# Patient Record
Sex: MALE | Race: BLACK OR AFRICAN AMERICAN | NOT HISPANIC OR LATINO | Employment: OTHER | URBAN - METROPOLITAN AREA
[De-identification: names, ages, dates, MRNs, and addresses within clinical notes are randomized per-mention and may not be internally consistent; named-entity substitution may affect disease eponyms.]

---

## 2023-03-16 LAB
APPEARANCE, URINE: ABNORMAL
BILIRUBIN, URINE: NEGATIVE
BLOOD, URINE: NEGATIVE
BUDDING YEAST, URINE: PRESENT /HPF
COLOR, URINE: ABNORMAL
GLUCOSE, URINE: ABNORMAL MG/DL
HYALINE CASTS, URINE: ABNORMAL /LPF
KETONES, URINE: NEGATIVE MG/DL
LEUKOCYTE ESTERASE, URINE: NEGATIVE
MUCUS, URINE: ABNORMAL /LPF
NITRITE, URINE: NEGATIVE
PH, URINE: 5 (ref 5–8)
PROTEIN, URINE: ABNORMAL MG/DL
RBC, URINE: 3 /HPF (ref 0–5)
SPECIFIC GRAVITY, URINE: 1.03 (ref 1–1.03)
SQUAMOUS EPITHELIAL CELLS, URINE: 2 /HPF
UROBILINOGEN, URINE: <2 MG/DL (ref 0–1.9)
WBC, URINE: 6 /HPF (ref 0–5)

## 2023-03-17 LAB
CREATININE (MG/DL) IN URINE: 241 MG/DL (ref 20–370)
PROTEIN (MG/DL) IN URINE: 557 MG/DL (ref 5–25)
PROTEIN/CREATININE (MG/MG) IN URINE: 2.31 MG/MG CREAT (ref 0–0.17)

## 2023-03-19 LAB — URINE CULTURE: NORMAL

## 2023-03-27 LAB
GRAM STAIN: ABNORMAL
TISSUE/WOUND CULTURE/SMEAR: ABNORMAL

## 2023-03-28 LAB
AFB CULTURE: NORMAL
AFB STAIN: NORMAL

## 2023-05-09 ENCOUNTER — OFFICE VISIT (OUTPATIENT)
Dept: PRIMARY CARE | Facility: CLINIC | Age: 68
End: 2023-05-09
Payer: COMMERCIAL

## 2023-05-09 VITALS
HEART RATE: 73 BPM | DIASTOLIC BLOOD PRESSURE: 67 MMHG | WEIGHT: 160 LBS | HEIGHT: 68 IN | TEMPERATURE: 97.5 F | OXYGEN SATURATION: 98 % | SYSTOLIC BLOOD PRESSURE: 148 MMHG | BODY MASS INDEX: 24.25 KG/M2

## 2023-05-09 DIAGNOSIS — R10.31 RIGHT LOWER QUADRANT ABDOMINAL PAIN: ICD-10-CM

## 2023-05-09 DIAGNOSIS — H53.8 BLURRY VISION, BILATERAL: ICD-10-CM

## 2023-05-09 DIAGNOSIS — Z09 FOLLOW UP: Primary | ICD-10-CM

## 2023-05-09 PROCEDURE — 99214 OFFICE O/P EST MOD 30 MIN: CPT | Performed by: STUDENT IN AN ORGANIZED HEALTH CARE EDUCATION/TRAINING PROGRAM

## 2023-05-09 PROCEDURE — 1159F MED LIST DOCD IN RCRD: CPT | Performed by: STUDENT IN AN ORGANIZED HEALTH CARE EDUCATION/TRAINING PROGRAM

## 2023-05-09 PROCEDURE — 3008F BODY MASS INDEX DOCD: CPT | Performed by: STUDENT IN AN ORGANIZED HEALTH CARE EDUCATION/TRAINING PROGRAM

## 2023-05-09 PROCEDURE — 1036F TOBACCO NON-USER: CPT | Performed by: STUDENT IN AN ORGANIZED HEALTH CARE EDUCATION/TRAINING PROGRAM

## 2023-05-09 RX ORDER — CLINDAMYCIN HYDROCHLORIDE 300 MG/1
CAPSULE ORAL
COMMUNITY
Start: 2023-03-29 | End: 2023-10-03 | Stop reason: ENTERED-IN-ERROR

## 2023-05-09 RX ORDER — SULFAMETHOXAZOLE AND TRIMETHOPRIM 800; 160 MG/1; MG/1
TABLET ORAL
COMMUNITY
Start: 2023-02-17 | End: 2023-10-03 | Stop reason: ENTERED-IN-ERROR

## 2023-05-09 RX ORDER — INSULIN LISPRO 100 [IU]/ML
INJECTION, SOLUTION INTRAVENOUS; SUBCUTANEOUS
COMMUNITY
End: 2023-10-25 | Stop reason: SDUPTHER

## 2023-05-09 RX ORDER — INSULIN GLARGINE 100 [IU]/ML
INJECTION, SOLUTION SUBCUTANEOUS
COMMUNITY
End: 2023-07-05 | Stop reason: SDUPTHER

## 2023-05-09 RX ORDER — FLUCONAZOLE 200 MG/1
TABLET ORAL
COMMUNITY
Start: 2023-03-20 | End: 2023-10-03 | Stop reason: ENTERED-IN-ERROR

## 2023-05-09 RX ORDER — DOCUSATE SODIUM 100 MG/1
CAPSULE, LIQUID FILLED ORAL
COMMUNITY
Start: 2023-02-17 | End: 2023-10-03 | Stop reason: ENTERED-IN-ERROR

## 2023-05-09 RX ORDER — OXYCODONE HYDROCHLORIDE 5 MG/1
TABLET ORAL
COMMUNITY
Start: 2023-03-29 | End: 2023-10-03 | Stop reason: ENTERED-IN-ERROR

## 2023-05-09 ASSESSMENT — PAIN SCALES - GENERAL: PAINLEVEL: 5

## 2023-05-09 NOTE — PROGRESS NOTES
"Subjective   Patient ID: Manolo Bashir is a 67 y.o. male who presents for Follow-up and Abdominal Pain.  #ED Follow up  #Abdominal Pain  - Evaluated in the ED with unremarkable work up  - Feels the same as when evaluated in the ED   - Most concerning symptoms to him are eyes and head  - Eye feel like they are blurry, planning to make an appointment  - Told to see GI but has not yet    #Abdominal pain  - Localized to the mid-right of the stomach  - States its near where his ostomy bag was  - Endorses diarrhea for the last two week, no blood noticed  - States his most recent colonoscopy was unremarkable  - Denies any nausea, vomiting, hematochezia, or urinary symptoms      Objective     /67 (BP Location: Right arm, Patient Position: Sitting)   Pulse 73   Temp 36.4 °C (97.5 °F)   Ht 1.727 m (5' 8\")   Wt 72.6 kg (160 lb)   SpO2 98%   BMI 24.33 kg/m²     Physical Exam  Vitals reviewed.   Eyes:      General: Lids are normal.         Right eye: No discharge.         Left eye: No discharge.      Extraocular Movements: Extraocular movements intact.   Cardiovascular:      Rate and Rhythm: Normal rate and regular rhythm.      Heart sounds: Normal heart sounds.   Pulmonary:      Effort: Pulmonary effort is normal. No respiratory distress.      Breath sounds: Normal breath sounds.   Abdominal:      General: Abdomen is flat. A surgical scar is present. Bowel sounds are normal. There is no distension.      Palpations: Abdomen is soft.      Tenderness: There is no abdominal tenderness. There is no guarding.   Musculoskeletal:         General: Normal range of motion.   Skin:     General: Skin is warm and dry.      Findings: Rash present. Rash is papular.      Comments: Multiple nodules on the face   Neurological:      General: No focal deficit present.      Mental Status: He is alert and oriented to person, place, and time.       Assessment/Plan   Manolo Bashir is a 67 y.o. male who presents for concerns " below    Diagnoses and all orders for this visit:  Follow up  Right lower quadrant abdominal pain  -     Referral to Gastroenterology; Future  Blurry vision, bilateral  -     Referral to Ophthalmology; Future    #Abdominal Pain  - ED workup unremarkable  - Imaging reviewed, no acute concern for malignancy  - Referral to GI for further work up    #Blurry vision  - Encouraged follow up with Ophthalmology    #Skin concern  - Patient with Dermatology appointment already  - Encouraged follow up and reach out for earlier appointment     Patient seen and discussed with attending: Dr.Chao Jorge Borrero MD  Family Medicine  PGY3

## 2023-05-10 NOTE — PROGRESS NOTES
I saw and evaluated the patient. I personally obtained the key and critical portions of the history and physical exam or was physically present for key and critical portions performed by the resident/fellow. I reviewed the resident/fellow's documentation and discussed the patient with the resident/fellow. I agree with the resident/fellow's medical decision making as documented in the note.    Calvin Zabala MD

## 2023-06-12 ENCOUNTER — APPOINTMENT (OUTPATIENT)
Dept: LAB | Facility: LAB | Age: 68
End: 2023-06-12
Payer: COMMERCIAL

## 2023-06-12 LAB
ALBUMIN (G/DL) IN SER/PLAS: 3.4 G/DL (ref 3.4–5)
ANION GAP IN SER/PLAS: 8 MMOL/L (ref 10–20)
BASOPHILS (10*3/UL) IN BLOOD BY AUTOMATED COUNT: 0.02 X10E9/L (ref 0–0.1)
BASOPHILS/100 LEUKOCYTES IN BLOOD BY AUTOMATED COUNT: 0.6 % (ref 0–2)
CALCIDIOL (25 OH VITAMIN D3) (NG/ML) IN SER/PLAS: 38 NG/ML
CALCIUM (MG/DL) IN SER/PLAS: 10.3 MG/DL (ref 8.6–10.6)
CARBON DIOXIDE, TOTAL (MMOL/L) IN SER/PLAS: 30 MMOL/L (ref 21–32)
CHLORIDE (MMOL/L) IN SER/PLAS: 110 MMOL/L (ref 98–107)
CREATININE (MG/DL) IN SER/PLAS: 2.64 MG/DL (ref 0.5–1.3)
CREATININE (MG/DL) IN URINE: 96.8 MG/DL (ref 20–370)
EOSINOPHILS (10*3/UL) IN BLOOD BY AUTOMATED COUNT: 0.16 X10E9/L (ref 0–0.7)
EOSINOPHILS/100 LEUKOCYTES IN BLOOD BY AUTOMATED COUNT: 4.4 % (ref 0–6)
ERYTHROCYTE DISTRIBUTION WIDTH (RATIO) BY AUTOMATED COUNT: 14 % (ref 11.5–14.5)
ERYTHROCYTE MEAN CORPUSCULAR HEMOGLOBIN CONCENTRATION (G/DL) BY AUTOMATED: 32.3 G/DL (ref 32–36)
ERYTHROCYTE MEAN CORPUSCULAR VOLUME (FL) BY AUTOMATED COUNT: 86 FL (ref 80–100)
ERYTHROCYTES (10*6/UL) IN BLOOD BY AUTOMATED COUNT: 3.68 X10E12/L (ref 4.5–5.9)
GFR MALE: 26 ML/MIN/1.73M2
GLUCOSE (MG/DL) IN SER/PLAS: 201 MG/DL (ref 74–99)
HEMATOCRIT (%) IN BLOOD BY AUTOMATED COUNT: 31.6 % (ref 41–52)
HEMOGLOBIN (G/DL) IN BLOOD: 10.2 G/DL (ref 13.5–17.5)
IMMATURE GRANULOCYTES/100 LEUKOCYTES IN BLOOD BY AUTOMATED COUNT: 0.6 % (ref 0–0.9)
INR IN PPP BY COAGULATION ASSAY: 1 (ref 0.9–1.1)
LEUKOCYTES (10*3/UL) IN BLOOD BY AUTOMATED COUNT: 3.6 X10E9/L (ref 4.4–11.3)
LYMPHOCYTES (10*3/UL) IN BLOOD BY AUTOMATED COUNT: 1.06 X10E9/L (ref 1.2–4.8)
LYMPHOCYTES/100 LEUKOCYTES IN BLOOD BY AUTOMATED COUNT: 29.2 % (ref 13–44)
MAGNESIUM (MG/DL) IN SER/PLAS: 1.76 MG/DL (ref 1.6–2.4)
MONOCYTES (10*3/UL) IN BLOOD BY AUTOMATED COUNT: 0.37 X10E9/L (ref 0.1–1)
MONOCYTES/100 LEUKOCYTES IN BLOOD BY AUTOMATED COUNT: 10.2 % (ref 2–10)
NEUTROPHILS (10*3/UL) IN BLOOD BY AUTOMATED COUNT: 2 X10E9/L (ref 1.2–7.7)
NEUTROPHILS/100 LEUKOCYTES IN BLOOD BY AUTOMATED COUNT: 55 % (ref 40–80)
NRBC (PER 100 WBCS) BY AUTOMATED COUNT: 0 /100 WBC (ref 0–0)
PARATHYRIN INTACT (PG/ML) IN SER/PLAS: 225.2 PG/ML (ref 18.5–88)
PHOSPHATE (MG/DL) IN SER/PLAS: 2.7 MG/DL (ref 2.5–4.9)
PLATELETS (10*3/UL) IN BLOOD AUTOMATED COUNT: 151 X10E9/L (ref 150–450)
POTASSIUM (MMOL/L) IN SER/PLAS: 4.3 MMOL/L (ref 3.5–5.3)
PROTEIN (MG/DL) IN URINE: 195 MG/DL (ref 5–25)
PROTEIN/CREATININE (MG/MG) IN URINE: 2.01 MG/MG CREAT (ref 0–0.17)
PROTHROMBIN TIME (PT) IN PPP BY COAGULATION ASSAY: 11.6 SEC (ref 9.8–13.4)
SODIUM (MMOL/L) IN SER/PLAS: 144 MMOL/L (ref 136–145)
TACROLIMUS (NG/ML) IN BLOOD: 10.1 NG/ML (ref 2–15)
UREA NITROGEN (MG/DL) IN SER/PLAS: 41 MG/DL (ref 6–23)

## 2023-06-13 LAB
CYTOMEGALOVIRUS DNA, PCR COMMENT: NORMAL
CYTOMEGALOVIRUS DNA, PCR IU/ML: NOT DETECTED IU/ML
CYTOMEGALOVIRUS DNA, PCR LOG IU/ML: NORMAL LOG IU/ML

## 2023-06-14 ENCOUNTER — APPOINTMENT (OUTPATIENT)
Dept: LAB | Facility: LAB | Age: 68
End: 2023-06-14
Payer: COMMERCIAL

## 2023-06-14 LAB
APPEARANCE, URINE: CLEAR
BACTERIA, URINE: ABNORMAL /HPF
BILIRUBIN, URINE: NEGATIVE
BLOOD, URINE: NEGATIVE
COLOR, URINE: ABNORMAL
GLUCOSE, URINE: ABNORMAL MG/DL
KETONES, URINE: NEGATIVE MG/DL
LEUKOCYTE ESTERASE, URINE: NEGATIVE
NITRITE, URINE: NEGATIVE
PH, URINE: 7 (ref 5–8)
PROTEIN, URINE: ABNORMAL MG/DL
RBC, URINE: <1 /HPF (ref 0–5)
SPECIFIC GRAVITY, URINE: 1.01 (ref 1–1.03)
SQUAMOUS EPITHELIAL CELLS, URINE: 2 /HPF
TACROLIMUS (NG/ML) IN BLOOD: 2.9 NG/ML (ref 2–15)
UROBILINOGEN, URINE: <2 MG/DL (ref 0–1.9)
WBC, URINE: <1 /HPF (ref 0–5)

## 2023-06-15 LAB — URINE CULTURE: ABNORMAL

## 2023-06-16 ENCOUNTER — APPOINTMENT (OUTPATIENT)
Dept: LAB | Facility: LAB | Age: 68
End: 2023-06-16
Payer: COMMERCIAL

## 2023-06-16 LAB — TACROLIMUS (NG/ML) IN BLOOD: 6.5 NG/ML (ref 2–15)

## 2023-07-05 DIAGNOSIS — E11.65 TYPE 2 DIABETES MELLITUS WITH HYPERGLYCEMIA, WITH LONG-TERM CURRENT USE OF INSULIN (MULTI): Primary | ICD-10-CM

## 2023-07-05 DIAGNOSIS — Z79.4 TYPE 2 DIABETES MELLITUS WITH HYPERGLYCEMIA, WITH LONG-TERM CURRENT USE OF INSULIN (MULTI): Primary | ICD-10-CM

## 2023-07-05 RX ORDER — INSULIN GLARGINE 100 [IU]/ML
INJECTION, SOLUTION SUBCUTANEOUS
Qty: 3 ML | Refills: 3 | Status: SHIPPED | OUTPATIENT
Start: 2023-07-05 | End: 2023-07-11 | Stop reason: SDUPTHER

## 2023-07-11 DIAGNOSIS — E11.65 TYPE 2 DIABETES MELLITUS WITH HYPERGLYCEMIA, WITH LONG-TERM CURRENT USE OF INSULIN (MULTI): ICD-10-CM

## 2023-07-11 DIAGNOSIS — Z79.4 TYPE 2 DIABETES MELLITUS WITH HYPERGLYCEMIA, WITH LONG-TERM CURRENT USE OF INSULIN (MULTI): ICD-10-CM

## 2023-07-11 RX ORDER — INSULIN GLARGINE 100 [IU]/ML
INJECTION, SOLUTION SUBCUTANEOUS
Qty: 3 ML | Refills: 10 | Status: SHIPPED | OUTPATIENT
Start: 2023-07-11 | End: 2023-10-13 | Stop reason: HOSPADM

## 2023-07-13 DIAGNOSIS — Z79.4 TYPE 2 DIABETES MELLITUS WITH HYPERGLYCEMIA, WITH LONG-TERM CURRENT USE OF INSULIN (MULTI): ICD-10-CM

## 2023-07-13 DIAGNOSIS — E11.65 TYPE 2 DIABETES MELLITUS WITH HYPERGLYCEMIA, WITH LONG-TERM CURRENT USE OF INSULIN (MULTI): ICD-10-CM

## 2023-07-13 RX ORDER — INSULIN GLARGINE 100 [IU]/ML
INJECTION, SOLUTION SUBCUTANEOUS
Qty: 15 ML | Refills: 10 | OUTPATIENT
Start: 2023-07-13

## 2023-07-19 LAB
ALANINE AMINOTRANSFERASE (SGPT) (U/L) IN SER/PLAS: 12 U/L (ref 10–52)
ALBUMIN (G/DL) IN SER/PLAS: 3.3 G/DL (ref 3.4–5)
ALKALINE PHOSPHATASE (U/L) IN SER/PLAS: 80 U/L (ref 33–136)
ANION GAP IN SER/PLAS: 9 MMOL/L (ref 10–20)
APPEARANCE, URINE: NORMAL
ASCORBIC ACID: NORMAL MG/DL
ASPARTATE AMINOTRANSFERASE (SGOT) (U/L) IN SER/PLAS: 15 U/L (ref 9–39)
BILIRUBIN TOTAL (MG/DL) IN SER/PLAS: 0.3 MG/DL (ref 0–1.2)
BILIRUBIN, URINE: NORMAL
BLOOD, URINE: NORMAL
C PEPTIDE (NG/ML) IN SER/PLAS: 2.4 NG/ML (ref 0.7–3.9)
CALCIUM (MG/DL) IN SER/PLAS: 9.7 MG/DL (ref 8.6–10.6)
CARBON DIOXIDE, TOTAL (MMOL/L) IN SER/PLAS: 28 MMOL/L (ref 21–32)
CHLORIDE (MMOL/L) IN SER/PLAS: 109 MMOL/L (ref 98–107)
COLOR, URINE: NORMAL
CREATININE (MG/DL) IN SER/PLAS: 2.8 MG/DL (ref 0.5–1.3)
ESTIMATED AVERAGE GLUCOSE FOR HBA1C: 194 MG/DL
GFR MALE: 24 ML/MIN/1.73M2
GLUCOSE (MG/DL) IN SER/PLAS: 257 MG/DL (ref 74–99)
GLUCOSE, URINE: NORMAL
HEMOGLOBIN A1C/HEMOGLOBIN TOTAL IN BLOOD: 8.4 %
KETONES, URINE: NORMAL
LEUKOCYTE ESTERASE, URINE: NORMAL
NITRITE, URINE: NORMAL
PH, URINE: NORMAL
POTASSIUM (MMOL/L) IN SER/PLAS: 4.6 MMOL/L (ref 3.5–5.3)
PROTEIN TOTAL: 6 G/DL (ref 6.4–8.2)
PROTEIN, URINE: NORMAL
SODIUM (MMOL/L) IN SER/PLAS: 141 MMOL/L (ref 136–145)
SPECIFIC GRAVITY, URINE: NORMAL
THYROTROPIN (MIU/L) IN SER/PLAS BY DETECTION LIMIT <= 0.05 MIU/L: 1.94 MIU/L (ref 0.44–3.98)
UREA NITROGEN (MG/DL) IN SER/PLAS: 41 MG/DL (ref 6–23)
URINE CULTURE: NORMAL
UROBILINOGEN, URINE: NORMAL

## 2023-08-17 PROBLEM — E21.2 TERTIARY HYPERPARATHYROIDISM (MULTI): Status: ACTIVE | Noted: 2023-08-17

## 2023-08-17 PROBLEM — I73.9 PERIPHERAL VASCULAR DISEASE (CMS-HCC): Status: ACTIVE | Noted: 2023-08-17

## 2023-08-17 PROBLEM — M25.571 CHRONIC PAIN OF BOTH ANKLES: Status: ACTIVE | Noted: 2023-08-17

## 2023-08-17 PROBLEM — Z94.0 KIDNEY REPLACED BY TRANSPLANT (HHS-HCC): Status: ACTIVE | Noted: 2023-08-17

## 2023-08-17 PROBLEM — R26.2 DIFFICULTY WALKING: Status: ACTIVE | Noted: 2023-08-17

## 2023-08-17 PROBLEM — H57.13 PAIN OF BOTH EYES: Status: ACTIVE | Noted: 2023-08-17

## 2023-08-17 PROBLEM — T83.89XA: Status: ACTIVE | Noted: 2023-08-17

## 2023-08-17 PROBLEM — E01.2 COLLOID GOITER: Status: ACTIVE | Noted: 2023-08-17

## 2023-08-17 PROBLEM — H10.13 ALLERGIC CONJUNCTIVITIS OF BOTH EYES: Status: ACTIVE | Noted: 2023-08-17

## 2023-08-17 PROBLEM — S99.911A RIGHT ANKLE INJURY: Status: ACTIVE | Noted: 2023-08-17

## 2023-08-17 PROBLEM — R19.7 DIARRHEA: Status: ACTIVE | Noted: 2023-08-17

## 2023-08-17 PROBLEM — H25.812 COMBINED FORM OF AGE-RELATED CATARACT, LEFT EYE: Status: ACTIVE | Noted: 2023-08-17

## 2023-08-17 PROBLEM — M19.171 POST-TRAUMATIC OSTEOARTHRITIS OF BOTH ANKLES: Status: ACTIVE | Noted: 2023-08-17

## 2023-08-17 PROBLEM — M25.512 SHOULDER PAIN, LEFT: Status: ACTIVE | Noted: 2023-08-17

## 2023-08-17 PROBLEM — E04.9 GOITER: Status: ACTIVE | Noted: 2023-08-17

## 2023-08-17 PROBLEM — M19.90 ARTHRITIS: Status: ACTIVE | Noted: 2023-08-17

## 2023-08-17 PROBLEM — E13.9 SECONDARY DIABETES MELLITUS (MULTI): Status: ACTIVE | Noted: 2023-08-17

## 2023-08-17 PROBLEM — M19.071 PRIMARY OSTEOARTHRITIS OF BOTH ANKLES: Status: ACTIVE | Noted: 2023-08-17

## 2023-08-17 PROBLEM — N18.30 STAGE 3 CHRONIC KIDNEY DISEASE (MULTI): Status: ACTIVE | Noted: 2023-08-17

## 2023-08-17 PROBLEM — R36.9 PENILE DISCHARGE: Status: ACTIVE | Noted: 2023-08-17

## 2023-08-17 PROBLEM — K59.00 CONSTIPATION: Status: ACTIVE | Noted: 2023-08-17

## 2023-08-17 PROBLEM — N39.0 UTI (URINARY TRACT INFECTION): Status: ACTIVE | Noted: 2023-08-17

## 2023-08-17 PROBLEM — M62.469 GASTROCNEMIUS EQUINUS: Status: ACTIVE | Noted: 2023-08-17

## 2023-08-17 PROBLEM — H52.7 REFRACTION ERROR: Status: ACTIVE | Noted: 2023-08-17

## 2023-08-17 PROBLEM — R53.83 FATIGUE: Status: ACTIVE | Noted: 2023-08-17

## 2023-08-17 PROBLEM — B85.2 LICE: Status: ACTIVE | Noted: 2023-08-17

## 2023-08-17 PROBLEM — Z79.899 HIGH RISK MEDICATION USE: Status: ACTIVE | Noted: 2023-08-17

## 2023-08-17 PROBLEM — M79.89 LEG SWELLING: Status: ACTIVE | Noted: 2023-08-17

## 2023-08-17 PROBLEM — R45.89 DEPRESSED MOOD: Status: ACTIVE | Noted: 2023-08-17

## 2023-08-17 PROBLEM — G47.00 INSOMNIA: Status: ACTIVE | Noted: 2023-08-17

## 2023-08-17 PROBLEM — A64 STD (SEXUALLY TRANSMITTED DISEASE): Status: ACTIVE | Noted: 2023-08-17

## 2023-08-17 PROBLEM — R51.9 HEADACHE: Status: ACTIVE | Noted: 2023-08-17

## 2023-08-17 PROBLEM — K63.5 POLYP, COLONIC: Status: ACTIVE | Noted: 2023-08-17

## 2023-08-17 PROBLEM — E11.9 DIABETES MELLITUS TYPE 2 WITHOUT RETINOPATHY (MULTI): Status: ACTIVE | Noted: 2023-08-17

## 2023-08-17 PROBLEM — H25.811 COMBINED FORM OF AGE-RELATED CATARACT, RIGHT EYE: Status: ACTIVE | Noted: 2023-08-17

## 2023-08-17 PROBLEM — E55.9 VITAMIN D DEFICIENCY: Status: ACTIVE | Noted: 2023-08-17

## 2023-08-17 PROBLEM — J30.9 ALLERGIC RHINITIS: Status: ACTIVE | Noted: 2023-08-17

## 2023-08-17 PROBLEM — L29.9 ITCHING: Status: ACTIVE | Noted: 2023-08-17

## 2023-08-17 PROBLEM — Q27.9: Status: ACTIVE | Noted: 2023-08-17

## 2023-08-17 PROBLEM — Z86.19 HISTORY OF HEPATITIS C: Status: ACTIVE | Noted: 2023-08-17

## 2023-08-17 PROBLEM — M19.072 PRIMARY OSTEOARTHRITIS OF BOTH ANKLES: Status: ACTIVE | Noted: 2023-08-17

## 2023-08-17 PROBLEM — I71.9 AORTIC ANEURYSM (CMS-HCC): Status: ACTIVE | Noted: 2023-08-17

## 2023-08-17 PROBLEM — B18.2 CHRONIC HEPATITIS C (MULTI): Status: ACTIVE | Noted: 2023-08-17

## 2023-08-17 PROBLEM — H01.003 BLEPHARITIS OF BOTH EYES: Status: ACTIVE | Noted: 2023-08-17

## 2023-08-17 PROBLEM — N52.9 MALE ERECTILE DISORDER OF ORGANIC ORIGIN: Status: ACTIVE | Noted: 2023-08-17

## 2023-08-17 PROBLEM — D12.6 TUBULAR ADENOMA OF COLON: Status: ACTIVE | Noted: 2023-08-17

## 2023-08-17 PROBLEM — I10 HYPERTENSION: Status: ACTIVE | Noted: 2023-08-17

## 2023-08-17 PROBLEM — G89.29 CHRONIC PAIN OF BOTH ANKLES: Status: ACTIVE | Noted: 2023-08-17

## 2023-08-17 PROBLEM — D64.9 ANEMIA: Status: ACTIVE | Noted: 2023-08-17

## 2023-08-17 PROBLEM — M79.641 HAND PAIN, RIGHT: Status: ACTIVE | Noted: 2023-08-17

## 2023-08-17 PROBLEM — K21.9 GERD (GASTROESOPHAGEAL REFLUX DISEASE): Status: ACTIVE | Noted: 2023-08-17

## 2023-08-17 PROBLEM — R94.31 ABNORMAL EKG: Status: ACTIVE | Noted: 2023-08-17

## 2023-08-17 PROBLEM — H53.8 BLURRY VISION, BILATERAL: Status: ACTIVE | Noted: 2023-08-17

## 2023-08-17 PROBLEM — R11.2 NAUSEA AND/OR VOMITING: Status: ACTIVE | Noted: 2023-08-17

## 2023-08-17 PROBLEM — H01.006 BLEPHARITIS OF BOTH EYES: Status: ACTIVE | Noted: 2023-08-17

## 2023-08-17 PROBLEM — C61 ADENOCARCINOMA OF PROSTATE (MULTI): Status: ACTIVE | Noted: 2023-08-17

## 2023-08-17 PROBLEM — E66.3 OVERWEIGHT: Status: ACTIVE | Noted: 2023-08-17

## 2023-08-17 PROBLEM — M19.072 OSTEOARTHRITIS OF ANKLE, LEFT: Status: ACTIVE | Noted: 2023-08-17

## 2023-08-17 PROBLEM — B35.3 TINEA PEDIS OF BOTH FEET: Status: ACTIVE | Noted: 2023-08-17

## 2023-08-17 PROBLEM — D84.9 IMMUNOSUPPRESSION (MULTI): Status: ACTIVE | Noted: 2023-08-17

## 2023-08-17 PROBLEM — S69.90XA THUMB INJURY: Status: ACTIVE | Noted: 2023-08-17

## 2023-08-17 PROBLEM — E21.3 HYPERPARATHYROIDISM (MULTI): Status: ACTIVE | Noted: 2023-08-17

## 2023-08-17 PROBLEM — M25.572 CHRONIC PAIN OF BOTH ANKLES: Status: ACTIVE | Noted: 2023-08-17

## 2023-08-17 PROBLEM — M19.071 OSTEOARTHRITIS OF ANKLE, RIGHT: Status: ACTIVE | Noted: 2023-08-17

## 2023-08-17 PROBLEM — B35.1 ONYCHOMYCOSIS: Status: ACTIVE | Noted: 2023-08-17

## 2023-08-17 PROBLEM — M19.172 POST-TRAUMATIC OSTEOARTHRITIS OF BOTH ANKLES: Status: ACTIVE | Noted: 2023-08-17

## 2023-08-17 PROBLEM — R32 URINARY INCONTINENCE: Status: ACTIVE | Noted: 2023-08-17

## 2023-08-17 PROBLEM — I20.9 ANGINA PECTORIS (CMS-HCC): Status: ACTIVE | Noted: 2023-08-17

## 2023-08-17 PROBLEM — E83.52 HYPERCALCEMIA: Status: ACTIVE | Noted: 2023-08-17

## 2023-08-17 PROBLEM — E78.5 DYSLIPIDEMIA: Status: ACTIVE | Noted: 2023-08-17

## 2023-08-17 RX ORDER — CEFDINIR 300 MG/1
CAPSULE ORAL
COMMUNITY
Start: 2022-09-12 | End: 2023-10-03 | Stop reason: ENTERED-IN-ERROR

## 2023-08-17 RX ORDER — FLASH GLUCOSE SCANNING READER
EACH MISCELLANEOUS
COMMUNITY
End: 2023-10-03 | Stop reason: ENTERED-IN-ERROR

## 2023-08-17 RX ORDER — PREDNISONE 5 MG/1
5 TABLET ORAL EVERY MORNING
Status: ON HOLD | COMMUNITY
Start: 2023-05-26 | End: 2023-10-20 | Stop reason: SDUPTHER

## 2023-08-17 RX ORDER — PANTOPRAZOLE SODIUM 40 MG/1
40 TABLET, DELAYED RELEASE ORAL
COMMUNITY
End: 2023-10-19 | Stop reason: ALTCHOICE

## 2023-08-17 RX ORDER — SYRINGE-NEEDLE,INSULIN,0.5 ML 31 GX5/16"
SYRINGE, EMPTY DISPOSABLE MISCELLANEOUS
COMMUNITY
Start: 2023-06-27 | End: 2023-10-03 | Stop reason: ENTERED-IN-ERROR

## 2023-08-17 RX ORDER — BLOOD-GLUCOSE,RECEIVER,CONT
EACH MISCELLANEOUS
COMMUNITY
Start: 2023-07-19 | End: 2023-10-03 | Stop reason: ENTERED-IN-ERROR

## 2023-08-17 RX ORDER — HYDRALAZINE HYDROCHLORIDE 25 MG/1
25 TABLET, FILM COATED ORAL 3 TIMES DAILY
COMMUNITY
Start: 2023-06-27 | End: 2023-11-22 | Stop reason: HOSPADM

## 2023-08-17 RX ORDER — DULAGLUTIDE 0.75 MG/.5ML
0.75 INJECTION, SOLUTION SUBCUTANEOUS
COMMUNITY
Start: 2023-07-19 | End: 2023-10-03 | Stop reason: ENTERED-IN-ERROR

## 2023-08-17 RX ORDER — PEN NEEDLE, DIABETIC 32GX 5/32"
NEEDLE, DISPOSABLE MISCELLANEOUS
COMMUNITY
Start: 2023-06-27 | End: 2023-10-03 | Stop reason: ENTERED-IN-ERROR

## 2023-08-17 RX ORDER — LOSARTAN POTASSIUM 25 MG/1
25 TABLET ORAL DAILY
COMMUNITY
Start: 2023-07-05 | End: 2024-01-06 | Stop reason: HOSPADM

## 2023-08-17 RX ORDER — AZATHIOPRINE 50 MG/1
1 TABLET ORAL DAILY
Status: ON HOLD | COMMUNITY
Start: 2023-06-27 | End: 2023-10-20 | Stop reason: SDUPTHER

## 2023-08-17 RX ORDER — AMPICILLIN TRIHYDRATE 500 MG
50 CAPSULE ORAL DAILY
Status: ON HOLD | COMMUNITY
Start: 2023-06-21 | End: 2023-11-22 | Stop reason: SDUPTHER

## 2023-08-17 RX ORDER — FERROUS SULFATE TAB 325 MG (65 MG ELEMENTAL FE) 325 (65 FE) MG
65 TAB ORAL 2 TIMES DAILY
Status: ON HOLD | COMMUNITY
Start: 2023-06-27 | End: 2023-11-22 | Stop reason: SDUPTHER

## 2023-08-17 RX ORDER — LANCETS
EACH MISCELLANEOUS
COMMUNITY
Start: 2023-06-27 | End: 2023-10-03 | Stop reason: ENTERED-IN-ERROR

## 2023-08-17 RX ORDER — CALCIUM CITRATE/VITAMIN D3 200MG-6.25
TABLET ORAL
COMMUNITY
Start: 2023-06-27 | End: 2023-10-03 | Stop reason: ENTERED-IN-ERROR

## 2023-08-17 RX ORDER — CARVEDILOL 25 MG/1
25 TABLET ORAL 2 TIMES DAILY
COMMUNITY
Start: 2023-06-27 | End: 2023-11-22 | Stop reason: HOSPADM

## 2023-08-17 RX ORDER — ISOPROPYL ALCOHOL 70 ML/100ML
SWAB TOPICAL
COMMUNITY
Start: 2023-06-27 | End: 2023-10-03 | Stop reason: ENTERED-IN-ERROR

## 2023-08-17 RX ORDER — AMLODIPINE BESYLATE 10 MG/1
10 TABLET ORAL DAILY
COMMUNITY
Start: 2023-06-27 | End: 2024-01-21 | Stop reason: HOSPADM

## 2023-08-17 RX ORDER — TACROLIMUS 1 MG/1
CAPSULE ORAL
Status: ON HOLD | COMMUNITY
Start: 2023-06-27 | End: 2023-10-20 | Stop reason: SDUPTHER

## 2023-08-17 RX ORDER — BLOOD-GLUCOSE SENSOR
EACH MISCELLANEOUS
COMMUNITY
Start: 2023-07-19 | End: 2023-10-03 | Stop reason: ENTERED-IN-ERROR

## 2023-08-17 RX ORDER — PRAVASTATIN SODIUM 40 MG/1
40 TABLET ORAL NIGHTLY
COMMUNITY
Start: 2023-06-27 | End: 2023-11-13 | Stop reason: SDUPTHER

## 2023-08-17 RX ORDER — FLASH GLUCOSE SENSOR
KIT MISCELLANEOUS
COMMUNITY
End: 2023-10-03 | Stop reason: ENTERED-IN-ERROR

## 2023-08-17 RX ORDER — IBUPROFEN 600 MG/1
600 TABLET ORAL 2 TIMES DAILY PRN
COMMUNITY
End: 2023-10-03 | Stop reason: ENTERED-IN-ERROR

## 2023-08-21 PROBLEM — M89.8X8 MASS OF SPINE: Status: ACTIVE | Noted: 2023-08-21

## 2023-08-21 PROBLEM — R91.1 LUNG NODULE: Status: ACTIVE | Noted: 2023-08-21

## 2023-08-21 PROBLEM — S62.669A: Status: ACTIVE | Noted: 2023-08-21

## 2023-08-21 PROBLEM — T81.49XA SURGICAL SITE INFECTION: Status: ACTIVE | Noted: 2023-08-21

## 2023-08-21 PROBLEM — R80.9 PROTEINURIA: Status: ACTIVE | Noted: 2023-08-21

## 2023-08-21 RX ORDER — CINACALCET 30 MG/1
30 TABLET, FILM COATED ORAL DAILY
COMMUNITY
End: 2023-10-03 | Stop reason: ENTERED-IN-ERROR

## 2023-08-28 ENCOUNTER — HOSPITAL ENCOUNTER (OUTPATIENT)
Dept: DATA CONVERSION | Facility: HOSPITAL | Age: 68
End: 2023-08-28
Attending: INTERNAL MEDICINE | Admitting: INTERNAL MEDICINE
Payer: COMMERCIAL

## 2023-08-28 DIAGNOSIS — Z98.0 INTESTINAL BYPASS AND ANASTOMOSIS STATUS: ICD-10-CM

## 2023-08-28 DIAGNOSIS — Z86.010 PERSONAL HISTORY OF COLONIC POLYPS: ICD-10-CM

## 2023-08-28 DIAGNOSIS — Z12.11 ENCOUNTER FOR SCREENING FOR MALIGNANT NEOPLASM OF COLON: ICD-10-CM

## 2023-08-28 DIAGNOSIS — K64.4 RESIDUAL HEMORRHOIDAL SKIN TAGS: ICD-10-CM

## 2023-08-28 DIAGNOSIS — K57.30 DIVERTICULOSIS OF LARGE INTESTINE WITHOUT PERFORATION OR ABSCESS WITHOUT BLEEDING: ICD-10-CM

## 2023-08-28 LAB — POCT GLUCOSE: 79 MG/DL (ref 74–99)

## 2023-08-30 ENCOUNTER — LAB (OUTPATIENT)
Dept: LAB | Facility: LAB | Age: 68
End: 2023-08-30
Payer: COMMERCIAL

## 2023-08-30 LAB
ALBUMIN (G/DL) IN SER/PLAS: 3.5 G/DL (ref 3.4–5)
ANION GAP IN SER/PLAS: 11 MMOL/L (ref 10–20)
CALCIUM (MG/DL) IN SER/PLAS: 10.1 MG/DL (ref 8.6–10.6)
CARBON DIOXIDE, TOTAL (MMOL/L) IN SER/PLAS: 27 MMOL/L (ref 21–32)
CHLORIDE (MMOL/L) IN SER/PLAS: 106 MMOL/L (ref 98–107)
CREATININE (MG/DL) IN SER/PLAS: 3.41 MG/DL (ref 0.5–1.3)
ERYTHROCYTE DISTRIBUTION WIDTH (RATIO) BY AUTOMATED COUNT: 13.2 % (ref 11.5–14.5)
ERYTHROCYTE MEAN CORPUSCULAR HEMOGLOBIN CONCENTRATION (G/DL) BY AUTOMATED: 32.1 G/DL (ref 32–36)
ERYTHROCYTE MEAN CORPUSCULAR VOLUME (FL) BY AUTOMATED COUNT: 86 FL (ref 80–100)
ERYTHROCYTES (10*6/UL) IN BLOOD BY AUTOMATED COUNT: 3.26 X10E12/L (ref 4.5–5.9)
GFR MALE: 19 ML/MIN/1.73M2
GLUCOSE (MG/DL) IN SER/PLAS: 175 MG/DL (ref 74–99)
HEMATOCRIT (%) IN BLOOD BY AUTOMATED COUNT: 28 % (ref 41–52)
HEMOGLOBIN (G/DL) IN BLOOD: 9 G/DL (ref 13.5–17.5)
LEUKOCYTES (10*3/UL) IN BLOOD BY AUTOMATED COUNT: 4 X10E9/L (ref 4.4–11.3)
NRBC (PER 100 WBCS) BY AUTOMATED COUNT: 0 /100 WBC (ref 0–0)
PHOSPHATE (MG/DL) IN SER/PLAS: 3.3 MG/DL (ref 2.5–4.9)
PLATELETS (10*3/UL) IN BLOOD AUTOMATED COUNT: 201 X10E9/L (ref 150–450)
POTASSIUM (MMOL/L) IN SER/PLAS: 4.4 MMOL/L (ref 3.5–5.3)
SODIUM (MMOL/L) IN SER/PLAS: 140 MMOL/L (ref 136–145)
TACROLIMUS (NG/ML) IN BLOOD: 6.9 NG/ML (ref 2–15)
UREA NITROGEN (MG/DL) IN SER/PLAS: 49 MG/DL (ref 6–23)

## 2023-09-28 ENCOUNTER — PATIENT OUTREACH (OUTPATIENT)
Dept: CARE COORDINATION | Facility: CLINIC | Age: 68
End: 2023-09-28
Payer: COMMERCIAL

## 2023-10-02 ENCOUNTER — HOSPITAL ENCOUNTER (INPATIENT)
Facility: HOSPITAL | Age: 68
LOS: 8 days | Discharge: HOME | End: 2023-10-13
Attending: GENERAL PRACTICE | Admitting: FAMILY MEDICINE
Payer: COMMERCIAL

## 2023-10-02 DIAGNOSIS — R10.30 LOWER ABDOMINAL PAIN, UNSPECIFIED: ICD-10-CM

## 2023-10-02 DIAGNOSIS — R10.84 GENERALIZED ABDOMINAL PAIN: Primary | ICD-10-CM

## 2023-10-02 DIAGNOSIS — E11.9 DIABETES MELLITUS TYPE 2 WITHOUT RETINOPATHY (MULTI): ICD-10-CM

## 2023-10-02 LAB
ALBUMIN SERPL BCP-MCNC: 3.3 G/DL (ref 3.4–5)
ALP SERPL-CCNC: 48 U/L (ref 33–136)
ALT SERPL W P-5'-P-CCNC: 9 U/L (ref 10–52)
ANION GAP SERPL CALC-SCNC: 10 MMOL/L (ref 10–20)
AST SERPL W P-5'-P-CCNC: 17 U/L (ref 9–39)
BASOPHILS # BLD AUTO: 0.02 X10*3/UL (ref 0–0.1)
BASOPHILS NFR BLD AUTO: 0.7 %
BILIRUB SERPL-MCNC: 0.7 MG/DL (ref 0–1.2)
BUN SERPL-MCNC: 21 MG/DL (ref 6–23)
CALCIUM SERPL-MCNC: 9.6 MG/DL (ref 8.6–10.3)
CHLORIDE SERPL-SCNC: 102 MMOL/L (ref 98–107)
CO2 SERPL-SCNC: 25 MMOL/L (ref 21–32)
CREAT SERPL-MCNC: 2.49 MG/DL (ref 0.5–1.3)
EOSINOPHIL # BLD AUTO: 0.2 X10*3/UL (ref 0–0.7)
EOSINOPHIL NFR BLD AUTO: 7.1 %
ERYTHROCYTE [DISTWIDTH] IN BLOOD BY AUTOMATED COUNT: 16.5 % (ref 11.5–14.5)
GFR SERPL CREATININE-BSD FRML MDRD: 28 ML/MIN/1.73M*2
GLUCOSE SERPL-MCNC: 127 MG/DL (ref 74–99)
HCT VFR BLD AUTO: 25 % (ref 41–52)
HGB BLD-MCNC: 8.4 G/DL (ref 13.5–17.5)
IMM GRANULOCYTES # BLD AUTO: 0.01 X10*3/UL (ref 0–0.7)
IMM GRANULOCYTES NFR BLD AUTO: 0.4 % (ref 0–0.9)
INR PPP: 1.1 (ref 0.9–1.1)
LIPASE SERPL-CCNC: 5 U/L (ref 9–82)
LYMPHOCYTES # BLD AUTO: 0.86 X10*3/UL (ref 1.2–4.8)
LYMPHOCYTES NFR BLD AUTO: 30.5 %
MCH RBC QN AUTO: 27.4 PG (ref 26–34)
MCHC RBC AUTO-ENTMCNC: 33.6 G/DL (ref 32–36)
MCV RBC AUTO: 81 FL (ref 80–100)
MONOCYTES # BLD AUTO: 0.35 X10*3/UL (ref 0.1–1)
MONOCYTES NFR BLD AUTO: 12.4 %
NEUTROPHILS # BLD AUTO: 1.38 X10*3/UL (ref 1.2–7.7)
NEUTROPHILS NFR BLD AUTO: 48.9 %
NRBC BLD-RTO: 0 /100 WBCS (ref 0–0)
PLATELET # BLD AUTO: 104 X10*3/UL (ref 150–450)
PMV BLD AUTO: 9.4 FL (ref 7.5–11.5)
POTASSIUM SERPL-SCNC: 4.6 MMOL/L (ref 3.5–5.3)
PROT SERPL-MCNC: 5.5 G/DL (ref 6.4–8.2)
PROTHROMBIN TIME: 12.8 SECONDS (ref 9.8–12.8)
RBC # BLD AUTO: 3.07 X10*6/UL (ref 4.5–5.9)
SODIUM SERPL-SCNC: 132 MMOL/L (ref 136–145)
WBC # BLD AUTO: 2.8 X10*3/UL (ref 4.4–11.3)

## 2023-10-02 PROCEDURE — 96374 THER/PROPH/DIAG INJ IV PUSH: CPT | Mod: 59

## 2023-10-02 PROCEDURE — 82374 ASSAY BLOOD CARBON DIOXIDE: CPT | Performed by: EMERGENCY MEDICINE

## 2023-10-02 PROCEDURE — 96375 TX/PRO/DX INJ NEW DRUG ADDON: CPT

## 2023-10-02 PROCEDURE — 83690 ASSAY OF LIPASE: CPT | Performed by: EMERGENCY MEDICINE

## 2023-10-02 PROCEDURE — 96376 TX/PRO/DX INJ SAME DRUG ADON: CPT

## 2023-10-02 PROCEDURE — 2500000004 HC RX 250 GENERAL PHARMACY W/ HCPCS (ALT 636 FOR OP/ED): Performed by: GENERAL PRACTICE

## 2023-10-02 PROCEDURE — 96372 THER/PROPH/DIAG INJ SC/IM: CPT

## 2023-10-02 PROCEDURE — 84075 ASSAY ALKALINE PHOSPHATASE: CPT | Performed by: EMERGENCY MEDICINE

## 2023-10-02 PROCEDURE — 85025 COMPLETE CBC W/AUTO DIFF WBC: CPT | Performed by: EMERGENCY MEDICINE

## 2023-10-02 PROCEDURE — 74176 CT ABD & PELVIS W/O CONTRAST: CPT | Performed by: RADIOLOGY

## 2023-10-02 PROCEDURE — 85610 PROTHROMBIN TIME: CPT | Performed by: GENERAL PRACTICE

## 2023-10-02 PROCEDURE — 80053 COMPREHEN METABOLIC PANEL: CPT | Performed by: EMERGENCY MEDICINE

## 2023-10-02 PROCEDURE — 36415 COLL VENOUS BLD VENIPUNCTURE: CPT | Performed by: EMERGENCY MEDICINE

## 2023-10-02 PROCEDURE — 99285 EMERGENCY DEPT VISIT HI MDM: CPT | Performed by: GENERAL PRACTICE

## 2023-10-02 RX ORDER — MORPHINE SULFATE 4 MG/ML
4 INJECTION, SOLUTION INTRAMUSCULAR; INTRAVENOUS ONCE
Status: COMPLETED | OUTPATIENT
Start: 2023-10-02 | End: 2023-10-02

## 2023-10-02 RX ORDER — ONDANSETRON HYDROCHLORIDE 2 MG/ML
4 INJECTION, SOLUTION INTRAVENOUS ONCE
Status: COMPLETED | OUTPATIENT
Start: 2023-10-02 | End: 2023-10-02

## 2023-10-02 RX ADMIN — MORPHINE SULFATE 4 MG: 4 INJECTION, SOLUTION INTRAMUSCULAR; INTRAVENOUS at 13:56

## 2023-10-02 RX ADMIN — ONDANSETRON 4 MG: 2 INJECTION INTRAMUSCULAR; INTRAVENOUS at 13:56

## 2023-10-02 RX ADMIN — MORPHINE SULFATE 4 MG: 4 INJECTION, SOLUTION INTRAMUSCULAR; INTRAVENOUS at 18:35

## 2023-10-02 ASSESSMENT — PAIN - FUNCTIONAL ASSESSMENT: PAIN_FUNCTIONAL_ASSESSMENT: 0-10

## 2023-10-02 ASSESSMENT — COLUMBIA-SUICIDE SEVERITY RATING SCALE - C-SSRS
1. IN THE PAST MONTH, HAVE YOU WISHED YOU WERE DEAD OR WISHED YOU COULD GO TO SLEEP AND NOT WAKE UP?: NO
6. HAVE YOU EVER DONE ANYTHING, STARTED TO DO ANYTHING, OR PREPARED TO DO ANYTHING TO END YOUR LIFE?: NO
2. HAVE YOU ACTUALLY HAD ANY THOUGHTS OF KILLING YOURSELF?: NO

## 2023-10-02 ASSESSMENT — PAIN DESCRIPTION - FREQUENCY: FREQUENCY: CONSTANT/CONTINUOUS

## 2023-10-02 ASSESSMENT — PAIN SCALES - GENERAL
PAINLEVEL_OUTOF10: 10 - WORST POSSIBLE PAIN
PAINLEVEL_OUTOF10: 10 - WORST POSSIBLE PAIN
PAINLEVEL_OUTOF10: 7
PAINLEVEL_OUTOF10: 0 - NO PAIN
PAINLEVEL_OUTOF10: 10 - WORST POSSIBLE PAIN

## 2023-10-02 NOTE — ED PROVIDER NOTES
HPI   Chief Complaint   Patient presents with    Abdominal Pain    Back Pain    Vomiting     Pt states that he had gallbladder surgery one week ago, and since then he has been experiencing abdominal pain, back pain and vomiting. Pt is a&ox4, warm and dry. Pt denies any other complaints.       HPI: 68-year-old male with a history of a renal transplant and remote history of prostate cancer status post prostatectomy presents for abdominal pain.  He is 1 week status post lap eduarda with Dr. Pickering from general surgery.  He was doing well in his recovery until 3 days ago when he developed right upper quadrant pain, nausea and vomiting.  He and his wife deny fever.  The pain has no provocative or palliative factors and is nonradiating.      Limitations to history: None  Independent Historians: Patient, wife  External Records Reviewed: [facility], outpatient notes, inpatient notes  ------------------------------------------------------------------------------------------------------------------------------------------  ROS: a ten point review of systems was performed and was negative except as per HPI.  ------------------------------------------------------------------------------------------------------------------------------------------  PMH / PSH: as per HPI, otherwise reviewed in EMR  MEDS: as per HPI, otherwise reviewed in EMR  ALLERGIES: as per HPI, otherwise reviewed in EMR  SocH:  as per HPI, otherwise reviewed in EMR  FH:  as per HPI, otherwise reviewed in EMR  ------------------------------------------------------------------------------------------------------------------------------------------  Physical Exam:  VS: As documented in the triage note and EMR flowsheet from this visit was reviewed  General: Uncomfortable appearing. No acute distress.   Eyes:  Extraocular movements grossly intact. No scleral icterus. No discharge  HEENT:  Normocephalic.  Atraumatic  Neck: Moves neck freely. No gross masses  CV: Regular  rhythm. No murmurs, rubs or gallops   Resp: Clear to auscultation bilaterally. No respiratory distress.    GI: Soft, tenderness to palpation over the right upper quadrant.  No rebound tenderness or guarding.  Lap eduarda incisions appear well-healing  MSK: Symmetric muscle bulk. No deformities. No lower extremity edema.  DSkin: Warm, dry, intact.   Neuro: No focal deficits.  A&O x3.   Psych: Appropriate for situation  ------------------------------------------------------------------------------------------------------------------------------------------  Hospital Course / Medical Decision Making:  Independent Interpretations: CT abd / pelvis  EKG as interpreted by me: NA    MDM: This is a 68-year-old male with a history of a renal transplant remote history of prostate cancer presenting for abdominal pain after undergoing a lap eduarda with general surgery last week.  He was given antiemetics and medication for pain in the ED.  No leukocytosis.  No major electrolyte abnormalities.  CT of the abdomen and pelvis shows postoperative changes with no bowel obstruction, free air or intra-abdominal fluid collection.  I do not feel there is any need for acute surgical intervention at this time.  The patient was admitted to the medicine service for symptom control.    Discussion of Management with Other Providers:   I discussed the patient/results with: Emergency medicine team    Final diagnosis and disposition as below.    Results for orders placed or performed during the hospital encounter of 10/02/23  -CBC and Auto Differential:        Result                      Value             Ref Range           WBC                         2.8 (L)           4.4 - 11.3 x*       nRBC                        0.0               0.0 - 0.0 /1*       RBC                         3.07 (L)          4.50 - 5.90 *       Hemoglobin                  8.4 (L)           13.5 - 17.5 *       Hematocrit                  25.0 (L)          41.0 - 52.0 %        MCV                         81                80 - 100 fL         MCH                         27.4              26.0 - 34.0 *       MCHC                        33.6              32.0 - 36.0 *       RDW                         16.5 (H)          11.5 - 14.5 %       Platelets                   104 (L)           150 - 450 x1*       MPV                         9.4               7.5 - 11.5 fL       Neutrophils %               48.9              40.0 - 80.0 %       Immature Granulocytes *     0.4               0.0 - 0.9 %         Lymphocytes %               30.5              13.0 - 44.0 %       Monocytes %                 12.4              2.0 - 10.0 %        Eosinophils %               7.1               0.0 - 6.0 %         Basophils %                 0.7               0.0 - 2.0 %         Neutrophils Absolute        1.38              1.20 - 7.70 *       Immature Granulocytes *     0.01              0.00 - 0.70 *       Lymphocytes Absolute        0.86 (L)          1.20 - 4.80 *       Monocytes Absolute          0.35              0.10 - 1.00 *       Eosinophils Absolute        0.20              0.00 - 0.70 *       Basophils Absolute          0.02              0.00 - 0.10 *  -Comprehensive Metabolic Panel:        Result                      Value             Ref Range           Glucose                     127 (H)           74 - 99 mg/dL       Sodium                      132 (L)           136 - 145 mm*       Potassium                   4.6               3.5 - 5.3 mm*       Chloride                    102               98 - 107 mmo*       Bicarbonate                 25                21 - 32 mmol*       Anion Gap                   10                10 - 20 mmol*       Urea Nitrogen               21                6 - 23 mg/dL        Creatinine                  2.49 (H)          0.50 - 1.30 *       eGFR                        28 (L)            >60 mL/min/1*       Calcium                     9.6               8.6 - 10.3 m*        Albumin                     3.3 (L)           3.4 - 5.0 g/*       Alkaline Phosphatase        48                33 - 136 U/L        Total Protein               5.5 (L)           6.4 - 8.2 g/*       AST                         17                9 - 39 U/L          Bilirubin, Total            0.7               0.0 - 1.2 mg*       ALT                         9 (L)             10 - 52 U/L    -Lipase:        Result                      Value             Ref Range           Lipase                      5 (L)             9 - 82 U/L     -Protime-INR:        Result                      Value             Ref Range           Protime                     12.8              9.8 - 12.8 s*       INR                         1.1               0.9 - 1.1      -POCT GLUCOSE:        Result                      Value             Ref Range           POCT Glucose                128 (H)           74 - 99 mg/dL    CT abdomen pelvis wo IV contrast   Final Result    1. Postoperative changes, as above.    2. No evidence of bowel obstruction, free intraperitoneal air or    abnormal intra-abdominal fluid collection.    3. Bilateral pleural effusions and basilar airspace consolidations,    as above.          MACRO:    None          Signed by: Constantino Jin 10/2/2023 11:46 AM    Dictation workstation:   [id number]                               No data recorded                Patient History   Past Medical History:   Diagnosis Date    Chronic kidney disease, stage 3 unspecified (CMS/HCC) 09/26/2018    Stage 3 chronic kidney disease    COVID-19 06/18/2020    COVID-19 virus infection    Other long term (current) drug therapy 07/20/2021    High risk medication use    Personal history of other diseases of the circulatory system     Personal history of cardiac murmur    Personal history of other infectious and parasitic diseases 08/17/2015    History of hepatitis    Unspecified kidney failure 08/17/2016    Renal failure     Past Surgical History:    Procedure Laterality Date    ILEOSTOMY  04/25/2017    Ileostomy    ILEOSTOMY CLOSURE  08/17/2015    Ileostomy Closure    OTHER SURGICAL HISTORY  04/21/2017    Right Hemicolectomy    OTHER SURGICAL HISTORY  08/17/2015    Renal Transplant    OTHER SURGICAL HISTORY  08/17/2015    Arteriovenous Surgery Creation Of A-V Fistula    OTHER SURGICAL HISTORY  08/17/2015    Sigmoidoscopy (Fiberoptic, Therapeutic )    PROSTATECTOMY  10/11/2013    Prostatectomy Radical    US GUIDED PERCUTANEOUS PERITONEAL OR RETROPERITONEAL FLUID COLLECTION DRAINAGE  10/20/2022    US GUIDED PERCUTANEOUS PERITONEAL OR RETROPERITONEAL FLUID COLLECTION DRAINAGE 10/20/2022 [facility] CLINICAL LEGACY     Family History   Problem Relation Name Age of Onset    Bone cancer Mother      Other (corona's sarcome of the bone marrow) Mother      Prostate cancer Father      Diabetes Other Family Hist     Hypertension Other Family Hist      Social History     Tobacco Use    Smoking status: Never    Smokeless tobacco: Never   Substance Use Topics    Alcohol use: Yes    Drug use: Yes     Types: Oxycodone       Physical Exam   ED Triage Vitals   Temp Heart Rate Resp BP   10/02/23 0847 10/02/23 0851 10/02/23 0851 10/02/23 0851   37 °C (98.6 °F) 61 18 150/74      SpO2 Temp src Heart Rate Source Patient Position   10/02/23 0851 -- -- 10/02/23 0851   100 %   Sitting      BP Location FiO2 (%)     10/02/23 0851 --     Right arm        Physical Exam    ED Course & MDM        Medical Decision Making      Procedure  Procedures     Hilario Mack,   10/03/23 1048

## 2023-10-03 ENCOUNTER — APPOINTMENT (OUTPATIENT)
Dept: HEMATOLOGY/ONCOLOGY | Facility: HOSPITAL | Age: 68
End: 2023-10-03
Payer: COMMERCIAL

## 2023-10-03 ENCOUNTER — APPOINTMENT (OUTPATIENT)
Dept: UROLOGY | Facility: CLINIC | Age: 68
End: 2023-10-03
Payer: COMMERCIAL

## 2023-10-03 LAB
GLUCOSE BLD MANUAL STRIP-MCNC: 128 MG/DL (ref 74–99)
GLUCOSE BLD MANUAL STRIP-MCNC: 161 MG/DL (ref 74–99)
GLUCOSE BLD MANUAL STRIP-MCNC: 190 MG/DL (ref 74–99)

## 2023-10-03 PROCEDURE — 2500000002 HC RX 250 W HCPCS SELF ADMINISTERED DRUGS (ALT 637 FOR MEDICARE OP, ALT 636 FOR OP/ED): Performed by: FAMILY MEDICINE

## 2023-10-03 PROCEDURE — 82947 ASSAY GLUCOSE BLOOD QUANT: CPT

## 2023-10-03 PROCEDURE — 2500000001 HC RX 250 WO HCPCS SELF ADMINISTERED DRUGS (ALT 637 FOR MEDICARE OP): Performed by: FAMILY MEDICINE

## 2023-10-03 PROCEDURE — 2500000004 HC RX 250 GENERAL PHARMACY W/ HCPCS (ALT 636 FOR OP/ED): Performed by: FAMILY MEDICINE

## 2023-10-03 PROCEDURE — 99254 IP/OBS CNSLTJ NEW/EST MOD 60: CPT | Performed by: SURGERY

## 2023-10-03 PROCEDURE — 2500000004 HC RX 250 GENERAL PHARMACY W/ HCPCS (ALT 636 FOR OP/ED): Performed by: GENERAL PRACTICE

## 2023-10-03 RX ORDER — AZATHIOPRINE 50 MG/1
50 TABLET ORAL DAILY
Status: DISCONTINUED | OUTPATIENT
Start: 2023-10-03 | End: 2023-10-13 | Stop reason: HOSPADM

## 2023-10-03 RX ORDER — KETOCONAZOLE 20 MG/G
CREAM TOPICAL 2 TIMES DAILY
Status: DISCONTINUED | OUTPATIENT
Start: 2023-10-03 | End: 2023-10-04 | Stop reason: ALTCHOICE

## 2023-10-03 RX ORDER — HYDRALAZINE HYDROCHLORIDE 25 MG/1
25 TABLET, FILM COATED ORAL 3 TIMES DAILY
Status: DISCONTINUED | OUTPATIENT
Start: 2023-10-03 | End: 2023-10-13 | Stop reason: HOSPADM

## 2023-10-03 RX ORDER — BENZONATATE 100 MG/1
100 CAPSULE ORAL 3 TIMES DAILY
Status: DISCONTINUED | OUTPATIENT
Start: 2023-10-03 | End: 2023-10-13 | Stop reason: HOSPADM

## 2023-10-03 RX ORDER — ONDANSETRON 4 MG/1
4 TABLET, ORALLY DISINTEGRATING ORAL EVERY 8 HOURS PRN
Status: DISCONTINUED | OUTPATIENT
Start: 2023-10-03 | End: 2023-10-13 | Stop reason: HOSPADM

## 2023-10-03 RX ORDER — INSULIN GLARGINE 100 [IU]/ML
15 INJECTION, SOLUTION SUBCUTANEOUS NIGHTLY
Status: DISCONTINUED | OUTPATIENT
Start: 2023-10-03 | End: 2023-10-04

## 2023-10-03 RX ORDER — CINACALCET 30 MG/1
30 TABLET, FILM COATED ORAL DAILY
Status: DISCONTINUED | OUTPATIENT
Start: 2023-10-03 | End: 2023-10-04 | Stop reason: ALTCHOICE

## 2023-10-03 RX ORDER — INSULIN LISPRO 100 [IU]/ML
4 INJECTION, SOLUTION INTRAVENOUS; SUBCUTANEOUS
Status: DISCONTINUED | OUTPATIENT
Start: 2023-10-03 | End: 2023-10-04

## 2023-10-03 RX ORDER — PREDNISONE 5 MG/1
5 TABLET ORAL EVERY MORNING
Status: DISCONTINUED | OUTPATIENT
Start: 2023-10-03 | End: 2023-10-13 | Stop reason: HOSPADM

## 2023-10-03 RX ORDER — FLUOCINONIDE 0.5 MG/G
CREAM TOPICAL 2 TIMES DAILY
Status: DISCONTINUED | OUTPATIENT
Start: 2023-10-03 | End: 2023-10-04 | Stop reason: ALTCHOICE

## 2023-10-03 RX ORDER — TACROLIMUS 1 MG/G
OINTMENT TOPICAL 2 TIMES DAILY
Status: DISCONTINUED | OUTPATIENT
Start: 2023-10-03 | End: 2023-10-04 | Stop reason: ALTCHOICE

## 2023-10-03 RX ORDER — DOCUSATE SODIUM 100 MG/1
100 CAPSULE, LIQUID FILLED ORAL 2 TIMES DAILY PRN
Status: DISCONTINUED | OUTPATIENT
Start: 2023-10-03 | End: 2023-10-13 | Stop reason: HOSPADM

## 2023-10-03 RX ORDER — MORPHINE SULFATE 4 MG/ML
4 INJECTION, SOLUTION INTRAMUSCULAR; INTRAVENOUS ONCE
Status: COMPLETED | OUTPATIENT
Start: 2023-10-03 | End: 2023-10-03

## 2023-10-03 RX ORDER — PRAVASTATIN SODIUM 40 MG/1
40 TABLET ORAL NIGHTLY
Status: DISCONTINUED | OUTPATIENT
Start: 2023-10-03 | End: 2023-10-13 | Stop reason: HOSPADM

## 2023-10-03 RX ORDER — HEPARIN SODIUM 5000 [USP'U]/ML
5000 INJECTION, SOLUTION INTRAVENOUS; SUBCUTANEOUS EVERY 8 HOURS
Status: DISCONTINUED | OUTPATIENT
Start: 2023-10-03 | End: 2023-10-13

## 2023-10-03 RX ORDER — CARVEDILOL 25 MG/1
25 TABLET ORAL 2 TIMES DAILY
Status: DISCONTINUED | OUTPATIENT
Start: 2023-10-03 | End: 2023-10-13 | Stop reason: HOSPADM

## 2023-10-03 RX ORDER — ACETAMINOPHEN 325 MG/1
650 TABLET ORAL 4 TIMES DAILY PRN
Status: DISCONTINUED | OUTPATIENT
Start: 2023-10-03 | End: 2023-10-13 | Stop reason: HOSPADM

## 2023-10-03 RX ORDER — TACROLIMUS 1 MG/1
2 CAPSULE ORAL
Status: DISCONTINUED | OUTPATIENT
Start: 2023-10-03 | End: 2023-10-04 | Stop reason: ALTCHOICE

## 2023-10-03 RX ORDER — LOSARTAN POTASSIUM 25 MG/1
25 TABLET ORAL DAILY
Status: DISCONTINUED | OUTPATIENT
Start: 2023-10-03 | End: 2023-10-12

## 2023-10-03 RX ORDER — AMLODIPINE BESYLATE 10 MG/1
10 TABLET ORAL DAILY
Status: DISCONTINUED | OUTPATIENT
Start: 2023-10-03 | End: 2023-10-13 | Stop reason: HOSPADM

## 2023-10-03 RX ORDER — DEXTROSE MONOHYDRATE 100 MG/ML
0.3 INJECTION, SOLUTION INTRAVENOUS ONCE AS NEEDED
Status: COMPLETED | OUTPATIENT
Start: 2023-10-03 | End: 2023-10-04

## 2023-10-03 RX ORDER — CHOLECALCIFEROL (VITAMIN D3) 25 MCG
50 TABLET ORAL DAILY
Status: DISCONTINUED | OUTPATIENT
Start: 2023-10-03 | End: 2023-10-13 | Stop reason: HOSPADM

## 2023-10-03 RX ORDER — DEXTROSE 50 % IN WATER (D50W) INTRAVENOUS SYRINGE
25
Status: DISCONTINUED | OUTPATIENT
Start: 2023-10-03 | End: 2023-10-13 | Stop reason: HOSPADM

## 2023-10-03 RX ORDER — PANTOPRAZOLE SODIUM 40 MG/1
40 TABLET, DELAYED RELEASE ORAL
Status: DISCONTINUED | OUTPATIENT
Start: 2023-10-03 | End: 2023-10-13 | Stop reason: HOSPADM

## 2023-10-03 RX ORDER — FERROUS SULFATE 325(65) MG
65 TABLET ORAL 2 TIMES DAILY
Status: DISCONTINUED | OUTPATIENT
Start: 2023-10-03 | End: 2023-10-10

## 2023-10-03 RX ADMIN — MORPHINE SULFATE 4 MG: 4 INJECTION, SOLUTION INTRAMUSCULAR; INTRAVENOUS at 03:39

## 2023-10-03 RX ADMIN — FERROUS SULFATE TAB 325 MG (65 MG ELEMENTAL FE) 65 MG OF IRON: 325 (65 FE) TAB at 20:52

## 2023-10-03 RX ADMIN — PRAVASTATIN SODIUM 40 MG: 40 TABLET ORAL at 20:52

## 2023-10-03 RX ADMIN — BENZONATATE 100 MG: 100 CAPSULE ORAL at 21:00

## 2023-10-03 RX ADMIN — INSULIN GLARGINE 15 UNITS: 100 INJECTION, SOLUTION SUBCUTANEOUS at 23:04

## 2023-10-03 RX ADMIN — HYDRALAZINE HYDROCHLORIDE 25 MG: 25 TABLET ORAL at 20:52

## 2023-10-03 RX ADMIN — HYDROMORPHONE HYDROCHLORIDE 0.2 MG: 0.2 INJECTION, SOLUTION INTRAMUSCULAR; INTRAVENOUS; SUBCUTANEOUS at 18:36

## 2023-10-03 RX ADMIN — PANTOPRAZOLE SODIUM 40 MG: 40 TABLET, DELAYED RELEASE ORAL at 10:54

## 2023-10-03 RX ADMIN — CARVEDILOL 25 MG: 25 TABLET, FILM COATED ORAL at 10:54

## 2023-10-03 RX ADMIN — LOSARTAN POTASSIUM 25 MG: 25 TABLET, FILM COATED ORAL at 11:09

## 2023-10-03 RX ADMIN — HEPARIN SODIUM 5000 UNITS: 5000 INJECTION INTRAVENOUS; SUBCUTANEOUS at 06:03

## 2023-10-03 RX ADMIN — CARVEDILOL 25 MG: 25 TABLET, FILM COATED ORAL at 20:52

## 2023-10-03 RX ADMIN — AMLODIPINE BESYLATE 10 MG: 10 TABLET ORAL at 10:55

## 2023-10-03 RX ADMIN — FERROUS SULFATE TAB 325 MG (65 MG ELEMENTAL FE) 65 MG OF IRON: 325 (65 FE) TAB at 10:54

## 2023-10-03 RX ADMIN — HYDRALAZINE HYDROCHLORIDE 25 MG: 25 TABLET ORAL at 11:09

## 2023-10-03 RX ADMIN — PREDNISONE 5 MG: 5 TABLET ORAL at 10:54

## 2023-10-03 RX ADMIN — HYDROMORPHONE HYDROCHLORIDE 0.2 MG: 0.2 INJECTION, SOLUTION INTRAMUSCULAR; INTRAVENOUS; SUBCUTANEOUS at 14:02

## 2023-10-03 SDOH — ECONOMIC STABILITY: INCOME INSECURITY: IN THE PAST 12 MONTHS, HAS THE ELECTRIC, GAS, OIL, OR WATER COMPANY THREATENED TO SHUT OFF SERVICE IN YOUR HOME?: NO

## 2023-10-03 SDOH — ECONOMIC STABILITY: FOOD INSECURITY: WITHIN THE PAST 12 MONTHS, YOU WORRIED THAT YOUR FOOD WOULD RUN OUT BEFORE YOU GOT MONEY TO BUY MORE.: NEVER TRUE

## 2023-10-03 SDOH — SOCIAL STABILITY: SOCIAL INSECURITY: HAVE YOU HAD THOUGHTS OF HARMING ANYONE ELSE?: NO

## 2023-10-03 SDOH — HEALTH STABILITY: MENTAL HEALTH: HOW OFTEN DO YOU HAVE A DRINK CONTAINING ALCOHOL?: NEVER

## 2023-10-03 SDOH — HEALTH STABILITY: MENTAL HEALTH: HOW OFTEN DO YOU HAVE 6 OR MORE DRINKS ON ONE OCCASION?: NEVER

## 2023-10-03 SDOH — ECONOMIC STABILITY: INCOME INSECURITY: IN THE LAST 12 MONTHS, WAS THERE A TIME WHEN YOU WERE NOT ABLE TO PAY THE MORTGAGE OR RENT ON TIME?: NO

## 2023-10-03 SDOH — SOCIAL STABILITY: SOCIAL NETWORK
IN A TYPICAL WEEK, HOW MANY TIMES DO YOU TALK ON THE PHONE WITH FAMILY, FRIENDS, OR NEIGHBORS?: MORE THAN THREE TIMES A WEEK

## 2023-10-03 SDOH — ECONOMIC STABILITY: HOUSING INSECURITY
IN THE LAST 12 MONTHS, WAS THERE A TIME WHEN YOU DID NOT HAVE A STEADY PLACE TO SLEEP OR SLEPT IN A SHELTER (INCLUDING NOW)?: NO

## 2023-10-03 SDOH — ECONOMIC STABILITY: FOOD INSECURITY: WITHIN THE PAST 12 MONTHS, THE FOOD YOU BOUGHT JUST DIDN'T LAST AND YOU DIDN'T HAVE MONEY TO GET MORE.: NEVER TRUE

## 2023-10-03 SDOH — HEALTH STABILITY: PHYSICAL HEALTH: ON AVERAGE, HOW MANY DAYS PER WEEK DO YOU ENGAGE IN MODERATE TO STRENUOUS EXERCISE (LIKE A BRISK WALK)?: 2 DAYS

## 2023-10-03 SDOH — SOCIAL STABILITY: SOCIAL NETWORK
DO YOU BELONG TO ANY CLUBS OR ORGANIZATIONS SUCH AS CHURCH GROUPS UNIONS, FRATERNAL OR ATHLETIC GROUPS, OR SCHOOL GROUPS?: PATIENT DECLINED

## 2023-10-03 SDOH — HEALTH STABILITY: MENTAL HEALTH: HOW MANY STANDARD DRINKS CONTAINING ALCOHOL DO YOU HAVE ON A TYPICAL DAY?: PATIENT DOES NOT DRINK

## 2023-10-03 SDOH — SOCIAL STABILITY: SOCIAL NETWORK: HOW OFTEN DO YOU GET TOGETHER WITH FRIENDS OR RELATIVES?: THREE TIMES A WEEK

## 2023-10-03 SDOH — SOCIAL STABILITY: SOCIAL INSECURITY: ARE YOU OR HAVE YOU BEEN THREATENED OR ABUSED PHYSICALLY, EMOTIONALLY, OR SEXUALLY BY ANYONE?: NO

## 2023-10-03 SDOH — SOCIAL STABILITY: SOCIAL INSECURITY: DO YOU FEEL UNSAFE GOING BACK TO THE PLACE WHERE YOU ARE LIVING?: NO

## 2023-10-03 SDOH — SOCIAL STABILITY: SOCIAL INSECURITY
WITHIN THE LAST YEAR, HAVE TO BEEN RAPED OR FORCED TO HAVE ANY KIND OF SEXUAL ACTIVITY BY YOUR PARTNER OR EX-PARTNER?: NO

## 2023-10-03 SDOH — SOCIAL STABILITY: SOCIAL INSECURITY: WITHIN THE LAST YEAR, HAVE YOU BEEN AFRAID OF YOUR PARTNER OR EX-PARTNER?: NO

## 2023-10-03 SDOH — HEALTH STABILITY: MENTAL HEALTH
STRESS IS WHEN SOMEONE FEELS TENSE, NERVOUS, ANXIOUS, OR CAN'T SLEEP AT NIGHT BECAUSE THEIR MIND IS TROUBLED. HOW STRESSED ARE YOU?: ONLY A LITTLE

## 2023-10-03 SDOH — SOCIAL STABILITY: SOCIAL INSECURITY
WITHIN THE LAST YEAR, HAVE YOU BEEN KICKED, HIT, SLAPPED, OR OTHERWISE PHYSICALLY HURT BY YOUR PARTNER OR EX-PARTNER?: NO

## 2023-10-03 SDOH — ECONOMIC STABILITY: TRANSPORTATION INSECURITY
IN THE PAST 12 MONTHS, HAS THE LACK OF TRANSPORTATION KEPT YOU FROM MEDICAL APPOINTMENTS OR FROM GETTING MEDICATIONS?: NO

## 2023-10-03 SDOH — SOCIAL STABILITY: SOCIAL INSECURITY: WITHIN THE LAST YEAR, HAVE YOU BEEN HUMILIATED OR EMOTIONALLY ABUSED IN OTHER WAYS BY YOUR PARTNER OR EX-PARTNER?: NO

## 2023-10-03 SDOH — SOCIAL STABILITY: SOCIAL NETWORK: ARE YOU MARRIED, WIDOWED, DIVORCED, SEPARATED, NEVER MARRIED, OR LIVING WITH A PARTNER?: NEVER MARRIED

## 2023-10-03 SDOH — SOCIAL STABILITY: SOCIAL INSECURITY: ARE THERE ANY APPARENT SIGNS OF INJURIES/BEHAVIORS THAT COULD BE RELATED TO ABUSE/NEGLECT?: NO

## 2023-10-03 SDOH — SOCIAL STABILITY: SOCIAL NETWORK: HOW OFTEN DO YOU ATTEND CHURCH OR RELIGIOUS SERVICES?: PATIENT DECLINED

## 2023-10-03 SDOH — ECONOMIC STABILITY: INCOME INSECURITY: HOW HARD IS IT FOR YOU TO PAY FOR THE VERY BASICS LIKE FOOD, HOUSING, MEDICAL CARE, AND HEATING?: NOT VERY HARD

## 2023-10-03 SDOH — HEALTH STABILITY: PHYSICAL HEALTH: ON AVERAGE, HOW MANY MINUTES DO YOU ENGAGE IN EXERCISE AT THIS LEVEL?: PATIENT DECLINED

## 2023-10-03 SDOH — ECONOMIC STABILITY: TRANSPORTATION INSECURITY
IN THE PAST 12 MONTHS, HAS LACK OF TRANSPORTATION KEPT YOU FROM MEETINGS, WORK, OR FROM GETTING THINGS NEEDED FOR DAILY LIVING?: NO

## 2023-10-03 SDOH — ECONOMIC STABILITY: INCOME INSECURITY: HOW HARD IS IT FOR YOU TO PAY FOR THE VERY BASICS LIKE FOOD, HOUSING, MEDICAL CARE, AND HEATING?: NOT HARD AT ALL

## 2023-10-03 SDOH — SOCIAL STABILITY: SOCIAL NETWORK: ARE YOU MARRIED, WIDOWED, DIVORCED, SEPARATED, NEVER MARRIED, OR LIVING WITH A PARTNER?: LIVING WITH PARTNER

## 2023-10-03 SDOH — SOCIAL STABILITY: SOCIAL NETWORK
DO YOU BELONG TO ANY CLUBS OR ORGANIZATIONS SUCH AS CHURCH GROUPS UNIONS, FRATERNAL OR ATHLETIC GROUPS, OR SCHOOL GROUPS?: YES

## 2023-10-03 SDOH — ECONOMIC STABILITY: HOUSING INSECURITY: IN THE LAST 12 MONTHS, HOW MANY PLACES HAVE YOU LIVED?: 1

## 2023-10-03 SDOH — HEALTH STABILITY: MENTAL HEALTH
STRESS IS WHEN SOMEONE FEELS TENSE, NERVOUS, ANXIOUS, OR CAN'T SLEEP AT NIGHT BECAUSE THEIR MIND IS TROUBLED. HOW STRESSED ARE YOU?: NOT AT ALL

## 2023-10-03 SDOH — SOCIAL STABILITY: SOCIAL INSECURITY: DOES ANYONE TRY TO KEEP YOU FROM HAVING/CONTACTING OTHER FRIENDS OR DOING THINGS OUTSIDE YOUR HOME?: NO

## 2023-10-03 SDOH — SOCIAL STABILITY: SOCIAL INSECURITY: DO YOU FEEL ANYONE HAS EXPLOITED OR TAKEN ADVANTAGE OF YOU FINANCIALLY OR OF YOUR PERSONAL PROPERTY?: NO

## 2023-10-03 SDOH — SOCIAL STABILITY: SOCIAL NETWORK: HOW OFTEN DO YOU ATTENT MEETINGS OF THE CLUB OR ORGANIZATION YOU BELONG TO?: PATIENT DECLINED

## 2023-10-03 SDOH — SOCIAL STABILITY: SOCIAL NETWORK: HOW OFTEN DO YOU ATTEND CHURCH OR RELIGIOUS SERVICES?: NEVER

## 2023-10-03 SDOH — SOCIAL STABILITY: SOCIAL NETWORK: HOW OFTEN DO YOU GET TOGETHER WITH FRIENDS OR RELATIVES?: TWICE A WEEK

## 2023-10-03 SDOH — SOCIAL STABILITY: SOCIAL NETWORK: HOW OFTEN DO YOU ATTENT MEETINGS OF THE CLUB OR ORGANIZATION YOU BELONG TO?: NEVER

## 2023-10-03 SDOH — HEALTH STABILITY: PHYSICAL HEALTH: ON AVERAGE, HOW MANY MINUTES DO YOU ENGAGE IN EXERCISE AT THIS LEVEL?: 30 MIN

## 2023-10-03 SDOH — SOCIAL STABILITY: SOCIAL INSECURITY: HAS ANYONE EVER THREATENED TO HURT YOUR FAMILY OR YOUR PETS?: NO

## 2023-10-03 ASSESSMENT — ACTIVITIES OF DAILY LIVING (ADL)
GROOMING: INDEPENDENT
JUDGMENT_ADEQUATE_SAFELY_COMPLETE_DAILY_ACTIVITIES: YES
TOILETING: INDEPENDENT
WALKS IN HOME: INDEPENDENT
HEARING - LEFT EAR: FUNCTIONAL
PATIENT'S MEMORY ADEQUATE TO SAFELY COMPLETE DAILY ACTIVITIES?: YES
FEEDING YOURSELF: INDEPENDENT
DRESSING YOURSELF: INDEPENDENT
BATHING: INDEPENDENT
HEARING - LEFT EAR: FUNCTIONAL
PATIENT'S MEMORY ADEQUATE TO SAFELY COMPLETE DAILY ACTIVITIES?: YES
JUDGMENT_ADEQUATE_SAFELY_COMPLETE_DAILY_ACTIVITIES: YES
HEARING - RIGHT EAR: FUNCTIONAL
HEARING - RIGHT EAR: FUNCTIONAL
TOILETING: INDEPENDENT
ADEQUATE_TO_COMPLETE_ADL: YES
DRESSING YOURSELF: INDEPENDENT
FEEDING YOURSELF: INDEPENDENT
GROOMING: INDEPENDENT
BATHING: INDEPENDENT
WALKS IN HOME: INDEPENDENT
ADEQUATE_TO_COMPLETE_ADL: YES

## 2023-10-03 ASSESSMENT — COLUMBIA-SUICIDE SEVERITY RATING SCALE - C-SSRS
6. HAVE YOU EVER DONE ANYTHING, STARTED TO DO ANYTHING, OR PREPARED TO DO ANYTHING TO END YOUR LIFE?: NO
1. IN THE PAST MONTH, HAVE YOU WISHED YOU WERE DEAD OR WISHED YOU COULD GO TO SLEEP AND NOT WAKE UP?: NO
2. HAVE YOU ACTUALLY HAD ANY THOUGHTS OF KILLING YOURSELF?: NO

## 2023-10-03 ASSESSMENT — COGNITIVE AND FUNCTIONAL STATUS - GENERAL
PATIENT BASELINE BEDBOUND: NO
MOBILITY SCORE: 24
DAILY ACTIVITIY SCORE: 24

## 2023-10-03 ASSESSMENT — PAIN SCALES - GENERAL
PAINLEVEL_OUTOF10: 0 - NO PAIN
PAINLEVEL_OUTOF10: 0 - NO PAIN
PAINLEVEL_OUTOF10: 8
PAINLEVEL_OUTOF10: 0 - NO PAIN
PAINLEVEL_OUTOF10: 0 - NO PAIN
PAINLEVEL_OUTOF10: 8

## 2023-10-03 ASSESSMENT — LIFESTYLE VARIABLES
SKIP TO QUESTIONS 9-10: 1
HOW OFTEN DO YOU HAVE 6 OR MORE DRINKS ON ONE OCCASION: NEVER
AUDIT-C TOTAL SCORE: 0
SKIP TO QUESTIONS 9-10: 1
AUDIT-C TOTAL SCORE: 0
AUDIT-C TOTAL SCORE: 0
HOW OFTEN DO YOU HAVE A DRINK CONTAINING ALCOHOL: NEVER
HOW MANY STANDARD DRINKS CONTAINING ALCOHOL DO YOU HAVE ON A TYPICAL DAY: PATIENT DOES NOT DRINK
AUDIT-C TOTAL SCORE: 0
PRESCIPTION_ABUSE_PAST_12_MONTHS: NO
SUBSTANCE_ABUSE_PAST_12_MONTHS: NO
SKIP TO QUESTIONS 9-10: 1

## 2023-10-03 ASSESSMENT — ENCOUNTER SYMPTOMS
ABDOMINAL PAIN: 1
VOMITING: 1
BACK PAIN: 1

## 2023-10-03 ASSESSMENT — PATIENT HEALTH QUESTIONNAIRE - PHQ9
SUM OF ALL RESPONSES TO PHQ9 QUESTIONS 1 & 2: 0
1. LITTLE INTEREST OR PLEASURE IN DOING THINGS: NOT AT ALL
2. FEELING DOWN, DEPRESSED OR HOPELESS: NOT AT ALL

## 2023-10-03 ASSESSMENT — PAIN - FUNCTIONAL ASSESSMENT: PAIN_FUNCTIONAL_ASSESSMENT: 0-10

## 2023-10-03 NOTE — CONSULTS
Consults Entered in error  Reason For Consult           Past Medical History      Surgical History             Allergies    Review of Systems     Physical Exam     Last Recorded Vitals       Assessment/Plan

## 2023-10-03 NOTE — PROGRESS NOTES
10/03/23 1501   [facility] Disability Status   Are you deaf or do you have serious difficulty hearing? N   Are you blind or do you have serious difficulty seeing, even when wearing glasses? Y  (cataracts)   Because of a physical, mental, or emotional condition, do you have serious difficulty concentrating, remembering, or making decisions? (5 years old or older) N   Do you have serious difficulty walking or climbing stairs? N   Do you have serious difficulty dressing or bathing? N   Because of a physical, mental, or emotional condition, do you have serious difficulty doing errands alone such as visiting the doctor? Y

## 2023-10-03 NOTE — H&P
History Of Present Illness  Manolo Bashir is a 68 y.o. male presenting with lower abdo pain for pst 2 days, pt does have h/o renal transplant he is on immunosuppressive meds, he was recently admitted to hospital for abdominal pain, noted to have chronic cholecystitis and did have lap choli, his pain had improved, however comes abck as above  No fever, no chills  No constipation, no diarrhea  No change in urine  His  is by bedside     Past Medical History  He has a past medical history of Chronic kidney disease, stage 3 unspecified (CMS/HCC) (09/26/2018), COVID-19 (06/18/2020), Other long term (current) drug therapy (07/20/2021), Personal history of other diseases of the circulatory system, Personal history of other infectious and parasitic diseases (08/17/2015), and Unspecified kidney failure (08/17/2016).    Surgical History  He has a past surgical history that includes Prostatectomy (10/11/2013); Ileostomy (04/25/2017); Other surgical history (04/21/2017); Ileostomy closure (08/17/2015); Other surgical history (08/17/2015); Other surgical history (08/17/2015); Other surgical history (08/17/2015); and US guided percutaneous peritoneal or retroperitoneal fluid collection drainage (10/20/2022).     Social History  He reports that he has never smoked. He has never used smokeless tobacco. He reports current alcohol use. He reports current drug use. Drug: Oxycodone.    Family History  Family History   Problem Relation Name Age of Onset    Bone cancer Mother      Other (coorna's sarcome of the bone marrow) Mother      Prostate cancer Father      Diabetes Other Family Hist     Hypertension Other Family Hist         Allergies  Patient has no known allergies.    Review of Systems  Does have nausea vomiting   No chest apin, no shrotness of breath   No cough  No fever  No chills  Further ROS obtained unremarkable     Physical Exam   Well developed well nurished aam, no distress  Heent normal mucosa  Neck supple no  jvd  Cvs regular   Resp good air entry no rales  Abdo soft bs present, does have lap scars, no drainge,  Lower abdo mild tenderness  Ext no edema  Cns alert appropriate, nonfocal  Last Recorded Vitals  /83 (BP Location: Right arm, Patient Position: Lying)   Pulse 66   Temp 36.8 °C (98.2 °F) (Temporal)   Resp 18   Wt 72.6 kg (160 lb)   SpO2 97%     Relevant Results Scheduled medications  amLODIPine, 10 mg, oral, Daily  azaTHIOprine, 50 mg, oral, Daily  benzonatate, 100 mg, oral, TID  carvedilol, 25 mg, oral, BID  cholecalciferol, 50 mcg, oral, Daily  cinacalcet, 30 mg, oral, Daily  ferrous sulfate, 65 mg of iron, oral, BID  fluocinonide, , Topical, BID  heparin, 5,000 Units, subcutaneous, q8h  hydrALAZINE, 25 mg, oral, TID  insulin glargine, 15 Units, subcutaneous, Nightly  insulin lispro, 4 Units, subcutaneous, TID with meals  ketoconazole, , Topical, BID  losartan, 25 mg, oral, Daily  pantoprazole, 40 mg, oral, Daily before breakfast  pravastatin, 40 mg, oral, Nightly  predniSONE, 5 mg, oral, q AM  tacrolimus, 2 mg, oral, q12h ZOË  tacrolimus, , Topical, BID      Continuous medications     PRN medications  PRN medications: acetaminophen, dextrose, dextrose, docusate sodium, glucagon, HYDROmorphone, ondansetron ODT  Results for orders placed or performed during the hospital encounter of 10/02/23 (from the past 24 hour(s))   POCT GLUCOSE   Result Value Ref Range    POCT Glucose 128 (H) 74 - 99 mg/dL   POCT GLUCOSE   Result Value Ref Range    POCT Glucose 190 (H) 74 - 99 mg/dL         CT abdomen pelvis wo IV contrast    Result Date: 10/2/2023  Interpreted By:  Constantino Jin, STUDY: CT ABDOMEN PELVIS WO IV CONTRAST; 10/2/2023 11:28 am   INDICATION: Signs/Symptoms:s/p lap eduarda 1 week ago, now having RUQ pain, N/V, h/o renal transplant.   COMPARISON: CT abdomen and pelvis dated 09/17/2023   ACCESSION NUMBER(S): [id number]   ORDERING CLINICIAN: CATALINO PENNINGTON   TECHNIQUE: Contiguous axial images were obtained  at 3mm slice thickness through the abdomen and pelvis without intravenous contrast administration. Coronal and sagittal reconstructions at 3 mm slice thickness were performed. Intravenous contrast was not given per the referring physician's request.   FINDINGS: The study is severely limited by the lack of intravenous contrast.   LOWER CHEST: Evaluation of the visualized lung bases demonstrate small bilateral pleural effusions, larger on the right than on the left, similar to the prior study. Dependent airspace consolidations are seen bilaterally, and may represent atelectasis and/or pneumonia. The heart is at the upper limits of normal for size, similar to the prior study.   ABDOMEN:   LIVER: The liver is within normal limits for appearance, without evidence of focal masses.   BILE DUCTS: No definite intra or extrahepatic biliary dilatation is identified.   GALLBLADDER: The gallbladder is surgically absent.   PANCREAS: The pancreas is within normal limits for appearance, without evidence of focal masses.   SPLEEN: The spleen is within normal limits for size. No focal splenic mass is seen.   ADRENAL GLANDS: No definite adrenal nodules or masses are seen bilaterally.   KIDNEYS AND URETERS: The native kidneys are severely atrophic bilaterally. A transplanted kidney is seen in the left pelvic fossa. There is no hydronephrosis, hydroureter or renal/ ureteral calculus identified. No definite focal renal mass is seen, though evaluation is severely limited by the lack of intravenous contrast.   PELVIS:   BLADDER: The bladder is decompressed.   REPRODUCTIVE ORGANS: The prostate is within normal limits for appearance.   BOWEL: The patient is status post right hemicolectomy with ileal colonic anastomosis. The residual colon and small bowel are within normal limits for course, caliber and appearance, without evidence of wall thickening or obstruction. No CT evidence of acute diverticulitis is seen.   VESSELS: Scattered  atherosclerotic calcifications are seen throughout the infrarenal abdominal aorta and iliac arteries. The abdominal aorta is within normal limits for course, caliber and appearance, without evidence of aneurysm.   PERITONEUM/RETROPERITONEUM/LYMPH NODES: There is no free intraperitoneal air or free fluid identified. No gross mesenteric or retroperitoneal lymphadenopathy is identified.   BONE AND SOFT TISSUE: There is no evidence of acute fracture identified. No evidence of abdominal wall mass or hernia is identified.       1. Postoperative changes, as above. 2. No evidence of bowel obstruction, free intraperitoneal air or abnormal intra-abdominal fluid collection. 3. Bilateral pleural effusions and basilar airspace consolidations, as above.   MACRO: None   Signed by: Constantino Jin 10/2/2023 11:46 AM Dictation workstation:   [id number]    XR chest 1 view    Result Date: 9/23/2023  Interpreted By:  CONSTANTINO JIN MD MRN: [id number] Patient Name: KELVIN ROB  STUDY: CHEST 1 VIEW  9/23/2023 2:05 pm  INDICATION: sob  COMPARISON: 09/17/2023  ACCESSION NUMBER(S): [id number]  ORDERING CLINICIAN: BOBBI PALMA  TECHNIQUE: A single AP portable radiograph of the chest was obtained.  FINDINGS: Mild diffuse interstitial infiltrates are seen bilaterally, and may represent edema and/or pneumonia. No pneumothorax is identified. The cardiac silhouette is within normal limits for size.      Diffuse interstitial infiltrates bilaterally, as above. Clinical correlation and continued follow-up until clearing is recommended.  MACRO: None.    NM liver spleen    Result Date: 9/22/2023  Interpreted By:  TRAVIS BOSE MD MRN: [id number] Patient Name: KELVIN ROB  STUDY: BILIARY WITH EF W OR W/O CCK;  9/22/2023 4:25 pm  INDICATION: nausea/vomiting, sludge in gallbladder on CT .  COMPARISON: CT of abdomen and pelvis on 09/17/2023  ACCESSION NUMBER(S): [id number]  ORDERING CLINICIAN: NIRANJAN CRUZ  TECHNIQUE: DIVISION OF NUCLEAR MEDICINE HEPATOBILIARY  SCAN (HIDA), QUANTITATIVE  The patient received an intravenous dose of 6.0 mCi of Tc-99m mebrofenin (Choletec).  Sequential images of the upper abdomen were then acquired over the next 60 minutes.  An intravenous infusion of the cholecystokinin (CCK) analogue, Sincalide, was then administered followed by an additional period of imaging.  Computer quantification of gallbladder emptying was also performed  FINDINGS: There is prompt accumulation of activity within the liver and normal subsequent excretion via the biliary ductal system into the small bowel.  The gallbladder first visualizes at about  22 minutes after radiopharmaceutical injection and progressively fills.  After Sincalide administration, there is no contraction of the gallbladder. The gallbladder ejection fraction is calculated to be  0 % (normal above 38%).      1. This study demonstrates patency of cystic duct and common bile duct, no evidence of acute cholecystitis. 2. No contraction of the gallbladder after CCK administration, suggestive of biliary dyskinesia.  I personally reviewed the images/study. This study was interpreted at [place], [place].    XR abdomen 1 view    Result Date: 9/22/2023  Interpreted By:  AARTI MONTEZ MD MRN: [id number] Patient Name: KELVIN ROB  STUDY: ABDOMEN AP VIEW;  9/22/2023 4:22 pm  INDICATION: n/v .  COMPARISON: CT scan from 09/17/2023. KUB from 08/22/2023.  ACCESSION NUMBER(S): [id number]  ORDERING CLINICIAN: BENNY MUNSON  TECHNIQUE:   2 supine views of the abdomen and pelvis were obtained.  FINDINGS: There was   a colonic anastomosis in the region of the left side of the transverse colon, and a presume small-bowel anastomosis in the mid right abdomen. There is mild-to-moderate stool and gas in the left transverse colon down through the rectum. There is mild gas in nondilated loops of small bowel in the central abdomen.. Surgical clips in the left pelvis consistent with known  left pelvic renal allograft. There was no gross organomegaly. Arterial calcifications in the iliac arteries. No destructive bone lesion.  Sclerotic arthritic changes in both SI joints. The extreme lung bases were clear. Cardiomegaly.      Previous intestinal surgery.  Retained colonic stool and gas as described. Mild small-bowel gas without suspicious small bowel dilatation.  MACRO: None    US chest    Result Date: 9/19/2023  Interpreted By:  ASIF BAUTISTA CNP MRN: [id number] Patient Name: KELVIN ROB  STUDY: US CHEST; Right;  9/19/2023 2:20 pm  INDICATION: thoracentesis .  COMPARISON: None.  ACCESSION NUMBER(S): [id number]  ORDERING CLINICIAN: RAIMUNDO LEOS  TECHNIQUE: Limited ultrasound of the chest for thoracentesis  FINDINGS: Focused ultrasound examination of the right chest demonstrates scant pleural effusion.      Scant Right pleural effusion, no thoracentesis indicated.  MACRO: None    MR lumbar spine w and wo IV contrast    Result Date: 9/19/2023  Interpreted By:  REY TAYLOR MD and DRE GRANT MD MRN: [id number] Patient Name: KELVIN ROB  STUDY: MR L-SPINE WO/W CONTRAST;  9/19/2023 9:59 am  INDICATION: intractable back pain - hx of prostate cancer, DM2 rule out infection, mets .  COMPARISON: CT L-spine 09/17/2023, CT abdomen pelvis 09/17/2023  ACCESSION NUMBER(S): [id number]  ORDERING CLINICIAN: RADHA BALDERRAMA  TECHNIQUE: Sagittal T1, T2, STIR, axial T1 and T2 weighted images of the lumbar spine were acquired. Following administration of 15 mL Dotarem, additional T1 weighted images were obtained.  FINDINGS: For labeling purposes, the last intervertebral body disc space is labeled L5-S1.  Vertebrae: The height, alignment, and signal of the lumbar vertebral bodies are preserved.  Intervertebral Discs: Multilevel intervertebral body disc desiccation. Otherwise, vertebral body heights are overall well-maintained.  Conus medullaris: The lower thoracic cord appears unremarkable. The conus medullaris  terminates appropriately at L1.  T12-L1: There is no significant central canal or neural foraminal stenosis.  L1-2: There is no significant central canal or neural foraminal stenosis. Mild ligamentum flavum hypertrophy and facet arthropathy with posterior indentation of the thecal sac.  L2-3: There is no significant central canal or neural foraminal stenosis. Moderate ligamentum flavum hypertrophy and facet arthropathy with posterior indentation of the thecal sac.  L3-4: There is a focus of abnormal signal in the right-sided L4 pedicle best appreciated on parasagittal T2 weighted image 7/23. There is no appreciable degenerative change in the adjacent facet joint. No associated contrast enhancement. Consider incidental osseous hemangioma, stress fracture as well as osteoid osteoma no measurable central canal stenosis. Moderate ligamentum flavum hypertrophy and facet arthropathy with posterior indentation thecal sac. There is resultant mild bilateral neural foraminal stenosis.  L4-5: There is no significant central canal or neural foraminal stenosis. Moderate ligamentum flavum hypertrophy and facet arthropathy.  L5-S1: There is no significant central canal or neural foraminal stenosis. There is a focus of sclerosis in the S2 vertebral body that can not be further characterized and likely represents a bone island. No associated contrast enhancement.  Bilateral native kidneys are atrophic. There is partial visualization of a left iliac fossa renal transplant.      1. Nonspecific focus of abnormal signal in the right-sided pedicle of L4 can not be further characterized but is consistent with the presence of osseous hemangioma, stress fracture or osteoid osteoma. No evidence of discitis or osteomyelitis. 2. Bone island in the S2 vertebral body 3. No evidence of metastatic disease to the lumbar spine. 4. Mild-to-moderate multilevel degenerative change of the lumbar spine with ligamentum flavum hypertrophy, facet  arthropathy, and mild neural foraminal stenosis at L3-L4.  I personally reviewed the images/study, and I agree with the findings as stated above. This study was interpreted at [facility], [place], [place].    US right upper quadrant    Result Date: 9/19/2023  Interpreted By:  PALLAVI MCGRAW MD STUDY: Right Upper Quadrant Ultrasound; 9/19/2023 6:35 AM  INDICATION: Biliary colic.  COMPARISON: CT AP 9/17/23, 9/13/23 and XR abdomen 8/22/23.  ACCESSION NUMBER(S): [id number]  ORDERING CLINICIAN: RADHA BALDERRAMA PA-C  TECHNIQUE: Ultrasound of the Right Upper Quadrant.  FINDINGS: LIVER: The liver appears sonographically normal.  Normal echogenicity.  The liver appears to be homogeneous with no focal lesion   GALLBLADDER: The gallbladder : the gallbladder is mildly contracted.  There is no focal tenderness to gallbladder palpation.  No evidence of stone disease.  The gallbladder wall appears to be mildly prominent but I think this is in large part related to the fact that this is a contracted gallbladder  BILE DUCTS: The common bile duct measures 0.6 cm.  There is no intrahepatic biliary dilatation.   PANCREAS: The pancreas : The pancreas appears to be sonographically normal--- suboptimal visualization of the pancreatic tail--- there may be one or 2 punctate calcifications in the pancreas.  Correlate with any previous history of pancreatitis  RIGHT KIDNEY: The right kidney is surgically absent.  One can see a right pleural effusion and there is a trace amount of free fluid in the abdomen        Suboptimal visualization of the pancreas but what we see appears normal------ there may be one or 2 punctate calcifications of the pancreas.  Correlate with any previous history of pancreatitis  Partially contracted gallbladder but no gross evidence of stone disease or pericholecystic fluid -----no tenderness to gallbladder palpation  Surgically absent kidney  No biliary ductal dilatation   Signed by  Zac Dillon MD    CT lumbar spine wo IV contrast    Result Date: 9/17/2023  Interpreted By:  BRITTANY VASQUEZ MD MRN: [id number] Patient Name: KELVIN ROB  STUDY: CT L-SPINE WO CONTRAST  9/17/2023 5:10 pm  INDICATION: diffuse and worsening abd pain, history of renal transplant  COMPARISON: None.  ACCESSION NUMBER(S): [id number]  ORDERING CLINICIAN: ROXANNE LAZO  TECHNIQUE: Axial CT images of the lumbar spine are obtained. Axial, coronal and sagittal reconstructions are provided for review.  FINDINGS: There are 5 lumbar type non rib-bearing vertebral bodies, with the lowest well-formed intervertebral disc space labeled L5-S1.  Lumbar vertebral alignment is maintained, without significant spondylolisthesis.  Lumbar vertebral body heights are intact, without evidence of compression fractures, although multilevel insufficiency endplate changes with associated Schmorl's nodes are present, most conspicuous along the superior endplates of L1, L2 and L3 and inferior endplate of L3.  Posterior elements of the lumbar spine do not demonstrate any acute abnormalities. No displaced spinous or transverse process fracture or abnormal intra spinous distance widening is present. Low corticated ossific fragment in the expected location of the transverse processes are of T12 are favored to be non fused ossification centers.  Facet joints are preserved without evidence of perching or subluxation.  Mild intervertebral disc height loss is present at L2-L3 and L1-L2.  Although the exam is not optimized to assess the spinal canal, there is likely at least mild-to-moderate spinal canal narrowing possible at the level of L4-L5 due to bulging disc and ligamentum flavum thickening.  Mild neural foraminal stenosis is present at the levels of L3-L4, L4-L5 and L5-S1 due to bulging disc and endplate spurring, worst at L3-L4 on the left.  Paraspinal musculature does not demonstrate any acute abnormalities.      1.  No  evidence of acute trauma or high-grade stenosis in the lumbar spine. 2. Multilevel degenerative changes of the lumbar spine, with likely mild spinal canal narrowing present at the level of L4-L5 and mild-to-moderate neural foraminal stenosis present at the levels of L3-L4 due to combination of bulging disc, endplate spurring and hypertrophic facet changes.  MACRO: None    CT abdomen pelvis wo IV contrast    Result Date: 9/17/2023  Interpreted By:  BRITTANY VASQUEZ MD MRN: [id number] Patient Name: KELVIN ROB  STUDY: CT ABDOMEN AND PELVIS WO CONTRAST;  9/17/2023 5:10 pm  INDICATION: diffuse and worsening abd pain, history of renal transplant .  COMPARISON: CT abdomen and pelvis dated 09/13/2023  ACCESSION NUMBER(S): [id number]  ORDERING CLINICIAN: ROXANNE LAZO  TECHNIQUE: CT of the abdomen and pelvis was performed. Contiguous axial images were obtained at 3 mm slice thickness through the abdomen and pelvis. Coronal and sagittal reconstructions at 3 mm slice thickness were performed.  No intravenous or oral contrast agents were administered.  FINDINGS: Please note that the evaluation of vessels, lymph nodes and organs is limited without intravenous contrast.  LOWER CHEST: In the interim since prior study on 09/13/2023, new large right-sided and moderate to large left-sided pleural effusions are present with associated ground-glass opacities and atelectatic changes in the lower lobes.  Heart is mildly enlarged without pericardial effusion.  Small hiatal hernia is present.  ABDOMEN:  LIVER: Within limits of noncontrast exam liver does not demonstrate any acute abnormalities.  BILE DUCTS: No intrahepatic or extrahepatic biliary dilatation is present.  GALLBLADDER: Gallbladder does not demonstrate any wall thickening or pericholecystic stranding.  PANCREAS: No new pancreatic ductal dilatation or pancreatic stranding is present.  SPLEEN: Spleen is unchanged in appearance to prior study, without evidence  of new abnormalities.  ADRENAL GLANDS: Bilateral adrenal glands are unremarkable in appearance.  KIDNEYS AND URETERS: Native kidneys are atrophic without evidence of hydronephrosis or nephrolithiasis. A transplanted kidney is present in the left lower quadrant of the pelvis in the abdomen, and is not demonstrate any radiopaque stones or hydronephrosis.  PELVIS:  BLADDER: Bladder is nondistended and demonstrates mildly thickened wall.  REPRODUCTIVE ORGANS: No pelvic masses are present. Prostate appears to be surgically absent.  BOWEL: Stomach is unremarkable in appearance. Postsurgical changes of partial small-bowel and large bowel resection are evident, without abnormal bowel dilatation or focal inflammatory changes present in the abdomen and pelvis. Several diverticula are present in the remaining sigmoid colon without evidence of acute diverticulitis. Appendix is not definitely identified, although no acute inflammatory changes are present in the right lower quadrant in the its expected location.  VESSELS: There is no aneurysmal dilatation of the abdominal aorta. The IVC appears normal. Scattered atherosclerotic plaques are present in the abdominal aorta.  PERITONEUM/RETROPERITONEUM/LYMPH NODES: Mild nonspecific mesenteric haziness is present in the central abdomen in the epigastric region, slightly more conspicuous compared to prior exam on 09/13/2023, without evidence of fat stranding, free fluid, or thick-walled collections. No free air is identified. No abdominopelvic lymphadenopathy is present.  ABDOMINAL WALL: Soft tissues of the abdominal wall are unremarkable in appearance.  BONES: No suspicious osseous lesions are identified.      1.  Large right-sided and moderate to large left-sided pleural effusions are present in the lungs bilaterally with mild associated ground-glass in the atelectatic changes in the lower lobes, new since prior exam on 09/13/2023. 2. Transplanted kidney is present in the left lower  quadrant of the abdomen/pelvis, without evidence of hydronephrosis or nephrolithiasis. 3. Slight nonspecific mesenteric haziness in the epigastric region of the abdomen, new/increased since prior CT on 09/13/2023. Correlate with cardiac function. 4. Postsurgical changes of small and large bowel resection, without evidence of abnormal bowel dilatation or focal inflammatory wall thickening changes in the abdomen or pelvis. 5. Several diverticula are present in the sigmoid colon without evidence of acute diverticulitis. 6. Small hiatal hernia.   MACRO: None    XR chest 2 view    Result Date: 9/17/2023  Interpreted By:  BOOGIE ALBARADO MD MRN: [id number] Patient Name: KELVIN ROB  STUDY: Chest dated  9/17/2023.  INDICATION: cp sob  COMPARISON: Chest dated 09/13/2020.  ACCESSION NUMBER(S): [id number]  ORDERING CLINICIAN: ROXANNE LAZO  TECHNIQUE: PA and lateral radiograph of the chest.  FINDINGS: There are ill-defined bibasilar opacities most evident in the central right lower lung zone.  No pneumothorax or effusion is evident. The cardiomediastinal silhouette is  enlarged but similar to the prior exam.A stent is seen over the left axilla.      Ill-defined bibasilar opacities which could represent atelectasis and/or pneumonitis.    Electrocardiogram 12 Lead    Result Date: 9/14/2023  Please see ED Provider Note for formal interpretation Confirmed by Aamir Trent ([id number]) on 9/14/2023 4:03:48 AM    CT abdomen pelvis wo IV contrast    Result Date: 9/13/2023  Interpreted By:  FLEX TOLEDO MD and CHETAN BRUNNER MD MRN: [id number] Patient Name: KELVIN ROB  STUDY: CT ABDOMEN AND PELVIS WO CONTRAST;  9/13/2023 5:42 pm  INDICATION: L flank pain, h/o of kidney transplant, Lie Flat: Yes .  COMPARISON: CT chest abdomen pelvis 08/20/2023  ACCESSION NUMBER(S): [id number]  ORDERING CLINICIAN: RAMY DÍAZ  TECHNIQUE: CT of the abdomen and pelvis was performed. Contiguous axial images were obtained at 3 mm slice  thickness through the abdomen and pelvis. Coronal and sagittal reconstructions at 3 mm slice thickness were performed.  No intravenous contrast was administered; positive oral contrast was given.  FINDINGS: Please note that the evaluation of vessels, lymph nodes and organs is limited without intravenous contrast.  LOWER CHEST: Resolution of small bilateral pleural effusions. Similar asymmetric enlargement of the left atrium and left ventricle. No significant pericardial effusion. Small sliding hiatal hernia is again noted.  ABDOMEN:  LIVER: The liver is normal in size without evidence of focal liver lesions.  BILE DUCTS: The intrahepatic and extrahepatic ducts are not dilated.  GALLBLADDER: The gallbladder is nondistended and without evidence of radiopaque stones. There is layering hyperdense material within the gallbladder which may relate to biliary sludge.  PANCREAS: The pancreas appears unremarkable without evidence of ductal dilatation or masses.  SPLEEN: The spleen is normal in size without focal lesions.  ADRENAL GLANDS: Bilateral adrenal glands appear normal.  KIDNEYS AND URETERS: Redemonstration of severely atrophic kidneys with intraparenchymal calcifications. No hydroureteronephrosis or nephroureterolithiasis.  Similar appearance of the left iliac fossa transplant kidney with mild perinephric fat stranding. No hydronephrosis or nephroureterolithiasis of the transplant kidney.  PELVIS:  BLADDER: There is mild circumferential mural thickening of the urinary bladder with mild perivesical fat stranding.  REPRODUCTIVE ORGANS: No pelvic masses.  BOWEL: Small hiatal hernia. Postsurgical changes from right hemicolectomy with intact ileocolonic anastomosis in the left upper quadrant. An additional intact small bowel anastomosis is noted in the right upper quadrant. The small and large bowel are normal in caliber without wall thickening. Scattered colonic diverticula without evidence of acute diverticulitis.   VESSELS: Mild atherosclerotic changes of the abdominal aorta and its branches without aneurysmal dilatation. Severe calcifications of the bilateral internal iliac arteries are again noted.  PERITONEUM/RETROPERITONEUM/LYMPH NODES: No ascites or free air, no fluid collection.  No abdominopelvic lymphadenopathy is present.  ABDOMINAL WALL: Similar subcutaneous fat stranding along the left lower rectus abdominus muscle. Mild diffuse body wall edema.  BONES: No suspicious osseous lesions are identified. Degenerative discogenic disease is noted in the lower thoracic and lumbar spine, most prominently at T10-11 with moderate disc height loss, degenerative endplate changes, and diffuse disc bulge which results moderate left and mild right neural foraminal narrowing.      1. Circumferential urinary bladder wall thickening with perivesical fat stranding which may be seen in the setting of cystitis. 2. Status post left iliac fossa renal transplant with similar mild perinephric fat stranding, nonspecific. 3. Similar mild anasarca. 4. Resolution of small bilateral pleural effusions. 5. Additional findings as above.  I personally reviewed the images/study and I agree with the findings as stated. This study was interpreted at [place], [place].  MACRO: None    CT head wo IV contrast    Result Date: 9/13/2023  Interpreted By:  LORRI MOBLEY MD MRN: [id number] Patient Name: KELVIN ROB  STUDY: CT HEAD WO CONTRAST;  9/13/2023 5:42 pm  INDICATION: headache, Lie Flat: Yes .  COMPARISON: None.  ACCESSION NUMBER(S): [id number]  ORDERING CLINICIAN: RAMY DÍAZ  TECHNIQUE: Noncontrast axial CT scan of head was performed. Angled reformats in brain and bone windows were generated. The images were reviewed in bone, brain, blood and soft tissue windows.  FINDINGS: CSF Spaces: The ventricles, sulci and basal cisterns are within normal limits. There is no extraaxial fluid collection.  Parenchyma:  Mild degree of nonspecific white matter hypodensity is compatible with microangiopathy. The grey-white differentiation is intact. There is no mass effect or midline shift.  There is no intracranial hemorrhage.  Calvarium: The calvarium is unremarkable. Intracranial vascular calcifications.  Paranasal sinuses and mastoids: Visualized paranasal sinuses and mastoids are clear.      No acute intracranial hemorrhage or mass effect.  Mild degree of nonspecific white matter hypodensities compatible with microangiopathy.  MACRO: None    XR chest 2 view    Result Date: 9/13/2023  Interpreted By:  BALJEET HORTON MD STUDY: Chest Radiographs;  9/13/2023 10:32 AM.  INDICATION: Unspecified chest pain.  COMPARISON: Chest radiographs 5/6/2023.  ACCESSION NUMBER(S): [id number]  ORDERING CLINICIAN: JEFFREY VILLAGRAN MD  TECHNIQUE:  Frontal and lateral chest.  FINDINGS:  CARDIOMEDIASTINAL SILHOUETTE: Cardiomediastinal silhouette is normal in size and configuration.  LUNGS: Possible 8 mm pulmonary nodule left upper lobe, not definitively seen on prior exams. No consolidative airspace disease. No pleural effusion. No pneumothorax.  ABDOMEN: No remarkable upper abdominal findings.  BONES: No acute osseous changes.      Possible 8 mm pulmonary nodule left upper lobe, not definitively seen on prior exams. Follow-up chest CT advised.   Signed by Baljeet Horton MD        Assessment/Plan   Active Problems:  There are no active Hospital Problems.  Abdominal pain, unclear etiology , CT of abdo no acute process  Check UA  S/p lap choli surgery following, no acute process  Clears po  Pain control   S/p renal transplant will cont with meds see orders  Dvt ppx     Noted input from surgery   Lul Aldana MD

## 2023-10-03 NOTE — PROGRESS NOTES
10/03/23 1502   Physical Activity   On average, how many days per week do you engage in moderate to strenuous exercise (like a brisk walk)? 2 days  (outpatient therapy)   On average, how many minutes do you engage in exercise at this level? 30 min   Financial Resource Strain   How hard is it for you to pay for the very basics like food, housing, medical care, and heating? Not very   Housing Stability   In the last 12 months, was there a time when you were not able to pay the mortgage or rent on time? N   In the last 12 months, how many places have you lived? 1   In the last 12 months, was there a time when you did not have a steady place to sleep or slept in a shelter (including now)? N   Transportation Needs   In the past 12 months, has lack of transportation kept you from medical appointments or from getting medications? no   In the past 12 months, has lack of transportation kept you from meetings, work, or from getting things needed for daily living? No   Food Insecurity   Within the past 12 months, you worried that your food would run out before you got the money to buy more. Never true   Within the past 12 months, the food you bought just didn't last and you didn't have money to get more. Never true   Stress   Do you feel stress - tense, restless, nervous, or anxious, or unable to sleep at night because your mind is troubled all the time - these days? Only a littl   Social Connections   In a typical week, how many times do you talk on the phone with family, friends, or neighbors? More than 3   How often do you get together with friends or relatives? Three times   How often do you attend Worship or Oriental orthodox services? Never   Do you belong to any clubs or organizations such as Worship groups, unions, fraternal or athletic groups, or school groups? Yes   How often do you attend meetings of the clubs or organizations you belong to? Never   Are you , , , , never , or living  with a partner? Living with  (30 years)   Intimate Partner Violence   Within the last year, have you been afraid of your partner or ex-partner? No   Within the last year, have you been humiliated or emotionally abused in other ways by your partner or ex-partner? No   Within the last year, have you been kicked, hit, slapped, or otherwise physically hurt by your partner or ex-partner? No   Alcohol Use   Q1: How often do you have a drink containing alcohol? Never   Q2: How many drinks containing alcohol do you have on a typical day when you are drinking? None   Q3: How often do you have six or more drinks on one occasion? Never   Utilities   In the past 12 months has the electric, gas, oil, or water company threatened to shut off services in your home? No

## 2023-10-03 NOTE — CONSULTS
"Assessment/Plan     Consults  Subjective     Abdominal Pain  Associated symptoms include vomiting.   Back Pain  Associated symptoms include abdominal pain.   Vomiting   Associated symptoms include abdominal pain.     67 yo man with multiple medical conditions who iss roughly 10 days post op lap eduarda.  [place] [facility] [place] of epigastric abdominal pain associated with nausea and vomiting.  His symptoms are often made worse after meal.   Work-up included imaging that shows a contracted gallbladder without gallstones.  Underwent HIDA scan that showed filling of the gallbladder but 0% gallbladder ejection fraction suggesting biliary dyskinesia.    He was taken to surgery on 9/24/2023 for laparoscopic cholecystectomy.  He did well following surgery and was discharged home.  Path showed chronic cholecysitis    Comes back now with \"pain all over \"especially in his abdomen.  He has had some nausea and vomiting.  Work-up is included a CT scan that shows postsurgical changes      PMH/PSH:    ESRD s/p renal transplant , CKD3 (baseline Cr now 2-2.9), angina (60-65% LVEF 2/2021), HTN, T2DM , prostate cancer s/p radical prostatectomy and HCV d/t transfusion (s/p Harvoni, viral load undetectable in 2019), recent admission for pneumonia and [facility]IAH discharged on 8/24/23,    Review of Systems      Objective     Vital signs for last 24 hours:  Temp:  [36.5 °C (97.7 °F)-36.8 °C (98.2 °F)] 36.8 °C (98.2 °F)  Heart Rate:  [5-70] 66  Resp:  [12-18] 18  BP: (151-179)/(58-92) 179/83    Intake/Output this shift:  No intake/output data recorded.    Physical Exam  Chronic ill appearing. Comfortable  Abdomen distended, soft, minimal tenderness.  Incisions clean dry and intact    Labs  CBC:   Lab Results   Component Value Date    WBC 2.8 (L) 10/02/2023    RBC 3.07 (L) 10/02/2023       CT abdomen pelvis wo IV contrast    Result Date: 10/2/2023  Interpreted By:  Constantino Jin, STUDY: CT ABDOMEN PELVIS WO IV CONTRAST; 10/2/2023 " 11:28 am   INDICATION: Signs/Symptoms:s/p lap eduarda 1 week ago, now having RUQ pain, N/V, h/o renal transplant.   COMPARISON: CT abdomen and pelvis dated 09/17/2023   ACCESSION NUMBER(S): [id number]   ORDERING CLINICIAN: CATALINO PENNINGTON   TECHNIQUE: Contiguous axial images were obtained at 3mm slice thickness through the abdomen and pelvis without intravenous contrast administration. Coronal and sagittal reconstructions at 3 mm slice thickness were performed. Intravenous contrast was not given per the referring physician's request.   FINDINGS: The study is severely limited by the lack of intravenous contrast.   LOWER CHEST: Evaluation of the visualized lung bases demonstrate small bilateral pleural effusions, larger on the right than on the left, similar to the prior study. Dependent airspace consolidations are seen bilaterally, and may represent atelectasis and/or pneumonia. The heart is at the upper limits of normal for size, similar to the prior study.   ABDOMEN:   LIVER: The liver is within normal limits for appearance, without evidence of focal masses.   BILE DUCTS: No definite intra or extrahepatic biliary dilatation is identified.   GALLBLADDER: The gallbladder is surgically absent.   PANCREAS: The pancreas is within normal limits for appearance, without evidence of focal masses.   SPLEEN: The spleen is within normal limits for size. No focal splenic mass is seen.   ADRENAL GLANDS: No definite adrenal nodules or masses are seen bilaterally.   KIDNEYS AND URETERS: The native kidneys are severely atrophic bilaterally. A transplanted kidney is seen in the left pelvic fossa. There is no hydronephrosis, hydroureter or renal/ ureteral calculus identified. No definite focal renal mass is seen, though evaluation is severely limited by the lack of intravenous contrast.   PELVIS:   BLADDER: The bladder is decompressed.   REPRODUCTIVE ORGANS: The prostate is within normal limits for appearance.   BOWEL: The patient is  status post right hemicolectomy with ileal colonic anastomosis. The residual colon and small bowel are within normal limits for course, caliber and appearance, without evidence of wall thickening or obstruction. No CT evidence of acute diverticulitis is seen.   VESSELS: Scattered atherosclerotic calcifications are seen throughout the infrarenal abdominal aorta and iliac arteries. The abdominal aorta is within normal limits for course, caliber and appearance, without evidence of aneurysm.   PERITONEUM/RETROPERITONEUM/LYMPH NODES: There is no free intraperitoneal air or free fluid identified. No gross mesenteric or retroperitoneal lymphadenopathy is identified.   BONE AND SOFT TISSUE: There is no evidence of acute fracture identified. No evidence of abdominal wall mass or hernia is identified.       1. Postoperative changes, as above. 2. No evidence of bowel obstruction, free intraperitoneal air or abnormal intra-abdominal fluid collection. 3. Bilateral pleural effusions and basilar airspace consolidations, as above.   MACRO: None   Signed by: Constantino Jin 10/2/2023 11:46 AM Dictation workstation:   [id number]       Results for orders placed or performed during the hospital encounter of 10/02/23 (from the past 24 hour(s))   POCT GLUCOSE   Result Value Ref Range    POCT Glucose 128 (H) 74 - 99 mg/dL   POCT GLUCOSE   Result Value Ref Range    POCT Glucose 190 (H) 74 - 99 mg/dL         68-year-old male with multiple medical comorbid conditions.  Status post lap eduarda 10 days ago.  Has abdominal pain.  Etiology unclear.  CT scan shows no obstruction only postoperative changes.    Agree with observation.  Can have clear liquids and advance as tolerated    No surgical indications.  General surgery to follow

## 2023-10-03 NOTE — PROGRESS NOTES
Transitional Care Coordination Progress Note:  Plan per Medical/Surgical team: treatment of abd pain with morphine, protonix, clear liquids  Status: ED  Payor source: [place]  Discharge disposition: home with sign other  Potential Barriers: 1 week POD becky lerner  ADOD: 10/5/2023  BILL Allan RN, BSN Transitional Care Coordinator ED# [phone]

## 2023-10-03 NOTE — PROGRESS NOTES
10/03/23 1501   Current Planned Discharge Disposition   Current Planned Discharge Disposition Home

## 2023-10-03 NOTE — PROGRESS NOTES
10/03/23 1452   Discharge Planning   Living Arrangements Spouse/significant other   Support Systems Spouse/significant other;Children   Assistance Needed denies   Type of Residence Private residence   Number of Stairs to Enter Residence 4   Number of Stairs Within Residence 12   Do you have animals or pets at home? No   Home or Post Acute Services None   Patient expects to be discharged to: home with sign other   Does the patient need discharge transport arranged? No

## 2023-10-04 ENCOUNTER — APPOINTMENT (OUTPATIENT)
Dept: PHYSICAL THERAPY | Facility: HOSPITAL | Age: 68
End: 2023-10-04
Payer: COMMERCIAL

## 2023-10-04 LAB
ANION GAP SERPL CALC-SCNC: 8 MMOL/L (ref 10–20)
APPEARANCE UR: CLEAR
BILIRUB UR STRIP.AUTO-MCNC: NEGATIVE MG/DL
BUN SERPL-MCNC: 20 MG/DL (ref 6–23)
CALCIUM SERPL-MCNC: 9.9 MG/DL (ref 8.6–10.3)
CHLORIDE SERPL-SCNC: 105 MMOL/L (ref 98–107)
CO2 SERPL-SCNC: 26 MMOL/L (ref 21–32)
COLOR UR: YELLOW
CREAT SERPL-MCNC: 2.43 MG/DL (ref 0.5–1.3)
ERYTHROCYTE [DISTWIDTH] IN BLOOD BY AUTOMATED COUNT: 16.3 % (ref 11.5–14.5)
GFR SERPL CREATININE-BSD FRML MDRD: 28 ML/MIN/1.73M*2
GLUCOSE BLD MANUAL STRIP-MCNC: 102 MG/DL (ref 74–99)
GLUCOSE BLD MANUAL STRIP-MCNC: 119 MG/DL (ref 74–99)
GLUCOSE BLD MANUAL STRIP-MCNC: 161 MG/DL (ref 74–99)
GLUCOSE BLD MANUAL STRIP-MCNC: 211 MG/DL (ref 74–99)
GLUCOSE BLD MANUAL STRIP-MCNC: 51 MG/DL (ref 74–99)
GLUCOSE BLD MANUAL STRIP-MCNC: 61 MG/DL (ref 74–99)
GLUCOSE BLD MANUAL STRIP-MCNC: 95 MG/DL (ref 74–99)
GLUCOSE SERPL-MCNC: 48 MG/DL (ref 74–99)
GLUCOSE UR STRIP.AUTO-MCNC: NEGATIVE MG/DL
HCT VFR BLD AUTO: 25 % (ref 41–52)
HGB BLD-MCNC: 8.4 G/DL (ref 13.5–17.5)
KETONES UR STRIP.AUTO-MCNC: NEGATIVE MG/DL
LEUKOCYTE ESTERASE UR QL STRIP.AUTO: NEGATIVE
MCH RBC QN AUTO: 27.5 PG (ref 26–34)
MCHC RBC AUTO-ENTMCNC: 33.6 G/DL (ref 32–36)
MCV RBC AUTO: 82 FL (ref 80–100)
NITRITE UR QL STRIP.AUTO: NEGATIVE
NRBC BLD-RTO: 0 /100 WBCS (ref 0–0)
PH UR STRIP.AUTO: 7 [PH]
PLATELET # BLD AUTO: 96 X10*3/UL (ref 150–450)
PMV BLD AUTO: 8.4 FL (ref 7.5–11.5)
POTASSIUM SERPL-SCNC: 3.8 MMOL/L (ref 3.5–5.3)
PROT UR STRIP.AUTO-MCNC: ABNORMAL MG/DL
RBC # BLD AUTO: 3.06 X10*6/UL (ref 4.5–5.9)
RBC # UR STRIP.AUTO: NEGATIVE /UL
RBC #/AREA URNS AUTO: NORMAL /HPF
SODIUM SERPL-SCNC: 135 MMOL/L (ref 136–145)
SP GR UR STRIP.AUTO: 1.01
UROBILINOGEN UR STRIP.AUTO-MCNC: <2 MG/DL
WBC # BLD AUTO: 3.4 X10*3/UL (ref 4.4–11.3)
WBC #/AREA URNS AUTO: NORMAL /HPF

## 2023-10-04 PROCEDURE — 36415 COLL VENOUS BLD VENIPUNCTURE: CPT | Performed by: NURSE PRACTITIONER

## 2023-10-04 PROCEDURE — 2500000004 HC RX 250 GENERAL PHARMACY W/ HCPCS (ALT 636 FOR OP/ED): Performed by: FAMILY MEDICINE

## 2023-10-04 PROCEDURE — 2500000004 HC RX 250 GENERAL PHARMACY W/ HCPCS (ALT 636 FOR OP/ED): Performed by: NURSE PRACTITIONER

## 2023-10-04 PROCEDURE — 85027 COMPLETE CBC AUTOMATED: CPT | Performed by: NURSE PRACTITIONER

## 2023-10-04 PROCEDURE — 2500000001 HC RX 250 WO HCPCS SELF ADMINISTERED DRUGS (ALT 637 FOR MEDICARE OP): Performed by: FAMILY MEDICINE

## 2023-10-04 PROCEDURE — 2500000002 HC RX 250 W HCPCS SELF ADMINISTERED DRUGS (ALT 637 FOR MEDICARE OP, ALT 636 FOR OP/ED): Performed by: FAMILY MEDICINE

## 2023-10-04 PROCEDURE — 80048 BASIC METABOLIC PNL TOTAL CA: CPT | Performed by: NURSE PRACTITIONER

## 2023-10-04 PROCEDURE — 82947 ASSAY GLUCOSE BLOOD QUANT: CPT

## 2023-10-04 PROCEDURE — 81001 URINALYSIS AUTO W/SCOPE: CPT | Performed by: NURSE PRACTITIONER

## 2023-10-04 RX ORDER — HEPARIN SODIUM 5000 [USP'U]/ML
INJECTION, SOLUTION INTRAVENOUS; SUBCUTANEOUS
Status: DISPENSED
Start: 2023-10-04 | End: 2023-10-05

## 2023-10-04 RX ORDER — INSULIN LISPRO 100 [IU]/ML
INJECTION, SOLUTION INTRAVENOUS; SUBCUTANEOUS
Status: DISPENSED
Start: 2023-10-04 | End: 2023-10-04

## 2023-10-04 RX ORDER — TACROLIMUS 1 MG/1
3 CAPSULE ORAL
Status: DISCONTINUED | OUTPATIENT
Start: 2023-10-04 | End: 2023-10-13 | Stop reason: HOSPADM

## 2023-10-04 RX ORDER — INSULIN LISPRO 100 [IU]/ML
0-5 INJECTION, SOLUTION INTRAVENOUS; SUBCUTANEOUS
Status: DISCONTINUED | OUTPATIENT
Start: 2023-10-05 | End: 2023-10-13 | Stop reason: HOSPADM

## 2023-10-04 RX ORDER — INSULIN GLARGINE 100 [IU]/ML
10 INJECTION, SOLUTION SUBCUTANEOUS NIGHTLY
Status: DISCONTINUED | OUTPATIENT
Start: 2023-10-05 | End: 2023-10-05

## 2023-10-04 RX ADMIN — Medication 50 MCG: at 09:54

## 2023-10-04 RX ADMIN — LOSARTAN POTASSIUM 25 MG: 25 TABLET, FILM COATED ORAL at 09:55

## 2023-10-04 RX ADMIN — FERROUS SULFATE TAB 325 MG (65 MG ELEMENTAL FE) 65 MG OF IRON: 325 (65 FE) TAB at 20:26

## 2023-10-04 RX ADMIN — BENZONATATE 100 MG: 100 CAPSULE ORAL at 15:12

## 2023-10-04 RX ADMIN — HYDRALAZINE HYDROCHLORIDE 25 MG: 25 TABLET ORAL at 09:55

## 2023-10-04 RX ADMIN — DEXTROSE MONOHYDRATE 0.3 G/KG/HR: 100 INJECTION, SOLUTION INTRAVENOUS at 12:59

## 2023-10-04 RX ADMIN — BENZONATATE 100 MG: 100 CAPSULE ORAL at 09:55

## 2023-10-04 RX ADMIN — PANTOPRAZOLE SODIUM 40 MG: 40 TABLET, DELAYED RELEASE ORAL at 06:14

## 2023-10-04 RX ADMIN — FERROUS SULFATE TAB 325 MG (65 MG ELEMENTAL FE) 65 MG OF IRON: 325 (65 FE) TAB at 09:55

## 2023-10-04 RX ADMIN — HYDRALAZINE HYDROCHLORIDE 25 MG: 25 TABLET ORAL at 15:12

## 2023-10-04 RX ADMIN — PREDNISONE 5 MG: 5 TABLET ORAL at 09:55

## 2023-10-04 RX ADMIN — HYDROMORPHONE HYDROCHLORIDE 0.2 MG: 0.2 INJECTION, SOLUTION INTRAMUSCULAR; INTRAVENOUS; SUBCUTANEOUS at 06:14

## 2023-10-04 RX ADMIN — AMLODIPINE BESYLATE 10 MG: 10 TABLET ORAL at 09:56

## 2023-10-04 RX ADMIN — TACROLIMUS 3 MG: 1 CAPSULE ORAL at 17:52

## 2023-10-04 RX ADMIN — CARVEDILOL 25 MG: 25 TABLET, FILM COATED ORAL at 09:55

## 2023-10-04 RX ADMIN — BENZONATATE 100 MG: 100 CAPSULE ORAL at 20:25

## 2023-10-04 RX ADMIN — INSULIN LISPRO 4 UNITS: 100 INJECTION, SOLUTION INTRAVENOUS; SUBCUTANEOUS at 10:03

## 2023-10-04 RX ADMIN — AZATHIOPRINE 50 MG: 50 TABLET ORAL at 09:55

## 2023-10-04 RX ADMIN — HYDRALAZINE HYDROCHLORIDE 25 MG: 25 TABLET ORAL at 20:24

## 2023-10-04 RX ADMIN — PRAVASTATIN SODIUM 40 MG: 40 TABLET ORAL at 20:26

## 2023-10-04 ASSESSMENT — PAIN - FUNCTIONAL ASSESSMENT: PAIN_FUNCTIONAL_ASSESSMENT: VAS (VISUAL ANALOG SCALE)

## 2023-10-04 ASSESSMENT — COGNITIVE AND FUNCTIONAL STATUS - GENERAL
MOBILITY SCORE: 24
MOBILITY SCORE: 24
DAILY ACTIVITIY SCORE: 24
DAILY ACTIVITIY SCORE: 24

## 2023-10-04 ASSESSMENT — PAIN SCALES - PAIN ASSESSMENT IN ADVANCED DEMENTIA (PAINAD)
CONSOLABILITY: DISTRACTED OR REASSURED BY VOICE/TOUCH
TOTALSCORE: 3
BREATHING: NORMAL
BODYLANGUAGE: TENSE, DISTRESSED PACING, FIDGETING
FACIALEXPRESSION: SAD, FRIGHTENED, FROWN

## 2023-10-04 ASSESSMENT — PAIN SCALES - GENERAL
PAINLEVEL_OUTOF10: 10 - WORST POSSIBLE PAIN
PAINLEVEL_OUTOF10: 0 - NO PAIN

## 2023-10-04 ASSESSMENT — PAIN SCALES - WONG BAKER
WONGBAKER_NUMERICALRESPONSE: NO HURT
WONGBAKER_NUMERICALRESPONSE: HURTS WORST

## 2023-10-04 NOTE — CARE PLAN
The patient's goals for the shift include       Patient will verbalize absence of abdominal pain by end of shift.    Over the shift, patient did have one episode of abdominal/back pain. Patient verbalized pain 10/10. PRN dilaudid administered with positive effect.

## 2023-10-04 NOTE — PROGRESS NOTES
"INPATIENT PROGRESS NOTES    PRIMARY SERVICE: Lul Aldana MD       10/4/2023  10:57 AM    INTERVAL HPI: Pt feels so so  Still un well  Abdo pains   Not better or worse w po  Muscle aches     PERTINENT ROS:  REVIEW OF SYSTEMS  GENERAL: negative for fever, SEE HPI  RESPIRATORY: Negative for cough, wheezing or shortness of breath.  CARDIOVASCULAR: Negative for chest pain, leg swelling or palpitations.  GI:   See hpi  NEURO: no change per nursing  All other reviewed and negative other than HPI.      MEDICATIONS:    No current facility-administered medications on file prior to encounter.     Current Outpatient Medications on File Prior to Encounter   Medication Sig Dispense Refill    acetaminophen (Tylenol) 325 mg tablet TAKE 2 TABLETS BY MOUTH EVERY 6 HOURS AS NEEDED FOR MILD TO MODERATE PAIN 112 tablet 0    amLODIPine (Norvasc) 10 mg tablet       azaTHIOprine (Imuran) 50 mg tablet Take 1 tablet (50 mg) by mouth once daily.      Basaglar KwikPen U-100 Insulin 100 unit/mL (3 mL) pen INJECT 15 UNITS SUBCUTANEOUSLY EVERY DAY IN THE MORNING AS DIRECTED 3 mL 10    carvedilol (Coreg) 25 mg tablet Take 1 tablet (25 mg) by mouth 2 times a day.      docusate sodium (Colace) 100 mg capsule TAKE 1 CAPSULE BY MOUTH TWO TIMES A DAY TO PREVENT CONSTIPATION 14 capsule 0    FeroSuL 325 mg (65 mg iron) tablet Take 1 tablet (65 mg of iron) by mouth 2 times a day.      fluocinonide (Lidex) 0.05 % ointment 1 APPLICATION DAILY TOPICALLY USE ON SCALP NIGHTLY (Patient not taking: Reported on 10/3/2023) 90 g 1    hydrALAZINE (Apresoline) 25 mg tablet Take 1 tablet (25 mg) by mouth 3 times a day. 1 Tablet by mouth 3 times daily \"Only if blood pressure is above 150/90\"      insulin lispro (HumaLOG) 100 unit/mL injection INJECT UP TO 5 UNITS SUBCUTANEOUSLY THREE TIMES A DAY WITH MEALS PER SLIDING SCALE AS DIRECTED      ketoconazole (NIZOral) 2 % shampoo 1 APPLICATION DAILY TOPICALLY FOR 1 MONTH. THEN LATHER ON SCALP EVERY FEW DAYS AS NEEDED-LET " SIT FOR 4 MINUTES BEFORE WASHING OFF. (Patient not taking: Reported on 10/3/2023) 120 mL 3    losartan (Cozaar) 25 mg tablet Take 1 tablet (25 mg) by mouth once daily.      pantoprazole (ProtoNix) 40 mg EC tablet Take 1 tablet (40 mg) by mouth once daily in the morning. Take before meals. Do not crush, chew, or split.      permethrin (Elimite) 5 % cream APPLY 1 APPLICATION TOPICALLY DAILY USE ON YOUR SCALP ONCE, LEAVE ON OVERNIGHT, REPEAT ONE WEEK LATER, WASH OFF IN THE MORNING (Patient not taking: Reported on 10/3/2023) 60 g 0    pravastatin (Pravachol) 40 mg tablet Take 1 tablet (40 mg) by mouth once daily at bedtime.      predniSONE (Deltasone) 5 mg tablet Take 1 tablet (5 mg) by mouth once daily in the morning.      tacrolimus (Prograf) 1 mg capsule Take 3 capsule AM 3 Capsule PM      tacrolimus (Protopic) 0.1 % ointment APPLY TO AFFECTED AREA(S) TOPICALLY ONCE DAILY TO AFFECTED ITCHY/ IRRITATED AREAS (Patient not taking: Reported on 10/3/2023) 60 g 1    Vitamin D3 25 mcg (1,000 unit) capsule Take 2 capsules (50 mcg) by mouth once daily.      [DISCONTINUED] acetaminophen (Tylenol) 500 mg tablet TAKE 2 TABLETS BY MOUTH EVERY 8 HOURS AS NEEDED FOR PAIN 30 tablet 0    [DISCONTINUED] benzonatate (Tessalon) 100 mg capsule TAKE 1 CAPSULE BY MOUTH THREE TIMES A DAY. 30 capsule 0    [DISCONTINUED] blood-glucose sensor device CHANGE SENSOR EVERY 10 DAYS AS DIRECTED 3 each 6    [DISCONTINUED] cefdinir (Omnicef) 300 mg capsule       [DISCONTINUED] cinacalcet (Sensipar) 30 mg tablet Take 1 tablet (30 mg) by mouth once daily. Take with food or shortly afer a meal. Swallow tablet whole; do not break or divide.      [DISCONTINUED] clindamycin (Cleocin) 300 mg capsule       [DISCONTINUED] Dexcom G4 platinum  misc USE DEVICE DAILY FOR GLUCOSE TRACKING 1 each 0    [DISCONTINUED] Dexcom G7  misc Use device daily for glucose tracking      [DISCONTINUED] Dexcom G7 Sensor device Every 10 days as directed       "[DISCONTINUED] docusate sodium (Colace) 100 mg capsule       [DISCONTINUED] dulaglutide (Trulicity) 0.75 mg/0.5 mL pen injector INJECT ONE PEN (0.75 MG) UNDER THE SKIN ONCE WEEKLY 2 mL 8    [DISCONTINUED] Easy Touch Alcohol Prep Pads pads, medicated USE AS DIRECTED FOUR TIMES A DAY      [DISCONTINUED] Easy Touch Insulin Syringe 0.5 mL 31 gauge x 5/16\" syringe USE ONCE DAILY WITH INSULIN AS DIRECTED      [DISCONTINUED] fluconazole (Diflucan) 200 mg tablet       [DISCONTINUED] FreeStyle Shiv reader (FreeStyle Shiv 14 Day [place]) misc Inject under the skin. Use as instructed      [DISCONTINUED] FreeStyle Shiv sensor system (FreeStyle Shiv 14 Day Sensor) kit Inject under the skin. Use as instructed. Test every day      [DISCONTINUED] ibuprofen 600 mg tablet Take 1 tablet (600 mg) by mouth 2 times a day as needed for mild pain (1 - 3).      [DISCONTINUED] levoFLOXacin (Levaquin) 750 mg tablet TAKE 1 TABLET BY MOUTH EVERY OTHER DAY 2 tablet 0    [DISCONTINUED] ondansetron ODT (Zofran-ODT) 4 mg disintegrating tablet DISSOLVE 1 TABLET IN MOUTH EVERY 8 HOURS AS NEEDED FOR NAUSEA AND VOMITING 20 tablet 0    [DISCONTINUED] oxyCODONE (Roxicodone) 5 mg immediate release tablet       [DISCONTINUED] oxyCODONE-acetaminophen (Percocet) 5-325 mg tablet TAKE 1 TABLET BY MOUTH EVERY 6 HOURS AS NEEDED FOR MODERATE TO SEVERE PAIN 6 tablet 0    [DISCONTINUED] polyethylene glycol-electrolytes (Nulytely) 420 gram solution PER  ENDOSCOPY INSTRUCTIONS. THIS WILL BE A SPLIT PREP. PT TO TAKE 2L NIGHT BEFORE PROCEDURE AND 2L 5 HOURS BEFORE PROCEDURE 4000 mL 0    [DISCONTINUED] sulfamethoxazole-trimethoprim (Bactrim DS) 800-160 mg tablet       [DISCONTINUED] tacrolimus (Prograf) 1 mg capsule TAKE 2 CAPSULES BY MOUTH TWO TIMES A  capsule 0    [DISCONTINUED] TechLITE Pen Needle 32 gauge x 5/32\" needle       [DISCONTINUED] True Metrix Glucose Test Strip strip USE TO TEST BLOOD SUGAR 3 TIMES DAILY      [DISCONTINUED] Trulicity 0.75 " mg/0.5 mL pen injector Inject 0.75 mg under the skin 1 (one) time per week. As directed      [DISCONTINUED] Unilet Super Thin Lancets 30 gauge misc USE TO TEST BLOOD SUGAR 3 TIMES DAILY           PHYSICAL EXAM:   Vitals:    10/03/23 1900 10/04/23 0005 10/04/23 0500 10/04/23 0830   BP: 164/71 175/79 169/66 178/78   BP Location: Right arm Right arm Right arm Right arm   Patient Position: Lying Lying Lying Lying   Pulse: 70 66 60 65   Resp: 18 18 18 18   Temp: 36.7 °C (98.1 °F) 36.7 °C (98.1 °F) 36.8 °C (98.2 °F) 37.1 °C (98.8 °F)   TempSrc: Oral Oral Temporal Temporal   SpO2: 97% 97% 98% 95%   Weight:       Height:            PHYSICAL EXAMINATION:    General appearance: well-hydrated, well nourished  Skin: skin color, texture, turgor normal,   HEENT: Anicteric sclera.  Oropharynx mucosa moist  Neck: Supple, no adenopathy;   Back: no pain to palpation over spine or costovertebral angles,   Lungs: clear to auscultation, no wheezing or rhonchi  Heart: RRR without murmur, gallop, or rubs.   Abdomen: Abdomen soft, non-tender. Bowel sounds normal. No masses, organomegaly  Extremities: Extremities normal. No  edema, or skin discoloration.   Musculoskeletal: Spine range of motion normal. Muscular strength intact  Neuro: Oriented X  3    DATA: CBC, Coags, BMP, Mg, Phos   Results for orders placed or performed during the hospital encounter of 10/02/23 (from the past 96 hour(s))   CBC and Auto Differential   Result Value Ref Range    WBC 2.8 (L) 4.4 - 11.3 x10*3/uL    nRBC 0.0 0.0 - 0.0 /100 WBCs    RBC 3.07 (L) 4.50 - 5.90 x10*6/uL    Hemoglobin 8.4 (L) 13.5 - 17.5 g/dL    Hematocrit 25.0 (L) 41.0 - 52.0 %    MCV 81 80 - 100 fL    MCH 27.4 26.0 - 34.0 pg    MCHC 33.6 32.0 - 36.0 g/dL    RDW 16.5 (H) 11.5 - 14.5 %    Platelets 104 (L) 150 - 450 x10*3/uL    MPV 9.4 7.5 - 11.5 fL    Neutrophils % 48.9 40.0 - 80.0 %    Immature Granulocytes %, Automated 0.4 0.0 - 0.9 %    Lymphocytes % 30.5 13.0 - 44.0 %    Monocytes % 12.4 2.0 -  10.0 %    Eosinophils % 7.1 0.0 - 6.0 %    Basophils % 0.7 0.0 - 2.0 %    Neutrophils Absolute 1.38 1.20 - 7.70 x10*3/uL    Immature Granulocytes Absolute, Automated 0.01 0.00 - 0.70 x10*3/uL    Lymphocytes Absolute 0.86 (L) 1.20 - 4.80 x10*3/uL    Monocytes Absolute 0.35 0.10 - 1.00 x10*3/uL    Eosinophils Absolute 0.20 0.00 - 0.70 x10*3/uL    Basophils Absolute 0.02 0.00 - 0.10 x10*3/uL   Comprehensive Metabolic Panel   Result Value Ref Range    Glucose 127 (H) 74 - 99 mg/dL    Sodium 132 (L) 136 - 145 mmol/L    Potassium 4.6 3.5 - 5.3 mmol/L    Chloride 102 98 - 107 mmol/L    Bicarbonate 25 21 - 32 mmol/L    Anion Gap 10 10 - 20 mmol/L    Urea Nitrogen 21 6 - 23 mg/dL    Creatinine 2.49 (H) 0.50 - 1.30 mg/dL    eGFR 28 (L) >60 mL/min/1.73m*2    Calcium 9.6 8.6 - 10.3 mg/dL    Albumin 3.3 (L) 3.4 - 5.0 g/dL    Alkaline Phosphatase 48 33 - 136 U/L    Total Protein 5.5 (L) 6.4 - 8.2 g/dL    AST 17 9 - 39 U/L    Bilirubin, Total 0.7 0.0 - 1.2 mg/dL    ALT 9 (L) 10 - 52 U/L   Lipase   Result Value Ref Range    Lipase 5 (L) 9 - 82 U/L   Protime-INR   Result Value Ref Range    Protime 12.8 9.8 - 12.8 seconds    INR 1.1 0.9 - 1.1   POCT GLUCOSE   Result Value Ref Range    POCT Glucose 128 (H) 74 - 99 mg/dL   POCT GLUCOSE   Result Value Ref Range    POCT Glucose 190 (H) 74 - 99 mg/dL   POCT GLUCOSE   Result Value Ref Range    POCT Glucose 161 (H) 74 - 99 mg/dL   POCT GLUCOSE   Result Value Ref Range    POCT Glucose 102 (H) 74 - 99 mg/dL           ASSESSMENT AND PLAN:         Abdominal pain, unclear etiology , CT of abdo no acute process  -input from surgery noted  -adv diet to FLD    Check UA, pending   S/p lap choli surgery following, no acute process    Clears po  Pain control     S/p renal transplant will cont with meds see orders  Dvt ppx        Noted input from surgery       Plan of care discussed with: Provider, RN, Patient + wife .      Patient case and plan of care discussed with Dr. KRISTA Aldana.    Kurtis Tee,  APRN - CNP  -In collaboration with Dr. KRISTA Aldana    [facility]  Office: [phone]  Fax: [phone]   I have reviewed the above note obtained and documented by the NP/PA and I personally participated in the key components. I have discussed the case and management of the patient's care. Changes made to the note, and all key components of history and physical/progress note done by me.  Dw  by bedside  Advance diet   dw nursing  Lul Aldana MD

## 2023-10-05 ENCOUNTER — APPOINTMENT (OUTPATIENT)
Dept: VASCULAR MEDICINE | Facility: HOSPITAL | Age: 68
End: 2023-10-05
Payer: COMMERCIAL

## 2023-10-05 PROBLEM — R10.84 GENERALIZED ABDOMINAL PAIN: Status: ACTIVE | Noted: 2023-10-05

## 2023-10-05 LAB
ALBUMIN SERPL BCP-MCNC: 3.3 G/DL (ref 3.4–5)
ALP SERPL-CCNC: 64 U/L (ref 33–136)
ALT SERPL W P-5'-P-CCNC: 12 U/L (ref 10–52)
ANION GAP SERPL CALC-SCNC: 8 MMOL/L (ref 10–20)
AST SERPL W P-5'-P-CCNC: 21 U/L (ref 9–39)
BILIRUB SERPL-MCNC: 0.6 MG/DL (ref 0–1.2)
BUN SERPL-MCNC: 18 MG/DL (ref 6–23)
CALCIUM SERPL-MCNC: 10.1 MG/DL (ref 8.6–10.3)
CHLORIDE SERPL-SCNC: 103 MMOL/L (ref 98–107)
CO2 SERPL-SCNC: 27 MMOL/L (ref 21–32)
CREAT SERPL-MCNC: 2.55 MG/DL (ref 0.5–1.3)
ERYTHROCYTE [DISTWIDTH] IN BLOOD BY AUTOMATED COUNT: 16.3 % (ref 11.5–14.5)
GFR SERPL CREATININE-BSD FRML MDRD: 27 ML/MIN/1.73M*2
GLUCOSE BLD MANUAL STRIP-MCNC: 159 MG/DL (ref 74–99)
GLUCOSE BLD MANUAL STRIP-MCNC: 175 MG/DL (ref 74–99)
GLUCOSE BLD MANUAL STRIP-MCNC: 194 MG/DL (ref 74–99)
GLUCOSE BLD MANUAL STRIP-MCNC: 202 MG/DL (ref 74–99)
GLUCOSE SERPL-MCNC: 149 MG/DL (ref 74–99)
HCT VFR BLD AUTO: 23.2 % (ref 41–52)
HGB BLD-MCNC: 7.9 G/DL (ref 13.5–17.5)
MCH RBC QN AUTO: 27.7 PG (ref 26–34)
MCHC RBC AUTO-ENTMCNC: 34.1 G/DL (ref 32–36)
MCV RBC AUTO: 81 FL (ref 80–100)
NRBC BLD-RTO: 0 /100 WBCS (ref 0–0)
PLATELET # BLD AUTO: 85 X10*3/UL (ref 150–450)
PMV BLD AUTO: 9.7 FL (ref 7.5–11.5)
POTASSIUM SERPL-SCNC: 4.1 MMOL/L (ref 3.5–5.3)
PROT SERPL-MCNC: 5.9 G/DL (ref 6.4–8.2)
RBC # BLD AUTO: 2.85 X10*6/UL (ref 4.5–5.9)
SODIUM SERPL-SCNC: 134 MMOL/L (ref 136–145)
WBC # BLD AUTO: 2.4 X10*3/UL (ref 4.4–11.3)

## 2023-10-05 PROCEDURE — 36415 COLL VENOUS BLD VENIPUNCTURE: CPT | Performed by: NURSE PRACTITIONER

## 2023-10-05 PROCEDURE — 2500000004 HC RX 250 GENERAL PHARMACY W/ HCPCS (ALT 636 FOR OP/ED): Performed by: NURSE PRACTITIONER

## 2023-10-05 PROCEDURE — 99255 IP/OBS CONSLTJ NEW/EST HI 80: CPT | Performed by: INTERNAL MEDICINE

## 2023-10-05 PROCEDURE — 2500000001 HC RX 250 WO HCPCS SELF ADMINISTERED DRUGS (ALT 637 FOR MEDICARE OP): Performed by: FAMILY MEDICINE

## 2023-10-05 PROCEDURE — 2500000005 HC RX 250 GENERAL PHARMACY W/O HCPCS: Performed by: FAMILY MEDICINE

## 2023-10-05 PROCEDURE — 2500000004 HC RX 250 GENERAL PHARMACY W/ HCPCS (ALT 636 FOR OP/ED): Performed by: FAMILY MEDICINE

## 2023-10-05 PROCEDURE — 1100000001 HC PRIVATE ROOM DAILY

## 2023-10-05 PROCEDURE — 82947 ASSAY GLUCOSE BLOOD QUANT: CPT

## 2023-10-05 PROCEDURE — 93975 VASCULAR STUDY: CPT

## 2023-10-05 PROCEDURE — 2500000002 HC RX 250 W HCPCS SELF ADMINISTERED DRUGS (ALT 637 FOR MEDICARE OP, ALT 636 FOR OP/ED): Performed by: NURSE PRACTITIONER

## 2023-10-05 PROCEDURE — 2500000001 HC RX 250 WO HCPCS SELF ADMINISTERED DRUGS (ALT 637 FOR MEDICARE OP): Performed by: NURSE PRACTITIONER

## 2023-10-05 PROCEDURE — 85027 COMPLETE CBC AUTOMATED: CPT | Performed by: NURSE PRACTITIONER

## 2023-10-05 PROCEDURE — 80053 COMPREHEN METABOLIC PANEL: CPT | Performed by: NURSE PRACTITIONER

## 2023-10-05 PROCEDURE — 93975 VASCULAR STUDY: CPT | Performed by: SURGERY

## 2023-10-05 PROCEDURE — S0119 ONDANSETRON 4 MG: HCPCS | Performed by: FAMILY MEDICINE

## 2023-10-05 RX ORDER — INSULIN GLARGINE 100 [IU]/ML
10 INJECTION, SOLUTION SUBCUTANEOUS DAILY
Status: DISCONTINUED | OUTPATIENT
Start: 2023-10-05 | End: 2023-10-13 | Stop reason: HOSPADM

## 2023-10-05 RX ORDER — GABAPENTIN 100 MG/1
100 CAPSULE ORAL NIGHTLY
Status: DISCONTINUED | OUTPATIENT
Start: 2023-10-05 | End: 2023-10-13 | Stop reason: HOSPADM

## 2023-10-05 RX ADMIN — HYDROMORPHONE HYDROCHLORIDE 0.2 MG: 0.2 INJECTION, SOLUTION INTRAMUSCULAR; INTRAVENOUS; SUBCUTANEOUS at 01:29

## 2023-10-05 RX ADMIN — PANTOPRAZOLE SODIUM 40 MG: 40 TABLET, DELAYED RELEASE ORAL at 06:38

## 2023-10-05 RX ADMIN — HYDROMORPHONE HYDROCHLORIDE 0.2 MG: 0.2 INJECTION, SOLUTION INTRAMUSCULAR; INTRAVENOUS; SUBCUTANEOUS at 12:36

## 2023-10-05 RX ADMIN — INSULIN LISPRO 1 UNITS: 100 INJECTION, SOLUTION INTRAVENOUS; SUBCUTANEOUS at 09:14

## 2023-10-05 RX ADMIN — HYDRALAZINE HYDROCHLORIDE 25 MG: 25 TABLET ORAL at 21:19

## 2023-10-05 RX ADMIN — GABAPENTIN 100 MG: 100 CAPSULE ORAL at 21:18

## 2023-10-05 RX ADMIN — INSULIN LISPRO 1 UNITS: 100 INJECTION, SOLUTION INTRAVENOUS; SUBCUTANEOUS at 17:22

## 2023-10-05 RX ADMIN — INSULIN GLARGINE 10 UNITS: 100 INJECTION, SOLUTION SUBCUTANEOUS at 11:41

## 2023-10-05 RX ADMIN — FERROUS SULFATE TAB 325 MG (65 MG ELEMENTAL FE) 65 MG OF IRON: 325 (65 FE) TAB at 09:06

## 2023-10-05 RX ADMIN — HYDRALAZINE HYDROCHLORIDE 25 MG: 25 TABLET ORAL at 15:07

## 2023-10-05 RX ADMIN — PRAVASTATIN SODIUM 40 MG: 40 TABLET ORAL at 21:19

## 2023-10-05 RX ADMIN — TACROLIMUS 3 MG: 1 CAPSULE ORAL at 17:51

## 2023-10-05 RX ADMIN — BENZONATATE 100 MG: 100 CAPSULE ORAL at 21:18

## 2023-10-05 RX ADMIN — BENZONATATE 100 MG: 100 CAPSULE ORAL at 15:07

## 2023-10-05 RX ADMIN — AMLODIPINE BESYLATE 10 MG: 10 TABLET ORAL at 09:07

## 2023-10-05 RX ADMIN — TACROLIMUS 3 MG: 1 CAPSULE ORAL at 06:38

## 2023-10-05 RX ADMIN — CARVEDILOL 25 MG: 25 TABLET, FILM COATED ORAL at 09:08

## 2023-10-05 RX ADMIN — INSULIN LISPRO 1 UNITS: 100 INJECTION, SOLUTION INTRAVENOUS; SUBCUTANEOUS at 13:37

## 2023-10-05 RX ADMIN — PREDNISONE 5 MG: 5 TABLET ORAL at 09:07

## 2023-10-05 RX ADMIN — ONDANSETRON 4 MG: 4 TABLET, ORALLY DISINTEGRATING ORAL at 15:13

## 2023-10-05 RX ADMIN — HYDROMORPHONE HYDROCHLORIDE 0.2 MG: 0.2 INJECTION, SOLUTION INTRAMUSCULAR; INTRAVENOUS; SUBCUTANEOUS at 19:13

## 2023-10-05 RX ADMIN — BENZONATATE 100 MG: 100 CAPSULE ORAL at 09:08

## 2023-10-05 RX ADMIN — LOSARTAN POTASSIUM 25 MG: 25 TABLET, FILM COATED ORAL at 09:07

## 2023-10-05 RX ADMIN — HYDROMORPHONE HYDROCHLORIDE 0.2 MG: 0.2 INJECTION, SOLUTION INTRAMUSCULAR; INTRAVENOUS; SUBCUTANEOUS at 15:13

## 2023-10-05 RX ADMIN — AZATHIOPRINE 50 MG: 50 TABLET ORAL at 09:07

## 2023-10-05 RX ADMIN — HYDRALAZINE HYDROCHLORIDE 25 MG: 25 TABLET ORAL at 09:06

## 2023-10-05 RX ADMIN — HYDROMORPHONE HYDROCHLORIDE 0.2 MG: 0.2 INJECTION, SOLUTION INTRAMUSCULAR; INTRAVENOUS; SUBCUTANEOUS at 09:34

## 2023-10-05 RX ADMIN — FERROUS SULFATE TAB 325 MG (65 MG ELEMENTAL FE) 65 MG OF IRON: 325 (65 FE) TAB at 21:20

## 2023-10-05 RX ADMIN — Medication 50 MCG: at 09:07

## 2023-10-05 ASSESSMENT — COGNITIVE AND FUNCTIONAL STATUS - GENERAL
MOBILITY SCORE: 24
DAILY ACTIVITIY SCORE: 24
MOBILITY SCORE: 24
MOBILITY SCORE: 24

## 2023-10-05 ASSESSMENT — ENCOUNTER SYMPTOMS
BACK PAIN: 1
VOMITING: 1
NEUROLOGICAL NEGATIVE: 1
NAUSEA: 1
ABDOMINAL PAIN: 1
CONSTITUTIONAL NEGATIVE: 1
PSYCHIATRIC NEGATIVE: 1
RESPIRATORY NEGATIVE: 1
EYES NEGATIVE: 1
CARDIOVASCULAR NEGATIVE: 1

## 2023-10-05 ASSESSMENT — PAIN SCALES - GENERAL
PAINLEVEL_OUTOF10: 8
PAINLEVEL_OUTOF10: 8
PAINLEVEL_OUTOF10: 7
PAINLEVEL_OUTOF10: 10 - WORST POSSIBLE PAIN
PAINLEVEL_OUTOF10: 10 - WORST POSSIBLE PAIN

## 2023-10-05 ASSESSMENT — PAIN - FUNCTIONAL ASSESSMENT
PAIN_FUNCTIONAL_ASSESSMENT: 0-10
PAIN_FUNCTIONAL_ASSESSMENT: 0-10

## 2023-10-05 NOTE — CONSULTS
Reason For Consult  Abdominal pain    History Of Present Illness  Manolo Bashir is a 68 y.o. male presenting with c/o abdominal pain. Pt had lap cholecystectomy on 9/24/23, he did well after surgery and was discharged home. He presented back with c/o pain which is now located in his back and lowe abdomen, constant, associated with nausea and vomiting. Stated he can't lay down, sit or stand due to pain. CT scan that shows postsurgical changes. He had MRI of the spin in 909/23 showing no evidence and metatstic disease, no evidence if diskitis, degenerative changes noted, see results below.  Pt with h/o ESRD, s/p kidney transplant, CKD, angina, HTN, DM, prostate cancer, s/p radical prostatectomy, history of HCV and was treated in 2019,  pneumonia, acute kidney injury.  Last colonoscopy 8/28/23 showing The examined portion of the ileum was normal; patent side-to-side ileo-colonic anastomosis, Diverticulosis in the sigmoid colon, in the descending colon and in the transverse colon, no evidence of diverticular bleeding; non-bleeding external hemorrhoids. Colonoscopy  4/24/17 revealed an ileo-colonic anastomosis in the transverse colon, diverticulosis in the sigmoid colon, descending colon, and transverse colon, and external hemorrhoids. He has history of adenomatous polyps on colonoscopy > 5 years ago.      Past Medical History  He has a past medical history of Chronic kidney disease, stage 3 unspecified (CMS/HCC) (09/26/2018), COVID-19 (06/18/2020), Other long term (current) drug therapy (07/20/2021), Personal history of other diseases of the circulatory system, Personal history of other infectious and parasitic diseases (08/17/2015), and Unspecified kidney failure (08/17/2016).    Surgical History  He has a past surgical history that includes Prostatectomy (10/11/2013); Ileostomy (04/25/2017); Other surgical history (04/21/2017); Ileostomy closure (08/17/2015); Other surgical history (08/17/2015); Other surgical  history (08/17/2015); Other surgical history (08/17/2015); and US guided percutaneous peritoneal or retroperitoneal fluid collection drainage (10/20/2022).  s/p AUS x2, removal and replacement of most recent AUS on 2/17/23, s/p AUS removal with Dr. Jc on 3/28/23.     Social History  He reports that he has never smoked. He has never used smokeless tobacco. He reports current alcohol use. He reports current drug use. Drug: Oxycodone.    Family History  Family History   Problem Relation Name Age of Onset    Bone cancer Mother      Other (corona's sarcome of the bone marrow) Mother      Prostate cancer Father      Diabetes Other Family Hist     Hypertension Other Family Hist         Allergies  Patient has no known allergies.    Review of Systems  Review of Systems   Constitutional: Negative.    HENT: Negative.     Eyes: Negative.    Respiratory: Negative.     Cardiovascular: Negative.    Gastrointestinal:  Positive for abdominal pain, nausea and vomiting.   Genitourinary: Negative.    Musculoskeletal:  Positive for back pain.   Skin: Negative.    Neurological: Negative.    Psychiatric/Behavioral: Negative.            Physical Exam  Physical Exam  Constitutional:       General: He is in acute distress.      Comments: Secondary to pain   HENT:      Head: Normocephalic and atraumatic.      Nose: Nose normal.      Mouth/Throat:      Mouth: Mucous membranes are moist.   Eyes:      Conjunctiva/sclera: Conjunctivae normal.   Cardiovascular:      Rate and Rhythm: Normal rate and regular rhythm.      Heart sounds: Normal heart sounds.   Pulmonary:      Effort: Pulmonary effort is normal.      Breath sounds: Normal breath sounds.   Abdominal:      General: Abdomen is flat. A surgical scar is present.      Palpations: Abdomen is soft.      Tenderness: There is abdominal tenderness in the right upper quadrant and right lower quadrant.      Hernia: A hernia is present. Hernia is present in the umbilical area.  "  Musculoskeletal:      Cervical back: Normal and neck supple.      Thoracic back: No tenderness.      Lumbar back: Tenderness present.        Back:    Skin:     General: Skin is warm and dry.   Neurological:      General: No focal deficit present.      Mental Status: He is alert and oriented to person, place, and time.   Psychiatric:         Attention and Perception: Attention normal.         Mood and Affect: Mood is anxious.         Speech: Speech normal.            Last Recorded Vitals  Blood pressure 179/63, pulse 77, temperature 36.9 °C (98.4 °F), temperature source Oral, resp. rate 17, height 1.727 m (5' 8\"), weight 72.6 kg (160 lb), SpO2 100 %.    Relevant Results  No current facility-administered medications on file prior to encounter.     Current Outpatient Medications on File Prior to Encounter   Medication Sig Dispense Refill    acetaminophen (Tylenol) 325 mg tablet TAKE 2 TABLETS BY MOUTH EVERY 6 HOURS AS NEEDED FOR MILD TO MODERATE PAIN 112 tablet 0    amLODIPine (Norvasc) 10 mg tablet       azaTHIOprine (Imuran) 50 mg tablet Take 1 tablet (50 mg) by mouth once daily.      Basaglar KwikPen U-100 Insulin 100 unit/mL (3 mL) pen INJECT 15 UNITS SUBCUTANEOUSLY EVERY DAY IN THE MORNING AS DIRECTED 3 mL 10    carvedilol (Coreg) 25 mg tablet Take 1 tablet (25 mg) by mouth 2 times a day.      docusate sodium (Colace) 100 mg capsule TAKE 1 CAPSULE BY MOUTH TWO TIMES A DAY TO PREVENT CONSTIPATION 14 capsule 0    FeroSuL 325 mg (65 mg iron) tablet Take 1 tablet (65 mg of iron) by mouth 2 times a day.      fluocinonide (Lidex) 0.05 % ointment 1 APPLICATION DAILY TOPICALLY USE ON SCALP NIGHTLY (Patient not taking: Reported on 10/3/2023) 90 g 1    hydrALAZINE (Apresoline) 25 mg tablet Take 1 tablet (25 mg) by mouth 3 times a day. 1 Tablet by mouth 3 times daily \"Only if blood pressure is above 150/90\"      insulin lispro (HumaLOG) 100 unit/mL injection INJECT UP TO 5 UNITS SUBCUTANEOUSLY THREE TIMES A DAY WITH MEALS " PER SLIDING SCALE AS DIRECTED      ketoconazole (NIZOral) 2 % shampoo 1 APPLICATION DAILY TOPICALLY FOR 1 MONTH. THEN LATHER ON SCALP EVERY FEW DAYS AS NEEDED-LET SIT FOR 4 MINUTES BEFORE WASHING OFF. (Patient not taking: Reported on 10/3/2023) 120 mL 3    losartan (Cozaar) 25 mg tablet Take 1 tablet (25 mg) by mouth once daily.      pantoprazole (ProtoNix) 40 mg EC tablet Take 1 tablet (40 mg) by mouth once daily in the morning. Take before meals. Do not crush, chew, or split.      permethrin (Elimite) 5 % cream APPLY 1 APPLICATION TOPICALLY DAILY USE ON YOUR SCALP ONCE, LEAVE ON OVERNIGHT, REPEAT ONE WEEK LATER, WASH OFF IN THE MORNING (Patient not taking: Reported on 10/3/2023) 60 g 0    pravastatin (Pravachol) 40 mg tablet Take 1 tablet (40 mg) by mouth once daily at bedtime.      predniSONE (Deltasone) 5 mg tablet Take 1 tablet (5 mg) by mouth once daily in the morning.      tacrolimus (Prograf) 1 mg capsule Take 3 capsule AM 3 Capsule PM      tacrolimus (Protopic) 0.1 % ointment APPLY TO AFFECTED AREA(S) TOPICALLY ONCE DAILY TO AFFECTED ITCHY/ IRRITATED AREAS (Patient not taking: Reported on 10/3/2023) 60 g 1    Vitamin D3 25 mcg (1,000 unit) capsule Take 2 capsules (50 mcg) by mouth once daily.      [DISCONTINUED] acetaminophen (Tylenol) 500 mg tablet TAKE 2 TABLETS BY MOUTH EVERY 8 HOURS AS NEEDED FOR PAIN 30 tablet 0    [DISCONTINUED] benzonatate (Tessalon) 100 mg capsule TAKE 1 CAPSULE BY MOUTH THREE TIMES A DAY. 30 capsule 0    [DISCONTINUED] blood-glucose sensor device CHANGE SENSOR EVERY 10 DAYS AS DIRECTED 3 each 6    [DISCONTINUED] cefdinir (Omnicef) 300 mg capsule       [DISCONTINUED] cinacalcet (Sensipar) 30 mg tablet Take 1 tablet (30 mg) by mouth once daily. Take with food or shortly afer a meal. Swallow tablet whole; do not break or divide.      [DISCONTINUED] clindamycin (Cleocin) 300 mg capsule       [DISCONTINUED] Dexcom G4 platinum  misc USE DEVICE DAILY FOR GLUCOSE TRACKING 1 each 0  "   [DISCONTINUED] Dexcom G7  misc Use device daily for glucose tracking      [DISCONTINUED] Dexcom G7 Sensor device Every 10 days as directed      [DISCONTINUED] docusate sodium (Colace) 100 mg capsule       [DISCONTINUED] dulaglutide (Trulicity) 0.75 mg/0.5 mL pen injector INJECT ONE PEN (0.75 MG) UNDER THE SKIN ONCE WEEKLY 2 mL 8    [DISCONTINUED] Easy Touch Alcohol Prep Pads pads, medicated USE AS DIRECTED FOUR TIMES A DAY      [DISCONTINUED] Easy Touch Insulin Syringe 0.5 mL 31 gauge x 5/16\" syringe USE ONCE DAILY WITH INSULIN AS DIRECTED      [DISCONTINUED] fluconazole (Diflucan) 200 mg tablet       [DISCONTINUED] FreeStyle Shiv reader (FreeStyle Shiv 14 Day [place]) misc Inject under the skin. Use as instructed      [DISCONTINUED] FreeStyle Shiv sensor system (FreeStyle Shiv 14 Day Sensor) kit Inject under the skin. Use as instructed. Test every day      [DISCONTINUED] ibuprofen 600 mg tablet Take 1 tablet (600 mg) by mouth 2 times a day as needed for mild pain (1 - 3).      [DISCONTINUED] levoFLOXacin (Levaquin) 750 mg tablet TAKE 1 TABLET BY MOUTH EVERY OTHER DAY 2 tablet 0    [DISCONTINUED] ondansetron ODT (Zofran-ODT) 4 mg disintegrating tablet DISSOLVE 1 TABLET IN MOUTH EVERY 8 HOURS AS NEEDED FOR NAUSEA AND VOMITING 20 tablet 0    [DISCONTINUED] oxyCODONE (Roxicodone) 5 mg immediate release tablet       [DISCONTINUED] oxyCODONE-acetaminophen (Percocet) 5-325 mg tablet TAKE 1 TABLET BY MOUTH EVERY 6 HOURS AS NEEDED FOR MODERATE TO SEVERE PAIN 6 tablet 0    [DISCONTINUED] polyethylene glycol-electrolytes (Nulytely) 420 gram solution PER  ENDOSCOPY INSTRUCTIONS. THIS WILL BE A SPLIT PREP. PT TO TAKE 2L NIGHT BEFORE PROCEDURE AND 2L 5 HOURS BEFORE PROCEDURE 4000 mL 0    [DISCONTINUED] sulfamethoxazole-trimethoprim (Bactrim DS) 800-160 mg tablet       [DISCONTINUED] tacrolimus (Prograf) 1 mg capsule TAKE 2 CAPSULES BY MOUTH TWO TIMES A  capsule 0    [DISCONTINUED] TechLITE Pen Needle 32 " "gauge x 5/32\" needle       [DISCONTINUED] True Metrix Glucose Test Strip strip USE TO TEST BLOOD SUGAR 3 TIMES DAILY      [DISCONTINUED] Trulicity 0.75 mg/0.5 mL pen injector Inject 0.75 mg under the skin 1 (one) time per week. As directed      [DISCONTINUED] Unilet Super Thin Lancets 30 gauge misc USE TO TEST BLOOD SUGAR 3 TIMES DAILY     Scheduled medications  amLODIPine, 10 mg, oral, Daily  azaTHIOprine, 50 mg, oral, Daily  benzonatate, 100 mg, oral, TID  carvedilol, 25 mg, oral, BID  cholecalciferol, 50 mcg, oral, Daily  ferrous sulfate, 65 mg of iron, oral, BID  gabapentin, 100 mg, oral, Nightly  heparin, 5,000 Units, subcutaneous, q8h  hydrALAZINE, 25 mg, oral, TID  insulin glargine, 10 Units, subcutaneous, Daily  insulin lispro, 0-5 Units, subcutaneous, TID with meals  losartan, 25 mg, oral, Daily  pantoprazole, 40 mg, oral, Daily before breakfast  pravastatin, 40 mg, oral, Nightly  predniSONE, 5 mg, oral, q AM  tacrolimus, 3 mg, oral, q12h ZOË      Continuous medications     PRN medications  PRN medications: acetaminophen, dextrose, docusate sodium, glucagon, HYDROmorphone, ondansetron ODT  Results for orders placed or performed during the hospital encounter of 10/02/23 (from the past 96 hour(s))   CBC and Auto Differential   Result Value Ref Range    WBC 2.8 (L) 4.4 - 11.3 x10*3/uL    nRBC 0.0 0.0 - 0.0 /100 WBCs    RBC 3.07 (L) 4.50 - 5.90 x10*6/uL    Hemoglobin 8.4 (L) 13.5 - 17.5 g/dL    Hematocrit 25.0 (L) 41.0 - 52.0 %    MCV 81 80 - 100 fL    MCH 27.4 26.0 - 34.0 pg    MCHC 33.6 32.0 - 36.0 g/dL    RDW 16.5 (H) 11.5 - 14.5 %    Platelets 104 (L) 150 - 450 x10*3/uL    MPV 9.4 7.5 - 11.5 fL    Neutrophils % 48.9 40.0 - 80.0 %    Immature Granulocytes %, Automated 0.4 0.0 - 0.9 %    Lymphocytes % 30.5 13.0 - 44.0 %    Monocytes % 12.4 2.0 - 10.0 %    Eosinophils % 7.1 0.0 - 6.0 %    Basophils % 0.7 0.0 - 2.0 %    Neutrophils Absolute 1.38 1.20 - 7.70 x10*3/uL    Immature Granulocytes Absolute, Automated " 0.01 0.00 - 0.70 x10*3/uL    Lymphocytes Absolute 0.86 (L) 1.20 - 4.80 x10*3/uL    Monocytes Absolute 0.35 0.10 - 1.00 x10*3/uL    Eosinophils Absolute 0.20 0.00 - 0.70 x10*3/uL    Basophils Absolute 0.02 0.00 - 0.10 x10*3/uL   Comprehensive Metabolic Panel   Result Value Ref Range    Glucose 127 (H) 74 - 99 mg/dL    Sodium 132 (L) 136 - 145 mmol/L    Potassium 4.6 3.5 - 5.3 mmol/L    Chloride 102 98 - 107 mmol/L    Bicarbonate 25 21 - 32 mmol/L    Anion Gap 10 10 - 20 mmol/L    Urea Nitrogen 21 6 - 23 mg/dL    Creatinine 2.49 (H) 0.50 - 1.30 mg/dL    eGFR 28 (L) >60 mL/min/1.73m*2    Calcium 9.6 8.6 - 10.3 mg/dL    Albumin 3.3 (L) 3.4 - 5.0 g/dL    Alkaline Phosphatase 48 33 - 136 U/L    Total Protein 5.5 (L) 6.4 - 8.2 g/dL    AST 17 9 - 39 U/L    Bilirubin, Total 0.7 0.0 - 1.2 mg/dL    ALT 9 (L) 10 - 52 U/L   Lipase   Result Value Ref Range    Lipase 5 (L) 9 - 82 U/L   Protime-INR   Result Value Ref Range    Protime 12.8 9.8 - 12.8 seconds    INR 1.1 0.9 - 1.1   POCT GLUCOSE   Result Value Ref Range    POCT Glucose 128 (H) 74 - 99 mg/dL   POCT GLUCOSE   Result Value Ref Range    POCT Glucose 190 (H) 74 - 99 mg/dL   POCT GLUCOSE   Result Value Ref Range    POCT Glucose 161 (H) 74 - 99 mg/dL   POCT GLUCOSE   Result Value Ref Range    POCT Glucose 102 (H) 74 - 99 mg/dL   CBC   Result Value Ref Range    WBC 3.4 (L) 4.4 - 11.3 x10*3/uL    nRBC 0.0 0.0 - 0.0 /100 WBCs    RBC 3.06 (L) 4.50 - 5.90 x10*6/uL    Hemoglobin 8.4 (L) 13.5 - 17.5 g/dL    Hematocrit 25.0 (L) 41.0 - 52.0 %    MCV 82 80 - 100 fL    MCH 27.5 26.0 - 34.0 pg    MCHC 33.6 32.0 - 36.0 g/dL    RDW 16.3 (H) 11.5 - 14.5 %    Platelets 96 (L) 150 - 450 x10*3/uL    MPV 8.4 7.5 - 11.5 fL   Basic Metabolic Panel   Result Value Ref Range    Glucose 48 (LL) 74 - 99 mg/dL    Sodium 135 (L) 136 - 145 mmol/L    Potassium 3.8 3.5 - 5.3 mmol/L    Chloride 105 98 - 107 mmol/L    Bicarbonate 26 21 - 32 mmol/L    Anion Gap 8 (L) 10 - 20 mmol/L    Urea Nitrogen 20 6 - 23  mg/dL    Creatinine 2.43 (H) 0.50 - 1.30 mg/dL    eGFR 28 (L) >60 mL/min/1.73m*2    Calcium 9.9 8.6 - 10.3 mg/dL   POCT GLUCOSE   Result Value Ref Range    POCT Glucose 61 (L) 74 - 99 mg/dL   POCT GLUCOSE   Result Value Ref Range    POCT Glucose 51 (L) 74 - 99 mg/dL   POCT GLUCOSE   Result Value Ref Range    POCT Glucose 95 74 - 99 mg/dL   POCT GLUCOSE   Result Value Ref Range    POCT Glucose 119 (H) 74 - 99 mg/dL   POCT GLUCOSE   Result Value Ref Range    POCT Glucose 161 (H) 74 - 99 mg/dL   Urinalysis with Reflex Microscopic   Result Value Ref Range    Color, Urine Yellow Straw, Yellow    Appearance, Urine Clear Clear    Specific Gravity, Urine 1.009 1.005 - 1.035    pH, Urine 7.0 5.0, 5.5, 6.0, 6.5, 7.0, 7.5, 8.0    Protein, Urine >=500 (3+) (N) NEGATIVE mg/dL    Glucose, Urine NEGATIVE NEGATIVE mg/dL    Blood, Urine NEGATIVE NEGATIVE    Ketones, Urine NEGATIVE NEGATIVE mg/dL    Bilirubin, Urine NEGATIVE NEGATIVE    Urobilinogen, Urine <2.0 <2.0 mg/dL    Nitrite, Urine NEGATIVE NEGATIVE    Leukocyte Esterase, Urine NEGATIVE NEGATIVE   Urinalysis Microscopic Only   Result Value Ref Range    WBC, Urine 1-5 1-5, NONE /HPF    RBC, Urine 1-2 NONE, 1-2, 3-5 /HPF   POCT GLUCOSE   Result Value Ref Range    POCT Glucose 211 (H) 74 - 99 mg/dL   Comprehensive metabolic panel   Result Value Ref Range    Glucose 149 (H) 74 - 99 mg/dL    Sodium 134 (L) 136 - 145 mmol/L    Potassium 4.1 3.5 - 5.3 mmol/L    Chloride 103 98 - 107 mmol/L    Bicarbonate 27 21 - 32 mmol/L    Anion Gap 8 (L) 10 - 20 mmol/L    Urea Nitrogen 18 6 - 23 mg/dL    Creatinine 2.55 (H) 0.50 - 1.30 mg/dL    eGFR 27 (L) >60 mL/min/1.73m*2    Calcium 10.1 8.6 - 10.3 mg/dL    Albumin 3.3 (L) 3.4 - 5.0 g/dL    Alkaline Phosphatase 64 33 - 136 U/L    Total Protein 5.9 (L) 6.4 - 8.2 g/dL    AST 21 9 - 39 U/L    Bilirubin, Total 0.6 0.0 - 1.2 mg/dL    ALT 12 10 - 52 U/L   POCT GLUCOSE   Result Value Ref Range    POCT Glucose 175 (H) 74 - 99 mg/dL     CT abdomen  pelvis wo IV contrast    Result Date: 10/2/2023    1. Postoperative changes, as above. 2. No evidence of bowel obstruction, free intraperitoneal air or abnormal intra-abdominal fluid collection. 3. Bilateral pleural effusions and basilar airspace consolidations, as above.   MACRO: None   Signed by: Constantino Jin 10/2/2023 11:46 AM Dictation workstation:   [id number]    XR chest 1 view    Result Date: 9/23/2023   FINDINGS: Mild diffuse interstitial infiltrates are seen bilaterally, and may represent edema and/or pneumonia. No pneumothorax is identified. The cardiac silhouette is within normal limits for size.      Diffuse interstitial infiltrates bilaterally, as above. Clinical correlation and continued follow-up until clearing is recommended.  MACRO: None.    NM liver spleen    Result Date: 9/22/2023  1. This study demonstrates patency of cystic duct and common bile duct, no evidence of acute cholecystitis. 2. No contraction of the gallbladder after CCK administration, suggestive of biliary dyskinesia.  I personally reviewed the images/study. This study was interpreted at [place], [place].    MR lumbar spine w and wo IV contrast Date: 9/19/2023  1. Nonspecific focus of abnormal signal in the right-sided pedicle of L4 can not be further characterized but is consistent with the presence of osseous hemangioma, stress fracture or osteoid osteoma. No evidence of discitis or osteomyelitis. 2. Bone island in the S2 vertebral body 3. No evidence of metastatic disease to the lumbar spine. 4. Mild-to-moderate multilevel degenerative change of the lumbar spine with ligamentum flavum hypertrophy, facet arthropathy, and mild neural foraminal stenosis at L3-L4.  I personally reviewed the images/study, and I agree with the findings as stated above. This study was interpreted at [place], [place].    CT lumbar spine wo IV contrast Date:  9/17/2023  1.  No evidence of acute trauma or high-grade stenosis in the lumbar spine. 2. Multilevel degenerative changes of the lumbar spine, with likely mild spinal canal narrowing present at the level of L4-L5 and mild-to-moderate neural foraminal stenosis present at the levels of L3-L4 due to combination of bulging disc, endplate spurring and hypertrophic facet changes.  MACRO: None           Assessment/Plan     Abdominal pain, unclear etiology, possibly musculoskeletal pain, other differentials include acute mesenteric ischemic, pain secondary to adhesions, postoperative pain    Pancytopenia, unclear etiology    - will check US of the mesenteric vasculature  - supportive care   - diet as tolerated  - consider hematology consultation for new pancytopenia  - if negative US, consider repeating CT scan       I spent 60 minutes in the professional and overall care of this patient.      Miesha Romero, APRN-CNP

## 2023-10-05 NOTE — CARE PLAN
The patient's goals for the shift include      The clinical goals for the shift include get better control over pain    Problem: Pain - Adult  Goal: Verbalizes/displays adequate comfort level or baseline comfort level  10/5/2023 1920 by Marva Tafoya RN  Outcome: Progressing  10/5/2023 1813 by Marva Tafoya RN  Flowsheets (Taken 10/5/2023 1813)  Verbalizes/displays adequate comfort level or baseline comfort level:   Encourage patient to monitor pain and request assistance   Assess pain using appropriate pain scale   Administer analgesics based on type and severity of pain and evaluate response  10/5/2023 1812 by Marva Tafoya RN  Outcome: Progressing     Problem: Safety - Adult  Goal: Free from fall injury  10/5/2023 1920 by Marva Tafoya RN  Outcome: Progressing  10/5/2023 1812 by Marva Tafoya RN  Outcome: Progressing     Problem: Discharge Planning  Goal: Discharge to home or other facility with appropriate resources  10/5/2023 1920 by Marva Tafoya RN  Outcome: Progressing  10/5/2023 1812 by Marva Tafoya RN  Outcome: Progressing     Problem: Chronic Conditions and Co-morbidities  Goal: Patient's chronic conditions and co-morbidity symptoms are monitored and maintained or improved  10/5/2023 1920 by Marva Tafoya RN  Outcome: Progressing  10/5/2023 1812 by Marva Tafoya RN  Outcome: Progressing     Problem: Diabetes  Goal: Achieve decreasing blood glucose levels by end of shift  10/5/2023 1920 by Marva Tafoya RN  Outcome: Progressing  10/5/2023 1812 by Marva Tafoya RN  Outcome: Progressing  Goal: Increase stability of blood glucose readings by end of shift  10/5/2023 1920 by Marva Tafoya RN  Outcome: Progressing  10/5/2023 1812 by Marva Tafoya RN  Outcome: Progressing  Goal: Decrease in ketones present in urine by end of shift  10/5/2023 1920 by Marva Tafoya RN  Outcome: Progressing  10/5/2023 1812 by Marva Tafoya RN  Outcome: Progressing  Goal: Maintain electrolyte levels within acceptable  range throughout shift  10/5/2023 1920 by Marva Tafoya RN  Outcome: Progressing  10/5/2023 1812 by Marva Tafoya RN  Outcome: Progressing  Goal: Maintain glucose levels >70mg/dl to <250mg/dl throughout shift  10/5/2023 1920 by Marva Tafoya RN  Outcome: Progressing  10/5/2023 1812 by Marva Tafoya RN  Outcome: Progressing  Goal: No changes in neurological exam by end of shift  10/5/2023 1920 by Marva Tafoya RN  Outcome: Progressing  10/5/2023 1812 by Marva Tafoya RN  Outcome: Progressing  Goal: Learn about and adhere to nutrition recommendations by end of shift  10/5/2023 1920 by Marva Tafoya RN  Outcome: Progressing  10/5/2023 1812 by Marva Tafoya RN  Outcome: Progressing  Goal: Vital signs within normal range for age by end of shift  10/5/2023 1920 by Marva Tafoya RN  Outcome: Progressing  10/5/2023 1812 by Marva Tafoya RN  Outcome: Progressing  Goal: Increase self care and/or family involovement by end of shift  10/5/2023 1920 by Marva Tafoya RN  Outcome: Progressing  10/5/2023 1812 by Marva Tafoya RN  Outcome: Progressing  Goal: Receive DSME education by end of shift  10/5/2023 1920 by Marva Tafoya RN  Outcome: Progressing  10/5/2023 1812 by Marva Tafoya RN  Outcome: Progressing

## 2023-10-05 NOTE — PROGRESS NOTES
Patient Not Medically Ready for Discharge today.  POD # 11 s/p Laparoscopic Cholecystectomy. PMH: Renal Transplant on immunosuppressive medications. General Surgery following. Diet advanced to clear liquids as tolerated. No skilled need identified at this time.   Umm GHOSH RN [facility]

## 2023-10-05 NOTE — CARE PLAN
The patient's goals for the shift include  remain free from pain through shift     The clinical goals for the shift include Patient will verbalize absence of abdominal pain by end of shift.    Over the shift, the patient did not make progress toward the following goals. Barriers to progression include NA. Recommendations to address these barriers include NA.

## 2023-10-05 NOTE — PROGRESS NOTES
"  INPATIENT PROGRESS NOTES    PRIMARY SERVICE: Lul Aldana MD       10/5/2023  7:55 AM    INTERVAL HPI: Pt feels so so  Abdo pains persist  Not better or worse w po intake  Muscle aches     Glucose noted  D.w pt    PERTINENT ROS:  REVIEW OF SYSTEMS  GENERAL: negative for fever, SEE HPI  RESPIRATORY: Negative for cough, wheezing or shortness of breath.  CARDIOVASCULAR: Negative for chest pain, leg swelling or palpitations.  GI:   See hpi  NEURO: no change per nursing  All other reviewed and negative other than HPI.      MEDICATIONS:    No current facility-administered medications on file prior to encounter.     Current Outpatient Medications on File Prior to Encounter   Medication Sig Dispense Refill    acetaminophen (Tylenol) 325 mg tablet TAKE 2 TABLETS BY MOUTH EVERY 6 HOURS AS NEEDED FOR MILD TO MODERATE PAIN 112 tablet 0    amLODIPine (Norvasc) 10 mg tablet       azaTHIOprine (Imuran) 50 mg tablet Take 1 tablet (50 mg) by mouth once daily.      Basaglar KwikPen U-100 Insulin 100 unit/mL (3 mL) pen INJECT 15 UNITS SUBCUTANEOUSLY EVERY DAY IN THE MORNING AS DIRECTED 3 mL 10    carvedilol (Coreg) 25 mg tablet Take 1 tablet (25 mg) by mouth 2 times a day.      docusate sodium (Colace) 100 mg capsule TAKE 1 CAPSULE BY MOUTH TWO TIMES A DAY TO PREVENT CONSTIPATION 14 capsule 0    FeroSuL 325 mg (65 mg iron) tablet Take 1 tablet (65 mg of iron) by mouth 2 times a day.      fluocinonide (Lidex) 0.05 % ointment 1 APPLICATION DAILY TOPICALLY USE ON SCALP NIGHTLY (Patient not taking: Reported on 10/3/2023) 90 g 1    hydrALAZINE (Apresoline) 25 mg tablet Take 1 tablet (25 mg) by mouth 3 times a day. 1 Tablet by mouth 3 times daily \"Only if blood pressure is above 150/90\"      insulin lispro (HumaLOG) 100 unit/mL injection INJECT UP TO 5 UNITS SUBCUTANEOUSLY THREE TIMES A DAY WITH MEALS PER SLIDING SCALE AS DIRECTED      ketoconazole (NIZOral) 2 % shampoo 1 APPLICATION DAILY TOPICALLY FOR 1 MONTH. THEN LATHER ON SCALP " EVERY FEW DAYS AS NEEDED-LET SIT FOR 4 MINUTES BEFORE WASHING OFF. (Patient not taking: Reported on 10/3/2023) 120 mL 3    losartan (Cozaar) 25 mg tablet Take 1 tablet (25 mg) by mouth once daily.      pantoprazole (ProtoNix) 40 mg EC tablet Take 1 tablet (40 mg) by mouth once daily in the morning. Take before meals. Do not crush, chew, or split.      permethrin (Elimite) 5 % cream APPLY 1 APPLICATION TOPICALLY DAILY USE ON YOUR SCALP ONCE, LEAVE ON OVERNIGHT, REPEAT ONE WEEK LATER, WASH OFF IN THE MORNING (Patient not taking: Reported on 10/3/2023) 60 g 0    pravastatin (Pravachol) 40 mg tablet Take 1 tablet (40 mg) by mouth once daily at bedtime.      predniSONE (Deltasone) 5 mg tablet Take 1 tablet (5 mg) by mouth once daily in the morning.      tacrolimus (Prograf) 1 mg capsule Take 3 capsule AM 3 Capsule PM      tacrolimus (Protopic) 0.1 % ointment APPLY TO AFFECTED AREA(S) TOPICALLY ONCE DAILY TO AFFECTED ITCHY/ IRRITATED AREAS (Patient not taking: Reported on 10/3/2023) 60 g 1    Vitamin D3 25 mcg (1,000 unit) capsule Take 2 capsules (50 mcg) by mouth once daily.      [DISCONTINUED] acetaminophen (Tylenol) 500 mg tablet TAKE 2 TABLETS BY MOUTH EVERY 8 HOURS AS NEEDED FOR PAIN 30 tablet 0    [DISCONTINUED] benzonatate (Tessalon) 100 mg capsule TAKE 1 CAPSULE BY MOUTH THREE TIMES A DAY. 30 capsule 0    [DISCONTINUED] blood-glucose sensor device CHANGE SENSOR EVERY 10 DAYS AS DIRECTED 3 each 6    [DISCONTINUED] cefdinir (Omnicef) 300 mg capsule       [DISCONTINUED] cinacalcet (Sensipar) 30 mg tablet Take 1 tablet (30 mg) by mouth once daily. Take with food or shortly afer a meal. Swallow tablet whole; do not break or divide.      [DISCONTINUED] clindamycin (Cleocin) 300 mg capsule       [DISCONTINUED] Dexcom G4 platinum  misc USE DEVICE DAILY FOR GLUCOSE TRACKING 1 each 0    [DISCONTINUED] Dexcom G7  misc Use device daily for glucose tracking      [DISCONTINUED] Dexcom G7 Sensor device Every 10  "days as directed      [DISCONTINUED] docusate sodium (Colace) 100 mg capsule       [DISCONTINUED] dulaglutide (Trulicity) 0.75 mg/0.5 mL pen injector INJECT ONE PEN (0.75 MG) UNDER THE SKIN ONCE WEEKLY 2 mL 8    [DISCONTINUED] Easy Touch Alcohol Prep Pads pads, medicated USE AS DIRECTED FOUR TIMES A DAY      [DISCONTINUED] Easy Touch Insulin Syringe 0.5 mL 31 gauge x 5/16\" syringe USE ONCE DAILY WITH INSULIN AS DIRECTED      [DISCONTINUED] fluconazole (Diflucan) 200 mg tablet       [DISCONTINUED] FreeStyle Shiv reader (FreeStyle Shiv 14 Day Hillister) misc Inject under the skin. Use as instructed      [DISCONTINUED] FreeStyle Shiv sensor system (FreeStyle Shiv 14 Day Sensor) kit Inject under the skin. Use as instructed. Test every day      [DISCONTINUED] ibuprofen 600 mg tablet Take 1 tablet (600 mg) by mouth 2 times a day as needed for mild pain (1 - 3).      [DISCONTINUED] levoFLOXacin (Levaquin) 750 mg tablet TAKE 1 TABLET BY MOUTH EVERY OTHER DAY 2 tablet 0    [DISCONTINUED] ondansetron ODT (Zofran-ODT) 4 mg disintegrating tablet DISSOLVE 1 TABLET IN MOUTH EVERY 8 HOURS AS NEEDED FOR NAUSEA AND VOMITING 20 tablet 0    [DISCONTINUED] oxyCODONE (Roxicodone) 5 mg immediate release tablet       [DISCONTINUED] oxyCODONE-acetaminophen (Percocet) 5-325 mg tablet TAKE 1 TABLET BY MOUTH EVERY 6 HOURS AS NEEDED FOR MODERATE TO SEVERE PAIN 6 tablet 0    [DISCONTINUED] polyethylene glycol-electrolytes (Nulytely) 420 gram solution PER  ENDOSCOPY INSTRUCTIONS. THIS WILL BE A SPLIT PREP. PT TO TAKE 2L NIGHT BEFORE PROCEDURE AND 2L 5 HOURS BEFORE PROCEDURE 4000 mL 0    [DISCONTINUED] sulfamethoxazole-trimethoprim (Bactrim DS) 800-160 mg tablet       [DISCONTINUED] tacrolimus (Prograf) 1 mg capsule TAKE 2 CAPSULES BY MOUTH TWO TIMES A  capsule 0    [DISCONTINUED] TechLITE Pen Needle 32 gauge x 5/32\" needle       [DISCONTINUED] True Metrix Glucose Test Strip strip USE TO TEST BLOOD SUGAR 3 TIMES DAILY      " [DISCONTINUED] Trulicity 0.75 mg/0.5 mL pen injector Inject 0.75 mg under the skin 1 (one) time per week. As directed      [DISCONTINUED] Unilet Super Thin Lancets 30 gauge misc USE TO TEST BLOOD SUGAR 3 TIMES DAILY           PHYSICAL EXAM:   Vitals:    10/04/23 1900 10/05/23 0024 10/05/23 0120 10/05/23 0414   BP: 168/79 160/74 155/65 147/69   BP Location: Right arm Right arm  Right arm   Patient Position: Lying Lying  Lying   Pulse: 69 66 74 75   Resp: 18 18 16 18   Temp: 37.1 °C (98.8 °F) 37 °C (98.6 °F) 36.6 °C (97.8 °F) 37.7 °C (99.9 °F)   TempSrc: Oral Oral Oral Temporal   SpO2: 98% 97% 97% 97%   Weight:       Height:            PHYSICAL EXAMINATION:    General appearance: well-hydrated, well nourished  Skin: skin color, texture, turgor normal,   HEENT: Anicteric sclera.  Oropharynx mucosa moist  Neck: Supple, no adenopathy;   Back: no pain to palpation over spine or costovertebral angles,   Lungs: clear to auscultation, no wheezing or rhonchi  Heart: RRR without murmur, gallop, or rubs.   Abdomen: Abdomen soft, non-tender. Bowel sounds normal. No masses, organomegaly  Extremities: Extremities normal. No  edema, or skin discoloration.   Musculoskeletal: Spine range of motion normal. Muscular strength intact  Neuro: Oriented X  3    DATA: CBC, Coags, BMP, Mg, Phos   Results for orders placed or performed during the hospital encounter of 10/02/23 (from the past 96 hour(s))   CBC and Auto Differential   Result Value Ref Range    WBC 2.8 (L) 4.4 - 11.3 x10*3/uL    nRBC 0.0 0.0 - 0.0 /100 WBCs    RBC 3.07 (L) 4.50 - 5.90 x10*6/uL    Hemoglobin 8.4 (L) 13.5 - 17.5 g/dL    Hematocrit 25.0 (L) 41.0 - 52.0 %    MCV 81 80 - 100 fL    MCH 27.4 26.0 - 34.0 pg    MCHC 33.6 32.0 - 36.0 g/dL    RDW 16.5 (H) 11.5 - 14.5 %    Platelets 104 (L) 150 - 450 x10*3/uL    MPV 9.4 7.5 - 11.5 fL    Neutrophils % 48.9 40.0 - 80.0 %    Immature Granulocytes %, Automated 0.4 0.0 - 0.9 %    Lymphocytes % 30.5 13.0 - 44.0 %    Monocytes %  12.4 2.0 - 10.0 %    Eosinophils % 7.1 0.0 - 6.0 %    Basophils % 0.7 0.0 - 2.0 %    Neutrophils Absolute 1.38 1.20 - 7.70 x10*3/uL    Immature Granulocytes Absolute, Automated 0.01 0.00 - 0.70 x10*3/uL    Lymphocytes Absolute 0.86 (L) 1.20 - 4.80 x10*3/uL    Monocytes Absolute 0.35 0.10 - 1.00 x10*3/uL    Eosinophils Absolute 0.20 0.00 - 0.70 x10*3/uL    Basophils Absolute 0.02 0.00 - 0.10 x10*3/uL   Comprehensive Metabolic Panel   Result Value Ref Range    Glucose 127 (H) 74 - 99 mg/dL    Sodium 132 (L) 136 - 145 mmol/L    Potassium 4.6 3.5 - 5.3 mmol/L    Chloride 102 98 - 107 mmol/L    Bicarbonate 25 21 - 32 mmol/L    Anion Gap 10 10 - 20 mmol/L    Urea Nitrogen 21 6 - 23 mg/dL    Creatinine 2.49 (H) 0.50 - 1.30 mg/dL    eGFR 28 (L) >60 mL/min/1.73m*2    Calcium 9.6 8.6 - 10.3 mg/dL    Albumin 3.3 (L) 3.4 - 5.0 g/dL    Alkaline Phosphatase 48 33 - 136 U/L    Total Protein 5.5 (L) 6.4 - 8.2 g/dL    AST 17 9 - 39 U/L    Bilirubin, Total 0.7 0.0 - 1.2 mg/dL    ALT 9 (L) 10 - 52 U/L   Lipase   Result Value Ref Range    Lipase 5 (L) 9 - 82 U/L   Protime-INR   Result Value Ref Range    Protime 12.8 9.8 - 12.8 seconds    INR 1.1 0.9 - 1.1   POCT GLUCOSE   Result Value Ref Range    POCT Glucose 128 (H) 74 - 99 mg/dL   POCT GLUCOSE   Result Value Ref Range    POCT Glucose 190 (H) 74 - 99 mg/dL   POCT GLUCOSE   Result Value Ref Range    POCT Glucose 161 (H) 74 - 99 mg/dL   POCT GLUCOSE   Result Value Ref Range    POCT Glucose 102 (H) 74 - 99 mg/dL   CBC   Result Value Ref Range    WBC 3.4 (L) 4.4 - 11.3 x10*3/uL    nRBC 0.0 0.0 - 0.0 /100 WBCs    RBC 3.06 (L) 4.50 - 5.90 x10*6/uL    Hemoglobin 8.4 (L) 13.5 - 17.5 g/dL    Hematocrit 25.0 (L) 41.0 - 52.0 %    MCV 82 80 - 100 fL    MCH 27.5 26.0 - 34.0 pg    MCHC 33.6 32.0 - 36.0 g/dL    RDW 16.3 (H) 11.5 - 14.5 %    Platelets 96 (L) 150 - 450 x10*3/uL    MPV 8.4 7.5 - 11.5 fL   Basic Metabolic Panel   Result Value Ref Range    Glucose 48 (LL) 74 - 99 mg/dL    Sodium 135 (L)  136 - 145 mmol/L    Potassium 3.8 3.5 - 5.3 mmol/L    Chloride 105 98 - 107 mmol/L    Bicarbonate 26 21 - 32 mmol/L    Anion Gap 8 (L) 10 - 20 mmol/L    Urea Nitrogen 20 6 - 23 mg/dL    Creatinine 2.43 (H) 0.50 - 1.30 mg/dL    eGFR 28 (L) >60 mL/min/1.73m*2    Calcium 9.9 8.6 - 10.3 mg/dL   POCT GLUCOSE   Result Value Ref Range    POCT Glucose 61 (L) 74 - 99 mg/dL   POCT GLUCOSE   Result Value Ref Range    POCT Glucose 51 (L) 74 - 99 mg/dL   POCT GLUCOSE   Result Value Ref Range    POCT Glucose 95 74 - 99 mg/dL   POCT GLUCOSE   Result Value Ref Range    POCT Glucose 119 (H) 74 - 99 mg/dL   POCT GLUCOSE   Result Value Ref Range    POCT Glucose 161 (H) 74 - 99 mg/dL   Urinalysis with Reflex Microscopic   Result Value Ref Range    Color, Urine Yellow Straw, Yellow    Appearance, Urine Clear Clear    Specific Gravity, Urine 1.009 1.005 - 1.035    pH, Urine 7.0 5.0, 5.5, 6.0, 6.5, 7.0, 7.5, 8.0    Protein, Urine >=500 (3+) (N) NEGATIVE mg/dL    Glucose, Urine NEGATIVE NEGATIVE mg/dL    Blood, Urine NEGATIVE NEGATIVE    Ketones, Urine NEGATIVE NEGATIVE mg/dL    Bilirubin, Urine NEGATIVE NEGATIVE    Urobilinogen, Urine <2.0 <2.0 mg/dL    Nitrite, Urine NEGATIVE NEGATIVE    Leukocyte Esterase, Urine NEGATIVE NEGATIVE   Urinalysis Microscopic Only   Result Value Ref Range    WBC, Urine 1-5 1-5, NONE /HPF    RBC, Urine 1-2 NONE, 1-2, 3-5 /HPF   POCT GLUCOSE   Result Value Ref Range    POCT Glucose 211 (H) 74 - 99 mg/dL         Lab Results   Component Value Date    POCGLU 211 (H) 10/04/2023    POCGLU 161 (H) 10/04/2023    POCGLU 119 (H) 10/04/2023    POCGLU 95 10/04/2023    POCGLU 51 (L) 10/04/2023    GLUCOSE 48 (LL) 10/04/2023    GLUCOSE 127 (H) 10/02/2023    GLUCOSE 186 (H) 09/25/2023    GLUCOSE 193 (H) 09/24/2023    GLUCOSE 189 (H) 09/23/2023       ASSESSMENT AND PLAN:         Abdominal pain, unclear etiology , CT of abdo no acute process  -input from surgery noted  -adv diet to FLD    Check UA, negative   S/p lap choli  surgery following, no acute process    Abdo pains persist  Consult GI    S/p renal transplant will cont with meds see orders  Dvt ppx        Noted input from surgery       Plan of care discussed with: Provider, RN, Patient .      Patient case and plan of care discussed with Dr. KRISTA Aldana.    Kurtis Tee, ANÍBAL - CNP  -In collaboration with Dr. KRISTA Aldana    [facility]  Office: [phone]  Fax: [phone]   Pt seen, dw NP   Still with lower abdo pain   Tolerating diet some what, per pt did have vomiting however then pt says he is not getting enough food  GI consulted and noted input   US of abdo done results pendng   Will follow   Also resumed cuong Aldana MD

## 2023-10-05 NOTE — CARE PLAN
The patient's goals for the shift include      The clinical goals for the shift include PT TO REMAIN FREE FROM HARM/ DISCOMFORT

## 2023-10-05 NOTE — CARE PLAN
Problem: Pain - Adult  Goal: Verbalizes/displays adequate comfort level or baseline comfort level  10/5/2023 1813 by Marva Tafoya RN  Flowsheets (Taken 10/5/2023 1813)  Verbalizes/displays adequate comfort level or baseline comfort level:   Encourage patient to monitor pain and request assistance   Assess pain using appropriate pain scale   Administer analgesics based on type and severity of pain and evaluate response  10/5/2023 1812 by Marva Tafoya RN  Outcome: Progressing     Problem: Safety - Adult  Goal: Free from fall injury  Outcome: Progressing     Problem: Discharge Planning  Goal: Discharge to home or other facility with appropriate resources  Outcome: Progressing     Problem: Chronic Conditions and Co-morbidities  Goal: Patient's chronic conditions and co-morbidity symptoms are monitored and maintained or improved  Outcome: Progressing     Problem: Diabetes  Goal: Achieve decreasing blood glucose levels by end of shift  Outcome: Progressing  Goal: Increase stability of blood glucose readings by end of shift  Outcome: Progressing  Goal: Decrease in ketones present in urine by end of shift  Outcome: Progressing  Goal: Maintain electrolyte levels within acceptable range throughout shift  Outcome: Progressing  Goal: Maintain glucose levels >70mg/dl to <250mg/dl throughout shift  Outcome: Progressing  Goal: No changes in neurological exam by end of shift  Outcome: Progressing  Goal: Learn about and adhere to nutrition recommendations by end of shift  Outcome: Progressing  Goal: Vital signs within normal range for age by end of shift  Outcome: Progressing  Goal: Increase self care and/or family involovement by end of shift  Outcome: Progressing  Goal: Receive DSME education by end of shift  Outcome: Progressing   The patient's goals for the shift include      The clinical goals for the shift include PT TO REMAIN FREE FROM HARM/ DISCOMFORT

## 2023-10-06 ENCOUNTER — APPOINTMENT (OUTPATIENT)
Dept: RADIOLOGY | Facility: HOSPITAL | Age: 68
End: 2023-10-06
Payer: COMMERCIAL

## 2023-10-06 LAB
GLUCOSE BLD MANUAL STRIP-MCNC: 142 MG/DL (ref 74–99)
GLUCOSE BLD MANUAL STRIP-MCNC: 161 MG/DL (ref 74–99)
GLUCOSE BLD MANUAL STRIP-MCNC: 169 MG/DL (ref 74–99)
GLUCOSE BLD MANUAL STRIP-MCNC: 203 MG/DL (ref 74–99)

## 2023-10-06 PROCEDURE — 2500000004 HC RX 250 GENERAL PHARMACY W/ HCPCS (ALT 636 FOR OP/ED): Performed by: FAMILY MEDICINE

## 2023-10-06 PROCEDURE — 74176 CT ABD & PELVIS W/O CONTRAST: CPT

## 2023-10-06 PROCEDURE — 2500000001 HC RX 250 WO HCPCS SELF ADMINISTERED DRUGS (ALT 637 FOR MEDICARE OP): Performed by: NURSE PRACTITIONER

## 2023-10-06 PROCEDURE — 72072 X-RAY EXAM THORAC SPINE 3VWS: CPT | Mod: FY

## 2023-10-06 PROCEDURE — 99231 SBSQ HOSP IP/OBS SF/LOW 25: CPT | Performed by: NURSE PRACTITIONER

## 2023-10-06 PROCEDURE — 2500000004 HC RX 250 GENERAL PHARMACY W/ HCPCS (ALT 636 FOR OP/ED): Performed by: NURSE PRACTITIONER

## 2023-10-06 PROCEDURE — 2500000005 HC RX 250 GENERAL PHARMACY W/O HCPCS: Performed by: NURSE PRACTITIONER

## 2023-10-06 PROCEDURE — 2500000001 HC RX 250 WO HCPCS SELF ADMINISTERED DRUGS (ALT 637 FOR MEDICARE OP): Performed by: FAMILY MEDICINE

## 2023-10-06 PROCEDURE — 74176 CT ABD & PELVIS W/O CONTRAST: CPT | Performed by: RADIOLOGY

## 2023-10-06 PROCEDURE — 2500000002 HC RX 250 W HCPCS SELF ADMINISTERED DRUGS (ALT 637 FOR MEDICARE OP, ALT 636 FOR OP/ED): Performed by: NURSE PRACTITIONER

## 2023-10-06 PROCEDURE — 96372 THER/PROPH/DIAG INJ SC/IM: CPT | Performed by: NURSE PRACTITIONER

## 2023-10-06 PROCEDURE — 1100000001 HC PRIVATE ROOM DAILY

## 2023-10-06 PROCEDURE — 72110 X-RAY EXAM L-2 SPINE 4/>VWS: CPT | Performed by: RADIOLOGY

## 2023-10-06 PROCEDURE — 72110 X-RAY EXAM L-2 SPINE 4/>VWS: CPT

## 2023-10-06 PROCEDURE — 82947 ASSAY GLUCOSE BLOOD QUANT: CPT

## 2023-10-06 PROCEDURE — 72072 X-RAY EXAM THORAC SPINE 3VWS: CPT | Performed by: RADIOLOGY

## 2023-10-06 RX ORDER — LIDOCAINE 560 MG/1
1 PATCH PERCUTANEOUS; TOPICAL; TRANSDERMAL DAILY
Status: DISCONTINUED | OUTPATIENT
Start: 2023-10-06 | End: 2023-10-13 | Stop reason: HOSPADM

## 2023-10-06 RX ADMIN — AMLODIPINE BESYLATE 10 MG: 10 TABLET ORAL at 09:09

## 2023-10-06 RX ADMIN — HYDRALAZINE HYDROCHLORIDE 25 MG: 25 TABLET ORAL at 15:42

## 2023-10-06 RX ADMIN — HYDROMORPHONE HYDROCHLORIDE 0.2 MG: 0.2 INJECTION, SOLUTION INTRAMUSCULAR; INTRAVENOUS; SUBCUTANEOUS at 09:11

## 2023-10-06 RX ADMIN — INSULIN GLARGINE 10 UNITS: 100 INJECTION, SOLUTION SUBCUTANEOUS at 09:12

## 2023-10-06 RX ADMIN — AZATHIOPRINE 50 MG: 50 TABLET ORAL at 09:09

## 2023-10-06 RX ADMIN — BENZONATATE 100 MG: 100 CAPSULE ORAL at 20:47

## 2023-10-06 RX ADMIN — PREDNISONE 5 MG: 5 TABLET ORAL at 09:09

## 2023-10-06 RX ADMIN — HYDROMORPHONE HYDROCHLORIDE 0.2 MG: 0.2 INJECTION, SOLUTION INTRAMUSCULAR; INTRAVENOUS; SUBCUTANEOUS at 18:02

## 2023-10-06 RX ADMIN — FERROUS SULFATE TAB 325 MG (65 MG ELEMENTAL FE) 65 MG OF IRON: 325 (65 FE) TAB at 09:09

## 2023-10-06 RX ADMIN — INSULIN LISPRO 1 UNITS: 100 INJECTION, SOLUTION INTRAVENOUS; SUBCUTANEOUS at 09:11

## 2023-10-06 RX ADMIN — CARVEDILOL 25 MG: 25 TABLET, FILM COATED ORAL at 09:09

## 2023-10-06 RX ADMIN — BENZONATATE 100 MG: 100 CAPSULE ORAL at 09:10

## 2023-10-06 RX ADMIN — LIDOCAINE 1 PATCH: 4 PATCH TOPICAL at 09:11

## 2023-10-06 RX ADMIN — HYDROMORPHONE HYDROCHLORIDE 0.2 MG: 0.2 INJECTION, SOLUTION INTRAMUSCULAR; INTRAVENOUS; SUBCUTANEOUS at 03:42

## 2023-10-06 RX ADMIN — HYDRALAZINE HYDROCHLORIDE 25 MG: 25 TABLET ORAL at 09:10

## 2023-10-06 RX ADMIN — CARVEDILOL 25 MG: 25 TABLET, FILM COATED ORAL at 20:48

## 2023-10-06 RX ADMIN — LOSARTAN POTASSIUM 25 MG: 25 TABLET, FILM COATED ORAL at 09:10

## 2023-10-06 RX ADMIN — GABAPENTIN 100 MG: 100 CAPSULE ORAL at 20:47

## 2023-10-06 RX ADMIN — PANTOPRAZOLE SODIUM 40 MG: 40 TABLET, DELAYED RELEASE ORAL at 06:29

## 2023-10-06 RX ADMIN — TACROLIMUS 3 MG: 1 CAPSULE ORAL at 18:02

## 2023-10-06 RX ADMIN — PRAVASTATIN SODIUM 40 MG: 40 TABLET ORAL at 20:47

## 2023-10-06 RX ADMIN — BENZONATATE 100 MG: 100 CAPSULE ORAL at 15:42

## 2023-10-06 RX ADMIN — HYDROMORPHONE HYDROCHLORIDE 0.2 MG: 0.2 INJECTION, SOLUTION INTRAMUSCULAR; INTRAVENOUS; SUBCUTANEOUS at 13:52

## 2023-10-06 RX ADMIN — HYDRALAZINE HYDROCHLORIDE 25 MG: 25 TABLET ORAL at 20:48

## 2023-10-06 RX ADMIN — FERROUS SULFATE TAB 325 MG (65 MG ELEMENTAL FE) 65 MG OF IRON: 325 (65 FE) TAB at 20:47

## 2023-10-06 RX ADMIN — INSULIN LISPRO 2 UNITS: 100 INJECTION, SOLUTION INTRAVENOUS; SUBCUTANEOUS at 18:03

## 2023-10-06 RX ADMIN — Medication 50 MCG: at 09:10

## 2023-10-06 RX ADMIN — TACROLIMUS 3 MG: 1 CAPSULE ORAL at 06:28

## 2023-10-06 ASSESSMENT — COGNITIVE AND FUNCTIONAL STATUS - GENERAL
DAILY ACTIVITIY SCORE: 24
MOBILITY SCORE: 24

## 2023-10-06 ASSESSMENT — PAIN SCALES - GENERAL
PAINLEVEL_OUTOF10: 10 - WORST POSSIBLE PAIN
PAINLEVEL_OUTOF10: 8
PAINLEVEL_OUTOF10: 8
PAINLEVEL_OUTOF10: 9

## 2023-10-06 ASSESSMENT — PAIN - FUNCTIONAL ASSESSMENT
PAIN_FUNCTIONAL_ASSESSMENT: 0-10
PAIN_FUNCTIONAL_ASSESSMENT: 0-10

## 2023-10-06 NOTE — PROGRESS NOTES
"  INPATIENT PROGRESS NOTES    PRIMARY SERVICE: Lul Aldana MD       10/6/2023  7 51a    INTERVAL HPI: Pt feels so so  Abdo pains sli better  Back more bothersome    Not better or worse w po intake      PERTINENT ROS:  REVIEW OF SYSTEMS  GENERAL: negative for fever, SEE HPI  RESPIRATORY: Negative for cough, wheezing or shortness of breath.  CARDIOVASCULAR: Negative for chest pain, leg swelling or palpitations.  GI:   See hpi  NEURO: no change per nursing  All other reviewed and negative other than HPI.      MEDICATIONS:    No current facility-administered medications on file prior to encounter.     Current Outpatient Medications on File Prior to Encounter   Medication Sig Dispense Refill    acetaminophen (Tylenol) 325 mg tablet TAKE 2 TABLETS BY MOUTH EVERY 6 HOURS AS NEEDED FOR MILD TO MODERATE PAIN 112 tablet 0    amLODIPine (Norvasc) 10 mg tablet       azaTHIOprine (Imuran) 50 mg tablet Take 1 tablet (50 mg) by mouth once daily.      Basaglar KwikPen U-100 Insulin 100 unit/mL (3 mL) pen INJECT 15 UNITS SUBCUTANEOUSLY EVERY DAY IN THE MORNING AS DIRECTED 3 mL 10    carvedilol (Coreg) 25 mg tablet Take 1 tablet (25 mg) by mouth 2 times a day.      docusate sodium (Colace) 100 mg capsule TAKE 1 CAPSULE BY MOUTH TWO TIMES A DAY TO PREVENT CONSTIPATION 14 capsule 0    FeroSuL 325 mg (65 mg iron) tablet Take 1 tablet (65 mg of iron) by mouth 2 times a day.      fluocinonide (Lidex) 0.05 % ointment 1 APPLICATION DAILY TOPICALLY USE ON SCALP NIGHTLY (Patient not taking: Reported on 10/3/2023) 90 g 1    hydrALAZINE (Apresoline) 25 mg tablet Take 1 tablet (25 mg) by mouth 3 times a day. 1 Tablet by mouth 3 times daily \"Only if blood pressure is above 150/90\"      insulin lispro (HumaLOG) 100 unit/mL injection INJECT UP TO 5 UNITS SUBCUTANEOUSLY THREE TIMES A DAY WITH MEALS PER SLIDING SCALE AS DIRECTED      ketoconazole (NIZOral) 2 % shampoo 1 APPLICATION DAILY TOPICALLY FOR 1 MONTH. THEN LATHER ON SCALP EVERY FEW DAYS " AS NEEDED-LET SIT FOR 4 MINUTES BEFORE WASHING OFF. (Patient not taking: Reported on 10/3/2023) 120 mL 3    losartan (Cozaar) 25 mg tablet Take 1 tablet (25 mg) by mouth once daily.      pantoprazole (ProtoNix) 40 mg EC tablet Take 1 tablet (40 mg) by mouth once daily in the morning. Take before meals. Do not crush, chew, or split.      permethrin (Elimite) 5 % cream APPLY 1 APPLICATION TOPICALLY DAILY USE ON YOUR SCALP ONCE, LEAVE ON OVERNIGHT, REPEAT ONE WEEK LATER, WASH OFF IN THE MORNING (Patient not taking: Reported on 10/3/2023) 60 g 0    pravastatin (Pravachol) 40 mg tablet Take 1 tablet (40 mg) by mouth once daily at bedtime.      predniSONE (Deltasone) 5 mg tablet Take 1 tablet (5 mg) by mouth once daily in the morning.      tacrolimus (Prograf) 1 mg capsule Take 3 capsule AM 3 Capsule PM      tacrolimus (Protopic) 0.1 % ointment APPLY TO AFFECTED AREA(S) TOPICALLY ONCE DAILY TO AFFECTED ITCHY/ IRRITATED AREAS (Patient not taking: Reported on 10/3/2023) 60 g 1    Vitamin D3 25 mcg (1,000 unit) capsule Take 2 capsules (50 mcg) by mouth once daily.      [DISCONTINUED] acetaminophen (Tylenol) 500 mg tablet TAKE 2 TABLETS BY MOUTH EVERY 8 HOURS AS NEEDED FOR PAIN 30 tablet 0    [DISCONTINUED] benzonatate (Tessalon) 100 mg capsule TAKE 1 CAPSULE BY MOUTH THREE TIMES A DAY. 30 capsule 0    [DISCONTINUED] blood-glucose sensor device CHANGE SENSOR EVERY 10 DAYS AS DIRECTED 3 each 6    [DISCONTINUED] cefdinir (Omnicef) 300 mg capsule       [DISCONTINUED] cinacalcet (Sensipar) 30 mg tablet Take 1 tablet (30 mg) by mouth once daily. Take with food or shortly afer a meal. Swallow tablet whole; do not break or divide.      [DISCONTINUED] clindamycin (Cleocin) 300 mg capsule       [DISCONTINUED] Dexcom G4 platinum  misc USE DEVICE DAILY FOR GLUCOSE TRACKING 1 each 0    [DISCONTINUED] Dexcom G7  misc Use device daily for glucose tracking      [DISCONTINUED] Dexcom G7 Sensor device Every 10 days as directed  "     [DISCONTINUED] docusate sodium (Colace) 100 mg capsule       [DISCONTINUED] dulaglutide (Trulicity) 0.75 mg/0.5 mL pen injector INJECT ONE PEN (0.75 MG) UNDER THE SKIN ONCE WEEKLY 2 mL 8    [DISCONTINUED] Easy Touch Alcohol Prep Pads pads, medicated USE AS DIRECTED FOUR TIMES A DAY      [DISCONTINUED] Easy Touch Insulin Syringe 0.5 mL 31 gauge x 5/16\" syringe USE ONCE DAILY WITH INSULIN AS DIRECTED      [DISCONTINUED] fluconazole (Diflucan) 200 mg tablet       [DISCONTINUED] FreeStyle Shiv reader (FreeStyle Shiv 14 Day [place]) misc Inject under the skin. Use as instructed      [DISCONTINUED] FreeStyle Shiv sensor system (FreeStyle Shiv 14 Day Sensor) kit Inject under the skin. Use as instructed. Test every day      [DISCONTINUED] ibuprofen 600 mg tablet Take 1 tablet (600 mg) by mouth 2 times a day as needed for mild pain (1 - 3).      [DISCONTINUED] levoFLOXacin (Levaquin) 750 mg tablet TAKE 1 TABLET BY MOUTH EVERY OTHER DAY 2 tablet 0    [DISCONTINUED] ondansetron ODT (Zofran-ODT) 4 mg disintegrating tablet DISSOLVE 1 TABLET IN MOUTH EVERY 8 HOURS AS NEEDED FOR NAUSEA AND VOMITING 20 tablet 0    [DISCONTINUED] oxyCODONE (Roxicodone) 5 mg immediate release tablet       [DISCONTINUED] oxyCODONE-acetaminophen (Percocet) 5-325 mg tablet TAKE 1 TABLET BY MOUTH EVERY 6 HOURS AS NEEDED FOR MODERATE TO SEVERE PAIN 6 tablet 0    [DISCONTINUED] polyethylene glycol-electrolytes (Nulytely) 420 gram solution PER  ENDOSCOPY INSTRUCTIONS. THIS WILL BE A SPLIT PREP. PT TO TAKE 2L NIGHT BEFORE PROCEDURE AND 2L 5 HOURS BEFORE PROCEDURE 4000 mL 0    [DISCONTINUED] sulfamethoxazole-trimethoprim (Bactrim DS) 800-160 mg tablet       [DISCONTINUED] tacrolimus (Prograf) 1 mg capsule TAKE 2 CAPSULES BY MOUTH TWO TIMES A  capsule 0    [DISCONTINUED] TechLITE Pen Needle 32 gauge x 5/32\" needle       [DISCONTINUED] True Metrix Glucose Test Strip strip USE TO TEST BLOOD SUGAR 3 TIMES DAILY      [DISCONTINUED] Trulicity 0.75 " mg/0.5 mL pen injector Inject 0.75 mg under the skin 1 (one) time per week. As directed      [DISCONTINUED] Unilet Super Thin Lancets 30 gauge misc USE TO TEST BLOOD SUGAR 3 TIMES DAILY           PHYSICAL EXAM:   Vitals:    10/05/23 1633 10/05/23 2032 10/06/23 0051 10/06/23 0423   BP: 132/71 141/69 155/62 161/62   BP Location: Right arm Right arm Right arm Right arm   Patient Position: Lying Lying Lying Lying   Pulse: 67 64 75 70   Resp: 16 18 18 16   Temp: 36.9 °C (98.5 °F) 37 °C (98.6 °F) 37.2 °C (99 °F) 37 °C (98.6 °F)   TempSrc: Temporal Oral Oral Oral   SpO2: 98% 95% 93% 93%   Weight:       Height:            PHYSICAL EXAMINATION:    General appearance: well-hydrated, well nourished  Skin: skin color, texture, turgor normal,   HEENT: Anicteric sclera.  Oropharynx mucosa moist  Neck: Supple, no adenopathy;   Back: no pain to palpation over spine or costovertebral angles,   Lungs: clear to auscultation, no wheezing or rhonchi  Heart: RRR without murmur, gallop, or rubs.   Abdomen: Abdomen soft, non-tender. Bowel sounds normal. No masses, organomegaly  Extremities: Extremities normal. No  edema, or skin discoloration.   Musculoskeletal: Spine range of motion normal. Muscular strength intact  Neuro: Oriented X  3    DATA: CBC, Coags, BMP, Mg, Phos   Results for orders placed or performed during the hospital encounter of 10/02/23 (from the past 96 hour(s))   CBC and Auto Differential   Result Value Ref Range    WBC 2.8 (L) 4.4 - 11.3 x10*3/uL    nRBC 0.0 0.0 - 0.0 /100 WBCs    RBC 3.07 (L) 4.50 - 5.90 x10*6/uL    Hemoglobin 8.4 (L) 13.5 - 17.5 g/dL    Hematocrit 25.0 (L) 41.0 - 52.0 %    MCV 81 80 - 100 fL    MCH 27.4 26.0 - 34.0 pg    MCHC 33.6 32.0 - 36.0 g/dL    RDW 16.5 (H) 11.5 - 14.5 %    Platelets 104 (L) 150 - 450 x10*3/uL    MPV 9.4 7.5 - 11.5 fL    Neutrophils % 48.9 40.0 - 80.0 %    Immature Granulocytes %, Automated 0.4 0.0 - 0.9 %    Lymphocytes % 30.5 13.0 - 44.0 %    Monocytes % 12.4 2.0 - 10.0 %     Eosinophils % 7.1 0.0 - 6.0 %    Basophils % 0.7 0.0 - 2.0 %    Neutrophils Absolute 1.38 1.20 - 7.70 x10*3/uL    Immature Granulocytes Absolute, Automated 0.01 0.00 - 0.70 x10*3/uL    Lymphocytes Absolute 0.86 (L) 1.20 - 4.80 x10*3/uL    Monocytes Absolute 0.35 0.10 - 1.00 x10*3/uL    Eosinophils Absolute 0.20 0.00 - 0.70 x10*3/uL    Basophils Absolute 0.02 0.00 - 0.10 x10*3/uL   Comprehensive Metabolic Panel   Result Value Ref Range    Glucose 127 (H) 74 - 99 mg/dL    Sodium 132 (L) 136 - 145 mmol/L    Potassium 4.6 3.5 - 5.3 mmol/L    Chloride 102 98 - 107 mmol/L    Bicarbonate 25 21 - 32 mmol/L    Anion Gap 10 10 - 20 mmol/L    Urea Nitrogen 21 6 - 23 mg/dL    Creatinine 2.49 (H) 0.50 - 1.30 mg/dL    eGFR 28 (L) >60 mL/min/1.73m*2    Calcium 9.6 8.6 - 10.3 mg/dL    Albumin 3.3 (L) 3.4 - 5.0 g/dL    Alkaline Phosphatase 48 33 - 136 U/L    Total Protein 5.5 (L) 6.4 - 8.2 g/dL    AST 17 9 - 39 U/L    Bilirubin, Total 0.7 0.0 - 1.2 mg/dL    ALT 9 (L) 10 - 52 U/L   Lipase   Result Value Ref Range    Lipase 5 (L) 9 - 82 U/L   Protime-INR   Result Value Ref Range    Protime 12.8 9.8 - 12.8 seconds    INR 1.1 0.9 - 1.1   POCT GLUCOSE   Result Value Ref Range    POCT Glucose 128 (H) 74 - 99 mg/dL   POCT GLUCOSE   Result Value Ref Range    POCT Glucose 190 (H) 74 - 99 mg/dL   POCT GLUCOSE   Result Value Ref Range    POCT Glucose 161 (H) 74 - 99 mg/dL   POCT GLUCOSE   Result Value Ref Range    POCT Glucose 102 (H) 74 - 99 mg/dL   CBC   Result Value Ref Range    WBC 3.4 (L) 4.4 - 11.3 x10*3/uL    nRBC 0.0 0.0 - 0.0 /100 WBCs    RBC 3.06 (L) 4.50 - 5.90 x10*6/uL    Hemoglobin 8.4 (L) 13.5 - 17.5 g/dL    Hematocrit 25.0 (L) 41.0 - 52.0 %    MCV 82 80 - 100 fL    MCH 27.5 26.0 - 34.0 pg    MCHC 33.6 32.0 - 36.0 g/dL    RDW 16.3 (H) 11.5 - 14.5 %    Platelets 96 (L) 150 - 450 x10*3/uL    MPV 8.4 7.5 - 11.5 fL   Basic Metabolic Panel   Result Value Ref Range    Glucose 48 (LL) 74 - 99 mg/dL    Sodium 135 (L) 136 - 145 mmol/L     Potassium 3.8 3.5 - 5.3 mmol/L    Chloride 105 98 - 107 mmol/L    Bicarbonate 26 21 - 32 mmol/L    Anion Gap 8 (L) 10 - 20 mmol/L    Urea Nitrogen 20 6 - 23 mg/dL    Creatinine 2.43 (H) 0.50 - 1.30 mg/dL    eGFR 28 (L) >60 mL/min/1.73m*2    Calcium 9.9 8.6 - 10.3 mg/dL   POCT GLUCOSE   Result Value Ref Range    POCT Glucose 61 (L) 74 - 99 mg/dL   POCT GLUCOSE   Result Value Ref Range    POCT Glucose 51 (L) 74 - 99 mg/dL   POCT GLUCOSE   Result Value Ref Range    POCT Glucose 95 74 - 99 mg/dL   POCT GLUCOSE   Result Value Ref Range    POCT Glucose 119 (H) 74 - 99 mg/dL   POCT GLUCOSE   Result Value Ref Range    POCT Glucose 161 (H) 74 - 99 mg/dL   Urinalysis with Reflex Microscopic   Result Value Ref Range    Color, Urine Yellow Straw, Yellow    Appearance, Urine Clear Clear    Specific Gravity, Urine 1.009 1.005 - 1.035    pH, Urine 7.0 5.0, 5.5, 6.0, 6.5, 7.0, 7.5, 8.0    Protein, Urine >=500 (3+) (N) NEGATIVE mg/dL    Glucose, Urine NEGATIVE NEGATIVE mg/dL    Blood, Urine NEGATIVE NEGATIVE    Ketones, Urine NEGATIVE NEGATIVE mg/dL    Bilirubin, Urine NEGATIVE NEGATIVE    Urobilinogen, Urine <2.0 <2.0 mg/dL    Nitrite, Urine NEGATIVE NEGATIVE    Leukocyte Esterase, Urine NEGATIVE NEGATIVE   Urinalysis Microscopic Only   Result Value Ref Range    WBC, Urine 1-5 1-5, NONE /HPF    RBC, Urine 1-2 NONE, 1-2, 3-5 /HPF   POCT GLUCOSE   Result Value Ref Range    POCT Glucose 211 (H) 74 - 99 mg/dL   Comprehensive metabolic panel   Result Value Ref Range    Glucose 149 (H) 74 - 99 mg/dL    Sodium 134 (L) 136 - 145 mmol/L    Potassium 4.1 3.5 - 5.3 mmol/L    Chloride 103 98 - 107 mmol/L    Bicarbonate 27 21 - 32 mmol/L    Anion Gap 8 (L) 10 - 20 mmol/L    Urea Nitrogen 18 6 - 23 mg/dL    Creatinine 2.55 (H) 0.50 - 1.30 mg/dL    eGFR 27 (L) >60 mL/min/1.73m*2    Calcium 10.1 8.6 - 10.3 mg/dL    Albumin 3.3 (L) 3.4 - 5.0 g/dL    Alkaline Phosphatase 64 33 - 136 U/L    Total Protein 5.9 (L) 6.4 - 8.2 g/dL    AST 21 9 - 39 U/L     Bilirubin, Total 0.6 0.0 - 1.2 mg/dL    ALT 12 10 - 52 U/L   POCT GLUCOSE   Result Value Ref Range    POCT Glucose 175 (H) 74 - 99 mg/dL   CBC   Result Value Ref Range    WBC 2.4 (L) 4.4 - 11.3 x10*3/uL    nRBC 0.0 0.0 - 0.0 /100 WBCs    RBC 2.85 (L) 4.50 - 5.90 x10*6/uL    Hemoglobin 7.9 (L) 13.5 - 17.5 g/dL    Hematocrit 23.2 (L) 41.0 - 52.0 %    MCV 81 80 - 100 fL    MCH 27.7 26.0 - 34.0 pg    MCHC 34.1 32.0 - 36.0 g/dL    RDW 16.3 (H) 11.5 - 14.5 %    Platelets 85 (L) 150 - 450 x10*3/uL    MPV 9.7 7.5 - 11.5 fL   POCT GLUCOSE   Result Value Ref Range    POCT Glucose 194 (H) 74 - 99 mg/dL   POCT GLUCOSE   Result Value Ref Range    POCT Glucose 159 (H) 74 - 99 mg/dL   POCT GLUCOSE   Result Value Ref Range    POCT Glucose 202 (H) 74 - 99 mg/dL         Lab Results   Component Value Date    POCGLU 202 (H) 10/05/2023    POCGLU 159 (H) 10/05/2023    POCGLU 194 (H) 10/05/2023    POCGLU 175 (H) 10/05/2023    POCGLU 211 (H) 10/04/2023    GLUCOSE 149 (H) 10/05/2023    GLUCOSE 48 (LL) 10/04/2023    GLUCOSE 127 (H) 10/02/2023    GLUCOSE 186 (H) 09/25/2023    GLUCOSE 193 (H) 09/24/2023       ASSESSMENT AND PLAN:         Abdominal pain, unclear etiology , CT of abdo no acute process  -input from surgery noted  -adv diet to soft     Check UA, negative   S/p lap choli surgery following, no acute process    Abdo pains persist  Consult GI, input noted   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      Patient case and plan of care discussed with Dr. KRISTA Aldana.    ANÍBAL Chapman - CNP  -In collaboration with Dr. KRISTA Aldana    [facility]  Office: [phone]  Fax: [phone]   Pt seen this  [email or web address]   His pain is now mostly in the lower back , he still has lower bado pain however improving ,tolerating food, will advance to soft diet,   CT abdo and xr of thoracic and lumbar spine noted  Reviewed prior mri of thoracic spine and no osteo at t 10 and clinical suspicion for osteo on this admission is low  Will cont with gabapentin ,   Cont with current   Possible dc to ome tomorrow if improving,   Lul Aldana MD

## 2023-10-06 NOTE — PROGRESS NOTES
GI Daily Progress Note    Assessment/Plan:    Principal Problem:    Generalized abdominal pain  Abdominal pain, unclear etiology, possibly musculoskeletal pain, pain secondary to adhesions, postoperative pain. US if mesenteric vessels was unremarkable.     Pancytopenia, unclear etiology     - supportive care   - diet as tolerated  - consider hematology consultation for new pancytopenia  - if back pain persists, recommend to repeat CT or MRI  D/w Dr Argueta, gi will sign off, please call if any questions or further assistance needed     LOS: 1 day     Manolo Bashir is a 68 y.o. male who was admitted with Generalized abdominal pain. He reports his symptoms are improving with treatment.     Subjective:    Patient expresses more back pain  Patient denies nausea, vomiting, diarrhea   He is resting without any distress.     Objective:    Vital signs in last 24 hours:  Temp:  [36.8 °C (98.2 °F)-37.2 °C (99 °F)] 36.8 °C (98.2 °F)  Heart Rate:  [58-75] 58  Resp:  [16-18] 17  BP: (132-172)/(62-79) 158/79    Intake/Output last 3 shifts:  I/O last 3 completed shifts:  In: 360 (5 mL/kg) [P.O.:360]  Out: 200 (2.8 mL/kg) [Urine:200 (0.1 mL/kg/hr)]  Weight: 72.6 kg   Intake/Output this shift:  No intake/output data recorded.    Physical Exam  Vitals reviewed.   Constitutional:       Appearance: Normal appearance.      Comments: Pt is resting, no distress,    HENT:      Head: Atraumatic.   Eyes:      Conjunctiva/sclera: Conjunctivae normal.   Pulmonary:      Effort: Pulmonary effort is normal.   Musculoskeletal:      Cervical back: Neck supple.   Neurological:      General: No focal deficit present.   Psychiatric:         Mood and Affect: Mood normal.         Behavior: Behavior normal.          Results for orders placed or performed during the hospital encounter of 10/02/23 (from the past 24 hour(s))   POCT GLUCOSE   Result Value Ref Range    POCT Glucose 159 (H) 74 - 99 mg/dL   POCT GLUCOSE   Result Value Ref Range    POCT  Glucose 202 (H) 74 - 99 mg/dL   POCT GLUCOSE   Result Value Ref Range    POCT Glucose 161 (H) 74 - 99 mg/dL   POCT GLUCOSE   Result Value Ref Range    POCT Glucose 142 (H) 74 - 99 mg/dL    Scheduled medications  amLODIPine, 10 mg, oral, Daily  azaTHIOprine, 50 mg, oral, Daily  benzonatate, 100 mg, oral, TID  carvedilol, 25 mg, oral, BID  cholecalciferol, 50 mcg, oral, Daily  ferrous sulfate, 65 mg of iron, oral, BID  gabapentin, 100 mg, oral, Nightly  heparin, 5,000 Units, subcutaneous, q8h  hydrALAZINE, 25 mg, oral, TID  insulin glargine, 10 Units, subcutaneous, Daily  insulin lispro, 0-5 Units, subcutaneous, TID with meals  lidocaine, 1 patch, transdermal, Daily  losartan, 25 mg, oral, Daily  pantoprazole, 40 mg, oral, Daily before breakfast  pravastatin, 40 mg, oral, Nightly  predniSONE, 5 mg, oral, q AM  tacrolimus, 3 mg, oral, q12h [facility]      Continuous medications     PRN medications  PRN medications: acetaminophen, dextrose, docusate sodium, glucagon, HYDROmorphone, ondansetron ODT

## 2023-10-07 LAB
ANION GAP SERPL CALC-SCNC: 11 MMOL/L (ref 10–20)
BUN SERPL-MCNC: 19 MG/DL (ref 6–23)
CALCIUM SERPL-MCNC: 10 MG/DL (ref 8.6–10.3)
CHLORIDE SERPL-SCNC: 103 MMOL/L (ref 98–107)
CO2 SERPL-SCNC: 24 MMOL/L (ref 21–32)
CREAT SERPL-MCNC: 2.61 MG/DL (ref 0.5–1.3)
ERYTHROCYTE [DISTWIDTH] IN BLOOD BY AUTOMATED COUNT: 15.9 % (ref 11.5–14.5)
GFR SERPL CREATININE-BSD FRML MDRD: 26 ML/MIN/1.73M*2
GLUCOSE BLD MANUAL STRIP-MCNC: 135 MG/DL (ref 74–99)
GLUCOSE BLD MANUAL STRIP-MCNC: 142 MG/DL (ref 74–99)
GLUCOSE BLD MANUAL STRIP-MCNC: 144 MG/DL (ref 74–99)
GLUCOSE BLD MANUAL STRIP-MCNC: 206 MG/DL (ref 74–99)
GLUCOSE SERPL-MCNC: 149 MG/DL (ref 74–99)
HCT VFR BLD AUTO: 25.6 % (ref 41–52)
HGB BLD-MCNC: 8.7 G/DL (ref 13.5–17.5)
MCH RBC QN AUTO: 27.4 PG (ref 26–34)
MCHC RBC AUTO-ENTMCNC: 34 G/DL (ref 32–36)
MCV RBC AUTO: 81 FL (ref 80–100)
NRBC BLD-RTO: 0 /100 WBCS (ref 0–0)
PLATELET # BLD AUTO: 98 X10*3/UL (ref 150–450)
PMV BLD AUTO: 8.6 FL (ref 7.5–11.5)
POTASSIUM SERPL-SCNC: 4.2 MMOL/L (ref 3.5–5.3)
RBC # BLD AUTO: 3.17 X10*6/UL (ref 4.5–5.9)
SODIUM SERPL-SCNC: 134 MMOL/L (ref 136–145)
WBC # BLD AUTO: 2.5 X10*3/UL (ref 4.4–11.3)

## 2023-10-07 PROCEDURE — 82374 ASSAY BLOOD CARBON DIOXIDE: CPT | Performed by: NURSE PRACTITIONER

## 2023-10-07 PROCEDURE — S0119 ONDANSETRON 4 MG: HCPCS | Performed by: FAMILY MEDICINE

## 2023-10-07 PROCEDURE — 2500000001 HC RX 250 WO HCPCS SELF ADMINISTERED DRUGS (ALT 637 FOR MEDICARE OP): Performed by: NURSE PRACTITIONER

## 2023-10-07 PROCEDURE — 82947 ASSAY GLUCOSE BLOOD QUANT: CPT

## 2023-10-07 PROCEDURE — 2500000004 HC RX 250 GENERAL PHARMACY W/ HCPCS (ALT 636 FOR OP/ED): Performed by: NURSE PRACTITIONER

## 2023-10-07 PROCEDURE — 2500000005 HC RX 250 GENERAL PHARMACY W/O HCPCS: Performed by: FAMILY MEDICINE

## 2023-10-07 PROCEDURE — 2500000001 HC RX 250 WO HCPCS SELF ADMINISTERED DRUGS (ALT 637 FOR MEDICARE OP): Performed by: FAMILY MEDICINE

## 2023-10-07 PROCEDURE — 96372 THER/PROPH/DIAG INJ SC/IM: CPT | Performed by: NURSE PRACTITIONER

## 2023-10-07 PROCEDURE — 99232 SBSQ HOSP IP/OBS MODERATE 35: CPT | Performed by: INTERNAL MEDICINE

## 2023-10-07 PROCEDURE — 2500000004 HC RX 250 GENERAL PHARMACY W/ HCPCS (ALT 636 FOR OP/ED): Performed by: INTERNAL MEDICINE

## 2023-10-07 PROCEDURE — 2500000005 HC RX 250 GENERAL PHARMACY W/O HCPCS: Performed by: NURSE PRACTITIONER

## 2023-10-07 PROCEDURE — 36415 COLL VENOUS BLD VENIPUNCTURE: CPT | Performed by: NURSE PRACTITIONER

## 2023-10-07 PROCEDURE — 2500000004 HC RX 250 GENERAL PHARMACY W/ HCPCS (ALT 636 FOR OP/ED): Performed by: FAMILY MEDICINE

## 2023-10-07 PROCEDURE — 2500000002 HC RX 250 W HCPCS SELF ADMINISTERED DRUGS (ALT 637 FOR MEDICARE OP, ALT 636 FOR OP/ED): Performed by: NURSE PRACTITIONER

## 2023-10-07 PROCEDURE — 85027 COMPLETE CBC AUTOMATED: CPT | Performed by: NURSE PRACTITIONER

## 2023-10-07 PROCEDURE — 1100000001 HC PRIVATE ROOM DAILY

## 2023-10-07 RX ORDER — SODIUM CHLORIDE 9 MG/ML
75 INJECTION, SOLUTION INTRAVENOUS CONTINUOUS
Status: DISCONTINUED | OUTPATIENT
Start: 2023-10-07 | End: 2023-10-11

## 2023-10-07 RX ADMIN — LIDOCAINE 1 PATCH: 4 PATCH TOPICAL at 09:07

## 2023-10-07 RX ADMIN — HYDRALAZINE HYDROCHLORIDE 25 MG: 25 TABLET ORAL at 16:02

## 2023-10-07 RX ADMIN — BENZONATATE 100 MG: 100 CAPSULE ORAL at 21:06

## 2023-10-07 RX ADMIN — AMLODIPINE BESYLATE 10 MG: 10 TABLET ORAL at 09:07

## 2023-10-07 RX ADMIN — TACROLIMUS 3 MG: 1 CAPSULE ORAL at 17:42

## 2023-10-07 RX ADMIN — GABAPENTIN 100 MG: 100 CAPSULE ORAL at 21:06

## 2023-10-07 RX ADMIN — ONDANSETRON 4 MG: 4 TABLET, ORALLY DISINTEGRATING ORAL at 09:21

## 2023-10-07 RX ADMIN — PANTOPRAZOLE SODIUM 40 MG: 40 TABLET, DELAYED RELEASE ORAL at 06:09

## 2023-10-07 RX ADMIN — HYDROMORPHONE HYDROCHLORIDE 0.4 MG: 1 INJECTION, SOLUTION INTRAMUSCULAR; INTRAVENOUS; SUBCUTANEOUS at 10:55

## 2023-10-07 RX ADMIN — HYDRALAZINE HYDROCHLORIDE 25 MG: 25 TABLET ORAL at 09:06

## 2023-10-07 RX ADMIN — AZATHIOPRINE 50 MG: 50 TABLET ORAL at 09:06

## 2023-10-07 RX ADMIN — CARVEDILOL 25 MG: 25 TABLET, FILM COATED ORAL at 21:06

## 2023-10-07 RX ADMIN — BENZONATATE 100 MG: 100 CAPSULE ORAL at 16:02

## 2023-10-07 RX ADMIN — CARVEDILOL 25 MG: 25 TABLET, FILM COATED ORAL at 09:07

## 2023-10-07 RX ADMIN — BENZONATATE 100 MG: 100 CAPSULE ORAL at 09:06

## 2023-10-07 RX ADMIN — HYDROMORPHONE HYDROCHLORIDE 0.2 MG: 0.2 INJECTION, SOLUTION INTRAMUSCULAR; INTRAVENOUS; SUBCUTANEOUS at 06:09

## 2023-10-07 RX ADMIN — HYDRALAZINE HYDROCHLORIDE 25 MG: 25 TABLET ORAL at 21:06

## 2023-10-07 RX ADMIN — SODIUM CHLORIDE 75 ML/HR: 9 INJECTION, SOLUTION INTRAVENOUS at 17:41

## 2023-10-07 RX ADMIN — INSULIN GLARGINE 10 UNITS: 100 INJECTION, SOLUTION SUBCUTANEOUS at 09:07

## 2023-10-07 RX ADMIN — LOSARTAN POTASSIUM 25 MG: 25 TABLET, FILM COATED ORAL at 09:07

## 2023-10-07 RX ADMIN — HYDROMORPHONE HYDROCHLORIDE 0.4 MG: 1 INJECTION, SOLUTION INTRAMUSCULAR; INTRAVENOUS; SUBCUTANEOUS at 21:12

## 2023-10-07 RX ADMIN — FERROUS SULFATE TAB 325 MG (65 MG ELEMENTAL FE) 65 MG OF IRON: 325 (65 FE) TAB at 09:05

## 2023-10-07 RX ADMIN — HYDROMORPHONE HYDROCHLORIDE 0.2 MG: 0.2 INJECTION, SOLUTION INTRAMUSCULAR; INTRAVENOUS; SUBCUTANEOUS at 01:19

## 2023-10-07 RX ADMIN — Medication 50 MCG: at 09:06

## 2023-10-07 RX ADMIN — FERROUS SULFATE TAB 325 MG (65 MG ELEMENTAL FE) 65 MG OF IRON: 325 (65 FE) TAB at 21:06

## 2023-10-07 RX ADMIN — HYDROMORPHONE HYDROCHLORIDE 0.4 MG: 1 INJECTION, SOLUTION INTRAMUSCULAR; INTRAVENOUS; SUBCUTANEOUS at 16:02

## 2023-10-07 RX ADMIN — TACROLIMUS 3 MG: 1 CAPSULE ORAL at 06:09

## 2023-10-07 RX ADMIN — PRAVASTATIN SODIUM 40 MG: 40 TABLET ORAL at 21:06

## 2023-10-07 RX ADMIN — PREDNISONE 5 MG: 5 TABLET ORAL at 09:21

## 2023-10-07 ASSESSMENT — PAIN - FUNCTIONAL ASSESSMENT
PAIN_FUNCTIONAL_ASSESSMENT: 0-10

## 2023-10-07 ASSESSMENT — COGNITIVE AND FUNCTIONAL STATUS - GENERAL
DAILY ACTIVITIY SCORE: 24
DAILY ACTIVITIY SCORE: 24
MOBILITY SCORE: 24
DAILY ACTIVITIY SCORE: 24
MOBILITY SCORE: 24
MOBILITY SCORE: 24

## 2023-10-07 ASSESSMENT — PAIN SCALES - PAIN ASSESSMENT IN ADVANCED DEMENTIA (PAINAD): BREATHING: NORMAL

## 2023-10-07 ASSESSMENT — PAIN DESCRIPTION - DESCRIPTORS
DESCRIPTORS: DISCOMFORT;CRUSHING
DESCRIPTORS: DISCOMFORT;ACHING
DESCRIPTORS: DISCOMFORT;CRUSHING

## 2023-10-07 ASSESSMENT — PAIN SCALES - GENERAL
PAINLEVEL_OUTOF10: 8
PAINLEVEL_OUTOF10: 4
PAINLEVEL_OUTOF10: 9
PAINLEVEL_OUTOF10: 8
PAINLEVEL_OUTOF10: 8
PAINLEVEL_OUTOF10: 10 - WORST POSSIBLE PAIN
PAINLEVEL_OUTOF10: 9

## 2023-10-07 ASSESSMENT — PAIN SCALES - WONG BAKER: WONGBAKER_NUMERICALRESPONSE: HURTS EVEN MORE

## 2023-10-07 NOTE — SIGNIFICANT EVENT
"Responded to overhead \"rapid response\" alert, which was initiated by bedside RN for new onset hypotension, hypoxia and emesis. Patient found in respiratory distress requiring 10L oxygen via mask, difficulty with obtaining an accurate oxygen saturation due to poor vasculature. Patient arrived to the hospital in fluid overload state, unknown when last peritoneal dialysis session was. On examination appears volume overloaded, with appreciable edema in bilateral lower extremities and abdomen. EKG was obtained, difficult to interpret, but appeared consistent with atrial fibrillation, of which he does have a long-standing history with medication non-compliance. Blood sugar was also obtained, ~70, hypoglycemia order set was ordered and blood sugar was corrected appropriately. Persistent hypotension with systolic pressures in the 80's and diastolic's in the 50's. Given history of PD, acute hypoxic respiratory failure, volume overload and now hemodynamic instability will facilitate a transfer to the stepdown unit. Nephrology is on-board, and will proceed with dialysis. Case was discussed with on-call intensivist and nephrologist.     Primary team is unable to be reached at this time.     MAGDALENO REYES on 10/7/23 at 11:14 AM.   "

## 2023-10-07 NOTE — PROGRESS NOTES
Manolo Bashir is a 68 y.o. male     The patient continues to have significant abdominal discomfort  I was called by the nurse this morning requesting additional pain medications    Reviewed imaging studies  CT abdomen and pelvis was unremarkable  Vascular studies did not show any mesenteric ischemia  Surgery and GI have seen the patient and no further recommendations have been offered  However the patient continues to be in discomfort    Review of Systems     Constitutional: In distress  Cardiovascular: the heart rate was not slow, the heart rate was not fast, no chest pain, no palpitations, no intermittent leg claudication and no lower extremity edema.   Respiratory: no cough, wheezing or shortness of breath at rest or exertion  Gastrointestinal: Abdominal pain  Musculoskeletal: no arthralgias, no myalgias, no back pain, no joint swelling, no joint stiffness, no limb pain and no limb swelling.   Integumentary: no skin rashes, no skin lesions, no itching, no skin wound and no dry skin.   Neurological: no headache, no confusion, no numbness, no dizziness, no tingling and no fainting.   All other systems have been reviewed and are negative for complaint.       Vitals:    10/07/23 1256   BP: 142/68   Pulse: 64   Resp: 17   Temp: 36.9 °C (98.5 °F)   SpO2: 97%        Scheduled medications  amLODIPine, 10 mg, oral, Daily  azaTHIOprine, 50 mg, oral, Daily  benzonatate, 100 mg, oral, TID  carvedilol, 25 mg, oral, BID  cholecalciferol, 50 mcg, oral, Daily  ferrous sulfate, 65 mg of iron, oral, BID  gabapentin, 100 mg, oral, Nightly  heparin, 5,000 Units, subcutaneous, q8h  hydrALAZINE, 25 mg, oral, TID  insulin glargine, 10 Units, subcutaneous, Daily  insulin lispro, 0-5 Units, subcutaneous, TID with meals  lidocaine, 1 patch, transdermal, Daily  losartan, 25 mg, oral, Daily  pantoprazole, 40 mg, oral, Daily before breakfast  pravastatin, 40 mg, oral, Nightly  predniSONE, 5 mg, oral, q AM  tacrolimus, 3 mg, oral, q12h  ZOË      Continuous medications     PRN medications  PRN medications: acetaminophen, dextrose, docusate sodium, glucagon, HYDROmorphone, ondansetron ODT    Lab Review   Results from last 7 days   Lab Units 10/07/23  0535 10/05/23  1112 10/04/23  1130   WBC AUTO x10*3/uL 2.5* 2.4* 3.4*   HEMOGLOBIN g/dL 8.7* 7.9* 8.4*   HEMATOCRIT % 25.6* 23.2* 25.0*   PLATELETS AUTO x10*3/uL 98* 85* 96*     Results from last 7 days   Lab Units 10/07/23  0535 10/05/23  0813 10/04/23  1132 10/02/23  1329   SODIUM mmol/L 134* 134* 135* 132*   POTASSIUM mmol/L 4.2 4.1 3.8 4.6   CHLORIDE mmol/L 103 103 105 102   CO2 mmol/L 24 27 26 25   BUN mg/dL 19 18 20 21   CREATININE mg/dL 2.61* 2.55* 2.43* 2.49*   CALCIUM mg/dL 10.0 10.1 9.9 9.6   PROTEIN TOTAL g/dL  --  5.9*  --  5.5*   BILIRUBIN TOTAL mg/dL  --  0.6  --  0.7   ALK PHOS U/L  --  64  --  48   ALT U/L  --  12  --  9*   AST U/L  --  21  --  17   GLUCOSE mg/dL 149* 149* 48* 127*            XR lumbar spine complete 4+ views    Result Date: 10/6/2023  Interpreted By:  Renaldo Cunningham, STUDY: XR LUMBAR SPINE COMPLETE 4+ VIEWS;  10/6/2023 11:27 am   INDICATION: Signs/Symptoms:lower back pain.   COMPARISON: None.   ACCESSION NUMBER(S): [id number]   ORDERING CLINICIAN: BENNY MUNSON   FINDINGS: Multiple views of the  lumbar spine are obtained. Alignment is intact. The vertebral body heights and disc heights are preserved. Endplate sclerosis and osteophytes are seen throughout the spine. No acute fracture-dislocation.       Minimal discogenic degenerative changes as described.     Signed by: Renaldo Cunningham 10/6/2023 11:49 AM Dictation workstation:   [id number]    XR thoracic spine 3 views    Result Date: 10/6/2023  Interpreted By:  Renaldo Cunningham, STUDY: XR THORACIC SPINE 3 VIEWS;  10/6/2023 11:27 am   INDICATION: Signs/Symptoms:lower back.   COMPARISON: None.   ACCESSION NUMBER(S): [id number]   ORDERING CLINICIAN: BENNY MUNSON   FINDINGS: Multiple views of the  thoracic spine are obtained.  Apparent osteopenia. Mild to moderate discogenic degenerative changes. Minimal anterior loss of height of 2 adjacent midthoracic spine vertebral bodies unchanged.   Endplate irregularity and slight widening of the disc space at T6-C7 and T10/T11. Underlying discitis/osteomyelitis not excluded. Correlate clinically and follow-up MRI as clinically warranted.       Apparent osteopenia and discogenic degenerative changes.     Endplate irregularity and slight widening of the disc space at T6-C7 and T10/T11. Underlying discitis/osteomyelitis not excluded. Correlate clinically and follow-up MRI as clinically warranted.       Signed by: Renaldo Cunningham 10/6/2023 11:45 AM Dictation workstation:   [id number]    CT abdomen pelvis wo IV contrast    Result Date: 10/6/2023  Interpreted By:  Renaldo Cunningham, STUDY: CT ABDOMEN PELVIS WO IV CONTRAST;  10/6/2023 9:27 am   INDICATION: Signs/Symptoms:abdominal pain.   COMPARISON: 10/02/2023   ACCESSION NUMBER(S): [id number]   ORDERING CLINICIAN: BENNY MUNSON   TECHNIQUE: CT of the abdomen and pelvis was performed. Contiguous axial images were obtained at 3 mm slice thickness through the abdomen and pelvis. Coronal and sagittal reconstructions at 3 mm slice thickness were performed.  No intravenous contrast was administered; positive oral contrast was given.   FINDINGS: Please note that the evaluation of vessels, lymph nodes and organs is limited without intravenous contrast.   LOWER CHEST: The visualized lung base is remarkable for bibasilar pleural effusions and cardiomegaly. These are similar to the prior exam.   ABDOMEN:   LIVER: The liver is grossly unremarkable. No definite focal liver lesions.   BILE DUCTS: No bile duct dilatation.   GALLBLADDER: The gallbladder is not clearly identified and per history surgically absent. Minimal fluid and linear area of high density in the gallbladder fossa and similar to the prior exam..   PANCREAS: Unremarkable pancreas within the limits of this  unenhanced study.   SPLEEN: Grossly unremarkable.   ADRENAL GLANDS: No adrenal masses.   KIDNEYS AND URETERS: A trophy of the native kidneys in.. No evidence of hydronephrosis. Transplant kidney within the left aspect of the pelvis. No hydronephrosis of the transplant kidney. PELVIS:   BLADDER: The bladder is incompletely distended and evaluated.   REPRODUCTIVE ORGANS: No definite masses.   BOWEL: No evidence of bowel obstruction. No focal inflammatory changes. Postsurgical changes and sutures involving a loop of colon in the upper anterior abdomen. Also sutures in the loop of small bowel in the right mid abdomen anteriorly.   VESSELS: Normal caliber aorta. Small vessel calcification throughout.   PERITONEUM/RETROPERITONEUM/LYMPH NODES: Free pelvic fluid. No significant retroperitoneal adenopathy.   ABDOMINAL WALL: Grossly unremarkable.   BONES: Discogenic degenerative changes.       1. Nonspecific free pelvic fluid. 2. Interval cholecystectomy. 3. Persistent small bibasilar pleural effusions. 4. Atrophic native kidneys with a transplant kidney within the left aspect of the pelvis.   MACRO: None   Signed by: Renaldo Cunningham 10/6/2023 10:00 AM Dictation workstation:   [id number]    Vascular US mesenteric artery duplex complete    Result Date: 10/6/2023             [place] [address]   Tel [phone] and Fax [phone]  Vascular Lab Report Mesenteric Ultrasound  Patient Name:      KELVIN Flannery Physician:  [id number] Calvin Mohan DO Study Date:        10/5/2023           Ordering Provider:  [id number] SIDDIQUI MRN/PID:           [id number]            Technologist:       Kaur Hightower JULIO CESAR Accession#:        [id number]        Technologist 2: Date of Birth/Age: 1955 / 68      Encounter#:         [id number]                    years Gender:            M Admission Status:  Inpatient           Location  Performed: [facility]  Diagnosis/ICD: Lower abdominal/groin pain-R10.30 Indication:  CONCLUSIONS: Mesenteric: The celiac, hepatic, splenic and SMA appear widely patent with no evidence of stenosis. The JAMIE appears widely patent. The patient was NPO for this study. There is shadowing artifact noted at the origin of the celiac artery. The celiac artery with inspiration demonstrates a velocity of 98 cm/s.  Imaging & Doppler Findings:  Aorta PSV         118 cm/s Celiac Origin  cm/s Celiac Prox PSV   104 cm/s Celiac Mid PSV    117 cm/s Celiac Dist PSV   144 cm/s SMA Origin PSV    119 cm/s SMA Prox PSV      136 cm/s SMA Mid PSV       123 cm/s SMA Dist PSV      136 cm/s JAMIE PSV           107 cm/s JAMIE Prox PSV      116 cm/s Hepatic PSV       104 cm/s Splenic PSV       141 cm/s   [id number] Calvin Mohan DO Electronically signed by [id number] Calvin Mohan DO on 10/6/2023 at 8:37:34 AM  ** Final **     CT abdomen pelvis wo IV contrast    Result Date: 10/2/2023  Interpreted By:  Constantino Jin, STUDY: CT ABDOMEN PELVIS WO IV CONTRAST; 10/2/2023 11:28 am   INDICATION: Signs/Symptoms:s/p lap eduarda 1 week ago, now having RUQ pain, N/V, h/o renal transplant.   COMPARISON: CT abdomen and pelvis dated 09/17/2023   ACCESSION NUMBER(S): [id number]   ORDERING CLINICIAN: CATALINO PENNINGTON   TECHNIQUE: Contiguous axial images were obtained at 3mm slice thickness through the abdomen and pelvis without intravenous contrast administration. Coronal and sagittal reconstructions at 3 mm slice thickness were performed. Intravenous contrast was not given per the referring physician's request.   FINDINGS: The study is severely limited by the lack of intravenous contrast.   LOWER CHEST: Evaluation of the visualized lung bases demonstrate small bilateral pleural effusions, larger on the right than on the left, similar to the prior study. Dependent airspace consolidations are seen bilaterally, and may represent atelectasis and/or pneumonia. The heart  is at the upper limits of normal for size, similar to the prior study.   ABDOMEN:   LIVER: The liver is within normal limits for appearance, without evidence of focal masses.   BILE DUCTS: No definite intra or extrahepatic biliary dilatation is identified.   GALLBLADDER: The gallbladder is surgically absent.   PANCREAS: The pancreas is within normal limits for appearance, without evidence of focal masses.   SPLEEN: The spleen is within normal limits for size. No focal splenic mass is seen.   ADRENAL GLANDS: No definite adrenal nodules or masses are seen bilaterally.   KIDNEYS AND URETERS: The native kidneys are severely atrophic bilaterally. A transplanted kidney is seen in the left pelvic fossa. There is no hydronephrosis, hydroureter or renal/ ureteral calculus identified. No definite focal renal mass is seen, though evaluation is severely limited by the lack of intravenous contrast.   PELVIS:   BLADDER: The bladder is decompressed.   REPRODUCTIVE ORGANS: The prostate is within normal limits for appearance.   BOWEL: The patient is status post right hemicolectomy with ileal colonic anastomosis. The residual colon and small bowel are within normal limits for course, caliber and appearance, without evidence of wall thickening or obstruction. No CT evidence of acute diverticulitis is seen.   VESSELS: Scattered atherosclerotic calcifications are seen throughout the infrarenal abdominal aorta and iliac arteries. The abdominal aorta is within normal limits for course, caliber and appearance, without evidence of aneurysm.   PERITONEUM/RETROPERITONEUM/LYMPH NODES: There is no free intraperitoneal air or free fluid identified. No gross mesenteric or retroperitoneal lymphadenopathy is identified.   BONE AND SOFT TISSUE: There is no evidence of acute fracture identified. No evidence of abdominal wall mass or hernia is identified.       1. Postoperative changes, as above. 2. No evidence of bowel obstruction, free  intraperitoneal air or abnormal intra-abdominal fluid collection. 3. Bilateral pleural effusions and basilar airspace consolidations, as above.   MACRO: None   Signed by: Constantino Jin 10/2/2023 11:46 AM Dictation workstation:   [id number]    XR chest 1 view    Result Date: 9/23/2023  Interpreted By:  CONSTANTINO JIN MD MRN: [id number] Patient Name: KELVIN ROB  STUDY: CHEST 1 VIEW  9/23/2023 2:05 pm  INDICATION: sob  COMPARISON: 09/17/2023  ACCESSION NUMBER(S): [id number]  ORDERING CLINICIAN: BOBBI PALMA  TECHNIQUE: A single AP portable radiograph of the chest was obtained.  FINDINGS: Mild diffuse interstitial infiltrates are seen bilaterally, and may represent edema and/or pneumonia. No pneumothorax is identified. The cardiac silhouette is within normal limits for size.      Diffuse interstitial infiltrates bilaterally, as above. Clinical correlation and continued follow-up until clearing is recommended.  MACRO: None.    NM liver spleen    Result Date: 9/22/2023  Interpreted By:  TRAVIS BOSE MD MRN: [id number] Patient Name: KELVIN ROB  STUDY: BILIARY WITH EF W OR W/O CCK;  9/22/2023 4:25 pm  INDICATION: nausea/vomiting, sludge in gallbladder on CT .  COMPARISON: CT of abdomen and pelvis on 09/17/2023  ACCESSION NUMBER(S): [id number]  ORDERING CLINICIAN: NIRANJAN CRUZ  TECHNIQUE: DIVISION OF NUCLEAR MEDICINE HEPATOBILIARY SCAN (HIDA), QUANTITATIVE  The patient received an intravenous dose of 6.0 mCi of Tc-99m mebrofenin (Choletec).  Sequential images of the upper abdomen were then acquired over the next 60 minutes.  An intravenous infusion of the cholecystokinin (CCK) analogue, Sincalide, was then administered followed by an additional period of imaging.  Computer quantification of gallbladder emptying was also performed  FINDINGS: There is prompt accumulation of activity within the liver and normal subsequent excretion via the biliary ductal system into the small bowel.  The gallbladder first visualizes at about   22 minutes after radiopharmaceutical injection and progressively fills.  After Sincalide administration, there is no contraction of the gallbladder. The gallbladder ejection fraction is calculated to be  0 % (normal above 38%).      1. This study demonstrates patency of cystic duct and common bile duct, no evidence of acute cholecystitis. 2. No contraction of the gallbladder after CCK administration, suggestive of biliary dyskinesia.  I personally reviewed the images/study. This study was interpreted at [facility], [place], [place].    XR abdomen 1 view    Result Date: 9/22/2023  Interpreted By:  AARTI MONTEZ MD MRN: [id number] Patient Name: KELVIN ROB  STUDY: ABDOMEN AP VIEW;  9/22/2023 4:22 pm  INDICATION: n/v .  COMPARISON: CT scan from 09/17/2023. KUB from 08/22/2023.  ACCESSION NUMBER(S): [id number]  ORDERING CLINICIAN: BENNY MUNSON  TECHNIQUE:   2 supine views of the abdomen and pelvis were obtained.  FINDINGS: There was   a colonic anastomosis in the region of the left side of the transverse colon, and a presume small-bowel anastomosis in the mid right abdomen. There is mild-to-moderate stool and gas in the left transverse colon down through the rectum. There is mild gas in nondilated loops of small bowel in the central abdomen.. Surgical clips in the left pelvis consistent with known left pelvic renal allograft. There was no gross organomegaly. Arterial calcifications in the iliac arteries. No destructive bone lesion.  Sclerotic arthritic changes in both SI joints. The extreme lung bases were clear. Cardiomegaly.      Previous intestinal surgery.  Retained colonic stool and gas as described. Mild small-bowel gas without suspicious small bowel dilatation.  MACRO: None    US chest    Result Date: 9/19/2023  Interpreted By:  ASIF BAUTISTA CNP MRN: [id number] Patient Name: KELVIN ROB  STUDY: US CHEST; Right;  9/19/2023 2:20 pm  INDICATION: thoracentesis .  COMPARISON:  None.  ACCESSION NUMBER(S): [id number]  ORDERING CLINICIAN: RAIMUNDO LEOS  TECHNIQUE: Limited ultrasound of the chest for thoracentesis  FINDINGS: Focused ultrasound examination of the right chest demonstrates scant pleural effusion.      Scant Right pleural effusion, no thoracentesis indicated.  MACRO: None    MR lumbar spine w and wo IV contrast    Result Date: 9/19/2023  Interpreted By:  REY TAYLOR MD and DRE GRANT MD MRN: [id number] Patient Name: KELVIN ROB  STUDY: MR L-SPINE WO/W CONTRAST;  9/19/2023 9:59 am  INDICATION: intractable back pain - hx of prostate cancer, DM2 rule out infection, mets .  COMPARISON: CT L-spine 09/17/2023, CT abdomen pelvis 09/17/2023  ACCESSION NUMBER(S): [id number]  ORDERING CLINICIAN: RADHA BALDERRAMA  TECHNIQUE: Sagittal T1, T2, STIR, axial T1 and T2 weighted images of the lumbar spine were acquired. Following administration of 15 mL Dotarem, additional T1 weighted images were obtained.  FINDINGS: For labeling purposes, the last intervertebral body disc space is labeled L5-S1.  Vertebrae: The height, alignment, and signal of the lumbar vertebral bodies are preserved.  Intervertebral Discs: Multilevel intervertebral body disc desiccation. Otherwise, vertebral body heights are overall well-maintained.  Conus medullaris: The lower thoracic cord appears unremarkable. The conus medullaris terminates appropriately at L1.  T12-L1: There is no significant central canal or neural foraminal stenosis.  L1-2: There is no significant central canal or neural foraminal stenosis. Mild ligamentum flavum hypertrophy and facet arthropathy with posterior indentation of the thecal sac.  L2-3: There is no significant central canal or neural foraminal stenosis. Moderate ligamentum flavum hypertrophy and facet arthropathy with posterior indentation of the thecal sac.  L3-4: There is a focus of abnormal signal in the right-sided L4 pedicle best appreciated on parasagittal T2 weighted image 7/23.  There is no appreciable degenerative change in the adjacent facet joint. No associated contrast enhancement. Consider incidental osseous hemangioma, stress fracture as well as osteoid osteoma no measurable central canal stenosis. Moderate ligamentum flavum hypertrophy and facet arthropathy with posterior indentation thecal sac. There is resultant mild bilateral neural foraminal stenosis.  L4-5: There is no significant central canal or neural foraminal stenosis. Moderate ligamentum flavum hypertrophy and facet arthropathy.  L5-S1: There is no significant central canal or neural foraminal stenosis. There is a focus of sclerosis in the S2 vertebral body that can not be further characterized and likely represents a bone island. No associated contrast enhancement.  Bilateral native kidneys are atrophic. There is partial visualization of a left iliac fossa renal transplant.      1. Nonspecific focus of abnormal signal in the right-sided pedicle of L4 can not be further characterized but is consistent with the presence of osseous hemangioma, stress fracture or osteoid osteoma. No evidence of discitis or osteomyelitis. 2. Bone island in the S2 vertebral body 3. No evidence of metastatic disease to the lumbar spine. 4. Mild-to-moderate multilevel degenerative change of the lumbar spine with ligamentum flavum hypertrophy, facet arthropathy, and mild neural foraminal stenosis at L3-L4.  I personally reviewed the images/study, and I agree with the findings as stated above. This study was interpreted at [facility], [place], [place].    US right upper quadrant    Result Date: 9/19/2023  Interpreted By:  PALLAVI MCGRAW MD STUDY: Right Upper Quadrant Ultrasound; 9/19/2023 6:35 AM  INDICATION: Biliary colic.  COMPARISON: CT AP 9/17/23, 9/13/23 and XR abdomen 8/22/23.  ACCESSION NUMBER(S): [id number]  ORDERING CLINICIAN: RADHA BALDERRAMA PA-C  TECHNIQUE: Ultrasound of the Right Upper Quadrant.   FINDINGS: LIVER: The liver appears sonographically normal.  Normal echogenicity.  The liver appears to be homogeneous with no focal lesion   GALLBLADDER: The gallbladder : the gallbladder is mildly contracted.  There is no focal tenderness to gallbladder palpation.  No evidence of stone disease.  The gallbladder wall appears to be mildly prominent but I think this is in large part related to the fact that this is a contracted gallbladder  BILE DUCTS: The common bile duct measures 0.6 cm.  There is no intrahepatic biliary dilatation.   PANCREAS: The pancreas : The pancreas appears to be sonographically normal--- suboptimal visualization of the pancreatic tail--- there may be one or 2 punctate calcifications in the pancreas.  Correlate with any previous history of pancreatitis  RIGHT KIDNEY: The right kidney is surgically absent.  One can see a right pleural effusion and there is a trace amount of free fluid in the abdomen        Suboptimal visualization of the pancreas but what we see appears normal------ there may be one or 2 punctate calcifications of the pancreas.  Correlate with any previous history of pancreatitis  Partially contracted gallbladder but no gross evidence of stone disease or pericholecystic fluid -----no tenderness to gallbladder palpation  Surgically absent kidney  No biliary ductal dilatation   Signed by Zac Dillon MD    CT lumbar spine wo IV contrast    Result Date: 9/17/2023  Interpreted By:  BRITTANY VASQUEZ MD MRN: [id number] Patient Name: KELVIN ROB  STUDY: CT L-SPINE WO CONTRAST  9/17/2023 5:10 pm  INDICATION: diffuse and worsening abd pain, history of renal transplant  COMPARISON: None.  ACCESSION NUMBER(S): [id number]  ORDERING CLINICIAN: ROXANNE LAZO  TECHNIQUE: Axial CT images of the lumbar spine are obtained. Axial, coronal and sagittal reconstructions are provided for review.  FINDINGS: There are 5 lumbar type non rib-bearing vertebral bodies, with the lowest  well-formed intervertebral disc space labeled L5-S1.  Lumbar vertebral alignment is maintained, without significant spondylolisthesis.  Lumbar vertebral body heights are intact, without evidence of compression fractures, although multilevel insufficiency endplate changes with associated Schmorl's nodes are present, most conspicuous along the superior endplates of L1, L2 and L3 and inferior endplate of L3.  Posterior elements of the lumbar spine do not demonstrate any acute abnormalities. No displaced spinous or transverse process fracture or abnormal intra spinous distance widening is present. Low corticated ossific fragment in the expected location of the transverse processes are of T12 are favored to be non fused ossification centers.  Facet joints are preserved without evidence of perching or subluxation.  Mild intervertebral disc height loss is present at L2-L3 and L1-L2.  Although the exam is not optimized to assess the spinal canal, there is likely at least mild-to-moderate spinal canal narrowing possible at the level of L4-L5 due to bulging disc and ligamentum flavum thickening.  Mild neural foraminal stenosis is present at the levels of L3-L4, L4-L5 and L5-S1 due to bulging disc and endplate spurring, worst at L3-L4 on the left.  Paraspinal musculature does not demonstrate any acute abnormalities.      1.  No evidence of acute trauma or high-grade stenosis in the lumbar spine. 2. Multilevel degenerative changes of the lumbar spine, with likely mild spinal canal narrowing present at the level of L4-L5 and mild-to-moderate neural foraminal stenosis present at the levels of L3-L4 due to combination of bulging disc, endplate spurring and hypertrophic facet changes.  MACRO: None    CT abdomen pelvis wo IV contrast    Result Date: 9/17/2023  Interpreted By:  BRITTANY VASQUEZ MD MRN: [id number] Patient Name: KELVIN ROB  STUDY: CT ABDOMEN AND PELVIS WO CONTRAST;  9/17/2023 5:10 pm  INDICATION: diffuse and  worsening abd pain, history of renal transplant .  COMPARISON: CT abdomen and pelvis dated 09/13/2023  ACCESSION NUMBER(S): [id number]  ORDERING CLINICIAN: ROXANNE LAZO  TECHNIQUE: CT of the abdomen and pelvis was performed. Contiguous axial images were obtained at 3 mm slice thickness through the abdomen and pelvis. Coronal and sagittal reconstructions at 3 mm slice thickness were performed.  No intravenous or oral contrast agents were administered.  FINDINGS: Please note that the evaluation of vessels, lymph nodes and organs is limited without intravenous contrast.  LOWER CHEST: In the interim since prior study on 09/13/2023, new large right-sided and moderate to large left-sided pleural effusions are present with associated ground-glass opacities and atelectatic changes in the lower lobes.  Heart is mildly enlarged without pericardial effusion.  Small hiatal hernia is present.  ABDOMEN:  LIVER: Within limits of noncontrast exam liver does not demonstrate any acute abnormalities.  BILE DUCTS: No intrahepatic or extrahepatic biliary dilatation is present.  GALLBLADDER: Gallbladder does not demonstrate any wall thickening or pericholecystic stranding.  PANCREAS: No new pancreatic ductal dilatation or pancreatic stranding is present.  SPLEEN: Spleen is unchanged in appearance to prior study, without evidence of new abnormalities.  ADRENAL GLANDS: Bilateral adrenal glands are unremarkable in appearance.  KIDNEYS AND URETERS: Native kidneys are atrophic without evidence of hydronephrosis or nephrolithiasis. A transplanted kidney is present in the left lower quadrant of the pelvis in the abdomen, and is not demonstrate any radiopaque stones or hydronephrosis.  PELVIS:  BLADDER: Bladder is nondistended and demonstrates mildly thickened wall.  REPRODUCTIVE ORGANS: No pelvic masses are present. Prostate appears to be surgically absent.  BOWEL: Stomach is unremarkable in appearance. Postsurgical changes of  partial small-bowel and large bowel resection are evident, without abnormal bowel dilatation or focal inflammatory changes present in the abdomen and pelvis. Several diverticula are present in the remaining sigmoid colon without evidence of acute diverticulitis. Appendix is not definitely identified, although no acute inflammatory changes are present in the right lower quadrant in the its expected location.  VESSELS: There is no aneurysmal dilatation of the abdominal aorta. The IVC appears normal. Scattered atherosclerotic plaques are present in the abdominal aorta.  PERITONEUM/RETROPERITONEUM/LYMPH NODES: Mild nonspecific mesenteric haziness is present in the central abdomen in the epigastric region, slightly more conspicuous compared to prior exam on 09/13/2023, without evidence of fat stranding, free fluid, or thick-walled collections. No free air is identified. No abdominopelvic lymphadenopathy is present.  ABDOMINAL WALL: Soft tissues of the abdominal wall are unremarkable in appearance.  BONES: No suspicious osseous lesions are identified.      1.  Large right-sided and moderate to large left-sided pleural effusions are present in the lungs bilaterally with mild associated ground-glass in the atelectatic changes in the lower lobes, new since prior exam on 09/13/2023. 2. Transplanted kidney is present in the left lower quadrant of the abdomen/pelvis, without evidence of hydronephrosis or nephrolithiasis. 3. Slight nonspecific mesenteric haziness in the epigastric region of the abdomen, new/increased since prior CT on 09/13/2023. Correlate with cardiac function. 4. Postsurgical changes of small and large bowel resection, without evidence of abnormal bowel dilatation or focal inflammatory wall thickening changes in the abdomen or pelvis. 5. Several diverticula are present in the sigmoid colon without evidence of acute diverticulitis. 6. Small hiatal hernia.   MACRO: None    XR chest 2 view    Result Date:  9/17/2023  Interpreted By:  BOOGIE ALBARADO MD MRN: [id number] Patient Name: KELVIN ROB  STUDY: Chest dated  9/17/2023.  INDICATION: cp sob  COMPARISON: Chest dated 09/13/2020.  ACCESSION NUMBER(S): [id number]  ORDERING CLINICIAN: ROXANNE LAZO  TECHNIQUE: PA and lateral radiograph of the chest.  FINDINGS: There are ill-defined bibasilar opacities most evident in the central right lower lung zone.  No pneumothorax or effusion is evident. The cardiomediastinal silhouette is  enlarged but similar to the prior exam.A stent is seen over the left axilla.      Ill-defined bibasilar opacities which could represent atelectasis and/or pneumonitis.    Electrocardiogram 12 Lead    Result Date: 9/14/2023  Please see ED Provider Note for formal interpretation Confirmed by Aamir Trent ([id number]) on 9/14/2023 4:03:48 AM    CT abdomen pelvis wo IV contrast    Result Date: 9/13/2023  Interpreted By:  FLEX TOLEDO MD and CHETAN BRUNNER MD MRN: [id number] Patient Name: KELVIN ROB  STUDY: CT ABDOMEN AND PELVIS WO CONTRAST;  9/13/2023 5:42 pm  INDICATION: L flank pain, h/o of kidney transplant, Lie Flat: Yes .  COMPARISON: CT chest abdomen pelvis 08/20/2023  ACCESSION NUMBER(S): [id number]  ORDERING CLINICIAN: RAMY DÍAZ  TECHNIQUE: CT of the abdomen and pelvis was performed. Contiguous axial images were obtained at 3 mm slice thickness through the abdomen and pelvis. Coronal and sagittal reconstructions at 3 mm slice thickness were performed.  No intravenous contrast was administered; positive oral contrast was given.  FINDINGS: Please note that the evaluation of vessels, lymph nodes and organs is limited without intravenous contrast.  LOWER CHEST: Resolution of small bilateral pleural effusions. Similar asymmetric enlargement of the left atrium and left ventricle. No significant pericardial effusion. Small sliding hiatal hernia is again noted.  ABDOMEN:  LIVER: The liver is normal in size without evidence of  focal liver lesions.  BILE DUCTS: The intrahepatic and extrahepatic ducts are not dilated.  GALLBLADDER: The gallbladder is nondistended and without evidence of radiopaque stones. There is layering hyperdense material within the gallbladder which may relate to biliary sludge.  PANCREAS: The pancreas appears unremarkable without evidence of ductal dilatation or masses.  SPLEEN: The spleen is normal in size without focal lesions.  ADRENAL GLANDS: Bilateral adrenal glands appear normal.  KIDNEYS AND URETERS: Redemonstration of severely atrophic kidneys with intraparenchymal calcifications. No hydroureteronephrosis or nephroureterolithiasis.  Similar appearance of the left iliac fossa transplant kidney with mild perinephric fat stranding. No hydronephrosis or nephroureterolithiasis of the transplant kidney.  PELVIS:  BLADDER: There is mild circumferential mural thickening of the urinary bladder with mild perivesical fat stranding.  REPRODUCTIVE ORGANS: No pelvic masses.  BOWEL: Small hiatal hernia. Postsurgical changes from right hemicolectomy with intact ileocolonic anastomosis in the left upper quadrant. An additional intact small bowel anastomosis is noted in the right upper quadrant. The small and large bowel are normal in caliber without wall thickening. Scattered colonic diverticula without evidence of acute diverticulitis.  VESSELS: Mild atherosclerotic changes of the abdominal aorta and its branches without aneurysmal dilatation. Severe calcifications of the bilateral internal iliac arteries are again noted.  PERITONEUM/RETROPERITONEUM/LYMPH NODES: No ascites or free air, no fluid collection.  No abdominopelvic lymphadenopathy is present.  ABDOMINAL WALL: Similar subcutaneous fat stranding along the left lower rectus abdominus muscle. Mild diffuse body wall edema.  BONES: No suspicious osseous lesions are identified. Degenerative discogenic disease is noted in the lower thoracic and lumbar spine, most  prominently at T10-11 with moderate disc height loss, degenerative endplate changes, and diffuse disc bulge which results moderate left and mild right neural foraminal narrowing.      1. Circumferential urinary bladder wall thickening with perivesical fat stranding which may be seen in the setting of cystitis. 2. Status post left iliac fossa renal transplant with similar mild perinephric fat stranding, nonspecific. 3. Similar mild anasarca. 4. Resolution of small bilateral pleural effusions. 5. Additional findings as above.  I personally reviewed the images/study and I agree with the findings as stated. This study was interpreted at [place], [place].  MACRO: None    CT head wo IV contrast    Result Date: 9/13/2023  Interpreted By:  LORRI MOBLEY MD MRN: [id number] Patient Name: KELVIN ROB  STUDY: CT HEAD WO CONTRAST;  9/13/2023 5:42 pm  INDICATION: headache, Lie Flat: Yes .  COMPARISON: None.  ACCESSION NUMBER(S): [id number]  ORDERING CLINICIAN: RAMY DÍAZ  TECHNIQUE: Noncontrast axial CT scan of head was performed. Angled reformats in brain and bone windows were generated. The images were reviewed in bone, brain, blood and soft tissue windows.  FINDINGS: CSF Spaces: The ventricles, sulci and basal cisterns are within normal limits. There is no extraaxial fluid collection.  Parenchyma: Mild degree of nonspecific white matter hypodensity is compatible with microangiopathy. The grey-white differentiation is intact. There is no mass effect or midline shift.  There is no intracranial hemorrhage.  Calvarium: The calvarium is unremarkable. Intracranial vascular calcifications.  Paranasal sinuses and mastoids: Visualized paranasal sinuses and mastoids are clear.      No acute intracranial hemorrhage or mass effect.  Mild degree of nonspecific white matter hypodensities compatible with microangiopathy.  MACRO: None    XR chest 2 view    Result Date: 9/13/2023  Interpreted By:   BALJEET HORTON MD STUDY: Chest Radiographs;  9/13/2023 10:32 AM.  INDICATION: Unspecified chest pain.  COMPARISON: Chest radiographs 5/6/2023.  ACCESSION NUMBER(S): [id number]  ORDERING CLINICIAN: JEFFREY VILLAGRAN MD  TECHNIQUE:  Frontal and lateral chest.  FINDINGS:  CARDIOMEDIASTINAL SILHOUETTE: Cardiomediastinal silhouette is normal in size and configuration.  LUNGS: Possible 8 mm pulmonary nodule left upper lobe, not definitively seen on prior exams. No consolidative airspace disease. No pleural effusion. No pneumothorax.  ABDOMEN: No remarkable upper abdominal findings.  BONES: No acute osseous changes.      Possible 8 mm pulmonary nodule left upper lobe, not definitively seen on prior exams. Follow-up chest CT advised.   Signed by Baljeet Horton MD        Physical Exam     Constitutional   General appearance: Alert     Pulmonary   Respiratory assessment: No respiratory distress, normal respiratory rhythm and effort.    Auscultation of Lungs: Clear bilateral breath sounds.   Cardiovascular   Auscultation of heart: Apical pulse normal, heart rate and rhythm normal, normal S1 and S2, no murmurs and no pericardial rub.    Exam for edema: No peripheral edema.   Abdomen   Abdominal Exam: tenderness, suprapubic discomfort  Musculoskeletal   Examination of gait: Normal.    Inspection of digits and nails: No clubbing or cyanosis of the fingernails.    Inspection/palpation of joints, bones and muscles: No joint swelling. Normal movement of all extremities.   Skin   Skin inspection: Normal skin color and pigmentation, normal skin turgor and no visible rash.   Neurologic   Cranial nerves: Nerves 2-12 were intact, no focal neuro defects.     Assessment/Plan      #Abdominal pain  Needed to increase Dilaudid  I did discuss with Dr. Pickering who said he will take a look at him later    #Suprapubic discomfort  Check bladder ultrasound    #Acute on chronic kidney injury  History of renal transplant  IV  fluids    #Hypertension  Stable    #Pancytopenia  Monitor numbers  No active signs of bleeding  Might need hematological work-up

## 2023-10-07 NOTE — CARE PLAN
The patient's goals for the shift include  pain control    The clinical goals for the shift include pain control  Problem: Pain - Adult  Goal: Verbalizes/displays adequate comfort level or baseline comfort level  10/7/2023 0255 by Asya Ness, RN  Outcome: Progressing  10/7/2023 0254 by Asya Ness, RN  Outcome: Progressing     Problem: Safety - Adult  Goal: Free from fall injury  10/7/2023 0255 by Asya Ness, RN  Outcome: Progressing  10/7/2023 0254 by Asya Ness, RN  Outcome: Progressing     Problem: Discharge Planning  Goal: Discharge to home or other facility with appropriate resources  10/7/2023 0255 by Asya Ness, RN  Outcome: Progressing  10/7/2023 0254 by Asya Ness RN  Outcome: Progressing     Problem: Chronic Conditions and Co-morbidities  Goal: Patient's chronic conditions and co-morbidity symptoms are monitored and maintained or improved  10/7/2023 0255 by Asya Ness, RN  Outcome: Progressing  10/7/2023 0254 by Asya Ness RN  Outcome: Progressing     Problem: Diabetes  Goal: Achieve decreasing blood glucose levels by end of shift  10/7/2023 0255 by Asya Ness, RN  Outcome: Progressing  10/7/2023 0254 by Asya Ness, RN  Outcome: Progressing  Goal: Increase stability of blood glucose readings by end of shift  10/7/2023 0255 by Asya Ness, RN  Outcome: Progressing  10/7/2023 0254 by Asya Ness, RN  Outcome: Progressing  Goal: Decrease in ketones present in urine by end of shift  10/7/2023 0255 by Asya Ness, RN  Outcome: Progressing  10/7/2023 0254 by Asya Ness, RN  Outcome: Progressing  Goal: Maintain electrolyte levels within acceptable range throughout shift  10/7/2023 0255 by Asya Ness, RN  Outcome: Progressing  10/7/2023 0254 by Asya Ness, RN  Outcome: Progressing  Goal: Maintain glucose levels >70mg/dl to <250mg/dl throughout shift  10/7/2023 0255 by Asya Ness, RN  Outcome: Progressing  10/7/2023 0254 by Asya Ness, RN  Outcome: Progressing  Goal: No changes in  neurological exam by end of shift  10/7/2023 0255 by Asya Ness RN  Outcome: Progressing  10/7/2023 0254 by Asya Ness, RN  Outcome: Progressing  Goal: Learn about and adhere to nutrition recommendations by end of shift  10/7/2023 0255 by Asya Ness, RN  Outcome: Progressing  10/7/2023 0254 by Asya Ness RN  Outcome: Progressing  Goal: Vital signs within normal range for age by end of shift  10/7/2023 0255 by Asya Ness, RN  Outcome: Progressing  10/7/2023 0254 by Asya Ness, RN  Outcome: Progressing  Goal: Increase self care and/or family involovement by end of shift  10/7/2023 0255 by Asya Ness, RN  Outcome: Progressing  10/7/2023 0254 by Asya Ness RN  Outcome: Progressing  Goal: Receive DSME education by end of shift  10/7/2023 0255 by Asya Ness, RN  Outcome: Progressing  10/7/2023 0254 by Asya Ness RN  Outcome: Progressing

## 2023-10-08 LAB
ANION GAP SERPL CALC-SCNC: 8 MMOL/L (ref 10–20)
BUN SERPL-MCNC: 17 MG/DL (ref 6–23)
CALCIUM SERPL-MCNC: 9.8 MG/DL (ref 8.6–10.3)
CHLORIDE SERPL-SCNC: 105 MMOL/L (ref 98–107)
CO2 SERPL-SCNC: 25 MMOL/L (ref 21–32)
CREAT SERPL-MCNC: 2.56 MG/DL (ref 0.5–1.3)
ERYTHROCYTE [DISTWIDTH] IN BLOOD BY AUTOMATED COUNT: 15.4 % (ref 11.5–14.5)
GFR SERPL CREATININE-BSD FRML MDRD: 27 ML/MIN/1.73M*2
GLUCOSE BLD MANUAL STRIP-MCNC: 142 MG/DL (ref 74–99)
GLUCOSE BLD MANUAL STRIP-MCNC: 152 MG/DL (ref 74–99)
GLUCOSE BLD MANUAL STRIP-MCNC: 152 MG/DL (ref 74–99)
GLUCOSE BLD MANUAL STRIP-MCNC: 203 MG/DL (ref 74–99)
GLUCOSE SERPL-MCNC: 168 MG/DL (ref 74–99)
HCT VFR BLD AUTO: 23.6 % (ref 41–52)
HGB BLD-MCNC: 8 G/DL (ref 13.5–17.5)
MCH RBC QN AUTO: 27.6 PG (ref 26–34)
MCHC RBC AUTO-ENTMCNC: 33.9 G/DL (ref 32–36)
MCV RBC AUTO: 81 FL (ref 80–100)
NRBC BLD-RTO: 0 /100 WBCS (ref 0–0)
PLATELET # BLD AUTO: 90 X10*3/UL (ref 150–450)
PMV BLD AUTO: 9.1 FL (ref 7.5–11.5)
POTASSIUM SERPL-SCNC: 4.4 MMOL/L (ref 3.5–5.3)
RBC # BLD AUTO: 2.9 X10*6/UL (ref 4.5–5.9)
SODIUM SERPL-SCNC: 134 MMOL/L (ref 136–145)
WBC # BLD AUTO: 2.3 X10*3/UL (ref 4.4–11.3)

## 2023-10-08 PROCEDURE — 2500000002 HC RX 250 W HCPCS SELF ADMINISTERED DRUGS (ALT 637 FOR MEDICARE OP, ALT 636 FOR OP/ED): Performed by: NURSE PRACTITIONER

## 2023-10-08 PROCEDURE — 2500000005 HC RX 250 GENERAL PHARMACY W/O HCPCS: Performed by: NURSE PRACTITIONER

## 2023-10-08 PROCEDURE — 2500000001 HC RX 250 WO HCPCS SELF ADMINISTERED DRUGS (ALT 637 FOR MEDICARE OP): Performed by: NURSE PRACTITIONER

## 2023-10-08 PROCEDURE — 96372 THER/PROPH/DIAG INJ SC/IM: CPT | Performed by: NURSE PRACTITIONER

## 2023-10-08 PROCEDURE — 2500000004 HC RX 250 GENERAL PHARMACY W/ HCPCS (ALT 636 FOR OP/ED): Performed by: FAMILY MEDICINE

## 2023-10-08 PROCEDURE — 36415 COLL VENOUS BLD VENIPUNCTURE: CPT | Performed by: INTERNAL MEDICINE

## 2023-10-08 PROCEDURE — 99232 SBSQ HOSP IP/OBS MODERATE 35: CPT | Performed by: INTERNAL MEDICINE

## 2023-10-08 PROCEDURE — 2500000001 HC RX 250 WO HCPCS SELF ADMINISTERED DRUGS (ALT 637 FOR MEDICARE OP): Performed by: FAMILY MEDICINE

## 2023-10-08 PROCEDURE — 2500000004 HC RX 250 GENERAL PHARMACY W/ HCPCS (ALT 636 FOR OP/ED): Performed by: NURSE PRACTITIONER

## 2023-10-08 PROCEDURE — 85027 COMPLETE CBC AUTOMATED: CPT | Performed by: INTERNAL MEDICINE

## 2023-10-08 PROCEDURE — 80048 BASIC METABOLIC PNL TOTAL CA: CPT | Performed by: INTERNAL MEDICINE

## 2023-10-08 PROCEDURE — 2500000004 HC RX 250 GENERAL PHARMACY W/ HCPCS (ALT 636 FOR OP/ED): Performed by: INTERNAL MEDICINE

## 2023-10-08 PROCEDURE — 1100000001 HC PRIVATE ROOM DAILY

## 2023-10-08 PROCEDURE — 82947 ASSAY GLUCOSE BLOOD QUANT: CPT

## 2023-10-08 RX ADMIN — INSULIN LISPRO 2 UNITS: 100 INJECTION, SOLUTION INTRAVENOUS; SUBCUTANEOUS at 14:23

## 2023-10-08 RX ADMIN — PREDNISONE 5 MG: 5 TABLET ORAL at 09:00

## 2023-10-08 RX ADMIN — HYDRALAZINE HYDROCHLORIDE 25 MG: 25 TABLET ORAL at 20:30

## 2023-10-08 RX ADMIN — HYDROMORPHONE HYDROCHLORIDE 0.4 MG: 1 INJECTION, SOLUTION INTRAMUSCULAR; INTRAVENOUS; SUBCUTANEOUS at 14:23

## 2023-10-08 RX ADMIN — PRAVASTATIN SODIUM 40 MG: 40 TABLET ORAL at 21:00

## 2023-10-08 RX ADMIN — INSULIN LISPRO 1 UNITS: 100 INJECTION, SOLUTION INTRAVENOUS; SUBCUTANEOUS at 17:58

## 2023-10-08 RX ADMIN — BENZONATATE 100 MG: 100 CAPSULE ORAL at 16:18

## 2023-10-08 RX ADMIN — TACROLIMUS 3 MG: 1 CAPSULE ORAL at 17:59

## 2023-10-08 RX ADMIN — PANTOPRAZOLE SODIUM 40 MG: 40 TABLET, DELAYED RELEASE ORAL at 06:27

## 2023-10-08 RX ADMIN — Medication 50 MCG: at 09:00

## 2023-10-08 RX ADMIN — AMLODIPINE BESYLATE 10 MG: 10 TABLET ORAL at 09:00

## 2023-10-08 RX ADMIN — TACROLIMUS 3 MG: 1 CAPSULE ORAL at 06:27

## 2023-10-08 RX ADMIN — FERROUS SULFATE TAB 325 MG (65 MG ELEMENTAL FE) 65 MG OF IRON: 325 (65 FE) TAB at 09:00

## 2023-10-08 RX ADMIN — FERROUS SULFATE TAB 325 MG (65 MG ELEMENTAL FE) 65 MG OF IRON: 325 (65 FE) TAB at 20:29

## 2023-10-08 RX ADMIN — AZATHIOPRINE 50 MG: 50 TABLET ORAL at 09:00

## 2023-10-08 RX ADMIN — LIDOCAINE 1 PATCH: 4 PATCH TOPICAL at 09:00

## 2023-10-08 RX ADMIN — HYDROMORPHONE HYDROCHLORIDE 0.4 MG: 1 INJECTION, SOLUTION INTRAMUSCULAR; INTRAVENOUS; SUBCUTANEOUS at 04:06

## 2023-10-08 RX ADMIN — CARVEDILOL 25 MG: 25 TABLET, FILM COATED ORAL at 20:31

## 2023-10-08 RX ADMIN — HYDROMORPHONE HYDROCHLORIDE 0.4 MG: 1 INJECTION, SOLUTION INTRAMUSCULAR; INTRAVENOUS; SUBCUTANEOUS at 09:29

## 2023-10-08 RX ADMIN — GABAPENTIN 100 MG: 100 CAPSULE ORAL at 20:30

## 2023-10-08 RX ADMIN — SODIUM CHLORIDE 75 ML/HR: 9 INJECTION, SOLUTION INTRAVENOUS at 04:09

## 2023-10-08 RX ADMIN — INSULIN GLARGINE 10 UNITS: 100 INJECTION, SOLUTION SUBCUTANEOUS at 14:24

## 2023-10-08 RX ADMIN — HYDRALAZINE HYDROCHLORIDE 25 MG: 25 TABLET ORAL at 09:00

## 2023-10-08 RX ADMIN — CARVEDILOL 25 MG: 25 TABLET, FILM COATED ORAL at 09:00

## 2023-10-08 RX ADMIN — SODIUM CHLORIDE 75 ML/HR: 9 INJECTION, SOLUTION INTRAVENOUS at 16:21

## 2023-10-08 RX ADMIN — BENZONATATE 100 MG: 100 CAPSULE ORAL at 09:00

## 2023-10-08 RX ADMIN — HYDRALAZINE HYDROCHLORIDE 25 MG: 25 TABLET ORAL at 16:18

## 2023-10-08 ASSESSMENT — COGNITIVE AND FUNCTIONAL STATUS - GENERAL
MOBILITY SCORE: 24
DAILY ACTIVITIY SCORE: 24

## 2023-10-08 ASSESSMENT — PAIN SCALES - PAIN ASSESSMENT IN ADVANCED DEMENTIA (PAINAD): BREATHING: NORMAL

## 2023-10-08 ASSESSMENT — PAIN - FUNCTIONAL ASSESSMENT: PAIN_FUNCTIONAL_ASSESSMENT: 0-10

## 2023-10-08 ASSESSMENT — PAIN SCALES - GENERAL
PAINLEVEL_OUTOF10: 8
PAINLEVEL_OUTOF10: 7
PAINLEVEL_OUTOF10: 4

## 2023-10-08 ASSESSMENT — PAIN SCALES - WONG BAKER: WONGBAKER_NUMERICALRESPONSE: HURTS EVEN MORE

## 2023-10-08 NOTE — PROGRESS NOTES
Manolo Bashir is a 68 y.o. male     Continues to have abdominal discomfort  Unclear etiology    Reviewed imaging studies  CT abdomen and pelvis was unremarkable  Vascular studies did not show any mesenteric ischemia  Surgery and GI have seen the patient and no further recommendations have been offered  However the patient continues to be in discomfort    Review of Systems     Constitutional: In distress  Cardiovascular: the heart rate was not slow, the heart rate was not fast, no chest pain, no palpitations, no intermittent leg claudication and no lower extremity edema.   Respiratory: no cough, wheezing or shortness of breath at rest or exertion  Gastrointestinal: Abdominal pain  Musculoskeletal: no arthralgias, no myalgias, no back pain, no joint swelling, no joint stiffness, no limb pain and no limb swelling.   Integumentary: no skin rashes, no skin lesions, no itching, no skin wound and no dry skin.   Neurological: no headache, no confusion, no numbness, no dizziness, no tingling and no fainting.   All other systems have been reviewed and are negative for complaint.       Vitals:    10/08/23 1128   BP: 164/73   Pulse: 71   Resp: 18   Temp: 36.7 °C (98.1 °F)   SpO2: 100%        Scheduled medications  amLODIPine, 10 mg, oral, Daily  azaTHIOprine, 50 mg, oral, Daily  benzonatate, 100 mg, oral, TID  carvedilol, 25 mg, oral, BID  cholecalciferol, 50 mcg, oral, Daily  ferrous sulfate, 65 mg of iron, oral, BID  gabapentin, 100 mg, oral, Nightly  heparin, 5,000 Units, subcutaneous, q8h  hydrALAZINE, 25 mg, oral, TID  insulin glargine, 10 Units, subcutaneous, Daily  insulin lispro, 0-5 Units, subcutaneous, TID with meals  lidocaine, 1 patch, transdermal, Daily  [Held by provider] losartan, 25 mg, oral, Daily  pantoprazole, 40 mg, oral, Daily before breakfast  pravastatin, 40 mg, oral, Nightly  predniSONE, 5 mg, oral, q AM  tacrolimus, 3 mg, oral, q12h ZOË      Continuous medications  sodium chloride 0.9%, 75 mL/hr, Last  Rate: 75 mL/hr (10/08/23 0409)    PRN medications  PRN medications: acetaminophen, dextrose, docusate sodium, glucagon, HYDROmorphone, ondansetron ODT    Lab Review   Results from last 7 days   Lab Units 10/08/23  0626 10/07/23  0535 10/05/23  1112   WBC AUTO x10*3/uL 2.3* 2.5* 2.4*   HEMOGLOBIN g/dL 8.0* 8.7* 7.9*   HEMATOCRIT % 23.6* 25.6* 23.2*   PLATELETS AUTO x10*3/uL 90* 98* 85*       Results from last 7 days   Lab Units 10/08/23  0626 10/07/23  0535 10/05/23  0813 10/04/23  1132 10/02/23  1329   SODIUM mmol/L 134* 134* 134*   < > 132*   POTASSIUM mmol/L 4.4 4.2 4.1   < > 4.6   CHLORIDE mmol/L 105 103 103   < > 102   CO2 mmol/L 25 24 27   < > 25   BUN mg/dL 17 19 18   < > 21   CREATININE mg/dL 2.56* 2.61* 2.55*   < > 2.49*   CALCIUM mg/dL 9.8 10.0 10.1   < > 9.6   PROTEIN TOTAL g/dL  --   --  5.9*  --  5.5*   BILIRUBIN TOTAL mg/dL  --   --  0.6  --  0.7   ALK PHOS U/L  --   --  64  --  48   ALT U/L  --   --  12  --  9*   AST U/L  --   --  21  --  17   GLUCOSE mg/dL 168* 149* 149*   < > 127*    < > = values in this interval not displayed.              XR lumbar spine complete 4+ views    Result Date: 10/6/2023  Interpreted By:  Renaldo Cunningham, STUDY: XR LUMBAR SPINE COMPLETE 4+ VIEWS;  10/6/2023 11:27 am   INDICATION: Signs/Symptoms:lower back pain.   COMPARISON: None.   ACCESSION NUMBER(S): [id number]   ORDERING CLINICIAN: BENNY MUNSON   FINDINGS: Multiple views of the  lumbar spine are obtained. Alignment is intact. The vertebral body heights and disc heights are preserved. Endplate sclerosis and osteophytes are seen throughout the spine. No acute fracture-dislocation.       Minimal discogenic degenerative changes as described.     Signed by: Renaldo Cunningham 10/6/2023 11:49 AM Dictation workstation:   [id number]    XR thoracic spine 3 views    Result Date: 10/6/2023  Interpreted By:  Renaldo Cunningham, STUDY: XR THORACIC SPINE 3 VIEWS;  10/6/2023 11:27 am   INDICATION: Signs/Symptoms:lower back.   COMPARISON:  None.   ACCESSION NUMBER(S): [id number]   ORDERING CLINICIAN: BENNY MUNSON   FINDINGS: Multiple views of the  thoracic spine are obtained. Apparent osteopenia. Mild to moderate discogenic degenerative changes. Minimal anterior loss of height of 2 adjacent midthoracic spine vertebral bodies unchanged.   Endplate irregularity and slight widening of the disc space at T6-C7 and T10/T11. Underlying discitis/osteomyelitis not excluded. Correlate clinically and follow-up MRI as clinically warranted.       Apparent osteopenia and discogenic degenerative changes.     Endplate irregularity and slight widening of the disc space at T6-C7 and T10/T11. Underlying discitis/osteomyelitis not excluded. Correlate clinically and follow-up MRI as clinically warranted.       Signed by: Renaldo Cunningham 10/6/2023 11:45 AM Dictation workstation:   [id number]    CT abdomen pelvis wo IV contrast    Result Date: 10/6/2023  Interpreted By:  Renaldo Cunningham, STUDY: CT ABDOMEN PELVIS WO IV CONTRAST;  10/6/2023 9:27 am   INDICATION: Signs/Symptoms:abdominal pain.   COMPARISON: 10/02/2023   ACCESSION NUMBER(S): [id number]   ORDERING CLINICIAN: BENNY MUNSON   TECHNIQUE: CT of the abdomen and pelvis was performed. Contiguous axial images were obtained at 3 mm slice thickness through the abdomen and pelvis. Coronal and sagittal reconstructions at 3 mm slice thickness were performed.  No intravenous contrast was administered; positive oral contrast was given.   FINDINGS: Please note that the evaluation of vessels, lymph nodes and organs is limited without intravenous contrast.   LOWER CHEST: The visualized lung base is remarkable for bibasilar pleural effusions and cardiomegaly. These are similar to the prior exam.   ABDOMEN:   LIVER: The liver is grossly unremarkable. No definite focal liver lesions.   BILE DUCTS: No bile duct dilatation.   GALLBLADDER: The gallbladder is not clearly identified and per history surgically absent. Minimal fluid and linear  area of high density in the gallbladder fossa and similar to the prior exam..   PANCREAS: Unremarkable pancreas within the limits of this unenhanced study.   SPLEEN: Grossly unremarkable.   ADRENAL GLANDS: No adrenal masses.   KIDNEYS AND URETERS: A trophy of the native kidneys in.. No evidence of hydronephrosis. Transplant kidney within the left aspect of the pelvis. No hydronephrosis of the transplant kidney. PELVIS:   BLADDER: The bladder is incompletely distended and evaluated.   REPRODUCTIVE ORGANS: No definite masses.   BOWEL: No evidence of bowel obstruction. No focal inflammatory changes. Postsurgical changes and sutures involving a loop of colon in the upper anterior abdomen. Also sutures in the loop of small bowel in the right mid abdomen anteriorly.   VESSELS: Normal caliber aorta. Small vessel calcification throughout.   PERITONEUM/RETROPERITONEUM/LYMPH NODES: Free pelvic fluid. No significant retroperitoneal adenopathy.   ABDOMINAL WALL: Grossly unremarkable.   BONES: Discogenic degenerative changes.       1. Nonspecific free pelvic fluid. 2. Interval cholecystectomy. 3. Persistent small bibasilar pleural effusions. 4. Atrophic native kidneys with a transplant kidney within the left aspect of the pelvis.   MACRO: None   Signed by: Renaldo Cunningham 10/6/2023 10:00 AM Dictation workstation:   [id number]    Vascular US mesenteric artery duplex complete    Result Date: 10/6/2023             [place] [id number]   Tel [phone] and Fax [phone]  Vascular Lab Report Mesenteric Ultrasound  Patient Name:      KELVIN ROB      Alma Delia Physician:  [id number] Calvin Mohan DO Study Date:        10/5/2023           Ordering Provider:  [id number] SIDDIQUI MRN/PID:           [id number]            Technologist:       Kaur Hightower RVT Accession#:        [id number]        Technologist 2: Date of Birth/Age:  1955 / 68      Encounter#:         [id number]                    years Gender:            M Admission Status:  Inpatient           Location Performed: [facility]  Diagnosis/ICD: Lower abdominal/groin pain-R10.30 Indication:  CONCLUSIONS: Mesenteric: The celiac, hepatic, splenic and SMA appear widely patent with no evidence of stenosis. The JAMIE appears widely patent. The patient was NPO for this study. There is shadowing artifact noted at the origin of the celiac artery. The celiac artery with inspiration demonstrates a velocity of 98 cm/s.  Imaging & Doppler Findings:  Aorta PSV         118 cm/s Celiac Origin  cm/s Celiac Prox PSV   104 cm/s Celiac Mid PSV    117 cm/s Celiac Dist PSV   144 cm/s SMA Origin PSV    119 cm/s SMA Prox PSV      136 cm/s SMA Mid PSV       123 cm/s SMA Dist PSV      136 cm/s JAMIE PSV           107 cm/s JAMIE Prox PSV      116 cm/s Hepatic PSV       104 cm/s Splenic PSV       141 cm/s   [id number] Calvin Mohan DO Electronically signed by [id number] Calvin Mohan DO on 10/6/2023 at 8:37:34 AM  ** Final **     CT abdomen pelvis wo IV contrast    Result Date: 10/2/2023  Interpreted By:  Constantino Jin, STUDY: CT ABDOMEN PELVIS WO IV CONTRAST; 10/2/2023 11:28 am   INDICATION: Signs/Symptoms:s/p lap eduarda 1 week ago, now having RUQ pain, N/V, h/o renal transplant.   COMPARISON: CT abdomen and pelvis dated 09/17/2023   ACCESSION NUMBER(S): [id number]   ORDERING CLINICIAN: CATALINO PENNINGTON   TECHNIQUE: Contiguous axial images were obtained at 3mm slice thickness through the abdomen and pelvis without intravenous contrast administration. Coronal and sagittal reconstructions at 3 mm slice thickness were performed. Intravenous contrast was not given per the referring physician's request.   FINDINGS: The study is severely limited by the lack of intravenous contrast.   LOWER CHEST: Evaluation of the visualized lung bases demonstrate small bilateral pleural effusions, larger on the right than on the  left, similar to the prior study. Dependent airspace consolidations are seen bilaterally, and may represent atelectasis and/or pneumonia. The heart is at the upper limits of normal for size, similar to the prior study.   ABDOMEN:   LIVER: The liver is within normal limits for appearance, without evidence of focal masses.   BILE DUCTS: No definite intra or extrahepatic biliary dilatation is identified.   GALLBLADDER: The gallbladder is surgically absent.   PANCREAS: The pancreas is within normal limits for appearance, without evidence of focal masses.   SPLEEN: The spleen is within normal limits for size. No focal splenic mass is seen.   ADRENAL GLANDS: No definite adrenal nodules or masses are seen bilaterally.   KIDNEYS AND URETERS: The native kidneys are severely atrophic bilaterally. A transplanted kidney is seen in the left pelvic fossa. There is no hydronephrosis, hydroureter or renal/ ureteral calculus identified. No definite focal renal mass is seen, though evaluation is severely limited by the lack of intravenous contrast.   PELVIS:   BLADDER: The bladder is decompressed.   REPRODUCTIVE ORGANS: The prostate is within normal limits for appearance.   BOWEL: The patient is status post right hemicolectomy with ileal colonic anastomosis. The residual colon and small bowel are within normal limits for course, caliber and appearance, without evidence of wall thickening or obstruction. No CT evidence of acute diverticulitis is seen.   VESSELS: Scattered atherosclerotic calcifications are seen throughout the infrarenal abdominal aorta and iliac arteries. The abdominal aorta is within normal limits for course, caliber and appearance, without evidence of aneurysm.   PERITONEUM/RETROPERITONEUM/LYMPH NODES: There is no free intraperitoneal air or free fluid identified. No gross mesenteric or retroperitoneal lymphadenopathy is identified.   BONE AND SOFT TISSUE: There is no evidence of acute fracture identified. No  evidence of abdominal wall mass or hernia is identified.       1. Postoperative changes, as above. 2. No evidence of bowel obstruction, free intraperitoneal air or abnormal intra-abdominal fluid collection. 3. Bilateral pleural effusions and basilar airspace consolidations, as above.   MACRO: None   Signed by: Constantino Jin 10/2/2023 11:46 AM Dictation workstation:   [id number]    XR chest 1 view    Result Date: 9/23/2023  Interpreted By:  CONSTANTINO JIN MD MRN: [id number] Patient Name: KELVIN ROB  STUDY: CHEST 1 VIEW  9/23/2023 2:05 pm  INDICATION: sob  COMPARISON: 09/17/2023  ACCESSION NUMBER(S): [id number]  ORDERING CLINICIAN: BOBBI PALMA  TECHNIQUE: A single AP portable radiograph of the chest was obtained.  FINDINGS: Mild diffuse interstitial infiltrates are seen bilaterally, and may represent edema and/or pneumonia. No pneumothorax is identified. The cardiac silhouette is within normal limits for size.      Diffuse interstitial infiltrates bilaterally, as above. Clinical correlation and continued follow-up until clearing is recommended.  MACRO: None.    NM liver spleen    Result Date: 9/22/2023  Interpreted By:  TRAVIS BOSE MD MRN: [id number] Patient Name: KELVIN ROB  STUDY: BILIARY WITH EF W OR W/O CCK;  9/22/2023 4:25 pm  INDICATION: nausea/vomiting, sludge in gallbladder on CT .  COMPARISON: CT of abdomen and pelvis on 09/17/2023  ACCESSION NUMBER(S): [id number]  ORDERING CLINICIAN: NIRANJAN CRUZ  TECHNIQUE: DIVISION OF NUCLEAR MEDICINE HEPATOBILIARY SCAN (HIDA), QUANTITATIVE  The patient received an intravenous dose of 6.0 mCi of Tc-99m mebrofenin (Choletec).  Sequential images of the upper abdomen were then acquired over the next 60 minutes.  An intravenous infusion of the cholecystokinin (CCK) analogue, Sincalide, was then administered followed by an additional period of imaging.  Computer quantification of gallbladder emptying was also performed  FINDINGS: There is prompt accumulation of activity  within the liver and normal subsequent excretion via the biliary ductal system into the small bowel.  The gallbladder first visualizes at about  22 minutes after radiopharmaceutical injection and progressively fills.  After Sincalide administration, there is no contraction of the gallbladder. The gallbladder ejection fraction is calculated to be  0 % (normal above 38%).      1. This study demonstrates patency of cystic duct and common bile duct, no evidence of acute cholecystitis. 2. No contraction of the gallbladder after CCK administration, suggestive of biliary dyskinesia.  I personally reviewed the images/study. This study was interpreted at [place], [place].    XR abdomen 1 view    Result Date: 9/22/2023  Interpreted By:  AARTI MONTEZ MD MRN: [id number] Patient Name: KELVIN ROB  STUDY: ABDOMEN AP VIEW;  9/22/2023 4:22 pm  INDICATION: n/v .  COMPARISON: CT scan from 09/17/2023. KUB from 08/22/2023.  ACCESSION NUMBER(S): [id number]  ORDERING CLINICIAN: BENNY MUNSON  TECHNIQUE:   2 supine views of the abdomen and pelvis were obtained.  FINDINGS: There was   a colonic anastomosis in the region of the left side of the transverse colon, and a presume small-bowel anastomosis in the mid right abdomen. There is mild-to-moderate stool and gas in the left transverse colon down through the rectum. There is mild gas in nondilated loops of small bowel in the central abdomen.. Surgical clips in the left pelvis consistent with known left pelvic renal allograft. There was no gross organomegaly. Arterial calcifications in the iliac arteries. No destructive bone lesion.  Sclerotic arthritic changes in both SI joints. The extreme lung bases were clear. Cardiomegaly.      Previous intestinal surgery.  Retained colonic stool and gas as described. Mild small-bowel gas without suspicious small bowel dilatation.  MACRO: None    US chest    Result Date: 9/19/2023  Interpreted By:  MICHELE  GIANCARLO GOLD MRN: [id number] Patient Name: KELVIN ROB  STUDY: US CHEST; Right;  9/19/2023 2:20 pm  INDICATION: thoracentesis .  COMPARISON: None.  ACCESSION NUMBER(S): [id number]  ORDERING CLINICIAN: RAIMUNDO LEOS  TECHNIQUE: Limited ultrasound of the chest for thoracentesis  FINDINGS: Focused ultrasound examination of the right chest demonstrates scant pleural effusion.      Scant Right pleural effusion, no thoracentesis indicated.  MACRO: None    MR lumbar spine w and wo IV contrast    Result Date: 9/19/2023  Interpreted By:  REY TAYLOR MD and DRE GRANT MD MRN: [id number] Patient Name: KELVIN ROB  STUDY: MR L-SPINE WO/W CONTRAST;  9/19/2023 9:59 am  INDICATION: intractable back pain - hx of prostate cancer, DM2 rule out infection, mets .  COMPARISON: CT L-spine 09/17/2023, CT abdomen pelvis 09/17/2023  ACCESSION NUMBER(S): [id number]  ORDERING CLINICIAN: RADHA BALDERRAMA  TECHNIQUE: Sagittal T1, T2, STIR, axial T1 and T2 weighted images of the lumbar spine were acquired. Following administration of 15 mL Dotarem, additional T1 weighted images were obtained.  FINDINGS: For labeling purposes, the last intervertebral body disc space is labeled L5-S1.  Vertebrae: The height, alignment, and signal of the lumbar vertebral bodies are preserved.  Intervertebral Discs: Multilevel intervertebral body disc desiccation. Otherwise, vertebral body heights are overall well-maintained.  Conus medullaris: The lower thoracic cord appears unremarkable. The conus medullaris terminates appropriately at L1.  T12-L1: There is no significant central canal or neural foraminal stenosis.  L1-2: There is no significant central canal or neural foraminal stenosis. Mild ligamentum flavum hypertrophy and facet arthropathy with posterior indentation of the thecal sac.  L2-3: There is no significant central canal or neural foraminal stenosis. Moderate ligamentum flavum hypertrophy and facet arthropathy with posterior indentation of the  thecal sac.  L3-4: There is a focus of abnormal signal in the right-sided L4 pedicle best appreciated on parasagittal T2 weighted image 7/23. There is no appreciable degenerative change in the adjacent facet joint. No associated contrast enhancement. Consider incidental osseous hemangioma, stress fracture as well as osteoid osteoma no measurable central canal stenosis. Moderate ligamentum flavum hypertrophy and facet arthropathy with posterior indentation thecal sac. There is resultant mild bilateral neural foraminal stenosis.  L4-5: There is no significant central canal or neural foraminal stenosis. Moderate ligamentum flavum hypertrophy and facet arthropathy.  L5-S1: There is no significant central canal or neural foraminal stenosis. There is a focus of sclerosis in the S2 vertebral body that can not be further characterized and likely represents a bone island. No associated contrast enhancement.  Bilateral native kidneys are atrophic. There is partial visualization of a left iliac fossa renal transplant.      1. Nonspecific focus of abnormal signal in the right-sided pedicle of L4 can not be further characterized but is consistent with the presence of osseous hemangioma, stress fracture or osteoid osteoma. No evidence of discitis or osteomyelitis. 2. Bone island in the S2 vertebral body 3. No evidence of metastatic disease to the lumbar spine. 4. Mild-to-moderate multilevel degenerative change of the lumbar spine with ligamentum flavum hypertrophy, facet arthropathy, and mild neural foraminal stenosis at L3-L4.  I personally reviewed the images/study, and I agree with the findings as stated above. This study was interpreted at [place], [place].    US right upper quadrant    Result Date: 9/19/2023  Interpreted By:  PALLAVI MCGRAW MD STUDY: Right Upper Quadrant Ultrasound; 9/19/2023 6:35 AM  INDICATION: Biliary colic.  COMPARISON: CT AP 9/17/23, 9/13/23 and XR  abdomen 8/22/23.  ACCESSION NUMBER(S): [id number]  ORDERING CLINICIAN: RADHA BALDERRAMA PA-C  TECHNIQUE: Ultrasound of the Right Upper Quadrant.  FINDINGS: LIVER: The liver appears sonographically normal.  Normal echogenicity.  The liver appears to be homogeneous with no focal lesion   GALLBLADDER: The gallbladder : the gallbladder is mildly contracted.  There is no focal tenderness to gallbladder palpation.  No evidence of stone disease.  The gallbladder wall appears to be mildly prominent but I think this is in large part related to the fact that this is a contracted gallbladder  BILE DUCTS: The common bile duct measures 0.6 cm.  There is no intrahepatic biliary dilatation.   PANCREAS: The pancreas : The pancreas appears to be sonographically normal--- suboptimal visualization of the pancreatic tail--- there may be one or 2 punctate calcifications in the pancreas.  Correlate with any previous history of pancreatitis  RIGHT KIDNEY: The right kidney is surgically absent.  One can see a right pleural effusion and there is a trace amount of free fluid in the abdomen        Suboptimal visualization of the pancreas but what we see appears normal------ there may be one or 2 punctate calcifications of the pancreas.  Correlate with any previous history of pancreatitis  Partially contracted gallbladder but no gross evidence of stone disease or pericholecystic fluid -----no tenderness to gallbladder palpation  Surgically absent kidney  No biliary ductal dilatation   Signed by Zac Dillon MD    CT lumbar spine wo IV contrast    Result Date: 9/17/2023  Interpreted By:  BRITTANY VASQUEZ MD MRN: [id number] Patient Name: KELVIN ROB  STUDY: CT L-SPINE WO CONTRAST  9/17/2023 5:10 pm  INDICATION: diffuse and worsening abd pain, history of renal transplant  COMPARISON: None.  ACCESSION NUMBER(S): [id number]  ORDERING CLINICIAN: ROXANNE LAZO  TECHNIQUE: Axial CT images of the lumbar spine are obtained. Axial,  coronal and sagittal reconstructions are provided for review.  FINDINGS: There are 5 lumbar type non rib-bearing vertebral bodies, with the lowest well-formed intervertebral disc space labeled L5-S1.  Lumbar vertebral alignment is maintained, without significant spondylolisthesis.  Lumbar vertebral body heights are intact, without evidence of compression fractures, although multilevel insufficiency endplate changes with associated Schmorl's nodes are present, most conspicuous along the superior endplates of L1, L2 and L3 and inferior endplate of L3.  Posterior elements of the lumbar spine do not demonstrate any acute abnormalities. No displaced spinous or transverse process fracture or abnormal intra spinous distance widening is present. Low corticated ossific fragment in the expected location of the transverse processes are of T12 are favored to be non fused ossification centers.  Facet joints are preserved without evidence of perching or subluxation.  Mild intervertebral disc height loss is present at L2-L3 and L1-L2.  Although the exam is not optimized to assess the spinal canal, there is likely at least mild-to-moderate spinal canal narrowing possible at the level of L4-L5 due to bulging disc and ligamentum flavum thickening.  Mild neural foraminal stenosis is present at the levels of L3-L4, L4-L5 and L5-S1 due to bulging disc and endplate spurring, worst at L3-L4 on the left.  Paraspinal musculature does not demonstrate any acute abnormalities.      1.  No evidence of acute trauma or high-grade stenosis in the lumbar spine. 2. Multilevel degenerative changes of the lumbar spine, with likely mild spinal canal narrowing present at the level of L4-L5 and mild-to-moderate neural foraminal stenosis present at the levels of L3-L4 due to combination of bulging disc, endplate spurring and hypertrophic facet changes.  MACRO: None    CT abdomen pelvis wo IV contrast    Result Date: 9/17/2023  Interpreted By:   BRITTANY VASQUEZ MD MRN: [id number] Patient Name: KELVIN ROB  STUDY: CT ABDOMEN AND PELVIS WO CONTRAST;  9/17/2023 5:10 pm  INDICATION: diffuse and worsening abd pain, history of renal transplant .  COMPARISON: CT abdomen and pelvis dated 09/13/2023  ACCESSION NUMBER(S): [id number]  ORDERING CLINICIAN: ROXANNE LAZO  TECHNIQUE: CT of the abdomen and pelvis was performed. Contiguous axial images were obtained at 3 mm slice thickness through the abdomen and pelvis. Coronal and sagittal reconstructions at 3 mm slice thickness were performed.  No intravenous or oral contrast agents were administered.  FINDINGS: Please note that the evaluation of vessels, lymph nodes and organs is limited without intravenous contrast.  LOWER CHEST: In the interim since prior study on 09/13/2023, new large right-sided and moderate to large left-sided pleural effusions are present with associated ground-glass opacities and atelectatic changes in the lower lobes.  Heart is mildly enlarged without pericardial effusion.  Small hiatal hernia is present.  ABDOMEN:  LIVER: Within limits of noncontrast exam liver does not demonstrate any acute abnormalities.  BILE DUCTS: No intrahepatic or extrahepatic biliary dilatation is present.  GALLBLADDER: Gallbladder does not demonstrate any wall thickening or pericholecystic stranding.  PANCREAS: No new pancreatic ductal dilatation or pancreatic stranding is present.  SPLEEN: Spleen is unchanged in appearance to prior study, without evidence of new abnormalities.  ADRENAL GLANDS: Bilateral adrenal glands are unremarkable in appearance.  KIDNEYS AND URETERS: Native kidneys are atrophic without evidence of hydronephrosis or nephrolithiasis. A transplanted kidney is present in the left lower quadrant of the pelvis in the abdomen, and is not demonstrate any radiopaque stones or hydronephrosis.  PELVIS:  BLADDER: Bladder is nondistended and demonstrates mildly thickened wall.  REPRODUCTIVE  ORGANS: No pelvic masses are present. Prostate appears to be surgically absent.  BOWEL: Stomach is unremarkable in appearance. Postsurgical changes of partial small-bowel and large bowel resection are evident, without abnormal bowel dilatation or focal inflammatory changes present in the abdomen and pelvis. Several diverticula are present in the remaining sigmoid colon without evidence of acute diverticulitis. Appendix is not definitely identified, although no acute inflammatory changes are present in the right lower quadrant in the its expected location.  VESSELS: There is no aneurysmal dilatation of the abdominal aorta. The IVC appears normal. Scattered atherosclerotic plaques are present in the abdominal aorta.  PERITONEUM/RETROPERITONEUM/LYMPH NODES: Mild nonspecific mesenteric haziness is present in the central abdomen in the epigastric region, slightly more conspicuous compared to prior exam on 09/13/2023, without evidence of fat stranding, free fluid, or thick-walled collections. No free air is identified. No abdominopelvic lymphadenopathy is present.  ABDOMINAL WALL: Soft tissues of the abdominal wall are unremarkable in appearance.  BONES: No suspicious osseous lesions are identified.      1.  Large right-sided and moderate to large left-sided pleural effusions are present in the lungs bilaterally with mild associated ground-glass in the atelectatic changes in the lower lobes, new since prior exam on 09/13/2023. 2. Transplanted kidney is present in the left lower quadrant of the abdomen/pelvis, without evidence of hydronephrosis or nephrolithiasis. 3. Slight nonspecific mesenteric haziness in the epigastric region of the abdomen, new/increased since prior CT on 09/13/2023. Correlate with cardiac function. 4. Postsurgical changes of small and large bowel resection, without evidence of abnormal bowel dilatation or focal inflammatory wall thickening changes in the abdomen or pelvis. 5. Several diverticula are  present in the sigmoid colon without evidence of acute diverticulitis. 6. Small hiatal hernia.   MACRO: None    XR chest 2 view    Result Date: 9/17/2023  Interpreted By:  BOOGIE ALBARADO MD MRN: [id number] Patient Name: KELVIN ROB  STUDY: Chest dated  9/17/2023.  INDICATION: cp sob  COMPARISON: Chest dated 09/13/2020.  ACCESSION NUMBER(S): [id number]  ORDERING CLINICIAN: ROXANNE LAZO  TECHNIQUE: PA and lateral radiograph of the chest.  FINDINGS: There are ill-defined bibasilar opacities most evident in the central right lower lung zone.  No pneumothorax or effusion is evident. The cardiomediastinal silhouette is  enlarged but similar to the prior exam.A stent is seen over the left axilla.      Ill-defined bibasilar opacities which could represent atelectasis and/or pneumonitis.    Electrocardiogram 12 Lead    Result Date: 9/14/2023  Please see ED Provider Note for formal interpretation Confirmed by Aamir Trent ([id number]) on 9/14/2023 4:03:48 AM    CT abdomen pelvis wo IV contrast    Result Date: 9/13/2023  Interpreted By:  FLEX TOLEDO MD and CHETAN BRUNNER MD MRN: [id number] Patient Name: KELVIN ROB  STUDY: CT ABDOMEN AND PELVIS WO CONTRAST;  9/13/2023 5:42 pm  INDICATION: L flank pain, h/o of kidney transplant, Lie Flat: Yes .  COMPARISON: CT chest abdomen pelvis 08/20/2023  ACCESSION NUMBER(S): [id number]  ORDERING CLINICIAN: RAMY DÍAZ  TECHNIQUE: CT of the abdomen and pelvis was performed. Contiguous axial images were obtained at 3 mm slice thickness through the abdomen and pelvis. Coronal and sagittal reconstructions at 3 mm slice thickness were performed.  No intravenous contrast was administered; positive oral contrast was given.  FINDINGS: Please note that the evaluation of vessels, lymph nodes and organs is limited without intravenous contrast.  LOWER CHEST: Resolution of small bilateral pleural effusions. Similar asymmetric enlargement of the left atrium and left ventricle. No  significant pericardial effusion. Small sliding hiatal hernia is again noted.  ABDOMEN:  LIVER: The liver is normal in size without evidence of focal liver lesions.  BILE DUCTS: The intrahepatic and extrahepatic ducts are not dilated.  GALLBLADDER: The gallbladder is nondistended and without evidence of radiopaque stones. There is layering hyperdense material within the gallbladder which may relate to biliary sludge.  PANCREAS: The pancreas appears unremarkable without evidence of ductal dilatation or masses.  SPLEEN: The spleen is normal in size without focal lesions.  ADRENAL GLANDS: Bilateral adrenal glands appear normal.  KIDNEYS AND URETERS: Redemonstration of severely atrophic kidneys with intraparenchymal calcifications. No hydroureteronephrosis or nephroureterolithiasis.  Similar appearance of the left iliac fossa transplant kidney with mild perinephric fat stranding. No hydronephrosis or nephroureterolithiasis of the transplant kidney.  PELVIS:  BLADDER: There is mild circumferential mural thickening of the urinary bladder with mild perivesical fat stranding.  REPRODUCTIVE ORGANS: No pelvic masses.  BOWEL: Small hiatal hernia. Postsurgical changes from right hemicolectomy with intact ileocolonic anastomosis in the left upper quadrant. An additional intact small bowel anastomosis is noted in the right upper quadrant. The small and large bowel are normal in caliber without wall thickening. Scattered colonic diverticula without evidence of acute diverticulitis.  VESSELS: Mild atherosclerotic changes of the abdominal aorta and its branches without aneurysmal dilatation. Severe calcifications of the bilateral internal iliac arteries are again noted.  PERITONEUM/RETROPERITONEUM/LYMPH NODES: No ascites or free air, no fluid collection.  No abdominopelvic lymphadenopathy is present.  ABDOMINAL WALL: Similar subcutaneous fat stranding along the left lower rectus abdominus muscle. Mild diffuse body wall edema.   BONES: No suspicious osseous lesions are identified. Degenerative discogenic disease is noted in the lower thoracic and lumbar spine, most prominently at T10-11 with moderate disc height loss, degenerative endplate changes, and diffuse disc bulge which results moderate left and mild right neural foraminal narrowing.      1. Circumferential urinary bladder wall thickening with perivesical fat stranding which may be seen in the setting of cystitis. 2. Status post left iliac fossa renal transplant with similar mild perinephric fat stranding, nonspecific. 3. Similar mild anasarca. 4. Resolution of small bilateral pleural effusions. 5. Additional findings as above.  I personally reviewed the images/study and I agree with the findings as stated. This study was interpreted at [place], [place].  MACRO: None    CT head wo IV contrast    Result Date: 9/13/2023  Interpreted By:  LORRI MOBLYE MD MRN: [id number] Patient Name: KELVIN ROB  STUDY: CT HEAD WO CONTRAST;  9/13/2023 5:42 pm  INDICATION: headache, Lie Flat: Yes .  COMPARISON: None.  ACCESSION NUMBER(S): [id number]  ORDERING CLINICIAN: RAMY DÍAZ  TECHNIQUE: Noncontrast axial CT scan of head was performed. Angled reformats in brain and bone windows were generated. The images were reviewed in bone, brain, blood and soft tissue windows.  FINDINGS: CSF Spaces: The ventricles, sulci and basal cisterns are within normal limits. There is no extraaxial fluid collection.  Parenchyma: Mild degree of nonspecific white matter hypodensity is compatible with microangiopathy. The grey-white differentiation is intact. There is no mass effect or midline shift.  There is no intracranial hemorrhage.  Calvarium: The calvarium is unremarkable. Intracranial vascular calcifications.  Paranasal sinuses and mastoids: Visualized paranasal sinuses and mastoids are clear.      No acute intracranial hemorrhage or mass effect.  Mild degree of  nonspecific white matter hypodensities compatible with microangiopathy.  MACRO: None    XR chest 2 view    Result Date: 9/13/2023  Interpreted By:  BALJEET HORTON MD STUDY: Chest Radiographs;  9/13/2023 10:32 AM.  INDICATION: Unspecified chest pain.  COMPARISON: Chest radiographs 5/6/2023.  ACCESSION NUMBER(S): [id number]  ORDERING CLINICIAN: JEFFREY VILLAGRAN MD  TECHNIQUE:  Frontal and lateral chest.  FINDINGS:  CARDIOMEDIASTINAL SILHOUETTE: Cardiomediastinal silhouette is normal in size and configuration.  LUNGS: Possible 8 mm pulmonary nodule left upper lobe, not definitively seen on prior exams. No consolidative airspace disease. No pleural effusion. No pneumothorax.  ABDOMEN: No remarkable upper abdominal findings.  BONES: No acute osseous changes.      Possible 8 mm pulmonary nodule left upper lobe, not definitively seen on prior exams. Follow-up chest CT advised.   Signed by Baljeet Horton MD        Physical Exam     Constitutional   General appearance: Alert     Pulmonary   Respiratory assessment: No respiratory distress, normal respiratory rhythm and effort.    Auscultation of Lungs: Clear bilateral breath sounds.   Cardiovascular   Auscultation of heart: Apical pulse normal, heart rate and rhythm normal, normal S1 and S2, no murmurs and no pericardial rub.    Exam for edema: No peripheral edema.   Abdomen   Abdominal Exam: tenderness, suprapubic discomfort  Musculoskeletal   Examination of gait: Normal.    Inspection of digits and nails: No clubbing or cyanosis of the fingernails.    Inspection/palpation of joints, bones and muscles: No joint swelling. Normal movement of all extremities.   Skin   Skin inspection: Normal skin color and pigmentation, normal skin turgor and no visible rash.   Neurologic   Cranial nerves: Nerves 2-12 were intact, no focal neuro defects.     Assessment/Plan      #Abdominal pain  Needs regular IV pain medications  I did discuss with Dr. Pickering who said he will take a look  at him later    #Suprapubic discomfort  Bladder ultrasound showed 60 cc postvoid residual    #Acute on chronic kidney injury  History of renal transplant  IV fluids    #Hypertension  Stable    #Pancytopenia  Monitor numbers  No active signs of bleeding  Might need hematological work-up

## 2023-10-08 NOTE — CARE PLAN
The patient's goals for the shift include  pt will remain safe throughout the shift.    The clinical goals for the shift include pt will report improved pain with higher dose of pain medication and heat by end of shift    Over the shift, the patient did not make progress toward the following goals. Barriers to progression include . Recommendations to address these barriers include .

## 2023-10-09 LAB
ANION GAP SERPL CALC-SCNC: 8 MMOL/L (ref 10–20)
BUN SERPL-MCNC: 19 MG/DL (ref 6–23)
CALCIUM SERPL-MCNC: 9.6 MG/DL (ref 8.6–10.3)
CHLORIDE SERPL-SCNC: 106 MMOL/L (ref 98–107)
CO2 SERPL-SCNC: 24 MMOL/L (ref 21–32)
CREAT SERPL-MCNC: 2.51 MG/DL (ref 0.5–1.3)
ERYTHROCYTE [DISTWIDTH] IN BLOOD BY AUTOMATED COUNT: 15.6 % (ref 11.5–14.5)
GFR SERPL CREATININE-BSD FRML MDRD: 27 ML/MIN/1.73M*2
GLUCOSE BLD MANUAL STRIP-MCNC: 136 MG/DL (ref 74–99)
GLUCOSE BLD MANUAL STRIP-MCNC: 152 MG/DL (ref 74–99)
GLUCOSE BLD MANUAL STRIP-MCNC: 166 MG/DL (ref 74–99)
GLUCOSE BLD MANUAL STRIP-MCNC: 223 MG/DL (ref 74–99)
GLUCOSE SERPL-MCNC: 142 MG/DL (ref 74–99)
HCT VFR BLD AUTO: 23.1 % (ref 41–52)
HGB BLD-MCNC: 7.9 G/DL (ref 13.5–17.5)
MCH RBC QN AUTO: 27.6 PG (ref 26–34)
MCHC RBC AUTO-ENTMCNC: 34.2 G/DL (ref 32–36)
MCV RBC AUTO: 81 FL (ref 80–100)
NRBC BLD-RTO: 0 /100 WBCS (ref 0–0)
PLATELET # BLD AUTO: 98 X10*3/UL (ref 150–450)
PMV BLD AUTO: 9.3 FL (ref 7.5–11.5)
POTASSIUM SERPL-SCNC: 4.2 MMOL/L (ref 3.5–5.3)
RBC # BLD AUTO: 2.86 X10*6/UL (ref 4.5–5.9)
SODIUM SERPL-SCNC: 134 MMOL/L (ref 136–145)
WBC # BLD AUTO: 2.7 X10*3/UL (ref 4.4–11.3)

## 2023-10-09 PROCEDURE — 80048 BASIC METABOLIC PNL TOTAL CA: CPT | Performed by: INTERNAL MEDICINE

## 2023-10-09 PROCEDURE — 2500000002 HC RX 250 W HCPCS SELF ADMINISTERED DRUGS (ALT 637 FOR MEDICARE OP, ALT 636 FOR OP/ED): Performed by: NURSE PRACTITIONER

## 2023-10-09 PROCEDURE — 36415 COLL VENOUS BLD VENIPUNCTURE: CPT | Performed by: INTERNAL MEDICINE

## 2023-10-09 PROCEDURE — 2500000004 HC RX 250 GENERAL PHARMACY W/ HCPCS (ALT 636 FOR OP/ED): Performed by: INTERNAL MEDICINE

## 2023-10-09 PROCEDURE — 2500000001 HC RX 250 WO HCPCS SELF ADMINISTERED DRUGS (ALT 637 FOR MEDICARE OP): Performed by: FAMILY MEDICINE

## 2023-10-09 PROCEDURE — 1100000001 HC PRIVATE ROOM DAILY

## 2023-10-09 PROCEDURE — 96372 THER/PROPH/DIAG INJ SC/IM: CPT | Performed by: NURSE PRACTITIONER

## 2023-10-09 PROCEDURE — 2500000005 HC RX 250 GENERAL PHARMACY W/O HCPCS: Performed by: NURSE PRACTITIONER

## 2023-10-09 PROCEDURE — 2500000004 HC RX 250 GENERAL PHARMACY W/ HCPCS (ALT 636 FOR OP/ED): Performed by: FAMILY MEDICINE

## 2023-10-09 PROCEDURE — 2500000004 HC RX 250 GENERAL PHARMACY W/ HCPCS (ALT 636 FOR OP/ED): Performed by: NURSE PRACTITIONER

## 2023-10-09 PROCEDURE — 2500000001 HC RX 250 WO HCPCS SELF ADMINISTERED DRUGS (ALT 637 FOR MEDICARE OP): Performed by: NURSE PRACTITIONER

## 2023-10-09 PROCEDURE — 82947 ASSAY GLUCOSE BLOOD QUANT: CPT

## 2023-10-09 PROCEDURE — 85027 COMPLETE CBC AUTOMATED: CPT | Performed by: INTERNAL MEDICINE

## 2023-10-09 RX ADMIN — HYDRALAZINE HYDROCHLORIDE 25 MG: 25 TABLET ORAL at 21:16

## 2023-10-09 RX ADMIN — CARVEDILOL 25 MG: 25 TABLET, FILM COATED ORAL at 09:11

## 2023-10-09 RX ADMIN — INSULIN LISPRO 1 UNITS: 100 INJECTION, SOLUTION INTRAVENOUS; SUBCUTANEOUS at 09:13

## 2023-10-09 RX ADMIN — FERROUS SULFATE TAB 325 MG (65 MG ELEMENTAL FE) 65 MG OF IRON: 325 (65 FE) TAB at 21:16

## 2023-10-09 RX ADMIN — SODIUM CHLORIDE 75 ML/HR: 9 INJECTION, SOLUTION INTRAVENOUS at 05:40

## 2023-10-09 RX ADMIN — TACROLIMUS 3 MG: 1 CAPSULE ORAL at 05:40

## 2023-10-09 RX ADMIN — AMLODIPINE BESYLATE 10 MG: 10 TABLET ORAL at 09:12

## 2023-10-09 RX ADMIN — BENZONATATE 100 MG: 100 CAPSULE ORAL at 21:16

## 2023-10-09 RX ADMIN — PRAVASTATIN SODIUM 40 MG: 40 TABLET ORAL at 21:16

## 2023-10-09 RX ADMIN — AZATHIOPRINE 50 MG: 50 TABLET ORAL at 09:13

## 2023-10-09 RX ADMIN — TACROLIMUS 3 MG: 1 CAPSULE ORAL at 17:14

## 2023-10-09 RX ADMIN — LIDOCAINE 1 PATCH: 4 PATCH TOPICAL at 09:13

## 2023-10-09 RX ADMIN — HYDROMORPHONE HYDROCHLORIDE 0.4 MG: 1 INJECTION, SOLUTION INTRAMUSCULAR; INTRAVENOUS; SUBCUTANEOUS at 09:16

## 2023-10-09 RX ADMIN — Medication 50 MCG: at 09:12

## 2023-10-09 RX ADMIN — HYDROMORPHONE HYDROCHLORIDE 0.4 MG: 1 INJECTION, SOLUTION INTRAMUSCULAR; INTRAVENOUS; SUBCUTANEOUS at 17:15

## 2023-10-09 RX ADMIN — FERROUS SULFATE TAB 325 MG (65 MG ELEMENTAL FE) 65 MG OF IRON: 325 (65 FE) TAB at 09:12

## 2023-10-09 RX ADMIN — DOCUSATE SODIUM 100 MG: 100 CAPSULE, LIQUID FILLED ORAL at 09:16

## 2023-10-09 RX ADMIN — CARVEDILOL 25 MG: 25 TABLET, FILM COATED ORAL at 21:16

## 2023-10-09 RX ADMIN — HYDROMORPHONE HYDROCHLORIDE 0.4 MG: 1 INJECTION, SOLUTION INTRAMUSCULAR; INTRAVENOUS; SUBCUTANEOUS at 00:43

## 2023-10-09 RX ADMIN — HYDRALAZINE HYDROCHLORIDE 25 MG: 25 TABLET ORAL at 16:06

## 2023-10-09 RX ADMIN — HYDRALAZINE HYDROCHLORIDE 25 MG: 25 TABLET ORAL at 09:12

## 2023-10-09 RX ADMIN — GABAPENTIN 100 MG: 100 CAPSULE ORAL at 21:16

## 2023-10-09 RX ADMIN — PANTOPRAZOLE SODIUM 40 MG: 40 TABLET, DELAYED RELEASE ORAL at 09:12

## 2023-10-09 RX ADMIN — INSULIN GLARGINE 10 UNITS: 100 INJECTION, SOLUTION SUBCUTANEOUS at 09:27

## 2023-10-09 RX ADMIN — PREDNISONE 5 MG: 5 TABLET ORAL at 09:12

## 2023-10-09 RX ADMIN — HYDROMORPHONE HYDROCHLORIDE 0.4 MG: 1 INJECTION, SOLUTION INTRAMUSCULAR; INTRAVENOUS; SUBCUTANEOUS at 22:33

## 2023-10-09 RX ADMIN — INSULIN LISPRO 1 UNITS: 100 INJECTION, SOLUTION INTRAVENOUS; SUBCUTANEOUS at 17:16

## 2023-10-09 ASSESSMENT — COGNITIVE AND FUNCTIONAL STATUS - GENERAL
DAILY ACTIVITIY SCORE: 24
MOBILITY SCORE: 24

## 2023-10-09 ASSESSMENT — PAIN - FUNCTIONAL ASSESSMENT
PAIN_FUNCTIONAL_ASSESSMENT: 0-10

## 2023-10-09 ASSESSMENT — PAIN SCALES - GENERAL
PAINLEVEL_OUTOF10: 10 - WORST POSSIBLE PAIN
PAINLEVEL_OUTOF10: 5 - MODERATE PAIN
PAINLEVEL_OUTOF10: 7
PAINLEVEL_OUTOF10: 8
PAINLEVEL_OUTOF10: 10 - WORST POSSIBLE PAIN
PAINLEVEL_OUTOF10: 7
PAINLEVEL_OUTOF10: 7

## 2023-10-09 ASSESSMENT — PAIN SCALES - WONG BAKER: WONGBAKER_NUMERICALRESPONSE: HURTS EVEN MORE

## 2023-10-09 ASSESSMENT — PAIN DESCRIPTION - DESCRIPTORS
DESCRIPTORS: ACHING

## 2023-10-09 ASSESSMENT — PAIN SCALES - PAIN ASSESSMENT IN ADVANCED DEMENTIA (PAINAD): BREATHING: NORMAL

## 2023-10-09 NOTE — PROGRESS NOTES
Patient not medically ready for discharge. Post Lap Lucy 09/24/2023. Per MD notes, anticipate Hematology Consult for pancytopenia. ( Morning Labs: WBC 2.7. H/H 7.9/23.1. PLT 98.  Patient with ongoing abdominal pain/ discomfort. GI and Surgery Services consulted with no additional recommendations. Medical management and monitoring ongoing.  Nursing [facility] 24. No skilled needs identified at this time. [facility] following for skilled needs.   Umm ESPINOSAN RN [facility] CCM

## 2023-10-09 NOTE — PROGRESS NOTES
Manolo Bashir is a 68 y.o. male     Continues to have abdominal discomfort  Unclear etiology  Does have constipation  No difficult in passing urine    Reviewed imaging studies  CT abdomen and pelvis was unremarkable  Vascular studies did not show any mesenteric ischemia  Surgery and GI have seen the patient and no further recommendations have been offered  However the patient continues to be in discomfort    Review of Systems     Constitutional: In distress  Cardiovascular: the heart rate was not slow, the heart rate was not fast, no chest pain, no palpitations, no intermittent leg claudication and no lower extremity edema.   Respiratory: no cough, wheezing or shortness of breath at rest or exertion  Gastrointestinal: Abdominal pain  Musculoskeletal: no arthralgias, no myalgias, back pain is better with lidoderm patch,  no joint swelling, no joint stiffness, no limb pain and no limb swelling.   Integumentary: no skin rashes, no skin lesions, no itching, no skin wound and no dry skin.   Neurological: no headache, no confusion, no numbness, no dizziness, no tingling and no fainting.   All other systems have been reviewed and are negative for complaint.       Vitals:    10/09/23 1222   BP: 159/72   Pulse: 65   Resp: 18   Temp: 37.4 °C (99.3 °F)   SpO2: 96%        Scheduled medications  amLODIPine, 10 mg, oral, Daily  azaTHIOprine, 50 mg, oral, Daily  benzonatate, 100 mg, oral, TID  carvedilol, 25 mg, oral, BID  cholecalciferol, 50 mcg, oral, Daily  ferrous sulfate, 65 mg of iron, oral, BID  gabapentin, 100 mg, oral, Nightly  heparin, 5,000 Units, subcutaneous, q8h  hydrALAZINE, 25 mg, oral, TID  insulin glargine, 10 Units, subcutaneous, Daily  insulin lispro, 0-5 Units, subcutaneous, TID with meals  lidocaine, 1 patch, transdermal, Daily  [Held by provider] losartan, 25 mg, oral, Daily  pantoprazole, 40 mg, oral, Daily before breakfast  pravastatin, 40 mg, oral, Nightly  predniSONE, 5 mg, oral, q AM  tacrolimus, 3 mg,  oral, q12h OZË      Continuous medications  sodium chloride 0.9%, 75 mL/hr, Last Rate: 75 mL/hr (10/09/23 0540)    PRN medications  PRN medications: acetaminophen, dextrose, docusate sodium, glucagon, HYDROmorphone, ondansetron ODT    Lab Review   Results from last 7 days   Lab Units 10/09/23  0613 10/08/23  0626 10/07/23  0535   WBC AUTO x10*3/uL 2.7* 2.3* 2.5*   HEMOGLOBIN g/dL 7.9* 8.0* 8.7*   HEMATOCRIT % 23.1* 23.6* 25.6*   PLATELETS AUTO x10*3/uL 98* 90* 98*       Results from last 7 days   Lab Units 10/09/23  0613 10/08/23  0626 10/07/23  0535 10/05/23  0813   SODIUM mmol/L 134* 134* 134* 134*   POTASSIUM mmol/L 4.2 4.4 4.2 4.1   CHLORIDE mmol/L 106 105 103 103   CO2 mmol/L 24 25 24 27   BUN mg/dL 19 17 19 18   CREATININE mg/dL 2.51* 2.56* 2.61* 2.55*   CALCIUM mg/dL 9.6 9.8 10.0 10.1   PROTEIN TOTAL g/dL  --   --   --  5.9*   BILIRUBIN TOTAL mg/dL  --   --   --  0.6   ALK PHOS U/L  --   --   --  64   ALT U/L  --   --   --  12   AST U/L  --   --   --  21   GLUCOSE mg/dL 142* 168* 149* 149*            XR lumbar spine complete 4+ views    Result Date: 10/6/2023  Interpreted By:  Renaldo Cunningham, STUDY: XR LUMBAR SPINE COMPLETE 4+ VIEWS;  10/6/2023 11:27 am   INDICATION: Signs/Symptoms:lower back pain.   COMPARISON: None.   ACCESSION NUMBER(S): [id number]   ORDERING CLINICIAN: BENNY MUNSON   FINDINGS: Multiple views of the  lumbar spine are obtained. Alignment is intact. The vertebral body heights and disc heights are preserved. Endplate sclerosis and osteophytes are seen throughout the spine. No acute fracture-dislocation.       Minimal discogenic degenerative changes as described.     Signed by: Renaldo Cunningham 10/6/2023 11:49 AM Dictation workstation:   [id number]    XR thoracic spine 3 views    Result Date: 10/6/2023  Interpreted By:  Renaldo Cunningham, STUDY: XR THORACIC SPINE 3 VIEWS;  10/6/2023 11:27 am   INDICATION: Signs/Symptoms:lower back.   COMPARISON: None.   ACCESSION NUMBER(S): [id number]   ORDERING  CLINICIAN: BENNY MUNSON   FINDINGS: Multiple views of the  thoracic spine are obtained. Apparent osteopenia. Mild to moderate discogenic degenerative changes. Minimal anterior loss of height of 2 adjacent midthoracic spine vertebral bodies unchanged.   Endplate irregularity and slight widening of the disc space at T6-C7 and T10/T11. Underlying discitis/osteomyelitis not excluded. Correlate clinically and follow-up MRI as clinically warranted.       Apparent osteopenia and discogenic degenerative changes.     Endplate irregularity and slight widening of the disc space at T6-C7 and T10/T11. Underlying discitis/osteomyelitis not excluded. Correlate clinically and follow-up MRI as clinically warranted.       Signed by: Renaldo Cunningham 10/6/2023 11:45 AM Dictation workstation:   [id number]    CT abdomen pelvis wo IV contrast    Result Date: 10/6/2023  Interpreted By:  Renaldo Cunningham, STUDY: CT ABDOMEN PELVIS WO IV CONTRAST;  10/6/2023 9:27 am   INDICATION: Signs/Symptoms:abdominal pain.   COMPARISON: 10/02/2023   ACCESSION NUMBER(S): [id number]   ORDERING CLINICIAN: BENNY MUNSON   TECHNIQUE: CT of the abdomen and pelvis was performed. Contiguous axial images were obtained at 3 mm slice thickness through the abdomen and pelvis. Coronal and sagittal reconstructions at 3 mm slice thickness were performed.  No intravenous contrast was administered; positive oral contrast was given.   FINDINGS: Please note that the evaluation of vessels, lymph nodes and organs is limited without intravenous contrast.   LOWER CHEST: The visualized lung base is remarkable for bibasilar pleural effusions and cardiomegaly. These are similar to the prior exam.   ABDOMEN:   LIVER: The liver is grossly unremarkable. No definite focal liver lesions.   BILE DUCTS: No bile duct dilatation.   GALLBLADDER: The gallbladder is not clearly identified and per history surgically absent. Minimal fluid and linear area of high density in the gallbladder fossa and  similar to the prior exam..   PANCREAS: Unremarkable pancreas within the limits of this unenhanced study.   SPLEEN: Grossly unremarkable.   ADRENAL GLANDS: No adrenal masses.   KIDNEYS AND URETERS: A trophy of the native kidneys in.. No evidence of hydronephrosis. Transplant kidney within the left aspect of the pelvis. No hydronephrosis of the transplant kidney. PELVIS:   BLADDER: The bladder is incompletely distended and evaluated.   REPRODUCTIVE ORGANS: No definite masses.   BOWEL: No evidence of bowel obstruction. No focal inflammatory changes. Postsurgical changes and sutures involving a loop of colon in the upper anterior abdomen. Also sutures in the loop of small bowel in the right mid abdomen anteriorly.   VESSELS: Normal caliber aorta. Small vessel calcification throughout.   PERITONEUM/RETROPERITONEUM/LYMPH NODES: Free pelvic fluid. No significant retroperitoneal adenopathy.   ABDOMINAL WALL: Grossly unremarkable.   BONES: Discogenic degenerative changes.       1. Nonspecific free pelvic fluid. 2. Interval cholecystectomy. 3. Persistent small bibasilar pleural effusions. 4. Atrophic native kidneys with a transplant kidney within the left aspect of the pelvis.   MACRO: None   Signed by: Renaldo Cunningham 10/6/2023 10:00 AM Dictation workstation:   [id number]    Vascular US mesenteric artery duplex complete    Result Date: 10/6/2023             [place] [address]   Tel [phone] and Fax [phone]  Vascular Lab Report Mesenteric Ultrasound  Patient Name:      KELVIN ROB      Alma Delia Physician:  [id number] Calvin Mohan DO Study Date:        10/5/2023           Ordering Provider:  [id number] SANG SIDDIQUI MRN/PID:           [id number]            Technologist:       Kaur Hightower RVT Accession#:        [id number]        Technologist 2: Date of Birth/Age: 1955 / 68      Encounter#:         [id number]                     years Gender:            M Admission Status:  Inpatient           Location Performed: [facility]  Diagnosis/ICD: Lower abdominal/groin pain-R10.30 Indication:  CONCLUSIONS: Mesenteric: The celiac, hepatic, splenic and SMA appear widely patent with no evidence of stenosis. The JAMIE appears widely patent. The patient was NPO for this study. There is shadowing artifact noted at the origin of the celiac artery. The celiac artery with inspiration demonstrates a velocity of 98 cm/s.  Imaging & Doppler Findings:  Aorta PSV         118 cm/s Celiac Origin  cm/s Celiac Prox PSV   104 cm/s Celiac Mid PSV    117 cm/s Celiac Dist PSV   144 cm/s SMA Origin PSV    119 cm/s SMA Prox PSV      136 cm/s SMA Mid PSV       123 cm/s SMA Dist PSV      136 cm/s JAMIE PSV           107 cm/s JAMIE Prox PSV      116 cm/s Hepatic PSV       104 cm/s Splenic PSV       141 cm/s   [id number] Calvin Mohan DO Electronically signed by [id number] Calvin Mohan DO on 10/6/2023 at 8:37:34 AM  ** Final **     CT abdomen pelvis wo IV contrast    Result Date: 10/2/2023  Interpreted By:  Constantino Jin, STUDY: CT ABDOMEN PELVIS WO IV CONTRAST; 10/2/2023 11:28 am   INDICATION: Signs/Symptoms:s/p lap eduarda 1 week ago, now having RUQ pain, N/V, h/o renal transplant.   COMPARISON: CT abdomen and pelvis dated 09/17/2023   ACCESSION NUMBER(S): [id number]   ORDERING CLINICIAN: CATALINO PENNINGTON   TECHNIQUE: Contiguous axial images were obtained at 3mm slice thickness through the abdomen and pelvis without intravenous contrast administration. Coronal and sagittal reconstructions at 3 mm slice thickness were performed. Intravenous contrast was not given per the referring physician's request.   FINDINGS: The study is severely limited by the lack of intravenous contrast.   LOWER CHEST: Evaluation of the visualized lung bases demonstrate small bilateral pleural effusions, larger on the right than on the left, similar to the prior study. Dependent airspace  consolidations are seen bilaterally, and may represent atelectasis and/or pneumonia. The heart is at the upper limits of normal for size, similar to the prior study.   ABDOMEN:   LIVER: The liver is within normal limits for appearance, without evidence of focal masses.   BILE DUCTS: No definite intra or extrahepatic biliary dilatation is identified.   GALLBLADDER: The gallbladder is surgically absent.   PANCREAS: The pancreas is within normal limits for appearance, without evidence of focal masses.   SPLEEN: The spleen is within normal limits for size. No focal splenic mass is seen.   ADRENAL GLANDS: No definite adrenal nodules or masses are seen bilaterally.   KIDNEYS AND URETERS: The native kidneys are severely atrophic bilaterally. A transplanted kidney is seen in the left pelvic fossa. There is no hydronephrosis, hydroureter or renal/ ureteral calculus identified. No definite focal renal mass is seen, though evaluation is severely limited by the lack of intravenous contrast.   PELVIS:   BLADDER: The bladder is decompressed.   REPRODUCTIVE ORGANS: The prostate is within normal limits for appearance.   BOWEL: The patient is status post right hemicolectomy with ileal colonic anastomosis. The residual colon and small bowel are within normal limits for course, caliber and appearance, without evidence of wall thickening or obstruction. No CT evidence of acute diverticulitis is seen.   VESSELS: Scattered atherosclerotic calcifications are seen throughout the infrarenal abdominal aorta and iliac arteries. The abdominal aorta is within normal limits for course, caliber and appearance, without evidence of aneurysm.   PERITONEUM/RETROPERITONEUM/LYMPH NODES: There is no free intraperitoneal air or free fluid identified. No gross mesenteric or retroperitoneal lymphadenopathy is identified.   BONE AND SOFT TISSUE: There is no evidence of acute fracture identified. No evidence of abdominal wall mass or hernia is identified.        1. Postoperative changes, as above. 2. No evidence of bowel obstruction, free intraperitoneal air or abnormal intra-abdominal fluid collection. 3. Bilateral pleural effusions and basilar airspace consolidations, as above.   MACRO: None   Signed by: Constantino Jin 10/2/2023 11:46 AM Dictation workstation:   [id number]    XR chest 1 view    Result Date: 9/23/2023  Interpreted By:  CONSTANTINO JIN MD MRN: [id number] Patient Name: KELVIN ROB  STUDY: CHEST 1 VIEW  9/23/2023 2:05 pm  INDICATION: sob  COMPARISON: 09/17/2023  ACCESSION NUMBER(S): [id number]  ORDERING CLINICIAN: BOBBI PALMA  TECHNIQUE: A single AP portable radiograph of the chest was obtained.  FINDINGS: Mild diffuse interstitial infiltrates are seen bilaterally, and may represent edema and/or pneumonia. No pneumothorax is identified. The cardiac silhouette is within normal limits for size.      Diffuse interstitial infiltrates bilaterally, as above. Clinical correlation and continued follow-up until clearing is recommended.  MACRO: None.    NM liver spleen    Result Date: 9/22/2023  Interpreted By:  TRAVIS BOSE MD MRN: [id number] Patient Name: KELVIN ROB  STUDY: BILIARY WITH EF W OR W/O CCK;  9/22/2023 4:25 pm  INDICATION: nausea/vomiting, sludge in gallbladder on CT .  COMPARISON: CT of abdomen and pelvis on 09/17/2023  ACCESSION NUMBER(S): [id number]  ORDERING CLINICIAN: NIRANJAN CRUZ  TECHNIQUE: DIVISION OF NUCLEAR MEDICINE HEPATOBILIARY SCAN (HIDA), QUANTITATIVE  The patient received an intravenous dose of 6.0 mCi of Tc-99m mebrofenin (Choletec).  Sequential images of the upper abdomen were then acquired over the next 60 minutes.  An intravenous infusion of the cholecystokinin (CCK) analogue, Sincalide, was then administered followed by an additional period of imaging.  Computer quantification of gallbladder emptying was also performed  FINDINGS: There is prompt accumulation of activity within the liver and normal subsequent excretion via the biliary  ductal system into the small bowel.  The gallbladder first visualizes at about  22 minutes after radiopharmaceutical injection and progressively fills.  After Sincalide administration, there is no contraction of the gallbladder. The gallbladder ejection fraction is calculated to be  0 % (normal above 38%).      1. This study demonstrates patency of cystic duct and common bile duct, no evidence of acute cholecystitis. 2. No contraction of the gallbladder after CCK administration, suggestive of biliary dyskinesia.  I personally reviewed the images/study. This study was interpreted at [place], [place].    XR abdomen 1 view    Result Date: 9/22/2023  Interpreted By:  AARTI MONTZE MD MRN: [id number] Patient Name: KELVIN ROB  STUDY: ABDOMEN AP VIEW;  9/22/2023 4:22 pm  INDICATION: n/v .  COMPARISON: CT scan from 09/17/2023. KUB from 08/22/2023.  ACCESSION NUMBER(S): [id number]  ORDERING CLINICIAN: BENNY MUNSON  TECHNIQUE:   2 supine views of the abdomen and pelvis were obtained.  FINDINGS: There was   a colonic anastomosis in the region of the left side of the transverse colon, and a presume small-bowel anastomosis in the mid right abdomen. There is mild-to-moderate stool and gas in the left transverse colon down through the rectum. There is mild gas in nondilated loops of small bowel in the central abdomen.. Surgical clips in the left pelvis consistent with known left pelvic renal allograft. There was no gross organomegaly. Arterial calcifications in the iliac arteries. No destructive bone lesion.  Sclerotic arthritic changes in both SI joints. The extreme lung bases were clear. Cardiomegaly.      Previous intestinal surgery.  Retained colonic stool and gas as described. Mild small-bowel gas without suspicious small bowel dilatation.  MACRO: None    US chest    Result Date: 9/19/2023  Interpreted By:  ASIF BAUTISTA CNP MRN: [id number] Patient Name: KELVIN ROB  STUDY: US  CHEST; Right;  9/19/2023 2:20 pm  INDICATION: thoracentesis .  COMPARISON: None.  ACCESSION NUMBER(S): [id number]  ORDERING CLINICIAN: RAIMUNDO LEOS  TECHNIQUE: Limited ultrasound of the chest for thoracentesis  FINDINGS: Focused ultrasound examination of the right chest demonstrates scant pleural effusion.      Scant Right pleural effusion, no thoracentesis indicated.  MACRO: None    MR lumbar spine w and wo IV contrast    Result Date: 9/19/2023  Interpreted By:  REY TAYLOR MD and DRE GRANT MD MRN: [id number] Patient Name: KELVIN ROB  STUDY: MR L-SPINE WO/W CONTRAST;  9/19/2023 9:59 am  INDICATION: intractable back pain - hx of prostate cancer, DM2 rule out infection, mets .  COMPARISON: CT L-spine 09/17/2023, CT abdomen pelvis 09/17/2023  ACCESSION NUMBER(S): [id number]  ORDERING CLINICIAN: RADHA BALDERRAMA  TECHNIQUE: Sagittal T1, T2, STIR, axial T1 and T2 weighted images of the lumbar spine were acquired. Following administration of 15 mL Dotarem, additional T1 weighted images were obtained.  FINDINGS: For labeling purposes, the last intervertebral body disc space is labeled L5-S1.  Vertebrae: The height, alignment, and signal of the lumbar vertebral bodies are preserved.  Intervertebral Discs: Multilevel intervertebral body disc desiccation. Otherwise, vertebral body heights are overall well-maintained.  Conus medullaris: The lower thoracic cord appears unremarkable. The conus medullaris terminates appropriately at L1.  T12-L1: There is no significant central canal or neural foraminal stenosis.  L1-2: There is no significant central canal or neural foraminal stenosis. Mild ligamentum flavum hypertrophy and facet arthropathy with posterior indentation of the thecal sac.  L2-3: There is no significant central canal or neural foraminal stenosis. Moderate ligamentum flavum hypertrophy and facet arthropathy with posterior indentation of the thecal sac.  L3-4: There is a focus of abnormal signal in the  right-sided L4 pedicle best appreciated on parasagittal T2 weighted image 7/23. There is no appreciable degenerative change in the adjacent facet joint. No associated contrast enhancement. Consider incidental osseous hemangioma, stress fracture as well as osteoid osteoma no measurable central canal stenosis. Moderate ligamentum flavum hypertrophy and facet arthropathy with posterior indentation thecal sac. There is resultant mild bilateral neural foraminal stenosis.  L4-5: There is no significant central canal or neural foraminal stenosis. Moderate ligamentum flavum hypertrophy and facet arthropathy.  L5-S1: There is no significant central canal or neural foraminal stenosis. There is a focus of sclerosis in the S2 vertebral body that can not be further characterized and likely represents a bone island. No associated contrast enhancement.  Bilateral native kidneys are atrophic. There is partial visualization of a left iliac fossa renal transplant.      1. Nonspecific focus of abnormal signal in the right-sided pedicle of L4 can not be further characterized but is consistent with the presence of osseous hemangioma, stress fracture or osteoid osteoma. No evidence of discitis or osteomyelitis. 2. Bone island in the S2 vertebral body 3. No evidence of metastatic disease to the lumbar spine. 4. Mild-to-moderate multilevel degenerative change of the lumbar spine with ligamentum flavum hypertrophy, facet arthropathy, and mild neural foraminal stenosis at L3-L4.  I personally reviewed the images/study, and I agree with the findings as stated above. This study was interpreted at [place], [place].    US right upper quadrant    Result Date: 9/19/2023  Interpreted By:  PALLAVI MCGRAW MD STUDY: Right Upper Quadrant Ultrasound; 9/19/2023 6:35 AM  INDICATION: Biliary colic.  COMPARISON: CT AP 9/17/23, 9/13/23 and XR abdomen 8/22/23.  ACCESSION NUMBER(S): [id number]  ORDERING CLINICIAN:  RADHA BALDERRAMA PA-C  TECHNIQUE: Ultrasound of the Right Upper Quadrant.  FINDINGS: LIVER: The liver appears sonographically normal.  Normal echogenicity.  The liver appears to be homogeneous with no focal lesion   GALLBLADDER: The gallbladder : the gallbladder is mildly contracted.  There is no focal tenderness to gallbladder palpation.  No evidence of stone disease.  The gallbladder wall appears to be mildly prominent but I think this is in large part related to the fact that this is a contracted gallbladder  BILE DUCTS: The common bile duct measures 0.6 cm.  There is no intrahepatic biliary dilatation.   PANCREAS: The pancreas : The pancreas appears to be sonographically normal--- suboptimal visualization of the pancreatic tail--- there may be one or 2 punctate calcifications in the pancreas.  Correlate with any previous history of pancreatitis  RIGHT KIDNEY: The right kidney is surgically absent.  One can see a right pleural effusion and there is a trace amount of free fluid in the abdomen        Suboptimal visualization of the pancreas but what we see appears normal------ there may be one or 2 punctate calcifications of the pancreas.  Correlate with any previous history of pancreatitis  Partially contracted gallbladder but no gross evidence of stone disease or pericholecystic fluid -----no tenderness to gallbladder palpation  Surgically absent kidney  No biliary ductal dilatation   Signed by Zac Dillon MD    CT lumbar spine wo IV contrast    Result Date: 9/17/2023  Interpreted By:  BRITTANY VASQUEZ MD MRN: [id number] Patient Name: KELVIN ROB  STUDY: CT L-SPINE WO CONTRAST  9/17/2023 5:10 pm  INDICATION: diffuse and worsening abd pain, history of renal transplant  COMPARISON: None.  ACCESSION NUMBER(S): [id number]  ORDERING CLINICIAN: ROXANNE LAZO  TECHNIQUE: Axial CT images of the lumbar spine are obtained. Axial, coronal and sagittal reconstructions are provided for review.  FINDINGS:  There are 5 lumbar type non rib-bearing vertebral bodies, with the lowest well-formed intervertebral disc space labeled L5-S1.  Lumbar vertebral alignment is maintained, without significant spondylolisthesis.  Lumbar vertebral body heights are intact, without evidence of compression fractures, although multilevel insufficiency endplate changes with associated Schmorl's nodes are present, most conspicuous along the superior endplates of L1, L2 and L3 and inferior endplate of L3.  Posterior elements of the lumbar spine do not demonstrate any acute abnormalities. No displaced spinous or transverse process fracture or abnormal intra spinous distance widening is present. Low corticated ossific fragment in the expected location of the transverse processes are of T12 are favored to be non fused ossification centers.  Facet joints are preserved without evidence of perching or subluxation.  Mild intervertebral disc height loss is present at L2-L3 and L1-L2.  Although the exam is not optimized to assess the spinal canal, there is likely at least mild-to-moderate spinal canal narrowing possible at the level of L4-L5 due to bulging disc and ligamentum flavum thickening.  Mild neural foraminal stenosis is present at the levels of L3-L4, L4-L5 and L5-S1 due to bulging disc and endplate spurring, worst at L3-L4 on the left.  Paraspinal musculature does not demonstrate any acute abnormalities.      1.  No evidence of acute trauma or high-grade stenosis in the lumbar spine. 2. Multilevel degenerative changes of the lumbar spine, with likely mild spinal canal narrowing present at the level of L4-L5 and mild-to-moderate neural foraminal stenosis present at the levels of L3-L4 due to combination of bulging disc, endplate spurring and hypertrophic facet changes.  MACRO: None    CT abdomen pelvis wo IV contrast    Result Date: 9/17/2023  Interpreted By:  BRITTANY VASQUEZ MD MRN: [id number] Patient Name: KELVNI ROB  STUDY: CT  ABDOMEN AND PELVIS WO CONTRAST;  9/17/2023 5:10 pm  INDICATION: diffuse and worsening abd pain, history of renal transplant .  COMPARISON: CT abdomen and pelvis dated 09/13/2023  ACCESSION NUMBER(S): [id number]  ORDERING CLINICIAN: ROXANNE LAZO  TECHNIQUE: CT of the abdomen and pelvis was performed. Contiguous axial images were obtained at 3 mm slice thickness through the abdomen and pelvis. Coronal and sagittal reconstructions at 3 mm slice thickness were performed.  No intravenous or oral contrast agents were administered.  FINDINGS: Please note that the evaluation of vessels, lymph nodes and organs is limited without intravenous contrast.  LOWER CHEST: In the interim since prior study on 09/13/2023, new large right-sided and moderate to large left-sided pleural effusions are present with associated ground-glass opacities and atelectatic changes in the lower lobes.  Heart is mildly enlarged without pericardial effusion.  Small hiatal hernia is present.  ABDOMEN:  LIVER: Within limits of noncontrast exam liver does not demonstrate any acute abnormalities.  BILE DUCTS: No intrahepatic or extrahepatic biliary dilatation is present.  GALLBLADDER: Gallbladder does not demonstrate any wall thickening or pericholecystic stranding.  PANCREAS: No new pancreatic ductal dilatation or pancreatic stranding is present.  SPLEEN: Spleen is unchanged in appearance to prior study, without evidence of new abnormalities.  ADRENAL GLANDS: Bilateral adrenal glands are unremarkable in appearance.  KIDNEYS AND URETERS: Native kidneys are atrophic without evidence of hydronephrosis or nephrolithiasis. A transplanted kidney is present in the left lower quadrant of the pelvis in the abdomen, and is not demonstrate any radiopaque stones or hydronephrosis.  PELVIS:  BLADDER: Bladder is nondistended and demonstrates mildly thickened wall.  REPRODUCTIVE ORGANS: No pelvic masses are present. Prostate appears to be surgically absent.   BOWEL: Stomach is unremarkable in appearance. Postsurgical changes of partial small-bowel and large bowel resection are evident, without abnormal bowel dilatation or focal inflammatory changes present in the abdomen and pelvis. Several diverticula are present in the remaining sigmoid colon without evidence of acute diverticulitis. Appendix is not definitely identified, although no acute inflammatory changes are present in the right lower quadrant in the its expected location.  VESSELS: There is no aneurysmal dilatation of the abdominal aorta. The IVC appears normal. Scattered atherosclerotic plaques are present in the abdominal aorta.  PERITONEUM/RETROPERITONEUM/LYMPH NODES: Mild nonspecific mesenteric haziness is present in the central abdomen in the epigastric region, slightly more conspicuous compared to prior exam on 09/13/2023, without evidence of fat stranding, free fluid, or thick-walled collections. No free air is identified. No abdominopelvic lymphadenopathy is present.  ABDOMINAL WALL: Soft tissues of the abdominal wall are unremarkable in appearance.  BONES: No suspicious osseous lesions are identified.      1.  Large right-sided and moderate to large left-sided pleural effusions are present in the lungs bilaterally with mild associated ground-glass in the atelectatic changes in the lower lobes, new since prior exam on 09/13/2023. 2. Transplanted kidney is present in the left lower quadrant of the abdomen/pelvis, without evidence of hydronephrosis or nephrolithiasis. 3. Slight nonspecific mesenteric haziness in the epigastric region of the abdomen, new/increased since prior CT on 09/13/2023. Correlate with cardiac function. 4. Postsurgical changes of small and large bowel resection, without evidence of abnormal bowel dilatation or focal inflammatory wall thickening changes in the abdomen or pelvis. 5. Several diverticula are present in the sigmoid colon without evidence of acute diverticulitis. 6. Small  hiatal hernia.   MACRO: None    XR chest 2 view    Result Date: 9/17/2023  Interpreted By:  BOOGIE ALBARADO MD MRN: [id number] Patient Name: KELVIN ROB  STUDY: Chest dated  9/17/2023.  INDICATION: cp sob  COMPARISON: Chest dated 09/13/2020.  ACCESSION NUMBER(S): [id number]  ORDERING CLINICIAN: ROXANNE LAZO  TECHNIQUE: PA and lateral radiograph of the chest.  FINDINGS: There are ill-defined bibasilar opacities most evident in the central right lower lung zone.  No pneumothorax or effusion is evident. The cardiomediastinal silhouette is  enlarged but similar to the prior exam.A stent is seen over the left axilla.      Ill-defined bibasilar opacities which could represent atelectasis and/or pneumonitis.    Electrocardiogram 12 Lead    Result Date: 9/14/2023  Please see ED Provider Note for formal interpretation Confirmed by Aamir Trent ([id number]) on 9/14/2023 4:03:48 AM    CT abdomen pelvis wo IV contrast    Result Date: 9/13/2023  Interpreted By:  FLEX TOLEDO MD and CHETAN BRUNNER MD MRN: [id number] Patient Name: KELVIN ROB  STUDY: CT ABDOMEN AND PELVIS WO CONTRAST;  9/13/2023 5:42 pm  INDICATION: L flank pain, h/o of kidney transplant, Lie Flat: Yes .  COMPARISON: CT chest abdomen pelvis 08/20/2023  ACCESSION NUMBER(S): [id number]  ORDERING CLINICIAN: RAMY DÍAZ  TECHNIQUE: CT of the abdomen and pelvis was performed. Contiguous axial images were obtained at 3 mm slice thickness through the abdomen and pelvis. Coronal and sagittal reconstructions at 3 mm slice thickness were performed.  No intravenous contrast was administered; positive oral contrast was given.  FINDINGS: Please note that the evaluation of vessels, lymph nodes and organs is limited without intravenous contrast.  LOWER CHEST: Resolution of small bilateral pleural effusions. Similar asymmetric enlargement of the left atrium and left ventricle. No significant pericardial effusion. Small sliding hiatal hernia is again noted.   ABDOMEN:  LIVER: The liver is normal in size without evidence of focal liver lesions.  BILE DUCTS: The intrahepatic and extrahepatic ducts are not dilated.  GALLBLADDER: The gallbladder is nondistended and without evidence of radiopaque stones. There is layering hyperdense material within the gallbladder which may relate to biliary sludge.  PANCREAS: The pancreas appears unremarkable without evidence of ductal dilatation or masses.  SPLEEN: The spleen is normal in size without focal lesions.  ADRENAL GLANDS: Bilateral adrenal glands appear normal.  KIDNEYS AND URETERS: Redemonstration of severely atrophic kidneys with intraparenchymal calcifications. No hydroureteronephrosis or nephroureterolithiasis.  Similar appearance of the left iliac fossa transplant kidney with mild perinephric fat stranding. No hydronephrosis or nephroureterolithiasis of the transplant kidney.  PELVIS:  BLADDER: There is mild circumferential mural thickening of the urinary bladder with mild perivesical fat stranding.  REPRODUCTIVE ORGANS: No pelvic masses.  BOWEL: Small hiatal hernia. Postsurgical changes from right hemicolectomy with intact ileocolonic anastomosis in the left upper quadrant. An additional intact small bowel anastomosis is noted in the right upper quadrant. The small and large bowel are normal in caliber without wall thickening. Scattered colonic diverticula without evidence of acute diverticulitis.  VESSELS: Mild atherosclerotic changes of the abdominal aorta and its branches without aneurysmal dilatation. Severe calcifications of the bilateral internal iliac arteries are again noted.  PERITONEUM/RETROPERITONEUM/LYMPH NODES: No ascites or free air, no fluid collection.  No abdominopelvic lymphadenopathy is present.  ABDOMINAL WALL: Similar subcutaneous fat stranding along the left lower rectus abdominus muscle. Mild diffuse body wall edema.  BONES: No suspicious osseous lesions are identified. Degenerative discogenic disease  is noted in the lower thoracic and lumbar spine, most prominently at T10-11 with moderate disc height loss, degenerative endplate changes, and diffuse disc bulge which results moderate left and mild right neural foraminal narrowing.      1. Circumferential urinary bladder wall thickening with perivesical fat stranding which may be seen in the setting of cystitis. 2. Status post left iliac fossa renal transplant with similar mild perinephric fat stranding, nonspecific. 3. Similar mild anasarca. 4. Resolution of small bilateral pleural effusions. 5. Additional findings as above.  I personally reviewed the images/study and I agree with the findings as stated. This study was interpreted at [place], [place].  MACRO: None    CT head wo IV contrast    Result Date: 9/13/2023  Interpreted By:  LORRI MOBLEY MD MRN: [id number] Patient Name: KELVIN ROB  STUDY: CT HEAD WO CONTRAST;  9/13/2023 5:42 pm  INDICATION: headache, Lie Flat: Yes .  COMPARISON: None.  ACCESSION NUMBER(S): [id number]  ORDERING CLINICIAN: RAMY DÍAZ  TECHNIQUE: Noncontrast axial CT scan of head was performed. Angled reformats in brain and bone windows were generated. The images were reviewed in bone, brain, blood and soft tissue windows.  FINDINGS: CSF Spaces: The ventricles, sulci and basal cisterns are within normal limits. There is no extraaxial fluid collection.  Parenchyma: Mild degree of nonspecific white matter hypodensity is compatible with microangiopathy. The grey-white differentiation is intact. There is no mass effect or midline shift.  There is no intracranial hemorrhage.  Calvarium: The calvarium is unremarkable. Intracranial vascular calcifications.  Paranasal sinuses and mastoids: Visualized paranasal sinuses and mastoids are clear.      No acute intracranial hemorrhage or mass effect.  Mild degree of nonspecific white matter hypodensities compatible with microangiopathy.  MACRO: None    XR  chest 2 view    Result Date: 9/13/2023  Interpreted By:  BALJEET HORTON MD STUDY: Chest Radiographs;  9/13/2023 10:32 AM.  INDICATION: Unspecified chest pain.  COMPARISON: Chest radiographs 5/6/2023.  ACCESSION NUMBER(S): [id number]  ORDERING CLINICIAN: JEFFREY VILLAGRAN MD  TECHNIQUE:  Frontal and lateral chest.  FINDINGS:  CARDIOMEDIASTINAL SILHOUETTE: Cardiomediastinal silhouette is normal in size and configuration.  LUNGS: Possible 8 mm pulmonary nodule left upper lobe, not definitively seen on prior exams. No consolidative airspace disease. No pleural effusion. No pneumothorax.  ABDOMEN: No remarkable upper abdominal findings.  BONES: No acute osseous changes.      Possible 8 mm pulmonary nodule left upper lobe, not definitively seen on prior exams. Follow-up chest CT advised.   Signed by Baljeet Horton MD        Physical Exam     Constitutional   General appearance: Alert     Pulmonary   Respiratory assessment: No respiratory distress, normal respiratory rhythm and effort.    Auscultation of Lungs: Clear bilateral breath sounds.   Cardiovascular   Auscultation of heart: Apical pulse normal, heart rate and rhythm normal, normal S1 and S2, no murmurs and no pericardial rub.    Exam for edema: No peripheral edema.   Abdomen   Abdominal Exam: tenderness, suprapubic discomfort  Back nontender  Musculoskeletal   Examination of gait: Normal.    Inspection of digits and nails: No clubbing or cyanosis of the fingernails.    Inspection/palpation of joints, bones and muscles: No joint swelling. Normal movement of all extremities.   Skin   Skin inspection: Normal skin color and pigmentation, normal skin turgor and no visible rash.   Neurologic   Cranial nerves: Nerves 2-12 were intact, no focal neuro defects.     Assessment/Plan      #Abdominal pain  Needs regular IV pain medications  I did discuss with Dr. Pickering who said he will take a look at him later    #Suprapubic discomfort  Bladder ultrasound showed 60 cc  postvoid residual  UA is negative for infection    #Acute on chronic kidney injury  History of renal transplant  Cont with monitoring, renal function at baseline    #Hypertension  Stable    #Pancytopenia  Monitor numbers  No active signs of bleeding  Might need hematological work-up  However can be done as outpt    Lul Aldana MD

## 2023-10-09 NOTE — CARE PLAN
The patient's goals for the shift include      The clinical goals for the shift include pt antonio report improved pain after medication, heat, and acitivity by end of shift

## 2023-10-10 LAB
ANION GAP SERPL CALC-SCNC: 7 MMOL/L (ref 10–20)
BUN SERPL-MCNC: 22 MG/DL (ref 6–23)
CALCIUM SERPL-MCNC: 10.1 MG/DL (ref 8.6–10.3)
CHLORIDE SERPL-SCNC: 108 MMOL/L (ref 98–107)
CO2 SERPL-SCNC: 24 MMOL/L (ref 21–32)
CREAT SERPL-MCNC: 2.53 MG/DL (ref 0.5–1.3)
ERYTHROCYTE [DISTWIDTH] IN BLOOD BY AUTOMATED COUNT: 15.8 % (ref 11.5–14.5)
GFR SERPL CREATININE-BSD FRML MDRD: 27 ML/MIN/1.73M*2
GLUCOSE BLD MANUAL STRIP-MCNC: 122 MG/DL (ref 74–99)
GLUCOSE BLD MANUAL STRIP-MCNC: 145 MG/DL (ref 74–99)
GLUCOSE BLD MANUAL STRIP-MCNC: 154 MG/DL (ref 74–99)
GLUCOSE BLD MANUAL STRIP-MCNC: 173 MG/DL (ref 74–99)
GLUCOSE SERPL-MCNC: 171 MG/DL (ref 74–99)
HCT VFR BLD AUTO: 23 % (ref 41–52)
HGB BLD-MCNC: 7.8 G/DL (ref 13.5–17.5)
MCH RBC QN AUTO: 27.8 PG (ref 26–34)
MCHC RBC AUTO-ENTMCNC: 33.9 G/DL (ref 32–36)
MCV RBC AUTO: 82 FL (ref 80–100)
NRBC BLD-RTO: 0 /100 WBCS (ref 0–0)
PLATELET # BLD AUTO: 96 X10*3/UL (ref 150–450)
PMV BLD AUTO: 9.2 FL (ref 7.5–11.5)
POTASSIUM SERPL-SCNC: 4.2 MMOL/L (ref 3.5–5.3)
RBC # BLD AUTO: 2.81 X10*6/UL (ref 4.5–5.9)
SODIUM SERPL-SCNC: 135 MMOL/L (ref 136–145)
WBC # BLD AUTO: 2.5 X10*3/UL (ref 4.4–11.3)

## 2023-10-10 PROCEDURE — 72148 MRI LUMBAR SPINE W/O DYE: CPT | Performed by: RADIOLOGY

## 2023-10-10 PROCEDURE — 96372 THER/PROPH/DIAG INJ SC/IM: CPT | Performed by: NURSE PRACTITIONER

## 2023-10-10 PROCEDURE — 2500000002 HC RX 250 W HCPCS SELF ADMINISTERED DRUGS (ALT 637 FOR MEDICARE OP, ALT 636 FOR OP/ED): Performed by: NURSE PRACTITIONER

## 2023-10-10 PROCEDURE — 2500000004 HC RX 250 GENERAL PHARMACY W/ HCPCS (ALT 636 FOR OP/ED): Performed by: NURSE PRACTITIONER

## 2023-10-10 PROCEDURE — 80048 BASIC METABOLIC PNL TOTAL CA: CPT | Performed by: INTERNAL MEDICINE

## 2023-10-10 PROCEDURE — 2500000004 HC RX 250 GENERAL PHARMACY W/ HCPCS (ALT 636 FOR OP/ED): Performed by: FAMILY MEDICINE

## 2023-10-10 PROCEDURE — 36415 COLL VENOUS BLD VENIPUNCTURE: CPT | Performed by: INTERNAL MEDICINE

## 2023-10-10 PROCEDURE — 72146 MRI CHEST SPINE W/O DYE: CPT | Performed by: RADIOLOGY

## 2023-10-10 PROCEDURE — 1100000001 HC PRIVATE ROOM DAILY

## 2023-10-10 PROCEDURE — 2500000005 HC RX 250 GENERAL PHARMACY W/O HCPCS: Performed by: NURSE PRACTITIONER

## 2023-10-10 PROCEDURE — 99222 1ST HOSP IP/OBS MODERATE 55: CPT | Performed by: ANESTHESIOLOGY

## 2023-10-10 PROCEDURE — 2500000004 HC RX 250 GENERAL PHARMACY W/ HCPCS (ALT 636 FOR OP/ED): Performed by: INTERNAL MEDICINE

## 2023-10-10 PROCEDURE — 2500000001 HC RX 250 WO HCPCS SELF ADMINISTERED DRUGS (ALT 637 FOR MEDICARE OP): Performed by: NURSE PRACTITIONER

## 2023-10-10 PROCEDURE — 85027 COMPLETE CBC AUTOMATED: CPT | Performed by: INTERNAL MEDICINE

## 2023-10-10 PROCEDURE — 82947 ASSAY GLUCOSE BLOOD QUANT: CPT

## 2023-10-10 PROCEDURE — 2500000001 HC RX 250 WO HCPCS SELF ADMINISTERED DRUGS (ALT 637 FOR MEDICARE OP): Performed by: FAMILY MEDICINE

## 2023-10-10 RX ADMIN — BENZONATATE 100 MG: 100 CAPSULE ORAL at 15:31

## 2023-10-10 RX ADMIN — Medication 50 MCG: at 10:18

## 2023-10-10 RX ADMIN — CARVEDILOL 25 MG: 25 TABLET, FILM COATED ORAL at 20:51

## 2023-10-10 RX ADMIN — TACROLIMUS 3 MG: 1 CAPSULE ORAL at 06:12

## 2023-10-10 RX ADMIN — PRAVASTATIN SODIUM 40 MG: 40 TABLET ORAL at 20:51

## 2023-10-10 RX ADMIN — CARVEDILOL 25 MG: 25 TABLET, FILM COATED ORAL at 10:21

## 2023-10-10 RX ADMIN — INSULIN LISPRO 2 UNITS: 100 INJECTION, SOLUTION INTRAVENOUS; SUBCUTANEOUS at 10:21

## 2023-10-10 RX ADMIN — BENZONATATE 100 MG: 100 CAPSULE ORAL at 10:19

## 2023-10-10 RX ADMIN — PREDNISONE 5 MG: 5 TABLET ORAL at 10:20

## 2023-10-10 RX ADMIN — HYDROMORPHONE HYDROCHLORIDE 0.4 MG: 1 INJECTION, SOLUTION INTRAMUSCULAR; INTRAVENOUS; SUBCUTANEOUS at 14:50

## 2023-10-10 RX ADMIN — FERROUS SULFATE TAB 325 MG (65 MG ELEMENTAL FE) 65 MG OF IRON: 325 (65 FE) TAB at 20:51

## 2023-10-10 RX ADMIN — FERROUS SULFATE TAB 325 MG (65 MG ELEMENTAL FE) 65 MG OF IRON: 325 (65 FE) TAB at 10:19

## 2023-10-10 RX ADMIN — SODIUM CHLORIDE 75 ML/HR: 9 INJECTION, SOLUTION INTRAVENOUS at 06:12

## 2023-10-10 RX ADMIN — PANTOPRAZOLE SODIUM 40 MG: 40 TABLET, DELAYED RELEASE ORAL at 06:12

## 2023-10-10 RX ADMIN — HYDROMORPHONE HYDROCHLORIDE 0.4 MG: 1 INJECTION, SOLUTION INTRAMUSCULAR; INTRAVENOUS; SUBCUTANEOUS at 10:17

## 2023-10-10 RX ADMIN — AZATHIOPRINE 50 MG: 50 TABLET ORAL at 10:19

## 2023-10-10 RX ADMIN — HYDRALAZINE HYDROCHLORIDE 25 MG: 25 TABLET ORAL at 10:19

## 2023-10-10 RX ADMIN — HYDRALAZINE HYDROCHLORIDE 25 MG: 25 TABLET ORAL at 15:31

## 2023-10-10 RX ADMIN — HYDROMORPHONE HYDROCHLORIDE 0.4 MG: 1 INJECTION, SOLUTION INTRAMUSCULAR; INTRAVENOUS; SUBCUTANEOUS at 19:03

## 2023-10-10 RX ADMIN — INSULIN GLARGINE 10 UNITS: 100 INJECTION, SOLUTION SUBCUTANEOUS at 10:22

## 2023-10-10 RX ADMIN — AMLODIPINE BESYLATE 10 MG: 10 TABLET ORAL at 10:20

## 2023-10-10 RX ADMIN — LIDOCAINE 1 PATCH: 4 PATCH TOPICAL at 10:18

## 2023-10-10 RX ADMIN — GABAPENTIN 100 MG: 100 CAPSULE ORAL at 20:51

## 2023-10-10 RX ADMIN — TACROLIMUS 3 MG: 1 CAPSULE ORAL at 17:40

## 2023-10-10 RX ADMIN — BENZONATATE 100 MG: 100 CAPSULE ORAL at 20:51

## 2023-10-10 RX ADMIN — SODIUM CHLORIDE 75 ML/HR: 9 INJECTION, SOLUTION INTRAVENOUS at 19:03

## 2023-10-10 RX ADMIN — HYDROMORPHONE HYDROCHLORIDE 0.4 MG: 1 INJECTION, SOLUTION INTRAMUSCULAR; INTRAVENOUS; SUBCUTANEOUS at 06:12

## 2023-10-10 RX ADMIN — HYDRALAZINE HYDROCHLORIDE 25 MG: 25 TABLET ORAL at 20:51

## 2023-10-10 ASSESSMENT — COGNITIVE AND FUNCTIONAL STATUS - GENERAL
MOBILITY SCORE: 24
DAILY ACTIVITIY SCORE: 24
MOBILITY SCORE: 24
DAILY ACTIVITIY SCORE: 24

## 2023-10-10 ASSESSMENT — PAIN SCALES - GENERAL: PAINLEVEL_OUTOF10: 0 - NO PAIN

## 2023-10-10 NOTE — PROGRESS NOTES
"INPATIENT PROGRESS NOTES    PRIMARY SERVICE: Lul Aldana MD         10/10/2023  11 36am    INTERVAL HPI: Pt feels pain unchanged  Wants more pain medication at night time \"so I can sleep\"  Per pt back pain controlled with lidocaine patch    PERTINENT ROS:  REVIEW OF SYSTEMS  GENERAL: negative for fever, SEE HPI  RESPIRATORY: Negative for cough, wheezing or shortness of breath.  CARDIOVASCULAR: Negative for chest pain, leg swelling or palpitations.  GI: does have abdo pain   NEURO: no change per nursing  All other reviewed and negative other than HPI.      MEDICATIONS:    No current facility-administered medications on file prior to encounter.     Current Outpatient Medications on File Prior to Encounter   Medication Sig Dispense Refill    acetaminophen (Tylenol) 325 mg tablet TAKE 2 TABLETS BY MOUTH EVERY 6 HOURS AS NEEDED FOR MILD TO MODERATE PAIN 112 tablet 0    amLODIPine (Norvasc) 10 mg tablet       azaTHIOprine (Imuran) 50 mg tablet Take 1 tablet (50 mg) by mouth once daily.      Basaglar KwikPen U-100 Insulin 100 unit/mL (3 mL) pen INJECT 15 UNITS SUBCUTANEOUSLY EVERY DAY IN THE MORNING AS DIRECTED 3 mL 10    carvedilol (Coreg) 25 mg tablet Take 1 tablet (25 mg) by mouth 2 times a day.      docusate sodium (Colace) 100 mg capsule TAKE 1 CAPSULE BY MOUTH TWO TIMES A DAY TO PREVENT CONSTIPATION 14 capsule 0    FeroSuL 325 mg (65 mg iron) tablet Take 1 tablet (65 mg of iron) by mouth 2 times a day.      fluocinonide (Lidex) 0.05 % ointment 1 APPLICATION DAILY TOPICALLY USE ON SCALP NIGHTLY (Patient not taking: Reported on 10/3/2023) 90 g 1    hydrALAZINE (Apresoline) 25 mg tablet Take 1 tablet (25 mg) by mouth 3 times a day. 1 Tablet by mouth 3 times daily \"Only if blood pressure is above 150/90\"      insulin lispro (HumaLOG) 100 unit/mL injection INJECT UP TO 5 UNITS SUBCUTANEOUSLY THREE TIMES A DAY WITH MEALS PER SLIDING SCALE AS DIRECTED      ketoconazole (NIZOral) 2 % shampoo 1 APPLICATION DAILY TOPICALLY " "PT Name: Naty St  MR #: [id number]    Physician Query Form - Nutrition Clarification     CDS/: Eleni Solomon               Contact information:     This form is a permanent document in the medical record.     Query Date: October 10, 2017    By submitting this query, we are merely seeking further clarification of documentation.. Please utilize your independent clinical judgment when addressing the question(s) below.    The Medical record contains the following:   Indicators  Supporting Clinical Findings Location in Medical Record   x % of Estimated Energy Intake over a time frame from p.o., TF, or TPN Energy Calories Required: not meeting needs  Protein Required: not meeting needs  Fluid Required: not meeting needs   RD CN 10/9    Weight Status over a time frame      Subcutaneous Fat and/or Muscle Loss      Fluid Accumulation or Edema      Reduced  Strength     x Wt / BMI / Usual Body Weight Height Method: Stated  Height: 5' 5.5" (166.4 cm)    Weight Method: Bed Scale  Weight: 63.5 kg (139 lb 15.9 oz)     Ideal Body Weight (IBW), Female: 127.5 lb   % Ideal Body Weight, Female (lb): 109.8 lb    BMI (Calculated): 23  BMI Grade: 18.5-24.9 - normal    RD CN 10/9    Delayed Wound Healing / Failure to Thrive     x Acute or Chronic Illness R cerebellar infarct  Speech difficulty  Dysrthria  Mild Cognitive Linguistic Impairment    Relevent Medical History: Seizures, Psychiatric Problems, Pancreatic CA, HTN      Daniel PN 10/10        RD CN 10/9   x Medication States she takes Marinol x2/daily to increase appetite.    RD CN 10/9   x Treatment Recommendation/Intervention:   1.  Continue Rx diet    2.  Add Boost prn     3.  Encourage/assist with PO intake    4.  MD to Rx appetite stimulant     5.  RD to monitor and follow     Current Diet Order: Regular   Nutrition Order Comments: Pt has not consumed any meal since admit secondary to patient not fully alert and very groggy.     Assessment and Plan   P: " FOR 1 MONTH. THEN LATHER ON SCALP EVERY FEW DAYS AS NEEDED-LET SIT FOR 4 MINUTES BEFORE WASHING OFF. (Patient not taking: Reported on 10/3/2023) 120 mL 3    losartan (Cozaar) 25 mg tablet Take 1 tablet (25 mg) by mouth once daily.      pantoprazole (ProtoNix) 40 mg EC tablet Take 1 tablet (40 mg) by mouth once daily in the morning. Take before meals. Do not crush, chew, or split.      permethrin (Elimite) 5 % cream APPLY 1 APPLICATION TOPICALLY DAILY USE ON YOUR SCALP ONCE, LEAVE ON OVERNIGHT, REPEAT ONE WEEK LATER, WASH OFF IN THE MORNING (Patient not taking: Reported on 10/3/2023) 60 g 0    pravastatin (Pravachol) 40 mg tablet Take 1 tablet (40 mg) by mouth once daily at bedtime.      predniSONE (Deltasone) 5 mg tablet Take 1 tablet (5 mg) by mouth once daily in the morning.      tacrolimus (Prograf) 1 mg capsule Take 3 capsule AM 3 Capsule PM      tacrolimus (Protopic) 0.1 % ointment APPLY TO AFFECTED AREA(S) TOPICALLY ONCE DAILY TO AFFECTED ITCHY/ IRRITATED AREAS (Patient not taking: Reported on 10/3/2023) 60 g 1    Vitamin D3 25 mcg (1,000 unit) capsule Take 2 capsules (50 mcg) by mouth once daily.           PHYSICAL EXAM:   Vitals:    10/09/23 2113 10/10/23 0100 10/10/23 0500 10/10/23 0807   BP: 158/77 159/66 154/70 178/78   BP Location: Right arm Right arm Right arm Right arm   Patient Position: Lying Lying Lying Lying   Pulse: 67 67 64 75   Resp: 18 18 18 18   Temp: 37.1 °C (98.7 °F) 37 °C (98.6 °F) 37.4 °C (99.3 °F) 36.6 °C (97.9 °F)   TempSrc: Oral Oral Oral Oral   SpO2: 95% 98% 95% 92%   Weight:       Height:            PHYSICAL EXAMINATION:    General appearance: 3  Skin: skin color, texture, turgor normal,   HEENT: Anicteric sclera.  Oropharynx mucosa moist  Neck: Supple, no adenopathy;   Back: no pain to palpation over spine or costovertebral angles,   Lungs: clear to auscultation, no wheezing or rhonchi  Heart: RRR without murmur, gallop, or rubs.   Abdomen: Abdomen soft, non-tender. Bowel sounds  Inadequate nutritional intake  E: Related to diagnosis  S: as evidenced by patient interview; PO intake  Status:  NEW    Nutrition Risk      Level of Risk: other (see comments) (follow up x2/week)     Nutrition Follow-Up     RD Follow-up?: Yes     RD CN 10/9   x Other · Pt has been seen by outpatient RD regarding CA meal planning.    · States she takes Marinol x2/daily to increase appetite.    · Had been eating well prior to admit    Overall Physical Appearance: weak, listlessness   Oral/Mouth Cavity: dental applicance present (specify), tooth/teeth missing (Dentures at home )  Skin: intact   RD CN 10/9     AND / [facility] Clinical Characteristics (October 2011)  A minimum of two characteristics is recommended for diagnosing either moderate or severe malnutrition   Mild Malnutrition Moderate Malnutrition Severe Malnutrition   Energy Intake from p.o., TF or TPN. < 75% intake of estimated energy needs for less than 7 days < 75% intake of estimated energy needs for greater than 7 days < 50% intake of estimated energy needs for > 5 days   Weight Loss 1-2% in 1 month  5% in 3 months  7.5% in 6 months  10% in 1 year 1-2 % in 1 week  5% in 1 month  7.5% in 3 months  10% in 6 months  20% in 1 year > 2% in 1 week  > 5% in 1 month  > 7.5% in 3 months  > 10% in 6 months  > 20% in 1 year   Physical Findings     None *Mild subcutaneous fat and/or muscle loss  *Mild fluid accumulation  *Stage II decubitus  *Surgical wound or non-healing wound *Mod/severe subcutaneous fat and/or muscle loss  *Mod/severe fluid accumulation  *Stage III or IV decubitus  *Non-healing surgical wound     Provider, please specify diagnosis or diagnoses associated with above clinical findings.    [ ] Mild Protein-Calorie Malnutrition  [ ] Moderate Protein-Calorie Malnutrition  [ ] Other Nutritional Diagnosis (please specify): ____________________________________  [ X] Other: She has hx of cancer with metastasis   [ ] Clinically Undetermined    Please document  in your progress notes daily for the duration of treatment until resolved and include in your discharge summary.       normal. No masses, organomegaly  Extremities: Extremities normal. No  edema, or skin discoloration.   Musculoskeletal: Spine range of motion normal. Muscular strength intact  Neuro: Oriented X  3    DATA: CBC, Coags, BMP, Mg, Phos   Results for orders placed or performed during the hospital encounter of 10/02/23 (from the past 96 hour(s))   POCT GLUCOSE   Result Value Ref Range    POCT Glucose 142 (H) 74 - 99 mg/dL   POCT GLUCOSE   Result Value Ref Range    POCT Glucose 203 (H) 74 - 99 mg/dL   POCT GLUCOSE   Result Value Ref Range    POCT Glucose 169 (H) 74 - 99 mg/dL   CBC   Result Value Ref Range    WBC 2.5 (L) 4.4 - 11.3 x10*3/uL    nRBC 0.0 0.0 - 0.0 /100 WBCs    RBC 3.17 (L) 4.50 - 5.90 x10*6/uL    Hemoglobin 8.7 (L) 13.5 - 17.5 g/dL    Hematocrit 25.6 (L) 41.0 - 52.0 %    MCV 81 80 - 100 fL    MCH 27.4 26.0 - 34.0 pg    MCHC 34.0 32.0 - 36.0 g/dL    RDW 15.9 (H) 11.5 - 14.5 %    Platelets 98 (L) 150 - 450 x10*3/uL    MPV 8.6 7.5 - 11.5 fL   Basic Metabolic Panel   Result Value Ref Range    Glucose 149 (H) 74 - 99 mg/dL    Sodium 134 (L) 136 - 145 mmol/L    Potassium 4.2 3.5 - 5.3 mmol/L    Chloride 103 98 - 107 mmol/L    Bicarbonate 24 21 - 32 mmol/L    Anion Gap 11 10 - 20 mmol/L    Urea Nitrogen 19 6 - 23 mg/dL    Creatinine 2.61 (H) 0.50 - 1.30 mg/dL    eGFR 26 (L) >60 mL/min/1.73m*2    Calcium 10.0 8.6 - 10.3 mg/dL   POCT GLUCOSE   Result Value Ref Range    POCT Glucose 144 (H) 74 - 99 mg/dL   POCT GLUCOSE   Result Value Ref Range    POCT Glucose 142 (H) 74 - 99 mg/dL   POCT GLUCOSE   Result Value Ref Range    POCT Glucose 135 (H) 74 - 99 mg/dL   POCT GLUCOSE   Result Value Ref Range    POCT Glucose 206 (H) 74 - 99 mg/dL   CBC   Result Value Ref Range    WBC 2.3 (L) 4.4 - 11.3 x10*3/uL    nRBC 0.0 0.0 - 0.0 /100 WBCs    RBC 2.90 (L) 4.50 - 5.90 x10*6/uL    Hemoglobin 8.0 (L) 13.5 - 17.5 g/dL    Hematocrit 23.6 (L) 41.0 - 52.0 %    MCV 81 80 - 100 fL    MCH 27.6 26.0 - 34.0 pg    MCHC 33.9 32.0 - 36.0 g/dL     RDW 15.4 (H) 11.5 - 14.5 %    Platelets 90 (L) 150 - 450 x10*3/uL    MPV 9.1 7.5 - 11.5 fL   Basic Metabolic Panel   Result Value Ref Range    Glucose 168 (H) 74 - 99 mg/dL    Sodium 134 (L) 136 - 145 mmol/L    Potassium 4.4 3.5 - 5.3 mmol/L    Chloride 105 98 - 107 mmol/L    Bicarbonate 25 21 - 32 mmol/L    Anion Gap 8 (L) 10 - 20 mmol/L    Urea Nitrogen 17 6 - 23 mg/dL    Creatinine 2.56 (H) 0.50 - 1.30 mg/dL    eGFR 27 (L) >60 mL/min/1.73m*2    Calcium 9.8 8.6 - 10.3 mg/dL   POCT GLUCOSE   Result Value Ref Range    POCT Glucose 142 (H) 74 - 99 mg/dL   POCT GLUCOSE   Result Value Ref Range    POCT Glucose 203 (H) 74 - 99 mg/dL   POCT GLUCOSE   Result Value Ref Range    POCT Glucose 152 (H) 74 - 99 mg/dL   POCT GLUCOSE   Result Value Ref Range    POCT Glucose 152 (H) 74 - 99 mg/dL   CBC   Result Value Ref Range    WBC 2.7 (L) 4.4 - 11.3 x10*3/uL    nRBC 0.0 0.0 - 0.0 /100 WBCs    RBC 2.86 (L) 4.50 - 5.90 x10*6/uL    Hemoglobin 7.9 (L) 13.5 - 17.5 g/dL    Hematocrit 23.1 (L) 41.0 - 52.0 %    MCV 81 80 - 100 fL    MCH 27.6 26.0 - 34.0 pg    MCHC 34.2 32.0 - 36.0 g/dL    RDW 15.6 (H) 11.5 - 14.5 %    Platelets 98 (L) 150 - 450 x10*3/uL    MPV 9.3 7.5 - 11.5 fL   Basic Metabolic Panel   Result Value Ref Range    Glucose 142 (H) 74 - 99 mg/dL    Sodium 134 (L) 136 - 145 mmol/L    Potassium 4.2 3.5 - 5.3 mmol/L    Chloride 106 98 - 107 mmol/L    Bicarbonate 24 21 - 32 mmol/L    Anion Gap 8 (L) 10 - 20 mmol/L    Urea Nitrogen 19 6 - 23 mg/dL    Creatinine 2.51 (H) 0.50 - 1.30 mg/dL    eGFR 27 (L) >60 mL/min/1.73m*2    Calcium 9.6 8.6 - 10.3 mg/dL   POCT GLUCOSE   Result Value Ref Range    POCT Glucose 152 (H) 74 - 99 mg/dL   POCT GLUCOSE   Result Value Ref Range    POCT Glucose 136 (H) 74 - 99 mg/dL   POCT GLUCOSE   Result Value Ref Range    POCT Glucose 166 (H) 74 - 99 mg/dL   POCT GLUCOSE   Result Value Ref Range    POCT Glucose 223 (H) 74 - 99 mg/dL   CBC   Result Value Ref Range    WBC 2.5 (L) 4.4 - 11.3 x10*3/uL     nRBC 0.0 0.0 - 0.0 /100 WBCs    RBC 2.81 (L) 4.50 - 5.90 x10*6/uL    Hemoglobin 7.8 (L) 13.5 - 17.5 g/dL    Hematocrit 23.0 (L) 41.0 - 52.0 %    MCV 82 80 - 100 fL    MCH 27.8 26.0 - 34.0 pg    MCHC 33.9 32.0 - 36.0 g/dL    RDW 15.8 (H) 11.5 - 14.5 %    Platelets 96 (L) 150 - 450 x10*3/uL    MPV 9.2 7.5 - 11.5 fL   Basic Metabolic Panel   Result Value Ref Range    Glucose 171 (H) 74 - 99 mg/dL    Sodium 135 (L) 136 - 145 mmol/L    Potassium 4.2 3.5 - 5.3 mmol/L    Chloride 108 (H) 98 - 107 mmol/L    Bicarbonate 24 21 - 32 mmol/L    Anion Gap 7 (L) 10 - 20 mmol/L    Urea Nitrogen 22 6 - 23 mg/dL    Creatinine 2.53 (H) 0.50 - 1.30 mg/dL    eGFR 27 (L) >60 mL/min/1.73m*2    Calcium 10.1 8.6 - 10.3 mg/dL   POCT GLUCOSE   Result Value Ref Range    POCT Glucose 154 (H) 74 - 99 mg/dL           ASSESSMENT AND PLAN:     Principal Problem:    Generalized abdominal pain    #Abdominal pain persists  Needs regular IV pain medications  Input from GI and pain management noted  Cont regimen as per pain mngt  +/- epidural later this week?     #Suprapubic discomfort  Bladder ultrasound showed 60 cc postvoid residual  UA is negative for infection     #Acute on chronic kidney injury  History of renal transplant  Cont with monitoring, renal function at baseline     #Hypertension  Stable     #Pancytopenia  Monitor numbers  No active signs of bleeding  Might need hematological work-up  However can be done as outpt      Plan of care discussed with: Provider, RN, Patient.      Patient case and plan of care discussed with Dr. KRISTA Aldana.    ANÍBAL Chapman - CNP  -In collaboration with Dr. KRISTA Aldana    [facility]  Office: [phone]  Fax: [phone]  Pt seen this eveing   Noted input from pain management   However pt keeps on having pain in abdomen  Says touching the abdominal wall increases the pain   No fever  No chills  Abdo soft bs active,   Back nontender  Labs noted  A/p as above   MRI  of thoracic and lumbar spine pending   Cont with current dose of dilaudid  Lul Aldana MD

## 2023-10-10 NOTE — CARE PLAN
The patient's goals for the shift include  pt will remain safe throughout the shift.    The clinical goals for the shift include pt antonio report improved pain after medication, heat, and acitivity by end of shift    Over the shift, the patient did not make progress toward the following goals. Barriers to progression include . Recommendations to address these barriers include .

## 2023-10-10 NOTE — CONSULTS
Reason for Consult:  Abdominal Pain    History Of Present Illness  Manolo Bashir is a 68 y.o. male  past medical history disease status post kidney transplant -  now CKD 3,  hep C status post Harvoni, prostate cancer status post radical prostatectomy, hypertension, diabetes presents to [facility] for 2 weeks complaint of epigastric abdominal pain associated with nausea and vomiting.   Patient had lap eduarda on 9/24/2023 without any complications and pathology consistent with chronic cholecystitis.   Since discharge patient endorsing abdominal pain  that is worse  eating and laying flat.  He also has nausea and vomiting first thing in the morning  with consumption of food.   CT abdomen pelvis largely unremarkable showing postoperative changes, with no evidence of bowel obstruction or free air.   General surgery was consulted  with unclear etiology of  abdominal pain and recommended observation.  KUB was also largely unremarkable.   Patient also has MRI of spine due to history of prostate cancer results show mild to moderate multilevel degenerative changes lumbar spine with ligamentum flavum hypertrophy facet arthropathy and mild neuroforaminal stenosis at L3-4.   Patient states he has mild back pain however his pain is primarily all in his abdomen.  While inpatient patient has been getting hydromorphone 0.4 mg IV every 4 hours as needed which she states helps his pain.  He also gets lidocaine patches which has helps as well as Tylenol 650 mg   As needed, as well as 100 mg of gabapentin at night. The pain causes significant stress in the patient's life, specifically interferes with general activity, mood, walking ability, ability to perform tasks at home and/or work. Denies any bowel or bladder incontinence, saddle anesthesia, worsening pain, weakness or falls.     Past Medical History  He has a past medical history of Chronic kidney disease, stage 3 unspecified (CMS/[facility]) (09/26/2018), COVID-19 (06/18/2020),  Other long term (current) drug therapy (07/20/2021), Personal history of other diseases of the circulatory system, Personal history of other infectious and parasitic diseases (08/17/2015), and Unspecified kidney failure (08/17/2016).    Surgical History  He has a past surgical history that includes Prostatectomy (10/11/2013); Ileostomy (04/25/2017); Other surgical history (04/21/2017); Ileostomy closure (08/17/2015); Other surgical history (08/17/2015); Other surgical history (08/17/2015); Other surgical history (08/17/2015); and US guided percutaneous peritoneal or retroperitoneal fluid collection drainage (10/20/2022).     Social History  He reports that he has never smoked. He has never used smokeless tobacco. He reports current alcohol use. He reports current drug use. Drug: Oxycodone.    Family History  Family History   Problem Relation Name Age of Onset    Bone cancer Mother      Other (corona's sarcome of the bone marrow) Mother      Prostate cancer Father      Diabetes Other Family Hist     Hypertension Other Family Hist         Allergies  Patient has no known allergies.    Review of Symptoms:   Constitutional: Negative for chills, diaphoresis or fever  HENT: Negative for neck swelling  Eyes:.  Negative for eye pain  Respiratory:.  Negative for cough, shortness of breath or wheezing    Cardiovascular:.  Negative for chest pain or palpitations  Gastrointestinal:. abdominal pain, nausea and vomiting  Genitourinary:.  Negative for urgency  Musculoskeletal:  Positive for back pain. Positive for joint pain. Denies falls within the past 3 months.  Skin: Negative for wounds or itching   Neurological: Negative for dizziness, seizures, loss of consciousness and weakness  Endo/Heme/Allergies: Does not bruise/bleed easily  Psychiatric/Behavioral: Negative for depression. The patient does not appear anxious.       PHYSICAL EXAM  Vitals signs reviewed  Constitutional:       General: Not in acute distress     Appearance:  "Normal appearance. Not ill-appearing.  HENT:     Head: Normocephalic and atraumatic  Eyes:     Conjunctiva/sclera: Conjunctivae normal  Cardiovascular:     Rate and Rhythm: Normal rate and regular rhythm  Pulmonary:     Effort: No respiratory distress  Abdominal:     Palpations: Abdominal pain.   Soft,  tender to palpation no rebound  Musculoskeletal: PICHARDO  Skin:     General: Skin is warm and dry  Neurological:     General: No focal deficit present  Psychiatric:         Mood and Affect: Mood normal         Behavior: Behavior normal       Last Recorded Vitals  /78 (BP Location: Right arm, Patient Position: Lying)   Pulse 75   Temp 36.6 °C (97.9 °F) (Oral)   Resp 18   Wt 72.6 kg (160 lb)   SpO2 92%     Relevant Results  Current Outpatient Medications   Medication Instructions    acetaminophen (Tylenol) 325 mg tablet TAKE 2 TABLETS BY MOUTH EVERY 6 HOURS AS NEEDED FOR MILD TO MODERATE PAIN    amLODIPine (Norvasc) 10 mg tablet     azaTHIOprine (Imuran) 50 mg tablet 1 tablet, oral, Daily    Basaglar KwikPen U-100 Insulin 100 unit/mL (3 mL) pen INJECT 15 UNITS SUBCUTANEOUSLY EVERY DAY IN THE MORNING AS DIRECTED    carvedilol (COREG) 25 mg, oral, 2 times daily    docusate sodium (Colace) 100 mg capsule TAKE 1 CAPSULE BY MOUTH TWO TIMES A DAY TO PREVENT CONSTIPATION    FeroSuL 325 mg (65 mg iron) tablet 65 mg of iron, oral, 2 times daily    fluocinonide (Lidex) 0.05 % ointment 1 APPLICATION DAILY TOPICALLY USE ON SCALP NIGHTLY    hydrALAZINE (APRESOLINE) 25 mg, oral, 3 times daily, 1 Tablet by mouth 3 times daily \"Only if blood pressure is above 150/90\"    insulin lispro (HumaLOG) 100 unit/mL injection INJECT UP TO 5 UNITS SUBCUTANEOUSLY THREE TIMES A DAY WITH MEALS PER SLIDING SCALE AS DIRECTED    ketoconazole (NIZOral) 2 % shampoo 1 APPLICATION DAILY TOPICALLY FOR 1 MONTH. THEN LATHER ON SCALP EVERY FEW DAYS AS NEEDED-LET SIT FOR 4 MINUTES BEFORE WASHING OFF.    losartan (COZAAR) 25 mg, oral, Daily    " pantoprazole (PROTONIX) 40 mg, oral, Daily before breakfast, Do not crush, chew, or split.    permethrin (Elimite) 5 % cream APPLY 1 APPLICATION TOPICALLY DAILY USE ON YOUR SCALP ONCE, LEAVE ON OVERNIGHT, REPEAT ONE WEEK LATER, WASH OFF IN THE MORNING    pravastatin (PRAVACHOL) 40 mg, oral, Nightly    predniSONE (DELTASONE) 5 mg, oral, Every morning    tacrolimus (Prograf) 1 mg capsule Take 3 capsule AM 3 Capsule PM<BR>    tacrolimus (Protopic) 0.1 % ointment APPLY TO AFFECTED AREA(S) TOPICALLY ONCE DAILY TO AFFECTED ITCHY/ IRRITATED AREAS    Vitamin D3 50 mcg, oral, Daily       No results found for this or any previous visit from the past 1000 days.     No image results found.     1. Generalized abdominal pain  Vascular US mesenteric artery duplex complete    Vascular US mesenteric artery duplex complete      2. Lower abdominal pain, unspecified  Vascular US mesenteric artery duplex complete           ASSESSMENT/PLAN  Manolo aBshir is a 68 y.o. male past medical history disease status post kidney transplant -  now CKD 3,  hep C status post Harvoni, prostate cancer status post radical prostatectomy, hypertension, diabetes presents to [facility] for 2 week complaint of epigastric abdominal pain associated with nausea and vomiting. Patient had lap eduarda on 9/24/2023 without any complications and pathology consistent with chronic cholecystitis.  CT abdomen pelvis largely unremarkable showing postoperative changes, with no evidence of bowel obstruction or free air.   General surgery was consulted  with unclear etiology of  abdominal pain and recommended observation.  KUB was also largely unremarkable.  patient is has sensory.  At this time unclear etiology of abdominal pain and recommend GI,      Pain Inpatient: Hydromorphone 0.4 mg IV every 4 hours as needed, lidocaine patches, Tylenol 650 mg, gabapentin 100mg nightly.      Plan  -  Unclear etiology of abdominal pain, agree with GI consult   -  Can consider  scheduling Tylenol.  Otherwise would not recommend other medication changes.  Do not recommend escalation of opioid therapy at this time.   - If the patient continues to have generalized abdominal pain, can consider a diagnostic differential epidural.  If patient is to remain inpatient until Friday can hold PM heparin Thursday, for plans to do differential epidural Friday.  If patient is discharged we can follow-up outpatient    Francesco Welsh MD

## 2023-10-11 ENCOUNTER — APPOINTMENT (OUTPATIENT)
Dept: RADIOLOGY | Facility: HOSPITAL | Age: 68
End: 2023-10-11
Payer: COMMERCIAL

## 2023-10-11 LAB
GLUCOSE BLD MANUAL STRIP-MCNC: 160 MG/DL (ref 74–99)
GLUCOSE BLD MANUAL STRIP-MCNC: 184 MG/DL (ref 74–99)
GLUCOSE BLD MANUAL STRIP-MCNC: 69 MG/DL (ref 74–99)

## 2023-10-11 PROCEDURE — 2500000004 HC RX 250 GENERAL PHARMACY W/ HCPCS (ALT 636 FOR OP/ED): Performed by: NURSE PRACTITIONER

## 2023-10-11 PROCEDURE — 2500000004 HC RX 250 GENERAL PHARMACY W/ HCPCS (ALT 636 FOR OP/ED): Performed by: FAMILY MEDICINE

## 2023-10-11 PROCEDURE — 72148 MRI LUMBAR SPINE W/O DYE: CPT

## 2023-10-11 PROCEDURE — 2500000002 HC RX 250 W HCPCS SELF ADMINISTERED DRUGS (ALT 637 FOR MEDICARE OP, ALT 636 FOR OP/ED): Performed by: NURSE PRACTITIONER

## 2023-10-11 PROCEDURE — 2500000004 HC RX 250 GENERAL PHARMACY W/ HCPCS (ALT 636 FOR OP/ED): Performed by: INTERNAL MEDICINE

## 2023-10-11 PROCEDURE — 72146 MRI CHEST SPINE W/O DYE: CPT

## 2023-10-11 PROCEDURE — 2500000001 HC RX 250 WO HCPCS SELF ADMINISTERED DRUGS (ALT 637 FOR MEDICARE OP): Performed by: FAMILY MEDICINE

## 2023-10-11 PROCEDURE — 96372 THER/PROPH/DIAG INJ SC/IM: CPT | Performed by: NURSE PRACTITIONER

## 2023-10-11 PROCEDURE — 82947 ASSAY GLUCOSE BLOOD QUANT: CPT

## 2023-10-11 PROCEDURE — 2500000005 HC RX 250 GENERAL PHARMACY W/O HCPCS: Performed by: NURSE PRACTITIONER

## 2023-10-11 PROCEDURE — 1100000001 HC PRIVATE ROOM DAILY

## 2023-10-11 PROCEDURE — 2500000005 HC RX 250 GENERAL PHARMACY W/O HCPCS: Performed by: FAMILY MEDICINE

## 2023-10-11 PROCEDURE — S0119 ONDANSETRON 4 MG: HCPCS | Performed by: FAMILY MEDICINE

## 2023-10-11 PROCEDURE — 2500000001 HC RX 250 WO HCPCS SELF ADMINISTERED DRUGS (ALT 637 FOR MEDICARE OP): Performed by: NURSE PRACTITIONER

## 2023-10-11 RX ORDER — HYDRALAZINE HYDROCHLORIDE 20 MG/ML
10 INJECTION INTRAMUSCULAR; INTRAVENOUS EVERY 4 HOURS PRN
Status: DISCONTINUED | OUTPATIENT
Start: 2023-10-11 | End: 2023-10-13 | Stop reason: HOSPADM

## 2023-10-11 RX ORDER — TRAMADOL HYDROCHLORIDE 50 MG/1
50 TABLET ORAL EVERY 12 HOURS PRN
Status: DISCONTINUED | OUTPATIENT
Start: 2023-10-11 | End: 2023-10-13 | Stop reason: HOSPADM

## 2023-10-11 RX ADMIN — HYDROMORPHONE HYDROCHLORIDE 0.4 MG: 1 INJECTION, SOLUTION INTRAMUSCULAR; INTRAVENOUS; SUBCUTANEOUS at 06:31

## 2023-10-11 RX ADMIN — AMLODIPINE BESYLATE 10 MG: 10 TABLET ORAL at 08:34

## 2023-10-11 RX ADMIN — GABAPENTIN 100 MG: 100 CAPSULE ORAL at 20:46

## 2023-10-11 RX ADMIN — TRAMADOL HYDROCHLORIDE 50 MG: 50 TABLET, COATED ORAL at 11:46

## 2023-10-11 RX ADMIN — HYDROMORPHONE HYDROCHLORIDE 0.4 MG: 1 INJECTION, SOLUTION INTRAMUSCULAR; INTRAVENOUS; SUBCUTANEOUS at 21:13

## 2023-10-11 RX ADMIN — HYDROMORPHONE HYDROCHLORIDE 0.4 MG: 1 INJECTION, SOLUTION INTRAMUSCULAR; INTRAVENOUS; SUBCUTANEOUS at 00:31

## 2023-10-11 RX ADMIN — INSULIN GLARGINE 10 UNITS: 100 INJECTION, SOLUTION SUBCUTANEOUS at 08:35

## 2023-10-11 RX ADMIN — TACROLIMUS 3 MG: 1 CAPSULE ORAL at 17:38

## 2023-10-11 RX ADMIN — HYDRALAZINE HYDROCHLORIDE 10 MG: 20 INJECTION INTRAMUSCULAR; INTRAVENOUS at 02:03

## 2023-10-11 RX ADMIN — Medication 50 MCG: at 08:34

## 2023-10-11 RX ADMIN — LIDOCAINE 1 PATCH: 4 PATCH TOPICAL at 08:34

## 2023-10-11 RX ADMIN — CARVEDILOL 25 MG: 25 TABLET, FILM COATED ORAL at 20:46

## 2023-10-11 RX ADMIN — HYDROMORPHONE HYDROCHLORIDE 0.4 MG: 1 INJECTION, SOLUTION INTRAMUSCULAR; INTRAVENOUS; SUBCUTANEOUS at 17:38

## 2023-10-11 RX ADMIN — PREDNISONE 5 MG: 5 TABLET ORAL at 08:34

## 2023-10-11 RX ADMIN — BENZONATATE 100 MG: 100 CAPSULE ORAL at 20:46

## 2023-10-11 RX ADMIN — ONDANSETRON 4 MG: 4 TABLET, ORALLY DISINTEGRATING ORAL at 05:01

## 2023-10-11 RX ADMIN — INSULIN LISPRO 1 UNITS: 100 INJECTION, SOLUTION INTRAVENOUS; SUBCUTANEOUS at 17:38

## 2023-10-11 RX ADMIN — BENZONATATE 100 MG: 100 CAPSULE ORAL at 08:34

## 2023-10-11 RX ADMIN — INSULIN LISPRO 1 UNITS: 100 INJECTION, SOLUTION INTRAVENOUS; SUBCUTANEOUS at 08:35

## 2023-10-11 RX ADMIN — HYDRALAZINE HYDROCHLORIDE 25 MG: 25 TABLET ORAL at 08:34

## 2023-10-11 RX ADMIN — BENZONATATE 100 MG: 100 CAPSULE ORAL at 14:51

## 2023-10-11 RX ADMIN — PRAVASTATIN SODIUM 40 MG: 40 TABLET ORAL at 20:46

## 2023-10-11 RX ADMIN — CARVEDILOL 25 MG: 25 TABLET, FILM COATED ORAL at 08:35

## 2023-10-11 RX ADMIN — TACROLIMUS 3 MG: 1 CAPSULE ORAL at 06:30

## 2023-10-11 RX ADMIN — HYDRALAZINE HYDROCHLORIDE 25 MG: 25 TABLET ORAL at 14:51

## 2023-10-11 RX ADMIN — HYDRALAZINE HYDROCHLORIDE 25 MG: 25 TABLET ORAL at 20:46

## 2023-10-11 RX ADMIN — AZATHIOPRINE 50 MG: 50 TABLET ORAL at 08:35

## 2023-10-11 RX ADMIN — PANTOPRAZOLE SODIUM 40 MG: 40 TABLET, DELAYED RELEASE ORAL at 06:31

## 2023-10-11 ASSESSMENT — PAIN SCALES - GENERAL
PAINLEVEL_OUTOF10: 10 - WORST POSSIBLE PAIN
PAINLEVEL_OUTOF10: 3
PAINLEVEL_OUTOF10: 6
PAINLEVEL_OUTOF10: 10 - WORST POSSIBLE PAIN

## 2023-10-11 ASSESSMENT — COGNITIVE AND FUNCTIONAL STATUS - GENERAL
DAILY ACTIVITIY SCORE: 24
MOBILITY SCORE: 24

## 2023-10-11 ASSESSMENT — PAIN - FUNCTIONAL ASSESSMENT
PAIN_FUNCTIONAL_ASSESSMENT: 0-10
PAIN_FUNCTIONAL_ASSESSMENT: 0-10

## 2023-10-11 NOTE — PROGRESS NOTES
"INPATIENT PROGRESS NOTES    PRIMARY SERVICE: Lul Aldana MD         10/11/2023  8 21a    INTERVAL HPI: Pt feels pain unchanged    Does not offer much history  Poor eye contact    Flat affect    PERTINENT ROS:  REVIEW OF SYSTEMS  GENERAL: negative for fever, SEE HPI  RESPIRATORY: Negative for cough, wheezing or shortness of breath.  CARDIOVASCULAR: Negative for chest pain, leg swelling or palpitations.  GI: Negative for abdominal discomfort, blood in stools or black stools or change in bowel habits  NEURO: no change per nursing  All other reviewed and negative other than HPI.      MEDICATIONS:    No current facility-administered medications on file prior to encounter.     Current Outpatient Medications on File Prior to Encounter   Medication Sig Dispense Refill    acetaminophen (Tylenol) 325 mg tablet TAKE 2 TABLETS BY MOUTH EVERY 6 HOURS AS NEEDED FOR MILD TO MODERATE PAIN 112 tablet 0    amLODIPine (Norvasc) 10 mg tablet       azaTHIOprine (Imuran) 50 mg tablet Take 1 tablet (50 mg) by mouth once daily.      Basaglar KwikPen U-100 Insulin 100 unit/mL (3 mL) pen INJECT 15 UNITS SUBCUTANEOUSLY EVERY DAY IN THE MORNING AS DIRECTED 3 mL 10    carvedilol (Coreg) 25 mg tablet Take 1 tablet (25 mg) by mouth 2 times a day.      docusate sodium (Colace) 100 mg capsule TAKE 1 CAPSULE BY MOUTH TWO TIMES A DAY TO PREVENT CONSTIPATION 14 capsule 0    FeroSuL 325 mg (65 mg iron) tablet Take 1 tablet (65 mg of iron) by mouth 2 times a day.      fluocinonide (Lidex) 0.05 % ointment 1 APPLICATION DAILY TOPICALLY USE ON SCALP NIGHTLY (Patient not taking: Reported on 10/3/2023) 90 g 1    hydrALAZINE (Apresoline) 25 mg tablet Take 1 tablet (25 mg) by mouth 3 times a day. 1 Tablet by mouth 3 times daily \"Only if blood pressure is above 150/90\"      insulin lispro (HumaLOG) 100 unit/mL injection INJECT UP TO 5 UNITS SUBCUTANEOUSLY THREE TIMES A DAY WITH MEALS PER SLIDING SCALE AS DIRECTED      ketoconazole (NIZOral) 2 % shampoo 1 " APPLICATION DAILY TOPICALLY FOR 1 MONTH. THEN LATHER ON SCALP EVERY FEW DAYS AS NEEDED-LET SIT FOR 4 MINUTES BEFORE WASHING OFF. (Patient not taking: Reported on 10/3/2023) 120 mL 3    losartan (Cozaar) 25 mg tablet Take 1 tablet (25 mg) by mouth once daily.      pantoprazole (ProtoNix) 40 mg EC tablet Take 1 tablet (40 mg) by mouth once daily in the morning. Take before meals. Do not crush, chew, or split.      permethrin (Elimite) 5 % cream APPLY 1 APPLICATION TOPICALLY DAILY USE ON YOUR SCALP ONCE, LEAVE ON OVERNIGHT, REPEAT ONE WEEK LATER, WASH OFF IN THE MORNING (Patient not taking: Reported on 10/3/2023) 60 g 0    pravastatin (Pravachol) 40 mg tablet Take 1 tablet (40 mg) by mouth once daily at bedtime.      predniSONE (Deltasone) 5 mg tablet Take 1 tablet (5 mg) by mouth once daily in the morning.      tacrolimus (Prograf) 1 mg capsule Take 3 capsule AM 3 Capsule PM      tacrolimus (Protopic) 0.1 % ointment APPLY TO AFFECTED AREA(S) TOPICALLY ONCE DAILY TO AFFECTED ITCHY/ IRRITATED AREAS (Patient not taking: Reported on 10/3/2023) 60 g 1    Vitamin D3 25 mcg (1,000 unit) capsule Take 2 capsules (50 mcg) by mouth once daily.           PHYSICAL EXAM:   Vitals:    10/10/23 1931 10/11/23 0037 10/11/23 0300 10/11/23 0757   BP: (!) 185/78 (!) 187/86 164/76 (!) 182/78   BP Location: Right arm Right arm Right arm    Patient Position: Lying Lying Lying Lying   Pulse: 72 69 70 73   Resp: 16 18 18 18   Temp: 37.1 °C (98.8 °F) 37 °C (98.6 °F) 37 °C (98.6 °F) 36.8 °C (98.2 °F)   TempSrc: Oral Oral Temporal Skin   SpO2: 95% 95%  95%   Weight:       Height:            PHYSICAL EXAMINATION:    General appearance: 3  Skin: skin color, texture, turgor normal,   HEENT: Anicteric sclera.  Oropharynx mucosa moist  Neck: Supple, no adenopathy;   Back: no pain to palpation over spine or costovertebral angles,   Lungs: clear to auscultation, no wheezing or rhonchi  Heart: RRR without murmur, gallop, or rubs.   Abdomen: Abdomen soft,  non-tender. Bowel sounds normal. No masses, organomegaly  Extremities: Extremities normal. No  edema, or skin discoloration.   Musculoskeletal: Spine range of motion normal. Muscular strength intact  Neuro: Oriented X  3    DATA: CBC, Coags, BMP, Mg, Phos   Results for orders placed or performed during the hospital encounter of 10/02/23 (from the past 96 hour(s))   POCT GLUCOSE   Result Value Ref Range    POCT Glucose 142 (H) 74 - 99 mg/dL   POCT GLUCOSE   Result Value Ref Range    POCT Glucose 135 (H) 74 - 99 mg/dL   POCT GLUCOSE   Result Value Ref Range    POCT Glucose 206 (H) 74 - 99 mg/dL   CBC   Result Value Ref Range    WBC 2.3 (L) 4.4 - 11.3 x10*3/uL    nRBC 0.0 0.0 - 0.0 /100 WBCs    RBC 2.90 (L) 4.50 - 5.90 x10*6/uL    Hemoglobin 8.0 (L) 13.5 - 17.5 g/dL    Hematocrit 23.6 (L) 41.0 - 52.0 %    MCV 81 80 - 100 fL    MCH 27.6 26.0 - 34.0 pg    MCHC 33.9 32.0 - 36.0 g/dL    RDW 15.4 (H) 11.5 - 14.5 %    Platelets 90 (L) 150 - 450 x10*3/uL    MPV 9.1 7.5 - 11.5 fL   Basic Metabolic Panel   Result Value Ref Range    Glucose 168 (H) 74 - 99 mg/dL    Sodium 134 (L) 136 - 145 mmol/L    Potassium 4.4 3.5 - 5.3 mmol/L    Chloride 105 98 - 107 mmol/L    Bicarbonate 25 21 - 32 mmol/L    Anion Gap 8 (L) 10 - 20 mmol/L    Urea Nitrogen 17 6 - 23 mg/dL    Creatinine 2.56 (H) 0.50 - 1.30 mg/dL    eGFR 27 (L) >60 mL/min/1.73m*2    Calcium 9.8 8.6 - 10.3 mg/dL   POCT GLUCOSE   Result Value Ref Range    POCT Glucose 142 (H) 74 - 99 mg/dL   POCT GLUCOSE   Result Value Ref Range    POCT Glucose 203 (H) 74 - 99 mg/dL   POCT GLUCOSE   Result Value Ref Range    POCT Glucose 152 (H) 74 - 99 mg/dL   POCT GLUCOSE   Result Value Ref Range    POCT Glucose 152 (H) 74 - 99 mg/dL   CBC   Result Value Ref Range    WBC 2.7 (L) 4.4 - 11.3 x10*3/uL    nRBC 0.0 0.0 - 0.0 /100 WBCs    RBC 2.86 (L) 4.50 - 5.90 x10*6/uL    Hemoglobin 7.9 (L) 13.5 - 17.5 g/dL    Hematocrit 23.1 (L) 41.0 - 52.0 %    MCV 81 80 - 100 fL    MCH 27.6 26.0 - 34.0 pg     MCHC 34.2 32.0 - 36.0 g/dL    RDW 15.6 (H) 11.5 - 14.5 %    Platelets 98 (L) 150 - 450 x10*3/uL    MPV 9.3 7.5 - 11.5 fL   Basic Metabolic Panel   Result Value Ref Range    Glucose 142 (H) 74 - 99 mg/dL    Sodium 134 (L) 136 - 145 mmol/L    Potassium 4.2 3.5 - 5.3 mmol/L    Chloride 106 98 - 107 mmol/L    Bicarbonate 24 21 - 32 mmol/L    Anion Gap 8 (L) 10 - 20 mmol/L    Urea Nitrogen 19 6 - 23 mg/dL    Creatinine 2.51 (H) 0.50 - 1.30 mg/dL    eGFR 27 (L) >60 mL/min/1.73m*2    Calcium 9.6 8.6 - 10.3 mg/dL   POCT GLUCOSE   Result Value Ref Range    POCT Glucose 152 (H) 74 - 99 mg/dL   POCT GLUCOSE   Result Value Ref Range    POCT Glucose 136 (H) 74 - 99 mg/dL   POCT GLUCOSE   Result Value Ref Range    POCT Glucose 166 (H) 74 - 99 mg/dL   POCT GLUCOSE   Result Value Ref Range    POCT Glucose 223 (H) 74 - 99 mg/dL   CBC   Result Value Ref Range    WBC 2.5 (L) 4.4 - 11.3 x10*3/uL    nRBC 0.0 0.0 - 0.0 /100 WBCs    RBC 2.81 (L) 4.50 - 5.90 x10*6/uL    Hemoglobin 7.8 (L) 13.5 - 17.5 g/dL    Hematocrit 23.0 (L) 41.0 - 52.0 %    MCV 82 80 - 100 fL    MCH 27.8 26.0 - 34.0 pg    MCHC 33.9 32.0 - 36.0 g/dL    RDW 15.8 (H) 11.5 - 14.5 %    Platelets 96 (L) 150 - 450 x10*3/uL    MPV 9.2 7.5 - 11.5 fL   Basic Metabolic Panel   Result Value Ref Range    Glucose 171 (H) 74 - 99 mg/dL    Sodium 135 (L) 136 - 145 mmol/L    Potassium 4.2 3.5 - 5.3 mmol/L    Chloride 108 (H) 98 - 107 mmol/L    Bicarbonate 24 21 - 32 mmol/L    Anion Gap 7 (L) 10 - 20 mmol/L    Urea Nitrogen 22 6 - 23 mg/dL    Creatinine 2.53 (H) 0.50 - 1.30 mg/dL    eGFR 27 (L) >60 mL/min/1.73m*2    Calcium 10.1 8.6 - 10.3 mg/dL   POCT GLUCOSE   Result Value Ref Range    POCT Glucose 154 (H) 74 - 99 mg/dL   POCT GLUCOSE   Result Value Ref Range    POCT Glucose 145 (H) 74 - 99 mg/dL   POCT GLUCOSE   Result Value Ref Range    POCT Glucose 122 (H) 74 - 99 mg/dL   POCT GLUCOSE   Result Value Ref Range    POCT Glucose 173 (H) 74 - 99 mg/dL   POCT GLUCOSE   Result Value Ref  Range    POCT Glucose 160 (H) 74 - 99 mg/dL           ASSESSMENT AND PLAN:     Principal Problem:    Generalized abdominal pain    #Abdominal pain persists  Needs regular IV pain medications  Input from GI and pain management noted  No clear etiology of pain  Add tramadol  Check MRI spine, see orders. This was ordered yesterday and still waiting to be done   Cont regimen as per pain mngt  +/- epidural later this week?     #Suprapubic discomfort  Bladder ultrasound showed 60 cc postvoid residual  UA is negative for infection     #Acute on chronic kidney injury  History of renal transplant  Cont with monitoring, renal function at baseline     #Hypertension  Stable     #Pancytopenia  Monitor numbers  No active signs of bleeding  Might need hematological work-up  However can be done as outpt      Plan of care discussed with: Provider, RN, Patient.      Patient case and plan of care discussed with Dr. KRISTA Aldana.    ANÍBAL Chapman - CNP  -In collaboration with Dr. KRISTA Aldana    [facility]  Office: [phone]  Fax: [phone]  Pt seen , wife by bedside  Per pt he does not have abdo pain  Does have back pain  Will get MRI back  Will consider dc to home if back mri is ok  Lul Aldana MD

## 2023-10-11 NOTE — PROGRESS NOTES
Patient not medically ready for discharge.  Patient abdominal pain documented as 10/10 this morning.  Pain Management consulted. Per MD note plan for diagnostic Epidural procedure on Friday 10/13/2023. Skilled need to be determined.   Umm GHOSH RN [facility] CCM

## 2023-10-11 NOTE — CARE PLAN
Problem: Pain - Adult  Goal: Verbalizes/displays adequate comfort level or baseline comfort level  Outcome: Progressing

## 2023-10-12 LAB
GLUCOSE BLD MANUAL STRIP-MCNC: 106 MG/DL (ref 74–99)
GLUCOSE BLD MANUAL STRIP-MCNC: 147 MG/DL (ref 74–99)
GLUCOSE BLD MANUAL STRIP-MCNC: 207 MG/DL (ref 74–99)

## 2023-10-12 PROCEDURE — 2500000001 HC RX 250 WO HCPCS SELF ADMINISTERED DRUGS (ALT 637 FOR MEDICARE OP): Performed by: NURSE PRACTITIONER

## 2023-10-12 PROCEDURE — 96372 THER/PROPH/DIAG INJ SC/IM: CPT | Performed by: NURSE PRACTITIONER

## 2023-10-12 PROCEDURE — 2500000001 HC RX 250 WO HCPCS SELF ADMINISTERED DRUGS (ALT 637 FOR MEDICARE OP): Performed by: FAMILY MEDICINE

## 2023-10-12 PROCEDURE — 2500000004 HC RX 250 GENERAL PHARMACY W/ HCPCS (ALT 636 FOR OP/ED): Performed by: FAMILY MEDICINE

## 2023-10-12 PROCEDURE — 2500000005 HC RX 250 GENERAL PHARMACY W/O HCPCS: Performed by: FAMILY MEDICINE

## 2023-10-12 PROCEDURE — 2500000004 HC RX 250 GENERAL PHARMACY W/ HCPCS (ALT 636 FOR OP/ED): Performed by: INTERNAL MEDICINE

## 2023-10-12 PROCEDURE — S0119 ONDANSETRON 4 MG: HCPCS | Performed by: FAMILY MEDICINE

## 2023-10-12 PROCEDURE — 2500000002 HC RX 250 W HCPCS SELF ADMINISTERED DRUGS (ALT 637 FOR MEDICARE OP, ALT 636 FOR OP/ED): Performed by: NURSE PRACTITIONER

## 2023-10-12 PROCEDURE — 2500000005 HC RX 250 GENERAL PHARMACY W/O HCPCS: Performed by: NURSE PRACTITIONER

## 2023-10-12 PROCEDURE — 1100000001 HC PRIVATE ROOM DAILY

## 2023-10-12 PROCEDURE — 82947 ASSAY GLUCOSE BLOOD QUANT: CPT

## 2023-10-12 PROCEDURE — 2500000004 HC RX 250 GENERAL PHARMACY W/ HCPCS (ALT 636 FOR OP/ED): Performed by: NURSE PRACTITIONER

## 2023-10-12 RX ORDER — LOSARTAN POTASSIUM 25 MG/1
25 TABLET ORAL DAILY
Status: DISCONTINUED | OUTPATIENT
Start: 2023-10-13 | End: 2023-10-13 | Stop reason: HOSPADM

## 2023-10-12 RX ADMIN — PRAVASTATIN SODIUM 40 MG: 40 TABLET ORAL at 21:32

## 2023-10-12 RX ADMIN — PREDNISONE 5 MG: 5 TABLET ORAL at 08:36

## 2023-10-12 RX ADMIN — HYDRALAZINE HYDROCHLORIDE 25 MG: 25 TABLET ORAL at 21:31

## 2023-10-12 RX ADMIN — AZATHIOPRINE 50 MG: 50 TABLET ORAL at 08:36

## 2023-10-12 RX ADMIN — BENZONATATE 100 MG: 100 CAPSULE ORAL at 08:36

## 2023-10-12 RX ADMIN — HYDROMORPHONE HYDROCHLORIDE 0.4 MG: 1 INJECTION, SOLUTION INTRAMUSCULAR; INTRAVENOUS; SUBCUTANEOUS at 18:30

## 2023-10-12 RX ADMIN — AMLODIPINE BESYLATE 10 MG: 10 TABLET ORAL at 08:36

## 2023-10-12 RX ADMIN — GABAPENTIN 100 MG: 100 CAPSULE ORAL at 21:32

## 2023-10-12 RX ADMIN — TACROLIMUS 3 MG: 1 CAPSULE ORAL at 18:21

## 2023-10-12 RX ADMIN — HYDRALAZINE HYDROCHLORIDE 25 MG: 25 TABLET ORAL at 08:36

## 2023-10-12 RX ADMIN — CARVEDILOL 25 MG: 25 TABLET, FILM COATED ORAL at 21:32

## 2023-10-12 RX ADMIN — HYDRALAZINE HYDROCHLORIDE 25 MG: 25 TABLET ORAL at 15:24

## 2023-10-12 RX ADMIN — TACROLIMUS 3 MG: 1 CAPSULE ORAL at 05:28

## 2023-10-12 RX ADMIN — Medication 50 MCG: at 08:36

## 2023-10-12 RX ADMIN — LIDOCAINE 1 PATCH: 4 PATCH TOPICAL at 08:36

## 2023-10-12 RX ADMIN — PANTOPRAZOLE SODIUM 40 MG: 40 TABLET, DELAYED RELEASE ORAL at 08:36

## 2023-10-12 RX ADMIN — CARVEDILOL 25 MG: 25 TABLET, FILM COATED ORAL at 08:36

## 2023-10-12 RX ADMIN — TRAMADOL HYDROCHLORIDE 50 MG: 50 TABLET, COATED ORAL at 05:28

## 2023-10-12 RX ADMIN — BENZONATATE 100 MG: 100 CAPSULE ORAL at 15:24

## 2023-10-12 RX ADMIN — INSULIN GLARGINE 10 UNITS: 100 INJECTION, SOLUTION SUBCUTANEOUS at 08:39

## 2023-10-12 RX ADMIN — INSULIN LISPRO 2 UNITS: 100 INJECTION, SOLUTION INTRAVENOUS; SUBCUTANEOUS at 08:36

## 2023-10-12 RX ADMIN — ONDANSETRON 4 MG: 4 TABLET, ORALLY DISINTEGRATING ORAL at 09:11

## 2023-10-12 RX ADMIN — BENZONATATE 100 MG: 100 CAPSULE ORAL at 21:32

## 2023-10-12 ASSESSMENT — COGNITIVE AND FUNCTIONAL STATUS - GENERAL
MOBILITY SCORE: 24
DAILY ACTIVITIY SCORE: 24

## 2023-10-12 ASSESSMENT — PAIN SCALES - GENERAL
PAINLEVEL_OUTOF10: 0 - NO PAIN
PAINLEVEL_OUTOF10: 0 - NO PAIN
PAINLEVEL_OUTOF10: 5 - MODERATE PAIN
PAINLEVEL_OUTOF10: 7

## 2023-10-12 ASSESSMENT — PAIN - FUNCTIONAL ASSESSMENT
PAIN_FUNCTIONAL_ASSESSMENT: 0-10

## 2023-10-12 ASSESSMENT — PAIN DESCRIPTION - DESCRIPTORS: DESCRIPTORS: DISCOMFORT

## 2023-10-12 NOTE — CARE PLAN
The patient's goals for the shift include Be comfortable    The clinical goals for the shift include Pt will have decreased abd pain and nausea

## 2023-10-12 NOTE — CARE PLAN
The patient's goals for the shift include decrease abdominal pain    The clinical goals for the shift include comfort and safety

## 2023-10-12 NOTE — CARE PLAN
The patient's goals for the shift include Be comfortable    The clinical goals for the shift include Pt will have decreased abd pain and nausea    Over the shift, the patient did not make progress toward the following goals.

## 2023-10-13 VITALS
HEIGHT: 68 IN | SYSTOLIC BLOOD PRESSURE: 162 MMHG | TEMPERATURE: 98.1 F | RESPIRATION RATE: 18 BRPM | HEART RATE: 63 BPM | DIASTOLIC BLOOD PRESSURE: 83 MMHG | WEIGHT: 160 LBS | OXYGEN SATURATION: 98 % | BODY MASS INDEX: 24.25 KG/M2

## 2023-10-13 LAB
GLUCOSE BLD MANUAL STRIP-MCNC: 148 MG/DL (ref 74–99)
GLUCOSE BLD MANUAL STRIP-MCNC: 175 MG/DL (ref 74–99)
GLUCOSE BLD MANUAL STRIP-MCNC: 177 MG/DL (ref 74–99)

## 2023-10-13 PROCEDURE — 2500000002 HC RX 250 W HCPCS SELF ADMINISTERED DRUGS (ALT 637 FOR MEDICARE OP, ALT 636 FOR OP/ED): Performed by: NURSE PRACTITIONER

## 2023-10-13 PROCEDURE — 96372 THER/PROPH/DIAG INJ SC/IM: CPT | Performed by: NURSE PRACTITIONER

## 2023-10-13 PROCEDURE — 82947 ASSAY GLUCOSE BLOOD QUANT: CPT

## 2023-10-13 PROCEDURE — 2500000004 HC RX 250 GENERAL PHARMACY W/ HCPCS (ALT 636 FOR OP/ED): Performed by: FAMILY MEDICINE

## 2023-10-13 PROCEDURE — 2500000005 HC RX 250 GENERAL PHARMACY W/O HCPCS: Performed by: NURSE PRACTITIONER

## 2023-10-13 PROCEDURE — 2500000004 HC RX 250 GENERAL PHARMACY W/ HCPCS (ALT 636 FOR OP/ED): Performed by: NURSE PRACTITIONER

## 2023-10-13 PROCEDURE — 2500000004 HC RX 250 GENERAL PHARMACY W/ HCPCS (ALT 636 FOR OP/ED): Performed by: INTERNAL MEDICINE

## 2023-10-13 PROCEDURE — 2500000001 HC RX 250 WO HCPCS SELF ADMINISTERED DRUGS (ALT 637 FOR MEDICARE OP): Performed by: NURSE PRACTITIONER

## 2023-10-13 PROCEDURE — 2500000001 HC RX 250 WO HCPCS SELF ADMINISTERED DRUGS (ALT 637 FOR MEDICARE OP): Performed by: FAMILY MEDICINE

## 2023-10-13 RX ORDER — INSULIN GLARGINE 100 [IU]/ML
10 INJECTION, SOLUTION SUBCUTANEOUS DAILY
Start: 2023-10-13 | End: 2023-10-25 | Stop reason: SDUPTHER

## 2023-10-13 RX ADMIN — LOSARTAN POTASSIUM 25 MG: 25 TABLET, FILM COATED ORAL at 08:53

## 2023-10-13 RX ADMIN — PANTOPRAZOLE SODIUM 40 MG: 40 TABLET, DELAYED RELEASE ORAL at 07:00

## 2023-10-13 RX ADMIN — HYDROMORPHONE HYDROCHLORIDE 0.4 MG: 1 INJECTION, SOLUTION INTRAMUSCULAR; INTRAVENOUS; SUBCUTANEOUS at 15:49

## 2023-10-13 RX ADMIN — AMLODIPINE BESYLATE 10 MG: 10 TABLET ORAL at 08:53

## 2023-10-13 RX ADMIN — HYDROMORPHONE HYDROCHLORIDE 0.4 MG: 1 INJECTION, SOLUTION INTRAMUSCULAR; INTRAVENOUS; SUBCUTANEOUS at 05:43

## 2023-10-13 RX ADMIN — TRAMADOL HYDROCHLORIDE 50 MG: 50 TABLET, COATED ORAL at 10:52

## 2023-10-13 RX ADMIN — PREDNISONE 5 MG: 5 TABLET ORAL at 08:53

## 2023-10-13 RX ADMIN — Medication 50 MCG: at 08:53

## 2023-10-13 RX ADMIN — BENZONATATE 100 MG: 100 CAPSULE ORAL at 08:53

## 2023-10-13 RX ADMIN — LIDOCAINE 1 PATCH: 4 PATCH TOPICAL at 08:53

## 2023-10-13 RX ADMIN — HYDRALAZINE HYDROCHLORIDE 25 MG: 25 TABLET ORAL at 15:24

## 2023-10-13 RX ADMIN — INSULIN GLARGINE 10 UNITS: 100 INJECTION, SOLUTION SUBCUTANEOUS at 08:54

## 2023-10-13 RX ADMIN — TACROLIMUS 3 MG: 1 CAPSULE ORAL at 05:43

## 2023-10-13 RX ADMIN — INSULIN LISPRO 1 UNITS: 100 INJECTION, SOLUTION INTRAVENOUS; SUBCUTANEOUS at 11:52

## 2023-10-13 RX ADMIN — HYDRALAZINE HYDROCHLORIDE 25 MG: 25 TABLET ORAL at 08:53

## 2023-10-13 RX ADMIN — CARVEDILOL 25 MG: 25 TABLET, FILM COATED ORAL at 08:53

## 2023-10-13 RX ADMIN — BENZONATATE 100 MG: 100 CAPSULE ORAL at 15:24

## 2023-10-13 RX ADMIN — AZATHIOPRINE 50 MG: 50 TABLET ORAL at 08:53

## 2023-10-13 ASSESSMENT — PAIN SCALES - GENERAL
PAINLEVEL_OUTOF10: 9
PAINLEVEL_OUTOF10: 7
PAINLEVEL_OUTOF10: 6
PAINLEVEL_OUTOF10: 0 - NO PAIN

## 2023-10-13 ASSESSMENT — COGNITIVE AND FUNCTIONAL STATUS - GENERAL
DAILY ACTIVITIY SCORE: 24
MOBILITY SCORE: 24

## 2023-10-13 ASSESSMENT — PAIN SCALES - WONG BAKER
WONGBAKER_NUMERICALRESPONSE: NO HURT
WONGBAKER_NUMERICALRESPONSE: HURTS EVEN MORE

## 2023-10-13 NOTE — NURSING NOTE
Discharge instructions provided using teach back method. Pt's health related  risk factors discussed with pt. pt educated to look for any worsening sign and symptoms. Pt educated to seek medical attention if experience any medical emergency. Pt aware to follow up with outpatient clinics as scheduled. Home going meds reviewed with pt. Pt verbalized understanding of disposition and discharge instructions. All questions answered to patient's satisfaction and within nursing scope of practice. Vitals stable, IV removed.   Chapo Story Discharge R.N.

## 2023-10-13 NOTE — PROGRESS NOTES
"  INPATIENT PROGRESS NOTES    PRIMARY SERVICE: Lul Aldana MD     10/13/2023  9 42a    INTERVAL HPI: Pt is feeling pain is better today    He is sitting up in bed eating breakfast   Nad  No complaints       MEDICATIONS:    No current facility-administered medications on file prior to encounter.     Current Outpatient Medications on File Prior to Encounter   Medication Sig Dispense Refill    acetaminophen (Tylenol) 325 mg tablet TAKE 2 TABLETS BY MOUTH EVERY 6 HOURS AS NEEDED FOR MILD TO MODERATE PAIN 112 tablet 0    amLODIPine (Norvasc) 10 mg tablet       azaTHIOprine (Imuran) 50 mg tablet Take 1 tablet (50 mg) by mouth once daily.      Basaglar KwikPen U-100 Insulin 100 unit/mL (3 mL) pen INJECT 15 UNITS SUBCUTANEOUSLY EVERY DAY IN THE MORNING AS DIRECTED 3 mL 10    carvedilol (Coreg) 25 mg tablet Take 1 tablet (25 mg) by mouth 2 times a day.      docusate sodium (Colace) 100 mg capsule TAKE 1 CAPSULE BY MOUTH TWO TIMES A DAY TO PREVENT CONSTIPATION 14 capsule 0    FeroSuL 325 mg (65 mg iron) tablet Take 1 tablet (65 mg of iron) by mouth 2 times a day.      fluocinonide (Lidex) 0.05 % ointment 1 APPLICATION DAILY TOPICALLY USE ON SCALP NIGHTLY (Patient not taking: Reported on 10/3/2023) 90 g 1    hydrALAZINE (Apresoline) 25 mg tablet Take 1 tablet (25 mg) by mouth 3 times a day. 1 Tablet by mouth 3 times daily \"Only if blood pressure is above 150/90\"      insulin lispro (HumaLOG) 100 unit/mL injection INJECT UP TO 5 UNITS SUBCUTANEOUSLY THREE TIMES A DAY WITH MEALS PER SLIDING SCALE AS DIRECTED      ketoconazole (NIZOral) 2 % shampoo 1 APPLICATION DAILY TOPICALLY FOR 1 MONTH. THEN LATHER ON SCALP EVERY FEW DAYS AS NEEDED-LET SIT FOR 4 MINUTES BEFORE WASHING OFF. (Patient not taking: Reported on 10/3/2023) 120 mL 3    losartan (Cozaar) 25 mg tablet Take 1 tablet (25 mg) by mouth once daily.      pantoprazole (ProtoNix) 40 mg EC tablet Take 1 tablet (40 mg) by mouth once daily in the morning. Take before meals. Do " not crush, chew, or split.      permethrin (Elimite) 5 % cream APPLY 1 APPLICATION TOPICALLY DAILY USE ON YOUR SCALP ONCE, LEAVE ON OVERNIGHT, REPEAT ONE WEEK LATER, WASH OFF IN THE MORNING (Patient not taking: Reported on 10/3/2023) 60 g 0    pravastatin (Pravachol) 40 mg tablet Take 1 tablet (40 mg) by mouth once daily at bedtime.      predniSONE (Deltasone) 5 mg tablet Take 1 tablet (5 mg) by mouth once daily in the morning.      tacrolimus (Prograf) 1 mg capsule Take 3 capsule AM 3 Capsule PM      tacrolimus (Protopic) 0.1 % ointment APPLY TO AFFECTED AREA(S) TOPICALLY ONCE DAILY TO AFFECTED ITCHY/ IRRITATED AREAS (Patient not taking: Reported on 10/3/2023) 60 g 1    Vitamin D3 25 mcg (1,000 unit) capsule Take 2 capsules (50 mcg) by mouth once daily.           PHYSICAL EXAM:   Vitals:    10/12/23 1959 10/12/23 2337 10/13/23 0214 10/13/23 0804   BP: 168/81 169/78 172/74 170/73   BP Location: Left arm Right arm Right arm    Patient Position: Lying Lying Lying Lying   Pulse: 74 66 67 69   Resp: 17 17 18 18   Temp: 36.9 °C (98.4 °F) 36.9 °C (98.5 °F) 37.1 °C (98.8 °F) 36.3 °C (97.4 °F)   TempSrc: Tympanic Tympanic Oral Skin   SpO2: 93%  95% 97%   Weight:       Height:            PHYSICAL EXAMINATION:    General appearance: alert oriented x 3  Skin: skin color, texture, turgor normal,   HEENT: Anicteric sclera.  Oropharynx mucosa moist  Neck: Supple, no adenopathy;   Back: no pain to palpation over spine or costovertebral angles,   Lungs: clear to auscultation, no wheezing or rhonchi  Heart: RRR without murmur, gallop, or rubs.   Abdomen: Abdomen soft, non-tender. Bowel sounds normal. No masses, organomegaly  Extremities: Extremities normal. No  edema, or skin discoloration.   Musculoskeletal: Spine range of motion normal. Muscular strength intact  Neuro: Oriented X  3    DATA: CBC, Coags, BMP, Mg, Phos   Results for orders placed or performed during the hospital encounter of 10/02/23 (from the past 96 hour(s))    POCT GLUCOSE   Result Value Ref Range    POCT Glucose 136 (H) 74 - 99 mg/dL   POCT GLUCOSE   Result Value Ref Range    POCT Glucose 166 (H) 74 - 99 mg/dL   POCT GLUCOSE   Result Value Ref Range    POCT Glucose 223 (H) 74 - 99 mg/dL   CBC   Result Value Ref Range    WBC 2.5 (L) 4.4 - 11.3 x10*3/uL    nRBC 0.0 0.0 - 0.0 /100 WBCs    RBC 2.81 (L) 4.50 - 5.90 x10*6/uL    Hemoglobin 7.8 (L) 13.5 - 17.5 g/dL    Hematocrit 23.0 (L) 41.0 - 52.0 %    MCV 82 80 - 100 fL    MCH 27.8 26.0 - 34.0 pg    MCHC 33.9 32.0 - 36.0 g/dL    RDW 15.8 (H) 11.5 - 14.5 %    Platelets 96 (L) 150 - 450 x10*3/uL    MPV 9.2 7.5 - 11.5 fL   Basic Metabolic Panel   Result Value Ref Range    Glucose 171 (H) 74 - 99 mg/dL    Sodium 135 (L) 136 - 145 mmol/L    Potassium 4.2 3.5 - 5.3 mmol/L    Chloride 108 (H) 98 - 107 mmol/L    Bicarbonate 24 21 - 32 mmol/L    Anion Gap 7 (L) 10 - 20 mmol/L    Urea Nitrogen 22 6 - 23 mg/dL    Creatinine 2.53 (H) 0.50 - 1.30 mg/dL    eGFR 27 (L) >60 mL/min/1.73m*2    Calcium 10.1 8.6 - 10.3 mg/dL   POCT GLUCOSE   Result Value Ref Range    POCT Glucose 154 (H) 74 - 99 mg/dL   POCT GLUCOSE   Result Value Ref Range    POCT Glucose 145 (H) 74 - 99 mg/dL   POCT GLUCOSE   Result Value Ref Range    POCT Glucose 122 (H) 74 - 99 mg/dL   POCT GLUCOSE   Result Value Ref Range    POCT Glucose 173 (H) 74 - 99 mg/dL   POCT GLUCOSE   Result Value Ref Range    POCT Glucose 160 (H) 74 - 99 mg/dL   POCT GLUCOSE   Result Value Ref Range    POCT Glucose 69 (L) 74 - 99 mg/dL   POCT GLUCOSE   Result Value Ref Range    POCT Glucose 184 (H) 74 - 99 mg/dL   POCT GLUCOSE   Result Value Ref Range    POCT Glucose 207 (H) 74 - 99 mg/dL   POCT GLUCOSE   Result Value Ref Range    POCT Glucose 106 (H) 74 - 99 mg/dL   POCT GLUCOSE   Result Value Ref Range    POCT Glucose 147 (H) 74 - 99 mg/dL   POCT GLUCOSE   Result Value Ref Range    POCT Glucose 148 (H) 74 - 99 mg/dL     MRI reviewed for thoracic and lumbar spine      ASSESSMENT AND PLAN:      Principal Problem:    Generalized abdominal pain    #Abdominal pain resolved  Back pain wax and lorena   Input from specialities noted  No clear etiology of pain  MRI noted d/w pt   C/w same tx    #Suprapubic discomfort  Bladder ultrasound showed 60 cc postvoid residual  UA is negative for infection     #Acute on chronic kidney injury stable   History of renal transplant  Cont with monitoring, renal function at baseline     #Hypertension  Resume losartan     #Pancytopenia  Monitor numbers  No active signs of bleeding  Might need hematological work-up  However can be done as outpt      -Continue current treatment as ordered. Will make adjustments as necessary.     Anticipate discharge today  Pt wants to see/speak with attending prior     Patient case and plan of care discussed with Dr. KRISTA Aldana.    ANÍBAL Chapman - CNP  -In collaboration with Dr. KRISTA Aldana    [facility]  Office: [phone]  Fax: [phone]   I have reviewed the above note obtained and documented by the NP/PA and I personally participated in the key components. I have discussed the case and management of the patient's care. Changes made to the note, and all key components of history and physical/progress note done by me.  dw nursing  Lul Aldana MD

## 2023-10-13 NOTE — DISCHARGE SUMMARY
Discharge Diagnosis  Generalized abdominal pain    Issues Requiring Follow-Up  See chart     Discharge Meds     Your medication list        START taking these medications        Instructions Last Dose Given Next Dose Due   insulin glargine 100 unit/mL injection  Commonly known as: Lantus  Replaces: Basaglar KwikPen U-100 Insulin 100 unit/mL (3 mL) pen      Inject 10 Units under the skin once daily. Take as directed per insulin instructions.              CHANGE how you take these medications        Instructions Last Dose Given Next Dose Due   tacrolimus 1 mg capsule  Commonly known as: Prograf  What changed: Another medication with the same name was removed. Continue taking this medication, and follow the directions you see here.                  CONTINUE taking these medications        Instructions Last Dose Given Next Dose Due   acetaminophen 325 mg tablet  Commonly known as: Tylenol      TAKE 2 TABLETS BY MOUTH EVERY 6 HOURS AS NEEDED FOR MILD TO MODERATE PAIN       amLODIPine 10 mg tablet  Commonly known as: Norvasc           azaTHIOprine 50 mg tablet  Commonly known as: Imuran           carvedilol 25 mg tablet  Commonly known as: Coreg           docusate sodium 100 mg capsule  Commonly known as: Colace      TAKE 1 CAPSULE BY MOUTH TWO TIMES A DAY TO PREVENT CONSTIPATION       FeroSuL 325 (65 Fe) MG tablet  Generic drug: ferrous sulfate           hydrALAZINE 25 mg tablet  Commonly known as: Apresoline           insulin lispro 100 unit/mL injection  Commonly known as: HumaLOG           losartan 25 mg tablet  Commonly known as: Cozaar           pantoprazole 40 mg EC tablet  Commonly known as: ProtoNix           pravastatin 40 mg tablet  Commonly known as: Pravachol           predniSONE 5 mg tablet  Commonly known as: Deltasone           Vitamin D3 25 MCG (1000 UT) capsule  Generic drug: cholecalciferol                  STOP taking these medications      Basaglar KwikPen U-100 Insulin 100 unit/mL (3 mL) pen  Generic  drug: insulin glargine  Replaced by: insulin glargine 100 unit/mL injection        fluocinonide 0.05 % ointment  Commonly known as: Lidex        ketoconazole 2 % shampoo  Commonly known as: NIZOral        permethrin 5 % cream  Commonly known as: Elimite                  Where to Get Your Medications        Information about where to get these medications is not yet available    Ask your nurse or doctor about these medications  insulin glargine 100 unit/mL injection         Test Results Pending At Discharge  Pending Labs       Order Current Status    Urine culture Collected (10/04/23 9045)            Hospital Course   Principal Problem:    Generalized abdominal pain     #Abdominal pain resolved  Back pain wax and lorena   Input from specialities noted  No clear etiology of pain  MRI noted d/w pt   C/w same tx     #Suprapubic discomfort  Bladder ultrasound showed 60 cc postvoid residual  UA is negative for infection     #Acute on chronic kidney injury stable   History of renal transplant  Cont with monitoring, renal function at baseline     #Hypertension  Resume losartan     #Pancytopenia  Monitor numbers  No active signs of bleeding  Might need hematological work-up  However can be done as outpt      pt stable for discharge  Follow up as out patient     Pertinent Physical Exam At Time of Discharge  Physical Exam    Outpatient Follow-Up  Future Appointments   Date Time Provider Department Center   10/13/2023  1:30 PM Octavio Pickering MD [id number] [facility]   10/16/2023 11:00 AM TXP KIDNEY PROVIDER [id number] [facility]   10/17/2023  9:00 AM Elizabeth Alejandro MD [id number] [facility]   10/18/2023 10:30 AM Vinicius Araiza MD [id number] [facility]   10/25/2023  8:40 AM Estella Quinonez MD [id number] [facility]   11/15/2023  2:15 PM Maddison Faustin APRN-CNP [id number] [facility]   11/27/2023  3:15 PM Kurtis Jc MD [id number] [facility]         ANÍBAL Land-CNP  agree wt discharge summary , reviewd and dw NP  Lul  MD Katya

## 2023-10-13 NOTE — CARE PLAN
Problem: Pain - Adult  Goal: Verbalizes/displays adequate comfort level or baseline comfort level  Outcome: Progressing     Problem: Diabetes  Goal: Increase stability of blood glucose readings by end of shift  Outcome: Progressing   The patient's goals for the shift include Be comfortable    The clinical goals for the shift include Pt will have decreased abd pain and nausea    Over the shift, the patient did not make progress toward the following goals. Barriers to progression include . Recommendations to address these barriers include .

## 2023-10-13 NOTE — PROGRESS NOTES
Pending plan of care for abdominal pain management. No lab results today prior H/H 7.8/23.0, PLT 98  No skilled needs identified at this time.   Umm Tinajero BSN RN [facility] CCM

## 2023-10-13 NOTE — PROGRESS NOTES
"INPATIENT PROGRESS NOTES    PRIMARY SERVICE: Lul Aldana MD     10/12/2023  10:58 PM    INTERVAL HPI: Pt is feeling pain is improving  No back pain   No chest pain  No shortness of breath    MEDICATIONS:    No current facility-administered medications on file prior to encounter.     Current Outpatient Medications on File Prior to Encounter   Medication Sig Dispense Refill    acetaminophen (Tylenol) 325 mg tablet TAKE 2 TABLETS BY MOUTH EVERY 6 HOURS AS NEEDED FOR MILD TO MODERATE PAIN 112 tablet 0    amLODIPine (Norvasc) 10 mg tablet       azaTHIOprine (Imuran) 50 mg tablet Take 1 tablet (50 mg) by mouth once daily.      Basaglar KwikPen U-100 Insulin 100 unit/mL (3 mL) pen INJECT 15 UNITS SUBCUTANEOUSLY EVERY DAY IN THE MORNING AS DIRECTED 3 mL 10    carvedilol (Coreg) 25 mg tablet Take 1 tablet (25 mg) by mouth 2 times a day.      docusate sodium (Colace) 100 mg capsule TAKE 1 CAPSULE BY MOUTH TWO TIMES A DAY TO PREVENT CONSTIPATION 14 capsule 0    FeroSuL 325 mg (65 mg iron) tablet Take 1 tablet (65 mg of iron) by mouth 2 times a day.      fluocinonide (Lidex) 0.05 % ointment 1 APPLICATION DAILY TOPICALLY USE ON SCALP NIGHTLY (Patient not taking: Reported on 10/3/2023) 90 g 1    hydrALAZINE (Apresoline) 25 mg tablet Take 1 tablet (25 mg) by mouth 3 times a day. 1 Tablet by mouth 3 times daily \"Only if blood pressure is above 150/90\"      insulin lispro (HumaLOG) 100 unit/mL injection INJECT UP TO 5 UNITS SUBCUTANEOUSLY THREE TIMES A DAY WITH MEALS PER SLIDING SCALE AS DIRECTED      ketoconazole (NIZOral) 2 % shampoo 1 APPLICATION DAILY TOPICALLY FOR 1 MONTH. THEN LATHER ON SCALP EVERY FEW DAYS AS NEEDED-LET SIT FOR 4 MINUTES BEFORE WASHING OFF. (Patient not taking: Reported on 10/3/2023) 120 mL 3    losartan (Cozaar) 25 mg tablet Take 1 tablet (25 mg) by mouth once daily.      pantoprazole (ProtoNix) 40 mg EC tablet Take 1 tablet (40 mg) by mouth once daily in the morning. Take before meals. Do not crush, " chew, or split.      permethrin (Elimite) 5 % cream APPLY 1 APPLICATION TOPICALLY DAILY USE ON YOUR SCALP ONCE, LEAVE ON OVERNIGHT, REPEAT ONE WEEK LATER, WASH OFF IN THE MORNING (Patient not taking: Reported on 10/3/2023) 60 g 0    pravastatin (Pravachol) 40 mg tablet Take 1 tablet (40 mg) by mouth once daily at bedtime.      predniSONE (Deltasone) 5 mg tablet Take 1 tablet (5 mg) by mouth once daily in the morning.      tacrolimus (Prograf) 1 mg capsule Take 3 capsule AM 3 Capsule PM      tacrolimus (Protopic) 0.1 % ointment APPLY TO AFFECTED AREA(S) TOPICALLY ONCE DAILY TO AFFECTED ITCHY/ IRRITATED AREAS (Patient not taking: Reported on 10/3/2023) 60 g 1    Vitamin D3 25 mcg (1,000 unit) capsule Take 2 capsules (50 mcg) by mouth once daily.           PHYSICAL EXAM:   Vitals:    10/12/23 0824 10/12/23 1521 10/12/23 1547 10/12/23 1959   BP: 166/78 177/84 159/83 168/81   BP Location:    Left arm   Patient Position: Lying Lying  Lying   Pulse: 73 67 80 74   Resp: 18 18  17   Temp: 37.1 °C (98.8 °F) 36.9 °C (98.4 °F)  36.9 °C (98.4 °F)   TempSrc: Skin   Tympanic   SpO2: 97% 98%  93%   Weight:       Height:            PHYSICAL EXAMINATION:    General appearance: alert oriented x 3  Skin: skin color, texture, turgor normal,   HEENT: Anicteric sclera.  Oropharynx mucosa moist  Neck: Supple, no adenopathy;   Back: no pain to palpation over spine or costovertebral angles,   Lungs: clear to auscultation, no wheezing or rhonchi  Heart: RRR without murmur, gallop, or rubs.   Abdomen: Abdomen soft, non-tender. Bowel sounds normal. No masses, organomegaly  Extremities: Extremities normal. No  edema, or skin discoloration.   Musculoskeletal: Spine range of motion normal. Muscular strength intact  Neuro: Oriented X  3    DATA: CBC, Coags, BMP, Mg, Phos   Results for orders placed or performed during the hospital encounter of 10/02/23 (from the past 96 hour(s))   CBC   Result Value Ref Range    WBC 2.7 (L) 4.4 - 11.3 x10*3/uL     nRBC 0.0 0.0 - 0.0 /100 WBCs    RBC 2.86 (L) 4.50 - 5.90 x10*6/uL    Hemoglobin 7.9 (L) 13.5 - 17.5 g/dL    Hematocrit 23.1 (L) 41.0 - 52.0 %    MCV 81 80 - 100 fL    MCH 27.6 26.0 - 34.0 pg    MCHC 34.2 32.0 - 36.0 g/dL    RDW 15.6 (H) 11.5 - 14.5 %    Platelets 98 (L) 150 - 450 x10*3/uL    MPV 9.3 7.5 - 11.5 fL   Basic Metabolic Panel   Result Value Ref Range    Glucose 142 (H) 74 - 99 mg/dL    Sodium 134 (L) 136 - 145 mmol/L    Potassium 4.2 3.5 - 5.3 mmol/L    Chloride 106 98 - 107 mmol/L    Bicarbonate 24 21 - 32 mmol/L    Anion Gap 8 (L) 10 - 20 mmol/L    Urea Nitrogen 19 6 - 23 mg/dL    Creatinine 2.51 (H) 0.50 - 1.30 mg/dL    eGFR 27 (L) >60 mL/min/1.73m*2    Calcium 9.6 8.6 - 10.3 mg/dL   POCT GLUCOSE   Result Value Ref Range    POCT Glucose 152 (H) 74 - 99 mg/dL   POCT GLUCOSE   Result Value Ref Range    POCT Glucose 136 (H) 74 - 99 mg/dL   POCT GLUCOSE   Result Value Ref Range    POCT Glucose 166 (H) 74 - 99 mg/dL   POCT GLUCOSE   Result Value Ref Range    POCT Glucose 223 (H) 74 - 99 mg/dL   CBC   Result Value Ref Range    WBC 2.5 (L) 4.4 - 11.3 x10*3/uL    nRBC 0.0 0.0 - 0.0 /100 WBCs    RBC 2.81 (L) 4.50 - 5.90 x10*6/uL    Hemoglobin 7.8 (L) 13.5 - 17.5 g/dL    Hematocrit 23.0 (L) 41.0 - 52.0 %    MCV 82 80 - 100 fL    MCH 27.8 26.0 - 34.0 pg    MCHC 33.9 32.0 - 36.0 g/dL    RDW 15.8 (H) 11.5 - 14.5 %    Platelets 96 (L) 150 - 450 x10*3/uL    MPV 9.2 7.5 - 11.5 fL   Basic Metabolic Panel   Result Value Ref Range    Glucose 171 (H) 74 - 99 mg/dL    Sodium 135 (L) 136 - 145 mmol/L    Potassium 4.2 3.5 - 5.3 mmol/L    Chloride 108 (H) 98 - 107 mmol/L    Bicarbonate 24 21 - 32 mmol/L    Anion Gap 7 (L) 10 - 20 mmol/L    Urea Nitrogen 22 6 - 23 mg/dL    Creatinine 2.53 (H) 0.50 - 1.30 mg/dL    eGFR 27 (L) >60 mL/min/1.73m*2    Calcium 10.1 8.6 - 10.3 mg/dL   POCT GLUCOSE   Result Value Ref Range    POCT Glucose 154 (H) 74 - 99 mg/dL   POCT GLUCOSE   Result Value Ref Range    POCT Glucose 145 (H) 74 - 99  mg/dL   POCT GLUCOSE   Result Value Ref Range    POCT Glucose 122 (H) 74 - 99 mg/dL   POCT GLUCOSE   Result Value Ref Range    POCT Glucose 173 (H) 74 - 99 mg/dL   POCT GLUCOSE   Result Value Ref Range    POCT Glucose 160 (H) 74 - 99 mg/dL   POCT GLUCOSE   Result Value Ref Range    POCT Glucose 69 (L) 74 - 99 mg/dL   POCT GLUCOSE   Result Value Ref Range    POCT Glucose 184 (H) 74 - 99 mg/dL   POCT GLUCOSE   Result Value Ref Range    POCT Glucose 207 (H) 74 - 99 mg/dL   POCT GLUCOSE   Result Value Ref Range    POCT Glucose 106 (H) 74 - 99 mg/dL   POCT GLUCOSE   Result Value Ref Range    POCT Glucose 147 (H) 74 - 99 mg/dL     MRI reviewed for thoracic and lumbar spine      ASSESSMENT AND PLAN:     Principal Problem:    Generalized abdominal pain    #Abdominal pain persists  Needs regular IV pain medications  Input from GI and pain management noted  No clear etiology of pain  Add tramadol  Check MRI spine, see orders. This was ordered yesterday and still waiting to be done   MRI spine noted and will defer epidural to pain management     #Suprapubic discomfort  Bladder ultrasound showed 60 cc postvoid residual  UA is negative for infection     #Acute on chronic kidney injury  History of renal transplant  Cont with monitoring, renal function at baseline     #Hypertension  Resume losartan     #Pancytopenia  Monitor numbers  No active signs of bleeding  Might need hematological work-up  However can be done as outpt    Will plan for dc to home tomorrow    Lul Aldana MD

## 2023-10-16 ENCOUNTER — APPOINTMENT (OUTPATIENT)
Dept: TRANSPLANT | Facility: HOSPITAL | Age: 68
End: 2023-10-16
Payer: COMMERCIAL

## 2023-10-16 ENCOUNTER — OFFICE VISIT (OUTPATIENT)
Dept: TRANSPLANT | Facility: HOSPITAL | Age: 68
End: 2023-10-16
Payer: COMMERCIAL

## 2023-10-16 ENCOUNTER — PHARMACY VISIT (OUTPATIENT)
Dept: PHARMACY | Facility: CLINIC | Age: 68
End: 2023-10-16
Payer: MEDICAID

## 2023-10-16 VITALS
OXYGEN SATURATION: 100 % | WEIGHT: 158.1 LBS | SYSTOLIC BLOOD PRESSURE: 140 MMHG | TEMPERATURE: 98.4 F | BODY MASS INDEX: 24.04 KG/M2 | HEART RATE: 62 BPM | DIASTOLIC BLOOD PRESSURE: 73 MMHG

## 2023-10-16 DIAGNOSIS — Z94.0 KIDNEY REPLACED BY TRANSPLANT (HHS-HCC): Primary | ICD-10-CM

## 2023-10-16 PROCEDURE — RXMED WILLOW AMBULATORY MEDICATION CHARGE

## 2023-10-16 PROCEDURE — 99213 OFFICE O/P EST LOW 20 MIN: CPT

## 2023-10-16 PROCEDURE — 1036F TOBACCO NON-USER: CPT

## 2023-10-16 PROCEDURE — 3048F LDL-C <100 MG/DL: CPT

## 2023-10-16 PROCEDURE — 3066F NEPHROPATHY DOC TX: CPT

## 2023-10-16 PROCEDURE — 3044F HG A1C LEVEL LT 7.0%: CPT

## 2023-10-16 PROCEDURE — 4010F ACE/ARB THERAPY RXD/TAKEN: CPT

## 2023-10-16 PROCEDURE — 3061F NEG MICROALBUMINURIA REV: CPT

## 2023-10-16 PROCEDURE — 3078F DIAST BP <80 MM HG: CPT

## 2023-10-16 PROCEDURE — 1111F DSCHRG MED/CURRENT MED MERGE: CPT

## 2023-10-16 PROCEDURE — 3008F BODY MASS INDEX DOCD: CPT

## 2023-10-16 PROCEDURE — 3077F SYST BP >= 140 MM HG: CPT

## 2023-10-16 PROCEDURE — 1160F RVW MEDS BY RX/DR IN RCRD: CPT

## 2023-10-16 PROCEDURE — 1159F MED LIST DOCD IN RCRD: CPT

## 2023-10-16 PROCEDURE — 1126F AMNT PAIN NOTED NONE PRSNT: CPT

## 2023-10-16 RX ORDER — GABAPENTIN 300 MG/1
300 CAPSULE ORAL 2 TIMES DAILY
Qty: 60 CAPSULE | Refills: 1 | Status: SHIPPED | OUTPATIENT
Start: 2023-10-16 | End: 2023-12-21 | Stop reason: HOSPADM

## 2023-10-16 RX ORDER — TRAMADOL HYDROCHLORIDE 50 MG/1
50 TABLET ORAL EVERY 6 HOURS PRN
Qty: 10 TABLET | Refills: 0 | Status: SHIPPED | OUTPATIENT
Start: 2023-10-16 | End: 2023-10-26

## 2023-10-16 ASSESSMENT — PAIN SCALES - GENERAL: PAINLEVEL: 0-NO PAIN

## 2023-10-16 NOTE — PROGRESS NOTES
Subjective   Manolo Bashir is a 68 y.o. male who underwent kidney transplant on 7/13/2013 (Kidney), 3/26/1994 (Kidney). Here today for follow-up. Complaining of ongoing abdominal pain. Was recently in the hospital where an extensive work up was done and he underwent cholecystectomy without resolution of his abdominal pain. He is following up with pain management.    Objective     Physical Exam  Constitutional:       Appearance: Normal appearance.   HENT:      Head: Normocephalic.      Mouth/Throat:      Mouth: Mucous membranes are moist.   Cardiovascular:      Rate and Rhythm: Normal rate and regular rhythm.   Pulmonary:      Effort: Pulmonary effort is normal.      Breath sounds: Normal breath sounds.   Abdominal:      General: Abdomen is flat.      Palpations: Abdomen is soft.   Musculoskeletal:         General: Normal range of motion.      Cervical back: Normal range of motion.   Skin:     General: Skin is warm and dry.   Neurological:      General: No focal deficit present.      Mental Status: He is alert.   Psychiatric:         Mood and Affect: Mood normal.       Lab Results   Component Value Date    CREATININE 2.53 (H) 10/10/2023    K 4.2 10/10/2023    GLUCOSE 171 (H) 10/10/2023    HCT 23.0 (L) 10/10/2023    WBC 2.5 (L) 10/10/2023    PLT 96 (L) 10/10/2023    CALCIUM 10.1 10/10/2023     Assessment/Plan     Stable renal allograft function. No changes to immunosuppression. Gave 30 pills of tramadol for pain and encouraged him to follow up with pain management.

## 2023-10-17 ENCOUNTER — OFFICE VISIT (OUTPATIENT)
Dept: HEMATOLOGY/ONCOLOGY | Facility: HOSPITAL | Age: 68
End: 2023-10-17
Payer: COMMERCIAL

## 2023-10-17 ENCOUNTER — APPOINTMENT (OUTPATIENT)
Dept: HEMATOLOGY/ONCOLOGY | Facility: HOSPITAL | Age: 68
End: 2023-10-17
Payer: COMMERCIAL

## 2023-10-17 VITALS
OXYGEN SATURATION: 100 % | WEIGHT: 153.88 LBS | HEIGHT: 68 IN | TEMPERATURE: 97 F | RESPIRATION RATE: 17 BRPM | DIASTOLIC BLOOD PRESSURE: 75 MMHG | SYSTOLIC BLOOD PRESSURE: 157 MMHG | BODY MASS INDEX: 23.32 KG/M2 | HEART RATE: 65 BPM

## 2023-10-17 DIAGNOSIS — D61.818 PANCYTOPENIA (MULTI): Primary | ICD-10-CM

## 2023-10-17 DIAGNOSIS — C61 ADENOCARCINOMA OF PROSTATE (MULTI): ICD-10-CM

## 2023-10-17 PROBLEM — M89.8X8 MASS OF SPINE: Status: RESOLVED | Noted: 2023-08-21 | Resolved: 2023-10-17

## 2023-10-17 PROBLEM — H25.812 COMBINED FORM OF AGE-RELATED CATARACT, LEFT EYE: Status: RESOLVED | Noted: 2023-08-17 | Resolved: 2023-10-17

## 2023-10-17 PROBLEM — Q27.9: Status: RESOLVED | Noted: 2023-08-17 | Resolved: 2023-10-17

## 2023-10-17 PROBLEM — H01.003 BLEPHARITIS OF BOTH EYES: Status: RESOLVED | Noted: 2023-08-17 | Resolved: 2023-10-17

## 2023-10-17 PROBLEM — E13.9 SECONDARY DIABETES MELLITUS (MULTI): Status: RESOLVED | Noted: 2023-08-17 | Resolved: 2023-10-17

## 2023-10-17 PROBLEM — M79.641 HAND PAIN, RIGHT: Status: RESOLVED | Noted: 2023-08-17 | Resolved: 2023-10-17

## 2023-10-17 PROBLEM — E66.3 OVERWEIGHT: Status: RESOLVED | Noted: 2023-08-17 | Resolved: 2023-10-17

## 2023-10-17 PROBLEM — Z86.19 HISTORY OF HEPATITIS C: Status: RESOLVED | Noted: 2023-08-17 | Resolved: 2023-10-17

## 2023-10-17 PROBLEM — E83.52 HYPERCALCEMIA: Status: RESOLVED | Noted: 2023-08-17 | Resolved: 2023-10-17

## 2023-10-17 PROBLEM — R51.9 HEADACHE: Status: RESOLVED | Noted: 2023-08-17 | Resolved: 2023-10-17

## 2023-10-17 PROBLEM — R94.31 ABNORMAL EKG: Status: RESOLVED | Noted: 2023-08-17 | Resolved: 2023-10-17

## 2023-10-17 PROBLEM — N18.30 STAGE 3 CHRONIC KIDNEY DISEASE (MULTI): Status: RESOLVED | Noted: 2023-08-17 | Resolved: 2023-10-17

## 2023-10-17 PROBLEM — M19.90 ARTHRITIS: Status: RESOLVED | Noted: 2023-08-17 | Resolved: 2023-10-17

## 2023-10-17 PROBLEM — B85.2 LICE: Status: RESOLVED | Noted: 2023-08-17 | Resolved: 2023-10-17

## 2023-10-17 PROBLEM — R19.7 DIARRHEA: Status: RESOLVED | Noted: 2023-08-17 | Resolved: 2023-10-17

## 2023-10-17 PROBLEM — H10.13 ALLERGIC CONJUNCTIVITIS OF BOTH EYES: Status: RESOLVED | Noted: 2023-08-17 | Resolved: 2023-10-17

## 2023-10-17 PROBLEM — M19.071 OSTEOARTHRITIS OF ANKLE, RIGHT: Status: RESOLVED | Noted: 2023-08-17 | Resolved: 2023-10-17

## 2023-10-17 PROBLEM — S62.669A: Status: RESOLVED | Noted: 2023-08-21 | Resolved: 2023-10-17

## 2023-10-17 PROBLEM — B35.3 TINEA PEDIS OF BOTH FEET: Status: RESOLVED | Noted: 2023-08-17 | Resolved: 2023-10-17

## 2023-10-17 PROBLEM — M19.072 OSTEOARTHRITIS OF ANKLE, LEFT: Status: RESOLVED | Noted: 2023-08-17 | Resolved: 2023-10-17

## 2023-10-17 PROBLEM — R45.89 DEPRESSED MOOD: Status: RESOLVED | Noted: 2023-08-17 | Resolved: 2023-10-17

## 2023-10-17 PROBLEM — R10.84 GENERALIZED ABDOMINAL PAIN: Status: RESOLVED | Noted: 2023-10-05 | Resolved: 2023-10-17

## 2023-10-17 PROBLEM — M25.512 SHOULDER PAIN, LEFT: Status: RESOLVED | Noted: 2023-08-17 | Resolved: 2023-10-17

## 2023-10-17 PROBLEM — R36.9 PENILE DISCHARGE: Status: RESOLVED | Noted: 2023-08-17 | Resolved: 2023-10-17

## 2023-10-17 PROBLEM — H01.006 BLEPHARITIS OF BOTH EYES: Status: RESOLVED | Noted: 2023-08-17 | Resolved: 2023-10-17

## 2023-10-17 PROBLEM — G89.29 CHRONIC PAIN OF BOTH ANKLES: Status: RESOLVED | Noted: 2023-08-17 | Resolved: 2023-10-17

## 2023-10-17 PROBLEM — R32 URINARY INCONTINENCE: Status: RESOLVED | Noted: 2023-08-17 | Resolved: 2023-10-17

## 2023-10-17 PROBLEM — Z79.899 HIGH RISK MEDICATION USE: Status: RESOLVED | Noted: 2023-08-17 | Resolved: 2023-10-17

## 2023-10-17 PROBLEM — R91.1 LUNG NODULE: Status: RESOLVED | Noted: 2023-08-21 | Resolved: 2023-10-17

## 2023-10-17 PROBLEM — G47.00 INSOMNIA: Status: RESOLVED | Noted: 2023-08-17 | Resolved: 2023-10-17

## 2023-10-17 PROBLEM — M25.572 CHRONIC PAIN OF BOTH ANKLES: Status: RESOLVED | Noted: 2023-08-17 | Resolved: 2023-10-17

## 2023-10-17 PROBLEM — H53.8 BLURRY VISION, BILATERAL: Status: RESOLVED | Noted: 2023-08-17 | Resolved: 2023-10-17

## 2023-10-17 PROBLEM — M19.071 PRIMARY OSTEOARTHRITIS OF BOTH ANKLES: Status: RESOLVED | Noted: 2023-08-17 | Resolved: 2023-10-17

## 2023-10-17 PROBLEM — R80.9 PROTEINURIA: Status: RESOLVED | Noted: 2023-08-21 | Resolved: 2023-10-17

## 2023-10-17 PROBLEM — M79.89 LEG SWELLING: Status: RESOLVED | Noted: 2023-08-17 | Resolved: 2023-10-17

## 2023-10-17 PROBLEM — K63.5 POLYP, COLONIC: Status: RESOLVED | Noted: 2023-08-17 | Resolved: 2023-10-17

## 2023-10-17 PROBLEM — T81.49XA SURGICAL SITE INFECTION: Status: RESOLVED | Noted: 2023-08-21 | Resolved: 2023-10-17

## 2023-10-17 PROBLEM — K59.00 CONSTIPATION: Status: RESOLVED | Noted: 2023-08-17 | Resolved: 2023-10-17

## 2023-10-17 PROBLEM — S99.911A RIGHT ANKLE INJURY: Status: RESOLVED | Noted: 2023-08-17 | Resolved: 2023-10-17

## 2023-10-17 PROBLEM — E21.3 HYPERPARATHYROIDISM (MULTI): Status: RESOLVED | Noted: 2023-08-17 | Resolved: 2023-10-17

## 2023-10-17 PROBLEM — N39.0 UTI (URINARY TRACT INFECTION): Status: RESOLVED | Noted: 2023-08-17 | Resolved: 2023-10-17

## 2023-10-17 PROBLEM — M19.072 PRIMARY OSTEOARTHRITIS OF BOTH ANKLES: Status: RESOLVED | Noted: 2023-08-17 | Resolved: 2023-10-17

## 2023-10-17 PROBLEM — M19.171 POST-TRAUMATIC OSTEOARTHRITIS OF BOTH ANKLES: Status: RESOLVED | Noted: 2023-08-17 | Resolved: 2023-10-17

## 2023-10-17 PROBLEM — E01.2 COLLOID GOITER: Status: RESOLVED | Noted: 2023-08-17 | Resolved: 2023-10-17

## 2023-10-17 PROBLEM — L29.9 ITCHING: Status: RESOLVED | Noted: 2023-08-17 | Resolved: 2023-10-17

## 2023-10-17 PROBLEM — J30.9 ALLERGIC RHINITIS: Status: RESOLVED | Noted: 2023-08-17 | Resolved: 2023-10-17

## 2023-10-17 PROBLEM — A64 STD (SEXUALLY TRANSMITTED DISEASE): Status: RESOLVED | Noted: 2023-08-17 | Resolved: 2023-10-17

## 2023-10-17 PROBLEM — B35.1 ONYCHOMYCOSIS: Status: RESOLVED | Noted: 2023-08-17 | Resolved: 2023-10-17

## 2023-10-17 PROBLEM — H25.811 COMBINED FORM OF AGE-RELATED CATARACT, RIGHT EYE: Status: RESOLVED | Noted: 2023-08-17 | Resolved: 2023-10-17

## 2023-10-17 PROBLEM — S69.90XA THUMB INJURY: Status: RESOLVED | Noted: 2023-08-17 | Resolved: 2023-10-17

## 2023-10-17 PROBLEM — M19.172 POST-TRAUMATIC OSTEOARTHRITIS OF BOTH ANKLES: Status: RESOLVED | Noted: 2023-08-17 | Resolved: 2023-10-17

## 2023-10-17 PROBLEM — R53.83 FATIGUE: Status: RESOLVED | Noted: 2023-08-17 | Resolved: 2023-10-17

## 2023-10-17 PROBLEM — R26.2 DIFFICULTY WALKING: Status: RESOLVED | Noted: 2023-08-17 | Resolved: 2023-10-17

## 2023-10-17 PROBLEM — I20.9 ANGINA PECTORIS (CMS-HCC): Status: RESOLVED | Noted: 2023-08-17 | Resolved: 2023-10-17

## 2023-10-17 PROBLEM — R11.2 NAUSEA AND/OR VOMITING: Status: RESOLVED | Noted: 2023-08-17 | Resolved: 2023-10-17

## 2023-10-17 PROBLEM — M62.469 GASTROCNEMIUS EQUINUS: Status: RESOLVED | Noted: 2023-08-17 | Resolved: 2023-10-17

## 2023-10-17 PROBLEM — M25.571 CHRONIC PAIN OF BOTH ANKLES: Status: RESOLVED | Noted: 2023-08-17 | Resolved: 2023-10-17

## 2023-10-17 PROBLEM — H52.7 REFRACTION ERROR: Status: RESOLVED | Noted: 2023-08-17 | Resolved: 2023-10-17

## 2023-10-17 PROBLEM — D12.6 TUBULAR ADENOMA OF COLON: Status: RESOLVED | Noted: 2023-08-17 | Resolved: 2023-10-17

## 2023-10-17 PROBLEM — H57.13 PAIN OF BOTH EYES: Status: RESOLVED | Noted: 2023-08-17 | Resolved: 2023-10-17

## 2023-10-17 PROBLEM — D64.9 ANEMIA: Status: RESOLVED | Noted: 2023-08-17 | Resolved: 2023-10-17

## 2023-10-17 PROCEDURE — 1125F AMNT PAIN NOTED PAIN PRSNT: CPT | Performed by: INTERNAL MEDICINE

## 2023-10-17 PROCEDURE — 3078F DIAST BP <80 MM HG: CPT | Performed by: INTERNAL MEDICINE

## 2023-10-17 PROCEDURE — 4010F ACE/ARB THERAPY RXD/TAKEN: CPT | Performed by: INTERNAL MEDICINE

## 2023-10-17 PROCEDURE — 3066F NEPHROPATHY DOC TX: CPT | Performed by: INTERNAL MEDICINE

## 2023-10-17 PROCEDURE — 1036F TOBACCO NON-USER: CPT | Performed by: INTERNAL MEDICINE

## 2023-10-17 PROCEDURE — 1111F DSCHRG MED/CURRENT MED MERGE: CPT | Performed by: INTERNAL MEDICINE

## 2023-10-17 PROCEDURE — 3077F SYST BP >= 140 MM HG: CPT | Performed by: INTERNAL MEDICINE

## 2023-10-17 PROCEDURE — 3008F BODY MASS INDEX DOCD: CPT | Performed by: INTERNAL MEDICINE

## 2023-10-17 PROCEDURE — 99205 OFFICE O/P NEW HI 60 MIN: CPT | Performed by: INTERNAL MEDICINE

## 2023-10-17 PROCEDURE — 1159F MED LIST DOCD IN RCRD: CPT | Performed by: INTERNAL MEDICINE

## 2023-10-17 PROCEDURE — 3052F HG A1C>EQUAL 8.0%<EQUAL 9.0%: CPT | Performed by: INTERNAL MEDICINE

## 2023-10-17 PROCEDURE — 99215 OFFICE O/P EST HI 40 MIN: CPT | Performed by: INTERNAL MEDICINE

## 2023-10-17 ASSESSMENT — PAIN SCALES - GENERAL: PAINLEVEL: 6

## 2023-10-17 NOTE — PROGRESS NOTES
"Patient ID: Manolo Bashir is a 68 y.o. male.  Referring Physician: No referring provider defined for this encounter.  Primary Care Provider: No primary care provider on file.      HISTORY  Mr. Bashir is a 68 y gentleman with complex medical history who is referred after recent hospitalization for abdominal pain for further evaluation of evolving pancytopenia.  He seems to struggle some with dates, but except for anemia for several years treated with iron supplements, does not recall being told about abnormal blood counts until last few months.  He has not required transfusion support and denies any recent infections.  Upon reviewing his blood counts, he had worsening anemia (10-11 down to 7.4 g/dL) and platelets (200s down to 69K) over last 2-3 months.  He has had chronic lymphopenia but has not been neutropenic.  He complains predominantly of GI symptoms including abdominal pain, nausea, and vomiting for which he has been hospitalized 2x recently without clear etiology.  His symptoms are about the same as when he as discharged a few days ago.  Denies current fevers, chills, pruritus, night sweats, LAD, bleeding, or bruising.      Objective   BSA: 1.83 meters squared  /75   Pulse 65   Temp 36.1 °C (97 °F)   Resp 17   Ht (S) 1.727 m (5' 7.99\") Comment: height verification  Wt 69.8 kg (153 lb 14.1 oz)   SpO2 100%   BMI 23.40 kg/m²     Family History   Problem Relation Name Age of Onset    Bone cancer Mother      Other (corona's sarcome of the bone marrow) Mother      Prostate cancer Father      Diabetes Other Family Hist     Hypertension Other Family Hist      Oncology History    No history exists.       Manolo Bashir  reports that he has never smoked. He has never used smokeless tobacco.  He  reports current alcohol use.  He  reports current drug use. Drug: Oxycodone.    Physical Exam  Vitals reviewed.   Constitutional:       Appearance: Normal appearance.   Eyes:      Conjunctiva/sclera: Conjunctivae " normal.      Pupils: Pupils are equal, round, and reactive to light.   Skin:     General: Skin is warm and dry.      Findings: No rash.   Psychiatric:         Mood and Affect: Mood normal.         Assessment/Plan      Pancytopenia (CMS/HCC)  Referred for evaluation of pancytopenia by inpatient teams.  He has had some mild intermittent cytopenias, but have worsened particularly over past 2-3 months.  Prior work up has included Epo 30, normal folate, borderline B12.  Has possible history of iron deficiency, but has been on supplement and unlikely to entirely explain abnormal counts.  Have recommended bone marrow evaluation and will check MMAhomocysteine and other potential viral etiologies given ongoing immunosuppression as well.  Will RTC approximately 2 weeks post BM evaluation.    Chronic hepatitis C (CMS/[facility])  Recheck hep C viral load.     Time Spent  Prep time on day of patient encounter: 20 minutes  Time spent directly with patient, family or caregiver: 25 minutes  Additional Time Spent on Patient Care Activities: 9 minutes  Documentation Time: 10 minutes  Other Time Spent: 0 minutes  Total: 64 minutes          Elizabeth Alejandro MD

## 2023-10-17 NOTE — ASSESSMENT & PLAN NOTE
Referred for evaluation of pancytopenia by inpatient teams.  He has had some mild intermittent cytopenias, but have worsened particularly over past 2-3 months.  Prior work up has included Epo 30, normal folate, borderline B12.  Has possible history of iron deficiency, but has been on supplement and unlikely to entirely explain abnormal counts.  Have recommended bone marrow evaluation and will check MMAhomocysteine and other potential viral etiologies given ongoing immunosuppression as well.  Will RTC approximately 2 weeks post BM evaluation.

## 2023-10-18 ENCOUNTER — LAB (OUTPATIENT)
Dept: LAB | Facility: LAB | Age: 68
End: 2023-10-18
Payer: COMMERCIAL

## 2023-10-18 ENCOUNTER — OFFICE VISIT (OUTPATIENT)
Dept: DERMATOLOGY | Facility: CLINIC | Age: 68
End: 2023-10-18
Payer: COMMERCIAL

## 2023-10-18 DIAGNOSIS — L30.9 DERMATITIS: Primary | ICD-10-CM

## 2023-10-18 DIAGNOSIS — D61.818 PANCYTOPENIA (MULTI): ICD-10-CM

## 2023-10-18 DIAGNOSIS — C61 ADENOCARCINOMA OF PROSTATE (MULTI): ICD-10-CM

## 2023-10-18 LAB
ALBUMIN SERPL BCP-MCNC: 3.1 G/DL (ref 3.4–5)
ALP SERPL-CCNC: 65 U/L (ref 33–136)
ALT SERPL W P-5'-P-CCNC: 7 U/L (ref 10–52)
ANION GAP SERPL CALC-SCNC: <7 MMOL/L (ref 10–20)
AST SERPL W P-5'-P-CCNC: 14 U/L (ref 9–39)
BASOPHILS # BLD AUTO: 0.01 X10*3/UL (ref 0–0.1)
BASOPHILS NFR BLD AUTO: 0.5 %
BILIRUB SERPL-MCNC: 0.5 MG/DL (ref 0–1.2)
BUN SERPL-MCNC: 28 MG/DL (ref 6–23)
CALCIUM SERPL-MCNC: 9.7 MG/DL (ref 8.6–10.6)
CHLORIDE SERPL-SCNC: 106 MMOL/L (ref 98–107)
CO2 SERPL-SCNC: 31 MMOL/L (ref 21–32)
CREAT SERPL-MCNC: 2.57 MG/DL (ref 0.5–1.3)
EOSINOPHIL # BLD AUTO: 0.16 X10*3/UL (ref 0–0.7)
EOSINOPHIL NFR BLD AUTO: 7.5 %
ERYTHROCYTE [DISTWIDTH] IN BLOOD BY AUTOMATED COUNT: 16.1 % (ref 11.5–14.5)
GFR SERPL CREATININE-BSD FRML MDRD: 27 ML/MIN/1.73M*2
GLUCOSE SERPL-MCNC: 153 MG/DL (ref 74–99)
HCT VFR BLD AUTO: 24.3 % (ref 41–52)
HGB BLD-MCNC: 8.1 G/DL (ref 13.5–17.5)
HGB RETIC QN: 32 PG (ref 28–38)
HIV 1+2 AB+HIV1 P24 AG SERPL QL IA: NONREACTIVE
IMM GRANULOCYTES # BLD AUTO: 0 X10*3/UL (ref 0–0.7)
IMM GRANULOCYTES NFR BLD AUTO: 0 % (ref 0–0.9)
IMMATURE RETIC FRACTION: 3.5 %
IRON SATN MFR SERPL: 24 % (ref 25–45)
IRON SERPL-MCNC: 41 UG/DL (ref 35–150)
LYMPHOCYTES # BLD AUTO: 0.75 X10*3/UL (ref 1.2–4.8)
LYMPHOCYTES NFR BLD AUTO: 35 %
MCH RBC QN AUTO: 28.1 PG (ref 26–34)
MCHC RBC AUTO-ENTMCNC: 33.3 G/DL (ref 32–36)
MCV RBC AUTO: 84 FL (ref 80–100)
MONOCYTES # BLD AUTO: 0.24 X10*3/UL (ref 0.1–1)
MONOCYTES NFR BLD AUTO: 11.2 %
NEUTROPHILS # BLD AUTO: 0.98 X10*3/UL (ref 1.2–7.7)
NEUTROPHILS NFR BLD AUTO: 45.8 %
NRBC BLD-RTO: 0 /100 WBCS (ref 0–0)
PLATELET # BLD AUTO: 115 X10*3/UL (ref 150–450)
PMV BLD AUTO: 10.6 FL (ref 7.5–11.5)
POTASSIUM SERPL-SCNC: 4.3 MMOL/L (ref 3.5–5.3)
PROT SERPL-MCNC: 5.2 G/DL (ref 6.4–8.2)
PSA SERPL-MCNC: <0.1 NG/ML
RBC # BLD AUTO: 2.88 X10*6/UL (ref 4.5–5.9)
RETICS #: 0.03 X10*6/UL (ref 0.02–0.11)
RETICS/RBC NFR AUTO: 1.2 % (ref 0.5–2)
SODIUM SERPL-SCNC: 139 MMOL/L (ref 136–145)
TIBC SERPL-MCNC: 170 UG/DL (ref 240–445)
UIBC SERPL-MCNC: 129 UG/DL (ref 110–370)
WBC # BLD AUTO: 2.1 X10*3/UL (ref 4.4–11.3)

## 2023-10-18 PROCEDURE — 3061F NEG MICROALBUMINURIA REV: CPT | Performed by: STUDENT IN AN ORGANIZED HEALTH CARE EDUCATION/TRAINING PROGRAM

## 2023-10-18 PROCEDURE — 1159F MED LIST DOCD IN RCRD: CPT | Performed by: STUDENT IN AN ORGANIZED HEALTH CARE EDUCATION/TRAINING PROGRAM

## 2023-10-18 PROCEDURE — 84153 ASSAY OF PSA TOTAL: CPT

## 2023-10-18 PROCEDURE — 83010 ASSAY OF HAPTOGLOBIN QUANT: CPT

## 2023-10-18 PROCEDURE — 80053 COMPREHEN METABOLIC PANEL: CPT

## 2023-10-18 PROCEDURE — 1126F AMNT PAIN NOTED NONE PRSNT: CPT | Performed by: STUDENT IN AN ORGANIZED HEALTH CARE EDUCATION/TRAINING PROGRAM

## 2023-10-18 PROCEDURE — 3008F BODY MASS INDEX DOCD: CPT | Performed by: STUDENT IN AN ORGANIZED HEALTH CARE EDUCATION/TRAINING PROGRAM

## 2023-10-18 PROCEDURE — 86334 IMMUNOFIX E-PHORESIS SERUM: CPT

## 2023-10-18 PROCEDURE — 86320 SERUM IMMUNOELECTROPHORESIS: CPT

## 2023-10-18 PROCEDURE — 83540 ASSAY OF IRON: CPT

## 2023-10-18 PROCEDURE — 87522 HEPATITIS C REVRS TRNSCRPJ: CPT

## 2023-10-18 PROCEDURE — 83615 LACTATE (LD) (LDH) ENZYME: CPT

## 2023-10-18 PROCEDURE — 84165 PROTEIN E-PHORESIS SERUM: CPT

## 2023-10-18 PROCEDURE — 1111F DSCHRG MED/CURRENT MED MERGE: CPT | Performed by: STUDENT IN AN ORGANIZED HEALTH CARE EDUCATION/TRAINING PROGRAM

## 2023-10-18 PROCEDURE — 3044F HG A1C LEVEL LT 7.0%: CPT | Performed by: STUDENT IN AN ORGANIZED HEALTH CARE EDUCATION/TRAINING PROGRAM

## 2023-10-18 PROCEDURE — 3048F LDL-C <100 MG/DL: CPT | Performed by: STUDENT IN AN ORGANIZED HEALTH CARE EDUCATION/TRAINING PROGRAM

## 2023-10-18 PROCEDURE — 1125F AMNT PAIN NOTED PAIN PRSNT: CPT | Performed by: STUDENT IN AN ORGANIZED HEALTH CARE EDUCATION/TRAINING PROGRAM

## 2023-10-18 PROCEDURE — 87389 HIV-1 AG W/HIV-1&-2 AB AG IA: CPT

## 2023-10-18 PROCEDURE — 99213 OFFICE O/P EST LOW 20 MIN: CPT | Performed by: STUDENT IN AN ORGANIZED HEALTH CARE EDUCATION/TRAINING PROGRAM

## 2023-10-18 PROCEDURE — 4010F ACE/ARB THERAPY RXD/TAKEN: CPT | Performed by: STUDENT IN AN ORGANIZED HEALTH CARE EDUCATION/TRAINING PROGRAM

## 2023-10-18 PROCEDURE — 89240 UNLISTED MISC PATH TEST: CPT | Performed by: STUDENT IN AN ORGANIZED HEALTH CARE EDUCATION/TRAINING PROGRAM

## 2023-10-18 PROCEDURE — 83521 IG LIGHT CHAINS FREE EACH: CPT

## 2023-10-18 PROCEDURE — 36415 COLL VENOUS BLD VENIPUNCTURE: CPT

## 2023-10-18 PROCEDURE — 87799 DETECT AGENT NOS DNA QUANT: CPT

## 2023-10-18 PROCEDURE — 1160F RVW MEDS BY RX/DR IN RCRD: CPT | Performed by: STUDENT IN AN ORGANIZED HEALTH CARE EDUCATION/TRAINING PROGRAM

## 2023-10-18 PROCEDURE — 3052F HG A1C>EQUAL 8.0%<EQUAL 9.0%: CPT | Performed by: STUDENT IN AN ORGANIZED HEALTH CARE EDUCATION/TRAINING PROGRAM

## 2023-10-18 PROCEDURE — 83921 ORGANIC ACID SINGLE QUANT: CPT

## 2023-10-18 PROCEDURE — 1036F TOBACCO NON-USER: CPT | Performed by: STUDENT IN AN ORGANIZED HEALTH CARE EDUCATION/TRAINING PROGRAM

## 2023-10-18 PROCEDURE — 83550 IRON BINDING TEST: CPT

## 2023-10-18 PROCEDURE — 85045 AUTOMATED RETICULOCYTE COUNT: CPT

## 2023-10-18 PROCEDURE — 3066F NEPHROPATHY DOC TX: CPT | Performed by: STUDENT IN AN ORGANIZED HEALTH CARE EDUCATION/TRAINING PROGRAM

## 2023-10-18 PROCEDURE — 85025 COMPLETE CBC W/AUTO DIFF WBC: CPT

## 2023-10-18 RX ORDER — TACROLIMUS 1 MG/G
OINTMENT TOPICAL 2 TIMES DAILY
Qty: 30 G | Refills: 11 | Status: SHIPPED | OUTPATIENT
Start: 2023-10-18 | End: 2024-01-21 | Stop reason: HOSPADM

## 2023-10-18 ASSESSMENT — DERMATOLOGY PATIENT ASSESSMENT
HAVE YOU HAD OR DO YOU HAVE VASCULAR DISEASE: NO
DO YOU HAVE ANY NEW OR CHANGING LESIONS: NO
HAVE YOU HAD OR DO YOU HAVE A STAPH INFECTION: NO
DO YOU USE A TANNING BED: NO
ARE YOU AN ORGAN TRANSPLANT RECIPIENT: NO

## 2023-10-18 ASSESSMENT — DERMATOLOGY QUALITY OF LIFE (QOL) ASSESSMENT
WHAT SINGLE SKIN CONDITION LISTED BELOW IS THE PATIENT ANSWERING THE QUALITY-OF-LIFE ASSESSMENT QUESTIONS ABOUT: NONE OF THE ABOVE
RATE HOW EMOTIONALLY BOTHERED YOU ARE BY YOUR SKIN PROBLEM (FOR EXAMPLE, WORRY, EMBARRASSMENT, FRUSTRATION): 1
RATE HOW BOTHERED YOU ARE BY SYMPTOMS OF YOUR SKIN PROBLEM (EG, ITCHING, STINGING BURNING, HURTING OR SKIN IRRITATION): 1
RATE HOW BOTHERED YOU ARE BY EFFECTS OF YOUR SKIN PROBLEMS ON YOUR ACTIVITIES (EG, GOING OUT, ACCOMPLISHING WHAT YOU WANT, WORK ACTIVITIES OR YOUR RELATIONSHIPS WITH OTHERS): 1

## 2023-10-18 ASSESSMENT — ITCH NUMERIC RATING SCALE: HOW SEVERE IS YOUR ITCHING?: 0

## 2023-10-18 ASSESSMENT — PATIENT GLOBAL ASSESSMENT (PGA): PATIENT GLOBAL ASSESSMENT: PATIENT GLOBAL ASSESSMENT:  2 - MILD

## 2023-10-18 NOTE — PROGRESS NOTES
"Subjective     Manolo Bashir is a 68 y.o. male who presents for the following: Rash (Scalp and penis).     Patient reports tacrolimus ointment was not covered by insurance so he has not been using anything for the rash on his penis.    He used permethrin cream on his scalp and clobetasol solution and ketoconazole shampoo with resolution of itch.    Review of Systems:  No other skin or systemic complaints other than what is documented elsewhere in the note.    The following portions of the chart were reviewed this encounter and updated as appropriate:          Skin Cancer History  No skin cancer on file.      Specialty Problems    None       Objective   Well appearing patient in no apparent distress; mood and affect are within normal limits.    A focused skin examination was performed. All findings within normal limits unless otherwise noted below.    Assessment/Plan   1. Dermatitis  Dorsal Penile Shaft, Mid Frontal Scalp  No evidence of skin disease on the scalp. The penis was not examined at this visit as he had a visit one month ago but is returning since the tacrolimus ointment was not covered by insurance.    - Resolution of scalp itch, likely secondary to seborrheic dermatitis with clobetasol solution and ketoconazole shampoo. Initially reported as \"bugs on the scalp\" and thought to be delusions of parasitosis but patient reports resolution with treatments  - Advised to continue these medications prn  - For dermatitis of the penis discussed at last visit, will second tacrolimus 0.1% ointment to [facility] pharmacy for patient to use GoodRx coupon  - RTC prn or in 1 year for refills    Related Medications  tacrolimus (Protopic) 0.1 % ointment  Apply topically 2 times a day.          "

## 2023-10-19 ENCOUNTER — APPOINTMENT (OUTPATIENT)
Dept: CARDIOLOGY | Facility: HOSPITAL | Age: 68
End: 2023-10-19
Payer: COMMERCIAL

## 2023-10-19 ENCOUNTER — APPOINTMENT (OUTPATIENT)
Dept: RADIOLOGY | Facility: HOSPITAL | Age: 68
End: 2023-10-19
Payer: COMMERCIAL

## 2023-10-19 ENCOUNTER — HOSPITAL ENCOUNTER (INPATIENT)
Facility: HOSPITAL | Age: 68
LOS: 1 days | Discharge: HOME | End: 2023-10-20
Attending: EMERGENCY MEDICINE | Admitting: STUDENT IN AN ORGANIZED HEALTH CARE EDUCATION/TRAINING PROGRAM
Payer: COMMERCIAL

## 2023-10-19 DIAGNOSIS — D61.818 PANCYTOPENIA (MULTI): Primary | ICD-10-CM

## 2023-10-19 DIAGNOSIS — E21.2 TERTIARY HYPERPARATHYROIDISM (MULTI): ICD-10-CM

## 2023-10-19 DIAGNOSIS — Z94.0 KIDNEY REPLACED BY TRANSPLANT (HHS-HCC): ICD-10-CM

## 2023-10-19 DIAGNOSIS — R10.84 GENERALIZED ABDOMINAL PAIN: ICD-10-CM

## 2023-10-19 LAB
ABO GROUP (TYPE) IN BLOOD: NORMAL
ALBUMIN SERPL BCP-MCNC: 3.1 G/DL (ref 3.4–5)
ALP SERPL-CCNC: 61 U/L (ref 33–136)
ALT SERPL W P-5'-P-CCNC: 6 U/L (ref 10–52)
ANION GAP SERPL CALC-SCNC: 9 MMOL/L (ref 10–20)
ANTIBODY SCREEN: NORMAL
APPEARANCE UR: CLEAR
AST SERPL W P-5'-P-CCNC: 19 U/L (ref 9–39)
ATRIAL RATE: 67 BPM
BASOPHILS # BLD AUTO: ABNORMAL 10*3/UL
BASOPHILS NFR BLD AUTO: ABNORMAL %
BILIRUB SERPL-MCNC: 0.5 MG/DL (ref 0–1.2)
BILIRUB UR STRIP.AUTO-MCNC: NEGATIVE MG/DL
BLOOD EXPIRATION DATE: NORMAL
BNP SERPL-MCNC: 174 PG/ML (ref 0–99)
BUN SERPL-MCNC: 28 MG/DL (ref 6–23)
CALCIUM SERPL-MCNC: 10 MG/DL (ref 8.6–10.6)
CARDIAC TROPONIN I PNL SERPL HS: 48 NG/L (ref 0–53)
CARDIAC TROPONIN I PNL SERPL HS: 53 NG/L (ref 0–53)
CHLORIDE SERPL-SCNC: 109 MMOL/L (ref 98–107)
CMV DNA SERPL NAA+PROBE-LOG IU: NORMAL {LOG_IU}/ML
CO2 SERPL-SCNC: 23 MMOL/L (ref 21–32)
COLOR UR: ABNORMAL
CREAT SERPL-MCNC: 2.74 MG/DL (ref 0.5–1.3)
DISPENSE STATUS: NORMAL
EBV DNA SPEC NAA+PROBE-LOG#: NORMAL {LOG_COPIES}/ML
EOSINOPHIL # BLD AUTO: ABNORMAL 10*3/UL
EOSINOPHIL NFR BLD AUTO: ABNORMAL %
ERYTHROCYTE [DISTWIDTH] IN BLOOD BY AUTOMATED COUNT: 15.8 % (ref 11.5–14.5)
FLUAV RNA RESP QL NAA+PROBE: NOT DETECTED
FLUBV RNA RESP QL NAA+PROBE: NOT DETECTED
GFR SERPL CREATININE-BSD FRML MDRD: 25 ML/MIN/1.73M*2
GLUCOSE SERPL-MCNC: 132 MG/DL (ref 74–99)
GLUCOSE UR STRIP.AUTO-MCNC: NEGATIVE MG/DL
HAPTOGLOB SERPL NEPH-MCNC: NORMAL G/DL
HCT VFR BLD AUTO: 18.4 % (ref 41–52)
HCV RNA SERPL NAA+PROBE-ACNC: NOT DETECTED K[IU]/ML
HCV RNA SERPL NAA+PROBE-LOG IU: NORMAL {LOG_IU}/ML
HGB BLD-MCNC: 6.6 G/DL (ref 13.5–17.5)
HOLD SPECIMEN: NORMAL
IMM GRANULOCYTES # BLD AUTO: 0 X10*3/UL (ref 0–0.7)
IMM GRANULOCYTES NFR BLD AUTO: 0 % (ref 0–0.9)
KETONES UR STRIP.AUTO-MCNC: NEGATIVE MG/DL
LABORATORY COMMENT REPORT: NOT DETECTED
LABORATORY COMMENT REPORT: NOT DETECTED
LDH SERPL L TO P-CCNC: 144 U/L (ref 84–246)
LDH SERPL L TO P-CCNC: 156 U/L (ref 84–246)
LEUKOCYTE ESTERASE UR QL STRIP.AUTO: NEGATIVE
LIPASE SERPL-CCNC: 7 U/L (ref 9–82)
LYMPHOCYTES # BLD AUTO: ABNORMAL 10*3/UL
LYMPHOCYTES NFR BLD AUTO: ABNORMAL %
MCH RBC QN AUTO: 27.7 PG (ref 26–34)
MCHC RBC AUTO-ENTMCNC: 35.9 G/DL (ref 32–36)
MCV RBC AUTO: 77 FL (ref 80–100)
MONOCYTES # BLD AUTO: ABNORMAL 10*3/UL
MONOCYTES NFR BLD AUTO: ABNORMAL %
NEUTROPHILS # BLD AUTO: ABNORMAL 10*3/UL
NEUTROPHILS NFR BLD AUTO: ABNORMAL %
NITRITE UR QL STRIP.AUTO: NEGATIVE
NRBC BLD-RTO: 0 /100 WBCS (ref 0–0)
P AXIS: -17 DEGREES
P OFFSET: 180 MS
P ONSET: 115 MS
PH UR STRIP.AUTO: 7 [PH]
PLATELET # BLD AUTO: 15 X10*3/UL (ref 150–450)
PMV BLD AUTO: ABNORMAL FL
POTASSIUM SERPL-SCNC: 4.4 MMOL/L (ref 3.5–5.3)
PR INTERVAL: 200 MS
PRODUCT BLOOD TYPE: 5100
PRODUCT CODE: NORMAL
PROT SERPL-MCNC: 5.8 G/DL (ref 6.4–8.2)
PROT UR STRIP.AUTO-MCNC: ABNORMAL MG/DL
Q ONSET: 215 MS
QRS COUNT: 11 BEATS
QRS DURATION: 144 MS
QT INTERVAL: 420 MS
QTC CALCULATION(BAZETT): 443 MS
QTC FREDERICIA: 436 MS
R AXIS: 70 DEGREES
RBC # BLD AUTO: 2.38 X10*6/UL (ref 4.5–5.9)
RBC # UR STRIP.AUTO: ABNORMAL /UL
RBC #/AREA URNS AUTO: NORMAL /HPF
RH FACTOR (ANTIGEN D): NORMAL
RSV RNA RESP QL NAA+PROBE: NOT DETECTED
SARS-COV-2 RNA RESP QL NAA+PROBE: NOT DETECTED
SODIUM SERPL-SCNC: 137 MMOL/L (ref 136–145)
SP GR UR STRIP.AUTO: 1.01
SQUAMOUS #/AREA URNS AUTO: NORMAL /HPF
T AXIS: 21 DEGREES
T OFFSET: 425 MS
TACROLIMUS BLD-MCNC: 10 NG/ML
UNIT ABO: NORMAL
UNIT NUMBER: NORMAL
UNIT RH: NORMAL
UNIT VOLUME: 288
UROBILINOGEN UR STRIP.AUTO-MCNC: <2 MG/DL
VENTRICULAR RATE: 67 BPM
WBC # BLD AUTO: 1.3 X10*3/UL (ref 4.4–11.3)
WBC #/AREA URNS AUTO: NORMAL /HPF
XM INTEP: NORMAL

## 2023-10-19 PROCEDURE — 36415 COLL VENOUS BLD VENIPUNCTURE: CPT | Mod: CMCLAB | Performed by: EMERGENCY MEDICINE

## 2023-10-19 PROCEDURE — 2500000004 HC RX 250 GENERAL PHARMACY W/ HCPCS (ALT 636 FOR OP/ED): Mod: SE

## 2023-10-19 PROCEDURE — 86900 BLOOD TYPING SEROLOGIC ABO: CPT | Performed by: STUDENT IN AN ORGANIZED HEALTH CARE EDUCATION/TRAINING PROGRAM

## 2023-10-19 PROCEDURE — 85027 COMPLETE CBC AUTOMATED: CPT | Mod: CMCLAB | Performed by: STUDENT IN AN ORGANIZED HEALTH CARE EDUCATION/TRAINING PROGRAM

## 2023-10-19 PROCEDURE — 93005 ELECTROCARDIOGRAM TRACING: CPT

## 2023-10-19 PROCEDURE — 99285 EMERGENCY DEPT VISIT HI MDM: CPT | Performed by: EMERGENCY MEDICINE

## 2023-10-19 PROCEDURE — P9016 RBC LEUKOCYTES REDUCED: HCPCS

## 2023-10-19 PROCEDURE — 96372 THER/PROPH/DIAG INJ SC/IM: CPT

## 2023-10-19 PROCEDURE — 96361 HYDRATE IV INFUSION ADD-ON: CPT

## 2023-10-19 PROCEDURE — 83690 ASSAY OF LIPASE: CPT

## 2023-10-19 PROCEDURE — 2500000004 HC RX 250 GENERAL PHARMACY W/ HCPCS (ALT 636 FOR OP/ED)

## 2023-10-19 PROCEDURE — 36430 TRANSFUSION BLD/BLD COMPNT: CPT

## 2023-10-19 PROCEDURE — 87040 BLOOD CULTURE FOR BACTERIA: CPT | Mod: CMCLAB

## 2023-10-19 PROCEDURE — 71045 X-RAY EXAM CHEST 1 VIEW: CPT | Performed by: STUDENT IN AN ORGANIZED HEALTH CARE EDUCATION/TRAINING PROGRAM

## 2023-10-19 PROCEDURE — 96374 THER/PROPH/DIAG INJ IV PUSH: CPT

## 2023-10-19 PROCEDURE — 87637 SARSCOV2&INF A&B&RSV AMP PRB: CPT | Mod: CMCLAB

## 2023-10-19 PROCEDURE — 71275 CT ANGIOGRAPHY CHEST: CPT | Performed by: RADIOLOGY

## 2023-10-19 PROCEDURE — 2550000001 HC RX 255 CONTRASTS: Mod: SE | Performed by: STUDENT IN AN ORGANIZED HEALTH CARE EDUCATION/TRAINING PROGRAM

## 2023-10-19 PROCEDURE — 80053 COMPREHEN METABOLIC PANEL: CPT | Performed by: EMERGENCY MEDICINE

## 2023-10-19 PROCEDURE — 99285 EMERGENCY DEPT VISIT HI MDM: CPT | Mod: 25

## 2023-10-19 PROCEDURE — 2500000001 HC RX 250 WO HCPCS SELF ADMINISTERED DRUGS (ALT 637 FOR MEDICARE OP)

## 2023-10-19 PROCEDURE — 83880 ASSAY OF NATRIURETIC PEPTIDE: CPT | Mod: CMCLAB | Performed by: EMERGENCY MEDICINE

## 2023-10-19 PROCEDURE — 99223 1ST HOSP IP/OBS HIGH 75: CPT | Performed by: STUDENT IN AN ORGANIZED HEALTH CARE EDUCATION/TRAINING PROGRAM

## 2023-10-19 PROCEDURE — 81001 URINALYSIS AUTO W/SCOPE: CPT

## 2023-10-19 PROCEDURE — 71275 CT ANGIOGRAPHY CHEST: CPT

## 2023-10-19 PROCEDURE — 80197 ASSAY OF TACROLIMUS: CPT

## 2023-10-19 PROCEDURE — 2500000002 HC RX 250 W HCPCS SELF ADMINISTERED DRUGS (ALT 637 FOR MEDICARE OP, ALT 636 FOR OP/ED)

## 2023-10-19 PROCEDURE — 2500000004 HC RX 250 GENERAL PHARMACY W/ HCPCS (ALT 636 FOR OP/ED): Performed by: STUDENT IN AN ORGANIZED HEALTH CARE EDUCATION/TRAINING PROGRAM

## 2023-10-19 PROCEDURE — 86920 COMPATIBILITY TEST SPIN: CPT

## 2023-10-19 PROCEDURE — 84484 ASSAY OF TROPONIN QUANT: CPT | Mod: CMCLAB

## 2023-10-19 PROCEDURE — 83615 LACTATE (LD) (LDH) ENZYME: CPT | Mod: CMCLAB | Performed by: STUDENT IN AN ORGANIZED HEALTH CARE EDUCATION/TRAINING PROGRAM

## 2023-10-19 PROCEDURE — 36415 COLL VENOUS BLD VENIPUNCTURE: CPT | Mod: CMCLAB

## 2023-10-19 PROCEDURE — 84484 ASSAY OF TROPONIN QUANT: CPT | Mod: CMCLAB | Performed by: EMERGENCY MEDICINE

## 2023-10-19 PROCEDURE — 71045 X-RAY EXAM CHEST 1 VIEW: CPT

## 2023-10-19 PROCEDURE — 1100000001 HC PRIVATE ROOM DAILY

## 2023-10-19 PROCEDURE — 74174 CTA ABD&PLVS W/CONTRAST: CPT | Performed by: RADIOLOGY

## 2023-10-19 PROCEDURE — 85060 BLOOD SMEAR INTERPRETATION: CPT | Performed by: EMERGENCY MEDICINE

## 2023-10-19 RX ORDER — HYDROMORPHONE HYDROCHLORIDE 2 MG/1
1 TABLET ORAL EVERY 4 HOURS PRN
Status: DISCONTINUED | OUTPATIENT
Start: 2023-10-19 | End: 2023-10-20 | Stop reason: HOSPADM

## 2023-10-19 RX ORDER — DROPERIDOL 2.5 MG/ML
INJECTION, SOLUTION INTRAMUSCULAR; INTRAVENOUS
Status: COMPLETED
Start: 2023-10-19 | End: 2023-10-19

## 2023-10-19 RX ORDER — POLYETHYLENE GLYCOL 3350 17 G/17G
17 POWDER, FOR SOLUTION ORAL DAILY
Status: DISCONTINUED | OUTPATIENT
Start: 2023-10-19 | End: 2023-10-20 | Stop reason: HOSPADM

## 2023-10-19 RX ORDER — INSULIN GLARGINE 100 [IU]/ML
5 INJECTION, SOLUTION SUBCUTANEOUS NIGHTLY
Status: DISCONTINUED | OUTPATIENT
Start: 2023-10-19 | End: 2023-10-20 | Stop reason: HOSPADM

## 2023-10-19 RX ORDER — TACROLIMUS 1 MG/1
3 CAPSULE ORAL
Status: DISCONTINUED | OUTPATIENT
Start: 2023-10-19 | End: 2023-10-20 | Stop reason: HOSPADM

## 2023-10-19 RX ORDER — ACETAMINOPHEN 325 MG/1
975 TABLET ORAL ONCE
Status: DISCONTINUED | OUTPATIENT
Start: 2023-10-19 | End: 2023-10-19

## 2023-10-19 RX ORDER — ONDANSETRON 4 MG/1
4 TABLET, ORALLY DISINTEGRATING ORAL EVERY 8 HOURS PRN
Status: ON HOLD | COMMUNITY
End: 2024-01-05 | Stop reason: ALTCHOICE

## 2023-10-19 RX ORDER — METOCLOPRAMIDE HYDROCHLORIDE 5 MG/ML
10 INJECTION INTRAMUSCULAR; INTRAVENOUS ONCE
Status: DISCONTINUED | OUTPATIENT
Start: 2023-10-19 | End: 2023-10-19

## 2023-10-19 RX ORDER — LIDOCAINE 560 MG/1
1 PATCH PERCUTANEOUS; TOPICAL; TRANSDERMAL DAILY
Status: DISCONTINUED | OUTPATIENT
Start: 2023-10-19 | End: 2023-10-20 | Stop reason: HOSPADM

## 2023-10-19 RX ORDER — HYDROMORPHONE HYDROCHLORIDE 2 MG/1
2 TABLET ORAL EVERY 4 HOURS PRN
Status: DISCONTINUED | OUTPATIENT
Start: 2023-10-19 | End: 2023-10-19

## 2023-10-19 RX ORDER — PRAVASTATIN SODIUM 40 MG/1
40 TABLET ORAL NIGHTLY
Status: DISCONTINUED | OUTPATIENT
Start: 2023-10-19 | End: 2023-10-20 | Stop reason: HOSPADM

## 2023-10-19 RX ORDER — HYDROMORPHONE HYDROCHLORIDE 1 MG/ML
0.5 INJECTION, SOLUTION INTRAMUSCULAR; INTRAVENOUS; SUBCUTANEOUS ONCE
Status: COMPLETED | OUTPATIENT
Start: 2023-10-19 | End: 2023-10-19

## 2023-10-19 RX ORDER — SENNOSIDES 8.6 MG/1
1 TABLET ORAL NIGHTLY
Status: DISCONTINUED | OUTPATIENT
Start: 2023-10-19 | End: 2023-10-20 | Stop reason: HOSPADM

## 2023-10-19 RX ORDER — DEXTROSE MONOHYDRATE 100 MG/ML
0.3 INJECTION, SOLUTION INTRAVENOUS ONCE AS NEEDED
Status: DISCONTINUED | OUTPATIENT
Start: 2023-10-19 | End: 2023-10-20 | Stop reason: HOSPADM

## 2023-10-19 RX ORDER — PANTOPRAZOLE SODIUM 40 MG/1
40 TABLET, DELAYED RELEASE ORAL
Status: DISCONTINUED | OUTPATIENT
Start: 2023-10-20 | End: 2023-10-20 | Stop reason: HOSPADM

## 2023-10-19 RX ORDER — ACETAMINOPHEN 325 MG/1
975 TABLET ORAL 3 TIMES DAILY
Status: DISCONTINUED | OUTPATIENT
Start: 2023-10-19 | End: 2023-10-20 | Stop reason: HOSPADM

## 2023-10-19 RX ORDER — DROPERIDOL 2.5 MG/ML
1.25 INJECTION, SOLUTION INTRAMUSCULAR; INTRAVENOUS ONCE
Status: COMPLETED | OUTPATIENT
Start: 2023-10-19 | End: 2023-10-19

## 2023-10-19 RX ORDER — INSULIN LISPRO 100 [IU]/ML
0-5 INJECTION, SOLUTION INTRAVENOUS; SUBCUTANEOUS
Status: DISCONTINUED | OUTPATIENT
Start: 2023-10-19 | End: 2023-10-20 | Stop reason: HOSPADM

## 2023-10-19 RX ORDER — CARVEDILOL 25 MG/1
25 TABLET ORAL
Status: DISCONTINUED | OUTPATIENT
Start: 2023-10-19 | End: 2023-10-20 | Stop reason: HOSPADM

## 2023-10-19 RX ORDER — AMLODIPINE BESYLATE 10 MG/1
10 TABLET ORAL DAILY
Status: DISCONTINUED | OUTPATIENT
Start: 2023-10-19 | End: 2023-10-20 | Stop reason: HOSPADM

## 2023-10-19 RX ORDER — DEXTROSE 50 % IN WATER (D50W) INTRAVENOUS SYRINGE
25
Status: DISCONTINUED | OUTPATIENT
Start: 2023-10-19 | End: 2023-10-20 | Stop reason: HOSPADM

## 2023-10-19 RX ORDER — PREDNISONE 5 MG/1
5 TABLET ORAL DAILY
Status: DISCONTINUED | OUTPATIENT
Start: 2023-10-19 | End: 2023-10-20 | Stop reason: HOSPADM

## 2023-10-19 RX ADMIN — DROPERIDOL 1.25 MG: 2.5 INJECTION, SOLUTION INTRAMUSCULAR; INTRAVENOUS at 11:25

## 2023-10-19 RX ADMIN — TACROLIMUS 3 MG: 1 CAPSULE ORAL at 11:04

## 2023-10-19 RX ADMIN — SODIUM CHLORIDE, POTASSIUM CHLORIDE, SODIUM LACTATE AND CALCIUM CHLORIDE 1000 ML: 600; 310; 30; 20 INJECTION, SOLUTION INTRAVENOUS at 04:04

## 2023-10-19 RX ADMIN — TACROLIMUS 3 MG: 1 CAPSULE ORAL at 22:55

## 2023-10-19 RX ADMIN — PRAVASTATIN SODIUM 40 MG: 40 TABLET ORAL at 22:56

## 2023-10-19 RX ADMIN — PREDNISONE 5 MG: 5 TABLET ORAL at 11:05

## 2023-10-19 RX ADMIN — DROPERIDOL 1.25 MG: 2.5 INJECTION, SOLUTION INTRAMUSCULAR; INTRAVENOUS at 04:04

## 2023-10-19 RX ADMIN — ACETAMINOPHEN 975 MG: 325 TABLET ORAL at 22:55

## 2023-10-19 RX ADMIN — IOHEXOL 90 ML: 350 INJECTION, SOLUTION INTRAVENOUS at 09:10

## 2023-10-19 RX ADMIN — HYDROMORPHONE HYDROCHLORIDE 0.5 MG: 1 INJECTION, SOLUTION INTRAMUSCULAR; INTRAVENOUS; SUBCUTANEOUS at 13:14

## 2023-10-19 RX ADMIN — INSULIN GLARGINE 5 UNITS: 100 INJECTION, SOLUTION SUBCUTANEOUS at 22:56

## 2023-10-19 SDOH — SOCIAL STABILITY: SOCIAL INSECURITY: DO YOU FEEL UNSAFE GOING BACK TO THE PLACE WHERE YOU ARE LIVING?: NO

## 2023-10-19 SDOH — SOCIAL STABILITY: SOCIAL INSECURITY: HAS ANYONE EVER THREATENED TO HURT YOUR FAMILY OR YOUR PETS?: NO

## 2023-10-19 SDOH — SOCIAL STABILITY: SOCIAL INSECURITY: WERE YOU ABLE TO COMPLETE ALL THE BEHAVIORAL HEALTH SCREENINGS?: YES

## 2023-10-19 SDOH — SOCIAL STABILITY: SOCIAL INSECURITY: DO YOU FEEL ANYONE HAS EXPLOITED OR TAKEN ADVANTAGE OF YOU FINANCIALLY OR OF YOUR PERSONAL PROPERTY?: NO

## 2023-10-19 SDOH — SOCIAL STABILITY: SOCIAL INSECURITY: HAVE YOU HAD THOUGHTS OF HARMING ANYONE ELSE?: NO

## 2023-10-19 SDOH — SOCIAL STABILITY: SOCIAL INSECURITY: ABUSE: ADULT

## 2023-10-19 SDOH — SOCIAL STABILITY: SOCIAL INSECURITY: ARE YOU OR HAVE YOU BEEN THREATENED OR ABUSED PHYSICALLY, EMOTIONALLY, OR SEXUALLY BY ANYONE?: NO

## 2023-10-19 SDOH — SOCIAL STABILITY: SOCIAL INSECURITY: DOES ANYONE TRY TO KEEP YOU FROM HAVING/CONTACTING OTHER FRIENDS OR DOING THINGS OUTSIDE YOUR HOME?: NO

## 2023-10-19 SDOH — SOCIAL STABILITY: SOCIAL INSECURITY: ARE THERE ANY APPARENT SIGNS OF INJURIES/BEHAVIORS THAT COULD BE RELATED TO ABUSE/NEGLECT?: NO

## 2023-10-19 ASSESSMENT — LIFESTYLE VARIABLES
SKIP TO QUESTIONS 9-10: 1
AUDIT-C TOTAL SCORE: 0
HAVE PEOPLE ANNOYED YOU BY CRITICIZING YOUR DRINKING: NO
HOW MANY STANDARD DRINKS CONTAINING ALCOHOL DO YOU HAVE ON A TYPICAL DAY: PATIENT DOES NOT DRINK
AUDIT-C TOTAL SCORE: 0
EVER FELT BAD OR GUILTY ABOUT YOUR DRINKING: NO
REASON UNABLE TO ASSESS: NO
EVER HAD A DRINK FIRST THING IN THE MORNING TO STEADY YOUR NERVES TO GET RID OF A HANGOVER: NO
HOW OFTEN DO YOU HAVE A DRINK CONTAINING ALCOHOL: NEVER
HOW OFTEN DO YOU HAVE 6 OR MORE DRINKS ON ONE OCCASION: NEVER
HAVE YOU EVER FELT YOU SHOULD CUT DOWN ON YOUR DRINKING: NO

## 2023-10-19 ASSESSMENT — PAIN SCALES - GENERAL
PAINLEVEL_OUTOF10: 10 - WORST POSSIBLE PAIN
PAINLEVEL_OUTOF10: 10 - WORST POSSIBLE PAIN
PAINLEVEL_OUTOF10: 7
PAINLEVEL_OUTOF10: 0 - NO PAIN
PAINLEVEL_OUTOF10: 7

## 2023-10-19 ASSESSMENT — COGNITIVE AND FUNCTIONAL STATUS - GENERAL
PATIENT BASELINE BEDBOUND: YES
DAILY ACTIVITIY SCORE: 24
MOBILITY SCORE: 24

## 2023-10-19 ASSESSMENT — ACTIVITIES OF DAILY LIVING (ADL)
WALKS IN HOME: INDEPENDENT
FEEDING YOURSELF: INDEPENDENT
JUDGMENT_ADEQUATE_SAFELY_COMPLETE_DAILY_ACTIVITIES: YES
DRESSING YOURSELF: INDEPENDENT
GROOMING: INDEPENDENT
LACK_OF_TRANSPORTATION: NO
HEARING - RIGHT EAR: FUNCTIONAL
ADEQUATE_TO_COMPLETE_ADL: YES
HEARING - LEFT EAR: FUNCTIONAL
BATHING: INDEPENDENT
PATIENT'S MEMORY ADEQUATE TO SAFELY COMPLETE DAILY ACTIVITIES?: YES
TOILETING: INDEPENDENT

## 2023-10-19 ASSESSMENT — PAIN - FUNCTIONAL ASSESSMENT
PAIN_FUNCTIONAL_ASSESSMENT: 0-10

## 2023-10-19 ASSESSMENT — COLUMBIA-SUICIDE SEVERITY RATING SCALE - C-SSRS
2. HAVE YOU ACTUALLY HAD ANY THOUGHTS OF KILLING YOURSELF?: NO
6. HAVE YOU EVER DONE ANYTHING, STARTED TO DO ANYTHING, OR PREPARED TO DO ANYTHING TO END YOUR LIFE?: NO
1. IN THE PAST MONTH, HAVE YOU WISHED YOU WERE DEAD OR WISHED YOU COULD GO TO SLEEP AND NOT WAKE UP?: NO

## 2023-10-19 ASSESSMENT — PATIENT HEALTH QUESTIONNAIRE - PHQ9
1. LITTLE INTEREST OR PLEASURE IN DOING THINGS: NOT AT ALL
2. FEELING DOWN, DEPRESSED OR HOPELESS: NOT AT ALL
SUM OF ALL RESPONSES TO PHQ9 QUESTIONS 1 & 2: 0

## 2023-10-19 ASSESSMENT — PAIN DESCRIPTION - DESCRIPTORS: DESCRIPTORS: ACHING

## 2023-10-19 NOTE — PROGRESS NOTES
.Patient has been identified as having an emergent need for administration of iodinated contrast for CT scan prior to result of laboratory studies OR despite known elevated GFR due to possibility of life and/or limb threatening pathology.    I acknowledge the risks and benefits of emergently proceeding with contrast administration including that, at present, it is the position of the [employer] that contrast induced nephropathy (ALFA) is a rare but possible consequence. At this time the benefits of proceeding with contrast administration outweigh the risks.    Attempts will be made to mitigate possible ALFA risk with IV fluid hydration if able.    Brielle Orozco, DO  Emergency Medicine PGY-2

## 2023-10-19 NOTE — PROGRESS NOTES
Patient was handed off to me from the previous team. For full history, physical, and prior ED course, please see previous provider note prior to patient handoff. This is an addendum to the record.    Briefly, this is a 68-year-old male who presents to the emergency department for recurrent abdominal pain who presents to the emergency department with concern for worsening abdominal pain.  Found to have significant worsening pancytopenia of unknown etiology, hemoglobin 6.6 and platelets 15.  Was being evaluated for pancytopenia during his most recent hospitalization, actually saw hematology a few days ago as outpatient had lab work at that time.  Lab work today does demonstrate a new significant drop in hemoglobin and platelets from yesterday.  Pending CTA and blood transfusion for admission at time of signout.    Hospital Course/MDM:  Patient continues to have pain on reevaluation, ordered IV Dilaudid.  CT without acute surgical abnormality, concerning for possible enterocolitis.  Patient consented for and transfused 1 unit PRBC without complication.  Unclear etiology for pancytopenia may be related to bone marrow suppression, possibly from viral illness.  Bilirubin is normal, not consistent with hemolysis or consumptive process.  Discussed with admissions coordinator, admitted to medicine for further evaluation and management of abdominal pain and pancytopenia of unclear etiology.    Disposition:  Admit to medicine    --  Cari Pickens MD  Emergency Medicine, PGY-3

## 2023-10-19 NOTE — ED PROVIDER NOTES
HPI   Chief Complaint   Patient presents with    Generalized Body Aches       Patient is a 68-year-old male with history of renal transplant and 2 to 3 months of chronic abdominal pain presenting for evaluation of worsening abdominal pain.  Denies any true fevers though does state that he is having some chills.  He is not tachycardic has not had any syncopal events.  He denies any emesis any bleeding from rectum with dark tarry stools or hematochezia or hematemesis.  He is making urine no dysuria.  No pain or frequency with urination.  Abdominal pain is diffuse.  He states it is slightly worse in his left lower quadrant.  He just had his gallbladder taken out a few weeks ago with hopes that this would be curative of his abdominal pain however it has continued.  He describes it as deep in his bones.  He does endorse weight loss as well. No trauma                          S[place]ring Coma Scale Score: 15                  Patient History   Past Medical History:   Diagnosis Date    Chronic kidney disease, stage 3 unspecified (CMS/HCC) 09/26/2018    Stage 3 chronic kidney disease    COVID-19 06/18/2020    COVID-19 virus infection    Diabetes (CMS/[facility])     HTN (hypertension)     Other long term (current) drug therapy 07/20/2021    High risk medication use    Personal history of other diseases of the circulatory system     Personal history of cardiac murmur    Personal history of other infectious and parasitic diseases 08/17/2015    History of hepatitis    Polyp, colonic 08/17/2023    Primary osteoarthritis of both ankles 08/17/2023    Tubular adenoma of colon 08/17/2023    Unspecified kidney failure 08/17/2016    Renal failure     Past Surgical History:   Procedure Laterality Date    ILEOSTOMY  04/25/2017    Ileostomy    ILEOSTOMY CLOSURE  08/17/2015    Ileostomy Closure    OTHER SURGICAL HISTORY  04/21/2017    Right Hemicolectomy    OTHER SURGICAL HISTORY  08/17/2015    Arteriovenous Surgery Creation Of A-V Fistula    OTHER  SURGICAL HISTORY  08/17/2015    Sigmoidoscopy (Fiberoptic, Therapeutic )    PROSTATECTOMY  10/11/2013    Prostatectomy Radical    TRANSPLANT, KIDNEY, OPEN  1992    TRANSPLANT, KIDNEY, OPEN  2013    US GUIDED PERCUTANEOUS PERITONEAL OR RETROPERITONEAL FLUID COLLECTION DRAINAGE  10/20/2022    US GUIDED PERCUTANEOUS PERITONEAL OR RETROPERITONEAL FLUID COLLECTION DRAINAGE 10/20/2022 [facility] CLINICAL LEGACY     Family History   Problem Relation Name Age of Onset    Bone cancer Mother      Other (corona's sarcome of the bone marrow) Mother      Prostate cancer Father      Diabetes Other Family Hist     Hypertension Other Family Hist      Social History     Tobacco Use    Smoking status: Never    Smokeless tobacco: Never   Vaping Use    Vaping Use: Never used   Substance Use Topics    Alcohol use: Yes    Drug use: Yes     Types: Oxycodone       Physical Exam   ED Triage Vitals [10/19/23 0249]   Temp Heart Rate Resp BP   37.4 °C (99.3 °F) 67 16 170/81      SpO2 Temp Source Heart Rate Source Patient Position   98 % Temporal Monitor --      BP Location FiO2 (%)     Right arm --       Physical Exam  Vitals and nursing note reviewed.   Constitutional:       General: He is not in acute distress.     Appearance: He is well-developed.   HENT:      Head: Normocephalic and atraumatic.   Eyes:      Conjunctiva/sclera: Conjunctivae normal.   Cardiovascular:      Rate and Rhythm: Normal rate and regular rhythm.      Heart sounds: No murmur heard.  Pulmonary:      Effort: Pulmonary effort is normal. No respiratory distress.      Breath sounds: Normal breath sounds.   Abdominal:      Palpations: Abdomen is soft.      Tenderness: There is no abdominal tenderness.   Musculoskeletal:         General: No swelling.      Cervical back: Neck supple.   Skin:     General: Skin is warm and dry.      Capillary Refill: Capillary refill takes less than 2 seconds.   Neurological:      Mental Status: He is alert.   Psychiatric:         Mood and Affect:  Mood normal.         ED Course & MDM   Diagnoses as of 10/25/23 1254   Pancytopenia (CMS/HCC)   Generalized abdominal pain     Labs Reviewed   CBC WITH AUTO DIFFERENTIAL - Abnormal       Result Value    WBC 1.3 (*)     nRBC 0.0      RBC 2.38 (*)     Hemoglobin 6.6 (*)     Hematocrit 18.4 (*)     MCV 77 (*)     MCH 27.7      MCHC 35.9      RDW 15.8 (*)     Platelets 15 (*)     MPV        Neutrophils %        Immature Granulocytes %, Automated 0.0      Lymphocytes %        Monocytes %        Eosinophils %        Basophils %        Neutrophils Absolute        Immature Granulocytes Absolute, Automated 0.00      Lymphocytes Absolute        Monocytes Absolute        Eosinophils Absolute        Basophils Absolute       COMPREHENSIVE METABOLIC PANEL - Abnormal    Glucose 132 (*)     Sodium 137      Potassium 4.4      Chloride 109 (*)     Bicarbonate 23      Anion Gap 9 (*)     Urea Nitrogen 28 (*)     Creatinine 2.74 (*)     eGFR 25 (*)     Calcium 10.0      Albumin 3.1 (*)     Alkaline Phosphatase 61      Total Protein 5.8 (*)     AST 19      Bilirubin, Total 0.5      ALT 6 (*)    B-TYPE NATRIURETIC PEPTIDE - Abnormal     (*)     Narrative:        <100 pg/mL - Heart failure unlikely  100-299 pg/mL - Intermediate probability of acute heart                  failure exacerbation. Correlate with clinical                  context and patient history.    >=300 pg/mL - Heart Failure likely. Correlate with clinical                  context and patient history.     Biotin interference may cause falsely decreased results. Patients taking a Biotin dose of up to 5 mg/day should refrain from taking Biotin for 24 hours before sample  collection. Providers may contact their local laboratory for further information.   LIPASE - Abnormal    Lipase 7 (*)     Narrative:     Venipuncture immediately after or during the administration of Metamizole may lead to falsely low results. Testing should be performed immediately prior to  Metamizole dosing.   TROPONIN I, HIGH SENSITIVITY - Normal    Troponin I, High Sensitivity 53      Narrative:     Less than 99th percentile of normal range cutoff-  Female and children under 18 years old <35 ng/L; Male <54 ng/L: Negative  Repeat testing should be performed if clinically indicated.     Female and children under 18 years old  ng/L; Male  ng/L:  Consistent with possible cardiac damage and possible increased clinical   risk. Serial measurements may help to assess extent of myocardial damage.     >120 ng/L: Consistent with cardiac damage, increased clinical risk and  myocardial infarction. Serial measurements may help assess extent of   myocardial damage.      NOTE: Children less than 1 year old may have higher baseline troponin   levels and results should be interpreted in conjunction with the overall   clinical context.    NOTE: Troponin I testing is performed using a different   testing methodology at [facility] than at [facility]. Direct result comparisons should only   be made within the same method.     SARS-COV-2 PCR, SYMPTOMATIC - Normal    Coronavirus 2019, PCR Not Detected      Narrative:     This assay has received FDA Emergency Use Authorization (EUA) and is only authorized for the duration of time that circumstances exist to justify the authorization of the emergency use of in vitro diagnostic tests for the detection of SARS-CoV-2 virus and/or diagnosis of COVID-19 infection under section 564(b)(1) of the Act, 21 U.S.C. 360bbb-3(b)(1). This assay is an in vitro diagnostic nucleic acid amplification test for the qualitative detection of SARS-CoV-2 from nasopharyngeal specimens and has been validated for use at [facility]. Negative results do not preclude COVID-19 infections and should not be used as the sole basis for diagnosis, treatment, or other management decisions.     INFLUENZA A AND B PCR - Normal    Flu A Result Not  Detected      Flu B Result Not Detected      Narrative:     This assay is an in vitro diagnostic multiplex nucleic acid amplification test for the detection and discrimination of Influenza A & B from nasopharyngeal specimens, and has been validated for use at [facility]. Negative results do not preclude Influenza A/B infections, and should not be used as the sole basis for diagnosis, treatment, or other management decisions. If Influenza A/B and RSV PCR results are negative, testing for Parainfluenza virus, Adenovirus and Metapneumovirus is routinely performed for [facility] [place] and intensive care inpatients, and is available on other patients by placing an add-on request.   RSV PCR - Normal    RSV PCR Not Detected      Narrative:     This assay is an FDA-cleared, in vitro diagnostic nucleic acid amplification test for the detection of RSV from nasopharyngeal specimens, and has been validated for use at [facility]. Negative results do not preclude RSV infections, and should not be used as the sole basis for diagnosis, treatment, or other management decisions. If Influenza A/B and RSV PCR results are negative, testing for Parainfluenza virus, Adenovirus and Metapneumovirus is routinely performed for pediatric oncology and intensive care inpatients at [facility], and is available on other patients by placing an add-on request.       BLOOD CULTURE   BLOOD CULTURE   URINALYSIS WITH REFLEX MICROSCOPIC AND CULTURE    Narrative:     The following orders were created for panel order Urinalysis with Reflex Microscopic and Culture.  Procedure                               Abnormality         Status                     ---------                               -----------         ------                     Urinalysis with Reflex M...[[id number]]                                                 Extra Urine Gray Tube[[id number]]                                                          Please view results for these tests on the individual orders.   URINALYSIS WITH REFLEX MICROSCOPIC AND CULTURE   EXTRA URINE GRAY TUBE   TACROLIMUS   BLOOD GAS VENOUS FULL PANEL   PREPARE RBC       XR chest 1 view   Final Result   1.  Interval resolution previous pulmonary edema and near complete   resolution of now trace pleural effusions.   2. Cardiomegaly.        I personally reviewed the images/study and I agree with the findings   as stated. This study was interpreted at [facility], [place], [place].        MACRO:   NONE.        Signed by: Cipriano Wright 10/19/2023 3:35 AM   Dictation workstation:   [id number]      CT angio chest abdomen pelvis    (Results Pending)       Medical Decision Making  Differential includes malignancy/lymphodysplastic syndrome w pancytopenia worsening with acute anemia here in addition to leukopenia and thrombocytopenia.  He does endorse weight loss ongoing pain.  His iCal is elevated however his serum calcium is 10.  He also could have ongoing bone pain related to his chronic kidney disease with chronic changes leading to his ongoing pain.  Given his tender abdomen abdomen I do have concern for bowel ischemia.  CT angio ordered for this patient, he has only received Noncon CTs in his recent work-up of this new abdominal pain and contrast needed for rule out.  Patient was given liter of fluids.  Remains hemodynamically stable.  His antirejection medicines were ordered.  COVID flu RSV was also ordered the lower concern for infectious etiology.  He has been seen outpatient by heme-onc who had requested a bone biopsy in the next upcoming weeks.        Procedure  Procedures    I saw and evaluated the patient. I personally obtained the key and critical portions of the history and physical exam or was physically present for key and critical portions performed by the resident/fellow. I reviewed the resident/fellow's documentation and discussed the  patient with the resident/fellow. I agree with the resident/fellow's medical decision making as documented in the note.    Patient with renal transplant and abdominal pain  Given panctopenia and malignancy patient is at risk for bowel ischemia so will get CT with contrast to evaluate and check lactate  Fluids, analgesia    Signed out awaiting results of imaging for dispo decision     MD Di Guillen MD  10/25/23 9230       Di Pepper MD  11/08/23 8212

## 2023-10-19 NOTE — PROGRESS NOTES
"Pharmacy Medication History Review    Manolo Bashir is a 68 y.o. male admitted for Pancytopenia (CMS/[facility]). Pharmacy reviewed the patient's [id number] medications and allergies for accuracy.    Prior to Admission Medications   Prescriptions Last Dose Informant Patient Reported? Taking?   FeroSuL 325 mg (65 mg iron) tablet 10/18/2023  Yes No   Sig: Take 1 tablet (65 mg of iron) by mouth 2 times a day.   Vitamin D3 25 mcg (1,000 unit) capsule 10/18/2023  Yes No   Sig: Take 2 capsules (50 mcg) by mouth once daily.   acetaminophen (Tylenol) 325 mg tablet 10/18/2023  No No   Sig: TAKE 2 TABLETS BY MOUTH EVERY 6 HOURS AS NEEDED FOR MILD TO MODERATE PAIN   amLODIPine (Norvasc) 10 mg tablet 10/18/2023  Yes No   Sig: Take 1 tablet (10 mg) by mouth once daily.   azaTHIOprine (Imuran) 50 mg tablet 10/18/2023  Yes No   Sig: Take 1 tablet (50 mg) by mouth once daily.   carvedilol (Coreg) 25 mg tablet 10/18/2023  Yes No   Sig: Take 1 tablet (25 mg) by mouth 2 times a day.   docusate sodium (Colace) 100 mg capsule 10/18/2023  No No   Sig: TAKE 1 CAPSULE BY MOUTH TWO TIMES A DAY TO PREVENT CONSTIPATION   gabapentin (Neurontin) 300 mg capsule 10/18/2023  No No   Sig: Take 1 capsule (300 mg) by mouth 2 times a day.   hydrALAZINE (Apresoline) 25 mg tablet 10/18/2023  Yes No   Sig: Take 1 tablet (25 mg) by mouth 3 times a day. \"Only if blood pressure is above 150/90\"   insulin glargine (Lantus) 100 unit/mL injection 10/18/2023  No No   Sig: Inject 10 Units under the skin once daily. Take as directed per insulin instructions.   Patient taking differently: Inject 15 Units under the skin once daily in the morning. Take as directed per insulin instructions.   insulin lispro (HumaLOG) 100 unit/mL injection 10/18/2023  Yes No   Sig: INJECT UP TO 5 UNITS SUBCUTANEOUSLY THREE TIMES A DAY WITH MEALS PER SLIDING SCALE AS DIRECTED   losartan (Cozaar) 25 mg tablet 10/18/2023  Yes No   Sig: Take 1 tablet (25 mg) by mouth once daily. "   ondansetron ODT (Zofran-ODT) 4 mg disintegrating tablet Past Week  Yes No   Sig: Take 1 tablet (4 mg) by mouth every 8 hours if needed for nausea or vomiting.   pravastatin (Pravachol) 40 mg tablet 10/18/2023  Yes No   Sig: Take 1 tablet (40 mg) by mouth once daily at bedtime.   predniSONE (Deltasone) 5 mg tablet 10/18/2023  Yes No   Sig: Take 1 tablet (5 mg) by mouth once daily in the morning.   tacrolimus (Prograf) 1 mg capsule 10/18/2023  Yes No   Sig: Take 3 capsule AM 3 Capsule PM   tacrolimus (Protopic) 0.1 % ointment Not started yet  No No   Sig: Apply topically 2 times a day.   traMADol (Ultram) 50 mg tablet 10/18/2023  No No   Sig: Take 1 tablet (50 mg) by mouth every 6 hours if needed for severe pain (7 - 10) for up to 10 days.      Facility-Administered Medications: None            The list below reflectives the updated allergy list. Please review each documented allergy for additional clarification and justification.  Allergies  Reviewed by Jerrell Parker [facility] on 10/19/2023   No Known Allergies       Sources: Pt interview at bedside (meds unknown by pt), Phone interview with  (Cecelia [phone]), [place], dispense report, Discharge summary 9/20/23, H&P 10/3/23.     Additional Comments: Tacro dose changes often, some notes say tacro 2/2 but he was taking tacro 3/3 PTA.     Jerrell Parker, PharmSAMANTHA  Transitions of Care Pharmacist  Reach out via [employer] for questions, or if no response call [employer] or [employer]

## 2023-10-19 NOTE — H&P
"Chief Complaint: \"generalized abdominal pain\"  History was gathered from the patient and his  at bedside.   History Of Present Illness  Manolo Bashir is a 68 y.o. male with a relevant PMH of ESRD s/p 2 renal transplants (1992, 2013, on prednisone, tacro, azathioprine), CKD3, angina (last EF 60-65% 2/201), DM2 on insulin (last A1c 8.4% 7/2023), HTN, prostate cancer s/p radical prostectomy, HCV (RNA not detected 10/18/23) presenting with generalized abdominal pain. Notes abdominal pain that began around the beginning of this year but has worsened over the past 2-3 months. He describes the pain as around the middle of his abdomen, extending to his back. The pain is constant and unrelieved/exacerbated by eating or positional changes. He states that the pain does not radiate. The pain is sometimes accompanied by nausea and vomiting. His  at bedside notes that he has lost weight, attributes it to not being able to keep food down due to n/v.   Patient had cholecystectomy on Sept. 24 (postop path showed chronic cholecystitis), but reports no improvement in pain.   Of note, the patient has been hospitalized 2x recently for similar symptoms, without clear etiology determined (last discharged 10/13). (Last hospital course significant for no clear etiology of pain determined, bladder US showing 60cc PVR, UA negative for infxn.)  Endorses occasional chills, chest pain. Denies dysuria, changes to his bowel movements, fevers, bleeding/bruising.   Past heme/onc workup per OV note 10/17:   \"He seems to struggle some with dates, but except for anemia for several years treated with iron supplements, does not recall being told about abnormal blood counts until last few months.  He has not required transfusion support and denies any recent infections.  Upon reviewing his blood counts, he had worsening anemia (10-11 down to 7.4 g/dL) and platelets (200s down to 69K) over last 2-3 months\"  Previous SPEP from 2022: " "  \"Monoclonal IgA lambda in the beta region at 0.1 g/dL and   a vague monoclonal IgG lambda in the gamma region at 0.1 g/dL.   Suggest further evaluation for the diagnosis of plasma cell dyscrasia. \"      Home Medications:   Prior to Admission Medications   Prescriptions Last Dose Informant Patient Reported? Taking?   FeroSuL 325 mg (65 mg iron) tablet 10/18/2023   Yes No   Sig: Take 1 tablet (65 mg of iron) by mouth 2 times a day.   Vitamin D3 25 mcg (1,000 unit) capsule 10/18/2023   Yes No   Sig: Take 2 capsules (50 mcg) by mouth once daily.   acetaminophen (Tylenol) 325 mg tablet 10/18/2023   No No   Sig: TAKE 2 TABLETS BY MOUTH EVERY 6 HOURS AS NEEDED FOR MILD TO MODERATE PAIN   amLODIPine (Norvasc) 10 mg tablet 10/18/2023   Yes No   Sig: Take 1 tablet (10 mg) by mouth once daily.   azaTHIOprine (Imuran) 50 mg tablet 10/18/2023   Yes No   Sig: Take 1 tablet (50 mg) by mouth once daily.   carvedilol (Coreg) 25 mg tablet 10/18/2023   Yes No   Sig: Take 1 tablet (25 mg) by mouth 2 times a day.   docusate sodium (Colace) 100 mg capsule 10/18/2023   No No   Sig: TAKE 1 CAPSULE BY MOUTH TWO TIMES A DAY TO PREVENT CONSTIPATION   gabapentin (Neurontin) 300 mg capsule 10/18/2023   No No   Sig: Take 1 capsule (300 mg) by mouth 2 times a day.   hydrALAZINE (Apresoline) 25 mg tablet 10/18/2023   Yes No   Sig: Take 1 tablet (25 mg) by mouth 3 times a day. \"Only if blood pressure is above 150/90\"   insulin glargine (Lantus) 100 unit/mL injection 10/18/2023   No No   Sig: Inject 10 Units under the skin once daily. Take as directed per insulin instructions.   Patient taking differently: Inject 15 Units under the skin once daily in the morning. Take as directed per insulin instructions.   insulin lispro (HumaLOG) 100 unit/mL injection 10/18/2023   Yes No   Sig: INJECT UP TO 5 UNITS SUBCUTANEOUSLY THREE TIMES A DAY WITH MEALS PER SLIDING SCALE AS DIRECTED   losartan (Cozaar) 25 mg tablet 10/18/2023   Yes No   Sig: Take 1 tablet " (25 mg) by mouth once daily.   ondansetron ODT (Zofran-ODT) 4 mg disintegrating tablet Past Week   Yes No   Sig: Take 1 tablet (4 mg) by mouth every 8 hours if needed for nausea or vomiting.   pravastatin (Pravachol) 40 mg tablet 10/18/2023   Yes No   Sig: Take 1 tablet (40 mg) by mouth once daily at bedtime.   predniSONE (Deltasone) 5 mg tablet 10/18/2023   Yes No   Sig: Take 1 tablet (5 mg) by mouth once daily in the morning.   tacrolimus (Prograf) 1 mg capsule 10/18/2023   Yes No   Sig: Take 3 capsule AM 3 Capsule PM   tacrolimus (Protopic) 0.1 % ointment Not started yet   No No   Sig: Apply topically 2 times a day.   traMADol (Ultram) 50 mg tablet 10/18/2023   No No   Sig: Take 1 tablet (50 mg) by mouth every 6 hours if needed for severe pain (7 - 10) for up to 10 days.      Facility-Administered Medications: None       Past Medical History  Past Medical History:   Diagnosis Date    Chronic kidney disease, stage 3 unspecified (CMS/HCC) 09/26/2018    Stage 3 chronic kidney disease    COVID-19 06/18/2020    COVID-19 virus infection    Diabetes (CMS/HCC)     HTN (hypertension)     Other long term (current) drug therapy 07/20/2021    High risk medication use    Personal history of other diseases of the circulatory system     Personal history of cardiac murmur    Personal history of other infectious and parasitic diseases 08/17/2015    History of hepatitis    Polyp, colonic 08/17/2023    Primary osteoarthritis of both ankles 08/17/2023    Tubular adenoma of colon 08/17/2023    Unspecified kidney failure 08/17/2016    Renal failure   Chronic, treated hepatitis C (CMS/HCC)     Surgical History  Past Surgical History:   Procedure Laterality Date    ILEOSTOMY  04/25/2017    Ileostomy    ILEOSTOMY CLOSURE  08/17/2015    Ileostomy Closure    OTHER SURGICAL HISTORY  04/21/2017    Right Hemicolectomy    OTHER SURGICAL HISTORY  08/17/2015    Arteriovenous Surgery Creation Of A-V Fistula    OTHER SURGICAL HISTORY  08/17/2015     Sigmoidoscopy (Fiberoptic, Therapeutic )    PROSTATECTOMY  10/11/2013    Prostatectomy Radical    TRANSPLANT, KIDNEY, OPEN  1992    TRANSPLANT, KIDNEY, OPEN  2013    US GUIDED PERCUTANEOUS PERITONEAL OR RETROPERITONEAL FLUID COLLECTION DRAINAGE  10/20/2022    US GUIDED PERCUTANEOUS PERITONEAL OR RETROPERITONEAL FLUID COLLECTION DRAINAGE 10/20/2022 [facility]ICAL [facility]GAC[facility]        Social History  He reports that he has never smoked. He has never used smokeless tobacco. He reports current alcohol use. He reports current drug use. Drug: Oxycodone.  - lives with his daughter  - occupation: on disability  - never smoked  - no alcohol use  - no other illicit drug use    Family History  Family History   Problem Relation Name Age of Onset    Bone cancer Mother      Other (corona's sarcome of the bone marrow) Mother      Prostate cancer Father      Diabetes Other Family Hist     Hypertension Other Family Hist        Allergies  Patient has no known allergies.    ED Course:   Presented to the ED 2:40am 10/19  - CXR  - CMP, lipase, BNP, blood culture, tacro level, troponin, CBC  - venous blood gas  - 1000 ml LR  - Respiratory viral panel  - ECG  - CT angio CAP  - Urinalysis  - droperidol 1.25  - type and screen, RBC transfusion  - given 0.5 mg hydromorphone    Review of Systems  All pertinent positives and negatives noted on HPI.      Physical Exam  Constitutional:       General: He is not in acute distress.     Appearance: Normal appearance.   Cardiovascular:      Rate and Rhythm: Normal rate and regular rhythm.      Heart sounds: Normal heart sounds.   Pulmonary:      Effort: Pulmonary effort is normal.      Breath sounds: Normal breath sounds.   Abdominal:      Tenderness: There is abdominal tenderness.      Comments: Diffuse tenderness to palpation.   Notes tenderness when palpating RUQ.   Slight hepatomegaly appreciated.   No splenomegaly appreciated.   No abdominal bruit appreciated.   No distension or guarding noted, no  "rebound.   No CVA tenderness noted.    Musculoskeletal:      Comments: Diffuse spinal tenderness noted.   No paraspinal tenderness noted.    Skin:     General: Skin is warm and dry.      Findings: No bruising.        Last Recorded Vitals  Blood pressure (!) 189/98, pulse 76, temperature 36.6 °C (97.9 °F), temperature source Temporal, resp. rate 16, height 1.727 m (5' 8\"), weight 68 kg (150 lb), SpO2 100 %.    Relevant Results      Results for orders placed or performed during the hospital encounter of 10/19/23 (from the past 24 hour(s))   CBC with Differential   Result Value Ref Range    WBC 1.3 (L) 4.4 - 11.3 x10*3/uL    nRBC 0.0 0.0 - 0.0 /100 WBCs    RBC 2.38 (L) 4.50 - 5.90 x10*6/uL    Hemoglobin 6.6 (L) 13.5 - 17.5 g/dL    Hematocrit 18.4 (L) 41.0 - 52.0 %    MCV 77 (L) 80 - 100 fL    MCH 27.7 26.0 - 34.0 pg    MCHC 35.9 32.0 - 36.0 g/dL    RDW 15.8 (H) 11.5 - 14.5 %    Platelets 15 (LL) 150 - 450 x10*3/uL    MPV      Neutrophils %      Immature Granulocytes %, Automated 0.0 0.0 - 0.9 %    Lymphocytes %      Monocytes %      Eosinophils %      Basophils %      Neutrophils Absolute      Immature Granulocytes Absolute, Automated 0.00 0.00 - 0.70 x10*3/uL    Lymphocytes Absolute      Monocytes Absolute      Eosinophils Absolute      Basophils Absolute     Comprehensive Metabolic Panel   Result Value Ref Range    Glucose 132 (H) 74 - 99 mg/dL    Sodium 137 136 - 145 mmol/L    Potassium 4.4 3.5 - 5.3 mmol/L    Chloride 109 (H) 98 - 107 mmol/L    Bicarbonate 23 21 - 32 mmol/L    Anion Gap 9 (L) 10 - 20 mmol/L    Urea Nitrogen 28 (H) 6 - 23 mg/dL    Creatinine 2.74 (H) 0.50 - 1.30 mg/dL    eGFR 25 (L) >60 mL/min/1.73m*2    Calcium 10.0 8.6 - 10.6 mg/dL    Albumin 3.1 (L) 3.4 - 5.0 g/dL    Alkaline Phosphatase 61 33 - 136 U/L    Total Protein 5.8 (L) 6.4 - 8.2 g/dL    AST 19 9 - 39 U/L    Bilirubin, Total 0.5 0.0 - 1.2 mg/dL    ALT 6 (L) 10 - 52 U/L   Brain Natriuretic Peptide   Result Value Ref Range     (H) 0 " - 99 pg/mL   Troponin I, High Sensitivity   Result Value Ref Range    Troponin I, High Sensitivity 53 0 - 53 ng/L   Blood Culture    Specimen: Peripheral Venipuncture; Blood culture   Result Value Ref Range    Blood Culture Loaded on Instrument - Culture in progress    Blood Culture    Specimen: Peripheral Venipuncture; Blood culture   Result Value Ref Range    Blood Culture Loaded on Instrument - Culture in progress    Tacrolimus level   Result Value Ref Range    Tacrolimus  10.0 <=15.0 ng/mL   Lipase   Result Value Ref Range    Lipase 7 (L) 9 - 82 U/L   Sars-CoV-2 PCR, Symptomatic   Result Value Ref Range    Coronavirus 2019, PCR Not Detected Not Detected   Influenza A, and B PCR   Result Value Ref Range    Flu A Result Not Detected Not Detected    Flu B Result Not Detected Not Detected   RSV PCR   Result Value Ref Range    RSV PCR Not Detected Not Detected   ECG 12 lead   Result Value Ref Range    Ventricular Rate 67 BPM    Atrial Rate 67 BPM    MT Interval 200 ms    QRS Duration 144 ms    QT Interval 420 ms    QTC Calculation(Bazett) 443 ms    P Axis -17 degrees    R Axis 70 degrees    T Axis 21 degrees    QRS Count 11 beats    Q Onset 215 ms    P Onset 115 ms    P Offset 180 ms    T Offset 425 ms    QTC Fredericia 436 ms   Urinalysis with Reflex Microscopic and Culture   Result Value Ref Range    Color, Urine Straw Straw, Yellow    Appearance, Urine Clear Clear    Specific Gravity, Urine 1.009 1.005 - 1.035    pH, Urine 7.0 5.0, 5.5, 6.0, 6.5, 7.0, 7.5, 8.0    Protein, Urine >=500 (3+) (N) NEGATIVE mg/dL    Glucose, Urine NEGATIVE NEGATIVE mg/dL    Blood, Urine SMALL (1+) (A) NEGATIVE    Ketones, Urine NEGATIVE NEGATIVE mg/dL    Bilirubin, Urine NEGATIVE NEGATIVE    Urobilinogen, Urine <2.0 <2.0 mg/dL    Nitrite, Urine NEGATIVE NEGATIVE    Leukocyte Esterase, Urine NEGATIVE NEGATIVE   Extra Urine Gray Tube   Result Value Ref Range    Extra Tube Hold for add-ons.    Troponin I, High Sensitivity   Result Value  Ref Range    Troponin I, High Sensitivity 48 0 - 53 ng/L   Type And Screen   Result Value Ref Range    ABO TYPE O     Rh TYPE POS     ANTIBODY SCREEN NEG    Urinalysis Microscopic   Result Value Ref Range    WBC, Urine NONE 1-5, NONE /HPF    RBC, Urine 1-2 NONE, 1-2, 3-5 /HPF    Squamous Epithelial Cells, Urine 1-9 (SPARSE) Reference range not established. /HPF   Prepare RBC: 1 Units, Irradiated, Leukocytes Reduced (CMV reduced risk)   Result Value Ref Range    PRODUCT CODE E2999O54     Unit Number [id number]     Unit ABO O     Unit RH POS     XM INTEP COMP     Dispense Status IS     Blood Expiration Date November 14, 2023 23:59 EST     PRODUCT BLOOD TYPE 5100     UNIT VOLUME 288      ECG: normal sinus rhythm, T wave abnormality noted in V2-V5.       CXR:  Interval resolution previous pulmonary edema and near complete  resolution of now trace pleural effusions.  2. Cardiomegaly.      CT CAP:   No acute arterial abnormality. Arterial vasculature of the bowel  appears patent..  2. Mesenteric edema with diffuse small bowel wall haziness,  thickening, and edema which can be seen in the setting of enteritis.  No evidence of perforation.  3. Compared to prior CT dated 10/06/2023, there has been interval  development of a small simple fluid collection within the  subdiaphragmatic region overlying the liver dome measuring 3.6 x 1.3  cm. No significant adjacent inflammatory changes or rim enhancement.  4. Mild bladder wall thickening which may be indicative of cystitis.  5. Status post renal transplant with severe atrophy of the bilateral  native kidneys. The transplanted kidney is in appearance.  6. Stable appearance of small bilateral pleural effusions.  7. Mild ground-glass opacification of the bilateral lung bases,  right-greater-than-left, likely related to atelectasis however  infectious process can not be ruled out.  8. Cardiomegaly mildly increased compared to prior CT dated  10/06/2023.     Previous SPEP from  "2022:   \"Monoclonal IgA lambda in the beta region at 0.1 g/dL and   a vague monoclonal IgG lambda in the gamma region at 0.1 g/dL.   Suggest further evaluation for the diagnosis of plasma cell dyscrasia. \"       Assessment/Plan   Principal Problem:    Pancytopenia (CMS/HCC)    This is a 68 y.o. male with a relevant PMH of ESRD s/p 2 renal transplants (1992, 2013, on prednisone, tacro, azathioprine), CKD3, angina (last EF 60-65% 2/201), DM2 on insulin (last A1c 8.4% 7/2023), HTN, prostate cancer s/p radical prostectomy, HCV (RNA not detected 10/18/23) due to transfusion presenting with worsening diffuse generalized abdominal pain, found on workup to have pancytopenia and on CT imaging to have mesenteric edema.     #pancytopenia  :Workup of acquired pancytopenia includes malignancies (acute leukemia, chronic leukemia, myelodysplastic syndrome, MM), immune suppression due to cytotoxic drugs, autoimmune conditions, marrow suppression due to infectious etiologies, or consumption due to microangiopathy.   Cytotoxic drugs could be a possibility; azathioprine has been linked to pancytopenia, and tacrolimus could result in thrombotic microangiopathy. Malignancies are a possibility and would need smear/BMB for further evaluation. Infectious etiologies would include parvovirus  - peripheral smear ordered to workup blood dyscrasias  - bone marrow biopsy, consult placed to hematology  - transfusion to Hb 7  - Plt transfusion only needed to 10 (patient is not actively bleeding)  - if becomes febrile, start on vanc/zosyn for febrile neutropenia  - ordered hemolysis/DIC labs, coag studies  - ordered parvovirus  - ordered reticulocytes  - SPEP/free kappa lambda to workup possible multiple myeloma  - quantitative Ig    #abdominal pain  :Chronic, unclear etiology. Mesenteric edema on CT is new compared to last CT done 10/6. Potentially sequela of 9/2023 cholecystectomy.  - pain control: scheduled tylenol 975 mg, PO dilaudid (1mg " moderate pain, 2 mg severe), lidocaine patch back    #transplant  - transplant neph consulted, rec'd hold azathioprine. C/w tacrolimus, prednisone  - last tacro level done in ED 10, within therapeutic range    F: prn  E: prn  N: cw home pantoprazole, NPO starting midnight 10/19    DVT prophylaxis: hold    Code status: full code  NOK: Amalia (): [phone]         MARRY RODRIGUEZ, MS-3

## 2023-10-19 NOTE — ADDENDUM NOTE
Addended by: SHY LI on: 10/19/2023 03:44 PM     Modules accepted: Orders     C/o vomiting x3 days. Denies fevers at home. Pt mentions her stomach does hurt. Took pepto and \"fever medication\" at home pta.

## 2023-10-19 NOTE — ED TRIAGE NOTES
Pt states that he has been having body pain since this morning. Pt is also complaining of sob. Pt states that he was told by his pcp on Tuesday that his blood is low.

## 2023-10-19 NOTE — CONSULTS
Reason For Consult  pancytopenia    History Of Present Illness  Manolo Bashir is a 68 y.o. male with a pmh of ESRD, Hep C( most recent PCR showed no detection), and a kidney transplant 13 years ago who has been on tacrolimus,  who presented to ED for worsening abdominal pain and body pain. Patient was rseen by outpatient hematology on 10/18 and labs were 2.1 WBC, 8.1 hgb, and 115 platelets. However labs in the ED on 10/19 revealed a 1.3 WBC, 6.6 hgb, and 15 platelet count. We were consulted for workup and recommendations for the acutely worsening  pancytopenia.      Past Medical History  He has a past medical history of Chronic kidney disease, stage 3 unspecified (CMS/HCC) (09/26/2018), COVID-19 (06/18/2020), Diabetes (CMS/[facility]), HTN (hypertension), Other long term (current) drug therapy (07/20/2021), Personal history of other diseases of the circulatory system, Personal history of other infectious and parasitic diseases (08/17/2015), Polyp, colonic (08/17/2023), Primary osteoarthritis of both ankles (08/17/2023), Tubular adenoma of colon (08/17/2023), and Unspecified kidney failure (08/17/2016).    Surgical History  He has a past surgical history that includes Prostatectomy (10/11/2013); Ileostomy (04/25/2017); Other surgical history (04/21/2017); Ileostomy closure (08/17/2015); Other surgical history (08/17/2015); Other surgical history (08/17/2015); US guided percutaneous peritoneal or retroperitoneal fluid collection drainage (10/20/2022); transplant, kidney, open (1992); and transplant, kidney, open (2013).     Social History  He reports that he has never smoked. He has never used smokeless tobacco. He reports current alcohol use. He reports current drug use. Drug: Oxycodone.    Family History  Family History   Problem Relation Name Age of Onset    Bone cancer Mother      Other (corona's sarcome of the bone marrow) Mother      Prostate cancer Father      Diabetes Other Family Hist     Hypertension Other Family  "Hist         Allergies  Patient has no known allergies.    Review of Systems  No fever  12 lb weight loss in past 3 months  No sob  No chest pain     Physical Exam  Gen: no acute distress, sleeping  HEENT: no cervical LAD, atraumatic, normocephalic  Cardiac: normal rate and rhythm, normal s1 and s2, no mgr  Resp: no increased work of breathing  Gi: non ttp in all 4q  MSK: ttp of the R lateral lower back  Skin: multiple previous AV fistula sites, previous scars on wrists  Neuro: aox3  Psych: appropriate mood and affect  Last Recorded Vitals  Blood pressure (!) 191/99, pulse 76, temperature 36.7 °C (98.1 °F), temperature source Temporal, resp. rate 16, height 1.727 m (5' 8\"), weight 68 kg (150 lb), SpO2 94 %.    Relevant Results  Lab Results   Component Value Date    WBC 1.3 (L) 10/19/2023    HGB 6.6 (L) 10/19/2023    HCT 18.4 (L) 10/19/2023    MCV 77 (L) 10/19/2023    PLT 15 (LL) 10/19/2023               Smear on 10/19  WBC: normal morphology  RBC: few fragment cells, polychromasia, no franklin cells   Platelet: no plt clumping noted,     Assessment/Plan     Manolo Bashir is a 68 y.o. male with a pmh of ESRD, Hep C( most recent PCR showed no detection), and a kidney transplant 13 years ago who has been on tacrolimus,  who presented to ED for worsening abdominal pain and body pain. Patient was rseen by outpatient hematology on 10/18 and labs were 2.1 WBC, 8.1 hgb, and 115 platelets. However labs in the ED on 10/19 revealed a 1.3 WBC, 6.6 hgb, and 15 platelet count. We were consulted for workup and recommendations for the acutely worsening  pancytopenia. Patient was given a unit of blood in the ED.     B12 was normal in September, folate was normal in September  AST and ALT were normal on this admission, retics were normal, tacrolimus titer was normal, and LDH was normal.  Awaiting results of hapto, pt and aptt, parvo, cmv, ebv.     Smear on 10/19 revealed normal WBC morphology, and few fragmented RBC cells polychromasia, " no franklin cells, and no plt clumping.     Overall patient is feeling well, and clinically looks ok. The most likely cause of the acute pancytopenia to be infectious. However we want to confirm this is not a lab error. Additionally, further workup is needed in order to better characterize the pancytopenia.     Recommendations:   - no urgent bone marrow biopsy indicated  - repeat cbc as drop in plt count seemed too abrupt  - daily LDH, hapto,   - daily cbc with diff  - coags, fibrinogen  - transfuse with plts if <10K, or if bleeding and <50K    Patient discussed with Attending Dr. Gretel Minor  Heme/Onc  Medical Student

## 2023-10-20 ENCOUNTER — PHARMACY VISIT (OUTPATIENT)
Dept: PHARMACY | Facility: CLINIC | Age: 68
End: 2023-10-20
Payer: MEDICAID

## 2023-10-20 VITALS
OXYGEN SATURATION: 95 % | WEIGHT: 150 LBS | HEIGHT: 68 IN | DIASTOLIC BLOOD PRESSURE: 65 MMHG | SYSTOLIC BLOOD PRESSURE: 149 MMHG | BODY MASS INDEX: 22.73 KG/M2 | HEART RATE: 75 BPM | TEMPERATURE: 98.4 F | RESPIRATION RATE: 18 BRPM

## 2023-10-20 LAB
ALBUMIN SERPL BCP-MCNC: 3 G/DL (ref 3.4–5)
ANION GAP SERPL CALC-SCNC: 11 MMOL/L (ref 10–20)
APTT PPP: 34 SECONDS (ref 27–38)
BASOPHILS # BLD AUTO: 0.01 X10*3/UL (ref 0–0.1)
BASOPHILS NFR BLD AUTO: 0.4 %
BUN SERPL-MCNC: 32 MG/DL (ref 6–23)
CALCIUM SERPL-MCNC: 9.8 MG/DL (ref 8.6–10.6)
CHLORIDE SERPL-SCNC: 107 MMOL/L (ref 98–107)
CO2 SERPL-SCNC: 25 MMOL/L (ref 21–32)
CREAT SERPL-MCNC: 2.63 MG/DL (ref 0.5–1.3)
D DIMER PPP FEU-MCNC: 632 NG/ML FEU
EOSINOPHIL # BLD AUTO: 0.11 X10*3/UL (ref 0–0.7)
EOSINOPHIL NFR BLD AUTO: 4.3 %
ERYTHROCYTE [DISTWIDTH] IN BLOOD BY AUTOMATED COUNT: 15.4 % (ref 11.5–14.5)
ERYTHROCYTE [DISTWIDTH] IN BLOOD BY AUTOMATED COUNT: 15.6 % (ref 11.5–14.5)
FIBRINOGEN PPP-MCNC: 250 MG/DL (ref 200–400)
GFR SERPL CREATININE-BSD FRML MDRD: 26 ML/MIN/1.73M*2
GLUCOSE BLD MANUAL STRIP-MCNC: 128 MG/DL (ref 74–99)
GLUCOSE BLD MANUAL STRIP-MCNC: 207 MG/DL (ref 74–99)
GLUCOSE BLD MANUAL STRIP-MCNC: 240 MG/DL (ref 74–99)
GLUCOSE SERPL-MCNC: 230 MG/DL (ref 74–99)
HAPTOGLOB SERPL NEPH-MCNC: NORMAL G/DL
HCT VFR BLD AUTO: 24.4 % (ref 41–52)
HCT VFR BLD AUTO: 26.3 % (ref 41–52)
HGB BLD-MCNC: 8.2 G/DL (ref 13.5–17.5)
HGB BLD-MCNC: 8.7 G/DL (ref 13.5–17.5)
HGB RETIC QN: 32 PG (ref 28–38)
IGA SERPL-MCNC: 523 MG/DL (ref 70–400)
IGE SERPL-ACNC: 48 IU/ML (ref 0–214)
IGG SERPL-MCNC: 1030 MG/DL (ref 700–1600)
IGM SERPL-MCNC: 20 MG/DL (ref 40–230)
IMM GRANULOCYTES # BLD AUTO: 0.01 X10*3/UL (ref 0–0.7)
IMM GRANULOCYTES NFR BLD AUTO: 0.4 % (ref 0–0.9)
IMMATURE RETIC FRACTION: 3.5 %
INR PPP: 1.2 (ref 0.9–1.1)
LDH SERPL L TO P-CCNC: 197 U/L (ref 84–246)
LYMPHOCYTES # BLD AUTO: 0.7 X10*3/UL (ref 1.2–4.8)
LYMPHOCYTES NFR BLD AUTO: 27.3 %
MAGNESIUM SERPL-MCNC: 1.53 MG/DL (ref 1.6–2.4)
MCH RBC QN AUTO: 27.4 PG (ref 26–34)
MCH RBC QN AUTO: 28.4 PG (ref 26–34)
MCHC RBC AUTO-ENTMCNC: 33.1 G/DL (ref 32–36)
MCHC RBC AUTO-ENTMCNC: 33.6 G/DL (ref 32–36)
MCV RBC AUTO: 83 FL (ref 80–100)
MCV RBC AUTO: 84 FL (ref 80–100)
MONOCYTES # BLD AUTO: 0.34 X10*3/UL (ref 0.1–1)
MONOCYTES NFR BLD AUTO: 13.3 %
NEUTROPHILS # BLD AUTO: 1.39 X10*3/UL (ref 1.2–7.7)
NEUTROPHILS NFR BLD AUTO: 54.3 %
NRBC BLD-RTO: 0 /100 WBCS (ref 0–0)
NRBC BLD-RTO: 0 /100 WBCS (ref 0–0)
PHOSPHATE SERPL-MCNC: 3.2 MG/DL (ref 2.5–4.9)
PLATELET # BLD AUTO: 101 X10*3/UL (ref 150–450)
PLATELET # BLD AUTO: 92 X10*3/UL (ref 150–450)
PMV BLD AUTO: 11.2 FL (ref 7.5–11.5)
PMV BLD AUTO: 9.8 FL (ref 7.5–11.5)
POTASSIUM SERPL-SCNC: 4.5 MMOL/L (ref 3.5–5.3)
PROT SERPL-MCNC: 5.5 G/DL (ref 6.4–8.2)
PROTHROMBIN TIME: 13.9 SECONDS (ref 9.8–12.8)
RBC # BLD AUTO: 2.89 X10*6/UL (ref 4.5–5.9)
RBC # BLD AUTO: 3.17 X10*6/UL (ref 4.5–5.9)
RETICS #: 0.02 X10*6/UL (ref 0.02–0.11)
RETICS/RBC NFR AUTO: 0.8 % (ref 0.5–2)
SODIUM SERPL-SCNC: 138 MMOL/L (ref 136–145)
TACROLIMUS BLD-MCNC: 9.3 NG/ML
WBC # BLD AUTO: 2.6 X10*3/UL (ref 4.4–11.3)
WBC # BLD AUTO: 2.7 X10*3/UL (ref 4.4–11.3)

## 2023-10-20 PROCEDURE — 85610 PROTHROMBIN TIME: CPT

## 2023-10-20 PROCEDURE — 82947 ASSAY GLUCOSE BLOOD QUANT: CPT

## 2023-10-20 PROCEDURE — 82784 ASSAY IGA/IGD/IGG/IGM EACH: CPT | Mod: CMCLAB

## 2023-10-20 PROCEDURE — 99239 HOSP IP/OBS DSCHRG MGMT >30: CPT | Performed by: STUDENT IN AN ORGANIZED HEALTH CARE EDUCATION/TRAINING PROGRAM

## 2023-10-20 PROCEDURE — 2500000002 HC RX 250 W HCPCS SELF ADMINISTERED DRUGS (ALT 637 FOR MEDICARE OP, ALT 636 FOR OP/ED)

## 2023-10-20 PROCEDURE — 2500000001 HC RX 250 WO HCPCS SELF ADMINISTERED DRUGS (ALT 637 FOR MEDICARE OP)

## 2023-10-20 PROCEDURE — 36415 COLL VENOUS BLD VENIPUNCTURE: CPT | Mod: CMCLAB

## 2023-10-20 PROCEDURE — 2500000005 HC RX 250 GENERAL PHARMACY W/O HCPCS

## 2023-10-20 PROCEDURE — RXMED WILLOW AMBULATORY MEDICATION CHARGE

## 2023-10-20 PROCEDURE — 96372 THER/PROPH/DIAG INJ SC/IM: CPT

## 2023-10-20 PROCEDURE — 2500000004 HC RX 250 GENERAL PHARMACY W/ HCPCS (ALT 636 FOR OP/ED)

## 2023-10-20 PROCEDURE — 89240 UNLISTED MISC PATH TEST: CPT | Performed by: STUDENT IN AN ORGANIZED HEALTH CARE EDUCATION/TRAINING PROGRAM

## 2023-10-20 PROCEDURE — 82785 ASSAY OF IGE: CPT

## 2023-10-20 PROCEDURE — 86747 PARVOVIRUS ANTIBODY: CPT | Mod: CMCLAB

## 2023-10-20 PROCEDURE — 85379 FIBRIN DEGRADATION QUANT: CPT

## 2023-10-20 PROCEDURE — 85384 FIBRINOGEN ACTIVITY: CPT

## 2023-10-20 PROCEDURE — 85045 AUTOMATED RETICULOCYTE COUNT: CPT | Mod: CMCLAB

## 2023-10-20 PROCEDURE — 84520 ASSAY OF UREA NITROGEN: CPT | Mod: CMCLAB

## 2023-10-20 PROCEDURE — 83010 ASSAY OF HAPTOGLOBIN QUANT: CPT

## 2023-10-20 PROCEDURE — 85025 COMPLETE CBC W/AUTO DIFF WBC: CPT | Mod: CMCLAB

## 2023-10-20 PROCEDURE — 99221 1ST HOSP IP/OBS SF/LOW 40: CPT

## 2023-10-20 PROCEDURE — 83735 ASSAY OF MAGNESIUM: CPT | Mod: CMCLAB

## 2023-10-20 PROCEDURE — 3490 HC RX 250 GENERAL PHARMACY W/ HCPCS (ALT 636 FOR OP/ED)

## 2023-10-20 PROCEDURE — 80197 ASSAY OF TACROLIMUS: CPT

## 2023-10-20 RX ORDER — CINACALCET 30 MG/1
30 TABLET, FILM COATED ORAL DAILY
Qty: 30 TABLET | Refills: 0 | Status: SHIPPED | OUTPATIENT
Start: 2023-10-20 | End: 2023-11-12 | Stop reason: SDUPTHER

## 2023-10-20 RX ORDER — HYDROMORPHONE HYDROCHLORIDE 1 MG/ML
0.4 INJECTION, SOLUTION INTRAMUSCULAR; INTRAVENOUS; SUBCUTANEOUS EVERY 6 HOURS PRN
Status: DISCONTINUED | OUTPATIENT
Start: 2023-10-20 | End: 2023-10-20 | Stop reason: HOSPADM

## 2023-10-20 RX ORDER — TACROLIMUS 0.5 MG/1
2.5 CAPSULE, GELATIN COATED ORAL 2 TIMES DAILY
Qty: 300 CAPSULE | Refills: 1 | Status: SHIPPED | OUTPATIENT
Start: 2023-10-20 | End: 2023-11-22 | Stop reason: HOSPADM

## 2023-10-20 RX ORDER — MAGNESIUM SULFATE HEPTAHYDRATE 40 MG/ML
2 INJECTION, SOLUTION INTRAVENOUS ONCE
Status: COMPLETED | OUTPATIENT
Start: 2023-10-20 | End: 2023-10-20

## 2023-10-20 RX ORDER — LOSARTAN POTASSIUM 25 MG/1
25 TABLET ORAL DAILY
Status: DISCONTINUED | OUTPATIENT
Start: 2023-10-20 | End: 2023-10-20 | Stop reason: HOSPADM

## 2023-10-20 RX ORDER — LORATADINE 10 MG/1
10 TABLET ORAL DAILY
Qty: 30 TABLET | Refills: 0 | Status: SHIPPED | OUTPATIENT
Start: 2023-10-21 | End: 2023-12-21 | Stop reason: HOSPADM

## 2023-10-20 RX ORDER — HYDROMORPHONE HYDROCHLORIDE 2 MG/1
1 TABLET ORAL EVERY 6 HOURS PRN
Qty: 10 TABLET | Refills: 0 | Status: SHIPPED | OUTPATIENT
Start: 2023-10-20 | End: 2023-10-25

## 2023-10-20 RX ORDER — AZATHIOPRINE 50 MG/1
25 TABLET ORAL DAILY
Qty: 15 TABLET | Refills: 0 | Status: SHIPPED | OUTPATIENT
Start: 2023-10-20 | End: 2023-11-13 | Stop reason: SDUPTHER

## 2023-10-20 RX ORDER — ACETAMINOPHEN 325 MG/1
975 TABLET ORAL EVERY 8 HOURS
Qty: 270 TABLET | Refills: 0 | Status: SHIPPED | OUTPATIENT
Start: 2023-10-20 | End: 2023-11-22 | Stop reason: HOSPADM

## 2023-10-20 RX ORDER — LORATADINE 10 MG/1
10 TABLET ORAL DAILY
Status: DISCONTINUED | OUTPATIENT
Start: 2023-10-20 | End: 2023-10-20 | Stop reason: HOSPADM

## 2023-10-20 RX ORDER — PREDNISONE 5 MG/1
10 TABLET ORAL EVERY MORNING
Qty: 60 TABLET | Refills: 0 | Status: SHIPPED | OUTPATIENT
Start: 2023-10-20 | End: 2023-11-13 | Stop reason: SDUPTHER

## 2023-10-20 RX ADMIN — ACETAMINOPHEN 975 MG: 325 TABLET ORAL at 15:30

## 2023-10-20 RX ADMIN — LORATADINE 10 MG: 10 TABLET ORAL at 10:36

## 2023-10-20 RX ADMIN — PANTOPRAZOLE SODIUM 40 MG: 40 TABLET, DELAYED RELEASE ORAL at 07:45

## 2023-10-20 RX ADMIN — HYDROMORPHONE HYDROCHLORIDE 1 MG: 2 TABLET ORAL at 07:45

## 2023-10-20 RX ADMIN — CARVEDILOL 25 MG: 25 TABLET, FILM COATED ORAL at 17:06

## 2023-10-20 RX ADMIN — INSULIN LISPRO 2 UNITS: 100 INJECTION, SOLUTION INTRAVENOUS; SUBCUTANEOUS at 17:06

## 2023-10-20 RX ADMIN — MAGNESIUM SULFATE HEPTAHYDRATE 2 G: 40 INJECTION, SOLUTION INTRAVENOUS at 12:28

## 2023-10-20 RX ADMIN — AMLODIPINE BESYLATE 10 MG: 10 TABLET ORAL at 07:45

## 2023-10-20 RX ADMIN — ACETAMINOPHEN 975 MG: 325 TABLET ORAL at 09:03

## 2023-10-20 RX ADMIN — HYDROMORPHONE HYDROCHLORIDE 1 MG: 2 TABLET ORAL at 12:27

## 2023-10-20 RX ADMIN — PREDNISONE 5 MG: 5 TABLET ORAL at 09:03

## 2023-10-20 RX ADMIN — LIDOCAINE 1 PATCH: 4 PATCH TOPICAL at 09:03

## 2023-10-20 RX ADMIN — TACROLIMUS 3 MG: 1 CAPSULE ORAL at 09:03

## 2023-10-20 RX ADMIN — INSULIN LISPRO 2 UNITS: 100 INJECTION, SOLUTION INTRAVENOUS; SUBCUTANEOUS at 09:16

## 2023-10-20 RX ADMIN — LOSARTAN POTASSIUM 25 MG: 25 TABLET, FILM COATED ORAL at 10:32

## 2023-10-20 RX ADMIN — CARVEDILOL 25 MG: 25 TABLET, FILM COATED ORAL at 07:45

## 2023-10-20 ASSESSMENT — PAIN SCALES - GENERAL
PAINLEVEL_OUTOF10: 5 - MODERATE PAIN
PAINLEVEL_OUTOF10: 8
PAINLEVEL_OUTOF10: 10 - WORST POSSIBLE PAIN
PAINLEVEL_OUTOF10: 10 - WORST POSSIBLE PAIN
PAINLEVEL_OUTOF10: 8
PAINLEVEL_OUTOF10: 10 - WORST POSSIBLE PAIN

## 2023-10-20 ASSESSMENT — ACTIVITIES OF DAILY LIVING (ADL)
LACK_OF_TRANSPORTATION: NO
LACK_OF_TRANSPORTATION: NO

## 2023-10-20 ASSESSMENT — PAIN DESCRIPTION - DESCRIPTORS
DESCRIPTORS: DISCOMFORT

## 2023-10-20 ASSESSMENT — PAIN - FUNCTIONAL ASSESSMENT
PAIN_FUNCTIONAL_ASSESSMENT: 0-10

## 2023-10-20 NOTE — HOSPITAL COURSE
This is a 68 y.o. male with a relevant PMH of ESRD s/p 2 renal transplants (1992, 2013, on prednisone, tacro, azathioprine), CKD3, angina (last EF 60-65% 2/201), DM2 on insulin (last A1c 8.4% 7/2023), HTN, prostate cancer s/p radical prostectomy, HCV (RNA not detected 10/18/23) due to transfusion presenting with worsening diffuse generalized abdominal pain, found on workup to have pancytopenia with Plt of 15 and on CT imaging to have mesenteric edema. Repeat CBC shows WBC of 2.6, Hb of 8.2, Plt of 92, counts that are much more consistent with patient's recent baseline over the past few months. Therefore the extreme thrombocytopenia seen on original CBC could have been a lab error. Smear from 10/19 shows normal WBC morphology, few fragment cells on RBC, no plt clumping. Coag studies indicate only slightly increased INR and PT, and d-dimer non VTE slightly elevated; not suggestive of coagulation abnormalities.  SPEP/free kappa lambda will be ordered to workup possible plasma cell dyscrasia. Heme/onc recs from 10/19 were no urgent BMB indicated, daily CBC with diff, daily LDH, hapto, coags, fibrinogen. Outpt BMB will be scheduled for next week.   Regarding patient's abdominal pain, trial of antihistamine initiated to see if this alleviates pain; if so, suggestive of G-CSF induced bone pain. He was discharged 10/20 with PO dilaudid for pain control and antihistamine. Per nephro: his tacrolimus was decreased to 2.5 mg BID(from 3 mg BID), prednisone increased to 10 mg daily (from 5 mg daily) and azathioprine was decreased to 25 mg daily (from 50 mg daily). He will be referred for outpatient bone marrow biopsy.

## 2023-10-20 NOTE — PROGRESS NOTES
Transitional Care Coordination Progress Note:  Patient discussed during interdisciplinary rounds.  Team members present: ABIDA ZABALA  Plan per Medical/Surgical team: Pt admitted with c/o worsening abdominal pain, found to have worsening pancytopenia of unknown etiology, team completing work up, may need a bone marrow biopsy outpatient.  Payer: [facility]  Status: Inpatient  Discharge disposition: Home  Potential Barriers: none  ADOD: 10/21  Care coordinator will continue to follow for discharge planning needs.     Joyce López RN  Transitional Care Coordinator/TCC  [id number]

## 2023-10-20 NOTE — DISCHARGE SUMMARY
Date of admission: 10/19/2023  Date of discharge: 10/20/2023    Discharge Diagnosis  Pancytopenia (CMS/HCC)    Issues Requiring Follow-Up  [ ] Follow up for scheduling of bone marrow biopsy - already referred   [ ] Follow up with heme/onc for pancytopenia work up results   [ ] Follow up with nephrology     Test Results Pending At Discharge  Pending Labs       Order Current Status    IgG, IgA, IgM In process    Immunoglobulin free LT chains blood In process    Parvovirus B19 antibody, IgG and IgM In process    Pathologist Review-CBC Differential In process    Serum Protein Electrophoresis + Immunofixation In process    Serum Protein Electrophoresis + Immunofixation In process    haptoglobin; [facility]; HAPTO - Miscellaneous Test In process    Blood Culture Preliminary result    Blood Culture Preliminary result            Hospital Course  This is a 68 y.o. male with a relevant PMH of ESRD s/p 2 renal transplants (1992, 2013, on prednisone, tacro, azathioprine), CKD3, angina (last EF 60-65% 2/201), DM2 on insulin (last A1c 8.4% 7/2023), HTN, prostate cancer s/p radical prostectomy, HCV (RNA not detected 10/18/23) due to transfusion presenting with worsening diffuse generalized abdominal pain, found on workup to have pancytopenia with Plt of 15 and on CT imaging to have mesenteric edema. Repeat CBC shows WBC of 2.6, Hb of 8.2, Plt of 92, counts that are much more consistent with patient's recent baseline over the past few months. Therefore the extreme thrombocytopenia seen on original CBC could have been a lab error. Smear from 10/19 shows normal WBC morphology, few fragment cells on RBC, no plt clumping. Coag studies indicate only slightly increased INR and PT, and d-dimer non VTE slightly elevated; not suggestive of coagulation abnormalities.  SPEP/free kappa lambda will be ordered to workup possible plasma cell dyscrasia. Heme/onc recs from 10/19 were no urgent BMB indicated, daily CBC with diff, daily LDH,  hapto, coags, fibrinogen. Outpt BMB will be scheduled for next week.   Regarding patient's abdominal pain, trial of antihistamine initiated to see if this alleviates pain; if so, suggestive of G-CSF induced bone pain. He was discharged 10/20 with PO dilaudid for pain control and antihistamine. Per nephro: his tacrolimus was decreased to 2.5 mg BID(from 3 mg BID), prednisone increased to 10 mg daily (from 5 mg daily) and azathioprine was decreased to 25 mg daily (from 50 mg daily). He will be referred for outpatient bone marrow biopsy.     Pertinent Physical Exam At Time of Discharge  Vitals:    10/20/23 1706   BP: 149/65   Pulse: 75   Resp:    Temp:    SpO2:      Physical Exam  HENT:      Head: Normocephalic and atraumatic.   Eyes:      Extraocular Movements: Extraocular movements intact.      Pupils: Pupils are equal, round, and reactive to light.   Cardiovascular:      Rate and Rhythm: Normal rate and regular rhythm.      Heart sounds: Murmur heard.      Comments: Systolic flow murmur, nonradiating, heard best at the aortic region.   Pulmonary:      Effort: Pulmonary effort is normal. No respiratory distress.      Breath sounds: Normal breath sounds.   Abdominal:      Comments: Diffuse tenderness to palpation.   Notes tenderness when palpating RUQ.   Slight hepatomegaly appreciated.   No splenomegaly appreciated.   No abdominal bruit appreciated.   No distension or guarding noted, no rebound.   No CVA tenderness noted.     Musculoskeletal:      Comments: Diffuse spinal tenderness noted.   No paraspinal tenderness noted.      Skin:     General: Skin is warm and dry.      Findings: No rash.   Neurological:      Mental Status: He is oriented to person, place, and time.   Psychiatric:         Mood and Affect: Mood normal.         Behavior: Behavior normal.       Results for orders placed or performed during the hospital encounter of 10/19/23 (from the past 24 hour(s))   CBC   Result Value Ref Range    WBC 2.7 (L) 4.4  - 11.3 x10*3/uL    nRBC 0.0 0.0 - 0.0 /100 WBCs    RBC 3.17 (L) 4.50 - 5.90 x10*6/uL    Hemoglobin 8.7 (L) 13.5 - 17.5 g/dL    Hematocrit 26.3 (L) 41.0 - 52.0 %    MCV 83 80 - 100 fL    MCH 27.4 26.0 - 34.0 pg    MCHC 33.1 32.0 - 36.0 g/dL    RDW 15.4 (H) 11.5 - 14.5 %    Platelets 101 (L) 150 - 450 x10*3/uL    MPV 11.2 7.5 - 11.5 fL   Reticulocytes   Result Value Ref Range    Retic % 0.8 0.5 - 2.0 %    Retic Absolute 0.024 0.017 - 0.110 x10*6/uL    Reticulocyte Hemoglobin 32 28 - 38 pg    Immature Retic fraction 3.5 <=16.0 %   Fibrinogen   Result Value Ref Range    Fibrinogen 250 200 - 400 mg/dL   D-dimer, Non VTE   Result Value Ref Range    D-Dimer Non VTE, Quant (ng/mL FEU) 632 (H) <=500 ng/mL FEU   Coagulation Screen   Result Value Ref Range    Protime 13.9 (H) 9.8 - 12.8 seconds    INR 1.2 (H) 0.9 - 1.1    aPTT 34 27 - 38 seconds   Haptoglobin   Result Value Ref Range    Haptoglobin     CBC and Auto Differential   Result Value Ref Range    WBC 2.6 (L) 4.4 - 11.3 x10*3/uL    nRBC 0.0 0.0 - 0.0 /100 WBCs    RBC 2.89 (L) 4.50 - 5.90 x10*6/uL    Hemoglobin 8.2 (L) 13.5 - 17.5 g/dL    Hematocrit 24.4 (L) 41.0 - 52.0 %    MCV 84 80 - 100 fL    MCH 28.4 26.0 - 34.0 pg    MCHC 33.6 32.0 - 36.0 g/dL    RDW 15.6 (H) 11.5 - 14.5 %    Platelets 92 (L) 150 - 450 x10*3/uL    MPV 9.8 7.5 - 11.5 fL    Neutrophils % 54.3 40.0 - 80.0 %    Immature Granulocytes %, Automated 0.4 0.0 - 0.9 %    Lymphocytes % 27.3 13.0 - 44.0 %    Monocytes % 13.3 2.0 - 10.0 %    Eosinophils % 4.3 0.0 - 6.0 %    Basophils % 0.4 0.0 - 2.0 %    Neutrophils Absolute 1.39 1.20 - 7.70 x10*3/uL    Immature Granulocytes Absolute, Automated 0.01 0.00 - 0.70 x10*3/uL    Lymphocytes Absolute 0.70 (L) 1.20 - 4.80 x10*3/uL    Monocytes Absolute 0.34 0.10 - 1.00 x10*3/uL    Eosinophils Absolute 0.11 0.00 - 0.70 x10*3/uL    Basophils Absolute 0.01 0.00 - 0.10 x10*3/uL   Renal Function Panel   Result Value Ref Range    Glucose 230 (H) 74 - 99 mg/dL    Sodium 138  136 - 145 mmol/L    Potassium 4.5 3.5 - 5.3 mmol/L    Chloride 107 98 - 107 mmol/L    Bicarbonate 25 21 - 32 mmol/L    Anion Gap 11 10 - 20 mmol/L    Urea Nitrogen 32 (H) 6 - 23 mg/dL    Creatinine 2.63 (H) 0.50 - 1.30 mg/dL    eGFR 26 (L) >60 mL/min/1.73m*2    Calcium 9.8 8.6 - 10.6 mg/dL    Phosphorus 3.2 2.5 - 4.9 mg/dL    Albumin 3.0 (L) 3.4 - 5.0 g/dL   Magnesium   Result Value Ref Range    Magnesium 1.53 (L) 1.60 - 2.40 mg/dL   Tacrolimus   Result Value Ref Range    Tacrolimus  9.3 <=15.0 ng/mL   IgE   Result Value Ref Range    IgE 48 0 - 214 IU/mL   POCT GLUCOSE   Result Value Ref Range    POCT Glucose 207 (H) 74 - 99 mg/dL   POCT GLUCOSE   Result Value Ref Range    POCT Glucose 128 (H) 74 - 99 mg/dL   POCT GLUCOSE   Result Value Ref Range    POCT Glucose 240 (H) 74 - 99 mg/dL        Home Medications     Medication List      START taking these medications     cinacalcet 30 mg tablet; Commonly known as: Sensipar; Take 1 tablet (30   mg) by mouth once daily. Take with food or shortly afer a meal. Swallow   tablet whole; do not break or divide.   HYDROmorphone 2 mg tablet; Commonly known as: Dilaudid; Take 0.5 tablets   (1 mg) by mouth every 6 hours if needed for severe pain (7 - 10) for up to   5 days.   loratadine 10 mg tablet; Commonly known as: Claritin; Take 1 tablet (10   mg) by mouth once daily. Do not start before October 21, 2023.; Start   taking on: October 21, 2023     CHANGE how you take these medications     acetaminophen 325 mg tablet; Commonly known as: Tylenol; Take 3 tablets   (975 mg) by mouth every 8 hours.; What changed: how much to take, how to   take this, when to take this   azaTHIOprine 50 mg tablet; Commonly known as: Imuran; Take 0.5 tablets   (25 mg) by mouth once daily.; What changed: how much to take   insulin glargine 100 unit/mL injection; Commonly known as: Lantus;   Inject 10 Units under the skin once daily. Take as directed per insulin   instructions.; What changed: how much to  take, when to take this   predniSONE 5 mg tablet; Commonly known as: Deltasone; Take 2 tablets (10   mg) by mouth once daily in the morning.; What changed: how much to take   * tacrolimus 0.1 % ointment; Commonly known as: Protopic; Apply   topically 2 times a day.; What changed: Another medication with the same   name was changed. Make sure you understand how and when to take each.   * Prograf 0.5 mg capsule; Take 2.5 mg by mouth 2 times a day.; What   changed: medication strength, how much to take, how to take this, when to   take this, additional instructions  * This list has 2 medication(s) that are the same as other medications   prescribed for you. Read the directions carefully, and ask your doctor or   other care provider to review them with you.     CONTINUE taking these medications     amLODIPine 10 mg tablet; Commonly known as: Norvasc   carvedilol 25 mg tablet; Commonly known as: Coreg   docusate sodium 100 mg capsule; Commonly known as: Colace; TAKE 1   CAPSULE BY MOUTH TWO TIMES A DAY TO PREVENT CONSTIPATION   FeroSuL 325 (65 Fe) MG tablet; Generic drug: ferrous sulfate   gabapentin 300 mg capsule; Commonly known as: Neurontin; Take 1 capsule   (300 mg) by mouth 2 times a day.   hydrALAZINE 25 mg tablet; Commonly known as: Apresoline   insulin lispro 100 unit/mL injection; Commonly known as: HumaLOG   losartan 25 mg tablet; Commonly known as: Cozaar   ondansetron ODT 4 mg disintegrating tablet; Commonly known as:   Zofran-ODT   pravastatin 40 mg tablet; Commonly known as: Pravachol   traMADol 50 mg tablet; Commonly known as: Ultram; Take 1 tablet (50 mg)   by mouth every 6 hours if needed for severe pain (7 - 10) for up to 10   days.   Vitamin D3 25 MCG (1000 UT) capsule; Generic drug: cholecalciferol       Outpatient Follow-Up  Future Appointments   Date Time Provider Department [place]   10/25/2023  8:40 AM Estella Quinonez MD [id number] [facility]   11/7/2023 10:20 AM Elizabeth Alejandro MD  [id number] [facility]   11/15/2023  2:15 PM Maddison Faustin, APRN-CNP [id number] [facility]   11/27/2023  3:15 PM Kurtis Jc MD [id number] [facility]   4/16/2024 10:20 AM TXP KIDNEY PROVIDER [id number] [facility]   10/16/2024 10:30 AM Vinicius Araiza MD [id number] [facility]       Verito Guy MD

## 2023-10-20 NOTE — PROGRESS NOTES
"Manolo Bashir is a 68 y.o. male on day 1 of admission presenting with Pancytopenia (CMS/HCC).    Subjective   Patient reports sleeping poorly at night d/t abdominal/back pain, unchanged in location or severity from yesterday. Had a bowel movement yesterday. Denies nausea/vomiting.       Objective     Physical Exam  Constitutional:       Appearance: Normal appearance.   Cardiovascular:      Heart sounds: Murmur heard.      Comments: Systolic flow murmur, nonradiating, heard best at the aortic region.   Pulmonary:      Effort: Pulmonary effort is normal. No respiratory distress.      Breath sounds: Normal breath sounds.   Abdominal:      Comments: Diffuse tenderness to palpation.   Notes tenderness when palpating RUQ.   Slight hepatomegaly appreciated.   No splenomegaly appreciated.   No abdominal bruit appreciated.   No distension or guarding noted, no rebound.   No CVA tenderness noted.     Musculoskeletal:      Comments: Diffuse spinal tenderness noted.   No paraspinal tenderness noted.      Skin:     General: Skin is warm and dry.      Findings: No rash.   Neurological:      Mental Status: He is alert.         Last Recorded Vitals  Blood pressure 144/69, pulse 66, temperature 36.4 °C (97.5 °F), temperature source Temporal, resp. rate 18, height 1.727 m (5' 8\"), weight 68 kg (150 lb), SpO2 99 %.  Intake/Output last 3 Shifts:  I/O last 3 completed shifts:  In: 1288 (18.9 mL/kg) [Blood:288; IV Piggyback:1000]  Out: - (0 mL/kg)   Weight: 68 kg     Relevant Results    Results for orders placed or performed during the hospital encounter of 10/19/23 (from the past 24 hour(s))   Prepare RBC: 1 Units, Irradiated, Leukocytes Reduced (CMV reduced risk)   Result Value Ref Range    PRODUCT CODE A8700Y60     Unit Number [id number]     Unit ABO O     Unit RH POS     XM INTEP COMP     Dispense Status TR     Blood Expiration Date November 14, 2023 23:59 EST     PRODUCT BLOOD TYPE 5100     UNIT VOLUME 288    CBC   Result " Value Ref Range    WBC 2.7 (L) 4.4 - 11.3 x10*3/uL    nRBC 0.0 0.0 - 0.0 /100 WBCs    RBC 3.17 (L) 4.50 - 5.90 x10*6/uL    Hemoglobin 8.7 (L) 13.5 - 17.5 g/dL    Hematocrit 26.3 (L) 41.0 - 52.0 %    MCV 83 80 - 100 fL    MCH 27.4 26.0 - 34.0 pg    MCHC 33.1 32.0 - 36.0 g/dL    RDW 15.4 (H) 11.5 - 14.5 %    Platelets 101 (L) 150 - 450 x10*3/uL    MPV 11.2 7.5 - 11.5 fL   Reticulocytes   Result Value Ref Range    Retic % 0.8 0.5 - 2.0 %    Retic Absolute 0.024 0.017 - 0.110 x10*6/uL    Reticulocyte Hemoglobin 32 28 - 38 pg    Immature Retic fraction 3.5 <=16.0 %   Fibrinogen   Result Value Ref Range    Fibrinogen 250 200 - 400 mg/dL   D-dimer, Non VTE   Result Value Ref Range    D-Dimer Non VTE, Quant (ng/mL FEU) 632 (H) <=500 ng/mL FEU   Coagulation Screen   Result Value Ref Range    Protime 13.9 (H) 9.8 - 12.8 seconds    INR 1.2 (H) 0.9 - 1.1    aPTT 34 27 - 38 seconds   Haptoglobin   Result Value Ref Range    Haptoglobin     CBC and Auto Differential   Result Value Ref Range    WBC 2.6 (L) 4.4 - 11.3 x10*3/uL    nRBC 0.0 0.0 - 0.0 /100 WBCs    RBC 2.89 (L) 4.50 - 5.90 x10*6/uL    Hemoglobin 8.2 (L) 13.5 - 17.5 g/dL    Hematocrit 24.4 (L) 41.0 - 52.0 %    MCV 84 80 - 100 fL    MCH 28.4 26.0 - 34.0 pg    MCHC 33.6 32.0 - 36.0 g/dL    RDW 15.6 (H) 11.5 - 14.5 %    Platelets 92 (L) 150 - 450 x10*3/uL    MPV 9.8 7.5 - 11.5 fL    Neutrophils % 54.3 40.0 - 80.0 %    Immature Granulocytes %, Automated 0.4 0.0 - 0.9 %    Lymphocytes % 27.3 13.0 - 44.0 %    Monocytes % 13.3 2.0 - 10.0 %    Eosinophils % 4.3 0.0 - 6.0 %    Basophils % 0.4 0.0 - 2.0 %    Neutrophils Absolute 1.39 1.20 - 7.70 x10*3/uL    Immature Granulocytes Absolute, Automated 0.01 0.00 - 0.70 x10*3/uL    Lymphocytes Absolute 0.70 (L) 1.20 - 4.80 x10*3/uL    Monocytes Absolute 0.34 0.10 - 1.00 x10*3/uL    Eosinophils Absolute 0.11 0.00 - 0.70 x10*3/uL    Basophils Absolute 0.01 0.00 - 0.10 x10*3/uL   Renal Function Panel   Result Value Ref Range    Glucose  230 (H) 74 - 99 mg/dL    Sodium 138 136 - 145 mmol/L    Potassium 4.5 3.5 - 5.3 mmol/L    Chloride 107 98 - 107 mmol/L    Bicarbonate 25 21 - 32 mmol/L    Anion Gap 11 10 - 20 mmol/L    Urea Nitrogen 32 (H) 6 - 23 mg/dL    Creatinine 2.63 (H) 0.50 - 1.30 mg/dL    eGFR 26 (L) >60 mL/min/1.73m*2    Calcium 9.8 8.6 - 10.6 mg/dL    Phosphorus 3.2 2.5 - 4.9 mg/dL    Albumin 3.0 (L) 3.4 - 5.0 g/dL   Magnesium   Result Value Ref Range    Magnesium 1.53 (L) 1.60 - 2.40 mg/dL   Tacrolimus   Result Value Ref Range    Tacrolimus  9.3 <=15.0 ng/mL   IgE   Result Value Ref Range    IgE 48 0 - 214 IU/mL   POCT GLUCOSE   Result Value Ref Range    POCT Glucose 207 (H) 74 - 99 mg/dL   POCT GLUCOSE   Result Value Ref Range    POCT Glucose 128 (H) 74 - 99 mg/dL      Smear: (per heme/onc consult note 10/19)                Smear on 10/19  WBC: normal morphology  RBC: few fragment cells, polychromasia, no franklin cells   Platelet: no plt clumping noted,            Assessment/Plan   Principal Problem:    Pancytopenia (CMS/HCC)    This is a 68 y.o. male with a relevant PMH of ESRD s/p 2 renal transplants (1992, 2013, on prednisone, tacro, azathioprine), CKD3, angina (last EF 60-65% 2/201), DM2 on insulin (last A1c 8.4% 7/2023), HTN, prostate cancer s/p radical prostectomy, HCV (RNA not detected 10/18/23) due to transfusion presenting with worsening diffuse generalized abdominal pain, found on workup to have pancytopenia and on CT imaging to have mesenteric edema. Repeat CBC shows WBC, plt, and Hb counts that are much more consistent with patient's recent baseline over the past few months. Therefore the extreme thrombocytopenia seen on original CBC could have been a lab error. Smear shows normal WBC morphology, few fragment cells on RBC, no plt clumping. Coag studies indicate only slightly increased INR and PT, and d-dimer non VTE slightly elevated; not suggestive of coagulation abnormalities.     #pancytopenia  :Workup of acquired  pancytopenia includes malignancies (acute leukemia, chronic leukemia, myelodysplastic syndrome, MM), immune suppression due to cytotoxic drugs, autoimmune conditions, marrow suppression due to infectious etiologies, or consumption due to microangiopathy.   Repeat CBC indicates WBC, plt, and Hb counts that are much more consistent with patient's recent baseline over the past few months. Therefore this is likely consistent with patient's chronic pancytopenia over the past few months.  Retic index = 0.2, indicative of hypoproliferation potentially due to a monoclonal process causing BM crowding.   - Heme/onc recs from 10/19: no urgent BMB indicated, daily CBC with diff, daily LDH, hapto, coags, fibrinogen  - schedule outpatient BMB next week  - transfusion to Hb 7  - Plt transfusion only needed to 10 (patient is not actively bleeding)  - if becomes febrile, start on zosyn for febrile neutropenia  - ordered hemolysis/DIC labs, coag studies  - ordered parvovirus  - SPEP/free kappa lambda ordered to workup possible multiple myeloma     #abdominal pain  :Chronic, unclear etiology. Mesenteric edema on CT is new compared to last CT done 10/6. Pain is potentially representative of bone pain given that he has spinal pain on exam. This etiology combined with pancytopenia could be suggestive of plasma cell dyscrasia.  - antihistamines can alleviate G-CSF induced bone pain, loratadine 10mg ordered as a trial to determine if this alleviates patient's pain  - pain control: scheduled tylenol 975 mg, PO dilaudid (1mg moderate pain, 2 mg severe), lidocaine patch back     #transplant  - transplant neph consulted, rec'd hold azathioprine. C/w tacrolimus, prednisone  - last tacro level done in ED 10, within therapeutic range    #HTN, chronic  - resume home BP meds coreg, amlodipine, losartan              MARRY RODRIGUEZ, MS-3

## 2023-10-20 NOTE — DISCHARGE INSTRUCTIONS
Mr. Bashir,    It was a pleasure caring for you. You were admitted for worsening abdominal pain and were found to have low blood counts. We did some additional blood work to understand why you are having these low blood counts and also had our hematology/oncology team evaluate you. You were also seen by the kidney specialists while you were here and some adjustments were made to your kidney transplant medications.     Please start taking the medications below:   - Cinacalcet 30 mg tablet daily   - Dilaudid tablets for pain   - Claritin 10 mg tablets daily - to help with bone pain     Please change how you take the following medications:   - Azathioprine (Imuran) - take 25 mg (half a tablet) once daily   - Tacrolimus (progaf) - take 2.5 mg twice daily   - Prednisone (deltasone) - take 10 mg (2 tablets) once daily    We will be referring you to our specialists to get a tissue sample obtained from your bone marrow (bone marrow biopsy). We also encourage you to make a call to get it scheduled.     It was a pleasure taking care of you. We wish you the best.   Your  Care Team

## 2023-10-20 NOTE — CONSULTS
Reason For Consult  History of renal transplant     History Of Present Illness  aMnolo Bashir is a 68 y.o. male with PMH ESRD s/p renal transplant (1992, 2013), HTN, DM2, prostate Ca s/p radical prostatectomy, HCV (treated) who presented for further evaluation of pancytopenia and generalized body pains. Transplant nephrology consulted for immunosuppression management.     Mr Bashir had ESRD due to HTN. He had first renal transplant in 1994, failed in 2006, had 2nd DDKT in 2013. His baseline Cr at present is around 2.5-3.0. He has not had prior renal transplant biopsy. Last seen in transplant clinic on 09/07/2023.    Now presents with generalized abdominal pains and pancytopenia for further evaluation.         Past Medical History  He has a past medical history of Chronic kidney disease, stage 3 unspecified (CMS/HCC) (09/26/2018), COVID-19 (06/18/2020), Diabetes (CMS/HCC), HTN (hypertension), Other long term (current) drug therapy (07/20/2021), Personal history of other diseases of the circulatory system, Personal history of other infectious and parasitic diseases (08/17/2015), Polyp, colonic (08/17/2023), Primary osteoarthritis of both ankles (08/17/2023), Tubular adenoma of colon (08/17/2023), and Unspecified kidney failure (08/17/2016).    Surgical History  He has a past surgical history that includes Prostatectomy (10/11/2013); Ileostomy (04/25/2017); Other surgical history (04/21/2017); Ileostomy closure (08/17/2015); Other surgical history (08/17/2015); Other surgical history (08/17/2015); US guided percutaneous peritoneal or retroperitoneal fluid collection drainage (10/20/2022); transplant, kidney, open (1992); and transplant, kidney, open (2013).     Social History  He reports that he has never smoked. He has never used smokeless tobacco. He reports current alcohol use. He reports current drug use. Drug: Oxycodone.    Family History  Family History   Problem Relation Name Age of Onset    Bone cancer Mother       Other (corona's sarcome of the bone marrow) Mother      Prostate cancer Father      Diabetes Other Family Hist     Hypertension Other Family Hist         Allergies  Patient has no known allergies.    Review of Systems   ROS is otherwise negative unless stated in HPI      Physical Exam  General appearance: No distress  Eyes: Non-icteric  HEENT: atrumatic head, PEERLA, moist mucosa  Skin: no apparent rash  Heart: NSR, S1, S2 normal, no murmur or gallop  Lungs: Symmetrical expansion,CTA bilat no wheezing/crackles  Abdomen: soft, nt/nd  Extremities: no edema bilat  Neuro: No FND,asterixis         I&O 24HR    Intake/Output Summary (Last 24 hours) at 10/20/2023 1546  Last data filed at 10/20/2023 1100  Gross per 24 hour   Intake 440 ml   Output --   Net 440 ml       Vitals 24HR  Heart Rate:  [66-94]   Temp:  [36.4 °C (97.5 °F)-36.9 °C (98.4 °F)]   Resp:  [18-19]   BP: (144-193)/(69-95)   SpO2:  [94 %-99 %]     Relevant Results  Na 138          WBC 2.6   K4.5               HGB 8.2  Cl 107            PLT 92   HCO3 25   BUN 32  Cr 2.63      Assessment/Plan   Manolo Bashir is a 68 y.o. male with PMH ESRD s/p renal transplant (1992, 2013), HTN, DM2, prostate Ca s/p radical prostatectomy, HCV (treated) who presented for further evaluation of pancytopenia and generalized body pains. Transplant nephrology consulted for immunosuppression management.     Mr Bashir had ESRD due to HTN. He had first renal transplant in 1994, failed in 2006, had 2nd DDKT in 2013. His baseline Cr at present is around 2.5-3.0. He has not had prior renal transplant biopsy. Last seen in transplant clinic on 09/07/2023.    Now presents with generalized abdominal pains and pancytopenia for further evaluation.    #Pancytopenia for evaluation  -Workup as per primary team     #Allograft function  -Cr at baseline  -Avoid nephrotoxic medications  -Avoid hypotension  -I and O charting   -Will follow     #Immunosuppression  -Hold Azathioprine for now, resume at  lower dose on discharge  (home dose 50mg once daily)    UPDATE 4:51pm  Informed by primary team that patient is to be discharged home today  -Decrease Tacrolimus 2.5mg po bid   -Increase Prednisone 10mg po once daily   -Decrease Azathioprine 25mg po once daily     #Volume/HtN  -Euvolemic, but hypertensive   -Resume home medications:  -Carvedilol 25mg po bid   -Hydralazine 25mg po tid   -Losartan 25mg po once daily  -Agree with starting Amlodipine     #Electrolytes  -K 4.5, Na 138     #Acid base  -HCO3 25     #CKD-Anemia  -HGB 8.2  -Check ferritin  -Workup in progress    #CKD MBD  -Cinacalcet 30mg po once daily   -Vit D-3 25mcg (1000 units) 2 capsules daily     SW Dr Dina Kidd MD  Nephrology fellow PGY4   Transplant pager [phone]

## 2023-10-20 NOTE — PROGRESS NOTES
Met with pt for assessment.  Confirmed address and contact info to be correct.   Pt lives at home with his daughter in a single family home.  He is diabetic but does not need any supplies at this time.  He stated he has a kidney doctor at us and want to get a PCP here too.  He gets his meds from [facility].  He is independent with his ADLS/IDLD his daughter provides transport to his appointments.  He currently has no issues for SW to address.  TCC will follow pt with discharge planning.  Stefania Arciniega MSW, PRIETO.

## 2023-10-20 NOTE — CARE PLAN
Alert and oriented x4. Patient with discharge orders. Reviewed medication changes with patient. Meds to bed delivered to patient. PIV removed. Patient aware to take prograf dose tonight at home. All belongings with patient. Patient discharged    Problem: Pain  Goal: Takes deep breaths with improved pain control throughout the shift  Outcome: Not Progressing  Goal: Turns in bed with improved pain control throughout the shift  Outcome: Not Progressing  Goal: Walks with improved pain control throughout the shift  Outcome: Not Progressing  Goal: Performs ADL's with improved pain control throughout shift  Outcome: Not Progressing  Goal: Participates in PT with improved pain control throughout the shift  Outcome: Not Progressing  Goal: Free from opioid side effects throughout the shift  Outcome: Not Progressing  Goal: Free from acute confusion related to pain meds throughout the shift  Outcome: Not Progressing

## 2023-10-20 NOTE — CARE PLAN
Problem: Pain  Goal: Takes deep breaths with improved pain control throughout the shift  Outcome: Progressing  Goal: Turns in bed with improved pain control throughout the shift  Outcome: Progressing  Goal: Walks with improved pain control throughout the shift  Outcome: Progressing  Goal: Performs ADL's with improved pain control throughout shift  Outcome: Progressing  Goal: Participates in PT with improved pain control throughout the shift  Outcome: Progressing  Goal: Free from opioid side effects throughout the shift  Outcome: Progressing  Goal: Free from acute confusion related to pain meds throughout the shift  Outcome: Progressing   The patient's goals for the shift include      The clinical goals for the shift include  decreasing back pain

## 2023-10-21 LAB
B19V IGG SER IA-ACNC: 0.41 IV
B19V IGM SER IA-ACNC: 0.1 IV
METHYLMALONATE SERPL-SCNC: 0.52 UMOL/L (ref 0–0.4)

## 2023-10-22 LAB
KAPPA LC SERPL-MCNC: 8.23 MG/DL (ref 0.33–1.94)
KAPPA LC/LAMBDA SER: 1.29 {RATIO} (ref 0.26–1.65)
LAMBDA LC SERPL-MCNC: 6.4 MG/DL (ref 0.57–2.63)

## 2023-10-23 ENCOUNTER — TELEPHONE (OUTPATIENT)
Dept: ADMISSION | Facility: HOSPITAL | Age: 68
End: 2023-10-23
Payer: COMMERCIAL

## 2023-10-23 LAB
BACTERIA BLD CULT: NORMAL
BACTERIA BLD CULT: NORMAL
PATH REVIEW-CBC DIFFERENTIAL: NORMAL
SCAN RESULT: ABNORMAL
SCAN RESULT: NORMAL

## 2023-10-24 LAB
ALBUMIN: 3.2 G/DL (ref 3.4–5)
ALPHA 1 GLOBULIN: 0.2 G/DL (ref 0.2–0.6)
ALPHA 2 GLOBULIN: 0.5 G/DL (ref 0.4–1.1)
BETA GLOBULIN: 0.9 G/DL (ref 0.5–1.2)
GAMMA GLOBULIN: 0.9 G/DL (ref 0.5–1.4)
IMMUNOFIXATION COMMENT: ABNORMAL
M-PROTEIN 1: 0.2 G/DL
M-PROTEIN 2: 0.1 G/DL
M-PROTEIN 3: 0.1 G/DL
PATH REVIEW - SERUM IMMUNOFIXATION: ABNORMAL
PATH REVIEW-SERUM PROTEIN ELECTROPHORESIS: ABNORMAL
PROTEIN ELECTROPHORESIS COMMENT: ABNORMAL

## 2023-10-25 ENCOUNTER — OFFICE VISIT (OUTPATIENT)
Dept: ENDOCRINOLOGY | Facility: CLINIC | Age: 68
End: 2023-10-25
Payer: COMMERCIAL

## 2023-10-25 VITALS
WEIGHT: 159 LBS | SYSTOLIC BLOOD PRESSURE: 151 MMHG | HEIGHT: 68 IN | HEART RATE: 72 BPM | DIASTOLIC BLOOD PRESSURE: 74 MMHG | BODY MASS INDEX: 24.1 KG/M2

## 2023-10-25 DIAGNOSIS — E10.9 TYPE 1 DIABETES MELLITUS WITHOUT COMPLICATION (MULTI): Primary | ICD-10-CM

## 2023-10-25 DIAGNOSIS — E10.9 TYPE 1 DIABETES MELLITUS WITHOUT COMPLICATION (MULTI): ICD-10-CM

## 2023-10-25 DIAGNOSIS — E11.9 DIABETES MELLITUS TYPE 2 WITHOUT RETINOPATHY (MULTI): Primary | ICD-10-CM

## 2023-10-25 LAB — POC FINGERSTICK BLOOD GLUCOSE: 348 MG/DL (ref 70–100)

## 2023-10-25 PROCEDURE — 3066F NEPHROPATHY DOC TX: CPT | Performed by: STUDENT IN AN ORGANIZED HEALTH CARE EDUCATION/TRAINING PROGRAM

## 2023-10-25 PROCEDURE — 1111F DSCHRG MED/CURRENT MED MERGE: CPT | Performed by: STUDENT IN AN ORGANIZED HEALTH CARE EDUCATION/TRAINING PROGRAM

## 2023-10-25 PROCEDURE — 4010F ACE/ARB THERAPY RXD/TAKEN: CPT | Performed by: STUDENT IN AN ORGANIZED HEALTH CARE EDUCATION/TRAINING PROGRAM

## 2023-10-25 PROCEDURE — 3044F HG A1C LEVEL LT 7.0%: CPT | Performed by: STUDENT IN AN ORGANIZED HEALTH CARE EDUCATION/TRAINING PROGRAM

## 2023-10-25 PROCEDURE — 3052F HG A1C>EQUAL 8.0%<EQUAL 9.0%: CPT | Performed by: STUDENT IN AN ORGANIZED HEALTH CARE EDUCATION/TRAINING PROGRAM

## 2023-10-25 PROCEDURE — 3077F SYST BP >= 140 MM HG: CPT | Performed by: STUDENT IN AN ORGANIZED HEALTH CARE EDUCATION/TRAINING PROGRAM

## 2023-10-25 PROCEDURE — 99213 OFFICE O/P EST LOW 20 MIN: CPT | Performed by: STUDENT IN AN ORGANIZED HEALTH CARE EDUCATION/TRAINING PROGRAM

## 2023-10-25 PROCEDURE — 3008F BODY MASS INDEX DOCD: CPT | Performed by: STUDENT IN AN ORGANIZED HEALTH CARE EDUCATION/TRAINING PROGRAM

## 2023-10-25 PROCEDURE — 1125F AMNT PAIN NOTED PAIN PRSNT: CPT | Performed by: STUDENT IN AN ORGANIZED HEALTH CARE EDUCATION/TRAINING PROGRAM

## 2023-10-25 PROCEDURE — 1036F TOBACCO NON-USER: CPT | Performed by: STUDENT IN AN ORGANIZED HEALTH CARE EDUCATION/TRAINING PROGRAM

## 2023-10-25 PROCEDURE — 82962 GLUCOSE BLOOD TEST: CPT | Performed by: STUDENT IN AN ORGANIZED HEALTH CARE EDUCATION/TRAINING PROGRAM

## 2023-10-25 PROCEDURE — 1159F MED LIST DOCD IN RCRD: CPT | Performed by: STUDENT IN AN ORGANIZED HEALTH CARE EDUCATION/TRAINING PROGRAM

## 2023-10-25 PROCEDURE — 3048F LDL-C <100 MG/DL: CPT | Performed by: STUDENT IN AN ORGANIZED HEALTH CARE EDUCATION/TRAINING PROGRAM

## 2023-10-25 PROCEDURE — 3078F DIAST BP <80 MM HG: CPT | Performed by: STUDENT IN AN ORGANIZED HEALTH CARE EDUCATION/TRAINING PROGRAM

## 2023-10-25 RX ORDER — INSULIN GLARGINE 100 [IU]/ML
24 INJECTION, SOLUTION SUBCUTANEOUS DAILY
Qty: 12 ML | Refills: 2 | Status: SHIPPED | OUTPATIENT
Start: 2023-10-25 | End: 2023-12-21 | Stop reason: HOSPADM

## 2023-10-25 RX ORDER — INSULIN LISPRO 100 [IU]/ML
INJECTION, SOLUTION INTRAVENOUS; SUBCUTANEOUS
Qty: 6 ML | Refills: 3 | Status: SHIPPED | OUTPATIENT
Start: 2023-10-25 | End: 2023-12-21 | Stop reason: HOSPADM

## 2023-10-25 RX ORDER — BLOOD SUGAR DIAGNOSTIC
100 STRIP MISCELLANEOUS
Qty: 3 EACH | Refills: 3 | Status: SHIPPED | OUTPATIENT
Start: 2023-10-25 | End: 2023-12-21 | Stop reason: HOSPADM

## 2023-10-25 NOTE — PATIENT INSTRUCTIONS
Call our office when you're ready for trulicity     Estella Quinonez MD  Divison of Endocrinology   [facility]   Phone: [phone]    option 4, then option 1  Fax: [phone]

## 2023-10-25 NOTE — PROGRESS NOTES
68 M PMH: HLD, prostate CA, cholecystectomy, goiter with thyroid nodules, s/p kidney transplant     Interval: had cholecystectomy so was having nasuea poor PO intake     Diabetes History     DM diagnosed 13 years  ago, post transplant kidney 2/2 HTN   On chronic prednisone 5mg   Complications Micro and Macro-with residual CKD   A1c:   Lab Results   Component Value Date    HGBA1C 8.4 (A) 07/19/2023       Regimen   Lantus 24  Insulin sliding scale with meals   Trulicity-was not started   Previously: glipizide    On prednisone 5mg daily    SMBG   Did not bring meter today  Hypoglycemia none known    Diet: starting to go back to usual    Comorbidities and Screening  Eye Exam:   Foot exam     Lipid  Lab Results   Component Value Date    LDLF 61 03/17/2022    TRIG 110 03/17/2022         Statin- pravastatin 40   Cr and albuminuria-   Lab Results   Component Value Date    CREATININE 2.63 (H) 10/20/2023    EGFR 26 (L) 10/20/2023      ACE/ARB- losartan 25mg tab    Thyroid nodule:   Thyroid ultrasound in 2022 -homogenous with no suspicious nodules, there are multiple cervical lymph nodes characterized as reactive and benign appearing  He has ultrasounds as far back as 2019     Past Medical History:   Diagnosis Date    Chronic kidney disease, stage 3 unspecified (CMS/HCC) 09/26/2018    Stage 3 chronic kidney disease    COVID-19 06/18/2020    COVID-19 virus infection    Diabetes (CMS/HCC)     HTN (hypertension)     Other long term (current) drug therapy 07/20/2021    High risk medication use    Personal history of other diseases of the circulatory system     Personal history of cardiac murmur    Personal history of other infectious and parasitic diseases 08/17/2015    History of hepatitis    Polyp, colonic 08/17/2023    Primary osteoarthritis of both ankles 08/17/2023    Tubular adenoma of colon 08/17/2023    Unspecified kidney failure 08/17/2016    Renal failure     Family History   Problem Relation Name Age of Onset    Bone  cancer Mother      Other (corona's sarcome of the bone marrow) Mother      Prostate cancer Father      Diabetes Other Family Hist     Hypertension Other Family Hist       Social History     Socioeconomic History    Marital status: Single     Spouse name: Not on file    Number of children: Not on file    Years of education: Not on file    Highest education level: Not on file   Occupational History    Not on file   Tobacco Use    Smoking status: Never    Smokeless tobacco: Never   Vaping Use    Vaping Use: Never used   Substance and Sexual Activity    Alcohol use: Yes    Drug use: Yes     Types: Oxycodone    Sexual activity: Not on file   Other Topics Concern    Not on file   Social History Narrative    Not on file     Social Determinants of Health     Financial Resource Strain: Low Risk  (10/20/2023)    Overall Financial Resource Strain (CARDIA)     Difficulty of Paying Living Expenses: Not hard at all   Food Insecurity: No Food Insecurity (10/3/2023)    Hunger Vital Sign     Worried About Running Out of Food in the Last Year: Never true     Ran Out of Food in the Last Year: Never true   Transportation Needs: No Transportation Needs (10/20/2023)    PRAPARE - Transportation     Lack of Transportation (Medical): No     Lack of Transportation (Non-Medical): No   Physical Activity: Unknown (10/3/2023)    Exercise Vital Sign     Days of Exercise per Week: 2 days     Minutes of Exercise per Session: Patient refused   Recent Concern: Physical Activity - Insufficiently Active (10/3/2023)    Exercise Vital Sign     Days of Exercise per Week: 2 days     Minutes of Exercise per Session: 30 min   Stress: No Stress Concern Present (10/3/2023)    [employer] [place] [employer] - Occupational Stress Questionnaire     Feeling of Stress : Not at all   Social Connections: Unknown (10/3/2023)    Social Connection and Isolation Panel [NHANES]     Frequency of Communication with Friends and Family: More than three times a week      Frequency of Social Gatherings with Friends and Family: Twice a week     Attends Episcopal Services: Patient refused     Active Member of Clubs or Organizations: Patient refused     Attends Club or Organization Meetings: Patient refused     Marital Status: Never    Intimate Partner Violence: Not At Risk (10/3/2023)    Humiliation, Afraid, Rape, and Kick questionnaire     Fear of Current or Ex-Partner: No     Emotionally Abused: No     Physically Abused: No     Sexually Abused: No   Housing Stability: Low Risk  (10/20/2023)    Housing Stability Vital Sign     Unable to Pay for Housing in the Last Year: No     Number of Places Lived in the Last Year: 1     Unstable Housing in the Last Year: No        ROS:  Appetite improving  Weight loss   Back pain  Negative except those noted in current and interim history    Physical Exam  Constitutional:       Appearance: Normal appearance.   Cardiovascular:      Rate and Rhythm: Normal rate and regular rhythm.   Abdominal:      Palpations: Abdomen is soft.      Comments: Surgical scar.  Soft non tender, no lipodystrophy   Musculoskeletal:         General: No swelling or tenderness.   Skin:     General: Skin is warm and dry.   Neurological:      General: No focal deficit present.      Mental Status: He is alert and oriented to person, place, and time.      Comments: forgetful          labs and imaging reviewed, pertinent findings listed on HPI and Impression      Problem List Items Addressed This Visit       Diabetes mellitus type 2 without retinopathy (CMS/HCC)    Relevant Medications    OneTouch Ultra Test strip    insulin glargine (Lantus) 100 unit/mL injection    insulin lispro (HumaLOG) 100 unit/mL injection    Other Relevant Orders    Albumin , Urine Random    Lipid Panel    Renal Function Panel    Hemoglobin A1C    Lipid panel     Other Visit Diagnoses       Type 1 diabetes mellitus without complication (CMS/HCC)    -  Primary    Relevant Orders    POCT glucose  manually resulted (Completed)        1) DM2  2) goiter with thyroid nodules     Hold off on SGLT2 for theoretical risk of infection as he is being worked up for pancytopenia   Due for diabetes screening labs     Discuss risks and side effects of medication, no contraindication to GLP1 RA including: gastroparesis, gallbladder disease, pancreatitis, unstable retinopathy or hx of Medullary thyroid CA  We discussed trulicity, states he wants to hold off since he is about to get a bone biopsy and once he feels back to his baseline he will decide regarding med:    If starting, then reduce glargine to 17 units daily, lispro sliding scale 1:40 >200  Does not want CGM  Diabetes screening labs    Advised regarding eye exam     Foot exam next visit     Ff up 3-4 months    Time spent  Coordination of care and Plan communicated to PCP electronically via EMR  Total time spent 29 min in this encounter including chart review, coordination of care, history physical exam, counseling and placing lab orders

## 2023-10-26 ENCOUNTER — PROCEDURE VISIT (OUTPATIENT)
Dept: OTHER | Facility: HOSPITAL | Age: 68
End: 2023-10-26
Payer: COMMERCIAL

## 2023-10-26 ENCOUNTER — LAB (OUTPATIENT)
Dept: LAB | Facility: HOSPITAL | Age: 68
End: 2023-10-26
Payer: COMMERCIAL

## 2023-10-26 VITALS
TEMPERATURE: 97.5 F | BODY MASS INDEX: 24.14 KG/M2 | DIASTOLIC BLOOD PRESSURE: 79 MMHG | SYSTOLIC BLOOD PRESSURE: 169 MMHG | OXYGEN SATURATION: 100 % | HEART RATE: 66 BPM | WEIGHT: 158.73 LBS | RESPIRATION RATE: 18 BRPM

## 2023-10-26 DIAGNOSIS — D61.818 PANCYTOPENIA (MULTI): Primary | ICD-10-CM

## 2023-10-26 DIAGNOSIS — E11.9 TYPE 2 DIABETES MELLITUS WITHOUT COMPLICATION, WITH LONG-TERM CURRENT USE OF INSULIN (MULTI): ICD-10-CM

## 2023-10-26 DIAGNOSIS — Z79.4 TYPE 2 DIABETES MELLITUS WITHOUT COMPLICATION, WITH LONG-TERM CURRENT USE OF INSULIN (MULTI): ICD-10-CM

## 2023-10-26 DIAGNOSIS — D61.818 PANCYTOPENIA (MULTI): ICD-10-CM

## 2023-10-26 DIAGNOSIS — E11.9 DIABETES MELLITUS TYPE 2 WITHOUT RETINOPATHY (MULTI): ICD-10-CM

## 2023-10-26 LAB
ALBUMIN SERPL BCP-MCNC: 3.2 G/DL (ref 3.4–5)
ANION GAP SERPL CALC-SCNC: 12 MMOL/L (ref 10–20)
BASOPHILS # BLD AUTO: 0.01 X10*3/UL (ref 0–0.1)
BASOPHILS NFR BLD AUTO: 0.3 %
BUN SERPL-MCNC: 37 MG/DL (ref 6–23)
CALCIUM SERPL-MCNC: 10.2 MG/DL (ref 8.6–10.6)
CHLORIDE SERPL-SCNC: 108 MMOL/L (ref 98–107)
CHOLEST SERPL-MCNC: 99 MG/DL (ref 0–199)
CHOLESTEROL/HDL RATIO: 2
CO2 SERPL-SCNC: 26 MMOL/L (ref 21–32)
CREAT SERPL-MCNC: 2.88 MG/DL (ref 0.5–1.3)
EOSINOPHIL # BLD AUTO: 0.03 X10*3/UL (ref 0–0.7)
EOSINOPHIL NFR BLD AUTO: 0.8 %
ERYTHROCYTE [DISTWIDTH] IN BLOOD BY AUTOMATED COUNT: 15.3 % (ref 11.5–14.5)
EST. AVERAGE GLUCOSE BLD GHB EST-MCNC: 126 MG/DL
GFR SERPL CREATININE-BSD FRML MDRD: 23 ML/MIN/1.73M*2
GLUCOSE SERPL-MCNC: 273 MG/DL (ref 74–99)
HBA1C MFR BLD: 6 %
HCT VFR BLD AUTO: 24.6 % (ref 41–52)
HDLC SERPL-MCNC: 48.6 MG/DL
HGB BLD-MCNC: 8.3 G/DL (ref 13.5–17.5)
HOLD SPECIMEN: NORMAL
IMM GRANULOCYTES # BLD AUTO: 0.02 X10*3/UL (ref 0–0.7)
IMM GRANULOCYTES NFR BLD AUTO: 0.5 % (ref 0–0.9)
LDLC SERPL CALC-MCNC: 41 MG/DL
LYMPHOCYTES # BLD AUTO: 0.54 X10*3/UL (ref 1.2–4.8)
LYMPHOCYTES NFR BLD AUTO: 13.8 %
MCH RBC QN AUTO: 28.2 PG (ref 26–34)
MCHC RBC AUTO-ENTMCNC: 33.7 G/DL (ref 32–36)
MCV RBC AUTO: 84 FL (ref 80–100)
MONOCYTES # BLD AUTO: 0.21 X10*3/UL (ref 0.1–1)
MONOCYTES NFR BLD AUTO: 5.4 %
NEUTROPHILS # BLD AUTO: 3.11 X10*3/UL (ref 1.2–7.7)
NEUTROPHILS NFR BLD AUTO: 79.2 %
NON HDL CHOLESTEROL: 50 MG/DL (ref 0–149)
NRBC BLD-RTO: 0 /100 WBCS (ref 0–0)
PHOSPHATE SERPL-MCNC: 2.8 MG/DL (ref 2.5–4.9)
PLATELET # BLD AUTO: 126 X10*3/UL (ref 150–450)
PMV BLD AUTO: 10.7 FL (ref 7.5–11.5)
POTASSIUM SERPL-SCNC: 4.8 MMOL/L (ref 3.5–5.3)
RBC # BLD AUTO: 2.94 X10*6/UL (ref 4.5–5.9)
SODIUM SERPL-SCNC: 141 MMOL/L (ref 136–145)
TRIGL SERPL-MCNC: 46 MG/DL (ref 0–149)
VLDL: 9 MG/DL (ref 0–40)
WBC # BLD AUTO: 3.9 X10*3/UL (ref 4.4–11.3)

## 2023-10-26 PROCEDURE — 88189 FLOWCYTOMETRY/READ 16 & >: CPT | Performed by: PATHOLOGY

## 2023-10-26 PROCEDURE — 85025 COMPLETE CBC W/AUTO DIFF WBC: CPT

## 2023-10-26 PROCEDURE — 88185 FLOWCYTOMETRY/TC ADD-ON: CPT | Mod: TC

## 2023-10-26 PROCEDURE — 83036 HEMOGLOBIN GLYCOSYLATED A1C: CPT

## 2023-10-26 PROCEDURE — 80069 RENAL FUNCTION PANEL: CPT

## 2023-10-26 PROCEDURE — 88291 CYTO/MOLECULAR REPORT: CPT | Performed by: PATHOLOGY

## 2023-10-26 PROCEDURE — 88342 IMHCHEM/IMCYTCHM 1ST ANTB: CPT | Performed by: PATHOLOGY

## 2023-10-26 PROCEDURE — 36415 COLL VENOUS BLD VENIPUNCTURE: CPT

## 2023-10-26 PROCEDURE — 88187 FLOWCYTOMETRY/READ 2-8: CPT | Performed by: PATHOLOGY

## 2023-10-26 PROCEDURE — 88271 CYTOGENETICS DNA PROBE: CPT | Mod: 59

## 2023-10-26 PROCEDURE — 88341 IMHCHEM/IMCYTCHM EA ADD ANTB: CPT | Mod: TC

## 2023-10-26 PROCEDURE — 88341 IMHCHEM/IMCYTCHM EA ADD ANTB: CPT | Performed by: PATHOLOGY

## 2023-10-26 PROCEDURE — 80061 LIPID PANEL: CPT

## 2023-10-26 PROCEDURE — 85097 BONE MARROW INTERPRETATION: CPT | Mod: TC

## 2023-10-26 PROCEDURE — 38222 DX BONE MARROW BX & ASPIR: CPT | Performed by: STUDENT IN AN ORGANIZED HEALTH CARE EDUCATION/TRAINING PROGRAM

## 2023-10-26 PROCEDURE — 88365 INSITU HYBRIDIZATION (FISH): CPT | Performed by: PATHOLOGY

## 2023-10-26 PROCEDURE — 88280 CHROMOSOME KARYOTYPE STUDY: CPT

## 2023-10-26 PROCEDURE — 88305 TISSUE EXAM BY PATHOLOGIST: CPT | Performed by: PATHOLOGY

## 2023-10-26 PROCEDURE — 88311 DECALCIFY TISSUE: CPT | Performed by: PATHOLOGY

## 2023-10-26 PROCEDURE — 88341 IMHCHEM/IMCYTCHM EA ADD ANTB: CPT | Mod: TC,SUR

## 2023-10-26 PROCEDURE — 88188 FLOWCYTOMETRY/READ 9-15: CPT | Performed by: PATHOLOGY

## 2023-10-26 RX ORDER — LANCETS
EACH MISCELLANEOUS 3 TIMES DAILY
Qty: 100 EACH | Refills: 3 | Status: SHIPPED | OUTPATIENT
Start: 2023-10-26 | End: 2023-11-16 | Stop reason: WASHOUT

## 2023-10-26 RX ORDER — LANCETS
1 EACH MISCELLANEOUS 3 TIMES DAILY
COMMUNITY
Start: 2023-10-25 | End: 2023-10-26 | Stop reason: SDUPTHER

## 2023-10-26 NOTE — PROGRESS NOTES
Bone Marrow Biopsy and Aspiration Procedure Note     Informed consent was obtained and potential risks including bleeding, infection and pain were reviewed with the patient.     The left posterior iliac crest was prepped with chlorhexidine.     10 ml of lidocaine 1% and 5 ml of lidocaine 2% local anesthesia infiltrated into the subcutaneous tissue.    Left bone marrow biopsy and left bone marrow aspirate was obtained.     The procedure was tolerated well and there were no complications. Home instructions were provided.     Specimens sent for: routine histopathologic stains and sectioning, flow cytometry, cytogenetics, and molecular analysis    Procedure completed by: Roldan Welsh PA-C

## 2023-10-26 NOTE — PROGRESS NOTES
Patient arrived to ASCT unit via self-ambulation for bone marrow biopsy. He is alert and oriented x3, denies pain or any discomfort. Vital signs obtained. Blood drawn in [facility] lab prior to arrival. Roldan VALLEJO performed procedure. Dressing is clean, dry and intact. Education provided and PI sheet given. No adverse reactions, no complaints and no assistance needed.

## 2023-10-31 LAB
CELL COUNT (BLOOD): 18.23 X10*3/UL
CELL POPULATIONS: NORMAL
DIAGNOSIS: NORMAL
FLOW DIFFERENTIAL: NORMAL
FLOW TEST ORDERED: NORMAL
LAB TEST METHOD: NORMAL
NUMBER OF CELLS COLLECTED: NORMAL
PATH REPORT.COMMENTS IMP SPEC: NORMAL
PATH REPORT.FINAL DX SPEC: NORMAL
PATH REPORT.GROSS SPEC: NORMAL
PATH REPORT.MICROSCOPIC SPEC OTHER STN: NORMAL
PATH REPORT.RELEVANT HX SPEC: NORMAL
PATH REPORT.RELEVANT HX SPEC: NORMAL
PATH REPORT.TOTAL CANCER: NORMAL
PATH REPORT.TOTAL CANCER: NORMAL
SIGNATURE COMMENT: NORMAL
SPECIMEN VIABILITY: NORMAL

## 2023-11-07 ENCOUNTER — OFFICE VISIT (OUTPATIENT)
Dept: HEMATOLOGY/ONCOLOGY | Facility: HOSPITAL | Age: 68
End: 2023-11-07
Payer: COMMERCIAL

## 2023-11-07 VITALS
RESPIRATION RATE: 18 BRPM | DIASTOLIC BLOOD PRESSURE: 75 MMHG | BODY MASS INDEX: 25.31 KG/M2 | HEART RATE: 66 BPM | SYSTOLIC BLOOD PRESSURE: 159 MMHG | OXYGEN SATURATION: 99 % | TEMPERATURE: 97.3 F | WEIGHT: 166.45 LBS

## 2023-11-07 DIAGNOSIS — D61.818 PANCYTOPENIA (MULTI): ICD-10-CM

## 2023-11-07 DIAGNOSIS — D47.2 MGUS (MONOCLONAL GAMMOPATHY OF UNKNOWN SIGNIFICANCE): Primary | ICD-10-CM

## 2023-11-07 LAB
CHROM ANALY OVERALL INTERP-IMP: NORMAL
ELECTRONICALLY COSIGNED BY CYTOGENETICS: NORMAL
ELECTRONICALLY SIGNED BY CYTOGENETICS: NORMAL
STRUCT VAR ISCN NAME: NORMAL

## 2023-11-07 PROCEDURE — 4010F ACE/ARB THERAPY RXD/TAKEN: CPT | Performed by: INTERNAL MEDICINE

## 2023-11-07 PROCEDURE — 1159F MED LIST DOCD IN RCRD: CPT | Performed by: INTERNAL MEDICINE

## 2023-11-07 PROCEDURE — 3008F BODY MASS INDEX DOCD: CPT | Performed by: INTERNAL MEDICINE

## 2023-11-07 PROCEDURE — 3078F DIAST BP <80 MM HG: CPT | Performed by: INTERNAL MEDICINE

## 2023-11-07 PROCEDURE — 1111F DSCHRG MED/CURRENT MED MERGE: CPT | Performed by: INTERNAL MEDICINE

## 2023-11-07 PROCEDURE — 1125F AMNT PAIN NOTED PAIN PRSNT: CPT | Performed by: INTERNAL MEDICINE

## 2023-11-07 PROCEDURE — 99215 OFFICE O/P EST HI 40 MIN: CPT | Performed by: INTERNAL MEDICINE

## 2023-11-07 PROCEDURE — 3066F NEPHROPATHY DOC TX: CPT | Performed by: INTERNAL MEDICINE

## 2023-11-07 PROCEDURE — 3044F HG A1C LEVEL LT 7.0%: CPT | Performed by: INTERNAL MEDICINE

## 2023-11-07 PROCEDURE — 3077F SYST BP >= 140 MM HG: CPT | Performed by: INTERNAL MEDICINE

## 2023-11-07 PROCEDURE — 3048F LDL-C <100 MG/DL: CPT | Performed by: INTERNAL MEDICINE

## 2023-11-07 PROCEDURE — 1036F TOBACCO NON-USER: CPT | Performed by: INTERNAL MEDICINE

## 2023-11-07 ASSESSMENT — PAIN SCALES - GENERAL: PAINLEVEL: 10-WORST PAIN EVER

## 2023-11-07 NOTE — PROGRESS NOTES
Patient ID: Manolo Bashir is a 68 y.o. male.    Assessment/Plan        Pancytopenia (CMS/HCC)  Reviewed results of BM which demonstrate potential plasma cell disorder in setting of known MGUS.  While his counts have improved somewhat, would recommend completion of myeloma evaluation.  Whether he meets criteria for multiple myeloma may be somewhat difficult to ascertain given concurrent kidney issues, but will plan for PET scan, 24 hr urine, and beta-2 microglobulin.  Plan for follow up with our MGUS/myeloma team to review results and make any potential plans.    ______________________________________________________________________________________________________________________________________________    Here today for planned follow up.  Abdominal pain has been better but still no clear etiology.  No new health issues.  Denies current fevers, chills, nausea, vomiting, diarrhea, dyspnea, rash, and pain.      Objective    BSA: 1.9 meters squared  /75   Pulse 66   Temp 36.3 °C (97.3 °F)   Resp 18   Wt 75.5 kg (166 lb 7.2 oz)   SpO2 99%   BMI 25.31 kg/m²      Physical Exam  Vitals reviewed.   Constitutional:       Appearance: Normal appearance.   Eyes:      Conjunctiva/sclera: Conjunctivae normal.      Pupils: Pupils are equal, round, and reactive to light.   Skin:     General: Skin is warm and dry.      Findings: No rash.   Psychiatric:         Mood and Affect: Mood normal.         Time Spent  Prep time on day of patient encounter: 8 minutes  Time spent directly with patient, family or caregiver: 23 minutes  Additional Time Spent on Patient Care Activities: 5 minutes  Documentation Time: 5 minutes  Other Time Spent: 0 minutes  Total: 41 minutes        Elizabeth Alejandro MD

## 2023-11-07 NOTE — ASSESSMENT & PLAN NOTE
Reviewed results of BM which demonstrate potential plasma cell disorder in setting of known MGUS.  While his counts have improved somewhat, would recommend completion of myeloma evaluation.  Whether he meets criteria for multiple myeloma may be somewhat difficult to ascertain given concurrent kidney issues, but will plan for PET scan, 24 hr urine, and beta-2 microglobulin.  Plan for follow up with our MGUS/myeloma team to review results and make any potential plans.

## 2023-11-08 ENCOUNTER — PHARMACY VISIT (OUTPATIENT)
Dept: PHARMACY | Facility: CLINIC | Age: 68
End: 2023-11-08
Payer: MEDICAID

## 2023-11-10 RX ORDER — PEN NEEDLE, DIABETIC 32GX 5/32"
NEEDLE, DISPOSABLE MISCELLANEOUS
COMMUNITY
Start: 2023-10-25 | End: 2024-01-16 | Stop reason: ENTERED-IN-ERROR

## 2023-11-10 RX ORDER — TACROLIMUS 1 MG/1
CAPSULE ORAL
COMMUNITY
Start: 2023-10-25 | End: 2023-11-22 | Stop reason: HOSPADM

## 2023-11-10 RX ORDER — ISOPROPYL ALCOHOL 70 ML/100ML
SWAB TOPICAL
COMMUNITY
Start: 2023-10-25 | End: 2023-11-16 | Stop reason: WASHOUT

## 2023-11-12 DIAGNOSIS — E21.2 TERTIARY HYPERPARATHYROIDISM (MULTI): ICD-10-CM

## 2023-11-13 ENCOUNTER — TELEPHONE (OUTPATIENT)
Dept: TRANSPLANT | Facility: HOSPITAL | Age: 68
End: 2023-11-13

## 2023-11-13 ENCOUNTER — LAB (OUTPATIENT)
Dept: LAB | Facility: LAB | Age: 68
End: 2023-11-13
Payer: COMMERCIAL

## 2023-11-13 DIAGNOSIS — D61.818 PANCYTOPENIA (MULTI): ICD-10-CM

## 2023-11-13 DIAGNOSIS — Z94.0 KIDNEY REPLACED BY TRANSPLANT (HHS-HCC): ICD-10-CM

## 2023-11-13 DIAGNOSIS — D47.2 MGUS (MONOCLONAL GAMMOPATHY OF UNKNOWN SIGNIFICANCE): ICD-10-CM

## 2023-11-13 LAB
ALBUMIN SERPL BCP-MCNC: 3.1 G/DL (ref 3.4–5)
ALP SERPL-CCNC: 76 U/L (ref 33–136)
ALT SERPL W P-5'-P-CCNC: 31 U/L (ref 10–52)
ANION GAP SERPL CALC-SCNC: 9 MMOL/L (ref 10–20)
AST SERPL W P-5'-P-CCNC: 15 U/L (ref 9–39)
B2 MICROGLOB SERPL-MCNC: 8.3 MG/L (ref 0.7–2.2)
BASOPHILS # BLD AUTO: 0.01 X10*3/UL (ref 0–0.1)
BASOPHILS NFR BLD AUTO: 0.2 %
BILIRUB SERPL-MCNC: 0.4 MG/DL (ref 0–1.2)
BUN SERPL-MCNC: 40 MG/DL (ref 6–23)
CALCIUM SERPL-MCNC: 8.7 MG/DL (ref 8.6–10.6)
CHLORIDE SERPL-SCNC: 110 MMOL/L (ref 98–107)
CO2 SERPL-SCNC: 24 MMOL/L (ref 21–32)
CREAT SERPL-MCNC: 2.87 MG/DL (ref 0.5–1.3)
EOSINOPHIL # BLD AUTO: 0.05 X10*3/UL (ref 0–0.7)
EOSINOPHIL NFR BLD AUTO: 1.2 %
ERYTHROCYTE [DISTWIDTH] IN BLOOD BY AUTOMATED COUNT: 17 % (ref 11.5–14.5)
GFR SERPL CREATININE-BSD FRML MDRD: 23 ML/MIN/1.73M*2
GLUCOSE SERPL-MCNC: 180 MG/DL (ref 74–99)
HCT VFR BLD AUTO: 30.4 % (ref 41–52)
HGB BLD-MCNC: 9.9 G/DL (ref 13.5–17.5)
IMM GRANULOCYTES # BLD AUTO: 0.03 X10*3/UL (ref 0–0.7)
IMM GRANULOCYTES NFR BLD AUTO: 0.7 % (ref 0–0.9)
LYMPHOCYTES # BLD AUTO: 0.95 X10*3/UL (ref 1.2–4.8)
LYMPHOCYTES NFR BLD AUTO: 22.6 %
MCH RBC QN AUTO: 28.4 PG (ref 26–34)
MCHC RBC AUTO-ENTMCNC: 32.6 G/DL (ref 32–36)
MCV RBC AUTO: 87 FL (ref 80–100)
MONOCYTES # BLD AUTO: 0.32 X10*3/UL (ref 0.1–1)
MONOCYTES NFR BLD AUTO: 7.6 %
NEUTROPHILS # BLD AUTO: 2.84 X10*3/UL (ref 1.2–7.7)
NEUTROPHILS NFR BLD AUTO: 67.7 %
NRBC BLD-RTO: 0 /100 WBCS (ref 0–0)
PLATELET # BLD AUTO: 137 X10*3/UL (ref 150–450)
POTASSIUM SERPL-SCNC: 4.7 MMOL/L (ref 3.5–5.3)
PROT SERPL-MCNC: 5.5 G/DL (ref 6.4–8.2)
RBC # BLD AUTO: 3.48 X10*6/UL (ref 4.5–5.9)
SODIUM SERPL-SCNC: 138 MMOL/L (ref 136–145)
WBC # BLD AUTO: 4.2 X10*3/UL (ref 4.4–11.3)

## 2023-11-13 PROCEDURE — 80053 COMPREHEN METABOLIC PANEL: CPT

## 2023-11-13 PROCEDURE — 36415 COLL VENOUS BLD VENIPUNCTURE: CPT

## 2023-11-13 PROCEDURE — 85025 COMPLETE CBC W/AUTO DIFF WBC: CPT

## 2023-11-13 PROCEDURE — 82232 ASSAY OF BETA-2 PROTEIN: CPT

## 2023-11-13 RX ORDER — AZATHIOPRINE 50 MG/1
25 TABLET ORAL DAILY
Qty: 15 TABLET | Refills: 11 | Status: SHIPPED | OUTPATIENT
Start: 2023-11-13 | End: 2023-12-21 | Stop reason: HOSPADM

## 2023-11-13 RX ORDER — PREDNISONE 5 MG/1
10 TABLET ORAL EVERY MORNING
Qty: 60 TABLET | Refills: 3 | Status: SHIPPED | OUTPATIENT
Start: 2023-11-13 | End: 2024-01-21 | Stop reason: HOSPADM

## 2023-11-13 RX ORDER — PRAVASTATIN SODIUM 40 MG/1
40 TABLET ORAL NIGHTLY
Qty: 10 TABLET | Refills: 0 | Status: ON HOLD | OUTPATIENT
Start: 2023-11-13 | End: 2023-11-22 | Stop reason: SDUPTHER

## 2023-11-13 NOTE — TELEPHONE ENCOUNTER
Called and spoke with patient, refills for prednisone, aza and short script for pravastatin sent to Dr. Arroyo for signature. Patient has a first visit with PCP on 11/15/2023 advised to ask for his refill of amlodipine and pravastatin.

## 2023-11-15 ENCOUNTER — HOSPITAL ENCOUNTER (INPATIENT)
Facility: HOSPITAL | Age: 68
LOS: 6 days | Discharge: HOME | End: 2023-11-22
Attending: EMERGENCY MEDICINE | Admitting: STUDENT IN AN ORGANIZED HEALTH CARE EDUCATION/TRAINING PROGRAM
Payer: COMMERCIAL

## 2023-11-15 ENCOUNTER — APPOINTMENT (OUTPATIENT)
Dept: PRIMARY CARE | Facility: CLINIC | Age: 68
End: 2023-11-15
Payer: COMMERCIAL

## 2023-11-15 ENCOUNTER — APPOINTMENT (OUTPATIENT)
Dept: RADIOLOGY | Facility: HOSPITAL | Age: 68
End: 2023-11-15
Payer: COMMERCIAL

## 2023-11-15 DIAGNOSIS — Z13.6 ENCOUNTER FOR SCREENING FOR CARDIOVASCULAR DISORDERS: ICD-10-CM

## 2023-11-15 DIAGNOSIS — Z94.0 KIDNEY REPLACED BY TRANSPLANT (HHS-HCC): ICD-10-CM

## 2023-11-15 DIAGNOSIS — I10 HYPERTENSION, UNSPECIFIED TYPE: ICD-10-CM

## 2023-11-15 DIAGNOSIS — N12 PYELONEPHRITIS: Primary | ICD-10-CM

## 2023-11-15 DIAGNOSIS — M54.50 CHRONIC LOW BACK PAIN, UNSPECIFIED BACK PAIN LATERALITY, UNSPECIFIED WHETHER SCIATICA PRESENT: ICD-10-CM

## 2023-11-15 DIAGNOSIS — D47.2 MGUS (MONOCLONAL GAMMOPATHY OF UNKNOWN SIGNIFICANCE): ICD-10-CM

## 2023-11-15 DIAGNOSIS — G89.29 CHRONIC LOW BACK PAIN, UNSPECIFIED BACK PAIN LATERALITY, UNSPECIFIED WHETHER SCIATICA PRESENT: ICD-10-CM

## 2023-11-15 LAB
ALBUMIN SERPL BCP-MCNC: 2.9 G/DL (ref 3.4–5)
ALP SERPL-CCNC: 74 U/L (ref 33–136)
ALT SERPL W P-5'-P-CCNC: 24 U/L (ref 10–52)
ANION GAP SERPL CALC-SCNC: 12 MMOL/L (ref 10–20)
APPEARANCE UR: ABNORMAL
AST SERPL W P-5'-P-CCNC: 14 U/L (ref 9–39)
BASOPHILS # BLD AUTO: 0.01 X10*3/UL (ref 0–0.1)
BASOPHILS NFR BLD AUTO: 0.3 %
BILIRUB SERPL-MCNC: 0.4 MG/DL (ref 0–1.2)
BILIRUB UR STRIP.AUTO-MCNC: NEGATIVE MG/DL
BNP SERPL-MCNC: 319 PG/ML (ref 0–99)
BUN SERPL-MCNC: 43 MG/DL (ref 6–23)
CALCIUM SERPL-MCNC: 8.8 MG/DL (ref 8.6–10.6)
CARDIAC TROPONIN I PNL SERPL HS: 38 NG/L (ref 0–53)
CHLORIDE SERPL-SCNC: 114 MMOL/L (ref 98–107)
CHLORIDE UR-SCNC: 89 MMOL/L
CHLORIDE/CREATININE (MMOL/G) IN URINE: 77 MMOL/G CREAT (ref 23–275)
CO2 SERPL-SCNC: 24 MMOL/L (ref 21–32)
COLOR UR: YELLOW
CREAT SERPL-MCNC: 3.42 MG/DL (ref 0.5–1.3)
CREAT UR-MCNC: 115.5 MG/DL (ref 20–370)
EOSINOPHIL # BLD AUTO: 0.04 X10*3/UL (ref 0–0.7)
EOSINOPHIL NFR BLD AUTO: 1 %
ERYTHROCYTE [DISTWIDTH] IN BLOOD BY AUTOMATED COUNT: 16.7 % (ref 11.5–14.5)
GFR SERPL CREATININE-BSD FRML MDRD: 19 ML/MIN/1.73M*2
GLUCOSE SERPL-MCNC: 220 MG/DL (ref 74–99)
GLUCOSE UR STRIP.AUTO-MCNC: ABNORMAL MG/DL
HCT VFR BLD AUTO: 28.9 % (ref 41–52)
HGB BLD-MCNC: 9.4 G/DL (ref 13.5–17.5)
HOLD SPECIMEN: NORMAL
HYALINE CASTS #/AREA URNS AUTO: ABNORMAL /LPF
IMM GRANULOCYTES # BLD AUTO: 0.02 X10*3/UL (ref 0–0.7)
IMM GRANULOCYTES NFR BLD AUTO: 0.5 % (ref 0–0.9)
KETONES UR STRIP.AUTO-MCNC: NEGATIVE MG/DL
LEUKOCYTE ESTERASE UR QL STRIP.AUTO: NEGATIVE
LYMPHOCYTES # BLD AUTO: 0.48 X10*3/UL (ref 1.2–4.8)
LYMPHOCYTES NFR BLD AUTO: 12.4 %
MAGNESIUM SERPL-MCNC: 1.91 MG/DL (ref 1.6–2.4)
MCH RBC QN AUTO: 29.2 PG (ref 26–34)
MCHC RBC AUTO-ENTMCNC: 32.5 G/DL (ref 32–36)
MCV RBC AUTO: 90 FL (ref 80–100)
MONOCYTES # BLD AUTO: 0.15 X10*3/UL (ref 0.1–1)
MONOCYTES NFR BLD AUTO: 3.9 %
MUCOUS THREADS #/AREA URNS AUTO: ABNORMAL /LPF
NEUTROPHILS # BLD AUTO: 3.16 X10*3/UL (ref 1.2–7.7)
NEUTROPHILS NFR BLD AUTO: 81.9 %
NITRITE UR QL STRIP.AUTO: NEGATIVE
NRBC BLD-RTO: 0 /100 WBCS (ref 0–0)
PH UR STRIP.AUTO: 5 [PH]
PHOSPHATE SERPL-MCNC: 2.8 MG/DL (ref 2.5–4.9)
PLATELET # BLD AUTO: 121 X10*3/UL (ref 150–450)
POTASSIUM SERPL-SCNC: 5.5 MMOL/L (ref 3.5–5.3)
POTASSIUM UR-SCNC: 52 MMOL/L
POTASSIUM/CREAT UR-RTO: 45 MMOL/G CREAT
PROT SERPL-MCNC: 5.5 G/DL (ref 6.4–8.2)
PROT UR STRIP.AUTO-MCNC: ABNORMAL MG/DL
RBC # BLD AUTO: 3.22 X10*6/UL (ref 4.5–5.9)
RBC # UR STRIP.AUTO: ABNORMAL /UL
RBC #/AREA URNS AUTO: ABNORMAL /HPF
SODIUM SERPL-SCNC: 144 MMOL/L (ref 136–145)
SODIUM UR-SCNC: 65 MMOL/L
SODIUM/CREAT UR-RTO: 56 MMOL/G CREAT
SP GR UR STRIP.AUTO: 1.02
SQUAMOUS #/AREA URNS AUTO: ABNORMAL /HPF
UROBILINOGEN UR STRIP.AUTO-MCNC: <2 MG/DL
WBC # BLD AUTO: 3.9 X10*3/UL (ref 4.4–11.3)
WBC #/AREA URNS AUTO: ABNORMAL /HPF

## 2023-11-15 PROCEDURE — 74176 CT ABD & PELVIS W/O CONTRAST: CPT | Performed by: RADIOLOGY

## 2023-11-15 PROCEDURE — 99285 EMERGENCY DEPT VISIT HI MDM: CPT | Performed by: EMERGENCY MEDICINE

## 2023-11-15 PROCEDURE — 84075 ASSAY ALKALINE PHOSPHATASE: CPT | Performed by: EMERGENCY MEDICINE

## 2023-11-15 PROCEDURE — 84100 ASSAY OF PHOSPHORUS: CPT | Performed by: EMERGENCY MEDICINE

## 2023-11-15 PROCEDURE — 85025 COMPLETE CBC W/AUTO DIFF WBC: CPT | Performed by: EMERGENCY MEDICINE

## 2023-11-15 PROCEDURE — 76775 US EXAM ABDO BACK WALL LIM: CPT | Performed by: EMERGENCY MEDICINE

## 2023-11-15 PROCEDURE — 2500000004 HC RX 250 GENERAL PHARMACY W/ HCPCS (ALT 636 FOR OP/ED): Mod: SE | Performed by: STUDENT IN AN ORGANIZED HEALTH CARE EDUCATION/TRAINING PROGRAM

## 2023-11-15 PROCEDURE — 84484 ASSAY OF TROPONIN QUANT: CPT | Performed by: EMERGENCY MEDICINE

## 2023-11-15 PROCEDURE — 81001 URINALYSIS AUTO W/SCOPE: CPT

## 2023-11-15 PROCEDURE — 74176 CT ABD & PELVIS W/O CONTRAST: CPT

## 2023-11-15 PROCEDURE — 2500000001 HC RX 250 WO HCPCS SELF ADMINISTERED DRUGS (ALT 637 FOR MEDICARE OP): Mod: SE | Performed by: STUDENT IN AN ORGANIZED HEALTH CARE EDUCATION/TRAINING PROGRAM

## 2023-11-15 PROCEDURE — 83735 ASSAY OF MAGNESIUM: CPT | Performed by: EMERGENCY MEDICINE

## 2023-11-15 PROCEDURE — 99285 EMERGENCY DEPT VISIT HI MDM: CPT | Mod: 25 | Performed by: EMERGENCY MEDICINE

## 2023-11-15 PROCEDURE — 84133 ASSAY OF URINE POTASSIUM: CPT | Performed by: STUDENT IN AN ORGANIZED HEALTH CARE EDUCATION/TRAINING PROGRAM

## 2023-11-15 PROCEDURE — 36415 COLL VENOUS BLD VENIPUNCTURE: CPT | Performed by: EMERGENCY MEDICINE

## 2023-11-15 PROCEDURE — 83880 ASSAY OF NATRIURETIC PEPTIDE: CPT | Performed by: EMERGENCY MEDICINE

## 2023-11-15 PROCEDURE — 80053 COMPREHEN METABOLIC PANEL: CPT | Performed by: EMERGENCY MEDICINE

## 2023-11-15 PROCEDURE — 93010 ELECTROCARDIOGRAM REPORT: CPT | Performed by: NURSE PRACTITIONER

## 2023-11-15 RX ORDER — ACETAMINOPHEN 325 MG/1
650 TABLET ORAL ONCE
Status: COMPLETED | OUTPATIENT
Start: 2023-11-15 | End: 2023-11-15

## 2023-11-15 RX ORDER — HYDRALAZINE HYDROCHLORIDE 25 MG/1
25 TABLET, FILM COATED ORAL ONCE
Status: COMPLETED | OUTPATIENT
Start: 2023-11-15 | End: 2023-11-15

## 2023-11-15 RX ADMIN — TACROLIMUS 2.5 MG: 0.5 CAPSULE ORAL at 23:52

## 2023-11-15 RX ADMIN — HYDRALAZINE HYDROCHLORIDE 25 MG: 25 TABLET, FILM COATED ORAL at 23:49

## 2023-11-15 RX ADMIN — ACETAMINOPHEN 650 MG: 325 TABLET ORAL at 23:49

## 2023-11-15 ASSESSMENT — PAIN DESCRIPTION - PROGRESSION: CLINICAL_PROGRESSION: NOT CHANGED

## 2023-11-15 ASSESSMENT — LIFESTYLE VARIABLES
HAVE PEOPLE ANNOYED YOU BY CRITICIZING YOUR DRINKING: NO
EVER HAD A DRINK FIRST THING IN THE MORNING TO STEADY YOUR NERVES TO GET RID OF A HANGOVER: NO
REASON UNABLE TO ASSESS: NO
EVER FELT BAD OR GUILTY ABOUT YOUR DRINKING: NO
HAVE YOU EVER FELT YOU SHOULD CUT DOWN ON YOUR DRINKING: NO

## 2023-11-15 ASSESSMENT — PAIN SCALES - GENERAL: PAINLEVEL_OUTOF10: 5 - MODERATE PAIN

## 2023-11-15 ASSESSMENT — PAIN - FUNCTIONAL ASSESSMENT: PAIN_FUNCTIONAL_ASSESSMENT: 0-10

## 2023-11-15 NOTE — ED TRIAGE NOTES
Pt had a kidney transplant in 2013 and pt believes they are shutting down has not urinated since Monday and having generalized body swelling. Denies any fevers, N&V.   
No...

## 2023-11-15 NOTE — Clinical Note
L Tx renal bx complete. 8 samples obtained. Dressing C/D/I. Total 2mg versed, 100mcg fentanyl given. VSS t/o procedure.

## 2023-11-15 NOTE — TELEPHONE ENCOUNTER
Called patient, back advised since his bone biopsy he has been having back pain and his urine output has decreased drastically starting Monday night and developed swelling.  Advised patient he needs to go to ER to be evaluated.  Patient verbalized understanding

## 2023-11-16 ENCOUNTER — CLINICAL SUPPORT (OUTPATIENT)
Dept: EMERGENCY MEDICINE | Facility: HOSPITAL | Age: 68
End: 2023-11-16
Payer: COMMERCIAL

## 2023-11-16 PROBLEM — N12 PYELONEPHRITIS: Status: ACTIVE | Noted: 2023-11-16

## 2023-11-16 LAB
ALBUMIN SERPL BCP-MCNC: 3 G/DL (ref 3.4–5)
ANION GAP SERPL CALC-SCNC: 12 MMOL/L (ref 10–20)
ATRIAL RATE: 300 BPM
BUN SERPL-MCNC: 49 MG/DL (ref 6–23)
CALCIUM SERPL-MCNC: 8.9 MG/DL (ref 8.6–10.6)
CHLORIDE SERPL-SCNC: 114 MMOL/L (ref 98–107)
CO2 SERPL-SCNC: 21 MMOL/L (ref 21–32)
CREAT SERPL-MCNC: 3.16 MG/DL (ref 0.5–1.3)
GFR SERPL CREATININE-BSD FRML MDRD: 21 ML/MIN/1.73M*2
GLUCOSE BLD MANUAL STRIP-MCNC: 128 MG/DL (ref 74–99)
GLUCOSE BLD MANUAL STRIP-MCNC: 200 MG/DL (ref 74–99)
GLUCOSE BLD MANUAL STRIP-MCNC: 255 MG/DL (ref 74–99)
GLUCOSE SERPL-MCNC: 139 MG/DL (ref 74–99)
P AXIS: 67 DEGREES
P OFFSET: 200 MS
P ONSET: 135 MS
PHOSPHATE SERPL-MCNC: 2.7 MG/DL (ref 2.5–4.9)
POTASSIUM SERPL-SCNC: 5.5 MMOL/L (ref 3.5–5.3)
PROT UR-ACNC: 951 MG/DL (ref 5–25)
Q ONSET: 216 MS
QRS COUNT: 12 BEATS
QRS DURATION: 138 MS
QT INTERVAL: 422 MS
QTC CALCULATION(BAZETT): 464 MS
QTC FREDERICIA: 450 MS
R AXIS: 69 DEGREES
SODIUM SERPL-SCNC: 141 MMOL/L (ref 136–145)
T AXIS: 39 DEGREES
T OFFSET: 427 MS
VENTRICULAR RATE: 73 BPM

## 2023-11-16 PROCEDURE — 82947 ASSAY GLUCOSE BLOOD QUANT: CPT

## 2023-11-16 PROCEDURE — 84166 PROTEIN E-PHORESIS/URINE/CSF: CPT

## 2023-11-16 PROCEDURE — 93005 ELECTROCARDIOGRAM TRACING: CPT

## 2023-11-16 PROCEDURE — 86334 IMMUNOFIX E-PHORESIS SERUM: CPT

## 2023-11-16 PROCEDURE — 87799 DETECT AGENT NOS DNA QUANT: CPT

## 2023-11-16 PROCEDURE — 2500000005 HC RX 250 GENERAL PHARMACY W/O HCPCS

## 2023-11-16 PROCEDURE — 2500000004 HC RX 250 GENERAL PHARMACY W/ HCPCS (ALT 636 FOR OP/ED)

## 2023-11-16 PROCEDURE — 2500000001 HC RX 250 WO HCPCS SELF ADMINISTERED DRUGS (ALT 637 FOR MEDICARE OP)

## 2023-11-16 PROCEDURE — 84156 ASSAY OF PROTEIN URINE: CPT

## 2023-11-16 PROCEDURE — 2500000004 HC RX 250 GENERAL PHARMACY W/ HCPCS (ALT 636 FOR OP/ED): Mod: SE

## 2023-11-16 PROCEDURE — 2500000002 HC RX 250 W HCPCS SELF ADMINISTERED DRUGS (ALT 637 FOR MEDICARE OP, ALT 636 FOR OP/ED)

## 2023-11-16 PROCEDURE — 1100000001 HC PRIVATE ROOM DAILY

## 2023-11-16 PROCEDURE — 99223 1ST HOSP IP/OBS HIGH 75: CPT

## 2023-11-16 PROCEDURE — 99223 1ST HOSP IP/OBS HIGH 75: CPT | Performed by: HOSPITALIST

## 2023-11-16 PROCEDURE — 86325 OTHER IMMUNOELECTROPHORESIS: CPT

## 2023-11-16 PROCEDURE — 84100 ASSAY OF PHOSPHORUS: CPT

## 2023-11-16 PROCEDURE — 36415 COLL VENOUS BLD VENIPUNCTURE: CPT

## 2023-11-16 RX ORDER — LOSARTAN POTASSIUM 25 MG/1
25 TABLET ORAL DAILY
Status: DISCONTINUED | OUTPATIENT
Start: 2023-11-16 | End: 2023-11-21

## 2023-11-16 RX ORDER — GABAPENTIN 300 MG/1
300 CAPSULE ORAL 2 TIMES DAILY
Status: DISCONTINUED | OUTPATIENT
Start: 2023-11-16 | End: 2023-11-16

## 2023-11-16 RX ORDER — DEXTROSE MONOHYDRATE 100 MG/ML
0.3 INJECTION, SOLUTION INTRAVENOUS ONCE AS NEEDED
Status: DISCONTINUED | OUTPATIENT
Start: 2023-11-16 | End: 2023-11-22 | Stop reason: HOSPADM

## 2023-11-16 RX ORDER — LORATADINE 10 MG/1
10 TABLET ORAL DAILY
Status: DISCONTINUED | OUTPATIENT
Start: 2023-11-16 | End: 2023-11-22 | Stop reason: HOSPADM

## 2023-11-16 RX ORDER — AMLODIPINE BESYLATE 10 MG/1
10 TABLET ORAL DAILY
Status: DISCONTINUED | OUTPATIENT
Start: 2023-11-16 | End: 2023-11-22 | Stop reason: HOSPADM

## 2023-11-16 RX ORDER — DOCUSATE SODIUM 100 MG/1
100 CAPSULE, LIQUID FILLED ORAL 2 TIMES DAILY PRN
Status: DISCONTINUED | OUTPATIENT
Start: 2023-11-16 | End: 2023-11-22 | Stop reason: HOSPADM

## 2023-11-16 RX ORDER — DEXTROSE 50 % IN WATER (D50W) INTRAVENOUS SYRINGE
25
Status: DISCONTINUED | OUTPATIENT
Start: 2023-11-16 | End: 2023-11-22 | Stop reason: HOSPADM

## 2023-11-16 RX ORDER — CARVEDILOL 12.5 MG/1
25 TABLET ORAL 2 TIMES DAILY
Status: DISCONTINUED | OUTPATIENT
Start: 2023-11-16 | End: 2023-11-16

## 2023-11-16 RX ORDER — FUROSEMIDE 10 MG/ML
40 INJECTION INTRAMUSCULAR; INTRAVENOUS ONCE
Status: DISCONTINUED | OUTPATIENT
Start: 2023-11-16 | End: 2023-11-16

## 2023-11-16 RX ORDER — CHOLECALCIFEROL (VITAMIN D3) 25 MCG
2000 TABLET ORAL DAILY
Status: DISCONTINUED | OUTPATIENT
Start: 2023-11-16 | End: 2023-11-22 | Stop reason: HOSPADM

## 2023-11-16 RX ORDER — HYDRALAZINE HYDROCHLORIDE 20 MG/ML
20 INJECTION INTRAMUSCULAR; INTRAVENOUS ONCE
Status: COMPLETED | OUTPATIENT
Start: 2023-11-16 | End: 2023-11-16

## 2023-11-16 RX ORDER — HYDROMORPHONE HYDROCHLORIDE 1 MG/ML
0.5 INJECTION, SOLUTION INTRAMUSCULAR; INTRAVENOUS; SUBCUTANEOUS ONCE
Status: COMPLETED | OUTPATIENT
Start: 2023-11-16 | End: 2023-11-16

## 2023-11-16 RX ORDER — CINACALCET 30 MG/1
30 TABLET, FILM COATED ORAL DAILY
Status: DISCONTINUED | OUTPATIENT
Start: 2023-11-16 | End: 2023-11-22 | Stop reason: HOSPADM

## 2023-11-16 RX ORDER — HYDRALAZINE HYDROCHLORIDE 25 MG/1
25 TABLET, FILM COATED ORAL 3 TIMES DAILY
Status: DISCONTINUED | OUTPATIENT
Start: 2023-11-16 | End: 2023-11-20

## 2023-11-16 RX ORDER — HYDRALAZINE HYDROCHLORIDE 25 MG/1
25 TABLET, FILM COATED ORAL ONCE
Status: COMPLETED | OUTPATIENT
Start: 2023-11-16 | End: 2023-11-16

## 2023-11-16 RX ORDER — POLYETHYLENE GLYCOL 3350 17 G/17G
17 POWDER, FOR SOLUTION ORAL DAILY PRN
Status: DISCONTINUED | OUTPATIENT
Start: 2023-11-16 | End: 2023-11-22 | Stop reason: HOSPADM

## 2023-11-16 RX ORDER — FERROUS SULFATE 325(65) MG
65 TABLET ORAL 2 TIMES DAILY
Status: DISCONTINUED | OUTPATIENT
Start: 2023-11-16 | End: 2023-11-22 | Stop reason: HOSPADM

## 2023-11-16 RX ORDER — AZATHIOPRINE 50 MG/1
25 TABLET ORAL DAILY
Status: DISCONTINUED | OUTPATIENT
Start: 2023-11-16 | End: 2023-11-21

## 2023-11-16 RX ORDER — PREDNISONE 5 MG/1
10 TABLET ORAL EVERY MORNING
Status: DISCONTINUED | OUTPATIENT
Start: 2023-11-16 | End: 2023-11-22 | Stop reason: HOSPADM

## 2023-11-16 RX ORDER — LABETALOL HYDROCHLORIDE 5 MG/ML
INJECTION, SOLUTION INTRAVENOUS
Status: DISPENSED
Start: 2023-11-16 | End: 2023-11-16

## 2023-11-16 RX ORDER — CEFTRIAXONE 1 G/50ML
1 INJECTION, SOLUTION INTRAVENOUS EVERY 24 HOURS
Status: DISCONTINUED | OUTPATIENT
Start: 2023-11-17 | End: 2023-11-16

## 2023-11-16 RX ORDER — INSULIN GLARGINE 100 [IU]/ML
12 INJECTION, SOLUTION SUBCUTANEOUS DAILY
Status: DISCONTINUED | OUTPATIENT
Start: 2023-11-16 | End: 2023-11-18

## 2023-11-16 RX ORDER — ACETAMINOPHEN 325 MG/1
975 TABLET ORAL EVERY 8 HOURS
Status: DISCONTINUED | OUTPATIENT
Start: 2023-11-16 | End: 2023-11-22 | Stop reason: HOSPADM

## 2023-11-16 RX ORDER — PRAVASTATIN SODIUM 20 MG/1
40 TABLET ORAL NIGHTLY
Status: DISCONTINUED | OUTPATIENT
Start: 2023-11-16 | End: 2023-11-22 | Stop reason: HOSPADM

## 2023-11-16 RX ORDER — LIDOCAINE 560 MG/1
1 PATCH PERCUTANEOUS; TOPICAL; TRANSDERMAL DAILY
Status: DISCONTINUED | OUTPATIENT
Start: 2023-11-16 | End: 2023-11-22 | Stop reason: HOSPADM

## 2023-11-16 RX ORDER — INSULIN LISPRO 100 [IU]/ML
0-10 INJECTION, SOLUTION INTRAVENOUS; SUBCUTANEOUS
Status: DISCONTINUED | OUTPATIENT
Start: 2023-11-16 | End: 2023-11-22 | Stop reason: HOSPADM

## 2023-11-16 RX ORDER — CEFTRIAXONE 1 G/50ML
1 INJECTION, SOLUTION INTRAVENOUS ONCE
Status: COMPLETED | OUTPATIENT
Start: 2023-11-16 | End: 2023-11-16

## 2023-11-16 RX ADMIN — TACROLIMUS 2.5 MG: 0.5 CAPSULE ORAL at 12:02

## 2023-11-16 RX ADMIN — HYDRALAZINE HYDROCHLORIDE 25 MG: 25 TABLET, FILM COATED ORAL at 14:57

## 2023-11-16 RX ADMIN — TACROLIMUS 2.5 MG: 0.5 CAPSULE ORAL at 18:33

## 2023-11-16 RX ADMIN — HYDRALAZINE HYDROCHLORIDE 25 MG: 25 TABLET, FILM COATED ORAL at 21:32

## 2023-11-16 RX ADMIN — CEFTRIAXONE SODIUM 1 G: 1 INJECTION, SOLUTION INTRAVENOUS at 05:00

## 2023-11-16 RX ADMIN — ACETAMINOPHEN 975 MG: 325 TABLET ORAL at 23:40

## 2023-11-16 RX ADMIN — CINACALCET 30 MG: 30 TABLET ORAL at 09:45

## 2023-11-16 RX ADMIN — CARVEDILOL 25 MG: 12.5 TABLET, FILM COATED ORAL at 09:44

## 2023-11-16 RX ADMIN — HYDROMORPHONE HYDROCHLORIDE 0.5 MG: 1 INJECTION, SOLUTION INTRAMUSCULAR; INTRAVENOUS; SUBCUTANEOUS at 05:00

## 2023-11-16 RX ADMIN — INSULIN LISPRO 6 UNITS: 100 INJECTION, SOLUTION INTRAVENOUS; SUBCUTANEOUS at 09:18

## 2023-11-16 RX ADMIN — INSULIN LISPRO 2 UNITS: 100 INJECTION, SOLUTION INTRAVENOUS; SUBCUTANEOUS at 12:02

## 2023-11-16 RX ADMIN — FERROUS SULFATE TAB 325 MG (65 MG ELEMENTAL FE) 65 MG OF IRON: 325 (65 FE) TAB at 21:32

## 2023-11-16 RX ADMIN — HYDRALAZINE HYDROCHLORIDE 25 MG: 25 TABLET, FILM COATED ORAL at 18:08

## 2023-11-16 RX ADMIN — SODIUM ZIRCONIUM CYCLOSILICATE 10 G: 10 POWDER, FOR SUSPENSION ORAL at 17:20

## 2023-11-16 RX ADMIN — HYDRALAZINE HYDROCHLORIDE 20 MG: 20 INJECTION INTRAMUSCULAR; INTRAVENOUS at 03:11

## 2023-11-16 RX ADMIN — LORATADINE 10 MG: 10 TABLET ORAL at 09:42

## 2023-11-16 RX ADMIN — HYDRALAZINE HYDROCHLORIDE 25 MG: 25 TABLET, FILM COATED ORAL at 09:43

## 2023-11-16 RX ADMIN — CARVEDILOL 37.5 MG: 25 TABLET, FILM COATED ORAL at 21:32

## 2023-11-16 RX ADMIN — SODIUM ZIRCONIUM CYCLOSILICATE 10 G: 10 POWDER, FOR SUSPENSION ORAL at 21:31

## 2023-11-16 RX ADMIN — INSULIN GLARGINE 12 UNITS: 100 INJECTION, SOLUTION SUBCUTANEOUS at 09:18

## 2023-11-16 RX ADMIN — LIDOCAINE 1 PATCH: 4 PATCH TOPICAL at 09:51

## 2023-11-16 RX ADMIN — PREDNISONE 10 MG: 5 TABLET ORAL at 09:44

## 2023-11-16 RX ADMIN — PRAVASTATIN SODIUM 40 MG: 20 TABLET ORAL at 21:32

## 2023-11-16 RX ADMIN — AMLODIPINE BESYLATE 10 MG: 10 TABLET ORAL at 09:45

## 2023-11-16 RX ADMIN — Medication 2000 UNITS: at 09:42

## 2023-11-16 RX ADMIN — FERROUS SULFATE TAB 325 MG (65 MG ELEMENTAL FE) 65 MG OF IRON: 325 (65 FE) TAB at 09:42

## 2023-11-16 ASSESSMENT — ENCOUNTER SYMPTOMS
HEMATURIA: 0
NEUROLOGICAL NEGATIVE: 1
VOMITING: 0
ABDOMINAL DISTENTION: 1
ABDOMINAL PAIN: 1
ANAL BLEEDING: 0
DIFFICULTY URINATING: 1
ACTIVITY CHANGE: 0
APPETITE CHANGE: 0
FREQUENCY: 0
CARDIOVASCULAR NEGATIVE: 1
UNEXPECTED WEIGHT CHANGE: 0
FATIGUE: 0
JOINT SWELLING: 0
DYSURIA: 1
FLANK PAIN: 0
BACK PAIN: 1
NECK STIFFNESS: 0
RESPIRATORY NEGATIVE: 1
CHILLS: 0
BLOOD IN STOOL: 0
CONSTIPATION: 0
NECK PAIN: 0
NAUSEA: 0
MYALGIAS: 0
FEVER: 0
RECTAL PAIN: 0
DIARRHEA: 0

## 2023-11-16 ASSESSMENT — COGNITIVE AND FUNCTIONAL STATUS - GENERAL
MOBILITY SCORE: 24
DAILY ACTIVITIY SCORE: 24

## 2023-11-16 ASSESSMENT — PAIN SCALES - GENERAL
PAINLEVEL_OUTOF10: 2
PAINLEVEL_OUTOF10: 0 - NO PAIN
PAINLEVEL_OUTOF10: 7
PAINLEVEL_OUTOF10: 10 - WORST POSSIBLE PAIN
PAINLEVEL_OUTOF10: 6
PAINLEVEL_OUTOF10: 10 - WORST POSSIBLE PAIN
PAINLEVEL_OUTOF10: 0 - NO PAIN

## 2023-11-16 ASSESSMENT — PAIN - FUNCTIONAL ASSESSMENT
PAIN_FUNCTIONAL_ASSESSMENT: 0-10

## 2023-11-16 ASSESSMENT — PAIN DESCRIPTION - LOCATION
LOCATION: BACK
LOCATION: BACK

## 2023-11-16 NOTE — ED NOTES
"Pharmacy Medication History Review    Manolo Bashir is a 68 y.o. male admitted for Pyelonephritis. Pharmacy reviewed the patient's kqdnl-[id number] medications and allergies for accuracy.    The list below reflects the updated PTA list. Comments regarding how patient may be taking medications differently can be found in the Admit Orders Activity  Prior to Admission Medications   Prescriptions Last Dose Informant Patient Reported? Taking?   Easy Touch Alcohol Prep Pads pads, medicated Unknown  Yes No   Sig: USE AS DIRECTED FOUR TIMES A DAY   FeroSuL 325 mg (65 mg iron) tablet Unknown  Yes Yes   Sig: Take 1 tablet (65 mg of iron) by mouth 2 times a day.   OneTouch Ultra Test strip Unknown  No No   Si strips 4 times a day before meals.   TechLITE Pen Needle 32 gauge x \" needle Unknown  Yes No   Vitamin D3 25 mcg (1,000 unit) capsule Unknown  Yes Yes   Sig: Take 2 capsules (50 mcg) by mouth once daily.   acetaminophen (Tylenol) 325 mg tablet Unknown  Yes Yes   Sig: Take 3 tablets (975 mg) by mouth every 8 hours.   amLODIPine (Norvasc) 10 mg tablet Unknown  Yes Yes   Sig: Take 1 tablet (10 mg) by mouth once daily.   azaTHIOprine (Imuran) 50 mg tablet Unknown  Yes Yes   Sig: Take 0.5 tablets (25 mg) by mouth once daily.   carvedilol (Coreg) 25 mg tablet Unknown  Yes Yes   Sig: Take 1 tablet (25 mg) by mouth 2 times a day.   cinacalcet (Sensipar) 30 mg tablet Unknown  Yes Yes   Sig: Take 1 tablet (30 mg) by mouth once daily. Take with food or shortly afer a meal. Swallow tablet whole; do not break or divide.   docusate sodium (Colace) 100 mg capsule Unknown  Yes Yes   Sig: TAKE 1 CAPSULE BY MOUTH TWO TIMES A DAY TO PREVENT CONSTIPATION   gabapentin (Neurontin) 300 mg capsule Unknown  Yes Yes   Sig: Take 1 capsule (300 mg) by mouth 2 times a day.   hydrALAZINE (Apresoline) 25 mg tablet Unknown  Yes Yes   Sig: Take 1 tablet (25 mg) by mouth 3 times a day. \"Only if blood pressure is above 150/90\"   insulin " glargine (Lantus) 100 unit/mL injection 11/16/2023  Yes Yes   Sig: Inject 24 Units under the skin once daily. Take as directed per insulin instructions.   insulin lispro (HumaLOG) 100 unit/mL injection 11/16/2023  Yes Yes   Sig: Per sliding scale up to 30 units daily   lancets (Unilet Super Thin Lancets) 30 gauge misc Unknown  No No   Sig: USE TO TEST BLOOD SUGAR THREE TIMES A DAY   loratadine (Claritin) 10 mg tablet Unknown  Yes Yes   Sig: Take 1 tablet (10 mg) by mouth once daily. Do not start before October 21, 2023.   losartan (Cozaar) 25 mg tablet Unknown  Yes Yes   Sig: Take 1 tablet (25 mg) by mouth once daily.   ondansetron ODT (Zofran-ODT) 4 mg disintegrating tablet Unknown  Yes Yes   Sig: Take 1 tablet (4 mg) by mouth every 8 hours if needed for nausea or vomiting.   pravastatin (Pravachol) 40 mg tablet Unknown  Yes Yes   Sig: Take 1 tablet (40 mg) by mouth once daily at bedtime for 10 days.   predniSONE (Deltasone) 5 mg tablet Unknown  Yes Yes   Sig: Take 2 tablets (10 mg) by mouth once daily in the morning.   tacrolimus (Prograf) 0.5 mg capsule Unknown  Yes Yes   Sig: Take 2.5 mg by mouth 2 times a day.   tacrolimus (Prograf) 1 mg capsule Unknown  Yes Yes   Sig: TAKE 2 CAPSULES BY MOUTH EVERY 12 HOURS *SEND IN BOTTLE, DOSE CHANGES FREQUENTLY*   tacrolimus (Protopic) 0.1 % ointment Unknown  Yes Yes   Sig: Apply topically 2 times a day.      Facility-Administered Medications: None        The list below reflects the updated allergy list. Please review each documented allergy for additional clarification and justification.  Allergies  Reviewed by Kathie Barroso RPh on 11/16/2023   No Known Allergies         Patient accepts M2B at discharge. Pharmacy has been updated to [facility].    Sources used to complete the med history include   - Patient (good historian) provided medication list  - Dispense history  - Surescripts    Below are additional concerns with the patient's PTA list.    -  None    Kathie Barroso, PharmD  PGY-1 Pharmacy Resident   [facility]  Please reach out via [employer] Secure Chat for questions, or if no response call [phone] or [employer] “MedRec”

## 2023-11-16 NOTE — PROGRESS NOTES
Emergency Medicine Transition of Care Note.    I received Manolo Bashir in signout from previous provider. Please see the previous ED provider note for all HPI, PE and MDM up to the time of sign-out. This is in addition to the primary record.     Medical Decision Making    Final diagnoses:   None     In brief Manolo Bashir is an 68 y.o. male presenting for   Chief Complaint   Patient presents with    Difficulty Urinating     At the time of sign out, vital signs were as follows:  Vitals:    11/16/23 0412   BP: (!) 193/88   Pulse: 86   Resp: 18   Temp:    SpO2: 99%     Patient was signed out to me with Transplant nephrology recommendations and reassessment pending.  On signout, blood pressure 193/88.  Given another dose of hydralazine here in the emergency department. Labs are notable for an ISIAH on CKD. Nephrology recommended close electrolyte monitoring, fluid resuscitation, and possible Lasix if urine output continues to be poor.  Given possible pyelonephritis seen on CT scan and patient's localized pain to the right flank region, recommended IV antibiotics. Started ceftriaxone in the ED. Will admit to medicine service with transplant nephrology following.      Dispo: Admit    Santos Chavira MD  Emergency Medicine PGY2

## 2023-11-16 NOTE — CONSULTS
Reason For Consult  ISIAH on CKD with hx of KT     History Of Present Illness  Manolo Bashir is a 68 y.o. male with PMHX of ESRD s/p 2 renal transplants (1992 followed by 2013), CKD 3, DM2, PRCA s/p rdical prostatectomy and HCV presented to the ED on 11/15 with chief complaint of difficulty urinating for the last few days along with increased swelling in the lower extremities. 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 This AM patient states that his swelling is improved and he has started to urinate more. Denies any fevers, chills, nausea, vomiting, diarrhea or SOB. Labs are significant for an ISIAH on CKD for which Transplant nephrology has been consulted.      Past Medical History  He has a past medical history of Chronic kidney disease, stage 3 unspecified (CMS/HCC) (09/26/2018), COVID-19 (06/18/2020), Diabetes (CMS/[facility]), HTN (hypertension), Other long term (current) drug therapy (07/20/2021), Personal history of other diseases of the circulatory system, Personal history of other infectious and parasitic diseases (08/17/2015), Polyp, colonic (08/17/2023), Primary osteoarthritis of both ankles (08/17/2023), Tubular adenoma of colon (08/17/2023), and Unspecified kidney failure (08/17/2016).    Surgical History  He has a past surgical history that includes Prostatectomy (10/11/2013); Ileostomy (04/25/2017); Other surgical history (04/21/2017); Ileostomy closure (08/17/2015); Other surgical history (08/17/2015); Other surgical history (08/17/2015); US guided percutaneous peritoneal or retroperitoneal fluid collection drainage (10/20/2022); transplant, kidney, open (1992); and transplant, kidney, open (2013).     Social History  He reports that he has never smoked. He has never used smokeless tobacco. He reports current alcohol use. He reports current drug use. Drug: Oxycodone.    Family History  Family History   Problem Relation  "Name Age of Onset    Bone cancer Mother      Other (corona's sarcome of the bone marrow) Mother      Prostate cancer Father      Diabetes Other Family Hist     Hypertension Other Family Hist         Allergies  Patient has no known allergies.      Physical Exam  AAO x3  CTA b/l on RA  Mild periheral edema  Soft abdomen   Incision site for transplant c/d/I  LUE AVF          I&O 24HR  No intake or output data in the 24 hours ending 11/16/23 1144    Vitals 24HR  Heart Rate:  [66-86]   Temp:  [36.8 °C (98.2 °F)]   Resp:  [15-18]   BP: (169-199)/(77-96)   Height:  [172.7 cm (5' 8\")]   Weight:  [75.3 kg (166 lb)]   SpO2:  [97 %-100 %]       Relevant Results  Admission on 11/15/2023   Component Date Value Ref Range Status    Glucose 11/15/2023 220 (H)  74 - 99 mg/dL Final    Sodium 11/15/2023 144  136 - 145 mmol/L Final    Potassium 11/15/2023 5.5 (H)  3.5 - 5.3 mmol/L Final    Chloride 11/15/2023 114 (H)  98 - 107 mmol/L Final    Bicarbonate 11/15/2023 24  21 - 32 mmol/L Final    Anion Gap 11/15/2023 12  10 - 20 mmol/L Final    Urea Nitrogen 11/15/2023 43 (H)  6 - 23 mg/dL Final    Creatinine 11/15/2023 3.42 (H)  0.50 - 1.30 mg/dL Final    eGFR 11/15/2023 19 (L)  >60 mL/min/1.73m*2 Final    Calculations of estimated GFR are performed using the 2021 CKD-EPI Study Refit equation without the race variable for the IDMS-Traceable creatinine methods.  https://jasn.asnjournals.org/content/early/2021/09/22/ASN.3578130022    Calcium 11/15/2023 8.8  8.6 - 10.6 mg/dL Final    Albumin 11/15/2023 2.9 (L)  3.4 - 5.0 g/dL Final    Alkaline Phosphatase 11/15/2023 74  33 - 136 U/L Final    Total Protein 11/15/2023 5.5 (L)  6.4 - 8.2 g/dL Final    AST 11/15/2023 14  9 - 39 U/L Final    Bilirubin, Total 11/15/2023 0.4  0.0 - 1.2 mg/dL Final    ALT 11/15/2023 24  10 - 52 U/L Final    Patients treated with Sulfasalazine may generate falsely decreased results for ALT.    WBC 11/15/2023 3.9 (L)  4.4 - 11.3 x10*3/uL Final    nRBC 11/15/2023 0.0  " 0.0 - 0.0 /100 WBCs Final    RBC 11/15/2023 3.22 (L)  4.50 - 5.90 x10*6/uL Final    Hemoglobin 11/15/2023 9.4 (L)  13.5 - 17.5 g/dL Final    Hematocrit 11/15/2023 28.9 (L)  41.0 - 52.0 % Final    MCV 11/15/2023 90  80 - 100 fL Final    MCH 11/15/2023 29.2  26.0 - 34.0 pg Final    MCHC 11/15/2023 32.5  32.0 - 36.0 g/dL Final    RDW 11/15/2023 16.7 (H)  11.5 - 14.5 % Final    Platelets 11/15/2023 121 (L)  150 - 450 x10*3/uL Final    Neutrophils % 11/15/2023 81.9  40.0 - 80.0 % Final    Immature Granulocytes %, Automated 11/15/2023 0.5  0.0 - 0.9 % Final    Immature Granulocyte Count (IG) includes promyelocytes, myelocytes and metamyelocytes but does not include bands. Percent differential counts (%) should be interpreted in the context of the absolute cell counts (cells/UL).    Lymphocytes % 11/15/2023 12.4  13.0 - 44.0 % Final    Monocytes % 11/15/2023 3.9  2.0 - 10.0 % Final    Eosinophils % 11/15/2023 1.0  0.0 - 6.0 % Final    Basophils % 11/15/2023 0.3  0.0 - 2.0 % Final    Neutrophils Absolute 11/15/2023 3.16  1.20 - 7.70 x10*3/uL Final    Percent differential counts (%) should be interpreted in the context of the absolute cell counts (cells/uL).    Immature Granulocytes Absolute, Au* 11/15/2023 0.02  0.00 - 0.70 x10*3/uL Final    Lymphocytes Absolute 11/15/2023 0.48 (L)  1.20 - 4.80 x10*3/uL Final    Monocytes Absolute 11/15/2023 0.15  0.10 - 1.00 x10*3/uL Final    Eosinophils Absolute 11/15/2023 0.04  0.00 - 0.70 x10*3/uL Final    Basophils Absolute 11/15/2023 0.01  0.00 - 0.10 x10*3/uL Final    Magnesium 11/15/2023 1.91  1.60 - 2.40 mg/dL Final    Phosphorus 11/15/2023 2.8  2.5 - 4.9 mg/dL Final    The performance characteristics of phosphorus testing in heparinized plasma have been validated by the individual  laboratory site where testing is performed. Testing on heparinized plasma is not approved by the FDA; however, such approval is not necessary.    Color, Urine 11/15/2023 Yellow  Straw, Yellow Final     Appearance, Urine 11/15/2023 Hazy (N)  Clear Final    Specific Gravity, Urine 11/15/2023 1.024  1.005 - 1.035 Final    pH, Urine 11/15/2023 5.0  5.0, 5.5, 6.0, 6.5, 7.0, 7.5, 8.0 Final    Protein, Urine 11/15/2023 >=500 (3+) (N)  NEGATIVE mg/dL Final    Glucose, Urine 11/15/2023 150 (2+) (A)  NEGATIVE mg/dL Final    Blood, Urine 11/15/2023 SMALL (1+) (A)  NEGATIVE Final    Ketones, Urine 11/15/2023 NEGATIVE  NEGATIVE mg/dL Final    Bilirubin, Urine 11/15/2023 NEGATIVE  NEGATIVE Final    Urobilinogen, Urine 11/15/2023 <2.0  <2.0 mg/dL Final    Nitrite, Urine 11/15/2023 NEGATIVE  NEGATIVE Final    Leukocyte Esterase, Urine 11/15/2023 NEGATIVE  NEGATIVE Final    Extra Tube 11/15/2023 Hold for add-ons.   Final    Auto resulted.    WBC, Urine 11/15/2023 1-5  1-5, NONE /HPF Final    RBC, Urine 11/15/2023 1-2  NONE, 1-2, 3-5 /HPF Final    Squamous Epithelial Cells, Urine 11/15/2023 1-9 (SPARSE)  Reference range not established. /HPF Final    Mucus, Urine 11/15/2023 1+  Reference range not established. /LPF Final    Hyaline Casts, Urine 11/15/2023 1+ (A)  NONE /LPF Final    BNP 11/15/2023 319 (H)  0 - 99 pg/mL Final    Troponin I, High Sensitivity 11/15/2023 38  0 - 53 ng/L Final    Sodium, Urine Random 11/15/2023 65  mmol/L Final    Sodium/Creatinine Ratio 11/15/2023 56  Not established. mmol/g Creat Final    Potassium, Urine Random 11/15/2023 52  mmol/L Final    Potassium/Creatinine Ratio 11/15/2023 45  Not established mmol/g Creat Final    Chloride, Urine Random 11/15/2023 89  mmol/L Final    Chloride/Creatinine Ratio 11/15/2023 77  23 - 275 mmol/g creat Final    Creatinine, Urine Random 11/15/2023 115.5  20.0 - 370.0 mg/dL Final    POCT Glucose 11/16/2023 255 (H)  74 - 99 mg/dL Final   Lab on 11/13/2023   Component Date Value Ref Range Status    Beta-2 Microglobulin 11/13/2023 8.3 (H)  0.7 - 2.2 mg/L Final    Glucose 11/13/2023 180 (H)  74 - 99 mg/dL Final    Sodium 11/13/2023 138  136 - 145 mmol/L Final    Potassium  11/13/2023 4.7  3.5 - 5.3 mmol/L Final    Chloride 11/13/2023 110 (H)  98 - 107 mmol/L Final    Bicarbonate 11/13/2023 24  21 - 32 mmol/L Final    Anion Gap 11/13/2023 9 (L)  10 - 20 mmol/L Final    Urea Nitrogen 11/13/2023 40 (H)  6 - 23 mg/dL Final    Creatinine 11/13/2023 2.87 (H)  0.50 - 1.30 mg/dL Final    eGFR 11/13/2023 23 (L)  >60 mL/min/1.73m*2 Final    Calculations of estimated GFR are performed using the 2021 CKD-EPI Study Refit equation without the race variable for the IDMS-Traceable creatinine methods.  https://jasn.asnjournals.org/content/early/2021/09/22/ASN.5132565600    Calcium 11/13/2023 8.7  8.6 - 10.6 mg/dL Final    Albumin 11/13/2023 3.1 (L)  3.4 - 5.0 g/dL Final    Alkaline Phosphatase 11/13/2023 76  33 - 136 U/L Final    Total Protein 11/13/2023 5.5 (L)  6.4 - 8.2 g/dL Final    AST 11/13/2023 15  9 - 39 U/L Final    Bilirubin, Total 11/13/2023 0.4  0.0 - 1.2 mg/dL Final    ALT 11/13/2023 31  10 - 52 U/L Final    Patients treated with Sulfasalazine may generate falsely decreased results for ALT.    WBC 11/13/2023 4.2 (L)  4.4 - 11.3 x10*3/uL Final    nRBC 11/13/2023 0.0  0.0 - 0.0 /100 WBCs Final    RBC 11/13/2023 3.48 (L)  4.50 - 5.90 x10*6/uL Final    Hemoglobin 11/13/2023 9.9 (L)  13.5 - 17.5 g/dL Final    Hematocrit 11/13/2023 30.4 (L)  41.0 - 52.0 % Final    MCV 11/13/2023 87  80 - 100 fL Final    MCH 11/13/2023 28.4  26.0 - 34.0 pg Final    MCHC 11/13/2023 32.6  32.0 - 36.0 g/dL Final    RDW 11/13/2023 17.0 (H)  11.5 - 14.5 % Final    Platelets 11/13/2023 137 (L)  150 - 450 x10*3/uL Final    Neutrophils % 11/13/2023 67.7  40.0 - 80.0 % Final    Immature Granulocytes %, Automated 11/13/2023 0.7  0.0 - 0.9 % Final    Immature Granulocyte Count (IG) includes promyelocytes, myelocytes and metamyelocytes but does not include bands. Percent differential counts (%) should be interpreted in the context of the absolute cell counts (cells/UL).    Lymphocytes % 11/13/2023 22.6  13.0 - 44.0 % Final     Monocytes % 11/13/2023 7.6  2.0 - 10.0 % Final    Eosinophils % 11/13/2023 1.2  0.0 - 6.0 % Final    Basophils % 11/13/2023 0.2  0.0 - 2.0 % Final    Neutrophils Absolute 11/13/2023 2.84  1.20 - 7.70 x10*3/uL Final    Percent differential counts (%) should be interpreted in the context of the absolute cell counts (cells/uL).    Immature Granulocytes Absolute, Au* 11/13/2023 0.03  0.00 - 0.70 x10*3/uL Final    Lymphocytes Absolute 11/13/2023 0.95 (L)  1.20 - 4.80 x10*3/uL Final    Monocytes Absolute 11/13/2023 0.32  0.10 - 1.00 x10*3/uL Final    Eosinophils Absolute 11/13/2023 0.05  0.00 - 0.70 x10*3/uL Final    Basophils Absolute 11/13/2023 0.01  0.00 - 0.10 x10*3/uL Final   Procedure Visit on 10/26/2023   Component Date Value Ref Range Status    Case Report 10/26/2023    Final                    Value:Surgical Pathology                                Case: [id number]                                  Authorizing Provider:  Elizabeth Alejandro MD     Collected:           10/26/2023 1400              Ordering Location:     [facility]   Received:            10/26/2023 1445              Pathologist:           Ana South MD                                                                Specimens:   A) - BONE MARROW CLOT, LEFT ILIAC CREST                                                             B) - BONE MARROW CORE BIOPSY, LEFT ILIAC CREST                                                      C) - BONE MARROW ASPIRATE, LEFT ILIAC CREST                                                Clinical History 10/26/2023    Final                    Value:This result contains rich text formatting which cannot be displayed here.    FINAL DIAGNOSIS 10/26/2023    Final                    Value:This result contains rich text formatting which cannot be displayed here.    Comment 10/26/2023    Final                    Value:This result contains rich text formatting which cannot be displayed here.    Bone Marrow  Differential 10/26/2023    Final                    Value:This result contains rich text formatting which cannot be displayed here.    Microscopic Description 10/26/2023    Final                    Value:This result contains rich text formatting which cannot be displayed here.    Gross Description 10/26/2023    Final                    Value:This result contains rich text formatting which cannot be displayed here.    Case Report 10/26/2023    Final                    Value:Flow Cytometry                                    Case: [id number]                                   Authorizing Provider:  Elizabeth Alejandro MD     Collected:           10/26/2023 1400              Ordering Location:     [facility]   Received:            10/26/2023 1445              Pathologist:           Ana South MD                                                                Specimen:    Bone Marrow Aspirate                                                                       Diagnosis 10/26/2023    Final                    Value:This result contains rich text formatting which cannot be displayed here.    Flow Test Ordered 10/26/2023 Acute Panel  not established Final    Specimen Viability 10/26/2023 Acceptable  not established Final    Cell Count 10/26/2023 18.23  not established x10*3/uL Final    Number of Cells Collected 10/26/2023    Final    This result contains rich text formatting which cannot be displayed here.    Methodology 10/26/2023    Final                    Value:This result contains rich text formatting which cannot be displayed here.    ISCN 10/26/2023    Final                    Value:FISH NEGATIVE for gain of 1q, hyperdiploidy of 3,7 and 11, rearrangement of IGH, and deletion of TP53    nuc mike(CDKN2C,CKS1B)x2[100],(CEP3,CEP7,CEP11)x2[100],(IGHx2)[50],(TP53,D17Z1)x2[100]      Cytogenetics Interpretation 10/26/2023    Final                    Value:This result contains rich text formatting which cannot be  displayed here.    Electronically signed and reported* 10/26/2023    Final                    Value:This result contains rich text formatting which cannot be displayed here.    Electronically co-signed by 10/26/2023    Final                    Value:This result contains rich text formatting which cannot be displayed here.   Lab on 10/26/2023   Component Date Value Ref Range Status    Cholesterol 10/26/2023 99  0 - 199 mg/dL Final          Age      Desirable   Borderline High   High     0-19 Y     0 - 169       170 - 199     >/= 200    20-24 Y     0 - 189       190 - 224     >/= 225         >24 Y     0 - 199       200 - 239     >/= 240   **All ranges are based on fasting samples. Specific   therapeutic targets will vary based on patient-specific   cardiac risk.    Pediatric guidelines reference:Pediatrics 2011, 128(S5).Adult guidelines reference: NCEP ATPIII Guidelines,DOMINIC 2001, 258:2486-97    Venipuncture immediately after or during the administration of Metamizole may lead to falsely low results. Testing should be performed immediately prior to Metamizole dosing.    HDL-Cholesterol 10/26/2023 48.6  mg/dL Final      Age       Very Low   Low     Normal    High    0-19 Y    < 35      < 40     40-45     ----  20-24 Y    ----     < 40      >45      ----        >24 Y      ----     < 40     40-60      >60      Cholesterol/HDL Ratio 10/26/2023 2.0   Final      Ref Values  Desirable  < 3.4  High Risk  > 5.0    LDL Calculated 10/26/2023 41  <=99 mg/dL Final                                Near   Borderline      AGE      Desirable  Optimal    High     High     Very High     0-19 Y     0 - 109     ---    110-129   >/= 130     ----    20-24 Y     0 - 119     ---    120-159   >/= 160     ----      >24 Y     0 -  99   100-129  130-159   160-189     >/=190      VLDL 10/26/2023 9  0 - 40 mg/dL Final    Triglycerides 10/26/2023 46  0 - 149 mg/dL Final       Age         Desirable   Borderline High   High     Very High   0 D-90 D     19 - 174         ----         ----        ----  91 D- 9 Y     0 -  74        75 -  99     >/= 100      ----    10-19 Y     0 -  89        90 - 129     >/= 130      ----    20-24 Y     0 - 114       115 - 149     >/= 150      ----         >24 Y     0 - 149       150 - 199    200- 499    >/= 500    Venipuncture immediately after or during the administration of Metamizole may lead to falsely low results. Testing should be performed immediately prior to Metamizole dosing.    Non HDL Cholesterol 10/26/2023 50  0 - 149 mg/dL Final          Age       Desirable   Borderline High   High     Very High     0-19 Y     0 - 119       120 - 144     >/= 145    >/= 160    20-24 Y     0 - 149       150 - 189     >/= 190      ----         >24 Y    30 mg/dL above LDL Cholesterol goal      Glucose 10/26/2023 273 (H)  74 - 99 mg/dL Final    Sodium 10/26/2023 141  136 - 145 mmol/L Final    Potassium 10/26/2023 4.8  3.5 - 5.3 mmol/L Final    Chloride 10/26/2023 108 (H)  98 - 107 mmol/L Final    Bicarbonate 10/26/2023 26  21 - 32 mmol/L Final    Anion Gap 10/26/2023 12  10 - 20 mmol/L Final    Urea Nitrogen 10/26/2023 37 (H)  6 - 23 mg/dL Final    Creatinine 10/26/2023 2.88 (H)  0.50 - 1.30 mg/dL Final    eGFR 10/26/2023 23 (L)  >60 mL/min/1.73m*2 Final    Calculations of estimated GFR are performed using the 2021 CKD-EPI Study Refit equation without the race variable for the IDMS-Traceable creatinine methods.  https://jasn.asnjournals.org/content/early/2021/09/22/ASN.9229802078    Calcium 10/26/2023 10.2  8.6 - 10.6 mg/dL Final    Phosphorus 10/26/2023 2.8  2.5 - 4.9 mg/dL Final    The performance characteristics of phosphorus testing in heparinized plasma have been validated by the individual  laboratory site where testing is performed. Testing on heparinized plasma is not approved by the FDA; however, such approval is not necessary.    Albumin 10/26/2023 3.2 (L)  3.4 - 5.0 g/dL Final    Hemoglobin A1C 10/26/2023 6.0 (H)  see below %  Final    Estimated Average Glucose 10/26/2023 126  Not Established mg/dL Final    Extra Tube 10/26/2023 Hold for add-ons.   Final    Auto resulted.    WBC 10/26/2023 3.9 (L)  4.4 - 11.3 x10*3/uL Final    nRBC 10/26/2023 0.0  0.0 - 0.0 /100 WBCs Final    RBC 10/26/2023 2.94 (L)  4.50 - 5.90 x10*6/uL Final    Hemoglobin 10/26/2023 8.3 (L)  13.5 - 17.5 g/dL Final    Hematocrit 10/26/2023 24.6 (L)  41.0 - 52.0 % Final    MCV 10/26/2023 84  80 - 100 fL Final    MCH 10/26/2023 28.2  26.0 - 34.0 pg Final    MCHC 10/26/2023 33.7  32.0 - 36.0 g/dL Final    RDW 10/26/2023 15.3 (H)  11.5 - 14.5 % Final    Platelets 10/26/2023 126 (L)  150 - 450 x10*3/uL Final    MPV 10/26/2023 10.7  7.5 - 11.5 fL Final    Neutrophils % 10/26/2023 79.2  40.0 - 80.0 % Final    Immature Granulocytes %, Automated 10/26/2023 0.5  0.0 - 0.9 % Final    Immature Granulocyte Count (IG) includes promyelocytes, myelocytes and metamyelocytes but does not include bands. Percent differential counts (%) should be interpreted in the context of the absolute cell counts (cells/UL).    Lymphocytes % 10/26/2023 13.8  13.0 - 44.0 % Final    Monocytes % 10/26/2023 5.4  2.0 - 10.0 % Final    Eosinophils % 10/26/2023 0.8  0.0 - 6.0 % Final    Basophils % 10/26/2023 0.3  0.0 - 2.0 % Final    Neutrophils Absolute 10/26/2023 3.11  1.20 - 7.70 x10*3/uL Final    Percent differential counts (%) should be interpreted in the context of the absolute cell counts (cells/uL).    Immature Granulocytes Absolute, Au* 10/26/2023 0.02  0.00 - 0.70 x10*3/uL Final    Lymphocytes Absolute 10/26/2023 0.54 (L)  1.20 - 4.80 x10*3/uL Final    Monocytes Absolute 10/26/2023 0.21  0.10 - 1.00 x10*3/uL Final    Eosinophils Absolute 10/26/2023 0.03  0.00 - 0.70 x10*3/uL Final    Basophils Absolute 10/26/2023 0.01  0.00 - 0.10 x10*3/uL Final   Office Visit on 10/25/2023   Component Date Value Ref Range Status    POC Fingerstick Blood Glucose 10/25/2023 348 (A)  70 - 100 mg/dl Final   Admission on  10/19/2023, Discharged on 10/20/2023   Component Date Value Ref Range Status    Ventricular Rate 10/19/2023 67  BPM Final    Atrial Rate 10/19/2023 67  BPM Final    MN Interval 10/19/2023 200  ms Final    QRS Duration 10/19/2023 144  ms Final    QT Interval 10/19/2023 420  ms Final    QTC Calculation(Bazett) 10/19/2023 443  ms Final    P Axis 10/19/2023 -17  degrees Final    R Axis 10/19/2023 70  degrees Final    T Axis 10/19/2023 21  degrees Final    QRS Count 10/19/2023 11  beats Final    Q Onset 10/19/2023 215  ms Final    P Onset 10/19/2023 115  ms Final    P Offset 10/19/2023 180  ms Final    T Offset 10/19/2023 425  ms Final    QTC Fredericia 10/19/2023 436  ms Final    WBC 10/19/2023 1.3 (L)  4.4 - 11.3 x10*3/uL Final    nRBC 10/19/2023 0.0  0.0 - 0.0 /100 WBCs Final    RBC 10/19/2023 2.38 (L)  4.50 - 5.90 x10*6/uL Final    Hemoglobin 10/19/2023 6.6 (L)  13.5 - 17.5 g/dL Final    Hematocrit 10/19/2023 18.4 (L)  41.0 - 52.0 % Final    MCV 10/19/2023 77 (L)  80 - 100 fL Final    MCH 10/19/2023 27.7  26.0 - 34.0 pg Final    MCHC 10/19/2023 35.9  32.0 - 36.0 g/dL Final    RDW 10/19/2023 15.8 (H)  11.5 - 14.5 % Final    Platelets 10/19/2023 15 (LL)  150 - 450 x10*3/uL Final    MPV 10/19/2023    Final    Not Measured    Immature Granulocytes %, Automated 10/19/2023 0.0  0.0 - 0.9 % Final    Immature Granulocyte Count (IG) includes promyelocytes, myelocytes and metamyelocytes but does not include bands. Percent differential counts (%) should be interpreted in the context of the absolute cell counts (cells/UL).    Neutrophils Absolute 10/19/2023    Final    Percent differential counts (%) should be interpreted in the context of the absolute cell counts (cells/uL).    Immature Granulocytes Absolute, Au* 10/19/2023 0.00  0.00 - 0.70 x10*3/uL Final    Glucose 10/19/2023 132 (H)  74 - 99 mg/dL Final    Sodium 10/19/2023 137  136 - 145 mmol/L Final    Potassium 10/19/2023 4.4  3.5 - 5.3 mmol/L Final    MILD HEMOLYSIS  DETECTED. The result may be falsely elevated due to hemolysis or other interferents. Clinical correlation is recommended. Repeat testing may be considered.    Chloride 10/19/2023 109 (H)  98 - 107 mmol/L Final    Bicarbonate 10/19/2023 23  21 - 32 mmol/L Final    Anion Gap 10/19/2023 9 (L)  10 - 20 mmol/L Final    Urea Nitrogen 10/19/2023 28 (H)  6 - 23 mg/dL Final    Creatinine 10/19/2023 2.74 (H)  0.50 - 1.30 mg/dL Final    eGFR 10/19/2023 25 (L)  >60 mL/min/1.73m*2 Final    Calculations of estimated GFR are performed using the 2021 CKD-EPI Study Refit equation without the race variable for the IDMS-Traceable creatinine methods.  https://jasn.asnjournals.org/content/early/2021/09/22/ASN.9746598057    Calcium 10/19/2023 10.0  8.6 - 10.6 mg/dL Final    Albumin 10/19/2023 3.1 (L)  3.4 - 5.0 g/dL Final    Alkaline Phosphatase 10/19/2023 61  33 - 136 U/L Final    Total Protein 10/19/2023 5.8 (L)  6.4 - 8.2 g/dL Final    AST 10/19/2023 19  9 - 39 U/L Final    MILD HEMOLYSIS DETECTED. The result may be falsely elevated due to hemolysis or other interferents. Clinical correlation is recommended. Repeat testing may be considered.    Bilirubin, Total 10/19/2023 0.5  0.0 - 1.2 mg/dL Final    ALT 10/19/2023 6 (L)  10 - 52 U/L Final    Patients treated with Sulfasalazine may generate falsely decreased results for ALT.    BNP 10/19/2023 174 (H)  0 - 99 pg/mL Final    Troponin I, High Sensitivity 10/19/2023 53  0 - 53 ng/L Final    Coronavirus 2019, PCR 10/19/2023 Not Detected  Not Detected Final    Flu A Result 10/19/2023 Not Detected  Not Detected Final    Flu B Result 10/19/2023 Not Detected  Not Detected Final    RSV PCR 10/19/2023 Not Detected  Not Detected Final    Blood Culture 10/19/2023 No growth at 4 days -  FINAL REPORT   Final    Blood Culture 10/19/2023 No growth at 4 days -  FINAL REPORT   Final    Color, Urine 10/19/2023 Straw  Straw, Yellow Final    Appearance, Urine 10/19/2023 Clear  Clear Final    Specific  Gravity, Urine 10/19/2023 1.009  1.005 - 1.035 Final    pH, Urine 10/19/2023 7.0  5.0, 5.5, 6.0, 6.5, 7.0, 7.5, 8.0 Final    Protein, Urine 10/19/2023 >=500 (3+) (N)  NEGATIVE mg/dL Final    Glucose, Urine 10/19/2023 NEGATIVE  NEGATIVE mg/dL Final    Blood, Urine 10/19/2023 SMALL (1+) (A)  NEGATIVE Final    Ketones, Urine 10/19/2023 NEGATIVE  NEGATIVE mg/dL Final    Bilirubin, Urine 10/19/2023 NEGATIVE  NEGATIVE Final    Urobilinogen, Urine 10/19/2023 <2.0  <2.0 mg/dL Final    Nitrite, Urine 10/19/2023 NEGATIVE  NEGATIVE Final    Leukocyte Esterase, Urine 10/19/2023 NEGATIVE  NEGATIVE Final    Extra Tube 10/19/2023 Hold for add-ons.   Final    Auto resulted.    Tacrolimus  10/19/2023 10.0  <=15.0 ng/mL Final    Lipase 10/19/2023 7 (L)  9 - 82 U/L Final    PRODUCT CODE 10/19/2023 [id number]   Final    Unit Number 10/19/2023 [id number]   Final    Unit ABO 10/19/2023 O   Final    Unit RH 10/19/2023 POS   Final    XM INTEP 10/19/2023 COMP   Final    Dispense Status 10/19/2023 TR   Final    Blood Expiration Date 10/19/2023 November 14, 2023 23:59 EST   Final    PRODUCT BLOOD TYPE 10/19/2023 5100   Final    UNIT VOLUME 10/19/2023 288   Final-Edited    Troponin I, High Sensitivity 10/19/2023 48  0 - 53 ng/L Final    ABO TYPE 10/19/2023 O   Final    Rh TYPE 10/19/2023 POS   Final    ANTIBODY SCREEN 10/19/2023 NEG   Final    WBC, Urine 10/19/2023 NONE  1-5, NONE /HPF Final    RBC, Urine 10/19/2023 1-2  NONE, 1-2, 3-5 /HPF Final    Squamous Epithelial Cells, Urine 10/19/2023 1-9 (SPARSE)  Reference range not established. /HPF Final    Parvovirus B19 IgG 10/20/2023 0.41  <=0.90 IV Final    INTERPRETIVE INFORMATION: Parvovirus B19 Antibody, IgG    0.90 IV or less .......... Negative - No significant                               level of detectable Parvovirus                               B19 IgG antibody.    0.91 - 1.09 IV ........... Equivocal - Repeat testing in                               7-21 days may be helpful.     1.10 IV or greater ....... Positive - IgG antibody to                               Parvovirus B19 detected which                               may indicate a current or                               past infection.    The best evidence for current infection is a significant change on   two appropriately timed specimens, where both tests are done in   the same laboratory at the same time.    Parvovirus B19 IgM 10/20/2023 0.10  <=0.90 IV Final    Comment: INTERPRETIVE INFORMATION: Parvovirus B19 Antibody, IgM    0.90 IV or less .......... Negative - No significant                               level of detectable Parvovirus                               B19 IgM antibody.    0.91 - 1.09 IV ........... Equivocal - Repeat testing in                               7-21 days may be helpful.    1.10 IV or greater ........ Positive - IgM antibody to                               Parvovirus B19 detected which                               may indicate a current or                               recent infection. However, low                               levels of IgM antibodies may                               occasionally persist for more                               than 12 months post-infection.    The best evidence for current infection is a significant change on   two appropriately timed specimens, where both tests are done in   the same laboratory at the same time.    Appearance of an IgM antibody response normally occurs 7 to 14   days                            after the onset of disease. Testing immediately post-exposure   is of no value without a later convalescent specimen. A residual   IgM response may be distinguished from early IgM response to   infection by testing sera from patients three to four weeks later   for changing levels of specific IgM antibodies.  Performed By: [facility]  [address] [place] [id number]  : Brian Hoyt MD, PhD  CLIA Number: [id number]     "LDH 10/18/2023 156  84 - 246 U/L Final    Haptoglobin 10/18/2023    Final    See MISC testing.    Retic % 10/20/2023 0.8  0.5 - 2.0 % Final    Retic Absolute 10/20/2023 0.024  0.017 - 0.110 x10*6/uL Final    Reticulocyte Hemoglobin 10/20/2023 32  28 - 38 pg Final    Immature Retic fraction 10/20/2023 3.5  <=16.0 % Final    Reticulocytes are measured based on a fluorescent technique. The IRF, or immature reticulocyte fraction, is the percent of reticulocytes that show medium (MFR) or high (HFR) fluorescence.  This value can be used to assess the relative maturity of the reticulocyte population in response to anemia. The \"shift reticulocytes\" are not measured by this technique, eliminating the need for their correction in the reticulocyte index.    Fibrinogen 10/20/2023 250  200 - 400 mg/dL Final    D-Dimer Non VTE, Quant (ng/mL FEU) 10/20/2023 632 (H)  <=500 ng/mL FEU Final    Protime 10/20/2023 13.9 (H)  9.8 - 12.8 seconds Final    INR 10/20/2023 1.2 (H)  0.9 - 1.1 Final    aPTT 10/20/2023 34  27 - 38 seconds Final    Haptoglobin 10/20/2023    Final    See misc testing    LDH 10/19/2023 144  84 - 246 U/L Final    WBC 10/19/2023 2.7 (L)  4.4 - 11.3 x10*3/uL Final    nRBC 10/19/2023 0.0  0.0 - 0.0 /100 WBCs Final    RBC 10/19/2023 3.17 (L)  4.50 - 5.90 x10*6/uL Final    Hemoglobin 10/19/2023 8.7 (L)  13.5 - 17.5 g/dL Final    Hematocrit 10/19/2023 26.3 (L)  41.0 - 52.0 % Final    MCV 10/19/2023 83  80 - 100 fL Final    MCH 10/19/2023 27.4  26.0 - 34.0 pg Final    MCHC 10/19/2023 33.1  32.0 - 36.0 g/dL Final    RDW 10/19/2023 15.4 (H)  11.5 - 14.5 % Final    Platelets 10/19/2023 101 (L)  150 - 450 x10*3/uL Final    MPV 10/19/2023 11.2  7.5 - 11.5 fL Final    WBC 10/20/2023 2.6 (L)  4.4 - 11.3 x10*3/uL Final    nRBC 10/20/2023 0.0  0.0 - 0.0 /100 WBCs Final    RBC 10/20/2023 2.89 (L)  4.50 - 5.90 x10*6/uL Final    Hemoglobin 10/20/2023 8.2 (L)  13.5 - 17.5 g/dL Final    Hematocrit 10/20/2023 24.4 (L)  41.0 - 52.0 % " Final    MCV 10/20/2023 84  80 - 100 fL Final    MCH 10/20/2023 28.4  26.0 - 34.0 pg Final    MCHC 10/20/2023 33.6  32.0 - 36.0 g/dL Final    RDW 10/20/2023 15.6 (H)  11.5 - 14.5 % Final    Platelets 10/20/2023 92 (L)  150 - 450 x10*3/uL Final    MPV 10/20/2023 9.8  7.5 - 11.5 fL Final    Neutrophils % 10/20/2023 54.3  40.0 - 80.0 % Final    Immature Granulocytes %, Automated 10/20/2023 0.4  0.0 - 0.9 % Final    Immature Granulocyte Count (IG) includes promyelocytes, myelocytes and metamyelocytes but does not include bands. Percent differential counts (%) should be interpreted in the context of the absolute cell counts (cells/UL).    Lymphocytes % 10/20/2023 27.3  13.0 - 44.0 % Final    Monocytes % 10/20/2023 13.3  2.0 - 10.0 % Final    Eosinophils % 10/20/2023 4.3  0.0 - 6.0 % Final    Basophils % 10/20/2023 0.4  0.0 - 2.0 % Final    Neutrophils Absolute 10/20/2023 1.39  1.20 - 7.70 x10*3/uL Final    Percent differential counts (%) should be interpreted in the context of the absolute cell counts (cells/uL).    Immature Granulocytes Absolute, Au* 10/20/2023 0.01  0.00 - 0.70 x10*3/uL Final    Lymphocytes Absolute 10/20/2023 0.70 (L)  1.20 - 4.80 x10*3/uL Final    Monocytes Absolute 10/20/2023 0.34  0.10 - 1.00 x10*3/uL Final    Eosinophils Absolute 10/20/2023 0.11  0.00 - 0.70 x10*3/uL Final    Basophils Absolute 10/20/2023 0.01  0.00 - 0.10 x10*3/uL Final    Glucose 10/20/2023 230 (H)  74 - 99 mg/dL Final    Sodium 10/20/2023 138  136 - 145 mmol/L Final    Potassium 10/20/2023 4.5  3.5 - 5.3 mmol/L Final    Chloride 10/20/2023 107  98 - 107 mmol/L Final    Bicarbonate 10/20/2023 25  21 - 32 mmol/L Final    Anion Gap 10/20/2023 11  10 - 20 mmol/L Final    Urea Nitrogen 10/20/2023 32 (H)  6 - 23 mg/dL Final    Creatinine 10/20/2023 2.63 (H)  0.50 - 1.30 mg/dL Final    eGFR 10/20/2023 26 (L)  >60 mL/min/1.73m*2 Final    Calculations of estimated GFR are performed using the 2021 CKD-EPI Study Refit equation without the  race variable for the IDMS-Traceable creatinine methods.  https://jasn.asnjournals.org/content/early/2021/09/22/ASN.4739687253    Calcium 10/20/2023 9.8  8.6 - 10.6 mg/dL Final    Phosphorus 10/20/2023 3.2  2.5 - 4.9 mg/dL Final    The performance characteristics of phosphorus testing in heparinized plasma have been validated by the individual  laboratory site where testing is performed. Testing on heparinized plasma is not approved by the FDA; however, such approval is not necessary.    Albumin 10/20/2023 3.0 (L)  3.4 - 5.0 g/dL Final    Magnesium 10/20/2023 1.53 (L)  1.60 - 2.40 mg/dL Final    Pathologist Review-CBC Differential 10/19/2023    Final                    Value:Marked thrombocytopenia. No platelet clumps seen. Microcytic anemia with anisopoikilocytosis, ovalocytes, and occasional schistocytes. Leukopenia. Recommend correlation with clinical history, CBC trends, and bone marrow biopsy (planned per discharge summary).        Electronically signed out by Brittanie Brown MD PhD on 10/23/23 at 12:26 PM.  By the signature on this report, the individual or group listed as making the Final Interpretation/Diagnosis certifies that they have reviewed this case.    Tacrolimus  10/20/2023 9.3  <=15.0 ng/mL Final    Ig Kappa Free Light Chain 10/18/2023 8.23 (H)  0.33 - 1.94 mg/dL Final    Ig Lambda Free Light Chain 10/18/2023 6.40 (H)  0.57 - 2.63 mg/dL Final    Kappa/Lambda Ratio 10/18/2023 1.29  0.26 - 1.65 Final    LDH 10/18/2023 197  84 - 246 U/L Final    POCT Glucose 10/20/2023 207 (H)  74 - 99 mg/dL Final    IgG 10/20/2023 1,030  700 - 1,600 mg/dL Final    IgA 10/20/2023 523 (H)  70 - 400 mg/dL Final    IgM 10/20/2023 20 (L)  40 - 230 mg/dL Final    IgE 10/20/2023 48  0 - 214 IU/mL Final    Total Protein 10/18/2023 5.5 (L)  6.4 - 8.2 g/dL Final    Albumin 10/18/2023 3.2 (L)  3.4 - 5.0 g/dL Final    Alpha 1 Globulin 10/18/2023 0.2  0.2 - 0.6 g/dL Final    Alpha 2 Globulin 10/18/2023 0.5  0.4 - 1.1 g/dL Final     Beta Globulin 10/18/2023 0.9  0.5 - 1.2 g/dL Final    Gamma 10/18/2023 0.9  0.5 - 1.4 g/dL Final    M-PROTEIN 1 10/18/2023 0.2 (H)    g/dL Final    M-PROTEIN 2 10/18/2023 0.1 (H)    g/dL Final    M-PROTEIN 3 10/18/2023 0.1 (H)    g/dL Final    Protein Electrophoresis Comment 10/18/2023 Aberrant bands detected. See immunofixation.    Hypoalbuminemia.      Final    Immunofixation Comment 10/18/2023    Final                    Value:Known monoclonal IgA lambda in the beta region at 0.2 g/dL, monoclonal IgG lambda in the gamma region at 0.1 g/dL, and monoclonal IgG kappa in the gamma region at 0.1 g/dL. The former two bands were last detected on 10/18/22 at 0.1 g/dL and 0.1 g/dL, respectively.       Path Review - Serum Protein Electr* 10/18/2023 Reviewed and approved by LOVE SHEPARD on 10/24/23 at 5:16 PM.       Final    Path Review - Serum Immunofixation 10/18/2023 Reviewed and approved by LOVE SHEPARD on 10/24/23 at 5:16 PM.       Final    Scan Result 10/20/2023 See Scanned Result (L)   Final    POCT Glucose 10/20/2023 128 (H)  74 - 99 mg/dL Final    POCT Glucose 10/20/2023 240 (H)  74 - 99 mg/dL Final   Lab on 10/18/2023   Component Date Value Ref Range Status    WBC 10/18/2023 2.1 (L)  4.4 - 11.3 x10*3/uL Final    nRBC 10/18/2023 0.0  0.0 - 0.0 /100 WBCs Final    RBC 10/18/2023 2.88 (L)  4.50 - 5.90 x10*6/uL Final    Hemoglobin 10/18/2023 8.1 (L)  13.5 - 17.5 g/dL Final    Hematocrit 10/18/2023 24.3 (L)  41.0 - 52.0 % Final    MCV 10/18/2023 84  80 - 100 fL Final    MCH 10/18/2023 28.1  26.0 - 34.0 pg Final    MCHC 10/18/2023 33.3  32.0 - 36.0 g/dL Final    RDW 10/18/2023 16.1 (H)  11.5 - 14.5 % Final    Platelets 10/18/2023 115 (L)  150 - 450 x10*3/uL Final    MPV 10/18/2023 10.6  7.5 - 11.5 fL Final    Neutrophils % 10/18/2023 45.8  40.0 - 80.0 % Final    Immature Granulocytes %, Automated 10/18/2023 0.0  0.0 - 0.9 % Final    Immature Granulocyte Count (IG) includes promyelocytes, myelocytes and  metamyelocytes but does not include bands. Percent differential counts (%) should be interpreted in the context of the absolute cell counts (cells/UL).    Lymphocytes % 10/18/2023 35.0  13.0 - 44.0 % Final    Monocytes % 10/18/2023 11.2  2.0 - 10.0 % Final    Eosinophils % 10/18/2023 7.5  0.0 - 6.0 % Final    Basophils % 10/18/2023 0.5  0.0 - 2.0 % Final    Neutrophils Absolute 10/18/2023 0.98 (L)  1.20 - 7.70 x10*3/uL Final    Percent differential counts (%) should be interpreted in the context of the absolute cell counts (cells/uL).    Immature Granulocytes Absolute, Au* 10/18/2023 0.00  0.00 - 0.70 x10*3/uL Final    Lymphocytes Absolute 10/18/2023 0.75 (L)  1.20 - 4.80 x10*3/uL Final    Monocytes Absolute 10/18/2023 0.24  0.10 - 1.00 x10*3/uL Final    Eosinophils Absolute 10/18/2023 0.16  0.00 - 0.70 x10*3/uL Final    Basophils Absolute 10/18/2023 0.01  0.00 - 0.10 x10*3/uL Final    Glucose 10/18/2023 153 (H)  74 - 99 mg/dL Final    Sodium 10/18/2023 139  136 - 145 mmol/L Final    Potassium 10/18/2023 4.3  3.5 - 5.3 mmol/L Final    Chloride 10/18/2023 106  98 - 107 mmol/L Final    Bicarbonate 10/18/2023 31  21 - 32 mmol/L Final    Anion Gap 10/18/2023 <7 (L)  10 - 20 mmol/L Final    Urea Nitrogen 10/18/2023 28 (H)  6 - 23 mg/dL Final    Creatinine 10/18/2023 2.57 (H)  0.50 - 1.30 mg/dL Final    eGFR 10/18/2023 27 (L)  >60 mL/min/1.73m*2 Final    Calculations of estimated GFR are performed using the 2021 CKD-EPI Study Refit equation without the race variable for the IDMS-Traceable creatinine methods.  https://jasn.asnjournals.org/content/early/2021/09/22/ASN.8797732626    Calcium 10/18/2023 9.7  8.6 - 10.6 mg/dL Final    Albumin 10/18/2023 3.1 (L)  3.4 - 5.0 g/dL Final    Alkaline Phosphatase 10/18/2023 65  33 - 136 U/L Final    Total Protein 10/18/2023 5.2 (L)  6.4 - 8.2 g/dL Final    AST 10/18/2023 14  9 - 39 U/L Final    Bilirubin, Total 10/18/2023 0.5  0.0 - 1.2 mg/dL Final    ALT 10/18/2023 7 (L)  10 - 52 U/L  Final    Patients treated with Sulfasalazine may generate falsely decreased results for ALT.    Iron 10/18/2023 41  35 - 150 ug/dL Final    UIBC 10/18/2023 129  110 - 370 ug/dL Final    TIBC 10/18/2023 170 (L)  240 - 445 ug/dL Final    % Saturation 10/18/2023 24 (L)  25 - 45 % Final    Hepatitis C RNA PCR Log 10/18/2023    Final    Not calculated    HCV RNA Result 10/18/2023 Not Detected  Not detected Final    Methylmalonic Acid, S 10/18/2023 0.52 (H)  0.00 - 0.40 umol/L Final      Slight elevation 0.41-0.99 umol/L         Consistent with mild vitamin B12 deficiency, renal         insufficiency, or intravascular volume contraction.    Moderate elevation 1.00-9.99 umol/L          Consistent with mild vitamin B12 deficiency.    Massive elevation - Greater than or equal to 10 umol/L          Consistent with significant vitamin B12 deficiency          or with inborn errors of metabolism.  INTERPRETIVE INFORMATION: MMA Serum/Plasma,                             Vitamin B12 Status    This test was developed and its performance characteristics   determined by [facility]. It has not been cleared or   approved by the US Food and Drug Administration. This test was   performed in a CLIA certified laboratory and is intended for   clinical purposes.  Performed By: [facility]  [address] [place] [id number]  : Brian Hoyt MD, PhD  CLIA Number: [id number]    Retic % 10/18/2023 1.2  0.5 - 2.0 % Final    Retic Absolute 10/18/2023 0.033  0.017 - 0.110 x10*6/uL Final    Reticulocyte Hemoglobin 10/18/2023 32  28 - 38 pg Final    Immature Retic fraction 10/18/2023 3.5  <=16.0 % Final    Reticulocytes are measured based on a fluorescent technique. The IRF, or immature reticulocyte fraction, is the percent of reticulocytes that show medium (MFR) or high (HFR) fluorescence.  This value can be used to assess the relative maturity of the reticulocyte population in response to anemia. The  "\"shift reticulocytes\" are not measured by this technique, eliminating the need for their correction in the reticulocyte index.    Cytomegalovirus DNA, PCR Log IU/ML 10/18/2023    Final    Not calculated    CMV DNA Result 10/18/2023 Not Detected  Not Detected Final    EBV DNA Result 10/18/2023 Not Detected  Not Detected Final    EBV PCR Plasma Log 10/18/2023    Final    Not calculated    HIV 1/2 Antigen/Antibody Screen wi* 10/18/2023 Nonreactive  Nonreactive Final    Prostate Specific AG 10/18/2023 <0.10  <=4.00 ng/mL Final    Scan Result 10/18/2023 See Scanned Result   Final   No results displayed because visit has over 200 results.      Orders Only on 08/30/2023   Component Date Value Ref Range Status    Tacrolimus Lvl 08/30/2023 6.9  2.0 - 15.0 ng/mL Final    Comment:  NOTE: Result was obtained using a    chemiluminescent microparticle immunoassay   (CMIA) on the  i system.    Optimal therapeutic ranges for immuno-  suppressant drugs depend upon an individual  patient's current clinical state, type of  organ transplant, time post-transplant,  co-administration of other immunosuppressants,  and other clinical factors. The results of  this test should be correlated with additional  clinical and laboratory data before changes  in treatment regimens are made.      WBC 08/30/2023 4.0 (L)  4.4 - 11.3 x10E9/L Final    nRBC 08/30/2023 0.0  0.0 - 0.0 /100 WBC Final    RBC 08/30/2023 3.26 (L)  4.50 - 5.90 x10E12/L Final    Hemoglobin 08/30/2023 9.0 (L)  13.5 - 17.5 g/dL Final    Hematocrit 08/30/2023 28.0 (L)  41.0 - 52.0 % Final    MCV 08/30/2023 86  80 - 100 fL Final    MCHC 08/30/2023 32.1  32.0 - 36.0 g/dL Final    Platelets 08/30/2023 201  150 - 450 x10E9/L Final    RDW 08/30/2023 13.2  11.5 - 14.5 % Final    Glucose 08/30/2023 175 (H)  74 - 99 mg/dL Final    Sodium 08/30/2023 140  136 - 145 mmol/L Final    Potassium 08/30/2023 4.4  3.5 - 5.3 mmol/L Final    Chloride 08/30/2023 106  98 - 107 mmol/L Final    " Bicarbonate 08/30/2023 27  21 - 32 mmol/L Final    Anion Gap 08/30/2023 11  10 - 20 mmol/L Final    Urea Nitrogen 08/30/2023 49 (H)  6 - 23 mg/dL Final    Creatinine 08/30/2023 3.41 (H)  0.50 - 1.30 mg/dL Final    GFR MALE 08/30/2023 19 (A)  >90 mL/min/1.73m2 Final    Comment:  CALCULATIONS OF ESTIMATED GFR ARE PERFORMED   USING THE 2021 CKD-EPI STUDY REFIT EQUATION   WITHOUT THE RACE VARIABLE FOR THE IDMS-TRACEABLE   CREATININE METHODS.    https://jasn.asnjournals.org/content/early/2021/09/22/ASN.8869848310      Calcium 08/30/2023 10.1  8.6 - 10.6 mg/dL Final    Phosphorus 08/30/2023 3.3  2.5 - 4.9 mg/dL Final    Comment:  The performance characteristics of phosphorus testing in   heparinized plasma have been validated by the individual     laboratory site where testing is performed. Testing    on heparinized plasma is not approved by the FDA;    however, such approval is not necessary.      Albumin 08/30/2023 3.5  3.4 - 5.0 g/dL Final         Assessment/Plan   Manolo Bashir is a 68 y.o. male with PMHX of ESRD s/p 2 renal transplants (1992 followed by 2013), CKD 3, DM2, PRCA s/p rdical prostatectomy and HCV presented to the ED on 11/15 with chief complaint of difficulty urinating for the last few days along with increased swelling in the lower extremities. Labs are significant for an ISIAH on CKD for which Transplant nephrology has been consulted.    - Baseline Cr 2.6-2.8, Cr elevated to 3.42 yesterday. Pending repeat AM labs today  - UA - 3+ protein, 2+ glucose, negative ketones, negative nitrites, negative WBC's and negative RBC's   - FeNa 1.3% - did not receive lasix.   - CT A/P without any evidence of hydro. Did receive rocephin due to suspicion for possible pyelonephritis   - no episode of hypotension, however, has been running hypertensive  - non oliguric    - no IV contrast or nephrotoxic medications    1) Allograft function: ISIAH on CKD 3b-4  - will follow up on repeat labs, monitor strict I's and O's and  avoid nephrotoxic medications   - renally dose for eGFR < 30  - can use lasix if needed for more volume management     2) Immunosuppression:  - daily tacrolimus levels   - continue tacrolimus 2.5 BID, prednisone 10 mg daily, azathioprine   - will make adjustments based on levels     3) Volume/HtN  - Euvolemic, but hypertensive   - Carvedilol 25mg po bid --> can consider increasing for improved BP   - Hydralazine 25mg po tid   - hold losartan 25 daily in setting of ISIAH   - continue amlodipine 10 mg faily      4) Electrolytes  - K 5.5, Na 144  - recommend repeat RFP  - if K remains elevated, recommend lokelma 10 daily     5) Acid base  -HCO3 24     6) CKD-Anemia  -HGB 9.4  -workup in progress given recent biopsy     7) CKD MBD  - calcium 8.8, albumin 2.9, phos 2.8    Crystal Newman DO.      Manolo Bashir is a 68 y.o. male with PMHX of ESRD s/p 2 renal transplants (1992 followed by 2013), CKD 3, DM2, PRCA s/p rdical prostatectomy and HCV presented to the ED on 11/15 with chief complaint of difficulty urinating for the last few days along with increased swelling in the lower extremities .    -Presented with decreased urine output.  Urine analysis showing +3protein and no signs of infection.  But CAT scan showing mild stranding does not look like a urinary tract infection but follow-up with the cultures before discontinuing antibiotics.  Urine lites corresponding to intrinsic renal disease.  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 The former two bands were last detected on 10/18/22 at 0.1 g/dL and 0.1 g/dL.  Seems kidney function stabilizing.  -Please consider oncology evaluation while the patient got admitted likely need PET/CT to evaluate for any myeloma focus VS metastasis  -Please check UPC as well as a serum free light chains with kappa lambda ratio.  -He is  consider repeating echocardiogram.  To evaluate current cardiac status.  -Consider holding azathioprine and continue with prednisone and tacrolimus.  Please consider checking trough tacrolimus levels.  -Please check EBV PCR plasma and BK levels.  We will also order DSA.  -Agree with Lokelma for hyperkalemia.  Also consider Lasix at 40 mg IV daily and follow-up electrolytes very closely.  -Blood pressure needs better control currently on amlodipine, Coreg, hydralazine.  Can uptitrate hydralazine and Coreg if needed.

## 2023-11-16 NOTE — ED PROCEDURE NOTE
Procedure    Performed by: Ceasar Francisco MD  Authorized by: Raven Calix DO        Genitourinary Indications: dysuria          Procedure: Renal Ultrasound    Findings:  Right Kidney: sp nephrectomy  Left Kidney: The LEFT kidney was visualized and was NEGATIVE for hydronephrosis. and abd transplant  Bladder: The bladder was visualized and was DECOMPRESSED.    Impression:  Renal: normal exam    Comments: Low volume in bladder, transplanted kidney w/o hydro, good color flow               Ceasar Francisco MD  Resident  11/15/23 [id number]

## 2023-11-16 NOTE — H&P
"History Of Present Illness  Manolo Bashir is a 68 y.o. male with a PMHx of ESRD s/p renal transplant (1992, 2013 (left kidney)), HTN, TIIDM, and prior HCV presenting with several days of anuria, dysuria, 9/10 sharp back pain radiating to abdomen, and LE swelling.     Pt states back pain has been ongoing for several months but acute worsened after a bone marrow biopsy on 10/26 after which he has had continued radiating back pain. Additionally, pt noted decreased urine production that started on 11/12 and pain spreading towards his abdomen and suprapubic region. With this, pt noted overall abdominal swelling and lower extremity swelling. He states the only medication that helped this pain was tramadol. Pt did note he also had recent cholecystectomy \"a few months ago\". Pt otherwise denied fevers, chills, chest pain, SOB, diarrhea, constipation, hematuria, or nausea/vomiting.    ED Course:  -/85, 97 RA, 74 HR, T 36.8, 16 rr  -20 hydral then 25 hydralazine given for BP control  -POCUS showed no hydronephrosis and decompressed bladder  -CTA/P showed mild fat stranding adjacent to the transplant kidney in the setting of anasarca. Correlate clinical evidence of pyelonephritis.  -Pt started on IV ceftriaxone     Pt noted improved urine output in ED prior to lasix administration.    Past Medical History  Past Medical History:   Diagnosis Date    Chronic kidney disease, stage 3 unspecified (CMS/HCC) 09/26/2018    Stage 3 chronic kidney disease    COVID-19 06/18/2020    COVID-19 virus infection    Diabetes (CMS/HCC)     HTN (hypertension)     Other long term (current) drug therapy 07/20/2021    High risk medication use    Personal history of other diseases of the circulatory system     Personal history of cardiac murmur    Personal history of other infectious and parasitic diseases 08/17/2015    History of hepatitis    Polyp, colonic 08/17/2023    Primary osteoarthritis of both ankles 08/17/2023    Tubular adenoma of " colon 08/17/2023    Unspecified kidney failure 08/17/2016    Renal failure       Surgical History  Past Surgical History:   Procedure Laterality Date    ILEOSTOMY  04/25/2017    Ileostomy    ILEOSTOMY CLOSURE  08/17/2015    Ileostomy Closure    OTHER SURGICAL HISTORY  04/21/2017    Right Hemicolectomy    OTHER SURGICAL HISTORY  08/17/2015    Arteriovenous Surgery Creation Of A-V Fistula    OTHER SURGICAL HISTORY  08/17/2015    Sigmoidoscopy (Fiberoptic, Therapeutic )    PROSTATECTOMY  10/11/2013    Prostatectomy Radical    TRANSPLANT, KIDNEY, OPEN  1992    TRANSPLANT, KIDNEY, OPEN  2013    US GUIDED PERCUTANEOUS PERITONEAL OR RETROPERITONEAL FLUID COLLECTION DRAINAGE  10/20/2022    US GUIDED PERCUTANEOUS PERITONEAL OR RETROPERITONEAL FLUID COLLECTION DRAINAGE 10/20/2022 [facility]        Social History  He reports that he has never smoked. He has never used smokeless tobacco. He reports current alcohol use. He reports current drug use. Drug: Oxycodone.    Family History  Family History   Problem Relation Name Age of Onset    Bone cancer Mother      Other (corona's sarcome of the bone marrow) Mother      Prostate cancer Father      Diabetes Other Family Hist     Hypertension Other Family Hist         Allergies  Patient has no known allergies.    Review of Systems   Constitutional:  Negative for activity change, appetite change, chills, fatigue, fever and unexpected weight change.   HENT: Negative.     Respiratory: Negative.     Cardiovascular: Negative.    Gastrointestinal:  Positive for abdominal distention and abdominal pain. Negative for anal bleeding, blood in stool, constipation, diarrhea, nausea, rectal pain and vomiting.   Genitourinary:  Positive for decreased urine volume, difficulty urinating, dysuria and urgency. Negative for enuresis, flank pain, frequency and hematuria.   Musculoskeletal:  Positive for back pain. Negative for joint swelling, myalgias, neck pain and neck stiffness.   Skin:   Positive for rash.   Neurological: Negative.         Physical Exam  Constitutional:       General: He is not in acute distress.     Appearance: Normal appearance. He is normal weight.   HENT:      Head: Normocephalic and atraumatic.      Nose: Nose normal.      Mouth/Throat:      Mouth: Mucous membranes are moist.      Pharynx: Oropharynx is clear.   Eyes:      Extraocular Movements: Extraocular movements intact.      Conjunctiva/sclera: Conjunctivae normal.      Pupils: Pupils are equal, round, and reactive to light.   Cardiovascular:      Rate and Rhythm: Normal rate and regular rhythm.      Pulses: Normal pulses.      Heart sounds: Normal heart sounds. No murmur heard.     No friction rub. No gallop.   Pulmonary:      Effort: Pulmonary effort is normal. No respiratory distress.      Breath sounds: Normal breath sounds. No wheezing, rhonchi or rales.   Chest:      Chest wall: No tenderness.   Abdominal:      General: Abdomen is flat. Bowel sounds are normal. There is distension.      Palpations: Abdomen is soft. There is no mass.      Tenderness: There is abdominal tenderness. There is no right CVA tenderness, left CVA tenderness, guarding or rebound.      Hernia: No hernia is present.      Comments: Suprapubic tenderness, tenderness over site of transplant kidney   Musculoskeletal:         General: Tenderness present. No swelling or deformity. Normal range of motion.      Right lower leg: Edema present.      Left lower leg: Edema present.      Comments: Mid-low back pain   Skin:     General: Skin is warm and dry.      Capillary Refill: Capillary refill takes less than 2 seconds.      Findings: Rash present.      Comments: Macular rash over site of bone biopsy in thoracic region of back.   Neurological:      General: No focal deficit present.      Mental Status: He is alert and oriented to person, place, and time. Mental status is at baseline.   Psychiatric:         Mood and Affect: Mood normal.         Behavior:  "Behavior normal.         Thought Content: Thought content normal.         Judgment: Judgment normal.          Last Recorded Vitals  Blood pressure (!) 184/93, pulse 65, temperature 36.8 °C (98.2 °F), temperature source Temporal, resp. rate 16, height 1.727 m (5' 8\"), weight 75.3 kg (166 lb), SpO2 100 %.    Relevant Results    Scheduled medications  acetaminophen, 975 mg, oral, q8h  amLODIPine, 10 mg, oral, Daily  [Held by provider] azaTHIOprine, 25 mg, oral, Daily  carvedilol, 25 mg, oral, BID  [START ON 11/17/2023] cefTRIAXone, 1 g, intravenous, q24h  cholecalciferol, 2,000 Units, oral, Daily  cinacalcet, 30 mg, oral, Daily  ferrous sulfate (325 mg ferrous sulfate), 65 mg of iron, oral, BID  hydrALAZINE, 25 mg, oral, TID  insulin glargine, 12 Units, subcutaneous, Daily  insulin lispro, 0-10 Units, subcutaneous, TID with meals  labetaloL, , ,   lidocaine, 1 patch, transdermal, Daily  loratadine, 10 mg, oral, Daily  [Held by provider] losartan, 25 mg, oral, Daily  pravastatin, 40 mg, oral, Nightly  predniSONE, 10 mg, oral, q AM  tacrolimus, 2.5 mg, oral, q12h ZOË      Continuous medications     PRN medications  PRN medications: dextrose 10 % in water (D10W), dextrose, docusate sodium, glucagon, labetaloL, polyethylene glycol    Results for orders placed or performed during the hospital encounter of 11/15/23 (from the past 24 hour(s))   Comprehensive metabolic panel   Result Value Ref Range    Glucose 220 (H) 74 - 99 mg/dL    Sodium 144 136 - 145 mmol/L    Potassium 5.5 (H) 3.5 - 5.3 mmol/L    Chloride 114 (H) 98 - 107 mmol/L    Bicarbonate 24 21 - 32 mmol/L    Anion Gap 12 10 - 20 mmol/L    Urea Nitrogen 43 (H) 6 - 23 mg/dL    Creatinine 3.42 (H) 0.50 - 1.30 mg/dL    eGFR 19 (L) >60 mL/min/1.73m*2    Calcium 8.8 8.6 - 10.6 mg/dL    Albumin 2.9 (L) 3.4 - 5.0 g/dL    Alkaline Phosphatase 74 33 - 136 U/L    Total Protein 5.5 (L) 6.4 - 8.2 g/dL    AST 14 9 - 39 U/L    Bilirubin, Total 0.4 0.0 - 1.2 mg/dL    ALT 24 10 - " 52 U/L   CBC and Auto Differential   Result Value Ref Range    WBC 3.9 (L) 4.4 - 11.3 x10*3/uL    nRBC 0.0 0.0 - 0.0 /100 WBCs    RBC 3.22 (L) 4.50 - 5.90 x10*6/uL    Hemoglobin 9.4 (L) 13.5 - 17.5 g/dL    Hematocrit 28.9 (L) 41.0 - 52.0 %    MCV 90 80 - 100 fL    MCH 29.2 26.0 - 34.0 pg    MCHC 32.5 32.0 - 36.0 g/dL    RDW 16.7 (H) 11.5 - 14.5 %    Platelets 121 (L) 150 - 450 x10*3/uL    Neutrophils % 81.9 40.0 - 80.0 %    Immature Granulocytes %, Automated 0.5 0.0 - 0.9 %    Lymphocytes % 12.4 13.0 - 44.0 %    Monocytes % 3.9 2.0 - 10.0 %    Eosinophils % 1.0 0.0 - 6.0 %    Basophils % 0.3 0.0 - 2.0 %    Neutrophils Absolute 3.16 1.20 - 7.70 x10*3/uL    Immature Granulocytes Absolute, Automated 0.02 0.00 - 0.70 x10*3/uL    Lymphocytes Absolute 0.48 (L) 1.20 - 4.80 x10*3/uL    Monocytes Absolute 0.15 0.10 - 1.00 x10*3/uL    Eosinophils Absolute 0.04 0.00 - 0.70 x10*3/uL    Basophils Absolute 0.01 0.00 - 0.10 x10*3/uL   Magnesium   Result Value Ref Range    Magnesium 1.91 1.60 - 2.40 mg/dL   Phosphorus   Result Value Ref Range    Phosphorus 2.8 2.5 - 4.9 mg/dL   B-type natriuretic peptide   Result Value Ref Range     (H) 0 - 99 pg/mL   Troponin I, High Sensitivity   Result Value Ref Range    Troponin I, High Sensitivity 38 0 - 53 ng/L   Urinalysis with Reflex Microscopic and Culture   Result Value Ref Range    Color, Urine Yellow Straw, Yellow    Appearance, Urine Hazy (N) Clear    Specific Gravity, Urine 1.024 1.005 - 1.035    pH, Urine 5.0 5.0, 5.5, 6.0, 6.5, 7.0, 7.5, 8.0    Protein, Urine >=500 (3+) (N) NEGATIVE mg/dL    Glucose, Urine 150 (2+) (A) NEGATIVE mg/dL    Blood, Urine SMALL (1+) (A) NEGATIVE    Ketones, Urine NEGATIVE NEGATIVE mg/dL    Bilirubin, Urine NEGATIVE NEGATIVE    Urobilinogen, Urine <2.0 <2.0 mg/dL    Nitrite, Urine NEGATIVE NEGATIVE    Leukocyte Esterase, Urine NEGATIVE NEGATIVE   Extra Urine Gray Tube   Result Value Ref Range    Extra Tube Hold for add-ons.    Urinalysis Microscopic    Result Value Ref Range    WBC, Urine 1-5 1-5, NONE /HPF    RBC, Urine 1-2 NONE, 1-2, 3-5 /HPF    Squamous Epithelial Cells, Urine 1-9 (SPARSE) Reference range not established. /HPF    Mucus, Urine 1+ Reference range not established. /LPF    Hyaline Casts, Urine 1+ (A) NONE /LPF   Urine electrolytes   Result Value Ref Range    Sodium, Urine Random 65 mmol/L    Sodium/Creatinine Ratio 56 Not established. mmol/g Creat    Potassium, Urine Random 52 mmol/L    Potassium/Creatinine Ratio 45 Not established mmol/g Creat    Chloride, Urine Random 89 mmol/L    Chloride/Creatinine Ratio 77 23 - 275 mmol/g creat    Creatinine, Urine Random 115.5 20.0 - 370.0 mg/dL   POCT GLUCOSE   Result Value Ref Range    POCT Glucose 255 (H) 74 - 99 mg/dL   POCT GLUCOSE   Result Value Ref Range    POCT Glucose 200 (H) 74 - 99 mg/dL     CT abdomen pelvis wo IV contrast   Final Result   1. Left pelvic transplant kidney. Mild fat stranding adjacent to the   transplant kidney in the setting of anasarca. Correlate for clinical   evidence of pyelonephritis.   2. Diffuse anasarca slightly worsened compared to prior examination   with trace perihepatic and perisplenic ascites. Redemonstration of   mesenteric edema. Findings suggestive of volume overload. Correlate   with patient's volume status.   3. Mild bladder wall thickening in the setting of underdistention,   correlate for evidence of cystitis.   4. Cardiomegaly and findings suggestive of mild edema.   5. Please see additional findings and discussion as above.        I personally reviewed the images/study and I agree with the findings   as stated.        MACRO:   None.        Signed by: Spencer Trejo 11/15/2023 9:26 PM   Dictation workstation:   [id number]      Point of Care Ultrasound   Final Result           Assessment/Plan   Principal Problem:    Pyelonephritis      Manolo Bashir is a 68 y.o. male with a PMHx of ESRD s/p renal transplant (1992, 2013 (left kidney)), HTN, TIIDM, and prior  HCV presenting with several days of anuria, dysuria, 9/10 sharp back pain radiating to abdomen, and LE swelling. Differential diagnosis includes pyelonephritis vs. Plasma cell disease leading to renal insult. Etiology of volume overload unclear. Lack of significant WBC increase, negative UA, and lack of urinary symptoms make pyelonephritis less likely. FeNa 1.3% suggesting intrinsic renal pathology. Pt previously with anuria and now making urine on admission.     #ISIAH on CKD3 (Scr 3.42, baseline 2.8)  #S/p renal transplant (1992, 2013)  ::POCUS ruled out hydronephrosis  ::CTA/P showing perinephric fat stranding and anasarca  ::FeNa 1.3%  -Pt having urine output in ED prior to Lasix  -Strict I/Os  -Nephrology consulted, appreciate recs  -Consider Lasix challenge if urine output slows  -Continue home prednisone 10 mg  -Continue home tacrolimus 2.5 mg  -Continue home cinacalcet 30 mg  -Continue home cholecalciferol  -Hold home losartan  -Hold home azathioprine     #Possible pyelonephritis  -IV ceftriaxone started  -Fu urine culture  -UA negative   -Trend WBC    #HTN  #HLD  -Continue home hydralazine 25 mg TID  -Continue home amlodipine 10 mg daily  -Hold home losartan     #Back pain  ::MRI 09/19/2023 showing nonspecific focus of abnormal signal in R pedicle of L4 c/w osseous hemangioma, stress fracture, or osteoid osteoma. No discitis or osteomyelitis. No evidence of metastasis.  -Tylenol 975 mg q8h   -Lidocaine patch    #TIIDM  -Insulin glargine 12 U daily  -Moderate SSI    F: PRN  E: PRN  N; Low potassium  Ppx: SQH    Full Code  NOK: Amalia Enriquez, significant other - [phone]    Guillermo Oneal MD  PGY-1  [place] [id number]

## 2023-11-16 NOTE — HOSPITAL COURSE
Manolo Bashir is a 68 y.o. male with a PMHx of ESRD s/p renal transplant (1992, 2013 (left kidney)), HTN, TIIDM, and prior HCV presenting with several days of anuria, dysuria, 9/10 sharp back pain radiating to abdomen, and LE swelling.     Pt otherwise denied fevers, chills, chest pain, SOB, diarrhea, constipation, hematuria, or nausea/vomiting.    ED Course:  -/85, 97 RA, 74 HR, T 36.8, 16 rr  -20 hydral then 25 hydralazine given for BP control  -POCUS showed no hydronephrosis and decompressed bladder  -CTA/P showed mild fat stranding adjacent to the transplant kidney in the setting of anasarca. Correlate clinical evidence of pyelonephritis.  -Pt started on IV ceftriaxone     Pt noted improved urine output in ED prior to lasix administration.    On admission, nephrology transplant team consulted for recommendations. Pt started on IV Lasix 80 for diuresis with output of 600 mL. Biopsy planned for pt on 11/20 with possibility of MGUS related ISIAH. Pt sent for skeletal survey as well for back pain, which ruled out lytic lesions.     Per nephrology, was able to restart azathioprine and home losartan. 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 Creatinine remained stable. Pt received continued IV diuresis per nephrology. Pt resumed on losartan. Creatinine slightly elevated the day of discharge but renal was ok discharging the patient with follow up . No changes made in immunosuppression. Carvedilol increased to 37.5mg BID, hydralazine increased to 50mg TID. Blood pressure better controlled. Pravastain renally dosed from 40mg daily to 10mg daily. Transplant nephrology recommending 20mg PO furosemide PRN upon discharge.  Pt to have labs drawn 11/27 and follow up with Renal Transplant in the next 2-3 weeks post-discharge. Pt discharged home in stable condition.

## 2023-11-16 NOTE — ED PROVIDER NOTES
HPI   Chief Complaint   Patient presents with   • Difficulty Urinating       Manolo Bashir is a 68 year old male with PMHx of ESRD s/p renal transplant (1992, 2013 (left kidney)), HTN, T2DM, hx of HCV who presents with difficulty urinating for the past 2 days and abdominal/lower extremity edema. Patient reports that since Sunday, he has not urinated and has noticed bilateral leg swelling and abdominal swelling. He also has had lower back pain, which has been ongoing since bone marrow biopsy conducted on 10/26/23 for evaluation of potential plasma cell disorder. Patient denies dysuria prior to anuria, denies abdominal pain, nausea, vomiting, flank tenderness, fever, chills, changes in bowel movements, chest pain, or shortness of breath.                          Gibbs Coma Scale Score: 15                  Patient History   Past Medical History:   Diagnosis Date   • Chronic kidney disease, stage 3 unspecified (CMS/HCC) 09/26/2018    Stage 3 chronic kidney disease   • COVID-19 06/18/2020    COVID-19 virus infection   • Diabetes (CMS/HCC)    • HTN (hypertension)    • Other long term (current) drug therapy 07/20/2021    High risk medication use   • Personal history of other diseases of the circulatory system     Personal history of cardiac murmur   • Personal history of other infectious and parasitic diseases 08/17/2015    History of hepatitis   • Polyp, colonic 08/17/2023   • Primary osteoarthritis of both ankles 08/17/2023   • Tubular adenoma of colon 08/17/2023   • Unspecified kidney failure 08/17/2016    Renal failure     Past Surgical History:   Procedure Laterality Date   • ILEOSTOMY  04/25/2017    Ileostomy   • ILEOSTOMY CLOSURE  08/17/2015    Ileostomy Closure   • OTHER SURGICAL HISTORY  04/21/2017    Right Hemicolectomy   • OTHER SURGICAL HISTORY  08/17/2015    Arteriovenous Surgery Creation Of A-V Fistula   • OTHER SURGICAL HISTORY  08/17/2015    Sigmoidoscopy (Fiberoptic, Therapeutic )   • PROSTATECTOMY   10/11/2013    Prostatectomy Radical   • TRANSPLANT, KIDNEY, OPEN  1992   • TRANSPLANT, KIDNEY, OPEN  2013   • US GUIDED PERCUTANEOUS PERITONEAL OR RETROPERITONEAL FLUID COLLECTION DRAINAGE  10/20/2022    US GUIDED PERCUTANEOUS PERITONEAL OR RETROPERITONEAL FLUID COLLECTION DRAINAGE 10/20/2022 [facility]     Family History   Problem Relation Name Age of Onset   • Bone cancer Mother     • Other (corona's sarcome of the bone marrow) Mother     • Prostate cancer Father     • Diabetes Other Family Hist    • Hypertension Other Family Hist      Social History     Tobacco Use   • Smoking status: Never   • Smokeless tobacco: Never   Vaping Use   • Vaping Use: Never used   Substance Use Topics   • Alcohol use: Yes   • Drug use: Yes     Types: Oxycodone       Physical Exam   ED Triage Vitals [11/15/23 1352]   Temp Heart Rate Resp BP   36.8 °C (98.2 °F) 74 16 174/85      SpO2 Temp Source Heart Rate Source Patient Position   97 % Temporal -- --      BP Location FiO2 (%)     -- --       Physical Exam  Constitutional:       Appearance: Normal appearance.   HENT:      Head: Normocephalic and atraumatic.   Eyes:      Extraocular Movements: Extraocular movements intact.      Conjunctiva/sclera: Conjunctivae normal.   Cardiovascular:      Rate and Rhythm: Normal rate and regular rhythm.   Pulmonary:      Effort: Pulmonary effort is normal.      Breath sounds: Normal breath sounds.   Abdominal:      Comments: Abdomen mildly distended but soft/compressible. Normoactive bowel sounds and nontender to palpation in all quadrants   Musculoskeletal:      Right lower leg: Edema present.      Left lower leg: Edema present.      Comments: 1+ edema bilaterally in lower extremities up to knees   Skin:     General: Skin is warm and dry.   Neurological:      Mental Status: He is alert.         ED Course & MDM   Diagnoses as of 01/09/24 1239   Pyelonephritis       Medical Decision Making  Given anuria and lower extremity swelling, kidney  injury 2/2 to obstruction, intrinsic causes, or pre-renal are possible. Renal or ureteral stone is also possible. Additionally, intra-abdominal pathology may be contributing to symptoms. Back pain may be caused by potential pathology fracture given concern for potential plasma cell dyscrasia and recent bone marrow biopsy. Labs include CBC, BMP, BNP, trop, UA, which was significant for Cr 3.42 (prior 2.8), K 5.5, proteinuria, 1+ casts, , trop wnl. Bedside POCUS demonstrated minimal bladder volume and low concern for hydronephrosis, making post-renal cause unlikely. EKG did not show peaked T waves. Urine electrolytes ordered for further characterization of ISIAH. CT A/P ordered for further characterization of symptoms/evaluation for intra-abdominal pathology, which showed anasarca, consistent with fluid overload status. Given ISIAH and volume status management needs, will contact transplant nephrology for possible admission.        Procedure  Procedures     Rolanda Coburn MD  Resident  11/15/23 2017       Rolanda Coburn MD  Resident  11/15/23 [id number]       Rolanda Coburn MD  Resident  11/16/23 [id number]

## 2023-11-17 ENCOUNTER — APPOINTMENT (OUTPATIENT)
Dept: CARDIOLOGY | Facility: HOSPITAL | Age: 68
End: 2023-11-17
Payer: COMMERCIAL

## 2023-11-17 DIAGNOSIS — E10.9 TYPE 1 DIABETES MELLITUS WITHOUT COMPLICATION (MULTI): Primary | ICD-10-CM

## 2023-11-17 LAB
ALBUMIN SERPL BCP-MCNC: 2.7 G/DL (ref 3.4–5)
ALBUMIN SERPL BCP-MCNC: 2.8 G/DL (ref 3.4–5)
ANION GAP SERPL CALC-SCNC: 10 MMOL/L (ref 10–20)
ANION GAP SERPL CALC-SCNC: 13 MMOL/L (ref 10–20)
BASOPHILS # BLD AUTO: 0.01 X10*3/UL (ref 0–0.1)
BASOPHILS # BLD AUTO: 0.01 X10*3/UL (ref 0–0.1)
BASOPHILS NFR BLD AUTO: 0.3 %
BASOPHILS NFR BLD AUTO: 0.3 %
BKV DNA SERPL NAA+PROBE-LOG#: NORMAL {LOG_COPIES}/ML
BUN SERPL-MCNC: 49 MG/DL (ref 6–23)
BUN SERPL-MCNC: 52 MG/DL (ref 6–23)
CALCIUM SERPL-MCNC: 8.2 MG/DL (ref 8.6–10.6)
CALCIUM SERPL-MCNC: 8.7 MG/DL (ref 8.6–10.6)
CHLORIDE SERPL-SCNC: 109 MMOL/L (ref 98–107)
CHLORIDE SERPL-SCNC: 113 MMOL/L (ref 98–107)
CO2 SERPL-SCNC: 21 MMOL/L (ref 21–32)
CO2 SERPL-SCNC: 22 MMOL/L (ref 21–32)
CREAT SERPL-MCNC: 3.3 MG/DL (ref 0.5–1.3)
CREAT SERPL-MCNC: 3.34 MG/DL (ref 0.5–1.3)
EBV DNA SPEC NAA+PROBE-LOG#: NORMAL {LOG_COPIES}/ML
EOSINOPHIL # BLD AUTO: 0.01 X10*3/UL (ref 0–0.7)
EOSINOPHIL # BLD AUTO: 0.09 X10*3/UL (ref 0–0.7)
EOSINOPHIL NFR BLD AUTO: 0.3 %
EOSINOPHIL NFR BLD AUTO: 2.3 %
ERYTHROCYTE [DISTWIDTH] IN BLOOD BY AUTOMATED COUNT: 16.6 % (ref 11.5–14.5)
ERYTHROCYTE [DISTWIDTH] IN BLOOD BY AUTOMATED COUNT: 16.6 % (ref 11.5–14.5)
GFR SERPL CREATININE-BSD FRML MDRD: 19 ML/MIN/1.73M*2
GFR SERPL CREATININE-BSD FRML MDRD: 20 ML/MIN/1.73M*2
GLUCOSE BLD MANUAL STRIP-MCNC: 182 MG/DL (ref 74–99)
GLUCOSE BLD MANUAL STRIP-MCNC: 197 MG/DL (ref 74–99)
GLUCOSE BLD MANUAL STRIP-MCNC: 254 MG/DL (ref 74–99)
GLUCOSE BLD MANUAL STRIP-MCNC: 317 MG/DL (ref 74–99)
GLUCOSE SERPL-MCNC: 210 MG/DL (ref 74–99)
GLUCOSE SERPL-MCNC: 308 MG/DL (ref 74–99)
HCT VFR BLD AUTO: 30.7 % (ref 41–52)
HCT VFR BLD AUTO: 31.3 % (ref 41–52)
HGB BLD-MCNC: 9.6 G/DL (ref 13.5–17.5)
HGB BLD-MCNC: 9.9 G/DL (ref 13.5–17.5)
IMM GRANULOCYTES # BLD AUTO: 0.02 X10*3/UL (ref 0–0.7)
IMM GRANULOCYTES # BLD AUTO: 0.02 X10*3/UL (ref 0–0.7)
IMM GRANULOCYTES NFR BLD AUTO: 0.5 % (ref 0–0.9)
IMM GRANULOCYTES NFR BLD AUTO: 0.5 % (ref 0–0.9)
KAPPA LC SERPL-MCNC: 10.75 MG/DL (ref 0.33–1.94)
KAPPA LC/LAMBDA SER: 1.57 {RATIO} (ref 0.26–1.65)
LABORATORY COMMENT REPORT: NOT DETECTED
LABORATORY COMMENT REPORT: NOT DETECTED
LAMBDA LC SERPL-MCNC: 6.84 MG/DL (ref 0.57–2.63)
LYMPHOCYTES # BLD AUTO: 0.43 X10*3/UL (ref 1.2–4.8)
LYMPHOCYTES # BLD AUTO: 0.93 X10*3/UL (ref 1.2–4.8)
LYMPHOCYTES NFR BLD AUTO: 11.2 %
LYMPHOCYTES NFR BLD AUTO: 23.5 %
MAGNESIUM SERPL-MCNC: 1.86 MG/DL (ref 1.6–2.4)
MCH RBC QN AUTO: 28.3 PG (ref 26–34)
MCH RBC QN AUTO: 28.9 PG (ref 26–34)
MCHC RBC AUTO-ENTMCNC: 30.7 G/DL (ref 32–36)
MCHC RBC AUTO-ENTMCNC: 32.2 G/DL (ref 32–36)
MCV RBC AUTO: 90 FL (ref 80–100)
MCV RBC AUTO: 92 FL (ref 80–100)
MONOCYTES # BLD AUTO: 0.17 X10*3/UL (ref 0.1–1)
MONOCYTES # BLD AUTO: 0.33 X10*3/UL (ref 0.1–1)
MONOCYTES NFR BLD AUTO: 4.4 %
MONOCYTES NFR BLD AUTO: 8.4 %
NEUTROPHILS # BLD AUTO: 2.57 X10*3/UL (ref 1.2–7.7)
NEUTROPHILS # BLD AUTO: 3.2 X10*3/UL (ref 1.2–7.7)
NEUTROPHILS NFR BLD AUTO: 65 %
NEUTROPHILS NFR BLD AUTO: 83.3 %
NRBC BLD-RTO: 0 /100 WBCS (ref 0–0)
NRBC BLD-RTO: 0 /100 WBCS (ref 0–0)
PHOSPHATE SERPL-MCNC: 2.9 MG/DL (ref 2.5–4.9)
PHOSPHATE SERPL-MCNC: 3.2 MG/DL (ref 2.5–4.9)
PLATELET # BLD AUTO: 102 X10*3/UL (ref 150–450)
PLATELET # BLD AUTO: 86 X10*3/UL (ref 150–450)
POTASSIUM SERPL-SCNC: 4.3 MMOL/L (ref 3.5–5.3)
POTASSIUM SERPL-SCNC: 4.8 MMOL/L (ref 3.5–5.3)
RBC # BLD AUTO: 3.39 X10*6/UL (ref 4.5–5.9)
RBC # BLD AUTO: 3.43 X10*6/UL (ref 4.5–5.9)
SODIUM SERPL-SCNC: 138 MMOL/L (ref 136–145)
SODIUM SERPL-SCNC: 141 MMOL/L (ref 136–145)
TACROLIMUS BLD-MCNC: 19.2 NG/ML
WBC # BLD AUTO: 3.8 X10*3/UL (ref 4.4–11.3)
WBC # BLD AUTO: 4 X10*3/UL (ref 4.4–11.3)

## 2023-11-17 PROCEDURE — 99223 1ST HOSP IP/OBS HIGH 75: CPT | Performed by: INTERNAL MEDICINE

## 2023-11-17 PROCEDURE — 82947 ASSAY GLUCOSE BLOOD QUANT: CPT

## 2023-11-17 PROCEDURE — 2500000001 HC RX 250 WO HCPCS SELF ADMINISTERED DRUGS (ALT 637 FOR MEDICARE OP)

## 2023-11-17 PROCEDURE — 83521 IG LIGHT CHAINS FREE EACH: CPT

## 2023-11-17 PROCEDURE — 2500000004 HC RX 250 GENERAL PHARMACY W/ HCPCS (ALT 636 FOR OP/ED)

## 2023-11-17 PROCEDURE — 80197 ASSAY OF TACROLIMUS: CPT

## 2023-11-17 PROCEDURE — 2500000002 HC RX 250 W HCPCS SELF ADMINISTERED DRUGS (ALT 637 FOR MEDICARE OP, ALT 636 FOR OP/ED)

## 2023-11-17 PROCEDURE — 83735 ASSAY OF MAGNESIUM: CPT

## 2023-11-17 PROCEDURE — 36415 COLL VENOUS BLD VENIPUNCTURE: CPT

## 2023-11-17 PROCEDURE — 80069 RENAL FUNCTION PANEL: CPT

## 2023-11-17 PROCEDURE — 1100000001 HC PRIVATE ROOM DAILY

## 2023-11-17 PROCEDURE — 2500000005 HC RX 250 GENERAL PHARMACY W/O HCPCS

## 2023-11-17 PROCEDURE — 85025 COMPLETE CBC W/AUTO DIFF WBC: CPT

## 2023-11-17 PROCEDURE — 97161 PT EVAL LOW COMPLEX 20 MIN: CPT | Mod: GP

## 2023-11-17 PROCEDURE — 99233 SBSQ HOSP IP/OBS HIGH 50: CPT | Performed by: HOSPITALIST

## 2023-11-17 PROCEDURE — 84100 ASSAY OF PHOSPHORUS: CPT

## 2023-11-17 PROCEDURE — 99233 SBSQ HOSP IP/OBS HIGH 50: CPT

## 2023-11-17 RX ORDER — ACETAMINOPHEN 500 MG
5 TABLET ORAL NIGHTLY
Status: DISCONTINUED | OUTPATIENT
Start: 2023-11-17 | End: 2023-11-22 | Stop reason: HOSPADM

## 2023-11-17 RX ORDER — METHOCARBAMOL 500 MG/1
500 TABLET, FILM COATED ORAL EVERY 8 HOURS SCHEDULED
Status: DISCONTINUED | OUTPATIENT
Start: 2023-11-17 | End: 2023-11-22 | Stop reason: HOSPADM

## 2023-11-17 RX ORDER — DICLOFENAC SODIUM 10 MG/G
4 GEL TOPICAL 4 TIMES DAILY PRN
Status: DISCONTINUED | OUTPATIENT
Start: 2023-11-17 | End: 2023-11-22 | Stop reason: HOSPADM

## 2023-11-17 RX ORDER — FUROSEMIDE 10 MG/ML
40 INJECTION INTRAMUSCULAR; INTRAVENOUS ONCE
Status: COMPLETED | OUTPATIENT
Start: 2023-11-17 | End: 2023-11-17

## 2023-11-17 RX ORDER — TRAMADOL HYDROCHLORIDE 50 MG/1
25 TABLET ORAL ONCE
Status: COMPLETED | OUTPATIENT
Start: 2023-11-17 | End: 2023-11-17

## 2023-11-17 RX ORDER — CEFTRIAXONE 1 G/50ML
1 INJECTION, SOLUTION INTRAVENOUS EVERY 24 HOURS
Status: DISCONTINUED | OUTPATIENT
Start: 2023-11-17 | End: 2023-11-18

## 2023-11-17 RX ADMIN — Medication 5 MG: at 21:22

## 2023-11-17 RX ADMIN — SODIUM ZIRCONIUM CYCLOSILICATE 10 G: 10 POWDER, FOR SUSPENSION ORAL at 09:31

## 2023-11-17 RX ADMIN — ACETAMINOPHEN 975 MG: 325 TABLET ORAL at 06:16

## 2023-11-17 RX ADMIN — LORATADINE 10 MG: 10 TABLET ORAL at 09:28

## 2023-11-17 RX ADMIN — FERROUS SULFATE TAB 325 MG (65 MG ELEMENTAL FE) 65 MG OF IRON: 325 (65 FE) TAB at 09:29

## 2023-11-17 RX ADMIN — HYDRALAZINE HYDROCHLORIDE 25 MG: 25 TABLET, FILM COATED ORAL at 14:51

## 2023-11-17 RX ADMIN — TRAMADOL HYDROCHLORIDE 25 MG: 50 TABLET, COATED ORAL at 10:39

## 2023-11-17 RX ADMIN — CARVEDILOL 37.5 MG: 25 TABLET, FILM COATED ORAL at 09:29

## 2023-11-17 RX ADMIN — INSULIN LISPRO 2 UNITS: 100 INJECTION, SOLUTION INTRAVENOUS; SUBCUTANEOUS at 13:06

## 2023-11-17 RX ADMIN — INSULIN GLARGINE 12 UNITS: 100 INJECTION, SOLUTION SUBCUTANEOUS at 09:45

## 2023-11-17 RX ADMIN — CARVEDILOL 37.5 MG: 25 TABLET, FILM COATED ORAL at 21:22

## 2023-11-17 RX ADMIN — PREDNISONE 10 MG: 5 TABLET ORAL at 09:28

## 2023-11-17 RX ADMIN — ACETAMINOPHEN 975 MG: 325 TABLET ORAL at 23:50

## 2023-11-17 RX ADMIN — PRAVASTATIN SODIUM 40 MG: 20 TABLET ORAL at 21:22

## 2023-11-17 RX ADMIN — ACETAMINOPHEN 975 MG: 325 TABLET ORAL at 14:51

## 2023-11-17 RX ADMIN — HYDRALAZINE HYDROCHLORIDE 25 MG: 25 TABLET, FILM COATED ORAL at 21:22

## 2023-11-17 RX ADMIN — SODIUM ZIRCONIUM CYCLOSILICATE 10 G: 10 POWDER, FOR SUSPENSION ORAL at 14:51

## 2023-11-17 RX ADMIN — LIDOCAINE 1 PATCH: 4 PATCH TOPICAL at 09:29

## 2023-11-17 RX ADMIN — METHOCARBAMOL TABLETS 500 MG: 500 TABLET, COATED ORAL at 21:22

## 2023-11-17 RX ADMIN — METHOCARBAMOL TABLETS 500 MG: 500 TABLET, COATED ORAL at 14:51

## 2023-11-17 RX ADMIN — Medication 2000 UNITS: at 09:29

## 2023-11-17 RX ADMIN — INSULIN LISPRO 2 UNITS: 100 INJECTION, SOLUTION INTRAVENOUS; SUBCUTANEOUS at 09:45

## 2023-11-17 RX ADMIN — CINACALCET 30 MG: 30 TABLET ORAL at 09:29

## 2023-11-17 RX ADMIN — FUROSEMIDE 40 MG: 10 INJECTION, SOLUTION INTRAMUSCULAR; INTRAVENOUS at 10:38

## 2023-11-17 RX ADMIN — TACROLIMUS 2.5 MG: 0.5 CAPSULE ORAL at 18:09

## 2023-11-17 RX ADMIN — HYDRALAZINE HYDROCHLORIDE 25 MG: 25 TABLET, FILM COATED ORAL at 09:29

## 2023-11-17 RX ADMIN — FERROUS SULFATE TAB 325 MG (65 MG ELEMENTAL FE) 65 MG OF IRON: 325 (65 FE) TAB at 21:22

## 2023-11-17 RX ADMIN — CEFTRIAXONE SODIUM 1 G: 1 INJECTION, SOLUTION INTRAVENOUS at 12:44

## 2023-11-17 RX ADMIN — TACROLIMUS 2.5 MG: 0.5 CAPSULE ORAL at 06:15

## 2023-11-17 RX ADMIN — INSULIN LISPRO 6 UNITS: 100 INJECTION, SOLUTION INTRAVENOUS; SUBCUTANEOUS at 18:10

## 2023-11-17 RX ADMIN — AMLODIPINE BESYLATE 10 MG: 10 TABLET ORAL at 09:29

## 2023-11-17 SDOH — SOCIAL STABILITY: SOCIAL INSECURITY: ARE THERE ANY APPARENT SIGNS OF INJURIES/BEHAVIORS THAT COULD BE RELATED TO ABUSE/NEGLECT?: NO

## 2023-11-17 SDOH — SOCIAL STABILITY: SOCIAL INSECURITY: WERE YOU ABLE TO COMPLETE ALL THE BEHAVIORAL HEALTH SCREENINGS?: YES

## 2023-11-17 SDOH — SOCIAL STABILITY: SOCIAL INSECURITY: ARE YOU OR HAVE YOU BEEN THREATENED OR ABUSED PHYSICALLY, EMOTIONALLY, OR SEXUALLY BY ANYONE?: NO

## 2023-11-17 SDOH — SOCIAL STABILITY: SOCIAL INSECURITY: DOES ANYONE TRY TO KEEP YOU FROM HAVING/CONTACTING OTHER FRIENDS OR DOING THINGS OUTSIDE YOUR HOME?: NO

## 2023-11-17 SDOH — SOCIAL STABILITY: SOCIAL INSECURITY: HAS ANYONE EVER THREATENED TO HURT YOUR FAMILY OR YOUR PETS?: NO

## 2023-11-17 SDOH — SOCIAL STABILITY: SOCIAL INSECURITY: DO YOU FEEL UNSAFE GOING BACK TO THE PLACE WHERE YOU ARE LIVING?: NO

## 2023-11-17 SDOH — SOCIAL STABILITY: SOCIAL INSECURITY: HAVE YOU HAD THOUGHTS OF HARMING ANYONE ELSE?: NO

## 2023-11-17 SDOH — SOCIAL STABILITY: SOCIAL INSECURITY: DO YOU FEEL ANYONE HAS EXPLOITED OR TAKEN ADVANTAGE OF YOU FINANCIALLY OR OF YOUR PERSONAL PROPERTY?: NO

## 2023-11-17 SDOH — SOCIAL STABILITY: SOCIAL INSECURITY: ABUSE: ADULT

## 2023-11-17 ASSESSMENT — PAIN DESCRIPTION - LOCATION: LOCATION: BACK

## 2023-11-17 ASSESSMENT — COGNITIVE AND FUNCTIONAL STATUS - GENERAL
MOBILITY SCORE: 24
PATIENT BASELINE BEDBOUND: NO
MOBILITY SCORE: 24
DAILY ACTIVITIY SCORE: 24

## 2023-11-17 ASSESSMENT — LIFESTYLE VARIABLES
SKIP TO QUESTIONS 9-10: 1
HOW OFTEN DO YOU HAVE 6 OR MORE DRINKS ON ONE OCCASION: NEVER
AUDIT-C TOTAL SCORE: 0
HOW MANY STANDARD DRINKS CONTAINING ALCOHOL DO YOU HAVE ON A TYPICAL DAY: PATIENT DOES NOT DRINK
AUDIT-C TOTAL SCORE: 0
HOW OFTEN DO YOU HAVE A DRINK CONTAINING ALCOHOL: NEVER

## 2023-11-17 ASSESSMENT — ACTIVITIES OF DAILY LIVING (ADL)
HEARING - LEFT EAR: FUNCTIONAL
ADEQUATE_TO_COMPLETE_ADL: YES
FEEDING YOURSELF: INDEPENDENT
BATHING: INDEPENDENT
HEARING - RIGHT EAR: FUNCTIONAL
WALKS IN HOME: INDEPENDENT
TOILETING: INDEPENDENT
DRESSING YOURSELF: INDEPENDENT
JUDGMENT_ADEQUATE_SAFELY_COMPLETE_DAILY_ACTIVITIES: YES
GROOMING: INDEPENDENT
PATIENT'S MEMORY ADEQUATE TO SAFELY COMPLETE DAILY ACTIVITIES?: YES

## 2023-11-17 ASSESSMENT — PAIN SCALES - GENERAL
PAINLEVEL_OUTOF10: 6
PAINLEVEL_OUTOF10: 10 - WORST POSSIBLE PAIN

## 2023-11-17 ASSESSMENT — PAIN - FUNCTIONAL ASSESSMENT
PAIN_FUNCTIONAL_ASSESSMENT: 0-10
PAIN_FUNCTIONAL_ASSESSMENT: 0-10

## 2023-11-17 ASSESSMENT — PATIENT HEALTH QUESTIONNAIRE - PHQ9
1. LITTLE INTEREST OR PLEASURE IN DOING THINGS: NOT AT ALL
SUM OF ALL RESPONSES TO PHQ9 QUESTIONS 1 & 2: 0
2. FEELING DOWN, DEPRESSED OR HOPELESS: NOT AT ALL

## 2023-11-17 NOTE — PROGRESS NOTES
Transplant Nephrology progress note     Date of admission: 11/15/2023     Manolo Bashir is a 68 y.o.  with [facility]   Past Medical History:   Diagnosis Date    Chronic kidney disease, stage 3 unspecified (CMS/HCC) 09/26/2018    Stage 3 chronic kidney disease    COVID-19 06/18/2020    COVID-19 virus infection    Diabetes (CMS/[facility])     HTN (hypertension)     Other long term (current) drug therapy 07/20/2021    High risk medication use    Personal history of other diseases of the circulatory system     Personal history of cardiac murmur    Personal history of other infectious and parasitic diseases 08/17/2015    History of hepatitis    Polyp, colonic 08/17/2023    Primary osteoarthritis of both ankles 08/17/2023    Tubular adenoma of colon 08/17/2023    Unspecified kidney failure 08/17/2016    Renal failure        SUBJECTIVE:    Complain of pain in the back area.  Urine output in last 24 hours documented is only 200 cc need close monitoring of I's and O's.      PROBLEM LIST:  Principal Problem:    Pyelonephritis         ALLERGIES:  No Known Allergies         CURRENT MEDICATIONS:  Scheduled medications  acetaminophen, 975 mg, oral, q8h  amLODIPine, 10 mg, oral, Daily  [Held by provider] azaTHIOprine, 25 mg, oral, Daily  carvedilol, 37.5 mg, oral, BID  cefTRIAXone, 1 g, intravenous, q24h  cholecalciferol, 2,000 Units, oral, Daily  cinacalcet, 30 mg, oral, Daily  ferrous sulfate (325 mg ferrous sulfate), 65 mg of iron, oral, BID  hydrALAZINE, 25 mg, oral, TID  insulin glargine, 12 Units, subcutaneous, Daily  insulin lispro, 0-10 Units, subcutaneous, TID with meals  lidocaine, 1 patch, transdermal, Daily  loratadine, 10 mg, oral, Daily  [Held by provider] losartan, 25 mg, oral, Daily  pravastatin, 40 mg, oral, Nightly  predniSONE, 10 mg, oral, q AM  sodium zirconium cyclosilicate, 10 g, oral, TID  tacrolimus, 2.5 mg, oral, q12h ZOË      Continuous medications     PRN medications  PRN medications: dextrose 10 % in water  "(D10W), dextrose, docusate sodium, glucagon, polyethylene glycol       OBJECTIVE:    VITALS: Visit Vitals  BP (!) 178/96   Pulse 73   Temp 37.4 °C (99.3 °F)   Resp 18   Ht 1.727 m (5' 8\")   Wt 75.3 kg (166 lb)   SpO2 95%   BMI 25.24 kg/m²   Smoking Status Never   BSA 1.9 m²        General: No distress   Mucosa moist   AI, AC, AF     HEENT: PEERLA  CVS: S1 S2 no murmurs  RESP:  Lungs clear to auscultation   ABDO: Soft, non-tender   Neuro: A + O x 3  Skin: No rash   Extremities: No edema       LABS:  Results from last 72 hours   Lab Units 11/17/23  0907   WBC AUTO x10*3/uL 4.0*   HEMOGLOBIN g/dL 9.9*   MCV fL 90   PLATELETS AUTO x10*3/uL 102*   BUN mg/dL 49*   CREATININE mg/dL 3.34*   CALCIUM mg/dL 8.7            Intake/Output Summary (Last 24 hours) at 11/17/2023 1219  Last data filed at 11/16/2023 1729  Gross per 24 hour   Intake --   Output 200 ml   Net -200 ml          ASSESSMENT AND PLAN:  Manolo Bashir is a 68 y.o. male with PMHX of ESRD s/p 2 renal transplants (1992 followed by 2013), CKD 3, DM2, PRCA s/p rdical prostatectomy and HCV presented to the ED on 11/15 with chief complaint of difficulty urinating for the last few days along with increased swelling in the lower extremities .  Transplant nephrology consulted for further management of kidney transplant.    Allograft function: Acute kidney injury on chronic kidney disease:  -His a baseline creatinine is in the range of 2.8-2.5 recently.  Urine analysis is showing +3 protein and no signs of infection. CAT scan showing mild stranding does not look like a urinary tract infection but follow-up with the cultures before discontinuing antibiotics.  Urine lites corresponding to intrinsic renal disease.  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 The former two bands were last detected on 10/18/22 at 0.1 g/dL " and 0.1 g/dL  -Mild uptrend in the creatinine noticed.  Will consider continuing Lasix 40 mg IV daily.  -We will consider hemeoncology input regarding further management of MGUS versus multiple myeloma.  -EBV, BK and CMV are pending at this time.  We will follow-up with the DSA.  -Consider changing Lokelma to 5 g daily for now and if potassium is still running low then stop it.     Immunosuppression: Tacrolimus levels 19.2 seems to be posT medication use.  Please continue with 2.5 twice daily and recheck levels tomorrow morning before the morning dose.  Continue with prednisone 10 mg daily.  Hold azathioprine in the setting of multiple myeloma/MGUS    Anemia/leukopenia: Currently on iron supplementation continue for now.    Bone mineral disease: Calcium levels currently optimal if still downtrending consider stopping Cinacalcet.    Hemodynamics: Blood pressures currently are elevated likely in the setting of pain.  Need further adjustment of pain medications.  Continue with amlodipine, carvedilol and hydralazine and uptitrate hydralazine as needed.      Thank you for consulting .  Dina Benoit MD

## 2023-11-17 NOTE — CONSULTS
Name: Manolo Bashir  MRN: [id number]  Admit Date: 11/15/2023  Encounter Date: 11/17/2023  PCP: No Assigned PCP Generic Provider, MD    Reason for consult: Renal failure 2/2 MGUS  Attending provider: Claudine Ojeda MD      Hematology Consult Note      History of Present Illness   Manolo Bashir is a 68 y.o. male with PMHX of ESRD s/p 2 renal transplants (1992 followed by 2013), CKD 3, DM2, PRCA s/p radical prostatectomy, HCV (treated), and MGUS who presented to the ED on 11/15 with chief complaint of difficulty urinating for the last few days along with increased swelling in the lower extremities. . 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 Differential diagnosis includes pyelonephritis vs. Plasma cell disease leading to renal insult. Hematology was consulted given the patient's history of MGUS, and concern that his renal failure is d/t possible myeloma.     The concern of pyelonephritis is presently low, with lack of significant WBC increase (stably at ~4), negative UA, and lack of urinary symptoms. CAT scan showing mild stranding and does not look like a urinary tract infection.  Urine lites corresponding to intrinsic renal disease. Cr is mildly increased (~3) from baseline 2.8-2.5 recently. Is documented to be oliguric with urine output of 200 ml in 24 hours. Patient presently reporting oliguria.     Hematologic history:     Seen by Dr. Alejandro on 10/17/2023 for evaluation of pancytopenia. He had worsening anemia (10-11 down to 7.4 g/dL) and platelets (200s down to 69K) over last 2-3 months.  He has had chronic lymphopenia but has not been neutropenic. Prior work up has included Epo 30, normal folate, borderline B12.  Has possible history of iron deficiency, but has been on supplement and unlikely to entirely explain abnormal counts. Thus recommended to have Have recommended bone marrow evaluation     He then presented to  ED on 10/19 with worsening diffuse generalized abdominal pain, found on workup to have pancytopenia with Plt of 15. Repeat CBC showed Plt of 92, counts that are much more consistent with patient's recent baseline over the past few months. Therefore the extreme thrombocytopenia seen on original CBC could have been a lab error. Smear from 10/19 shows normal WBC morphology, few fragment cells on RBC, no plt clumping. Coag studies indicate only slightly increased INR and PT, and d-dimer non VTE slightly elevated; not suggestive of coagulation abnormalities.  SPEP/free kappa lambda were ordered to workup possible plasma cell dyscrasia showing:    Monoclonal IgA lambda in the beta region at 0.2 g/dL, monoclonal IgG lambda in the gamma region at 0.1 g/dL, and monoclonal IgG kappa in the gamma region at 0.1 g/dL.    Outpt BMB obtained on 10/26 with patient being seen by Dr. Quiñonez on 11/07. BM demonstrated potential plasma cell disorder in setting of known MGUS (HYPERCELLULAR BONE MARROW (50%) WITH MATURING TRILINEAGE HEMATOPOIESIS AND INVOLVED (5%) BY PLASMA CELL NEOPLASM). FISH NEGATIVE for gain of 1q, hyperdiploidy of 3,7 and 11, rearrangement of IGH, and deletion of TP53. Noted that his counts have improved somewhat (Plt to 126), would recommend completion of myeloma evaluation.  Whether he meets criteria for multiple myeloma may be somewhat difficult to ascertain given concurrent kidney issues, but planed for PET scan, 24 hr urine, and beta-2 microglobulin in an outpatient setting.       Heme History   As above     Oncology History    No history exists.       Past Medical History     Past Medical History:   Diagnosis Date    Chronic kidney disease, stage 3 unspecified (CMS/HCC) 09/26/2018    Stage 3 chronic kidney disease    COVID-19 06/18/2020    COVID-19 virus infection    Diabetes (CMS/HCC)     HTN (hypertension)     Other long term (current) drug therapy 07/20/2021    High risk medication use    Personal history  of other diseases of the circulatory system     Personal history of cardiac murmur    Personal history of other infectious and parasitic diseases 08/17/2015    History of hepatitis    Polyp, colonic 08/17/2023    Primary osteoarthritis of both ankles 08/17/2023    Tubular adenoma of colon 08/17/2023    Unspecified kidney failure 08/17/2016    Renal failure         Past Surgical History     Past Surgical History:   Procedure Laterality Date    ILEOSTOMY  04/25/2017    Ileostomy    ILEOSTOMY CLOSURE  08/17/2015    Ileostomy Closure    OTHER SURGICAL HISTORY  04/21/2017    Right Hemicolectomy    OTHER SURGICAL HISTORY  08/17/2015    Arteriovenous Surgery Creation Of A-V Fistula    OTHER SURGICAL HISTORY  08/17/2015    Sigmoidoscopy (Fiberoptic, Therapeutic )    PROSTATECTOMY  10/11/2013    Prostatectomy Radical    TRANSPLANT, KIDNEY, OPEN  1992    TRANSPLANT, KIDNEY, OPEN  2013    US GUIDED PERCUTANEOUS PERITONEAL OR RETROPERITONEAL FLUID COLLECTION DRAINAGE  10/20/2022    US GUIDED PERCUTANEOUS PERITONEAL OR RETROPERITONEAL FLUID COLLECTION DRAINAGE 10/20/2022 [facility]         Family History      Family History   Problem Relation Name Age of Onset    Bone cancer Mother      Other (corona's sarcome of the bone marrow) Mother      Prostate cancer Father      Diabetes Other Family Hist     Hypertension Other Family Hist          Social History     Social History     Socioeconomic History    Marital status: Single     Spouse name: Not on file    Number of children: Not on file    Years of education: Not on file    Highest education level: Not on file   Occupational History    Not on file   Tobacco Use    Smoking status: Never    Smokeless tobacco: Never   Vaping Use    Vaping Use: Never used   Substance and Sexual Activity    Alcohol use: Yes    Drug use: Yes     Types: Oxycodone    Sexual activity: Not on file   Other Topics Concern    Not on file   Social History Narrative    Not on file     Social  Determinants of Health     Financial Resource Strain: Low Risk  (10/20/2023)    Overall Financial Resource Strain (CARDIA)     Difficulty of Paying Living Expenses: Not hard at all   Food Insecurity: No Food Insecurity (10/3/2023)    Hunger Vital Sign     Worried About Running Out of Food in the Last Year: Never true     Ran Out of Food in the Last Year: Never true   Transportation Needs: No Transportation Needs (10/20/2023)    PRAPARE - Transportation     Lack of Transportation (Medical): No     Lack of Transportation (Non-Medical): No   Physical Activity: Unknown (10/3/2023)    Exercise Vital Sign     Days of Exercise per Week: 2 days     Minutes of Exercise per Session: Patient refused   Recent Concern: Physical Activity - Insufficiently Active (10/3/2023)    Exercise Vital Sign     Days of Exercise per Week: 2 days     Minutes of Exercise per Session: 30 min   Stress: No Stress Concern Present (10/3/2023)    [employer] - Occupational Stress Questionnaire     Feeling of Stress : Not at all   Social Connections: Unknown (10/3/2023)    Social Connection and Isolation Panel [NHANES]     Frequency of Communication with Friends and Family: More than three times a week     Frequency of Social Gatherings with Friends and Family: Twice a week     Attends Worship Services: Patient refused     Active Member of Clubs or Organizations: Patient refused     Attends Club or Organization Meetings: Patient refused     Marital Status: Never    Intimate Partner Violence: Not At Risk (10/3/2023)    Humiliation, Afraid, Rape, and Kick questionnaire     Fear of Current or Ex-Partner: No     Emotionally Abused: No     Physically Abused: No     Sexually Abused: No   Housing Stability: Low Risk  (10/20/2023)    Housing Stability Vital Sign     Unable to Pay for Housing in the Last Year: No     Number of Places Lived in the Last Year: 1     Unstable Housing in the Last Year: No         Allergies   No  "Known Allergies    Medications   acetaminophen, 975 mg, q8h  amLODIPine, 10 mg, Daily  [Held by provider] azaTHIOprine, 25 mg, Daily  carvedilol, 37.5 mg, BID  cefTRIAXone, 1 g, q24h  cholecalciferol, 2,000 Units, Daily  cinacalcet, 30 mg, Daily  ferrous sulfate (325 mg ferrous sulfate), 65 mg of iron, BID  hydrALAZINE, 25 mg, TID  insulin glargine, 12 Units, Daily  insulin lispro, 0-10 Units, TID with meals  lidocaine, 1 patch, Daily  loratadine, 10 mg, Daily  [Held by provider] losartan, 25 mg, Daily  melatonin, 5 mg, Nightly  methocarbamol, 500 mg, q8h ZOË  pravastatin, 40 mg, Nightly  predniSONE, 10 mg, q AM  sodium zirconium cyclosilicate, 10 g, TID  tacrolimus, 2.5 mg, q12h ZOË         dextrose 10 % in water (D10W), 0.3 g/kg/hr, Once PRN  dextrose, 25 g, q15 min PRN  docusate sodium, 100 mg, BID PRN  glucagon, 1 mg, q15 min PRN  polyethylene glycol, 17 g, Daily PRN        Review of Systems   12-point ROS negative, except as specified in the HPI.    Physical Exam   /81   Pulse 71   Temp 36.5 °C (97.7 °F)   Resp 18   Ht 1.727 m (5' 8\")   Wt 75.3 kg (166 lb)   SpO2 99%   BMI 25.24 kg/m²   Weight:   Vitals:    11/15/23 1352   Weight: 75.3 kg (166 lb)       BSA: 1.9 meters squared    General: awake, alert, no acute distress  HEENT: normocephalic, atraumatic  Neck: no palpable lymphadenopathy  CV: normal rate, regular rhythm. +murmur   Resp: CTAB, no labored breathing  Abdominal: soft, non-tender, non-distended, active bowel sounds  Extremities: full ROM  Neuro: no focal neuro deficits  Skin: no rashes, erythema, or ecchymoses  Psych: normal affect and mood, appropriate judgment    Labs   Reviewed  Diagnostic Results     No lab exists for component: \"CBC\", \"CMP\", \"MAG\"   Results for orders placed or performed during the hospital encounter of 11/15/23 (from the past 96 hour(s))   Comprehensive metabolic panel   Result Value Ref Range    Glucose 220 (H) 74 - 99 mg/dL    Sodium 144 136 - 145 mmol/L    " Potassium 5.5 (H) 3.5 - 5.3 mmol/L    Chloride 114 (H) 98 - 107 mmol/L    Bicarbonate 24 21 - 32 mmol/L    Anion Gap 12 10 - 20 mmol/L    Urea Nitrogen 43 (H) 6 - 23 mg/dL    Creatinine 3.42 (H) 0.50 - 1.30 mg/dL    eGFR 19 (L) >60 mL/min/1.73m*2    Calcium 8.8 8.6 - 10.6 mg/dL    Albumin 2.9 (L) 3.4 - 5.0 g/dL    Alkaline Phosphatase 74 33 - 136 U/L    Total Protein 5.5 (L) 6.4 - 8.2 g/dL    AST 14 9 - 39 U/L    Bilirubin, Total 0.4 0.0 - 1.2 mg/dL    ALT 24 10 - 52 U/L   CBC and Auto Differential   Result Value Ref Range    WBC 3.9 (L) 4.4 - 11.3 x10*3/uL    nRBC 0.0 0.0 - 0.0 /100 WBCs    RBC 3.22 (L) 4.50 - 5.90 x10*6/uL    Hemoglobin 9.4 (L) 13.5 - 17.5 g/dL    Hematocrit 28.9 (L) 41.0 - 52.0 %    MCV 90 80 - 100 fL    MCH 29.2 26.0 - 34.0 pg    MCHC 32.5 32.0 - 36.0 g/dL    RDW 16.7 (H) 11.5 - 14.5 %    Platelets 121 (L) 150 - 450 x10*3/uL    Neutrophils % 81.9 40.0 - 80.0 %    Immature Granulocytes %, Automated 0.5 0.0 - 0.9 %    Lymphocytes % 12.4 13.0 - 44.0 %    Monocytes % 3.9 2.0 - 10.0 %    Eosinophils % 1.0 0.0 - 6.0 %    Basophils % 0.3 0.0 - 2.0 %    Neutrophils Absolute 3.16 1.20 - 7.70 x10*3/uL    Immature Granulocytes Absolute, Automated 0.02 0.00 - 0.70 x10*3/uL    Lymphocytes Absolute 0.48 (L) 1.20 - 4.80 x10*3/uL    Monocytes Absolute 0.15 0.10 - 1.00 x10*3/uL    Eosinophils Absolute 0.04 0.00 - 0.70 x10*3/uL    Basophils Absolute 0.01 0.00 - 0.10 x10*3/uL   Magnesium   Result Value Ref Range    Magnesium 1.91 1.60 - 2.40 mg/dL   Phosphorus   Result Value Ref Range    Phosphorus 2.8 2.5 - 4.9 mg/dL   B-type natriuretic peptide   Result Value Ref Range     (H) 0 - 99 pg/mL   Troponin I, High Sensitivity   Result Value Ref Range    Troponin I, High Sensitivity 38 0 - 53 ng/L   Urinalysis with Reflex Microscopic and Culture   Result Value Ref Range    Color, Urine Yellow Straw, Yellow    Appearance, Urine Hazy (N) Clear    Specific Gravity, Urine 1.024 1.005 - 1.035    pH, Urine 5.0 5.0,  5.5, 6.0, 6.5, 7.0, 7.5, 8.0    Protein, Urine >=500 (3+) (N) NEGATIVE mg/dL    Glucose, Urine 150 (2+) (A) NEGATIVE mg/dL    Blood, Urine SMALL (1+) (A) NEGATIVE    Ketones, Urine NEGATIVE NEGATIVE mg/dL    Bilirubin, Urine NEGATIVE NEGATIVE    Urobilinogen, Urine <2.0 <2.0 mg/dL    Nitrite, Urine NEGATIVE NEGATIVE    Leukocyte Esterase, Urine NEGATIVE NEGATIVE   Extra Urine Gray Tube   Result Value Ref Range    Extra Tube Hold for add-ons.    Urinalysis Microscopic   Result Value Ref Range    WBC, Urine 1-5 1-5, NONE /HPF    RBC, Urine 1-2 NONE, 1-2, 3-5 /HPF    Squamous Epithelial Cells, Urine 1-9 (SPARSE) Reference range not established. /HPF    Mucus, Urine 1+ Reference range not established. /LPF    Hyaline Casts, Urine 1+ (A) NONE /LPF   Urine electrolytes   Result Value Ref Range    Sodium, Urine Random 65 mmol/L    Sodium/Creatinine Ratio 56 Not established. mmol/g Creat    Potassium, Urine Random 52 mmol/L    Potassium/Creatinine Ratio 45 Not established mmol/g Creat    Chloride, Urine Random 89 mmol/L    Chloride/Creatinine Ratio 77 23 - 275 mmol/g creat    Creatinine, Urine Random 115.5 20.0 - 370.0 mg/dL   POCT GLUCOSE   Result Value Ref Range    POCT Glucose 255 (H) 74 - 99 mg/dL   POCT GLUCOSE   Result Value Ref Range    POCT Glucose 200 (H) 74 - 99 mg/dL   Renal Function Panel   Result Value Ref Range    Glucose 139 (H) 74 - 99 mg/dL    Sodium 141 136 - 145 mmol/L    Potassium 5.5 (H) 3.5 - 5.3 mmol/L    Chloride 114 (H) 98 - 107 mmol/L    Bicarbonate 21 21 - 32 mmol/L    Anion Gap 12 10 - 20 mmol/L    Urea Nitrogen 49 (H) 6 - 23 mg/dL    Creatinine 3.16 (H) 0.50 - 1.30 mg/dL    eGFR 21 (L) >60 mL/min/1.73m*2    Calcium 8.9 8.6 - 10.6 mg/dL    Phosphorus 2.7 2.5 - 4.9 mg/dL    Albumin 3.0 (L) 3.4 - 5.0 g/dL   ECG 12 lead   Result Value Ref Range    Ventricular Rate 73 BPM    Atrial Rate 300 BPM    QRS Duration 138 ms    QT Interval 422 ms    QTC Calculation(Bazett) 464 ms    P Axis 67 degrees    R  Axis 69 degrees    T Axis 39 degrees    QRS Count 12 beats    Q Onset 216 ms    P Onset 135 ms    P Offset 200 ms    T Offset 427 ms    QTC Fredericia 450 ms   POCT GLUCOSE   Result Value Ref Range    POCT Glucose 128 (H) 74 - 99 mg/dL   Protein, Urine Random   Result Value Ref Range    Total Protein, Urine Random 951 (H) 5 - 25 mg/dL   Mildred-Bruce PCR, Quant,Plasma   Result Value Ref Range    EBV DNA Result Not Detected Not Detected    EBV PCR Plasma Log     BK Virus PCR, Quantitative   Result Value Ref Range    BK Virus PCR Log      BKV DNA Result Not Detected Not Detected   POCT GLUCOSE   Result Value Ref Range    POCT Glucose 197 (H) 74 - 99 mg/dL   CBC and Auto Differential   Result Value Ref Range    WBC 4.0 (L) 4.4 - 11.3 x10*3/uL    nRBC 0.0 0.0 - 0.0 /100 WBCs    RBC 3.43 (L) 4.50 - 5.90 x10*6/uL    Hemoglobin 9.9 (L) 13.5 - 17.5 g/dL    Hematocrit 30.7 (L) 41.0 - 52.0 %    MCV 90 80 - 100 fL    MCH 28.9 26.0 - 34.0 pg    MCHC 32.2 32.0 - 36.0 g/dL    RDW 16.6 (H) 11.5 - 14.5 %    Platelets 102 (L) 150 - 450 x10*3/uL    Neutrophils % 65.0 40.0 - 80.0 %    Immature Granulocytes %, Automated 0.5 0.0 - 0.9 %    Lymphocytes % 23.5 13.0 - 44.0 %    Monocytes % 8.4 2.0 - 10.0 %    Eosinophils % 2.3 0.0 - 6.0 %    Basophils % 0.3 0.0 - 2.0 %    Neutrophils Absolute 2.57 1.20 - 7.70 x10*3/uL    Immature Granulocytes Absolute, Automated 0.02 0.00 - 0.70 x10*3/uL    Lymphocytes Absolute 0.93 (L) 1.20 - 4.80 x10*3/uL    Monocytes Absolute 0.33 0.10 - 1.00 x10*3/uL    Eosinophils Absolute 0.09 0.00 - 0.70 x10*3/uL    Basophils Absolute 0.01 0.00 - 0.10 x10*3/uL   Renal Function Panel   Result Value Ref Range    Glucose 210 (H) 74 - 99 mg/dL    Sodium 141 136 - 145 mmol/L    Potassium 4.3 3.5 - 5.3 mmol/L    Chloride 113 (H) 98 - 107 mmol/L    Bicarbonate 22 21 - 32 mmol/L    Anion Gap 10 10 - 20 mmol/L    Urea Nitrogen 49 (H) 6 - 23 mg/dL    Creatinine 3.34 (H) 0.50 - 1.30 mg/dL    eGFR 19 (L) >60 mL/min/1.73m*2     Calcium 8.7 8.6 - 10.6 mg/dL    Phosphorus 3.2 2.5 - 4.9 mg/dL    Albumin 2.7 (L) 3.4 - 5.0 g/dL   Magnesium   Result Value Ref Range    Magnesium 1.86 1.60 - 2.40 mg/dL   Tacrolimus level   Result Value Ref Range    Tacrolimus  19.2 (H) <=15.0 ng/mL   Immunoglobulin free LT chains blood   Result Value Ref Range    Ig Kappa Free Light Chain 10.75 (H) 0.33 - 1.94 mg/dL    Ig Lambda Free Light Chain 6.84 (H) 0.57 - 2.63 mg/dL    Kappa/Lambda Ratio 1.57 0.26 - 1.65   POCT GLUCOSE   Result Value Ref Range    POCT Glucose 182 (H) 74 - 99 mg/dL   POCT GLUCOSE   Result Value Ref Range    POCT Glucose 254 (H) 74 - 99 mg/dL            Imaging   Reviewed    Assessment/Plan     Manolo Bashir is a 68 y.o. male with PMHX of ESRD s/p 2 renal transplants (1992 followed by 2013), CKD 3, DM2, PRCA s/p radical prostatectomy, HCV (treated), and MGUS who presented to the ED on 11/15 with chief complaint of difficulty urinating for the last few days along with increased swelling in the lower extremities. . 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 MGUS diagnosed on 10/20 with Monoclonal IgA lambda in the beta region at 0.2 g/dL, monoclonal IgG lambda in the gamma region at 0.1 g/dL, and monoclonal IgG kappa in the gamma region at 0.1 g/dL. Differential diagnosis includes pyelonephritis vs. Plasma cell disease leading to renal insult. Hematology was consulted given the patient's history of MGUS, and concern that his renal failure is d/t possible myeloma.     The concern of pyelonephritis is presently low, with lack of significant WBC increase (stably at ~4), negative UA, and lack of urinary symptoms. CAT scan showing mild stranding and does not look like a urinary tract infection.  Urine lites corresponding to intrinsic renal disease. Cr is mildly increased (~3) from baseline 2.8-2.5 recently. Is documented to be oliguric with urine output of 200 ml  in 24 hours. Patient presently reporting oliguria.     Concern for MGUS evolving into Multiple Myeloma contributing to renal failure is presently low. Very recent bone biopsy (10/26) is with 5% plasma cell neoplasm, strongly favoring MGUS versus MM. The patient has non-IgM MGUS, which carries a risk of malignant transformation of ~1% per annum. Thus, the short interval between the BM biopsy to present would not favor a malignant transformation of MM.    That being said, in the outpatient setting, further possible workup of MM was planned. It would not be unreasonable to obtain a bone scan /skeletal survey while inpatient. A PET scan, however, is reserved only for the outpatient setting in this case.     As a separate concern, MGUS could cause light chain deposition in the kidney contributing to renal disease. However, current Kappa of 10.75, Rsoelia of 6.84 and K/L of 1.57 indicates low light chain burden presently. Thus, other intrinsic causes of renal disease should be thoroughly investigated. EBV/BK negative. CMV pending.     Recommendations:  - Recommend skeletal survey (CT or MRI) to evaluate for lytic lesions; rest of MM workup can be deferred to the outpatient setting   - Consider kidney biopsy to evaluate for alternative etiologies (ie. Autoimmune, infectious) of intrinsic renal disease      Thank you for this consult, and we will continue to follow.   Patient was seen, examined, and discussed with Dr. Ott.     Vamsi Melton, MS4    11/17/2023    Page:  Hematology Consult Pager: [phone]

## 2023-11-17 NOTE — PROGRESS NOTES
Manolo Bashir is a 68 y.o. male on day 1 of admission presenting with Pyelonephritis.    Subjective   No acute events overnight. Pt continues to endorse low back and R sided back pain in mid thoracic region. Otherwise denying chest pain, SOB, headache, nausea/vomiting.    Pt continues to state he has very minimal urine output. No diarrhea or constipation.       Objective     Physical Exam  Vitals reviewed.   Constitutional:       General: He is not in acute distress.     Appearance: Normal appearance. He is normal weight.   HENT:      Head: Normocephalic and atraumatic.      Right Ear: Tympanic membrane normal.      Left Ear: Tympanic membrane normal.      Nose: Nose normal.      Mouth/Throat:      Mouth: Mucous membranes are moist.      Pharynx: Oropharynx is clear.   Eyes:      Extraocular Movements: Extraocular movements intact.      Conjunctiva/sclera: Conjunctivae normal.      Pupils: Pupils are equal, round, and reactive to light.   Cardiovascular:      Rate and Rhythm: Normal rate and regular rhythm.      Pulses: Normal pulses.      Heart sounds: Normal heart sounds. No murmur heard.     No friction rub. No gallop.   Pulmonary:      Effort: Pulmonary effort is normal. No respiratory distress.      Breath sounds: Normal breath sounds. No wheezing, rhonchi or rales.   Chest:      Chest wall: No tenderness.   Abdominal:      General: Abdomen is flat. Bowel sounds are normal. There is distension.      Palpations: Abdomen is soft. There is no mass.      Tenderness: There is no abdominal tenderness. There is no right CVA tenderness, left CVA tenderness, guarding or rebound.      Hernia: No hernia is present.      Comments: Distended, nontender, soft   Musculoskeletal:         General: Normal range of motion.      Right lower leg: Edema present.      Left lower leg: Edema present.      Comments: R sided mid thoracic tenderness   Skin:     General: Skin is warm and dry.      Capillary Refill: Capillary refill takes  "less than 2 seconds.   Neurological:      General: No focal deficit present.      Mental Status: He is alert and oriented to person, place, and time. Mental status is at baseline.   Psychiatric:         Mood and Affect: Mood normal.         Behavior: Behavior normal.         Thought Content: Thought content normal.         Judgment: Judgment normal.         Last Recorded Vitals  Blood pressure (!) 178/96, pulse 73, temperature 37.4 °C (99.3 °F), resp. rate 18, height 1.727 m (5' 8\"), weight 75.3 kg (166 lb), SpO2 95 %.  Intake/Output last 3 Shifts:  I/O last 3 completed shifts:  In: - (0 mL/kg)   Out: 200 (2.7 mL/kg) [Urine:200 (0.1 mL/kg/hr)]  Weight: 75.3 kg     Relevant Results              Assessment/Plan   Principal Problem:    Pyelonephritis    Manolo Bashir is a 68 y.o. male with a PMHx of ESRD s/p renal transplant (1992, 2013 (left kidney)), HTN, TIIDM, and prior HCV presenting with several days of anuria, dysuria, 9/10 sharp back pain radiating to abdomen, and LE swelling. Differential diagnosis includes pyelonephritis vs. Plasma cell disease leading to renal insult. Etiology of volume overload unclear. Lack of significant WBC increase, negative UA, and lack of urinary symptoms make pyelonephritis less likely. FeNa 1.3% suggesting intrinsic renal pathology. Pt previously with anuria and now making urine on admission. Presentation likely due to renal insult from MGUS vs multiple myeloma    Updates 11/17:  -Lasix 40 mg for removing volume  -Will continue ceftriaxone per nephrology  -Strict I/Os  -Monitor BP, can increase hydralazine dose if not response on higher coreg  -Transplant nephrology consulted, appreciate recs  -Pending EBV, BK, and CMV per renal  -Tramadol for back pain, consider robaxin if not effective.     #ISIAH on CKD3 (Scr 3.42, baseline 2.8)  #S/p renal transplant (1992, 2013)  ::POCUS ruled out hydronephrosis  ::CTA/P showing perinephric fat stranding and anasarca  ::FeNa 1.3%  -Pt having " urine output in ED prior to Lasix  -Strict I/Os  -Nephrology consulted, appreciate recs  -Consult heme-onc for MGUS vs multiple myeloma workup  -Consider Lasix challenge if urine output slows  -Continue home prednisone 10 mg  -Continue home tacrolimus 2.5 mg  -Continue home cinacalcet 30 mg  -Continue home cholecalciferol  -Hold home losartan  -Hold home azathioprine    #Possible pyelonephritis  -IV ceftriaxone continued   -Fu urine culture  -UA negative   -Trend WBC     #HTN  #HLD  -Continue home hydralazine 25 mg TID  -Continue home amlodipine 10 mg daily  -Hold home losartan      #Back pain  ::MRI 09/19/2023 showing nonspecific focus of abnormal signal in R pedicle of L4 c/w osseous hemangioma, stress fracture, or osteoid osteoma. No discitis or osteomyelitis. No evidence of metastasis.  -Tylenol 975 mg q8h   -Lidocaine patch  -Tramadol started  -Consider robaxin if ineffective     #TIIDM  -Insulin glargine 12 U daily  -Moderate SSI     F: PRN  E: PRN  N; Low potassium  Ppx: SQH     Full Code  NOK: Amalia Enriquez, significant other - [phone]     Guillermo Oneal MD  PGY-1  [place] [id number]           Guillermo Oneal MD  PGY-1  [place] [id number]

## 2023-11-17 NOTE — PROGRESS NOTES
"Physical Therapy    Physical Therapy Evaluation    Patient Name: Manolo Bashir  MRN: [id number]  Today's Date: 11/17/2023   Time Calculation  Start Time: 1015  Stop Time: 1030  Time Calculation (min): 15 min    Assessment/Plan   PT Assessment  PT Assessment Results:  (No current impairments.)  Rehab Prognosis:  (No current rehab needs.)  Barriers to Discharge: none  End of Session Communication: Bedside nurse  End of Session Patient Position: Bed, 2 rail up, Alarm off, not on at start of session  IP OR SWING BED PT PLAN  Inpatient or Swing Bed: Inpatient  PT Plan  Treatment/Interventions:  (No planned treatment/interventions.)  PT Plan: PT Eval only  PT Eval Only Reason: No acute PT needs identified  PT Frequency: PT eval only  PT Discharge Recommendations: No further acute PT  PT - OK to Discharge: Yes      Subjective   General Visit Information:  General  Reason for Referral: urinary issues and BLE swelling  Past Medical History Relevant to Rehab: 68 year old male with PMHx of ESRD s/p renal transplant (1992, 2013 (left kidney)), HTN, T2DM, hx of HCV who presents with difficulty urinating for the past 2 days and abdominal/lower extremity edema. Also w back pain.  Patient Position Received: Bed, 2 rail up  General Comment: Pt sitting at EOB upon entry. Reports not feeling well due to back discomfort though otherwise very pleasant and cooperative. Pt commented, \"I actually was wanting to try to get up and move around.\"  Home Living:  Home Living  Type of Home: House  Lives With:  (Family can assist if needed.)  Home Adaptive Equipment: None  Home Layout: Two level  Home Access: No concerns  Prior Level of Function:  Prior Function Per Pt/Caregiver Report  Level of [place]: Independent with ADLs and functional transfers, Independent with homemaking with ambulation  Receives Help From: Family  Prior Function Comments: Independent without difficulty. Pt notes still exercising as well as working on " cars.  Precautions:  Precautions  Medical Precautions: Fall precautions  Vital Signs:       Objective   Pain:  Pain Assessment  Pain Assessment: 0-10  Pain Score: 6  Pain Location: Back  Cognition:  Cognition  Overall Cognitive Status: Within Functional Limits  Orientation Level: Oriented X4  Attention: Within Functional Limits  Insight: Within function limits    General Assessments:  Activity Tolerance  Endurance: Endurance does not limit participation in activity    Sensation  Light Touch: No apparent deficits    Strength  Strength Comments: WFL    Perception  Inattention/Neglect: Appears intact    Coordination  Movements are Fluid and Coordinated: Yes    Postural Control  Postural Control: Within Functional Limits    Static Sitting Balance  Static Sitting-Level of Assistance: Independent    Static Standing Balance  Static Standing-Level of Assistance: Independent  Functional Assessments:       Bed Mobility  Bed Mobility: Yes  Bed Mobility 1  Bed Mobility 1: Supine to sitting, Sitting to supine  Level of Assistance 1: Independent    Transfers  Transfer: Yes  Transfer 1  Transfer From 1: Sit to, Stand to  Transfer to 1: Stand, Sit  Transfer Level of Assistance 1: Independent    Ambulation/Gait Training  Ambulation/Gait Training Performed: Yes  Ambulation/Gait Training 1  Surface 1: Level tile  Device 1: No device  Assistance 1: Distant supervision  Quality of Gait 1:  (Steady gait, no acute LOB. Pt notes gait feeling at/near baseline.)  Comments/Distance (ft) 1: 400ft    Stairs  Stairs: No    Outcome Measures:  [facility] Basic Mobility  Turning from your back to your side while in a flat bed without using bedrails: None  Moving from lying on your back to sitting on the side of a flat bed without using bedrails: None  Moving to and from bed to chair (including a wheelchair): None  Standing up from a chair using your arms (e.g. wheelchair or bedside chair): None  To walk in hospital room: None  Climbing 3-5 steps with  railing: None  Basic Mobility - Total Score: 24    Encounter Problems       Encounter Problems (Active)       Safety       STG - Patient locks brakes on wheelchair       Start:  11/16/23    Expected End:  11/19/23                   Education Documentation  Mobility Training, taught by Adal Schaefer, PT at 11/17/2023 10:55 AM.  Learner: Patient  Readiness: Acceptance  Method: Explanation  Response: Verbalizes Understanding  Comment: No acute PT needs.    Education Comments  No comments found.

## 2023-11-17 NOTE — CARE PLAN
The patient's goals for the shift include      The clinical goals for the shift include  Pt to remain pain free

## 2023-11-18 ENCOUNTER — APPOINTMENT (OUTPATIENT)
Dept: CARDIOLOGY | Facility: HOSPITAL | Age: 68
End: 2023-11-18
Payer: COMMERCIAL

## 2023-11-18 LAB
ALBUMIN SERPL BCP-MCNC: 2.6 G/DL (ref 3.4–5)
ANION GAP SERPL CALC-SCNC: 12 MMOL/L (ref 10–20)
AORTIC VALVE MEAN GRADIENT: 11
AORTIC VALVE PEAK VELOCITY: 2.21
AV PEAK GRADIENT: 19.5
AVA (PEAK VEL): 2.67
AVA (VTI): 2.65
BASOPHILS # BLD AUTO: 0.01 X10*3/UL (ref 0–0.1)
BASOPHILS NFR BLD AUTO: 0.3 %
BUN SERPL-MCNC: 50 MG/DL (ref 6–23)
CALCIUM SERPL-MCNC: 8.4 MG/DL (ref 8.6–10.6)
CHLORIDE SERPL-SCNC: 111 MMOL/L (ref 98–107)
CO2 SERPL-SCNC: 22 MMOL/L (ref 21–32)
COLLECT DURATION TIME SPEC: 24 HRS
CREAT SERPL-MCNC: 3.42 MG/DL (ref 0.5–1.3)
EJECTION FRACTION APICAL 4 CHAMBER: 65
EJECTION FRACTION: 64
EOSINOPHIL # BLD AUTO: 0.06 X10*3/UL (ref 0–0.7)
EOSINOPHIL NFR BLD AUTO: 1.8 %
ERYTHROCYTE [DISTWIDTH] IN BLOOD BY AUTOMATED COUNT: 16.3 % (ref 11.5–14.5)
GFR SERPL CREATININE-BSD FRML MDRD: 19 ML/MIN/1.73M*2
GLUCOSE BLD MANUAL STRIP-MCNC: 184 MG/DL (ref 74–99)
GLUCOSE BLD MANUAL STRIP-MCNC: 203 MG/DL (ref 74–99)
GLUCOSE BLD MANUAL STRIP-MCNC: 238 MG/DL (ref 74–99)
GLUCOSE SERPL-MCNC: 175 MG/DL (ref 74–99)
HCT VFR BLD AUTO: 29.3 % (ref 41–52)
HGB BLD-MCNC: 9.6 G/DL (ref 13.5–17.5)
IMM GRANULOCYTES # BLD AUTO: 0.02 X10*3/UL (ref 0–0.7)
IMM GRANULOCYTES NFR BLD AUTO: 0.6 % (ref 0–0.9)
LEFT VENTRICLE INTERNAL DIMENSION DIASTOLE: 5.53 (ref 3.5–6)
LEFT VENTRICULAR OUTFLOW TRACT DIAMETER: 2.26
LYMPHOCYTES # BLD AUTO: 0.83 X10*3/UL (ref 1.2–4.8)
LYMPHOCYTES NFR BLD AUTO: 24.4 %
MAGNESIUM SERPL-MCNC: 1.86 MG/DL (ref 1.6–2.4)
MCH RBC QN AUTO: 28.8 PG (ref 26–34)
MCHC RBC AUTO-ENTMCNC: 32.8 G/DL (ref 32–36)
MCV RBC AUTO: 88 FL (ref 80–100)
MONOCYTES # BLD AUTO: 0.31 X10*3/UL (ref 0.1–1)
MONOCYTES NFR BLD AUTO: 9.1 %
NEUTROPHILS # BLD AUTO: 2.17 X10*3/UL (ref 1.2–7.7)
NEUTROPHILS NFR BLD AUTO: 63.8 %
NRBC BLD-RTO: 0 /100 WBCS (ref 0–0)
PHOSPHATE SERPL-MCNC: 3 MG/DL (ref 2.5–4.9)
PLATELET # BLD AUTO: 93 X10*3/UL (ref 150–450)
POTASSIUM SERPL-SCNC: 4.1 MMOL/L (ref 3.5–5.3)
PROT 24H UR-MCNC: 654 MG/DL (ref 5–25)
RBC # BLD AUTO: 3.33 X10*6/UL (ref 4.5–5.9)
RIGHT VENTRICLE FREE WALL PEAK S': 15.9
SODIUM SERPL-SCNC: 141 MMOL/L (ref 136–145)
SPECIMEN VOL 24H UR: 900 ML
TACROLIMUS BLD-MCNC: 8.5 NG/ML
TOTAL PROTEIN (MG/24HR) IN 24 HOUR URINE UPE: 5886 MG/24H
TRICUSPID ANNULAR PLANE SYSTOLIC EXCURSION: 2.6
WBC # BLD AUTO: 3.4 X10*3/UL (ref 4.4–11.3)

## 2023-11-18 PROCEDURE — 2500000005 HC RX 250 GENERAL PHARMACY W/O HCPCS

## 2023-11-18 PROCEDURE — 80069 RENAL FUNCTION PANEL: CPT

## 2023-11-18 PROCEDURE — 86325 OTHER IMMUNOELECTROPHORESIS: CPT

## 2023-11-18 PROCEDURE — 85025 COMPLETE CBC W/AUTO DIFF WBC: CPT

## 2023-11-18 PROCEDURE — 36415 COLL VENOUS BLD VENIPUNCTURE: CPT | Performed by: HOSPITALIST

## 2023-11-18 PROCEDURE — 84156 ASSAY OF PROTEIN URINE: CPT

## 2023-11-18 PROCEDURE — 2500000001 HC RX 250 WO HCPCS SELF ADMINISTERED DRUGS (ALT 637 FOR MEDICARE OP)

## 2023-11-18 PROCEDURE — 84166 PROTEIN E-PHORESIS/URINE/CSF: CPT

## 2023-11-18 PROCEDURE — 2500000002 HC RX 250 W HCPCS SELF ADMINISTERED DRUGS (ALT 637 FOR MEDICARE OP, ALT 636 FOR OP/ED)

## 2023-11-18 PROCEDURE — 93325 DOPPLER ECHO COLOR FLOW MAPG: CPT | Performed by: STUDENT IN AN ORGANIZED HEALTH CARE EDUCATION/TRAINING PROGRAM

## 2023-11-18 PROCEDURE — 99233 SBSQ HOSP IP/OBS HIGH 50: CPT | Performed by: HOSPITALIST

## 2023-11-18 PROCEDURE — 93325 DOPPLER ECHO COLOR FLOW MAPG: CPT

## 2023-11-18 PROCEDURE — 36415 COLL VENOUS BLD VENIPUNCTURE: CPT

## 2023-11-18 PROCEDURE — 83735 ASSAY OF MAGNESIUM: CPT

## 2023-11-18 PROCEDURE — 86832 HLA CLASS I HIGH DEFIN QUAL: CPT | Performed by: HOSPITALIST

## 2023-11-18 PROCEDURE — 93321 DOPPLER ECHO F-UP/LMTD STD: CPT | Performed by: STUDENT IN AN ORGANIZED HEALTH CARE EDUCATION/TRAINING PROGRAM

## 2023-11-18 PROCEDURE — 93308 TTE F-UP OR LMTD: CPT | Performed by: STUDENT IN AN ORGANIZED HEALTH CARE EDUCATION/TRAINING PROGRAM

## 2023-11-18 PROCEDURE — 82947 ASSAY GLUCOSE BLOOD QUANT: CPT

## 2023-11-18 PROCEDURE — 2500000004 HC RX 250 GENERAL PHARMACY W/ HCPCS (ALT 636 FOR OP/ED)

## 2023-11-18 PROCEDURE — 80197 ASSAY OF TACROLIMUS: CPT

## 2023-11-18 PROCEDURE — 86335 IMMUNFIX E-PHORSIS/URINE/CSF: CPT

## 2023-11-18 PROCEDURE — 1100000001 HC PRIVATE ROOM DAILY

## 2023-11-18 PROCEDURE — 99233 SBSQ HOSP IP/OBS HIGH 50: CPT | Performed by: STUDENT IN AN ORGANIZED HEALTH CARE EDUCATION/TRAINING PROGRAM

## 2023-11-18 RX ORDER — FUROSEMIDE 10 MG/ML
80 INJECTION INTRAMUSCULAR; INTRAVENOUS ONCE
Status: COMPLETED | OUTPATIENT
Start: 2023-11-18 | End: 2023-11-18

## 2023-11-18 RX ORDER — FUROSEMIDE 40 MG/1
80 TABLET ORAL ONCE
Status: DISCONTINUED | OUTPATIENT
Start: 2023-11-18 | End: 2023-11-18

## 2023-11-18 RX ORDER — TRAMADOL HYDROCHLORIDE 50 MG/1
50 TABLET ORAL EVERY 12 HOURS PRN
Status: DISCONTINUED | OUTPATIENT
Start: 2023-11-18 | End: 2023-11-22 | Stop reason: HOSPADM

## 2023-11-18 RX ORDER — INSULIN GLARGINE 100 [IU]/ML
20 INJECTION, SOLUTION SUBCUTANEOUS DAILY
Status: DISCONTINUED | OUTPATIENT
Start: 2023-11-18 | End: 2023-11-22 | Stop reason: HOSPADM

## 2023-11-18 RX ORDER — ACETAMINOPHEN 500 MG
5 TABLET ORAL NIGHTLY PRN
Status: DISCONTINUED | OUTPATIENT
Start: 2023-11-18 | End: 2023-11-22 | Stop reason: HOSPADM

## 2023-11-18 RX ADMIN — INSULIN GLARGINE 20 UNITS: 100 INJECTION, SOLUTION SUBCUTANEOUS at 09:50

## 2023-11-18 RX ADMIN — HYDRALAZINE HYDROCHLORIDE 25 MG: 25 TABLET, FILM COATED ORAL at 14:42

## 2023-11-18 RX ADMIN — ACETAMINOPHEN 975 MG: 325 TABLET ORAL at 23:40

## 2023-11-18 RX ADMIN — CINACALCET 30 MG: 30 TABLET ORAL at 08:35

## 2023-11-18 RX ADMIN — FERROUS SULFATE TAB 325 MG (65 MG ELEMENTAL FE) 65 MG OF IRON: 325 (65 FE) TAB at 20:23

## 2023-11-18 RX ADMIN — METHOCARBAMOL TABLETS 500 MG: 500 TABLET, COATED ORAL at 06:04

## 2023-11-18 RX ADMIN — Medication 5 MG: at 20:23

## 2023-11-18 RX ADMIN — Medication 2000 UNITS: at 08:35

## 2023-11-18 RX ADMIN — TACROLIMUS 2.5 MG: 0.5 CAPSULE ORAL at 06:04

## 2023-11-18 RX ADMIN — INSULIN LISPRO 4 UNITS: 100 INJECTION, SOLUTION INTRAVENOUS; SUBCUTANEOUS at 17:49

## 2023-11-18 RX ADMIN — PRAVASTATIN SODIUM 40 MG: 20 TABLET ORAL at 20:23

## 2023-11-18 RX ADMIN — PREDNISONE 10 MG: 5 TABLET ORAL at 08:35

## 2023-11-18 RX ADMIN — CARVEDILOL 37.5 MG: 25 TABLET, FILM COATED ORAL at 20:22

## 2023-11-18 RX ADMIN — TACROLIMUS 2.5 MG: 0.5 CAPSULE ORAL at 18:32

## 2023-11-18 RX ADMIN — CEFTRIAXONE SODIUM 1 G: 1 INJECTION, SOLUTION INTRAVENOUS at 09:07

## 2023-11-18 RX ADMIN — CARVEDILOL 37.5 MG: 25 TABLET, FILM COATED ORAL at 08:36

## 2023-11-18 RX ADMIN — ACETAMINOPHEN 975 MG: 325 TABLET ORAL at 08:35

## 2023-11-18 RX ADMIN — AMLODIPINE BESYLATE 10 MG: 10 TABLET ORAL at 08:35

## 2023-11-18 RX ADMIN — METHOCARBAMOL TABLETS 500 MG: 500 TABLET, COATED ORAL at 14:42

## 2023-11-18 RX ADMIN — HYDRALAZINE HYDROCHLORIDE 25 MG: 25 TABLET, FILM COATED ORAL at 20:23

## 2023-11-18 RX ADMIN — INSULIN LISPRO 4 UNITS: 100 INJECTION, SOLUTION INTRAVENOUS; SUBCUTANEOUS at 14:41

## 2023-11-18 RX ADMIN — FERROUS SULFATE TAB 325 MG (65 MG ELEMENTAL FE) 65 MG OF IRON: 325 (65 FE) TAB at 08:35

## 2023-11-18 RX ADMIN — LORATADINE 10 MG: 10 TABLET ORAL at 08:35

## 2023-11-18 RX ADMIN — FUROSEMIDE 80 MG: 10 INJECTION, SOLUTION INTRAMUSCULAR; INTRAVENOUS at 16:22

## 2023-11-18 RX ADMIN — HYDRALAZINE HYDROCHLORIDE 25 MG: 25 TABLET, FILM COATED ORAL at 08:36

## 2023-11-18 RX ADMIN — LIDOCAINE 1 PATCH: 4 PATCH TOPICAL at 08:36

## 2023-11-18 RX ADMIN — INSULIN LISPRO 2 UNITS: 100 INJECTION, SOLUTION INTRAVENOUS; SUBCUTANEOUS at 09:48

## 2023-11-18 RX ADMIN — METHOCARBAMOL TABLETS 500 MG: 500 TABLET, COATED ORAL at 22:00

## 2023-11-18 RX ADMIN — ACETAMINOPHEN 975 MG: 325 TABLET ORAL at 14:42

## 2023-11-18 ASSESSMENT — COGNITIVE AND FUNCTIONAL STATUS - GENERAL
MOBILITY SCORE: 24
DAILY ACTIVITIY SCORE: 24

## 2023-11-18 ASSESSMENT — ACTIVITIES OF DAILY LIVING (ADL)
LACK_OF_TRANSPORTATION: NO
LACK_OF_TRANSPORTATION: NO

## 2023-11-18 ASSESSMENT — PAIN SCALES - GENERAL: PAINLEVEL_OUTOF10: 10 - WORST POSSIBLE PAIN

## 2023-11-18 NOTE — PROGRESS NOTES
Transplant Nephrology progress note     Date of admission: 11/15/2023     Manolo Bashir is a 68 y.o.  with [facility]   Past Medical History:   Diagnosis Date    Chronic kidney disease, stage 3 unspecified (CMS/HCC) 09/26/2018    Stage 3 chronic kidney disease    COVID-19 06/18/2020    COVID-19 virus infection    Diabetes (CMS/[facility])     HTN (hypertension)     Other long term (current) drug therapy 07/20/2021    High risk medication use    Personal history of other diseases of the circulatory system     Personal history of cardiac murmur    Personal history of other infectious and parasitic diseases 08/17/2015    History of hepatitis    Polyp, colonic 08/17/2023    Primary osteoarthritis of both ankles 08/17/2023    Tubular adenoma of colon 08/17/2023    Unspecified kidney failure 08/17/2016    Renal failure        SUBJECTIVE:    Patient still complaining of some back pain but is is a sleepy this morning does not want to answer all the questions.  Urine output documented is around 600 cc.    PROBLEM LIST:  Principal Problem:    Pyelonephritis         ALLERGIES:  No Known Allergies         CURRENT MEDICATIONS:  Scheduled medications  acetaminophen, 975 mg, oral, q8h  amLODIPine, 10 mg, oral, Daily  [Held by provider] azaTHIOprine, 25 mg, oral, Daily  carvedilol, 37.5 mg, oral, BID  cholecalciferol, 2,000 Units, oral, Daily  cinacalcet, 30 mg, oral, Daily  ferrous sulfate (325 mg ferrous sulfate), 65 mg of iron, oral, BID  hydrALAZINE, 25 mg, oral, TID  insulin glargine, 20 Units, subcutaneous, Daily  insulin lispro, 0-10 Units, subcutaneous, TID with meals  lidocaine, 1 patch, transdermal, Daily  loratadine, 10 mg, oral, Daily  [Held by provider] losartan, 25 mg, oral, Daily  melatonin, 5 mg, oral, Nightly  methocarbamol, 500 mg, oral, q8h ZOË  pravastatin, 40 mg, oral, Nightly  predniSONE, 10 mg, oral, q AM  tacrolimus, 2.5 mg, oral, q12h ZOË      Continuous medications     PRN medications  PRN medications: dextrose  "10 % in water (D10W), dextrose, diclofenac sodium, docusate sodium, glucagon, melatonin, polyethylene glycol, traMADol       OBJECTIVE:    VITALS: Visit Vitals  /81 (BP Location: Right arm, Patient Position: Lying)   Pulse 70   Temp 37.3 °C (99.1 °F) (Tympanic)   Resp 16   Ht 1.727 m (5' 8\")   Wt 75.3 kg (166 lb)   SpO2 96%   BMI 25.24 kg/m²   Smoking Status Never   BSA 1.9 m²          General: No distress   Mucosa moist   AI, AC, AF     HEENT: PEERLA  CVS: S1 S2 no murmurs  RESP:  Lungs clear to auscultation   ABDO: Soft, non-tender   Neuro: A + O x 3  Skin: No rash   Extremities: No edema       LABS:  Results from last 72 hours   Lab Units 11/18/23  0613   WBC AUTO x10*3/uL 3.4*   HEMOGLOBIN g/dL 9.6*   MCV fL 88   PLATELETS AUTO x10*3/uL 93*   BUN mg/dL 50*   CREATININE mg/dL 3.42*   CALCIUM mg/dL 8.4*   TACROLIMUS ng/mL 8.5              Intake/Output Summary (Last 24 hours) at 11/18/2023 1315  Last data filed at 11/18/2023 0842  Gross per 24 hour   Intake --   Output 900 ml   Net -900 ml            ASSESSMENT AND PLAN:  Manolo Bashir is a 68 y.o. male with PMHX of ESRD s/p 2 renal transplants (1992 followed by 2013), CKD 3, DM2, PRCA s/p rdical prostatectomy and HCV presented to the ED on 11/15 with chief complaint of difficulty urinating for the last few days along with increased swelling in the lower extremities .  Transplant nephrology consulted for further management of kidney transplant.    Allograft function: Acute kidney injury on chronic kidney disease:  -His a baseline creatinine is in the range of 2.8-2.5 recently.  Urine analysis is showing +3 protein and no signs of infection. CAT scan showing mild stranding does not look like a urinary tract infection but follow-up with the cultures before discontinuing antibiotics.  Urine lites corresponding to intrinsic renal disease.  Patient seems to have a Known monoclonal IgA lambda in the beta region at 0.2 g/dL, monoclonal IgG lambda in the gamma " region at 0.1 g/dL, and monoclonal IgG kappa in the gamma region at 0.1 g/dL. The former two bands were last detected on 10/18/22 at 0.1 g/dL and 0.1 g/dL  -Mild uptrend in the creatinine noticed.  UOP documented is 600 c with IV lasix-can consider increasing the Lasix dose.  -If kidney function does not improve over the weekend tentatively plan for biopsy on Monday -appreciate heme onc input.  -EBV, BK negative  and CMV are pending at this time.  We will follow-up with the DSA.  -   Immunosuppression: Tacrolimus levels 8.5   Please continue with 2.5 twice daily .  Continue with prednisone 10 mg daily.  Hold azathioprine in the setting of multiple myeloma/MGUS    Anemia/leukopenia: Currently on iron supplementation continue for now.    Bone mineral disease: Calcium levels currently optimal if still downtrending consider stopping Cinacalcet.    Hemodynamics:  Continue with amlodipine, carvedilol and hydralazine and uptitrate hydralazine as needed.      Thank you for consulting .  Dina Benoit MD

## 2023-11-18 NOTE — PROGRESS NOTES
"Manolo Bashir is a 68 y.o. male on day 2 of admission presenting with Pyelonephritis.    Subjective   Pt seen and examined this morning. Pt resting in bed. Reports not sleeping well d/t his usual pain in his back. Has not urinated much in the last 24h. No fevers, chills, HA, N/V/D, abdominal pain, or urinary sx.          Objective   Last Recorded Vitals  Blood pressure 166/84, pulse 88, temperature 37.6 °C (99.7 °F), resp. rate 18, height 1.727 m (5' 8\"), weight 75.3 kg (166 lb), SpO2 99 %.    Physical Exam  Vitals reviewed.   Constitutional:       General: He is not in acute distress.     Appearance: Normal appearance. He is normal weight.   HENT:      Head: Normocephalic and atraumatic.      Right Ear: Tympanic membrane normal.      Left Ear: Tympanic membrane normal.      Nose: Nose normal.      Mouth/Throat:      Mouth: Mucous membranes are moist.      Pharynx: Oropharynx is clear.   Eyes:      Extraocular Movements: Extraocular movements intact.      Conjunctiva/sclera: Conjunctivae normal.      Pupils: Pupils are equal, round, and reactive to light.   Cardiovascular:      Rate and Rhythm: Normal rate and regular rhythm.      Pulses: Normal pulses.      Heart sounds: Normal heart sounds. No murmur heard.     No friction rub. No gallop.   Pulmonary:      Effort: Pulmonary effort is normal. No respiratory distress.      Breath sounds: Normal breath sounds. No wheezing, rhonchi or rales.   Chest:      Chest wall: No tenderness.   Abdominal:      General: Abdomen is flat. Bowel sounds are normal. There is distension.      Palpations: Abdomen is soft. There is no mass.      Tenderness: There is no abdominal tenderness. There is no right CVA tenderness, left CVA tenderness, guarding or rebound.      Hernia: No hernia is present.      Comments: Distended, nontender, soft   Musculoskeletal:         General: Normal range of motion.      Right lower leg: Edema present.      Left lower leg: Edema present.      Comments: " R sided mid thoracic tenderness   Skin:     General: Skin is warm and dry.      Capillary Refill: Capillary refill takes less than 2 seconds.   Neurological:      General: No focal deficit present.      Mental Status: He is alert and oriented to person, place, and time. Mental status is at baseline.   Psychiatric:         Mood and Affect: Mood normal.         Behavior: Behavior normal.         Thought Content: Thought content normal.         Judgment: Judgment normal.         Intake/Output last 3 Shifts:  I/O last 3 completed shifts:  In: - (0 mL/kg)   Out: 600 (8 mL/kg) [Urine:600 (0.2 mL/kg/hr)]  Weight: 75.3 kg     Relevant Results  Results from last 7 days   Lab Units 11/18/23  0613 11/17/23  1830 11/17/23  0907   WBC AUTO x10*3/uL 3.4* 3.8* 4.0*   HEMOGLOBIN g/dL 9.6* 9.6* 9.9*   HEMATOCRIT % 29.3* 31.3* 30.7*   PLATELETS AUTO x10*3/uL 93* 86* 102*        Results from last 7 days   Lab Units 11/18/23  0613 11/17/23  1830 11/17/23  0907 11/16/23  1411 11/15/23  1413 11/13/23  1021   SODIUM mmol/L 141 138 141   < > 144 138   POTASSIUM mmol/L 4.1 4.8 4.3   < > 5.5* 4.7   CHLORIDE mmol/L 111* 109* 113*   < > 114* 110*   CO2 mmol/L 22 21 22   < > 24 24   BUN mg/dL 50* 52* 49*   < > 43* 40*   CREATININE mg/dL 3.42* 3.30* 3.34*   < > 3.42* 2.87*   CALCIUM mg/dL 8.4* 8.2* 8.7   < > 8.8 8.7   PROTEIN TOTAL g/dL  --   --   --   --  5.5* 5.5*   BILIRUBIN TOTAL mg/dL  --   --   --   --  0.4 0.4   ALK PHOS U/L  --   --   --   --  74 76   ALT U/L  --   --   --   --  24 31   AST U/L  --   --   --   --  14 15   GLUCOSE mg/dL 175* 308* 210*   < > 220* 180*    < > = values in this interval not displayed.            Assessment/Plan   Principal Problem:    Pyelonephritis    Manolo Bashir is a 68 y.o. male with a PMHx of ESRD s/p renal transplant (1992, 2013 (left kidney)), HTN, TIIDM, and prior HCV presenting with several days of anuria, dysuria, 9/10 sharp back pain radiating to abdomen, and LE swelling. Differential diagnosis  includes pyelonephritis vs. Plasma cell disease leading to renal insult. Etiology of volume overload unclear. Lack of significant WBC increase, negative UA, and lack of urinary symptoms make pyelonephritis less likely. FeNa 1.3% suggesting intrinsic renal pathology. Pt previously with anuria and now making urine on admission. Presentation likely due to renal insult from MGUS vs multiple myeloma    Updates 11/18:  - will add home tramadol 50mg q12h prn for pain   - melatonin prn for sleep   - discontinue ceftriaxone as now with low suspicion for pyelonephritis   - will obtain bladder scan      #ISIAH on CKD3 (Scr 3.42, baseline 2.8)  #S/p renal transplant (1992, 2013)  ::POCUS ruled out hydronephrosis  ::CTA/P showing perinephric fat stranding and anasarca  ::FeNa 1.3%  -Strict I/Os  -Nephrology consulted, appreciate recs  -Consult heme-onc for MGUS vs multiple myeloma workup      - recommending skeletal survery (CT scan vs MRI)      - recommending possible renal bx       - rest of work up to be done outpatient   -Continue home prednisone 10 mg  -Continue home tacrolimus 2.5 mg  -Continue home cinacalcet 30 mg  -Continue home cholecalciferol  -Hold home losartan  -Hold home azathioprine    #Possible pyelonephritis - now low suspicion   -s/p ceftriaxone 11/16 - 11/18  -UA negative - no reflex to cx  -Trend WBC     #HTN  #HLD  - increase home carvedilol from 25mg BID to 37.5mg BID  - Continue home hydralazine 25 mg TID  - Continue home amlodipine 10 mg daily  - Hold home losartan      #Back pain  ::MRI 09/19/2023 showing nonspecific focus of abnormal signal in R pedicle of L4 c/w osseous hemangioma, stress fracture, or osteoid osteoma. No discitis or osteomyelitis. No evidence of metastasis.  - Tylenol 975 mg q8h   - Lidocaine patch  - Tramadol 50mg q12 prn  - methocarbamol 500mg TID       #TIIDM  - increase Insulin glargine from 12 U daily to 20 U daily (home dose is 24U daily)  - Moderate SSI     F: PRN  E: PRN  N; Low  potassium  Ppx: SQH     Full Code  NOK: Amalia Enriquez, significant other - [phone]     Edward Herman MD   Internal Medicine, PGY-2

## 2023-11-19 ENCOUNTER — APPOINTMENT (OUTPATIENT)
Dept: RADIOLOGY | Facility: HOSPITAL | Age: 68
End: 2023-11-19
Payer: COMMERCIAL

## 2023-11-19 LAB
ALBUMIN SERPL BCP-MCNC: 2.8 G/DL (ref 3.4–5)
ANION GAP SERPL CALC-SCNC: 11 MMOL/L (ref 10–20)
BASOPHILS # BLD AUTO: 0.02 X10*3/UL (ref 0–0.1)
BASOPHILS NFR BLD AUTO: 0.5 %
BUN SERPL-MCNC: 49 MG/DL (ref 6–23)
CALCIUM SERPL-MCNC: 8.7 MG/DL (ref 8.6–10.6)
CHLORIDE SERPL-SCNC: 112 MMOL/L (ref 98–107)
CO2 SERPL-SCNC: 23 MMOL/L (ref 21–32)
CREAT SERPL-MCNC: 3.3 MG/DL (ref 0.5–1.3)
EOSINOPHIL # BLD AUTO: 0.08 X10*3/UL (ref 0–0.7)
EOSINOPHIL NFR BLD AUTO: 2.1 %
ERYTHROCYTE [DISTWIDTH] IN BLOOD BY AUTOMATED COUNT: 15.7 % (ref 11.5–14.5)
GFR SERPL CREATININE-BSD FRML MDRD: 20 ML/MIN/1.73M*2
GLUCOSE BLD MANUAL STRIP-MCNC: 153 MG/DL (ref 74–99)
GLUCOSE BLD MANUAL STRIP-MCNC: 168 MG/DL (ref 74–99)
GLUCOSE BLD MANUAL STRIP-MCNC: 281 MG/DL (ref 74–99)
GLUCOSE SERPL-MCNC: 160 MG/DL (ref 74–99)
HCT VFR BLD AUTO: 29.7 % (ref 41–52)
HGB BLD-MCNC: 9.9 G/DL (ref 13.5–17.5)
IMM GRANULOCYTES # BLD AUTO: 0.03 X10*3/UL (ref 0–0.7)
IMM GRANULOCYTES NFR BLD AUTO: 0.8 % (ref 0–0.9)
LYMPHOCYTES # BLD AUTO: 1 X10*3/UL (ref 1.2–4.8)
LYMPHOCYTES NFR BLD AUTO: 26.7 %
MAGNESIUM SERPL-MCNC: 1.76 MG/DL (ref 1.6–2.4)
MCH RBC QN AUTO: 28.9 PG (ref 26–34)
MCHC RBC AUTO-ENTMCNC: 33.3 G/DL (ref 32–36)
MCV RBC AUTO: 87 FL (ref 80–100)
MONOCYTES # BLD AUTO: 0.42 X10*3/UL (ref 0.1–1)
MONOCYTES NFR BLD AUTO: 11.2 %
NEUTROPHILS # BLD AUTO: 2.2 X10*3/UL (ref 1.2–7.7)
NEUTROPHILS NFR BLD AUTO: 58.7 %
NRBC BLD-RTO: 0 /100 WBCS (ref 0–0)
PHOSPHATE SERPL-MCNC: 3 MG/DL (ref 2.5–4.9)
PLATELET # BLD AUTO: 93 X10*3/UL (ref 150–450)
POTASSIUM SERPL-SCNC: 3.8 MMOL/L (ref 3.5–5.3)
RBC # BLD AUTO: 3.43 X10*6/UL (ref 4.5–5.9)
SODIUM SERPL-SCNC: 142 MMOL/L (ref 136–145)
TACROLIMUS BLD-MCNC: 20.2 NG/ML
WBC # BLD AUTO: 3.8 X10*3/UL (ref 4.4–11.3)

## 2023-11-19 PROCEDURE — 83735 ASSAY OF MAGNESIUM: CPT

## 2023-11-19 PROCEDURE — 77075 RADEX OSSEOUS SURVEY COMPL: CPT

## 2023-11-19 PROCEDURE — 82947 ASSAY GLUCOSE BLOOD QUANT: CPT

## 2023-11-19 PROCEDURE — 2500000002 HC RX 250 W HCPCS SELF ADMINISTERED DRUGS (ALT 637 FOR MEDICARE OP, ALT 636 FOR OP/ED)

## 2023-11-19 PROCEDURE — 85025 COMPLETE CBC W/AUTO DIFF WBC: CPT

## 2023-11-19 PROCEDURE — 2500000004 HC RX 250 GENERAL PHARMACY W/ HCPCS (ALT 636 FOR OP/ED)

## 2023-11-19 PROCEDURE — 80197 ASSAY OF TACROLIMUS: CPT

## 2023-11-19 PROCEDURE — 1100000001 HC PRIVATE ROOM DAILY

## 2023-11-19 PROCEDURE — 2500000001 HC RX 250 WO HCPCS SELF ADMINISTERED DRUGS (ALT 637 FOR MEDICARE OP)

## 2023-11-19 PROCEDURE — 99233 SBSQ HOSP IP/OBS HIGH 50: CPT

## 2023-11-19 PROCEDURE — 99233 SBSQ HOSP IP/OBS HIGH 50: CPT | Performed by: HOSPITALIST

## 2023-11-19 PROCEDURE — 77075 RADEX OSSEOUS SURVEY COMPL: CPT | Performed by: RADIOLOGY

## 2023-11-19 PROCEDURE — 80069 RENAL FUNCTION PANEL: CPT

## 2023-11-19 PROCEDURE — 2500000005 HC RX 250 GENERAL PHARMACY W/O HCPCS

## 2023-11-19 PROCEDURE — 36415 COLL VENOUS BLD VENIPUNCTURE: CPT

## 2023-11-19 RX ORDER — HEPARIN SODIUM 5000 [USP'U]/ML
5000 INJECTION, SOLUTION INTRAVENOUS; SUBCUTANEOUS EVERY 8 HOURS SCHEDULED
Status: DISCONTINUED | OUTPATIENT
Start: 2023-11-19 | End: 2023-11-22 | Stop reason: HOSPADM

## 2023-11-19 RX ORDER — OXYCODONE HYDROCHLORIDE 5 MG/1
5 TABLET ORAL EVERY 6 HOURS PRN
Status: DISCONTINUED | OUTPATIENT
Start: 2023-11-19 | End: 2023-11-22 | Stop reason: HOSPADM

## 2023-11-19 RX ORDER — FUROSEMIDE 10 MG/ML
80 INJECTION INTRAMUSCULAR; INTRAVENOUS ONCE
Status: COMPLETED | OUTPATIENT
Start: 2023-11-19 | End: 2023-11-19

## 2023-11-19 RX ADMIN — PRAVASTATIN SODIUM 40 MG: 20 TABLET ORAL at 23:28

## 2023-11-19 RX ADMIN — CINACALCET 30 MG: 30 TABLET ORAL at 08:54

## 2023-11-19 RX ADMIN — INSULIN LISPRO 2 UNITS: 100 INJECTION, SOLUTION INTRAVENOUS; SUBCUTANEOUS at 08:59

## 2023-11-19 RX ADMIN — METHOCARBAMOL TABLETS 500 MG: 500 TABLET, COATED ORAL at 23:29

## 2023-11-19 RX ADMIN — OXYCODONE HYDROCHLORIDE 5 MG: 5 TABLET ORAL at 23:29

## 2023-11-19 RX ADMIN — TACROLIMUS 2.5 MG: 0.5 CAPSULE ORAL at 18:23

## 2023-11-19 RX ADMIN — FERROUS SULFATE TAB 325 MG (65 MG ELEMENTAL FE) 65 MG OF IRON: 325 (65 FE) TAB at 23:27

## 2023-11-19 RX ADMIN — INSULIN LISPRO 6 UNITS: 100 INJECTION, SOLUTION INTRAVENOUS; SUBCUTANEOUS at 17:50

## 2023-11-19 RX ADMIN — Medication 5 MG: at 23:27

## 2023-11-19 RX ADMIN — LIDOCAINE 1 PATCH: 4 PATCH TOPICAL at 08:57

## 2023-11-19 RX ADMIN — HYDRALAZINE HYDROCHLORIDE 25 MG: 25 TABLET, FILM COATED ORAL at 23:28

## 2023-11-19 RX ADMIN — METHOCARBAMOL TABLETS 500 MG: 500 TABLET, COATED ORAL at 06:16

## 2023-11-19 RX ADMIN — METHOCARBAMOL TABLETS 500 MG: 500 TABLET, COATED ORAL at 13:37

## 2023-11-19 RX ADMIN — LORATADINE 10 MG: 10 TABLET ORAL at 08:57

## 2023-11-19 RX ADMIN — OXYCODONE HYDROCHLORIDE 5 MG: 5 TABLET ORAL at 17:47

## 2023-11-19 RX ADMIN — ACETAMINOPHEN 975 MG: 325 TABLET ORAL at 23:28

## 2023-11-19 RX ADMIN — TACROLIMUS 2.5 MG: 0.5 CAPSULE ORAL at 06:16

## 2023-11-19 RX ADMIN — FERROUS SULFATE TAB 325 MG (65 MG ELEMENTAL FE) 65 MG OF IRON: 325 (65 FE) TAB at 08:55

## 2023-11-19 RX ADMIN — HYDRALAZINE HYDROCHLORIDE 25 MG: 25 TABLET, FILM COATED ORAL at 15:16

## 2023-11-19 RX ADMIN — OXYCODONE HYDROCHLORIDE 5 MG: 5 TABLET ORAL at 10:15

## 2023-11-19 RX ADMIN — Medication 2000 UNITS: at 08:55

## 2023-11-19 RX ADMIN — FUROSEMIDE 80 MG: 10 INJECTION, SOLUTION INTRAMUSCULAR; INTRAVENOUS at 10:09

## 2023-11-19 RX ADMIN — HYDRALAZINE HYDROCHLORIDE 25 MG: 25 TABLET, FILM COATED ORAL at 08:55

## 2023-11-19 RX ADMIN — CARVEDILOL 37.5 MG: 25 TABLET, FILM COATED ORAL at 08:56

## 2023-11-19 RX ADMIN — PREDNISONE 10 MG: 5 TABLET ORAL at 08:57

## 2023-11-19 RX ADMIN — INSULIN GLARGINE 20 UNITS: 100 INJECTION, SOLUTION SUBCUTANEOUS at 08:58

## 2023-11-19 RX ADMIN — AMLODIPINE BESYLATE 10 MG: 10 TABLET ORAL at 08:56

## 2023-11-19 RX ADMIN — INSULIN LISPRO 2 UNITS: 100 INJECTION, SOLUTION INTRAVENOUS; SUBCUTANEOUS at 13:35

## 2023-11-19 RX ADMIN — CARVEDILOL 37.5 MG: 25 TABLET, FILM COATED ORAL at 23:28

## 2023-11-19 ASSESSMENT — COGNITIVE AND FUNCTIONAL STATUS - GENERAL
DAILY ACTIVITIY SCORE: 24
DAILY ACTIVITIY SCORE: 24
MOBILITY SCORE: 24
MOBILITY SCORE: 24

## 2023-11-19 ASSESSMENT — PAIN SCALES - GENERAL
PAINLEVEL_OUTOF10: 10 - WORST POSSIBLE PAIN
PAINLEVEL_OUTOF10: 0 - NO PAIN
PAINLEVEL_OUTOF10: 10 - WORST POSSIBLE PAIN

## 2023-11-19 ASSESSMENT — PAIN - FUNCTIONAL ASSESSMENT: PAIN_FUNCTIONAL_ASSESSMENT: 0-10

## 2023-11-19 ASSESSMENT — PAIN DESCRIPTION - ORIENTATION: ORIENTATION: LOWER

## 2023-11-19 ASSESSMENT — PAIN DESCRIPTION - LOCATION: LOCATION: BACK

## 2023-11-19 NOTE — PROGRESS NOTES
Transplant Nephrology progress note     Date of admission: 11/15/2023     Manolo Bashir is a 68 y.o.  with [facility]   Past Medical History:   Diagnosis Date    Chronic kidney disease, stage 3 unspecified (CMS/HCC) 09/26/2018    Stage 3 chronic kidney disease    COVID-19 06/18/2020    COVID-19 virus infection    Diabetes (CMS/[facility])     HTN (hypertension)     Other long term (current) drug therapy 07/20/2021    High risk medication use    Personal history of other diseases of the circulatory system     Personal history of cardiac murmur    Personal history of other infectious and parasitic diseases 08/17/2015    History of hepatitis    Polyp, colonic 08/17/2023    Primary osteoarthritis of both ankles 08/17/2023    Tubular adenoma of colon 08/17/2023    Unspecified kidney failure 08/17/2016    Renal failure        SUBJECTIVE:  Patient was a little upset this morning after yesterday's is his sons death anniversary.  Urine output documented is only 600 cc in spite of IV Lasix.  Discussed with him in detail about potential biopsy tomorrow.    PROBLEM LIST:  Principal Problem:    Pyelonephritis         ALLERGIES:  No Known Allergies         CURRENT MEDICATIONS:  Scheduled medications  acetaminophen, 975 mg, oral, q8h  amLODIPine, 10 mg, oral, Daily  [Held by provider] azaTHIOprine, 25 mg, oral, Daily  carvedilol, 37.5 mg, oral, BID  cholecalciferol, 2,000 Units, oral, Daily  cinacalcet, 30 mg, oral, Daily  ferrous sulfate (325 mg ferrous sulfate), 65 mg of iron, oral, BID  heparin (porcine), 5,000 Units, subcutaneous, q8h ZOË  hydrALAZINE, 25 mg, oral, TID  insulin glargine, 20 Units, subcutaneous, Daily  insulin lispro, 0-10 Units, subcutaneous, TID with meals  lidocaine, 1 patch, transdermal, Daily  loratadine, 10 mg, oral, Daily  [Held by provider] losartan, 25 mg, oral, Daily  melatonin, 5 mg, oral, Nightly  methocarbamol, 500 mg, oral, q8h ZOË  pravastatin, 40 mg, oral, Nightly  predniSONE, 10 mg, oral, q  "AM  tacrolimus, 2.5 mg, oral, q12h ZOË      Continuous medications     PRN medications  PRN medications: dextrose 10 % in water (D10W), dextrose, diclofenac sodium, docusate sodium, glucagon, melatonin, oxyCODONE, polyethylene glycol, traMADol       OBJECTIVE:    VITALS: Visit Vitals  /83 (BP Location: Right arm)   Pulse 69   Temp 36.7 °C (98.1 °F) (Temporal)   Resp 20   Ht 1.727 m (5' 8\")   Wt 75.3 kg (166 lb)   SpO2 100%   BMI 25.24 kg/m²   Smoking Status Never   BSA 1.9 m²          General: No distress   Mucosa moist   AI, AC, AF     HEENT: PEERLA  CVS: S1 S2 no murmurs  RESP:  Lungs clear to auscultation   ABDO: Soft, non-tender   Neuro: A + O x 3  Skin: No rash   Extremities: No edema       LABS:  Results from last 72 hours   Lab Units 11/19/23  0849   WBC AUTO x10*3/uL 3.8*   HEMOGLOBIN g/dL 9.9*   MCV fL 87   PLATELETS AUTO x10*3/uL 93*   BUN mg/dL 49*   CREATININE mg/dL 3.30*   CALCIUM mg/dL 8.7   TACROLIMUS ng/mL 20.2*              Intake/Output Summary (Last 24 hours) at 11/19/2023 1441  Last data filed at 11/19/2023 1200  Gross per 24 hour   Intake --   Output 975 ml   Net -975 ml            ASSESSMENT AND PLAN:  Manolo Bashir is a 68 y.o. male with PMHX of ESRD s/p 2 renal transplants (1992 followed by 2013), CKD 3, DM2, PRCA s/p rdical prostatectomy and HCV presented to the ED on 11/15 with chief complaint of difficulty urinating for the last few days along with increased swelling in the lower extremities .  Transplant nephrology consulted for further management of kidney transplant.    Allograft function: Acute kidney injury on chronic kidney disease:stage 4  -His a baseline creatinine is in the range of 2.8-2.5 recently.  Urine analysis is showing +3 protein and no signs of infection. CAT scan showing mild stranding does not look like a urinary tract infection discontinued antibiotics as the cultures are negative..  Urine lites corresponding to intrinsic renal disease.  Patient seems to have a " Known monoclonal IgA lambda in the beta region at 0.2 g/dL, monoclonal IgG lambda in the gamma region at 0.1 g/dL, and monoclonal IgG kappa in the gamma region at 0.1 g/dL. The former two bands were last detected on 10/18/22 at 0.1 g/dL and 0.1 g/dL  -Mild uptrend in the creatinine noticed.  UOP documented is 600 c with IV lasix 80 mg IV.  -Planning biopsy on Monday.  -EBV, BK negative  and CMV are pending at this time.  We will follow-up with the [facility]SA.     Immunosuppression: Tacrolimus levels is 19.2 since it was checked after giving the dose of tacrolimus.   Please continue with 2.5 twice daily .  Continue with prednisone 10 mg daily.  Hold azathioprine in the setting of multiple myeloma/MGUS    Anemia/leukopenia: Currently on iron supplementation continue for now.    Bone mineral disease: Calcium levels currently optimal if still downtrending consider stopping Cinacalcet.    Hemodynamics:  Continue with amlodipine, carvedilol and hydralazine and uptitrate hydralazine as needed.      Thank you for consulting .  Dina Benoit MD

## 2023-11-19 NOTE — DISCHARGE INSTRUCTIONS
Dear Mr. Bashir,    You were admitted to the hospital because you had no urine creation for several days and a significant back pain that started after your biopsy. We checked your labs and saw that your kidney function had worsened. We contacted the transplant nephrologists for your case and they recommended further testing to see what was happening. Additionally, we talked to the blood cancer doctors to see if this was related to your MGUS condition worsening. While you were here, we sent you for a bone scan to see if there were any spread of your MGUS to the bones. We also gave you a water pill to see if your urine output improved. Ultimately, we had to biopsy your kidney to get a clear picture of what was occurring. The kidney doctors felt comfortable discharging you home on your current medications.     We have prescribed you a water pill (furosemide) to take on an as needed basis. Please take one pill daily as needed for leg swelling.     Please have your labs drawn at any  lab on 11/27. You will have follow up with the transplant kidney doctors in the next 2-3 weeks. If you do not hear from their office, please give them a call to schedule.     Take care,   Jo Team   [facility]

## 2023-11-19 NOTE — CARE PLAN
The patient's goals for the shift include      The clinical goals for the shift include Pain management

## 2023-11-19 NOTE — PROGRESS NOTES
"Manolo Bashir is a 68 y.o. male on day 3 of admission presenting with Pyelonephritis.    Subjective   Pt seen and examined this morning. Pt sitting up in bed. Pt continues to endorse pain in back. Pt reported feeling down due to hospitalization and noted his son had passed away recently. Pt made around 600 ml urine in the last 24 hrs. No fevers, chills, HA, N/V/D, abdominal pain, or urinary sx.          Objective   Last Recorded Vitals  Blood pressure 166/84, pulse 88, temperature 37.6 °C (99.7 °F), resp. rate 18, height 1.727 m (5' 8\"), weight 75.3 kg (166 lb), SpO2 99 %.    Physical Exam  Vitals reviewed.   Constitutional:       General: He is not in acute distress.     Appearance: Normal appearance. He is normal weight.   HENT:      Head: Normocephalic and atraumatic.      Right Ear: Tympanic membrane normal.      Left Ear: Tympanic membrane normal.      Nose: Nose normal.      Mouth/Throat:      Mouth: Mucous membranes are moist.      Pharynx: Oropharynx is clear.   Eyes:      Extraocular Movements: Extraocular movements intact.      Conjunctiva/sclera: Conjunctivae normal.      Pupils: Pupils are equal, round, and reactive to light.   Cardiovascular:      Rate and Rhythm: Normal rate and regular rhythm.      Pulses: Normal pulses.      Heart sounds: Normal heart sounds. No murmur heard.     No friction rub. No gallop.   Pulmonary:      Effort: Pulmonary effort is normal. No respiratory distress.      Breath sounds: Normal breath sounds. No wheezing, rhonchi or rales.   Chest:      Chest wall: No tenderness.   Abdominal:      General: Abdomen is flat. Bowel sounds are normal. There is distension.      Palpations: Abdomen is soft. There is no mass.      Tenderness: There is no abdominal tenderness. There is no right CVA tenderness, left CVA tenderness, guarding or rebound.      Hernia: No hernia is present.      Comments: Distended, nontender, soft   Musculoskeletal:         General: Normal range of motion.      " Right lower leg: Edema present.      Left lower leg: Edema present.      Comments: R sided mid thoracic tenderness under R posterior scapula   Skin:     General: Skin is warm and dry.      Capillary Refill: Capillary refill takes less than 2 seconds.   Neurological:      General: No focal deficit present.      Mental Status: He is alert and oriented to person, place, and time. Mental status is at baseline.   Psychiatric:         Mood and Affect: Mood normal.         Behavior: Behavior normal.         Thought Content: Thought content normal.         Judgment: Judgment normal.         Intake/Output last 3 Shifts:  I/O last 3 completed shifts:  In: - (0 mL/kg)   Out: 875 (11.6 mL/kg) [Urine:875 (0.3 mL/kg/hr)]  Weight: 75.3 kg     Relevant Results  Results from last 7 days   Lab Units 11/18/23  0613 11/17/23  1830 11/17/23  0907   WBC AUTO x10*3/uL 3.4* 3.8* 4.0*   HEMOGLOBIN g/dL 9.6* 9.6* 9.9*   HEMATOCRIT % 29.3* 31.3* 30.7*   PLATELETS AUTO x10*3/uL 93* 86* 102*        Results from last 7 days   Lab Units 11/18/23  0613 11/17/23  1830 11/17/23  0907 11/16/23  1411 11/15/23  1413 11/13/23  1021   SODIUM mmol/L 141 138 141   < > 144 138   POTASSIUM mmol/L 4.1 4.8 4.3   < > 5.5* 4.7   CHLORIDE mmol/L 111* 109* 113*   < > 114* 110*   CO2 mmol/L 22 21 22   < > 24 24   BUN mg/dL 50* 52* 49*   < > 43* 40*   CREATININE mg/dL 3.42* 3.30* 3.34*   < > 3.42* 2.87*   CALCIUM mg/dL 8.4* 8.2* 8.7   < > 8.8 8.7   PROTEIN TOTAL g/dL  --   --   --   --  5.5* 5.5*   BILIRUBIN TOTAL mg/dL  --   --   --   --  0.4 0.4   ALK PHOS U/L  --   --   --   --  74 76   ALT U/L  --   --   --   --  24 31   AST U/L  --   --   --   --  14 15   GLUCOSE mg/dL 175* 308* 210*   < > 220* 180*    < > = values in this interval not displayed.           Assessment/Plan   Principal Problem:    Pyelonephritis    Manolo Bashir is a 68 y.o. male with a PMHx of ESRD s/p renal transplant (1992, 2013 (left kidney)), HTN, TIIDM, and prior HCV presenting with  several days of anuria, dysuria, 9/10 sharp back pain radiating to abdomen, and LE swelling. Differential diagnosis includes pyelonephritis vs. Plasma cell disease leading to renal insult. Etiology of volume overload unclear. Lack of significant WBC increase, negative UA, and lack of urinary symptoms make pyelonephritis less likely. FeNa 1.3% suggesting intrinsic renal pathology. Pt previously with anuria and now making urine on admission. Presentation likely due to renal insult from MGUS vs multiple myeloma    Updates 11/18:  - will add oxy 5 mg q6h PRN for severe pain  - Skeletal survey to rule out lytic lesions  - 80 IV Lasix (600 mL UOP yesterday)   - Possible biopsy on 11/20     #ISIAH on CKD3 (Scr 3.42, baseline 2.8)  #S/p renal transplant (1992, 2013)  ::POCUS ruled out hydronephrosis  ::CTA/P showing perinephric fat stranding and anasarca  ::FeNa 1.3%  -Strict I/Os  -Nephrology consulted, appreciate recs  -Consider renal bx on 11/20, follow up plan with nephrology  -Continue home prednisone 10 mg  -Continue home tacrolimus 2.5 mg  -Continue home cinacalcet 30 mg  -Continue home cholecalciferol  -Hold home losartan  -Hold home azathioprine    #Possible pyelonephritis - now low suspicion   -s/p ceftriaxone 11/16 - 11/18  -UA negative - no reflex to cx  -Trend WBC     #HTN  #HLD  - increase home carvedilol from 25mg BID to 37.5mg BID  - Continue home hydralazine 25 mg TID  - Continue home amlodipine 10 mg daily  - Adequate pain control for HTN  - Hold home losartan      #Back pain  ::MRI 09/19/2023 showing nonspecific focus of abnormal signal in R pedicle of L4 c/w osseous hemangioma, stress fracture, or osteoid osteoma. No discitis or osteomyelitis. No evidence of metastasis.  - Oxy 5 mg q6h PRN started for severe pain  - Skeletal survey to r/o lytic lesions  - Tylenol 975 mg q8h   - Lidocaine patch  - Tramadol 50mg q12 prn  - methocarbamol 500mg TID       #TIIDM  - increase Insulin glargine from 12 U daily to  20 U daily (home dose is 24U daily)  - Moderate SSI     F: PRN  E: PRN  N; Low potassium  Ppx: SQH     Full Code  NOK: Amalia Enriquez, significant other - [phone]     Guillermo Oneal MD  PGY-1  [place] [id number]

## 2023-11-20 ENCOUNTER — PREP FOR PROCEDURE (OUTPATIENT)
Dept: RADIOLOGY | Facility: HOSPITAL | Age: 68
End: 2023-11-20

## 2023-11-20 ENCOUNTER — APPOINTMENT (OUTPATIENT)
Dept: RADIOLOGY | Facility: HOSPITAL | Age: 68
End: 2023-11-20
Payer: COMMERCIAL

## 2023-11-20 LAB
ALBUMIN MFR UR ELPH: 77.1 %
ALBUMIN SERPL BCP-MCNC: 2.5 G/DL (ref 3.4–5)
ALBUMIN SERPL BCP-MCNC: 2.8 G/DL (ref 3.4–5)
ALPHA1 GLOB MFR UR ELPH: 2.6 %
ALPHA2 GLOB MFR UR ELPH: 2.3 %
ANION GAP SERPL CALC-SCNC: 11 MMOL/L (ref 10–20)
ANION GAP SERPL CALC-SCNC: 12 MMOL/L (ref 10–20)
B-GLOBULIN MFR UR ELPH: 8.8 %
BASOPHILS # BLD AUTO: 0 X10*3/UL (ref 0–0.1)
BASOPHILS NFR BLD AUTO: 0 %
BUN SERPL-MCNC: 47 MG/DL (ref 6–23)
BUN SERPL-MCNC: 49 MG/DL (ref 6–23)
CALCIUM SERPL-MCNC: 8.1 MG/DL (ref 8.6–10.6)
CALCIUM SERPL-MCNC: 8.4 MG/DL (ref 8.6–10.6)
CHLORIDE SERPL-SCNC: 110 MMOL/L (ref 98–107)
CHLORIDE SERPL-SCNC: 111 MMOL/L (ref 98–107)
CO2 SERPL-SCNC: 23 MMOL/L (ref 21–32)
CO2 SERPL-SCNC: 23 MMOL/L (ref 21–32)
CREAT SERPL-MCNC: 3.16 MG/DL (ref 0.5–1.3)
CREAT SERPL-MCNC: 3.21 MG/DL (ref 0.5–1.3)
EOSINOPHIL # BLD AUTO: 0.05 X10*3/UL (ref 0–0.7)
EOSINOPHIL NFR BLD AUTO: 1.5 %
ERYTHROCYTE [DISTWIDTH] IN BLOOD BY AUTOMATED COUNT: 15.7 % (ref 11.5–14.5)
GAMMA GLOB MFR UR ELPH: 9.2 %
GFR SERPL CREATININE-BSD FRML MDRD: 20 ML/MIN/1.73M*2
GFR SERPL CREATININE-BSD FRML MDRD: 21 ML/MIN/1.73M*2
GLUCOSE BLD MANUAL STRIP-MCNC: 143 MG/DL (ref 74–99)
GLUCOSE BLD MANUAL STRIP-MCNC: 145 MG/DL (ref 74–99)
GLUCOSE BLD MANUAL STRIP-MCNC: 147 MG/DL (ref 74–99)
GLUCOSE BLD MANUAL STRIP-MCNC: 187 MG/DL (ref 74–99)
GLUCOSE BLD MANUAL STRIP-MCNC: 298 MG/DL (ref 74–99)
GLUCOSE SERPL-MCNC: 157 MG/DL (ref 74–99)
GLUCOSE SERPL-MCNC: 178 MG/DL (ref 74–99)
HCT VFR BLD AUTO: 28.2 % (ref 41–52)
HGB BLD-MCNC: 9.3 G/DL (ref 13.5–17.5)
IMM GRANULOCYTES # BLD AUTO: 0.02 X10*3/UL (ref 0–0.7)
IMM GRANULOCYTES NFR BLD AUTO: 0.6 % (ref 0–0.9)
IMMUNOFIXATION COMMENT: NORMAL
LYMPHOCYTES # BLD AUTO: 0.84 X10*3/UL (ref 1.2–4.8)
LYMPHOCYTES NFR BLD AUTO: 24.8 %
M-PROTEIN 1 URINE %: 2 %
M-PROTEIN 2 URINE %: 1 %
MAGNESIUM SERPL-MCNC: 1.67 MG/DL (ref 1.6–2.4)
MCH RBC QN AUTO: 29.2 PG (ref 26–34)
MCHC RBC AUTO-ENTMCNC: 33 G/DL (ref 32–36)
MCV RBC AUTO: 88 FL (ref 80–100)
MONOCYTES # BLD AUTO: 0.38 X10*3/UL (ref 0.1–1)
MONOCYTES NFR BLD AUTO: 11.2 %
NEUTROPHILS # BLD AUTO: 2.1 X10*3/UL (ref 1.2–7.7)
NEUTROPHILS NFR BLD AUTO: 61.9 %
NRBC BLD-RTO: 0 /100 WBCS (ref 0–0)
PATH REVIEW - URINE IMMUNOFIXATION: NORMAL
PATH REVIEW-URINE PROTEIN ELECTROPHORESIS: ABNORMAL
PHOSPHATE SERPL-MCNC: 3.1 MG/DL (ref 2.5–4.9)
PHOSPHATE SERPL-MCNC: 3.2 MG/DL (ref 2.5–4.9)
PLATELET # BLD AUTO: 69 X10*3/UL (ref 150–450)
POTASSIUM SERPL-SCNC: 4 MMOL/L (ref 3.5–5.3)
POTASSIUM SERPL-SCNC: 4.4 MMOL/L (ref 3.5–5.3)
RBC # BLD AUTO: 3.19 X10*6/UL (ref 4.5–5.9)
SODIUM SERPL-SCNC: 141 MMOL/L (ref 136–145)
SODIUM SERPL-SCNC: 141 MMOL/L (ref 136–145)
TACROLIMUS BLD-MCNC: 6.5 NG/ML
URINE ELECTROPHORESIS COMMENT: ABNORMAL
WBC # BLD AUTO: 3.4 X10*3/UL (ref 4.4–11.3)

## 2023-11-20 PROCEDURE — 99233 SBSQ HOSP IP/OBS HIGH 50: CPT | Performed by: INTERNAL MEDICINE

## 2023-11-20 PROCEDURE — 80069 RENAL FUNCTION PANEL: CPT

## 2023-11-20 PROCEDURE — 82947 ASSAY GLUCOSE BLOOD QUANT: CPT

## 2023-11-20 PROCEDURE — 50200 RENAL BIOPSY PERQ: CPT | Mod: LEFT SIDE | Performed by: RADIOLOGY

## 2023-11-20 PROCEDURE — 88348 ELECTRON MICROSCOPY DX: CPT | Performed by: PATHOLOGY

## 2023-11-20 PROCEDURE — 86255 FLUORESCENT ANTIBODY SCREEN: CPT | Performed by: INTERNAL MEDICINE

## 2023-11-20 PROCEDURE — 36415 COLL VENOUS BLD VENIPUNCTURE: CPT

## 2023-11-20 PROCEDURE — 2500000004 HC RX 250 GENERAL PHARMACY W/ HCPCS (ALT 636 FOR OP/ED)

## 2023-11-20 PROCEDURE — 85025 COMPLETE CBC W/AUTO DIFF WBC: CPT

## 2023-11-20 PROCEDURE — 99152 MOD SED SAME PHYS/QHP 5/>YRS: CPT | Mod: LEFT SIDE | Performed by: RADIOLOGY

## 2023-11-20 PROCEDURE — 86036 ANCA SCREEN EACH ANTIBODY: CPT | Performed by: INTERNAL MEDICINE

## 2023-11-20 PROCEDURE — 0TB13ZX EXCISION OF LEFT KIDNEY, PERCUTANEOUS APPROACH, DIAGNOSTIC: ICD-10-PCS | Performed by: RADIOLOGY

## 2023-11-20 PROCEDURE — 76942 ECHO GUIDE FOR BIOPSY: CPT | Mod: LT

## 2023-11-20 PROCEDURE — 88313 SPECIAL STAINS GROUP 2: CPT | Performed by: PATHOLOGY

## 2023-11-20 PROCEDURE — 88346 IMFLUOR 1ST 1ANTB STAIN PX: CPT | Performed by: PATHOLOGY

## 2023-11-20 PROCEDURE — 2500000002 HC RX 250 W HCPCS SELF ADMINISTERED DRUGS (ALT 637 FOR MEDICARE OP, ALT 636 FOR OP/ED)

## 2023-11-20 PROCEDURE — 83735 ASSAY OF MAGNESIUM: CPT

## 2023-11-20 PROCEDURE — 2500000004 HC RX 250 GENERAL PHARMACY W/ HCPCS (ALT 636 FOR OP/ED): Performed by: RADIOLOGY

## 2023-11-20 PROCEDURE — 2500000005 HC RX 250 GENERAL PHARMACY W/O HCPCS

## 2023-11-20 PROCEDURE — 50200 RENAL BIOPSY PERQ: CPT | Mod: LT,GC | Performed by: RADIOLOGY

## 2023-11-20 PROCEDURE — 88350 IMFLUOR EA ADDL 1ANTB STN PX: CPT | Mod: TC,SUR | Performed by: STUDENT IN AN ORGANIZED HEALTH CARE EDUCATION/TRAINING PROGRAM

## 2023-11-20 PROCEDURE — 76942 ECHO GUIDE FOR BIOPSY: CPT | Mod: LEFT SIDE | Performed by: RADIOLOGY

## 2023-11-20 PROCEDURE — 2500000001 HC RX 250 WO HCPCS SELF ADMINISTERED DRUGS (ALT 637 FOR MEDICARE OP)

## 2023-11-20 PROCEDURE — 88350 IMFLUOR EA ADDL 1ANTB STN PX: CPT | Performed by: PATHOLOGY

## 2023-11-20 PROCEDURE — 80197 ASSAY OF TACROLIMUS: CPT

## 2023-11-20 PROCEDURE — 88305 TISSUE EXAM BY PATHOLOGIST: CPT | Performed by: PATHOLOGY

## 2023-11-20 PROCEDURE — 50200 RENAL BIOPSY PERQ: CPT | Mod: LT

## 2023-11-20 PROCEDURE — 1100000001 HC PRIVATE ROOM DAILY

## 2023-11-20 RX ORDER — MIDAZOLAM HYDROCHLORIDE 1 MG/ML
INJECTION INTRAMUSCULAR; INTRAVENOUS AS NEEDED
Status: COMPLETED | OUTPATIENT
Start: 2023-11-20 | End: 2023-11-20

## 2023-11-20 RX ORDER — FENTANYL CITRATE 50 UG/ML
INJECTION, SOLUTION INTRAMUSCULAR; INTRAVENOUS AS NEEDED
Status: COMPLETED | OUTPATIENT
Start: 2023-11-20 | End: 2023-11-20

## 2023-11-20 RX ORDER — FUROSEMIDE 10 MG/ML
80 INJECTION INTRAMUSCULAR; INTRAVENOUS ONCE
Status: COMPLETED | OUTPATIENT
Start: 2023-11-20 | End: 2023-11-20

## 2023-11-20 RX ORDER — HYDRALAZINE HYDROCHLORIDE 25 MG/1
50 TABLET, FILM COATED ORAL 3 TIMES DAILY
Status: DISCONTINUED | OUTPATIENT
Start: 2023-11-20 | End: 2023-11-22 | Stop reason: HOSPADM

## 2023-11-20 RX ADMIN — INSULIN GLARGINE 10 UNITS: 100 INJECTION, SOLUTION SUBCUTANEOUS at 09:09

## 2023-11-20 RX ADMIN — HYDRALAZINE HYDROCHLORIDE 25 MG: 25 TABLET, FILM COATED ORAL at 09:00

## 2023-11-20 RX ADMIN — LIDOCAINE 1 PATCH: 4 PATCH TOPICAL at 09:00

## 2023-11-20 RX ADMIN — METHOCARBAMOL TABLETS 500 MG: 500 TABLET, COATED ORAL at 21:14

## 2023-11-20 RX ADMIN — TACROLIMUS 2.5 MG: 0.5 CAPSULE ORAL at 18:09

## 2023-11-20 RX ADMIN — FUROSEMIDE 80 MG: 10 INJECTION, SOLUTION INTRAMUSCULAR; INTRAVENOUS at 12:13

## 2023-11-20 RX ADMIN — FERROUS SULFATE TAB 325 MG (65 MG ELEMENTAL FE) 65 MG OF IRON: 325 (65 FE) TAB at 21:14

## 2023-11-20 RX ADMIN — TACROLIMUS 2.5 MG: 0.5 CAPSULE ORAL at 06:28

## 2023-11-20 RX ADMIN — FENTANYL CITRATE 50 MCG: 50 INJECTION, SOLUTION INTRAMUSCULAR; INTRAVENOUS at 10:36

## 2023-11-20 RX ADMIN — INSULIN LISPRO 2 UNITS: 100 INJECTION, SOLUTION INTRAVENOUS; SUBCUTANEOUS at 18:09

## 2023-11-20 RX ADMIN — Medication 2000 UNITS: at 08:59

## 2023-11-20 RX ADMIN — PREDNISONE 10 MG: 5 TABLET ORAL at 09:00

## 2023-11-20 RX ADMIN — METHOCARBAMOL TABLETS 500 MG: 500 TABLET, COATED ORAL at 06:28

## 2023-11-20 RX ADMIN — CARVEDILOL 37.5 MG: 25 TABLET, FILM COATED ORAL at 08:59

## 2023-11-20 RX ADMIN — LORATADINE 10 MG: 10 TABLET ORAL at 09:00

## 2023-11-20 RX ADMIN — CARVEDILOL 37.5 MG: 25 TABLET, FILM COATED ORAL at 21:14

## 2023-11-20 RX ADMIN — FENTANYL CITRATE 50 MCG: 50 INJECTION, SOLUTION INTRAMUSCULAR; INTRAVENOUS at 10:31

## 2023-11-20 RX ADMIN — AMLODIPINE BESYLATE 10 MG: 10 TABLET ORAL at 09:00

## 2023-11-20 RX ADMIN — MIDAZOLAM HYDROCHLORIDE 1 MG: 1 INJECTION, SOLUTION INTRAMUSCULAR; INTRAVENOUS at 10:36

## 2023-11-20 RX ADMIN — CINACALCET 30 MG: 30 TABLET ORAL at 09:00

## 2023-11-20 RX ADMIN — FERROUS SULFATE TAB 325 MG (65 MG ELEMENTAL FE) 65 MG OF IRON: 325 (65 FE) TAB at 09:00

## 2023-11-20 RX ADMIN — HYDRALAZINE HYDROCHLORIDE 25 MG: 25 TABLET, FILM COATED ORAL at 14:47

## 2023-11-20 RX ADMIN — MIDAZOLAM HYDROCHLORIDE 1 MG: 1 INJECTION, SOLUTION INTRAMUSCULAR; INTRAVENOUS at 10:31

## 2023-11-20 RX ADMIN — PRAVASTATIN SODIUM 40 MG: 20 TABLET ORAL at 21:14

## 2023-11-20 RX ADMIN — HYDRALAZINE HYDROCHLORIDE 50 MG: 25 TABLET ORAL at 21:14

## 2023-11-20 RX ADMIN — OXYCODONE HYDROCHLORIDE 5 MG: 5 TABLET ORAL at 06:28

## 2023-11-20 RX ADMIN — Medication 5 MG: at 21:14

## 2023-11-20 RX ADMIN — ACETAMINOPHEN 975 MG: 325 TABLET ORAL at 08:59

## 2023-11-20 ASSESSMENT — PAIN SCALES - GENERAL
PAINLEVEL_OUTOF10: 4
PAINLEVEL_OUTOF10: 0 - NO PAIN

## 2023-11-20 ASSESSMENT — PAIN - FUNCTIONAL ASSESSMENT
PAIN_FUNCTIONAL_ASSESSMENT: 0-10
PAIN_FUNCTIONAL_ASSESSMENT: 0-10

## 2023-11-20 NOTE — CARE PLAN
The patient's goals for the shift include      The clinical goals for the shift include Pt will verbalize pain to reduce with medication this shift

## 2023-11-20 NOTE — PROGRESS NOTES
Subjective   Manolo Bashir is a 68 y.o. male on hospital day 4 without complaints.  Patient is aware of plan for kidney biopsy today.        Objective     Exam     Vitals:    11/20/23 1030 11/20/23 1035 11/20/23 1100 11/20/23 1223   BP: (!) 198/88 (!) 188/90 152/71 152/85   Pulse: 88 60 58 61   Resp: 15 12 20 18   Temp:    36.8 °C (98.2 °F)   TempSrc:       SpO2: 100% 100% 97% 96%   Weight:       Height:          Intake/Output last 3 shifts:  I/O last 3 completed shifts:  In: 600 (8 mL/kg) [P.O.:600]  Out: 700 (9.3 mL/kg) [Urine:700 (0.3 mL/kg/hr)]  Weight: 75.3 kg     Physical Exam  Vitals reviewed.   Constitutional:       Comments: Sitting at the side of the bed.   HENT:      Head: Normocephalic and atraumatic.   Cardiovascular:      Rate and Rhythm: Normal rate and regular rhythm.      Pulses: Normal pulses.      Heart sounds: No murmur heard.     No friction rub. No gallop.   Pulmonary:      Effort: Pulmonary effort is normal.      Breath sounds: Normal breath sounds. No wheezing, rhonchi or rales.   Abdominal:      General: Bowel sounds are normal. There is no distension.      Palpations: Abdomen is soft.      Tenderness: There is no abdominal tenderness. There is no guarding or rebound.   Musculoskeletal:      Right lower leg: Edema present.      Left lower leg: Edema present.      Comments: 1-2+ bilateral pitting edema lower extremities   Skin:     General: Skin is warm and dry.   Neurological:      Mental Status: He is alert.      Comments: Grossly intact   Psychiatric:         Mood and Affect: Mood normal.         Behavior: Behavior normal.            Medications   acetaminophen, 975 mg, oral, q8h  amLODIPine, 10 mg, oral, Daily  [Held by provider] azaTHIOprine, 25 mg, oral, Daily  carvedilol, 37.5 mg, oral, BID  cholecalciferol, 2,000 Units, oral, Daily  cinacalcet, 30 mg, oral, Daily  ferrous sulfate (325 mg ferrous sulfate), 65 mg of iron, oral, BID  [Held by provider] heparin (porcine), 5,000 Units,  "subcutaneous, q8h [facility]  hydrALAZINE, 25 mg, oral, TID  insulin glargine, 20 Units, subcutaneous, Daily  insulin lispro, 0-10 Units, subcutaneous, TID with meals  lidocaine, 1 patch, transdermal, Daily  loratadine, 10 mg, oral, Daily  [Held by provider] losartan, 25 mg, oral, Daily  melatonin, 5 mg, oral, Nightly  methocarbamol, 500 mg, oral, q8h ZOË  pravastatin, 40 mg, oral, Nightly  predniSONE, 10 mg, oral, q AM  tacrolimus, 2.5 mg, oral, q12h [facility]       PRN medications: dextrose 10 % in water (D10W), dextrose, diclofenac sodium, docusate sodium, glucagon, melatonin, oxyCODONE, polyethylene glycol, traMADol       Labs     All new labs reviewed:  some of the basic labs as follows -     Results from last 7 days   Lab Units 11/20/23  0642 11/19/23  0849 11/18/23  0613   WBC AUTO x10*3/uL 3.4* 3.8* 3.4*   HEMOGLOBIN g/dL 9.3* 9.9* 9.6*   HEMATOCRIT % 28.2* 29.7* 29.3*   PLATELETS AUTO x10*3/uL 69* 93* 93*   NEUTROS PCT AUTO % 61.9 58.7 63.8   LYMPHS PCT AUTO % 24.8 26.7 24.4   MONOS PCT AUTO % 11.2 11.2 9.1   EOS PCT AUTO % 1.5 2.1 1.8          Results from last 72 hours   Lab Units 11/20/23  0642 11/19/23  0849 11/18/23  0613   SODIUM mmol/L 141 142 141   POTASSIUM mmol/L 4.0 3.8 4.1   CHLORIDE mmol/L 111* 112* 111*   CO2 mmol/L 23 23 22   BUN mg/dL 49* 49* 50*   CREATININE mg/dL 3.16* 3.30* 3.42*     Results from last 72 hours   Lab Units 11/20/23  0642 11/19/23  0849 11/18/23  0613   ALBUMIN g/dL 2.5* 2.8* 2.6*     Results from last 72 hours   Lab Units 11/20/23  0642 11/19/23  0849 11/18/23  0613 11/17/23  1830   GLUCOSE mg/dL 178* 160* 175* 308*         No results found for: \"TR1\"  Lab Results   Component Value Date    URINECULTURE CANCELED 07/19/2023    BLOODCULT No growth at 4 days -  FINAL REPORT 10/19/2023    BLOODCULT No growth at 4 days -  FINAL REPORT 10/19/2023    BLOODCULT  09/19/2023     No Growth at 1 days~No Growth at 2 days~No Growth at 3 days~NO GROWTH at 4 days - FINAL REPORT    BLOODCULT  09/19/2023 "     No Growth at 1 days~No Growth at 2 days~No Growth at 3 days~NO GROWTH at 4 days - FINAL REPORT            Imaging   XR bone survey complete  Narrative: Interpreted By:  Melina Hernandez,   STUDY:  XR BONE SURVEY COMPLETE;  17 images.      INDICATION:  Signs/Symptoms:Multiple myeloma lytic lesions rule out.      COMPARISON:  None.      ACCESSION NUMBER(S):  [id number]      ORDERING CLINICIAN:  HAROON SOUZA      FINDINGS:  Skull: No suspicious osteolytic or blastic lesions.  Chest/Ribs: No suspicious osteolytic or blastic lesions.  Cervical Spine: No suspicious osteolytic or blastic lesions.  Thoracic Spine: No suspicious osteolytic or blastic lesions.  Lumbosacral Spine: No suspicious osteolytic or blastic lesions.  Pelvis: No suspicious osteolytic or blastic lesions.  Bilateral upper extremities: No suspicious osteolytic or blastic  lesions. Bilateral lower extremities: No suspicious osteolytic or  blastic lesions. Incidental findings:  Large amount of lobulated soft  tissue prominence in the radial aspect of the left distal upper arm  containing interrupted calcifications. Bilateral upper arm vascular  calcifications. Right subclavian vascular stent. Bilateral femoral  artery vascular calcifications.      Impression: 1. No evidence of suspicious osteolytic lesion in the axial or  appendicular skeleton.  2. Large amount of lobulated soft tissue prominence in the radial  aspect of the left distal upper arm containing interrupted  calcifications. This may represent fistula for hemodialysis and  clinical correlation as well as physical examination suggested.  3. Prominent vascular calcifications of the extremities      Signed by: Melina Hernandez 11/20/2023 9:50 AM  Dictation workstation:   [id number]     No results found for this or any previous visit from the past 1095 days.     Encounter Date: 11/15/23   ECG 12 lead   Result Value    Ventricular Rate 73    Atrial Rate 300    QRS Duration 138    QT Interval  422    QTC Calculation(Bazett) 464    P Axis 67    R Axis 69    T Axis 39    QRS Count 12    Q Onset 216    P Onset 135    P Offset 200    T Offset 427    QTC Fredericia 450    Narrative    Please see ED Provider Note for formal interpretation  Confirmed by Adi Wagner ([id number]) on 11/16/2023 3:45:51 PM          Assessment and Plan     MGUS/possible multiple myeloma: Bone survey fairly unremarkable  -Follow-up heme-onc recommendations    Acute on chronic renal failure: Baseline creatinine is roughly 2.5 -3.  Creatinine is downtrending now to 3.16  -Monitor renal function panel  -Monitor strict ins and outs and daily weights.  Need accurate measurement of urine output  -Avoid nephrotoxic agents.  For now avoid ACE inhibitors/ARB's, iodinated contrast, NSAIDs.  Holding losartan  -Avoid hypotension.  We will adjust vasoactive meds to try and keep maps greater than 65  -Renally dose meds when needed  -Continue tacrolimus.  Monitor daily levels in the morning.  We will adjust dosing per guidance from transplant neurology.    -Currently holding azathioprine in the setting of MGUS/possible multiple myeloma  -Continue prednisone  -Continue Cinacalcet and cholecalciferol  -Follow-up nephrology transplant recommendations.  Input greatly appreciated  -Checking renal biopsy today.  Will monitor post biopsy hemoglobin    Cardiovascular: Blood pressure has been running elevated  -Continue amlodipine and carvedilol  -Continue hydralazine.  May need to titrate up to 25 mg 3 times daily  -Holding losartan  -Can discuss with nephrology starting Lasix for worsening bilateral lower extremity edema  -Continue statin    Diabetes mellitus: Goal blood glucose 140-180 while inpatient.  Hemoglobin A1c is 6.0.  Over the last 24 hours patient required 10 units of sliding scale insulin  -Continue Lantus.  We will titrate per sliding scale insulin needs.  Patient had half dose today while n.p.o.  -Continue sliding scale insulin    Anemia and  thrombocytopenia: Appears somewhat chronic although this is lower than usual  -Monitor CBC  -Check peripheral smear and DIC labs if platelets continue to drop  -Continue iron    DVT prophylaxis    Physical therapy/Occupational Therapy when appropriate    Daniel Peña MD     Of note the above was done with Dragon dictation system.  Note was proofread to minimize errors.

## 2023-11-20 NOTE — CARE PLAN
The patient's goals for the shift include      The clinical goals for the shift include Pt will verbalize pain to reduce with medication this shift    Over the shift, the patient's pain remained controled.

## 2023-11-20 NOTE — POST-PROCEDURE NOTE
Interventional Radiology Brief Postprocedure Note    Attending: Lou Rodgers MD    Assistant: Kei Lemons MD    Diagnosis: Renal transplant    Description of procedure: Successful ultrasound-guided biopsy of left iliac fossa renal transplant. Please see PACS report for full details.     Anesthesia:  MAC    Complications: None    Estimated Blood Loss: minimal    Medications  As of 11/20/23 1102      hydrALAZINE (Apresoline) tablet 25 mg (mg) Total dose:  50 mg Dosing weight:  75.3      Date/Time Rate/Dose/Volume Action       11/15/23  2349 25 mg Given     11/16/23  1808 25 mg Given               hydrALAZINE (Apresoline) tablet 25 mg (mg) Total dose:  325 mg      Date/Time Rate/Dose/Volume Action       11/16/23  0943 25 mg Given      1457 25 mg Given      2132 25 mg Given     11/17/23  0929 25 mg Given      1451 25 mg Given      2122 25 mg Given     11/18/23  0836 25 mg Given      1442 25 mg Given      2023 25 mg Given     11/19/23  0855 25 mg Given      1516 25 mg Given      2328 25 mg Given     11/20/23  0900 25 mg Given               acetaminophen (Tylenol) tablet 650 mg (mg) Total dose:  650 mg Dosing weight:  75.3      Date/Time Rate/Dose/Volume Action       11/15/23  2349 650 mg Given               acetaminophen (Tylenol) tablet 975 mg (mg) Total dose:  8,775 mg*   *Administration not included in total     Date/Time Rate/Dose/Volume Action       11/16/23  0740 *975 mg Missed      1540 *975 mg Missed      2340 975 mg Given     11/17/23  0616 975 mg Given      1451 975 mg Given      2350 975 mg Given     11/18/23  0835 975 mg Given      1442 975 mg Given      2340 975 mg Given     11/19/23  0740 *975 mg Missed      1540 *975 mg Missed      2328 975 mg Given     11/20/23  0859 975 mg Given               tacrolimus (Prograf) capsule 2.5 mg (mg) Total volume:  Not documented* Dosing weight:  75.3   *Total volume has not been documented. View each administration to see the amount administered.     Date/Time  Rate/Dose/Volume Action       11/15/23  2352 2.5 mg Given     11/16/23  1202 2.5 mg Given      1833 2.5 mg Given     11/17/23  0615 2.5 mg Given      1809 2.5 mg Given     11/18/23  0604 2.5 mg Given      1832 2.5 mg Given     11/19/23  0616 2.5 mg Given      1823 2.5 mg Given     11/20/23  0628 2.5 mg Given               carvedilol (Coreg) tablet 37.5 mg (mg) Total volume:  Not documented* Dosing weight:  75.3   *Total volume has not been documented. View each administration to see the amount administered.     Date/Time Rate/Dose/Volume Action       11/16/23  2132 37.5 mg Given     11/17/23 0929 37.5 mg Given      2122 37.5 mg Given     11/18/23  0836 37.5 mg Given      2022 37.5 mg Given     11/19/23  0856 37.5 mg Given      2328 37.5 mg Given     11/20/23  0859 37.5 mg Given               hydrALAZINE (Apresoline) injection 20 mg (mg) Total dose:  20 mg Dosing weight:  75.3      Date/Time Rate/Dose/Volume Action       11/16/23  0311 20 mg Given               carvedilol (Coreg) tablet 25 mg (mg) Total dose:  25 mg Dosing weight:  75.3      Date/Time Rate/Dose/Volume Action       11/16/23  0944 25 mg Given               amLODIPine (Norvasc) tablet 10 mg (mg) Total dose:  50 mg Dosing weight:  75.3      Date/Time Rate/Dose/Volume Action       11/16/23  0945 10 mg Given     11/17/23  0929 10 mg Given     11/18/23  0835 10 mg Given     11/19/23  0856 10 mg Given     11/20/23  0900 10 mg Given               labetaloL (Normodyne,Trandate) injection  - Omnicell Override Pull Total dose:  Cannot be calculated*   *Administration dose not documented     Date/Time Rate/Dose/Volume Action       11/16/23 0310 *Not included in total Missed               HYDROmorphone (Dilaudid) injection 0.5 mg (mg) Total dose:  0.5 mg Dosing weight:  75.3      Date/Time Rate/Dose/Volume Action       11/16/23  0500 0.5 mg Given               cefTRIAXone (Rocephin) IVPB 1 g (mL/hr) Total volume:  Not documented* Dosing weight:  75.3   *Total  volume has not been documented. View each administration to see the amount administered.     Date/Time Rate/Dose/Volume Action       11/16/23  0500 1 g - 100 mL/hr (over 30 min) New Bag      0530  (over 30 min) Stopped     11/17/23  1244 1 g - 100 mL/hr (over 30 min) New Bag      1314  (over 30 min) Stopped     11/18/23  0907 1 g - 100 mL/hr (over 30 min) New Bag      0937  (over 30 min) Stopped               cinacalcet (Sensipar) tablet 30 mg (mg) Total dose:  150 mg Dosing weight:  75.3      Date/Time Rate/Dose/Volume Action       11/16/23  0945 30 mg Given     11/17/23  0929 30 mg Given     11/18/23  0835 30 mg Given     11/19/23  0854 30 mg Given     11/20/23 0900 30 mg Given               docusate sodium (Colace) capsule 100 mg (mg) Total dose:  0 mg*   *Administration not included in total     Date/Time Rate/Dose/Volume Action       11/18/23  0754 *100 mg Missed     11/20/23  0750 *100 mg Missed               ferrous sulfate (325 mg ferrous sulfate) tablet 65 mg of iron (mg of iron) Total dose:  585 mg of iron      Date/Time Rate/Dose/Volume Action       11/16/23  0942 65 mg of iron Given      2132 65 mg of iron Given     11/17/23 0929 65 mg of iron Given      2122 65 mg of iron Given     11/18/23  0835 65 mg of iron Given      2023 65 mg of iron Given     11/19/23  0855 65 mg of iron Given      2327 65 mg of iron Given     11/20/23 0900 65 mg of iron Given               gabapentin (Neurontin) capsule 300 mg (mg) Total dose:  0 mg* Dosing weight:  75.3   *Administration not included in total     Date/Time Rate/Dose/Volume Action       11/16/23 0900 *300 mg Missed               insulin glargine (Lantus) injection 12 Units (Units) Total dose:  24 Units Dosing weight:  75.3      Date/Time Rate/Dose/Volume Action       11/16/23 0918 12 Units Given     11/17/23  0945 12 Units Given               insulin glargine (Lantus) injection 20 Units (Units) Total dose:  50 Units Dosing weight:  75.3      Date/Time  Rate/Dose/Volume Action       11/18/23  0950 20 Units Given     11/19/23  0858 20 Units Given     11/20/23  0909 10 Units Given               losartan (Cozaar) tablet 25 mg (mg) Total dose:  Cannot be calculated*   *Administration dose not documented     Date/Time Rate/Dose/Volume Action       11/16/23  0843 *Not included in total Held by provider      0900 *Not included in total Automatically Held     11/17/23 0900 *25 mg Missed     11/18/23 0900 *Not included in total Automatically Held     11/19/23 0900 *Not included in total Automatically Held     11/20/23 0900 *25 mg Missed               pravastatin (Pravachol) tablet 40 mg (mg) Total dose:  160 mg      Date/Time Rate/Dose/Volume Action       11/16/23  2132 40 mg Given     11/17/23  2122 40 mg Given     11/18/23 2023 40 mg Given     11/19/23 2328 40 mg Given               predniSONE (Deltasone) tablet 10 mg (mg) Total dose:  50 mg      Date/Time Rate/Dose/Volume Action       11/16/23  0944 10 mg Given     11/17/23  0928 10 mg Given     11/18/23  0835 10 mg Given     11/19/23  0857 10 mg Given     11/20/23  0900 10 mg Given               cholecalciferol (Vitamin D-3) tablet 2,000 Units (Units) Total dose:  10,000 Units      Date/Time Rate/Dose/Volume Action       11/16/23  0942 2,000 Units Given     11/17/23  0929 2,000 Units Given     11/18/23  0835 2,000 Units Given     11/19/23  0855 2,000 Units Given     11/20/23  0859 2,000 Units Given               azaTHIOprine (Imuran) tablet 25 mg (mg) Total dose:  Cannot be calculated*   *Administration dose not documented     Date/Time Rate/Dose/Volume Action       11/16/23  0736 *Not included in total Held by provider      0900 *Not included in total Automatically Held      1540 *Not included in total Unheld by provider      1803 *Not included in total Held by provider     11/17/23 0900 *25 mg Missed     11/18/23 0900 *Not included in total Automatically Held     11/19/23 0900 *Not included in total  Automatically Held     11/20/23  0900 *25 mg Missed               loratadine (Claritin) tablet 10 mg (mg) Total dose:  50 mg Dosing weight:  75.3      Date/Time Rate/Dose/Volume Action       11/16/23  0942 10 mg Given     11/17/23  0928 10 mg Given     11/18/23  0835 10 mg Given     11/19/23  0857 10 mg Given     11/20/23  0900 10 mg Given               polyethylene glycol (Glycolax, Miralax) packet 17 g (g) Total dose:  0 g* Dosing weight:  75.3   *Administration not included in total     Date/Time Rate/Dose/Volume Action       11/18/23  0754 *17 g Missed     11/20/23  0753 *17 g Missed               insulin lispro (HumaLOG) injection 0-10 Units (Units) Total dose:  38 Units Dosing weight:  75.3      Date/Time Rate/Dose/Volume Action       11/16/23  0918 6 Units Given      1202 2 Units Given      1700 *Not included in total Missed     11/17/23  0945 2 Units Given      1306 2 Units Given      1810 6 Units Given     11/18/23  0948 2 Units Given      1441 4 Units Given      1749 4 Units Given     11/19/23  0859 2 Units Given      1335 2 Units Given      1750 6 Units Given     11/20/23  0800 *Not included in total Missed               furosemide (Lasix) injection 40 mg (mg) Total dose:  40 mg* Dosing weight:  75.3   *Administration not included in total     Date/Time Rate/Dose/Volume Action       11/16/23  0750 *40 mg Missed     11/17/23  1038 40 mg Given               furosemide (Lasix) injection 80 mg (mg) Total dose:  80 mg Dosing weight:  75.3      Date/Time Rate/Dose/Volume Action       11/18/23  1622 80 mg Given               furosemide (Lasix) injection 80 mg (mg) Total dose:  80 mg Dosing weight:  75.3      Date/Time Rate/Dose/Volume Action       11/19/23  1009 80 mg Given               lidocaine 4 % patch 1 patch (patch) Total dose:  5 patch Dosing weight:  75.3      Date/Time Rate/Dose/Volume Action       11/16/23  0951 1 patch (over 720 min) Medication Applied      2151  (over 720 min) Medication Removed      11/17/23  0929 1 patch (over 720 min) Medication Applied      2129  (over 720 min) Medication Removed     11/18/23  0836 1 patch (over 720 min) Medication Applied      2036  (over 720 min) Medication Removed     11/19/23  0857 1 patch (over 720 min) Medication Applied      2057  (over 720 min) Medication Removed     11/20/23  0900 1 patch (over 720 min) Medication Applied               sodium zirconium cyclosilicate (Lokelma) packet 10 g (g) Total dose:  40 g Dosing weight:  75.3      Date/Time Rate/Dose/Volume Action       11/16/23  1720 10 g Given      2131 10 g Given     11/17/23  0931 10 g Given      1451 10 g Given               traMADol (Ultram) tablet 25 mg (mg) Total dose:  25 mg Dosing weight:  75.3      Date/Time Rate/Dose/Volume Action       11/17/23  1039 25 mg Given               melatonin tablet 5 mg (mg) Total dose:  15 mg* Dosing weight:  75.3   *Administration not included in total     Date/Time Rate/Dose/Volume Action       11/17/23  2122 5 mg Given     11/18/23  2023 5 mg Given     11/19/23  2100 *5 mg Missed      2327 5 mg Given               methocarbamol (Robaxin) tablet 500 mg (mg) Total dose:  4,500 mg Dosing weight:  75.3      Date/Time Rate/Dose/Volume Action       11/17/23  1451 500 mg Given      2122 500 mg Given     11/18/23  0604 500 mg Given      1442 500 mg Given      2200 500 mg Given     11/19/23  0616 500 mg Given      1337 500 mg Given      2329 500 mg Given     11/20/23  0628 500 mg Given               furosemide (Lasix) tablet 80 mg (mg) Total dose:  0 mg* Dosing weight:  75.3   *Administration not included in total     Date/Time Rate/Dose/Volume Action       11/18/23  1500 *80 mg Missed               oxyCODONE (Roxicodone) immediate release tablet 5 mg (mg) Total dose:  20 mg Dosing weight:  75.3      Date/Time Rate/Dose/Volume Action       11/19/23  1015 5 mg Given      1747 5 mg Given      2329 5 mg Given     11/20/23  0628 5 mg Given               heparin (porcine) injection  5,000 Units (Units) Total dose:  Cannot be calculated* Dosing weight:  75.3   *Administration dose not documented     Date/Time Rate/Dose/Volume Action       11/19/23  0930 *5,000 Units Missed      1400 *5,000 Units Missed      2200 *5,000 Units Missed     11/20/23  0600 *5,000 Units Missed      0700 *Not included in total Held by provider               fentaNYL PF (Sublimaze) injection (mcg) Total dose:  100 mcg      Date/Time Rate/Dose/Volume Action       11/20/23  1031 50 mcg Given      1036 50 mcg Given               midazolam (Versed) injection (mg) Total dose:  2 mg      Date/Time Rate/Dose/Volume Action       11/20/23  1031 1 mg Given      1036 1 mg Given                   No specimens collected      See detailed result report with images in PACS.    The patient tolerated the procedure well without incident or complication and is in stable condition.

## 2023-11-20 NOTE — PROGRESS NOTES
"        INPATIENT TRANSPLANT NEPHROLOGY PROGRESS NOTE          REASON FOR CONSULT:  Immunosuppressive medication management and nephrology related issues.    SUBJECTION:     He had a kidney transplant biopsy this morning. No hematoma or hematuria at this time.     No acute event overnight.    PHYCISCAL EXAMINATION:    Visit Vitals  /76   Pulse 61   Temp 36.8 °C (98.2 °F)   Resp 18   Ht 1.727 m (5' 8\")   Wt 75.3 kg (166 lb)   SpO2 96%   BMI 25.24 kg/m²   Smoking Status Never   BSA 1.9 m²        11/18 1900 - 11/20 0659  In: 600 [P.O.:600]  Out: 700 [Urine:700]     Weight change:     General Appearance - NAD, Good speech, oriented and alert  HEENT - Supple. Not pale. No jaundice. No cervical lymphadenopathy. Pharynx and tonsils are not injected.  CVS - RRR. Normal S1/S2. No murmur, click , rub or gallop  Lungs- clear to auscultation bilaterally  Abdomen - soft , not tender, no guarding, no rigidity. No hepatosplenomegaly. Normal bowel sounds. No masses and ascites. S/P Kidney transplant .  Transplanted kidney is not tender.   Musculoskeletal /Extremities - no edema. Full ROM. No joint tenderness.   Neuro/Psych - appropriate mood and affect. Motor power V/V all extremities. CN I -XII were grossly intact.  Skin - No visible rash    MEDICATION LIST:  acetaminophen, 975 mg, q8h  amLODIPine, 10 mg, Daily  [Held by provider] azaTHIOprine, 25 mg, Daily  carvedilol, 37.5 mg, BID  cholecalciferol, 2,000 Units, Daily  cinacalcet, 30 mg, Daily  ferrous sulfate (325 mg ferrous sulfate), 65 mg of iron, BID  [Held by provider] heparin (porcine), 5,000 Units, q8h ZOË  hydrALAZINE, 25 mg, TID  insulin glargine, 20 Units, Daily  insulin lispro, 0-10 Units, TID with meals  lidocaine, 1 patch, Daily  loratadine, 10 mg, Daily  [Held by provider] losartan, 25 mg, Daily  melatonin, 5 mg, Nightly  methocarbamol, 500 mg, q8h ZOË  pravastatin, 40 mg, Nightly  predniSONE, 10 mg, q AM  tacrolimus, 2.5 mg, q12h ZOË         dextrose 10 % in " water (D10W), 0.3 g/kg/hr, Once PRN  dextrose, 25 g, q15 min PRN  diclofenac sodium, 4 g, 4x daily PRN  docusate sodium, 100 mg, BID PRN  glucagon, 1 mg, q15 min PRN  melatonin, 5 mg, Nightly PRN  oxyCODONE, 5 mg, q6h PRN  polyethylene glycol, 17 g, Daily PRN  traMADol, 50 mg, q12h PRN        ALLERGY:  No Known Allergies    LABS:  Results for orders placed or performed during the hospital encounter of 11/15/23 (from the past 24 hour(s))   POCT GLUCOSE   Result Value Ref Range    POCT Glucose 281 (H) 74 - 99 mg/dL   CBC and Auto Differential   Result Value Ref Range    WBC 3.4 (L) 4.4 - 11.3 x10*3/uL    nRBC 0.0 0.0 - 0.0 /100 WBCs    RBC 3.19 (L) 4.50 - 5.90 x10*6/uL    Hemoglobin 9.3 (L) 13.5 - 17.5 g/dL    Hematocrit 28.2 (L) 41.0 - 52.0 %    MCV 88 80 - 100 fL    MCH 29.2 26.0 - 34.0 pg    MCHC 33.0 32.0 - 36.0 g/dL    RDW 15.7 (H) 11.5 - 14.5 %    Platelets 69 (L) 150 - 450 x10*3/uL    Neutrophils % 61.9 40.0 - 80.0 %    Immature Granulocytes %, Automated 0.6 0.0 - 0.9 %    Lymphocytes % 24.8 13.0 - 44.0 %    Monocytes % 11.2 2.0 - 10.0 %    Eosinophils % 1.5 0.0 - 6.0 %    Basophils % 0.0 0.0 - 2.0 %    Neutrophils Absolute 2.10 1.20 - 7.70 x10*3/uL    Immature Granulocytes Absolute, Automated 0.02 0.00 - 0.70 x10*3/uL    Lymphocytes Absolute 0.84 (L) 1.20 - 4.80 x10*3/uL    Monocytes Absolute 0.38 0.10 - 1.00 x10*3/uL    Eosinophils Absolute 0.05 0.00 - 0.70 x10*3/uL    Basophils Absolute 0.00 0.00 - 0.10 x10*3/uL   Renal Function Panel   Result Value Ref Range    Glucose 178 (H) 74 - 99 mg/dL    Sodium 141 136 - 145 mmol/L    Potassium 4.0 3.5 - 5.3 mmol/L    Chloride 111 (H) 98 - 107 mmol/L    Bicarbonate 23 21 - 32 mmol/L    Anion Gap 11 10 - 20 mmol/L    Urea Nitrogen 49 (H) 6 - 23 mg/dL    Creatinine 3.16 (H) 0.50 - 1.30 mg/dL    eGFR 21 (L) >60 mL/min/1.73m*2    Calcium 8.1 (L) 8.6 - 10.6 mg/dL    Phosphorus 3.1 2.5 - 4.9 mg/dL    Albumin 2.5 (L) 3.4 - 5.0 g/dL   Magnesium   Result Value Ref Range     Magnesium 1.67 1.60 - 2.40 mg/dL   Tacrolimus level   Result Value Ref Range    Tacrolimus  6.5 <=15.0 ng/mL   POCT GLUCOSE   Result Value Ref Range    POCT Glucose 145 (H) 74 - 99 mg/dL   POCT GLUCOSE   Result Value Ref Range    POCT Glucose 143 (H) 74 - 99 mg/dL   POCT GLUCOSE   Result Value Ref Range    POCT Glucose 147 (H) 74 - 99 mg/dL        ASSESSMENT AND PLAN:    Mr. Bashir is a 68 y.o. male who underwent a kidney transplant surgery  on 7/13/2013 (Kidney), 3/26/1994 (Kidney). He also has hx of  DM2, PRCA s/p rdical prostatectomy and HCV. Mr. Bashir presented to the ED on 11/15/2023 with chief complaint of difficulty urinating for the last few days along with increased swelling in the lower extremities . Noted slightly elevated Creatinine upon admission.     Transplant nephrology is consulted to assist with immunosuppressive medication management and nephrology related issues.      Principal Problem:    Pyelonephritis      1. ESRD S/P Kidney transplant.   - Renal allograft function:   Lab Results   Component Value Date    CREATININE 3.16 (H) 11/20/2023     Serum creatinine: 3.16 mg/dL (H) 11/20/23 0642  Estimated creatinine clearance: 21.9 mL/min (A)    Intake/Output Summary (Last 24 hours) at 11/20/2023 1459  Last data filed at 11/20/2023 0800  Gross per 24 hour   Intake --   Output 500 ml   Net -500 ml     - Follow up path from biopsy this am.  - No indication for dialysis at this moment.  - Baseline creatinine had been 2.5-3 mike  - Continue to monitor UOP and Serum creatinine closely.   - Avoid nephrotoxic agents, NSAIDs and IV contrast   - Strict I/O.   - Renally dose all medications by the most recent CrCl from Cockcroft-Gault formula.  - UPC ratio 2.1 (A1C 6%)    2. Immunosuppression   - continue current immunosuppression   - Monitor tacrolimus trough level   -Last tacrolimus level was 6.5; at goal.  -He is on only tacrolimus and prednisone. Per record, AZA was stopped due to concerns about MGUS/Myeloma.      3. 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     4. Electrolyte   Lab Results   Component Value Date    GLUCOSE 178 (H) 11/20/2023    CALCIUM 8.1 (L) 11/20/2023     11/20/2023    K 4.0 11/20/2023    CO2 23 11/20/2023     (H) 11/20/2023    BUN 49 (H) 11/20/2023    CREATININE 3.16 (H) 11/20/2023     - Reviewed renal profile.     6. Hypertension   Blood Pressures         11/20/2023  1030 11/20/2023  1035 11/20/2023  1100 11/20/2023  1223 11/20/2023  1443    BP: 198/88 188/90 152/71 152/85 157/76          -Goal BP < 140/90 mmHg   -continue current management   - Consider increasing hydralazine to 50 mg tid. May go up to 100 mg tid if needed.     7.  GI prophylaxis   - On PPI     8. DVT Prophylaxis  -Defer to primary team    * Case was discussed with primary team.  For questions, please contact transplant nephrology page x [phone]    Marion Bridges    Transplant Nephrologist

## 2023-11-20 NOTE — PROGRESS NOTES
"Manolo Bashir is a 68 y.o. male on day 4 of admission presenting with Pyelonephritis.    Subjective   Pt seen and examined this morning. Pt sitting up in bed. Pts pain in back is subjectively much improved. Pt made around 600 ml urine in the last 24 hrs. No fevers, chills, HA, N/V/D, abdominal pain, or urinary sx.       Objective   Last Recorded Vitals  Blood pressure 166/84, pulse 88, temperature 37.6 °C (99.7 °F), resp. rate 18, height 1.727 m (5' 8\"), weight 75.3 kg (166 lb), SpO2 99 %.    Physical Exam  Vitals reviewed.   Constitutional:       General: He is not in acute distress.     Appearance: Normal appearance. He is normal weight.   HENT:      Head: Normocephalic and atraumatic.      Right Ear: Tympanic membrane normal.      Left Ear: Tympanic membrane normal.      Nose: Nose normal.      Mouth/Throat:      Mouth: Mucous membranes are moist.      Pharynx: Oropharynx is clear.   Eyes:      Extraocular Movements: Extraocular movements intact.      Conjunctiva/sclera: Conjunctivae normal.      Pupils: Pupils are equal, round, and reactive to light.   Cardiovascular:      Rate and Rhythm: Normal rate and regular rhythm.      Pulses: Normal pulses.      Heart sounds: Normal heart sounds. No murmur heard.     No friction rub. No gallop.   Pulmonary:      Effort: Pulmonary effort is normal. No respiratory distress.      Breath sounds: Normal breath sounds. No wheezing, rhonchi or rales.   Chest:      Chest wall: No tenderness.   Abdominal:      General: Abdomen is flat. Bowel sounds are normal. There is distension.      Palpations: Abdomen is soft. There is no mass.      Tenderness: There is no abdominal tenderness. There is no right CVA tenderness, left CVA tenderness, guarding or rebound.      Hernia: No hernia is present.      Comments: Distended, nontender, soft   Musculoskeletal:         General: Normal range of motion.      Right lower leg: Edema present.      Left lower leg: Edema present.      Comments: R " sided mid thoracic tenderness under R posterior scapula   Skin:     General: Skin is warm and dry.      Capillary Refill: Capillary refill takes less than 2 seconds.   Neurological:      General: No focal deficit present.      Mental Status: He is alert and oriented to person, place, and time. Mental status is at baseline.   Psychiatric:         Mood and Affect: Mood normal.         Behavior: Behavior normal.         Thought Content: Thought content normal.         Judgment: Judgment normal.       Intake/Output last 3 Shifts:  I/O last 3 completed shifts:  In: 600 (8 mL/kg) [P.O.:600]  Out: 700 (9.3 mL/kg) [Urine:700 (0.3 mL/kg/hr)]  Weight: 75.3 kg     Relevant Results  Results from last 7 days   Lab Units 11/20/23  0642 11/19/23  0849 11/18/23  0613   WBC AUTO x10*3/uL 3.4* 3.8* 3.4*   HEMOGLOBIN g/dL 9.3* 9.9* 9.6*   HEMATOCRIT % 28.2* 29.7* 29.3*   PLATELETS AUTO x10*3/uL 69* 93* 93*          Results from last 7 days   Lab Units 11/20/23  0642 11/19/23  0849 11/18/23  0613 11/16/23  1411 11/15/23  1413   SODIUM mmol/L 141 142 141   < > 144   POTASSIUM mmol/L 4.0 3.8 4.1   < > 5.5*   CHLORIDE mmol/L 111* 112* 111*   < > 114*   CO2 mmol/L 23 23 22   < > 24   BUN mg/dL 49* 49* 50*   < > 43*   CREATININE mg/dL 3.16* 3.30* 3.42*   < > 3.42*   CALCIUM mg/dL 8.1* 8.7 8.4*   < > 8.8   PROTEIN TOTAL g/dL  --   --   --   --  5.5*   BILIRUBIN TOTAL mg/dL  --   --   --   --  0.4   ALK PHOS U/L  --   --   --   --  74   ALT U/L  --   --   --   --  24   AST U/L  --   --   --   --  14   GLUCOSE mg/dL 178* 160* 175*   < > 220*    < > = values in this interval not displayed.             Assessment/Plan   Principal Problem:    Pyelonephritis    Manolo JULIANNA Bashir is a 68 y.o. male with a PMHx of ESRD s/p renal transplant (1992, 2013 (left kidney)), HTN, TIIDM, and prior HCV presenting with several days of anuria, dysuria, 9/10 sharp back pain radiating to abdomen, and LE swelling. Differential diagnosis includes pyelonephritis vs.  Plasma cell disease leading to renal insult. Etiology of volume overload unclear. Lack of significant WBC increase, negative UA, and lack of urinary symptoms make pyelonephritis less likely. FeNa 1.3% suggesting intrinsic renal pathology. Pt previously with anuria and now making urine on admission. Presentation likely due to renal insult from MGUS vs multiple myeloma    Updates 11/20:  - Biopsy completed  - Skeletal survey showing no osteolytic lesions  - Diurese with 80 Lasix today  - Fu heme onc recs  - Fu nephrology recs     #ISIAH on CKD3 (Scr 3.42, baseline 2.8)  #S/p renal transplant (1992, 2013)  ::POCUS ruled out hydronephrosis  ::CTA/P showing perinephric fat stranding and anasarca  ::FeNa 1.3%  -Strict I/Os  -Nephrology consulted, appreciate recs  -Consider renal bx on 11/20, follow up plan with nephrology  -Continue home prednisone 10 mg  -Continue home tacrolimus 2.5 mg  -Continue home cinacalcet 30 mg  -Continue home cholecalciferol  -Hold home losartan  -Hold home azathioprine    #Possible pyelonephritis - now low suspicion   -s/p ceftriaxone 11/16 - 11/18  -UA negative - no reflex to cx  -Trend WBC     #HTN  #HLD  - increase home carvedilol from 25mg BID to 37.5mg BID  - Continue home hydralazine 25 mg TID  - Continue home amlodipine 10 mg daily  - Adequate pain control for HTN  - Hold home losartan      #Back pain  ::MRI 09/19/2023 showing nonspecific focus of abnormal signal in R pedicle of L4 c/w osseous hemangioma, stress fracture, or osteoid osteoma. No discitis or osteomyelitis. No evidence of metastasis.  - Oxy 5 mg q6h PRN started for severe pain  - Skeletal survey to r/o lytic lesions  - Tylenol 975 mg q8h   - Lidocaine patch  - Tramadol 50mg q12 prn  - methocarbamol 500mg TID       #TIIDM  - increase Insulin glargine from 12 U daily to 20 U daily (home dose is 24U daily)  - Moderate SSI     F: PRN  E: PRN  N; Low potassium  Ppx: SQH     Full Code  NOK: Amalia Enriquez, significant other -  [phone]     Guillermo Oneal MD  PGY-1  [facility] [id number]

## 2023-11-21 ENCOUNTER — APPOINTMENT (OUTPATIENT)
Dept: GERIATRIC MEDICINE | Facility: CLINIC | Age: 68
End: 2023-11-21
Payer: COMMERCIAL

## 2023-11-21 LAB
ALBUMIN MFR UR ELPH: 77.6 %
ALBUMIN SERPL BCP-MCNC: 2.5 G/DL (ref 3.4–5)
ALBUMIN SERPL BCP-MCNC: 3 G/DL (ref 3.4–5)
ALPHA1 GLOB MFR UR ELPH: 1.3 %
ALPHA2 GLOB MFR UR ELPH: 4.3 %
ANION GAP SERPL CALC-SCNC: 11 MMOL/L (ref 10–20)
ANION GAP SERPL CALC-SCNC: 12 MMOL/L (ref 10–20)
B-GLOBULIN MFR UR ELPH: 8.8 %
BASOPHILS # BLD AUTO: 0 X10*3/UL (ref 0–0.1)
BASOPHILS NFR BLD AUTO: 0 %
BUN SERPL-MCNC: 47 MG/DL (ref 6–23)
BUN SERPL-MCNC: 48 MG/DL (ref 6–23)
C3 SERPL-MCNC: 61 MG/DL (ref 87–200)
C4 SERPL-MCNC: 23 MG/DL (ref 10–50)
CALCIUM SERPL-MCNC: 8 MG/DL (ref 8.6–10.6)
CALCIUM SERPL-MCNC: 8.4 MG/DL (ref 8.6–10.6)
CHLORIDE SERPL-SCNC: 107 MMOL/L (ref 98–107)
CHLORIDE SERPL-SCNC: 109 MMOL/L (ref 98–107)
CO2 SERPL-SCNC: 23 MMOL/L (ref 21–32)
CO2 SERPL-SCNC: 26 MMOL/L (ref 21–32)
CREAT SERPL-MCNC: 3.11 MG/DL (ref 0.5–1.3)
CREAT SERPL-MCNC: 3.14 MG/DL (ref 0.5–1.3)
EOSINOPHIL # BLD AUTO: 0.04 X10*3/UL (ref 0–0.7)
EOSINOPHIL NFR BLD AUTO: 1.2 %
ERYTHROCYTE [DISTWIDTH] IN BLOOD BY AUTOMATED COUNT: 15.5 % (ref 11.5–14.5)
GAMMA GLOB MFR UR ELPH: 8 %
GFR SERPL CREATININE-BSD FRML MDRD: 21 ML/MIN/1.73M*2
GFR SERPL CREATININE-BSD FRML MDRD: 21 ML/MIN/1.73M*2
GLUCOSE BLD MANUAL STRIP-MCNC: 159 MG/DL (ref 74–99)
GLUCOSE BLD MANUAL STRIP-MCNC: 180 MG/DL (ref 74–99)
GLUCOSE BLD MANUAL STRIP-MCNC: 187 MG/DL (ref 74–99)
GLUCOSE SERPL-MCNC: 170 MG/DL (ref 74–99)
GLUCOSE SERPL-MCNC: 238 MG/DL (ref 74–99)
HCT VFR BLD AUTO: 27.2 % (ref 41–52)
HGB BLD-MCNC: 9.1 G/DL (ref 13.5–17.5)
HLA RESULTS: NORMAL
HLA-A+B+C AB NFR SER: NORMAL %
HLA-DP+DQ+DR AB NFR SER: NORMAL %
IMM GRANULOCYTES # BLD AUTO: 0.02 X10*3/UL (ref 0–0.7)
IMM GRANULOCYTES NFR BLD AUTO: 0.6 % (ref 0–0.9)
IMMUNOFIXATION COMMENT: ABNORMAL
LDH SERPL L TO P-CCNC: 209 U/L (ref 84–246)
LYMPHOCYTES # BLD AUTO: 0.77 X10*3/UL (ref 1.2–4.8)
LYMPHOCYTES NFR BLD AUTO: 23.5 %
M-PROTEIN 1 URINE %: 1.3 %
M-PROTEIN 1 URINE PER 24HR: ABNORMAL
M-PROTEIN 2 URINE %: 0.8 %
M-PROTEIN 2 URINE PER 24HR: ABNORMAL
MAGNESIUM SERPL-MCNC: 1.69 MG/DL (ref 1.6–2.4)
MCH RBC QN AUTO: 29 PG (ref 26–34)
MCHC RBC AUTO-ENTMCNC: 33.5 G/DL (ref 32–36)
MCV RBC AUTO: 87 FL (ref 80–100)
MONOCYTES # BLD AUTO: 0.33 X10*3/UL (ref 0.1–1)
MONOCYTES NFR BLD AUTO: 10.1 %
NEUTROPHILS # BLD AUTO: 2.12 X10*3/UL (ref 1.2–7.7)
NEUTROPHILS NFR BLD AUTO: 64.6 %
NRBC BLD-RTO: 0 /100 WBCS (ref 0–0)
PATH REVIEW - URINE IMMUNOFIXATION: ABNORMAL
PATH REVIEW-URINE PROTEIN ELECTROPHORESIS: ABNORMAL
PHOSPHATE SERPL-MCNC: 2.7 MG/DL (ref 2.5–4.9)
PHOSPHATE SERPL-MCNC: 3.1 MG/DL (ref 2.5–4.9)
PLATELET # BLD AUTO: 65 X10*3/UL (ref 150–450)
POTASSIUM SERPL-SCNC: 4 MMOL/L (ref 3.5–5.3)
POTASSIUM SERPL-SCNC: 4.2 MMOL/L (ref 3.5–5.3)
RBC # BLD AUTO: 3.14 X10*6/UL (ref 4.5–5.9)
SODIUM SERPL-SCNC: 140 MMOL/L (ref 136–145)
SODIUM SERPL-SCNC: 140 MMOL/L (ref 136–145)
TACROLIMUS BLD-MCNC: 8.7 NG/ML
URINE ELECTROPHORESIS COMMENT: ABNORMAL
WBC # BLD AUTO: 3.3 X10*3/UL (ref 4.4–11.3)

## 2023-11-21 PROCEDURE — 85025 COMPLETE CBC W/AUTO DIFF WBC: CPT

## 2023-11-21 PROCEDURE — 87522 HEPATITIS C REVRS TRNSCRPJ: CPT | Performed by: INTERNAL MEDICINE

## 2023-11-21 PROCEDURE — 2500000004 HC RX 250 GENERAL PHARMACY W/ HCPCS (ALT 636 FOR OP/ED)

## 2023-11-21 PROCEDURE — 83010 ASSAY OF HAPTOGLOBIN QUANT: CPT | Performed by: STUDENT IN AN ORGANIZED HEALTH CARE EDUCATION/TRAINING PROGRAM

## 2023-11-21 PROCEDURE — 86162 COMPLEMENT TOTAL (CH50): CPT | Performed by: INTERNAL MEDICINE

## 2023-11-21 PROCEDURE — 1100000001 HC PRIVATE ROOM DAILY

## 2023-11-21 PROCEDURE — 80069 RENAL FUNCTION PANEL: CPT

## 2023-11-21 PROCEDURE — 86160 COMPLEMENT ANTIGEN: CPT | Performed by: INTERNAL MEDICINE

## 2023-11-21 PROCEDURE — 2500000002 HC RX 250 W HCPCS SELF ADMINISTERED DRUGS (ALT 637 FOR MEDICARE OP, ALT 636 FOR OP/ED)

## 2023-11-21 PROCEDURE — 83615 LACTATE (LD) (LDH) ENZYME: CPT | Performed by: STUDENT IN AN ORGANIZED HEALTH CARE EDUCATION/TRAINING PROGRAM

## 2023-11-21 PROCEDURE — 99233 SBSQ HOSP IP/OBS HIGH 50: CPT | Performed by: INTERNAL MEDICINE

## 2023-11-21 PROCEDURE — 36415 COLL VENOUS BLD VENIPUNCTURE: CPT | Performed by: INTERNAL MEDICINE

## 2023-11-21 PROCEDURE — 2500000001 HC RX 250 WO HCPCS SELF ADMINISTERED DRUGS (ALT 637 FOR MEDICARE OP)

## 2023-11-21 PROCEDURE — 36415 COLL VENOUS BLD VENIPUNCTURE: CPT

## 2023-11-21 PROCEDURE — 83735 ASSAY OF MAGNESIUM: CPT

## 2023-11-21 PROCEDURE — 87517 HEPATITIS B DNA QUANT: CPT | Performed by: INTERNAL MEDICINE

## 2023-11-21 PROCEDURE — 80197 ASSAY OF TACROLIMUS: CPT

## 2023-11-21 PROCEDURE — 82947 ASSAY GLUCOSE BLOOD QUANT: CPT

## 2023-11-21 PROCEDURE — 2500000005 HC RX 250 GENERAL PHARMACY W/O HCPCS

## 2023-11-21 PROCEDURE — 82610 CYSTATIN C: CPT | Performed by: INTERNAL MEDICINE

## 2023-11-21 PROCEDURE — 96372 THER/PROPH/DIAG INJ SC/IM: CPT

## 2023-11-21 RX ORDER — LOSARTAN POTASSIUM 25 MG/1
25 TABLET ORAL DAILY
Status: DISCONTINUED | OUTPATIENT
Start: 2023-11-21 | End: 2023-11-22 | Stop reason: HOSPADM

## 2023-11-21 RX ORDER — AZATHIOPRINE 50 MG/1
25 TABLET ORAL DAILY
Status: DISCONTINUED | OUTPATIENT
Start: 2023-11-21 | End: 2023-11-22 | Stop reason: HOSPADM

## 2023-11-21 RX ORDER — FUROSEMIDE 10 MG/ML
40 INJECTION INTRAMUSCULAR; INTRAVENOUS ONCE
Status: COMPLETED | OUTPATIENT
Start: 2023-11-21 | End: 2023-11-21

## 2023-11-21 RX ADMIN — METHOCARBAMOL TABLETS 500 MG: 500 TABLET, COATED ORAL at 14:14

## 2023-11-21 RX ADMIN — LOSARTAN POTASSIUM 25 MG: 25 TABLET, FILM COATED ORAL at 13:03

## 2023-11-21 RX ADMIN — CARVEDILOL 37.5 MG: 25 TABLET, FILM COATED ORAL at 22:42

## 2023-11-21 RX ADMIN — INSULIN LISPRO 2 UNITS: 100 INJECTION, SOLUTION INTRAVENOUS; SUBCUTANEOUS at 13:03

## 2023-11-21 RX ADMIN — METHOCARBAMOL TABLETS 500 MG: 500 TABLET, COATED ORAL at 22:42

## 2023-11-21 RX ADMIN — CARVEDILOL 37.5 MG: 25 TABLET, FILM COATED ORAL at 09:51

## 2023-11-21 RX ADMIN — OXYCODONE HYDROCHLORIDE 5 MG: 5 TABLET ORAL at 16:28

## 2023-11-21 RX ADMIN — METHOCARBAMOL TABLETS 500 MG: 500 TABLET, COATED ORAL at 06:29

## 2023-11-21 RX ADMIN — ACETAMINOPHEN 975 MG: 325 TABLET ORAL at 16:32

## 2023-11-21 RX ADMIN — INSULIN GLARGINE 20 UNITS: 100 INJECTION, SOLUTION SUBCUTANEOUS at 09:53

## 2023-11-21 RX ADMIN — HYDRALAZINE HYDROCHLORIDE 50 MG: 25 TABLET ORAL at 22:41

## 2023-11-21 RX ADMIN — TACROLIMUS 2.5 MG: 0.5 CAPSULE ORAL at 18:14

## 2023-11-21 RX ADMIN — INSULIN LISPRO 2 UNITS: 100 INJECTION, SOLUTION INTRAVENOUS; SUBCUTANEOUS at 09:53

## 2023-11-21 RX ADMIN — HEPARIN SODIUM 5000 UNITS: 5000 INJECTION INTRAVENOUS; SUBCUTANEOUS at 22:00

## 2023-11-21 RX ADMIN — TRAMADOL HYDROCHLORIDE 50 MG: 50 TABLET, COATED ORAL at 18:14

## 2023-11-21 RX ADMIN — FERROUS SULFATE TAB 325 MG (65 MG ELEMENTAL FE) 65 MG OF IRON: 325 (65 FE) TAB at 22:42

## 2023-11-21 RX ADMIN — CINACALCET 30 MG: 30 TABLET ORAL at 09:53

## 2023-11-21 RX ADMIN — Medication 5 MG: at 20:30

## 2023-11-21 RX ADMIN — TACROLIMUS 2.5 MG: 0.5 CAPSULE ORAL at 06:29

## 2023-11-21 RX ADMIN — OXYCODONE HYDROCHLORIDE 5 MG: 5 TABLET ORAL at 22:42

## 2023-11-21 RX ADMIN — FUROSEMIDE 40 MG: 10 INJECTION, SOLUTION INTRAMUSCULAR; INTRAVENOUS at 13:02

## 2023-11-21 RX ADMIN — AMLODIPINE BESYLATE 10 MG: 10 TABLET ORAL at 09:53

## 2023-11-21 RX ADMIN — PRAVASTATIN SODIUM 40 MG: 20 TABLET ORAL at 22:43

## 2023-11-21 RX ADMIN — HYDRALAZINE HYDROCHLORIDE 50 MG: 25 TABLET ORAL at 14:14

## 2023-11-21 RX ADMIN — FERROUS SULFATE TAB 325 MG (65 MG ELEMENTAL FE) 65 MG OF IRON: 325 (65 FE) TAB at 09:53

## 2023-11-21 RX ADMIN — OXYCODONE HYDROCHLORIDE 5 MG: 5 TABLET ORAL at 09:51

## 2023-11-21 RX ADMIN — LIDOCAINE 1 PATCH: 4 PATCH TOPICAL at 09:59

## 2023-11-21 RX ADMIN — INSULIN LISPRO 2 UNITS: 100 INJECTION, SOLUTION INTRAVENOUS; SUBCUTANEOUS at 17:04

## 2023-11-21 RX ADMIN — ACETAMINOPHEN 975 MG: 325 TABLET ORAL at 09:51

## 2023-11-21 RX ADMIN — HYDRALAZINE HYDROCHLORIDE 50 MG: 25 TABLET ORAL at 09:00

## 2023-11-21 RX ADMIN — Medication 2000 UNITS: at 09:53

## 2023-11-21 RX ADMIN — FUROSEMIDE 40 MG: 10 INJECTION, SOLUTION INTRAVENOUS at 22:41

## 2023-11-21 RX ADMIN — LORATADINE 10 MG: 10 TABLET ORAL at 09:53

## 2023-11-21 RX ADMIN — AZATHIOPRINE 25 MG: 50 TABLET ORAL at 15:33

## 2023-11-21 RX ADMIN — PREDNISONE 10 MG: 5 TABLET ORAL at 09:53

## 2023-11-21 ASSESSMENT — PAIN - FUNCTIONAL ASSESSMENT
PAIN_FUNCTIONAL_ASSESSMENT: 0-10

## 2023-11-21 ASSESSMENT — PAIN DESCRIPTION - LOCATION
LOCATION: BACK

## 2023-11-21 ASSESSMENT — PAIN SCALES - GENERAL
PAINLEVEL_OUTOF10: 9
PAINLEVEL_OUTOF10: 10 - WORST POSSIBLE PAIN
PAINLEVEL_OUTOF10: 7

## 2023-11-21 NOTE — CARE PLAN
The patient's goals for the shift include      The clinical goals for the shift include Patient will verbalize decreased pain during shift    Over the shift, the patient did not make progress toward the following goals.

## 2023-11-21 NOTE — PROGRESS NOTES
Subjective   Manolo Bashir is a 68 y.o. male on hospital day 5 without complaints.  Patient is doing well overall.  Does complain of some backache which he believes may be from laying in bed too much.  Site of biopsy is not causing any discomfort.    Objective     Exam     Vitals:    11/20/23 2102 11/20/23 2303 11/21/23 0431 11/21/23 1300   BP: 162/88 156/68 168/86 161/69   Patient Position:    Lying   Pulse: 75 69 62 66   Resp: 18      Temp:   36.3 °C (97.3 °F) 36.8 °C (98.2 °F)   TempSrc:       SpO2: 99%  96% 98%   Weight:       Height:          Intake/Output last 3 shifts:  I/O last 3 completed shifts:  In: - (0 mL/kg)   Out: 1500 (19.9 mL/kg) [Urine:1500 (0.6 mL/kg/hr)]  Weight: 75.3 kg     Physical Exam  Vitals reviewed.   Constitutional:       Comments: Sitting at the side of the bed.   HENT:      Head: Normocephalic and atraumatic.   Cardiovascular:      Rate and Rhythm: Normal rate and regular rhythm.      Pulses: Normal pulses.      Heart sounds: No murmur heard.     No friction rub. No gallop.   Pulmonary:      Effort: Pulmonary effort is normal.      Breath sounds: Normal breath sounds. No wheezing, rhonchi or rales.   Abdominal:      General: Bowel sounds are normal. There is no distension.      Palpations: Abdomen is soft.      Tenderness: There is no abdominal tenderness. There is no guarding or rebound.   Musculoskeletal:      Right lower leg: Edema present.      Left lower leg: Edema present.      Comments: 1-2+ bilateral pitting edema lower extremities slightly decreased.  Mild paraspinal tenderness in the lumbar region without any obvious findings otherwise   Skin:     General: Skin is warm and dry.   Neurological:      Mental Status: He is alert.      Comments: Grossly intact   Psychiatric:         Mood and Affect: Mood normal.         Behavior: Behavior normal.            Medications   acetaminophen, 975 mg, oral, q8h  amLODIPine, 10 mg, oral, Daily  azaTHIOprine, 25 mg, oral,  "Daily  carvedilol, 37.5 mg, oral, BID  cholecalciferol, 2,000 Units, oral, Daily  cinacalcet, 30 mg, oral, Daily  ferrous sulfate (325 mg ferrous sulfate), 65 mg of iron, oral, BID  furosemide, 40 mg, intravenous, Once  heparin (porcine), 5,000 Units, subcutaneous, q8h ZOË  hydrALAZINE, 50 mg, oral, TID  insulin glargine, 20 Units, subcutaneous, Daily  insulin lispro, 0-10 Units, subcutaneous, TID with meals  lidocaine, 1 patch, transdermal, Daily  loratadine, 10 mg, oral, Daily  losartan, 25 mg, oral, Daily  melatonin, 5 mg, oral, Nightly  methocarbamol, 500 mg, oral, q8h ZOË  pravastatin, 40 mg, oral, Nightly  predniSONE, 10 mg, oral, q AM  tacrolimus, 2.5 mg, oral, q12h ZOË       PRN medications: dextrose 10 % in water (D10W), dextrose, diclofenac sodium, docusate sodium, glucagon, melatonin, oxyCODONE, polyethylene glycol, traMADol       Labs     All new labs reviewed:  some of the basic labs as follows -     Results from last 7 days   Lab Units 11/21/23 0628 11/20/23  0642 11/19/23  0849   WBC AUTO x10*3/uL 3.3* 3.4* 3.8*   HEMOGLOBIN g/dL 9.1* 9.3* 9.9*   HEMATOCRIT % 27.2* 28.2* 29.7*   PLATELETS AUTO x10*3/uL 65* 69* 93*   NEUTROS PCT AUTO % 64.6 61.9 58.7   LYMPHS PCT AUTO % 23.5 24.8 26.7   MONOS PCT AUTO % 10.1 11.2 11.2   EOS PCT AUTO % 1.2 1.5 2.1            Results from last 72 hours   Lab Units 11/21/23 0628 11/20/23  1446 11/20/23  0642   SODIUM mmol/L 140 141 141   POTASSIUM mmol/L 4.0 4.4 4.0   CHLORIDE mmol/L 109* 110* 111*   CO2 mmol/L 23 23 23   BUN mg/dL 47* 47* 49*   CREATININE mg/dL 3.14* 3.21* 3.16*       Results from last 72 hours   Lab Units 11/21/23 0628 11/20/23  1446 11/20/23  0642   ALBUMIN g/dL 2.5* 2.8* 2.5*       Results from last 72 hours   Lab Units 11/21/23  0628 11/20/23  1446 11/20/23  0642 11/19/23  0849   GLUCOSE mg/dL 238* 157* 178* 160*           No results found for: \"TR1\"  Lab Results   Component Value Date    URINECULTURE CANCELED 07/19/2023    BLOODCULT No growth at " 4 days -  FINAL REPORT 10/19/2023    BLOODCULT No growth at 4 days -  FINAL REPORT 10/19/2023    BLOODCULT  09/19/2023     No Growth at 1 days~No Growth at 2 days~No Growth at 3 days~NO GROWTH at 4 days - FINAL REPORT    BLOODCULT  09/19/2023     No Growth at 1 days~No Growth at 2 days~No Growth at 3 days~NO GROWTH at 4 days - FINAL REPORT            Imaging   US guided percutaneous biopsy renal left  Narrative: Interpreted By:  Lizandro Rodgers,  and Cristo Larose   STUDY:  US GUIDED PERCUTANEOUS BIOPSY RENAL LEFT;  11/20/2023 10:40 am      INDICATION:  Signs/Symptoms:transplant kidney malfunction.      COMPARISON:  CT abdomen pelvis 11/15/2023 ultrasound transplant kidney 08/20/2023      ACCESSION NUMBER(S):  [id number]      ORDERING CLINICIAN:  LIZANDRO RODGERS      TECHNIQUE:  INTERVENTIONALIST(S):  Lizandro Rodgers MD      CONSENT:  The patient was informed of the nature of the proposed procedure. The  purposes, alternatives, risks, and benefits were explained and  discussed. All questions were answered and consent was obtained.      SEDATION:  Moderate conscious IV sedation services (supervision of  administration, induction, and maintenance) were provided by the  physician performing the procedure with intravenous fentanyl 100mcg  and versed 2mg for 10 minutes. The physician was assisted by an  independent trained observer, an interventional radiology nurse, in  the continuous monitoring of patient level of consciousness and  physiologic status.      MEDICATION/CONTRAST:  No additional.      TIME OUT:  A time out was performed immediately prior to procedure start with  the interventional team, correctly identifying the patient name, date  of birth, MRN, procedure, anatomy (including marking of site and  side), patient position, procedure consent form, relevant laboratory  and imaging test results, antibiotic administration, safety  precautions, and procedure-specific equipment needs.      COMPLICATIONS:  No immediate  adverse events identified.      FINDINGS:  The patient was placed in the supine position. Limited sonographic  images of the left lower quadrant were obtained for purposes of  needle guidance, which demonstrated a renal transplant in the left  iliac fossa. The area of concern was prepped and draped under sterile  technique.      1% lidocaine was injected subcutaneously and then into the deeper  tissues surrounding the targeted area for biopsy. An 18 gauge core  biopsy needle was passed via a 17 gauge coaxial introducer needle to  obtain a total of 2 core samples from the lower pole of the  transplant kidney.      Postprocedure images demonstrate no evidence for hemorrhage. The  patient tolerated the procedure well and there were no immediate  complications. Specimen(s) sent to pathology.      Impression: Status post successful ultrasound guided core needle biopsy of left  iliac fossa renal transplant. Specimen(s) Sent to pathology.      I was present for and/or performed the critical portions of the  procedure and immediately available throughout the entire procedure.      I personally reviewed the images/study and I agree with the findings  as stated by Thuan Lemons MD. This study was interpreted at  [place], [place].      Performed and dictated at [facility].      MACRO:  None.      Signed by: Lou Rodgers 11/21/2023 9:40 AM  Dictation workstation:   [id number]     No results found for this or any previous visit from the past 1095 days.     Encounter Date: 11/15/23   ECG 12 lead   Result Value    Ventricular Rate 73    Atrial Rate 300    QRS Duration 138    QT Interval 422    QTC Calculation(Bazett) 464    P Axis 67    R Axis 69    T Axis 39    QRS Count 12    Q Onset 216    P Onset 135    P Offset 200    T Offset 427    QTC Fredericia 450    Narrative    Please see ED Provider Note for formal interpretation  Confirmed by  Adi Wagner ([id number]) on 11/16/2023 3:45:51 PM          Assessment and Plan     MGUS: Bone survey fairly unremarkable.  Preliminary read on renal biopsy shows is not consistent with being related to a multiple myeloma  -Follow-up heme-onc as outpatient    Acute on chronic renal failure: Baseline creatinine is roughly 2.5 -3.  Creatinine is downtrending now to 3.14  -Monitor renal function panel  -Monitor strict ins and outs and daily weights.  Need accurate measurement of urine output  -Avoid nephrotoxic agents.  For now avoid iodinated contrast, NSAIDs.    -Per discussion with transplant nephrology we will resume losartan 25 mg daily  -Avoid hypotension.  We will adjust vasoactive meds to try and keep maps greater than 65  -Renally dose meds when needed  -Continue tacrolimus.  Monitor daily levels in the morning.  We will adjust dosing per guidance from transplant neurology.    -Per my discussion with transplant nephrology we will resume home azathioprine   -Continue prednisone  -Continue Cinacalcet and cholecalciferol  -Follow-up nephrology transplant recommendations.  Input greatly appreciated.  Case discussed this morning  -Follow-up final read on renal biopsy.  -Checking hepatitis C and antiphospholipid antibody.  Pending results may need to consider steroid    Cardiovascular: Blood pressure has been running elevated still  -Continue amlodipine and carvedilol  -Resuming losartan as above  -Continue hydralazine.  Should blood pressure becomes soft will decrease dose of hydralazine versus holding it.  -Per discussion with nephrology will utilize Lasix 40 mg IV twice daily for now.  Will need strict ins and outs monitor daily weight  -Continue statin    Diabetes mellitus: Goal blood glucose 140-180 while inpatient.  Hemoglobin A1c is 6.0.  Over the last 24 hours patient required 2 units of sliding scale insulin  -Continue Lantus.  We will titrate per sliding scale insulin needs.  Increase Lantus back to 20 units  this morning is no longer n.p.o.  -Continue sliding scale insulin    Pancytopenia: Appears somewhat chronic although this is lower than usual but overall stable today.  Suspect related to immunosuppression medications  -Monitor CBC  -Continue iron    DVT prophylaxis    Physical therapy/Occupational Therapy when appropriate    Daniel Peña MD     Of note the above was done with Dragon dictation system.  Note was proofread to minimize errors.

## 2023-11-21 NOTE — CARE PLAN
Problem: Skin  Goal: Prevent/manage excess moisture  Outcome: Progressing     Problem: Pain  Goal: My pain/discomfort is manageable  Outcome: Progressing   The patient's goals for the shift include      The clinical goals for the shift include Patient will rate his pain less than a 4/10 during my shift    Over the shift, the patient did c/o back pain. Medicated with PRN Oxycodone. Started on Robaxin as well.

## 2023-11-21 NOTE — PROGRESS NOTES
"Manolo Bashir is a 68 y.o. male on day 5 of admission presenting with Pyelonephritis.    Subjective   Pt seen and examined this morning. Pt sitting up in bed.  Pt made around 1.5 L urine in the last 24 hrs. No fevers, chills, HA, N/V/D, abdominal pain, or urinary sx.       Objective   Last Recorded Vitals  Blood pressure 166/84, pulse 88, temperature 37.6 °C (99.7 °F), resp. rate 18, height 1.727 m (5' 8\"), weight 75.3 kg (166 lb), SpO2 99 %.    Physical Exam  Vitals reviewed.   Constitutional:       General: He is not in acute distress.     Appearance: Normal appearance. He is normal weight.   HENT:      Head: Normocephalic and atraumatic.      Right Ear: Tympanic membrane normal.      Left Ear: Tympanic membrane normal.      Nose: Nose normal.      Mouth/Throat:      Mouth: Mucous membranes are moist.      Pharynx: Oropharynx is clear.   Eyes:      Extraocular Movements: Extraocular movements intact.      Conjunctiva/sclera: Conjunctivae normal.      Pupils: Pupils are equal, round, and reactive to light.   Cardiovascular:      Rate and Rhythm: Normal rate and regular rhythm.      Pulses: Normal pulses.      Heart sounds: Normal heart sounds. No murmur heard.     No friction rub. No gallop.   Pulmonary:      Effort: Pulmonary effort is normal. No respiratory distress.      Breath sounds: Normal breath sounds. No wheezing, rhonchi or rales.   Chest:      Chest wall: No tenderness.   Abdominal:      General: Abdomen is flat. Bowel sounds are normal. There is distension.      Palpations: Abdomen is soft. There is no mass.      Tenderness: There is no abdominal tenderness. There is no right CVA tenderness, left CVA tenderness, guarding or rebound.      Hernia: No hernia is present.      Comments: Distended, nontender, soft   Musculoskeletal:         General: Normal range of motion.      Right lower leg: Edema present.      Left lower leg: Edema present.      Comments: R sided mid thoracic tenderness under R posterior " scapula   Skin:     General: Skin is warm and dry.      Capillary Refill: Capillary refill takes less than 2 seconds.   Neurological:      General: No focal deficit present.      Mental Status: He is alert and oriented to person, place, and time. Mental status is at baseline.   Psychiatric:         Mood and Affect: Mood normal.         Behavior: Behavior normal.         Thought Content: Thought content normal.         Judgment: Judgment normal.         Intake/Output last 3 Shifts:  I/O last 3 completed shifts:  In: 600 (8 mL/kg) [P.O.:600]  Out: 1750 (23.2 mL/kg) [Urine:1750 (0.6 mL/kg/hr)]  Weight: 75.3 kg     Relevant Results  Results from last 7 days   Lab Units 11/20/23  0642 11/19/23  0849 11/18/23  0613   WBC AUTO x10*3/uL 3.4* 3.8* 3.4*   HEMOGLOBIN g/dL 9.3* 9.9* 9.6*   HEMATOCRIT % 28.2* 29.7* 29.3*   PLATELETS AUTO x10*3/uL 69* 93* 93*          Results from last 7 days   Lab Units 11/20/23  1446 11/20/23  0642 11/19/23  0849 11/16/23  1411 11/15/23  1413   SODIUM mmol/L 141 141 142   < > 144   POTASSIUM mmol/L 4.4 4.0 3.8   < > 5.5*   CHLORIDE mmol/L 110* 111* 112*   < > 114*   CO2 mmol/L 23 23 23   < > 24   BUN mg/dL 47* 49* 49*   < > 43*   CREATININE mg/dL 3.21* 3.16* 3.30*   < > 3.42*   CALCIUM mg/dL 8.4* 8.1* 8.7   < > 8.8   PROTEIN TOTAL g/dL  --   --   --   --  5.5*   BILIRUBIN TOTAL mg/dL  --   --   --   --  0.4   ALK PHOS U/L  --   --   --   --  74   ALT U/L  --   --   --   --  24   AST U/L  --   --   --   --  14   GLUCOSE mg/dL 157* 178* 160*   < > 220*    < > = values in this interval not displayed.             Assessment/Plan   Principal Problem:    Pyelonephritis    Manolo Bashir is a 68 y.o. male with a PMHx of ESRD s/p renal transplant (1992, 2013 (left kidney)), HTN, TIIDM, and prior HCV presenting with several days of anuria, dysuria, 9/10 sharp back pain radiating to abdomen, and LE swelling. Differential diagnosis includes pyelonephritis vs. Plasma cell disease leading to renal insult.  Etiology of volume overload unclear. Lack of significant WBC increase, negative UA, and lack of urinary symptoms make pyelonephritis less likely. FeNa 1.3% suggesting intrinsic renal pathology. Pt previously with anuria and now making urine on admission. Presentation likely due to renal insult from MGUS vs multiple myeloma    Updates 11/21:  - Prelim biopsy results showing focal proliferative glomerulonephritis, no multiple myeloma.  - Diurese with 40 BID Lasix today  - Start losartan 25 mg daily  - Resume azathioprine per nephrology     #ISIAH on CKD3 (Scr 3.42, baseline 2.8)  #S/p renal transplant (1992, 2013)  ::POCUS ruled out hydronephrosis  ::CTA/P showing perinephric fat stranding and anasarca  ::FeNa 1.3%  -Strict I/Os  -Lasix 40 mg BID  -Nephrology consulted, appreciate recs  -Consider renal bx on 11/20, follow up plan with nephrology  -Continue home prednisone 10 mg  -Resume azathioprine per nephrology  -Continue home tacrolimus 2.5 mg  -Continue home cinacalcet 30 mg  -Continue home cholecalciferol  -Restart home losartan  -Hold home azathioprine    #Possible pyelonephritis - now low suspicion   -s/p ceftriaxone 11/16 - 11/18  -UA negative - no reflex to cx  -Trend WBC     #HTN  #HLD  - increase home carvedilol from 25mg BID to 37.5mg BID  - Continue home hydralazine 25 mg TID  - Continue home amlodipine 10 mg daily  - Adequate pain control for HTN  - Hold home losartan      #Back pain  ::MRI 09/19/2023 showing nonspecific focus of abnormal signal in R pedicle of L4 c/w osseous hemangioma, stress fracture, or osteoid osteoma. No discitis or osteomyelitis. No evidence of metastasis.  - Oxy 5 mg q6h PRN started for severe pain  - Skeletal survey to r/o lytic lesions  - Tylenol 975 mg q8h   - Lidocaine patch  - Tramadol 50mg q12 prn  - methocarbamol 500mg TID       #TIIDM  - increase Insulin glargine from 12 U daily to 20 U daily (home dose is 24U daily)  - Moderate SSI     F: PRN  E: PRN  N; Low  potassium  Ppx: SQH     Full Code  NOK: Amalia Enriquez, significant other - [phone]     Guillermo Oneal MD  PGY-1  [place] [id number]

## 2023-11-21 NOTE — NURSING NOTE
Geriatric Nursing Rounds summary  Mr Bashir is a 68 year old admitted ploynephritis  Eating well  Cam neg

## 2023-11-21 NOTE — SIGNIFICANT EVENT
Manolo Bashir (1955)  Small cortical sample (predominantly medulla)  9 glomeruli, 3 globally sclerosed  -- light microscopy appears to show a focal proliferative glomerulonephritis with one cellular crescent (more levels pending)  -- IF not specific with only 1 glomerulus (EM pending)     Approximately 5% interstitial fibrosis     Negative for acute T cell mediated rejection; no evidence of acute antibody-mediated r ejection; no viral cytopathic changes.  [place] red negative for amyloid.  No evidence of light chain cast nephropathy.

## 2023-11-21 NOTE — CARE PLAN
The patient's goals for the shift include      Problem: Skin  Goal: Prevent/manage excess moisture  Outcome: Progressing     Problem: Pain  Goal: My pain/discomfort is manageable  Outcome: Progressing

## 2023-11-22 ENCOUNTER — PHARMACY VISIT (OUTPATIENT)
Dept: PHARMACY | Facility: CLINIC | Age: 68
End: 2023-11-22
Payer: MEDICAID

## 2023-11-22 VITALS
SYSTOLIC BLOOD PRESSURE: 163 MMHG | HEIGHT: 68 IN | WEIGHT: 166 LBS | BODY MASS INDEX: 25.16 KG/M2 | HEART RATE: 67 BPM | TEMPERATURE: 98.6 F | RESPIRATION RATE: 18 BRPM | DIASTOLIC BLOOD PRESSURE: 77 MMHG | OXYGEN SATURATION: 98 %

## 2023-11-22 DIAGNOSIS — Z94.0 KIDNEY REPLACED BY TRANSPLANT (HHS-HCC): ICD-10-CM

## 2023-11-22 LAB
ALBUMIN SERPL BCP-MCNC: 2.6 G/DL (ref 3.4–5)
ANION GAP SERPL CALC-SCNC: 10 MMOL/L (ref 10–20)
BASOPHILS # BLD AUTO: 0.01 X10*3/UL (ref 0–0.1)
BASOPHILS NFR BLD AUTO: 0.2 %
BUN SERPL-MCNC: 48 MG/DL (ref 6–23)
CALCIUM SERPL-MCNC: 8.2 MG/DL (ref 8.6–10.6)
CELLS ANALYZED: 20 CELLS
CHLORIDE SERPL-SCNC: 109 MMOL/L (ref 98–107)
CHROM ANALY OVERALL INTERP-IMP: NORMAL
CHROMOS CYTO BASIC ASSOC OBS PNL BLD/T: 2 CELLS
CHROMOSOME ANALYSIS MASTER PANEL: 0 CELLS
CHROMOSOME ANALYSIS MASTER PANEL: 0 CELLS
CHROMOSOME ANALYSIS MASTER PANEL: 46 CHROMOSOMES
CHROMOSOME ANALYSIS MASTER PANEL: NORMAL
CO2 SERPL-SCNC: 26 MMOL/L (ref 21–32)
CREAT SERPL-MCNC: 3.51 MG/DL (ref 0.5–1.3)
CYSTATIN C SERPL-MCNC: 3.3 MG/L (ref 0.5–1.2)
ELECTRONICALLY SIGNED BY CYTOGENETICS: NORMAL
EOSINOPHIL # BLD AUTO: 0.02 X10*3/UL (ref 0–0.7)
EOSINOPHIL NFR BLD AUTO: 0.4 %
ERYTHROCYTE [DISTWIDTH] IN BLOOD BY AUTOMATED COUNT: 15.6 % (ref 11.5–14.5)
GFR SERPL CREATININE-BSD FRML MDRD: 18 ML/MIN/1.73M*2
GFR/BSA.PRED SERPLBLD CYS-BASED-ARV: 15 ML/MIN/BSA
GLUCOSE BLD MANUAL STRIP-MCNC: 145 MG/DL (ref 74–99)
GLUCOSE BLD MANUAL STRIP-MCNC: 185 MG/DL (ref 74–99)
GLUCOSE SERPL-MCNC: 148 MG/DL (ref 74–99)
HAPTOGLOB SERPL-MCNC: <10 MG/DL (ref 37–246)
HCT VFR BLD AUTO: 27.3 % (ref 41–52)
HCV RNA SERPL NAA+PROBE-ACNC: NOT DETECTED K[IU]/ML
HCV RNA SERPL NAA+PROBE-LOG IU: NORMAL {LOG_IU}/ML
HGB BLD-MCNC: 9.1 G/DL (ref 13.5–17.5)
HGB RETIC QN: 34 PG (ref 28–38)
IMM GRANULOCYTES # BLD AUTO: 0.03 X10*3/UL (ref 0–0.7)
IMM GRANULOCYTES NFR BLD AUTO: 0.6 % (ref 0–0.9)
IMMATURE RETIC FRACTION: 6.3 %
ISCN BAND LEVEL QL: 400 BANDS
KARYOTYP MAR: 2 CELLS
LYMPHOCYTES # BLD AUTO: 0.94 X10*3/UL (ref 1.2–4.8)
LYMPHOCYTES NFR BLD AUTO: 17.4 %
MAGNESIUM SERPL-MCNC: 1.7 MG/DL (ref 1.6–2.4)
MCH RBC QN AUTO: 28.8 PG (ref 26–34)
MCHC RBC AUTO-ENTMCNC: 33.3 G/DL (ref 32–36)
MCV RBC AUTO: 86 FL (ref 80–100)
MONOCYTES # BLD AUTO: 0.51 X10*3/UL (ref 0.1–1)
MONOCYTES NFR BLD AUTO: 9.5 %
NEUTROPHILS # BLD AUTO: 3.88 X10*3/UL (ref 1.2–7.7)
NEUTROPHILS NFR BLD AUTO: 71.9 %
NRBC BLD-RTO: 0 /100 WBCS (ref 0–0)
PHOSPHATE SERPL-MCNC: 2.8 MG/DL (ref 2.5–4.9)
PLATELET # BLD AUTO: 80 X10*3/UL (ref 150–450)
POTASSIUM SERPL-SCNC: 4 MMOL/L (ref 3.5–5.3)
RBC # BLD AUTO: 3.16 X10*6/UL (ref 4.5–5.9)
RETICS #: 0.04 X10*6/UL (ref 0.02–0.11)
RETICS/RBC NFR AUTO: 1.1 % (ref 0.5–2)
SODIUM SERPL-SCNC: 141 MMOL/L (ref 136–145)
TACROLIMUS BLD-MCNC: 6.9 NG/ML
TOTAL CELLS COUNTED MAR: 20 CELLS
WBC # BLD AUTO: 5.4 X10*3/UL (ref 4.4–11.3)

## 2023-11-22 PROCEDURE — 2500000002 HC RX 250 W HCPCS SELF ADMINISTERED DRUGS (ALT 637 FOR MEDICARE OP, ALT 636 FOR OP/ED)

## 2023-11-22 PROCEDURE — 99233 SBSQ HOSP IP/OBS HIGH 50: CPT | Performed by: INTERNAL MEDICINE

## 2023-11-22 PROCEDURE — 82947 ASSAY GLUCOSE BLOOD QUANT: CPT

## 2023-11-22 PROCEDURE — 96372 THER/PROPH/DIAG INJ SC/IM: CPT

## 2023-11-22 PROCEDURE — 2500000004 HC RX 250 GENERAL PHARMACY W/ HCPCS (ALT 636 FOR OP/ED)

## 2023-11-22 PROCEDURE — 36415 COLL VENOUS BLD VENIPUNCTURE: CPT

## 2023-11-22 PROCEDURE — 85045 AUTOMATED RETICULOCYTE COUNT: CPT | Performed by: STUDENT IN AN ORGANIZED HEALTH CARE EDUCATION/TRAINING PROGRAM

## 2023-11-22 PROCEDURE — 2500000005 HC RX 250 GENERAL PHARMACY W/O HCPCS

## 2023-11-22 PROCEDURE — 85025 COMPLETE CBC W/AUTO DIFF WBC: CPT

## 2023-11-22 PROCEDURE — 80197 ASSAY OF TACROLIMUS: CPT

## 2023-11-22 PROCEDURE — 2500000001 HC RX 250 WO HCPCS SELF ADMINISTERED DRUGS (ALT 637 FOR MEDICARE OP)

## 2023-11-22 PROCEDURE — 83735 ASSAY OF MAGNESIUM: CPT

## 2023-11-22 PROCEDURE — RXMED WILLOW AMBULATORY MEDICATION CHARGE

## 2023-11-22 PROCEDURE — 80069 RENAL FUNCTION PANEL: CPT

## 2023-11-22 PROCEDURE — 99239 HOSP IP/OBS DSCHRG MGMT >30: CPT | Performed by: INTERNAL MEDICINE

## 2023-11-22 RX ORDER — CHOLECALCIFEROL (VITAMIN D3) 50 MCG
2000 TABLET ORAL DAILY
Qty: 90 TABLET | Refills: 0 | Status: SHIPPED | OUTPATIENT
Start: 2023-11-23 | End: 2023-11-22 | Stop reason: HOSPADM

## 2023-11-22 RX ORDER — FUROSEMIDE 20 MG/1
20 TABLET ORAL DAILY PRN
Status: DISCONTINUED | OUTPATIENT
Start: 2023-11-22 | End: 2023-11-22 | Stop reason: HOSPADM

## 2023-11-22 RX ORDER — HYDRALAZINE HYDROCHLORIDE 50 MG/1
50 TABLET, FILM COATED ORAL 3 TIMES DAILY
Qty: 90 TABLET | Refills: 1 | Status: SHIPPED | OUTPATIENT
Start: 2023-11-22 | End: 2023-12-21 | Stop reason: HOSPADM

## 2023-11-22 RX ORDER — TACROLIMUS 0.5 MG/1
2.5 CAPSULE, GELATIN COATED ORAL
Qty: 1 CAPSULE | Refills: 0
Start: 2023-11-22 | End: 2024-02-05 | Stop reason: SDUPTHER

## 2023-11-22 RX ORDER — CARVEDILOL 12.5 MG/1
37.5 TABLET ORAL 2 TIMES DAILY
Qty: 180 TABLET | Refills: 3 | Status: SHIPPED | OUTPATIENT
Start: 2023-11-22 | End: 2023-12-21 | Stop reason: HOSPADM

## 2023-11-22 RX ORDER — FUROSEMIDE 20 MG/1
20 TABLET ORAL DAILY PRN
Qty: 30 TABLET | Refills: 0 | Status: SHIPPED | OUTPATIENT
Start: 2023-11-22 | End: 2023-12-21 | Stop reason: HOSPADM

## 2023-11-22 RX ORDER — FERROUS SULFATE TAB 325 MG (65 MG ELEMENTAL FE) 325 (65 FE) MG
65 TAB ORAL 2 TIMES DAILY
Qty: 180 TABLET | Refills: 0 | Status: SHIPPED | OUTPATIENT
Start: 2023-11-22 | End: 2024-01-21 | Stop reason: HOSPADM

## 2023-11-22 RX ORDER — LANCETS
EACH MISCELLANEOUS 3 TIMES DAILY
Qty: 300 EACH | Refills: 0 | Status: SHIPPED | OUTPATIENT
Start: 2023-11-22

## 2023-11-22 RX ORDER — LIDOCAINE 560 MG/1
1 PATCH PERCUTANEOUS; TOPICAL; TRANSDERMAL DAILY
Qty: 10 PATCH | Refills: 0 | Status: SHIPPED | OUTPATIENT
Start: 2023-11-23 | End: 2023-12-03

## 2023-11-22 RX ORDER — PRAVASTATIN SODIUM 10 MG/1
10 TABLET ORAL NIGHTLY
Qty: 90 TABLET | Refills: 0 | Status: SHIPPED | OUTPATIENT
Start: 2023-11-22 | End: 2024-03-16 | Stop reason: HOSPADM

## 2023-11-22 RX ORDER — VIT C/E/ZN/COPPR/LUTEIN/ZEAXAN 250MG-90MG
50 CAPSULE ORAL DAILY
Qty: 60 CAPSULE | Refills: 0 | Status: SHIPPED | OUTPATIENT
Start: 2023-11-22 | End: 2023-12-21 | Stop reason: HOSPADM

## 2023-11-22 RX ORDER — DICLOFENAC SODIUM 10 MG/G
4 GEL TOPICAL 2 TIMES DAILY PRN
Qty: 100 G | Refills: 0 | Status: SHIPPED | OUTPATIENT
Start: 2023-11-22 | End: 2023-12-21 | Stop reason: HOSPADM

## 2023-11-22 RX ADMIN — LOSARTAN POTASSIUM 25 MG: 25 TABLET, FILM COATED ORAL at 08:38

## 2023-11-22 RX ADMIN — Medication 2000 UNITS: at 08:38

## 2023-11-22 RX ADMIN — FERROUS SULFATE TAB 325 MG (65 MG ELEMENTAL FE) 65 MG OF IRON: 325 (65 FE) TAB at 08:38

## 2023-11-22 RX ADMIN — INSULIN GLARGINE 20 UNITS: 100 INJECTION, SOLUTION SUBCUTANEOUS at 08:39

## 2023-11-22 RX ADMIN — LIDOCAINE 1 PATCH: 4 PATCH TOPICAL at 08:40

## 2023-11-22 RX ADMIN — METHOCARBAMOL TABLETS 500 MG: 500 TABLET, COATED ORAL at 07:11

## 2023-11-22 RX ADMIN — ACETAMINOPHEN 975 MG: 325 TABLET ORAL at 00:30

## 2023-11-22 RX ADMIN — TACROLIMUS 2.5 MG: 0.5 CAPSULE ORAL at 07:11

## 2023-11-22 RX ADMIN — LORATADINE 10 MG: 10 TABLET ORAL at 08:39

## 2023-11-22 RX ADMIN — OXYCODONE HYDROCHLORIDE 5 MG: 5 TABLET ORAL at 07:11

## 2023-11-22 RX ADMIN — PREDNISONE 10 MG: 5 TABLET ORAL at 08:39

## 2023-11-22 RX ADMIN — CARVEDILOL 37.5 MG: 25 TABLET, FILM COATED ORAL at 08:39

## 2023-11-22 RX ADMIN — HYDRALAZINE HYDROCHLORIDE 50 MG: 25 TABLET ORAL at 08:39

## 2023-11-22 RX ADMIN — OXYCODONE HYDROCHLORIDE 5 MG: 5 TABLET ORAL at 12:47

## 2023-11-22 RX ADMIN — HYDRALAZINE HYDROCHLORIDE 50 MG: 25 TABLET ORAL at 14:08

## 2023-11-22 RX ADMIN — METHOCARBAMOL TABLETS 500 MG: 500 TABLET, COATED ORAL at 14:08

## 2023-11-22 RX ADMIN — AMLODIPINE BESYLATE 10 MG: 10 TABLET ORAL at 08:39

## 2023-11-22 RX ADMIN — HEPARIN SODIUM 5000 UNITS: 5000 INJECTION INTRAVENOUS; SUBCUTANEOUS at 07:12

## 2023-11-22 RX ADMIN — AZATHIOPRINE 25 MG: 50 TABLET ORAL at 08:39

## 2023-11-22 RX ADMIN — CINACALCET 30 MG: 30 TABLET ORAL at 08:38

## 2023-11-22 RX ADMIN — INSULIN LISPRO 2 UNITS: 100 INJECTION, SOLUTION INTRAVENOUS; SUBCUTANEOUS at 12:48

## 2023-11-22 ASSESSMENT — PAIN DESCRIPTION - LOCATION
LOCATION: BACK
LOCATION: BACK

## 2023-11-22 ASSESSMENT — PAIN SCALES - GENERAL
PAINLEVEL_OUTOF10: 8
PAINLEVEL_OUTOF10: 8
PAINLEVEL_OUTOF10: 4

## 2023-11-22 ASSESSMENT — PAIN - FUNCTIONAL ASSESSMENT
PAIN_FUNCTIONAL_ASSESSMENT: 0-10
PAIN_FUNCTIONAL_ASSESSMENT: 0-10

## 2023-11-22 NOTE — CARE PLAN
Problem: Skin  Goal: Prevent/manage excess moisture  Outcome: Progressing     Problem: Pain  Goal: My pain/discomfort is manageable  Outcome: Progressing   The patient's goals for the shift include      The clinical goals for the shift include Patient will rate his pain less than a 4/10 during my shift    Patient still having back pain. Medicated with a muscle relaxant and Oxycodone with relief.

## 2023-11-22 NOTE — PROGRESS NOTES
"        INPATIENT TRANSPLANT NEPHROLOGY PROGRESS NOTE          REASON FOR CONSULT:  Immunosuppressive medication management and nephrology related issues.    SUBJECTION:     Awaiting EM result and final biopsy report.  C3 -Low  C4 normal  CH50- PENDING  HCV KATRIN negative ( hx of HCV)  ANCA -Pending, but can follow up as outpatient.    Creatinine bumped up today - expected post ARB initiation. Though within acceptable range to continue current regiment. K was normal.     No acute event overnight.    PHYCISCAL EXAMINATION:    Visit Vitals  /85   Pulse 69   Temp 37.1 °C (98.8 °F)   Resp 18   Ht 1.727 m (5' 8\")   Wt 75.3 kg (166 lb)   SpO2 96%   BMI 25.24 kg/m²   Smoking Status Never   BSA 1.9 m²        11/20 1900 - 11/22 0659  In: 720 [P.O.:720]  Out: 1200 [Urine:1200]     Weight change:     General Appearance - NAD, Good speech, oriented and alert  HEENT - Supple. Not pale. No jaundice. No cervical lymphadenopathy. Pharynx and tonsils are not injected.  CVS - RRR. Normal S1/S2. No murmur, click , rub or gallop  Lungs- clear to auscultation bilaterally  Abdomen - soft , not tender, no guarding, no rigidity. No hepatosplenomegaly. Normal bowel sounds. No masses and ascites. S/P Kidney transplant .  Transplanted kidney is not tender.   Musculoskeletal /Extremities - no edema. Full ROM. No joint tenderness.   Neuro/Psych - appropriate mood and affect. Motor power V/V all extremities. CN I -XII were grossly intact.  Skin - No visible rash    MEDICATION LIST:  acetaminophen, 975 mg, q8h  amLODIPine, 10 mg, Daily  azaTHIOprine, 25 mg, Daily  carvedilol, 37.5 mg, BID  cholecalciferol, 2,000 Units, Daily  cinacalcet, 30 mg, Daily  ferrous sulfate (325 mg ferrous sulfate), 65 mg of iron, BID  heparin (porcine), 5,000 Units, q8h ZOË  hydrALAZINE, 50 mg, TID  insulin glargine, 20 Units, Daily  insulin lispro, 0-10 Units, TID with meals  lidocaine, 1 patch, Daily  loratadine, 10 mg, Daily  losartan, 25 mg, Daily  melatonin, 5 " mg, Nightly  methocarbamol, 500 mg, q8h ZOË  pravastatin, 40 mg, Nightly  predniSONE, 10 mg, q AM  tacrolimus, 2.5 mg, q12h ZOË         dextrose 10 % in water (D10W), 0.3 g/kg/hr, Once PRN  dextrose, 25 g, q15 min PRN  diclofenac sodium, 4 g, 4x daily PRN  docusate sodium, 100 mg, BID PRN  glucagon, 1 mg, q15 min PRN  melatonin, 5 mg, Nightly PRN  oxyCODONE, 5 mg, q6h PRN  polyethylene glycol, 17 g, Daily PRN  traMADol, 50 mg, q12h PRN        ALLERGY:  No Known Allergies    LABS:  Results for orders placed or performed during the hospital encounter of 11/15/23 (from the past 24 hour(s))   Renal Function Panel   Result Value Ref Range    Glucose 170 (H) 74 - 99 mg/dL    Sodium 140 136 - 145 mmol/L    Potassium 4.2 3.5 - 5.3 mmol/L    Chloride 107 98 - 107 mmol/L    Bicarbonate 26 21 - 32 mmol/L    Anion Gap 11 10 - 20 mmol/L    Urea Nitrogen 48 (H) 6 - 23 mg/dL    Creatinine 3.11 (H) 0.50 - 1.30 mg/dL    eGFR 21 (L) >60 mL/min/1.73m*2    Calcium 8.4 (L) 8.6 - 10.6 mg/dL    Phosphorus 2.7 2.5 - 4.9 mg/dL    Albumin 3.0 (L) 3.4 - 5.0 g/dL   Lactate dehydrogenase   Result Value Ref Range     84 - 246 U/L   POCT GLUCOSE   Result Value Ref Range    POCT Glucose 187 (H) 74 - 99 mg/dL   Tacrolimus level   Result Value Ref Range    Tacrolimus  6.9 <=15.0 ng/mL   Magnesium   Result Value Ref Range    Magnesium 1.70 1.60 - 2.40 mg/dL   Renal Function Panel   Result Value Ref Range    Glucose 148 (H) 74 - 99 mg/dL    Sodium 141 136 - 145 mmol/L    Potassium 4.0 3.5 - 5.3 mmol/L    Chloride 109 (H) 98 - 107 mmol/L    Bicarbonate 26 21 - 32 mmol/L    Anion Gap 10 10 - 20 mmol/L    Urea Nitrogen 48 (H) 6 - 23 mg/dL    Creatinine 3.51 (H) 0.50 - 1.30 mg/dL    eGFR 18 (L) >60 mL/min/1.73m*2    Calcium 8.2 (L) 8.6 - 10.6 mg/dL    Phosphorus 2.8 2.5 - 4.9 mg/dL    Albumin 2.6 (L) 3.4 - 5.0 g/dL   CBC and Auto Differential   Result Value Ref Range    WBC 5.4 4.4 - 11.3 x10*3/uL    nRBC 0.0 0.0 - 0.0 /100 WBCs    RBC 3.16 (L)  4.50 - 5.90 x10*6/uL    Hemoglobin 9.1 (L) 13.5 - 17.5 g/dL    Hematocrit 27.3 (L) 41.0 - 52.0 %    MCV 86 80 - 100 fL    MCH 28.8 26.0 - 34.0 pg    MCHC 33.3 32.0 - 36.0 g/dL    RDW 15.6 (H) 11.5 - 14.5 %    Platelets 80 (L) 150 - 450 x10*3/uL    Neutrophils % 71.9 40.0 - 80.0 %    Immature Granulocytes %, Automated 0.6 0.0 - 0.9 %    Lymphocytes % 17.4 13.0 - 44.0 %    Monocytes % 9.5 2.0 - 10.0 %    Eosinophils % 0.4 0.0 - 6.0 %    Basophils % 0.2 0.0 - 2.0 %    Neutrophils Absolute 3.88 1.20 - 7.70 x10*3/uL    Immature Granulocytes Absolute, Automated 0.03 0.00 - 0.70 x10*3/uL    Lymphocytes Absolute 0.94 (L) 1.20 - 4.80 x10*3/uL    Monocytes Absolute 0.51 0.10 - 1.00 x10*3/uL    Eosinophils Absolute 0.02 0.00 - 0.70 x10*3/uL    Basophils Absolute 0.01 0.00 - 0.10 x10*3/uL   Reticulocytes   Result Value Ref Range    Retic % 1.1 0.5 - 2.0 %    Retic Absolute 0.035 0.017 - 0.110 x10*6/uL    Reticulocyte Hemoglobin 34 28 - 38 pg    Immature Retic fraction 6.3 <=16.0 %   POCT GLUCOSE   Result Value Ref Range    POCT Glucose 145 (H) 74 - 99 mg/dL   POCT GLUCOSE   Result Value Ref Range    POCT Glucose 185 (H) 74 - 99 mg/dL        ASSESSMENT AND PLAN:    Mr. Bashir is a 68 y.o. male who underwent a kidney transplant surgery  on 7/13/2013 (Kidney), 3/26/1994 (Kidney). He also has hx of  DM2, PRCA s/p rdical prostatectomy and HCV. Mr. Bashir presented to the ED on 11/15/2023 with chief complaint of difficulty urinating for the last few days along with increased swelling in the lower extremities . Noted slightly elevated Creatinine upon admission.      Transplant nephrology is consulted to assist with immunosuppressive medication management and nephrology related issues.     Principal Problem:    Pyelonephritis      1. ESRD S/P Kidney transplant.   - Renal allograft function:   Lab Results   Component Value Date    CREATININE 3.51 (H) 11/22/2023     Serum creatinine: 3.51 mg/dL (H) 11/22/23 0708  Estimated creatinine  clearance: 19.8 mL/min (A)    Intake/Output Summary (Last 24 hours) at 11/22/2023 1226  Last data filed at 11/22/2023 1000  Gross per 24 hour   Intake 720 ml   Output 650 ml   Net 70 ml       Prelim Bx  Small cortical sample (predominantly medulla)  9 glomeruli, 3 globally sclerosed  -- light microscopy appears to show a focal proliferative glomerulonephritis with one cellular crescent (more levels pending)  -- IF not specific with only 1 glomerulus (EM pending)     Approximately 5% interstitial fibrosis     Negative for acute T cell mediated rejection; no evidence of acute antibody-mediated r ejection; no viral cytopathic changes.  Congo red negative for amyloid.  No evidence of light chain cast nephropathy.   - Continue to monitor UOP and Serum creatinine closely.   - Avoid nephrotoxic agents, NSAIDs and IV contrast   - Strict I/O.   - Renally dose all medications by the most recent CrCl from Cockcroft-Gault formula.    -Awaiting GN labs    2. Immunosuppression   - continue current immunosuppression   - Monitor tacrolimus trough level = 6.9; discharge on current dose  -Resumed Azathioprine home dose since there is no evidence of myeloma kidney and noted proliferative GN on biopsy.       3. Anemia and WBC   Lab Results   Component Value Date    WBC 5.4 11/22/2023    HGB 9.1 (L) 11/22/2023    HCT 27.3 (L) 11/22/2023    MCV 86 11/22/2023    PLT 80 (L) 11/22/2023     -Continue to monitor Hgb   -No indications for PRBC transfusion     4. Electrolyte   Lab Results   Component Value Date    GLUCOSE 148 (H) 11/22/2023    CALCIUM 8.2 (L) 11/22/2023     11/22/2023    K 4.0 11/22/2023    CO2 26 11/22/2023     (H) 11/22/2023    BUN 48 (H) 11/22/2023    CREATININE 3.51 (H) 11/22/2023     - Reviewed renal profile.     6. Hypertension   Blood Pressures         11/21/2023  0431 11/21/2023  1300 11/21/2023  1627 11/21/2023  2241 11/22/2023  0410    BP: 168/86 161/69 167/78 182/78 164/85          -Goal BP < 140/90 mmHg    -continue current management     7.  GI prophylaxis   - On PPI     8. DVT Prophylaxis  -Defer to primary team    Dispo : Home today. Lab on Monday. Follow up in transplant clinic 2-3 weeks.    * Case was discussed with primary team.  For questions, please contact transplant nephrology page x [phone]    Marion Bridges    Transplant Nephrologist

## 2023-11-22 NOTE — CARE PLAN
Problem: Skin  Goal: Prevent/manage excess moisture  Outcome: Progressing  Flowsheets (Taken 11/22/2023 0115)  Prevent/manage excess moisture:   Cleanse incontinence/protect with barrier cream   Moisturize dry skin     Problem: Pain  Goal: My pain/discomfort is manageable  Outcome: Progressing

## 2023-11-22 NOTE — PROGRESS NOTES
Subjective   Manolo Bashir is a 68 y.o. male on hospital day 6 without complaints.  Patient is doing well overall other than mild backache still  Objective     Exam     Vitals:    11/21/23 1627 11/21/23 2241 11/22/23 0410 11/22/23 1300   BP: 167/78 (!) 182/78 164/85 163/77   BP Location: Right arm      Patient Position: Sitting      Pulse: 104 102 69 67   Resp:    18   Temp:   37.1 °C (98.8 °F) 37 °C (98.6 °F)   TempSrc:       SpO2:   96% 98%   Weight:       Height:          Intake/Output last 3 shifts:  I/O last 3 completed shifts:  In: 720 (9.6 mL/kg) [P.O.:720]  Out: 1200 (15.9 mL/kg) [Urine:1200 (0.4 mL/kg/hr)]  Weight: 75.3 kg     Physical Exam  Vitals reviewed.   Constitutional:       General: He is not in acute distress.     Comments: Sitting at the side of the bed eating.  Patient appears in no apparent distress   HENT:      Head: Normocephalic and atraumatic.   Cardiovascular:      Rate and Rhythm: Normal rate and regular rhythm.      Pulses: Normal pulses.      Heart sounds: No murmur heard.     No friction rub. No gallop.   Pulmonary:      Effort: Pulmonary effort is normal.      Breath sounds: Normal breath sounds. No wheezing, rhonchi or rales.   Abdominal:      General: Bowel sounds are normal. There is no distension.      Palpations: Abdomen is soft.      Tenderness: There is no abdominal tenderness. There is no guarding or rebound.   Musculoskeletal:      Right lower leg: Edema present.      Left lower leg: Edema present.      Comments: 1+ bilateral pitting edema lower extremities     Skin:     General: Skin is warm and dry.   Neurological:      Mental Status: He is alert.      Comments: Grossly intact   Psychiatric:         Mood and Affect: Mood normal.         Behavior: Behavior normal.            Medications   acetaminophen, 975 mg, oral, q8h  amLODIPine, 10 mg, oral, Daily  azaTHIOprine, 25 mg, oral, Daily  carvedilol, 37.5 mg, oral, BID  cholecalciferol, 2,000 Units, oral, Daily  cinacalcet,  "30 mg, oral, Daily  ferrous sulfate (325 mg ferrous sulfate), 65 mg of iron, oral, BID  heparin (porcine), 5,000 Units, subcutaneous, q8h ZOË  hydrALAZINE, 50 mg, oral, TID  insulin glargine, 20 Units, subcutaneous, Daily  insulin lispro, 0-10 Units, subcutaneous, TID with meals  lidocaine, 1 patch, transdermal, Daily  loratadine, 10 mg, oral, Daily  losartan, 25 mg, oral, Daily  melatonin, 5 mg, oral, Nightly  methocarbamol, 500 mg, oral, q8h ZOË  pravastatin, 40 mg, oral, Nightly  predniSONE, 10 mg, oral, q AM  tacrolimus, 2.5 mg, oral, q12h ZOË       PRN medications: dextrose 10 % in water (D10W), dextrose, diclofenac sodium, docusate sodium, furosemide, glucagon, melatonin, oxyCODONE, polyethylene glycol, traMADol       Labs     All new labs reviewed:  some of the basic labs as follows -     Results from last 7 days   Lab Units 11/22/23  0708 11/21/23  0628 11/20/23  0642   WBC AUTO x10*3/uL 5.4 3.3* 3.4*   HEMOGLOBIN g/dL 9.1* 9.1* 9.3*   HEMATOCRIT % 27.3* 27.2* 28.2*   PLATELETS AUTO x10*3/uL 80* 65* 69*   NEUTROS PCT AUTO % 71.9 64.6 61.9   LYMPHS PCT AUTO % 17.4 23.5 24.8   MONOS PCT AUTO % 9.5 10.1 11.2   EOS PCT AUTO % 0.4 1.2 1.5            Results from last 72 hours   Lab Units 11/22/23  0708 11/21/23  1527 11/21/23  0628   SODIUM mmol/L 141 140 140   POTASSIUM mmol/L 4.0 4.2 4.0   CHLORIDE mmol/L 109* 107 109*   CO2 mmol/L 26 26 23   BUN mg/dL 48* 48* 47*   CREATININE mg/dL 3.51* 3.11* 3.14*       Results from last 72 hours   Lab Units 11/22/23  0708 11/21/23  1527 11/21/23  0628   ALBUMIN g/dL 2.6* 3.0* 2.5*       Results from last 72 hours   Lab Units 11/22/23  0708 11/21/23  1527 11/21/23  0628 11/20/23  1446 11/20/23  0642   GLUCOSE mg/dL 148* 170* 238* 157* 178*           No results found for: \"TR1\"  Lab Results   Component Value Date    URINECULTURE CANCELED 07/19/2023    BLOODCULT No growth at 4 days -  FINAL REPORT 10/19/2023    BLOODCULT No growth at 4 days -  FINAL REPORT 10/19/2023    " BLOODCULT  09/19/2023     No Growth at 1 days~No Growth at 2 days~No Growth at 3 days~NO GROWTH at 4 days - FINAL REPORT    BLOODCULT  09/19/2023     No Growth at 1 days~No Growth at 2 days~No Growth at 3 days~NO GROWTH at 4 days - FINAL REPORT            Imaging   US guided percutaneous biopsy renal left  Narrative: Interpreted By:  Lizandro Rodgers,  and Cristo Larose   STUDY:  US GUIDED PERCUTANEOUS BIOPSY RENAL LEFT;  11/20/2023 10:40 am      INDICATION:  Signs/Symptoms:transplant kidney malfunction.      COMPARISON:  CT abdomen pelvis 11/15/2023 ultrasound transplant kidney 08/20/2023      ACCESSION NUMBER(S):  [id number]      ORDERING CLINICIAN:  LIZANDRO RODGERS      TECHNIQUE:  INTERVENTIONALIST(S):  Lizandro Rodgers MD      CONSENT:  The patient was informed of the nature of the proposed procedure. The  purposes, alternatives, risks, and benefits were explained and  discussed. All questions were answered and consent was obtained.      SEDATION:  Moderate conscious IV sedation services (supervision of  administration, induction, and maintenance) were provided by the  physician performing the procedure with intravenous fentanyl 100mcg  and versed 2mg for 10 minutes. The physician was assisted by an  independent trained observer, an interventional radiology nurse, in  the continuous monitoring of patient level of consciousness and  physiologic status.      MEDICATION/CONTRAST:  No additional.      TIME OUT:  A time out was performed immediately prior to procedure start with  the interventional team, correctly identifying the patient name, date  of birth, MRN, procedure, anatomy (including marking of site and  side), patient position, procedure consent form, relevant laboratory  and imaging test results, antibiotic administration, safety  precautions, and procedure-specific equipment needs.      COMPLICATIONS:  No immediate adverse events identified.      FINDINGS:  The patient was placed in the supine position. Limited  sonographic  images of the left lower quadrant were obtained for purposes of  needle guidance, which demonstrated a renal transplant in the left  iliac fossa. The area of concern was prepped and draped under sterile  technique.      1% lidocaine was injected subcutaneously and then into the deeper  tissues surrounding the targeted area for biopsy. An 18 gauge core  biopsy needle was passed via a 17 gauge coaxial introducer needle to  obtain a total of 2 core samples from the lower pole of the  transplant kidney.      Postprocedure images demonstrate no evidence for hemorrhage. The  patient tolerated the procedure well and there were no immediate  complications. Specimen(s) sent to pathology.      Impression: Status post successful ultrasound guided core needle biopsy of left  iliac fossa renal transplant. Specimen(s) Sent to pathology.      I was present for and/or performed the critical portions of the  procedure and immediately available throughout the entire procedure.      I personally reviewed the images/study and I agree with the findings  as stated by Thuan Lemons MD. This study was interpreted at  [place], [place].      Performed and dictated at [facility].      MACRO:  None.      Signed by: Lou Rodgers 11/21/2023 9:40 AM  Dictation workstation:   [id number]     No results found for this or any previous visit from the past 1095 days.     Encounter Date: 11/15/23   ECG 12 lead   Result Value    Ventricular Rate 73    Atrial Rate 300    QRS Duration 138    QT Interval 422    QTC Calculation(Bazett) 464    P Axis 67    R Axis 69    T Axis 39    QRS Count 12    Q Onset 216    P Onset 135    P Offset 200    T Offset 427    QTC Fredericia 450    Narrative    Please see ED Provider Note for formal interpretation  Confirmed by Adi Wagner ([id number]) on 11/16/2023 3:45:51 PM          Assessment and Plan     MGUS: Bone survey  fairly unremarkable.  Preliminary read on renal biopsy shows is not consistent with being related to a multiple myeloma  -Follow-up heme-onc as outpatient    Acute on chronic renal failure: Baseline creatinine is roughly 2.5 -3.  Creatinine back up to 3.5  -Monitor renal function panel.  Plan repeat in next week/Monday with close outpatient transplant nephrology follow-up in the next 2 to 3 weeks  -Avoid nephrotoxic agents.  For now avoid iodinated contrast, NSAIDs.    -Continue tacrolimus.  Monitor daily levels in the morning.  We will adjust dosing per guidance from transplant neurology.    -Continue home azathioprine and prednisone  -Continue Cinacalcet and cholecalciferol  -Follow-up final read on renal biopsy.  -Checking hepatitis C and antiphospholipid antibody.  Pending results may need to consider higher dose steroid    Cardiovascular: Blood pressure has been running elevated still  -Continue amlodipine, losartan and carvedilol  -Continue hydralazine.  Can consider further up titration  -Confirm home-going diuretic plan with nephrology  -Continue statin    Diabetes mellitus: Goal blood glucose 140-180 while inpatient.  Hemoglobin A1c is 6.0.  Over the last 24 hours patient required 6 units of sliding scale insulin  -Continue Lantus.  We will titrate per sliding scale insulin needs.  -Continue sliding scale insulin    Pancytopenia: Appears somewhat chronic and platelets mildly up from yesterday's value.  Suspect related to immunosuppression medications  -Continue iron    Per nephrology anticipating discharge.    Daniel Peña MD     Of note the above was done with Dragon dictation system.  Note was proofread to minimize errors.

## 2023-11-22 NOTE — TREATMENT PLAN
Hematology fup note:    Manolo Bashir is a 68 y.o. male with PMHX of ESRD s/p 2 renal transplants (1992 followed by 2013), CKD 3, DM2, PRCA s/p radical prostatectomy, HCV (treated), and MGUS who presented to the ED on 11/15 with chief complaint of difficulty urinating for the last few days along with increased swelling in the lower extremities. . 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 MGUS diagnosed on 10/20 with Monoclonal IgA lambda in the beta region at 0.2 g/dL, monoclonal IgG lambda in the gamma region at 0.1 g/dL, and monoclonal IgG kappa in the gamma region at 0.1 g/dL. Differential diagnosis includes pyelonephritis vs. Plasma cell disease leading to renal insult. Hematology was consulted given the patient's history of MGUS, and concern that his renal failure is d/t possible myeloma.      Concern for MGUS evolving into Multiple Myeloma contributing to renal failure is low. Very recent bone biopsy (10/26) is with 5% plasma cell neoplasm, strongly favoring MGUS versus MM. The patient has non-IgM MGUS, which carries a risk of malignant transformation of ~1% per annum. Thus, the short interval between the BM biopsy to present would not favor a malignant transformation of MM.     That being said, in the outpatient setting, further possible workup of MM was planned. It would not be unreasonable to obtain a bone scan /skeletal survey while inpatient. A PET scan, however, is reserved only for the outpatient setting in this case. Skeletal survey while inpatient without lytic lesions.     Pt underwent kidney biopsy and preliminary, showing focal proliferative glomerulonephritis. There etiology of ISIAH most likely not MM/MGUS. Pt should continue to fup with Dr Alejandro as outpatient.     Additionally, pt noted to be thrombocytopenia, LDH WNL, hapto UD. Smear with significant schistocytes. In the setting of kidney transplant and  immunosuppression, we alerted Transplant team about c/f TMA possibly 2/2 tacrolimus. Other etiologies for thrombocytopenia remain in the differential including recent cephalosporin use for pyelonephritis.      Recommendations:  - Fup final renal bx results  - Daily CBC w diff. If worsening thrombocytopenia, repeat hemolysis labs  - Fup as outpatient with Dr Alejandro    Hematology will follow peripherally. Discussed with Dr Majano.

## 2023-11-22 NOTE — PROGRESS NOTES
"Manolo Bashir is a 68 y.o. male on day 6 of admission presenting with Pyelonephritis    Subjective   No acute event overnight, Pt seen and examined this morning. Pt sitting up in bed.  Pt made around 750mls urine in the last 24 hrs. No fevers, chills, HA, N/V/D, abdominal pain, or urinary sx.     Current Vitals  /85   Pulse 69   Temp 37.1 °C (98.8 °F)   Resp 18   Ht 1.727 m (5' 8\")   Wt 75.3 kg (166 lb)   SpO2 96%   BMI 25.24 kg/m²      I/O last 3 completed shifts:  In: 720 (9.6 mL/kg) [P.O.:720]  Out: 1200 (15.9 mL/kg) [Urine:1200 (0.4 mL/kg/hr)]  Weight: 75.3 kg     Physical Exam  Vitals reviewed.   Constitutional:       General: He is not in acute distress.     Appearance: Normal appearance. He is normal weight.   HENT:      Head: Normocephalic and atraumatic.      Right Ear: Tympanic membrane normal.      Left Ear: Tympanic membrane normal.      Nose: Nose normal.      Mouth/Throat:      Mouth: Mucous membranes are moist.      Pharynx: Oropharynx is clear.   Eyes:      Extraocular Movements: Extraocular movements intact.      Conjunctiva/sclera: Conjunctivae normal.      Pupils: Pupils are equal, round, and reactive to light.   Cardiovascular:      Rate and Rhythm: Normal rate and regular rhythm.      Pulses: Normal pulses.      Heart sounds: Normal heart sounds. No murmur heard.     No friction rub. No gallop.   Pulmonary:      Effort: Pulmonary effort is normal. No respiratory distress.      Breath sounds: Normal breath sounds. No wheezing, rhonchi or rales.   Chest:      Chest wall: No tenderness.   Abdominal:      General: Abdomen is flat. Bowel sounds are normal. There is distension.      Palpations: Abdomen is soft. There is no mass.      Tenderness: There is no abdominal tenderness. There is no right CVA tenderness, left CVA tenderness, guarding or rebound.      Hernia: No hernia is present.      Comments: Distended, nontender, soft   Musculoskeletal:         General: Normal range of motion. "      Right lower leg: Edema present.      Left lower leg: Edema present.      Comments: R sided mid thoracic tenderness under R posterior scapula   Skin:     General: Skin is warm and dry.      Capillary Refill: Capillary refill takes less than 2 seconds.   Neurological:      General: No focal deficit present.      Mental Status: He is alert and oriented to person, place, and time. Mental status is at baseline.   Psychiatric:         Mood and Affect: Mood normal.         Behavior: Behavior normal.         Thought Content: Thought content normal.         Judgment: Judgment normal.      Relevant Results    Labs:  Most recent  CBC: WBC 5.4 , HGB 9.1, PLT 80  BMP: , K 4.0, Cl 109, HCO3 26, BUN 48, CR 3.51, Glu 148  LFTS: AST 14 , ALT 24, ALKPHOS 74 , TBILI 0.4 , DBILI 0.1  TROP: 38  BNP: 319  COAGS: PT 13.9 , PTT 34  , INR 1.2  UA:   Results from last 7 days   Lab Units 11/18/23  1450 11/15/23  1816   COLOR U   --  Yellow   PH U   --  5.0   SPEC GRAV UR   --  1.024   PROTEIN UR mg/dL 654*  --    PROTEIN U mg/dL  --  >=500 (3+)*   BLOOD UR   --  SMALL (1+)*   NITRITE U   --  NEGATIVE   WBC UR /HPF  --  1-5     ABG:    CALCIUM 8.2 MAG No results found for requested labs within last 365 days. ALB 2.6 LACTATE 0.8 PHOS No results found for requested labs within last 365 days. COVIDNo results found for requested labs within last 365 days.    Micro/culture data:  No results found for the last 90 days.      Imaging:  US guided percutaneous biopsy renal left  Narrative: Interpreted By:  Lou Rodgers and Calo Sean-Matthew   STUDY:  US GUIDED PERCUTANEOUS BIOPSY RENAL LEFT;  11/20/2023 10:40 am      INDICATION:  Signs/Symptoms:transplant kidney malfunction.      COMPARISON:  CT abdomen pelvis 11/15/2023 ultrasound transplant kidney 08/20/2023    CONSENT:  The patient was informed of the nature of the proposed procedure. The  purposes, alternatives, risks, and benefits were explained and  discussed. All questions were  answered and consent was obtained.      SEDATION:  Moderate conscious IV sedation services (supervision of  administration, induction, and maintenance) were provided by the  physician performing the procedure with intravenous fentanyl 100mcg  and versed 2mg for 10 minutes. The physician was assisted by an  independent trained observer, an interventional radiology nurse, in  the continuous monitoring of patient level of consciousness and  physiologic status.      MEDICATION/CONTRAST:  No additional.      TIME OUT:  A time out was performed immediately prior to procedure start with  the interventional team, correctly identifying the patient name, date  of birth, MRN, procedure, anatomy (including marking of site and  side), patient position, procedure consent form, relevant laboratory  and imaging test results, antibiotic administration, safety  precautions, and procedure-specific equipment needs.      COMPLICATIONS:  No immediate adverse events identified.      FINDINGS:  The patient was placed in the supine position. Limited sonographic  images of the left lower quadrant were obtained for purposes of  needle guidance, which demonstrated a renal transplant in the left  iliac fossa. The area of concern was prepped and draped under sterile  technique.      1% lidocaine was injected subcutaneously and then into the deeper  tissues surrounding the targeted area for biopsy. An 18 gauge core  biopsy needle was passed via a 17 gauge coaxial introducer needle to  obtain a total of 2 core samples from the lower pole of the  transplant kidney.      Postprocedure images demonstrate no evidence for hemorrhage. The  patient tolerated the procedure well and there were no immediate  complications. Specimen(s) sent to pathology.      Impression: Status post successful ultrasound guided core needle biopsy of left  iliac fossa renal transplant. Specimen(s) Sent to pathology.    MEDS:  Scheduled medications  acetaminophen, 975 mg,  oral, q8h  amLODIPine, 10 mg, oral, Daily  azaTHIOprine, 25 mg, oral, Daily  carvedilol, 37.5 mg, oral, BID  cholecalciferol, 2,000 Units, oral, Daily  cinacalcet, 30 mg, oral, Daily  ferrous sulfate (325 mg ferrous sulfate), 65 mg of iron, oral, BID  heparin (porcine), 5,000 Units, subcutaneous, q8h ZOË  hydrALAZINE, 50 mg, oral, TID  insulin glargine, 20 Units, subcutaneous, Daily  insulin lispro, 0-10 Units, subcutaneous, TID with meals  lidocaine, 1 patch, transdermal, Daily  loratadine, 10 mg, oral, Daily  losartan, 25 mg, oral, Daily  melatonin, 5 mg, oral, Nightly  methocarbamol, 500 mg, oral, q8h ZOË  pravastatin, 40 mg, oral, Nightly  predniSONE, 10 mg, oral, q AM  tacrolimus, 2.5 mg, oral, q12h ZOË      Continuous medications     PRN medications  PRN medications: dextrose 10 % in water (D10W), dextrose, diclofenac sodium, docusate sodium, glucagon, melatonin, oxyCODONE, polyethylene glycol, traMADol     Assessment/Plan:   Principal Problem:    Pyelonephritis     Manolo Bashir is a 68 y.o. male with a PMHx of ESRD s/p renal transplant (1992, 2013 (left kidney)), HTN, TIIDM, and prior HCV presenting with several days of anuria, dysuria, 9/10 sharp back pain radiating to abdomen, and LE swelling. Differential diagnosis includes pyelonephritis vs. Plasma cell disease leading to renal insult. Etiology of volume overload unclear. Lack of significant WBC increase, negative UA, and lack of urinary symptoms make pyelonephritis less likely. FeNa 1.3% suggesting intrinsic renal pathology. Pt previously with anuria and now making urine on admission. Presentation likely due to renal insult from MGUS vs multiple myeloma     Updates 11/22:  - Follow up final renal biopsy results  - Transplant nephrology ok with discharge and would get lab on Monday. Follow up in 2-3 weeks.    Updates 11/21:  - Prelim biopsy results showing focal proliferative glomerulonephritis, no multiple myeloma.  - Diurese with 40 BID Lasix today  -  Start losartan 25 mg daily  - Resume azathioprine per nephrology     #ISIAH on CKD3 (Scr 3.42, baseline 2.8)  #S/p renal transplant (1992, 2013)  ::POCUS ruled out hydronephrosis  ::CTA/P showing perinephric fat stranding and anasarca  ::FeNa 1.3%  -Strict I/Os  -Lasix 40 mg BID  -Nephrology consulted, appreciate recs  -Consider renal bx on 11/20, follow up plan with nephrology  -Continue home prednisone 10 mg  -Resume azathioprine per nephrology  -Continue home tacrolimus 2.5 mg  -Continue home cinacalcet 30 mg  -Continue home cholecalciferol  -Restart home losartan  -Hold home azathioprine     #Possible pyelonephritis - now low suspicion   -s/p ceftriaxone 11/16 - 11/18  -UA negative - no reflex to cx  -Trend WBC     #HTN  #HLD  - increase home carvedilol from 25mg BID to 37.5mg BID  - Continue home hydralazine 25 mg TID  - Continue home amlodipine 10 mg daily  - Adequate pain control for HTN  - Hold home losartan      #Back pain  ::MRI 09/19/2023 showing nonspecific focus of abnormal signal in R pedicle of L4 c/w osseous hemangioma, stress fracture, or osteoid osteoma. No discitis or osteomyelitis. No evidence of metastasis.  - Oxy 5 mg q6h PRN started for severe pain  - Skeletal survey to r/o lytic lesions  - Tylenol 975 mg q8h   - Lidocaine patch  - Tramadol 50mg q12 prn  - methocarbamol 500mg TID       #TIIDM  - increase Insulin glargine from 12 U daily to 20 U daily (home dose is 24U daily)  - Moderate SSI    Fluids: Replete PRN  Electrolytes: Keep mg >2, phos >3  and K >4  Nutrition:  Adult diet Renal; Potassium Restricted 2 gm (50mEq); 2 - 3 grams Sodium   Antimicrobials:   DVT PPX:DVT: Unfractionated Heparin  GI ppx:   Bowel care:Miralax  Lines:PIV  Oxygen:Room Air      Disposition:   Home    Code Status: Full Code (confirmed on admission)   NOK:  Primary Emergency Contact: Amalia Enriquez, Home Phone: [phone]       Lawrence Silva MD

## 2023-11-22 NOTE — PROGRESS NOTES
"        INPATIENT TRANSPLANT NEPHROLOGY PROGRESS NOTE          REASON FOR CONSULT:  Immunosuppressive medication management and nephrology related issues.    SUBJECTION:     Discussed with pathologist and medicine team.    Sent additional lab due to bx findings.     No acute event overnight.    PHYCISCAL EXAMINATION:    Visit Vitals  /85   Pulse 69   Temp 37.1 °C (98.8 °F)   Resp 18   Ht 1.727 m (5' 8\")   Wt 75.3 kg (166 lb)   SpO2 96%   BMI 25.24 kg/m²   Smoking Status Never   BSA 1.9 m²        11/20 1900 - 11/22 0659  In: 720 [P.O.:720]  Out: 1200 [Urine:1200]     Weight change:     General Appearance - NAD, Good speech, oriented and alert  HEENT - Supple. Not pale. No jaundice. No cervical lymphadenopathy. Pharynx and tonsils are not injected.  CVS - RRR. Normal S1/S2. No murmur, click , rub or gallop  Lungs- clear to auscultation bilaterally  Abdomen - soft , not tender, no guarding, no rigidity. No hepatosplenomegaly. Normal bowel sounds. No masses and ascites. S/P Kidney transplant .  Transplanted kidney is not tender.   Musculoskeletal /Extremities - no edema. Full ROM. No joint tenderness.   Neuro/Psych - appropriate mood and affect. Motor power V/V all extremities. CN I -XII were grossly intact.  Skin - No visible rash    MEDICATION LIST:  acetaminophen, 975 mg, q8h  amLODIPine, 10 mg, Daily  azaTHIOprine, 25 mg, Daily  carvedilol, 37.5 mg, BID  cholecalciferol, 2,000 Units, Daily  cinacalcet, 30 mg, Daily  ferrous sulfate (325 mg ferrous sulfate), 65 mg of iron, BID  heparin (porcine), 5,000 Units, q8h ZOË  hydrALAZINE, 50 mg, TID  insulin glargine, 20 Units, Daily  insulin lispro, 0-10 Units, TID with meals  lidocaine, 1 patch, Daily  loratadine, 10 mg, Daily  losartan, 25 mg, Daily  melatonin, 5 mg, Nightly  methocarbamol, 500 mg, q8h ZOË  pravastatin, 40 mg, Nightly  predniSONE, 10 mg, q AM  tacrolimus, 2.5 mg, q12h ZOË         dextrose 10 % in water (D10W), 0.3 g/kg/hr, Once PRN  dextrose, 25 g, " q15 min PRN  diclofenac sodium, 4 g, 4x daily PRN  docusate sodium, 100 mg, BID PRN  glucagon, 1 mg, q15 min PRN  melatonin, 5 mg, Nightly PRN  oxyCODONE, 5 mg, q6h PRN  polyethylene glycol, 17 g, Daily PRN  traMADol, 50 mg, q12h PRN        ALLERGY:  No Known Allergies    LABS:  Results for orders placed or performed during the hospital encounter of 11/15/23 (from the past 24 hour(s))   POCT GLUCOSE   Result Value Ref Range    POCT Glucose 159 (H) 74 - 99 mg/dL   Renal Function Panel   Result Value Ref Range    Glucose 170 (H) 74 - 99 mg/dL    Sodium 140 136 - 145 mmol/L    Potassium 4.2 3.5 - 5.3 mmol/L    Chloride 107 98 - 107 mmol/L    Bicarbonate 26 21 - 32 mmol/L    Anion Gap 11 10 - 20 mmol/L    Urea Nitrogen 48 (H) 6 - 23 mg/dL    Creatinine 3.11 (H) 0.50 - 1.30 mg/dL    eGFR 21 (L) >60 mL/min/1.73m*2    Calcium 8.4 (L) 8.6 - 10.6 mg/dL    Phosphorus 2.7 2.5 - 4.9 mg/dL    Albumin 3.0 (L) 3.4 - 5.0 g/dL   Lactate dehydrogenase   Result Value Ref Range     84 - 246 U/L   POCT GLUCOSE   Result Value Ref Range    POCT Glucose 187 (H) 74 - 99 mg/dL   Magnesium   Result Value Ref Range    Magnesium 1.70 1.60 - 2.40 mg/dL   Renal Function Panel   Result Value Ref Range    Glucose 148 (H) 74 - 99 mg/dL    Sodium 141 136 - 145 mmol/L    Potassium 4.0 3.5 - 5.3 mmol/L    Chloride 109 (H) 98 - 107 mmol/L    Bicarbonate 26 21 - 32 mmol/L    Anion Gap 10 10 - 20 mmol/L    Urea Nitrogen 48 (H) 6 - 23 mg/dL    Creatinine 3.51 (H) 0.50 - 1.30 mg/dL    eGFR 18 (L) >60 mL/min/1.73m*2    Calcium 8.2 (L) 8.6 - 10.6 mg/dL    Phosphorus 2.8 2.5 - 4.9 mg/dL    Albumin 2.6 (L) 3.4 - 5.0 g/dL   CBC and Auto Differential   Result Value Ref Range    WBC 5.4 4.4 - 11.3 x10*3/uL    nRBC 0.0 0.0 - 0.0 /100 WBCs    RBC 3.16 (L) 4.50 - 5.90 x10*6/uL    Hemoglobin 9.1 (L) 13.5 - 17.5 g/dL    Hematocrit 27.3 (L) 41.0 - 52.0 %    MCV 86 80 - 100 fL    MCH 28.8 26.0 - 34.0 pg    MCHC 33.3 32.0 - 36.0 g/dL    RDW 15.6 (H) 11.5 - 14.5 %     Platelets 80 (L) 150 - 450 x10*3/uL    Neutrophils % 71.9 40.0 - 80.0 %    Immature Granulocytes %, Automated 0.6 0.0 - 0.9 %    Lymphocytes % 17.4 13.0 - 44.0 %    Monocytes % 9.5 2.0 - 10.0 %    Eosinophils % 0.4 0.0 - 6.0 %    Basophils % 0.2 0.0 - 2.0 %    Neutrophils Absolute 3.88 1.20 - 7.70 x10*3/uL    Immature Granulocytes Absolute, Automated 0.03 0.00 - 0.70 x10*3/uL    Lymphocytes Absolute 0.94 (L) 1.20 - 4.80 x10*3/uL    Monocytes Absolute 0.51 0.10 - 1.00 x10*3/uL    Eosinophils Absolute 0.02 0.00 - 0.70 x10*3/uL    Basophils Absolute 0.01 0.00 - 0.10 x10*3/uL   POCT GLUCOSE   Result Value Ref Range    POCT Glucose 145 (H) 74 - 99 mg/dL        ASSESSMENT AND PLAN:    Mr. Bashir is a 68 y.o. male who underwent a kidney transplant surgery  on 7/13/2013 (Kidney), 3/26/1994 (Kidney). He also has hx of  DM2, PRCA s/p rdical prostatectomy and HCV. Mr. Bashir presented to the ED on 11/15/2023 with chief complaint of difficulty urinating for the last few days along with increased swelling in the lower extremities . Noted slightly elevated Creatinine upon admission.      Transplant nephrology is consulted to assist with immunosuppressive medication management and nephrology related issues.      Principal Problem:    Pyelonephritis      1. ESRD S/P Kidney transplant.   - Renal allograft function:   Lab Results   Component Value Date    CREATININE 3.51 (H) 11/22/2023     Serum creatinine: 3.51 mg/dL (H) 11/22/23 0708  Estimated creatinine clearance: 19.8 mL/min (A)    Intake/Output Summary (Last 24 hours) at 11/22/2023 0854  Last data filed at 11/22/2023 0739  Gross per 24 hour   Intake 720 ml   Output 750 ml   Net -30 ml     Prelim Bx  Small cortical sample (predominantly medulla)  9 glomeruli, 3 globally sclerosed  -- light microscopy appears to show a focal proliferative glomerulonephritis with one cellular crescent (more levels pending)  -- IF not specific with only 1 glomerulus (EM pending)     Approximately  5% interstitial fibrosis     Negative for acute T cell mediated rejection; no evidence of acute antibody-mediated r ejection; no viral cytopathic changes.  Congo red negative for amyloid.  No evidence of light chain cast nephropathy.     - Continue to monitor UOP and Serum creatinine closely.   - Avoid nephrotoxic agents, NSAIDs and IV contrast   - Strict I/O.   - Renally dose all medications by the most recent CrCl from Cockcroft-Gault formula.    2. Immunosuppression   Tac = 8.7 ; continue same dose  Resume azathioprine home dose.    - continue current immunosuppression   - Monitor tacrolimus trough level       3. Anemia and WBC   Lab Results   Component Value Date    WBC 5.4 11/22/2023    HGB 9.1 (L) 11/22/2023    HCT 27.3 (L) 11/22/2023    MCV 86 11/22/2023    PLT 80 (L) 11/22/2023     -Continue to monitor Hgb   -No indications for PRBC transfusion     4. Electrolyte   Lab Results   Component Value Date    GLUCOSE 148 (H) 11/22/2023    CALCIUM 8.2 (L) 11/22/2023     11/22/2023    K 4.0 11/22/2023    CO2 26 11/22/2023     (H) 11/22/2023    BUN 48 (H) 11/22/2023    CREATININE 3.51 (H) 11/22/2023     - Reviewed renal profile.     6. Hypertension   Blood Pressures         11/21/2023  0431 11/21/2023  1300 11/21/2023  1627 11/21/2023  2241 11/22/2023  0410    BP: 168/86 161/69 167/78 182/78 164/85          -Goal BP < 140/90 mmHg   -continue current management   -Added losartan 25 mg bid for proteinuria and HTN    7. GI prophylaxis   - On PPI     9. DVT Prophylaxis  -Defer to primary team    * Case was discussed with primary team.  For questions, please contact transplant nephrology page x [phone]    Aim to discharge next day    Marion Bridges    Transplant Nephrologist

## 2023-11-23 LAB
CH50 SERPL-ACNC: 48.4 U/ML (ref 38.7–89.9)
HBV DNA SERPL NAA+PROBE-ACNC: NOT DETECTED [IU]/ML
HBV DNA SERPL NAA+PROBE-LOG IU: NORMAL {LOG_IU}/ML
PLA2R IGG SERPL QL IF: NORMAL

## 2023-11-24 ENCOUNTER — PHARMACY VISIT (OUTPATIENT)
Dept: PHARMACY | Facility: CLINIC | Age: 68
End: 2023-11-24
Payer: MEDICAID

## 2023-11-24 PROCEDURE — RXMED WILLOW AMBULATORY MEDICATION CHARGE

## 2023-11-25 LAB
ANCA AB PATTERN SER IF-IMP: NORMAL
ANCA IGG TITR SER IF: NORMAL {TITER}
MYELOPEROXIDASE AB SER-ACNC: 0 AU/ML (ref 0–19)
PROTEINASE3 AB SER-ACNC: 0 AU/ML (ref 0–19)

## 2023-11-25 NOTE — DISCHARGE SUMMARY
Discharge Diagnosis  ISIAH    Issues Requiring Follow-Up  - follow up final renal biopsy   - follow up with Dr. Alejandro (hematology)     Discharge Meds     Your medication list        START taking these medications        Instructions Last Dose Given Next Dose Due   diclofenac sodium 1 % gel gel  Commonly known as: Voltaren      Apply 1 Application topically 2 times a day as needed (back pain).       furosemide 20 mg tablet  Commonly known as: Lasix      Take 1 tablet (20 mg) by mouth once daily as needed (worsening leg swelling) for up to 30 doses.       lidocaine 4 % patch  Start taking on: November 23, 2023      Place 1 patch over 12 hours on the skin once daily for 10 days. Remove & discard patch within 12 hours or as directed by MD. Do not start before November 23, 2023.              CHANGE how you take these medications        Instructions Last Dose Given Next Dose Due   carvedilol 12.5 mg tablet  Commonly known as: Coreg  What changed:   medication strength  how much to take      Take 3 tablets (37.5 mg) by mouth 2 times a day.       FeroSuL tablet  Generic drug: ferrous sulfate (325 mg ferrous sulfate)  What changed: how much to take      Take 1 tablet by mouth 2 times a day.       hydrALAZINE 50 mg tablet  Commonly known as: Apresoline  What changed:   medication strength  how much to take  additional instructions      Take 1 tablet (50 mg) by mouth 3 times a day.       pravastatin 10 mg tablet  Commonly known as: Pravachol  What changed:   medication strength  how much to take      Take 1 tablet (10 mg) by mouth once daily at bedtime.       tacrolimus 0.1 % ointment  Commonly known as: Protopic  What changed:   Another medication with the same name was changed. Make sure you understand how and when to take each.  Another medication with the same name was removed. Continue taking this medication, and follow the directions you see here.      Apply topically 2 times a day.       Prograf 0.5 mg capsule  What  "changed:   when to take this  Another medication with the same name was removed. Continue taking this medication, and follow the directions you see here.      Take 2.5 mg by mouth every 12 hours.       cholecalciferol 25 MCG (1000 UT) capsule  Commonly known as: Vitamin D-3  What changed: medication strength      Take 2 capsules (50 mcg) by mouth once daily.              CONTINUE taking these medications        Instructions Last Dose Given Next Dose Due   amLODIPine 10 mg tablet  Commonly known as: Norvasc           azaTHIOprine 50 mg tablet  Commonly known as: Imuran      Take 0.5 tablets (25 mg) by mouth once daily.       cinacalcet 30 mg tablet  Commonly known as: Sensipar      Take 1 tablet (30 mg) by mouth once daily. Take with food or shortly afer a meal. Swallow tablet whole; do not break or divide.       docusate sodium 100 mg capsule  Commonly known as: Colace      TAKE 1 CAPSULE BY MOUTH TWO TIMES A DAY TO PREVENT CONSTIPATION       gabapentin 300 mg capsule  Commonly known as: Neurontin      Take 1 capsule (300 mg) by mouth 2 times a day.       insulin glargine 100 unit/mL injection  Commonly known as: Lantus      Inject 24 Units under the skin once daily. Take as directed per insulin instructions.       insulin lispro 100 unit/mL injection  Commonly known as: HumaLOG      Per sliding scale up to 30 units daily       loratadine 10 mg tablet  Commonly known as: Claritin      Take 1 tablet (10 mg) by mouth once daily. Do not start before October 21, 2023.       losartan 25 mg tablet  Commonly known as: Cozaar           ondansetron ODT 4 mg disintegrating tablet  Commonly known as: Zofran-ODT           OneTouch Ultra Test strip  Generic drug: blood sugar diagnostic      100 strips 4 times a day before meals.       predniSONE 5 mg tablet  Commonly known as: Deltasone      Take 2 tablets (10 mg) by mouth once daily in the morning.       TechLITE Pen Needle 32 gauge x 5/32\" needle  Generic drug: pen needle, " diabetic           Unilet Super Thin Lancets 30 gauge misc  Generic drug: lancets      USE TO TEST BLOOD SUGAR THREE TIMES A DAY              STOP taking these medications      acetaminophen 325 mg tablet  Commonly known as: Tylenol        Easy Touch Alcohol Prep Pads pads, medicated  Generic drug: alcohol swabs                  Where to Get Your Medications        [facility] [address], [place], [facility]  [address] [place] [address]      Phone: [phone]   Unilet Super Thin Lancets 30 gauge misc       These medications were sent to [facility]  [id number], [place] [facility] [address]      Hours: 8AM to 6PM Mon-Fri, 8AM to 4PM Sat, 9AM to 1PM Sun Phone: [phone]   carvedilol 12.5 mg tablet  cholecalciferol 25 MCG (1000 UT) capsule  diclofenac sodium 1 % gel gel  FeroSuL tablet  furosemide 20 mg tablet  hydrALAZINE 50 mg tablet  lidocaine 4 % patch  pravastatin 10 mg tablet       Information about where to get these medications is not yet available    Ask your nurse or doctor about these medications  Prograf 0.5 mg capsule         Test Results Pending At Discharge  Pending Labs       Order Current Status    ANCA-Associated Vasculitis Profile (ANCA,MPO,PR3) In process    Surgical Pathology Exam In process    Extra Tubes Preliminary result    Sterile Cup Preliminary result            Hospital Course  Manolo Bashir is a 68 y.o. male with a PMHx of ESRD s/p renal transplant (1992, 2013 (left kidney)), HTN, TIIDM, and prior HCV presenting with several days of anuria, dysuria, 9/10 sharp back pain radiating to abdomen, and LE swelling.     Pt otherwise denied fevers, chills, chest pain, SOB, diarrhea, constipation, hematuria, or nausea/vomiting.    ED Course:  -/85, 97 RA, 74 HR, T 36.8, 16 rr  -20 hydral then 25 hydralazine given for BP control  -POCUS showed no hydronephrosis and decompressed bladder  -CTA/P showed mild  fat stranding adjacent to the transplant kidney in the setting of anasarca. Correlate clinical evidence of pyelonephritis.  -Pt started on IV ceftriaxone     Pt noted improved urine output in ED prior to lasix administration.    On admission, nephrology transplant team consulted for recommendations. Pt started on IV Lasix 80 for diuresis with output of 600 mL. Biopsy planned for pt on 11/20 with possibility of MGUS related ISIAH. Pt sent for skeletal survey as well for back pain, which ruled out lytic lesions.     Per nephrology, was able to restart azathioprine and home losartan. 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 Creatinine remained stable. Pt received continued IV diuresis per nephrology. Pt resumed on losartan. Creatinine slightly elevated the day of discharge but renal was ok discharging the patient with follow up . No changes made in immunosuppression. Carvedilol increased to 37.5mg BID, hydralazine increased to 50mg TID. Blood pressure better controlled. Pravastain renally dosed from 40mg daily to 10mg daily. Transplant nephrology recommending 20mg PO furosemide PRN upon discharge.  Pt to have labs drawn 11/27 and follow up with Renal Transplant in the next 2-3 weeks post-discharge. Pt discharged home in stable condition.         Outpatient Follow-Up  Future Appointments   Date Time Provider Department Center   11/27/2023  3:15 PM Kurtis Jc MD [id number] [facility]   11/28/2023 10:30 AM Elías Penn APRN-CNP [id number] [facility]   2/28/2024 10:00 AM Estella Quinonez MD [id number] [facility]   4/16/2024 10:20 AM TXP KIDNEY PROVIDER [id number] [facility]   10/16/2024 10:30 AM Vinicius Araiza MD [id number]         Edward Herman MD

## 2023-11-28 ENCOUNTER — OFFICE VISIT (OUTPATIENT)
Dept: HEMATOLOGY/ONCOLOGY | Facility: HOSPITAL | Age: 68
End: 2023-11-28
Payer: COMMERCIAL

## 2023-11-28 ENCOUNTER — LAB (OUTPATIENT)
Dept: LAB | Facility: HOSPITAL | Age: 68
End: 2023-11-28
Payer: COMMERCIAL

## 2023-11-28 VITALS
SYSTOLIC BLOOD PRESSURE: 162 MMHG | WEIGHT: 187.61 LBS | DIASTOLIC BLOOD PRESSURE: 85 MMHG | TEMPERATURE: 97.5 F | RESPIRATION RATE: 17 BRPM | OXYGEN SATURATION: 100 % | BODY MASS INDEX: 28.53 KG/M2 | HEART RATE: 77 BPM

## 2023-11-28 DIAGNOSIS — D47.2 MGUS (MONOCLONAL GAMMOPATHY OF UNKNOWN SIGNIFICANCE): ICD-10-CM

## 2023-11-28 DIAGNOSIS — Z94.0 KIDNEY REPLACED BY TRANSPLANT (HHS-HCC): ICD-10-CM

## 2023-11-28 DIAGNOSIS — E87.70 HYPERVOLEMIA, UNSPECIFIED HYPERVOLEMIA TYPE: ICD-10-CM

## 2023-11-28 DIAGNOSIS — D47.2 MGUS (MONOCLONAL GAMMOPATHY OF UNKNOWN SIGNIFICANCE): Primary | ICD-10-CM

## 2023-11-28 DIAGNOSIS — I15.9 SECONDARY HYPERTENSION: ICD-10-CM

## 2023-11-28 LAB
ALBUMIN SERPL BCP-MCNC: 2.8 G/DL (ref 3.4–5)
ALP SERPL-CCNC: 68 U/L (ref 33–136)
ALT SERPL W P-5'-P-CCNC: 36 U/L (ref 10–52)
ANION GAP SERPL CALC-SCNC: 11 MMOL/L (ref 10–20)
AST SERPL W P-5'-P-CCNC: 39 U/L (ref 9–39)
BASOPHILS # BLD AUTO: 0.02 X10*3/UL (ref 0–0.1)
BASOPHILS NFR BLD AUTO: 0.3 %
BILIRUB SERPL-MCNC: 0.4 MG/DL (ref 0–1.2)
BUN SERPL-MCNC: 54 MG/DL (ref 6–23)
CALCIUM SERPL-MCNC: 8.8 MG/DL (ref 8.6–10.6)
CHLORIDE SERPL-SCNC: 115 MMOL/L (ref 98–107)
CO2 SERPL-SCNC: 25 MMOL/L (ref 21–32)
CREAT SERPL-MCNC: 3.11 MG/DL (ref 0.5–1.3)
EOSINOPHIL # BLD AUTO: 0.05 X10*3/UL (ref 0–0.7)
EOSINOPHIL NFR BLD AUTO: 0.9 %
ERYTHROCYTE [DISTWIDTH] IN BLOOD BY AUTOMATED COUNT: 15.2 % (ref 11.5–14.5)
GFR SERPL CREATININE-BSD FRML MDRD: 21 ML/MIN/1.73M*2
GLUCOSE SERPL-MCNC: 72 MG/DL (ref 74–99)
HCT VFR BLD AUTO: 31.5 % (ref 41–52)
HGB BLD-MCNC: 10.5 G/DL (ref 13.5–17.5)
IGA SERPL-MCNC: 640 MG/DL (ref 70–400)
IGG SERPL-MCNC: 957 MG/DL (ref 700–1600)
IGM SERPL-MCNC: 25 MG/DL (ref 40–230)
IMM GRANULOCYTES # BLD AUTO: 0.06 X10*3/UL (ref 0–0.7)
IMM GRANULOCYTES NFR BLD AUTO: 1 % (ref 0–0.9)
LDH SERPL L TO P-CCNC: 238 U/L (ref 84–246)
LYMPHOCYTES # BLD AUTO: 0.84 X10*3/UL (ref 1.2–4.8)
LYMPHOCYTES NFR BLD AUTO: 14.6 %
MCH RBC QN AUTO: 28.8 PG (ref 26–34)
MCHC RBC AUTO-ENTMCNC: 33.3 G/DL (ref 32–36)
MCV RBC AUTO: 87 FL (ref 80–100)
MONOCYTES # BLD AUTO: 0.61 X10*3/UL (ref 0.1–1)
MONOCYTES NFR BLD AUTO: 10.6 %
NEUTROPHILS # BLD AUTO: 4.19 X10*3/UL (ref 1.2–7.7)
NEUTROPHILS NFR BLD AUTO: 72.6 %
NRBC BLD-RTO: 0 /100 WBCS (ref 0–0)
PLATELET # BLD AUTO: 98 X10*3/UL (ref 150–450)
POTASSIUM SERPL-SCNC: 4.2 MMOL/L (ref 3.5–5.3)
PROT SERPL-MCNC: 5.1 G/DL (ref 6.4–8.2)
PROT SERPL-MCNC: 5.3 G/DL (ref 6.4–8.2)
RBC # BLD AUTO: 3.64 X10*6/UL (ref 4.5–5.9)
SODIUM SERPL-SCNC: 147 MMOL/L (ref 136–145)
WBC # BLD AUTO: 5.8 X10*3/UL (ref 4.4–11.3)

## 2023-11-28 PROCEDURE — 3008F BODY MASS INDEX DOCD: CPT

## 2023-11-28 PROCEDURE — 83010 ASSAY OF HAPTOGLOBIN QUANT: CPT

## 2023-11-28 PROCEDURE — 1036F TOBACCO NON-USER: CPT

## 2023-11-28 PROCEDURE — 84155 ASSAY OF PROTEIN SERUM: CPT | Mod: 59

## 2023-11-28 PROCEDURE — 82784 ASSAY IGA/IGD/IGG/IGM EACH: CPT

## 2023-11-28 PROCEDURE — 1111F DSCHRG MED/CURRENT MED MERGE: CPT

## 2023-11-28 PROCEDURE — 4010F ACE/ARB THERAPY RXD/TAKEN: CPT

## 2023-11-28 PROCEDURE — 1159F MED LIST DOCD IN RCRD: CPT

## 2023-11-28 PROCEDURE — 1126F AMNT PAIN NOTED NONE PRSNT: CPT

## 2023-11-28 PROCEDURE — 99215 OFFICE O/P EST HI 40 MIN: CPT

## 2023-11-28 PROCEDURE — 86334 IMMUNOFIX E-PHORESIS SERUM: CPT

## 2023-11-28 PROCEDURE — 3048F LDL-C <100 MG/DL: CPT

## 2023-11-28 PROCEDURE — 3077F SYST BP >= 140 MM HG: CPT

## 2023-11-28 PROCEDURE — 83615 LACTATE (LD) (LDH) ENZYME: CPT

## 2023-11-28 PROCEDURE — 1160F RVW MEDS BY RX/DR IN RCRD: CPT

## 2023-11-28 PROCEDURE — 84165 PROTEIN E-PHORESIS SERUM: CPT | Performed by: STUDENT IN AN ORGANIZED HEALTH CARE EDUCATION/TRAINING PROGRAM

## 2023-11-28 PROCEDURE — 86320 SERUM IMMUNOELECTROPHORESIS: CPT | Performed by: STUDENT IN AN ORGANIZED HEALTH CARE EDUCATION/TRAINING PROGRAM

## 2023-11-28 PROCEDURE — 36415 COLL VENOUS BLD VENIPUNCTURE: CPT

## 2023-11-28 PROCEDURE — 3044F HG A1C LEVEL LT 7.0%: CPT

## 2023-11-28 PROCEDURE — 99417 PROLNG OP E/M EACH 15 MIN: CPT

## 2023-11-28 PROCEDURE — 83521 IG LIGHT CHAINS FREE EACH: CPT

## 2023-11-28 PROCEDURE — 3066F NEPHROPATHY DOC TX: CPT

## 2023-11-28 PROCEDURE — 85025 COMPLETE CBC W/AUTO DIFF WBC: CPT

## 2023-11-28 PROCEDURE — 84155 ASSAY OF PROTEIN SERUM: CPT

## 2023-11-28 PROCEDURE — 99215 OFFICE O/P EST HI 40 MIN: CPT | Mod: 25

## 2023-11-28 PROCEDURE — 84075 ASSAY ALKALINE PHOSPHATASE: CPT

## 2023-11-28 PROCEDURE — 3079F DIAST BP 80-89 MM HG: CPT

## 2023-11-28 PROCEDURE — 3061F NEG MICROALBUMINURIA REV: CPT

## 2023-11-28 PROCEDURE — 82374 ASSAY BLOOD CARBON DIOXIDE: CPT

## 2023-11-28 ASSESSMENT — PAIN SCALES - GENERAL: PAINLEVEL: 0-NO PAIN

## 2023-11-28 ASSESSMENT — ENCOUNTER SYMPTOMS
PSYCHIATRIC NEGATIVE: 1
EYES NEGATIVE: 1
NEUROLOGICAL NEGATIVE: 1
CONSTITUTIONAL NEGATIVE: 1
HEMATOLOGIC/LYMPHATIC NEGATIVE: 1
RESPIRATORY NEGATIVE: 1
GASTROINTESTINAL NEGATIVE: 1

## 2023-11-28 NOTE — PROGRESS NOTES
Patient ID: Manolo Bashir is a 68 y.o. male.  Referring Physician: [place]  [id number] [place] [address]  Primary Care Provider: No Assigned PCP Generic Provider, MD    Date of Service:  11/28/2023    MGUS    Mr. Bashir is a 68 year old gentleman with complex medical history who is referred after recent hospitalization for abdominal pain for further evaluation of evolving pancytopenia.  History of anemia for several years treated with iron supplements, does not recall being told about abnormal blood counts until last few months.  He has not required transfusion support and denies any recent infections.  Upon reviewing his blood counts, he had worsening anemia (10-11 down to 7.4 g/dL) and platelets (200s down to 69K) over last 2-3 months.  He has had chronic lymphopenia but has not been neutropenic.  He complains predominantly of GI symptoms including abdominal pain, nausea, and vomiting for which he has been hospitalized 2x recently without clear etiology.     Workup:    SPEP (10/18/23): 0.2g/dL IgA Lambda M protein, 0.1g/dL IgG Kappa M protein  SFLC (10/19/23): 8.23mg/dL Kappa FLC, 6.40mg/dL Lambda FLC, FLC ratio 1.29  Immunoglobulins (10/20/23): IgA 523  Spot UPEP (11/16/23): Marked proteinuria, 2% IgA Lambda M protein, 1% IgG Lambda M protein    BMBx (10/26/23):    -- HYPERCELLULAR BONE MARROW (50%) WITH MATURING TRILINEAGE HEMATOPOIESIS AND INVOLVED (5%) BY PLASMA CELL NEOPLASM    FISH NEGATIVE for gain of 1q, hyperdiploidy of 3,7 and 11, rearrangement of IGH, and deletion of TP53     Osseous survey (11/19/23):  IMPRESSION:  1. No evidence of suspicious osteolytic lesion in the axial or  appendicular skeleton.  2. Large amount of lobulated soft tissue prominence in the radial  aspect of the left distal upper arm containing interrupted  calcifications. This may represent fistula for hemodialysis and  clinical correlation as well as physical examination suggested.  3. Prominent vascular  calcifications of the extremities    Medical History:  ESRD s/p renal transplant (1992, 2013 (left kidney))  HTN  DM 2  HCV      ASSESSMENT and PLAN:    MGUS:  - 3 M spikes found on workup for worsening pancytopenias: 0.2g/dL IgA Lambda, 0.1g/dL IgG Lambda, 0.1g/dL IgG Kappa  - Both LC elevated w/ normal FLC ratio  - IgA 523  - BMBx showed 5% plasma cells, consistent w/ MGUS  - Osseous survey negative  - Repeat labs obtained today    Pancytopenia:  - Longstanding history of anemia (of chronic disease?), taking oral Iron  - Recent decrease in both Hgb and Plts  - Improving: Hgb up to 10.5 (around baseline), Plts up to 98    Renal:  - S/p L kidney transplant  - sCr increasing lately, s/p recent kidney biopsy  -  Prelim biopsy results showed proliferative GN with no signs of multiple myeloma   - Notes decreased urine output    Cardiac:  - Pt notes overall feeling of bloating  - HTN (managed by nephrology)  - Recent elevation in BNP  - Echo showed EF 60-65%  - Referred to cardiology for proper workup   - Coreg 37.5mg BID, Hydralazine 50mg TID  - Pravastatin 10mg daily  - Lasix 20mg PRN       SUBJECTIVE:  History of Present Illness:  Mr. Bashir presents to clinic 11/28/23 to establish care with me for his MGUS.    Overall he is doing okay.    Had another admission for difficulty urinating with swelling. He notes the swelling has been occurring since around the time of his bone marrow biopsy. Overall feels swollen.           Review of Systems   Constitutional: Negative.    HENT: Negative.     Eyes: Negative.    Respiratory: Negative.     Cardiovascular:  Positive for leg swelling.   Gastrointestinal: Negative.    Genitourinary:  Positive for decreased urine volume.   Skin: Negative.    Allergic/Immunologic: Positive for immunocompromised state.   Neurological: Negative.    Hematological: Negative.    Psychiatric/Behavioral: Negative.         OBJECTIVE:  KPS: Karnofsky Score: 80 - Normal activity with effort; some signs or  symptoms of disease   VS:  /85   Pulse 77   Temp 36.4 °C (97.5 °F)   Resp 17   Wt 85.1 kg (187 lb 9.8 oz)   SpO2 100%   BMI 28.53 kg/m²   BSA: 2.02 meters squared    Physical Exam  Constitutional:       Appearance: Normal appearance. He is normal weight.   HENT:      Head: Normocephalic and atraumatic.      Nose: Nose normal.      Mouth/Throat:      Mouth: Mucous membranes are moist.      Pharynx: Oropharynx is clear.   Eyes:      Extraocular Movements: Extraocular movements intact.      Conjunctiva/sclera: Conjunctivae normal.      Pupils: Pupils are equal, round, and reactive to light.   Cardiovascular:      Rate and Rhythm: Normal rate and regular rhythm.      Pulses: Normal pulses.      Heart sounds: Normal heart sounds.   Pulmonary:      Effort: Pulmonary effort is normal.      Breath sounds: Normal breath sounds.   Abdominal:      General: Abdomen is flat. Bowel sounds are normal.      Palpations: Abdomen is soft.   Musculoskeletal:         General: Swelling present. Normal range of motion.      Cervical back: Normal range of motion and neck supple.   Skin:     General: Skin is warm and dry.   Neurological:      General: No focal deficit present.      Mental Status: He is alert and oriented to person, place, and time. Mental status is at baseline.   Psychiatric:         Mood and Affect: Mood normal.         Behavior: Behavior normal.         Thought Content: Thought content normal.         Judgment: Judgment normal.       Laboratory:  The pertinent laboratory results were reviewed and discussed with the patient.    Lab Results   Component Value Date    WBC 5.8 11/28/2023    HCT 31.5 (L) 11/28/2023    HGB 10.5 (L) 11/28/2023    PLT 98 (L) 11/28/2023    K 4.2 11/28/2023    CALCIUM 8.8 11/28/2023     (H) 11/28/2023    MG 1.70 11/22/2023    ALT 36 11/28/2023    AST 39 11/28/2023    BUN 54 (H) 11/28/2023    CREATININE 3.11 (H) 11/28/2023    PHOS 2.8 11/22/2023    KAPPA 10.75 (H) 11/17/2023     LAMBDA 6.84 (H) 11/17/2023    KAPLS 1.57 11/17/2023    SPEP  10/18/2023     Aberrant bands detected. See immunofixation.    Hypoalbuminemia.       IEPIN  11/18/2023     Known monoclonal IgA lambda at 76.5 mg/24 hours and monoclonal IgG lambda at 47.1 mg/24 hours. Last detected on 11/16/23 at 2.0% and 1.0% of total urine protein, respectively.    IGG 1,030 10/20/2023    IGM 20 (L) 10/20/2023     (H) 10/20/2023      Note: for a comprehensive list of the patient's lab results, access the Results Review activity.    RTC:  3 months virtual w/ labs prior     ANÍBAL Scott-CNP

## 2023-11-29 LAB
HAPTOGLOB SERPL-MCNC: <10 MG/DL (ref 37–246)
KAPPA LC SERPL-MCNC: 11.44 MG/DL (ref 0.33–1.94)
KAPPA LC/LAMBDA SER: 1.3 {RATIO} (ref 0.26–1.65)
LAB AP ASR DISCLAIMER: NORMAL
LABORATORY COMMENT REPORT: NORMAL
LAMBDA LC SERPL-MCNC: 8.8 MG/DL (ref 0.57–2.63)
PATH REPORT.COMMENTS IMP SPEC: NORMAL
PATH REPORT.FINAL DX SPEC: NORMAL
PATH REPORT.GROSS SPEC: NORMAL
PATH REPORT.MICROSCOPIC SPEC OTHER STN: NORMAL
PATH REPORT.RELEVANT HX SPEC: NORMAL
PATH REPORT.TOTAL CANCER: NORMAL

## 2023-11-29 RX ORDER — PEN NEEDLE, DIABETIC 30 GX3/16"
1 NEEDLE, DISPOSABLE MISCELLANEOUS 3 TIMES DAILY
Qty: 100 EACH | Refills: 11 | Status: ON HOLD | OUTPATIENT
Start: 2023-11-29 | End: 2024-03-12 | Stop reason: SDUPTHER

## 2023-11-29 RX ORDER — LANCETS
1 EACH MISCELLANEOUS 3 TIMES DAILY
Qty: 100 EACH | Refills: 3 | Status: SHIPPED | OUTPATIENT
Start: 2023-11-29

## 2023-11-30 DIAGNOSIS — Z94.0 KIDNEY REPLACED BY TRANSPLANT (HHS-HCC): Primary | ICD-10-CM

## 2023-12-05 ENCOUNTER — DOCUMENTATION (OUTPATIENT)
Dept: PHYSICAL THERAPY | Facility: HOSPITAL | Age: 68
End: 2023-12-05
Payer: COMMERCIAL

## 2023-12-05 LAB
ALBUMIN: 2.7 G/DL (ref 3.4–5)
ALPHA 1 GLOBULIN: 0.3 G/DL (ref 0.2–0.6)
ALPHA 2 GLOBULIN: 0.5 G/DL (ref 0.4–1.1)
BETA GLOBULIN: 1 G/DL (ref 0.5–1.2)
GAMMA GLOBULIN: 0.6 G/DL (ref 0.5–1.4)
IMMUNOFIXATION COMMENT: ABNORMAL
M-PROTEIN 1: 0.2 G/DL
M-PROTEIN 2: 0.1 G/DL
M-PROTEIN 3: 0.1 G/DL
PATH REVIEW - SERUM IMMUNOFIXATION: ABNORMAL
PATH REVIEW-SERUM PROTEIN ELECTROPHORESIS: ABNORMAL
PROTEIN ELECTROPHORESIS COMMENT: ABNORMAL

## 2023-12-05 NOTE — PROGRESS NOTES
Physical Therapy    Discharge Summary    Name: Manolo Bashir  MRN: [id number]  : 1955  Date: 23    Discharge Summary: PT    Discharge Information: Date of evaluation 23 and Number of attended visits 1      Rehab Discharge Reason: Failed to schedule and/or keep follow-up appointment(s)

## 2023-12-07 PROCEDURE — RXMED WILLOW AMBULATORY MEDICATION CHARGE

## 2023-12-11 ENCOUNTER — PHARMACY VISIT (OUTPATIENT)
Dept: PHARMACY | Facility: CLINIC | Age: 68
End: 2023-12-11
Payer: MEDICAID

## 2023-12-12 ENCOUNTER — APPOINTMENT (OUTPATIENT)
Dept: PRIMARY CARE | Facility: CLINIC | Age: 68
End: 2023-12-12
Payer: COMMERCIAL

## 2023-12-12 PROCEDURE — RXMED WILLOW AMBULATORY MEDICATION CHARGE

## 2023-12-12 RX ORDER — CINACALCET 30 MG/1
30 TABLET, FILM COATED ORAL DAILY
Qty: 30 TABLET | Refills: 0 | Status: SHIPPED | OUTPATIENT
Start: 2023-12-12 | End: 2023-12-21 | Stop reason: HOSPADM

## 2023-12-13 LAB — HOLD SPECIMEN: NORMAL

## 2023-12-15 NOTE — PROGRESS NOTES
I was present during all key portions of visit including history, exam, discussion/plan and/or procedures and directly supervised our resident during all portions of the visit, follow up care, medications and more    MD Vinicius De La Vega MD

## 2023-12-16 ENCOUNTER — ANCILLARY PROCEDURE (OUTPATIENT)
Dept: EMERGENCY MEDICINE | Facility: HOSPITAL | Age: 68
End: 2023-12-16
Payer: COMMERCIAL

## 2023-12-16 ENCOUNTER — HOSPITAL ENCOUNTER (INPATIENT)
Facility: HOSPITAL | Age: 68
LOS: 5 days | Discharge: HOME | End: 2023-12-21
Attending: EMERGENCY MEDICINE | Admitting: STUDENT IN AN ORGANIZED HEALTH CARE EDUCATION/TRAINING PROGRAM
Payer: COMMERCIAL

## 2023-12-16 ENCOUNTER — APPOINTMENT (OUTPATIENT)
Dept: RADIOLOGY | Facility: HOSPITAL | Age: 68
End: 2023-12-16
Payer: COMMERCIAL

## 2023-12-16 DIAGNOSIS — K59.00 CONSTIPATION, UNSPECIFIED CONSTIPATION TYPE: ICD-10-CM

## 2023-12-16 DIAGNOSIS — E87.70 FLUID OVERLOAD, UNSPECIFIED: Primary | ICD-10-CM

## 2023-12-16 DIAGNOSIS — D84.9 IMMUNOSUPPRESSION (MULTI): ICD-10-CM

## 2023-12-16 DIAGNOSIS — Z94.0 KIDNEY REPLACED BY TRANSPLANT (HHS-HCC): ICD-10-CM

## 2023-12-16 DIAGNOSIS — E55.9 VITAMIN D DEFICIENCY: ICD-10-CM

## 2023-12-16 DIAGNOSIS — E21.2 TERTIARY HYPERPARATHYROIDISM (MULTI): ICD-10-CM

## 2023-12-16 DIAGNOSIS — I15.9 SECONDARY HYPERTENSION: ICD-10-CM

## 2023-12-16 LAB
ALBUMIN SERPL BCP-MCNC: 2.6 G/DL (ref 3.4–5)
ALP SERPL-CCNC: 55 U/L (ref 33–136)
ALT SERPL W P-5'-P-CCNC: 13 U/L (ref 10–52)
ANION GAP BLDV CALCULATED.4IONS-SCNC: 6 MMOL/L (ref 10–25)
ANION GAP SERPL CALC-SCNC: 10 MMOL/L (ref 10–20)
APPEARANCE UR: CLEAR
AST SERPL W P-5'-P-CCNC: 17 U/L (ref 9–39)
BASE EXCESS BLDV CALC-SCNC: -0.7 MMOL/L (ref -2–3)
BASOPHILS # BLD AUTO: 0.02 X10*3/UL (ref 0–0.1)
BASOPHILS NFR BLD AUTO: 0.5 %
BILIRUB SERPL-MCNC: 0.3 MG/DL (ref 0–1.2)
BILIRUB UR STRIP.AUTO-MCNC: NEGATIVE MG/DL
BNP SERPL-MCNC: 298 PG/ML (ref 0–99)
BODY TEMPERATURE: 37 DEGREES CELSIUS
BUN SERPL-MCNC: 39 MG/DL (ref 6–23)
CA-I BLDV-SCNC: 1.4 MMOL/L (ref 1.1–1.33)
CALCIUM SERPL-MCNC: 9.1 MG/DL (ref 8.6–10.6)
CARDIAC TROPONIN I PNL SERPL HS: 46 NG/L (ref 0–53)
CHLORIDE BLDV-SCNC: 114 MMOL/L (ref 98–107)
CHLORIDE SERPL-SCNC: 114 MMOL/L (ref 98–107)
CHLORIDE UR-SCNC: 151 MMOL/L
CHLORIDE/CREATININE (MMOL/G) IN URINE: 526 MMOL/G CREAT (ref 23–275)
CO2 SERPL-SCNC: 24 MMOL/L (ref 21–32)
COLOR UR: ABNORMAL
CREAT SERPL-MCNC: 3.11 MG/DL (ref 0.5–1.3)
CREAT UR-MCNC: 28.7 MG/DL (ref 20–370)
EOSINOPHIL # BLD AUTO: 0.1 X10*3/UL (ref 0–0.7)
EOSINOPHIL NFR BLD AUTO: 2.5 %
ERYTHROCYTE [DISTWIDTH] IN BLOOD BY AUTOMATED COUNT: 14.6 % (ref 11.5–14.5)
GFR SERPL CREATININE-BSD FRML MDRD: 21 ML/MIN/1.73M*2
GLUCOSE BLD MANUAL STRIP-MCNC: 172 MG/DL (ref 74–99)
GLUCOSE BLD MANUAL STRIP-MCNC: 93 MG/DL (ref 74–99)
GLUCOSE BLDV-MCNC: 116 MG/DL (ref 74–99)
GLUCOSE SERPL-MCNC: 109 MG/DL (ref 74–99)
GLUCOSE UR STRIP.AUTO-MCNC: ABNORMAL MG/DL
HCO3 BLDV-SCNC: 24.9 MMOL/L (ref 22–26)
HCT VFR BLD AUTO: 31.1 % (ref 41–52)
HCT VFR BLD EST: 32 % (ref 41–52)
HGB BLD-MCNC: 10.3 G/DL (ref 13.5–17.5)
HGB BLDV-MCNC: 10.7 G/DL (ref 13.5–17.5)
HOLD SPECIMEN: NORMAL
IMM GRANULOCYTES # BLD AUTO: 0.03 X10*3/UL (ref 0–0.7)
IMM GRANULOCYTES NFR BLD AUTO: 0.7 % (ref 0–0.9)
KETONES UR STRIP.AUTO-MCNC: NEGATIVE MG/DL
LACTATE BLDV-SCNC: 1.1 MMOL/L (ref 0.4–2)
LEUKOCYTE ESTERASE UR QL STRIP.AUTO: NEGATIVE
LYMPHOCYTES # BLD AUTO: 0.76 X10*3/UL (ref 1.2–4.8)
LYMPHOCYTES NFR BLD AUTO: 19 %
MAGNESIUM SERPL-MCNC: 1.97 MG/DL (ref 1.6–2.4)
MCH RBC QN AUTO: 28.5 PG (ref 26–34)
MCHC RBC AUTO-ENTMCNC: 33.1 G/DL (ref 32–36)
MCV RBC AUTO: 86 FL (ref 80–100)
MONOCYTES # BLD AUTO: 0.35 X10*3/UL (ref 0.1–1)
MONOCYTES NFR BLD AUTO: 8.7 %
MUCOUS THREADS #/AREA URNS AUTO: NORMAL /LPF
NEUTROPHILS # BLD AUTO: 2.75 X10*3/UL (ref 1.2–7.7)
NEUTROPHILS NFR BLD AUTO: 68.6 %
NITRITE UR QL STRIP.AUTO: NEGATIVE
NRBC BLD-RTO: 0 /100 WBCS (ref 0–0)
OXYHGB MFR BLDV: 88 % (ref 45–75)
PCO2 BLDV: 44 MM HG (ref 41–51)
PH BLDV: 7.36 PH (ref 7.33–7.43)
PH UR STRIP.AUTO: 5 [PH]
PHOSPHATE SERPL-MCNC: 3.1 MG/DL (ref 2.5–4.9)
PLATELET # BLD AUTO: 109 X10*3/UL (ref 150–450)
PO2 BLDV: 60 MM HG (ref 35–45)
POTASSIUM BLDV-SCNC: 4.7 MMOL/L (ref 3.5–5.3)
POTASSIUM SERPL-SCNC: 4.5 MMOL/L (ref 3.5–5.3)
POTASSIUM UR-SCNC: 16 MMOL/L
POTASSIUM/CREAT UR-RTO: 56 MMOL/G CREAT
PROT SERPL-MCNC: 4.9 G/DL (ref 6.4–8.2)
PROT UR STRIP.AUTO-MCNC: ABNORMAL MG/DL
RBC # BLD AUTO: 3.62 X10*6/UL (ref 4.5–5.9)
RBC # UR STRIP.AUTO: ABNORMAL /UL
RBC #/AREA URNS AUTO: NORMAL /HPF
SAO2 % BLDV: 91 % (ref 45–75)
SODIUM BLDV-SCNC: 140 MMOL/L (ref 136–145)
SODIUM SERPL-SCNC: 143 MMOL/L (ref 136–145)
SODIUM UR-SCNC: 131 MMOL/L
SODIUM/CREAT UR-RTO: 456 MMOL/G CREAT
SP GR UR STRIP.AUTO: 1.01
SQUAMOUS #/AREA URNS AUTO: NORMAL /HPF
UROBILINOGEN UR STRIP.AUTO-MCNC: <2 MG/DL
WBC # BLD AUTO: 4 X10*3/UL (ref 4.4–11.3)
WBC #/AREA URNS AUTO: NORMAL /HPF

## 2023-12-16 PROCEDURE — 36415 COLL VENOUS BLD VENIPUNCTURE: CPT | Performed by: STUDENT IN AN ORGANIZED HEALTH CARE EDUCATION/TRAINING PROGRAM

## 2023-12-16 PROCEDURE — 2500000001 HC RX 250 WO HCPCS SELF ADMINISTERED DRUGS (ALT 637 FOR MEDICARE OP)

## 2023-12-16 PROCEDURE — 81003 URINALYSIS AUTO W/O SCOPE: CPT | Performed by: STUDENT IN AN ORGANIZED HEALTH CARE EDUCATION/TRAINING PROGRAM

## 2023-12-16 PROCEDURE — 99285 EMERGENCY DEPT VISIT HI MDM: CPT | Mod: 25,27 | Performed by: EMERGENCY MEDICINE

## 2023-12-16 PROCEDURE — 2500000004 HC RX 250 GENERAL PHARMACY W/ HCPCS (ALT 636 FOR OP/ED): Mod: SE | Performed by: STUDENT IN AN ORGANIZED HEALTH CARE EDUCATION/TRAINING PROGRAM

## 2023-12-16 PROCEDURE — 85025 COMPLETE CBC W/AUTO DIFF WBC: CPT | Performed by: STUDENT IN AN ORGANIZED HEALTH CARE EDUCATION/TRAINING PROGRAM

## 2023-12-16 PROCEDURE — 84100 ASSAY OF PHOSPHORUS: CPT | Performed by: STUDENT IN AN ORGANIZED HEALTH CARE EDUCATION/TRAINING PROGRAM

## 2023-12-16 PROCEDURE — 82947 ASSAY GLUCOSE BLOOD QUANT: CPT | Performed by: STUDENT IN AN ORGANIZED HEALTH CARE EDUCATION/TRAINING PROGRAM

## 2023-12-16 PROCEDURE — 2500000004 HC RX 250 GENERAL PHARMACY W/ HCPCS (ALT 636 FOR OP/ED)

## 2023-12-16 PROCEDURE — 2500000005 HC RX 250 GENERAL PHARMACY W/O HCPCS

## 2023-12-16 PROCEDURE — 82947 ASSAY GLUCOSE BLOOD QUANT: CPT

## 2023-12-16 PROCEDURE — 71046 X-RAY EXAM CHEST 2 VIEWS: CPT

## 2023-12-16 PROCEDURE — 1100000001 HC PRIVATE ROOM DAILY

## 2023-12-16 PROCEDURE — 71046 X-RAY EXAM CHEST 2 VIEWS: CPT | Mod: FOREIGN READ | Performed by: RADIOLOGY

## 2023-12-16 PROCEDURE — 83880 ASSAY OF NATRIURETIC PEPTIDE: CPT | Performed by: STUDENT IN AN ORGANIZED HEALTH CARE EDUCATION/TRAINING PROGRAM

## 2023-12-16 PROCEDURE — 83605 ASSAY OF LACTIC ACID: CPT

## 2023-12-16 PROCEDURE — 93005 ELECTROCARDIOGRAM TRACING: CPT

## 2023-12-16 PROCEDURE — 96374 THER/PROPH/DIAG INJ IV PUSH: CPT

## 2023-12-16 PROCEDURE — 84484 ASSAY OF TROPONIN QUANT: CPT | Performed by: STUDENT IN AN ORGANIZED HEALTH CARE EDUCATION/TRAINING PROGRAM

## 2023-12-16 PROCEDURE — 83735 ASSAY OF MAGNESIUM: CPT | Performed by: STUDENT IN AN ORGANIZED HEALTH CARE EDUCATION/TRAINING PROGRAM

## 2023-12-16 PROCEDURE — 99285 EMERGENCY DEPT VISIT HI MDM: CPT | Performed by: EMERGENCY MEDICINE

## 2023-12-16 PROCEDURE — 82570 ASSAY OF URINE CREATININE: CPT

## 2023-12-16 RX ORDER — DICLOFENAC SODIUM 10 MG/G
4 GEL TOPICAL 2 TIMES DAILY PRN
Status: DISCONTINUED | OUTPATIENT
Start: 2023-12-16 | End: 2023-12-21 | Stop reason: HOSPADM

## 2023-12-16 RX ORDER — FUROSEMIDE 10 MG/ML
40 INJECTION INTRAMUSCULAR; INTRAVENOUS ONCE
Status: COMPLETED | OUTPATIENT
Start: 2023-12-16 | End: 2023-12-16

## 2023-12-16 RX ORDER — ACETAMINOPHEN 325 MG/1
975 TABLET ORAL EVERY 6 HOURS PRN
Status: DISCONTINUED | OUTPATIENT
Start: 2023-12-16 | End: 2023-12-21 | Stop reason: HOSPADM

## 2023-12-16 RX ORDER — CHOLECALCIFEROL (VITAMIN D3) 25 MCG
1000 TABLET ORAL DAILY
Status: DISCONTINUED | OUTPATIENT
Start: 2023-12-16 | End: 2023-12-21 | Stop reason: HOSPADM

## 2023-12-16 RX ORDER — LORATADINE 10 MG/1
10 TABLET ORAL DAILY
Status: DISCONTINUED | OUTPATIENT
Start: 2023-12-17 | End: 2023-12-21 | Stop reason: HOSPADM

## 2023-12-16 RX ORDER — AMLODIPINE BESYLATE 10 MG/1
10 TABLET ORAL DAILY
Status: DISCONTINUED | OUTPATIENT
Start: 2023-12-16 | End: 2023-12-21 | Stop reason: HOSPADM

## 2023-12-16 RX ORDER — TRAMADOL HYDROCHLORIDE 50 MG/1
50 TABLET ORAL EVERY 8 HOURS PRN
Status: DISCONTINUED | OUTPATIENT
Start: 2023-12-16 | End: 2023-12-21 | Stop reason: HOSPADM

## 2023-12-16 RX ORDER — DOCUSATE SODIUM 100 MG/1
100 CAPSULE, LIQUID FILLED ORAL DAILY
Status: DISCONTINUED | OUTPATIENT
Start: 2023-12-16 | End: 2023-12-21 | Stop reason: HOSPADM

## 2023-12-16 RX ORDER — TACROLIMUS 1 MG/G
OINTMENT TOPICAL 2 TIMES DAILY
Status: DISCONTINUED | OUTPATIENT
Start: 2023-12-16 | End: 2023-12-21 | Stop reason: HOSPADM

## 2023-12-16 RX ORDER — CINACALCET 30 MG/1
30 TABLET, FILM COATED ORAL DAILY
Status: DISCONTINUED | OUTPATIENT
Start: 2023-12-17 | End: 2023-12-21 | Stop reason: HOSPADM

## 2023-12-16 RX ORDER — INSULIN LISPRO 100 [IU]/ML
0-10 INJECTION, SOLUTION INTRAVENOUS; SUBCUTANEOUS
Status: DISCONTINUED | OUTPATIENT
Start: 2023-12-16 | End: 2023-12-21 | Stop reason: HOSPADM

## 2023-12-16 RX ORDER — FUROSEMIDE 40 MG/1
20 TABLET ORAL DAILY PRN
Status: CANCELLED | OUTPATIENT
Start: 2023-12-16

## 2023-12-16 RX ORDER — LIDOCAINE 560 MG/1
1 PATCH PERCUTANEOUS; TOPICAL; TRANSDERMAL DAILY
Status: DISCONTINUED | OUTPATIENT
Start: 2023-12-16 | End: 2023-12-21 | Stop reason: HOSPADM

## 2023-12-16 RX ORDER — DEXTROSE 50 % IN WATER (D50W) INTRAVENOUS SYRINGE
25
Status: DISCONTINUED | OUTPATIENT
Start: 2023-12-16 | End: 2023-12-21 | Stop reason: HOSPADM

## 2023-12-16 RX ORDER — PRAVASTATIN SODIUM 20 MG/1
10 TABLET ORAL NIGHTLY
Status: DISCONTINUED | OUTPATIENT
Start: 2023-12-16 | End: 2023-12-21 | Stop reason: HOSPADM

## 2023-12-16 RX ORDER — PREDNISONE 5 MG/1
10 TABLET ORAL EVERY MORNING
Status: DISCONTINUED | OUTPATIENT
Start: 2023-12-17 | End: 2023-12-21 | Stop reason: HOSPADM

## 2023-12-16 RX ORDER — TACROLIMUS 0.5 MG/1
2.5 CAPSULE ORAL
Status: DISCONTINUED | OUTPATIENT
Start: 2023-12-16 | End: 2023-12-21 | Stop reason: HOSPADM

## 2023-12-16 RX ORDER — AZATHIOPRINE 50 MG/1
25 TABLET ORAL DAILY
Status: DISCONTINUED | OUTPATIENT
Start: 2023-12-17 | End: 2023-12-17

## 2023-12-16 RX ORDER — POLYETHYLENE GLYCOL 3350 17 G/17G
17 POWDER, FOR SOLUTION ORAL DAILY
Status: DISCONTINUED | OUTPATIENT
Start: 2023-12-16 | End: 2023-12-21 | Stop reason: HOSPADM

## 2023-12-16 RX ORDER — HEPARIN SODIUM 5000 [USP'U]/ML
5000 INJECTION, SOLUTION INTRAVENOUS; SUBCUTANEOUS EVERY 8 HOURS
Status: DISCONTINUED | OUTPATIENT
Start: 2023-12-16 | End: 2023-12-21 | Stop reason: HOSPADM

## 2023-12-16 RX ORDER — ONDANSETRON 4 MG/1
4 TABLET, ORALLY DISINTEGRATING ORAL EVERY 8 HOURS PRN
Status: DISCONTINUED | OUTPATIENT
Start: 2023-12-16 | End: 2023-12-21 | Stop reason: HOSPADM

## 2023-12-16 RX ORDER — FERROUS SULFATE 325(65) MG
65 TABLET ORAL 2 TIMES DAILY
Status: DISCONTINUED | OUTPATIENT
Start: 2023-12-16 | End: 2023-12-21 | Stop reason: HOSPADM

## 2023-12-16 RX ORDER — INSULIN GLARGINE 100 [IU]/ML
12 INJECTION, SOLUTION SUBCUTANEOUS NIGHTLY
Status: DISCONTINUED | OUTPATIENT
Start: 2023-12-17 | End: 2023-12-21 | Stop reason: HOSPADM

## 2023-12-16 RX ORDER — HYDRALAZINE HYDROCHLORIDE 25 MG/1
50 TABLET, FILM COATED ORAL 3 TIMES DAILY
Status: DISCONTINUED | OUTPATIENT
Start: 2023-12-16 | End: 2023-12-18

## 2023-12-16 RX ADMIN — TACROLIMUS: 1 OINTMENT TOPICAL at 20:50

## 2023-12-16 RX ADMIN — FUROSEMIDE 40 MG: 10 INJECTION, SOLUTION INTRAVENOUS at 20:20

## 2023-12-16 RX ADMIN — AMLODIPINE BESYLATE 10 MG: 10 TABLET ORAL at 20:20

## 2023-12-16 RX ADMIN — PRAVASTATIN SODIUM 10 MG: 20 TABLET ORAL at 20:50

## 2023-12-16 RX ADMIN — TRAMADOL HYDROCHLORIDE 50 MG: 50 TABLET, COATED ORAL at 20:51

## 2023-12-16 RX ADMIN — FUROSEMIDE 40 MG: 10 INJECTION, SOLUTION INTRAVENOUS at 12:53

## 2023-12-16 RX ADMIN — FERROUS SULFATE TAB 325 MG (65 MG ELEMENTAL FE) 1 TABLET: 325 (65 FE) TAB at 20:20

## 2023-12-16 RX ADMIN — TACROLIMUS 2.5 MG: 0.5 CAPSULE ORAL at 20:20

## 2023-12-16 RX ADMIN — CARVEDILOL 37.5 MG: 25 TABLET, FILM COATED ORAL at 20:19

## 2023-12-16 RX ADMIN — HYDRALAZINE HYDROCHLORIDE 50 MG: 25 TABLET, FILM COATED ORAL at 20:20

## 2023-12-16 RX ADMIN — Medication 1000 UNITS: at 20:20

## 2023-12-16 RX ADMIN — LIDOCAINE 1 PATCH: 4 PATCH TOPICAL at 20:49

## 2023-12-16 SDOH — SOCIAL STABILITY: SOCIAL INSECURITY: DOES ANYONE TRY TO KEEP YOU FROM HAVING/CONTACTING OTHER FRIENDS OR DOING THINGS OUTSIDE YOUR HOME?: NO

## 2023-12-16 SDOH — SOCIAL STABILITY: SOCIAL INSECURITY: WERE YOU ABLE TO COMPLETE ALL THE BEHAVIORAL HEALTH SCREENINGS?: YES

## 2023-12-16 SDOH — SOCIAL STABILITY: SOCIAL INSECURITY: ARE YOU OR HAVE YOU BEEN THREATENED OR ABUSED PHYSICALLY, EMOTIONALLY, OR SEXUALLY BY ANYONE?: NO

## 2023-12-16 SDOH — SOCIAL STABILITY: SOCIAL INSECURITY: HAS ANYONE EVER THREATENED TO HURT YOUR FAMILY OR YOUR PETS?: NO

## 2023-12-16 SDOH — SOCIAL STABILITY: SOCIAL INSECURITY: DO YOU FEEL ANYONE HAS EXPLOITED OR TAKEN ADVANTAGE OF YOU FINANCIALLY OR OF YOUR PERSONAL PROPERTY?: NO

## 2023-12-16 SDOH — SOCIAL STABILITY: SOCIAL INSECURITY: ARE THERE ANY APPARENT SIGNS OF INJURIES/BEHAVIORS THAT COULD BE RELATED TO ABUSE/NEGLECT?: NO

## 2023-12-16 SDOH — SOCIAL STABILITY: SOCIAL INSECURITY: DO YOU FEEL UNSAFE GOING BACK TO THE PLACE WHERE YOU ARE LIVING?: NO

## 2023-12-16 SDOH — SOCIAL STABILITY: SOCIAL INSECURITY: HAVE YOU HAD THOUGHTS OF HARMING ANYONE ELSE?: NO

## 2023-12-16 SDOH — SOCIAL STABILITY: SOCIAL INSECURITY: ABUSE: ADULT

## 2023-12-16 ASSESSMENT — ACTIVITIES OF DAILY LIVING (ADL)
TOILETING: INDEPENDENT
GROOMING: INDEPENDENT
HEARING - LEFT EAR: FUNCTIONAL
BATHING: INDEPENDENT
HEARING - RIGHT EAR: FUNCTIONAL
JUDGMENT_ADEQUATE_SAFELY_COMPLETE_DAILY_ACTIVITIES: YES
LACK_OF_TRANSPORTATION: NO
PATIENT'S MEMORY ADEQUATE TO SAFELY COMPLETE DAILY ACTIVITIES?: YES
ADEQUATE_TO_COMPLETE_ADL: YES
DRESSING YOURSELF: INDEPENDENT
FEEDING YOURSELF: INDEPENDENT
WALKS IN HOME: INDEPENDENT

## 2023-12-16 ASSESSMENT — COGNITIVE AND FUNCTIONAL STATUS - GENERAL
DAILY ACTIVITIY SCORE: 24
WALKING IN HOSPITAL ROOM: A LITTLE
PATIENT BASELINE BEDBOUND: NO
CLIMB 3 TO 5 STEPS WITH RAILING: A LITTLE
MOBILITY SCORE: 22

## 2023-12-16 ASSESSMENT — PAIN - FUNCTIONAL ASSESSMENT
PAIN_FUNCTIONAL_ASSESSMENT: 0-10
PAIN_FUNCTIONAL_ASSESSMENT: PAINAD (PAIN ASSESSMENT IN ADVANCED DEMENTIA SCALE)
PAIN_FUNCTIONAL_ASSESSMENT: 0-10

## 2023-12-16 ASSESSMENT — PAIN SCALES - GENERAL
PAINLEVEL_OUTOF10: 4
PAINLEVEL_OUTOF10: 10 - WORST POSSIBLE PAIN

## 2023-12-16 ASSESSMENT — COLUMBIA-SUICIDE SEVERITY RATING SCALE - C-SSRS
6. HAVE YOU EVER DONE ANYTHING, STARTED TO DO ANYTHING, OR PREPARED TO DO ANYTHING TO END YOUR LIFE?: NO
2. HAVE YOU ACTUALLY HAD ANY THOUGHTS OF KILLING YOURSELF?: NO
1. IN THE PAST MONTH, HAVE YOU WISHED YOU WERE DEAD OR WISHED YOU COULD GO TO SLEEP AND NOT WAKE UP?: NO

## 2023-12-16 ASSESSMENT — LIFESTYLE VARIABLES
HOW MANY STANDARD DRINKS CONTAINING ALCOHOL DO YOU HAVE ON A TYPICAL DAY: 1 OR 2
AUDIT-C TOTAL SCORE: 1
HOW OFTEN DO YOU HAVE A DRINK CONTAINING ALCOHOL: MONTHLY OR LESS
AUDIT-C TOTAL SCORE: 1
SKIP TO QUESTIONS 9-10: 1
HOW OFTEN DO YOU HAVE 6 OR MORE DRINKS ON ONE OCCASION: NEVER

## 2023-12-16 ASSESSMENT — PATIENT HEALTH QUESTIONNAIRE - PHQ9
SUM OF ALL RESPONSES TO PHQ9 QUESTIONS 1 & 2: 2
1. LITTLE INTEREST OR PLEASURE IN DOING THINGS: SEVERAL DAYS
2. FEELING DOWN, DEPRESSED OR HOPELESS: SEVERAL DAYS

## 2023-12-16 NOTE — ED PROVIDER NOTES
CC: Shortness of Breath (Hx of Kidney transplant)     HPI:  Manolo Bashir is a 68 y.o. male  with a PMH of ESRD s/p kidney transplant in 2013, pancytopenia, IDDM, HTN, prostate cancer, HCV, presenting to the ED due to fluid overload. Pt states that he as a 20 lbs wt gain since his last admission and his urine output has been poor. He states that he was sent home with PRN lasix however it hasn't been helpful. He states he feels uncomfortable with the swelling he has been having. He denies any SOB, CP, fevers, chills, nausea, vomiting, or other symptoms.    Limitations to History: none  Additional History provided by: N/A    External Records Reviewed:  Recent available ED and inpatient notes reviewed in EMR.  Reviewed inpatient notes from admission in late November    PMHx/PSHx:  Per HPI.   - has a past medical history of Chronic kidney disease, stage 3 unspecified (CMS/HCC) (09/26/2018), COVID-19 (06/18/2020), Diabetes (CMS/[facility]), HTN (hypertension), Other long term (current) drug therapy (07/20/2021), Personal history of other diseases of the circulatory system, Personal history of other infectious and parasitic diseases (08/17/2015), Polyp, colonic (08/17/2023), Primary osteoarthritis of both ankles (08/17/2023), Tubular adenoma of colon (08/17/2023), and Unspecified kidney failure (08/17/2016).  - has a past surgical history that includes Prostatectomy (10/11/2013); Ileostomy (04/25/2017); Other surgical history (04/21/2017); Ileostomy closure (08/17/2015); Other surgical history (08/17/2015); Other surgical history (08/17/2015); US guided percutaneous peritoneal or retroperitoneal fluid collection drainage (10/20/2022); transplant, kidney, open (1992); transplant, kidney, open (2013); and US guided percutaneous biopsy renal left (Left, 11/20/2023).    Medications:  Reviewed in EMR. See EMR for complete list of medications and doses.    Allergies:  Patient has no known allergies.    Social History:  - Tobacco:   reports that he has never smoked. He has never used smokeless tobacco.   - Alcohol:  reports current alcohol use.   - Illicit Drugs:  reports current drug use. Drug: Oxycodone.     ROS:  Per HPI.     ???????????????????????????????????????????????????????????????  Triage Vitals:  T 37.1 °C (98.8 °F)  HR 69  /85  RR 18  O2 98 % None (Room air)    Physical Exam  Vitals and nursing note reviewed.   Constitutional:       General: He is not in acute distress.     Appearance: He is well-developed.   HENT:      Head: Normocephalic and atraumatic.   Eyes:      Conjunctiva/sclera: Conjunctivae normal.   Cardiovascular:      Rate and Rhythm: Normal rate and regular rhythm.      Heart sounds: No murmur heard.  Pulmonary:      Effort: Pulmonary effort is normal. No respiratory distress.      Breath sounds: Normal breath sounds.   Abdominal:      Palpations: Abdomen is soft.      Tenderness: There is no abdominal tenderness.   Musculoskeletal:         General: No swelling.      Cervical back: Neck supple.      Right knee: Swelling present.      Left knee: Swelling present.      Right lower le+ Edema present.      Left lower le+ Edema present.   Skin:     General: Skin is warm and dry.      Capillary Refill: Capillary refill takes less than 2 seconds.   Neurological:      Mental Status: He is alert.   Psychiatric:         Mood and Affect: Mood normal.       ???????????????????????????????????????????????????????????????  ED Course:  Diagnoses as of 23 2312   Fluid overload, unspecified       EKG & Images:  Independently reviewed, See ED Course      MDM:  - The pt is a 69 yo M with a PMH of ESRD s/p kidney transplant in , pancytopenia, IDDM, HTN, prostate cancer, HCV, presenting to the ED due to fluid overload. He was recently admitted for the same reasons however at that time he was increased on his lasix to 20 mg PRN which he has been using. On exam, he is significantly fluid overloaded. He is however  oxygenating on room air and not having any SOB. Work up was done for ISIAH, and HF. Low suspicion for DVT as the swelling is bilateral and diffuse from the ankle to the upper thigh. Labs show increasing Cr to 3.11, minimally elevated BNP. This is more consistent with his kidney failure as opposed to heart failure. Patient was given 40 of IV lasix with some urinary output however minimal. As such recommending admission which he agrees to. Medicine was contacted and accepted.     Final diagnoses:   [E87.70] Fluid overload, unspecified         Social Determinants Limiting Care:  None identified    Disposition:  Admit to floor    Amanda Wasserman MD   Emergency Medicine Resident, PGY3  [facility]     Disclaimer: This note was dictated by speech recognition. Minor errors in transcription may be present    Procedures ? FONU2 last updated 12/19/2023 11:12 PM        Amanda Wasserman MD  Resident  12/19/23 [id number]

## 2023-12-16 NOTE — ED TRIAGE NOTES
SOB, C/O swelling to body.   3+ pitting edema to BLE and RUE. States he has weight gain of 30 lbs over last month.

## 2023-12-17 LAB
ALBUMIN SERPL BCP-MCNC: 2.5 G/DL (ref 3.4–5)
ALP SERPL-CCNC: 53 U/L (ref 33–136)
ALT SERPL W P-5'-P-CCNC: 12 U/L (ref 10–52)
ANION GAP SERPL CALC-SCNC: 10 MMOL/L (ref 10–20)
AST SERPL W P-5'-P-CCNC: 13 U/L (ref 9–39)
BASOPHILS # BLD AUTO: 0.01 X10*3/UL (ref 0–0.1)
BASOPHILS NFR BLD AUTO: 0.2 %
BILIRUB SERPL-MCNC: 0.3 MG/DL (ref 0–1.2)
BUN SERPL-MCNC: 38 MG/DL (ref 6–23)
CALCIUM SERPL-MCNC: 8.9 MG/DL (ref 8.6–10.6)
CHLORIDE SERPL-SCNC: 113 MMOL/L (ref 98–107)
CO2 SERPL-SCNC: 24 MMOL/L (ref 21–32)
CREAT SERPL-MCNC: 3.43 MG/DL (ref 0.5–1.3)
EOSINOPHIL # BLD AUTO: 0.13 X10*3/UL (ref 0–0.7)
EOSINOPHIL NFR BLD AUTO: 3.2 %
ERYTHROCYTE [DISTWIDTH] IN BLOOD BY AUTOMATED COUNT: 14.5 % (ref 11.5–14.5)
GFR SERPL CREATININE-BSD FRML MDRD: 19 ML/MIN/1.73M*2
GLUCOSE BLD MANUAL STRIP-MCNC: 111 MG/DL (ref 74–99)
GLUCOSE BLD MANUAL STRIP-MCNC: 214 MG/DL (ref 74–99)
GLUCOSE BLD MANUAL STRIP-MCNC: 247 MG/DL (ref 74–99)
GLUCOSE BLD MANUAL STRIP-MCNC: 51 MG/DL (ref 74–99)
GLUCOSE BLD MANUAL STRIP-MCNC: 75 MG/DL (ref 74–99)
GLUCOSE SERPL-MCNC: 55 MG/DL (ref 74–99)
HCT VFR BLD AUTO: 31.8 % (ref 41–52)
HGB BLD-MCNC: 10.4 G/DL (ref 13.5–17.5)
IMM GRANULOCYTES # BLD AUTO: 0.03 X10*3/UL (ref 0–0.7)
IMM GRANULOCYTES NFR BLD AUTO: 0.7 % (ref 0–0.9)
LYMPHOCYTES # BLD AUTO: 1.04 X10*3/UL (ref 1.2–4.8)
LYMPHOCYTES NFR BLD AUTO: 25.2 %
MAGNESIUM SERPL-MCNC: 1.89 MG/DL (ref 1.6–2.4)
MCH RBC QN AUTO: 28.2 PG (ref 26–34)
MCHC RBC AUTO-ENTMCNC: 32.7 G/DL (ref 32–36)
MCV RBC AUTO: 86 FL (ref 80–100)
MONOCYTES # BLD AUTO: 0.43 X10*3/UL (ref 0.1–1)
MONOCYTES NFR BLD AUTO: 10.4 %
NEUTROPHILS # BLD AUTO: 2.48 X10*3/UL (ref 1.2–7.7)
NEUTROPHILS NFR BLD AUTO: 60.3 %
NRBC BLD-RTO: 0 /100 WBCS (ref 0–0)
PLATELET # BLD AUTO: 116 X10*3/UL (ref 150–450)
POTASSIUM SERPL-SCNC: 4.1 MMOL/L (ref 3.5–5.3)
PROT SERPL-MCNC: 4.6 G/DL (ref 6.4–8.2)
RBC # BLD AUTO: 3.69 X10*6/UL (ref 4.5–5.9)
SODIUM SERPL-SCNC: 143 MMOL/L (ref 136–145)
TACROLIMUS BLD-MCNC: 7.4 NG/ML
WBC # BLD AUTO: 4.1 X10*3/UL (ref 4.4–11.3)

## 2023-12-17 PROCEDURE — 99222 1ST HOSP IP/OBS MODERATE 55: CPT | Performed by: INTERNAL MEDICINE

## 2023-12-17 PROCEDURE — 2500000001 HC RX 250 WO HCPCS SELF ADMINISTERED DRUGS (ALT 637 FOR MEDICARE OP)

## 2023-12-17 PROCEDURE — 80053 COMPREHEN METABOLIC PANEL: CPT

## 2023-12-17 PROCEDURE — 96372 THER/PROPH/DIAG INJ SC/IM: CPT

## 2023-12-17 PROCEDURE — 85025 COMPLETE CBC W/AUTO DIFF WBC: CPT

## 2023-12-17 PROCEDURE — 1100000001 HC PRIVATE ROOM DAILY

## 2023-12-17 PROCEDURE — 83735 ASSAY OF MAGNESIUM: CPT

## 2023-12-17 PROCEDURE — 82947 ASSAY GLUCOSE BLOOD QUANT: CPT

## 2023-12-17 PROCEDURE — 80197 ASSAY OF TACROLIMUS: CPT

## 2023-12-17 PROCEDURE — 2500000004 HC RX 250 GENERAL PHARMACY W/ HCPCS (ALT 636 FOR OP/ED)

## 2023-12-17 PROCEDURE — 2500000005 HC RX 250 GENERAL PHARMACY W/O HCPCS

## 2023-12-17 PROCEDURE — 99223 1ST HOSP IP/OBS HIGH 75: CPT

## 2023-12-17 PROCEDURE — 36415 COLL VENOUS BLD VENIPUNCTURE: CPT

## 2023-12-17 PROCEDURE — 2500000002 HC RX 250 W HCPCS SELF ADMINISTERED DRUGS (ALT 637 FOR MEDICARE OP, ALT 636 FOR OP/ED)

## 2023-12-17 RX ORDER — OXYCODONE HYDROCHLORIDE 5 MG/1
5 TABLET ORAL EVERY 6 HOURS PRN
Status: DISCONTINUED | OUTPATIENT
Start: 2023-12-17 | End: 2023-12-21 | Stop reason: HOSPADM

## 2023-12-17 RX ORDER — LOSARTAN POTASSIUM 25 MG/1
25 TABLET ORAL DAILY
Status: DISCONTINUED | OUTPATIENT
Start: 2023-12-17 | End: 2023-12-21 | Stop reason: HOSPADM

## 2023-12-17 RX ORDER — FUROSEMIDE 10 MG/ML
40 INJECTION INTRAMUSCULAR; INTRAVENOUS 2 TIMES DAILY
Status: DISCONTINUED | OUTPATIENT
Start: 2023-12-17 | End: 2023-12-21

## 2023-12-17 RX ORDER — AZATHIOPRINE 50 MG/1
50 TABLET ORAL DAILY
Status: DISCONTINUED | OUTPATIENT
Start: 2023-12-18 | End: 2023-12-21 | Stop reason: HOSPADM

## 2023-12-17 RX ORDER — ISOSORBIDE MONONITRATE 30 MG/1
30 TABLET, EXTENDED RELEASE ORAL DAILY
Status: DISCONTINUED | OUTPATIENT
Start: 2023-12-17 | End: 2023-12-21

## 2023-12-17 RX ADMIN — FERROUS SULFATE TAB 325 MG (65 MG ELEMENTAL FE) 1 TABLET: 325 (65 FE) TAB at 08:28

## 2023-12-17 RX ADMIN — INSULIN GLARGINE 12 UNITS: 100 INJECTION, SOLUTION SUBCUTANEOUS at 20:08

## 2023-12-17 RX ADMIN — ISOSORBIDE MONONITRATE 30 MG: 30 TABLET, EXTENDED RELEASE ORAL at 10:52

## 2023-12-17 RX ADMIN — FUROSEMIDE 40 MG: 10 INJECTION, SOLUTION INTRAVENOUS at 20:04

## 2023-12-17 RX ADMIN — HEPARIN SODIUM 5000 UNITS: 5000 INJECTION INTRAVENOUS; SUBCUTANEOUS at 08:29

## 2023-12-17 RX ADMIN — INSULIN LISPRO 4 UNITS: 100 INJECTION, SOLUTION INTRAVENOUS; SUBCUTANEOUS at 18:03

## 2023-12-17 RX ADMIN — AZATHIOPRINE 25 MG: 50 TABLET ORAL at 10:52

## 2023-12-17 RX ADMIN — TACROLIMUS: 1 OINTMENT TOPICAL at 09:00

## 2023-12-17 RX ADMIN — LIDOCAINE 1 PATCH: 4 PATCH TOPICAL at 08:29

## 2023-12-17 RX ADMIN — TACROLIMUS 2.5 MG: 0.5 CAPSULE ORAL at 20:04

## 2023-12-17 RX ADMIN — HEPARIN SODIUM 5000 UNITS: 5000 INJECTION INTRAVENOUS; SUBCUTANEOUS at 18:03

## 2023-12-17 RX ADMIN — AMLODIPINE BESYLATE 10 MG: 10 TABLET ORAL at 08:28

## 2023-12-17 RX ADMIN — CARVEDILOL 37.5 MG: 25 TABLET, FILM COATED ORAL at 20:04

## 2023-12-17 RX ADMIN — TACROLIMUS 2.5 MG: 0.5 CAPSULE ORAL at 08:27

## 2023-12-17 RX ADMIN — LORATADINE 10 MG: 10 TABLET ORAL at 08:28

## 2023-12-17 RX ADMIN — CINACALCET 30 MG: 30 TABLET, FILM COATED ORAL at 08:28

## 2023-12-17 RX ADMIN — HYDRALAZINE HYDROCHLORIDE 50 MG: 25 TABLET, FILM COATED ORAL at 14:48

## 2023-12-17 RX ADMIN — TACROLIMUS: 1 OINTMENT TOPICAL at 20:07

## 2023-12-17 RX ADMIN — CARVEDILOL 37.5 MG: 25 TABLET, FILM COATED ORAL at 08:28

## 2023-12-17 RX ADMIN — LOSARTAN POTASSIUM 25 MG: 25 TABLET, FILM COATED ORAL at 10:52

## 2023-12-17 RX ADMIN — Medication 1000 UNITS: at 08:28

## 2023-12-17 RX ADMIN — PREDNISONE 10 MG: 5 TABLET ORAL at 08:28

## 2023-12-17 RX ADMIN — ACETAMINOPHEN 975 MG: 325 TABLET ORAL at 08:38

## 2023-12-17 RX ADMIN — HYDRALAZINE HYDROCHLORIDE 50 MG: 25 TABLET, FILM COATED ORAL at 20:04

## 2023-12-17 RX ADMIN — OXYCODONE HYDROCHLORIDE 5 MG: 5 TABLET ORAL at 14:48

## 2023-12-17 RX ADMIN — HYDRALAZINE HYDROCHLORIDE 50 MG: 25 TABLET, FILM COATED ORAL at 08:28

## 2023-12-17 RX ADMIN — PRAVASTATIN SODIUM 10 MG: 20 TABLET ORAL at 20:04

## 2023-12-17 RX ADMIN — TRAMADOL HYDROCHLORIDE 50 MG: 50 TABLET, COATED ORAL at 10:52

## 2023-12-17 RX ADMIN — FERROUS SULFATE TAB 325 MG (65 MG ELEMENTAL FE) 1 TABLET: 325 (65 FE) TAB at 20:04

## 2023-12-17 ASSESSMENT — PAIN DESCRIPTION - ORIENTATION
ORIENTATION: RIGHT
ORIENTATION: RIGHT

## 2023-12-17 ASSESSMENT — COGNITIVE AND FUNCTIONAL STATUS - GENERAL
CLIMB 3 TO 5 STEPS WITH RAILING: A LITTLE
MOBILITY SCORE: 23
WALKING IN HOSPITAL ROOM: A LITTLE
DAILY ACTIVITIY SCORE: 24
CLIMB 3 TO 5 STEPS WITH RAILING: A LITTLE
MOBILITY SCORE: 22
DAILY ACTIVITIY SCORE: 24

## 2023-12-17 ASSESSMENT — PAIN - FUNCTIONAL ASSESSMENT
PAIN_FUNCTIONAL_ASSESSMENT: 0-10

## 2023-12-17 ASSESSMENT — PAIN SCALES - GENERAL
PAINLEVEL_OUTOF10: 9
PAINLEVEL_OUTOF10: 3
PAINLEVEL_OUTOF10: 3
PAINLEVEL_OUTOF10: 8
PAINLEVEL_OUTOF10: 0 - NO PAIN

## 2023-12-17 ASSESSMENT — PAIN DESCRIPTION - DESCRIPTORS: DESCRIPTORS: ACHING;DISCOMFORT

## 2023-12-17 NOTE — CONSULTS
Reason For Consult  H/o kidney transplant, immunosuppression management    History Of Present Illness  Manolo Bashir is a 68 y.o. male with h/o of ESRD (on HD [id number]), s/p 2x renal transplants (1992, 2013) now with impaired allograft function CKD 3 (baseline Cr 2.5-3), on immunosuppression (pred, tac, azathioprine), h/op IgG and IgA lambda MGUS (recent hematologic eval including Bmbx with no e/o myeloma), DM2, HTN, prostate cancer s/p radical prostatectomy, baseline urinary incontinence, HCV s/p rx (PCR neg 10/2023) who is readmitted with worsening lower extremity swelling, difficulty breathing, 20 pound weight gain.    Of note patient was recently admitted 11/16/23- 11/22/23 with similar complaints, diuresed aggressively and sent home on p.o. Lasix which she was only taking occasionally.  During last admission patient underwent kidney biopsy for ISIAH, proteinuria, concern for MGRS.  Serologies notable for low C3, paraproteinemia as noted above, other renal serologies were unrevealing.  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 IF showed 1+ staining for IgM and C3.  Consistent with immune complex glomerulonephritis. also noted was severe arteriolar hyalinosis and arteriosclerosis.  Of note sample was limited.  Patient was discharged home on Imuran, tacrolimus, prednisone.  Was also started on losartan 25 mg twice daily given proteinuria (24hr urine with 5.8g total protein 11/2023).  He was instructed to follow-up in renal transplant clinic in 2 to 3 weeks but patient had to be readmitted.     Patient seen at bedside. C/o worsening generalized swelling including scrotal, ~20 Ibs weight gain and decreased urine output. C/o dyspnea with moderate exertion.  Has baseline urinary incontinence.  Denies any other LUTS at this time.    Received 40 mg IV Lasix last night with some response.    Creatinine at the time of discharge after last admission was 3.5  mg/dL.  Admission creatinine 3.1 --> 3.4 this am.  Serum electrolytes, acid-base parameters, other metabolic indices acceptable.  UA with no sediment, 2+ dipstick protein.      Current BP is slightly elevated.  Currently on losartan 25 mg daily, Imdur 30 mg daily, carvedilol 37.5 mg twice daily, amlodipine 10 mg daily, hydralazine 50 mg 3 times daily.  On tacrolimus 2.5 mg every 12 hours, azathioprine 25 mg daily, prednisone 10 mg daily.    ROS negative other than stated above.       Past Medical History  He has a past medical history of Chronic kidney disease, stage 3 unspecified (CMS/HCC) (09/26/2018), COVID-19 (06/18/2020), Diabetes (CMS/[facility]), HTN (hypertension), Other long term (current) drug therapy (07/20/2021), Personal history of other diseases of the circulatory system, Personal history of other infectious and parasitic diseases (08/17/2015), Polyp, colonic (08/17/2023), Primary osteoarthritis of both ankles (08/17/2023), Tubular adenoma of colon (08/17/2023), and Unspecified kidney failure (08/17/2016).    Surgical History  He has a past surgical history that includes Prostatectomy (10/11/2013); Ileostomy (04/25/2017); Other surgical history (04/21/2017); Ileostomy closure (08/17/2015); Other surgical history (08/17/2015); Other surgical history (08/17/2015); US guided percutaneous peritoneal or retroperitoneal fluid collection drainage (10/20/2022); transplant, kidney, open (1992); transplant, kidney, open (2013); and US guided percutaneous biopsy renal left (Left, 11/20/2023).     Social History  He reports that he has never smoked. He has never used smokeless tobacco. He reports current alcohol use. He reports current drug use. Drug: Oxycodone.    Family History  Family History   Problem Relation Name Age of Onset    Bone cancer Mother      Other (corona's sarcome of the bone marrow) Mother      Prostate cancer Father      Diabetes Other Family Hist     Hypertension Other Family Hist        "  Allergies  Patient has no known allergies.    Scheduled medications  amLODIPine, 10 mg, oral, Daily  azaTHIOprine, 25 mg, oral, Daily  carvedilol, 37.5 mg, oral, BID  cholecalciferol, 1,000 Units, oral, Daily  cinacalcet, 30 mg, oral, Daily  docusate sodium, 100 mg, oral, Daily  ferrous sulfate (325 mg ferrous sulfate), 65 mg of iron, oral, BID  heparin (porcine), 5,000 Units, subcutaneous, q8h  hydrALAZINE, 50 mg, oral, TID  insulin glargine, 12 Units, subcutaneous, Nightly  insulin lispro, 0-10 Units, subcutaneous, TID with meals  isosorbide mononitrate ER, 30 mg, oral, Daily  lidocaine, 1 patch, transdermal, Daily  loratadine, 10 mg, oral, Daily  losartan, 25 mg, oral, Daily  polyethylene glycol, 17 g, oral, Daily  pravastatin, 10 mg, oral, Nightly  predniSONE, 10 mg, oral, q AM  tacrolimus, 2.5 mg, oral, q12h ZOË  tacrolimus, , Topical, BID      Continuous medications     PRN medications  PRN medications: acetaminophen, dextrose, diclofenac sodium, glucagon, ondansetron ODT, oxyCODONE, traMADol      Physical Exam     Last Recorded Vitals  Blood pressure 150/82, pulse 56, temperature 36.6 °C (97.9 °F), temperature source Temporal, resp. rate 14, height 1.727 m (5' 8\"), weight 91.6 kg (202 lb), SpO2 98 %.    A&ox3, no distress  Pleasant  MMM, no lesions  Lungs with bibasilar crackles, no distress   Rrr, no r/g  Abd soft, nt, nd  No allograft tenderness  2+ generalized edema    Results for orders placed or performed during the hospital encounter of 12/16/23 (from the past 24 hour(s))   POCT GLUCOSE   Result Value Ref Range    POCT Glucose 93 74 - 99 mg/dL   Urine electrolytes   Result Value Ref Range    Sodium, Urine Random 131 mmol/L    Sodium/Creatinine Ratio 456 Not established. mmol/g Creat    Potassium, Urine Random 16 mmol/L    Potassium/Creatinine Ratio 56 Not established mmol/g Creat    Chloride, Urine Random 151 mmol/L    Chloride/Creatinine Ratio 526 (H) 23 - 275 mmol/g creat    Creatinine, Urine Random " 28.7 20.0 - 370.0 mg/dL   POCT GLUCOSE   Result Value Ref Range    POCT Glucose 172 (H) 74 - 99 mg/dL   CBC and Auto Differential   Result Value Ref Range    WBC 4.1 (L) 4.4 - 11.3 x10*3/uL    nRBC 0.0 0.0 - 0.0 /100 WBCs    RBC 3.69 (L) 4.50 - 5.90 x10*6/uL    Hemoglobin 10.4 (L) 13.5 - 17.5 g/dL    Hematocrit 31.8 (L) 41.0 - 52.0 %    MCV 86 80 - 100 fL    MCH 28.2 26.0 - 34.0 pg    MCHC 32.7 32.0 - 36.0 g/dL    RDW 14.5 11.5 - 14.5 %    Platelets 116 (L) 150 - 450 x10*3/uL    Neutrophils % 60.3 40.0 - 80.0 %    Immature Granulocytes %, Automated 0.7 0.0 - 0.9 %    Lymphocytes % 25.2 13.0 - 44.0 %    Monocytes % 10.4 2.0 - 10.0 %    Eosinophils % 3.2 0.0 - 6.0 %    Basophils % 0.2 0.0 - 2.0 %    Neutrophils Absolute 2.48 1.20 - 7.70 x10*3/uL    Immature Granulocytes Absolute, Automated 0.03 0.00 - 0.70 x10*3/uL    Lymphocytes Absolute 1.04 (L) 1.20 - 4.80 x10*3/uL    Monocytes Absolute 0.43 0.10 - 1.00 x10*3/uL    Eosinophils Absolute 0.13 0.00 - 0.70 x10*3/uL    Basophils Absolute 0.01 0.00 - 0.10 x10*3/uL   Comprehensive metabolic panel   Result Value Ref Range    Glucose 55 (LL) 74 - 99 mg/dL    Sodium 143 136 - 145 mmol/L    Potassium 4.1 3.5 - 5.3 mmol/L    Chloride 113 (H) 98 - 107 mmol/L    Bicarbonate 24 21 - 32 mmol/L    Anion Gap 10 10 - 20 mmol/L    Urea Nitrogen 38 (H) 6 - 23 mg/dL    Creatinine 3.43 (H) 0.50 - 1.30 mg/dL    eGFR 19 (L) >60 mL/min/1.73m*2    Calcium 8.9 8.6 - 10.6 mg/dL    Albumin 2.5 (L) 3.4 - 5.0 g/dL    Alkaline Phosphatase 53 33 - 136 U/L    Total Protein 4.6 (L) 6.4 - 8.2 g/dL    AST 13 9 - 39 U/L    Bilirubin, Total 0.3 0.0 - 1.2 mg/dL    ALT 12 10 - 52 U/L   Magnesium   Result Value Ref Range    Magnesium 1.89 1.60 - 2.40 mg/dL   Tacrolimus level   Result Value Ref Range    Tacrolimus  7.4 <=15.0 ng/mL   POCT GLUCOSE   Result Value Ref Range    POCT Glucose 51 (L) 74 - 99 mg/dL   POCT GLUCOSE   Result Value Ref Range    POCT Glucose 75 74 - 99 mg/dL   POCT GLUCOSE   Result Value  Ref Range    POCT Glucose 111 (H) 74 - 99 mg/dL       Allograft biopsy: 11/20/2023  FINAL DIAGNOSIS   KIDNEY ALLOGRAFT, LEFT ILIAC FOSSA, PERCUTANEOUS CORE BIOPSY:  -- LIMITED SAMPLE, PREDOMINANTLY MEDULLA  -- FOCAL CRESCENTIC GLOMERULONEPHRITIS  -- CHANGES SUGGESTIVE OF IMMUNE COMPLEX GLOMERULONEPHRITIS  -- SEVERE ARTERIOLAR HYALINOSIS AND ARTERIOSCLEROSIS   -- THICKENED GLOMERULAR BASEMENT MEMBRANES BY ELECTRON MICROSCOPY (SEE COMMENT)     TTE: 11/2023   1. Left ventricular systolic function is normal with a 60-65% estimated ejection fraction.   2. Left ventricular cavity size is moderately dilated.   3. The left ventricular posterior wall thickness is moderately increased.   4. There is severe concentric left ventricular hypertrophy.   5. Mild to moderate aortic valve regurgitation.   6. The left atrium is moderately dilated.   7. Compared with study from 6/21/2017, there is now moderate LV dysfunction and severe LV hypertrophy. The LV eyection fraction is unchanged. The aortic valve regurgitation has increased from trivial to mild to moderate.      A&P:    S/p DDKT: 1992 and 2013.   -Impaired allograft function.  Recent baseline serum creatinine 2.5-3, current labs with creatinine around 3.5 mg/dL.  -Renal allograft biopsy 11/2023 with no evidence of rejection, findings consistent with immune complex GN (vs PGNMID less likely).    - Patient already on antimetabolite, azathioprine.  Will try to optimize dose as tolerated.   -Given nephrotic range proteinuria, anasarca, worsening GFR will consider treatment with rituximab for (?de dmitriy) proliferative GN.  No evidence of active malignancy or infection.  -On losartan 25 mg/day for HTN, proteinuria.  Dose titration may be limited by worsening GFR.  -Please see recent urine total protein creatinine ratio.  -Electrolytes, other metabolic parameters acceptable.  -Hypervolemic on exam.  Would continue Lasix 40 mg IV twice daily.  Titrate dose based on response.  - HTN:  BP above goal.  Expect favorable response with diuresis.  Can consider increasing hydralazine to 100 mg 3 times daily if indicated.  - Anemia: Hb ~10, acceptable.  On p.o. iron.  - CKD-MBD: Calcium, Phos acceptable.  Consult for hyperparathyroidism.    Immunosuppression:  -Continue Tac 2.5 mgq12.  Check FK trough levels daily.  Last FK trough 7.4, goal 5-8.  -Increase Imuran to 50 mg/day, prednisone 10 mg/day.     Rest of the management per primary team.  Will follow      I spent 60 minutes in the professional and overall care of this patient.      Raad Rolle MD

## 2023-12-17 NOTE — CARE PLAN
The patient's goals for the shift include  sleep    The clinical goals for the shift include pt states he would like to sleep tonight + decrease fluid    Over the shift, the patient did make progress toward the following goals.       Problem: Fall/Injury  Goal: Not fall by end of shift  Outcome: Progressing  Goal: Be free from injury by end of the shift  Outcome: Progressing  Goal: Verbalize understanding of personal risk factors for fall in the hospital  Outcome: Progressing  Goal: Verbalize understanding of risk factor reduction measures to prevent injury from fall in the home  Outcome: Progressing  Goal: Use assistive devices by end of the shift  Outcome: Progressing  Goal: Pace activities to prevent fatigue by end of the shift  Outcome: Progressing

## 2023-12-17 NOTE — H&P
History Of Present Illness  Manolo Bashir is a 68 y.o. male presenting with Anasarca and decreased urine output in the setting of ESRD s/p Kidney transplant x2 now.     Mr. Bashir is an 67 y/o M w/ a PMH of ESRD (formerly on HD 2018-0644), CKD 3 (s/p 2x renal transplants (1992, 2013, currently on prednisone, tacrolimus, azathioprine, last baseline Cr 2.5-2.8), pancytopenia, angina (last EF 60-65% 2/201), IDDM2 (last A1c 6.0 % 10/26), HTN, prostate cancer s/p radical prostatectomy, HCV (treated and RNA not detected last 10/18/23) that presents w/  increased generalized swelling, especially in his b/l LE, scrotum, ~20Ibs weight gain and poor urine output. He states that this has had gradual buildup of fluids since his last admission and more recently has had collection to his scrotum causing significant discomfort. He notes abdominal swelling as well which is non-tender. He mentions that when he lies down the swelling goes down and he is able to make urine, on standing he has urine dribbling episodes and feels that he becomes more swollen. He mentions that he has been taking the PO Lasix given since his last discharge but has been inconsistent. He denies any recent confusion, f/c, n/v, SoB, chest pain, abdominal pain or burning w/ urination.    Of note he was recently admitted for a similar presentation (11/16 -11/22). He was aggressively diuresed then under the guidance of Nephrology and was sent home w/ PRN 20mg PO Lasix. His blood pressure on this admission was tenuous and he had his BP meds adjusted w/ plans for f/up w/ Nephrology and Renal transplant in the subsequent 2-3 weeks. 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     Important in his recent medical hx, the pt's pancytopenia had earlier been worked up  ~10/20 for MM Vs MGUS given additional findings of LB pain. 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 Further conclusive workup was to be completed in the outpt setting w/ Dr. Alejandro. The consulted hematology team were less concerned that the etiology for the intrinsic kidney failure was driven by the pt's supported dx of non-IgM MGUS.     Nephrology history:  Prelim report on kidney Bx  Small cortical sample (predominantly medulla)  9 glomeruli, 3 globally sclerosed  -- light microscopy appears to show a focal proliferative glomerulonephritis with one cellular crescent (more levels pending)  -- IF not specific with only 1 glomerulus (EM pending)  Approximately 5% interstitial fibrosis     Negative for acute T cell mediated rejection; no evidence of acute antibody-mediated r ejection; no viral cytopathic changes.  Congo red negative for amyloid.  No evidence of light chain cast nephropathy.     Hematology history:  SPEP (10/18/23): 0.2g/dL IgA Lambda M protein, 0.1g/dL IgG Kappa M protein  SFLC (10/19/23): 8.23mg/dL Kappa FLC, 6.40mg/dL Lambda FLC, FLC ratio 1.29  Immunoglobulins (10/20/23): IgA 523  Spot UPEP (11/16/23): Marked proteinuria, 2% IgA Lambda M protein, 1% IgG Lambda M protein    Review of Systems:  As stated in HPI    Past Medical History  Past Medical History:   Diagnosis Date    Chronic kidney disease, stage 3 unspecified (CMS/HCC) 09/26/2018    Stage 3 chronic kidney disease    COVID-19 06/18/2020    COVID-19 virus infection    Diabetes (CMS/HCC)     HTN (hypertension)     Other long term (current) drug therapy 07/20/2021    High risk medication use    Personal history of other diseases of the circulatory system     Personal history of cardiac murmur    Personal history of other infectious and parasitic diseases 08/17/2015    History of hepatitis    Polyp, colonic 08/17/2023    Primary osteoarthritis of both ankles 08/17/2023    Tubular adenoma of  colon 2023    Unspecified kidney failure 2016    Renal failure     Surgical History  Past Surgical History:   Procedure Laterality Date    ILEOSTOMY  2017    Ileostomy    ILEOSTOMY CLOSURE  2015    Ileostomy Closure    OTHER SURGICAL HISTORY  2017    Right Hemicolectomy    OTHER SURGICAL HISTORY  2015    Arteriovenous Surgery Creation Of A-V Fistula    OTHER SURGICAL HISTORY  2015    Sigmoidoscopy (Fiberoptic, Therapeutic )    PROSTATECTOMY  10/11/2013    Prostatectomy Radical    TRANSPLANT, KIDNEY, OPEN  1992    TRANSPLANT, KIDNEY, OPEN      US GUIDED PERCUTANEOUS BIOPSY RENAL LEFT Left 2023    US GUIDED PERCUTANEOUS BIOPSY RENAL LEFT 2023 Lou Rodgers MD [facility]    US GUIDED PERCUTANEOUS PERITONEAL OR RETROPERITONEAL FLUID COLLECTION DRAINAGE  10/20/2022    US GUIDED PERCUTANEOUS PERITONEAL OR RETROPERITONEAL FLUID COLLECTION DRAINAGE 10/20/2022 [facility] CLINICAL LEGACY        Social History  - Functional Status: Able to ambulate w/o difficulties or any aide  - Tobacco:  Denies use   - Alcohol: Socially  - Drugs: Denies use    Family History  Family History   Problem Relation Name Age of Onset    Bone cancer Mother      Other (corona's sarcome of the bone marrow) Mother      Prostate cancer Father      Diabetes Other Family Hist     Hypertension Other Family Hist      Allergies  Patient has no known allergies.    ED Data:  - Vital Signs: T  37.1  P 69  RR 18  BP  171/85 SPO2 98%  on RA  - Labs:  CBC:  WBC 4.0  Hgb  10.3  Plt 109  CHEM: Na  143 K  4.5 Cl 114  HCO3 24  Cr 3.11  BUN  39  Mag  1.97  Ca  9.1 Glu 109  LFTS: AST 17  ALT 13  ALP  55 TBili 0.3  Alb 2.6  Troponins: 46  VB.36/44 Lactate 1.1  BNP: 298  UA:  Unremarkable      - EKG:   NSR, some PACs, Qtc 412     - ED Interventions:   40mg IV lasix    Objective:  General: Awake, alert, conversant, appears stated age  HEENT: Pupils equal and round, no scleral icterus or conjunctivitis  Skin: Anasarca,  raised spots over face, L UE arm bumps of old fistula  Chest: Ctab, normal respiratory effort, not on supplemental oxygen  Cardiac: Regular rate and rhythm, normal s1, s2, no M/R/G, no JVD  Abdomen: Enlarged, moderately tight, NT, no involuntary guarding  : No flank pain or indwelling urinary catheter  EXT: General edema to b/l U and L extremities w/ R>L  MSK: No focal joint swelling noted  Neuro: AOx4, moving all limbs spontaneously, follows commands  Psych: Coherent thought process, appropriate mood and affect     Last Recorded Vitals  Blood pressure (!) 190/106, pulse 73, temperature 37.1 °C (98.8 °F), temperature source Tympanic, resp. rate 18, SpO2 98 %.    Relevant Results  Results for orders placed or performed during the hospital encounter of 12/16/23 (from the past 24 hour(s))   CBC and Auto Differential   Result Value Ref Range    WBC 4.0 (L) 4.4 - 11.3 x10*3/uL    nRBC 0.0 0.0 - 0.0 /100 WBCs    RBC 3.62 (L) 4.50 - 5.90 x10*6/uL    Hemoglobin 10.3 (L) 13.5 - 17.5 g/dL    Hematocrit 31.1 (L) 41.0 - 52.0 %    MCV 86 80 - 100 fL    MCH 28.5 26.0 - 34.0 pg    MCHC 33.1 32.0 - 36.0 g/dL    RDW 14.6 (H) 11.5 - 14.5 %    Platelets 109 (L) 150 - 450 x10*3/uL    Neutrophils % 68.6 40.0 - 80.0 %    Immature Granulocytes %, Automated 0.7 0.0 - 0.9 %    Lymphocytes % 19.0 13.0 - 44.0 %    Monocytes % 8.7 2.0 - 10.0 %    Eosinophils % 2.5 0.0 - 6.0 %    Basophils % 0.5 0.0 - 2.0 %    Neutrophils Absolute 2.75 1.20 - 7.70 x10*3/uL    Immature Granulocytes Absolute, Automated 0.03 0.00 - 0.70 x10*3/uL    Lymphocytes Absolute 0.76 (L) 1.20 - 4.80 x10*3/uL    Monocytes Absolute 0.35 0.10 - 1.00 x10*3/uL    Eosinophils Absolute 0.10 0.00 - 0.70 x10*3/uL    Basophils Absolute 0.02 0.00 - 0.10 x10*3/uL   Magnesium   Result Value Ref Range    Magnesium 1.97 1.60 - 2.40 mg/dL   Phosphorus   Result Value Ref Range    Phosphorus 3.1 2.5 - 4.9 mg/dL   Comprehensive metabolic panel   Result Value Ref Range    Glucose 109 (H) 74  - 99 mg/dL    Sodium 143 136 - 145 mmol/L    Potassium 4.5 3.5 - 5.3 mmol/L    Chloride 114 (H) 98 - 107 mmol/L    Bicarbonate 24 21 - 32 mmol/L    Anion Gap 10 10 - 20 mmol/L    Urea Nitrogen 39 (H) 6 - 23 mg/dL    Creatinine 3.11 (H) 0.50 - 1.30 mg/dL    eGFR 21 (L) >60 mL/min/1.73m*2    Calcium 9.1 8.6 - 10.6 mg/dL    Albumin 2.6 (L) 3.4 - 5.0 g/dL    Alkaline Phosphatase 55 33 - 136 U/L    Total Protein 4.9 (L) 6.4 - 8.2 g/dL    AST 17 9 - 39 U/L    Bilirubin, Total 0.3 0.0 - 1.2 mg/dL    ALT 13 10 - 52 U/L   B-type natriuretic peptide   Result Value Ref Range     (H) 0 - 99 pg/mL   Troponin I, High Sensitivity   Result Value Ref Range    Troponin I, High Sensitivity 46 0 - 53 ng/L   Urinalysis with Reflex Microscopic and Culture   Result Value Ref Range    Color, Urine Straw Straw, Yellow    Appearance, Urine Clear Clear    Specific Gravity, Urine 1.008 1.005 - 1.035    pH, Urine 5.0 5.0, 5.5, 6.0, 6.5, 7.0, 7.5, 8.0    Protein, Urine 100 (2+) (N) NEGATIVE mg/dL    Glucose, Urine 50 (1+) (A) NEGATIVE mg/dL    Blood, Urine SMALL (1+) (A) NEGATIVE    Ketones, Urine NEGATIVE NEGATIVE mg/dL    Bilirubin, Urine NEGATIVE NEGATIVE    Urobilinogen, Urine <2.0 <2.0 mg/dL    Nitrite, Urine NEGATIVE NEGATIVE    Leukocyte Esterase, Urine NEGATIVE NEGATIVE   Extra Urine Gray Tube   Result Value Ref Range    Extra Tube Hold for add-ons.    Urinalysis Microscopic   Result Value Ref Range    WBC, Urine 1-5 1-5, NONE /HPF    RBC, Urine 3-5 NONE, 1-2, 3-5 /HPF    Squamous Epithelial Cells, Urine 1-9 (SPARSE) Reference range not established. /HPF    Mucus, Urine 1+ Reference range not established. /LPF   POCT GLUCOSE   Result Value Ref Range    POCT Glucose 93 74 - 99 mg/dL      Imaging:  XR chest 2 views  FINDINGS:  CARDIOMEDIASTINAL SILHOUETTE: Heart is top normal size.    LUNGS: Lungs are clear.    ABDOMEN: No remarkable upper abdominal findings.    BONES: No acute osseous changes. Persistent left axillary vascular  stent    Meds:  Scheduled medications  amLODIPine, 10 mg, oral, Daily  [START ON 12/17/2023] azaTHIOprine, 25 mg, oral, Daily  carvedilol, 37.5 mg, oral, BID  cholecalciferol, 1,000 Units, oral, Daily  [START ON 12/17/2023] cinacalcet, 30 mg, oral, Daily  docusate sodium, 100 mg, oral, Daily  ferrous sulfate (325 mg ferrous sulfate), 65 mg of iron, oral, BID  furosemide, 40 mg, intravenous, Once  heparin (porcine), 5,000 Units, subcutaneous, q8h  hydrALAZINE, 50 mg, oral, TID  [START ON 12/17/2023] insulin glargine, 12 Units, subcutaneous, Nightly  insulin lispro, 0-10 Units, subcutaneous, TID with meals  [START ON 12/17/2023] loratadine, 10 mg, oral, Daily  polyethylene glycol, 17 g, oral, Daily  pravastatin, 10 mg, oral, Nightly  [START ON 12/17/2023] predniSONE, 10 mg, oral, q AM  tacrolimus, 2.5 mg, oral, q12h ZOË  tacrolimus, , Topical, BID    Continuous medications     PRN medications  PRN medications: dextrose, diclofenac sodium, glucagon, ondansetron ODT      Assessment/Plan   Principal Problem:    Fluid overload, unspecified    Assessment:  Pt is an 69 y/o M w/ a PMH of ESRD s/p formerly on HD then 2x renal transplants (1992, 2013, currently on prednisone, tacrolimus, azathioprine, last baseline Cr 2.5-2.8), pancytopenia, angina (last EF 60-65% 2/201), IDDM2 (last A1c 6.0 % 10/26), HTN, prostate cancer s/p radical prostatectomy, HCV (treated and RNA not detected last 10/18/23) now w/ multiple presentations for anasarca and poor urine output iso of transplant kidney. Unclear etiology. Prior success in diuresing w/ IV Lasix. Plan for Urine electrolytes to assess FENa and IV Lasix diureses to monitor for interval improvement. Will consult Nephrology and Renal transplant team for further medication adjustments whilst inpt to assess success s/p d/charge given multiple recurrences.     Plan:  #Anasarca, scrotal swelling   :: Likely driven by increased hydrostatic pressure secondary to renal failure and low  albumin causing low oncotic pressure.  :: Not likely heart failure given LV of 60 to 65% on last echo in November, however BNP elevated to 298  :: No suggestive findings of cirrhosis  :: Patient was sent home on furosemide 20mg PO; unclear of medication adherence given use parameters   :: Patient was given IV Lasix 40 mg with about 300 cc urine output. Given spot IV Lasix 80mg for diuresis in past admission w/ success  :: Patient is stable and not dyspneic on room air  - Strict I/Os   - Will consult nephrology; recs appreciated  - As needed IV Lasix and will CTM volume status and RFPs  - Scrotal care   - Will consider US of abdomen to assess any fluid collection    #ISIAH on CKD3   #Transplant kidney care  :: S/p kidney transplant x2  :: Last FENa 1.3%, plan for repeat assessment  :: Baseline creatinine 2.5-3  :: Patient was discharged with a creatinine of 3.5  :: On admission creatinine 3.1  :: Prelim biopsy done on last admission showing focal proliferative glomerulonephritis  - Will consult Transplant Nephrology; recs appreciated  - Continue tacrolimus, azathioprine, and prednisone given that these medications were continued upon discharge and ISIAH has not worsened  - Continue home cinacalcet 30 mg  - Continue home cholecalciferol  - Obtain tacrolimus levels in the a.m. before next dose of tacrolimus  - F/up Urine Lytes for FENa  - Avoid nephrotoxic agents, NSAIDs  - F/u final renal biopsy results from [facility]  - Avoid nephrotoxic drugs  - Renally dose all meds     #HTN  #HLD  - Continue home Hydralazine 25 mg PO TID  - Continue home Amlodipine 10 mg PO daily  - Continue home Carvedilol 37.5mg PO BID   - Hold home Losartan 25mg PO daily 2/2 ISIAH  - CTM BPs     #Hx of Back pain  ::MRI 09/19/2023 showing nonspecific focus of abnormal signal in R pedicle of L4 c/w osseous hemangioma, stress fracture, or osteoid osteoma. No discitis or osteomyelitis. No evidence of metastasis.  - Tylenol 975 mg q8h PRN mild - moderate pain,  Tramadol 50mg q12 PRN for severe pain  - Lidocaine patch    #MGUS  #Pancytopenia  :: Bone marrow biopsy done 10/26/2023 showing hypercellular bone marrow for 50% with maturing trilineage hematopoiesis and involved 5% by plasma cell neoplasm, FISH negative for gain of function mutation, hyper triploidy, rearrangement of IgH, deletion of TP53.  :: Skeletal survey on 11/19/2020 negative for osteolytic lesions  :: Likely chronic in nature  :: 12/16 WBC 4 Hgb 10.3 Plt 109  - CTM daily CBCs  - To continue mgmt w/ outpatient hematology     #IDDM  - Insulin glargine 12u qAM  - Moderate SSI      F: PRN  E: PRN  N; Low Na diet  A: PIV    GI Ppx: None   DVT PPx: SQH     Code Status: Full Code  NOK/MDM Amalia Enriquez (significant other) [phone]      Chavez Goins MD

## 2023-12-17 NOTE — PROGRESS NOTES
"Manolo Bashir is a 68 y.o. male on day 1 of admission presenting with Fluid overload, unspecified.    Subjective   No acute events were reported overnight. Pt was seen at bedside. Said that he slept well and that his scrotal swelling had began going down. He had good appetite and had been able to have a BM. Moderate urination after the PM dose of lasix was stopped. He denied any other complaints of f/c, n/v, chest pain, increase in abdominal pain, dysuria.    Objective:  VS:  BP (!) 167/94   Pulse 60   Temp 36.6 °C (97.9 °F) (Temporal)   Resp 20   Ht 1.727 m (5' 8\")   Wt 91.6 kg (202 lb)   SpO2 96%   BMI 30.71 kg/m²     Physical Exam:  General: Awake, alert, conversant, appears stated age  HEENT: Pupils equal and round, no scleral icterus or conjunctivitis  Skin: Anasarca, raised spots over face, L UE arm bumps of old fistula  Chest: Ctab, normal respiratory effort, not on supplemental oxygen  Cardiac: Regular rate and rhythm, normal s1, s2, no M/R/G, no JVD  Abdomen: Enlarged, moderately tight, NT, no involuntary guarding  : No flank pain or indwelling urinary catheter  EXT: General edema to b/l U and L extremities w/ R>L  MSK: No focal joint swelling noted  Neuro: AOx4, moving all limbs spontaneously, follows commands  Psych: Coherent thought process, appropriate mood and affect    Labs:  Results from last 7 days   Lab Units 12/17/23  0721 12/16/23  1138   WBC AUTO x10*3/uL 4.1* 4.0*   HEMOGLOBIN g/dL 10.4* 10.3*   HEMATOCRIT % 31.8* 31.1*   PLATELETS AUTO x10*3/uL 116* 109*   NEUTROS PCT AUTO % 60.3 68.6   LYMPHS PCT AUTO % 25.2 19.0   MONOS PCT AUTO % 10.4 8.7   EOS PCT AUTO % 3.2 2.5     Results from last 7 days   Lab Units 12/17/23  0721 12/16/23  1138   SODIUM mmol/L 143 143   POTASSIUM mmol/L 4.1 4.5   CHLORIDE mmol/L 113* 114*   CO2 mmol/L 24 24   BUN mg/dL 38* 39*   CREATININE mg/dL 3.43* 3.11*   CALCIUM mg/dL 8.9 9.1   PROTEIN TOTAL g/dL 4.6* 4.9*   BILIRUBIN TOTAL mg/dL 0.3 0.3   ALK PHOS U/L " 53 55   ALT U/L 12 13   AST U/L 13 17   GLUCOSE mg/dL 55* 109*     Results from last 7 days   Lab Units 12/17/23  0721 12/16/23  1138   MAGNESIUM mg/dL 1.89 1.97     Imaging:  None     Meds:  Scheduled medications  amLODIPine, 10 mg, oral, Daily  [START ON 12/18/2023] azaTHIOprine, 50 mg, oral, Daily  carvedilol, 37.5 mg, oral, BID  cholecalciferol, 1,000 Units, oral, Daily  cinacalcet, 30 mg, oral, Daily  docusate sodium, 100 mg, oral, Daily  ferrous sulfate (325 mg ferrous sulfate), 65 mg of iron, oral, BID  furosemide, 40 mg, intravenous, BID  heparin (porcine), 5,000 Units, subcutaneous, q8h  hydrALAZINE, 50 mg, oral, TID  insulin glargine, 12 Units, subcutaneous, Nightly  insulin lispro, 0-10 Units, subcutaneous, TID with meals  isosorbide mononitrate ER, 30 mg, oral, Daily  lidocaine, 1 patch, transdermal, Daily  loratadine, 10 mg, oral, Daily  losartan, 25 mg, oral, Daily  polyethylene glycol, 17 g, oral, Daily  pravastatin, 10 mg, oral, Nightly  predniSONE, 10 mg, oral, q AM  tacrolimus, 2.5 mg, oral, q12h ZOË  tacrolimus, , Topical, BID    Continuous medications     PRN medications  PRN medications: acetaminophen, dextrose, diclofenac sodium, glucagon, ondansetron ODT, oxyCODONE, traMADol    Assessment:  Pt is an 67 y/o M w/ a PMH of ESRD s/p formerly on HD then 2x renal transplants (1992, 2013, currently on prednisone, tacrolimus, azathioprine, last baseline Cr 2.5-2.8), pancytopenia, angina (last EF 60-65% 2/201), IDDM2 (last A1c 6.0 % 10/26), HTN, prostate cancer s/p radical prostatectomy, HCV (treated and RNA not detected last 10/18/23) now w/ multiple presentations for anasarca and poor urine output iso of transplant kidney. Unclear etiology. Prior success in diuresing w/ IV Lasix. Plan for Urine electrolytes to assess FENa and IV Lasix diureses to monitor for interval improvement. Will consult Nephrology and Renal transplant team for further medication adjustments whilst inpt to assess success s/p  d/charge given multiple recurrences.      Updates:  12/17:  - Slight worsening of ISIAH, Cr 3.11 to 3.43  - Transplant Nephrology consulted w/ recs as follows:  -Would continue Lasix 40 mg IV twice daily.  Titrate dose based on response.   -Can consider increasing hydralazine to 100 mg 3 times daily if indicated - given high BPs  -Consult Endocrine for hyperparathyroidism  -Check FK trough levels daily.  Last FK trough 7.4, goal 5-8  -Increase Imuran to 50 mg/day   -Prednisone 10 mg/day    Plan:  #Anasarca, scrotal swelling   :: Likely driven by increased hydrostatic pressure secondary to renal failure and low albumin causing low oncotic pressure.  :: Not likely heart failure given LV of 60 to 65% on last echo in November, however BNP elevated to 298  :: No suggestive findings of cirrhosis  :: Patient was sent home on furosemide 20mg PO; unclear of medication adherence given use parameters   :: Patient was given IV Lasix 40 mg with about 300 cc urine output. Given spot IV Lasix 80mg for diuresis in past admission w/ success  :: Patient is stable and not dyspneic on room air  - Strict I/Os   - Scrotal care   - CTM volume status and RFPs     #ISIAH on CKD3   #Transplant kidney care  :: S/p kidney transplant x2  :: Last FENa 1.3%, plan for repeat assessment  :: Baseline creatinine 2.5-3  :: Patient was discharged with a creatinine of 3.5  :: On admission creatinine 3.1  :: Prelim biopsy done on last admission showing focal proliferative glomerulonephritis  - Will consult Transplant Nephrology; recs appreciated  -Continue Lasix 40 mg IV twice daily   -Can consider increasing hydralazine to 100 mg 3 times daily if indicated - given high BPs  -Consult Endocrine for hyperparathyroidism  -Continue tacrolimus 2.5mg Check FK trough levels daily.  Last FK trough 7.4, goal 5-8  -Increased azathioprine to 50 mg/day   -Prednisone 10 mg/day  - Continue home cinacalcet 30 mg  - Continue home cholecalciferol  - F/up Urine Lytes for  FENa  - Avoid nephrotoxic agents, NSAIDs  - F/u final renal biopsy results from [facility]  - Avoid nephrotoxic drugs  - Renally dose all meds       #HTN  #HLD  - Continue home Hydralazine 25 mg PO TID  - Continue home Amlodipine 10 mg PO daily  - Continue home Carvedilol 37.5mg PO BID   - Continue home Losartan 25mg PO daily   - Start Isosorbide Mononitrate 30mg PO daily  - CTM BPs     #Hx of Back pain  ::MRI 09/19/2023 showing nonspecific focus of abnormal signal in R pedicle of L4 c/w osseous hemangioma, stress fracture, or osteoid osteoma. No discitis or osteomyelitis. No evidence of metastasis.  - Tylenol 975 mg q8h PRN mild - moderate pain, Tramadol 50mg q12 PRN for severe pain  - Lidocaine patch     #MGUS  #Pancytopenia  :: Bone marrow biopsy done 10/26/2023 showing hypercellular bone marrow for 50% with maturing trilineage hematopoiesis and involved 5% by plasma cell neoplasm, FISH negative for gain of function mutation, hyper triploidy, rearrangement of IgH, deletion of TP53.  :: Skeletal survey on 11/19/2020 negative for osteolytic lesions  :: Likely chronic in nature  :: 12/16 WBC 4 Hgb 10.3 Plt 109  - CTM daily CBCs  - To continue mgmt w/ outpatient hematology     #IDDM  - Insulin glargine 12u qAM  - Moderate SSI    F: PRN  E: PRN  N; Low Na diet  A: PIV     GI Ppx: None   DVT PPx: SQH     Code Status: Full Code  NOK/MDM Amalia Enriquez (significant other): [phone]      Chavez Goins MD

## 2023-12-17 NOTE — SIGNIFICANT EVENT
Please see the excellent intern/medical student note for the comprehensive H&P.    HPI  Manolo Bashir is a 68 y.o. male with PMHX of ESRD s/p 2 renal transplants (1992 followed by 2013), CKD 3, DM2, PRCA s/p radical prostatectomy, HCV (treated), and MGUS, presenting with anasarca and worsening scrotal swelling.      Patient reports that since his discharge he has been having worsening swelling all over his body in his bilateral lower extremity, his right arm, his abdomen, most significantly his scrotum which has been consistently worsening especially when he stands up which is even worsened by his history of prostate surgery which makes him incontinent when he stands up.  Patient reports compliance with his medications that he has his Lasix 20 mg p.o. daily for the past 2 weeks.  He still makes urine.  He feels like he has gained about 20 pounds.  Patient's scrotal swelling did not seem to have been a problem in his last admission and is a new finding.  Patient denies worsening shortness of breath, orthopnea.  He is physically limited by his scrotal swelling and incontinence upon standing up    Patient was admitted last month for anuria, dysuria, back pain, lower extremity swelling.  He was being followed by nephrology transplant team and a kidney biopsy was obtained on 11/20 and sent to [place] with prelim results showing proliferative glomerulonephritis with no signs of multiple myeloma.  Patient was supposed to follow-up outpatient with nephrology that he was not able to follow-up with.    During his admission patient was diuresed with IV Lasix 80 with improved urine output and lower extremity.  Patient was sent home and restarted on his home medications which include tacrolimus and azathioprine.      Patient's weight 2 weeks ago was 85 kg.    Patient's baseline creatinine is 2.5-2.8 before last admission, however he had bump in his creatinine during last admission to 3.5.  Today he presents with a  creatinine of 3.1. Total protein of 4.9 albumin of 2.6 which is around his baseline,  which is similar to his baseline.  Urinalysis positive for +2 protein +1 glucose and +1 blood, none of which are new findings.  Patient did not get urine lites in the ED.  He was given 40 mg IV of Lasix.  Saturating well on room air.      Of note, during his last admission hematology was consulted given patient's history of MGUS and concerned that his renal failure was due to possible myeloma, skeletal survey while inpatient was without lytic lesions, kidney biopsy preliminary read showed focal proliferative glomerulonephritis and this the etiology of ISIAH was most likely not multiple myeloma/MGUS.  Patient was recommended to follow-up outpatient with hematology.    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  Congo red negative for amyloid.  No evidence of light chain cast nephropathy.       Hematology history:    SPEP (10/18/23): 0.2g/dL IgA Lambda M protein, 0.1g/dL IgG Kappa M protein  SFLC (10/19/23): 8.23mg/dL Kappa FLC, 6.40mg/dL Lambda FLC, FLC ratio 1.29  Immunoglobulins (10/20/23): IgA 523  Spot UPEP (11/16/23): Marked proteinuria, 2% IgA Lambda M protein, 1% IgG Lambda M protein     BMBx (10/26/23):     -- HYPERCELLULAR BONE MARROW (50%) WITH MATURING TRILINEAGE HEMATOPOIESIS AND INVOLVED (5%) BY PLASMA CELL NEOPLASM     FISH NEGATIVE for gain of 1q, hyperdiploidy of 3,7 and 11, rearrangement of IGH, and deletion of TP53      Osseous survey (11/19/23):  IMPRESSION:  1. No evidence of suspicious  osteolytic lesion in the axial or  appendicular skeleton.  2. Large amount of lobulated soft tissue prominence in the radial  aspect of the left distal upper arm containing interrupted  calcifications. This may represent fistula for hemodialysis and  clinical correlation as well as physical examination suggested.  3. Prominent vascular calcifications of the extremities        - Imaging:  CXR  IMPRESSION:  No acute cardiopulmonary abnormality.    TTE 11/16/2023:  CONCLUSIONS:   1. Left ventricular systolic function is normal with a 60-65% estimated ejection fraction.   2. Left ventricular cavity size is moderately dilated.   3. The left ventricular posterior wall thickness is moderately increased.   4. There is severe concentric left ventricular hypertrophy.   5. Mild to moderate aortic valve regurgitation.   6. The left atrium is moderately dilated.   7. Compared with study from 6/21/2017, there is now moderate LV dysfunction and severe LV hypertrophy. The LV eyection fraction is unchanged. The aortic valve regurgitation has increased from trivial to mild to moderate.         Home Medications @ 11/22/2023:  acetaminophen, 975 mg, oral, q8h  amLODIPine, 10 mg, oral, Daily  azaTHIOprine, 25 mg, oral, Daily  carvedilol, 37.5 mg, oral, BID  cholecalciferol, 2,000 Units, oral, Daily  cinacalcet, 30 mg, oral, Daily  ferrous sulfate (325 mg ferrous sulfate), 65 mg of iron, oral, BID  heparin (porcine), 5,000 Units, subcutaneous, q8h ZOË  hydrALAZINE, 50 mg, oral, TID  insulin glargine, 20 Units, subcutaneous, Daily  insulin lispro, 0-10 Units, subcutaneous, TID with meals  lidocaine, 1 patch, transdermal, Daily  loratadine, 10 mg, oral, Daily  losartan, 25 mg, oral, Daily  melatonin, 5 mg, oral, Nightly  methocarbamol, 500 mg, oral, q8h ZOË  pravastatin, 40 mg, oral, Nightly  predniSONE, 10 mg, oral, q AM  tacrolimus, 2.5 mg, oral, q12h ZOË    Constitutional: in NAD  HEENT: sclerae anicteric, EOM grossly intact  CV: RRR,  no murmurs noted  Pulm: CTAB, no increased WOB  GI: abd soft, mildly distended, no fluid shift  : significant scrotal swelling  Skin: warm and dry  Ext: bilateral LE with +3 pitting edema to the knees  Neuro: alert and conversant  Psych: affect appropriate    Assessment & Plan  Manolo Bashir is a 68 y.o. male with PMHX of ESRD s/p 2 renal transplants (1992 followed by 2013), CKD 3, DM2, PRCA s/p radical prostatectomy, HCV (treated), and MGUS, presenting with anasarca and worsening scrotal swelling.      #Anasarca, Scrotal swelling  ::Likely driven by increased hydrostatic pressure secondary to renal failure and low albumin causing low oncotic pressure.  ::However edema related to decreased capillary and oncotic pressure is generally seen with plasma albumin concentration less than 2 however it could still be contributing.  Albumin loss due to nephrotic syndrome is possible pending final kidney biopsy:: No heart failure given LV of 60 to 65% on last echo in November  :: No suggestive findings of cirrhosis  - Patient was sent home on furosemide 20 mg p.o.  - Patient was given IV Lasix 40 mg with about 300 cc urine output  - Give IV Lasix 80 mg for diuresis  - Patient is stable and not dyspneic on room air  - Follow nephrology/transplant nephrology recommendations for fluid removal and consideration for dialysis    #Chronic renal failure  ::s/p kidney transplant x2  Baseline creatinine 2.5-3,  Patient was discharged with a creatinine of 3.5  On admission creatinine 3.1  Prelim biopsy done on last admission showing focal proliferative glomerulonephritis    Plan:  - Continue tacrolimus, azathioprine, and prednisone given that these medications were continued upon discharge and ISIAH has not worsened  - Obtain tacrolimus levels in the a.m. before next dose of tacrolimus  - Consult transplant nephrology  - Avoid nephrotoxic agents, NSAIDs  - F/u final renal biopsy results from [facility]  - Monitor I/Os  - Avoid nephrotoxic  drugs  - Renally dose all meds   - Continue home cinacalcet 30 mg  - Continue home cholecalciferol    #Hypertension  - Continue amlodipine, hydralazine and carvedilol  - Hold losartan    #MGUS  #Pancytopenia  :: Bone marrow biopsy done 10/26/2023 showing hypercellular bone marrow for 50% with maturing trilineage hematopoiesis and involved 5% by plasma cell neoplasm, FISH negative for gain of function mutation, hyper triploidy, rearrangement of IgH, deletion of TP53.  :: Skeletal survey on 11/19/2020 negative for osteolytic lesions  - Likely chronic in nature  - Continue to follow-up with outpatient hematology    #Back pain  ::MRI 09/19/2023 showing nonspecific focus of abnormal signal in R pedicle of L4 c/w osseous hemangioma, stress fracture, or osteoid osteoma. No discitis or osteomyelitis. No evidence of metastasis.  - Oxy 5 mg q6h PRN started for severe pain  - Skeletal survey to r/o lytic lesions  - Tylenol 975 mg q8h   - Lidocaine patch  - Tramadol 50mg q12 prn  - methocarbamol 500mg TID       #TIIDM  - Lantus 12 units (down from home 24)  - Moderate SSI      N: Low sodium diet  A: PIV    DVT ppx: SubQ heparin   GI ppx: Not indicated    Code Status: Full Code (confirmed on Admission)  Surrogate Medical Decision-maker: Vinicius Rodriguez (GUY) [phone]      Ismael Nieves, PGY-2

## 2023-12-18 LAB
ALBUMIN SERPL BCP-MCNC: 2.5 G/DL (ref 3.4–5)
ALP SERPL-CCNC: 57 U/L (ref 33–136)
ALT SERPL W P-5'-P-CCNC: 14 U/L (ref 10–52)
ANION GAP SERPL CALC-SCNC: 8 MMOL/L (ref 10–20)
AST SERPL W P-5'-P-CCNC: 13 U/L (ref 9–39)
BASOPHILS # BLD AUTO: 0.02 X10*3/UL (ref 0–0.1)
BASOPHILS NFR BLD AUTO: 0.5 %
BILIRUB SERPL-MCNC: 0.3 MG/DL (ref 0–1.2)
BUN SERPL-MCNC: 41 MG/DL (ref 6–23)
CALCIUM SERPL-MCNC: 8.7 MG/DL (ref 8.6–10.6)
CHLORIDE SERPL-SCNC: 111 MMOL/L (ref 98–107)
CO2 SERPL-SCNC: 26 MMOL/L (ref 21–32)
CREAT SERPL-MCNC: 3.53 MG/DL (ref 0.5–1.3)
EOSINOPHIL # BLD AUTO: 0.1 X10*3/UL (ref 0–0.7)
EOSINOPHIL NFR BLD AUTO: 2.6 %
ERYTHROCYTE [DISTWIDTH] IN BLOOD BY AUTOMATED COUNT: 14.2 % (ref 11.5–14.5)
GFR SERPL CREATININE-BSD FRML MDRD: 18 ML/MIN/1.73M*2
GLUCOSE BLD MANUAL STRIP-MCNC: 140 MG/DL (ref 74–99)
GLUCOSE BLD MANUAL STRIP-MCNC: 154 MG/DL (ref 74–99)
GLUCOSE BLD MANUAL STRIP-MCNC: 223 MG/DL (ref 74–99)
GLUCOSE BLD MANUAL STRIP-MCNC: 331 MG/DL (ref 74–99)
GLUCOSE SERPL-MCNC: 169 MG/DL (ref 74–99)
HAV IGM SER QL: NONREACTIVE
HBV CORE IGM SER QL: NONREACTIVE
HBV SURFACE AG SERPL QL IA: NONREACTIVE
HCT VFR BLD AUTO: 29.2 % (ref 41–52)
HCV AB SER QL: REACTIVE
HGB BLD-MCNC: 9.8 G/DL (ref 13.5–17.5)
IMM GRANULOCYTES # BLD AUTO: 0.03 X10*3/UL (ref 0–0.7)
IMM GRANULOCYTES NFR BLD AUTO: 0.8 % (ref 0–0.9)
LYMPHOCYTES # BLD AUTO: 0.85 X10*3/UL (ref 1.2–4.8)
LYMPHOCYTES NFR BLD AUTO: 21.9 %
MAGNESIUM SERPL-MCNC: 1.89 MG/DL (ref 1.6–2.4)
MCH RBC QN AUTO: 29.1 PG (ref 26–34)
MCHC RBC AUTO-ENTMCNC: 33.6 G/DL (ref 32–36)
MCV RBC AUTO: 87 FL (ref 80–100)
MONOCYTES # BLD AUTO: 0.42 X10*3/UL (ref 0.1–1)
MONOCYTES NFR BLD AUTO: 10.8 %
NEUTROPHILS # BLD AUTO: 2.47 X10*3/UL (ref 1.2–7.7)
NEUTROPHILS NFR BLD AUTO: 63.4 %
NRBC BLD-RTO: 0 /100 WBCS (ref 0–0)
PLATELET # BLD AUTO: 91 X10*3/UL (ref 150–450)
POTASSIUM SERPL-SCNC: 4.3 MMOL/L (ref 3.5–5.3)
PROT SERPL-MCNC: 4.6 G/DL (ref 6.4–8.2)
RBC # BLD AUTO: 3.37 X10*6/UL (ref 4.5–5.9)
SODIUM SERPL-SCNC: 141 MMOL/L (ref 136–145)
TACROLIMUS BLD-MCNC: 6.5 NG/ML
WBC # BLD AUTO: 3.9 X10*3/UL (ref 4.4–11.3)

## 2023-12-18 PROCEDURE — 80053 COMPREHEN METABOLIC PANEL: CPT

## 2023-12-18 PROCEDURE — 87522 HEPATITIS C REVRS TRNSCRPJ: CPT

## 2023-12-18 PROCEDURE — 80197 ASSAY OF TACROLIMUS: CPT

## 2023-12-18 PROCEDURE — 2500000001 HC RX 250 WO HCPCS SELF ADMINISTERED DRUGS (ALT 637 FOR MEDICARE OP)

## 2023-12-18 PROCEDURE — 82947 ASSAY GLUCOSE BLOOD QUANT: CPT

## 2023-12-18 PROCEDURE — 83735 ASSAY OF MAGNESIUM: CPT

## 2023-12-18 PROCEDURE — 36415 COLL VENOUS BLD VENIPUNCTURE: CPT

## 2023-12-18 PROCEDURE — 96372 THER/PROPH/DIAG INJ SC/IM: CPT

## 2023-12-18 PROCEDURE — 2500000002 HC RX 250 W HCPCS SELF ADMINISTERED DRUGS (ALT 637 FOR MEDICARE OP, ALT 636 FOR OP/ED)

## 2023-12-18 PROCEDURE — 99233 SBSQ HOSP IP/OBS HIGH 50: CPT | Performed by: HOSPITALIST

## 2023-12-18 PROCEDURE — 2500000004 HC RX 250 GENERAL PHARMACY W/ HCPCS (ALT 636 FOR OP/ED)

## 2023-12-18 PROCEDURE — 80074 ACUTE HEPATITIS PANEL: CPT

## 2023-12-18 PROCEDURE — 2500000005 HC RX 250 GENERAL PHARMACY W/O HCPCS

## 2023-12-18 PROCEDURE — 99233 SBSQ HOSP IP/OBS HIGH 50: CPT

## 2023-12-18 PROCEDURE — 85025 COMPLETE CBC W/AUTO DIFF WBC: CPT

## 2023-12-18 PROCEDURE — 1100000001 HC PRIVATE ROOM DAILY

## 2023-12-18 RX ORDER — HYDRALAZINE HYDROCHLORIDE 25 MG/1
100 TABLET, FILM COATED ORAL 3 TIMES DAILY
Status: DISCONTINUED | OUTPATIENT
Start: 2023-12-18 | End: 2023-12-21 | Stop reason: HOSPADM

## 2023-12-18 RX ORDER — CALCITRIOL 0.25 UG/1
0.25 CAPSULE ORAL DAILY
Status: DISCONTINUED | OUTPATIENT
Start: 2023-12-18 | End: 2023-12-21 | Stop reason: HOSPADM

## 2023-12-18 RX ORDER — HYDROMORPHONE HYDROCHLORIDE 1 MG/ML
0.2 INJECTION, SOLUTION INTRAMUSCULAR; INTRAVENOUS; SUBCUTANEOUS EVERY 6 HOURS PRN
Status: DISCONTINUED | OUTPATIENT
Start: 2023-12-18 | End: 2023-12-21 | Stop reason: HOSPADM

## 2023-12-18 RX ORDER — OXYCODONE HYDROCHLORIDE 5 MG/1
5 TABLET ORAL EVERY 6 HOURS PRN
Status: DISCONTINUED | OUTPATIENT
Start: 2023-12-18 | End: 2023-12-18

## 2023-12-18 RX ADMIN — TRAMADOL HYDROCHLORIDE 50 MG: 50 TABLET, COATED ORAL at 03:11

## 2023-12-18 RX ADMIN — INSULIN LISPRO 4 UNITS: 100 INJECTION, SOLUTION INTRAVENOUS; SUBCUTANEOUS at 18:16

## 2023-12-18 RX ADMIN — LIDOCAINE 1 PATCH: 4 PATCH TOPICAL at 08:57

## 2023-12-18 RX ADMIN — OXYCODONE HYDROCHLORIDE 5 MG: 5 TABLET ORAL at 13:10

## 2023-12-18 RX ADMIN — CINACALCET 30 MG: 30 TABLET, FILM COATED ORAL at 08:58

## 2023-12-18 RX ADMIN — HYDRALAZINE HYDROCHLORIDE 100 MG: 25 TABLET, FILM COATED ORAL at 20:25

## 2023-12-18 RX ADMIN — PREDNISONE 10 MG: 5 TABLET ORAL at 08:57

## 2023-12-18 RX ADMIN — HEPARIN SODIUM 5000 UNITS: 5000 INJECTION INTRAVENOUS; SUBCUTANEOUS at 01:25

## 2023-12-18 RX ADMIN — HYDRALAZINE HYDROCHLORIDE 100 MG: 25 TABLET, FILM COATED ORAL at 14:17

## 2023-12-18 RX ADMIN — OXYCODONE HYDROCHLORIDE 5 MG: 5 TABLET ORAL at 04:51

## 2023-12-18 RX ADMIN — TACROLIMUS 2.5 MG: 0.5 CAPSULE ORAL at 08:57

## 2023-12-18 RX ADMIN — ACETAMINOPHEN 975 MG: 325 TABLET ORAL at 09:01

## 2023-12-18 RX ADMIN — CALCITRIOL CAPSULES 0.25 MCG 0.25 MCG: 0.25 CAPSULE ORAL at 13:10

## 2023-12-18 RX ADMIN — Medication 1000 UNITS: at 08:57

## 2023-12-18 RX ADMIN — HEPARIN SODIUM 5000 UNITS: 5000 INJECTION INTRAVENOUS; SUBCUTANEOUS at 08:58

## 2023-12-18 RX ADMIN — AZATHIOPRINE 50 MG: 50 TABLET ORAL at 08:58

## 2023-12-18 RX ADMIN — ISOSORBIDE MONONITRATE 30 MG: 30 TABLET, EXTENDED RELEASE ORAL at 08:57

## 2023-12-18 RX ADMIN — FERROUS SULFATE TAB 325 MG (65 MG ELEMENTAL FE) 1 TABLET: 325 (65 FE) TAB at 08:58

## 2023-12-18 RX ADMIN — DOCUSATE SODIUM 100 MG: 100 CAPSULE, LIQUID FILLED ORAL at 08:57

## 2023-12-18 RX ADMIN — HEPARIN SODIUM 5000 UNITS: 5000 INJECTION INTRAVENOUS; SUBCUTANEOUS at 18:16

## 2023-12-18 RX ADMIN — TACROLIMUS: 1 OINTMENT TOPICAL at 09:00

## 2023-12-18 RX ADMIN — TACROLIMUS: 1 OINTMENT TOPICAL at 20:36

## 2023-12-18 RX ADMIN — INSULIN LISPRO 2 UNITS: 100 INJECTION, SOLUTION INTRAVENOUS; SUBCUTANEOUS at 08:58

## 2023-12-18 RX ADMIN — LORATADINE 10 MG: 10 TABLET ORAL at 08:57

## 2023-12-18 RX ADMIN — CARVEDILOL 37.5 MG: 25 TABLET, FILM COATED ORAL at 20:25

## 2023-12-18 RX ADMIN — FERROUS SULFATE TAB 325 MG (65 MG ELEMENTAL FE) 1 TABLET: 325 (65 FE) TAB at 20:25

## 2023-12-18 RX ADMIN — PRAVASTATIN SODIUM 10 MG: 20 TABLET ORAL at 20:26

## 2023-12-18 RX ADMIN — LOSARTAN POTASSIUM 25 MG: 25 TABLET, FILM COATED ORAL at 09:44

## 2023-12-18 RX ADMIN — CARVEDILOL 37.5 MG: 25 TABLET, FILM COATED ORAL at 08:57

## 2023-12-18 RX ADMIN — FUROSEMIDE 40 MG: 10 INJECTION, SOLUTION INTRAVENOUS at 20:25

## 2023-12-18 RX ADMIN — HYDRALAZINE HYDROCHLORIDE 50 MG: 25 TABLET, FILM COATED ORAL at 08:58

## 2023-12-18 RX ADMIN — FUROSEMIDE 40 MG: 10 INJECTION, SOLUTION INTRAVENOUS at 09:44

## 2023-12-18 RX ADMIN — TACROLIMUS 2.5 MG: 0.5 CAPSULE ORAL at 18:16

## 2023-12-18 RX ADMIN — TRAMADOL HYDROCHLORIDE 50 MG: 50 TABLET, COATED ORAL at 20:31

## 2023-12-18 RX ADMIN — AMLODIPINE BESYLATE 10 MG: 10 TABLET ORAL at 08:58

## 2023-12-18 RX ADMIN — INSULIN GLARGINE 12 UNITS: 100 INJECTION, SOLUTION SUBCUTANEOUS at 20:31

## 2023-12-18 ASSESSMENT — COGNITIVE AND FUNCTIONAL STATUS - GENERAL
DAILY ACTIVITIY SCORE: 24
MOBILITY SCORE: 24
CLIMB 3 TO 5 STEPS WITH RAILING: A LITTLE
DAILY ACTIVITIY SCORE: 24
MOBILITY SCORE: 23

## 2023-12-18 ASSESSMENT — PAIN SCALES - GENERAL
PAINLEVEL_OUTOF10: 10 - WORST POSSIBLE PAIN
PAINLEVEL_OUTOF10: 10 - WORST POSSIBLE PAIN
PAINLEVEL_OUTOF10: 3
PAINLEVEL_OUTOF10: 9
PAINLEVEL_OUTOF10: 0 - NO PAIN
PAINLEVEL_OUTOF10: 9

## 2023-12-18 ASSESSMENT — PAIN - FUNCTIONAL ASSESSMENT
PAIN_FUNCTIONAL_ASSESSMENT: 0-10

## 2023-12-18 ASSESSMENT — ACTIVITIES OF DAILY LIVING (ADL): LACK_OF_TRANSPORTATION: NO

## 2023-12-18 ASSESSMENT — PAIN DESCRIPTION - LOCATION
LOCATION: HIP
LOCATION: HIP

## 2023-12-18 ASSESSMENT — PAIN DESCRIPTION - ORIENTATION
ORIENTATION: RIGHT
ORIENTATION: RIGHT

## 2023-12-18 ASSESSMENT — PAIN DESCRIPTION - DESCRIPTORS: DESCRIPTORS: ACHING

## 2023-12-18 NOTE — PROGRESS NOTES
12/18/23 1426   Discharge Planning   Living Arrangements Children  (dtr)   Support Systems Children   Assistance Needed none-indpendent with ADLs   Type of Residence Private residence   Who is requesting discharge planning? Provider   Home or Post Acute Services None   Patient expects to be discharged to: Home   Does the patient need discharge transport arranged? No  (dtr or family)   Financial Resource Strain   How hard is it for you to pay for the very basics like food, housing, medical care, and heating? Not hard   Housing Stability   In the last 12 months, was there a time when you were not able to pay the mortgage or rent on time? N   In the last 12 months, was there a time when you did not have a steady place to sleep or slept in a shelter (including now)? N   Transportation Needs   In the past 12 months, has lack of transportation kept you from medical appointments or from getting medications? no   In the past 12 months, has lack of transportation kept you from meetings, work, or from getting things needed for daily living? No       PCP: none   PHARMACY: [facility]   MEDICATIONS AFFORDABLE: yes  RECENT FALLS: N/A  FEELS SAFE AT HOME: yes   EQUIPMENT USED IN HOME: N/A  HOME O2/CPAP/NEBS: N/A  DIABETIC/SUPPLIES NEEDED: Yes/no supplies needed   Pt states he has a RN from his insurance that comes 2x/mth to visit him at home.     Address, phone and emergency contact information verified. All questions and concerns answered. Will continue to follow for discharge needs.

## 2023-12-18 NOTE — CARE PLAN
The clinical goals for the shift include pt will remain safe and free of fall    Over the shift, the patient did make progress toward the following goals.       Problem: Fall/Injury  Goal: Not fall by end of shift  Outcome: Progressing  Goal: Be free from injury by end of the shift  Outcome: Progressing  Goal: Verbalize understanding of personal risk factors for fall in the hospital  Outcome: Progressing  Goal: Verbalize understanding of risk factor reduction measures to prevent injury from fall in the home  Outcome: Progressing  Goal: Use assistive devices by end of the shift  Outcome: Progressing  Goal: Pace activities to prevent fatigue by end of the shift  Outcome: Progressing

## 2023-12-18 NOTE — PROGRESS NOTES
"Manolo Bashir is a 68 y.o. male on day 2 of admission presenting with Fluid overload, unspecified.    Subjective   No acute events were reported overnight. Pt was seen at bedside. Said that he slept well and that his scrotal swelling continues to go down. He had good appetite and had been able to have a BM. Endorses interval improvement to his overall swelling. Endorses some back pain similar to his chronic back pain and some right sided leg pain that is shooting and he describes this as similar to his prior pain. Told him that will include meds to cover breakthrough pain. He denied any other complaints of f/c, n/v, chest pain, increase in abdominal pain, dysuria.    Objective:  VS:  /76 (BP Location: Right arm, Patient Position: Lying)   Pulse 66   Temp 37.3 °C (99.1 °F) (Tympanic)   Resp 18   Ht 1.727 m (5' 8\")   Wt 91.6 kg (202 lb)   SpO2 100%   BMI 30.71 kg/m²     Physical Exam:  General: Awake, alert, conversant, appears stated age  HEENT: Pupils equal and round, no scleral icterus or conjunctivitis  Skin: Anasarca, raised spots over face, L UE arm bumps of old fistula  Chest: Ctab, normal respiratory effort, not on supplemental oxygen  Cardiac: Regular rate and rhythm, normal s1, s2, no M/R/G, no JVD  Abdomen: Enlarged, moderately tight, NT, no involuntary guarding  : No flank pain or indwelling urinary catheter  EXT: General edema to b/l U and L extremities w/ R>L  MSK: No focal joint swelling noted  Neuro: AOx4, moving all limbs spontaneously, follows commands  Psych: Coherent thought process, appropriate mood and affect    Labs:  Results from last 7 days   Lab Units 12/18/23  0845 12/17/23  0721 12/16/23  1138   WBC AUTO x10*3/uL 3.9* 4.1* 4.0*   HEMOGLOBIN g/dL 9.8* 10.4* 10.3*   HEMATOCRIT % 29.2* 31.8* 31.1*   PLATELETS AUTO x10*3/uL 91* 116* 109*   NEUTROS PCT AUTO % 63.4 60.3 68.6   LYMPHS PCT AUTO % 21.9 25.2 19.0   MONOS PCT AUTO % 10.8 10.4 8.7   EOS PCT AUTO % 2.6 3.2 2.5 "     Results from last 7 days   Lab Units 12/18/23  0845 12/17/23  0721 12/16/23  1138   SODIUM mmol/L 141 143 143   POTASSIUM mmol/L 4.3 4.1 4.5   CHLORIDE mmol/L 111* 113* 114*   CO2 mmol/L 26 24 24   BUN mg/dL 41* 38* 39*   CREATININE mg/dL 3.53* 3.43* 3.11*   CALCIUM mg/dL 8.7 8.9 9.1   PROTEIN TOTAL g/dL 4.6* 4.6* 4.9*   BILIRUBIN TOTAL mg/dL 0.3 0.3 0.3   ALK PHOS U/L 57 53 55   ALT U/L 14 12 13   AST U/L 13 13 17   GLUCOSE mg/dL 169* 55* 109*     Results from last 7 days   Lab Units 12/18/23  0845 12/17/23  0721 12/16/23  1138   MAGNESIUM mg/dL 1.89 1.89 1.97     Imaging:  None     Meds:  Scheduled medications  amLODIPine, 10 mg, oral, Daily  azaTHIOprine, 50 mg, oral, Daily  calcitriol, 0.25 mcg, oral, Daily  carvedilol, 37.5 mg, oral, BID  cholecalciferol, 1,000 Units, oral, Daily  cinacalcet, 30 mg, oral, Daily  docusate sodium, 100 mg, oral, Daily  ferrous sulfate (325 mg ferrous sulfate), 65 mg of iron, oral, BID  furosemide, 40 mg, intravenous, BID  heparin (porcine), 5,000 Units, subcutaneous, q8h  hydrALAZINE, 100 mg, oral, TID  insulin glargine, 12 Units, subcutaneous, Nightly  insulin lispro, 0-10 Units, subcutaneous, TID with meals  isosorbide mononitrate ER, 30 mg, oral, Daily  lidocaine, 1 patch, transdermal, Daily  loratadine, 10 mg, oral, Daily  losartan, 25 mg, oral, Daily  polyethylene glycol, 17 g, oral, Daily  pravastatin, 10 mg, oral, Nightly  predniSONE, 10 mg, oral, q AM  tacrolimus, 2.5 mg, oral, q12h ZOË  tacrolimus, , Topical, BID    Continuous medications     PRN medications  PRN medications: acetaminophen, dextrose, diclofenac sodium, glucagon, HYDROmorphone, ondansetron ODT, oxyCODONE, traMADol    Assessment:  Pt is an 69 y/o M w/ a PMH of ESRD s/p formerly on HD then 2x renal transplants (1992, 2013, currently on prednisone, tacrolimus, azathioprine, last baseline Cr 2.5-2.8), pancytopenia, angina (last EF 60-65% 2/201), IDDM2 (last A1c 6.0 % 10/26), HTN, prostate cancer s/p  radical prostatectomy, HCV (treated and RNA not detected last 10/18/23) now w/ multiple presentations for anasarca and poor urine output iso of transplant kidney. Unclear etiology. Prior success in diuresing w/ IV Lasix. Plan for Urine electrolytes to assess FENa and IV Lasix diureses to monitor for interval improvement. Will consult Nephrology and Renal transplant team for further medication adjustments whilst inpt to assess success s/p d/charge given multiple recurrences.      Updates:  12/18:  - Still w/ diureses for 40mg IV Lasix BID. Some Cr worsening, 3.43 ->3.53  - BP elevated and per Transplant Nephrology recs increased Hydralazine to 100mg TID  - Drop across 3 CBC cell lines WBC 3.9 Hgb 9.8 Plt 91. Likely dilutional.   - Added Calcitriol given 2dary parathyroidism in setting of CKD and worsening kidney function    12/17:  - Slight worsening of ISIAH, Cr 3.11 to 3.43  - Transplant Nephrology consulted w/ recs as follows:  -Would continue Lasix 40 mg IV twice daily.  Titrate dose based on response.   -Can consider increasing hydralazine to 100 mg 3 times daily if indicated - given high BPs  -Consult Endocrine for hyperparathyroidism  -Check FK trough levels daily.  Last FK trough 7.4, goal 5-8  -Increase Imuran to 50 mg/day   -Prednisone 10 mg/day    Plan:  #Anasarca, scrotal swelling   :: Likely driven by increased hydrostatic pressure secondary to renal failure and low albumin causing low oncotic pressure.  :: Not likely heart failure given LV of 60 to 65% on last echo in November, however BNP elevated to 298  :: No suggestive findings of cirrhosis  :: Patient was sent home on furosemide 20mg PO; unclear of medication adherence given use parameters   :: Patient was given IV Lasix 40 mg with about 300 cc urine output. Given spot IV Lasix 80mg for diuresis in past admission w/ success  :: Patient is stable and not dyspneic on room air  - Strict I/Os   - Scrotal care   - CTM volume status and RFPs     #ISIAH on  CKD3   #Transplant kidney care  :: S/p kidney transplant x2  :: Last FENa 1.3%, plan for repeat assessment  :: Baseline creatinine 2.5-3  :: Patient was discharged with a creatinine of 3.5  :: On admission creatinine 3.1  :: Prelim biopsy done on last admission showing focal proliferative glomerulonephritis  - Will consult Transplant Nephrology; recs appreciated  -Continue Lasix 40 mg IV twice daily   -Can consider increasing hydralazine to 100 mg 3 times daily if indicated - given high BPs  -Endocrine consult for hyperparathyroidism on d/charge  -Continue tacrolimus 2.5mg Check FK trough levels daily.  Last FK trough 7.4, goal 5-8  -Increased azathioprine to 50 mg/day   -Prednisone 10 mg/day  - Continue home cinacalcet 30 mg  - Added calcitriol  - F/up Urine Lytes for FENa  - Avoid nephrotoxic agents, NSAIDs  - F/u final renal biopsy results from [facility]  - Avoid nephrotoxic drugs  - Renally dose all meds       #HTN  #HLD  - Increased home Hydralazine 100mg PO TID  - Continue home Amlodipine 10mg PO daily  - Continue home Carvedilol 37.5mg PO BID   - Continue home Losartan 25mg PO daily   - Started Isosorbide Mononitrate 30mg PO daily  - CTM Bps and titrate meds accordingly      #Hx of Back pain  ::MRI 09/19/2023 showing nonspecific focus of abnormal signal in R pedicle of L4 c/w osseous hemangioma, stress fracture, or osteoid osteoma. No discitis or osteomyelitis. No evidence of metastasis.  - Tylenol 975 mg q8h PRN mild - moderate pain, Tramadol 50mg q12 PRN for severe pain. Oxycodone 5mg PO q6h for BT pain  - Lidocaine patch     #MGUS  #Pancytopenia  :: Bone marrow biopsy done 10/26/2023 showing hypercellular bone marrow for 50% with maturing trilineage hematopoiesis and involved 5% by plasma cell neoplasm, FISH negative for gain of function mutation, hyper triploidy, rearrangement of IgH, deletion of TP53.  :: Skeletal survey on 11/19/2020 negative for osteolytic lesions  :: Likely chronic in nature  :: 12/16 WBC 4  Hgb 10.3 Plt 109  - CTM daily CBCs  - To continue mgmt w/ outpatient hematology     #IDDM  - Insulin glargine 12u qAM  - Moderate SSI    #Hyperparathyroidism   :: Likely 2dary given High PTH and normal Corrected Ca and Phosph  - Start Calcitriol 0.25mcg daily  - Endocrine consult for hyperparathyroidism on d/charge    F: PRN  E: PRN  N; Low Na diet  A: PIV     GI Ppx: None   DVT PPx: SQH     Code Status: Full Code  NOK/MDM Amalia Enriquez (significant other): [phone]      Chavez Goins MD

## 2023-12-18 NOTE — PROGRESS NOTES
Manolo Bashir is a 68 y.o. male on day 2 of admission presenting with Fluid overload, unspecified.      Subjective   Doing ok this morning       Objective     Vitals 24HR  Heart Rate:  [66-70]   Temp:  [36.9 °C (98.4 °F)-37.1 °C (98.8 °F)]   Resp:  [18]   BP: (147-173)/(73-86)   SpO2:  [99 %-100 %]     General appearance: no distress  Eyes: non-icteric  HEENT: atrumatic head, PEERLA, moist mucosa  Skin: no apparent rash  Heart: NSR, S1, S2 normal, no murmur or gallop  Lungs: Basilar crackles  Abdomen: soft, nt/nd  Extremities: 2+ edema  Neuro: GCS 15/15    Intake/Output last 3 Shifts:    Intake/Output Summary (Last 24 hours) at 12/18/2023 1226  Last data filed at 12/18/2023 0453  Gross per 24 hour   Intake --   Output 500 ml   Net -500 ml     Scheduled medications  amLODIPine, 10 mg, oral, Daily  azaTHIOprine, 50 mg, oral, Daily  calcitriol, 0.25 mcg, oral, Daily  carvedilol, 37.5 mg, oral, BID  cholecalciferol, 1,000 Units, oral, Daily  cinacalcet, 30 mg, oral, Daily  docusate sodium, 100 mg, oral, Daily  ferrous sulfate (325 mg ferrous sulfate), 65 mg of iron, oral, BID  furosemide, 40 mg, intravenous, BID  heparin (porcine), 5,000 Units, subcutaneous, q8h  hydrALAZINE, 100 mg, oral, TID  insulin glargine, 12 Units, subcutaneous, Nightly  insulin lispro, 0-10 Units, subcutaneous, TID with meals  isosorbide mononitrate ER, 30 mg, oral, Daily  lidocaine, 1 patch, transdermal, Daily  loratadine, 10 mg, oral, Daily  losartan, 25 mg, oral, Daily  polyethylene glycol, 17 g, oral, Daily  pravastatin, 10 mg, oral, Nightly  predniSONE, 10 mg, oral, q AM  tacrolimus, 2.5 mg, oral, q12h ZOË  tacrolimus, , Topical, BID      Continuous medications     PRN medications  PRN medications: acetaminophen, dextrose, diclofenac sodium, glucagon, HYDROmorphone, ondansetron ODT, oxyCODONE, traMADol       Relevant Results    Results for orders placed or performed during the hospital encounter of 12/16/23 (from the past 24 hour(s))    POCT GLUCOSE   Result Value Ref Range    POCT Glucose 214 (H) 74 - 99 mg/dL   POCT GLUCOSE   Result Value Ref Range    POCT Glucose 247 (H) 74 - 99 mg/dL   POCT GLUCOSE   Result Value Ref Range    POCT Glucose 154 (H) 74 - 99 mg/dL   CBC and Auto Differential   Result Value Ref Range    WBC 3.9 (L) 4.4 - 11.3 x10*3/uL    nRBC 0.0 0.0 - 0.0 /100 WBCs    RBC 3.37 (L) 4.50 - 5.90 x10*6/uL    Hemoglobin 9.8 (L) 13.5 - 17.5 g/dL    Hematocrit 29.2 (L) 41.0 - 52.0 %    MCV 87 80 - 100 fL    MCH 29.1 26.0 - 34.0 pg    MCHC 33.6 32.0 - 36.0 g/dL    RDW 14.2 11.5 - 14.5 %    Platelets 91 (L) 150 - 450 x10*3/uL    Neutrophils % 63.4 40.0 - 80.0 %    Immature Granulocytes %, Automated 0.8 0.0 - 0.9 %    Lymphocytes % 21.9 13.0 - 44.0 %    Monocytes % 10.8 2.0 - 10.0 %    Eosinophils % 2.6 0.0 - 6.0 %    Basophils % 0.5 0.0 - 2.0 %    Neutrophils Absolute 2.47 1.20 - 7.70 x10*3/uL    Immature Granulocytes Absolute, Automated 0.03 0.00 - 0.70 x10*3/uL    Lymphocytes Absolute 0.85 (L) 1.20 - 4.80 x10*3/uL    Monocytes Absolute 0.42 0.10 - 1.00 x10*3/uL    Eosinophils Absolute 0.10 0.00 - 0.70 x10*3/uL    Basophils Absolute 0.02 0.00 - 0.10 x10*3/uL   Comprehensive metabolic panel   Result Value Ref Range    Glucose 169 (H) 74 - 99 mg/dL    Sodium 141 136 - 145 mmol/L    Potassium 4.3 3.5 - 5.3 mmol/L    Chloride 111 (H) 98 - 107 mmol/L    Bicarbonate 26 21 - 32 mmol/L    Anion Gap 8 (L) 10 - 20 mmol/L    Urea Nitrogen 41 (H) 6 - 23 mg/dL    Creatinine 3.53 (H) 0.50 - 1.30 mg/dL    eGFR 18 (L) >60 mL/min/1.73m*2    Calcium 8.7 8.6 - 10.6 mg/dL    Albumin 2.5 (L) 3.4 - 5.0 g/dL    Alkaline Phosphatase 57 33 - 136 U/L    Total Protein 4.6 (L) 6.4 - 8.2 g/dL    AST 13 9 - 39 U/L    Bilirubin, Total 0.3 0.0 - 1.2 mg/dL    ALT 14 10 - 52 U/L   Magnesium   Result Value Ref Range    Magnesium 1.89 1.60 - 2.40 mg/dL   Tacrolimus level   Result Value Ref Range    Tacrolimus  6.5 <=15.0 ng/mL        Assessment/Plan     Mr Bashir is a 67yo  M with PMH ESRD (on HD [id number]), s/p 2x renal transplants (1992, 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     #Allograft function  -Cr 3.53 today, around recent baseline, though has been worsening over past months   -Urine output: 500cc/24h   (charted)    -Renal biopsy (Nov 2023):  no evidence of active ACR or ABMR, did show focal proliferative GN with 1 cellular crescent, . IF showed 1+ staining for IgM and C3.  Consistent with immune complex glomerulonephritis. also noted was severe arteriolar hyalinosis and arteriosclerosis.     -UA: 2+ Protein     Suspect that GN is due to MGUS    Recommendations:  -Oncology clearance for Rituximab. Once cleared from Oncology point of view, can proceed with Rituximab 1g IV, followed by 2nd dose 2 weeks thereafter  -Check Hepatitis B, Hepatitis C  -Lasix 40mg IV bid   -Check spot urine total protein: creatinine ratio     #Immunosuppression  -Azathioprine 50mg po once daily  -Prednisone 10mg po once daily   -Tacrolimus 2.5mg po q12h   -Check Tacrolimus trough daily. Goal Tac trough 5-8     #HTN  -BP elevated  -Amlodipine 10mg po once daily  -Carvedilol 37.5mg po bid   -Hydralazine 100mg po tid   -Losartan 25mg po once daily   -Will monitor today before making further changes     #Acid base  -Stable, HCO3 26    #Electrolytes  -K 4.3     #CKD-MBD  -Calcitriol 0.25mcg po daily  -Vit D3 1000 units po daily  -Cinacalcet 30mg po once daily    #CKD- Anemia  -Continue oral iron  -HGB 9.8     SW attending Dr Dina Kidd MD  Nephrology fellow PGY4   Transplant nephrology pager [phone]

## 2023-12-19 LAB
ALBUMIN SERPL BCP-MCNC: 2.6 G/DL (ref 3.4–5)
ALP SERPL-CCNC: 67 U/L (ref 33–136)
ALT SERPL W P-5'-P-CCNC: 13 U/L (ref 10–52)
ANION GAP SERPL CALC-SCNC: 9 MMOL/L (ref 10–20)
AST SERPL W P-5'-P-CCNC: 11 U/L (ref 9–39)
BASOPHILS # BLD AUTO: 0.02 X10*3/UL (ref 0–0.1)
BASOPHILS NFR BLD AUTO: 0.5 %
BILIRUB SERPL-MCNC: 0.2 MG/DL (ref 0–1.2)
BUN SERPL-MCNC: 44 MG/DL (ref 6–23)
CALCIUM SERPL-MCNC: 8.4 MG/DL (ref 8.6–10.6)
CHLORIDE SERPL-SCNC: 108 MMOL/L (ref 98–107)
CO2 SERPL-SCNC: 27 MMOL/L (ref 21–32)
CREAT SERPL-MCNC: 3.49 MG/DL (ref 0.5–1.3)
EOSINOPHIL # BLD AUTO: 0.08 X10*3/UL (ref 0–0.7)
EOSINOPHIL NFR BLD AUTO: 2 %
ERYTHROCYTE [DISTWIDTH] IN BLOOD BY AUTOMATED COUNT: 14.2 % (ref 11.5–14.5)
GFR SERPL CREATININE-BSD FRML MDRD: 18 ML/MIN/1.73M*2
GLUCOSE BLD MANUAL STRIP-MCNC: 110 MG/DL (ref 74–99)
GLUCOSE BLD MANUAL STRIP-MCNC: 205 MG/DL (ref 74–99)
GLUCOSE BLD MANUAL STRIP-MCNC: 286 MG/DL (ref 74–99)
GLUCOSE BLD MANUAL STRIP-MCNC: 302 MG/DL (ref 74–99)
GLUCOSE SERPL-MCNC: 241 MG/DL (ref 74–99)
HCT VFR BLD AUTO: 31.2 % (ref 41–52)
HCV RNA SERPL NAA+PROBE-ACNC: NOT DETECTED K[IU]/ML
HCV RNA SERPL NAA+PROBE-LOG IU: NORMAL {LOG_IU}/ML
HGB BLD-MCNC: 10.2 G/DL (ref 13.5–17.5)
IMM GRANULOCYTES # BLD AUTO: 0.05 X10*3/UL (ref 0–0.7)
IMM GRANULOCYTES NFR BLD AUTO: 1.2 % (ref 0–0.9)
LYMPHOCYTES # BLD AUTO: 0.79 X10*3/UL (ref 1.2–4.8)
LYMPHOCYTES NFR BLD AUTO: 19.7 %
MAGNESIUM SERPL-MCNC: 1.77 MG/DL (ref 1.6–2.4)
MCH RBC QN AUTO: 28.6 PG (ref 26–34)
MCHC RBC AUTO-ENTMCNC: 32.7 G/DL (ref 32–36)
MCV RBC AUTO: 87 FL (ref 80–100)
MONOCYTES # BLD AUTO: 0.39 X10*3/UL (ref 0.1–1)
MONOCYTES NFR BLD AUTO: 9.7 %
NEUTROPHILS # BLD AUTO: 2.68 X10*3/UL (ref 1.2–7.7)
NEUTROPHILS NFR BLD AUTO: 66.9 %
NRBC BLD-RTO: 0 /100 WBCS (ref 0–0)
PLATELET # BLD AUTO: 108 X10*3/UL (ref 150–450)
POTASSIUM SERPL-SCNC: 4.5 MMOL/L (ref 3.5–5.3)
PROT SERPL-MCNC: 4.9 G/DL (ref 6.4–8.2)
RBC # BLD AUTO: 3.57 X10*6/UL (ref 4.5–5.9)
SODIUM SERPL-SCNC: 139 MMOL/L (ref 136–145)
TACROLIMUS BLD-MCNC: 6.4 NG/ML
WBC # BLD AUTO: 4 X10*3/UL (ref 4.4–11.3)

## 2023-12-19 PROCEDURE — 99232 SBSQ HOSP IP/OBS MODERATE 35: CPT

## 2023-12-19 PROCEDURE — 2500000001 HC RX 250 WO HCPCS SELF ADMINISTERED DRUGS (ALT 637 FOR MEDICARE OP)

## 2023-12-19 PROCEDURE — 80197 ASSAY OF TACROLIMUS: CPT

## 2023-12-19 PROCEDURE — 36415 COLL VENOUS BLD VENIPUNCTURE: CPT

## 2023-12-19 PROCEDURE — 2500000002 HC RX 250 W HCPCS SELF ADMINISTERED DRUGS (ALT 637 FOR MEDICARE OP, ALT 636 FOR OP/ED)

## 2023-12-19 PROCEDURE — 83735 ASSAY OF MAGNESIUM: CPT

## 2023-12-19 PROCEDURE — 85025 COMPLETE CBC W/AUTO DIFF WBC: CPT

## 2023-12-19 PROCEDURE — 1100000001 HC PRIVATE ROOM DAILY

## 2023-12-19 PROCEDURE — 96372 THER/PROPH/DIAG INJ SC/IM: CPT

## 2023-12-19 PROCEDURE — 82947 ASSAY GLUCOSE BLOOD QUANT: CPT

## 2023-12-19 PROCEDURE — 2500000004 HC RX 250 GENERAL PHARMACY W/ HCPCS (ALT 636 FOR OP/ED)

## 2023-12-19 PROCEDURE — 2500000005 HC RX 250 GENERAL PHARMACY W/O HCPCS

## 2023-12-19 PROCEDURE — 80053 COMPREHEN METABOLIC PANEL: CPT

## 2023-12-19 PROCEDURE — 99233 SBSQ HOSP IP/OBS HIGH 50: CPT | Performed by: HOSPITALIST

## 2023-12-19 RX ORDER — INSULIN LISPRO 100 [IU]/ML
5 INJECTION, SOLUTION INTRAVENOUS; SUBCUTANEOUS ONCE
Status: COMPLETED | OUTPATIENT
Start: 2023-12-19 | End: 2023-12-19

## 2023-12-19 RX ORDER — CAPSAICIN 0.75 MG/G
CREAM TOPICAL 2 TIMES DAILY
Status: DISCONTINUED | OUTPATIENT
Start: 2023-12-19 | End: 2023-12-21 | Stop reason: HOSPADM

## 2023-12-19 RX ORDER — ACETAMINOPHEN 500 MG
5 TABLET ORAL NIGHTLY PRN
Status: DISCONTINUED | OUTPATIENT
Start: 2023-12-19 | End: 2023-12-21 | Stop reason: HOSPADM

## 2023-12-19 RX ADMIN — FERROUS SULFATE TAB 325 MG (65 MG ELEMENTAL FE) 1 TABLET: 325 (65 FE) TAB at 20:29

## 2023-12-19 RX ADMIN — HEPARIN SODIUM 5000 UNITS: 5000 INJECTION INTRAVENOUS; SUBCUTANEOUS at 08:09

## 2023-12-19 RX ADMIN — TACROLIMUS 2.5 MG: 0.5 CAPSULE ORAL at 08:07

## 2023-12-19 RX ADMIN — INSULIN GLARGINE 12 UNITS: 100 INJECTION, SOLUTION SUBCUTANEOUS at 20:33

## 2023-12-19 RX ADMIN — PREDNISONE 10 MG: 5 TABLET ORAL at 08:07

## 2023-12-19 RX ADMIN — INSULIN LISPRO 6 UNITS: 100 INJECTION, SOLUTION INTRAVENOUS; SUBCUTANEOUS at 17:51

## 2023-12-19 RX ADMIN — ACETAMINOPHEN 975 MG: 325 TABLET ORAL at 15:20

## 2023-12-19 RX ADMIN — AMLODIPINE BESYLATE 10 MG: 10 TABLET ORAL at 08:07

## 2023-12-19 RX ADMIN — CAPSAICIN: 0.75 CREAM TOPICAL at 22:26

## 2023-12-19 RX ADMIN — LOSARTAN POTASSIUM 25 MG: 25 TABLET, FILM COATED ORAL at 08:08

## 2023-12-19 RX ADMIN — CALCITRIOL CAPSULES 0.25 MCG 0.25 MCG: 0.25 CAPSULE ORAL at 08:08

## 2023-12-19 RX ADMIN — FERROUS SULFATE TAB 325 MG (65 MG ELEMENTAL FE) 1 TABLET: 325 (65 FE) TAB at 08:08

## 2023-12-19 RX ADMIN — PRAVASTATIN SODIUM 10 MG: 20 TABLET ORAL at 20:29

## 2023-12-19 RX ADMIN — HEPARIN SODIUM 5000 UNITS: 5000 INJECTION INTRAVENOUS; SUBCUTANEOUS at 01:45

## 2023-12-19 RX ADMIN — LIDOCAINE 1 PATCH: 4 PATCH TOPICAL at 08:07

## 2023-12-19 RX ADMIN — DOCUSATE SODIUM 100 MG: 100 CAPSULE, LIQUID FILLED ORAL at 08:08

## 2023-12-19 RX ADMIN — OXYCODONE HYDROCHLORIDE 5 MG: 5 TABLET ORAL at 08:07

## 2023-12-19 RX ADMIN — CARVEDILOL 37.5 MG: 25 TABLET, FILM COATED ORAL at 08:08

## 2023-12-19 RX ADMIN — HYDRALAZINE HYDROCHLORIDE 100 MG: 25 TABLET, FILM COATED ORAL at 15:20

## 2023-12-19 RX ADMIN — INSULIN LISPRO 5 UNITS: 100 INJECTION, SOLUTION INTRAVENOUS; SUBCUTANEOUS at 22:24

## 2023-12-19 RX ADMIN — LORATADINE 10 MG: 10 TABLET ORAL at 08:08

## 2023-12-19 RX ADMIN — TACROLIMUS 2.5 MG: 0.5 CAPSULE ORAL at 20:29

## 2023-12-19 RX ADMIN — ISOSORBIDE MONONITRATE 30 MG: 30 TABLET, EXTENDED RELEASE ORAL at 08:08

## 2023-12-19 RX ADMIN — HYDRALAZINE HYDROCHLORIDE 100 MG: 25 TABLET, FILM COATED ORAL at 08:07

## 2023-12-19 RX ADMIN — AZATHIOPRINE 50 MG: 50 TABLET ORAL at 08:08

## 2023-12-19 RX ADMIN — CINACALCET 30 MG: 30 TABLET, FILM COATED ORAL at 08:08

## 2023-12-19 RX ADMIN — TACROLIMUS: 1 OINTMENT TOPICAL at 09:00

## 2023-12-19 RX ADMIN — HYDRALAZINE HYDROCHLORIDE 100 MG: 25 TABLET, FILM COATED ORAL at 20:29

## 2023-12-19 RX ADMIN — TRAMADOL HYDROCHLORIDE 50 MG: 50 TABLET, COATED ORAL at 05:42

## 2023-12-19 RX ADMIN — TACROLIMUS: 1 OINTMENT TOPICAL at 20:40

## 2023-12-19 RX ADMIN — CARVEDILOL 37.5 MG: 25 TABLET, FILM COATED ORAL at 20:29

## 2023-12-19 RX ADMIN — FUROSEMIDE 40 MG: 10 INJECTION, SOLUTION INTRAVENOUS at 08:06

## 2023-12-19 RX ADMIN — FUROSEMIDE 40 MG: 10 INJECTION, SOLUTION INTRAVENOUS at 20:29

## 2023-12-19 RX ADMIN — INSULIN LISPRO 4 UNITS: 100 INJECTION, SOLUTION INTRAVENOUS; SUBCUTANEOUS at 08:20

## 2023-12-19 RX ADMIN — Medication 1000 UNITS: at 08:08

## 2023-12-19 ASSESSMENT — COGNITIVE AND FUNCTIONAL STATUS - GENERAL
MOBILITY SCORE: 24
MOBILITY SCORE: 24
DAILY ACTIVITIY SCORE: 24
DAILY ACTIVITIY SCORE: 24

## 2023-12-19 ASSESSMENT — PAIN DESCRIPTION - DESCRIPTORS: DESCRIPTORS: ACHING;DISCOMFORT

## 2023-12-19 ASSESSMENT — PAIN SCALES - GENERAL
PAINLEVEL_OUTOF10: 7
PAINLEVEL_OUTOF10: 4
PAINLEVEL_OUTOF10: 6
PAINLEVEL_OUTOF10: 7

## 2023-12-19 ASSESSMENT — PAIN - FUNCTIONAL ASSESSMENT
PAIN_FUNCTIONAL_ASSESSMENT: 0-10

## 2023-12-19 NOTE — NURSING NOTE
Mr Bashir is a 68 year old here with kidney infection  Pmh bilateral kidney transplants  Full hitesh on tramadol and oxycodone  Cam neg up out of bed  Sugars fairly well controlled

## 2023-12-19 NOTE — PROGRESS NOTES
Manolo Bashir is a 68 y.o. male on day 3 of admission presenting with Fluid overload, unspecified.      Subjective   No acute overnight events       Objective     Vitals 24HR  Heart Rate:  [66-78]   Temp:  [37.2 °C (99 °F)-37.3 °C (99.1 °F)]   Resp:  [18-22]   BP: (160-169)/(76-83)   SpO2:  [98 %-100 %]     General appearance: no distress  Eyes: non-icteric  HEENT: atrumatic head, PEERLA, moist mucosa  Skin: no apparent rash  Heart: NSR, S1, S2 normal, no murmur or gallop  Lungs: Basilar crackles  Abdomen: soft, nt/nd  Extremities: 2+ edema  Neuro: GCS 15/15    Intake/Output last 3 Shifts:    Intake/Output Summary (Last 24 hours) at 12/19/2023 1200  Last data filed at 12/19/2023 0740  Gross per 24 hour   Intake --   Output 500 ml   Net -500 ml       Scheduled medications  amLODIPine, 10 mg, oral, Daily  azaTHIOprine, 50 mg, oral, Daily  calcitriol, 0.25 mcg, oral, Daily  carvedilol, 37.5 mg, oral, BID  cholecalciferol, 1,000 Units, oral, Daily  cinacalcet, 30 mg, oral, Daily  docusate sodium, 100 mg, oral, Daily  ferrous sulfate (325 mg ferrous sulfate), 65 mg of iron, oral, BID  furosemide, 40 mg, intravenous, BID  heparin (porcine), 5,000 Units, subcutaneous, q8h  hydrALAZINE, 100 mg, oral, TID  insulin glargine, 12 Units, subcutaneous, Nightly  insulin lispro, 0-10 Units, subcutaneous, TID with meals  isosorbide mononitrate ER, 30 mg, oral, Daily  lidocaine, 1 patch, transdermal, Daily  loratadine, 10 mg, oral, Daily  losartan, 25 mg, oral, Daily  polyethylene glycol, 17 g, oral, Daily  pravastatin, 10 mg, oral, Nightly  predniSONE, 10 mg, oral, q AM  tacrolimus, 2.5 mg, oral, q12h ZOË  tacrolimus, , Topical, BID      Continuous medications     PRN medications  PRN medications: acetaminophen, dextrose, diclofenac sodium, glucagon, HYDROmorphone, ondansetron ODT, oxyCODONE, traMADol       Relevant Results    Results for orders placed or performed during the hospital encounter of 12/16/23 (from the past 24  hour(s))   POCT GLUCOSE   Result Value Ref Range    POCT Glucose 140 (H) 74 - 99 mg/dL   POCT GLUCOSE   Result Value Ref Range    POCT Glucose 223 (H) 74 - 99 mg/dL   Hepatitis panel, acute   Result Value Ref Range    Hepatitis B Surface AG Nonreactive Nonreactive    Hepatitis A  AB- IgM Nonreactive Nonreactive    Hepatitis B Core AB; IgM Nonreactive Nonreactive    Hepatitis C AB Reactive (A) Nonreactive   POCT GLUCOSE   Result Value Ref Range    POCT Glucose 331 (H) 74 - 99 mg/dL   CBC and Auto Differential   Result Value Ref Range    WBC 4.0 (L) 4.4 - 11.3 x10*3/uL    nRBC 0.0 0.0 - 0.0 /100 WBCs    RBC 3.57 (L) 4.50 - 5.90 x10*6/uL    Hemoglobin 10.2 (L) 13.5 - 17.5 g/dL    Hematocrit 31.2 (L) 41.0 - 52.0 %    MCV 87 80 - 100 fL    MCH 28.6 26.0 - 34.0 pg    MCHC 32.7 32.0 - 36.0 g/dL    RDW 14.2 11.5 - 14.5 %    Platelets 108 (L) 150 - 450 x10*3/uL    Neutrophils % 66.9 40.0 - 80.0 %    Immature Granulocytes %, Automated 1.2 (H) 0.0 - 0.9 %    Lymphocytes % 19.7 13.0 - 44.0 %    Monocytes % 9.7 2.0 - 10.0 %    Eosinophils % 2.0 0.0 - 6.0 %    Basophils % 0.5 0.0 - 2.0 %    Neutrophils Absolute 2.68 1.20 - 7.70 x10*3/uL    Immature Granulocytes Absolute, Automated 0.05 0.00 - 0.70 x10*3/uL    Lymphocytes Absolute 0.79 (L) 1.20 - 4.80 x10*3/uL    Monocytes Absolute 0.39 0.10 - 1.00 x10*3/uL    Eosinophils Absolute 0.08 0.00 - 0.70 x10*3/uL    Basophils Absolute 0.02 0.00 - 0.10 x10*3/uL   Comprehensive metabolic panel   Result Value Ref Range    Glucose 241 (H) 74 - 99 mg/dL    Sodium 139 136 - 145 mmol/L    Potassium 4.5 3.5 - 5.3 mmol/L    Chloride 108 (H) 98 - 107 mmol/L    Bicarbonate 27 21 - 32 mmol/L    Anion Gap 9 (L) 10 - 20 mmol/L    Urea Nitrogen 44 (H) 6 - 23 mg/dL    Creatinine 3.49 (H) 0.50 - 1.30 mg/dL    eGFR 18 (L) >60 mL/min/1.73m*2    Calcium 8.4 (L) 8.6 - 10.6 mg/dL    Albumin 2.6 (L) 3.4 - 5.0 g/dL    Alkaline Phosphatase 67 33 - 136 U/L    Total Protein 4.9 (L) 6.4 - 8.2 g/dL    AST 11 9 - 39  U/L    Bilirubin, Total 0.2 0.0 - 1.2 mg/dL    ALT 13 10 - 52 U/L   Magnesium   Result Value Ref Range    Magnesium 1.77 1.60 - 2.40 mg/dL   POCT GLUCOSE   Result Value Ref Range    POCT Glucose 205 (H) 74 - 99 mg/dL   POCT GLUCOSE   Result Value Ref Range    POCT Glucose 110 (H) 74 - 99 mg/dL        Assessment/Plan     Mr Bashir is a 67yo M with PMH ESRD (on HD [id number]), s/p 2x renal transplants (1992, 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     #Allograft function  -Renal function stable with Cr 3.5 today   -Urine output- not being accurately charted. None documented yesterday, 500cc thus far today     -Renal biopsy (Nov 2023):  no evidence of active ACR or ABMR, did show focal proliferative GN with 1 cellular crescent, . IF showed 1+ staining for IgM and C3.  Consistent with immune complex glomerulonephritis. also noted was severe arteriolar hyalinosis and arteriosclerosis.     -UA: 2+ Protein     Suspect that GN is due to MGUS  Hep C antibody positive, patient says he completed treatment in the past   Planning for treatment with Rituximab     Recommendations:  -Await Hep C quantitative RNA- if Hep C has been treated, will still be a candidate for Rituximab. Can proceed if HCV PCR negative .  -Oncology clearance for Rituximab    -Lasix 40mg IV bid   -Check spot urine total protein: creatinine ratio     #Immunosuppression  -Azathioprine 50mg po once daily  -Prednisone 10mg po once daily   -Tacrolimus 2.5mg po q12h   -Check Tacrolimus trough daily. Goal Tac trough 5-8     #HTN  -BP elevated  -Amlodipine 10mg po once daily  -Carvedilol 37.5mg po bid   -Hydralazine 100mg po tid    -Losartan 25mg po once daily     #Acid base  -Stable, HCO3 27    #Electrolytes  -K 4.5    #CKD-MBD  -Calcitriol 0.25mcg po daily  -Vit D3 1000 units po daily  -Cinacalcet 30mg po once daily    #CKD- Anemia  -Continue oral iron  -HGB 9.8     SW attending Dr Dina Kidd MD  Nephrology fellow PGY4   Transplant nephrology pager [phone]

## 2023-12-19 NOTE — CARE PLAN
The patient's goals for the shift include      The clinical goals for the shift include Pt pain will be managed this shift

## 2023-12-19 NOTE — PROGRESS NOTES
"Manolo Bashir is a 68 y.o. male on day 3 of admission presenting with Fluid overload, unspecified.    Subjective   No acute events were reported overnight. Pt was seen at bedside. Said that his sleep was limited by his chronic back pain. He was told that we would try and include some additional measures to help like Capsicin cream and melatonin to help with sleep. His scrotal swelling continues to go down. He had good appetite and had been able to have a BM. He denied any other complaints of f/c, n/v, chest pain, increase in abdominal pain, dysuria.    Objective:  VS:  /83   Pulse 78   Temp 37.2 °C (99 °F)   Resp 22   Ht 1.727 m (5' 8\")   Wt 91.6 kg (202 lb)   SpO2 98%   BMI 30.71 kg/m²     Physical Exam:  General: Awake, alert, conversant, appears stated age  HEENT: Pupils equal and round, no scleral icterus or conjunctivitis  Skin: Anasarca, raised spots over face, L UE arm bumps of old fistula  Chest: Ctab, normal respiratory effort, not on supplemental oxygen  Cardiac: Regular rate and rhythm, normal s1, s2, no M/R/G, no JVD  Abdomen: Enlarged, moderately tight, NT, no involuntary guarding  : No flank pain or indwelling urinary catheter  EXT: General edema to b/l U and L extremities w/ R>L  MSK: No focal joint swelling noted  Neuro: AOx4, moving all limbs spontaneously, follows commands  Psych: Coherent thought process, appropriate mood and affect    Labs:  Results from last 7 days   Lab Units 12/19/23  0541 12/18/23  0845 12/17/23  0721   WBC AUTO x10*3/uL 4.0* 3.9* 4.1*   HEMOGLOBIN g/dL 10.2* 9.8* 10.4*   HEMATOCRIT % 31.2* 29.2* 31.8*   PLATELETS AUTO x10*3/uL 108* 91* 116*   NEUTROS PCT AUTO % 66.9 63.4 60.3   LYMPHS PCT AUTO % 19.7 21.9 25.2   MONOS PCT AUTO % 9.7 10.8 10.4   EOS PCT AUTO % 2.0 2.6 3.2     Results from last 7 days   Lab Units 12/19/23  0541 12/18/23  0845 12/17/23  0721   SODIUM mmol/L 139 141 143   POTASSIUM mmol/L 4.5 4.3 4.1   CHLORIDE mmol/L 108* 111* 113*   CO2 mmol/L " 27 26 24   BUN mg/dL 44* 41* 38*   CREATININE mg/dL 3.49* 3.53* 3.43*   CALCIUM mg/dL 8.4* 8.7 8.9   PROTEIN TOTAL g/dL 4.9* 4.6* 4.6*   BILIRUBIN TOTAL mg/dL 0.2 0.3 0.3   ALK PHOS U/L 67 57 53   ALT U/L 13 14 12   AST U/L 11 13 13   GLUCOSE mg/dL 241* 169* 55*     Results from last 7 days   Lab Units 12/19/23  0541 12/18/23  0845 12/17/23  0721   MAGNESIUM mg/dL 1.77 1.89 1.89       Imaging:  None     Meds:  Scheduled medications  amLODIPine, 10 mg, oral, Daily  azaTHIOprine, 50 mg, oral, Daily  calcitriol, 0.25 mcg, oral, Daily  carvedilol, 37.5 mg, oral, BID  cholecalciferol, 1,000 Units, oral, Daily  cinacalcet, 30 mg, oral, Daily  docusate sodium, 100 mg, oral, Daily  ferrous sulfate (325 mg ferrous sulfate), 65 mg of iron, oral, BID  furosemide, 40 mg, intravenous, BID  heparin (porcine), 5,000 Units, subcutaneous, q8h  hydrALAZINE, 100 mg, oral, TID  insulin glargine, 12 Units, subcutaneous, Nightly  insulin lispro, 0-10 Units, subcutaneous, TID with meals  isosorbide mononitrate ER, 30 mg, oral, Daily  lidocaine, 1 patch, transdermal, Daily  loratadine, 10 mg, oral, Daily  losartan, 25 mg, oral, Daily  polyethylene glycol, 17 g, oral, Daily  pravastatin, 10 mg, oral, Nightly  predniSONE, 10 mg, oral, q AM  tacrolimus, 2.5 mg, oral, q12h ZOË  tacrolimus, , Topical, BID    Continuous medications     PRN medications  PRN medications: acetaminophen, dextrose, diclofenac sodium, glucagon, HYDROmorphone, ondansetron ODT, oxyCODONE, traMADol    Assessment:  Pt is an 69 y/o M w/ a PMH of ESRD s/p formerly on HD then 2x renal transplants (1992, 2013, currently on prednisone, tacrolimus, azathioprine, last baseline Cr 2.5-2.8), pancytopenia, angina (last EF 60-65% 2/201), IDDM2 (last A1c 6.0 % 10/26), HTN, prostate cancer s/p radical prostatectomy, HCV (treated and RNA not detected last 10/18/23) now w/ multiple presentations for anasarca and poor urine output iso of transplant kidney. Unclear etiology. Prior success  in diuresing w/ IV Lasix. Plan for Urine electrolytes to assess FENa and IV Lasix diureses to monitor for interval improvement. Will consult Nephrology and Renal transplant team for further medication adjustments whilst inpt to assess success s/p d/charge given multiple recurrences.      Updates:  12/19:  - Continue with current Lasix regimen  - Renal transplant to add Rituximab 1g IV pending clearance from Oncology. Oncology team are aware of pt.   - Plan to check spot urine/creatinine ratio  - Added melatonin for sleep and Capsaicin for pain    12/18:  - Still w/ diureses for 40mg IV Lasix BID. Some Cr worsening, 3.43 ->3.53  - BP elevated and per Transplant Nephrology recs increased Hydralazine to 100mg TID  - Drop across 3 CBC cell lines WBC 3.9 Hgb 9.8 Plt 91. Likely dilutional.   - Added Calcitriol given 2dary parathyroidism in setting of CKD and worsening kidney function    12/17:  - Slight worsening of ISIAH, Cr 3.11 to 3.43  - Transplant Nephrology consulted w/ recs as follows:  -Would continue Lasix 40 mg IV twice daily.  Titrate dose based on response.   -Can consider increasing hydralazine to 100 mg 3 times daily if indicated - given high BPs  -Consult Endocrine for hyperparathyroidism  -Check FK trough levels daily.  Last FK trough 7.4, goal 5-8  -Increase Imuran to 50 mg/day   -Prednisone 10 mg/day    Plan:  #Anasarca, scrotal swelling   :: Likely driven by increased hydrostatic pressure secondary to renal failure and low albumin causing low oncotic pressure.  :: Not likely heart failure given LV of 60 to 65% on last echo in November, however BNP elevated to 298  :: No suggestive findings of cirrhosis  :: Patient was sent home on furosemide 20mg PO; unclear of medication adherence given use parameters   :: Patient was given IV Lasix 40 mg with about 300 cc urine output. Given spot IV Lasix 80mg for diuresis in past admission w/ success  :: Patient is stable and not dyspneic on room air  - Strict I/Os   -  Scrotal care   - CTM volume status and RFPs     #ISIAH on CKD3   #Transplant kidney care  :: S/p kidney transplant x2  :: Last FENa 1.3%, plan for repeat assessment  :: Baseline creatinine 2.5-3  :: Patient was discharged with a creatinine of 3.5  :: On admission creatinine 3.1  :: Prelim biopsy done on last admission showing focal proliferative glomerulonephritis  - Will consult Transplant Nephrology; recs appreciated  -Continue Lasix 40 mg IV twice daily   -Can consider increasing hydralazine to 100 mg 3 times daily if indicated - given high BPs  -Endocrine consult for hyperparathyroidism on d/charge  -Continue tacrolimus 2.5mg Check FK trough levels daily.  Last FK trough 7.4, goal 5-8  -Increased azathioprine to 50 mg/day   -Prednisone 10 mg/day  - Continue home cinacalcet 30 mg  - Added calcitriol  - F/up Urine Lytes for FENa  - Avoid nephrotoxic agents, NSAIDs  - F/u final renal biopsy results from [facility]  - Avoid nephrotoxic drugs  - Renally dose all meds       #HTN  #HLD  - Increased home Hydralazine 100mg PO TID  - Continue home Amlodipine 10mg PO daily  - Continue home Carvedilol 37.5mg PO BID   - Continue home Losartan 25mg PO daily   - Started Isosorbide Mononitrate 30mg PO daily  - CTM Bps and titrate meds accordingly      #Hx of Back pain  ::MRI 09/19/2023 showing nonspecific focus of abnormal signal in R pedicle of L4 c/w osseous hemangioma, stress fracture, or osteoid osteoma. No discitis or osteomyelitis. No evidence of metastasis.  - Tylenol 975 mg q8h PRN mild - moderate pain, Tramadol 50mg q12 PRN for severe pain. Oxycodone 5mg PO q6h for BT pain  - Lidocaine patch     #MGUS  #Pancytopenia  :: Bone marrow biopsy done 10/26/2023 showing hypercellular bone marrow for 50% with maturing trilineage hematopoiesis and involved 5% by plasma cell neoplasm, FISH negative for gain of function mutation, hyper triploidy, rearrangement of IgH, deletion of TP53.  :: Skeletal survey on 11/19/2020 negative for  osteolytic lesions  :: Likely chronic in nature  :: 12/16 WBC 4 Hgb 10.3 Plt 109  - CTM daily CBCs  - To continue mgmt w/ outpatient hematology     #IDDM  - Insulin glargine 12u qAM  - Moderate SSI    #Hyperparathyroidism   :: Likely 2dary given High PTH and normal Corrected Ca and Phosph  - Start Calcitriol 0.25mcg daily  - Endocrine consult for hyperparathyroidism on d/charge    F: PRN  E: PRN  N; Low Na diet  A: PIV     GI Ppx: None   DVT PPx: SQH     Code Status: Full Code  NOK/MDM Amalia Enriquez (significant other): [phone]      Chavez Goins MD

## 2023-12-20 DIAGNOSIS — Z94.0 KIDNEY REPLACED BY TRANSPLANT (HHS-HCC): ICD-10-CM

## 2023-12-20 LAB
ALBUMIN SERPL BCP-MCNC: 2.4 G/DL (ref 3.4–5)
ALP SERPL-CCNC: 58 U/L (ref 33–136)
ALT SERPL W P-5'-P-CCNC: 10 U/L (ref 10–52)
ANION GAP SERPL CALC-SCNC: 7 MMOL/L (ref 10–20)
AST SERPL W P-5'-P-CCNC: 12 U/L (ref 9–39)
BASOPHILS # BLD AUTO: 0.02 X10*3/UL (ref 0–0.1)
BASOPHILS NFR BLD AUTO: 0.5 %
BILIRUB SERPL-MCNC: 0.2 MG/DL (ref 0–1.2)
BUN SERPL-MCNC: 44 MG/DL (ref 6–23)
CALCIUM SERPL-MCNC: 8.3 MG/DL (ref 8.6–10.6)
CHLORIDE SERPL-SCNC: 109 MMOL/L (ref 98–107)
CHLORIDE UR-SCNC: 121 MMOL/L
CHLORIDE/CREATININE (MMOL/G) IN URINE: 159 MMOL/G CREAT (ref 23–275)
CO2 SERPL-SCNC: 27 MMOL/L (ref 21–32)
CREAT SERPL-MCNC: 3.59 MG/DL (ref 0.5–1.3)
CREAT UR-MCNC: 76 MG/DL (ref 20–370)
CREAT UR-MCNC: 76 MG/DL (ref 20–370)
EOSINOPHIL # BLD AUTO: 0.1 X10*3/UL (ref 0–0.7)
EOSINOPHIL NFR BLD AUTO: 2.7 %
ERYTHROCYTE [DISTWIDTH] IN BLOOD BY AUTOMATED COUNT: 14.2 % (ref 11.5–14.5)
GFR SERPL CREATININE-BSD FRML MDRD: 18 ML/MIN/1.73M*2
GLUCOSE BLD MANUAL STRIP-MCNC: 144 MG/DL (ref 74–99)
GLUCOSE BLD MANUAL STRIP-MCNC: 191 MG/DL (ref 74–99)
GLUCOSE BLD MANUAL STRIP-MCNC: 234 MG/DL (ref 74–99)
GLUCOSE BLD MANUAL STRIP-MCNC: 252 MG/DL (ref 74–99)
GLUCOSE SERPL-MCNC: 162 MG/DL (ref 74–99)
HCT VFR BLD AUTO: 30.2 % (ref 41–52)
HGB BLD-MCNC: 10.1 G/DL (ref 13.5–17.5)
IMM GRANULOCYTES # BLD AUTO: 0.04 X10*3/UL (ref 0–0.7)
IMM GRANULOCYTES NFR BLD AUTO: 1.1 % (ref 0–0.9)
LYMPHOCYTES # BLD AUTO: 0.89 X10*3/UL (ref 1.2–4.8)
LYMPHOCYTES NFR BLD AUTO: 23.7 %
MAGNESIUM SERPL-MCNC: 1.77 MG/DL (ref 1.6–2.4)
MCH RBC QN AUTO: 29 PG (ref 26–34)
MCHC RBC AUTO-ENTMCNC: 33.4 G/DL (ref 32–36)
MCV RBC AUTO: 87 FL (ref 80–100)
MONOCYTES # BLD AUTO: 0.4 X10*3/UL (ref 0.1–1)
MONOCYTES NFR BLD AUTO: 10.7 %
NEUTROPHILS # BLD AUTO: 2.3 X10*3/UL (ref 1.2–7.7)
NEUTROPHILS NFR BLD AUTO: 61.3 %
NRBC BLD-RTO: 0 /100 WBCS (ref 0–0)
PLATELET # BLD AUTO: 113 X10*3/UL (ref 150–450)
POTASSIUM SERPL-SCNC: 4.3 MMOL/L (ref 3.5–5.3)
POTASSIUM UR-SCNC: 34 MMOL/L
POTASSIUM/CREAT UR-RTO: 45 MMOL/G CREAT
PROT SERPL-MCNC: 4.8 G/DL (ref 6.4–8.2)
PROT UR-ACNC: 397 MG/DL (ref 5–25)
PROT/CREAT UR: 5.22 MG/MG CREAT (ref 0–0.17)
RBC # BLD AUTO: 3.48 X10*6/UL (ref 4.5–5.9)
SODIUM SERPL-SCNC: 139 MMOL/L (ref 136–145)
SODIUM UR-SCNC: 102 MMOL/L
SODIUM/CREAT UR-RTO: 134 MMOL/G CREAT
TACROLIMUS BLD-MCNC: 6.7 NG/ML
WBC # BLD AUTO: 3.8 X10*3/UL (ref 4.4–11.3)

## 2023-12-20 PROCEDURE — 80053 COMPREHEN METABOLIC PANEL: CPT

## 2023-12-20 PROCEDURE — 99233 SBSQ HOSP IP/OBS HIGH 50: CPT | Performed by: HOSPITALIST

## 2023-12-20 PROCEDURE — 2500000004 HC RX 250 GENERAL PHARMACY W/ HCPCS (ALT 636 FOR OP/ED)

## 2023-12-20 PROCEDURE — 2500000001 HC RX 250 WO HCPCS SELF ADMINISTERED DRUGS (ALT 637 FOR MEDICARE OP)

## 2023-12-20 PROCEDURE — 80197 ASSAY OF TACROLIMUS: CPT

## 2023-12-20 PROCEDURE — 99231 SBSQ HOSP IP/OBS SF/LOW 25: CPT

## 2023-12-20 PROCEDURE — 84133 ASSAY OF URINE POTASSIUM: CPT

## 2023-12-20 PROCEDURE — 83735 ASSAY OF MAGNESIUM: CPT

## 2023-12-20 PROCEDURE — 36415 COLL VENOUS BLD VENIPUNCTURE: CPT

## 2023-12-20 PROCEDURE — 82947 ASSAY GLUCOSE BLOOD QUANT: CPT

## 2023-12-20 PROCEDURE — 84156 ASSAY OF PROTEIN URINE: CPT

## 2023-12-20 PROCEDURE — 2500000005 HC RX 250 GENERAL PHARMACY W/O HCPCS

## 2023-12-20 PROCEDURE — 1100000001 HC PRIVATE ROOM DAILY

## 2023-12-20 PROCEDURE — 85025 COMPLETE CBC W/AUTO DIFF WBC: CPT

## 2023-12-20 PROCEDURE — 96372 THER/PROPH/DIAG INJ SC/IM: CPT

## 2023-12-20 PROCEDURE — 2500000002 HC RX 250 W HCPCS SELF ADMINISTERED DRUGS (ALT 637 FOR MEDICARE OP, ALT 636 FOR OP/ED)

## 2023-12-20 RX ORDER — DIPHENHYDRAMINE HCL 25 MG
25 CAPSULE ORAL ONCE
Status: COMPLETED | OUTPATIENT
Start: 2023-12-20 | End: 2023-12-20

## 2023-12-20 RX ORDER — ALBUTEROL SULFATE 0.83 MG/ML
3 SOLUTION RESPIRATORY (INHALATION) AS NEEDED
OUTPATIENT
Start: 2024-01-04

## 2023-12-20 RX ORDER — EPINEPHRINE 0.3 MG/.3ML
0.3 INJECTION SUBCUTANEOUS EVERY 5 MIN PRN
OUTPATIENT
Start: 2024-01-04

## 2023-12-20 RX ORDER — DIPHENHYDRAMINE HYDROCHLORIDE 50 MG/ML
50 INJECTION INTRAMUSCULAR; INTRAVENOUS AS NEEDED
OUTPATIENT
Start: 2024-01-04

## 2023-12-20 RX ORDER — SULFAMETHOXAZOLE AND TRIMETHOPRIM 200; 40 MG/5ML; MG/5ML
80 SUSPENSION ORAL EVERY 12 HOURS SCHEDULED
Status: DISCONTINUED | OUTPATIENT
Start: 2023-12-20 | End: 2023-12-21

## 2023-12-20 RX ORDER — ACETAMINOPHEN 325 MG/1
650 TABLET ORAL ONCE
Status: COMPLETED | OUTPATIENT
Start: 2023-12-20 | End: 2023-12-20

## 2023-12-20 RX ORDER — FAMOTIDINE 10 MG/ML
20 INJECTION INTRAVENOUS ONCE AS NEEDED
OUTPATIENT
Start: 2024-01-04

## 2023-12-20 RX ADMIN — CINACALCET 30 MG: 30 TABLET, FILM COATED ORAL at 09:11

## 2023-12-20 RX ADMIN — METHYLPREDNISOLONE SODIUM SUCCINATE 100 MG: 125 INJECTION, POWDER, FOR SOLUTION INTRAMUSCULAR; INTRAVENOUS at 21:03

## 2023-12-20 RX ADMIN — AMLODIPINE BESYLATE 10 MG: 10 TABLET ORAL at 09:10

## 2023-12-20 RX ADMIN — LORATADINE 10 MG: 10 TABLET ORAL at 09:11

## 2023-12-20 RX ADMIN — CALCITRIOL CAPSULES 0.25 MCG 0.25 MCG: 0.25 CAPSULE ORAL at 09:10

## 2023-12-20 RX ADMIN — LOSARTAN POTASSIUM 25 MG: 25 TABLET, FILM COATED ORAL at 09:11

## 2023-12-20 RX ADMIN — ISOSORBIDE MONONITRATE 30 MG: 30 TABLET, EXTENDED RELEASE ORAL at 09:11

## 2023-12-20 RX ADMIN — CARVEDILOL 37.5 MG: 25 TABLET, FILM COATED ORAL at 09:10

## 2023-12-20 RX ADMIN — DOCUSATE SODIUM 100 MG: 100 CAPSULE, LIQUID FILLED ORAL at 09:10

## 2023-12-20 RX ADMIN — PREDNISONE 10 MG: 5 TABLET ORAL at 09:11

## 2023-12-20 RX ADMIN — FERROUS SULFATE TAB 325 MG (65 MG ELEMENTAL FE) 1 TABLET: 325 (65 FE) TAB at 09:10

## 2023-12-20 RX ADMIN — FUROSEMIDE 40 MG: 10 INJECTION, SOLUTION INTRAVENOUS at 21:33

## 2023-12-20 RX ADMIN — SULFAMETHOXAZOLE AND TRIMETHOPRIM 80 MG OF TRIMETHOPRIM: 200; 40 SUSPENSION ORAL at 21:33

## 2023-12-20 RX ADMIN — INSULIN LISPRO 6 UNITS: 100 INJECTION, SOLUTION INTRAVENOUS; SUBCUTANEOUS at 19:03

## 2023-12-20 RX ADMIN — AZATHIOPRINE 50 MG: 50 TABLET ORAL at 09:10

## 2023-12-20 RX ADMIN — INSULIN GLARGINE 12 UNITS: 100 INJECTION, SOLUTION SUBCUTANEOUS at 21:38

## 2023-12-20 RX ADMIN — TRAMADOL HYDROCHLORIDE 50 MG: 50 TABLET, COATED ORAL at 09:11

## 2023-12-20 RX ADMIN — SODIUM CHLORIDE 1000 MG: 9 INJECTION, SOLUTION INTRAVENOUS at 21:48

## 2023-12-20 RX ADMIN — TACROLIMUS: 1 OINTMENT TOPICAL at 21:50

## 2023-12-20 RX ADMIN — FUROSEMIDE 40 MG: 10 INJECTION, SOLUTION INTRAVENOUS at 09:11

## 2023-12-20 RX ADMIN — OXYCODONE HYDROCHLORIDE 5 MG: 5 TABLET ORAL at 13:12

## 2023-12-20 RX ADMIN — DIPHENHYDRAMINE HYDROCHLORIDE 25 MG: 25 CAPSULE ORAL at 21:03

## 2023-12-20 RX ADMIN — LIDOCAINE 1 PATCH: 4 PATCH TOPICAL at 09:13

## 2023-12-20 RX ADMIN — TACROLIMUS: 1 OINTMENT TOPICAL at 09:14

## 2023-12-20 RX ADMIN — TACROLIMUS 2.5 MG: 0.5 CAPSULE ORAL at 19:07

## 2023-12-20 RX ADMIN — HYDRALAZINE HYDROCHLORIDE 100 MG: 25 TABLET, FILM COATED ORAL at 17:31

## 2023-12-20 RX ADMIN — OXYCODONE HYDROCHLORIDE 5 MG: 5 TABLET ORAL at 02:56

## 2023-12-20 RX ADMIN — HYDRALAZINE HYDROCHLORIDE 100 MG: 25 TABLET, FILM COATED ORAL at 23:58

## 2023-12-20 RX ADMIN — Medication 1000 UNITS: at 09:11

## 2023-12-20 RX ADMIN — ACETAMINOPHEN 650 MG: 325 TABLET ORAL at 21:03

## 2023-12-20 RX ADMIN — HEPARIN SODIUM 5000 UNITS: 5000 INJECTION INTRAVENOUS; SUBCUTANEOUS at 17:31

## 2023-12-20 RX ADMIN — CAPSAICIN: 0.75 CREAM TOPICAL at 21:50

## 2023-12-20 RX ADMIN — TACROLIMUS 2.5 MG: 0.5 CAPSULE ORAL at 09:10

## 2023-12-20 RX ADMIN — ONDANSETRON 4 MG: 4 TABLET, ORALLY DISINTEGRATING ORAL at 22:44

## 2023-12-20 RX ADMIN — HYDRALAZINE HYDROCHLORIDE 100 MG: 25 TABLET, FILM COATED ORAL at 09:10

## 2023-12-20 RX ADMIN — CARVEDILOL 37.5 MG: 25 TABLET, FILM COATED ORAL at 21:33

## 2023-12-20 RX ADMIN — CAPSAICIN: 0.75 CREAM TOPICAL at 09:13

## 2023-12-20 RX ADMIN — HEPARIN SODIUM 5000 UNITS: 5000 INJECTION INTRAVENOUS; SUBCUTANEOUS at 09:11

## 2023-12-20 RX ADMIN — FERROUS SULFATE TAB 325 MG (65 MG ELEMENTAL FE) 1 TABLET: 325 (65 FE) TAB at 21:33

## 2023-12-20 RX ADMIN — PRAVASTATIN SODIUM 10 MG: 20 TABLET ORAL at 21:33

## 2023-12-20 ASSESSMENT — COGNITIVE AND FUNCTIONAL STATUS - GENERAL
DAILY ACTIVITIY SCORE: 24
MOBILITY SCORE: 24

## 2023-12-20 ASSESSMENT — PAIN - FUNCTIONAL ASSESSMENT
PAIN_FUNCTIONAL_ASSESSMENT: UNABLE TO SELF-REPORT
PAIN_FUNCTIONAL_ASSESSMENT: 0-10
PAIN_FUNCTIONAL_ASSESSMENT: 0-10

## 2023-12-20 ASSESSMENT — PAIN DESCRIPTION - LOCATION
LOCATION: BACK
LOCATION: BACK

## 2023-12-20 ASSESSMENT — PAIN SCALES - GENERAL
PAINLEVEL_OUTOF10: 8
PAINLEVEL_OUTOF10: 8
PAINLEVEL_OUTOF10: 7
PAINLEVEL_OUTOF10: 8
PAINLEVEL_OUTOF10: 5 - MODERATE PAIN

## 2023-12-20 ASSESSMENT — PAIN DESCRIPTION - ORIENTATION: ORIENTATION: LOWER

## 2023-12-20 NOTE — PROGRESS NOTES
"Manolo Bashir is a 68 y.o. male on day 4 of admission presenting with Fluid overload, unspecified.    Subjective   No acute events were reported overnight. Pt was seen at bedside. Said that his sleep was less restful from his chronic back pain. Mentions that the hospital mattress may be contributing to this as his positioning does not help. He has not used much of his breakthrough pain meds and was told to do this. Otherwise he felt improved and that his generalized swelling and scrotal swelling felt reduced.   He had good appetite and had been able to have a BM. He denied any other complaints of f/c, n/v, chest pain, increase in abdominal pain, dysuria.    Objective:  VS:  /81   Pulse 70   Temp 36.1 °C (97 °F) (Temporal)   Resp 18   Ht 1.727 m (5' 8\")   Wt 91.6 kg (202 lb)   SpO2 100%   BMI 30.71 kg/m²     Physical Exam:  General: Awake, alert, conversant, appears stated age  HEENT: Pupils equal and round, no scleral icterus or conjunctivitis  Skin: Anasarca, raised spots over face, L UE arm bumps of old fistula  Chest: Ctab, normal respiratory effort, not on supplemental oxygen  Cardiac: Regular rate and rhythm, normal s1, s2, no M/R/G, no JVD  Abdomen: Enlarged, moderately tight, NT, no involuntary guarding  : No flank pain or indwelling urinary catheter  EXT: General edema to b/l U and L extremities w/ R>L, now less than previously seen   MSK: No focal joint swelling noted  Neuro: AOx4, moving all limbs spontaneously, follows commands  Psych: Coherent thought process, appropriate mood and affect    Labs:  Results from last 7 days   Lab Units 12/20/23  0703 12/19/23  0541 12/18/23  0845   WBC AUTO x10*3/uL 3.8* 4.0* 3.9*   HEMOGLOBIN g/dL 10.1* 10.2* 9.8*   HEMATOCRIT % 30.2* 31.2* 29.2*   PLATELETS AUTO x10*3/uL 113* 108* 91*   NEUTROS PCT AUTO % 61.3 66.9 63.4   LYMPHS PCT AUTO % 23.7 19.7 21.9   MONOS PCT AUTO % 10.7 9.7 10.8   EOS PCT AUTO % 2.7 2.0 2.6     Results from last 7 days   Lab Units " 12/20/23  0703 12/19/23  0541 12/18/23  0845   SODIUM mmol/L 139 139 141   POTASSIUM mmol/L 4.3 4.5 4.3   CHLORIDE mmol/L 109* 108* 111*   CO2 mmol/L 27 27 26   BUN mg/dL 44* 44* 41*   CREATININE mg/dL 3.59* 3.49* 3.53*   CALCIUM mg/dL 8.3* 8.4* 8.7   PROTEIN TOTAL g/dL 4.8* 4.9* 4.6*   BILIRUBIN TOTAL mg/dL 0.2 0.2 0.3   ALK PHOS U/L 58 67 57   ALT U/L 10 13 14   AST U/L 12 11 13   GLUCOSE mg/dL 162* 241* 169*     Results from last 7 days   Lab Units 12/20/23  0703 12/19/23  0541 12/18/23  0845   MAGNESIUM mg/dL 1.77 1.77 1.89      Imaging:  None     Meds:  Scheduled medications  acetaminophen, 650 mg, oral, Once  amLODIPine, 10 mg, oral, Daily  azaTHIOprine, 50 mg, oral, Daily  calcitriol, 0.25 mcg, oral, Daily  capsicum, , Topical, BID  carvedilol, 37.5 mg, oral, BID  cholecalciferol, 1,000 Units, oral, Daily  cinacalcet, 30 mg, oral, Daily  diphenhydrAMINE, 25 mg, oral, Once  docusate sodium, 100 mg, oral, Daily  ferrous sulfate (325 mg ferrous sulfate), 65 mg of iron, oral, BID  furosemide, 40 mg, intravenous, BID  heparin (porcine), 5,000 Units, subcutaneous, q8h  hydrALAZINE, 100 mg, oral, TID  insulin glargine, 12 Units, subcutaneous, Nightly  insulin lispro, 0-10 Units, subcutaneous, TID with meals  isosorbide mononitrate ER, 30 mg, oral, Daily  lidocaine, 1 patch, transdermal, Daily  loratadine, 10 mg, oral, Daily  losartan, 25 mg, oral, Daily  methylPREDNISolone sodium succinate (PF), 100 mg, intravenous, Once  polyethylene glycol, 17 g, oral, Daily  pravastatin, 10 mg, oral, Nightly  predniSONE, 10 mg, oral, q AM  riTUXimab or biosimilar, 1,000 mg, intravenous, Once  sulfamethoxazole-trimethoprim, 80 mg of trimethoprim, oral, q12h ZOË  tacrolimus, 2.5 mg, oral, q12h ZOË  tacrolimus, , Topical, BID    Continuous medications     PRN medications  PRN medications: acetaminophen, dextrose, diclofenac sodium, glucagon, HYDROmorphone, melatonin, ondansetron ODT, oxyCODONE, traMADol      Assessment:  Pt is an 68  y/o M w/ a PMH of ESRD s/p formerly on HD then 2x renal transplants (1992, 2013, currently on prednisone, tacrolimus, azathioprine, last baseline Cr 2.5-2.8), pancytopenia, angina (last EF 60-65% 2/201), IDDM2 (last A1c 6.0 % 10/26), HTN, prostate cancer s/p radical prostatectomy, HCV (treated and RNA not detected last 10/18/23) now w/ multiple presentations for anasarca and poor urine output iso of transplant kidney. Unclear etiology. Prior success in diuresing w/ IV Lasix. Plan for Urine electrolytes to assess FENa and IV Lasix diureses to monitor for interval improvement. Will consult Nephrology and Renal transplant team for further medication adjustments whilst inpt to assess success s/p d/charge given multiple recurrences.      Updates:  12/20:  - Rituximab 1g IV and next dose in 2 weeks started per Transplant nephrology recs  - Ppx SS Bactrim started for 6 months     12/19:  - Continue with current Lasix regimen  - Renal transplant to add Rituximab 1g IV pending clearance from Oncology. Oncology team are aware of pt.   - Plan to check spot urine/creatinine ratio  - Added melatonin for sleep and Capsaicin for pain    12/18:  - Still w/ diureses for 40mg IV Lasix BID. Some Cr worsening, 3.43 ->3.53  - BP elevated and per Transplant Nephrology recs increased Hydralazine to 100mg TID  - Drop across 3 CBC cell lines WBC 3.9 Hgb 9.8 Plt 91. Likely dilutional.   - Added Calcitriol given 2dary parathyroidism in setting of CKD and worsening kidney function    12/17:  - Slight worsening of ISIAH, Cr 3.11 to 3.43  - Transplant Nephrology consulted w/ recs as follows:  -Would continue Lasix 40 mg IV twice daily.  Titrate dose based on response.   -Can consider increasing hydralazine to 100 mg 3 times daily if indicated - given high BPs  -Consult Endocrine for hyperparathyroidism  -Check FK trough levels daily.  Last FK trough 7.4, goal 5-8  -Increase Imuran to 50 mg/day   -Prednisone 10 mg/day    Plan:  #Anasarca, scrotal  swelling (improved)  :: Likely driven by increased hydrostatic pressure secondary to renal failure and low albumin causing low oncotic pressure.  :: Not likely heart failure given LV of 60 to 65% on last echo in November, however BNP elevated to 298  :: No suggestive findings of cirrhosis  :: Patient was sent home on furosemide 20mg PO; unclear of medication adherence given use parameters   :: Patient was given IV Lasix 40 mg with about 300 cc urine output. Given spot IV Lasix 80mg for diuresis in past admission w/ success  :: Patient is stable and not dyspneic on room air  - Strict I/Os   - Lasix 40mg IV BID  - Scrotal care   - CTM volume status and RFPs     #ISIAH on CKD3   #Transplant kidney care  :: S/p kidney transplant x2  :: Last FENa 1.3%, plan for repeat assessment  :: Baseline creatinine 2.5-3  :: Patient was discharged with a creatinine of 3.5  :: On admission creatinine 3.1  :: Prelim biopsy done on last admission showing focal proliferative glomerulonephritis  - Transplant Nephrology consulted; recs appreciated  - Started Rituximab 1g IV and next dose in 2 weeks started per Transplant nephrology recs  - Started Ppx SS Bactrim BID for 6 months   - Hydralazine now 100 mg 3 times daily given high BPs  - Continue tacrolimus 2.5mg Check FK trough levels daily.  Last FK trough 7.4, goal 5-8  - Increased azathioprine to 50 mg/day   - Prednisone 10 mg/day  - Continue home cinacalcet 30 mg  - Added calcitriol  - Avoid nephrotoxic agents, NSAIDs  - Avoid nephrotoxic drugs  - Renally dose all meds   - Endocrine consult for hyperparathyroidism on d/charge      #HTN  #HLD  - Increased home Hydralazine 100mg PO TID  - Continue home Amlodipine 10mg PO daily  - Continue home Carvedilol 37.5mg PO BID   - Continue home Losartan 25mg PO daily   - Started Isosorbide Mononitrate 30mg PO daily  - CTM Bps and titrate meds accordingly      #Hx of Back pain  ::MRI 09/19/2023 showing nonspecific focus of abnormal signal in R  pedicle of L4 c/w osseous hemangioma, stress fracture, or osteoid osteoma. No discitis or osteomyelitis. No evidence of metastasis.  - Tylenol 975 mg q8h PRN mild - moderate pain, Tramadol 50mg q12 PRN for severe pain. Oxycodone 5mg PO q6h for BT pain  - Lidocaine patch     #MGUS  #Pancytopenia  :: Bone marrow biopsy done 10/26/2023 showing hypercellular bone marrow for 50% with maturing trilineage hematopoiesis and involved 5% by plasma cell neoplasm, FISH negative for gain of function mutation, hyper triploidy, rearrangement of IgH, deletion of TP53.  :: Skeletal survey on 11/19/2020 negative for osteolytic lesions  :: Likely chronic in nature  :: 12/16 WBC 4 Hgb 10.3 Plt 109  - CTM daily CBCs  - To continue mgmt w/ outpatient hematology     #IDDM  - Insulin glargine 12u qAM  - Moderate SSI    #Hyperparathyroidism   :: Likely 2dary given High PTH and normal Corrected Ca and Phosph  - Start Calcitriol 0.25mcg daily  - Endocrine consult for hyperparathyroidism on d/charge    F: PRN  E: PRN  N; Low Na diet  A: PIV     GI Ppx: None   DVT PPx: SQH     Code Status: Full Code  NOK/MDM Amalia Enriquez (significant other): [phone]      Chavez Goins MD

## 2023-12-20 NOTE — CARE PLAN
The clinical goals for the shift include pt will express adequate pain control by end of shift    Over the shift, the patient did make progress toward the following goals.       Problem: Fall/Injury  Goal: Not fall by end of shift  Outcome: Progressing  Goal: Be free from injury by end of the shift  Outcome: Progressing  Goal: Verbalize understanding of personal risk factors for fall in the hospital  Outcome: Progressing  Goal: Verbalize understanding of risk factor reduction measures to prevent injury from fall in the home  Outcome: Progressing  Goal: Use assistive devices by end of the shift  Outcome: Progressing  Goal: Pace activities to prevent fatigue by end of the shift  Outcome: Progressing

## 2023-12-20 NOTE — PROGRESS NOTES
Manolo Bashir is a 68 y.o. male on day 4 of admission presenting with Fluid overload, unspecified.      Subjective   No acute overnight events       Objective     Vitals 24HR  Heart Rate:  [69-76]   Temp:  [36.1 °C (97 °F)-37.5 °C (99.5 °F)]   Resp:  [17-18]   BP: (160-171)/(64-87)   SpO2:  [100 %]     General appearance: no distress  Eyes: non-icteric  HEENT: atrumatic head, PEERLA, moist mucosa  Skin: no apparent rash  Heart: NSR, S1, S2 normal, no murmur or gallop  Lungs: Basilar crackles  Abdomen: soft, nt/nd  Extremities: 2+ edema  Neuro: GCS 15/15    Intake/Output last 3 Shifts:    Intake/Output Summary (Last 24 hours) at 12/20/2023 1322  Last data filed at 12/20/2023 1018  Gross per 24 hour   Intake 360 ml   Output 500 ml   Net -140 ml       Scheduled medications  amLODIPine, 10 mg, oral, Daily  azaTHIOprine, 50 mg, oral, Daily  calcitriol, 0.25 mcg, oral, Daily  capsicum, , Topical, BID  carvedilol, 37.5 mg, oral, BID  cholecalciferol, 1,000 Units, oral, Daily  cinacalcet, 30 mg, oral, Daily  docusate sodium, 100 mg, oral, Daily  ferrous sulfate (325 mg ferrous sulfate), 65 mg of iron, oral, BID  furosemide, 40 mg, intravenous, BID  heparin (porcine), 5,000 Units, subcutaneous, q8h  hydrALAZINE, 100 mg, oral, TID  insulin glargine, 12 Units, subcutaneous, Nightly  insulin lispro, 0-10 Units, subcutaneous, TID with meals  isosorbide mononitrate ER, 30 mg, oral, Daily  lidocaine, 1 patch, transdermal, Daily  loratadine, 10 mg, oral, Daily  losartan, 25 mg, oral, Daily  polyethylene glycol, 17 g, oral, Daily  pravastatin, 10 mg, oral, Nightly  predniSONE, 10 mg, oral, q AM  tacrolimus, 2.5 mg, oral, q12h ZOË  tacrolimus, , Topical, BID      Continuous medications     PRN medications  PRN medications: acetaminophen, dextrose, diclofenac sodium, glucagon, HYDROmorphone, melatonin, ondansetron ODT, oxyCODONE, traMADol       Relevant Results    Results for orders placed or performed during the hospital encounter  of 12/16/23 (from the past 24 hour(s))   POCT GLUCOSE   Result Value Ref Range    POCT Glucose 286 (H) 74 - 99 mg/dL   POCT GLUCOSE   Result Value Ref Range    POCT Glucose 302 (H) 74 - 99 mg/dL   Urine electrolytes   Result Value Ref Range    Sodium, Urine Random 102 mmol/L    Sodium/Creatinine Ratio 134 Not established. mmol/g Creat    Potassium, Urine Random 34 mmol/L    Potassium/Creatinine Ratio 45 Not established mmol/g Creat    Chloride, Urine Random 121 mmol/L    Chloride/Creatinine Ratio 159 23 - 275 mmol/g creat    Creatinine, Urine Random 76.0 20.0 - 370.0 mg/dL   Protein, Urine Random   Result Value Ref Range    Total Protein, Urine Random 397 (H) 5 - 25 mg/dL    Creatinine, Urine Random 76.0 20.0 - 370.0 mg/dL    T. Protein/Creatinine Ratio 5.22 (H) 0.00 - 0.17 mg/mg Creat   CBC and Auto Differential   Result Value Ref Range    WBC 3.8 (L) 4.4 - 11.3 x10*3/uL    nRBC 0.0 0.0 - 0.0 /100 WBCs    RBC 3.48 (L) 4.50 - 5.90 x10*6/uL    Hemoglobin 10.1 (L) 13.5 - 17.5 g/dL    Hematocrit 30.2 (L) 41.0 - 52.0 %    MCV 87 80 - 100 fL    MCH 29.0 26.0 - 34.0 pg    MCHC 33.4 32.0 - 36.0 g/dL    RDW 14.2 11.5 - 14.5 %    Platelets 113 (L) 150 - 450 x10*3/uL    Neutrophils % 61.3 40.0 - 80.0 %    Immature Granulocytes %, Automated 1.1 (H) 0.0 - 0.9 %    Lymphocytes % 23.7 13.0 - 44.0 %    Monocytes % 10.7 2.0 - 10.0 %    Eosinophils % 2.7 0.0 - 6.0 %    Basophils % 0.5 0.0 - 2.0 %    Neutrophils Absolute 2.30 1.20 - 7.70 x10*3/uL    Immature Granulocytes Absolute, Automated 0.04 0.00 - 0.70 x10*3/uL    Lymphocytes Absolute 0.89 (L) 1.20 - 4.80 x10*3/uL    Monocytes Absolute 0.40 0.10 - 1.00 x10*3/uL    Eosinophils Absolute 0.10 0.00 - 0.70 x10*3/uL    Basophils Absolute 0.02 0.00 - 0.10 x10*3/uL   Comprehensive metabolic panel   Result Value Ref Range    Glucose 162 (H) 74 - 99 mg/dL    Sodium 139 136 - 145 mmol/L    Potassium 4.3 3.5 - 5.3 mmol/L    Chloride 109 (H) 98 - 107 mmol/L    Bicarbonate 27 21 - 32 mmol/L     Anion Gap 7 (L) 10 - 20 mmol/L    Urea Nitrogen 44 (H) 6 - 23 mg/dL    Creatinine 3.59 (H) 0.50 - 1.30 mg/dL    eGFR 18 (L) >60 mL/min/1.73m*2    Calcium 8.3 (L) 8.6 - 10.6 mg/dL    Albumin 2.4 (L) 3.4 - 5.0 g/dL    Alkaline Phosphatase 58 33 - 136 U/L    Total Protein 4.8 (L) 6.4 - 8.2 g/dL    AST 12 9 - 39 U/L    Bilirubin, Total 0.2 0.0 - 1.2 mg/dL    ALT 10 10 - 52 U/L   Magnesium   Result Value Ref Range    Magnesium 1.77 1.60 - 2.40 mg/dL   Tacrolimus level   Result Value Ref Range    Tacrolimus  6.7 <=15.0 ng/mL   POCT GLUCOSE   Result Value Ref Range    POCT Glucose 144 (H) 74 - 99 mg/dL   POCT GLUCOSE   Result Value Ref Range    POCT Glucose 191 (H) 74 - 99 mg/dL        Assessment/Plan     Mr Bashir is a 69yo M with PMH ESRD (on HD 3132-4509), s/p 2x renal transplants (1992, 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     #Allograft function  -Renal function stable,  Cr 3.6 today from 3.5 yesterday   -Urine output- 500cc charted 12/19      -Renal biopsy (Nov 2023):  no evidence of active ACR or ABMR, did show focal proliferative GN with 1 cellular crescent, . IF showed 1+ staining for IgM and C3.  Consistent with immune complex glomerulonephritis. also noted was severe arteriolar hyalinosis and arteriosclerosis.     -UA: 2+ Protein  -Urine TPCR (12/20): 5.22      Suspect that GN is due to MGUS  Hep C antibody positive, patient says he completed treatment in the past   Planning for treatment with Rituximab     Recommendations:  -Ok to proceed with Rituximab 1g IV today. Premedicate ~30 minutes prior to administration with acetaminophen, antihistamine, and methylprednisolone 100  mg IV (or equivalent)   -Bactrim single strength 1 tab daily for 6 months   -Lasix 40mg IV bid   -Ok to be discharged after infusion from renal POV, will inform team about scheduling of 2nd dose Rituximab in 15 days  -Follow up transplant nephrology outpatient     #Immunosuppression  -Azathioprine 50mg po once daily  -Prednisone 10mg po once daily   -Tacrolimus 2.5mg po q12h   -Check Tacrolimus trough daily. Goal Tac trough 5-8     #HTN  -Amlodipine 10mg po once daily  -Carvedilol 37.5mg po bid   -Hydralazine 100mg po tid   -Losartan 25mg po once daily     #Acid base  -Stable, HCO3 27    #Electrolytes  -K 4.3    #CKD-MBD  -Calcitriol 0.25mcg po daily  -Vit D3 1000 units po daily  -Cinacalcet 30mg po once daily    #CKD- Anemia  -Continue oral iron  -HGB 10.1     SW attending Dr Dina Kidd MD  Nephrology fellow PGY4   Transplant nephrology pager [phone]

## 2023-12-21 ENCOUNTER — APPOINTMENT (OUTPATIENT)
Dept: PRIMARY CARE | Facility: CLINIC | Age: 68
End: 2023-12-21
Payer: COMMERCIAL

## 2023-12-21 ENCOUNTER — TELEPHONE (OUTPATIENT)
Dept: TRANSPLANT | Facility: HOSPITAL | Age: 68
End: 2023-12-21

## 2023-12-21 ENCOUNTER — PHARMACY VISIT (OUTPATIENT)
Dept: PHARMACY | Facility: CLINIC | Age: 68
End: 2023-12-21
Payer: MEDICAID

## 2023-12-21 ENCOUNTER — APPOINTMENT (OUTPATIENT)
Dept: CARDIOLOGY | Facility: HOSPITAL | Age: 68
End: 2023-12-21
Payer: COMMERCIAL

## 2023-12-21 VITALS
TEMPERATURE: 97.2 F | RESPIRATION RATE: 18 BRPM | HEART RATE: 74 BPM | OXYGEN SATURATION: 99 % | BODY MASS INDEX: 30.58 KG/M2 | HEIGHT: 68 IN | WEIGHT: 201.8 LBS | DIASTOLIC BLOOD PRESSURE: 76 MMHG | SYSTOLIC BLOOD PRESSURE: 157 MMHG

## 2023-12-21 DIAGNOSIS — Z94.0 KIDNEY REPLACED BY TRANSPLANT (HHS-HCC): ICD-10-CM

## 2023-12-21 LAB
ALBUMIN SERPL BCP-MCNC: 2.6 G/DL (ref 3.4–5)
ALP SERPL-CCNC: 61 U/L (ref 33–136)
ALT SERPL W P-5'-P-CCNC: 12 U/L (ref 10–52)
ANION GAP SERPL CALC-SCNC: 12 MMOL/L (ref 10–20)
AST SERPL W P-5'-P-CCNC: 12 U/L (ref 9–39)
BASOPHILS # BLD AUTO: 0 X10*3/UL (ref 0–0.1)
BASOPHILS NFR BLD AUTO: 0 %
BILIRUB SERPL-MCNC: 0.2 MG/DL (ref 0–1.2)
BUN SERPL-MCNC: 49 MG/DL (ref 6–23)
CALCIUM SERPL-MCNC: 8.8 MG/DL (ref 8.6–10.6)
CHLORIDE SERPL-SCNC: 106 MMOL/L (ref 98–107)
CO2 SERPL-SCNC: 24 MMOL/L (ref 21–32)
CREAT SERPL-MCNC: 3.8 MG/DL (ref 0.5–1.3)
EOSINOPHIL # BLD AUTO: 0 X10*3/UL (ref 0–0.7)
EOSINOPHIL NFR BLD AUTO: 0 %
ERYTHROCYTE [DISTWIDTH] IN BLOOD BY AUTOMATED COUNT: 14.2 % (ref 11.5–14.5)
GFR SERPL CREATININE-BSD FRML MDRD: 17 ML/MIN/1.73M*2
GLUCOSE BLD MANUAL STRIP-MCNC: 245 MG/DL (ref 74–99)
GLUCOSE BLD MANUAL STRIP-MCNC: 369 MG/DL (ref 74–99)
GLUCOSE SERPL-MCNC: 304 MG/DL (ref 74–99)
HCT VFR BLD AUTO: 32.4 % (ref 41–52)
HGB BLD-MCNC: 11 G/DL (ref 13.5–17.5)
IMM GRANULOCYTES # BLD AUTO: 0.03 X10*3/UL (ref 0–0.7)
IMM GRANULOCYTES NFR BLD AUTO: 0.7 % (ref 0–0.9)
LYMPHOCYTES # BLD AUTO: 0.27 X10*3/UL (ref 1.2–4.8)
LYMPHOCYTES NFR BLD AUTO: 6.3 %
MAGNESIUM SERPL-MCNC: 1.82 MG/DL (ref 1.6–2.4)
MCH RBC QN AUTO: 29.4 PG (ref 26–34)
MCHC RBC AUTO-ENTMCNC: 34 G/DL (ref 32–36)
MCV RBC AUTO: 87 FL (ref 80–100)
MONOCYTES # BLD AUTO: 0.03 X10*3/UL (ref 0.1–1)
MONOCYTES NFR BLD AUTO: 0.7 %
NEUTROPHILS # BLD AUTO: 3.99 X10*3/UL (ref 1.2–7.7)
NEUTROPHILS NFR BLD AUTO: 92.3 %
NRBC BLD-RTO: 0 /100 WBCS (ref 0–0)
PLATELET # BLD AUTO: 116 X10*3/UL (ref 150–450)
POTASSIUM SERPL-SCNC: 5.1 MMOL/L (ref 3.5–5.3)
PROT SERPL-MCNC: 5.3 G/DL (ref 6.4–8.2)
RBC # BLD AUTO: 3.74 X10*6/UL (ref 4.5–5.9)
SODIUM SERPL-SCNC: 137 MMOL/L (ref 136–145)
TACROLIMUS BLD-MCNC: 6.9 NG/ML
WBC # BLD AUTO: 4.3 X10*3/UL (ref 4.4–11.3)

## 2023-12-21 PROCEDURE — 93005 ELECTROCARDIOGRAM TRACING: CPT

## 2023-12-21 PROCEDURE — 80053 COMPREHEN METABOLIC PANEL: CPT

## 2023-12-21 PROCEDURE — 80197 ASSAY OF TACROLIMUS: CPT

## 2023-12-21 PROCEDURE — RXMED WILLOW AMBULATORY MEDICATION CHARGE

## 2023-12-21 PROCEDURE — 2500000001 HC RX 250 WO HCPCS SELF ADMINISTERED DRUGS (ALT 637 FOR MEDICARE OP)

## 2023-12-21 PROCEDURE — 2500000002 HC RX 250 W HCPCS SELF ADMINISTERED DRUGS (ALT 637 FOR MEDICARE OP, ALT 636 FOR OP/ED)

## 2023-12-21 PROCEDURE — 85025 COMPLETE CBC W/AUTO DIFF WBC: CPT

## 2023-12-21 PROCEDURE — 96372 THER/PROPH/DIAG INJ SC/IM: CPT

## 2023-12-21 PROCEDURE — 82947 ASSAY GLUCOSE BLOOD QUANT: CPT

## 2023-12-21 PROCEDURE — 36415 COLL VENOUS BLD VENIPUNCTURE: CPT

## 2023-12-21 PROCEDURE — 2500000004 HC RX 250 GENERAL PHARMACY W/ HCPCS (ALT 636 FOR OP/ED)

## 2023-12-21 PROCEDURE — 99239 HOSP IP/OBS DSCHRG MGMT >30: CPT

## 2023-12-21 PROCEDURE — 2500000005 HC RX 250 GENERAL PHARMACY W/O HCPCS

## 2023-12-21 PROCEDURE — 83735 ASSAY OF MAGNESIUM: CPT

## 2023-12-21 RX ORDER — FUROSEMIDE 40 MG/1
40 TABLET ORAL 2 TIMES DAILY
Status: DISCONTINUED | OUTPATIENT
Start: 2023-12-21 | End: 2023-12-21 | Stop reason: HOSPADM

## 2023-12-21 RX ORDER — CARVEDILOL 12.5 MG/1
37.5 TABLET ORAL 2 TIMES DAILY
Qty: 180 TABLET | Refills: 0 | Status: SHIPPED | OUTPATIENT
Start: 2023-12-21 | End: 2024-01-08 | Stop reason: SDUPTHER

## 2023-12-21 RX ORDER — AZATHIOPRINE 50 MG/1
50 TABLET ORAL DAILY
Qty: 30 TABLET | Refills: 2 | Status: SHIPPED | OUTPATIENT
Start: 2023-12-22 | End: 2024-02-20 | Stop reason: SDUPTHER

## 2023-12-21 RX ORDER — ACETAMINOPHEN 325 MG/1
975 TABLET ORAL EVERY 6 HOURS PRN
Qty: 30 TABLET | Refills: 2 | Status: CANCELLED | OUTPATIENT
Start: 2023-12-21 | End: 2024-01-20

## 2023-12-21 RX ORDER — POLYETHYLENE GLYCOL 3350 17 G/17G
17 POWDER, FOR SOLUTION ORAL DAILY
Qty: 30 PACKET | Refills: 0 | Status: SHIPPED | OUTPATIENT
Start: 2023-12-22 | End: 2024-01-16 | Stop reason: ENTERED-IN-ERROR

## 2023-12-21 RX ORDER — CINACALCET 30 MG/1
30 TABLET, FILM COATED ORAL DAILY
Qty: 30 TABLET | Refills: 2 | Status: CANCELLED | OUTPATIENT
Start: 2023-12-22 | End: 2024-03-21

## 2023-12-21 RX ORDER — CINACALCET 30 MG/1
30 TABLET, FILM COATED ORAL DAILY
Qty: 30 TABLET | Refills: 2 | Status: SHIPPED | OUTPATIENT
Start: 2023-12-22 | End: 2024-01-21 | Stop reason: HOSPADM

## 2023-12-21 RX ORDER — CHOLECALCIFEROL (VITAMIN D3) 25 MCG
1000 TABLET ORAL DAILY
Qty: 30 TABLET | Refills: 2 | Status: CANCELLED | OUTPATIENT
Start: 2023-12-22 | End: 2024-03-21

## 2023-12-21 RX ORDER — ISOSORBIDE MONONITRATE 60 MG/1
60 TABLET, EXTENDED RELEASE ORAL DAILY
Status: DISCONTINUED | OUTPATIENT
Start: 2023-12-22 | End: 2023-12-21 | Stop reason: HOSPADM

## 2023-12-21 RX ORDER — SULFAMETHOXAZOLE AND TRIMETHOPRIM 400; 80 MG/1; MG/1
1 TABLET ORAL 2 TIMES DAILY
Qty: 60 TABLET | Refills: 5 | Status: SHIPPED | OUTPATIENT
Start: 2023-12-21 | End: 2024-05-11 | Stop reason: ALTCHOICE

## 2023-12-21 RX ORDER — GABAPENTIN 300 MG/1
300 CAPSULE ORAL 2 TIMES DAILY
Qty: 60 CAPSULE | Refills: 1 | Status: CANCELLED | OUTPATIENT
Start: 2023-12-21 | End: 2024-02-19

## 2023-12-21 RX ORDER — HYDRALAZINE HYDROCHLORIDE 100 MG/1
100 TABLET, FILM COATED ORAL 3 TIMES DAILY
Qty: 90 TABLET | Refills: 2 | Status: ON HOLD | OUTPATIENT
Start: 2023-12-21 | End: 2024-03-15 | Stop reason: SDUPTHER

## 2023-12-21 RX ORDER — AZATHIOPRINE 50 MG/1
50 TABLET ORAL DAILY
Qty: 30 TABLET | Refills: 2 | Status: CANCELLED | OUTPATIENT
Start: 2023-12-22 | End: 2024-03-21

## 2023-12-21 RX ORDER — CALCITRIOL 0.25 UG/1
0.25 CAPSULE ORAL DAILY
Qty: 30 CAPSULE | Refills: 2 | Status: ON HOLD | OUTPATIENT
Start: 2023-12-22 | End: 2024-03-30 | Stop reason: SDUPTHER

## 2023-12-21 RX ORDER — CLOTRIMAZOLE 10 MG/1
10 LOZENGE ORAL; TOPICAL 3 TIMES DAILY
Status: DISCONTINUED | OUTPATIENT
Start: 2023-12-21 | End: 2023-12-21 | Stop reason: HOSPADM

## 2023-12-21 RX ORDER — ISOSORBIDE MONONITRATE 60 MG/1
60 TABLET, EXTENDED RELEASE ORAL DAILY
Qty: 30 TABLET | Refills: 2 | Status: SHIPPED | OUTPATIENT
Start: 2023-12-22 | End: 2024-01-21 | Stop reason: HOSPADM

## 2023-12-21 RX ORDER — CARVEDILOL 12.5 MG/1
37.5 TABLET ORAL 2 TIMES DAILY
Qty: 180 TABLET | Refills: 2 | Status: CANCELLED | OUTPATIENT
Start: 2023-12-21 | End: 2024-03-20

## 2023-12-21 RX ORDER — ISOSORBIDE MONONITRATE 60 MG/1
60 TABLET, EXTENDED RELEASE ORAL DAILY
Qty: 30 TABLET | Refills: 2 | Status: CANCELLED | OUTPATIENT
Start: 2023-12-22 | End: 2024-03-21

## 2023-12-21 RX ORDER — CLOTRIMAZOLE 10 MG/1
10 LOZENGE ORAL; TOPICAL 3 TIMES DAILY
Qty: 90 TABLET | Refills: 0 | Status: SHIPPED | OUTPATIENT
Start: 2023-12-21 | End: 2024-01-21 | Stop reason: HOSPADM

## 2023-12-21 RX ORDER — DOCUSATE SODIUM 100 MG/1
100 CAPSULE, LIQUID FILLED ORAL DAILY
Qty: 30 CAPSULE | Refills: 2 | Status: SHIPPED | OUTPATIENT
Start: 2023-12-22 | End: 2024-01-16 | Stop reason: ENTERED-IN-ERROR

## 2023-12-21 RX ORDER — SULFAMETHOXAZOLE AND TRIMETHOPRIM 400; 80 MG/1; MG/1
80 TABLET ORAL 2 TIMES DAILY
Status: DISCONTINUED | OUTPATIENT
Start: 2023-12-21 | End: 2023-12-21 | Stop reason: HOSPADM

## 2023-12-21 RX ORDER — LIDOCAINE 560 MG/1
1 PATCH PERCUTANEOUS; TOPICAL; TRANSDERMAL DAILY
Qty: 30 PATCH | Refills: 2 | Status: CANCELLED | OUTPATIENT
Start: 2023-12-22 | End: 2024-01-21

## 2023-12-21 RX ORDER — FUROSEMIDE 40 MG/1
40 TABLET ORAL 2 TIMES DAILY
Qty: 60 TABLET | Refills: 2 | Status: SHIPPED | OUTPATIENT
Start: 2023-12-21 | End: 2024-01-21 | Stop reason: HOSPADM

## 2023-12-21 RX ORDER — DOCUSATE SODIUM 100 MG/1
100 CAPSULE, LIQUID FILLED ORAL DAILY
Qty: 30 CAPSULE | Refills: 2 | Status: CANCELLED | OUTPATIENT
Start: 2023-12-22 | End: 2024-03-21

## 2023-12-21 RX ORDER — HYDRALAZINE HYDROCHLORIDE 100 MG/1
100 TABLET, FILM COATED ORAL 3 TIMES DAILY
Qty: 90 TABLET | Refills: 2 | Status: CANCELLED | OUTPATIENT
Start: 2023-12-21 | End: 2024-03-20

## 2023-12-21 RX ORDER — CALCITRIOL 0.25 UG/1
0.25 CAPSULE ORAL DAILY
Qty: 30 CAPSULE | Refills: 2 | Status: CANCELLED | OUTPATIENT
Start: 2023-12-22 | End: 2024-03-21

## 2023-12-21 RX ORDER — CHOLECALCIFEROL (VITAMIN D3) 25 MCG
1000 TABLET ORAL DAILY
Qty: 30 TABLET | Refills: 2 | Status: SHIPPED | OUTPATIENT
Start: 2023-12-22 | End: 2024-04-04

## 2023-12-21 RX ORDER — SULFAMETHOXAZOLE AND TRIMETHOPRIM 400; 80 MG/1; MG/1
1 TABLET ORAL 2 TIMES DAILY
Qty: 60 TABLET | Refills: 5 | Status: CANCELLED | OUTPATIENT
Start: 2023-12-21 | End: 2024-06-18

## 2023-12-21 RX ADMIN — CINACALCET 30 MG: 30 TABLET, FILM COATED ORAL at 08:55

## 2023-12-21 RX ADMIN — FERROUS SULFATE TAB 325 MG (65 MG ELEMENTAL FE) 1 TABLET: 325 (65 FE) TAB at 08:55

## 2023-12-21 RX ADMIN — LOSARTAN POTASSIUM 25 MG: 25 TABLET, FILM COATED ORAL at 08:56

## 2023-12-21 RX ADMIN — PREDNISONE 10 MG: 5 TABLET ORAL at 08:55

## 2023-12-21 RX ADMIN — LIDOCAINE 1 PATCH: 4 PATCH TOPICAL at 08:56

## 2023-12-21 RX ADMIN — AZATHIOPRINE 50 MG: 50 TABLET ORAL at 08:56

## 2023-12-21 RX ADMIN — HYDRALAZINE HYDROCHLORIDE 100 MG: 25 TABLET, FILM COATED ORAL at 14:48

## 2023-12-21 RX ADMIN — ISOSORBIDE MONONITRATE 30 MG: 30 TABLET, EXTENDED RELEASE ORAL at 08:56

## 2023-12-21 RX ADMIN — LORATADINE 10 MG: 10 TABLET ORAL at 08:55

## 2023-12-21 RX ADMIN — CARVEDILOL 37.5 MG: 25 TABLET, FILM COATED ORAL at 08:56

## 2023-12-21 RX ADMIN — CLOTRIMAZOLE 10 MG: 10 LOZENGE ORAL; TOPICAL at 15:00

## 2023-12-21 RX ADMIN — FUROSEMIDE 40 MG: 10 INJECTION, SOLUTION INTRAVENOUS at 08:55

## 2023-12-21 RX ADMIN — CAPSAICIN: 0.75 CREAM TOPICAL at 08:57

## 2023-12-21 RX ADMIN — AMLODIPINE BESYLATE 10 MG: 10 TABLET ORAL at 08:55

## 2023-12-21 RX ADMIN — TACROLIMUS: 1 OINTMENT TOPICAL at 08:57

## 2023-12-21 RX ADMIN — INSULIN LISPRO 10 UNITS: 100 INJECTION, SOLUTION INTRAVENOUS; SUBCUTANEOUS at 13:09

## 2023-12-21 RX ADMIN — SULFAMETHOXAZOLE AND TRIMETHOPRIM 80 MG OF TRIMETHOPRIM: 200; 40 SUSPENSION ORAL at 08:55

## 2023-12-21 RX ADMIN — TACROLIMUS 2.5 MG: 0.5 CAPSULE ORAL at 06:20

## 2023-12-21 RX ADMIN — INSULIN LISPRO 4 UNITS: 100 INJECTION, SOLUTION INTRAVENOUS; SUBCUTANEOUS at 08:58

## 2023-12-21 RX ADMIN — HEPARIN SODIUM 5000 UNITS: 5000 INJECTION INTRAVENOUS; SUBCUTANEOUS at 08:55

## 2023-12-21 RX ADMIN — Medication 1000 UNITS: at 08:56

## 2023-12-21 RX ADMIN — ACETAMINOPHEN 975 MG: 325 TABLET ORAL at 08:55

## 2023-12-21 RX ADMIN — DOCUSATE SODIUM 100 MG: 100 CAPSULE, LIQUID FILLED ORAL at 08:56

## 2023-12-21 RX ADMIN — CALCITRIOL CAPSULES 0.25 MCG 0.25 MCG: 0.25 CAPSULE ORAL at 08:56

## 2023-12-21 RX ADMIN — HYDRALAZINE HYDROCHLORIDE 100 MG: 25 TABLET, FILM COATED ORAL at 08:55

## 2023-12-21 ASSESSMENT — COGNITIVE AND FUNCTIONAL STATUS - GENERAL
MOBILITY SCORE: 24
DAILY ACTIVITIY SCORE: 24

## 2023-12-21 ASSESSMENT — PAIN SCALES - GENERAL
PAINLEVEL_OUTOF10: 3
PAINLEVEL_OUTOF10: 9

## 2023-12-21 ASSESSMENT — PAIN DESCRIPTION - LOCATION: LOCATION: BACK

## 2023-12-21 ASSESSMENT — PAIN DESCRIPTION - ORIENTATION: ORIENTATION: RIGHT

## 2023-12-21 ASSESSMENT — PAIN - FUNCTIONAL ASSESSMENT
PAIN_FUNCTIONAL_ASSESSMENT: 0-10
PAIN_FUNCTIONAL_ASSESSMENT: 0-10

## 2023-12-21 ASSESSMENT — PAIN DESCRIPTION - DESCRIPTORS: DESCRIPTORS: ACHING;DISCOMFORT

## 2023-12-21 NOTE — CARE PLAN
The patient's goals for the shift include      The clinical goals for the shift include Patients pain will remain controlled during shift    Patient's pain remained controlled during shift.  Patient is awaiting rutuximab this evening.

## 2023-12-21 NOTE — DISCHARGE INSTRUCTIONS
Dear Mr. Bashir,    You presented to [facility] on 12/16/2023 generalized increased fluid retention related to the function of your transplanted kidney. Whilst on admission we engaged our Transplant Nephrology team to help with the needed diuresis and to guide additional therapy for the kidney. You were started on Rituximab that would be continued to be given in the outpatient setting. You were also started on Bactrim that would needed to be taken twice daily for 6 months. We also started Clotrimazole that needs to be taken for 1 month as instructed. We were able to remove a lot of the water weight that you accumulated and you confirmed clinical improvement. We will be sending you with oral Lasix and would require that you take one pill in the morning and the other in the evening. You will need to continuously weigh yourself and should take an increased dose of 1 tablet with a gain of 2-3 Ibs over 2 days.     During your stay you remained hemodynamically stable and did not require any other major interventions. We worked with our pharmacists to ensure you were comfortable and your medications were optimized given your comorbidities.     Please follow up with the Transplant nephrology team in 3 weeks for clinic follow up. You will needs another infusion of the newly prescribed Rituximab and to assess for interval changes in 2 weeks and you will be contacted about this. Also plan to follow up with Endocrinology for your hyperparathyroidism as well as your PCP given your recent admission.     Thanks for choosing .    Medicine Team

## 2023-12-21 NOTE — PROGRESS NOTES
Transitional Care Coordination Progress Note:  PLAN: Waiting on nephro clearance    PAYOR: Pending sale to [facility]    DISPO: Home no needs    SUPPORT/CONTACT: Amalia, [phone]    Daxa Savage RN, TCC

## 2023-12-21 NOTE — NURSING NOTE
Geriatric Nursing Rounds Summary  Mr Bashir is a 68 year old full code  Admitted Glomerulonephritis h/o kidney transplants  Home today with family, cam neg  Tramadol for hip pain

## 2023-12-22 NOTE — DISCHARGE SUMMARY
Discharge Diagnosis  Fluid overload, unspecified    Issues Requiring Follow-Up  Transplant Nephrology  Endocrinology    Test Results Pending At Discharge  Pending Labs       No current pending labs.            Hospital Course  Mr. Bashir is an 69 y/o M w/ a PMH of ESRD (formerly on HD [id number]), CKD 3 (s/p 2x renal transplants (1992, 2013, currently on prednisone, tacrolimus, azathioprine, last baseline Cr 2.5-2.8), pancytopenia, angina (last EF 60-65% 2/201), IDDM2 (last A1c 6.0 % 10/26), HTN, prostate cancer s/p radical prostatectomy, HCV (treated and RNA not detected last 10/18/23) that presents w/  increased generalized swelling, especially in his b/l LE, scrotum, ~20Ibs weight gain and poor urine output. He stated that this has had gradual buildup of fluids since his last admission and more recently has had collection to his scrotum causing significant discomfort. He notes abdominal swelling as well which is non-tender. He was admitted for diureses and management of transplanted kidney.     On admission Transplant nephrology were consulted and provided a diureses plan whilst inpatient. They followed the report from the transplant kidney biopsy and decided the need to start immuno suppression. This was reviewed by the oncology and were in agreement. He was started on Rituximab and received the first dose whilst here and was also started on Bacterial and Fungal prophylaxis medications. He diuresed adequately during his stay and had a significant decrease in his general body fluid retention. He remained hemodynamically stable whilst here and the decision was for him to use an increased dose of oral lasix at home with a follow up with the Transplant Nephrology team in 2 weeks. Calcitriol was added to his meds pending follow up with Endocrinology for his hyperparathyroidism.    Pertinent Physical Exam At Time of Discharge  General: Awake, alert, conversant, appears stated age  HEENT: Pupils equal and round, no  scleral icterus or conjunctivitis  Skin: Anasarca, raised spots over face, L UE arm bumps of old fistula  Chest: Ctab, normal respiratory effort, not on supplemental oxygen  Cardiac: Regular rate and rhythm, normal s1, s2, no M/R/G, no JVD  Abdomen: Enlarged, moderately tight, NT, no involuntary guarding  : No flank pain or indwelling urinary catheter  EXT: General edema to b/l U and L extremities w/ R>L, now less than previously seen   MSK: No focal joint swelling noted  Neuro: AOx4, moving all limbs spontaneously, follows commands  Psych: Coherent thought process, appropriate mood and affect    Home Medications     Medication List      START taking these medications     calcitriol 0.25 mcg capsule; Commonly known as: Rocaltrol; Take 1   capsule (0.25 mcg) by mouth once daily. Do not start before December 22, 2023.   cholecalciferol 25 MCG (1000 UT) tablet; Commonly known as: Vitamin D-3;   Take 1 tablet (1,000 Units) by mouth once daily. Do not start before   December 22, 2023.; Replaces: cholecalciferol 25 MCG (1000 UT) capsule   clotrimazole 10 mg dariel; Commonly known as: Mycelex; Take 1 tablet (10   mg) by mouth 3 times a day.   isosorbide mononitrate ER 60 mg 24 hr tablet; Commonly known as: Imdur;   Take 1 tablet (60 mg) by mouth once daily. Do not crush or chew. Do not   start before December 22, 2023.   polyethylene glycol 17 gram packet; Commonly known as: Glycolax,   Miralax; Take 17 g by mouth once daily. Do not start before December 22, 2023.   sulfamethoxazole-trimethoprim 400-80 mg tablet; Commonly known as:   Bactrim; Take 1 tablet by mouth 2 times a day.     CHANGE how you take these medications     azaTHIOprine 50 mg tablet; Commonly known as: Imuran; Take 1 tablet (50   mg) by mouth once daily. Do not start before December 22, 2023.; What   changed: how much to take   docusate sodium 100 mg capsule; Commonly known as: Colace; Take 1   capsule (100 mg) by mouth once daily. Do not start  "before December 22, 2023.; What changed: how much to take, how to take this, when to take this   furosemide 40 mg tablet; Commonly known as: Lasix; Take 1 tablet (40 mg)   by mouth 2 times a day.; What changed: medication strength, how much to   take, when to take this, reasons to take this   hydrALAZINE 100 mg tablet; Commonly known as: Apresoline; Take 1 tablet   (100 mg) by mouth 3 times a day.; What changed: medication strength, how   much to take     CONTINUE taking these medications     amLODIPine 10 mg tablet; Commonly known as: Norvasc   carvedilol 12.5 mg tablet; Commonly known as: Coreg; Take 3 tablets   (37.5 mg) by mouth 2 times a day.   cinacalcet 30 mg tablet; Commonly known as: Sensipar; Take 1 tablet (30   mg) by mouth once daily. Take with food or shortly afer a meal. Swallow   tablet whole; do not break or divide. Do not start before December 22, 2023.   FeroSuL tablet; Generic drug: ferrous sulfate (325 mg ferrous sulfate);   Take 1 tablet by mouth 2 times a day.   losartan 25 mg tablet; Commonly known as: Cozaar   ondansetron ODT 4 mg disintegrating tablet; Commonly known as:   Zofran-ODT   * TechLITE Pen Needle 32 gauge x 5/32\" needle; Generic drug: pen needle,   diabetic   * pen needle, diabetic 32 gauge x 5/32\" needle; Commonly known as:   TechLITE Pen Needle; 1 each 3 times a day.   pravastatin 10 mg tablet; Commonly known as: Pravachol; Take 1 tablet   (10 mg) by mouth once daily at bedtime.   predniSONE 5 mg tablet; Commonly known as: Deltasone; Take 2 tablets (10   mg) by mouth once daily in the morning.   * tacrolimus 0.1 % ointment; Commonly known as: Protopic; Apply   topically 2 times a day.   * Prograf 0.5 mg capsule; Take 2.5 mg by mouth every 12 hours.   * Unilet Super Thin Lancets 30 gauge misc; Generic drug: lancets; USE TO   TEST BLOOD SUGAR THREE TIMES A DAY   * Unilet Super Thin Lancets 30 gauge misc; Generic drug: lancets; 1 each   3 times a day.  * This list has 6 " medication(s) that are the same as other medications   prescribed for you. Read the directions carefully, and ask your doctor or   other care provider to review them with you.     STOP taking these medications     cholecalciferol 25 MCG (1000 UT) capsule; Commonly known as: Vitamin   D-3; Replaced by: cholecalciferol 25 MCG (1000 UT) tablet   diclofenac sodium 1 % gel gel; Commonly known as: Voltaren   gabapentin 300 mg capsule; Commonly known as: Neurontin   insulin glargine 100 unit/mL injection; Commonly known as: Lantus   insulin lispro 100 unit/mL injection; Commonly known as: HumaLOG   loratadine 10 mg tablet; Commonly known as: Claritin   OneTouch Ultra Test strip; Generic drug: blood sugar diagnostic       Outpatient Follow-Up  Future Appointments   Date Time Provider Department [place]   1/9/2024  2:00 PM Sumeet Bacon MD [id number] [facility]   1/18/2024  2:45 PM Kurtis Jc MD [id number] [facility]   2/28/2024 10:00 AM Estella Quinonez MD [id number] [facility]   3/27/2024 10:30 AM Elías Penn APRN-CNP [id number] [facility]   4/16/2024 10:20 AM TXP KIDNEY PROVIDER [id number] [facility]   5/6/2024  9:15 AM Daxa Cote DPM [id number] [facility]   10/16/2024 10:30 AM Vinicius Araiza MD [id number] [facility]       Chavez Goins MD

## 2023-12-22 NOTE — HOSPITAL COURSE
Mr. Bashir is an 67 y/o M w/ a PMH of ESRD (formerly on HD [id number]), CKD 3 (s/p 2x renal transplants (1992, 2013, currently on prednisone, tacrolimus, azathioprine, last baseline Cr 2.5-2.8), pancytopenia, angina (last EF 60-65% 2/201), IDDM2 (last A1c 6.0 % 10/26), HTN, prostate cancer s/p radical prostatectomy, HCV (treated and RNA not detected last 10/18/23) that presents w/  increased generalized swelling, especially in his b/l LE, scrotum, ~20Ibs weight gain and poor urine output. He stated that this has had gradual buildup of fluids since his last admission and more recently has had collection to his scrotum causing significant discomfort. He notes abdominal swelling as well which is non-tender. He was admitted for diureses and management of transplanted kidney.     On admission Transplant nephrology were consulted and provided a diureses plan whilst inpatient. They followed the report from the transplant kidney biopsy and decided the need to start immuno suppression. This was reviewed by the oncology and were in agreement. He was started on Rituximab and received the first dose whilst here and was also started on Bacterial and Fungal prophylaxis medications. He diuresed adequately during his stay and had a significant decrease in his general body fluid retention. He remained hemodynamically stable whilst here and the decision was for him to use an increased dose of oral lasix at home with a follow up with the Transplant Nephrology team in 2 weeks. Calcitriol was added to his meds pending follow up with Endocrinology for his hyperparathyroidism.

## 2023-12-23 PROBLEM — N05.1: Status: ACTIVE | Noted: 2023-12-23

## 2023-12-26 DIAGNOSIS — Z94.0 KIDNEY REPLACED BY TRANSPLANT (HHS-HCC): ICD-10-CM

## 2023-12-26 PROBLEM — Z79.899: Status: ACTIVE | Noted: 2023-12-26

## 2024-01-03 LAB
ATRIAL RATE: 64 BPM
P AXIS: 69 DEGREES
P OFFSET: 163 MS
P ONSET: 131 MS
PR INTERVAL: 170 MS
Q ONSET: 216 MS
QRS COUNT: 11 BEATS
QRS DURATION: 134 MS
QT INTERVAL: 400 MS
QTC CALCULATION(BAZETT): 412 MS
QTC FREDERICIA: 408 MS
R AXIS: -29 DEGREES
T AXIS: 43 DEGREES
T OFFSET: 416 MS
VENTRICULAR RATE: 64 BPM

## 2024-01-04 NOTE — TELEPHONE ENCOUNTER
Called patient advised we received notification that patient needed a pre-auth for the infusion.  Reached out to pre-cert to find out status of his authorization for his infusion.

## 2024-01-05 ENCOUNTER — HOSPITAL ENCOUNTER (OUTPATIENT)
Facility: HOSPITAL | Age: 69
Setting detail: OBSERVATION
LOS: 1 days | Discharge: HOME | End: 2024-01-06
Attending: INTERNAL MEDICINE | Admitting: INTERNAL MEDICINE
Payer: COMMERCIAL

## 2024-01-05 DIAGNOSIS — Z94.0 KIDNEY TRANSPLANTED (HHS-HCC): Primary | ICD-10-CM

## 2024-01-05 DIAGNOSIS — Z79.899 IMMUNOSUPPRESSIVE MANAGEMENT ENCOUNTER FOLLOWING KIDNEY TRANSPLANT (HHS-HCC): ICD-10-CM

## 2024-01-05 DIAGNOSIS — Z94.0 KIDNEY REPLACED BY TRANSPLANT (HHS-HCC): ICD-10-CM

## 2024-01-05 DIAGNOSIS — N05.1 FOCAL AND SEGMENTAL PROLIFERATIVE GLOMERULONEPHRITIS: ICD-10-CM

## 2024-01-05 DIAGNOSIS — Z94.0 IMMUNOSUPPRESSIVE MANAGEMENT ENCOUNTER FOLLOWING KIDNEY TRANSPLANT (HHS-HCC): ICD-10-CM

## 2024-01-05 LAB
ABO GROUP (TYPE) IN BLOOD: NORMAL
ALBUMIN SERPL BCP-MCNC: 2.8 G/DL (ref 3.4–5)
ALP SERPL-CCNC: 77 U/L (ref 33–136)
ALT SERPL W P-5'-P-CCNC: 14 U/L (ref 10–52)
ANION GAP SERPL CALC-SCNC: 10 MMOL/L (ref 10–20)
ANTIBODY SCREEN: NORMAL
APTT PPP: 27 SECONDS (ref 27–38)
AST SERPL W P-5'-P-CCNC: 13 U/L (ref 9–39)
BASOPHILS # BLD AUTO: 0.01 X10*3/UL (ref 0–0.1)
BASOPHILS NFR BLD AUTO: 0.2 %
BILIRUB SERPL-MCNC: 0.2 MG/DL (ref 0–1.2)
BUN SERPL-MCNC: 48 MG/DL (ref 6–23)
CALCIUM SERPL-MCNC: 8.9 MG/DL (ref 8.6–10.6)
CHLORIDE SERPL-SCNC: 109 MMOL/L (ref 98–107)
CO2 SERPL-SCNC: 27 MMOL/L (ref 21–32)
CREAT SERPL-MCNC: 4.09 MG/DL (ref 0.5–1.3)
EOSINOPHIL # BLD AUTO: 0.04 X10*3/UL (ref 0–0.7)
EOSINOPHIL NFR BLD AUTO: 1 %
ERYTHROCYTE [DISTWIDTH] IN BLOOD BY AUTOMATED COUNT: 14 % (ref 11.5–14.5)
GFR SERPL CREATININE-BSD FRML MDRD: 15 ML/MIN/1.73M*2
GLUCOSE BLD MANUAL STRIP-MCNC: 154 MG/DL (ref 74–99)
GLUCOSE SERPL-MCNC: 220 MG/DL (ref 74–99)
HCT VFR BLD AUTO: 29.9 % (ref 41–52)
HGB BLD-MCNC: 10 G/DL (ref 13.5–17.5)
HOLD SPECIMEN: NORMAL
IMM GRANULOCYTES # BLD AUTO: 0.03 X10*3/UL (ref 0–0.7)
IMM GRANULOCYTES NFR BLD AUTO: 0.7 % (ref 0–0.9)
INR PPP: 1 (ref 0.9–1.1)
LYMPHOCYTES # BLD AUTO: 0.5 X10*3/UL (ref 1.2–4.8)
LYMPHOCYTES NFR BLD AUTO: 12.4 %
MAGNESIUM SERPL-MCNC: 1.94 MG/DL (ref 1.6–2.4)
MCH RBC QN AUTO: 29.6 PG (ref 26–34)
MCHC RBC AUTO-ENTMCNC: 33.4 G/DL (ref 32–36)
MCV RBC AUTO: 89 FL (ref 80–100)
MONOCYTES # BLD AUTO: 0.21 X10*3/UL (ref 0.1–1)
MONOCYTES NFR BLD AUTO: 5.2 %
NEUTROPHILS # BLD AUTO: 3.23 X10*3/UL (ref 1.2–7.7)
NEUTROPHILS NFR BLD AUTO: 80.5 %
NRBC BLD-RTO: 0 /100 WBCS (ref 0–0)
PHOSPHATE SERPL-MCNC: 2.6 MG/DL (ref 2.5–4.9)
PLATELET # BLD AUTO: 111 X10*3/UL (ref 150–450)
POTASSIUM SERPL-SCNC: 5.1 MMOL/L (ref 3.5–5.3)
PROT SERPL-MCNC: 4.8 G/DL (ref 6.4–8.2)
PROTHROMBIN TIME: 10.8 SECONDS (ref 9.8–12.8)
RBC # BLD AUTO: 3.38 X10*6/UL (ref 4.5–5.9)
RH FACTOR (ANTIGEN D): NORMAL
SODIUM SERPL-SCNC: 141 MMOL/L (ref 136–145)
WBC # BLD AUTO: 4 X10*3/UL (ref 4.4–11.3)

## 2024-01-05 PROCEDURE — 80053 COMPREHEN METABOLIC PANEL: CPT

## 2024-01-05 PROCEDURE — 96375 TX/PRO/DX INJ NEW DRUG ADDON: CPT

## 2024-01-05 PROCEDURE — G0378 HOSPITAL OBSERVATION PER HR: HCPCS

## 2024-01-05 PROCEDURE — 86901 BLOOD TYPING SEROLOGIC RH(D): CPT

## 2024-01-05 PROCEDURE — 2500000004 HC RX 250 GENERAL PHARMACY W/ HCPCS (ALT 636 FOR OP/ED): Performed by: STUDENT IN AN ORGANIZED HEALTH CARE EDUCATION/TRAINING PROGRAM

## 2024-01-05 PROCEDURE — 84100 ASSAY OF PHOSPHORUS: CPT

## 2024-01-05 PROCEDURE — 85730 THROMBOPLASTIN TIME PARTIAL: CPT

## 2024-01-05 PROCEDURE — 36415 COLL VENOUS BLD VENIPUNCTURE: CPT

## 2024-01-05 PROCEDURE — 1100000001 HC PRIVATE ROOM DAILY

## 2024-01-05 PROCEDURE — 2500000001 HC RX 250 WO HCPCS SELF ADMINISTERED DRUGS (ALT 637 FOR MEDICARE OP)

## 2024-01-05 PROCEDURE — 2500000005 HC RX 250 GENERAL PHARMACY W/O HCPCS

## 2024-01-05 PROCEDURE — 96374 THER/PROPH/DIAG INJ IV PUSH: CPT

## 2024-01-05 PROCEDURE — 2500000001 HC RX 250 WO HCPCS SELF ADMINISTERED DRUGS (ALT 637 FOR MEDICARE OP): Performed by: STUDENT IN AN ORGANIZED HEALTH CARE EDUCATION/TRAINING PROGRAM

## 2024-01-05 PROCEDURE — 99222 1ST HOSP IP/OBS MODERATE 55: CPT

## 2024-01-05 PROCEDURE — 85025 COMPLETE CBC W/AUTO DIFF WBC: CPT

## 2024-01-05 PROCEDURE — 83735 ASSAY OF MAGNESIUM: CPT

## 2024-01-05 PROCEDURE — 82947 ASSAY GLUCOSE BLOOD QUANT: CPT

## 2024-01-05 RX ORDER — DOCUSATE SODIUM 100 MG/1
100 CAPSULE, LIQUID FILLED ORAL DAILY
Status: DISCONTINUED | OUTPATIENT
Start: 2024-01-05 | End: 2024-01-06 | Stop reason: HOSPADM

## 2024-01-05 RX ORDER — HYDRALAZINE HYDROCHLORIDE 50 MG/1
100 TABLET, FILM COATED ORAL 3 TIMES DAILY
Status: DISCONTINUED | OUTPATIENT
Start: 2024-01-05 | End: 2024-01-06 | Stop reason: HOSPADM

## 2024-01-05 RX ORDER — FERROUS SULFATE 325(65) MG
65 TABLET ORAL 2 TIMES DAILY
Status: DISCONTINUED | OUTPATIENT
Start: 2024-01-05 | End: 2024-01-06 | Stop reason: HOSPADM

## 2024-01-05 RX ORDER — POLYETHYLENE GLYCOL 3350 17 G/17G
17 POWDER, FOR SOLUTION ORAL DAILY
Status: DISCONTINUED | OUTPATIENT
Start: 2024-01-05 | End: 2024-01-05

## 2024-01-05 RX ORDER — AMLODIPINE BESYLATE 10 MG/1
10 TABLET ORAL DAILY
Status: DISCONTINUED | OUTPATIENT
Start: 2024-01-05 | End: 2024-01-06 | Stop reason: HOSPADM

## 2024-01-05 RX ORDER — ACETAMINOPHEN 325 MG/1
650 TABLET ORAL EVERY 8 HOURS PRN
Status: DISCONTINUED | OUTPATIENT
Start: 2024-01-05 | End: 2024-01-06 | Stop reason: HOSPADM

## 2024-01-05 RX ORDER — SULFAMETHOXAZOLE AND TRIMETHOPRIM 400; 80 MG/1; MG/1
1 TABLET ORAL 2 TIMES DAILY
Status: DISCONTINUED | OUTPATIENT
Start: 2024-01-05 | End: 2024-01-06 | Stop reason: HOSPADM

## 2024-01-05 RX ORDER — ACETAMINOPHEN 500 MG
5 TABLET ORAL NIGHTLY
Status: DISCONTINUED | OUTPATIENT
Start: 2024-01-05 | End: 2024-01-06 | Stop reason: HOSPADM

## 2024-01-05 RX ORDER — ACETAMINOPHEN 325 MG/1
650 TABLET ORAL ONCE
Status: DISCONTINUED | OUTPATIENT
Start: 2024-01-05 | End: 2024-01-06 | Stop reason: HOSPADM

## 2024-01-05 RX ORDER — INSULIN GLARGINE 100 [IU]/ML
20 INJECTION, SOLUTION SUBCUTANEOUS EVERY MORNING
COMMUNITY
End: 2024-03-11 | Stop reason: SDUPTHER

## 2024-01-05 RX ORDER — CARVEDILOL 12.5 MG/1
37.5 TABLET ORAL 2 TIMES DAILY
Status: DISCONTINUED | OUTPATIENT
Start: 2024-01-05 | End: 2024-01-06 | Stop reason: HOSPADM

## 2024-01-05 RX ORDER — CHOLECALCIFEROL (VITAMIN D3) 25 MCG
1000 TABLET ORAL DAILY
Status: DISCONTINUED | OUTPATIENT
Start: 2024-01-05 | End: 2024-01-06 | Stop reason: HOSPADM

## 2024-01-05 RX ORDER — TACROLIMUS 0.5 MG/1
2.5 CAPSULE ORAL
Status: DISCONTINUED | OUTPATIENT
Start: 2024-01-05 | End: 2024-01-06 | Stop reason: HOSPADM

## 2024-01-05 RX ORDER — DIPHENHYDRAMINE HYDROCHLORIDE 50 MG/ML
25 INJECTION INTRAMUSCULAR; INTRAVENOUS ONCE
Status: COMPLETED | OUTPATIENT
Start: 2024-01-05 | End: 2024-01-05

## 2024-01-05 RX ORDER — LIDOCAINE 560 MG/1
1 PATCH PERCUTANEOUS; TOPICAL; TRANSDERMAL DAILY
Status: DISCONTINUED | OUTPATIENT
Start: 2024-01-05 | End: 2024-01-06 | Stop reason: HOSPADM

## 2024-01-05 RX ORDER — POLYETHYLENE GLYCOL 3350 17 G/17G
17 POWDER, FOR SOLUTION ORAL DAILY
Status: DISCONTINUED | OUTPATIENT
Start: 2024-01-05 | End: 2024-01-06 | Stop reason: HOSPADM

## 2024-01-05 RX ORDER — CLOTRIMAZOLE 10 MG/1
10 LOZENGE ORAL; TOPICAL 3 TIMES DAILY
Status: DISCONTINUED | OUTPATIENT
Start: 2024-01-05 | End: 2024-01-06 | Stop reason: HOSPADM

## 2024-01-05 RX ORDER — PRAVASTATIN SODIUM 20 MG/1
10 TABLET ORAL NIGHTLY
Status: DISCONTINUED | OUTPATIENT
Start: 2024-01-05 | End: 2024-01-06 | Stop reason: HOSPADM

## 2024-01-05 RX ORDER — CALCITRIOL 0.25 UG/1
0.25 CAPSULE ORAL DAILY
Status: DISCONTINUED | OUTPATIENT
Start: 2024-01-05 | End: 2024-01-06 | Stop reason: HOSPADM

## 2024-01-05 RX ORDER — PREDNISONE 20 MG/1
10 TABLET ORAL EVERY MORNING
Status: DISCONTINUED | OUTPATIENT
Start: 2024-01-06 | End: 2024-01-06 | Stop reason: HOSPADM

## 2024-01-05 RX ORDER — ISOSORBIDE MONONITRATE 30 MG/1
60 TABLET, EXTENDED RELEASE ORAL DAILY
Status: DISCONTINUED | OUTPATIENT
Start: 2024-01-05 | End: 2024-01-06 | Stop reason: HOSPADM

## 2024-01-05 RX ORDER — INSULIN LISPRO 100 [IU]/ML
INJECTION, SOLUTION INTRAVENOUS; SUBCUTANEOUS
COMMUNITY

## 2024-01-05 RX ORDER — TACROLIMUS 1 MG/G
OINTMENT TOPICAL 2 TIMES DAILY
Status: DISCONTINUED | OUTPATIENT
Start: 2024-01-05 | End: 2024-01-06 | Stop reason: HOSPADM

## 2024-01-05 RX ORDER — FUROSEMIDE 40 MG/1
40 TABLET ORAL EVERY 12 HOURS SCHEDULED
Status: DISCONTINUED | OUTPATIENT
Start: 2024-01-05 | End: 2024-01-06 | Stop reason: HOSPADM

## 2024-01-05 RX ORDER — OXYCODONE HYDROCHLORIDE 5 MG/1
5 TABLET ORAL ONCE
Status: COMPLETED | OUTPATIENT
Start: 2024-01-05 | End: 2024-01-05

## 2024-01-05 RX ORDER — LOSARTAN POTASSIUM 25 MG/1
25 TABLET ORAL DAILY
Status: DISCONTINUED | OUTPATIENT
Start: 2024-01-05 | End: 2024-01-06

## 2024-01-05 RX ORDER — HEPARIN SODIUM 5000 [USP'U]/ML
5000 INJECTION, SOLUTION INTRAVENOUS; SUBCUTANEOUS EVERY 8 HOURS SCHEDULED
Status: DISCONTINUED | OUTPATIENT
Start: 2024-01-05 | End: 2024-01-06 | Stop reason: HOSPADM

## 2024-01-05 RX ORDER — CINACALCET 30 MG/1
30 TABLET, FILM COATED ORAL DAILY
Status: DISCONTINUED | OUTPATIENT
Start: 2024-01-05 | End: 2024-01-06 | Stop reason: HOSPADM

## 2024-01-05 RX ORDER — AZATHIOPRINE 50 MG/1
50 TABLET ORAL DAILY
Status: DISCONTINUED | OUTPATIENT
Start: 2024-01-05 | End: 2024-01-06 | Stop reason: HOSPADM

## 2024-01-05 RX ADMIN — CARVEDILOL 37.5 MG: 12.5 TABLET, FILM COATED ORAL at 22:11

## 2024-01-05 RX ADMIN — PRAVASTATIN SODIUM 10 MG: 20 TABLET ORAL at 22:10

## 2024-01-05 RX ADMIN — SODIUM CHLORIDE 1000 MG: 9 INJECTION, SOLUTION INTRAVENOUS at 22:11

## 2024-01-05 RX ADMIN — METHYLPREDNISOLONE SODIUM SUCCINATE 100 MG: 125 INJECTION, POWDER, FOR SOLUTION INTRAMUSCULAR; INTRAVENOUS at 19:40

## 2024-01-05 RX ADMIN — CLOTRIMAZOLE 10 MG: 10 LOZENGE ORAL; TOPICAL at 22:11

## 2024-01-05 RX ADMIN — DIPHENHYDRAMINE HYDROCHLORIDE 25 MG: 50 INJECTION INTRAMUSCULAR; INTRAVENOUS at 19:39

## 2024-01-05 RX ADMIN — FERROUS SULFATE TAB 325 MG (65 MG ELEMENTAL FE) 1 TABLET: 325 (65 FE) TAB at 22:11

## 2024-01-05 RX ADMIN — FUROSEMIDE 40 MG: 40 TABLET ORAL at 22:11

## 2024-01-05 RX ADMIN — SULFAMETHOXAZOLE AND TRIMETHOPRIM 1 TABLET: 400; 80 TABLET ORAL at 22:11

## 2024-01-05 RX ADMIN — HEPARIN SODIUM 5000 UNITS: 5000 INJECTION INTRAVENOUS; SUBCUTANEOUS at 22:11

## 2024-01-05 RX ADMIN — Medication 5 MG: at 22:11

## 2024-01-05 RX ADMIN — TACROLIMUS: 1 OINTMENT TOPICAL at 22:11

## 2024-01-05 RX ADMIN — ACETAMINOPHEN 650 MG: 325 TABLET ORAL at 18:07

## 2024-01-05 RX ADMIN — HYDRALAZINE HYDROCHLORIDE 100 MG: 50 TABLET ORAL at 22:11

## 2024-01-05 RX ADMIN — TACROLIMUS 2.5 MG: 0.5 CAPSULE ORAL at 18:07

## 2024-01-05 RX ADMIN — OXYCODONE HYDROCHLORIDE 5 MG: 5 TABLET ORAL at 22:11

## 2024-01-05 RX ADMIN — LIDOCAINE 1 PATCH: 4 PATCH TOPICAL at 19:40

## 2024-01-05 SDOH — SOCIAL STABILITY: SOCIAL INSECURITY: ARE THERE ANY APPARENT SIGNS OF INJURIES/BEHAVIORS THAT COULD BE RELATED TO ABUSE/NEGLECT?: NO

## 2024-01-05 SDOH — SOCIAL STABILITY: SOCIAL INSECURITY: HAVE YOU HAD THOUGHTS OF HARMING ANYONE ELSE?: NO

## 2024-01-05 SDOH — SOCIAL STABILITY: SOCIAL INSECURITY: DO YOU FEEL ANYONE HAS EXPLOITED OR TAKEN ADVANTAGE OF YOU FINANCIALLY OR OF YOUR PERSONAL PROPERTY?: NO

## 2024-01-05 SDOH — SOCIAL STABILITY: SOCIAL INSECURITY: HAS ANYONE EVER THREATENED TO HURT YOUR FAMILY OR YOUR PETS?: NO

## 2024-01-05 SDOH — SOCIAL STABILITY: SOCIAL INSECURITY: DO YOU FEEL UNSAFE GOING BACK TO THE PLACE WHERE YOU ARE LIVING?: NO

## 2024-01-05 SDOH — SOCIAL STABILITY: SOCIAL INSECURITY: DOES ANYONE TRY TO KEEP YOU FROM HAVING/CONTACTING OTHER FRIENDS OR DOING THINGS OUTSIDE YOUR HOME?: NO

## 2024-01-05 SDOH — SOCIAL STABILITY: SOCIAL INSECURITY: ARE YOU OR HAVE YOU BEEN THREATENED OR ABUSED PHYSICALLY, EMOTIONALLY, OR SEXUALLY BY ANYONE?: NO

## 2024-01-05 SDOH — SOCIAL STABILITY: SOCIAL INSECURITY: ABUSE: ADULT

## 2024-01-05 SDOH — SOCIAL STABILITY: SOCIAL INSECURITY: WERE YOU ABLE TO COMPLETE ALL THE BEHAVIORAL HEALTH SCREENINGS?: YES

## 2024-01-05 ASSESSMENT — COGNITIVE AND FUNCTIONAL STATUS - GENERAL
DRESSING REGULAR LOWER BODY CLOTHING: A LITTLE
PATIENT BASELINE BEDBOUND: NO
MOBILITY SCORE: 22
DAILY ACTIVITIY SCORE: 21
HELP NEEDED FOR BATHING: A LITTLE
WALKING IN HOSPITAL ROOM: A LITTLE
CLIMB 3 TO 5 STEPS WITH RAILING: A LITTLE
DRESSING REGULAR UPPER BODY CLOTHING: A LITTLE

## 2024-01-05 ASSESSMENT — ACTIVITIES OF DAILY LIVING (ADL)
LACK_OF_TRANSPORTATION: NO
GROOMING: INDEPENDENT
BATHING: INDEPENDENT
JUDGMENT_ADEQUATE_SAFELY_COMPLETE_DAILY_ACTIVITIES: YES
HEARING - LEFT EAR: FUNCTIONAL
PATIENT'S MEMORY ADEQUATE TO SAFELY COMPLETE DAILY ACTIVITIES?: YES
DRESSING YOURSELF: INDEPENDENT
ADEQUATE_TO_COMPLETE_ADL: YES
HEARING - RIGHT EAR: FUNCTIONAL
FEEDING YOURSELF: INDEPENDENT
TOILETING: INDEPENDENT
WALKS IN HOME: INDEPENDENT

## 2024-01-05 ASSESSMENT — ENCOUNTER SYMPTOMS
ACTIVITY CHANGE: 0
CONSTIPATION: 0
DYSURIA: 1
SHORTNESS OF BREATH: 0
DIARRHEA: 0
BACK PAIN: 1
APPETITE CHANGE: 0
APNEA: 0
ABDOMINAL PAIN: 0
DIFFICULTY URINATING: 1
CHEST TIGHTNESS: 0

## 2024-01-05 ASSESSMENT — PAIN - FUNCTIONAL ASSESSMENT: PAIN_FUNCTIONAL_ASSESSMENT: 0-10

## 2024-01-05 ASSESSMENT — PAIN DESCRIPTION - LOCATION: LOCATION: BACK

## 2024-01-05 ASSESSMENT — PATIENT HEALTH QUESTIONNAIRE - PHQ9
SUM OF ALL RESPONSES TO PHQ9 QUESTIONS 1 & 2: 2
2. FEELING DOWN, DEPRESSED OR HOPELESS: SEVERAL DAYS
1. LITTLE INTEREST OR PLEASURE IN DOING THINGS: SEVERAL DAYS

## 2024-01-05 ASSESSMENT — LIFESTYLE VARIABLES
PRESCIPTION_ABUSE_PAST_12_MONTHS: NO
HOW MANY STANDARD DRINKS CONTAINING ALCOHOL DO YOU HAVE ON A TYPICAL DAY: 1 OR 2
SUBSTANCE_ABUSE_PAST_12_MONTHS: NO
HOW OFTEN DO YOU HAVE 6 OR MORE DRINKS ON ONE OCCASION: NEVER
HOW OFTEN DO YOU HAVE A DRINK CONTAINING ALCOHOL: MONTHLY OR LESS
SKIP TO QUESTIONS 9-10: 1
AUDIT-C TOTAL SCORE: 1
AUDIT-C TOTAL SCORE: 1

## 2024-01-05 ASSESSMENT — PAIN SCALES - GENERAL: PAINLEVEL_OUTOF10: 10 - WORST POSSIBLE PAIN

## 2024-01-05 ASSESSMENT — PAIN DESCRIPTION - ORIENTATION: ORIENTATION: LOWER

## 2024-01-05 ASSESSMENT — COLUMBIA-SUICIDE SEVERITY RATING SCALE - C-SSRS
1. IN THE PAST MONTH, HAVE YOU WISHED YOU WERE DEAD OR WISHED YOU COULD GO TO SLEEP AND NOT WAKE UP?: NO
2. HAVE YOU ACTUALLY HAD ANY THOUGHTS OF KILLING YOURSELF?: NO
6. HAVE YOU EVER DONE ANYTHING, STARTED TO DO ANYTHING, OR PREPARED TO DO ANYTHING TO END YOUR LIFE?: NO

## 2024-01-05 NOTE — H&P
History Of Present Illness  Manolo Bashir is a 68 y.o. male with a PMH of ESRD s/p formerly on HD then 2x renal transplants (1992, 2013, currently on prednisone, tacrolimus, azathioprine, last baseline Cr 2.5-2.8), pancytopenia, angina (last EF 60-65% 2/201), IDDM2 (last A1c 6.0 % 10/26), HTN, prostate cancer s/p radical prostatectomy, HCV (treated and RNA not detected last 10/18/23) who has had multiple presentations for anasarca and poor urine output iso of transplant kidney. He is being admitted today for expedited rituximab infusion.    Patient does not have any new complaints for us today. He is not quite sure why he has been admitted and we explained to him the need for him to receive his rituxumab infusion. Patient does endorse his chronic back pain but denies other complaints including nausea, vomiting, diarrhea, constipation, and dysuria. He feels well and is eager to receive his infusion then leave.    Spoke with the transplant nephrology team and they recommended continuing his home medications and plan to give the Rituximab infusion 1/6/2024 AM.    Renal Hx:  S/p kidney transplant x2  Baseline creatinine 2.5-3  On azathioprine, tacrolimus, and prednisone as well as rituximab infusions.  Renal biopsy (Nov 2023):  no evidence of active ACR or ABMR, did show focal proliferative GN with 1 cellular crescent, . IF showed 1+ staining for IgM and C3. Consistent with immune complex glomerulonephritis. also noted was severe arteriolar hyalinosis and arteriosclerosis.         Past Medical History  Past Medical History:   Diagnosis Date    Chronic kidney disease, stage 3 unspecified (CMS/HCC) 09/26/2018    Stage 3 chronic kidney disease    COVID-19 06/18/2020    COVID-19 virus infection    Diabetes (CMS/[facility])     Focal and segmental proliferative glomerulonephritis 12/23/2023    HTN (hypertension)     Other long term (current) drug therapy 07/20/2021    High risk medication use    Personal history of other diseases  of the circulatory system     Personal history of cardiac murmur    Personal history of other infectious and parasitic diseases 08/17/2015    History of hepatitis    Polyp, colonic 08/17/2023    Primary osteoarthritis of both ankles 08/17/2023    Tubular adenoma of colon 08/17/2023    Unspecified kidney failure 08/17/2016    Renal failure       Surgical History  Past Surgical History:   Procedure Laterality Date    ILEOSTOMY  04/25/2017    Ileostomy    ILEOSTOMY CLOSURE  08/17/2015    Ileostomy Closure    OTHER SURGICAL HISTORY  04/21/2017    Right Hemicolectomy    OTHER SURGICAL HISTORY  08/17/2015    Arteriovenous Surgery Creation Of A-V Fistula    OTHER SURGICAL HISTORY  08/17/2015    Sigmoidoscopy (Fiberoptic, Therapeutic )    PROSTATECTOMY  10/11/2013    Prostatectomy Radical    TRANSPLANT, KIDNEY, OPEN  1992    TRANSPLANT, KIDNEY, OPEN  2013    US GUIDED PERCUTANEOUS BIOPSY RENAL LEFT Left 11/20/2023    US GUIDED PERCUTANEOUS BIOPSY RENAL LEFT 11/20/2023 Lou Rodgers MD [facility]    US GUIDED PERCUTANEOUS PERITONEAL OR RETROPERITONEAL FLUID COLLECTION DRAINAGE  10/20/2022    US GUIDED PERCUTANEOUS PERITONEAL OR RETROPERITONEAL FLUID COLLECTION DRAINAGE 10/20/2022 [facility] CL[facility]ICAL LEGAC[facility]        Social History  He reports that he has never smoked. He has never used smokeless tobacco. He reports current alcohol use. He reports current drug use. Drug: Oxycodone.    Family History  Family History   Problem Relation Name Age of Onset    Bone cancer Mother      Other (corona's sarcome of the bone marrow) Mother      Prostate cancer Father      Diabetes Other Family Hist     Hypertension Other Family Hist         Allergies  Patient has no known allergies.    Review of Systems   Constitutional:  Negative for activity change and appetite change.   Respiratory:  Negative for apnea, chest tightness and shortness of breath.    Cardiovascular:  Negative for chest pain.   Gastrointestinal:  Negative for abdominal pain, constipation  and diarrhea.   Genitourinary:  Positive for difficulty urinating and dysuria.   Musculoskeletal:  Positive for back pain.     Physical Exam  Constitutional:       Appearance: Normal appearance.   HENT:      Head: Normocephalic and atraumatic.   Cardiovascular:      Rate and Rhythm: Normal rate and regular rhythm.      Pulses: Normal pulses.      Heart sounds: Normal heart sounds.   Pulmonary:      Effort: No respiratory distress.   Abdominal:      General: Bowel sounds are normal.      Palpations: Abdomen is soft.   Skin:     General: Skin is warm and dry.   Neurological:      Mental Status: He is alert and oriented to person, place, and time.       Last Recorded Vitals  There were no vitals taken for this visit.    Assessment/Plan   Principal Problem:    Immunosuppressive management encounter following kidney transplant  Active Problems:    Kidney transplanted  Assessment  Pt is an 69 y/o M w/ a PMH of ESRD s/p formerly on HD s/p renal transplants x2 (1992, 2013, currently on prednisone, tacrolimus, azathioprine, last baseline Cr 2.5-2.8), pancytopenia, angina (last EF 60-65% 2/201), IDDM2 (last A1c 6.0 % 10/26), HTN, prostate cancer s/p radical prostatectomy, HCV (treated and RNA not detected last 10/18/23) with multiple presentations for anasarca and poor urine output iso of transplant kidney. Currently admitted for expedited rituximab infusion. In discussion with transplant nephrology regarding transplant medications.    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 Will premedicate with methylprednisone, acetaminophen, and benadryl  -Continuing home tacrolimus 2.5 mg q12, azathioprine, 50 mg daily, and prednisone 10mg daily  -Continue home cinacalcet 30 mg, calcitriol 0.25mcg  -Continue home  cholecalciferol  -Continue home lasix 40mg BID    #HTN  #HLD  #Angina  - Continue home Hydralazine 25 mg PO TID  - Continue home Amlodipine 10 mg PO daily  - Continue home Carvedilol 37.5mg PO BID   - Continue home Imdur 60 mg  - Continue home pravastatin 10 mg  - Hold home Losartan 25mg PO daily 2/2 ISIAH  - CTM BPs    #Hx of Back pain  ::MRI 09/19/2023 showing nonspecific focus of abnormal signal in R pedicle of L4 c/w osseous hemangioma, stress fracture, or osteoid osteoma. No discitis or osteomyelitis. No evidence of metastasis.  -Tylenol 650 mg q8h PRN    #MGUS  #Pancytopenia  :: Bone marrow biopsy done 10/26/2023 showing hypercellular bone marrow for 50% with maturing trilineage hematopoiesis and involved 5% by plasma cell neoplasm, FISH negative for gain of function mutation, hyper triploidy, rearrangement of IgH, deletion of TP53.  :: Skeletal survey on 11/19/2020 negative for osteolytic lesions  :: Likely chronic in nature  :: 12/16 WBC 4 Hgb 10.3 Plt 109  - CTM daily CBCs  - To continue mgmt w/ outpatient hematology     F: PRN  E: PRN  N; Low Na diet  A: PIV  GI Ppx: None   DVT PPx: SQH  Code Status: Full Code  NOK/MDM Amalia Enriquez (significant other) [phone]  Purnima Bashir (daughter) [phone]       Celestine Cramer MD

## 2024-01-05 NOTE — ED NOTES
"Pharmacy Medication History Review    Manolo Bashir is a 68 y.o. male admitted for Immunosuppressive management encounter following kidney transplant. Pharmacy reviewed the patient's psynj-us-puidmolpk medications and allergies for accuracy.    The list below reflects the updated PTA list. Comments regarding how patient may be taking medications differently can be found in the Admit Orders Activity  Prior to Admission Medications   Prescriptions Last Dose Informant Patient Reported? Taking?   FeroSuL tablet  Self Yes Yes   Sig: Take 1 tablet by mouth 2 times a day.   TechLITE Pen Needle 32 gauge x \" needle  Self Yes Yes   Unilet Super Thin Lancets 30 gauge misc  Self Yes Yes   Si each 3 times a day.   amLODIPine (Norvasc) 10 mg tablet  Self Yes Yes   Sig: Take 1 tablet (10 mg) by mouth once daily.   azaTHIOprine (Imuran) 50 mg tablet  Self Yes Yes   Sig: Take 1 tablet (50 mg) by mouth once daily. Do not start before 2023.   calcitriol (Rocaltrol) 0.25 mcg capsule  Self Yes Yes   Sig: Take 1 capsule (0.25 mcg) by mouth once daily. Do not start before 2023.   carvedilol (Coreg) 12.5 mg tablet  Self Yes Yes   Sig: Take 3 tablets (37.5 mg) by mouth 2 times a day.   cholecalciferol (Vitamin D-3) 25 MCG (1000 UT) tablet  Self Yes Yes   Sig: Take 1 tablet (1,000 Units) by mouth once daily. Do not start before 2023.   cinacalcet (Sensipar) 30 mg tablet  Self Yes Yes   Sig: Take 1 tablet (30 mg) by mouth once daily. Take with food or shortly afer a meal. Swallow tablet whole; do not break or divide. Do not start before 2023.   clotrimazole (Mycelex) 10 mg dariel  Self Yes Yes   Sig: Take 1 tablet (10 mg) by mouth 3 times a day.   docusate sodium (Colace) 100 mg capsule  Self Yes No   Sig: Take 1 capsule (100 mg) by mouth once daily. Do not start before 2023.   furosemide (Lasix) 40 mg tablet  Self Yes Yes   Sig: Take 1 tablet (40 mg) by mouth 2 times a " "day.   hydrALAZINE (Apresoline) 100 mg tablet  Self Yes Yes   Sig: Take 1 tablet (100 mg) by mouth 3 times a day.   insulin glargine (Lantus U-100 Insulin) 100 unit/mL injection  Self Yes Yes   Sig: Inject 20 Units under the skin once every 24 hours. Take as directed per insulin instructions.   insulin lispro (HumaLOG) 100 unit/mL injection  Self Yes Yes   Sig: Inject under the skin 3 times a day with meals. 100-199 2 units 200-299 4 units 300-399 6 units   isosorbide mononitrate ER (Imdur) 60 mg 24 hr tablet  Self Yes Yes   Sig: Take 1 tablet (60 mg) by mouth once daily. Do not crush or chew. Do not start before 2023.   lancets (Unilet Super Thin Lancets) 30 gauge misc  Self Yes Yes   Sig: USE TO TEST BLOOD SUGAR THREE TIMES A DAY   losartan (Cozaar) 25 mg tablet  Self Yes Yes   Sig: Take 1 tablet (25 mg) by mouth once daily.   pen needle, diabetic (TechLITE Pen Needle) 32 gauge x 5/32\" needle  Self Yes Yes   Si each 3 times a day.   polyethylene glycol (Glycolax, Miralax) 17 gram packet  Self Yes No   Sig: Take 17 g by mouth once daily. Do not start before 2023.   pravastatin (Pravachol) 10 mg tablet  Self Yes Yes   Sig: Take 1 tablet (10 mg) by mouth once daily at bedtime.   predniSONE (Deltasone) 5 mg tablet  Self Yes Yes   Sig: Take 2 tablets (10 mg) by mouth once daily in the morning.   sulfamethoxazole-trimethoprim (Bactrim) 400-80 mg tablet  Self Yes Yes   Sig: Take 1 tablet by mouth 2 times a day.   tacrolimus (Protopic) 0.1 % ointment  Self Yes Yes   Sig: Apply topically 2 times a day.   tacrolimus 0.5 mg capsule  Self Yes Yes   Sig: Take 2.5 mg by mouth every 12 hours.      Facility-Administered Medications: None        The list below reflects the updated allergy list. Please review each documented allergy for additional clarification and justification.  Allergies  Reviewed by Celestine Cramer MD on 2024   No Known Allergies         Patient accepts M2B at discharge. Pharmacy has " been updated to [facility].    Sources used to complete the med history include medication dispense history, care everywhere, OARRS, and patient interview. Patient had spouse on the phone to help clearify strength and schedule of medications    Below are additional concerns with the patient's PTA list.  - Patient has not used Miralax or Colace since last admission. Reports he does not need to use them.  - Amlodipine last filled on 9/26 for a 30 day supply. Patient reported he is still using.    Nithin GrayD   Meds PGY1 Pharmacy Resident   [facility]  Please reach out via [employer] Secure Chat for questions, or if no response call [employer] or [employer] “Med[facility]”

## 2024-01-06 ENCOUNTER — PHARMACY VISIT (OUTPATIENT)
Dept: PHARMACY | Facility: CLINIC | Age: 69
End: 2024-01-06
Payer: COMMERCIAL

## 2024-01-06 VITALS
SYSTOLIC BLOOD PRESSURE: 160 MMHG | BODY MASS INDEX: 27.55 KG/M2 | TEMPERATURE: 98.8 F | OXYGEN SATURATION: 99 % | HEIGHT: 68 IN | HEART RATE: 69 BPM | RESPIRATION RATE: 19 BRPM | DIASTOLIC BLOOD PRESSURE: 77 MMHG | WEIGHT: 181.8 LBS

## 2024-01-06 LAB
ALBUMIN SERPL BCP-MCNC: 2.5 G/DL (ref 3.4–5)
ANION GAP SERPL CALC-SCNC: 11 MMOL/L (ref 10–20)
BASOPHILS # BLD AUTO: 0 X10*3/UL (ref 0–0.1)
BASOPHILS NFR BLD AUTO: 0 %
BUN SERPL-MCNC: 50 MG/DL (ref 6–23)
CALCIUM SERPL-MCNC: 8.9 MG/DL (ref 8.6–10.6)
CHLORIDE SERPL-SCNC: 110 MMOL/L (ref 98–107)
CO2 SERPL-SCNC: 24 MMOL/L (ref 21–32)
CREAT SERPL-MCNC: 3.93 MG/DL (ref 0.5–1.3)
EOSINOPHIL # BLD AUTO: 0 X10*3/UL (ref 0–0.7)
EOSINOPHIL NFR BLD AUTO: 0 %
ERYTHROCYTE [DISTWIDTH] IN BLOOD BY AUTOMATED COUNT: 13.3 % (ref 11.5–14.5)
GFR SERPL CREATININE-BSD FRML MDRD: 16 ML/MIN/1.73M*2
GLUCOSE BLD MANUAL STRIP-MCNC: 219 MG/DL (ref 74–99)
GLUCOSE BLD MANUAL STRIP-MCNC: 220 MG/DL (ref 74–99)
GLUCOSE SERPL-MCNC: 239 MG/DL (ref 74–99)
HCT VFR BLD AUTO: 28.9 % (ref 41–52)
HGB BLD-MCNC: 9.2 G/DL (ref 13.5–17.5)
IMM GRANULOCYTES # BLD AUTO: 0.03 X10*3/UL (ref 0–0.7)
IMM GRANULOCYTES NFR BLD AUTO: 0.8 % (ref 0–0.9)
LYMPHOCYTES # BLD AUTO: 0.49 X10*3/UL (ref 1.2–4.8)
LYMPHOCYTES NFR BLD AUTO: 12.4 %
MAGNESIUM SERPL-MCNC: 1.89 MG/DL (ref 1.6–2.4)
MCH RBC QN AUTO: 27.9 PG (ref 26–34)
MCHC RBC AUTO-ENTMCNC: 31.8 G/DL (ref 32–36)
MCV RBC AUTO: 88 FL (ref 80–100)
MONOCYTES # BLD AUTO: 0.09 X10*3/UL (ref 0.1–1)
MONOCYTES NFR BLD AUTO: 2.3 %
NEUTROPHILS # BLD AUTO: 3.33 X10*3/UL (ref 1.2–7.7)
NEUTROPHILS NFR BLD AUTO: 84.5 %
NRBC BLD-RTO: 0 /100 WBCS (ref 0–0)
PHOSPHATE SERPL-MCNC: 2.9 MG/DL (ref 2.5–4.9)
PLATELET # BLD AUTO: 113 X10*3/UL (ref 150–450)
POTASSIUM SERPL-SCNC: 5.4 MMOL/L (ref 3.5–5.3)
RBC # BLD AUTO: 3.3 X10*6/UL (ref 4.5–5.9)
SODIUM SERPL-SCNC: 140 MMOL/L (ref 136–145)
WBC # BLD AUTO: 3.9 X10*3/UL (ref 4.4–11.3)

## 2024-01-06 PROCEDURE — 99233 SBSQ HOSP IP/OBS HIGH 50: CPT | Performed by: HOSPITALIST

## 2024-01-06 PROCEDURE — 2500000001 HC RX 250 WO HCPCS SELF ADMINISTERED DRUGS (ALT 637 FOR MEDICARE OP): Performed by: STUDENT IN AN ORGANIZED HEALTH CARE EDUCATION/TRAINING PROGRAM

## 2024-01-06 PROCEDURE — 80069 RENAL FUNCTION PANEL: CPT

## 2024-01-06 PROCEDURE — 83735 ASSAY OF MAGNESIUM: CPT

## 2024-01-06 PROCEDURE — 2500000004 HC RX 250 GENERAL PHARMACY W/ HCPCS (ALT 636 FOR OP/ED): Performed by: STUDENT IN AN ORGANIZED HEALTH CARE EDUCATION/TRAINING PROGRAM

## 2024-01-06 PROCEDURE — 2500000002 HC RX 250 W HCPCS SELF ADMINISTERED DRUGS (ALT 637 FOR MEDICARE OP, ALT 636 FOR OP/ED): Performed by: STUDENT IN AN ORGANIZED HEALTH CARE EDUCATION/TRAINING PROGRAM

## 2024-01-06 PROCEDURE — 99239 HOSP IP/OBS DSCHRG MGMT >30: CPT | Performed by: INTERNAL MEDICINE

## 2024-01-06 PROCEDURE — G0378 HOSPITAL OBSERVATION PER HR: HCPCS

## 2024-01-06 PROCEDURE — 85025 COMPLETE CBC W/AUTO DIFF WBC: CPT

## 2024-01-06 PROCEDURE — 82947 ASSAY GLUCOSE BLOOD QUANT: CPT

## 2024-01-06 PROCEDURE — 36415 COLL VENOUS BLD VENIPUNCTURE: CPT

## 2024-01-06 PROCEDURE — RXMED WILLOW AMBULATORY MEDICATION CHARGE

## 2024-01-06 RX ORDER — INSULIN GLARGINE 100 [IU]/ML
20 INJECTION, SOLUTION SUBCUTANEOUS DAILY
Status: DISCONTINUED | OUTPATIENT
Start: 2024-01-06 | End: 2024-01-06 | Stop reason: HOSPADM

## 2024-01-06 RX ORDER — DEXTROSE MONOHYDRATE 100 MG/ML
0.3 INJECTION, SOLUTION INTRAVENOUS ONCE AS NEEDED
Status: DISCONTINUED | OUTPATIENT
Start: 2024-01-06 | End: 2024-01-06 | Stop reason: HOSPADM

## 2024-01-06 RX ORDER — DEXTROSE 50 % IN WATER (D50W) INTRAVENOUS SYRINGE
25
Status: DISCONTINUED | OUTPATIENT
Start: 2024-01-06 | End: 2024-01-06 | Stop reason: HOSPADM

## 2024-01-06 RX ORDER — SODIUM POLYSTYRENE SULFONATE 4.1 MEQ/G
15 POWDER, FOR SUSPENSION ORAL; RECTAL DAILY
Qty: 105 G | Refills: 0 | Status: SHIPPED | OUTPATIENT
Start: 2024-01-06 | End: 2024-01-06 | Stop reason: HOSPADM

## 2024-01-06 RX ADMIN — FUROSEMIDE 40 MG: 40 TABLET ORAL at 08:16

## 2024-01-06 RX ADMIN — PREDNISONE 10 MG: 20 TABLET ORAL at 08:16

## 2024-01-06 RX ADMIN — AZATHIOPRINE 50 MG: 50 TABLET ORAL at 08:15

## 2024-01-06 RX ADMIN — SULFAMETHOXAZOLE AND TRIMETHOPRIM 1 TABLET: 400; 80 TABLET ORAL at 08:15

## 2024-01-06 RX ADMIN — FERROUS SULFATE TAB 325 MG (65 MG ELEMENTAL FE) 1 TABLET: 325 (65 FE) TAB at 08:16

## 2024-01-06 RX ADMIN — HYDRALAZINE HYDROCHLORIDE 100 MG: 50 TABLET ORAL at 08:16

## 2024-01-06 RX ADMIN — CARVEDILOL 37.5 MG: 12.5 TABLET, FILM COATED ORAL at 08:16

## 2024-01-06 RX ADMIN — INSULIN GLARGINE 20 UNITS: 100 INJECTION, SOLUTION SUBCUTANEOUS at 08:22

## 2024-01-06 RX ADMIN — HEPARIN SODIUM 5000 UNITS: 5000 INJECTION INTRAVENOUS; SUBCUTANEOUS at 06:42

## 2024-01-06 RX ADMIN — CINACALCET 30 MG: 30 TABLET ORAL at 08:16

## 2024-01-06 RX ADMIN — ACETAMINOPHEN 650 MG: 325 TABLET ORAL at 08:22

## 2024-01-06 RX ADMIN — TACROLIMUS: 1 OINTMENT TOPICAL at 08:29

## 2024-01-06 RX ADMIN — Medication 1000 UNITS: at 08:16

## 2024-01-06 RX ADMIN — LOSARTAN POTASSIUM 25 MG: 25 TABLET, FILM COATED ORAL at 08:16

## 2024-01-06 RX ADMIN — ISOSORBIDE MONONITRATE 60 MG: 30 TABLET, EXTENDED RELEASE ORAL at 08:16

## 2024-01-06 RX ADMIN — CLOTRIMAZOLE 10 MG: 10 LOZENGE ORAL; TOPICAL at 08:16

## 2024-01-06 RX ADMIN — TACROLIMUS 2.5 MG: 0.5 CAPSULE ORAL at 06:42

## 2024-01-06 RX ADMIN — AMLODIPINE BESYLATE 10 MG: 10 TABLET ORAL at 08:16

## 2024-01-06 RX ADMIN — CALCITRIOL CAPSULES 0.25 MCG 0.25 MCG: 0.25 CAPSULE ORAL at 08:15

## 2024-01-06 ASSESSMENT — COGNITIVE AND FUNCTIONAL STATUS - GENERAL
MOBILITY SCORE: 22
HELP NEEDED FOR BATHING: A LITTLE
CLIMB 3 TO 5 STEPS WITH RAILING: A LITTLE
DRESSING REGULAR LOWER BODY CLOTHING: A LITTLE
DAILY ACTIVITIY SCORE: 21
DRESSING REGULAR UPPER BODY CLOTHING: A LITTLE
WALKING IN HOSPITAL ROOM: A LITTLE

## 2024-01-06 ASSESSMENT — PAIN SCALES - GENERAL: PAINLEVEL_OUTOF10: 0 - NO PAIN

## 2024-01-06 NOTE — DISCHARGE SUMMARY
Discharge Diagnosis  Immunosuppressive management encounter following kidney transplant    Issues Requiring Follow-Up  [ ] Losartan held this admission in the setting of ISIAH and high potassium (5.4). Outpatient provider to decide on when to resume these medications and can titrate other blood pressure meds accordingly. Patient has [facility] lab draw scheduled for 1/8/2024    Test Results Pending At Discharge  Pending Labs       No current pending labs.            Hospital Course  Manolo Bashir is a 68 y.o. male with a PMH of ESRD s/p formerly on HD then 2x renal transplants (1992, 2013, currently on prednisone, tacrolimus, azathioprine, last baseline Cr 2.5-2.8), pancytopenia, angina (last EF 60-65% 2/201), IDDM2 (last A1c 6.0 % 10/26), HTN, prostate cancer s/p radical prostatectomy, HCV (treated and RNA not detected last 10/18/23) who has had multiple presentations for anasarca and poor urine output iso of transplant kidney. He was admitted 1/5/24 for expedited rituximab infusion.     He received Rituximab infusion around 2200 on 1/5/2024 along with meythlprednisone, acetaminophen, and benadryl prior. He tolerated the infusion well and did not have any complaints for the team on 1/6/2024. He is stable for discharge home with close outpatient follow up.     Of note, patient's potassium slightly elevated 1/5/2024 at 5.4. Spoke with the transplant nephrology team, and they recommended lokelma 5g once daily for a week, continuing to hold losartan, continuing low potassium diet, and follow up with [facility] and outpatient nephrology appointment on 1/8/2024.    Pertinent Physical Exam At Time of Discharge  Physical Exam  Constitutional:       Appearance: Normal appearance.   HENT:      Head: Normocephalic and atraumatic.   Cardiovascular:      Rate and Rhythm: Normal rate and regular rhythm.      Pulses: Normal pulses.      Heart sounds: Normal heart sounds.   Pulmonary:      Effort: No respiratory distress.   Abdominal:       General: Bowel sounds are normal.      Palpations: Abdomen is soft.   Skin:     General: Skin is warm and dry.   Neurological:      Mental Status: He is alert and oriented to person, place, and time.     Home Medications     Medication List      START taking these medications     sodium zirconium cyclosilicate 5 gram packet; Commonly known as:   Lokelma; Take 5 g by mouth once every 24 hours.     CONTINUE taking these medications     amLODIPine 10 mg tablet; Commonly known as: Norvasc   azaTHIOprine 50 mg tablet; Commonly known as: Imuran; Take 1 tablet (50   mg) by mouth once daily. Do not start before December 22, 2023.   calcitriol 0.25 mcg capsule; Commonly known as: Rocaltrol; Take 1   capsule (0.25 mcg) by mouth once daily. Do not start before December 22, 2023.   carvedilol 12.5 mg tablet; Commonly known as: Coreg; Take 3 tablets   (37.5 mg) by mouth 2 times a day.   cholecalciferol 25 MCG (1000 UT) tablet; Commonly known as: Vitamin D-3;   Take 1 tablet (1,000 Units) by mouth once daily. Do not start before   December 22, 2023.   cinacalcet 30 mg tablet; Commonly known as: Sensipar; Take 1 tablet (30   mg) by mouth once daily. Take with food or shortly afer a meal. Swallow   tablet whole; do not break or divide. Do not start before December 22, 2023.   clotrimazole 10 mg dariel; Commonly known as: Mycelex; Take 1 tablet (10   mg) by mouth 3 times a day.   docusate sodium 100 mg capsule; Commonly known as: Colace; Take 1   capsule (100 mg) by mouth once daily. Do not start before December 22, 2023.   FeroSuL tablet; Generic drug: ferrous sulfate (325 mg ferrous sulfate);   Take 1 tablet by mouth 2 times a day.   furosemide 40 mg tablet; Commonly known as: Lasix; Take 1 tablet (40 mg)   by mouth 2 times a day.   hydrALAZINE 100 mg tablet; Commonly known as: Apresoline; Take 1 tablet   (100 mg) by mouth 3 times a day.   insulin lispro 100 unit/mL injection; Commonly known as: HumaLOG   isosorbide  "mononitrate ER 60 mg 24 hr tablet; Commonly known as: Imdur;   Take 1 tablet (60 mg) by mouth once daily. Do not crush or chew. Do not   start before December 22, 2023.   Lantus U-100 Insulin 100 unit/mL injection; Generic drug: insulin   glargine   * TechLITE Pen Needle 32 gauge x 5/32\" needle; Generic drug: pen needle,   diabetic   * pen needle, diabetic 32 gauge x 5/32\" needle; Commonly known as:   TechLITE Pen Needle; 1 each 3 times a day.   polyethylene glycol 17 gram packet; Commonly known as: Glycolax,   Miralax; Take 17 g by mouth once daily. Do not start before December 22, 2023.   pravastatin 10 mg tablet; Commonly known as: Pravachol; Take 1 tablet   (10 mg) by mouth once daily at bedtime.   predniSONE 5 mg tablet; Commonly known as: Deltasone; Take 2 tablets (10   mg) by mouth once daily in the morning.   sulfamethoxazole-trimethoprim 400-80 mg tablet; Commonly known as:   Bactrim; Take 1 tablet by mouth 2 times a day.   * tacrolimus 0.1 % ointment; Commonly known as: Protopic; Apply   topically 2 times a day.   * Prograf 0.5 mg capsule; Take 2.5 mg by mouth every 12 hours.   * Unilet Super Thin Lancets 30 gauge misc; Generic drug: lancets; USE TO   TEST BLOOD SUGAR THREE TIMES A DAY   * Unilet Super Thin Lancets 30 gauge misc; Generic drug: lancets; 1 each   3 times a day.  * This list has 6 medication(s) that are the same as other medications   prescribed for you. Read the directions carefully, and ask your doctor or   other care provider to review them with you.     STOP taking these medications     losartan 25 mg tablet; Commonly known as: Cozaar       Outpatient Follow-Up  Future Appointments   Date Time Provider Department [place]   1/8/2024  9:00 AM [facility]ID[id number]   1/9/2024  2:00 PM Sumeet Bacon MD [id number] [facility]   1/15/2024 11:45 AM Daxa Cote DPM [id number] [facility]   1/18/2024  2:45 PM Kurtis Jc MD [id number] [facility]   1/24/2024  4:00 PM Elma" JULIANNA Hutton, APRN-CNP, DNP [id number] [facility]   2/28/2024 10:00 AM Estella Quinonez MD [id number] [facility]   3/27/2024 10:30 AM Elías Penn, APRN-CNP [id number] [facility]   4/16/2024 10:20 AM TXP KIDNEY PROVIDER [id number] [facility]   10/16/2024 10:30 AM Vinicius Araiza MD [id number] Academic       Celestine Cramer MD

## 2024-01-06 NOTE — DISCHARGE INSTRUCTIONS
Dear Mr. Bashir,    You were admitted to the hospital on 1/5/2024 for an expedited rituximab infusion. This medication is to help prevent additional damage to your transplanted kidney. While here, we spoke with Dr. Arroyo, one of our nephrologists, and she helped guide our treatment plan. We continued you on your home medications while you were here and you received the rituximab infusion in the late evening on 1/5/2024. Of note, your potassium level was a little bit high (5.4) on the morning of 1/6/2024. We recommend stopping your losartan for now, and it can be restarted by your outpatient doctors. We also recommend you continue on a low potassium diet. We would also like for you to have blood draw after you leave the hospital to keep an eye on your kidneys. This test is called a renal function panel and you can get this done at the [facility]'s lab on Monday. Please note the following changes to your medication regimen and outpatient doctor's appointments.    STOP Losartan (Cozaar) 25 mg once daily  START Lokelma 5 g once a day for one week    Appointment with Transplant Nephrology on 1/8/2024  Renal Function Panel lab test on Monday 1/8/2024  You have several other Doctor's appointments coming up and they are listed above in your after visit summary.    It was a pleasure taking care of you!  The [facility] Team

## 2024-01-06 NOTE — PROGRESS NOTES
1/6/24 1110 Transitional Care Coordinator Notes:    Spoke with patient regarding discharge needs. Patient is up independent and has family support. Denies any home care needs. Denies any financial or social work needs. Patient needs to establish care with a PCP, will notify team. Family will provide transportation at discharge.                    Assessment/Plan   Principal Problem:    Immunosuppressive management encounter following kidney transplant  Active Problems:    Kidney transplanted    Discharge Plans: discharge to home           Kristen Omalley RN

## 2024-01-06 NOTE — CARE PLAN
The patient's goals for the shift include comfort and safety    The clinical goals for the shift include pt will not complain of pain throughout the shift, bp will be controlled    Over the shift, the patient met all goals

## 2024-01-06 NOTE — PROGRESS NOTES
Transplant Nephrology progress note     Date of admission: 1/5/2024     Manolo Bashir is a 68 y.o.  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  Planning to get discharged today    PROBLEM LIST:  Principal Problem:    Immunosuppressive management encounter following kidney transplant  Active Problems:    Kidney transplanted         ALLERGIES:  No Known Allergies         CURRENT MEDICATIONS:  Scheduled medications  acetaminophen, 650 mg, oral, Once  amLODIPine, 10 mg, oral, Daily  azaTHIOprine, 50 mg, oral, Daily  calcitriol, 0.25 mcg, oral, Daily  carvedilol, 37.5 mg, oral, BID  cholecalciferol, 1,000 Units, oral, Daily  cinacalcet, 30 mg, oral, Daily  clotrimazole, 10 mg, oral, TID  docusate sodium, 100 mg, oral, Daily  ferrous sulfate (325 mg ferrous sulfate), 65 mg of iron, oral, BID  furosemide, 40 mg, oral, q12h ZOË  heparin (porcine), 5,000 Units, subcutaneous, q8h [facility]  hydrALAZINE, 100 mg, oral, TID  insulin glargine, 20 Units, subcutaneous, Daily  isosorbide mononitrate ER, 60 mg, oral, Daily  lidocaine, 1 patch, transdermal, Daily  melatonin, 5 mg, oral, Nightly  polyethylene glycol, 17 g, oral, Daily  pravastatin, 10 mg,  "oral, Nightly  predniSONE, 10 mg, oral, q AM  sodium zirconium cyclosilicate, 5 g, oral, q24h  sulfamethoxazole-trimethoprim, 1 tablet, oral, BID  tacrolimus, 2.5 mg, oral, q12h ZOË  tacrolimus, , Topical, BID      Continuous medications     PRN medications  PRN medications: acetaminophen, dextrose 10 % in water (D10W), dextrose, glucagon       OBJECTIVE:    VITALS: Visit Vitals  /77   Pulse 69   Temp 37.1 °C (98.8 °F)   Resp 19   Ht 1.727 m (5' 8\")   Wt 82.5 kg (181 lb 12.8 oz)   SpO2 99%   BMI 27.64 kg/m²   Smoking Status Never   BSA 1.99 m²        General: No distress   Mucosa moist   AI, AC, AF     HEENT: PEERLA  CVS: S1 S2 no murmurs  RESP:  Lungs clear to auscultation   ABDO: Soft, non-tender   Neuro: A + O x 3  Skin: No rash   Extremities: No edema       LABS:  Results from last 72 hours   Lab Units 01/06/24  0718   WBC AUTO x10*3/uL 3.9*   HEMOGLOBIN g/dL 9.2*   MCV fL 88   PLATELETS AUTO x10*3/uL 113*   BUN mg/dL 50*   CREATININE mg/dL 3.93*   CALCIUM mg/dL 8.9            Intake/Output Summary (Last 24 hours) at 1/6/2024 1418  Last data filed at 1/6/2024 0640  Gross per 24 hour   Intake --   Output 325 ml   Net -325 ml          ASSESSMENT AND PLAN:    Manolo Bashir is a 68 y.o.M with PMH ESRD (on HD [id number]), s/p 2x renal transplants (1992, 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  Currently admitted for is a second infusion of rituximab.    Allograft function:  -Noticed to have a gradual uptrend in the " creatinine.  Patient have mild hyperkalemia for which she will recommended to stop losartan and add Lokelma.  -Completed his rituximab infusion without any reactions.  -No signs of volume overload.  -Will continue to monitor outpatient.      Immunosuppression: Continue with the current dose of tacrolimus, aim levels are 5-8, prednisone 5 mg, azathioprine.    Anemia/leukopenia: So far infectious workup was negative and bone marrow biopsy is inconclusive for myeloma.    Bone mineral disease continue with the calcitriol and can discontinue Cinacalcet.    Hemodynamics: Continue with amlodipine, carvedilol, Lasix twice daily, hydralazine, Imdur.  Recommended to hold losartan in the setting of hyperkalemia.    Thank you for consulting .  Dina Benoit MD

## 2024-01-06 NOTE — NURSING NOTE
Discharge Note:    Pt IV removed intact. Reviewed discharge instructions with patient, patient verbalized understanding with no further questions. Meds to beds brought patient medications and reviewed all prescription information. Pt has ride coming to pick him up. All belongs returned to pt, A&O x4 at time of discharge.

## 2024-01-06 NOTE — CARE PLAN
The patient's goals for the shift include comfort and safety    The clinical goals for the shift include get iv infusion      Problem: Fall/Injury  Goal: Not fall by end of shift  Outcome: Progressing  Goal: Be free from injury by end of the shift  Outcome: Progressing  Goal: Verbalize understanding of personal risk factors for fall in the hospital  Outcome: Progressing  Goal: Verbalize understanding of risk factor reduction measures to prevent injury from fall in the home  Outcome: Progressing  Goal: Use assistive devices by end of the shift  Outcome: Progressing  Goal: Pace activities to prevent fatigue by end of the shift  Outcome: Progressing

## 2024-01-08 ENCOUNTER — HOSPITAL ENCOUNTER (EMERGENCY)
Facility: HOSPITAL | Age: 69
Discharge: HOME | End: 2024-01-08
Attending: STUDENT IN AN ORGANIZED HEALTH CARE EDUCATION/TRAINING PROGRAM
Payer: COMMERCIAL

## 2024-01-08 ENCOUNTER — CLINICAL SUPPORT (OUTPATIENT)
Dept: EMERGENCY MEDICINE | Facility: HOSPITAL | Age: 69
End: 2024-01-08
Payer: COMMERCIAL

## 2024-01-08 ENCOUNTER — OFFICE VISIT (OUTPATIENT)
Dept: TRANSPLANT | Facility: HOSPITAL | Age: 69
End: 2024-01-08
Payer: COMMERCIAL

## 2024-01-08 VITALS
TEMPERATURE: 97.1 F | SYSTOLIC BLOOD PRESSURE: 192 MMHG | OXYGEN SATURATION: 100 % | WEIGHT: 189.3 LBS | DIASTOLIC BLOOD PRESSURE: 101 MMHG | HEART RATE: 76 BPM | BODY MASS INDEX: 28.78 KG/M2

## 2024-01-08 VITALS
DIASTOLIC BLOOD PRESSURE: 85 MMHG | SYSTOLIC BLOOD PRESSURE: 157 MMHG | HEART RATE: 67 BPM | OXYGEN SATURATION: 100 % | RESPIRATION RATE: 16 BRPM | TEMPERATURE: 96.8 F

## 2024-01-08 DIAGNOSIS — N05.8 IMMUNE-COMPLEX GLOMERULONEPHRITIS: ICD-10-CM

## 2024-01-08 DIAGNOSIS — I10 HYPERTENSION, UNSPECIFIED TYPE: Primary | ICD-10-CM

## 2024-01-08 DIAGNOSIS — Z48.298 AFTERCARE FOLLOWING ORGAN TRANSPLANT: ICD-10-CM

## 2024-01-08 DIAGNOSIS — I15.9 SECONDARY HYPERTENSION: ICD-10-CM

## 2024-01-08 DIAGNOSIS — Z94.0 RENAL TRANSPLANT RECIPIENT (HHS-HCC): ICD-10-CM

## 2024-01-08 DIAGNOSIS — E87.5 HYPERKALEMIA: ICD-10-CM

## 2024-01-08 DIAGNOSIS — Z94.0 KIDNEY REPLACED BY TRANSPLANT (HHS-HCC): Primary | ICD-10-CM

## 2024-01-08 LAB
ALBUMIN SERPL BCP-MCNC: 2.9 G/DL (ref 3.4–5)
ALP SERPL-CCNC: 50 U/L (ref 33–136)
ALT SERPL W P-5'-P-CCNC: 14 U/L (ref 10–52)
ANION GAP BLDV CALCULATED.4IONS-SCNC: 9 MMOL/L (ref 10–25)
ANION GAP SERPL CALC-SCNC: 9 MMOL/L (ref 10–20)
APPEARANCE UR: CLEAR
AST SERPL W P-5'-P-CCNC: 18 U/L (ref 9–39)
BASE EXCESS BLDV CALC-SCNC: 0.4 MMOL/L (ref -2–3)
BASOPHILS # BLD AUTO: 0.02 X10*3/UL (ref 0–0.1)
BASOPHILS NFR BLD AUTO: 0.5 %
BILIRUB SERPL-MCNC: 0.3 MG/DL (ref 0–1.2)
BILIRUB UR STRIP.AUTO-MCNC: NEGATIVE MG/DL
BODY TEMPERATURE: 37 DEGREES CELSIUS
BUN SERPL-MCNC: 53 MG/DL (ref 6–23)
CA-I BLDV-SCNC: 1.38 MMOL/L (ref 1.1–1.33)
CALCIUM SERPL-MCNC: 9.1 MG/DL (ref 8.6–10.6)
CARDIAC TROPONIN I PNL SERPL HS: 44 NG/L (ref 0–53)
CHLORIDE BLDV-SCNC: 106 MMOL/L (ref 98–107)
CHLORIDE SERPL-SCNC: 108 MMOL/L (ref 98–107)
CO2 SERPL-SCNC: 27 MMOL/L (ref 21–32)
COLOR UR: YELLOW
CREAT SERPL-MCNC: 4.23 MG/DL (ref 0.5–1.3)
EGFRCR SERPLBLD CKD-EPI 2021: 15 ML/MIN/1.73M*2
EOSINOPHIL # BLD AUTO: 0.06 X10*3/UL (ref 0–0.7)
EOSINOPHIL NFR BLD AUTO: 1.5 %
ERYTHROCYTE [DISTWIDTH] IN BLOOD BY AUTOMATED COUNT: 13.8 % (ref 11.5–14.5)
GLUCOSE BLD MANUAL STRIP-MCNC: 84 MG/DL (ref 74–99)
GLUCOSE BLDV-MCNC: 78 MG/DL (ref 74–99)
GLUCOSE SERPL-MCNC: 72 MG/DL (ref 74–99)
GLUCOSE UR STRIP.AUTO-MCNC: NEGATIVE MG/DL
HCO3 BLDV-SCNC: 27.3 MMOL/L (ref 22–26)
HCT VFR BLD AUTO: 34.9 % (ref 41–52)
HCT VFR BLD EST: 36 % (ref 41–52)
HGB BLD-MCNC: 12 G/DL (ref 13.5–17.5)
HGB BLDV-MCNC: 11.9 G/DL (ref 13.5–17.5)
IMM GRANULOCYTES # BLD AUTO: 0.08 X10*3/UL (ref 0–0.7)
IMM GRANULOCYTES NFR BLD AUTO: 1.9 % (ref 0–0.9)
INHALED O2 CONCENTRATION: 21 %
KETONES UR STRIP.AUTO-MCNC: NEGATIVE MG/DL
LACTATE BLDV-SCNC: 0.5 MMOL/L (ref 0.4–2)
LACTATE SERPL-SCNC: 0.5 MMOL/L (ref 0.4–2)
LEUKOCYTE ESTERASE UR QL STRIP.AUTO: NEGATIVE
LYMPHOCYTES # BLD AUTO: 0.94 X10*3/UL (ref 1.2–4.8)
LYMPHOCYTES NFR BLD AUTO: 22.8 %
MAGNESIUM SERPL-MCNC: 1.82 MG/DL (ref 1.6–2.4)
MCH RBC QN AUTO: 29.4 PG (ref 26–34)
MCHC RBC AUTO-ENTMCNC: 34.4 G/DL (ref 32–36)
MCV RBC AUTO: 86 FL (ref 80–100)
MONOCYTES # BLD AUTO: 0.36 X10*3/UL (ref 0.1–1)
MONOCYTES NFR BLD AUTO: 8.7 %
MUCOUS THREADS #/AREA URNS AUTO: NORMAL /LPF
NEUTROPHILS # BLD AUTO: 2.66 X10*3/UL (ref 1.2–7.7)
NEUTROPHILS NFR BLD AUTO: 64.6 %
NITRITE UR QL STRIP.AUTO: NEGATIVE
NRBC BLD-RTO: 0 /100 WBCS (ref 0–0)
OXYHGB MFR BLDV: 60.1 % (ref 45–75)
PCO2 BLDV: 53 MM HG (ref 41–51)
PH BLDV: 7.32 PH (ref 7.33–7.43)
PH UR STRIP.AUTO: 6 [PH]
PHOSPHATE SERPL-MCNC: 3.3 MG/DL (ref 2.5–4.9)
PLATELET # BLD AUTO: 103 X10*3/UL (ref 150–450)
PO2 BLDV: 40 MM HG (ref 35–45)
POTASSIUM BLDV-SCNC: 4.9 MMOL/L (ref 3.5–5.3)
POTASSIUM SERPL-SCNC: 4.8 MMOL/L (ref 3.5–5.3)
PROT SERPL-MCNC: 5.5 G/DL (ref 6.4–8.2)
PROT UR STRIP.AUTO-MCNC: ABNORMAL MG/DL
RBC # BLD AUTO: 4.08 X10*6/UL (ref 4.5–5.9)
RBC # UR STRIP.AUTO: NEGATIVE /UL
RBC #/AREA URNS AUTO: NORMAL /HPF
SAO2 % BLDV: 62 % (ref 45–75)
SODIUM BLDV-SCNC: 137 MMOL/L (ref 136–145)
SODIUM SERPL-SCNC: 139 MMOL/L (ref 136–145)
SP GR UR STRIP.AUTO: 1.02
SQUAMOUS #/AREA URNS AUTO: NORMAL /HPF
UROBILINOGEN UR STRIP.AUTO-MCNC: <2 MG/DL
WBC # BLD AUTO: 4.1 X10*3/UL (ref 4.4–11.3)
WBC #/AREA URNS AUTO: NORMAL /HPF

## 2024-01-08 PROCEDURE — 87800 DETECT AGNT MULT DNA DIREC: CPT | Performed by: STUDENT IN AN ORGANIZED HEALTH CARE EDUCATION/TRAINING PROGRAM

## 2024-01-08 PROCEDURE — 85025 COMPLETE CBC W/AUTO DIFF WBC: CPT | Performed by: STUDENT IN AN ORGANIZED HEALTH CARE EDUCATION/TRAINING PROGRAM

## 2024-01-08 PROCEDURE — 96361 HYDRATE IV INFUSION ADD-ON: CPT

## 2024-01-08 PROCEDURE — 93005 ELECTROCARDIOGRAM TRACING: CPT

## 2024-01-08 PROCEDURE — 81001 URINALYSIS AUTO W/SCOPE: CPT | Performed by: STUDENT IN AN ORGANIZED HEALTH CARE EDUCATION/TRAINING PROGRAM

## 2024-01-08 PROCEDURE — 99284 EMERGENCY DEPT VISIT MOD MDM: CPT | Performed by: STUDENT IN AN ORGANIZED HEALTH CARE EDUCATION/TRAINING PROGRAM

## 2024-01-08 PROCEDURE — 87661 TRICHOMONAS VAGINALIS AMPLIF: CPT | Mod: 59 | Performed by: STUDENT IN AN ORGANIZED HEALTH CARE EDUCATION/TRAINING PROGRAM

## 2024-01-08 PROCEDURE — 3008F BODY MASS INDEX DOCD: CPT | Performed by: INTERNAL MEDICINE

## 2024-01-08 PROCEDURE — 99285 EMERGENCY DEPT VISIT HI MDM: CPT | Performed by: STUDENT IN AN ORGANIZED HEALTH CARE EDUCATION/TRAINING PROGRAM

## 2024-01-08 PROCEDURE — 84132 ASSAY OF SERUM POTASSIUM: CPT | Performed by: STUDENT IN AN ORGANIZED HEALTH CARE EDUCATION/TRAINING PROGRAM

## 2024-01-08 PROCEDURE — 84484 ASSAY OF TROPONIN QUANT: CPT | Performed by: STUDENT IN AN ORGANIZED HEALTH CARE EDUCATION/TRAINING PROGRAM

## 2024-01-08 PROCEDURE — 36415 COLL VENOUS BLD VENIPUNCTURE: CPT | Performed by: STUDENT IN AN ORGANIZED HEALTH CARE EDUCATION/TRAINING PROGRAM

## 2024-01-08 PROCEDURE — 3077F SYST BP >= 140 MM HG: CPT | Performed by: INTERNAL MEDICINE

## 2024-01-08 PROCEDURE — 1159F MED LIST DOCD IN RCRD: CPT | Performed by: INTERNAL MEDICINE

## 2024-01-08 PROCEDURE — 1036F TOBACCO NON-USER: CPT | Performed by: INTERNAL MEDICINE

## 2024-01-08 PROCEDURE — 84100 ASSAY OF PHOSPHORUS: CPT | Performed by: STUDENT IN AN ORGANIZED HEALTH CARE EDUCATION/TRAINING PROGRAM

## 2024-01-08 PROCEDURE — 1126F AMNT PAIN NOTED NONE PRSNT: CPT | Performed by: INTERNAL MEDICINE

## 2024-01-08 PROCEDURE — 83605 ASSAY OF LACTIC ACID: CPT | Performed by: STUDENT IN AN ORGANIZED HEALTH CARE EDUCATION/TRAINING PROGRAM

## 2024-01-08 PROCEDURE — 83735 ASSAY OF MAGNESIUM: CPT | Performed by: STUDENT IN AN ORGANIZED HEALTH CARE EDUCATION/TRAINING PROGRAM

## 2024-01-08 PROCEDURE — 3080F DIAST BP >= 90 MM HG: CPT | Performed by: INTERNAL MEDICINE

## 2024-01-08 PROCEDURE — 99215 OFFICE O/P EST HI 40 MIN: CPT | Mod: 25

## 2024-01-08 PROCEDURE — 2500000004 HC RX 250 GENERAL PHARMACY W/ HCPCS (ALT 636 FOR OP/ED): Mod: SE | Performed by: STUDENT IN AN ORGANIZED HEALTH CARE EDUCATION/TRAINING PROGRAM

## 2024-01-08 PROCEDURE — 96374 THER/PROPH/DIAG INJ IV PUSH: CPT

## 2024-01-08 PROCEDURE — 2500000001 HC RX 250 WO HCPCS SELF ADMINISTERED DRUGS (ALT 637 FOR MEDICARE OP): Mod: SE | Performed by: STUDENT IN AN ORGANIZED HEALTH CARE EDUCATION/TRAINING PROGRAM

## 2024-01-08 PROCEDURE — 99215 OFFICE O/P EST HI 40 MIN: CPT

## 2024-01-08 PROCEDURE — 80053 COMPREHEN METABOLIC PANEL: CPT | Performed by: STUDENT IN AN ORGANIZED HEALTH CARE EDUCATION/TRAINING PROGRAM

## 2024-01-08 PROCEDURE — 82947 ASSAY GLUCOSE BLOOD QUANT: CPT | Mod: 59

## 2024-01-08 PROCEDURE — 1111F DSCHRG MED/CURRENT MED MERGE: CPT | Performed by: INTERNAL MEDICINE

## 2024-01-08 PROCEDURE — 3066F NEPHROPATHY DOC TX: CPT | Performed by: INTERNAL MEDICINE

## 2024-01-08 RX ORDER — HYDROMORPHONE HYDROCHLORIDE 1 MG/ML
0.5 INJECTION, SOLUTION INTRAMUSCULAR; INTRAVENOUS; SUBCUTANEOUS ONCE
Status: COMPLETED | OUTPATIENT
Start: 2024-01-08 | End: 2024-01-08

## 2024-01-08 RX ORDER — TACROLIMUS 0.5 MG/1
2.5 CAPSULE ORAL ONCE
Status: COMPLETED | OUTPATIENT
Start: 2024-01-08 | End: 2024-01-08

## 2024-01-08 RX ORDER — ISOSORBIDE MONONITRATE 30 MG/1
60 TABLET, EXTENDED RELEASE ORAL ONCE
Status: COMPLETED | OUTPATIENT
Start: 2024-01-08 | End: 2024-01-08

## 2024-01-08 RX ORDER — HYDRALAZINE HYDROCHLORIDE 25 MG/1
100 TABLET, FILM COATED ORAL ONCE
Status: COMPLETED | OUTPATIENT
Start: 2024-01-08 | End: 2024-01-08

## 2024-01-08 RX ORDER — CARVEDILOL 12.5 MG/1
37.5 TABLET ORAL 2 TIMES DAILY
Qty: 180 TABLET | Refills: 0 | Status: SHIPPED | OUTPATIENT
Start: 2024-01-08 | End: 2024-01-21 | Stop reason: HOSPADM

## 2024-01-08 RX ORDER — AMLODIPINE BESYLATE 5 MG/1
10 TABLET ORAL DAILY
Status: DISCONTINUED | OUTPATIENT
Start: 2024-01-08 | End: 2024-01-08 | Stop reason: HOSPADM

## 2024-01-08 RX ORDER — PREDNISONE 10 MG/1
10 TABLET ORAL ONCE
Status: COMPLETED | OUTPATIENT
Start: 2024-01-08 | End: 2024-01-08

## 2024-01-08 RX ORDER — CARVEDILOL 12.5 MG/1
12.5 TABLET ORAL 2 TIMES DAILY
Status: DISCONTINUED | OUTPATIENT
Start: 2024-01-08 | End: 2024-01-08 | Stop reason: HOSPADM

## 2024-01-08 RX ADMIN — AMLODIPINE BESYLATE 10 MG: 5 TABLET ORAL at 10:38

## 2024-01-08 RX ADMIN — HYDRALAZINE HYDROCHLORIDE 100 MG: 25 TABLET, FILM COATED ORAL at 10:38

## 2024-01-08 RX ADMIN — TACROLIMUS 2.5 MG: 0.5 CAPSULE ORAL at 10:57

## 2024-01-08 RX ADMIN — ISOSORBIDE MONONITRATE 60 MG: 30 TABLET, EXTENDED RELEASE ORAL at 10:38

## 2024-01-08 RX ADMIN — PREDNISONE 10 MG: 10 TABLET ORAL at 10:38

## 2024-01-08 RX ADMIN — SODIUM CHLORIDE, POTASSIUM CHLORIDE, SODIUM LACTATE AND CALCIUM CHLORIDE 500 ML: 600; 310; 30; 20 INJECTION, SOLUTION INTRAVENOUS at 11:14

## 2024-01-08 RX ADMIN — HYDROMORPHONE HYDROCHLORIDE 0.5 MG: 1 INJECTION, SOLUTION INTRAMUSCULAR; INTRAVENOUS; SUBCUTANEOUS at 10:57

## 2024-01-08 RX ADMIN — CARVEDILOL 12.5 MG: 12.5 TABLET, FILM COATED ORAL at 10:38

## 2024-01-08 ASSESSMENT — PAIN SCALES - GENERAL: PAINLEVEL: 0-NO PAIN

## 2024-01-08 NOTE — ED PROVIDER NOTES
HPI:  Patient is a 68-year-old male with history of ESRD (status post renal transplant in 1992 and 2013), CKD stage III, MGUS, type 2 diabetes, hypertension, prostate cancer status postresection, chronic urinary incontinence who presents with concerns for hypertension.  Patient did not take his antihypertensives today secondary to fasting Glucose and other labs this morning.  He was directed to the ED by his nephrologist given concerns for hypertensive emergency.  Patient reports acute on chronic diffuse back pain, worse on the left flank which has been ongoing for 3 months.  He additionally reports dysuria and penile discharge.  No urinary urgency/frequency.  Last sexual intercourse was approximately 6 months ago with his ex girlfriend and they did not wear barrier contraception.  He denies fever or chills.  No nausea, vomiting, diarrhea or abdominal pain.  Patient states he has been compliant with all of his home medications.    ROS: A 10-system ROS was performed and was negative except as documented in the HPI.    PMH/PSH: Reviewed in EMR. As above in HPI.  SH: Denies EtOH, tobacco or illicit drug use.  Allergies: No Known Allergies   Medications: See prescription writer for full medication list.     General: no acute distress, appropriate conversation  HEENT:  No rhinorrhea. MMM.  Cardiac: regular rate rhythm, no murmurs  Pulm:  normal respiratory effort on room air, equal chest expansion, clear bilaterally, no wheeze or crackles  GI: soft, nontender, nondistended, +BS  Back: bilateral flank TTP, no suprapubic tenderness to palpation  Extremities:  moves all extremities freely, no edema noted  Skin: warm, well-perfused, no lesions noted on exposed skin.  Neuro:  AOx3, moves all 4 extremities freely and independently     Assessment/Plan/MDM  Patient is a 68-year-old male with history of ESRD (status post renal transplant in 1992 and 2013), CKD stage III, MGUS, type 2 diabetes, hypertension, prostate cancer status  postresection, chronic urinary incontinence who presents with concerns for hypertension.  Patient with mild respiratory acidosis.  He does have pancytopenia, no transfusion indicated.  UA without evidence of UTI.  Magnesium and phosphorus within normal limits.  Initial troponin negative.  Gonorrhea/chlamydia/trichomonas urine testing pending.  Patient does have an ISIAH and given 500 cc bolus.  Discussed with transplant nephrology who is comfortable with discharge home and close outpatient follow-up given blood pressure control in the ED.  Patient discharged in a stable condition, advised to continue taking his medications as prescribed.    EKG shows normal sinus rhythm, rate 77, left axis deviation, widened QRS, no ST elevation, poor R wave progression unchanged from December 2023    ED Course/Progress:    Diagnoses as of 01/09/24 0731   Hypertension, unspecified type   Renal transplant recipient        Clinical Impression: as above  Dispo:   Home: I discussed the differential, results and discharge plan with the patient.  I emphasized the importance of follow-up with transplant nephrology this week.  I explained reasons for the patient to return to the Emergency Department.  Questions were addressed.  They understand return precautions and discharge instructions. The patient expressed understanding and agreement with assessment/plan.     Pt seen and discussed with attending physician, Dr. Sean Torres MD  PGY3, Emergency Medicine    Disclaimer: This note was dictated by speech recognition. An attempt at proof reading was made to minimize errors. Errors in transcription may be present.  Please call if questions.      Madelyn Torres MD  Resident  01/09/24 0381

## 2024-01-08 NOTE — PROGRESS NOTES
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      Pt has had 2 admission recently for ISIAH on CKD. hypervolemia.  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    Pt was treated with ritux 1gm x2 doses. Tolerated infusions well.     Serum Cr remains gradually worsening, 3-3.5 in 11/2023. Recent serum Cr up to 3.8-4 for which ARB was held.    Also with mild hyperkalemia. ARB held, started on Lokelma 5 g/d.     BP today elevated 200-100s. Pt with c/o lower back pain, chronic pain that is worse today. Has not taken his BP meds yet.     Home Bps usually 150-160s/80-90s per pt.     Wt and LE swelling trending down. On Lasix 40 mg bid.     Also c/o pain in LLQ over allograft which is new. No LUTS reported.    ROS:  Review of  14 systems was performed system by system. See HPI. Otherwise, the symptoms were negative.    PAST MEDICAL HISTORY:  Past Medical History:   Diagnosis Date    Chronic kidney disease, stage 3 unspecified (CMS/HCC) 09/26/2018    Stage 3 chronic kidney disease    COVID-19 06/18/2020    COVID-19 virus infection    Diabetes (CMS/HCC)     Focal and segmental proliferative glomerulonephritis 12/23/2023    HTN (hypertension)     Other long  term (current) drug therapy 07/20/2021    High risk medication use    Personal history of other diseases of the circulatory system     Personal history of cardiac murmur    Personal history of other infectious and parasitic diseases 08/17/2015    History of hepatitis    Polyp, colonic 08/17/2023    Primary osteoarthritis of both ankles 08/17/2023    Tubular adenoma of colon 08/17/2023    Unspecified kidney failure 08/17/2016    Renal failure        PAST SURGICAL HISTORY:  Past Surgical History:   Procedure Laterality Date    ILEOSTOMY  04/25/2017    Ileostomy    ILEOSTOMY CLOSURE  08/17/2015    Ileostomy Closure    OTHER SURGICAL HISTORY  04/21/2017    Right Hemicolectomy    OTHER SURGICAL HISTORY  08/17/2015    Arteriovenous Surgery Creation Of A-V Fistula    OTHER SURGICAL HISTORY  08/17/2015    Sigmoidoscopy (Fiberoptic, Therapeutic )    PROSTATECTOMY  10/11/2013    Prostatectomy Radical    TRANSPLANT, KIDNEY, OPEN  1992    TRANSPLANT, KIDNEY, OPEN  2013    US GUIDED PERCUTANEOUS BIOPSY RENAL LEFT Left 11/20/2023    US GUIDED PERCUTANEOUS BIOPSY RENAL LEFT 11/20/2023 Lou Rodgers MD [facility]    US GUIDED PERCUTANEOUS PERITONEAL OR RETROPERITONEAL FLUID COLLECTION DRAINAGE  10/20/2022    US GUIDED PERCUTANEOUS PERITONEAL OR RETROPERITONEAL FLUID COLLECTION DRAINAGE 10/20/2022 [facility] CLINICAL LEGACY        SOCIAL HISTORY:  Social History     Socioeconomic History    Marital status: Single     Spouse name: Not on file    Number of children: Not on file    Years of education: Not on file    Highest education level: Not on file   Occupational History    Not on file   Tobacco Use    Smoking status: Never    Smokeless tobacco: Never   Vaping Use    Vaping Use: Never used   Substance and Sexual Activity    Alcohol use: Yes    Drug use: Yes     Types: Oxycodone    Sexual activity: Not on file   Other Topics Concern    Not on file   Social History Narrative    Not on file     Social Determinants of Health     Financial  Resource Strain: Low Risk  (1/5/2024)    Overall Financial Resource Strain (CARDIA)     Difficulty of Paying Living Expenses: Not hard at all   Food Insecurity: No Food Insecurity (10/3/2023)    Hunger Vital Sign     Worried About Running Out of Food in the Last Year: Never true     Ran Out of Food in the Last Year: Never true   Transportation Needs: No Transportation Needs (1/5/2024)    PRAPARE - Transportation     Lack of Transportation (Medical): No     Lack of Transportation (Non-Medical): No   Physical Activity: Unknown (10/3/2023)    Exercise Vital Sign     Days of Exercise per Week: 2 days     Minutes of Exercise per Session: Patient declined   Recent Concern: Physical Activity - Insufficiently Active (10/3/2023)    Exercise Vital Sign     Days of Exercise per Week: 2 days     Minutes of Exercise per Session: 30 min   Stress: No Stress Concern Present (10/3/2023)    Citizen of [place] of Occupational Health - Occupational Stress Questionnaire     Feeling of Stress : Not at all   Social Connections: Unknown (10/3/2023)    Social Connection and Isolation Panel [NHANES]     Frequency of Communication with Friends and Family: More than three times a week     Frequency of Social Gatherings with Friends and Family: Twice a week     Attends [employer] Services: Patient declined     Active Member of Clubs or Organizations: Patient declined     Attends Club or Organization Meetings: Patient declined     Marital Status: Never    Intimate Partner Violence: Not At Risk (10/3/2023)    Humiliation, Afraid, Rape, and Kick questionnaire     Fear of Current or Ex-Partner: No     Emotionally Abused: No     Physically Abused: No     Sexually Abused: No   Housing Stability: Low Risk  (1/5/2024)    Housing Stability Vital Sign     Unable to Pay for Housing in the Last Year: No     Number of Places Lived in the Last Year: 1     Unstable Housing in the Last Year: No       FAMILY HISTORY:  Family History   Problem Relation Name  "Age of Onset    Bone cancer Mother      Other (corona's sarcome of the bone marrow) Mother      Prostate cancer Father      Diabetes Other Family Hist     Hypertension Other Family Hist        MEDICATION LIST:  Current Outpatient Medications   Medication Instructions    amLODIPine (Norvasc) 10 mg tablet Take 1 tablet (10 mg) by mouth once daily.    azaTHIOprine (IMURAN) 50 mg, oral, Daily    calcitriol (ROCALTROL) 0.25 mcg, oral, Daily    carvedilol (COREG) 37.5 mg, oral, 2 times daily    cholecalciferol (VITAMIN D-3) 1,000 Units, oral, Daily    cinacalcet (SENSIPAR) 30 mg, oral, Daily, Take with food or shortly afer a meal. Swallow tablet whole; do not break or divide.    clotrimazole (MYCELEX) 10 mg, oral, 3 times daily    docusate sodium (COLACE) 100 mg, oral, Daily    FeroSuL tablet 1 tablet, oral, 2 times daily    furosemide (LASIX) 40 mg, oral, 2 times daily    hydrALAZINE (APRESOLINE) 100 mg, oral, 3 times daily    insulin glargine (LANTUS U-100 INSULIN) 20 Units, subcutaneous, Every 24 hours, Take as directed per insulin instructions.    insulin lispro (HumaLOG) 100 unit/mL injection subcutaneous, 3 times daily with meals, 100-199 2 units 200-299 4 units 300-399 6 units     isosorbide mononitrate ER (IMDUR) 60 mg, oral, Daily, Do not crush or chew.    lancets (Unilet Super Thin Lancets) 30 gauge misc 3 times daily    Lokelma 5 g, oral, Every 24 hours    pen needle, diabetic (TechLITE Pen Needle) 32 gauge x 5/32\" needle 1 each, miscellaneous, 3 times daily    polyethylene glycol (GLYCOLAX, MIRALAX) 17 g, oral, Daily    pravastatin (PRAVACHOL) 10 mg, oral, Nightly    predniSONE (DELTASONE) 10 mg, oral, Every morning    Prograf 2.5 mg, oral, Every 12 hours scheduled (0630,1830)    sulfamethoxazole-trimethoprim (Bactrim) 400-80 mg tablet 1 tablet, oral, 2 times daily    tacrolimus (Protopic) 0.1 % ointment Topical, 2 times daily    TechLITE Pen Needle 32 gauge x 5/32\" needle     Unilet Super Thin Lancets 30 gauge " misc 1 each, miscellaneous, 3 times daily       ALLERGY  No Known Allergies    PHYSICAL EXAM:    Visit Vitals  BP (!) 192/101   Pulse 76   Temp 36.2 °C (97.1 °F) (Temporal)   Wt 85.9 kg (189 lb 4.8 oz)   SpO2 100%   BMI 28.78 kg/m²   Smoking Status Never   BSA 2.03 m²      General Appearance - NAD, Good speech, oriented and alert  HEENT - Supple. Not pale. No jaundice. No cervical lymphadenopathy. Pharynx and tonsils are not injected.  CVS - RRR. Normal S1/S2. No click , rub or gallop  Lungs- clear to auscultation bilaterally  Abdomen - soft , not tender, no guarding, no rigidity. No hepatosplenomegaly. Normal bowel sounds. No masses and ascites. Mild discomfort LLQ to deep palpation. Musculoskeletal /Extremities - trace b/l LE edema. Full ROM. No joint tenderness.   Neuro/Psych - appropriate mood and affect. Motor power V/V all extremities. CN I -XII were grossly intact.  Skin - No visible rash      LABS:    Lab Results   Component Value Date    CREATININE 3.93 (H) 01/06/2024    BUN 50 (H) 01/06/2024     01/06/2024    K 5.4 (H) 01/06/2024     (H) 01/06/2024    CO2 24 01/06/2024     Lab Results   Component Value Date    .2 (H) 06/12/2023    CALCIUM 8.9 01/06/2024    PHOS 2.9 01/06/2024     Lab Results   Component Value Date    WBC 3.9 (L) 01/06/2024    HGB 9.2 (L) 01/06/2024    HCT 28.9 (L) 01/06/2024    MCV 88 01/06/2024     (L) 01/06/2024     Lab Results   Component Value Date    IRON 41 10/18/2023    TIBC 170 (L) 10/18/2023    FERRITIN 574 (H) 09/20/2023     Lab Results   Component Value Date    TACROLIMUS 6.9 12/21/2023    CMVPCRIU NOT DETECTED 06/12/2023    BKVIRPCRQN Not Detected 01/06/2022    EBVDNAPCR Not Detected 11/16/2023     Lab Results   Component Value Date    CMVDNAPCR Not Detected 10/18/2023    BKVDNAPCR Not Detected 11/16/2023    EBVDNAPCR Not Detected 11/16/2023         ASSESSMENT AND PLAN:    Mr. Bashir is a 68 y.o. male  who is here for follow up s/p kidney  transplant.    TRANSPLANT DATE: 7/13/2013 (Kidney), 3/26/1994 (Kidney)      1. ESRD S/P kidney transplant   - Creatinine last check was 3.9, trending up recently.  - baseline ~3-3.5. impaired allograft function  - s/p ritux 1gm x2 for immune complex GN noted on recent allograft bx.   -Random urine protein/creatinine ratio is 5.22, recently taken off losartan 25 mg/d sec to ISIAH and hyperK. Given nephrotic range proteinuria, will consider restarting at lower dose once Cr stabilizes and hyperK controlled.  -avoid volume depletion  - Avoid nephrotoxic medications, NSAIDs, and IV contrast.    2. Immunosuppression  -Tacrolimus level last check was 6.9, 12/21/23, acceptable. Goal 5-8.   -Continue current immunosuppression regimen: tac, 2.5 mg bid pred 10mg/d, Imuran 50mg/d.     3. HTN urgency:   - -200/ today. Pt did not take AM meds. C/o worsening low back pain.   - advised pt to proceed to ER for control of BP in a supervised setting, eval and management of low back pain. Pt to also get labs drawn this am.   - hypervolemia controlled. Advised to cut Lasix down to 40 mg/d.  Will incr Imdur to 90 mg/d.  - pt understands and agreeable to go to the ER for management of acute issues. ER providers made aware.     4.. Electrolytes:  - hyperkalemia: ARB on hold. Lokelma 5 g/d started.   - other metabolic parameters acceptable.     5. Bone Mineral Disease/Osteoporosis  - Ca, phos acceptbale. PTH elevated 225 6/2023. Recheck with next labs.   - Consider DEXA every 2-3 years , defer to PCP    6.Anemia/Leukopenia:  - Hb 9.2, slightly down. On PO iron. Will start DARIANA if Hb <9 on rpt labs.   - borderline low WBC, 3.9k. monitor.     7.Health maintenance and vaccination  - Flu shot during flu season annually  - Cancer screening is up to date per the patient    Lab : Routine transplant lab ( CBC, RFP, and anti-rejection trough level ) every 2 weeks  Additional labs:  VIT D, PTH Q3 months  Viral screening PCR, Allosure and UPC  per protocol.    Additional Plan :  - ER today for control of BP in a supervised setting.   - labs q2 weeks.   - will consider resumption of ARB once Cr stabilizes, hyperK controlled    RTC 1 month(s)

## 2024-01-08 NOTE — ED TRIAGE NOTES
Pt from outpt appt, hx renal transplant 2014, says he went to appt today, was told BP 200s/100s, says 'kidney messing up, had an infusion last week to jump start it'

## 2024-01-09 ENCOUNTER — OFFICE VISIT (OUTPATIENT)
Dept: CARDIOLOGY | Facility: HOSPITAL | Age: 69
End: 2024-01-09
Payer: COMMERCIAL

## 2024-01-09 VITALS
HEART RATE: 66 BPM | OXYGEN SATURATION: 99 % | DIASTOLIC BLOOD PRESSURE: 74 MMHG | BODY MASS INDEX: 27.89 KG/M2 | RESPIRATION RATE: 18 BRPM | WEIGHT: 184 LBS | HEIGHT: 68 IN | SYSTOLIC BLOOD PRESSURE: 136 MMHG

## 2024-01-09 DIAGNOSIS — D47.2 MGUS (MONOCLONAL GAMMOPATHY OF UNKNOWN SIGNIFICANCE): ICD-10-CM

## 2024-01-09 DIAGNOSIS — I15.9 SECONDARY HYPERTENSION: ICD-10-CM

## 2024-01-09 DIAGNOSIS — E87.70 HYPERVOLEMIA, UNSPECIFIED HYPERVOLEMIA TYPE: ICD-10-CM

## 2024-01-09 DIAGNOSIS — R07.9 CHEST PAIN, UNSPECIFIED TYPE: Primary | ICD-10-CM

## 2024-01-09 DIAGNOSIS — Z94.0 KIDNEY REPLACED BY TRANSPLANT (HHS-HCC): ICD-10-CM

## 2024-01-09 LAB
C TRACH RRNA SPEC QL NAA+PROBE: NEGATIVE
N GONORRHOEA DNA SPEC QL PROBE+SIG AMP: NEGATIVE
Q ONSET: 219 MS
QRS COUNT: 13 BEATS
QRS DURATION: 138 MS
QT INTERVAL: 392 MS
QTC CALCULATION(BAZETT): 437 MS
QTC FREDERICIA: 422 MS
R AXIS: -51 DEGREES
T AXIS: 49 DEGREES
T OFFSET: 415 MS
T VAGINALIS RRNA SPEC QL NAA+PROBE: NEGATIVE
VENTRICULAR RATE: 75 BPM

## 2024-01-09 PROCEDURE — 1111F DSCHRG MED/CURRENT MED MERGE: CPT | Performed by: INTERNAL MEDICINE

## 2024-01-09 PROCEDURE — 1125F AMNT PAIN NOTED PAIN PRSNT: CPT | Performed by: INTERNAL MEDICINE

## 2024-01-09 PROCEDURE — 1036F TOBACCO NON-USER: CPT | Performed by: INTERNAL MEDICINE

## 2024-01-09 PROCEDURE — 3075F SYST BP GE 130 - 139MM HG: CPT | Performed by: INTERNAL MEDICINE

## 2024-01-09 PROCEDURE — 3008F BODY MASS INDEX DOCD: CPT | Performed by: INTERNAL MEDICINE

## 2024-01-09 PROCEDURE — 1159F MED LIST DOCD IN RCRD: CPT | Performed by: INTERNAL MEDICINE

## 2024-01-09 PROCEDURE — 99214 OFFICE O/P EST MOD 30 MIN: CPT | Performed by: INTERNAL MEDICINE

## 2024-01-09 PROCEDURE — 99204 OFFICE O/P NEW MOD 45 MIN: CPT | Performed by: INTERNAL MEDICINE

## 2024-01-09 PROCEDURE — 3066F NEPHROPATHY DOC TX: CPT | Performed by: INTERNAL MEDICINE

## 2024-01-09 PROCEDURE — 3078F DIAST BP <80 MM HG: CPT | Performed by: INTERNAL MEDICINE

## 2024-01-09 RX ORDER — NAPROXEN SODIUM 220 MG/1
81 TABLET, FILM COATED ORAL DAILY
Qty: 30 TABLET | Refills: 11 | Status: SHIPPED | OUTPATIENT
Start: 2024-01-09 | End: 2025-01-08

## 2024-01-09 RX ORDER — CALCIUM CITRATE/VITAMIN D3 200MG-6.25
TABLET ORAL
COMMUNITY
Start: 2023-12-26 | End: 2024-01-16 | Stop reason: ENTERED-IN-ERROR

## 2024-01-09 RX ORDER — ISOPROPYL ALCOHOL 70 ML/100ML
SWAB TOPICAL
COMMUNITY
Start: 2023-12-26 | End: 2024-01-16 | Stop reason: ENTERED-IN-ERROR

## 2024-01-09 ASSESSMENT — PATIENT HEALTH QUESTIONNAIRE - PHQ9
1. LITTLE INTEREST OR PLEASURE IN DOING THINGS: NOT AT ALL
2. FEELING DOWN, DEPRESSED OR HOPELESS: NOT AT ALL
SUM OF ALL RESPONSES TO PHQ9 QUESTIONS 1 AND 2: 0

## 2024-01-09 ASSESSMENT — ENCOUNTER SYMPTOMS
OCCASIONAL FEELINGS OF UNSTEADINESS: 0
LOSS OF SENSATION IN FEET: 0
DEPRESSION: 0

## 2024-01-09 ASSESSMENT — PAIN SCALES - GENERAL: PAINLEVEL: 6

## 2024-01-09 NOTE — PROGRESS NOTES
Subjective   Manolo Bashir is a 68 y.o. male who presents for cardiology review of chest pain and hypertension on a background of ESRD (status post renal transplant in 1992 and 2013), CKD stage III, MGUS, type 2 diabetes, hypertension, prostate cancer status post resection, chronic urinary incontinence.      Investigations:  TTE (11/2023) - LVEF 60-65%, dilated LV, severe eccentric LVH, mild-moderate LVH, LA moderately dilated.  EKG (1/2024) - SR, non-specific intraventricular block.     HPI  Presented to ER for assessment of HTN yesterday in context of missed medications ahead of planned lab testing.   - trop negative  - given 500 ml bolus for ISIAH     Had hypertension this morning having missed yesterday evening's hydralazine dose.  Single episode of chest discomfort, now resolved. Trop in ER was normal.  BP was well controlled prior to stopping losartan during hospital admission owing to hyperkalemia.  BP in clinic controlled today on:  - Amlodipine 10mg daily   - Carvedilol 37.5 mg bid  - Hydralazine 100mg tid   - ISMN 60 mg daily.     No ankle swelling following compression stockings.  No orthopnea or PND.  No palpitations or blackouts.   Not on aspirin.    ROS  A 10-system review was performed and was unremarkable apart from what is presented in the HPI.     Objective   Physical Exam  Alert and orientated.   Appropriate responses, normal affect.  No respiratory distress at rest.   Skin warm and dry.   Normal radial pulse character and volume. Clinically SR.   Anicteric sclera, no conjunctival pallor.   No JVD or carotid bruits.  Heart sounds dual, no added heart sounds or audible murmurs.   Chest clear on auscultation.   Calves soft, non-tender.  No pedal edema.  EKG - SR, non-specific intraventricular block.     Lab Review:   Lab Results   Component Value Date     01/08/2024    K 4.8 01/08/2024     (H) 01/08/2024    CO2 27 01/08/2024    BUN 53 (H) 01/08/2024    CREATININE 4.23 (H) 01/08/2024     GLUCOSE 72 (L) 01/08/2024    CALCIUM 9.1 01/08/2024     Lab Results   Component Value Date    CKTOTAL 34 10/13/2022    TROPONINI 0.02 02/27/2021     Lab Results   Component Value Date    WBC 4.1 (L) 01/08/2024    HGB 12.0 (L) 01/08/2024    HCT 34.9 (L) 01/08/2024    MCV 86 01/08/2024     (L) 01/08/2024     Lab Results   Component Value Date    CHOL 99 10/26/2023    TRIG 46 10/26/2023    HDL 48.6 10/26/2023       Assessment/Plan   In summary, Manolo Bashir is a 68 y.o. male who presents for cardiology review of chest pain and hypertension on a background of ESRD (status post renal transplant in 1992 and 2013), CKD stage III, MGUS, type 2 diabetes, hypertension, prostate cancer status post resection, chronic urinary incontinence. He recently presented to ER with hypertensive urgency in the context of recent medication changes (losartan cessation for hyperkalemia) in addition to missed doses of medications. He also reported transient episodes of chest pain whilst hypertensive, which have now resolved. On examination today, his BP was satisfactory and he was euvolemic on examination. His EKG showed SR with non-specific intraventricular block. A recent echocardiogram showed preserved LVEF with severe hypertrophy. I have ordered a stress cardiac MRI to evaluate his chest pain and to further assess his severe LVH (exclude infiltration). Pending the CMR he should commence aspirin 81mg daily and continue his other medications. I will follow-up with him after his investigations.

## 2024-01-09 NOTE — PATIENT INSTRUCTIONS
Thank you for attending the cardiology clinic at [facility] today, it was nice to meet you.     Your blood pressure in clinic today was satisfactory.     Your EKG showed a normal heart rhythm.     Add aspirin 81mg daily to your regular medications.     I have ordered a stress cardiac MRI. Please call [phone] to schedule this test.     If you experience a further episode of chest pain you should present to the ER for assessment.    I will follow-up with you in clinic after your MRI scan.

## 2024-01-15 ENCOUNTER — OFFICE VISIT (OUTPATIENT)
Dept: PODIATRY | Facility: CLINIC | Age: 69
End: 2024-01-15
Payer: COMMERCIAL

## 2024-01-15 DIAGNOSIS — E11.9 DIABETES MELLITUS TYPE 2 WITHOUT RETINOPATHY (MULTI): Primary | ICD-10-CM

## 2024-01-15 DIAGNOSIS — I73.9 PERIPHERAL VASCULAR DISEASE (CMS-HCC): ICD-10-CM

## 2024-01-15 PROCEDURE — 3066F NEPHROPATHY DOC TX: CPT | Performed by: PODIATRIST

## 2024-01-15 PROCEDURE — 1036F TOBACCO NON-USER: CPT | Performed by: PODIATRIST

## 2024-01-15 PROCEDURE — 3008F BODY MASS INDEX DOCD: CPT | Performed by: PODIATRIST

## 2024-01-15 PROCEDURE — 1125F AMNT PAIN NOTED PAIN PRSNT: CPT | Performed by: PODIATRIST

## 2024-01-15 PROCEDURE — 1159F MED LIST DOCD IN RCRD: CPT | Performed by: PODIATRIST

## 2024-01-15 PROCEDURE — 99212 OFFICE O/P EST SF 10 MIN: CPT | Performed by: PODIATRIST

## 2024-01-15 PROCEDURE — 1111F DSCHRG MED/CURRENT MED MERGE: CPT | Performed by: PODIATRIST

## 2024-01-15 NOTE — PROGRESS NOTES
History of Present Illness:   Patient states they are here for Dm exam  Denies NTB to feet  Most recent A1C is  Unable to safely trim nails on own      Past Medical History  Past Medical History:   Diagnosis Date    Chronic kidney disease, stage 3 unspecified (CMS/HCC) 09/26/2018    Stage 3 chronic kidney disease    COVID-19 06/18/2020    COVID-19 virus infection    Diabetes (CMS/HCC)     Focal and segmental proliferative glomerulonephritis 12/23/2023    HTN (hypertension)     Other long term (current) drug therapy 07/20/2021    High risk medication use    Personal history of other diseases of the circulatory system     Personal history of cardiac murmur    Personal history of other infectious and parasitic diseases 08/17/2015    History of hepatitis    Polyp, colonic 08/17/2023    Primary osteoarthritis of both ankles 08/17/2023    Tubular adenoma of colon 08/17/2023    Unspecified kidney failure 08/17/2016    Renal failure       Medications and Allergies have been reviewed.    Review Of Systems:  GENERAL: No weight loss, malaise or fevers.  HEENT: Negative for frequent or significant headaches,   RESPIRATORY: Negative for cough, wheezing or shortness of breath.  CARDIOVASCULAR: Negative for chest pain, leg swelling or palpitations.    Physical Exam:  Patient is a pleasant, cooperative, well developed 68 y.o.  adult male. The patient is alert and oriented to time, place and person.   Patient has normal affect and mood.    Examination of Both Lower Extremities:   Vascular  CFT is 3 seconds to hallux bilaterally. No edema or varicosities noted. Hair growth is noted to the digits bilaterally.      Neurology  Gross sensation intact to bilateral foot.     Dermatology  Nails 1-5 bilaterally are within normal limits of length. No hyperkeratotic tissue noted. No subcutaneous nodules or rashes noted. No open lesions noted. Diffuse scales noted to plantar foot b/l.      Musculoskeletal  Muscle strength is 5/5 for  plantarflexors, dorsiflexors, everters, and inverters bilaterally. Ankle joint ROM is decreased in dorsiflexion with the knee extended and flexed, with pain and crepitus bilaterally. Pain on palpation to sinus tarsi and along dorsal ankle. + intrinsic muscle strength to digits 2-4 b/l.      Lymphatics  No streaking erythema noted to bilateral lower extremities.    1. Diabetes mellitus type 2 without retinopathy (CMS/[facility])  Disability Placard      2. Peripheral vascular disease (CMS/[facility])  Disability Placard            Patient educated on proper diabetic foot care.  Nails 1-5 b/l were debrided in thickness and length with nail cutting forceps.  A1C revd.   All hpk tissue was debrided to smooth skin  Gave rx for placard   Patient to follow up in 6 mos or sooner if any problems arise.   Patient was in agreement to this plan. All questions answered.      Daxa Cote DPM  [phone]  Option [address]  Fax: [phone]

## 2024-01-16 ENCOUNTER — APPOINTMENT (OUTPATIENT)
Dept: RADIOLOGY | Facility: HOSPITAL | Age: 69
End: 2024-01-16
Payer: COMMERCIAL

## 2024-01-16 ENCOUNTER — CLINICAL SUPPORT (OUTPATIENT)
Dept: EMERGENCY MEDICINE | Facility: HOSPITAL | Age: 69
End: 2024-01-16
Payer: COMMERCIAL

## 2024-01-16 ENCOUNTER — HOSPITAL ENCOUNTER (INPATIENT)
Facility: HOSPITAL | Age: 69
LOS: 5 days | Discharge: HOME | End: 2024-01-21
Attending: EMERGENCY MEDICINE | Admitting: STUDENT IN AN ORGANIZED HEALTH CARE EDUCATION/TRAINING PROGRAM
Payer: COMMERCIAL

## 2024-01-16 DIAGNOSIS — R07.89 OTHER CHEST PAIN: ICD-10-CM

## 2024-01-16 DIAGNOSIS — Z94.0 KIDNEY REPLACED BY TRANSPLANT (HHS-HCC): ICD-10-CM

## 2024-01-16 DIAGNOSIS — E55.9 VITAMIN D DEFICIENCY: ICD-10-CM

## 2024-01-16 DIAGNOSIS — I10 HYPERTENSION, UNSPECIFIED TYPE: ICD-10-CM

## 2024-01-16 DIAGNOSIS — G89.29 CHRONIC BILATERAL LOW BACK PAIN WITHOUT SCIATICA: ICD-10-CM

## 2024-01-16 DIAGNOSIS — N28.9 RENAL FUNCTION IMPAIRMENT: Primary | ICD-10-CM

## 2024-01-16 DIAGNOSIS — Z94.0 KIDNEY TRANSPLANTED (HHS-HCC): ICD-10-CM

## 2024-01-16 DIAGNOSIS — K21.9 GASTROESOPHAGEAL REFLUX DISEASE WITHOUT ESOPHAGITIS: ICD-10-CM

## 2024-01-16 DIAGNOSIS — M54.50 CHRONIC BILATERAL LOW BACK PAIN WITHOUT SCIATICA: ICD-10-CM

## 2024-01-16 LAB
ALBUMIN SERPL BCP-MCNC: 2.5 G/DL (ref 3.4–5)
ALP SERPL-CCNC: 46 U/L (ref 33–136)
ALT SERPL W P-5'-P-CCNC: 15 U/L (ref 10–52)
ANION GAP SERPL CALC-SCNC: 10 MMOL/L (ref 10–20)
APPEARANCE UR: CLEAR
AST SERPL W P-5'-P-CCNC: 12 U/L (ref 9–39)
ATRIAL RATE: 66 BPM
BASOPHILS # BLD AUTO: 0.01 X10*3/UL (ref 0–0.1)
BASOPHILS NFR BLD AUTO: 0.3 %
BILIRUB SERPL-MCNC: 0.2 MG/DL (ref 0–1.2)
BILIRUB UR STRIP.AUTO-MCNC: NEGATIVE MG/DL
BNP SERPL-MCNC: 551 PG/ML (ref 0–99)
BUN SERPL-MCNC: 56 MG/DL (ref 6–23)
CALCIUM SERPL-MCNC: 8.9 MG/DL (ref 8.6–10.6)
CARDIAC TROPONIN I PNL SERPL HS: 38 NG/L (ref 0–53)
CARDIAC TROPONIN I PNL SERPL HS: 39 NG/L (ref 0–53)
CARDIAC TROPONIN I PNL SERPL HS: 39 NG/L (ref 0–53)
CHLORIDE SERPL-SCNC: 109 MMOL/L (ref 98–107)
CK SERPL-CCNC: 73 U/L (ref 0–325)
CO2 SERPL-SCNC: 25 MMOL/L (ref 21–32)
COLOR UR: YELLOW
CREAT SERPL-MCNC: 4.11 MG/DL (ref 0.5–1.3)
EGFRCR SERPLBLD CKD-EPI 2021: 15 ML/MIN/1.73M*2
EOSINOPHIL # BLD AUTO: 0.04 X10*3/UL (ref 0–0.7)
EOSINOPHIL NFR BLD AUTO: 1.3 %
ERYTHROCYTE [DISTWIDTH] IN BLOOD BY AUTOMATED COUNT: 14 % (ref 11.5–14.5)
FLUAV RNA RESP QL NAA+PROBE: NOT DETECTED
FLUBV RNA RESP QL NAA+PROBE: NOT DETECTED
GLUCOSE BLD MANUAL STRIP-MCNC: 253 MG/DL (ref 74–99)
GLUCOSE SERPL-MCNC: 202 MG/DL (ref 74–99)
GLUCOSE UR STRIP.AUTO-MCNC: ABNORMAL MG/DL
HCT VFR BLD AUTO: 27.8 % (ref 41–52)
HGB BLD-MCNC: 9.7 G/DL (ref 13.5–17.5)
IMM GRANULOCYTES # BLD AUTO: 0.03 X10*3/UL (ref 0–0.7)
IMM GRANULOCYTES NFR BLD AUTO: 1 % (ref 0–0.9)
KETONES UR STRIP.AUTO-MCNC: NEGATIVE MG/DL
LEUKOCYTE ESTERASE UR QL STRIP.AUTO: NEGATIVE
LIPASE SERPL-CCNC: 12 U/L (ref 9–82)
LYMPHOCYTES # BLD AUTO: 0.83 X10*3/UL (ref 1.2–4.8)
LYMPHOCYTES NFR BLD AUTO: 27 %
MCH RBC QN AUTO: 29.3 PG (ref 26–34)
MCHC RBC AUTO-ENTMCNC: 34.9 G/DL (ref 32–36)
MCV RBC AUTO: 84 FL (ref 80–100)
MONOCYTES # BLD AUTO: 0.34 X10*3/UL (ref 0.1–1)
MONOCYTES NFR BLD AUTO: 11.1 %
MUCOUS THREADS #/AREA URNS AUTO: NORMAL /LPF
NEUTROPHILS # BLD AUTO: 1.82 X10*3/UL (ref 1.2–7.7)
NEUTROPHILS NFR BLD AUTO: 59.3 %
NITRITE UR QL STRIP.AUTO: NEGATIVE
NRBC BLD-RTO: 0 /100 WBCS (ref 0–0)
P AXIS: 78 DEGREES
P OFFSET: 167 MS
P ONSET: 130 MS
PH UR STRIP.AUTO: 6 [PH]
PLATELET # BLD AUTO: 111 X10*3/UL (ref 150–450)
POTASSIUM SERPL-SCNC: 4.4 MMOL/L (ref 3.5–5.3)
PR INTERVAL: 176 MS
PROT SERPL-MCNC: 4.6 G/DL (ref 6.4–8.2)
PROT UR STRIP.AUTO-MCNC: ABNORMAL MG/DL
Q ONSET: 218 MS
QRS COUNT: 11 BEATS
QRS DURATION: 130 MS
QT INTERVAL: 414 MS
QTC CALCULATION(BAZETT): 434 MS
QTC FREDERICIA: 427 MS
R AXIS: -30 DEGREES
RBC # BLD AUTO: 3.31 X10*6/UL (ref 4.5–5.9)
RBC # UR STRIP.AUTO: NEGATIVE /UL
RBC #/AREA URNS AUTO: NORMAL /HPF
RSV RNA RESP QL NAA+PROBE: NOT DETECTED
SODIUM SERPL-SCNC: 140 MMOL/L (ref 136–145)
SP GR UR STRIP.AUTO: 1.02
T AXIS: 46 DEGREES
T OFFSET: 425 MS
UROBILINOGEN UR STRIP.AUTO-MCNC: <2 MG/DL
VENTRICULAR RATE: 66 BPM
WBC # BLD AUTO: 3.1 X10*3/UL (ref 4.4–11.3)
WBC #/AREA URNS AUTO: NORMAL /HPF

## 2024-01-16 PROCEDURE — 80053 COMPREHEN METABOLIC PANEL: CPT | Performed by: STUDENT IN AN ORGANIZED HEALTH CARE EDUCATION/TRAINING PROGRAM

## 2024-01-16 PROCEDURE — 99285 EMERGENCY DEPT VISIT HI MDM: CPT | Performed by: EMERGENCY MEDICINE

## 2024-01-16 PROCEDURE — 71046 X-RAY EXAM CHEST 2 VIEWS: CPT | Performed by: RADIOLOGY

## 2024-01-16 PROCEDURE — 84484 ASSAY OF TROPONIN QUANT: CPT | Performed by: STUDENT IN AN ORGANIZED HEALTH CARE EDUCATION/TRAINING PROGRAM

## 2024-01-16 PROCEDURE — 93005 ELECTROCARDIOGRAM TRACING: CPT

## 2024-01-16 PROCEDURE — 82947 ASSAY GLUCOSE BLOOD QUANT: CPT

## 2024-01-16 PROCEDURE — 71046 X-RAY EXAM CHEST 2 VIEWS: CPT

## 2024-01-16 PROCEDURE — 36415 COLL VENOUS BLD VENIPUNCTURE: CPT | Performed by: STUDENT IN AN ORGANIZED HEALTH CARE EDUCATION/TRAINING PROGRAM

## 2024-01-16 PROCEDURE — 81001 URINALYSIS AUTO W/SCOPE: CPT | Performed by: STUDENT IN AN ORGANIZED HEALTH CARE EDUCATION/TRAINING PROGRAM

## 2024-01-16 PROCEDURE — 84484 ASSAY OF TROPONIN QUANT: CPT

## 2024-01-16 PROCEDURE — 87634 RSV DNA/RNA AMP PROBE: CPT | Performed by: STUDENT IN AN ORGANIZED HEALTH CARE EDUCATION/TRAINING PROGRAM

## 2024-01-16 PROCEDURE — 76776 US EXAM K TRANSPL W/DOPPLER: CPT | Performed by: STUDENT IN AN ORGANIZED HEALTH CARE EDUCATION/TRAINING PROGRAM

## 2024-01-16 PROCEDURE — 83880 ASSAY OF NATRIURETIC PEPTIDE: CPT | Performed by: STUDENT IN AN ORGANIZED HEALTH CARE EDUCATION/TRAINING PROGRAM

## 2024-01-16 PROCEDURE — 2500000004 HC RX 250 GENERAL PHARMACY W/ HCPCS (ALT 636 FOR OP/ED): Performed by: STUDENT IN AN ORGANIZED HEALTH CARE EDUCATION/TRAINING PROGRAM

## 2024-01-16 PROCEDURE — 2500000002 HC RX 250 W HCPCS SELF ADMINISTERED DRUGS (ALT 637 FOR MEDICARE OP, ALT 636 FOR OP/ED): Performed by: STUDENT IN AN ORGANIZED HEALTH CARE EDUCATION/TRAINING PROGRAM

## 2024-01-16 PROCEDURE — 87799 DETECT AGENT NOS DNA QUANT: CPT | Performed by: STUDENT IN AN ORGANIZED HEALTH CARE EDUCATION/TRAINING PROGRAM

## 2024-01-16 PROCEDURE — 2500000004 HC RX 250 GENERAL PHARMACY W/ HCPCS (ALT 636 FOR OP/ED): Mod: SE | Performed by: STUDENT IN AN ORGANIZED HEALTH CARE EDUCATION/TRAINING PROGRAM

## 2024-01-16 PROCEDURE — 1210000001 HC SEMI-PRIVATE ROOM DAILY

## 2024-01-16 PROCEDURE — 96374 THER/PROPH/DIAG INJ IV PUSH: CPT

## 2024-01-16 PROCEDURE — 82550 ASSAY OF CK (CPK): CPT | Performed by: STUDENT IN AN ORGANIZED HEALTH CARE EDUCATION/TRAINING PROGRAM

## 2024-01-16 PROCEDURE — 85025 COMPLETE CBC W/AUTO DIFF WBC: CPT | Performed by: STUDENT IN AN ORGANIZED HEALTH CARE EDUCATION/TRAINING PROGRAM

## 2024-01-16 PROCEDURE — 96361 HYDRATE IV INFUSION ADD-ON: CPT

## 2024-01-16 PROCEDURE — 2500000001 HC RX 250 WO HCPCS SELF ADMINISTERED DRUGS (ALT 637 FOR MEDICARE OP): Performed by: STUDENT IN AN ORGANIZED HEALTH CARE EDUCATION/TRAINING PROGRAM

## 2024-01-16 PROCEDURE — 76776 US EXAM K TRANSPL W/DOPPLER: CPT

## 2024-01-16 PROCEDURE — 83690 ASSAY OF LIPASE: CPT | Performed by: STUDENT IN AN ORGANIZED HEALTH CARE EDUCATION/TRAINING PROGRAM

## 2024-01-16 PROCEDURE — 99222 1ST HOSP IP/OBS MODERATE 55: CPT | Performed by: STUDENT IN AN ORGANIZED HEALTH CARE EDUCATION/TRAINING PROGRAM

## 2024-01-16 PROCEDURE — 87636 SARSCOV2 & INF A&B AMP PRB: CPT | Performed by: STUDENT IN AN ORGANIZED HEALTH CARE EDUCATION/TRAINING PROGRAM

## 2024-01-16 RX ORDER — ISOSORBIDE MONONITRATE 30 MG/1
60 TABLET, EXTENDED RELEASE ORAL DAILY
Status: DISCONTINUED | OUTPATIENT
Start: 2024-01-17 | End: 2024-01-17

## 2024-01-16 RX ORDER — INSULIN LISPRO 100 [IU]/ML
0-5 INJECTION, SOLUTION INTRAVENOUS; SUBCUTANEOUS
Status: DISCONTINUED | OUTPATIENT
Start: 2024-01-17 | End: 2024-01-21

## 2024-01-16 RX ORDER — FERROUS SULFATE 325(65) MG
65 TABLET ORAL 2 TIMES DAILY
Status: DISCONTINUED | OUTPATIENT
Start: 2024-01-16 | End: 2024-01-21 | Stop reason: HOSPADM

## 2024-01-16 RX ORDER — CHOLECALCIFEROL (VITAMIN D3) 25 MCG
1000 TABLET ORAL DAILY
Status: DISCONTINUED | OUTPATIENT
Start: 2024-01-17 | End: 2024-01-21 | Stop reason: HOSPADM

## 2024-01-16 RX ORDER — FUROSEMIDE 10 MG/ML
60 INJECTION INTRAMUSCULAR; INTRAVENOUS ONCE
Status: COMPLETED | OUTPATIENT
Start: 2024-01-16 | End: 2024-01-16

## 2024-01-16 RX ORDER — TACROLIMUS 0.5 MG/1
2.5 CAPSULE ORAL
Status: DISCONTINUED | OUTPATIENT
Start: 2024-01-16 | End: 2024-01-21 | Stop reason: HOSPADM

## 2024-01-16 RX ORDER — NAPROXEN SODIUM 220 MG/1
81 TABLET, FILM COATED ORAL DAILY
Status: DISCONTINUED | OUTPATIENT
Start: 2024-01-17 | End: 2024-01-17

## 2024-01-16 RX ORDER — AZATHIOPRINE 50 MG/1
50 TABLET ORAL DAILY
Status: DISCONTINUED | OUTPATIENT
Start: 2024-01-17 | End: 2024-01-21 | Stop reason: HOSPADM

## 2024-01-16 RX ORDER — DEXTROSE MONOHYDRATE 100 MG/ML
0.3 INJECTION, SOLUTION INTRAVENOUS ONCE AS NEEDED
Status: DISCONTINUED | OUTPATIENT
Start: 2024-01-16 | End: 2024-01-21 | Stop reason: HOSPADM

## 2024-01-16 RX ORDER — ACETAMINOPHEN 325 MG/1
3 TABLET ORAL EVERY 6 HOURS PRN
COMMUNITY
End: 2024-02-20 | Stop reason: SDUPTHER

## 2024-01-16 RX ORDER — CALCITRIOL 0.25 UG/1
0.25 CAPSULE ORAL DAILY
Status: DISCONTINUED | OUTPATIENT
Start: 2024-01-17 | End: 2024-01-21 | Stop reason: HOSPADM

## 2024-01-16 RX ORDER — HEPARIN SODIUM 5000 [USP'U]/ML
5000 INJECTION, SOLUTION INTRAVENOUS; SUBCUTANEOUS EVERY 8 HOURS SCHEDULED
Status: DISCONTINUED | OUTPATIENT
Start: 2024-01-16 | End: 2024-01-17

## 2024-01-16 RX ORDER — INSULIN GLARGINE 100 [IU]/ML
12 INJECTION, SOLUTION SUBCUTANEOUS NIGHTLY
Status: DISCONTINUED | OUTPATIENT
Start: 2024-01-16 | End: 2024-01-18

## 2024-01-16 RX ORDER — POLYETHYLENE GLYCOL 3350 17 G/17G
17 POWDER, FOR SOLUTION ORAL DAILY
Status: DISCONTINUED | OUTPATIENT
Start: 2024-01-16 | End: 2024-01-21 | Stop reason: HOSPADM

## 2024-01-16 RX ORDER — PREDNISONE 20 MG/1
10 TABLET ORAL EVERY MORNING
Status: DISCONTINUED | OUTPATIENT
Start: 2024-01-17 | End: 2024-01-20

## 2024-01-16 RX ORDER — HYDRALAZINE HYDROCHLORIDE 50 MG/1
100 TABLET, FILM COATED ORAL 3 TIMES DAILY
Status: DISCONTINUED | OUTPATIENT
Start: 2024-01-16 | End: 2024-01-21 | Stop reason: HOSPADM

## 2024-01-16 RX ORDER — ACETAMINOPHEN 325 MG/1
650 TABLET ORAL EVERY 6 HOURS PRN
Status: DISCONTINUED | OUTPATIENT
Start: 2024-01-16 | End: 2024-01-21 | Stop reason: HOSPADM

## 2024-01-16 RX ORDER — PRAVASTATIN SODIUM 20 MG/1
10 TABLET ORAL NIGHTLY
Status: DISCONTINUED | OUTPATIENT
Start: 2024-01-16 | End: 2024-01-21 | Stop reason: HOSPADM

## 2024-01-16 RX ORDER — AMLODIPINE BESYLATE 5 MG/1
10 TABLET ORAL DAILY
Status: DISCONTINUED | OUTPATIENT
Start: 2024-01-17 | End: 2024-01-17

## 2024-01-16 RX ORDER — DEXTROSE 50 % IN WATER (D50W) INTRAVENOUS SYRINGE
25
Status: DISCONTINUED | OUTPATIENT
Start: 2024-01-16 | End: 2024-01-21 | Stop reason: HOSPADM

## 2024-01-16 RX ORDER — OXYCODONE HYDROCHLORIDE 5 MG/1
5 TABLET ORAL EVERY 6 HOURS PRN
Status: DISCONTINUED | OUTPATIENT
Start: 2024-01-16 | End: 2024-01-17

## 2024-01-16 RX ORDER — CINACALCET 30 MG/1
30 TABLET, FILM COATED ORAL DAILY
Status: DISCONTINUED | OUTPATIENT
Start: 2024-01-17 | End: 2024-01-20

## 2024-01-16 RX ORDER — BISMUTH SUBSALICYLATE 262 MG
1 TABLET,CHEWABLE ORAL DAILY
Status: ON HOLD | COMMUNITY
End: 2024-05-12 | Stop reason: ENTERED-IN-ERROR

## 2024-01-16 RX ORDER — CYCLOBENZAPRINE HCL 10 MG
5 TABLET ORAL ONCE
Status: COMPLETED | OUTPATIENT
Start: 2024-01-16 | End: 2024-01-16

## 2024-01-16 RX ORDER — HYDROMORPHONE HYDROCHLORIDE 1 MG/ML
0.5 INJECTION, SOLUTION INTRAMUSCULAR; INTRAVENOUS; SUBCUTANEOUS ONCE
Status: COMPLETED | OUTPATIENT
Start: 2024-01-16 | End: 2024-01-16

## 2024-01-16 RX ORDER — ONDANSETRON 4 MG/1
4 TABLET, FILM COATED ORAL EVERY 8 HOURS PRN
Status: ON HOLD | COMMUNITY
End: 2024-05-12 | Stop reason: ENTERED-IN-ERROR

## 2024-01-16 RX ADMIN — TACROLIMUS 2.5 MG: 0.5 CAPSULE ORAL at 21:59

## 2024-01-16 RX ADMIN — INSULIN GLARGINE 12 UNITS: 100 INJECTION, SOLUTION SUBCUTANEOUS at 22:09

## 2024-01-16 RX ADMIN — CYCLOBENZAPRINE 5 MG: 10 TABLET, FILM COATED ORAL at 21:59

## 2024-01-16 RX ADMIN — FUROSEMIDE 60 MG: 10 INJECTION, SOLUTION INTRAMUSCULAR; INTRAVENOUS at 21:59

## 2024-01-16 RX ADMIN — HYDROMORPHONE HYDROCHLORIDE 0.5 MG: 1 INJECTION, SOLUTION INTRAMUSCULAR; INTRAVENOUS; SUBCUTANEOUS at 13:22

## 2024-01-16 RX ADMIN — SODIUM CHLORIDE, POTASSIUM CHLORIDE, SODIUM LACTATE AND CALCIUM CHLORIDE 1000 ML: 600; 310; 30; 20 INJECTION, SOLUTION INTRAVENOUS at 12:57

## 2024-01-16 RX ADMIN — CARVEDILOL 37.5 MG: 12.5 TABLET, FILM COATED ORAL at 21:59

## 2024-01-16 RX ADMIN — OXYCODONE HYDROCHLORIDE 5 MG: 5 TABLET ORAL at 19:38

## 2024-01-16 RX ADMIN — FERROUS SULFATE TAB 325 MG (65 MG ELEMENTAL FE) 1 TABLET: 325 (65 FE) TAB at 22:09

## 2024-01-16 RX ADMIN — HYDRALAZINE HYDROCHLORIDE 100 MG: 50 TABLET ORAL at 21:59

## 2024-01-16 ASSESSMENT — LIFESTYLE VARIABLES
HAVE PEOPLE ANNOYED YOU BY CRITICIZING YOUR DRINKING: NO
HAVE YOU EVER FELT YOU SHOULD CUT DOWN ON YOUR DRINKING: NO
EVER FELT BAD OR GUILTY ABOUT YOUR DRINKING: NO
REASON UNABLE TO ASSESS: NO
EVER HAD A DRINK FIRST THING IN THE MORNING TO STEADY YOUR NERVES TO GET RID OF A HANGOVER: NO

## 2024-01-16 ASSESSMENT — PAIN - FUNCTIONAL ASSESSMENT
PAIN_FUNCTIONAL_ASSESSMENT: 0-10
PAIN_FUNCTIONAL_ASSESSMENT: 0-10

## 2024-01-16 ASSESSMENT — ENCOUNTER SYMPTOMS
APPETITE CHANGE: 1
DIFFICULTY URINATING: 1
ABDOMINAL PAIN: 1
BACK PAIN: 1
MYALGIAS: 1

## 2024-01-16 ASSESSMENT — PAIN SCALES - GENERAL: PAINLEVEL_OUTOF10: 8

## 2024-01-16 ASSESSMENT — PAIN DESCRIPTION - LOCATION: LOCATION: ABDOMEN

## 2024-01-16 NOTE — HOSPITAL COURSE
"Manolo Bashir is a 68 y.o. male with a PMHx of ESRD s/p 2x renal transplants (1992, 2013 in [place], currently on prednisone, tacrolimus, azathioprine, last baseline Cr 2.5-2.8), IgG and IgA Lambda MGUS (s/p bone marrow biopsy 10/26/23), angina (last EF 60-65% 2/201), IDDM2 (last A1c 6.0), HTN, prostate cancer s/p radical prostatectomy, HCV (s/p Harvoni with undetectable viral load in 2019) presenting with generalized pain, oliguria, and ISIAH in the setting of hypertensive urgency.     In ED, initial workup significant for , glucose 202, creatinine 4.11, BUN 56, 2x neg trop, 3+ urine protein. ED gave patient given 1L LR and IV Dilaudid. Nephrology consulted and recommended IV Lasix 60mg for 2 days followed by IV Lasix 40mg BID with improvement in urine output. His home amlodipine was changed to nifedipine 60mg BID with marked improvement of his blood pressure. IV Lasix was discontinued once urine outputs improved and patient was continued adequate diuresis on PO torsemide 40mg BID.    Throughout hospital stay, patient continued to report ongoing back pain thought to be musculoskeletal in nature that has improved somewhat with tylenol PRN, Flexeril 5mg TID, lidocaine patches, and topical diclofenac gel. Back pain never completely subsided. Patient also had waxing and waning \"chest pain\" that he described as burning in the epigastric region. Multiple work ups with troponin and EKG looking for cardiac etiology were negative. Pain presumed to be GI in origin and patient was started on PO Protonix 40mg daily. He also received 1x simethicone and Pepcid 20mg. He will follow up with PCP regarding back pain and with vascular surgery. All other appointments are listed above.  "

## 2024-01-16 NOTE — H&P
History Of Present Illness  Manolo Bashir is a 68 y.o. male with a PMHx of ESRD s/p 2x renal transplants (1992, 2013, currently on prednisone, tacrolimus, azathioprine, last baseline Cr 2.5-2.8), MGUS, pancytopenia, angina (last EF 60-65% 2/201), IDDM2 (last A1c 6.0), HTN, prostate cancer s/p radical prostatectomy, HCV (treated and RNA not detected last 10/18/23) presenting with oliguria and ISIAH in the setting of hypertensive urgency.    Patient reports progressive oliguria that started 4 days ago after stopping his losartan per the ED. He has also had poor PO intake with little fluids and developed generalized, non-specific pain across his back, chest, and flank today. He says the pain is achy and hurts all the time and may be worse when pressing on it. He cannot get comfortable. He reports compliance with all of his medications as prescribed. He denies any fever, chills, nausea, vomiting, SOB, trauma.    In ED, initial workup significant for , glucose 202, creatinine 4.11, BUN 56, 2x neg trop, 3+ urine protein.     Past Medical History  Past Medical History:   Diagnosis Date    Chronic kidney disease, stage 3 unspecified (CMS/HCC) 09/26/2018    Stage 3 chronic kidney disease    COVID-19 06/18/2020    COVID-19 virus infection    Diabetes (CMS/HCC)     Focal and segmental proliferative glomerulonephritis 12/23/2023    HTN (hypertension)     Other long term (current) drug therapy 07/20/2021    High risk medication use    Personal history of other diseases of the circulatory system     Personal history of cardiac murmur    Personal history of other infectious and parasitic diseases 08/17/2015    History of hepatitis    Polyp, colonic 08/17/2023    Primary osteoarthritis of both ankles 08/17/2023    Tubular adenoma of colon 08/17/2023    Unspecified kidney failure 08/17/2016    Renal failure       Surgical History  Past Surgical History:   Procedure Laterality Date    ILEOSTOMY  04/25/2017    Ileostomy     ILEOSTOMY CLOSURE  08/17/2015    Ileostomy Closure    OTHER SURGICAL HISTORY  04/21/2017    Right Hemicolectomy    OTHER SURGICAL HISTORY  08/17/2015    Arteriovenous Surgery Creation Of A-V Fistula    OTHER SURGICAL HISTORY  08/17/2015    Sigmoidoscopy (Fiberoptic, Therapeutic )    PROSTATECTOMY  10/11/2013    Prostatectomy Radical    TRANSPLANT, KIDNEY, OPEN  1992    TRANSPLANT, KIDNEY, OPEN  2013    US GUIDED PERCUTANEOUS BIOPSY RENAL LEFT Left 11/20/2023    US GUIDED PERCUTANEOUS BIOPSY RENAL LEFT 11/20/2023 Lou Rodgers MD [facility]    US GUIDED PERCUTANEOUS PERITONEAL OR RETROPERITONEAL FLUID COLLECTION DRAINAGE  10/20/2022    US GUIDED PERCUTANEOUS PERITONEAL OR RETROPERITONEAL FLUID COLLECTION DRAINAGE 10/20/2022 [facility]GAC[facility]        Social History  He reports that he has never smoked. He has never used smokeless tobacco. He reports current alcohol use. He reports current drug use. Drug: Oxycodone.    Family History  Family History   Problem Relation Name Age of Onset    Bone cancer Mother      Other (corona's sarcome of the bone marrow) Mother      Prostate cancer Father      Diabetes Other Family Hist     Hypertension Other Family Hist         Allergies  Patient has no known allergies.    Review of Systems   Constitutional:  Positive for appetite change.   Cardiovascular:  Positive for chest pain and leg swelling.   Gastrointestinal:  Positive for abdominal pain.   Genitourinary:  Positive for decreased urine volume and difficulty urinating.   Musculoskeletal:  Positive for back pain and myalgias.   All other systems reviewed and are negative.      Const: Well-nourished, Well-developed  Eyes: PERRL, no conjunctival injection, and symmetrical lids  HENMT: Atraumatic external nose and ears, Moist MM  Neck: Symmetric, trachea midline, No thyromegaly  CVS: +S1/S2, No murmurs, rubs, or gallops, Peripheral pulses 2+ and equal in all extremities, 2+ pitting edema bilateral LE  RESP: Unlabored respiratory  "effort, Clear to auscultation bilaterally  GI: Distended, nontender, No hepatosplenomegaly  MSK: Extremities w/o deformity or ttp, No cyanosis or clubbing  Skin: Warm, Dry, No rashes or lesions  Neuro: CNs II-XII grossly intact, Sensation grossly intact  Psych: Alert, & Oriented x3, Appropriate mood and affect      Last Recorded Vitals  Blood pressure (!) 191/92, pulse 70, temperature 36.5 °C (97.7 °F), temperature source Skin, resp. rate 16, height 1.727 m (5' 8\"), weight 81.6 kg (180 lb), SpO2 96 %.    Relevant Results  Results for orders placed or performed during the hospital encounter of 01/16/24 (from the past 24 hour(s))   CBC and Auto Differential   Result Value Ref Range    WBC 3.1 (L) 4.4 - 11.3 x10*3/uL    nRBC 0.0 0.0 - 0.0 /100 WBCs    RBC 3.31 (L) 4.50 - 5.90 x10*6/uL    Hemoglobin 9.7 (L) 13.5 - 17.5 g/dL    Hematocrit 27.8 (L) 41.0 - 52.0 %    MCV 84 80 - 100 fL    MCH 29.3 26.0 - 34.0 pg    MCHC 34.9 32.0 - 36.0 g/dL    RDW 14.0 11.5 - 14.5 %    Platelets 111 (L) 150 - 450 x10*3/uL    Neutrophils % 59.3 40.0 - 80.0 %    Immature Granulocytes %, Automated 1.0 (H) 0.0 - 0.9 %    Lymphocytes % 27.0 13.0 - 44.0 %    Monocytes % 11.1 2.0 - 10.0 %    Eosinophils % 1.3 0.0 - 6.0 %    Basophils % 0.3 0.0 - 2.0 %    Neutrophils Absolute 1.82 1.20 - 7.70 x10*3/uL    Immature Granulocytes Absolute, Automated 0.03 0.00 - 0.70 x10*3/uL    Lymphocytes Absolute 0.83 (L) 1.20 - 4.80 x10*3/uL    Monocytes Absolute 0.34 0.10 - 1.00 x10*3/uL    Eosinophils Absolute 0.04 0.00 - 0.70 x10*3/uL    Basophils Absolute 0.01 0.00 - 0.10 x10*3/uL   Comprehensive metabolic panel   Result Value Ref Range    Glucose 202 (H) 74 - 99 mg/dL    Sodium 140 136 - 145 mmol/L    Potassium 4.4 3.5 - 5.3 mmol/L    Chloride 109 (H) 98 - 107 mmol/L    Bicarbonate 25 21 - 32 mmol/L    Anion Gap 10 10 - 20 mmol/L    Urea Nitrogen 56 (H) 6 - 23 mg/dL    Creatinine 4.11 (H) 0.50 - 1.30 mg/dL    eGFR 15 (L) >60 mL/min/1.73m*2    Calcium 8.9 8.6 - " 10.6 mg/dL    Albumin 2.5 (L) 3.4 - 5.0 g/dL    Alkaline Phosphatase 46 33 - 136 U/L    Total Protein 4.6 (L) 6.4 - 8.2 g/dL    AST 12 9 - 39 U/L    Bilirubin, Total 0.2 0.0 - 1.2 mg/dL    ALT 15 10 - 52 U/L   B-type natriuretic peptide   Result Value Ref Range     (H) 0 - 99 pg/mL   Troponin I, High Sensitivity, Initial   Result Value Ref Range    Troponin I, High Sensitivity 39 0 - 53 ng/L   Troponin, High Sensitivity, 1 Hour   Result Value Ref Range    Troponin I, High Sensitivity 39 0 - 53 ng/L      EKG: normal sinus rhythm, occasional PVCs, right axis deviation    Assessment/Plan   Manolo Bashir is a 68 y.o. male with a PMHx of ESRD s/p 2x renal transplants (1992, 2013, currently on prednisone, tacrolimus, azathioprine, last baseline Cr 2.5-2.8), MGUS, pancytopenia, angina (last EF 60-65% 2/201), IDDM2 (last A1c 6.0), HTN, prostate cancer s/p radical prostatectomy, HCV (treated and RNA not detected last 10/18/23) presenting with oliguria and ISIAH in the setting of hypertensive urgency. Patient is clearly volume overloaded on exam and labs. Kidney function is below baseline. Etiology likely cardiorenal: renal graft failure vs heart failure (HFpEF) with secondary renal dysfunction. Patient will be admitted with IV diuresis to reduce volume overload with renal US to help assess for graft functioning. Will speak with nephrology regarding recommendations given transplant history.    # ISIAH  # Hx of ESRD s/p renal transplant 2x  # CKD 3  # Oliguria  - Creatinine 4.11 on admission, up from 2.5-3.0 baseline  - Progressive worsening oliguria  - Follows with renal transplant at   PLAN:  - Continue home Vit D, cinacalcet, iron  - Flu, COVID, RSV  - qAM RFP, Mg, CBC  - Consulted nephrology, recommendations below:  - 1x Lasix 60mg now  - Renal US  - CMV, BK, EBV PCRs    # DM2  - Glucose 200 on admission  - Insulin dependent, on 24 units of lantus + SSI  PLAN:  - Start 12 units lantus qHS  - SSI + Accucheks  -  Hypoglycemia protocol    # Chronic hypertension  # Hypertensive urgency  # Angina  - Presented with BP>180s, baseline is ~150s  - Home meds: ASA 81mg, coreg 37.5mg BID, amlodipine 10mg daily, hydralazine 100mg TID, imdur 60 daily  PLAN:  - Resume home antihypertensive regimen    # MGUS  # Immunosuppression  # Pancytopenia  - Low cell counts on admission  - Home meds: tacrolimus, prednisone, and azathioprine  PLAN:  - Resume immunosuppressive regimen  - AM Tacro level    DVT ppx: sub-q heparin  Pain: Flexeril, tylenol PRN, oxy PRN    Patient to be discussed with Dr. Ojeda.    Patrice Anton MS4  1/16/24

## 2024-01-17 ENCOUNTER — CLINICAL SUPPORT (OUTPATIENT)
Dept: EMERGENCY MEDICINE | Facility: HOSPITAL | Age: 69
End: 2024-01-17
Payer: COMMERCIAL

## 2024-01-17 LAB
ALBUMIN SERPL BCP-MCNC: 2.4 G/DL (ref 3.4–5)
ALBUMIN SERPL BCP-MCNC: 2.6 G/DL (ref 3.4–5)
ANION GAP SERPL CALC-SCNC: 13 MMOL/L (ref 10–20)
ANION GAP SERPL CALC-SCNC: 9 MMOL/L (ref 10–20)
ATRIAL RATE: 69 BPM
BASOPHILS # BLD AUTO: 0.01 X10*3/UL (ref 0–0.1)
BASOPHILS NFR BLD AUTO: 0.3 %
BKV DNA SERPL NAA+PROBE-LOG#: NORMAL {LOG_COPIES}/ML
BUN SERPL-MCNC: 52 MG/DL (ref 6–23)
BUN SERPL-MCNC: 56 MG/DL (ref 6–23)
CALCIUM SERPL-MCNC: 8.5 MG/DL (ref 8.6–10.6)
CALCIUM SERPL-MCNC: 8.5 MG/DL (ref 8.6–10.6)
CHLORIDE SERPL-SCNC: 107 MMOL/L (ref 98–107)
CHLORIDE SERPL-SCNC: 107 MMOL/L (ref 98–107)
CMV DNA SERPL NAA+PROBE-LOG IU: NORMAL {LOG_IU}/ML
CO2 SERPL-SCNC: 21 MMOL/L (ref 21–32)
CO2 SERPL-SCNC: 27 MMOL/L (ref 21–32)
CREAT SERPL-MCNC: 3.93 MG/DL (ref 0.5–1.3)
CREAT SERPL-MCNC: 4.04 MG/DL (ref 0.5–1.3)
EBV DNA SPEC NAA+PROBE-LOG#: NORMAL {LOG_COPIES}/ML
EGFRCR SERPLBLD CKD-EPI 2021: 15 ML/MIN/1.73M*2
EGFRCR SERPLBLD CKD-EPI 2021: 16 ML/MIN/1.73M*2
EOSINOPHIL # BLD AUTO: 0.01 X10*3/UL (ref 0–0.7)
EOSINOPHIL NFR BLD AUTO: 0.3 %
ERYTHROCYTE [DISTWIDTH] IN BLOOD BY AUTOMATED COUNT: 13.6 % (ref 11.5–14.5)
GLUCOSE BLD MANUAL STRIP-MCNC: 147 MG/DL (ref 74–99)
GLUCOSE BLD MANUAL STRIP-MCNC: 152 MG/DL (ref 74–99)
GLUCOSE BLD MANUAL STRIP-MCNC: 198 MG/DL (ref 74–99)
GLUCOSE BLD MANUAL STRIP-MCNC: 224 MG/DL (ref 74–99)
GLUCOSE SERPL-MCNC: 148 MG/DL (ref 74–99)
GLUCOSE SERPL-MCNC: 264 MG/DL (ref 74–99)
HCT VFR BLD AUTO: 26.6 % (ref 41–52)
HGB BLD-MCNC: 9.6 G/DL (ref 13.5–17.5)
HOLD SPECIMEN: NORMAL
HOLD SPECIMEN: NORMAL
IMM GRANULOCYTES # BLD AUTO: 0.02 X10*3/UL (ref 0–0.7)
IMM GRANULOCYTES NFR BLD AUTO: 0.7 % (ref 0–0.9)
LABORATORY COMMENT REPORT: NOT DETECTED
LYMPHOCYTES # BLD AUTO: 0.63 X10*3/UL (ref 1.2–4.8)
LYMPHOCYTES NFR BLD AUTO: 21.3 %
MAGNESIUM SERPL-MCNC: 1.73 MG/DL (ref 1.6–2.4)
MAGNESIUM SERPL-MCNC: 1.73 MG/DL (ref 1.6–2.4)
MCH RBC QN AUTO: 30 PG (ref 26–34)
MCHC RBC AUTO-ENTMCNC: 36.1 G/DL (ref 32–36)
MCV RBC AUTO: 83 FL (ref 80–100)
MONOCYTES # BLD AUTO: 0.15 X10*3/UL (ref 0.1–1)
MONOCYTES NFR BLD AUTO: 5.1 %
NEUTROPHILS # BLD AUTO: 2.14 X10*3/UL (ref 1.2–7.7)
NEUTROPHILS NFR BLD AUTO: 72.3 %
NRBC BLD-RTO: 0 /100 WBCS (ref 0–0)
P AXIS: 79 DEGREES
P OFFSET: 186 MS
P ONSET: 128 MS
PHOSPHATE SERPL-MCNC: 3.2 MG/DL (ref 2.5–4.9)
PHOSPHATE SERPL-MCNC: 3.5 MG/DL (ref 2.5–4.9)
PLATELET # BLD AUTO: 98 X10*3/UL (ref 150–450)
POTASSIUM SERPL-SCNC: 4.8 MMOL/L (ref 3.5–5.3)
POTASSIUM SERPL-SCNC: 5.2 MMOL/L (ref 3.5–5.3)
PR INTERVAL: 174 MS
Q ONSET: 215 MS
QRS COUNT: 12 BEATS
QRS DURATION: 96 MS
QT INTERVAL: 410 MS
QTC CALCULATION(BAZETT): 439 MS
QTC FREDERICIA: 429 MS
R AXIS: 129 DEGREES
RBC # BLD AUTO: 3.2 X10*6/UL (ref 4.5–5.9)
SARS-COV-2 RNA RESP QL NAA+PROBE: NOT DETECTED
SODIUM SERPL-SCNC: 136 MMOL/L (ref 136–145)
SODIUM SERPL-SCNC: 138 MMOL/L (ref 136–145)
T AXIS: -4 DEGREES
T OFFSET: 420 MS
TACROLIMUS BLD-MCNC: 13.4 NG/ML
UFH PPP CHRO-ACNC: 0.5 IU/ML
VENTRICULAR RATE: 69 BPM
WBC # BLD AUTO: 3 X10*3/UL (ref 4.4–11.3)

## 2024-01-17 PROCEDURE — 2500000001 HC RX 250 WO HCPCS SELF ADMINISTERED DRUGS (ALT 637 FOR MEDICARE OP)

## 2024-01-17 PROCEDURE — 80197 ASSAY OF TACROLIMUS: CPT | Performed by: STUDENT IN AN ORGANIZED HEALTH CARE EDUCATION/TRAINING PROGRAM

## 2024-01-17 PROCEDURE — 85520 HEPARIN ASSAY: CPT

## 2024-01-17 PROCEDURE — 93005 ELECTROCARDIOGRAM TRACING: CPT

## 2024-01-17 PROCEDURE — 99221 1ST HOSP IP/OBS SF/LOW 40: CPT | Performed by: INTERNAL MEDICINE

## 2024-01-17 PROCEDURE — 99232 SBSQ HOSP IP/OBS MODERATE 35: CPT | Performed by: STUDENT IN AN ORGANIZED HEALTH CARE EDUCATION/TRAINING PROGRAM

## 2024-01-17 PROCEDURE — 2500000004 HC RX 250 GENERAL PHARMACY W/ HCPCS (ALT 636 FOR OP/ED): Performed by: STUDENT IN AN ORGANIZED HEALTH CARE EDUCATION/TRAINING PROGRAM

## 2024-01-17 PROCEDURE — 2500000004 HC RX 250 GENERAL PHARMACY W/ HCPCS (ALT 636 FOR OP/ED)

## 2024-01-17 PROCEDURE — 2500000002 HC RX 250 W HCPCS SELF ADMINISTERED DRUGS (ALT 637 FOR MEDICARE OP, ALT 636 FOR OP/ED): Performed by: STUDENT IN AN ORGANIZED HEALTH CARE EDUCATION/TRAINING PROGRAM

## 2024-01-17 PROCEDURE — 82947 ASSAY GLUCOSE BLOOD QUANT: CPT

## 2024-01-17 PROCEDURE — 83735 ASSAY OF MAGNESIUM: CPT | Performed by: STUDENT IN AN ORGANIZED HEALTH CARE EDUCATION/TRAINING PROGRAM

## 2024-01-17 PROCEDURE — 36415 COLL VENOUS BLD VENIPUNCTURE: CPT

## 2024-01-17 PROCEDURE — 85025 COMPLETE CBC W/AUTO DIFF WBC: CPT | Performed by: STUDENT IN AN ORGANIZED HEALTH CARE EDUCATION/TRAINING PROGRAM

## 2024-01-17 PROCEDURE — 1210000001 HC SEMI-PRIVATE ROOM DAILY

## 2024-01-17 PROCEDURE — 2500000001 HC RX 250 WO HCPCS SELF ADMINISTERED DRUGS (ALT 637 FOR MEDICARE OP): Performed by: STUDENT IN AN ORGANIZED HEALTH CARE EDUCATION/TRAINING PROGRAM

## 2024-01-17 PROCEDURE — 80069 RENAL FUNCTION PANEL: CPT | Performed by: STUDENT IN AN ORGANIZED HEALTH CARE EDUCATION/TRAINING PROGRAM

## 2024-01-17 PROCEDURE — 87799 DETECT AGENT NOS DNA QUANT: CPT | Performed by: STUDENT IN AN ORGANIZED HEALTH CARE EDUCATION/TRAINING PROGRAM

## 2024-01-17 PROCEDURE — 83735 ASSAY OF MAGNESIUM: CPT

## 2024-01-17 PROCEDURE — 80069 RENAL FUNCTION PANEL: CPT

## 2024-01-17 RX ORDER — HEPARIN SODIUM 10000 [USP'U]/100ML
0-4000 INJECTION, SOLUTION INTRAVENOUS CONTINUOUS
Status: DISCONTINUED | OUTPATIENT
Start: 2024-01-17 | End: 2024-01-17

## 2024-01-17 RX ORDER — NAPROXEN SODIUM 220 MG/1
81 TABLET, FILM COATED ORAL DAILY
Status: DISCONTINUED | OUTPATIENT
Start: 2024-01-18 | End: 2024-01-21 | Stop reason: HOSPADM

## 2024-01-17 RX ORDER — HEPARIN SODIUM 5000 [USP'U]/ML
4000 INJECTION, SOLUTION INTRAVENOUS; SUBCUTANEOUS ONCE
Status: COMPLETED | OUTPATIENT
Start: 2024-01-17 | End: 2024-01-17

## 2024-01-17 RX ORDER — HEPARIN SODIUM 5000 [USP'U]/ML
2000-4000 INJECTION, SOLUTION INTRAVENOUS; SUBCUTANEOUS EVERY 4 HOURS PRN
Status: DISCONTINUED | OUTPATIENT
Start: 2024-01-17 | End: 2024-01-17

## 2024-01-17 RX ORDER — CLOPIDOGREL BISULFATE 300 MG/1
300 TABLET, FILM COATED ORAL ONCE
Status: COMPLETED | OUTPATIENT
Start: 2024-01-17 | End: 2024-01-17

## 2024-01-17 RX ORDER — NIFEDIPINE 60 MG/1
60 TABLET, FILM COATED, EXTENDED RELEASE ORAL
Status: DISCONTINUED | OUTPATIENT
Start: 2024-01-17 | End: 2024-01-20

## 2024-01-17 RX ORDER — NAPROXEN SODIUM 220 MG/1
324 TABLET, FILM COATED ORAL ONCE
Status: COMPLETED | OUTPATIENT
Start: 2024-01-17 | End: 2024-01-17

## 2024-01-17 RX ORDER — CYCLOBENZAPRINE HCL 10 MG
5 TABLET ORAL 3 TIMES DAILY
Status: DISCONTINUED | OUTPATIENT
Start: 2024-01-17 | End: 2024-01-21

## 2024-01-17 RX ORDER — FUROSEMIDE 10 MG/ML
60 INJECTION INTRAMUSCULAR; INTRAVENOUS ONCE
Status: COMPLETED | OUTPATIENT
Start: 2024-01-17 | End: 2024-01-17

## 2024-01-17 RX ORDER — CLOPIDOGREL BISULFATE 75 MG/1
75 TABLET ORAL DAILY
Status: DISCONTINUED | OUTPATIENT
Start: 2024-01-18 | End: 2024-01-17

## 2024-01-17 RX ORDER — ISOSORBIDE MONONITRATE 30 MG/1
90 TABLET, EXTENDED RELEASE ORAL DAILY
Status: DISCONTINUED | OUTPATIENT
Start: 2024-01-18 | End: 2024-01-18

## 2024-01-17 RX ORDER — CLOPIDOGREL BISULFATE 75 MG/1
150 TABLET ORAL ONCE
Status: DISCONTINUED | OUTPATIENT
Start: 2024-01-17 | End: 2024-01-17

## 2024-01-17 RX ADMIN — CINACALCET 30 MG: 30 TABLET ORAL at 08:38

## 2024-01-17 RX ADMIN — INSULIN LISPRO 1 UNITS: 100 INJECTION, SOLUTION INTRAVENOUS; SUBCUTANEOUS at 08:56

## 2024-01-17 RX ADMIN — NIFEDIPINE 60 MG: 60 TABLET, FILM COATED, EXTENDED RELEASE ORAL at 15:54

## 2024-01-17 RX ADMIN — PREDNISONE 10 MG: 20 TABLET ORAL at 08:38

## 2024-01-17 RX ADMIN — CALCITRIOL CAPSULES 0.25 MCG 0.25 MCG: 0.25 CAPSULE ORAL at 10:22

## 2024-01-17 RX ADMIN — POLYETHYLENE GLYCOL 3350 17 G: 17 POWDER, FOR SOLUTION ORAL at 08:38

## 2024-01-17 RX ADMIN — TACROLIMUS 2.5 MG: 0.5 CAPSULE ORAL at 18:29

## 2024-01-17 RX ADMIN — HEPARIN SODIUM 4000 UNITS: 5000 INJECTION, SOLUTION INTRAVENOUS; SUBCUTANEOUS at 00:52

## 2024-01-17 RX ADMIN — AMLODIPINE BESYLATE 10 MG: 5 TABLET ORAL at 08:42

## 2024-01-17 RX ADMIN — CARVEDILOL 37.5 MG: 12.5 TABLET, FILM COATED ORAL at 08:38

## 2024-01-17 RX ADMIN — FERROUS SULFATE TAB 325 MG (65 MG ELEMENTAL FE) 1 TABLET: 325 (65 FE) TAB at 21:25

## 2024-01-17 RX ADMIN — HEPARIN SODIUM 1000 UNITS/HR: 10000 INJECTION, SOLUTION INTRAVENOUS at 00:53

## 2024-01-17 RX ADMIN — CLOPIDOGREL BISULFATE 300 MG: 300 TABLET, FILM COATED ORAL at 00:49

## 2024-01-17 RX ADMIN — INSULIN LISPRO 1 UNITS: 100 INJECTION, SOLUTION INTRAVENOUS; SUBCUTANEOUS at 12:42

## 2024-01-17 RX ADMIN — CYCLOBENZAPRINE 5 MG: 10 TABLET, FILM COATED ORAL at 14:29

## 2024-01-17 RX ADMIN — ISOSORBIDE MONONITRATE 60 MG: 30 TABLET, EXTENDED RELEASE ORAL at 08:38

## 2024-01-17 RX ADMIN — Medication 1000 UNITS: at 08:38

## 2024-01-17 RX ADMIN — CYCLOBENZAPRINE 5 MG: 10 TABLET, FILM COATED ORAL at 10:36

## 2024-01-17 RX ADMIN — ACETAMINOPHEN 650 MG: 325 TABLET ORAL at 12:52

## 2024-01-17 RX ADMIN — FERROUS SULFATE TAB 325 MG (65 MG ELEMENTAL FE) 1 TABLET: 325 (65 FE) TAB at 08:38

## 2024-01-17 RX ADMIN — CYCLOBENZAPRINE 5 MG: 10 TABLET, FILM COATED ORAL at 21:25

## 2024-01-17 RX ADMIN — HYDRALAZINE HYDROCHLORIDE 100 MG: 50 TABLET ORAL at 14:29

## 2024-01-17 RX ADMIN — HYDRALAZINE HYDROCHLORIDE 100 MG: 50 TABLET ORAL at 08:38

## 2024-01-17 RX ADMIN — OXYCODONE HYDROCHLORIDE 5 MG: 5 TABLET ORAL at 01:16

## 2024-01-17 RX ADMIN — TACROLIMUS 2.5 MG: 0.5 CAPSULE ORAL at 06:43

## 2024-01-17 RX ADMIN — CARVEDILOL 37.5 MG: 12.5 TABLET, FILM COATED ORAL at 21:25

## 2024-01-17 RX ADMIN — FUROSEMIDE 60 MG: 10 INJECTION, SOLUTION INTRAVENOUS at 15:54

## 2024-01-17 RX ADMIN — INSULIN GLARGINE 12 UNITS: 100 INJECTION, SOLUTION SUBCUTANEOUS at 21:32

## 2024-01-17 RX ADMIN — HYDRALAZINE HYDROCHLORIDE 100 MG: 50 TABLET ORAL at 21:25

## 2024-01-17 RX ADMIN — AZATHIOPRINE 50 MG: 50 TABLET ORAL at 10:23

## 2024-01-17 RX ADMIN — ASPIRIN 81 MG 324 MG: 81 TABLET ORAL at 00:48

## 2024-01-17 ASSESSMENT — PAIN DESCRIPTION - LOCATION: LOCATION: ABDOMEN

## 2024-01-17 ASSESSMENT — PAIN SCALES - GENERAL
PAINLEVEL_OUTOF10: 5 - MODERATE PAIN
PAINLEVEL_OUTOF10: 8
PAINLEVEL_OUTOF10: 2

## 2024-01-17 ASSESSMENT — PAIN - FUNCTIONAL ASSESSMENT: PAIN_FUNCTIONAL_ASSESSMENT: 0-10

## 2024-01-17 ASSESSMENT — PAIN DESCRIPTION - PROGRESSION: CLINICAL_PROGRESSION: GRADUALLY IMPROVING

## 2024-01-17 NOTE — ED PROVIDER NOTES
CC: Urinary Retention (Kidney Transplant )     HPI:  Patient is a 68-year-old male with PMH of ESRD status post renal transplant x 2 (1992, 2013) on immunosuppressive therapy, MGUS, IDDM 2, HTN, and prostate cancer status post radical prostatectomy, presenting to the ED with decreased urine output and fluid retention.  Patient states he started having decreased urine output approximately 4 days ago after stopping his losartan per transplant team at last visit.  Patient has also had decreased appetite and decreased fluid intake.  States he is having worsening acute on chronic pain in his back chest and flank described as achy.  States he has been compliant with his medications but did not take his tacrolimus this morning due to the way he was feeling.  Denies any fever, chills, nausea, vomiting, shortness of breath, cough or abdominal pain.  Patient feels like his kidney is failing.      Limitations to History: None    Additional History Obtained from: none    Records Reviewed: Recent available ED and inpatient notes reviewed in EMR.    PMHx/PSHx:  Per HPI.   - has a past medical history of Chronic kidney disease, stage 3 unspecified (CMS/[facility]) (09/26/2018), COVID-19 (06/18/2020), Diabetes (CMS/[facility]), Focal and segmental proliferative glomerulonephritis (12/23/2023), HTN (hypertension), Other long term (current) drug therapy (07/20/2021), Personal history of other diseases of the circulatory system, Personal history of other infectious and parasitic diseases (08/17/2015), Polyp, colonic (08/17/2023), Primary osteoarthritis of both ankles (08/17/2023), Tubular adenoma of colon (08/17/2023), and Unspecified kidney failure (08/17/2016).  - has a past surgical history that includes Prostatectomy (10/11/2013); Ileostomy (04/25/2017); Other surgical history (04/21/2017); Ileostomy closure (08/17/2015); Other surgical history (08/17/2015); Other surgical history (08/17/2015); US guided percutaneous peritoneal or  retroperitoneal fluid collection drainage (10/20/2022); transplant, kidney, open (1992); transplant, kidney, open (2013); and US guided percutaneous biopsy renal left (Left, 11/20/2023).    Medications: Reviewed in EMR. See EMR for complete list of medications and doses.    Allergies:  Patient has no known allergies.    Social History:  - Tobacco:  reports that he has never smoked. He has never used smokeless tobacco.   - Alcohol:  reports current alcohol use.   - Illicit Drugs:  reports current drug use. Drug: Oxycodone.   ???????????????????????????????????????????????????????????????  Triage Vitals:  T 36.5 °C (97.7 °F)  HR 68  /72  RR 16  O2 97 % None (Room air)    PHYSICAL EXAM:   VS: As documented in the triage note and EMR flowsheet from this visit were reviewed.  Gen: elderly male resting in bed, NAD.   Eyes: PERRL, EOMI. Clear scerla.  HENT: NC/AT, Mucosal membranes dry..   Neck: Supple, no cervical LAD  Resp: Non-labored breathing on RA, CTAB, no wheezes or crackles  CV: RRR, nl S1, S2, no murmurs  Abd: Soft, non-distended, non-tender, no rebound or guarding  Back: no midline spinal tenderness  Ext: b/l pitting LE edema, pulses full and equal  Skin: WWP. No systemic rashes or lesions.  MSK: normal muscle bulk, no obvious joint swelling in extremities  Neuro:  AAOx3, speech fluent, MAEx4, no focal deficit  Psych: Maintains eye contact, Appropriate mood and affect  ???????????????????????????????????????????????????????????????    ED Labs/Imaging:   Labs Reviewed   CBC WITH AUTO DIFFERENTIAL - Abnormal       Result Value    WBC 3.1 (*)     nRBC 0.0      RBC 3.31 (*)     Hemoglobin 9.7 (*)     Hematocrit 27.8 (*)     MCV 84      MCH 29.3      MCHC 34.9      RDW 14.0      Platelets 111 (*)     Neutrophils % 59.3      Immature Granulocytes %, Automated 1.0 (*)     Lymphocytes % 27.0      Monocytes % 11.1      Eosinophils % 1.3      Basophils % 0.3      Neutrophils Absolute 1.82      Immature  Granulocytes Absolute, Automated 0.03      Lymphocytes Absolute 0.83 (*)     Monocytes Absolute 0.34      Eosinophils Absolute 0.04      Basophils Absolute 0.01     COMPREHENSIVE METABOLIC PANEL - Abnormal    Glucose 202 (*)     Sodium 140      Potassium 4.4      Chloride 109 (*)     Bicarbonate 25      Anion Gap 10      Urea Nitrogen 56 (*)     Creatinine 4.11 (*)     eGFR 15 (*)     Calcium 8.9      Albumin 2.5 (*)     Alkaline Phosphatase 46      Total Protein 4.6 (*)     AST 12      Bilirubin, Total 0.2      ALT 15     URINALYSIS WITH REFLEX CULTURE AND MICROSCOPIC - Abnormal    Color, Urine Yellow      Appearance, Urine Clear      Specific Gravity, Urine 1.017      pH, Urine 6.0      Protein, Urine >=500 (3+) (*)     Glucose, Urine 50 (1+) (*)     Blood, Urine NEGATIVE      Ketones, Urine NEGATIVE      Bilirubin, Urine NEGATIVE      Urobilinogen, Urine <2.0      Nitrite, Urine NEGATIVE      Leukocyte Esterase, Urine NEGATIVE     B-TYPE NATRIURETIC PEPTIDE - Abnormal     (*)     Narrative:        <100 pg/mL - Heart failure unlikely  100-299 pg/mL - Intermediate probability of acute heart                  failure exacerbation. Correlate with clinical                  context and patient history.    >=300 pg/mL - Heart Failure likely. Correlate with clinical                  context and patient history.     Biotin interference may cause falsely decreased results. Patients taking a Biotin dose of up to 5 mg/day should refrain from taking Biotin for 24 hours before sample  collection. Providers may contact their local laboratory for further information.   SERIAL TROPONIN-INITIAL - Normal    Troponin I, High Sensitivity 39      Narrative:     Less than 99th percentile of normal range cutoff-  Female and children under 18 years old <35 ng/L; Male <54 ng/L: Negative  Repeat testing should be performed if clinically indicated.     Female and children under 18 years old  ng/L; Male  ng/L:  Consistent  with possible cardiac damage and possible increased clinical   risk. Serial measurements may help to assess extent of myocardial damage.     >120 ng/L: Consistent with cardiac damage, increased clinical risk and  myocardial infarction. Serial measurements may help assess extent of   myocardial damage.      NOTE: Children less than 1 year old may have higher baseline troponin   levels and results should be interpreted in conjunction with the overall   clinical context.    NOTE: Troponin I testing is performed using a different   testing methodology at [facility] than at other   [facility]. Direct result comparisons should only   be made within the same method.     SERIAL TROPONIN, 1 HOUR - Normal    Troponin I, High Sensitivity 39      Narrative:     Less than 99th percentile of normal range cutoff-  Female and children under 18 years old <35 ng/L; Male <54 ng/L: Negative  Repeat testing should be performed if clinically indicated.     Female and children under 18 years old  ng/L; Male  ng/L:  Consistent with possible cardiac damage and possible increased clinical   risk. Serial measurements may help to assess extent of myocardial damage.     >120 ng/L: Consistent with cardiac damage, increased clinical risk and  myocardial infarction. Serial measurements may help assess extent of   myocardial damage.      NOTE: Children less than 1 year old may have higher baseline troponin   levels and results should be interpreted in conjunction with the overall   clinical context.    NOTE: Troponin I testing is performed using a different   testing methodology at [facility] than at other   [facility]. Direct result comparisons should only   be made within the same method.     CREATINE KINASE - Normal    Creatine Kinase 73     TROPONIN SERIES- (INITIAL, 1 HR)    Narrative:     The following orders were created for panel order Troponin Series, (0, 1 HR).  Procedure                                Abnormality         Status                     ---------                               -----------         ------                     Troponin I, High Sensiti...[[id number]]  Normal              Final result               Troponin, High Sensitivi...[[id number]]  Normal              Final result                 Please view results for these tests on the individual orders.   URINALYSIS WITH REFLEX CULTURE AND MICROSCOPIC    Narrative:     The following orders were created for panel order Urinalysis with Reflex Culture and Microscopic.  Procedure                               Abnormality         Status                     ---------                               -----------         ------                     Urinalysis with Reflex C...[[id number]]  Abnormal            Final result               Extra Urine Gray Tube[[id number]]                            In process                   Please view results for these tests on the individual orders.   EXTRA URINE PARKS TUBE   CMV DNA, QUANTITATIVE, PCR, PLASMA   EBV PCR, QUANTITATIVE, PLASMA   BK VIRUS, PCR, QUANTITATIVE   TACROLIMUS   INFLUENZA A AND B PCR   SARS-COV-2 PCR, SCREEN ASYMPTOMATIC   RSV PCR   CBC WITH AUTO DIFFERENTIAL   RENAL FUNCTION PANEL   MAGNESIUM   URINALYSIS MICROSCOPIC WITH REFLEX CULTURE    WBC, Urine 1-5      RBC, Urine NONE      Mucus, Urine 1+       XR chest 2 views   Final Result   1.  No evidence of acute cardiopulmonary process.        I personally reviewed the images/study and I agree with the findings   as stated by Dr. Aleksey Navarrete. This study was interpreted at   [facility], [place], [place].        MACRO:   None        Signed by: Yvon De Oliveira 1/16/2024 3:33 PM   Dictation workstation:   [id number]       renal complete    (Results Pending)         ED Course & MDM   Diagnoses as of 01/16/24 2007   Renal function impairment   Other chest pain       Medical Decision Making:  This is a 68-year-old male  with history of IDDM, HTN, and ESRD status post renal transplant most recently in 2013, currently being worked out outpatient for impaired allograft function and CKD 3, presenting to the ED with oliguria and fluid overload.  Patient arrives hemodynamically stable and not in acute distress.  Does appear fluid overloaded on exam.  Initial report was for urinary retention therefore bladder scan obtained and showed no signs of urinary retention.  Concern for graft dysfunction as cause for leukorrhea.  Labs notable for worsening ISIAH and elevated BNP.  Concern for cardiorenal syndrome.  EKG negative for any acute ischemic change and troponin negative x 2, do not suspect ACS.  Patient given his home antihypertensives that he had at bedside.  Patient ultimately required admission for further workup and management, nephrology and cardiology consults.  Agreeable to plan admitted in stable condition.      Social Determinants Limiting Care:  None identified    Disposition:  As a result of their workup, the patient will require admission to the hospital.  The patient was informed of their diagnosis.  Patient was given the opportunity to ask questions and answered them.  Patient agreed to be admitted to the hospital.    Patient seen and discussed with attending physician.    Wilma Vidal MD PGY3  Emergency Medicine      Procedures ? SmartLinks last updated 1/16/2024 8:07 PM          Wilma Vidal MD  Resident  01/16/24 2012

## 2024-01-17 NOTE — SIGNIFICANT EVENT
Senior Staffing Note    69 y/o M PMH prostate cancer (s/p radical prostatectomy in 2013), Hepatitis C (s/p Harvoni with undetectable viral load in 2019), HTN, T2DM, ESRD (s/p 2x renal transplant 1992, 2013 in [facility]) now with impaired allograft function (baseline Cr ~3) on chronic immunosuppression, IgG and IgA Lambda MGUS (s/p bone marrow biopsy 10/26/23) presenting for full body pain and decreasing urine output.    See M4 note for detailed HPI.    Briefly, history is taken from patient's partner, Amalia at bedside as patient was in too much pain to talk. She reports that Mr. Bashir has had several hospitalizations since at least fall of 2023 for multiple issues, but most of them are related to his kidneys. She said that he has had some chronic back pain for the last few months that kind of waxes and wanes but it became so bad this morning that he couldn't take his pills and decided to present to the ED.    He has noticed decreased urine output. No nausea, vomiting, or diarrhea. He does have severe muscle aches. No recent travel or sick contacts. No falls recently. He has had not had poor PO intake. He was able to have a sandwich in the ED today. He has severe pain which is making him moan which is located in his inguinal canals and radiates up his back.    In the ED, vitals with: T 36.5, /72, HR 68, SpO2 97% on room air.    Labs with:  CBC: WBC 3.1 (L- baseline ~3-4), Hb 9.7 (L- baseline ~9-10), MCV 80 (L- baseline ~80s), Plt 111 (L- baseline ~100-110s)  RFP: Na 140 (N), K 4.4 (N), Cl 109 (H), HCO3 25 (N), BUN 56 (H), Cr 4.11 (H- baseline lately ~3-3.5 (H)), Glucose 202 (H)  LFTs: AST 12 (N), ALT 15 (N), Alk phos 46 (N), Tbili 0.2 (N), Total protein 4.6 (L)   (H- last BNP 12/16/23 was 298 (H))  Troponin 39 (N) -> 39 (N)  UA with 3+ protein, negative nitrite, negative leukocyte esterase    CXR: No evidence of acute cardiopulmonary process    EKG: Normal sinus rhythm, QTc 443    ED interventions: Dilaudid  0.5 mg IV x1, 1L LR    Other relevant data:    Seen by cardiology on 1/9/24:  - He was seen for follow up after recent ED visit where he presented for hypertension and chest pain in the setting of his Losartan being held on discharge 1/6/24. Cardiologist ordered a stress cardiac MRI to evaluate his chest pain and assess his severe LVH to rule out infiltration. At this visit his cardiologist started Aspirin 81 mg daily.  - Last echo: 11/28/23: EF 60-65%, no regional wall motion abnormalities. LV cavity size is moderately dilated. LV septal wall thickness is mildly increased. Moderately increased LV posterior wall thickness. Severe concentric LV hypertrophy. LV diastolic filling not assessed. LA moderately dilated. RA mildly dilated. Mild to moderate AR. Mild MR. As compared to TTE 6/21/2017- there is now moderate LV dysfunction and severe LVH.    Seen by transplant nephrology on 1/8/24:  - Pt has had 2 admission recently for ISIAH on CKD. hypervolemia.  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    - Pt was treated with ritux 1gm x2 doses. Tolerated infusions well.   - Started on Lokelma for hyperkalemia.  - Home BPs 150s-160s    Seen by heme/onc on 11/28/23:  - Pt was referred after recent hospitalization for abdominal pain for further evaluation of evolving pancytopenia.  History of anemia for several years treated with iron supplements, does not recall being told about abnormal blood counts until last few months.  He has not required transfusion support and denies any recent infections.  Upon reviewing his blood counts, he had worsening anemia (10-11 down to 7.4 g/dL) and platelets (200s down to 69K) over last 2-3 months.  He has had chronic lymphopenia but has not been neutropenic.  He complains predominantly of GI symptoms  including abdominal pain, nausea, and vomiting for which he has been hospitalized 2x recently without clear etiology.      Workup:   SPEP (10/18/23): 0.2g/dL IgA Lambda M protein, 0.1g/dL IgG Kappa M protein  SFLC (10/19/23): 8.23mg/dL Kappa FLC, 6.40mg/dL Lambda FLC, FLC ratio 1.29  Immunoglobulins (10/20/23): IgA 523  Spot UPEP (11/16/23): Marked proteinuria, 2% IgA Lambda M protein, 1% IgG Lambda M protein     BMBx (10/26/23): Consistent with MGUS  -- HYPERCELLULAR BONE MARROW (50%) WITH MATURING TRILINEAGE HEMATOPOIESIS AND INVOLVED (5%) BY PLASMA CELL NEOPLASM  FISH NEGATIVE for gain of 1q, hyperdiploidy of 3,7 and 11, rearrangement of IGH, and deletion of TP53      Osseous survey (11/19/23):  IMPRESSION:  1. No evidence of suspicious osteolytic lesion in the axial or appendicular skeleton.  2. Large amount of lobulated soft tissue prominence in the radialaspect of the left distal upper arm containing interrupted calcifications. This may represent fistula for hemodialysis and clinical correlation as well as physical examination suggested.  3. Prominent vascular calcifications of the extremities     MGUS:  - 3 M spikes found on workup for worsening pancytopenias: 0.2g/dL IgA Lambda, 0.1g/dL IgG Lambda, 0.1g/dL IgG Kappa  - Both LC elevated w/ normal FLC ratio  - IgA 523  - BMBx showed 5% plasma cells, consistent w/ MGUS    PMH:  - T2DM  - HTN  - ESRD (s/p 2x renal transplant 1992, 2013 in [facility])  - Glomerulonephritis (11/2023) s/p Rituximab x 2  - IgG and IgA Lambda MGUS (s/p bone marrow biopsy 10/2023)  - Hepatitis C (s/p Harvoni with undetectable viral load in 2019)  - Prostate cancer s/p radical prostatectomy (2013)    Allergies: no known drug allergies    Meds:  - Aspirin 81 mg daily- recently prescribed 1/9/24 for chest pain by cardiologist while waiting for stress cardiac MRI  - Tacrolimus 2.5 mg BID- goal trough 5-8  - Prednisone 10 mg daily  - Bactrim single strength BID  - Imuran 50 mg daily  - Losartan  25 mg daily- on hold per outpt nephrology due to ISIAH and hyperkalemia  - Carvedilol 37.5 mg BID  - Amlodipine 10 mg daily  - Isosorbide mononitrate 60 mg daily  - Hydralazine 100 mg TID  - Ferrous sulfate 325 mg BID  - Lantus 24 units qAM  - Pravastatin 10 mg qHS  - Calcitriol 0.25 mcg daily  - Vitamin D 1000 units daily  - Cinacalcet 30 mg daily  - Lasix 40 mg BID  - Lokelma 5g daily    PSH:  - Radical prostatectomy 2013  - Ileostomy with ileostomy reversal 2017    FHx: did not discuss    Social Hx:  Alcohol- will discuss in AM  Cigs- will discuss in AM  Other drugs- will discuss in AM  Living situation- will discuss in AM    Physical Exam:  General appearance/Constitutional: mild distress secondary to pain, moaning, curled up in left lateral decubitus position  Eyes: sclera anicteric  Head & Neck: moist mucous membranes  Respiratory/Chest Wall: transmitted upper airway sounds bilaterally  Cardiovascular: regular rate and rhythm, warm extremities  Gastrointestinal: soft, nontender, distended  Neurologic: awake, alert, oriented to person, place, circumstance  Extremities: 2+ lower extremity edema  Lines/Drains: pIV    Assessment and Plan by Problem:   69 y/o M PMH prostate cancer (s/p radical prostatectomy in 2013), Hepatitis C (s/p Harvoni with undetectable viral load in 2019), HTN, T2DM, ESRD (s/p 2x renal transplant 1992, 2013 in [facility]) now with impaired allograft function (baseline Cr ~3) on chronic immunosuppression, IgG and IgA Lambda MGUS (s/p bone marrow biopsy 10/26/23) presenting for full body pain and decreasing urine output.      #Ogliguric ISIAH on CKD in setting of renal transplant  #ESRD s/p 2x renal transplant (1992, 2013 in [facility])  #Glomerulonephritis in 11/2023  - Pt with multiple recent admissions for renal injury and hypERvolemia. Etiology is unclear at this point. Pt has had 2 admission recently for ISIAH on CKD. hypervolemia.  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 He was treated with Rituximab in late 2023.  - On 1/16- Cr 4.11 (H- baseline lately ~3-3.5 (H))  - DDx for his current presentation with renal failure includes graft dysfunction secondary to recurrent renal injury, tacro induced. Cardiorenal is possible as his last TTE 11/2023 had severe concentric LVH which most likely has impaired diastolic function although that was not seen on the TTE and his BNP is elevated in the 500s (H). It is unclear whether the kidney is losing function and that results in oliguria and volume overload or if there is a component of heart failure which is causing cardio renal injury.    Plan:  - Touched base with transplant nephrology with their prelim recs below:  - Lasix 60 mg IV x 1  - CMV, EBV, BK virus PCR  - Renal ultrasound  - Tacro level in AM  - Continue home Tacrolimus 2.5 mg BID, Prednisone 10 mg daily, and Imuran 50 mg daily per transplant nephrology recs  - Continue home Calcitriol 0.25 mcg daily, Vitamin D 1000 units daily, Cinacalcet 30 mg daily  - He is on Bactrim single strength tab BID at home presumably for PCP prophylaxis- will hold for now given renal dysfunction and discuss with transplant nephrology about when to resume  - Hold home Lasix 40 mg PO BID while we are using IV diuretics and hold home Lokelma 5 g daily for now while we see how his potassium does  - Continue Aspirin 81 mg daily (started 1/9/24 for chest pain)  - Regarding his cardiac evaluation, could consider getting stress cardiac MRI while inpt to evaluate heart for infiltrative processes as per cardiology's outpt plan if the cardia MRI isn't already scheduled    #Back and body pain  - His pain is not localizing to any specific area. Described as aching muscle pain diffusely, mostly in back and around inguinal areas.  - DDx is broad. It seems that he  does not have this same pain in his legs and mostly it is in his torso and abdomen.  - Most concerning would be an aortic dissection; however, per the heme onc notes, this pain has been going on since about November at least and patient's partner agrees that he has had similar pain for several months that has been waxing and waning but not ever going away but he is certainly at risk due to longstanding uncontrolled hypertension.  - Rhabdo comes to mind with muscular tenderness. CK ordered and was normal.  - It could be related to his progressive renal dysfunction.  - Other considerations given this time of year and his risk for infection include things like COVID or flu although this would not make sense for something that has been going on for several months.  - Other possibility includes adhesions from his prior surgeries? There is note that he had an ileostomy in the past (not sure what the indication was) which was eventually reversed. It is possible he has scar tissue in the abdomen which is causing referred pain to the back.  - Pathologic fracture in the back is possible given his history of prostate cancer (although this was resected via radical prostatectomy) or given history of renal issues which increases risk of osteopenia and mineral bone disease. His partner said he had no falls or injuries over the last several months which makes this less likely.  - Some of the above ddx would be visible on CT with IV contrast but risk of IV contrast making his transplanted kidney fail more outweighs the benefit at this time.    Plan:  - Will test for flu/covid/rsv  - In AM will get better history about bowel movements, etc  - For pain overnight: will try a dose of Cyclobenzaprine 5 mg now to see if a muscle relaxer will help his musculoskeletal pain.  - For continued pain, will do Tylenol 650 mg q6 PRN for mild/moderate pain or headache, Oxycodone 5 mg q4 PRN for severe pain. Given we do not know what we are  treating, I am hesitant to give too much pain medication but NSAIDS are obviously not an option and he appears to be in distress from pain so feel it is appropriate to be aggressive with pain tonight and reassess in the AM when we have more information.    #HTN  - Per pt, his blood pressure at home usually is about 150s-160s (this is corroborated in the recent cardiology note)  - BP in 190s on arrival here likely secondary to pain and not taking his home BP meds this morning due to not feeling well    Plan:  - Continue home Amlodipine 10 mg daily, Carvedilol 37.5 mg BID, Hydralazine 100 mg TID, Isosorbide mononitrate 60 mg daily  - Continue to hold Losartan for now but consider resuming when safe due to nephrotic range proteinuria    #T2DM  - Last A1c 6% on 10/26/23  - Home regimen: Lantus 24 units qAM    Plan:  - Will decrease home Lantus to 12 units qHS given that he missed his dose this AM and he seems like he won't be able to take much PO  - Mild SSI and hypoglycemia protocol while inpt  - Continue home Pravastatin 10 mg qHS    #Pancytopenia  #MGUS  - Pt seen by hematology in 11/2024, performed bone marrow biopsy on 10/26/23.  - 3 M spikes found on workup for worsening pancytopenias: 0.2g/dL IgA Lambda, 0.1g/dL IgG Lambda, 0.1g/dL IgG Kappa, Both LC elevated w/ normal FLC ratio, IgA 523, BMBx showed 5% plasma cells, consistent w/ MGUS    Plan:  - Continue to monitor  - Continue home Ferrous sulfate 325 mg BID    Fluids: 2L fluid restriction  Nutrition: low potassium diet  Access: pIV R wrist  DVT ppx: subQ Heparin  GI ppx: none indicated  Dispo: TBD, most likely home when ready  Surrogate Decision Maker if Needed: FULL CODE  CODE STATUS: Partner Amalia Enriquez [phone]    Pt to be staffed with attending in AM    Tammi Oden MD  PGY3 Internal Medicine

## 2024-01-17 NOTE — CONSULTS
Reason For Consult  H/o kidney transplant, immunosuppression management, hypervolemia    History Of Present Illness  Manolo Bashir is a 68 y.o.M with PMH ESRD (on HD [id number]), s/p 2x renal transplants (1992, 2013) now with impaired allograft function, CKD 4, on immunosuppression (pred, tac, azathioprine), h/o IgG and IgA lambda MGUS (recent hematologic eval including BMbx with no e/o myeloma), DM2, HTN, prostate cancer s/p radical prostatectomy, baseline urinary incontinence, HCV s/p rx (PCR neg 10/2023) .       Pt has had 2 admission recently for ISIAH on CKD. hypervolemia.  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  He was treated with ritux 1gm x2 doses. Tolerated infusions well.      Serum Cr remains gradually worsening over the past 2-3 months, 3-3.5 in 11/2023. Recent labs with Cr up to 3.8-4, also with hyperkalemia for which ARB was held. started on Lokelma 5 g/d.     Currently admitted with worsening edema, persistent LLQ pain and low back pain. Noted to be hypertensive.     Cr 4.1 at admission --> 3.9 this am. Metabolic indices acceptable. Last Up/Uc ~5 g/g.    S/p IV Lasix with good UOP. Renal US w/o obstruction.     ROS neg other than stated above.    Past Medical History  He has a past medical history of Chronic kidney disease, stage 3 unspecified (CMS/HCC) (09/26/2018), COVID-19 (06/18/2020), Diabetes (CMS/[facility]), Focal and segmental proliferative glomerulonephritis (12/23/2023), HTN (hypertension), Other long term (current) drug therapy (07/20/2021), Personal history of other diseases of the circulatory system, Personal history of other infectious and parasitic diseases (08/17/2015), Polyp, colonic (08/17/2023), Primary osteoarthritis of both ankles (08/17/2023), Tubular adenoma of colon (08/17/2023), and Unspecified kidney failure (08/17/2016).    Surgical History  He has a past  surgical history that includes Prostatectomy (10/11/2013); Ileostomy (04/25/2017); Other surgical history (04/21/2017); Ileostomy closure (08/17/2015); Other surgical history (08/17/2015); Other surgical history (08/17/2015); US guided percutaneous peritoneal or retroperitoneal fluid collection drainage (10/20/2022); transplant, kidney, open (1992); transplant, kidney, open (2013); and US guided percutaneous biopsy renal left (Left, 11/20/2023).     Social History  He reports that he has never smoked. He has never used smokeless tobacco. He reports current alcohol use. He reports current drug use. Drug: Oxycodone.    Family History  Family History   Problem Relation Name Age of Onset    Bone cancer Mother      Other (corona's sarcome of the bone marrow) Mother      Prostate cancer Father      Diabetes Other Family Hist     Hypertension Other Family Hist         Allergies  Patient has no known allergies.      Current Facility-Administered Medications:     acetaminophen (Tylenol) tablet 650 mg, 650 mg, oral, q6h PRN, Tammi Oden MD, 650 mg at 01/17/24 1252    amLODIPine (Norvasc) tablet 10 mg, 10 mg, oral, Daily, Tammi Oden MD, 10 mg at 01/17/24 0842    [START ON 1/18/2024] aspirin chewable tablet 81 mg, 81 mg, oral, Daily, Rosita Loera MD    azaTHIOprine (Imuran) tablet 50 mg, 50 mg, oral, Daily, Tammi Oden MD, 50 mg at 01/17/24 1023    calcitriol (Rocaltrol) capsule 0.25 mcg, 0.25 mcg, oral, Daily, Tammi Oden MD, 0.25 mcg at 01/17/24 1022    carvedilol (Coreg) tablet 37.5 mg, 37.5 mg, oral, BID, Tammi Oden MD, 37.5 mg at 01/17/24 0838    cholecalciferol (Vitamin D-3) tablet 1,000 Units, 1,000 Units, oral, Daily, Tammi Oden MD, 1,000 Units at 01/17/24 0838    cinacalcet (Sensipar) tablet 30 mg, 30 mg, oral, Daily, Tammi Oden MD, 30 mg at 01/17/24 0838    cyclobenzaprine (Flexeril) tablet 5 mg, 5 mg, oral, TID, Keaton House MD, 5 mg at 01/17/24 1429    dextrose 10 % in water  (D10W) infusion, 0.3 g/kg/hr, intravenous, Once PRN, Tammi Oden MD    dextrose 50 % injection 25 g, 25 g, intravenous, q15 min PRN, Tammi Oden MD    ferrous sulfate (325 mg ferrous sulfate) tablet 1 tablet, 65 mg of iron, oral, BID, Tammi Oden MD, 1 tablet at 01/17/24 0838    glucagon (Glucagen) injection 1 mg, 1 mg, intramuscular, q15 min PRN, Tammi Oden MD    hydrALAZINE (Apresoline) tablet 100 mg, 100 mg, oral, TID, Tammi Oden MD, 100 mg at 01/17/24 1429    insulin glargine (Lantus) injection 12 Units, 12 Units, subcutaneous, Nightly, Tammi Oden MD, 12 Units at 01/16/24 2209    insulin lispro (HumaLOG) injection 0-5 Units, 0-5 Units, subcutaneous, TID with meals, Tammi Oden MD, 1 Units at 01/17/24 1242    isosorbide mononitrate ER (Imdur) 24 hr tablet 60 mg, 60 mg, oral, Daily, Tammi Oden MD, 60 mg at 01/17/24 0838    polyethylene glycol (Glycolax, Miralax) packet 17 g, 17 g, oral, Daily, Tammi Oden MD, 17 g at 01/17/24 0838    pravastatin (Pravachol) tablet 10 mg, 10 mg, oral, Nightly, Tammi Oden MD    predniSONE (Deltasone) tablet 10 mg, 10 mg, oral, q AM, Tammi Oden MD, 10 mg at 01/17/24 0838    tacrolimus (Prograf) capsule 2.5 mg, 2.5 mg, oral, q12h ZOË, Tammi Oden MD, 2.5 mg at 01/17/24 0643    Current Outpatient Medications:     acetaminophen (Tylenol) 325 mg tablet, Take 3 tablets (975 mg) by mouth every 6 hours if needed (pain)., Disp: , Rfl:     amLODIPine (Norvasc) 10 mg tablet, Take 1 tablet (10 mg) by mouth once daily., Disp: , Rfl:     aspirin 81 mg chewable tablet, Chew 1 tablet (81 mg) once daily., Disp: 30 tablet, Rfl: 11    azaTHIOprine (Imuran) 50 mg tablet, Take 1 tablet (50 mg) by mouth once daily. Do not start before December 22, 2023., Disp: 30 tablet, Rfl: 2    calcitriol (Rocaltrol) 0.25 mcg capsule, Take 1 capsule (0.25 mcg) by mouth once daily. Do not start before December 22, 2023., Disp: 30 capsule, Rfl: 2    carvedilol  "(Coreg) 12.5 mg tablet, Take 3 tablets (37.5 mg) by mouth 2 times a day., Disp: 180 tablet, Rfl: 0    cholecalciferol (Vitamin D-3) 25 MCG (1000 UT) tablet, Take 1 tablet (1,000 Units) by mouth once daily. Do not start before December 22, 2023., Disp: 30 tablet, Rfl: 2    cinacalcet (Sensipar) 30 mg tablet, Take 1 tablet (30 mg) by mouth once daily. Take with food or shortly afer a meal. Swallow tablet whole; do not break or divide. Do not start before December 22, 2023., Disp: 30 tablet, Rfl: 2    clotrimazole (Mycelex) 10 mg dariel, Take 1 tablet (10 mg) by mouth 3 times a day., Disp: 90 tablet, Rfl: 0    FeroSuL tablet, Take 1 tablet by mouth 2 times a day., Disp: 180 tablet, Rfl: 0    furosemide (Lasix) 40 mg tablet, Take 1 tablet (40 mg) by mouth 2 times a day., Disp: 60 tablet, Rfl: 2    hydrALAZINE (Apresoline) 100 mg tablet, Take 1 tablet (100 mg) by mouth 3 times a day., Disp: 90 tablet, Rfl: 2    insulin glargine (Lantus U-100 Insulin) 100 unit/mL injection, Inject 24 Units under the skin once every 24 hours. Take as directed per insulin instructions., Disp: , Rfl:     insulin lispro (HumaLOG) 100 unit/mL injection, Inject under the skin 3 times a day with meals. 100-199 2 units 200-299 4 units 300-399 6 units, Disp: , Rfl:     isosorbide mononitrate ER (Imdur) 60 mg 24 hr tablet, Take 1 tablet (60 mg) by mouth once daily. Do not crush or chew. Do not start before December 22, 2023., Disp: 30 tablet, Rfl: 2    lancets (Unilet Super Thin Lancets) 30 gauge misc, USE TO TEST BLOOD SUGAR THREE TIMES A DAY, Disp: 300 each, Rfl: 0    multivitamin tablet, Take 1 tablet by mouth once daily., Disp: , Rfl:     ondansetron (Zofran) 4 mg tablet, Take 1 tablet (4 mg) by mouth every 8 hours if needed for nausea or vomiting., Disp: , Rfl:     pen needle, diabetic (TechLITE Pen Needle) 32 gauge x 5/32\" needle, 1 each 3 times a day., Disp: 100 each, Rfl: 11    pravastatin (Pravachol) 10 mg tablet, Take 1 tablet (10 mg) by " "mouth once daily at bedtime., Disp: 90 tablet, Rfl: 0    predniSONE (Deltasone) 5 mg tablet, Take 2 tablets (10 mg) by mouth once daily in the morning., Disp: 60 tablet, Rfl: 3    sodium zirconium cyclosilicate (Lokelma) 5 gram packet, Take 5 g by mouth once every 24 hours., Disp: 7 packet, Rfl: 0    sulfamethoxazole-trimethoprim (Bactrim) 400-80 mg tablet, Take 1 tablet by mouth 2 times a day., Disp: 60 tablet, Rfl: 5    tacrolimus (Protopic) 0.1 % ointment, Apply topically 2 times a day. (Patient not taking: Reported on 1/16/2024), Disp: 30 g, Rfl: 11    tacrolimus 0.5 mg capsule, Take 2.5 mg by mouth every 12 hours., Disp: 1 capsule, Rfl: 0    Unilet Super Thin Lancets 30 gauge misc, 1 each 3 times a day., Disp: 100 each, Rfl: 3       Physical Exam    Last Recorded Vitals  Blood pressure (!) 158/91, pulse 69, temperature 37 °C (98.6 °F), temperature source Oral, resp. rate 10, height 1.727 m (5' 8\"), weight 81.6 kg (180 lb), SpO2 95 %.    A&ox3, mild distress sec to back pain  MMM, no lesions  Lungs with diminished sounds at bases, bibasilar crackles+  Rrr, no r/g  Abd soft, nt, nd  Mild LLQ tenderness  Generalized 1+ edema b/l     Results for orders placed or performed during the hospital encounter of 01/16/24 (from the past 24 hour(s))   Urinalysis with Reflex Culture and Microscopic   Result Value Ref Range    Color, Urine Yellow Straw, Yellow    Appearance, Urine Clear Clear    Specific Gravity, Urine 1.017 1.005 - 1.035    pH, Urine 6.0 5.0, 5.5, 6.0, 6.5, 7.0, 7.5, 8.0    Protein, Urine >=500 (3+) (N) NEGATIVE mg/dL    Glucose, Urine 50 (1+) (A) NEGATIVE mg/dL    Blood, Urine NEGATIVE NEGATIVE    Ketones, Urine NEGATIVE NEGATIVE mg/dL    Bilirubin, Urine NEGATIVE NEGATIVE    Urobilinogen, Urine <2.0 <2.0 mg/dL    Nitrite, Urine NEGATIVE NEGATIVE    Leukocyte Esterase, Urine NEGATIVE NEGATIVE   Extra Urine Gray Tube   Result Value Ref Range    Extra Tube Hold for add-ons.    Urinalysis Microscopic   Result " Value Ref Range    WBC, Urine 1-5 1-5, NONE /HPF    RBC, Urine NONE NONE, 1-2, 3-5 /HPF    Mucus, Urine 1+ Reference range not established. /LPF   ECG 12 Lead   Result Value Ref Range    Ventricular Rate 66 BPM    Atrial Rate 66 BPM    MD Interval 176 ms    QRS Duration 130 ms    QT Interval 414 ms    QTC Calculation(Bazett) 434 ms    P Axis 78 degrees    R Axis -30 degrees    T Axis 46 degrees    QRS Count 11 beats    Q Onset 218 ms    P Onset 130 ms    P Offset 167 ms    T Offset 425 ms    QTC Fredericia 427 ms   Troponin I, High Sensitivity   Result Value Ref Range    Troponin I, High Sensitivity 38 0 - 53 ng/L   Influenza A, and B PCR   Result Value Ref Range    Flu A Result Not Detected Not Detected    Flu B Result Not Detected Not Detected   Sars-CoV-2 PCR, Screen Asymptomatic   Result Value Ref Range    Coronavirus 2019, PCR Not Detected Not Detected   RSV PCR   Result Value Ref Range    RSV PCR Not Detected Not Detected   POCT GLUCOSE   Result Value Ref Range    POCT Glucose 253 (H) 74 - 99 mg/dL   ECG 12 Lead   Result Value Ref Range    Ventricular Rate 69 BPM    Atrial Rate 69 BPM    MD Interval 174 ms    QRS Duration 96 ms    QT Interval 410 ms    QTC Calculation(Bazett) 439 ms    P Axis 79 degrees    R Axis 129 degrees    T Axis -4 degrees    QRS Count 12 beats    Q Onset 215 ms    P Onset 128 ms    P Offset 186 ms    T Offset 420 ms    QTC Fredericia 429 ms   BK Virus PCR, Quantitative   Result Value Ref Range    BK Virus PCR Log      BKV DNA Result Not Detected Not Detected   Tacrolimus level   Result Value Ref Range    Tacrolimus  13.4 <=15.0 ng/mL   CBC and Auto Differential   Result Value Ref Range    WBC 3.0 (L) 4.4 - 11.3 x10*3/uL    nRBC 0.0 0.0 - 0.0 /100 WBCs    RBC 3.20 (L) 4.50 - 5.90 x10*6/uL    Hemoglobin 9.6 (L) 13.5 - 17.5 g/dL    Hematocrit 26.6 (L) 41.0 - 52.0 %    MCV 83 80 - 100 fL    MCH 30.0 26.0 - 34.0 pg    MCHC 36.1 (H) 32.0 - 36.0 g/dL    RDW 13.6 11.5 - 14.5 %    Platelets 98  (L) 150 - 450 x10*3/uL    Neutrophils % 72.3 40.0 - 80.0 %    Immature Granulocytes %, Automated 0.7 0.0 - 0.9 %    Lymphocytes % 21.3 13.0 - 44.0 %    Monocytes % 5.1 2.0 - 10.0 %    Eosinophils % 0.3 0.0 - 6.0 %    Basophils % 0.3 0.0 - 2.0 %    Neutrophils Absolute 2.14 1.20 - 7.70 x10*3/uL    Immature Granulocytes Absolute, Automated 0.02 0.00 - 0.70 x10*3/uL    Lymphocytes Absolute 0.63 (L) 1.20 - 4.80 x10*3/uL    Monocytes Absolute 0.15 0.10 - 1.00 x10*3/uL    Eosinophils Absolute 0.01 0.00 - 0.70 x10*3/uL    Basophils Absolute 0.01 0.00 - 0.10 x10*3/uL   Renal function panel   Result Value Ref Range    Glucose 264 (H) 74 - 99 mg/dL    Sodium 136 136 - 145 mmol/L    Potassium 5.2 3.5 - 5.3 mmol/L    Chloride 107 98 - 107 mmol/L    Bicarbonate 21 21 - 32 mmol/L    Anion Gap 13 10 - 20 mmol/L    Urea Nitrogen 56 (H) 6 - 23 mg/dL    Creatinine 3.93 (H) 0.50 - 1.30 mg/dL    eGFR 16 (L) >60 mL/min/1.73m*2    Calcium 8.5 (L) 8.6 - 10.6 mg/dL    Phosphorus 3.5 2.5 - 4.9 mg/dL    Albumin 2.4 (L) 3.4 - 5.0 g/dL   Magnesium   Result Value Ref Range    Magnesium 1.73 1.60 - 2.40 mg/dL   Heparin Assay, UFH   Result Value Ref Range    Heparin Unfractionated 0.5 See Comment Below for Therapeutic Ranges IU/mL   PST Top   Result Value Ref Range    Extra Tube Hold for add-ons.    POCT GLUCOSE   Result Value Ref Range    POCT Glucose 198 (H) 74 - 99 mg/dL   Electrocardiogram, 12-lead PRN ACS symptoms   Result Value Ref Range    Ventricular Rate 67 BPM    Atrial Rate 67 BPM    NM Interval 172 ms    QRS Duration 130 ms    QT Interval 422 ms    QTC Calculation(Bazett) 445 ms    P Axis 60 degrees    R Axis 99 degrees    T Axis 20 degrees    QRS Count 11 beats    Q Onset 219 ms    P Onset 133 ms    P Offset 190 ms    T Offset 430 ms    QTC Fredericia 438 ms   POCT GLUCOSE   Result Value Ref Range    POCT Glucose 152 (H) 74 - 99 mg/dL         Allograft biopsy: 11/20/2023  FINAL DIAGNOSIS   KIDNEY ALLOGRAFT, LEFT ILIAC FOSSA,  PERCUTANEOUS CORE BIOPSY:  -- LIMITED SAMPLE, PREDOMINANTLY MEDULLA  -- FOCAL CRESCENTIC GLOMERULONEPHRITIS  -- CHANGES SUGGESTIVE OF IMMUNE COMPLEX GLOMERULONEPHRITIS  -- SEVERE ARTERIOLAR HYALINOSIS AND ARTERIOSCLEROSIS   -- THICKENED GLOMERULAR BASEMENT MEMBRANES BY ELECTRON MICROSCOPY (SEE COMMENT)       Assessment/Plan     Mr. Bashir is a 68 y.o. male  who is here for follow up s/p kidney transplant.     TRANSPLANT DATE: 7/13/2013 (Kidney), 3/26/1994 (Kidney)        1. ESRD S/P kidney transplant   - impaired allograft function. Recent serum Cr ~4. Likely progressing to ESRD.  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   -Last urine protein/creatinine ratio is 5.22, recently taken off losartan 25 mg/d sec to ISIAH and hyperK. Cont to hold for now.   - hypervolemic on exam. Responding to IV Lasix 60mg, rpt another 60 mg dose today. would cont IV Lasix 40mg bid.   -   Given nephrotic range proteinuria, will consider restarting at lower dose once Cr stabilizes and hyperK controlled.  -avoid volume depletion  - Avoid nephrotoxic medications, NSAIDs, and IV contrast.     2. Immunosuppression  -Tacrolimus level last check was 13.4, does not appear to be a true trough. Pls rpt with am labs (30-60 mins prior to am dose). Goal Fk 5-8.   -Continue current immunosuppression regimen: tac, 2.5 mg bid pred 10mg/d, Imuran 50mg/d.      3. HTN urgency:   - BPs elevated. Will monitor response to diuresis. Can incr Imdur. Agree with switch to nifedipine.     4.. Electrolytes:  - hyperkalemia: ARB on hold. On Lokelma 5 g/d, can titrate dose as indic ated.   - other metabolic parameters acceptable.      5. Bone Mineral Disease/Osteoporosis  - Ca, phos acceptbale. PTH elevated 225 6/2023. Pls check with next labs.   - Consider DEXA every 2-3 years , defer to PCP     6.Anemia/Leukopenia:  - Hb 9.2, slightly down. On PO iron. Will start DARIANA if Hb <9 on rpt labs.   - borderline low WBC, 3k. Pls check CMV, BK,  EBV PCR. If WBC trends down further, will adjust anti-metabolite dose.        I spent 45 minutes in the professional and overall care of this patient      Raad Rolle MD

## 2024-01-17 NOTE — PROGRESS NOTES
Pharmacy Medication History Review    Manolo Bashir is a 68 y.o. male admitted for Renal function impairment. Pharmacy reviewed the patient's [id number] medications and allergies for accuracy.    The list below reflects the updated PTA list. Comments regarding how patient may be taking medications differently can be found in the Admit Orders Activity  Prior to Admission Medications   Prescriptions Last Dose Informant Patient Reported? Taking?   FeroSuL tablet 1/15/2024 Friend Yes Yes   Sig: Take 1 tablet by mouth 2 times a day.      acetaminophen (Tylenol) 325 mg tablet  Friend Yes Yes   Sig: Take 3 tablets (975 mg) by mouth every 6 hours   if needed (pain).   amLODIPine (Norvasc) 10 mg tablet 1/15/2024 Friend Yes Yes   Sig: Take 1 tablet (10 mg) by mouth once daily.   aspirin 81 mg chewable tablet Unknown Friend Yes Yes   Sig: Chew 1 tablet (81 mg) once daily.   azaTHIOprine (Imuran) 50 mg tablet 1/15/2024 Friend Yes Yes   Sig: Take 1 tablet (50 mg) by mouth once daily.    calcitriol (Rocaltrol) 0.25 mcg capsule 1/15/2024 Friend Yes Yes   Sig: Take 1 capsule (0.25 mcg) by mouth once daily.    carvedilol (Coreg) 12.5 mg tablet 1/15/2024 Friend Yes Yes   Sig: Take 3 tablets (37.5 mg) by mouth 2 times a day.   cholecalciferol (Vitamin D-3) 25 MCG (1000 UT) tablet 1/15/2024 Friend Yes Yes   Sig: Take 1 tablet (1,000 Units) by mouth once daily.    cinacalcet (Sensipar) 30 mg tablet 1/15/2024 Friend Yes Yes   Sig: Take 1 tablet (30 mg) by mouth once daily.   Take with food or shortly after a meal. Swallow tablet whole;   do not break or divide.    clotrimazole (Mycelex) 10 mg dariel Unknown Friend Yes Yes   Sig: Take 1 tablet (10 mg) by mouth 3 times a day.   furosemide (Lasix) 40 mg tablet 1/15/2024 Friend Yes Yes   Sig: Take 1 tablet (40 mg) by mouth 2 times a day.   hydrALAZINE (Apresoline) 100 mg tablet 1/15/2024 Friend Yes Yes   Sig: Take 1 tablet (100 mg) by mouth 3 times a day.   insulin glargine (Lantus  U-100 Insulin) 100 unit/mL injection 1/15/2024 Friend Yes Yes   Sig: Inject 24 Units under the skin once every 24 hours.   Take as directed per insulin instructions.   insulin lispro (HumaLOG) 100 unit/mL injection 1/15/2024 Friend Yes Yes   Sig: Inject under the skin 3 times a day with meals.   100-199 2 units 200-299 4 units 300-399 6 units   isosorbide mononitrate ER (Imdur) 60 mg 24 hr tablet 1/15/2024 Friend Yes Yes   Sig: Take 1 tablet (60 mg) by mouth once daily. Do not crush or chew. .      multivitamin tablet  Friend Yes Yes   Sig: Take 1 tablet by mouth once daily.   ondansetron (Zofran) 4 mg tablet  Friend Yes Yes   Sig: Take 1 tablet (4 mg) by mouth every 8 hours   if needed for nausea or vomiting.      pravastatin (Pravachol) 10 mg tablet 1/15/2024 Friend Yes Yes   Sig: Take 1 tablet (10 mg) by mouth once daily at bedtime.   predniSONE (Deltasone) 5 mg tablet 1/15/2024 Friend Yes Yes   Sig: Take 2 tablets (10 mg) by mouth once daily in the morning.   sodium zirconium cyclosilicate (Lokelma) 5 gram packet 1/15/2024 Friend Yes Yes   Sig: Take 5 g by mouth once every 24 hours.   sulfamethoxazole-trimethoprim (Bactrim) 400-80 mg tablet 1/15/2024 Friend Yes Yes   Sig: Take 1 tablet by mouth 2 times a day.      tacrolimus 0.5 mg capsule 1/16/2024 Friend Yes Yes   Sig: Take 2.5 mg by mouth every 12 hours.      Facility-Administered Medications: None        The list below reflects the updated allergy list. Please review each documented allergy for additional clarification and justification.  Allergies  Reviewed by Matthew Witt CPhT on 1/16/2024   No Known Allergies         Patient accepts M2B at discharge. Pharmacy has been updated to [facility].    Sources used to complete the med history include:  Patient interviewed with friend at bedside who had patient current list of medications and confirmed what he is taking and also goes to appointments with patient   [facility] fill history  reviewed  [facility] Ambulatory medication list reviewed  Care Everywhere, [place] summarization of Note medication list reviewed  1/5/24  Hospital Discharge medication list reviewed     Below are additional concerns with the patient's PTA list.  None    ---------------------------------  Mattehw Witt CPhT  Transitions of Care Technician  Medication reconciliation complete  Please reach out via [facility] Secure Chat for questions,   or if no response call [employer] or [employer].  [facility]

## 2024-01-17 NOTE — PROGRESS NOTES
Manolo Bashir is a 68 y.o. male on day 1 of admission presenting with Renal function impairment.    Subjective   Hospital course:  Manolo Bashir is a 68 y.o. male with a PMHx of ESRD s/p 2x renal transplants (1992, 2013 in [facility], currently on prednisone, tacrolimus, azathioprine, last baseline Cr 2.5-2.8), IgG and IgA Lambda MGUS (s/p bone marrow biopsy 10/26/23), angina (last EF 60-65% 2/201), IDDM2 (last A1c 6.0), HTN, prostate cancer s/p radical prostatectomy, HCV (s/p Harvoni with undetectable viral load in 2019) presenting with generalized pain, oliguria, and ISIAH in the setting of hypertensive urgency.    In ED, initial workup significant for , glucose 202, creatinine 4.11, BUN 56, 2x neg trop, 3+ urine protein. ED gave patient given 1L LR and IV Dilaudid. Nephrology consulted and patient subsequently given 1x IV Lasix 60mg.    Interval History:  Night team was called about 3/10 chest pain. Repeat troponin still negative (now x3) and EKG without clear evidence of ischemia. Regardless, out of concern for possible ACS, patient was ASA and Plavix loaded.    Patient reports he is feeling better today overall. He says he urinated 3x overnight. He is still having some back pain, concentrated in the middle of his lower back, though he says it has improved somewhat with oxycodone PRN. He denies any dyspnea, dysuria, or chest pain.    Objective     Const: Well-nourished, Well-developed  Eyes: PERRL, no conjunctival injection, and symmetrical lids  HENMT: Atraumatic external nose and ears, Moist MM  Neck: Symmetric, trachea midline, No thyromegaly  CVS: +S1/S2, No murmurs, rubs, or gallops, Peripheral pulses 2+ and equal in all extremities, 2+ pitting edema bilateral LE  RESP: Unlabored respiratory effort, Clear to auscultation bilaterally  GI: Distended, nontender, No hepatosplenomegaly  MSK: Extremities w/o deformity or ttp, No cyanosis or clubbing  Skin: Warm, Dry, No rashes or lesions  Neuro: CNs II-XII  "grossly intact, Sensation grossly intact  Psych: Alert, & Oriented x3, Appropriate mood and affect     Last Recorded Vitals  Blood pressure (!) 158/91, pulse 69, temperature 37 °C (98.6 °F), temperature source Oral, resp. rate 10, height 1.727 m (5' 8\"), weight 81.6 kg (180 lb), SpO2 95 %.  Intake/Output last 3 Shifts:  No intake/output data recorded.    Relevant Results  Scheduled medications  amLODIPine, 10 mg, oral, Daily  [START ON 1/18/2024] aspirin, 81 mg, oral, Daily  azaTHIOprine, 50 mg, oral, Daily  calcitriol, 0.25 mcg, oral, Daily  carvedilol, 37.5 mg, oral, BID  cholecalciferol, 1,000 Units, oral, Daily  cinacalcet, 30 mg, oral, Daily  cyclobenzaprine, 5 mg, oral, TID  ferrous sulfate (325 mg ferrous sulfate), 65 mg of iron, oral, BID  hydrALAZINE, 100 mg, oral, TID  insulin glargine, 12 Units, subcutaneous, Nightly  insulin lispro, 0-5 Units, subcutaneous, TID with meals  isosorbide mononitrate ER, 60 mg, oral, Daily  polyethylene glycol, 17 g, oral, Daily  pravastatin, 10 mg, oral, Nightly  predniSONE, 10 mg, oral, q AM  tacrolimus, 2.5 mg, oral, q12h ZOË      Results for orders placed or performed during the hospital encounter of 01/16/24 (from the past 24 hour(s))   Troponin, High Sensitivity, 1 Hour   Result Value Ref Range    Troponin I, High Sensitivity 39 0 - 53 ng/L   Urinalysis with Reflex Culture and Microscopic   Result Value Ref Range    Color, Urine Yellow Straw, Yellow    Appearance, Urine Clear Clear    Specific Gravity, Urine 1.017 1.005 - 1.035    pH, Urine 6.0 5.0, 5.5, 6.0, 6.5, 7.0, 7.5, 8.0    Protein, Urine >=500 (3+) (N) NEGATIVE mg/dL    Glucose, Urine 50 (1+) (A) NEGATIVE mg/dL    Blood, Urine NEGATIVE NEGATIVE    Ketones, Urine NEGATIVE NEGATIVE mg/dL    Bilirubin, Urine NEGATIVE NEGATIVE    Urobilinogen, Urine <2.0 <2.0 mg/dL    Nitrite, Urine NEGATIVE NEGATIVE    Leukocyte Esterase, Urine NEGATIVE NEGATIVE   Extra Urine Gray Tube   Result Value Ref Range    Extra Tube Hold " for add-ons.    Urinalysis Microscopic   Result Value Ref Range    WBC, Urine 1-5 1-5, NONE /HPF    RBC, Urine NONE NONE, 1-2, 3-5 /HPF    Mucus, Urine 1+ Reference range not established. /LPF   ECG 12 Lead   Result Value Ref Range    Ventricular Rate 66 BPM    Atrial Rate 66 BPM    CA Interval 176 ms    QRS Duration 130 ms    QT Interval 414 ms    QTC Calculation(Bazett) 434 ms    P Axis 78 degrees    R Axis -30 degrees    T Axis 46 degrees    QRS Count 11 beats    Q Onset 218 ms    P Onset 130 ms    P Offset 167 ms    T Offset 425 ms    QTC Fredericia 427 ms   Troponin I, High Sensitivity   Result Value Ref Range    Troponin I, High Sensitivity 38 0 - 53 ng/L   Influenza A, and B PCR   Result Value Ref Range    Flu A Result Not Detected Not Detected    Flu B Result Not Detected Not Detected   Sars-CoV-2 PCR, Screen Asymptomatic   Result Value Ref Range    Coronavirus 2019, PCR Not Detected Not Detected   RSV PCR   Result Value Ref Range    RSV PCR Not Detected Not Detected   POCT GLUCOSE   Result Value Ref Range    POCT Glucose 253 (H) 74 - 99 mg/dL   ECG 12 Lead   Result Value Ref Range    Ventricular Rate 69 BPM    Atrial Rate 69 BPM    CA Interval 174 ms    QRS Duration 96 ms    QT Interval 410 ms    QTC Calculation(Bazett) 439 ms    P Axis 79 degrees    R Axis 129 degrees    T Axis -4 degrees    QRS Count 12 beats    Q Onset 215 ms    P Onset 128 ms    P Offset 186 ms    T Offset 420 ms    QTC Fredericia 429 ms   Tacrolimus level   Result Value Ref Range    Tacrolimus  13.4 <=15.0 ng/mL   CBC and Auto Differential   Result Value Ref Range    WBC 3.0 (L) 4.4 - 11.3 x10*3/uL    nRBC 0.0 0.0 - 0.0 /100 WBCs    RBC 3.20 (L) 4.50 - 5.90 x10*6/uL    Hemoglobin 9.6 (L) 13.5 - 17.5 g/dL    Hematocrit 26.6 (L) 41.0 - 52.0 %    MCV 83 80 - 100 fL    MCH 30.0 26.0 - 34.0 pg    MCHC 36.1 (H) 32.0 - 36.0 g/dL    RDW 13.6 11.5 - 14.5 %    Platelets 98 (L) 150 - 450 x10*3/uL    Neutrophils % 72.3 40.0 - 80.0 %    Immature  Granulocytes %, Automated 0.7 0.0 - 0.9 %    Lymphocytes % 21.3 13.0 - 44.0 %    Monocytes % 5.1 2.0 - 10.0 %    Eosinophils % 0.3 0.0 - 6.0 %    Basophils % 0.3 0.0 - 2.0 %    Neutrophils Absolute 2.14 1.20 - 7.70 x10*3/uL    Immature Granulocytes Absolute, Automated 0.02 0.00 - 0.70 x10*3/uL    Lymphocytes Absolute 0.63 (L) 1.20 - 4.80 x10*3/uL    Monocytes Absolute 0.15 0.10 - 1.00 x10*3/uL    Eosinophils Absolute 0.01 0.00 - 0.70 x10*3/uL    Basophils Absolute 0.01 0.00 - 0.10 x10*3/uL   Renal function panel   Result Value Ref Range    Glucose 264 (H) 74 - 99 mg/dL    Sodium 136 136 - 145 mmol/L    Potassium 5.2 3.5 - 5.3 mmol/L    Chloride 107 98 - 107 mmol/L    Bicarbonate 21 21 - 32 mmol/L    Anion Gap 13 10 - 20 mmol/L    Urea Nitrogen 56 (H) 6 - 23 mg/dL    Creatinine 3.93 (H) 0.50 - 1.30 mg/dL    eGFR 16 (L) >60 mL/min/1.73m*2    Calcium 8.5 (L) 8.6 - 10.6 mg/dL    Phosphorus 3.5 2.5 - 4.9 mg/dL    Albumin 2.4 (L) 3.4 - 5.0 g/dL   Magnesium   Result Value Ref Range    Magnesium 1.73 1.60 - 2.40 mg/dL   Heparin Assay, UFH   Result Value Ref Range    Heparin Unfractionated 0.5 See Comment Below for Therapeutic Ranges IU/mL   PST Top   Result Value Ref Range    Extra Tube Hold for add-ons.    POCT GLUCOSE   Result Value Ref Range    POCT Glucose 198 (H) 74 - 99 mg/dL   Electrocardiogram, 12-lead PRN ACS symptoms   Result Value Ref Range    Ventricular Rate 67 BPM    Atrial Rate 67 BPM    FL Interval 172 ms    QRS Duration 130 ms    QT Interval 422 ms    QTC Calculation(Bazett) 445 ms    P Axis 60 degrees    R Axis 99 degrees    T Axis 20 degrees    QRS Count 11 beats    Q Onset 219 ms    P Onset 133 ms    P Offset 190 ms    T Offset 430 ms    QTC Fredericia 438 ms   POCT GLUCOSE   Result Value Ref Range    POCT Glucose 152 (H) 74 - 99 mg/dL        Assessment/Plan   Manolo Bashir is a 68 y.o. male with a PMHx of ESRD s/p 2x renal transplants (1992, 2013, currently on prednisone, tacrolimus, azathioprine, last  baseline Cr 2.5-2.8), MGUS, pancytopenia, angina (last EF 60-65% 2/201), IDDM2 (last A1c 6.0), HTN, prostate cancer s/p radical prostatectomy, HCV (treated and RNA not detected last 10/18/23) presenting with oliguria and ISIAH in the setting of hypertensive urgency. Patient is clearly volume overloaded on exam and labs. Kidney function is below baseline. Etiology likely cardiorenal: renal graft failure vs heart failure (HFpEF) with secondary renal dysfunction. Patient will be admitted with IV diuresis to reduce volume overload with renal US to help assess for graft functioning. Will speak with nephrology regarding recommendations for diuresis and anti-hypertensive given transplant history. Given resolution of chest pain, 3x negative trops, and no consistent ischemic EKG changes, very low likelihood of ACS at this time. Will not continue Plavix s/p loading dose.     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, severe arteriolar hyalinosis and arteriosclerosis > treated with Rituximab in late 2023  PLAN:  - Continue home Vit D, cinacalcet, iron  - qAM RFP, Mg, CBC  - Strict I/Os  - Daily weights  - Consulted nephrology, recommendations below:  - 1x Lasix 60mg today  - Renal US  - CMV, BK, EBV PCRs    # DM2  - Glucose 200 on admission  - Last A1c 6.0 on 10/26/23  - Insulin dependent, on 24 units of lantus + SSI at home  PLAN:  - Continue 12 units lantus qHS  - SSI + Accucheks  - Hypoglycemia protocol  - Continue pravastatin 10mg qHS     # Chronic hypertension  # Hypertensive urgency  # Angina  - Presented with BP>180s, baseline is ~140s  - Home meds: ASA 81mg,  coreg 37.5mg BID, amlodipine 10mg daily, hydralazine 100mg TID, imdur 60 daily  PLAN:  - Resume home antihypertensive regimen  - Stop home amlodipine 10mg daily  - Start nifedipine 60mg daily  - Increase dose imdur 90mg daily     # MGUS  # Immunosuppression  # Pancytopenia  - Low cell counts on admission: WBC 3.1, Hb 9.7, Plt 111  - Home meds: tacrolimus 2.5mg BID, prednisone 10mg daily, and azathioprine 50mg daily  PLAN:  - Resume immunosuppressive regimen  - AM Tacro level    # Back pain  - Unclear etiology, likely musculoskeletal given long-standing, relatively non-specific, and TTP  PLAN:  - Tylenol 650mg q6hr PRN  - Flexeril 5mg TID    Fluids: 2L fluid restriction  Nutrition: Potassium restricted diet  DVT ppx: subQ Heparin  Dispo: Likely home - pending improvement in symptoms  Surrogate Decision Maker if Needed: Amalia Enriquez [phone]   CODE STATUS: FULL CODE    Patient seen and discussed with Dr. Ojeda.     Patrice Anton MS4  1/17/24

## 2024-01-18 ENCOUNTER — APPOINTMENT (OUTPATIENT)
Dept: UROLOGY | Facility: CLINIC | Age: 69
End: 2024-01-18
Payer: COMMERCIAL

## 2024-01-18 ENCOUNTER — APPOINTMENT (OUTPATIENT)
Dept: ORTHOPEDIC SURGERY | Facility: CLINIC | Age: 69
End: 2024-01-18
Payer: COMMERCIAL

## 2024-01-18 LAB
ALBUMIN SERPL BCP-MCNC: 2.4 G/DL (ref 3.4–5)
ANION GAP SERPL CALC-SCNC: 10 MMOL/L (ref 10–20)
ATRIAL RATE: 56 BPM
BUN SERPL-MCNC: 58 MG/DL (ref 6–23)
CALCIUM SERPL-MCNC: 8.6 MG/DL (ref 8.6–10.6)
CHLORIDE SERPL-SCNC: 108 MMOL/L (ref 98–107)
CO2 SERPL-SCNC: 29 MMOL/L (ref 21–32)
CREAT SERPL-MCNC: 4.32 MG/DL (ref 0.5–1.3)
EGFRCR SERPLBLD CKD-EPI 2021: 14 ML/MIN/1.73M*2
GLUCOSE BLD MANUAL STRIP-MCNC: 114 MG/DL (ref 74–99)
GLUCOSE BLD MANUAL STRIP-MCNC: 121 MG/DL (ref 74–99)
GLUCOSE BLD MANUAL STRIP-MCNC: 190 MG/DL (ref 74–99)
GLUCOSE BLD MANUAL STRIP-MCNC: 341 MG/DL (ref 74–99)
GLUCOSE SERPL-MCNC: 100 MG/DL (ref 74–99)
MAGNESIUM SERPL-MCNC: 1.81 MG/DL (ref 1.6–2.4)
P AXIS: 88 DEGREES
P OFFSET: 178 MS
P ONSET: 126 MS
PHOSPHATE SERPL-MCNC: 3.1 MG/DL (ref 2.5–4.9)
POTASSIUM SERPL-SCNC: 4.5 MMOL/L (ref 3.5–5.3)
PR INTERVAL: 184 MS
PTH-INTACT SERPL-MCNC: 159.7 PG/ML (ref 18.5–88)
Q ONSET: 218 MS
QRS COUNT: 9 BEATS
QRS DURATION: 134 MS
QT INTERVAL: 430 MS
QTC CALCULATION(BAZETT): 414 MS
QTC FREDERICIA: 420 MS
R AXIS: -26 DEGREES
SODIUM SERPL-SCNC: 142 MMOL/L (ref 136–145)
T AXIS: 22 DEGREES
T OFFSET: 433 MS
VENTRICULAR RATE: 56 BPM

## 2024-01-18 PROCEDURE — 2500000001 HC RX 250 WO HCPCS SELF ADMINISTERED DRUGS (ALT 637 FOR MEDICARE OP)

## 2024-01-18 PROCEDURE — 80069 RENAL FUNCTION PANEL: CPT

## 2024-01-18 PROCEDURE — 82947 ASSAY GLUCOSE BLOOD QUANT: CPT

## 2024-01-18 PROCEDURE — 2500000001 HC RX 250 WO HCPCS SELF ADMINISTERED DRUGS (ALT 637 FOR MEDICARE OP): Performed by: STUDENT IN AN ORGANIZED HEALTH CARE EDUCATION/TRAINING PROGRAM

## 2024-01-18 PROCEDURE — 83970 ASSAY OF PARATHORMONE: CPT

## 2024-01-18 PROCEDURE — 99231 SBSQ HOSP IP/OBS SF/LOW 25: CPT | Performed by: INTERNAL MEDICINE

## 2024-01-18 PROCEDURE — 36415 COLL VENOUS BLD VENIPUNCTURE: CPT

## 2024-01-18 PROCEDURE — 83735 ASSAY OF MAGNESIUM: CPT

## 2024-01-18 PROCEDURE — 99232 SBSQ HOSP IP/OBS MODERATE 35: CPT | Performed by: STUDENT IN AN ORGANIZED HEALTH CARE EDUCATION/TRAINING PROGRAM

## 2024-01-18 PROCEDURE — 97161 PT EVAL LOW COMPLEX 20 MIN: CPT | Mod: GP

## 2024-01-18 PROCEDURE — 1100000001 HC PRIVATE ROOM DAILY

## 2024-01-18 PROCEDURE — 2500000004 HC RX 250 GENERAL PHARMACY W/ HCPCS (ALT 636 FOR OP/ED)

## 2024-01-18 PROCEDURE — 2500000004 HC RX 250 GENERAL PHARMACY W/ HCPCS (ALT 636 FOR OP/ED): Performed by: STUDENT IN AN ORGANIZED HEALTH CARE EDUCATION/TRAINING PROGRAM

## 2024-01-18 PROCEDURE — 2500000002 HC RX 250 W HCPCS SELF ADMINISTERED DRUGS (ALT 637 FOR MEDICARE OP, ALT 636 FOR OP/ED): Performed by: STUDENT IN AN ORGANIZED HEALTH CARE EDUCATION/TRAINING PROGRAM

## 2024-01-18 RX ORDER — INSULIN GLARGINE 100 [IU]/ML
15 INJECTION, SOLUTION SUBCUTANEOUS NIGHTLY
Status: DISCONTINUED | OUTPATIENT
Start: 2024-01-18 | End: 2024-01-18

## 2024-01-18 RX ORDER — INSULIN GLARGINE 100 [IU]/ML
20 INJECTION, SOLUTION SUBCUTANEOUS NIGHTLY
Status: DISCONTINUED | OUTPATIENT
Start: 2024-01-18 | End: 2024-01-20

## 2024-01-18 RX ORDER — PANTOPRAZOLE SODIUM 40 MG/1
40 TABLET, DELAYED RELEASE ORAL
Status: DISCONTINUED | OUTPATIENT
Start: 2024-01-19 | End: 2024-01-21 | Stop reason: HOSPADM

## 2024-01-18 RX ORDER — MAGNESIUM SULFATE HEPTAHYDRATE 40 MG/ML
2 INJECTION, SOLUTION INTRAVENOUS ONCE
Status: COMPLETED | OUTPATIENT
Start: 2024-01-18 | End: 2024-01-18

## 2024-01-18 RX ORDER — OXYCODONE HYDROCHLORIDE 5 MG/1
5 TABLET ORAL ONCE
Status: COMPLETED | OUTPATIENT
Start: 2024-01-18 | End: 2024-01-18

## 2024-01-18 RX ORDER — FUROSEMIDE 10 MG/ML
40 INJECTION INTRAMUSCULAR; INTRAVENOUS
Status: DISCONTINUED | OUTPATIENT
Start: 2024-01-18 | End: 2024-01-19

## 2024-01-18 RX ORDER — ISOSORBIDE MONONITRATE 60 MG/1
60 TABLET, EXTENDED RELEASE ORAL DAILY
Status: DISCONTINUED | OUTPATIENT
Start: 2024-01-18 | End: 2024-01-21 | Stop reason: HOSPADM

## 2024-01-18 RX ADMIN — CARVEDILOL 37.5 MG: 12.5 TABLET, FILM COATED ORAL at 20:47

## 2024-01-18 RX ADMIN — HYDRALAZINE HYDROCHLORIDE 100 MG: 50 TABLET ORAL at 15:28

## 2024-01-18 RX ADMIN — ISOSORBIDE MONONITRATE 60 MG: 60 TABLET, EXTENDED RELEASE ORAL at 08:50

## 2024-01-18 RX ADMIN — FERROUS SULFATE TAB 325 MG (65 MG ELEMENTAL FE) 1 TABLET: 325 (65 FE) TAB at 20:48

## 2024-01-18 RX ADMIN — FERROUS SULFATE TAB 325 MG (65 MG ELEMENTAL FE) 1 TABLET: 325 (65 FE) TAB at 08:50

## 2024-01-18 RX ADMIN — CYCLOBENZAPRINE 5 MG: 10 TABLET, FILM COATED ORAL at 15:28

## 2024-01-18 RX ADMIN — CYCLOBENZAPRINE 5 MG: 10 TABLET, FILM COATED ORAL at 08:51

## 2024-01-18 RX ADMIN — PRAVASTATIN SODIUM 10 MG: 20 TABLET ORAL at 02:26

## 2024-01-18 RX ADMIN — OXYCODONE HYDROCHLORIDE 5 MG: 5 TABLET ORAL at 04:42

## 2024-01-18 RX ADMIN — CYCLOBENZAPRINE 5 MG: 10 TABLET, FILM COATED ORAL at 20:47

## 2024-01-18 RX ADMIN — PRAVASTATIN SODIUM 10 MG: 20 TABLET ORAL at 20:47

## 2024-01-18 RX ADMIN — FUROSEMIDE 40 MG: 10 INJECTION, SOLUTION INTRAMUSCULAR; INTRAVENOUS at 15:28

## 2024-01-18 RX ADMIN — NIFEDIPINE 60 MG: 60 TABLET, FILM COATED, EXTENDED RELEASE ORAL at 06:00

## 2024-01-18 RX ADMIN — CARVEDILOL 37.5 MG: 12.5 TABLET, FILM COATED ORAL at 08:50

## 2024-01-18 RX ADMIN — TACROLIMUS 2.5 MG: 0.5 CAPSULE ORAL at 05:56

## 2024-01-18 RX ADMIN — ASPIRIN 81 MG CHEWABLE TABLET 81 MG: 81 TABLET CHEWABLE at 08:50

## 2024-01-18 RX ADMIN — INSULIN GLARGINE 20 UNITS: 100 INJECTION, SOLUTION SUBCUTANEOUS at 20:48

## 2024-01-18 RX ADMIN — HYDRALAZINE HYDROCHLORIDE 100 MG: 50 TABLET ORAL at 20:48

## 2024-01-18 RX ADMIN — CINACALCET 30 MG: 30 TABLET ORAL at 08:49

## 2024-01-18 RX ADMIN — TACROLIMUS 2.5 MG: 0.5 CAPSULE ORAL at 18:08

## 2024-01-18 RX ADMIN — HYDRALAZINE HYDROCHLORIDE 100 MG: 50 TABLET ORAL at 08:50

## 2024-01-18 RX ADMIN — AZATHIOPRINE 50 MG: 50 TABLET ORAL at 11:07

## 2024-01-18 RX ADMIN — PREDNISONE 10 MG: 20 TABLET ORAL at 08:50

## 2024-01-18 RX ADMIN — POLYETHYLENE GLYCOL 3350 17 G: 17 POWDER, FOR SOLUTION ORAL at 08:49

## 2024-01-18 RX ADMIN — Medication 1000 UNITS: at 08:50

## 2024-01-18 RX ADMIN — MAGNESIUM SULFATE 2 G: 2 INJECTION INTRAVENOUS at 13:11

## 2024-01-18 RX ADMIN — INSULIN LISPRO 4 UNITS: 100 INJECTION, SOLUTION INTRAVENOUS; SUBCUTANEOUS at 18:10

## 2024-01-18 SDOH — SOCIAL STABILITY: SOCIAL INSECURITY: DO YOU FEEL ANYONE HAS EXPLOITED OR TAKEN ADVANTAGE OF YOU FINANCIALLY OR OF YOUR PERSONAL PROPERTY?: NO

## 2024-01-18 SDOH — SOCIAL STABILITY: SOCIAL INSECURITY: ARE THERE ANY APPARENT SIGNS OF INJURIES/BEHAVIORS THAT COULD BE RELATED TO ABUSE/NEGLECT?: NO

## 2024-01-18 SDOH — SOCIAL STABILITY: SOCIAL INSECURITY: HAS ANYONE EVER THREATENED TO HURT YOUR FAMILY OR YOUR PETS?: NO

## 2024-01-18 SDOH — SOCIAL STABILITY: SOCIAL INSECURITY: ABUSE: ADULT

## 2024-01-18 SDOH — SOCIAL STABILITY: SOCIAL INSECURITY: DO YOU FEEL UNSAFE GOING BACK TO THE PLACE WHERE YOU ARE LIVING?: NO

## 2024-01-18 SDOH — SOCIAL STABILITY: SOCIAL INSECURITY: HAVE YOU HAD THOUGHTS OF HARMING ANYONE ELSE?: NO

## 2024-01-18 SDOH — SOCIAL STABILITY: SOCIAL INSECURITY: WERE YOU ABLE TO COMPLETE ALL THE BEHAVIORAL HEALTH SCREENINGS?: YES

## 2024-01-18 SDOH — SOCIAL STABILITY: SOCIAL INSECURITY: ARE YOU OR HAVE YOU BEEN THREATENED OR ABUSED PHYSICALLY, EMOTIONALLY, OR SEXUALLY BY ANYONE?: NO

## 2024-01-18 SDOH — SOCIAL STABILITY: SOCIAL INSECURITY: DOES ANYONE TRY TO KEEP YOU FROM HAVING/CONTACTING OTHER FRIENDS OR DOING THINGS OUTSIDE YOUR HOME?: NO

## 2024-01-18 ASSESSMENT — COGNITIVE AND FUNCTIONAL STATUS - GENERAL
MOVING FROM LYING ON BACK TO SITTING ON SIDE OF FLAT BED WITH BEDRAILS: A LITTLE
MOBILITY SCORE: 18
MOVING FROM LYING ON BACK TO SITTING ON SIDE OF FLAT BED WITH BEDRAILS: A LITTLE
WALKING IN HOSPITAL ROOM: A LITTLE
STANDING UP FROM CHAIR USING ARMS: A LITTLE
TURNING FROM BACK TO SIDE WHILE IN FLAT BAD: A LITTLE
DAILY ACTIVITIY SCORE: 21
MOVING TO AND FROM BED TO CHAIR: A LITTLE
PATIENT BASELINE BEDBOUND: NO
TURNING FROM BACK TO SIDE WHILE IN FLAT BAD: A LITTLE
HELP NEEDED FOR BATHING: A LITTLE
WALKING IN HOSPITAL ROOM: A LITTLE
DRESSING REGULAR UPPER BODY CLOTHING: A LITTLE
MOVING TO AND FROM BED TO CHAIR: A LITTLE
STANDING UP FROM CHAIR USING ARMS: A LITTLE
DRESSING REGULAR LOWER BODY CLOTHING: A LITTLE
CLIMB 3 TO 5 STEPS WITH RAILING: A LITTLE
CLIMB 3 TO 5 STEPS WITH RAILING: A LITTLE
MOBILITY SCORE: 18

## 2024-01-18 ASSESSMENT — LIFESTYLE VARIABLES
PRESCIPTION_ABUSE_PAST_12_MONTHS: NO
AUDIT-C TOTAL SCORE: -1
SUBSTANCE_ABUSE_PAST_12_MONTHS: NO
HOW OFTEN DO YOU HAVE A DRINK CONTAINING ALCOHOL: NEVER
SKIP TO QUESTIONS 9-10: 0
HOW MANY STANDARD DRINKS CONTAINING ALCOHOL DO YOU HAVE ON A TYPICAL DAY: PATIENT DECLINED
HOW OFTEN DO YOU HAVE 6 OR MORE DRINKS ON ONE OCCASION: PATIENT DECLINED
AUDIT-C TOTAL SCORE: -1

## 2024-01-18 ASSESSMENT — PAIN - FUNCTIONAL ASSESSMENT
PAIN_FUNCTIONAL_ASSESSMENT: 0-10
PAIN_FUNCTIONAL_ASSESSMENT: 0-10

## 2024-01-18 ASSESSMENT — PAIN SCALES - GENERAL
PAINLEVEL_OUTOF10: 0 - NO PAIN
PAINLEVEL_OUTOF10: 5 - MODERATE PAIN
PAINLEVEL_OUTOF10: 5 - MODERATE PAIN
PAINLEVEL_OUTOF10: 6

## 2024-01-18 ASSESSMENT — ACTIVITIES OF DAILY LIVING (ADL)
ADEQUATE_TO_COMPLETE_ADL: YES
TOILETING: INDEPENDENT
HEARING - LEFT EAR: FUNCTIONAL
JUDGMENT_ADEQUATE_SAFELY_COMPLETE_DAILY_ACTIVITIES: YES
DRESSING YOURSELF: INDEPENDENT
HEARING - RIGHT EAR: FUNCTIONAL
FEEDING YOURSELF: INDEPENDENT
PATIENT'S MEMORY ADEQUATE TO SAFELY COMPLETE DAILY ACTIVITIES?: YES
LACK_OF_TRANSPORTATION: PATIENT DECLINED
GROOMING: INDEPENDENT
WALKS IN HOME: INDEPENDENT
BATHING: INDEPENDENT

## 2024-01-18 ASSESSMENT — PAIN DESCRIPTION - LOCATION: LOCATION: BACK

## 2024-01-18 ASSESSMENT — PATIENT HEALTH QUESTIONNAIRE - PHQ9
2. FEELING DOWN, DEPRESSED OR HOPELESS: NOT AT ALL
SUM OF ALL RESPONSES TO PHQ9 QUESTIONS 1 & 2: 0
1. LITTLE INTEREST OR PLEASURE IN DOING THINGS: NOT AT ALL

## 2024-01-18 NOTE — CARE PLAN
The patient's goals for the shift include  Increase ambulation     The clinical goals for the shift include Increase ambulation     Over the shift, the patient did not make progress toward the following goals. Barriers to progression include unwillingness to ambulate. Recommendations to address these barriers include encouragement.

## 2024-01-18 NOTE — PROGRESS NOTES
Physical Therapy    Physical Therapy Evaluation    Patient Name: Manolo Bashir  MRN: [id number]  Today's Date: 1/18/2024   Time Calculation  Start Time: 1223  Stop Time: 1230  Time Calculation (min): 7 min    Assessment/Plan   PT Assessment  PT Assessment Results: Decreased strength, Impaired balance, Decreased mobility, Decreased coordination  Rehab Prognosis: Excellent  Evaluation/Treatment Tolerance: Patient tolerated treatment well  Medical Staff Made Aware: Yes  Strengths: Support of extended family/friends  Barriers to Participation: Comorbidities  End of Session Communication: Bedside nurse  Assessment Comment: Pt is a 68 YOM presenting with renal function impairment. Pt lethargic and sleepy but willing to work with PT. Pt able to ambulate 200ft x2 CGA and ascend/descend 4 steps CGA. Pt unsteady, and attributed it to lack of sleep. Pt to benefit from ongoing PT services for problems addressed. PT recommends low intensity PT upon D/C at this time.  End of Session Patient Position: Bed, 3 rail up, Alarm on  IP OR SWING BED PT PLAN  Inpatient or Swing Bed: Inpatient  PT Plan  Treatment/Interventions: Bed mobility, Transfer training, Gait training, Stair training, Balance training, Strengthening, Endurance training, Therapeutic activity, Therapeutic exercise, Home exercise program  PT Plan: Skilled PT  PT Frequency: 3 times per week  PT Discharge Recommendations: Low intensity level of continued care  PT Recommended Transfer Status: Assist x1, Contact guard  PT - OK to Discharge: Yes (Pt evaled and D/C recc made)      Subjective   General Visit Information:  General  Reason for Referral: Renal function impairment  Past Medical History Relevant to Rehab: ESRD s/p renal transplant (1992, 2013 (left kidney)), HTN, T2DM, CKD 4,  Family/Caregiver Present: No  Prior to Session Communication: Bedside nurse  Patient Position Received: Bed, 2 rail up, Alarm off, not on at start of session  Preferred Learning Style:  verbal  General Comment: Pt agreeable to PT this AM. Pt lethargic and sleepy . Pt unsteady with no overt LOB.  Home Living:  Home Living  Type of Home: House  Lives With: Adult children (daughter)  Home Adaptive Equipment: None  Home Layout: Multi-level  Home Access: Stairs to enter with rails  Entrance Stairs-Number of Steps: 4  Prior Level of Function:  Prior Function Per Pt/Caregiver Report  Level of [place]: Independent with ADLs and functional transfers, Independent with homemaking with ambulation  Precautions:  Precautions  Medical Precautions: Fall precautions  Vital Signs:       Objective   Pain:  Pain Assessment  Pain Assessment: 0-10  Pain Score: 6  Cognition:  Cognition  Overall Cognitive Status: Within Functional Limits  Orientation Level: Oriented X4  Processing Speed: Delayed (thergic and sleepy though)    General Assessments:    Activity Tolerance  Endurance: Endurance does not limit participation in activity    Sensation  Light Touch: No apparent deficits    Strength  Strength Comments: BLE grossly >= 3+/5 as seen by functional mobility  Strength  Strength Comments: BLE grossly >= 3+/5 as seen by functional mobility      Postural Control  Postural Control: Within Functional Limits    Static Sitting Balance  Static Sitting-Level of Assistance: Close supervision    Static Standing Balance  Static Standing-Level of Assistance: Contact guard  Functional Assessments:       Bed Mobility  Bed Mobility: Yes  Bed Mobility 1  Bed Mobility 1: Supine to sitting, Sitting to supine  Level of Assistance 1: Close supervision    Transfers  Transfer: Yes  Transfer 1  Transfer From 1: Sit to, Stand to  Transfer to 1: Sit  Technique 1: Sit to stand, Stand to sit  Transfer Level of Assistance 1: Close supervision  Trials/Comments 1: x1    Ambulation/Gait Training  Ambulation/Gait Training Performed: Yes  Ambulation/Gait Training 1  Surface 1: Level tile  Device 1: No device  Assistance 1: Contact guard, Moderate  verbal cues, Moderate tactile cues  Quality of Gait 1: Narrow base of support, Inconsistent stride length, Decreased step length, Listing  Comments/Distance (ft) 1: 200ft x2    Stairs  Stairs: Yes  Stairs  Rails 1: Bilateral  Device 1: Railing  Assistance 1: Contact guard, Minimal verbal cues  Comment/Number of Steps 1: 4 up/down (Pt descended the stairs sideways, stated it was due to his ankle.)       Extremity/Trunk Assessments:    Outcome Measures:  [facility] Basic Mobility  Turning from your back to your side while in a flat bed without using bedrails: A little  Moving from lying on your back to sitting on the side of a flat bed without using bedrails: A little  Moving to and from bed to chair (including a wheelchair): A little  Standing up from a chair using your arms (e.g. wheelchair or bedside chair): A little  To walk in hospital room: A little  Climbing 3-5 steps with railing: A little  Basic Mobility - Total Score: 18    Encounter Problems       Encounter Problems (Active)       Balance       Patient will maintain standing and sitting balance to allow for completion of daily activities       Start:  01/18/24    Expected End:  02/01/24               Mobility       Pt will ambulate 400ft indep to improve community distance ambulation safety.        Start:  01/18/24    Expected End:  02/01/24            Pt will ascend/descend 12 stairs Mod Indep with rails to improve functional safety to go home        Start:  01/18/24    Expected End:  02/01/24               Transfers       Pt will perform 5 STS indep to improve functional mobility       Start:  01/18/24    Expected End:  02/01/24                   Education Documentation  Body Mechanics, taught by MUSTAPHA Clark at 1/18/2024 12:45 PM.  Learner: Patient  Readiness: Acceptance  Method: Explanation  Response: Needs Reinforcement    Mobility Training, taught by MUSTAPHA Clark at 1/18/2024 12:45 PM.  Learner: Patient  Readiness: Acceptance  Method:  Explanation  Response: Needs Reinforcement    Education Comments  No comments found.

## 2024-01-18 NOTE — PROGRESS NOTES
Subjective   Hospital course:  Manolo Bashir is a 68 y.o. male with a PMHx of ESRD s/p 2x renal transplants (1992, 2013 in [facility], currently on prednisone, tacrolimus, azathioprine, last baseline Cr 2.5-2.8), IgG and IgA Lambda MGUS (s/p bone marrow biopsy 10/26/23), angina (last EF 60-65% 2/201), IDDM2 (last A1c 6.0), HTN, prostate cancer s/p radical prostatectomy, HCV (s/p Harvoni with undetectable viral load in 2019) presenting with generalized pain, oliguria, and ISIAH in the setting of hypertensive urgency.     In ED, initial workup significant for , glucose 202, creatinine 4.11, BUN 56, 2x neg trop, 3+ urine protein. ED gave patient given 1L LR and IV Dilaudid. Nephrology consulted and patient subsequently given 1x IV Lasix 60mg.    Interval History:  No acute events overnight. Patient reports feeling much better today. He believes that his swelling is starting to improve and is happy that is blood pressure is better under control. He still complains of some back pain but it is being reasonably managed with current pain regimen. He reports that he is urinating more frequently.     Objective     Vitals:    01/18/24 0700   BP: 166/77   Pulse: 63   Resp: 18   Temp: 37.1 °C (98.8 °F)   SpO2: 100%       I/O last 3 completed shifts:  In: 100 (1.2 mL/kg) [P.O.:100]  Out: 1275 (15.6 mL/kg) [Urine:1275 (0.4 mL/kg/hr)]  Weight: 81.6 kg   No intake/output data recorded.       Physical Exam  Const: Well-nourished, Well-developed  Eyes: PERRL, no conjunctival injection, and symmetrical lids  HENMT: Atraumatic external nose and ears, Moist MM  Neck: Symmetric, trachea midline, No thyromegaly  CVS: +S1/S2, No murmurs, rubs, or gallops, Peripheral pulses 2+ and equal in all extremities, 2+ pitting edema bilateral LE (improving)  RESP: Unlabored respiratory effort, Clear to auscultation bilaterally  GI: Distended, nontender, No hepatosplenomegaly  MSK: Extremities w/o deformity or ttp, No cyanosis or clubbing  Skin: Warm,  Dry, No rashes or lesions  Neuro: CNs II-XII grossly intact, Sensation grossly intact  Psych: Alert, & Oriented x3, Appropriate mood and affect        Scheduled medications  aspirin, 81 mg, oral, Daily  azaTHIOprine, 50 mg, oral, Daily  calcitriol, 0.25 mcg, oral, Daily  carvedilol, 37.5 mg, oral, BID  cholecalciferol, 1,000 Units, oral, Daily  cinacalcet, 30 mg, oral, Daily  cyclobenzaprine, 5 mg, oral, TID  ferrous sulfate (325 mg ferrous sulfate), 65 mg of iron, oral, BID  furosemide, 40 mg, intravenous, BID  hydrALAZINE, 100 mg, oral, TID  insulin glargine, 12 Units, subcutaneous, Nightly  insulin lispro, 0-5 Units, subcutaneous, TID with meals  isosorbide mononitrate ER, 60 mg, oral, Daily  magnesium sulfate, 2 g, intravenous, Once  NIFEdipine ER, 60 mg, oral, Daily before breakfast  [START ON 1/19/2024] pantoprazole, 40 mg, oral, Daily before breakfast  polyethylene glycol, 17 g, oral, Daily  pravastatin, 10 mg, oral, Nightly  predniSONE, 10 mg, oral, q AM  tacrolimus, 2.5 mg, oral, q12h ZOË      Continuous medications     PRN medications  PRN medications: acetaminophen, dextrose 10 % in water (D10W), dextrose, glucagon    Results for orders placed or performed during the hospital encounter of 01/16/24 (from the past 24 hour(s))   ECG 12 lead   Result Value Ref Range    Ventricular Rate 56 BPM    Atrial Rate 56 BPM    NJ Interval 184 ms    QRS Duration 134 ms    QT Interval 430 ms    QTC Calculation(Bazett) 414 ms    P Axis 88 degrees    R Axis -26 degrees    T Axis 22 degrees    QRS Count 9 beats    Q Onset 218 ms    P Onset 126 ms    P Offset 178 ms    T Offset 433 ms    QTC Fredericia 420 ms   Renal Function Panel   Result Value Ref Range    Glucose 148 (H) 74 - 99 mg/dL    Sodium 138 136 - 145 mmol/L    Potassium 4.8 3.5 - 5.3 mmol/L    Chloride 107 98 - 107 mmol/L    Bicarbonate 27 21 - 32 mmol/L    Anion Gap 9 (L) 10 - 20 mmol/L    Urea Nitrogen 52 (H) 6 - 23 mg/dL    Creatinine 4.04 (H) 0.50 - 1.30  mg/dL    eGFR 15 (L) >60 mL/min/1.73m*2    Calcium 8.5 (L) 8.6 - 10.6 mg/dL    Phosphorus 3.2 2.5 - 4.9 mg/dL    Albumin 2.6 (L) 3.4 - 5.0 g/dL   Magnesium   Result Value Ref Range    Magnesium 1.73 1.60 - 2.40 mg/dL   POCT GLUCOSE   Result Value Ref Range    POCT Glucose 147 (H) 74 - 99 mg/dL   POCT GLUCOSE   Result Value Ref Range    POCT Glucose 224 (H) 74 - 99 mg/dL   POCT GLUCOSE   Result Value Ref Range    POCT Glucose 121 (H) 74 - 99 mg/dL   POCT GLUCOSE   Result Value Ref Range    POCT Glucose 114 (H) 74 - 99 mg/dL   PTH, Intact   Result Value Ref Range    Parathyroid Hormone, Intact 159.7 (H) 18.5 - 88.0 pg/mL   POCT GLUCOSE   Result Value Ref Range    POCT Glucose 190 (H) 74 - 99 mg/dL         Manolo Bashir is a 68 y.o. male with a PMHx of ESRD s/p 2x renal transplants (1992, 2013, currently on prednisone, tacrolimus, azathioprine, last baseline Cr 2.5-2.8), MGUS, pancytopenia, angina (last EF 60-65% 2/201), IDDM2 (last A1c 6.0), HTN, prostate cancer s/p radical prostatectomy, HCV (treated and RNA not detected last 10/18/23) presenting with oliguria and ISIAH in the setting of hypertensive urgency. Patient is clearly volume overloaded on exam and labs. Kidney function is below baseline. Etiology likely cardiorenal: renal graft failure vs heart failure (HFpEF) with secondary renal dysfunction. Patient will be admitted with IV diuresis to reduce volume overload with renal US to help assess for graft functioning. Will speak with nephrology regarding recommendations for diuresis and anti-hypertensive given transplant history. Given resolution of chest pain, 3x negative trops, and no consistent ischemic EKG changes, very low likelihood of ACS at this time. Will not continue Plavix s/p loading dose.    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 Transplant kidney resistive indices remain near the upper limits of normal, similar to prior. No evidence of new hydronephrosis.  2. Small amount of nonspecific fluid is present along the transplanted kidney in the left iliac fossa, correlate with fluid volume status.  3. Nonobstructive renal calculi  - PTH elevated 225 6/2023 > 159.7 on 1/18  PLAN:  - Continue home Vit D, cinacalcet, iron  - qAM RFP, Mg, CBC  - Strict I/Os  - Daily weights  - Consulted nephrology, recommendations below:  - Lasix 40mg BID  - CMV, BK, EBV PCRs     # DM2  - Glucose 200 on admission  - Last A1c 6.0 on 10/26/23  - Insulin dependent, on 24 units of lantus + SSI at home  PLAN:  - Continue 12 units lantus qHS  - SSI + Accucheks  - Hypoglycemia protocol  - Continue pravastatin 10mg qHS     # Chronic hypertension  # Hypertensive urgency  # Angina  - Presented with BP>180s, baseline is ~140s  - Home meds: ASA 81mg, coreg 37.5mg BID, amlodipine 10mg daily, hydralazine 100mg TID, imdur 60 daily  PLAN:  - Goal BP <150 systolic  - Resume home antihypertensive regimen  - Stopped home amlodipine 10mg daily  - Continue nifedipine 60mg daily     # MGUS  # Immunosuppression  # Pancytopenia  - Low cell counts on admission: WBC 3.1, Hb 9.7, Plt 111  - Home meds: tacrolimus 2.5mg BID, prednisone 10mg daily, and azathioprine 50mg daily  - Tacro on 1/17 was 13.5  PLAN:  - Resume immunosuppressive regimen  - Recheck AM Tacro level     # Back pain  - Unclear etiology, likely musculoskeletal given long-standing, relatively non-specific, and TTP  PLAN:  - Tylenol 650mg q6hr PRN  - Flexeril 5mg TID     Fluids: 2L fluid restriction  Nutrition:  Potassium restricted diet  DVT ppx: subQ Heparin  Dispo: Likely home - pending improvement in symptoms  Surrogate Decision Maker if Needed: Amalia Enriquez [phone]   CODE STATUS: FULL CODE     Patient seen and discussed with Dr. Ojeda.     Patrice Anton MS4  1/18/24

## 2024-01-18 NOTE — PROGRESS NOTES
Transitional Care Coordinator   Met with patient and introduced myself as Care Coordinator and member of the discharge planning team.  Patient was admitted for impairment of renal function. He plans to return home at time of discharge with assistance from his daughter as needed. He was independent in ADL's prior to admission. Uses [facility] pharmacy. No home care needs were identified at this time. Will continue to follow for home going needs.  Ramonita Munoz RN

## 2024-01-18 NOTE — CARE PLAN
The patient's goals for the shift include      The clinical goals for the shift include sleep    Problem: Pain  Goal: My pain/discomfort is manageable  Outcome: Not Progressing     Problem: Safety  Goal: Patient will be injury free during hospitalization  Outcome: Not Progressing  Goal: I will remain free of falls  Outcome: Not Progressing     Problem: Daily Care  Goal: Daily care needs are met  Outcome: Not Progressing     Problem: Psychosocial Needs  Goal: Demonstrates ability to cope with hospitalization/illness  Outcome: Not Progressing  Goal: Collaborate with me, my family, and caregiver to identify my specific goals  Outcome: Not Progressing  Flowsheets (Taken 1/18/2024 0315)  Cultural Requests During Hospitalization: na  Spiritual Requests During Hospitalization: na     Problem: Discharge Barriers  Goal: My discharge needs are met  Outcome: Not Progressing     Problem: Pain  Goal: Takes deep breaths with improved pain control throughout the shift  Outcome: Not Progressing  Goal: Turns in bed with improved pain control throughout the shift  Outcome: Not Progressing  Goal: Walks with improved pain control throughout the shift  Outcome: Not Progressing  Goal: Performs ADL's with improved pain control throughout shift  Outcome: Not Progressing  Goal: Participates in PT with improved pain control throughout the shift  Outcome: Not Progressing  Goal: Free from opioid side effects throughout the shift  Outcome: Not Progressing  Goal: Free from acute confusion related to pain meds throughout the shift  Outcome: Not Progressing

## 2024-01-18 NOTE — CARE PLAN
The patient's goals for the shift include      The clinical goals for the shift include sleep    Problem: Psychosocial Needs  Goal: Collaborate with me, my family, and caregiver to identify my specific goals  Recent Flowsheet Documentation  Taken 1/18/2024 0315 by Judith Mcqueen RN  Cultural Requests During Hospitalization: [email or web address]  Spiritual Requests During Hospitalization: [email or web address]

## 2024-01-18 NOTE — PROGRESS NOTES
"Manolo Bashir is a 68 y.o. male on day 2 of admission presenting with Renal function impairment.    Subjective   Seen at bedside. Finally came out of ER at around 3 am today. C/o feeling tired.  LE edema improving slowly with IV diuresis.   Cr stable.    Objective     Last Recorded Vitals  Blood pressure 166/77, pulse 63, temperature 37.1 °C (98.8 °F), temperature source Temporal, resp. rate 18, height 1.727 m (5' 8\"), weight 81.6 kg (180 lb), SpO2 100 %.  Intake/Output last 3 Shifts:  I/O last 3 completed shifts:  In: 100 (1.2 mL/kg) [P.O.:100]  Out: 1275 (15.6 mL/kg) [Urine:1275 (0.4 mL/kg/hr)]  Weight: 81.6 kg     A&ox3, mild distress sec to back pain  MMM, no lesions  Lungs with diminished sounds at bases, bibasilar crackles+  Rrr, no r/g  Abd soft, nt, nd  Mild LLQ tenderness  Generalized 1+ edema     Results for orders placed or performed during the hospital encounter of 01/16/24 (from the past 24 hour(s))   ECG 12 lead   Result Value Ref Range    Ventricular Rate 56 BPM    Atrial Rate 56 BPM    CO Interval 184 ms    QRS Duration 134 ms    QT Interval 430 ms    QTC Calculation(Bazett) 414 ms    P Axis 88 degrees    R Axis -26 degrees    T Axis 22 degrees    QRS Count 9 beats    Q Onset 218 ms    P Onset 126 ms    P Offset 178 ms    T Offset 433 ms    QTC Fredericia 420 ms   Renal Function Panel   Result Value Ref Range    Glucose 148 (H) 74 - 99 mg/dL    Sodium 138 136 - 145 mmol/L    Potassium 4.8 3.5 - 5.3 mmol/L    Chloride 107 98 - 107 mmol/L    Bicarbonate 27 21 - 32 mmol/L    Anion Gap 9 (L) 10 - 20 mmol/L    Urea Nitrogen 52 (H) 6 - 23 mg/dL    Creatinine 4.04 (H) 0.50 - 1.30 mg/dL    eGFR 15 (L) >60 mL/min/1.73m*2    Calcium 8.5 (L) 8.6 - 10.6 mg/dL    Phosphorus 3.2 2.5 - 4.9 mg/dL    Albumin 2.6 (L) 3.4 - 5.0 g/dL   Magnesium   Result Value Ref Range    Magnesium 1.73 1.60 - 2.40 mg/dL   POCT GLUCOSE   Result Value Ref Range    POCT Glucose 147 (H) 74 - 99 mg/dL   POCT GLUCOSE   Result Value Ref " Range    POCT Glucose 224 (H) 74 - 99 mg/dL   POCT GLUCOSE   Result Value Ref Range    POCT Glucose 121 (H) 74 - 99 mg/dL   POCT GLUCOSE   Result Value Ref Range    POCT Glucose 114 (H) 74 - 99 mg/dL   PTH, Intact   Result Value Ref Range    Parathyroid Hormone, Intact 159.7 (H) 18.5 - 88.0 pg/mL   POCT GLUCOSE   Result Value Ref Range    POCT Glucose 190 (H) 74 - 99 mg/dL       Current Facility-Administered Medications:     acetaminophen (Tylenol) tablet 650 mg, 650 mg, oral, q6h PRN, Tammi Oden MD, 650 mg at 01/17/24 1252    aspirin chewable tablet 81 mg, 81 mg, oral, Daily, Rosita Loera MD, 81 mg at 01/18/24 0850    azaTHIOprine (Imuran) tablet 50 mg, 50 mg, oral, Daily, Tammi Oden MD, 50 mg at 01/18/24 1107    calcitriol (Rocaltrol) capsule 0.25 mcg, 0.25 mcg, oral, Daily, Tammi Oden MD, 0.25 mcg at 01/17/24 1022    carvedilol (Coreg) tablet 37.5 mg, 37.5 mg, oral, BID, Tammi Oden MD, 37.5 mg at 01/18/24 0850    cholecalciferol (Vitamin D-3) tablet 1,000 Units, 1,000 Units, oral, Daily, Tammi Oden MD, 1,000 Units at 01/18/24 0850    cinacalcet (Sensipar) tablet 30 mg, 30 mg, oral, Daily, Tammi Oden MD, 30 mg at 01/18/24 0849    cyclobenzaprine (Flexeril) tablet 5 mg, 5 mg, oral, TID, Keaton House MD, 5 mg at 01/18/24 0851    dextrose 10 % in water (D10W) infusion, 0.3 g/kg/hr, intravenous, Once PRN, Tammi Oden MD    dextrose 50 % injection 25 g, 25 g, intravenous, q15 min PRN, Tammi Oden MD    ferrous sulfate (325 mg ferrous sulfate) tablet 1 tablet, 65 mg of iron, oral, BID, Tammi Oden MD, 1 tablet at 01/18/24 0850    furosemide (Lasix) injection 40 mg, 40 mg, intravenous, BID, Sunitha Collado MD    glucagon (Glucagen) injection 1 mg, 1 mg, intramuscular, q15 min PRN, Tammi Oden MD    hydrALAZINE (Apresoline) tablet 100 mg, 100 mg, oral, TID, Tammi Oden MD, 100 mg at 01/18/24 0850    insulin glargine (Lantus) injection 12 Units, 12 Units,  subcutaneous, Nightly, Tammi Oden MD, 12 Units at 01/17/24 2132    insulin lispro (HumaLOG) injection 0-5 Units, 0-5 Units, subcutaneous, TID with meals, Tammi Oden MD, 1 Units at 01/17/24 1242    isosorbide mononitrate ER (Imdur) 24 hr tablet 60 mg, 60 mg, oral, Daily, Rosita Loera MD, 60 mg at 01/18/24 0850    magnesium sulfate IV 2 g, 2 g, intravenous, Once, Sunitha Collado MD, Last Rate: 25 mL/hr at 01/18/24 1311, 2 g at 01/18/24 1311    NIFEdipine ER (Adalat CC) 24 hr tablet 60 mg, 60 mg, oral, Daily before breakfast, Sunitha Collado MD, 60 mg at 01/18/24 0600    [START ON 1/19/2024] pantoprazole (ProtoNix) EC tablet 40 mg, 40 mg, oral, Daily before breakfast, Sunitha Collado MD    polyethylene glycol (Glycolax, Miralax) packet 17 g, 17 g, oral, Daily, Tammi Oden MD, 17 g at 01/18/24 0849    pravastatin (Pravachol) tablet 10 mg, 10 mg, oral, Nightly, Tammi Oden MD, 10 mg at 01/18/24 0226    predniSONE (Deltasone) tablet 10 mg, 10 mg, oral, q AM, Tammi Oden MD, 10 mg at 01/18/24 0850    tacrolimus (Prograf) capsule 2.5 mg, 2.5 mg, oral, q12h ZOË, Tammi Oden MD, 2.5 mg at 01/18/24 0556    Allograft biopsy: 11/20/2023  FINAL DIAGNOSIS   KIDNEY ALLOGRAFT, LEFT ILIAC FOSSA, PERCUTANEOUS CORE BIOPSY:  -- LIMITED SAMPLE, PREDOMINANTLY MEDULLA  -- FOCAL CRESCENTIC GLOMERULONEPHRITIS  -- CHANGES SUGGESTIVE OF IMMUNE COMPLEX GLOMERULONEPHRITIS  -- SEVERE ARTERIOLAR HYALINOSIS AND ARTERIOSCLEROSIS   -- THICKENED GLOMERULAR BASEMENT MEMBRANES BY ELECTRON MICROSCOPY (SEE COMMENT)         Assessment/Plan      Mr. Bashir is a 68 y.o. male  who is here for follow up s/p kidney transplant.     TRANSPLANT DATE: 7/13/2013 (Kidney), 3/26/1994 (Kidney)        1. ESRD S/P kidney transplant   - impaired allograft function. Recent serum Cr ~4. Likely progressing to ESRD.  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   -Last urine protein/creatinine ratio is 5.22,  recently taken off losartan 25 mg/d sec to ISIAH and hyperK. Cont to hold for now.   - hypervolemic on exam. Cont IV Lasix 40mg bid. avoid volume depletion  - Avoid nephrotoxic medications, NSAIDs, and IV contrast.     2. Immunosuppression  -Tacrolimus level last check was 13.4, does not appear to be a true trough. Pls check AM Fk trough with next labs. Goal Fk 5-8.   -Continue current immunosuppression regimen: tac, 2.5 mg bid pred 10mg/d, Imuran 50mg/d.      3. HTN urgency:   - BPs improving. Will monitor response to diuresis. Can incr nifedipine to 60 mg bid if needed. Cont other meds at current doses.     4.. Electrolytes:  - hyperkalemia: ARB on hold. was on Lokelma 5 g/d as outpt. Currently on hold. Serum K acceptable.   - other metabolic parameters acceptable.      5. Bone Mineral Disease/Osteoporosis  - Ca, phos acceptbale. PTH elevated 225 6/2023. Pls check with next labs.      6.Anemia/Leukopenia:  - Hb 9.2, slightly down. On PO iron. Will start DARIANA if Hb <9.   - borderline low WBC, 3k. CMV, BK, EBV PCR neg 1/17/24.     Rest of the management per primary team    Raad Rolle MD

## 2024-01-19 LAB
ALBUMIN SERPL BCP-MCNC: 2.5 G/DL (ref 3.4–5)
ANION GAP SERPL CALC-SCNC: 10 MMOL/L (ref 10–20)
ATRIAL RATE: 67 BPM
BUN SERPL-MCNC: 58 MG/DL (ref 6–23)
CALCIUM SERPL-MCNC: 8.5 MG/DL (ref 8.6–10.6)
CHLORIDE SERPL-SCNC: 106 MMOL/L (ref 98–107)
CO2 SERPL-SCNC: 27 MMOL/L (ref 21–32)
CREAT SERPL-MCNC: 4.17 MG/DL (ref 0.5–1.3)
EGFRCR SERPLBLD CKD-EPI 2021: 15 ML/MIN/1.73M*2
GLUCOSE BLD MANUAL STRIP-MCNC: 147 MG/DL (ref 74–99)
GLUCOSE BLD MANUAL STRIP-MCNC: 185 MG/DL (ref 74–99)
GLUCOSE BLD MANUAL STRIP-MCNC: 328 MG/DL (ref 74–99)
GLUCOSE BLD MANUAL STRIP-MCNC: 329 MG/DL (ref 74–99)
GLUCOSE SERPL-MCNC: 222 MG/DL (ref 74–99)
MAGNESIUM SERPL-MCNC: 2.05 MG/DL (ref 1.6–2.4)
P AXIS: 60 DEGREES
P OFFSET: 190 MS
P ONSET: 133 MS
PHOSPHATE SERPL-MCNC: 2.9 MG/DL (ref 2.5–4.9)
POTASSIUM SERPL-SCNC: 4.6 MMOL/L (ref 3.5–5.3)
PR INTERVAL: 172 MS
Q ONSET: 219 MS
QRS COUNT: 11 BEATS
QRS DURATION: 130 MS
QT INTERVAL: 422 MS
QTC CALCULATION(BAZETT): 445 MS
QTC FREDERICIA: 438 MS
R AXIS: 99 DEGREES
SODIUM SERPL-SCNC: 138 MMOL/L (ref 136–145)
T AXIS: 20 DEGREES
T OFFSET: 430 MS
TACROLIMUS BLD-MCNC: 5.8 NG/ML
VENTRICULAR RATE: 67 BPM

## 2024-01-19 PROCEDURE — 2500000004 HC RX 250 GENERAL PHARMACY W/ HCPCS (ALT 636 FOR OP/ED): Performed by: STUDENT IN AN ORGANIZED HEALTH CARE EDUCATION/TRAINING PROGRAM

## 2024-01-19 PROCEDURE — 2500000004 HC RX 250 GENERAL PHARMACY W/ HCPCS (ALT 636 FOR OP/ED)

## 2024-01-19 PROCEDURE — 2500000001 HC RX 250 WO HCPCS SELF ADMINISTERED DRUGS (ALT 637 FOR MEDICARE OP)

## 2024-01-19 PROCEDURE — 99232 SBSQ HOSP IP/OBS MODERATE 35: CPT | Performed by: STUDENT IN AN ORGANIZED HEALTH CARE EDUCATION/TRAINING PROGRAM

## 2024-01-19 PROCEDURE — 83735 ASSAY OF MAGNESIUM: CPT

## 2024-01-19 PROCEDURE — 80069 RENAL FUNCTION PANEL: CPT

## 2024-01-19 PROCEDURE — 97165 OT EVAL LOW COMPLEX 30 MIN: CPT | Mod: GO

## 2024-01-19 PROCEDURE — 2500000001 HC RX 250 WO HCPCS SELF ADMINISTERED DRUGS (ALT 637 FOR MEDICARE OP): Performed by: STUDENT IN AN ORGANIZED HEALTH CARE EDUCATION/TRAINING PROGRAM

## 2024-01-19 PROCEDURE — 82947 ASSAY GLUCOSE BLOOD QUANT: CPT

## 2024-01-19 PROCEDURE — 97116 GAIT TRAINING THERAPY: CPT | Mod: GP,CQ

## 2024-01-19 PROCEDURE — 2500000002 HC RX 250 W HCPCS SELF ADMINISTERED DRUGS (ALT 637 FOR MEDICARE OP, ALT 636 FOR OP/ED): Performed by: STUDENT IN AN ORGANIZED HEALTH CARE EDUCATION/TRAINING PROGRAM

## 2024-01-19 PROCEDURE — 36415 COLL VENOUS BLD VENIPUNCTURE: CPT

## 2024-01-19 PROCEDURE — 99231 SBSQ HOSP IP/OBS SF/LOW 25: CPT | Performed by: INTERNAL MEDICINE

## 2024-01-19 PROCEDURE — 2500000005 HC RX 250 GENERAL PHARMACY W/O HCPCS: Performed by: STUDENT IN AN ORGANIZED HEALTH CARE EDUCATION/TRAINING PROGRAM

## 2024-01-19 PROCEDURE — 1100000001 HC PRIVATE ROOM DAILY

## 2024-01-19 PROCEDURE — 80197 ASSAY OF TACROLIMUS: CPT

## 2024-01-19 RX ORDER — LIDOCAINE 560 MG/1
1 PATCH PERCUTANEOUS; TOPICAL; TRANSDERMAL DAILY
Status: DISCONTINUED | OUTPATIENT
Start: 2024-01-19 | End: 2024-01-19 | Stop reason: SDUPTHER

## 2024-01-19 RX ORDER — DICLOFENAC SODIUM 10 MG/G
4 GEL TOPICAL 4 TIMES DAILY PRN
Status: DISCONTINUED | OUTPATIENT
Start: 2024-01-19 | End: 2024-01-21 | Stop reason: HOSPADM

## 2024-01-19 RX ORDER — LIDOCAINE 560 MG/1
1 PATCH PERCUTANEOUS; TOPICAL; TRANSDERMAL DAILY
Status: DISCONTINUED | OUTPATIENT
Start: 2024-01-19 | End: 2024-01-21 | Stop reason: HOSPADM

## 2024-01-19 RX ORDER — TORSEMIDE 20 MG/1
40 TABLET ORAL
Status: DISCONTINUED | OUTPATIENT
Start: 2024-01-19 | End: 2024-01-21 | Stop reason: HOSPADM

## 2024-01-19 RX ADMIN — ASPIRIN 81 MG CHEWABLE TABLET 81 MG: 81 TABLET CHEWABLE at 08:49

## 2024-01-19 RX ADMIN — ACETAMINOPHEN 650 MG: 325 TABLET ORAL at 03:22

## 2024-01-19 RX ADMIN — PANTOPRAZOLE SODIUM 40 MG: 40 TABLET, DELAYED RELEASE ORAL at 06:17

## 2024-01-19 RX ADMIN — HYDRALAZINE HYDROCHLORIDE 100 MG: 50 TABLET ORAL at 15:54

## 2024-01-19 RX ADMIN — CYCLOBENZAPRINE 5 MG: 10 TABLET, FILM COATED ORAL at 15:54

## 2024-01-19 RX ADMIN — TORSEMIDE 40 MG: 20 TABLET ORAL at 17:31

## 2024-01-19 RX ADMIN — TACROLIMUS 2.5 MG: 0.5 CAPSULE ORAL at 17:31

## 2024-01-19 RX ADMIN — CARVEDILOL 37.5 MG: 12.5 TABLET, FILM COATED ORAL at 08:49

## 2024-01-19 RX ADMIN — PRAVASTATIN SODIUM 10 MG: 20 TABLET ORAL at 21:28

## 2024-01-19 RX ADMIN — INSULIN GLARGINE 20 UNITS: 100 INJECTION, SOLUTION SUBCUTANEOUS at 21:26

## 2024-01-19 RX ADMIN — FERROUS SULFATE TAB 325 MG (65 MG ELEMENTAL FE) 1 TABLET: 325 (65 FE) TAB at 21:28

## 2024-01-19 RX ADMIN — ISOSORBIDE MONONITRATE 60 MG: 60 TABLET, EXTENDED RELEASE ORAL at 08:49

## 2024-01-19 RX ADMIN — INSULIN LISPRO 4 UNITS: 100 INJECTION, SOLUTION INTRAVENOUS; SUBCUTANEOUS at 18:46

## 2024-01-19 RX ADMIN — LIDOCAINE 1 PATCH: 4 PATCH TOPICAL at 10:03

## 2024-01-19 RX ADMIN — CYCLOBENZAPRINE 5 MG: 10 TABLET, FILM COATED ORAL at 21:28

## 2024-01-19 RX ADMIN — POLYETHYLENE GLYCOL 3350 17 G: 17 POWDER, FOR SOLUTION ORAL at 08:47

## 2024-01-19 RX ADMIN — AZATHIOPRINE 50 MG: 50 TABLET ORAL at 08:51

## 2024-01-19 RX ADMIN — HYDRALAZINE HYDROCHLORIDE 100 MG: 50 TABLET ORAL at 08:49

## 2024-01-19 RX ADMIN — NIFEDIPINE 60 MG: 60 TABLET, FILM COATED, EXTENDED RELEASE ORAL at 06:17

## 2024-01-19 RX ADMIN — TACROLIMUS 2.5 MG: 0.5 CAPSULE ORAL at 06:18

## 2024-01-19 RX ADMIN — FUROSEMIDE 40 MG: 10 INJECTION, SOLUTION INTRAMUSCULAR; INTRAVENOUS at 08:51

## 2024-01-19 RX ADMIN — HYDRALAZINE HYDROCHLORIDE 100 MG: 50 TABLET ORAL at 21:28

## 2024-01-19 RX ADMIN — FERROUS SULFATE TAB 325 MG (65 MG ELEMENTAL FE) 1 TABLET: 325 (65 FE) TAB at 08:50

## 2024-01-19 RX ADMIN — CARVEDILOL 37.5 MG: 12.5 TABLET, FILM COATED ORAL at 21:28

## 2024-01-19 RX ADMIN — INSULIN LISPRO 1 UNITS: 100 INJECTION, SOLUTION INTRAVENOUS; SUBCUTANEOUS at 08:24

## 2024-01-19 RX ADMIN — CYCLOBENZAPRINE 5 MG: 10 TABLET, FILM COATED ORAL at 08:47

## 2024-01-19 RX ADMIN — PREDNISONE 10 MG: 20 TABLET ORAL at 08:50

## 2024-01-19 RX ADMIN — Medication 1000 UNITS: at 08:50

## 2024-01-19 RX ADMIN — CINACALCET 30 MG: 30 TABLET ORAL at 08:51

## 2024-01-19 RX ADMIN — CALCITRIOL CAPSULES 0.25 MCG 0.25 MCG: 0.25 CAPSULE ORAL at 08:47

## 2024-01-19 ASSESSMENT — PAIN - FUNCTIONAL ASSESSMENT
PAIN_FUNCTIONAL_ASSESSMENT: 0-10
PAIN_FUNCTIONAL_ASSESSMENT: 0-10

## 2024-01-19 ASSESSMENT — COGNITIVE AND FUNCTIONAL STATUS - GENERAL
TURNING FROM BACK TO SIDE WHILE IN FLAT BAD: A LITTLE
MOBILITY SCORE: 18
WALKING IN HOSPITAL ROOM: A LITTLE
MOVING FROM LYING ON BACK TO SITTING ON SIDE OF FLAT BED WITH BEDRAILS: A LITTLE
DAILY ACTIVITIY SCORE: 24
CLIMB 3 TO 5 STEPS WITH RAILING: A LITTLE
MOVING TO AND FROM BED TO CHAIR: A LITTLE
DRESSING REGULAR LOWER BODY CLOTHING: A LITTLE
DRESSING REGULAR UPPER BODY CLOTHING: A LITTLE
STANDING UP FROM CHAIR USING ARMS: A LITTLE
HELP NEEDED FOR BATHING: A LITTLE
DAILY ACTIVITIY SCORE: 21

## 2024-01-19 ASSESSMENT — PAIN SCALES - GENERAL
PAINLEVEL_OUTOF10: 10 - WORST POSSIBLE PAIN
PAINLEVEL_OUTOF10: 8
PAINLEVEL_OUTOF10: 3
PAINLEVEL_OUTOF10: 10 - WORST POSSIBLE PAIN
PAINLEVEL_OUTOF10: 10 - WORST POSSIBLE PAIN

## 2024-01-19 ASSESSMENT — ACTIVITIES OF DAILY LIVING (ADL): ADL_ASSISTANCE: INDEPENDENT

## 2024-01-19 NOTE — PROGRESS NOTES
Subjective   Hospital course:  Manolo Bashir is a 68 y.o. male with a PMHx of ESRD s/p 2x renal transplants (1992, 2013 in [facility], currently on prednisone, tacrolimus, azathioprine, last baseline Cr 2.5-2.8), IgG and IgA Lambda MGUS (s/p bone marrow biopsy 10/26/23), angina (last EF 60-65% 2/201), IDDM2 (last A1c 6.0), HTN, prostate cancer s/p radical prostatectomy, HCV (s/p Harvoni with undetectable viral load in 2019) presenting with generalized pain, oliguria, and ISIAH in the setting of hypertensive urgency.     In ED, initial workup significant for , glucose 202, creatinine 4.11, BUN 56, 2x neg trop, 3+ urine protein. ED gave patient given 1L LR and IV Dilaudid. Nephrology consulted and patient subsequently given 1x IV Lasix 60mg.     Interval History:  No acute events overnight. Patient reports feeling poorly this AM. He did not sleep well because he continues to suffer from back pain. He says it is the same pain that has been bothering him for months. Today he says it is 10/10 despite tylenol and Flexeril. Otherwise, patient is urinating regularly. He denies chest pain, dyspnea, or dizziness.     Objective     Vitals:    01/19/24 0826   BP: (!) 188/97   Pulse: 74   Resp: 18   Temp: 36 °C (96.8 °F)   SpO2: 93%       I/O last 3 completed shifts:  In: 270 (3.3 mL/kg) [P.O.:220; I.V.:50 (0.6 mL/kg)]  Out: 1925 (23.6 mL/kg) [Urine:1925 (0.7 mL/kg/hr)]  Weight: 81.6 kg   No intake/output data recorded.       Physical Exam  Const: Well-nourished, Well-developed  Eyes: PERRL, no conjunctival injection, and symmetrical lids  HENMT: Atraumatic external nose and ears, Moist MM  Neck: Symmetric, trachea midline, No thyromegaly  CVS: +S1/S2, No murmurs, rubs, or gallops, Peripheral pulses 2+ and equal in all extremities, 2+ pitting edema bilateral LE (improving)  RESP: Unlabored respiratory effort, Clear to auscultation bilaterally  GI: Distended, nontender, No hepatosplenomegaly  MSK: Extremities w/o deformity or ttp,  No cyanosis or clubbing  Skin: Warm, Dry, No rashes or lesions  Neuro: CNs II-XII grossly intact, Sensation grossly intact  Psych: Alert, & Oriented x3, Appropriate mood and affect        Scheduled medications  aspirin, 81 mg, oral, Daily  azaTHIOprine, 50 mg, oral, Daily  calcitriol, 0.25 mcg, oral, Daily  carvedilol, 37.5 mg, oral, BID  cholecalciferol, 1,000 Units, oral, Daily  cinacalcet, 30 mg, oral, Daily  cyclobenzaprine, 5 mg, oral, TID  ferrous sulfate (325 mg ferrous sulfate), 65 mg of iron, oral, BID  furosemide, 40 mg, intravenous, BID  hydrALAZINE, 100 mg, oral, TID  insulin glargine, 20 Units, subcutaneous, Nightly  insulin lispro, 0-5 Units, subcutaneous, TID with meals  isosorbide mononitrate ER, 60 mg, oral, Daily  lidocaine, 1 patch, transdermal, Daily  NIFEdipine ER, 60 mg, oral, Daily before breakfast  pantoprazole, 40 mg, oral, Daily before breakfast  polyethylene glycol, 17 g, oral, Daily  pravastatin, 10 mg, oral, Nightly  predniSONE, 10 mg, oral, q AM  tacrolimus, 2.5 mg, oral, q12h ZOË          Results for orders placed or performed during the hospital encounter of 01/16/24 (from the past 24 hour(s))   PTH, Intact   Result Value Ref Range    Parathyroid Hormone, Intact 159.7 (H) 18.5 - 88.0 pg/mL   POCT GLUCOSE   Result Value Ref Range    POCT Glucose 190 (H) 74 - 99 mg/dL   POCT GLUCOSE   Result Value Ref Range    POCT Glucose 341 (H) 74 - 99 mg/dL   Renal Function Panel   Result Value Ref Range    Glucose 222 (H) 74 - 99 mg/dL    Sodium 138 136 - 145 mmol/L    Potassium 4.6 3.5 - 5.3 mmol/L    Chloride 106 98 - 107 mmol/L    Bicarbonate 27 21 - 32 mmol/L    Anion Gap 10 10 - 20 mmol/L    Urea Nitrogen 58 (H) 6 - 23 mg/dL    Creatinine 4.17 (H) 0.50 - 1.30 mg/dL    eGFR 15 (L) >60 mL/min/1.73m*2    Calcium 8.5 (L) 8.6 - 10.6 mg/dL    Phosphorus 2.9 2.5 - 4.9 mg/dL    Albumin 2.5 (L) 3.4 - 5.0 g/dL   Magnesium   Result Value Ref Range    Magnesium 2.05 1.60 - 2.40 mg/dL   Tacrolimus level    Result Value Ref Range    Tacrolimus  5.8 <=15.0 ng/mL   POCT GLUCOSE   Result Value Ref Range    POCT Glucose 185 (H) 74 - 99 mg/dL         Assessment/Plan      Manolo Bashir is a 68 y.o. male with a PMHx of ESRD s/p 2x renal transplants (1992, 2013, currently on prednisone, tacrolimus, azathioprine, last baseline Cr 2.5-2.8), MGUS, pancytopenia, angina (last EF 60-65% 2/201), IDDM2 (last A1c 6.0), HTN, prostate cancer s/p radical prostatectomy, HCV (treated and RNA not detected last 10/18/23) presenting with oliguria and ISIAH in the setting of hypertensive urgency. Patient is clearly volume overloaded on exam and labs. Kidney function is below baseline. Etiology likely cardiorenal: renal graft failure vs heart failure (HFpEF) with secondary renal dysfunction. Patient will be admitted with IV diuresis to reduce volume overload with renal US to help assess for graft functioning. Will speak with nephrology regarding recommendations for diuresis and anti-hypertensive given transplant history. Given resolution of chest pain, 3x negative trops, and no consistent ischemic EKG changes, very low likelihood of ACS at this time. Will not continue Plavix s/p loading dose.     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 Transplant kidney resistive indices remain near the upper limits of normal, similar to prior. No evidence of new hydronephrosis.  2. Small amount of nonspecific fluid is present along the transplanted kidney in the left iliac  fossa, correlate with fluid volume status.  3. Nonobstructive renal calculi  - PTH elevated 225 6/2023 > 159.7 on 1/18  PLAN:  - Consulted nephrology, appreciate recs  - Stop IV Lasix 40mg BID  - Start torsemide 40mg PO BID  - Continue home Vit D, cinacalcet, iron  - qAM RFP, Mg, CBC  - Strict I/Os  - Daily weights  - CMV, BK, EBV PCRs     # DM2  - Glucose 200 on admission  - Last A1c 6.0 on 10/26/23  - Insulin dependent, on 24 units of lantus + SSI at home  PLAN:  - Continue 20 units lantus qHS  - SSI + Accucheks  - Hypoglycemia protocol  - Continue pravastatin 10mg qHS     # Chronic hypertension  # Hypertensive urgency  # Angina  - Presented with BP>180s, baseline is ~140s  - Home meds: ASA 81mg, coreg 37.5mg BID, amlodipine 10mg daily, hydralazine 100mg TID, imdur 60 daily  PLAN:  - Goal BP <140 systolic  - Resume home antihypertensive regimen  - Stopped home amlodipine 10mg daily  - Continue nifedipine 60mg daily     # MGUS  # Immunosuppression  # Pancytopenia  - Low cell counts on admission: WBC 3.1, Hb 9.7, Plt 111  - Home meds: tacrolimus 2.5mg BID, prednisone 10mg daily, and azathioprine 50mg daily  - Tacro on 1/17 was 13.5  PLAN:  - Resume immunosuppressive regimen  - Waiting on recheck AM Tacro level     # Back pain  - Unclear etiology, likely musculoskeletal given long-standing, relatively non-specific, and TTP  PLAN:  - Tylenol 650mg q6hr PRN  - Flexeril 5mg TID  - Lidocaine patches PRN  - Diclofenac gel PRN     Fluids: 2L fluid restriction  Nutrition: Potassium restricted diet  DVT ppx: subQ Heparin  Dispo: Likely home - pending improvement in symptoms  Surrogate Decision Maker if Needed: Amalia Enriquez [phone]   CODE STATUS: FULL CODE     Patient seen and discussed with Dr. Ojeda.     Patrice Anton MS4  1/19/24

## 2024-01-19 NOTE — PROGRESS NOTES
Physical Therapy    Physical Therapy Treatment    Patient Name: Manolo Bashir  MRN: [id number]  Today's Date: 1/19/2024  Time Calculation  Start Time: 1420  Stop Time: 1435  Time Calculation (min): 15 min       Assessment/Plan   PT Assessment  End of Session Communication: Bedside nurse  Assessment Comment: Continue to remain appropriate for Low intensity PT upon D/C from hospital.  End of Session Patient Position: Up in chair  PT Plan  Inpatient/Swing Bed or Outpatient: Inpatient  PT Plan  Treatment/Interventions: Bed mobility, Transfer training, Gait training, Stair training, Balance training, Strengthening, Endurance training, Therapeutic activity, Therapeutic exercise, Home exercise program  PT Plan: Skilled PT  PT Frequency: 3 times per week  PT Discharge Recommendations: Low intensity level of continued care  PT Recommended Transfer Status: Assist x1, Contact guard  PT - OK to Discharge: Yes (Pt evaled and D/C recc made)      General Visit Information:   PT  Visit  PT Received On: 01/19/24  General  Prior to Session Communication: Bedside nurse  Patient Position Received: Bed, 3 rail up, Alarm off, not on at start of session  General Comment: Pt supine in bed agreeable to work with PT.    Subjective   Precautions:  Precautions  Medical Precautions: Fall precautions  Vital Signs:       Objective   Pain:  Pain Assessment  Pain Assessment: 0-10  Pain Score: 10 - Worst possible pain  Pain Location: Back    Activity Tolerance:  Activity Tolerance  Endurance: Endurance does not limit participation in activity  Treatments:  Bed Mobility  Bed Mobility: Yes  Bed Mobility 1  Bed Mobility 1: Supine to sitting  Level of Assistance 1: Distant supervision  Bed Mobility Comments 1: Educated pt on positioning in bed laying supine and sidelying to help allievate back pain.    Ambulation/Gait Training 1  Surface 1: Level tile  Device 1: No device  Assistance 1: Distant supervision  Quality of Gait 1: Narrow base of  support  Comments/Distance (ft) 1: x400ft    Transfers  Transfer: Yes  Transfer 1  Transfer From 1: Bed to  Transfer to 1: Stand  Technique 1: Sit to stand  Transfer Level of Assistance 1: Distant supervision  Transfers 2  Transfer From 2: Stand to  Transfer to 2: Chair with arms  Technique 2: Stand to sit  Transfer Level of Assistance 2: Distant supervision    Stairs  Stairs: Yes  Stairs  Rails 1: Bilateral  Curb Step 1: No  Device 1: Railing  Assistance 1: Close supervision  Comment/Number of Steps 1: 2 x 4 steps, up/down, descending sideways d/t B ankles    Outcome Measures:  [facility] Basic Mobility  Turning from your back to your side while in a flat bed without using bedrails: A little  Moving from lying on your back to sitting on the side of a flat bed without using bedrails: A little  Moving to and from bed to chair (including a wheelchair): A little  Standing up from a chair using your arms (e.g. wheelchair or bedside chair): A little  To walk in hospital room: A little  Climbing 3-5 steps with railing: A little  Basic Mobility - Total Score: 18    Education Documentation  Body Mechanics, taught by Alexandra Rosario PTA at 1/19/2024  3:14 PM.  Learner: Patient  Readiness: Acceptance  Method: Explanation  Response: Verbalizes Understanding  Comment: Educated pt on positioning in bed to help with lower back pain.    Mobility Training, taught by Alexandra Rosario PTA at 1/19/2024  3:14 PM.  Learner: Patient  Readiness: Acceptance  Method: Explanation  Response: Verbalizes Understanding  Comment: Educated pt on positioning in bed to help with lower back pain.    Education Comments  No comments found.        OP EDUCATION:       Encounter Problems       Encounter Problems (Active)       Balance       Patient will maintain standing and sitting balance to allow for completion of daily activities (Progressing)       Start:  01/18/24    Expected End:  02/01/24               Mobility       Pt will ambulate 400ft indep  to improve community distance ambulation safety.  (Progressing)       Start:  01/18/24    Expected End:  02/01/24            Pt will ascend/descend 12 stairs Mod Indep with rails to improve functional safety to go home  (Progressing)       Start:  01/18/24    Expected End:  02/01/24               Transfers       Pt will perform 5 STS indep to improve functional mobility (Progressing)       Start:  01/18/24    Expected End:  02/01/24                 Alexandra Rosario [facility]  Rehab Office [phone]

## 2024-01-19 NOTE — PROGRESS NOTES
Occupational Therapy    Evaluation    Patient Name: Manolo Bashir  MRN: [id number]  Today's Date: 1/19/2024  Time Calculation  Start Time: 1037  Stop Time: 1046  Time Calculation (min): 9 min        Assessment:  OT Assessment: Patient is a 69yo male presenting with independence in bed mobility and transfers. Does not present with deficits in functional activity tolerance. DOes not present with needs for skilled OT intervention at this time  Prognosis: Good  Barriers to Discharge: None  Evaluation/Treatment Tolerance: Patient tolerated treatment well  Medical Staff Made Aware: Yes  End of Session Communication: Bedside nurse  End of Session Patient Position: Up in chair  Prognosis: Good  Barriers to Discharge: None  Evaluation/Treatment Tolerance: Patient tolerated treatment well  Medical Staff Made Aware: Yes  Strengths: Attitude of self, Capable of completing ADLs semi/independent, Premorbid level of function, Leisure activity  Plan:  No Skilled OT: At baseline function  OT Discharge Recommendations: No further acute OT  OT Recommended Transfer Status: Stand by assist  OT - OK to Discharge: Yes       Subjective   Current Problem:  1. Renal function impairment        2. Other chest pain          General:  General  Reason for Referral: Renal function impairment  Past Medical History Relevant to Rehab: ESRD s/p renal transplant (1992, 2013 (left kidney)), HTN, T2DM, CKD 4,  Prior to Session Communication: Bedside nurse  Patient Position Received: Bed, 2 rail up, Alarm off, not on at start of session  Preferred Learning Style: verbal, auditory  General Comment: Pt supine in bed this date, reports has been up to bathroom and already cleaned up this AM. Willing to work with therapy  Precautions:     Vital Signs:     Pain:  Pain Assessment  Pain Assessment: 0-10  Pain Score: 10 - Worst possible pain  Pain Location: Abdomen    Objective   Cognition:  Overall Cognitive Status: Within Functional Limits  Orientation Level:  Oriented X4           Home Living:  Type of Home: House  Lives With: Adult children (daughter)  Home Adaptive Equipment: None  Home Layout: Multi-level, Bed/bath upstairs  Home Access: Stairs to enter with rails  Entrance Stairs-Number of Steps: 4  Bathroom Shower/Tub: Tub/shower unit  Bathroom Toilet: Standard  Bathroom Equipment: None  Prior Function:  Level of [place]: Independent with ADLs and functional transfers, Independent with homemaking with ambulation  Receives Help From: Family (as needed)  ADL Assistance: Independent  Homemaking Assistance: Independent  Ambulatory Assistance: Independent  Leisure: reports enjoys playing basketball  Prior Function Comments: Independent without difficulty. Pt notes still exercising as well as working on cars. Drives, denies falls  IADL History:     ADL:  ADL Comments: no formal ADLs on evaluation, reports completed all earlier this AM on own  Activity Tolerance:  Endurance: Endurance does not limit participation in activity  Bed Mobility/Transfers: Bed Mobility  Bed Mobility: Yes  Bed Mobility 1  Bed Mobility 1: Supine to sitting  Level of Assistance 1: Distant supervision    Transfers  Transfer: Yes  Transfer 1  Transfer From 1: Sit to, Stand to  Transfer to 1: Stand, Sit  Technique 1: Sit to stand, Stand to sit  Transfer Device 1:  (none)  Transfer Level of Assistance 1: Distant supervision  Trials/Comments 1: x1      Ambulation/Gait Training:  Ambulation/Gait Training  Ambulation/Gait Training Performed: Yes  Ambulation/Gait Training 1  Comments/Distance (ft) 1: functional mobility x~150 feet with no AD, no SOB or LOB   Modalities:     Vision:Vision - Basic Assessment  Current Vision: No visual deficits  Sensation:  Light Touch: No apparent deficits  Strength:     Perception:     Coordination:  Movements are Fluid and Coordinated: Yes   Hand Function:     Extremities: RUE   RUE : Within Functional Limits and LUE   LUE: Within Functional Limits        Outcome  Measures:[facility] Daily Activity  Putting on and taking off regular lower body clothing: None  Bathing (including washing, rinsing, drying): None  Putting on and taking off regular upper body clothing: None  Toileting, which includes using toilet, bedpan or urinal: None  Taking care of personal grooming such as brushing teeth: None  Eating Meals: None  Daily Activity - Total Score: 24         and OT Adult Other Outcome Measures  4AT: 0, negative    Education Documentation  Body Mechanics, taught by Kathrine Loaiza OT at 1/19/2024 12:09 PM.  Learner: Patient  Readiness: Acceptance  Method: Explanation  Response: Verbalizes Understanding    ADL Training, taught by Kathrine Loaiza OT at 1/19/2024 12:09 PM.  Learner: Patient  Readiness: Acceptance  Method: Explanation  Response: Verbalizes Understanding    Education Comments  No comments found.      01/19/24 at 12:10 PM - Kathrine Loaiza OT

## 2024-01-19 NOTE — CARE PLAN
The patient's goals for the shift include  pain free     The clinical goals for the shift include free from pain    Over the shift, the patient did not make progress toward the following goals. Barriers to progression include lower back pain. Recommendations to address these barriers include pain medications .

## 2024-01-19 NOTE — PROGRESS NOTES
"Manolo Bashir is a 68 y.o. male on day 3 of admission presenting with Renal function impairment.    Subjective   Seen at bedside this am. No acute events overnight,   Leg swelling much improved.   Continues to have low back pain.     Objective     Last Recorded Vitals  Blood pressure (!) 188/97, pulse 74, temperature 36 °C (96.8 °F), temperature source Tympanic, resp. rate 18, height 1.727 m (5' 8\"), weight 81.6 kg (179 lb 14.4 oz), SpO2 93 %.  Intake/Output last 3 Shifts:  I/O last 3 completed shifts:  In: 270 (3.3 mL/kg) [P.O.:220; I.V.:50 (0.6 mL/kg)]  Out: 1925 (23.6 mL/kg) [Urine:1925 (0.7 mL/kg/hr)]  Weight: 81.6 kg     A&ox3, mild distress sec to back pain  Lungs with equal air entry, improved aeration  Rrr, no r/g  Abd soft, nt, nd  Mild LLQ tenderness  Trace LE edema     Results for orders placed or performed during the hospital encounter of 01/16/24 (from the past 24 hour(s))   POCT GLUCOSE   Result Value Ref Range    POCT Glucose 190 (H) 74 - 99 mg/dL   POCT GLUCOSE   Result Value Ref Range    POCT Glucose 341 (H) 74 - 99 mg/dL   Renal Function Panel   Result Value Ref Range    Glucose 222 (H) 74 - 99 mg/dL    Sodium 138 136 - 145 mmol/L    Potassium 4.6 3.5 - 5.3 mmol/L    Chloride 106 98 - 107 mmol/L    Bicarbonate 27 21 - 32 mmol/L    Anion Gap 10 10 - 20 mmol/L    Urea Nitrogen 58 (H) 6 - 23 mg/dL    Creatinine 4.17 (H) 0.50 - 1.30 mg/dL    eGFR 15 (L) >60 mL/min/1.73m*2    Calcium 8.5 (L) 8.6 - 10.6 mg/dL    Phosphorus 2.9 2.5 - 4.9 mg/dL    Albumin 2.5 (L) 3.4 - 5.0 g/dL   Magnesium   Result Value Ref Range    Magnesium 2.05 1.60 - 2.40 mg/dL   Tacrolimus level   Result Value Ref Range    Tacrolimus  5.8 <=15.0 ng/mL   POCT GLUCOSE   Result Value Ref Range    POCT Glucose 185 (H) 74 - 99 mg/dL   POCT GLUCOSE   Result Value Ref Range    POCT Glucose 147 (H) 74 - 99 mg/dL       Current Facility-Administered Medications:     acetaminophen (Tylenol) tablet 650 mg, 650 mg, oral, q6h PRN, Tammi Oden, " MD, 650 mg at 01/19/24 0322    aspirin chewable tablet 81 mg, 81 mg, oral, Daily, Rosita Loera MD, 81 mg at 01/19/24 0849    azaTHIOprine (Imuran) tablet 50 mg, 50 mg, oral, Daily, Tammi Oden MD, 50 mg at 01/19/24 0851    calcitriol (Rocaltrol) capsule 0.25 mcg, 0.25 mcg, oral, Daily, Tammi Oden MD, 0.25 mcg at 01/19/24 0847    carvedilol (Coreg) tablet 37.5 mg, 37.5 mg, oral, BID, Tammi Oden MD, 37.5 mg at 01/19/24 0849    cholecalciferol (Vitamin D-3) tablet 1,000 Units, 1,000 Units, oral, Daily, Tammi Oden MD, 1,000 Units at 01/19/24 0850    cinacalcet (Sensipar) tablet 30 mg, 30 mg, oral, Daily, Tammi Oden MD, 30 mg at 01/19/24 0851    cyclobenzaprine (Flexeril) tablet 5 mg, 5 mg, oral, TID, Keaton House MD, 5 mg at 01/19/24 0847    dextrose 10 % in water (D10W) infusion, 0.3 g/kg/hr, intravenous, Once PRN, Tammi Oden MD    dextrose 50 % injection 25 g, 25 g, intravenous, q15 min PRN, Tammi Oden MD    diclofenac sodium (Voltaren) 1 % gel 1 Application, 4 g, Topical, 4x daily PRN, Tammi Oden MD    ferrous sulfate (325 mg ferrous sulfate) tablet 1 tablet, 65 mg of iron, oral, BID, Tammi Oden MD, 1 tablet at 01/19/24 0850    glucagon (Glucagen) injection 1 mg, 1 mg, intramuscular, q15 min PRN, Tammi Oden MD    hydrALAZINE (Apresoline) tablet 100 mg, 100 mg, oral, TID, Tammi Oden MD, 100 mg at 01/19/24 0849    insulin glargine (Lantus) injection 20 Units, 20 Units, subcutaneous, Nightly, Tammi Oden MD, 20 Units at 01/18/24 2048    insulin lispro (HumaLOG) injection 0-5 Units, 0-5 Units, subcutaneous, TID with meals, Tammi Oden MD, 1 Units at 01/19/24 0824    isosorbide mononitrate ER (Imdur) 24 hr tablet 60 mg, 60 mg, oral, Daily, Rosita Loera MD, 60 mg at 01/19/24 0849    lidocaine 4 % patch 1 patch, 1 patch, transdermal, Daily, Tammi Oden MD, 1 patch at 01/19/24 1003    NIFEdipine ER (Adalat CC) 24 hr tablet 60 mg, 60 mg, oral,  Daily before breakfast, Sunitha Collado MD, 60 mg at 01/19/24 0617    pantoprazole (ProtoNix) EC tablet 40 mg, 40 mg, oral, Daily before breakfast, Sunitha Collado MD, 40 mg at 01/19/24 0617    polyethylene glycol (Glycolax, Miralax) packet 17 g, 17 g, oral, Daily, Tammi Oden MD, 17 g at 01/19/24 0847    pravastatin (Pravachol) tablet 10 mg, 10 mg, oral, Nightly, Tammi Oden MD, 10 mg at 01/18/24 2047    predniSONE (Deltasone) tablet 10 mg, 10 mg, oral, q AM, Tammi Oden MD, 10 mg at 01/19/24 0850    tacrolimus (Prograf) capsule 2.5 mg, 2.5 mg, oral, q12h ZOË, Tammi Oedn MD, 2.5 mg at 01/19/24 0618    torsemide (Demadex) tablet 40 mg, 40 mg, oral, BID with meals, Tammi Oden MD    Allograft biopsy: 11/20/2023  FINAL DIAGNOSIS   KIDNEY ALLOGRAFT, LEFT ILIAC FOSSA, PERCUTANEOUS CORE BIOPSY:  -- LIMITED SAMPLE, PREDOMINANTLY MEDULLA  -- FOCAL CRESCENTIC GLOMERULONEPHRITIS  -- CHANGES SUGGESTIVE OF IMMUNE COMPLEX GLOMERULONEPHRITIS  -- SEVERE ARTERIOLAR HYALINOSIS AND ARTERIOSCLEROSIS   -- THICKENED GLOMERULAR BASEMENT MEMBRANES BY ELECTRON MICROSCOPY (SEE COMMENT)         Assessment/Plan      Mr. Bashir is a 68 y.o. male  who is here for follow up s/p kidney transplant.     TRANSPLANT DATE: 7/13/2013 (Kidney), 3/26/1994 (Kidney)        1. ESRD S/P kidney transplant   - impaired allograft function. Recent serum Cr ~4. Likely progressing to ESRD.  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   -Last urine protein/creatinine ratio is 5.22, recently taken off losartan 25 mg/d sec to ISIAH and hyperK. Cont to hold for now.   - improved hypervolemia on exam. Consider switch to torsemide 40mg bid.   - Avoid nephrotoxic medications, NSAIDs, and IV contrast.     2. Immunosuppression  -Tacrolimus level last check was 5.8, acceptable. Goal Fk 5-8.   -Continue current immunosuppression regimen: tac, 2.5 mg bid pred 10mg/d, Imuran 50mg/d.      3. HTN urgency:   - BPs remain elevated.  Will monitor response to diuresis. Pls change nifedipine to 60 mg bid. Cont other meds at current doses.     4.. Electrolytes:  - hyperkalemia: ARB on hold. was on Lokelma 5 g/d as outpt. Currently on hold. Serum K acceptable.   - other metabolic parameters acceptable.      5. Bone Mineral Disease/Osteoporosis  - Ca, phos acceptbale.  1/2023. Pls change Sensipar to 30mg every other day,     6.Anemia/Leukopenia:  - Hb 9.2, slightly down. On PO iron. Will start DARIANA if Hb <9.   - borderline low WBC, 3k. CMV, BK, EBV PCR neg 1/17/24.     Rest of the management per primary team.  Potential discharge home tomorrow. Needs follow up in renal clinic in 2 weeks.    Raad Rolle MD

## 2024-01-19 NOTE — PROGRESS NOTES
Transitional Care Coordination Progress Note:  Plan per medical team: following up with nephrology for their recommendations  Payer:[facility] Plan  Status: Inpatient  Discharge disposition: Home  Potential barriers: none  ADOD: Monday  Ramonita Munoz RN

## 2024-01-20 LAB
ALBUMIN SERPL BCP-MCNC: 2.5 G/DL (ref 3.4–5)
ANION GAP SERPL CALC-SCNC: 11 MMOL/L (ref 10–20)
BASOPHILS # BLD AUTO: 0.01 X10*3/UL (ref 0–0.1)
BASOPHILS NFR BLD AUTO: 0.4 %
BUN SERPL-MCNC: 59 MG/DL (ref 6–23)
CALCIUM SERPL-MCNC: 8.6 MG/DL (ref 8.6–10.6)
CARDIAC TROPONIN I PNL SERPL HS: 34 NG/L (ref 0–53)
CHLORIDE SERPL-SCNC: 106 MMOL/L (ref 98–107)
CO2 SERPL-SCNC: 26 MMOL/L (ref 21–32)
CREAT SERPL-MCNC: 4.08 MG/DL (ref 0.5–1.3)
EGFRCR SERPLBLD CKD-EPI 2021: 15 ML/MIN/1.73M*2
EOSINOPHIL # BLD AUTO: 0.04 X10*3/UL (ref 0–0.7)
EOSINOPHIL NFR BLD AUTO: 1.4 %
ERYTHROCYTE [DISTWIDTH] IN BLOOD BY AUTOMATED COUNT: 13.3 % (ref 11.5–14.5)
GLUCOSE BLD MANUAL STRIP-MCNC: 188 MG/DL (ref 74–99)
GLUCOSE BLD MANUAL STRIP-MCNC: 207 MG/DL (ref 74–99)
GLUCOSE BLD MANUAL STRIP-MCNC: 251 MG/DL (ref 74–99)
GLUCOSE BLD MANUAL STRIP-MCNC: 332 MG/DL (ref 74–99)
GLUCOSE BLD MANUAL STRIP-MCNC: 335 MG/DL (ref 74–99)
GLUCOSE SERPL-MCNC: 306 MG/DL (ref 74–99)
HCT VFR BLD AUTO: 28.3 % (ref 41–52)
HGB BLD-MCNC: 9.1 G/DL (ref 13.5–17.5)
IMM GRANULOCYTES # BLD AUTO: 0.02 X10*3/UL (ref 0–0.7)
IMM GRANULOCYTES NFR BLD AUTO: 0.7 % (ref 0–0.9)
LYMPHOCYTES # BLD AUTO: 0.67 X10*3/UL (ref 1.2–4.8)
LYMPHOCYTES NFR BLD AUTO: 23.9 %
MAGNESIUM SERPL-MCNC: 1.96 MG/DL (ref 1.6–2.4)
MCH RBC QN AUTO: 28.1 PG (ref 26–34)
MCHC RBC AUTO-ENTMCNC: 32.2 G/DL (ref 32–36)
MCV RBC AUTO: 87 FL (ref 80–100)
MONOCYTES # BLD AUTO: 0.23 X10*3/UL (ref 0.1–1)
MONOCYTES NFR BLD AUTO: 8.2 %
NEUTROPHILS # BLD AUTO: 1.83 X10*3/UL (ref 1.2–7.7)
NEUTROPHILS NFR BLD AUTO: 65.4 %
NRBC BLD-RTO: 0 /100 WBCS (ref 0–0)
PHOSPHATE SERPL-MCNC: 3.2 MG/DL (ref 2.5–4.9)
PLATELET # BLD AUTO: 108 X10*3/UL (ref 150–450)
POTASSIUM SERPL-SCNC: 4.7 MMOL/L (ref 3.5–5.3)
RBC # BLD AUTO: 3.24 X10*6/UL (ref 4.5–5.9)
SODIUM SERPL-SCNC: 138 MMOL/L (ref 136–145)
WBC # BLD AUTO: 2.8 X10*3/UL (ref 4.4–11.3)

## 2024-01-20 PROCEDURE — 80069 RENAL FUNCTION PANEL: CPT

## 2024-01-20 PROCEDURE — 2500000001 HC RX 250 WO HCPCS SELF ADMINISTERED DRUGS (ALT 637 FOR MEDICARE OP): Performed by: STUDENT IN AN ORGANIZED HEALTH CARE EDUCATION/TRAINING PROGRAM

## 2024-01-20 PROCEDURE — 2500000001 HC RX 250 WO HCPCS SELF ADMINISTERED DRUGS (ALT 637 FOR MEDICARE OP)

## 2024-01-20 PROCEDURE — 93010 ELECTROCARDIOGRAM REPORT: CPT | Performed by: INTERNAL MEDICINE

## 2024-01-20 PROCEDURE — 1100000001 HC PRIVATE ROOM DAILY

## 2024-01-20 PROCEDURE — 83036 HEMOGLOBIN GLYCOSYLATED A1C: CPT | Performed by: STUDENT IN AN ORGANIZED HEALTH CARE EDUCATION/TRAINING PROGRAM

## 2024-01-20 PROCEDURE — 2500000004 HC RX 250 GENERAL PHARMACY W/ HCPCS (ALT 636 FOR OP/ED): Performed by: STUDENT IN AN ORGANIZED HEALTH CARE EDUCATION/TRAINING PROGRAM

## 2024-01-20 PROCEDURE — 99231 SBSQ HOSP IP/OBS SF/LOW 25: CPT | Performed by: INTERNAL MEDICINE

## 2024-01-20 PROCEDURE — 85025 COMPLETE CBC W/AUTO DIFF WBC: CPT

## 2024-01-20 PROCEDURE — 82947 ASSAY GLUCOSE BLOOD QUANT: CPT

## 2024-01-20 PROCEDURE — 99232 SBSQ HOSP IP/OBS MODERATE 35: CPT | Performed by: STUDENT IN AN ORGANIZED HEALTH CARE EDUCATION/TRAINING PROGRAM

## 2024-01-20 PROCEDURE — 2500000005 HC RX 250 GENERAL PHARMACY W/O HCPCS: Performed by: STUDENT IN AN ORGANIZED HEALTH CARE EDUCATION/TRAINING PROGRAM

## 2024-01-20 PROCEDURE — 84484 ASSAY OF TROPONIN QUANT: CPT

## 2024-01-20 PROCEDURE — 36415 COLL VENOUS BLD VENIPUNCTURE: CPT

## 2024-01-20 PROCEDURE — 2500000004 HC RX 250 GENERAL PHARMACY W/ HCPCS (ALT 636 FOR OP/ED)

## 2024-01-20 PROCEDURE — 83735 ASSAY OF MAGNESIUM: CPT

## 2024-01-20 RX ORDER — SIMETHICONE 80 MG
40 TABLET,CHEWABLE ORAL ONCE
Status: COMPLETED | OUTPATIENT
Start: 2024-01-20 | End: 2024-01-20

## 2024-01-20 RX ORDER — CINACALCET 30 MG/1
30 TABLET, FILM COATED ORAL EVERY OTHER DAY
Status: DISCONTINUED | OUTPATIENT
Start: 2024-01-21 | End: 2024-01-21 | Stop reason: HOSPADM

## 2024-01-20 RX ORDER — GABAPENTIN 300 MG/1
300 CAPSULE ORAL NIGHTLY
Status: DISCONTINUED | OUTPATIENT
Start: 2024-01-20 | End: 2024-01-21 | Stop reason: HOSPADM

## 2024-01-20 RX ORDER — CINACALCET 30 MG/1
30 TABLET, FILM COATED ORAL EVERY OTHER DAY
Status: DISCONTINUED | OUTPATIENT
Start: 2024-01-20 | End: 2024-01-20

## 2024-01-20 RX ORDER — PREDNISONE 5 MG/1
5 TABLET ORAL EVERY MORNING
Status: DISCONTINUED | OUTPATIENT
Start: 2024-01-21 | End: 2024-01-21 | Stop reason: HOSPADM

## 2024-01-20 RX ORDER — NIFEDIPINE 60 MG/1
60 TABLET, FILM COATED, EXTENDED RELEASE ORAL 2 TIMES DAILY
Status: DISCONTINUED | OUTPATIENT
Start: 2024-01-20 | End: 2024-01-21 | Stop reason: HOSPADM

## 2024-01-20 RX ORDER — INSULIN GLARGINE 100 [IU]/ML
24 INJECTION, SOLUTION SUBCUTANEOUS NIGHTLY
Status: DISCONTINUED | OUTPATIENT
Start: 2024-01-20 | End: 2024-01-21 | Stop reason: HOSPADM

## 2024-01-20 RX ORDER — FAMOTIDINE 20 MG/1
20 TABLET, FILM COATED ORAL ONCE
Status: COMPLETED | OUTPATIENT
Start: 2024-01-20 | End: 2024-01-20

## 2024-01-20 RX ADMIN — PANTOPRAZOLE SODIUM 40 MG: 40 TABLET, DELAYED RELEASE ORAL at 06:41

## 2024-01-20 RX ADMIN — CYCLOBENZAPRINE 5 MG: 10 TABLET, FILM COATED ORAL at 21:03

## 2024-01-20 RX ADMIN — CYCLOBENZAPRINE 5 MG: 10 TABLET, FILM COATED ORAL at 09:03

## 2024-01-20 RX ADMIN — INSULIN LISPRO 3 UNITS: 100 INJECTION, SOLUTION INTRAVENOUS; SUBCUTANEOUS at 12:42

## 2024-01-20 RX ADMIN — AZATHIOPRINE 50 MG: 50 TABLET ORAL at 09:04

## 2024-01-20 RX ADMIN — FAMOTIDINE 20 MG: 20 TABLET, FILM COATED ORAL at 09:04

## 2024-01-20 RX ADMIN — CALCITRIOL CAPSULES 0.25 MCG 0.25 MCG: 0.25 CAPSULE ORAL at 09:04

## 2024-01-20 RX ADMIN — GABAPENTIN 300 MG: 300 CAPSULE ORAL at 21:03

## 2024-01-20 RX ADMIN — CARVEDILOL 37.5 MG: 12.5 TABLET, FILM COATED ORAL at 09:03

## 2024-01-20 RX ADMIN — PREDNISONE 10 MG: 20 TABLET ORAL at 09:03

## 2024-01-20 RX ADMIN — SIMETHICONE 40 MG: 80 TABLET, CHEWABLE ORAL at 04:52

## 2024-01-20 RX ADMIN — TORSEMIDE 40 MG: 20 TABLET ORAL at 16:35

## 2024-01-20 RX ADMIN — ISOSORBIDE MONONITRATE 60 MG: 60 TABLET, EXTENDED RELEASE ORAL at 09:03

## 2024-01-20 RX ADMIN — Medication 1000 UNITS: at 09:03

## 2024-01-20 RX ADMIN — HYDRALAZINE HYDROCHLORIDE 100 MG: 50 TABLET ORAL at 16:34

## 2024-01-20 RX ADMIN — FERROUS SULFATE TAB 325 MG (65 MG ELEMENTAL FE) 1 TABLET: 325 (65 FE) TAB at 21:03

## 2024-01-20 RX ADMIN — HYDRALAZINE HYDROCHLORIDE 100 MG: 50 TABLET ORAL at 21:03

## 2024-01-20 RX ADMIN — FERROUS SULFATE TAB 325 MG (65 MG ELEMENTAL FE) 1 TABLET: 325 (65 FE) TAB at 09:00

## 2024-01-20 RX ADMIN — INSULIN LISPRO 1 UNITS: 100 INJECTION, SOLUTION INTRAVENOUS; SUBCUTANEOUS at 08:59

## 2024-01-20 RX ADMIN — TACROLIMUS 2.5 MG: 0.5 CAPSULE ORAL at 17:41

## 2024-01-20 RX ADMIN — INSULIN LISPRO 4 UNITS: 100 INJECTION, SOLUTION INTRAVENOUS; SUBCUTANEOUS at 17:38

## 2024-01-20 RX ADMIN — TACROLIMUS 2.5 MG: 0.5 CAPSULE ORAL at 06:41

## 2024-01-20 RX ADMIN — TORSEMIDE 40 MG: 20 TABLET ORAL at 09:03

## 2024-01-20 RX ADMIN — LIDOCAINE 1 PATCH: 4 PATCH TOPICAL at 09:05

## 2024-01-20 RX ADMIN — NIFEDIPINE 60 MG: 60 TABLET, FILM COATED, EXTENDED RELEASE ORAL at 06:41

## 2024-01-20 RX ADMIN — INSULIN GLARGINE 24 UNITS: 100 INJECTION, SOLUTION SUBCUTANEOUS at 21:03

## 2024-01-20 RX ADMIN — DICLOFENAC SODIUM TOPICAL GEL, 1% 1 APPLICATION: 10 GEL TOPICAL at 23:38

## 2024-01-20 RX ADMIN — CARVEDILOL 37.5 MG: 12.5 TABLET, FILM COATED ORAL at 21:03

## 2024-01-20 RX ADMIN — CYCLOBENZAPRINE 5 MG: 10 TABLET, FILM COATED ORAL at 16:35

## 2024-01-20 RX ADMIN — PRAVASTATIN SODIUM 10 MG: 20 TABLET ORAL at 21:03

## 2024-01-20 RX ADMIN — NIFEDIPINE 60 MG: 60 TABLET, FILM COATED, EXTENDED RELEASE ORAL at 21:04

## 2024-01-20 RX ADMIN — HYDRALAZINE HYDROCHLORIDE 100 MG: 50 TABLET ORAL at 09:03

## 2024-01-20 RX ADMIN — ASPIRIN 81 MG CHEWABLE TABLET 81 MG: 81 TABLET CHEWABLE at 09:04

## 2024-01-20 ASSESSMENT — PAIN SCALES - GENERAL
PAINLEVEL_OUTOF10: 5 - MODERATE PAIN
PAINLEVEL_OUTOF10: 3

## 2024-01-20 NOTE — PROGRESS NOTES
"Manolo Bashir is a 68 y.o. male on day 4 of admission presenting with Renal function impairment.    Subjective   Seen at bedside this am. No acute events overnight.   Leg swelling down. Pt more concerned about ongoing back pain for the past 6 months.     Objective     Last Recorded Vitals  Blood pressure 167/84, pulse 81, temperature 36.5 °C (97.7 °F), resp. rate 16, height 1.727 m (5' 8\"), weight 80.9 kg (178 lb 6.4 oz), SpO2 94 %.  Intake/Output last 3 Shifts:  I/O last 3 completed shifts:  In: - (0 mL/kg)   Out: 650 (8 mL/kg) [Urine:650 (0.2 mL/kg/hr)]  Weight: 80.9 kg     A&ox3, mild distress sec to back pain  Lungs with equal air entry, no added sounds  Rrr, no r/g  Abd soft, nt, nd  No allograft tenderness  Trace LE edema     Results for orders placed or performed during the hospital encounter of 01/16/24 (from the past 24 hour(s))   POCT GLUCOSE   Result Value Ref Range    POCT Glucose 147 (H) 74 - 99 mg/dL   POCT GLUCOSE   Result Value Ref Range    POCT Glucose 328 (H) 74 - 99 mg/dL   POCT GLUCOSE   Result Value Ref Range    POCT Glucose 329 (H) 74 - 99 mg/dL   Magnesium   Result Value Ref Range    Magnesium 1.96 1.60 - 2.40 mg/dL   Troponin I, High Sensitivity   Result Value Ref Range    Troponin I, High Sensitivity 34 0 - 53 ng/L   CBC and Auto Differential   Result Value Ref Range    WBC 2.8 (L) 4.4 - 11.3 x10*3/uL    nRBC 0.0 0.0 - 0.0 /100 WBCs    RBC 3.24 (L) 4.50 - 5.90 x10*6/uL    Hemoglobin 9.1 (L) 13.5 - 17.5 g/dL    Hematocrit 28.3 (L) 41.0 - 52.0 %    MCV 87 80 - 100 fL    MCH 28.1 26.0 - 34.0 pg    MCHC 32.2 32.0 - 36.0 g/dL    RDW 13.3 11.5 - 14.5 %    Platelets 108 (L) 150 - 450 x10*3/uL    Neutrophils % 65.4 40.0 - 80.0 %    Immature Granulocytes %, Automated 0.7 0.0 - 0.9 %    Lymphocytes % 23.9 13.0 - 44.0 %    Monocytes % 8.2 2.0 - 10.0 %    Eosinophils % 1.4 0.0 - 6.0 %    Basophils % 0.4 0.0 - 2.0 %    Neutrophils Absolute 1.83 1.20 - 7.70 x10*3/uL    Immature Granulocytes Absolute, " Automated 0.02 0.00 - 0.70 x10*3/uL    Lymphocytes Absolute 0.67 (L) 1.20 - 4.80 x10*3/uL    Monocytes Absolute 0.23 0.10 - 1.00 x10*3/uL    Eosinophils Absolute 0.04 0.00 - 0.70 x10*3/uL    Basophils Absolute 0.01 0.00 - 0.10 x10*3/uL   Renal Function Panel   Result Value Ref Range    Glucose 306 (H) 74 - 99 mg/dL    Sodium 138 136 - 145 mmol/L    Potassium 4.7 3.5 - 5.3 mmol/L    Chloride 106 98 - 107 mmol/L    Bicarbonate 26 21 - 32 mmol/L    Anion Gap 11 10 - 20 mmol/L    Urea Nitrogen 59 (H) 6 - 23 mg/dL    Creatinine 4.08 (H) 0.50 - 1.30 mg/dL    eGFR 15 (L) >60 mL/min/1.73m*2    Calcium 8.6 8.6 - 10.6 mg/dL    Phosphorus 3.2 2.5 - 4.9 mg/dL    Albumin 2.5 (L) 3.4 - 5.0 g/dL   POCT GLUCOSE   Result Value Ref Range    POCT Glucose 188 (H) 74 - 99 mg/dL       Current Facility-Administered Medications:     acetaminophen (Tylenol) tablet 650 mg, 650 mg, oral, q6h PRN, Tammi Oden MD, 650 mg at 01/19/24 0322    aspirin chewable tablet 81 mg, 81 mg, oral, Daily, Rosita Loera MD, 81 mg at 01/20/24 0904    azaTHIOprine (Imuran) tablet 50 mg, 50 mg, oral, Daily, Tammi Oden MD, 50 mg at 01/20/24 0904    calcitriol (Rocaltrol) capsule 0.25 mcg, 0.25 mcg, oral, Daily, Tammi Oden MD, 0.25 mcg at 01/20/24 0904    carvedilol (Coreg) tablet 37.5 mg, 37.5 mg, oral, BID, Tammi Oden MD, 37.5 mg at 01/20/24 0903    cholecalciferol (Vitamin D-3) tablet 1,000 Units, 1,000 Units, oral, Daily, Tammi Oden MD, 1,000 Units at 01/20/24 0903    [START ON 1/21/2024] cinacalcet (Sensipar) tablet 30 mg, 30 mg, oral, Every other day, Tammi Oden MD    cyclobenzaprine (Flexeril) tablet 5 mg, 5 mg, oral, TID, Keaton House MD, 5 mg at 01/20/24 0903    dextrose 10 % in water (D10W) infusion, 0.3 g/kg/hr, intravenous, Once PRN, Tammi Oden MD    dextrose 50 % injection 25 g, 25 g, intravenous, q15 min PRN, Tammi Oden MD    diclofenac sodium (Voltaren) 1 % gel 1 Application, 4 g, Topical, 4x daily PRN,  Tammi Oden MD    ferrous sulfate (325 mg ferrous sulfate) tablet 1 tablet, 65 mg of iron, oral, BID, Tammi Oden MD, 1 tablet at 01/20/24 0900    glucagon (Glucagen) injection 1 mg, 1 mg, intramuscular, q15 min PRN, Tammi Oden MD    hydrALAZINE (Apresoline) tablet 100 mg, 100 mg, oral, TID, Tammi Oden MD, 100 mg at 01/20/24 0903    insulin glargine (Lantus) injection 24 Units, 24 Units, subcutaneous, Nightly, Tammi Oden MD    insulin lispro (HumaLOG) injection 0-5 Units, 0-5 Units, subcutaneous, TID with meals, Tammi Oden MD, 1 Units at 01/20/24 0859    isosorbide mononitrate ER (Imdur) 24 hr tablet 60 mg, 60 mg, oral, Daily, Rosita Loera MD, 60 mg at 01/20/24 0903    lidocaine 4 % patch 1 patch, 1 patch, transdermal, Daily, Tammi Oden MD, 1 patch at 01/20/24 0905    NIFEdipine ER (Adalat CC) 24 hr tablet 60 mg, 60 mg, oral, BID, Tammi Oden MD    pantoprazole (ProtoNix) EC tablet 40 mg, 40 mg, oral, Daily before breakfast, Sunitha Collado MD, 40 mg at 01/20/24 0641    polyethylene glycol (Glycolax, Miralax) packet 17 g, 17 g, oral, Daily, Tammi Oden MD, 17 g at 01/19/24 0847    pravastatin (Pravachol) tablet 10 mg, 10 mg, oral, Nightly, Tammi Oden MD, 10 mg at 01/19/24 2128    predniSONE (Deltasone) tablet 10 mg, 10 mg, oral, q AM, Tammi Oden MD, 10 mg at 01/20/24 0903    tacrolimus (Prograf) capsule 2.5 mg, 2.5 mg, oral, q12h ZOË, Tammi Oden MD, 2.5 mg at 01/20/24 0641    torsemide (Demadex) tablet 40 mg, 40 mg, oral, BID with meals, Tammi Oden MD, 40 mg at 01/20/24 0903    Allograft biopsy: 11/20/2023  FINAL DIAGNOSIS   KIDNEY ALLOGRAFT, LEFT ILIAC FOSSA, PERCUTANEOUS CORE BIOPSY:  -- LIMITED SAMPLE, PREDOMINANTLY MEDULLA  -- FOCAL CRESCENTIC GLOMERULONEPHRITIS  -- CHANGES SUGGESTIVE OF IMMUNE COMPLEX GLOMERULONEPHRITIS  -- SEVERE ARTERIOLAR HYALINOSIS AND ARTERIOSCLEROSIS   -- THICKENED GLOMERULAR BASEMENT MEMBRANES BY ELECTRON MICROSCOPY (SEE  COMMENT)         Assessment/Plan      Mr. Bashir is a 68 y.o. male  who is here for follow up s/p kidney transplant.     TRANSPLANT DATE: 7/13/2013 (Kidney), 3/26/1994 (Kidney)        1. ESRD S/P kidney transplant   - impaired allograft function. Recent serum Cr ~4. Likely progressing to ESRD.  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   -Last urine protein/creatinine ratio is 5.22, recently taken off losartan 25 mg/d sec to ISIAH and hyperK. Cont to hold for now.   - improved hypervolemia on exam. Continue torsemide 40mg bid upon discharge home.  - Avoid nephrotoxic medications, NSAIDs, and IV contrast.  - pt will need referral to outpt vascular surgery for dialysis access placement. Has nonfunctioning AVF in [facility], weak pulse in RFA AVF     2. Immunosuppression  -Tacrolimus level last check was 5.8, acceptable. Goal Fk 5-8.   -Continue tac 2.5 mg bid, ont Imuran 50mg/d. change pred to 5 mg/d given elevated BP, hypervolemia issues     3. HTN urgency:   - BPs improved since admission but remain above goal (goal ~130/80). nifedipine incr to 60 mg bid. Consider incr Coreg to 50mg bid if HR allows. Cont other meds at current doses.  - will consdier resu,img low dose ARB if GFR remains stable as oputpt.      4.. Electrolytes:  - h/o hyperkalemia: ARB on hold. was on Lokelma 5 g/d as outpt. Currently on hold. Serum K acceptable.   - other metabolic parameters acceptable.      5. Bone Mineral Disease/Osteoporosis  - Ca, phos acceptbale.  1/2023. Consider changing Sensipar to 30mg every other day,     6.Anemia/Leukopenia:  - Hb 9.1. cont PO iron. Will start DARIANA if Hb <9.   - borderline low WBC, ~3k. CMV, BK, EBV PCR neg 1/17/24. Cont to monitor.    Rest of the management per primary team.  Can be discharged home from renal standpoint. follow up in renal txp clinic in 1-2 weeks.    Raad Rolle MD

## 2024-01-20 NOTE — PROGRESS NOTES
"Subjective   Hospital course:  Manolo Bashir is a 68 y.o. male with a PMHx of ESRD s/p 2x renal transplants (1992, 2013 in [facility], currently on prednisone, tacrolimus, azathioprine, last baseline Cr 2.5-2.8), IgG and IgA Lambda MGUS (s/p bone marrow biopsy 10/26/23), angina (last EF 60-65% 2/201), IDDM2 (last A1c 6.0), HTN, prostate cancer s/p radical prostatectomy, HCV (s/p Harvoni with undetectable viral load in 2019) presenting with generalized pain, oliguria, and ISIAH in the setting of hypertensive urgency.     In ED, initial workup significant for , glucose 202, creatinine 4.11, BUN 56, 2x neg trop, 3+ urine protein. ED gave patient given 1L LR and IV Dilaudid. Nephrology consulted and recommended IV Lasix 60mg for 2 days followed by IV Lasix 40mg BID with improvement in urine output. His home amlodipine was changed to nifedipine 60mg BID with marked improvement of his blood pressure. IV Lasix was discontinued once urine outputs improved and patient was continued adequate diuresis on PO torsemide 40mg BID.    Throughout hospital stay, patient continued to report ongoing back pain thought to be musculoskeletal in nature that has improved somewhat with tylenol PRN, Flexeril 5mg TID, lidocaine patches, and topical diclofenac gel. Back pain never completely subsided. Patient also had waxing and waning \"chest pain\" that he described as burning in the epigastric region. Multiple work ups with troponin and EKG looking for cardiac etiology were negative. Pain presumed to be GI in origin and patient was started on PO Protonix 40mg daily. He also received 1x simethicone and Pepcid 20mg.     Interval History:  Overnight, at 0445, night team was called about chest pain. Trop and EKG at the time were negative for ACS. Thought to be 2/2 GERD/heartburn and patient was given simethicone and Pepcid with some improvement.    This AM, patient continues to report ongoing \"chest pain\" that he describes as a burning sensation " localized around his epigastric region. He says it does not feel like pressure on his chest and is similar to the kind of the pain he has had in the past. He is also still complaining of back pain similar to the past couple of days despite addition of lidocaine patches and diclofenac gel.     Objective     Vitals:    01/20/24 0856   BP: 167/84   Pulse: 81   Resp: 16   Temp: 36.5 °C (97.7 °F)   SpO2: 94%       I/O last 3 completed shifts:  In: - (0 mL/kg)   Out: 650 (8 mL/kg) [Urine:650 (0.2 mL/kg/hr)]  Weight: 80.9 kg   No intake/output data recorded.       Physical Exam  Const: Well-nourished, Well-developed  Eyes: PERRL, no conjunctival injection, and symmetrical lids  HENMT: Atraumatic external nose and ears, Moist MM  Neck: Symmetric, trachea midline, No thyromegaly  CVS: +S1/S2, No murmurs, rubs, or gallops, Peripheral pulses 2+ and equal in all extremities, 2+ pitting edema bilateral LE (improving)  RESP: Unlabored respiratory effort, Clear to auscultation bilaterally  GI: Distended, nontender, No hepatosplenomegaly  MSK: Extremities w/o deformity or ttp, No cyanosis or clubbing  Skin: Warm, Dry, No rashes or lesions  Neuro: CNs II-XII grossly intact, Sensation grossly intact  Psych: Alert, & Oriented x3, Appropriate mood and affect        Scheduled medications  aspirin, 81 mg, oral, Daily  azaTHIOprine, 50 mg, oral, Daily  calcitriol, 0.25 mcg, oral, Daily  carvedilol, 37.5 mg, oral, BID  cholecalciferol, 1,000 Units, oral, Daily  [START ON 1/21/2024] cinacalcet, 30 mg, oral, Every other day  cyclobenzaprine, 5 mg, oral, TID  ferrous sulfate (325 mg ferrous sulfate), 65 mg of iron, oral, BID  hydrALAZINE, 100 mg, oral, TID  insulin glargine, 24 Units, subcutaneous, Nightly  insulin lispro, 0-5 Units, subcutaneous, TID with meals  isosorbide mononitrate ER, 60 mg, oral, Daily  lidocaine, 1 patch, transdermal, Daily  NIFEdipine ER, 60 mg, oral, BID  pantoprazole, 40 mg, oral, Daily before  breakfast  polyethylene glycol, 17 g, oral, Daily  pravastatin, 10 mg, oral, Nightly  predniSONE, 10 mg, oral, q AM  tacrolimus, 2.5 mg, oral, q12h ZOË  torsemide, 40 mg, oral, BID with meals          Results for orders placed or performed during the hospital encounter of 01/16/24 (from the past 24 hour(s))   POCT GLUCOSE   Result Value Ref Range    POCT Glucose 147 (H) 74 - 99 mg/dL   POCT GLUCOSE   Result Value Ref Range    POCT Glucose 328 (H) 74 - 99 mg/dL   POCT GLUCOSE   Result Value Ref Range    POCT Glucose 329 (H) 74 - 99 mg/dL   Magnesium   Result Value Ref Range    Magnesium 1.96 1.60 - 2.40 mg/dL   Troponin I, High Sensitivity   Result Value Ref Range    Troponin I, High Sensitivity 34 0 - 53 ng/L   CBC and Auto Differential   Result Value Ref Range    WBC 2.8 (L) 4.4 - 11.3 x10*3/uL    nRBC 0.0 0.0 - 0.0 /100 WBCs    RBC 3.24 (L) 4.50 - 5.90 x10*6/uL    Hemoglobin 9.1 (L) 13.5 - 17.5 g/dL    Hematocrit 28.3 (L) 41.0 - 52.0 %    MCV 87 80 - 100 fL    MCH 28.1 26.0 - 34.0 pg    MCHC 32.2 32.0 - 36.0 g/dL    RDW 13.3 11.5 - 14.5 %    Platelets 108 (L) 150 - 450 x10*3/uL    Neutrophils % 65.4 40.0 - 80.0 %    Immature Granulocytes %, Automated 0.7 0.0 - 0.9 %    Lymphocytes % 23.9 13.0 - 44.0 %    Monocytes % 8.2 2.0 - 10.0 %    Eosinophils % 1.4 0.0 - 6.0 %    Basophils % 0.4 0.0 - 2.0 %    Neutrophils Absolute 1.83 1.20 - 7.70 x10*3/uL    Immature Granulocytes Absolute, Automated 0.02 0.00 - 0.70 x10*3/uL    Lymphocytes Absolute 0.67 (L) 1.20 - 4.80 x10*3/uL    Monocytes Absolute 0.23 0.10 - 1.00 x10*3/uL    Eosinophils Absolute 0.04 0.00 - 0.70 x10*3/uL    Basophils Absolute 0.01 0.00 - 0.10 x10*3/uL   Renal Function Panel   Result Value Ref Range    Glucose 306 (H) 74 - 99 mg/dL    Sodium 138 136 - 145 mmol/L    Potassium 4.7 3.5 - 5.3 mmol/L    Chloride 106 98 - 107 mmol/L    Bicarbonate 26 21 - 32 mmol/L    Anion Gap 11 10 - 20 mmol/L    Urea Nitrogen 59 (H) 6 - 23 mg/dL    Creatinine 4.08 (H) 0.50 -  1.30 mg/dL    eGFR 15 (L) >60 mL/min/1.73m*2    Calcium 8.6 8.6 - 10.6 mg/dL    Phosphorus 3.2 2.5 - 4.9 mg/dL    Albumin 2.5 (L) 3.4 - 5.0 g/dL   POCT GLUCOSE   Result Value Ref Range    POCT Glucose 188 (H) 74 - 99 mg/dL         Assessment/Plan   Manolo Bashir is a 68 y.o. male with a PMHx of ESRD s/p 2x renal transplants (1992, 2013, currently on prednisone, tacrolimus, azathioprine, last baseline Cr 2.5-2.8), MGUS, pancytopenia, angina (last EF 60-65% 2/201), IDDM2 (last A1c 6.0), HTN, prostate cancer s/p radical prostatectomy, HCV (treated and RNA not detected last 10/18/23) presenting with oliguria and ISIAH in the setting of hypertensive urgency. Patient is clearly volume overloaded on exam and labs. Kidney function is below baseline. Etiology likely cardiorenal: renal graft failure vs heart failure (HFpEF) with secondary renal dysfunction. Patient will be admitted with IV diuresis to reduce volume overload with renal US to help assess for graft functioning. Will speak with nephrology regarding recommendations for diuresis and anti-hypertensive given transplant history. Given resolution of chest pain, 3x negative trops, and no consistent ischemic EKG changes, very low likelihood of ACS at this time. Will not continue Plavix s/p loading dose.     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 Transplant kidney resistive indices remain near the upper limits of normal, similar to prior. No evidence of new hydronephrosis.  2. Small amount of nonspecific  fluid is present along the transplanted kidney in the left iliac fossa, correlate with fluid volume status.  3. Nonobstructive renal calculi  - PTH elevated 225 6/2023 > 159.7 on 1/18  - CMV, BK, EBV PCRs negative  PLAN:  - Consulted nephrology, appreciate recs  - Stopped IV Lasix 40mg BID  - Continue torsemide 40mg PO BID  - Continue home Vit D, iron  - Change cinacalcet dosing to 30mg every other day  - Referral to outpt vascular surgery for dialysis access placement   - qAM RFP, Mg, CBC  - Strict I/Os  - Daily weights     # DM2  - Glucose 200 on admission  - Last A1c 6.0 on 10/26/23  - Insulin dependent, on 24 units of lantus + SSI at home  PLAN:  - Continue home 24 units lantus qHS  - SSI + Accucheks  - Hypoglycemia protocol  - Continue pravastatin 10mg qHS     # Chronic hypertension  # Hypertensive urgency  # Angina  - Presented with BP>180s, baseline is ~140s  - Home meds: ASA 81mg, coreg 37.5mg BID, amlodipine 10mg daily, hydralazine 100mg TID, imdur 60 daily  PLAN:  - Goal BP <140 systolic  - Resume home antihypertensive regimen  - Stopped home amlodipine 10mg daily  - Increase nifedipine to 60mg BID     # MGUS  # Immunosuppression  # Pancytopenia  - Low cell counts on admission: WBC 3.1, Hb 9.7, Plt 111  - Home meds: tacrolimus 2.5mg BID, prednisone 10mg daily, and azathioprine 50mg daily  - Tacro on 1/17 was 13.5  - Repeat tacro on 1/19 was 5.8, within goal 5-8  PLAN:  - Decrease prednisone to 5mg daily (per nephro given high BP + volume overload)  - Continue remainder of home immunosuppressive regimen     # Back pain  - Unclear etiology, likely musculoskeletal given long-standing, relatively non-specific, and TTP  PLAN:  - Tylenol 650mg q6hr PRN  - Flexeril 5mg TID  - Start gabapentin 300mg qHS  - Lidocaine patches PRN  - Diclofenac gel PRN    # Abdominal pain  - Waxing and waning burning epigastric pain  - Negative cardiac workup > likely GERD/heartburn  PLAN:  - Protonix 40mg daily     Fluids: 2L fluid  restriction  Nutrition: Potassium restricted diet  DVT ppx: subQ Heparin  Dispo: Likely home tomorrow - pending improvement output and BP  Surrogate Decision Maker if Needed: Amalia Enriquez [phone]   CODE STATUS: FULL CODE     Patient seen and discussed with Dr. Ojeda.     Patrice Anton MS4  1/20/24

## 2024-01-20 NOTE — CARE PLAN
The patient's goals for the shift include  Free from pain    The clinical goals for the shift include Pain free    Over the shift, the patient did not make progress toward the following goals. Barriers to progression include back pain. Recommendations to address these barriers include medications.

## 2024-01-20 NOTE — DISCHARGE INSTRUCTIONS
Dear Mr. Bashir,    We hope this message finds you well. Here's a summary of your recent hospital stay and important instructions for your ongoing care:     You came in due to generalized pain, decreased urine output, and acute kidney injury related to hypertensive urgency. IV fluids and pain management were provided, and your home blood pressure medication was adjusted. Nephrology recommended and managed IV Lasix to improve urine output. Blood pressure medication was changed to nifedipine, leading to significant improvement. Your back pain, presumed musculoskeletal, was managed with medications, and further follow-up is recommended with your PCP. Chest pain investigations ruled out cardiac causes. Protonix was started for presumed GI origin, and additional medications were given.    Follow-Up:  Primary Care: Follow up regarding your ongoing back pain and overall well-being.  Vascular Surgery: Further assessment and management related to your condition.  Other Appointments: As listed above, including nephrology for continued kidney health.    Remember, your health is our priority. If you experience any concerning symptoms or have questions, don't hesitate to reach out to your healthcare team.    Wishing you a smooth recovery and improved well-being,  Your Healthcare Team

## 2024-01-21 VITALS
SYSTOLIC BLOOD PRESSURE: 151 MMHG | DIASTOLIC BLOOD PRESSURE: 83 MMHG | BODY MASS INDEX: 26.34 KG/M2 | RESPIRATION RATE: 18 BRPM | WEIGHT: 173.8 LBS | TEMPERATURE: 98.2 F | OXYGEN SATURATION: 99 % | HEIGHT: 68 IN | HEART RATE: 68 BPM

## 2024-01-21 PROBLEM — M54.9 BACK PAIN: Status: ACTIVE | Noted: 2024-01-21

## 2024-01-21 LAB
ALBUMIN SERPL BCP-MCNC: 2.5 G/DL (ref 3.4–5)
ANION GAP SERPL CALC-SCNC: 9 MMOL/L (ref 10–20)
BUN SERPL-MCNC: 55 MG/DL (ref 6–23)
CALCIUM SERPL-MCNC: 9.3 MG/DL (ref 8.6–10.6)
CHLORIDE SERPL-SCNC: 105 MMOL/L (ref 98–107)
CO2 SERPL-SCNC: 29 MMOL/L (ref 21–32)
CREAT SERPL-MCNC: 4.28 MG/DL (ref 0.5–1.3)
EGFRCR SERPLBLD CKD-EPI 2021: 14 ML/MIN/1.73M*2
ERYTHROCYTE [DISTWIDTH] IN BLOOD BY AUTOMATED COUNT: 13.4 % (ref 11.5–14.5)
EST. AVERAGE GLUCOSE BLD GHB EST-MCNC: 174 MG/DL
GLUCOSE BLD MANUAL STRIP-MCNC: 237 MG/DL (ref 74–99)
GLUCOSE BLD MANUAL STRIP-MCNC: 251 MG/DL (ref 74–99)
GLUCOSE SERPL-MCNC: 296 MG/DL (ref 74–99)
HBA1C MFR BLD: 7.7 %
HCT VFR BLD AUTO: 29.4 % (ref 41–52)
HGB BLD-MCNC: 9.3 G/DL (ref 13.5–17.5)
MAGNESIUM SERPL-MCNC: 1.91 MG/DL (ref 1.6–2.4)
MCH RBC QN AUTO: 27.9 PG (ref 26–34)
MCHC RBC AUTO-ENTMCNC: 31.6 G/DL (ref 32–36)
MCV RBC AUTO: 88 FL (ref 80–100)
NRBC BLD-RTO: 0 /100 WBCS (ref 0–0)
PHOSPHATE SERPL-MCNC: 3.5 MG/DL (ref 2.5–4.9)
PLATELET # BLD AUTO: 99 X10*3/UL (ref 150–450)
POTASSIUM SERPL-SCNC: 4.2 MMOL/L (ref 3.5–5.3)
RBC # BLD AUTO: 3.33 X10*6/UL (ref 4.5–5.9)
SODIUM SERPL-SCNC: 139 MMOL/L (ref 136–145)
WBC # BLD AUTO: 2.5 X10*3/UL (ref 4.4–11.3)

## 2024-01-21 PROCEDURE — 80069 RENAL FUNCTION PANEL: CPT | Performed by: STUDENT IN AN ORGANIZED HEALTH CARE EDUCATION/TRAINING PROGRAM

## 2024-01-21 PROCEDURE — 82947 ASSAY GLUCOSE BLOOD QUANT: CPT

## 2024-01-21 PROCEDURE — 2500000001 HC RX 250 WO HCPCS SELF ADMINISTERED DRUGS (ALT 637 FOR MEDICARE OP): Performed by: STUDENT IN AN ORGANIZED HEALTH CARE EDUCATION/TRAINING PROGRAM

## 2024-01-21 PROCEDURE — 2500000001 HC RX 250 WO HCPCS SELF ADMINISTERED DRUGS (ALT 637 FOR MEDICARE OP)

## 2024-01-21 PROCEDURE — 36415 COLL VENOUS BLD VENIPUNCTURE: CPT | Performed by: STUDENT IN AN ORGANIZED HEALTH CARE EDUCATION/TRAINING PROGRAM

## 2024-01-21 PROCEDURE — 85027 COMPLETE CBC AUTOMATED: CPT | Performed by: STUDENT IN AN ORGANIZED HEALTH CARE EDUCATION/TRAINING PROGRAM

## 2024-01-21 PROCEDURE — 2500000004 HC RX 250 GENERAL PHARMACY W/ HCPCS (ALT 636 FOR OP/ED)

## 2024-01-21 PROCEDURE — 2500000005 HC RX 250 GENERAL PHARMACY W/O HCPCS: Performed by: STUDENT IN AN ORGANIZED HEALTH CARE EDUCATION/TRAINING PROGRAM

## 2024-01-21 PROCEDURE — 2500000004 HC RX 250 GENERAL PHARMACY W/ HCPCS (ALT 636 FOR OP/ED): Performed by: STUDENT IN AN ORGANIZED HEALTH CARE EDUCATION/TRAINING PROGRAM

## 2024-01-21 PROCEDURE — 99239 HOSP IP/OBS DSCHRG MGMT >30: CPT | Performed by: STUDENT IN AN ORGANIZED HEALTH CARE EDUCATION/TRAINING PROGRAM

## 2024-01-21 PROCEDURE — 83735 ASSAY OF MAGNESIUM: CPT

## 2024-01-21 PROCEDURE — 36415 COLL VENOUS BLD VENIPUNCTURE: CPT

## 2024-01-21 PROCEDURE — 2500000002 HC RX 250 W HCPCS SELF ADMINISTERED DRUGS (ALT 637 FOR MEDICARE OP, ALT 636 FOR OP/ED): Performed by: STUDENT IN AN ORGANIZED HEALTH CARE EDUCATION/TRAINING PROGRAM

## 2024-01-21 RX ORDER — INSULIN LISPRO 100 [IU]/ML
0-10 INJECTION, SOLUTION INTRAVENOUS; SUBCUTANEOUS
Status: DISCONTINUED | OUTPATIENT
Start: 2024-01-21 | End: 2024-01-21 | Stop reason: HOSPADM

## 2024-01-21 RX ORDER — PREDNISONE 5 MG/1
5 TABLET ORAL EVERY MORNING
Qty: 21 TABLET | Refills: 1 | Status: SHIPPED | OUTPATIENT
Start: 2024-01-22 | End: 2024-05-22 | Stop reason: SDUPTHER

## 2024-01-21 RX ORDER — CARVEDILOL 12.5 MG/1
37.5 TABLET ORAL 2 TIMES DAILY
Qty: 180 TABLET | Refills: 1 | Status: SHIPPED | OUTPATIENT
Start: 2024-01-21 | End: 2024-03-08

## 2024-01-21 RX ORDER — NIFEDIPINE 60 MG/1
60 TABLET, FILM COATED, EXTENDED RELEASE ORAL 2 TIMES DAILY
Qty: 60 TABLET | Refills: 1 | Status: SHIPPED | OUTPATIENT
Start: 2024-01-21 | End: 2024-03-15

## 2024-01-21 RX ORDER — TORSEMIDE 20 MG/1
40 TABLET ORAL
Qty: 120 TABLET | Refills: 1 | Status: SHIPPED | OUTPATIENT
Start: 2024-01-21 | End: 2024-03-16 | Stop reason: HOSPADM

## 2024-01-21 RX ORDER — PANTOPRAZOLE SODIUM 40 MG/1
40 TABLET, DELAYED RELEASE ORAL
Qty: 30 TABLET | Refills: 1 | Status: SHIPPED | OUTPATIENT
Start: 2024-01-22 | End: 2024-04-09 | Stop reason: SDUPTHER

## 2024-01-21 RX ORDER — CYCLOBENZAPRINE HCL 10 MG
10 TABLET ORAL 3 TIMES DAILY
Status: DISCONTINUED | OUTPATIENT
Start: 2024-01-21 | End: 2024-01-21 | Stop reason: HOSPADM

## 2024-01-21 RX ORDER — LIDOCAINE 560 MG/1
1 PATCH PERCUTANEOUS; TOPICAL; TRANSDERMAL DAILY
Qty: 5 PATCH | Refills: 0 | Status: SHIPPED | OUTPATIENT
Start: 2024-01-22 | End: 2024-02-28

## 2024-01-21 RX ORDER — MAGNESIUM SULFATE HEPTAHYDRATE 40 MG/ML
2 INJECTION, SOLUTION INTRAVENOUS ONCE
Status: COMPLETED | OUTPATIENT
Start: 2024-01-21 | End: 2024-01-21

## 2024-01-21 RX ORDER — CYCLOBENZAPRINE HCL 10 MG
10 TABLET ORAL 3 TIMES DAILY
Qty: 90 TABLET | Refills: 0 | Status: SHIPPED | OUTPATIENT
Start: 2024-01-21 | End: 2024-02-07 | Stop reason: WASHOUT

## 2024-01-21 RX ORDER — DICLOFENAC SODIUM 10 MG/G
4 GEL TOPICAL 4 TIMES DAILY PRN
Qty: 100 G | Refills: 0 | Status: SHIPPED | OUTPATIENT
Start: 2024-01-21 | End: 2024-03-16 | Stop reason: HOSPADM

## 2024-01-21 RX ORDER — MAGNESIUM SULFATE HEPTAHYDRATE 40 MG/ML
2 INJECTION, SOLUTION INTRAVENOUS ONCE
Status: CANCELLED | OUTPATIENT
Start: 2024-01-21 | End: 2024-01-21

## 2024-01-21 RX ORDER — GABAPENTIN 300 MG/1
300 CAPSULE ORAL NIGHTLY
Qty: 30 CAPSULE | Refills: 0 | Status: SHIPPED | OUTPATIENT
Start: 2024-01-21 | End: 2024-01-25 | Stop reason: ALTCHOICE

## 2024-01-21 RX ORDER — CINACALCET 30 MG/1
30 TABLET, FILM COATED ORAL EVERY OTHER DAY
Qty: 15 TABLET | Refills: 0 | Status: SHIPPED | OUTPATIENT
Start: 2024-01-23 | End: 2024-01-25 | Stop reason: ALTCHOICE

## 2024-01-21 RX ORDER — ISOSORBIDE MONONITRATE 60 MG/1
60 TABLET, EXTENDED RELEASE ORAL DAILY
Qty: 30 TABLET | Refills: 1 | Status: SHIPPED | OUTPATIENT
Start: 2024-01-22 | End: 2024-04-16 | Stop reason: SDUPTHER

## 2024-01-21 RX ORDER — INSULIN GLARGINE 100 [IU]/ML
30 INJECTION, SOLUTION SUBCUTANEOUS NIGHTLY
Status: CANCELLED | OUTPATIENT
Start: 2024-01-21

## 2024-01-21 RX ADMIN — AZATHIOPRINE 50 MG: 50 TABLET ORAL at 08:41

## 2024-01-21 RX ADMIN — PREDNISONE 5 MG: 5 TABLET ORAL at 08:41

## 2024-01-21 RX ADMIN — TACROLIMUS 2.5 MG: 0.5 CAPSULE ORAL at 18:30

## 2024-01-21 RX ADMIN — CALCITRIOL CAPSULES 0.25 MCG 0.25 MCG: 0.25 CAPSULE ORAL at 08:42

## 2024-01-21 RX ADMIN — CINACALCET 30 MG: 30 TABLET ORAL at 08:42

## 2024-01-21 RX ADMIN — INSULIN LISPRO 2 UNITS: 100 INJECTION, SOLUTION INTRAVENOUS; SUBCUTANEOUS at 08:43

## 2024-01-21 RX ADMIN — TORSEMIDE 40 MG: 20 TABLET ORAL at 08:41

## 2024-01-21 RX ADMIN — LIDOCAINE 1 PATCH: 4 PATCH TOPICAL at 08:42

## 2024-01-21 RX ADMIN — MAGNESIUM SULFATE HEPTAHYDRATE 2 G: 40 INJECTION, SOLUTION INTRAVENOUS at 16:43

## 2024-01-21 RX ADMIN — ISOSORBIDE MONONITRATE 60 MG: 60 TABLET, EXTENDED RELEASE ORAL at 08:41

## 2024-01-21 RX ADMIN — CYCLOBENZAPRINE 5 MG: 10 TABLET, FILM COATED ORAL at 08:40

## 2024-01-21 RX ADMIN — NIFEDIPINE 60 MG: 60 TABLET, FILM COATED, EXTENDED RELEASE ORAL at 08:41

## 2024-01-21 RX ADMIN — INSULIN LISPRO 3 UNITS: 100 INJECTION, SOLUTION INTRAVENOUS; SUBCUTANEOUS at 12:34

## 2024-01-21 RX ADMIN — PANTOPRAZOLE SODIUM 40 MG: 40 TABLET, DELAYED RELEASE ORAL at 05:24

## 2024-01-21 RX ADMIN — TORSEMIDE 40 MG: 20 TABLET ORAL at 16:37

## 2024-01-21 RX ADMIN — FERROUS SULFATE TAB 325 MG (65 MG ELEMENTAL FE) 1 TABLET: 325 (65 FE) TAB at 08:40

## 2024-01-21 RX ADMIN — INSULIN LISPRO 6 UNITS: 100 INJECTION, SOLUTION INTRAVENOUS; SUBCUTANEOUS at 16:39

## 2024-01-21 RX ADMIN — CARVEDILOL 37.5 MG: 12.5 TABLET, FILM COATED ORAL at 08:40

## 2024-01-21 RX ADMIN — Medication 1000 UNITS: at 08:40

## 2024-01-21 RX ADMIN — HYDRALAZINE HYDROCHLORIDE 100 MG: 50 TABLET ORAL at 08:40

## 2024-01-21 RX ADMIN — ASPIRIN 81 MG CHEWABLE TABLET 81 MG: 81 TABLET CHEWABLE at 08:40

## 2024-01-21 RX ADMIN — CYCLOBENZAPRINE 10 MG: 10 TABLET, FILM COATED ORAL at 16:37

## 2024-01-21 RX ADMIN — TACROLIMUS 2.5 MG: 0.5 CAPSULE ORAL at 05:24

## 2024-01-21 RX ADMIN — HYDRALAZINE HYDROCHLORIDE 100 MG: 50 TABLET ORAL at 16:37

## 2024-01-21 ASSESSMENT — PAIN - FUNCTIONAL ASSESSMENT: PAIN_FUNCTIONAL_ASSESSMENT: 0-10

## 2024-01-21 ASSESSMENT — PAIN DESCRIPTION - DESCRIPTORS: DESCRIPTORS: ACHING;NAGGING

## 2024-01-21 ASSESSMENT — PAIN SCALES - GENERAL: PAINLEVEL_OUTOF10: 9

## 2024-01-21 NOTE — CARE PLAN
The patient's goals for the shift include      The clinical goals for the shift include patient will have pain controlled this shift      Problem: Pain  Goal: My pain/discomfort is manageable  Outcome: Progressing     Problem: Daily Care  Goal: Daily care needs are met  Outcome: Progressing

## 2024-01-21 NOTE — CARE PLAN
The patient's goals for the shift include      The clinical goals for the shift include pain control      Problem: Pain  Goal: My pain/discomfort is manageable  Outcome: Progressing     Problem: Safety  Goal: Patient will be injury free during hospitalization  Outcome: Progressing  Goal: I will remain free of falls  Outcome: Progressing     Problem: Daily Care  Goal: Daily care needs are met  Outcome: Progressing     Problem: Psychosocial Needs  Goal: Demonstrates ability to cope with hospitalization/illness  Outcome: Progressing  Goal: Collaborate with me, my family, and caregiver to identify my specific goals  Outcome: Progressing     Problem: Discharge Barriers  Goal: My discharge needs are met  Outcome: Progressing     Problem: Pain  Goal: Takes deep breaths with improved pain control throughout the shift  Outcome: Progressing  Goal: Turns in bed with improved pain control throughout the shift  Outcome: Progressing  Goal: Walks with improved pain control throughout the shift  Outcome: Progressing  Goal: Performs ADL's with improved pain control throughout shift  Outcome: Progressing  Goal: Participates in PT with improved pain control throughout the shift  Outcome: Progressing  Goal: Free from opioid side effects throughout the shift  Outcome: Progressing  Goal: Free from acute confusion related to pain meds throughout the shift  Outcome: Progressing

## 2024-01-21 NOTE — PROGRESS NOTES
Discharge instructions given to patient.  All questions and concerns answered to his satisfaction.  Belongings paced and sent with patient.  Patient in stable condition upon discharge.

## 2024-01-21 NOTE — PROGRESS NOTES
"Subjective   Hospital course:  Manolo Bashir is a 68 y.o. male with a PMHx of ESRD s/p 2x renal transplants (1992, 2013 in [facility], currently on prednisone, tacrolimus, azathioprine, last baseline Cr 2.5-2.8), IgG and IgA Lambda MGUS (s/p bone marrow biopsy 10/26/23), angina (last EF 60-65% 2/201), IDDM2 (last A1c 6.0), HTN, prostate cancer s/p radical prostatectomy, HCV (s/p Harvoni with undetectable viral load in 2019) presenting with generalized pain, oliguria, and ISIAH in the setting of hypertensive urgency.     In ED, initial workup significant for , glucose 202, creatinine 4.11, BUN 56, 2x neg trop, 3+ urine protein. ED gave patient given 1L LR and IV Dilaudid. Nephrology consulted and recommended IV Lasix 60mg for 2 days followed by IV Lasix 40mg BID with improvement in urine output. His home amlodipine was changed to nifedipine 60mg BID with marked improvement of his blood pressure. IV Lasix was discontinued once urine outputs improved and patient was continued adequate diuresis on PO torsemide 40mg BID.     Throughout hospital stay, patient continued to report ongoing back pain thought to be musculoskeletal in nature that has improved somewhat with tylenol PRN, Flexeril 5mg TID, lidocaine patches, and topical diclofenac gel. Back pain never completely subsided. Patient also had waxing and waning \"chest pain\" that he described as burning in the epigastric region. Multiple work ups with troponin and EKG looking for cardiac etiology were negative. Pain presumed to be GI in origin and patient was started on PO Protonix 40mg daily. He also received 1x simethicone and Pepcid 20mg.     Interval History:  No acute events overnight. Patient reports feeling a bit better today. His back pain has improved slightly and he is no longer experiencing the burning chest pain that he had yesterday. He is otherwise feeling about the same and denies any new symptoms or concerns.     Objective     Vitals:    01/21/24 0811 "   BP: 136/64   Pulse: 63   Resp: 16   Temp: 36.6 °C (97.9 °F)   SpO2: 92%       I/O last 3 completed shifts:  In: 360 (4.6 mL/kg) [P.O.:360]  Out: 2250 (28.5 mL/kg) [Urine:2250 (0.8 mL/kg/hr)]  Weight: 78.8 kg   I/O this shift:  In: -   Out: 300 [Urine:300]       Physical Exam  Const: Well-nourished, Well-developed  Eyes: PERRL, no conjunctival injection, and symmetrical lids  HENMT: Atraumatic external nose and ears, Moist MM  Neck: Symmetric, trachea midline, No thyromegaly  CVS: +S1/S2, No murmurs, rubs, or gallops, Peripheral pulses 2+ and equal in all extremities, 2+ pitting edema bilateral LE (improving)   RESP: Unlabored respiratory effort, Clear to auscultation bilaterally  GI: Nontender/Nondistended, No hepatosplenomegaly  MSK: Extremities w/o deformity or ttp, No cyanosis or clubbing  Skin: Warm, Dry, No rashes or lesions  Neuro: CNs II-XII grossly intact, Sensation grossly intact  Psych: Alert, & Oriented x3, Appropriate mood and affect       Scheduled medications  aspirin, 81 mg, oral, Daily  azaTHIOprine, 50 mg, oral, Daily  calcitriol, 0.25 mcg, oral, Daily  carvedilol, 37.5 mg, oral, BID  cholecalciferol, 1,000 Units, oral, Daily  cinacalcet, 30 mg, oral, Every other day  cyclobenzaprine, 5 mg, oral, TID  ferrous sulfate (325 mg ferrous sulfate), 65 mg of iron, oral, BID  gabapentin, 300 mg, oral, Nightly  hydrALAZINE, 100 mg, oral, TID  insulin glargine, 24 Units, subcutaneous, Nightly  insulin lispro, 0-5 Units, subcutaneous, TID with meals  isosorbide mononitrate ER, 60 mg, oral, Daily  lidocaine, 1 patch, transdermal, Daily  NIFEdipine ER, 60 mg, oral, BID  pantoprazole, 40 mg, oral, Daily before breakfast  polyethylene glycol, 17 g, oral, Daily  pravastatin, 10 mg, oral, Nightly  predniSONE, 5 mg, oral, q AM  tacrolimus, 2.5 mg, oral, q12h ZOË  torsemide, 40 mg, oral, BID with meals          Results for orders placed or performed during the hospital encounter of 01/16/24 (from the past 24  hour(s))   POCT GLUCOSE   Result Value Ref Range    POCT Glucose 207 (H) 74 - 99 mg/dL   POCT GLUCOSE   Result Value Ref Range    POCT Glucose 332 (H) 74 - 99 mg/dL   POCT GLUCOSE   Result Value Ref Range    POCT Glucose 335 (H) 74 - 99 mg/dL   Magnesium   Result Value Ref Range    Magnesium 1.91 1.60 - 2.40 mg/dL   POCT GLUCOSE   Result Value Ref Range    POCT Glucose 237 (H) 74 - 99 mg/dL   POCT GLUCOSE   Result Value Ref Range    POCT Glucose 251 (H) 74 - 99 mg/dL         Assessment/Plan   Manolo Bashir is a 68 y.o. male with a PMHx of ESRD s/p 2x renal transplants (1992, 2013, currently on prednisone, tacrolimus, azathioprine, last baseline Cr 2.5-2.8), MGUS, pancytopenia, angina (last EF 60-65% 2/201), IDDM2 (last A1c 6.0), HTN, prostate cancer s/p radical prostatectomy, HCV (treated and RNA not detected last 10/18/23) presenting with oliguria and ISIAH in the setting of hypertensive urgency. Patient is clearly volume overloaded on exam and labs. Kidney function is below baseline. Etiology likely cardiorenal: renal graft failure vs heart failure (HFpEF) with secondary renal dysfunction. Patient will be admitted with IV diuresis to reduce volume overload with renal US to help assess for graft functioning. Will speak with nephrology regarding recommendations for diuresis and anti-hypertensive given transplant history. Given resolution of chest pain, 3x negative trops, and no consistent ischemic EKG changes, very low likelihood of ACS at this time. Will not continue Plavix s/p loading dose.     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 Transplant kidney resistive indices remain near the upper limits of normal, similar to prior. No evidence of new hydronephrosis.  2. Small amount of nonspecific fluid is present along the transplanted kidney in the left iliac fossa, correlate with fluid volume status.  3. Nonobstructive renal calculi  - PTH elevated 225 6/2023 > 159.7 on 1/18  - CMV, BK, EBV PCRs negative  PLAN:  - Consulted nephrology, appreciate recs  - Stopped IV Lasix 40mg BID  - Continue torsemide 40mg PO BID  - Continue home Vit D, iron  - Changed cinacalcet dosing to 30mg every other day  - Referral to outpt vascular surgery for dialysis access placement   - qAM RFP, Mg, CBC  - Strict I/Os  - Daily weights     # DM2  - Glucose 200 on admission  - Last A1c 6.0 on 10/26/23  - Insulin dependent, on 24 units of lantus + SSI at home  PLAN:  - Continue home 24 units lantus qHS  - Moderate intensity SSI + Accucheks  - Hypoglycemia protocol  - Continue pravastatin 10mg qHS     # Chronic hypertension  # Hypertensive urgency  # Angina  - Presented with BP>180s, baseline is ~140s  - Home meds: ASA 81mg, coreg 37.5mg BID, amlodipine 10mg daily, hydralazine 100mg TID, imdur 60 daily  PLAN:  - Goal BP <140 systolic  - Resume home antihypertensive regimen  - Stopped home amlodipine 10mg daily  - Continue nifedipine to 60mg BID  - Will consider additional 30mg imdur given BP still above goal     # MGUS  # Immunosuppression  # Pancytopenia  - Low cell counts on admission: WBC 3.1, Hb 9.7, Plt 111  - Home meds: tacrolimus 2.5mg BID, prednisone 10mg daily, and azathioprine 50mg daily  - Tacro on 1/17 was 13.5  - Repeat tacro on 1/19 was 5.8, within goal 5-8  PLAN:  - Decreased prednisone to 5mg daily (per nephro given high BP + volume overload)  - Continue remainder of home immunosuppressive regimen     # Back pain  - Unclear etiology, likely musculoskeletal given  long-standing, relatively non-specific, and TTP  PLAN:  - Tylenol 650mg q6hr PRN  - Increase Flexeril 10mg TID  - Continue gabapentin 300mg qHS  - Lidocaine patches PRN  - Diclofenac gel PRN     # Abdominal pain  - Waxing and waning burning epigastric pain  - Negative cardiac workup > likely GERD/heartburn  PLAN:  - Protonix 40mg daily     Fluids: 2L fluid restriction  Nutrition: Potassium restricted diet  DVT ppx: subQ Heparin  Dispo: Likely home tomorrow - pending improvement output and BP  Surrogate Decision Maker if Needed: Amalia Enriquez [phone]   CODE STATUS: FULL CODE     Patient seen and discussed with Dr. Ojeda.    Patrice Anton MS4  1/21/24

## 2024-01-22 ENCOUNTER — PHARMACY VISIT (OUTPATIENT)
Dept: PHARMACY | Facility: CLINIC | Age: 69
End: 2024-01-22
Payer: MEDICAID

## 2024-01-22 ENCOUNTER — DOCUMENTATION (OUTPATIENT)
Dept: INTERNAL MEDICINE | Facility: HOSPITAL | Age: 69
End: 2024-01-22
Payer: COMMERCIAL

## 2024-01-22 LAB
ATRIAL RATE: 74 BPM
P AXIS: 77 DEGREES
P OFFSET: 193 MS
P ONSET: 126 MS
PR INTERVAL: 180 MS
Q ONSET: 216 MS
QRS COUNT: 12 BEATS
QRS DURATION: 138 MS
QT INTERVAL: 398 MS
QTC CALCULATION(BAZETT): 441 MS
QTC FREDERICIA: 427 MS
R AXIS: -19 DEGREES
T AXIS: 68 DEGREES
T OFFSET: 415 MS
VENTRICULAR RATE: 74 BPM

## 2024-01-22 PROCEDURE — RXMED WILLOW AMBULATORY MEDICATION CHARGE

## 2024-01-22 NOTE — DISCHARGE SUMMARY
Discharge Diagnosis  Renal function impairment    Issues Requiring Follow-Up  - Vascular surgery: patient will need to be evaluated for vascular access for future dialysis    - Nephrology: Patient's renal function, urine output, and blood pressure will need to be closely monitored and managed to ensure adequate graft perfusion and functioning. Immunosuppression regimen was slightly adjusted so may need to be reevaluated at future visit.     - PCP: In collaboration with nephrology, patient's blood pressure will need to be closely managed to maintain adequate renal perfusion without causing injury from hypertension. He also has symptoms suggestive of GERD as well as non-remitting back pain that has proven difficult to alleviate despite multimodal pain regimen. Consider referring to chronic pain specialist.    Test Results Pending At Discharge  Pending Labs       No current pending labs.            Hospital Course  Manolo Bashir is a 68 y.o. male with a PMHx of ESRD s/p 2x renal transplants (1992, 2013 in [facility], currently on prednisone, tacrolimus, azathioprine, last baseline Cr 2.5-2.8), IgG and IgA Lambda MGUS (s/p bone marrow biopsy 10/26/23), angina (last EF 60-65% 2/201), IDDM2 (last A1c 6.0), HTN, prostate cancer s/p radical prostatectomy, HCV (s/p Harvoni with undetectable viral load in 2019) presenting with generalized pain, oliguria, and ISIAH in the setting of hypertensive urgency.     In ED, initial workup significant for , glucose 202, creatinine 4.11, BUN 56, 2x neg trop, 3+ urine protein. ED gave patient given 1L LR and IV Dilaudid. Nephrology consulted and recommended IV Lasix 60mg for 2 days followed by IV Lasix 40mg BID with improvement in urine output. His home amlodipine was changed to nifedipine 60mg BID with marked improvement of his blood pressure. IV Lasix was discontinued once urine outputs improved and patient was continued adequate diuresis on PO torsemide 40mg BID.    Throughout hospital  "stay, patient continued to report ongoing back pain thought to be musculoskeletal in nature that has improved somewhat with tylenol PRN, Flexeril 5mg TID, lidocaine patches, and topical diclofenac gel. Back pain never completely subsided. Patient also had waxing and waning \"chest pain\" that he described as burning in the epigastric region. Multiple work ups with troponin and EKG looking for cardiac etiology were negative. Pain presumed to be GI in origin and patient was started on PO Protonix 40mg daily. He also received 1x simethicone and Pepcid 20mg. He will follow up with PCP regarding back pain and with vascular surgery. All other appointments are listed above.    Pertinent Physical Exam At Time of Discharge  Const: Well-nourished, Well-developed  Eyes: PERRL, no conjunctival injection, and symmetrical lids  HENMT: Atraumatic external nose and ears, Moist MM  Neck: Symmetric, trachea midline, No thyromegaly  CVS: +S1/S2, No murmurs, rubs, or gallops, Peripheral pulses 2+ and equal in all extremities, 2+ pitting edema bilateral LE (improved from admission)  RESP: Unlabored respiratory effort, Clear to auscultation bilaterally  GI: Nontender/Nondistended, No hepatosplenomegaly  MSK: Extremities w/o deformity or ttp, No cyanosis or clubbing  Skin: Warm, Dry, No rashes or lesions  Neuro: CNs II-XII grossly intact, Sensation grossly intact  Psych: Alert, & Oriented x3, Appropriate mood and affect    Home Medications     Medication List      START taking these medications     cyclobenzaprine 10 mg tablet; Commonly known as: Flexeril; Take 1 tablet   (10 mg) by mouth 3 times a day.   diclofenac sodium 1 % gel gel; Commonly known as: Voltaren; Apply 1   Application (4 grams)  topically 4 times a day as needed (back pain).   gabapentin 300 mg capsule; Commonly known as: Neurontin; Take 1 capsule   (300 mg) by mouth once daily at bedtime.   lidocaine 4 % patch; Place 1 patch over 12 hours on the skin once daily.   Remove " & discard patch within 12 hours or as directed by MD. Do not start   before January 22, 2024.   NIFEdipine ER 60 mg 24 hr tablet; Commonly known as: Adalat CC; Take 1   tablet (60 mg) by mouth 2 times a day. Do not crush, chew, or split.   pantoprazole 40 mg EC tablet; Commonly known as: ProtoNix; Take 1 tablet   (40 mg) by mouth once daily in the morning. Take before meals. Do not   crush, chew, or split. Do not start before January 22, 2024.   torsemide 20 mg tablet; Commonly known as: Demadex; Take 2 tablets (40   mg) by mouth 2 times a day with meals.     CHANGE how you take these medications     cinacalcet 30 mg tablet; Commonly known as: Sensipar; Take 1 tablet (30   mg) by mouth every other day. Take with food or shortly afer a meal.   Swallow tablet whole; do not break or divide. Do not start before January 23, 2024.; Start taking on: January 23, 2024; What changed: when to take   this, additional instructions   isosorbide mononitrate ER 60 mg 24 hr tablet; Commonly known as: Imdur;   Take 1 tablet (60 mg) by mouth once daily. Do not crush or chew. Do not   start before January 22, 2024.; What changed: additional instructions   predniSONE 5 mg tablet; Commonly known as: Deltasone; Take 1 tablet (5   mg) by mouth once daily in the morning. Do not start before January 22, 2024.; What changed: how much to take   Prograf 0.5 mg capsule; Take 2.5 mg by mouth every 12 hours.; What   changed: Another medication with the same name was removed. Continue   taking this medication, and follow the directions you see here.     CONTINUE taking these medications     acetaminophen 325 mg tablet; Commonly known as: Tylenol   aspirin 81 mg chewable tablet; Chew 1 tablet (81 mg) once daily.   azaTHIOprine 50 mg tablet; Commonly known as: Imuran; Take 1 tablet (50   mg) by mouth once daily. Do not start before December 22, 2023.   calcitriol 0.25 mcg capsule; Commonly known as: Rocaltrol; Take 1   capsule (0.25 mcg) by mouth  "once daily. Do not start before December 22, 2023.   carvedilol 12.5 mg tablet; Commonly known as: Coreg; Take 3 tablets   (37.5 mg) by mouth 2 times a day.   cholecalciferol 25 MCG (1000 UT) tablet; Commonly known as: Vitamin D-3;   Take 1 tablet (1,000 Units) by mouth once daily. Do not start before   December 22, 2023.   hydrALAZINE 100 mg tablet; Commonly known as: Apresoline; Take 1 tablet   (100 mg) by mouth 3 times a day.   insulin lispro 100 unit/mL injection; Commonly known as: HumaLOG   Lantus U-100 Insulin 100 unit/mL injection; Generic drug: insulin   glargine   Lokelma 5 gram packet; Generic drug: sodium zirconium cyclosilicate;   Take 5 g by mouth once every 24 hours.   multivitamin tablet   ondansetron 4 mg tablet; Commonly known as: Zofran   pen needle, diabetic 32 gauge x 5/32\" needle; Commonly known as:   TechLITE Pen Needle; 1 each 3 times a day.   pravastatin 10 mg tablet; Commonly known as: Pravachol; Take 1 tablet   (10 mg) by mouth once daily at bedtime.   sulfamethoxazole-trimethoprim 400-80 mg tablet; Commonly known as:   Bactrim; Take 1 tablet by mouth 2 times a day.   * Unilet Super Thin Lancets 30 gauge misc; Generic drug: lancets; USE TO   TEST BLOOD SUGAR THREE TIMES A DAY   * Unilet Super Thin Lancets 30 gauge misc; Generic drug: lancets; 1 each   3 times a day.  * This list has 2 medication(s) that are the same as other medications   prescribed for you. Read the directions carefully, and ask your doctor or   other care provider to review them with you.     STOP taking these medications     amLODIPine 10 mg tablet; Commonly known as: Norvasc   clotrimazole 10 mg dariel; Commonly known as: Mycelex   FeroSuL tablet; Generic drug: ferrous sulfate (325 mg ferrous sulfate)   furosemide 40 mg tablet; Commonly known as: Lasix       Outpatient Follow-Up  Future Appointments   Date Time Provider Department Center   1/24/2024  4:00 PM Elma Hutton, APRN-CNP, DNP [id number] [facility] "   2/5/2024  9:20 AM TXP KIDNEY PROVIDER [id number] [facility]   2/7/2024  2:40 PM Cara Yung MD [id number] [facility]   2/13/2024 10:20 AM Sumeet Bacon MD [id number] [facility]   2/28/2024 10:00 AM Estella Quinonez MD [id number] [facility]   3/5/2024  3:15 PM Kurtis Jc MD [id number] [facility]   3/27/2024 10:30 AM Elías Penn, APRN-CNP [id number] [facility]   4/16/2024 10:20 AM TXP KIDNEY PROVIDER [id number] [facility]   5/13/2024  9:45 AM Daxa Cote DPM [id number] [facility]   10/16/2024 10:30 AM Vinicius Araiza MD [id number] [facility]       Patrice Anton MS4

## 2024-01-23 PROCEDURE — RXMED WILLOW AMBULATORY MEDICATION CHARGE

## 2024-01-24 ENCOUNTER — OFFICE VISIT (OUTPATIENT)
Dept: PRIMARY CARE | Facility: CLINIC | Age: 69
End: 2024-01-24
Payer: COMMERCIAL

## 2024-01-24 VITALS
HEART RATE: 76 BPM | TEMPERATURE: 97 F | DIASTOLIC BLOOD PRESSURE: 56 MMHG | WEIGHT: 181.4 LBS | HEIGHT: 68 IN | SYSTOLIC BLOOD PRESSURE: 95 MMHG | BODY MASS INDEX: 27.49 KG/M2 | OXYGEN SATURATION: 100 % | RESPIRATION RATE: 16 BRPM

## 2024-01-24 DIAGNOSIS — E87.70 HYPERVOLEMIA, UNSPECIFIED HYPERVOLEMIA TYPE: ICD-10-CM

## 2024-01-24 DIAGNOSIS — E11.9 DIABETES MELLITUS TYPE 2 WITHOUT RETINOPATHY (MULTI): ICD-10-CM

## 2024-01-24 DIAGNOSIS — I15.1 HYPERTENSION SECONDARY TO OTHER RENAL DISORDERS: Primary | ICD-10-CM

## 2024-01-24 DIAGNOSIS — Z94.0 KIDNEY REPLACED BY TRANSPLANT (HHS-HCC): ICD-10-CM

## 2024-01-24 DIAGNOSIS — E21.2 TERTIARY HYPERPARATHYROIDISM (MULTI): ICD-10-CM

## 2024-01-24 DIAGNOSIS — N28.9 RENAL FUNCTION IMPAIRMENT: ICD-10-CM

## 2024-01-24 PROCEDURE — 1111F DSCHRG MED/CURRENT MED MERGE: CPT | Performed by: NURSE PRACTITIONER

## 2024-01-24 PROCEDURE — 3051F HG A1C>EQUAL 7.0%<8.0%: CPT | Performed by: NURSE PRACTITIONER

## 2024-01-24 PROCEDURE — 3078F DIAST BP <80 MM HG: CPT | Performed by: NURSE PRACTITIONER

## 2024-01-24 PROCEDURE — 3008F BODY MASS INDEX DOCD: CPT | Performed by: NURSE PRACTITIONER

## 2024-01-24 PROCEDURE — 3074F SYST BP LT 130 MM HG: CPT | Performed by: NURSE PRACTITIONER

## 2024-01-24 PROCEDURE — 99213 OFFICE O/P EST LOW 20 MIN: CPT | Performed by: NURSE PRACTITIONER

## 2024-01-24 PROCEDURE — 1159F MED LIST DOCD IN RCRD: CPT | Performed by: NURSE PRACTITIONER

## 2024-01-24 PROCEDURE — 1036F TOBACCO NON-USER: CPT | Performed by: NURSE PRACTITIONER

## 2024-01-24 PROCEDURE — 1160F RVW MEDS BY RX/DR IN RCRD: CPT | Performed by: NURSE PRACTITIONER

## 2024-01-24 PROCEDURE — 1125F AMNT PAIN NOTED PAIN PRSNT: CPT | Performed by: NURSE PRACTITIONER

## 2024-01-24 PROCEDURE — 3066F NEPHROPATHY DOC TX: CPT | Performed by: NURSE PRACTITIONER

## 2024-01-24 ASSESSMENT — ENCOUNTER SYMPTOMS
DEPRESSION: 0
OCCASIONAL FEELINGS OF UNSTEADINESS: 0
LOSS OF SENSATION IN FEET: 0

## 2024-01-24 ASSESSMENT — PAIN SCALES - GENERAL: PAINLEVEL: 6

## 2024-01-24 NOTE — PROGRESS NOTES
"Subjective   Patient ID: Manolo Bashir is a 68 y.o. male who presents for Establish Care.    HPI   Mr Mckay presents today to establish primary care. He has ESRD. Kidney disease initially diagnosed in , with history of 2 renal transplants now once again in renal failure. He presents today with concern about feeling weak and dizzy with BP 95/55. After sitting in the exam room he is feeling better and repeat BP was 112/68. He is scheduled to see Renal transplant on 24. He cancelled tomorrow's dialysis appointment.  FH: Single, 6 children one  of COVID. Career as a . Lives with his daughter. That daughter and daughter's mother have POA for health care. Her remains a Full code Mother  of bone cancer. Father  of a heart attack  in long-term.  SH: Graduated HS. Occas Alcohol, No drugs, No Tena. Hobbies include Race cars, Basketball, Baseball, he played baseball in the past. Supportive friends. Grew up in [place], family is close and hold a yearly reunion there.     Review of Systems   Constitutional:  Positive for fatigue.   HENT: Negative.     Eyes: Negative.    Respiratory: Negative.     Cardiovascular: Negative.    Gastrointestinal: Negative.    Endocrine: Negative.    Genitourinary:  Positive for decreased urine volume and enuresis.   Musculoskeletal: Negative.    Skin: Negative.    Allergic/Immunologic: Negative.    Neurological:  Positive for dizziness, weakness and light-headedness.   Hematological: Negative.    Psychiatric/Behavioral: Negative.       Facial and all body swelling with enuresis.       Objective   BP 95/56 (BP Location: Right arm, Patient Position: Sitting, BP Cuff Size: Adult)   Pulse 76   Temp 36.1 °C (97 °F) (Temporal)   Resp 16   Ht 1.727 m (5' 8\")   Wt 82.3 kg (181 lb 6.4 oz)   SpO2 100%   BMI 27.58 kg/m²     Physical Exam  General: Well groomed. Mood flat affect.   HEENT: MMM TMs intact  Chest: CTA  Heart: RRR  Ext: no edema  MS: full ROM and full " strength upper and lower extremities.  Skin: warm, moist, intact     Assessment/Plan   Diagnoses and all orders for this visit:  Hypertension secondary to other renal disorders  Diabetes mellitus type 2 without retinopathy (CMS/HCC)  Renal function impairment  Kidney replaced by transplant  Tertiary hyperparathyroidism (CMS/HCC)  Hypervolemia, unspecified hypervolemia type

## 2024-01-25 ENCOUNTER — APPOINTMENT (OUTPATIENT)
Dept: DIALYSIS | Facility: HOSPITAL | Age: 69
End: 2024-01-25
Payer: COMMERCIAL

## 2024-01-25 ASSESSMENT — ENCOUNTER SYMPTOMS
LIGHT-HEADEDNESS: 1
DIZZINESS: 1
RESPIRATORY NEGATIVE: 1
WEAKNESS: 1
EYES NEGATIVE: 1
ALLERGIC/IMMUNOLOGIC NEGATIVE: 1
CARDIOVASCULAR NEGATIVE: 1
HEMATOLOGIC/LYMPHATIC NEGATIVE: 1
FATIGUE: 1
PSYCHIATRIC NEGATIVE: 1
GASTROINTESTINAL NEGATIVE: 1
ENDOCRINE NEGATIVE: 1
MUSCULOSKELETAL NEGATIVE: 1

## 2024-01-26 ENCOUNTER — PHARMACY VISIT (OUTPATIENT)
Dept: PHARMACY | Facility: CLINIC | Age: 69
End: 2024-01-26
Payer: MEDICAID

## 2024-02-01 ENCOUNTER — LAB (OUTPATIENT)
Dept: LAB | Facility: LAB | Age: 69
End: 2024-02-01
Payer: COMMERCIAL

## 2024-02-01 DIAGNOSIS — Z94.0 IMMUNOSUPPRESSIVE MANAGEMENT ENCOUNTER FOLLOWING KIDNEY TRANSPLANT (HHS-HCC): ICD-10-CM

## 2024-02-01 DIAGNOSIS — N05.1 FOCAL AND SEGMENTAL PROLIFERATIVE GLOMERULONEPHRITIS: ICD-10-CM

## 2024-02-01 DIAGNOSIS — Z94.0 KIDNEY REPLACED BY TRANSPLANT (HHS-HCC): ICD-10-CM

## 2024-02-01 DIAGNOSIS — Z79.899 IMMUNOSUPPRESSIVE MANAGEMENT ENCOUNTER FOLLOWING KIDNEY TRANSPLANT (HHS-HCC): ICD-10-CM

## 2024-02-01 LAB
ALBUMIN SERPL BCP-MCNC: 3.1 G/DL (ref 3.4–5)
ALP SERPL-CCNC: 61 U/L (ref 33–136)
ALT SERPL W P-5'-P-CCNC: 13 U/L (ref 10–52)
ANION GAP SERPL CALC-SCNC: 12 MMOL/L (ref 10–20)
ANION GAP SERPL CALC-SCNC: 12 MMOL/L (ref 10–20)
AST SERPL W P-5'-P-CCNC: 11 U/L (ref 9–39)
BASOPHILS # BLD AUTO: 0.01 X10*3/UL (ref 0–0.1)
BASOPHILS NFR BLD AUTO: 0.4 %
BILIRUB SERPL-MCNC: 0.3 MG/DL (ref 0–1.2)
BUN SERPL-MCNC: 77 MG/DL (ref 6–23)
BUN SERPL-MCNC: 77 MG/DL (ref 6–23)
CALCIUM SERPL-MCNC: 9.7 MG/DL (ref 8.6–10.6)
CALCIUM SERPL-MCNC: 9.7 MG/DL (ref 8.6–10.6)
CHLORIDE SERPL-SCNC: 107 MMOL/L (ref 98–107)
CHLORIDE SERPL-SCNC: 107 MMOL/L (ref 98–107)
CO2 SERPL-SCNC: 27 MMOL/L (ref 21–32)
CO2 SERPL-SCNC: 27 MMOL/L (ref 21–32)
CREAT SERPL-MCNC: 6.19 MG/DL (ref 0.5–1.3)
CREAT SERPL-MCNC: 6.19 MG/DL (ref 0.5–1.3)
CRP SERPL-MCNC: 0.57 MG/DL
EGFRCR SERPLBLD CKD-EPI 2021: 9 ML/MIN/1.73M*2
EGFRCR SERPLBLD CKD-EPI 2021: 9 ML/MIN/1.73M*2
EOSINOPHIL # BLD AUTO: 0.07 X10*3/UL (ref 0–0.7)
EOSINOPHIL NFR BLD AUTO: 2.6 %
ERYTHROCYTE [DISTWIDTH] IN BLOOD BY AUTOMATED COUNT: 13.5 % (ref 11.5–14.5)
GLUCOSE SERPL-MCNC: 290 MG/DL (ref 74–99)
GLUCOSE SERPL-MCNC: 290 MG/DL (ref 74–99)
HAV IGM SER QL: NONREACTIVE
HBV CORE IGM SER QL: NONREACTIVE
HBV SURFACE AG SERPL QL IA: NONREACTIVE
HCT VFR BLD AUTO: 28.4 % (ref 41–52)
HCV AB SER QL: REACTIVE
HGB BLD-MCNC: 9.4 G/DL (ref 13.5–17.5)
IGA SERPL-MCNC: 630 MG/DL (ref 70–400)
IGG SERPL-MCNC: 940 MG/DL (ref 700–1600)
IGM SERPL-MCNC: 20 MG/DL (ref 40–230)
IMM GRANULOCYTES # BLD AUTO: 0.02 X10*3/UL (ref 0–0.7)
IMM GRANULOCYTES NFR BLD AUTO: 0.8 % (ref 0–0.9)
LYMPHOCYTES # BLD AUTO: 0.74 X10*3/UL (ref 1.2–4.8)
LYMPHOCYTES NFR BLD AUTO: 27.8 %
MCH RBC QN AUTO: 29.4 PG (ref 26–34)
MCHC RBC AUTO-ENTMCNC: 33.1 G/DL (ref 32–36)
MCV RBC AUTO: 89 FL (ref 80–100)
MONOCYTES # BLD AUTO: 0.35 X10*3/UL (ref 0.1–1)
MONOCYTES NFR BLD AUTO: 13.2 %
NEUTROPHILS # BLD AUTO: 1.47 X10*3/UL (ref 1.2–7.7)
NEUTROPHILS NFR BLD AUTO: 55.2 %
NRBC BLD-RTO: 0 /100 WBCS (ref 0–0)
PLATELET # BLD AUTO: 99 X10*3/UL (ref 150–450)
POTASSIUM SERPL-SCNC: 5 MMOL/L (ref 3.5–5.3)
POTASSIUM SERPL-SCNC: 5 MMOL/L (ref 3.5–5.3)
PROT SERPL-MCNC: 5.5 G/DL (ref 6.4–8.2)
RBC # BLD AUTO: 3.2 X10*6/UL (ref 4.5–5.9)
SODIUM SERPL-SCNC: 141 MMOL/L (ref 136–145)
SODIUM SERPL-SCNC: 141 MMOL/L (ref 136–145)
WBC # BLD AUTO: 2.7 X10*3/UL (ref 4.4–11.3)

## 2024-02-01 PROCEDURE — 36415 COLL VENOUS BLD VENIPUNCTURE: CPT

## 2024-02-01 PROCEDURE — 87522 HEPATITIS C REVRS TRNSCRPJ: CPT

## 2024-02-01 PROCEDURE — RXMED WILLOW AMBULATORY MEDICATION CHARGE

## 2024-02-01 PROCEDURE — 86140 C-REACTIVE PROTEIN: CPT

## 2024-02-01 PROCEDURE — 86481 TB AG RESPONSE T-CELL SUSP: CPT

## 2024-02-01 PROCEDURE — 85025 COMPLETE CBC W/AUTO DIFF WBC: CPT

## 2024-02-01 PROCEDURE — 80074 ACUTE HEPATITIS PANEL: CPT

## 2024-02-01 PROCEDURE — 80197 ASSAY OF TACROLIMUS: CPT

## 2024-02-01 PROCEDURE — 82784 ASSAY IGA/IGD/IGG/IGM EACH: CPT

## 2024-02-01 PROCEDURE — 80048 BASIC METABOLIC PNL TOTAL CA: CPT

## 2024-02-01 PROCEDURE — 80053 COMPREHEN METABOLIC PANEL: CPT

## 2024-02-02 ENCOUNTER — PHARMACY VISIT (OUTPATIENT)
Dept: PHARMACY | Facility: CLINIC | Age: 69
End: 2024-02-02
Payer: MEDICAID

## 2024-02-02 ENCOUNTER — TELEPHONE (OUTPATIENT)
Dept: TRANSPLANT | Facility: HOSPITAL | Age: 69
End: 2024-02-02
Payer: COMMERCIAL

## 2024-02-02 DIAGNOSIS — Z94.0 KIDNEY REPLACED BY TRANSPLANT (HHS-HCC): ICD-10-CM

## 2024-02-02 DIAGNOSIS — E87.5 HYPERKALEMIA: ICD-10-CM

## 2024-02-02 LAB
HCV RNA SERPL NAA+PROBE-ACNC: NOT DETECTED K[IU]/ML
HCV RNA SERPL NAA+PROBE-LOG IU: NORMAL {LOG_IU}/ML
TACROLIMUS BLD-MCNC: 12.9 NG/ML

## 2024-02-02 PROCEDURE — RXMED WILLOW AMBULATORY MEDICATION CHARGE

## 2024-02-02 NOTE — TELEPHONE ENCOUNTER
Lab Review with Dr. Arroyo   -  Cr. up to 6.19 from 4.28  BUN 77, prev 55  GFR 9  Per patient has a cold, not hydrating well, denies swelling, sob, urinating but not hydrating well. No N/V/D/itching  +HCV AB / HCV RNA PCR (-)  -  Reviewed with Dr. Arroyo, if having swelling or SOB have patient go to ER, patient currently denies, gave ER precautions  -  Repeat labs early next week   Lokelma 5 gms , and low K diet    -  Called patient went over POC, patient verb understanding

## 2024-02-03 ENCOUNTER — TELEPHONE (OUTPATIENT)
Dept: TRANSPLANT | Facility: HOSPITAL | Age: 69
End: 2024-02-03
Payer: COMMERCIAL

## 2024-02-03 LAB
NIL(NEG) CONTROL SPOT COUNT: NORMAL
PANEL A SPOT COUNT: 3
PANEL B SPOT COUNT: 2
POS CONTROL SPOT COUNT: NORMAL
T-SPOT. TB INTERPRETATION: NEGATIVE

## 2024-02-03 NOTE — TELEPHONE ENCOUNTER
ON CALL: Dr Arroyo reached out reporting pt's Lokelma requires a PA. $0 free trial card info sent to [facility] so patient can fill ASAP. Dr Arroyo updated.

## 2024-02-03 NOTE — TELEPHONE ENCOUNTER
Update from [place] - pt has used $0 free trial card in the past. Ab also requires a PA. Dr Arroyo updated - will have primary coordinator follow up with PA status on Monday.

## 2024-02-05 ENCOUNTER — TELEPHONE (OUTPATIENT)
Dept: SURGERY | Facility: HOSPITAL | Age: 69
End: 2024-02-05

## 2024-02-05 ENCOUNTER — OFFICE VISIT (OUTPATIENT)
Dept: TRANSPLANT | Facility: HOSPITAL | Age: 69
End: 2024-02-05
Payer: COMMERCIAL

## 2024-02-05 ENCOUNTER — LAB (OUTPATIENT)
Dept: LAB | Facility: LAB | Age: 69
End: 2024-02-05
Payer: COMMERCIAL

## 2024-02-05 VITALS
DIASTOLIC BLOOD PRESSURE: 67 MMHG | WEIGHT: 182 LBS | TEMPERATURE: 98.4 F | BODY MASS INDEX: 27.67 KG/M2 | SYSTOLIC BLOOD PRESSURE: 133 MMHG | HEART RATE: 78 BPM | OXYGEN SATURATION: 100 %

## 2024-02-05 DIAGNOSIS — D61.818 PANCYTOPENIA (MULTI): ICD-10-CM

## 2024-02-05 DIAGNOSIS — Z94.0 KIDNEY REPLACED BY TRANSPLANT (HHS-HCC): ICD-10-CM

## 2024-02-05 DIAGNOSIS — E87.5 HYPERKALEMIA: Primary | ICD-10-CM

## 2024-02-05 DIAGNOSIS — N05.8 IMMUNE-COMPLEX GLOMERULONEPHRITIS: ICD-10-CM

## 2024-02-05 DIAGNOSIS — D47.2 MGUS (MONOCLONAL GAMMOPATHY OF UNKNOWN SIGNIFICANCE): ICD-10-CM

## 2024-02-05 DIAGNOSIS — Z51.81 THERAPEUTIC DRUG MONITORING: ICD-10-CM

## 2024-02-05 LAB
ALBUMIN SERPL BCP-MCNC: 3 G/DL (ref 3.4–5)
ANION GAP SERPL CALC-SCNC: 15 MMOL/L (ref 10–20)
BASOPHILS # BLD AUTO: 0.02 X10*3/UL (ref 0–0.1)
BASOPHILS NFR BLD AUTO: 0.8 %
BUN SERPL-MCNC: 72 MG/DL (ref 6–23)
CALCIUM SERPL-MCNC: 9.8 MG/DL (ref 8.6–10.6)
CHLORIDE SERPL-SCNC: 109 MMOL/L (ref 98–107)
CO2 SERPL-SCNC: 25 MMOL/L (ref 21–32)
CREAT SERPL-MCNC: 5.74 MG/DL (ref 0.5–1.3)
EGFRCR SERPLBLD CKD-EPI 2021: 10 ML/MIN/1.73M*2
EOSINOPHIL # BLD AUTO: 0.09 X10*3/UL (ref 0–0.7)
EOSINOPHIL NFR BLD AUTO: 3.7 %
ERYTHROCYTE [DISTWIDTH] IN BLOOD BY AUTOMATED COUNT: 13.9 % (ref 11.5–14.5)
GLUCOSE SERPL-MCNC: 101 MG/DL (ref 74–99)
HCT VFR BLD AUTO: 28.1 % (ref 41–52)
HGB BLD-MCNC: 9 G/DL (ref 13.5–17.5)
IMM GRANULOCYTES # BLD AUTO: 0.02 X10*3/UL (ref 0–0.7)
IMM GRANULOCYTES NFR BLD AUTO: 0.8 % (ref 0–0.9)
LYMPHOCYTES # BLD AUTO: 0.64 X10*3/UL (ref 1.2–4.8)
LYMPHOCYTES NFR BLD AUTO: 26.3 %
MCH RBC QN AUTO: 28.7 PG (ref 26–34)
MCHC RBC AUTO-ENTMCNC: 32 G/DL (ref 32–36)
MCV RBC AUTO: 90 FL (ref 80–100)
MONOCYTES # BLD AUTO: 0.4 X10*3/UL (ref 0.1–1)
MONOCYTES NFR BLD AUTO: 16.5 %
NEUTROPHILS # BLD AUTO: 1.26 X10*3/UL (ref 1.2–7.7)
NEUTROPHILS NFR BLD AUTO: 51.9 %
NRBC BLD-RTO: 0 /100 WBCS (ref 0–0)
PHOSPHATE SERPL-MCNC: 4 MG/DL (ref 2.5–4.9)
PLATELET # BLD AUTO: 113 X10*3/UL (ref 150–450)
POTASSIUM SERPL-SCNC: 4.9 MMOL/L (ref 3.5–5.3)
RBC # BLD AUTO: 3.14 X10*6/UL (ref 4.5–5.9)
SODIUM SERPL-SCNC: 144 MMOL/L (ref 136–145)
TACROLIMUS BLD-MCNC: 10.5 NG/ML
WBC # BLD AUTO: 2.4 X10*3/UL (ref 4.4–11.3)

## 2024-02-05 PROCEDURE — 1036F TOBACCO NON-USER: CPT | Performed by: HOSPITALIST

## 2024-02-05 PROCEDURE — 1126F AMNT PAIN NOTED NONE PRSNT: CPT | Performed by: HOSPITALIST

## 2024-02-05 PROCEDURE — 87799 DETECT AGENT NOS DNA QUANT: CPT

## 2024-02-05 PROCEDURE — 3078F DIAST BP <80 MM HG: CPT | Performed by: HOSPITALIST

## 2024-02-05 PROCEDURE — 3051F HG A1C>EQUAL 7.0%<8.0%: CPT | Performed by: HOSPITALIST

## 2024-02-05 PROCEDURE — 1160F RVW MEDS BY RX/DR IN RCRD: CPT | Performed by: HOSPITALIST

## 2024-02-05 PROCEDURE — 36415 COLL VENOUS BLD VENIPUNCTURE: CPT

## 2024-02-05 PROCEDURE — 85025 COMPLETE CBC W/AUTO DIFF WBC: CPT

## 2024-02-05 PROCEDURE — 99214 OFFICE O/P EST MOD 30 MIN: CPT

## 2024-02-05 PROCEDURE — 1159F MED LIST DOCD IN RCRD: CPT | Performed by: HOSPITALIST

## 2024-02-05 PROCEDURE — 1111F DSCHRG MED/CURRENT MED MERGE: CPT | Performed by: HOSPITALIST

## 2024-02-05 PROCEDURE — 80069 RENAL FUNCTION PANEL: CPT

## 2024-02-05 PROCEDURE — 3075F SYST BP GE 130 - 139MM HG: CPT | Performed by: HOSPITALIST

## 2024-02-05 PROCEDURE — 80197 ASSAY OF TACROLIMUS: CPT

## 2024-02-05 PROCEDURE — 3008F BODY MASS INDEX DOCD: CPT | Performed by: HOSPITALIST

## 2024-02-05 RX ORDER — GABAPENTIN 300 MG/1
CAPSULE ORAL
COMMUNITY
Start: 2023-10-16 | End: 2024-02-20

## 2024-02-05 RX ORDER — CINACALCET 30 MG/1
30 TABLET, FILM COATED ORAL DAILY
COMMUNITY
Start: 2019-07-22 | End: 2024-02-05 | Stop reason: SDUPTHER

## 2024-02-05 RX ORDER — DOCUSATE SODIUM 100 MG/1
1 CAPSULE, LIQUID FILLED ORAL DAILY PRN
Status: ON HOLD | COMMUNITY
Start: 2014-06-06 | End: 2024-05-12 | Stop reason: ENTERED-IN-ERROR

## 2024-02-05 ASSESSMENT — ENCOUNTER SYMPTOMS
SHORTNESS OF BREATH: 0
NERVOUS/ANXIOUS: 0
COUGH: 0
FREQUENCY: 0
HEMATURIA: 0
ABDOMINAL DISTENTION: 0
DIARRHEA: 0
CHEST TIGHTNESS: 0
VOMITING: 0
PALPITATIONS: 0
HEADACHES: 0
CONFUSION: 0
BACK PAIN: 0
NAUSEA: 0

## 2024-02-05 ASSESSMENT — PAIN SCALES - GENERAL: PAINLEVEL: 0-NO PAIN

## 2024-02-05 NOTE — PATIENT INSTRUCTIONS
Please get labs done today.   Right upper extremity graft ultrasound.  Continue weekly labs.  Vascular Surgery referral.  Decrease tacrolimus to 1.5 mg twice daily.   Follow up in clinic in 2 weeks.

## 2024-02-05 NOTE — PROGRESS NOTES
Manolo Bashir is a 68 y.o.M with PMH ESRD (on HD [id number]), s/p 2x renal transplants (1992, 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    -Patient completed 2 doses of rituximab total of 2 g x 1 5/20/2024.  -Recent hospital admission as of 1/16/2024 with hypertensive urgency with fluid overload.    Interim history: Denied any change in the urine output having leg swellings.  There is an uptrend in the creatinine noticed along with the elevated tacrolimus levels.  Discussed with him about need for starting dialysis sooner than later.  Also discussed the plan with  Amalia.    Review of Systems   Respiratory:  Negative for cough, chest tightness and shortness of breath.    Cardiovascular:  Negative for chest pain, palpitations and leg swelling.   Gastrointestinal:  Negative for abdominal distention, diarrhea, nausea and vomiting.   Genitourinary:  Negative for frequency, hematuria and urgency.   Musculoskeletal:  Negative for back pain.   Neurological:  Negative for headaches.   Psychiatric/Behavioral:  Negative for confusion. The patient is not nervous/anxious.         Objective:  Visit Vitals  /67   Pulse 78   Temp 36.9 °C (98.4 °F) (Temporal)   Wt 82.6 kg (182 lb)   SpO2 100%   BMI 27.67 kg/m²   Smoking Status Never   BSA 1.99 m²      Physical Exam  HENT:      Head: Normocephalic and atraumatic.      Nose: Nose normal.      Mouth/Throat:      Mouth: Mucous membranes are moist.   Eyes:      Extraocular Movements:  Extraocular movements intact.      Pupils: Pupils are equal, round, and reactive to light.   Cardiovascular:      Rate and Rhythm: Normal rate.      Pulses: Normal pulses.      Heart sounds: Normal heart sounds.   Pulmonary:      Effort: Pulmonary effort is normal.   Abdominal:      Palpations: Abdomen is soft.      Tenderness: There is no abdominal tenderness. There is no guarding.   Musculoskeletal:         General: Swelling present. Normal range of motion.      Cervical back: Normal range of motion.   Skin:     General: Skin is warm.   Neurological:      General: No focal deficit present.      Mental Status: Mental status is at baseline.   Psychiatric:         Mood and Affect: Mood normal.            Current Outpatient Medications:     acetaminophen (Tylenol) 325 mg tablet, Take 3 tablets (975 mg) by mouth every 6 hours if needed (pain)., Disp: , Rfl:     aspirin 81 mg chewable tablet, Chew 1 tablet (81 mg) once daily., Disp: 30 tablet, Rfl: 11    azaTHIOprine (Imuran) 50 mg tablet, Take 1 tablet (50 mg) by mouth once daily. Do not start before December 22, 2023., Disp: 30 tablet, Rfl: 2    calcitriol (Rocaltrol) 0.25 mcg capsule, Take 1 capsule (0.25 mcg) by mouth once daily. Do not start before December 22, 2023., Disp: 30 capsule, Rfl: 2    carvedilol (Coreg) 12.5 mg tablet, Take 3 tablets (37.5 mg) by mouth 2 times a day., Disp: 180 tablet, Rfl: 1    cholecalciferol (Vitamin D-3) 25 MCG (1000 UT) tablet, Take 1 tablet (1,000 Units) by mouth once daily. Do not start before December 22, 2023., Disp: 30 tablet, Rfl: 2    diclofenac sodium (Voltaren) 1 % gel gel, Apply 1 Application (4 grams)  topically 4 times a day as needed (back pain)., Disp: 100 g, Rfl: 0    cyclobenzaprine (Flexeril) 10 mg tablet, Take 1 tablet (10 mg) by mouth 3 times a day. (Patient not taking: Reported on 2/5/2024), Disp: 90 tablet, Rfl: 0    hydrALAZINE (Apresoline) 100 mg tablet, Take 1 tablet (100 mg) by mouth 3 times a day., Disp: 90  "tablet, Rfl: 2    insulin glargine (Lantus U-100 Insulin) 100 unit/mL injection, Inject 24 Units under the skin once every 24 hours. Take as directed per insulin instructions., Disp: , Rfl:     insulin lispro (HumaLOG) 100 unit/mL injection, Inject under the skin 3 times a day with meals. 100-199 2 units 200-299 4 units 300-399 6 units, Disp: , Rfl:     isosorbide mononitrate ER (Imdur) 60 mg 24 hr tablet, Take 1 tablet (60 mg) by mouth once daily. Do not crush or chew. Do not start before January 22, 2024., Disp: 30 tablet, Rfl: 1    lancets (Unilet Super Thin Lancets) 30 gauge misc, USE TO TEST BLOOD SUGAR THREE TIMES A DAY, Disp: 300 each, Rfl: 0    lidocaine 4 % patch, Place 1 patch over 12 hours on the skin once daily. Remove & discard patch within 12 hours or as directed by MD. Do not start before January 22, 2024., Disp: 5 patch, Rfl: 0    multivitamin tablet, Take 1 tablet by mouth once daily., Disp: , Rfl:     NIFEdipine ER (Adalat CC) 60 mg 24 hr tablet, Take 1 tablet (60 mg) by mouth 2 times a day. Do not crush, chew, or split., Disp: 60 tablet, Rfl: 1    ondansetron (Zofran) 4 mg tablet, Take 1 tablet (4 mg) by mouth every 8 hours if needed for nausea or vomiting., Disp: , Rfl:     pantoprazole (ProtoNix) 40 mg EC tablet, Take 1 tablet (40 mg) by mouth once daily in the morning. Take before meals. Do not crush, chew, or split. Do not start before January 22, 2024., Disp: 30 tablet, Rfl: 1    pen needle, diabetic (TechLITE Pen Needle) 32 gauge x 5/32\" needle, 1 each 3 times a day., Disp: 100 each, Rfl: 11    pravastatin (Pravachol) 10 mg tablet, Take 1 tablet (10 mg) by mouth once daily at bedtime., Disp: 90 tablet, Rfl: 0    predniSONE (Deltasone) 5 mg tablet, Take 1 tablet (5 mg) by mouth once daily in the morning. Do not start before January 22, 2024., Disp: 21 tablet, Rfl: 1    sodium zirconium cyclosilicate (Lokelma) 5 gram packet, Take 5 g by mouth once daily., Disp: 30 packet, Rfl: 11    " sulfamethoxazole-trimethoprim (Bactrim) 400-80 mg tablet, Take 1 tablet by mouth 2 times a day., Disp: 60 tablet, Rfl: 5    tacrolimus 0.5 mg capsule, Take 2.5 mg by mouth every 12 hours., Disp: 1 capsule, Rfl: 0    torsemide (Demadex) 20 mg tablet, Take 2 tablets (40 mg) by mouth 2 times a day with meals., Disp: 120 tablet, Rfl: 1    Unilet Super Thin Lancets 30 gauge misc, 1 each 3 times a day., Disp: 100 each, Rfl: 3     [unfilled]     No images are attached to the encounter.     Assessment and Plan : Manolo Bashir is a 68 y.o.M with PMH ESRD (on HD [id number]), s/p 2x renal transplants (1992, 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    -Patient completed 2 doses of rituximab total of 2 g x 1 5/20/2024.  -Recent hospital admission as of 1/16/2024 with hypertensive urgency with fluid overload.    Interim history: Denied any change in the urine output having leg swellings.  There is an uptrend in the creatinine noticed along with the elevated tacrolimus levels.  Discussed with him about need for starting dialysis sooner than later.  Also discussed the plan with his wife Amalia.    Allograft function: Impaired creatinine with mild hyperkalemia, continue with Lokelma.  Will repeat labs today patient does not have any uremic signs or symptoms at this time.  Last UPC is of 5.22 as of December 2023.  -Discussed with him in case if he becomes volume overloaded or short of breath or confused come to the ER immediately  to initiate dialysis.  -Will follow-up with repeat labs and in case labs are worsened we will also do admission to initiate dialysis.  -Discussed with him about getting evaluated by the vascular surgeon about his fistula in the right upper extremity.    Immunosuppression: Recent tacrolimus levels are 12.9 aim levels are 4-6 decrease tacrolimus to 1.5 mg twice daily, continue with azathioprine 50 mg daily, prednisone 5 mg daily.    Hemodynamics: Blood pressures are optimally controlled continue with the torsemide 40 twice daily, carvedilol 12.5 twice daily, hydralazine 100 3 times daily, Imdur 60 mg daily, nifedipine 60 mg 2 times a day.    Infectious prophylaxis: On leukopenia noticed recent BK, EBV, CMV check as of 1/17/2024 but negative.  Will continue with prophylaxis with the Bactrim for 3 months post rituximab infusion.    Anemia/leukopenia: Leukopenia noticed will check CMV and EBV.  Mild anemia likely due to anemia due to chronic disease might benefit from DARIANA.    Bone mineral disease: Continue with calcitriol and vitamin D, recent vitamin D levels are 38 and PTH is 159.    General health care: Recommended vascular surgery follow-up, dermatology follow-up.    Labs weekly once and follow-up in 2 weeks      Dina Benoit MD

## 2024-02-06 LAB
BKV DNA SERPL NAA+PROBE-LOG#: NORMAL {LOG_COPIES}/ML
CMV DNA SERPL NAA+PROBE-LOG IU: ABNORMAL {LOG_IU}/ML
LABORATORY COMMENT REPORT: ABNORMAL
LABORATORY COMMENT REPORT: NOT DETECTED

## 2024-02-06 PROCEDURE — RXMED WILLOW AMBULATORY MEDICATION CHARGE

## 2024-02-07 ENCOUNTER — OFFICE VISIT (OUTPATIENT)
Dept: ORTHOPEDIC SURGERY | Facility: CLINIC | Age: 69
End: 2024-02-07
Payer: COMMERCIAL

## 2024-02-07 DIAGNOSIS — Z94.0 KIDNEY TRANSPLANTED (HHS-HCC): ICD-10-CM

## 2024-02-07 DIAGNOSIS — M47.816 LUMBAR SPONDYLOSIS: Primary | ICD-10-CM

## 2024-02-07 PROCEDURE — 1036F TOBACCO NON-USER: CPT | Performed by: PHYSICAL MEDICINE & REHABILITATION

## 2024-02-07 PROCEDURE — 1111F DSCHRG MED/CURRENT MED MERGE: CPT | Performed by: PHYSICAL MEDICINE & REHABILITATION

## 2024-02-07 PROCEDURE — 1159F MED LIST DOCD IN RCRD: CPT | Performed by: PHYSICAL MEDICINE & REHABILITATION

## 2024-02-07 PROCEDURE — 1126F AMNT PAIN NOTED NONE PRSNT: CPT | Performed by: PHYSICAL MEDICINE & REHABILITATION

## 2024-02-07 PROCEDURE — 1160F RVW MEDS BY RX/DR IN RCRD: CPT | Performed by: PHYSICAL MEDICINE & REHABILITATION

## 2024-02-07 PROCEDURE — 3008F BODY MASS INDEX DOCD: CPT | Performed by: PHYSICAL MEDICINE & REHABILITATION

## 2024-02-07 PROCEDURE — 99203 OFFICE O/P NEW LOW 30 MIN: CPT | Performed by: PHYSICAL MEDICINE & REHABILITATION

## 2024-02-07 PROCEDURE — 3051F HG A1C>EQUAL 7.0%<8.0%: CPT | Performed by: PHYSICAL MEDICINE & REHABILITATION

## 2024-02-07 PROCEDURE — 99213 OFFICE O/P EST LOW 20 MIN: CPT | Performed by: PHYSICAL MEDICINE & REHABILITATION

## 2024-02-07 PROCEDURE — RXMED WILLOW AMBULATORY MEDICATION CHARGE

## 2024-02-07 RX ORDER — TACROLIMUS 0.5 MG/1
1.5 CAPSULE, GELATIN COATED ORAL
Qty: 90 CAPSULE | Refills: 11 | Status: SHIPPED | OUTPATIENT
Start: 2024-02-07 | End: 2024-03-16 | Stop reason: HOSPADM

## 2024-02-07 RX ORDER — CINACALCET 30 MG/1
30 TABLET, FILM COATED ORAL DAILY
Qty: 30 TABLET | Refills: 11 | Status: SHIPPED | OUTPATIENT
Start: 2024-02-07 | End: 2028-10-07

## 2024-02-07 SDOH — SOCIAL STABILITY: SOCIAL NETWORK: SOCIAL ACTIVITY:: 8

## 2024-02-07 NOTE — PROGRESS NOTES
New Consult/New Patient Note    2/7/2024     Assessment: Pleasant 68-year-old male with chronic lower back and flank pain.  History of renal transplant.  States he follows with his nephrology/transplant team regularly.  Denies any significant radicular pain or symptoms.  -Leg pain-concern for possible renal origin-he will follow-up with his nephrology team as soon as possible.  Of note he did have episode of emesis today during his visit.  If he is unable to keep fluids or other foods down or has any change in mental status he and his caregiver understand to seek urgent medical care at the emergency department.  -Mild lumbar spondylosis with facet arthropathy    PLAN:  1)  Imaging/Diagnostic Studies: Reviewed most recent lumbar x-rays and MRI-MRI of poor quality.  No severe central canal or foraminal narrowing appreciated.  Mild facet arthropathy from L4-S1 bilaterally.  2)  Therapy/Rehabilitation: New consult provided for physical therapy  3)  Pharmacological Management: Agree with Tylenol and Lidoderm patches as needed.  Limited due to history of renal transplant  4)  Spine/Surgical Interventions: None at this time  5)  Alternative Treatments: May consider alternative treatment options in the future including manipulation (chiropractor versus osteopathic) and/or acupuncture if patient does not obtain optimal relief with initial treatment plan.  6)  Consultations: Physical therapy  7)  Follow -up: 4-6 weeks or PRN if symptoms worsen/do not improve.   8)  Future treatment considerations: Pending relief with above.  Can consider lumbar medial branch blocks    Patient advised of the difference between hurt and harm and advised to continue with all normal activities and exercises. Patient verbalized understanding of the above plan and was happy with the care provided.      The above clinical summary has been dictated with voice recognition software. It has not been proofread for grammatical errors, typographical  mistakes, or other semantic inconsistencies.    Thank you for visiting our office today. It was our pleasure to take part in your healthcare.     Do not hesitate to call with any questions regarding your plan of care after leaving at [phone]    To clinicians, thank you very much for this kind referral. It is a privilege to partner with you in the care of your patients. My office would be delighted to assist you with any further consultations or with questions regarding the plan of care outlined. Do not hesitate to call the office or contact me directly.     Sincerely,    ESTIVEN Yung MD  , Physical Medicine and Rehabilitation, Orthopedic Spine  [facility] [employer] [facility]  [facility] [place]         Manolo Bashir   is a 68 y.o. male who presents with about 6 months of lower back pain.  Hx of kidney transplant   Location:  Mid lower back, right worse than left.  At or below the pantline   Radiation:  No sig. Radicular pain or symptoms.   Quality: pressure   current 10/10,  at its worst 10/10  Exacerbated by laying flat on his back  Relieved by oxycontin   Onset, traumatic event: denies any recent   Has tried:  Had his gallbladder removed, Lidocaine patch, Tylenol, Flexeril.     Valsalva sign is neg   Grocery cart sign is pos    Does intermittently wake them at night    Patient denies bowel/bladder incontinence, denies fever, denies unintentional weight loss, denies clumsiness of hands, feet, or dropping things.  Denies any constitutional or myelopathic symptomatology.      PREVIOUS TREATMENTS  IN THE LAST SIX MONTHS     Active conservative therapy  in the last six months (see below)              1. Physical therapy:  no                                                                                   2. Home exercise program after PT:                                                      3. A physician supervised home exercise program (HEP):                  4. Chiropractic Care: no                                                                   Passive conservative therapy  in the last six months (see below)              1. NSAIDS:   avoiding                                                                                                        2. Prescription pain medication:                                                              3. Acupuncture:                                                                                             4. Tens unit:      Assistive Devices: none    Work status: retired      ROS: Other than listed in HPI, PMHX below, and intake paperwork including a 30 point patient-recorded review of symptoms which was personally reviewed and inclusive of no history of unintentional weight loss, change in appetite, significant malaise, fevers, chills, or change in bowel/bladder, shortness of breath, or chest pain.    I have confirmed and edited as necessary Past Medical, Past Surgical, Family, Social History and ROS as obtained by others. These were also obtained on new patient forms.      PHYSICAL EXAM:   GENERAL APPEARANCE:  Well nourished, well developed, and no apparent distress.  NEURO PSYCH: Patient oriented to person, place, Mood pleasant. Benign affect.  MUSCULOSKELETAL and NEUROLOGICAL       VISUAL INSPECTION           LUMBAR: WNL  SPINE ROM:   LUMBAR ROM: Full      PALPATION:           SPINOUS PROCESS: Nontender midline lumbar           PARASPINALS: No significant tenderness bilateral lower lumbar  FACET LOADING: Minimally positive bilateral lower lumbar  MUSCLE BULK: Normal and symmetrical in the upper & lower extremities.  MUSCLE TONE: Normal  MOTOR: Seems functionally full in the bilateral lower extremities  GAIT: Normal.  Able to go up and heels and toes with no sig. weakness.  No sig. balance deficit appreciated    DATA REVIEW:   The below imaging studies were personally reviewed and discussed with the  patient.    Medical Decision Making:  The above note constitutes a Moderate to High level of medical decision making based on past data and imaging review, new and chronic symptoms with exacerbation, change in weakness or sensation, new imaging and diagnostic studies ordered, discussion of potential interventional or surgical treatment options, acute or chronic pain that may pose a threat to bodily function.    Past Medical History:   Diagnosis Date    Chronic kidney disease, stage 3 unspecified (CMS/HCC) 09/26/2018    Stage 3 chronic kidney disease    COVID-19 06/18/2020    COVID-19 virus infection    Diabetes (CMS/HCC)     Focal and segmental proliferative glomerulonephritis 12/23/2023    HTN (hypertension)     Other long term (current) drug therapy 07/20/2021    High risk medication use    Personal history of other diseases of the circulatory system     Personal history of cardiac murmur    Personal history of other infectious and parasitic diseases 08/17/2015    History of hepatitis    Polyp, colonic 08/17/2023    Primary osteoarthritis of both ankles 08/17/2023    Tubular adenoma of colon 08/17/2023    Unspecified kidney failure 08/17/2016    Renal failure       Medication Documentation Review Audit       Reviewed by Raquel Cordova RN (Registered Nurse) on 02/07/24 at 1432      Medication Order Taking? Sig Documenting Provider Last Dose Status   acetaminophen (Tylenol) 325 mg tablet [id number]  Take 3 tablets (975 mg) by mouth every 6 hours if needed (pain). Historical Provider, MD  Active   aspirin 81 mg chewable tablet [id number]  Chew 1 tablet (81 mg) once daily. Sumeet Bacon MD  Active   azaTHIOprine (Imuran) 50 mg tablet [id number]  Take 1 tablet (50 mg) by mouth once daily. Do not start before December 22, 2023. Chavez Goins MD  Active   calcitriol (Rocaltrol) 0.25 mcg capsule [id number]  Take 1 capsule (0.25 mcg) by mouth once daily. Do not start before December 22, 2023. Chavez Goins MD  Active   carvedilol  (Coreg) 12.5 mg tablet [id number]  Take 3 tablets (37.5 mg) by mouth 2 times a day. Sunitha Collado MD  Active   cholecalciferol (Vitamin D-3) 25 MCG (1000 UT) tablet [id number]  Take 1 tablet (1,000 Units) by mouth once daily. Do not start before December 22, 2023. Chavez Goins MD  Active   cinacalcet (Sensipar) 30 mg tablet [id number]  Take 1 tablet (30 mg) by mouth once daily. Kaycee Arroyo MD  Active    Patient not taking:   Discontinued 02/07/24 1431   diclofenac sodium (Voltaren) 1 % gel gel [id number]  Apply 1 Application (4 grams)  topically 4 times a day as needed (back pain). Sunitha Collado MD  Active   docusate sodium (Colace) 100 mg capsule [id number]  Take by mouth once daily as needed. Historical Provider, MD  Active   gabapentin (Neurontin) 300 mg capsule [id number]  Take by mouth. Historical Provider, MD  Active   hydrALAZINE (Apresoline) 100 mg tablet [id number]  Take 1 tablet (100 mg) by mouth 3 times a day. Chavez Goins MD  Active   insulin glargine (Lantus U-100 Insulin) 100 unit/mL injection [id number]  Inject 24 Units under the skin once every 24 hours. Take as directed per insulin instructions. Historical Provider, MD  Active   insulin lispro (HumaLOG) 100 unit/mL injection [id number]  Inject under the skin 3 times a day with meals. 100-199 2 units 200-299 4 units 300-399 6 units Historical Provider, MD  Active   isosorbide mononitrate ER (Imdur) 60 mg 24 hr tablet [id number]  Take 1 tablet (60 mg) by mouth once daily. Do not crush or chew. Do not start before January 22, 2024. Sunitha Collado MD  Active   lancets (Unilet Super Thin Lancets) 30 gauge misc [id number]  USE TO TEST BLOOD SUGAR THREE TIMES A DAY Estella Quinonez MD  Active   lidocaine 4 % patch [id number]  Place 1 patch over 12 hours on the skin once daily. Remove & discard patch within 12 hours or as directed by MD. Do not start before January 22, 2024. Sunitha Collado MD  Active   multivitamin tablet [id number]  Take 1  "tablet by mouth once daily. Historical Provider, MD  Active   NIFEdipine ER (Adalat CC) 60 mg 24 hr tablet [id number]  Take 1 tablet (60 mg) by mouth 2 times a day. Do not crush, chew, or split. Sunitha Collado MD  Active   ondansetron (Zofran) 4 mg tablet [id number]  Take 1 tablet (4 mg) by mouth every 8 hours if needed for nausea or vomiting. Historical Provider, MD  Active   pantoprazole (ProtoNix) 40 mg EC tablet [id number]  Take 1 tablet (40 mg) by mouth once daily in the morning. Take before meals. Do not crush, chew, or split. Do not start before January 22, 2024. Sunitha Collado MD  Active   pen needle, diabetic (TechLITE Pen Needle) 32 gauge x 5/32\" needle [id number]  1 each 3 times a day. Estella Quinonez MD  Active   pravastatin (Pravachol) 10 mg tablet [id number]  Take 1 tablet (10 mg) by mouth once daily at bedtime. Edward Herman MD  Active   predniSONE (Deltasone) 5 mg tablet [id number]  Take 1 tablet (5 mg) by mouth once daily in the morning. Do not start before January 22, 2024.   Patient taking differently: Take 1 tablet (5 mg) by mouth once daily.    Sunitha Collado MD  Active   sodium zirconium cyclosilicate (Lokelma) 5 gram packet [id number]  Take 5 g by mouth once daily. Kaycee Arroyo MD  Active   sulfamethoxazole-trimethoprim (Bactrim) 400-80 mg tablet [id number]  Take 1 tablet by mouth 2 times a day. Chavez Goins MD  Active   tacrolimus (Prograf) 0.5 mg capsule [id number]  Take 1.5 mg by mouth every 12 hours. Kaycee Arroyo MD  Active   torsemide (Demadex) 20 mg tablet [id number]  Take 2 tablets (40 mg) by mouth 2 times a day with meals. Sunitha Collado MD  Active   Unilet Super Thin Lancets 30 gauge misc [id number]  1 each 3 times a day. Estella Quinonez MD  Active                    No Known Allergies    Social History     Socioeconomic History    Marital status: Single     Spouse name: Not on file    Number of children: Not on file    Years of education: Not on file    " Highest education level: Not on file   Occupational History    Not on file   Tobacco Use    Smoking status: Never    Smokeless tobacco: Never   Vaping Use    Vaping Use: Never used   Substance and Sexual Activity    Alcohol use: Yes    Drug use: Yes     Types: Oxycodone    Sexual activity: Not on file   Other Topics Concern    Not on file   Social History Narrative    Not on file     Social Determinants of Health     Financial Resource Strain: Patient Declined (1/18/2024)    Overall Financial Resource Strain (CARDIA)     Difficulty of Paying Living Expenses: Patient declined   Food Insecurity: No Food Insecurity (10/3/2023)    Hunger Vital Sign     Worried About Running Out of Food in the Last Year: Never true     Ran Out of Food in the Last Year: Never true   Transportation Needs: Patient Declined (1/18/2024)    PRAPARE - Transportation     Lack of Transportation (Medical): Patient declined     Lack of Transportation (Non-Medical): Patient declined   Physical Activity: Unknown (10/3/2023)    Exercise Vital Sign     Days of Exercise per Week: 2 days     Minutes of Exercise per Session: Patient declined   Recent Concern: Physical Activity - Insufficiently Active (10/3/2023)    Exercise Vital Sign     Days of Exercise per Week: 2 days     Minutes of Exercise per Session: 30 min   Stress: No Stress Concern Present (10/3/2023)    [employer] [place] [employer] - Occupational Stress Questionnaire     Feeling of Stress : Not at all   Social Connections: Unknown (10/3/2023)    Social Connection and Isolation Panel [NHANES]     Frequency of Communication with Friends and Family: More than three times a week     Frequency of Social Gatherings with Friends and Family: Twice a week     Attends Jainism Services: Patient declined     Active Member of Clubs or Organizations: Patient declined     Attends Club or Organization Meetings: Patient declined     Marital Status: Never    Intimate Partner Violence: Not  At Risk (10/3/2023)    Humiliation, Afraid, Rape, and Kick questionnaire     Fear of Current or Ex-Partner: No     Emotionally Abused: No     Physically Abused: No     Sexually Abused: No   Housing Stability: Patient Declined (1/18/2024)    Housing Stability Vital Sign     Unable to Pay for Housing in the Last Year: Patient declined     Number of Places Lived in the Last Year: 1     Unstable Housing in the Last Year: Patient declined       Past Surgical History:   Procedure Laterality Date    ILEOSTOMY  04/25/2017    Ileostomy    ILEOSTOMY CLOSURE  08/17/2015    Ileostomy Closure    OTHER SURGICAL HISTORY  04/21/2017    Right Hemicolectomy    OTHER SURGICAL HISTORY  08/17/2015    Arteriovenous Surgery Creation Of A-V Fistula    OTHER SURGICAL HISTORY  08/17/2015    Sigmoidoscopy (Fiberoptic, Therapeutic )    PROSTATECTOMY  10/11/2013    Prostatectomy Radical    TRANSPLANT, KIDNEY, OPEN  1992    TRANSPLANT, KIDNEY, OPEN  2013    US GUIDED PERCUTANEOUS BIOPSY RENAL LEFT Left 11/20/2023    US GUIDED PERCUTANEOUS BIOPSY RENAL LEFT 11/20/2023 Lou Rodgers MD [facility]    US GUIDED PERCUTANEOUS PERITONEAL OR RETROPERITONEAL FLUID COLLECTION DRAINAGE  10/20/2022    US GUIDED PERCUTANEOUS PERITONEAL OR RETROPERITONEAL FLUID COLLECTION DRAINAGE 10/20/2022 [facility] CLINICAL LEGACY

## 2024-02-09 ENCOUNTER — PHARMACY VISIT (OUTPATIENT)
Dept: PHARMACY | Facility: CLINIC | Age: 69
End: 2024-02-09
Payer: MEDICAID

## 2024-02-12 PROBLEM — E86.0 DEHYDRATION: Status: ACTIVE | Noted: 2023-08-24

## 2024-02-12 PROBLEM — A49.02 METHICILLIN RESISTANT STAPHYLOCOCCUS AUREUS INFECTION: Status: ACTIVE | Noted: 2023-02-18

## 2024-02-12 PROBLEM — K64.4 RESIDUAL HEMORRHOIDAL SKIN TAGS: Status: ACTIVE | Noted: 2023-08-28

## 2024-02-12 PROBLEM — L03.90 CELLULITIS: Status: ACTIVE | Noted: 2024-02-12

## 2024-02-12 PROBLEM — R61 GENERALIZED HYPERHIDROSIS: Status: ACTIVE | Noted: 2023-05-07

## 2024-02-12 PROBLEM — Z86.010 HISTORY OF COLONIC POLYPS: Status: ACTIVE | Noted: 2023-08-28

## 2024-02-12 PROBLEM — H10.10 ALLERGIC CONJUNCTIVITIS: Status: ACTIVE | Noted: 2024-02-12

## 2024-02-12 PROBLEM — D84.821 IMMUNODEFICIENCY DUE TO DRUGS (CODE) (MULTI): Status: ACTIVE | Noted: 2023-08-24

## 2024-02-12 PROBLEM — R21 RASH: Status: ACTIVE | Noted: 2023-04-03

## 2024-02-12 PROBLEM — T78.40XA ALLERGIC REACTION: Status: ACTIVE | Noted: 2023-09-14

## 2024-02-12 PROBLEM — J18.9 PNEUMONIA: Status: ACTIVE | Noted: 2023-08-20

## 2024-02-12 PROBLEM — K81.9 CHOLECYSTITIS: Status: ACTIVE | Noted: 2023-09-20

## 2024-02-12 PROBLEM — M54.50 CHRONIC LOW BACK PAIN: Status: ACTIVE | Noted: 2023-05-07

## 2024-02-12 PROBLEM — J90 PLEURAL EFFUSION: Status: ACTIVE | Noted: 2023-09-25

## 2024-02-12 PROBLEM — G89.29 CHRONIC LOW BACK PAIN: Status: ACTIVE | Noted: 2023-05-07

## 2024-02-12 PROBLEM — R09.02 HYPOXIA: Status: ACTIVE | Noted: 2024-02-12

## 2024-02-12 PROBLEM — Z86.16 PERSONAL HISTORY OF COVID-19: Status: ACTIVE | Noted: 2023-08-24

## 2024-02-12 PROBLEM — Z20.822 CONTACT WITH AND (SUSPECTED) EXPOSURE TO COVID-19: Status: ACTIVE | Noted: 2023-08-24

## 2024-02-12 PROBLEM — N17.9 ACUTE RENAL FAILURE SUPERIMPOSED ON CHRONIC KIDNEY DISEASE (CMS-HCC): Status: ACTIVE | Noted: 2023-08-24

## 2024-02-12 PROBLEM — Z79.621 LONG TERM (CURRENT) USE OF CALCINEURIN INHIBITOR: Status: ACTIVE | Noted: 2023-08-24

## 2024-02-12 PROBLEM — N18.9 ACUTE RENAL FAILURE SUPERIMPOSED ON CHRONIC KIDNEY DISEASE (CMS-HCC): Status: ACTIVE | Noted: 2023-08-24

## 2024-02-12 PROBLEM — U07.1 DISEASE DUE TO SEVERE ACUTE RESPIRATORY SYNDROME CORONAVIRUS 2 (SARS-COV-2): Status: ACTIVE | Noted: 2023-08-09

## 2024-02-12 PROBLEM — Z86.0100 HISTORY OF COLONIC POLYPS: Status: ACTIVE | Noted: 2023-08-28

## 2024-02-12 PROBLEM — R05.9 COUGH: Status: ACTIVE | Noted: 2024-02-12

## 2024-02-12 RX ORDER — ISOPROPYL ALCOHOL 70 ML/100ML
SWAB TOPICAL
COMMUNITY
Start: 2024-01-24

## 2024-02-12 NOTE — TELEPHONE ENCOUNTER
Patient called requesting a call back from Ashley stating it s regarding her going into the hospital Monday and needing to know that status of some things before she goes. Patient did not wish to leave more information    Referral received, called patient to scheduled vascular appt

## 2024-02-13 ENCOUNTER — LAB (OUTPATIENT)
Dept: LAB | Facility: LAB | Age: 69
End: 2024-02-13
Payer: COMMERCIAL

## 2024-02-13 ENCOUNTER — OFFICE VISIT (OUTPATIENT)
Dept: CARDIOLOGY | Facility: HOSPITAL | Age: 69
End: 2024-02-13
Payer: COMMERCIAL

## 2024-02-13 VITALS
DIASTOLIC BLOOD PRESSURE: 85 MMHG | RESPIRATION RATE: 16 BRPM | OXYGEN SATURATION: 100 % | WEIGHT: 170 LBS | SYSTOLIC BLOOD PRESSURE: 182 MMHG | HEIGHT: 68 IN | HEART RATE: 80 BPM | BODY MASS INDEX: 25.76 KG/M2

## 2024-02-13 DIAGNOSIS — I10 PRIMARY HYPERTENSION: ICD-10-CM

## 2024-02-13 DIAGNOSIS — Z94.0 KIDNEY REPLACED BY TRANSPLANT (HHS-HCC): ICD-10-CM

## 2024-02-13 DIAGNOSIS — R07.9 CHEST PAIN, UNSPECIFIED TYPE: Primary | ICD-10-CM

## 2024-02-13 LAB
ALBUMIN SERPL BCP-MCNC: 3 G/DL (ref 3.4–5)
ANION GAP SERPL CALC-SCNC: 11 MMOL/L (ref 10–20)
BASOPHILS # BLD AUTO: 0.01 X10*3/UL (ref 0–0.1)
BASOPHILS NFR BLD AUTO: 0.4 %
BUN SERPL-MCNC: 48 MG/DL (ref 6–23)
CALCIUM SERPL-MCNC: 9.7 MG/DL (ref 8.6–10.6)
CHLORIDE SERPL-SCNC: 109 MMOL/L (ref 98–107)
CO2 SERPL-SCNC: 27 MMOL/L (ref 21–32)
CREAT SERPL-MCNC: 4.43 MG/DL (ref 0.5–1.3)
EGFRCR SERPLBLD CKD-EPI 2021: 14 ML/MIN/1.73M*2
EOSINOPHIL # BLD AUTO: 0.11 X10*3/UL (ref 0–0.7)
EOSINOPHIL NFR BLD AUTO: 4.5 %
ERYTHROCYTE [DISTWIDTH] IN BLOOD BY AUTOMATED COUNT: 14.2 % (ref 11.5–14.5)
GLUCOSE SERPL-MCNC: 126 MG/DL (ref 74–99)
HCT VFR BLD AUTO: 29.4 % (ref 41–52)
HGB BLD-MCNC: 9.7 G/DL (ref 13.5–17.5)
IMM GRANULOCYTES # BLD AUTO: 0.02 X10*3/UL (ref 0–0.7)
IMM GRANULOCYTES NFR BLD AUTO: 0.8 % (ref 0–0.9)
LYMPHOCYTES # BLD AUTO: 0.81 X10*3/UL (ref 1.2–4.8)
LYMPHOCYTES NFR BLD AUTO: 33.2 %
MCH RBC QN AUTO: 28.8 PG (ref 26–34)
MCHC RBC AUTO-ENTMCNC: 33 G/DL (ref 32–36)
MCV RBC AUTO: 87 FL (ref 80–100)
MONOCYTES # BLD AUTO: 0.36 X10*3/UL (ref 0.1–1)
MONOCYTES NFR BLD AUTO: 14.8 %
NEUTROPHILS # BLD AUTO: 1.13 X10*3/UL (ref 1.2–7.7)
NEUTROPHILS NFR BLD AUTO: 46.3 %
NRBC BLD-RTO: 0 /100 WBCS (ref 0–0)
PHOSPHATE SERPL-MCNC: 2.8 MG/DL (ref 2.5–4.9)
PLATELET # BLD AUTO: 122 X10*3/UL (ref 150–450)
POTASSIUM SERPL-SCNC: 5 MMOL/L (ref 3.5–5.3)
RBC # BLD AUTO: 3.37 X10*6/UL (ref 4.5–5.9)
SODIUM SERPL-SCNC: 142 MMOL/L (ref 136–145)
TACROLIMUS BLD-MCNC: 4.6 NG/ML
WBC # BLD AUTO: 2.4 X10*3/UL (ref 4.4–11.3)

## 2024-02-13 PROCEDURE — 1111F DSCHRG MED/CURRENT MED MERGE: CPT | Performed by: INTERNAL MEDICINE

## 2024-02-13 PROCEDURE — 1125F AMNT PAIN NOTED PAIN PRSNT: CPT | Performed by: INTERNAL MEDICINE

## 2024-02-13 PROCEDURE — 99214 OFFICE O/P EST MOD 30 MIN: CPT | Performed by: INTERNAL MEDICINE

## 2024-02-13 PROCEDURE — 80069 RENAL FUNCTION PANEL: CPT

## 2024-02-13 PROCEDURE — 85025 COMPLETE CBC W/AUTO DIFF WBC: CPT

## 2024-02-13 PROCEDURE — 3077F SYST BP >= 140 MM HG: CPT | Performed by: INTERNAL MEDICINE

## 2024-02-13 PROCEDURE — 87799 DETECT AGENT NOS DNA QUANT: CPT

## 2024-02-13 PROCEDURE — 36415 COLL VENOUS BLD VENIPUNCTURE: CPT

## 2024-02-13 PROCEDURE — 3008F BODY MASS INDEX DOCD: CPT | Performed by: INTERNAL MEDICINE

## 2024-02-13 PROCEDURE — 80197 ASSAY OF TACROLIMUS: CPT

## 2024-02-13 PROCEDURE — 1159F MED LIST DOCD IN RCRD: CPT | Performed by: INTERNAL MEDICINE

## 2024-02-13 PROCEDURE — 1036F TOBACCO NON-USER: CPT | Performed by: INTERNAL MEDICINE

## 2024-02-13 PROCEDURE — 3079F DIAST BP 80-89 MM HG: CPT | Performed by: INTERNAL MEDICINE

## 2024-02-13 PROCEDURE — RXMED WILLOW AMBULATORY MEDICATION CHARGE

## 2024-02-13 PROCEDURE — 3051F HG A1C>EQUAL 7.0%<8.0%: CPT | Performed by: INTERNAL MEDICINE

## 2024-02-13 PROCEDURE — 1160F RVW MEDS BY RX/DR IN RCRD: CPT | Performed by: INTERNAL MEDICINE

## 2024-02-13 ASSESSMENT — ENCOUNTER SYMPTOMS
DEPRESSION: 0
LOSS OF SENSATION IN FEET: 0
OCCASIONAL FEELINGS OF UNSTEADINESS: 0

## 2024-02-13 ASSESSMENT — PATIENT HEALTH QUESTIONNAIRE - PHQ9
SUM OF ALL RESPONSES TO PHQ9 QUESTIONS 1 AND 2: 0
1. LITTLE INTEREST OR PLEASURE IN DOING THINGS: NOT AT ALL
2. FEELING DOWN, DEPRESSED OR HOPELESS: NOT AT ALL

## 2024-02-13 ASSESSMENT — PAIN SCALES - GENERAL: PAINLEVEL: 0-NO PAIN

## 2024-02-13 NOTE — PROGRESS NOTES
Subjective   Manolo Bashir is a 68 y.o. male who presents for cardiology review of chest pain and hypertension on a background of ESRD (status post renal transplant in 1992 and 2013), CKD stage III, MGUS, type 2 diabetes, hypertension, prostate cancer status post resection, chronic urinary incontinence.       Investigations:  TTE (11/2023) - LVEF 60-65%, dilated LV, severe eccentric LVH, LA moderately dilated.  EKG (1/2024) - SR, non-specific intraventricular block.     Chief Complaint:  Follow-up    HPI  Checks BP at home - 130-140/70-80 mmHg. Had not taken BP this morning as due to have lab work.   No chest pain.   Does ADLs - shopping etc, ok with that.   Cr 5.74 - weekly draws as bhavani-dialysis. Being worked up for potential HD.  No leg swelling.   Has lost weight, reports occasional nausea/vomiting.     ROS  A 10-system review was performed and was unremarkable apart from what is presented in the HPI.     Objective   Physical Exam  Alert and orientated.   Appropriate responses, normal affect.  No respiratory distress at rest.   Skin warm and dry.   Normal radial pulse character and volume. Clinically SR.   Anicteric sclera, no conjunctival pallor.   No JVD or carotid bruits.  Heart sounds dual, no added heart sounds or audible murmurs.   Chest clear on auscultation.   Calves soft, non-tender.  No pedal edema.    Lab Review:   Lab Results   Component Value Date     02/05/2024    K 4.9 02/05/2024     (H) 02/05/2024    CO2 25 02/05/2024    BUN 72 (H) 02/05/2024    CREATININE 5.74 (H) 02/05/2024    GLUCOSE 101 (H) 02/05/2024    CALCIUM 9.8 02/05/2024     Lab Results   Component Value Date    WBC 2.4 (L) 02/05/2024    HGB 9.0 (L) 02/05/2024    HCT 28.1 (L) 02/05/2024    MCV 90 02/05/2024     (L) 02/05/2024     Lab Results   Component Value Date    CHOL 99 10/26/2023    TRIG 46 10/26/2023    HDL 48.6 10/26/2023       Assessment/Plan   In summary, Manolo Bashir is a 68 y.o. male who presents for  cardiology review of chest pain and hypertension on a background of ESRD (status post renal transplant in 1992 and 2013), CKD stage III, MGUS, type 2 diabetes, hypertension, prostate cancer status post resection, chronic urinary incontinence.  He reports improved blood pressure control on his current medications however he had not taken this morning and states this is because he was due to have lab work.  He was euvolemic on examination today with systolic hypertension.  He previously had a regadenoson stress cardiac MRI ordered for investigation of chest pain and this test is pending.  I have advised him to continue his current medications and I will follow-up with him after his stress MRI result is available.

## 2024-02-13 NOTE — PATIENT INSTRUCTIONS
Your cardiovascular examination today was satisfactory and your blood pressures have been well-controlled on current therapy.    Continue your current medications.    I will discuss the results of your stress cardiac MRI with you after your test.  If there are no significant abnormalities will follow-up with you again in 12 months to assess your progress.

## 2024-02-14 ENCOUNTER — PHARMACY VISIT (OUTPATIENT)
Dept: PHARMACY | Facility: CLINIC | Age: 69
End: 2024-02-14
Payer: MEDICAID

## 2024-02-15 LAB
BKV DNA SERPL NAA+PROBE-LOG#: NORMAL {LOG_COPIES}/ML
LABORATORY COMMENT REPORT: NOT DETECTED

## 2024-02-16 ENCOUNTER — TELEPHONE (OUTPATIENT)
Dept: OPERATING ROOM | Facility: CLINIC | Age: 69
End: 2024-02-16
Payer: COMMERCIAL

## 2024-02-16 DIAGNOSIS — M54.10 RADICULITIS: ICD-10-CM

## 2024-02-20 ENCOUNTER — OFFICE VISIT (OUTPATIENT)
Dept: TRANSPLANT | Facility: HOSPITAL | Age: 69
End: 2024-02-20
Payer: COMMERCIAL

## 2024-02-20 ENCOUNTER — LAB (OUTPATIENT)
Dept: LAB | Facility: LAB | Age: 69
End: 2024-02-20
Payer: COMMERCIAL

## 2024-02-20 VITALS
HEART RATE: 69 BPM | DIASTOLIC BLOOD PRESSURE: 53 MMHG | TEMPERATURE: 97.3 F | SYSTOLIC BLOOD PRESSURE: 107 MMHG | BODY MASS INDEX: 25.56 KG/M2 | WEIGHT: 168.1 LBS | OXYGEN SATURATION: 99 %

## 2024-02-20 DIAGNOSIS — N18.5 CKD (CHRONIC KIDNEY DISEASE) STAGE 5, GFR LESS THAN 15 ML/MIN (MULTI): ICD-10-CM

## 2024-02-20 DIAGNOSIS — N25.81 HYPERPARATHYROIDISM DUE TO RENAL INSUFFICIENCY (MULTI): ICD-10-CM

## 2024-02-20 DIAGNOSIS — I15.1 HYPERTENSION SECONDARY TO OTHER RENAL DISORDERS: ICD-10-CM

## 2024-02-20 DIAGNOSIS — Z48.298 AFTERCARE FOLLOWING ORGAN TRANSPLANT: ICD-10-CM

## 2024-02-20 DIAGNOSIS — D72.819 LEUKOPENIA, UNSPECIFIED TYPE: ICD-10-CM

## 2024-02-20 DIAGNOSIS — Z94.0 KIDNEY REPLACED BY TRANSPLANT (HHS-HCC): ICD-10-CM

## 2024-02-20 DIAGNOSIS — D84.9 IMMUNOSUPPRESSION (MULTI): ICD-10-CM

## 2024-02-20 DIAGNOSIS — Z94.0 KIDNEY REPLACED BY TRANSPLANT (HHS-HCC): Primary | ICD-10-CM

## 2024-02-20 DIAGNOSIS — E87.70 HYPERVOLEMIA, UNSPECIFIED HYPERVOLEMIA TYPE: ICD-10-CM

## 2024-02-20 LAB
ALBUMIN SERPL BCP-MCNC: 3.3 G/DL (ref 3.4–5)
ANION GAP SERPL CALC-SCNC: 12 MMOL/L (ref 10–20)
BASOPHILS # BLD AUTO: 0.02 X10*3/UL (ref 0–0.1)
BASOPHILS NFR BLD AUTO: 0.8 %
BUN SERPL-MCNC: 53 MG/DL (ref 6–23)
CALCIUM SERPL-MCNC: 9.4 MG/DL (ref 8.6–10.6)
CHLORIDE SERPL-SCNC: 106 MMOL/L (ref 98–107)
CO2 SERPL-SCNC: 28 MMOL/L (ref 21–32)
CREAT SERPL-MCNC: 6.06 MG/DL (ref 0.5–1.3)
EGFRCR SERPLBLD CKD-EPI 2021: 9 ML/MIN/1.73M*2
EOSINOPHIL # BLD AUTO: 0.07 X10*3/UL (ref 0–0.7)
EOSINOPHIL NFR BLD AUTO: 2.8 %
ERYTHROCYTE [DISTWIDTH] IN BLOOD BY AUTOMATED COUNT: 14.6 % (ref 11.5–14.5)
GLUCOSE SERPL-MCNC: 160 MG/DL (ref 74–99)
HCT VFR BLD AUTO: 27.6 % (ref 41–52)
HGB BLD-MCNC: 9 G/DL (ref 13.5–17.5)
IMM GRANULOCYTES # BLD AUTO: 0.07 X10*3/UL (ref 0–0.7)
IMM GRANULOCYTES NFR BLD AUTO: 2.8 % (ref 0–0.9)
LYMPHOCYTES # BLD AUTO: 0.83 X10*3/UL (ref 1.2–4.8)
LYMPHOCYTES NFR BLD AUTO: 32.9 %
MCH RBC QN AUTO: 29.3 PG (ref 26–34)
MCHC RBC AUTO-ENTMCNC: 32.6 G/DL (ref 32–36)
MCV RBC AUTO: 90 FL (ref 80–100)
MONOCYTES # BLD AUTO: 0.36 X10*3/UL (ref 0.1–1)
MONOCYTES NFR BLD AUTO: 14.3 %
NEUTROPHILS # BLD AUTO: 1.17 X10*3/UL (ref 1.2–7.7)
NEUTROPHILS NFR BLD AUTO: 46.4 %
NRBC BLD-RTO: 0 /100 WBCS (ref 0–0)
PHOSPHATE SERPL-MCNC: 3.9 MG/DL (ref 2.5–4.9)
PLATELET # BLD AUTO: 120 X10*3/UL (ref 150–450)
POTASSIUM SERPL-SCNC: 4.9 MMOL/L (ref 3.5–5.3)
RBC # BLD AUTO: 3.07 X10*6/UL (ref 4.5–5.9)
SODIUM SERPL-SCNC: 141 MMOL/L (ref 136–145)
TACROLIMUS BLD-MCNC: 5.3 NG/ML
WBC # BLD AUTO: 2.5 X10*3/UL (ref 4.4–11.3)

## 2024-02-20 PROCEDURE — 80197 ASSAY OF TACROLIMUS: CPT

## 2024-02-20 PROCEDURE — 85025 COMPLETE CBC W/AUTO DIFF WBC: CPT

## 2024-02-20 PROCEDURE — 99215 OFFICE O/P EST HI 40 MIN: CPT | Performed by: INTERNAL MEDICINE

## 2024-02-20 PROCEDURE — 3078F DIAST BP <80 MM HG: CPT | Performed by: INTERNAL MEDICINE

## 2024-02-20 PROCEDURE — 1126F AMNT PAIN NOTED NONE PRSNT: CPT | Performed by: INTERNAL MEDICINE

## 2024-02-20 PROCEDURE — RXMED WILLOW AMBULATORY MEDICATION CHARGE

## 2024-02-20 PROCEDURE — 1111F DSCHRG MED/CURRENT MED MERGE: CPT | Performed by: INTERNAL MEDICINE

## 2024-02-20 PROCEDURE — 1160F RVW MEDS BY RX/DR IN RCRD: CPT | Performed by: INTERNAL MEDICINE

## 2024-02-20 PROCEDURE — 1159F MED LIST DOCD IN RCRD: CPT | Performed by: INTERNAL MEDICINE

## 2024-02-20 PROCEDURE — 87799 DETECT AGENT NOS DNA QUANT: CPT

## 2024-02-20 PROCEDURE — 3008F BODY MASS INDEX DOCD: CPT | Performed by: INTERNAL MEDICINE

## 2024-02-20 PROCEDURE — 3074F SYST BP LT 130 MM HG: CPT | Performed by: INTERNAL MEDICINE

## 2024-02-20 PROCEDURE — 80069 RENAL FUNCTION PANEL: CPT

## 2024-02-20 PROCEDURE — 36415 COLL VENOUS BLD VENIPUNCTURE: CPT

## 2024-02-20 PROCEDURE — 1036F TOBACCO NON-USER: CPT | Performed by: INTERNAL MEDICINE

## 2024-02-20 PROCEDURE — 3051F HG A1C>EQUAL 7.0%<8.0%: CPT | Performed by: INTERNAL MEDICINE

## 2024-02-20 RX ORDER — ACETAMINOPHEN 325 MG/1
975 TABLET ORAL EVERY 6 HOURS PRN
Qty: 30 TABLET | Refills: 11 | Status: SHIPPED | OUTPATIENT
Start: 2024-02-20 | End: 2025-02-19

## 2024-02-20 RX ORDER — AMLODIPINE BESYLATE 10 MG/1
TABLET ORAL
Qty: 30 TABLET | Refills: 10 | Status: SHIPPED | OUTPATIENT
Start: 2024-02-20 | End: 2024-03-07 | Stop reason: ALTCHOICE

## 2024-02-20 RX ORDER — AZATHIOPRINE 50 MG/1
25 TABLET ORAL DAILY
Qty: 30 TABLET | Refills: 3 | Status: SHIPPED | OUTPATIENT
Start: 2024-02-20 | End: 2024-04-06 | Stop reason: HOSPADM

## 2024-02-20 RX ORDER — PRAVASTATIN SODIUM 40 MG/1
40 TABLET ORAL NIGHTLY
Qty: 30 TABLET | Refills: 10 | Status: SHIPPED | OUTPATIENT
Start: 2024-02-20 | End: 2024-03-16 | Stop reason: HOSPADM

## 2024-02-20 ASSESSMENT — PAIN SCALES - GENERAL: PAINLEVEL: 0-NO PAIN

## 2024-02-20 NOTE — PROGRESS NOTES
TRANSPLANT NEPHROLOGY :   OUTPATIENT CLINIC NOTE      SERVICE DATE : 02/20/2024     REASON FOR VISIT/CHIEF COMPLAINT:  S/P  TRANSPLANT SURGERY  IMMUNOSUPPRESSIVE MEDICATION MANAGEMENT  BLOOD PRESSURE MANAGEMENT    HPI:    Manolo Bashir is a 68 y.o.M with PMH ESRD (on HD [id number]), s/p 2x renal transplants (1992, 2013) now with impaired allograft function CKD 3 (baseline Cr 2.5-3), on immunosuppression (pred, tac, azathioprine), h/op IgG and IgA lambda MGUS (recent hematologic eval including BM bx with no e/o myeloma), DM2, HTN, prostate cancer s/p radical prostatectomy, baseline urinary incontinence, HCV s/p rx (PCR neg 10/2023) .  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    -Patient completed 2 doses of rituximab total of 2 g.    Recent hospital admission as of 1/16/2024 with hypertensive urgency with fluid overload.    Last seen in clinic 2 weeks ago. Noted to have uptrending Cr at the time.     Since last visit, pt reports much improved LE swelling. Had decreased torsemide to 40 mg once daily only with good control of edema, stable wt.     Seen by cardiology recently. Stress test pending.     Had an episode of lightheadedness yest, blood sugar low 60's at the time. Home Bps usually 130/80s. Office BP relatively low today, 107/53.     No new complaints. Denied chest pain, SOB, COLLIER, Palpitation. Normal urination and bowel movement. Normal gait and no weakness of arms/legs. No cough, runny nose, sore throat, cold symptoms, or rash. No hearing loss. Normal vision.No problems with his sleep, mood and function. No recent infection, hospitalization, surgery or ER visits.      ROS:  Review of  14 systems was performed system by system. See HPI. Otherwise, the symptoms were negative.    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/23/2023    HTN (hypertension)     Other long term (current) drug therapy 07/20/2021    High risk medication use    Personal history of other diseases of the circulatory system     Personal history of cardiac murmur    Personal history of other infectious and parasitic diseases 08/17/2015    History of hepatitis    Polyp, colonic 08/17/2023    Primary osteoarthritis of both ankles 08/17/2023    Tubular adenoma of colon 08/17/2023    Unspecified kidney failure 08/17/2016    Renal failure        PAST SURGICAL HISTORY:  Past Surgical History:   Procedure Laterality Date    ILEOSTOMY  04/25/2017    Ileostomy    ILEOSTOMY CLOSURE  08/17/2015    Ileostomy Closure    OTHER SURGICAL HISTORY  04/21/2017    Right Hemicolectomy    OTHER SURGICAL HISTORY  08/17/2015    Arteriovenous Surgery Creation Of A-V Fistula    OTHER SURGICAL HISTORY  08/17/2015    Sigmoidoscopy (Fiberoptic, Therapeutic )    PROSTATECTOMY  10/11/2013    Prostatectomy Radical    TRANSPLANT, KIDNEY, OPEN  1992    TRANSPLANT, KIDNEY, OPEN  2013    US GUIDED PERCUTANEOUS BIOPSY RENAL LEFT Left 11/20/2023    US GUIDED PERCUTANEOUS BIOPSY RENAL LEFT 11/20/2023 Lou Rodgers MD [facility]    US GUIDED PERCUTANEOUS PERITONEAL OR RETROPERITONEAL FLUID COLLECTION DRAINAGE  10/20/2022    US GUIDED PERCUTANEOUS PERITONEAL OR RETROPERITONEAL FLUID COLLECTION DRAINAGE 10/20/2022 [facility] CLINICAL LEGACY        SOCIAL HISTORY:  Social History     Socioeconomic History    Marital status: Single     Spouse name: Not on file    Number of children: Not on file    Years of education: Not on file    Highest education level: Not on file   Occupational History    Not on file   Tobacco Use    Smoking status: Never    Smokeless tobacco: Never    Vaping Use    Vaping Use: Never used   Substance and Sexual Activity    Alcohol use: Yes    Drug use: Yes     Types: Oxycodone    Sexual activity: Not on file   Other Topics Concern    Not on file   Social History Narrative    Not on file     Social Determinants of Health     Financial Resource Strain: Patient Declined (1/18/2024)    Overall Financial Resource Strain (CARDIA)     Difficulty of Paying Living Expenses: Patient declined   Food Insecurity: No Food Insecurity (10/3/2023)    Hunger Vital Sign     Worried About Running Out of Food in the Last Year: Never true     Ran Out of Food in the Last Year: Never true   Transportation Needs: Patient Declined (1/18/2024)    PRAPARE - Transportation     Lack of Transportation (Medical): Patient declined     Lack of Transportation (Non-Medical): Patient declined   Physical Activity: Unknown (10/3/2023)    Exercise Vital Sign     Days of Exercise per Week: 2 days     Minutes of Exercise per Session: Patient declined   Recent Concern: Physical Activity - Insufficiently Active (10/3/2023)    Exercise Vital Sign     Days of Exercise per Week: 2 days     Minutes of Exercise per Session: 30 min   Stress: No Stress Concern Present (10/3/2023)    [place] [employer] - Occupational Stress Questionnaire     Feeling of Stress : Not at all   Social Connections: Unknown (10/3/2023)    Social Connection and Isolation Panel [NHANES]     Frequency of Communication with Friends and Family: More than three times a week     Frequency of Social Gatherings with Friends and Family: Twice a week     Attends L[employer] day Services: Patient declined     Active Member of Clubs or Organizations: Patient declined     Attends Club or Organization Meetings: Patient declined     Marital Status: Never    Intimate Partner Violence: Not At Risk (10/3/2023)    Humiliation, Afraid, Rape, and Kick questionnaire     Fear of Current or Ex-Partner: No     Emotionally Abused: No      Physically Abused: No     Sexually Abused: No   Housing Stability: Patient Declined (1/18/2024)    Housing Stability Vital Sign     Unable to Pay for Housing in the Last Year: Patient declined     Number of Places Lived in the Last Year: 1     Unstable Housing in the Last Year: Patient declined       FAMILY HISTORY:  Family History   Problem Relation Name Age of Onset    Bone cancer Mother      Other (corona's sarcome of the bone marrow) Mother      Prostate cancer Father      Diabetes Other Family Hist     Hypertension Other Family Hist        MEDICATION LIST:  Current Outpatient Medications   Medication Instructions    acetaminophen (Tylenol) 325 mg tablet 3 tablets, oral, Every 6 hours PRN    aspirin 81 mg, oral, Daily    azaTHIOprine (IMURAN) 50 mg, oral, Daily    calcitriol (Rocaltrol) 0.25 mcg capsule Take 1 capsule (0.25 mcg) by mouth once daily. Do not start before December 22, 2023.    carvedilol (COREG) 37.5 mg, oral, 2 times daily    cholecalciferol (Vitamin D-3) 25 MCG (1000 UT) tablet Take 1 tablet (1,000 Units) by mouth once daily. Do not start before December 22, 2023.    cinacalcet (SENSIPAR) 30 mg, oral, Daily    diclofenac sodium (Voltaren) 1 % gel gel Apply 1 Application (4 grams)  topically 4 times a day as needed (back pain).    docusate sodium (Colace) 100 mg capsule oral, Daily PRN    Easy Touch Alcohol Prep Pads pads, medicated     gabapentin (Neurontin) 300 mg capsule oral    hydrALAZINE (APRESOLINE) 100 mg, oral, 3 times daily    insulin glargine (LANTUS U-100 INSULIN) 24 Units, subcutaneous, Every 24 hours, Take as directed per insulin instructions.    insulin lispro (HumaLOG) 100 unit/mL injection subcutaneous, 3 times daily with meals, 100-199 2 units 200-299 4 units 300-399 6 units     isosorbide mononitrate ER (Imdur) 60 mg 24 hr tablet Take 1 tablet (60 mg) by mouth once daily. Do not crush or chew. Do not start before January 22, 2024.    lancets (Unilet Super Thin Lancets) 30 gauge  "misc 3 times daily    lidocaine 4 % patch Place 1 patch over 12 hours on the skin once daily. Remove & discard patch within 12 hours or as directed by MD. Do not start before January 22, 2024.    Lokelma 5 g, oral, Daily    multivitamin tablet 1 tablet, oral, Daily    NIFEdipine ER (ADALAT CC) 60 mg, oral, 2 times daily, Do not crush, chew, or split.    ondansetron (ZOFRAN) 4 mg, oral, Every 8 hours PRN    pantoprazole (ProtoNix) 40 mg EC tablet Take 1 tablet (40 mg) by mouth once daily in the morning. Take before meals. Do not crush, chew, or split. Do not start before January 22, 2024.    pen needle, diabetic (TechLITE Pen Needle) 32 gauge x 5/32\" needle 1 each, miscellaneous, 3 times daily    pravastatin (PRAVACHOL) 10 mg, oral, Nightly    predniSONE (Deltasone) 5 mg tablet Take 1 tablet (5 mg) by mouth once daily in the morning. Do not start before January 22, 2024.    Prograf 1.5 mg, oral, Every 12 hours scheduled (0630,1830)    sulfamethoxazole-trimethoprim (Bactrim) 400-80 mg tablet 1 tablet, oral, 2 times daily    torsemide (DEMADEX) 40 mg, oral, 2 times daily with meals    Unilet Super Thin Lancets 30 gauge misc 1 each, miscellaneous, 3 times daily       ALLERGY  No Known Allergies    PHYSICAL EXAM:    Visit Vitals  /53   Pulse 69   Temp 36.3 °C (97.3 °F) (Temporal)   Wt 76.2 kg (168 lb 1.6 oz)   SpO2 99%   BMI 25.56 kg/m²   Smoking Status Never   BSA 1.91 m²          General Appearance - NAD, Good speech, oriented and alert  HEENT - Supple. Not pale. No jaundice. No cervical lymphadenopathy.   CVS - RRR. Normal S1/S2. No rub or gallop  Lungs- clear to auscultation bilaterally  Abdomen - soft , not tender, no guarding, no rigidity. Transplanted kidney is not tender.   Musculoskeletal /Extremities - trace b/l ankle edema. Nonfunctioning b/l upper ext AVF  Neuro/Psych - appropriate mood and affect. Motor power V/V all extremities. CN I -XII were grossly intact.  Skin - No visible rash      LABS:    Lab " Results   Component Value Date    CREATININE 4.43 (H) 02/13/2024    BUN 48 (H) 02/13/2024     02/13/2024    K 5.0 02/13/2024     (H) 02/13/2024    CO2 27 02/13/2024     Lab Results   Component Value Date    .7 (H) 01/18/2024    CALCIUM 9.7 02/13/2024    PHOS 2.8 02/13/2024     Lab Results   Component Value Date    WBC 2.4 (L) 02/13/2024    HGB 9.7 (L) 02/13/2024    HCT 29.4 (L) 02/13/2024    MCV 87 02/13/2024     (L) 02/13/2024     Lab Results   Component Value Date    IRON 41 10/18/2023    TIBC 170 (L) 10/18/2023    FERRITIN 574 (H) 09/20/2023     Lab Results   Component Value Date    TACROLIMUS 4.6 02/13/2024    CMVPCRIU NOT DETECTED 06/12/2023    BKVIRPCRQN Not Detected 01/06/2022    EBVDNAPCR Not Detected 01/16/2024     Lab Results   Component Value Date    CMVDNAPCR <35 Detected (A) 02/05/2024    BKVDNAPCR Not Detected 02/13/2024    EBVDNAPCR Not Detected 01/16/2024         ASSESSMENT AND PLAN:    Mr. Bashir is a 68 y.o. male  who is here for follow up s/p kidney transplant.    TRANSPLANT DATE: 7/13/2013 (Kidney), 3/26/1994 (Kidney)    Mr. Bashir is a 68 y.o. male  who is here for follow up s/p kidney transplant.     TRANSPLANT DATE: 7/13/2013 (Kidney), 3/26/1994 (Kidney)        1. ESRD S/P kidney transplant   - Creatinine last check was 4.4, slightly better (eGFR 14, from 10). Recent Cr likely elevated in the setting of supratherapeutic FK level.   - impaired allograft function, CKD stage 4-5  - s/p ritux 1gm x2 for immune complex GN noted on recent allograft bx.   -Random urine protein/creatinine ratio is 5.22 12/2023, off losartan sec to advanced CKD and hyperK.   -avoid volume depletion  - Avoid nephrotoxic medications, NSAIDs, and IV contrast.  - scheduled for upper ext US for dialysis access placement.     2. Immunosuppression  -Tacrolimus level last check was 4.6, acceptable. Goal 4-6.   -Continue current immunosuppression regimen: tac 1.5 mg bid, pred 5mg/d. Decr Imuran to 25mg/d  (WBC 2-2.5).      3. HTN urgency:   - /53 today. Pt asympotimatic. Home Bps usually 130/80s per pt.   - monitor home Bps regularly, call if trends concerning  - hypervolemia controlled. Cont torsemide 40 mg/d.   - hold parameters reviewed.      4.. Electrolytes:  - hyperkalemia: ARB on hold. Cont Lokelma 5 g/d.   - other metabolic parameters acceptable.      5. Bone Mineral Disease/Osteoporosis  - Ca, phos acceptbale. PTH improving, on Sensipar 30 mg/d.      6.Anemia/Leukopenia:  - Hb 9.7, slightly better. On PO iron. Will start DARIANA if Hb <9 on follow up labs.   - WBC low 2.4k. reduce Imuran to 25 mg/d. ANC acceptable.      7.Health maintenance and vaccination  - Flu shot during flu season annually  - Cancer screening is up to date per the patient     Lab : Routine transplant lab ( CBC, RFP, and anti-rejection trough level ) every 2 weeks  Additional labs:  VIT D, PTH Q3 months  Viral screening PCR, Allosure and UPC per protocol.     Additional Plan :  - needs dialysis access.  - discuss re-transplant

## 2024-02-21 ENCOUNTER — OFFICE VISIT (OUTPATIENT)
Dept: SURGERY | Facility: CLINIC | Age: 69
End: 2024-02-21
Payer: COMMERCIAL

## 2024-02-21 VITALS
WEIGHT: 170 LBS | DIASTOLIC BLOOD PRESSURE: 65 MMHG | HEIGHT: 68 IN | SYSTOLIC BLOOD PRESSURE: 130 MMHG | TEMPERATURE: 96.9 F | BODY MASS INDEX: 25.76 KG/M2 | HEART RATE: 68 BPM

## 2024-02-21 DIAGNOSIS — N18.5 CKD (CHRONIC KIDNEY DISEASE) STAGE 5, GFR LESS THAN 15 ML/MIN (MULTI): Primary | ICD-10-CM

## 2024-02-21 LAB
BKV DNA SERPL NAA+PROBE-LOG#: NORMAL {LOG_COPIES}/ML
LABORATORY COMMENT REPORT: NOT DETECTED

## 2024-02-21 PROCEDURE — 3078F DIAST BP <80 MM HG: CPT | Performed by: TRANSPLANT SURGERY

## 2024-02-21 PROCEDURE — 3075F SYST BP GE 130 - 139MM HG: CPT | Performed by: TRANSPLANT SURGERY

## 2024-02-21 PROCEDURE — 1036F TOBACCO NON-USER: CPT | Performed by: TRANSPLANT SURGERY

## 2024-02-21 PROCEDURE — 1160F RVW MEDS BY RX/DR IN RCRD: CPT | Performed by: TRANSPLANT SURGERY

## 2024-02-21 PROCEDURE — RXMED WILLOW AMBULATORY MEDICATION CHARGE

## 2024-02-21 PROCEDURE — 1125F AMNT PAIN NOTED PAIN PRSNT: CPT | Performed by: TRANSPLANT SURGERY

## 2024-02-21 PROCEDURE — 99214 OFFICE O/P EST MOD 30 MIN: CPT | Performed by: TRANSPLANT SURGERY

## 2024-02-21 PROCEDURE — 3051F HG A1C>EQUAL 7.0%<8.0%: CPT | Performed by: TRANSPLANT SURGERY

## 2024-02-21 PROCEDURE — 3008F BODY MASS INDEX DOCD: CPT | Performed by: TRANSPLANT SURGERY

## 2024-02-21 PROCEDURE — 1159F MED LIST DOCD IN RCRD: CPT | Performed by: TRANSPLANT SURGERY

## 2024-02-21 ASSESSMENT — ENCOUNTER SYMPTOMS
CHILLS: 0
ADENOPATHY: 0
FREQUENCY: 0
DYSURIA: 0
AGITATION: 0
HALLUCINATIONS: 0
DIARRHEA: 0
LIGHT-HEADEDNESS: 0
CONFUSION: 0
COLOR CHANGE: 0
LOSS OF SENSATION IN FEET: 0
COUGH: 0
ARTHRALGIAS: 0
HEMATURIA: 0
WEAKNESS: 0
ABDOMINAL DISTENTION: 0
FEVER: 0
CONSTIPATION: 0
OCCASIONAL FEELINGS OF UNSTEADINESS: 0
SHORTNESS OF BREATH: 0
ABDOMINAL PAIN: 0
DEPRESSION: 0
EYES NEGATIVE: 1
DIZZINESS: 0

## 2024-02-21 ASSESSMENT — PATIENT HEALTH QUESTIONNAIRE - PHQ9
SUM OF ALL RESPONSES TO PHQ9 QUESTIONS 1 & 2: 0
2. FEELING DOWN, DEPRESSED OR HOPELESS: NOT AT ALL
1. LITTLE INTEREST OR PLEASURE IN DOING THINGS: NOT AT ALL

## 2024-02-21 ASSESSMENT — PAIN SCALES - GENERAL: PAINLEVEL: 6

## 2024-02-21 NOTE — PROGRESS NOTES
Subjective   Patient ID: Manolo Bashir is a 68 y.o. male who presents for AVF evaluation.  HPI  68 year old male h/o ESRD (previously on HD [id number]), s/p x2 renal transplant (1992, 2013), now impaired graft function, DM2, HTN, prostate CA, cholecystectomy    He is close in needing dialysis again. He has had multiple AVF placed, first in the left forearm then left upper arm and finally right forearm. He last used his right forearm fistula > 10 years ago before his last transplant.    His current GFR is 9      Review of Systems   Constitutional:  Negative for chills and fever.   HENT: Negative.  Negative for congestion.    Eyes: Negative.    Respiratory:  Negative for cough and shortness of breath.    Cardiovascular:  Negative for chest pain.   Gastrointestinal:  Negative for abdominal distention, abdominal pain, constipation and diarrhea.   Endocrine: Negative for cold intolerance and heat intolerance.   Genitourinary:  Negative for dysuria, frequency, hematuria and urgency.   Musculoskeletal:  Negative for arthralgias.   Skin:  Negative for color change.   Allergic/Immunologic: Negative for environmental allergies.   Neurological:  Negative for dizziness, weakness and light-headedness.   Hematological:  Negative for adenopathy.   Psychiatric/Behavioral:  Negative for agitation, confusion and hallucinations.        Objective   Vitals:    02/21/24 1357   BP: 130/65   Pulse: 68   Temp: 36.1 °C (96.9 °F)       Physical Exam  Constitutional:       Appearance: Normal appearance.   HENT:      Head: Normocephalic and atraumatic.      Nose: Nose normal.   Eyes:      Pupils: Pupils are equal, round, and reactive to light.   Cardiovascular:      Rate and Rhythm: Normal rate.   Pulmonary:      Effort: Pulmonary effort is normal. No respiratory distress.   Abdominal:      General: There is no distension.      Palpations: Abdomen is soft. There is no mass.   Musculoskeletal:         General: No swelling. Normal range of  motion.      Cervical back: Normal range of motion.   Skin:     General: Skin is warm and dry.   Neurological:      General: No focal deficit present.      Mental Status: He is alert and oriented to person, place, and time.   Psychiatric:         Mood and Affect: Mood normal.         Behavior: Behavior normal.     Left forearm has thrombosed AVF, Left upper arm had thrombosed AVF with segment of aneurysmal vessels  Right forearm has an AVF with minimal pulsatility     Assessment/Plan     Patient will obtain vein mapping 2/28/24.   My preference would be to create right upper arm AVF if cephalic/basilic feasible.  If only AVG is feasible. Will plan to create one on the LEFT (nondominant arm)

## 2024-02-22 ENCOUNTER — PHARMACY VISIT (OUTPATIENT)
Dept: PHARMACY | Facility: CLINIC | Age: 69
End: 2024-02-22
Payer: MEDICAID

## 2024-02-22 ENCOUNTER — EVALUATION (OUTPATIENT)
Dept: PHYSICAL THERAPY | Facility: CLINIC | Age: 69
End: 2024-02-22
Payer: COMMERCIAL

## 2024-02-22 DIAGNOSIS — M54.10 RADICULITIS: ICD-10-CM

## 2024-02-22 PROCEDURE — 97110 THERAPEUTIC EXERCISES: CPT | Mod: GP

## 2024-02-22 PROCEDURE — 97162 PT EVAL MOD COMPLEX 30 MIN: CPT | Mod: GP

## 2024-02-22 ASSESSMENT — PAIN - FUNCTIONAL ASSESSMENT: PAIN_FUNCTIONAL_ASSESSMENT: 0-10

## 2024-02-22 ASSESSMENT — ENCOUNTER SYMPTOMS
OCCASIONAL FEELINGS OF UNSTEADINESS: 0
LOSS OF SENSATION IN FEET: 0
DEPRESSION: 0

## 2024-02-22 ASSESSMENT — PAIN SCALES - GENERAL: PAINLEVEL_OUTOF10: 8

## 2024-02-22 NOTE — PROGRESS NOTES
"Physical Therapy    Physical Therapy Evaluation    Patient Name: Manolo Bashir  MRN: [id number]  Today's Date: 2/22/2024  Time Calculation  Start Time: 0945  Stop Time: 1030  Time Calculation (min): 45 min  Visit #1     Assessment; Patient presents with chronic back pain without referred symptoms into buttocks or extremities. No directional preference clearly present. Patient may have benefit from resting back in prone position and starting frequent gentle lumbar extensions reps from prone and standing. Provisional classification is lumbar derangement syndrome. PT recommends 5 PT. Discussion on PT POC understood. Patient agreeable with motivated to follow up with PT instructions and hopeful to manage his pain better       Plan; Patient education, therapeutic exercises, therapeutic activity, manual therapy, pelvic traction, kinesio taping, MH/CP, HEP configuration  5 follow ups recommended prior to reassessment with evaluating PT       Current Problem  1. Radiculitis  Referral to Physical Therapy    Follow Up In Physical Therapy          Subjective   General:  General  Reason for Referral: PT eval and treat for back pain/radiculities  Referred By: Dr Vincent, SUDHEER  Past Medical History Relevant to Rehab: Hx of back pain, lasting 6 months now  Precautions:  Precautions  STEADI Fall Risk Score (The score of 4 or more indicates an increased risk of falling): 0     Pain:  Pain Assessment: 0-10  Pain Score: 8  Worthy-Baker FACES Pain Rating:  (all of my back)  Home Living: live with dtr  One step from side and 6 from front to get into house. There are stairs to get into upstairs bedroom and bathroom     Prior Function Per Pt/Caregiver Report: WNL    Present symptoms;  7-8/10 back pain                                                    Pain is Unchanging for the most part    Paraesthesias N    Mechanical stresses; \"I don't do nothing just lay around, due to pain. I stay in bed or heated chair mostly    Functional Disabilities; " "\"I don't do nothing\"    Symptoms present since; 60 months    Symptoms commenced as result of; - unknown. I woke up one day and I was hurting    Better with lying off my back makes it better (fetal position). Drugs for pain when I am in hospital, but they don't give me drugs when I am at home    Worse with \"anything\"    Distrubed sleep; Yes. I wake up every few hours  Recent surgeries; Had gallbladder surgery in December of 2023  Recent MVA; No  Imaging ; X-ray. Scheduled for MRI  Unexplained weight loss; NO    Posture; fair to poor  No change with postural correction  Lumbar ROM loss;  Flexion WNL  Extension WNL  Lateral flexion to left/right; WNL  Hip glides to left WNL  Hip glides to right WNL    Repeated movement testing;   One flexion - no change  Repeated flexion from sitting - \"worse\"  Supine flexion - no change  Repeated lumbar flexion - \"worse\"  Prone lumbar extension - \"better\"  Repeated prone lumbar extensions - \"no change\"  Standing lumbar extension - not worse  Repeated lumbar extension - not worse  Hip glides to left - no change  Hip glides to right - no change    No dural signs (negative SLR test/Negative slump test)  Prone in neutral - \"better\"  Prone in sustained extension, on elbows - \"no change\"  Outcome Measures:  Modified Oswestry = 60%     OP EDUCATION/TREATMENT:  Therapeutic exercise x 15 minutes  Prone lying  Modified lumbar extensions from prone on elbow  Repeated standing lumbar extensions (VERONICA)  Sitting posture education and correction with use of lumbar roll  Exercises encouraged every 2-3 hours       Goals:  Active       PT Problem       PT Goal 1       Start:  02/22/24    Expected End:  05/10/24       Patient will reduce difficulties related to functional mobilities and quality of life, as evident by Modified Oswestry score reduction by 50 % or better         PT Goal 2       Start:  02/22/24    Expected End:  05/10/24       Patient will report reduced/abolished pain in mid and lower " back, indicated by grade of 0-3 on 0-10 pain scale          PT Goal 3       Start:  02/22/24    Expected End:  05/10/24       Patient will demonstrated and verbalized knowledge of postural corrections, as applicable to daily activities and function          PT Goal 4       Start:  02/22/24    Expected End:  05/10/24       Patient will demonstrated improved ROM of lumbar and thoracic spine, indicated normal ROM in all planes without pain production or exacerbation          PT Goal 5       Start:  02/22/24    Expected End:  05/10/24       Patient will demonstrated knowledge of HEP, its maintenance frequency, and associated benefits

## 2024-02-23 ENCOUNTER — OFFICE VISIT (OUTPATIENT)
Dept: DERMATOLOGY | Facility: CLINIC | Age: 69
End: 2024-02-23
Payer: COMMERCIAL

## 2024-02-23 DIAGNOSIS — R21 RASH AND OTHER NONSPECIFIC SKIN ERUPTION: ICD-10-CM

## 2024-02-23 DIAGNOSIS — D48.5 NEOPLASM OF UNCERTAIN BEHAVIOR OF SKIN: Primary | ICD-10-CM

## 2024-02-23 DIAGNOSIS — Z92.25 PERSONAL HISTORY OF IMMUNOSUPPRESSION THERAPY: ICD-10-CM

## 2024-02-23 DIAGNOSIS — L91.8 SKIN TAG: ICD-10-CM

## 2024-02-23 PROCEDURE — 1159F MED LIST DOCD IN RCRD: CPT | Performed by: STUDENT IN AN ORGANIZED HEALTH CARE EDUCATION/TRAINING PROGRAM

## 2024-02-23 PROCEDURE — 3051F HG A1C>EQUAL 7.0%<8.0%: CPT | Performed by: STUDENT IN AN ORGANIZED HEALTH CARE EDUCATION/TRAINING PROGRAM

## 2024-02-23 PROCEDURE — 1125F AMNT PAIN NOTED PAIN PRSNT: CPT | Performed by: STUDENT IN AN ORGANIZED HEALTH CARE EDUCATION/TRAINING PROGRAM

## 2024-02-23 PROCEDURE — 3008F BODY MASS INDEX DOCD: CPT | Performed by: STUDENT IN AN ORGANIZED HEALTH CARE EDUCATION/TRAINING PROGRAM

## 2024-02-23 PROCEDURE — 99213 OFFICE O/P EST LOW 20 MIN: CPT | Performed by: STUDENT IN AN ORGANIZED HEALTH CARE EDUCATION/TRAINING PROGRAM

## 2024-02-23 PROCEDURE — 88305 TISSUE EXAM BY PATHOLOGIST: CPT | Performed by: DERMATOLOGY

## 2024-02-23 PROCEDURE — 11102 TANGNTL BX SKIN SINGLE LES: CPT | Performed by: STUDENT IN AN ORGANIZED HEALTH CARE EDUCATION/TRAINING PROGRAM

## 2024-02-23 PROCEDURE — 1160F RVW MEDS BY RX/DR IN RCRD: CPT | Performed by: STUDENT IN AN ORGANIZED HEALTH CARE EDUCATION/TRAINING PROGRAM

## 2024-02-23 PROCEDURE — 1036F TOBACCO NON-USER: CPT | Performed by: STUDENT IN AN ORGANIZED HEALTH CARE EDUCATION/TRAINING PROGRAM

## 2024-02-23 PROCEDURE — RXMED WILLOW AMBULATORY MEDICATION CHARGE

## 2024-02-23 NOTE — PROGRESS NOTES
Subjective     Manolo Bashir is a 68 y.o. male who presents for the following: Suspicious Skin Lesion (Face - 1 month, applied alcohol /Kidney Transplant - 2013).     Review of Systems:  No other skin or systemic complaints other than what is documented elsewhere in the note.    The following portions of the chart were reviewed this encounter and updated as appropriate:          Skin Cancer History  No skin cancer on file.      Specialty Problems          Dermatology Problems    Rash        Objective   Well appearing patient in no apparent distress; mood and affect are within normal limits.    A focused skin examination was performed. All findings within normal limits unless otherwise noted below.    Assessment/Plan   1. Neoplasm of uncertain behavior of skin  Left Zygomatic Area  3 mm dome shaped papule              Skin biopsy  Type of biopsy: tangential    Informed consent: discussed and consent obtained    Timeout: patient name, date of birth, surgical site, and procedure verified    Procedure prep:  Patient was prepped and draped  Anesthesia: the lesion was anesthetized in a standard fashion    Anesthetic:  1% lidocaine w/ epinephrine 1-100,000 local infiltration  Instrument used: DermaBlade    Hemostasis achieved with: aluminum chloride    Outcome: patient tolerated procedure well    Post-procedure details: sterile dressing applied and wound care instructions given    Dressing type: petrolatum and bandage      Specimen 1 - Dermatopathology- DERM LAB  Differential Diagnosis: transplant recipient, numerous dome shaped papuels on face. Molluscum vs fungal infection vs other  Check Margins Yes/No?:    Comments:    Dermpath Lab: Routine Histopathology (formalin-fixed tissue)    Concerning lesion found on exam. DDX for lesion includes: molluscum vs fungal vs other. The need for biopsy to aid in diagnosis was discussed and recommended. Risks and benefits of biopsy were reviewed. See procedure note.    2. Personal  history of immunosuppression therapy    History of solid organ transplantation    On prograf, prednisone, imuran    Discussed that history of transplant increases risk of skin cancer and infection.         3. Rash and other nonspecific skin eruption  Head - Anterior (Face)  Numerous dome shaped papules with central dell    Molluscum ddx in immunosuppressed patient  Favor molluscum contagiosum   Ddx includes fungal infection, atypical warts, less likely nmsc  Biopsy today to aid in diagnosis  If molluscum will treat with topical retinoids

## 2024-02-24 ENCOUNTER — PHARMACY VISIT (OUTPATIENT)
Dept: PHARMACY | Facility: CLINIC | Age: 69
End: 2024-02-24
Payer: MEDICAID

## 2024-02-26 ENCOUNTER — DOCUMENTATION (OUTPATIENT)
Dept: TRANSPLANT | Facility: HOSPITAL | Age: 69
End: 2024-02-26
Payer: COMMERCIAL

## 2024-02-26 NOTE — PROGRESS NOTES
Late entry, lab review 2/23 with Dr. Arroyo   -  Cr. 6.06 up from 4.43  BUN 53 up from 48  K 4.9 - on [facility]   GFR 9   WBC 2.5, ANC 1.17  BK (-), Tac 5.3  Continue with labs every 2 weeks and f/u in 1 month as planned in clinic

## 2024-02-27 LAB
LABORATORY COMMENT REPORT: NORMAL
PATH REPORT.FINAL DX SPEC: NORMAL
PATH REPORT.GROSS SPEC: NORMAL
PATH REPORT.RELEVANT HX SPEC: NORMAL
PATH REPORT.TOTAL CANCER: NORMAL

## 2024-02-28 ENCOUNTER — OFFICE VISIT (OUTPATIENT)
Dept: ENDOCRINOLOGY | Facility: CLINIC | Age: 69
End: 2024-02-28
Payer: COMMERCIAL

## 2024-02-28 ENCOUNTER — HOSPITAL ENCOUNTER (OUTPATIENT)
Dept: VASCULAR MEDICINE | Facility: HOSPITAL | Age: 69
Discharge: HOME | End: 2024-02-28
Payer: COMMERCIAL

## 2024-02-28 VITALS
HEIGHT: 68 IN | WEIGHT: 171 LBS | SYSTOLIC BLOOD PRESSURE: 175 MMHG | HEART RATE: 74 BPM | DIASTOLIC BLOOD PRESSURE: 80 MMHG | BODY MASS INDEX: 25.91 KG/M2

## 2024-02-28 DIAGNOSIS — N18.6 END STAGE RENAL DISEASE (MULTI): ICD-10-CM

## 2024-02-28 DIAGNOSIS — E11.22 TYPE 2 DIABETES MELLITUS WITH STAGE 4 CHRONIC KIDNEY DISEASE, WITH LONG-TERM CURRENT USE OF INSULIN (MULTI): Primary | ICD-10-CM

## 2024-02-28 DIAGNOSIS — Z01.818 ENCOUNTER FOR OTHER PREPROCEDURAL EXAMINATION: ICD-10-CM

## 2024-02-28 DIAGNOSIS — Z79.4 TYPE 2 DIABETES MELLITUS WITH STAGE 4 CHRONIC KIDNEY DISEASE, WITH LONG-TERM CURRENT USE OF INSULIN (MULTI): Primary | ICD-10-CM

## 2024-02-28 DIAGNOSIS — Z99.2 CKD (CHRONIC KIDNEY DISEASE) STAGE V REQUIRING CHRONIC DIALYSIS (MULTI): ICD-10-CM

## 2024-02-28 DIAGNOSIS — N18.4 TYPE 2 DIABETES MELLITUS WITH STAGE 4 CHRONIC KIDNEY DISEASE, WITH LONG-TERM CURRENT USE OF INSULIN (MULTI): Primary | ICD-10-CM

## 2024-02-28 DIAGNOSIS — N18.6 CKD (CHRONIC KIDNEY DISEASE) STAGE V REQUIRING CHRONIC DIALYSIS (MULTI): ICD-10-CM

## 2024-02-28 DIAGNOSIS — Z94.0 KIDNEY REPLACED BY TRANSPLANT (HHS-HCC): ICD-10-CM

## 2024-02-28 PROCEDURE — 1159F MED LIST DOCD IN RCRD: CPT | Performed by: STUDENT IN AN ORGANIZED HEALTH CARE EDUCATION/TRAINING PROGRAM

## 2024-02-28 PROCEDURE — 93922 UPR/L XTREMITY ART 2 LEVELS: CPT | Performed by: INTERNAL MEDICINE

## 2024-02-28 PROCEDURE — 93985 DUP-SCAN HEMO COMPL BI STD: CPT | Performed by: INTERNAL MEDICINE

## 2024-02-28 PROCEDURE — 3077F SYST BP >= 140 MM HG: CPT | Performed by: STUDENT IN AN ORGANIZED HEALTH CARE EDUCATION/TRAINING PROGRAM

## 2024-02-28 PROCEDURE — 93985 DUP-SCAN HEMO COMPL BI STD: CPT

## 2024-02-28 PROCEDURE — 3051F HG A1C>EQUAL 7.0%<8.0%: CPT | Performed by: STUDENT IN AN ORGANIZED HEALTH CARE EDUCATION/TRAINING PROGRAM

## 2024-02-28 PROCEDURE — 3079F DIAST BP 80-89 MM HG: CPT | Performed by: STUDENT IN AN ORGANIZED HEALTH CARE EDUCATION/TRAINING PROGRAM

## 2024-02-28 PROCEDURE — 3008F BODY MASS INDEX DOCD: CPT | Performed by: STUDENT IN AN ORGANIZED HEALTH CARE EDUCATION/TRAINING PROGRAM

## 2024-02-28 PROCEDURE — 1036F TOBACCO NON-USER: CPT | Performed by: STUDENT IN AN ORGANIZED HEALTH CARE EDUCATION/TRAINING PROGRAM

## 2024-02-28 PROCEDURE — 93922 UPR/L XTREMITY ART 2 LEVELS: CPT | Mod: XS

## 2024-02-28 PROCEDURE — 1160F RVW MEDS BY RX/DR IN RCRD: CPT | Performed by: STUDENT IN AN ORGANIZED HEALTH CARE EDUCATION/TRAINING PROGRAM

## 2024-02-28 PROCEDURE — 99214 OFFICE O/P EST MOD 30 MIN: CPT | Performed by: STUDENT IN AN ORGANIZED HEALTH CARE EDUCATION/TRAINING PROGRAM

## 2024-02-28 PROCEDURE — 1125F AMNT PAIN NOTED PAIN PRSNT: CPT | Performed by: STUDENT IN AN ORGANIZED HEALTH CARE EDUCATION/TRAINING PROGRAM

## 2024-02-28 NOTE — PROGRESS NOTES
A/P  Manolo Bashir is a 68 y.o. male with pmh of h/o ESRD (previously on HD [id number]), s/p x2 renal transplant (1992, 2013), now impaired graft function, immune complex GN/proliferative glomerulonephritis , DM2, HTN, prostate CA s/p radical prostatectomy , cholecystectomy, h/o IgG and IgA lambda MGUS,  HCV s/p rx (PCR neg 10/2023)  , h/o thyroid nodules. Here for t2dm followup.     Values from glucometer downloaded and appears that he does have morning lows down to 60s on some days with highs up to 320 on other days. Confirmed with patient that he was checking these levels prior to breakfast and not after. Mealtime numbers variable anywhere from 120-330s.     In order to prevent am lows we will reduce glargine. We will increase his prandial insulin to target prandial glucose excursions.  He is going to start on dialysis soon so insulin needs will change at that time . Today he is going for vein mapping for AVF.     Recommendations  - Decrease Lantus to 20 units qam - we will refill   - Add prandial 3 units TIDAC. Do not take with evening snack as we want to correct morning time lows for now . We will add on prandial insulin for bedtime snack at next visit   - Decrease sliding scale to 1 unit for every 50 above 150 TIDAC  - Pt declines CGM and would like to continue with finger sticks   - At next visit will check fructosamine instead of A1C in the setting of ESRD    RTC in 3-4 months     HPI  Manolo Bashir is a 68 y.o. male with pmh of h/o ESRD (previously on HD [id number]), s/p x2 renal transplant (1992, 2013), now impaired graft function, immune complex GN/proliferative glomerulonephritis , DM2, HTN, prostate Valerie/p radical prostatectomy , cholecystectomy,. h/op IgG and IgA lambda MGUS,  HCV s/p rx (PCR neg 10/2023)  , goiter with thyroid nodules. Here for t2dm followup.     Interval hx:  LCV 10/2023. He was asked to start trulicity at that visit but wanted to hold off. He also declined cgm.  Recent [facility]  admission on 1/5/24  for  for expedited rituximab infusion , he developed hyperkalemia and ISIAH and losartan held at discharge. Readmission on  1/16/2024 with hypertensive urgency with fluid overload .   Per recent transplant nephrology note he had an episode of lightheadedness yest, blood sugar low 60's at the time.     Endocrinologist: Previously seen by Dr Hernandez then switched to Dr. Quinonez   Last A1c   Lab Results   Component Value Date    HGBA1C 7.7 (H) 01/20/2024      Home regimen:   Lantus 24 daily qam   Insulin sliding scale with meals 2 units for every 50 above 150  TIDAC and w/ bedtime snack    DM diagnosed 13 years  ago, post transplant kidney 2/2 HTN   On chronic prednisone 5mg   Complications Micro and Macro-with residual CKD   Accuchecks/ cgm: smbg- 3 times a day  BG range:   LDL: 41 Statin: pravastatin 40   ASA: 81  mg daily  Hypoglycemia frequency:  twice a week down [id number]   Hypoglycemia awareness: yes, lightheadedness and warm  BMI 25, wt 170 lbs     Diabetes Complications:   Retinopathy: opthal sees in outside facility a few months ago- states he has glaucoma and they recommended surgery   Foot ulcers:  pt states he has no ulcers  Podiatry -  seen at [facility] a couple months ago and got boots   Diabetic neuropathy : none  Nephropathy :ckd 4-5,  GFR 9, ma/cr Random urine protein/creatinine ratio is 5.22 12/2023, off losartan sec to advanced CKD and hyperK     Nutrition  Diet:  Breakfast(8am): monahan eggs grits   Lunch noon: sandwich fruit cup  Dinner (6pm): porkchops, mashed potato brocolli  Snack: cookies before bed - gives sliding sclae with this  Beverages: juices    Exercise: none,ankle pain preempts this     Thyroid nodule:   Thyroid ultrasound in 2022 -homogenous with no suspicious nodules, there are multiple cervical lymph nodes characterized as reactive and benign appearing  He has ultrasounds as far back as 2019     Leg swelling intermittently.No chest pain ,sob . [facility] D[facility]  admissions. No diarrhea. Endorses vomiting intermittently in the morning - happened this morning .     12 point ROS negative except as stated above    Past Medical History  He has a past medical history of Chronic kidney disease, stage 3 unspecified (CMS/HCC) (09/26/2018), COVID-19 (06/18/2020), Diabetes (CMS/[facility]), Focal and segmental proliferative glomerulonephritis (12/23/2023), HTN (hypertension), Other long term (current) drug therapy (07/20/2021), Personal history of other diseases of the circulatory system, Personal history of other infectious and parasitic diseases (08/17/2015), Polyp, colonic (08/17/2023), Primary osteoarthritis of both ankles (08/17/2023), Tubular adenoma of colon (08/17/2023), and Unspecified kidney failure (08/17/2016).    Surgical History  He has a past surgical history that includes Prostatectomy (10/11/2013); Ileostomy (04/25/2017); Other surgical history (04/21/2017); Ileostomy closure (08/17/2015); Other surgical history (08/17/2015); Other surgical history (08/17/2015); US guided percutaneous peritoneal or retroperitoneal fluid collection drainage (10/20/2022); transplant, kidney, open (1992); transplant, kidney, open (2013); and US guided percutaneous biopsy renal left (Left, 11/20/2023).     Social History  He reports that he has never smoked. He has never used smokeless tobacco. He reports current alcohol use. He reports current drug use. Drug: Oxycodone.    Family History  Family History   Problem Relation Name Age of Onset    Bone cancer Mother      Other (corona's sarcome of the bone marrow) Mother      Prostate cancer Father      Diabetes Other Family Hist     Hypertension Other Family Hist         Objective   There were no vitals taken for this visit.    PE:  Constitutional: NAD, well groomed. AOx3. Cooperative  Skin/Hair: Warm, dry skin. No evidence of lipodystrophy over abdomen.   HEENT: EOMI, Anicteric scleras   Neck: Soft, supple   Cardiovascular: normal HR  Respiratory:  "no increased wob,  accessory muscle use.  Abdomen - Obese, soft  , nondistended , nontender   Psych : appropriate affect    MSK- trace LE edema.     Lab Review  Lab Results   Component Value Date    HGBA1C 7.7 (H) 01/20/2024    HGBA1C 6.0 (H) 10/26/2023    HGBA1C 8.4 (A) 07/19/2023     02/20/2024    K 4.9 02/20/2024     02/20/2024    CO2 28 02/20/2024    BUN 53 (H) 02/20/2024    CREATININE 6.06 (H) 02/20/2024    CALCIUM 9.4 02/20/2024    ALBUMIN 3.3 (L) 02/20/2024    PROT 5.5 (L) 02/01/2024    BILITOT 0.3 02/01/2024    ALKPHOS 61 02/01/2024    ALT 13 02/01/2024    AST 11 02/01/2024    GLUCOSE 160 (H) 02/20/2024    CHOL 99 10/26/2023    TRIG 46 10/26/2023    HDL 48.6 10/26/2023      Glucose (mg/dL)   Date Value   02/20/2024 160 (H)   02/13/2024 126 (H)   02/05/2024 101 (H)     Hemoglobin A1C (%)   Date Value   01/20/2024 7.7 (H)   10/26/2023 6.0 (H)   07/19/2023 8.4 (A)   10/07/2022 10.6 (A)   08/03/2022 9.8 (A)     Bicarbonate (mmol/L)   Date Value   02/20/2024 28   02/13/2024 27   02/05/2024 25     Urea Nitrogen (mg/dL)   Date Value   02/20/2024 53 (H)   02/13/2024 48 (H)   02/05/2024 72 (H)     Creatinine (mg/dL)   Date Value   02/20/2024 6.06 (H)   02/13/2024 4.43 (H)   02/05/2024 5.74 (H)     Lab Results   Component Value Date    CHOL 99 10/26/2023    CHOL 128 03/17/2022    CHOL 94 02/25/2021     Lab Results   Component Value Date    HDL 48.6 10/26/2023    HDL 45.4 03/17/2022    HDL 38.0 (A) 02/25/2021     Lab Results   Component Value Date    LDLCALC 41 10/26/2023     Lab Results   Component Value Date    TRIG 46 10/26/2023    TRIG 110 03/17/2022    TRIG 31 02/25/2021     No components found for: \"CHOLHDL\"   Lab Results   Component Value Date    TSH 1.94 07/19/2023     No results found for: \"ALBUR\", \"[id number]\"    "

## 2024-02-29 ENCOUNTER — PREP FOR PROCEDURE (OUTPATIENT)
Dept: TRANSPLANT | Facility: HOSPITAL | Age: 69
End: 2024-02-29
Payer: COMMERCIAL

## 2024-02-29 DIAGNOSIS — Z94.0 KIDNEY TRANSPLANTED (HHS-HCC): ICD-10-CM

## 2024-02-29 DIAGNOSIS — N18.6 ESRD (END STAGE RENAL DISEASE) (MULTI): Primary | ICD-10-CM

## 2024-02-29 NOTE — RESULT ENCOUNTER NOTE
Left voicemail informing patient of shave biopsy results of the left zygomatic area: Sebaceous Proliferation. Per Dr. Jc the biopsy demonstrated enlargement of oil glands, not any signs of infection and are benign skin growths. Patient was advised to schedule a follow up visit to discuss cosmetic removal of lesions if they so choose. Office number was left on message if patient had any further questions or concerns.

## 2024-03-02 DIAGNOSIS — Z94.0 KIDNEY REPLACED BY TRANSPLANT (HHS-HCC): ICD-10-CM

## 2024-03-02 PROCEDURE — RXMED WILLOW AMBULATORY MEDICATION CHARGE

## 2024-03-04 PROBLEM — N18.6 ESRD (END STAGE RENAL DISEASE) (MULTI): Status: ACTIVE | Noted: 2024-02-29

## 2024-03-04 RX ORDER — PRAVASTATIN SODIUM 10 MG/1
10 TABLET ORAL NIGHTLY
Qty: 90 TABLET | Refills: 0 | OUTPATIENT
Start: 2024-03-04 | End: 2024-06-02

## 2024-03-05 ENCOUNTER — APPOINTMENT (OUTPATIENT)
Dept: UROLOGY | Facility: CLINIC | Age: 69
End: 2024-03-05
Payer: COMMERCIAL

## 2024-03-05 ENCOUNTER — PHARMACY VISIT (OUTPATIENT)
Dept: PHARMACY | Facility: CLINIC | Age: 69
End: 2024-03-05
Payer: MEDICAID

## 2024-03-05 ENCOUNTER — LAB (OUTPATIENT)
Dept: LAB | Facility: LAB | Age: 69
End: 2024-03-05
Payer: COMMERCIAL

## 2024-03-05 DIAGNOSIS — D47.2 MGUS (MONOCLONAL GAMMOPATHY OF UNKNOWN SIGNIFICANCE): ICD-10-CM

## 2024-03-05 DIAGNOSIS — Z94.0 KIDNEY REPLACED BY TRANSPLANT (HHS-HCC): ICD-10-CM

## 2024-03-05 LAB
ALBUMIN SERPL BCP-MCNC: 3.1 G/DL (ref 3.4–5)
ALBUMIN SERPL BCP-MCNC: 3.2 G/DL (ref 3.4–5)
ALP SERPL-CCNC: 38 U/L (ref 33–136)
ALT SERPL W P-5'-P-CCNC: 17 U/L (ref 10–52)
ANION GAP SERPL CALC-SCNC: 10 MMOL/L (ref 10–20)
ANION GAP SERPL CALC-SCNC: 12 MMOL/L (ref 10–20)
AST SERPL W P-5'-P-CCNC: 18 U/L (ref 9–39)
BASOPHILS # BLD AUTO: 0.01 X10*3/UL (ref 0–0.1)
BASOPHILS # BLD MANUAL: 0.02 X10*3/UL (ref 0–0.1)
BASOPHILS NFR BLD AUTO: 0.4 %
BASOPHILS NFR BLD MANUAL: 0.9 %
BILIRUB SERPL-MCNC: 0.3 MG/DL (ref 0–1.2)
BUN SERPL-MCNC: 66 MG/DL (ref 6–23)
BUN SERPL-MCNC: 66 MG/DL (ref 6–23)
CALCIUM SERPL-MCNC: 9.8 MG/DL (ref 8.6–10.6)
CALCIUM SERPL-MCNC: 9.9 MG/DL (ref 8.6–10.6)
CHLORIDE SERPL-SCNC: 109 MMOL/L (ref 98–107)
CHLORIDE SERPL-SCNC: 110 MMOL/L (ref 98–107)
CO2 SERPL-SCNC: 28 MMOL/L (ref 21–32)
CO2 SERPL-SCNC: 28 MMOL/L (ref 21–32)
CREAT SERPL-MCNC: 5.4 MG/DL (ref 0.5–1.3)
CREAT SERPL-MCNC: 5.69 MG/DL (ref 0.5–1.3)
EGFRCR SERPLBLD CKD-EPI 2021: 10 ML/MIN/1.73M*2
EGFRCR SERPLBLD CKD-EPI 2021: 11 ML/MIN/1.73M*2
EOSINOPHIL # BLD AUTO: 0.09 X10*3/UL (ref 0–0.7)
EOSINOPHIL # BLD MANUAL: 0.1 X10*3/UL (ref 0–0.7)
EOSINOPHIL NFR BLD AUTO: 3.6 %
EOSINOPHIL NFR BLD MANUAL: 4.2 %
ERYTHROCYTE [DISTWIDTH] IN BLOOD BY AUTOMATED COUNT: 15.6 % (ref 11.5–14.5)
ERYTHROCYTE [DISTWIDTH] IN BLOOD BY AUTOMATED COUNT: 15.7 % (ref 11.5–14.5)
GLUCOSE SERPL-MCNC: 127 MG/DL (ref 74–99)
GLUCOSE SERPL-MCNC: 130 MG/DL (ref 74–99)
HCT VFR BLD AUTO: 24.6 % (ref 41–52)
HCT VFR BLD AUTO: 26 % (ref 41–52)
HGB BLD-MCNC: 7.9 G/DL (ref 13.5–17.5)
HGB BLD-MCNC: 8.1 G/DL (ref 13.5–17.5)
IGA SERPL-MCNC: 564 MG/DL (ref 70–400)
IGG SERPL-MCNC: 840 MG/DL (ref 700–1600)
IGM SERPL-MCNC: 18 MG/DL (ref 40–230)
IMM GRANULOCYTES # BLD AUTO: 0.08 X10*3/UL (ref 0–0.7)
IMM GRANULOCYTES # BLD AUTO: 0.14 X10*3/UL (ref 0–0.7)
IMM GRANULOCYTES NFR BLD AUTO: 3.2 % (ref 0–0.9)
IMM GRANULOCYTES NFR BLD AUTO: 5.8 % (ref 0–0.9)
LYMPHOCYTES # BLD AUTO: 0.67 X10*3/UL (ref 1.2–4.8)
LYMPHOCYTES # BLD MANUAL: 0.66 X10*3/UL (ref 1.2–4.8)
LYMPHOCYTES NFR BLD AUTO: 26.8 %
LYMPHOCYTES NFR BLD MANUAL: 27.7 %
MCH RBC QN AUTO: 29 PG (ref 26–34)
MCH RBC QN AUTO: 29.2 PG (ref 26–34)
MCHC RBC AUTO-ENTMCNC: 31.2 G/DL (ref 32–36)
MCHC RBC AUTO-ENTMCNC: 32.1 G/DL (ref 32–36)
MCV RBC AUTO: 91 FL (ref 80–100)
MCV RBC AUTO: 93 FL (ref 80–100)
METAMYELOCYTES # BLD MANUAL: 0.02 X10*3/UL
METAMYELOCYTES NFR BLD MANUAL: 0.8 %
MONOCYTES # BLD AUTO: 0.37 X10*3/UL (ref 0.1–1)
MONOCYTES # BLD MANUAL: 0.14 X10*3/UL (ref 0.1–1)
MONOCYTES NFR BLD AUTO: 14.8 %
MONOCYTES NFR BLD MANUAL: 5.9 %
MYELOCYTES # BLD MANUAL: 0.02 X10*3/UL
MYELOCYTES NFR BLD MANUAL: 0.8 %
NEUTROPHILS # BLD AUTO: 1.28 X10*3/UL (ref 1.2–7.7)
NEUTROPHILS NFR BLD AUTO: 51.2 %
NEUTS SEG # BLD MANUAL: 1.41 X10*3/UL (ref 1.2–7)
NEUTS SEG NFR BLD MANUAL: 58.8 %
NRBC BLD-RTO: 0 /100 WBCS (ref 0–0)
NRBC BLD-RTO: 0 /100 WBCS (ref 0–0)
OVALOCYTES BLD QL SMEAR: ABNORMAL
PHOSPHATE SERPL-MCNC: 2.8 MG/DL (ref 2.5–4.9)
PHOSPHATE SERPL-MCNC: 2.8 MG/DL (ref 2.5–4.9)
PLATELET # BLD AUTO: 108 X10*3/UL (ref 150–450)
PLATELET # BLD AUTO: 115 X10*3/UL (ref 150–450)
POTASSIUM SERPL-SCNC: 4.8 MMOL/L (ref 3.5–5.3)
POTASSIUM SERPL-SCNC: 4.8 MMOL/L (ref 3.5–5.3)
PROMYELOCYTES # BLD MANUAL: 0.02 X10*3/UL
PROMYELOCYTES NFR BLD MANUAL: 0.9 %
PROT SERPL-MCNC: 5.5 G/DL (ref 6.4–8.2)
PROT SERPL-MCNC: 5.5 G/DL (ref 6.4–8.2)
RBC # BLD AUTO: 2.71 X10*6/UL (ref 4.5–5.9)
RBC # BLD AUTO: 2.79 X10*6/UL (ref 4.5–5.9)
RBC MORPH BLD: ABNORMAL
SCHISTOCYTES BLD QL SMEAR: ABNORMAL
SODIUM SERPL-SCNC: 142 MMOL/L (ref 136–145)
SODIUM SERPL-SCNC: 145 MMOL/L (ref 136–145)
TACROLIMUS BLD-MCNC: 2.8 NG/ML
TACROLIMUS BLD-MCNC: 3 NG/ML
TOTAL CELLS COUNTED BLD: 119
WBC # BLD AUTO: 2.4 X10*3/UL (ref 4.4–11.3)
WBC # BLD AUTO: 2.5 X10*3/UL (ref 4.4–11.3)

## 2024-03-05 PROCEDURE — 82784 ASSAY IGA/IGD/IGG/IGM EACH: CPT

## 2024-03-05 PROCEDURE — 84155 ASSAY OF PROTEIN SERUM: CPT

## 2024-03-05 PROCEDURE — 84100 ASSAY OF PHOSPHORUS: CPT

## 2024-03-05 PROCEDURE — 36415 COLL VENOUS BLD VENIPUNCTURE: CPT

## 2024-03-05 PROCEDURE — RXMED WILLOW AMBULATORY MEDICATION CHARGE

## 2024-03-05 PROCEDURE — 80197 ASSAY OF TACROLIMUS: CPT

## 2024-03-05 PROCEDURE — 80069 RENAL FUNCTION PANEL: CPT

## 2024-03-05 PROCEDURE — 80053 COMPREHEN METABOLIC PANEL: CPT

## 2024-03-05 PROCEDURE — 83521 IG LIGHT CHAINS FREE EACH: CPT

## 2024-03-05 PROCEDURE — 84165 PROTEIN E-PHORESIS SERUM: CPT

## 2024-03-05 PROCEDURE — 85007 BL SMEAR W/DIFF WBC COUNT: CPT

## 2024-03-05 PROCEDURE — 85025 COMPLETE CBC W/AUTO DIFF WBC: CPT

## 2024-03-05 PROCEDURE — 85027 COMPLETE CBC AUTOMATED: CPT

## 2024-03-05 PROCEDURE — 86334 IMMUNOFIX E-PHORESIS SERUM: CPT

## 2024-03-05 PROCEDURE — 86320 SERUM IMMUNOELECTROPHORESIS: CPT

## 2024-03-06 LAB
ALBUMIN: 3.2 G/DL (ref 3.4–5)
ALPHA 1 GLOBULIN: 0.2 G/DL (ref 0.2–0.6)
ALPHA 2 GLOBULIN: 0.5 G/DL (ref 0.4–1.1)
BETA GLOBULIN: 1 G/DL (ref 0.5–1.2)
GAMMA GLOBULIN: 0.7 G/DL (ref 0.5–1.4)
IMMUNOFIXATION COMMENT: ABNORMAL
KAPPA LC SERPL-MCNC: 10.85 MG/DL (ref 0.33–1.94)
KAPPA LC/LAMBDA SER: 1.16 {RATIO} (ref 0.26–1.65)
LAMBDA LC SERPL-MCNC: 9.39 MG/DL (ref 0.57–2.63)
M-PROTEIN 1: 0.2 G/DL
M-PROTEIN 2: 0.1 G/DL
M-PROTEIN 3: 0.1 G/DL
PATH REVIEW - SERUM IMMUNOFIXATION: ABNORMAL
PATH REVIEW-SERUM PROTEIN ELECTROPHORESIS: ABNORMAL
PROTEIN ELECTROPHORESIS COMMENT: ABNORMAL

## 2024-03-07 ENCOUNTER — TREATMENT (OUTPATIENT)
Dept: PHYSICAL THERAPY | Facility: CLINIC | Age: 69
End: 2024-03-07
Payer: COMMERCIAL

## 2024-03-07 ENCOUNTER — PRE-ADMISSION TESTING (OUTPATIENT)
Dept: PREADMISSION TESTING | Facility: HOSPITAL | Age: 69
End: 2024-03-07
Payer: COMMERCIAL

## 2024-03-07 ENCOUNTER — PHARMACY VISIT (OUTPATIENT)
Dept: PHARMACY | Facility: CLINIC | Age: 69
End: 2024-03-07
Payer: MEDICAID

## 2024-03-07 VITALS
BODY MASS INDEX: 26.43 KG/M2 | WEIGHT: 174.38 LBS | TEMPERATURE: 97.9 F | OXYGEN SATURATION: 96 % | DIASTOLIC BLOOD PRESSURE: 53 MMHG | HEART RATE: 70 BPM | RESPIRATION RATE: 16 BRPM | SYSTOLIC BLOOD PRESSURE: 154 MMHG | HEIGHT: 68 IN

## 2024-03-07 DIAGNOSIS — G93.2 INTRACRANIAL HYPERTENSION: ICD-10-CM

## 2024-03-07 DIAGNOSIS — M54.10 RADICULITIS: ICD-10-CM

## 2024-03-07 DIAGNOSIS — N18.6 ESRD (END STAGE RENAL DISEASE) (MULTI): Primary | ICD-10-CM

## 2024-03-07 LAB
ABO GROUP (TYPE) IN BLOOD: NORMAL
ANION GAP SERPL CALC-SCNC: 11 MMOL/L (ref 10–20)
ANTIBODY SCREEN: NORMAL
BUN SERPL-MCNC: 60 MG/DL (ref 6–23)
CALCIUM SERPL-MCNC: 9.8 MG/DL (ref 8.6–10.6)
CHLORIDE SERPL-SCNC: 109 MMOL/L (ref 98–107)
CO2 SERPL-SCNC: 28 MMOL/L (ref 21–32)
CREAT SERPL-MCNC: 5.23 MG/DL (ref 0.5–1.3)
EGFRCR SERPLBLD CKD-EPI 2021: 11 ML/MIN/1.73M*2
ERYTHROCYTE [DISTWIDTH] IN BLOOD BY AUTOMATED COUNT: 15.5 % (ref 11.5–14.5)
GLUCOSE SERPL-MCNC: 141 MG/DL (ref 74–99)
HCT VFR BLD AUTO: 26.5 % (ref 41–52)
HGB BLD-MCNC: 8.3 G/DL (ref 13.5–17.5)
MCH RBC QN AUTO: 29 PG (ref 26–34)
MCHC RBC AUTO-ENTMCNC: 31.3 G/DL (ref 32–36)
MCV RBC AUTO: 93 FL (ref 80–100)
NRBC BLD-RTO: 0 /100 WBCS (ref 0–0)
PLATELET # BLD AUTO: 114 X10*3/UL (ref 150–450)
POTASSIUM SERPL-SCNC: 5 MMOL/L (ref 3.5–5.3)
RBC # BLD AUTO: 2.86 X10*6/UL (ref 4.5–5.9)
RH FACTOR (ANTIGEN D): NORMAL
SODIUM SERPL-SCNC: 143 MMOL/L (ref 136–145)
WBC # BLD AUTO: 2.3 X10*3/UL (ref 4.4–11.3)

## 2024-03-07 PROCEDURE — 97110 THERAPEUTIC EXERCISES: CPT | Mod: GP

## 2024-03-07 PROCEDURE — 86900 BLOOD TYPING SEROLOGIC ABO: CPT

## 2024-03-07 PROCEDURE — 36415 COLL VENOUS BLD VENIPUNCTURE: CPT

## 2024-03-07 PROCEDURE — 99205 OFFICE O/P NEW HI 60 MIN: CPT | Performed by: NURSE PRACTITIONER

## 2024-03-07 PROCEDURE — 87081 CULTURE SCREEN ONLY: CPT

## 2024-03-07 PROCEDURE — 85027 COMPLETE CBC AUTOMATED: CPT

## 2024-03-07 PROCEDURE — 80048 BASIC METABOLIC PNL TOTAL CA: CPT

## 2024-03-07 RX ORDER — PREDNISONE 5 MG/1
5 TABLET ORAL DAILY
Status: ON HOLD | COMMUNITY
End: 2024-05-12 | Stop reason: ENTERED-IN-ERROR

## 2024-03-07 RX ORDER — CHLORHEXIDINE GLUCONATE 40 MG/ML
SOLUTION TOPICAL DAILY PRN
Qty: 473 ML | Refills: 0 | Status: SHIPPED | OUTPATIENT
Start: 2024-03-07 | End: 2024-03-16 | Stop reason: HOSPADM

## 2024-03-07 ASSESSMENT — DUKE ACTIVITY SCORE INDEX (DASI)
CAN YOU DO HEAVY WORK AROUND THE HOUSE LIKE SCRUBBING FLOORS OR LIFTING AND MOVING HEAVY FURNITURE: NO
CAN YOU DO LIGHT WORK AROUND THE HOUSE LIKE DUSTING OR WASHING DISHES: YES
TOTAL_SCORE: 16.2
CAN YOU RUN A SHORT DISTANCE: NO
CAN YOU TAKE CARE OF YOURSELF (EAT, DRESS, BATHE, OR USE TOILET): YES
CAN YOU DO MODERATE WORK AROUND THE HOUSE LIKE VACUUMING, SWEEPING FLOORS OR CARRYING GROCERIES: YES
DASI METS SCORE: 4.7
CAN YOU CLIMB A FLIGHT OF STAIRS OR WALK UP A HILL: YES
CAN YOU PARTICIPATE IN STRENOUS SPORTS LIKE SWIMMING, SINGLES TENNIS, FOOTBALL, BASKETBALL, OR SKIING: NO
CAN YOU DO YARD WORK LIKE RAKING LEAVES, WEEDING OR PUSHING A MOWER: NO
CAN YOU WALK INDOORS, SUCH AS AROUND YOUR HOUSE: YES
CAN YOU PARTICIPATE IN MODERATE RECREATIONAL ACTIVITIES LIKE GOLF, BOWLING, DANCING, DOUBLES TENNIS OR THROWING A BASEBALL OR FOOTBALL: NO
CAN YOU HAVE SEXUAL RELATIONS: NO
CAN YOU WALK A BLOCK OR TWO ON LEVEL GROUND: NO

## 2024-03-07 ASSESSMENT — ENCOUNTER SYMPTOMS
NECK NEGATIVE: 1
RESPIRATORY NEGATIVE: 1
CARDIOVASCULAR NEGATIVE: 1
ENDOCRINE NEGATIVE: 1
NUMBNESS: 1
EYES NEGATIVE: 1
CONSTITUTIONAL NEGATIVE: 1
ARTHRALGIAS: 1
GASTROINTESTINAL NEGATIVE: 1

## 2024-03-07 ASSESSMENT — LIFESTYLE VARIABLES: SMOKING_STATUS: NONSMOKER

## 2024-03-07 ASSESSMENT — CHADS2 SCORE
CHADS2 SCORE: 2
CHF: NO
HYPERTENSION: YES
DIABETES: YES
AGE GREATER THAN OR EQUAL TO 75: NO
PRIOR STROKE OR TIA OR THROMBOEMBOLISM: NO
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
## 2024-03-07 NOTE — PREPROCEDURE INSTRUCTIONS
NPO Instructions:    Do not eat any food after midnight the night before your surgery/procedure.  You may have clear liquids until TWO hours before surgery/procedure. This includes water, black tea/coffee, (no milk or cream) apple juice and electrolyte drinks (Gatorade).  You may chew gum up to TWO hours before your surgery/procedure.    Additional Instructions:     Seven/Six Days before Surgery:  We have sent a prescription for Hibiclens soap to your preferred pharmacy.  If you have not already, Please  your prescription and start using five days before surgery.  Follow the instruction sheet provided to you at your CPM/PAT appointment.  Review your medication instructions, stop indicated medications  Five Days before Surgery:  Review your medication instructions, stop indicated medications  Begin using your Hibiclens  Three Days before Surgery:  Review your medication instructions, stop indicated medications  The Day before Surgery:  Review your medication instructions, stop indicated medications  You will be contacted regarding the time of your arrival to facility and surgery time  Do not eat any food after Midnight  Day of Surgery:  Review your medication instructions, take indicated medications  If you have diabetes, please check your fasting blood sugar upon awakening.  If fasting blood sugar is <80 mg/dl, drink 100 ml of apple juice, time limit of 2 hours before  You may chew gum up to TWO hours before your surgery/procedure  Wear  comfortable loose fitting clothing  Do not use moisturizers, creams, lotions or perfume  All jewelry and valuables should be left at home

## 2024-03-07 NOTE — CPM/PAT H&P
CPM/PAT Evaluation       Name: Manolo Bashir (Manolo Bashir)  /Age: 1955/68 y.o.     Visit Type:   In-Person       Chief Complaint: ESRD (end stage renal disease)    HPI  Pt is a 68 year old male with a PMHx significant for HTN, HLD, IgG and IbA MGUS, Cataracts, DM Type II, cataracts, prostate cancer s/p prostatectomy, and s/p renal transplant x2.  Pt was recently hospitalized and an uptrend in the creatinine and tacrolimus levels was noticed, the need for dialysis was determined.  Pt is being evaluated in [facility] in anticipation of a Left Upper Extremity A-V Graft with Dr. South on 3-21-24.  Past Medical History:   Diagnosis Date    Anemia     Arthritis     Cataract     Chronic kidney disease, stage 3 unspecified (CMS/HCC) 2018    Stage 3 chronic kidney disease    CKD (chronic kidney disease)     COVID-19 2020    COVID-19 virus infection    Diabetes (CMS/HCC)     ESRD (end stage renal disease) (CMS/HCC)     Focal and segmental proliferative glomerulonephritis 2023    HTN (hypertension)     Hyperlipidemia     Other long term (current) drug therapy 2021    High risk medication use    Personal history of other diseases of the circulatory system     Personal history of cardiac murmur    Personal history of other infectious and parasitic diseases 2015    History of hepatitis    Polyp, colonic 2023    Primary osteoarthritis of both ankles 2023    Prostate cancer (CMS/HCC)     Tubular adenoma of colon 2023    Unspecified kidney failure 2016    Renal failure       Past Surgical History:   Procedure Laterality Date    ILEOSTOMY  2017    Ileostomy    ILEOSTOMY CLOSURE  2015    Ileostomy Closure    OTHER SURGICAL HISTORY  2017    Right Hemicolectomy    OTHER SURGICAL HISTORY  2015    Arteriovenous Surgery Creation Of A-V Fistula    OTHER SURGICAL HISTORY  2015    Sigmoidoscopy (Fiberoptic, Therapeutic )    PROSTATECTOMY  10/11/2013     Prostatectomy Radical    TRANSPLANT, KIDNEY, OPEN  1992    TRANSPLANT, KIDNEY, OPEN  2013    US GUIDED PERCUTANEOUS BIOPSY RENAL LEFT Left 11/20/2023    US GUIDED PERCUTANEOUS BIOPSY RENAL LEFT 11/20/2023 Lou Rodgers MD [facility]    US GUIDED PERCUTANEOUS PERITONEAL OR RETROPERITONEAL FLUID COLLECTION DRAINAGE  10/20/2022    US GUIDED PERCUTANEOUS PERITONEAL OR RETROPERITONEAL FLUID COLLECTION DRAINAGE 10/20/2022 [facility]       Patient Sexual activity questions deferred to the physician.    Family History   Problem Relation Name Age of Onset    Bone cancer Mother      Other (corona's sarcome of the bone marrow) Mother      Prostate cancer Father      Diabetes Other Family Hist     Hypertension Other Family Hist        No Known Allergies    Prior to Admission medications    Medication Sig Start Date End Date Taking? Authorizing Provider   acetaminophen (Tylenol) 325 mg tablet Take 3 tablets (975 mg) by mouth every 6 hours if needed (pain). 2/20/24 2/19/25 Yes Raad Rolle MD   aspirin 81 mg chewable tablet Chew 1 tablet (81 mg) once daily. 1/9/24 1/8/25 Yes Sumeet Bacon MD   azaTHIOprine (Imuran) 50 mg tablet Take 0.5 tablets (25 mg) by mouth once daily. 2/20/24 2/19/25 Yes Raad Rolle MD   calcitriol (Rocaltrol) 0.25 mcg capsule Take 1 capsule (0.25 mcg) by mouth once daily. Do not start before December 22, 2023. 12/22/23 3/26/24 Yes Chavez Goins MD   carvedilol (Coreg) 12.5 mg tablet Take 3 tablets (37.5 mg) by mouth 2 times a day. 1/21/24 3/21/24 Yes Sunitha Collado MD   cholecalciferol (Vitamin D-3) 25 MCG (1000 UT) tablet Take 1 tablet (1,000 Units) by mouth once daily. Do not start before December 22, 2023. 12/22/23 4/4/24 Yes Chavez Goins MD   cinacalcet (Sensipar) 30 mg tablet Take 1 tablet (30 mg) by mouth once daily. 2/7/24 10/7/28 Yes Kaycee Arroyo MD   diclofenac sodium (Voltaren) 1 % gel gel Apply 1 Application (4 grams)  topically 4 times a day as  needed (back pain). 1/21/24  Yes Sunitha Collado MD   hydrALAZINE (Apresoline) 100 mg tablet Take 1 tablet (100 mg) by mouth 3 times a day.  Patient taking differently: Take 1 tablet (100 mg) by mouth 2 times a day. 12/21/23 3/20/24 Yes Chavez Goins MD   insulin glargine (Lantus U-100 Insulin) 100 unit/mL injection Inject 24 Units under the skin once every 24 hours. Take as directed per insulin instructions.  Yoel   Yes Historical Provider, MD   insulin lispro (HumaLOG) 100 unit/mL injection Inject under the skin 3 times a day with meals. 100-199 2 units 200-299 4 units 300-399 6 units   Yes Historical Provider, MD   isosorbide mononitrate ER (Imdur) 60 mg 24 hr tablet Take 1 tablet (60 mg) by mouth once daily. Do not crush or chew. Do not start before January 22, 2024. 1/22/24 3/25/24 Yes Sunitha Collado MD   lancets (Unilet Super Thin Lancets) 30 gauge misc USE TO TEST BLOOD SUGAR THREE TIMES A DAY 11/22/23  Yes Estella Quinonez MD   multivitamin tablet Take 1 tablet by mouth once daily.   Yes Historical Provider, MD   NIFEdipine ER (Adalat CC) 60 mg 24 hr tablet Take 1 tablet (60 mg) by mouth 2 times a day. Do not crush, chew, or split. 1/21/24 3/26/24 Yes Sunitha Collado MD   ondansetron (Zofran) 4 mg tablet Take 1 tablet (4 mg) by mouth every 8 hours if needed for nausea or vomiting.   Yes Historical Provider, MD   pantoprazole (ProtoNix) 40 mg EC tablet Take 1 tablet (40 mg) by mouth once daily in the morning. Take before meals. Do not crush, chew, or split. Do not start before January 22, 2024. 1/22/24 3/23/24 Yes Sunitha Collado MD   pravastatin (Pravachol) 40 mg tablet TAKE 1 TABLET BY MOUTH AT BEDTIME 2/20/24  Yes Raad Rolle MD   predniSONE (Deltasone) 5 mg tablet Take 1 tablet (5 mg) by mouth once daily.   Yes Historical Provider, MD   sodium zirconium cyclosilicate (Lokelma) 5 gram packet Take 5 g by mouth once daily. 2/3/24 2/2/25 Yes Kaycee Arroyo MD  "  sulfamethoxazole-trimethoprim (Bactrim) 400-80 mg tablet Take 1 tablet by mouth 2 times a day. 12/21/23 6/18/24 Yes Chavez Goins MD   tacrolimus (Prograf) 0.5 mg capsule Take 1.5 mg by mouth every 12 hours. 2/7/24 2/6/25 Yes Kaycee Arroyo MD   torsemide (Demadex) 20 mg tablet Take 2 tablets (40 mg) by mouth 2 times a day with meals.  Patient taking differently: Take 2 tablets (40 mg) by mouth once daily in the morning. 1/21/24  Yes Sunitha Collado MD   Unilet Super Thin Lancets 30 gauge misc 1 each 3 times a day. 11/29/23  Yes Estella Quinonez MD   docusate sodium (Colace) 100 mg capsule Take by mouth once daily as needed. 6/6/14 9/24/24  Historical Provider, MD   Easy Touch Alcohol Prep Pads pads, medicated  1/24/24   Historical Provider, MD   pen needle, diabetic (TechLITE Pen Needle) 32 gauge x 5/32\" needle 1 each 3 times a day. 11/29/23   Estella Quinonez MD   pravastatin (Pravachol) 10 mg tablet Take 1 tablet (10 mg) by mouth once daily at bedtime. 11/22/23 2/20/24  Edward Herman MD   amLODIPine (Norvasc) 10 mg tablet TAKE 1 TABLET BY MOUTH DAILY FOR BLOOD PRESSURE *EMERGENCY REFILL* 2/20/24 3/7/24  Raad Rolle MD        PAT ROS:   Constitutional:   neg    Neuro/Psych:    numbness (fingertips)  Eyes:   neg    Ears:   neg    Nose:   neg    Mouth:   neg    Throat:   neg    Neck:   neg    Cardio:   neg    Respiratory:   neg    Endocrine:   neg    GI:   neg    :   neg    Musculoskeletal:    arthralgias  Hematologic:   neg    Skin:  neg        Physical Exam  Vitals reviewed.   Constitutional:       Appearance: Normal appearance.   HENT:      Head: Normocephalic and atraumatic.      Nose: Nose normal.      Mouth/Throat:      Mouth: Mucous membranes are moist.   Eyes:      Pupils: Pupils are equal, round, and reactive to light.   Cardiovascular:      Rate and Rhythm: Normal rate and regular rhythm.      Pulses: Normal pulses.      Heart sounds: Normal heart sounds.   Pulmonary:    "   Effort: Pulmonary effort is normal.      Breath sounds: Normal breath sounds.   Abdominal:      Palpations: Abdomen is soft.   Musculoskeletal:      Cervical back: Normal range of motion.   Skin:     General: Skin is warm.   Neurological:      General: No focal deficit present.      Mental Status: He is alert and oriented to person, place, and time.   Psychiatric:         Mood and Affect: Mood normal.         Behavior: Behavior normal.          PAT AIRWAY:   Airway:     Mallampati::  III    TM distance::  >3 FB    Neck ROM::  Full  normal        Visit Vitals  /53   Pulse 70   Temp 36.6 °C (97.9 °F) (Oral)   Resp 16       DASI Risk Score      Flowsheet Row Most Recent Value   DASI SCORE 16.2   METS Score (Will be calculated only when all the questions are answered) 4.7          Caprini DVT Assessment      Flowsheet Row Most Recent Value   DVT Score 11   Current Status Major surgery planned, lasting over 3 hours   History Prior major surgery, Previous malignancy   Age 60-75 years   BMI 30 or less          Modified Frailty Index      Flowsheet Row Most Recent Value   Modified Frailty Index Calculator .1818          CHADS2 Stroke Risk  Current as of 21 minutes ago        N/A 3 - 100%: High Risk   2 - 3%: Medium Risk   0 - 2%: Low Risk     Last Change: N/A          This score determines the patient's risk of having a stroke if the patient has atrial fibrillation.        This score is not applicable to this patient. Components are not calculated.          Revised Cardiac Risk Index      Flowsheet Row Most Recent Value   Revised Cardiac Risk Calculator 0          Apfel Simplified Score      Flowsheet Row Most Recent Value   Apfel Simplified Score Calculator 1          Risk Analysis Index Results This Encounter    No data found in the last 1 encounters.       Stop Bang Score      Flowsheet Row Most Recent Value   Do you snore loudly? 1   Do you often feel tired or fatigued after your sleep? 0   Has anyone ever  observed you stop breathing in your sleep? 0   Do you have or are you being treated for high blood pressure? 1   Recent BMI (Calculated) 26   Is BMI greater than 35 kg/m2? 0=No   Age older than 50 years old? 1=Yes   Is your neck circumference greater than 17 inches (Male) or 16 inches (Female)? 0   Gender - Male 1=Yes   STOP-BANG Total Score 4            Assessment and Plan:     Anesthesia:  The patient denies problems with anesthesia in the past such as PONV, prolonged sedation, awareness, dental damage, aspiration, cardiac arrest, difficult intubation, or unexpected hospital admissions.     Neuro:   The patient has no neurological diagnoses or significant findings on chart review, clinical presentation, and evaluation.  No grossly apparent perioperative risk. The patient is at increased risk for perioperative stroke secondary to chronic renal failure, hypertension , increased age, hyperlipidemia, diabetes mellitus, general anesthesia, operative time >2.5 hours.    HEENT/Airway  No diagnoses, significant findings on chart review, clinical presentation, or evaluation.    Cardiovascular  The patient is scheduled for non-cardiac surgery associated with elevated risk.  The patient has no major cardiac contraindications to non- cardiac surgery.  RCRI  The patient meets 2 RCRI criteria and therefore has a 6.6% risk (elevated) of major adverse cardiac complications.  METS  The patient's functional capacity capacity is greater than 4 METS.  EKG  The patient has no EKG or echocardiographic changes concerning for myocardial ischemia.   Heart Failure  The patient has no known history of heart failure.  Additionally, the patient reports no symptoms of heart failure and demonstrates no signs of heart failure.  Hypertension Evaluation  The patient has a known history of hypertension that is controlled.  Patient's hypertension is most consistent with stage 1.  Heart Rhythm Evaluation  The patient has no history of  arrhythmias.  Heart Valve Evaluation  The patient has no known history of valvular heart disease. The patient has no symptoms or physical exam findings to suggest valvular heart disease.  CARDS EVAL  The patient follows with cardiology, Dr. Bacon. Patient was last seen 2-13-24. Per note, Pt to have Cardiac MRI.    The patient has a 30-day risk for MACE of 2 predictors, 10.1% risk for cardiac death, nonfatal myocardial infarction, and nonfatal cardiac arrest.  NATALIE score which indicates a 1.2% risk of intraoperative or 30-day postoperative.    Pulmonary   No significant findings on chart review or clinical presentation and evaluation. The patient is at increased risk of perioperative pulmonary complications secondary to advanced age greater than 60, preoperative anemia, hemoglobin less than 10, elevated BUN >30.  The patient has a stop bang score of 4, which places patient at intermediate risk for having DEISY.    ARISCAT 45, High, 42.1% risk of in-hospital postoperative pulmonary complications  PRODIGY 16, high risk of respiratory depression episode. Patient given PI sheet for preoperative deep breathing exercises.    Hematology  The patient has diagnoses or significant findings on chart review or clinical presentation and evaluation significant for MGUS.  Antiplatelet management   The patient is currently receiving antiplatelet therapy for primary prevention of cardiovascular disease.  Anticoagulation management  The patient is not currently receiving anticoagulation therapy.    Caprini score 11, high risk of perioperative VTE.   Patient instructed to ambulate as soon as possible postoperatively to decrease thromboembolic risk. Initiate mechanical DVT prophylaxis as soon as possible and initiate chemical prophylaxis when deemed safe from a bleeding standpoint post surgery.     Transfusion Evaluation  A type and screen was obtained given the likelihood for perioperative transfusion of blood or blood  products.    Gastrointestinal  The patient has diagnoses or significant findings on chart review or clinical presentation and evaluation significant for colon polyps.  Eat 10- 0,  self-perceived oropharyngeal dysphagia scale (0-40)     Genitourinary  No diagnoses or significant findings on chart review or clinical presentation and evaluation.    Renal  The patient has a history of chronic kidney disease most consistent with ESRD  Patient's renal function appears unchanged when compared to prior labs. The patient has specific risk factors associated with increased risk of perioperative renal complications due to age greater than 55, male gender, hypertension, diabetes mellitus..     Musculoskeletal  The patient has diagnoses or significant findings on chart review or clinical presentation and evaluation significant for Osteoarthritis    Endocrine  Diabetes Evaluation  The patient has history of diabetes mellitus controlled by medications  Thyroid Disease Evaluation  The patient has no history of thyroid disease.    ID  No diagnoses or significant findings on chart review or clinical presentation and evaluation.    -Preoperative medication instructions were provided and reviewed with the patient.  Any additional testing or evaluation was explained to the patient.  NPO Instructions were discussed, and the patient's questions were answered prior to conclusion of this encounter.     Recent Results (from the past 168 hour(s))   Tacrolimus    Collection Time: 03/05/24  9:32 AM   Result Value Ref Range    Tacrolimus  2.8 <=15.0 ng/mL   Renal Function Panel    Collection Time: 03/05/24  9:32 AM   Result Value Ref Range    Glucose 130 (H) 74 - 99 mg/dL    Sodium 145 136 - 145 mmol/L    Potassium 4.8 3.5 - 5.3 mmol/L    Chloride 110 (H) 98 - 107 mmol/L    Bicarbonate 28 21 - 32 mmol/L    Anion Gap 12 10 - 20 mmol/L    Urea Nitrogen 66 (H) 6 - 23 mg/dL    Creatinine 5.40 (H) 0.50 - 1.30 mg/dL    eGFR 11 (L) >60 mL/min/1.73m*2     Calcium 9.8 8.6 - 10.6 mg/dL    Phosphorus 2.8 2.5 - 4.9 mg/dL    Albumin 3.1 (L) 3.4 - 5.0 g/dL   CBC and Auto Differential    Collection Time: 03/05/24  9:32 AM   Result Value Ref Range    WBC 2.5 (L) 4.4 - 11.3 x10*3/uL    nRBC 0.0 0.0 - 0.0 /100 WBCs    RBC 2.79 (L) 4.50 - 5.90 x10*6/uL    Hemoglobin 8.1 (L) 13.5 - 17.5 g/dL    Hematocrit 26.0 (L) 41.0 - 52.0 %    MCV 93 80 - 100 fL    MCH 29.0 26.0 - 34.0 pg    MCHC 31.2 (L) 32.0 - 36.0 g/dL    RDW 15.6 (H) 11.5 - 14.5 %    Platelets 108 (L) 150 - 450 x10*3/uL    Neutrophils % 51.2 40.0 - 80.0 %    Immature Granulocytes %, Automated 3.2 (H) 0.0 - 0.9 %    Lymphocytes % 26.8 13.0 - 44.0 %    Monocytes % 14.8 2.0 - 10.0 %    Eosinophils % 3.6 0.0 - 6.0 %    Basophils % 0.4 0.0 - 2.0 %    Neutrophils Absolute 1.28 1.20 - 7.70 x10*3/uL    Immature Granulocytes Absolute, Automated 0.08 0.00 - 0.70 x10*3/uL    Lymphocytes Absolute 0.67 (L) 1.20 - 4.80 x10*3/uL    Monocytes Absolute 0.37 0.10 - 1.00 x10*3/uL    Eosinophils Absolute 0.09 0.00 - 0.70 x10*3/uL    Basophils Absolute 0.01 0.00 - 0.10 x10*3/uL   CBC and Auto Differential    Collection Time: 03/05/24  9:32 AM   Result Value Ref Range    WBC 2.4 (L) 4.4 - 11.3 x10*3/uL    nRBC 0.0 0.0 - 0.0 /100 WBCs    RBC 2.71 (L) 4.50 - 5.90 x10*6/uL    Hemoglobin 7.9 (L) 13.5 - 17.5 g/dL    Hematocrit 24.6 (L) 41.0 - 52.0 %    MCV 91 80 - 100 fL    MCH 29.2 26.0 - 34.0 pg    MCHC 32.1 32.0 - 36.0 g/dL    RDW 15.7 (H) 11.5 - 14.5 %    Platelets 115 (L) 150 - 450 x10*3/uL    Immature Granulocytes %, Automated 5.8 (H) 0.0 - 0.9 %    Immature Granulocytes Absolute, Automated 0.14 0.00 - 0.70 x10*3/uL   Comprehensive Metabolic Panel    Collection Time: 03/05/24  9:32 AM   Result Value Ref Range    Glucose 127 (H) 74 - 99 mg/dL    Sodium 142 136 - 145 mmol/L    Potassium 4.8 3.5 - 5.3 mmol/L    Chloride 109 (H) 98 - 107 mmol/L    Bicarbonate 28 21 - 32 mmol/L    Anion Gap 10 10 - 20 mmol/L    Urea Nitrogen 66 (H) 6 - 23  mg/dL    Creatinine 5.69 (H) 0.50 - 1.30 mg/dL    eGFR 10 (L) >60 mL/min/1.73m*2    Calcium 9.9 8.6 - 10.6 mg/dL    Albumin 3.2 (L) 3.4 - 5.0 g/dL    Alkaline Phosphatase 38 33 - 136 U/L    Total Protein 5.5 (L) 6.4 - 8.2 g/dL    AST 18 9 - 39 U/L    Bilirubin, Total 0.3 0.0 - 1.2 mg/dL    ALT 17 10 - 52 U/L   Immunoglobulins (IgG, IgA, IgM)    Collection Time: 03/05/24  9:32 AM   Result Value Ref Range    IgG 840 700 - 1,600 mg/dL    IgA 564 (H) 70 - 400 mg/dL    IgM 18 (L) 40 - 230 mg/dL   [place]/Lambda Free Light Chain, Serum    Collection Time: 03/05/24  9:32 AM   Result Value Ref Range    Ig Kappa Free Light Chain 10.85 (H) 0.33 - 1.94 mg/dL    Ig Lambda Free Light Chain 9.39 (H) 0.57 - 2.63 mg/dL    Kappa/Lambda Ratio 1.16 0.26 - 1.65   Tacrolimus    Collection Time: 03/05/24  9:32 AM   Result Value Ref Range    Tacrolimus  3.0 <=15.0 ng/mL   Protein, Total    Collection Time: 03/05/24  9:32 AM   Result Value Ref Range    Total Protein 5.5 (L) 6.4 - 8.2 g/dL   Serum Protein Electrophoresis + Immunofixation    Collection Time: 03/05/24  9:32 AM   Result Value Ref Range    Albumin 3.2 (L) 3.4 - 5.0 g/dL    Alpha 1 Globulin 0.2 0.2 - 0.6 g/dL    Alpha 2 Globulin 0.5 0.4 - 1.1 g/dL    Beta Globulin 1.0 0.5 - 1.2 g/dL    Gamma 0.7 0.5 - 1.4 g/dL    M-PROTEIN 1 0.2 (H)   g/dL    M-PROTEIN 2 0.1 (H)   g/dL    M-PROTEIN 3 0.1 (H)   g/dL    Protein Electrophoresis Comment       Aberrant band detected. See immunofixation.       Hypoalbuminemia.       Immunofixation Comment       3/4/24 Known monoclonal IgA lambda in the beta region at 0.2 g/dL, monoclonal IgG lambda in the gamma region at 0.1 g/dL, and monoclonal IgG kappa in the gamma region at 0.1 g/dL. Unchanged from the previous analysis on 11/28/23.    Path Review - Serum Protein Electrophoresis        Reviewed and approved by EFRAIN MEZA on 3/6/24 at 9:25 PM.        Path Review - Serum Immunofixation       Reviewed and approved by EFRAIN MEZA on 3/6/24 at  9:26 PM.       Phosphorus    Collection Time: 03/05/24  9:32 AM   Result Value Ref Range    Phosphorus 2.8 2.5 - 4.9 mg/dL   Manual Differential    Collection Time: 03/05/24  9:32 AM   Result Value Ref Range    Neutrophils %, Manual 58.8 40.0 - 80.0 %    Lymphocytes %, Manual 27.7 13.0 - 44.0 %    Monocytes %, Manual 5.9 2.0 - 10.0 %    Eosinophils %, Manual 4.2 0.0 - 6.0 %    Basophils %, Manual 0.9 0.0 - 2.0 %    Metamyelocytes %, Manual 0.8 0.0 - 0.0 %    Myelocytes %, Manual 0.8 0.0 - 0.0 %    Promyelocytes %, Manual 0.9 0.0 - 0.0 %    Seg Neutrophils Absolute, Manual 1.41 1.20 - 7.00 x10*3/uL    Lymphocytes Absolute, Manual 0.66 (L) 1.20 - 4.80 x10*3/uL    Monocytes Absolute, Manual 0.14 0.10 - 1.00 x10*3/uL    Eosinophils Absolute, Manual 0.10 0.00 - 0.70 x10*3/uL    Basophils Absolute, Manual 0.02 0.00 - 0.10 x10*3/uL    Metamyelocytes Absolute, Manual 0.02 0.00 - 0.00 x10*3/uL    Myelocytes Absolute, Manual 0.02 0.00 - 0.00 x10*3/uL    Promyelocytes Absolute, Manual 0.02 0.00 - 0.00 x10*3/uL    Total Cells Counted 119     RBC Morphology See Below     RBC Fragments Few     Ovalocytes Few    Staphylococcus aureus/MRSA colonization, Culture    Collection Time: 03/07/24 12:02 PM    Specimen: Nares/Axilla/Groin; Swab   Result Value Ref Range    Staph/MRSA Screen Culture No Staphylococcus aureus isolated    CBC    Collection Time: 03/07/24 12:02 PM   Result Value Ref Range    WBC 2.3 (L) 4.4 - 11.3 x10*3/uL    nRBC 0.0 0.0 - 0.0 /100 WBCs    RBC 2.86 (L) 4.50 - 5.90 x10*6/uL    Hemoglobin 8.3 (L) 13.5 - 17.5 g/dL    Hematocrit 26.5 (L) 41.0 - 52.0 %    MCV 93 80 - 100 fL    MCH 29.0 26.0 - 34.0 pg    MCHC 31.3 (L) 32.0 - 36.0 g/dL    RDW 15.5 (H) 11.5 - 14.5 %    Platelets 114 (L) 150 - 450 x10*3/uL   Basic Metabolic Panel    Collection Time: 03/07/24 12:02 PM   Result Value Ref Range    Glucose 141 (H) 74 - 99 mg/dL    Sodium 143 136 - 145 mmol/L    Potassium 5.0 3.5 - 5.3 mmol/L    Chloride 109 (H) 98 - 107  mmol/L    Bicarbonate 28 21 - 32 mmol/L    Anion Gap 11 10 - 20 mmol/L    Urea Nitrogen 60 (H) 6 - 23 mg/dL    Creatinine 5.23 (H) 0.50 - 1.30 mg/dL    eGFR 11 (L) >60 mL/min/1.73m*2    Calcium 9.8 8.6 - 10.6 mg/dL   Type And Screen    Collection Time: 03/07/24 12:02 PM   Result Value Ref Range    ABO TYPE O     Rh TYPE POS     ANTIBODY SCREEN NEG         Pt was recently hospitalized for fluid overload. Dr. South notified.  Recent Results (from the past 168 hour(s))   POCT GLUCOSE    Collection Time: 03/13/24  5:08 PM   Result Value Ref Range    POCT Glucose 168 (H) 74 - 99 mg/dL   POCT GLUCOSE    Collection Time: 03/13/24  8:28 PM   Result Value Ref Range    POCT Glucose 199 (H) 74 - 99 mg/dL   POCT GLUCOSE    Collection Time: 03/13/24  9:53 PM   Result Value Ref Range    POCT Glucose 262 (H) 74 - 99 mg/dL   CBC and Auto Differential    Collection Time: 03/14/24  7:28 AM   Result Value Ref Range    WBC 2.3 (L) 4.4 - 11.3 x10*3/uL    nRBC 0.0 0.0 - 0.0 /100 WBCs    RBC 2.92 (L) 4.50 - 5.90 x10*6/uL    Hemoglobin 8.6 (L) 13.5 - 17.5 g/dL    Hematocrit 27.2 (L) 41.0 - 52.0 %    MCV 93 80 - 100 fL    MCH 29.5 26.0 - 34.0 pg    MCHC 31.6 (L) 32.0 - 36.0 g/dL    RDW 15.1 (H) 11.5 - 14.5 %    Platelets 117 (L) 150 - 450 x10*3/uL    Neutrophils % 48.1 40.0 - 80.0 %    Immature Granulocytes %, Automated 0.9 0.0 - 0.9 %    Lymphocytes % 29.4 13.0 - 44.0 %    Monocytes % 17.7 2.0 - 10.0 %    Eosinophils % 3.5 0.0 - 6.0 %    Basophils % 0.4 0.0 - 2.0 %    Neutrophils Absolute 1.11 (L) 1.20 - 7.70 x10*3/uL    Immature Granulocytes Absolute, Automated 0.02 0.00 - 0.70 x10*3/uL    Lymphocytes Absolute 0.68 (L) 1.20 - 4.80 x10*3/uL    Monocytes Absolute 0.41 0.10 - 1.00 x10*3/uL    Eosinophils Absolute 0.08 0.00 - 0.70 x10*3/uL    Basophils Absolute 0.01 0.00 - 0.10 x10*3/uL   Magnesium    Collection Time: 03/14/24  7:28 AM   Result Value Ref Range    Magnesium 2.12 1.60 - 2.40 mg/dL   Renal Function Panel    Collection Time:  03/14/24  7:28 AM   Result Value Ref Range    Glucose 149 (H) 74 - 99 mg/dL    Sodium 139 136 - 145 mmol/L    Potassium 4.3 3.5 - 5.3 mmol/L    Chloride 104 98 - 107 mmol/L    Bicarbonate 26 21 - 32 mmol/L    Anion Gap 13 10 - 20 mmol/L    Urea Nitrogen 58 (H) 6 - 23 mg/dL    Creatinine 5.67 (H) 0.50 - 1.30 mg/dL    eGFR 10 (L) >60 mL/min/1.73m*2    Calcium 9.6 8.6 - 10.6 mg/dL    Phosphorus 3.0 2.5 - 4.9 mg/dL    Albumin 3.1 (L) 3.4 - 5.0 g/dL   POCT GLUCOSE    Collection Time: 03/14/24  8:13 AM   Result Value Ref Range    POCT Glucose 148 (H) 74 - 99 mg/dL   POCT GLUCOSE    Collection Time: 03/14/24 12:07 PM   Result Value Ref Range    POCT Glucose 183 (H) 74 - 99 mg/dL   POCT GLUCOSE    Collection Time: 03/14/24  6:20 PM   Result Value Ref Range    POCT Glucose 211 (H) 74 - 99 mg/dL   POCT GLUCOSE    Collection Time: 03/14/24  8:12 PM   Result Value Ref Range    POCT Glucose 242 (H) 74 - 99 mg/dL   CBC and Auto Differential    Collection Time: 03/15/24  6:21 AM   Result Value Ref Range    WBC 2.2 (L) 4.4 - 11.3 x10*3/uL    nRBC 0.0 0.0 - 0.0 /100 WBCs    RBC 2.85 (L) 4.50 - 5.90 x10*6/uL    Hemoglobin 8.3 (L) 13.5 - 17.5 g/dL    Hematocrit 25.8 (L) 41.0 - 52.0 %    MCV 91 80 - 100 fL    MCH 29.1 26.0 - 34.0 pg    MCHC 32.2 32.0 - 36.0 g/dL    RDW 15.0 (H) 11.5 - 14.5 %    Platelets 113 (L) 150 - 450 x10*3/uL    Neutrophils % 47.1 40.0 - 80.0 %    Immature Granulocytes %, Automated 1.8 (H) 0.0 - 0.9 %    Lymphocytes % 32.6 13.0 - 44.0 %    Monocytes % 13.1 2.0 - 10.0 %    Eosinophils % 4.5 0.0 - 6.0 %    Basophils % 0.9 0.0 - 2.0 %    Neutrophils Absolute 1.04 (L) 1.20 - 7.70 x10*3/uL    Immature Granulocytes Absolute, Automated 0.04 0.00 - 0.70 x10*3/uL    Lymphocytes Absolute 0.72 (L) 1.20 - 4.80 x10*3/uL    Monocytes Absolute 0.29 0.10 - 1.00 x10*3/uL    Eosinophils Absolute 0.10 0.00 - 0.70 x10*3/uL    Basophils Absolute 0.02 0.00 - 0.10 x10*3/uL   Magnesium    Collection Time: 03/15/24  6:21 AM   Result Value  Ref Range    Magnesium 1.95 1.60 - 2.40 mg/dL   Renal Function Panel    Collection Time: 03/15/24  6:21 AM   Result Value Ref Range    Glucose 264 (H) 74 - 99 mg/dL    Sodium 138 136 - 145 mmol/L    Potassium 4.3 3.5 - 5.3 mmol/L    Chloride 103 98 - 107 mmol/L    Bicarbonate 27 21 - 32 mmol/L    Anion Gap 12 10 - 20 mmol/L    Urea Nitrogen 52 (H) 6 - 23 mg/dL    Creatinine 5.40 (H) 0.50 - 1.30 mg/dL    eGFR 11 (L) >60 mL/min/1.73m*2    Calcium 9.2 8.6 - 10.6 mg/dL    Phosphorus 3.0 2.5 - 4.9 mg/dL    Albumin 3.3 (L) 3.4 - 5.0 g/dL   Tacrolimus level    Collection Time: 03/15/24  6:21 AM   Result Value Ref Range    Tacrolimus  3.5 <=15.0 ng/mL   POCT GLUCOSE    Collection Time: 03/15/24  8:08 AM   Result Value Ref Range    POCT Glucose 232 (H) 74 - 99 mg/dL   POCT GLUCOSE    Collection Time: 03/15/24 12:07 PM   Result Value Ref Range    POCT Glucose 163 (H) 74 - 99 mg/dL   POCT GLUCOSE    Collection Time: 03/15/24  5:51 PM   Result Value Ref Range    POCT Glucose 227 (H) 74 - 99 mg/dL   POCT GLUCOSE    Collection Time: 03/15/24  9:02 PM   Result Value Ref Range    POCT Glucose 227 (H) 74 - 99 mg/dL   Urine electrolytes    Collection Time: 03/16/24  2:35 AM   Result Value Ref Range    Sodium, Urine Random 98 mmol/L    Sodium/Creatinine Ratio 110 Not established. mmol/g Creat    Potassium, Urine Random 35 mmol/L    Potassium/Creatinine Ratio 39 Not established mmol/g Creat    Chloride, Urine Random 66 mmol/L    Chloride/Creatinine Ratio 74 23 - 275 mmol/g creat    Creatinine, Urine Random 88.7 20.0 - 370.0 mg/dL   Renal Function Panel    Collection Time: 03/16/24  6:20 AM   Result Value Ref Range    Glucose 190 (H) 74 - 99 mg/dL    Sodium 140 136 - 145 mmol/L    Potassium 4.2 3.5 - 5.3 mmol/L    Chloride 106 98 - 107 mmol/L    Bicarbonate 27 21 - 32 mmol/L    Anion Gap 11 10 - 20 mmol/L    Urea Nitrogen 53 (H) 6 - 23 mg/dL    Creatinine 5.22 (H) 0.50 - 1.30 mg/dL    eGFR 11 (L) >60 mL/min/1.73m*2    Calcium 9.0 8.6 -  10.6 mg/dL    Phosphorus 2.4 (L) 2.5 - 4.9 mg/dL    Albumin 3.0 (L) 3.4 - 5.0 g/dL   Magnesium    Collection Time: 03/16/24  6:20 AM   Result Value Ref Range    Magnesium 1.77 1.60 - 2.40 mg/dL   CBC and Auto Differential    Collection Time: 03/16/24  6:20 AM   Result Value Ref Range    WBC 2.8 (L) 4.4 - 11.3 x10*3/uL    nRBC 0.0 0.0 - 0.0 /100 WBCs    RBC 2.67 (L) 4.50 - 5.90 x10*6/uL    Hemoglobin 7.9 (L) 13.5 - 17.5 g/dL    Hematocrit 23.7 (L) 41.0 - 52.0 %    MCV 89 80 - 100 fL    MCH 29.6 26.0 - 34.0 pg    MCHC 33.3 32.0 - 36.0 g/dL    RDW 15.3 (H) 11.5 - 14.5 %    Platelets 100 (L) 150 - 450 x10*3/uL    Neutrophils % 54.7 40.0 - 80.0 %    Immature Granulocytes %, Automated 1.8 (H) 0.0 - 0.9 %    Lymphocytes % 26.0 13.0 - 44.0 %    Monocytes % 13.9 2.0 - 10.0 %    Eosinophils % 3.2 0.0 - 6.0 %    Basophils % 0.4 0.0 - 2.0 %    Neutrophils Absolute 1.54 1.20 - 7.70 x10*3/uL    Immature Granulocytes Absolute, Automated 0.05 0.00 - 0.70 x10*3/uL    Lymphocytes Absolute 0.73 (L) 1.20 - 4.80 x10*3/uL    Monocytes Absolute 0.39 0.10 - 1.00 x10*3/uL    Eosinophils Absolute 0.09 0.00 - 0.70 x10*3/uL    Basophils Absolute 0.01 0.00 - 0.10 x10*3/uL   PTH, Intact    Collection Time: 03/16/24  6:20 AM   Result Value Ref Range    Parathyroid Hormone, Intact 257.7 (H) 18.5 - 88.0 pg/mL   POCT GLUCOSE    Collection Time: 03/16/24  7:56 AM   Result Value Ref Range    POCT Glucose 154 (H) 74 - 99 mg/dL   POCT GLUCOSE    Collection Time: 03/16/24 12:26 PM   Result Value Ref Range    POCT Glucose 157 (H) 74 - 99 mg/dL   CBC and Auto Differential    Collection Time: 03/19/24  9:30 AM   Result Value Ref Range    WBC 2.8 (L) 4.4 - 11.3 x10*3/uL    nRBC 0.0 0.0 - 0.0 /100 WBCs    RBC 2.96 (L) 4.50 - 5.90 x10*6/uL    Hemoglobin 8.7 (L) 13.5 - 17.5 g/dL    Hematocrit 26.7 (L) 41.0 - 52.0 %    MCV 90 80 - 100 fL    MCH 29.4 26.0 - 34.0 pg    MCHC 32.6 32.0 - 36.0 g/dL    RDW 15.2 (H) 11.5 - 14.5 %    Platelets 118 (L) 150 - 450 x10*3/uL     Neutrophils % 55.8 40.0 - 80.0 %    Immature Granulocytes %, Automated 1.1 (H) 0.0 - 0.9 %    Lymphocytes % 28.0 13.0 - 44.0 %    Monocytes % 11.5 2.0 - 10.0 %    Eosinophils % 3.2 0.0 - 6.0 %    Basophils % 0.4 0.0 - 2.0 %    Neutrophils Absolute 1.56 1.20 - 7.70 x10*3/uL    Immature Granulocytes Absolute, Automated 0.03 0.00 - 0.70 x10*3/uL    Lymphocytes Absolute 0.78 (L) 1.20 - 4.80 x10*3/uL    Monocytes Absolute 0.32 0.10 - 1.00 x10*3/uL    Eosinophils Absolute 0.09 0.00 - 0.70 x10*3/uL    Basophils Absolute 0.01 0.00 - 0.10 x10*3/uL   Folate    Collection Time: 03/19/24  9:30 AM   Result Value Ref Range    Folate, Serum 19.1 >5.0 ng/mL   Ferritin    Collection Time: 03/19/24  9:30 AM   Result Value Ref Range    Ferritin 720 (H) 20 - 300 ng/mL   Vitamin B12    Collection Time: 03/19/24  9:30 AM   Result Value Ref Range    Vitamin B12 365 211 - 911 pg/mL   Iron and TIBC    Collection Time: 03/19/24  9:30 AM   Result Value Ref Range    Iron 60 35 - 150 ug/dL    UIBC 146 110 - 370 ug/dL    TIBC 206 (L) 240 - 445 ug/dL    % Saturation 29 25 - 45 %   Magnesium    Collection Time: 03/19/24  9:30 AM   Result Value Ref Range    Magnesium 1.86 1.60 - 2.40 mg/dL   Tacrolimus    Collection Time: 03/19/24  9:30 AM   Result Value Ref Range    Tacrolimus  4.1 <=15.0 ng/mL   CMV DNA, Quantitative, PCR    Collection Time: 03/19/24  9:30 AM   Result Value Ref Range    Cytomegalovirus DNA, PCR Log IU/ML      CMV DNA Result Not Detected Not Detected   Comprehensive metabolic panel    Collection Time: 03/19/24  9:30 AM   Result Value Ref Range    Glucose 149 (H) 74 - 99 mg/dL    Sodium 140 136 - 145 mmol/L    Potassium 4.2 3.5 - 5.3 mmol/L    Chloride 105 98 - 107 mmol/L    Bicarbonate 28 21 - 32 mmol/L    Anion Gap 11 10 - 20 mmol/L    Urea Nitrogen 66 (H) 6 - 23 mg/dL    Creatinine 5.92 (H) 0.50 - 1.30 mg/dL    eGFR 10 (L) >60 mL/min/1.73m*2    Calcium 9.2 8.6 - 10.6 mg/dL    Albumin 3.5 3.4 - 5.0 g/dL    Alkaline Phosphatase  42 33 - 136 U/L    Total Protein 5.9 (L) 6.4 - 8.2 g/dL    AST 27 9 - 39 U/L    Bilirubin, Total 0.3 0.0 - 1.2 mg/dL    ALT 24 10 - 52 U/L   Phosphorus    Collection Time: 03/19/24  9:30 AM   Result Value Ref Range    Phosphorus 2.9 2.5 - 4.9 mg/dL

## 2024-03-08 ENCOUNTER — DOCUMENTATION (OUTPATIENT)
Dept: TRANSPLANT | Facility: HOSPITAL | Age: 69
End: 2024-03-08
Payer: COMMERCIAL

## 2024-03-08 DIAGNOSIS — D64.9 ANEMIA, UNSPECIFIED TYPE: ICD-10-CM

## 2024-03-08 DIAGNOSIS — Z94.0 KIDNEY REPLACED BY TRANSPLANT (HHS-HCC): ICD-10-CM

## 2024-03-08 LAB — STAPHYLOCOCCUS SPEC CULT: NORMAL

## 2024-03-08 RX ORDER — CARVEDILOL 12.5 MG/1
37.5 TABLET ORAL 2 TIMES DAILY
Qty: 180 TABLET | Refills: 10 | Status: SHIPPED | OUTPATIENT
Start: 2024-03-08

## 2024-03-08 NOTE — TELEPHONE ENCOUNTER
Phone call made to the patient. He has agreed to have labs repeated on Monday 3/11. Next follow up appointment with Transplant is on 3/13.

## 2024-03-08 NOTE — PROGRESS NOTES
Reviewed with Dr. Bridges:   Creatinine: (5.69 on 3/5); (6.06); (4.43)  WBC: (2.4 on 3/5); (2.5); (2.4); (2.4)      ANC: 1.41                   Hgb: (7.9 on 3/5); (9.0); (9.7)  Tac: (2.8  on 3/5)(5.3); (4.6)       Dose: Tacrolimus 1.5 mg BID     CMV: non quant on 2/5 (- on 1/16/24)  BKV: (- on 2/20); (- on 2/13); (- on 2/5)  EBV: (non-detected on 2/1)      Plan:   1 Add iron studies/ferritin  2. Repeat tacrolimus level   3. Repeat labs next week     Follow up appointment is scheduled on 3/13.   Will call patient to update him on the above plan.

## 2024-03-09 PROCEDURE — RXMED WILLOW AMBULATORY MEDICATION CHARGE

## 2024-03-11 ENCOUNTER — CLINICAL SUPPORT (OUTPATIENT)
Dept: EMERGENCY MEDICINE | Facility: HOSPITAL | Age: 69
End: 2024-03-11
Payer: COMMERCIAL

## 2024-03-11 ENCOUNTER — HOSPITAL ENCOUNTER (INPATIENT)
Facility: HOSPITAL | Age: 69
LOS: 5 days | Discharge: HOME | End: 2024-03-16
Attending: EMERGENCY MEDICINE | Admitting: INTERNAL MEDICINE
Payer: COMMERCIAL

## 2024-03-11 ENCOUNTER — APPOINTMENT (OUTPATIENT)
Dept: UROLOGY | Facility: CLINIC | Age: 69
End: 2024-03-11
Payer: COMMERCIAL

## 2024-03-11 ENCOUNTER — APPOINTMENT (OUTPATIENT)
Dept: RADIOLOGY | Facility: HOSPITAL | Age: 69
End: 2024-03-11
Payer: COMMERCIAL

## 2024-03-11 DIAGNOSIS — N28.9 RENAL FUNCTION IMPAIRMENT: ICD-10-CM

## 2024-03-11 DIAGNOSIS — E78.5 DYSLIPIDEMIA: ICD-10-CM

## 2024-03-11 DIAGNOSIS — I15.1 HYPERTENSION SECONDARY TO OTHER RENAL DISORDERS: ICD-10-CM

## 2024-03-11 DIAGNOSIS — N17.1 ACUTE RENAL FAILURE WITH ACUTE RENAL CORTICAL NECROSIS SUPERIMPOSED ON STAGE 4 CHRONIC KIDNEY DISEASE (MULTI): ICD-10-CM

## 2024-03-11 DIAGNOSIS — N18.6 ESRD (END STAGE RENAL DISEASE) (MULTI): ICD-10-CM

## 2024-03-11 DIAGNOSIS — E87.70 HYPERVOLEMIA, UNSPECIFIED HYPERVOLEMIA TYPE: Primary | ICD-10-CM

## 2024-03-11 DIAGNOSIS — Z94.0 KIDNEY TRANSPLANTED (HHS-HCC): ICD-10-CM

## 2024-03-11 DIAGNOSIS — I15.9 SECONDARY HYPERTENSION: ICD-10-CM

## 2024-03-11 DIAGNOSIS — E11.9 DIABETES MELLITUS TYPE 2 WITHOUT RETINOPATHY (MULTI): Primary | ICD-10-CM

## 2024-03-11 DIAGNOSIS — N18.4 ACUTE RENAL FAILURE WITH ACUTE RENAL CORTICAL NECROSIS SUPERIMPOSED ON STAGE 4 CHRONIC KIDNEY DISEASE (MULTI): ICD-10-CM

## 2024-03-11 LAB
ALBUMIN SERPL BCP-MCNC: 3.4 G/DL (ref 3.4–5)
ALP SERPL-CCNC: 47 U/L (ref 33–136)
ALT SERPL W P-5'-P-CCNC: 44 U/L (ref 10–52)
ANION GAP SERPL CALC-SCNC: 13 MMOL/L (ref 10–20)
AST SERPL W P-5'-P-CCNC: 45 U/L (ref 9–39)
BASOPHILS # BLD AUTO: 0.01 X10*3/UL (ref 0–0.1)
BASOPHILS NFR BLD AUTO: 0.4 %
BILIRUB SERPL-MCNC: 0.4 MG/DL (ref 0–1.2)
BNP SERPL-MCNC: 976 PG/ML (ref 0–99)
BUN SERPL-MCNC: 56 MG/DL (ref 6–23)
CALCIUM SERPL-MCNC: 10 MG/DL (ref 8.6–10.6)
CARDIAC TROPONIN I PNL SERPL HS: 42 NG/L (ref 0–53)
CARDIAC TROPONIN I PNL SERPL HS: 45 NG/L (ref 0–53)
CHLORIDE SERPL-SCNC: 109 MMOL/L (ref 98–107)
CO2 SERPL-SCNC: 26 MMOL/L (ref 21–32)
CREAT SERPL-MCNC: 5.08 MG/DL (ref 0.5–1.3)
EGFRCR SERPLBLD CKD-EPI 2021: 12 ML/MIN/1.73M*2
EOSINOPHIL # BLD AUTO: 0.06 X10*3/UL (ref 0–0.7)
EOSINOPHIL NFR BLD AUTO: 2.4 %
ERYTHROCYTE [DISTWIDTH] IN BLOOD BY AUTOMATED COUNT: 15.5 % (ref 11.5–14.5)
FLUAV RNA RESP QL NAA+PROBE: NOT DETECTED
FLUBV RNA RESP QL NAA+PROBE: NOT DETECTED
GLUCOSE BLD MANUAL STRIP-MCNC: 174 MG/DL (ref 74–99)
GLUCOSE BLD MANUAL STRIP-MCNC: 273 MG/DL (ref 74–99)
GLUCOSE SERPL-MCNC: 169 MG/DL (ref 74–99)
HCT VFR BLD AUTO: 23.1 % (ref 41–52)
HGB BLD-MCNC: 8 G/DL (ref 13.5–17.5)
IMM GRANULOCYTES # BLD AUTO: 0.07 X10*3/UL (ref 0–0.7)
IMM GRANULOCYTES NFR BLD AUTO: 2.8 % (ref 0–0.9)
LIPASE SERPL-CCNC: 9 U/L (ref 9–82)
LYMPHOCYTES # BLD AUTO: 0.57 X10*3/UL (ref 1.2–4.8)
LYMPHOCYTES NFR BLD AUTO: 23 %
MAGNESIUM SERPL-MCNC: 1.95 MG/DL (ref 1.6–2.4)
MCH RBC QN AUTO: 29.7 PG (ref 26–34)
MCHC RBC AUTO-ENTMCNC: 34.6 G/DL (ref 32–36)
MCV RBC AUTO: 86 FL (ref 80–100)
MONOCYTES # BLD AUTO: 0.37 X10*3/UL (ref 0.1–1)
MONOCYTES NFR BLD AUTO: 14.9 %
NEUTROPHILS # BLD AUTO: 1.4 X10*3/UL (ref 1.2–7.7)
NEUTROPHILS NFR BLD AUTO: 56.5 %
NRBC BLD-RTO: 0 /100 WBCS (ref 0–0)
PHOSPHATE SERPL-MCNC: 2.6 MG/DL (ref 2.5–4.9)
PLATELET # BLD AUTO: 88 X10*3/UL (ref 150–450)
POTASSIUM SERPL-SCNC: 5.6 MMOL/L (ref 3.5–5.3)
PROT SERPL-MCNC: 6 G/DL (ref 6.4–8.2)
RBC # BLD AUTO: 2.69 X10*6/UL (ref 4.5–5.9)
RSV RNA RESP QL NAA+PROBE: NOT DETECTED
SARS-COV-2 RNA RESP QL NAA+PROBE: NOT DETECTED
SODIUM SERPL-SCNC: 142 MMOL/L (ref 136–145)
WBC # BLD AUTO: 2.5 X10*3/UL (ref 4.4–11.3)

## 2024-03-11 PROCEDURE — 71045 X-RAY EXAM CHEST 1 VIEW: CPT | Performed by: RADIOLOGY

## 2024-03-11 PROCEDURE — 99223 1ST HOSP IP/OBS HIGH 75: CPT | Performed by: INTERNAL MEDICINE

## 2024-03-11 PROCEDURE — 99285 EMERGENCY DEPT VISIT HI MDM: CPT | Performed by: EMERGENCY MEDICINE

## 2024-03-11 PROCEDURE — 80069 RENAL FUNCTION PANEL: CPT | Mod: CCI

## 2024-03-11 PROCEDURE — 82947 ASSAY GLUCOSE BLOOD QUANT: CPT

## 2024-03-11 PROCEDURE — 99223 1ST HOSP IP/OBS HIGH 75: CPT | Performed by: HOSPITALIST

## 2024-03-11 PROCEDURE — 36415 COLL VENOUS BLD VENIPUNCTURE: CPT | Performed by: STUDENT IN AN ORGANIZED HEALTH CARE EDUCATION/TRAINING PROGRAM

## 2024-03-11 PROCEDURE — 83735 ASSAY OF MAGNESIUM: CPT | Performed by: STUDENT IN AN ORGANIZED HEALTH CARE EDUCATION/TRAINING PROGRAM

## 2024-03-11 PROCEDURE — 99285 EMERGENCY DEPT VISIT HI MDM: CPT | Mod: 25

## 2024-03-11 PROCEDURE — 84484 ASSAY OF TROPONIN QUANT: CPT | Performed by: STUDENT IN AN ORGANIZED HEALTH CARE EDUCATION/TRAINING PROGRAM

## 2024-03-11 PROCEDURE — 83690 ASSAY OF LIPASE: CPT | Performed by: STUDENT IN AN ORGANIZED HEALTH CARE EDUCATION/TRAINING PROGRAM

## 2024-03-11 PROCEDURE — 2500000002 HC RX 250 W HCPCS SELF ADMINISTERED DRUGS (ALT 637 FOR MEDICARE OP, ALT 636 FOR OP/ED)

## 2024-03-11 PROCEDURE — 85025 COMPLETE CBC W/AUTO DIFF WBC: CPT | Performed by: STUDENT IN AN ORGANIZED HEALTH CARE EDUCATION/TRAINING PROGRAM

## 2024-03-11 PROCEDURE — 83735 ASSAY OF MAGNESIUM: CPT

## 2024-03-11 PROCEDURE — 2500000001 HC RX 250 WO HCPCS SELF ADMINISTERED DRUGS (ALT 637 FOR MEDICARE OP)

## 2024-03-11 PROCEDURE — 83540 ASSAY OF IRON: CPT | Performed by: STUDENT IN AN ORGANIZED HEALTH CARE EDUCATION/TRAINING PROGRAM

## 2024-03-11 PROCEDURE — 71045 X-RAY EXAM CHEST 1 VIEW: CPT

## 2024-03-11 PROCEDURE — 93005 ELECTROCARDIOGRAM TRACING: CPT

## 2024-03-11 PROCEDURE — 82728 ASSAY OF FERRITIN: CPT | Performed by: STUDENT IN AN ORGANIZED HEALTH CARE EDUCATION/TRAINING PROGRAM

## 2024-03-11 PROCEDURE — 2500000004 HC RX 250 GENERAL PHARMACY W/ HCPCS (ALT 636 FOR OP/ED)

## 2024-03-11 PROCEDURE — 80069 RENAL FUNCTION PANEL: CPT | Performed by: STUDENT IN AN ORGANIZED HEALTH CARE EDUCATION/TRAINING PROGRAM

## 2024-03-11 PROCEDURE — 87637 SARSCOV2&INF A&B&RSV AMP PRB: CPT | Performed by: STUDENT IN AN ORGANIZED HEALTH CARE EDUCATION/TRAINING PROGRAM

## 2024-03-11 PROCEDURE — 83880 ASSAY OF NATRIURETIC PEPTIDE: CPT | Performed by: EMERGENCY MEDICINE

## 2024-03-11 PROCEDURE — 2500000004 HC RX 250 GENERAL PHARMACY W/ HCPCS (ALT 636 FOR OP/ED): Performed by: STUDENT IN AN ORGANIZED HEALTH CARE EDUCATION/TRAINING PROGRAM

## 2024-03-11 PROCEDURE — 93010 ELECTROCARDIOGRAM REPORT: CPT | Performed by: EMERGENCY MEDICINE

## 2024-03-11 PROCEDURE — 1100000001 HC PRIVATE ROOM DAILY

## 2024-03-11 PROCEDURE — 84100 ASSAY OF PHOSPHORUS: CPT | Performed by: STUDENT IN AN ORGANIZED HEALTH CARE EDUCATION/TRAINING PROGRAM

## 2024-03-11 RX ORDER — TACROLIMUS 0.5 MG/1
1.5 CAPSULE ORAL
Status: DISCONTINUED | OUTPATIENT
Start: 2024-03-11 | End: 2024-03-15

## 2024-03-11 RX ORDER — BUMETANIDE 0.25 MG/ML
4 INJECTION INTRAMUSCULAR; INTRAVENOUS ONCE
Status: COMPLETED | OUTPATIENT
Start: 2024-03-11 | End: 2024-03-11

## 2024-03-11 RX ORDER — AZATHIOPRINE 50 MG/1
25 TABLET ORAL DAILY
Status: DISCONTINUED | OUTPATIENT
Start: 2024-03-11 | End: 2024-03-16 | Stop reason: HOSPADM

## 2024-03-11 RX ORDER — ISOSORBIDE MONONITRATE 60 MG/1
60 TABLET, EXTENDED RELEASE ORAL DAILY
Status: DISCONTINUED | OUTPATIENT
Start: 2024-03-11 | End: 2024-03-16 | Stop reason: HOSPADM

## 2024-03-11 RX ORDER — PRAVASTATIN SODIUM 20 MG/1
10 TABLET ORAL NIGHTLY
Status: DISCONTINUED | OUTPATIENT
Start: 2024-03-11 | End: 2024-03-16 | Stop reason: HOSPADM

## 2024-03-11 RX ORDER — PRAVASTATIN SODIUM 40 MG/1
40 TABLET ORAL NIGHTLY
Status: DISCONTINUED | OUTPATIENT
Start: 2024-03-11 | End: 2024-03-11

## 2024-03-11 RX ORDER — PREDNISONE 5 MG/1
5 TABLET ORAL DAILY
Status: DISCONTINUED | OUTPATIENT
Start: 2024-03-11 | End: 2024-03-16 | Stop reason: HOSPADM

## 2024-03-11 RX ORDER — INSULIN GLARGINE 100 [IU]/ML
20 INJECTION, SOLUTION SUBCUTANEOUS EVERY MORNING
Qty: 15 ML | Refills: 3 | Status: SHIPPED | OUTPATIENT
Start: 2024-03-11 | End: 2024-05-19 | Stop reason: HOSPADM

## 2024-03-11 RX ORDER — CINACALCET 30 MG/1
30 TABLET, FILM COATED ORAL DAILY
Status: DISCONTINUED | OUTPATIENT
Start: 2024-03-11 | End: 2024-03-16 | Stop reason: HOSPADM

## 2024-03-11 RX ORDER — PANTOPRAZOLE SODIUM 40 MG/1
40 TABLET, DELAYED RELEASE ORAL
Status: DISCONTINUED | OUTPATIENT
Start: 2024-03-12 | End: 2024-03-16 | Stop reason: HOSPADM

## 2024-03-11 RX ORDER — NIFEDIPINE 60 MG/1
60 TABLET, FILM COATED, EXTENDED RELEASE ORAL 2 TIMES DAILY
Status: DISCONTINUED | OUTPATIENT
Start: 2024-03-11 | End: 2024-03-16 | Stop reason: HOSPADM

## 2024-03-11 RX ORDER — CALCITRIOL 0.25 UG/1
0.25 CAPSULE ORAL DAILY
Status: DISCONTINUED | OUTPATIENT
Start: 2024-03-11 | End: 2024-03-16 | Stop reason: HOSPADM

## 2024-03-11 RX ORDER — SULFAMETHOXAZOLE AND TRIMETHOPRIM 400; 80 MG/1; MG/1
1 TABLET ORAL 2 TIMES DAILY
Status: DISCONTINUED | OUTPATIENT
Start: 2024-03-11 | End: 2024-03-12

## 2024-03-11 RX ORDER — DOCUSATE SODIUM 100 MG/1
100 CAPSULE, LIQUID FILLED ORAL DAILY PRN
Status: DISCONTINUED | OUTPATIENT
Start: 2024-03-11 | End: 2024-03-16 | Stop reason: HOSPADM

## 2024-03-11 RX ORDER — CHOLECALCIFEROL (VITAMIN D3) 25 MCG
1000 TABLET ORAL DAILY
Status: DISCONTINUED | OUTPATIENT
Start: 2024-03-11 | End: 2024-03-16 | Stop reason: HOSPADM

## 2024-03-11 RX ORDER — DEXTROSE 50 % IN WATER (D50W) INTRAVENOUS SYRINGE
25
Status: DISCONTINUED | OUTPATIENT
Start: 2024-03-11 | End: 2024-03-16 | Stop reason: HOSPADM

## 2024-03-11 RX ORDER — INSULIN LISPRO 100 [IU]/ML
0-5 INJECTION, SOLUTION INTRAVENOUS; SUBCUTANEOUS
Status: DISCONTINUED | OUTPATIENT
Start: 2024-03-11 | End: 2024-03-16 | Stop reason: HOSPADM

## 2024-03-11 RX ORDER — ONDANSETRON 4 MG/1
4 TABLET, FILM COATED ORAL EVERY 8 HOURS PRN
Status: DISCONTINUED | OUTPATIENT
Start: 2024-03-11 | End: 2024-03-16 | Stop reason: HOSPADM

## 2024-03-11 RX ORDER — DEXTROSE MONOHYDRATE 100 MG/ML
0.3 INJECTION, SOLUTION INTRAVENOUS ONCE AS NEEDED
Status: DISCONTINUED | OUTPATIENT
Start: 2024-03-11 | End: 2024-03-16 | Stop reason: HOSPADM

## 2024-03-11 RX ORDER — HEPARIN SODIUM 5000 [USP'U]/ML
5000 INJECTION, SOLUTION INTRAVENOUS; SUBCUTANEOUS EVERY 8 HOURS
Status: DISCONTINUED | OUTPATIENT
Start: 2024-03-11 | End: 2024-03-16 | Stop reason: HOSPADM

## 2024-03-11 RX ORDER — MULTIVIT-MIN/IRON FUM/FOLIC AC 7.5 MG-4
1 TABLET ORAL DAILY
Status: DISCONTINUED | OUTPATIENT
Start: 2024-03-11 | End: 2024-03-16 | Stop reason: HOSPADM

## 2024-03-11 RX ORDER — HYDRALAZINE HYDROCHLORIDE 25 MG/1
100 TABLET, FILM COATED ORAL 3 TIMES DAILY
Status: DISCONTINUED | OUTPATIENT
Start: 2024-03-11 | End: 2024-03-14

## 2024-03-11 RX ORDER — ACETAMINOPHEN 325 MG/1
975 TABLET ORAL ONCE
Status: COMPLETED | OUTPATIENT
Start: 2024-03-11 | End: 2024-03-11

## 2024-03-11 RX ORDER — NAPROXEN SODIUM 220 MG/1
81 TABLET, FILM COATED ORAL DAILY
Status: DISCONTINUED | OUTPATIENT
Start: 2024-03-11 | End: 2024-03-16 | Stop reason: HOSPADM

## 2024-03-11 RX ADMIN — HEPARIN SODIUM 5000 UNITS: 5000 INJECTION INTRAVENOUS; SUBCUTANEOUS at 09:30

## 2024-03-11 RX ADMIN — NIFEDIPINE 60 MG: 60 TABLET, FILM COATED, EXTENDED RELEASE ORAL at 20:18

## 2024-03-11 RX ADMIN — PRAVASTATIN SODIUM 10 MG: 20 TABLET ORAL at 20:57

## 2024-03-11 RX ADMIN — SULFAMETHOXAZOLE AND TRIMETHOPRIM 1 TABLET: 400; 80 TABLET ORAL at 20:20

## 2024-03-11 RX ADMIN — SULFAMETHOXAZOLE AND TRIMETHOPRIM 1 TABLET: 400; 80 TABLET ORAL at 09:28

## 2024-03-11 RX ADMIN — TACROLIMUS 1.5 MG: 0.5 CAPSULE ORAL at 09:25

## 2024-03-11 RX ADMIN — TACROLIMUS 1.5 MG: 0.5 CAPSULE ORAL at 18:15

## 2024-03-11 RX ADMIN — AZATHIOPRINE 25 MG: 50 TABLET ORAL at 09:28

## 2024-03-11 RX ADMIN — CARVEDILOL 37.5 MG: 25 TABLET, FILM COATED ORAL at 20:18

## 2024-03-11 RX ADMIN — ISOSORBIDE MONONITRATE 60 MG: 60 TABLET, EXTENDED RELEASE ORAL at 09:27

## 2024-03-11 RX ADMIN — BUMETANIDE 4 MG: 0.25 INJECTION, SOLUTION INTRAMUSCULAR; INTRAVENOUS at 15:20

## 2024-03-11 RX ADMIN — NIFEDIPINE 60 MG: 60 TABLET, FILM COATED, EXTENDED RELEASE ORAL at 09:24

## 2024-03-11 RX ADMIN — BUMETANIDE 1 MG/HR: 0.25 INJECTION INTRAMUSCULAR; INTRAVENOUS at 18:15

## 2024-03-11 RX ADMIN — ACETAMINOPHEN 975 MG: 325 TABLET ORAL at 06:13

## 2024-03-11 RX ADMIN — Medication 1 TABLET: at 09:26

## 2024-03-11 RX ADMIN — INSULIN LISPRO 1 UNITS: 100 INJECTION, SOLUTION INTRAVENOUS; SUBCUTANEOUS at 18:16

## 2024-03-11 RX ADMIN — BUMETANIDE 4 MG: 0.25 INJECTION, SOLUTION INTRAMUSCULAR; INTRAVENOUS at 09:29

## 2024-03-11 RX ADMIN — Medication 1000 UNITS: at 09:27

## 2024-03-11 RX ADMIN — ASPIRIN 81 MG 81 MG: 81 TABLET ORAL at 09:29

## 2024-03-11 RX ADMIN — PREDNISONE 5 MG: 5 TABLET ORAL at 09:27

## 2024-03-11 RX ADMIN — CINACALCET 30 MG: 30 TABLET, FILM COATED ORAL at 09:24

## 2024-03-11 RX ADMIN — CARVEDILOL 37.5 MG: 25 TABLET, FILM COATED ORAL at 09:26

## 2024-03-11 SDOH — SOCIAL STABILITY: SOCIAL INSECURITY: ARE YOU OR HAVE YOU BEEN THREATENED OR ABUSED PHYSICALLY, EMOTIONALLY, OR SEXUALLY BY ANYONE?: NO

## 2024-03-11 SDOH — SOCIAL STABILITY: SOCIAL INSECURITY: DOES ANYONE TRY TO KEEP YOU FROM HAVING/CONTACTING OTHER FRIENDS OR DOING THINGS OUTSIDE YOUR HOME?: NO

## 2024-03-11 SDOH — SOCIAL STABILITY: SOCIAL INSECURITY: HAS ANYONE EVER THREATENED TO HURT YOUR FAMILY OR YOUR PETS?: NO

## 2024-03-11 SDOH — SOCIAL STABILITY: SOCIAL INSECURITY: DO YOU FEEL ANYONE HAS EXPLOITED OR TAKEN ADVANTAGE OF YOU FINANCIALLY OR OF YOUR PERSONAL PROPERTY?: NO

## 2024-03-11 SDOH — SOCIAL STABILITY: SOCIAL INSECURITY: HAVE YOU HAD THOUGHTS OF HARMING ANYONE ELSE?: NO

## 2024-03-11 SDOH — SOCIAL STABILITY: SOCIAL INSECURITY: DO YOU FEEL UNSAFE GOING BACK TO THE PLACE WHERE YOU ARE LIVING?: NO

## 2024-03-11 SDOH — SOCIAL STABILITY: SOCIAL INSECURITY: ABUSE: ADULT

## 2024-03-11 SDOH — SOCIAL STABILITY: SOCIAL INSECURITY: WERE YOU ABLE TO COMPLETE ALL THE BEHAVIORAL HEALTH SCREENINGS?: YES

## 2024-03-11 SDOH — SOCIAL STABILITY: SOCIAL INSECURITY: ARE THERE ANY APPARENT SIGNS OF INJURIES/BEHAVIORS THAT COULD BE RELATED TO ABUSE/NEGLECT?: NO

## 2024-03-11 ASSESSMENT — COGNITIVE AND FUNCTIONAL STATUS - GENERAL
WALKING IN HOSPITAL ROOM: A LITTLE
DAILY ACTIVITIY SCORE: 24
MOVING FROM LYING ON BACK TO SITTING ON SIDE OF FLAT BED WITH BEDRAILS: A LITTLE
CLIMB 3 TO 5 STEPS WITH RAILING: A LITTLE
MOVING TO AND FROM BED TO CHAIR: A LITTLE
TURNING FROM BACK TO SIDE WHILE IN FLAT BAD: A LITTLE
MOBILITY SCORE: 18
PATIENT BASELINE BEDBOUND: NO
STANDING UP FROM CHAIR USING ARMS: A LITTLE

## 2024-03-11 ASSESSMENT — LIFESTYLE VARIABLES
PRESCIPTION_ABUSE_PAST_12_MONTHS: NO
HAVE PEOPLE ANNOYED YOU BY CRITICIZING YOUR DRINKING: NO
SUBSTANCE_ABUSE_PAST_12_MONTHS: NO
HAVE YOU EVER FELT YOU SHOULD CUT DOWN ON YOUR DRINKING: NO
SKIP TO QUESTIONS 9-10: 0
HOW MANY STANDARD DRINKS CONTAINING ALCOHOL DO YOU HAVE ON A TYPICAL DAY: PATIENT DECLINED
AUDIT-C TOTAL SCORE: -1
EVER FELT BAD OR GUILTY ABOUT YOUR DRINKING: NO
EVER HAD A DRINK FIRST THING IN THE MORNING TO STEADY YOUR NERVES TO GET RID OF A HANGOVER: NO
AUDIT-C TOTAL SCORE: -1
HOW OFTEN DO YOU HAVE 6 OR MORE DRINKS ON ONE OCCASION: PATIENT DECLINED
HOW OFTEN DO YOU HAVE A DRINK CONTAINING ALCOHOL: NEVER

## 2024-03-11 ASSESSMENT — ACTIVITIES OF DAILY LIVING (ADL)
HEARING - RIGHT EAR: FUNCTIONAL
WALKS IN HOME: INDEPENDENT
ADEQUATE_TO_COMPLETE_ADL: YES
TOILETING: INDEPENDENT
LACK_OF_TRANSPORTATION: NO
GROOMING: INDEPENDENT
BATHING: INDEPENDENT
JUDGMENT_ADEQUATE_SAFELY_COMPLETE_DAILY_ACTIVITIES: YES
DRESSING YOURSELF: INDEPENDENT
HEARING - LEFT EAR: FUNCTIONAL
PATIENT'S MEMORY ADEQUATE TO SAFELY COMPLETE DAILY ACTIVITIES?: YES
FEEDING YOURSELF: INDEPENDENT

## 2024-03-11 ASSESSMENT — PAIN - FUNCTIONAL ASSESSMENT
PAIN_FUNCTIONAL_ASSESSMENT: 0-10

## 2024-03-11 ASSESSMENT — PAIN SCALES - GENERAL
PAINLEVEL_OUTOF10: 10 - WORST POSSIBLE PAIN
PAINLEVEL_OUTOF10: 0 - NO PAIN
PAINLEVEL_OUTOF10: 2

## 2024-03-11 ASSESSMENT — PATIENT HEALTH QUESTIONNAIRE - PHQ9
2. FEELING DOWN, DEPRESSED OR HOPELESS: NOT AT ALL
1. LITTLE INTEREST OR PLEASURE IN DOING THINGS: NOT AT ALL
SUM OF ALL RESPONSES TO PHQ9 QUESTIONS 1 & 2: 0

## 2024-03-11 ASSESSMENT — PAIN DESCRIPTION - PAIN TYPE: TYPE: CHRONIC PAIN

## 2024-03-11 ASSESSMENT — PAIN SCALES - WONG BAKER: WONGBAKER_NUMERICALRESPONSE: HURTS EVEN MORE

## 2024-03-11 ASSESSMENT — PAIN DESCRIPTION - LOCATION
LOCATION: BACK
LOCATION: BACK

## 2024-03-11 ASSESSMENT — PAIN DESCRIPTION - PROGRESSION: CLINICAL_PROGRESSION: NOT CHANGED

## 2024-03-11 ASSESSMENT — PAIN DESCRIPTION - DESCRIPTORS: DESCRIPTORS: ACHING

## 2024-03-11 NOTE — H&P
"History Of Present Illness  Manolo Bashir is a 68-year-old male with a past medical history of ESRD s/p renal transplant x 2 in 1992 in 2013 on immunosuppression, MGUS, pancytopenia, diabetes, hypertension, prostate cancer s/p radical prostatectomy, HCV fully treated with RNA not detected 10/18/2023 presenting to the emergency department with shortness of breath.     Reports that last night he started experiencing worsening orthopnea. States that he \"feels volume overloaded.\"  He checked his blood pressure at home and reports that it was low a few days ago so he started drinking salt water and stopped taking his hydralazine.  Reports that he has had similar admissions in the past which were treated with diuresis. Endorses that this episode was associated with vomiting, he did not see any blood. Denies any recent illnesses, CP/SOB, abdominal pain, headache, vision changes, fevers, chills, constipation or diarrhea.    Reports that he is being worked up for reinitiating of dialysis and recently had vein mapping done 2/28. He has a scheduled appointment for graft placement on 3/21. He follows with Dr. Arroyo.    In the ED:  Vitals: /78, HR 62, POX 96%, 36.6 C  CBC: WBC 2.5, Hb 8, platelets 88  RFP: , K5.6, , HCO3 26, BUN 56, creatinine 5.08    CXR: Extensive patchy airspace opacities throughout right lung, findings of perihilar interstitial markings bilaterally right pleural effusion and pulmonary edema, probable small left pleural effusion       Past Medical History  Past Medical History:   Diagnosis Date    Anemia     Arthritis     Cataract     Chronic kidney disease, stage 3 unspecified (CMS/[facility]) 09/26/2018    Stage 3 chronic kidney disease    CKD (chronic kidney disease)     stage V    COVID-19 06/18/2020    COVID-19 virus infection    Diabetes (CMS/[facility])     ESRD (end stage renal disease) (CMS/[facility])     Focal and segmental proliferative glomerulonephritis 12/23/2023    HTN (hypertension)     " Hyperlipidemia     Other long term (current) drug therapy 07/20/2021    High risk medication use    Personal history of other diseases of the circulatory system     Personal history of cardiac murmur    Personal history of other infectious and parasitic diseases 08/17/2015    History of hepatitis    Polyp, colonic 08/17/2023    Primary osteoarthritis of both ankles 08/17/2023    Prostate cancer (CMS/HCC)     Tubular adenoma of colon 08/17/2023    Unspecified kidney failure 08/17/2016    Renal failure       Surgical History  Past Surgical History:   Procedure Laterality Date    ILEOSTOMY  04/25/2017    Ileostomy    ILEOSTOMY CLOSURE  08/17/2015    Ileostomy Closure    OTHER SURGICAL HISTORY  04/21/2017    Right Hemicolectomy    OTHER SURGICAL HISTORY  08/17/2015    Arteriovenous Surgery Creation Of A-V Fistula    OTHER SURGICAL HISTORY  08/17/2015    Sigmoidoscopy (Fiberoptic, Therapeutic )    PROSTATECTOMY  10/11/2013    Prostatectomy Radical    TRANSPLANT, KIDNEY, OPEN  1992    TRANSPLANT, KIDNEY, OPEN  2013    US GUIDED PERCUTANEOUS BIOPSY RENAL LEFT Left 11/20/2023    US GUIDED PERCUTANEOUS BIOPSY RENAL LEFT 11/20/2023 Lou Rodgers MD [facility]    US GUIDED PERCUTANEOUS PERITONEAL OR RETROPERITONEAL FLUID COLLECTION DRAINAGE  10/20/2022    US GUIDED PERCUTANEOUS PERITONEAL OR RETROPERITONEAL FLUID COLLECTION DRAINAGE 10/20/2022 [facility] CL[facility]ICAL LEGACY        Social History  He reports that he has never smoked. He has been exposed to tobacco smoke. He has never used smokeless tobacco. He reports current alcohol use. He reports current drug use. Drug: Oxycodone.    Family History  Family History   Problem Relation Name Age of Onset    Bone cancer Mother      Other (corona's sarcome of the bone marrow) Mother      Prostate cancer Father      Diabetes Other Family Hist     Hypertension Other Family Hist         Allergies  Patient has no known allergies.    Review of Systems     Physical Exam  Constitutional:       General:  "He is not in acute distress.     Appearance: He is not toxic-appearing.   Cardiovascular:      Rate and Rhythm: Normal rate and regular rhythm.   Pulmonary:      Effort: Pulmonary effort is normal.      Breath sounds: Normal breath sounds.   Abdominal:      General: There is no distension.      Tenderness: There is no abdominal tenderness.   Musculoskeletal:      Cervical back: Normal range of motion.   Neurological:      Mental Status: He is alert.       Last Recorded Vitals  Blood pressure 127/66, pulse 61, temperature 36.6 °C (97.8 °F), temperature source Oral, resp. rate (!) 24, height 1.727 m (5' 8\"), weight 78.9 kg (174 lb), SpO2 96 %.    Assessment and plan:  Manolo Bashir is a 68-year-old male with a past medical history of ESRD s/p renal transplant x 2 in 1992 in 2013 on immunosuppression, MGUS, pancytopenia, diabetes, hypertension, prostate cancer s/p radical prostatectomy, HCV fully treated with RNA not detected 10/18/2023 presenting to the emergency department with shortness of breath.  CXR concerning for fluid overload with pleural effusions.  Suspect represents fluid overload in setting of CKD, excess salt water and discontinuation of hydralazine.    #Fluid overload   #hypertension  :: TTE 11/18 showed EF 60 to 65%, left ventricular cavity moderately dilated, severe concentric left ventricular hypertrophy, mild to moderate aortic valve regurgitation  - Hold home Hydralazine 25 mg PO TID  - Continue home Amlodipine 10 mg PO daily  - Continue home Carvedilol 37.5mg PO BID   - Continue home Imdur 60 mg  - Bumex 4 mg x 1  - Strict I's and O's  - Daily weights  - Monitor need for Cedeño placement given prostatectomy history    #ESRD s/p kidney transplant  :: impaired allograft function, CKD stage 4-5  - Continue home Tacorlimus 1.5mg BID  - Continue home Prednisone 5mg daily  - Continue home Imuran 25mg daily  - Continue home Bactrim  - Continue home Sensipar and vitamin D3/calcitriol     #hyperlipidemia  - " Continue home pravastatin 40 mg nightly    #CAD  - Continue home ASA 81    #DM  - Low dose sliding scale    #Hx of Back pain  ::MRI 09/19/2023 showing nonspecific focus of abnormal signal in R pedicle of L4 c/w osseous hemangioma, stress fracture, or osteoid osteoma. No discitis or osteomyelitis. No evidence of metastasis.  -Tylenol 650 mg q8h PRN    F: PRN  E: PRN  N: Regular  A: PIV    Full Code  NOK: Amalia     Hu Porter MD  PGY1 Neurology

## 2024-03-11 NOTE — ED PROVIDER NOTES
"HPI   Chief Complaint   Patient presents with    Shortness of Breath       HPI     Patient is a 68-year-old male with a past medical history of ESRD s/p renal transplant x 2 in 1992 in 2013 on immunosuppression, MGUS, pancytopenia, diabetes, hypertension, prostate cancer s/p radical prostatectomy, HCV fully treated with RNA not detected 10/18/2023 presenting to the emergency department with shortness of breath.  Patient states that he has had a day worth of increasing shortness of breath associated with worsening orthopnea and trouble breathing because his symptoms worsen with lying down.  States that he feels \"volume overloaded\".  Denies any chest pain, abdominal pain, back pain, recent trauma, headache, vision changes, hearing changes, numbness, tingling, cough, congestion, fever, chills.  States that he is being worked up for possible reinitiation of dialysis as his transplanted kidney has been slowly developing worsened function.               Jordyn Coma Scale Score: 15                     Patient History   Past Medical History:   Diagnosis Date    Anemia     Arthritis     Cataract     Chronic kidney disease, stage 3 unspecified (CMS/HCC) 09/26/2018    Stage 3 chronic kidney disease    CKD (chronic kidney disease)     stage V    COVID-19 06/18/2020    COVID-19 virus infection    Diabetes (CMS/HCC)     ESRD (end stage renal disease) (CMS/HCC)     Focal and segmental proliferative glomerulonephritis 12/23/2023    HTN (hypertension)     Hyperlipidemia     Other long term (current) drug therapy 07/20/2021    High risk medication use    Personal history of other diseases of the circulatory system     Personal history of cardiac murmur    Personal history of other infectious and parasitic diseases 08/17/2015    History of hepatitis    Polyp, colonic 08/17/2023    Primary osteoarthritis of both ankles 08/17/2023    Prostate cancer (CMS/HCC)     Tubular adenoma of colon 08/17/2023    Unspecified kidney failure " 08/17/2016    Renal failure     Past Surgical History:   Procedure Laterality Date    ILEOSTOMY  04/25/2017    Ileostomy    ILEOSTOMY CLOSURE  08/17/2015    Ileostomy Closure    OTHER SURGICAL HISTORY  04/21/2017    Right Hemicolectomy    OTHER SURGICAL HISTORY  08/17/2015    Arteriovenous Surgery Creation Of A-V Fistula    OTHER SURGICAL HISTORY  08/17/2015    Sigmoidoscopy (Fiberoptic, Therapeutic )    PROSTATECTOMY  10/11/2013    Prostatectomy Radical    TRANSPLANT, KIDNEY, OPEN  1992    TRANSPLANT, KIDNEY, OPEN  2013    US GUIDED PERCUTANEOUS BIOPSY RENAL LEFT Left 11/20/2023    US GUIDED PERCUTANEOUS BIOPSY RENAL LEFT 11/20/2023 Lou Rodgers MD [facility]    US GUIDED PERCUTANEOUS PERITONEAL OR RETROPERITONEAL FLUID COLLECTION DRAINAGE  10/20/2022    US GUIDED PERCUTANEOUS PERITONEAL OR RETROPERITONEAL FLUID COLLECTION DRAINAGE 10/20/2022 [facility] CLINICAL LEGACY     Family History   Problem Relation Name Age of Onset    Bone cancer Mother      Other (corona's sarcome of the bone marrow) Mother      Prostate cancer Father      Diabetes Other Family Hist     Hypertension Other Family Hist      Social History     Tobacco Use    Smoking status: Never     Passive exposure: Past    Smokeless tobacco: Never   Vaping Use    Vaping Use: Never used   Substance Use Topics    Alcohol use: Yes    Drug use: Yes     Types: Oxycodone       Physical Exam   ED Triage Vitals [03/11/24 0306]   Temperature Heart Rate Respirations BP   36.6 °C (97.8 °F) 62 16 151/78      Pulse Ox Temp Source Heart Rate Source Patient Position   96 % Oral Monitor Sitting      BP Location FiO2 (%)     Right arm 21 %       Physical Exam  Constitutional:       Appearance: He is well-developed. He is not toxic-appearing or diaphoretic.   HENT:      Head: Normocephalic and atraumatic.      Nose: Nose normal.   Eyes:      General: No scleral icterus.        Right eye: No discharge.         Left eye: No discharge.      Conjunctiva/sclera: Conjunctivae normal.    Neck:      Thyroid: No thyromegaly.      Vascular: No hepatojugular reflux.   Cardiovascular:      Rate and Rhythm: Normal rate.      Heart sounds: No murmur heard.     No friction rub. No gallop.   Pulmonary:      Effort: No tachypnea, accessory muscle usage or respiratory distress.      Breath sounds: Examination of the right-middle field reveals rhonchi. Examination of the left-middle field reveals rhonchi. Examination of the right-lower field reveals rhonchi. Examination of the left-lower field reveals rhonchi. Rhonchi present. No decreased breath sounds or wheezing.   Chest:      Chest wall: No mass, tenderness or edema.   Abdominal:      General: There is no distension.      Palpations: Abdomen is soft. There is no mass.      Tenderness: There is no abdominal tenderness. There is no guarding or rebound.   Musculoskeletal:         General: No deformity or signs of injury.      Cervical back: Neck supple. No rigidity.      Comments: Mild edema about the patient's bilateral ankles   Skin:     General: Skin is warm and dry.      Capillary Refill: Capillary refill takes less than 2 seconds.   Neurological:      General: No focal deficit present.      Mental Status: He is alert and oriented to person, place, and time.   Psychiatric:         Mood and Affect: Mood normal.         Behavior: Behavior normal.         ED Course & MDM    EKG taken at 424 showing normal sinus rate and rhythm, right axis deviation, normal intervals, no signs of acute ST elevation or depression    Medical Decision Making  Patient is a 68-year-old male presenting to the emergency department with shortness of breath and orthopnea.  Physical exam did show rhonchi in the patient's lung exam bilaterally.  The signs and symptoms are concerning for volume overload.  BNP significantly elevated over the patient's baseline.  Creatinine was near the patient's recently recorded values, but overall trend had been uptrending for the patient.  No evidence  of new severe renal failure, but chronic renal failure is present.  Patient showing no evidence of sepsis, leukocytosis.  Chest x-ray showing signs of volume overload without any symptoms of cough, chest pain, or other signs or symptoms suggestive of pneumonia.  Patient will be admitted in stable condition for treatment of volume overload in the setting of his known CKD.    Procedure  Procedures     Tommie Mallory MD  Resident  03/11/24 0643

## 2024-03-11 NOTE — PROGRESS NOTES
Pharmacy Medication History Review    Manolo Bashir is a 68 y.o. male admitted for Hypervolemia, unspecified hypervolemia type. Pharmacy reviewed the patient's [id number] medications and allergies for accuracy.    The list below reflects the updated PTA list. Comments regarding how patient may be taking medications differently can be found in the Admit Orders Activity  Prior to Admission Medications   Prescriptions Last Dose Informant Patient Reported?   Easy Touch Alcohol Prep Pads pads, medicated  Self, Friend Yes   NIFEdipine ER (Adalat CC) 60 mg 24 hr tablet  Self, Friend No   Sig: Take 1 tablet (60 mg) by mouth 2 times a day. Do not crush, chew, or split.   Unilet Super Thin Lancets 30 gauge misc  Friend, Self No   Si each 3 times a day.   acetaminophen (Tylenol) 325 mg tablet  Self, Friend No   Sig: Take 3 tablets (975 mg) by mouth every 6 hours if needed (pain).   aspirin 81 mg chewable tablet  Friend, Self No   Sig: Chew 1 tablet (81 mg) once daily.   azaTHIOprine (Imuran) 50 mg tablet  Self, Friend No   Sig: Take 0.5 tablets (25 mg) by mouth once daily.   calcitriol (Rocaltrol) 0.25 mcg capsule  Friend, Self No   Sig: Take 1 capsule (0.25 mcg) by mouth once daily. Do not start before 2023.   carvedilol (Coreg) 12.5 mg tablet  Self, Friend No   Sig: TAKE THREE (3) TABLETS BY MOUTH TWICE DAILY   chlorhexidine (Hibiclens) 4 % external liquid  Self, Friend No   Sig: Apply topically once daily as needed for wound care for up to 5 days.   cholecalciferol (Vitamin D-3) 25 MCG (1000 UT) tablet  Friend, Self No   Sig: Take 1 tablet (1,000 Units) by mouth once daily. Do not start before 2023.   cinacalcet (Sensipar) 30 mg tablet  Self, Friend No   Sig: Take 1 tablet (30 mg) by mouth once daily.   diclofenac sodium (Voltaren) 1 % gel gel  Self, Friend No   Sig: Apply 1 Application (4 grams)  topically 4 times a day as needed (back pain).   docusate sodium (Colace) 100 mg capsule   "Self, Friend Yes   Sig: Take 1 capsule (100 mg) by mouth once daily as needed.   hydrALAZINE (Apresoline) 100 mg tablet Not Taking Friend, Self No   Sig: Take 1 tablet (100 mg) by mouth 3 times a day.   Patient not taking: Reported on 3/11/2024   insulin glargine (Lantus U-100 Insulin) 100 unit/mL injection  Friend, Self Yes   Sig: Inject 20 Units under the skin once daily in the morning. Take as directed per insulin instructions.  Baslagar   insulin lispro (HumaLOG) 100 unit/mL injection  Friend, Self Yes   Sig: Inject under the skin 3 times a day with meals. 100-199 2 units 200-299 4 units 300-399 6 units   isosorbide mononitrate ER (Imdur) 60 mg 24 hr tablet  Self, Friend No   Sig: Take 1 tablet (60 mg) by mouth once daily. Do not crush or chew. Do not start before 2024.   lancets (Unilet Super Thin Lancets) 30 gauge misc  Friend, Self No   Sig: USE TO TEST BLOOD SUGAR THREE TIMES A DAY   multivitamin tablet  Friend, Self Yes   Sig: Take 1 tablet by mouth once daily.   ondansetron (Zofran) 4 mg tablet  Friend, Self Yes   Sig: Take 1 tablet (4 mg) by mouth every 8 hours if needed for nausea or vomiting.   pantoprazole (ProtoNix) 40 mg EC tablet  Self, Friend No   Sig: Take 1 tablet (40 mg) by mouth once daily in the morning. Take before meals. Do not crush, chew, or split. Do not start before 2024.   pen needle, diabetic (TechLITE Pen Needle) 32 gauge x 5/32\" needle  Friend, Self No   Si each 3 times a day.   pravastatin (Pravachol) 10 mg tablet 3/10/2024 Friend, Self No   Sig: Take 1 tablet (10 mg) by mouth once daily at bedtime.   pravastatin (Pravachol) 40 mg tablet Not Taking Self, Friend No   Sig: TAKE 1 TABLET BY MOUTH AT BEDTIME   Patient not taking: Reported on 3/11/2024   predniSONE (Deltasone) 5 mg tablet  Self, Friend Yes   Sig: Take 1 tablet (5 mg) by mouth once daily.   sodium zirconium cyclosilicate (Lokelma) 5 gram packet  Self, Friend No   Sig: Take 5 g by mouth once " daily.   sulfamethoxazole-trimethoprim (Bactrim) 400-80 mg tablet  Friend, Self No   Sig: Take 1 tablet by mouth 2 times a day.   tacrolimus (Prograf) 0.5 mg capsule  Self, Friend No   Sig: Take 1.5 mg by mouth every 12 hours.   torsemide (Demadex) 20 mg tablet  Self, Friend No   Sig: Take 2 tablets (40 mg) by mouth 2 times a day with meals.   Patient taking differently: Take 2 tablets (40 mg) by mouth once daily in the morning.      Facility-Administered Medications: None        The list below reflects the updated allergy list. Please review each documented allergy for additional clarification and justification.  Allergies  Reviewed by Ricardo Mei RN on 3/11/2024   No Known Allergies         Patient accepts M2B at discharge. Pharmacy has been updated to [facility].    Sources used to complete the med history include out patient fill history, OARRS, and patient interview- moderate historian. Patient was not very familiar with all his meds, he ended up calling a friend for assistance.       Below are additional concerns with the patient's PTA list.  Patient reports needing a refill on (Hibiclens, Pravastatin 10mg, and Nifedipine)  Patient reports taking Pravastatin 10 mg and not taking Pravastatin 40 mg  Patient reports not taking Hydralazine.  Patient reports taking Torsemide differently: 1 tab bid  Patient reports taking 20 units of Basaglar every morning.    Saman Ramey PharmD  Transitions of Care Pharmacist  [facility]  Please reach out via Secure Chat for questions, or if no response call [employer] or [facility]

## 2024-03-11 NOTE — ED TRIAGE NOTES
Patient reports that he has been having difficulty breathing that started yesterday he also endorsed 10/10 back pain

## 2024-03-11 NOTE — CONSULTS
Transplant Nephrology Consult     Date of admission: 3/11/2024     Manolo Bashir is a 68 y.o.  with PMH   Past Medical History:   Diagnosis Date    Anemia     Arthritis     Cataract     Chronic kidney disease, stage 3 unspecified (CMS/HCC) 09/26/2018    Stage 3 chronic kidney disease    CKD (chronic kidney disease)     stage V    COVID-19 06/18/2020    COVID-19 virus infection    Diabetes (CMS/HCC)     ESRD (end stage renal disease) (CMS/HCC)     Focal and segmental proliferative glomerulonephritis 12/23/2023    HTN (hypertension)     Hyperlipidemia     Other long term (current) drug therapy 07/20/2021    High risk medication use    Personal history of other diseases of the circulatory system     Personal history of cardiac murmur    Personal history of other infectious and parasitic diseases 08/17/2015    History of hepatitis    Polyp, colonic 08/17/2023    Primary osteoarthritis of both ankles 08/17/2023    Prostate cancer (CMS/HCC)     Tubular adenoma of colon 08/17/2023    Unspecified kidney failure 08/17/2016    Renal failure        History of present Illness:Manolo Bashir is a 68-year-old male with a past medical history of ESRD s/p renal transplant x 2 in 1992 in 2013 on immunosuppression, MGUS, pancytopenia, diabetes, hypertension, prostate cancer s/p radical prostatectomy, HCV fully treated with RNA not detected 10/18/2023 presenting to the emergency department with shortness of breath.          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    Immunosuppression (CMS/HCC)  08/17/2023    Kidney replaced by transplant 08/17/2023    Male erectile disorder of organic origin 08/17/2023    Hypertension 08/17/2023    Peripheral vascular disease (CMS/HCC) 08/17/2023    Vitamin D deficiency 08/17/2023    Aortic aneurysm (CMS/HCC) 08/17/2023    Pancytopenia (CMS/HCC) 10/17/2023    MGUS (monoclonal gammopathy of unknown significance) 11/07/2023    Pyelonephritis 11/16/2023    Fluid overload, unspecified 12/16/2023    Focal and segmental proliferative glomerulonephritis 12/23/2023    Immunosuppressive management encounter following kidney transplant 12/26/2023    Kidney transplanted 01/05/2024    Renal function impairment 01/16/2024    Back pain 01/21/2024    Acute renal failure superimposed on chronic kidney disease (CMS/[facility]) 08/24/2023    Allergic conjunctivitis 02/12/2024    Allergic reaction 09/14/2023    Cellulitis 02/12/2024    Cholecystitis 09/20/2023    Chronic low back pain 05/07/2023    Contact with and (suspected) exposure to covid-19 08/24/2023    Cough 02/12/2024    Dehydration 08/24/2023    Disease due to severe acute respiratory syndrome coronavirus 2 (SARS-CoV-2) 08/09/2023    Generalized hyperhidrosis 05/07/2023    History of colonic polyps 08/28/2023    Hypoxia 02/12/2024    Immunodeficiency due to drugs (CODE) (CMS/HCC) 08/24/2023    Long term (current) use of calcineurin inhibitor 08/24/2023    Methicillin resistant Staphylococcus aureus infection 02/18/2023    Personal history of COVID-19 08/24/2023    Pleural effusion 09/25/2023    Pneumonia 08/20/2023    Rash 04/03/2023    Residual hemorrhoidal skin tags 08/28/2023    Radiculitis 02/22/2024    ESRD (end stage renal disease) (CMS/HCC) 02/29/2024     Resolved Ambulatory Problems     Diagnosis Date Noted    Abnormal EKG 08/17/2023    Allergic conjunctivitis of both eyes 08/17/2023    Allergic rhinitis 08/17/2023    Anemia 08/17/2023    Angina pectoris (CMS/[facility]) 08/17/2023    Arthritis 08/17/2023    Blepharitis of both eyes  08/17/2023    Blurry vision, bilateral 08/17/2023    Chronic pain of both ankles 08/17/2023    Colloid goiter 08/17/2023    Combined form of age-related cataract, left eye 08/17/2023    Combined form of age-related cataract, right eye 08/17/2023    Constipation 08/17/2023    Depressed mood 08/17/2023    Diarrhea 08/17/2023    Difficulty walking 08/17/2023    Fatigue 08/17/2023    Hand pain, right 08/17/2023    Headache 08/17/2023    History of hepatitis C 08/17/2023    Hypercalcemia 08/17/2023    Hyperparathyroidism (CMS/[facility]) 08/17/2023    Insomnia 08/17/2023    Itching 08/17/2023    Lice 08/17/2023    Nausea and/or vomiting 08/17/2023    Osteoarthritis of ankle, left 08/17/2023    Osteoarthritis of ankle, right 08/17/2023    Primary osteoarthritis of both ankles 08/17/2023    Overweight 08/17/2023    Pain of both eyes 08/17/2023    Penile discharge 08/17/2023    Polyp, colonic 08/17/2023    Post-traumatic osteoarthritis of both ankles 08/17/2023    Refraction error 08/17/2023    Right ankle injury 08/17/2023    Secondary diabetes mellitus (CMS/HCC) 08/17/2023    Shoulder pain, left 08/17/2023    Stage 3 chronic kidney disease (CMS/HCC) 08/17/2023    STD (sexually transmitted disease) 08/17/2023    Thumb injury 08/17/2023    Onychomycosis 08/17/2023    Tinea pedis of both feet 08/17/2023    Tubular adenoma of colon 08/17/2023    Urinary incontinence 08/17/2023    UTI (urinary tract infection) 08/17/2023    Venous vascular malformations 08/17/2023    Body mass index (BMI) of 25.0 to 25.9 in adult 08/17/2023    Gastrocnemius equinus 08/17/2023    High risk medication use 08/17/2023    Leg swelling 08/17/2023    Closed nondisplaced fracture of distal phalanx of finger 08/21/2023    Lung nodule 08/21/2023    Mass of spine 08/21/2023    Proteinuria 08/21/2023    Surgical site infection 08/21/2023    Generalized abdominal pain 10/05/2023     Past Medical History:   Diagnosis Date    Cataract     Chronic kidney disease,  stage 3 unspecified (CMS/HCC) 09/26/2018    CKD (chronic kidney disease)     COVID-19 06/18/2020    Diabetes (CMS/[facility])     HTN (hypertension)     Hyperlipidemia     Other long term (current) drug therapy 07/20/2021    Personal history of other diseases of the circulatory system     Personal history of other infectious and parasitic diseases 08/17/2015    Prostate cancer (CMS/[facility])     Unspecified kidney failure 08/17/2016       Surgical History:  Past Surgical History:   Procedure Laterality Date    ILEOSTOMY  04/25/2017    Ileostomy    ILEOSTOMY CLOSURE  08/17/2015    Ileostomy Closure    OTHER SURGICAL HISTORY  04/21/2017    Right Hemicolectomy    OTHER SURGICAL HISTORY  08/17/2015    Arteriovenous Surgery Creation Of A-V Fistula    OTHER SURGICAL HISTORY  08/17/2015    Sigmoidoscopy (Fiberoptic, Therapeutic )    PROSTATECTOMY  10/11/2013    Prostatectomy Radical    TRANSPLANT, KIDNEY, OPEN  1992    TRANSPLANT, KIDNEY, OPEN  2013    US GUIDED PERCUTANEOUS BIOPSY RENAL LEFT Left 11/20/2023    US GUIDED PERCUTANEOUS BIOPSY RENAL LEFT 11/20/2023 Lou Rodgers MD [facility]    US GUIDED PERCUTANEOUS PERITONEAL OR RETROPERITONEAL FLUID COLLECTION DRAINAGE  10/20/2022    US GUIDED PERCUTANEOUS PERITONEAL OR RETROPERITONEAL FLUID COLLECTION DRAINAGE 10/20/2022 [facility]        Family HX:    Social Connections: Unknown (10/3/2023)    Social Connection and Isolation Panel [NHANES]     Frequency of Communication with Friends and Family: More than three times a week     Frequency of Social Gatherings with Friends and Family: Twice a week     Attends Congregational Services: Patient declined     Active Member of Clubs or Organizations: Patient declined     Attends Club or Organization Meetings: Patient declined     Marital Status: Never             PROBLEM LIST:  Principal Problem:    Hypervolemia, unspecified hypervolemia type         ALLERGIES:  No Known Allergies         CURRENT MEDICATIONS:  Scheduled  "medications  aspirin, 81 mg, oral, Daily  azaTHIOprine, 25 mg, oral, Daily  calcitriol, 0.25 mcg, oral, Daily  carvedilol, 37.5 mg, oral, BID  cholecalciferol, 1,000 Units, oral, Daily  cinacalcet, 30 mg, oral, Daily  heparin (porcine), 5,000 Units, subcutaneous, q8h  [Held by provider] hydrALAZINE, 100 mg, oral, TID  insulin lispro, 0-5 Units, subcutaneous, TID with meals  isosorbide mononitrate ER, 60 mg, oral, Daily  multivitamin with minerals, 1 tablet, oral, Daily  NIFEdipine ER, 60 mg, oral, BID  [START ON 3/12/2024] pantoprazole, 40 mg, oral, Daily before breakfast  pravastatin, 10 mg, oral, Nightly  predniSONE, 5 mg, oral, Daily  sodium zirconium cyclosilicate, 5 g, oral, Daily  sulfamethoxazole-trimethoprim, 1 tablet, oral, BID  tacrolimus, 1.5 mg, oral, q12h ZOË      Continuous medications     PRN medications  PRN medications: dextrose 10 % in water (D10W), dextrose, docusate sodium, glucagon, ondansetron       OBJECTIVE:    VITALS: Visit Vitals  /78   Pulse 69   Temp 36.6 °C (97.8 °F) (Oral)   Resp 15   Ht 1.727 m (5' 8\")   Wt 78.9 kg (174 lb)   SpO2 95%   BMI 26.46 kg/m²   Smoking Status Never   BSA 1.95 m²        General: No distress   Mucosa moist   AI, AC, AF     HEENT: PEERLA  CVS: S1 S2 no murmurs  RESP:  Lungs clear to auscultation   ABDO: Soft, non-tender   Neuro: A + O x 3  Skin: No rash   Extremities: No edema       LABS:  Results from last 72 hours   Lab Units 03/11/24  0431   WBC AUTO x10*3/uL 2.5*   HEMOGLOBIN g/dL 8.0*   MCV fL 86   PLATELETS AUTO x10*3/uL 88*   BUN mg/dL 56*   CREATININE mg/dL 5.08*   CALCIUM mg/dL 10.0          No intake or output data in the 24 hours ending 03/11/24 1511       ASSESSMENT AND PLAN:     Manolo Bashir is a 68 y.o.M with PMH ESRD (on HD [id number]), s/p 2x renal transplants (1992, 2013) now with impaired allograft function CKD 3 (baseline Cr 2.5-3), on immunosuppression (pred, tac, azathioprine), h/op IgG and IgA lambda MGUS (recent hematologic eval " including Bmbx with no e/o myeloma), DM2, HTN, prostate cancer s/p radical prostatectomy, baseline urinary incontinence, HCV s/p rx (PCR neg 10/2023) .  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    -Patient completed 2 doses of rituximab total of 2 g -1 /7/2024.  -Recent hospital admission as of 1/16/2024 with hypertensive urgency with fluid overload.  -Patient currently got admitted for volume overload with shortness of breath.  Transplant nephrology consulted for further management.    Allograft function:  -Impaired creatinine recently around 4-6.  Discussed with him about initiation of dialysis in case of worsening kidney function.  Will try Bumex drip and follow-up closely on kidney function.  -Hyperkalemia noticed will consider adding Lokelma 10 g 3 times daily for 48 hours.  -In case of worsening shortness of breath or pulmonary edema will consider initiating dialysis.  -No acute indication at this time we will continue conservative management for now.  -Right upper extremity AV fistula which is having good thrill but please consider ultrasound to evaluate for cannulation.  -Avoid nephrotoxins and renally dose medications.    Immunosuppression: Continue with the azathioprine, prednisone 5 mg, tacrolimus 1.5 twice daily.  Levels recently is around 2.8-3 please consider checking trough levels and will adjust her dose accordingly.    Hemodynamics: Blood pressures currently elevated, can consider Bumex drip, continue with carvedilol, Imdur, nifedipine.    Bone mineral disease: Calcium and phosphorus levels are optimal continue with the current management.    Anemia leukopenia: Please consider iron studies B12 and folate.  Patient seems to be leukopenic please check EBV PCR plasma and CMV PCR blood.        Thank you for consulting :  Dina  Favio ZABALA    Notes created by Antoine -Please excuse the Typos .

## 2024-03-12 ENCOUNTER — APPOINTMENT (OUTPATIENT)
Dept: CARDIOLOGY | Facility: HOSPITAL | Age: 69
End: 2024-03-12
Payer: COMMERCIAL

## 2024-03-12 DIAGNOSIS — Z79.4 TYPE 2 DIABETES MELLITUS WITHOUT COMPLICATION, WITH LONG-TERM CURRENT USE OF INSULIN (MULTI): ICD-10-CM

## 2024-03-12 DIAGNOSIS — E11.9 TYPE 2 DIABETES MELLITUS WITHOUT COMPLICATION, WITH LONG-TERM CURRENT USE OF INSULIN (MULTI): ICD-10-CM

## 2024-03-12 LAB
ALBUMIN SERPL BCP-MCNC: 3 G/DL (ref 3.4–5)
ALBUMIN SERPL BCP-MCNC: 3.1 G/DL (ref 3.4–5)
ALBUMIN SERPL BCP-MCNC: 3.1 G/DL (ref 3.4–5)
ANION GAP SERPL CALC-SCNC: 11 MMOL/L (ref 10–20)
ANION GAP SERPL CALC-SCNC: 12 MMOL/L (ref 10–20)
ANION GAP SERPL CALC-SCNC: 14 MMOL/L (ref 10–20)
ATRIAL RATE: 250 BPM
ATRIAL RATE: 60 BPM
ATRIAL RATE: 64 BPM
ATRIAL RATE: 65 BPM
BASOPHILS # BLD AUTO: 0.02 X10*3/UL (ref 0–0.1)
BASOPHILS NFR BLD AUTO: 0.9 %
BUN SERPL-MCNC: 57 MG/DL (ref 6–23)
BUN SERPL-MCNC: 59 MG/DL (ref 6–23)
BUN SERPL-MCNC: 59 MG/DL (ref 6–23)
CALCIUM SERPL-MCNC: 8.7 MG/DL (ref 8.6–10.6)
CALCIUM SERPL-MCNC: 9.4 MG/DL (ref 8.6–10.6)
CALCIUM SERPL-MCNC: 9.8 MG/DL (ref 8.6–10.6)
CHLORIDE SERPL-SCNC: 104 MMOL/L (ref 98–107)
CHLORIDE SERPL-SCNC: 106 MMOL/L (ref 98–107)
CHLORIDE SERPL-SCNC: 107 MMOL/L (ref 98–107)
CO2 SERPL-SCNC: 26 MMOL/L (ref 21–32)
CO2 SERPL-SCNC: 27 MMOL/L (ref 21–32)
CO2 SERPL-SCNC: 28 MMOL/L (ref 21–32)
CREAT SERPL-MCNC: 5.21 MG/DL (ref 0.5–1.3)
CREAT SERPL-MCNC: 5.3 MG/DL (ref 0.5–1.3)
CREAT SERPL-MCNC: 5.88 MG/DL (ref 0.5–1.3)
EGFRCR SERPLBLD CKD-EPI 2021: 10 ML/MIN/1.73M*2
EGFRCR SERPLBLD CKD-EPI 2021: 11 ML/MIN/1.73M*2
EGFRCR SERPLBLD CKD-EPI 2021: 11 ML/MIN/1.73M*2
EOSINOPHIL # BLD AUTO: 0.13 X10*3/UL (ref 0–0.7)
EOSINOPHIL NFR BLD AUTO: 5.6 %
ERYTHROCYTE [DISTWIDTH] IN BLOOD BY AUTOMATED COUNT: 15.4 % (ref 11.5–14.5)
FERRITIN SERPL-MCNC: 404 NG/ML (ref 20–300)
FOLATE SERPL-MCNC: 23.6 NG/ML
GLUCOSE BLD MANUAL STRIP-MCNC: 108 MG/DL (ref 74–99)
GLUCOSE BLD MANUAL STRIP-MCNC: 126 MG/DL (ref 74–99)
GLUCOSE BLD MANUAL STRIP-MCNC: 208 MG/DL (ref 74–99)
GLUCOSE BLD MANUAL STRIP-MCNC: 218 MG/DL (ref 74–99)
GLUCOSE BLD MANUAL STRIP-MCNC: 234 MG/DL (ref 74–99)
GLUCOSE SERPL-MCNC: 240 MG/DL (ref 74–99)
GLUCOSE SERPL-MCNC: 242 MG/DL (ref 74–99)
GLUCOSE SERPL-MCNC: 98 MG/DL (ref 74–99)
HCT VFR BLD AUTO: 24 % (ref 41–52)
HGB BLD-MCNC: 7.5 G/DL (ref 13.5–17.5)
IMM GRANULOCYTES # BLD AUTO: 0.02 X10*3/UL (ref 0–0.7)
IMM GRANULOCYTES NFR BLD AUTO: 0.9 % (ref 0–0.9)
IRON SATN MFR SERPL: 15 % (ref 25–45)
IRON SERPL-MCNC: 29 UG/DL (ref 35–150)
LYMPHOCYTES # BLD AUTO: 0.77 X10*3/UL (ref 1.2–4.8)
LYMPHOCYTES NFR BLD AUTO: 33.3 %
MAGNESIUM SERPL-MCNC: 1.85 MG/DL (ref 1.6–2.4)
MAGNESIUM SERPL-MCNC: 1.88 MG/DL (ref 1.6–2.4)
MAGNESIUM SERPL-MCNC: 1.97 MG/DL (ref 1.6–2.4)
MCH RBC QN AUTO: 28.5 PG (ref 26–34)
MCHC RBC AUTO-ENTMCNC: 31.3 G/DL (ref 32–36)
MCV RBC AUTO: 91 FL (ref 80–100)
MONOCYTES # BLD AUTO: 0.41 X10*3/UL (ref 0.1–1)
MONOCYTES NFR BLD AUTO: 17.7 %
NEUTROPHILS # BLD AUTO: 0.96 X10*3/UL (ref 1.2–7.7)
NEUTROPHILS NFR BLD AUTO: 41.6 %
NRBC BLD-RTO: 0 /100 WBCS (ref 0–0)
P AXIS: 69 DEGREES
P AXIS: 69 DEGREES
P AXIS: 85 DEGREES
P OFFSET: 160 MS
P OFFSET: 165 MS
P OFFSET: 170 MS
P ONSET: 127 MS
P ONSET: 127 MS
P ONSET: 130 MS
PHOSPHATE SERPL-MCNC: 2.6 MG/DL (ref 2.5–4.9)
PHOSPHATE SERPL-MCNC: 2.8 MG/DL (ref 2.5–4.9)
PHOSPHATE SERPL-MCNC: 2.9 MG/DL (ref 2.5–4.9)
PLATELET # BLD AUTO: 94 X10*3/UL (ref 150–450)
POTASSIUM SERPL-SCNC: 4.4 MMOL/L (ref 3.5–5.3)
POTASSIUM SERPL-SCNC: 5.3 MMOL/L (ref 3.5–5.3)
POTASSIUM SERPL-SCNC: 5.9 MMOL/L (ref 3.5–5.3)
PR INTERVAL: 168 MS
PR INTERVAL: 174 MS
PR INTERVAL: 178 MS
Q ONSET: 214 MS
Q ONSET: 214 MS
Q ONSET: 216 MS
Q ONSET: 218 MS
QRS COUNT: 10 BEATS
QRS COUNT: 10 BEATS
QRS COUNT: 11 BEATS
QRS COUNT: 11 BEATS
QRS DURATION: 140 MS
QRS DURATION: 140 MS
QRS DURATION: 144 MS
QRS DURATION: 170 MS
QT INTERVAL: 432 MS
QT INTERVAL: 436 MS
QT INTERVAL: 436 MS
QT INTERVAL: 452 MS
QTC CALCULATION(BAZETT): 442 MS
QTC CALCULATION(BAZETT): 449 MS
QTC CALCULATION(BAZETT): 449 MS
QTC CALCULATION(BAZETT): 452 MS
QTC FREDERICIA: 441 MS
QTC FREDERICIA: 443 MS
QTC FREDERICIA: 445 MS
QTC FREDERICIA: 452 MS
R AXIS: -21 DEGREES
R AXIS: -22 DEGREES
R AXIS: 100 DEGREES
R AXIS: 133 DEGREES
RBC # BLD AUTO: 2.63 X10*6/UL (ref 4.5–5.9)
SODIUM SERPL-SCNC: 139 MMOL/L (ref 136–145)
SODIUM SERPL-SCNC: 140 MMOL/L (ref 136–145)
SODIUM SERPL-SCNC: 141 MMOL/L (ref 136–145)
T AXIS: -6 DEGREES
T AXIS: 32 DEGREES
T AXIS: 59 DEGREES
T AXIS: 7 DEGREES
T OFFSET: 430 MS
T OFFSET: 434 MS
T OFFSET: 436 MS
T OFFSET: 440 MS
TIBC SERPL-MCNC: 189 UG/DL (ref 240–445)
UIBC SERPL-MCNC: 160 UG/DL (ref 110–370)
VENTRICULAR RATE: 60 BPM
VENTRICULAR RATE: 62 BPM
VENTRICULAR RATE: 64 BPM
VENTRICULAR RATE: 65 BPM
VIT B12 SERPL-MCNC: 316 PG/ML (ref 211–911)
WBC # BLD AUTO: 2.3 X10*3/UL (ref 4.4–11.3)

## 2024-03-12 PROCEDURE — RXMED WILLOW AMBULATORY MEDICATION CHARGE

## 2024-03-12 PROCEDURE — 2500000001 HC RX 250 WO HCPCS SELF ADMINISTERED DRUGS (ALT 637 FOR MEDICARE OP)

## 2024-03-12 PROCEDURE — 93010 ELECTROCARDIOGRAM REPORT: CPT | Performed by: INTERNAL MEDICINE

## 2024-03-12 PROCEDURE — 2500000004 HC RX 250 GENERAL PHARMACY W/ HCPCS (ALT 636 FOR OP/ED)

## 2024-03-12 PROCEDURE — 83735 ASSAY OF MAGNESIUM: CPT

## 2024-03-12 PROCEDURE — 2500000005 HC RX 250 GENERAL PHARMACY W/O HCPCS

## 2024-03-12 PROCEDURE — 85025 COMPLETE CBC W/AUTO DIFF WBC: CPT

## 2024-03-12 PROCEDURE — 2500000002 HC RX 250 W HCPCS SELF ADMINISTERED DRUGS (ALT 637 FOR MEDICARE OP, ALT 636 FOR OP/ED)

## 2024-03-12 PROCEDURE — 36415 COLL VENOUS BLD VENIPUNCTURE: CPT | Performed by: STUDENT IN AN ORGANIZED HEALTH CARE EDUCATION/TRAINING PROGRAM

## 2024-03-12 PROCEDURE — 82746 ASSAY OF FOLIC ACID SERUM: CPT | Performed by: STUDENT IN AN ORGANIZED HEALTH CARE EDUCATION/TRAINING PROGRAM

## 2024-03-12 PROCEDURE — 36415 COLL VENOUS BLD VENIPUNCTURE: CPT

## 2024-03-12 PROCEDURE — 80069 RENAL FUNCTION PANEL: CPT | Performed by: STUDENT IN AN ORGANIZED HEALTH CARE EDUCATION/TRAINING PROGRAM

## 2024-03-12 PROCEDURE — 83735 ASSAY OF MAGNESIUM: CPT | Performed by: STUDENT IN AN ORGANIZED HEALTH CARE EDUCATION/TRAINING PROGRAM

## 2024-03-12 PROCEDURE — 93005 ELECTROCARDIOGRAM TRACING: CPT

## 2024-03-12 PROCEDURE — 84100 ASSAY OF PHOSPHORUS: CPT

## 2024-03-12 PROCEDURE — 99233 SBSQ HOSP IP/OBS HIGH 50: CPT | Performed by: HOSPITALIST

## 2024-03-12 PROCEDURE — 2500000004 HC RX 250 GENERAL PHARMACY W/ HCPCS (ALT 636 FOR OP/ED): Mod: JZ

## 2024-03-12 PROCEDURE — 99232 SBSQ HOSP IP/OBS MODERATE 35: CPT | Performed by: INTERNAL MEDICINE

## 2024-03-12 PROCEDURE — 1100000001 HC PRIVATE ROOM DAILY

## 2024-03-12 PROCEDURE — 82607 VITAMIN B-12: CPT | Performed by: STUDENT IN AN ORGANIZED HEALTH CARE EDUCATION/TRAINING PROGRAM

## 2024-03-12 PROCEDURE — 87799 DETECT AGENT NOS DNA QUANT: CPT | Performed by: STUDENT IN AN ORGANIZED HEALTH CARE EDUCATION/TRAINING PROGRAM

## 2024-03-12 PROCEDURE — 82306 VITAMIN D 25 HYDROXY: CPT | Performed by: STUDENT IN AN ORGANIZED HEALTH CARE EDUCATION/TRAINING PROGRAM

## 2024-03-12 PROCEDURE — 82947 ASSAY GLUCOSE BLOOD QUANT: CPT

## 2024-03-12 RX ORDER — DEXTROSE MONOHYDRATE 100 MG/ML
50 INJECTION, SOLUTION INTRAVENOUS CONTINUOUS
Status: DISPENSED | OUTPATIENT
Start: 2024-03-12 | End: 2024-03-12

## 2024-03-12 RX ORDER — SULFAMETHOXAZOLE AND TRIMETHOPRIM 400; 80 MG/1; MG/1
1 TABLET ORAL DAILY
Status: DISCONTINUED | OUTPATIENT
Start: 2024-03-13 | End: 2024-03-16 | Stop reason: HOSPADM

## 2024-03-12 RX ORDER — POLYETHYLENE GLYCOL 3350 17 G/17G
17 POWDER, FOR SOLUTION ORAL DAILY
Status: DISCONTINUED | OUTPATIENT
Start: 2024-03-12 | End: 2024-03-16 | Stop reason: HOSPADM

## 2024-03-12 RX ORDER — PEN NEEDLE, DIABETIC 30 GX3/16"
1 NEEDLE, DISPOSABLE MISCELLANEOUS 4 TIMES DAILY
Qty: 400 EACH | Refills: 3 | Status: SHIPPED | OUTPATIENT
Start: 2024-03-12 | End: 2025-03-12

## 2024-03-12 RX ORDER — DEXTROSE 50 % IN WATER (D50W) INTRAVENOUS SYRINGE
25 ONCE
Status: COMPLETED | OUTPATIENT
Start: 2024-03-12 | End: 2024-03-12

## 2024-03-12 RX ORDER — BUMETANIDE 0.25 MG/ML
2 INJECTION INTRAMUSCULAR; INTRAVENOUS 3 TIMES DAILY
Status: COMPLETED | OUTPATIENT
Start: 2024-03-12 | End: 2024-03-14

## 2024-03-12 RX ORDER — ACETAMINOPHEN 325 MG/1
975 TABLET ORAL EVERY 8 HOURS PRN
Status: DISCONTINUED | OUTPATIENT
Start: 2024-03-12 | End: 2024-03-16

## 2024-03-12 RX ORDER — CALCIUM GLUCONATE 20 MG/ML
2 INJECTION, SOLUTION INTRAVENOUS ONCE
Status: COMPLETED | OUTPATIENT
Start: 2024-03-12 | End: 2024-03-12

## 2024-03-12 RX ADMIN — BUMETANIDE 2 MG: 0.25 INJECTION INTRAMUSCULAR; INTRAVENOUS at 15:46

## 2024-03-12 RX ADMIN — ACETAMINOPHEN 975 MG: 325 TABLET ORAL at 20:54

## 2024-03-12 RX ADMIN — NIFEDIPINE 60 MG: 60 TABLET, FILM COATED, EXTENDED RELEASE ORAL at 20:10

## 2024-03-12 RX ADMIN — BUMETANIDE 1 MG/HR: 0.25 INJECTION INTRAMUSCULAR; INTRAVENOUS at 13:00

## 2024-03-12 RX ADMIN — DEXTROSE MONOHYDRATE 50 ML/HR: 100 INJECTION, SOLUTION INTRAVENOUS at 04:58

## 2024-03-12 RX ADMIN — AZATHIOPRINE 25 MG: 50 TABLET ORAL at 08:48

## 2024-03-12 RX ADMIN — INSULIN LISPRO 2 UNITS: 100 INJECTION, SOLUTION INTRAVENOUS; SUBCUTANEOUS at 18:24

## 2024-03-12 RX ADMIN — TACROLIMUS 1.5 MG: 0.5 CAPSULE ORAL at 05:59

## 2024-03-12 RX ADMIN — POLYETHYLENE GLYCOL 3350 17 G: 17 POWDER, FOR SOLUTION ORAL at 13:00

## 2024-03-12 RX ADMIN — PANTOPRAZOLE SODIUM 40 MG: 40 TABLET, DELAYED RELEASE ORAL at 06:02

## 2024-03-12 RX ADMIN — BUMETANIDE 2 MG: 0.25 INJECTION INTRAMUSCULAR; INTRAVENOUS at 20:11

## 2024-03-12 RX ADMIN — DEXTROSE MONOHYDRATE 25 G: 25 INJECTION, SOLUTION INTRAVENOUS at 04:40

## 2024-03-12 RX ADMIN — HEPARIN SODIUM 5000 UNITS: 5000 INJECTION INTRAVENOUS; SUBCUTANEOUS at 01:01

## 2024-03-12 RX ADMIN — CALCITRIOL CAPSULES 0.25 MCG 0.25 MCG: 0.25 CAPSULE ORAL at 08:48

## 2024-03-12 RX ADMIN — PREDNISONE 5 MG: 5 TABLET ORAL at 08:48

## 2024-03-12 RX ADMIN — PRAVASTATIN SODIUM 10 MG: 20 TABLET ORAL at 20:11

## 2024-03-12 RX ADMIN — CINACALCET 30 MG: 30 TABLET, FILM COATED ORAL at 08:48

## 2024-03-12 RX ADMIN — INSULIN LISPRO 2 UNITS: 100 INJECTION, SOLUTION INTRAVENOUS; SUBCUTANEOUS at 13:01

## 2024-03-12 RX ADMIN — CARVEDILOL 37.5 MG: 25 TABLET, FILM COATED ORAL at 20:11

## 2024-03-12 RX ADMIN — SULFAMETHOXAZOLE AND TRIMETHOPRIM 1 TABLET: 400; 80 TABLET ORAL at 08:48

## 2024-03-12 RX ADMIN — Medication 1000 UNITS: at 08:48

## 2024-03-12 RX ADMIN — CARVEDILOL 37.5 MG: 25 TABLET, FILM COATED ORAL at 08:48

## 2024-03-12 RX ADMIN — ASPIRIN 81 MG 81 MG: 81 TABLET ORAL at 08:48

## 2024-03-12 RX ADMIN — SODIUM ZIRCONIUM CYCLOSILICATE 5 G: 5 POWDER, FOR SUSPENSION ORAL at 03:18

## 2024-03-12 RX ADMIN — INSULIN HUMAN 10 UNITS: 100 INJECTION, SOLUTION PARENTERAL at 04:39

## 2024-03-12 RX ADMIN — ISOSORBIDE MONONITRATE 60 MG: 60 TABLET, EXTENDED RELEASE ORAL at 08:48

## 2024-03-12 RX ADMIN — NIFEDIPINE 60 MG: 60 TABLET, FILM COATED, EXTENDED RELEASE ORAL at 08:48

## 2024-03-12 RX ADMIN — HEPARIN SODIUM 5000 UNITS: 5000 INJECTION INTRAVENOUS; SUBCUTANEOUS at 15:47

## 2024-03-12 RX ADMIN — Medication 1 TABLET: at 08:48

## 2024-03-12 RX ADMIN — CALCIUM GLUCONATE 2 G: 20 INJECTION, SOLUTION INTRAVENOUS at 04:42

## 2024-03-12 RX ADMIN — HEPARIN SODIUM 5000 UNITS: 5000 INJECTION INTRAVENOUS; SUBCUTANEOUS at 08:48

## 2024-03-12 RX ADMIN — TACROLIMUS 1.5 MG: 0.5 CAPSULE ORAL at 18:24

## 2024-03-12 ASSESSMENT — COGNITIVE AND FUNCTIONAL STATUS - GENERAL
DAILY ACTIVITIY SCORE: 24
DAILY ACTIVITIY SCORE: 24
MOBILITY SCORE: 21
STANDING UP FROM CHAIR USING ARMS: A LITTLE
CLIMB 3 TO 5 STEPS WITH RAILING: A LITTLE
WALKING IN HOSPITAL ROOM: A LITTLE
CLIMB 3 TO 5 STEPS WITH RAILING: A LITTLE
STANDING UP FROM CHAIR USING ARMS: A LITTLE
MOBILITY SCORE: 21
WALKING IN HOSPITAL ROOM: A LITTLE

## 2024-03-12 ASSESSMENT — PAIN SCALES - GENERAL
PAINLEVEL_OUTOF10: 0 - NO PAIN
PAINLEVEL_OUTOF10: 4

## 2024-03-12 ASSESSMENT — PAIN - FUNCTIONAL ASSESSMENT
PAIN_FUNCTIONAL_ASSESSMENT: 0-10
PAIN_FUNCTIONAL_ASSESSMENT: 0-10

## 2024-03-12 ASSESSMENT — ACTIVITIES OF DAILY LIVING (ADL): LACK_OF_TRANSPORTATION: NO

## 2024-03-12 NOTE — CARE PLAN
The patient's goals for the shift include comfort    The clinical goals for the shift include Patient will report no SOB by end of shift    Bumex continuous IV stopped; 2mg to be given 3 times a day. Blood glucose in the 200s. NO adverse events.        Problem: Pain  Goal: My pain/discomfort is manageable  Outcome: Progressing     Problem: Safety  Goal: Patient will be injury free during hospitalization  Outcome: Progressing  Goal: I will remain free of falls  Outcome: Progressing     Problem: Daily Care  Goal: Daily care needs are met  3/12/2024 1855 by Elodia Liu RN  Outcome: Progressing  3/12/2024 1136 by Elodia Liu RN  Flowsheets (Taken 3/12/2024 1136)  Daily care needs are met: Assess and monitor ability to perform self care and identify potential discharge needs     Problem: Psychosocial Needs  Goal: Demonstrates ability to cope with hospitalization/illness  Outcome: Progressing  Goal: Collaborate with me, my family, and caregiver to identify my specific goals  Outcome: Progressing     Problem: Discharge Barriers  Goal: My discharge needs are met  Outcome: Progressing     Problem: Diabetes  Goal: Achieve decreasing blood glucose levels by end of shift  Outcome: Progressing  Goal: Increase stability of blood glucose readings by end of shift  Outcome: Progressing  Goal: Decrease in ketones present in urine by end of shift  Outcome: Progressing  Goal: Maintain electrolyte levels within acceptable range throughout shift  Outcome: Progressing  Goal: Maintain glucose levels >70mg/dl to <250mg/dl throughout shift  Outcome: Progressing  Goal: No changes in neurological exam by end of shift  Outcome: Progressing  Goal: Learn about and adhere to nutrition recommendations by end of shift  Outcome: Progressing  Goal: Vital signs within normal range for age by end of shift  Outcome: Progressing  Goal: Increase self care and/or family involovement by end of shift  Outcome: Progressing  Goal: Receive DSME  education by end of shift  Outcome: Progressing     Problem: Pain - Adult  Goal: Verbalizes/displays adequate comfort level or baseline comfort level  Outcome: Progressing     Problem: Safety - Adult  Goal: Free from fall injury  Outcome: Progressing     Problem: Discharge Planning  Goal: Discharge to home or other facility with appropriate resources  Outcome: Progressing     Problem: Chronic Conditions and Co-morbidities  Goal: Patient's chronic conditions and co-morbidity symptoms are monitored and maintained or improved  Outcome: Progressing

## 2024-03-12 NOTE — PROGRESS NOTES
03/12/24 1247   Discharge Planning   Living Arrangements Children  (Home with dtr Rylee)   Support Systems Children   Assistance Needed None   Type of Residence Private residence   Do you have animals or pets at home? No   Who is requesting discharge planning? Patient   Home or Post Acute Services In home services  (nurse visit twice monthly (checks vital signs).)   Type of Home Care Services Home nursing visits   Patient expects to be discharged to: Home with resumed HC for RN   Does the patient need discharge transport arranged? No  (Pt will call for a ride.)   Financial Resource Strain   How hard is it for you to pay for the very basics like food, housing, medical care, and heating? Not hard   Housing Stability   In the last 12 months, was there a time when you were not able to pay the mortgage or rent on time? N   In the last 12 months, was there a time when you did not have a steady place to sleep or slept in a shelter (including now)? N   Transportation Needs   In the past 12 months, has lack of transportation kept you from medical appointments or from getting medications? no   In the past 12 months, has lack of transportation kept you from meetings, work, or from getting things needed for daily living? No   Patient Choice   Patient / Family choosing to utilize agency / facility established prior to hospitalization Yes     Assessment Note:  Met with pt and introduced myself as care coordinator and member of the Care Transitions team for discharge planning.   Pt feels safe at home with dtr.  Pt arranges for rides to drs appts.  Pt's address, phone number and contact information was verified.  Pt states he may need to start dialysis again.  Pt does not have any questions/concerns at this time.     Previous Home Care: Pt states a nurse comes out 2x/month (checks his vital signs) from his insurance. Pt has recently had outpatient PT visits (last appt was 3/7).  DME: Glucometer, BP cuff  Pharmacy: [facility]  Falls: Denies  PCP:   GIANCARLO Hutton (last visit 3 wks ago). Nephrologist is  Dr. Zhang.    Linh Bueno MSN, RN-BC  Transitional Care Coordinator (TCC)  [phone]

## 2024-03-12 NOTE — NURSING NOTE
Geriatric Nursing rounds summary  Mr Bashir is a 68 year old admitted fluid overload sob  Age friendly  What matters full code slept poorly last night  Mobility able to dangle  Mentation cam neg  Meds bumex carvedil insulin

## 2024-03-12 NOTE — PROGRESS NOTES
Subjective   Manolo Bashir is a 68 y.o. male on hospital day 1 reports he has had some good urine response from the diuretics but did not sleep well.    Objective     Exam     Vitals:    03/11/24 2036 03/11/24 2124 03/12/24 0526 03/12/24 0848   BP:  145/57 160/59 171/77   BP Location:       Patient Position:       Pulse: 64 63 60 68   Resp: 16 18 16    Temp:  36.5 °C (97.7 °F) 36.4 °C (97.6 °F)    TempSrc:       SpO2: 95% 95% 99%    Weight:       Height:          Intake/Output last 3 shifts:  I/O last 3 completed shifts:  In: 26.7 (0.3 mL/kg) [I.V.:26.7 (0.3 mL/kg)]  Out: 2400 (30.4 mL/kg) [Urine:2400 (0.8 mL/kg/hr)]  Weight: 78.9 kg     Physical Exam  Vitals reviewed.   Constitutional:       General: He is not in acute distress.     Appearance: Normal appearance. He is not ill-appearing, toxic-appearing or diaphoretic.      Comments: Breathing smooth and calm.  Patient is alert and oriented x 3.   HENT:      Head: Normocephalic and atraumatic.   Neck:      Comments: No appreciable JVD  Cardiovascular:      Rate and Rhythm: Normal rate and regular rhythm.      Pulses: Normal pulses.      Heart sounds: Murmur (2 out of 6 systolic murmur heard greatest over the left upper sternal border) heard.      No friction rub. No gallop.   Pulmonary:      Effort: Pulmonary effort is normal.      Breath sounds: No wheezing, rhonchi or rales.      Comments: Breath sounds diminished over right side.  I did not appreciate crackles  Abdominal:      General: Bowel sounds are normal. There is no distension.      Palpations: Abdomen is soft.      Tenderness: There is no abdominal tenderness. There is no guarding or rebound.   Musculoskeletal:      Cervical back: No rigidity.      Right lower leg: Edema present.      Left lower leg: Edema present.      Comments: Trace-1+ bilateral pitting edema in bilateral lower extremities.  Overall appears improved.  Right arm fistula with pulse but no appreciable thrill or bruit   Lymphadenopathy:       Cervical: No cervical adenopathy.   Skin:     General: Skin is warm and dry.   Neurological:      General: No focal deficit present.      Mental Status: He is alert and oriented to person, place, and time.      Comments: No asterixis noted   Psychiatric:         Mood and Affect: Mood normal.         Behavior: Behavior normal.            Medications   aspirin, 81 mg, oral, Daily  azaTHIOprine, 25 mg, oral, Daily  bumetanide, 2 mg, intravenous, TID  calcitriol, 0.25 mcg, oral, Daily  carvedilol, 37.5 mg, oral, BID  cholecalciferol, 1,000 Units, oral, Daily  cinacalcet, 30 mg, oral, Daily  heparin (porcine), 5,000 Units, subcutaneous, q8h  [Held by provider] hydrALAZINE, 100 mg, oral, TID  insulin lispro, 0-5 Units, subcutaneous, TID with meals  [START ON 3/13/2024] iron sucrose, 200 mg, intravenous, Every other day  isosorbide mononitrate ER, 60 mg, oral, Daily  multivitamin with minerals, 1 tablet, oral, Daily  NIFEdipine ER, 60 mg, oral, BID  pantoprazole, 40 mg, oral, Daily before breakfast  polyethylene glycol, 17 g, oral, Daily  pravastatin, 10 mg, oral, Nightly  predniSONE, 5 mg, oral, Daily  [START ON 3/13/2024] sulfamethoxazole-trimethoprim, 1 tablet, oral, Daily  tacrolimus, 1.5 mg, oral, q12h ZOË       PRN medications: dextrose 10 % in water (D10W), dextrose, docusate sodium, glucagon, ondansetron       Labs     All new labs reviewed:  some of the basic labs as follows -     Results from last 7 days   Lab Units 03/12/24  0814 03/11/24  0431 03/07/24  1202   WBC AUTO x10*3/uL 2.3* 2.5* 2.3*   HEMOGLOBIN g/dL 7.5* 8.0* 8.3*   HEMATOCRIT % 24.0* 23.1* 26.5*   PLATELETS AUTO x10*3/uL 94* 88* 114*   NEUTROS PCT AUTO % 41.6 56.5  --    LYMPHS PCT AUTO % 33.3 23.0  --    MONOS PCT AUTO % 17.7 14.9  --    EOS PCT AUTO % 5.6 2.4  --             Results from last 72 hours   Lab Units 03/12/24  0814 03/11/24  2356 03/11/24  0431   SODIUM mmol/L 141 139 142   POTASSIUM mmol/L 4.4 5.9* 5.6*   CHLORIDE mmol/L 106 107  "109*   CO2 mmol/L 28 26 26   BUN mg/dL 59* 57* 56*   CREATININE mg/dL 5.30* 5.21* 5.08*       Results from last 72 hours   Lab Units 03/12/24  0814 03/11/24 2356 03/11/24  0431   ALK PHOS U/L  --   --  47   AST U/L  --   --  45*   ALT U/L  --   --  44   BILIRUBIN TOTAL mg/dL  --   --  0.4   ALBUMIN g/dL 3.0* 3.1* 3.4   PROTEIN TOTAL g/dL  --   --  6.0*   LIPASE U/L  --   --  9       Results from last 72 hours   Lab Units 03/12/24  0814 03/11/24 2356 03/11/24  0431   GLUCOSE mg/dL 98 242* 169*           No results found for: \"TR1\"  Lab Results   Component Value Date    URINECULTURE CANCELED 07/19/2023    BLOODCULT No growth at 4 days -  FINAL REPORT 10/19/2023    BLOODCULT No growth at 4 days -  FINAL REPORT 10/19/2023    BLOODCULT  09/19/2023     No Growth at 1 days~No Growth at 2 days~No Growth at 3 days~NO GROWTH at 4 days - FINAL REPORT    BLOODCULT  09/19/2023     No Growth at 1 days~No Growth at 2 days~No Growth at 3 days~NO GROWTH at 4 days - FINAL REPORT            Imaging   ECG 12 lead  Atrial flutter with 4:1 AV conduction  Right axis deviation  Nonspecific intraventricular block  Abnormal ECG  When compared with ECG of 20-JAN-2024 04:29,  Atrial flutter has replaced Sinus rhythm  QRS duration has increased  See ED provider note for full interpretation and clinical correlation  Confirmed by Adi Wagner ([id number]) on 3/12/2024 10:22:31 AM  ECG 12 Lead  Normal sinus rhythm  Rightward axis  Nonspecific intraventricular block  Cannot rule out Septal infarct , age undetermined  Abnormal ECG  When compared with ECG of 11-MAR-2024 03:10,  Sinus rhythm has replaced Atrial flutter  QRS duration has decreased  Inverted T waves have replaced nonspecific T wave abnormality in Inferior leads  See ED provider note for full interpretation and clinical correlation  Confirmed by Adi Wganer ([id number]) on 3/12/2024 10:22:20 AM  ECG 12 Lead  Normal sinus rhythm  Nonspecific intraventricular block  Cannot rule out Septal " infarct (cited on or before 11-MAR-2024)  Abnormal ECG  When compared with ECG of 11-MAR-2024 04:24,  QRS duration has increased  ST no longer depressed in Inferior leads  ST no longer depressed in Anterolateral leads  ECG 12 Lead  Normal sinus rhythm  Left bundle branch block  Abnormal ECG  When compared with ECG of 11-MAR-2024 04:24,  Left bundle branch block is now Present  Criteria for Septal infarct are no longer Present     No results found for this or any previous visit from the past 1095 days.     Encounter Date: 03/11/24   ECG 12 Lead   Result Value    Ventricular Rate 65    Atrial Rate 65    AK Interval 174    QRS Duration 144    QT Interval 432    QTC Calculation(Bazett) 449    P Axis 69    R Axis -21    T Axis 32    QRS Count 11    Q Onset 214    P Onset 127    P Offset 160    T Offset 430    QTC Fredericia 443    Narrative    Normal sinus rhythm  Left bundle branch block  Abnormal ECG  When compared with ECG of 11-MAR-2024 04:24,  Left bundle branch block is now Present  Criteria for Septal infarct are no longer Present        Assessment and Plan     Shortness of breath: Likely related to worsening volume overload in the setting of declining renal function.  Patient is slated for outpatient dialysis fistula placement later this month.  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  Recent kappa/lambda ratio was normal despite individuals being elevated.  -Continue diuresis utilizing Bumex.  Can try to downgrade and monitor response to Bumex by mouth  -Continue strict ins and outs and daily weights.  -For now we will continue home immunosuppression regimen (tacrolimus, azathioprine, and prednisone).  Will adjust dosing with the guidance of the transplant service  -Checking ultrasound of right arm graft to see viability for future use  -Continue home Sensipar and vitamin D3/calcitriol  -Trend renal function panel.  Will continue Lokelma as needed  -Monitor postvoid  residuals  -Check PTH and vitamin D levels    Cardiovascular: Blood pressure rising in the emergency room to the point is now elevated.  EKG without ischemic change and troponin is negative x 2.  Recent echocardiogram from 11/23 showed preserved EF with severe LVH, mild to moderate aortic regurgitation  -Continue home carvedilol, nifedipine, and Imdur  -Use hydralazine as needed.  May need to start standing dose lower than what patient was taking as outpatient which was 100 mg 3 times daily  -Continue statin and aspirin  -May need to repeat echocardiogram to assess for any further worsening of valvular dysfunction.  Overall at this presentation seems more like it is related to decreased urine output from poor renal function    Pancytopenia:  -Check iron studies, ferritin, reticulocyte count, B12, and folate    Diabetes mellitus: A1c from 1/24 is 7.7  -Continue sliding scale insulin    GI:  -Continue PPI  -Bowel care    DVT prophylaxis    Physical therapy/Occupational Therapy when appropriate    Daniel Peña MD     Of note the above was done with Dragon dictation system.  Note was proofread to minimize errors.

## 2024-03-12 NOTE — PROGRESS NOTES
Transplant Nephrology progress note     Date of admission: 3/11/2024     Manolo Bashir is a 68 y.o.  with [facility]   Past Medical History:   Diagnosis Date    Anemia     Arthritis     Cataract     Chronic kidney disease, stage 3 unspecified (CMS/HCC) 09/26/2018    Stage 3 chronic kidney disease    CKD (chronic kidney disease)     stage V    COVID-19 06/18/2020    COVID-19 virus infection    Diabetes (CMS/[facility])     ESRD (end stage renal disease) (CMS/[facility])     Focal and segmental proliferative glomerulonephritis 12/23/2023    HTN (hypertension)     Hyperlipidemia     Other long term (current) drug therapy 07/20/2021    High risk medication use    Personal history of other diseases of the circulatory system     Personal history of cardiac murmur    Personal history of other infectious and parasitic diseases 08/17/2015    History of hepatitis    Polyp, colonic 08/17/2023    Primary osteoarthritis of both ankles 08/17/2023    Prostate cancer (CMS/[facility])     Tubular adenoma of colon 08/17/2023    Unspecified kidney failure 08/17/2016    Renal failure        SUBJECTIVE:          PROBLEM LIST:  Principal Problem:    Hypervolemia, unspecified hypervolemia type         ALLERGIES:  No Known Allergies         CURRENT MEDICATIONS:  Scheduled medications  aspirin, 81 mg, oral, Daily  azaTHIOprine, 25 mg, oral, Daily  bumetanide, 2 mg, intravenous, TID  calcitriol, 0.25 mcg, oral, Daily  carvedilol, 37.5 mg, oral, BID  cholecalciferol, 1,000 Units, oral, Daily  cinacalcet, 30 mg, oral, Daily  heparin (porcine), 5,000 Units, subcutaneous, q8h  [Held by provider] hydrALAZINE, 100 mg, oral, TID  insulin lispro, 0-5 Units, subcutaneous, TID with meals  isosorbide mononitrate ER, 60 mg, oral, Daily  multivitamin with minerals, 1 tablet, oral, Daily  NIFEdipine ER, 60 mg, oral, BID  pantoprazole, 40 mg, oral, Daily before breakfast  polyethylene glycol, 17 g, oral, Daily  pravastatin, 10 mg, oral, Nightly  predniSONE, 5 mg, oral, Daily  [START  "ON 3/13/2024] sulfamethoxazole-trimethoprim, 1 tablet, oral, Daily  tacrolimus, 1.5 mg, oral, q12h ZOË      Continuous medications     PRN medications  PRN medications: dextrose 10 % in water (D10W), dextrose, docusate sodium, glucagon, ondansetron       OBJECTIVE:    VITALS: Visit Vitals  /77   Pulse 68   Temp 36.4 °C (97.6 °F)   Resp 16   Ht 1.727 m (5' 8\")   Wt 78.9 kg (174 lb)   SpO2 99%   BMI 26.46 kg/m²   Smoking Status Never   BSA 1.95 m²        General: No distress   Mucosa moist   AI, AC, AF     HEENT: PEERLA  CVS: S1 S2 no murmurs  RESP:  Lungs clear to auscultation   ABDO: Soft, non-tender   Neuro: A + O x 3  Skin: No rash   Extremities: No edema       LABS:  Results from last 72 hours   Lab Units 03/12/24  0814   WBC AUTO x10*3/uL 2.3*   HEMOGLOBIN g/dL 7.5*   MCV fL 91   PLATELETS AUTO x10*3/uL 94*   BUN mg/dL 59*   CREATININE mg/dL 5.30*   CALCIUM mg/dL 9.8            Intake/Output Summary (Last 24 hours) at 3/12/2024 1310  Last data filed at 3/12/2024 1135  Gross per 24 hour   Intake 319.34 ml   Output 2400 ml   Net -2080.66 ml          ASSESSMENT AND PLAN:     Manolo Bashir is a 68 y.o.M with PMH ESRD (on HD [id number]), s/p 2x renal transplants (1992, 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    -Patient completed 2 doses of rituximab total of 2 g -1 /7/2024.  -Recent hospital admission as of 1/16/2024 with hypertensive urgency with fluid overload.  -Patient currently got admitted for volume overload with " shortness of breath.  Transplant nephrology consulted for further management.     Allograft function:  --Patient responded well with her diuretics.  Please consider changing Bumex to oral.-No acute indication for dialysis at this time we will continue conservative management for now.  -Electrolytes reviewed are within normal range.  -Right upper extremity AV fistula which is having good thrill but please consider ultrasound to evaluate for cannulation.  -Avoid nephrotoxins and renally dose medications.     Immunosuppression: Continue with the azathioprine, prednisone 5 mg, tacrolimus 1.5 twice daily.  Levels recently is around 2.8-3 please consider checking trough levels and will adjust her dose accordingly.     Hemodynamics: Blood pressures today is optimal.  Continue with the carvedilol, Imdur, nifedipine.     Bone mineral disease: Calcium and phosphorus levels are optimal continue with the current management.  Please consider checking PTH and vitamin D levels.     Anemia leukopenia: Agree with IV iron.  Patient seems to be leukopenic please check EBV PCR plasma and CMV PCR blood.  Pls consider checking flow cytometry blood.          Thank you for consulting .  Dina Benoit MD       Notes created by Antoine -Please excuse the Typos .

## 2024-03-12 NOTE — PROGRESS NOTES
"Manolo Bashir is a 68 y.o. male on day 1 with a past medical history of ESRD s/p renal transplant x 2 in 1992 in 2013 on immunosuppression, MGUS, pancytopenia, diabetes, hypertension, prostate cancer s/p radical prostatectomy, HCV fully treated with RNA not detected 10/18/2023 of admission presenting with Hypervolemia, unspecified hypervolemia type.    Subjective   No acute events overnight. Patient endorses breathing much better. Tolerating laying down with slight head elevation. Denies CP/SOB, abdominal pain, constipation, diarrhea, or new rashes. Voiding well and endorsing a good appetite.        Objective   Last Recorded Vitals  Blood pressure 171/77, pulse 68, temperature 36.4 °C (97.6 °F), resp. rate 16, height 1.727 m (5' 8\"), weight 78.9 kg (174 lb), SpO2 99 %.  Intake/Output last 3 Shifts:  I/O last 3 completed shifts:  In: 26.7 (0.3 mL/kg) [I.V.:26.7 (0.3 mL/kg)]  Out: 2400 (30.4 mL/kg) [Urine:2400 (0.8 mL/kg/hr)]  Weight: 78.9 kg     Physical Exam  Constitutional:       Appearance: He is not toxic-appearing.   Eyes:      Conjunctiva/sclera: Conjunctivae normal.      Pupils: Pupils are equal, round, and reactive to light.   Cardiovascular:      Rate and Rhythm: Normal rate and regular rhythm.      Heart sounds: Murmur heard.      Comments: Systolic murmur best appreciated at left sternal border.  Pulmonary:      Effort: Pulmonary effort is normal.      Breath sounds: Rales present.      Comments: Decreased basilar breath sounds bilaterally. Some inspiratory rales present.  Abdominal:      General: Abdomen is flat. Bowel sounds are normal. There is no distension.      Palpations: Abdomen is soft.   Musculoskeletal:      Cervical back: Normal range of motion.      Right lower leg: No edema.      Left lower leg: No edema.   Skin:     General: Skin is warm.   Neurological:      General: No focal deficit present.      Mental Status: He is alert.   Psychiatric:         Mood and Affect: Mood normal.         " Behavior: Behavior normal.       Relevant Results    Scheduled medications  aspirin, 81 mg, oral, Daily  azaTHIOprine, 25 mg, oral, Daily  calcitriol, 0.25 mcg, oral, Daily  carvedilol, 37.5 mg, oral, BID  cholecalciferol, 1,000 Units, oral, Daily  cinacalcet, 30 mg, oral, Daily  heparin (porcine), 5,000 Units, subcutaneous, q8h  [Held by provider] hydrALAZINE, 100 mg, oral, TID  insulin lispro, 0-5 Units, subcutaneous, TID with meals  isosorbide mononitrate ER, 60 mg, oral, Daily  multivitamin with minerals, 1 tablet, oral, Daily  NIFEdipine ER, 60 mg, oral, BID  pantoprazole, 40 mg, oral, Daily before breakfast  polyethylene glycol, 17 g, oral, Daily  pravastatin, 10 mg, oral, Nightly  predniSONE, 5 mg, oral, Daily  sodium zirconium cyclosilicate, 10 g, oral, TID  [START ON 3/13/2024] sulfamethoxazole-trimethoprim, 1 tablet, oral, Daily  tacrolimus, 1.5 mg, oral, q12h ZOË      Continuous medications  bumetanide, 1 mg/hr, Last Rate: 1 mg/hr (03/12/24 1135)      PRN medications  PRN medications: dextrose 10 % in water (D10W), dextrose, docusate sodium, glucagon, ondansetron     Results from last 7 days   Lab Units 03/12/24  0814 03/11/24  0431 03/07/24  1202   WBC AUTO x10*3/uL 2.3* 2.5* 2.3*   HEMOGLOBIN g/dL 7.5* 8.0* 8.3*   HEMATOCRIT % 24.0* 23.1* 26.5*   PLATELETS AUTO x10*3/uL 94* 88* 114*     Results from last 7 days   Lab Units 03/12/24  0814 03/11/24  2356 03/11/24  0431   SODIUM mmol/L 141 139 142   POTASSIUM mmol/L 4.4 5.9* 5.6*   CO2 mmol/L 28 26 26   ANION GAP mmol/L 11 12 13   BUN mg/dL 59* 57* 56*   CREATININE mg/dL 5.30* 5.21* 5.08*   GLUCOSE mg/dL 98 242* 169*   EGFR mL/min/1.73m*2 11* 11* 12*   MAGNESIUM mg/dL 1.85 1.97 1.95   PHOSPHORUS mg/dL 2.9 2.6 2.6      Results from last 7 days   Lab Units 03/11/24  0431   ALT U/L 44   AST U/L 45*   ALK PHOS U/L 47          Results from last 7 days   Lab Units 03/11/24  0431   LIPASE U/L 9     Relevant Imaging  ECG 12 lead    Result Date: 3/12/2024  Atrial  flutter with 4:1 AV conduction Right axis deviation Nonspecific intraventricular block Abnormal ECG When compared with ECG of 20-JAN-2024 04:29, Atrial flutter has replaced Sinus rhythm QRS duration has increased See ED provider note for full interpretation and clinical correlation Confirmed by Adi Wagner ([id number]) on 3/12/2024 10:22:31 AM    ECG 12 Lead    Result Date: 3/12/2024  Normal sinus rhythm Rightward axis Nonspecific intraventricular block Cannot rule out Septal infarct , age undetermined Abnormal ECG When compared with ECG of 11-MAR-2024 03:10, Sinus rhythm has replaced Atrial flutter QRS duration has decreased Inverted T waves have replaced nonspecific T wave abnormality in Inferior leads See ED provider note for full interpretation and clinical correlation Confirmed by Adi Wagner ([id number]) on 3/12/2024 10:22:20 AM    ECG 12 Lead    Result Date: 3/12/2024  Normal sinus rhythm Nonspecific intraventricular block Cannot rule out Septal infarct (cited on or before 11-MAR-2024) Abnormal ECG When compared with ECG of 11-MAR-2024 04:24, QRS duration has increased ST no longer depressed in Inferior leads ST no longer depressed in Anterolateral leads    ECG 12 Lead    Result Date: 3/12/2024  Normal sinus rhythm Left bundle branch block Abnormal ECG When compared with ECG of 11-MAR-2024 04:24, Left bundle branch block is now Present Criteria for Septal infarct are no longer Present    XR chest 1 view    1. Extensive patchy airspace opacities throughout the right lung with obscuration of the right costophrenic angle. There are prominent perihilar interstitial markings bilaterally. Findings in the setting of cardiomegaly likely represent right pleural effusion and pulmonary edema. Probable small left pleural effusion also noted. Multifocal infection can not be excluded.   I personally reviewed the images/study and I agree with the findings as stated by resident physician Dr. Jeison Laws . This study  was interpreted at [facility], [place], [place].   MACRO: Critical Finding:  See findings. Notification was initiated on 3/11/2024 at 4:19 am by  Jeison Laws.  (**-OCF-**) Instructions:   Signed by: Octavio Henriquez 3/11/2024 5:17 AM Dictation workstation:   [id number]    Assessment and Plan:  Manolo Bashir is a 68-year-old male with a past medical history of ESRD s/p renal transplant x 2 in 1992 in 2013 on immunosuppression, MGUS, pancytopenia, diabetes, hypertension, prostate cancer s/p radical prostatectomy, HCV fully treated with RNA not detected 10/18/2023 presenting to the emergency department with shortness of breath.  CXR concerning for fluid overload with pleural effusions.  This is likely fluid overload in setting of CKD, excess salt water and discontinuation of hydralazine. Transplant nephrology following for possible initiation of dialysis.      Updates 3/12  - Transplant nephrology following, iron studies, B12, folate, EBV PCR plasma, CMV PCR blood  - IV Venofer 200mg every other day for 5 doses  - R upper extremity US to assess cannulation of AV fistula for dialysis access  - IV Bumex 2mg TID    #Fluid overload   #hypertension  :: TTE 11/18 showed EF 60 to 65%, left ventricular cavity moderately dilated, severe concentric left ventricular hypertrophy, mild to moderate aortic valve regurgitation  - Hold home Hydralazine 25 mg PO TID  - Continue home Amlodipine 10 mg PO daily  - Continue home Carvedilol 37.5mg PO BID   - Continue home Imdur 60 mg  - Bumex 2 mg TID  - Strict I's and O's  - Daily weights  - Monitor need for Cedeño placement given prostatectomy history    #Anemia   #Leukopenia  ::3/12 Hgb at 7.5; baseline ~10. WBC 2.3   - Transplant consulted, recommended iron studies, B12, folate, EBV PCR plasma, CMV PCR blood  - Order type and screen   >Consider transfusion if Hbg < 7 and symptomatic  - IV venofer 200mg every other day for 5 doses    #ESRD s/p  kidney transplant  :: impaired allograft function, CKD stage 4-5  - Continue home Tacorlimus 1.5mg BID; monitor trough levels  - Continue home Prednisone 5mg daily  - Continue home Imuran 25mg daily  - Continue home Bactrim  - Continue home Sensipar and vitamin D3/calcitriol   - Pending 3/12 R upper extremity US of AV fistula to assess cannulation     #hyperlipidemia  - Continue home pravastatin 40 mg nightly     #CAD  - Continue home ASA 81     #DM  - Low dose sliding scale     #Hx of Back pain  ::MRI 09/19/2023 showing nonspecific focus of abnormal signal in R pedicle of L4 c/w osseous hemangioma, stress fracture, or osteoid osteoma. No discitis or osteomyelitis. No evidence of metastasis.  -Tylenol 650 mg q8h PRN     F: PRN  E: PRN  N: Regular  A: PIV     Full Code  NOK: Amalia      MIHIR STEVENS MS3    Hu Porter MD  PGY1 Neurology

## 2024-03-12 NOTE — HOSPITAL COURSE
Manolo Bashir is a 68-year-old male with a past medical history of ESRD s/p renal transplant x 2 in 1992 in 2013 on immunosuppression, MGUS, pancytopenia, diabetes, hypertension, prostate cancer s/p radical prostatectomy, HCV fully treated with RNA not detected 10/18/2023 presenting to the emergency department with shortness of breath.     In the ED, chest x-ray showed signs of fluid overload.  Notably BNP was elevated to 598.  He received 4 mg Bumex x 1 with good urine output.  Transplant nephrology was consulted and recommended Bumex drip which was started at 2 mg/h.  Symptoms of shortness of breath improved.  Right upper extremity ultrasound was completed to evaluate patency of pre-existing fistula, and showed occlusion of R AVF.  EBV and CMV found to be negative.  IV iron was started.    Home hydralazine was resumed on 3/14 after persistently elevated blood pressures in 180s systolic.    New medications at discharge:  Hydralazine 100 mg 3 times daily  Bumex 2 mg twice daily    Follow-ups:  PCP, referral placed  Nephrology, referral placed, follow-up in 2 weeks  You will be called by the scheduling service to set up this appointment. If you do not hear from them within 3 days, please call [phone] to schedule the appointment.

## 2024-03-12 NOTE — CARE PLAN
Patient is calm an cooperative with care, no complaints of pain, Pulse ox above 90% without 02, resting comfortably.     The patient's goals for the shift include comfort    The clinical goals for the shift include pt will have no c/o of shortness of breath  pulse-ox >90% this shift    Over the shift, the patient did  make progress toward the following goals.   Problem: Safety  Goal: Patient will be injury free during hospitalization  Outcome: Progressing  Goal: I will remain free of falls  Outcome: Progressing

## 2024-03-13 ENCOUNTER — APPOINTMENT (OUTPATIENT)
Dept: TRANSPLANT | Facility: HOSPITAL | Age: 69
End: 2024-03-13
Payer: COMMERCIAL

## 2024-03-13 LAB
ALBUMIN SERPL BCP-MCNC: 3 G/DL (ref 3.4–5)
ANION GAP SERPL CALC-SCNC: 12 MMOL/L (ref 10–20)
BASOPHILS # BLD AUTO: 0.02 X10*3/UL (ref 0–0.1)
BASOPHILS NFR BLD AUTO: 1.1 %
BUN SERPL-MCNC: 61 MG/DL (ref 6–23)
CALCIUM SERPL-MCNC: 9.7 MG/DL (ref 8.6–10.6)
CHLORIDE SERPL-SCNC: 105 MMOL/L (ref 98–107)
CO2 SERPL-SCNC: 26 MMOL/L (ref 21–32)
CREAT SERPL-MCNC: 6.05 MG/DL (ref 0.5–1.3)
EBV DNA BLD NAA+PROBE-LOG IU: NORMAL {LOG_IU}/ML
EGFRCR SERPLBLD CKD-EPI 2021: 10 ML/MIN/1.73M*2
EOSINOPHIL # BLD AUTO: 0.08 X10*3/UL (ref 0–0.7)
EOSINOPHIL NFR BLD AUTO: 4.3 %
ERYTHROCYTE [DISTWIDTH] IN BLOOD BY AUTOMATED COUNT: 15.3 % (ref 11.5–14.5)
GLUCOSE BLD MANUAL STRIP-MCNC: 144 MG/DL (ref 74–99)
GLUCOSE BLD MANUAL STRIP-MCNC: 168 MG/DL (ref 74–99)
GLUCOSE BLD MANUAL STRIP-MCNC: 199 MG/DL (ref 74–99)
GLUCOSE BLD MANUAL STRIP-MCNC: 235 MG/DL (ref 74–99)
GLUCOSE BLD MANUAL STRIP-MCNC: 262 MG/DL (ref 74–99)
GLUCOSE SERPL-MCNC: 196 MG/DL (ref 74–99)
HCT VFR BLD AUTO: 26.4 % (ref 41–52)
HGB BLD-MCNC: 8.4 G/DL (ref 13.5–17.5)
IMM GRANULOCYTES # BLD AUTO: 0.03 X10*3/UL (ref 0–0.7)
IMM GRANULOCYTES NFR BLD AUTO: 1.6 % (ref 0–0.9)
LABORATORY COMMENT REPORT: NOT DETECTED
LYMPHOCYTES # BLD AUTO: 0.64 X10*3/UL (ref 1.2–4.8)
LYMPHOCYTES NFR BLD AUTO: 34.4 %
MAGNESIUM SERPL-MCNC: 1.78 MG/DL (ref 1.6–2.4)
MCH RBC QN AUTO: 28.8 PG (ref 26–34)
MCHC RBC AUTO-ENTMCNC: 31.8 G/DL (ref 32–36)
MCV RBC AUTO: 90 FL (ref 80–100)
MONOCYTES # BLD AUTO: 0.22 X10*3/UL (ref 0.1–1)
MONOCYTES NFR BLD AUTO: 11.8 %
NEUTROPHILS # BLD AUTO: 0.87 X10*3/UL (ref 1.2–7.7)
NEUTROPHILS NFR BLD AUTO: 46.8 %
NRBC BLD-RTO: 0 /100 WBCS (ref 0–0)
OVALOCYTES BLD QL SMEAR: NORMAL
PHOSPHATE SERPL-MCNC: 3.3 MG/DL (ref 2.5–4.9)
PLATELET # BLD AUTO: 92 X10*3/UL (ref 150–450)
POLYCHROMASIA BLD QL SMEAR: NORMAL
POTASSIUM SERPL-SCNC: 4.3 MMOL/L (ref 3.5–5.3)
RBC # BLD AUTO: 2.92 X10*6/UL (ref 4.5–5.9)
RBC MORPH BLD: NORMAL
SODIUM SERPL-SCNC: 139 MMOL/L (ref 136–145)
TACROLIMUS BLD-MCNC: 5.3 NG/ML
WBC # BLD AUTO: 1.9 X10*3/UL (ref 4.4–11.3)

## 2024-03-13 PROCEDURE — 2500000004 HC RX 250 GENERAL PHARMACY W/ HCPCS (ALT 636 FOR OP/ED)

## 2024-03-13 PROCEDURE — 80197 ASSAY OF TACROLIMUS: CPT | Performed by: STUDENT IN AN ORGANIZED HEALTH CARE EDUCATION/TRAINING PROGRAM

## 2024-03-13 PROCEDURE — 80069 RENAL FUNCTION PANEL: CPT

## 2024-03-13 PROCEDURE — 99232 SBSQ HOSP IP/OBS MODERATE 35: CPT | Performed by: INTERNAL MEDICINE

## 2024-03-13 PROCEDURE — 82947 ASSAY GLUCOSE BLOOD QUANT: CPT

## 2024-03-13 PROCEDURE — 2500000002 HC RX 250 W HCPCS SELF ADMINISTERED DRUGS (ALT 637 FOR MEDICARE OP, ALT 636 FOR OP/ED)

## 2024-03-13 PROCEDURE — 83735 ASSAY OF MAGNESIUM: CPT

## 2024-03-13 PROCEDURE — 36415 COLL VENOUS BLD VENIPUNCTURE: CPT | Performed by: STUDENT IN AN ORGANIZED HEALTH CARE EDUCATION/TRAINING PROGRAM

## 2024-03-13 PROCEDURE — 85025 COMPLETE CBC W/AUTO DIFF WBC: CPT

## 2024-03-13 PROCEDURE — 99233 SBSQ HOSP IP/OBS HIGH 50: CPT | Performed by: HOSPITALIST

## 2024-03-13 PROCEDURE — 2500000001 HC RX 250 WO HCPCS SELF ADMINISTERED DRUGS (ALT 637 FOR MEDICARE OP)

## 2024-03-13 PROCEDURE — 1100000001 HC PRIVATE ROOM DAILY

## 2024-03-13 PROCEDURE — 2500000002 HC RX 250 W HCPCS SELF ADMINISTERED DRUGS (ALT 637 FOR MEDICARE OP, ALT 636 FOR OP/ED): Performed by: STUDENT IN AN ORGANIZED HEALTH CARE EDUCATION/TRAINING PROGRAM

## 2024-03-13 RX ORDER — INSULIN GLARGINE 100 [IU]/ML
5 INJECTION, SOLUTION SUBCUTANEOUS EVERY 24 HOURS
Status: DISCONTINUED | OUTPATIENT
Start: 2024-03-13 | End: 2024-03-16 | Stop reason: HOSPADM

## 2024-03-13 RX ORDER — MAGNESIUM SULFATE HEPTAHYDRATE 40 MG/ML
2 INJECTION, SOLUTION INTRAVENOUS ONCE
Status: COMPLETED | OUTPATIENT
Start: 2024-03-13 | End: 2024-03-13

## 2024-03-13 RX ADMIN — CARVEDILOL 37.5 MG: 25 TABLET, FILM COATED ORAL at 08:31

## 2024-03-13 RX ADMIN — TACROLIMUS 1.5 MG: 0.5 CAPSULE ORAL at 06:10

## 2024-03-13 RX ADMIN — BUMETANIDE 2 MG: 0.25 INJECTION INTRAMUSCULAR; INTRAVENOUS at 14:42

## 2024-03-13 RX ADMIN — Medication 1 TABLET: at 08:31

## 2024-03-13 RX ADMIN — INSULIN LISPRO 2 UNITS: 100 INJECTION, SOLUTION INTRAVENOUS; SUBCUTANEOUS at 14:23

## 2024-03-13 RX ADMIN — ISOSORBIDE MONONITRATE 60 MG: 60 TABLET, EXTENDED RELEASE ORAL at 08:31

## 2024-03-13 RX ADMIN — PRAVASTATIN SODIUM 10 MG: 20 TABLET ORAL at 20:31

## 2024-03-13 RX ADMIN — INSULIN GLARGINE 5 UNITS: 100 INJECTION, SOLUTION SUBCUTANEOUS at 20:32

## 2024-03-13 RX ADMIN — ASPIRIN 81 MG 81 MG: 81 TABLET ORAL at 08:31

## 2024-03-13 RX ADMIN — PREDNISONE 5 MG: 5 TABLET ORAL at 08:31

## 2024-03-13 RX ADMIN — NIFEDIPINE 60 MG: 60 TABLET, FILM COATED, EXTENDED RELEASE ORAL at 08:31

## 2024-03-13 RX ADMIN — CARVEDILOL 37.5 MG: 25 TABLET, FILM COATED ORAL at 20:31

## 2024-03-13 RX ADMIN — ACETAMINOPHEN 975 MG: 325 TABLET ORAL at 14:52

## 2024-03-13 RX ADMIN — SODIUM ZIRCONIUM CYCLOSILICATE 5 G: 5 POWDER, FOR SUSPENSION ORAL at 09:29

## 2024-03-13 RX ADMIN — BUMETANIDE 2 MG: 0.25 INJECTION INTRAMUSCULAR; INTRAVENOUS at 08:26

## 2024-03-13 RX ADMIN — MAGNESIUM SULFATE HEPTAHYDRATE 2 G: 40 INJECTION, SOLUTION INTRAVENOUS at 12:01

## 2024-03-13 RX ADMIN — HEPARIN SODIUM 5000 UNITS: 5000 INJECTION INTRAVENOUS; SUBCUTANEOUS at 00:17

## 2024-03-13 RX ADMIN — IRON SUCROSE 200 MG: 20 INJECTION, SOLUTION INTRAVENOUS at 08:26

## 2024-03-13 RX ADMIN — CALCITRIOL CAPSULES 0.25 MCG 0.25 MCG: 0.25 CAPSULE ORAL at 08:31

## 2024-03-13 RX ADMIN — TACROLIMUS 1.5 MG: 0.5 CAPSULE ORAL at 18:07

## 2024-03-13 RX ADMIN — AZATHIOPRINE 25 MG: 50 TABLET ORAL at 08:40

## 2024-03-13 RX ADMIN — Medication 1000 UNITS: at 08:31

## 2024-03-13 RX ADMIN — CINACALCET 30 MG: 30 TABLET, FILM COATED ORAL at 08:31

## 2024-03-13 RX ADMIN — NIFEDIPINE 60 MG: 60 TABLET, FILM COATED, EXTENDED RELEASE ORAL at 20:31

## 2024-03-13 RX ADMIN — HEPARIN SODIUM 5000 UNITS: 5000 INJECTION INTRAVENOUS; SUBCUTANEOUS at 08:33

## 2024-03-13 RX ADMIN — BUMETANIDE 2 MG: 0.25 INJECTION INTRAMUSCULAR; INTRAVENOUS at 20:33

## 2024-03-13 RX ADMIN — PANTOPRAZOLE SODIUM 40 MG: 40 TABLET, DELAYED RELEASE ORAL at 06:11

## 2024-03-13 RX ADMIN — SULFAMETHOXAZOLE AND TRIMETHOPRIM 1 TABLET: 400; 80 TABLET ORAL at 08:31

## 2024-03-13 ASSESSMENT — COGNITIVE AND FUNCTIONAL STATUS - GENERAL
MOBILITY SCORE: 19
WALKING IN HOSPITAL ROOM: A LITTLE
CLIMB 3 TO 5 STEPS WITH RAILING: A LITTLE
DAILY ACTIVITIY SCORE: 24
STANDING UP FROM CHAIR USING ARMS: A LITTLE
MOVING TO AND FROM BED TO CHAIR: A LITTLE
CLIMB 3 TO 5 STEPS WITH RAILING: A LITTLE
WALKING IN HOSPITAL ROOM: A LITTLE
MOBILITY SCORE: 22
DAILY ACTIVITIY SCORE: 24
TURNING FROM BACK TO SIDE WHILE IN FLAT BAD: A LITTLE

## 2024-03-13 ASSESSMENT — PAIN SCALES - GENERAL
PAINLEVEL_OUTOF10: 0 - NO PAIN
PAINLEVEL_OUTOF10: 5 - MODERATE PAIN
PAINLEVEL_OUTOF10: 0 - NO PAIN
PAINLEVEL_OUTOF10: 0 - NO PAIN

## 2024-03-13 ASSESSMENT — PAIN - FUNCTIONAL ASSESSMENT
PAIN_FUNCTIONAL_ASSESSMENT: 0-10
PAIN_FUNCTIONAL_ASSESSMENT: 0-10

## 2024-03-13 ASSESSMENT — PAIN DESCRIPTION - LOCATION: LOCATION: BACK

## 2024-03-13 NOTE — PROGRESS NOTES
Subjective   Manolo Bashir is a 68 y.o. male on hospital day 2 reports he has had some good urine response from the diuretics.  Breathing doing well    Objective     Exam     Vitals:    03/12/24 1400 03/12/24 2027 03/13/24 0442 03/13/24 0831   BP: 163/62 156/65 158/57 159/58   Pulse: 62 58 62 66   Resp: 18 20 19    Temp: 36.9 °C (98.4 °F) 36.7 °C (98.1 °F) 36 °C (96.8 °F)    TempSrc:   Temporal    SpO2: 97% 94% 98%    Weight:       Height:          Intake/Output last 3 shifts:  I/O last 3 completed shifts:  In: 815.4 (10.3 mL/kg) [P.O.:240; I.V.:575.4 (7.3 mL/kg)]  Out: 3125 (39.6 mL/kg) [Urine:3125 (1.1 mL/kg/hr)]  Weight: 78.9 kg     Physical Exam  Vitals reviewed.   Constitutional:       General: He is not in acute distress.     Appearance: Normal appearance. He is not ill-appearing, toxic-appearing or diaphoretic.      Comments: Breathing smooth and calm.     HENT:      Head: Normocephalic and atraumatic.   Cardiovascular:      Rate and Rhythm: Normal rate and regular rhythm.      Pulses: Normal pulses.      Heart sounds: Murmur (2 out of 6 systolic murmur heard greatest over the left upper sternal border) heard.      No friction rub. No gallop.   Pulmonary:      Effort: Pulmonary effort is normal.      Breath sounds: Normal breath sounds. No wheezing, rhonchi or rales.   Abdominal:      General: Bowel sounds are normal. There is no distension.      Palpations: Abdomen is soft.      Tenderness: There is no abdominal tenderness. There is no guarding or rebound.   Musculoskeletal:      Right lower leg: Edema present.      Left lower leg: Edema present.      Comments: Tracebilateral pitting edema in bilateral lower extremities.   Skin:     General: Skin is warm and dry.   Neurological:      General: No focal deficit present.      Mental Status: He is alert.      Comments: Nonfocal   Psychiatric:         Mood and Affect: Mood normal.         Behavior: Behavior normal.            Medications   aspirin, 81 mg,  oral, Daily  azaTHIOprine, 25 mg, oral, Daily  bumetanide, 2 mg, intravenous, TID  calcitriol, 0.25 mcg, oral, Daily  carvedilol, 37.5 mg, oral, BID  cholecalciferol, 1,000 Units, oral, Daily  cinacalcet, 30 mg, oral, Daily  heparin (porcine), 5,000 Units, subcutaneous, q8h  [Held by provider] hydrALAZINE, 100 mg, oral, TID  insulin glargine, 5 Units, subcutaneous, q24h  insulin lispro, 0-5 Units, subcutaneous, TID with meals  iron sucrose, 200 mg, intravenous, Every other day  isosorbide mononitrate ER, 60 mg, oral, Daily  magnesium sulfate, 2 g, intravenous, Once  multivitamin with minerals, 1 tablet, oral, Daily  NIFEdipine ER, 60 mg, oral, BID  pantoprazole, 40 mg, oral, Daily before breakfast  polyethylene glycol, 17 g, oral, Daily  pravastatin, 10 mg, oral, Nightly  predniSONE, 5 mg, oral, Daily  sodium zirconium cyclosilicate, 5 g, oral, Daily  sulfamethoxazole-trimethoprim, 1 tablet, oral, Daily  tacrolimus, 1.5 mg, oral, q12h ZOË       PRN medications: acetaminophen, dextrose 10 % in water (D10W), dextrose, docusate sodium, glucagon, ondansetron       Labs     All new labs reviewed:  some of the basic labs as follows -     Results from last 7 days   Lab Units 03/13/24  0952 03/12/24  0814 03/11/24  0431   WBC AUTO x10*3/uL 1.9* 2.3* 2.5*   HEMOGLOBIN g/dL 8.4* 7.5* 8.0*   HEMATOCRIT % 26.4* 24.0* 23.1*   PLATELETS AUTO x10*3/uL 92* 94* 88*   NEUTROS PCT AUTO % 46.8 41.6 56.5   LYMPHS PCT AUTO % 34.4 33.3 23.0   MONOS PCT AUTO % 11.8 17.7 14.9   EOS PCT AUTO % 4.3 5.6 2.4            Results from last 72 hours   Lab Units 03/13/24  0952 03/12/24  1740 03/12/24  0814   SODIUM mmol/L 139 140 141   POTASSIUM mmol/L 4.3 5.3 4.4   CHLORIDE mmol/L 105 104 106   CO2 mmol/L 26 27 28   BUN mg/dL 61* 59* 59*   CREATININE mg/dL 6.05* 5.88* 5.30*       Results from last 72 hours   Lab Units 03/13/24  0952 03/12/24  1740 03/12/24  0814 03/11/24  2356 03/11/24  0431   ALK PHOS U/L  --   --   --   --  47   AST U/L  --   --   " --   --  45*   ALT U/L  --   --   --   --  44   BILIRUBIN TOTAL mg/dL  --   --   --   --  0.4   ALBUMIN g/dL 3.0* 3.1* 3.0*   < > 3.4   PROTEIN TOTAL g/dL  --   --   --   --  6.0*   LIPASE U/L  --   --   --   --  9    < > = values in this interval not displayed.       Results from last 72 hours   Lab Units 03/13/24  0952 03/12/24  1740 03/12/24  0814 03/11/24  2356 03/11/24  0431   GLUCOSE mg/dL 196* 240* 98 242* 169*           No results found for: \"TR1\"  Lab Results   Component Value Date    URINECULTURE CANCELED 07/19/2023    BLOODCULT No growth at 4 days -  FINAL REPORT 10/19/2023    BLOODCULT No growth at 4 days -  FINAL REPORT 10/19/2023    BLOODCULT  09/19/2023     No Growth at 1 days~No Growth at 2 days~No Growth at 3 days~NO GROWTH at 4 days - FINAL REPORT    BLOODCULT  09/19/2023     No Growth at 1 days~No Growth at 2 days~No Growth at 3 days~NO GROWTH at 4 days - FINAL REPORT            Imaging   ECG 12 Lead  Normal sinus rhythm  Nonspecific intraventricular block  Cannot rule out Septal infarct (cited on or before 11-MAR-2024)  Abnormal ECG  When compared with ECG of 11-MAR-2024 04:24,  QRS duration has increased  ST no longer depressed in Inferior leads  ST no longer depressed in Anterolateral leads  Confirmed by Muna Murillo ([id number]) on 3/12/2024 10:27:17 PM  ECG 12 Lead  Normal sinus rhythm  Left bundle branch block  Abnormal ECG  When compared with ECG of 11-MAR-2024 04:24,  Left bundle branch block is now Present  Criteria for Septal infarct are no longer Present  Confirmed by Muna Murillo ([id number]) on 3/12/2024 10:24:42 PM  ECG 12 lead  Atrial flutter with 4:1 AV conduction  Right axis deviation  Nonspecific intraventricular block  Abnormal ECG  When compared with ECG of 20-JAN-2024 04:29,  Atrial flutter has replaced Sinus rhythm  QRS duration has increased  See ED provider note for full interpretation and clinical correlation  Confirmed by Aid Wagner ([id number]) on 3/12/2024 10:22:31 AM  ECG " 12 Lead  Normal sinus rhythm  Rightward axis  Nonspecific intraventricular block  Cannot rule out Septal infarct , age undetermined  Abnormal ECG  When compared with ECG of 11-MAR-2024 03:10,  Sinus rhythm has replaced Atrial flutter  QRS duration has decreased  Inverted T waves have replaced nonspecific T wave abnormality in Inferior leads  See ED provider note for full interpretation and clinical correlation  Confirmed by Adi Wagner ([id number]) on 3/12/2024 10:22:20 AM     No results found for this or any previous visit from the past 1095 days.     Encounter Date: 03/11/24   ECG 12 Lead   Result Value    Ventricular Rate 65    Atrial Rate 65    GA Interval 174    QRS Duration 144    QT Interval 432    QTC Calculation(Bazett) 449    P Axis 69    R Axis -21    T Axis 32    QRS Count 11    Q Onset 214    P Onset 127    P Offset 160    T Offset 430    QTC Fredericia 443    Narrative    Normal sinus rhythm  Left bundle branch block  Abnormal ECG  When compared with ECG of 11-MAR-2024 04:24,  Left bundle branch block is now Present  Criteria for Septal infarct are no longer Present  Confirmed by Muna Murillo ([id number]) on 3/12/2024 10:24:42 PM        Assessment and Plan     Shortness of breath: Likely related to worsening volume overload in the setting of declining renal function.  Patient is slated for outpatient dialysis fistula placement later this month.  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  Recent kappa/lambda ratio was normal despite individuals being elevated.  Shortness of breath has improved with diuresis although creatinine is uptrending with creatinine now 6.05  -Follow-up nephrology recommendations for further diuresis  -Continue strict ins and outs and daily weights.  -For now we will continue home immunosuppression regimen (tacrolimus, azathioprine, and prednisone).  Will adjust dosing with the guidance of the transplant service  -Checking ultrasound of right arm graft  to see viability for future use  -Continue home Sensipar and vitamin D3/calcitriol  -Trend renal function panel.  Will continue Lokelma as needed.  Potassium 4.3 this morning  -Monitor postvoid residuals  -Check PTH and vitamin D levels    Cardiovascular: Blood pressure rising in the emergency room to the point is now elevated.  EKG without ischemic change and troponin is negative x 2.  Recent echocardiogram from 11/23 showed preserved EF with severe LVH, mild to moderate aortic regurgitation  -Continue home carvedilol, nifedipine, and Imdur  -Use hydralazine as needed.  May need to start standing dose lower than what patient was taking as outpatient which was 100 mg 3 times daily  -Continue statin and aspirin  -May need to repeat echocardiogram to assess for any further worsening of valvular dysfunction.  Overall at this presentation seems more like it is related to decreased urine output from poor renal function    Pancytopenia: Iron is low mildly elevated ferritin and low percent sat.  Folate and B12 are normal  -Replete iron    Diabetes mellitus: A1c from 1/24 is 7.7.  Patient required 4 units of sliding scale insulin yesterday  -Continue sliding scale insulin  -Add Lantus 5 units    GI:  -Continue PPI  -Bowel care    DVT prophylaxis    Physical therapy/Occupational Therapy when appropriate    Daniel Peña MD     Of note the above was done with Dragon dictation system.  Note was proofread to minimize errors.

## 2024-03-13 NOTE — PROGRESS NOTES
"Manolo Bashir is a 68 y.o. male on day 2 with a past medical history of ESRD s/p renal transplant x 2 in 1992 in 2013 on immunosuppression, MGUS, pancytopenia, diabetes, hypertension, prostate cancer s/p radical prostatectomy, HCV fully treated with RNA not detected 10/18/2023 of admission presenting with Hypervolemia, unspecified hypervolemia type.    Subjective   NAEON. Breathing is better. No orthopnea. Denies CP/SOB, abdominal pain, constipation, diarrhea, or new rashes. Voiding well and endorsing a good appetite.        Objective   Last Recorded Vitals  Blood pressure 171/77, pulse 68, temperature 36.4 °C (97.6 °F), resp. rate 16, height 1.727 m (5' 8\"), weight 78.9 kg (174 lb), SpO2 99 %.  Intake/Output last 3 Shifts:  I/O last 3 completed shifts:  In: 26.7 (0.3 mL/kg) [I.V.:26.7 (0.3 mL/kg)]  Out: 2400 (30.4 mL/kg) [Urine:2400 (0.8 mL/kg/hr)]  Weight: 78.9 kg     Physical Exam  Constitutional:       Appearance: He is not toxic-appearing.   Eyes:      Conjunctiva/sclera: Conjunctivae normal.      Pupils: Pupils are equal, round, and reactive to light.   Cardiovascular:      Rate and Rhythm: Normal rate and regular rhythm.      Heart sounds: Murmur heard.      Comments: Systolic murmur best appreciated at left sternal border.  Pulmonary:      Effort: Pulmonary effort is normal.      Breath sounds: Rales present.      Comments: Decreased basilar breath sounds bilaterally. Some inspiratory rales present.  Abdominal:      General: Abdomen is flat. Bowel sounds are normal. There is no distension.      Palpations: Abdomen is soft.   Musculoskeletal:      Cervical back: Normal range of motion.      Right lower leg: No edema.      Left lower leg: No edema.   Skin:     General: Skin is warm.   Neurological:      General: No focal deficit present.      Mental Status: He is alert.   Psychiatric:         Mood and Affect: Mood normal.         Behavior: Behavior normal.       Relevant Results    Scheduled " medications  aspirin, 81 mg, oral, Daily  azaTHIOprine, 25 mg, oral, Daily  bumetanide, 2 mg, intravenous, TID  calcitriol, 0.25 mcg, oral, Daily  carvedilol, 37.5 mg, oral, BID  cholecalciferol, 1,000 Units, oral, Daily  cinacalcet, 30 mg, oral, Daily  heparin (porcine), 5,000 Units, subcutaneous, q8h  [Held by provider] hydrALAZINE, 100 mg, oral, TID  insulin glargine, 5 Units, subcutaneous, q24h  insulin lispro, 0-5 Units, subcutaneous, TID with meals  iron sucrose, 200 mg, intravenous, Every other day  isosorbide mononitrate ER, 60 mg, oral, Daily  multivitamin with minerals, 1 tablet, oral, Daily  NIFEdipine ER, 60 mg, oral, BID  pantoprazole, 40 mg, oral, Daily before breakfast  polyethylene glycol, 17 g, oral, Daily  pravastatin, 10 mg, oral, Nightly  predniSONE, 5 mg, oral, Daily  sodium zirconium cyclosilicate, 5 g, oral, Daily  sulfamethoxazole-trimethoprim, 1 tablet, oral, Daily  tacrolimus, 1.5 mg, oral, q12h ZOË      Continuous medications       PRN medications  PRN medications: acetaminophen, dextrose 10 % in water (D10W), dextrose, docusate sodium, glucagon, ondansetron     Results from last 7 days   Lab Units 03/13/24  0952 03/12/24  0814 03/11/24  0431   WBC AUTO x10*3/uL 1.9* 2.3* 2.5*   HEMOGLOBIN g/dL 8.4* 7.5* 8.0*   HEMATOCRIT % 26.4* 24.0* 23.1*   PLATELETS AUTO x10*3/uL 92* 94* 88*       Results from last 7 days   Lab Units 03/12/24  1740 03/12/24  0814 03/11/24  2356   SODIUM mmol/L 140 141 139   POTASSIUM mmol/L 5.3 4.4 5.9*   CO2 mmol/L 27 28 26   ANION GAP mmol/L 14 11 12   BUN mg/dL 59* 59* 57*   CREATININE mg/dL 5.88* 5.30* 5.21*   GLUCOSE mg/dL 240* 98 242*   EGFR mL/min/1.73m*2 10* 11* 11*   MAGNESIUM mg/dL 1.88 1.85 1.97   PHOSPHORUS mg/dL 2.8 2.9 2.6       Results from last 7 days   Lab Units 03/11/24  0431   ALT U/L 44   AST U/L 45*   ALK PHOS U/L 47        Results from last 7 days   Lab Units 03/11/24  0431   LIPASE U/L 9       Relevant Imaging  XR chest 1 view    1. Extensive  patchy airspace opacities throughout the right lung with obscuration of the right costophrenic angle. There are prominent perihilar interstitial markings bilaterally. Findings in the setting of cardiomegaly likely represent right pleural effusion and pulmonary edema. Probable small left pleural effusion also noted. Multifocal infection can not be excluded.   I personally reviewed the images/study and I agree with the findings as stated by resident physician Dr. Jeison Laws . This study was interpreted at [facility], [place], [place].   MACRO: Critical Finding:  See findings. Notification was initiated on 3/11/2024 at 4:19 am by  Jeison Laws.  (**-OCF-**) Instructions:   Signed by: Octavio Henriquez 3/11/2024 5:17 AM Dictation workstation:   [id number]    Assessment and Plan:  Manolo Bashir is a 68-year-old male with a past medical history of ESRD s/p renal transplant x 2 in 1992 in 2013 on immunosuppression, MGUS, pancytopenia, diabetes, hypertension, prostate cancer s/p radical prostatectomy, HCV fully treated with RNA not detected 10/18/2023 presenting to the emergency department with shortness of breath.  CXR concerning for fluid overload with pleural effusions.  This is likely fluid overload in setting of CKD, excess salt water and discontinuation of hydralazine. Transplant nephrology following for possible initiation of dialysis.      Updates 3/13:  - 5u glargine at night  - 5mg Lokelma daily  - R upper extremity US to assess cannulation of AV fistula for dialysis access  - IV Bumex 2mg TID    #Fluid overload   #hypertension  :: TTE 11/18 showed EF 60 to 65%, left ventricular cavity moderately dilated, severe concentric left ventricular hypertrophy, mild to moderate aortic valve regurgitation  - Hold home Hydralazine 25 mg PO TID  - Continue home Amlodipine 10 mg PO daily  - Continue home Carvedilol 37.5mg PO BID   - Continue home Imdur 60 mg  - Bumex 2 mg  TID  - Strict I's and O's  - Daily weights  - Monitor need for Cedeño placement given prostatectomy history    #Anemia   #Leukopenia  ::3/12 Hgb at 7.5; baseline ~10. WBC 2.3   - Transplant consulted, recommended iron studies, B12, folate  - Order type and screen   >Consider transfusion if Hbg < 7 and symptomatic  - IV venofer 200mg every other day for 5 doses  - Will consider flow cytometry  - EBV and CMV negative    #ESRD s/p kidney transplant  :: impaired allograft function, CKD stage 4-5  - Continue home Tacorlimus 1.5mg BID; monitor trough levels  - Continue home Prednisone 5mg daily  - Continue home Imuran 25mg daily  - Continue home Bactrim  - Continue home Sensipar and vitamin D3/calcitriol   - Pending R upper extremity US of AV fistula to assess cannulation     #hyperlipidemia  - Continue home pravastatin 40 mg nightly     #CAD  - Continue home ASA 81     #DM  - Low dose sliding scale  - 5U glargine at night     #Hx of Back pain  ::MRI 09/19/2023 showing nonspecific focus of abnormal signal in R pedicle of L4 c/w osseous hemangioma, stress fracture, or osteoid osteoma. No discitis or osteomyelitis. No evidence of metastasis.  -Tylenol 650 mg q8h PRN     F: PRN  E: PRN  N: Regular  A: PIV     Full Code  NOK: Amalia     Hu Porter MD  PGY1 Neurology

## 2024-03-13 NOTE — PROGRESS NOTES
Transplant Nephrology progress note     Date of admission: 3/11/2024     Manolo Bashir is a 68 y.o.  with [facility]   Past Medical History:   Diagnosis Date    Anemia     Arthritis     Cataract     Chronic kidney disease, stage 3 unspecified (CMS/HCC) 09/26/2018    Stage 3 chronic kidney disease    CKD (chronic kidney disease)     stage V    COVID-19 06/18/2020    COVID-19 virus infection    Diabetes (CMS/[facility], located [facility])     ESRD (end stage renal disease) (CMS/[facility], located within [facility])     Focal and segmental proliferative glomerulonephritis 12/23/2023    HTN (hypertension)     Hyperlipidemia     Other long term (current) drug therapy 07/20/2021    High risk medication use    Personal history of other diseases of the circulatory system     Personal history of cardiac murmur    Personal history of other infectious and parasitic diseases 08/17/2015    History of hepatitis    Polyp, colonic 08/17/2023    Primary osteoarthritis of both ankles 08/17/2023    Prostate cancer (CMS/[facility], located [facility])     Tubular adenoma of colon 08/17/2023    Unspecified kidney failure 08/17/2016    Renal failure        SUBJECTIVE:  DENIED ANY COMPLAINTS .UOP in last 24 hrs is 975 cc        PROBLEM LIST:  Principal Problem:    Hypervolemia, unspecified hypervolemia type         ALLERGIES:  No Known Allergies         CURRENT MEDICATIONS:  Scheduled medications  aspirin, 81 mg, oral, Daily  azaTHIOprine, 25 mg, oral, Daily  bumetanide, 2 mg, intravenous, TID  calcitriol, 0.25 mcg, oral, Daily  carvedilol, 37.5 mg, oral, BID  cholecalciferol, 1,000 Units, oral, Daily  cinacalcet, 30 mg, oral, Daily  heparin (porcine), 5,000 Units, subcutaneous, q8h  [Held by provider] hydrALAZINE, 100 mg, oral, TID  insulin glargine, 5 Units, subcutaneous, q24h  insulin lispro, 0-5 Units, subcutaneous, TID with meals  iron sucrose, 200 mg, intravenous, Every other day  isosorbide mononitrate ER, 60 mg, oral, Daily  multivitamin with minerals, 1 tablet, oral, Daily  NIFEdipine ER, 60 mg, oral, BID  pantoprazole,  "40 mg, oral, Daily before breakfast  polyethylene glycol, 17 g, oral, Daily  pravastatin, 10 mg, oral, Nightly  predniSONE, 5 mg, oral, Daily  sodium zirconium cyclosilicate, 5 g, oral, Daily  sulfamethoxazole-trimethoprim, 1 tablet, oral, Daily  tacrolimus, 1.5 mg, oral, q12h ZOË      Continuous medications     PRN medications  PRN medications: acetaminophen, dextrose 10 % in water (D10W), dextrose, docusate sodium, glucagon, ondansetron       OBJECTIVE:    VITALS: Visit Vitals  /60   Pulse 65   Temp 36.9 °C (98.4 °F) (Temporal)   Resp 18   Ht 1.727 m (5' 8\")   Wt 78.9 kg (174 lb)   SpO2 98%   BMI 26.46 kg/m²   Smoking Status Never   BSA 1.95 m²          General: No distress   Mucosa moist   AI, AC, AF     HEENT: PEERLA  CVS: S1 S2 no murmurs  RESP:  Lungs clear to auscultation   ABDO: Soft, non-tender   Neuro: A + O x 3  Skin: No rash   Extremities: No edema       LABS:  Results from last 72 hours   Lab Units 03/13/24  0952   WBC AUTO x10*3/uL 1.9*   HEMOGLOBIN g/dL 8.4*   MCV fL 90   PLATELETS AUTO x10*3/uL 92*   BUN mg/dL 61*   CREATININE mg/dL 6.05*   CALCIUM mg/dL 9.7   TACROLIMUS ng/mL 5.3              Intake/Output Summary (Last 24 hours) at 3/13/2024 1512  Last data filed at 3/13/2024 1439  Gross per 24 hour   Intake 60 ml   Output 875 ml   Net -815 ml            ASSESSMENT AND PLAN:     Manolo Bashir is a 68 y.o.M with PMH ESRD (on HD [id number]), s/p 2x renal transplants (1992, 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    -Patient completed 2 doses of rituximab total of 2 g -1 /7/2024.  -Recent hospital admission as of 1/16/2024 with hypertensive urgency with fluid overload.  -Patient currently got admitted for volume overload with shortness of breath.  Transplant nephrology consulted for further management.     Allograft function:  -gradual up trend in creatinine noticed but stable electrolytes No acute indication for dialysis at this time we will continue conservative management for now.  -pls consider checking urine lytes and can stop lokelma if potassium is<4.    -Right upper extremity AV fistula which is having good thrill but please consider ultrasound to evaluate for cannulation.  -Avoid nephrotoxins and renally dose medications.     Immunosuppression: Continue with the azathioprine, prednisone 5 mg, tacrolimus 1.5 twice daily.  Levels recently is around 2.8-3 please consider checking trough levels and will adjust her dose accordingly.     Hemodynamics: Blood pressures today is optimal.  Continue with the carvedilol, Imdur, nifedipine.     Bone mineral disease: Calcium and phosphorus levels are optimal continue with the current management.  Please consider checking PTH and vitamin D levels.     Anemia leukopenia: Agree with IV iron.  CMV and EBV pending Pls consider checking flow cytometry blood.          Thank you for consulting .  Dina Benoit MD       Notes created by Antoine -Please excuse the Typos .

## 2024-03-13 NOTE — CARE PLAN
Patient complaints of body aches, states he feels it is due to the bumex, on call physician notified tylenol administered, pt asleep.     Problem: Pain  Goal: My pain/discomfort is manageable  Outcome: Progressing     Problem: Pain  Goal: My pain/discomfort is manageable  Outcome: Progressing   The patient's goals for the shift include comfort    The clinical goals for the shift include patient will report pain/body aches subsiding by the end of the shift    Over the shift, the patient did make progress toward the following goals.

## 2024-03-13 NOTE — CARE PLAN
The patient's goals for the shift include comfort    The clinical goals for the shift include Patient will ambulate in garcia during shift    Patient c/o back pain. Tylenol given with reported relief. Patient ambulated in garcia with mobility aide. Patient received Iron Sucrose and Magnesium sulfate x 1. No adverse events.          Problem: Pain  Goal: My pain/discomfort is manageable  Outcome: Progressing     Problem: Safety  Goal: Patient will be injury free during hospitalization  Outcome: Progressing  Goal: I will remain free of falls  Outcome: Progressing     Problem: Daily Care  Goal: Daily care needs are met  Outcome: Progressing     Problem: Psychosocial Needs  Goal: Demonstrates ability to cope with hospitalization/illness  Outcome: Progressing  Goal: Collaborate with me, my family, and caregiver to identify my specific goals  Outcome: Progressing     Problem: Discharge Barriers  Goal: My discharge needs are met  3/13/2024 1730 by Elodia Liu RN  Outcome: Progressing  3/13/2024 1149 by Elodia Liu RN  Flowsheets (Taken 3/13/2024 1149)  Resident's discharge needs are met: Identify potential discharge barriers on admission and throughout stay     Problem: Diabetes  Goal: Achieve decreasing blood glucose levels by end of shift  Outcome: Progressing  Goal: Increase stability of blood glucose readings by end of shift  Outcome: Progressing  Goal: Decrease in ketones present in urine by end of shift  Outcome: Progressing  Goal: Maintain electrolyte levels within acceptable range throughout shift  Outcome: Progressing  Goal: Maintain glucose levels >70mg/dl to <250mg/dl throughout shift  Outcome: Progressing  Goal: No changes in neurological exam by end of shift  Outcome: Progressing  Goal: Learn about and adhere to nutrition recommendations by end of shift  Outcome: Progressing  Goal: Vital signs within normal range for age by end of shift  Outcome: Progressing  Goal: Increase self care and/or family  involovement by end of shift  Outcome: Progressing  Goal: Receive DSME education by end of shift  Outcome: Progressing     Problem: Pain - Adult  Goal: Verbalizes/displays adequate comfort level or baseline comfort level  Outcome: Progressing     Problem: Safety - Adult  Goal: Free from fall injury  Outcome: Progressing     Problem: Discharge Planning  Goal: Discharge to home or other facility with appropriate resources  Outcome: Progressing     Problem: Chronic Conditions and Co-morbidities  Goal: Patient's chronic conditions and co-morbidity symptoms are monitored and maintained or improved  Outcome: Progressing

## 2024-03-14 ENCOUNTER — APPOINTMENT (OUTPATIENT)
Dept: VASCULAR MEDICINE | Facility: HOSPITAL | Age: 69
End: 2024-03-14
Payer: COMMERCIAL

## 2024-03-14 LAB
ALBUMIN SERPL BCP-MCNC: 3.1 G/DL (ref 3.4–5)
ANION GAP SERPL CALC-SCNC: 13 MMOL/L (ref 10–20)
BASOPHILS # BLD AUTO: 0.01 X10*3/UL (ref 0–0.1)
BASOPHILS NFR BLD AUTO: 0.4 %
BUN SERPL-MCNC: 58 MG/DL (ref 6–23)
CALCIUM SERPL-MCNC: 9.6 MG/DL (ref 8.6–10.6)
CHLORIDE SERPL-SCNC: 104 MMOL/L (ref 98–107)
CMV DNA SERPL NAA+PROBE-LOG IU: NORMAL {LOG_IU}/ML
CO2 SERPL-SCNC: 26 MMOL/L (ref 21–32)
CREAT SERPL-MCNC: 5.67 MG/DL (ref 0.5–1.3)
EGFRCR SERPLBLD CKD-EPI 2021: 10 ML/MIN/1.73M*2
EOSINOPHIL # BLD AUTO: 0.08 X10*3/UL (ref 0–0.7)
EOSINOPHIL NFR BLD AUTO: 3.5 %
ERYTHROCYTE [DISTWIDTH] IN BLOOD BY AUTOMATED COUNT: 15.1 % (ref 11.5–14.5)
GLUCOSE BLD MANUAL STRIP-MCNC: 148 MG/DL (ref 74–99)
GLUCOSE BLD MANUAL STRIP-MCNC: 183 MG/DL (ref 74–99)
GLUCOSE BLD MANUAL STRIP-MCNC: 211 MG/DL (ref 74–99)
GLUCOSE BLD MANUAL STRIP-MCNC: 242 MG/DL (ref 74–99)
GLUCOSE SERPL-MCNC: 149 MG/DL (ref 74–99)
HCT VFR BLD AUTO: 27.2 % (ref 41–52)
HGB BLD-MCNC: 8.6 G/DL (ref 13.5–17.5)
IMM GRANULOCYTES # BLD AUTO: 0.02 X10*3/UL (ref 0–0.7)
IMM GRANULOCYTES NFR BLD AUTO: 0.9 % (ref 0–0.9)
LABORATORY COMMENT REPORT: NOT DETECTED
LYMPHOCYTES # BLD AUTO: 0.68 X10*3/UL (ref 1.2–4.8)
LYMPHOCYTES NFR BLD AUTO: 29.4 %
MAGNESIUM SERPL-MCNC: 2.12 MG/DL (ref 1.6–2.4)
MCH RBC QN AUTO: 29.5 PG (ref 26–34)
MCHC RBC AUTO-ENTMCNC: 31.6 G/DL (ref 32–36)
MCV RBC AUTO: 93 FL (ref 80–100)
MONOCYTES # BLD AUTO: 0.41 X10*3/UL (ref 0.1–1)
MONOCYTES NFR BLD AUTO: 17.7 %
NEUTROPHILS # BLD AUTO: 1.11 X10*3/UL (ref 1.2–7.7)
NEUTROPHILS NFR BLD AUTO: 48.1 %
NRBC BLD-RTO: 0 /100 WBCS (ref 0–0)
PHOSPHATE SERPL-MCNC: 3 MG/DL (ref 2.5–4.9)
PLATELET # BLD AUTO: 117 X10*3/UL (ref 150–450)
POTASSIUM SERPL-SCNC: 4.3 MMOL/L (ref 3.5–5.3)
RBC # BLD AUTO: 2.92 X10*6/UL (ref 4.5–5.9)
SODIUM SERPL-SCNC: 139 MMOL/L (ref 136–145)
WBC # BLD AUTO: 2.3 X10*3/UL (ref 4.4–11.3)

## 2024-03-14 PROCEDURE — 2500000001 HC RX 250 WO HCPCS SELF ADMINISTERED DRUGS (ALT 637 FOR MEDICARE OP): Performed by: STUDENT IN AN ORGANIZED HEALTH CARE EDUCATION/TRAINING PROGRAM

## 2024-03-14 PROCEDURE — 2500000004 HC RX 250 GENERAL PHARMACY W/ HCPCS (ALT 636 FOR OP/ED)

## 2024-03-14 PROCEDURE — 80069 RENAL FUNCTION PANEL: CPT

## 2024-03-14 PROCEDURE — 85025 COMPLETE CBC W/AUTO DIFF WBC: CPT

## 2024-03-14 PROCEDURE — 83735 ASSAY OF MAGNESIUM: CPT

## 2024-03-14 PROCEDURE — 2500000001 HC RX 250 WO HCPCS SELF ADMINISTERED DRUGS (ALT 637 FOR MEDICARE OP)

## 2024-03-14 PROCEDURE — 36415 COLL VENOUS BLD VENIPUNCTURE: CPT

## 2024-03-14 PROCEDURE — 1100000001 HC PRIVATE ROOM DAILY

## 2024-03-14 PROCEDURE — 99233 SBSQ HOSP IP/OBS HIGH 50: CPT | Performed by: HOSPITALIST

## 2024-03-14 PROCEDURE — 2500000002 HC RX 250 W HCPCS SELF ADMINISTERED DRUGS (ALT 637 FOR MEDICARE OP, ALT 636 FOR OP/ED): Performed by: STUDENT IN AN ORGANIZED HEALTH CARE EDUCATION/TRAINING PROGRAM

## 2024-03-14 PROCEDURE — 99232 SBSQ HOSP IP/OBS MODERATE 35: CPT

## 2024-03-14 PROCEDURE — 2500000002 HC RX 250 W HCPCS SELF ADMINISTERED DRUGS (ALT 637 FOR MEDICARE OP, ALT 636 FOR OP/ED)

## 2024-03-14 PROCEDURE — 82947 ASSAY GLUCOSE BLOOD QUANT: CPT

## 2024-03-14 RX ORDER — HYDRALAZINE HYDROCHLORIDE 25 MG/1
25 TABLET, FILM COATED ORAL EVERY 8 HOURS
Status: DISCONTINUED | OUTPATIENT
Start: 2024-03-14 | End: 2024-03-15

## 2024-03-14 RX ORDER — BUMETANIDE 0.25 MG/ML
2 INJECTION INTRAMUSCULAR; INTRAVENOUS 3 TIMES DAILY
Status: DISCONTINUED | OUTPATIENT
Start: 2024-03-14 | End: 2024-03-15

## 2024-03-14 RX ADMIN — SODIUM ZIRCONIUM CYCLOSILICATE 5 G: 5 POWDER, FOR SUSPENSION ORAL at 10:59

## 2024-03-14 RX ADMIN — ISOSORBIDE MONONITRATE 60 MG: 60 TABLET, EXTENDED RELEASE ORAL at 10:58

## 2024-03-14 RX ADMIN — ACETAMINOPHEN 975 MG: 325 TABLET ORAL at 21:10

## 2024-03-14 RX ADMIN — CALCITRIOL CAPSULES 0.25 MCG 0.25 MCG: 0.25 CAPSULE ORAL at 10:56

## 2024-03-14 RX ADMIN — PANTOPRAZOLE SODIUM 40 MG: 40 TABLET, DELAYED RELEASE ORAL at 06:10

## 2024-03-14 RX ADMIN — BUMETANIDE 2 MG: 0.25 INJECTION INTRAMUSCULAR; INTRAVENOUS at 10:56

## 2024-03-14 RX ADMIN — HYDRALAZINE HYDROCHLORIDE 25 MG: 25 TABLET ORAL at 18:20

## 2024-03-14 RX ADMIN — Medication 1 TABLET: at 10:55

## 2024-03-14 RX ADMIN — Medication 1000 UNITS: at 10:55

## 2024-03-14 RX ADMIN — BUMETANIDE 2 MG: 0.25 INJECTION INTRAMUSCULAR; INTRAVENOUS at 16:21

## 2024-03-14 RX ADMIN — NIFEDIPINE 60 MG: 60 TABLET, FILM COATED, EXTENDED RELEASE ORAL at 20:53

## 2024-03-14 RX ADMIN — CINACALCET 30 MG: 30 TABLET, FILM COATED ORAL at 10:56

## 2024-03-14 RX ADMIN — INSULIN GLARGINE 5 UNITS: 100 INJECTION, SOLUTION SUBCUTANEOUS at 21:09

## 2024-03-14 RX ADMIN — SULFAMETHOXAZOLE AND TRIMETHOPRIM 1 TABLET: 400; 80 TABLET ORAL at 10:58

## 2024-03-14 RX ADMIN — PRAVASTATIN SODIUM 10 MG: 20 TABLET ORAL at 20:53

## 2024-03-14 RX ADMIN — ASPIRIN 81 MG 81 MG: 81 TABLET ORAL at 10:56

## 2024-03-14 RX ADMIN — INSULIN LISPRO 2 UNITS: 100 INJECTION, SOLUTION INTRAVENOUS; SUBCUTANEOUS at 18:21

## 2024-03-14 RX ADMIN — AZATHIOPRINE 25 MG: 50 TABLET ORAL at 10:58

## 2024-03-14 RX ADMIN — BUMETANIDE 2 MG: 0.25 INJECTION INTRAMUSCULAR; INTRAVENOUS at 20:53

## 2024-03-14 RX ADMIN — CARVEDILOL 37.5 MG: 25 TABLET, FILM COATED ORAL at 20:53

## 2024-03-14 RX ADMIN — TACROLIMUS 1.5 MG: 0.5 CAPSULE ORAL at 18:20

## 2024-03-14 RX ADMIN — CARVEDILOL 37.5 MG: 25 TABLET, FILM COATED ORAL at 10:56

## 2024-03-14 RX ADMIN — NIFEDIPINE 60 MG: 60 TABLET, FILM COATED, EXTENDED RELEASE ORAL at 10:56

## 2024-03-14 RX ADMIN — HYDRALAZINE HYDROCHLORIDE 25 MG: 25 TABLET ORAL at 10:55

## 2024-03-14 RX ADMIN — TACROLIMUS 1.5 MG: 0.5 CAPSULE ORAL at 06:10

## 2024-03-14 RX ADMIN — PREDNISONE 5 MG: 5 TABLET ORAL at 10:55

## 2024-03-14 RX ADMIN — INSULIN LISPRO 1 UNITS: 100 INJECTION, SOLUTION INTRAVENOUS; SUBCUTANEOUS at 13:48

## 2024-03-14 ASSESSMENT — COGNITIVE AND FUNCTIONAL STATUS - GENERAL
MOBILITY SCORE: 24
MOBILITY SCORE: 24
DAILY ACTIVITIY SCORE: 24
DAILY ACTIVITIY SCORE: 24

## 2024-03-14 ASSESSMENT — PAIN - FUNCTIONAL ASSESSMENT
PAIN_FUNCTIONAL_ASSESSMENT: 0-10

## 2024-03-14 ASSESSMENT — PAIN DESCRIPTION - ORIENTATION: ORIENTATION: LOWER

## 2024-03-14 ASSESSMENT — PAIN DESCRIPTION - LOCATION: LOCATION: BACK

## 2024-03-14 ASSESSMENT — PAIN SCALES - GENERAL
PAINLEVEL_OUTOF10: 0 - NO PAIN
PAINLEVEL_OUTOF10: 3
PAINLEVEL_OUTOF10: 0 - NO PAIN

## 2024-03-14 NOTE — PROGRESS NOTES
"Manolo Bashir is a 68 y.o. male on day 3 with a past medical history of ESRD s/p renal transplant x 2 in 1992 in 2013 on immunosuppression, MGUS, pancytopenia, diabetes, hypertension, prostate cancer s/p radical prostatectomy, HCV fully treated with RNA not detected 10/18/2023 of admission presenting with Hypervolemia, unspecified hypervolemia type.    Subjective   NAEON. Denies CP/SOB, abdominal pain, constipation, diarrhea, or new rashes. Voiding well and endorsing a good appetite.        Objective   Last Recorded Vitals  Blood pressure 171/77, pulse 68, temperature 36.4 °C (97.6 °F), resp. rate 16, height 1.727 m (5' 8\"), weight 78.9 kg (174 lb), SpO2 99 %.  Intake/Output last 3 Shifts:  I/O last 3 completed shifts:  In: 26.7 (0.3 mL/kg) [I.V.:26.7 (0.3 mL/kg)]  Out: 2400 (30.4 mL/kg) [Urine:2400 (0.8 mL/kg/hr)]  Weight: 78.9 kg     Physical Exam  Constitutional:       Appearance: He is not toxic-appearing.   Eyes:      Conjunctiva/sclera: Conjunctivae normal.      Pupils: Pupils are equal, round, and reactive to light.   Cardiovascular:      Rate and Rhythm: Normal rate and regular rhythm.      Heart sounds: Murmur heard.      Comments: Systolic murmur best appreciated at left sternal border.  Pulmonary:      Effort: Pulmonary effort is normal.      Breath sounds: Rales present.      Comments: Decreased basilar breath sounds bilaterally. Some inspiratory rales present.  Abdominal:      General: Abdomen is flat. Bowel sounds are normal. There is no distension.      Palpations: Abdomen is soft.   Musculoskeletal:      Cervical back: Normal range of motion.      Right lower leg: No edema.      Left lower leg: No edema.   Skin:     General: Skin is warm.   Neurological:      General: No focal deficit present.      Mental Status: He is alert.   Psychiatric:         Mood and Affect: Mood normal.         Behavior: Behavior normal.       Relevant Results    Scheduled medications  aspirin, 81 mg, oral, " Daily  azaTHIOprine, 25 mg, oral, Daily  bumetanide, 2 mg, intravenous, TID  calcitriol, 0.25 mcg, oral, Daily  carvedilol, 37.5 mg, oral, BID  cholecalciferol, 1,000 Units, oral, Daily  cinacalcet, 30 mg, oral, Daily  heparin (porcine), 5,000 Units, subcutaneous, q8h  hydrALAZINE, 100 mg, oral, TID  insulin glargine, 5 Units, subcutaneous, q24h  insulin lispro, 0-5 Units, subcutaneous, TID with meals  iron sucrose, 200 mg, intravenous, Every other day  isosorbide mononitrate ER, 60 mg, oral, Daily  multivitamin with minerals, 1 tablet, oral, Daily  NIFEdipine ER, 60 mg, oral, BID  pantoprazole, 40 mg, oral, Daily before breakfast  polyethylene glycol, 17 g, oral, Daily  pravastatin, 10 mg, oral, Nightly  predniSONE, 5 mg, oral, Daily  sodium zirconium cyclosilicate, 5 g, oral, Daily  sulfamethoxazole-trimethoprim, 1 tablet, oral, Daily  tacrolimus, 1.5 mg, oral, q12h ZOË      Continuous medications       PRN medications  PRN medications: acetaminophen, dextrose 10 % in water (D10W), dextrose, docusate sodium, glucagon, ondansetron     Results from last 7 days   Lab Units 03/13/24  0952 03/12/24  0814 03/11/24  0431   WBC AUTO x10*3/uL 1.9* 2.3* 2.5*   HEMOGLOBIN g/dL 8.4* 7.5* 8.0*   HEMATOCRIT % 26.4* 24.0* 23.1*   PLATELETS AUTO x10*3/uL 92* 94* 88*       Results from last 7 days   Lab Units 03/13/24  0952 03/12/24  1740 03/12/24  0814   SODIUM mmol/L 139 140 141   POTASSIUM mmol/L 4.3 5.3 4.4   CO2 mmol/L 26 27 28   ANION GAP mmol/L 12 14 11   BUN mg/dL 61* 59* 59*   CREATININE mg/dL 6.05* 5.88* 5.30*   GLUCOSE mg/dL 196* 240* 98   EGFR mL/min/1.73m*2 10* 10* 11*   MAGNESIUM mg/dL 1.78 1.88 1.85   PHOSPHORUS mg/dL 3.3 2.8 2.9       Results from last 7 days   Lab Units 03/11/24  0431   ALT U/L 44   AST U/L 45*   ALK PHOS U/L 47        Results from last 7 days   Lab Units 03/11/24  0431   LIPASE U/L 9       Relevant Imaging  XR chest 1 view    1. Extensive patchy airspace opacities throughout the right lung with  obscuration of the right costophrenic angle. There are prominent perihilar interstitial markings bilaterally. Findings in the setting of cardiomegaly likely represent right pleural effusion and pulmonary edema. Probable small left pleural effusion also noted. Multifocal infection can not be excluded.     Assessment and Plan:  Manolo Bashir is a 68-year-old male with a past medical history of ESRD s/p renal transplant x 2 in 1992 in 2013 on immunosuppression, MGUS, pancytopenia, diabetes, hypertension, prostate cancer s/p radical prostatectomy, HCV fully treated with RNA not detected 10/18/2023 presenting to the emergency department with shortness of breath.  CXR concerning for fluid overload with pleural effusions.  This is likely fluid overload in setting of CKD, excess salt water and discontinuation of hydralazine. Transplant nephrology following for possible initiation of dialysis. Pending U/S of old R forearm graft for evaluation of patency.     Updates 3/13:  - R upper extremity US to assess cannulation of AV fistula for dialysis access today  - IV Bumex 2mg TID  - Resume home Hydralazine    #Fluid overload   #hypertension  :: TTE 11/18 showed EF 60 to 65%, left ventricular cavity moderately dilated, severe concentric left ventricular hypertrophy, mild to moderate aortic valve regurgitation  - Resume home Hydralazine 25 mg PO TID  - Continue home Amlodipine 10 mg PO daily  - Continue home Carvedilol 37.5mg PO BID   - Continue home Imdur 60 mg  - Bumex 2 mg TID  - Strict I's and O's  - Daily weights  - Monitor need for Cedeño placement given prostatectomy history    #Anemia   #Leukopenia  ::3/12 Hgb at 7.5; baseline ~10. WBC 2.3   - Transplant consulted, recommended iron studies, B12, folate  - Order type and screen   >Consider transfusion if Hbg < 7 and symptomatic  - IV venofer 200mg every other day for 5 doses  - Will consider flow cytometry  - EBV and CMV negative    #ESRD s/p kidney transplant  :: impaired  allograft function, CKD stage 4-5  - Continue home Tacorlimus 1.5mg BID; monitor trough levels  - Continue home Prednisone 5mg daily  - Continue home Imuran 25mg daily  - Continue home Bactrim  - Continue home Sensipar and vitamin D3/calcitriol   - Pending R upper extremity US of AV fistula to assess cannulation     #hyperlipidemia  - Continue home pravastatin 40 mg nightly     #CAD  - Continue home ASA 81     #DM  - Low dose sliding scale  - 5U glargine at night     #Hx of Back pain  ::MRI 09/19/2023 showing nonspecific focus of abnormal signal in R pedicle of L4 c/w osseous hemangioma, stress fracture, or osteoid osteoma. No discitis or osteomyelitis. No evidence of metastasis.  -Tylenol 650 mg q8h PRN     F: PRN  E: PRN  N: Regular  A: PIV     Full Code  NOK: Amalia     Hu Porter MD  PGY1 Neurology

## 2024-03-14 NOTE — DISCHARGE INSTRUCTIONS
Dear Mr. Bashir,    You came to [facility] with complaints of worsening shortness of breath.  You were given diuretic medications to help you increase urine output.  Your symptoms improved significantly.  Our nephrology team saw you while you are here and recommended evaluation of-year-old right forearm dialysis graft.  You underwent an ultrasound which showed this fistula is no longer functioning.  You will need to go to your outpatient appointment on 3/21 to get a new fistula. We have also requested follow up with the kidney doctors. If you do not hear from them to schedule an appointment within the next few days, please call the scheduling line at [phone].     Your blood pressures were high while you were here, we went up on your hydralazine to 100mg three times a day.     New medications at discharge:  Hydralazine 100 mg 3 times daily  Bumex 2 mg twice daily    Follow-ups:  PCP, referral placed  Nephrology, referral placed, follow-up in 2 weeks  You will be called by the scheduling service to set up this appointment. If you do not hear from them within 3 days, please call [phone] to schedule the appointment.    Labs:  Please obtain CMP lab weekly for the next 2 weeks.  Orders for the labs are in the EMR, you can present to any  facility to have these drawn.    Please take all medications as prescribed, stay up-to-date with all vaccinations and cancer screenings.    Thank you for allowing us to care for you,   Care Team

## 2024-03-14 NOTE — PROGRESS NOTES
Transplant Nephrology progress note     Date of admission: 3/11/2024     Manolo Bashir is a 68 y.o.  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    HTN (hypertension)     Hyperlipidemia     Other long term (current) drug therapy 07/20/2021    High risk medication use    Personal history of other diseases of the circulatory system     Personal history of cardiac murmur    Personal history of other infectious and parasitic diseases 08/17/2015    History of hepatitis    Polyp, colonic 08/17/2023    Primary osteoarthritis of both ankles 08/17/2023    Prostate cancer (CMS/[facility])     Tubular adenoma of colon 08/17/2023    Unspecified kidney failure 08/17/2016    Renal failure        SUBJECTIVE:  Denied any complaints this morning.  Maintaining good urine output almost 1.3 L.  Denied any shortness of breath.      PROBLEM LIST:  Principal Problem:    Hypervolemia, unspecified hypervolemia type         ALLERGIES:  No Known Allergies         CURRENT MEDICATIONS:  Scheduled medications  aspirin, 81 mg, oral, Daily  azaTHIOprine, 25 mg, oral, Daily  bumetanide, 2 mg, intravenous, TID  calcitriol, 0.25 mcg, oral, Daily  carvedilol, 37.5 mg, oral, BID  cholecalciferol, 1,000 Units, oral, Daily  cinacalcet, 30 mg, oral, Daily  heparin (porcine), 5,000 Units, subcutaneous, q8h  hydrALAZINE, 25 mg, oral, q8h  insulin glargine, 5 Units, subcutaneous, q24h  insulin lispro, 0-5 Units, subcutaneous, TID with meals  iron sucrose, 200 mg, intravenous, Every other day  isosorbide mononitrate ER, 60 mg, oral, Daily  multivitamin with minerals, 1 tablet, oral,  "Daily  NIFEdipine ER, 60 mg, oral, BID  pantoprazole, 40 mg, oral, Daily before breakfast  polyethylene glycol, 17 g, oral, Daily  pravastatin, 10 mg, oral, Nightly  predniSONE, 5 mg, oral, Daily  sodium zirconium cyclosilicate, 5 g, oral, Daily  sulfamethoxazole-trimethoprim, 1 tablet, oral, Daily  tacrolimus, 1.5 mg, oral, q12h ZOË      Continuous medications     PRN medications  PRN medications: acetaminophen, dextrose 10 % in water (D10W), dextrose, docusate sodium, glucagon, ondansetron       OBJECTIVE:    VITALS: Visit Vitals  /71   Pulse 58   Temp 36.6 °C (97.9 °F) (Temporal)   Resp 18   Ht 1.727 m (5' 8\")   Wt 78.9 kg (174 lb)   SpO2 100%   BMI 26.46 kg/m²   Smoking Status Never   BSA 1.95 m²          General: No distress   Mucosa moist   AI, AC, AF     HEENT: PEERLA  CVS: S1 S2 no murmurs  RESP:  Lungs clear to auscultation   ABDO: Soft, non-tender   Neuro: A + O x 3  Skin: No rash   Extremities: No edema       LABS:  Results from last 72 hours   Lab Units 03/14/24  0728 03/13/24  0952   WBC AUTO x10*3/uL 2.3* 1.9*   HEMOGLOBIN g/dL 8.6* 8.4*   MCV fL 93 90   PLATELETS AUTO x10*3/uL 117* 92*   BUN mg/dL 58* 61*   CREATININE mg/dL 5.67* 6.05*   CALCIUM mg/dL 9.6 9.7   TACROLIMUS ng/mL  --  5.3              Intake/Output Summary (Last 24 hours) at 3/14/2024 1900  Last data filed at 3/14/2024 1100  Gross per 24 hour   Intake 320 ml   Output 1100 ml   Net -780 ml            ASSESSMENT AND PLAN:     Manolo Bashir is a 68 y.o.M with PMH ESRD (on HD 5810-2396), s/p 2x renal transplants (1992, 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    -Patient completed 2 doses of rituximab total of 2 g -1 /7/2024.  -Recent hospital admission as of 1/16/2024 with hypertensive urgency with fluid overload.  -Patient currently got admitted for volume overload with shortness of breath.  Transplant nephrology consulted for further management.     Allograft function:  -Continue Bumex for now and can convert him to 2 mg 3 times daily p.o.  Creatinine seems to be stabilizing versus a downtrending if still downtrending by tomorrow can discharge home.-pls consider checking urine lytes and can stop lokelma if potassium is<4.    -Right upper extremity AV fistula which is having good thrill but please consider ultrasound to evaluate for cannulation.  -Avoid nephrotoxins and renally dose medications.     Immunosuppression: Continue with the azathioprine, prednisone 5 mg, tacrolimus 1.5 twice daily.  Levels recently is around 2.8-3 please consider checking trough levels and will adjust her dose accordingly.     Hemodynamics: Blood pressures today is optimal.  Continue with the carvedilol, Imdur, nifedipine.     Bone mineral disease: Calcium and phosphorus levels are optimal continue with the current management.  Please consider checking PTH and vitamin D levels.     Anemia leukopenia: Agree with IV iron.  CMV and EBV pending Pls consider checking flow cytometry blood.          Thank you for consulting .  Dina Benoit MD       Notes created by Antoine -Please excuse the Typos .

## 2024-03-14 NOTE — PROGRESS NOTES
Transitional Care Coordinator Progress Note:   Per team, pt will have an ultrasound of his fistula today.  Await determination if pt will need HD initiated during this admission, SW following.  ADOD 3/18. Pt will discharge home with dtr. Care coordinator will continue to follow for discharge planning needs.     Linh Bueno MSN, RN-BC  Transitional Care Coordinator (TCC)  [phone]

## 2024-03-14 NOTE — CARE PLAN
Problem: Pain  Goal: My pain/discomfort is manageable  Outcome: Progressing     Problem: Safety  Goal: Patient will be injury free during hospitalization  Outcome: Progressing  Goal: I will remain free of falls  Outcome: Progressing     Problem: Daily Care  Goal: Daily care needs are met  Outcome: Progressing     Problem: Psychosocial Needs  Goal: Demonstrates ability to cope with hospitalization/illness  Outcome: Progressing  Goal: Collaborate with me, my family, and caregiver to identify my specific goals  Outcome: Progressing     Problem: Discharge Barriers  Goal: My discharge needs are met  Outcome: Progressing     Problem: Diabetes  Goal: Achieve decreasing blood glucose levels by end of shift  Outcome: Progressing  Goal: Increase stability of blood glucose readings by end of shift  Outcome: Progressing  Goal: Decrease in ketones present in urine by end of shift  Outcome: Progressing  Goal: Maintain electrolyte levels within acceptable range throughout shift  Outcome: Progressing  Goal: Maintain glucose levels >70mg/dl to <250mg/dl throughout shift  Outcome: Progressing  Goal: No changes in neurological exam by end of shift  Outcome: Progressing  Goal: Learn about and adhere to nutrition recommendations by end of shift  Outcome: Progressing  Goal: Vital signs within normal range for age by end of shift  Outcome: Progressing  Goal: Increase self care and/or family involovement by end of shift  Outcome: Progressing  Goal: Receive DSME education by end of shift  Outcome: Progressing     Problem: Pain - Adult  Goal: Verbalizes/displays adequate comfort level or baseline comfort level  Outcome: Progressing     Problem: Safety - Adult  Goal: Free from fall injury  Outcome: Progressing     Problem: Discharge Planning  Goal: Discharge to home or other facility with appropriate resources  Outcome: Progressing     Problem: Chronic Conditions and Co-morbidities  Goal: Patient's chronic conditions and co-morbidity  symptoms are monitored and maintained or improved  Outcome: Progressing   The patient's goals for the shift include comfort    The clinical goals for the shift include pt will remain free of falls during shift

## 2024-03-15 ENCOUNTER — APPOINTMENT (OUTPATIENT)
Dept: VASCULAR MEDICINE | Facility: HOSPITAL | Age: 69
End: 2024-03-15
Payer: COMMERCIAL

## 2024-03-15 LAB
25(OH)D3 SERPL-MCNC: 31 NG/ML (ref 30–100)
ALBUMIN SERPL BCP-MCNC: 3.3 G/DL (ref 3.4–5)
ANION GAP SERPL CALC-SCNC: 12 MMOL/L (ref 10–20)
BASOPHILS # BLD AUTO: 0.02 X10*3/UL (ref 0–0.1)
BASOPHILS NFR BLD AUTO: 0.9 %
BUN SERPL-MCNC: 52 MG/DL (ref 6–23)
CALCIUM SERPL-MCNC: 9.2 MG/DL (ref 8.6–10.6)
CHLORIDE SERPL-SCNC: 103 MMOL/L (ref 98–107)
CO2 SERPL-SCNC: 27 MMOL/L (ref 21–32)
CREAT SERPL-MCNC: 5.4 MG/DL (ref 0.5–1.3)
EGFRCR SERPLBLD CKD-EPI 2021: 11 ML/MIN/1.73M*2
EOSINOPHIL # BLD AUTO: 0.1 X10*3/UL (ref 0–0.7)
EOSINOPHIL NFR BLD AUTO: 4.5 %
ERYTHROCYTE [DISTWIDTH] IN BLOOD BY AUTOMATED COUNT: 15 % (ref 11.5–14.5)
GLUCOSE BLD MANUAL STRIP-MCNC: 163 MG/DL (ref 74–99)
GLUCOSE BLD MANUAL STRIP-MCNC: 227 MG/DL (ref 74–99)
GLUCOSE BLD MANUAL STRIP-MCNC: 227 MG/DL (ref 74–99)
GLUCOSE BLD MANUAL STRIP-MCNC: 232 MG/DL (ref 74–99)
GLUCOSE SERPL-MCNC: 264 MG/DL (ref 74–99)
HCT VFR BLD AUTO: 25.8 % (ref 41–52)
HGB BLD-MCNC: 8.3 G/DL (ref 13.5–17.5)
IMM GRANULOCYTES # BLD AUTO: 0.04 X10*3/UL (ref 0–0.7)
IMM GRANULOCYTES NFR BLD AUTO: 1.8 % (ref 0–0.9)
LYMPHOCYTES # BLD AUTO: 0.72 X10*3/UL (ref 1.2–4.8)
LYMPHOCYTES NFR BLD AUTO: 32.6 %
MAGNESIUM SERPL-MCNC: 1.95 MG/DL (ref 1.6–2.4)
MCH RBC QN AUTO: 29.1 PG (ref 26–34)
MCHC RBC AUTO-ENTMCNC: 32.2 G/DL (ref 32–36)
MCV RBC AUTO: 91 FL (ref 80–100)
MONOCYTES # BLD AUTO: 0.29 X10*3/UL (ref 0.1–1)
MONOCYTES NFR BLD AUTO: 13.1 %
NEUTROPHILS # BLD AUTO: 1.04 X10*3/UL (ref 1.2–7.7)
NEUTROPHILS NFR BLD AUTO: 47.1 %
NRBC BLD-RTO: 0 /100 WBCS (ref 0–0)
PHOSPHATE SERPL-MCNC: 3 MG/DL (ref 2.5–4.9)
PLATELET # BLD AUTO: 113 X10*3/UL (ref 150–450)
POTASSIUM SERPL-SCNC: 4.3 MMOL/L (ref 3.5–5.3)
RBC # BLD AUTO: 2.85 X10*6/UL (ref 4.5–5.9)
SODIUM SERPL-SCNC: 138 MMOL/L (ref 136–145)
TACROLIMUS BLD-MCNC: 3.5 NG/ML
WBC # BLD AUTO: 2.2 X10*3/UL (ref 4.4–11.3)

## 2024-03-15 PROCEDURE — 2500000002 HC RX 250 W HCPCS SELF ADMINISTERED DRUGS (ALT 637 FOR MEDICARE OP, ALT 636 FOR OP/ED)

## 2024-03-15 PROCEDURE — 2500000001 HC RX 250 WO HCPCS SELF ADMINISTERED DRUGS (ALT 637 FOR MEDICARE OP)

## 2024-03-15 PROCEDURE — 1100000001 HC PRIVATE ROOM DAILY

## 2024-03-15 PROCEDURE — RXMED WILLOW AMBULATORY MEDICATION CHARGE

## 2024-03-15 PROCEDURE — 2500000004 HC RX 250 GENERAL PHARMACY W/ HCPCS (ALT 636 FOR OP/ED)

## 2024-03-15 PROCEDURE — 85025 COMPLETE CBC W/AUTO DIFF WBC: CPT

## 2024-03-15 PROCEDURE — 80197 ASSAY OF TACROLIMUS: CPT

## 2024-03-15 PROCEDURE — 93990 DOPPLER FLOW TESTING: CPT | Performed by: INTERNAL MEDICINE

## 2024-03-15 PROCEDURE — 99233 SBSQ HOSP IP/OBS HIGH 50: CPT | Performed by: HOSPITALIST

## 2024-03-15 PROCEDURE — 93990 DOPPLER FLOW TESTING: CPT

## 2024-03-15 PROCEDURE — 36415 COLL VENOUS BLD VENIPUNCTURE: CPT

## 2024-03-15 PROCEDURE — 2500000001 HC RX 250 WO HCPCS SELF ADMINISTERED DRUGS (ALT 637 FOR MEDICARE OP): Performed by: STUDENT IN AN ORGANIZED HEALTH CARE EDUCATION/TRAINING PROGRAM

## 2024-03-15 PROCEDURE — 2500000002 HC RX 250 W HCPCS SELF ADMINISTERED DRUGS (ALT 637 FOR MEDICARE OP, ALT 636 FOR OP/ED): Performed by: STUDENT IN AN ORGANIZED HEALTH CARE EDUCATION/TRAINING PROGRAM

## 2024-03-15 PROCEDURE — 83735 ASSAY OF MAGNESIUM: CPT

## 2024-03-15 PROCEDURE — 80069 RENAL FUNCTION PANEL: CPT

## 2024-03-15 PROCEDURE — 82947 ASSAY GLUCOSE BLOOD QUANT: CPT

## 2024-03-15 PROCEDURE — 99232 SBSQ HOSP IP/OBS MODERATE 35: CPT

## 2024-03-15 RX ORDER — PRAVASTATIN SODIUM 10 MG/1
10 TABLET ORAL NIGHTLY
Qty: 30 TABLET | Refills: 1 | Status: SHIPPED | OUTPATIENT
Start: 2024-03-15 | End: 2024-05-16

## 2024-03-15 RX ORDER — CHLORHEXIDINE GLUCONATE 40 MG/ML
SOLUTION TOPICAL DAILY PRN
Qty: 236 ML | Refills: 0 | Status: SHIPPED | OUTPATIENT
Start: 2024-03-15 | End: 2024-04-14

## 2024-03-15 RX ORDER — BUMETANIDE 2 MG/1
2 TABLET ORAL 3 TIMES DAILY
Qty: 90 TABLET | Refills: 0 | Status: SHIPPED | OUTPATIENT
Start: 2024-03-15 | End: 2024-04-06 | Stop reason: HOSPADM

## 2024-03-15 RX ORDER — BUMETANIDE 2 MG/1
2 TABLET ORAL DAILY
Status: DISCONTINUED | OUTPATIENT
Start: 2024-03-15 | End: 2024-03-16 | Stop reason: HOSPADM

## 2024-03-15 RX ORDER — HYDRALAZINE HYDROCHLORIDE 25 MG/1
50 TABLET, FILM COATED ORAL EVERY 8 HOURS
Status: DISCONTINUED | OUTPATIENT
Start: 2024-03-15 | End: 2024-03-15

## 2024-03-15 RX ORDER — HYDRALAZINE HYDROCHLORIDE 25 MG/1
100 TABLET, FILM COATED ORAL EVERY 8 HOURS
Status: DISCONTINUED | OUTPATIENT
Start: 2024-03-15 | End: 2024-03-16 | Stop reason: HOSPADM

## 2024-03-15 RX ORDER — HYDRALAZINE HYDROCHLORIDE 100 MG/1
50 TABLET, FILM COATED ORAL 3 TIMES DAILY
Qty: 45 TABLET | Refills: 0 | Status: SHIPPED | OUTPATIENT
Start: 2024-03-15 | End: 2024-04-06 | Stop reason: HOSPADM

## 2024-03-15 RX ORDER — TACROLIMUS 0.5 MG/1
CAPSULE, GELATIN COATED ORAL
Qty: 210 CAPSULE | Refills: 2 | Status: SHIPPED | OUTPATIENT
Start: 2024-03-15 | End: 2024-05-19 | Stop reason: HOSPADM

## 2024-03-15 RX ORDER — TACROLIMUS 1 MG/1
2 CAPSULE ORAL
Status: DISCONTINUED | OUTPATIENT
Start: 2024-03-15 | End: 2024-03-16 | Stop reason: HOSPADM

## 2024-03-15 RX ORDER — NIFEDIPINE 60 MG/1
60 TABLET, FILM COATED, EXTENDED RELEASE ORAL 2 TIMES DAILY
Qty: 60 TABLET | Refills: 1 | Status: SHIPPED | OUTPATIENT
Start: 2024-03-15 | End: 2024-06-09

## 2024-03-15 RX ADMIN — ACETAMINOPHEN 975 MG: 325 TABLET ORAL at 20:43

## 2024-03-15 RX ADMIN — HYDRALAZINE HYDROCHLORIDE 25 MG: 25 TABLET ORAL at 02:22

## 2024-03-15 RX ADMIN — INSULIN LISPRO 2 UNITS: 100 INJECTION, SOLUTION INTRAVENOUS; SUBCUTANEOUS at 18:19

## 2024-03-15 RX ADMIN — Medication 1000 UNITS: at 10:07

## 2024-03-15 RX ADMIN — ISOSORBIDE MONONITRATE 60 MG: 60 TABLET, EXTENDED RELEASE ORAL at 10:07

## 2024-03-15 RX ADMIN — AZATHIOPRINE 25 MG: 50 TABLET ORAL at 10:08

## 2024-03-15 RX ADMIN — BUMETANIDE 2 MG: 2 TABLET ORAL at 10:13

## 2024-03-15 RX ADMIN — Medication 1 TABLET: at 10:08

## 2024-03-15 RX ADMIN — INSULIN LISPRO 2 UNITS: 100 INJECTION, SOLUTION INTRAVENOUS; SUBCUTANEOUS at 10:13

## 2024-03-15 RX ADMIN — TACROLIMUS 1.5 MG: 0.5 CAPSULE ORAL at 06:11

## 2024-03-15 RX ADMIN — PREDNISONE 5 MG: 5 TABLET ORAL at 10:08

## 2024-03-15 RX ADMIN — ASPIRIN 81 MG 81 MG: 81 TABLET ORAL at 10:07

## 2024-03-15 RX ADMIN — HYDRALAZINE HYDROCHLORIDE 50 MG: 25 TABLET ORAL at 10:07

## 2024-03-15 RX ADMIN — CARVEDILOL 37.5 MG: 25 TABLET, FILM COATED ORAL at 20:44

## 2024-03-15 RX ADMIN — CALCITRIOL CAPSULES 0.25 MCG 0.25 MCG: 0.25 CAPSULE ORAL at 10:07

## 2024-03-15 RX ADMIN — NIFEDIPINE 60 MG: 60 TABLET, FILM COATED, EXTENDED RELEASE ORAL at 10:13

## 2024-03-15 RX ADMIN — CINACALCET 30 MG: 30 TABLET, FILM COATED ORAL at 10:08

## 2024-03-15 RX ADMIN — INSULIN GLARGINE 5 UNITS: 100 INJECTION, SOLUTION SUBCUTANEOUS at 21:03

## 2024-03-15 RX ADMIN — PRAVASTATIN SODIUM 10 MG: 20 TABLET ORAL at 20:44

## 2024-03-15 RX ADMIN — TACROLIMUS 2 MG: 1 CAPSULE ORAL at 18:14

## 2024-03-15 RX ADMIN — NIFEDIPINE 60 MG: 60 TABLET, FILM COATED, EXTENDED RELEASE ORAL at 20:44

## 2024-03-15 RX ADMIN — HYDRALAZINE HYDROCHLORIDE 100 MG: 25 TABLET ORAL at 18:14

## 2024-03-15 RX ADMIN — SODIUM ZIRCONIUM CYCLOSILICATE 5 G: 5 POWDER, FOR SUSPENSION ORAL at 10:07

## 2024-03-15 RX ADMIN — PANTOPRAZOLE SODIUM 40 MG: 40 TABLET, DELAYED RELEASE ORAL at 06:11

## 2024-03-15 RX ADMIN — SULFAMETHOXAZOLE AND TRIMETHOPRIM 1 TABLET: 400; 80 TABLET ORAL at 10:08

## 2024-03-15 RX ADMIN — CARVEDILOL 37.5 MG: 25 TABLET, FILM COATED ORAL at 10:07

## 2024-03-15 RX ADMIN — IRON SUCROSE 200 MG: 20 INJECTION, SOLUTION INTRAVENOUS at 10:15

## 2024-03-15 ASSESSMENT — PAIN DESCRIPTION - LOCATION: LOCATION: BACK

## 2024-03-15 ASSESSMENT — PAIN SCALES - GENERAL: PAINLEVEL_OUTOF10: 4

## 2024-03-15 ASSESSMENT — PAIN DESCRIPTION - ORIENTATION: ORIENTATION: LOWER

## 2024-03-15 ASSESSMENT — PAIN - FUNCTIONAL ASSESSMENT: PAIN_FUNCTIONAL_ASSESSMENT: 0-10

## 2024-03-15 NOTE — PROGRESS NOTES
Transplant Nephrology progress note     Date of admission: 3/11/2024     Manolo Bashir is a 68 y.o.  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  Maintaining good urine output.  Denied any complaints.    PROBLEM LIST:  Principal Problem:    Hypervolemia, unspecified hypervolemia type         ALLERGIES:  No Known Allergies         CURRENT MEDICATIONS:  Scheduled medications  aspirin, 81 mg, oral, Daily  azaTHIOprine, 25 mg, oral, Daily  bumetanide, 2 mg, oral, Daily  calcitriol, 0.25 mcg, oral, Daily  carvedilol, 37.5 mg, oral, BID  cholecalciferol, 1,000 Units, oral, Daily  cinacalcet, 30 mg, oral, Daily  heparin (porcine), 5,000 Units, subcutaneous, q8h  hydrALAZINE, 50 mg, oral, q8h  insulin glargine, 5 Units, subcutaneous, q24h  insulin lispro, 0-5 Units, subcutaneous, TID with meals  iron sucrose, 200 mg, intravenous, Every other day  isosorbide mononitrate ER, 60 mg, oral, Daily  multivitamin with minerals, 1 tablet, oral, Daily  NIFEdipine ER, 60 mg, oral, BID  pantoprazole, 40 mg, oral, Daily  "before breakfast  polyethylene glycol, 17 g, oral, Daily  pravastatin, 10 mg, oral, Nightly  predniSONE, 5 mg, oral, Daily  sodium zirconium cyclosilicate, 5 g, oral, Daily  sulfamethoxazole-trimethoprim, 1 tablet, oral, Daily  tacrolimus, 1.5 mg, oral, q12h ZOË      Continuous medications     PRN medications  PRN medications: acetaminophen, dextrose 10 % in water (D10W), dextrose, docusate sodium, glucagon, ondansetron       OBJECTIVE:    VITALS: Visit Vitals  BP (!) 184/77   Pulse 64   Temp 36.8 °C (98.2 °F)   Resp 16   Ht 1.727 m (5' 8\")   Wt 78.9 kg (174 lb)   SpO2 98%   BMI 26.46 kg/m²   Smoking Status Never   BSA 1.95 m²          General: No distress   Mucosa moist   AI, AC, AF     HEENT: PEERLA  CVS: S1 S2 no murmurs  RESP:  Lungs clear to auscultation   ABDO: Soft, non-tender   Neuro: A + O x 3  Skin: No rash   Extremities: No edema       LABS:  Results from last 72 hours   Lab Units 03/15/24  0621   WBC AUTO x10*3/uL 2.2*   HEMOGLOBIN g/dL 8.3*   MCV fL 91   PLATELETS AUTO x10*3/uL 113*   BUN mg/dL 52*   CREATININE mg/dL 5.40*   CALCIUM mg/dL 9.2   TACROLIMUS ng/mL 3.5            No intake or output data in the 24 hours ending 03/15/24 1208         ASSESSMENT AND PLAN:     Manolo Bashir is a 68 y.o.M with PMH ESRD (on HD [id number]), s/p 2x renal transplants (1992, 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    -Patient completed 2 doses of rituximab total of 2 g -1 /7/2024.  -Recent hospital admission as of 1/16/2024 " with hypertensive urgency with fluid overload.  -Patient currently got admitted for volume overload with shortness of breath.  Transplant nephrology consulted for further management.     Allograft function:  -Continue Bumex f 2 mg 3 times daily p.o.  Creatinine seems to be stabilizing versus a downtrending -can discharge home with follow-up outpatient in 2 weeks, labs weekly.-pls consider checking urine lytes and can stop lokelma if potassium is<4.    -Right upper extremity AV fistula which is having good thrill , can get ultrasound here or can be evaluated outpatient.     Immunosuppression: Continue with the azathioprine, prednisone 5 mg, tacrolimus 1.5 twice daily.  Levels recently is around 2.8-3 -can change tacrolimus dose to 1.5 mg in the morning and 2 mg in the evening.    Hemodynamics: Blood pressures today is optimal.  Continue with the carvedilol, Imdur, nifedipine.-Consider increasing hydralazine to 75 3 times daily.     Bone mineral disease: Calcium and phosphorus levels are optimal continue with the current management.  Please consider checking PTH and vitamin D levels.     Anemia leukopenia: Agree with IV iron.  CMV check was negative, BK and EBV pending.          Thank you for consulting .  Dina Benoit MD       Notes created by Antoine -Please excuse the Typos .

## 2024-03-15 NOTE — PROGRESS NOTES
Subjective   Reports of 2 loose bowel movements overnight.  C. difficile labs sent overnight.    This morning patient reports that he feels the diarrhea was caused by an eating grapes.  Reports that it has resolved this morning that he had a soft or formed bowel movement.  Shortness of breath has improved from presentation and remained stable.  On board with plan to switch to p.o. hydralazine, remove IV and repeat right upper extremity ultrasound for old graft evaluation.      Objective   Vitals:    03/14/24 2012   BP: (!) 184/77   Pulse: 64   Resp: 16   Temp: 36.8 °C (98.2 °F)   SpO2: 98%       Physical Exam  Constitutional:       Appearance: He is not toxic-appearing.   Eyes:      Conjunctiva/sclera: Conjunctivae normal.      Pupils: Pupils are equal, round, and reactive to light.   Cardiovascular:      Rate and Rhythm: Normal rate and regular rhythm.      Heart sounds: Murmur heard.      Comments: Systolic murmur best appreciated at left sternal border.  Pulmonary:      Effort: Pulmonary effort is normal.      Breath sounds: Rales present.      Comments: Decreased basilar breath sounds bilaterally. Some inspiratory rales present.  Abdominal:      General: Abdomen is flat. Bowel sounds are normal. There is no distension.      Palpations: Abdomen is soft.   Musculoskeletal:      Cervical back: Normal range of motion.      Right lower leg: No edema.      Left lower leg: No edema.   Skin:     General: Skin is warm.   Neurological:      General: No focal deficit present.      Mental Status: He is alert.   Psychiatric:         Mood and Affect: Mood normal.         Behavior: Behavior normal.       Relevant Results    Scheduled medications  aspirin, 81 mg, oral, Daily  azaTHIOprine, 25 mg, oral, Daily  bumetanide, 2 mg, oral, Daily  calcitriol, 0.25 mcg, oral, Daily  carvedilol, 37.5 mg, oral, BID  cholecalciferol, 1,000 Units, oral, Daily  cinacalcet, 30 mg, oral, Daily  heparin (porcine), 5,000 Units, subcutaneous,  q8h  hydrALAZINE, 50 mg, oral, q8h  insulin glargine, 5 Units, subcutaneous, q24h  insulin lispro, 0-5 Units, subcutaneous, TID with meals  iron sucrose, 200 mg, intravenous, Every other day  isosorbide mononitrate ER, 60 mg, oral, Daily  multivitamin with minerals, 1 tablet, oral, Daily  NIFEdipine ER, 60 mg, oral, BID  pantoprazole, 40 mg, oral, Daily before breakfast  polyethylene glycol, 17 g, oral, Daily  pravastatin, 10 mg, oral, Nightly  predniSONE, 5 mg, oral, Daily  sodium zirconium cyclosilicate, 5 g, oral, Daily  sulfamethoxazole-trimethoprim, 1 tablet, oral, Daily  tacrolimus, 1.5 mg, oral, q12h ZOË      Continuous medications       PRN medications  PRN medications: acetaminophen, dextrose 10 % in water (D10W), dextrose, docusate sodium, glucagon, ondansetron     Results from last 7 days   Lab Units 03/15/24  0621 03/14/24  0728 03/13/24  0952   WBC AUTO x10*3/uL 2.2* 2.3* 1.9*   HEMOGLOBIN g/dL 8.3* 8.6* 8.4*   HEMATOCRIT % 25.8* 27.2* 26.4*   PLATELETS AUTO x10*3/uL 113* 117* 92*       Results from last 7 days   Lab Units 03/15/24  0621 03/14/24  0728 03/13/24  0952   SODIUM mmol/L 138 139 139   POTASSIUM mmol/L 4.3 4.3 4.3   CO2 mmol/L 27 26 26   ANION GAP mmol/L 12 13 12   BUN mg/dL 52* 58* 61*   CREATININE mg/dL 5.40* 5.67* 6.05*   GLUCOSE mg/dL 264* 149* 196*   EGFR mL/min/1.73m*2 11* 10* 10*   MAGNESIUM mg/dL 1.95 2.12 1.78   PHOSPHORUS mg/dL 3.0 3.0 3.3       Results from last 7 days   Lab Units 03/11/24  0431   ALT U/L 44   AST U/L 45*   ALK PHOS U/L 47        Results from last 7 days   Lab Units 03/11/24  0431   LIPASE U/L 9       Relevant Imaging  XR chest 1 view    1. Extensive patchy airspace opacities throughout the right lung with obscuration of the right costophrenic angle. There are prominent perihilar interstitial markings bilaterally. Findings in the setting of cardiomegaly likely represent right pleural effusion and pulmonary edema. Probable small left pleural effusion also noted.  Multifocal infection can not be excluded.     Assessment and Plan:  Manolo Bashir is a 68-year-old male with a past medical history of ESRD s/p renal transplant x 2 in 1992 in 2013 on immunosuppression, MGUS, pancytopenia, diabetes, hypertension, prostate cancer s/p radical prostatectomy, HCV fully treated with RNA not detected 10/18/2023 presenting to the emergency department with shortness of breath.  CXR concerning for fluid overload with pleural effusions.  This is likely fluid overload in setting of CKD, excess salt water and discontinuation of hydralazine. Transplant nephrology following for possible initiation of dialysis. Pending U/S of old R forearm graft for evaluation of patency.     Updates 3/13:  - R upper extremity US to assess cannulation of AV fistula for dialysis access today   -Study was attempted yesterday, unable to complete 2/2 IV lines, remove IVs today  -Bumex 2 mg oral daily  -Increase hydralazine to 50 mg 3 times daily p.o.  -Possible discharge today    #Fluid overload   #hypertension  :: TTE 11/18 showed EF 60 to 65%, left ventricular cavity moderately dilated, severe concentric left ventricular hypertrophy, mild to moderate aortic valve regurgitation  - Increased hydralazine to 50 mg PO TID  - Continue home Amlodipine 10 mg PO daily  - Continue home Carvedilol 37.5mg PO BID   - Continue home Imdur 60 mg  - Bumex 2 mg  - Strict I's and O's  - Daily weights  - Monitor need for Cedeño placement given prostatectomy history    #Anemia   #Leukopenia  ::3/12 Hgb at 7.5; baseline ~10. WBC 2.3   - Transplant consulted, recommended iron studies, B12, folate  - Order type and screen   >Consider transfusion if Hbg < 7 and symptomatic  - IV venofer 200mg every other day for 5 doses  - Will consider flow cytometry  - EBV and CMV negative    #ESRD s/p kidney transplant  :: impaired allograft function, CKD stage 4-5  - Continue home Tacorlimus 1.5mg BID; monitor trough levels  - Continue home Prednisone  5mg daily  - Continue home Imuran 25mg daily  - Continue home Bactrim  - Continue home Sensipar and vitamin D3/calcitriol   - Pending R upper extremity US of AV fistula to assess cannulation     #hyperlipidemia  - Continue home pravastatin 40 mg nightly     #CAD  - Continue home ASA 81     #DM  - Low dose sliding scale  - 5U glargine at night     #Hx of Back pain  ::MRI 09/19/2023 showing nonspecific focus of abnormal signal in R pedicle of L4 c/w osseous hemangioma, stress fracture, or osteoid osteoma. No discitis or osteomyelitis. No evidence of metastasis.  -Tylenol 650 mg q8h PRN     F: PRN  E: PRN  N: Regular  A: PIV     Full Code  NOK: Amalia     Hu Porter MD  PGY1 Neurology

## 2024-03-16 ENCOUNTER — PHARMACY VISIT (OUTPATIENT)
Dept: PHARMACY | Facility: CLINIC | Age: 69
End: 2024-03-16
Payer: MEDICAID

## 2024-03-16 VITALS
OXYGEN SATURATION: 98 % | DIASTOLIC BLOOD PRESSURE: 75 MMHG | WEIGHT: 174 LBS | BODY MASS INDEX: 26.37 KG/M2 | HEART RATE: 72 BPM | SYSTOLIC BLOOD PRESSURE: 167 MMHG | HEIGHT: 68 IN | RESPIRATION RATE: 17 BRPM | TEMPERATURE: 97.9 F

## 2024-03-16 LAB
ALBUMIN SERPL BCP-MCNC: 3 G/DL (ref 3.4–5)
ANION GAP SERPL CALC-SCNC: 11 MMOL/L (ref 10–20)
BASOPHILS # BLD AUTO: 0.01 X10*3/UL (ref 0–0.1)
BASOPHILS NFR BLD AUTO: 0.4 %
BUN SERPL-MCNC: 53 MG/DL (ref 6–23)
CALCIUM SERPL-MCNC: 9 MG/DL (ref 8.6–10.6)
CHLORIDE SERPL-SCNC: 106 MMOL/L (ref 98–107)
CHLORIDE UR-SCNC: 66 MMOL/L
CHLORIDE/CREATININE (MMOL/G) IN URINE: 74 MMOL/G CREAT (ref 23–275)
CO2 SERPL-SCNC: 27 MMOL/L (ref 21–32)
CREAT SERPL-MCNC: 5.22 MG/DL (ref 0.5–1.3)
CREAT UR-MCNC: 88.7 MG/DL (ref 20–370)
EGFRCR SERPLBLD CKD-EPI 2021: 11 ML/MIN/1.73M*2
EOSINOPHIL # BLD AUTO: 0.09 X10*3/UL (ref 0–0.7)
EOSINOPHIL NFR BLD AUTO: 3.2 %
ERYTHROCYTE [DISTWIDTH] IN BLOOD BY AUTOMATED COUNT: 15.3 % (ref 11.5–14.5)
GLUCOSE BLD MANUAL STRIP-MCNC: 154 MG/DL (ref 74–99)
GLUCOSE BLD MANUAL STRIP-MCNC: 157 MG/DL (ref 74–99)
GLUCOSE SERPL-MCNC: 190 MG/DL (ref 74–99)
HCT VFR BLD AUTO: 23.7 % (ref 41–52)
HGB BLD-MCNC: 7.9 G/DL (ref 13.5–17.5)
IMM GRANULOCYTES # BLD AUTO: 0.05 X10*3/UL (ref 0–0.7)
IMM GRANULOCYTES NFR BLD AUTO: 1.8 % (ref 0–0.9)
LYMPHOCYTES # BLD AUTO: 0.73 X10*3/UL (ref 1.2–4.8)
LYMPHOCYTES NFR BLD AUTO: 26 %
MAGNESIUM SERPL-MCNC: 1.77 MG/DL (ref 1.6–2.4)
MCH RBC QN AUTO: 29.6 PG (ref 26–34)
MCHC RBC AUTO-ENTMCNC: 33.3 G/DL (ref 32–36)
MCV RBC AUTO: 89 FL (ref 80–100)
MONOCYTES # BLD AUTO: 0.39 X10*3/UL (ref 0.1–1)
MONOCYTES NFR BLD AUTO: 13.9 %
NEUTROPHILS # BLD AUTO: 1.54 X10*3/UL (ref 1.2–7.7)
NEUTROPHILS NFR BLD AUTO: 54.7 %
NRBC BLD-RTO: 0 /100 WBCS (ref 0–0)
PHOSPHATE SERPL-MCNC: 2.4 MG/DL (ref 2.5–4.9)
PLATELET # BLD AUTO: 100 X10*3/UL (ref 150–450)
POTASSIUM SERPL-SCNC: 4.2 MMOL/L (ref 3.5–5.3)
POTASSIUM UR-SCNC: 35 MMOL/L
POTASSIUM/CREAT UR-RTO: 39 MMOL/G CREAT
PTH-INTACT SERPL-MCNC: 257.7 PG/ML (ref 18.5–88)
RBC # BLD AUTO: 2.67 X10*6/UL (ref 4.5–5.9)
SODIUM SERPL-SCNC: 140 MMOL/L (ref 136–145)
SODIUM UR-SCNC: 98 MMOL/L
SODIUM/CREAT UR-RTO: 110 MMOL/G CREAT
WBC # BLD AUTO: 2.8 X10*3/UL (ref 4.4–11.3)

## 2024-03-16 PROCEDURE — RXMED WILLOW AMBULATORY MEDICATION CHARGE

## 2024-03-16 PROCEDURE — 82947 ASSAY GLUCOSE BLOOD QUANT: CPT

## 2024-03-16 PROCEDURE — 83735 ASSAY OF MAGNESIUM: CPT

## 2024-03-16 PROCEDURE — 80069 RENAL FUNCTION PANEL: CPT

## 2024-03-16 PROCEDURE — 2500000005 HC RX 250 GENERAL PHARMACY W/O HCPCS

## 2024-03-16 PROCEDURE — 2500000001 HC RX 250 WO HCPCS SELF ADMINISTERED DRUGS (ALT 637 FOR MEDICARE OP): Performed by: STUDENT IN AN ORGANIZED HEALTH CARE EDUCATION/TRAINING PROGRAM

## 2024-03-16 PROCEDURE — 2500000002 HC RX 250 W HCPCS SELF ADMINISTERED DRUGS (ALT 637 FOR MEDICARE OP, ALT 636 FOR OP/ED): Performed by: STUDENT IN AN ORGANIZED HEALTH CARE EDUCATION/TRAINING PROGRAM

## 2024-03-16 PROCEDURE — 2500000001 HC RX 250 WO HCPCS SELF ADMINISTERED DRUGS (ALT 637 FOR MEDICARE OP)

## 2024-03-16 PROCEDURE — 83970 ASSAY OF PARATHORMONE: CPT | Performed by: STUDENT IN AN ORGANIZED HEALTH CARE EDUCATION/TRAINING PROGRAM

## 2024-03-16 PROCEDURE — 36415 COLL VENOUS BLD VENIPUNCTURE: CPT

## 2024-03-16 PROCEDURE — 99239 HOSP IP/OBS DSCHRG MGMT >30: CPT | Performed by: INTERNAL MEDICINE

## 2024-03-16 PROCEDURE — 85025 COMPLETE CBC W/AUTO DIFF WBC: CPT

## 2024-03-16 PROCEDURE — 82436 ASSAY OF URINE CHLORIDE: CPT

## 2024-03-16 PROCEDURE — 2500000004 HC RX 250 GENERAL PHARMACY W/ HCPCS (ALT 636 FOR OP/ED)

## 2024-03-16 PROCEDURE — 2500000002 HC RX 250 W HCPCS SELF ADMINISTERED DRUGS (ALT 637 FOR MEDICARE OP, ALT 636 FOR OP/ED)

## 2024-03-16 RX ORDER — ACETAMINOPHEN 325 MG/1
975 TABLET ORAL EVERY 8 HOURS PRN
Status: DISCONTINUED | OUTPATIENT
Start: 2024-03-16 | End: 2024-03-16 | Stop reason: HOSPADM

## 2024-03-16 RX ORDER — LIDOCAINE 560 MG/1
1 PATCH PERCUTANEOUS; TOPICAL; TRANSDERMAL DAILY
Status: DISCONTINUED | OUTPATIENT
Start: 2024-03-16 | End: 2024-03-16 | Stop reason: HOSPADM

## 2024-03-16 RX ORDER — OXYCODONE HYDROCHLORIDE 5 MG/1
5 TABLET ORAL ONCE
Status: COMPLETED | OUTPATIENT
Start: 2024-03-16 | End: 2024-03-16

## 2024-03-16 RX ORDER — HYDRALAZINE HYDROCHLORIDE 100 MG/1
100 TABLET, FILM COATED ORAL EVERY 8 HOURS
Qty: 90 TABLET | Refills: 0 | Status: SHIPPED | OUTPATIENT
Start: 2024-03-16 | End: 2024-05-12

## 2024-03-16 RX ADMIN — HYDRALAZINE HYDROCHLORIDE 100 MG: 25 TABLET ORAL at 09:41

## 2024-03-16 RX ADMIN — OXYCODONE HYDROCHLORIDE 5 MG: 5 TABLET ORAL at 15:31

## 2024-03-16 RX ADMIN — SULFAMETHOXAZOLE AND TRIMETHOPRIM 1 TABLET: 400; 80 TABLET ORAL at 09:45

## 2024-03-16 RX ADMIN — ISOSORBIDE MONONITRATE 60 MG: 60 TABLET, EXTENDED RELEASE ORAL at 09:44

## 2024-03-16 RX ADMIN — PANTOPRAZOLE SODIUM 40 MG: 40 TABLET, DELAYED RELEASE ORAL at 06:28

## 2024-03-16 RX ADMIN — CARVEDILOL 37.5 MG: 25 TABLET, FILM COATED ORAL at 09:43

## 2024-03-16 RX ADMIN — LIDOCAINE 1 PATCH: 4 PATCH TOPICAL at 09:46

## 2024-03-16 RX ADMIN — Medication 1000 UNITS: at 09:43

## 2024-03-16 RX ADMIN — BUMETANIDE 2 MG: 2 TABLET ORAL at 09:46

## 2024-03-16 RX ADMIN — INSULIN LISPRO 1 UNITS: 100 INJECTION, SOLUTION INTRAVENOUS; SUBCUTANEOUS at 09:48

## 2024-03-16 RX ADMIN — TACROLIMUS 1.5 MG: 1 CAPSULE ORAL at 06:27

## 2024-03-16 RX ADMIN — CINACALCET 30 MG: 30 TABLET, FILM COATED ORAL at 09:42

## 2024-03-16 RX ADMIN — HYDRALAZINE HYDROCHLORIDE 100 MG: 25 TABLET ORAL at 02:19

## 2024-03-16 RX ADMIN — ONDANSETRON HYDROCHLORIDE 4 MG: 4 TABLET, FILM COATED ORAL at 14:52

## 2024-03-16 RX ADMIN — NIFEDIPINE 60 MG: 60 TABLET, FILM COATED, EXTENDED RELEASE ORAL at 09:46

## 2024-03-16 RX ADMIN — ASPIRIN 81 MG 81 MG: 81 TABLET ORAL at 09:42

## 2024-03-16 RX ADMIN — CALCITRIOL CAPSULES 0.25 MCG 0.25 MCG: 0.25 CAPSULE ORAL at 09:46

## 2024-03-16 RX ADMIN — ACETAMINOPHEN 975 MG: 325 TABLET ORAL at 06:38

## 2024-03-16 RX ADMIN — AZATHIOPRINE 25 MG: 50 TABLET ORAL at 09:45

## 2024-03-16 RX ADMIN — Medication 1 TABLET: at 09:42

## 2024-03-16 RX ADMIN — ACETAMINOPHEN 975 MG: 325 TABLET ORAL at 13:24

## 2024-03-16 RX ADMIN — PREDNISONE 5 MG: 5 TABLET ORAL at 09:43

## 2024-03-16 ASSESSMENT — COGNITIVE AND FUNCTIONAL STATUS - GENERAL
DAILY ACTIVITIY SCORE: 24
MOBILITY SCORE: 24

## 2024-03-16 ASSESSMENT — PAIN - FUNCTIONAL ASSESSMENT
PAIN_FUNCTIONAL_ASSESSMENT: 0-10

## 2024-03-16 ASSESSMENT — PAIN DESCRIPTION - ORIENTATION: ORIENTATION: LOWER

## 2024-03-16 ASSESSMENT — PAIN SCALES - GENERAL
PAINLEVEL_OUTOF10: 10 - WORST POSSIBLE PAIN
PAINLEVEL_OUTOF10: 10 - WORST POSSIBLE PAIN
PAINLEVEL_OUTOF10: 0 - NO PAIN
PAINLEVEL_OUTOF10: 4

## 2024-03-16 ASSESSMENT — PAIN DESCRIPTION - LOCATION: LOCATION: BACK

## 2024-03-16 NOTE — PROGRESS NOTES
Subjective   Manolo Bashir is a 68 y.o. male on hospital day 5 reports he is feeling and breathing well like to be discharged.  He does state he has some back pain from the bed and is getting agitated from being in the hospital too long which she believes is driving up his blood pressure    Objective     Exam     Vitals:    03/15/24 2043 03/16/24 0632 03/16/24 1224 03/16/24 1439   BP: (!) 186/80 161/75 157/71 167/75   Pulse: 76 72 67 72   Resp:  18 17 17   Temp: 36.7 °C (98.1 °F) 36.6 °C (97.9 °F)     TempSrc:  Temporal     SpO2:  99% 98%    Weight:       Height:          Intake/Output last 3 shifts:  I/O last 3 completed shifts:  In: - (0 mL/kg)   Out: 300 (3.8 mL/kg) [Urine:300 (0.1 mL/kg/hr)]  Weight: 78.9 kg     Physical Exam  Vitals reviewed.   Constitutional:       General: He is not in acute distress.     Appearance: Normal appearance. He is not ill-appearing, toxic-appearing or diaphoretic.      Comments: Breathing smooth and calm.     HENT:      Head: Normocephalic and atraumatic.   Cardiovascular:      Rate and Rhythm: Normal rate and regular rhythm.      Pulses: Normal pulses.      Heart sounds: Murmur (2 out of 6 systolic murmur heard greatest over the left upper sternal border) heard.      No friction rub. No gallop.   Pulmonary:      Effort: Pulmonary effort is normal.      Breath sounds: Normal breath sounds. No wheezing, rhonchi or rales.   Abdominal:      General: Bowel sounds are normal. There is no distension.      Palpations: Abdomen is soft.      Tenderness: There is no abdominal tenderness. There is no guarding or rebound.   Musculoskeletal:      Right lower leg: No edema.      Left lower leg: No edema.   Skin:     General: Skin is warm and dry.   Neurological:      General: No focal deficit present.      Mental Status: He is alert.      Comments: Nonfocal   Psychiatric:         Mood and Affect: Mood normal.         Behavior: Behavior normal.            Medications   aspirin, 81 mg, oral,  Daily  azaTHIOprine, 25 mg, oral, Daily  bumetanide, 2 mg, oral, Daily  calcitriol, 0.25 mcg, oral, Daily  carvedilol, 37.5 mg, oral, BID  cholecalciferol, 1,000 Units, oral, Daily  cinacalcet, 30 mg, oral, Daily  heparin (porcine), 5,000 Units, subcutaneous, q8h  hydrALAZINE, 100 mg, oral, q8h  insulin glargine, 5 Units, subcutaneous, q24h  insulin lispro, 0-5 Units, subcutaneous, TID with meals  iron sucrose, 200 mg, intravenous, Every other day  isosorbide mononitrate ER, 60 mg, oral, Daily  lidocaine, 1 patch, transdermal, Daily  multivitamin with minerals, 1 tablet, oral, Daily  NIFEdipine ER, 60 mg, oral, BID  pantoprazole, 40 mg, oral, Daily before breakfast  polyethylene glycol, 17 g, oral, Daily  pravastatin, 10 mg, oral, Nightly  predniSONE, 5 mg, oral, Daily  sulfamethoxazole-trimethoprim, 1 tablet, oral, Daily  tacrolimus, 1.5 mg, oral, Daily  tacrolimus, 2 mg, oral, Daily       PRN medications: acetaminophen, dextrose 10 % in water (D10W), dextrose, docusate sodium, glucagon, ondansetron       Labs     All new labs reviewed:  some of the basic labs as follows -     Results from last 7 days   Lab Units 03/16/24  0620 03/15/24  0621 03/14/24  0728   WBC AUTO x10*3/uL 2.8* 2.2* 2.3*   HEMOGLOBIN g/dL 7.9* 8.3* 8.6*   HEMATOCRIT % 23.7* 25.8* 27.2*   PLATELETS AUTO x10*3/uL 100* 113* 117*   NEUTROS PCT AUTO % 54.7 47.1 48.1   LYMPHS PCT AUTO % 26.0 32.6 29.4   MONOS PCT AUTO % 13.9 13.1 17.7   EOS PCT AUTO % 3.2 4.5 3.5            Results from last 72 hours   Lab Units 03/16/24  0620 03/15/24  0621 03/14/24  0728   SODIUM mmol/L 140 138 139   POTASSIUM mmol/L 4.2 4.3 4.3   CHLORIDE mmol/L 106 103 104   CO2 mmol/L 27 27 26   BUN mg/dL 53* 52* 58*   CREATININE mg/dL 5.22* 5.40* 5.67*       Results from last 72 hours   Lab Units 03/16/24  0620 03/15/24  0621 03/14/24  0728   ALBUMIN g/dL 3.0* 3.3* 3.1*       Results from last 72 hours   Lab Units 03/16/24  0620 03/15/24  0621 03/14/24  0728   GLUCOSE mg/dL  "190* 264* 149*           No results found for: \"TR1\"  Lab Results   Component Value Date    URINECULTURE CANCELED 07/19/2023    BLOODCULT No growth at 4 days -  FINAL REPORT 10/19/2023    BLOODCULT No growth at 4 days -  FINAL REPORT 10/19/2023    BLOODCULT  09/19/2023     No Growth at 1 days~No Growth at 2 days~No Growth at 3 days~NO GROWTH at 4 days - FINAL REPORT    BLOODCULT  09/19/2023     No Growth at 1 days~No Growth at 2 days~No Growth at 3 days~NO GROWTH at 4 days - FINAL REPORT            Imaging   Vascular US Upper Extremity Hemodialysis Access Duplex Right              [place] [address]    Tel [phone] and Fax [phone]       Vascular Lab Report  VASC US UPPER EXTREMITY HEMODIALYSIS ACCESS DUPLEX RIGHT       Patient Name:     KELVIN LEVINE LIANE      Alma Delia Physician: [id number] Elizabeth Calles MD  Study Date:       3/15/2024           Ordering           [id number] DANIEL BIRD                                        Physician:         DIEGO  MRN/PID:          [id number]            Technologist:      Maya Rothman RVT  Accession#:       [id number]        Technologist 2:  Date of           1955 / 68      Encounter#:        [id number]  Birth/Age:        years  Gender:           M  Admission Status: Inpatient           Location           [facility]                                        Performed:       Diagnosis/ICD: End stage renal disease (ESRD)-N18.6  CPT Codes:     78941 Duplex Hemodialysis Access       **CRITICAL RESULT**  Critical Result: Occluded right AVF.  Notification called to Daniel Peña MD on 3/15/2024 at 3:54:25 PM by Maya Rothman RVT.     CONCLUSIONS:  Dialysis Access Evaluation: Right radiocephalic arteriovenous fistula appears occluded.  Thrombus visualized from prox-mid flow to outflow vein.  Known occluded arteriovenous graft visualized.     Imaging & Doppler Findings:     Dialysis Access Graft                  Right " Velocity  Arterial Anast  149 cm/s  Flow Prox       100 cm/s  Flow Prox/Mid   11 cm/s  Mid             41 cm/s  Mid/Flow Distal 19 cm/s  Flow Distal     0 cm/s  Outflow Vein    0 cm/s  Pre Anast       239 cm/s  Post Anast      38 cm/s                    PSV      Diameter Depth  Flow-prox AVF   100 cm/s 4.05 mm  3.79 mm  Mid-AVF         41 cm/s  5.34 mm  3.57 mm  Flow-distal AVF 0 cm/s   4.93 mm  2.76 mm    Volume Flow 13.37 ml/min       [id number] Elizabeth Calles MD  Electronically signed by [id number] Elizabeth Calles MD on 3/16/2024 at 11:41:45 AM       ** Final **     No results found for this or any previous visit from the past 1095 days.     Encounter Date: 03/11/24   ECG 12 Lead   Result Value    Ventricular Rate 65    Atrial Rate 65    WA Interval 174    QRS Duration 144    QT Interval 432    QTC Calculation(Bazett) 449    P Axis 69    R Axis -21    T Axis 32    QRS Count 11    Q Onset 214    P Onset 127    P Offset 160    T Offset 430    QTC Fredericia 443    Narrative    Normal sinus rhythm  Left bundle branch block  Abnormal ECG  When compared with ECG of 11-MAR-2024 04:24,  Left bundle branch block is now Present  Criteria for Septal infarct are no longer Present  Confirmed by Muna Murillo ([id number]) on 3/12/2024 10:24:42 PM        Assessment and Plan     Shortness of breath: Likely related to worsening volume overload in the setting of declining renal function.  Patient is slated for outpatient dialysis fistula placement later this month.  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  Recent kappa/lambda ratio was normal despite individuals being elevated.  Shortness of breath has improved with diuresis although creatinine improved back down to 5.2   -Continue diuretics as guided by transplant nephrology  -For now we will continue home immunosuppression regimen (tacrolimus, azathioprine, and prednisone).   -Continue home Sensipar and vitamin D3/calcitriol  -Patient with close outpatient  transplant nephrology follow-up    Cardiovascular: Blood pressure elevated  -Continue home carvedilol, nifedipine, and Imdur  -Continue hydralazine 100 mg 3 times daily.  -Patient may need additional agent such as clonidine  -Continue statin and aspirin    Pancytopenia: Iron is low mildly elevated ferritin and low percent sat.  Folate and B12 are normal  -Replete iron    Diabetes mellitus: A1c from 1/24 is 7.7.    -Continue home insulin    GI:  -Continue PPI  -Bowel care    Backache: No red flag symptoms  -Lidoderm patch.  Patient states this has worked well in the past when his back aches from being in hospital bed    Anticipate discharge    Daniel Peña MD     Of note the above was done with Dragon dictation system.  Note was proofread to minimize errors.

## 2024-03-16 NOTE — DISCHARGE SUMMARY
"Discharge Diagnosis  Hypervolemia, unspecified hypervolemia type    Issues Requiring Follow-Up  New medications at discharge:  Hydralazine 100 mg 3 times daily  Bumex 2 mg twice daily    Follow-ups:  PCP, referral placed  Nephrology, referral placed, follow-up in 2 weeks  You will be called by the scheduling service to set up this appointment. If you do not hear from them within 3 days, please call [phone] to schedule the appointment.    Discharge Meds     Your medication list        START taking these medications        Instructions Last Dose Given Next Dose Due   bumetanide 2 mg tablet  Commonly known as: Bumex      Take 1 tablet (2 mg) by mouth 3 times a day.       Lantus Solostar U-100 Insulin 100 unit/mL (3 mL) pen  Generic drug: insulin glargine  Replaces: Lantus U-100 Insulin 100 unit/mL injection      Inject 20 Units under the skin once daily in the morning. Take as directed per insulin instructions.              CHANGE how you take these medications        Instructions Last Dose Given Next Dose Due   hydrALAZINE 100 mg tablet  Commonly known as: Apresoline  What changed: how much to take      Take 0.5 tablets (50 mg) by mouth 3 times a day.       hydrALAZINE 100 mg tablet  Commonly known as: Apresoline  What changed: You were already taking a medication with the same name, and this prescription was added. Make sure you understand how and when to take each.      Take 1 tablet (100 mg) by mouth every 8 hours.       pen needle, diabetic 32 gauge x 5/32\" needle  Commonly known as: TechLITE Pen Needle  What changed: when to take this      1 each 4 times a day.       pravastatin 10 mg tablet  Commonly known as: Pravachol  What changed:   medication strength  how much to take  Another medication with the same name was removed. Continue taking this medication, and follow the directions you see here.      Take 1 tablet (10 mg) by mouth once daily at bedtime.       tacrolimus 0.5 mg capsule  Commonly known as: " Prograf  What changed: See the new instructions.      Take 1.5 mg by mouth once daily in the morning AND 2 mg once daily at bedtime.              CONTINUE taking these medications        Instructions Last Dose Given Next Dose Due   acetaminophen 325 mg tablet  Commonly known as: Tylenol      Take 3 tablets (975 mg) by mouth every 6 hours if needed (pain).       aspirin 81 mg chewable tablet      Chew 1 tablet (81 mg) once daily.       azaTHIOprine 50 mg tablet  Commonly known as: Imuran      Take 0.5 tablets (25 mg) by mouth once daily.       calcitriol 0.25 mcg capsule  Commonly known as: Rocaltrol      Take 1 capsule (0.25 mcg) by mouth once daily. Do not start before December 22, 2023.       carvedilol 12.5 mg tablet  Commonly known as: Coreg      TAKE THREE (3) TABLETS BY MOUTH TWICE DAILY       chlorhexidine 4 % external liquid  Commonly known as: Hibiclens      Apply topically once daily as needed for wound care.       cholecalciferol 25 MCG (1000 UT) tablet  Commonly known as: Vitamin D-3      Take 1 tablet (1,000 Units) by mouth once daily. Do not start before December 22, 2023.       cinacalcet 30 mg tablet  Commonly known as: Sensipar      Take 1 tablet (30 mg) by mouth once daily.       docusate sodium 100 mg capsule  Commonly known as: Colace           Easy Touch Alcohol Prep Pads pads, medicated  Generic drug: alcohol swabs           insulin lispro 100 unit/mL injection  Commonly known as: HumaLOG           isosorbide mononitrate ER 60 mg 24 hr tablet  Commonly known as: Imdur      Take 1 tablet (60 mg) by mouth once daily. Do not crush or chew. Do not start before January 22, 2024.       Lokelma 5 gram packet  Generic drug: sodium zirconium cyclosilicate      Take 5 g by mouth once daily.       multivitamin tablet           NIFEdipine ER 60 mg 24 hr tablet  Commonly known as: Adalat CC      Take 1 tablet (60 mg) by mouth 2 times a day. Do not crush, chew, or split.       ondansetron 4 mg  "tablet  Commonly known as: Zofran           pantoprazole 40 mg EC tablet  Commonly known as: ProtoNix      Take 1 tablet (40 mg) by mouth once daily in the morning. Take before meals. Do not crush, chew, or split. Do not start before January 22, 2024.       predniSONE 5 mg tablet  Commonly known as: Deltasone           sulfamethoxazole-trimethoprim 400-80 mg tablet  Commonly known as: Bactrim      Take 1 tablet by mouth 2 times a day.       Unilet Super Thin Lancets 30 gauge misc  Generic drug: lancets      USE TO TEST BLOOD SUGAR THREE TIMES A DAY       Unilet Super Thin Lancets 30 gauge misc  Generic drug: lancets      1 each 3 times a day.              STOP taking these medications      diclofenac sodium 1 % gel  Commonly known as: Voltaren        Lantus U-100 Insulin 100 unit/mL injection  Generic drug: insulin glargine  Replaced by: Lantus Solostar U-100 Insulin 100 unit/mL (3 mL) pen        torsemide 20 mg tablet  Commonly known as: Demadex                  Where to Get Your Medications        These medications were sent to [facility]  [address], [address] [address]      Hours: 8AM to 6PM Mon-Fri, 8AM to 4PM Sat, 9AM to 1PM Sun Phone: [phone]   bumetanide 2 mg tablet  chlorhexidine 4 % external liquid  hydrALAZINE 100 mg tablet  hydrALAZINE 100 mg tablet  Lantus Solostar U-100 Insulin 100 unit/mL (3 mL) pen  NIFEdipine ER 60 mg 24 hr tablet  pen needle, diabetic 32 gauge x 5/32\" needle  pravastatin 10 mg tablet  tacrolimus 0.5 mg capsule         Test Results Pending At Discharge  Pending Labs       No current pending labs.            Hospital Course  Manolo Bashir is a 68-year-old male with a past medical history of ESRD s/p renal transplant x 2 in 1992 in 2013 on immunosuppression, MGUS, pancytopenia, diabetes, hypertension, prostate cancer s/p radical prostatectomy, HCV fully treated with RNA not detected 10/18/2023 presenting to the emergency department with " shortness of breath.     In the ED, chest x-ray showed signs of fluid overload.  Notably BNP was elevated to 598.  He received 4 mg Bumex x 1 with good urine output.  Transplant nephrology was consulted and recommended Bumex drip which was started at 2 mg/h.  Symptoms of shortness of breath improved.  Right upper extremity ultrasound was completed to evaluate patency of pre-existing fistula, and showed occlusion of R AVF.  EBV and CMV found to be negative.  IV iron was started.    Home hydralazine was resumed on 3/14 after persistently elevated blood pressures in 180s systolic.    New medications at discharge:  Hydralazine 100 mg 3 times daily  Bumex 2 mg twice daily    Follow-ups:  PCP, referral placed  Nephrology, referral placed, follow-up in 2 weeks  You will be called by the scheduling service to set up this appointment. If you do not hear from them within 3 days, please call [phone] to schedule the appointment.          Pertinent Physical Exam At Time of Discharge  Physical Exam  General: pleasant, in NAD  HEENT: normocephalic, atraumatic  CV: heart regular rate and rhythm w/o murmurs  Resp: lungs clear to auscultation bilaterally  Abd: soft, nondistended, nontender  Ext: no peripheral edema  Neuro: grossly nonfocal, speech clear  Psych: appropriate affect      Outpatient Follow-Up  Future Appointments   Date Time Provider Department [place]   4/2/2024  1:30 PM Elías Penn APRN-CNP [id number] [facility]   4/4/2024  1:00 PM Humble South MD [id number] [facility]   4/16/2024 10:20 AM TXP KIDNEY PROVIDER [id number] [facility]   5/13/2024  9:45 AM Daxa Cote DPM [id number] [facility]   10/16/2024 10:30 AM Vinicius Araiza MD [id number] [employer]         Rocio Lemon MD

## 2024-03-18 DIAGNOSIS — L30.9 DERMATITIS: ICD-10-CM

## 2024-03-19 ENCOUNTER — LAB (OUTPATIENT)
Dept: LAB | Facility: LAB | Age: 69
End: 2024-03-19
Payer: COMMERCIAL

## 2024-03-19 DIAGNOSIS — N17.1 ACUTE RENAL FAILURE WITH ACUTE RENAL CORTICAL NECROSIS SUPERIMPOSED ON STAGE 4 CHRONIC KIDNEY DISEASE (MULTI): ICD-10-CM

## 2024-03-19 DIAGNOSIS — N18.4 ACUTE RENAL FAILURE WITH ACUTE RENAL CORTICAL NECROSIS SUPERIMPOSED ON STAGE 4 CHRONIC KIDNEY DISEASE (MULTI): ICD-10-CM

## 2024-03-19 DIAGNOSIS — D64.9 ANEMIA, UNSPECIFIED TYPE: ICD-10-CM

## 2024-03-19 DIAGNOSIS — N18.6 ESRD (END STAGE RENAL DISEASE) (MULTI): ICD-10-CM

## 2024-03-19 DIAGNOSIS — Z94.0 KIDNEY REPLACED BY TRANSPLANT (HHS-HCC): ICD-10-CM

## 2024-03-19 LAB
ALBUMIN SERPL BCP-MCNC: 3.5 G/DL (ref 3.4–5)
ALP SERPL-CCNC: 42 U/L (ref 33–136)
ALT SERPL W P-5'-P-CCNC: 24 U/L (ref 10–52)
ANION GAP SERPL CALC-SCNC: 11 MMOL/L (ref 10–20)
AST SERPL W P-5'-P-CCNC: 27 U/L (ref 9–39)
BASOPHILS # BLD AUTO: 0.01 X10*3/UL (ref 0–0.1)
BASOPHILS NFR BLD AUTO: 0.4 %
BILIRUB SERPL-MCNC: 0.3 MG/DL (ref 0–1.2)
BUN SERPL-MCNC: 66 MG/DL (ref 6–23)
CALCIUM SERPL-MCNC: 9.2 MG/DL (ref 8.6–10.6)
CHLORIDE SERPL-SCNC: 105 MMOL/L (ref 98–107)
CO2 SERPL-SCNC: 28 MMOL/L (ref 21–32)
CREAT SERPL-MCNC: 5.92 MG/DL (ref 0.5–1.3)
EGFRCR SERPLBLD CKD-EPI 2021: 10 ML/MIN/1.73M*2
EOSINOPHIL # BLD AUTO: 0.09 X10*3/UL (ref 0–0.7)
EOSINOPHIL NFR BLD AUTO: 3.2 %
ERYTHROCYTE [DISTWIDTH] IN BLOOD BY AUTOMATED COUNT: 15.2 % (ref 11.5–14.5)
FERRITIN SERPL-MCNC: 720 NG/ML (ref 20–300)
FOLATE SERPL-MCNC: 19.1 NG/ML
GLUCOSE SERPL-MCNC: 149 MG/DL (ref 74–99)
HCT VFR BLD AUTO: 26.7 % (ref 41–52)
HGB BLD-MCNC: 8.7 G/DL (ref 13.5–17.5)
IMM GRANULOCYTES # BLD AUTO: 0.03 X10*3/UL (ref 0–0.7)
IMM GRANULOCYTES NFR BLD AUTO: 1.1 % (ref 0–0.9)
IRON SATN MFR SERPL: 29 % (ref 25–45)
IRON SERPL-MCNC: 60 UG/DL (ref 35–150)
LYMPHOCYTES # BLD AUTO: 0.78 X10*3/UL (ref 1.2–4.8)
LYMPHOCYTES NFR BLD AUTO: 28 %
MAGNESIUM SERPL-MCNC: 1.86 MG/DL (ref 1.6–2.4)
MCH RBC QN AUTO: 29.4 PG (ref 26–34)
MCHC RBC AUTO-ENTMCNC: 32.6 G/DL (ref 32–36)
MCV RBC AUTO: 90 FL (ref 80–100)
MONOCYTES # BLD AUTO: 0.32 X10*3/UL (ref 0.1–1)
MONOCYTES NFR BLD AUTO: 11.5 %
NEUTROPHILS # BLD AUTO: 1.56 X10*3/UL (ref 1.2–7.7)
NEUTROPHILS NFR BLD AUTO: 55.8 %
NRBC BLD-RTO: 0 /100 WBCS (ref 0–0)
PHOSPHATE SERPL-MCNC: 2.9 MG/DL (ref 2.5–4.9)
PLATELET # BLD AUTO: 118 X10*3/UL (ref 150–450)
POTASSIUM SERPL-SCNC: 4.2 MMOL/L (ref 3.5–5.3)
PROT SERPL-MCNC: 5.9 G/DL (ref 6.4–8.2)
RBC # BLD AUTO: 2.96 X10*6/UL (ref 4.5–5.9)
SODIUM SERPL-SCNC: 140 MMOL/L (ref 136–145)
TACROLIMUS BLD-MCNC: 4.1 NG/ML
TIBC SERPL-MCNC: 206 UG/DL (ref 240–445)
UIBC SERPL-MCNC: 146 UG/DL (ref 110–370)
VIT B12 SERPL-MCNC: 365 PG/ML (ref 211–911)
WBC # BLD AUTO: 2.8 X10*3/UL (ref 4.4–11.3)

## 2024-03-19 PROCEDURE — 36415 COLL VENOUS BLD VENIPUNCTURE: CPT

## 2024-03-19 PROCEDURE — 83540 ASSAY OF IRON: CPT

## 2024-03-19 PROCEDURE — 83735 ASSAY OF MAGNESIUM: CPT

## 2024-03-19 PROCEDURE — 80197 ASSAY OF TACROLIMUS: CPT

## 2024-03-19 PROCEDURE — 82607 VITAMIN B-12: CPT

## 2024-03-19 PROCEDURE — 85025 COMPLETE CBC W/AUTO DIFF WBC: CPT

## 2024-03-19 PROCEDURE — 82746 ASSAY OF FOLIC ACID SERUM: CPT

## 2024-03-19 PROCEDURE — 84100 ASSAY OF PHOSPHORUS: CPT

## 2024-03-19 PROCEDURE — 80053 COMPREHEN METABOLIC PANEL: CPT

## 2024-03-19 PROCEDURE — 83550 IRON BINDING TEST: CPT

## 2024-03-19 PROCEDURE — 82728 ASSAY OF FERRITIN: CPT

## 2024-03-20 ENCOUNTER — ANESTHESIA EVENT (OUTPATIENT)
Dept: OPERATING ROOM | Facility: HOSPITAL | Age: 69
End: 2024-03-20
Payer: COMMERCIAL

## 2024-03-20 ENCOUNTER — OFFICE VISIT (OUTPATIENT)
Dept: PRIMARY CARE | Facility: CLINIC | Age: 69
End: 2024-03-20
Payer: COMMERCIAL

## 2024-03-20 VITALS
SYSTOLIC BLOOD PRESSURE: 194 MMHG | WEIGHT: 171 LBS | HEIGHT: 66 IN | DIASTOLIC BLOOD PRESSURE: 70 MMHG | BODY MASS INDEX: 27.48 KG/M2

## 2024-03-20 DIAGNOSIS — Z94.0 KIDNEY TRANSPLANTED (HHS-HCC): ICD-10-CM

## 2024-03-20 DIAGNOSIS — A63.0 WARTS, GENITAL: ICD-10-CM

## 2024-03-20 DIAGNOSIS — A63.0: Primary | ICD-10-CM

## 2024-03-20 DIAGNOSIS — B85.0 LICE INFESTED HAIR: ICD-10-CM

## 2024-03-20 LAB
CMV DNA SERPL NAA+PROBE-LOG IU: NORMAL {LOG_IU}/ML
LABORATORY COMMENT REPORT: NOT DETECTED

## 2024-03-20 PROCEDURE — 3008F BODY MASS INDEX DOCD: CPT | Performed by: NURSE PRACTITIONER

## 2024-03-20 PROCEDURE — 99214 OFFICE O/P EST MOD 30 MIN: CPT | Performed by: NURSE PRACTITIONER

## 2024-03-20 PROCEDURE — 3060F POS MICROALBUMINURIA REV: CPT | Performed by: NURSE PRACTITIONER

## 2024-03-20 PROCEDURE — 1126F AMNT PAIN NOTED NONE PRSNT: CPT | Performed by: NURSE PRACTITIONER

## 2024-03-20 PROCEDURE — 1036F TOBACCO NON-USER: CPT | Performed by: NURSE PRACTITIONER

## 2024-03-20 PROCEDURE — 3051F HG A1C>EQUAL 7.0%<8.0%: CPT | Performed by: NURSE PRACTITIONER

## 2024-03-20 PROCEDURE — 1160F RVW MEDS BY RX/DR IN RCRD: CPT | Performed by: NURSE PRACTITIONER

## 2024-03-20 PROCEDURE — 3077F SYST BP >= 140 MM HG: CPT | Performed by: NURSE PRACTITIONER

## 2024-03-20 PROCEDURE — 3078F DIAST BP <80 MM HG: CPT | Performed by: NURSE PRACTITIONER

## 2024-03-20 PROCEDURE — 1111F DSCHRG MED/CURRENT MED MERGE: CPT | Performed by: NURSE PRACTITIONER

## 2024-03-20 PROCEDURE — RXMED WILLOW AMBULATORY MEDICATION CHARGE

## 2024-03-20 PROCEDURE — 1159F MED LIST DOCD IN RCRD: CPT | Performed by: NURSE PRACTITIONER

## 2024-03-20 RX ORDER — TACROLIMUS 1 MG/G
OINTMENT TOPICAL 2 TIMES DAILY
Qty: 30 G | Refills: 0 | Status: SHIPPED | OUTPATIENT
Start: 2024-03-20 | End: 2025-03-20

## 2024-03-20 RX ORDER — PERMETHRIN 50 MG/G
CREAM TOPICAL ONCE
Qty: 60 G | Refills: 0 | Status: SHIPPED | OUTPATIENT
Start: 2024-03-20 | End: 2024-03-22

## 2024-03-20 ASSESSMENT — ENCOUNTER SYMPTOMS
CARDIOVASCULAR NEGATIVE: 1
NEUROLOGICAL NEGATIVE: 1
FREQUENCY: 1
APPETITE CHANGE: 1
BACK PAIN: 1
GASTROINTESTINAL NEGATIVE: 1

## 2024-03-20 ASSESSMENT — PAIN SCALES - GENERAL: PAINLEVEL: 0-NO PAIN

## 2024-03-20 NOTE — ANESTHESIA PREPROCEDURE EVALUATION
Patient: Manolo Bashir    Procedure Information       Date/Time: 03/21/24 1100    Procedure: A-V Graft Upper Extremity (Left)    Location: [facility] OR 15 / [facility]    Surgeons: Humble South MD            Relevant Problems   Cardiovascular  Echo in November 2023 showed EF of 65%, severe concentric LVH    (+) Aortic aneurysm (CMS/HCC)   (+) Hyperlipidemia   (+) Hypertension   (+) Peripheral vascular disease (CMS/HCC)      Endocrine   (+) Diabetes mellitus type 2 without retinopathy (CMS/HCC)   (+) Goiter   (+) Tertiary hyperparathyroidism (CMS/HCC)      GI   (+) GERD (gastroesophageal reflux disease)      /Renal  Hx kidney transplant x2 (last 2013), recently admit with fluid overload, diuesed with bumex and discharged on 3/16   Prostate cancer sp prostatectomy    (+) Acute renal failure superimposed on chronic kidney disease (CMS/HCC)   (+) Chronic hepatitis C (CMS/HCC)   (+) ESRD (end stage renal disease) (CMS/HCC)   (+) Focal and segmental proliferative glomerulonephritis   (+) Pyelonephritis   (+) Renal function impairment      Neuro/Psych   (+) Radiculitis      Pulmonary   (+) Pneumonia      GI/Hepatic   (+) Chronic hepatitis C (CMS/HCC)      Hematology  MGUS    (+) Pancytopenia (CMS/HCC)      Musculoskeletal   (+) Chronic low back pain      Infectious Disease   (+) Chronic hepatitis C (CMS/HCC)   (+) Disease due to severe acute respiratory syndrome coronavirus 2 (SARS-CoV-2)   (+) Methicillin resistant Staphylococcus aureus infection   (+) Pyelonephritis       Clinical information reviewed:                 TTE 11/16/23 ONCLUSIONS:   1. Left ventricular systolic function is normal with a 60-65% estimated ejection fraction.   2. Left ventricular cavity size is moderately dilated.   3. The left ventricular posterior wall thickness is moderately increased.   4. There is severe concentric left ventricular hypertrophy.   5. Mild to moderate aortic valve regurgitation.   6. The left atrium is moderately  dilated.   7. Compared with study from 6/21/2017, there is now moderate LV dysfunction and severe LV hypertrophy. The LV eyection fraction is unchanged. The aortic valve regurgitation has increased from trivial to mild to moderate.    NPO Detail:  No data recorded     PHYSICAL EXAM    Anesthesia Plan    History of general anesthesia?: yes  History of complications of general anesthesia?: no    ASA 3     general   (Chart review only)  intravenous induction   Anesthetic plan and risks discussed with patient.    Plan discussed with CAA.

## 2024-03-20 NOTE — PROGRESS NOTES
"Subjective   Patient ID: Manolo Bashir is a 68 y.o. male who presents for Follow-up.    HPI Mr. Bashir came in for a follow-up post hospital admission and medication refills. He has PMH of ESRD, Renal transplants (1992, 2017), on HD [id number], HTN, DM2, prostate CA s/p radical prostatectomy. Recent visits with Nephrology 2/5, Cardiology 2/13, Gen Surgery 2/21, Derm 2/23, and Endocrinology 2/28.     BP this visit 194/70, recheck was 192/78. He is taking his mediations and feels anxious for his A-V graft placement tomorrow. Denies chest pain, blurry vision, HA, SOB, and n/v. Checks his BG daily, today his BG was 177 before breakfast. His appetite is ok, sometimes he starts his meal then his appetite goes away since foods do not taste the same. He does drink 3 boosts per day. Sleep is ok but does experience urinary frequency at night about 3-4 x per night. Experiencing some back pain related to the bed during his hospital stay, but managed with acetaminophen and lidocaine patches.     Review of Systems   Constitutional:  Positive for appetite change.   HENT: Negative.     Cardiovascular: Negative.    Gastrointestinal: Negative.    Genitourinary:  Positive for frequency.   Musculoskeletal:  Positive for back pain.   Neurological: Negative.        Objective   BP (!) 194/70 Comment: manual right arm  Ht 1.676 m (5' 6\")   Wt 77.6 kg (171 lb)   BMI 27.60 kg/m²     Physical Exam  Constitutional:       Appearance: Normal appearance.   HENT:      Head: Normocephalic.   Cardiovascular:      Rate and Rhythm: Normal rate and regular rhythm.      Pulses: Normal pulses.      Heart sounds: Normal heart sounds.   Pulmonary:      Effort: Pulmonary effort is normal.      Breath sounds: Normal breath sounds.   Musculoskeletal:         General: Normal range of motion.   Skin:     General: Skin is warm and dry.   Neurological:      General: No focal deficit present.      Mental Status: He is alert and oriented to person, place, and " time.         Assessment/Plan   Diagnoses and all orders for this visit:  Kidney transplanted        -     OR tomorrow, 3/21, for placement of A-V graft in left arm   Warts, genital  -     tacrolimus (Protopic) 0.1 % ointment; Apply topically 2 times a day.  Lice infested hair  -     permethrin (Elimite) 5 % cream; Apply topically 1 time for 1 dose. Apply to skin from hairline to toes and wash off 8-10 hours later.  Other orders  -     Follow Up In Primary Care - Established; Future; follow up for mid May   I was present with the APRN student who participated in the documentation of this note. I have personally seen and re-examined the patient and performed the medical decision-making components (assessment and plan of care). I have reviewed the APRN student documentation and verified the findings in the note as written with additions or exceptions as stated in the body of this note.   Elma Hutton DNP, APRN-CNP

## 2024-03-20 NOTE — PROGRESS NOTES
"Subjective   Patient ID: Manolo Bashir is a 68 y.o. male who presents for Follow-up.    HPI Mr. Bashir came in for a follow-up post hospital admission and medication refills. He has PMH of ESRD, Renal transplants (1992, 2017), on HD [id number], HTN, DM2, prostate CA s/p radical prostatectomy. Recent visits with Nephrology 2/5, Cardiology 2/13, Gen Surgery 2/21, Derm 2/23, and Endocrinology 2/28.     BP this visit 194/70, recheck was 192/78. He is taking his mediations and feels anxious for his A-V graft placement tomorrow. Denies chest pain, blurry vision, HA, SOB, and n/v. Checks his BG daily, today his BG was 177 before breakfast. His appetite is ok, sometimes he starts his meal then his appetite goes away since foods do not taste the same. He does drink 3 boosts per day. Sleep is ok but does experience urinary frequency at night about 3-4 x per night. Experiencing some back pain related to the bed during his hospital stay, but managed with acetaminophen and lidocaine patches.     Review of Systems   Constitutional:  Positive for appetite change.   HENT: Negative.     Cardiovascular: Negative.    Gastrointestinal: Negative.    Genitourinary:  Positive for frequency.   Musculoskeletal:  Positive for back pain.   Neurological: Negative.        Objective   BP (!) 194/70 Comment: manual right arm  Ht 1.676 m (5' 6\")   Wt 77.6 kg (171 lb)   BMI 27.60 kg/m²     Physical Exam  Constitutional:       Appearance: Normal appearance.   HENT:      Head: Normocephalic.   Cardiovascular:      Rate and Rhythm: Normal rate and regular rhythm.      Pulses: Normal pulses.      Heart sounds: Normal heart sounds.   Pulmonary:      Effort: Pulmonary effort is normal.      Breath sounds: Normal breath sounds.   Musculoskeletal:         General: Normal range of motion.   Skin:     General: Skin is warm and dry.   Neurological:      General: No focal deficit present.      Mental Status: He is alert and oriented to person, place, and " time.         Assessment/Plan   Diagnoses and all orders for this visit:  Kidney transplanted        -     OR tomorrow, 3/21, for placement of A-V graft in left arm   Warts, genital  -     tacrolimus (Protopic) 0.1 % ointment; Apply topically 2 times a day.  Lice infested hair  -     permethrin (Elimite) 5 % cream; Apply topically 1 time for 1 dose. Apply to skin from hairline to toes and wash off 8-10 hours later.  Other orders  -     Follow Up In Primary Care - Established; Future; follow up for mid May

## 2024-03-21 ENCOUNTER — HOSPITAL ENCOUNTER (OUTPATIENT)
Facility: HOSPITAL | Age: 69
Setting detail: OUTPATIENT SURGERY
Discharge: HOME | End: 2024-03-21
Attending: TRANSPLANT SURGERY | Admitting: TRANSPLANT SURGERY
Payer: COMMERCIAL

## 2024-03-21 ENCOUNTER — PHARMACY VISIT (OUTPATIENT)
Dept: PHARMACY | Facility: CLINIC | Age: 69
End: 2024-03-21
Payer: MEDICAID

## 2024-03-21 ENCOUNTER — ANESTHESIA (OUTPATIENT)
Dept: OPERATING ROOM | Facility: HOSPITAL | Age: 69
End: 2024-03-21
Payer: COMMERCIAL

## 2024-03-21 VITALS
BODY MASS INDEX: 25.16 KG/M2 | WEIGHT: 166.01 LBS | TEMPERATURE: 96.8 F | DIASTOLIC BLOOD PRESSURE: 50 MMHG | SYSTOLIC BLOOD PRESSURE: 129 MMHG | RESPIRATION RATE: 13 BRPM | HEART RATE: 67 BPM | OXYGEN SATURATION: 100 % | HEIGHT: 68 IN

## 2024-03-21 DIAGNOSIS — N18.6 ESRD (END STAGE RENAL DISEASE) (MULTI): ICD-10-CM

## 2024-03-21 LAB
GLUCOSE BLD MANUAL STRIP-MCNC: 134 MG/DL (ref 74–99)
GLUCOSE BLD MANUAL STRIP-MCNC: 158 MG/DL (ref 74–99)

## 2024-03-21 PROCEDURE — 3700000001 HC GENERAL ANESTHESIA TIME - INITIAL BASE CHARGE: Performed by: TRANSPLANT SURGERY

## 2024-03-21 PROCEDURE — 3600000009 HC OR TIME - EACH INCREMENTAL 1 MINUTE - PROCEDURE LEVEL FOUR: Performed by: TRANSPLANT SURGERY

## 2024-03-21 PROCEDURE — A36821 PR ANASTOMOSIS,AV,ANY SITE: Performed by: ANESTHESIOLOGY

## 2024-03-21 PROCEDURE — C1768 GRAFT, VASCULAR: HCPCS | Performed by: TRANSPLANT SURGERY

## 2024-03-21 PROCEDURE — 2500000004 HC RX 250 GENERAL PHARMACY W/ HCPCS (ALT 636 FOR OP/ED): Mod: SE | Performed by: ANESTHESIOLOGIST ASSISTANT

## 2024-03-21 PROCEDURE — 2500000005 HC RX 250 GENERAL PHARMACY W/O HCPCS: Mod: SE | Performed by: ANESTHESIOLOGY

## 2024-03-21 PROCEDURE — 36830 ARTERY-VEIN NONAUTOGRAFT: CPT | Performed by: TRANSPLANT SURGERY

## 2024-03-21 PROCEDURE — A4217 STERILE WATER/SALINE, 500 ML: HCPCS | Mod: SE | Performed by: TRANSPLANT SURGERY

## 2024-03-21 PROCEDURE — 2500000005 HC RX 250 GENERAL PHARMACY W/O HCPCS: Mod: SE | Performed by: TRANSPLANT SURGERY

## 2024-03-21 PROCEDURE — 2780000003 HC OR 278 NO HCPCS: Performed by: TRANSPLANT SURGERY

## 2024-03-21 PROCEDURE — 7100000009 HC PHASE TWO TIME - INITIAL BASE CHARGE: Performed by: TRANSPLANT SURGERY

## 2024-03-21 PROCEDURE — 2500000005 HC RX 250 GENERAL PHARMACY W/O HCPCS: Mod: SE | Performed by: ANESTHESIOLOGIST ASSISTANT

## 2024-03-21 PROCEDURE — RXMED WILLOW AMBULATORY MEDICATION CHARGE

## 2024-03-21 PROCEDURE — 99222 1ST HOSP IP/OBS MODERATE 55: CPT | Performed by: TRANSPLANT SURGERY

## 2024-03-21 PROCEDURE — 2500000004 HC RX 250 GENERAL PHARMACY W/ HCPCS (ALT 636 FOR OP/ED): Mod: SE | Performed by: TRANSPLANT SURGERY

## 2024-03-21 PROCEDURE — 7100000002 HC RECOVERY ROOM TIME - EACH INCREMENTAL 1 MINUTE: Performed by: TRANSPLANT SURGERY

## 2024-03-21 PROCEDURE — A36821 PR ANASTOMOSIS,AV,ANY SITE: Performed by: ANESTHESIOLOGIST ASSISTANT

## 2024-03-21 PROCEDURE — 2720000007 HC OR 272 NO HCPCS: Performed by: TRANSPLANT SURGERY

## 2024-03-21 PROCEDURE — 7100000010 HC PHASE TWO TIME - EACH INCREMENTAL 1 MINUTE: Performed by: TRANSPLANT SURGERY

## 2024-03-21 PROCEDURE — 3700000002 HC GENERAL ANESTHESIA TIME - EACH INCREMENTAL 1 MINUTE: Performed by: TRANSPLANT SURGERY

## 2024-03-21 PROCEDURE — 2500000001 HC RX 250 WO HCPCS SELF ADMINISTERED DRUGS (ALT 637 FOR MEDICARE OP): Mod: SE | Performed by: ANESTHESIOLOGY

## 2024-03-21 PROCEDURE — 3600000004 HC OR TIME - INITIAL BASE CHARGE - PROCEDURE LEVEL FOUR: Performed by: TRANSPLANT SURGERY

## 2024-03-21 PROCEDURE — 7100000001 HC RECOVERY ROOM TIME - INITIAL BASE CHARGE: Performed by: TRANSPLANT SURGERY

## 2024-03-21 PROCEDURE — 82947 ASSAY GLUCOSE BLOOD QUANT: CPT

## 2024-03-21 DEVICE — PROPATEN VASCULAR GRAFT SW 4-7MMX45CM TAPERED HEPARIN
Type: IMPLANTABLE DEVICE | Site: ARM | Status: FUNCTIONAL
Brand: GORE PROPATEN VASCULAR GRAFT

## 2024-03-21 RX ORDER — HYDROMORPHONE HYDROCHLORIDE 1 MG/ML
0.2 INJECTION, SOLUTION INTRAMUSCULAR; INTRAVENOUS; SUBCUTANEOUS EVERY 5 MIN PRN
Status: DISCONTINUED | OUTPATIENT
Start: 2024-03-21 | End: 2024-03-21 | Stop reason: HOSPADM

## 2024-03-21 RX ORDER — FENTANYL CITRATE 50 UG/ML
INJECTION, SOLUTION INTRAMUSCULAR; INTRAVENOUS AS NEEDED
Status: DISCONTINUED | OUTPATIENT
Start: 2024-03-21 | End: 2024-03-21

## 2024-03-21 RX ORDER — POLYETHYLENE GLYCOL 3350 17 G/17G
17 POWDER, FOR SOLUTION ORAL DAILY PRN
Qty: 3 PACKET | Refills: 0 | Status: SHIPPED | OUTPATIENT
Start: 2024-03-21 | End: 2024-03-24

## 2024-03-21 RX ORDER — HYDROMORPHONE HYDROCHLORIDE 1 MG/ML
0.5 INJECTION, SOLUTION INTRAMUSCULAR; INTRAVENOUS; SUBCUTANEOUS EVERY 5 MIN PRN
Status: DISCONTINUED | OUTPATIENT
Start: 2024-03-21 | End: 2024-03-21 | Stop reason: HOSPADM

## 2024-03-21 RX ORDER — PHENYLEPHRINE HCL IN 0.9% NACL 0.4MG/10ML
SYRINGE (ML) INTRAVENOUS AS NEEDED
Status: DISCONTINUED | OUTPATIENT
Start: 2024-03-21 | End: 2024-03-21

## 2024-03-21 RX ORDER — ONDANSETRON HYDROCHLORIDE 2 MG/ML
4 INJECTION, SOLUTION INTRAVENOUS ONCE AS NEEDED
Status: DISCONTINUED | OUTPATIENT
Start: 2024-03-21 | End: 2024-03-21 | Stop reason: HOSPADM

## 2024-03-21 RX ORDER — LIDOCAINE HCL/PF 100 MG/5ML
SYRINGE (ML) INTRAVENOUS AS NEEDED
Status: DISCONTINUED | OUTPATIENT
Start: 2024-03-21 | End: 2024-03-21

## 2024-03-21 RX ORDER — ONDANSETRON HYDROCHLORIDE 2 MG/ML
INJECTION, SOLUTION INTRAVENOUS AS NEEDED
Status: DISCONTINUED | OUTPATIENT
Start: 2024-03-21 | End: 2024-03-21

## 2024-03-21 RX ORDER — HEPARIN SODIUM 1000 [USP'U]/ML
INJECTION, SOLUTION INTRAVENOUS; SUBCUTANEOUS AS NEEDED
Status: DISCONTINUED | OUTPATIENT
Start: 2024-03-21 | End: 2024-03-21

## 2024-03-21 RX ORDER — PROTAMINE SULFATE 10 MG/ML
INJECTION, SOLUTION INTRAVENOUS AS NEEDED
Status: DISCONTINUED | OUTPATIENT
Start: 2024-03-21 | End: 2024-03-21

## 2024-03-21 RX ORDER — VANCOMYCIN HYDROCHLORIDE 1 G/20ML
INJECTION, POWDER, LYOPHILIZED, FOR SOLUTION INTRAVENOUS AS NEEDED
Status: DISCONTINUED | OUTPATIENT
Start: 2024-03-21 | End: 2024-03-21

## 2024-03-21 RX ORDER — POLYMYXIN B 500000 [USP'U]/1
INJECTION, POWDER, LYOPHILIZED, FOR SOLUTION INTRAMUSCULAR; INTRATHECAL; INTRAVENOUS; OPHTHALMIC AS NEEDED
Status: DISCONTINUED | OUTPATIENT
Start: 2024-03-21 | End: 2024-03-21 | Stop reason: HOSPADM

## 2024-03-21 RX ORDER — DROPERIDOL 2.5 MG/ML
0.62 INJECTION, SOLUTION INTRAMUSCULAR; INTRAVENOUS ONCE AS NEEDED
Status: DISCONTINUED | OUTPATIENT
Start: 2024-03-21 | End: 2024-03-21 | Stop reason: HOSPADM

## 2024-03-21 RX ORDER — MEPERIDINE HYDROCHLORIDE 25 MG/ML
12.5 INJECTION INTRAMUSCULAR; INTRAVENOUS; SUBCUTANEOUS EVERY 10 MIN PRN
Status: DISCONTINUED | OUTPATIENT
Start: 2024-03-21 | End: 2024-03-21 | Stop reason: HOSPADM

## 2024-03-21 RX ORDER — OXYCODONE HYDROCHLORIDE 5 MG/1
5 TABLET ORAL EVERY 4 HOURS PRN
Status: DISCONTINUED | OUTPATIENT
Start: 2024-03-21 | End: 2024-03-21 | Stop reason: HOSPADM

## 2024-03-21 RX ORDER — MIDAZOLAM HYDROCHLORIDE 1 MG/ML
INJECTION INTRAMUSCULAR; INTRAVENOUS AS NEEDED
Status: DISCONTINUED | OUTPATIENT
Start: 2024-03-21 | End: 2024-03-21

## 2024-03-21 RX ORDER — PROPOFOL 10 MG/ML
INJECTION, EMULSION INTRAVENOUS AS NEEDED
Status: DISCONTINUED | OUTPATIENT
Start: 2024-03-21 | End: 2024-03-21

## 2024-03-21 RX ORDER — BUPIVACAINE HYDROCHLORIDE 2.5 MG/ML
INJECTION, SOLUTION EPIDURAL; INFILTRATION; INTRACAUDAL AS NEEDED
Status: DISCONTINUED | OUTPATIENT
Start: 2024-03-21 | End: 2024-03-21 | Stop reason: HOSPADM

## 2024-03-21 RX ORDER — NORETHINDRONE AND ETHINYL ESTRADIOL 0.5-0.035
KIT ORAL AS NEEDED
Status: DISCONTINUED | OUTPATIENT
Start: 2024-03-21 | End: 2024-03-21

## 2024-03-21 RX ORDER — HEPARIN SODIUM (PORCINE) LOCK FLUSH IV SOLN 100 UNIT/ML 100 UNIT/ML
SOLUTION INTRAVENOUS AS NEEDED
Status: DISCONTINUED | OUTPATIENT
Start: 2024-03-21 | End: 2024-03-21 | Stop reason: HOSPADM

## 2024-03-21 RX ORDER — ROCURONIUM BROMIDE 10 MG/ML
INJECTION, SOLUTION INTRAVENOUS AS NEEDED
Status: DISCONTINUED | OUTPATIENT
Start: 2024-03-21 | End: 2024-03-21

## 2024-03-21 RX ORDER — SODIUM CHLORIDE, SODIUM LACTATE, POTASSIUM CHLORIDE, CALCIUM CHLORIDE 600; 310; 30; 20 MG/100ML; MG/100ML; MG/100ML; MG/100ML
100 INJECTION, SOLUTION INTRAVENOUS CONTINUOUS
Status: DISCONTINUED | OUTPATIENT
Start: 2024-03-21 | End: 2024-03-21 | Stop reason: HOSPADM

## 2024-03-21 RX ORDER — OXYCODONE HYDROCHLORIDE 5 MG/1
5 TABLET ORAL EVERY 6 HOURS PRN
Qty: 10 TABLET | Refills: 0 | Status: SHIPPED | OUTPATIENT
Start: 2024-03-21 | End: 2024-03-26

## 2024-03-21 RX ORDER — SODIUM CHLORIDE 0.9 G/100ML
IRRIGANT IRRIGATION AS NEEDED
Status: DISCONTINUED | OUTPATIENT
Start: 2024-03-21 | End: 2024-03-21 | Stop reason: HOSPADM

## 2024-03-21 RX ADMIN — Medication 80 MCG: at 12:14

## 2024-03-21 RX ADMIN — Medication 160 MCG: at 10:54

## 2024-03-21 RX ADMIN — FENTANYL CITRATE 25 MCG: 50 INJECTION, SOLUTION INTRAMUSCULAR; INTRAVENOUS at 11:42

## 2024-03-21 RX ADMIN — OXYCODONE HYDROCHLORIDE 5 MG: 5 TABLET ORAL at 13:45

## 2024-03-21 RX ADMIN — Medication 120 MCG: at 10:48

## 2024-03-21 RX ADMIN — Medication 200 MCG: at 12:19

## 2024-03-21 RX ADMIN — SUGAMMADEX 100 MG: 100 INJECTION, SOLUTION INTRAVENOUS at 12:36

## 2024-03-21 RX ADMIN — Medication 160 MCG: at 12:04

## 2024-03-21 RX ADMIN — HEPARIN SODIUM 3000 UNITS: 1000 INJECTION INTRAVENOUS; SUBCUTANEOUS at 11:05

## 2024-03-21 RX ADMIN — Medication 120 MCG: at 12:01

## 2024-03-21 RX ADMIN — ROCURONIUM BROMIDE 50 MG: 10 INJECTION INTRAVENOUS at 10:27

## 2024-03-21 RX ADMIN — VANCOMYCIN HYDROCHLORIDE 1 G: 1 INJECTION, POWDER, LYOPHILIZED, FOR SOLUTION INTRAVENOUS at 10:35

## 2024-03-21 RX ADMIN — ONDANSETRON 4 MG: 2 INJECTION INTRAMUSCULAR; INTRAVENOUS at 12:05

## 2024-03-21 RX ADMIN — Medication 160 MCG: at 12:08

## 2024-03-21 RX ADMIN — PROPOFOL 50 MG: 10 INJECTION, EMULSION INTRAVENOUS at 11:33

## 2024-03-21 RX ADMIN — Medication 8 L/MIN: at 12:45

## 2024-03-21 RX ADMIN — LIDOCAINE HYDROCHLORIDE 100 MG: 20 INJECTION INTRAVENOUS at 10:27

## 2024-03-21 RX ADMIN — FENTANYL CITRATE 25 MCG: 50 INJECTION, SOLUTION INTRAMUSCULAR; INTRAVENOUS at 11:58

## 2024-03-21 RX ADMIN — PROPOFOL 100 MG: 10 INJECTION, EMULSION INTRAVENOUS at 10:27

## 2024-03-21 RX ADMIN — PROTAMINE SULFATE 15 MG: 10 INJECTION, SOLUTION INTRAVENOUS at 12:03

## 2024-03-21 RX ADMIN — MIDAZOLAM HYDROCHLORIDE 1 MG: 1 INJECTION, SOLUTION INTRAMUSCULAR; INTRAVENOUS at 10:27

## 2024-03-21 RX ADMIN — SUGAMMADEX 50 MG: 100 INJECTION, SOLUTION INTRAVENOUS at 12:30

## 2024-03-21 RX ADMIN — EPHEDRINE SULFATE 10 MG: 50 INJECTION, SOLUTION INTRAVENOUS at 12:17

## 2024-03-21 RX ADMIN — SUGAMMADEX 50 MG: 100 INJECTION, SOLUTION INTRAVENOUS at 12:24

## 2024-03-21 RX ADMIN — SODIUM CHLORIDE: 9 INJECTION, SOLUTION INTRAVENOUS at 10:20

## 2024-03-21 ASSESSMENT — PAIN SCALES - GENERAL
PAINLEVEL_OUTOF10: 0 - NO PAIN
PAINLEVEL_OUTOF10: 3
PAINLEVEL_OUTOF10: 0 - NO PAIN
PAINLEVEL_OUTOF10: 0 - NO PAIN
PAINLEVEL_OUTOF10: 2
PAINLEVEL_OUTOF10: 3
PAINLEVEL_OUTOF10: 2
PAINLEVEL_OUTOF10: 0 - NO PAIN
PAINLEVEL_OUTOF10: 0 - NO PAIN

## 2024-03-21 ASSESSMENT — PAIN SCALES - PAIN ASSESSMENT IN ADVANCED DEMENTIA (PAINAD)
BREATHING: NORMAL
CONSOLABILITY: NO NEED TO CONSOLE
FACIALEXPRESSION: SMILING OR INEXPRESSIVE
BODYLANGUAGE: RELAXED
TOTALSCORE: 0

## 2024-03-21 ASSESSMENT — PAIN - FUNCTIONAL ASSESSMENT
PAIN_FUNCTIONAL_ASSESSMENT: 0-10

## 2024-03-21 NOTE — ANESTHESIA POSTPROCEDURE EVALUATION
Patient: Manolo Bashir    Procedure Summary       Date: 03/21/24 Room / Location: [facility] OR 15 / [facility] OR    Anesthesia Start: 1010 Anesthesia Stop: 1250    Procedure: A-V Graft Upper Extremity (Left: Arm Upper) Diagnosis:       ESRD (end stage renal disease) (CMS/[facility])      (ESRD (end stage renal disease) (CMS/[facility]) [N18.6])    Surgeons: Humble South MD Responsible Provider: Chapo Aldrich MD    Anesthesia Type: general ASA Status: 3            Anesthesia Type: general    Vitals Value Taken Time   /56 03/21/24 1245   Temp 36.5 °C (97.7 °F) 03/21/24 1245   Pulse 60 03/21/24 1249   Resp 13 03/21/24 1249   SpO2 98 % 03/21/24 1249   Vitals shown include unvalidated device data.    Anesthesia Post Evaluation    Patient location during evaluation: PACU  Patient participation: complete - patient participated  Level of consciousness: awake  Pain scale: see RN record.  Pain management: adequate  Airway patency: patent  Cardiovascular status: acceptable  Respiratory status: acceptable and face mask  Hydration status: acceptable  Postoperative Nausea and Vomiting: none        No notable events documented.

## 2024-03-21 NOTE — ANESTHESIA PROCEDURE NOTES
Airway  Date/Time: 3/21/2024 10:29 AM  Urgency: elective    Airway not difficult    Staffing  Performed: PARVEEN   Authorized by: Chapo Aldrich MD    Performed by: PARVEEN Segovia  Patient location during procedure: OR    Indications and Patient Condition  Indications for airway management: anesthesia and airway protection  Spontaneous ventilation: present  Sedation level: deep  Preoxygenated: yes  Patient position: sniffing  MILS not maintained throughout  Mask difficulty assessment: 1 - vent by mask    Final Airway Details  Final airway type: endotracheal airway      Successful airway: ETT  Cuffed: yes   Successful intubation technique: direct laryngoscopy  Blade: Shamika  Blade size: #4  ETT size (mm): 7.5  Cormack-Lehane Classification: grade I - full view of glottis  Placement verified by: chest auscultation and capnometry   Measured from: lips  ETT to lips (cm): 22  Number of attempts at approach: 1    Additional Comments  Secured with tube tie

## 2024-03-21 NOTE — H&P
Transplant Surgery History and Physical    Subjective   HPI:  Manolo Bashir is a 68 y.o. male with history of ESRD s/p renal transplant x2 (1992. 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    Of note, he was recently hospitalized 3/11-3/16 for SOB/hypervolemia improved with diuresis. Feels well today.     Vein mapping showed 6 mm proximal left basilic vein and 6 mm brachial artery. Duplicate anatomy noted.       PMH:  Past Medical History:   Diagnosis Date    Anemia     Arthritis     Cataract     Chronic kidney disease, stage 3 unspecified (CMS/HCC) 09/26/2018    Stage 3 chronic kidney disease    CKD (chronic kidney disease)     stage V    COVID-19 06/18/2020    COVID-19 virus infection    Diabetes (CMS/HCC)     ESRD (end stage renal disease) (CMS/HCC)     Focal and segmental proliferative glomerulonephritis 12/23/2023    HTN (hypertension)     Hyperlipidemia     Other long term (current) drug therapy 07/20/2021    High risk medication use    Personal history of other diseases of the circulatory system     Personal history of cardiac murmur    Personal history of other infectious and parasitic diseases 08/17/2015    History of hepatitis    Polyp, colonic 08/17/2023    Primary osteoarthritis of both ankles 08/17/2023    Prostate cancer (CMS/HCC)     Tubular adenoma of colon 08/17/2023    Unspecified kidney failure 08/17/2016    Renal failure     PSH:  Past Surgical History:   Procedure Laterality Date    ILEOSTOMY  04/25/2017    Ileostomy    ILEOSTOMY CLOSURE  08/17/2015    Ileostomy  Closure    OTHER SURGICAL HISTORY  04/21/2017    Right Hemicolectomy    OTHER SURGICAL HISTORY  08/17/2015    Arteriovenous Surgery Creation Of A-V Fistula    OTHER SURGICAL HISTORY  08/17/2015    Sigmoidoscopy (Fiberoptic, Therapeutic )    PROSTATECTOMY  10/11/2013    Prostatectomy Radical    TRANSPLANT, KIDNEY, OPEN  1992    TRANSPLANT, KIDNEY, OPEN  2013    US GUIDED PERCUTANEOUS BIOPSY RENAL LEFT Left 11/20/2023    US GUIDED PERCUTANEOUS BIOPSY RENAL LEFT 11/20/2023 Lou Rodgers MD [facility]    US GUIDED PERCUTANEOUS PERITONEAL OR RETROPERITONEAL FLUID COLLECTION DRAINAGE  10/20/2022    US GUIDED PERCUTANEOUS PERITONEAL OR RETROPERITONEAL FLUID COLLECTION DRAINAGE 10/20/2022 [facility] CLINICAL LEGACY     Soc Hx:  Social History     Socioeconomic History    Marital status: Single     Spouse name: Not on file    Number of children: Not on file    Years of education: Not on file    Highest education level: Not on file   Occupational History    Not on file   Tobacco Use    Smoking status: Never     Passive exposure: Past    Smokeless tobacco: Never   Vaping Use    Vaping Use: Never used   Substance and Sexual Activity    Alcohol use: Yes    Drug use: Yes     Types: Oxycodone    Sexual activity: Defer   Other Topics Concern    Not on file   Social History Narrative    Not on file     Social Determinants of Health     Financial Resource Strain: Low Risk  (3/12/2024)    Overall Financial Resource Strain (CARDIA)     Difficulty of Paying Living Expenses: Not hard at all   Food Insecurity: No Food Insecurity (10/3/2023)    Hunger Vital Sign     Worried About Running Out of Food in the Last Year: Never true     Ran Out of Food in the Last Year: Never true   Transportation Needs: No Transportation Needs (3/12/2024)    PRAPARE - Transportation     Lack of Transportation (Medical): No     Lack of Transportation (Non-Medical): No   Physical Activity: Unknown (10/3/2023)    Exercise Vital Sign     Days of Exercise per Week: 2 days      Minutes of Exercise per Session: Patient declined   Recent Concern: Physical Activity - Insufficiently Active (10/3/2023)    Exercise Vital Sign     Days of Exercise per Week: 2 days     Minutes of Exercise per Session: 30 min   Stress: No Stress Concern Present (10/3/2023)    [employer] [place] [employer] - Occupational Stress Questionnaire     Feeling of Stress : Not at all   Social Connections: Unknown (10/3/2023)    Social Connection and Isolation Panel [NHANES]     Frequency of Communication with Friends and Family: More than three times a week     Frequency of Social Gatherings with Friends and Family: Twice a week     Attends Synagogue Services: Patient declined     Active Member of Clubs or Organizations: Patient declined     Attends Club or Organization Meetings: Patient declined     Marital Status: Never    Intimate Partner Violence: Not At Risk (10/3/2023)    Humiliation, Afraid, Rape, and Kick questionnaire     Fear of Current or Ex-Partner: No     Emotionally Abused: No     Physically Abused: No     Sexually Abused: No   Housing Stability: Low Risk  (3/12/2024)    Housing Stability Vital Sign     Unable to Pay for Housing in the Last Year: No     Number of Places Lived in the Last Year: 1     Unstable Housing in the Last Year: No     Fam Hx:  Family History   Problem Relation Name Age of Onset    Bone cancer Mother      Other (corona's sarcome of the bone marrow) Mother      Prostate cancer Father      Diabetes Other Family Hist     Hypertension Other Family Hist       Allergies:  No Known Allergies  Current Medications:  No current facility-administered medications on file prior to encounter.     Current Outpatient Medications on File Prior to Encounter   Medication Sig Dispense Refill    acetaminophen (Tylenol) 325 mg tablet Take 3 tablets (975 mg) by mouth every 6 hours if needed (pain). 30 tablet 11    aspirin 81 mg chewable tablet Chew 1 tablet (81 mg) once daily. 30 tablet 11     azaTHIOprine (Imuran) 50 mg tablet Take 0.5 tablets (25 mg) by mouth once daily. 30 tablet 3    calcitriol (Rocaltrol) 0.25 mcg capsule Take 1 capsule (0.25 mcg) by mouth once daily. Do not start before December 22, 2023. 30 capsule 2    carvedilol (Coreg) 12.5 mg tablet TAKE THREE (3) TABLETS BY MOUTH TWICE DAILY 180 tablet 10    cholecalciferol (Vitamin D-3) 25 MCG (1000 UT) tablet Take 1 tablet (1,000 Units) by mouth once daily. Do not start before December 22, 2023. 30 tablet 2    cinacalcet (Sensipar) 30 mg tablet Take 1 tablet (30 mg) by mouth once daily. 30 tablet 11    docusate sodium (Colace) 100 mg capsule Take 1 capsule (100 mg) by mouth once daily as needed.      Easy Touch Alcohol Prep Pads pads, medicated       insulin lispro (HumaLOG) 100 unit/mL injection Inject under the skin 3 times a day with meals. 100-199 2 units 200-299 4 units 300-399 6 units      isosorbide mononitrate ER (Imdur) 60 mg 24 hr tablet Take 1 tablet (60 mg) by mouth once daily. Do not crush or chew. Do not start before January 22, 2024. 30 tablet 1    lancets (Unilet Super Thin Lancets) 30 gauge misc USE TO TEST BLOOD SUGAR THREE TIMES A  each 0    multivitamin tablet Take 1 tablet by mouth once daily.      ondansetron (Zofran) 4 mg tablet Take 1 tablet (4 mg) by mouth every 8 hours if needed for nausea or vomiting.      pantoprazole (ProtoNix) 40 mg EC tablet Take 1 tablet (40 mg) by mouth once daily in the morning. Take before meals. Do not crush, chew, or split. Do not start before January 22, 2024. 30 tablet 1    sodium zirconium cyclosilicate (Lokelma) 5 gram packet Take 5 g by mouth once daily. 30 packet 11    sulfamethoxazole-trimethoprim (Bactrim) 400-80 mg tablet Take 1 tablet by mouth 2 times a day. 60 tablet 5    Unilet Super Thin Lancets 30 gauge misc 1 each 3 times a day. 100 each 3         Objective   Vitals:  There were no vitals taken for this visit.    Physical Exam:  GEN: No acute distress. Alert, awake  and conversive.  HEENT: Sclera anicteric. Moist mucous membranes.  RESP: Breathing non-labored, equal chest rise. On RA.  CV: Regular rate, normotensive  GI: Abdomen soft, nondistended, nontender.   : Voiding spontaneously.  MSK: No gross deformities. Moves all extremities spontaneously.  NEURO: Alert and oriented x3. No focal deficits.  PSYCH: Appropriate mood and affect.  SKIN: No rashes or lesions.    Labs within past 24h:  No results found for this or any previous visit (from the past 24 hour(s)).    Imaging within past 24h:  No results found.    I have reviewed the imaging above as it pertains to the patient's surgical concerns and agree with the radiologist's interpretation.     ASSESSMENT  Manolo Bashir is a 68 y.o. male with h/o ESRD s/p renal transplant x2 (1992. 2013), impaired allograft function, occluded BUE forearm AVF presenting for LUE AVG.    PLAN:  - OR today  - Plan for same day discharge if patient tolerates OR well  - Resume home meds    Patient's exam, labs, and findings discussed and seen with Dr. South, who agrees with plan as above.    Margarita Mckeon MD  PGY-3 General Surgery  Transplant Surgery [id number]

## 2024-03-21 NOTE — BRIEF OP NOTE
Date: 3/21/2024  OR Location: [facility] OR    Name: Manolo Bashir, : 1955, Age: 68 y.o., MRN: [id number], Sex: male    Diagnosis  Pre-op Diagnosis     * ESRD (end stage renal disease) (CMS/[facility]) [N18.6] Post-op Diagnosis     * ESRD (end stage renal disease) (CMS/[facility]) [N18.6]     Procedures  A-V Graft Upper Extremity  43875 - [place] CRTJ ARVEN FSTL XCP DIR ARVEN ANAST NONAUTOG GRF      Surgeons      * Hubmle South - Primary    Resident/Fellow/Other Assistant:  Surgeon(s) and Role: Iris Devlin MD PGY4    Procedure Summary  Anesthesia: General  ASA: III  Anesthesia Staff: Anesthesiologist: Chapo Aldrich MD  C-AA: PARVEEN Segovia  Estimated Blood Loss: 50mL  Intra-op Medications:   Administrations occurring from 1100 to 1425 on 24:   Medication Name Total Dose   bupivacaine PF (Marcaine) 0.25 % (2.5 mg/mL) injection 30 mL   polymyxin B injection 20,000 Units   heparin (porcine) 2,500 Units in sodium chloride 0.9 % 250 mL irrigation 2,500 Units              Anesthesia Record               Intraprocedure I/O Totals          Output    Est. Blood Loss 50 mL    Total Output 50 mL          Specimen: No specimens collected     Staff:   Circulator: Rosie Aldrich RN  Scrub Person: Harvey Rossi          Findings: LUE brachiobasilic AVG. Please see full op note for further details.    Complications:  None; patient tolerated the procedure well.     Disposition: PACU - hemodynamically stable.  Condition: stable  Specimens Collected: No specimens collected  Attending Attestation: I was present and scrubbed for the entire procedure.    Humble South  Phone Number: [phone]

## 2024-03-22 NOTE — OP NOTE
A-V Graft Upper Extremity (L) Operative Note     Date: 3/21/2024  OR Location: [facility] OR    Name: Manolo Bashir, : 1955, Age: 68 y.o., MRN: [id number], Sex: male    Diagnosis  Pre-op Diagnosis     * ESRD (end stage renal disease) (CMS/[facility]) [N18.6] Post-op Diagnosis     * ESRD (end stage renal disease) (CMS/[facility]) [N18.6]     Procedures  AVG (brachio-axillary)       Surgeons      * [facility]ble South - Primary    Resident/Fellow/Other Assistant:  Surgeon(s) and Role:    Procedure Summary  Anesthesia: General  ASA: III  Anesthesia Staff: Anesthesiologist: Chapo Aldrich MD  C-AA: PARVEEN Segovia  Estimated Blood Loss: 50 mL  Intra-op Medications:   Administrations occurring from 1100 to 1425 on 24:   Medication Name Total Dose   bupivacaine PF (Marcaine) 0.25 % (2.5 mg/mL) injection 30 mL   polymyxin B injection 20,000 Units   heparin (porcine) 2,500 Units in sodium chloride 0.9 % 250 mL irrigation 2,500 Units   oxyCODONE (Roxicodone) immediate release tablet 5 mg 5 mg   oxygen (O2) therapy 120 L              Anesthesia Record               Intraprocedure I/O Totals          Intake    NaCl 0.9 % bolus 200.00 mL    Total Intake 200 mL       Output    Est. Blood Loss 50 mL    Total Output 50 mL       Net    Net Volume 150 mL          Specimen: No specimens collected     Staff:   Circulator: Rosie Aldrich RN  Scrub Person: Harvey Brown; Mateusz Rossi         Drains and/or Catheters: * None in log *    Tourniquet Times:         Implants:  Implants       Type Name Action Serial No.      Implant GRAFT, VASCULAR, PROPATEN, 4-7 MM X 45 CM, STD WALL, EPTFE-HEP - [id number] - [id number] Implanted [id number]              Findings:   Calcified brachial artery      Indications: Manolo Bashir is an 68 y.o. male who is having surgery for AVG creation for dialysis access    The patient was seen in the preoperative area. The risks, benefits, complications, treatment options, non-operative alternatives, expected  recovery and outcomes were discussed with the patient. The possibilities of reaction to medication, pulmonary aspiration, injury to surrounding structures, bleeding, recurrent infection, the need for additional procedures, failure to diagnose a condition, and creating a complication requiring transfusion or operation were discussed with the patient. The patient concurred with the proposed plan, giving informed consent.  The site of surgery was properly noted/marked if necessary per policy. The patient has been actively warmed in preoperative area. Preoperative antibiotics have been ordered and given within 1 hours of incision. Venous thrombosis prophylaxis have been ordered including bilateral sequential compression devices    Procedure Details:       The patient was brought to the operating room, placed in a supine position, a huddle was performed. Sequential compression devices were placed and general endotracheal anesthesia was induced.  The patient was given IV antibiotics. Appropriate lines were placed by Anesthesia service. Ultrasound evaluation of the veins were performed confirming no suitable peripheral veins for fistula creation. Axillary vein appears patent and brachial artery is normal. The arm was then shaved, prepped, and draped in the usual sterile fashion. And a 2nd time out was carried out.  An axillary incision was made. The subcutaneous tissue was opened with electrocautery. The axillary vein was identified and isolated. The vein was of good caliber and suitable for AVG anastomosis. We then turned our attention to the brachial artery. A vertical incision was made just above the antecubital fossa. After incising the underlying fascia, the brachial artery was identified and isolated. The artery had moderate atherosclerotic disease. We selected Propatent 4-7mm graft and soaked it in antibiotic solution. Using a , we positioned the graft superficially under the skin. The patient was  heparinized. The axillary vein was clamped with a small Satinsky clamp. An appropriate size venotomy was made and flushed with hep-saline. Anastomosis was created between the 7mm end of the graft with the axillary vein in end-to-side fashion using 6-0 prolene in running fashion. After the completion, the clamp was released which showed minimal bleeding from anastomosis. There was good venous back flow bleeding into the proximal graft, which was flushed and reclamped. Next, the proximal and distal control of the brachial artery was obtained. A small longitudinal arteriotomy was made. The 4mm end of the graft was sewn to the brachial artery in an end-to-side fashion using 6-0 prolene. The artery was unclamped and the flow was established. Surgical hemostasis was obtained. The patient was quite coagulopathic. We reversed heparin with protamine. After applying Surgicel and gentle pressure, complete hemostasis was obtained. There was a good thrill in the graft fistula and a triphasic radial doppler signal distally. After checking hemostasis again, both incisions were closed in 2 layers with 3-0 Vicryl and 4-0 Monocryl. The thrill was checked again and present. Patient tolerated the procedure well and was extubated and transferred to recovery. All counts were correct and I was present in the entire procedure.     Complications:  None; patient tolerated the procedure well.    Disposition: PACU - hemodynamically stable.  Condition: stable         Attending Attestation: I was present and scrubbed for the entire procedure.    Humble South  Phone Number: [phone]

## 2024-03-27 ENCOUNTER — APPOINTMENT (OUTPATIENT)
Dept: HEMATOLOGY/ONCOLOGY | Facility: HOSPITAL | Age: 69
End: 2024-03-27
Payer: COMMERCIAL

## 2024-03-30 DIAGNOSIS — E55.9 VITAMIN D DEFICIENCY: ICD-10-CM

## 2024-03-30 PROCEDURE — RXMED WILLOW AMBULATORY MEDICATION CHARGE

## 2024-04-01 ENCOUNTER — LAB (OUTPATIENT)
Dept: LAB | Facility: LAB | Age: 69
End: 2024-04-01
Payer: COMMERCIAL

## 2024-04-01 DIAGNOSIS — N18.4 ACUTE RENAL FAILURE WITH ACUTE RENAL CORTICAL NECROSIS SUPERIMPOSED ON STAGE 4 CHRONIC KIDNEY DISEASE (MULTI): ICD-10-CM

## 2024-04-01 DIAGNOSIS — Z94.0 KIDNEY REPLACED BY TRANSPLANT (HHS-HCC): ICD-10-CM

## 2024-04-01 DIAGNOSIS — D47.2 MGUS (MONOCLONAL GAMMOPATHY OF UNKNOWN SIGNIFICANCE): ICD-10-CM

## 2024-04-01 DIAGNOSIS — N18.6 ESRD (END STAGE RENAL DISEASE) (MULTI): ICD-10-CM

## 2024-04-01 DIAGNOSIS — N17.1 ACUTE RENAL FAILURE WITH ACUTE RENAL CORTICAL NECROSIS SUPERIMPOSED ON STAGE 4 CHRONIC KIDNEY DISEASE (MULTI): ICD-10-CM

## 2024-04-01 LAB
ALBUMIN SERPL BCP-MCNC: 3.3 G/DL (ref 3.4–5)
ALP SERPL-CCNC: 49 U/L (ref 33–136)
ALT SERPL W P-5'-P-CCNC: 17 U/L (ref 10–52)
ANION GAP SERPL CALC-SCNC: 13 MMOL/L (ref 10–20)
AST SERPL W P-5'-P-CCNC: 17 U/L (ref 9–39)
BASOPHILS # BLD AUTO: 0.02 X10*3/UL (ref 0–0.1)
BASOPHILS NFR BLD AUTO: 0.6 %
BILIRUB SERPL-MCNC: 0.3 MG/DL (ref 0–1.2)
BUN SERPL-MCNC: 61 MG/DL (ref 6–23)
CALCIUM SERPL-MCNC: 10 MG/DL (ref 8.6–10.6)
CHLORIDE SERPL-SCNC: 107 MMOL/L (ref 98–107)
CO2 SERPL-SCNC: 28 MMOL/L (ref 21–32)
CREAT SERPL-MCNC: 6.79 MG/DL (ref 0.5–1.3)
EGFRCR SERPLBLD CKD-EPI 2021: 8 ML/MIN/1.73M*2
EOSINOPHIL # BLD AUTO: 0.09 X10*3/UL (ref 0–0.7)
EOSINOPHIL NFR BLD AUTO: 2.8 %
ERYTHROCYTE [DISTWIDTH] IN BLOOD BY AUTOMATED COUNT: 13.6 % (ref 11.5–14.5)
GLUCOSE SERPL-MCNC: 113 MG/DL (ref 74–99)
HCT VFR BLD AUTO: 23.6 % (ref 41–52)
HGB BLD-MCNC: 7.4 G/DL (ref 13.5–17.5)
IMM GRANULOCYTES # BLD AUTO: 0.05 X10*3/UL (ref 0–0.7)
IMM GRANULOCYTES NFR BLD AUTO: 1.5 % (ref 0–0.9)
LYMPHOCYTES # BLD AUTO: 0.78 X10*3/UL (ref 1.2–4.8)
LYMPHOCYTES NFR BLD AUTO: 24.1 %
MCH RBC QN AUTO: 29.2 PG (ref 26–34)
MCHC RBC AUTO-ENTMCNC: 31.4 G/DL (ref 32–36)
MCV RBC AUTO: 93 FL (ref 80–100)
MONOCYTES # BLD AUTO: 0.47 X10*3/UL (ref 0.1–1)
MONOCYTES NFR BLD AUTO: 14.5 %
NEUTROPHILS # BLD AUTO: 1.83 X10*3/UL (ref 1.2–7.7)
NEUTROPHILS NFR BLD AUTO: 56.5 %
NRBC BLD-RTO: 0 /100 WBCS (ref 0–0)
PHOSPHATE SERPL-MCNC: 3.1 MG/DL (ref 2.5–4.9)
PLATELET # BLD AUTO: 110 X10*3/UL (ref 150–450)
POTASSIUM SERPL-SCNC: 4.7 MMOL/L (ref 3.5–5.3)
PROT SERPL-MCNC: 5.8 G/DL (ref 6.4–8.2)
RBC # BLD AUTO: 2.53 X10*6/UL (ref 4.5–5.9)
SODIUM SERPL-SCNC: 143 MMOL/L (ref 136–145)
TACROLIMUS BLD-MCNC: 3.7 NG/ML
WBC # BLD AUTO: 3.2 X10*3/UL (ref 4.4–11.3)

## 2024-04-01 PROCEDURE — 80197 ASSAY OF TACROLIMUS: CPT

## 2024-04-01 PROCEDURE — 82668 ASSAY OF ERYTHROPOIETIN: CPT

## 2024-04-01 PROCEDURE — 84100 ASSAY OF PHOSPHORUS: CPT

## 2024-04-01 PROCEDURE — 80053 COMPREHEN METABOLIC PANEL: CPT

## 2024-04-01 PROCEDURE — 85025 COMPLETE CBC W/AUTO DIFF WBC: CPT

## 2024-04-01 PROCEDURE — 36415 COLL VENOUS BLD VENIPUNCTURE: CPT

## 2024-04-02 ENCOUNTER — TELEMEDICINE (OUTPATIENT)
Dept: HEMATOLOGY/ONCOLOGY | Facility: HOSPITAL | Age: 69
End: 2024-04-02
Payer: COMMERCIAL

## 2024-04-02 DIAGNOSIS — N18.6 ESRD (END STAGE RENAL DISEASE) (MULTI): ICD-10-CM

## 2024-04-02 DIAGNOSIS — D47.2 MGUS (MONOCLONAL GAMMOPATHY OF UNKNOWN SIGNIFICANCE): Primary | ICD-10-CM

## 2024-04-02 DIAGNOSIS — D63.8 ANEMIA OF CHRONIC DISEASE: ICD-10-CM

## 2024-04-02 PROCEDURE — 1159F MED LIST DOCD IN RCRD: CPT

## 2024-04-02 PROCEDURE — 3051F HG A1C>EQUAL 7.0%<8.0%: CPT

## 2024-04-02 PROCEDURE — 3008F BODY MASS INDEX DOCD: CPT

## 2024-04-02 PROCEDURE — 3060F POS MICROALBUMINURIA REV: CPT

## 2024-04-02 PROCEDURE — 1160F RVW MEDS BY RX/DR IN RCRD: CPT

## 2024-04-02 PROCEDURE — 99213 OFFICE O/P EST LOW 20 MIN: CPT

## 2024-04-02 PROCEDURE — 99213 OFFICE O/P EST LOW 20 MIN: CPT | Mod: GT

## 2024-04-02 PROCEDURE — 1111F DSCHRG MED/CURRENT MED MERGE: CPT

## 2024-04-02 RX ORDER — PERMETHRIN 50 MG/G
CREAM TOPICAL DAILY
Qty: 60 G | Refills: 0 | Status: SHIPPED | OUTPATIENT
Start: 2024-04-02 | End: 2024-05-19 | Stop reason: HOSPADM

## 2024-04-02 ASSESSMENT — ENCOUNTER SYMPTOMS
NEUROLOGICAL NEGATIVE: 1
PSYCHIATRIC NEGATIVE: 1
SHORTNESS OF BREATH: 1
HEMATOLOGIC/LYMPHATIC NEGATIVE: 1
EYES NEGATIVE: 1
ENDOCRINE NEGATIVE: 1
FATIGUE: 1
GASTROINTESTINAL NEGATIVE: 1

## 2024-04-03 ENCOUNTER — APPOINTMENT (OUTPATIENT)
Dept: RADIOLOGY | Facility: HOSPITAL | Age: 69
End: 2024-04-03
Payer: COMMERCIAL

## 2024-04-03 ENCOUNTER — TELEPHONE (OUTPATIENT)
Dept: TRANSPLANT | Facility: HOSPITAL | Age: 69
End: 2024-04-03
Payer: COMMERCIAL

## 2024-04-03 ENCOUNTER — TELEPHONE (OUTPATIENT)
Dept: TRANSPLANT | Facility: HOSPITAL | Age: 69
End: 2024-04-03

## 2024-04-03 ENCOUNTER — HOSPITAL ENCOUNTER (INPATIENT)
Facility: HOSPITAL | Age: 69
LOS: 3 days | Discharge: HOME | End: 2024-04-06
Attending: EMERGENCY MEDICINE | Admitting: INTERNAL MEDICINE
Payer: COMMERCIAL

## 2024-04-03 ENCOUNTER — CLINICAL SUPPORT (OUTPATIENT)
Dept: EMERGENCY MEDICINE | Facility: HOSPITAL | Age: 69
End: 2024-04-03
Payer: COMMERCIAL

## 2024-04-03 DIAGNOSIS — Z94.0 IMMUNOSUPPRESSIVE MANAGEMENT ENCOUNTER FOLLOWING KIDNEY TRANSPLANT (HHS-HCC): ICD-10-CM

## 2024-04-03 DIAGNOSIS — D63.1 ANEMIA DUE TO CHRONIC KIDNEY DISEASE, UNSPECIFIED CKD STAGE: ICD-10-CM

## 2024-04-03 DIAGNOSIS — J96.01 ACUTE HYPOXIC RESPIRATORY FAILURE (MULTI): Primary | ICD-10-CM

## 2024-04-03 DIAGNOSIS — N18.9 ANEMIA DUE TO CHRONIC KIDNEY DISEASE, UNSPECIFIED CKD STAGE: ICD-10-CM

## 2024-04-03 DIAGNOSIS — N28.9 RENAL FUNCTION IMPAIRMENT: ICD-10-CM

## 2024-04-03 DIAGNOSIS — I95.2 HYPOTENSION DUE TO MEDICATION: ICD-10-CM

## 2024-04-03 DIAGNOSIS — E87.70 HYPERVOLEMIA, UNSPECIFIED HYPERVOLEMIA TYPE: ICD-10-CM

## 2024-04-03 DIAGNOSIS — E16.2 HYPOGLYCEMIA: ICD-10-CM

## 2024-04-03 DIAGNOSIS — K21.9 GASTROESOPHAGEAL REFLUX DISEASE WITHOUT ESOPHAGITIS: ICD-10-CM

## 2024-04-03 DIAGNOSIS — E87.79 OTHER HYPERVOLEMIA: ICD-10-CM

## 2024-04-03 DIAGNOSIS — Z94.0 KIDNEY REPLACED BY TRANSPLANT (HHS-HCC): ICD-10-CM

## 2024-04-03 DIAGNOSIS — N18.6 ESRD (END STAGE RENAL DISEASE) (MULTI): ICD-10-CM

## 2024-04-03 DIAGNOSIS — Z79.899 IMMUNOSUPPRESSIVE MANAGEMENT ENCOUNTER FOLLOWING KIDNEY TRANSPLANT (HHS-HCC): ICD-10-CM

## 2024-04-03 DIAGNOSIS — Z94.0 KIDNEY TRANSPLANTED (HHS-HCC): ICD-10-CM

## 2024-04-03 DIAGNOSIS — I15.1 HYPERTENSION SECONDARY TO OTHER RENAL DISORDERS: ICD-10-CM

## 2024-04-03 LAB
ALBUMIN SERPL BCP-MCNC: 3.3 G/DL (ref 3.4–5)
ALP SERPL-CCNC: 46 U/L (ref 33–136)
ALT SERPL W P-5'-P-CCNC: 26 U/L (ref 10–52)
ANION GAP SERPL CALC-SCNC: 7 MMOL/L (ref 10–20)
APPEARANCE UR: CLEAR
AST SERPL W P-5'-P-CCNC: 28 U/L (ref 9–39)
ATRIAL RATE: 166 BPM
BASOPHILS # BLD AUTO: 0.03 X10*3/UL (ref 0–0.1)
BASOPHILS NFR BLD AUTO: 0.7 %
BILIRUB SERPL-MCNC: 0.3 MG/DL (ref 0–1.2)
BILIRUB UR STRIP.AUTO-MCNC: NEGATIVE MG/DL
BNP SERPL-MCNC: 674 PG/ML (ref 0–99)
BUN SERPL-MCNC: 61 MG/DL (ref 6–23)
CALCIUM SERPL-MCNC: 9.9 MG/DL (ref 8.6–10.6)
CARDIAC TROPONIN I PNL SERPL HS: 35 NG/L (ref 0–53)
CARDIAC TROPONIN I PNL SERPL HS: 38 NG/L (ref 0–53)
CHLORIDE SERPL-SCNC: 105 MMOL/L (ref 98–107)
CO2 SERPL-SCNC: 32 MMOL/L (ref 21–32)
COLOR UR: ABNORMAL
CREAT SERPL-MCNC: 6.8 MG/DL (ref 0.5–1.3)
EGFRCR SERPLBLD CKD-EPI 2021: 8 ML/MIN/1.73M*2
EOSINOPHIL # BLD AUTO: 0.11 X10*3/UL (ref 0–0.7)
EOSINOPHIL NFR BLD AUTO: 2.4 %
EPO SERPL-ACNC: 21 MU/ML (ref 4–27)
ERYTHROCYTE [DISTWIDTH] IN BLOOD BY AUTOMATED COUNT: 13.7 % (ref 11.5–14.5)
FLUAV RNA RESP QL NAA+PROBE: NOT DETECTED
FLUBV RNA RESP QL NAA+PROBE: NOT DETECTED
GLUCOSE SERPL-MCNC: 70 MG/DL (ref 74–99)
GLUCOSE UR STRIP.AUTO-MCNC: NORMAL MG/DL
HCT VFR BLD AUTO: 22.4 % (ref 41–52)
HGB BLD-MCNC: 7.6 G/DL (ref 13.5–17.5)
IMM GRANULOCYTES # BLD AUTO: 0.12 X10*3/UL (ref 0–0.7)
IMM GRANULOCYTES NFR BLD AUTO: 2.6 % (ref 0–0.9)
KETONES UR STRIP.AUTO-MCNC: NEGATIVE MG/DL
LEUKOCYTE ESTERASE UR QL STRIP.AUTO: NEGATIVE
LYMPHOCYTES # BLD AUTO: 0.69 X10*3/UL (ref 1.2–4.8)
LYMPHOCYTES NFR BLD AUTO: 15.1 %
MCH RBC QN AUTO: 29.9 PG (ref 26–34)
MCHC RBC AUTO-ENTMCNC: 33.9 G/DL (ref 32–36)
MCV RBC AUTO: 88 FL (ref 80–100)
MONOCYTES # BLD AUTO: 0.53 X10*3/UL (ref 0.1–1)
MONOCYTES NFR BLD AUTO: 11.6 %
MUCOUS THREADS #/AREA URNS AUTO: NORMAL /LPF
NEUTROPHILS # BLD AUTO: 3.09 X10*3/UL (ref 1.2–7.7)
NEUTROPHILS NFR BLD AUTO: 67.6 %
NITRITE UR QL STRIP.AUTO: NEGATIVE
NRBC BLD-RTO: 0 /100 WBCS (ref 0–0)
P AXIS: 71 DEGREES
P OFFSET: 166 MS
P ONSET: 125 MS
PH UR STRIP.AUTO: 5.5 [PH]
PLATELET # BLD AUTO: 145 X10*3/UL (ref 150–450)
POTASSIUM SERPL-SCNC: 5 MMOL/L (ref 3.5–5.3)
PR INTERVAL: 180 MS
PROT SERPL-MCNC: 6.1 G/DL (ref 6.4–8.2)
PROT UR STRIP.AUTO-MCNC: ABNORMAL MG/DL
Q ONSET: 215 MS
QRS COUNT: 9 BEATS
QRS DURATION: 140 MS
QT INTERVAL: 464 MS
QTC CALCULATION(BAZETT): 447 MS
QTC FREDERICIA: 453 MS
R AXIS: -24 DEGREES
RBC # BLD AUTO: 2.54 X10*6/UL (ref 4.5–5.9)
RBC # UR STRIP.AUTO: NEGATIVE /UL
RBC #/AREA URNS AUTO: NORMAL /HPF
SARS-COV-2 RNA RESP QL NAA+PROBE: NOT DETECTED
SODIUM SERPL-SCNC: 139 MMOL/L (ref 136–145)
SP GR UR STRIP.AUTO: 1.01
SQUAMOUS #/AREA URNS AUTO: NORMAL /HPF
T AXIS: 33 DEGREES
T OFFSET: 447 MS
UROBILINOGEN UR STRIP.AUTO-MCNC: NORMAL MG/DL
VENTRICULAR RATE: 56 BPM
WBC # BLD AUTO: 4.6 X10*3/UL (ref 4.4–11.3)
WBC #/AREA URNS AUTO: NORMAL /HPF

## 2024-04-03 PROCEDURE — 81001 URINALYSIS AUTO W/SCOPE: CPT

## 2024-04-03 PROCEDURE — 36415 COLL VENOUS BLD VENIPUNCTURE: CPT

## 2024-04-03 PROCEDURE — 93010 ELECTROCARDIOGRAM REPORT: CPT | Performed by: PHYSICIAN ASSISTANT

## 2024-04-03 PROCEDURE — 93005 ELECTROCARDIOGRAM TRACING: CPT

## 2024-04-03 PROCEDURE — 1210000001 HC SEMI-PRIVATE ROOM DAILY

## 2024-04-03 PROCEDURE — 71045 X-RAY EXAM CHEST 1 VIEW: CPT

## 2024-04-03 PROCEDURE — 87636 SARSCOV2 & INF A&B AMP PRB: CPT | Performed by: EMERGENCY MEDICINE

## 2024-04-03 PROCEDURE — 84484 ASSAY OF TROPONIN QUANT: CPT

## 2024-04-03 PROCEDURE — 2500000002 HC RX 250 W HCPCS SELF ADMINISTERED DRUGS (ALT 637 FOR MEDICARE OP, ALT 636 FOR OP/ED)

## 2024-04-03 PROCEDURE — 2500000001 HC RX 250 WO HCPCS SELF ADMINISTERED DRUGS (ALT 637 FOR MEDICARE OP)

## 2024-04-03 PROCEDURE — 93308 TTE F-UP OR LMTD: CPT | Performed by: STUDENT IN AN ORGANIZED HEALTH CARE EDUCATION/TRAINING PROGRAM

## 2024-04-03 PROCEDURE — 71045 X-RAY EXAM CHEST 1 VIEW: CPT | Mod: FOREIGN READ | Performed by: RADIOLOGY

## 2024-04-03 PROCEDURE — 71250 CT THORAX DX C-: CPT

## 2024-04-03 PROCEDURE — 84075 ASSAY ALKALINE PHOSPHATASE: CPT

## 2024-04-03 PROCEDURE — 2500000005 HC RX 250 GENERAL PHARMACY W/O HCPCS

## 2024-04-03 PROCEDURE — 99285 EMERGENCY DEPT VISIT HI MDM: CPT | Mod: 25

## 2024-04-03 PROCEDURE — 83880 ASSAY OF NATRIURETIC PEPTIDE: CPT | Performed by: EMERGENCY MEDICINE

## 2024-04-03 PROCEDURE — 96374 THER/PROPH/DIAG INJ IV PUSH: CPT | Mod: 59

## 2024-04-03 PROCEDURE — 85025 COMPLETE CBC W/AUTO DIFF WBC: CPT

## 2024-04-03 PROCEDURE — 99285 EMERGENCY DEPT VISIT HI MDM: CPT | Performed by: EMERGENCY MEDICINE

## 2024-04-03 PROCEDURE — 71250 CT THORAX DX C-: CPT | Mod: FOREIGN READ | Performed by: RADIOLOGY

## 2024-04-03 PROCEDURE — RXMED WILLOW AMBULATORY MEDICATION CHARGE

## 2024-04-03 PROCEDURE — 2500000004 HC RX 250 GENERAL PHARMACY W/ HCPCS (ALT 636 FOR OP/ED): Mod: SE | Performed by: STUDENT IN AN ORGANIZED HEALTH CARE EDUCATION/TRAINING PROGRAM

## 2024-04-03 PROCEDURE — 2500000004 HC RX 250 GENERAL PHARMACY W/ HCPCS (ALT 636 FOR OP/ED)

## 2024-04-03 RX ORDER — CINACALCET 30 MG/1
30 TABLET, FILM COATED ORAL DAILY
Status: DISCONTINUED | OUTPATIENT
Start: 2024-04-03 | End: 2024-04-06 | Stop reason: HOSPADM

## 2024-04-03 RX ORDER — HYDRALAZINE HYDROCHLORIDE 25 MG/1
50 TABLET, FILM COATED ORAL 3 TIMES DAILY
Status: DISCONTINUED | OUTPATIENT
Start: 2024-04-03 | End: 2024-04-04

## 2024-04-03 RX ORDER — AZATHIOPRINE 50 MG/1
25 TABLET ORAL DAILY
Status: DISCONTINUED | OUTPATIENT
Start: 2024-04-03 | End: 2024-04-05

## 2024-04-03 RX ORDER — INSULIN GLARGINE 100 [IU]/ML
15 INJECTION, SOLUTION SUBCUTANEOUS EVERY 24 HOURS
Status: DISCONTINUED | OUTPATIENT
Start: 2024-04-04 | End: 2024-04-06 | Stop reason: HOSPADM

## 2024-04-03 RX ORDER — POLYETHYLENE GLYCOL 3350 17 G/17G
17 POWDER, FOR SOLUTION ORAL DAILY
Status: DISCONTINUED | OUTPATIENT
Start: 2024-04-03 | End: 2024-04-06 | Stop reason: HOSPADM

## 2024-04-03 RX ORDER — NAPROXEN SODIUM 220 MG/1
81 TABLET, FILM COATED ORAL DAILY
Status: DISCONTINUED | OUTPATIENT
Start: 2024-04-03 | End: 2024-04-06 | Stop reason: HOSPADM

## 2024-04-03 RX ORDER — DEXTROSE 50 % IN WATER (D50W) INTRAVENOUS SYRINGE
12.5
Status: DISCONTINUED | OUTPATIENT
Start: 2024-04-03 | End: 2024-04-06 | Stop reason: HOSPADM

## 2024-04-03 RX ORDER — PERMETHRIN 50 MG/G
CREAM TOPICAL DAILY
Status: DISCONTINUED | OUTPATIENT
Start: 2024-04-03 | End: 2024-04-06 | Stop reason: HOSPADM

## 2024-04-03 RX ORDER — DEXTROSE 50 % IN WATER (D50W) INTRAVENOUS SYRINGE
25
Status: DISCONTINUED | OUTPATIENT
Start: 2024-04-03 | End: 2024-04-06 | Stop reason: HOSPADM

## 2024-04-03 RX ORDER — HEPARIN SODIUM 5000 [USP'U]/ML
5000 INJECTION, SOLUTION INTRAVENOUS; SUBCUTANEOUS EVERY 8 HOURS
Status: DISCONTINUED | OUTPATIENT
Start: 2024-04-03 | End: 2024-04-06 | Stop reason: HOSPADM

## 2024-04-03 RX ORDER — LIDOCAINE 560 MG/1
1 PATCH PERCUTANEOUS; TOPICAL; TRANSDERMAL DAILY
Status: DISCONTINUED | OUTPATIENT
Start: 2024-04-03 | End: 2024-04-06 | Stop reason: HOSPADM

## 2024-04-03 RX ORDER — TACROLIMUS 1 MG/G
OINTMENT TOPICAL 2 TIMES DAILY
Status: DISCONTINUED | OUTPATIENT
Start: 2024-04-03 | End: 2024-04-06 | Stop reason: HOSPADM

## 2024-04-03 RX ORDER — CHOLECALCIFEROL (VITAMIN D3) 25 MCG
1000 TABLET ORAL DAILY
Status: DISCONTINUED | OUTPATIENT
Start: 2024-04-03 | End: 2024-04-06 | Stop reason: HOSPADM

## 2024-04-03 RX ORDER — ACETAMINOPHEN 325 MG/1
975 TABLET ORAL EVERY 6 HOURS PRN
Status: DISCONTINUED | OUTPATIENT
Start: 2024-04-03 | End: 2024-04-06 | Stop reason: HOSPADM

## 2024-04-03 RX ORDER — ISOSORBIDE MONONITRATE 30 MG/1
30 TABLET, EXTENDED RELEASE ORAL DAILY
Status: DISCONTINUED | OUTPATIENT
Start: 2024-04-04 | End: 2024-04-03

## 2024-04-03 RX ORDER — ISOSORBIDE MONONITRATE 60 MG/1
60 TABLET, EXTENDED RELEASE ORAL DAILY
Status: DISCONTINUED | OUTPATIENT
Start: 2024-04-04 | End: 2024-04-06 | Stop reason: HOSPADM

## 2024-04-03 RX ORDER — PREDNISONE 5 MG/1
5 TABLET ORAL DAILY
Status: DISCONTINUED | OUTPATIENT
Start: 2024-04-03 | End: 2024-04-06 | Stop reason: HOSPADM

## 2024-04-03 RX ORDER — ISOSORBIDE MONONITRATE 30 MG/1
60 TABLET, EXTENDED RELEASE ORAL DAILY
Status: DISCONTINUED | OUTPATIENT
Start: 2024-04-03 | End: 2024-04-03

## 2024-04-03 RX ORDER — BUMETANIDE 0.25 MG/ML
2 INJECTION INTRAMUSCULAR; INTRAVENOUS ONCE
Status: COMPLETED | OUTPATIENT
Start: 2024-04-03 | End: 2024-04-03

## 2024-04-03 RX ORDER — CALCITRIOL 0.25 UG/1
0.25 CAPSULE ORAL DAILY
Status: DISCONTINUED | OUTPATIENT
Start: 2024-04-03 | End: 2024-04-06 | Stop reason: HOSPADM

## 2024-04-03 RX ORDER — SULFAMETHOXAZOLE AND TRIMETHOPRIM 400; 80 MG/1; MG/1
1 TABLET ORAL 2 TIMES DAILY
Status: DISCONTINUED | OUTPATIENT
Start: 2024-04-03 | End: 2024-04-06 | Stop reason: HOSPADM

## 2024-04-03 RX ORDER — BUMETANIDE 2 MG/1
2 TABLET ORAL 3 TIMES DAILY
Status: DISCONTINUED | OUTPATIENT
Start: 2024-04-03 | End: 2024-04-03

## 2024-04-03 RX ORDER — PRAVASTATIN SODIUM 20 MG/1
10 TABLET ORAL NIGHTLY
Status: DISCONTINUED | OUTPATIENT
Start: 2024-04-03 | End: 2024-04-06 | Stop reason: HOSPADM

## 2024-04-03 RX ORDER — NIFEDIPINE 60 MG/1
60 TABLET, FILM COATED, EXTENDED RELEASE ORAL 2 TIMES DAILY
Status: DISCONTINUED | OUTPATIENT
Start: 2024-04-03 | End: 2024-04-06 | Stop reason: HOSPADM

## 2024-04-03 RX ORDER — TACROLIMUS 0.5 MG/1
1.5 CAPSULE ORAL EVERY MORNING
Status: DISCONTINUED | OUTPATIENT
Start: 2024-04-04 | End: 2024-04-06 | Stop reason: HOSPADM

## 2024-04-03 RX ORDER — TACROLIMUS 1 MG/1
2 CAPSULE ORAL NIGHTLY
Status: DISCONTINUED | OUTPATIENT
Start: 2024-04-03 | End: 2024-04-06 | Stop reason: HOSPADM

## 2024-04-03 RX ORDER — INSULIN LISPRO 100 [IU]/ML
0-5 INJECTION, SOLUTION INTRAVENOUS; SUBCUTANEOUS
Status: DISCONTINUED | OUTPATIENT
Start: 2024-04-04 | End: 2024-04-06 | Stop reason: HOSPADM

## 2024-04-03 RX ORDER — DOCUSATE SODIUM 100 MG/1
100 CAPSULE, LIQUID FILLED ORAL DAILY PRN
Status: DISCONTINUED | OUTPATIENT
Start: 2024-04-03 | End: 2024-04-06 | Stop reason: HOSPADM

## 2024-04-03 RX ORDER — PANTOPRAZOLE SODIUM 40 MG/1
40 TABLET, DELAYED RELEASE ORAL
Status: DISCONTINUED | OUTPATIENT
Start: 2024-04-04 | End: 2024-04-06 | Stop reason: HOSPADM

## 2024-04-03 RX ORDER — BUMETANIDE 2 MG/1
2 TABLET ORAL 2 TIMES DAILY
Status: DISCONTINUED | OUTPATIENT
Start: 2024-04-03 | End: 2024-04-06 | Stop reason: HOSPADM

## 2024-04-03 RX ORDER — MULTIVIT-MIN/IRON FUM/FOLIC AC 7.5 MG-4
1 TABLET ORAL DAILY
Status: DISCONTINUED | OUTPATIENT
Start: 2024-04-03 | End: 2024-04-06 | Stop reason: HOSPADM

## 2024-04-03 RX ADMIN — TACROLIMUS 1 APPLICATION: 1 OINTMENT TOPICAL at 21:00

## 2024-04-03 RX ADMIN — LIDOCAINE 1 PATCH: 4 PATCH TOPICAL at 20:12

## 2024-04-03 RX ADMIN — BUMETANIDE 2 MG: 0.25 INJECTION INTRAMUSCULAR; INTRAVENOUS at 17:19

## 2024-04-03 RX ADMIN — HYDRALAZINE HYDROCHLORIDE 50 MG: 25 TABLET ORAL at 20:14

## 2024-04-03 RX ADMIN — HEPARIN SODIUM 5000 UNITS: 5000 INJECTION INTRAVENOUS; SUBCUTANEOUS at 18:59

## 2024-04-03 RX ADMIN — PERMETHRIN CREAM 5% W/W: 50 CREAM TOPICAL at 20:13

## 2024-04-03 RX ADMIN — SULFAMETHOXAZOLE AND TRIMETHOPRIM 1 TABLET: 400; 80 TABLET ORAL at 20:14

## 2024-04-03 RX ADMIN — TACROLIMUS 2 MG: 1 CAPSULE ORAL at 20:13

## 2024-04-03 RX ADMIN — NIFEDIPINE 60 MG: 60 TABLET, FILM COATED, EXTENDED RELEASE ORAL at 20:14

## 2024-04-03 RX ADMIN — CARVEDILOL 37.5 MG: 25 TABLET, FILM COATED ORAL at 20:14

## 2024-04-03 RX ADMIN — PRAVASTATIN SODIUM 10 MG: 20 TABLET ORAL at 20:12

## 2024-04-03 ASSESSMENT — ENCOUNTER SYMPTOMS
VOMITING: 1
HEADACHES: 1
CHILLS: 0
SHORTNESS OF BREATH: 1
LIGHT-HEADEDNESS: 1
FEVER: 0
ABDOMINAL PAIN: 1
FREQUENCY: 1
COUGH: 0
DIZZINESS: 1
NAUSEA: 1

## 2024-04-03 ASSESSMENT — LIFESTYLE VARIABLES
HAVE PEOPLE ANNOYED YOU BY CRITICIZING YOUR DRINKING: NO
TOTAL SCORE: 0
HAVE YOU EVER FELT YOU SHOULD CUT DOWN ON YOUR DRINKING: NO
EVER FELT BAD OR GUILTY ABOUT YOUR DRINKING: NO
EVER HAD A DRINK FIRST THING IN THE MORNING TO STEADY YOUR NERVES TO GET RID OF A HANGOVER: NO

## 2024-04-03 ASSESSMENT — PAIN SCALES - GENERAL
PAINLEVEL_OUTOF10: 0 - NO PAIN
PAINLEVEL_OUTOF10: 0 - NO PAIN

## 2024-04-03 ASSESSMENT — PAIN - FUNCTIONAL ASSESSMENT: PAIN_FUNCTIONAL_ASSESSMENT: 0-10

## 2024-04-03 NOTE — PROGRESS NOTES
Patient ID: Manolo Bashir is a 68 y.o. male.  Referring Physician: Elías Penn, APRN-CNP  [address] [place] [address]  Primary Care Provider: ZAIRE Armas, [facility]    Date of Service:  4/2/2024    MGUS    Mr. Bashir is a 68 year old gentleman with complex medical history who is referred after recent hospitalization for abdominal pain for further evaluation of evolving pancytopenia.  History of anemia for several years treated with iron supplements, does not recall being told about abnormal blood counts until last few months.  He has not required transfusion support and denies any recent infections.  Upon reviewing his blood counts, he had worsening anemia (10-11 down to 7.4 g/dL) and platelets (200s down to 69K) over last 2-3 months.  He has had chronic lymphopenia but has not been neutropenic.  He complains predominantly of GI symptoms including abdominal pain, nausea, and vomiting for which he has been hospitalized 2x recently without clear etiology.     Workup:    SPEP (10/18/23): 0.2g/dL IgA Lambda M protein, 0.1g/dL IgG Kappa M protein  SFLC (10/19/23): 8.23mg/dL Kappa FLC, 6.40mg/dL Lambda FLC, FLC ratio 1.29  Immunoglobulins (10/20/23): IgA 523  Spot UPEP (11/16/23): Marked proteinuria, 2% IgA Lambda M protein, 1% IgG Lambda M protein    BMBx (10/26/23):    -- HYPERCELLULAR BONE MARROW (50%) WITH MATURING TRILINEAGE HEMATOPOIESIS AND INVOLVED (5%) BY PLASMA CELL NEOPLASM    FISH NEGATIVE for gain of 1q, hyperdiploidy of 3,7 and 11, rearrangement of IGH, and deletion of TP53     Osseous survey (11/19/23):  IMPRESSION:  1. No evidence of suspicious osteolytic lesion in the axial or  appendicular skeleton.  2. Large amount of lobulated soft tissue prominence in the radial  aspect of the left distal upper arm containing interrupted  calcifications. This may represent fistula for hemodialysis and  clinical correlation as well as physical examination suggested.  3. Prominent vascular  calcifications of the extremities    Medical History:  ESRD s/p renal transplant (1992, 2013 (left kidney))  HTN  DM 2  HCV      SUBJECTIVE:  History of Present Illness:  Mr. Bashir presents to clinic 4/2/24 for a follow up phone call.    Overall he is not feeling well. Notes that he is fatigued. Also notes SOB on exertion. Unsure why his oral iron was stopped.       Review of Systems   Constitutional:  Positive for fatigue.   HENT: Negative.     Eyes: Negative.    Respiratory:  Positive for shortness of breath.    Cardiovascular:  Positive for leg swelling.   Gastrointestinal: Negative.    Endocrine: Negative.    Genitourinary: Negative.    Skin: Negative.    Allergic/Immunologic: Positive for immunocompromised state.   Neurological: Negative.    Hematological: Negative.    Psychiatric/Behavioral: Negative.       OBJECTIVE:  KPS: Karnofsky Score: 80 - Normal activity with effort; some signs or symptoms of disease   VS:  There were no vitals taken for this visit.  BSA: There is no height or weight on file to calculate BSA.    Laboratory:  The pertinent laboratory results were reviewed and discussed with the patient.    Lab Results   Component Value Date    WBC 3.2 (L) 04/01/2024    HCT 23.6 (L) 04/01/2024    HGB 7.4 (L) 04/01/2024     (L) 04/01/2024    K 4.7 04/01/2024    CALCIUM 10.0 04/01/2024     04/01/2024    MG 1.86 03/19/2024    ALT 17 04/01/2024    AST 17 04/01/2024    BUN 61 (H) 04/01/2024    CREATININE 6.79 (H) 04/01/2024    PHOS 3.1 04/01/2024    KAPPA 10.85 (H) 03/05/2024    LAMBDA 9.39 (H) 03/05/2024    KAPLS 1.16 03/05/2024    SPEP  03/05/2024     Aberrant band detected. See immunofixation.       Hypoalbuminemia.       IEPIN  03/05/2024     3/4/24 Known monoclonal IgA lambda in the beta region at 0.2 g/dL, monoclonal IgG lambda in the gamma region at 0.1 g/dL, and monoclonal IgG kappa in the gamma region at 0.1 g/dL. Unchanged from the previous analysis on 11/28/23.     03/05/2024     IGM 18 (L) 03/05/2024     (H) 03/05/2024      Note: for a comprehensive list of the patient's lab results, access the Results Review activity.    ASSESSMENT and PLAN:    MGUS:  - 3 M spikes found on workup for worsening pancytopenias: 0.2g/dL IgA Lambda, 0.1g/dL IgG Lambda, 0.1g/dL IgG Kappa  - Both LC elevated w/ normal FLC ratio  - IgA 523  - BMBx showed 5% plasma cells, consistent w/ MGUS  - Osseous survey negative    Pancytopenia:  - Longstanding history of anemia (of chronic disease?), taking oral Iron  - Recent decrease in both Hgb and Plts  - Hgb dropped again, likely r/t kidney failure   - Cause of cytopenias not noted on BMBx  - Given IV iron in hospital, currently oral is on hold    Renal:  - S/p L kidney transplant  - sCr increasing lately, s/p recent kidney biopsy  -  Prelim biopsy results showed proliferative GN with no signs of multiple myeloma   - Notes decreased urine output  - Has dialysis fistula in place     Cardiac:  - Pt notes overall feeling of bloating  - HTN (managed by nephrology)  - Recent elevation in BNP  - Echo showed EF 60-65%  - Referred to cardiology for proper workup   - Coreg 37.5mg BID, Hydralazine 50mg TID  - Pravastatin 10mg daily  - Lasix 20mg PRN     RTC:  6 months virtual w/ labs prior     Elías Penn, APRN-CNP

## 2024-04-03 NOTE — SIGNIFICANT EVENT
Please see the excellent intern/medical student note for the comprehensive H&P.    HPI  Manolo Bashir is a 68 y.o. male with PMHX of ESRD s/p 2 renal transplants (1992 followed by 2013), CKD 3, DM2, PRCA s/p radical prostatectomy, HCV (treated), and MGUS, presenting after an episode of dizziness with low blood pressure at home.     Patient reports that he occasionally has low blood pressures after he takes his medications in the morning accompanied with episodes of dizziness. Reports that he wakes up with headaches and high systolic blood pressures in the 200s then when he takes his meds it significantly drops. Patient says he check his blood pressure in the afternoon and is usually in 110s systolics. Patient says his friend Amalia who lives with him gives him his medications. When I called Amalia to inquire further about his medications, she says she gives him Carvedilol, Hydralazine, IMDUR, and Nifedipine around 9 AM. Hydralazine in the afternoon, and then Carvedilol, Hydralazine, and Nifedipine at night. Sometimes patient's BP is lower at night (SBP ~100) so he gets scared and does not take his blood pressure meds, and then wakes up with a very high BP and takes all his meds, which drops it to 90s. Upon further questioning, Amalia reported that patient is taking Hydralazine 150 mg TID instead of 100 mg TID. When he was last discharged, there were two orders (one for 100 mg TID, and one for 50 mg TID) so she assumed or told that means she should be giving him 150 mg TID.     Patient was admitted to the hospital on 3/16/2024 for shortness of breath and hypervolemia with . Patient was diuresed with Bumex drip after consulting transplant nephrology. 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 Hydralazine was increased from 50 to 100 TID on discharge due to elevated systolic blood pressure in the 180s.     Patient returned on 3/21/2024 and received a LUE AVG with general surgery. Patient is supposed to have an upcoming  appointment with transplant nephrology to evaluate needs for dialysis as he is currently not on dialysis schedule.     In the ED, patient's BP was 106/65 on presentation, went up to 152/87 by the time we evaluated him. Saturating 95% on 1.5L NC. He was given his Bumex in the ED. His labs largely unremarkable except for a chronically elevated Cr 6.8 i/s/o renal failure and albumin 3.3, and hemoglobin 7.6 (baseline 8-9).    Upon admission to the floor, he was resumed on his blood pressure medications except and Hydralazine was resumed at 100 mg instead of 150 mg TID.     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  Congo red negative for amyloid.  No evidence of light chain cast nephropathy.         Hematology history:     SPEP (10/18/23): 0.2g/dL IgA Lambda M protein, 0.1g/dL IgG Kappa M protein  SFLC (10/19/23): 8.23mg/dL Kappa FLC, 6.40mg/dL Lambda FLC, FLC ratio 1.29  Immunoglobulins (10/20/23): IgA 523  Spot UPEP (11/16/23): Marked proteinuria, 2% IgA Lambda M protein, 1% IgG Lambda M protein     BMBx (10/26/23):     -- HYPERCELLULAR BONE MARROW (50%) WITH MATURING TRILINEAGE HEMATOPOIESIS AND INVOLVED (5%) BY PLASMA CELL NEOPLASM     FISH NEGATIVE for gain of 1q, hyperdiploidy of 3,7 and 11, rearrangement of IGH, and deletion of TP53      Osseous survey (11/19/23):  IMPRESSION:  1. No evidence of suspicious osteolytic lesion in the axial or  appendicular skeleton.  2. Large amount of lobulated soft tissue prominence in the radial  aspect of the left distal upper arm containing  interrupted  calcifications. This may represent fistula for hemodialysis and  clinical correlation as well as physical examination suggested.  3. Prominent vascular calcifications of the extremities         - Imaging:  CT chest 4/3/2024:  IMPRESSION:  There is cardiomegaly with moderately large right and moderate left  pleural effusions.  Dense alveolar consolidation with air bronchograms  is seen in the posterior right lower lobe and to a lesser degree in  the posterior left lung base.  Patchy alveolar infiltrate is also seen  in the anterior left upper lobe.  There also is a question of mild  interstitial pulmonary edema.  There is no evidence of pneumothorax..      CXR 4/3/2024  IMPRESSION:  Improved airspace opacities with residual opacity in the right lower  lobe and small right pleural effusion.  Cardiomegaly and mild  prominence of the pulmonary vasculature.  Findings suggest component  of fluid overload.  Please correlate for pneumonia.    POCUS 4/3/2024  Findings:    Views: parasternal long, parasternal short and apical four   The pericardial space was visualized and was NEGATIVE for a significant   pericardial effusion       TTE 11/16/2023:  CONCLUSIONS:   1. Left ventricular systolic function is normal with a 60-65% estimated ejection fraction.   2. Left ventricular cavity size is moderately dilated.   3. The left ventricular posterior wall thickness is moderately increased.   4. There is severe concentric left ventricular hypertrophy.   5. Mild to moderate aortic valve regurgitation.   6. The left atrium is moderately dilated.   7. Compared with study from 6/21/2017, there is now moderate LV dysfunction and severe LV hypertrophy. The LV eyection fraction is unchanged. The aortic valve regurgitation has increased from trivial to mild to moderate.      Home Medications @ 3/11/2024  Acetaminophen, 975 mg, oral, q8h  Aspirin 81 mg daily  Imdur 60 mg tablet daily  carvedilol, 37.5 mg, oral, BID  losartan, 25  mg, oral, Daily  hydrALAZINE, 100 mg, oral, TID  Nifedipine 60 mg BID  Bumex 2 mg BID  pravastatin, 40 mg, oral, Nightly  predniSONE, 10 mg, oral, q AM  tacrolimus, 2.5 mg, oral, q12h ZOË  azaTHIOprine, 25 mg, oral, Daily  Bactrim 400-80 BID  calcitriol 0,25 mcg daily  cholecalciferol, 1,000 Units, oral, Daily  cinacalcet, 30 mg, oral, Daily  ferrous sulfate (325 mg ferrous sulfate), 65 mg of iron, oral, BID  heparin (porcine), 5,000 Units, subcutaneous, q8h ZOË  insulin glargine, 20 Units, subcutaneous, Daily  insulin lispro, 0-10 Units, subcutaneous, TID with meals  lidocaine, 1 patch, transdermal, Daily  loratadine, 10 mg, oral, Daily  melatonin, 5 mg, oral, Nightly  methocarbamol, 500 mg, oral, q8h ZOË  Lokelma 5 g packet daily  pantoprzole 40 mg    Constitutional: in NAD  HEENT: sclerae anicteric, EOM grossly intact  CV: RRR, no murmurs noted  Pulm: CTAB, no increased WOB  GI: abd soft, NT, ND  Skin: warm and dry  Ext: bilateral LE without pitting edema. LUE fistula no bruit  Neuro: alert and conversant  Psych: affect appropriate    Assessment & Plan  Manolo Bashir is a 68 y.o. male with PMHX of ESRD s/p 2 renal transplants (1992 followed by 2013), CKD 3, DM2, PRCA s/p radical prostatectomy, HCV (treated), and MGUS, presenting after an episode of dizziness with low blood pressure at home in the setting of misunderstanding of medication dosing of Hydralazine, receiving 150 mg TID instead of 100 mg TID as prescribed on last discharge.    Plan:  -Resume Coreg 37.5 mg BID, Nifedipine 60 mg BID, IMDUR 60 mg daily  -Restart Hydralazine at 100 mg TID instead of 150 mg TID  -Consider titrating up IMDUR for more stable and duration of BP control   -Provide education to patient and his friend Amalia on proper dosing of medications and blood pressure   -Consult nephrology or transplant nephrology consult while in patient for patient dialysis situation (currently making urine on Bumex 2 mg TID)  -Tacrolimus level at 6 AM    -Glargine to 15 units instead of 20 while inpatient  -Mild SSI while inpatient   -Continue Bactrim, prednisone, Tacrolimus, and Azathioprine  -Polypharmacy burden and nausea in the morning, consider discontinuing medications such as Aspirin and Pravastatin given no clear indication   -Continue Bumex 2 mg TID, pleural effusion stable, wean oxygen as likely for comfort         N: Low potassium diet  A: PIV    DVT ppx: Heparin Subq  GI ppx: Pantoprazole 40 mg    Code Status: Full Code (confirmed on Admission)  Surrogate Medical Decision-maker: Amalia Enriquez [phone]    These are preliminary recommendations. Patient will be formally discussed and staffed with attending in the AM.

## 2024-04-03 NOTE — ED PROCEDURE NOTE
Procedure    Performed by: Sigrid Espinosa MD  Authorized by: Sigrid Espinosa MD  Cardiac Indications: hypotension                Procedure: Cardiac Ultrasound    Findings:   Views: parasternal long, parasternal short and apical four  The pericardial space was visualized and was NEGATIVE for a significant pericardial effusion.  Activity: Ventricular contractions were visualized.  LV: LV systolic function was NORMAL.  RV: RV size was NORMAL.    Impression:  Cardiac: The focused cardiac ultrasound exam was NORMAL.                   Sigrid Espinosa MD  04/03/24 [id number]

## 2024-04-03 NOTE — ED PROVIDER NOTES
Emergency Department Encounter  [facility] EMERGENCY MEDICINE    Patient: Manolo Bashir  MRN: [id number]  : 1955  Date of Evaluation: 4/3/2024  ED Provider: Leobardo Harrington PA-C    ED care was supervised by Dr. Fair who independently examined and evaluated the patient. Please see their attestation note for further details.      Chief Complaint       Chief Complaint   Patient presents with    Dizziness     [place]    (Location/Symptom, Timing/Onset, Context/Setting, Quality, Duration, Modifying Factors, Severity) Note limiting factors.     Manolo Bashir is a 68 y.o. male who presents to the emergency department for dizziness.  Patient has a history of ESRD s/p renal transplant x 2 in  and  on immunosuppression, MGUS, pancytopenia, diabetes, hypertension, prostate cancer s/p radical prostatectomy.  Patient said he has been dizzy since about 9 AM today.  EMS was called because blood pressure was reportedly 80/30.  Patient said he was recently started on multiple medications a couple weeks ago and has been having increasing dizziness since then.  Describes the dizziness as if he is about to pass out.  No room spinning sensation.  Denies any chest pain, shortness of breath, nausea, vomiting, slurred speech, facial drooping, numbness/tingling in extremities.      Limitations to History: none  Historian: EMS  Records reviewed: EMR     Past History     Past Medical History:   Diagnosis Date    Anemia     Arthritis     Cataract     Chronic kidney disease, stage 3 unspecified (CMS/HCC) 2018    Stage 3 chronic kidney disease    CKD (chronic kidney disease)     stage V    COVID-19 2020    COVID-19 virus infection    Diabetes (CMS/HCC)     ESRD (end stage renal disease) (CMS/[facility])     Focal and segmental proliferative glomerulonephritis 2023    HTN (hypertension)     Hyperlipidemia     Other long term (current) drug therapy 2021    High risk medication use    Personal  history of other diseases of the circulatory system     Personal history of cardiac murmur    Personal history of other infectious and parasitic diseases 08/17/2015    History of hepatitis    Polyp, colonic 08/17/2023    Primary osteoarthritis of both ankles 08/17/2023    Prostate cancer (CMS/HCC)     Tubular adenoma of colon 08/17/2023    Unspecified kidney failure 08/17/2016    Renal failure     Past Surgical History:   Procedure Laterality Date    ILEOSTOMY  04/25/2017    Ileostomy    ILEOSTOMY CLOSURE  08/17/2015    Ileostomy Closure    OTHER SURGICAL HISTORY  04/21/2017    Right Hemicolectomy    OTHER SURGICAL HISTORY  08/17/2015    Arteriovenous Surgery Creation Of A-V Fistula    OTHER SURGICAL HISTORY  08/17/2015    Sigmoidoscopy (Fiberoptic, Therapeutic )    PROSTATECTOMY  10/11/2013    Prostatectomy Radical    TRANSPLANT, KIDNEY, OPEN  1992    TRANSPLANT, KIDNEY, OPEN  2013    US GUIDED PERCUTANEOUS BIOPSY RENAL LEFT Left 11/20/2023    US GUIDED PERCUTANEOUS BIOPSY RENAL LEFT 11/20/2023 Lou Rodgers MD [facility]    US GUIDED PERCUTANEOUS PERITONEAL OR RETROPERITONEAL FLUID COLLECTION DRAINAGE  10/20/2022    US GUIDED PERCUTANEOUS PERITONEAL OR RETROPERITONEAL FLUID COLLECTION DRAINAGE 10/20/2022 [facility] CLINICAL LEGACY     Social History     Socioeconomic History    Marital status: Single     Spouse name: None    Number of children: None    Years of education: None    Highest education level: None   Occupational History    None   Tobacco Use    Smoking status: Never     Passive exposure: Past    Smokeless tobacco: Never   Vaping Use    Vaping Use: Never used   Substance and Sexual Activity    Alcohol use: Yes    Drug use: Yes     Types: Oxycodone    Sexual activity: Defer   Other Topics Concern    None   Social History Narrative    None     Social Determinants of Health     Financial Resource Strain: Low Risk  (4/4/2024)    Overall Financial Resource Strain (CARDIA)     Difficulty of Paying Living Expenses: Not  hard at all   Food Insecurity: No Food Insecurity (10/3/2023)    Hunger Vital Sign     Worried About Running Out of Food in the Last Year: Never true     Ran Out of Food in the Last Year: Never true   Transportation Needs: No Transportation Needs (4/4/2024)    PRAPARE - Transportation     Lack of Transportation (Medical): No     Lack of Transportation (Non-Medical): No   Physical Activity: Unknown (4/4/2024)    Exercise Vital Sign     Days of Exercise per Week: Patient unable to answer     Minutes of Exercise per Session: 30 min   Stress: No Stress Concern Present (10/3/2023)    [employer] [place] [employer] - Occupational Stress Questionnaire     Feeling of Stress : Not at all   Social Connections: Unknown (10/3/2023)    Social Connection and Isolation Panel [NHANES]     Frequency of Communication with Friends and Family: More than three times a week     Frequency of Social Gatherings with Friends and Family: Twice a week     Attends Sikhism Services: Patient declined     Active Member of Clubs or Organizations: Patient declined     Attends Club or Organization Meetings: Patient declined     Marital Status: Never    Intimate Partner Violence: Not At Risk (10/3/2023)    Humiliation, Afraid, Rape, and Kick questionnaire     Fear of Current or Ex-Partner: No     Emotionally Abused: No     Physically Abused: No     Sexually Abused: No   Housing Stability: Low Risk  (4/4/2024)    Housing Stability Vital Sign     Unable to Pay for Housing in the Last Year: No     Number of Places Lived in the Last Year: 1     Unstable Housing in the Last Year: No         Medications/Allergies     Current Discharge Medication List        CONTINUE these medications which have NOT CHANGED    Details   acetaminophen (Tylenol) 325 mg tablet Take 3 tablets (975 mg) by mouth every 6 hours if needed (pain).  Qty: 30 tablet, Refills: 11    Associated Diagnoses: Kidney replaced by transplant      aspirin 81 mg chewable tablet  Chew 1 tablet (81 mg) once daily.  Qty: 30 tablet, Refills: 11    Associated Diagnoses: Chest pain, unspecified type      azaTHIOprine (Imuran) 50 mg tablet Take 0.5 tablets (25 mg) by mouth once daily.  Qty: 30 tablet, Refills: 3    Associated Diagnoses: Kidney replaced by transplant; Immunosuppression (CMS/[facility])      bumetanide (Bumex) 2 mg tablet Take 1 tablet (2 mg) by mouth 3 times a day.  Qty: 90 tablet, Refills: 0    Associated Diagnoses: Hypervolemia, unspecified hypervolemia type; ESRD (end stage renal disease) (CMS/[facility]); Secondary hypertension      calcitriol (Rocaltrol) 0.25 mcg capsule Take 1 capsule (0.25 mcg) by mouth once daily. Do not start before December 22, 2023.  Qty: 30 capsule, Refills: 2    Associated Diagnoses: Vitamin D deficiency      carvedilol (Coreg) 12.5 mg tablet TAKE THREE (3) TABLETS BY MOUTH TWICE DAILY  Qty: 180 tablet, Refills: 10    Associated Diagnoses: Kidney transplanted      chlorhexidine (Hibiclens) 4 % external liquid Apply topically once daily as needed for wound care.  Qty: 236 mL, Refills: 0    Associated Diagnoses: ESRD (end stage renal disease) (CMS/[facility])      cholecalciferol (Vitamin D-3) 25 MCG (1000 UT) tablet Take 1 tablet (1,000 Units) by mouth once daily. Do not start before December 22, 2023.  Qty: 30 tablet, Refills: 2    Associated Diagnoses: Vitamin D deficiency      cinacalcet (Sensipar) 30 mg tablet Take 1 tablet (30 mg) by mouth once daily.  Qty: 30 tablet, Refills: 11    Associated Diagnoses: Kidney replaced by transplant      docusate sodium (Colace) 100 mg capsule Take 1 capsule (100 mg) by mouth once daily as needed.      Easy Touch Alcohol Prep Pads pads, medicated       hydrALAZINE (Apresoline) 100 mg tablet Take 1 tablet (100 mg) by mouth every 8 hours.  Qty: 90 tablet, Refills: 0    Comments: Meds 2 beds 3/16 [facility] 2059  Associated Diagnoses: Hypertension secondary to other renal disorders      insulin glargine (Lantus Solostar U-100 Insulin) 100  "unit/mL (3 mL) pen Inject 20 Units under the skin once daily in the morning. Take as directed per insulin instructions.  Qty: 15 mL, Refills: 3    Associated Diagnoses: Diabetes mellitus type 2 without retinopathy (CMS/HCC)      insulin lispro (HumaLOG) 100 unit/mL injection Inject under the skin 3 times a day with meals. 100-199 2 units 200-299 4 units 300-399 6 units      isosorbide mononitrate ER (Imdur) 60 mg 24 hr tablet Take 1 tablet (60 mg) by mouth once daily. Do not crush or chew. Do not start before January 22, 2024.  Qty: 30 tablet, Refills: 1    Associated Diagnoses: Kidney transplanted      !! lancets (Unilet Super Thin Lancets) 30 gauge misc USE TO TEST BLOOD SUGAR THREE TIMES A DAY  Qty: 300 each, Refills: 0    Associated Diagnoses: Type 1 diabetes mellitus without complication (CMS/HCC)      multivitamin tablet Take 1 tablet by mouth once daily.      NIFEdipine ER (Adalat CC) 60 mg 24 hr tablet Take 1 tablet (60 mg) by mouth 2 times a day. Do not crush, chew, or split.  Qty: 60 tablet, Refills: 1    Comments: Meds to beds  Associated Diagnoses: Secondary hypertension      ondansetron (Zofran) 4 mg tablet Take 1 tablet (4 mg) by mouth every 8 hours if needed for nausea or vomiting.      pantoprazole (ProtoNix) 40 mg EC tablet Take 1 tablet (40 mg) by mouth once daily in the morning. Take before meals. Do not crush, chew, or split. Do not start before January 22, 2024.  Qty: 30 tablet, Refills: 1    Associated Diagnoses: Gastroesophageal reflux disease without esophagitis      pen needle, diabetic (TechLITE Pen Needle) 32 gauge x 5/32\" needle 1 each 4 times a day.  Qty: 400 each, Refills: 3    Associated Diagnoses: Type 2 diabetes mellitus without complication, with long-term current use of insulin (CMS/[facility])      permethrin (Elimite) 5 % cream Apply topically once daily. Use on scalp once. Leave on overnight, wash off in the morning. Repeat 1 week later.  Qty: 60 g, Refills: 0    Associated Diagnoses: " Dermatitis      pravastatin (Pravachol) 10 mg tablet Take 1 tablet (10 mg) by mouth once daily at bedtime.  Qty: 30 tablet, Refills: 1    Associated Diagnoses: Dyslipidemia      predniSONE (Deltasone) 5 mg tablet Take 1 tablet (5 mg) by mouth once daily.      sodium zirconium cyclosilicate (Lokelma) 5 gram packet Take 5 g by mouth once daily.  Qty: 30 packet, Refills: 11    Associated Diagnoses: Hyperkalemia; Kidney replaced by transplant      sulfamethoxazole-trimethoprim (Bactrim) 400-80 mg tablet Take 1 tablet by mouth 2 times a day.  Qty: 60 tablet, Refills: 5    Associated Diagnoses: Immunosuppression (CMS/[facility])      tacrolimus (Prograf) 0.5 mg capsule Take 1.5 mg by mouth once daily in the morning AND 2 mg once daily at bedtime.  Qty: 210 capsule, Refills: 2    Associated Diagnoses: Kidney transplanted      tacrolimus (Protopic) 0.1 % ointment Apply topically 2 times a day.  Qty: 30 g, Refills: 0    Associated Diagnoses: Warts, genital      !! Unilet Super Thin Lancets 30 gauge misc 1 each 3 times a day.  Qty: 100 each, Refills: 3    Associated Diagnoses: Type 2 diabetes mellitus without complication, with long-term current use of insulin (CMS/[facility])       !! - Potential duplicate medications found. Please discuss with provider.        No Known Allergies     Physical Exam     Physical Exam  GENERAL APPEARANCE: Awake and alert. Cooperative.   HEENT: Normocephalic. Atraumatic. Sclera anicteric. Tolerates saliva. No trismus.   NECK: Supple. Trachea midline.   CARDIO: Normal rate. Radial pulses symmetrical and palpable  LUNGS: Respirations unlabored. CTAB.  ABDOMEN: Soft. Non-distended. Non-tender throughout.  SKIN: Warm and dry.   NEUROLOGICAL: Mental status: A/Ox3  NIH 0  CN II-XII tested and intact.  Sensation intact to sharp/dull differentiation in all extremities.  Motor: Normal tone and bulk. No abnormal movements appreciated. No pronator drift. Strength tested and 5/5 in bilateral wrist flexion/extension,  elbow flexion/extension, shoulder abduction, straight leg raise, knee flexion/extension, ankle dorsiflexion/plantarflexion.   Coordination: Finger to nose and heel to shin testing intact bilaterally.        Diagnostics   Labs:  Labs Reviewed   CBC WITH AUTO DIFFERENTIAL - Abnormal       Result Value    WBC 4.6      nRBC 0.0      RBC 2.54 (*)     Hemoglobin 7.6 (*)     Hematocrit 22.4 (*)     MCV 88      MCH 29.9      MCHC 33.9      RDW 13.7      Platelets 145 (*)     Neutrophils % 67.6      Immature Granulocytes %, Automated 2.6 (*)     Lymphocytes % 15.1      Monocytes % 11.6      Eosinophils % 2.4      Basophils % 0.7      Neutrophils Absolute 3.09      Immature Granulocytes Absolute, Automated 0.12      Lymphocytes Absolute 0.69 (*)     Monocytes Absolute 0.53      Eosinophils Absolute 0.11      Basophils Absolute 0.03     COMPREHENSIVE METABOLIC PANEL - Abnormal    Glucose 70 (*)     Sodium 139      Potassium 5.0      Chloride 105      Bicarbonate 32      Anion Gap 7 (*)     Urea Nitrogen 61 (*)     Creatinine 6.80 (*)     eGFR 8 (*)     Calcium 9.9      Albumin 3.3 (*)     Alkaline Phosphatase 46      Total Protein 6.1 (*)     AST 28      Bilirubin, Total 0.3      ALT 26     CBC WITH AUTO DIFFERENTIAL - Abnormal    WBC 3.1 (*)     nRBC 0.0      RBC 2.39 (*)     Hemoglobin 6.9 (*)     Hematocrit 21.2 (*)     MCV 89      MCH 28.9      MCHC 32.5      RDW 13.5      Platelets 88 (*)     Neutrophils % 59.1      Immature Granulocytes %, Automated 2.3 (*)     Lymphocytes % 22.5      Monocytes % 13.1      Eosinophils % 2.3      Basophils % 0.7      Neutrophils Absolute 1.81      Immature Granulocytes Absolute, Automated 0.07      Lymphocytes Absolute 0.69 (*)     Monocytes Absolute 0.40      Eosinophils Absolute 0.07      Basophils Absolute 0.02     RENAL FUNCTION PANEL - Abnormal    Glucose 45 (*)     Sodium 141      Potassium 5.2      Chloride 106      Bicarbonate 28      Anion Gap 12      Urea Nitrogen 62 (*)      Creatinine 6.20 (*)     eGFR 9 (*)     Calcium 9.6      Phosphorus 3.7      Albumin 3.0 (*)    URINALYSIS WITH REFLEX MICROSCOPIC - Abnormal    Color, Urine Light-Yellow      Appearance, Urine Clear      Specific Gravity, Urine 1.009      pH, Urine 5.5      Protein, Urine 100 (2+) (*)     Glucose, Urine Normal      Blood, Urine NEGATIVE      Ketones, Urine NEGATIVE      Bilirubin, Urine NEGATIVE      Urobilinogen, Urine Normal      Nitrite, Urine NEGATIVE      Leukocyte Esterase, Urine NEGATIVE     B-TYPE NATRIURETIC PEPTIDE - Abnormal     (*)     Narrative:        <100 pg/mL - Heart failure unlikely  100-299 pg/mL - Intermediate probability of acute heart                  failure exacerbation. Correlate with clinical                  context and patient history.    >=300 pg/mL - Heart Failure likely. Correlate with clinical                  context and patient history.     Biotin interference may cause falsely decreased results. Patients taking a Biotin dose of up to 5 mg/day should refrain from taking Biotin for 24 hours before sample  collection. Providers may contact their local laboratory for further information.   CBC - Abnormal    WBC 2.7 (*)     nRBC 0.0      RBC 2.30 (*)     Hemoglobin 6.9 (*)     Hematocrit 20.9 (*)     MCV 91      MCH 30.0      MCHC 33.0      RDW 13.5      Platelets 101 (*)    CBC WITH AUTO DIFFERENTIAL - Abnormal    WBC 2.8 (*)     nRBC 0.0      RBC 2.31 (*)     Hemoglobin 6.9 (*)     Hematocrit 20.8 (*)     MCV 90      MCH 29.9      MCHC 33.2      RDW 13.7      Platelets 75 (*)     Neutrophils % 72.2      Immature Granulocytes %, Automated 1.8 (*)     Lymphocytes % 14.1      Monocytes % 9.0      Eosinophils % 2.2      Basophils % 0.7      Neutrophils Absolute 2.00      Immature Granulocytes Absolute, Automated 0.05      Lymphocytes Absolute 0.39 (*)     Monocytes Absolute 0.25      Eosinophils Absolute 0.06      Basophils Absolute 0.02     POCT GLUCOSE - Abnormal    POCT Glucose  44 (*)    POCT GLUCOSE - Abnormal    POCT Glucose 107 (*)    POCT GLUCOSE - Abnormal    POCT Glucose 160 (*)    SERIAL TROPONIN-INITIAL - Normal    Troponin I, High Sensitivity 38      Narrative:     Less than 99th percentile of normal range cutoff-  Female and children under 18 years old <35 ng/L; Male <54 ng/L: Negative  Repeat testing should be performed if clinically indicated.     Female and children under 18 years old  ng/L; Male  ng/L:  Consistent with possible cardiac damage and possible increased clinical   risk. Serial measurements may help to assess extent of myocardial damage.     >120 ng/L: Consistent with cardiac damage, increased clinical risk and  myocardial infarction. Serial measurements may help assess extent of   myocardial damage.      NOTE: Children less than 1 year old may have higher baseline troponin   levels and results should be interpreted in conjunction with the overall   clinical context.    NOTE: Troponin I testing is performed using a different   testing methodology at [facility] than at other   [facility]. Direct result comparisons should only   be made within the same method.     SERIAL TROPONIN, 1 HOUR - Normal    Troponin I, High Sensitivity 35      Narrative:     Less than 99th percentile of normal range cutoff-  Female and children under 18 years old <35 ng/L; Male <54 ng/L: Negative  Repeat testing should be performed if clinically indicated.     Female and children under 18 years old  ng/L; Male  ng/L:  Consistent with possible cardiac damage and possible increased clinical   risk. Serial measurements may help to assess extent of myocardial damage.     >120 ng/L: Consistent with cardiac damage, increased clinical risk and  myocardial infarction. Serial measurements may help assess extent of   myocardial damage.      NOTE: Children less than 1 year old may have higher baseline troponin   levels and results should be interpreted in  conjunction with the overall   clinical context.    NOTE: Troponin I testing is performed using a different   testing methodology at [facility] than at other   [facility]. Direct result comparisons should only   be made within the same method.     SARS-COV-2 AND INFLUENZA A/B PCR - Normal    Flu A Result Not Detected      Flu B Result Not Detected      Coronavirus 2019, PCR Not Detected      Narrative:     This assay has received FDA Emergency Use Authorization (EUA) and  is only authorized for the duration of time that circumstances exist to justify the authorization of the emergency use of in vitro diagnostic tests for the detection of SARS-CoV-2 virus and/or diagnosis of COVID-19 infection under section 564(b)(1) of the Act, 21 U.S.C. 360bbb-3(b)(1). Testing for SARS-CoV-2 is only recommended for patients who meet current clinical and/or epidemiological criteria as defined by federal, state, or local public health directives. This assay is an in vitro diagnostic nucleic acid amplification test for the qualitative detection of SARS-CoV-2, Influenza A, and Influenza B from nasopharyngeal specimens and has been validated for use at [facility]. Negative results do not preclude COVID-19 infections or Influenza A/B infections, and should not be used as the sole basis for diagnosis, treatment, or other management decisions. If Influenza A/B and RSV PCR results are negative, testing for Parainfluenza virus, Adenovirus and Metapneumovirus is routinely performed for [facility] pediatric oncology and intensive care inpatients, and is available on other patients by placing an add-on request.    MAGNESIUM - Normal    Magnesium 1.88     TACROLIMUS - Normal    Tacrolimus  4.3      Narrative:     NOTE: Result was obtained using a  chemiluminescent microparticle immunoassay  (CMIA) on the  i system.  Optimal therapeutic ranges for immunosuppressant  drugs depend upon an  individual  patient's current clinical state, type of  organ transplant, time post-transplant,  co-administration of other immunosuppressants,  and other clinical factors. The results of  this test should be correlated with additional  clinical and laboratory data before changes  in treatment regimens are made.   POCT GLUCOSE - Normal    POCT Glucose 90     POCT GLUCOSE - Normal    POCT Glucose 99     TROPONIN SERIES- (INITIAL, 1 HR)    Narrative:     The following orders were created for panel order Troponin I Series, High Sensitivity (0, 1 HR).  Procedure                               Abnormality         Status                     ---------                               -----------         ------                     Troponin I, High Sensiti...[[id number]]  Normal              Final result               Troponin, High Sensitivi...[[id number]]  Normal              Final result                 Please view results for these tests on the individual orders.   URINE GRAY TUBE    Extra Tube Hold for add-ons.     MICROSCOPIC ONLY, URINE    WBC, Urine NONE      RBC, Urine NONE      Squamous Epithelial Cells, Urine 1-9 (SPARSE)      Mucus, Urine FEW     TYPE AND SCREEN    ABO TYPE O      Rh TYPE POS      ANTIBODY SCREEN NEG     CBC WITH AUTO DIFFERENTIAL   RENAL FUNCTION PANEL   MAGNESIUM   CBC   PREPARE RBC    PRODUCT CODE X0275I32      Unit Number [id number]      Unit ABO O      Unit RH POS      XM INTEP COMP      Dispense Status XM      Blood Expiration Date April 19, 2024 23:59 EDT      PRODUCT BLOOD TYPE 5100      UNIT VOLUME 350     PREPARE RBC    PRODUCT CODE Y5951V02      Unit Number [id number]      Unit ABO O      Unit RH POS      XM INTEP COMP      Dispense Status IS      Blood Expiration Date April 19, 2024 23:59 EDT      PRODUCT BLOOD TYPE 5100      UNIT VOLUME 276     POCT GLUCOSE METER   POCT GLUCOSE METER   POCT GLUCOSE METER   POCT GLUCOSE METER   POCT GLUCOSE METER     Radiographs:  CT chest wo IV  contrast   Final Result   There is cardiomegaly with moderately large right and moderate left   pleural effusions.  Dense alveolar consolidation with air bronchograms   is seen in the posterior right lower lobe and to a lesser degree in   the posterior left lung base.  Patchy alveolar infiltrate is also seen   in the anterior left upper lobe.  There also is a question of mild   interstitial pulmonary edema.  There is no evidence of pneumothorax..   Signed by Jerry Goss MD      XR chest 1 view   Final Result   Improved airspace opacities with residual opacity in the right lower   lobe and small right pleural effusion.  Cardiomegaly and mild   prominence of the pulmonary vasculature.  Findings suggest component   of fluid overload.  Please correlate for pneumonia.   Signed by Chapo Carroll, DO      Point of Care Ultrasound   Final Result      Transthoracic Echo (TTE) Complete    (Results Pending)   Vascular US upper extremity hemodialysis access duplex left    (Results Pending)         EMERGENCY DEPARTMENT COURSE and DIFFERENTIAL DIAGNOSIS/MDM/PLAN:   Manolo Bashir is a 68 y.o. male who presented to the emergency department for dizziness and hypotension.  EMS said lowest blood pressure for them was 100/50.  NIH 0.  Differential diagnosis included arrhythmia, CVA, TIA, medication side effect. Our workup consisted of ordering/reviewing CBC, CMP, troponin, EKG, chest x-ray.  BP on arrival was 106/55.    Patient presented with dizziness and hypotension.  Disposition pending workup and reassessment.  Patient signed out to incoming provider.    Final Diagnosis:   1. Acute hypoxic respiratory failure (CMS/HCC)    2. Hypotension due to medication    3. Other hypervolemia    4. Kidney transplanted    5. Hypervolemia, unspecified hypervolemia type    6. Hypertension secondary to other renal disorders    7. ESRD (end stage renal disease) (CMS/HCC)    8. Hypoglycemia    9. Anemia due to chronic kidney disease,  unspecified CKD stage        ED Course as of 04/04/24 2020 Wed Apr 03, 2024   1522 Pt on 3L at this time. No oxygen requirement at baseline [JG]   1658 POC glucose 78 - not showing up in chart   [KR]      ED Course User Index  [JG] Leobardo Harrington PA-C  [KR] Wilma Vidal MD         Diagnoses as of 04/04/24 2020   Acute hypoxic respiratory failure (CMS/HCC)   Hypotension due to medication   Other hypervolemia   Hypoglycemia   Anemia due to chronic kidney disease, unspecified CKD stage       CONSULTS:  IP CONSULT TO NEPHROLOGY TRANSPLANT  IP CONSULT TO VASCULAR SURGERY  IP CONSULT TO TRANSPLANT SERVICES    PROCEDURES:  Unless otherwise noted below, none     Procedures      PATIENT REFERRED TO:  No follow-up provider specified.    DISCHARGE MEDICATIONS:  Current Discharge Medication List        @[facility]([id number]:[email or web address])@    (Please note:  Portions of this note were completed with a voice recognition program.  Efforts were made to edit the dictations but occasionally words and phrases are mis-transcribed.)  Form [id number]            Leobardo Harrington PA-C  04/04/24 2021

## 2024-04-03 NOTE — ED TRIAGE NOTES
Patient BIB [facility] for dizziness. Per EMS upon there arrival pt systolic bp in the 80s-90s. Patient A&Ox4, respirations even and unlabored. Denies syncope, nausea, vomiting, chest pain, SOB, or diarrhea.

## 2024-04-03 NOTE — PROGRESS NOTES
Patient was handed off to me from the previous team. For full history, physical, and prior ED course, please see previous provider note prior to patient handoff. This is an addendum to the record.      Briefly, this is a 68-year-old male with history of ESRD status post renal transplant, being worked up for rejection and will be going on dialysis again soon, resenting to the ED with lightheadedness and hypotension in the setting of new BP med adjustment.  Patient normotensive for the ED provider on previous shift.  Patient once noted to have a new oxygen requirement.  Chest x-ray shows concern for fluid overload.  Echo shows normal EF with no pericardial effusion.  Given patient's new O2 requirement and unclear specific etiology based on chest x-ray, CT of the chest will be performed.   CT showing concern for pulmonary edema and fluid overload.  Was given IV diuresis given he still makes urine. Pt was also hypoglycemic, drank OJ and improved. Will be admitted for acute hypoxic respiratory failure in the setting of pulmonary edema.      Disposition:  As a result of their workup, the patient will require admission to the hospital.  The patient was informed of their diagnosis.  Patient was given the opportunity to ask questions and answered them.  Patient agreed to be admitted to the hospital.    Patient discussed with attending physician.     Wilma Vidal MD PGY3  Emergency Medicine

## 2024-04-03 NOTE — H&P
"History Of Present Illness  Manolo Bashir is a 68 y.o. male with ESRD formerly on HD then s/p 2x renal transplants (1992, 2013, currently on prednisone, tacrolimus, azathioprine, last baseline Cr ~5.5), pancytopenia, angina (last EF 60-65% 11/23), IDDM2 (last A1c 7.7 % 1/24), HTN, prostate cancer s/p radical prostatectomy, HCV (treated and RNA not detected last 10/18/23) presenting with dizziness. Patient states that for the past week he has felt dizzy when sitting or standing. He reports his blood pressure has been very low at these times, SBP between , and DBP as low as 30s. Patient states he takes all his medications as prescribed. He takes his BP three times per day and has noticed that it is very high in the mornings with SBP in the 200s at times, and then his following two readings are low. For the past week, he has had episodes of this dizziness where his vision gets blurry and he will feel like he will pass out. This morning, he took his pills at 9 am and then had an episode where he got very dizzy and almost fainted around 11 am, after which his home care nurse called EMS.     Patient denies sick contacts or feeling fevers, chills, or cough. State his SOB is at baseline, he does not need O2 at home but endorses mild SOB at rest that worsens on exertion. No leg swelling or scrotal swelling. He endorses overall low appetite and has not been eating or drinking well, denies diarrhea. Has been urinating frequenty: \"10x last night\" of small amounts. Also endorses nausea in the mornings with 2-3 episodes of non-bloody emesis and headaches he attributes to his high AM BP. States he has chronic back pain that has been worked up extensively.    After discussion with home nurse, she had been giving patient 150 mg of hydralazine TID, as she thought this is what was written on his previous discharge instructions. In terms of his high blood pressure in the mornings, this is because he would not take his evening BP " meds if his BP was low at the time, leading to high AM readings.     Of note, patient has been admitted several times over the past few months with HTN urgency, ISIAH, anasarca, oliguria, and abdominal pain. He was recently admitted here from 3/11 - 3/16 for SOB and hypervolemia. He recently underwent dialysis access creation on 3/21 at [facility] as well given LUE forearm and RUE forearm AVF occlusions shown on duplex US 2/28/24 and 3/15/24.     In the ED:  VS: /55, 92% on RA, HR 64, T 99, RR16  On 1.5L NC at 97% on my evaluation.    CBC: WBC 4.6, Hgb 7.6, Plt 145  CMP: glucose 70, Na 139, K 5.0, Cr 6.8, BUN 61, AST 28, ALT 26, bili 0.3  Top 35  COVID/Flu negative  CXR: improved airspace opacities with residual opacity in RLL and small right pleural effusion, cardiomegaly and mild prominence of pulm vasculature, suggestive of fluid overload, correlate for pneumonia  CT chest: cardiomegaly with moderately large right and moderate left pleural effusions, dense alveolar consolidation in the posterior right lower lobe and posterior left lung base, patchy alveolar infiltrate in the anterior left upper lobe    Interventions: IV Bumex 2 mg 1x    Past Medical History  Past Medical History:   Diagnosis Date    Anemia     Arthritis     Cataract     Chronic kidney disease, stage 3 unspecified (CMS/HCC) 09/26/2018    Stage 3 chronic kidney disease    CKD (chronic kidney disease)     stage V    COVID-19 06/18/2020    COVID-19 virus infection    Diabetes (CMS/HCC)     ESRD (end stage renal disease) (CMS/[facility])     Focal and segmental proliferative glomerulonephritis 12/23/2023    HTN (hypertension)     Hyperlipidemia     Other long term (current) drug therapy 07/20/2021    High risk medication use    Personal history of other diseases of the circulatory system     Personal history of cardiac murmur    Personal history of other infectious and parasitic diseases 08/17/2015    History of hepatitis    Polyp, colonic 08/17/2023     Primary osteoarthritis of both ankles 08/17/2023    Prostate cancer (CMS/HCC)     Tubular adenoma of colon 08/17/2023    Unspecified kidney failure 08/17/2016    Renal failure       Surgical History  Past Surgical History:   Procedure Laterality Date    ILEOSTOMY  04/25/2017    Ileostomy    ILEOSTOMY CLOSURE  08/17/2015    Ileostomy Closure    OTHER SURGICAL HISTORY  04/21/2017    Right Hemicolectomy    OTHER SURGICAL HISTORY  08/17/2015    Arteriovenous Surgery Creation Of A-V Fistula    OTHER SURGICAL HISTORY  08/17/2015    Sigmoidoscopy (Fiberoptic, Therapeutic )    PROSTATECTOMY  10/11/2013    Prostatectomy Radical    TRANSPLANT, KIDNEY, OPEN  1992    TRANSPLANT, KIDNEY, OPEN  2013    US GUIDED PERCUTANEOUS BIOPSY RENAL LEFT Left 11/20/2023    US GUIDED PERCUTANEOUS BIOPSY RENAL LEFT 11/20/2023 Lou Rodgers MD [facility]    US GUIDED PERCUTANEOUS PERITONEAL OR RETROPERITONEAL FLUID COLLECTION DRAINAGE  10/20/2022    US GUIDED PERCUTANEOUS PERITONEAL OR RETROPERITONEAL FLUID COLLECTION DRAINAGE 10/20/2022 [facility]        Social History  He reports that he has never smoked. He has been exposed to tobacco smoke. He has never used smokeless tobacco. He reports current alcohol use. He reports current drug use. Drug: Oxycodone.    Social alcohol use.    Family History  Family History   Problem Relation Name Age of Onset    Bone cancer Mother      Other (corona's sarcome of the bone marrow) Mother      Prostate cancer Father      Diabetes Other Family Hist     Hypertension Other Family Hist         Allergies  Patient has no known allergies.    Review of Systems   Constitutional:  Negative for chills and fever.   HENT:  Positive for congestion.    Respiratory:  Positive for shortness of breath. Negative for cough.    Cardiovascular:  Negative for leg swelling.   Gastrointestinal:  Positive for abdominal pain, nausea and vomiting.   Genitourinary:  Positive for frequency. Negative for scrotal swelling.  "  Neurological:  Positive for dizziness, light-headedness and headaches.        Physical Exam  Constitutional:       General: He is not in acute distress.     Appearance: He is not toxic-appearing.   HENT:      Nose: No congestion.      Mouth/Throat:      Mouth: Mucous membranes are moist.   Eyes:      General: No scleral icterus.     Extraocular Movements: Extraocular movements intact.      Conjunctiva/sclera: Conjunctivae normal.   Cardiovascular:      Rate and Rhythm: Normal rate and regular rhythm.      Heart sounds: No murmur heard.     No gallop.   Pulmonary:      Effort: Pulmonary effort is normal. No respiratory distress.      Breath sounds: Normal breath sounds. No wheezing.   Abdominal:      General: Abdomen is flat. Bowel sounds are normal. There is no distension.      Palpations: Abdomen is soft.      Tenderness: There is no abdominal tenderness.   Musculoskeletal:      Right lower leg: No edema.      Left lower leg: Edema (trace) present.      Comments: LUE graft site without tenderness, swelling or redness.    Skin:     General: Skin is warm and dry.   Neurological:      General: No focal deficit present.      Mental Status: He is alert.   Psychiatric:         Mood and Affect: Mood normal.         Behavior: Behavior normal.       Last Recorded Vitals  Blood pressure 130/71, pulse 60, temperature 37.2 °C (99 °F), temperature source Oral, resp. rate 18, height 1.727 m (5' 8\"), weight 77.1 kg (170 lb), SpO2 96 %.    Relevant Results  Results for orders placed or performed during the hospital encounter of 04/03/24 (from the past 24 hour(s))   CBC and Auto Differential   Result Value Ref Range    WBC 4.6 4.4 - 11.3 x10*3/uL    nRBC 0.0 0.0 - 0.0 /100 WBCs    RBC 2.54 (L) 4.50 - 5.90 x10*6/uL    Hemoglobin 7.6 (L) 13.5 - 17.5 g/dL    Hematocrit 22.4 (L) 41.0 - 52.0 %    MCV 88 80 - 100 fL    MCH 29.9 26.0 - 34.0 pg    MCHC 33.9 32.0 - 36.0 g/dL    RDW 13.7 11.5 - 14.5 %    Platelets 145 (L) 150 - 450 " x10*3/uL    Neutrophils % 67.6 40.0 - 80.0 %    Immature Granulocytes %, Automated 2.6 (H) 0.0 - 0.9 %    Lymphocytes % 15.1 13.0 - 44.0 %    Monocytes % 11.6 2.0 - 10.0 %    Eosinophils % 2.4 0.0 - 6.0 %    Basophils % 0.7 0.0 - 2.0 %    Neutrophils Absolute 3.09 1.20 - 7.70 x10*3/uL    Immature Granulocytes Absolute, Automated 0.12 0.00 - 0.70 x10*3/uL    Lymphocytes Absolute 0.69 (L) 1.20 - 4.80 x10*3/uL    Monocytes Absolute 0.53 0.10 - 1.00 x10*3/uL    Eosinophils Absolute 0.11 0.00 - 0.70 x10*3/uL    Basophils Absolute 0.03 0.00 - 0.10 x10*3/uL   Comprehensive metabolic panel   Result Value Ref Range    Glucose 70 (L) 74 - 99 mg/dL    Sodium 139 136 - 145 mmol/L    Potassium 5.0 3.5 - 5.3 mmol/L    Chloride 105 98 - 107 mmol/L    Bicarbonate 32 21 - 32 mmol/L    Anion Gap 7 (L) 10 - 20 mmol/L    Urea Nitrogen 61 (H) 6 - 23 mg/dL    Creatinine 6.80 (H) 0.50 - 1.30 mg/dL    eGFR 8 (L) >60 mL/min/1.73m*2    Calcium 9.9 8.6 - 10.6 mg/dL    Albumin 3.3 (L) 3.4 - 5.0 g/dL    Alkaline Phosphatase 46 33 - 136 U/L    Total Protein 6.1 (L) 6.4 - 8.2 g/dL    AST 28 9 - 39 U/L    Bilirubin, Total 0.3 0.0 - 1.2 mg/dL    ALT 26 10 - 52 U/L   Troponin I, High Sensitivity, Initial   Result Value Ref Range    Troponin I, High Sensitivity 38 0 - 53 ng/L   Troponin, High Sensitivity, 1 Hour   Result Value Ref Range    Troponin I, High Sensitivity 35 0 - 53 ng/L   Sars-CoV-2 and Influenza A/B PCR   Result Value Ref Range    Flu A Result Not Detected Not Detected    Flu B Result Not Detected Not Detected    Coronavirus 2019, PCR Not Detected Not Detected        XR chest 1 view    Result Date: 4/3/2024  STUDY: Chest Radiograph;  4/3/24 at 3:08 PM INDICATION: Evaluate for pneumonia. COMPARISON: Chest XR 3/11/24. ACCESSION NUMBER(S): [id number] ORDERING CLINICIAN: DARRICK CHOI TECHNIQUE:  Frontal chest was obtained at 1507 hours. FINDINGS: CARDIOMEDIASTINAL SILHOUETTE: Heart size is enlarged and mediastinal contours appear stable.   LUNGS: There are improved airspace opacities with residual opacity right lower lobe and small right pleural effusion.  There is mild prominence of the pulmonary interstitium without pneumothorax.  ABDOMEN: No remarkable upper abdominal findings.  BONES: No acute osseous changes.  There is vascular stent in the left subclavian region.    Improved airspace opacities with residual opacity in the right lower lobe and small right pleural effusion.  Cardiomegaly and mild prominence of the pulmonary vasculature.  Findings suggest component of fluid overload.  Please correlate for pneumonia. Signed by Chapo Carroll DO    Point of Care Ultrasound    Result Date: 4/3/2024  Sigrid Espinosa MD     4/3/2024  1:51 PM Performed by: Sigrid Espinosa MD Authorized by: Sigrid Espinosa MD  Cardiac Indications: hypotension Procedure: Cardiac Ultrasound Findings:  Views: parasternal long, parasternal short and apical four The pericardial space was visualized and was NEGATIVE for a significant pericardial effusion. Activity: Ventricular contractions were visualized. LV: LV systolic function was NORMAL. RV: RV size was NORMAL. Impression: Cardiac: The focused cardiac ultrasound exam was NORMAL.       CT chest wo IV contrast    Result Date: 4/3/2024  STUDY: CT Chest without IV Contrast; 4/3/24 at 4:33 PM INDICATION: New hypoxic, pneumonia.  Right pleural effusion. COMPARISON: Chest XR same date. ACCESSION NUMBER(S): [id number] ORDERING CLINICIAN: KOURTNEY ESPAÑA TECHNIQUE:  CT of the chest was performed without contrast.  Automated mA/kV exposure control was utilized and patient examination was performed in strict accordance with principles of ALARA. FINDINGS: MEDIASTINUM: The heart is enlarged but no definite pericardial effusion is seen.. Coronary artery calcifications are not identified.  There is mildly ectatic but there does not appear to be any aneurysm. LUNGS/PLEURA: There or bilateral pleural effusions moderately large right and  small-to-moderate on the left.  There is no evidence of pneumothorax..  The airways are patent. There is volume loss and consolidation with air bronchograms in the posterior right lower lobe and some small patchy alveolar infiltrate and atelectasis in the posterior left lung base.  There also is a 1.7 cm area of patchy alveolar consolidation in the anterior subpleural left upper lobe..  The remainder the lungs do show some subtle groundglass density infiltrate which could reflect some minimal scattered pulmonary edema. LYMPH NODES: Thoracic lymph nodes are not enlarged. UPPER ABDOMEN: Upper abdomen demonstrates no acute pathology. There is a vascular stent anterior to the left clavicle. BONES: There are no acute fractures.  No suspicious bony lesions.    There is cardiomegaly with moderately large right and moderate left pleural effusions.  Dense alveolar consolidation with air bronchograms is seen in the posterior right lower lobe and to a lesser degree in the posterior left lung base.  Patchy alveolar infiltrate is also seen in the anterior left upper lobe.  There also is a question of mild interstitial pulmonary edema.  There is no evidence of pneumothorax.. Signed by Jerry Goss MD      Current Outpatient Medications   Medication Instructions    acetaminophen (TYLENOL) 975 mg, oral, Every 6 hours PRN    aspirin 81 mg, oral, Daily    azaTHIOprine (IMURAN) 25 mg, oral, Daily    bumetanide (BUMEX) 2 mg, oral, 3 times daily    calcitriol (Rocaltrol) 0.25 mcg capsule Take 1 capsule (0.25 mcg) by mouth once daily. Do not start before December 22, 2023.    carvedilol (COREG) 37.5 mg, oral, 2 times daily    chlorhexidine (Hibiclens) 4 % external liquid Topical, Daily PRN    cholecalciferol (Vitamin D-3) 25 MCG (1000 UT) tablet Take 1 tablet (1,000 Units) by mouth once daily. Do not start before December 22, 2023.    cinacalcet (SENSIPAR) 30 mg, oral, Daily    docusate sodium (Colace) 100 mg capsule 1 capsule, oral,  "Daily PRN    Easy Touch Alcohol Prep Pads pads, medicated     hydrALAZINE (APRESOLINE) 50 mg, oral, 3 times daily    hydrALAZINE (APRESOLINE) 100 mg, oral, Every 8 hours    insulin glargine (Lantus Solostar U-100 Insulin) 100 unit/mL (3 mL) pen Inject 20 Units under the skin once daily in the morning. Take as directed per insulin instructions.    insulin lispro (HumaLOG) 100 unit/mL injection subcutaneous, 3 times daily with meals, 100-199 2 units 200-299 4 units 300-399 6 units     isosorbide mononitrate ER (Imdur) 60 mg 24 hr tablet Take 1 tablet (60 mg) by mouth once daily. Do not crush or chew. Do not start before January 22, 2024.    lancets (Unilet Super Thin Lancets) 30 gauge misc 3 times daily    Lokelma 5 g, oral, Daily    multivitamin tablet 1 tablet, oral, Daily    NIFEdipine ER (ADALAT CC) 60 mg, oral, 2 times daily, Do not crush, chew, or split.    ondansetron (ZOFRAN) 4 mg, oral, Every 8 hours PRN    pantoprazole (ProtoNix) 40 mg EC tablet Take 1 tablet (40 mg) by mouth once daily in the morning. Take before meals. Do not crush, chew, or split. Do not start before January 22, 2024.    pen needle, diabetic (TechLITE Pen Needle) 32 gauge x 5/32\" needle 1 each, miscellaneous, 4 times daily    permethrin (Elimite) 5 % cream Topical, Daily, Use on scalp once. Leave on overnight, wash off in the morning. Repeat 1 week later.    pravastatin (PRAVACHOL) 10 mg, oral, Nightly    predniSONE (DELTASONE) 5 mg, oral, Daily    sulfamethoxazole-trimethoprim (Bactrim) 400-80 mg tablet 1 tablet, oral, 2 times daily    tacrolimus (Prograf) 0.5 mg capsule Take 1.5 mg by mouth once daily in the morning AND 2 mg once daily at bedtime.    tacrolimus (Protopic) 0.1 % ointment Topical, 2 times daily    Unilet Super Thin Lancets 30 gauge misc 1 each, miscellaneous, 3 times daily         Assessment/Plan   Principal Problem:    Acute hypoxic respiratory failure (CMS/HCC)    Mr. Bashir is a 68M with ESRD formerly on HD then s/p 2x " renal transplants (1992, 2013, currently on prednisone, tacrolimus, azathioprine, last baseline Cr ~5.5), pancytopenia, angina (last EF 60-65% 11/23), IDDM2 (last A1c 7.7 % 1/24), HTN, prostate cancer s/p radical prostatectomy, HCV (treated and RNA not detected last 10/18/23) presenting with dizziness. Patient has been experiencing low blood pressure at home secondary to medication mismanagement, likely contributing to orthostatic hypotension and symptoms of dizziness. Vitals on admission show hypotension to 106/55, initially with O2 sat at 92% on RA, placed on 2L NC. Labs unremarkable on admission except for known pancytopenia and know ESRD. CXR and CT with cardiomegaly and b/l pleural effusions. PE unremarkable. Low suspicion for infectious process given lack of symptoms, no fever on vitals, and no leukocytosis on labs. Plan to continue patient on intended dose of hydralazine 100 mg TID as well as other home BP meds and monitor blood pressure.     #Dizziness  #Episodic hypotension  #HTN  :: TTE 11/18 showed EF 60 to 65%, left ventricular cavity moderately dilated, severe concentric left ventricular hypertrophy, mild to moderate aortic valve regurgitation  - Continue home Hydralazine from 100 mg TID  - Continue home Amlodipine 10 mg daily  - Continue home Carvedilol 37.5mg BID   - Continue home Imdur ER 60 mg daily  - Continue home Nifedipine ER 60 mg BID  - Continue home bumex 2 mg TID   - Monitor pulse ox  - Check orthostats  - Check BNP  - Strict I's and O's     #ESRD s/p kidney transplant  :: s/p dialysis access creation on 3/21 at [facility]   - Continue home Tacrolimus 1.5mg AM and 2mg PM & tacro ointment  - Check AM tacro level   - Check UA given increase frequency (possibly chronic per previous notes)  - Consider nephro consult in the AM to assess graft/dialysis needs  - Continue home Prednisone 5mg daily  - Continue home Imuran (Azathioprine) 25mg daily  - Continue home Bactrim 400-80 MWF  - Continue Lokelma  5g daily  - Continue home cinacalcet 30 mg daily  - Continue vitamin D3/calcitriol/MV     #Hyperlipidemia  - Continue home Pravastatin 10 mg nightly     #CAD  - Continue home ASA 81 mg     #DM  :: home glargine 20U with SSI  - Continue glargine 15U  - Mild SSI     #Back pain  ::MRI 09/19/2023 showing nonspecific focus of abnormal signal in R pedicle of L4 c/w osseous hemangioma, stress fracture, or osteoid osteoma. No discitis or osteomyelitis. No evidence of metastasis.  -Tylenol 975 mg q6h PRN  - Lidocaine patches    #Lice  - Continue permethrin cream to scalp daily    F: PRN  E: PRN  N: Regular  A: PIV  DVT: heparin subcutaneous  GI: pantoprazole, colace and miralax     Full Code  NOK: [facility] home health nurse, [phone]     Rosita Loera MD  Neurology PGY-1

## 2024-04-03 NOTE — TELEPHONE ENCOUNTER
Called the patient twice and left a voicemail. Also had  Radha call the patient.   Spoke with the patient. He said he called 911. Paramedics are with him now. Paramedic stated that current blood pressure is 100/50. He is going to be transferred to the hospital. Will update on call doctor-Dr. Arroyo.

## 2024-04-04 ENCOUNTER — APPOINTMENT (OUTPATIENT)
Dept: SURGERY | Facility: CLINIC | Age: 69
End: 2024-04-04
Payer: COMMERCIAL

## 2024-04-04 PROBLEM — J96.01 ACUTE HYPOXIC RESPIRATORY FAILURE (MULTI): Status: RESOLVED | Noted: 2024-04-03 | Resolved: 2024-04-04

## 2024-04-04 LAB
ABO GROUP (TYPE) IN BLOOD: NORMAL
ALBUMIN SERPL BCP-MCNC: 3 G/DL (ref 3.4–5)
ANION GAP SERPL CALC-SCNC: 12 MMOL/L (ref 10–20)
ANTIBODY SCREEN: NORMAL
BASOPHILS # BLD AUTO: 0.02 X10*3/UL (ref 0–0.1)
BASOPHILS # BLD AUTO: 0.02 X10*3/UL (ref 0–0.1)
BASOPHILS NFR BLD AUTO: 0.7 %
BASOPHILS NFR BLD AUTO: 0.7 %
BUN SERPL-MCNC: 62 MG/DL (ref 6–23)
CALCIUM SERPL-MCNC: 9.6 MG/DL (ref 8.6–10.6)
CHLORIDE SERPL-SCNC: 106 MMOL/L (ref 98–107)
CO2 SERPL-SCNC: 28 MMOL/L (ref 21–32)
CREAT SERPL-MCNC: 6.2 MG/DL (ref 0.5–1.3)
EGFRCR SERPLBLD CKD-EPI 2021: 9 ML/MIN/1.73M*2
EOSINOPHIL # BLD AUTO: 0.06 X10*3/UL (ref 0–0.7)
EOSINOPHIL # BLD AUTO: 0.07 X10*3/UL (ref 0–0.7)
EOSINOPHIL NFR BLD AUTO: 2.2 %
EOSINOPHIL NFR BLD AUTO: 2.3 %
ERYTHROCYTE [DISTWIDTH] IN BLOOD BY AUTOMATED COUNT: 13.5 % (ref 11.5–14.5)
ERYTHROCYTE [DISTWIDTH] IN BLOOD BY AUTOMATED COUNT: 13.5 % (ref 11.5–14.5)
ERYTHROCYTE [DISTWIDTH] IN BLOOD BY AUTOMATED COUNT: 13.7 % (ref 11.5–14.5)
GLUCOSE BLD MANUAL STRIP-MCNC: 107 MG/DL (ref 74–99)
GLUCOSE BLD MANUAL STRIP-MCNC: 151 MG/DL (ref 74–99)
GLUCOSE BLD MANUAL STRIP-MCNC: 160 MG/DL (ref 74–99)
GLUCOSE BLD MANUAL STRIP-MCNC: 44 MG/DL (ref 74–99)
GLUCOSE BLD MANUAL STRIP-MCNC: 90 MG/DL (ref 74–99)
GLUCOSE BLD MANUAL STRIP-MCNC: 99 MG/DL (ref 74–99)
GLUCOSE SERPL-MCNC: 45 MG/DL (ref 74–99)
HCT VFR BLD AUTO: 20.8 % (ref 41–52)
HCT VFR BLD AUTO: 20.9 % (ref 41–52)
HCT VFR BLD AUTO: 21.2 % (ref 41–52)
HGB BLD-MCNC: 6.9 G/DL (ref 13.5–17.5)
HOLD SPECIMEN: NORMAL
IMM GRANULOCYTES # BLD AUTO: 0.05 X10*3/UL (ref 0–0.7)
IMM GRANULOCYTES # BLD AUTO: 0.07 X10*3/UL (ref 0–0.7)
IMM GRANULOCYTES NFR BLD AUTO: 1.8 % (ref 0–0.9)
IMM GRANULOCYTES NFR BLD AUTO: 2.3 % (ref 0–0.9)
LYMPHOCYTES # BLD AUTO: 0.39 X10*3/UL (ref 1.2–4.8)
LYMPHOCYTES # BLD AUTO: 0.69 X10*3/UL (ref 1.2–4.8)
LYMPHOCYTES NFR BLD AUTO: 14.1 %
LYMPHOCYTES NFR BLD AUTO: 22.5 %
MAGNESIUM SERPL-MCNC: 1.88 MG/DL (ref 1.6–2.4)
MCH RBC QN AUTO: 28.9 PG (ref 26–34)
MCH RBC QN AUTO: 29.9 PG (ref 26–34)
MCH RBC QN AUTO: 30 PG (ref 26–34)
MCHC RBC AUTO-ENTMCNC: 32.5 G/DL (ref 32–36)
MCHC RBC AUTO-ENTMCNC: 33 G/DL (ref 32–36)
MCHC RBC AUTO-ENTMCNC: 33.2 G/DL (ref 32–36)
MCV RBC AUTO: 89 FL (ref 80–100)
MCV RBC AUTO: 90 FL (ref 80–100)
MCV RBC AUTO: 91 FL (ref 80–100)
MONOCYTES # BLD AUTO: 0.25 X10*3/UL (ref 0.1–1)
MONOCYTES # BLD AUTO: 0.4 X10*3/UL (ref 0.1–1)
MONOCYTES NFR BLD AUTO: 13.1 %
MONOCYTES NFR BLD AUTO: 9 %
NEUTROPHILS # BLD AUTO: 1.81 X10*3/UL (ref 1.2–7.7)
NEUTROPHILS # BLD AUTO: 2 X10*3/UL (ref 1.2–7.7)
NEUTROPHILS NFR BLD AUTO: 59.1 %
NEUTROPHILS NFR BLD AUTO: 72.2 %
NRBC BLD-RTO: 0 /100 WBCS (ref 0–0)
PHOSPHATE SERPL-MCNC: 3.7 MG/DL (ref 2.5–4.9)
PLATELET # BLD AUTO: 101 X10*3/UL (ref 150–450)
PLATELET # BLD AUTO: 75 X10*3/UL (ref 150–450)
PLATELET # BLD AUTO: 88 X10*3/UL (ref 150–450)
POTASSIUM SERPL-SCNC: 5.2 MMOL/L (ref 3.5–5.3)
RBC # BLD AUTO: 2.3 X10*6/UL (ref 4.5–5.9)
RBC # BLD AUTO: 2.31 X10*6/UL (ref 4.5–5.9)
RBC # BLD AUTO: 2.39 X10*6/UL (ref 4.5–5.9)
RH FACTOR (ANTIGEN D): NORMAL
SODIUM SERPL-SCNC: 141 MMOL/L (ref 136–145)
TACROLIMUS BLD-MCNC: 4.3 NG/ML
WBC # BLD AUTO: 2.7 X10*3/UL (ref 4.4–11.3)
WBC # BLD AUTO: 2.8 X10*3/UL (ref 4.4–11.3)
WBC # BLD AUTO: 3.1 X10*3/UL (ref 4.4–11.3)

## 2024-04-04 PROCEDURE — 80197 ASSAY OF TACROLIMUS: CPT

## 2024-04-04 PROCEDURE — 85025 COMPLETE CBC W/AUTO DIFF WBC: CPT

## 2024-04-04 PROCEDURE — 86920 COMPATIBILITY TEST SPIN: CPT

## 2024-04-04 PROCEDURE — 1100000001 HC PRIVATE ROOM DAILY

## 2024-04-04 PROCEDURE — 99223 1ST HOSP IP/OBS HIGH 75: CPT

## 2024-04-04 PROCEDURE — 2500000005 HC RX 250 GENERAL PHARMACY W/O HCPCS

## 2024-04-04 PROCEDURE — 36415 COLL VENOUS BLD VENIPUNCTURE: CPT

## 2024-04-04 PROCEDURE — 82947 ASSAY GLUCOSE BLOOD QUANT: CPT

## 2024-04-04 PROCEDURE — P9016 RBC LEUKOCYTES REDUCED: HCPCS

## 2024-04-04 PROCEDURE — 2500000002 HC RX 250 W HCPCS SELF ADMINISTERED DRUGS (ALT 637 FOR MEDICARE OP, ALT 636 FOR OP/ED)

## 2024-04-04 PROCEDURE — 80069 RENAL FUNCTION PANEL: CPT

## 2024-04-04 PROCEDURE — 99233 SBSQ HOSP IP/OBS HIGH 50: CPT | Performed by: HOSPITALIST

## 2024-04-04 PROCEDURE — RXMED WILLOW AMBULATORY MEDICATION CHARGE

## 2024-04-04 PROCEDURE — 30233N1 TRANSFUSION OF NONAUTOLOGOUS RED BLOOD CELLS INTO PERIPHERAL VEIN, PERCUTANEOUS APPROACH: ICD-10-PCS | Performed by: INTERNAL MEDICINE

## 2024-04-04 PROCEDURE — 83735 ASSAY OF MAGNESIUM: CPT

## 2024-04-04 PROCEDURE — 36415 COLL VENOUS BLD VENIPUNCTURE: CPT | Performed by: STUDENT IN AN ORGANIZED HEALTH CARE EDUCATION/TRAINING PROGRAM

## 2024-04-04 PROCEDURE — 2500000004 HC RX 250 GENERAL PHARMACY W/ HCPCS (ALT 636 FOR OP/ED)

## 2024-04-04 PROCEDURE — 99024 POSTOP FOLLOW-UP VISIT: CPT | Performed by: TRANSPLANT SURGERY

## 2024-04-04 PROCEDURE — 86900 BLOOD TYPING SEROLOGIC ABO: CPT | Performed by: STUDENT IN AN ORGANIZED HEALTH CARE EDUCATION/TRAINING PROGRAM

## 2024-04-04 PROCEDURE — 85027 COMPLETE CBC AUTOMATED: CPT | Performed by: STUDENT IN AN ORGANIZED HEALTH CARE EDUCATION/TRAINING PROGRAM

## 2024-04-04 PROCEDURE — 2500000001 HC RX 250 WO HCPCS SELF ADMINISTERED DRUGS (ALT 637 FOR MEDICARE OP)

## 2024-04-04 RX ORDER — FERROUS SULFATE 325(65) MG
65 TABLET ORAL
Status: DISCONTINUED | OUTPATIENT
Start: 2024-04-05 | End: 2024-04-06 | Stop reason: HOSPADM

## 2024-04-04 RX ORDER — CALCITRIOL 0.25 UG/1
0.25 CAPSULE ORAL DAILY
Qty: 30 CAPSULE | Refills: 2 | Status: SHIPPED | OUTPATIENT
Start: 2024-04-04 | End: 2024-07-03

## 2024-04-04 RX ORDER — OXYCODONE HYDROCHLORIDE 5 MG/1
5 TABLET ORAL ONCE
Status: COMPLETED | OUTPATIENT
Start: 2024-04-04 | End: 2024-04-04

## 2024-04-04 RX ORDER — HYDRALAZINE HYDROCHLORIDE 25 MG/1
100 TABLET, FILM COATED ORAL 3 TIMES DAILY
Status: DISCONTINUED | OUTPATIENT
Start: 2024-04-04 | End: 2024-04-06 | Stop reason: HOSPADM

## 2024-04-04 RX ADMIN — PERMETHRIN CREAM 5% W/W: 50 CREAM TOPICAL at 12:32

## 2024-04-04 RX ADMIN — ASPIRIN 81 MG CHEWABLE TABLET 81 MG: 81 TABLET CHEWABLE at 12:04

## 2024-04-04 RX ADMIN — Medication 1 TABLET: at 12:04

## 2024-04-04 RX ADMIN — SODIUM ZIRCONIUM CYCLOSILICATE 5 G: 5 POWDER, FOR SUSPENSION ORAL at 12:00

## 2024-04-04 RX ADMIN — ACETAMINOPHEN 975 MG: 325 TABLET ORAL at 15:44

## 2024-04-04 RX ADMIN — CARVEDILOL 37.5 MG: 25 TABLET, FILM COATED ORAL at 12:23

## 2024-04-04 RX ADMIN — ISOSORBIDE MONONITRATE 60 MG: 60 TABLET, EXTENDED RELEASE ORAL at 12:08

## 2024-04-04 RX ADMIN — TACROLIMUS 2 MG: 1 CAPSULE ORAL at 21:07

## 2024-04-04 RX ADMIN — INSULIN GLARGINE 15 UNITS: 100 INJECTION, SOLUTION SUBCUTANEOUS at 12:03

## 2024-04-04 RX ADMIN — HYDRALAZINE HYDROCHLORIDE 100 MG: 25 TABLET ORAL at 15:44

## 2024-04-04 RX ADMIN — SULFAMETHOXAZOLE AND TRIMETHOPRIM 1 TABLET: 400; 80 TABLET ORAL at 21:08

## 2024-04-04 RX ADMIN — LIDOCAINE 1 PATCH: 4 PATCH TOPICAL at 12:00

## 2024-04-04 RX ADMIN — PREDNISONE 5 MG: 5 TABLET ORAL at 12:07

## 2024-04-04 RX ADMIN — AZATHIOPRINE 25 MG: 50 TABLET ORAL at 12:02

## 2024-04-04 RX ADMIN — DEXTROSE MONOHYDRATE 12.5 G: 25 INJECTION, SOLUTION INTRAVENOUS at 05:52

## 2024-04-04 RX ADMIN — TACROLIMUS: 1 OINTMENT TOPICAL at 09:00

## 2024-04-04 RX ADMIN — NIFEDIPINE 60 MG: 60 TABLET, FILM COATED, EXTENDED RELEASE ORAL at 12:01

## 2024-04-04 RX ADMIN — PRAVASTATIN SODIUM 10 MG: 20 TABLET ORAL at 21:14

## 2024-04-04 RX ADMIN — DOCUSATE SODIUM 100 MG: 100 CAPSULE, LIQUID FILLED ORAL at 12:04

## 2024-04-04 RX ADMIN — HEPARIN SODIUM 5000 UNITS: 5000 INJECTION INTRAVENOUS; SUBCUTANEOUS at 18:33

## 2024-04-04 RX ADMIN — TACROLIMUS: 1 OINTMENT TOPICAL at 21:00

## 2024-04-04 RX ADMIN — BUMETANIDE 2 MG: 2 TABLET ORAL at 21:08

## 2024-04-04 RX ADMIN — TACROLIMUS 1.5 MG: 0.5 CAPSULE ORAL at 12:02

## 2024-04-04 RX ADMIN — CARVEDILOL 37.5 MG: 25 TABLET, FILM COATED ORAL at 21:08

## 2024-04-04 RX ADMIN — INSULIN LISPRO 1 UNITS: 100 INJECTION, SOLUTION INTRAVENOUS; SUBCUTANEOUS at 18:40

## 2024-04-04 RX ADMIN — Medication 1000 UNITS: at 12:04

## 2024-04-04 RX ADMIN — CINACALCET 30 MG: 30 TABLET, FILM COATED ORAL at 12:01

## 2024-04-04 RX ADMIN — NIFEDIPINE 60 MG: 60 TABLET, FILM COATED, EXTENDED RELEASE ORAL at 21:07

## 2024-04-04 RX ADMIN — SULFAMETHOXAZOLE AND TRIMETHOPRIM 1 TABLET: 400; 80 TABLET ORAL at 12:04

## 2024-04-04 RX ADMIN — BUMETANIDE 2 MG: 2 TABLET ORAL at 15:44

## 2024-04-04 RX ADMIN — PANTOPRAZOLE SODIUM 40 MG: 40 TABLET, DELAYED RELEASE ORAL at 12:04

## 2024-04-04 RX ADMIN — OXYCODONE HYDROCHLORIDE 5 MG: 5 TABLET ORAL at 18:33

## 2024-04-04 RX ADMIN — CALCITRIOL CAPSULES 0.25 MCG 0.25 MCG: 0.25 CAPSULE ORAL at 15:44

## 2024-04-04 RX ADMIN — HYDRALAZINE HYDROCHLORIDE 100 MG: 25 TABLET ORAL at 21:07

## 2024-04-04 RX ADMIN — HYDRALAZINE HYDROCHLORIDE 100 MG: 25 TABLET ORAL at 12:07

## 2024-04-04 SDOH — SOCIAL STABILITY: SOCIAL INSECURITY: DOES ANYONE TRY TO KEEP YOU FROM HAVING/CONTACTING OTHER FRIENDS OR DOING THINGS OUTSIDE YOUR HOME?: NO

## 2024-04-04 SDOH — ECONOMIC STABILITY: INCOME INSECURITY: HOW HARD IS IT FOR YOU TO PAY FOR THE VERY BASICS LIKE FOOD, HOUSING, MEDICAL CARE, AND HEATING?: NOT VERY HARD

## 2024-04-04 SDOH — HEALTH STABILITY: MENTAL HEALTH: HOW OFTEN DO YOU HAVE 6 OR MORE DRINKS ON ONE OCCASION?: NEVER

## 2024-04-04 SDOH — SOCIAL STABILITY: SOCIAL INSECURITY: ARE THERE ANY APPARENT SIGNS OF INJURIES/BEHAVIORS THAT COULD BE RELATED TO ABUSE/NEGLECT?: NO

## 2024-04-04 SDOH — SOCIAL STABILITY: SOCIAL INSECURITY: HAVE YOU HAD THOUGHTS OF HARMING ANYONE ELSE?: NO

## 2024-04-04 SDOH — SOCIAL STABILITY: SOCIAL INSECURITY: DO YOU FEEL UNSAFE GOING BACK TO THE PLACE WHERE YOU ARE LIVING?: NO

## 2024-04-04 SDOH — HEALTH STABILITY: MENTAL HEALTH: HOW OFTEN DO YOU HAVE A DRINK CONTAINING ALCOHOL?: NEVER

## 2024-04-04 SDOH — HEALTH STABILITY: MENTAL HEALTH: HOW MANY STANDARD DRINKS CONTAINING ALCOHOL DO YOU HAVE ON A TYPICAL DAY?: PATIENT DOES NOT DRINK

## 2024-04-04 SDOH — HEALTH STABILITY: MENTAL HEALTH: EXPERIENCED ANY OF THE FOLLOWING LIFE EVENTS: OTHER (COMMENT)

## 2024-04-04 SDOH — SOCIAL STABILITY: SOCIAL INSECURITY: DO YOU FEEL ANYONE HAS EXPLOITED OR TAKEN ADVANTAGE OF YOU FINANCIALLY OR OF YOUR PERSONAL PROPERTY?: NO

## 2024-04-04 SDOH — SOCIAL STABILITY: SOCIAL INSECURITY: ABUSE: ADULT

## 2024-04-04 SDOH — HEALTH STABILITY: PHYSICAL HEALTH
ON AVERAGE, HOW MANY DAYS PER WEEK DO YOU ENGAGE IN MODERATE TO STRENUOUS EXERCISE (LIKE A BRISK WALK)?: PATIENT UNABLE TO ANSWER

## 2024-04-04 SDOH — SOCIAL STABILITY: SOCIAL INSECURITY: ARE YOU OR HAVE YOU BEEN THREATENED OR ABUSED PHYSICALLY, EMOTIONALLY, OR SEXUALLY BY ANYONE?: NO

## 2024-04-04 SDOH — ECONOMIC STABILITY: INCOME INSECURITY: IN THE LAST 12 MONTHS, WAS THERE A TIME WHEN YOU WERE NOT ABLE TO PAY THE MORTGAGE OR RENT ON TIME?: NO

## 2024-04-04 SDOH — SOCIAL STABILITY: SOCIAL INSECURITY: HAS ANYONE EVER THREATENED TO HURT YOUR FAMILY OR YOUR PETS?: NO

## 2024-04-04 SDOH — HEALTH STABILITY: PHYSICAL HEALTH: ON AVERAGE, HOW MANY MINUTES DO YOU ENGAGE IN EXERCISE AT THIS LEVEL?: 30 MIN

## 2024-04-04 SDOH — ECONOMIC STABILITY: HOUSING INSECURITY: IN THE LAST 12 MONTHS, HOW MANY PLACES HAVE YOU LIVED?: 1

## 2024-04-04 ASSESSMENT — COLUMBIA-SUICIDE SEVERITY RATING SCALE - C-SSRS
2. HAVE YOU ACTUALLY HAD ANY THOUGHTS OF KILLING YOURSELF?: NO
1. IN THE PAST MONTH, HAVE YOU WISHED YOU WERE DEAD OR WISHED YOU COULD GO TO SLEEP AND NOT WAKE UP?: NO
1. IN THE PAST MONTH, HAVE YOU WISHED YOU WERE DEAD OR WISHED YOU COULD GO TO SLEEP AND NOT WAKE UP?: NO
2. HAVE YOU ACTUALLY HAD ANY THOUGHTS OF KILLING YOURSELF?: NO
6. HAVE YOU EVER DONE ANYTHING, STARTED TO DO ANYTHING, OR PREPARED TO DO ANYTHING TO END YOUR LIFE?: NO
6. HAVE YOU EVER DONE ANYTHING, STARTED TO DO ANYTHING, OR PREPARED TO DO ANYTHING TO END YOUR LIFE?: NO

## 2024-04-04 ASSESSMENT — ACTIVITIES OF DAILY LIVING (ADL)
HEARING - RIGHT EAR: FUNCTIONAL
LACK_OF_TRANSPORTATION: NO
LACK_OF_TRANSPORTATION: PATIENT UNABLE TO ANSWER
DRESSING YOURSELF: NEEDS ASSISTANCE
WALKS IN HOME: INDEPENDENT
GROOMING: NEEDS ASSISTANCE
PATIENT'S MEMORY ADEQUATE TO SAFELY COMPLETE DAILY ACTIVITIES?: YES
TOILETING: INDEPENDENT
LACK_OF_TRANSPORTATION: NO
FEEDING YOURSELF: INDEPENDENT
ADEQUATE_TO_COMPLETE_ADL: YES
JUDGMENT_ADEQUATE_SAFELY_COMPLETE_DAILY_ACTIVITIES: YES
BATHING: INDEPENDENT
HEARING - LEFT EAR: FUNCTIONAL

## 2024-04-04 ASSESSMENT — COGNITIVE AND FUNCTIONAL STATUS - GENERAL
DAILY ACTIVITIY SCORE: 18
STANDING UP FROM CHAIR USING ARMS: A LITTLE
EATING MEALS: A LITTLE
TOILETING: A LITTLE
TOILETING: A LITTLE
MOBILITY SCORE: 18
DRESSING REGULAR LOWER BODY CLOTHING: A LITTLE
DAILY ACTIVITIY SCORE: 18
DRESSING REGULAR UPPER BODY CLOTHING: A LITTLE
TOILETING: A LITTLE
TURNING FROM BACK TO SIDE WHILE IN FLAT BAD: A LITTLE
TURNING FROM BACK TO SIDE WHILE IN FLAT BAD: A LITTLE
WALKING IN HOSPITAL ROOM: A LITTLE
DRESSING REGULAR LOWER BODY CLOTHING: A LITTLE
MOVING FROM LYING ON BACK TO SITTING ON SIDE OF FLAT BED WITH BEDRAILS: A LITTLE
DAILY ACTIVITIY SCORE: 18
CLIMB 3 TO 5 STEPS WITH RAILING: A LITTLE
DRESSING REGULAR UPPER BODY CLOTHING: A LITTLE
PERSONAL GROOMING: A LITTLE
MOBILITY SCORE: 18
WALKING IN HOSPITAL ROOM: A LITTLE
MOVING TO AND FROM BED TO CHAIR: A LITTLE
PERSONAL GROOMING: A LITTLE
MOVING TO AND FROM BED TO CHAIR: A LITTLE
PATIENT BASELINE BEDBOUND: NO
DRESSING REGULAR UPPER BODY CLOTHING: A LITTLE
CLIMB 3 TO 5 STEPS WITH RAILING: A LITTLE
MOVING TO AND FROM BED TO CHAIR: A LITTLE
MOVING FROM LYING ON BACK TO SITTING ON SIDE OF FLAT BED WITH BEDRAILS: A LITTLE
HELP NEEDED FOR BATHING: A LITTLE
TURNING FROM BACK TO SIDE WHILE IN FLAT BAD: A LITTLE
HELP NEEDED FOR BATHING: A LITTLE
DRESSING REGULAR LOWER BODY CLOTHING: A LITTLE
HELP NEEDED FOR BATHING: A LITTLE
WALKING IN HOSPITAL ROOM: A LITTLE
MOVING FROM LYING ON BACK TO SITTING ON SIDE OF FLAT BED WITH BEDRAILS: A LITTLE
EATING MEALS: A LITTLE
DRESSING REGULAR UPPER BODY CLOTHING: A LITTLE
MOBILITY SCORE: 18
STANDING UP FROM CHAIR USING ARMS: A LITTLE
MOBILITY SCORE: 18
MOVING FROM LYING ON BACK TO SITTING ON SIDE OF FLAT BED WITH BEDRAILS: A LITTLE
PERSONAL GROOMING: A LITTLE
PERSONAL GROOMING: A LITTLE
DAILY ACTIVITIY SCORE: 18
MOVING TO AND FROM BED TO CHAIR: A LITTLE
TOILETING: A LITTLE
EATING MEALS: A LITTLE
CLIMB 3 TO 5 STEPS WITH RAILING: A LITTLE
CLIMB 3 TO 5 STEPS WITH RAILING: A LITTLE
DRESSING REGULAR LOWER BODY CLOTHING: A LITTLE
EATING MEALS: A LITTLE
HELP NEEDED FOR BATHING: A LITTLE
STANDING UP FROM CHAIR USING ARMS: A LITTLE
STANDING UP FROM CHAIR USING ARMS: A LITTLE
TURNING FROM BACK TO SIDE WHILE IN FLAT BAD: A LITTLE
WALKING IN HOSPITAL ROOM: A LITTLE

## 2024-04-04 ASSESSMENT — PAIN SCALES - GENERAL
PAINLEVEL_OUTOF10: 0 - NO PAIN
PAINLEVEL_OUTOF10: 10 - WORST POSSIBLE PAIN
PAINLEVEL_OUTOF10: 0 - NO PAIN
PAINLEVEL_OUTOF10: 7

## 2024-04-04 ASSESSMENT — LIFESTYLE VARIABLES
HOW OFTEN DO YOU HAVE A DRINK CONTAINING ALCOHOL: NEVER
HOW MANY STANDARD DRINKS CONTAINING ALCOHOL DO YOU HAVE ON A TYPICAL DAY: PATIENT DOES NOT DRINK
AUDIT-C TOTAL SCORE: 0
AUDIT-C TOTAL SCORE: 0
PRESCIPTION_ABUSE_PAST_12_MONTHS: NO
SUBSTANCE_ABUSE_PAST_12_MONTHS: NO
SKIP TO QUESTIONS 9-10: 1
SKIP TO QUESTIONS 9-10: 1
AUDIT-C TOTAL SCORE: 0
PRESCIPTION_ABUSE_PAST_12_MONTHS: NO
SUBSTANCE_ABUSE_PAST_12_MONTHS: NO
HOW OFTEN DO YOU HAVE 6 OR MORE DRINKS ON ONE OCCASION: NEVER

## 2024-04-04 ASSESSMENT — PAIN DESCRIPTION - LOCATION: LOCATION: BACK

## 2024-04-04 ASSESSMENT — PAIN - FUNCTIONAL ASSESSMENT: PAIN_FUNCTIONAL_ASSESSMENT: 0-10

## 2024-04-04 NOTE — PROGRESS NOTES
Manolo Bashir is a 68 y.o. male on day 1 of admission presenting with Acute hypoxic respiratory failure (CMS/HCC).    Subjective   No acute events overnight. Patient denies headache, nausea, vomiting, and dizziness. Pt resting comfortably in bed.     Amalia (friend/home health nurse) was present at bedside during rounds. She brought home BP monitor with recorded measurements - lowest SBP in 80's. They report that the pt will not take any antihypertensive medications (carvedilol, hydralazine, nifedipine, Bumex, and Imdur) if he is hypotensive at night. Counseled patient that is it important to consistently take all medications as prescribed to be able to assess management of HTN. If pt is hypotensive, counseled that hydralazine is the only medication that should be modified. Emphasized the importance of still taking other antihypertensive medications.        Objective     Physical Exam  Constitutional:       Appearance: Normal appearance.   HENT:      Head: Normocephalic and atraumatic.      Mouth/Throat:      Mouth: Mucous membranes are moist.   Eyes:      Extraocular Movements: Extraocular movements intact.   Cardiovascular:      Rate and Rhythm: Normal rate and regular rhythm.      Pulses: Normal pulses.      Heart sounds: Normal heart sounds.   Pulmonary:      Effort: Pulmonary effort is normal.      Breath sounds: Normal breath sounds.   Abdominal:      General: Abdomen is flat. There is no distension.      Palpations: Abdomen is soft.      Tenderness: There is no abdominal tenderness.   Musculoskeletal:         General: Normal range of motion.   Skin:     General: Skin is warm and dry.      Capillary Refill: Capillary refill takes less than 2 seconds.   Neurological:      General: No focal deficit present.      Mental Status: He is alert and oriented to person, place, and time.   Psychiatric:         Mood and Affect: Mood normal.         Behavior: Behavior normal.         Last Recorded Vitals  Blood pressure  "138/65, pulse 68, temperature 36.6 °C (97.9 °F), resp. rate 18, height 1.727 m (5' 8\"), weight 77.1 kg (170 lb), SpO2 96 %.  Intake/Output last 3 Shifts:  I/O last 3 completed shifts:  In: 250 (3.2 mL/kg) [P.O.:250]  Out: - (0 mL/kg)   Weight: 77.1 kg     Relevant Results    Results for orders placed or performed during the hospital encounter of 04/03/24 (from the past 24 hour(s))   CBC and Auto Differential   Result Value Ref Range    WBC 4.6 4.4 - 11.3 x10*3/uL    nRBC 0.0 0.0 - 0.0 /100 WBCs    RBC 2.54 (L) 4.50 - 5.90 x10*6/uL    Hemoglobin 7.6 (L) 13.5 - 17.5 g/dL    Hematocrit 22.4 (L) 41.0 - 52.0 %    MCV 88 80 - 100 fL    MCH 29.9 26.0 - 34.0 pg    MCHC 33.9 32.0 - 36.0 g/dL    RDW 13.7 11.5 - 14.5 %    Platelets 145 (L) 150 - 450 x10*3/uL    Neutrophils % 67.6 40.0 - 80.0 %    Immature Granulocytes %, Automated 2.6 (H) 0.0 - 0.9 %    Lymphocytes % 15.1 13.0 - 44.0 %    Monocytes % 11.6 2.0 - 10.0 %    Eosinophils % 2.4 0.0 - 6.0 %    Basophils % 0.7 0.0 - 2.0 %    Neutrophils Absolute 3.09 1.20 - 7.70 x10*3/uL    Immature Granulocytes Absolute, Automated 0.12 0.00 - 0.70 x10*3/uL    Lymphocytes Absolute 0.69 (L) 1.20 - 4.80 x10*3/uL    Monocytes Absolute 0.53 0.10 - 1.00 x10*3/uL    Eosinophils Absolute 0.11 0.00 - 0.70 x10*3/uL    Basophils Absolute 0.03 0.00 - 0.10 x10*3/uL   Comprehensive metabolic panel   Result Value Ref Range    Glucose 70 (L) 74 - 99 mg/dL    Sodium 139 136 - 145 mmol/L    Potassium 5.0 3.5 - 5.3 mmol/L    Chloride 105 98 - 107 mmol/L    Bicarbonate 32 21 - 32 mmol/L    Anion Gap 7 (L) 10 - 20 mmol/L    Urea Nitrogen 61 (H) 6 - 23 mg/dL    Creatinine 6.80 (H) 0.50 - 1.30 mg/dL    eGFR 8 (L) >60 mL/min/1.73m*2    Calcium 9.9 8.6 - 10.6 mg/dL    Albumin 3.3 (L) 3.4 - 5.0 g/dL    Alkaline Phosphatase 46 33 - 136 U/L    Total Protein 6.1 (L) 6.4 - 8.2 g/dL    AST 28 9 - 39 U/L    Bilirubin, Total 0.3 0.0 - 1.2 mg/dL    ALT 26 10 - 52 U/L   Troponin I, High Sensitivity, Initial   Result " Value Ref Range    Troponin I, High Sensitivity 38 0 - 53 ng/L   B-type natriuretic peptide   Result Value Ref Range     (H) 0 - 99 pg/mL   ECG 12 lead   Result Value Ref Range    Ventricular Rate 56 BPM    Atrial Rate 166 BPM    WA Interval 180 ms    QRS Duration 140 ms    QT Interval 464 ms    QTC Calculation(Bazett) 447 ms    P Axis 71 degrees    R Axis -24 degrees    T Axis 33 degrees    QRS Count 9 beats    Q Onset 215 ms    P Onset 125 ms    P Offset 166 ms    T Offset 447 ms    QTC Fredericia 453 ms   Troponin, High Sensitivity, 1 Hour   Result Value Ref Range    Troponin I, High Sensitivity 35 0 - 53 ng/L   Sars-CoV-2 and Influenza A/B PCR   Result Value Ref Range    Flu A Result Not Detected Not Detected    Flu B Result Not Detected Not Detected    Coronavirus 2019, PCR Not Detected Not Detected   Urinalysis with Reflex Microscopic   Result Value Ref Range    Color, Urine Light-Yellow Light-Yellow, Yellow, Dark-Yellow    Appearance, Urine Clear Clear    Specific Gravity, Urine 1.009 1.005 - 1.035    pH, Urine 5.5 5.0, 5.5, 6.0, 6.5, 7.0, 7.5, 8.0    Protein, Urine 100 (2+) (A) NEGATIVE, 10 (TRACE), 20 (TRACE) mg/dL    Glucose, Urine Normal Normal mg/dL    Blood, Urine NEGATIVE NEGATIVE    Ketones, Urine NEGATIVE NEGATIVE mg/dL    Bilirubin, Urine NEGATIVE NEGATIVE    Urobilinogen, Urine Normal Normal mg/dL    Nitrite, Urine NEGATIVE NEGATIVE    Leukocyte Esterase, Urine NEGATIVE NEGATIVE   Microscopic Only, Urine   Result Value Ref Range    WBC, Urine NONE 1-5, NONE /HPF    RBC, Urine NONE NONE, 1-2, 3-5 /HPF    Squamous Epithelial Cells, Urine 1-9 (SPARSE) Reference range not established. /HPF    Mucus, Urine FEW Reference range not established. /LPF   CBC and Auto Differential   Result Value Ref Range    WBC 3.1 (L) 4.4 - 11.3 x10*3/uL    nRBC 0.0 0.0 - 0.0 /100 WBCs    RBC 2.39 (L) 4.50 - 5.90 x10*6/uL    Hemoglobin 6.9 (L) 13.5 - 17.5 g/dL    Hematocrit 21.2 (L) 41.0 - 52.0 %    MCV 89 80 - 100  fL    MCH 28.9 26.0 - 34.0 pg    MCHC 32.5 32.0 - 36.0 g/dL    RDW 13.5 11.5 - 14.5 %    Platelets 88 (L) 150 - 450 x10*3/uL    Neutrophils % 59.1 40.0 - 80.0 %    Immature Granulocytes %, Automated 2.3 (H) 0.0 - 0.9 %    Lymphocytes % 22.5 13.0 - 44.0 %    Monocytes % 13.1 2.0 - 10.0 %    Eosinophils % 2.3 0.0 - 6.0 %    Basophils % 0.7 0.0 - 2.0 %    Neutrophils Absolute 1.81 1.20 - 7.70 x10*3/uL    Immature Granulocytes Absolute, Automated 0.07 0.00 - 0.70 x10*3/uL    Lymphocytes Absolute 0.69 (L) 1.20 - 4.80 x10*3/uL    Monocytes Absolute 0.40 0.10 - 1.00 x10*3/uL    Eosinophils Absolute 0.07 0.00 - 0.70 x10*3/uL    Basophils Absolute 0.02 0.00 - 0.10 x10*3/uL   Renal Function Panel   Result Value Ref Range    Glucose 45 (LL) 74 - 99 mg/dL    Sodium 141 136 - 145 mmol/L    Potassium 5.2 3.5 - 5.3 mmol/L    Chloride 106 98 - 107 mmol/L    Bicarbonate 28 21 - 32 mmol/L    Anion Gap 12 10 - 20 mmol/L    Urea Nitrogen 62 (H) 6 - 23 mg/dL    Creatinine 6.20 (H) 0.50 - 1.30 mg/dL    eGFR 9 (L) >60 mL/min/1.73m*2    Calcium 9.6 8.6 - 10.6 mg/dL    Phosphorus 3.7 2.5 - 4.9 mg/dL    Albumin 3.0 (L) 3.4 - 5.0 g/dL   Magnesium   Result Value Ref Range    Magnesium 1.88 1.60 - 2.40 mg/dL   Tacrolimus level   Result Value Ref Range    Tacrolimus  4.3 <=15.0 ng/mL   POCT GLUCOSE   Result Value Ref Range    POCT Glucose 44 (L) 74 - 99 mg/dL   POCT GLUCOSE   Result Value Ref Range    POCT Glucose 90 74 - 99 mg/dL   POCT GLUCOSE   Result Value Ref Range    POCT Glucose 107 (H) 74 - 99 mg/dL   Urine Gray Tube   Result Value Ref Range    Extra Tube Hold for add-ons.    POCT GLUCOSE   Result Value Ref Range    POCT Glucose 99 74 - 99 mg/dL            Assessment/Plan   Active Problems:  There are no active Hospital Problems.    Mr. Bashir is a 68M with ESRD formerly on HD then s/p 2x renal transplants (1992, 2013, currently on prednisone, tacrolimus, azathioprine, last baseline Cr ~5.5), pancytopenia, angina (last EF 60-65% 11/23),  IDDM2 (last A1c 7.7 % 1/24), HTN, prostate cancer s/p radical prostatectomy, HCV (treated and RNA not detected last 10/18/23) presenting with dizziness. Patient has been experiencing low blood pressure at home secondary to medication mismanagement, likely contributing to orthostatic hypotension and symptoms of dizziness. Vitals on admission show hypotension to 106/55, initially with O2 sat at 92% on RA, placed on 2L NC. Labs unremarkable on admission except for known pancytopenia and know ESRD. CXR and CT with cardiomegaly and b/l pleural effusions. PE unremarkable. Low suspicion for infectious process given lack of symptoms, no fever on vitals, and no leukocytosis on labs. Since restarting home antihypertensive medication regimen, pt has been asymptomatic and BP appears to be stabilizing within goal.     #Dizziness  #Episodic hypotension  #HTN  :: TTE 11/18 showed EF 60 to 65%, left ventricular cavity moderately dilated, severe concentric left ventricular hypertrophy, mild to moderate aortic valve regurgitation  ::  on 4/3  - Transplant nephrology consulted: recommend repeat TTE to evaluate for structural abnormalities and intravascular volume status. Ordered TTE.   - Increased Hydralazine to home dose 100 mg TID (initially on 50 mg given hypotensive in ED)  - Continue home Carvedilol 37.5mg BID   - Continue home Imdur ER 60 mg daily  - Continue home Nifedipine ER 60 mg BID  - Decrease Bumex 2 mg BID   - Monitor pulse ox  - Check orthostats  - Strict I's and O's     #ESRD s/p kidney transplant  :: s/p dialysis access creation on 3/21 at  [facility]   :: Tacrolimus level 4.3 in AM today  :: UA on 4/3 showed 2+ proteinuria   - Continue home Tacrolimus 1.5mg AM and 2mg PM & tacro ointment  - Continue home Prednisone 5mg daily  - Continue home Imuran (Azathioprine) 25mg daily  - Continue home Bactrim 400-80 MWF  - Continue Lokelma 5g daily  - Continue home cinacalcet 30 mg daily  - Continue vitamin  D3/calcitriol/MV  - Transplant nephrology: no indication for starting hemodialysis at this time, recommend vascular consult to assess if existing AVF can be used for future dialysis    #Acute on chronic anemia  :: Hgb 6.9 on 4/4  - Transplant nephrology: plan to start DARIANA agent once blood pressure control is established  - Consent signed for blood transfusion, plan to transfuse one unit RBC     #Hyperlipidemia  - Continue home Pravastatin 10 mg nightly     #CAD  - Continue home ASA 81 mg     #DM  :: home glargine 20U with SSI  - Continue glargine 15U  - Mild SSI     #Back pain  ::MRI 09/19/2023 showing nonspecific focus of abnormal signal in R pedicle of L4 c/w osseous hemangioma, stress fracture, or osteoid osteoma. No discitis or osteomyelitis. No evidence of metastasis.  -Tylenol 975 mg q6h PRN  - Lidocaine patches     #Lice  - Continue permethrin cream to scalp daily     F: PRN  E: PRN  N: Regular  A: PIV  DVT: heparin subcutaneous  GI: pantoprazole, colace and miralax     Full Code  NOK: Amalia, home health nurse, [phone]           Aurelia Marks, MS3

## 2024-04-04 NOTE — NURSING NOTE
Patient had a hemoglobin of 6.9 and 1 unit of RBC was ordered. While blood was being given patients IV infiltrated in the middle of the blood transfusion. MD notified, then IV team notified. IV team came to the bed side immediately. IV team was unable to get IV access. Patient only wanted the IV access on the right arm below the ac. Patient has new fistula on the left arm and a old fistula on the right arm. As a result of this IV team was restricted to just the right forearm area. Patient was poked 3-4 times and IV team was unable to get an IV access. Currently patient is refusing to be poked again. Patient did receive 205 ml out of 276 ml of the 1 unit of RBC. CBC was sent per MD and came back still 6.9.  Per MD patient might receive another 1 unit once an IV access is initiated. This RN also  notified MD to come to the bed side and talk to the patient about the reason why he needs another IV. Patient is currently stable and will continue to monitor patient.  Sofia Pastor RN

## 2024-04-04 NOTE — PROGRESS NOTES
04/04/24 1516   Discharge Planning   Living Arrangements Children  (dtr, Rylee)   Support Systems Family members;Friends/neighbors   Assistance Needed none-pt states he is independent with ADLS   Type of Residence Private residence   Who is requesting discharge planning? Provider   Home or Post Acute Services In home services   Type of Home Care Services Home nursing visits   Patient expects to be discharged to: Home with insurance HC RN   Does the patient need discharge transport arranged? No  (pt states friend, Amalia will take him home)   Financial Resource Strain   How hard is it for you to pay for the very basics like food, housing, medical care, and heating? Not hard   Housing Stability   In the last 12 months, was there a time when you were not able to pay the mortgage or rent on time? N   In the last 12 months, was there a time when you did not have a steady place to sleep or slept in a shelter (including now)? N   Transportation Needs   In the past 12 months, has lack of transportation kept you from medical appointments or from getting medications? no   In the past 12 months, has lack of transportation kept you from meetings, work, or from getting things needed for daily living? No   Patient Choice   Provider Choice list and CMS website (https://medicare.gov/care-compare#search) for post-acute Quality and Resource Measure Data were provided and reviewed with: Patient       PCP: Elma Hutton NP   DATE OF LAST VISIT: 1 week ago  PHARMACY: [facility]   RECENT FALLS:  denies   EQUIPMENT USED IN HOME: N/A  HOME O2/CPAP/NEBS: N/A  TRANSPORT HOME: friend  CURRENT HC: Pt states he has a RN from his insurance that comes to visit him 2x/mth   DIABETIC/SUPPLIES NEEDED: Yes/has a glucometer at home    Address, phone and emergency contact information verified and updated. Pt denies needing any HC, states he started outpt therapy 2x/wk recently and would like to continue with that. All questions and concerns  answered. Will continue to follow for discharge needs.

## 2024-04-04 NOTE — CONSULTS
Reason For Consult  Evaluation of LUE AVG    History Of Present Illness  Manolo Bashir is a 68 y.o. male w PMHX of ESRD s/p 2 renal transplants (1992 followed by 2013), CKD 3, DM2 admitted for hypotension. Transplant surgery team created AVG (brachio axillary) on 3/21/24. Patient has had no issues with numbness or tingling, motor issues, issues with , or swelling in his left arm.  No pain at the incisions.  Denies fevers or chills.  This is his third access.  Understands that dialysis may happen sooner than later.     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    Surgical History  He has a past surgical history that includes Prostatectomy (10/11/2013); Ileostomy (04/25/2017); Other surgical history (04/21/2017); Ileostomy closure (08/17/2015); Other surgical history (08/17/2015); Other surgical history (08/17/2015); US guided percutaneous peritoneal or retroperitoneal fluid collection drainage (10/20/2022); transplant, kidney, open (1992); transplant, kidney, open (2013); and US guided percutaneous biopsy renal left (Left, 11/20/2023).     Social History  He reports that he has never smoked. He has been exposed to tobacco smoke. He has never used smokeless tobacco. He reports current alcohol use. He reports current  "drug use. Drug: Oxycodone.    Family History  Family History   Problem Relation Name Age of Onset    Bone cancer Mother      Other (corona's sarcome of the bone marrow) Mother      Prostate cancer Father      Diabetes Other Family Hist     Hypertension Other Family Hist         Allergies  Patient has no known allergies.    Review of Systems  Negative aside pertinent positives in the ROS.     Physical Exam  Constitutional: no acute distress, eating lunch  Neuro: A/O x4, no gross deficits   Psych: normal affect  HEENT: No deformities, no scleral icterus   Cardiac: RRR  Pulmonary: unlabored respirations   Abdomen: soft, non distended, non tender  Skin: warm and dry overall    Extremities: no swelling noted. Left upper extremity with well healing incisions, no edema, no erythema at sites, palpable thrill at medial arm over graft. No thrill noted at prior access sites. Left palpable radial, 2+. No motor or sensory deficits  MSK: moving all four    Last Recorded Vitals  Blood pressure 138/65, pulse 68, temperature 36.6 °C (97.9 °F), resp. rate 18, height 1.727 m (5' 8\"), weight 77.1 kg (170 lb), SpO2 96 %.     Assessment/Plan     Manolo Bashir is a 68 y.o. male w PMHX of ESRD s/p 2 renal transplants (1992 followed by 2013), CKD 3, DM2 admitted for hypotension. Transplant team consulted for evaluation of LUE brachio axillary AVG for use for upcoming dialysis, now 2 weeks post op. Palpable thrill, no evidence of venous insufficiency, or steal with well healing incisions. Plan to obtain duplex to assess flows. Anticipate AVG will be cleared for use.    - ordered vascular lab duplex for AVG  - pending results, will clear for use for dialysis.     D/w Dr. South.    Nereida Combs MD  PGY-1 VS Resident  Transplant Surgery Service    "

## 2024-04-04 NOTE — PROGRESS NOTES
Transplant Nephrology progress note     Date of admission: 4/3/2024     Manolo Bashir is a 68 y.o.  with PMH   Past Medical History:   Diagnosis Date    Anemia     Arthritis     Cataract     Chronic kidney disease, stage 3 unspecified (CMS/[facility]) 09/26/2018    Stage 3 chronic kidney disease    CKD (chronic kidney disease)     stage V    COVID-19 06/18/2020    COVID-19 virus infection    Diabetes (CMS/[facility])     ESRD (end stage renal disease) (CMS/[facility])     Focal and segmental proliferative glomerulonephritis 12/23/2023    HTN (hypertension)     Hyperlipidemia     Other long term (current) drug therapy 07/20/2021    High risk medication use    Personal history of other diseases of the circulatory system     Personal history of cardiac murmur    Personal history of other infectious and parasitic diseases 08/17/2015    History of hepatitis    Polyp, colonic 08/17/2023    Primary osteoarthritis of both ankles 08/17/2023    Prostate cancer (CMS/[facility])     Tubular adenoma of colon 08/17/2023    Unspecified kidney failure 08/17/2016    Renal failure        SUBJECTIVE:  Pt returns due to dizziness, and hypotension. He states he felt like he was going to pass out. He has noticed his BP quite low on some evenings ~100-110 and he holds his evening BP medications however in the morning his BP rises.   Continues to have good UOP. No SOB, seen laying flat.     PROBLEM LIST:  Active Problems:  There are no active Hospital Problems.         ALLERGIES:  No Known Allergies         CURRENT MEDICATIONS:  Scheduled medications  aspirin, 81 mg, oral, Daily  azaTHIOprine, 25 mg, oral, Daily  bumetanide, 2 mg, oral, BID  calcitriol, 0.25 mcg, oral, Daily  carvedilol, 37.5 mg, oral, BID  cholecalciferol, 1,000 Units, oral, Daily  cinacalcet, 30 mg, oral, Daily  heparin (porcine), 5,000 Units, subcutaneous, q8h  hydrALAZINE, 100 mg, oral, TID  insulin glargine, 15 Units, subcutaneous, q24h  insulin lispro, 0-5 Units, subcutaneous, TID with  "meals  isosorbide mononitrate ER, 60 mg, oral, Daily  lidocaine, 1 patch, transdermal, Daily  multivitamin with minerals, 1 tablet, oral, Daily  NIFEdipine ER, 60 mg, oral, BID  pantoprazole, 40 mg, oral, Daily before breakfast  permethrin, , Topical, Daily  polyethylene glycol, 17 g, oral, Daily  pravastatin, 10 mg, oral, Nightly  predniSONE, 5 mg, oral, Daily  sodium zirconium cyclosilicate, 5 g, oral, Daily  sulfamethoxazole-trimethoprim, 1 tablet, oral, BID  tacrolimus, 1.5 mg, oral, q AM  tacrolimus, 2 mg, oral, Nightly  tacrolimus, , Topical, BID      Continuous medications     PRN medications  PRN medications: acetaminophen, dextrose, dextrose, docusate sodium, glucagon, glucagon       OBJECTIVE:    VITALS: Visit Vitals  /65   Pulse 68   Temp 36.6 °C (97.9 °F)   Resp 18   Ht 1.727 m (5' 8\")   Wt 77.1 kg (170 lb)   SpO2 96%   BMI 25.85 kg/m²   Smoking Status Never   BSA 1.92 m²          General: No distress   Mucosa moist   AI, AC, AF     HEENT: PEERLA  CVS: S1 S2 no murmurs  RESP:  Lungs clear to auscultation   ABDO: Soft, non-tender   Neuro: A + O x 3  Skin: No rash   Extremities: No edema       LABS:  Results from last 72 hours   Lab Units 04/04/24  0440   WBC AUTO x10*3/uL 3.1*   HEMOGLOBIN g/dL 6.9*   MCV fL 89   PLATELETS AUTO x10*3/uL 88*   BUN mg/dL 62*   CREATININE mg/dL 6.20*   CALCIUM mg/dL 9.6   TACROLIMUS ng/mL 4.3              Intake/Output Summary (Last 24 hours) at 4/4/2024 1119  Last data filed at 4/3/2024 1904  Gross per 24 hour   Intake 250 ml   Output --   Net 250 ml            ASSESSMENT AND PLAN:  Manolo Bashir is a 68 y.o.M with PMH ESRD (on HD 8090-7307), s/p 2x renal transplants (1992, 2013) now with impaired allograft function CKD 3 (baseline Cr 2.5-3), on immunosuppression (pred, tac, azathioprine), h/o IgG and IgA lambda MGUS (recent hematologic eval including Bmbx with no e/o myeloma), DM2, HTN, prostate cancer s/p radical prostatectomy, baseline urinary incontinence, HCV " s/p rx (PCR neg 10/2023) who presented for dizziness and episodes of hypotension.    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 GN secondary to MGUS / denovo? Negative for rejection.     -Patient completed 2 doses of rituximab total of 2 g -1 /7/2024.          Allograft function:  #ESRD (on HD 5510-7604), s/p 2x renal transplants (1992, 2013)  #CKD V  -Since January and completing Rituximab pt did not show signs of renal recovery with progressive worsening of Scr due to hemodynamic injury from large variations in BP (100-190s) and repeat ISIAH 2/2 fluid overload/HTN urgency.   ->recent discharge 3/15 for fluid overload and discharged on bumex 2mg TID   -Reduce Bumex to 2mg BID and would recommend repeat TTE to evaluate for any structural abnormalities given BP and also assess intravascular volume status   -Metabolic parameters acceptable and volume status stable so no acute indication to start hemodialysis at this time however patient has AVF with thrill-> recommend vascular consult to assess if can be used for dialysis access in the future   -continue Lokelma 5mg daily      Immunosuppression: change azathioprine to 50 mg daily, prednisone 5 mg, tacrolimus 1.5mg AM, 2mg PM   -last Tac level 4.3    Hemodynamics:   -Blood pressures appears to have stabilized today with improvement from sBP 180-190s earlier   -continue the reduced dose of Hydralazine 100mg TID and recommend BP monitoring q4 to evaluate his BP closely on current medication regimen  -Continue with the carvedilol, Imdur, nifedipine at current dosing      Bone mineral disease:   -Calcium and phosphorus levels are wnl  -, vit D 31  -continue calcitriol 0.25mcg daily      Anemia  Leukopenia (ANC 1800): CMV, BK and EBV  negative  -IV iron 200mg x 3 given on last admission   -will start DARIANA agent once blood pressure better controlled        Hans Macias,   PGY 4 Nephrology Fellow

## 2024-04-05 ENCOUNTER — APPOINTMENT (OUTPATIENT)
Dept: VASCULAR MEDICINE | Facility: HOSPITAL | Age: 69
End: 2024-04-05
Payer: COMMERCIAL

## 2024-04-05 ENCOUNTER — APPOINTMENT (OUTPATIENT)
Dept: CARDIOLOGY | Facility: HOSPITAL | Age: 69
End: 2024-04-05
Payer: COMMERCIAL

## 2024-04-05 LAB
ALBUMIN SERPL BCP-MCNC: 3 G/DL (ref 3.4–5)
ANION GAP SERPL CALC-SCNC: 12 MMOL/L (ref 10–20)
AORTIC VALVE MEAN GRADIENT: 10.1 MMHG
AORTIC VALVE PEAK VELOCITY: 2.23 M/S
AV PEAK GRADIENT: 19.9 MMHG
AVA (PEAK VEL): 1.94 CM2
AVA (VTI): 2.21 CM2
BASOPHILS # BLD AUTO: 0.02 X10*3/UL (ref 0–0.1)
BASOPHILS NFR BLD AUTO: 0.6 %
BLOOD EXPIRATION DATE: NORMAL
BUN SERPL-MCNC: 61 MG/DL (ref 6–23)
CALCIUM SERPL-MCNC: 9.2 MG/DL (ref 8.6–10.6)
CHLORIDE SERPL-SCNC: 104 MMOL/L (ref 98–107)
CO2 SERPL-SCNC: 27 MMOL/L (ref 21–32)
CREAT SERPL-MCNC: 6.41 MG/DL (ref 0.5–1.3)
DISPENSE STATUS: NORMAL
EGFRCR SERPLBLD CKD-EPI 2021: 9 ML/MIN/1.73M*2
EJECTION FRACTION APICAL 4 CHAMBER: 56.1
EOSINOPHIL # BLD AUTO: 0.04 X10*3/UL (ref 0–0.7)
EOSINOPHIL NFR BLD AUTO: 1.3 %
ERYTHROCYTE [DISTWIDTH] IN BLOOD BY AUTOMATED COUNT: 13.9 % (ref 11.5–14.5)
ERYTHROCYTE [DISTWIDTH] IN BLOOD BY AUTOMATED COUNT: 14 % (ref 11.5–14.5)
GLUCOSE BLD MANUAL STRIP-MCNC: 114 MG/DL (ref 74–99)
GLUCOSE BLD MANUAL STRIP-MCNC: 128 MG/DL (ref 74–99)
GLUCOSE BLD MANUAL STRIP-MCNC: 160 MG/DL (ref 74–99)
GLUCOSE BLD MANUAL STRIP-MCNC: 162 MG/DL (ref 74–99)
GLUCOSE BLD MANUAL STRIP-MCNC: 187 MG/DL (ref 74–99)
GLUCOSE SERPL-MCNC: 128 MG/DL (ref 74–99)
HCT VFR BLD AUTO: 22.5 % (ref 41–52)
HCT VFR BLD AUTO: 22.6 % (ref 41–52)
HGB BLD-MCNC: 7 G/DL (ref 13.5–17.5)
HGB BLD-MCNC: 7.1 G/DL (ref 13.5–17.5)
IMM GRANULOCYTES # BLD AUTO: 0.03 X10*3/UL (ref 0–0.7)
IMM GRANULOCYTES NFR BLD AUTO: 0.9 % (ref 0–0.9)
LEFT ATRIUM VOLUME AREA LENGTH INDEX BSA: 61.3 ML/M2
LEFT VENTRICLE INTERNAL DIMENSION DIASTOLE: 5.66 CM (ref 3.5–6)
LEFT VENTRICULAR OUTFLOW TRACT DIAMETER: 2.09 CM
LV EJECTION FRACTION BIPLANE: 54 %
LYMPHOCYTES # BLD AUTO: 0.5 X10*3/UL (ref 1.2–4.8)
LYMPHOCYTES NFR BLD AUTO: 15.7 %
MAGNESIUM SERPL-MCNC: 1.94 MG/DL (ref 1.6–2.4)
MCH RBC QN AUTO: 28.8 PG (ref 26–34)
MCH RBC QN AUTO: 28.9 PG (ref 26–34)
MCHC RBC AUTO-ENTMCNC: 31 G/DL (ref 32–36)
MCHC RBC AUTO-ENTMCNC: 31.6 G/DL (ref 32–36)
MCV RBC AUTO: 92 FL (ref 80–100)
MCV RBC AUTO: 93 FL (ref 80–100)
MITRAL VALVE E/A RATIO: 1.83
MITRAL VALVE E/E' RATIO: 26.96
MONOCYTES # BLD AUTO: 0.34 X10*3/UL (ref 0.1–1)
MONOCYTES NFR BLD AUTO: 10.7 %
NEUTROPHILS # BLD AUTO: 2.25 X10*3/UL (ref 1.2–7.7)
NEUTROPHILS NFR BLD AUTO: 70.8 %
NRBC BLD-RTO: 0 /100 WBCS (ref 0–0)
NRBC BLD-RTO: 0 /100 WBCS (ref 0–0)
PHOSPHATE SERPL-MCNC: 3.3 MG/DL (ref 2.5–4.9)
PLATELET # BLD AUTO: 86 X10*3/UL (ref 150–450)
PLATELET # BLD AUTO: 96 X10*3/UL (ref 150–450)
POTASSIUM SERPL-SCNC: 4.7 MMOL/L (ref 3.5–5.3)
PRODUCT BLOOD TYPE: 5100
PRODUCT CODE: NORMAL
RBC # BLD AUTO: 2.43 X10*6/UL (ref 4.5–5.9)
RBC # BLD AUTO: 2.46 X10*6/UL (ref 4.5–5.9)
RIGHT VENTRICLE FREE WALL PEAK S': 15 CM/S
RIGHT VENTRICLE PEAK SYSTOLIC PRESSURE: 46 MMHG
SODIUM SERPL-SCNC: 138 MMOL/L (ref 136–145)
TRICUSPID ANNULAR PLANE SYSTOLIC EXCURSION: 2.9 CM
UNIT ABO: NORMAL
UNIT NUMBER: NORMAL
UNIT RH: NORMAL
UNIT VOLUME: 276
WBC # BLD AUTO: 3.2 X10*3/UL (ref 4.4–11.3)
WBC # BLD AUTO: 3.5 X10*3/UL (ref 4.4–11.3)
XM INTEP: NORMAL

## 2024-04-05 PROCEDURE — 93990 DOPPLER FLOW TESTING: CPT

## 2024-04-05 PROCEDURE — 36415 COLL VENOUS BLD VENIPUNCTURE: CPT

## 2024-04-05 PROCEDURE — 2500000005 HC RX 250 GENERAL PHARMACY W/O HCPCS

## 2024-04-05 PROCEDURE — 2500000001 HC RX 250 WO HCPCS SELF ADMINISTERED DRUGS (ALT 637 FOR MEDICARE OP)

## 2024-04-05 PROCEDURE — 2500000004 HC RX 250 GENERAL PHARMACY W/ HCPCS (ALT 636 FOR OP/ED): Mod: JZ | Performed by: INTERNAL MEDICINE

## 2024-04-05 PROCEDURE — 82947 ASSAY GLUCOSE BLOOD QUANT: CPT

## 2024-04-05 PROCEDURE — 99233 SBSQ HOSP IP/OBS HIGH 50: CPT | Performed by: HOSPITALIST

## 2024-04-05 PROCEDURE — 99232 SBSQ HOSP IP/OBS MODERATE 35: CPT | Performed by: INTERNAL MEDICINE

## 2024-04-05 PROCEDURE — 2500000002 HC RX 250 W HCPCS SELF ADMINISTERED DRUGS (ALT 637 FOR MEDICARE OP, ALT 636 FOR OP/ED)

## 2024-04-05 PROCEDURE — 93306 TTE W/DOPPLER COMPLETE: CPT | Performed by: STUDENT IN AN ORGANIZED HEALTH CARE EDUCATION/TRAINING PROGRAM

## 2024-04-05 PROCEDURE — 2500000004 HC RX 250 GENERAL PHARMACY W/ HCPCS (ALT 636 FOR OP/ED)

## 2024-04-05 PROCEDURE — 80069 RENAL FUNCTION PANEL: CPT

## 2024-04-05 PROCEDURE — 2500000004 HC RX 250 GENERAL PHARMACY W/ HCPCS (ALT 636 FOR OP/ED): Performed by: STUDENT IN AN ORGANIZED HEALTH CARE EDUCATION/TRAINING PROGRAM

## 2024-04-05 PROCEDURE — 83735 ASSAY OF MAGNESIUM: CPT

## 2024-04-05 PROCEDURE — 93306 TTE W/DOPPLER COMPLETE: CPT

## 2024-04-05 PROCEDURE — 85027 COMPLETE CBC AUTOMATED: CPT

## 2024-04-05 PROCEDURE — 85025 COMPLETE CBC W/AUTO DIFF WBC: CPT

## 2024-04-05 PROCEDURE — 1100000001 HC PRIVATE ROOM DAILY

## 2024-04-05 PROCEDURE — 93990 DOPPLER FLOW TESTING: CPT | Performed by: INTERNAL MEDICINE

## 2024-04-05 RX ORDER — AZATHIOPRINE 50 MG/1
50 TABLET ORAL DAILY
Qty: 30 TABLET | Refills: 0 | Status: SHIPPED | OUTPATIENT
Start: 2024-04-05 | End: 2024-04-05 | Stop reason: SDUPTHER

## 2024-04-05 RX ORDER — AZATHIOPRINE 50 MG/1
50 TABLET ORAL DAILY
Status: DISCONTINUED | OUTPATIENT
Start: 2024-04-05 | End: 2024-04-06 | Stop reason: HOSPADM

## 2024-04-05 RX ORDER — BUMETANIDE 2 MG/1
2 TABLET ORAL 2 TIMES DAILY
Start: 2024-04-05 | End: 2024-04-26 | Stop reason: SDUPTHER

## 2024-04-05 RX ORDER — AZATHIOPRINE 50 MG/1
50 TABLET ORAL DAILY
Qty: 30 TABLET | Refills: 0 | Status: SHIPPED | OUTPATIENT
Start: 2024-04-05 | End: 2024-05-19 | Stop reason: HOSPADM

## 2024-04-05 RX ADMIN — ASPIRIN 81 MG CHEWABLE TABLET 81 MG: 81 TABLET CHEWABLE at 08:19

## 2024-04-05 RX ADMIN — PANTOPRAZOLE SODIUM 40 MG: 40 TABLET, DELAYED RELEASE ORAL at 05:48

## 2024-04-05 RX ADMIN — LIDOCAINE 1 PATCH: 4 PATCH TOPICAL at 08:12

## 2024-04-05 RX ADMIN — TACROLIMUS 2 MG: 1 CAPSULE ORAL at 20:54

## 2024-04-05 RX ADMIN — CINACALCET 30 MG: 30 TABLET, FILM COATED ORAL at 08:12

## 2024-04-05 RX ADMIN — POLYETHYLENE GLYCOL 3350 17 G: 17 POWDER, FOR SOLUTION ORAL at 08:12

## 2024-04-05 RX ADMIN — HYDRALAZINE HYDROCHLORIDE 100 MG: 25 TABLET ORAL at 20:54

## 2024-04-05 RX ADMIN — HEPARIN SODIUM 5000 UNITS: 5000 INJECTION INTRAVENOUS; SUBCUTANEOUS at 16:12

## 2024-04-05 RX ADMIN — NIFEDIPINE 60 MG: 60 TABLET, FILM COATED, EXTENDED RELEASE ORAL at 20:54

## 2024-04-05 RX ADMIN — INSULIN LISPRO 1 UNITS: 100 INJECTION, SOLUTION INTRAVENOUS; SUBCUTANEOUS at 16:19

## 2024-04-05 RX ADMIN — ISOSORBIDE MONONITRATE 60 MG: 60 TABLET, EXTENDED RELEASE ORAL at 08:12

## 2024-04-05 RX ADMIN — HEPARIN SODIUM 5000 UNITS: 5000 INJECTION INTRAVENOUS; SUBCUTANEOUS at 08:26

## 2024-04-05 RX ADMIN — PREDNISONE 5 MG: 5 TABLET ORAL at 08:12

## 2024-04-05 RX ADMIN — BUMETANIDE 2 MG: 2 TABLET ORAL at 20:54

## 2024-04-05 RX ADMIN — SULFAMETHOXAZOLE AND TRIMETHOPRIM 1 TABLET: 400; 80 TABLET ORAL at 10:51

## 2024-04-05 RX ADMIN — NIFEDIPINE 60 MG: 60 TABLET, FILM COATED, EXTENDED RELEASE ORAL at 08:12

## 2024-04-05 RX ADMIN — HYDRALAZINE HYDROCHLORIDE 100 MG: 25 TABLET ORAL at 16:11

## 2024-04-05 RX ADMIN — SULFAMETHOXAZOLE AND TRIMETHOPRIM 1 TABLET: 400; 80 TABLET ORAL at 20:54

## 2024-04-05 RX ADMIN — TACROLIMUS 1.5 MG: 0.5 CAPSULE ORAL at 08:16

## 2024-04-05 RX ADMIN — DARBEPOETIN ALFA 100 MCG: 100 INJECTION, SOLUTION INTRAVENOUS; SUBCUTANEOUS at 16:11

## 2024-04-05 RX ADMIN — TACROLIMUS: 1 OINTMENT TOPICAL at 09:00

## 2024-04-05 RX ADMIN — HEPARIN SODIUM 5000 UNITS: 5000 INJECTION INTRAVENOUS; SUBCUTANEOUS at 00:40

## 2024-04-05 RX ADMIN — BUMETANIDE 2 MG: 2 TABLET ORAL at 08:19

## 2024-04-05 RX ADMIN — FERROUS SULFATE TAB 325 MG (65 MG ELEMENTAL FE) 1 TABLET: 325 (65 FE) TAB at 08:12

## 2024-04-05 RX ADMIN — AZATHIOPRINE 50 MG: 50 TABLET ORAL at 08:18

## 2024-04-05 RX ADMIN — DOCUSATE SODIUM 100 MG: 100 CAPSULE, LIQUID FILLED ORAL at 08:12

## 2024-04-05 RX ADMIN — Medication 1000 UNITS: at 08:12

## 2024-04-05 RX ADMIN — INSULIN GLARGINE 15 UNITS: 100 INJECTION, SOLUTION SUBCUTANEOUS at 08:00

## 2024-04-05 RX ADMIN — SODIUM ZIRCONIUM CYCLOSILICATE 5 G: 5 POWDER, FOR SUSPENSION ORAL at 08:14

## 2024-04-05 RX ADMIN — PRAVASTATIN SODIUM 10 MG: 20 TABLET ORAL at 20:54

## 2024-04-05 RX ADMIN — TACROLIMUS: 1 OINTMENT TOPICAL at 20:54

## 2024-04-05 RX ADMIN — CARVEDILOL 37.5 MG: 25 TABLET, FILM COATED ORAL at 08:12

## 2024-04-05 RX ADMIN — Medication 1 TABLET: at 08:12

## 2024-04-05 RX ADMIN — ACETAMINOPHEN 975 MG: 325 TABLET ORAL at 08:19

## 2024-04-05 RX ADMIN — CARVEDILOL 37.5 MG: 25 TABLET, FILM COATED ORAL at 20:53

## 2024-04-05 RX ADMIN — PERMETHRIN CREAM 5% W/W: 50 CREAM TOPICAL at 08:18

## 2024-04-05 RX ADMIN — HYDRALAZINE HYDROCHLORIDE 100 MG: 25 TABLET ORAL at 08:12

## 2024-04-05 RX ADMIN — CALCITRIOL CAPSULES 0.25 MCG 0.25 MCG: 0.25 CAPSULE ORAL at 08:12

## 2024-04-05 ASSESSMENT — COGNITIVE AND FUNCTIONAL STATUS - GENERAL: MOBILITY SCORE: 24

## 2024-04-05 ASSESSMENT — PAIN SCALES - WONG BAKER: WONGBAKER_NUMERICALRESPONSE: NO HURT

## 2024-04-05 ASSESSMENT — PAIN SCALES - GENERAL
PAINLEVEL_OUTOF10: 0 - NO PAIN
PAINLEVEL_OUTOF10: 3
PAINLEVEL_OUTOF10: 4
PAINLEVEL_OUTOF10: 5 - MODERATE PAIN

## 2024-04-05 ASSESSMENT — PAIN DESCRIPTION - LOCATION
LOCATION: BACK
LOCATION: BACK

## 2024-04-05 ASSESSMENT — PAIN - FUNCTIONAL ASSESSMENT: PAIN_FUNCTIONAL_ASSESSMENT: 0-10

## 2024-04-05 NOTE — PROGRESS NOTES
"Manolo Bashir is a 68 y.o. male on day 2 of admission presenting with Acute hypoxic respiratory failure (CMS/HCC).    Subjective   No acute events overnight. Pt endorses back pain that is chronic and not worse than normal. Denies vision changes, chest pain, headache, nausea, and vomiting.     In the evening, pt received only 205 out of 276 mL (1 unit) RBCs due to IV infiltrating during the transfusion. IV team was not able to reestablish access.        Objective     Physical Exam  Constitutional:       General: He is not in acute distress.     Appearance: Normal appearance.   HENT:      Head: Normocephalic and atraumatic.   Eyes:      Extraocular Movements: Extraocular movements intact.   Cardiovascular:      Rate and Rhythm: Normal rate and regular rhythm.   Pulmonary:      Effort: Pulmonary effort is normal. No respiratory distress.      Breath sounds: Normal breath sounds.   Abdominal:      General: Abdomen is flat. There is no distension.      Palpations: Abdomen is soft.      Tenderness: There is no abdominal tenderness.   Musculoskeletal:      Right lower leg: No edema.      Left lower leg: No edema.   Skin:     General: Skin is warm and dry.   Neurological:      General: No focal deficit present.      Mental Status: He is alert. Mental status is at baseline.   Psychiatric:         Mood and Affect: Mood normal.         Behavior: Behavior normal.         Last Recorded Vitals  Blood pressure 128/64, pulse 56, temperature 37.2 °C (99 °F), resp. rate 17, height 1.727 m (5' 8\"), weight 77.1 kg (170 lb), SpO2 94 %.  Intake/Output last 3 Shifts:  I/O last 3 completed shifts:  In: 490 (6.4 mL/kg) [P.O.:490]  Out: 300 (3.9 mL/kg) [Urine:300 (0.1 mL/kg/hr)]  Weight: 77.1 kg     Relevant Results      Results for orders placed or performed during the hospital encounter of 04/03/24 (from the past 24 hour(s))   POCT GLUCOSE   Result Value Ref Range    POCT Glucose 99 74 - 99 mg/dL   Type and Screen   Result Value Ref Range "    ABO TYPE O     Rh TYPE POS     ANTIBODY SCREEN NEG    CBC   Result Value Ref Range    WBC 2.7 (L) 4.4 - 11.3 x10*3/uL    nRBC 0.0 0.0 - 0.0 /100 WBCs    RBC 2.30 (L) 4.50 - 5.90 x10*6/uL    Hemoglobin 6.9 (L) 13.5 - 17.5 g/dL    Hematocrit 20.9 (L) 41.0 - 52.0 %    MCV 91 80 - 100 fL    MCH 30.0 26.0 - 34.0 pg    MCHC 33.0 32.0 - 36.0 g/dL    RDW 13.5 11.5 - 14.5 %    Platelets 101 (L) 150 - 450 x10*3/uL   Prepare RBC: 1 Units, Irradiated, Leukocytes Reduced (CMV reduced risk)   Result Value Ref Range    PRODUCT CODE N2262E63     Unit Number [id number]     Unit ABO O     Unit RH POS     XM INTEP COMP     Dispense Status XM     Blood Expiration Date April 19, 2024 23:59 EDT     PRODUCT BLOOD TYPE 5100     UNIT VOLUME 350    Prepare RBC: 1 Units, Leukocytes Reduced (CMV reduced risk)   Result Value Ref Range    PRODUCT CODE A7261T27     Unit Number [id number]     Unit ABO O     Unit RH POS     XM INTEP COMP     Dispense Status IS     Blood Expiration Date April 19, 2024 23:59 EDT     PRODUCT BLOOD TYPE 5100     UNIT VOLUME 276    CBC and Auto Differential   Result Value Ref Range    WBC 2.8 (L) 4.4 - 11.3 x10*3/uL    nRBC 0.0 0.0 - 0.0 /100 WBCs    RBC 2.31 (L) 4.50 - 5.90 x10*6/uL    Hemoglobin 6.9 (L) 13.5 - 17.5 g/dL    Hematocrit 20.8 (L) 41.0 - 52.0 %    MCV 90 80 - 100 fL    MCH 29.9 26.0 - 34.0 pg    MCHC 33.2 32.0 - 36.0 g/dL    RDW 13.7 11.5 - 14.5 %    Platelets 75 (L) 150 - 450 x10*3/uL    Neutrophils % 72.2 40.0 - 80.0 %    Immature Granulocytes %, Automated 1.8 (H) 0.0 - 0.9 %    Lymphocytes % 14.1 13.0 - 44.0 %    Monocytes % 9.0 2.0 - 10.0 %    Eosinophils % 2.2 0.0 - 6.0 %    Basophils % 0.7 0.0 - 2.0 %    Neutrophils Absolute 2.00 1.20 - 7.70 x10*3/uL    Immature Granulocytes Absolute, Automated 0.05 0.00 - 0.70 x10*3/uL    Lymphocytes Absolute 0.39 (L) 1.20 - 4.80 x10*3/uL    Monocytes Absolute 0.25 0.10 - 1.00 x10*3/uL    Eosinophils Absolute 0.06 0.00 - 0.70 x10*3/uL    Basophils  Absolute 0.02 0.00 - 0.10 x10*3/uL   POCT GLUCOSE   Result Value Ref Range    POCT Glucose 160 (H) 74 - 99 mg/dL   POCT GLUCOSE   Result Value Ref Range    POCT Glucose 151 (H) 74 - 99 mg/dL   CBC and Auto Differential   Result Value Ref Range    WBC 3.2 (L) 4.4 - 11.3 x10*3/uL    nRBC 0.0 0.0 - 0.0 /100 WBCs    RBC 2.43 (L) 4.50 - 5.90 x10*6/uL    Hemoglobin 7.0 (L) 13.5 - 17.5 g/dL    Hematocrit 22.6 (L) 41.0 - 52.0 %    MCV 93 80 - 100 fL    MCH 28.8 26.0 - 34.0 pg    MCHC 31.0 (L) 32.0 - 36.0 g/dL    RDW 14.0 11.5 - 14.5 %    Platelets 96 (L) 150 - 450 x10*3/uL    Neutrophils % 70.8 40.0 - 80.0 %    Immature Granulocytes %, Automated 0.9 0.0 - 0.9 %    Lymphocytes % 15.7 13.0 - 44.0 %    Monocytes % 10.7 2.0 - 10.0 %    Eosinophils % 1.3 0.0 - 6.0 %    Basophils % 0.6 0.0 - 2.0 %    Neutrophils Absolute 2.25 1.20 - 7.70 x10*3/uL    Immature Granulocytes Absolute, Automated 0.03 0.00 - 0.70 x10*3/uL    Lymphocytes Absolute 0.50 (L) 1.20 - 4.80 x10*3/uL    Monocytes Absolute 0.34 0.10 - 1.00 x10*3/uL    Eosinophils Absolute 0.04 0.00 - 0.70 x10*3/uL    Basophils Absolute 0.02 0.00 - 0.10 x10*3/uL   Renal Function Panel   Result Value Ref Range    Glucose 128 (H) 74 - 99 mg/dL    Sodium 138 136 - 145 mmol/L    Potassium 4.7 3.5 - 5.3 mmol/L    Chloride 104 98 - 107 mmol/L    Bicarbonate 27 21 - 32 mmol/L    Anion Gap 12 10 - 20 mmol/L    Urea Nitrogen 61 (H) 6 - 23 mg/dL    Creatinine 6.41 (H) 0.50 - 1.30 mg/dL    eGFR 9 (L) >60 mL/min/1.73m*2    Calcium 9.2 8.6 - 10.6 mg/dL    Phosphorus 3.3 2.5 - 4.9 mg/dL    Albumin 3.0 (L) 3.4 - 5.0 g/dL   Magnesium   Result Value Ref Range    Magnesium 1.94 1.60 - 2.40 mg/dL   CBC   Result Value Ref Range    WBC 3.5 (L) 4.4 - 11.3 x10*3/uL    nRBC 0.0 0.0 - 0.0 /100 WBCs    RBC 2.46 (L) 4.50 - 5.90 x10*6/uL    Hemoglobin 7.1 (L) 13.5 - 17.5 g/dL    Hematocrit 22.5 (L) 41.0 - 52.0 %    MCV 92 80 - 100 fL    MCH 28.9 26.0 - 34.0 pg    MCHC 31.6 (L) 32.0 - 36.0 g/dL    RDW 13.9  11.5 - 14.5 %    Platelets 86 (L) 150 - 450 x10*3/uL   POCT GLUCOSE   Result Value Ref Range    POCT Glucose 128 (H) 74 - 99 mg/dL         Assessment/Plan   Active Problems:  There are no active Hospital Problems.    Mr. Bashir is a 68M with ESRD formerly on HD then s/p 2x renal transplants (1992, 2013, currently on prednisone, tacrolimus, azathioprine, last baseline Cr ~5.5), pancytopenia, angina (last EF 60-65% 11/23), IDDM2 (last A1c 7.7 % 1/24), HTN, prostate cancer s/p radical prostatectomy, HCV (treated and RNA not detected last 10/18/23) presenting with dizziness. Patient has been experiencing low blood pressure at home secondary to medication mismanagement, likely contributing to orthostatic hypotension and symptoms of dizziness. Vitals on admission show hypotension to 106/55, initially with O2 sat at 92% on RA, placed on 2L NC. Labs unremarkable on admission except for known pancytopenia and know ESRD. CXR and CT with cardiomegaly and b/l pleural effusions. PE unremarkable. Low suspicion for infectious process given lack of symptoms, no fever on vitals, and no leukocytosis on labs. Since restarting home antihypertensive medication regimen, pt has been asymptomatic and BP appears to be stabilizing within goal.     #Dizziness  #Episodic hypotension  #HTN  :: TTE 11/18 showed EF 60 to 65%, left ventricular cavity moderately dilated, severe concentric left ventricular hypertrophy, mild to moderate aortic valve regurgitation  ::  on 4/3  - Transplant nephrology consulted: recommend repeat TTE to evaluate for structural abnormalities and intravascular volume status. Ordered TTE.  - Continue Hydralazine 100 mg TID   - Continue home Carvedilol 37.5mg BID   - Continue home Imdur ER 60 mg daily  - Continue home Nifedipine ER 60 mg BID  - Continue Bumex 2 mg BID   - Monitor pulse ox  - Check orthostats  - Strict I's and O's     #ESRD s/p kidney transplant  :: s/p dialysis access creation on 3/21 at  [facility]   ::  Tacrolimus level 4.3 in AM today  :: UA on 4/3 showed 2+ proteinuria   - Continue home Tacrolimus 1.5mg AM and 2mg PM & tacro ointment  - Continue home Prednisone 5mg daily  - Continue home Imuran (Azathioprine) 25mg daily  - Continue home Bactrim 400-80 MWF  - Continue Lokelma 5g daily  - Continue home cinacalcet 30 mg daily  - Continue vitamin D3/calcitriol/MV  - Transplant nephrology: no indication for starting hemodialysis at this time  - Transplant team consulted to assess AVG for dialysis: planning for Duplex today     #Acute on chronic anemia  :: Hgb 7.1 on 4/5, has been around 8-9 g/dL for the past 12 mo  - s/p 205 mL RBC on 4/4 (slightly less than 1 unit which is 276 mL)  - Transplant nephrology: plan to start DARIANA agent once blood pressure control is established    #Hyperlipidemia  - Continue home Pravastatin 10 mg nightly     #CAD  - Continue home ASA 81 mg     #DM  :: home glargine 20U with SSI  - Continue glargine 15U  - Mild SSI     #Back pain  ::MRI 09/19/2023 showing nonspecific focus of abnormal signal in R pedicle of L4 c/w osseous hemangioma, stress fracture, or osteoid osteoma. No discitis or osteomyelitis. No evidence of metastasis.  - Tylenol 975 mg q6h PRN  - Lidocaine patches     #Lice  - Continue permethrin cream to scalp daily     F: PRN  E: PRN  N: Regular  A: PIV  DVT: heparin subcutaneous  GI: pantoprazole, colace and miralax     Full Code  NOK: [facility] home health nurse, [phone]    Aurelia Marks, MS3

## 2024-04-05 NOTE — DISCHARGE INSTRUCTIONS
Mr. Bashir,    You were admitted due to episodes of low blood pressure and dizziness. We learned that you may have been taking the wrong dosage of one of your medications, so we continued your home medications at the intended dosage and your blood pressure stabilized. We also had the Nephrology team see you while inpatient, and they recommended an ultrasound of your graft as well as an ultrasound of your heart.     Please be sure to follow-up outpatient with the Transplant Nephrology team on April 16th to review the results of the graft ultrasound and assess your kidney function.    Please also follow-up with your Primary Care Doctor as scheduled on May 15th to obtain an Echocardiogram outpatient.    Blood pressure medications:  - Hydralazine 100 mg three times per day  - Bumex 2 mg twice a day  - Carvedilol 37.5 mg twice daily  - Imdur 60 mg daily  - Nifedipine 60 mg twice daily  - Please get your labs drawn in 1 week.    The Nephrology team also increased your Azathioprine from 25 mg daily to 50 mg daily.    Take Care,   Your  Care Team

## 2024-04-05 NOTE — SIGNIFICANT EVENT
Transplant Surgery Plan of Care Update    Manolo Bashir is a 68 y.o. male w PMHX of ESRD s/p 2 renal transplants (1992 followed by 2013), CKD 3, DM2 admitted for hypotension. Transplant team consulted for evaluation of LUE brachio axillary AVG for use for upcoming dialysis, now 2 weeks post op. Palpable thrill, no evidence of venous insufficiency, or steal with well healing incisions.     Duplex    CONCLUSIONS:  Dialysis Access Evaluation: Left brachial-axillary AVG appears widely patent.  Left Brachial Axillary Graft  Left prox flow depth 5.47 mm  Left mid flow depth 7.40mm  Left dist flow depth 5.94 mm.    Dialysis Access Graft                  Left Velocity  Arterial Anast  281 cm/s  Flow Prox       244 cm/s  Flow Prox/Mid   116 cm/s  Mid             87 cm/s  Mid/Flow Distal 97 cm/s  Flow Distal     92 cm/s  Outflow Anast   119 cm/s  Outflow Vein    148 cm/s  Pre Anast       367 cm/s  Post Anast      121 cm/s     Volume Flow 971.63 ml/min     Plan  Appropriate to use AVG if needed for dialysis. No further imaging required from transplant team.     Transplant team to sign off at this time. Please reach out with further questions.    D/w Dr. South.    Nereida Combs MD  PGY-1 Vascular Surgery Resident  Transplant Surgery Service  [id number]

## 2024-04-05 NOTE — CARE PLAN
The patient's goals for the shift include Pt will have not complaint of pain throughout the shift    The clinical goals for the shift include patient will will have Hgb above 7 during this shift

## 2024-04-05 NOTE — CARE PLAN
Problem: Diabetes  Goal: Achieve decreasing blood glucose levels by end of shift  Outcome: Progressing  Goal: Increase stability of blood glucose readings by end of shift  Outcome: Progressing  Goal: Decrease in ketones present in urine by end of shift  Outcome: Progressing  Goal: Maintain electrolyte levels within acceptable range throughout shift  Outcome: Progressing  Goal: Maintain glucose levels >70mg/dl to <250mg/dl throughout shift  Outcome: Progressing  Goal: No changes in neurological exam by end of shift  Outcome: Progressing  Goal: Learn about and adhere to nutrition recommendations by end of shift  Outcome: Progressing  Goal: Vital signs within normal range for age by end of shift  Outcome: Progressing  Goal: Increase self care and/or family involovement by end of shift  Outcome: Progressing  Goal: Receive DSME education by end of shift  Outcome: Progressing     Problem: Diabetes  Goal: Achieve decreasing blood glucose levels by end of shift  4/5/2024 1659 by Chasidy Henriquez RN  Outcome: Not Progressing  4/5/2024 1659 by Chasidy Henriquez RN  Outcome: Progressing  Goal: Increase stability of blood glucose readings by end of shift  4/5/2024 1659 by Chasidy Henriquez RN  Outcome: Not Progressing  4/5/2024 1659 by Chasidy Henriquez RN  Outcome: Progressing  Goal: Decrease in ketones present in urine by end of shift  4/5/2024 1659 by Chasidy Henriquez RN  Outcome: Not Progressing  4/5/2024 1659 by Chasidy Henriquez RN  Outcome: Progressing  Goal: Maintain electrolyte levels within acceptable range throughout shift  4/5/2024 1659 by Chasidy Henriquez RN  Outcome: Not Progressing  4/5/2024 1659 by Chasidy Henriquez RN  Outcome: Progressing  Goal: Maintain glucose levels >70mg/dl to <250mg/dl throughout shift  4/5/2024 1659 by Chasidy Henriquez RN  Outcome: Not Progressing  4/5/2024 1659 by Chasidy Henriquez RN  Outcome: Progressing  Goal: No changes in neurological exam by end of shift  4/5/2024 1659 by  Chasidy Henriquez RN  Outcome: Not Progressing  4/5/2024 1659 by Chasidy Henriquez RN  Outcome: Progressing  Goal: Learn about and adhere to nutrition recommendations by end of shift  4/5/2024 1659 by Chasidy Henriquez RN  Outcome: Not Progressing  4/5/2024 1659 by Chasidy Henriquez RN  Outcome: Progressing  Goal: Vital signs within normal range for age by end of shift  4/5/2024 1659 by Chasidy Henriquez RN  Outcome: Not Progressing  4/5/2024 1659 by Chasidy Henriquez RN  Outcome: Progressing  Goal: Increase self care and/or family involovement by end of shift  4/5/2024 1659 by Chasidy Henriquez RN  Outcome: Not Progressing  4/5/2024 1659 by Chasidy Henriquez RN  Outcome: Progressing  Goal: Receive DSME education by end of shift  4/5/2024 1659 by Chasidy Henriquez RN  Outcome: Not Progressing  4/5/2024 1659 by Chasidy Henriquez RN  Outcome: Progressing   The patient's goals for the shift include Pt will have not complaint of pain throughout the shift    The clinical goals for the shift include Pt will maintain systolic BP above 100    Over the shift, the patient did not make progress toward the following goals. Barriers to progression include . Recommendations to address these barriers include .

## 2024-04-05 NOTE — PROGRESS NOTES
Transplant Nephrology progress note     Date of admission: 4/3/2024     Manolo Bashir is a 68 y.o.  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 Overall feeling better. No shortness of breath, LE swelling. Good appetite. No nausea.     PROBLEM LIST:  Active Problems:  There are no active Hospital Problems.         ALLERGIES:  No Known Allergies         CURRENT MEDICATIONS:  Scheduled medications  aspirin, 81 mg, oral, Daily  azaTHIOprine, 50 mg, oral, Daily  bumetanide, 2 mg, oral, BID  calcitriol, 0.25 mcg, oral, Daily  carvedilol, 37.5 mg, oral, BID  cholecalciferol, 1,000 Units, oral, Daily  cinacalcet, 30 mg, oral, Daily  ferrous sulfate (325 mg ferrous sulfate), 65 mg of iron, oral, Daily with breakfast  heparin (porcine), 5,000 Units, subcutaneous, q8h  hydrALAZINE, 100 mg, oral, TID  insulin glargine, 15 Units, subcutaneous, q24h  insulin lispro, 0-5 Units, subcutaneous, TID with meals  isosorbide mononitrate ER, 60 mg, oral, Daily  lidocaine, 1 patch, transdermal,  "Daily  multivitamin with minerals, 1 tablet, oral, Daily  NIFEdipine ER, 60 mg, oral, BID  pantoprazole, 40 mg, oral, Daily before breakfast  permethrin, , Topical, Daily  polyethylene glycol, 17 g, oral, Daily  pravastatin, 10 mg, oral, Nightly  predniSONE, 5 mg, oral, Daily  sodium zirconium cyclosilicate, 5 g, oral, Daily  sulfamethoxazole-trimethoprim, 1 tablet, oral, BID  tacrolimus, 1.5 mg, oral, q AM  tacrolimus, 2 mg, oral, Nightly  tacrolimus, , Topical, BID      Continuous medications     PRN medications  PRN medications: acetaminophen, dextrose, dextrose, docusate sodium, glucagon, glucagon       OBJECTIVE:    VITALS: Visit Vitals  /64   Pulse 56   Temp 37.2 °C (99 °F)   Resp 17   Ht 1.727 m (5' 8\")   Wt 77.1 kg (170 lb)   SpO2 94%   BMI 25.85 kg/m²   Smoking Status Never   BSA 1.92 m²          General: No distress   CVS: S1 S2 no murmurs  RESP:  Lungs clear to auscultation   ABDO: Soft, non-tender   Neuro: A + O x 3  Skin: No rash   Extremities: No edema       LABS:  Results from last 72 hours   Lab Units 04/05/24  0711 04/04/24  1220 04/04/24  0440   WBC AUTO x10*3/uL 3.5*   < > 3.1*   HEMOGLOBIN g/dL 7.1*   < > 6.9*   MCV fL 92   < > 89   PLATELETS AUTO x10*3/uL 86*   < > 88*   BUN mg/dL 61*  --  62*   CREATININE mg/dL 6.41*  --  6.20*   CALCIUM mg/dL 9.2  --  9.6   TACROLIMUS ng/mL  --   --  4.3    < > = values in this interval not displayed.              Intake/Output Summary (Last 24 hours) at 4/5/2024 1206  Last data filed at 4/5/2024 0836  Gross per 24 hour   Intake 240 ml   Output 1000 ml   Net -760 ml            ASSESSMENT AND PLAN:  Manolo Bashir is a 68 y.o.M with PMH ESRD (on HD [id number]), s/p 2x renal transplants (1992, 2013) now with impaired allograft function CKD 3 (baseline Cr 2.5-3), on immunosuppression (pred, tac, azathioprine), h/o IgG and IgA lambda MGUS (recent hematologic eval including Bmbx with no e/o myeloma), DM2, HTN, prostate cancer s/p radical prostatectomy, " baseline urinary incontinence, HCV s/p rx (PCR neg 10/2023) who presented for dizziness and episodes of hypotension.    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 GN secondary to MGUS / denovo? Negative for rejection.     -Patient completed 2 doses of rituximab total of 2 g -1 /7/2024.          Allograft function:  #ESRD (on HD [id number]), s/p 2x renal transplants (1992, 2013)  #CKD V  -Since January and completing Rituximab pt did not show signs of renal recovery with progressive worsening of Scr due to hemodynamic injury from large variations in BP (100-190s) and repeat ISIAH 2/2 fluid overload/HTN urgency.   ->recent discharge 3/15 for fluid overload and discharged on bumex 2mg TID   -Continue reduced dose at Bumex to 2mg BID   -recommend repeat TTE -> ok to obtain outpatient if unable to be completed during inpatient stay   -Metabolic parameters acceptable and volume status stable so no acute indication to start hemodialysis at this time. AVG placed 2 weeks ago -> ok to use per surgery evaluation when needed.   -Recommend monitoring today and if labs remain stable in next 1-2 days ok for discharge with   close follow up outpatient given advanced CKD. Plan for follow up outpatient 4/16  -continue Lokelma 5mg daily      Immunosuppression: change azathioprine to 50 mg daily, prednisone 5 mg, tacrolimus 1.5mg AM, 2mg PM   -last Tac level 4.3    Hemodynamics:   -Blood pressures appears to have stabilized   -continue the reduced dose of Hydralazine 100mg TID   -Continue with the carvedilol, Imdur, nifedipine at current dosing      Bone mineral disease:   -Calcium and phosphorus levels are wnl  -, vit D 31  -continue calcitriol 0.25mcg daily      Anemia  Leukopenia (ANC 1800): CMV,  BK and EBV negative  -IV iron 200mg x 3 given on last admission   -will start Darbe 100mcg today        Hans Macias, DO  PGY 4 Nephrology Fellow

## 2024-04-06 ENCOUNTER — PHARMACY VISIT (OUTPATIENT)
Dept: PHARMACY | Facility: CLINIC | Age: 69
End: 2024-04-06
Payer: MEDICAID

## 2024-04-06 VITALS
SYSTOLIC BLOOD PRESSURE: 139 MMHG | RESPIRATION RATE: 18 BRPM | OXYGEN SATURATION: 97 % | WEIGHT: 170 LBS | BODY MASS INDEX: 25.76 KG/M2 | HEART RATE: 64 BPM | TEMPERATURE: 97 F | DIASTOLIC BLOOD PRESSURE: 65 MMHG | HEIGHT: 68 IN

## 2024-04-06 LAB
ALBUMIN SERPL BCP-MCNC: 3.3 G/DL (ref 3.4–5)
ANION GAP SERPL CALC-SCNC: 12 MMOL/L (ref 10–20)
BASOPHILS # BLD AUTO: 0.02 X10*3/UL (ref 0–0.1)
BASOPHILS NFR BLD AUTO: 0.5 %
BUN SERPL-MCNC: 61 MG/DL (ref 6–23)
CALCIUM SERPL-MCNC: 9.9 MG/DL (ref 8.6–10.6)
CHLORIDE SERPL-SCNC: 105 MMOL/L (ref 98–107)
CO2 SERPL-SCNC: 26 MMOL/L (ref 21–32)
CREAT SERPL-MCNC: 6.24 MG/DL (ref 0.5–1.3)
EGFRCR SERPLBLD CKD-EPI 2021: 9 ML/MIN/1.73M*2
EOSINOPHIL # BLD AUTO: 0.1 X10*3/UL (ref 0–0.7)
EOSINOPHIL NFR BLD AUTO: 2.7 %
ERYTHROCYTE [DISTWIDTH] IN BLOOD BY AUTOMATED COUNT: 13.9 % (ref 11.5–14.5)
GLUCOSE BLD MANUAL STRIP-MCNC: 118 MG/DL (ref 74–99)
GLUCOSE BLD MANUAL STRIP-MCNC: 157 MG/DL (ref 74–99)
GLUCOSE SERPL-MCNC: 115 MG/DL (ref 74–99)
HCT VFR BLD AUTO: 25.6 % (ref 41–52)
HGB BLD-MCNC: 8.2 G/DL (ref 13.5–17.5)
IMM GRANULOCYTES # BLD AUTO: 0.05 X10*3/UL (ref 0–0.7)
IMM GRANULOCYTES NFR BLD AUTO: 1.3 % (ref 0–0.9)
LYMPHOCYTES # BLD AUTO: 0.82 X10*3/UL (ref 1.2–4.8)
LYMPHOCYTES NFR BLD AUTO: 21.9 %
MCH RBC QN AUTO: 28.9 PG (ref 26–34)
MCHC RBC AUTO-ENTMCNC: 32 G/DL (ref 32–36)
MCV RBC AUTO: 90 FL (ref 80–100)
MONOCYTES # BLD AUTO: 0.42 X10*3/UL (ref 0.1–1)
MONOCYTES NFR BLD AUTO: 11.2 %
NEUTROPHILS # BLD AUTO: 2.33 X10*3/UL (ref 1.2–7.7)
NEUTROPHILS NFR BLD AUTO: 62.4 %
NRBC BLD-RTO: 0 /100 WBCS (ref 0–0)
PHOSPHATE SERPL-MCNC: 2.8 MG/DL (ref 2.5–4.9)
PLATELET # BLD AUTO: 114 X10*3/UL (ref 150–450)
POTASSIUM SERPL-SCNC: 4.4 MMOL/L (ref 3.5–5.3)
RBC # BLD AUTO: 2.84 X10*6/UL (ref 4.5–5.9)
SODIUM SERPL-SCNC: 139 MMOL/L (ref 136–145)
TACROLIMUS BLD-MCNC: 3.5 NG/ML
WBC # BLD AUTO: 3.7 X10*3/UL (ref 4.4–11.3)

## 2024-04-06 PROCEDURE — 99239 HOSP IP/OBS DSCHRG MGMT >30: CPT | Performed by: STUDENT IN AN ORGANIZED HEALTH CARE EDUCATION/TRAINING PROGRAM

## 2024-04-06 PROCEDURE — 99233 SBSQ HOSP IP/OBS HIGH 50: CPT | Performed by: HOSPITALIST

## 2024-04-06 PROCEDURE — 2500000001 HC RX 250 WO HCPCS SELF ADMINISTERED DRUGS (ALT 637 FOR MEDICARE OP)

## 2024-04-06 PROCEDURE — 85025 COMPLETE CBC W/AUTO DIFF WBC: CPT

## 2024-04-06 PROCEDURE — 2500000005 HC RX 250 GENERAL PHARMACY W/O HCPCS

## 2024-04-06 PROCEDURE — 2500000004 HC RX 250 GENERAL PHARMACY W/ HCPCS (ALT 636 FOR OP/ED)

## 2024-04-06 PROCEDURE — 80197 ASSAY OF TACROLIMUS: CPT

## 2024-04-06 PROCEDURE — 2500000004 HC RX 250 GENERAL PHARMACY W/ HCPCS (ALT 636 FOR OP/ED): Performed by: STUDENT IN AN ORGANIZED HEALTH CARE EDUCATION/TRAINING PROGRAM

## 2024-04-06 PROCEDURE — 82947 ASSAY GLUCOSE BLOOD QUANT: CPT

## 2024-04-06 PROCEDURE — 36415 COLL VENOUS BLD VENIPUNCTURE: CPT

## 2024-04-06 PROCEDURE — 84100 ASSAY OF PHOSPHORUS: CPT

## 2024-04-06 PROCEDURE — 2500000002 HC RX 250 W HCPCS SELF ADMINISTERED DRUGS (ALT 637 FOR MEDICARE OP, ALT 636 FOR OP/ED)

## 2024-04-06 RX ADMIN — SULFAMETHOXAZOLE AND TRIMETHOPRIM 1 TABLET: 400; 80 TABLET ORAL at 08:41

## 2024-04-06 RX ADMIN — HYDRALAZINE HYDROCHLORIDE 100 MG: 25 TABLET ORAL at 08:40

## 2024-04-06 RX ADMIN — CINACALCET 30 MG: 30 TABLET, FILM COATED ORAL at 08:40

## 2024-04-06 RX ADMIN — INSULIN GLARGINE 15 UNITS: 100 INJECTION, SOLUTION SUBCUTANEOUS at 08:44

## 2024-04-06 RX ADMIN — CARVEDILOL 37.5 MG: 25 TABLET, FILM COATED ORAL at 08:39

## 2024-04-06 RX ADMIN — HEPARIN SODIUM 5000 UNITS: 5000 INJECTION INTRAVENOUS; SUBCUTANEOUS at 00:42

## 2024-04-06 RX ADMIN — TACROLIMUS: 1 OINTMENT TOPICAL at 09:05

## 2024-04-06 RX ADMIN — PERMETHRIN CREAM 5% W/W: 50 CREAM TOPICAL at 09:04

## 2024-04-06 RX ADMIN — CALCITRIOL CAPSULES 0.25 MCG 0.25 MCG: 0.25 CAPSULE ORAL at 08:41

## 2024-04-06 RX ADMIN — LIDOCAINE 1 PATCH: 4 PATCH TOPICAL at 08:39

## 2024-04-06 RX ADMIN — AZATHIOPRINE 50 MG: 50 TABLET ORAL at 08:41

## 2024-04-06 RX ADMIN — ASPIRIN 81 MG CHEWABLE TABLET 81 MG: 81 TABLET CHEWABLE at 08:40

## 2024-04-06 RX ADMIN — TACROLIMUS 1.5 MG: 0.5 CAPSULE ORAL at 08:41

## 2024-04-06 RX ADMIN — BUMETANIDE 2 MG: 2 TABLET ORAL at 08:41

## 2024-04-06 RX ADMIN — ISOSORBIDE MONONITRATE 60 MG: 60 TABLET, EXTENDED RELEASE ORAL at 08:41

## 2024-04-06 RX ADMIN — PANTOPRAZOLE SODIUM 40 MG: 40 TABLET, DELAYED RELEASE ORAL at 08:49

## 2024-04-06 RX ADMIN — Medication 1 TABLET: at 08:40

## 2024-04-06 RX ADMIN — FERROUS SULFATE TAB 325 MG (65 MG ELEMENTAL FE) 1 TABLET: 325 (65 FE) TAB at 08:40

## 2024-04-06 RX ADMIN — HEPARIN SODIUM 5000 UNITS: 5000 INJECTION INTRAVENOUS; SUBCUTANEOUS at 08:40

## 2024-04-06 RX ADMIN — INSULIN LISPRO 1 UNITS: 100 INJECTION, SOLUTION INTRAVENOUS; SUBCUTANEOUS at 14:10

## 2024-04-06 RX ADMIN — Medication 1000 UNITS: at 08:40

## 2024-04-06 RX ADMIN — SODIUM ZIRCONIUM CYCLOSILICATE 5 G: 5 POWDER, FOR SUSPENSION ORAL at 08:41

## 2024-04-06 RX ADMIN — PREDNISONE 5 MG: 5 TABLET ORAL at 08:39

## 2024-04-06 RX ADMIN — NIFEDIPINE 60 MG: 60 TABLET, FILM COATED, EXTENDED RELEASE ORAL at 08:42

## 2024-04-06 NOTE — CARE PLAN
Problem: Safety  Goal: Patient will be injury free during hospitalization  Outcome: Progressing  Goal: I will remain free of falls  Outcome: Progressing   The patient's goals for the shift include Pt will have not complaint of pain throughout the shift    The clinical goals for the shift include Pt will maintain systolic BP above 100    Over the shift, the patient did not make progress toward the following goals.

## 2024-04-06 NOTE — PROGRESS NOTES
Transplant Nephrology progress note     Date of admission: 4/3/2024     Manolo Bashir is a 68 y.o.  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    HTN (hypertension)     Hyperlipidemia     Other long term (current) drug therapy 07/20/2021    High risk medication use    Personal history of other diseases of the circulatory system     Personal history of cardiac murmur    Personal history of other infectious and parasitic diseases 08/17/2015    History of hepatitis    Polyp, colonic 08/17/2023    Primary osteoarthritis of both ankles 08/17/2023    Prostate cancer (CMS/[facility])     Tubular adenoma of colon 08/17/2023    Unspecified kidney failure 08/17/2016    Renal failure        SUBJECTIVE:  Pt doing ok this morning. Overall feeling better. No shortness of breath, LE swelling. Good appetite. No nausea.     PROBLEM LIST:  Active Problems:  There are no active Hospital Problems.         ALLERGIES:  No Known Allergies         CURRENT MEDICATIONS:  Scheduled medications  aspirin, 81 mg, oral, Daily  azaTHIOprine, 50 mg, oral, Daily  bumetanide, 2 mg, oral, BID  calcitriol, 0.25 mcg, oral, Daily  carvedilol, 37.5 mg, oral, BID  cholecalciferol, 1,000 Units, oral, Daily  cinacalcet, 30 mg, oral, Daily  ferrous sulfate (325 mg ferrous sulfate), 65 mg of iron, oral, Daily with breakfast  heparin (porcine), 5,000 Units, subcutaneous, q8h  hydrALAZINE, 100 mg, oral, TID  insulin glargine, 15 Units, subcutaneous, q24h  insulin lispro, 0-5 Units, subcutaneous, TID with meals  isosorbide mononitrate ER, 60 mg, oral, Daily  lidocaine, 1 patch, transdermal,  "Daily  multivitamin with minerals, 1 tablet, oral, Daily  NIFEdipine ER, 60 mg, oral, BID  pantoprazole, 40 mg, oral, Daily before breakfast  permethrin, , Topical, Daily  polyethylene glycol, 17 g, oral, Daily  pravastatin, 10 mg, oral, Nightly  predniSONE, 5 mg, oral, Daily  sodium zirconium cyclosilicate, 5 g, oral, Daily  sulfamethoxazole-trimethoprim, 1 tablet, oral, BID  tacrolimus, 1.5 mg, oral, q AM  tacrolimus, 2 mg, oral, Nightly  tacrolimus, , Topical, BID      Continuous medications     PRN medications  PRN medications: acetaminophen, dextrose, dextrose, docusate sodium, glucagon, glucagon       OBJECTIVE:    VITALS: Visit Vitals  /64   Pulse 67   Temp 37.2 °C (99 °F)   Resp 16   Ht 1.727 m (5' 8\")   Wt 77.1 kg (170 lb)   SpO2 95%   BMI 25.85 kg/m²   Smoking Status Never   BSA 1.92 m²          General: No distress   CVS: S1 S2 no murmurs  RESP:  Lungs clear to auscultation   ABDO: Soft, non-tender   Neuro: A + O x 3  Skin: No rash   Extremities: No edema       LABS:  Results from last 72 hours   Lab Units 04/06/24  0911 04/04/24  1220 04/04/24  0440   WBC AUTO x10*3/uL 3.7*   < > 3.1*   HEMOGLOBIN g/dL 8.2*   < > 6.9*   MCV fL 90   < > 89   PLATELETS AUTO x10*3/uL 114*   < > 88*   BUN mg/dL 61*   < > 62*   CREATININE mg/dL 6.24*   < > 6.20*   CALCIUM mg/dL 9.9   < > 9.6   TACROLIMUS ng/mL  --   --  4.3    < > = values in this interval not displayed.              Intake/Output Summary (Last 24 hours) at 4/6/2024 1220  Last data filed at 4/5/2024 2237  Gross per 24 hour   Intake 240 ml   Output 650 ml   Net -410 ml            ASSESSMENT AND PLAN:  Manolo Bashir is a 68 y.o.M with PMH ESRD (on HD [id number]), s/p 2x renal transplants (1992, 2013) now with impaired allograft function CKD 3 (baseline Cr 2.5-3), on immunosuppression (pred, tac, azathioprine), h/o IgG and IgA lambda MGUS (recent hematologic eval including Bmbx with no e/o myeloma), DM2, HTN, prostate cancer s/p radical prostatectomy, " baseline urinary incontinence, HCV s/p rx (PCR neg 10/2023) who presented for dizziness and episodes of hypotension.    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 GN secondary to MGUS / denovo? Negative for rejection.     -Patient completed 2 doses of rituximab total of 2 g -1 /7/2024.          Allograft function:  #ESRD (on HD [id number]), s/p 2x renal transplants (1992, 2013)  #CKD V  -Since January and completing Rituximab pt did not show signs of renal recovery with progressive worsening of Scr due to hemodynamic injury from large variations in BP (100-190s) and repeat ISIAH 2/2 fluid overload/HTN urgency.   ->recent discharge 3/15 for fluid overload and discharged on bumex 2mg TID   -Continue reduced dose at Bumex to 2mg BID , okay to discharge from nephrology point of view.  Current electrolytes are quite stable.  -Can follow-up with repeat labs outpatient and follow-up in the clinic with us.  Echocardiogram showing severe LVH and left ventricular cavity severely dilated with the mildly elevated RVSP may benefit from aggressive diuresis.  -Left upper extremity AV fistula is good to use as per the vascular surgery.       Immunosuppression: change azathioprine to 50 mg daily, prednisone 5 mg, tacrolimus 1.5mg AM, 2mg PM   -last Tac level 4.3-continue with the current dose.    Hemodynamics:   -Blood pressures appears to have stabilized   -continue the reduced dose of Hydralazine 100mg TID   -Continue with the carvedilol, Imdur, nifedipine at current dosing      Bone mineral disease:   -Calcium and phosphorus levels are wnl  -, vit D 31  -continue calcitriol 0.25mcg daily      Anemia  Leukopenia (ANC 1800): CMV, BK and EBV negative  -IV iron 200mg x 3 given on last admission    -will start Darbe 100mcg today      Thank you for consulting

## 2024-04-06 NOTE — DISCHARGE SUMMARY
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 Patient has been experiencing low blood pressure at home secondary to medication mismanagement, likely contributing to orthostatic hypotension and symptoms of dizziness. Vitals on admission show hypotension to 106/55, initially with O2 sat at 92% on RA, placed on 2L NC. Labs unremarkable on admission except for known pancytopenia and know ESRD. CXR and CT with cardiomegaly and b/l pleural effusions. PE unremarkable. Low suspicion for infectious process given lack of symptoms, no fever on vitals, and no leukocytosis on labs. Since restarting home antihypertensive medication regimen, pt has been asymptomatic and BP appears to be stabilizing within goal. Azathioprine increased to 50mg. Hydralazine decreased to 100mg TID. Nephrology started pt on darbepoetin, received one blood transfusion. Vascular checked AVG and okay to use if needed for dialysis. Repeat TTE showed EF 54%.     Results for orders placed during the hospital encounter of 04/03/24    Transthoracic Echo (TTE) Complete    Narrative  [facility], [address] [place] [address], [place] [address]  Tel [phone] and Fax [phone]    TRANSTHORACIC ECHOCARDIOGRAM REPORT      Patient Name:      KELVIN ROB        Reading Physician:    [id number] Cali Mai MD  Study Date:        4/5/2024             Ordering Provider:    [id number] JEFFREY CORTEZ  MRN/PID:           [id number]             Fellow:  Accession#:        [id number]         Nurse:  Date of Birth/Age: 1955 / 68 years Sonographer:          Nelda GUTIERREZ  Gender:            M                    Additional Staff:  Height:            172.72 cm            Admit Date:           4/3/2024  Weight:            77.11 kg             Admission Status:     Inpatient -  Routine  BSA / BMI:         1.91 m2 / 25.85      Encounter#:           [id number]  kg/m2  Department Location:  [facility] Non  Invasive  Blood Pressure: 128 /64 mmHg    Study Type:    TRANSTHORACIC ECHO (TTE) COMPLETE  Diagnosis/ICD: Hypertension secondary to other renal disorders-I15.1;  Hypotension due to drugs-I95.2  Indication:    Hypertension  CPT Code:      Echo Complete w Full [id number]    Patient History:  Valve Disorders:   Aortic Insufficiency and Mitral Regurgitation.  Diabetes:          Yes  Pertinent History: HTN, Murmur and Hyperlipidemia. Pleural effusion, Kidney  transplant x2 1992 and 2013, Hep C, PVD.    Study Detail: The following Echo studies were performed: 2D, M-Mode, Doppler and  color flow. Technically challenging study due to prominent lung  artifact.      PHYSICIAN INTERPRETATION:  Left Ventricle: The left ventricular systolic function is normal, with an estimated ejection fraction of 54%. There are no regional wall motion abnormalities. The left ventricular cavity size is severely dilated. The left ventricular septal wall thickness is moderately increased. There is mildly increased left ventricular posterior wall thickness. There is severe left ventricular hypertrophy. Spectral Doppler shows a pseudonormal pattern of left ventricular diastolic filling. LVEDVi 115 ml/m2.  Left Atrium: The left atrium is severely dilated.  Right Ventricle: The right ventricle is  mildly enlarged. There is normal right ventricular global systolic function.  Right Atrium: The right atrium is severely dilated.  Aortic Valve: The aortic valve is trileaflet. There is mild aortic valve thickening. There is mild to moderate aortic valve regurgitation. The peak instantaneous gradient of the aortic valve is 19.9 mmHg. The mean gradient of the aortic valve is 10.1 mmHg.  Mitral Valve: The mitral valve is mildly thickened. There is mild mitral annular calcification. There is mild mitral valve regurgitation.  Tricuspid Valve: The tricuspid valve is structurally normal. There is mild tricuspid regurgitation. The Doppler estimated RVSP is mildly elevated at 46.0 mmHg.  Pulmonic Valve: The pulmonic valve is structurally normal. There is mild pulmonic valve regurgitation.  Pericardium: There is a trivial pericardial effusion.  Aorta: The aortic root is normal. The Asc Ao is 3.70 cm. There is mild dilatation of the ascending aorta.  Systemic Veins: The inferior vena cava appears dilated. There is IVC inspiratory collapse greater than 50%.  In comparison to the previous echocardiogram(s): Compared with study from 11/18/2023, the LV is now severely dilated. The degree of aortic regurgitation appears unchanged.      CONCLUSIONS:  1. Left ventricular systolic function is normal with a 54% estimated ejection fraction.  2. Spectral Doppler shows a pseudonormal pattern of left ventricular diastolic filling.  3. Left ventricular cavity size is severely dilated.  4. Moderately increased left ventricular septal thickness.  5. There is severe left ventricular hypertrophy.  6. Mild to moderate aortic valve regurgitation.  7. The left atrium is severely dilated.  8. The right atrium is severely dilated.  9. Mildly elevated RVSP.  10. Compared with study from 11/18/2023, the LV is now severely dilated. The degree of aortic regurgitation appears unchanged.    QUANTITATIVE DATA SUMMARY:  2D MEASUREMENTS:  Normal  Ranges:  LAs:           5.41 cm    (2.7-4.0cm)  RVIDd:         3.74 cm    (0.9-3.6cm)  IVSd:          1.44 cm    (0.6-1.1cm)  LVPWd:         1.42 cm    (0.6-1.1cm)  LVIDd:         5.66 cm    (3.9-5.9cm)  LVIDs:         3.91 cm  LV Mass Index: 191.0 g/m2  LV % FS        30.8 %    LA VOLUME:  Normal Ranges:  LA Vol A4C:        125.9 ml   (22+/-6mL/m2)  LA Vol A2C:        105.5 ml  LA Vol BP:         117.0 ml  LA Vol Index A4C:  66.0ml/m2  LA Vol Index A2C:  55.3 ml/m2  LA Vol Index BP:   61.3 ml/m2  LA Area A4C:       31.5 cm2  LA Area A2C:       28.4 cm2  LA Major Axis A4C: 6.7 cm  LA Major Axis A2C: 6.5 cm  LA Volume Index:   61.3 ml/m2  LA Vol A4C:        116.0 ml  LA Vol A2C:        100.0 ml    RA VOLUME BY A/L METHOD:  Normal Ranges:  RA Vol A4C:        93.6 ml    (8.3-19.5ml)  RA Vol Index A4C:  49.1 ml/m2  RA Area A4C:       25.7 cm2  RA Major Axis A4C: 6.0 cm    AORTA MEASUREMENTS:  Normal Ranges:  Ao Sinus, d: 3.40 cm (2.1-3.5cm)  Asc Ao, d:   3.70 cm (2.1-3.4cm)    LV SYSTOLIC FUNCTION BY 2D PLANIMETRY (MOD):  Normal Ranges:  EF-A4C View: 56.1 % (>=55%)  EF-A2C View: 53.5 %  EF-Biplane:  54.4 %    LV DIASTOLIC FUNCTION:  Normal Ranges:  MV Peak E:        1.35 m/s    (0.7-1.2 m/s)  MV Peak A:        0.74 m/s    (0.42-0.7 m/s)  E/A Ratio:        1.83        (1.0-2.2)  MV e'             0.05 m/s    (>8.0)  MV lateral e'     0.05 m/s  MV medial e'      0.04 m/s  MV A Dur:         136.10 msec  E/e' Ratio:       26.96       (<8.0)  PulmV Sys Hbarat:    22.90 cm/s  PulmV Yoder Bharat:   14.71 cm/s  PulmV S/D Bharat:    1.56  PulmV A Revs Bharat: 12.04 cm/s  PulmV A Revs Dur: 78.43 msec    MITRAL VALVE:  Normal Ranges:  MV DT: 172 msec (150-240msec)    MITRAL INSUFFICIENCY:  Normal Ranges:  MR VTI:  191.21 cm  MR Vmax: 556.77 cm/s  dP/dt:   1015 mmHg/s (>1200mmHg/sec)    AORTIC VALVE:  Normal Ranges:  AoV Vmax:                2.23 m/s  (<=1.7m/s)  AoV Peak P.9 mmHg (<20mmHg)  AoV Mean PG:             10.1 mmHg  (1.7-11.5mmHg)  LVOT Max Bharat:            1.26 m/s  (<=1.1m/s)  AoV VTI:                 45.13 cm  (18-25cm)  LVOT VTI:                29.06 cm  LVOT Diameter:           2.09 cm   (1.8-2.4cm)  AoV Area, VTI:           2.21 cm2  (2.5-5.5cm2)  AoV Area,Vmax:           1.94 cm2  (2.5-4.5cm2)  AoV Area, planim:        2.56 cm2  (2.5-4.5cm2)  AoV Dimensionless Index: 0.64    AORTIC INSUFFICIENCY:  AI Vmax:       3.96 m/s  AI Half-time:  294 msec  AI Decel Time: 1012 msec  AI Decel Rate: 391.15 cm/s2      RIGHT VENTRICLE:  RV Basal 5.20 cm  RV Mid   3.70 cm  RV Major 7.3 cm  TAPSE:   29.0 mm  RV s'    0.15 m/s    TRICUSPID VALVE/RVSP:  Normal Ranges:  Peak TR Velocity: 3.08 m/s  Est. RA Pressure: 8 mmHg  RV Syst Pressure: 46.0 mmHg (< 30mmHg)  IVC Diam:         2.20 cm    PULMONIC VALVE:  Normal Ranges:  PV Accel Time: 90 msec   (>120ms)  PV Max Bharat:    1.4 m/s   (0.6-0.9m/s)  PV Max P.0 mmHg  PV Mean P.2 mmHg  PV VTI:        30.40 cm  WA Vmax:       1.00 m/s  PIEDV:         1.00 m/s  PADP:          12.0 mmHg    Pulmonary Veins:  PulmV A Revs Dur: 78.43 msec  PulmV A Revs Bharat: 12.04 cm/s  PulmV Yoder Bharat:   14.71 cm/s  PulmV S/D Bharat:    1.56  PulmV Sys Bharat:    22.90 cm/s    AORTA:  Asc Ao Diam 3.72 cm      [id number] Cali Mai MD  Electronically signed on 2024 at 10:07:32 PM        ** Final **             2024     8:52 PM 2024     9:00 PM 2024     5:00 AM 2024     2:05 PM 2024     3:00 PM 2024     8:51 PM 2024     4:55 AM   Vitals   Systolic 138  128 145  165 149   Diastolic 62  64 71  74 64   Heart Rate 63  56 68  75 67   Temp 36.9 °C (98.4 °F) 36.9 °C (98.4 °F) 37.2 °C (99 °F) 37 °C (98.6 °F) 7 °C (44.6 °F) 37.2 °C (99 °F)    Resp 16  17 16  16          Pertinent Physical Exam At Time of Discharge  Physical Exam  Vitals reviewed.   Constitutional:       General: He is not in acute distress.  HENT:      Head: Normocephalic and atraumatic.   Cardiovascular:      Rate and  Rhythm: Normal rate and regular rhythm.      Heart sounds: Normal heart sounds.   Pulmonary:      Effort: Pulmonary effort is normal.      Breath sounds: Normal breath sounds.   Abdominal:      General: Bowel sounds are normal.      Palpations: Abdomen is soft.      Tenderness: There is no abdominal tenderness.   Musculoskeletal:         General: No swelling.   Skin:     General: Skin is warm and dry.   Neurological:      General: No focal deficit present.      Mental Status: He is alert and oriented to person, place, and time.   Psychiatric:         Mood and Affect: Mood normal.         Home Medications     Medication List      CHANGE how you take these medications     * azaTHIOprine 50 mg tablet; Commonly known as: Imuran; Take 0.5 tablets   (25 mg) by mouth once daily.; What changed: Another medication with the   same name was added. Make sure you understand how and when to take each.   * azaTHIOprine 50 mg tablet; Commonly known as: Imuran; Take 1 tablet   (50 mg) by mouth once daily.; What changed: You were already taking a   medication with the same name, and this prescription was added. Make sure   you understand how and when to take each.   bumetanide 2 mg tablet; Commonly known as: Bumex; Take 1 tablet (2 mg)   by mouth 2 times a day.; What changed: when to take this   hydrALAZINE 100 mg tablet; Commonly known as: Apresoline; Take 1 tablet   (100 mg) by mouth every 8 hours.; What changed: Another medication with   the same name was removed. Continue taking this medication, and follow the   directions you see here.  * This list has 2 medication(s) that are the same as other medications   prescribed for you. Read the directions carefully, and ask your doctor or   other care provider to review them with you.     CONTINUE taking these medications     acetaminophen 325 mg tablet; Commonly known as: Tylenol; Take 3 tablets   (975 mg) by mouth every 6 hours if needed (pain).   aspirin 81 mg chewable tablet; Chew 1  "tablet (81 mg) once daily.   calcitriol 0.25 mcg capsule; Commonly known as: Rocaltrol; Take 1   capsule (0.25 mcg) by mouth once daily. Do not start before December 22, 2023.   carvedilol 12.5 mg tablet; Commonly known as: Coreg; TAKE THREE (3)   TABLETS BY MOUTH TWICE DAILY   chlorhexidine 4 % external liquid; Commonly known as: Hibiclens; Apply   topically once daily as needed for wound care.   cinacalcet 30 mg tablet; Commonly known as: Sensipar; Take 1 tablet (30   mg) by mouth once daily.   docusate sodium 100 mg capsule; Commonly known as: Colace   Easy Touch Alcohol Prep Pads pads, medicated; Generic drug: alcohol   swabs   insulin lispro 100 unit/mL injection; Commonly known as: HumaLOG   isosorbide mononitrate ER 60 mg 24 hr tablet; Commonly known as: Imdur;   Take 1 tablet (60 mg) by mouth once daily. Do not crush or chew. Do not   start before January 22, 2024.   Lantus Solostar U-100 Insulin 100 unit/mL (3 mL) pen; Generic drug:   insulin glargine; Inject 20 Units under the skin once daily in the   morning. Take as directed per insulin instructions.   Lokelma 5 gram packet; Generic drug: sodium zirconium cyclosilicate;   Take 5 g by mouth once daily.   multivitamin tablet   NIFEdipine ER 60 mg 24 hr tablet; Commonly known as: Adalat CC; Take 1   tablet (60 mg) by mouth 2 times a day. Do not crush, chew, or split.   ondansetron 4 mg tablet; Commonly known as: Zofran   pantoprazole 40 mg EC tablet; Commonly known as: ProtoNix; Take 1 tablet   (40 mg) by mouth once daily in the morning. Take before meals. Do not   crush, chew, or split. Do not start before January 22, 2024.   pen needle, diabetic 32 gauge x 5/32\" needle; Commonly known as:   TechLITE Pen Needle; 1 each 4 times a day.   permethrin 5 % cream; Commonly known as: Elimite; Apply topically once   daily. Use on scalp once. Leave on overnight, wash off in the morning.   Repeat 1 week later.   pravastatin 10 mg tablet; Commonly known as: " Pravachol; Take 1 tablet   (10 mg) by mouth once daily at bedtime.   predniSONE 5 mg tablet; Commonly known as: Deltasone   sulfamethoxazole-trimethoprim 400-80 mg tablet; Commonly known as:   Bactrim; Take 1 tablet by mouth 2 times a day.   * tacrolimus 0.5 mg capsule; Commonly known as: Prograf; Take 1.5 mg by   mouth once daily in the morning AND 2 mg once daily at bedtime.   * tacrolimus 0.1 % ointment; Commonly known as: Protopic; Apply   topically 2 times a day.   * Unilet Super Thin Lancets 30 gauge misc; Generic drug: lancets; USE TO   TEST BLOOD SUGAR THREE TIMES A DAY   * Unilet Super Thin Lancets 30 gauge misc; Generic drug: lancets; 1 each   3 times a day.  * This list has 4 medication(s) that are the same as other medications   prescribed for you. Read the directions carefully, and ask your doctor or   other care provider to review them with you.     ASK your doctor about these medications     cholecalciferol 25 MCG (1000 UT) tablet; Commonly known as: Vitamin D-3;   Take 1 tablet (1,000 Units) by mouth once daily. Do not start before   December 22, 2023.; Ask about: Should I take this medication?       Outpatient Follow-Up  Future Appointments   Date Time Provider Department Center   4/16/2024 10:20 AM TXP KIDNEY PROVIDER [id number] [facility]   4/19/2024 11:15 AM Bryanna Jc MD [id number] [place]   4/22/2024 10:40 AM Melia Qiu PA-C [id number] [facility]   4/30/2024  2:00 PM [facility] [place] [place] CAIC MRI 1 [id number] [facility] [place] [place] Rad Cent   5/13/2024  9:45 AM Daxa Cote DPM [id number] [facility]   5/15/2024 10:30 AM ANÍBAL Armas-CNP, JER [id number] [facility]   10/2/2024  3:00 PM ANÍBAL Scott-CNP [id number] [facility]   10/16/2024 10:30 AM Vinicius Araiza MD [id number] [facility]       Micaela Aguilar MD   never used

## 2024-04-07 LAB
BLOOD EXPIRATION DATE: NORMAL
DISPENSE STATUS: NORMAL
PRODUCT BLOOD TYPE: 5100
PRODUCT CODE: NORMAL
UNIT ABO: NORMAL
UNIT NUMBER: NORMAL
UNIT RH: NORMAL
UNIT VOLUME: 350
XM INTEP: NORMAL

## 2024-04-08 ENCOUNTER — PHARMACY VISIT (OUTPATIENT)
Dept: PHARMACY | Facility: CLINIC | Age: 69
End: 2024-04-08
Payer: MEDICAID

## 2024-04-08 PROCEDURE — RXMED WILLOW AMBULATORY MEDICATION CHARGE

## 2024-04-09 ENCOUNTER — PHARMACY VISIT (OUTPATIENT)
Dept: PHARMACY | Facility: CLINIC | Age: 69
End: 2024-04-09
Payer: MEDICAID

## 2024-04-09 PROCEDURE — RXMED WILLOW AMBULATORY MEDICATION CHARGE

## 2024-04-09 RX ORDER — PANTOPRAZOLE SODIUM 40 MG/1
40 TABLET, DELAYED RELEASE ORAL
Qty: 30 TABLET | Refills: 11 | Status: SHIPPED | OUTPATIENT
Start: 2024-04-09 | End: 2025-04-09

## 2024-04-13 PROCEDURE — RXMED WILLOW AMBULATORY MEDICATION CHARGE

## 2024-04-15 ENCOUNTER — LAB (OUTPATIENT)
Dept: LAB | Facility: LAB | Age: 69
End: 2024-04-15
Payer: COMMERCIAL

## 2024-04-15 DIAGNOSIS — E11.9 DIABETES MELLITUS TYPE 2 WITHOUT RETINOPATHY (MULTI): ICD-10-CM

## 2024-04-15 DIAGNOSIS — N18.4 ACUTE RENAL FAILURE WITH ACUTE RENAL CORTICAL NECROSIS SUPERIMPOSED ON STAGE 4 CHRONIC KIDNEY DISEASE (MULTI): ICD-10-CM

## 2024-04-15 DIAGNOSIS — N17.1 ACUTE RENAL FAILURE WITH ACUTE RENAL CORTICAL NECROSIS SUPERIMPOSED ON STAGE 4 CHRONIC KIDNEY DISEASE (MULTI): ICD-10-CM

## 2024-04-15 DIAGNOSIS — G93.2 INTRACRANIAL HYPERTENSION: ICD-10-CM

## 2024-04-15 DIAGNOSIS — Z94.0 KIDNEY REPLACED BY TRANSPLANT (HHS-HCC): ICD-10-CM

## 2024-04-15 DIAGNOSIS — N05.1 FOCAL AND SEGMENTAL PROLIFERATIVE GLOMERULONEPHRITIS: ICD-10-CM

## 2024-04-15 DIAGNOSIS — Z94.0 KIDNEY TRANSPLANTED (HHS-HCC): ICD-10-CM

## 2024-04-15 LAB
ALBUMIN SERPL BCP-MCNC: 3.4 G/DL (ref 3.4–5)
ANION GAP SERPL CALC-SCNC: 11 MMOL/L (ref 10–20)
APPEARANCE UR: CLEAR
BASOPHILS # BLD AUTO: 0.01 X10*3/UL (ref 0–0.1)
BASOPHILS # BLD AUTO: 0.01 X10*3/UL (ref 0–0.1)
BASOPHILS NFR BLD AUTO: 0.4 %
BASOPHILS NFR BLD AUTO: 0.4 %
BILIRUB UR STRIP.AUTO-MCNC: NEGATIVE MG/DL
BUN SERPL-MCNC: 60 MG/DL (ref 6–23)
CALCIUM SERPL-MCNC: 10 MG/DL (ref 8.6–10.6)
CHLORIDE SERPL-SCNC: 109 MMOL/L (ref 98–107)
CHLORIDE UR-SCNC: 125 MMOL/L
CHLORIDE/CREATININE (MMOL/G) IN URINE: 275 MMOL/G CREAT (ref 23–275)
CO2 SERPL-SCNC: 27 MMOL/L (ref 21–32)
COLOR UR: COLORLESS
CREAT SERPL-MCNC: 5.72 MG/DL (ref 0.5–1.3)
CREAT UR-MCNC: 45.4 MG/DL (ref 20–370)
CREAT UR-MCNC: 45.5 MG/DL (ref 20–370)
EGFRCR SERPLBLD CKD-EPI 2021: 10 ML/MIN/1.73M*2
EOSINOPHIL # BLD AUTO: 0.08 X10*3/UL (ref 0–0.7)
EOSINOPHIL # BLD AUTO: 0.09 X10*3/UL (ref 0–0.7)
EOSINOPHIL NFR BLD AUTO: 3 %
EOSINOPHIL NFR BLD AUTO: 3.2 %
ERYTHROCYTE [DISTWIDTH] IN BLOOD BY AUTOMATED COUNT: 15.9 % (ref 11.5–14.5)
ERYTHROCYTE [DISTWIDTH] IN BLOOD BY AUTOMATED COUNT: 16 % (ref 11.5–14.5)
GLUCOSE SERPL-MCNC: 81 MG/DL (ref 74–99)
GLUCOSE UR STRIP.AUTO-MCNC: NORMAL MG/DL
HCT VFR BLD AUTO: 26.9 % (ref 41–52)
HCT VFR BLD AUTO: 27.1 % (ref 41–52)
HGB BLD-MCNC: 8.3 G/DL (ref 13.5–17.5)
HGB BLD-MCNC: 8.4 G/DL (ref 13.5–17.5)
HOLD SPECIMEN: NORMAL
IMM GRANULOCYTES # BLD AUTO: 0.03 X10*3/UL (ref 0–0.7)
IMM GRANULOCYTES # BLD AUTO: 0.07 X10*3/UL (ref 0–0.7)
IMM GRANULOCYTES NFR BLD AUTO: 1.1 % (ref 0–0.9)
IMM GRANULOCYTES NFR BLD AUTO: 2.5 % (ref 0–0.9)
KETONES UR STRIP.AUTO-MCNC: NEGATIVE MG/DL
LEUKOCYTE ESTERASE UR QL STRIP.AUTO: NEGATIVE
LYMPHOCYTES # BLD AUTO: 0.65 X10*3/UL (ref 1.2–4.8)
LYMPHOCYTES # BLD AUTO: 0.75 X10*3/UL (ref 1.2–4.8)
LYMPHOCYTES NFR BLD AUTO: 24.6 %
LYMPHOCYTES NFR BLD AUTO: 26.6 %
MCH RBC QN AUTO: 29.5 PG (ref 26–34)
MCH RBC QN AUTO: 29.6 PG (ref 26–34)
MCHC RBC AUTO-ENTMCNC: 30.9 G/DL (ref 32–36)
MCHC RBC AUTO-ENTMCNC: 31 G/DL (ref 32–36)
MCV RBC AUTO: 95 FL (ref 80–100)
MCV RBC AUTO: 96 FL (ref 80–100)
MICROALBUMIN UR-MCNC: 873.9 MG/L
MICROALBUMIN/CREAT UR: 1920.7 UG/MG CREAT
MONOCYTES # BLD AUTO: 0.37 X10*3/UL (ref 0.1–1)
MONOCYTES # BLD AUTO: 0.38 X10*3/UL (ref 0.1–1)
MONOCYTES NFR BLD AUTO: 13.1 %
MONOCYTES NFR BLD AUTO: 14.4 %
MUCOUS THREADS #/AREA URNS AUTO: NORMAL /LPF
NEUTROPHILS # BLD AUTO: 1.49 X10*3/UL (ref 1.2–7.7)
NEUTROPHILS # BLD AUTO: 1.53 X10*3/UL (ref 1.2–7.7)
NEUTROPHILS NFR BLD AUTO: 54.2 %
NEUTROPHILS NFR BLD AUTO: 56.5 %
NITRITE UR QL STRIP.AUTO: NEGATIVE
NRBC BLD-RTO: 0 /100 WBCS (ref 0–0)
NRBC BLD-RTO: 0 /100 WBCS (ref 0–0)
PH UR STRIP.AUTO: 7.5 [PH]
PHOSPHATE SERPL-MCNC: 2.9 MG/DL (ref 2.5–4.9)
PLATELET # BLD AUTO: 100 X10*3/UL (ref 150–450)
PLATELET # BLD AUTO: 103 X10*3/UL (ref 150–450)
POTASSIUM SERPL-SCNC: 5.2 MMOL/L (ref 3.5–5.3)
POTASSIUM UR-SCNC: 25 MMOL/L
POTASSIUM/CREAT UR-RTO: 55 MMOL/G CREAT
PROT UR STRIP.AUTO-MCNC: ABNORMAL MG/DL
RBC # BLD AUTO: 2.8 X10*6/UL (ref 4.5–5.9)
RBC # BLD AUTO: 2.85 X10*6/UL (ref 4.5–5.9)
RBC # UR STRIP.AUTO: NEGATIVE /UL
RBC #/AREA URNS AUTO: NORMAL /HPF
SODIUM SERPL-SCNC: 142 MMOL/L (ref 136–145)
SODIUM UR-SCNC: 126 MMOL/L
SODIUM/CREAT UR-RTO: 278 MMOL/G CREAT
SP GR UR STRIP.AUTO: 1.01
TACROLIMUS BLD-MCNC: 5.4 NG/ML
UROBILINOGEN UR STRIP.AUTO-MCNC: NORMAL MG/DL
WBC # BLD AUTO: 2.6 X10*3/UL (ref 4.4–11.3)
WBC # BLD AUTO: 2.8 X10*3/UL (ref 4.4–11.3)
WBC #/AREA URNS AUTO: NORMAL /HPF

## 2024-04-15 PROCEDURE — 81001 URINALYSIS AUTO W/SCOPE: CPT

## 2024-04-15 PROCEDURE — 85025 COMPLETE CBC W/AUTO DIFF WBC: CPT

## 2024-04-15 PROCEDURE — 84133 ASSAY OF URINE POTASSIUM: CPT

## 2024-04-15 PROCEDURE — 84300 ASSAY OF URINE SODIUM: CPT

## 2024-04-15 PROCEDURE — 36415 COLL VENOUS BLD VENIPUNCTURE: CPT

## 2024-04-15 PROCEDURE — 82043 UR ALBUMIN QUANTITATIVE: CPT

## 2024-04-15 PROCEDURE — 80069 RENAL FUNCTION PANEL: CPT

## 2024-04-15 PROCEDURE — 80197 ASSAY OF TACROLIMUS: CPT

## 2024-04-15 PROCEDURE — 88184 FLOWCYTOMETRY/ TC 1 MARKER: CPT

## 2024-04-15 PROCEDURE — 82436 ASSAY OF URINE CHLORIDE: CPT

## 2024-04-15 PROCEDURE — 88185 FLOWCYTOMETRY/TC ADD-ON: CPT

## 2024-04-15 PROCEDURE — 82570 ASSAY OF URINE CREATININE: CPT

## 2024-04-15 PROCEDURE — 88187 FLOWCYTOMETRY/READ 2-8: CPT | Performed by: HOSPITALIST

## 2024-04-16 ENCOUNTER — PHARMACY VISIT (OUTPATIENT)
Dept: PHARMACY | Facility: CLINIC | Age: 69
End: 2024-04-16
Payer: MEDICAID

## 2024-04-16 ENCOUNTER — OFFICE VISIT (OUTPATIENT)
Dept: TRANSPLANT | Facility: HOSPITAL | Age: 69
End: 2024-04-16
Payer: COMMERCIAL

## 2024-04-16 VITALS
HEART RATE: 60 BPM | DIASTOLIC BLOOD PRESSURE: 74 MMHG | WEIGHT: 168.4 LBS | OXYGEN SATURATION: 100 % | SYSTOLIC BLOOD PRESSURE: 153 MMHG | TEMPERATURE: 97.1 F | BODY MASS INDEX: 25.61 KG/M2

## 2024-04-16 DIAGNOSIS — Z92.25 PERSONAL HISTORY OF IMMUNOSUPRESSION THERAPY: ICD-10-CM

## 2024-04-16 DIAGNOSIS — D47.2 MGUS (MONOCLONAL GAMMOPATHY OF UNKNOWN SIGNIFICANCE): ICD-10-CM

## 2024-04-16 DIAGNOSIS — I15.9 SECONDARY HYPERTENSION: ICD-10-CM

## 2024-04-16 DIAGNOSIS — Z94.0 KIDNEY TRANSPLANTED (HHS-HCC): ICD-10-CM

## 2024-04-16 DIAGNOSIS — Z94.0 KIDNEY REPLACED BY TRANSPLANT (HHS-HCC): Primary | ICD-10-CM

## 2024-04-16 PROCEDURE — 99214 OFFICE O/P EST MOD 30 MIN: CPT

## 2024-04-16 PROCEDURE — 1160F RVW MEDS BY RX/DR IN RCRD: CPT | Performed by: HOSPITALIST

## 2024-04-16 PROCEDURE — RXMED WILLOW AMBULATORY MEDICATION CHARGE

## 2024-04-16 PROCEDURE — 3077F SYST BP >= 140 MM HG: CPT | Performed by: HOSPITALIST

## 2024-04-16 PROCEDURE — 3008F BODY MASS INDEX DOCD: CPT | Performed by: HOSPITALIST

## 2024-04-16 PROCEDURE — 1111F DSCHRG MED/CURRENT MED MERGE: CPT | Performed by: HOSPITALIST

## 2024-04-16 PROCEDURE — 3078F DIAST BP <80 MM HG: CPT | Performed by: HOSPITALIST

## 2024-04-16 PROCEDURE — 1159F MED LIST DOCD IN RCRD: CPT | Performed by: HOSPITALIST

## 2024-04-16 PROCEDURE — 3051F HG A1C>EQUAL 7.0%<8.0%: CPT | Performed by: HOSPITALIST

## 2024-04-16 PROCEDURE — 3062F POS MACROALBUMINURIA REV: CPT | Performed by: HOSPITALIST

## 2024-04-16 RX ORDER — ISOSORBIDE MONONITRATE 60 MG/1
60 TABLET, EXTENDED RELEASE ORAL DAILY
Qty: 30 TABLET | Refills: 1 | Status: SHIPPED | OUTPATIENT
Start: 2024-04-16 | End: 2024-06-24

## 2024-04-16 ASSESSMENT — ENCOUNTER SYMPTOMS
HEADACHES: 0
SHORTNESS OF BREATH: 0
NERVOUS/ANXIOUS: 0
COUGH: 0
PALPITATIONS: 0
DIARRHEA: 0
CONFUSION: 0
VOMITING: 0
ABDOMINAL DISTENTION: 0
BACK PAIN: 0
FREQUENCY: 0
HEMATURIA: 0
NAUSEA: 0
CHEST TIGHTNESS: 0

## 2024-04-16 NOTE — PROGRESS NOTES
Manolo Bashir  68 y.o. with a history of ESRD s/p prior transplant in 1988 lasted until 1992 and retransplanted in 1994 which which is currently functioning.    -Other history include MGUS with IgG and IgA lambda, diabetes, hypertension, prostate cancer status post radical prostatectomy, baseline urinary incontinence, HCV s/p treatment PCR negative as of 10/20/2023.  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  Patient received 2 doses of rituximab as of 1/7/2024.  -Had several hospital admissions because of fluid overload, hypertensive emergency and recently had another hospital admission for dizziness and episodes of hypotension.    Interim history: Denied any complaints on today's visit.  Blood pressure log examined which showing high pressures in the morning and evenings and been optimal after taking the medicines.  Ran out of Imdur recently which I am going to refill and refer to cardiology also.        Review of Systems   Respiratory:  Negative for cough, chest tightness and shortness of breath.    Cardiovascular:  Negative for chest pain, palpitations and leg swelling.   Gastrointestinal:  Negative for abdominal distention, diarrhea, nausea and vomiting.   Genitourinary:  Negative for frequency, hematuria and urgency.   Musculoskeletal:  Negative for back pain.   Neurological:  Negative for headaches.   Psychiatric/Behavioral:  Negative for confusion. The patient is not nervous/anxious.         Objective:  Visit Vitals  /74   Pulse 60   Temp 36.2 °C (97.1 °F)   Wt 76.4 kg (168 lb 6.4 oz)   SpO2 100%   BMI 25.61 kg/m²   Smoking Status Never   BSA 1.91 m²      Physical Exam  HENT:      Head: Normocephalic and atraumatic.      Nose: Nose normal.      Mouth/Throat:      Mouth: Mucous membranes are moist.   Eyes:      Extraocular Movements:  Extraocular movements intact.      Pupils: Pupils are equal, round, and reactive to light.   Cardiovascular:      Rate and Rhythm: Normal rate.      Pulses: Normal pulses.      Heart sounds: Normal heart sounds.   Pulmonary:      Effort: Pulmonary effort is normal.   Abdominal:      Palpations: Abdomen is soft.      Tenderness: There is no abdominal tenderness. There is no guarding.   Musculoskeletal:         General: Normal range of motion.      Cervical back: Normal range of motion.   Skin:     General: Skin is warm.   Neurological:      General: No focal deficit present.      Mental Status: Mental status is at baseline.   Psychiatric:         Mood and Affect: Mood normal.            Current Outpatient Medications:     acetaminophen (Tylenol) 325 mg tablet, Take 3 tablets (975 mg) by mouth every 6 hours if needed (pain)., Disp: 30 tablet, Rfl: 11    aspirin 81 mg chewable tablet, Chew 1 tablet (81 mg) once daily., Disp: 30 tablet, Rfl: 11    azaTHIOprine (Imuran) 50 mg tablet, Take 1 tablet (50 mg) by mouth once daily., Disp: 30 tablet, Rfl: 0    bumetanide (Bumex) 2 mg tablet, Take 1 tablet (2 mg) by mouth 2 times a day., Disp: , Rfl:     calcitriol (Rocaltrol) 0.25 mcg capsule, Take 1 capsule (0.25 mcg) by mouth once daily. Do not start before December 22, 2023., Disp: 30 capsule, Rfl: 2    carvedilol (Coreg) 12.5 mg tablet, TAKE THREE (3) TABLETS BY MOUTH TWICE DAILY, Disp: 180 tablet, Rfl: 10    cinacalcet (Sensipar) 30 mg tablet, Take 1 tablet (30 mg) by mouth once daily., Disp: 30 tablet, Rfl: 11    docusate sodium (Colace) 100 mg capsule, Take 1 capsule (100 mg) by mouth once daily as needed., Disp: , Rfl:     Easy Touch Alcohol Prep Pads pads, medicated, , Disp: , Rfl:     insulin glargine (Lantus Solostar U-100 Insulin) 100 unit/mL (3 mL) pen, Inject 20 Units under the skin once daily in the morning. Take as directed per insulin instructions., Disp: 15 mL, Rfl: 3    insulin lispro (HumaLOG) 100 unit/mL  "injection, Inject under the skin 3 times a day with meals. 100-199 2 units 200-299 4 units 300-399 6 units, Disp: , Rfl:     lancets (Unilet Super Thin Lancets) 30 gauge misc, USE TO TEST BLOOD SUGAR THREE TIMES A DAY, Disp: 300 each, Rfl: 0    multivitamin tablet, Take 1 tablet by mouth once daily., Disp: , Rfl:     NIFEdipine ER (Adalat CC) 60 mg 24 hr tablet, Take 1 tablet (60 mg) by mouth 2 times a day. Do not crush, chew, or split., Disp: 60 tablet, Rfl: 1    ondansetron (Zofran) 4 mg tablet, Take 1 tablet (4 mg) by mouth every 8 hours if needed for nausea or vomiting., Disp: , Rfl:     pantoprazole (ProtoNix) 40 mg EC tablet, Take 1 tablet (40 mg) by mouth once daily in the morning. Take before meals. Do not crush, chew, or split. Do not start before January 22, 2024., Disp: 30 tablet, Rfl: 11    pen needle, diabetic (TechLITE Pen Needle) 32 gauge x 5/32\" needle, 1 each 4 times a day., Disp: 400 each, Rfl: 3    permethrin (Elimite) 5 % cream, Apply topically once daily. Use on scalp once. Leave on overnight, wash off in the morning. Repeat 1 week later., Disp: 60 g, Rfl: 0    pravastatin (Pravachol) 10 mg tablet, Take 1 tablet (10 mg) by mouth once daily at bedtime., Disp: 30 tablet, Rfl: 1    predniSONE (Deltasone) 5 mg tablet, Take 1 tablet (5 mg) by mouth once daily., Disp: , Rfl:     sodium zirconium cyclosilicate (Lokelma) 5 gram packet, Take 5 g by mouth once daily., Disp: 30 packet, Rfl: 11    sulfamethoxazole-trimethoprim (Bactrim) 400-80 mg tablet, Take 1 tablet by mouth 2 times a day., Disp: 60 tablet, Rfl: 5    tacrolimus (Prograf) 0.5 mg capsule, Take 1.5 mg by mouth once daily in the morning AND 2 mg once daily at bedtime., Disp: 210 capsule, Rfl: 2    tacrolimus (Protopic) 0.1 % ointment, Apply topically 2 times a day., Disp: 30 g, Rfl: 0    Unilet Super Thin Lancets 30 gauge misc, 1 each 3 times a day., Disp: 100 each, Rfl: 3    hydrALAZINE (Apresoline) 100 mg tablet, Take 1 tablet (100 mg) by " mouth every 8 hours., Disp: 90 tablet, Rfl: 0    isosorbide mononitrate ER (Imdur) 60 mg 24 hr tablet, Take 1 tablet (60 mg) by mouth once daily. Do not crush or chew. Do not start before January 22, 2024., Disp: 30 tablet, Rfl: 1     [unfilled]     No images are attached to the encounter.     Assessment and Plan :Manolo Bashir 68 y.o. with a history of ESRD s/p prior transplant in 1988 lasted until 1992 and retransplanted in 1994 which which is currently functioning.    -Other history include MGUS with IgG and IgA lambda, diabetes, hypertension, prostate cancer status post radical prostatectomy, baseline urinary incontinence, HCV s/p treatment PCR negative as of 10/20/2023.  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  Patient received 2 doses of rituximab as of 1/7/2024.  -Had several hospital admissions because of fluid overload, hypertensive emergency and recently had another hospital admission for dizziness and episodes of hypotension.    Interim history: Denied any complaints on today's visit.  Blood pressure log examined which showing high pressures in the morning and evenings and been optimal after taking the medicines.  Ran out of Imdur recently which I am going to refill and refer to cardiology also.    Allograft unction: Impaired graft function with creatinine around 5.7, mild hyperkalemia noticed currently on Lokelma 5 g recommended to hold Bactrim for now and will follow-up with the potassium levels.  Denied any uremic signs and symptoms no acute indication for dialysis at this time.    Immunosuppression: Continue with the tacrolimus 1. 5 in the morning and 2 mg at bedtime, prednisone 5 mg, Imuran 50 mg daily.  Recent tacrolimus levels are 5. 4 AIM levels are 5-8.    Hemodynamics: Blood pressures currently mildly elevated  restart Imdur, continue with the Bumex 2 mg twice daily, carvedilol 37.5 twice daily, nifedipine 60 twice daily, hydralazine 100 mg 3 times daily, Imdur 60 mg daily.    Anemia/leukopenia: Absolute neutrophil count is within normal range will check CMV and EBV for leukopenia.  Anemia will prescribe Epogen and also consider iron studies B12 and folate.      Bone mineral disease: Calcium and phosphorus levels are optimal continue with the Sensipar as well as continue calcitriol every other day.    General health care: Recommended annual dermatology visits and routine screening.    Labs every week and follow-up in a month      Dina Benoit MD    Notes created by Antoine -Please excuse the Typos .

## 2024-04-16 NOTE — PATIENT INSTRUCTIONS
Refill one time Isosorbide referral to cardiology  Epogent 10 000 weekly was sent  Iron studies were ordered   Hold Bactrim for 2 weeks   Check CMV, EBV   Labs weekly   Monthly  follow up

## 2024-04-18 ENCOUNTER — PHARMACY VISIT (OUTPATIENT)
Dept: PHARMACY | Facility: CLINIC | Age: 69
End: 2024-04-18
Payer: MEDICAID

## 2024-04-18 ENCOUNTER — OFFICE VISIT (OUTPATIENT)
Dept: SURGERY | Facility: CLINIC | Age: 69
End: 2024-04-18
Payer: COMMERCIAL

## 2024-04-18 VITALS
BODY MASS INDEX: 25.16 KG/M2 | HEART RATE: 65 BPM | DIASTOLIC BLOOD PRESSURE: 87 MMHG | SYSTOLIC BLOOD PRESSURE: 168 MMHG | TEMPERATURE: 98.6 F | WEIGHT: 166 LBS | HEIGHT: 68 IN

## 2024-04-18 DIAGNOSIS — N18.6 ESRD (END STAGE RENAL DISEASE) (MULTI): Primary | ICD-10-CM

## 2024-04-18 LAB
CD19 CELLS # BLD: 0 X10E9/L
CD19 CELLS NFR BLD: 0.2 %
FLOW CYTOMETRY SPECIALIST REVIEW: ABNORMAL
LYMPHOCYTES # SPEC AUTO: 0.75 X10*3/UL
PATH REVIEW, B CELL PHENOTYPING, EXTENDED: ABNORMAL

## 2024-04-18 PROCEDURE — 3008F BODY MASS INDEX DOCD: CPT | Performed by: TRANSPLANT SURGERY

## 2024-04-18 PROCEDURE — 1159F MED LIST DOCD IN RCRD: CPT | Performed by: TRANSPLANT SURGERY

## 2024-04-18 PROCEDURE — 3062F POS MACROALBUMINURIA REV: CPT | Performed by: TRANSPLANT SURGERY

## 2024-04-18 PROCEDURE — 1036F TOBACCO NON-USER: CPT | Performed by: TRANSPLANT SURGERY

## 2024-04-18 PROCEDURE — 3051F HG A1C>EQUAL 7.0%<8.0%: CPT | Performed by: TRANSPLANT SURGERY

## 2024-04-18 PROCEDURE — 99024 POSTOP FOLLOW-UP VISIT: CPT | Performed by: TRANSPLANT SURGERY

## 2024-04-18 PROCEDURE — 3079F DIAST BP 80-89 MM HG: CPT | Performed by: TRANSPLANT SURGERY

## 2024-04-18 PROCEDURE — 3077F SYST BP >= 140 MM HG: CPT | Performed by: TRANSPLANT SURGERY

## 2024-04-18 PROCEDURE — 1111F DSCHRG MED/CURRENT MED MERGE: CPT | Performed by: TRANSPLANT SURGERY

## 2024-04-18 PROCEDURE — 1125F AMNT PAIN NOTED PAIN PRSNT: CPT | Performed by: TRANSPLANT SURGERY

## 2024-04-18 PROCEDURE — 1160F RVW MEDS BY RX/DR IN RCRD: CPT | Performed by: TRANSPLANT SURGERY

## 2024-04-18 ASSESSMENT — ENCOUNTER SYMPTOMS
COUGH: 0
CONFUSION: 0
DIZZINESS: 0
WEAKNESS: 0
DIARRHEA: 0
FREQUENCY: 0
ABDOMINAL DISTENTION: 0
FEVER: 0
ADENOPATHY: 0
LIGHT-HEADEDNESS: 0
HEMATURIA: 0
COLOR CHANGE: 0
ABDOMINAL PAIN: 0
HALLUCINATIONS: 0
SHORTNESS OF BREATH: 0
CHILLS: 0
CONSTIPATION: 0
EYES NEGATIVE: 1
DYSURIA: 0
AGITATION: 0
ARTHRALGIAS: 0

## 2024-04-18 ASSESSMENT — PAIN SCALES - GENERAL: PAINLEVEL: 10-WORST PAIN EVER

## 2024-04-18 NOTE — PROGRESS NOTES
Subjective   Patient ID: Manolo Bashir is a 68 y.o. male who presents for post op appt  HPI    S/p LUE AVG creation 3/21/24  Here for follow up visit    He was recently hospitalized for hypotension and is feeling better now. He is close of needing dialysis    Review of Systems   Constitutional:  Negative for chills and fever.   HENT: Negative.  Negative for congestion.    Eyes: Negative.    Respiratory:  Negative for cough and shortness of breath.    Cardiovascular:  Negative for chest pain.   Gastrointestinal:  Negative for abdominal distention, abdominal pain, constipation and diarrhea.   Endocrine: Negative for cold intolerance and heat intolerance.   Genitourinary:  Negative for dysuria, frequency, hematuria and urgency.   Musculoskeletal:  Negative for arthralgias.   Skin:  Negative for color change.   Allergic/Immunologic: Negative for environmental allergies.   Neurological:  Negative for dizziness, weakness and light-headedness.   Hematological:  Negative for adenopathy.   Psychiatric/Behavioral:  Negative for agitation, confusion and hallucinations.        Objective   Vitals:    04/18/24 1215   BP: 168/87   Pulse: 65   Temp: 37 °C (98.6 °F)       Physical Exam    Good, palpable thrill to L AVG  Previously nonfunctional aneurysmal AVF still in place      Assessment/Plan       Patient given clearance form to use AVG for when he begins HD.

## 2024-04-19 ENCOUNTER — OFFICE VISIT (OUTPATIENT)
Dept: DERMATOLOGY | Facility: CLINIC | Age: 69
End: 2024-04-19
Payer: COMMERCIAL

## 2024-04-19 ENCOUNTER — APPOINTMENT (OUTPATIENT)
Dept: DERMATOLOGY | Facility: CLINIC | Age: 69
End: 2024-04-19
Payer: COMMERCIAL

## 2024-04-19 DIAGNOSIS — L91.8 SKIN TAG: ICD-10-CM

## 2024-04-19 PROCEDURE — 3008F BODY MASS INDEX DOCD: CPT | Performed by: STUDENT IN AN ORGANIZED HEALTH CARE EDUCATION/TRAINING PROGRAM

## 2024-04-19 PROCEDURE — 3051F HG A1C>EQUAL 7.0%<8.0%: CPT | Performed by: STUDENT IN AN ORGANIZED HEALTH CARE EDUCATION/TRAINING PROGRAM

## 2024-04-19 PROCEDURE — 1111F DSCHRG MED/CURRENT MED MERGE: CPT | Performed by: STUDENT IN AN ORGANIZED HEALTH CARE EDUCATION/TRAINING PROGRAM

## 2024-04-19 PROCEDURE — 11200 RMVL SKIN TAGS UP TO&INC 15: CPT | Performed by: STUDENT IN AN ORGANIZED HEALTH CARE EDUCATION/TRAINING PROGRAM

## 2024-04-19 PROCEDURE — 1159F MED LIST DOCD IN RCRD: CPT | Performed by: STUDENT IN AN ORGANIZED HEALTH CARE EDUCATION/TRAINING PROGRAM

## 2024-04-19 PROCEDURE — 1160F RVW MEDS BY RX/DR IN RCRD: CPT | Performed by: STUDENT IN AN ORGANIZED HEALTH CARE EDUCATION/TRAINING PROGRAM

## 2024-04-19 PROCEDURE — 3062F POS MACROALBUMINURIA REV: CPT | Performed by: STUDENT IN AN ORGANIZED HEALTH CARE EDUCATION/TRAINING PROGRAM

## 2024-04-19 NOTE — PROGRESS NOTES
Subjective     Manolo Bashir is a 68 y.o. male who presents for the following: Suspicious Skin Lesion (On face).     Review of Systems:  No other skin or systemic complaints other than what is documented elsewhere in the note.    The following portions of the chart were reviewed this encounter and updated as appropriate:          Skin Cancer History  No skin cancer on file.      Specialty Problems          Dermatology Problems    Rash        Objective   Well appearing patient in no apparent distress; mood and affect are within normal limits.    A focused skin examination was performed. All findings within normal limits unless otherwise noted below.    Assessment/Plan   1. Skin tag (3)  Left Malar Cheek, Left Zygomatic Area, Right Malar Cheek  The pedunculated papules    Prepped with isopropyl alcohol  Numbed with 0.2 ml of lidocaine with epinephrine  Snip excision performed x3 with scissors  Bleeding controlled with electrocautery  Patient tolerated procedure well    Related Procedures  Prior Authorization for Skin Excision - Skin Tag Removal

## 2024-04-22 ENCOUNTER — OFFICE VISIT (OUTPATIENT)
Dept: GASTROENTEROLOGY | Facility: HOSPITAL | Age: 69
End: 2024-04-22
Payer: COMMERCIAL

## 2024-04-22 VITALS
HEART RATE: 63 BPM | SYSTOLIC BLOOD PRESSURE: 139 MMHG | WEIGHT: 171 LBS | OXYGEN SATURATION: 99 % | DIASTOLIC BLOOD PRESSURE: 75 MMHG | BODY MASS INDEX: 25.91 KG/M2 | HEIGHT: 68 IN | TEMPERATURE: 97.2 F

## 2024-04-22 DIAGNOSIS — R11.2 NAUSEA AND VOMITING, UNSPECIFIED VOMITING TYPE: Primary | ICD-10-CM

## 2024-04-22 PROCEDURE — 3075F SYST BP GE 130 - 139MM HG: CPT | Performed by: PHYSICIAN ASSISTANT

## 2024-04-22 PROCEDURE — 1111F DSCHRG MED/CURRENT MED MERGE: CPT | Performed by: PHYSICIAN ASSISTANT

## 2024-04-22 PROCEDURE — 99215 OFFICE O/P EST HI 40 MIN: CPT | Performed by: PHYSICIAN ASSISTANT

## 2024-04-22 PROCEDURE — 3062F POS MACROALBUMINURIA REV: CPT | Performed by: PHYSICIAN ASSISTANT

## 2024-04-22 PROCEDURE — 1159F MED LIST DOCD IN RCRD: CPT | Performed by: PHYSICIAN ASSISTANT

## 2024-04-22 PROCEDURE — 1125F AMNT PAIN NOTED PAIN PRSNT: CPT | Performed by: PHYSICIAN ASSISTANT

## 2024-04-22 PROCEDURE — RXMED WILLOW AMBULATORY MEDICATION CHARGE

## 2024-04-22 PROCEDURE — 3051F HG A1C>EQUAL 7.0%<8.0%: CPT | Performed by: PHYSICIAN ASSISTANT

## 2024-04-22 PROCEDURE — 3078F DIAST BP <80 MM HG: CPT | Performed by: PHYSICIAN ASSISTANT

## 2024-04-22 PROCEDURE — 1160F RVW MEDS BY RX/DR IN RCRD: CPT | Performed by: PHYSICIAN ASSISTANT

## 2024-04-22 PROCEDURE — 3008F BODY MASS INDEX DOCD: CPT | Performed by: PHYSICIAN ASSISTANT

## 2024-04-22 RX ORDER — METOCLOPRAMIDE 5 MG/1
5 TABLET ORAL 2 TIMES DAILY
Qty: 60 TABLET | Refills: 0 | Status: SHIPPED | OUTPATIENT
Start: 2024-04-22 | End: 2025-04-22

## 2024-04-22 ASSESSMENT — PAIN SCALES - GENERAL: PAINLEVEL: 10-WORST PAIN EVER

## 2024-04-22 ASSESSMENT — ENCOUNTER SYMPTOMS
POLYPHAGIA: 0
DYSPHORIC MOOD: 0
CONSTIPATION: 0
ABDOMINAL PAIN: 1
UNEXPECTED WEIGHT CHANGE: 0
CONFUSION: 0
POLYDIPSIA: 0
DYSURIA: 0
BLOOD IN STOOL: 0
COUGH: 0
DIARRHEA: 0
VOMITING: 1
WEAKNESS: 0
AGITATION: 0
CHOKING: 0
EYE REDNESS: 0
SORE THROAT: 0
TROUBLE SWALLOWING: 0
NAUSEA: 1
JOINT SWELLING: 0

## 2024-04-22 NOTE — PROGRESS NOTES
Subjective   Patient ID: Manolo Bashir is a 68 y.o. male who presents for   Last OV with me was on 5/24/23    HPI  Mr. Bashir is a 68 y/o AAM with h/o ESRD secondary to HTN, s/p 2 kidney transplants, with the most recent one back in 2013 (on immunosuppression), h/o DMII, h/o hep C ( treated with 12 wks of Harvoni and has SVR) and h/o prostate ca (s/p radical prostatectomy).      Back in Feb 2023, pt had AUS placed and he developed complications with an infection. It was then removed in end of March 2023. During those months, pt experienced watery diarrhea.      He does have h/o diarrhea (secondary to h/o hemicolectomy), but it is usually controlled with Loperamide.   However, with his current symptoms, he was having abd pain and watery diarrhea (5-10x/day) and having nocturnal episodes. He was given abx for his infected device and after completing therapy, he has been doing well.     At his last OV with me in 5/2023,  he was doing much better.  His BM reverted back to normal and he denied any abd pain. He never obtained the stool orders that was ordered by his nephrologist.   He denied an f/c, n/v, or blood in stool. He did have labs drawn early May 2023 revealing stable CBC, CMP and lipase.      He also had CT abd/p (without contrast) on 5/7/23 revealing:       Abdomen-Pelvis  1. Soft tissue stranding at the urinary bladder and perinephric fat  stranding at the left iliac fossa renal transplant. Please correlate  with laboratory values/urinalysis to exclude superimposed cystitis  and pyelonephritis.  2. Interval removal of the artificial urinary sphincter reservoir  with focal hyperdensity at the left rectus abdominal muscle and  overlying soft tissues, may represent small hematoma.  3. Mild colonic diverticulosis.    Previous GI workup includes:     EGD in May 2018 - normal esophagus, bleeding erosive gastropathy (bx showed chronic gastritis, neg H.pylori, neg for int metaplasia), normal duodenum.     Screening  "Colonoscopy on April 2017 - perianal skin tags, normal ileum, patent end to end ileo colonic anastamosis with healthy mucosa, diverticulosis and non bleeding ext hemorrhoids. No specimens collected.    At the last OV, we did order a screening colonoscopy because he was due back in 2022.  He completed it on 8/2023.  It showed decreased sphincter, patent side to side ileocolonic anast with healthy mucosa, normal ileum, diverticulosis, non bleeding external hemorrhoids.  No specimens collected.  He will be due in 7 yrs (2030).    Since last OV, pt appears to have impaired allograft function and now has CKD,stage 3. He does have AVG that can be used for dialysis if needed.     Pt is here today for periumbilcal abd pain for 3 months.  Pt did have cholecystecomy  approx 3 months ago for his chronic RUQ pain that radiates to his back, but did not seem to \"solve the problem\". Now he is having this persistent \"pulling pain\" around upper abdominal region down to  belly button and now he is having excess gas, with nausea/vomiting in the morning for past month.  Pt admits to throwing up \"food and acid\".  He denies any hematemesis, melena, or any hematochezia.    Last CT abd (no IV) was on 11/15/23 done for right flank pain.    Showed left pelvis transplant kidney, diffuse anasarca, mild bladder wall thickening, sm HH, large amount of gastric debris.     Review of Systems   Constitutional:  Negative for unexpected weight change.   HENT:  Negative for sore throat and trouble swallowing.    Eyes:  Negative for redness.   Respiratory:  Negative for cough and choking.    Cardiovascular:  Negative for chest pain.   Gastrointestinal:  Positive for abdominal pain, nausea and vomiting. Negative for blood in stool, constipation and diarrhea.   Endocrine: Negative for polydipsia and polyphagia.   Genitourinary:  Negative for dysuria.   Musculoskeletal:  Negative for joint swelling.   Skin:  Negative for rash.   Neurological:  Negative for " "weakness.   Psychiatric/Behavioral:  Negative for agitation, confusion and dysphoric mood.        Objective   Visit Vitals  /75   Pulse 63   Temp 36.2 °C (97.2 °F)   Ht 1.727 m (5' 8\")   Wt 77.6 kg (171 lb)   SpO2 99%   BMI 26.00 kg/m²   Smoking Status Never   BSA 1.93 m²      Physical Exam  Vitals reviewed.   Constitutional:       Appearance: He is not ill-appearing or toxic-appearing.   HENT:      Head: Normocephalic and atraumatic.      Mouth/Throat:      Pharynx: Oropharynx is clear. No oropharyngeal exudate.   Eyes:      General: No scleral icterus.  Cardiovascular:      Rate and Rhythm: Normal rate and regular rhythm.      Heart sounds: Normal heart sounds.   Pulmonary:      Effort: Pulmonary effort is normal. No respiratory distress.   Abdominal:      General: Bowel sounds are normal. There is no distension.      Palpations: Abdomen is soft.      Tenderness: There is abdominal tenderness (generalized). There is no guarding or rebound.      Comments: Bruising on RLQ from his insulin injections   Musculoskeletal:         General: No deformity.      Cervical back: Neck supple.   Lymphadenopathy:      Cervical: No cervical adenopathy.   Skin:     Findings: Bruising (RLQ of abdomen) present.   Neurological:      Mental Status: He is alert. Mental status is at baseline.   Psychiatric:         Mood and Affect: Mood normal.         Behavior: Behavior normal.       Assessment/Plan     1) Persistent upper abd pain with n/v -  Based on history and presentation, pt may have gastroparesis.  Will obtain GES an start pt on low dose Reglan (5mg before dinner and before bedtime).    Pt to follow up in 4-6 wks. If sx persist, consider repeat imaging vs proceeding with EGD.  Cont current PPI daily.    2) h/o adenomatous polyps -  S/p colonoscopy on 8/2023 - No polyps found.  Repeat surveillance in 7 yrs (2030)       "

## 2024-04-22 NOTE — PATIENT INSTRUCTIONS
Call radiology at [phone] to schedule the Gastric emptying scan.    Start Reglan - Take 1 tablet before dinner and 1 tablet at bedtime.    Schedule a follow up with me in 4-6 wks.

## 2024-04-24 PROCEDURE — RXMED WILLOW AMBULATORY MEDICATION CHARGE

## 2024-04-25 ENCOUNTER — PHARMACY VISIT (OUTPATIENT)
Dept: PHARMACY | Facility: CLINIC | Age: 69
End: 2024-04-25
Payer: MEDICAID

## 2024-04-26 ENCOUNTER — TELEPHONE (OUTPATIENT)
Dept: TRANSPLANT | Facility: HOSPITAL | Age: 69
End: 2024-04-26
Payer: COMMERCIAL

## 2024-04-26 DIAGNOSIS — N18.6 ESRD (END STAGE RENAL DISEASE) (MULTI): ICD-10-CM

## 2024-04-26 DIAGNOSIS — Z94.0 KIDNEY TRANSPLANTED (HHS-HCC): ICD-10-CM

## 2024-04-26 NOTE — TELEPHONE ENCOUNTER
Spoke with the patient. Confirmed dosage of Bumex 2 mg twice daily. States he has enough medication until Monday 4/29. Script sent to [facility] pending Dr. Arroyo signature.

## 2024-04-27 PROCEDURE — RXMED WILLOW AMBULATORY MEDICATION CHARGE

## 2024-04-27 RX ORDER — BUMETANIDE 2 MG/1
2 TABLET ORAL 2 TIMES DAILY
Qty: 60 TABLET | Refills: 11 | Status: ON HOLD | OUTPATIENT
Start: 2024-04-27 | End: 2024-05-18

## 2024-04-29 ENCOUNTER — TELEPHONE (OUTPATIENT)
Dept: TRANSPLANT | Facility: HOSPITAL | Age: 69
End: 2024-04-29
Payer: COMMERCIAL

## 2024-04-29 ENCOUNTER — PHARMACY VISIT (OUTPATIENT)
Dept: PHARMACY | Facility: CLINIC | Age: 69
End: 2024-04-29
Payer: MEDICAID

## 2024-04-29 NOTE — TELEPHONE ENCOUNTER
Returned phone call to [facility] regarding the refill on losartan.  Patient was last seen by Dr. Arroyo on 4/16 and losartan was not on the current outpatient medication list.     Addendum: Spoke with the patient. He states he is also no longer taking losartan. No refill was sent.

## 2024-04-30 ENCOUNTER — HOSPITAL ENCOUNTER (OUTPATIENT)
Dept: RADIOLOGY | Facility: HOSPITAL | Age: 69
Discharge: HOME | End: 2024-04-30
Payer: COMMERCIAL

## 2024-04-30 VITALS — BODY MASS INDEX: 25.39 KG/M2 | WEIGHT: 167.55 LBS | HEIGHT: 68 IN

## 2024-04-30 DIAGNOSIS — D47.2 MGUS (MONOCLONAL GAMMOPATHY OF UNKNOWN SIGNIFICANCE): ICD-10-CM

## 2024-04-30 DIAGNOSIS — I15.9 SECONDARY HYPERTENSION: ICD-10-CM

## 2024-04-30 DIAGNOSIS — R07.9 CHEST PAIN, UNSPECIFIED TYPE: ICD-10-CM

## 2024-04-30 PROCEDURE — 2550000001 HC RX 255 CONTRASTS: Mod: SE | Performed by: INTERNAL MEDICINE

## 2024-04-30 PROCEDURE — A9575 INJ GADOTERATE MEGLUMI 0.1ML: HCPCS | Mod: SE | Performed by: INTERNAL MEDICINE

## 2024-04-30 PROCEDURE — 75563 CARD MRI W/STRESS IMG & DYE: CPT

## 2024-04-30 PROCEDURE — RXMED WILLOW AMBULATORY MEDICATION CHARGE

## 2024-04-30 PROCEDURE — 75563 CARD MRI W/STRESS IMG & DYE: CPT | Performed by: RADIOLOGY

## 2024-04-30 RX ORDER — GADOTERATE MEGLUMINE 376.9 MG/ML
31 INJECTION INTRAVENOUS
Status: COMPLETED | OUTPATIENT
Start: 2024-04-30 | End: 2024-04-30

## 2024-04-30 RX ADMIN — GADOTERATE MEGLUMINE 31 ML: 376.9 INJECTION INTRAVENOUS at 14:12

## 2024-04-30 NOTE — NURSING NOTE
MRI STRESS        Stress protocol: Regadenoson  Regadenoson Dose: 0.4mg  Aminophylline Dose: 100mg     Resting Vitals:  BP: 123/60  HR: 58 bpm  SPO2: 99% RA  Resting ECG: Sinus bradycardia, LBBB, occasional PVC     Stress:  0.4mg IV push Regadenoson:  1 min: /56  HR 65 bpm 100% RA Symptoms: shortness of breath  2 min: /55  HR 67 bpm 100% RA Symptoms: shortness of breath     Reversal/Recovery:  100mg IV push Aminophylline:  1 min: /49 HR 62 bpm 100% RA Symptoms: Denies  2 min: /49 HR 61 bpm 99% RA Symptoms: Denies  4 min: /54 HR 60 bpm 99% RA Symptoms: Denies  6 min: /56 HR 59 bpm 98% RA Symptoms: Denies     Patients resting HR of 58 bpm wilfredo to a maximum of 67 bpm.  Resting BP of 123/60 was the highest of the exam. Patients post stress EKG remained unchanged.  Patient discharged from the [facility] to home.

## 2024-05-01 ENCOUNTER — LAB (OUTPATIENT)
Dept: LAB | Facility: LAB | Age: 69
End: 2024-05-01
Payer: COMMERCIAL

## 2024-05-01 DIAGNOSIS — Z94.0 KIDNEY REPLACED BY TRANSPLANT (HHS-HCC): ICD-10-CM

## 2024-05-01 DIAGNOSIS — Z94.0 KIDNEY TRANSPLANTED (HHS-HCC): ICD-10-CM

## 2024-05-01 LAB
25(OH)D3 SERPL-MCNC: 46 NG/ML (ref 30–100)
ALBUMIN SERPL BCP-MCNC: 3.4 G/DL (ref 3.4–5)
ANION GAP SERPL CALC-SCNC: 10 MMOL/L (ref 10–20)
BASOPHILS # BLD AUTO: 0.02 X10*3/UL (ref 0–0.1)
BASOPHILS NFR BLD AUTO: 0.9 %
BUN SERPL-MCNC: 65 MG/DL (ref 6–23)
CALCIUM SERPL-MCNC: 9.5 MG/DL (ref 8.6–10.6)
CHLORIDE SERPL-SCNC: 108 MMOL/L (ref 98–107)
CO2 SERPL-SCNC: 30 MMOL/L (ref 21–32)
CREAT SERPL-MCNC: 4.72 MG/DL (ref 0.5–1.3)
EGFRCR SERPLBLD CKD-EPI 2021: 13 ML/MIN/1.73M*2
EOSINOPHIL # BLD AUTO: 0.08 X10*3/UL (ref 0–0.7)
EOSINOPHIL NFR BLD AUTO: 3.6 %
ERYTHROCYTE [DISTWIDTH] IN BLOOD BY AUTOMATED COUNT: 14.6 % (ref 11.5–14.5)
FOLATE SERPL-MCNC: 15.9 NG/ML
GLUCOSE SERPL-MCNC: 68 MG/DL (ref 74–99)
HCT VFR BLD AUTO: 27 % (ref 41–52)
HGB BLD-MCNC: 8.6 G/DL (ref 13.5–17.5)
IMM GRANULOCYTES # BLD AUTO: 0.02 X10*3/UL (ref 0–0.7)
IMM GRANULOCYTES NFR BLD AUTO: 0.9 % (ref 0–0.9)
IRON SATN MFR SERPL: 25 % (ref 25–45)
IRON SERPL-MCNC: 47 UG/DL (ref 35–150)
LYMPHOCYTES # BLD AUTO: 0.71 X10*3/UL (ref 1.2–4.8)
LYMPHOCYTES NFR BLD AUTO: 31.7 %
MCH RBC QN AUTO: 29.5 PG (ref 26–34)
MCHC RBC AUTO-ENTMCNC: 31.9 G/DL (ref 32–36)
MCV RBC AUTO: 93 FL (ref 80–100)
MONOCYTES # BLD AUTO: 0.39 X10*3/UL (ref 0.1–1)
MONOCYTES NFR BLD AUTO: 17.4 %
NEUTROPHILS # BLD AUTO: 1.02 X10*3/UL (ref 1.2–7.7)
NEUTROPHILS NFR BLD AUTO: 45.5 %
NRBC BLD-RTO: 0 /100 WBCS (ref 0–0)
PHOSPHATE SERPL-MCNC: 3.1 MG/DL (ref 2.5–4.9)
PLATELET # BLD AUTO: 113 X10*3/UL (ref 150–450)
POTASSIUM SERPL-SCNC: 4 MMOL/L (ref 3.5–5.3)
PTH-INTACT SERPL-MCNC: 182.6 PG/ML (ref 18.5–88)
RBC # BLD AUTO: 2.92 X10*6/UL (ref 4.5–5.9)
SODIUM SERPL-SCNC: 144 MMOL/L (ref 136–145)
TACROLIMUS BLD-MCNC: 5.9 NG/ML
TIBC SERPL-MCNC: 191 UG/DL (ref 240–445)
UIBC SERPL-MCNC: 144 UG/DL (ref 110–370)
VIT B12 SERPL-MCNC: 338 PG/ML (ref 211–911)
WBC # BLD AUTO: 2.2 X10*3/UL (ref 4.4–11.3)

## 2024-05-01 PROCEDURE — 82306 VITAMIN D 25 HYDROXY: CPT

## 2024-05-01 PROCEDURE — 83550 IRON BINDING TEST: CPT

## 2024-05-01 PROCEDURE — 82746 ASSAY OF FOLIC ACID SERUM: CPT

## 2024-05-01 PROCEDURE — 85025 COMPLETE CBC W/AUTO DIFF WBC: CPT

## 2024-05-01 PROCEDURE — 80069 RENAL FUNCTION PANEL: CPT

## 2024-05-01 PROCEDURE — 80197 ASSAY OF TACROLIMUS: CPT

## 2024-05-01 PROCEDURE — 83540 ASSAY OF IRON: CPT

## 2024-05-01 PROCEDURE — 87799 DETECT AGENT NOS DNA QUANT: CPT

## 2024-05-01 PROCEDURE — 82607 VITAMIN B-12: CPT

## 2024-05-01 PROCEDURE — 36415 COLL VENOUS BLD VENIPUNCTURE: CPT

## 2024-05-01 PROCEDURE — 83970 ASSAY OF PARATHORMONE: CPT

## 2024-05-02 LAB
CMV DNA SERPL NAA+PROBE-LOG IU: NORMAL {LOG_IU}/ML
EBV DNA SPEC NAA+PROBE-LOG#: NORMAL {LOG_COPIES}/ML
LABORATORY COMMENT REPORT: NOT DETECTED
LABORATORY COMMENT REPORT: NOT DETECTED

## 2024-05-03 DIAGNOSIS — N28.9 RENAL FUNCTION IMPAIRMENT: ICD-10-CM

## 2024-05-03 DIAGNOSIS — Z94.0 KIDNEY TRANSPLANTED (HHS-HCC): ICD-10-CM

## 2024-05-03 DIAGNOSIS — Z94.0 IMMUNOSUPPRESSIVE MANAGEMENT ENCOUNTER FOLLOWING KIDNEY TRANSPLANT (HHS-HCC): ICD-10-CM

## 2024-05-03 DIAGNOSIS — N18.6 ESRD (END STAGE RENAL DISEASE) (MULTI): ICD-10-CM

## 2024-05-03 DIAGNOSIS — Z94.0 KIDNEY REPLACED BY TRANSPLANT (HHS-HCC): ICD-10-CM

## 2024-05-03 DIAGNOSIS — Z79.899 IMMUNOSUPPRESSIVE MANAGEMENT ENCOUNTER FOLLOWING KIDNEY TRANSPLANT (HHS-HCC): ICD-10-CM

## 2024-05-03 PROCEDURE — RXMED WILLOW AMBULATORY MEDICATION CHARGE

## 2024-05-03 RX ORDER — PREDNISONE 5 MG/1
5 TABLET ORAL EVERY MORNING
Qty: 30 TABLET | Refills: 11 | Status: CANCELLED | OUTPATIENT
Start: 2024-05-03 | End: 2025-05-03

## 2024-05-03 RX ORDER — AZATHIOPRINE 50 MG/1
25 TABLET ORAL DAILY
Qty: 15 TABLET | Refills: 11 | Status: CANCELLED | OUTPATIENT
Start: 2024-05-03 | End: 2025-05-03

## 2024-05-03 NOTE — TELEPHONE ENCOUNTER
Reviewed with Dr. Kennedy:     Creatinine: (4.72 on 5/1); (5.72); (6.24); (6.41)  WBC: (2.2 on 5/1); (2.6); (3.7); (3.5)   Hemoglobin: (8.6); (8.4); (8.2) (7.1)   Tac: (5.9); (5.4); (3.5)     Plan:   1 If taking Imuran 50 mg decrease to 25 mg, if on 25 mg, hold medication.   2 Repeat CBC next week.     Spoke with the patient. He confirmed that he is taking Azathioprine  1 tablet (50 mg) once daily. He has verbalized understanding to decrease dose to 25 mg. He is also requesting a refill for Prednisone 5 mg once daily. Scripts sent to [facility] pending Dr. Kennedy signature.

## 2024-05-04 ENCOUNTER — PHARMACY VISIT (OUTPATIENT)
Dept: PHARMACY | Facility: CLINIC | Age: 69
End: 2024-05-04
Payer: MEDICAID

## 2024-05-06 ENCOUNTER — APPOINTMENT (OUTPATIENT)
Dept: PODIATRY | Facility: CLINIC | Age: 69
End: 2024-05-06
Payer: COMMERCIAL

## 2024-05-06 ENCOUNTER — PHARMACY VISIT (OUTPATIENT)
Dept: PHARMACY | Facility: CLINIC | Age: 69
End: 2024-05-06
Payer: MEDICAID

## 2024-05-06 DIAGNOSIS — L30.9 DERMATITIS: ICD-10-CM

## 2024-05-06 RX ORDER — PERMETHRIN 50 MG/G
CREAM TOPICAL DAILY
Qty: 60 G | Refills: 0 | Status: CANCELLED | OUTPATIENT
Start: 2024-05-06

## 2024-05-07 ENCOUNTER — TELEPHONE (OUTPATIENT)
Dept: CARDIOLOGY | Facility: HOSPITAL | Age: 69
End: 2024-05-07
Payer: COMMERCIAL

## 2024-05-07 NOTE — TELEPHONE ENCOUNTER
----- Message from Sumeet Bacon MD sent at 5/4/2024  7:17 AM EDT -----  Patient notification: cardiac MRI showed enlarged heart with near normal contraction, there was no evidence of significant blockages in the heart arteries. You may continue your current medications. Please schedule cardiology follow-up in the next 3-6 months.

## 2024-05-10 ENCOUNTER — PHARMACY VISIT (OUTPATIENT)
Dept: PHARMACY | Facility: CLINIC | Age: 69
End: 2024-05-10
Payer: MEDICAID

## 2024-05-10 PROCEDURE — RXMED WILLOW AMBULATORY MEDICATION CHARGE

## 2024-05-11 ENCOUNTER — HOSPITAL ENCOUNTER (INPATIENT)
Facility: HOSPITAL | Age: 69
LOS: 8 days | Discharge: HOME | End: 2024-05-19
Attending: EMERGENCY MEDICINE | Admitting: INTERNAL MEDICINE
Payer: COMMERCIAL

## 2024-05-11 ENCOUNTER — APPOINTMENT (OUTPATIENT)
Dept: RADIOLOGY | Facility: HOSPITAL | Age: 69
End: 2024-05-11
Payer: COMMERCIAL

## 2024-05-11 ENCOUNTER — CLINICAL SUPPORT (OUTPATIENT)
Dept: EMERGENCY MEDICINE | Facility: HOSPITAL | Age: 69
End: 2024-05-11
Payer: COMMERCIAL

## 2024-05-11 DIAGNOSIS — N18.6 ESRD (END STAGE RENAL DISEASE) (MULTI): ICD-10-CM

## 2024-05-11 DIAGNOSIS — M79.89 OTHER SPECIFIED SOFT TISSUE DISORDERS: ICD-10-CM

## 2024-05-11 DIAGNOSIS — R06.02 SHORTNESS OF BREATH: Primary | ICD-10-CM

## 2024-05-11 DIAGNOSIS — R22.31 LOCALIZED SWELLING OF RIGHT UPPER EXTREMITY: ICD-10-CM

## 2024-05-11 DIAGNOSIS — E11.9 DIABETES MELLITUS TYPE 2 WITHOUT RETINOPATHY (MULTI): ICD-10-CM

## 2024-05-11 DIAGNOSIS — Z79.4 TYPE 2 DIABETES MELLITUS WITHOUT COMPLICATION, WITH LONG-TERM CURRENT USE OF INSULIN (MULTI): ICD-10-CM

## 2024-05-11 DIAGNOSIS — Z94.0 KIDNEY REPLACED BY TRANSPLANT (HHS-HCC): ICD-10-CM

## 2024-05-11 DIAGNOSIS — E16.2 HYPOGLYCEMIA: ICD-10-CM

## 2024-05-11 DIAGNOSIS — J18.9 PNEUMONIA DUE TO INFECTIOUS ORGANISM, UNSPECIFIED LATERALITY, UNSPECIFIED PART OF LUNG: ICD-10-CM

## 2024-05-11 DIAGNOSIS — Z94.0 KIDNEY TRANSPLANTED (HHS-HCC): ICD-10-CM

## 2024-05-11 DIAGNOSIS — C61 ADENOCARCINOMA OF PROSTATE (MULTI): ICD-10-CM

## 2024-05-11 DIAGNOSIS — E87.70 HYPERVOLEMIA, UNSPECIFIED HYPERVOLEMIA TYPE: ICD-10-CM

## 2024-05-11 DIAGNOSIS — E11.9 TYPE 2 DIABETES MELLITUS WITHOUT COMPLICATION, WITH LONG-TERM CURRENT USE OF INSULIN (MULTI): ICD-10-CM

## 2024-05-11 DIAGNOSIS — R09.02 HYPOXIA: ICD-10-CM

## 2024-05-11 DIAGNOSIS — N12 PYELONEPHRITIS: ICD-10-CM

## 2024-05-11 LAB
ALBUMIN SERPL BCP-MCNC: 3.3 G/DL (ref 3.4–5)
ALBUMIN SERPL BCP-MCNC: 3.4 G/DL (ref 3.4–5)
ALP SERPL-CCNC: 47 U/L (ref 33–136)
ALT SERPL W P-5'-P-CCNC: 48 U/L (ref 10–52)
AMYLASE SERPL-CCNC: 14 U/L (ref 29–103)
ANION GAP BLDV CALCULATED.4IONS-SCNC: 3 MMOL/L (ref 10–25)
ANION GAP SERPL CALC-SCNC: 11 MMOL/L (ref 10–20)
ANION GAP SERPL CALC-SCNC: 17 MMOL/L (ref 10–20)
APPEARANCE UR: CLEAR
AST SERPL W P-5'-P-CCNC: 48 U/L (ref 9–39)
BACTERIA #/AREA URNS AUTO: ABNORMAL /HPF
BASE EXCESS BLDV CALC-SCNC: 5.4 MMOL/L (ref -2–3)
BASOPHILS # BLD AUTO: 0.03 X10*3/UL (ref 0–0.1)
BASOPHILS NFR BLD AUTO: 0.5 %
BILIRUB SERPL-MCNC: 0.4 MG/DL (ref 0–1.2)
BILIRUB UR STRIP.AUTO-MCNC: NEGATIVE MG/DL
BNP SERPL-MCNC: 855 PG/ML (ref 0–99)
BODY TEMPERATURE: 37 DEGREES CELSIUS
BUN SERPL-MCNC: 57 MG/DL (ref 6–23)
BUN SERPL-MCNC: 62 MG/DL (ref 6–23)
CA-I BLDV-SCNC: 1.36 MMOL/L (ref 1.1–1.33)
CALCIUM SERPL-MCNC: 9.1 MG/DL (ref 8.6–10.6)
CALCIUM SERPL-MCNC: 9.4 MG/DL (ref 8.6–10.6)
CARDIAC TROPONIN I PNL SERPL HS: 41 NG/L (ref 0–53)
CARDIAC TROPONIN I PNL SERPL HS: 47 NG/L (ref 0–53)
CARDIAC TROPONIN I PNL SERPL HS: 53 NG/L (ref 0–53)
CHLORIDE BLDV-SCNC: 111 MMOL/L (ref 98–107)
CHLORIDE SERPL-SCNC: 105 MMOL/L (ref 98–107)
CHLORIDE SERPL-SCNC: 106 MMOL/L (ref 98–107)
CO2 SERPL-SCNC: 26 MMOL/L (ref 21–32)
CO2 SERPL-SCNC: 29 MMOL/L (ref 21–32)
COLOR UR: ABNORMAL
CREAT SERPL-MCNC: 4.58 MG/DL (ref 0.5–1.3)
CREAT SERPL-MCNC: 4.58 MG/DL (ref 0.5–1.3)
EGFRCR SERPLBLD CKD-EPI 2021: 13 ML/MIN/1.73M*2
EGFRCR SERPLBLD CKD-EPI 2021: 13 ML/MIN/1.73M*2
EOSINOPHIL # BLD AUTO: 0.13 X10*3/UL (ref 0–0.7)
EOSINOPHIL NFR BLD AUTO: 2.1 %
ERYTHROCYTE [DISTWIDTH] IN BLOOD BY AUTOMATED COUNT: 14.9 % (ref 11.5–14.5)
FERRITIN SERPL-MCNC: 602 NG/ML (ref 20–300)
GLUCOSE BLD MANUAL STRIP-MCNC: 123 MG/DL (ref 74–99)
GLUCOSE BLD MANUAL STRIP-MCNC: 40 MG/DL (ref 74–99)
GLUCOSE BLD MANUAL STRIP-MCNC: 51 MG/DL (ref 74–99)
GLUCOSE BLD MANUAL STRIP-MCNC: 54 MG/DL (ref 74–99)
GLUCOSE BLD MANUAL STRIP-MCNC: 60 MG/DL (ref 74–99)
GLUCOSE BLD MANUAL STRIP-MCNC: 64 MG/DL (ref 74–99)
GLUCOSE BLD MANUAL STRIP-MCNC: 69 MG/DL (ref 74–99)
GLUCOSE BLD MANUAL STRIP-MCNC: 70 MG/DL (ref 74–99)
GLUCOSE BLD MANUAL STRIP-MCNC: 89 MG/DL (ref 74–99)
GLUCOSE BLD MANUAL STRIP-MCNC: 97 MG/DL (ref 74–99)
GLUCOSE BLDV-MCNC: 60 MG/DL (ref 74–99)
GLUCOSE SERPL-MCNC: 49 MG/DL (ref 74–99)
GLUCOSE SERPL-MCNC: 57 MG/DL (ref 74–99)
GLUCOSE UR STRIP.AUTO-MCNC: NORMAL MG/DL
HCO3 BLDV-SCNC: 29.9 MMOL/L (ref 22–26)
HCT VFR BLD AUTO: 25.6 % (ref 41–52)
HCT VFR BLD EST: 27 % (ref 41–52)
HGB BLD-MCNC: 8.6 G/DL (ref 13.5–17.5)
HGB BLDV-MCNC: 8.9 G/DL (ref 13.5–17.5)
HOLD SPECIMEN: NORMAL
HYALINE CASTS #/AREA URNS AUTO: ABNORMAL /LPF
IMM GRANULOCYTES # BLD AUTO: 0.11 X10*3/UL (ref 0–0.7)
IMM GRANULOCYTES NFR BLD AUTO: 1.8 % (ref 0–0.9)
IRON SATN MFR SERPL: 13 % (ref 25–45)
IRON SERPL-MCNC: 22 UG/DL (ref 35–150)
KETONES UR STRIP.AUTO-MCNC: NEGATIVE MG/DL
LACTATE BLDV-SCNC: 0.9 MMOL/L (ref 0.4–2)
LEUKOCYTE ESTERASE UR QL STRIP.AUTO: NEGATIVE
LIPASE SERPL-CCNC: 3 U/L (ref 9–82)
LYMPHOCYTES # BLD AUTO: 0.66 X10*3/UL (ref 1.2–4.8)
LYMPHOCYTES NFR BLD AUTO: 10.8 %
MAGNESIUM SERPL-MCNC: 1.85 MG/DL (ref 1.6–2.4)
MAGNESIUM SERPL-MCNC: 2.09 MG/DL (ref 1.6–2.4)
MCH RBC QN AUTO: 29.5 PG (ref 26–34)
MCHC RBC AUTO-ENTMCNC: 33.6 G/DL (ref 32–36)
MCV RBC AUTO: 88 FL (ref 80–100)
MONOCYTES # BLD AUTO: 0.65 X10*3/UL (ref 0.1–1)
MONOCYTES NFR BLD AUTO: 10.7 %
MRSA DNA SPEC QL NAA+PROBE: NOT DETECTED
MUCOUS THREADS #/AREA URNS AUTO: ABNORMAL /LPF
NEUTROPHILS # BLD AUTO: 4.51 X10*3/UL (ref 1.2–7.7)
NEUTROPHILS NFR BLD AUTO: 74.1 %
NITRITE UR QL STRIP.AUTO: NEGATIVE
NRBC BLD-RTO: 0 /100 WBCS (ref 0–0)
OXYHGB MFR BLDV: 83.2 % (ref 45–75)
PCO2 BLDV: 43 MM HG (ref 41–51)
PH BLDV: 7.45 PH (ref 7.33–7.43)
PH UR STRIP.AUTO: 6 [PH]
PHOSPHATE SERPL-MCNC: 2.3 MG/DL (ref 2.5–4.9)
PHOSPHATE SERPL-MCNC: 3.9 MG/DL (ref 2.5–4.9)
PLATELET # BLD AUTO: 132 X10*3/UL (ref 150–450)
PO2 BLDV: 54 MM HG (ref 35–45)
POTASSIUM BLDV-SCNC: 4.3 MMOL/L (ref 3.5–5.3)
POTASSIUM SERPL-SCNC: 4.3 MMOL/L (ref 3.5–5.3)
POTASSIUM SERPL-SCNC: 4.3 MMOL/L (ref 3.5–5.3)
PROT SERPL-MCNC: 6.2 G/DL (ref 6.4–8.2)
PROT UR STRIP.AUTO-MCNC: ABNORMAL MG/DL
RBC # BLD AUTO: 2.92 X10*6/UL (ref 4.5–5.9)
RBC # UR STRIP.AUTO: NEGATIVE /UL
RBC #/AREA URNS AUTO: ABNORMAL /HPF
SAO2 % BLDV: 86 % (ref 45–75)
SODIUM BLDV-SCNC: 140 MMOL/L (ref 136–145)
SODIUM SERPL-SCNC: 141 MMOL/L (ref 136–145)
SODIUM SERPL-SCNC: 145 MMOL/L (ref 136–145)
SP GR UR STRIP.AUTO: 1.01
SQUAMOUS #/AREA URNS AUTO: ABNORMAL /HPF
TIBC SERPL-MCNC: 171 UG/DL (ref 240–445)
UIBC SERPL-MCNC: 149 UG/DL (ref 110–370)
UROBILINOGEN UR STRIP.AUTO-MCNC: NORMAL MG/DL
WBC # BLD AUTO: 6.1 X10*3/UL (ref 4.4–11.3)
WBC #/AREA URNS AUTO: ABNORMAL /HPF

## 2024-05-11 PROCEDURE — 2500000006 HC RX 250 W HCPCS SELF ADMINISTERED DRUGS (ALT 637 FOR ALL PAYERS): Performed by: STUDENT IN AN ORGANIZED HEALTH CARE EDUCATION/TRAINING PROGRAM

## 2024-05-11 PROCEDURE — 2500000004 HC RX 250 GENERAL PHARMACY W/ HCPCS (ALT 636 FOR OP/ED): Mod: SE

## 2024-05-11 PROCEDURE — 84132 ASSAY OF SERUM POTASSIUM: CPT

## 2024-05-11 PROCEDURE — 82150 ASSAY OF AMYLASE: CPT | Performed by: INTERNAL MEDICINE

## 2024-05-11 PROCEDURE — 2500000005 HC RX 250 GENERAL PHARMACY W/O HCPCS: Performed by: STUDENT IN AN ORGANIZED HEALTH CARE EDUCATION/TRAINING PROGRAM

## 2024-05-11 PROCEDURE — 71046 X-RAY EXAM CHEST 2 VIEWS: CPT

## 2024-05-11 PROCEDURE — 84484 ASSAY OF TROPONIN QUANT: CPT | Performed by: STUDENT IN AN ORGANIZED HEALTH CARE EDUCATION/TRAINING PROGRAM

## 2024-05-11 PROCEDURE — 81001 URINALYSIS AUTO W/SCOPE: CPT

## 2024-05-11 PROCEDURE — 84132 ASSAY OF SERUM POTASSIUM: CPT | Performed by: STUDENT IN AN ORGANIZED HEALTH CARE EDUCATION/TRAINING PROGRAM

## 2024-05-11 PROCEDURE — 71275 CT ANGIOGRAPHY CHEST: CPT

## 2024-05-11 PROCEDURE — 99285 EMERGENCY DEPT VISIT HI MDM: CPT | Performed by: EMERGENCY MEDICINE

## 2024-05-11 PROCEDURE — 96375 TX/PRO/DX INJ NEW DRUG ADDON: CPT

## 2024-05-11 PROCEDURE — 83690 ASSAY OF LIPASE: CPT | Performed by: INTERNAL MEDICINE

## 2024-05-11 PROCEDURE — 2500000002 HC RX 250 W HCPCS SELF ADMINISTERED DRUGS (ALT 637 FOR MEDICARE OP, ALT 636 FOR OP/ED): Performed by: STUDENT IN AN ORGANIZED HEALTH CARE EDUCATION/TRAINING PROGRAM

## 2024-05-11 PROCEDURE — 71046 X-RAY EXAM CHEST 2 VIEWS: CPT | Performed by: RADIOLOGY

## 2024-05-11 PROCEDURE — 74174 CTA ABD&PLVS W/CONTRAST: CPT | Performed by: RADIOLOGY

## 2024-05-11 PROCEDURE — 87641 MR-STAPH DNA AMP PROBE: CPT

## 2024-05-11 PROCEDURE — 2500000005 HC RX 250 GENERAL PHARMACY W/O HCPCS: Mod: SE

## 2024-05-11 PROCEDURE — 83735 ASSAY OF MAGNESIUM: CPT

## 2024-05-11 PROCEDURE — 82947 ASSAY GLUCOSE BLOOD QUANT: CPT

## 2024-05-11 PROCEDURE — 83880 ASSAY OF NATRIURETIC PEPTIDE: CPT

## 2024-05-11 PROCEDURE — 36415 COLL VENOUS BLD VENIPUNCTURE: CPT

## 2024-05-11 PROCEDURE — 1210000001 HC SEMI-PRIVATE ROOM DAILY

## 2024-05-11 PROCEDURE — 71275 CT ANGIOGRAPHY CHEST: CPT | Performed by: RADIOLOGY

## 2024-05-11 PROCEDURE — 83735 ASSAY OF MAGNESIUM: CPT | Performed by: STUDENT IN AN ORGANIZED HEALTH CARE EDUCATION/TRAINING PROGRAM

## 2024-05-11 PROCEDURE — 2500000004 HC RX 250 GENERAL PHARMACY W/ HCPCS (ALT 636 FOR OP/ED): Performed by: INTERNAL MEDICINE

## 2024-05-11 PROCEDURE — 96361 HYDRATE IV INFUSION ADD-ON: CPT

## 2024-05-11 PROCEDURE — 96365 THER/PROPH/DIAG IV INF INIT: CPT

## 2024-05-11 PROCEDURE — 2500000004 HC RX 250 GENERAL PHARMACY W/ HCPCS (ALT 636 FOR OP/ED)

## 2024-05-11 PROCEDURE — 2500000005 HC RX 250 GENERAL PHARMACY W/O HCPCS

## 2024-05-11 PROCEDURE — 96367 TX/PROPH/DG ADDL SEQ IV INF: CPT

## 2024-05-11 PROCEDURE — 83540 ASSAY OF IRON: CPT | Performed by: INTERNAL MEDICINE

## 2024-05-11 PROCEDURE — 85025 COMPLETE CBC W/AUTO DIFF WBC: CPT

## 2024-05-11 PROCEDURE — 99222 1ST HOSP IP/OBS MODERATE 55: CPT | Performed by: INTERNAL MEDICINE

## 2024-05-11 PROCEDURE — 93005 ELECTROCARDIOGRAM TRACING: CPT

## 2024-05-11 PROCEDURE — 84484 ASSAY OF TROPONIN QUANT: CPT

## 2024-05-11 PROCEDURE — 2500000001 HC RX 250 WO HCPCS SELF ADMINISTERED DRUGS (ALT 637 FOR MEDICARE OP): Performed by: STUDENT IN AN ORGANIZED HEALTH CARE EDUCATION/TRAINING PROGRAM

## 2024-05-11 PROCEDURE — 93010 ELECTROCARDIOGRAM REPORT: CPT | Performed by: EMERGENCY MEDICINE

## 2024-05-11 PROCEDURE — 2500000004 HC RX 250 GENERAL PHARMACY W/ HCPCS (ALT 636 FOR OP/ED): Performed by: STUDENT IN AN ORGANIZED HEALTH CARE EDUCATION/TRAINING PROGRAM

## 2024-05-11 PROCEDURE — 80053 COMPREHEN METABOLIC PANEL: CPT

## 2024-05-11 PROCEDURE — 99285 EMERGENCY DEPT VISIT HI MDM: CPT | Mod: 25

## 2024-05-11 PROCEDURE — 99223 1ST HOSP IP/OBS HIGH 75: CPT | Performed by: INTERNAL MEDICINE

## 2024-05-11 PROCEDURE — 84100 ASSAY OF PHOSPHORUS: CPT

## 2024-05-11 PROCEDURE — 82728 ASSAY OF FERRITIN: CPT | Performed by: INTERNAL MEDICINE

## 2024-05-11 RX ORDER — VANCOMYCIN HYDROCHLORIDE 1 G/200ML
1000 INJECTION, SOLUTION INTRAVENOUS ONCE
Qty: 200 ML | Refills: 0 | Status: COMPLETED | OUTPATIENT
Start: 2024-05-11 | End: 2024-05-11

## 2024-05-11 RX ORDER — ISOSORBIDE MONONITRATE 60 MG/1
60 TABLET, EXTENDED RELEASE ORAL DAILY
Status: DISCONTINUED | OUTPATIENT
Start: 2024-05-11 | End: 2024-05-19 | Stop reason: HOSPADM

## 2024-05-11 RX ORDER — TACROLIMUS 0.5 MG/1
1.5 CAPSULE ORAL EVERY MORNING
Status: DISCONTINUED | OUTPATIENT
Start: 2024-05-11 | End: 2024-05-11

## 2024-05-11 RX ORDER — TACROLIMUS 1 MG/1
2 CAPSULE ORAL NIGHTLY
Status: DISCONTINUED | OUTPATIENT
Start: 2024-05-11 | End: 2024-05-17

## 2024-05-11 RX ORDER — BISMUTH SUBSALICYLATE 262 MG
1 TABLET,CHEWABLE ORAL DAILY
Status: DISCONTINUED | OUTPATIENT
Start: 2024-05-11 | End: 2024-05-11

## 2024-05-11 RX ORDER — PERMETHRIN 50 MG/G
CREAM TOPICAL DAILY
Status: DISCONTINUED | OUTPATIENT
Start: 2024-05-11 | End: 2024-05-13

## 2024-05-11 RX ORDER — DEXTROSE 50 % IN WATER (D50W) INTRAVENOUS SYRINGE
25 ONCE
Status: COMPLETED | OUTPATIENT
Start: 2024-05-11 | End: 2024-05-11

## 2024-05-11 RX ORDER — AZATHIOPRINE 50 MG/1
50 TABLET ORAL DAILY
Status: DISCONTINUED | OUTPATIENT
Start: 2024-05-11 | End: 2024-05-19 | Stop reason: HOSPADM

## 2024-05-11 RX ORDER — MULTIVIT-MIN/IRON FUM/FOLIC AC 7.5 MG-4
1 TABLET ORAL DAILY
Status: DISCONTINUED | OUTPATIENT
Start: 2024-05-11 | End: 2024-05-16

## 2024-05-11 RX ORDER — DEXTROSE 50 % IN WATER (D50W) INTRAVENOUS SYRINGE
12.5
Status: DISCONTINUED | OUTPATIENT
Start: 2024-05-11 | End: 2024-05-13

## 2024-05-11 RX ORDER — PRAVASTATIN SODIUM 20 MG/1
10 TABLET ORAL NIGHTLY
Status: DISCONTINUED | OUTPATIENT
Start: 2024-05-11 | End: 2024-05-19 | Stop reason: HOSPADM

## 2024-05-11 RX ORDER — HYDROMORPHONE HYDROCHLORIDE 1 MG/ML
0.4 INJECTION, SOLUTION INTRAMUSCULAR; INTRAVENOUS; SUBCUTANEOUS
Status: DISCONTINUED | OUTPATIENT
Start: 2024-05-11 | End: 2024-05-11

## 2024-05-11 RX ORDER — HYDRALAZINE HYDROCHLORIDE 25 MG/1
100 TABLET, FILM COATED ORAL 3 TIMES DAILY
Status: DISCONTINUED | OUTPATIENT
Start: 2024-05-11 | End: 2024-05-11

## 2024-05-11 RX ORDER — ONDANSETRON 4 MG/1
4 TABLET, FILM COATED ORAL EVERY 8 HOURS PRN
Status: DISCONTINUED | OUTPATIENT
Start: 2024-05-11 | End: 2024-05-19 | Stop reason: HOSPADM

## 2024-05-11 RX ORDER — DEXTROSE 50 % IN WATER (D50W) INTRAVENOUS SYRINGE
25 EVERY 2 HOUR PRN
Status: DISCONTINUED | OUTPATIENT
Start: 2024-05-11 | End: 2024-05-11 | Stop reason: DRUGHIGH

## 2024-05-11 RX ORDER — CINACALCET 30 MG/1
30 TABLET, FILM COATED ORAL DAILY
Status: DISCONTINUED | OUTPATIENT
Start: 2024-05-11 | End: 2024-05-19 | Stop reason: HOSPADM

## 2024-05-11 RX ORDER — AZATHIOPRINE 50 MG/1
50 TABLET ORAL DAILY
Status: CANCELLED | OUTPATIENT
Start: 2024-05-12

## 2024-05-11 RX ORDER — TRAMADOL HYDROCHLORIDE 50 MG/1
50 TABLET ORAL EVERY 12 HOURS PRN
Status: DISCONTINUED | OUTPATIENT
Start: 2024-05-11 | End: 2024-05-19 | Stop reason: HOSPADM

## 2024-05-11 RX ORDER — TACROLIMUS 0.5 MG/1
2 CAPSULE ORAL NIGHTLY
Status: DISCONTINUED | OUTPATIENT
Start: 2024-05-11 | End: 2024-05-11

## 2024-05-11 RX ORDER — HEPARIN SODIUM 5000 [USP'U]/ML
5000 INJECTION, SOLUTION INTRAVENOUS; SUBCUTANEOUS EVERY 8 HOURS SCHEDULED
Status: DISCONTINUED | OUTPATIENT
Start: 2024-05-11 | End: 2024-05-19 | Stop reason: HOSPADM

## 2024-05-11 RX ORDER — NAPROXEN SODIUM 220 MG/1
81 TABLET, FILM COATED ORAL DAILY
Status: DISCONTINUED | OUTPATIENT
Start: 2024-05-11 | End: 2024-05-19 | Stop reason: HOSPADM

## 2024-05-11 RX ORDER — DEXTROSE 50 % IN WATER (D50W) INTRAVENOUS SYRINGE
Status: COMPLETED
Start: 2024-05-11 | End: 2024-05-11

## 2024-05-11 RX ORDER — PREDNISONE 5 MG/1
5 TABLET ORAL EVERY MORNING
Status: DISCONTINUED | OUTPATIENT
Start: 2024-05-11 | End: 2024-05-19 | Stop reason: HOSPADM

## 2024-05-11 RX ORDER — HYDRALAZINE HYDROCHLORIDE 20 MG/ML
5 INJECTION INTRAMUSCULAR; INTRAVENOUS ONCE
Status: COMPLETED | OUTPATIENT
Start: 2024-05-11 | End: 2024-05-11

## 2024-05-11 RX ORDER — DEXTROSE MONOHYDRATE 100 MG/ML
30 INJECTION, SOLUTION INTRAVENOUS CONTINUOUS
Status: DISCONTINUED | OUTPATIENT
Start: 2024-05-11 | End: 2024-05-11

## 2024-05-11 RX ORDER — HYDRALAZINE HYDROCHLORIDE 25 MG/1
100 TABLET, FILM COATED ORAL EVERY 8 HOURS
Status: DISCONTINUED | OUTPATIENT
Start: 2024-05-11 | End: 2024-05-19 | Stop reason: HOSPADM

## 2024-05-11 RX ORDER — NIFEDIPINE 60 MG/1
60 TABLET, FILM COATED, EXTENDED RELEASE ORAL 2 TIMES DAILY
Status: DISCONTINUED | OUTPATIENT
Start: 2024-05-11 | End: 2024-05-19 | Stop reason: HOSPADM

## 2024-05-11 RX ORDER — BUMETANIDE 0.25 MG/ML
2 INJECTION INTRAMUSCULAR; INTRAVENOUS ONCE
Status: COMPLETED | OUTPATIENT
Start: 2024-05-11 | End: 2024-05-11

## 2024-05-11 RX ORDER — VANCOMYCIN HYDROCHLORIDE 1 G/200ML
1000 INJECTION, SOLUTION INTRAVENOUS ONCE
Status: DISCONTINUED | OUTPATIENT
Start: 2024-05-11 | End: 2024-05-11 | Stop reason: ALTCHOICE

## 2024-05-11 RX ORDER — DEXTROSE 50 % IN WATER (D50W) INTRAVENOUS SYRINGE
25
Status: DISCONTINUED | OUTPATIENT
Start: 2024-05-11 | End: 2024-05-13

## 2024-05-11 RX ORDER — TACROLIMUS 1 MG/G
OINTMENT TOPICAL 2 TIMES DAILY
Status: DISCONTINUED | OUTPATIENT
Start: 2024-05-11 | End: 2024-05-19 | Stop reason: HOSPADM

## 2024-05-11 RX ORDER — ACETAMINOPHEN 325 MG/1
975 TABLET ORAL EVERY 6 HOURS PRN
Status: DISCONTINUED | OUTPATIENT
Start: 2024-05-11 | End: 2024-05-19 | Stop reason: HOSPADM

## 2024-05-11 RX ORDER — BUMETANIDE 0.25 MG/ML
3 INJECTION INTRAMUSCULAR; INTRAVENOUS ONCE
Status: COMPLETED | OUTPATIENT
Start: 2024-05-11 | End: 2024-05-11

## 2024-05-11 RX ORDER — METOCLOPRAMIDE 5 MG/1
5 TABLET ORAL 2 TIMES DAILY
Status: DISCONTINUED | OUTPATIENT
Start: 2024-05-11 | End: 2024-05-19 | Stop reason: HOSPADM

## 2024-05-11 RX ORDER — CALCITRIOL 0.25 UG/1
0.25 CAPSULE ORAL DAILY
Status: DISCONTINUED | OUTPATIENT
Start: 2024-05-11 | End: 2024-05-19 | Stop reason: HOSPADM

## 2024-05-11 RX ORDER — VANCOMYCIN HYDROCHLORIDE 1 G/20ML
INJECTION, POWDER, LYOPHILIZED, FOR SOLUTION INTRAVENOUS DAILY PRN
Status: DISCONTINUED | OUTPATIENT
Start: 2024-05-11 | End: 2024-05-12 | Stop reason: ALTCHOICE

## 2024-05-11 RX ORDER — LANOLIN ALCOHOL/MO/W.PET/CERES
400 CREAM (GRAM) TOPICAL ONCE
Status: COMPLETED | OUTPATIENT
Start: 2024-05-11 | End: 2024-05-11

## 2024-05-11 RX ORDER — PANTOPRAZOLE SODIUM 40 MG/1
40 TABLET, DELAYED RELEASE ORAL
Status: DISCONTINUED | OUTPATIENT
Start: 2024-05-12 | End: 2024-05-19 | Stop reason: HOSPADM

## 2024-05-11 RX ADMIN — NIFEDIPINE 60 MG: 60 TABLET, FILM COATED, EXTENDED RELEASE ORAL at 22:30

## 2024-05-11 RX ADMIN — HEPARIN SODIUM 5000 UNITS: 5000 INJECTION INTRAVENOUS; SUBCUTANEOUS at 13:56

## 2024-05-11 RX ADMIN — Medication 1 TABLET: at 13:55

## 2024-05-11 RX ADMIN — HYDRALAZINE HYDROCHLORIDE 100 MG: 25 TABLET ORAL at 10:17

## 2024-05-11 RX ADMIN — DEXTROSE MONOHYDRATE 12.5 G: 25 INJECTION, SOLUTION INTRAVENOUS at 17:43

## 2024-05-11 RX ADMIN — BUMETANIDE 2 MG: 0.25 INJECTION INTRAMUSCULAR; INTRAVENOUS at 09:00

## 2024-05-11 RX ADMIN — SODIUM ZIRCONIUM CYCLOSILICATE 5 G: 10 POWDER, FOR SUSPENSION ORAL at 13:56

## 2024-05-11 RX ADMIN — DEXTROSE MONOHYDRATE 30 ML/HR: 100 INJECTION, SOLUTION INTRAVENOUS at 10:25

## 2024-05-11 RX ADMIN — PIPERACILLIN SODIUM AND TAZOBACTAM SODIUM 2.25 G: 2; .25 INJECTION, SOLUTION INTRAVENOUS at 22:30

## 2024-05-11 RX ADMIN — HYDROMORPHONE HYDROCHLORIDE 0.4 MG: 1 INJECTION, SOLUTION INTRAMUSCULAR; INTRAVENOUS; SUBCUTANEOUS at 12:57

## 2024-05-11 RX ADMIN — DEXTROSE MONOHYDRATE 12.5 G: 25 INJECTION, SOLUTION INTRAVENOUS at 18:57

## 2024-05-11 RX ADMIN — METOCLOPRAMIDE 5 MG: 10 TABLET ORAL at 22:30

## 2024-05-11 RX ADMIN — PRAVASTATIN SODIUM 10 MG: 20 TABLET ORAL at 21:10

## 2024-05-11 RX ADMIN — CARVEDILOL 37.5 MG: 12.5 TABLET, FILM COATED ORAL at 12:10

## 2024-05-11 RX ADMIN — PIPERACILLIN SODIUM AND TAZOBACTAM SODIUM 2.25 G: 2; .25 INJECTION, SOLUTION INTRAVENOUS at 13:54

## 2024-05-11 RX ADMIN — HEPARIN SODIUM 5000 UNITS: 5000 INJECTION INTRAVENOUS; SUBCUTANEOUS at 22:30

## 2024-05-11 RX ADMIN — DEXTROSE MONOHYDRATE 25 G: 25 INJECTION, SOLUTION INTRAVENOUS at 06:55

## 2024-05-11 RX ADMIN — ONDANSETRON HYDROCHLORIDE 4 MG: 4 TABLET, FILM COATED ORAL at 12:15

## 2024-05-11 RX ADMIN — CARVEDILOL 37.5 MG: 12.5 TABLET, FILM COATED ORAL at 21:10

## 2024-05-11 RX ADMIN — TACROLIMUS: 1 OINTMENT TOPICAL at 17:38

## 2024-05-11 RX ADMIN — CINACALCET 30 MG: 30 TABLET, FILM COATED ORAL at 14:21

## 2024-05-11 RX ADMIN — ASPIRIN 81 MG 81 MG: 81 TABLET ORAL at 13:55

## 2024-05-11 RX ADMIN — ISOSORBIDE MONONITRATE 60 MG: 60 TABLET, EXTENDED RELEASE ORAL at 12:19

## 2024-05-11 RX ADMIN — DEXTROSE MONOHYDRATE 25 G: 25 INJECTION, SOLUTION INTRAVENOUS at 02:30

## 2024-05-11 RX ADMIN — METOCLOPRAMIDE 5 MG: 10 TABLET ORAL at 13:55

## 2024-05-11 RX ADMIN — PIPERACILLIN SODIUM AND TAZOBACTAM SODIUM 2.25 G: 2; .25 INJECTION, SOLUTION INTRAVENOUS at 03:41

## 2024-05-11 RX ADMIN — PREDNISONE 5 MG: 5 TABLET ORAL at 14:21

## 2024-05-11 RX ADMIN — DEXTROSE MONOHYDRATE 30 ML/HR: 100 INJECTION, SOLUTION INTRAVENOUS at 04:43

## 2024-05-11 RX ADMIN — HYDRALAZINE HYDROCHLORIDE 5 MG: 20 INJECTION INTRAMUSCULAR; INTRAVENOUS at 12:57

## 2024-05-11 RX ADMIN — Medication 400 MG: at 22:30

## 2024-05-11 RX ADMIN — VANCOMYCIN HYDROCHLORIDE 1000 MG: 1 INJECTION, SOLUTION INTRAVENOUS at 06:38

## 2024-05-11 RX ADMIN — TACROLIMUS 2 MG: 1 CAPSULE ORAL at 18:20

## 2024-05-11 RX ADMIN — DEXTROSE MONOHYDRATE 25 G: 25 INJECTION, SOLUTION INTRAVENOUS at 15:02

## 2024-05-11 RX ADMIN — VANCOMYCIN HYDROCHLORIDE 1000 MG: 1 INJECTION, SOLUTION INTRAVENOUS at 04:46

## 2024-05-11 RX ADMIN — NIFEDIPINE 60 MG: 60 TABLET, FILM COATED, EXTENDED RELEASE ORAL at 10:16

## 2024-05-11 RX ADMIN — HEPARIN SODIUM 5000 UNITS: 5000 INJECTION INTRAVENOUS; SUBCUTANEOUS at 09:00

## 2024-05-11 RX ADMIN — HYDRALAZINE HYDROCHLORIDE 100 MG: 25 TABLET ORAL at 21:10

## 2024-05-11 RX ADMIN — BUMETANIDE 3 MG: 0.25 INJECTION INTRAMUSCULAR; INTRAVENOUS at 13:33

## 2024-05-11 RX ADMIN — TRAMADOL HYDROCHLORIDE 50 MG: 50 TABLET, COATED ORAL at 11:13

## 2024-05-11 RX ADMIN — DEXTROSE MONOHYDRATE 25 G: 25 INJECTION, SOLUTION INTRAVENOUS at 01:16

## 2024-05-11 RX ADMIN — PERMETHRIN: 50 CREAM TOPICAL at 16:47

## 2024-05-11 RX ADMIN — CALCITRIOL CAPSULES 0.25 MCG 0.25 MCG: 0.25 CAPSULE ORAL at 15:04

## 2024-05-11 ASSESSMENT — LIFESTYLE VARIABLES
TOTAL SCORE: 0
HAVE PEOPLE ANNOYED YOU BY CRITICIZING YOUR DRINKING: NO
HAVE YOU EVER FELT YOU SHOULD CUT DOWN ON YOUR DRINKING: NO
EVER HAD A DRINK FIRST THING IN THE MORNING TO STEADY YOUR NERVES TO GET RID OF A HANGOVER: NO
EVER FELT BAD OR GUILTY ABOUT YOUR DRINKING: NO

## 2024-05-11 ASSESSMENT — PAIN SCALES - GENERAL
PAINLEVEL_OUTOF10: 5 - MODERATE PAIN
PAINLEVEL_OUTOF10: 0 - NO PAIN
PAINLEVEL_OUTOF10: 10 - WORST POSSIBLE PAIN

## 2024-05-11 ASSESSMENT — ENCOUNTER SYMPTOMS
CHILLS: 1
DIFFICULTY URINATING: 0
DIARRHEA: 0
FATIGUE: 1
SHORTNESS OF BREATH: 1
FEVER: 1
NAUSEA: 1
ABDOMINAL PAIN: 0
COUGH: 1
CONSTIPATION: 0
VOMITING: 1

## 2024-05-11 NOTE — SIGNIFICANT EVENT
We have discussed the CTA imaging with nephrology transplant team (Dr. Bridges) members of the medical team including attending physician (Dr. Peña) and have  elected to proceed with CTA chest abdomen pelvis to rule out aortic dissection given worsening nature of back pain and hypertension.  We recognize that patient's GFR is significantly decreased, however dialysis access is in place and he plans for dialysis initiation shortly.  Patient in agreement with the with this plan and was informed.

## 2024-05-11 NOTE — PROGRESS NOTES
Subjective   Manolo Bashir is a 68 y.o. male with past medical history as per housestaff note who presents now with some generalized fatigue and weakness as well as increased shortness of breath, dyspnea on exertion, orthopnea along with increased lower extremity edema and a nonproductive cough per my discussion with patient.  He may have had some low-grade fevers and chills recently.  He also describes recurrent posttussive emesis from hard coughing.  Overall it seems he does endorse that he was not nauseated but it was the recurrent hard coughing that was triggering emesis episodes.  He denied chest pain to me.  Of note because patient was feeling more edematous he changed his Bumex from twice a day to 3 times a day and states he noticed an increase in urine output although it did not help with his symptomatology.    Objective     Exam     Vitals:    05/11/24 0630 05/11/24 0700 05/11/24 0910 05/11/24 1430   BP:  (!) 180/96 (!) 210/97 (!) 185/97   BP Location:       Patient Position:       Pulse: 72 64 71 84   Resp: (!) 28 (!) 21 19 16   Temp:       TempSrc:       SpO2: (!) 90% 94% 96% (!) 93%   Weight:       Height:          Intake/Output last 3 shifts:  No intake/output data recorded.    Physical Exam  Vitals reviewed.   Constitutional:       General: He is not in acute distress.     Appearance: Normal appearance. He is not ill-appearing, toxic-appearing or diaphoretic.      Comments: Breathing smooth and calm.     HENT:      Head: Normocephalic and atraumatic.   Cardiovascular:      Rate and Rhythm: Normal rate and regular rhythm.      Pulses: Normal pulses.      Heart sounds: Murmur (2 out of 6 systolic murmur heard greatest over the left upper sternal border) heard.      No friction rub. No gallop.   Pulmonary:      Effort: Pulmonary effort is normal.      Breath sounds: Rhonchi present. No wheezing or rales.      Comments: Anteriorly  Abdominal:      General: Bowel sounds are normal. There is no  distension.      Palpations: Abdomen is soft.      Tenderness: There is no abdominal tenderness. There is no guarding or rebound.      Comments: Mild diffuse tenderness   Musculoskeletal:      Comments: Trace bilateral lower extremity edema   Skin:     General: Skin is warm and dry.   Neurological:      General: No focal deficit present.      Mental Status: He is alert and oriented to person, place, and time.      Comments: Nonfocal   Psychiatric:         Mood and Affect: Mood normal.         Behavior: Behavior normal.            Medications   aspirin, 81 mg, oral, Daily  [Held by provider] azaTHIOprine, 50 mg, oral, Daily  calcitriol, 0.25 mcg, oral, Daily  carvedilol, 37.5 mg, oral, BID  cinacalcet, 30 mg, oral, Daily  heparin (porcine), 5,000 Units, subcutaneous, q8h ZOË  hydrALAZINE, 100 mg, oral, q8h  isosorbide mononitrate ER, 60 mg, oral, Daily  metoclopramide, 5 mg, oral, BID  multivitamin with minerals, 1 tablet, oral, Daily  NIFEdipine ER, 60 mg, oral, BID  [START ON 5/12/2024] pantoprazole, 40 mg, oral, Daily before breakfast  permethrin, , Topical, Daily  piperacillin-tazobactam, 2.25 g, intravenous, q8h  pravastatin, 10 mg, oral, Nightly  predniSONE, 5 mg, oral, q AM  sodium zirconium cyclosilicate, 5 g, oral, Daily  [START ON 5/12/2024] tacrolimus, 1.5 mg, oral, q AM   And  tacrolimus, 2 mg, oral, Nightly  tacrolimus, , Topical, BID       PRN medications: acetaminophen, HYDROmorphone, ondansetron, traMADol, vancomycin       Labs     All new labs reviewed:  some of the basic labs as follows -     Results from last 7 days   Lab Units 05/11/24  0101   WBC AUTO x10*3/uL 6.1   HEMOGLOBIN g/dL 8.6*   HEMATOCRIT % 25.6*   PLATELETS AUTO x10*3/uL 132*   NEUTROS PCT AUTO % 74.1   LYMPHS PCT AUTO % 10.8   MONOS PCT AUTO % 10.7   EOS PCT AUTO % 2.1          Results from last 72 hours   Lab Units 05/11/24  0101   SODIUM mmol/L 145   POTASSIUM mmol/L 4.3   CHLORIDE mmol/L 106   CO2 mmol/L 26   BUN mg/dL 62*  "  CREATININE mg/dL 4.58*     Results from last 72 hours   Lab Units 05/11/24  0101   ALK PHOS U/L 47   AST U/L 48*   ALT U/L 48   BILIRUBIN TOTAL mg/dL 0.4   ALBUMIN g/dL 3.4   PROTEIN TOTAL g/dL 6.2*   LIPASE U/L 3*     Results from last 72 hours   Lab Units 05/11/24  0101   GLUCOSE mg/dL 49*     Results from last 72 hours   Lab Units 05/11/24  0542   LEUKOCYTES U  NEGATIVE   NITRITE U  NEGATIVE   WBC UR /HPF 1-5   RBC UR HPF /HPF 1-2   BLOOD UR  NEGATIVE     No results found for: \"TR1\"  Lab Results   Component Value Date    URINECULTURE CANCELED 07/19/2023    BLOODCULT No growth at 4 days -  FINAL REPORT 10/19/2023    BLOODCULT No growth at 4 days -  FINAL REPORT 10/19/2023    BLOODCULT  09/19/2023     No Growth at 1 days~No Growth at 2 days~No Growth at 3 days~NO GROWTH at 4 days - FINAL REPORT    BLOODCULT  09/19/2023     No Growth at 1 days~No Growth at 2 days~No Growth at 3 days~NO GROWTH at 4 days - FINAL REPORT            Imaging   XR chest 2 views  Narrative: Interpreted By:  Octavio Henriquez and Fu Tianyuan   STUDY:  XR CHEST 2 VIEWS;  5/11/2024 1:15 am      INDICATION:  Signs/Symptoms:sob, cp.      COMPARISON:  Chest radiograph 04/03/2024.      ACCESSION NUMBER(S):  [id number]      ORDERING CLINICIAN:  ENIO MORALES      FINDINGS:  PA and lateral radiographs of the chest were provided.      A vascular stent is again noted overlying the left axilla.      CARDIOMEDIASTINAL SILHOUETTE:  Cardiomediastinal silhouette is stable in size and configuration,  moderately enlarged.      LUNGS:  There are hazy and dense opacities over the right mid lung and  bilateral lung bases with blunting of the costophrenic angles.  Prominence of interstitial and perihilar markings is noted. No  evidence of pneumothorax.      ABDOMEN:  No remarkable upper abdominal findings.      BONES:  No acute osseous changes.      Impression: 1. Bilateral pleural effusions with bibasilar atelectasis or  consolidation, increased from previous " radiograph on 04/03/2024.  2. Prominent interstitial and perihilar markings may reflect  component of pulmonary edema.  3. Unchanged moderately enlarged cardiomediastinal silhouette.      I personally reviewed the images/study and I agree with the findings  as stated by resident physician Gayathri Pond MD. This study was  interpreted at [facility],  [place], [place].      MACRO:  None      Signed by: Octavio Henriquez 5/11/2024 4:18 AM  Dictation workstation:   [id number]     No results found for this or any previous visit from the past 1095 days.     Encounter Date: 04/03/24   ECG 12 lead   Result Value    Ventricular Rate 56    Atrial Rate 166    WV Interval 180    QRS Duration 140    QT Interval 464    QTC Calculation(Bazett) 447    P Axis 71    R Axis -24    T Axis 33    QRS Count 9    Q Onset 215    P Onset 125    P Offset 166    T Offset 447    QTC Fredericia 453    Narrative    Sinus tachycardia with 2nd degree AV block with 3:1 AV conduction  Nonspecific intraventricular block  Minimal voltage criteria for LVH, may be normal variant ( Levar product )  Cannot rule out Septal infarct , age undetermined  Abnormal ECG  When compared with ECG of 12-MAR-2024 05:59,  Sinus rhythm is now with 2nd degree AV block  See ED provider note for full interpretation and clinical correlation  Confirmed by Patricia Harry ([id number]) on 4/3/2024 8:27:50 PM        Assessment and Plan     Shortness of breath: Likely related to worsening volume overload in the setting of declining renal function.  Patient has had multiple admissions in the recent past for this same complaint.  Typically IV diuresis helps resolve his problems. Possible hap.  Patient does not have leukocytosis but his white count is higher than what it has been recently in the setting of his pancytopenia.  No significant left shift otherwise 1.8% immature granulocytes.  Procalcitonin may not be of help in setting of near end-stage  renal disease  -Continue diuretics as guided by transplant nephrology.  For now we will continue IV Bumex  -Attempt to monitor strict ins and outs and daily weights  -Continue IV antibiotics (Zosyn and vancomycin).  Monitor vancomycin per protocol  -Follow-up blood cultures and sputum culture if able to provide sample  -Checking Legionella and strep antigens  -pulmonary tioleting.  Incentive spirometer ~10x/hr while awake and flutter valve therapy  -guaifenesin and tessalon perles vs dextromethorphan     Renal transplant: With CKD stage V approaching dialysis.  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  As below patient will be getting contrasted study although benefits outweigh the risks as patient is very to start hemodialysis as outpatient regardless of contrasted study.  Contrast may slightly accelerate his need to transition to dialysis.  Cannot hydrate right now because patient is overloaded  -For now we will continue immunosuppression regimen over directed by transplant service (tacrolimus, azathioprine, and prednisone).  Holding azathioprine in the setting of possible pneumonia  -Monitor tacrolimus level daily  -Continue home Sensipar and vitamin D3/calcitriol  -Follow-up transplant nephrology recommendations  -Monitor renal function panel    Cardiovascular: Blood pressure markedly elevated in the emergency room.  It appears he had not been getting any blood pressure medicine since arrival.  Later after rounds patient started developing chest pain going to his back in the setting of the significant hypertension along with some nausea and worsening shortness of breath.  Possible flash pulmonary edema as patient was noted to be much more crackly at this time.  Of note patient does have a lot of chronic back pain although typically lower.  He also does appear to have some anxiety issues with his medical ailments.  -Continue home carvedilol,  hydralazine, nifedipine, and Imdur  -Patient may need additional agent such as clonidine  -Ideally utilize IV labetalol.  -Plan for stat CT angio to rule out any dissection.  If pressure does not improve or dissection noted you may need IV blood pressure control with agents such as nicardipine and ICU admission  -Continue statin and aspirin  -Follow-up troponins.  EKG appeared to be normal sinus rhythm with some LVH and repull changes on the repeat.  Initial EKG    Pancytopenia: Iron is low mildly elevated ferritin and low percent sat.  Folate and B12 are normal  -Replete iron  -Check iron and ferritin  -Nephrology considering Aranesp    Diabetes mellitus: A1c from 1/24 is 7.7.  Patient presented with hypoglycemia in the setting of poor p.o. intake and vomiting with food he was taking in the setting of heavy coughing.  Patient does endorse he did continue to take his home insulin as directed despite alterations to oral intake  -Wean off D10 as possible  -Holding home insulin at this time    GI:  -Continue PPI  -Bowel care    DVT prophylaxis    Daniel Peña MD     Of note the above was done with Dragon dictation system.  Note was proofread to minimize errors.

## 2024-05-11 NOTE — HOSPITAL COURSE
Admitted 5/11. Received 2 mg Bumex IV and continued home Hydralazine and Nifedipine. Transplant nephrology consulted 5/11 d/t h/o 2 renal transplants on immunosuppressants. A few hours later, pt had an episode of HTN to 220s/80s w/ HA, severe ABD pain, nausea, vomiting, increased back pain, tachypnea to the 30s and increased crackles on respiratory exam. Gave Zofran and one-time dilaudid for pain control. Started his home Coreg and Indur + IV hydralazine 5 mg. Ordered EKG, which was unremarkable compared with most recent previous, and VBG, which was normal. After these measures pt resting more comfortably on supplemental O2 NC, though still tachypneic with shallow breaths. Discussed additional diuresis with transplant nephrologist who recommended giving Bumex 3 mg in the context of likely flash pulmonary edema. CTCAP w/ contrast negative for aortic dissection. BP improved and sx resolved by 5/12.   Pt w/ some confusion and SOB when seen by nephrology 5/13; started HD. Tapering immunosuppressants per transplant nephrology d/t failed transplant. Restarted ABX (vancomycin/zosyn) for HAP d/t low grade fever 5/14, resolved. Discontinued IV Vancomycin 5/16 and started PO Augmentin. Pt w/ nausea and vomiting 5/17 that persisted after Zofran. Ordered ABD XR d/t concern for gastroparesis given pt's h/o DMII, but it showed no evidence of obstruction. N/V resolved on scheduled Reglan 5 mg BID. Pt had an episode of hypoglycemia to 22 5/17 PM; basal insulin reduced to 4 units, but pt never received this dose as he refused insulin the next 2 days. His POCT glucose ranged from  during this time. Pt then desatted to 88% overnight 5/17 while sleeping. O2 sat has since remained above 92% on 2L O2. Ordered CXR 5/19 d/t pt continuing to c/o cough; shows improved pulmonary edema since comparison 5/11 CXR, and continued radiologic evidence of RLL pneumonia. Discharge on remaining 4 days of PO Augmentin. Outpatient dialysis TTS and  follow up with transplant nephrology. Follow up with PCP for adjustment of insulin regimen and sleep study to evaluate for sleep apnea.

## 2024-05-11 NOTE — H&P
History Of Present Illness  Manolo Bashir is a 68 [facility]. [facility] ([facility], 2013 on prednisone, tacrolimus, azathioprine, baseline Cr ~5.5), prostate cancer s/p radical prostatectomy, DM2 on insulin (A1c: 7.7), MGUS, HTN presenting via EMS with weakness and SOB.     HPI  Pt began feeling sick 2 days ago. Reports fever, chills, and dry cough.  Had 4-5 episodes of non-bloody vomiting d/t forceful coughing 1 day ago, and admits poor PO intake. Pt then began feeling weak and SOB, especially while supine, and his wife called EMS to bring him to the hospital. Also admits increased BLE edema; states he increased his home Bumex from BID to TID as a result, and noticed increased UOP, to no relief of sx. He denies LOC, falls, CP, constipation, ABD pain, diarrhea, and recent sick contacts. However, was admitted 1 month ago 4/3/24 for orthostatic HTN and dizziness. He is not currently on dialysis, but access was created 3/2024 in the left arm.     Per EMS, pt was hypoglycemic to 40 on their arrival. He was started on d10 and BG improved to 122. Once in the ED, he was hypoglycemic to 49 around 1 AM. 25 g D50 was given twice and D10W was started at 50 mL/hr. However at 7AM, he was peristently hypoglycemic to 51. Another dose of D50 was pushed and repeat POCT glucose showed improvement to 123. He is on long and short acting insulin at home; his home BG is normally around 100 at the lowest; does not usually struggle with hypoglycemia at home.     ED Course  Arrived to ED mildly tachypneic but satting well on RA.   CXR showed increased interstitial markings and an area of consolidation the RLL concerning for pneumonia. Vancomycin and Zosyn were started empirically. Pt persistently hypoglycemic, required multiple D50 amps and was started on a D10 drip. ECG nonischemic, troponin with delta negative with overall low suspicion for ACS.     Past Medical History  Past Medical History:   Diagnosis Date     Anemia     Arthritis     Cataract     Chronic kidney disease, stage 3 unspecified (Multi) 09/26/2018    Stage 3 chronic kidney disease    CKD (chronic kidney disease)     stage V    COVID-19 06/18/2020    COVID-19 virus infection    Diabetes (Multi)     ESRD (end stage renal disease) (Multi)     Focal and segmental proliferative glomerulonephritis 12/23/2023    HTN (hypertension)     Hyperlipidemia     Other long term (current) drug therapy 07/20/2021    High risk medication use    Personal history of other diseases of the circulatory system     Personal history of cardiac murmur    Personal history of other infectious and parasitic diseases 08/17/2015    History of hepatitis    Polyp, colonic 08/17/2023    Primary osteoarthritis of both ankles 08/17/2023    Prostate cancer (Multi)     Tubular adenoma of colon 08/17/2023    Unspecified kidney failure 08/17/2016    Renal failure       Surgical History  Past Surgical History:   Procedure Laterality Date    ILEOSTOMY  04/25/2017    Ileostomy    ILEOSTOMY CLOSURE  08/17/2015    Ileostomy Closure    OTHER SURGICAL HISTORY  04/21/2017    Right Hemicolectomy    OTHER SURGICAL HISTORY  08/17/2015    Arteriovenous Surgery Creation Of A-V Fistula    OTHER SURGICAL HISTORY  08/17/2015    Sigmoidoscopy (Fiberoptic, Therapeutic )    PROSTATECTOMY  10/11/2013    Prostatectomy Radical    TRANSPLANT, KIDNEY, OPEN  1992    TRANSPLANT, KIDNEY, OPEN  2013    US GUIDED PERCUTANEOUS BIOPSY RENAL LEFT Left 11/20/2023    US GUIDED PERCUTANEOUS BIOPSY RENAL LEFT 11/20/2023 Lou Rodgers MD [facility]    US GUIDED PERCUTANEOUS PERITONEAL OR RETROPERITONEAL FLUID COLLECTION DRAINAGE  10/20/2022    US GUIDED PERCUTANEOUS PERITONEAL OR RETROPERITONEAL FLUID COLLECTION DRAINAGE 10/20/2022 [facility]        Social History  He reports that he has never smoked. He has been exposed to tobacco smoke. He has never used smokeless tobacco. He reports current alcohol use. He reports current drug  "use. Drug: Oxycodone.    Family History  Family History   Problem Relation Name Age of Onset    Bone cancer Mother      Other (corona's sarcome of the bone marrow) Mother      Prostate cancer Father      Diabetes Other Family Hist     Hypertension Other Family Hist         Allergies  Patient has no known allergies.    Review of Systems   Constitutional:  Positive for chills, fatigue and fever.   Respiratory:  Positive for cough and shortness of breath.    Cardiovascular:  Positive for leg swelling. Negative for chest pain.   Gastrointestinal:  Positive for nausea and vomiting. Negative for abdominal pain, constipation and diarrhea.   Genitourinary:  Negative for difficulty urinating.        Physical Exam  Constitutional:       Appearance: Normal appearance. He is ill-appearing.   HENT:      Head: Normocephalic and atraumatic.      Nose: No congestion or rhinorrhea.   Eyes:      Extraocular Movements: Extraocular movements intact.      Pupils: Pupils are equal, round, and reactive to light.   Cardiovascular:      Rate and Rhythm: Normal rate and regular rhythm.   Pulmonary:      Breath sounds: Rhonchi and rales present.      Comments: Coarse bibasilar crackles  Abdominal:      General: Abdomen is flat.      Palpations: Abdomen is soft.      Tenderness: There is abdominal tenderness.   Musculoskeletal:      Right lower leg: Edema present.      Left lower leg: Edema present.      Comments: 2-3+ BLE edema to below knees  AV Fistula with palpable thrill in LUE   Skin:     General: Skin is warm and dry.      Capillary Refill: Capillary refill takes less than 2 seconds.   Neurological:      General: No focal deficit present.      Mental Status: He is alert.   Psychiatric:         Mood and Affect: Mood normal.         Behavior: Behavior normal.          Last Recorded Vitals  Blood pressure 158/77, pulse 76, temperature 36.7 °C (98.1 °F), resp. rate 20, height 1.727 m (5' 8\"), weight 77.1 kg (170 lb), SpO2 96%.    Relevant " Results  Scheduled medications  aspirin, 81 mg, oral, Daily  [Held by provider] azaTHIOprine, 50 mg, oral, Daily  calcitriol, 0.25 mcg, oral, Daily  carvedilol, 37.5 mg, oral, BID  cinacalcet, 30 mg, oral, Daily  heparin (porcine), 5,000 Units, subcutaneous, q8h ZOË  hydrALAZINE, 100 mg, oral, q8h  isosorbide mononitrate ER, 60 mg, oral, Daily  metoclopramide, 5 mg, oral, BID  multivitamin with minerals, 1 tablet, oral, Daily  NIFEdipine ER, 60 mg, oral, BID  [START ON 5/12/2024] pantoprazole, 40 mg, oral, Daily before breakfast  permethrin, , Topical, Daily  piperacillin-tazobactam, 2.25 g, intravenous, q8h  pravastatin, 10 mg, oral, Nightly  predniSONE, 5 mg, oral, q AM  sodium zirconium cyclosilicate, 5 g, oral, Daily  [START ON 5/12/2024] tacrolimus, 1.5 mg, oral, q AM   And  tacrolimus, 2 mg, oral, Nightly  tacrolimus, , Topical, BID      Continuous medications       PRN medications  PRN medications: acetaminophen, HYDROmorphone, ondansetron, traMADol, vancomycin  Results for orders placed or performed during the hospital encounter of 05/11/24 (from the past 24 hour(s))   Blood Gas Venous Full Panel Unsolicited   Result Value Ref Range    POCT pH, Venous 7.45 (H) 7.33 - 7.43 pH    POCT pCO2, Venous 43 41 - 51 mm Hg    POCT pO2, Venous 54 (H) 35 - 45 mm Hg    POCT SO2, Venous 86 (H) 45 - 75 %    POCT Oxy Hemoglobin, Venous 83.2 (H) 45.0 - 75.0 %    POCT Hematocrit Calculated, Venous 27.0 (L) 41.0 - 52.0 %    POCT Sodium, Venous 140 136 - 145 mmol/L    POCT Potassium, Venous 4.3 3.5 - 5.3 mmol/L    POCT Chloride, Venous 111 (H) 98 - 107 mmol/L    POCT Ionized Calicum, Venous 1.36 (H) 1.10 - 1.33 mmol/L    POCT Glucose, Venous 60 (L) 74 - 99 mg/dL    POCT Lactate, Venous 0.9 0.4 - 2.0 mmol/L    POCT Base Excess, Venous 5.4 (H) -2.0 - 3.0 mmol/L    POCT HCO3 Calculated, Venous 29.9 (H) 22.0 - 26.0 mmol/L    POCT Hemoglobin, Venous 8.9 (L) 13.5 - 17.5 g/dL    POCT Anion Gap, Venous 3.0 (L) 10.0 - 25.0 mmol/L     Patient Temperature 37.0 degrees Celsius   CBC and Auto Differential   Result Value Ref Range    WBC 6.1 4.4 - 11.3 x10*3/uL    nRBC 0.0 0.0 - 0.0 /100 WBCs    RBC 2.92 (L) 4.50 - 5.90 x10*6/uL    Hemoglobin 8.6 (L) 13.5 - 17.5 g/dL    Hematocrit 25.6 (L) 41.0 - 52.0 %    MCV 88 80 - 100 fL    MCH 29.5 26.0 - 34.0 pg    MCHC 33.6 32.0 - 36.0 g/dL    RDW 14.9 (H) 11.5 - 14.5 %    Platelets 132 (L) 150 - 450 x10*3/uL    Neutrophils % 74.1 40.0 - 80.0 %    Immature Granulocytes %, Automated 1.8 (H) 0.0 - 0.9 %    Lymphocytes % 10.8 13.0 - 44.0 %    Monocytes % 10.7 2.0 - 10.0 %    Eosinophils % 2.1 0.0 - 6.0 %    Basophils % 0.5 0.0 - 2.0 %    Neutrophils Absolute 4.51 1.20 - 7.70 x10*3/uL    Immature Granulocytes Absolute, Automated 0.11 0.00 - 0.70 x10*3/uL    Lymphocytes Absolute 0.66 (L) 1.20 - 4.80 x10*3/uL    Monocytes Absolute 0.65 0.10 - 1.00 x10*3/uL    Eosinophils Absolute 0.13 0.00 - 0.70 x10*3/uL    Basophils Absolute 0.03 0.00 - 0.10 x10*3/uL   Comprehensive metabolic panel   Result Value Ref Range    Glucose 49 (LL) 74 - 99 mg/dL    Sodium 145 136 - 145 mmol/L    Potassium 4.3 3.5 - 5.3 mmol/L    Chloride 106 98 - 107 mmol/L    Bicarbonate 26 21 - 32 mmol/L    Anion Gap 17 10 - 20 mmol/L    Urea Nitrogen 62 (H) 6 - 23 mg/dL    Creatinine 4.58 (H) 0.50 - 1.30 mg/dL    eGFR 13 (L) >60 mL/min/1.73m*2    Calcium 9.4 8.6 - 10.6 mg/dL    Albumin 3.4 3.4 - 5.0 g/dL    Alkaline Phosphatase 47 33 - 136 U/L    Total Protein 6.2 (L) 6.4 - 8.2 g/dL    AST 48 (H) 9 - 39 U/L    Bilirubin, Total 0.4 0.0 - 1.2 mg/dL    ALT 48 10 - 52 U/L   Magnesium   Result Value Ref Range    Magnesium 2.09 1.60 - 2.40 mg/dL   Phosphorus   Result Value Ref Range    Phosphorus 2.3 (L) 2.5 - 4.9 mg/dL   B-Type Natriuretic Peptide   Result Value Ref Range     (H) 0 - 99 pg/mL   Troponin I, High Sensitivity, Initial   Result Value Ref Range    Troponin I, High Sensitivity 41 0 - 53 ng/L   Amylase   Result Value Ref Range    Amylase  14 (L) 29 - 103 U/L   Lipase   Result Value Ref Range    Lipase 3 (L) 9 - 82 U/L   Iron and TIBC   Result Value Ref Range    Iron 22 (L) 35 - 150 ug/dL    UIBC 149 110 - 370 ug/dL    TIBC 171 (L) 240 - 445 ug/dL    % Saturation 13 (L) 25 - 45 %   POCT GLUCOSE   Result Value Ref Range    POCT Glucose 40 (L) 74 - 99 mg/dL   Troponin, High Sensitivity, 1 Hour   Result Value Ref Range    Troponin I, High Sensitivity 47 0 - 53 ng/L   POCT GLUCOSE   Result Value Ref Range    POCT Glucose 69 (L) 74 - 99 mg/dL   MRSA Surveillance for Vancomycin De-escalation, PCR    Specimen: Anterior Nares; Swab   Result Value Ref Range    MRSA PCR Not Detected Not Detected   Urinalysis with Reflex Culture and Microscopic   Result Value Ref Range    Color, Urine Light-Yellow Light-Yellow, Yellow, Dark-Yellow    Appearance, Urine Clear Clear    Specific Gravity, Urine 1.012 1.005 - 1.035    pH, Urine 6.0 5.0, 5.5, 6.0, 6.5, 7.0, 7.5, 8.0    Protein, Urine 100 (2+) (A) NEGATIVE, 10 (TRACE), 20 (TRACE) mg/dL    Glucose, Urine Normal Normal mg/dL    Blood, Urine NEGATIVE NEGATIVE    Ketones, Urine NEGATIVE NEGATIVE mg/dL    Bilirubin, Urine NEGATIVE NEGATIVE    Urobilinogen, Urine Normal Normal mg/dL    Nitrite, Urine NEGATIVE NEGATIVE    Leukocyte Esterase, Urine NEGATIVE NEGATIVE   Urinalysis Microscopic   Result Value Ref Range    WBC, Urine 1-5 1-5, NONE /HPF    RBC, Urine 1-2 NONE, 1-2, 3-5 /HPF    Squamous Epithelial Cells, Urine 1-9 (SPARSE) Reference range not established. /HPF    Bacteria, Urine 4+ (A) NONE SEEN /HPF    Mucus, Urine FEW Reference range not established. /LPF    Hyaline Casts, Urine 3+ (A) NONE /LPF   POCT GLUCOSE   Result Value Ref Range    POCT Glucose 54 (L) 74 - 99 mg/dL   POCT GLUCOSE   Result Value Ref Range    POCT Glucose 51 (L) 74 - 99 mg/dL   POCT GLUCOSE   Result Value Ref Range    POCT Glucose 123 (H) 74 - 99 mg/dL   POCT GLUCOSE   Result Value Ref Range    POCT Glucose 97 74 - 99 mg/dL   Troponin I, High  Sensitivity   Result Value Ref Range    Troponin I, High Sensitivity 53 0 - 53 ng/L   Ferritin   Result Value Ref Range    Ferritin 602 (H) 20 - 300 ng/mL   POCT GLUCOSE   Result Value Ref Range    POCT Glucose 64 (L) 74 - 99 mg/dL     XR chest 2 views    Result Date: 5/11/2024  Interpreted By:  Octavio Henriquez,  and Salty Trinh STUDY: XR CHEST 2 VIEWS;  5/11/2024 1:15 am   INDICATION: Signs/Symptoms:sob, cp.   COMPARISON: Chest radiograph 04/03/2024.   ACCESSION NUMBER(S): [id number]   ORDERING CLINICIAN: ENIO MORALES   FINDINGS: PA and lateral radiographs of the chest were provided.   A vascular stent is again noted overlying the left axilla.   CARDIOMEDIASTINAL SILHOUETTE: Cardiomediastinal silhouette is stable in size and configuration, moderately enlarged.   LUNGS: There are hazy and dense opacities over the right mid lung and bilateral lung bases with blunting of the costophrenic angles. Prominence of interstitial and perihilar markings is noted. No evidence of pneumothorax.   ABDOMEN: No remarkable upper abdominal findings.   BONES: No acute osseous changes.       1. Bilateral pleural effusions with bibasilar atelectasis or consolidation, increased from previous radiograph on 04/03/2024. 2. Prominent interstitial and perihilar markings may reflect component of pulmonary edema. 3. Unchanged moderately enlarged cardiomediastinal silhouette.   I personally reviewed the images/study and I agree with the findings as stated by resident physician Gayathri Pond MD. This study was interpreted at [facility], [place], [place].   MACRO: None   Signed by: Octavio Henriquez 5/11/2024 4:18 AM Dictation workstation:   [id number]      Assessment/Plan   Principal Problem:    Shortness of breath    Manolo Bashir is a 67 y/o M w/ PMH significant for ESRD s/p 2 renal transplants (1992, 2013 on prednisone, tacrolimus, azathioprine, baseline Cr ~5.5), prostate cancer s/p radical prostatectomy, DM2  on insulin (A1c: 7.7), MGUS, HTN presenting via EMS with weakness and SOB. Hypervolemic with bibasilar crackles, pitting BLE edema and elevated BNP; still making good urine in setting of failing renal transplant. Found to have HFpEF on TTE 4/2024. Diuresed with 5 mg total of Bumex. He is currently stable on 1L O2 after an episode of hypertensive urgency accompanied by flash pulmonary edema in the ED. Currently treating empirically for right-sided pneumonia identified on CXR, likely hospital acquired from admission about a month ago. Persistent hypoglycemia likely d/t effects of long-acting insulin combined with poor PO intake d/t nausea.      #Pneumonia  ::CXR 5/11: There are hazy and dense opacities over the right mid lung and bilateral lung bases with blunting of the costophrenic angles.   1. Bilateral pleural effusions with bibasilar atelectasis or consolidation, increased from previous radiograph on 04/03/2024. 2. Prominent interstitial and perihilar markings may reflect component of pulmonary edema. 3. Unchanged moderately enlarged cardiomediastinal silhouette.   ::Likely hospital acquired in the setting of admission 1 month ago  - Currently satting well on 1L O2; not on O2 at home   - Continue Vancomycin (dosed by pharmacy) and Zosyn   - Follow up sputum cultures and respiratory viral panel, pending  - Wean O2 as tolerated     #ESRD s/p renal transplant (X2)  :: Renal transplants in 1992 and 2013  ::B/l Cr 5.5  :: Dialysis access created 3/2024 in LUE with palpable thrill   :: TTE 4/2024 EF 54%  - Transplant nephrology following   - Pt with bibasilar crackles and BLE edema on exam and BNP elevated to 855, suggesting hypervolemic state  - IV Bumex 2 mg given once  - Continue home Nifedipine, Hydralazine, Coreg and Indur  - Pt w/ episode of hypertensive urgency and flash pulmonary edema 5/11 afternoon with HA, SOB, severe ABD pain, nausea, and vomiting  - Gave IV Bumex 3 mg  - Diuresis goal: net -1 to -2 L  -  Hold aziathroprine in setting of pneumonia per transplant nephrology recs   - Continue home prednisone 5 mg daily  - Continue home tacrolimus at 6:30 AM/6:30 PM  - Measure daily tacrolimus level before AM dose; target 4-6  - Follow up PM RFP and Magnesium d/t diuresis today   - Strict I&Os  - Daily weights     #Hypertensive urgency  #Chronic HTN  Blood Pressures         5/11/2024  0500 5/11/2024  0530 5/11/2024  0700 5/11/2024  0910 5/11/2024  1430    BP: 172/89 163/86 180/96 210/97 185/97           :: H/o multiple prior admissions for hypervolemia and hypertensive urgency   - Restarted home Hydralazine, Nifedipine, Coreg, and Indur  - Pt w/ episode of hypertensive urgency and flash pulmonary edema 5/11 afternoon with HA, SOB, severe ABD pain, nausea, and vomiting  - Gave Dilaudid 0.4 mg once for severe pain  - Administered Zofran for N/V  - Gave IV Hydralazine 5 mg  - Ordered bedside EKG; not significantly changed from previous  - VBG wnl   - Follow up CTCAP ordered to r/o aortic dissection   - CTM BP    #DMII  :: A1C 7.7 3 months ago   :: Home regimen: insulin glargine 20 units daily and Humalog TID with meals: 100-199 2 units, 200-299 4 units, 300-399 6 units  - Q2 hour POC glucose d/t persistent hypoglycemia  -  Hold insulin in setting of persistent hypoglycemia and continued poor PO intake    - D50 amp PRN to correct hypoglycemia     #H/o head lice  - Was prescribed permethrin cream 3/20/24  - Continue permethrin cream       VTE ppx: SubQ Heparin 5000 units q8  Diet: Adult Renal   PPX: Pantoprozole 40 mg  Bowel regimen: -  Code Status: Full code   Contact Number: Amalia Enriquez (friend) [phone]  Dispo: EZEKIEL Mcpherson

## 2024-05-11 NOTE — ED PROVIDER NOTES
HPI:  68-year-old male history of prior ESRD on HD now s/p 2 time renal transplant (1992, 2013), now with impaired allograft function not currently on HD, CKD 3, on immunosuppression (prednisone, tacrolimus, azathioprine), history of IgG and IgA lambda MGUS (recent hematologic eval including Bmbx with no e/o myeloma), DM2, HTN, prostate cancer s/p radical prostatectomy, baseline urinary incontinence, HCV s/p rx (PCR neg 10/2023) presenting to the ED with generalized fatigue and shortness of breath.  Patient endorsing progressive worsening shortness of breath, worse with exertion and when lying flat, additionally endorsing a cough productive of sputum.  Patient denies chest pain, does endorse some nausea and 1 episode of nonbloody vomiting.  Poor oral intake due to nausea.  Per EMS reports patient hypoglycemic with a glucose of 40, started on D10 with improvement.    ------------------------------------------------------------------------------------------------------------------------------------------  Physical Exam:    VS: As documented in the triage note and EMR flowsheet from this visit were reviewed.  General: Chronically ill-appearing. No acute distress.   Eyes: Pupils round and reactive. No scleral icterus.   HENT: Atraumatic. Normocephalic. Moist mucous membranes.   CV: Regular rate, regular rhythm.  Bilateral lower extremity nonpitting edema  Resp: Diminished breath sounds bilateral lung bases, mild tachypnea, without wheezing or crackles, on RA  GI: Soft, nondistended.  Mild generalized tenderness to palpation without rebound or guarding, no peritoneal signs, no CVA TTP.  : No testicular swelling, no penile swelling.  Genital warts present with skin intact, no surrounding erythema or drainage  Skin: Warm, dry, intact. No systemic rashes or lesions appreciated.  Neuro: Alert. No focal neuro deficits observed. Speech fluent. Answers questions appropriately.   Psych: Appropriate.  Anthony.    ------------------------------------------------------------------------------------------------------------------------------------------    Medical Decision Making:  ED Course as of 05/11/24 0739   Sat May 11, 2024   0026 ECG 12 lead  Sinus arrhythmia rate 57, left axis deviation, LBBB.  No acute ST segment elevation or depression [KR]   0420 XR chest 2 views  Chest x-ray showing bilateral pleural effusions, focal consolidation in the right lower lobe which in the setting of patient's immunosuppressive status, productive cough, shortness of breath is concerning for pneumonia.  Treating broadly with vancomycin and Zosyn.  Additionally patient has increased interstitial markings concerning for volume overload, currently stable on room air but will continue to evaluate. [KR]      ED Course User Index  [KR] Mary López DO         Diagnoses as of 05/11/24 0739   Shortness of breath   Pneumonia due to infectious organism, unspecified laterality, unspecified part of lung   Hypervolemia, unspecified hypervolemia type   Hypoglycemia     68-year-old male with complex medical history noted above presenting to ED with generalized weakness and shortness of breath.  Afebrile, nontoxic-appearing on arrival with bradycardia.  Mildly tachypneic but satting appropriately on room air, obtain chest x-ray which shows increased interstitial markings as well as an area of consolidation in the right lower lobe concerning for pneumonia in the setting of patient's cough, shortness of breath and immunosuppressed status.  Covered with vancomycin and Zosyn.  Additionally patient is persistently hypoglycemic, required multiple D50 amps and was started on a D10 drip.  Patient is on long and short acting insulin and has had decreased p.o.'s to consider this but may warrant further workup inpatient.  Patient additionally does show some evidence of volume overload with a BNP increased from baseline, reduced renal function may benefit  from gentle diuresis as inpatient versus need to start IHD, has a left upper extremity fistula with thrill.  ECG nonischemic, troponin with delta negative with overall low suspicion for ACS.  Given patient's immunocompromise status in the setting of pneumonia with signs of volume overload and persistent hypoglycemia he was admitted to medicine for further management.    Differential Diagnoses Considered:  See MDM     Chronic Medical Conditions Significantly Affecting Care:  See MDM     External Records Reviewed:  I reviewed recent and relevant outside records as noted in HPI above and MDM.      Social Determinants of Health Significantly Affecting Care:  See HPI/MDM     Prescription Drug Consideration:  See MDM     Diagnostic testing considered:  See MDM and relevant sections      Mary López DO  EM PGY-2     Mary López DO  Resident  05/11/24 0655

## 2024-05-11 NOTE — CONSULTS
INPATIENT INITIAL TRANSPLANT NEPHROLOGY CONSULT          SERVICE DATE: 5/11/2024   SERVICE TIME:  1:29 PM    REASON FOR CONSULT:  Immunosuppressive medication management and nephrology related issues.    REQUESTING PHYSICIAN: Surinder Oliva*  PRIMARY CARE PHYSICIAN: ANÍBAL Armas-CNP, DNP    ADMISSION DIAGNOSIS:   1. Shortness of breath    2. Pneumonia due to infectious organism, unspecified laterality, unspecified part of lung    3. Hypervolemia, unspecified hypervolemia type    4. Hypoglycemia        TRANSPLANT DATE: 7/13/2013 (Kidney), 3/26/1994 (Kidney)    BLOOD TYPE: O    HPI:    Mr. Bashri is a 68 y.o. male with past medical history significant for ESRD s/p 1st DDKT in 1992 and 2nd DDKT in 2013 which is failing. He also has history include MGUS with IgG and IgA lambda, diabetes, hypertension, prostate cancer status post radical prostatectomy, urinary incontinence, HCV s/p treatment.    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  Patient received 2 doses of rituximab as of 1/7/2024.    He has had several hospital admissions for fluid overload, hypertensive emergency.    Mr. Bashir presented to ER today for weakness and SOB, chrissy while supine; denies CP. Per EMS; BG 40; started pt on d10 and BG came up to 122. Hypoglycemic to 49 at 1 AM and 51 6:40 AM; not currently eating     Transplant nephrology is consulted to assist with immunosuppressive medication management and nephrology related issues.   He appears to be in respiratory distress, hypertensive urgency and having abdominal pain with N/V. Primary team is evaluating at bedside and will get CT C/A/P to rule out aortic dissection.    Noted LUE AVG placed 3/21/2024 and can be used if there is an indication for HD.       REVIEW OF SYSTEM:  Review of system was done  system by system (10/14). Apart from HPI, other symptoms were negative.    PAST MEDICAL HISTORY:  Past Medical History:   Diagnosis Date    Anemia     Arthritis     Cataract     Chronic kidney disease, stage 3 unspecified (Multi) 09/26/2018    Stage 3 chronic kidney disease    CKD (chronic kidney disease)     stage V    COVID-19 06/18/2020    COVID-19 virus infection    Diabetes (Multi)     ESRD (end stage renal disease) (Multi)     Focal and segmental proliferative glomerulonephritis 12/23/2023    HTN (hypertension)     Hyperlipidemia     Other long term (current) drug therapy 07/20/2021    High risk medication use    Personal history of other diseases of the circulatory system     Personal history of cardiac murmur    Personal history of other infectious and parasitic diseases 08/17/2015    History of hepatitis    Polyp, colonic 08/17/2023    Primary osteoarthritis of both ankles 08/17/2023    Prostate cancer (Multi)     Tubular adenoma of colon 08/17/2023    Unspecified kidney failure 08/17/2016    Renal failure        PAST SURGICAL HISTORY:  Past Surgical History:   Procedure Laterality Date    ILEOSTOMY  04/25/2017    Ileostomy    ILEOSTOMY CLOSURE  08/17/2015    Ileostomy Closure    OTHER SURGICAL HISTORY  04/21/2017    Right Hemicolectomy    OTHER SURGICAL HISTORY  08/17/2015    Arteriovenous Surgery Creation Of A-V Fistula    OTHER SURGICAL HISTORY  08/17/2015    Sigmoidoscopy (Fiberoptic, Therapeutic )    PROSTATECTOMY  10/11/2013    Prostatectomy Radical    TRANSPLANT, KIDNEY, OPEN  1992    TRANSPLANT, KIDNEY, OPEN  2013    US GUIDED PERCUTANEOUS BIOPSY RENAL LEFT Left 11/20/2023    US GUIDED PERCUTANEOUS BIOPSY RENAL LEFT 11/20/2023 Lou Rodgers MD [facility]    US GUIDED PERCUTANEOUS PERITONEAL OR RETROPERITONEAL FLUID COLLECTION DRAINAGE  10/20/2022    US GUIDED PERCUTANEOUS PERITONEAL OR RETROPERITONEAL FLUID COLLECTION DRAINAGE 10/20/2022 [facility] CLINICAL LEGAC[facility]        SOCIAL HISTORY:  Social History      Socioeconomic History    Marital status: Single     Spouse name: Not on file    Number of children: Not on file    Years of education: Not on file    Highest education level: Not on file   Occupational History    Not on file   Tobacco Use    Smoking status: Never     Passive exposure: Past    Smokeless tobacco: Never   Vaping Use    Vaping status: Never Used   Substance and Sexual Activity    Alcohol use: Yes    Drug use: Yes     Types: Oxycodone    Sexual activity: Defer   Other Topics Concern    Not on file   Social History Narrative    Not on file     Social Determinants of Health     Financial Resource Strain: Low Risk  (4/4/2024)    Overall Financial Resource Strain (CARDIA)     Difficulty of Paying Living Expenses: Not hard at all   Food Insecurity: No Food Insecurity (10/3/2023)    Hunger Vital Sign     Worried About Running Out of Food in the Last Year: Never true     Ran Out of Food in the Last Year: Never true   Transportation Needs: No Transportation Needs (4/4/2024)    PRAPARE - Transportation     Lack of Transportation (Medical): No     Lack of Transportation (Non-Medical): No   Physical Activity: Unknown (4/4/2024)    Exercise Vital Sign     Days of Exercise per Week: Patient unable to answer     Minutes of Exercise per Session: 30 min   Stress: No Stress Concern Present (10/3/2023)    [employer] - Occupational Stress Questionnaire     Feeling of Stress : Not at all   Social Connections: Unknown (10/3/2023)    Social Connection and Isolation Panel [NHANES]     Frequency of Communication with Friends and Family: More than three times a week     Frequency of Social Gatherings with Friends and Family: Twice a week     Attends [employer] Services: Patient declined     Active Member of Clubs or Organizations: Patient declined     Attends Club or Organization Meetings: Patient declined     Marital Status: Never    Intimate Partner Violence: Not At Risk (10/3/2023)     "Humiliation, Afraid, Rape, and Kick questionnaire     Fear of Current or Ex-Partner: No     Emotionally Abused: No     Physically Abused: No     Sexually Abused: No   Housing Stability: Low Risk  (4/4/2024)    Housing Stability Vital Sign     Unable to Pay for Housing in the Last Year: No     Number of Places Lived in the Last Year: 1     Unstable Housing in the Last Year: No       FAMILY HISTORY:  Family History   Problem Relation Name Age of Onset    Bone cancer Mother      Other (corona's sarcome of the bone marrow) Mother      Prostate cancer Father      Diabetes Other Family Hist     Hypertension Other Family Hist        MEDICATION LIST:  aspirin, 81 mg, Daily  [Held by provider] azaTHIOprine, 50 mg, Daily  bumetanide, 3 mg, Once  calcitriol, 0.25 mcg, Daily  carvedilol, 37.5 mg, BID  cinacalcet, 30 mg, Daily  heparin (porcine), 5,000 Units, q8h ZOË  hydrALAZINE, 100 mg, q8h  isosorbide mononitrate ER, 60 mg, Daily  metoclopramide, 5 mg, BID  multivitamin with minerals, 1 tablet, Daily  NIFEdipine ER, 60 mg, BID  [START ON 5/12/2024] pantoprazole, 40 mg, Daily before breakfast  permethrin, , Daily  piperacillin-tazobactam, 2.25 g, q8h  pravastatin, 10 mg, Nightly  predniSONE, 5 mg, q AM  sodium zirconium cyclosilicate, 5 g, Daily  tacrolimus, 1.5 mg, q AM   And  tacrolimus, 2 mg, Nightly  tacrolimus, , BID      dextrose 10 % in water (D10W), Last Rate: 30 mL/hr (05/11/24 1025)      acetaminophen, 975 mg, q6h PRN  HYDROmorphone, 0.4 mg, q3h PRN  ondansetron, 4 mg, q8h PRN  traMADol, 50 mg, q12h PRN  vancomycin, , Daily PRN        ALLERGY:  No Known Allergies    PHYCISCAL EXAMINATION:  Visit Vitals  BP (!) 210/97   Pulse 71   Temp 36.3 °C (97.4 °F) (Oral)   Resp 19   Ht 1.727 m (5' 8\")   Wt 77.1 kg (170 lb)   SpO2 96%   BMI 25.85 kg/m²   Smoking Status Never   BSA 1.92 m²        No intake/output data recorded.       General Appearance - Respiratory distress, in pain, NC 3 LMP  HEENT - Supple. Not pale. No " jaundice. No cervical lymphadenopathy. Pharynx and tonsils are not injected.  CVS - RRR. Normal S1/S2. No murmur, click , rub or gallop  Lungs- Bibasilar crackles.  Abdomen -  S/P Kidney transplant .  Transplanted kidney is not tender.   Musculoskeletal /Extremities - no edema. Full ROM. No joint tenderness.   Neuro/Psych - appropriate mood and affect. Motor power V/V all extremities. CN I -XII were grossly intact.  Skin - No visible rash    LABS:  Results for orders placed or performed during the hospital encounter of 05/11/24 (from the past 24 hour(s))   Blood Gas Venous Full Panel Unsolicited   Result Value Ref Range    POCT pH, Venous 7.45 (H) 7.33 - 7.43 pH    POCT pCO2, Venous 43 41 - 51 mm Hg    POCT pO2, Venous 54 (H) 35 - 45 mm Hg    POCT SO2, Venous 86 (H) 45 - 75 %    POCT Oxy Hemoglobin, Venous 83.2 (H) 45.0 - 75.0 %    POCT Hematocrit Calculated, Venous 27.0 (L) 41.0 - 52.0 %    POCT Sodium, Venous 140 136 - 145 mmol/L    POCT Potassium, Venous 4.3 3.5 - 5.3 mmol/L    POCT Chloride, Venous 111 (H) 98 - 107 mmol/L    POCT Ionized Calicum, Venous 1.36 (H) 1.10 - 1.33 mmol/L    POCT Glucose, Venous 60 (L) 74 - 99 mg/dL    POCT Lactate, Venous 0.9 0.4 - 2.0 mmol/L    POCT Base Excess, Venous 5.4 (H) -2.0 - 3.0 mmol/L    POCT HCO3 Calculated, Venous 29.9 (H) 22.0 - 26.0 mmol/L    POCT Hemoglobin, Venous 8.9 (L) 13.5 - 17.5 g/dL    POCT Anion Gap, Venous 3.0 (L) 10.0 - 25.0 mmol/L    Patient Temperature 37.0 degrees Celsius   CBC and Auto Differential   Result Value Ref Range    WBC 6.1 4.4 - 11.3 x10*3/uL    nRBC 0.0 0.0 - 0.0 /100 WBCs    RBC 2.92 (L) 4.50 - 5.90 x10*6/uL    Hemoglobin 8.6 (L) 13.5 - 17.5 g/dL    Hematocrit 25.6 (L) 41.0 - 52.0 %    MCV 88 80 - 100 fL    MCH 29.5 26.0 - 34.0 pg    MCHC 33.6 32.0 - 36.0 g/dL    RDW 14.9 (H) 11.5 - 14.5 %    Platelets 132 (L) 150 - 450 x10*3/uL    Neutrophils % 74.1 40.0 - 80.0 %    Immature Granulocytes %, Automated 1.8 (H) 0.0 - 0.9 %    Lymphocytes % 10.8  13.0 - 44.0 %    Monocytes % 10.7 2.0 - 10.0 %    Eosinophils % 2.1 0.0 - 6.0 %    Basophils % 0.5 0.0 - 2.0 %    Neutrophils Absolute 4.51 1.20 - 7.70 x10*3/uL    Immature Granulocytes Absolute, Automated 0.11 0.00 - 0.70 x10*3/uL    Lymphocytes Absolute 0.66 (L) 1.20 - 4.80 x10*3/uL    Monocytes Absolute 0.65 0.10 - 1.00 x10*3/uL    Eosinophils Absolute 0.13 0.00 - 0.70 x10*3/uL    Basophils Absolute 0.03 0.00 - 0.10 x10*3/uL   Comprehensive metabolic panel   Result Value Ref Range    Glucose 49 (LL) 74 - 99 mg/dL    Sodium 145 136 - 145 mmol/L    Potassium 4.3 3.5 - 5.3 mmol/L    Chloride 106 98 - 107 mmol/L    Bicarbonate 26 21 - 32 mmol/L    Anion Gap 17 10 - 20 mmol/L    Urea Nitrogen 62 (H) 6 - 23 mg/dL    Creatinine 4.58 (H) 0.50 - 1.30 mg/dL    eGFR 13 (L) >60 mL/min/1.73m*2    Calcium 9.4 8.6 - 10.6 mg/dL    Albumin 3.4 3.4 - 5.0 g/dL    Alkaline Phosphatase 47 33 - 136 U/L    Total Protein 6.2 (L) 6.4 - 8.2 g/dL    AST 48 (H) 9 - 39 U/L    Bilirubin, Total 0.4 0.0 - 1.2 mg/dL    ALT 48 10 - 52 U/L   Magnesium   Result Value Ref Range    Magnesium 2.09 1.60 - 2.40 mg/dL   Phosphorus   Result Value Ref Range    Phosphorus 2.3 (L) 2.5 - 4.9 mg/dL   B-Type Natriuretic Peptide   Result Value Ref Range     (H) 0 - 99 pg/mL   Troponin I, High Sensitivity, Initial   Result Value Ref Range    Troponin I, High Sensitivity 41 0 - 53 ng/L   POCT GLUCOSE   Result Value Ref Range    POCT Glucose 40 (L) 74 - 99 mg/dL   Troponin, High Sensitivity, 1 Hour   Result Value Ref Range    Troponin I, High Sensitivity 47 0 - 53 ng/L   POCT GLUCOSE   Result Value Ref Range    POCT Glucose 69 (L) 74 - 99 mg/dL   MRSA Surveillance for Vancomycin De-escalation, PCR    Specimen: Anterior Nares; Swab   Result Value Ref Range    MRSA PCR Not Detected Not Detected   Urinalysis with Reflex Culture and Microscopic   Result Value Ref Range    Color, Urine Light-Yellow Light-Yellow, Yellow, Dark-Yellow    Appearance, Urine Clear  Clear    Specific Gravity, Urine 1.012 1.005 - 1.035    pH, Urine 6.0 5.0, 5.5, 6.0, 6.5, 7.0, 7.5, 8.0    Protein, Urine 100 (2+) (A) NEGATIVE, 10 (TRACE), 20 (TRACE) mg/dL    Glucose, Urine Normal Normal mg/dL    Blood, Urine NEGATIVE NEGATIVE    Ketones, Urine NEGATIVE NEGATIVE mg/dL    Bilirubin, Urine NEGATIVE NEGATIVE    Urobilinogen, Urine Normal Normal mg/dL    Nitrite, Urine NEGATIVE NEGATIVE    Leukocyte Esterase, Urine NEGATIVE NEGATIVE   Urinalysis Microscopic   Result Value Ref Range    WBC, Urine 1-5 1-5, NONE /HPF    RBC, Urine 1-2 NONE, 1-2, 3-5 /HPF    Squamous Epithelial Cells, Urine 1-9 (SPARSE) Reference range not established. /HPF    Bacteria, Urine 4+ (A) NONE SEEN /HPF    Mucus, Urine FEW Reference range not established. /LPF    Hyaline Casts, Urine 3+ (A) NONE /LPF   POCT GLUCOSE   Result Value Ref Range    POCT Glucose 54 (L) 74 - 99 mg/dL   POCT GLUCOSE   Result Value Ref Range    POCT Glucose 51 (L) 74 - 99 mg/dL   POCT GLUCOSE   Result Value Ref Range    POCT Glucose 123 (H) 74 - 99 mg/dL   POCT GLUCOSE   Result Value Ref Range    POCT Glucose 97 74 - 99 mg/dL        ASSESSMENT AND PLAN:  Mr. Bashir is a 68 y.o. male who underwent a kidney transplant surgery on [unfilled] and was hospitalized for:    Principal Problem:    Shortness of breath      1. ESRD S/P Kidney transplant. Failing kidney transplant    - Renal allograft function:   Lab Results   Component Value Date    CREATININE 4.58 (H) 05/11/2024     Estimated Creatinine Clearance: 15.1 mL/min (A) (by C-G formula based on SCr of 4.58 mg/dL (H)).    Intake/Output Summary (Last 24 hours) at 5/11/2024 1330  Last data filed at 5/11/2024 1250  Gross per 24 hour   Intake --   Output 400 ml   Net -400 ml       - No indication for urgent HD today  - LUE AVG placed 3/21/24  - Continue to monitor UOP and Serum creatinine closely.   - Avoid nephrotoxic agents, NSAIDs and IV contrast   - Strict I/O.   - Renally dose all medications by the most  recent CrCl from Cockcroft-Gault formula.    2. Immunosuppression   - continue current immunosuppression   -HOLD Azathioprine due to severe pneumonia  - home dose tac at 6:30 am/6:30 pm  -send daily tacrolimus trough level  -continue home dose prednisone 5 mg daily    3. Anemia and WBC   Lab Results   Component Value Date    WBC 6.1 05/11/2024    HGB 8.6 (L) 05/11/2024    HCT 25.6 (L) 05/11/2024    MCV 88 05/11/2024     (L) 05/11/2024     -Continue to monitor Hgb   -No indications for PRBC transfusion   -send iron studies, ferritin  - would consider Aranesp    4. Electrolyte   Lab Results   Component Value Date    GLUCOSE 49 (LL) 05/11/2024    CALCIUM 9.4 05/11/2024     05/11/2024    K 4.3 05/11/2024    CO2 26 05/11/2024     05/11/2024    BUN 62 (H) 05/11/2024    CREATININE 4.58 (H) 05/11/2024     - Reviewed renal profile.     6. Hypertensive urgency    Blood Pressures         5/11/2024  0230 5/11/2024  0500 5/11/2024  0530 5/11/2024  0700 5/11/2024  0910    BP: 130/67 172/89 163/86 180/96 210/97          - Aggravated by respiratory distress , pain and N/V  - on telemetry in ER  - Getting BP meds now.    7.  Pneumonia/CHF  -Diuresis; goal net neg 1-2 L in 24 hours  Monitor K/Mg during diuresis  -antibiotic  -follow up culture    8.  GI prophylaxis   - On PPI     9. DVT Prophylaxis  -Defer to primary team    10. N/V Abdominal pain  UA - Negative for UTI  Agreed with CT C/A/P with contrast to rule out aortic dissection per primary team  Zofran     * Case was discussed with primary team residents and attending    For questions, please contact transplant nephrology page x [phone].      Marion Bridges    Transplant Nephrologist

## 2024-05-12 LAB
ALBUMIN SERPL BCP-MCNC: 3.2 G/DL (ref 3.4–5)
ALP SERPL-CCNC: 42 U/L (ref 33–136)
ALT SERPL W P-5'-P-CCNC: 33 U/L (ref 10–52)
ANION GAP SERPL CALC-SCNC: 11 MMOL/L (ref 10–20)
AST SERPL W P-5'-P-CCNC: 23 U/L (ref 9–39)
ATRIAL RATE: 87 BPM
BASOPHILS # BLD AUTO: 0.03 X10*3/UL (ref 0–0.1)
BASOPHILS NFR BLD AUTO: 0.6 %
BILIRUB SERPL-MCNC: 0.4 MG/DL (ref 0–1.2)
BUN SERPL-MCNC: 58 MG/DL (ref 6–23)
CALCIUM SERPL-MCNC: 8.7 MG/DL (ref 8.6–10.6)
CHLORIDE SERPL-SCNC: 103 MMOL/L (ref 98–107)
CO2 SERPL-SCNC: 28 MMOL/L (ref 21–32)
CREAT SERPL-MCNC: 4.59 MG/DL (ref 0.5–1.3)
EGFRCR SERPLBLD CKD-EPI 2021: 13 ML/MIN/1.73M*2
EOSINOPHIL # BLD AUTO: 0.08 X10*3/UL (ref 0–0.7)
EOSINOPHIL NFR BLD AUTO: 1.6 %
ERYTHROCYTE [DISTWIDTH] IN BLOOD BY AUTOMATED COUNT: 15 % (ref 11.5–14.5)
GLUCOSE BLD MANUAL STRIP-MCNC: 140 MG/DL (ref 74–99)
GLUCOSE BLD MANUAL STRIP-MCNC: 155 MG/DL (ref 74–99)
GLUCOSE BLD MANUAL STRIP-MCNC: 162 MG/DL (ref 74–99)
GLUCOSE BLD MANUAL STRIP-MCNC: 377 MG/DL (ref 74–99)
GLUCOSE BLD MANUAL STRIP-MCNC: 382 MG/DL (ref 74–99)
GLUCOSE BLD MANUAL STRIP-MCNC: 403 MG/DL (ref 74–99)
GLUCOSE SERPL-MCNC: 157 MG/DL (ref 74–99)
HCT VFR BLD AUTO: 26 % (ref 41–52)
HGB BLD-MCNC: 8 G/DL (ref 13.5–17.5)
IMM GRANULOCYTES # BLD AUTO: 0.11 X10*3/UL (ref 0–0.7)
IMM GRANULOCYTES NFR BLD AUTO: 2.1 % (ref 0–0.9)
LYMPHOCYTES # BLD AUTO: 0.64 X10*3/UL (ref 1.2–4.8)
LYMPHOCYTES NFR BLD AUTO: 12.4 %
MCH RBC QN AUTO: 29 PG (ref 26–34)
MCHC RBC AUTO-ENTMCNC: 30.8 G/DL (ref 32–36)
MCV RBC AUTO: 94 FL (ref 80–100)
MONOCYTES # BLD AUTO: 0.58 X10*3/UL (ref 0.1–1)
MONOCYTES NFR BLD AUTO: 11.3 %
NEUTROPHILS # BLD AUTO: 3.71 X10*3/UL (ref 1.2–7.7)
NEUTROPHILS NFR BLD AUTO: 72 %
NRBC BLD-RTO: 0 /100 WBCS (ref 0–0)
OVALOCYTES BLD QL SMEAR: NORMAL
P AXIS: 61 DEGREES
P OFFSET: 185 MS
P ONSET: 127 MS
PLATELET # BLD AUTO: 75 X10*3/UL (ref 150–450)
POTASSIUM SERPL-SCNC: 4.7 MMOL/L (ref 3.5–5.3)
PR INTERVAL: 174 MS
PROT SERPL-MCNC: 5.7 G/DL (ref 6.4–8.2)
Q ONSET: 214 MS
QRS COUNT: 14 BEATS
QRS DURATION: 110 MS
QT INTERVAL: 410 MS
QTC CALCULATION(BAZETT): 493 MS
QTC FREDERICIA: 463 MS
R AXIS: 33 DEGREES
RBC # BLD AUTO: 2.76 X10*6/UL (ref 4.5–5.9)
RBC MORPH BLD: NORMAL
SODIUM SERPL-SCNC: 137 MMOL/L (ref 136–145)
T AXIS: 75 DEGREES
T OFFSET: 419 MS
TACROLIMUS BLD-MCNC: 6.4 NG/ML
VANCOMYCIN SERPL-MCNC: 15.5 UG/ML (ref 5–20)
VENTRICULAR RATE: 87 BPM
WBC # BLD AUTO: 5.2 X10*3/UL (ref 4.4–11.3)

## 2024-05-12 PROCEDURE — 2500000004 HC RX 250 GENERAL PHARMACY W/ HCPCS (ALT 636 FOR OP/ED): Performed by: INTERNAL MEDICINE

## 2024-05-12 PROCEDURE — 82947 ASSAY GLUCOSE BLOOD QUANT: CPT

## 2024-05-12 PROCEDURE — 85025 COMPLETE CBC W/AUTO DIFF WBC: CPT | Performed by: STUDENT IN AN ORGANIZED HEALTH CARE EDUCATION/TRAINING PROGRAM

## 2024-05-12 PROCEDURE — 2500000004 HC RX 250 GENERAL PHARMACY W/ HCPCS (ALT 636 FOR OP/ED)

## 2024-05-12 PROCEDURE — 2500000001 HC RX 250 WO HCPCS SELF ADMINISTERED DRUGS (ALT 637 FOR MEDICARE OP): Performed by: STUDENT IN AN ORGANIZED HEALTH CARE EDUCATION/TRAINING PROGRAM

## 2024-05-12 PROCEDURE — 36415 COLL VENOUS BLD VENIPUNCTURE: CPT | Performed by: STUDENT IN AN ORGANIZED HEALTH CARE EDUCATION/TRAINING PROGRAM

## 2024-05-12 PROCEDURE — 99233 SBSQ HOSP IP/OBS HIGH 50: CPT | Performed by: INTERNAL MEDICINE

## 2024-05-12 PROCEDURE — 80053 COMPREHEN METABOLIC PANEL: CPT | Performed by: STUDENT IN AN ORGANIZED HEALTH CARE EDUCATION/TRAINING PROGRAM

## 2024-05-12 PROCEDURE — 80202 ASSAY OF VANCOMYCIN: CPT | Performed by: STUDENT IN AN ORGANIZED HEALTH CARE EDUCATION/TRAINING PROGRAM

## 2024-05-12 PROCEDURE — 87899 AGENT NOS ASSAY W/OPTIC: CPT | Performed by: STUDENT IN AN ORGANIZED HEALTH CARE EDUCATION/TRAINING PROGRAM

## 2024-05-12 PROCEDURE — 2500000001 HC RX 250 WO HCPCS SELF ADMINISTERED DRUGS (ALT 637 FOR MEDICARE OP)

## 2024-05-12 PROCEDURE — 87632 RESP VIRUS 6-11 TARGETS: CPT

## 2024-05-12 PROCEDURE — 2500000004 HC RX 250 GENERAL PHARMACY W/ HCPCS (ALT 636 FOR OP/ED): Performed by: STUDENT IN AN ORGANIZED HEALTH CARE EDUCATION/TRAINING PROGRAM

## 2024-05-12 PROCEDURE — 1210000001 HC SEMI-PRIVATE ROOM DAILY

## 2024-05-12 PROCEDURE — 80197 ASSAY OF TACROLIMUS: CPT | Performed by: STUDENT IN AN ORGANIZED HEALTH CARE EDUCATION/TRAINING PROGRAM

## 2024-05-12 PROCEDURE — 2500000002 HC RX 250 W HCPCS SELF ADMINISTERED DRUGS (ALT 637 FOR MEDICARE OP, ALT 636 FOR OP/ED)

## 2024-05-12 PROCEDURE — 2500000002 HC RX 250 W HCPCS SELF ADMINISTERED DRUGS (ALT 637 FOR MEDICARE OP, ALT 636 FOR OP/ED): Performed by: STUDENT IN AN ORGANIZED HEALTH CARE EDUCATION/TRAINING PROGRAM

## 2024-05-12 PROCEDURE — 2500000005 HC RX 250 GENERAL PHARMACY W/O HCPCS: Performed by: INTERNAL MEDICINE

## 2024-05-12 PROCEDURE — 87449 NOS EACH ORGANISM AG IA: CPT | Performed by: STUDENT IN AN ORGANIZED HEALTH CARE EDUCATION/TRAINING PROGRAM

## 2024-05-12 PROCEDURE — 2500000006 HC RX 250 W HCPCS SELF ADMINISTERED DRUGS (ALT 637 FOR ALL PAYERS): Performed by: STUDENT IN AN ORGANIZED HEALTH CARE EDUCATION/TRAINING PROGRAM

## 2024-05-12 RX ORDER — INSULIN LISPRO 100 [IU]/ML
0-5 INJECTION, SOLUTION INTRAVENOUS; SUBCUTANEOUS
Status: DISCONTINUED | OUTPATIENT
Start: 2024-05-12 | End: 2024-05-19 | Stop reason: HOSPADM

## 2024-05-12 RX ORDER — OXYCODONE HYDROCHLORIDE 5 MG/1
2.5 TABLET ORAL ONCE
Status: COMPLETED | OUTPATIENT
Start: 2024-05-12 | End: 2024-05-12

## 2024-05-12 RX ORDER — VANCOMYCIN HYDROCHLORIDE 500 MG/100ML
500 INJECTION, SOLUTION INTRAVENOUS ONCE
Status: COMPLETED | OUTPATIENT
Start: 2024-05-12 | End: 2024-05-12

## 2024-05-12 RX ORDER — LIDOCAINE 560 MG/1
1 PATCH PERCUTANEOUS; TOPICAL; TRANSDERMAL DAILY
Status: DISCONTINUED | OUTPATIENT
Start: 2024-05-13 | End: 2024-05-19 | Stop reason: HOSPADM

## 2024-05-12 RX ORDER — BUMETANIDE 0.25 MG/ML
3 INJECTION INTRAMUSCULAR; INTRAVENOUS ONCE
Status: COMPLETED | OUTPATIENT
Start: 2024-05-12 | End: 2024-05-12

## 2024-05-12 RX ORDER — TACROLIMUS 1 MG/1
CAPSULE ORAL
COMMUNITY
Start: 2024-04-23 | End: 2024-05-19 | Stop reason: HOSPADM

## 2024-05-12 RX ADMIN — ASPIRIN 81 MG 81 MG: 81 TABLET ORAL at 09:29

## 2024-05-12 RX ADMIN — CALCITRIOL CAPSULES 0.25 MCG 0.25 MCG: 0.25 CAPSULE ORAL at 09:29

## 2024-05-12 RX ADMIN — PREDNISONE 5 MG: 5 TABLET ORAL at 09:29

## 2024-05-12 RX ADMIN — Medication 1 TABLET: at 09:29

## 2024-05-12 RX ADMIN — PIPERACILLIN SODIUM AND TAZOBACTAM SODIUM 2.25 G: 2; .25 INJECTION, SOLUTION INTRAVENOUS at 14:27

## 2024-05-12 RX ADMIN — PANTOPRAZOLE SODIUM 40 MG: 40 TABLET, DELAYED RELEASE ORAL at 09:29

## 2024-05-12 RX ADMIN — HEPARIN SODIUM 5000 UNITS: 5000 INJECTION INTRAVENOUS; SUBCUTANEOUS at 20:17

## 2024-05-12 RX ADMIN — HEPARIN SODIUM 5000 UNITS: 5000 INJECTION INTRAVENOUS; SUBCUTANEOUS at 14:28

## 2024-05-12 RX ADMIN — HYDRALAZINE HYDROCHLORIDE 100 MG: 25 TABLET ORAL at 04:50

## 2024-05-12 RX ADMIN — VANCOMYCIN HYDROCHLORIDE 500 MG: 500 INJECTION, SOLUTION INTRAVENOUS at 09:28

## 2024-05-12 RX ADMIN — CINACALCET 30 MG: 30 TABLET, FILM COATED ORAL at 09:29

## 2024-05-12 RX ADMIN — HYDRALAZINE HYDROCHLORIDE 100 MG: 25 TABLET ORAL at 20:16

## 2024-05-12 RX ADMIN — BUMETANIDE 3 MG: 0.25 INJECTION INTRAMUSCULAR; INTRAVENOUS at 14:27

## 2024-05-12 RX ADMIN — CARVEDILOL 37.5 MG: 12.5 TABLET, FILM COATED ORAL at 20:16

## 2024-05-12 RX ADMIN — TACROLIMUS: 1 OINTMENT TOPICAL at 20:17

## 2024-05-12 RX ADMIN — INSULIN LISPRO 5 UNITS: 100 INJECTION, SOLUTION INTRAVENOUS; SUBCUTANEOUS at 20:18

## 2024-05-12 RX ADMIN — ISOSORBIDE MONONITRATE 60 MG: 60 TABLET, EXTENDED RELEASE ORAL at 09:29

## 2024-05-12 RX ADMIN — CARVEDILOL 37.5 MG: 12.5 TABLET, FILM COATED ORAL at 09:35

## 2024-05-12 RX ADMIN — NIFEDIPINE 60 MG: 60 TABLET, FILM COATED, EXTENDED RELEASE ORAL at 09:35

## 2024-05-12 RX ADMIN — PRAVASTATIN SODIUM 10 MG: 20 TABLET ORAL at 20:17

## 2024-05-12 RX ADMIN — TACROLIMUS 2 MG: 1 CAPSULE ORAL at 18:22

## 2024-05-12 RX ADMIN — PERMETHRIN: 50 CREAM TOPICAL at 21:00

## 2024-05-12 RX ADMIN — BUMETANIDE 3 MG: 0.25 INJECTION INTRAMUSCULAR; INTRAVENOUS at 21:47

## 2024-05-12 RX ADMIN — METOCLOPRAMIDE 5 MG: 10 TABLET ORAL at 09:29

## 2024-05-12 RX ADMIN — SODIUM ZIRCONIUM CYCLOSILICATE 5 G: 10 POWDER, FOR SUSPENSION ORAL at 09:29

## 2024-05-12 RX ADMIN — HEPARIN SODIUM 5000 UNITS: 5000 INJECTION INTRAVENOUS; SUBCUTANEOUS at 04:50

## 2024-05-12 RX ADMIN — Medication 4 L/MIN: at 10:46

## 2024-05-12 RX ADMIN — METOCLOPRAMIDE 5 MG: 10 TABLET ORAL at 21:00

## 2024-05-12 RX ADMIN — TRAMADOL HYDROCHLORIDE 50 MG: 50 TABLET, COATED ORAL at 20:17

## 2024-05-12 RX ADMIN — HYDRALAZINE HYDROCHLORIDE 100 MG: 25 TABLET ORAL at 14:28

## 2024-05-12 RX ADMIN — PIPERACILLIN SODIUM AND TAZOBACTAM SODIUM 2.25 G: 2; .25 INJECTION, SOLUTION INTRAVENOUS at 04:49

## 2024-05-12 RX ADMIN — NIFEDIPINE 60 MG: 60 TABLET, FILM COATED, EXTENDED RELEASE ORAL at 21:00

## 2024-05-12 RX ADMIN — TACROLIMUS 1.5 MG: 0.5 CAPSULE ORAL at 05:06

## 2024-05-12 RX ADMIN — OXYCODONE HYDROCHLORIDE 2.5 MG: 5 TABLET ORAL at 20:49

## 2024-05-12 SDOH — SOCIAL STABILITY: SOCIAL INSECURITY: HAS ANYONE EVER THREATENED TO HURT YOUR FAMILY OR YOUR PETS?: NO

## 2024-05-12 SDOH — SOCIAL STABILITY: SOCIAL INSECURITY: DO YOU FEEL UNSAFE GOING BACK TO THE PLACE WHERE YOU ARE LIVING?: NO

## 2024-05-12 SDOH — SOCIAL STABILITY: SOCIAL INSECURITY: WERE YOU ABLE TO COMPLETE ALL THE BEHAVIORAL HEALTH SCREENINGS?: YES

## 2024-05-12 SDOH — SOCIAL STABILITY: SOCIAL INSECURITY: HAVE YOU HAD THOUGHTS OF HARMING ANYONE ELSE?: NO

## 2024-05-12 SDOH — SOCIAL STABILITY: SOCIAL INSECURITY: DO YOU FEEL ANYONE HAS EXPLOITED OR TAKEN ADVANTAGE OF YOU FINANCIALLY OR OF YOUR PERSONAL PROPERTY?: NO

## 2024-05-12 SDOH — SOCIAL STABILITY: SOCIAL INSECURITY: HAVE YOU HAD ANY THOUGHTS OF HARMING ANYONE ELSE?: NO

## 2024-05-12 SDOH — SOCIAL STABILITY: SOCIAL INSECURITY: DOES ANYONE TRY TO KEEP YOU FROM HAVING/CONTACTING OTHER FRIENDS OR DOING THINGS OUTSIDE YOUR HOME?: NO

## 2024-05-12 SDOH — SOCIAL STABILITY: SOCIAL INSECURITY: ARE THERE ANY APPARENT SIGNS OF INJURIES/BEHAVIORS THAT COULD BE RELATED TO ABUSE/NEGLECT?: NO

## 2024-05-12 SDOH — SOCIAL STABILITY: SOCIAL INSECURITY: ABUSE: ADULT

## 2024-05-12 SDOH — SOCIAL STABILITY: SOCIAL INSECURITY: ARE YOU OR HAVE YOU BEEN THREATENED OR ABUSED PHYSICALLY, EMOTIONALLY, OR SEXUALLY BY ANYONE?: NO

## 2024-05-12 ASSESSMENT — PAIN SCALES - GENERAL
PAINLEVEL_OUTOF10: 0 - NO PAIN

## 2024-05-12 ASSESSMENT — COGNITIVE AND FUNCTIONAL STATUS - GENERAL
PATIENT BASELINE BEDBOUND: NO
CLIMB 3 TO 5 STEPS WITH RAILING: A LOT
DRESSING REGULAR UPPER BODY CLOTHING: A LITTLE
EATING MEALS: A LITTLE
HELP NEEDED FOR BATHING: A LITTLE
MOVING TO AND FROM BED TO CHAIR: A LITTLE
MOBILITY SCORE: 17
DAILY ACTIVITIY SCORE: 19
HELP NEEDED FOR BATHING: A LITTLE
DRESSING REGULAR LOWER BODY CLOTHING: A LITTLE
DRESSING REGULAR UPPER BODY CLOTHING: A LITTLE
MOVING FROM LYING ON BACK TO SITTING ON SIDE OF FLAT BED WITH BEDRAILS: A LITTLE
PERSONAL GROOMING: A LITTLE
WALKING IN HOSPITAL ROOM: A LITTLE
MOBILITY SCORE: 21
TURNING FROM BACK TO SIDE WHILE IN FLAT BAD: A LITTLE
WALKING IN HOSPITAL ROOM: A LITTLE
TOILETING: A LITTLE
STANDING UP FROM CHAIR USING ARMS: A LITTLE
DAILY ACTIVITIY SCORE: 21
CLIMB 3 TO 5 STEPS WITH RAILING: A LOT

## 2024-05-12 ASSESSMENT — LIFESTYLE VARIABLES
HOW OFTEN DO YOU HAVE A DRINK CONTAINING ALCOHOL: NEVER
AUDIT-C TOTAL SCORE: 0
AUDIT-C TOTAL SCORE: 0
HOW MANY STANDARD DRINKS CONTAINING ALCOHOL DO YOU HAVE ON A TYPICAL DAY: PATIENT DOES NOT DRINK
HOW OFTEN DO YOU HAVE 6 OR MORE DRINKS ON ONE OCCASION: NEVER
SKIP TO QUESTIONS 9-10: 1

## 2024-05-12 ASSESSMENT — ACTIVITIES OF DAILY LIVING (ADL)
TOILETING: INDEPENDENT
HEARING - RIGHT EAR: FUNCTIONAL
ADEQUATE_TO_COMPLETE_ADL: YES
PATIENT'S MEMORY ADEQUATE TO SAFELY COMPLETE DAILY ACTIVITIES?: YES
LACK_OF_TRANSPORTATION: NO
JUDGMENT_ADEQUATE_SAFELY_COMPLETE_DAILY_ACTIVITIES: YES
WALKS IN HOME: INDEPENDENT
HEARING - LEFT EAR: FUNCTIONAL
DRESSING YOURSELF: NEEDS ASSISTANCE
BATHING: NEEDS ASSISTANCE
FEEDING YOURSELF: INDEPENDENT
GROOMING: NEEDS ASSISTANCE

## 2024-05-12 ASSESSMENT — PAIN - FUNCTIONAL ASSESSMENT
PAIN_FUNCTIONAL_ASSESSMENT: 0-10
PAIN_FUNCTIONAL_ASSESSMENT: 0-10

## 2024-05-12 ASSESSMENT — PAIN SCALES - WONG BAKER
WONGBAKER_NUMERICALRESPONSE: NO HURT

## 2024-05-12 NOTE — CARE PLAN
The patient's goals for the shift include      The clinical goals for the shift include Pt will report feeling less fatigue

## 2024-05-12 NOTE — PROGRESS NOTES
"        INPATIENT TRANSPLANT NEPHROLOGY PROGRESS NOTE          REASON FOR CONSULT:  Immunosuppressive medication management and nephrology related issues.    SUBJECTION:     Patient was sleeping. He woke up when called, but couldn't answer any questions and went back to sleep again.  BP improved  No acute event overnight.    PHYCISCAL EXAMINATION:    Visit Vitals  /65   Pulse 81   Temp 36.7 °C (98.1 °F)   Resp 17   Ht 1.727 m (5' 8\")   Wt 77.1 kg (170 lb)   SpO2 95%   BMI 25.85 kg/m²   Smoking Status Never   BSA 1.92 m²        05/10 1900 - 05/12 0659  In: 300 [P.O.:300]  Out: 400 [Urine:400]     Weight change:     General Appearance - NAD, Good speech, oriented and alert  HEENT - Supple. Not pale. No jaundice. No cervical lymphadenopathy. Pharynx and tonsils are not injected.  CVS - RRR. Normal S1/S2. No murmur, click , rub or gallop  Lungs- Crackles bibasilar; on NC  Abdomen - soft , not tender, no guarding, no rigidity. No hepatosplenomegaly. Normal bowel sounds. No masses and ascites. S/P Kidney transplant .  Transplanted kidney is not tender.   Musculoskeletal /Extremities - no edema. Full ROM. No joint tenderness.   Neuro/Psych - appropriate mood and affect. Motor power V/V all extremities. CN I -XII were grossly intact.  Skin - No visible rash    MEDICATION LIST: REVIEWED    aspirin, 81 mg, Daily  [Held by provider] azaTHIOprine, 50 mg, Daily  calcitriol, 0.25 mcg, Daily  carvedilol, 37.5 mg, BID  cinacalcet, 30 mg, Daily  heparin (porcine), 5,000 Units, q8h ZOË  hydrALAZINE, 100 mg, q8h  isosorbide mononitrate ER, 60 mg, Daily  metoclopramide, 5 mg, BID  multivitamin with minerals, 1 tablet, Daily  NIFEdipine ER, 60 mg, BID  pantoprazole, 40 mg, Daily before breakfast  permethrin, , Daily  piperacillin-tazobactam, 2.25 g, q8h  pravastatin, 10 mg, Nightly  predniSONE, 5 mg, q AM  sodium zirconium cyclosilicate, 5 g, Daily  tacrolimus, 1.5 mg, q AM   And  tacrolimus, 2 mg, Nightly  tacrolimus, , BID       "   acetaminophen, 975 mg, q6h PRN  dextrose, 12.5 g, q15 min PRN  dextrose, 25 g, q15 min PRN  glucagon, 1 mg, q15 min PRN  glucagon, 1 mg, q15 min PRN  ondansetron, 4 mg, q8h PRN  oxygen, , Continuous PRN - O2/gases  traMADol, 50 mg, q12h PRN  vancomycin, , Daily PRN        ALLERGY:  No Known Allergies    LABS:  Results for orders placed or performed during the hospital encounter of 05/11/24 (from the past 24 hour(s))   Troponin I, High Sensitivity   Result Value Ref Range    Troponin I, High Sensitivity 53 0 - 53 ng/L   Ferritin   Result Value Ref Range    Ferritin 602 (H) 20 - 300 ng/mL   POCT GLUCOSE   Result Value Ref Range    POCT Glucose 64 (L) 74 - 99 mg/dL   POCT GLUCOSE   Result Value Ref Range    POCT Glucose 60 (L) 74 - 99 mg/dL   Renal function panel   Result Value Ref Range    Glucose 57 (L) 74 - 99 mg/dL    Sodium 141 136 - 145 mmol/L    Potassium 4.3 3.5 - 5.3 mmol/L    Chloride 105 98 - 107 mmol/L    Bicarbonate 29 21 - 32 mmol/L    Anion Gap 11 10 - 20 mmol/L    Urea Nitrogen 57 (H) 6 - 23 mg/dL    Creatinine 4.58 (H) 0.50 - 1.30 mg/dL    eGFR 13 (L) >60 mL/min/1.73m*2    Calcium 9.1 8.6 - 10.6 mg/dL    Phosphorus 3.9 2.5 - 4.9 mg/dL    Albumin 3.3 (L) 3.4 - 5.0 g/dL   Magnesium   Result Value Ref Range    Magnesium 1.85 1.60 - 2.40 mg/dL   POCT GLUCOSE   Result Value Ref Range    POCT Glucose 70 (L) 74 - 99 mg/dL   POCT GLUCOSE   Result Value Ref Range    POCT Glucose 89 74 - 99 mg/dL   POCT GLUCOSE   Result Value Ref Range    POCT Glucose 162 (H) 74 - 99 mg/dL   POCT GLUCOSE   Result Value Ref Range    POCT Glucose 155 (H) 74 - 99 mg/dL   Vancomycin   Result Value Ref Range    Vancomycin 15.5 5.0 - 20.0 ug/mL   Tacrolimus level   Result Value Ref Range    Tacrolimus  6.4 <=15.0 ng/mL   Comprehensive metabolic panel   Result Value Ref Range    Glucose 157 (H) 74 - 99 mg/dL    Sodium 137 136 - 145 mmol/L    Potassium 4.7 3.5 - 5.3 mmol/L    Chloride 103 98 - 107 mmol/L    Bicarbonate 28 21 - 32  mmol/L    Anion Gap 11 10 - 20 mmol/L    Urea Nitrogen 58 (H) 6 - 23 mg/dL    Creatinine 4.59 (H) 0.50 - 1.30 mg/dL    eGFR 13 (L) >60 mL/min/1.73m*2    Calcium 8.7 8.6 - 10.6 mg/dL    Albumin 3.2 (L) 3.4 - 5.0 g/dL    Alkaline Phosphatase 42 33 - 136 U/L    Total Protein 5.7 (L) 6.4 - 8.2 g/dL    AST 23 9 - 39 U/L    Bilirubin, Total 0.4 0.0 - 1.2 mg/dL    ALT 33 10 - 52 U/L   CBC and Auto Differential   Result Value Ref Range    WBC 5.2 4.4 - 11.3 x10*3/uL    nRBC 0.0 0.0 - 0.0 /100 WBCs    RBC 2.76 (L) 4.50 - 5.90 x10*6/uL    Hemoglobin 8.0 (L) 13.5 - 17.5 g/dL    Hematocrit 26.0 (L) 41.0 - 52.0 %    MCV 94 80 - 100 fL    MCH 29.0 26.0 - 34.0 pg    MCHC 30.8 (L) 32.0 - 36.0 g/dL    RDW 15.0 (H) 11.5 - 14.5 %    Platelets 75 (L) 150 - 450 x10*3/uL    Neutrophils % 72.0 40.0 - 80.0 %    Immature Granulocytes %, Automated 2.1 (H) 0.0 - 0.9 %    Lymphocytes % 12.4 13.0 - 44.0 %    Monocytes % 11.3 2.0 - 10.0 %    Eosinophils % 1.6 0.0 - 6.0 %    Basophils % 0.6 0.0 - 2.0 %    Neutrophils Absolute 3.71 1.20 - 7.70 x10*3/uL    Immature Granulocytes Absolute, Automated 0.11 0.00 - 0.70 x10*3/uL    Lymphocytes Absolute 0.64 (L) 1.20 - 4.80 x10*3/uL    Monocytes Absolute 0.58 0.10 - 1.00 x10*3/uL    Eosinophils Absolute 0.08 0.00 - 0.70 x10*3/uL    Basophils Absolute 0.03 0.00 - 0.10 x10*3/uL   Morphology   Result Value Ref Range    RBC Morphology See Below     Ovalocytes Few    POCT GLUCOSE   Result Value Ref Range    POCT Glucose 140 (H) 74 - 99 mg/dL        ASSESSMENT AND PLAN:    Mr. Bashir is a 68 y.o. male with past medical history significant for ESRD s/p 1st DDKT in 1992 and 2nd DDKT in 2013 which is failing. He also has history include MGUS with IgG and IgA lambda, diabetes, hypertension, prostate cancer status post radical prostatectomy, urinary incontinence, HCV s/p treatment.     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  Patient received 2 doses of rituximab as of 1/7/2024.     He has had several hospital admissions for fluid overload, hypertensive emergency.     Mr. Bashir presented to ER today for weakness and SOB, chrissy while supine; denies CP. Per EMS; BG 40; started pt on d10 and BG came up to 122. Hypoglycemic to 49 at 1 AM and 51 6:40 AM; not currently eating     Transplant nephrology is consulted to assist with immunosuppressive medication management and nephrology related issues.   He appears to be in respiratory distress, hypertensive urgency and having abdominal pain with N/V. Primary team is evaluating at bedside and will get CT C/A/P to rule out aortic dissection.     Noted LUE AVG placed 3/21/2024 and can be used if there is an indication for HD.      1. ESRD S/P Kidney transplant.   - Renal allograft function:   Lab Results   Component Value Date    CREATININE 4.59 (H) 05/12/2024     Estimated Creatinine Clearance: 15.1 mL/min (A) (by C-G formula based on SCr of 4.59 mg/dL (H)).    Intake/Output Summary (Last 24 hours) at 5/12/2024 1203  Last data filed at 5/12/2024 0928  Gross per 24 hour   Intake 550 ml   Output 400 ml   Net 150 ml       - No indication for urgent HD today  - LUE AVG placed 3/21/24  - Continue to monitor UOP and Serum creatinine closely.   - Avoid nephrotoxic agents, NSAIDs and IV contrast   - Strict I/O.   - Renally dose all medications by the most recent CrCl from Cockcroft-Gault formula.    2. Immunosuppression   -HOLD Azathioprine due to severe pneumonia  - home dose tac at 6:30 am/6:30 pm  -send daily tacrolimus trough level -6.4 at goal this am; continue same dose  -continue home dose prednisone 5 mg daily    3. Anemia and WBC   Lab Results   Component Value Date    WBC 5.2 05/12/2024    HGB 8.0 (L) 05/12/2024    HCT 26.0 (L) 05/12/2024    MCV 94 05/12/2024    PLT 75 (L) 05/12/2024     -Continue to monitor Hgb    -No indications for PRBC transfusion   -send iron studies, ferritin  - would consider Aranesp    4. Electrolyte   Lab Results   Component Value Date    GLUCOSE 157 (H) 05/12/2024    CALCIUM 8.7 05/12/2024     05/12/2024    K 4.7 05/12/2024    CO2 28 05/12/2024     05/12/2024    BUN 58 (H) 05/12/2024    CREATININE 4.59 (H) 05/12/2024     - Reviewed renal profile.     6. Hypertension   Blood Pressures         5/11/2024  1602 5/11/2024  1837 5/11/2024  1950 5/12/2024  0604 5/12/2024  0936    BP: 158/77 145/76 140/70 124/62 154/65          -Goal BP < 140/90 mmHg   -continue current management   -BP has improved    7. Pneumonia/CHF  -Diuresis; goal net neg 1-2 L in 24 hours  Monitor K/Mg during diuresis  -antibiotic  -follow up culture  -Consider thoracentesis    8.  GI prophylaxis   - On PPI     9. DVT Prophylaxis  -Defer to primary team    10.N/V Abdominal pain  UA - Negative for UTI  Agreed with CT C/A/P with contrast to rule out aortic dissection per primary team--> No aortic aneurysm or dissection. Noted pneumonia and pleural effusion.   Zofran     * Case was discussed with primary team.  For questions, please contact transplant nephrology page x [phone]    Marion Bridges    Transplant Nephrologist

## 2024-05-12 NOTE — PROGRESS NOTES
"Manolo Bashir is a 68 y.o. male on day 1 of admission presenting with Shortness of breath.    Subjective   Resting comfortably on 3L nasal cannula on prerounds; O2 requirement increased to 4L on rounds. Head of the bed slightly elevated but in no acute respiratory distress. Admits HA behind his eyes, but denies SOB, cough, CP, ABD pain, N/V/D.        Objective     Physical Exam  Constitutional:       General: He is not in acute distress.     Appearance: He is ill-appearing.   HENT:      Head: Normocephalic and atraumatic.   Cardiovascular:      Rate and Rhythm: Normal rate and regular rhythm.   Pulmonary:      Effort: Pulmonary effort is normal.      Breath sounds: No rales.      Comments: Decreased breath sounds at bilateral bases  Abdominal:      General: Abdomen is flat.      Palpations: Abdomen is soft.      Tenderness: There is no abdominal tenderness.   Musculoskeletal:      Comments: Trace LE edema to ankles bilaterally   Skin:     General: Skin is warm and dry.   Neurological:      General: No focal deficit present.   Psychiatric:         Mood and Affect: Mood normal.         Behavior: Behavior normal.         Last Recorded Vitals  Blood pressure 154/65, pulse 81, temperature 36.7 °C (98.1 °F), resp. rate 17, height 1.727 m (5' 8\"), weight 77.1 kg (170 lb), SpO2 95%.  Intake/Output last 3 Shifts:  I/O last 3 completed shifts:  In: 300 (3.9 mL/kg) [P.O.:300]  Out: 400 (5.2 mL/kg) [Urine:400 (0.1 mL/kg/hr)]  Weight: 77.1 kg     Relevant Results          Scheduled medications  aspirin, 81 mg, oral, Daily  [Held by provider] azaTHIOprine, 50 mg, oral, Daily  bumetanide, 3 mg, intravenous, Once  calcitriol, 0.25 mcg, oral, Daily  carvedilol, 37.5 mg, oral, BID  cinacalcet, 30 mg, oral, Daily  heparin (porcine), 5,000 Units, subcutaneous, q8h ZOË  hydrALAZINE, 100 mg, oral, q8h  isosorbide mononitrate ER, 60 mg, oral, Daily  metoclopramide, 5 mg, oral, BID  multivitamin with minerals, 1 tablet, oral, " Daily  NIFEdipine ER, 60 mg, oral, BID  pantoprazole, 40 mg, oral, Daily before breakfast  permethrin, , Topical, Daily  piperacillin-tazobactam, 2.25 g, intravenous, q8h  pravastatin, 10 mg, oral, Nightly  predniSONE, 5 mg, oral, q AM  sodium zirconium cyclosilicate, 5 g, oral, Daily  tacrolimus, 1.5 mg, oral, q AM   And  tacrolimus, 2 mg, oral, Nightly  tacrolimus, , Topical, BID      Continuous medications     PRN medications  PRN medications: acetaminophen, dextrose, dextrose, glucagon, glucagon, ondansetron, oxygen, traMADol, vancomycin    Results for orders placed or performed during the hospital encounter of 05/11/24 (from the past 24 hour(s))   POCT GLUCOSE   Result Value Ref Range    POCT Glucose 64 (L) 74 - 99 mg/dL   POCT GLUCOSE   Result Value Ref Range    POCT Glucose 60 (L) 74 - 99 mg/dL   Renal function panel   Result Value Ref Range    Glucose 57 (L) 74 - 99 mg/dL    Sodium 141 136 - 145 mmol/L    Potassium 4.3 3.5 - 5.3 mmol/L    Chloride 105 98 - 107 mmol/L    Bicarbonate 29 21 - 32 mmol/L    Anion Gap 11 10 - 20 mmol/L    Urea Nitrogen 57 (H) 6 - 23 mg/dL    Creatinine 4.58 (H) 0.50 - 1.30 mg/dL    eGFR 13 (L) >60 mL/min/1.73m*2    Calcium 9.1 8.6 - 10.6 mg/dL    Phosphorus 3.9 2.5 - 4.9 mg/dL    Albumin 3.3 (L) 3.4 - 5.0 g/dL   Magnesium   Result Value Ref Range    Magnesium 1.85 1.60 - 2.40 mg/dL   POCT GLUCOSE   Result Value Ref Range    POCT Glucose 70 (L) 74 - 99 mg/dL   POCT GLUCOSE   Result Value Ref Range    POCT Glucose 89 74 - 99 mg/dL   POCT GLUCOSE   Result Value Ref Range    POCT Glucose 162 (H) 74 - 99 mg/dL   POCT GLUCOSE   Result Value Ref Range    POCT Glucose 155 (H) 74 - 99 mg/dL   Vancomycin   Result Value Ref Range    Vancomycin 15.5 5.0 - 20.0 ug/mL   Tacrolimus level   Result Value Ref Range    Tacrolimus  6.4 <=15.0 ng/mL   Comprehensive metabolic panel   Result Value Ref Range    Glucose 157 (H) 74 - 99 mg/dL    Sodium 137 136 - 145 mmol/L    Potassium 4.7 3.5 - 5.3 mmol/L     Chloride 103 98 - 107 mmol/L    Bicarbonate 28 21 - 32 mmol/L    Anion Gap 11 10 - 20 mmol/L    Urea Nitrogen 58 (H) 6 - 23 mg/dL    Creatinine 4.59 (H) 0.50 - 1.30 mg/dL    eGFR 13 (L) >60 mL/min/1.73m*2    Calcium 8.7 8.6 - 10.6 mg/dL    Albumin 3.2 (L) 3.4 - 5.0 g/dL    Alkaline Phosphatase 42 33 - 136 U/L    Total Protein 5.7 (L) 6.4 - 8.2 g/dL    AST 23 9 - 39 U/L    Bilirubin, Total 0.4 0.0 - 1.2 mg/dL    ALT 33 10 - 52 U/L   CBC and Auto Differential   Result Value Ref Range    WBC 5.2 4.4 - 11.3 x10*3/uL    nRBC 0.0 0.0 - 0.0 /100 WBCs    RBC 2.76 (L) 4.50 - 5.90 x10*6/uL    Hemoglobin 8.0 (L) 13.5 - 17.5 g/dL    Hematocrit 26.0 (L) 41.0 - 52.0 %    MCV 94 80 - 100 fL    MCH 29.0 26.0 - 34.0 pg    MCHC 30.8 (L) 32.0 - 36.0 g/dL    RDW 15.0 (H) 11.5 - 14.5 %    Platelets 75 (L) 150 - 450 x10*3/uL    Neutrophils % 72.0 40.0 - 80.0 %    Immature Granulocytes %, Automated 2.1 (H) 0.0 - 0.9 %    Lymphocytes % 12.4 13.0 - 44.0 %    Monocytes % 11.3 2.0 - 10.0 %    Eosinophils % 1.6 0.0 - 6.0 %    Basophils % 0.6 0.0 - 2.0 %    Neutrophils Absolute 3.71 1.20 - 7.70 x10*3/uL    Immature Granulocytes Absolute, Automated 0.11 0.00 - 0.70 x10*3/uL    Lymphocytes Absolute 0.64 (L) 1.20 - 4.80 x10*3/uL    Monocytes Absolute 0.58 0.10 - 1.00 x10*3/uL    Eosinophils Absolute 0.08 0.00 - 0.70 x10*3/uL    Basophils Absolute 0.03 0.00 - 0.10 x10*3/uL   Morphology   Result Value Ref Range    RBC Morphology See Below     Ovalocytes Few    POCT GLUCOSE   Result Value Ref Range    POCT Glucose 140 (H) 74 - 99 mg/dL     ECG 12 lead    Result Date: 5/12/2024  Normal sinus rhythm Minimal voltage criteria for LVH, may be normal variant ( Levar product ) Septal infarct , age undetermined Abnormal ECG When compared with ECG of 11-MAY-2024 00:24, Previous ECG has undetermined rhythm, needs review Left bundle branch block is no longer Present Septal infarct is now Present See ED provider note for full interpretation and clinical  correlation Confirmed by Vangie Fuentes ([id number]) on 5/12/2024 12:09:41 AM    CT angio chest abdomen pelvis    Result Date: 5/11/2024  Interpreted By:  Octavio Henriquez and Meyers Emily STUDY: CT ANGIO CHEST ABDOMEN PELVIS;  5/11/2024 7:57 pm   INDICATION: Signs/Symptoms:rule out dissection.   COMPARISON: CT chest without contrast 04/03/2024, CT abdomen pelvis 11/15/2023        1. No thoracic or abdominal aortic aneurysm or dissection. 2. Multifocal consolidative airspace opacities throughout the right-greater-than-left lungs, likely representative of fairly pronounced pulmonary edema. There certainly could be concomitant pneumonia 3. Right-greater-than-left moderate pleural effusions with near-complete collapse of the right lower lobe. 4. Pulmonary artery dilatation measuring up to 3.4 cm, similar when compared to prior exam. Recommend correlation with pulmonary artery hypertension. 5. Bilateral native renal atrophy with unremarkable appearing left iliac fossa renal transplant. 6. 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 7. Additional findings as detailed above.   I personally reviewed the images/study, and I agree with the findings as stated above. This study was interpreted at [place], [place].   MACRO: None.   Signed by: Octavio Henriquez 5/11/2024 9:08 PM Dictation workstation:   [id number]    XR chest 2 views    Result Date: 5/11/2024  Interpreted By:  Octavio Henriquez and Fu Tianyuan STUDY: XR CHEST 2 VIEWS;  5/11/2024 1:15 am   INDICATION: Signs/Symptoms:sob, cp.   COMPARISON: Chest radiograph 04/03/2024.   ACCESSION NUMBER(S): [id number]   ORDERING CLINICIAN: ENIO MORALES   FINDINGS: PA and lateral radiographs of the chest were provided.   A vascular stent is again noted overlying the left axilla.   CARDIOMEDIASTINAL SILHOUETTE: Cardiomediastinal silhouette  is stable in size and configuration, moderately enlarged.   LUNGS: There are hazy and dense opacities over the right mid lung and bilateral lung bases with blunting of the costophrenic angles. Prominence of interstitial and perihilar markings is noted. No evidence of pneumothorax.   ABDOMEN: No remarkable upper abdominal findings.   BONES: No acute osseous changes.       1. Bilateral pleural effusions with bibasilar atelectasis or consolidation, increased from previous radiograph on 04/03/2024. 2. Prominent interstitial and perihilar markings may reflect component of pulmonary edema. 3. Unchanged moderately enlarged cardiomediastinal silhouette.   I personally reviewed the images/study and I agree with the findings as stated by resident physician Gayathri Pond MD. This study was interpreted at [facility], [place], [place].   MACRO: None   Signed by: Octavio Henriquez 5/11/2024 4:18 AM Dictation workstation:   [id number]         Assessment/Plan   Principal Problem:    Shortness of breath    Manolo Bashir is a 67 y/o M w/ PMH significant for ESRD s/p 2 renal transplants (1992, 2013 on prednisone, tacrolimus, azathioprine, baseline Cr ~5.5), prostate cancer s/p radical prostatectomy, DM2 on insulin (A1c: 7.7), MGUS, HTN presenting via EMS with weakness and SOB. Hypervolemic with bibasilar crackles, pitting BLE edema and elevated BNP; still making good urine in setting of failing renal transplant. He is currently stable on 4L O2. Diuresed initially 5/11 with 2mg IV Bumex; later gave another 3mg IV. He had an episode of hypertensive urgency accompanied by flash pulmonary edema in the ED 5/11. Currently treating empirically for right-sided pneumonia identified on CXR, likely hospital acquired from admission about a month ago. Persistent hypoglycemia likely d/t effects of long-acting insulin combined with poor PO intake d/t nausea; seems resolved this AM.      #Hospital Acquired  Pneumonia  ::CXR 5/11: There are hazy and dense opacities over the right mid lung and bilateral lung bases with blunting of the costophrenic angles.   1. Bilateral pleural effusions with bibasilar atelectasis or consolidation, increased from previous radiograph on 04/03/2024. 2. Prominent interstitial and perihilar markings may reflect component of pulmonary edema.   ::Likely hospital acquired in the setting of admission 1 month ago  - Currently satting well on 4L O2; not on O2 at home   - Continue Vancomycin (dosed by pharmacy) and Zosyn   - Follow up sputum cultures and respiratory viral panel, pending  - Continue incentive spirometry 10 times per hour while awake   - Wean O2 as tolerated     #ESRD s/p renal transplant (X2)  #Hypervolemia  :: Renal transplants in 1992 and 2013  ::B/l Cr 5.5  :: Dialysis access created 3/2024 in LUE with palpable thrill   :: TTE 4/2024 EF 54%  - Transplant nephrology following   - CTAP w/ R>L pulmonary edema and R>L moderate pleural effusions with near-complete collapse of the right lower lobe.   - Crackles resolved on exam; BLE edema improved  - Continue diuresis with IV Bumex 3 mg today  - Consider thoracentesis if pleural effusion/edema does not improve with diuresis  - Continue home Nifedipine, Hydralazine, Coreg and Indur  - Diuresis goal: net -1 to -2 L  - Hold aziathroprine in setting of pneumonia per transplant nephrology recs   - Continue home prednisone 5 mg daily  - Continue home tacrolimus at 6:30 AM/6:30 PM  - Measure daily tacrolimus level before AM dose; target 4-6  - Hgb 8 5/12  - Consider re-starting home epoetin aida injection q weekly per nephrology recs; last dose a few days ago   - Strict I&Os  - Daily weights      #Chronic HTN  Blood Pressures         5/11/2024  1602 5/11/2024  1837 5/11/2024  1950 5/12/2024  0604 5/12/2024  0936    BP: 158/77 145/76 140/70 124/62 154/65           :: H/o multiple prior admissions for hypervolemia and hypertensive urgency   -  Restarted home Hydralazine, Nifedipine, Coreg, and Indur  - Pt w/ episode of hypertensive urgency and flash pulmonary edema 5/11 afternoon with HA, SOB, severe ABD pain, nausea, and vomiting  - Ordered bedside EKG; not significantly changed from previous  - VBG wnl   - CTCAP negative for aortic dissection   - CTM BP     #DMII  :: A1C 7.7 3 months ago   :: Home regimen: insulin glargine 20 units daily and Humalog TID with meals: 100-199 2 units, 200-299 4 units, 300-399 6 units  - Q4 hour POC glucose d/t persistent hypoglycemia  - POC glucose improved to 140s-160s today  -  Continue to hold insulin    - D50 amp PRN to correct hypoglycemia      #H/o head lice  - Was prescribed permethrin cream 3/20/24  - Continue permethrin cream        VTE ppx: SubQ Heparin 5000 units q8  Diet: Adult Renal   PPX: Pantoprozole 40 mg  Bowel regimen: -  Code Status: Full code   Contact Number: Amalia Enriquez (friend) [phone]  Dispo: EZEKIEL Mcpherson, MS4

## 2024-05-12 NOTE — CONSULTS
"Vancomycin Dosing by Pharmacy- INITIAL    Manolo Bashir is a 68 y.o. year old male who Pharmacy has been consulted for vancomycin dosing for pneumonia. Based on the patient's indication and renal status this patient will be dosed based on a goal trough/random level of 15-20.     Renal function is currently declining.    Visit Vitals  /62   Pulse 67   Temp 36.7 °C (98.1 °F)   Resp 18        Lab Results   Component Value Date    CREATININE 4.59 (H) 05/12/2024    CREATININE 4.58 (H) 05/11/2024    CREATININE 4.58 (H) 05/11/2024    CREATININE 4.72 (H) 05/01/2024        Patient weight is No results found for: \"PTWEIGHT\"    No results found for: \"CULTURE\"     I/O last 3 completed shifts:  In: 300 (3.9 mL/kg) [P.O.:300]  Out: 400 (5.2 mL/kg) [Urine:400 (0.1 mL/kg/hr)]  Weight: 77.1 kg   [unfilled]    Lab Results   Component Value Date    PATIENTTEMP 37.0 05/11/2024    PATIENTTEMP 37.0 01/08/2024    PATIENTTEMP 37.0 12/16/2023          Assessment/Plan     Patient was given a LD yesterday of 2000mg.  Will initiate vancomycin maintenance, dosing off levels.   Most recent level this AM is 15.5 will supplement with 1 time dose of 500mg then check level Edwin 5/13 w/AM labs.SWZ    Follow-up level will be ordered on 5/13 at AM labs unless clinically indicated sooner.  Will continue to monitor renal function daily while on vancomycin and order serum creatinine at least every 48 hours if not already ordered.  Follow for continued vancomycin needs, clinical response, and signs/symptoms of toxicity.       Zion Anaya, PharmD       "

## 2024-05-12 NOTE — PROGRESS NOTES
Vancomycin Dosing by Pharmacy- Cessation of Therapy    Consult to pharmacy for vancomycin dosing has been discontinued by the prescriber, pharmacy will sign off at this time.    Please call pharmacy if there are further questions or re-enter a consult if vancomycin is resumed.     Bimal Fuentes, MarinaD

## 2024-05-12 NOTE — PROGRESS NOTES
Pharmacy Medication History Review    Manolo Bashir is a 68 y.o. male admitted for Shortness of breath. Pharmacy reviewed the patient's [id number] medications and allergies for accuracy.    The list below reflects the updated PTA list. Comments regarding how patient may be taking medications differently can be found in the Admit Orders Activity  Prior to Admission Medications   Prescriptions Last Dose Informant   Easy Touch Alcohol Prep Pads pads, medicated Unknown Friend   NIFEdipine ER (Adalat CC) 60 mg 24 hr tablet 2024 Friend   Sig: Take 1 tablet (60 mg) by mouth 2 times a day. Do not crush, chew, or split.   Unilet Super Thin Lancets 30 gauge misc Unknown Friend   Si each 3 times a day.   acetaminophen (Tylenol) 325 mg tablet 2024 Friend   Sig: Take 3 tablets (975 mg) by mouth every 6 hours if needed (pain).   aspirin 81 mg chewable tablet 2024 Friend   Sig: Chew 1 tablet (81 mg) once daily.   azaTHIOprine (Imuran) 50 mg tablet 2024 Friend   Sig: Take 1 tablet (50 mg) by mouth once daily.   bumetanide (Bumex) 2 mg tablet 2024 Friend   Sig: Take 1 tablet (2 mg) by mouth 2 times a day.   calcitriol (Rocaltrol) 0.25 mcg capsule 2024 Friend   Sig: Take 1 capsule (0.25 mcg) by mouth once daily. Do not start before 2023.   carvedilol (Coreg) 12.5 mg tablet 2024 Friend   Sig: TAKE THREE (3) TABLETS BY MOUTH TWICE DAILY   cinacalcet (Sensipar) 30 mg tablet 2024 Friend   Sig: Take 1 tablet (30 mg) by mouth once daily.   epoetin aida 10,000 unit/mL injection Past Week Friend   Sig: Inject 1 mL (10,000 Units) under the skin every 7 days. Do not start before 2024.   hydrALAZINE (Apresoline) 100 mg tablet 2024 Friend   Sig: Take 1 tablet (100 mg) by mouth every 8 hours.   insulin glargine (Lantus Solostar U-100 Insulin) 100 unit/mL (3 mL) pen 2024 Friend   Sig: Inject 20 Units under the skin once daily in the morning. Take as directed per  "insulin instructions.   insulin lispro (HumaLOG) 100 unit/mL injection 2024 at patient request refills Friend   Sig: Inject under the skin 3 times a day with meals. 100-199 2 units 200-299 4 units 300-399 6 units   isosorbide mononitrate ER (Imdur) 60 mg 24 hr tablet 2024 Friend   Sig: Take 1 tablet (60 mg) by mouth once daily.   lancets (Unilet Super Thin Lancets) 30 gauge misc Unknown Friend   Sig: USE TO TEST BLOOD SUGAR THREE TIMES A DAY   metoclopramide (Reglan) 5 mg tablet 2024 Friend   Sig: Take 1 tablet (5 mg) by mouth 2 times a day. 1 tablet before dinner and 1 tablet at bedtime.   pantoprazole (ProtoNix) 40 mg EC tablet 2024 Friend   Sig: Take 1 tablet (40 mg) by mouth once daily in the morning. Take before meals. Do not crush, chew, or split. Do not start before 2024.   pen needle, diabetic (TechLITE Pen Needle) 32 gauge x 5\" needle Unknown Friend   Si each 4 times a day.   permethrin (Elimite) 5 % cream 2024 Friend   Sig: Apply topically once daily. Use on scalp once. Leave on overnight, wash off in the morning. Repeat 1 week later.   pravastatin (Pravachol) 10 mg tablet 2024 Friend   Sig: Take 1 tablet (10 mg) by mouth once daily at bedtime.   predniSONE (Deltasone) 5 mg tablet Unknown Friend   Sig: Take 1 tablet (5 mg) by mouth once daily in the morning. Do not start before 2024.   sodium zirconium cyclosilicate (Lokelma) 5 gram packet 2024 at needs refills Friend   Sig: Take 5 g by mouth once daily.   syringe with needle 3 mL 25 x 5/8\" syringe Unknown Friend   Sig: Use to inject epogen.   tacrolimus (Prograf) 0.5 mg capsule 2024 Friend   Sig: Take 1.5 mg by mouth once daily in the morning AND 2 mg once daily at bedtime.   Patient taking differently: Take 1 capsule by mouth in the morning   tacrolimus (Prograf) 1 mg capsule  Friend   Sig: Take 1 mg (1 capsule) in the morning and 2mg (2 capsule) at night   tacrolimus (Protopic) 0.1 % " ointment 5/11/2024 Friend   Sig: Apply topically 2 times a day.      Facility-Administered Medications: None      The list below reflects the updated allergy list. Please review each documented allergy for additional clarification and justification.  Allergies  Reviewed by Trenton Roe on 5/12/2024   No Known Allergies         Patient accepts M2B at discharge. Pharmacy has been updated to [facility].    Sources used to complete the med history include   Allergy list sure scripts  Allergy list epic   Oarrs ( none recent )  Care taker interview ( haritha russell )  4/5/2024 nephrology progress note ( sydnie landeros )    Below are additional concerns with the patient's PTA list.  Patient instructed to call significant-other  ( haritha russell [phone] ) as she manages the patients home medications - patient significant-other is an excellent historian and knows patients medications name dose frequency and indication      Oarrs   1/22/2024 gabapentin 300 mg qty 30 ds 30   3/21/2024 oxycodone 5 mg qty 10 ds 5    Trenton Roe [facility]  Transitions of Care Pharmacy Technician  [facility]  Please reach out via [employer] Secure Chat for questions, or if no response call [place] or [employer] “[facility]”

## 2024-05-12 NOTE — PROGRESS NOTES
Pharmacy Admission Order Reconciliation Review    Manolo Bashir is a 68 y.o. male admitted for Shortness of breath. Pharmacy reviewed the patient's unreconciled admission medications.    Prior to admission medications that were reviewed and acted on by the pharmacist include:  Tacrolimus   These medications have been reconciled.     Any other unreconcilied medications have been addressed and will be ordered or held by the patient's medical team. Medications addressed by the pharmacist may be added or changed by the patient's medical team at any time.    Lyric Singh, PharmD  Transitions of Care Pharmacist  [facility]  Please reach out via Secure Chat for questions

## 2024-05-12 NOTE — CARE PLAN
The patient's goals for the shift include      The clinical goals for the shift include Pt will report feeling less fatigue      Problem: Psychosocial Needs  Goal: Collaborate with me, my family, and caregiver to identify my specific goals  Recent Flowsheet Documentation  Taken 5/12/2024 0021 by Thomas Barraza RN  Cultural Requests During Hospitalization: none  Spiritual Requests During Hospitalization: None

## 2024-05-13 ENCOUNTER — APPOINTMENT (OUTPATIENT)
Dept: DIALYSIS | Facility: HOSPITAL | Age: 69
End: 2024-05-13
Payer: COMMERCIAL

## 2024-05-13 ENCOUNTER — APPOINTMENT (OUTPATIENT)
Dept: PODIATRY | Facility: CLINIC | Age: 69
End: 2024-05-13
Payer: COMMERCIAL

## 2024-05-13 LAB
ABO GROUP (TYPE) IN BLOOD: NORMAL
ALBUMIN SERPL BCP-MCNC: 3 G/DL (ref 3.4–5)
ALP SERPL-CCNC: 40 U/L (ref 33–136)
ALT SERPL W P-5'-P-CCNC: 24 U/L (ref 10–52)
ANION GAP SERPL CALC-SCNC: 12 MMOL/L (ref 10–20)
ANTIBODY SCREEN: NORMAL
AST SERPL W P-5'-P-CCNC: 12 U/L (ref 9–39)
BASOPHILS # BLD AUTO: 0.01 X10*3/UL (ref 0–0.1)
BASOPHILS # BLD AUTO: 0.02 X10*3/UL (ref 0–0.1)
BASOPHILS NFR BLD AUTO: 0.2 %
BASOPHILS NFR BLD AUTO: 0.5 %
BILIRUB SERPL-MCNC: 0.3 MG/DL (ref 0–1.2)
BUN SERPL-MCNC: 66 MG/DL (ref 6–23)
CALCIUM SERPL-MCNC: 8.7 MG/DL (ref 8.6–10.6)
CHLORIDE SERPL-SCNC: 101 MMOL/L (ref 98–107)
CO2 SERPL-SCNC: 27 MMOL/L (ref 21–32)
CREAT SERPL-MCNC: 6 MG/DL (ref 0.5–1.3)
EGFRCR SERPLBLD CKD-EPI 2021: 10 ML/MIN/1.73M*2
EOSINOPHIL # BLD AUTO: 0.02 X10*3/UL (ref 0–0.7)
EOSINOPHIL # BLD AUTO: 0.08 X10*3/UL (ref 0–0.7)
EOSINOPHIL NFR BLD AUTO: 0.5 %
EOSINOPHIL NFR BLD AUTO: 1.9 %
ERYTHROCYTE [DISTWIDTH] IN BLOOD BY AUTOMATED COUNT: 14.4 % (ref 11.5–14.5)
ERYTHROCYTE [DISTWIDTH] IN BLOOD BY AUTOMATED COUNT: 14.6 % (ref 11.5–14.5)
GLUCOSE BLD MANUAL STRIP-MCNC: 118 MG/DL (ref 74–99)
GLUCOSE BLD MANUAL STRIP-MCNC: 147 MG/DL (ref 74–99)
GLUCOSE BLD MANUAL STRIP-MCNC: 149 MG/DL (ref 74–99)
GLUCOSE BLD MANUAL STRIP-MCNC: 202 MG/DL (ref 74–99)
GLUCOSE SERPL-MCNC: 120 MG/DL (ref 74–99)
HBV SURFACE AB SER-ACNC: 3.4 MIU/ML
HBV SURFACE AG SERPL QL IA: NONREACTIVE
HCT VFR BLD AUTO: 20.4 % (ref 41–52)
HCT VFR BLD AUTO: 22.3 % (ref 41–52)
HGB BLD-MCNC: 6.9 G/DL (ref 13.5–17.5)
HGB BLD-MCNC: 7 G/DL (ref 13.5–17.5)
IMM GRANULOCYTES # BLD AUTO: 0.07 X10*3/UL (ref 0–0.7)
IMM GRANULOCYTES # BLD AUTO: 0.14 X10*3/UL (ref 0–0.7)
IMM GRANULOCYTES NFR BLD AUTO: 1.7 % (ref 0–0.9)
IMM GRANULOCYTES NFR BLD AUTO: 3.2 % (ref 0–0.9)
LYMPHOCYTES # BLD AUTO: 0.39 X10*3/UL (ref 1.2–4.8)
LYMPHOCYTES # BLD AUTO: 0.62 X10*3/UL (ref 1.2–4.8)
LYMPHOCYTES NFR BLD AUTO: 14.6 %
LYMPHOCYTES NFR BLD AUTO: 8.8 %
MCH RBC QN AUTO: 29 PG (ref 26–34)
MCH RBC QN AUTO: 29.4 PG (ref 26–34)
MCHC RBC AUTO-ENTMCNC: 31.4 G/DL (ref 32–36)
MCHC RBC AUTO-ENTMCNC: 33.8 G/DL (ref 32–36)
MCV RBC AUTO: 87 FL (ref 80–100)
MCV RBC AUTO: 93 FL (ref 80–100)
MONOCYTES # BLD AUTO: 0.5 X10*3/UL (ref 0.1–1)
MONOCYTES # BLD AUTO: 0.57 X10*3/UL (ref 0.1–1)
MONOCYTES NFR BLD AUTO: 11.8 %
MONOCYTES NFR BLD AUTO: 12.9 %
NEUTROPHILS # BLD AUTO: 2.96 X10*3/UL (ref 1.2–7.7)
NEUTROPHILS # BLD AUTO: 3.29 X10*3/UL (ref 1.2–7.7)
NEUTROPHILS NFR BLD AUTO: 69.8 %
NEUTROPHILS NFR BLD AUTO: 74.1 %
NRBC BLD-RTO: 0 /100 WBCS (ref 0–0)
NRBC BLD-RTO: 0 /100 WBCS (ref 0–0)
PLATELET # BLD AUTO: 60 X10*3/UL (ref 150–450)
PLATELET # BLD AUTO: 78 X10*3/UL (ref 150–450)
POTASSIUM SERPL-SCNC: 4.2 MMOL/L (ref 3.5–5.3)
PROT SERPL-MCNC: 5.8 G/DL (ref 6.4–8.2)
RBC # BLD AUTO: 2.35 X10*6/UL (ref 4.5–5.9)
RBC # BLD AUTO: 2.41 X10*6/UL (ref 4.5–5.9)
RH FACTOR (ANTIGEN D): NORMAL
SODIUM SERPL-SCNC: 136 MMOL/L (ref 136–145)
TACROLIMUS BLD-MCNC: 3.4 NG/ML
WBC # BLD AUTO: 4.2 X10*3/UL (ref 4.4–11.3)
WBC # BLD AUTO: 4.4 X10*3/UL (ref 4.4–11.3)

## 2024-05-13 PROCEDURE — 2500000001 HC RX 250 WO HCPCS SELF ADMINISTERED DRUGS (ALT 637 FOR MEDICARE OP)

## 2024-05-13 PROCEDURE — 8010000001 HC DIALYSIS - HEMODIALYSIS PER DAY

## 2024-05-13 PROCEDURE — 99233 SBSQ HOSP IP/OBS HIGH 50: CPT | Performed by: INTERNAL MEDICINE

## 2024-05-13 PROCEDURE — 86706 HEP B SURFACE ANTIBODY: CPT | Performed by: STUDENT IN AN ORGANIZED HEALTH CARE EDUCATION/TRAINING PROGRAM

## 2024-05-13 PROCEDURE — 80197 ASSAY OF TACROLIMUS: CPT | Performed by: STUDENT IN AN ORGANIZED HEALTH CARE EDUCATION/TRAINING PROGRAM

## 2024-05-13 PROCEDURE — 1210000001 HC SEMI-PRIVATE ROOM DAILY

## 2024-05-13 PROCEDURE — 2500000005 HC RX 250 GENERAL PHARMACY W/O HCPCS: Performed by: INTERNAL MEDICINE

## 2024-05-13 PROCEDURE — 5A1D70Z PERFORMANCE OF URINARY FILTRATION, INTERMITTENT, LESS THAN 6 HOURS PER DAY: ICD-10-PCS | Performed by: INTERNAL MEDICINE

## 2024-05-13 PROCEDURE — 86923 COMPATIBILITY TEST ELECTRIC: CPT

## 2024-05-13 PROCEDURE — 87340 HEPATITIS B SURFACE AG IA: CPT | Performed by: STUDENT IN AN ORGANIZED HEALTH CARE EDUCATION/TRAINING PROGRAM

## 2024-05-13 PROCEDURE — 2500000006 HC RX 250 W HCPCS SELF ADMINISTERED DRUGS (ALT 637 FOR ALL PAYERS): Performed by: STUDENT IN AN ORGANIZED HEALTH CARE EDUCATION/TRAINING PROGRAM

## 2024-05-13 PROCEDURE — 2500000004 HC RX 250 GENERAL PHARMACY W/ HCPCS (ALT 636 FOR OP/ED): Performed by: INTERNAL MEDICINE

## 2024-05-13 PROCEDURE — 85025 COMPLETE CBC W/AUTO DIFF WBC: CPT | Performed by: STUDENT IN AN ORGANIZED HEALTH CARE EDUCATION/TRAINING PROGRAM

## 2024-05-13 PROCEDURE — 6350000001 HC RX 635 EPOETIN >10,000 UNITS: Mod: JZ | Performed by: STUDENT IN AN ORGANIZED HEALTH CARE EDUCATION/TRAINING PROGRAM

## 2024-05-13 PROCEDURE — 86022 PLATELET ANTIBODIES: CPT

## 2024-05-13 PROCEDURE — 99233 SBSQ HOSP IP/OBS HIGH 50: CPT | Performed by: HOSPITALIST

## 2024-05-13 PROCEDURE — 82947 ASSAY GLUCOSE BLOOD QUANT: CPT

## 2024-05-13 PROCEDURE — 2500000004 HC RX 250 GENERAL PHARMACY W/ HCPCS (ALT 636 FOR OP/ED)

## 2024-05-13 PROCEDURE — 36415 COLL VENOUS BLD VENIPUNCTURE: CPT | Performed by: STUDENT IN AN ORGANIZED HEALTH CARE EDUCATION/TRAINING PROGRAM

## 2024-05-13 PROCEDURE — 2500000002 HC RX 250 W HCPCS SELF ADMINISTERED DRUGS (ALT 637 FOR MEDICARE OP, ALT 636 FOR OP/ED): Performed by: STUDENT IN AN ORGANIZED HEALTH CARE EDUCATION/TRAINING PROGRAM

## 2024-05-13 PROCEDURE — 2500000001 HC RX 250 WO HCPCS SELF ADMINISTERED DRUGS (ALT 637 FOR MEDICARE OP): Performed by: STUDENT IN AN ORGANIZED HEALTH CARE EDUCATION/TRAINING PROGRAM

## 2024-05-13 PROCEDURE — 86901 BLOOD TYPING SEROLOGIC RH(D): CPT | Performed by: STUDENT IN AN ORGANIZED HEALTH CARE EDUCATION/TRAINING PROGRAM

## 2024-05-13 PROCEDURE — 80053 COMPREHEN METABOLIC PANEL: CPT | Performed by: STUDENT IN AN ORGANIZED HEALTH CARE EDUCATION/TRAINING PROGRAM

## 2024-05-13 PROCEDURE — 2500000004 HC RX 250 GENERAL PHARMACY W/ HCPCS (ALT 636 FOR OP/ED): Performed by: STUDENT IN AN ORGANIZED HEALTH CARE EDUCATION/TRAINING PROGRAM

## 2024-05-13 RX ORDER — INSULIN GLARGINE 100 [IU]/ML
8 INJECTION, SOLUTION SUBCUTANEOUS EVERY 24 HOURS
Status: DISCONTINUED | OUTPATIENT
Start: 2024-05-13 | End: 2024-05-18

## 2024-05-13 RX ORDER — DEXTROSE 50 % IN WATER (D50W) INTRAVENOUS SYRINGE
12.5
Status: CANCELLED | OUTPATIENT
Start: 2024-05-13

## 2024-05-13 RX ORDER — DEXTROSE 50 % IN WATER (D50W) INTRAVENOUS SYRINGE
25
Status: CANCELLED | OUTPATIENT
Start: 2024-05-13

## 2024-05-13 RX ADMIN — ISOSORBIDE MONONITRATE 60 MG: 60 TABLET, EXTENDED RELEASE ORAL at 08:23

## 2024-05-13 RX ADMIN — CALCITRIOL CAPSULES 0.25 MCG 0.25 MCG: 0.25 CAPSULE ORAL at 08:23

## 2024-05-13 RX ADMIN — PANTOPRAZOLE SODIUM 40 MG: 40 TABLET, DELAYED RELEASE ORAL at 05:41

## 2024-05-13 RX ADMIN — SODIUM ZIRCONIUM CYCLOSILICATE 5 G: 10 POWDER, FOR SUSPENSION ORAL at 08:22

## 2024-05-13 RX ADMIN — TACROLIMUS: 1 OINTMENT TOPICAL at 20:52

## 2024-05-13 RX ADMIN — METOCLOPRAMIDE 5 MG: 10 TABLET ORAL at 08:23

## 2024-05-13 RX ADMIN — NIFEDIPINE 60 MG: 60 TABLET, FILM COATED, EXTENDED RELEASE ORAL at 20:32

## 2024-05-13 RX ADMIN — ACETAMINOPHEN 975 MG: 325 TABLET ORAL at 20:33

## 2024-05-13 RX ADMIN — HYDRALAZINE HYDROCHLORIDE 100 MG: 25 TABLET ORAL at 16:42

## 2024-05-13 RX ADMIN — CINACALCET 30 MG: 30 TABLET, FILM COATED ORAL at 08:22

## 2024-05-13 RX ADMIN — HYDRALAZINE HYDROCHLORIDE 100 MG: 25 TABLET ORAL at 20:34

## 2024-05-13 RX ADMIN — ASPIRIN 81 MG 81 MG: 81 TABLET ORAL at 08:23

## 2024-05-13 RX ADMIN — Medication 1 TABLET: at 08:23

## 2024-05-13 RX ADMIN — PRAVASTATIN SODIUM 10 MG: 20 TABLET ORAL at 20:32

## 2024-05-13 RX ADMIN — METOCLOPRAMIDE 5 MG: 10 TABLET ORAL at 20:32

## 2024-05-13 RX ADMIN — CARVEDILOL 37.5 MG: 12.5 TABLET, FILM COATED ORAL at 20:32

## 2024-05-13 RX ADMIN — Medication 4 L/MIN: at 08:18

## 2024-05-13 RX ADMIN — PREDNISONE 5 MG: 5 TABLET ORAL at 08:23

## 2024-05-13 RX ADMIN — EPOETIN ALFA 10000 UNITS: 10000 SOLUTION INTRAVENOUS; SUBCUTANEOUS at 16:42

## 2024-05-13 RX ADMIN — NIFEDIPINE 60 MG: 60 TABLET, FILM COATED, EXTENDED RELEASE ORAL at 08:22

## 2024-05-13 RX ADMIN — TACROLIMUS: 1 OINTMENT TOPICAL at 08:31

## 2024-05-13 RX ADMIN — HYDRALAZINE HYDROCHLORIDE 100 MG: 25 TABLET ORAL at 04:41

## 2024-05-13 RX ADMIN — INSULIN GLARGINE 8 UNITS: 100 INJECTION, SOLUTION SUBCUTANEOUS at 08:27

## 2024-05-13 RX ADMIN — HEPARIN SODIUM 5000 UNITS: 5000 INJECTION INTRAVENOUS; SUBCUTANEOUS at 16:42

## 2024-05-13 RX ADMIN — CARVEDILOL 37.5 MG: 12.5 TABLET, FILM COATED ORAL at 08:23

## 2024-05-13 RX ADMIN — HEPARIN SODIUM 5000 UNITS: 5000 INJECTION INTRAVENOUS; SUBCUTANEOUS at 05:42

## 2024-05-13 RX ADMIN — TACROLIMUS 2 MG: 1 CAPSULE ORAL at 18:09

## 2024-05-13 ASSESSMENT — COGNITIVE AND FUNCTIONAL STATUS - GENERAL
PERSONAL GROOMING: A LITTLE
HELP NEEDED FOR BATHING: A LITTLE
EATING MEALS: A LITTLE
CLIMB 3 TO 5 STEPS WITH RAILING: A LOT
TOILETING: A LITTLE
TURNING FROM BACK TO SIDE WHILE IN FLAT BAD: A LITTLE
STANDING UP FROM CHAIR USING ARMS: A LITTLE
EATING MEALS: A LITTLE
CLIMB 3 TO 5 STEPS WITH RAILING: A LOT
STANDING UP FROM CHAIR USING ARMS: A LITTLE
DRESSING REGULAR LOWER BODY CLOTHING: A LITTLE
DRESSING REGULAR LOWER BODY CLOTHING: A LITTLE
WALKING IN HOSPITAL ROOM: A LITTLE
DRESSING REGULAR UPPER BODY CLOTHING: A LITTLE
MOVING TO AND FROM BED TO CHAIR: A LITTLE
DRESSING REGULAR UPPER BODY CLOTHING: A LITTLE
DAILY ACTIVITIY SCORE: 18
HELP NEEDED FOR BATHING: A LITTLE
MOBILITY SCORE: 18
MOBILITY SCORE: 18
WALKING IN HOSPITAL ROOM: A LITTLE
TOILETING: A LITTLE
MOVING TO AND FROM BED TO CHAIR: A LITTLE
TURNING FROM BACK TO SIDE WHILE IN FLAT BAD: A LITTLE
DAILY ACTIVITIY SCORE: 18
PERSONAL GROOMING: A LITTLE

## 2024-05-13 ASSESSMENT — PAIN - FUNCTIONAL ASSESSMENT: PAIN_FUNCTIONAL_ASSESSMENT: 0-10

## 2024-05-13 ASSESSMENT — PAIN SCALES - GENERAL: PAINLEVEL_OUTOF10: 0 - NO PAIN

## 2024-05-13 NOTE — PROGRESS NOTES
Subjective   Manolo Bashir is a 68 y.o. male reports his breathing has overall improved with diuresis.  His nostra poorly recorded so unable to tell us how much he has urinated    Objective     Exam     Vitals:    05/13/24 0441 05/13/24 0818 05/13/24 1317 05/13/24 1347   BP: 152/70 156/67     Pulse: 78 83 77    Resp: 16 20     Temp: 36.4 °C (97.5 °F)      TempSrc: Temporal  Temporal    SpO2: 92% 98%     Weight:    76.4 kg (168 lb 6.9 oz)   Height:          Intake/Output last 3 shifts:  I/O last 3 completed shifts:  In: 550 (7.1 mL/kg) [P.O.:300; IV Piggyback:250]  Out: - (0 mL/kg)   Weight: 77.1 kg     Physical Exam  Vitals reviewed.   Constitutional:       General: He is not in acute distress.     Appearance: Normal appearance. He is not ill-appearing, toxic-appearing or diaphoretic.      Comments: Breathing smooth and calm.     HENT:      Head: Normocephalic and atraumatic.   Cardiovascular:      Rate and Rhythm: Normal rate and regular rhythm.      Pulses: Normal pulses.      Heart sounds: Murmur (2 out of 6 systolic murmur heard greatest over the left upper sternal border) heard.      No friction rub. No gallop.   Pulmonary:      Effort: Pulmonary effort is normal.      Breath sounds: No wheezing, rhonchi or rales.      Comments: Anteriorly clear this morning after coughing to clear some secretions  Abdominal:      General: Bowel sounds are normal. There is no distension.      Palpations: Abdomen is soft.      Tenderness: There is no abdominal tenderness. There is no guarding or rebound.      Comments: Mild diffuse tenderness   Musculoskeletal:      Comments: Trace bilateral lower extremity edema   Skin:     General: Skin is warm and dry.   Neurological:      General: No focal deficit present.      Mental Status: He is alert.      Comments: Nonfocal   Psychiatric:         Mood and Affect: Mood normal.         Behavior: Behavior normal.            Medications   aspirin, 81 mg, oral, Daily  [Held by provider]  azaTHIOprine, 50 mg, oral, Daily  calcitriol, 0.25 mcg, oral, Daily  carvedilol, 37.5 mg, oral, BID  cinacalcet, 30 mg, oral, Daily  epoetin aida or biosimilar, 150 Units/kg, subcutaneous, Weekly  heparin (porcine), 5,000 Units, subcutaneous, q8h ZOË  hydrALAZINE, 100 mg, oral, q8h  insulin glargine, 8 Units, subcutaneous, q24h  insulin lispro, 0-5 Units, subcutaneous, TID with meals  isosorbide mononitrate ER, 60 mg, oral, Daily  lidocaine, 1 patch, transdermal, Daily  metoclopramide, 5 mg, oral, BID  multivitamin with minerals, 1 tablet, oral, Daily  NIFEdipine ER, 60 mg, oral, BID  pantoprazole, 40 mg, oral, Daily before breakfast  pravastatin, 10 mg, oral, Nightly  predniSONE, 5 mg, oral, q AM  sodium zirconium cyclosilicate, 5 g, oral, Daily  tacrolimus, 1.5 mg, oral, q AM   And  tacrolimus, 2 mg, oral, Nightly  tacrolimus, , Topical, BID       PRN medications: acetaminophen, glucagon, glucagon, ondansetron, oxygen, traMADol       Labs     All new labs reviewed:  some of the basic labs as follows -     Results from last 7 days   Lab Units 05/13/24  0947 05/12/24  0456 05/11/24  0101   WBC AUTO x10*3/uL 4.2* 5.2 6.1   HEMOGLOBIN g/dL 6.9* 8.0* 8.6*   HEMATOCRIT % 20.4* 26.0* 25.6*   PLATELETS AUTO x10*3/uL 60* 75* 132*   NEUTROS PCT AUTO % 69.8 72.0 74.1   LYMPHS PCT AUTO % 14.6 12.4 10.8   MONOS PCT AUTO % 11.8 11.3 10.7   EOS PCT AUTO % 1.9 1.6 2.1          Results from last 72 hours   Lab Units 05/13/24  0947 05/12/24  0456 05/11/24  1753   SODIUM mmol/L 136 137 141   POTASSIUM mmol/L 4.2 4.7 4.3   CHLORIDE mmol/L 101 103 105   CO2 mmol/L 27 28 29   BUN mg/dL 66* 58* 57*   CREATININE mg/dL 6.00* 4.59* 4.58*     Results from last 72 hours   Lab Units 05/13/24  0947 05/12/24  0456 05/11/24  1753 05/11/24  0101   ALK PHOS U/L 40 42  --  47   AST U/L 12 23  --  48*   ALT U/L 24 33  --  48   BILIRUBIN TOTAL mg/dL 0.3 0.4  --  0.4   ALBUMIN g/dL 3.0* 3.2* 3.3* 3.4   PROTEIN TOTAL g/dL 5.8* 5.7*  --  6.2*   LIPASE  "U/L  --   --   --  3*     Results from last 72 hours   Lab Units 05/13/24  0947 05/12/24  0456 05/11/24  1753 05/11/24  0101   GLUCOSE mg/dL 120* 157* 57* 49*     Results from last 72 hours   Lab Units 05/11/24  0542   LEUKOCYTES U  NEGATIVE   NITRITE U  NEGATIVE   WBC UR /HPF 1-5   RBC UR HPF /HPF 1-2   BLOOD UR  NEGATIVE     No results found for: \"TR1\"  Lab Results   Component Value Date    URINECULTURE CANCELED 07/19/2023    BLOODCULT No growth at 4 days -  FINAL REPORT 10/19/2023    BLOODCULT No growth at 4 days -  FINAL REPORT 10/19/2023    BLOODCULT  09/19/2023     No Growth at 1 days~No Growth at 2 days~No Growth at 3 days~NO GROWTH at 4 days - FINAL REPORT    BLOODCULT  09/19/2023     No Growth at 1 days~No Growth at 2 days~No Growth at 3 days~NO GROWTH at 4 days - FINAL REPORT            Imaging   ECG 12 lead  Normal sinus rhythm  Minimal voltage criteria for LVH, may be normal variant ( Levar product )  Septal infarct , age undetermined  Abnormal ECG  When compared with ECG of 11-MAY-2024 00:24,  Previous ECG has undetermined rhythm, needs review  Left bundle branch block is no longer Present  Septal infarct is now Present  See ED provider note for full interpretation and clinical correlation  Confirmed by Vangie Fuentes ([id number]) on 5/12/2024 12:09:41 AM     No results found for this or any previous visit from the past 1095 days.     Encounter Date: 05/11/24   ECG 12 lead   Result Value    Ventricular Rate 87    Atrial Rate 87    CT Interval 174    QRS Duration 110    QT Interval 410    QTC Calculation(Bazett) 493    P Axis 61    R Axis 33    T Axis 75    QRS Count 14    Q Onset 214    P Onset 127    P Offset 185    T Offset 419    QTC Fredericia 463    Narrative    Normal sinus rhythm  Minimal voltage criteria for LVH, may be normal variant ( [place] product )  Septal infarct , age undetermined  Abnormal ECG  When compared with ECG of 11-MAY-2024 00:24,  Previous ECG has undetermined rhythm, needs " review  Left bundle branch block is no longer Present  Septal infarct is now Present  See ED provider note for full interpretation and clinical correlation  Confirmed by Vangie Fuentes ([id number]) on 5/12/2024 12:09:41 AM        Assessment and Plan     Shortness of breath: Likely related to worsening volume overload in the setting of declining renal function.  Patient has had multiple admissions in the recent past for this same complaint.  Typically IV diuresis helps resolve his problems. Possible hap.  Patient does not have leukocytosis but his white count is higher than what it has been recently in the setting of his pancytopenia.  No significant left shift otherwise 1.8% immature granulocytes.  Procalcitonin may not be of help in setting of near end-stage renal disease  -Continue diuretics as guided by transplant nephrology.  Patient is initiating hemodialysis today per nephrology.  This will help control volume.  May need diuretics on nondialysis days  -Attempt to monitor strict ins and outs and daily weights  -Discontinue IV antibiotics (Zosyn and vancomycin) secondary to good response to diuresis and suspicion for pneumonia being low  -pulmonary tioleting.  Incentive spirometer ~10x/hr while awake and flutter valve therapy  -Wean O2 as tolerated    Renal transplant:   -For now we will continue immunosuppression regimen as directed by transplant service (tacrolimus, azathioprine, and prednisone).  Given low suspicion for pneumonia discussed with transplant possibly restarting azathioprine  -Monitor tacrolimus level daily  -Continue home Sensipar and vitamin D3/calcitriol  -Follow-up transplant nephrology recommendations  -Monitor renal function panel  -Starting hemodialysis today per nephrology  -Discontinue Lokelma  -Like to establish outpatient hemodialysis chair with the aid of /TCC    Cardiovascular: Blood pressure markedly elevated in the emergency room.  Currently blood pressure doing much better  and within normal limits to mildly elevated  -Continue home carvedilol, hydralazine, nifedipine, and Imdur  -Ideally utilize IV labetalol.  -Continue statin and aspirin    Pancytopenia: Iron is low mildly elevated ferritin and low percent sat.  Folate and B12 are normal.  Hemoglobin now less than 7  -Check type and screen with repeat CBC.  If hemoglobin remains below 7 we will give unit packed red blood cell  -Replete iron  -Check iron and ferritin  -Starting Epogen 10,000 units weekly    Diabetes mellitus: A1c from 1/24 is 7.7.  Patient presented with hypoglycemia in the setting of poor p.o. intake and vomiting with food he was taking in the setting of heavy coughing.  Patient does endorse he did continue to take his home insulin as directed despite alterations to oral intake.  Patient's blood glucoses have jumped up significantly yesterday without coverage.  Was given 8 units Lantus last night with improvement in blood sugars  -Continue Lantus.  We will titrate per corrective scale insulin replacement   -Holding mealtime lispro at this time  -Continue corrective scale insulin    GI:  -Continue PPI  -Bowel care    DVT prophylaxis    Daniel Peña MD     Of note the above was done with Dragon dictation system.  Note was proofread to minimize errors.

## 2024-05-13 NOTE — PROGRESS NOTES
Transitional Care Coordination Progress Note:  Patient discussed during interdisciplinary rounds.  Team members present: MD, TCC  Plan per Medical/Surgical team: Pt admitted with c/o weakness and SOB, team plan to continue diuresing with IV Bumex for CHELSY exacerbation.  Payer: [employer]  Status: Inpatient  Discharge disposition: Per chart review pt lives at home with children and friends. Team asked to place PT/OT consults for therapy recommendations.  Potential Barriers: none  ADOD: 5/16  Attempted to meet with pt this afternoon but he was off the unit in dialysis. Plan to attempt to meet with pt another time. Care coordinator will continue to follow for discharge planning needs.     Joyce López RN  Transitional Care Coordinator/TCC  [id number]

## 2024-05-13 NOTE — NURSING NOTE
.Report from Sending RN:    Report From: AYO Leon  Recent Surgery of Procedure: No  Baseline Level of Consciousness (LOC): A&O X3  Oxygen Use: Yes  Type: 4L NC  Diabetic: No  Last BP Med Given Day of Dialysis: coreg, nifedipine  Last Pain Med Given: none  Lab Tests to be Obtained with Dialysis: Yes  Blood Transfusion to be Given During Dialysis: No  Available IV Access: Yes  Medications to be Administered During Dialysis: No  Continuous IV Infusion Running: No  Restraints on Currently or in the Last 24 Hours: No  Hand-Off Communication: Pt had no acute event overnight and this morning. Ne start on HD, with consent. Morning medication given, not on precaution. Full code.  Dialysis Catheter Dressing: AVF  Last Dressing Change: N/A

## 2024-05-13 NOTE — PROGRESS NOTES
Manolo Bashir is a 68 y.o. male on day 2 of admission presenting with Shortness of breath.      Subjective   5/13: Patient seen resting in bed. Appears confused: when asked how he is feeling, keeps talking about his urine output, says making less. Follows commands but lethargic. Not eating well. Feeling SOB.        Objective          Vitals 24HR  Heart Rate:  [70-83]   Temp:  [36.4 °C (97.5 °F)-37.1 °C (98.8 °F)]   Resp:  [16-24]   BP: (134-156)/(59-78)   SpO2:  [92 %-98 %]     Intake/Output last 3 Shifts:    Intake/Output Summary (Last 24 hours) at 5/13/2024 1332  Last data filed at 5/13/2024 1317  Gross per 24 hour   Intake 200 ml   Output --   Net 200 ml       Physical Exam  General appearance: lethargic, slightly confused  Eyes: non-icteric  Skin: no apparent rash  Heart: rhythm regular  Lungs: b/l crackles, on 4L nc  Abdomen: soft, nt/nd  Extremities:  edema bilat  : no Cedeño  Neuro: alert to self, follows commands, lethargic, + negative asterixis  ACCESS: LUE AVG - palpable thrill, audible bruit      Relevant Results    Current Facility-Administered Medications:     acetaminophen (Tylenol) tablet 975 mg, 975 mg, oral, q6h PRN, Amanda Rivera MD    aspirin chewable tablet 81 mg, 81 mg, oral, Daily, Nadia Camarena MD, 81 mg at 05/13/24 0823    [Held by provider] azaTHIOprine (Imuran) tablet 50 mg, 50 mg, oral, Daily, Amanda Rivera MD    calcitriol (Rocaltrol) capsule 0.25 mcg, 0.25 mcg, oral, Daily, Amanda Rivera MD, 0.25 mcg at 05/13/24 0823    carvedilol (Coreg) tablet 37.5 mg, 37.5 mg, oral, BID, Amanda Rivera MD, 37.5 mg at 05/13/24 0823    cinacalcet (Sensipar) tablet 30 mg, 30 mg, oral, Daily, Amanda Rivera MD, 30 mg at 05/13/24 0822    epoetin aida-epbx (Retacrit) injection 10,000 Units, 150 Units/kg, subcutaneous, Weekly, Amanda Rivera MD    glucagon (Glucagen) injection 1 mg, 1 mg, intramuscular, q15 min PRN, Amanda Rivera MD     glucagon (Glucagen) injection 1 mg, 1 mg, intramuscular, q15 min PRN, Amanda Rivera MD    heparin (porcine) injection 5,000 Units, 5,000 Units, subcutaneous, q8h ZOË, Nadia Camarena MD, 5,000 Units at 05/13/24 0542    hydrALAZINE (Apresoline) tablet 100 mg, 100 mg, oral, q8h, Amanda Rivera MD, 100 mg at 05/13/24 0441    insulin glargine (Lantus) injection 8 Units, 8 Units, subcutaneous, q24h, Amanda Rivera MD, 8 Units at 05/13/24 0827    insulin lispro (HumaLOG) injection 0-5 Units, 0-5 Units, subcutaneous, TID with meals, Nadia Camarena MD, 5 Units at 05/12/24 2018    isosorbide mononitrate ER (Imdur) 24 hr tablet 60 mg, 60 mg, oral, Daily, Amanda Rivera MD, 60 mg at 05/13/24 0823    lidocaine 4 % patch 1 patch, 1 patch, transdermal, Daily, Nadia Camarena MD    metoclopramide (Reglan) tablet 5 mg, 5 mg, oral, BID, Amanda Rivera MD, 5 mg at 05/13/24 0823    multivitamin with minerals 1 tablet, 1 tablet, oral, Daily, Amanda Rivera MD, 1 tablet at 05/13/24 0823    NIFEdipine ER (Adalat CC) 24 hr tablet 60 mg, 60 mg, oral, BID, Amanda Rivera MD, 60 mg at 05/13/24 0822    ondansetron (Zofran) tablet 4 mg, 4 mg, oral, q8h PRN, Amanda Rivera MD, 4 mg at 05/11/24 1215    oxygen (O2) therapy, , inhalation, Continuous PRN - O2/gases, Daniel Peña MD, 4 L/min at 05/13/24 0818    pantoprazole (ProtoNix) EC tablet 40 mg, 40 mg, oral, Daily before breakfast, Amanda Rivera MD, 40 mg at 05/13/24 0541    pravastatin (Pravachol) tablet 10 mg, 10 mg, oral, Nightly, Amanda Rivera MD, 10 mg at 05/12/24 2017    predniSONE (Deltasone) tablet 5 mg, 5 mg, oral, q AM, Amanda Rivera MD, 5 mg at 05/13/24 0823    sodium zirconium cyclosilicate (Lokelma) packet 5 g, 5 g, oral, Daily, Amanda Rivera MD, 5 g at 05/13/24 0822    tacrolimus (Prograf) capsule 1.5 mg, 1.5 mg, oral, q AM, 1.5 mg at 05/12/24 0505  **AND** tacrolimus (Prograf) capsule 2 mg, 2 mg, oral, Nightly, Marion Bridges MD, 2 mg at 05/12/24 1822    tacrolimus (Protopic) 0.1 % ointment, , Topical, BID, Amanda Rivera MD, Given at 05/13/24 0831    traMADol (Ultram) tablet 50 mg, 50 mg, oral, q12h PRN, Amanda Rivera MD, 50 mg at 05/12/24 2017    Results for orders placed or performed during the hospital encounter of 05/11/24 (from the past 24 hour(s))   POCT GLUCOSE   Result Value Ref Range    POCT Glucose 403 (H) 74 - 99 mg/dL   POCT GLUCOSE   Result Value Ref Range    POCT Glucose 382 (H) 74 - 99 mg/dL   POCT GLUCOSE   Result Value Ref Range    POCT Glucose 377 (H) 74 - 99 mg/dL   POCT GLUCOSE   Result Value Ref Range    POCT Glucose 118 (H) 74 - 99 mg/dL   Comprehensive metabolic panel   Result Value Ref Range    Glucose 120 (H) 74 - 99 mg/dL    Sodium 136 136 - 145 mmol/L    Potassium 4.2 3.5 - 5.3 mmol/L    Chloride 101 98 - 107 mmol/L    Bicarbonate 27 21 - 32 mmol/L    Anion Gap 12 10 - 20 mmol/L    Urea Nitrogen 66 (H) 6 - 23 mg/dL    Creatinine 6.00 (H) 0.50 - 1.30 mg/dL    eGFR 10 (L) >60 mL/min/1.73m*2    Calcium 8.7 8.6 - 10.6 mg/dL    Albumin 3.0 (L) 3.4 - 5.0 g/dL    Alkaline Phosphatase 40 33 - 136 U/L    Total Protein 5.8 (L) 6.4 - 8.2 g/dL    AST 12 9 - 39 U/L    Bilirubin, Total 0.3 0.0 - 1.2 mg/dL    ALT 24 10 - 52 U/L   CBC and Auto Differential   Result Value Ref Range    WBC 4.2 (L) 4.4 - 11.3 x10*3/uL    nRBC 0.0 0.0 - 0.0 /100 WBCs    RBC 2.35 (L) 4.50 - 5.90 x10*6/uL    Hemoglobin 6.9 (L) 13.5 - 17.5 g/dL    Hematocrit 20.4 (L) 41.0 - 52.0 %    MCV 87 80 - 100 fL    MCH 29.4 26.0 - 34.0 pg    MCHC 33.8 32.0 - 36.0 g/dL    RDW 14.6 (H) 11.5 - 14.5 %    Platelets 60 (L) 150 - 450 x10*3/uL    Neutrophils % 69.8 40.0 - 80.0 %    Immature Granulocytes %, Automated 1.7 (H) 0.0 - 0.9 %    Lymphocytes % 14.6 13.0 - 44.0 %    Monocytes % 11.8 2.0 - 10.0 %    Eosinophils % 1.9 0.0 - 6.0 %    Basophils % 0.2 0.0 - 2.0  %    Neutrophils Absolute 2.96 1.20 - 7.70 x10*3/uL    Immature Granulocytes Absolute, Automated 0.07 0.00 - 0.70 x10*3/uL    Lymphocytes Absolute 0.62 (L) 1.20 - 4.80 x10*3/uL    Monocytes Absolute 0.50 0.10 - 1.00 x10*3/uL    Eosinophils Absolute 0.08 0.00 - 0.70 x10*3/uL    Basophils Absolute 0.01 0.00 - 0.10 x10*3/uL   Hepatitis B Surface Antigen   Result Value Ref Range    Hepatitis B Surface AG Nonreactive Nonreactive   POCT GLUCOSE   Result Value Ref Range    POCT Glucose 147 (H) 74 - 99 mg/dL        Assessment  Mr. Bashir is a 68 y.o. male with past medical history significant for ESRD s/p 1st DDKT in 1992 and 2nd DDKT in 2013 which is failing - CKD 5. He also has history include MGUS with IgG and IgA lambda, diabetes, hypertension, prostate cancer status post radical prostatectomy, urinary incontinence, HCV s/p treatment.  Admitted on 5/11/2024 for volume overload, pneumonia. Transplant Nephrology for advanced CKD in pt with of renal transplant.    1. ESRD S/P Kidney transplant x 2 - DDKT 1992 then DDKT 2013  CKD stage 5 - progressed to ESRD   - Renal allograft function: Cr 4.5  - Baseline creatinine had been 3.5 - 4.  - UO not charted  - on 4L nc, BP stable  - appears uremic - confused, volume overload, poor urine output  - s/p LUE AVG 3/21/2024 - clear for use  - plan on initiation of dialysis today due to uremia; obtained dialysis consent from patient's sister Mariya Solis, will use existing LUE AVG, plan for 2h session today   - second HD will be on 5/14 and third on 5/15; after that will be on 3x week HD schedule; pt will need outpatient dialysis chair prior to discharge  - can discontinue lokelma     2. Immunosuppression   - Monitor tacrolimus trough level   -Last tacrolimus level was 6.4 on 5/12  -Goal tacrolimus trough level is to be determined now that he is being put on dialysis  - continue tacrolimus 1.5mg in AM and 2mg in PM  - holding azathioprine due to pneumonia  - continu prednisone 5mg  daily    3. Pneumonia/Volume overload  - on 4L nc  - s/p IV bumex 3mg x 2 yesterday  - UO not documented  - if resp status not better consider thoracentesis    4. Anemia and WBC   - Hb 6.9  - sent iron studies with ferritin  - epogen 10k units weekly was ordered by Primary team    5. Hypertension   -Goal BP < 140/90 mmHg   -continue current management     * Case was discussed with primary team.  For questions, please contact transplant nephrology page x [phone]    D/w Dr. Dina Ivory MD  Nephrology Fellow PGY 5  Contact via secure chat

## 2024-05-13 NOTE — NURSING NOTE
Report to Receiving RN:    Report To: Carolyn  Time Report Called: [id number]  Hand-Off Communication: We were able to remove 1 L of fluid as ordered with no issues. Post VS /72, P 73.   Complications During Treatment: No  Ultrafiltration Treatment: Yes  Medications Administered During Dialysis: No  Blood Products Administered During Dialysis: No  Labs Sent During Dialysis: No  Heparin Drip Rate Changes: N/A  Dialysis Catheter Dressing: Na   Last Dressing Change: NA        Last Updated: 3:48 PM by JOSE BARRERA

## 2024-05-13 NOTE — PROGRESS NOTES
"Manolo Bashir is a 68 y.o. male on day 2 of admission presenting with Shortness of breath.    Subjective   Pt is resting comfortably in bed on 4L O2, sleeping but able to be roused briefly. Denies SOB, CP, ABD pain, N/V. Admits continued HA, refractory to Tylenol and tramadol. Clarified with family at bedside that although his Epoetin aida was filled 5/4, he never started the medication. Desires to be put back on the renal transplant list.        Objective     Physical Exam  Constitutional:       Appearance: He is ill-appearing.      Comments: Sleeping   HENT:      Head: Normocephalic and atraumatic.   Cardiovascular:      Rate and Rhythm: Normal rate and regular rhythm.   Pulmonary:      Comments: Coarse breath sounds d/t upper respiratory congestion in all fields  Abdominal:      General: Abdomen is flat.      Palpations: Abdomen is soft.      Tenderness: There is no abdominal tenderness.   Musculoskeletal:      Right lower leg: No edema.      Left lower leg: No edema.   Skin:     General: Skin is warm and dry.      Capillary Refill: Capillary refill takes less than 2 seconds.   Neurological:      General: No focal deficit present.   Psychiatric:         Mood and Affect: Mood normal.         Behavior: Behavior normal.       Last Recorded Vitals  Blood pressure 156/67, pulse 83, temperature 36.4 °C (97.5 °F), temperature source Temporal, resp. rate 20, height 1.727 m (5' 8\"), weight 77.1 kg (170 lb), SpO2 98%.  Intake/Output last 3 Shifts:  I/O last 3 completed shifts:  In: 550 (7.1 mL/kg) [P.O.:300; IV Piggyback:250]  Out: - (0 mL/kg)   Weight: 77.1 kg     Relevant Results          Scheduled medications  aspirin, 81 mg, oral, Daily  [Held by provider] azaTHIOprine, 50 mg, oral, Daily  calcitriol, 0.25 mcg, oral, Daily  carvedilol, 37.5 mg, oral, BID  cinacalcet, 30 mg, oral, Daily  heparin (porcine), 5,000 Units, subcutaneous, q8h ZOË  hydrALAZINE, 100 mg, oral, q8h  insulin glargine, 8 Units, subcutaneous, " q24h  insulin lispro, 0-5 Units, subcutaneous, TID with meals  isosorbide mononitrate ER, 60 mg, oral, Daily  lidocaine, 1 patch, transdermal, Daily  metoclopramide, 5 mg, oral, BID  multivitamin with minerals, 1 tablet, oral, Daily  NIFEdipine ER, 60 mg, oral, BID  pantoprazole, 40 mg, oral, Daily before breakfast  pravastatin, 10 mg, oral, Nightly  predniSONE, 5 mg, oral, q AM  sodium zirconium cyclosilicate, 5 g, oral, Daily  tacrolimus, 1.5 mg, oral, q AM   And  tacrolimus, 2 mg, oral, Nightly  tacrolimus, , Topical, BID      Continuous medications     PRN medications  PRN medications: acetaminophen, glucagon, glucagon, ondansetron, oxygen, traMADol  Results for orders placed or performed during the hospital encounter of 05/11/24 (from the past 24 hour(s))   POCT GLUCOSE   Result Value Ref Range    POCT Glucose 403 (H) 74 - 99 mg/dL   POCT GLUCOSE   Result Value Ref Range    POCT Glucose 382 (H) 74 - 99 mg/dL   POCT GLUCOSE   Result Value Ref Range    POCT Glucose 377 (H) 74 - 99 mg/dL   POCT GLUCOSE   Result Value Ref Range    POCT Glucose 118 (H) 74 - 99 mg/dL   Comprehensive metabolic panel   Result Value Ref Range    Glucose 120 (H) 74 - 99 mg/dL    Sodium 136 136 - 145 mmol/L    Potassium 4.2 3.5 - 5.3 mmol/L    Chloride 101 98 - 107 mmol/L    Bicarbonate 27 21 - 32 mmol/L    Anion Gap 12 10 - 20 mmol/L    Urea Nitrogen 66 (H) 6 - 23 mg/dL    Creatinine 6.00 (H) 0.50 - 1.30 mg/dL    eGFR 10 (L) >60 mL/min/1.73m*2    Calcium 8.7 8.6 - 10.6 mg/dL    Albumin 3.0 (L) 3.4 - 5.0 g/dL    Alkaline Phosphatase 40 33 - 136 U/L    Total Protein 5.8 (L) 6.4 - 8.2 g/dL    AST 12 9 - 39 U/L    Bilirubin, Total 0.3 0.0 - 1.2 mg/dL    ALT 24 10 - 52 U/L   CBC and Auto Differential   Result Value Ref Range    WBC 4.2 (L) 4.4 - 11.3 x10*3/uL    nRBC 0.0 0.0 - 0.0 /100 WBCs    RBC 2.35 (L) 4.50 - 5.90 x10*6/uL    Hemoglobin 6.9 (L) 13.5 - 17.5 g/dL    Hematocrit 20.4 (L) 41.0 - 52.0 %    MCV 87 80 - 100 fL    MCH 29.4 26.0 -  34.0 pg    MCHC 33.8 32.0 - 36.0 g/dL    RDW 14.6 (H) 11.5 - 14.5 %    Platelets 60 (L) 150 - 450 x10*3/uL    Neutrophils % 69.8 40.0 - 80.0 %    Immature Granulocytes %, Automated 1.7 (H) 0.0 - 0.9 %    Lymphocytes % 14.6 13.0 - 44.0 %    Monocytes % 11.8 2.0 - 10.0 %    Eosinophils % 1.9 0.0 - 6.0 %    Basophils % 0.2 0.0 - 2.0 %    Neutrophils Absolute 2.96 1.20 - 7.70 x10*3/uL    Immature Granulocytes Absolute, Automated 0.07 0.00 - 0.70 x10*3/uL    Lymphocytes Absolute 0.62 (L) 1.20 - 4.80 x10*3/uL    Monocytes Absolute 0.50 0.10 - 1.00 x10*3/uL    Eosinophils Absolute 0.08 0.00 - 0.70 x10*3/uL    Basophils Absolute 0.01 0.00 - 0.10 x10*3/uL   Hepatitis B Surface Antigen   Result Value Ref Range    Hepatitis B Surface AG Nonreactive Nonreactive   POCT GLUCOSE   Result Value Ref Range    POCT Glucose 147 (H) 74 - 99 mg/dL     ECG 12 lead    Result Date: 5/12/2024  Normal sinus rhythm Minimal voltage criteria for LVH, may be normal variant ( Levar product ) Septal infarct , age undetermined Abnormal ECG When compared with ECG of 11-MAY-2024 00:24, Previous ECG has undetermined rhythm, needs review Left bundle branch block is no longer Present Septal infarct is now Present See ED provider note for full interpretation and clinical correlation Confirmed by Vangie Fuentes ([id number]) on 5/12/2024 12:09:41 AM       Assessment/Plan   Principal Problem:    Shortness of breath    Manolo Bashir is a 67 y/o M w/ PMH significant for ESRD s/p 2 renal transplants (1992, 2013 on prednisone, tacrolimus, azathioprine, baseline Cr ~5.5), prostate cancer s/p radical prostatectomy, DM2 on insulin (A1c: 7.7), MGUS, HTN presenting via EMS with weakness and SOB. Hypervolemic with bibasilar crackles, pitting BLE edema and elevated BNP; still making urine in setting of failing renal transplant. He is currently stable on 4L O2. Diuresed initially 5/11 with 2mg IV Bumex; later gave another 3mg IV. He had an episode of hypertensive urgency  accompanied by flash pulmonary edema in the ED 5/11. Started hemodialysis 5/13.        #ESRD s/p renal transplant (X2)  #Hypervolemia  :: Renal transplants in 1992 and 2013  ::B/l Cr 5.5  :: Dialysis access created 3/2024 in LUE with palpable thrill   :: TTE 4/2024 EF 54%  - Transplant nephrology following   - CTAP w/ R>L pulmonary edema and R>L moderate pleural effusions with near-complete collapse of the right lower lobe.   - Crackles resolved on exam; BLE edema improved  - Received overnight V Bumex 3 mg   - Continue home Nifedipine, Hydralazine, Coreg and Indur  - Diuresis goal: net -1 to -2 L  - Consider thoracentesis if SOB does not improve with diuresis/dialysis  - Hold aziathroprine per transplant nephrology recs  - Hold Bactrim PJP PPX;  not recommended at this time    - Started HD today; repeat HD 5/14 and 5/15, then on 3x week schedule  - Goal tacrolimus level to be determined d/t starting HD  - Continue tacrolimus 1.5 mg AM and 2 mg PM and prednisone 5 mg daily for now per nephrology; pt will gradually be tapered down from immunosuppressants in setting of failed kidney transplant  - Hgb 6.9 5/13  - Ordered type and screen  - Restarted home epoetin aida injection 10,000 units weekly   - Strict I&Os; started external catheter today  - Daily weights          #Chronic HTN  Blood Pressures         5/12/2024  1402 5/12/2024  1921 5/13/2024  0038 5/13/2024  0441 5/13/2024  0818    BP: 134/59 138/78 148/68 152/70 156/67         :: H/o multiple prior admissions for hypervolemia and hypertensive urgency   - Restarted home Hydralazine, Nifedipine, Coreg, and Indur  - Pt w/ episode of hypertensive urgency and flash pulmonary edema 5/11 afternoon with HA, SOB, severe ABD pain, nausea, and vomiting  - Ordered bedside EKG; not significantly changed from previous  - VBG wnl   - CTCAP negative for aortic dissection   - CTM BP     #DMII  :: A1C 7.7 3 months ago   :: Home regimen: insulin glargine 20 units daily and  Humalog TID with meals: 100-199 2 units, 200-299 4 units, 300-399 6 units  - POC glucose elevated to 380s overnight  - Started basal Lantus 8 units daily (lower dose d/t recent hypoglycemia)  - CTM Q4 hour POC glucose     #H/o head lice  - Was prescribed permethrin cream 3/20/24  - Reports he last had lice 5/7, though unclear is this is true  - Perform thorough scalp exam to evaluate for active lice      #Hospital Acquired Pneumonia, resolved  ::CXR 5/11: There are hazy and dense opacities over the right mid lung and bilateral lung bases with blunting of the costophrenic angles.   1. Bilateral pleural effusions with bibasilar atelectasis or consolidation, increased from previous radiograph on 04/03/2024. 2. Prominent interstitial and perihilar markings may reflect component of pulmonary edema.   - Stopped ABX 5/12; believe pt's SOB and findings on imaging are better explained by hypervolemia; do not believe pt had pneumonia  - Remains afebrile; crackles resolved and SOB improved with diuresis  - Currently satting well on 4L O2; not on O2 at home   - Follow up sputum cultures and respiratory viral panel, pending  - Continue incentive spirometry 10 times per hour while awake   - Wean O2 as tolerated       VTE ppx: SubQ Heparin 5000 units q8  Diet: Adult Renal   PPX: Pantoprozole 40 mg  Bowel regimen: -  Code Status: Full code   Contact Number: Amalia Enriquez (friend) [phone]  Dispo: EZEKIEL Mcpherson, MS4

## 2024-05-14 ENCOUNTER — APPOINTMENT (OUTPATIENT)
Dept: DIALYSIS | Facility: HOSPITAL | Age: 69
End: 2024-05-14
Payer: COMMERCIAL

## 2024-05-14 ENCOUNTER — APPOINTMENT (OUTPATIENT)
Dept: VASCULAR MEDICINE | Facility: HOSPITAL | Age: 69
End: 2024-05-14
Payer: COMMERCIAL

## 2024-05-14 ENCOUNTER — APPOINTMENT (OUTPATIENT)
Dept: CARDIOLOGY | Facility: HOSPITAL | Age: 69
End: 2024-05-14
Payer: COMMERCIAL

## 2024-05-14 LAB
ALBUMIN SERPL BCP-MCNC: 3 G/DL (ref 3.4–5)
ALP SERPL-CCNC: 41 U/L (ref 33–136)
ALT SERPL W P-5'-P-CCNC: 21 U/L (ref 10–52)
ANION GAP SERPL CALC-SCNC: 14 MMOL/L (ref 10–20)
AST SERPL W P-5'-P-CCNC: 12 U/L (ref 9–39)
BASOPHILS # BLD AUTO: 0.02 X10*3/UL (ref 0–0.1)
BASOPHILS NFR BLD AUTO: 0.5 %
BILIRUB SERPL-MCNC: 0.4 MG/DL (ref 0–1.2)
BUN SERPL-MCNC: 50 MG/DL (ref 6–23)
CALCIUM SERPL-MCNC: 8.3 MG/DL (ref 8.6–10.6)
CHLORIDE SERPL-SCNC: 100 MMOL/L (ref 98–107)
CO2 SERPL-SCNC: 27 MMOL/L (ref 21–32)
CREAT SERPL-MCNC: 5.6 MG/DL (ref 0.5–1.3)
EGFRCR SERPLBLD CKD-EPI 2021: 10 ML/MIN/1.73M*2
EOSINOPHIL # BLD AUTO: 0.11 X10*3/UL (ref 0–0.7)
EOSINOPHIL NFR BLD AUTO: 2.9 %
ERYTHROCYTE [DISTWIDTH] IN BLOOD BY AUTOMATED COUNT: 13.9 % (ref 11.5–14.5)
GLUCOSE BLD MANUAL STRIP-MCNC: 112 MG/DL (ref 74–99)
GLUCOSE BLD MANUAL STRIP-MCNC: 116 MG/DL (ref 74–99)
GLUCOSE SERPL-MCNC: 129 MG/DL (ref 74–99)
HCT VFR BLD AUTO: 22.1 % (ref 41–52)
HGB BLD-MCNC: 7.1 G/DL (ref 13.5–17.5)
IMM GRANULOCYTES # BLD AUTO: 0.15 X10*3/UL (ref 0–0.7)
IMM GRANULOCYTES NFR BLD AUTO: 4 % (ref 0–0.9)
LEGIONELLA AG UR QL: NEGATIVE
LYMPHOCYTES # BLD AUTO: 0.55 X10*3/UL (ref 1.2–4.8)
LYMPHOCYTES NFR BLD AUTO: 14.5 %
MCH RBC QN AUTO: 29.3 PG (ref 26–34)
MCHC RBC AUTO-ENTMCNC: 32.1 G/DL (ref 32–36)
MCV RBC AUTO: 91 FL (ref 80–100)
MONOCYTES # BLD AUTO: 0.54 X10*3/UL (ref 0.1–1)
MONOCYTES NFR BLD AUTO: 14.2 %
NEUTROPHILS # BLD AUTO: 2.42 X10*3/UL (ref 1.2–7.7)
NEUTROPHILS NFR BLD AUTO: 63.9 %
NRBC BLD-RTO: 0 /100 WBCS (ref 0–0)
PLATELET # BLD AUTO: 79 X10*3/UL (ref 150–450)
POTASSIUM SERPL-SCNC: 3.9 MMOL/L (ref 3.5–5.3)
PROT SERPL-MCNC: 5.5 G/DL (ref 6.4–8.2)
RBC # BLD AUTO: 2.42 X10*6/UL (ref 4.5–5.9)
S PNEUM AG UR QL: NEGATIVE
SODIUM SERPL-SCNC: 137 MMOL/L (ref 136–145)
TACROLIMUS BLD-MCNC: 3 NG/ML
WBC # BLD AUTO: 3.8 X10*3/UL (ref 4.4–11.3)

## 2024-05-14 PROCEDURE — 84075 ASSAY ALKALINE PHOSPHATASE: CPT | Performed by: STUDENT IN AN ORGANIZED HEALTH CARE EDUCATION/TRAINING PROGRAM

## 2024-05-14 PROCEDURE — 2500000001 HC RX 250 WO HCPCS SELF ADMINISTERED DRUGS (ALT 637 FOR MEDICARE OP): Performed by: STUDENT IN AN ORGANIZED HEALTH CARE EDUCATION/TRAINING PROGRAM

## 2024-05-14 PROCEDURE — 8010000001 HC DIALYSIS - HEMODIALYSIS PER DAY

## 2024-05-14 PROCEDURE — A4217 STERILE WATER/SALINE, 500 ML: HCPCS | Performed by: PATHOLOGY

## 2024-05-14 PROCEDURE — 36415 COLL VENOUS BLD VENIPUNCTURE: CPT | Performed by: STUDENT IN AN ORGANIZED HEALTH CARE EDUCATION/TRAINING PROGRAM

## 2024-05-14 PROCEDURE — 2500000001 HC RX 250 WO HCPCS SELF ADMINISTERED DRUGS (ALT 637 FOR MEDICARE OP)

## 2024-05-14 PROCEDURE — 2500000004 HC RX 250 GENERAL PHARMACY W/ HCPCS (ALT 636 FOR OP/ED): Performed by: INTERNAL MEDICINE

## 2024-05-14 PROCEDURE — 82947 ASSAY GLUCOSE BLOOD QUANT: CPT

## 2024-05-14 PROCEDURE — 2500000005 HC RX 250 GENERAL PHARMACY W/O HCPCS: Performed by: INTERNAL MEDICINE

## 2024-05-14 PROCEDURE — 2500000004 HC RX 250 GENERAL PHARMACY W/ HCPCS (ALT 636 FOR OP/ED): Performed by: PATHOLOGY

## 2024-05-14 PROCEDURE — 1210000001 HC SEMI-PRIVATE ROOM DAILY

## 2024-05-14 PROCEDURE — 2500000002 HC RX 250 W HCPCS SELF ADMINISTERED DRUGS (ALT 637 FOR MEDICARE OP, ALT 636 FOR OP/ED): Performed by: STUDENT IN AN ORGANIZED HEALTH CARE EDUCATION/TRAINING PROGRAM

## 2024-05-14 PROCEDURE — 2500000006 HC RX 250 W HCPCS SELF ADMINISTERED DRUGS (ALT 637 FOR ALL PAYERS): Performed by: STUDENT IN AN ORGANIZED HEALTH CARE EDUCATION/TRAINING PROGRAM

## 2024-05-14 PROCEDURE — 93971 EXTREMITY STUDY: CPT | Performed by: INTERNAL MEDICINE

## 2024-05-14 PROCEDURE — 93010 ELECTROCARDIOGRAM REPORT: CPT | Performed by: STUDENT IN AN ORGANIZED HEALTH CARE EDUCATION/TRAINING PROGRAM

## 2024-05-14 PROCEDURE — 80197 ASSAY OF TACROLIMUS: CPT | Performed by: STUDENT IN AN ORGANIZED HEALTH CARE EDUCATION/TRAINING PROGRAM

## 2024-05-14 PROCEDURE — 85025 COMPLETE CBC W/AUTO DIFF WBC: CPT | Performed by: STUDENT IN AN ORGANIZED HEALTH CARE EDUCATION/TRAINING PROGRAM

## 2024-05-14 PROCEDURE — 93005 ELECTROCARDIOGRAM TRACING: CPT

## 2024-05-14 PROCEDURE — 2500000004 HC RX 250 GENERAL PHARMACY W/ HCPCS (ALT 636 FOR OP/ED)

## 2024-05-14 PROCEDURE — 87040 BLOOD CULTURE FOR BACTERIA: CPT | Performed by: STUDENT IN AN ORGANIZED HEALTH CARE EDUCATION/TRAINING PROGRAM

## 2024-05-14 PROCEDURE — 99233 SBSQ HOSP IP/OBS HIGH 50: CPT | Performed by: INTERNAL MEDICINE

## 2024-05-14 PROCEDURE — 2500000004 HC RX 250 GENERAL PHARMACY W/ HCPCS (ALT 636 FOR OP/ED): Performed by: STUDENT IN AN ORGANIZED HEALTH CARE EDUCATION/TRAINING PROGRAM

## 2024-05-14 RX ORDER — VANCOMYCIN HYDROCHLORIDE 1 G/20ML
INJECTION, POWDER, LYOPHILIZED, FOR SOLUTION INTRAVENOUS DAILY PRN
Status: DISCONTINUED | OUTPATIENT
Start: 2024-05-14 | End: 2024-05-16

## 2024-05-14 RX ORDER — ONDANSETRON HYDROCHLORIDE 2 MG/ML
4 INJECTION, SOLUTION INTRAVENOUS EVERY 8 HOURS PRN
Status: DISCONTINUED | OUTPATIENT
Start: 2024-05-14 | End: 2024-05-19 | Stop reason: HOSPADM

## 2024-05-14 RX ORDER — PERMETHRIN 50 MG/G
CREAM TOPICAL ONCE
Status: DISCONTINUED | OUTPATIENT
Start: 2024-05-17 | End: 2024-05-16

## 2024-05-14 RX ORDER — OXYCODONE HYDROCHLORIDE 5 MG/1
5 TABLET ORAL EVERY 6 HOURS PRN
Status: DISCONTINUED | OUTPATIENT
Start: 2024-05-14 | End: 2024-05-19 | Stop reason: HOSPADM

## 2024-05-14 RX ADMIN — HEPARIN SODIUM 5000 UNITS: 5000 INJECTION INTRAVENOUS; SUBCUTANEOUS at 21:40

## 2024-05-14 RX ADMIN — METOCLOPRAMIDE 5 MG: 10 TABLET ORAL at 11:00

## 2024-05-14 RX ADMIN — CALCITRIOL CAPSULES 0.25 MCG 0.25 MCG: 0.25 CAPSULE ORAL at 11:00

## 2024-05-14 RX ADMIN — CARVEDILOL 37.5 MG: 12.5 TABLET, FILM COATED ORAL at 11:00

## 2024-05-14 RX ADMIN — WATER 1500 MG: 1 INJECTION INTRAMUSCULAR; INTRAVENOUS; SUBCUTANEOUS at 21:37

## 2024-05-14 RX ADMIN — HYDRALAZINE HYDROCHLORIDE 100 MG: 25 TABLET ORAL at 20:30

## 2024-05-14 RX ADMIN — TRAMADOL HYDROCHLORIDE 50 MG: 50 TABLET, COATED ORAL at 11:00

## 2024-05-14 RX ADMIN — ISOSORBIDE MONONITRATE 60 MG: 60 TABLET, EXTENDED RELEASE ORAL at 11:00

## 2024-05-14 RX ADMIN — Medication 4 L/MIN: at 11:05

## 2024-05-14 RX ADMIN — TRAMADOL HYDROCHLORIDE 50 MG: 50 TABLET, COATED ORAL at 01:36

## 2024-05-14 RX ADMIN — PIPERACILLIN SODIUM AND TAZOBACTAM SODIUM 2.25 G: 2; .25 INJECTION, SOLUTION INTRAVENOUS at 20:15

## 2024-05-14 RX ADMIN — ACETAMINOPHEN 975 MG: 325 TABLET ORAL at 20:35

## 2024-05-14 RX ADMIN — TRAMADOL HYDROCHLORIDE 50 MG: 50 TABLET, COATED ORAL at 20:35

## 2024-05-14 RX ADMIN — TACROLIMUS 2 MG: 1 CAPSULE ORAL at 17:34

## 2024-05-14 RX ADMIN — HYDRALAZINE HYDROCHLORIDE 100 MG: 25 TABLET ORAL at 05:25

## 2024-05-14 RX ADMIN — NIFEDIPINE 60 MG: 60 TABLET, FILM COATED, EXTENDED RELEASE ORAL at 20:30

## 2024-05-14 RX ADMIN — ASPIRIN 81 MG 81 MG: 81 TABLET ORAL at 11:00

## 2024-05-14 RX ADMIN — TACROLIMUS 1.5 MG: 0.5 CAPSULE ORAL at 07:05

## 2024-05-14 RX ADMIN — PANTOPRAZOLE SODIUM 40 MG: 40 TABLET, DELAYED RELEASE ORAL at 11:00

## 2024-05-14 RX ADMIN — METOCLOPRAMIDE 5 MG: 10 TABLET ORAL at 20:30

## 2024-05-14 RX ADMIN — INSULIN GLARGINE 8 UNITS: 100 INJECTION, SOLUTION SUBCUTANEOUS at 11:01

## 2024-05-14 RX ADMIN — OXYCODONE HYDROCHLORIDE 5 MG: 5 TABLET ORAL at 17:34

## 2024-05-14 RX ADMIN — CARVEDILOL 37.5 MG: 12.5 TABLET, FILM COATED ORAL at 20:30

## 2024-05-14 RX ADMIN — NIFEDIPINE 60 MG: 60 TABLET, FILM COATED, EXTENDED RELEASE ORAL at 11:00

## 2024-05-14 RX ADMIN — Medication 1 TABLET: at 11:00

## 2024-05-14 RX ADMIN — PREDNISONE 5 MG: 5 TABLET ORAL at 11:00

## 2024-05-14 RX ADMIN — PRAVASTATIN SODIUM 10 MG: 20 TABLET ORAL at 20:30

## 2024-05-14 RX ADMIN — CINACALCET 30 MG: 30 TABLET, FILM COATED ORAL at 11:00

## 2024-05-14 RX ADMIN — HEPARIN SODIUM 5000 UNITS: 5000 INJECTION INTRAVENOUS; SUBCUTANEOUS at 05:25

## 2024-05-14 RX ADMIN — ACETAMINOPHEN 975 MG: 325 TABLET ORAL at 11:37

## 2024-05-14 ASSESSMENT — PAIN SCALES - GENERAL
PAINLEVEL_OUTOF10: 8
PAINLEVEL_OUTOF10: 0 - NO PAIN
PAINLEVEL_OUTOF10: 8

## 2024-05-14 ASSESSMENT — COGNITIVE AND FUNCTIONAL STATUS - GENERAL
TOILETING: A LITTLE
EATING MEALS: A LITTLE
DAILY ACTIVITIY SCORE: 18
HELP NEEDED FOR BATHING: A LITTLE
MOBILITY SCORE: 18
STANDING UP FROM CHAIR USING ARMS: A LITTLE
CLIMB 3 TO 5 STEPS WITH RAILING: A LITTLE
MOVING TO AND FROM BED TO CHAIR: A LITTLE
WALKING IN HOSPITAL ROOM: A LITTLE
TURNING FROM BACK TO SIDE WHILE IN FLAT BAD: A LITTLE
DRESSING REGULAR UPPER BODY CLOTHING: A LITTLE
TOILETING: A LITTLE
PERSONAL GROOMING: A LITTLE
TURNING FROM BACK TO SIDE WHILE IN FLAT BAD: A LITTLE
CLIMB 3 TO 5 STEPS WITH RAILING: A LOT
MOBILITY SCORE: 19
PERSONAL GROOMING: A LITTLE
EATING MEALS: A LITTLE
DRESSING REGULAR UPPER BODY CLOTHING: A LITTLE
DRESSING REGULAR LOWER BODY CLOTHING: A LITTLE
HELP NEEDED FOR BATHING: A LITTLE
DAILY ACTIVITIY SCORE: 19
STANDING UP FROM CHAIR USING ARMS: A LITTLE
MOVING TO AND FROM BED TO CHAIR: A LITTLE
WALKING IN HOSPITAL ROOM: A LITTLE

## 2024-05-14 ASSESSMENT — PAIN - FUNCTIONAL ASSESSMENT
PAIN_FUNCTIONAL_ASSESSMENT: 0-10
PAIN_FUNCTIONAL_ASSESSMENT: NO/DENIES PAIN

## 2024-05-14 NOTE — PROGRESS NOTES
"Vancomycin Dosing by Pharmacy- INITIAL    Manolo Bashir is a 68 y.o. year old male who Pharmacy has been consulted for vancomycin dosing for Re-start pneumonia. Based on the patient's indication and renal status this patient will be dosed based on a goal pre-HD level of 15-25.     Renal function is currently ESRD significant for iHD Tue,Thur, Sat.      Visit Vitals  /61 (BP Location: Right arm)   Pulse 85   Temp (!) 38.1 °C (100.6 °F) (Temporal)   Resp 26        Lab Results   Component Value Date    CREATININE 5.60 (H) 05/14/2024    CREATININE 6.00 (H) 05/13/2024    CREATININE 4.59 (H) 05/12/2024    CREATININE 4.58 (H) 05/11/2024        Patient weight is No results found for: \"PTWEIGHT\"    No results found for: \"CULTURE\"     I/O last 3 completed shifts:  In: 1600 (20.9 mL/kg) [I.V.:1200 (15.7 mL/kg); Other:400]  Out: 1000 (13.1 mL/kg) [Other:1000]  Weight: 76.4 kg   [unfilled]    Lab Results   Component Value Date    PATIENTTEMP 37.0 05/11/2024    PATIENTTEMP 37.0 01/08/2024    PATIENTTEMP 37.0 12/16/2023          Assessment/Plan     Patient will be given a loading dose of 1500 mg. X 1 today.    Follow-up level will be ordered on 5/16 at AM Lab Draw unless clinically indicated sooner.  Will continue to monitor renal function daily while on vancomycin and order serum creatinine at least every 48 hours if not already ordered.  Follow for continued vancomycin needs, clinical response, and signs/symptoms of toxicity.       Bimal Fuentes, PharmD       "

## 2024-05-14 NOTE — PROGRESS NOTES
"Manolo Bashir is a 68 y.o. male on day 3 of admission presenting with Shortness of breath.    Subjective   Pt went to second initial session of dialysis this morning. After dialysis, notified by nurse that pt had a low grade fever (100.1 to 100.6) and tachypnea. Pt's HR was wnl and BP elevated to 165/61; had not received morning medications yet. Pt c/o back pain but denies ABD pain and HA. Later had an episode of non-bloody nonbilious vomiting, but continued to deny ABD pain.        Objective     Physical Exam  Constitutional:       Appearance: He is ill-appearing.      Comments: Lethargic   HENT:      Head: Normocephalic and atraumatic.   Cardiovascular:      Rate and Rhythm: Normal rate and regular rhythm.   Pulmonary:      Effort: No respiratory distress.      Comments: Coarse/congested breath sounds bilaterally in all fields  Abdominal:      General: Abdomen is flat.      Palpations: Abdomen is soft.      Tenderness: There is no abdominal tenderness.   Musculoskeletal:      Right lower leg: No edema.      Left lower leg: No edema.      Comments: Swollen, nonerythematous RUE   Neurological:      Comments: AxO2; not oriented to time (year)   Psychiatric:         Mood and Affect: Mood normal.         Behavior: Behavior normal.         Last Recorded Vitals  Blood pressure 165/61, pulse 85, temperature (!) 38.1 °C (100.6 °F), temperature source Temporal, resp. rate 26, height 1.727 m (5' 8\"), weight 76.4 kg (168 lb 6.9 oz), SpO2 95%.  Intake/Output last 3 Shifts:  I/O last 3 completed shifts:  In: 1600 (20.9 mL/kg) [I.V.:1200 (15.7 mL/kg); Other:400]  Out: 1000 (13.1 mL/kg) [Other:1000]  Weight: 76.4 kg     Relevant Results  9}          Scheduled medications  aspirin, 81 mg, oral, Daily  [Held by provider] azaTHIOprine, 50 mg, oral, Daily  calcitriol, 0.25 mcg, oral, Daily  carvedilol, 37.5 mg, oral, BID  cinacalcet, 30 mg, oral, Daily  epoetin aida or biosimilar, 150 Units/kg, subcutaneous, Weekly  heparin " (porcine), 5,000 Units, subcutaneous, q8h ZOË  hydrALAZINE, 100 mg, oral, q8h  insulin glargine, 8 Units, subcutaneous, q24h  insulin lispro, 0-5 Units, subcutaneous, TID with meals  isosorbide mononitrate ER, 60 mg, oral, Daily  lidocaine, 1 patch, transdermal, Daily  metoclopramide, 5 mg, oral, BID  multivitamin with minerals, 1 tablet, oral, Daily  NIFEdipine ER, 60 mg, oral, BID  pantoprazole, 40 mg, oral, Daily before breakfast  [START ON 5/17/2024] permethrin, , Topical, Once  piperacillin-tazobactam, 2.25 g, intravenous, q8h  pravastatin, 10 mg, oral, Nightly  predniSONE, 5 mg, oral, q AM  tacrolimus, 1.5 mg, oral, q AM   And  tacrolimus, 2 mg, oral, Nightly  tacrolimus, , Topical, BID  vancomycin, 1,500 mg, intravenous, Once      Continuous medications     PRN medications  PRN medications: acetaminophen, glucagon, glucagon, ondansetron, oxyCODONE, oxygen, traMADol, vancomycin  Results for orders placed or performed during the hospital encounter of 05/11/24 (from the past 24 hour(s))   Type and screen   Result Value Ref Range    ABO TYPE O     Rh TYPE POS     ANTIBODY SCREEN NEG    Hepatitis B surface antibody   Result Value Ref Range    Hepatitis B Surface AB 3.4 <10.0 mIU/mL   POCT GLUCOSE   Result Value Ref Range    POCT Glucose 149 (H) 74 - 99 mg/dL   CBC and Auto Differential   Result Value Ref Range    WBC 4.4 4.4 - 11.3 x10*3/uL    nRBC 0.0 0.0 - 0.0 /100 WBCs    RBC 2.41 (L) 4.50 - 5.90 x10*6/uL    Hemoglobin 7.0 (L) 13.5 - 17.5 g/dL    Hematocrit 22.3 (L) 41.0 - 52.0 %    MCV 93 80 - 100 fL    MCH 29.0 26.0 - 34.0 pg    MCHC 31.4 (L) 32.0 - 36.0 g/dL    RDW 14.4 11.5 - 14.5 %    Platelets 78 (L) 150 - 450 x10*3/uL    Neutrophils % 74.1 40.0 - 80.0 %    Immature Granulocytes %, Automated 3.2 (H) 0.0 - 0.9 %    Lymphocytes % 8.8 13.0 - 44.0 %    Monocytes % 12.9 2.0 - 10.0 %    Eosinophils % 0.5 0.0 - 6.0 %    Basophils % 0.5 0.0 - 2.0 %    Neutrophils Absolute 3.29 1.20 - 7.70 x10*3/uL    Immature  Granulocytes Absolute, Automated 0.14 0.00 - 0.70 x10*3/uL    Lymphocytes Absolute 0.39 (L) 1.20 - 4.80 x10*3/uL    Monocytes Absolute 0.57 0.10 - 1.00 x10*3/uL    Eosinophils Absolute 0.02 0.00 - 0.70 x10*3/uL    Basophils Absolute 0.02 0.00 - 0.10 x10*3/uL   POCT GLUCOSE   Result Value Ref Range    POCT Glucose 202 (H) 74 - 99 mg/dL   Tacrolimus level   Result Value Ref Range    Tacrolimus  3.0 <=15.0 ng/mL   Comprehensive metabolic panel   Result Value Ref Range    Glucose 129 (H) 74 - 99 mg/dL    Sodium 137 136 - 145 mmol/L    Potassium 3.9 3.5 - 5.3 mmol/L    Chloride 100 98 - 107 mmol/L    Bicarbonate 27 21 - 32 mmol/L    Anion Gap 14 10 - 20 mmol/L    Urea Nitrogen 50 (H) 6 - 23 mg/dL    Creatinine 5.60 (H) 0.50 - 1.30 mg/dL    eGFR 10 (L) >60 mL/min/1.73m*2    Calcium 8.3 (L) 8.6 - 10.6 mg/dL    Albumin 3.0 (L) 3.4 - 5.0 g/dL    Alkaline Phosphatase 41 33 - 136 U/L    Total Protein 5.5 (L) 6.4 - 8.2 g/dL    AST 12 9 - 39 U/L    Bilirubin, Total 0.4 0.0 - 1.2 mg/dL    ALT 21 10 - 52 U/L   CBC and Auto Differential   Result Value Ref Range    WBC 3.8 (L) 4.4 - 11.3 x10*3/uL    nRBC 0.0 0.0 - 0.0 /100 WBCs    RBC 2.42 (L) 4.50 - 5.90 x10*6/uL    Hemoglobin 7.1 (L) 13.5 - 17.5 g/dL    Hematocrit 22.1 (L) 41.0 - 52.0 %    MCV 91 80 - 100 fL    MCH 29.3 26.0 - 34.0 pg    MCHC 32.1 32.0 - 36.0 g/dL    RDW 13.9 11.5 - 14.5 %    Platelets 79 (L) 150 - 450 x10*3/uL    Neutrophils % 63.9 40.0 - 80.0 %    Immature Granulocytes %, Automated 4.0 (H) 0.0 - 0.9 %    Lymphocytes % 14.5 13.0 - 44.0 %    Monocytes % 14.2 2.0 - 10.0 %    Eosinophils % 2.9 0.0 - 6.0 %    Basophils % 0.5 0.0 - 2.0 %    Neutrophils Absolute 2.42 1.20 - 7.70 x10*3/uL    Immature Granulocytes Absolute, Automated 0.15 0.00 - 0.70 x10*3/uL    Lymphocytes Absolute 0.55 (L) 1.20 - 4.80 x10*3/uL    Monocytes Absolute 0.54 0.10 - 1.00 x10*3/uL    Eosinophils Absolute 0.11 0.00 - 0.70 x10*3/uL    Basophils Absolute 0.02 0.00 - 0.10 x10*3/uL   POCT GLUCOSE    Result Value Ref Range    POCT Glucose 112 (H) 74 - 99 mg/dL     ECG 12 lead    Result Date: 5/12/2024  Normal sinus rhythm Minimal voltage criteria for LVH, may be normal variant ( Hume product ) Septal infarct , age undetermined Abnormal ECG When compared with ECG of 11-MAY-2024 00:24, Previous ECG has undetermined rhythm, needs review Left bundle branch block is no longer Present Septal infarct is now Present See ED provider note for full interpretation and clinical correlation Confirmed by Vangie Fuentes ([id number]) on 5/12/2024 12:09:41 AM    CT angio chest abdomen pelvis    Result Date: 5/11/2024  Interpreted By:  Octavio Henriquez and Meyers Emily STUDY: CT ANGIO CHEST ABDOMEN PELVIS;  5/11/2024 7:57 pm   INDICATION: Signs/Symptoms:rule out dissection.   COMPARISON: CT chest without contrast 04/03/2024, CT abdomen pelvis 11/15/2023        1. No thoracic or abdominal aortic aneurysm or dissection. 2. Multifocal consolidative airspace opacities throughout the right-greater-than-left lungs, likely representative of fairly pronounced pulmonary edema. There certainly could be concomitant pneumonia 3. Right-greater-than-left moderate pleural effusions with near-complete collapse of the right lower lobe. 4. Pulmonary artery dilatation measuring up to 3.4 cm, similar when compared to prior exam. Recommend correlation with pulmonary artery hypertension. 5. Bilateral native renal atrophy with unremarkable appearing left iliac fossa renal transplant. 6. 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 7. Additional findings as detailed above.   I personally reviewed the images/study, and I agree with the findings as stated above. This study was interpreted at [facility], [place], [place].   MACRO: None.   Signed by: Octavio Henriquez 5/11/2024 9:08 PM Dictation workstation:    [id number]                     Assessment/Plan   Principal Problem:    Shortness of breath    #ESRD s/p renal transplant (X2)  #Hypervolemia  :: Renal transplants in 1992 and 2013  ::B/l Cr 5.5  :: Dialysis access created 3/2024 in LUE with palpable thrill   :: TTE 4/2024 EF 54%  - Transplant nephrology following   - CTAP w/ R>L pulmonary edema and R>L moderate pleural effusions with near-complete collapse of the right lower lobe.   - Crackles resolved on exam; BLE edema improved  - Continue home Nifedipine, Hydralazine, Coreg and Indur  - Consider thoracentesis if SOB does not improve with diuresis/dialysis  - Hold aziathroprine per transplant nephrology recs  - Hold Bactrim PJP PPX;  not recommended at this time    - Dialysis 5/13 and 5/14; removed 1L first session and 0.5L second session  - 3rd dialysis session 5/16, then TTS   - Lasix on non-dialysis days per nephrology  - Goal tacrolimus level to be determined d/t starting HD  - Continue tacrolimus 1.5 mg AM and 2 mg PM and prednisone 5 mg daily for now per nephrology; pt will gradually be tapered down from immunosuppressants in setting of failed kidney transplant  - Hgb 7.1 5/14; type and screen active and consented for blood  - Continue home epoetin aida injection 10,000 units weekly   - Strict I&Os  - Daily weights      #Hospital Acquired Pneumonia  ::CXR 5/11: There are hazy and dense opacities over the right mid lung and bilateral lung bases with blunting of the costophrenic angles.   1. Bilateral pleural effusions with bibasilar atelectasis or consolidation, increased from previous radiograph on 04/03/2024. 2. Prominent interstitial and perihilar markings may reflect component of pulmonary edema.   - Pt developed low grade fever to 100.6 and tachypnea - Continued to sat well on 2-4L O2. Later developed nausea and vomiting  - Restarted ABX (vancomycin, Zosyn) 5/13   - Gave Tylenol and Zofran   - Ordered repeat EKG  - Ordered peripheral blood  cultures  - Follow up sputum cultures and respiratory viral panel, pending  - Continue holding aziathoprine   - Continue incentive spirometry 10 times per hour while awake   - Wean O2 as tolerated    #RUE swelling  - Right upper extremity swollen, nonerythematous, and nontender to palpation  - No IV medication has gone through that arm per nursing  - Does not appear to be cellulitis   - Follow up venous duplex RUE      #Chronic HTN  :: H/o multiple prior admissions for hypervolemia and hypertensive urgency   - Restarted home Hydralazine, Nifedipine, Coreg, and Indur  - Pt w/ episode of hypertensive urgency and flash pulmonary edema 5/11 afternoon with HA, SOB, severe ABD pain, nausea, and vomiting  - Ordered bedside EKG 5/11; not significantly changed from previous  - VBG wnl   - CTCAP negative for aortic dissection   - CTM BP     #DMII  :: A1C 7.7 3 months ago   :: Home regimen: insulin glargine 20 units daily and Humalog TID with meals: 100-199 2 units, 200-299 4 units, 300-399 6 units  - POCT glucose in 140s yesterday and 112-202 today  - Glucose 129 on CMP 5/14  - Continue basal Lantus 8 units daily (lower dose d/t recent hypoglycemia)  - CTM Q4 hour POC glucose     #H/o head lice  - No active lice on scalp exam 5/13  - No need for isolation  - Received permethrin application 5/7; next application 5/17 to complete course            VTE ppx: SubQ Heparin 5000 units q8  Diet: Adult Renal   PPX: Pantoprozole 40 mg  Bowel regimen: -  Code Status: Full code   Contact Number: Amalia Enriqeuz (friend) [phone]  Dispo: TBSAMANTHA Mcpherson, MS4

## 2024-05-14 NOTE — NURSING NOTE
.Report from Sending RN:    Report From: AYO Guzman  Recent Surgery of Procedure: No  Baseline Level of Consciousness (LOC): A&O X3  Oxygen Use: Yes  Type: 2L NC  Diabetic: Yes  Last BP Med Given Day of Dialysis: Hydralazine  Last Pain Med Given: tramadol  Lab Tests to be Obtained with Dialysis: Yes  Blood Transfusion to be Given During Dialysis: No  Available IV Access: Yes  Medications to be Administered During Dialysis: No  Continuous IV Infusion Running: No  Restraints on Currently or in the Last 24 Hours: No  Hand-Off Communication: Pt had no acute event overnight, vital signs stable. Not on precaution, full code.   Dialysis Catheter Dressing: AVG  Last Dressing Change: N/A

## 2024-05-14 NOTE — PROGRESS NOTES
Social Work Note:    FLORENTINO spoke with the patient on this date about dialysis. Patient reported that he had been at [facility] in the past.  He reported that he would like MWF First shift.  PT/OT need to see patient.  FLORENTINO sent information to [facility] Intake.  FLORENTINO will continue to follow    RU Balderas, KWABENAS

## 2024-05-14 NOTE — CARE PLAN
The patient's goals for the shift include      The clinical goals for the shift include Patient to remain hemodynamically stable throughout the shift

## 2024-05-14 NOTE — CARE PLAN
The patient's goals for the shift include      The clinical goals for the shift include pt will remain free and safe from falls and injury until end of shift      Problem: Pain  Goal: My pain/discomfort is manageable  Outcome: Progressing     Problem: Safety  Goal: Patient will be injury free during hospitalization  Outcome: Progressing  Goal: I will remain free of falls  Outcome: Progressing     Problem: Daily Care  Goal: Daily care needs are met  Outcome: Progressing     Problem: Psychosocial Needs  Goal: Demonstrates ability to cope with hospitalization/illness  Outcome: Progressing  Goal: Collaborate with me, my family, and caregiver to identify my specific goals  Outcome: Progressing     Problem: Pain - Adult  Goal: Verbalizes/displays adequate comfort level or baseline comfort level  Outcome: Progressing     Problem: Safety - Adult  Goal: Free from fall injury  Outcome: Progressing     Problem: Discharge Planning  Goal: Discharge to home or other facility with appropriate resources  Outcome: Progressing     Problem: Diabetes  Goal: Achieve decreasing blood glucose levels by end of shift  Outcome: Progressing  Goal: Increase stability of blood glucose readings by end of shift  Outcome: Progressing  Goal: Decrease in ketones present in urine by end of shift  Outcome: Progressing  Goal: Maintain electrolyte levels within acceptable range throughout shift  Outcome: Progressing  Goal: Maintain glucose levels >70mg/dl to <250mg/dl throughout shift  Outcome: Progressing  Goal: No changes in neurological exam by end of shift  Outcome: Progressing  Goal: Learn about and adhere to nutrition recommendations by end of shift  Outcome: Progressing  Goal: Vital signs within normal range for age by end of shift  Outcome: Progressing  Goal: Increase self care and/or family involovement by end of shift  Outcome: Progressing  Goal: Receive DSME education by end of shift  Outcome: Progressing

## 2024-05-14 NOTE — NURSING NOTE
.Report to Receiving RN:    Report To: AYO Bell  Time Report Called: [id number]  Hand-Off Communication: Pt tolerated HD well with no issue. Fluid removal 0.5 Liter Post /68 HR 88  Complications During Treatment: No  Ultrafiltration Treatment: No  Medications Administered During Dialysis: Yes, tacrolimus   Blood Products Administered During Dialysis: No  Labs Sent During Dialysis: No  Heparin Drip Rate Changes: No  Dialysis Catheter Dressing: AVG  Last Dressing Change: N/A

## 2024-05-14 NOTE — PROGRESS NOTES
Manolo Bashir is a 68 y.o. male on day 3 of admission presenting with Shortness of breath.      Subjective   5/14: Patient seen during dialysis. More alert today. Complaining of back pain. Tolerated first HD well yesterday as he slept through it. Breathing is stable - on 2L nc.        Objective          Vitals 24HR  Heart Rate:  [63-89]   Temp:  [36.4 °C (97.5 °F)-38.1 °C (100.6 °F)]   Resp:  [16-28]   BP: (113-165)/(50-61)   SpO2:  [95 %-100 %]     Intake/Output last 3 Shifts:    Intake/Output Summary (Last 24 hours) at 5/14/2024 1445  Last data filed at 5/14/2024 0916  Gross per 24 hour   Intake 1600 ml   Output 1906 ml   Net -306 ml       Physical Exam  General appearance: mild distress from back pain  Eyes: non-icteric  Skin: no apparent rash  Heart: rhythm regular  Lungs: b/l crackles, on 2L nc  Abdomen: soft, nt/nd  Extremities: trace leg edema bilat  : no Cedeño  Neuro: follows commands, looks tired but a little more alert today  ACCESS: LUE AVG - palpable thrill, audible bruit      Relevant Results    Current Facility-Administered Medications:     acetaminophen (Tylenol) tablet 975 mg, 975 mg, oral, q6h PRN, Amanda Rivera MD, 975 mg at 05/14/24 1137    aspirin chewable tablet 81 mg, 81 mg, oral, Daily, Nadia Camarena MD, 81 mg at 05/14/24 1100    [Held by provider] azaTHIOprine (Imuran) tablet 50 mg, 50 mg, oral, Daily, Amanda Rivera MD    calcitriol (Rocaltrol) capsule 0.25 mcg, 0.25 mcg, oral, Daily, Amanda Rivera MD, 0.25 mcg at 05/14/24 1100    carvedilol (Coreg) tablet 37.5 mg, 37.5 mg, oral, BID, Amanda Rivera MD, 37.5 mg at 05/14/24 1100    cinacalcet (Sensipar) tablet 30 mg, 30 mg, oral, Daily, Amanda Rivera MD, 30 mg at 05/14/24 1100    epoetin aida (Epogen,Procrit) injection 10,000 Units, 150 Units/kg, subcutaneous, Weekly, Amanda Rivera MD, 10,000 Units at 05/13/24 1642    glucagon (Glucagen) injection 1 mg, 1 mg, intramuscular,  q15 min PRN, Amanda Rivera MD    glucagon (Glucagen) injection 1 mg, 1 mg, intramuscular, q15 min PRN, Amanda Rivera MD    heparin (porcine) injection 5,000 Units, 5,000 Units, subcutaneous, q8h ZOË, Nadia Camarena MD, 5,000 Units at 05/14/24 0525    hydrALAZINE (Apresoline) tablet 100 mg, 100 mg, oral, q8h, Amanda Rivera MD, 100 mg at 05/14/24 0525    insulin glargine (Lantus) injection 8 Units, 8 Units, subcutaneous, q24h, Amanda Rivera MD, 8 Units at 05/14/24 1101    insulin lispro (HumaLOG) injection 0-5 Units, 0-5 Units, subcutaneous, TID, Nadia Camarena MD, 5 Units at 05/12/24 2018    isosorbide mononitrate ER (Imdur) 24 hr tablet 60 mg, 60 mg, oral, Daily, Amanda Rivera MD, 60 mg at 05/14/24 1100    lidocaine 4 % patch 1 patch, 1 patch, transdermal, Daily, Nadia Camarena MD    metoclopramide (Reglan) tablet 5 mg, 5 mg, oral, BID, Amanda Rivera MD, 5 mg at 05/14/24 1100    multivitamin with minerals 1 tablet, 1 tablet, oral, Daily, Amanda Rivera MD, 1 tablet at 05/14/24 1100    NIFEdipine ER (Adalat CC) 24 hr tablet 60 mg, 60 mg, oral, BID, Amanda Rivera MD, 60 mg at 05/14/24 1100    ondansetron (Zofran) injection 4 mg, 4 mg, intravenous, q8h PRN, Amanda Rivera MD    ondansetron (Zofran) tablet 4 mg, 4 mg, oral, q8h PRN, Amanda Rivera MD, 4 mg at 05/11/24 1215    oxyCODONE (Roxicodone) immediate release tablet 5 mg, 5 mg, oral, q6h PRN, Amanda Rivera MD    oxygen (O2) therapy, , inhalation, Continuous PRN - O2/gases, Daniel Peña MD, 4 L/min at 05/14/24 1105    pantoprazole (ProtoNix) EC tablet 40 mg, 40 mg, oral, Daily before breakfast, Amanda Rivera MD, 40 mg at 05/14/24 1100    [START ON 5/17/2024] permethrin (Elimite) 5 % cream, , Topical, Once, Amanda Rivera MD    piperacillin-tazobactam-dextrose (Zosyn) IV 2.25 g, 2.25 g, intravenous, q8h, Amanda WILLARD  Lizbeth Rivera MD    pravastatin (Pravachol) tablet 10 mg, 10 mg, oral, Nightly, Amanda Rivera MD, 10 mg at 05/13/24 2032    predniSONE (Deltasone) tablet 5 mg, 5 mg, oral, q AM, Amanda Rivera MD, 5 mg at 05/14/24 1100    tacrolimus (Prograf) capsule 1.5 mg, 1.5 mg, oral, q AM, 1.5 mg at 05/14/24 0705 **AND** tacrolimus (Prograf) capsule 2 mg, 2 mg, oral, Nightly, Marion Bridges MD, 2 mg at 05/13/24 1809    tacrolimus (Protopic) 0.1 % ointment, , Topical, BID, Amanda Rivera MD, Given at 05/13/24 2052    traMADol (Ultram) tablet 50 mg, 50 mg, oral, q12h PRN, Amanda Rivera MD, 50 mg at 05/14/24 1100    vancomycin (Vancocin) in dextrose 5 % water (D5W) 500 mL IV 1,500 mg, 1,500 mg, intravenous, Once, Bimal Fuentes, PharmD    vancomycin (Vancocin) pharmacy to dose - pharmacy monitoring, , miscellaneous, Daily PRN, Amanda Rivera MD    Results for orders placed or performed during the hospital encounter of 05/11/24 (from the past 24 hour(s))   POCT GLUCOSE   Result Value Ref Range    POCT Glucose 149 (H) 74 - 99 mg/dL   CBC and Auto Differential   Result Value Ref Range    WBC 4.4 4.4 - 11.3 x10*3/uL    nRBC 0.0 0.0 - 0.0 /100 WBCs    RBC 2.41 (L) 4.50 - 5.90 x10*6/uL    Hemoglobin 7.0 (L) 13.5 - 17.5 g/dL    Hematocrit 22.3 (L) 41.0 - 52.0 %    MCV 93 80 - 100 fL    MCH 29.0 26.0 - 34.0 pg    MCHC 31.4 (L) 32.0 - 36.0 g/dL    RDW 14.4 11.5 - 14.5 %    Platelets 78 (L) 150 - 450 x10*3/uL    Neutrophils % 74.1 40.0 - 80.0 %    Immature Granulocytes %, Automated 3.2 (H) 0.0 - 0.9 %    Lymphocytes % 8.8 13.0 - 44.0 %    Monocytes % 12.9 2.0 - 10.0 %    Eosinophils % 0.5 0.0 - 6.0 %    Basophils % 0.5 0.0 - 2.0 %    Neutrophils Absolute 3.29 1.20 - 7.70 x10*3/uL    Immature Granulocytes Absolute, Automated 0.14 0.00 - 0.70 x10*3/uL    Lymphocytes Absolute 0.39 (L) 1.20 - 4.80 x10*3/uL    Monocytes Absolute 0.57 0.10 - 1.00 x10*3/uL    Eosinophils Absolute 0.02 0.00 -  0.70 x10*3/uL    Basophils Absolute 0.02 0.00 - 0.10 x10*3/uL   POCT GLUCOSE   Result Value Ref Range    POCT Glucose 202 (H) 74 - 99 mg/dL   Tacrolimus level   Result Value Ref Range    Tacrolimus  3.0 <=15.0 ng/mL   Comprehensive metabolic panel   Result Value Ref Range    Glucose 129 (H) 74 - 99 mg/dL    Sodium 137 136 - 145 mmol/L    Potassium 3.9 3.5 - 5.3 mmol/L    Chloride 100 98 - 107 mmol/L    Bicarbonate 27 21 - 32 mmol/L    Anion Gap 14 10 - 20 mmol/L    Urea Nitrogen 50 (H) 6 - 23 mg/dL    Creatinine 5.60 (H) 0.50 - 1.30 mg/dL    eGFR 10 (L) >60 mL/min/1.73m*2    Calcium 8.3 (L) 8.6 - 10.6 mg/dL    Albumin 3.0 (L) 3.4 - 5.0 g/dL    Alkaline Phosphatase 41 33 - 136 U/L    Total Protein 5.5 (L) 6.4 - 8.2 g/dL    AST 12 9 - 39 U/L    Bilirubin, Total 0.4 0.0 - 1.2 mg/dL    ALT 21 10 - 52 U/L   CBC and Auto Differential   Result Value Ref Range    WBC 3.8 (L) 4.4 - 11.3 x10*3/uL    nRBC 0.0 0.0 - 0.0 /100 WBCs    RBC 2.42 (L) 4.50 - 5.90 x10*6/uL    Hemoglobin 7.1 (L) 13.5 - 17.5 g/dL    Hematocrit 22.1 (L) 41.0 - 52.0 %    MCV 91 80 - 100 fL    MCH 29.3 26.0 - 34.0 pg    MCHC 32.1 32.0 - 36.0 g/dL    RDW 13.9 11.5 - 14.5 %    Platelets 79 (L) 150 - 450 x10*3/uL    Neutrophils % 63.9 40.0 - 80.0 %    Immature Granulocytes %, Automated 4.0 (H) 0.0 - 0.9 %    Lymphocytes % 14.5 13.0 - 44.0 %    Monocytes % 14.2 2.0 - 10.0 %    Eosinophils % 2.9 0.0 - 6.0 %    Basophils % 0.5 0.0 - 2.0 %    Neutrophils Absolute 2.42 1.20 - 7.70 x10*3/uL    Immature Granulocytes Absolute, Automated 0.15 0.00 - 0.70 x10*3/uL    Lymphocytes Absolute 0.55 (L) 1.20 - 4.80 x10*3/uL    Monocytes Absolute 0.54 0.10 - 1.00 x10*3/uL    Eosinophils Absolute 0.11 0.00 - 0.70 x10*3/uL    Basophils Absolute 0.02 0.00 - 0.10 x10*3/uL   POCT GLUCOSE   Result Value Ref Range    POCT Glucose 112 (H) 74 - 99 mg/dL        Assessment  Mr. Bashir is a 68 y.o. male with past medical history significant for ESRD s/p 1st DDKT in 1992 and 2nd DDKT in  2013 which is failing - CKD 5. He also has history include MGUS with IgG and IgA lambda, diabetes, hypertension, prostate cancer status post radical prostatectomy, urinary incontinence, HCV s/p treatment.  Admitted on 5/11/2024 for volume overload, pneumonia. Transplant Nephrology for advanced CKD in pt with of renal transplant.    1. Failed kidney transplant - initiated back on HD this admission for uremia  ESRD S/P Kidney transplant x 2 - DDKT 1992 then DDKT 2013  - Baseline creatinine had been 3.5 - 4  - labs notable for K 3.9  - UO not charted  - on 2L nc, BP stable  - has LUE AVG 3/21/2024 - clear for use  - s/p first HD on 5/13 with 1L UF  - s/p second HD today on 5/14 with 0.5L UF  - hold off on HD tomorrow to give patient a break (requested by patient's daughter and sister); next HD will be on 5/16 and tentatively will plan on TTS HD schedule while inpatient; pt will need outpatient dialysis chair prior to discharge    2. Immunosuppression   - Monitor tacrolimus trough level   -Last tacrolimus level was 3.0 on 5/14  -Goal tacrolimus trough level is to be determined now that he is being put on dialysis  - On tacrolimus 1.5mg in AM and 2mg in PM  - holding azathioprine due to pneumonia  - continue prednisone 5mg daily  - continue current immunosuppression    3. Pneumonia/Volume overload  - on 2L nc  - s/p IV bumex 3mg x 2 on 5/12  - UO not documented  - can give lasix on non-HD days    4. Anemia and WBC   - Hb 7.1  - iron studies 5/11: iron low at 22, T sats low at 13%; ferritin high at 602  - s/p epogen 10k units on 5/13    5. Hypertension   -Goal BP < 140/90 mmHg   -continue current management     * Case was discussed with primary team.  For questions, please contact transplant nephrology page x [phone]    D/w Dr. Yuliana Ivory MD  Nephrology Fellow PGY 5  Contact via secure chat

## 2024-05-15 ENCOUNTER — APPOINTMENT (OUTPATIENT)
Dept: PRIMARY CARE | Facility: CLINIC | Age: 69
End: 2024-05-15
Payer: COMMERCIAL

## 2024-05-15 LAB
ADENOVIRUS RVP, VIRC: NOT DETECTED
ALBUMIN SERPL BCP-MCNC: 3 G/DL (ref 3.4–5)
ALP SERPL-CCNC: 43 U/L (ref 33–136)
ALT SERPL W P-5'-P-CCNC: 16 U/L (ref 10–52)
ANION GAP SERPL CALC-SCNC: 13 MMOL/L (ref 10–20)
AST SERPL W P-5'-P-CCNC: 12 U/L (ref 9–39)
ATRIAL RATE: 357 BPM
BASOPHILS # BLD AUTO: 0.01 X10*3/UL (ref 0–0.1)
BASOPHILS NFR BLD AUTO: 0.3 %
BILIRUB SERPL-MCNC: 0.3 MG/DL (ref 0–1.2)
BLOOD EXPIRATION DATE: NORMAL
BUN SERPL-MCNC: 37 MG/DL (ref 6–23)
CALCIUM SERPL-MCNC: 8.4 MG/DL (ref 8.6–10.6)
CHLORIDE SERPL-SCNC: 97 MMOL/L (ref 98–107)
CO2 SERPL-SCNC: 29 MMOL/L (ref 21–32)
CREAT SERPL-MCNC: 5.73 MG/DL (ref 0.5–1.3)
DISPENSE STATUS: NORMAL
EGFRCR SERPLBLD CKD-EPI 2021: 10 ML/MIN/1.73M*2
ENTEROVIRUS/RHINOVIRUS RVP, VIRC: NOT DETECTED
EOSINOPHIL # BLD AUTO: 0.11 X10*3/UL (ref 0–0.7)
EOSINOPHIL NFR BLD AUTO: 3.4 %
ERYTHROCYTE [DISTWIDTH] IN BLOOD BY AUTOMATED COUNT: 13.8 % (ref 11.5–14.5)
GLUCOSE BLD MANUAL STRIP-MCNC: 167 MG/DL (ref 74–99)
GLUCOSE BLD MANUAL STRIP-MCNC: 184 MG/DL (ref 74–99)
GLUCOSE BLD MANUAL STRIP-MCNC: 188 MG/DL (ref 74–99)
GLUCOSE SERPL-MCNC: 176 MG/DL (ref 74–99)
HCT VFR BLD AUTO: 20.7 % (ref 41–52)
HGB BLD-MCNC: 6.8 G/DL (ref 13.5–17.5)
HUMAN BOCAVIRUS RVP, VIRC: NOT DETECTED
HUMAN CORONAVIRUS RVP, VIRC: NOT DETECTED
IMM GRANULOCYTES # BLD AUTO: 0.12 X10*3/UL (ref 0–0.7)
IMM GRANULOCYTES NFR BLD AUTO: 3.7 % (ref 0–0.9)
INFLUENZA A , VIRC: NOT DETECTED
INFLUENZA A H1N1-09 , VIRC: NOT DETECTED
INFLUENZA B PCR, VIRC: NOT DETECTED
LYMPHOCYTES # BLD AUTO: 0.48 X10*3/UL (ref 1.2–4.8)
LYMPHOCYTES NFR BLD AUTO: 14.7 %
MCH RBC QN AUTO: 29.6 PG (ref 26–34)
MCHC RBC AUTO-ENTMCNC: 32.9 G/DL (ref 32–36)
MCV RBC AUTO: 90 FL (ref 80–100)
METAPNEUMOVIRUS , VIRC: NOT DETECTED
MONOCYTES # BLD AUTO: 0.4 X10*3/UL (ref 0.1–1)
MONOCYTES NFR BLD AUTO: 12.2 %
NEUTROPHILS # BLD AUTO: 2.15 X10*3/UL (ref 1.2–7.7)
NEUTROPHILS NFR BLD AUTO: 65.7 %
NRBC BLD-RTO: 0 /100 WBCS (ref 0–0)
PARAINFLUENZA PCR, VIRC: NOT DETECTED
PLATELET # BLD AUTO: 70 X10*3/UL (ref 150–450)
POTASSIUM SERPL-SCNC: 3.6 MMOL/L (ref 3.5–5.3)
PRODUCT BLOOD TYPE: 5100
PRODUCT CODE: NORMAL
PROT SERPL-MCNC: 5.9 G/DL (ref 6.4–8.2)
Q ONSET: 215 MS
QRS COUNT: 12 BEATS
QRS DURATION: 150 MS
QT INTERVAL: 412 MS
QTC CALCULATION(BAZETT): 444 MS
QTC FREDERICIA: 433 MS
R AXIS: -33 DEGREES
RBC # BLD AUTO: 2.3 X10*6/UL (ref 4.5–5.9)
RSV PCR, RVP, VIRC: NOT DETECTED
SODIUM SERPL-SCNC: 135 MMOL/L (ref 136–145)
T AXIS: 260 DEGREES
T OFFSET: 421 MS
TACROLIMUS BLD-MCNC: 6.1 NG/ML
UNIT ABO: NORMAL
UNIT NUMBER: NORMAL
UNIT RH: NORMAL
UNIT VOLUME: 350
VENTRICULAR RATE: 70 BPM
WBC # BLD AUTO: 3.3 X10*3/UL (ref 4.4–11.3)
XM INTEP: NORMAL

## 2024-05-15 PROCEDURE — 82947 ASSAY GLUCOSE BLOOD QUANT: CPT

## 2024-05-15 PROCEDURE — 97161 PT EVAL LOW COMPLEX 20 MIN: CPT | Mod: GP | Performed by: PHYSICAL THERAPIST

## 2024-05-15 PROCEDURE — 36430 TRANSFUSION BLD/BLD COMPNT: CPT

## 2024-05-15 PROCEDURE — 80197 ASSAY OF TACROLIMUS: CPT | Performed by: STUDENT IN AN ORGANIZED HEALTH CARE EDUCATION/TRAINING PROGRAM

## 2024-05-15 PROCEDURE — 80053 COMPREHEN METABOLIC PANEL: CPT | Performed by: STUDENT IN AN ORGANIZED HEALTH CARE EDUCATION/TRAINING PROGRAM

## 2024-05-15 PROCEDURE — P9040 RBC LEUKOREDUCED IRRADIATED: HCPCS

## 2024-05-15 PROCEDURE — 2500000001 HC RX 250 WO HCPCS SELF ADMINISTERED DRUGS (ALT 637 FOR MEDICARE OP): Performed by: STUDENT IN AN ORGANIZED HEALTH CARE EDUCATION/TRAINING PROGRAM

## 2024-05-15 PROCEDURE — 2500000002 HC RX 250 W HCPCS SELF ADMINISTERED DRUGS (ALT 637 FOR MEDICARE OP, ALT 636 FOR OP/ED): Performed by: STUDENT IN AN ORGANIZED HEALTH CARE EDUCATION/TRAINING PROGRAM

## 2024-05-15 PROCEDURE — 2500000004 HC RX 250 GENERAL PHARMACY W/ HCPCS (ALT 636 FOR OP/ED): Performed by: STUDENT IN AN ORGANIZED HEALTH CARE EDUCATION/TRAINING PROGRAM

## 2024-05-15 PROCEDURE — 99231 SBSQ HOSP IP/OBS SF/LOW 25: CPT | Performed by: STUDENT IN AN ORGANIZED HEALTH CARE EDUCATION/TRAINING PROGRAM

## 2024-05-15 PROCEDURE — 1210000001 HC SEMI-PRIVATE ROOM DAILY

## 2024-05-15 PROCEDURE — 85025 COMPLETE CBC W/AUTO DIFF WBC: CPT | Performed by: STUDENT IN AN ORGANIZED HEALTH CARE EDUCATION/TRAINING PROGRAM

## 2024-05-15 PROCEDURE — 2500000004 HC RX 250 GENERAL PHARMACY W/ HCPCS (ALT 636 FOR OP/ED)

## 2024-05-15 PROCEDURE — 36415 COLL VENOUS BLD VENIPUNCTURE: CPT | Performed by: STUDENT IN AN ORGANIZED HEALTH CARE EDUCATION/TRAINING PROGRAM

## 2024-05-15 PROCEDURE — 2500000006 HC RX 250 W HCPCS SELF ADMINISTERED DRUGS (ALT 637 FOR ALL PAYERS): Performed by: STUDENT IN AN ORGANIZED HEALTH CARE EDUCATION/TRAINING PROGRAM

## 2024-05-15 PROCEDURE — 99233 SBSQ HOSP IP/OBS HIGH 50: CPT | Performed by: INTERNAL MEDICINE

## 2024-05-15 PROCEDURE — 2500000004 HC RX 250 GENERAL PHARMACY W/ HCPCS (ALT 636 FOR OP/ED): Performed by: INTERNAL MEDICINE

## 2024-05-15 PROCEDURE — 2500000005 HC RX 250 GENERAL PHARMACY W/O HCPCS

## 2024-05-15 RX ORDER — BUMETANIDE 0.25 MG/ML
3 INJECTION INTRAMUSCULAR; INTRAVENOUS ONCE
Status: COMPLETED | OUTPATIENT
Start: 2024-05-15 | End: 2024-05-15

## 2024-05-15 RX ORDER — HEPARIN SODIUM 5000 [USP'U]/ML
5000 INJECTION, SOLUTION INTRAVENOUS; SUBCUTANEOUS EVERY 8 HOURS SCHEDULED
Status: CANCELLED | OUTPATIENT
Start: 2024-05-15

## 2024-05-15 RX ADMIN — TACROLIMUS 2 MG: 1 CAPSULE ORAL at 18:45

## 2024-05-15 RX ADMIN — CARVEDILOL 37.5 MG: 12.5 TABLET, FILM COATED ORAL at 10:26

## 2024-05-15 RX ADMIN — CARVEDILOL 37.5 MG: 12.5 TABLET, FILM COATED ORAL at 20:35

## 2024-05-15 RX ADMIN — TACROLIMUS: 1 OINTMENT TOPICAL at 21:00

## 2024-05-15 RX ADMIN — CINACALCET 30 MG: 30 TABLET, FILM COATED ORAL at 10:26

## 2024-05-15 RX ADMIN — ACETAMINOPHEN 975 MG: 325 TABLET ORAL at 20:38

## 2024-05-15 RX ADMIN — TRAMADOL HYDROCHLORIDE 50 MG: 50 TABLET, COATED ORAL at 20:35

## 2024-05-15 RX ADMIN — HYDRALAZINE HYDROCHLORIDE 100 MG: 25 TABLET ORAL at 13:53

## 2024-05-15 RX ADMIN — PANTOPRAZOLE SODIUM 40 MG: 40 TABLET, DELAYED RELEASE ORAL at 10:26

## 2024-05-15 RX ADMIN — HYDRALAZINE HYDROCHLORIDE 100 MG: 25 TABLET ORAL at 20:35

## 2024-05-15 RX ADMIN — Medication 1 TABLET: at 09:00

## 2024-05-15 RX ADMIN — LIDOCAINE 1 PATCH: 4 PATCH TOPICAL at 10:25

## 2024-05-15 RX ADMIN — PIPERACILLIN SODIUM AND TAZOBACTAM SODIUM 2.25 G: 2; .25 INJECTION, SOLUTION INTRAVENOUS at 22:08

## 2024-05-15 RX ADMIN — METOCLOPRAMIDE 5 MG: 10 TABLET ORAL at 10:26

## 2024-05-15 RX ADMIN — HEPARIN SODIUM 5000 UNITS: 5000 INJECTION INTRAVENOUS; SUBCUTANEOUS at 05:34

## 2024-05-15 RX ADMIN — HYDRALAZINE HYDROCHLORIDE 100 MG: 25 TABLET ORAL at 05:34

## 2024-05-15 RX ADMIN — CALCITRIOL CAPSULES 0.25 MCG 0.25 MCG: 0.25 CAPSULE ORAL at 10:26

## 2024-05-15 RX ADMIN — TACROLIMUS: 1 OINTMENT TOPICAL at 09:00

## 2024-05-15 RX ADMIN — PRAVASTATIN SODIUM 10 MG: 20 TABLET ORAL at 20:35

## 2024-05-15 RX ADMIN — TACROLIMUS 1.5 MG: 0.5 CAPSULE ORAL at 05:35

## 2024-05-15 RX ADMIN — INSULIN GLARGINE 8 UNITS: 100 INJECTION, SOLUTION SUBCUTANEOUS at 13:54

## 2024-05-15 RX ADMIN — NIFEDIPINE 60 MG: 60 TABLET, FILM COATED, EXTENDED RELEASE ORAL at 20:35

## 2024-05-15 RX ADMIN — PIPERACILLIN SODIUM AND TAZOBACTAM SODIUM 2.25 G: 2; .25 INJECTION, SOLUTION INTRAVENOUS at 05:34

## 2024-05-15 RX ADMIN — ISOSORBIDE MONONITRATE 60 MG: 60 TABLET, EXTENDED RELEASE ORAL at 10:26

## 2024-05-15 RX ADMIN — PIPERACILLIN SODIUM AND TAZOBACTAM SODIUM 2.25 G: 2; .25 INJECTION, SOLUTION INTRAVENOUS at 13:53

## 2024-05-15 RX ADMIN — BUMETANIDE 3 MG: 0.25 INJECTION INTRAMUSCULAR; INTRAVENOUS at 20:35

## 2024-05-15 RX ADMIN — METOCLOPRAMIDE 5 MG: 10 TABLET ORAL at 20:35

## 2024-05-15 RX ADMIN — PREDNISONE 5 MG: 5 TABLET ORAL at 10:26

## 2024-05-15 RX ADMIN — NIFEDIPINE 60 MG: 60 TABLET, FILM COATED, EXTENDED RELEASE ORAL at 10:26

## 2024-05-15 ASSESSMENT — COGNITIVE AND FUNCTIONAL STATUS - GENERAL
TURNING FROM BACK TO SIDE WHILE IN FLAT BAD: A LITTLE
MOBILITY SCORE: 18
WALKING IN HOSPITAL ROOM: A LITTLE
MOBILITY SCORE: 19
MOVING TO AND FROM BED TO CHAIR: A LITTLE
CLIMB 3 TO 5 STEPS WITH RAILING: A LOT
STANDING UP FROM CHAIR USING ARMS: A LITTLE
DAILY ACTIVITIY SCORE: 24
CLIMB 3 TO 5 STEPS WITH RAILING: A LOT
MOVING TO AND FROM BED TO CHAIR: A LITTLE
STANDING UP FROM CHAIR USING ARMS: A LITTLE
WALKING IN HOSPITAL ROOM: A LITTLE

## 2024-05-15 ASSESSMENT — PAIN - FUNCTIONAL ASSESSMENT
PAIN_FUNCTIONAL_ASSESSMENT: 0-10

## 2024-05-15 ASSESSMENT — PAIN SCALES - GENERAL
PAINLEVEL_OUTOF10: 10 - WORST POSSIBLE PAIN
PAINLEVEL_OUTOF10: 10 - WORST POSSIBLE PAIN

## 2024-05-15 ASSESSMENT — ACTIVITIES OF DAILY LIVING (ADL): ADLS_ADDRESSED: YES

## 2024-05-15 ASSESSMENT — PAIN DESCRIPTION - DESCRIPTORS
DESCRIPTORS: ACHING;DISCOMFORT
DESCRIPTORS: ACHING;DISCOMFORT

## 2024-05-15 NOTE — PROGRESS NOTES
Manolo Bashir is a 68 y.o. male on day 4 of admission presenting with Shortness of breath.      Subjective   5/15: Patient seen resting in bed. His friend at bedside. Pt has some right arm swelling. He says he doesn't feel much better after two dialysis sessions. On room air - breathing still an issue for him.        Objective          Vitals 24HR  Heart Rate:  [62-71]   Temp:  [36.6 °C (97.9 °F)-36.9 °C (98.4 °F)]   Resp:  [16-18]   BP: (118-155)/(52-62)   Weight:  [75.7 kg (166 lb 12.8 oz)]   SpO2:  [97 %-99 %]     Intake/Output last 3 Shifts:  No intake or output data in the 24 hours ending 05/15/24 1507      Physical Exam  General appearance: no acute distress  Eyes: non-icteric  Skin: no apparent rash  Heart: rhythm regular  Lungs: bibasilar crackles, on room air  Abdomen: soft, nt/nd  Extremities: trace leg edema bilat, RUE swelling  : no Cedeño  Neuro: follows commands, more alert than prior  ACCESS: LUE AVG - palpable thrill, audible bruit      Relevant Results    Current Facility-Administered Medications:     acetaminophen (Tylenol) tablet 975 mg, 975 mg, oral, q6h PRN, Amanda Rivera MD, 975 mg at 05/14/24 2035    aspirin chewable tablet 81 mg, 81 mg, oral, Daily, Nadia Camarena MD, 81 mg at 05/14/24 1100    [Held by provider] azaTHIOprine (Imuran) tablet 50 mg, 50 mg, oral, Daily, Amanda Rivera MD    bumetanide (Bumex) injection 3 mg, 3 mg, intravenous, Once, Chris Weaver MD    calcitriol (Rocaltrol) capsule 0.25 mcg, 0.25 mcg, oral, Daily, Amanda Rivera MD, 0.25 mcg at 05/15/24 1026    carvedilol (Coreg) tablet 37.5 mg, 37.5 mg, oral, BID, Amanda Rivera MD, 37.5 mg at 05/15/24 1026    cinacalcet (Sensipar) tablet 30 mg, 30 mg, oral, Daily, Amanda Rivera MD, 30 mg at 05/15/24 1026    epoetin aida (Epogen,Procrit) injection 10,000 Units, 150 Units/kg, subcutaneous, Weekly, Amanda Rivera MD, 10,000 Units at 05/13/24 1642    glucagon  (Glucagen) injection 1 mg, 1 mg, intramuscular, q15 min PRN, Amanda Rivera MD    glucagon (Glucagen) injection 1 mg, 1 mg, intramuscular, q15 min PRN, Amanda Rivera MD    [Held by provider] heparin (porcine) injection 5,000 Units, 5,000 Units, subcutaneous, q8h ZOË, Nadia Camarena MD, 5,000 Units at 05/15/24 0534    hydrALAZINE (Apresoline) tablet 100 mg, 100 mg, oral, q8h, Amanda Rivera MD, 100 mg at 05/15/24 1353    insulin glargine (Lantus) injection 8 Units, 8 Units, subcutaneous, q24h, Amanda Rivera MD, 8 Units at 05/15/24 1354    insulin lispro (HumaLOG) injection 0-5 Units, 0-5 Units, subcutaneous, TID, Nadia Camarena MD, 5 Units at 05/12/24 2018    isosorbide mononitrate ER (Imdur) 24 hr tablet 60 mg, 60 mg, oral, Daily, Amanda Rivera MD, 60 mg at 05/15/24 1026    lidocaine 4 % patch 1 patch, 1 patch, transdermal, Daily, Nadia Camarena MD, 1 patch at 05/15/24 1025    metoclopramide (Reglan) tablet 5 mg, 5 mg, oral, BID, Amanda Rivera MD, 5 mg at 05/15/24 1026    multivitamin with minerals 1 tablet, 1 tablet, oral, Daily, Amanda Rivera MD, 1 tablet at 05/15/24 0900    NIFEdipine ER (Adalat CC) 24 hr tablet 60 mg, 60 mg, oral, BID, Amanda Rivera MD, 60 mg at 05/15/24 1026    ondansetron (Zofran) injection 4 mg, 4 mg, intravenous, q8h PRN, Amanda Rivera MD    ondansetron (Zofran) tablet 4 mg, 4 mg, oral, q8h PRN, Amanda Rivera MD, 4 mg at 05/11/24 1215    oxyCODONE (Roxicodone) immediate release tablet 5 mg, 5 mg, oral, q6h PRN, Amanda Rivera MD, 5 mg at 05/14/24 1734    oxygen (O2) therapy, , inhalation, Continuous PRN - O2/gases, Daniel Peña MD, 4 L/min at 05/14/24 1105    pantoprazole (ProtoNix) EC tablet 40 mg, 40 mg, oral, Daily before breakfast, Amanda Rivera MD, 40 mg at 05/15/24 1026    [START ON 5/17/2024] permethrin (Elimite) 5 % cream, , Topical, Once, Amanda WILLARD  Lizbeth Rivera MD    piperacillin-tazobactam-dextrose (Zosyn) IV 2.25 g, 2.25 g, intravenous, q8h, Amanda Rivera MD, Last Rate: 100 mL/hr at 05/15/24 1353, 2.25 g at 05/15/24 1353    pravastatin (Pravachol) tablet 10 mg, 10 mg, oral, Nightly, Amanda Rivera MD, 10 mg at 05/14/24 2030    predniSONE (Deltasone) tablet 5 mg, 5 mg, oral, q AM, Amanda Rivera MD, 5 mg at 05/15/24 1026    tacrolimus (Prograf) capsule 1.5 mg, 1.5 mg, oral, q AM, 1.5 mg at 05/15/24 0535 **AND** tacrolimus (Prograf) capsule 2 mg, 2 mg, oral, Nightly, Marion Bridges MD, 2 mg at 05/14/24 1734    tacrolimus (Protopic) 0.1 % ointment, , Topical, BID, Amanda Rivera MD, Given at 05/15/24 0900    traMADol (Ultram) tablet 50 mg, 50 mg, oral, q12h PRN, Amanda Rivera MD, 50 mg at 05/14/24 2035    vancomycin (Vancocin) pharmacy to dose - pharmacy monitoring, , miscellaneous, Daily PRN, Amanda Rivera MD    Results for orders placed or performed during the hospital encounter of 05/11/24 (from the past 24 hour(s))   Blood Culture    Specimen: Peripheral Venipuncture; Blood culture   Result Value Ref Range    Blood Culture Loaded on Instrument - Culture in progress    Blood Culture    Specimen: Peripheral Venipuncture; Blood culture   Result Value Ref Range    Blood Culture Loaded on Instrument - Culture in progress    POCT GLUCOSE   Result Value Ref Range    POCT Glucose 116 (H) 74 - 99 mg/dL   Tacrolimus level   Result Value Ref Range    Tacrolimus  6.1 <=15.0 ng/mL   Comprehensive metabolic panel   Result Value Ref Range    Glucose 176 (H) 74 - 99 mg/dL    Sodium 135 (L) 136 - 145 mmol/L    Potassium 3.6 3.5 - 5.3 mmol/L    Chloride 97 (L) 98 - 107 mmol/L    Bicarbonate 29 21 - 32 mmol/L    Anion Gap 13 10 - 20 mmol/L    Urea Nitrogen 37 (H) 6 - 23 mg/dL    Creatinine 5.73 (H) 0.50 - 1.30 mg/dL    eGFR 10 (L) >60 mL/min/1.73m*2    Calcium 8.4 (L) 8.6 - 10.6 mg/dL    Albumin 3.0 (L) 3.4 -  5.0 g/dL    Alkaline Phosphatase 43 33 - 136 U/L    Total Protein 5.9 (L) 6.4 - 8.2 g/dL    AST 12 9 - 39 U/L    Bilirubin, Total 0.3 0.0 - 1.2 mg/dL    ALT 16 10 - 52 U/L   CBC and Auto Differential   Result Value Ref Range    WBC 3.3 (L) 4.4 - 11.3 x10*3/uL    nRBC 0.0 0.0 - 0.0 /100 WBCs    RBC 2.30 (L) 4.50 - 5.90 x10*6/uL    Hemoglobin 6.8 (L) 13.5 - 17.5 g/dL    Hematocrit 20.7 (L) 41.0 - 52.0 %    MCV 90 80 - 100 fL    MCH 29.6 26.0 - 34.0 pg    MCHC 32.9 32.0 - 36.0 g/dL    RDW 13.8 11.5 - 14.5 %    Platelets 70 (L) 150 - 450 x10*3/uL    Neutrophils % 65.7 40.0 - 80.0 %    Immature Granulocytes %, Automated 3.7 (H) 0.0 - 0.9 %    Lymphocytes % 14.7 13.0 - 44.0 %    Monocytes % 12.2 2.0 - 10.0 %    Eosinophils % 3.4 0.0 - 6.0 %    Basophils % 0.3 0.0 - 2.0 %    Neutrophils Absolute 2.15 1.20 - 7.70 x10*3/uL    Immature Granulocytes Absolute, Automated 0.12 0.00 - 0.70 x10*3/uL    Lymphocytes Absolute 0.48 (L) 1.20 - 4.80 x10*3/uL    Monocytes Absolute 0.40 0.10 - 1.00 x10*3/uL    Eosinophils Absolute 0.11 0.00 - 0.70 x10*3/uL    Basophils Absolute 0.01 0.00 - 0.10 x10*3/uL   POCT GLUCOSE   Result Value Ref Range    POCT Glucose 184 (H) 74 - 99 mg/dL   Prepare RBC: 1 Units, Irradiated, Leukocytes Reduced (CMV reduced risk)   Result Value Ref Range    PRODUCT CODE D0809B01     Unit Number [id number]     Unit ABO O     Unit RH POS     XM INTEP COMP     Dispense Status XM     Blood Expiration Date May 23, 2024 23:59 EDT     PRODUCT BLOOD TYPE 5100     UNIT VOLUME 350    POCT GLUCOSE   Result Value Ref Range    POCT Glucose 167 (H) 74 - 99 mg/dL        Assessment  Mr. Bashir is a 68 y.o. male with past medical history significant for ESRD s/p 1st DDKT in 1992 and 2nd DDKT in 2013 which is failing - CKD 5. He also has history include MGUS with IgG and IgA lambda, diabetes, hypertension, prostate cancer status post radical prostatectomy, urinary incontinence, HCV s/p treatment.  Admitted on 5/11/2024 for volume  overload, pneumonia. Transplant Nephrology for advanced CKD in pt with of renal transplant.    1. Failed kidney transplant - initiated back on HD this admission for uremia  ESRD S/P Kidney transplant x 2 - DDKT 1992 then DDKT 2013  - Baseline creatinine had been 3.5 - 4  - labs notable for K 3.6  - UO not charted  - on room air, BP stable  - has LUE AVG 3/21/2024 - clear for use  - s/p first HD on 5/13 with 1L UF  - s/p second HD on 5/14 with 0.5L UF  - no HD today as per patient/family request for break from dialysis, next HD tomorrow (5/16)- will aim for more fluid removal  -  pt will need outpatient dialysis chair prior to discharge; his preference is where he went before when he was on chronic dialysis: [facility] on MWF schedule    2. Immunosuppression   - Monitor tacrolimus trough level   -Last tacrolimus level was 6.1 on 5/15  -Goal tacrolimus trough level is to be determined  - On tacrolimus 1.5mg in AM and 2mg in PM  - holding azathioprine due to pneumonia  - continue prednisone 5mg daily  - continue current immunosuppression    3. Pneumonia/Volume overload  - on room air  - s/p IV bumex 3mg x 2 on 5/12  - UO not documented  - can give lasix or bumex on non-HD days    4. Anemia and WBC   - Hb 6.8  - iron studies 5/11: iron low at 22, T sats low at 13%; ferritin high at 602  - s/p epogen 10k units on 5/13    5. Hypertension   -Goal BP < 140/90 mmHg   -continue current management     * Case was discussed with primary team.  For questions, please contact transplant nephrology page x [phone]    D/w Dr. Sariah Ivory MD  Nephrology Fellow PGY 5  Contact via secure chat

## 2024-05-15 NOTE — CARE PLAN
The patient's goals for the shift include      The clinical goals for the shift include Patient to remain hemodynamically stable throughout the shift      Problem: Pain  Goal: My pain/discomfort is manageable  Outcome: Progressing     Problem: Safety  Goal: Patient will be injury free during hospitalization  Outcome: Progressing  Goal: I will remain free of falls  Outcome: Progressing     Problem: Daily Care  Goal: Daily care needs are met  Outcome: Progressing     Problem: Psychosocial Needs  Goal: Demonstrates ability to cope with hospitalization/illness  Outcome: Progressing  Goal: Collaborate with me, my family, and caregiver to identify my specific goals  Outcome: Progressing     Problem: Pain - Adult  Goal: Verbalizes/displays adequate comfort level or baseline comfort level  Outcome: Progressing     Problem: Safety - Adult  Goal: Free from fall injury  Outcome: Progressing     Problem: Skin  Goal: Decreased wound size/increased tissue granulation at next dressing change  Outcome: Progressing  Goal: Participates in plan/prevention/treatment measures  Outcome: Progressing  Goal: Prevent/manage excess moisture  Outcome: Progressing  Goal: Prevent/minimize sheer/friction injuries  Outcome: Progressing  Goal: Promote/optimize nutrition  Outcome: Progressing  Goal: Promote skin healing  Outcome: Progressing

## 2024-05-15 NOTE — PROGRESS NOTES
Attempted to meet with pt this afternoon but pt was sound asleep in bed, decision was made not to awake pt. Plan to attempt to meet with pt another time. Care coordinator will continue to follow for discharge planning needs.    Joyce López RN  Transitional Care Coordinator/TCC  [phone]

## 2024-05-15 NOTE — PROGRESS NOTES
Social Work Note:    FLORENTINO spoke with the patient's TCC who reported that patient might be medically ready during the weekend.  PREMAW resent dialysis referral via fax and email due to not getting a response from [facility].  Patient would like to go to [facility] first shift.  PREMAW will continue to follow    RU Balderas, FLORENTINO-S

## 2024-05-15 NOTE — PROGRESS NOTES
Subjective   Manolo Bashir is a 68 y.o. male reports his breathing is doing well.  Patient does have swelling of his right forearm and hand but denies any pain or trauma to the area.  Patient did have an IV both at the antecubital and the hand that had to be removed with a new antecubital IV in place.    Objective     Exam     Vitals:    05/14/24 1419 05/14/24 1946 05/15/24 0447 05/15/24 1344   BP: 113/54 118/52 122/57 155/62   BP Location: Right arm      Patient Position: Sitting      Pulse: 70 71 62 71   Resp: (!) 28 18 16 16   Temp: 37.4 °C (99.3 °F) 36.9 °C (98.4 °F) 36.6 °C (97.9 °F) 36.7 °C (98.1 °F)   TempSrc: Temporal      SpO2: 100% 97% 97% 99%   Weight:   75.7 kg (166 lb 12.8 oz)    Height:          Intake/Output last 3 shifts:  I/O last 3 completed shifts:  In: 800 (10.6 mL/kg) [I.V.:600 (7.9 mL/kg); Other:200]  Out: 906 (12 mL/kg) [Other:906]  Weight: 75.7 kg     Physical Exam  Vitals reviewed.   Constitutional:       General: He is not in acute distress.     Appearance: Normal appearance. He is not ill-appearing, toxic-appearing or diaphoretic.      Comments: Breathing smooth and calm.  Sitting up in chair   HENT:      Head: Normocephalic and atraumatic.   Cardiovascular:      Rate and Rhythm: Normal rate and regular rhythm.      Pulses: Normal pulses.      Heart sounds: Murmur (2 out of 6 systolic murmur heard greatest over the left upper sternal border) heard.      No friction rub. No gallop.   Pulmonary:      Effort: Pulmonary effort is normal.      Breath sounds: No wheezing, rhonchi or rales.   Abdominal:      General: Bowel sounds are normal. There is no distension.      Palpations: Abdomen is soft.      Tenderness: There is no abdominal tenderness. There is no guarding or rebound.   Musculoskeletal:      Comments: Trace bilateral lower extremity edema.  Right forearm and slightly proximal to the antecubital fossa is pitting edema without any erythema or change in range of motion.  Area is not  tender.  There is no open lesions   Skin:     General: Skin is warm and dry.   Neurological:      General: No focal deficit present.      Mental Status: He is alert.      Comments: Nonfocal   Psychiatric:         Mood and Affect: Mood normal.         Behavior: Behavior normal.            Medications   aspirin, 81 mg, oral, Daily  [Held by provider] azaTHIOprine, 50 mg, oral, Daily  calcitriol, 0.25 mcg, oral, Daily  carvedilol, 37.5 mg, oral, BID  cinacalcet, 30 mg, oral, Daily  epoetin aida or biosimilar, 150 Units/kg, subcutaneous, Weekly  [Held by provider] heparin (porcine), 5,000 Units, subcutaneous, q8h ZOË  hydrALAZINE, 100 mg, oral, q8h  insulin glargine, 8 Units, subcutaneous, q24h  insulin lispro, 0-5 Units, subcutaneous, TID  isosorbide mononitrate ER, 60 mg, oral, Daily  lidocaine, 1 patch, transdermal, Daily  metoclopramide, 5 mg, oral, BID  multivitamin with minerals, 1 tablet, oral, Daily  NIFEdipine ER, 60 mg, oral, BID  pantoprazole, 40 mg, oral, Daily before breakfast  [START ON 5/17/2024] permethrin, , Topical, Once  piperacillin-tazobactam, 2.25 g, intravenous, q8h  pravastatin, 10 mg, oral, Nightly  predniSONE, 5 mg, oral, q AM  tacrolimus, 1.5 mg, oral, q AM   And  tacrolimus, 2 mg, oral, Nightly  tacrolimus, , Topical, BID       PRN medications: acetaminophen, glucagon, glucagon, ondansetron, ondansetron, oxyCODONE, oxygen, traMADol, vancomycin       Labs     All new labs reviewed:  some of the basic labs as follows -     Results from last 7 days   Lab Units 05/15/24  0546 05/14/24  0645 05/13/24  1701   WBC AUTO x10*3/uL 3.3* 3.8* 4.4   HEMOGLOBIN g/dL 6.8* 7.1* 7.0*   HEMATOCRIT % 20.7* 22.1* 22.3*   PLATELETS AUTO x10*3/uL 70* 79* 78*   NEUTROS PCT AUTO % 65.7 63.9 74.1   LYMPHS PCT AUTO % 14.7 14.5 8.8   MONOS PCT AUTO % 12.2 14.2 12.9   EOS PCT AUTO % 3.4 2.9 0.5          Results from last 72 hours   Lab Units 05/15/24  0546 05/14/24  0645 05/13/24  0947   SODIUM mmol/L 135* 137 136  "  POTASSIUM mmol/L 3.6 3.9 4.2   CHLORIDE mmol/L 97* 100 101   CO2 mmol/L 29 27 27   BUN mg/dL 37* 50* 66*   CREATININE mg/dL 5.73* 5.60* 6.00*     Results from last 72 hours   Lab Units 05/15/24  0546 05/14/24  0645 05/13/24  0947   ALK PHOS U/L 43 41 40   AST U/L 12 12 12   ALT U/L 16 21 24   BILIRUBIN TOTAL mg/dL 0.3 0.4 0.3   ALBUMIN g/dL 3.0* 3.0* 3.0*   PROTEIN TOTAL g/dL 5.9* 5.5* 5.8*     Results from last 72 hours   Lab Units 05/15/24  0546 05/14/24  0645 05/13/24  0947   GLUCOSE mg/dL 176* 129* 120*           No results found for: \"TR1\"  Lab Results   Component Value Date    URINECULTURE CANCELED 07/19/2023    BLOODCULT Loaded on Instrument - Culture in progress 05/14/2024    BLOODCULT Loaded on Instrument - Culture in progress 05/14/2024    BLOODCULT No growth at 4 days -  FINAL REPORT 10/19/2023    BLOODCULT No growth at 4 days -  FINAL REPORT 10/19/2023            Imaging   Electrocardiogram, 12-lead PRN ACS symptoms  Poor data quality, interpretation may be adversely affected  RegularÂ  Wide QRS rhythm  Left axis deviation  Nonspecific intraventricular block  Abnormal ECG  When compared with ECG of 14-MAY-2024 13:02,  Wide QRS rhythm has replaced Sinus rhythm  Confirmed by Kris Tran ([id number]) on 5/15/2024 7:54:11 AM     No results found for this or any previous visit from the past 1095 days.     Encounter Date: 05/11/24   Electrocardiogram, 12-lead PRN ACS symptoms   Result Value    Ventricular Rate 70    Atrial Rate 357    QRS Duration 150    QT Interval 412    QTC Calculation(Bazett) 444    R Axis -33    T Axis 260    QRS Count 12    Q Onset 215    T Offset 421    QTC Fredericia 433    Narrative    Poor data quality, interpretation may be adversely affected  RegularÂ  Wide QRS rhythm  Left axis deviation  Nonspecific intraventricular block  Abnormal ECG  When compared with ECG of 14-MAY-2024 13:02,  Wide QRS rhythm has replaced Sinus rhythm  Confirmed by Kris Tran ([id number]) on 5/15/2024 7:54:11 AM "        Assessment and Plan      Shortness of breath: Likely related to worsening volume overload in the setting of declining renal function.  Patient has had multiple admissions in the recent past for this same complaint.  Typically IV diuresis helps resolve his problems. Possible hap.  Patient had low-grade fever as well as increased respiratory rate after dialysis 5/14.  -Continue diuretics as guided by transplant nephrology.  Patient is initiating hemodialysis per nephrology.   This will help control volume.  Per nephrology planning Lasix on nondialysis days.  Patient has responded better to Bumex in the past  -Attempt to monitor strict ins and outs and daily weights  -Continue IV antibiotics (Zosyn and vancomycin) in setting of low-grade fever and worsening breathing despite fluid removal  -Follow-up blood cultures if able to obtain sputum cultures  -pulmonary tioleting.  Incentive spirometer ~10x/hr while awake and flutter valve therapy  -Wean O2 as tolerated  -Asking PCC/social work to obtain outpatient dialysis chair     Renal transplant: With kidney failure back on dialysis  -For now we will continue immunosuppression regimen as directed by transplant service (tacrolimus, azathioprine, and prednisone).  Will continue to hold azathioprine given fever  -Monitor tacrolimus level daily  -Continue home Sensipar and vitamin D3/calcitriol  -Follow-up transplant nephrology recommendations  -Monitor renal function panel  -hemodialysis per nephrology.  Likely to have his next session tomorrow  -Like to establish outpatient hemodialysis chair with the aid of /TCC     Cardiovascular: Blood pressure markedly elevated in the emergency room.  Currently blood pressure doing much better and within normal limits to mildly elevated  -Continue home carvedilol, hydralazine, nifedipine, and Imdur  -Ideally utilize IV labetalol as needed  -Continue statin and aspirin  -Compresses and elevation of the right arm.  No  DVT seen on ultrasound portions of arm that are visible     Pancytopenia: Iron is low mildly elevated ferritin and low percent sat.  Folate and B12 are normal.  Hemoglobin now less than 7 again  -Monitor CBC.  Transfuse 1 unit packed red blood cells  -Replete iron  -Epogen 10,000 units weekly     Diabetes mellitus: A1c from 1/24 is 7.7.  Patient presented with hypoglycemia in the setting of poor p.o. intake and vomiting with food he was taking in the setting of heavy coughing.  Patient does endorse he did continue to take his home insulin as directed despite alterations to oral intake.  Patient's blood glucoses are overall doing okay.  Patient was given 0 units of sliding scale insulin  -Continue Lantus.  We will titrate per corrective scale insulin replacement.  Can likely increase to 10 units  -Holding mealtime lispro at this time  -Continue corrective scale insulin     GI:  -Continue PPI  -Bowel care     DVT prophylaxis     Daniel Peña MD      Of note the above was done with Dragon dictation system.  Note was proofread to minimize errors.

## 2024-05-15 NOTE — PROGRESS NOTES
"Manolo Bashir is a 68 y.o. male on day 4 of admission presenting with Shortness of breath.    Subjective   Resting comfortably in bed today, alert. States back pain improved after PO Oxycodone last night. Denies HA, SOB, CP, ABD pain, N/V.        Objective     Physical Exam  Constitutional:       General: He is not in acute distress.     Appearance: He is ill-appearing.   HENT:      Head: Normocephalic and atraumatic.   Cardiovascular:      Rate and Rhythm: Normal rate and regular rhythm.   Pulmonary:      Effort: No respiratory distress.      Breath sounds: No rales.      Comments: Coarse breath sounds to all fields bilaterally  Abdominal:      General: Abdomen is flat.      Palpations: Abdomen is soft.      Tenderness: There is no abdominal tenderness.   Musculoskeletal:         General: Swelling present.      Right lower leg: No edema.      Left lower leg: No edema.      Comments: 3+ edema to RUE   Skin:     General: Skin is warm and dry.   Neurological:      General: No focal deficit present.      Mental Status: He is alert.   Psychiatric:         Mood and Affect: Mood normal.         Behavior: Behavior normal.         Last Recorded Vitals  Blood pressure 122/57, pulse 62, temperature 36.6 °C (97.9 °F), resp. rate 16, height 1.727 m (5' 8\"), weight 75.7 kg (166 lb 12.8 oz), SpO2 97%.  Intake/Output last 3 Shifts:  I/O last 3 completed shifts:  In: 800 (10.6 mL/kg) [I.V.:600 (7.9 mL/kg); Other:200]  Out: 906 (12 mL/kg) [Other:906]  Weight: 75.7 kg     Relevant Results          Scheduled medications  aspirin, 81 mg, oral, Daily  [Held by provider] azaTHIOprine, 50 mg, oral, Daily  calcitriol, 0.25 mcg, oral, Daily  carvedilol, 37.5 mg, oral, BID  cinacalcet, 30 mg, oral, Daily  epoetin aida or biosimilar, 150 Units/kg, subcutaneous, Weekly  [Held by provider] heparin (porcine), 5,000 Units, subcutaneous, q8h ZOË  hydrALAZINE, 100 mg, oral, q8h  insulin glargine, 8 Units, subcutaneous, q24h  insulin lispro, 0-5 " Units, subcutaneous, TID  isosorbide mononitrate ER, 60 mg, oral, Daily  lidocaine, 1 patch, transdermal, Daily  metoclopramide, 5 mg, oral, BID  multivitamin with minerals, 1 tablet, oral, Daily  NIFEdipine ER, 60 mg, oral, BID  pantoprazole, 40 mg, oral, Daily before breakfast  [START ON 5/17/2024] permethrin, , Topical, Once  piperacillin-tazobactam, 2.25 g, intravenous, q8h  pravastatin, 10 mg, oral, Nightly  predniSONE, 5 mg, oral, q AM  tacrolimus, 1.5 mg, oral, q AM   And  tacrolimus, 2 mg, oral, Nightly  tacrolimus, , Topical, BID      Continuous medications     PRN medications  PRN medications: acetaminophen, glucagon, glucagon, ondansetron, ondansetron, oxyCODONE, oxygen, traMADol, vancomycin  Results for orders placed or performed during the hospital encounter of 05/11/24 (from the past 24 hour(s))   Blood Culture    Specimen: Peripheral Venipuncture; Blood culture   Result Value Ref Range    Blood Culture Loaded on Instrument - Culture in progress    Blood Culture    Specimen: Peripheral Venipuncture; Blood culture   Result Value Ref Range    Blood Culture Loaded on Instrument - Culture in progress    POCT GLUCOSE   Result Value Ref Range    POCT Glucose 116 (H) 74 - 99 mg/dL   Tacrolimus level   Result Value Ref Range    Tacrolimus  6.1 <=15.0 ng/mL   Comprehensive metabolic panel   Result Value Ref Range    Glucose 176 (H) 74 - 99 mg/dL    Sodium 135 (L) 136 - 145 mmol/L    Potassium 3.6 3.5 - 5.3 mmol/L    Chloride 97 (L) 98 - 107 mmol/L    Bicarbonate 29 21 - 32 mmol/L    Anion Gap 13 10 - 20 mmol/L    Urea Nitrogen 37 (H) 6 - 23 mg/dL    Creatinine 5.73 (H) 0.50 - 1.30 mg/dL    eGFR 10 (L) >60 mL/min/1.73m*2    Calcium 8.4 (L) 8.6 - 10.6 mg/dL    Albumin 3.0 (L) 3.4 - 5.0 g/dL    Alkaline Phosphatase 43 33 - 136 U/L    Total Protein 5.9 (L) 6.4 - 8.2 g/dL    AST 12 9 - 39 U/L    Bilirubin, Total 0.3 0.0 - 1.2 mg/dL    ALT 16 10 - 52 U/L   CBC and Auto Differential   Result Value Ref Range    WBC 3.3  (L) 4.4 - 11.3 x10*3/uL    nRBC 0.0 0.0 - 0.0 /100 WBCs    RBC 2.30 (L) 4.50 - 5.90 x10*6/uL    Hemoglobin 6.8 (L) 13.5 - 17.5 g/dL    Hematocrit 20.7 (L) 41.0 - 52.0 %    MCV 90 80 - 100 fL    MCH 29.6 26.0 - 34.0 pg    MCHC 32.9 32.0 - 36.0 g/dL    RDW 13.8 11.5 - 14.5 %    Platelets 70 (L) 150 - 450 x10*3/uL    Neutrophils % 65.7 40.0 - 80.0 %    Immature Granulocytes %, Automated 3.7 (H) 0.0 - 0.9 %    Lymphocytes % 14.7 13.0 - 44.0 %    Monocytes % 12.2 2.0 - 10.0 %    Eosinophils % 3.4 0.0 - 6.0 %    Basophils % 0.3 0.0 - 2.0 %    Neutrophils Absolute 2.15 1.20 - 7.70 x10*3/uL    Immature Granulocytes Absolute, Automated 0.12 0.00 - 0.70 x10*3/uL    Lymphocytes Absolute 0.48 (L) 1.20 - 4.80 x10*3/uL    Monocytes Absolute 0.40 0.10 - 1.00 x10*3/uL    Eosinophils Absolute 0.11 0.00 - 0.70 x10*3/uL    Basophils Absolute 0.01 0.00 - 0.10 x10*3/uL   POCT GLUCOSE   Result Value Ref Range    POCT Glucose 184 (H) 74 - 99 mg/dL   POCT GLUCOSE   Result Value Ref Range    POCT Glucose 167 (H) 74 - 99 mg/dL               Assessment/Plan   Principal Problem:    Shortness of breath    #ESRD s/p renal transplant (X2)  #Hypervolemia  :: Renal transplants in 1992 and 2013  ::B/l Cr 5.5  :: Dialysis access created 3/2024 in LUE with palpable thrill   :: TTE 4/2024 EF 54%  - Transplant nephrology following   - CTAP w/ R>L pulmonary edema and R>L moderate pleural effusions with near-complete collapse of the right lower lobe.   - Crackles resolved on exam; BLE edema improved  - Continue home Nifedipine, Hydralazine, Coreg and Indur  - Consider thoracentesis if SOB does not improve with diuresis/dialysis  - Hold aziathroprine per transplant nephrology recs  - Hold Bactrim PJP PPX;  not recommended at this time    - No dialysis today; 3rd dialysis session 5/16, then TTS   - Lasix okay on non-dialysis days per nephrology PRN volume overload  - Goal tacrolimus level to be determined d/t starting HD  - Continue tacrolimus 1.5 mg AM and  2 mg PM and prednisone 5 mg daily for now per nephrology; pt will gradually be tapered down from immunosuppressants in setting of failed kidney transplant  - Hgb 6.8 5/15; transfused 1 unit pRBCs   - Continue home epoetin aida injection 10,000 units weekly   - Strict I&Os  - Daily weights      #Hospital Acquired Pneumonia  ::CXR 5/11: There are hazy and dense opacities over the right mid lung and bilateral lung bases with blunting of the costophrenic angles.   1. Bilateral pleural effusions with bibasilar atelectasis or consolidation, increased from previous radiograph on 04/03/2024. 2. Prominent interstitial and perihilar markings may reflect component of pulmonary edema.   - 5/14 Pt developed low grade fever to 100.6 and tachypnea - Continued to sat well on 2-4L O2. Later developed nausea and vomiting  - Fever resolved today  - Continue ABX (vancomycin, Zosyn)   - Continue Tylenol and Zofran PRN  - Repeat EKG unchanged from previous  - Peripheral blood cultures, pending  - Follow up sputum cultures, pending  - Respiratory viral panel negative   - Continue holding aziathoprine   - Continue incentive spirometry 10 times per hour while awake   - Wean O2 as tolerated     #RUE swelling  - Right upper extremity w/ 3+ pitting edema, nonerythematous, and nontender to palpation  - No IV medication has gone through that arm per nursing; IV was not infiltrated  - Remote h/o of AVF in RUE  - Does not appear to be cellulitis or lymphedema  - Pt occasionally found laying on R side; could be dependent   - Venous duplex RUE negative for DVT  - Wrapped RUE in ACE bandage and will elevate; CTM     #Chronic HTN  :: H/o multiple prior admissions for hypervolemia and hypertensive urgency   - Restarted home Hydralazine, Nifedipine, Coreg, and Indur  - Pt w/ episode of hypertensive urgency and flash pulmonary edema 5/11 afternoon with HA, SOB, severe ABD pain, nausea, and vomiting  - Ordered bedside EKG 5/11; not significantly changed  from previous  - VBG wnl   - CTCAP negative for aortic dissection   - CTM BP    #Possible HIT  - Platelets decreased to 70 today; gradually downtrending over past 4 days after decreasing 46% post-heparin on day of admission  - ESRD could be contributing to platelet dysfunction  - Hold SubQ heparin   - Follow up Anti-platelet factor 4 antibody  - Ordered SCDs for DVT prophylaxis      #DMII  :: A1C 7.7 3 months ago   :: Home regimen: insulin glargine 20 units daily and Humalog TID with meals: 100-199 2 units, 200-299 4 units, 300-399 6 units  - POCT glucose in 140s yesterday and 112-202 today  - Glucose 129 on CMP 5/14  - Continue basal Lantus 8 units daily (lower dose d/t recent hypoglycemia)  - CTM Q4 hour POC glucose     #H/o head lice  - No active lice on scalp exam 5/13  - No need for isolation  - Received permethrin application 5/7; next application 5/17 to complete course                 VTE ppx: SCDs  Diet: Adult Renal   PPX: Pantoprozole 40 mg  Bowel regimen: -  Code Status: Full code   Contact Number: Amalia Enriquez (friend) [phone]  Dispo: EZEKIEL Mcpherson, MS4

## 2024-05-15 NOTE — PROGRESS NOTES
Physical Therapy    Physical Therapy Evaluation    Patient Name: Manolo Bashir  MRN: [id number]  Today's Date: 5/15/2024   Time Calculation  Start Time: 0904  Stop Time: 0930  Time Calculation (min): 26 min    Assessment/Plan   PT Assessment  PT Assessment Results: Decreased strength, Impaired balance, Decreased mobility, Decreased coordination, Decreased endurance  Rehab Prognosis: Excellent  Evaluation/Treatment Tolerance: Patient tolerated treatment well  Medical Staff Made Aware: Yes  Strengths: Ability to acquire knowledge, Attitude of self, Coping skills, Premorbid level of function, Support of Caregivers  Barriers to Participation: Other (Comment) (stomach pain; still participated fully)  End of Session Communication: Bedside nurse  Assessment Comment: 67 yo male with CHF exacerbation and PNA as well as failed kidney transplant (just restarted dialysis) presents with deconditioning, need for supplemental O2 (not his baseline), deconditioning and balance deficits. Once able to do flight of stairs, anticipate safe DC home with intermittent assist and low intensity home therapy.  End of Session Patient Position: Up in chair, Alarm off, not on at start of session  IP OR SWING BED PT PLAN  Inpatient or Swing Bed: Inpatient  PT Plan  Treatment/Interventions: Bed mobility, Transfer training, Gait training, Stair training, Balance training, Neuromuscular re-education, Strengthening, Endurance training, Therapeutic exercise, Therapeutic activity, Home exercise program, Postural re-education  PT Plan: Skilled PT  PT Frequency: 4 times per week  PT Discharge Recommendations: Low intensity level of continued care, Other (comment) (if/after pt able to do flight of stairs safely (likely next visit))  Equipment Recommended upon Discharge: Other (comment) (TBD (may need home OT, shower chair, possibly WW))  PT Recommended Transfer Status: Assist x1 (CGA no device, little off balance)  PT - OK to Discharge: Yes (once able to  do stairs as noted; PT eval completed and DC recs made)      Subjective   General Visit Information:  General  Reason for Referral: Admitted 5/11 with dypsnea and weakness; dx: PNA, hypervolemia, CHF exacerbation, failed kidney transplant  Past Medical History Relevant to Rehab: head lice 3/23/24, ESRD on HD now s/p 2 time renal transplant (1992, 2013), CKD 3, IgG and IgA lambda MGUS, DM2, HTN, prostate cancer s/p radical prostatectomy, baseline urinary incontinence, HCV, anemia, OA, cataracts, COVID  Missed Visit: No  Family/Caregiver Present: No  Co-Treatment:  (N/A)  Prior to Session Communication: Bedside nurse  Patient Position Received: Bed, 4 rail up, Alarm off, not on at start of session  Preferred Learning Style: verbal  General Comment: Pt supine alert, engaged, reports stomach pain, mild balance instabiliy. Pt reports that he is not on oxygen at home.  Home Living:  Home Living  Type of Home: House  Lives With: Other (Comment) (daughter (works days))  Home Adaptive Equipment: None  Home Layout: Multi-level  Home Access: Stairs to enter with rails (6 JAYLIN with 2 rails (able to reach both); tub combo (no equip) and bed up 6+6 stairs with 1 rail)  Bathroom Shower/Tub: Tub/shower unit  Bathroom Toilet: Standard  Bathroom Equipment: None  Prior Level of Function:  Prior Function Per Pt/Caregiver Report  Level of Chaves:  (ind amb in/outdoors no device, ind stairclimbing, no falls)  ADL Assistance:  (ind dress/shower)  Vocational: On disability  Hand Dominance: Right  Prior Function Comments: not on home O2, drives  Precautions:  Precautions  Medical Precautions: Fall precautions, Oxygen therapy device and L/min (anemia, DM)  Vital Signs:  Vital Signs  Heart Rate:  (sitting post gait: /65  HR 74  O2 99% (on 3L O2 via NC))  BP Location: Right arm  BP Method: Automatic  Patient Position: Sitting    Objective   Pain:  Pain Assessment  Pain Assessment: 0-10  Pain Score:  (reports stomach discomfort,  didn't rate)  Pain Interventions: Ambulation/increased activity  Response to Interventions: relased some gas while walking, stomach still hurting a little (RN informed)  Cognition:  Cognition  Overall Cognitive Status: Within Functional Limits  Orientation Level: Oriented X4    General Assessments:                  Activity Tolerance  Endurance: Other (Comment) (intermittent rest breaks)    Sensation  Light Touch: Not tested       Postural Control  Postural Control: Impaired  Trunk Control: mild balance deficits in standing (see balance)    Static Sitting Balance  Static Sitting-Balance Support: No upper extremity supported, Feet supported  Static Sitting-Level of Assistance: Independent  Dynamic Sitting Balance  Dynamic Sitting-Balance Support: Feet supported, No upper extremity supported  Dynamic Sitting-Balance: Reaching for objects  Dynamic Sitting-Comments: independent, no LOB    Static Standing Balance  Static Standing-Balance Support: No upper extremity supported (no device)  Static Standing-Level of Assistance: Close supervision (SBA/CGA)  Static Standing-Comment/Number of Minutes: no LOB  Dynamic Standing Balance  Dynamic Standing-Balance Support: No upper extremity supported (no device)  Dynamic Standing-Balance:  (gait including turns, transfers)  Dynamic Standing-Comments: CGA, no LOB  Functional Assessments:  ADL  ADL's Addressed: Yes  ADL Comments:  (pt independently donned socks in sitting)    Bed Mobility  Bed Mobility: Yes  Bed Mobility 1  Bed Mobility 1: Supine to sitting  Level of Assistance 1: Close supervision  Bed Mobility Comments 1: HOB slightly elevated, use of rail, out on Right side    Transfers  Transfer: Yes  Transfer 1  Transfer From 1: Sit to, Stand to  Transfer to 1: Sit, Stand  Technique 1: Sit to stand, Stand to sit  Transfer Device 1:  (no device)  Transfer Level of Assistance 1: Contact guard, Minimal verbal cues  Transfers 2  Transfer From 2: Stand to  Transfer to 2: Chair with  arms  Technique 2: Stand pivot, Stand to sit  Transfer Device 2:  (no device)  Transfer Level of Assistance 2: Contact guard, Minimal verbal cues    Ambulation/Gait Training  Ambulation/Gait Training Performed: Yes  Ambulation/Gait Training 1  Surface 1: Level tile  Device 1: No device  Assistance 1: Contact guard  Quality of Gait 1:  (slightly unsteady, decreased adeline, slightly wider JASMINE)  Comments/Distance (ft) 1: 100    Stairs  Stairs: No (NT today due to stomach discomfort, fatigue (hasn't eaten recently and threw-up yesterday))  Extremity/Trunk Assessments:  RUE   RUE : Within Functional Limits  LUE   LUE: Within Functional Limits  RLE   RLE : Within Functional Limits  LLE   LLE : Within Functional Limits  Outcome Measures:  [facility] Basic Mobility  Turning from your back to your side while in a flat bed without using bedrails: None  Moving from lying on your back to sitting on the side of a flat bed without using bedrails: A little  Moving to and from bed to chair (including a wheelchair): A little  Standing up from a chair using your arms (e.g. wheelchair or bedside chair): A little  To walk in hospital room: A little  Climbing 3-5 steps with railing: A lot  Basic Mobility - Total Score: 18    Tinetti  Sitting Balance: Steady, safe  Arises: Able without using arms  Attempts to Arise: Able to arise, one attempt  Immediate Standing Balance (First 5 Seconds): Steady without walker or other support  Standing Balance: Steady but wide stance, uses cane or other support  Nudged: Staggers, grabs, catches self  Eyes Closed: Steady  Turned 360 Degrees: Steadiness: Unsteady (Grabs, staggers)  Turned 360 Degrees: Continuity of Steps: Discontinuous steps  Sitting Down: Safe, smooth motion  Balance Score: 12  Initiation of Gait: No hesitancy  Step Height: R Swing Foot: Right foot complete clears floor  Step Length: R Swing Foot: Passes left stance foot  Step Height: L Swing Foot: Left foot complete clears floor  Step  Length: L Swing Foot: Passes right stance foot  Step Symmetry: Right and left step appear equal  Step Continuity: Stopping or discontinuity between steps  Path: Mild/moderate deviation or uses walking aid  Trunk: No sway, no flexion, no use of arms, no walking aid  Walking Time: Heels apart  Gait Score: 9  Total Score: 21    Encounter Problems       Encounter Problems (Active)       General Goals       independent with HEP focusing on breathing and balance in standing (Not Progressing)       Start:  05/15/24    Expected End:  05/29/24            supine to/from sit (HOB flat, no rail) independently (Progressing)       Start:  05/15/24    Expected End:  05/29/24            sit to/from stand, bed to/from chair no device independently, no LOB, stable vitals (Progressing)       Start:  05/15/24    Expected End:  05/29/24               Mobility       ambulate 500' no device with supervision, no LOB, stable vitals (Progressing)       Start:  05/15/24    Expected End:  05/29/24            up/down 12 steps with 1 rail with supervision, rest breaks as needed, stable vitals with no LOB (Not Progressing)       Start:  05/15/24    Expected End:  05/29/24            static stand no device >7 minutes with supervision, no LOB (Progressing)       Start:  05/15/24    Expected End:  05/29/24               Pain - Adult              Education Documentation  Precautions, taught by Chasidy Gunderson PT at 5/15/2024  9:35 AM.  Learner: Patient  Readiness: Acceptance  Method: Explanation, Demonstration  Response: Needs Reinforcement, Verbalizes Understanding, Demonstrated Understanding  Comment: PT purpose/POC, vitals, needs for assist with all in-hospital mobility, benefits of ambulating iwth nursing, likely DC recs    Body Mechanics, taught by Chasidy Gunderson, PT at 5/15/2024  9:35 AM.  Learner: Patient  Readiness: Acceptance  Method: Explanation, Demonstration  Response: Needs Reinforcement, Verbalizes Understanding, Demonstrated  Understanding  Comment: PT purpose/POC, vitals, needs for assist with all in-hospital mobility, benefits of ambulating iwth nursing, likely DC recs    Mobility Training, taught by Chasidy Gunderson PT at 5/15/2024  9:35 AM.  Learner: Patient  Readiness: Acceptance  Method: Explanation, Demonstration  Response: Needs Reinforcement, Verbalizes Understanding, Demonstrated Understanding  Comment: PT purpose/POC, vitals, needs for assist with all in-hospital mobility, benefits of ambulating iwth nursing, likely DC recs    Education Comments  No comments found.

## 2024-05-16 ENCOUNTER — HOME HEALTH ADMISSION (OUTPATIENT)
Dept: HOME HEALTH SERVICES | Facility: HOME HEALTH | Age: 69
End: 2024-05-16
Payer: COMMERCIAL

## 2024-05-16 ENCOUNTER — APPOINTMENT (OUTPATIENT)
Dept: TRANSPLANT | Facility: HOSPITAL | Age: 69
End: 2024-05-16
Payer: COMMERCIAL

## 2024-05-16 ENCOUNTER — APPOINTMENT (OUTPATIENT)
Dept: DIALYSIS | Facility: HOSPITAL | Age: 69
End: 2024-05-16
Payer: COMMERCIAL

## 2024-05-16 LAB
ALBUMIN SERPL BCP-MCNC: 2.8 G/DL (ref 3.4–5)
ALP SERPL-CCNC: 38 U/L (ref 33–136)
ALT SERPL W P-5'-P-CCNC: 15 U/L (ref 10–52)
ANION GAP SERPL CALC-SCNC: 13 MMOL/L (ref 10–20)
AST SERPL W P-5'-P-CCNC: 14 U/L (ref 9–39)
BASOPHILS # BLD AUTO: 0.02 X10*3/UL (ref 0–0.1)
BASOPHILS NFR BLD AUTO: 0.5 %
BILIRUB SERPL-MCNC: 0.4 MG/DL (ref 0–1.2)
BUN SERPL-MCNC: 42 MG/DL (ref 6–23)
CALCIUM SERPL-MCNC: 8.3 MG/DL (ref 8.6–10.6)
CHLORIDE SERPL-SCNC: 97 MMOL/L (ref 98–107)
CO2 SERPL-SCNC: 30 MMOL/L (ref 21–32)
CREAT SERPL-MCNC: 6.27 MG/DL (ref 0.5–1.3)
EGFRCR SERPLBLD CKD-EPI 2021: 9 ML/MIN/1.73M*2
EOSINOPHIL # BLD AUTO: 0.1 X10*3/UL (ref 0–0.7)
EOSINOPHIL NFR BLD AUTO: 2.7 %
ERYTHROCYTE [DISTWIDTH] IN BLOOD BY AUTOMATED COUNT: 14.2 % (ref 11.5–14.5)
GLUCOSE BLD MANUAL STRIP-MCNC: 113 MG/DL (ref 74–99)
GLUCOSE BLD MANUAL STRIP-MCNC: 121 MG/DL (ref 74–99)
GLUCOSE BLD MANUAL STRIP-MCNC: 134 MG/DL (ref 74–99)
GLUCOSE BLD MANUAL STRIP-MCNC: 137 MG/DL (ref 74–99)
GLUCOSE SERPL-MCNC: 126 MG/DL (ref 74–99)
HCT VFR BLD AUTO: 24.6 % (ref 41–52)
HGB BLD-MCNC: 7.9 G/DL (ref 13.5–17.5)
IMM GRANULOCYTES # BLD AUTO: 0.15 X10*3/UL (ref 0–0.7)
IMM GRANULOCYTES NFR BLD AUTO: 4 % (ref 0–0.9)
INTERPRETATION FOR ANTI-PLATELET FACTOR 4 ANTIBODY: NEGATIVE
LYMPHOCYTES # BLD AUTO: 0.42 X10*3/UL (ref 1.2–4.8)
LYMPHOCYTES NFR BLD AUTO: 11.2 %
MCH RBC QN AUTO: 29.2 PG (ref 26–34)
MCHC RBC AUTO-ENTMCNC: 32.1 G/DL (ref 32–36)
MCV RBC AUTO: 91 FL (ref 80–100)
MONOCYTES # BLD AUTO: 0.52 X10*3/UL (ref 0.1–1)
MONOCYTES NFR BLD AUTO: 13.9 %
NEUTROPHILS # BLD AUTO: 2.54 X10*3/UL (ref 1.2–7.7)
NEUTROPHILS NFR BLD AUTO: 67.7 %
NRBC BLD-RTO: 0 /100 WBCS (ref 0–0)
PLATELET # BLD AUTO: 91 X10*3/UL (ref 150–450)
POTASSIUM SERPL-SCNC: 3.7 MMOL/L (ref 3.5–5.3)
PROT SERPL-MCNC: 5.5 G/DL (ref 6.4–8.2)
RBC # BLD AUTO: 2.71 X10*6/UL (ref 4.5–5.9)
SERUM AND PLATELET FACTOR 4: 0.12 OD UNITS
SODIUM SERPL-SCNC: 136 MMOL/L (ref 136–145)
TACROLIMUS BLD-MCNC: 16.5 NG/ML
VANCOMYCIN SERPL-MCNC: 21.3 UG/ML (ref 5–20)
WBC # BLD AUTO: 3.8 X10*3/UL (ref 4.4–11.3)

## 2024-05-16 PROCEDURE — 82947 ASSAY GLUCOSE BLOOD QUANT: CPT

## 2024-05-16 PROCEDURE — 8010000001 HC DIALYSIS - HEMODIALYSIS PER DAY

## 2024-05-16 PROCEDURE — 2500000002 HC RX 250 W HCPCS SELF ADMINISTERED DRUGS (ALT 637 FOR MEDICARE OP, ALT 636 FOR OP/ED): Performed by: STUDENT IN AN ORGANIZED HEALTH CARE EDUCATION/TRAINING PROGRAM

## 2024-05-16 PROCEDURE — 2500000001 HC RX 250 WO HCPCS SELF ADMINISTERED DRUGS (ALT 637 FOR MEDICARE OP)

## 2024-05-16 PROCEDURE — 2500000004 HC RX 250 GENERAL PHARMACY W/ HCPCS (ALT 636 FOR OP/ED): Performed by: STUDENT IN AN ORGANIZED HEALTH CARE EDUCATION/TRAINING PROGRAM

## 2024-05-16 PROCEDURE — 80197 ASSAY OF TACROLIMUS: CPT | Performed by: STUDENT IN AN ORGANIZED HEALTH CARE EDUCATION/TRAINING PROGRAM

## 2024-05-16 PROCEDURE — RXMED WILLOW AMBULATORY MEDICATION CHARGE

## 2024-05-16 PROCEDURE — 99232 SBSQ HOSP IP/OBS MODERATE 35: CPT | Performed by: INTERNAL MEDICINE

## 2024-05-16 PROCEDURE — 2500000006 HC RX 250 W HCPCS SELF ADMINISTERED DRUGS (ALT 637 FOR ALL PAYERS): Performed by: STUDENT IN AN ORGANIZED HEALTH CARE EDUCATION/TRAINING PROGRAM

## 2024-05-16 PROCEDURE — 80202 ASSAY OF VANCOMYCIN: CPT | Performed by: PATHOLOGY

## 2024-05-16 PROCEDURE — 97116 GAIT TRAINING THERAPY: CPT | Mod: GP | Performed by: PHYSICAL THERAPIST

## 2024-05-16 PROCEDURE — 80053 COMPREHEN METABOLIC PANEL: CPT | Performed by: STUDENT IN AN ORGANIZED HEALTH CARE EDUCATION/TRAINING PROGRAM

## 2024-05-16 PROCEDURE — 2500000005 HC RX 250 GENERAL PHARMACY W/O HCPCS

## 2024-05-16 PROCEDURE — 2500000004 HC RX 250 GENERAL PHARMACY W/ HCPCS (ALT 636 FOR OP/ED): Performed by: INTERNAL MEDICINE

## 2024-05-16 PROCEDURE — 85025 COMPLETE CBC W/AUTO DIFF WBC: CPT | Performed by: STUDENT IN AN ORGANIZED HEALTH CARE EDUCATION/TRAINING PROGRAM

## 2024-05-16 PROCEDURE — 99231 SBSQ HOSP IP/OBS SF/LOW 25: CPT | Performed by: STUDENT IN AN ORGANIZED HEALTH CARE EDUCATION/TRAINING PROGRAM

## 2024-05-16 PROCEDURE — 2500000001 HC RX 250 WO HCPCS SELF ADMINISTERED DRUGS (ALT 637 FOR MEDICARE OP): Performed by: STUDENT IN AN ORGANIZED HEALTH CARE EDUCATION/TRAINING PROGRAM

## 2024-05-16 PROCEDURE — 36415 COLL VENOUS BLD VENIPUNCTURE: CPT | Performed by: STUDENT IN AN ORGANIZED HEALTH CARE EDUCATION/TRAINING PROGRAM

## 2024-05-16 PROCEDURE — 1210000001 HC SEMI-PRIVATE ROOM DAILY

## 2024-05-16 RX ORDER — SULFAMETHOXAZOLE AND TRIMETHOPRIM 800; 160 MG/1; MG/1
160 TABLET ORAL EVERY 12 HOURS SCHEDULED
Status: DISCONTINUED | OUTPATIENT
Start: 2024-05-16 | End: 2024-05-16

## 2024-05-16 RX ORDER — AMOXICILLIN AND CLAVULANATE POTASSIUM 500; 125 MG/1; MG/1
1 TABLET, FILM COATED ORAL EVERY 24 HOURS
Status: DISCONTINUED | OUTPATIENT
Start: 2024-05-16 | End: 2024-05-19 | Stop reason: HOSPADM

## 2024-05-16 RX ADMIN — NIFEDIPINE 60 MG: 60 TABLET, FILM COATED, EXTENDED RELEASE ORAL at 22:16

## 2024-05-16 RX ADMIN — INSULIN GLARGINE 8 UNITS: 100 INJECTION, SOLUTION SUBCUTANEOUS at 10:59

## 2024-05-16 RX ADMIN — TACROLIMUS: 1 OINTMENT TOPICAL at 21:00

## 2024-05-16 RX ADMIN — TACROLIMUS: 1 OINTMENT TOPICAL at 11:07

## 2024-05-16 RX ADMIN — PANTOPRAZOLE SODIUM 40 MG: 40 TABLET, DELAYED RELEASE ORAL at 06:26

## 2024-05-16 RX ADMIN — OXYCODONE HYDROCHLORIDE 5 MG: 5 TABLET ORAL at 05:54

## 2024-05-16 RX ADMIN — METOCLOPRAMIDE 5 MG: 10 TABLET ORAL at 10:58

## 2024-05-16 RX ADMIN — CARVEDILOL 37.5 MG: 12.5 TABLET, FILM COATED ORAL at 10:58

## 2024-05-16 RX ADMIN — OXYCODONE HYDROCHLORIDE 5 MG: 5 TABLET ORAL at 13:23

## 2024-05-16 RX ADMIN — METOCLOPRAMIDE 5 MG: 10 TABLET ORAL at 22:16

## 2024-05-16 RX ADMIN — ACETAMINOPHEN 975 MG: 325 TABLET ORAL at 05:54

## 2024-05-16 RX ADMIN — PREDNISONE 5 MG: 5 TABLET ORAL at 10:58

## 2024-05-16 RX ADMIN — AMOXICILLIN AND CLAVULANATE POTASSIUM 1 TABLET: 500; 125 TABLET, FILM COATED ORAL at 11:02

## 2024-05-16 RX ADMIN — Medication 1 TABLET: at 10:58

## 2024-05-16 RX ADMIN — TACROLIMUS 1.5 MG: 0.5 CAPSULE ORAL at 05:53

## 2024-05-16 RX ADMIN — ASPIRIN 81 MG 81 MG: 81 TABLET ORAL at 10:58

## 2024-05-16 RX ADMIN — CARVEDILOL 37.5 MG: 12.5 TABLET, FILM COATED ORAL at 22:16

## 2024-05-16 RX ADMIN — TACROLIMUS 2 MG: 1 CAPSULE ORAL at 18:43

## 2024-05-16 RX ADMIN — HYDRALAZINE HYDROCHLORIDE 100 MG: 25 TABLET ORAL at 05:53

## 2024-05-16 RX ADMIN — PRAVASTATIN SODIUM 10 MG: 20 TABLET ORAL at 22:16

## 2024-05-16 RX ADMIN — OXYCODONE HYDROCHLORIDE 5 MG: 5 TABLET ORAL at 22:20

## 2024-05-16 RX ADMIN — LIDOCAINE 1 PATCH: 4 PATCH TOPICAL at 10:57

## 2024-05-16 RX ADMIN — ISOSORBIDE MONONITRATE 60 MG: 60 TABLET, EXTENDED RELEASE ORAL at 10:58

## 2024-05-16 RX ADMIN — NIFEDIPINE 60 MG: 60 TABLET, FILM COATED, EXTENDED RELEASE ORAL at 10:58

## 2024-05-16 RX ADMIN — TRAMADOL HYDROCHLORIDE 50 MG: 50 TABLET, COATED ORAL at 16:29

## 2024-05-16 RX ADMIN — PIPERACILLIN SODIUM AND TAZOBACTAM SODIUM 2.25 G: 2; .25 INJECTION, SOLUTION INTRAVENOUS at 05:53

## 2024-05-16 RX ADMIN — HYDRALAZINE HYDROCHLORIDE 100 MG: 25 TABLET ORAL at 13:24

## 2024-05-16 RX ADMIN — CALCITRIOL CAPSULES 0.25 MCG 0.25 MCG: 0.25 CAPSULE ORAL at 10:58

## 2024-05-16 RX ADMIN — ACETAMINOPHEN 975 MG: 325 TABLET ORAL at 22:16

## 2024-05-16 RX ADMIN — HYDRALAZINE HYDROCHLORIDE 100 MG: 25 TABLET ORAL at 22:16

## 2024-05-16 RX ADMIN — CINACALCET 30 MG: 30 TABLET, FILM COATED ORAL at 10:58

## 2024-05-16 ASSESSMENT — PAIN SCALES - GENERAL
PAINLEVEL_OUTOF10: 10 - WORST POSSIBLE PAIN
PAINLEVEL_OUTOF10: 0 - NO PAIN

## 2024-05-16 ASSESSMENT — COGNITIVE AND FUNCTIONAL STATUS - GENERAL
MOBILITY SCORE: 24
MOVING TO AND FROM BED TO CHAIR: A LITTLE
CLIMB 3 TO 5 STEPS WITH RAILING: A LITTLE
DAILY ACTIVITIY SCORE: 24
STANDING UP FROM CHAIR USING ARMS: A LITTLE
WALKING IN HOSPITAL ROOM: A LITTLE
TURNING FROM BACK TO SIDE WHILE IN FLAT BAD: A LITTLE
MOBILITY SCORE: 20
WALKING IN HOSPITAL ROOM: A LITTLE
DAILY ACTIVITIY SCORE: 24
MOBILITY SCORE: 19
STANDING UP FROM CHAIR USING ARMS: A LITTLE
CLIMB 3 TO 5 STEPS WITH RAILING: A LITTLE
MOVING TO AND FROM BED TO CHAIR: A LITTLE

## 2024-05-16 ASSESSMENT — PAIN - FUNCTIONAL ASSESSMENT
PAIN_FUNCTIONAL_ASSESSMENT: 0-10

## 2024-05-16 ASSESSMENT — PAIN DESCRIPTION - DESCRIPTORS
DESCRIPTORS: ACHING;DISCOMFORT
DESCRIPTORS: ACHING;DISCOMFORT

## 2024-05-16 ASSESSMENT — PAIN DESCRIPTION - ORIENTATION
ORIENTATION: RIGHT;UPPER
ORIENTATION: RIGHT

## 2024-05-16 ASSESSMENT — PAIN DESCRIPTION - LOCATION
LOCATION: BACK
LOCATION: BACK

## 2024-05-16 ASSESSMENT — ACTIVITIES OF DAILY LIVING (ADL): LACK_OF_TRANSPORTATION: NO

## 2024-05-16 NOTE — PROGRESS NOTES
Physical Therapy    Physical Therapy Treatment    Patient Name: Manolo Bashir  MRN: [id number]  Today's Date: 5/16/2024  Time Calculation  Start Time: 1313  Stop Time: 1336  Time Calculation (min): 23 min    Assessment/Plan   PT Assessment  PT Assessment Results: Decreased strength, Impaired balance, Decreased mobility, Decreased coordination, Decreased endurance, Pain  Rehab Prognosis: Excellent  Evaluation/Treatment Tolerance: Patient tolerated treatment well  Medical Staff Made Aware: Yes (RN present and aware)  Strengths: Ability to acquire knowledge, Attitude of self, Support of Caregivers, Premorbid level of function  Barriers to Participation: Other (Comment) (none)  End of Session Communication: Bedside nurse  Assessment Comment: 69 yo male with CHF exacerbation and PNA as well as failed kidney transplant (just restarted dialysis) presents with a cough, Right posterior flank pain, deconditioning,and mild balance deficits. Recommend DC home with intermittent assist and low intensity home therapy.  End of Session Patient Position: Up in chair, Alarm off, not on at start of session  PT Plan  Inpatient/Swing Bed or Outpatient: Inpatient  PT Plan  Treatment/Interventions: Bed mobility, Transfer training, Gait training, Stair training, Balance training, Neuromuscular re-education, Strengthening, Endurance training, Therapeutic exercise, Therapeutic activity, Home exercise program, Postural re-education  PT Plan: Skilled PT  PT Frequency: 3 times per week  PT Discharge Recommendations: Low intensity level of continued care  Equipment Recommended upon Discharge: Other (comment) (shower chair; already issued)  PT Recommended Transfer Status: Assist x1 (no device, SBA/supervision, monitor O2 sats)  PT - OK to Discharge: Yes (PT eval completed & DC recs made)      General Visit Information:   PT  Visit  PT Received On: 05/16/24  General  Reason for Referral: Admitted 5/11 with dypsnea and weakness; dx: PNA,  hypervolemia, CHF exacerbation, failed kidney transplant  Past Medical History Relevant to Rehab: head lice 3/23/24, ESRD on HD now s/p 2 time renal transplant (1992, 2013), CKD 3, IgG and IgA lambda MGUS, DM2, HTN, prostate cancer s/p radical prostatectomy, baseline urinary incontinence, HCV, anemia, OA, cataracts, COVID  Missed Visit: No  Family/Caregiver Present: Yes  Caregiver Feedback: Significant other present and engaged  Co-Treatment:  (N/A)  Prior to Session Communication: Bedside nurse  Patient Position Received: Bed, 2 rail up, Alarm off, not on at start of session  Preferred Learning Style: verbal  General Comment: Pt supine alert, has significant Right lateral flank pain posteriorly and cough; RN aware and gave meds, PT put hot packs on end of session. Rn removed O2 and pt ambulated/did stairs while sats intermittently documented as noted.    Subjective   Precautions:  Precautions  Medical Precautions: Fall precautions (monitor vitals, anemia, DM)  Vital Signs:  Vital Signs  Heart Rate:  (sitting pre: /70  HR 67  O2 96%; standing pre walk: HR 68  O2 97%; standing after 5 steps: HR 86  O2 99%; sitting post: HR 68  O2  94%)    Objective   Pain:  Pain Assessment  Pain Assessment: 0-10  Pain Score:  (10/10 Right posterior back/lateral ribcage pain)  Pain Interventions: Heat applied, Ambulation/increased activity, Therapeutic presence, Rest, Repositioned  Cognition:  Cognition  Overall Cognitive Status: Within Functional Limits  Orientation Level: Oriented X4  Coordination:     Postural Control:  Postural Control  Postural Control: Impaired  Trunk Control: mild balance deficits with dynamic gait, no LOB  Static Sitting Balance  Static Sitting-Balance Support: No upper extremity supported, Feet supported  Static Sitting-Level of Assistance: Independent  Dynamic Sitting Balance  Dynamic Sitting-Balance Support: No upper extremity supported, Feet supported  Dynamic Sitting-Balance: Reaching for  objects  Dynamic Sitting-Comments: modified independent, no LOB, good safety awareness  Static Standing Balance  Static Standing-Balance Support: No upper extremity supported  Static Standing-Level of Assistance: Close supervision (no device)  Static Standing-Comment/Number of Minutes: no LOB  Dynamic Standing Balance  Dynamic Standing-Balance Support: No upper extremity supported (no device)  Dynamic Standing-Balance:  (transfers, gait including turns, stairclimbing)  Dynamic Standing-Comments: SBA, no LOB    Activity Tolerance:  Activity Tolerance  Endurance: Endurance does not limit participation in activity  Treatments:  Bed Mobility  Bed Mobility: Yes  Bed Mobility 1  Bed Mobility 1: Supine to sitting  Level of Assistance 1: Close supervision  Bed Mobility Comments 1: HOB flat, use of rail    Ambulation/Gait Training  Ambulation/Gait Training Performed: Yes  Ambulation/Gait Training 1  Surface 1: Level tile  Device 1: No device  Assistance 1: Close supervision (SBA, no LOB)  Quality of Gait 1:  (mildly decreased adeline and smaller steps bilaterally (may be related to flank pain))  Comments/Distance (ft) 1: 150  Transfers  Transfer: Yes  Transfer 1  Transfer From 1: Sit to, Stand to  Transfer to 1: Sit, Stand  Technique 1: Sit to stand, Stand to sit  Transfer Device 1:  (no device)  Transfer Level of Assistance 1: Close supervision  Transfers 2  Transfer From 2: Stand to  Transfer to 2: Chair with arms  Technique 2: Stand pivot, Stand to sit  Transfer Device 2:  (no device)  Transfer Level of Assistance 2: Close supervision    Stairs  Stairs: Yes (up/down 5 steps (x 2) with 1 rail (quick rest in between to take pulse ox) reciprocally with SBA/supervision, no LOB, stable vitals)    Outcome Measures:  [facility] Basic Mobility  Turning from your back to your side while in a flat bed without using bedrails: None  Moving from lying on your back to sitting on the side of a flat bed without using bedrails: None  Moving to  and from bed to chair (including a wheelchair): A little  Standing up from a chair using your arms (e.g. wheelchair or bedside chair): A little  To walk in hospital room: A little  Climbing 3-5 steps with railing: A little  Basic Mobility - Total Score: 20    Tinetti  Sitting Balance: Steady, safe  Arises: Able, uses arms to help  Attempts to Arise: Able to arise, one attempt  Immediate Standing Balance (First 5 Seconds): Steady without walker or other support  Standing Balance: Narrow stance without support  Nudged: Steady without walker or other support  Eyes Closed: Steady  Turned 360 Degrees: Steadiness: Steady  Turned 360 Degrees: Continuity of Steps: Continuous  Sitting Down: Safe, smooth motion  Balance Score: 15  Initiation of Gait: No hesitancy  Step Height: R Swing Foot: Right foot complete clears floor  Step Length: R Swing Foot: Passes left stance foot  Step Height: L Swing Foot: Left foot complete clears floor  Step Length: L Swing Foot: Passes right stance foot  Step Symmetry: Right and left step appear equal  Step Continuity: Steps appear continuous  Path: Straight without walking aid  Trunk: No sway, no flexion, no use of arms, no walking aid  Walking Time: Heels almost touching while walking  Gait Score: 12  Total Score: 27    Education Documentation  Precautions, taught by Chasidy Gunderson PT at 5/16/2024  1:42 PM.  Learner: Significant Other, Patient  Readiness: Acceptance  Method: Explanation, Demonstration  Response: Demonstrated Understanding, Verbalizes Understanding, Needs Reinforcement  Comment: PT POC, vitals, progress, take breaks during walking and stairclimbing for breathing, DC recs    Body Mechanics, taught by Chasidy Gunderson PT at 5/16/2024  1:42 PM.  Learner: Significant Other, Patient  Readiness: Acceptance  Method: Explanation, Demonstration  Response: Demonstrated Understanding, Verbalizes Understanding, Needs Reinforcement  Comment: PT POC, vitals, progress, take breaks during  walking and stairclimbing for breathing, DC recs    Mobility Training, taught by Chasidy Gunderson, PT at 5/16/2024  1:42 PM.  Learner: Significant Other, Patient  Readiness: Acceptance  Method: Explanation, Demonstration  Response: Demonstrated Understanding, Verbalizes Understanding, Needs Reinforcement  Comment: PT POC, vitals, progress, take breaks during walking and stairclimbing for breathing, DC recs    Education Comments  No comments found.             Encounter Problems       Encounter Problems (Active)       General Goals       independent with HEP focusing on breathing and balance in standing (Not Progressing)       Start:  05/15/24    Expected End:  05/29/24            supine to/from sit (HOB flat, no rail) independently (Progressing)       Start:  05/15/24    Expected End:  05/29/24            sit to/from stand, bed to/from chair no device independently, no LOB, stable vitals (Progressing)       Start:  05/15/24    Expected End:  05/29/24               Mobility       ambulate 500' no device with supervision, no LOB, stable vitals (Progressing)       Start:  05/15/24    Expected End:  05/29/24            up/down 12 steps with 1 rail with supervision, rest breaks as needed, stable vitals with no LOB (Progressing)       Start:  05/15/24    Expected End:  05/29/24            static stand no device >7 minutes with supervision, no LOB (Progressing)       Start:  05/15/24    Expected End:  05/29/24               Pain - Adult

## 2024-05-16 NOTE — PROGRESS NOTES
Subjective   Manolo Bashir is a 68 y.o. male reports his breathing is doing well.  Patient does have swelling of his right forearm and hand but denies any pain or trauma to the area and he feels that is improving.      Objective     Exam     Vitals:    05/16/24 0434 05/16/24 0710 05/16/24 1130 05/16/24 1145   BP: 147/63      Pulse: 70 72     Resp: 18      Temp: 36.6 °C (97.9 °F) 36.7 °C (98.1 °F)     TempSrc:  Temporal     SpO2: 98%  93% 95%   Weight:       Height:          Intake/Output last 3 shifts:  I/O last 3 completed shifts:  In: 677 (8.9 mL/kg) [P.O.:240; Blood:287; IV Piggyback:150]  Out: - (0 mL/kg)   Weight: 75.7 kg     Physical Exam  Vitals reviewed.   Constitutional:       General: He is not in acute distress.     Appearance: Normal appearance. He is not ill-appearing, toxic-appearing or diaphoretic.      Comments: Breathing smooth and calm.  Patient in bed on dialysis   HENT:      Head: Normocephalic and atraumatic.   Cardiovascular:      Rate and Rhythm: Normal rate and regular rhythm.      Pulses: Normal pulses.      Heart sounds: Murmur (2 out of 6 systolic murmur heard greatest over the left upper sternal border) heard.      No friction rub. No gallop.   Pulmonary:      Effort: Pulmonary effort is normal.      Breath sounds: No wheezing, rhonchi or rales.   Abdominal:      General: Bowel sounds are normal. There is no distension.      Palpations: Abdomen is soft.      Tenderness: There is no abdominal tenderness. There is no guarding or rebound.   Musculoskeletal:      Comments: Trace bilateral lower extremity edema.  Right forearm and slightly proximal to the antecubital fossa is pitting edema without any erythema or change in range of motion.  Area is not tender.  There is no open lesions.  Overall area appears to be stable to slightly improved   Skin:     General: Skin is warm and dry.   Neurological:      General: No focal deficit present.      Mental Status: He is alert.      Comments:  Nonfocal   Psychiatric:         Mood and Affect: Mood normal.         Behavior: Behavior normal.            Medications   amoxicillin-pot clavulanate, 1 tablet, oral, q24h  aspirin, 81 mg, oral, Daily  [Held by provider] azaTHIOprine, 50 mg, oral, Daily  calcitriol, 0.25 mcg, oral, Daily  carvedilol, 37.5 mg, oral, BID  cinacalcet, 30 mg, oral, Daily  epoetin aida or biosimilar, 150 Units/kg, subcutaneous, Weekly  [Held by provider] heparin (porcine), 5,000 Units, subcutaneous, q8h ZOË  hydrALAZINE, 100 mg, oral, q8h  insulin glargine, 8 Units, subcutaneous, q24h  insulin lispro, 0-5 Units, subcutaneous, TID  isosorbide mononitrate ER, 60 mg, oral, Daily  lidocaine, 1 patch, transdermal, Daily  metoclopramide, 5 mg, oral, BID  multivitamin with minerals, 1 tablet, oral, Daily  NIFEdipine ER, 60 mg, oral, BID  pantoprazole, 40 mg, oral, Daily before breakfast  pravastatin, 10 mg, oral, Nightly  predniSONE, 5 mg, oral, q AM  tacrolimus, 1.5 mg, oral, q AM   And  tacrolimus, 2 mg, oral, Nightly  tacrolimus, , Topical, BID       PRN medications: acetaminophen, glucagon, glucagon, ondansetron, ondansetron, oxyCODONE, oxygen, traMADol       Labs     All new labs reviewed:  some of the basic labs as follows -     Results from last 7 days   Lab Units 05/16/24  0641 05/15/24  0546 05/14/24  0645   WBC AUTO x10*3/uL 3.8* 3.3* 3.8*   HEMOGLOBIN g/dL 7.9* 6.8* 7.1*   HEMATOCRIT % 24.6* 20.7* 22.1*   PLATELETS AUTO x10*3/uL 91* 70* 79*   NEUTROS PCT AUTO % 67.7 65.7 63.9   LYMPHS PCT AUTO % 11.2 14.7 14.5   MONOS PCT AUTO % 13.9 12.2 14.2   EOS PCT AUTO % 2.7 3.4 2.9          Results from last 72 hours   Lab Units 05/16/24  0641 05/15/24  0546 05/14/24  0645   SODIUM mmol/L 136 135* 137   POTASSIUM mmol/L 3.7 3.6 3.9   CHLORIDE mmol/L 97* 97* 100   CO2 mmol/L 30 29 27   BUN mg/dL 42* 37* 50*   CREATININE mg/dL 6.27* 5.73* 5.60*     Results from last 72 hours   Lab Units 05/16/24  0641 05/15/24  0546 05/14/24  0645   ALK PHOS U/L  "38 43 41   AST U/L 14 12 12   ALT U/L 15 16 21   BILIRUBIN TOTAL mg/dL 0.4 0.3 0.4   ALBUMIN g/dL 2.8* 3.0* 3.0*   PROTEIN TOTAL g/dL 5.5* 5.9* 5.5*     Results from last 72 hours   Lab Units 05/16/24  0641 05/15/24  0546 05/14/24  0645   GLUCOSE mg/dL 126* 176* 129*           No results found for: \"TR1\"  Lab Results   Component Value Date    URINECULTURE CANCELED 07/19/2023    BLOODCULT No growth at 1 day 05/14/2024    BLOODCULT No growth at 1 day 05/14/2024    BLOODCULT No growth at 4 days -  FINAL REPORT 10/19/2023    BLOODCULT No growth at 4 days -  FINAL REPORT 10/19/2023            Imaging   Electrocardiogram, 12-lead PRN ACS symptoms  Poor data quality, interpretation may be adversely affected  RegularÂ  Wide QRS rhythm  Left axis deviation  Nonspecific intraventricular block  Abnormal ECG  When compared with ECG of 14-MAY-2024 13:02,  Wide QRS rhythm has replaced Sinus rhythm  Confirmed by Kris Tran ([id number]) on 5/15/2024 7:54:11 AM     No results found for this or any previous visit from the past 1095 days.     Encounter Date: 05/11/24   Electrocardiogram, 12-lead PRN ACS symptoms   Result Value    Ventricular Rate 70    Atrial Rate 357    QRS Duration 150    QT Interval 412    QTC Calculation(Bazett) 444    R Axis -33    T Axis 260    QRS Count 12    Q Onset 215    T Offset 421    QTC Fredericia 433    Narrative    Poor data quality, interpretation may be adversely affected  RegularÂ  Wide QRS rhythm  Left axis deviation  Nonspecific intraventricular block  Abnormal ECG  When compared with ECG of 14-MAY-2024 13:02,  Wide QRS rhythm has replaced Sinus rhythm  Confirmed by Kris Tran ([id number]) on 5/15/2024 7:54:11 AM        Assessment and Plan      Shortness of breath: Likely related to worsening volume overload in the setting of declining renal function.  Patient has had multiple admissions in the recent past for this same complaint.  Typically IV diuresis helps resolve his problems. Possible hap.  Patient " had low-grade fever as well as increased respiratory rate after dialysis 5/14.  -Continue diuretics on nondialysis days per nephrology.  Patient has responded better to Bumex in the past  -Continue HD 3 times a week.  Arrange for outpatient dialysis chair still.  Likely to go to [facility] per his preference  -Attempt to monitor strict ins and outs and daily weights  -Continue antibiotics.  Overall appears to be doing well is afebrile.  Can transition over to oral Levaquin  -Follow-up blood cultures   -pulmonary tioleting.  Incentive spirometer ~10x/hr while awake and flutter valve therapy  -Wean O2 as tolerated     Renal transplant: With kidney failure back on dialysis  -continue immunosuppression regimen as directed by transplant service (tacrolimus and prednisone).  Will continue to hold azathioprine given fever  -Monitor tacrolimus level daily  -Continue home Sensipar and vitamin D3/calcitriol  -Follow-up transplant nephrology recommendations  -Monitor renal function panel  -hemodialysis per nephrology.    -Will establish outpatient hemodialysis chair with the aid of /TCC     Cardiovascular: Blood pressure markedly elevated in the emergency room.  Currently blood pressure doing much better and within normal limits to mildly elevated  -Continue home carvedilol, hydralazine, nifedipine, and Imdur  -Continue statin and aspirin  -Compresses and elevation of the right arm.  No DVT seen on ultrasound portions of arm that are visible     Pancytopenia: Iron is low mildly elevated ferritin and low percent sat.  Folate and B12 are normal.  Hemoglobin with good incrementation status post 1 unit  -Monitor CBC.    -Replete iron  -Epogen 10,000 units weekly     Diabetes mellitus: A1c from 1/24 is 7.7.  Patient presented with hypoglycemia in the setting of poor p.o. intake and vomiting with food he was taking in the setting of heavy coughing.  Patient does endorse he did continue to take his home insulin as  directed despite alterations to oral intake.  Patient's blood glucoses are overall doing okay.  Patient was given 0 units of sliding scale insulin  -Continue Lantus.  We will titrate per corrective scale insulin replacement.  Can likely increase to 10 units  -Holding mealtime lispro at this time  -Continue corrective scale insulin     GI:  -Continue PPI  -Bowel care     DVT prophylaxis    Patient approaching discharge assuming he continues to do well on oral antibiotics and obtains a dialysis time/chair as an outpatient     Daniel Peña MD      Of note the above was done with Dragon dictation system.  Note was proofread to minimize errors.

## 2024-05-16 NOTE — PROGRESS NOTES
05/16/24 1313   Discharge Planning   Living Arrangements Friends   Support Systems Children;Friends/neighbors   Assistance Needed none   Type of Residence Private residence   Do you have animals or pets at home? No   Who is requesting discharge planning? Provider   Home or Post Acute Services In home services   Type of Home Care Services DME or oxygen;Home PT;Home OT  (shower chair and FWW)   Patient expects to be discharged to: Home with home health care services   Does the patient need discharge transport arranged? No  (friend to provide)   Financial Resource Strain   How hard is it for you to pay for the very basics like food, housing, medical care, and heating? Not hard   Housing Stability   In the last 12 months, was there a time when you were not able to pay the mortgage or rent on time? N   In the last 12 months, how many places have you lived? 1   In the last 12 months, was there a time when you did not have a steady place to sleep or slept in a shelter (including now)? N   Transportation Needs   In the past 12 months, has lack of transportation kept you from medical appointments or from getting medications? no   In the past 12 months, has lack of transportation kept you from meetings, work, or from getting things needed for daily living? No     Met with pt and introduced myself as care coordinator with Care Transitions Team for discharge planning. Friend Amalia was at bedside and assisted with some of the answer to assessment questions. Pt lives at home with daughter Medina and is independent with ADL's + iADL's. Pt stated he feels safe at home. Pt denies any falls. Pt's address, phone number, and contact information was verified. MARRY Mittal consulted for arrangement of outpatient HD chair.    Home care: none.  DME: glucometer + insulin supplies.  : none.  PCP: Elma Hutton, CNP   Last appt: Pt was supposed to have an appt yesterday but missed it due to hospitalization.  Transport to appts:  Friend Amalia provides.  Pharm: [facility] (denies issues affording/obtaining medications)    Discharge Planning: Per medical team plan for dialysis tomorrow and discharge after, providing HD chair confirmed, MARRY Mittal following for arrangement of HD chair. Team anticipate pt will be weaned off O2 prior to discharge. PT is recommending DME: shower chair, and FWW, pt is agreeable to receiving DME, no company preference. Team placed orders for shower chair and FWW which was submitted to accepting DME [facility] via [employer]. [employer] delivered shower chair to bedside, I retrieved the walker from [facility] [address] on [place] [place] [id number] and provided to pt at bedside ([facility] updated). Pt signed [facility] order form, will place in [facility] once ordering provider signature obtained on the form. Pt is agreeable to receiving home PT/OT services (PT recommending low intensity therapy) , no home care agency preference. [facility] confirmed receipt of home care referral for PT/OT and updated on anticipated discharge date for SOC. Pt/ friend Amalia voiced no additional needs at time of discharge other than information within this note. Pt/ friend provided with my business card with contact number and instructed to contact me directly regarding any questions related to discharge planning. Care coordinator will continue to follow for discharge planning needs.    Joyce López RN  Transitional Care Coordinator/TCC  [phone]

## 2024-05-16 NOTE — PROGRESS NOTES
Manolo Bashir is a 68 y.o. male on day 5 of admission presenting with Shortness of breath.      Subjective   5/16: Patient seen in dialysis unit. Just completed HD session with 2L fluid removed. Breathing is okay.        Objective          Vitals 24HR  Heart Rate:  [68-74]   Temp:  [36.3 °C (97.3 °F)-37.7 °C (99.9 °F)]   Resp:  [16-20]   BP: (115-147)/(55-70)   SpO2:  [93 %-100 %]     Intake/Output last 3 Shifts:    Intake/Output Summary (Last 24 hours) at 5/16/2024 1417  Last data filed at 5/16/2024 1037  Gross per 24 hour   Intake 2277 ml   Output 4800 ml   Net -2523 ml         Physical Exam  General appearance: no acute distress  Eyes: non-icteric  Skin: no apparent rash  Heart: rhythm regular  Lungs: bibasilar crackles, on nc  Abdomen: soft, nt/nd  Extremities: trace leg edema bilat, RUE swelling  : no Cedeño  Neuro: follows commands, more alert than prior  ACCESS: LUE AVG - palpable thrill, audible bruit      Relevant Results    Current Facility-Administered Medications:     acetaminophen (Tylenol) tablet 975 mg, 975 mg, oral, q6h PRN, Amanda Rivera MD, 975 mg at 05/16/24 0554    amoxicillin-pot clavulanate (Augmentin) 500-125 mg per tablet 1 tablet, 1 tablet, oral, q24h, Chris Weaver MD, 1 tablet at 05/16/24 1102    aspirin chewable tablet 81 mg, 81 mg, oral, Daily, Nadia Camarena MD, 81 mg at 05/16/24 1058    [Held by provider] azaTHIOprine (Imuran) tablet 50 mg, 50 mg, oral, Daily, Amanda Rivera MD    [START ON 5/17/2024] B complex-vitamin C-folic acid (Nephrocaps) capsule 1 capsule, 1 capsule, oral, Daily, Whitley Lorenzana APRN-CNP    calcitriol (Rocaltrol) capsule 0.25 mcg, 0.25 mcg, oral, Daily, Amanda Rivera MD, 0.25 mcg at 05/16/24 1058    carvedilol (Coreg) tablet 37.5 mg, 37.5 mg, oral, BID, Amanda Rivera MD, 37.5 mg at 05/16/24 1058    cinacalcet (Sensipar) tablet 30 mg, 30 mg, oral, Daily, Amanda Rivera MD, 30 mg at 05/16/24 1058    epoetin  aida (Epogen,Procrit) injection 10,000 Units, 150 Units/kg, subcutaneous, Weekly, Amanda Rivera MD, 10,000 Units at 05/13/24 1642    glucagon (Glucagen) injection 1 mg, 1 mg, intramuscular, q15 min PRN, Amanda Rivera MD    glucagon (Glucagen) injection 1 mg, 1 mg, intramuscular, q15 min PRN, Amanda Rivera MD    [Held by provider] heparin (porcine) injection 5,000 Units, 5,000 Units, subcutaneous, q8h ZOË, Nadia Camarena MD, 5,000 Units at 05/15/24 0534    hydrALAZINE (Apresoline) tablet 100 mg, 100 mg, oral, q8h, Amanda Rivera MD, 100 mg at 05/16/24 1324    insulin glargine (Lantus) injection 8 Units, 8 Units, subcutaneous, q24h, Amanda Rivera MD, 8 Units at 05/16/24 1059    insulin lispro (HumaLOG) injection 0-5 Units, 0-5 Units, subcutaneous, TID, Nadia Camarena MD, 5 Units at 05/12/24 2018    isosorbide mononitrate ER (Imdur) 24 hr tablet 60 mg, 60 mg, oral, Daily, Amanda Rivera MD, 60 mg at 05/16/24 1058    lidocaine 4 % patch 1 patch, 1 patch, transdermal, Daily, Nadia Camarena MD, 1 patch at 05/16/24 1057    metoclopramide (Reglan) tablet 5 mg, 5 mg, oral, BID, Amanda Rivera MD, 5 mg at 05/16/24 1058    NIFEdipine ER (Adalat CC) 24 hr tablet 60 mg, 60 mg, oral, BID, Amanda Rivera MD, 60 mg at 05/16/24 1058    ondansetron (Zofran) injection 4 mg, 4 mg, intravenous, q8h PRN, Amanda Rivera MD    ondansetron (Zofran) tablet 4 mg, 4 mg, oral, q8h PRN, Amanda Rivera MD, 4 mg at 05/11/24 1215    oxyCODONE (Roxicodone) immediate release tablet 5 mg, 5 mg, oral, q6h PRN, Amanda Rivera MD, 5 mg at 05/16/24 1323    oxygen (O2) therapy, , inhalation, Continuous PRN - O2/gases, Daniel Peña MD, 4 L/min at 05/14/24 1105    pantoprazole (ProtoNix) EC tablet 40 mg, 40 mg, oral, Daily before breakfast, Amanda Rivera MD, 40 mg at 05/16/24 0626    pravastatin (Pravachol) tablet 10 mg, 10  mg, oral, Nightly, Amanda Rivera MD, 10 mg at 05/15/24 2035    predniSONE (Deltasone) tablet 5 mg, 5 mg, oral, q AM, Amanda Rivera MD, 5 mg at 05/16/24 1058    tacrolimus (Prograf) capsule 1.5 mg, 1.5 mg, oral, q AM, 1.5 mg at 05/16/24 0553 **AND** tacrolimus (Prograf) capsule 2 mg, 2 mg, oral, Nightly, Marion Bridges MD, 2 mg at 05/15/24 1845    tacrolimus (Protopic) 0.1 % ointment, , Topical, BID, Amanda Rivera MD, Given at 05/16/24 1107    traMADol (Ultram) tablet 50 mg, 50 mg, oral, q12h PRN, Amanda Rivera MD, 50 mg at 05/15/24 2035    Results for orders placed or performed during the hospital encounter of 05/11/24 (from the past 24 hour(s))   POCT GLUCOSE   Result Value Ref Range    POCT Glucose 188 (H) 74 - 99 mg/dL   Tacrolimus level   Result Value Ref Range    Tacrolimus  16.5 (H) <=15.0 ng/mL   Comprehensive metabolic panel   Result Value Ref Range    Glucose 126 (H) 74 - 99 mg/dL    Sodium 136 136 - 145 mmol/L    Potassium 3.7 3.5 - 5.3 mmol/L    Chloride 97 (L) 98 - 107 mmol/L    Bicarbonate 30 21 - 32 mmol/L    Anion Gap 13 10 - 20 mmol/L    Urea Nitrogen 42 (H) 6 - 23 mg/dL    Creatinine 6.27 (H) 0.50 - 1.30 mg/dL    eGFR 9 (L) >60 mL/min/1.73m*2    Calcium 8.3 (L) 8.6 - 10.6 mg/dL    Albumin 2.8 (L) 3.4 - 5.0 g/dL    Alkaline Phosphatase 38 33 - 136 U/L    Total Protein 5.5 (L) 6.4 - 8.2 g/dL    AST 14 9 - 39 U/L    Bilirubin, Total 0.4 0.0 - 1.2 mg/dL    ALT 15 10 - 52 U/L   CBC and Auto Differential   Result Value Ref Range    WBC 3.8 (L) 4.4 - 11.3 x10*3/uL    nRBC 0.0 0.0 - 0.0 /100 WBCs    RBC 2.71 (L) 4.50 - 5.90 x10*6/uL    Hemoglobin 7.9 (L) 13.5 - 17.5 g/dL    Hematocrit 24.6 (L) 41.0 - 52.0 %    MCV 91 80 - 100 fL    MCH 29.2 26.0 - 34.0 pg    MCHC 32.1 32.0 - 36.0 g/dL    RDW 14.2 11.5 - 14.5 %    Platelets 91 (L) 150 - 450 x10*3/uL    Neutrophils % 67.7 40.0 - 80.0 %    Immature Granulocytes %, Automated 4.0 (H) 0.0 - 0.9 %    Lymphocytes % 11.2  13.0 - 44.0 %    Monocytes % 13.9 2.0 - 10.0 %    Eosinophils % 2.7 0.0 - 6.0 %    Basophils % 0.5 0.0 - 2.0 %    Neutrophils Absolute 2.54 1.20 - 7.70 x10*3/uL    Immature Granulocytes Absolute, Automated 0.15 0.00 - 0.70 x10*3/uL    Lymphocytes Absolute 0.42 (L) 1.20 - 4.80 x10*3/uL    Monocytes Absolute 0.52 0.10 - 1.00 x10*3/uL    Eosinophils Absolute 0.10 0.00 - 0.70 x10*3/uL    Basophils Absolute 0.02 0.00 - 0.10 x10*3/uL   Vancomycin   Result Value Ref Range    Vancomycin 21.3 (H) 5.0 - 20.0 ug/mL   POCT GLUCOSE   Result Value Ref Range    POCT Glucose 121 (H) 74 - 99 mg/dL   POCT GLUCOSE   Result Value Ref Range    POCT Glucose 113 (H) 74 - 99 mg/dL        Assessment  Mr. Bashir is a 68 y.o. male with past medical history significant for ESRD s/p 1st DDKT in 1992 and 2nd DDKT in 2013 which is failing - CKD 5. He also has history include MGUS with IgG and IgA lambda, diabetes, hypertension, prostate cancer status post radical prostatectomy, urinary incontinence, HCV s/p treatment.  Admitted on 5/11/2024 for volume overload, pneumonia. Transplant Nephrology for advanced CKD in pt with of renal transplant.    1. Failed kidney transplant - initiated back on HD this admission for uremia  ESRD S/P Kidney transplant x 2 - DDKT 1992 then DDKT 2013  - Baseline creatinine had been 3.5 - 4  - labs notable for K 3.6  - UO not charted  - on room air, BP stable  - has LUE AVG 3/21/2024 - clear for use  - s/p first HD on 5/13 w 1L UF and second HD on 5/14 w 0.5L UF  - s/p 3rd HD today with 2L fluid removed  - pt likely to be on MWF HD schedule at [facility]; thus next HD tomorrow (5/17)    2. Immunosuppression   -Target tacrolimus trough level 3-4; ensure tac dosed 12h apart; check tac level 30min-1h prior to AM dose  - Last tacrolimus level was 21 on 5/16 - not true trough as PM dose was 9pm and level drawn at 640am   - Decrease tacrolimus to 1mg BID; dosing/level to be further monitored on outpatient follow-up  -  holding azathioprine due to pneumonia  - continue prednisone 5mg daily    3. Pneumonia/Volume overload  - on nc  - UO not documented  - can give lasix or bumex on non-HD days    4. Anemia and WBC   - Hb 7.9  - iron studies 5/11: iron low at 22, T sats low at 13%; ferritin high at 602  - s/p epogen 10k units on 5/13    5. Hypertension   -Goal BP < 140/90 mmHg   -continue current management     * Case was discussed with primary team.  For questions, please contact transplant nephrology page x [phone]    D/w Dr. Sariah Ivory MD  Nephrology Fellow PGY 5  Contact via secure chat

## 2024-05-16 NOTE — NURSING NOTE
Report from Sending RN:    Report From: Rosenda ( RN)  Recent Surgery of Procedure: No  Baseline Level of Consciousness (LOC): a/o x 4  Oxygen Use: Yes, 4 liters  Type: nc  Diabetic: Yes, 188  Last BP Med Given Day of Dialysis: hydralazine 100  Last Pain Med Given: oxycodone  Lab Tests to be Obtained with Dialysis: Yes  Blood Transfusion to be Given During Dialysis: No  Available IV Access: Yes  Medications to be Administered During Dialysis: No  Continuous IV Infusion Running: No  Restraints on Currently or in the Last 24 Hours: No  Hand-Off Communication: No acute overnight or morning events; vss; Pt did not take morning medications; Pt will need labs; Pt is a full code; Priscilla Koenig RN.  Dialysis Catheter Dressing: fistula left arm  Last Dressing Change: fistula left arm

## 2024-05-16 NOTE — NURSING NOTE
Report to Receiving RN:    Report To: Carolyn ROLLINS   Time Report Called: 0705  Hand-Off Communication: HD completed and tolerated well, no issues during treatment. Removed 2 L. Post VS 14/68 HR 71.   Complications During Treatment: No  Ultrafiltration Treatment: Yes  Medications Administered During Dialysis: No  Blood Products Administered During Dialysis: No  Labs Sent During Dialysis: No  Heparin Drip Rate Changes: N/A  Dialysis Catheter Dressing: N/A  Last Dressing Change: N/A

## 2024-05-16 NOTE — PROGRESS NOTES
Occupational Therapy                 Therapy Communication Note    Patient Name: Manolo Bashir  MRN: [id number]  Today's Date: 5/16/2024     Discipline: Occupational Therapy    Missed Visit Reason: Missed Visit Reason: Patient refused (Pt sleeping in bed upon arrival. Encouraged to paritcipate in OT eval but pt ultimately not agreeable and wanting to rest. Will reattempt as schedule permits.)    Missed Time: Attempt

## 2024-05-16 NOTE — PROGRESS NOTES
Vancomycin Dosing by Pharmacy- Cessation of Therapy    Consult to pharmacy for vancomycin dosing has been discontinued by the prescriber, pharmacy will sign off at this time.    Please call pharmacy if there are further questions or re-enter a consult if vancomycin is resumed.     Chris Foster, PharmD

## 2024-05-17 ENCOUNTER — PHARMACY VISIT (OUTPATIENT)
Dept: PHARMACY | Facility: CLINIC | Age: 69
End: 2024-05-17
Payer: MEDICAID

## 2024-05-17 ENCOUNTER — APPOINTMENT (OUTPATIENT)
Dept: TRANSPLANT | Facility: HOSPITAL | Age: 69
End: 2024-05-17
Payer: COMMERCIAL

## 2024-05-17 ENCOUNTER — APPOINTMENT (OUTPATIENT)
Dept: DIALYSIS | Facility: HOSPITAL | Age: 69
End: 2024-05-17
Payer: COMMERCIAL

## 2024-05-17 ENCOUNTER — DOCUMENTATION (OUTPATIENT)
Dept: PHYSICAL THERAPY | Facility: CLINIC | Age: 69
End: 2024-05-17
Payer: COMMERCIAL

## 2024-05-17 ENCOUNTER — APPOINTMENT (OUTPATIENT)
Dept: RADIOLOGY | Facility: HOSPITAL | Age: 69
End: 2024-05-17
Payer: COMMERCIAL

## 2024-05-17 LAB
ALBUMIN SERPL BCP-MCNC: 2.9 G/DL (ref 3.4–5)
ALP SERPL-CCNC: 41 U/L (ref 33–136)
ALT SERPL W P-5'-P-CCNC: 14 U/L (ref 10–52)
ANION GAP SERPL CALC-SCNC: 9 MMOL/L (ref 10–20)
AST SERPL W P-5'-P-CCNC: 13 U/L (ref 9–39)
BASOPHILS # BLD MANUAL: 0 X10*3/UL (ref 0–0.1)
BASOPHILS NFR BLD MANUAL: 0 %
BILIRUB SERPL-MCNC: 0.3 MG/DL (ref 0–1.2)
BUN SERPL-MCNC: 26 MG/DL (ref 6–23)
CALCIUM SERPL-MCNC: 8.3 MG/DL (ref 8.6–10.6)
CHLORIDE SERPL-SCNC: 100 MMOL/L (ref 98–107)
CO2 SERPL-SCNC: 32 MMOL/L (ref 21–32)
CREAT SERPL-MCNC: 4.93 MG/DL (ref 0.5–1.3)
EGFRCR SERPLBLD CKD-EPI 2021: 12 ML/MIN/1.73M*2
EOSINOPHIL # BLD MANUAL: 0.11 X10*3/UL (ref 0–0.7)
EOSINOPHIL NFR BLD MANUAL: 3.3 %
ERYTHROCYTE [DISTWIDTH] IN BLOOD BY AUTOMATED COUNT: 13.9 % (ref 11.5–14.5)
GLUCOSE BLD MANUAL STRIP-MCNC: 122 MG/DL (ref 74–99)
GLUCOSE BLD MANUAL STRIP-MCNC: 132 MG/DL (ref 74–99)
GLUCOSE BLD MANUAL STRIP-MCNC: 22 MG/DL (ref 74–99)
GLUCOSE BLD MANUAL STRIP-MCNC: 25 MG/DL (ref 74–99)
GLUCOSE BLD MANUAL STRIP-MCNC: 83 MG/DL (ref 74–99)
GLUCOSE SERPL-MCNC: 138 MG/DL (ref 74–99)
HCT VFR BLD AUTO: 24.5 % (ref 41–52)
HGB BLD-MCNC: 7.8 G/DL (ref 13.5–17.5)
IMM GRANULOCYTES # BLD AUTO: 0.18 X10*3/UL (ref 0–0.7)
IMM GRANULOCYTES NFR BLD AUTO: 5.6 % (ref 0–0.9)
LIPASE SERPL-CCNC: 3 U/L (ref 9–82)
LYMPHOCYTES # BLD MANUAL: 0.13 X10*3/UL (ref 1.2–4.8)
LYMPHOCYTES NFR BLD MANUAL: 4.2 %
MCH RBC QN AUTO: 28.3 PG (ref 26–34)
MCHC RBC AUTO-ENTMCNC: 31.8 G/DL (ref 32–36)
MCV RBC AUTO: 89 FL (ref 80–100)
MONOCYTES # BLD MANUAL: 0.05 X10*3/UL (ref 0.1–1)
MONOCYTES NFR BLD MANUAL: 1.7 %
MYELOCYTES # BLD MANUAL: 0.03 X10*3/UL
MYELOCYTES NFR BLD MANUAL: 0.8 %
NEUTS SEG # BLD MANUAL: 2.88 X10*3/UL (ref 1.2–7)
NEUTS SEG NFR BLD MANUAL: 90 %
NRBC BLD-RTO: 0 /100 WBCS (ref 0–0)
PLATELET # BLD AUTO: 91 X10*3/UL (ref 150–450)
POTASSIUM SERPL-SCNC: 3.7 MMOL/L (ref 3.5–5.3)
PROT SERPL-MCNC: 5.4 G/DL (ref 6.4–8.2)
RBC # BLD AUTO: 2.76 X10*6/UL (ref 4.5–5.9)
RBC MORPH BLD: ABNORMAL
SODIUM SERPL-SCNC: 137 MMOL/L (ref 136–145)
TACROLIMUS BLD-MCNC: 5.9 NG/ML
TOTAL CELLS COUNTED BLD: 120
WBC # BLD AUTO: 3.2 X10*3/UL (ref 4.4–11.3)

## 2024-05-17 PROCEDURE — 85027 COMPLETE CBC AUTOMATED: CPT | Performed by: STUDENT IN AN ORGANIZED HEALTH CARE EDUCATION/TRAINING PROGRAM

## 2024-05-17 PROCEDURE — 2500000004 HC RX 250 GENERAL PHARMACY W/ HCPCS (ALT 636 FOR OP/ED): Performed by: STUDENT IN AN ORGANIZED HEALTH CARE EDUCATION/TRAINING PROGRAM

## 2024-05-17 PROCEDURE — 74018 RADEX ABDOMEN 1 VIEW: CPT | Performed by: RADIOLOGY

## 2024-05-17 PROCEDURE — 2500000001 HC RX 250 WO HCPCS SELF ADMINISTERED DRUGS (ALT 637 FOR MEDICARE OP)

## 2024-05-17 PROCEDURE — 74018 RADEX ABDOMEN 1 VIEW: CPT

## 2024-05-17 PROCEDURE — 97165 OT EVAL LOW COMPLEX 30 MIN: CPT | Mod: GO

## 2024-05-17 PROCEDURE — 2500000006 HC RX 250 W HCPCS SELF ADMINISTERED DRUGS (ALT 637 FOR ALL PAYERS): Performed by: STUDENT IN AN ORGANIZED HEALTH CARE EDUCATION/TRAINING PROGRAM

## 2024-05-17 PROCEDURE — 2500000002 HC RX 250 W HCPCS SELF ADMINISTERED DRUGS (ALT 637 FOR MEDICARE OP, ALT 636 FOR OP/ED): Performed by: STUDENT IN AN ORGANIZED HEALTH CARE EDUCATION/TRAINING PROGRAM

## 2024-05-17 PROCEDURE — 2500000004 HC RX 250 GENERAL PHARMACY W/ HCPCS (ALT 636 FOR OP/ED)

## 2024-05-17 PROCEDURE — 2500000005 HC RX 250 GENERAL PHARMACY W/O HCPCS

## 2024-05-17 PROCEDURE — 82947 ASSAY GLUCOSE BLOOD QUANT: CPT

## 2024-05-17 PROCEDURE — 85007 BL SMEAR W/DIFF WBC COUNT: CPT | Performed by: STUDENT IN AN ORGANIZED HEALTH CARE EDUCATION/TRAINING PROGRAM

## 2024-05-17 PROCEDURE — 99231 SBSQ HOSP IP/OBS SF/LOW 25: CPT | Performed by: STUDENT IN AN ORGANIZED HEALTH CARE EDUCATION/TRAINING PROGRAM

## 2024-05-17 PROCEDURE — 80197 ASSAY OF TACROLIMUS: CPT | Performed by: STUDENT IN AN ORGANIZED HEALTH CARE EDUCATION/TRAINING PROGRAM

## 2024-05-17 PROCEDURE — 99232 SBSQ HOSP IP/OBS MODERATE 35: CPT | Performed by: INTERNAL MEDICINE

## 2024-05-17 PROCEDURE — 8010000001 HC DIALYSIS - HEMODIALYSIS PER DAY

## 2024-05-17 PROCEDURE — 2500000001 HC RX 250 WO HCPCS SELF ADMINISTERED DRUGS (ALT 637 FOR MEDICARE OP): Performed by: STUDENT IN AN ORGANIZED HEALTH CARE EDUCATION/TRAINING PROGRAM

## 2024-05-17 PROCEDURE — 83690 ASSAY OF LIPASE: CPT

## 2024-05-17 PROCEDURE — 1210000001 HC SEMI-PRIVATE ROOM DAILY

## 2024-05-17 PROCEDURE — 2500000004 HC RX 250 GENERAL PHARMACY W/ HCPCS (ALT 636 FOR OP/ED): Performed by: INTERNAL MEDICINE

## 2024-05-17 PROCEDURE — 36415 COLL VENOUS BLD VENIPUNCTURE: CPT | Performed by: STUDENT IN AN ORGANIZED HEALTH CARE EDUCATION/TRAINING PROGRAM

## 2024-05-17 PROCEDURE — 80053 COMPREHEN METABOLIC PANEL: CPT | Performed by: STUDENT IN AN ORGANIZED HEALTH CARE EDUCATION/TRAINING PROGRAM

## 2024-05-17 RX ORDER — METOCLOPRAMIDE HYDROCHLORIDE 5 MG/ML
10 INJECTION INTRAMUSCULAR; INTRAVENOUS EVERY 6 HOURS PRN
Status: DISCONTINUED | OUTPATIENT
Start: 2024-05-17 | End: 2024-05-19 | Stop reason: HOSPADM

## 2024-05-17 RX ORDER — DEXTROSE 50 % IN WATER (D50W) INTRAVENOUS SYRINGE
25
Status: DISCONTINUED | OUTPATIENT
Start: 2024-05-17 | End: 2024-05-19 | Stop reason: HOSPADM

## 2024-05-17 RX ORDER — BISMUTH SUBSALICYLATE 262 MG/1
524 TABLET ORAL DAILY PRN
Status: DISCONTINUED | OUTPATIENT
Start: 2024-05-17 | End: 2024-05-19 | Stop reason: HOSPADM

## 2024-05-17 RX ORDER — DEXTROSE 50 % IN WATER (D50W) INTRAVENOUS SYRINGE
12.5
Status: DISCONTINUED | OUTPATIENT
Start: 2024-05-17 | End: 2024-05-19 | Stop reason: HOSPADM

## 2024-05-17 RX ORDER — TACROLIMUS 1 MG/1
1 CAPSULE ORAL NIGHTLY
Status: DISCONTINUED | OUTPATIENT
Start: 2024-05-17 | End: 2024-05-19 | Stop reason: HOSPADM

## 2024-05-17 RX ORDER — TACROLIMUS 1 MG/1
1 CAPSULE ORAL EVERY MORNING
Status: DISCONTINUED | OUTPATIENT
Start: 2024-05-18 | End: 2024-05-19 | Stop reason: HOSPADM

## 2024-05-17 RX ORDER — GUAIFENESIN 600 MG/1
600 TABLET, EXTENDED RELEASE ORAL 2 TIMES DAILY PRN
Status: DISCONTINUED | OUTPATIENT
Start: 2024-05-17 | End: 2024-05-19 | Stop reason: HOSPADM

## 2024-05-17 RX ADMIN — NIFEDIPINE 60 MG: 60 TABLET, FILM COATED, EXTENDED RELEASE ORAL at 19:58

## 2024-05-17 RX ADMIN — OXYCODONE HYDROCHLORIDE 5 MG: 5 TABLET ORAL at 13:58

## 2024-05-17 RX ADMIN — LIDOCAINE 1 PATCH: 4 PATCH TOPICAL at 13:49

## 2024-05-17 RX ADMIN — ISOSORBIDE MONONITRATE 60 MG: 60 TABLET, EXTENDED RELEASE ORAL at 13:50

## 2024-05-17 RX ADMIN — METOCLOPRAMIDE 5 MG: 10 TABLET ORAL at 13:57

## 2024-05-17 RX ADMIN — DEXTROSE MONOHYDRATE 25 G: 25 INJECTION, SOLUTION INTRAVENOUS at 00:11

## 2024-05-17 RX ADMIN — PRAVASTATIN SODIUM 10 MG: 20 TABLET ORAL at 19:57

## 2024-05-17 RX ADMIN — ACETAMINOPHEN 975 MG: 325 TABLET ORAL at 18:43

## 2024-05-17 RX ADMIN — METOCLOPRAMIDE 5 MG: 10 TABLET ORAL at 19:58

## 2024-05-17 RX ADMIN — ONDANSETRON 4 MG: 2 INJECTION INTRAMUSCULAR; INTRAVENOUS at 06:01

## 2024-05-17 RX ADMIN — CARVEDILOL 37.5 MG: 12.5 TABLET, FILM COATED ORAL at 19:56

## 2024-05-17 RX ADMIN — OXYCODONE HYDROCHLORIDE 5 MG: 5 TABLET ORAL at 19:58

## 2024-05-17 RX ADMIN — TACROLIMUS 1 MG: 1 CAPSULE ORAL at 18:43

## 2024-05-17 RX ADMIN — INSULIN GLARGINE 8 UNITS: 100 INJECTION, SOLUTION SUBCUTANEOUS at 14:00

## 2024-05-17 ASSESSMENT — COGNITIVE AND FUNCTIONAL STATUS - GENERAL
DRESSING REGULAR LOWER BODY CLOTHING: A LITTLE
DAILY ACTIVITIY SCORE: 22
CLIMB 3 TO 5 STEPS WITH RAILING: A LITTLE
HELP NEEDED FOR BATHING: A LITTLE
DAILY ACTIVITIY SCORE: 24
WALKING IN HOSPITAL ROOM: A LITTLE
WALKING IN HOSPITAL ROOM: A LITTLE
DAILY ACTIVITIY SCORE: 22
HELP NEEDED FOR BATHING: A LITTLE
MOBILITY SCORE: 22
DRESSING REGULAR LOWER BODY CLOTHING: A LITTLE
MOBILITY SCORE: 22
CLIMB 3 TO 5 STEPS WITH RAILING: A LITTLE

## 2024-05-17 ASSESSMENT — PAIN - FUNCTIONAL ASSESSMENT
PAIN_FUNCTIONAL_ASSESSMENT: 0-10
PAIN_FUNCTIONAL_ASSESSMENT: NO/DENIES PAIN
PAIN_FUNCTIONAL_ASSESSMENT: 0-10
PAIN_FUNCTIONAL_ASSESSMENT: NO/DENIES PAIN

## 2024-05-17 ASSESSMENT — ACTIVITIES OF DAILY LIVING (ADL)
ADL_ASSISTANCE: INDEPENDENT
BATHING_ASSISTANCE: STAND BY

## 2024-05-17 ASSESSMENT — PAIN SCALES - GENERAL
PAINLEVEL_OUTOF10: 10 - WORST POSSIBLE PAIN
PAINLEVEL_OUTOF10: 7
PAINLEVEL_OUTOF10: 0 - NO PAIN
PAINLEVEL_OUTOF10: 8
PAINLEVEL_OUTOF10: 0 - NO PAIN
PAINLEVEL_OUTOF10: 0 - NO PAIN
PAINLEVEL_OUTOF10: 10 - WORST POSSIBLE PAIN

## 2024-05-17 ASSESSMENT — PAIN DESCRIPTION - DESCRIPTORS: DESCRIPTORS: ACHING;DISCOMFORT

## 2024-05-17 ASSESSMENT — PAIN DESCRIPTION - LOCATION: LOCATION: BACK

## 2024-05-17 NOTE — PROGRESS NOTES
Occupational Therapy                 Therapy Communication Note    Patient Name: Manolo Bashir  MRN: [id number]  Today's Date: 5/17/2024     Discipline: Occupational Therapy    Missed Visit Reason: Missed Visit Reason: Patient in a medical procedure (Pt off floor in dialysis all AM- will reattempt as schedule permits)    Missed Time: Attempt

## 2024-05-17 NOTE — PROGRESS NOTES
Clinicals faxed to [facility] intake this AM. Patient requesting MWF first shift at [facility]. Intake working on securing chair and has all necessary clinical information. SW called at 3:14p to try to confirm chair but had to leave a voicemail. Will continue to try to confirm chair.     Update 1712:  Confirmed dialysis chair with [facility]. However, they do not have MWF availability and can only offer TTS schedule at 6:30am arrival time. Medical team notified of schedule change. Discharge anticipated tomorrow. Medical team aware that he may need to have dialysis prior to discharge.    Elizabeth Payne LCSW

## 2024-05-17 NOTE — DISCHARGE INSTRUCTIONS
Dear Manolo Bashir    You were admitted on 5/11/2024 for shortness of breath and volume overload. During hospitalization, you had dialysis sessions and fluid removed. You were also treated with antibiotics during hospitalization for a pneumonia. You are being discharged with a plan for Tuesday, Thursday, and Saturday dialysis at 6:30AM at the [facility] in [facility]. You have referrals to an outpatient diabetes educator and to schedule an outpatient sleep study. You will receive a phone call to schedule both of these. There are several medication changes listed below:     After discharge:   - STOP taking azathioprine  - Take tacrolimus 1mg in the morning and 1mg in the evening  - Take bumex 2mg once daily on non-dialysis days (Sunday, Monday, Wednesday, Friday)  - Take glargine 4U every night (decreased from 20U before hospitalization)             1. Shortness of breath  Walker rolling    Referral to Home Health    Shower chair      2. Pneumonia due to infectious organism, unspecified laterality, unspecified part of lung  Walker rolling    Referral to Home Health    Shower chair      3. Hypervolemia, unspecified hypervolemia type        4. Hypoglycemia        5. Localized swelling of right upper extremity  Vascular US upper extremity venous duplex right    Vascular US upper extremity venous duplex right    Walker rolling    Referral to Home Health    Shower chair      6. Other specified soft tissue disorders  Vascular US upper extremity venous duplex right      7. Adenocarcinoma of prostate (Multi)  Walker rolling    Referral to Home Health    Shower chair      8. Kidney replaced by transplant ([facility])  Walker rolling    Referral to Home Health    Shower chair      9. ESRD (end stage renal disease) (Multi)  B complex-vitamin C-folic acid (Nephrocaps) 1 mg capsule          We wish you all the best and it was our pleasure to care for you.  Your  Care Team

## 2024-05-17 NOTE — CARE PLAN
Problem: Pain  Goal: My pain/discomfort is manageable  Outcome: Progressing     Problem: Safety  Goal: Patient will be injury free during hospitalization  Outcome: Progressing  Goal: I will remain free of falls  Outcome: Progressing     Problem: Daily Care  Goal: Daily care needs are met  Outcome: Progressing   The patient's goals for the shift include      The clinical goals for the shift include Patient will remain safe from falls and injury.Vitals will be WNL

## 2024-05-17 NOTE — PROGRESS NOTES
Manolo Bashir is a 68 y.o. male on day 6 of admission presenting with Shortness of breath.      Subjective   5/17: Patient seen in dialysis unit. Tolerating HD unit fine. Not willing to talk much today. Breathing about the same.       Objective          Vitals 24HR  Heart Rate:  []   Temp:  [36.3 °C (97.3 °F)-36.9 °C (98.4 °F)]   Resp:  [16-20]   BP: (119-166)/(55-70)   SpO2:  [88 %-100 %]     Intake/Output last 3 Shifts:    Intake/Output Summary (Last 24 hours) at 5/17/2024 1251  Last data filed at 5/17/2024 1149  Gross per 24 hour   Intake 1400 ml   Output 5800 ml   Net -4400 ml         Physical Exam  General appearance: no acute distress  Eyes: non-icteric  Skin: no apparent rash  Heart: rhythm regular  Lungs: bibasilar crackles, on nc  Abdomen: soft, nt/nd  Extremities: trace leg edema bilat, RUE swelling  : no Cedeño  Neuro: follows commands  ACCESS: LUE AVG - palpable thrill, audible bruit      Relevant Results    Current Facility-Administered Medications:     acetaminophen (Tylenol) tablet 975 mg, 975 mg, oral, q6h PRN, Amanda Rivera MD, 975 mg at 05/16/24 2216    amoxicillin-pot clavulanate (Augmentin) 500-125 mg per tablet 1 tablet, 1 tablet, oral, q24h, Chris Weaver MD, 1 tablet at 05/16/24 1102    aspirin chewable tablet 81 mg, 81 mg, oral, Daily, Nadia Camarena MD, 81 mg at 05/16/24 1058    [Held by provider] azaTHIOprine (Imuran) tablet 50 mg, 50 mg, oral, Daily, Amanda Rivera MD    B complex-vitamin C-folic acid (Nephrocaps) capsule 1 capsule, 1 capsule, oral, Daily, ANÍBAL Jessica-CNP    bismuth subsalicylate (Pepto Bismol) chewable tablet 524 mg, 524 mg, oral, Daily PRN, Chris Weaver MD    calcitriol (Rocaltrol) capsule 0.25 mcg, 0.25 mcg, oral, Daily, Amanda Rivera MD, 0.25 mcg at 05/16/24 1058    carvedilol (Coreg) tablet 37.5 mg, 37.5 mg, oral, BID, Amanda Rivera MD, 37.5 mg at 05/16/24 2999    cinacalcet (Sensipar) tablet 30 mg, 30  mg, oral, Daily, Amanda Rivera MD, 30 mg at 05/16/24 1058    epoetin aida (Epogen,Procrit) injection 10,000 Units, 150 Units/kg, subcutaneous, Weekly, Amanda Rivera MD, 10,000 Units at 05/13/24 1642    glucagon (Glucagen) injection 1 mg, 1 mg, intramuscular, q15 min PRN, Amanda Rivera MD    glucagon (Glucagen) injection 1 mg, 1 mg, intramuscular, q15 min PRN, Amanda Rivera MD    guaiFENesin (Mucinex) 12 hr tablet 600 mg, 600 mg, oral, BID PRN, Chris Weaver MD    [Held by provider] heparin (porcine) injection 5,000 Units, 5,000 Units, subcutaneous, q8h ZOË, Nadia Camarena MD, 5,000 Units at 05/15/24 0534    hydrALAZINE (Apresoline) tablet 100 mg, 100 mg, oral, q8h, Amanda Rievra MD, 100 mg at 05/16/24 2216    insulin glargine (Lantus) injection 8 Units, 8 Units, subcutaneous, q24h, Amanda Rivera MD, 8 Units at 05/16/24 1059    insulin lispro (HumaLOG) injection 0-5 Units, 0-5 Units, subcutaneous, TID, Nadia Camarena MD, 5 Units at 05/12/24 2018    isosorbide mononitrate ER (Imdur) 24 hr tablet 60 mg, 60 mg, oral, Daily, Amanda Rivera MD, 60 mg at 05/16/24 1058    lidocaine 4 % patch 1 patch, 1 patch, transdermal, Daily, Nadia Camarena MD, 1 patch at 05/16/24 1057    metoclopramide (Reglan) injection 10 mg, 10 mg, intravenous, q6h PRN, Chris Weaver MD    metoclopramide (Reglan) tablet 5 mg, 5 mg, oral, BID, Amanda Rivera MD, 5 mg at 05/16/24 2216    NIFEdipine ER (Adalat CC) 24 hr tablet 60 mg, 60 mg, oral, BID, Amanda Rivera MD, 60 mg at 05/16/24 2216    ondansetron (Zofran) injection 4 mg, 4 mg, intravenous, q8h PRN, Amanda Rivera MD, 4 mg at 05/17/24 0601    ondansetron (Zofran) tablet 4 mg, 4 mg, oral, q8h PRN, Amanda Rivera MD, 4 mg at 05/11/24 1215    oxyCODONE (Roxicodone) immediate release tablet 5 mg, 5 mg, oral, q6h PRN, Amanda Rivera MD, 5 mg at 05/16/24 2220    oxygen  (O2) therapy, , inhalation, Continuous PRN - O2/gases, Daniel Peña MD, 4 L/min at 05/14/24 1105    pantoprazole (ProtoNix) EC tablet 40 mg, 40 mg, oral, Daily before breakfast, Amanda Rivera MD, 40 mg at 05/16/24 0626    pravastatin (Pravachol) tablet 10 mg, 10 mg, oral, Nightly, Amanda Rivera MD, 10 mg at 05/16/24 2216    predniSONE (Deltasone) tablet 5 mg, 5 mg, oral, q AM, Amanda Rivera MD, 5 mg at 05/16/24 1058    [START ON 5/18/2024] tacrolimus (Prograf) capsule 1 mg, 1 mg, oral, q AM **AND** tacrolimus (Prograf) capsule 1 mg, 1 mg, oral, Nightly, Don Jain MD    tacrolimus (Protopic) 0.1 % ointment, , Topical, BID, Amanda Rivera MD, Given at 05/16/24 2100    traMADol (Ultram) tablet 50 mg, 50 mg, oral, q12h PRN, Amanda Rivera MD, 50 mg at 05/16/24 1629    Results for orders placed or performed during the hospital encounter of 05/11/24 (from the past 24 hour(s))   POCT GLUCOSE   Result Value Ref Range    POCT Glucose 134 (H) 74 - 99 mg/dL   POCT GLUCOSE   Result Value Ref Range    POCT Glucose 137 (H) 74 - 99 mg/dL   Tacrolimus level   Result Value Ref Range    Tacrolimus  5.9 <=15.0 ng/mL   Comprehensive metabolic panel   Result Value Ref Range    Glucose 138 (H) 74 - 99 mg/dL    Sodium 137 136 - 145 mmol/L    Potassium 3.7 3.5 - 5.3 mmol/L    Chloride 100 98 - 107 mmol/L    Bicarbonate 32 21 - 32 mmol/L    Anion Gap 9 (L) 10 - 20 mmol/L    Urea Nitrogen 26 (H) 6 - 23 mg/dL    Creatinine 4.93 (H) 0.50 - 1.30 mg/dL    eGFR 12 (L) >60 mL/min/1.73m*2    Calcium 8.3 (L) 8.6 - 10.6 mg/dL    Albumin 2.9 (L) 3.4 - 5.0 g/dL    Alkaline Phosphatase 41 33 - 136 U/L    Total Protein 5.4 (L) 6.4 - 8.2 g/dL    AST 13 9 - 39 U/L    Bilirubin, Total 0.3 0.0 - 1.2 mg/dL    ALT 14 10 - 52 U/L   CBC and Auto Differential   Result Value Ref Range    WBC 3.2 (L) 4.4 - 11.3 x10*3/uL    nRBC 0.0 0.0 - 0.0 /100 WBCs    RBC 2.76 (L) 4.50 - 5.90 x10*6/uL    Hemoglobin  7.8 (L) 13.5 - 17.5 g/dL    Hematocrit 24.5 (L) 41.0 - 52.0 %    MCV 89 80 - 100 fL    MCH 28.3 26.0 - 34.0 pg    MCHC 31.8 (L) 32.0 - 36.0 g/dL    RDW 13.9 11.5 - 14.5 %    Platelets 91 (L) 150 - 450 x10*3/uL    Immature Granulocytes %, Automated 5.6 (H) 0.0 - 0.9 %    Immature Granulocytes Absolute, Automated 0.18 0.00 - 0.70 x10*3/uL   Manual Differential   Result Value Ref Range    Neutrophils %, Manual 90.0 40.0 - 80.0 %    Lymphocytes %, Manual 4.2 13.0 - 44.0 %    Monocytes %, Manual 1.7 2.0 - 10.0 %    Eosinophils %, Manual 3.3 0.0 - 6.0 %    Basophils %, Manual 0.0 0.0 - 2.0 %    Myelocytes %, Manual 0.8 0.0 - 0.0 %    Seg Neutrophils Absolute, Manual 2.88 1.20 - 7.00 x10*3/uL    Lymphocytes Absolute, Manual 0.13 (L) 1.20 - 4.80 x10*3/uL    Monocytes Absolute, Manual 0.05 (L) 0.10 - 1.00 x10*3/uL    Eosinophils Absolute, Manual 0.11 0.00 - 0.70 x10*3/uL    Basophils Absolute, Manual 0.00 0.00 - 0.10 x10*3/uL    Myelocytes Absolute, Manual 0.03 0.00 - 0.00 x10*3/uL    Total Cells Counted 120     RBC Morphology No significant RBC morphology present    Lipase   Result Value Ref Range    Lipase 3 (L) 9 - 82 U/L   POCT GLUCOSE   Result Value Ref Range    POCT Glucose 132 (H) 74 - 99 mg/dL   POCT GLUCOSE   Result Value Ref Range    POCT Glucose 122 (H) 74 - 99 mg/dL        Assessment  Mr. Bashir is a 68 y.o. male with past medical history significant for ESRD s/p 1st DDKT in 1992 and 2nd DDKT in 2013 which is failing - CKD 5. He also has history include MGUS with IgG and IgA lambda, diabetes, hypertension, prostate cancer status post radical prostatectomy, urinary incontinence, HCV s/p treatment.  Admitted on 5/11/2024 for volume overload, pneumonia. Transplant Nephrology for advanced CKD in pt with of renal transplant.    1. Failed kidney transplant - initiated back on HD this admission for uremia  ESRD S/P Kidney transplant x 2 - DDKT 1992 then DDKT 2013  - Baseline creatinine had been 3.5 - 4  - labs notable  for K 3.7  - UO not charted  - on room air/nc, BP stable  - has LUE AVG 3/21/2024 - cleared for use  - initiated on HD on 5/13, then 5/14, and then 5/16  - s/p HD today with 2.5L fluid removed  - will place patient on MWF HD schedule  - likely to be on MWF HD schedule at [facility]; Case Management working to finalize disposition  - pt will need to be re-evaluated/re-listed for potentially another kidney transplant    2. Immunosuppression   - ensure tac dosed 12h apart; check tac level 30min-1h prior to AM dose  - Last tacrolimus level was 16 on 5/16 - not true trough as PM dose was 9pm and level drawn at 640am   - tac level 5.9 on 5/17  - continue tacrolimus 1mg BID, tac goal 3-4 for now  - can keep holding azathioprine for now; to re-evaluate when to resume at later time  - continue prednisone 5mg daily    3. Pneumonia/Volume overload  - on nc  - UO not documented  - can give lasix or bumex on non-HD days    4. Anemia and WBC   - Hb 7.8  - iron studies 5/11: iron low at 22, T sats low at 13%; ferritin high at 602  - s/p epogen 10k units on 5/13    5. Hypertension   -Goal BP < 140/90 mmHg   -continue current management     * Case was discussed with primary team.  For questions, please contact transplant nephrology page x [phone]    D/w Dr. Sariah Ivory MD  Nephrology Fellow PGY 5  Contact via secure chat

## 2024-05-17 NOTE — NURSING NOTE
Report from Sending RN:    Report From: Rosenda ( RN)  Recent Surgery of Procedure: No  Baseline Level of Consciousness (LOC): a/o x 4  Oxygen Use: Yes, 2 liters  Type: nc  Diabetic: No  Last BP Med Given Day of Dialysis: none  Last Pain Med Given: none  Lab Tests to be Obtained with Dialysis: No  Blood Transfusion to be Given During Dialysis: No  Available IV Access: Yes  Medications to be Administered During Dialysis: No  Continuous IV Infusion Running: No  Restraints on Currently or in the Last 24 Hours: No  Hand-Off Communication: No acute overnight or morning events; vss; pt did take morning medications; Pt will not need labs; Pt is a full code; Priscilla Koenig RN.  Dialysis Catheter Dressing: fistula  Last Dressing Change: fistula

## 2024-05-17 NOTE — PROGRESS NOTES
Physical Therapy    Discharge Summary    Name: Manolo Bashir  MRN: [id number]  : 1955  Date: 24    Discharge Summary: PT    Discharge Information: Date of discharge 2024, Date of last visit 2024, Date of evaluation 2024, Number of attended visits 1, Referred by DR Yung, and Referred for lumbar radiculopathy     Therapy Summary: patient evaluated, instructed in postural corrections/awareness, and repeated movement exercises with goal of tracking further symptoms response. Patient never scheduled recommended follow ups    Discharge Status: NA     Rehab Discharge Reason: Follow ups not schedule, and chart is closed due to inactivity

## 2024-05-17 NOTE — PROGRESS NOTES
Occupational Therapy    Evaluation    Patient Name: Manolo Bashir  MRN: [id number]  Today's Date: 5/17/2024  Room: [address] [place], [place] [address]  Time Calculation  Start Time: 1452  Stop Time: 1502  Time Calculation (min): 10 min    Assessment  IP OT Assessment  OT Assessment: Patient presents at reported baseline demonstrating independence with functional mobility, ADLs, and standing balance (with SBA for safety during evaluation). Pt has no skilled OT needs at this time.  Prognosis: Excellent  Barriers to Discharge: None  Evaluation/Treatment Tolerance: Patient tolerated treatment well  Medical Staff Made Aware: Yes  End of Session Communication: Bedside nurse  End of Session Patient Position: Bed, 3 rail up, Alarm on  Plan:  No Skilled OT: At baseline function  OT Discharge Recommendations: No OT needed after discharge, No further acute OT  OT Recommended Transfer Status: Assist of 1, Stand by assist  OT - OK to Discharge: Yes    Subjective   Current Problem:  1. Shortness of breath  Walker rolling    Referral to Home Health    Shower chair      2. Pneumonia due to infectious organism, unspecified laterality, unspecified part of lung  Walker rolling    Referral to Home Health    Shower chair      3. Hypervolemia, unspecified hypervolemia type        4. Hypoglycemia        5. Localized swelling of right upper extremity  Vascular US upper extremity venous duplex right    Vascular US upper extremity venous duplex right    Walker rolling    Referral to Home Health    Shower chair      6. Other specified soft tissue disorders  Vascular US upper extremity venous duplex right      7. Adenocarcinoma of prostate (Multi)  Walker rolling    Referral to Home Health    Shower chair      8. Kidney replaced by transplant ([facility])  Walker rolling    Referral to Home Health    Shower chair      9. ESRD (end stage renal disease) (Multi)  B complex-vitamin C-folic acid (Nephrocaps) 1 mg capsule        General:  Reason for Referral: Admitted 5/11  "with dypsnea and weakness; dx: PNA, hypervolemia, CHF exacerbation, failed kidney transplant  Past Medical History Relevant to Rehab: head lice 3/23/24, ESRD on HD now s/p 2 time renal transplant (1992, 2013), CKD 3, IgG and IgA lambda MGUS, DM2, HTN, prostate cancer s/p radical prostatectomy, baseline urinary incontinence, HCV, anemia, OA, cataracts, COVID  Prior to Session Communication: Bedside nurse  Patient Position Received: Bed, 3 rail up, Alarm off, not on at start of session  Family/Caregiver Present: No  General Comment: Pt supine in bed sleeping upon arrival. Hesitant to participate but ultimately agreeable with encouragement.   Precautions:  Medical Precautions: Fall precautions    Pain:  Pain Assessment  Pain Assessment: 0-10  Pain Score: 10 - Worst possible pain  Pain Type: Chronic pain  Pain Location: Back  Pain Orientation:  (\"all of my back\")  Pain Interventions: Repositioned, Rest  Response to Interventions: unchanged    Objective   Cognition:  Overall Cognitive Status: Within Functional Limits  Orientation Level: Oriented X4     Home Living:  Type of Home: House  Lives With: Adult children (Dtr- works during day)  Home Adaptive Equipment: None  Home Layout: Multi-level, Bed/bath upstairs  Alternate Level Stairs-Rails: Right  Alternate Level Stairs-Number of Steps: 6+6  Home Access: Stairs to enter with rails  Entrance Stairs-Rails: Both  Entrance Stairs-Number of Steps: 6  Bathroom Shower/Tub: Tub/shower unit  Bathroom Toilet: Standard  Bathroom Equipment: None   Prior Function:  Level of [place]: Independent with ADLs and functional transfers, Independent with homemaking with ambulation  ADL Assistance: Independent  Homemaking Assistance: Independent  Ambulatory Assistance: Independent  Vocational: On disability  Hand Dominance: Right  IADL History:  Homemaking Responsibilities: Yes  Homemaking Comments: Primary homemaker, shares tasks with daughter  Current License: Yes  Mode of " Transportation: Car  Occupation: On disability  Leisure and Hobbies: Spend time with grandHitsbookds  ADL:  Eating Assistance: Independent (Anticipate)  Grooming Assistance: Independent (Anticipate)  Bathing Assistance: Stand by (Anticipate)  Bathing Deficit: Use of adaptive equipment, Supervision/safety  UE Dressing Assistance: Independent (Anticipate)  LE Dressing Assistance: Stand by (Anticipate)  LE Dressing Deficit: Supervision/safety  Toileting Assistance with Device: Modified independent (Anticipate)  Activity Tolerance:  Endurance: Endurance does not limit participation in activity  Balance:  Dynamic Sitting Balance  Dynamic Sitting-Comments: SBA  Dynamic Standing Balance  Dynamic Standing-Comments: SBA  Static Sitting Balance  Static Sitting-Comment/Number of Minutes: SBA  Static Standing Balance  Static Standing-Comment/Number of Minutes: SBA  Bed Mobility/Transfers: Bed Mobility  Bed Mobility: Yes  Bed Mobility 1  Bed Mobility 1: Supine to sitting, Sitting to supine  Level of Assistance 1: Distant supervision  Bed Mobility Comments 1: HOB elevated, use of handrails  Functional Mobility  Functional Mobility Performed: Yes  Functional Mobility 1  Comments 1: Pt ambulated MOD household distance without AD and with SBA from bedside into bathroom and back   and Transfers  Transfer: Yes  Transfer 1  Transfer From 1: Bed to, Stand to  Transfer to 1: Stand, Bed  Technique 1: Sit to stand, Stand to sit  Transfer Level of Assistance 1: Close supervision  Transfers 2  Transfer From 2: Stand to, Toilet to  Transfer to 2: Toilet, Stand  Technique 2: Sit to stand, Stand to sit  Transfer Level of Assistance 2: Close supervision  IADL's:   Homemaking Responsibilities: Yes  Homemaking Comments: Primary homemaker, shares tasks with daughter  Current License: Yes  Mode of Transportation: Car  Occupation: On disability  Leisure and Hobbies: Spend time with grandkids  Vision: Vision - Basic Assessment  Current Vision: No visual  deficits   and Vision - Complex Assessment  Ocular Range of Motion: Within Functional Limits  Sensation:  Light Touch: No apparent deficits  Strength:  Strength Comments: BUE WFL  Perception:  Inattention/Neglect: Appears intact  Coordination:  Movements are Fluid and Coordinated: Yes   Hand Function:  Hand Function  Gross Grasp: Functional  Coordination: Functional  Extremities: RUE   RUE : Within Functional Limits, LUE   LUE: Within Functional Limits    Outcome Measures: [facility] Daily Activity  Putting on and taking off regular lower body clothing: A little  Bathing (including washing, rinsing, drying): A little  Putting on and taking off regular upper body clothing: None  Toileting, which includes using toilet, bedpan or urinal: None  Taking care of personal grooming such as brushing teeth: None  Eating Meals: None  Daily Activity - Total Score: 22    OT Adult Other Outcome Measures  4AT: -    Education Documentation  Precautions, taught by Quinn Rodriguez OT at 5/17/2024  4:14 PM.  Learner: Patient  Readiness: Acceptance  Method: Explanation  Response: Verbalizes Understanding, Demonstrated Understanding    Body Mechanics, taught by Quinn Rodriguez OT at 5/17/2024  4:14 PM.  Learner: Patient  Readiness: Acceptance  Method: Explanation  Response: Verbalizes Understanding, Demonstrated Understanding    Education Comments  No comments found.        05/17/24 at 4:14 PM   Quinn Rodriguez OT   Rehab Office: [phone]

## 2024-05-17 NOTE — PROGRESS NOTES
"Manolo Bashir is a 68 y.o. male on day 6 of admission presenting with Shortness of breath.    Subjective   Pt seen sitting up in bed, in mild distress; appears ill and periodically vomits into a trash can. He states he started feeling diffuse ABD pain after eating some \"turkey and rice\" for dinner last night, which he did not finish because the turkey was \"dry\". He was then coughing and \"spitting up\" all night d/t sinus drainage, before he started feeling nauseous and vomiting around 5:30 AM. In general, he reports feeling nauseous and vomiting when he hasn't eaten, when he coughs, and when he eats food \"without salt and pepper\". He was not able to keep down his morning medications. Still felt nauseous about an hour after receiving IV Zofran. Denies SOB and constipation/diarrhea       Objective     Physical Exam  Constitutional:       General: He is not in acute distress.     Appearance: He is ill-appearing.   HENT:      Head: Normocephalic and atraumatic.   Cardiovascular:      Rate and Rhythm: Normal rate and regular rhythm.      Heart sounds: Murmur heard.      Comments: 2 out of 6 systolic murmur heard greatest over the left upper sternal border  Pulmonary:      Effort: No respiratory distress.      Breath sounds: No rales.      Comments: Coarse breath sounds to all fields bilaterally  Abdominal:      General: Abdomen is flat.      Palpations: Abdomen is soft.      Tenderness: There is no abdominal tenderness.   Musculoskeletal:         General: Swelling present.      Right lower leg: No edema.      Left lower leg: No edema.      Comments: Pitting edema to right forearm and slightly proximal to the antecubital fossa. Nontender and nonerythematous with full ROM. Improved from previous.    Skin:     General: Skin is warm and dry.   Neurological:      General: No focal deficit present.      Mental Status: He is alert.   Psychiatric:         Mood and Affect: Mood normal.         Behavior: Behavior normal. " "        Last Recorded Vitals  Blood pressure 166/69, pulse 101, temperature 36.9 °C (98.4 °F), resp. rate 17, height 1.727 m (5' 8\"), weight 75.7 kg (166 lb 12.8 oz), SpO2 (!) 88%.  Intake/Output last 3 Shifts:  I/O last 3 completed shifts:  In: 1940 (25.6 mL/kg) [P.O.:240; I.V.:800 (10.6 mL/kg); Other:800; IV Piggyback:100]  Out: 4800 (63.4 mL/kg) [Other:4800]  Weight: 75.7 kg     Relevant Results        Scheduled medications  amoxicillin-pot clavulanate, 1 tablet, oral, q24h  aspirin, 81 mg, oral, Daily  [Held by provider] azaTHIOprine, 50 mg, oral, Daily  B complex-vitamin C-folic acid, 1 capsule, oral, Daily  calcitriol, 0.25 mcg, oral, Daily  carvedilol, 37.5 mg, oral, BID  cinacalcet, 30 mg, oral, Daily  epoetin aida or biosimilar, 150 Units/kg, subcutaneous, Weekly  [Held by provider] heparin (porcine), 5,000 Units, subcutaneous, q8h ZOË  hydrALAZINE, 100 mg, oral, q8h  insulin glargine, 8 Units, subcutaneous, q24h  insulin lispro, 0-5 Units, subcutaneous, TID  isosorbide mononitrate ER, 60 mg, oral, Daily  lidocaine, 1 patch, transdermal, Daily  metoclopramide, 5 mg, oral, BID  NIFEdipine ER, 60 mg, oral, BID  pantoprazole, 40 mg, oral, Daily before breakfast  pravastatin, 10 mg, oral, Nightly  predniSONE, 5 mg, oral, q AM  [START ON 5/18/2024] tacrolimus, 1 mg, oral, q AM   And  tacrolimus, 1 mg, oral, Nightly  tacrolimus, , Topical, BID      Continuous medications     PRN medications  PRN medications: acetaminophen, bismuth subsalicylate, glucagon, glucagon, guaiFENesin, metoclopramide, ondansetron, ondansetron, oxyCODONE, oxygen, traMADol  Results for orders placed or performed during the hospital encounter of 05/11/24 (from the past 24 hour(s))   POCT GLUCOSE   Result Value Ref Range    POCT Glucose 134 (H) 74 - 99 mg/dL   POCT GLUCOSE   Result Value Ref Range    POCT Glucose 137 (H) 74 - 99 mg/dL   Tacrolimus level   Result Value Ref Range    Tacrolimus  5.9 <=15.0 ng/mL   Comprehensive metabolic panel "   Result Value Ref Range    Glucose 138 (H) 74 - 99 mg/dL    Sodium 137 136 - 145 mmol/L    Potassium 3.7 3.5 - 5.3 mmol/L    Chloride 100 98 - 107 mmol/L    Bicarbonate 32 21 - 32 mmol/L    Anion Gap 9 (L) 10 - 20 mmol/L    Urea Nitrogen 26 (H) 6 - 23 mg/dL    Creatinine 4.93 (H) 0.50 - 1.30 mg/dL    eGFR 12 (L) >60 mL/min/1.73m*2    Calcium 8.3 (L) 8.6 - 10.6 mg/dL    Albumin 2.9 (L) 3.4 - 5.0 g/dL    Alkaline Phosphatase 41 33 - 136 U/L    Total Protein 5.4 (L) 6.4 - 8.2 g/dL    AST 13 9 - 39 U/L    Bilirubin, Total 0.3 0.0 - 1.2 mg/dL    ALT 14 10 - 52 U/L   CBC and Auto Differential   Result Value Ref Range    WBC 3.2 (L) 4.4 - 11.3 x10*3/uL    nRBC 0.0 0.0 - 0.0 /100 WBCs    RBC 2.76 (L) 4.50 - 5.90 x10*6/uL    Hemoglobin 7.8 (L) 13.5 - 17.5 g/dL    Hematocrit 24.5 (L) 41.0 - 52.0 %    MCV 89 80 - 100 fL    MCH 28.3 26.0 - 34.0 pg    MCHC 31.8 (L) 32.0 - 36.0 g/dL    RDW 13.9 11.5 - 14.5 %    Platelets 91 (L) 150 - 450 x10*3/uL    Immature Granulocytes %, Automated 5.6 (H) 0.0 - 0.9 %    Immature Granulocytes Absolute, Automated 0.18 0.00 - 0.70 x10*3/uL   Manual Differential   Result Value Ref Range    Neutrophils %, Manual 90.0 40.0 - 80.0 %    Lymphocytes %, Manual 4.2 13.0 - 44.0 %    Monocytes %, Manual 1.7 2.0 - 10.0 %    Eosinophils %, Manual 3.3 0.0 - 6.0 %    Basophils %, Manual 0.0 0.0 - 2.0 %    Myelocytes %, Manual 0.8 0.0 - 0.0 %    Seg Neutrophils Absolute, Manual 2.88 1.20 - 7.00 x10*3/uL    Lymphocytes Absolute, Manual 0.13 (L) 1.20 - 4.80 x10*3/uL    Monocytes Absolute, Manual 0.05 (L) 0.10 - 1.00 x10*3/uL    Eosinophils Absolute, Manual 0.11 0.00 - 0.70 x10*3/uL    Basophils Absolute, Manual 0.00 0.00 - 0.10 x10*3/uL    Myelocytes Absolute, Manual 0.03 0.00 - 0.00 x10*3/uL    Total Cells Counted 120     RBC Morphology No significant RBC morphology present    Lipase   Result Value Ref Range    Lipase 3 (L) 9 - 82 U/L   POCT GLUCOSE   Result Value Ref Range    POCT Glucose 132 (H) 74 - 99 mg/dL    POCT GLUCOSE   Result Value Ref Range    POCT Glucose 122 (H) 74 - 99 mg/dL               Assessment/Plan   Principal Problem:    Shortness of breath    #Nausea and vomiting  - Ordered ABD XR to evaluate for possible gastroparesis given h/o DMII, pending  - Pt no longer nauseous or vomiting in the afternoon; received Reglan 5 mg at 2 PM  - Start Reglan 10 mg TID PRN N/V d/t concern for gastroparesis  - Continue Zofran and Reglan 5 mg PRN     #ESRD s/p renal transplant (X2)  #Hypervolemia  :: Renal transplants in 1992 and 2013  ::B/l Cr 5.5  :: Dialysis access created 3/2024 in LUE with palpable thrill   :: TTE 4/2024 EF 54%  - Transplant nephrology following   - CTAP w/ R>L pulmonary edema and R>L moderate pleural effusions with near-complete collapse of the right lower lobe.   - Crackles resolved on exam; BLE edema improved  - Continue home Nifedipine, Hydralazine, Coreg and Indur  - Consider thoracentesis if SOB does not improve with diuresis/dialysis  - Hold aziathroprine per transplant nephrology recs  - Hold Bactrim PJP PPX;  not recommended at this time    - Received dialysis today; MWF schedule outpatient   - Bumex 3mg on non-dialysis days per nephrology PRN volume overload  - Goal tacrolimus level to be determined d/t starting HD  - Continue tacrolimus 1 mg BID and prednisone 5 mg daily for now per nephrology; pt will gradually be tapered down from immunosuppressants in setting of failed kidney transplant  - Hgb 7.8 5/17  - Continue home epoetin aida injection 10,000 units weekly   - Strict I&Os  - Daily weights      #Hospital Acquired Pneumonia  ::CXR 5/11: There are hazy and dense opacities over the right mid lung and bilateral lung bases with blunting of the costophrenic angles.   1. Bilateral pleural effusions with bibasilar atelectasis or consolidation, increased from previous radiograph on 04/03/2024. 2. Prominent interstitial and perihilar markings may reflect component of pulmonary edema.   - 5/14  Pt developed low grade fever to 100.6 and tachypnea - Continued to sat well on 2-4L O2. Later developed nausea and vomiting, which later resolved  - Pt remains afebrile  - Continue ABX (Augmentin)  - Repeat EKG unchanged from previous  - Peripheral blood cultures with no growth at 2 days  - Respiratory viral panel negative   - Continue holding aziathoprine   - Continue incentive spirometry 10 times per hour while awake   - Wean O2 as tolerated     #RUE swelling  - Right upper extremity w/ 3+ pitting edema, nonerythematous, and nontender to palpation  - No IV medication has gone through that arm per nursing; IV was not infiltrated  - Remote h/o of AVF in RUE; swelling could possibly be d/t some venous outflow obstruction  - Does not appear to be cellulitis or lymphedema  - Pt occasionally found laying on R side; could be dependent   - Venous duplex RUE negative for DVT  - RUE edema improved after ACE wrap and elevation; CTM     #Chronic HTN  :: H/o multiple prior admissions for hypervolemia and hypertensive urgency   - Restarted home Hydralazine, Nifedipine, Coreg, and Indur  - Pt w/ episode of hypertensive urgency and flash pulmonary edema 5/11 afternoon with HA, SOB, severe ABD pain, nausea, and vomiting  - Ordered bedside EKG 5/11; not significantly changed from previous  - VBG wnl   - CTCAP negative for aortic dissection   - CTM BP    #Possible HIT  - Platelets decreased to 70 today; gradually downtrending over past 4 days after decreasing 46% post-heparin on day of admission  - ESRD could be contributing to platelet dysfunction  - Hold SubQ heparin   - Anti-platelet factor 4 antibody negative  - Platelets increased to 91 today   - SCDs for DVT prophylaxis      #DMII  :: A1C 7.7 3 months ago   :: Home regimen: insulin glargine 20 units daily and Humalog TID with meals: 100-199 2 units, 200-299 4 units, 300-399 6 units  - POCT glucose 120s-130s  - Glucose 129 on [facility] 5/14  - Continue basal Lantus 8 units daily  (lower dose d/t recent hypoglycemia)  - CTM Q4 hour POC glucose     #H/o head lice  - No active lice on scalp exam 5/13  - No need for isolation  - Still no evidence of active lice  - Does not need a second application of permethrin       VTE ppx: SCDs  Diet: Adult Renal   PPX: Pantoprozole 40 mg  Bowel regimen: -  Code Status: Full code   Contact Number: Amalia Enriquez (friend) [phone]  Dispo: TBSAMANTHA Mcpherson, MS4

## 2024-05-17 NOTE — PROGRESS NOTES
Subjective   Manolo Bashir is a 68 y.o. male reports his breathing is doing well but does mention his persistent cough now as well as couple episodes of nausea/vomiting.  Per report patient states he has had episodes of nausea/vomiting for past 6 months or so.  Has some mild abdominal discomfort.  Patient unsure last bowel movement, but does not think he is constipated.      Objective     Exam     Vitals:    05/17/24 0700 05/17/24 0754 05/17/24 1137 05/17/24 1241   BP: 153/68   166/69   Pulse: 79 77 90 101   Resp: 20   17   Temp:  36.3 °C (97.3 °F) 36.3 °C (97.3 °F) 36.9 °C (98.4 °F)   TempSrc:  Temporal Temporal    SpO2: 95%   (!) 88%   Weight:       Height:          Intake/Output last 3 shifts:  I/O last 3 completed shifts:  In: 1940 (25.6 mL/kg) [P.O.:240; I.V.:800 (10.6 mL/kg); Other:800; IV Piggyback:100]  Out: 4800 (63.4 mL/kg) [Other:4800]  Weight: 75.7 kg     Physical Exam  Vitals reviewed.   Constitutional:       General: He is not in acute distress.     Appearance: Normal appearance. He is not ill-appearing, toxic-appearing or diaphoretic.      Comments: Breathing smooth and calm.  Patient in bed on dialysis   HENT:      Head: Normocephalic and atraumatic.   Cardiovascular:      Rate and Rhythm: Normal rate and regular rhythm.      Pulses: Normal pulses.      Heart sounds: Murmur (2 out of 6 systolic murmur heard greatest over the left upper sternal border) heard.      No friction rub. No gallop.   Pulmonary:      Effort: Pulmonary effort is normal.      Breath sounds: No wheezing, rhonchi or rales.   Abdominal:      General: Bowel sounds are normal. There is no distension.      Palpations: Abdomen is soft.      Tenderness: There is no abdominal tenderness. There is no guarding or rebound.   Musculoskeletal:      Comments: Trace bilateral lower extremity edema.  Right forearm edema improving   Skin:     General: Skin is warm and dry.   Neurological:      General: No focal deficit present.      Mental  Status: He is alert.      Comments: Nonfocal   Psychiatric:         Mood and Affect: Mood normal.         Behavior: Behavior normal.            Medications   amoxicillin-pot clavulanate, 1 tablet, oral, q24h  aspirin, 81 mg, oral, Daily  [Held by provider] azaTHIOprine, 50 mg, oral, Daily  B complex-vitamin C-folic acid, 1 capsule, oral, Daily  calcitriol, 0.25 mcg, oral, Daily  carvedilol, 37.5 mg, oral, BID  cinacalcet, 30 mg, oral, Daily  epoetin aida or biosimilar, 150 Units/kg, subcutaneous, Weekly  [Held by provider] heparin (porcine), 5,000 Units, subcutaneous, q8h ZOË  hydrALAZINE, 100 mg, oral, q8h  insulin glargine, 8 Units, subcutaneous, q24h  insulin lispro, 0-5 Units, subcutaneous, TID  isosorbide mononitrate ER, 60 mg, oral, Daily  lidocaine, 1 patch, transdermal, Daily  metoclopramide, 5 mg, oral, BID  NIFEdipine ER, 60 mg, oral, BID  pantoprazole, 40 mg, oral, Daily before breakfast  pravastatin, 10 mg, oral, Nightly  predniSONE, 5 mg, oral, q AM  [START ON 5/18/2024] tacrolimus, 1 mg, oral, q AM   And  tacrolimus, 1 mg, oral, Nightly  tacrolimus, , Topical, BID       PRN medications: acetaminophen, bismuth subsalicylate, glucagon, glucagon, guaiFENesin, metoclopramide, ondansetron, ondansetron, oxyCODONE, oxygen, traMADol       Labs     All new labs reviewed:  some of the basic labs as follows -     Results from last 7 days   Lab Units 05/17/24  0546 05/16/24  0641 05/15/24  0546 05/14/24  0645   WBC AUTO x10*3/uL 3.2* 3.8* 3.3* 3.8*   HEMOGLOBIN g/dL 7.8* 7.9* 6.8* 7.1*   HEMATOCRIT % 24.5* 24.6* 20.7* 22.1*   PLATELETS AUTO x10*3/uL 91* 91* 70* 79*   NEUTROS PCT AUTO %  --  67.7 65.7 63.9   LYMPHO PCT MAN % 4.2  --   --   --    LYMPHS PCT AUTO %  --  11.2 14.7 14.5   MONO PCT MAN % 1.7  --   --   --    MONOS PCT AUTO %  --  13.9 12.2 14.2   EOSINO PCT MAN % 3.3  --   --   --    EOS PCT AUTO %  --  2.7 3.4 2.9          Results from last 72 hours   Lab Units 05/17/24  0546 05/16/24  0641  "05/15/24  0546   SODIUM mmol/L 137 136 135*   POTASSIUM mmol/L 3.7 3.7 3.6   CHLORIDE mmol/L 100 97* 97*   CO2 mmol/L 32 30 29   BUN mg/dL 26* 42* 37*   CREATININE mg/dL 4.93* 6.27* 5.73*     Results from last 72 hours   Lab Units 05/17/24  0546 05/16/24  0641 05/15/24  0546   ALK PHOS U/L 41 38 43   AST U/L 13 14 12   ALT U/L 14 15 16   BILIRUBIN TOTAL mg/dL 0.3 0.4 0.3   ALBUMIN g/dL 2.9* 2.8* 3.0*   PROTEIN TOTAL g/dL 5.4* 5.5* 5.9*   LIPASE U/L 3*  --   --      Results from last 72 hours   Lab Units 05/17/24  0546 05/16/24  0641 05/15/24  0546   GLUCOSE mg/dL 138* 126* 176*           No results found for: \"TR1\"  Lab Results   Component Value Date    URINECULTURE CANCELED 07/19/2023    BLOODCULT No growth at 2 days 05/14/2024    BLOODCULT No growth at 2 days 05/14/2024    BLOODCULT No growth at 4 days -  FINAL REPORT 10/19/2023    BLOODCULT No growth at 4 days -  FINAL REPORT 10/19/2023            Imaging   Electrocardiogram, 12-lead PRN ACS symptoms  Poor data quality, interpretation may be adversely affected  RegularÂ  Wide QRS rhythm  Left axis deviation  Nonspecific intraventricular block  Abnormal ECG  When compared with ECG of 14-MAY-2024 13:02,  Wide QRS rhythm has replaced Sinus rhythm  Confirmed by Kris Tran ([id number]) on 5/15/2024 7:54:11 AM     No results found for this or any previous visit from the past 1095 days.     Encounter Date: 05/11/24   Electrocardiogram, 12-lead PRN ACS symptoms   Result Value    Ventricular Rate 70    Atrial Rate 357    QRS Duration 150    QT Interval 412    QTC Calculation(Bazett) 444    R Axis -33    T Axis 260    QRS Count 12    Q Onset 215    T Offset 421    QTC Fredericia 433    Narrative    Poor data quality, interpretation may be adversely affected  RegularÂ  Wide QRS rhythm  Left axis deviation  Nonspecific intraventricular block  Abnormal ECG  When compared with ECG of 14-MAY-2024 13:02,  Wide QRS rhythm has replaced Sinus rhythm  Confirmed by Kris Tran ([id number]) " on 5/15/2024 7:54:11 AM        Assessment and Plan      Shortness of breath: Likely related to worsening volume overload in the setting of declining renal function.  Patient has had multiple admissions in the recent past for this same complaint.  Typically IV diuresis helps resolve his problems. Possible hap.  Patient had low-grade fever as well as increased respiratory rate after dialysis 5/14.  Complains of increased sinus congestion and productive cough  -Continue diuretics on nondialysis days per nephrology.  Patient has responded better to Bumex in the past  -Continue HD 3 times a week.  Arrange for outpatient dialysis chair still.  Likely to go to [facility] per his preference  -Continue antibiotics/Augmentin.  Overall appears to be doing well is afebrile.    -Guaifenesin/Tessalon Perles as needed  -Follow-up blood cultures   -pulmonary tioleting.  Incentive spirometer ~10x/hr while awake and flutter valve therapy     Renal transplant: With kidney failure back on dialysis  -continue immunosuppression regimen as directed by transplant service (tacrolimus and prednisone).  Will continue to hold azathioprine given fever  -Monitor tacrolimus level daily  -Continue home Sensipar and vitamin D3/calcitriol  -Follow-up transplant nephrology recommendations  -Monitor renal function panel  -hemodialysis per nephrology.    -Will establish outpatient hemodialysis chair with the aid of /TCC     Cardiovascular: Blood pressure markedly elevated in the emergency room.  Currently blood pressure doing much better and only mildly to moderately elevated  -Continue home carvedilol, hydralazine, nifedipine, and Imdur  -Continue statin and aspirin  -Compresses and elevation of the right arm.  No DVT seen on ultrasound portions of arm that are visible     Pancytopenia: Iron is low mildly elevated ferritin and low percent sat.  Folate and B12 are normal.  Hemoglobin with good incrementation status post 1 unit  -Monitor  CBC.    -Replete iron  -Epogen 10,000 units weekly     Diabetes mellitus: A1c from 1/24 is 7.7.  Patient presented with hypoglycemia in the setting of poor p.o. intake and vomiting with food he was taking in the setting of heavy coughing.  Patient does endorse he did continue to take his home insulin as directed despite alterations to oral intake.  Patient's blood glucoses are overall doing okay.  Patient was given 0 units of sliding scale insulin  -Continue Lantus.  We will titrate per corrective scale insulin replacement.    -Holding mealtime lispro at this time  -Continue corrective scale insulin     GI: Nausea vomiting appears to be somewhat chronic in nature.  Does not have any acute findings.  -Continue PPI  -Bowel care  -Check KUB for signs of constipation or other findings  -Checked LFTs and lipase.  LFTs are bland and lipase is low at 3  -Trial on Reglan.  Patient may benefit from outpatient gastric emptying study to assess for gastroparesis     DVT prophylaxis    Patient approaching discharge     Daniel Peña MD      Of note the above was done with Dragon dictation system.  Note was proofread to minimize errors.

## 2024-05-17 NOTE — NURSING NOTE
Report to Receiving RN:    Report To: Vilma ROLLINS   Time Report Called: [id number]  Hand-Off Communication: HD completed and tolerated well, no issues during treatment. Removed 2.5 L . Post /78 HR 90.   Complications During Treatment: No  Ultrafiltration Treatment: Yes  Medications Administered During Dialysis: No  Blood Products Administered During Dialysis: No  Labs Sent During Dialysis: No  Heparin Drip Rate Changes: N/A  Dialysis Catheter Dressing: N/A  Last Dressing Change: N/A

## 2024-05-18 ENCOUNTER — DOCUMENTATION (OUTPATIENT)
Dept: HOME HEALTH SERVICES | Facility: HOME HEALTH | Age: 69
End: 2024-05-18
Payer: COMMERCIAL

## 2024-05-18 ENCOUNTER — PHARMACY VISIT (OUTPATIENT)
Dept: PHARMACY | Facility: CLINIC | Age: 69
End: 2024-05-18
Payer: MEDICAID

## 2024-05-18 LAB
ALBUMIN SERPL BCP-MCNC: 2.8 G/DL (ref 3.4–5)
ALP SERPL-CCNC: 35 U/L (ref 33–136)
ALT SERPL W P-5'-P-CCNC: 12 U/L (ref 10–52)
ANION GAP SERPL CALC-SCNC: 7 MMOL/L (ref 10–20)
AST SERPL W P-5'-P-CCNC: 22 U/L (ref 9–39)
BACTERIA BLD CULT: NORMAL
BACTERIA BLD CULT: NORMAL
BASOPHILS # BLD MANUAL: 0 X10*3/UL (ref 0–0.1)
BASOPHILS NFR BLD MANUAL: 0 %
BILIRUB SERPL-MCNC: 0.3 MG/DL (ref 0–1.2)
BUN SERPL-MCNC: 16 MG/DL (ref 6–23)
CALCIUM SERPL-MCNC: 8.3 MG/DL (ref 8.6–10.6)
CHLORIDE SERPL-SCNC: 99 MMOL/L (ref 98–107)
CO2 SERPL-SCNC: 34 MMOL/L (ref 21–32)
CREAT SERPL-MCNC: 3.78 MG/DL (ref 0.5–1.3)
EGFRCR SERPLBLD CKD-EPI 2021: 17 ML/MIN/1.73M*2
EOSINOPHIL # BLD MANUAL: 0.04 X10*3/UL (ref 0–0.7)
EOSINOPHIL NFR BLD MANUAL: 1 %
ERYTHROCYTE [DISTWIDTH] IN BLOOD BY AUTOMATED COUNT: 14.1 % (ref 11.5–14.5)
GLUCOSE BLD MANUAL STRIP-MCNC: 115 MG/DL (ref 74–99)
GLUCOSE BLD MANUAL STRIP-MCNC: 133 MG/DL (ref 74–99)
GLUCOSE BLD MANUAL STRIP-MCNC: 137 MG/DL (ref 74–99)
GLUCOSE BLD MANUAL STRIP-MCNC: 154 MG/DL (ref 74–99)
GLUCOSE BLD MANUAL STRIP-MCNC: 166 MG/DL (ref 74–99)
GLUCOSE BLD MANUAL STRIP-MCNC: 244 MG/DL (ref 74–99)
GLUCOSE BLD MANUAL STRIP-MCNC: 256 MG/DL (ref 74–99)
GLUCOSE BLD MANUAL STRIP-MCNC: 70 MG/DL (ref 74–99)
GLUCOSE BLD MANUAL STRIP-MCNC: 70 MG/DL (ref 74–99)
GLUCOSE BLD MANUAL STRIP-MCNC: 88 MG/DL (ref 74–99)
GLUCOSE BLD MANUAL STRIP-MCNC: 96 MG/DL (ref 74–99)
GLUCOSE SERPL-MCNC: 73 MG/DL (ref 74–99)
HCT VFR BLD AUTO: 27.3 % (ref 41–52)
HGB BLD-MCNC: 8.7 G/DL (ref 13.5–17.5)
IMM GRANULOCYTES # BLD AUTO: 0.41 X10*3/UL (ref 0–0.7)
IMM GRANULOCYTES NFR BLD AUTO: 9.6 % (ref 0–0.9)
LYMPHOCYTES # BLD MANUAL: 0.52 X10*3/UL (ref 1.2–4.8)
LYMPHOCYTES NFR BLD MANUAL: 12 %
MCH RBC QN AUTO: 28.9 PG (ref 26–34)
MCHC RBC AUTO-ENTMCNC: 31.9 G/DL (ref 32–36)
MCV RBC AUTO: 91 FL (ref 80–100)
METAMYELOCYTES # BLD MANUAL: 0.04 X10*3/UL
METAMYELOCYTES NFR BLD MANUAL: 1 %
MONOCYTES # BLD MANUAL: 0.22 X10*3/UL (ref 0.1–1)
MONOCYTES NFR BLD MANUAL: 5 %
NEUTROPHILS # BLD MANUAL: 3.44 X10*3/UL (ref 1.2–7.7)
NEUTS BAND # BLD MANUAL: 0.26 X10*3/UL (ref 0–0.7)
NEUTS BAND NFR BLD MANUAL: 6 %
NEUTS SEG # BLD MANUAL: 3.18 X10*3/UL (ref 1.2–7)
NEUTS SEG NFR BLD MANUAL: 74 %
NRBC BLD-RTO: 0 /100 WBCS (ref 0–0)
PLATELET # BLD AUTO: 121 X10*3/UL (ref 150–450)
POTASSIUM SERPL-SCNC: 4.2 MMOL/L (ref 3.5–5.3)
PROT SERPL-MCNC: 5.5 G/DL (ref 6.4–8.2)
RBC # BLD AUTO: 3.01 X10*6/UL (ref 4.5–5.9)
RBC MORPH BLD: ABNORMAL
SCHISTOCYTES BLD QL SMEAR: ABNORMAL
SODIUM SERPL-SCNC: 136 MMOL/L (ref 136–145)
TACROLIMUS BLD-MCNC: 3.5 NG/ML
TOTAL CELLS COUNTED BLD: 100
VARIANT LYMPHS # BLD MANUAL: 0.04 X10*3/UL (ref 0–0.5)
VARIANT LYMPHS NFR BLD: 1 %
WBC # BLD AUTO: 4.3 X10*3/UL (ref 4.4–11.3)

## 2024-05-18 PROCEDURE — 2500000005 HC RX 250 GENERAL PHARMACY W/O HCPCS

## 2024-05-18 PROCEDURE — 2500000004 HC RX 250 GENERAL PHARMACY W/ HCPCS (ALT 636 FOR OP/ED): Performed by: STUDENT IN AN ORGANIZED HEALTH CARE EDUCATION/TRAINING PROGRAM

## 2024-05-18 PROCEDURE — 80053 COMPREHEN METABOLIC PANEL: CPT

## 2024-05-18 PROCEDURE — RXMED WILLOW AMBULATORY MEDICATION CHARGE

## 2024-05-18 PROCEDURE — 82947 ASSAY GLUCOSE BLOOD QUANT: CPT

## 2024-05-18 PROCEDURE — 85007 BL SMEAR W/DIFF WBC COUNT: CPT

## 2024-05-18 PROCEDURE — 2500000001 HC RX 250 WO HCPCS SELF ADMINISTERED DRUGS (ALT 637 FOR MEDICARE OP)

## 2024-05-18 PROCEDURE — 80197 ASSAY OF TACROLIMUS: CPT

## 2024-05-18 PROCEDURE — 36415 COLL VENOUS BLD VENIPUNCTURE: CPT

## 2024-05-18 PROCEDURE — 2500000002 HC RX 250 W HCPCS SELF ADMINISTERED DRUGS (ALT 637 FOR MEDICARE OP, ALT 636 FOR OP/ED): Performed by: STUDENT IN AN ORGANIZED HEALTH CARE EDUCATION/TRAINING PROGRAM

## 2024-05-18 PROCEDURE — 2500000001 HC RX 250 WO HCPCS SELF ADMINISTERED DRUGS (ALT 637 FOR MEDICARE OP): Performed by: STUDENT IN AN ORGANIZED HEALTH CARE EDUCATION/TRAINING PROGRAM

## 2024-05-18 PROCEDURE — 2500000006 HC RX 250 W HCPCS SELF ADMINISTERED DRUGS (ALT 637 FOR ALL PAYERS): Performed by: STUDENT IN AN ORGANIZED HEALTH CARE EDUCATION/TRAINING PROGRAM

## 2024-05-18 PROCEDURE — 1210000001 HC SEMI-PRIVATE ROOM DAILY

## 2024-05-18 PROCEDURE — 85027 COMPLETE CBC AUTOMATED: CPT

## 2024-05-18 PROCEDURE — 99232 SBSQ HOSP IP/OBS MODERATE 35: CPT | Performed by: INTERNAL MEDICINE

## 2024-05-18 PROCEDURE — 2500000002 HC RX 250 W HCPCS SELF ADMINISTERED DRUGS (ALT 637 FOR MEDICARE OP, ALT 636 FOR OP/ED)

## 2024-05-18 PROCEDURE — 2500000004 HC RX 250 GENERAL PHARMACY W/ HCPCS (ALT 636 FOR OP/ED)

## 2024-05-18 RX ORDER — INSULIN GLARGINE 100 [IU]/ML
4 INJECTION, SOLUTION SUBCUTANEOUS EVERY 24 HOURS
Status: DISCONTINUED | OUTPATIENT
Start: 2024-05-18 | End: 2024-05-19 | Stop reason: HOSPADM

## 2024-05-18 RX ORDER — GLUCAGON INJECTION, SOLUTION 1 MG/.2ML
1 INJECTION, SOLUTION SUBCUTANEOUS
Qty: 0.2 ML | Refills: 12 | Status: SHIPPED | OUTPATIENT
Start: 2024-05-18

## 2024-05-18 RX ORDER — BUMETANIDE 2 MG/1
2 TABLET ORAL
Qty: 16 TABLET | Refills: 0 | Status: SHIPPED | OUTPATIENT
Start: 2024-05-18 | End: 2024-06-17

## 2024-05-18 RX ORDER — LOPERAMIDE HYDROCHLORIDE 2 MG/1
2 CAPSULE ORAL 4 TIMES DAILY PRN
Status: DISCONTINUED | OUTPATIENT
Start: 2024-05-18 | End: 2024-05-19 | Stop reason: HOSPADM

## 2024-05-18 RX ORDER — INSULIN GLARGINE 100 [IU]/ML
8 INJECTION, SOLUTION SUBCUTANEOUS EVERY 24 HOURS
Start: 2024-05-19 | End: 2024-05-21

## 2024-05-18 RX ORDER — TACROLIMUS 1 MG/1
CAPSULE ORAL
Start: 2024-05-18 | End: 2024-06-17

## 2024-05-18 RX ORDER — LOPERAMIDE HYDROCHLORIDE 2 MG/1
2 CAPSULE ORAL 4 TIMES DAILY PRN
Qty: 30 CAPSULE | Refills: 0 | Status: SHIPPED | OUTPATIENT
Start: 2024-05-18

## 2024-05-18 RX ORDER — AMOXICILLIN AND CLAVULANATE POTASSIUM 500; 125 MG/1; MG/1
1 TABLET, FILM COATED ORAL EVERY 24 HOURS
Qty: 5 TABLET | Refills: 0 | Status: SHIPPED | OUTPATIENT
Start: 2024-05-19 | End: 2024-05-24

## 2024-05-18 RX ADMIN — LIDOCAINE 1 PATCH: 4 PATCH TOPICAL at 09:14

## 2024-05-18 RX ADMIN — AMOXICILLIN AND CLAVULANATE POTASSIUM 1 TABLET: 500; 125 TABLET, FILM COATED ORAL at 09:15

## 2024-05-18 RX ADMIN — HYDRALAZINE HYDROCHLORIDE 100 MG: 25 TABLET ORAL at 06:13

## 2024-05-18 RX ADMIN — CARVEDILOL 37.5 MG: 12.5 TABLET, FILM COATED ORAL at 21:45

## 2024-05-18 RX ADMIN — TACROLIMUS 1 MG: 1 CAPSULE ORAL at 17:47

## 2024-05-18 RX ADMIN — HYDRALAZINE HYDROCHLORIDE 100 MG: 25 TABLET ORAL at 12:21

## 2024-05-18 RX ADMIN — ASPIRIN 81 MG 81 MG: 81 TABLET ORAL at 09:14

## 2024-05-18 RX ADMIN — ACETAMINOPHEN 975 MG: 325 TABLET ORAL at 06:13

## 2024-05-18 RX ADMIN — HYDRALAZINE HYDROCHLORIDE 100 MG: 25 TABLET ORAL at 21:45

## 2024-05-18 RX ADMIN — PANTOPRAZOLE SODIUM 40 MG: 40 TABLET, DELAYED RELEASE ORAL at 06:13

## 2024-05-18 RX ADMIN — NIFEDIPINE 60 MG: 60 TABLET, FILM COATED, EXTENDED RELEASE ORAL at 21:45

## 2024-05-18 RX ADMIN — TACROLIMUS: 1 OINTMENT TOPICAL at 21:00

## 2024-05-18 RX ADMIN — DEXTROSE MONOHYDRATE 12.5 G: 25 INJECTION, SOLUTION INTRAVENOUS at 01:26

## 2024-05-18 RX ADMIN — METOCLOPRAMIDE 5 MG: 10 TABLET ORAL at 12:22

## 2024-05-18 RX ADMIN — OXYCODONE HYDROCHLORIDE 5 MG: 5 TABLET ORAL at 09:14

## 2024-05-18 RX ADMIN — NIFEDIPINE 60 MG: 60 TABLET, FILM COATED, EXTENDED RELEASE ORAL at 09:16

## 2024-05-18 RX ADMIN — TACROLIMUS 1 MG: 1 CAPSULE ORAL at 06:13

## 2024-05-18 RX ADMIN — PRAVASTATIN SODIUM 10 MG: 20 TABLET ORAL at 21:45

## 2024-05-18 RX ADMIN — CINACALCET 30 MG: 30 TABLET, FILM COATED ORAL at 09:14

## 2024-05-18 RX ADMIN — METOCLOPRAMIDE 5 MG: 10 TABLET ORAL at 21:47

## 2024-05-18 RX ADMIN — TACROLIMUS: 1 OINTMENT TOPICAL at 09:00

## 2024-05-18 RX ADMIN — PREDNISONE 5 MG: 5 TABLET ORAL at 09:14

## 2024-05-18 RX ADMIN — CALCITRIOL CAPSULES 0.25 MCG 0.25 MCG: 0.25 CAPSULE ORAL at 09:15

## 2024-05-18 RX ADMIN — INSULIN LISPRO 3 UNITS: 100 INJECTION, SOLUTION INTRAVENOUS; SUBCUTANEOUS at 17:47

## 2024-05-18 RX ADMIN — CARVEDILOL 37.5 MG: 12.5 TABLET, FILM COATED ORAL at 09:14

## 2024-05-18 RX ADMIN — ISOSORBIDE MONONITRATE 60 MG: 60 TABLET, EXTENDED RELEASE ORAL at 09:13

## 2024-05-18 RX ADMIN — ASCORBIC ACID, THIAMINE MONONITRATE,RIBOFLAVIN, NIACINAMIDE, PYRIDOXINE HYDROCHLORIDE, FOLIC ACID, CYANOCOBALAMIN, BIOTIN, CALCIUM PANTOTHENATE, 1 CAPSULE: 100; 1.5; 1.7; 20; 10; 1; 6000; 150000; 5 CAPSULE, LIQUID FILLED ORAL at 09:14

## 2024-05-18 RX ADMIN — DEXTROSE MONOHYDRATE 12.5 G: 25 INJECTION, SOLUTION INTRAVENOUS at 05:32

## 2024-05-18 ASSESSMENT — COGNITIVE AND FUNCTIONAL STATUS - GENERAL
DAILY ACTIVITIY SCORE: 18
TOILETING: A LITTLE
PERSONAL GROOMING: A LITTLE
MOBILITY SCORE: 22
DRESSING REGULAR UPPER BODY CLOTHING: A LITTLE
HELP NEEDED FOR BATHING: A LITTLE
WALKING IN HOSPITAL ROOM: A LITTLE
CLIMB 3 TO 5 STEPS WITH RAILING: A LITTLE
DRESSING REGULAR LOWER BODY CLOTHING: A LITTLE
EATING MEALS: A LITTLE

## 2024-05-18 ASSESSMENT — PAIN SCALES - GENERAL: PAINLEVEL_OUTOF10: 8

## 2024-05-18 NOTE — HH CARE COORDINATION
Home Care received a Referral for Physical Therapy and Occupational Therapy. We have processed the referral for a Start of Care on 5/19-5/20.     If you have any questions or concerns, please feel free to contact us at [phone]. Follow the prompts, enter your five digit zip code, and you will be directed to your care team on [facility].

## 2024-05-18 NOTE — CARE PLAN
Problem: Pain  Goal: My pain/discomfort is manageable  Outcome: Progressing     Problem: Safety  Goal: Patient will be injury free during hospitalization  Outcome: Progressing  Goal: I will remain free of falls  Outcome: Progressing     Problem: Daily Care  Goal: Daily care needs are met  Outcome: Progressing     Problem: Psychosocial Needs  Goal: Demonstrates ability to cope with hospitalization/illness  Outcome: Progressing  Goal: Collaborate with me, my family, and caregiver to identify my specific goals  Outcome: Progressing     Problem: Pain - Adult  Goal: Verbalizes/displays adequate comfort level or baseline comfort level  Outcome: Progressing     Problem: Safety - Adult  Goal: Free from fall injury  Outcome: Progressing     Problem: Diabetes  Goal: Achieve decreasing blood glucose levels by end of shift  Outcome: Progressing  Goal: Increase stability of blood glucose readings by end of shift  Outcome: Progressing  Goal: Decrease in ketones present in urine by end of shift  Outcome: Progressing  Goal: Maintain electrolyte levels within acceptable range throughout shift  Outcome: Progressing  Goal: Maintain glucose levels >70mg/dl to <250mg/dl throughout shift  Outcome: Progressing  Goal: No changes in neurological exam by end of shift  Outcome: Progressing  Goal: Learn about and adhere to nutrition recommendations by end of shift  Outcome: Progressing  Goal: Vital signs within normal range for age by end of shift  Outcome: Progressing  Goal: Increase self care and/or family involovement by end of shift  Outcome: Progressing  Goal: Receive DSME education by end of shift  Outcome: Progressing     Problem: Skin  Goal: Decreased wound size/increased tissue granulation at next dressing change  Outcome: Progressing  Goal: Participates in plan/prevention/treatment measures  Outcome: Progressing  Goal: Prevent/manage excess moisture  Outcome: Progressing  Goal: Prevent/minimize sheer/friction injuries  Outcome:  Progressing  Goal: Promote/optimize nutrition  Outcome: Progressing  Goal: Promote skin healing  Outcome: Progressing     Problem: Pain  Goal: Takes deep breaths with improved pain control throughout the shift  Outcome: Progressing  Goal: Turns in bed with improved pain control throughout the shift  Outcome: Progressing  Goal: Walks with improved pain control throughout the shift  Outcome: Progressing  Goal: Performs ADL's with improved pain control throughout shift  Outcome: Progressing  Goal: Participates in PT with improved pain control throughout the shift  Outcome: Progressing  Goal: Free from opioid side effects throughout the shift  Outcome: Progressing  Goal: Free from acute confusion related to pain meds throughout the shift  Outcome: Progressing   The patient's goals for the shift include      The clinical goals for the shift include Patient will main spO2>92%

## 2024-05-18 NOTE — SIGNIFICANT EVENT
Rapid Response RN Note    Rapid response RN for RADAR score 6 due to the recent VS listed below:     Vitals:    05/17/24 1137 05/17/24 1241 05/17/24 1700 05/17/24 1948   BP:  166/69  166/80   BP Location:    Right arm   Patient Position:    Sitting   Pulse: 90 101  96   Resp:  17  18   Temp: 36.3 °C (97.3 °F) 36.9 °C (98.4 °F)  37.2 °C (99 °F)   TempSrc: Temporal   Temporal   SpO2:  (!) 88% 92% 91%   Weight:       Height:            Reviewed above VS with bedside RN via phone.  Per RN, pt keeps taking oxygen off. No distress from pt.  Pt in bathroom at time of call.  No interventions by rapid response team indicated at this time.  Staff to page rapid response for any concerns or acute change in condition/VS.

## 2024-05-18 NOTE — PROGRESS NOTES
Per medical team pt is medically ready for discharge home today after dialysis.  MARRY Carrero confirmed pt has an HD chair schedule of TTS and is okay with this schedule until a MWF schedule opens up. MARRY to confirm chair time with pt prior to discharge. Secure chat sent to [facility] (PT/OT services) to notify of discharge home today for SOC. Pt has DME shower chair and FWW supplied by [facility] to discharge home with. Care coordinator will continue to follow for discharge planning needs.    Joyce López RN  Transitional Care Coordinator/TCC  [id number]

## 2024-05-18 NOTE — CARE PLAN
Problem: Skin  Goal: Decreased wound size/increased tissue granulation at next dressing change  5/18/2024 0940 by Vilma Suarez RN  Outcome: Progressing  Flowsheets (Taken 5/18/2024 0940)  Decreased wound size/increased tissue granulation at next dressing change:   Utilize specialty bed per algorithm   Protective dressings over bony prominences   Promote sleep for wound healing  5/18/2024 0939 by Vilma Suarez RN  Outcome: Progressing  Goal: Participates in plan/prevention/treatment measures  5/18/2024 0940 by Vilma Suarez RN  Outcome: Progressing  5/18/2024 0939 by Vilma Suarez RN  Outcome: Progressing  Goal: Prevent/manage excess moisture  5/18/2024 0940 by Vilma Suarez RN  Outcome: Progressing  5/18/2024 0939 by Vilma Suarez RN  Outcome: Progressing  Goal: Prevent/minimize sheer/friction injuries  5/18/2024 0940 by Vilma Suarez RN  Outcome: Progressing  5/18/2024 0939 by Vilma Suarez RN  Outcome: Progressing  Goal: Promote/optimize nutrition  5/18/2024 0940 by Vilma Suarez RN  Outcome: Progressing  5/18/2024 0939 by Vilma Suarez RN  Outcome: Progressing  Goal: Promote skin healing  5/18/2024 0940 by Vilma Suarez RN  Outcome: Progressing  5/18/2024 0939 by Vilma Suarez RN  Outcome: Progressing   The patient's goals for the shift include      The clinical goals for the shift include Patient will main spO2>92%

## 2024-05-18 NOTE — PROGRESS NOTES
"Kelvin Bashir is a 68 y.o. male on day 7 of admission presenting with Shortness of breath.    Subjective   Overnight, patient had a blood glucose of 22, which improved with a amp of dextrose. This morning, patient feels well. Patient's nausea is now resolved and patient was able to eat breakfast. Patient reported he only ate one meal yesterday (due to feeling nauseous), which he attributed to his hypoglycemic episode.        Objective     Physical Exam  Constitutional: No acute distress, awake, alert, conversant, resting comfortably in bed  Skin: Warm and well-perfused, no rashes/lesions on visible skin   HEENT: Normocephalic, atraumatic, EOMI, anicteric sclera  Cardiovascular: RRR  Pulmonary: Breathing comfortably on room air, CTAB, no increased work of breathing  Abdominal: Soft, NTND  Genitourinary: No indwelling fan   Psych: Pleasant mood and affect    Last Recorded Vitals  Blood pressure 151/70, pulse 79, temperature 37.1 °C (98.8 °F), resp. rate 18, height 1.727 m (5' 8\"), weight 75.7 kg (166 lb 12.8 oz), SpO2 94%.  Intake/Output last 3 Shifts:  I/O last 3 completed shifts:  In: 1400 (18.5 mL/kg) [I.V.:600 (7.9 mL/kg); Other:800]  Out: 5800 (76.7 mL/kg) [Other:5800]  Weight: 75.7 kg     Relevant Results      Vascular US upper extremity venous duplex right    Result Date: 5/18/2024            [place] [address]   Tel [phone] and Fax [phone]  Vascular Lab Report [facility] US UPPER EXTREMITY VENOUS DUPLEX RIGHT  Patient Name:     KELVIN BASHIR      Reading Physician: [id number] Tang Aguilar MD Study Date:       5/14/2024           Ordering           [id number] JEFFREY BIRD                                       Physician:         DIEGO MRN/PID:          [id number]            Technologist:      Fatimah Sim RVT,                                                          [facility] Accession#:       [id number]        Technologist 2: Date of           1955 / 68      " Encounter#:        [id number] Birth/Age:        years Gender:           M Admission Status: Inpatient           Location           [facility]                                       Performed:  Diagnosis/ICD: Right arm swelling-M79.89 CPT Codes:     51837 Peripheral venous duplex scan for DVT Limited  CONCLUSIONS: Right Upper Venous: No evidence of acute deep vein thrombus visualized in the right upper extremity. Left Upper Venous: The subclavian vein demonstrates a normal spontaneous and phasic flow.  Imaging & Doppler Findings:  Right               Compressible Thrombus        Flow Internal Jugular        Yes        None   Spontaneous/Phasic Subclavian              Yes        None Subclavian Proximal     Yes        None   Spontaneous/Phasic Subclavian Mid          Yes        None Subclavian Distal       Yes        None Axillary                Yes        None   Spontaneous/Phasic Brachial                Yes        None Cephalic                Yes        None Basilic                 Yes        None  Left                Thrombus        Flow Subclavian Proximal   None   Spontaneous/Phasic  [id number] Tang Aguilar MD Electronically signed by [id number] Aguilar MD on 5/18/2024 at 6:08:14 AM  ** Final **     XR abdomen 1 view    Result Date: 5/17/2024  Interpreted By:  Yohana Delgadillo, STUDY: XR ABDOMEN 1 VIEW;  5/17/2024 1:01 pm   INDICATION: Signs/Symptoms:Nausea vomiting.   COMPARISON: 09/22/2023   ACCESSION NUMBER(S): [id number]   ORDERING CLINICIAN: JEFFREY CORTEZ   FINDINGS: Nonobstructive bowel gas pattern. Limited evaluation of pneumoperitoneum on supine imaging, however no gross evidence of free air is noted.   Question right lower lobe infiltrate   Osseous structures demonstrate no acute bony changes.       1.  Nonspecific bowel gas pattern. 2. Question right lower lobe infiltrate.   MACRO: None   Signed by: Yohana Delgadillo 5/17/2024 3:12 PM Dictation workstation:   [id number]    Electrocardiogram, 12-lead PRN ACS  symptoms    Result Date: 5/15/2024   Poor data quality, interpretation may be adversely affected RegularÂ  Wide QRS rhythm Left axis deviation Nonspecific intraventricular block Abnormal ECG When compared with ECG of 14-MAY-2024 13:02, Wide QRS rhythm has replaced Sinus rhythm Confirmed by Kris Tran ([id number]) on 5/15/2024 7:54:11 AM    ECG 12 lead    Result Date: 5/12/2024  Normal sinus rhythm Minimal voltage criteria for LVH, may be normal variant ( Levar product ) Septal infarct , age undetermined Abnormal ECG When compared with ECG of 11-MAY-2024 00:24, Previous ECG has undetermined rhythm, needs review Left bundle branch block is no longer Present Septal infarct is now Present See ED provider note for full interpretation and clinical correlation Confirmed by Vangie Fuentes ([id number]) on 5/12/2024 12:09:41 AM    CT angio chest abdomen pelvis    Result Date: 5/11/2024  Interpreted By:  Octavio Henriquez and Meyers Emily STUDY: CT ANGIO CHEST ABDOMEN PELVIS;  5/11/2024 7:57 pm   INDICATION: Signs/Symptoms:rule out dissection.   COMPARISON: CT chest without contrast 04/03/2024, CT abdomen pelvis 11/15/2023   ACCESSION NUMBER(S): [id number]   ORDERING CLINICIAN: PEDRO RIGGS   TECHNIQUE: Axial non-contrast images of the chest abdomen, and pelvis.   Axial CT images of the chest, abdomen and pelvis were obtained after the intravenous administration of iodinated contrast using angiographic technique with coronal and sagittal reformatted images. MIP images and 3D reconstructions were created on an independent workstation and reviewed.   FINDINGS: VASCULATURE:   PULMONARY ARTERIES: Pulmonary artery is dilated measuring up to 3.4 cm, similar when compared to prior exam. No acute pulmonary embolism.   THORACIC AORTA: Non-contrast images show no evidence of acute intramural hematoma. Conventional three-vessel aortic arch. No thoracic aortic aneurysm or dissection. Mild thoracic aortic atherosclerosis.   ABDOMINAL AORTA: No  abdominal aortic aneurysm or dissection. Mild abdominal aortic atherosclerosis.   ABDOMINAL AND PELVIC ARTERIES: The celiac artery, superior mesenteric artery, and inferior mesenteric artery are patent without significant stenosis or occlusion. There is moderate to severe atherosclerotic calcifications of the bilateral internal iliac arteries, though patent. Severe atherosclerotic disease of the gonadal arteries.     CT CHEST:   MEDIASTINUM AND LYMPH NODES: Small hiatal hernia. The esophageal wall appears within normal limits. Prominent left axillary lymph nodes with the largest measuring up to 1.0 cm in short axis (series 501, image 75). No pneumomediastinum.   HEART: Cardiomegaly. No coronary artery calcifications. No significant pericardial effusion.   LUNG, PLEURA, LARGE AIRWAYS: Multifocal consolidative airspace opacities diffusely throughout the right-greater-than-left lungs. Right-greater-than-left moderate pleural effusions with associated atelectasis and near-complete collapse of the right lower lobe. No pneumothorax.   OSSEOUS STRUCTURES: Multilevel degenerative change throughout the thoracolumbar spine, most notably at T10-T11 with loss of intervertebral body disc height, adjacent subchondral cystic change, and anterior osteophytosis, not significantly changed when compared to prior exam. In addition, there is a sclerotic focus within the spinous process of T11, similar when compared to prior exam, and again likely representing a bone island.   CHEST WALL SOFT TISSUES: No discernible abnormality.     CT ABDOMEN/PELVIS:   ABDOMINAL WALL: Generalized body wall edema.   LIVER: The liver is normal in size and enhancement. There is mild caudate lobe hypertrophy.   BILE DUCTS: No significant intrahepatic or extrahepatic dilatation.   GALLBLADDER: Postsurgical change of cholecystectomy.   PANCREAS: No significant abnormality.   SPLEEN: Upper limits of normal   ADRENALS: No significant abnormality.   KIDNEYS,  URETERS, BLADDER: Bilateral native kidneys are severely atrophic. There is a left iliac fossa renal transplant without associated perinephric fluid collection or hydroureteronephrosis. Bladder is partially decompressed, limited for evaluation CT evidence of disproportionate degree of bladder wall thickening.   REPRODUCTIVE ORGANS: No significant abnormality.   VESSELS: (See above). Aneurysmal dilatation of the splenic vein. There is severe narrowing of the left subclavian vein at the level of the thoracic inlet (series 501, image 66). An occluded vascular stent is noted within the region of the axillary vein.   RETROPERITONEUM/LYMPH NODES: No enlarged lymph nodes. No acute retroperitoneal abnormality.   BOWEL/MESENTERY/PERITONEUM: Small hiatal hernia. Otherwise, stomach is unremarkable in appearance. No inflammatory bowel wall thickening or dilatation. Redemonstration of postsurgical change of partial colectomy. Appendix is not well definitively visualized.   Small amount of free fluid within the pelvis, similar when compared to prior exam. Otherwise, no free air or loculated fluid collection.     OSSEOUS STRUCTURES: No acute osseous abnormality. Multilevel degenerative change, again most notably at T10-T11 with loss of intervertebral body disc height, associated subchondral cystic change, and osteophytosis, again not significantly changed when compared to prior exam. There is a sclerotic focus noted within the sacrum at the level of S2, similar when compared to prior exam, and again likely representing a bone island.       1. No thoracic or abdominal aortic aneurysm or dissection. 2. Multifocal consolidative airspace opacities throughout the right-greater-than-left lungs, likely representative of fairly pronounced pulmonary edema. There certainly could be concomitant pneumonia 3. Right-greater-than-left moderate pleural effusions with near-complete collapse of the right lower lobe. 4. Pulmonary artery dilatation  measuring up to 3.4 cm, similar when compared to prior exam. Recommend correlation with pulmonary artery hypertension. 5. Bilateral native renal atrophy with unremarkable appearing left iliac fossa renal transplant. 6. 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 7. Additional findings as detailed above.   I personally reviewed the images/study, and I agree with the findings as stated above. This study was interpreted at [facility], [place], [place].   MACRO: None.   Signed by: Octavio Henriquez 5/11/2024 9:08 PM Dictation workstation:   [id number]    XR chest 2 views    Result Date: 5/11/2024  Interpreted By:  Octavio Henriquez and Fu Tianyuan STUDY: XR CHEST 2 VIEWS;  5/11/2024 1:15 am   INDICATION: Signs/Symptoms:sob, cp.   COMPARISON: Chest radiograph 04/03/2024.   ACCESSION NUMBER(S): [id number]   ORDERING CLINICIAN: ENIO MORALES   FINDINGS: PA and lateral radiographs of the chest were provided.   A vascular stent is again noted overlying the left axilla.   CARDIOMEDIASTINAL SILHOUETTE: Cardiomediastinal silhouette is stable in size and configuration, moderately enlarged.   LUNGS: There are hazy and dense opacities over the right mid lung and bilateral lung bases with blunting of the costophrenic angles. Prominence of interstitial and perihilar markings is noted. No evidence of pneumothorax.   ABDOMEN: No remarkable upper abdominal findings.   BONES: No acute osseous changes.       1. Bilateral pleural effusions with bibasilar atelectasis or consolidation, increased from previous radiograph on 04/03/2024. 2. Prominent interstitial and perihilar markings may reflect component of pulmonary edema. 3. Unchanged moderately enlarged cardiomediastinal silhouette.   I personally reviewed the images/study and I agree with the findings as stated by resident physician Gayathri Pond MD.  This study was interpreted at [place], [place].   MACRO: None   Signed by: Octavio Henriquez 5/11/2024 4:18 AM Dictation workstation:   [id number]    MR cardiac w and wo IV contrast w regadenoson stress for MORPH FUNCT and valve DZ    Result Date: 4/30/2024  Interpreted By:  Luis Collado  and Gael Jacobson STUDY: MR CARDIAC W AND WO IV CONTRAST W REGADENOSON STRESS FOR MORPH/FUNCT AND VALVE DZ;  4/30/2024 2:12 pm   INDICATION: Signs/Symptoms:Chest pain. History of hypertension and end-stage renal disease.   COMPARISON: Transthoracic echocardiogram 04/05/2024. CT chest 04/03/2024.   ACCESSION NUMBER(S): [id number]   ORDERING CLINICIAN: KIERRA KILPATRICK   TECHNIQUE: Siemens1.5  Sarah MRI scanner. Turbo spin echo and balanced steady state free precession (bSSFP) imaging for anatomic definition. Dynamic cine bSSFP for cardiac chamber and wall-motion analysis, and valvular analysis. Flow quantification sequences for hemodynamics. Delayed gadolinium enhancement analysis after injection of gadolinium-chelate (31 mL Dotarem, 0.2 mmol/kg).   Images were obtained at rest and post stress.   FINDINGS: CARDIAC CHAMBERS Normal atrioventricular and ventriculoarterial concordance   LEFT ATRIUM Dilated (Area-35.7 cm2)   RIGHT ATRIUM Dilated (Area-31.8 cm2)   INTERATRIAL SEPTUM Intact.   LEFT VENTRICLE The left ventricle is dilated and has overall low normal global systolic function. There are no segmental wall motion abnormalities. Circumferential thickening of the myocardium at the base measuring up to 1.7 cm in the mid septum. Quantitative left ventricular functional values are as follows: EDV = 278 cc; EDVi = 147 cc/m2 ESV = 127 cc; ESVi = 67 cc/m2 Stroke volume = 151 cc; SVi = 80 cc/m2 LVEF = 54 % Absolute Cardiac Output = 8.77 l/min.; COi = 4.61 l/min/m2 LV mass = 191 gm; LVMi = 101 gm/m2   *Hany ROSE et al. Normalized left ventricular systolic and diastolic function by steady  state free precession cardiovascular magnetic resonance. J Cardiovasc Magn Reson 2006; 8:417-26.   Questionable linear mid myocardial delayed enhancement involving the basal inferolateral wall on both short axis and 3 chamber sequences. There is also questionable thin mid myocardial delayed enhancement within the basal septum seen on the short axis images which is less conspicuous on the other planes, favored to be artifactual although a small area of nonischemic scarring could appear similar.   Normal myocardial perfusion at rest and post stress without evidence of inducible ischemia.   The myocardial T1 and T2 values are within limits.   No evidence of left ventricular thrombus.   RIGHT VENTRICLE The right ventricle appears normal in size, shape, and has normal qualitative systolic function. No segmental wall motion abnormalities. No abnormal delayed enhancement in the myocardium.   INTERVENTRICULAR SEPTUM Intact.   AORTIC VALVE There is  no aortic regurgitation. Flow quantification through the ascending aorta: Forward volume =147 cc/beat Reverse volume = 0 cc/beat Net forward volume = 147 cc/beat Aortic regurgitant fraction = 0 %   MITRAL VALVE There is qualitative mild mitral regurgitation.   There is  no significant quantified mitral regurgitation. Integrating LV volumetric and aortic flow quantification data reveals: Quantitative mitral regurgitant volume = 4 cc/beat Quantitative mitral regurgitant fraction = 3 %   TRICUSPID VALVE There is qualitative mild tricuspid regurgitation.   THORACIC AORTA The thoracic aorta appears normal in course, caliber, and contour. There is no evidence for acute aortic pathology. The arch vessel branching pattern is  normal.   All the arch branch vessels appear widely patent in their proximal portions.   PULMONARY ARTERIES The main pulmonary artery caliber is on the upper limits of normal, 3.2 cm in diameter.   SYSTEMIC AND PULMONARY VEINS Normal systemic venous and pulmonary  venous return. The SVC and IVC are of normal caliber. Normal pulmonary venous anatomy.   CHEST The chest wall is normal. No significant lymphadenopathy or mass is seen in limited images of the mediastinum. Moderate right and small left pleural effusion, similar to recent CT chest 04/03/2024. Areas of signal abnormality within the lung fields may represent edema or infection.   UPPER ABDOMEN Limited imaging through the upper abdomen reveals no abnormalities of the visualized organs.       1. The left ventricle is dilated (EDVi-147 ml/m2) and has overall low normal global systolic function(LVEF-54 %).  There are no major wall motion abnormalities. 2. Normal myocardial perfusion at rest and post stress without evidence of inducible ischemia. 3. Linear mid myocardial delayed enhancement within the basal inferolateral wall in a nonischemic pattern. Questionable thin mid myocardial delayed enhancement within the basal septum on the short axis images is less conspicuous on the other imaging planes and is favored to be artifactual, although a small area of nonischemic scarring could appear similar. 4. Circumferential left ventricular hypertrophy, most prominent in the base. 5. Mild qualitative mitral regurgitation. Regurgitant fraction = 3%. 6. Biatrial dilatation. 7. Moderate right and small left pleural effusion, similar to recent CT chest 04/03/2024. Areas of signal abnormality within the lung fields may represent edema or infection. 8. Borderline dilated main pulmonary artery which can be seen with pulmonary hypertension.   Signed by: Luis Collado 4/30/2024 4:55 PM Dictation workstation:   [id number]    * Cannot find OR log *  Last relevant procedure:                          Assessment/Plan   Principal Problem:    Shortness of breath    69 y/o M with ESRD s/p renal transplant x2, prostate cancer s/p radical prostatectomy, DM2, MGUS, HTN who presented with volume overload and treated for PNA.     Updates:   - Plan to  discharge today with Tuesday, Thursday, Saturday dialysis (pending confirmation of HD chair for Tuesday)  - Nausea resolved, and patient was able to tolerate breakfast this morning  - Discontinued azathioprine, per nephrology  - Discharging on  tacrolimus 1mg BID, prednisone 5mg daily, and bumex 2mg on non-dialysis days   - Discharging with 5 additional days of augmentin to complete 10-day course for pneumonia treatment    #Nausea and vomiting [resolved]  - KUB 5/17 showed nonspecific bowel gas pattern  - Pt no longer nauseous or vomiting in the afternoon; received Reglan 5 mg at 2 PM  - Start Reglan 10 mg TID PRN N/V d/t concern for gastroparesis  - Continue Zofran and Reglan 5 mg PRN      #ESRD s/p renal transplant (X2)  #Hypervolemia  :: Renal transplants in 1992 and 2013  ::B/l Cr 5.5  :: Dialysis access created 3/2024 in LUE with palpable thrill   :: TTE 4/2024 EF 54%  - Transplant nephrology following   - CTAP w/ R>L pulmonary edema and R>L moderate pleural effusions with near-complete collapse of the right lower lobe.   - Crackles resolved on exam; BLE edema improved  - Continue home Nifedipine, Hydralazine, Coreg and Indur  - Consider thoracentesis if SOB does not improve with diuresis/dialysis  - Hold aziathroprine per transplant nephrology recs  - Hold Bactrim PJP PPX;  not recommended at this time    - Received dialysis today; MWF schedule outpatient   - Bumex 3mg on non-dialysis days per nephrology PRN volume overload  - Goal tacrolimus level to be determined d/t starting HD  - Continue tacrolimus 1 mg BID and prednisone 5 mg daily for now per nephrology; pt will gradually be tapered down from immunosuppressants in setting of failed kidney transplant  - Hgb 7.8 5/17  - Continue home epoetin aida injection 10,000 units weekly   - Strict I&Os  - Daily weights      #Hospital Acquired Pneumonia  ::CXR 5/11: There are hazy and dense opacities over the right mid lung and bilateral lung bases with blunting of  the costophrenic angles.   1. Bilateral pleural effusions with bibasilar atelectasis or consolidation, increased from previous radiograph on 04/03/2024. 2. Prominent interstitial and perihilar markings may reflect component of pulmonary edema.   - 5/14 Pt developed low grade fever to 100.6 and tachypnea - Continued to sat well on 2-4L O2. Later developed nausea and vomiting, which later resolved  - Pt remains afebrile  - Continue ABX (Augmentin)  - Repeat EKG unchanged from previous  - Peripheral blood cultures with no growth at 2 days  - Respiratory viral panel negative   - Continue holding aziathoprine   - Continue incentive spirometry 10 times per hour while awake   - Wean O2 as tolerated     #RUE swelling  - Right upper extremity w/ 3+ pitting edema, nonerythematous, and nontender to palpation  - No IV medication has gone through that arm per nursing; IV was not infiltrated  - Remote h/o of AVF in RUE; swelling could possibly be d/t some venous outflow obstruction  - Does not appear to be cellulitis or lymphedema  - Pt occasionally found laying on R side; could be dependent   - Venous duplex RUE negative for DVT  - RUE edema improved after ACE wrap and elevation; CTM     #Chronic HTN  :: H/o multiple prior admissions for hypervolemia and hypertensive urgency   - Restarted home Hydralazine, Nifedipine, Coreg, and Indur  - Pt w/ episode of hypertensive urgency and flash pulmonary edema 5/11 afternoon with HA, SOB, severe ABD pain, nausea, and vomiting  - Ordered bedside EKG 5/11; not significantly changed from previous  - VBG wnl   - CTCAP negative for aortic dissection   - CTM BP     #Possible HIT  - Platelets decreased to 70 today; gradually downtrending over past 4 days after decreasing 46% post-heparin on day of admission  - ESRD could be contributing to platelet dysfunction  - Hold SubQ heparin   - Anti-platelet factor 4 antibody negative  - Platelets increased to 91 today   - SCDs for DVT prophylaxis       #DMII  :: A1C 7.7 3 months ago   :: Home regimen: insulin glargine 20 units daily and Humalog TID with meals: 100-199 2 units, 200-299 4 units, 300-399 6 units  - POCT glucose 120s-130s  - Glucose 129 on CMP 5/14  - Continue basal Lantus 8 units daily (lower dose d/t recent hypoglycemia)  - CTM Q4 hour POC glucose     #H/o head lice  - No active lice on scalp exam 5/13  - No need for isolation  - Still no evidence of active lice  - Does not need a second application of permethrin         VTE ppx: SCDs  Diet: Adult Renal   PPX: Pantoprozole 40 mg  Bowel regimen: -  Code Status: Full code   Contact Number: Amalia Enriquez (friend) [phone]  Dispo: EZEKIEL Nance MD

## 2024-05-19 ENCOUNTER — APPOINTMENT (OUTPATIENT)
Dept: RADIOLOGY | Facility: HOSPITAL | Age: 69
End: 2024-05-19
Payer: COMMERCIAL

## 2024-05-19 VITALS
OXYGEN SATURATION: 95 % | SYSTOLIC BLOOD PRESSURE: 126 MMHG | RESPIRATION RATE: 21 BRPM | HEIGHT: 68 IN | DIASTOLIC BLOOD PRESSURE: 62 MMHG | HEART RATE: 64 BPM | BODY MASS INDEX: 25.28 KG/M2 | TEMPERATURE: 99.1 F | WEIGHT: 166.8 LBS

## 2024-05-19 LAB
ALBUMIN SERPL BCP-MCNC: 2.9 G/DL (ref 3.4–5)
ALP SERPL-CCNC: 42 U/L (ref 33–136)
ALT SERPL W P-5'-P-CCNC: 14 U/L (ref 10–52)
ANION GAP SERPL CALC-SCNC: 11 MMOL/L (ref 10–20)
AST SERPL W P-5'-P-CCNC: 22 U/L (ref 9–39)
BASOPHILS # BLD AUTO: 0.01 X10*3/UL (ref 0–0.1)
BASOPHILS NFR BLD AUTO: 0.3 %
BILIRUB SERPL-MCNC: 0.2 MG/DL (ref 0–1.2)
BUN SERPL-MCNC: 22 MG/DL (ref 6–23)
CALCIUM SERPL-MCNC: 8.8 MG/DL (ref 8.6–10.6)
CHLORIDE SERPL-SCNC: 99 MMOL/L (ref 98–107)
CO2 SERPL-SCNC: 30 MMOL/L (ref 21–32)
CREAT SERPL-MCNC: 4.88 MG/DL (ref 0.5–1.3)
EGFRCR SERPLBLD CKD-EPI 2021: 12 ML/MIN/1.73M*2
EOSINOPHIL # BLD AUTO: 0.11 X10*3/UL (ref 0–0.7)
EOSINOPHIL NFR BLD AUTO: 3.5 %
ERYTHROCYTE [DISTWIDTH] IN BLOOD BY AUTOMATED COUNT: 13.7 % (ref 11.5–14.5)
GLUCOSE BLD MANUAL STRIP-MCNC: 165 MG/DL (ref 74–99)
GLUCOSE BLD MANUAL STRIP-MCNC: 168 MG/DL (ref 74–99)
GLUCOSE SERPL-MCNC: 185 MG/DL (ref 74–99)
HCT VFR BLD AUTO: 25 % (ref 41–52)
HGB BLD-MCNC: 7.6 G/DL (ref 13.5–17.5)
IMM GRANULOCYTES # BLD AUTO: 0.18 X10*3/UL (ref 0–0.7)
IMM GRANULOCYTES NFR BLD AUTO: 5.7 % (ref 0–0.9)
LYMPHOCYTES # BLD AUTO: 0.63 X10*3/UL (ref 1.2–4.8)
LYMPHOCYTES NFR BLD AUTO: 19.9 %
MCH RBC QN AUTO: 27.6 PG (ref 26–34)
MCHC RBC AUTO-ENTMCNC: 30.4 G/DL (ref 32–36)
MCV RBC AUTO: 91 FL (ref 80–100)
MONOCYTES # BLD AUTO: 0.33 X10*3/UL (ref 0.1–1)
MONOCYTES NFR BLD AUTO: 10.4 %
NEUTROPHILS # BLD AUTO: 1.91 X10*3/UL (ref 1.2–7.7)
NEUTROPHILS NFR BLD AUTO: 60.2 %
NRBC BLD-RTO: 0 /100 WBCS (ref 0–0)
OVALOCYTES BLD QL SMEAR: NORMAL
PLATELET # BLD AUTO: 101 X10*3/UL (ref 150–450)
POTASSIUM SERPL-SCNC: 4.4 MMOL/L (ref 3.5–5.3)
PROT SERPL-MCNC: 5.6 G/DL (ref 6.4–8.2)
RBC # BLD AUTO: 2.75 X10*6/UL (ref 4.5–5.9)
RBC MORPH BLD: NORMAL
SCHISTOCYTES BLD QL SMEAR: NORMAL
SODIUM SERPL-SCNC: 136 MMOL/L (ref 136–145)
TACROLIMUS BLD-MCNC: 2.6 NG/ML
WBC # BLD AUTO: 3.2 X10*3/UL (ref 4.4–11.3)

## 2024-05-19 PROCEDURE — 85025 COMPLETE CBC W/AUTO DIFF WBC: CPT

## 2024-05-19 PROCEDURE — 82947 ASSAY GLUCOSE BLOOD QUANT: CPT

## 2024-05-19 PROCEDURE — 71045 X-RAY EXAM CHEST 1 VIEW: CPT

## 2024-05-19 PROCEDURE — 2500000004 HC RX 250 GENERAL PHARMACY W/ HCPCS (ALT 636 FOR OP/ED): Performed by: STUDENT IN AN ORGANIZED HEALTH CARE EDUCATION/TRAINING PROGRAM

## 2024-05-19 PROCEDURE — 2500000005 HC RX 250 GENERAL PHARMACY W/O HCPCS

## 2024-05-19 PROCEDURE — 2500000001 HC RX 250 WO HCPCS SELF ADMINISTERED DRUGS (ALT 637 FOR MEDICARE OP)

## 2024-05-19 PROCEDURE — 71045 X-RAY EXAM CHEST 1 VIEW: CPT | Performed by: STUDENT IN AN ORGANIZED HEALTH CARE EDUCATION/TRAINING PROGRAM

## 2024-05-19 PROCEDURE — 2500000002 HC RX 250 W HCPCS SELF ADMINISTERED DRUGS (ALT 637 FOR MEDICARE OP, ALT 636 FOR OP/ED): Performed by: STUDENT IN AN ORGANIZED HEALTH CARE EDUCATION/TRAINING PROGRAM

## 2024-05-19 PROCEDURE — 99238 HOSP IP/OBS DSCHRG MGMT 30/<: CPT | Performed by: INTERNAL MEDICINE

## 2024-05-19 PROCEDURE — 2500000004 HC RX 250 GENERAL PHARMACY W/ HCPCS (ALT 636 FOR OP/ED)

## 2024-05-19 PROCEDURE — 80197 ASSAY OF TACROLIMUS: CPT

## 2024-05-19 PROCEDURE — 2500000001 HC RX 250 WO HCPCS SELF ADMINISTERED DRUGS (ALT 637 FOR MEDICARE OP): Performed by: STUDENT IN AN ORGANIZED HEALTH CARE EDUCATION/TRAINING PROGRAM

## 2024-05-19 PROCEDURE — 84075 ASSAY ALKALINE PHOSPHATASE: CPT

## 2024-05-19 PROCEDURE — 2500000006 HC RX 250 W HCPCS SELF ADMINISTERED DRUGS (ALT 637 FOR ALL PAYERS): Performed by: STUDENT IN AN ORGANIZED HEALTH CARE EDUCATION/TRAINING PROGRAM

## 2024-05-19 PROCEDURE — 36415 COLL VENOUS BLD VENIPUNCTURE: CPT

## 2024-05-19 RX ORDER — BUMETANIDE 2 MG/1
2 TABLET ORAL ONCE
Qty: 1 TABLET | Refills: 0 | Status: DISCONTINUED | OUTPATIENT
Start: 2024-05-19 | End: 2024-05-19 | Stop reason: HOSPADM

## 2024-05-19 RX ADMIN — TRAMADOL HYDROCHLORIDE 50 MG: 50 TABLET, COATED ORAL at 10:07

## 2024-05-19 RX ADMIN — CALCITRIOL CAPSULES 0.25 MCG 0.25 MCG: 0.25 CAPSULE ORAL at 10:07

## 2024-05-19 RX ADMIN — NIFEDIPINE 60 MG: 60 TABLET, FILM COATED, EXTENDED RELEASE ORAL at 10:07

## 2024-05-19 RX ADMIN — PANTOPRAZOLE SODIUM 40 MG: 40 TABLET, DELAYED RELEASE ORAL at 05:20

## 2024-05-19 RX ADMIN — ASPIRIN 81 MG 81 MG: 81 TABLET ORAL at 10:07

## 2024-05-19 RX ADMIN — CARVEDILOL 37.5 MG: 12.5 TABLET, FILM COATED ORAL at 10:07

## 2024-05-19 RX ADMIN — AMOXICILLIN AND CLAVULANATE POTASSIUM 1 TABLET: 500; 125 TABLET, FILM COATED ORAL at 10:06

## 2024-05-19 RX ADMIN — TACROLIMUS 1 MG: 1 CAPSULE ORAL at 05:21

## 2024-05-19 RX ADMIN — LIDOCAINE 1 PATCH: 4 PATCH TOPICAL at 10:07

## 2024-05-19 RX ADMIN — ISOSORBIDE MONONITRATE 60 MG: 60 TABLET, EXTENDED RELEASE ORAL at 10:07

## 2024-05-19 RX ADMIN — CINACALCET 30 MG: 30 TABLET, FILM COATED ORAL at 10:06

## 2024-05-19 RX ADMIN — ASCORBIC ACID, THIAMINE MONONITRATE,RIBOFLAVIN, NIACINAMIDE, PYRIDOXINE HYDROCHLORIDE, FOLIC ACID, CYANOCOBALAMIN, BIOTIN, CALCIUM PANTOTHENATE, 1 CAPSULE: 100; 1.5; 1.7; 20; 10; 1; 6000; 150000; 5 CAPSULE, LIQUID FILLED ORAL at 10:07

## 2024-05-19 RX ADMIN — METOCLOPRAMIDE 5 MG: 10 TABLET ORAL at 10:07

## 2024-05-19 RX ADMIN — HYDRALAZINE HYDROCHLORIDE 100 MG: 25 TABLET ORAL at 05:18

## 2024-05-19 RX ADMIN — PREDNISONE 5 MG: 5 TABLET ORAL at 10:07

## 2024-05-19 RX ADMIN — TACROLIMUS: 1 OINTMENT TOPICAL at 10:14

## 2024-05-19 RX ADMIN — OXYCODONE HYDROCHLORIDE 5 MG: 5 TABLET ORAL at 01:42

## 2024-05-19 ASSESSMENT — PAIN - FUNCTIONAL ASSESSMENT
PAIN_FUNCTIONAL_ASSESSMENT: 0-10
PAIN_FUNCTIONAL_ASSESSMENT: 0-10

## 2024-05-19 ASSESSMENT — COGNITIVE AND FUNCTIONAL STATUS - GENERAL
DRESSING REGULAR LOWER BODY CLOTHING: A LITTLE
WALKING IN HOSPITAL ROOM: A LITTLE
MOBILITY SCORE: 20
DRESSING REGULAR UPPER BODY CLOTHING: A LITTLE
MOVING TO AND FROM BED TO CHAIR: A LITTLE
STANDING UP FROM CHAIR USING ARMS: A LITTLE
DRESSING REGULAR LOWER BODY CLOTHING: A LITTLE
PERSONAL GROOMING: A LITTLE
CLIMB 3 TO 5 STEPS WITH RAILING: A LOT
DAILY ACTIVITIY SCORE: 17
EATING MEALS: A LITTLE
DRESSING REGULAR UPPER BODY CLOTHING: A LITTLE
HELP NEEDED FOR BATHING: A LOT
TOILETING: A LITTLE
STANDING UP FROM CHAIR USING ARMS: A LITTLE
MOBILITY SCORE: 20
CLIMB 3 TO 5 STEPS WITH RAILING: A LITTLE
WALKING IN HOSPITAL ROOM: A LITTLE
DAILY ACTIVITIY SCORE: 22

## 2024-05-19 ASSESSMENT — PAIN DESCRIPTION - DESCRIPTORS: DESCRIPTORS: ACHING;DISCOMFORT

## 2024-05-19 ASSESSMENT — PAIN SCALES - GENERAL
PAINLEVEL_OUTOF10: 10 - WORST POSSIBLE PAIN
PAINLEVEL_OUTOF10: 0 - NO PAIN

## 2024-05-19 NOTE — NURSING NOTE
Discharge Note 05/19/24 at 1609: Patient discharged from [place] [address] at 1527. Patient discharged to home via private car with family. Patient's IV removed prior to discharge. Discharge instructions reviewed with patient's family and patient given meds to bed.

## 2024-05-19 NOTE — PROGRESS NOTES
"Manolo Bashir is a 68 y.o. male on day 8 of admission presenting with Shortness of breath.    Subjective   Pt resting comfortably on 2L O2. Had no episodes of hypoglycemia, hypoxia, or N/V overnight or this morning. C/o continued dry cough that is concerning to him. He is eager to go home.        Objective     Physical Exam  Constitutional:       General: He is not in acute distress.     Appearance: Normal appearance.   HENT:      Head: Normocephalic and atraumatic.   Eyes:      Extraocular Movements: Extraocular movements intact.      Pupils: Pupils are equal, round, and reactive to light.   Cardiovascular:      Rate and Rhythm: Normal rate and regular rhythm.   Pulmonary:      Effort: Pulmonary effort is normal. No respiratory distress.      Breath sounds: Normal breath sounds.      Comments: CTAB  Abdominal:      General: Abdomen is flat. There is no distension.      Palpations: Abdomen is soft.      Tenderness: There is no abdominal tenderness.   Musculoskeletal:      Right lower leg: No edema.      Left lower leg: No edema.   Skin:     General: Skin is warm and dry.   Neurological:      General: No focal deficit present.      Mental Status: He is alert.   Psychiatric:         Mood and Affect: Mood normal.         Behavior: Behavior normal.         Last Recorded Vitals  Blood pressure 124/59, pulse 69, temperature 37.3 °C (99.1 °F), resp. rate 17, height 1.727 m (5' 8\"), weight 75.7 kg (166 lb 12.8 oz), SpO2 97%.  Intake/Output last 3 Shifts:  No intake/output data recorded.    Relevant Results    Scheduled medications  amoxicillin-pot clavulanate, 1 tablet, oral, q24h  aspirin, 81 mg, oral, Daily  [Held by provider] azaTHIOprine, 50 mg, oral, Daily  B complex-vitamin C-folic acid, 1 capsule, oral, Daily  calcitriol, 0.25 mcg, oral, Daily  carvedilol, 37.5 mg, oral, BID  cinacalcet, 30 mg, oral, Daily  epoetin aida or biosimilar, 150 Units/kg, subcutaneous, Weekly  [Held by provider] heparin (porcine), 5,000 " Units, subcutaneous, q8h ZOË  hydrALAZINE, 100 mg, oral, q8h  insulin glargine, 4 Units, subcutaneous, q24h  insulin lispro, 0-5 Units, subcutaneous, TID  isosorbide mononitrate ER, 60 mg, oral, Daily  lidocaine, 1 patch, transdermal, Daily  metoclopramide, 5 mg, oral, BID  NIFEdipine ER, 60 mg, oral, BID  pantoprazole, 40 mg, oral, Daily before breakfast  pravastatin, 10 mg, oral, Nightly  predniSONE, 5 mg, oral, q AM  tacrolimus, 1 mg, oral, q AM   And  tacrolimus, 1 mg, oral, Nightly  tacrolimus, , Topical, BID      Continuous medications     PRN medications  PRN medications: acetaminophen, bismuth subsalicylate, dextrose, dextrose, glucagon, glucagon, glucagon, glucagon, guaiFENesin, loperamide, metoclopramide, ondansetron, ondansetron, oxyCODONE, oxygen, traMADol  Results for orders placed or performed during the hospital encounter of 05/11/24 (from the past 24 hour(s))   POCT GLUCOSE   Result Value Ref Range    POCT Glucose 137 (H) 74 - 99 mg/dL   POCT GLUCOSE   Result Value Ref Range    POCT Glucose 244 (H) 74 - 99 mg/dL   POCT GLUCOSE   Result Value Ref Range    POCT Glucose 256 (H) 74 - 99 mg/dL   POCT GLUCOSE   Result Value Ref Range    POCT Glucose 96 74 - 99 mg/dL   CBC and Auto Differential   Result Value Ref Range    WBC 3.2 (L) 4.4 - 11.3 x10*3/uL    nRBC 0.0 0.0 - 0.0 /100 WBCs    RBC 2.75 (L) 4.50 - 5.90 x10*6/uL    Hemoglobin 7.6 (L) 13.5 - 17.5 g/dL    Hematocrit 25.0 (L) 41.0 - 52.0 %    MCV 91 80 - 100 fL    MCH 27.6 26.0 - 34.0 pg    MCHC 30.4 (L) 32.0 - 36.0 g/dL    RDW 13.7 11.5 - 14.5 %    Platelets 101 (L) 150 - 450 x10*3/uL    Neutrophils % 60.2 40.0 - 80.0 %    Immature Granulocytes %, Automated 5.7 (H) 0.0 - 0.9 %    Lymphocytes % 19.9 13.0 - 44.0 %    Monocytes % 10.4 2.0 - 10.0 %    Eosinophils % 3.5 0.0 - 6.0 %    Basophils % 0.3 0.0 - 2.0 %    Neutrophils Absolute 1.91 1.20 - 7.70 x10*3/uL    Immature Granulocytes Absolute, Automated 0.18 0.00 - 0.70 x10*3/uL    Lymphocytes Absolute  0.63 (L) 1.20 - 4.80 x10*3/uL    Monocytes Absolute 0.33 0.10 - 1.00 x10*3/uL    Eosinophils Absolute 0.11 0.00 - 0.70 x10*3/uL    Basophils Absolute 0.01 0.00 - 0.10 x10*3/uL   Comprehensive metabolic panel   Result Value Ref Range    Glucose 185 (H) 74 - 99 mg/dL    Sodium 136 136 - 145 mmol/L    Potassium 4.4 3.5 - 5.3 mmol/L    Chloride 99 98 - 107 mmol/L    Bicarbonate 30 21 - 32 mmol/L    Anion Gap 11 10 - 20 mmol/L    Urea Nitrogen 22 6 - 23 mg/dL    Creatinine 4.88 (H) 0.50 - 1.30 mg/dL    eGFR 12 (L) >60 mL/min/1.73m*2    Calcium 8.8 8.6 - 10.6 mg/dL    Albumin 2.9 (L) 3.4 - 5.0 g/dL    Alkaline Phosphatase 42 33 - 136 U/L    Total Protein 5.6 (L) 6.4 - 8.2 g/dL    AST 22 9 - 39 U/L    Bilirubin, Total 0.2 0.0 - 1.2 mg/dL    ALT 14 10 - 52 U/L   Tacrolimus level   Result Value Ref Range    Tacrolimus  2.6 <=15.0 ng/mL   Morphology   Result Value Ref Range    RBC Morphology See Below     RBC Fragments Few     Ovalocytes Few    POCT GLUCOSE   Result Value Ref Range    POCT Glucose 165 (H) 74 - 99 mg/dL         Vascular US upper extremity venous duplex right    Result Date: 5/18/2024            [place] [address]   Tel [phone] and Fax [phone]  Vascular Lab Report [facility] US UPPER EXTREMITY VENOUS DUPLEX RIGHT  Patient Name:     KELVIN Flannery Physician: [id number] Tang Aguilar MD Study Date:       5/14/2024           Ordering           [id number] JEFFREY BIRD                                       Physician:         DIEGO MRN/PID:          [id number]            Technologist:      Fatimah Sim RVT,                                                          [facility] Accession#:       [id number]        Technologist 2: Date of           1955 / 68      Encounter#:        [id number] Birth/Age:        years Gender:           M Admission Status: Inpatient           Location           [facility]                                       Performed:   Diagnosis/ICD: Right arm swelling-M79.89 CPT Codes:     81773 Peripheral venous duplex scan for DVT Limited  CONCLUSIONS: Right Upper Venous: No evidence of acute deep vein thrombus visualized in the right upper extremity. Left Upper Venous: The subclavian vein demonstrates a normal spontaneous and phasic flow.  Imaging & Doppler Findings:  Right               Compressible Thrombus        Flow Internal Jugular        Yes        None   Spontaneous/Phasic Subclavian              Yes        None Subclavian Proximal     Yes        None   Spontaneous/Phasic Subclavian Mid          Yes        None Subclavian Distal       Yes        None Axillary                Yes        None   Spontaneous/Phasic Brachial                Yes        None Cephalic                Yes        None Basilic                 Yes        None  Left                Thrombus        Flow Subclavian Proximal   None   Spontaneous/Phasic  [id number] Tang Aguilar MD Electronically signed by [id number] Tang Aguilar MD on 5/18/2024 at 6:08:14 AM  ** Final **     XR abdomen 1 view    Result Date: 5/17/2024  Interpreted By:  Yohana Delgadillo, STUDY: XR ABDOMEN 1 VIEW;  5/17/2024 1:01 pm   INDICATION: Signs/Symptoms:Nausea vomiting.   COMPARISON: 09/22/2023   ACCESSION NUMBER(S): [id number]   ORDERING CLINICIAN: JEFFREY CORTEZ   FINDINGS: Nonobstructive bowel gas pattern. Limited evaluation of pneumoperitoneum on supine imaging, however no gross evidence of free air is noted.   Question right lower lobe infiltrate   Osseous structures demonstrate no acute bony changes.       1.  Nonspecific bowel gas pattern. 2. Question right lower lobe infiltrate.   MACRO: None   Signed by: Yohana Delgadillo 5/17/2024 3:12 PM Dictation workstation:   [id number]                   Assessment/Plan   Principal Problem:    Shortness of breath    69 y/o M with ESRD s/p renal transplant x2, prostate cancer s/p radical prostatectomy, DM2, MGUS, HTN who presented with volume overload and treated for PNA.      Updates:   - Plan to discharge today with Tuesday, Thursday, Saturday dialysis ( HD chair confirmed for Tuesday)  - Pt continued to report cough; ordered repeat CXR  - Discontinued azathioprine, per nephrology  - Discharging on 4 units of basal insulin + SSI  - Discharging on  tacrolimus 1mg BID, prednisone 5mg daily, and bumex 2mg on non-dialysis days   - Discharging with 4 additional days of augmentin to complete 10-day course for pneumonia treatment    #Nausea and vomiting [resolved]  - KUB 5/17 showed nonspecific bowel gas pattern  - Pt no longer nauseous or vomiting in the afternoon; receiving scheduled Reglan 5 mg  - Continue Zofran PRN and Reglan 5 mg BID   - Continue Reglan 10 mg TID PRN      #ESRD s/p renal transplant (X2)  #Hypervolemia  :: Renal transplants in 1992 and 2013  ::B/l Cr 5.5  :: Dialysis access created 3/2024 in LUE with palpable thrill   :: TTE 4/2024 EF 54%  - Transplant nephrology following   - CTAP w/ R>L pulmonary edema and R>L moderate pleural effusions with near-complete collapse of the right lower lobe.   - Crackles resolved on exam; BLE edema improved  - Continue home Nifedipine, Hydralazine, Coreg and Indur  - Discontinued aziathroprine per transplant nephrology recs  - Received dialysis 5/17; TTS schedule outpatient   - Bumex 3mg on non-dialysis days per nephrology PRN volume overload  - Continue tacrolimus 1 mg BID and prednisone 5 mg daily for now per nephrology; pt will gradually be tapered down from immunosuppressants in setting of failed kidney transplant  - Hgb 7.6 5/19  - Continue home epoetin aida injection 10,000 units weekly   - Strict I&Os  - Daily weights      #Hospital Acquired Pneumonia  ::CXR 5/11: There are hazy and dense opacities over the right mid lung and bilateral lung bases with blunting of the costophrenic angles.   1. Bilateral pleural effusions with bibasilar atelectasis or consolidation, increased from previous radiograph on 04/03/2024. 2. Prominent  interstitial and perihilar markings may reflect component of pulmonary edema.   - 5/14 Pt developed low grade fever to 100.6 and tachypnea - Continued to sat well on 2-4L O2. Also developed nausea and vomiting, which later resolved  - Pt remains afebrile  - Continue ABX (Augmentin) for 4 more days to complete 10 day course  - Peripheral blood cultures with no growth at 4 days (final)   - Respiratory viral panel negative   - Follow up repeat CXR   - Continue incentive spirometry 10 times per hour while awake   - Wean O2 as tolerated     #RUE swelling  - Right upper extremity w/ 3+ pitting edema, nonerythematous, and nontender to palpation  - No IV medication has gone through that arm per nursing; IV was not infiltrated  - Remote h/o of AVF in RUE; swelling could possibly be d/t some venous outflow obstruction  - Does not appear to be cellulitis or lymphedema  - Pt occasionally found laying on R side; could be dependent   - Venous duplex RUE negative for DVT  - RUE edema improved after ACE wrap and elevation; arm continues to look improved today  - CTM     #Chronic HTN  :: H/o multiple prior admissions for hypervolemia and hypertensive urgency   - Continue home Hydralazine, Nifedipine, Coreg, and Indur  - Pt w/ episode of hypertensive urgency and flash pulmonary edema 5/11 afternoon with HA, SOB, severe ABD pain, nausea, and vomiting  - Ordered bedside EKG 5/11; not significantly changed from previous  - VBG wnl   - CTCAP negative for aortic dissection   - CTM BP     #Possible HIT  - Platelets decreased to 70 today; gradually downtrending over past 4 days after decreasing 46% post-heparin on day of admission  - ESRD could be contributing to platelet dysfunction  - Hold SubQ heparin   - Anti-platelet factor 4 antibody negative  - Platelets increased to 101 today   - SCDs for DVT prophylaxis      #DMII  :: A1C 7.7 3 months ago   :: Home regimen: insulin glargine 20 units daily and Humalog TID with meals: 100-199 2  units, 200-299 4 units, 300-399 6 units  - POCT glucose 120s-130s  - Glucose 129 on CMP 5/14  - Basal Lantus reduced to 4 units from 8 units d/t hypoglycemia to 22 5/17 PM, however, pt never received reduced basal dose d/t refusing insulin the next 2 days   - No repeat episodes of hypoglycemia since 5/17 PM   - CTM Q4 hour POC glucose  - Discharge with glucometer, diabetes education and follow up w/ PCP     #H/o head lice  - No active lice on scalp exam 5/13  - No need for isolation  - No additional evidence of active lice to date   - Does not need a second application of permethrin         VTE ppx: SCDs  Diet: Adult Renal   PPX: Pantoprozole 40 mg  Bowel regimen: -  Code Status: Full code   Contact Number: Amaliacheyanne Enriquez (friend) [phone]  Dispo: Home                Lindsey Mcpherson, MS4

## 2024-05-19 NOTE — CARE PLAN
The patient's goals for the shift include      The clinical goals for the shift include Patient will main spO2>92%      Problem: Pain  Goal: My pain/discomfort is manageable  Outcome: Progressing     Problem: Safety  Goal: Patient will be injury free during hospitalization  Outcome: Progressing  Goal: I will remain free of falls  Outcome: Progressing     Problem: Daily Care  Goal: Daily care needs are met  Outcome: Progressing     Problem: Psychosocial Needs  Goal: Demonstrates ability to cope with hospitalization/illness  Outcome: Progressing  Goal: Collaborate with me, my family, and caregiver to identify my specific goals  Outcome: Progressing     Problem: Discharge Barriers  Goal: My discharge needs are met  Outcome: Progressing     Problem: Diabetes  Goal: Achieve decreasing blood glucose levels by end of shift  Outcome: Progressing  Goal: Increase stability of blood glucose readings by end of shift  Outcome: Progressing  Goal: Decrease in ketones present in urine by end of shift  Outcome: Progressing  Goal: Maintain electrolyte levels within acceptable range throughout shift  Outcome: Progressing  Goal: Maintain glucose levels >70mg/dl to <250mg/dl throughout shift  Outcome: Progressing  Goal: No changes in neurological exam by end of shift  Outcome: Progressing  Goal: Learn about and adhere to nutrition recommendations by end of shift  Outcome: Progressing  Goal: Vital signs within normal range for age by end of shift  Outcome: Progressing  Goal: Increase self care and/or family involovement by end of shift  Outcome: Progressing  Goal: Receive DSME education by end of shift  Outcome: Progressing     Problem: Chronic Conditions and Co-morbidities  Goal: Patient's chronic conditions and co-morbidity symptoms are monitored and maintained or improved  Outcome: Progressing     Problem: Pain  Goal: Takes deep breaths with improved pain control throughout the shift  Outcome: Progressing  Goal: Turns in bed with  improved pain control throughout the shift  Outcome: Progressing  Goal: Walks with improved pain control throughout the shift  Outcome: Progressing  Goal: Performs ADL's with improved pain control throughout shift  Outcome: Progressing  Goal: Participates in PT with improved pain control throughout the shift  Outcome: Progressing  Goal: Free from opioid side effects throughout the shift  Outcome: Progressing  Goal: Free from acute confusion related to pain meds throughout the shift  Outcome: Progressing

## 2024-05-19 NOTE — DISCHARGE SUMMARY
Discharge Diagnosis  Shortness of breath    Issues Requiring Follow-Up  ESRD  Pneumonia  DMII    Test Results Pending At Discharge  Pending Labs       No current pending labs.            Hospital Course  Admitted 5/11. Received 2 mg Bumex IV and continued home Hydralazine and Nifedipine. Transplant nephrology consulted 5/11 d/t h/o 2 renal transplants on immunosuppressants. A few hours later, pt had an episode of HTN to 220s/80s w/ HA, severe ABD pain, nausea, vomiting, increased back pain, tachypnea to the 30s and increased crackles on respiratory exam. Gave Zofran and one-time dilaudid for pain control. Started his home Coreg and Indur + IV hydralazine 5 mg. Ordered EKG, which was unremarkable compared with most recent previous, and VBG, which was normal. After these measures pt resting more comfortably on supplemental O2 NC, though still tachypneic with shallow breaths. Discussed additional diuresis with transplant nephrologist who recommended giving Bumex 3 mg in the context of likely flash pulmonary edema. CTCAP w/ contrast negative for aortic dissection. BP improved and sx resolved by 5/12.   Pt w/ some confusion and SOB when seen by nephrology 5/13; started HD. Tapering immunosuppressants per transplant nephrology d/t failed transplant. Restarted ABX (vancomycin/zosyn) for HAP d/t low grade fever 5/14, resolved. Discontinued IV Vancomycin 5/16 and started PO Augmentin. Pt w/ nausea and vomiting 5/17 that persisted after Zofran. Ordered ABD XR d/t concern for gastroparesis given pt's h/o DMII, but it showed no evidence of obstruction. N/V resolved on scheduled Reglan 5 mg BID. Pt had an episode of hypoglycemia to 22 5/17 PM; basal insulin reduced to 4 units, but pt never received this dose as he refused insulin the next 2 days. His POCT glucose ranged from  during this time. Pt then desatted to 88% overnight 5/17 while sleeping. O2 sat has since remained above 92% on 2L O2. Ordered CXR 5/19 d/t pt  continuing to c/o cough; shows improved pulmonary edema since comparison 5/11 CXR, and continued radiologic evidence of RLL pneumonia. Discharge on remaining 4 days of PO Augmentin. Outpatient dialysis TTS and follow up with transplant nephrology. Follow up with PCP for adjustment of insulin regimen and sleep study to evaluate for sleep apnea.      Pertinent Physical Exam At Time of Discharge  Physical Exam  Constitutional:       General: He is not in acute distress.  HENT:      Head: Normocephalic and atraumatic.   Eyes:      Extraocular Movements: Extraocular movements intact.      Pupils: Pupils are equal, round, and reactive to light.   Cardiovascular:      Rate and Rhythm: Normal rate and regular rhythm.      Heart sounds: Normal heart sounds.      Comments: Murmur (2 out of 6 systolic murmur heard greatest over the left upper sternal border)   Pulmonary:      Effort: Pulmonary effort is normal. No respiratory distress.      Breath sounds: Normal breath sounds. No wheezing or rales.   Abdominal:      General: Abdomen is flat. There is no distension.      Palpations: Abdomen is soft.   Musculoskeletal:      Right lower leg: No edema.      Left lower leg: No edema.   Skin:     General: Skin is warm and dry.   Neurological:      General: No focal deficit present.      Mental Status: He is alert.   Psychiatric:         Mood and Affect: Mood normal.         Behavior: Behavior normal.         Home Medications     Medication List      START taking these medications     amoxicillin-pot clavulanate 500-125 mg tablet; Commonly known as:   Augmentin; Take 1 tablet by mouth once every 24 hours for 5 days. Take   augmentin once daily from 5/19 to 5/23 to complete treatment for pneumonia   Gvoke HypoPen 1-Pack 1 mg/0.2 mL auto-injector; Generic drug: glucagon;   Inject 1 mg into the muscle every 15 minutes if needed (For blood glucose   41 to 70 mg/dL and no IV access).   insulin glargine 100 unit/mL injection; Commonly  "known as: Lantus;   Inject 8 Units under the skin once every 24 hours for 2 doses. Take as   directed per insulin instructions.; Replaces: Lantus Solostar U-100   Insulin 100 unit/mL (3 mL) pen   loperamide 2 mg capsule; Commonly known as: Imodium; Take 1 capsule (2   mg) by mouth 4 times a day as needed for diarrhea.   Renal Caps 1 mg capsule; Generic drug: B complex-vitamin C-folic acid;   Take 1 capsule by mouth once daily. Do not fill before May 17, 2024.     CHANGE how you take these medications     bumetanide 2 mg tablet; Commonly known as: Bumex; Take 1 tablet (2 mg)   by mouth 4 times a week. Take bumetanide 2mg once daily on non-dialysis   days (Sunday, Monday, Wednesday, Friday); What changed: when to take this,   additional instructions   * tacrolimus 0.1 % ointment; Commonly known as: Protopic; Apply   topically 2 times a day.; What changed: Another medication with the same   name was changed. Make sure you understand how and when to take each.,   Another medication with the same name was removed. Continue taking this   medication, and follow the directions you see here.   * tacrolimus 1 mg capsule; Commonly known as: Prograf; Take 1 capsule (1   mg) by mouth once daily in the morning AND 1 capsule (1 mg) once daily at   bedtime.; What changed: medication strength, See the new instructions.,   Another medication with the same name was removed. Continue taking this   medication, and follow the directions you see here.  * This list has 2 medication(s) that are the same as other medications   prescribed for you. Read the directions carefully, and ask your doctor or   other care provider to review them with you.     CONTINUE taking these medications     acetaminophen 325 mg tablet; Commonly known as: Tylenol; Take 3 tablets   (975 mg) by mouth every 6 hours if needed (pain).   aspirin 81 mg chewable tablet; Chew 1 tablet (81 mg) once daily.   BD Luer-Rita Syringe 3 mL 25 x 5/8\" syringe; Generic drug: syringe " "with   needle; Use to inject epogen.   calcitriol 0.25 mcg capsule; Commonly known as: Rocaltrol; Take 1   capsule (0.25 mcg) by mouth once daily. Do not start before December 22, 2023.   carvedilol 12.5 mg tablet; Commonly known as: Coreg; TAKE THREE (3)   TABLETS BY MOUTH TWICE DAILY   cinacalcet 30 mg tablet; Commonly known as: Sensipar; Take 1 tablet (30   mg) by mouth once daily.   Easy Touch Alcohol Prep Pads pads, medicated; Generic drug: alcohol   swabs   Epogen 10,000 unit/mL injection; Generic drug: epoetin aida; Inject 1 mL   (10,000 Units) under the skin every 7 days. Do not start before April 17, 2024.   hydrALAZINE 100 mg tablet; Commonly known as: Apresoline; Take 1 tablet   (100 mg) by mouth every 8 hours.   insulin lispro 100 unit/mL injection; Commonly known as: HumaLOG   isosorbide mononitrate ER 60 mg 24 hr tablet; Commonly known as: Imdur;   Take 1 tablet (60 mg) by mouth once daily.   metoclopramide 5 mg tablet; Commonly known as: Reglan; Take 1 tablet (5   mg) by mouth 2 times a day. 1 tablet before dinner and 1 tablet at   bedtime.   NIFEdipine ER 60 mg 24 hr tablet; Commonly known as: Adalat CC; Take 1   tablet (60 mg) by mouth 2 times a day. Do not crush, chew, or split.   pantoprazole 40 mg EC tablet; Commonly known as: ProtoNix; Take 1 tablet   (40 mg) by mouth once daily in the morning. Take before meals. Do not   crush, chew, or split. Do not start before January 22, 2024.   pen needle, diabetic 32 gauge x 5/32\" needle; Commonly known as:   TechLITE Pen Needle; 1 each 4 times a day.   pravastatin 10 mg tablet; Commonly known as: Pravachol; Take 1 tablet   (10 mg) by mouth once daily at bedtime.   predniSONE 5 mg tablet; Commonly known as: Deltasone; Take 1 tablet (5   mg) by mouth once daily in the morning. Do not start before January 22, 2024.   * Unilet Super Thin Lancets 30 gauge misc; Generic drug: lancets; USE TO   TEST BLOOD SUGAR THREE TIMES A DAY   * Unilet Super Thin " Lancets 30 gauge misc; Generic drug: lancets; 1 each   3 times a day.  * This list has 2 medication(s) that are the same as other medications   prescribed for you. Read the directions carefully, and ask your doctor or   other care provider to review them with you.     STOP taking these medications     azaTHIOprine 50 mg tablet; Commonly known as: Imuran   Lantus Solostar U-100 Insulin 100 unit/mL (3 mL) pen; Generic drug:   insulin glargine; Replaced by: insulin glargine 100 unit/mL injection   Lokelma 5 gram packet; Generic drug: sodium zirconium cyclosilicate   permethrin 5 % cream; Commonly known as: Elimite       Outpatient Follow-Up  Future Appointments   Date Time Provider Department Center   5/28/2024  7:45 AM [facility]   5/28/2024  8:45 AM [facility]   5/28/2024  9:45 AM [facility]   5/28/2024 10:45 AM [facility] 2 [facility]   5/28/2024 11:45 AM [facility] 2 [facility]   6/3/2024  1:40 PM Melia Qiu PA-C [id number] [facility]   6/11/2024  9:40 AM Sumeet Bacon MD [id number] [facility]   6/19/2024 10:00 AM ZAIRE Armas, JER [id number] Academic   6/26/2024  2:30 PM ZAIRE Armas, JER [id number] Academic   10/2/2024  3:00 PM ZAIRE Scott [id number] [facility]   10/16/2024 10:30 AM Vinicius Araiza MD [id number] [facility]       Lindsey Mcpherson

## 2024-05-22 ENCOUNTER — TELEPHONE (OUTPATIENT)
Dept: TRANSPLANT | Facility: HOSPITAL | Age: 69
End: 2024-05-22
Payer: COMMERCIAL

## 2024-05-22 NOTE — TELEPHONE ENCOUNTER
Received message on 5/14, note was in Rx request:   Pt appt on 5/17 was cancelled. Sung/Radha - please call patient to get rescheduled.     Chirag De Oliveira   to [facility]         5/14/24  1:13 PM  Nurse here from  called about Manolo Bashir he's currently a inpatient here and the nurse would like to push his upcoming appt back

## 2024-05-23 DIAGNOSIS — Z94.0 KIDNEY TRANSPLANTED (HHS-HCC): ICD-10-CM

## 2024-05-23 PROCEDURE — RXMED WILLOW AMBULATORY MEDICATION CHARGE

## 2024-05-23 RX ORDER — PREDNISONE 5 MG/1
5 TABLET ORAL EVERY MORNING
Qty: 30 TABLET | Refills: 11 | Status: SHIPPED | OUTPATIENT
Start: 2024-05-23 | End: 2025-05-23

## 2024-05-23 RX ORDER — PREDNISONE 5 MG/1
5 TABLET ORAL EVERY MORNING
Qty: 21 TABLET | Refills: 1 | Status: CANCELLED | OUTPATIENT
Start: 2024-05-23 | End: 2024-06-22

## 2024-05-24 ENCOUNTER — TELEPHONE (OUTPATIENT)
Dept: HOME HEALTH SERVICES | Facility: HOME HEALTH | Age: 69
End: 2024-05-24
Payer: COMMERCIAL

## 2024-05-24 PROCEDURE — RXMED WILLOW AMBULATORY MEDICATION CHARGE

## 2024-05-24 NOTE — TELEPHONE ENCOUNTER
Hello,    Your recent home care referral for Manolo Bashir has been made a Non Admit with  Home Care due to Inability to Contact Patient. If you have further questions, feel free to reach out to our office at [phone].     Thank you,   [facility]

## 2024-05-25 ENCOUNTER — PHARMACY VISIT (OUTPATIENT)
Dept: PHARMACY | Facility: CLINIC | Age: 69
End: 2024-05-25
Payer: MEDICAID

## 2024-05-28 ENCOUNTER — HOSPITAL ENCOUNTER (OUTPATIENT)
Dept: RADIOLOGY | Facility: HOSPITAL | Age: 69
Discharge: HOME | End: 2024-05-28
Payer: COMMERCIAL

## 2024-05-28 DIAGNOSIS — R11.2 NAUSEA AND VOMITING, UNSPECIFIED VOMITING TYPE: ICD-10-CM

## 2024-05-28 PROCEDURE — A9541 TC99M SULFUR COLLOID: HCPCS | Performed by: PHYSICIAN ASSISTANT

## 2024-05-28 PROCEDURE — 78264 GASTRIC EMPTYING IMG STUDY: CPT | Performed by: RADIOLOGY

## 2024-05-28 PROCEDURE — 78264 GASTRIC EMPTYING IMG STUDY: CPT

## 2024-05-28 PROCEDURE — 3430000001 HC RX 343 DIAGNOSTIC RADIOPHARMACEUTICALS: Performed by: PHYSICIAN ASSISTANT

## 2024-05-28 RX ADMIN — Medication 1 MILLICURIE: at 07:39

## 2024-06-03 ENCOUNTER — OFFICE VISIT (OUTPATIENT)
Dept: GASTROENTEROLOGY | Facility: HOSPITAL | Age: 69
End: 2024-06-03
Payer: COMMERCIAL

## 2024-06-03 VITALS
HEART RATE: 68 BPM | SYSTOLIC BLOOD PRESSURE: 110 MMHG | BODY MASS INDEX: 24.25 KG/M2 | DIASTOLIC BLOOD PRESSURE: 66 MMHG | WEIGHT: 160 LBS | HEIGHT: 68 IN | OXYGEN SATURATION: 100 % | TEMPERATURE: 97.5 F

## 2024-06-03 DIAGNOSIS — K31.84 GASTROPARESIS: Primary | ICD-10-CM

## 2024-06-03 PROCEDURE — 99214 OFFICE O/P EST MOD 30 MIN: CPT | Performed by: PHYSICIAN ASSISTANT

## 2024-06-03 PROCEDURE — 3078F DIAST BP <80 MM HG: CPT | Performed by: PHYSICIAN ASSISTANT

## 2024-06-03 PROCEDURE — 1111F DSCHRG MED/CURRENT MED MERGE: CPT | Performed by: PHYSICIAN ASSISTANT

## 2024-06-03 PROCEDURE — 3051F HG A1C>EQUAL 7.0%<8.0%: CPT | Performed by: PHYSICIAN ASSISTANT

## 2024-06-03 PROCEDURE — 3074F SYST BP LT 130 MM HG: CPT | Performed by: PHYSICIAN ASSISTANT

## 2024-06-03 PROCEDURE — 1036F TOBACCO NON-USER: CPT | Performed by: PHYSICIAN ASSISTANT

## 2024-06-03 PROCEDURE — 3008F BODY MASS INDEX DOCD: CPT | Performed by: PHYSICIAN ASSISTANT

## 2024-06-03 PROCEDURE — 3062F POS MACROALBUMINURIA REV: CPT | Performed by: PHYSICIAN ASSISTANT

## 2024-06-03 PROCEDURE — 1159F MED LIST DOCD IN RCRD: CPT | Performed by: PHYSICIAN ASSISTANT

## 2024-06-03 PROCEDURE — 1125F AMNT PAIN NOTED PAIN PRSNT: CPT | Performed by: PHYSICIAN ASSISTANT

## 2024-06-03 ASSESSMENT — ENCOUNTER SYMPTOMS
DYSURIA: 0
CONFUSION: 0
UNEXPECTED WEIGHT CHANGE: 0
CONSTIPATION: 0
BLOOD IN STOOL: 0
WEAKNESS: 0
DIARRHEA: 0
POLYDIPSIA: 0
ABDOMINAL PAIN: 0
DYSPHORIC MOOD: 0
CHOKING: 0
NAUSEA: 0
SORE THROAT: 0
EYE REDNESS: 0
AGITATION: 0
TROUBLE SWALLOWING: 0
POLYPHAGIA: 0
COUGH: 0
JOINT SWELLING: 0
VOMITING: 0

## 2024-06-03 ASSESSMENT — PAIN SCALES - GENERAL: PAINLEVEL: 7

## 2024-06-03 NOTE — PATIENT INSTRUCTIONS
Call  scheduling to schedule referral to Dietician regarding gastroparesis and diabetic diet    Follow up with endocrine regarding your diabetes management   (Dr. Marcum)     Continue with your Reglan dose (5mg) before bedtime and in the evening

## 2024-06-03 NOTE — PROGRESS NOTES
Subjective   Patient ID: Manolo Bashir is a 68 y.o. male who presents for   Last OV with me was on 4/22/24.  Pt is here today with his friend Amalia.    HPI  Mr. Bashir is a 66 y/o AAM with h/o ESRD secondary to HTN, s/p 2 kidney transplants, with the most recent one back in 2013 (on immunosuppression), h/o DMII, h/o hep C ( treated with 12 wks of Harvoni and has SVR) and h/o prostate ca (s/p radical prostatectomy).      Back in Feb 2023, pt had AUS placed and he developed complications with an infection. It was then removed in end of March 2023. During those months, pt experienced watery diarrhea.      He does have h/o diarrhea (secondary to h/o hemicolectomy), but it is usually controlled with Loperamide.   However, with his current symptoms, he was having abd pain and watery diarrhea (5-10x/day) and having nocturnal episodes. He was given abx for his infected device and after completing therapy, he has been doing well.     At a previous  OV with me in 5/2023,  he was doing much better.  His BM reverted back to normal and he denied any abd pain. He never obtained the stool orders that was ordered by his nephrologist.   He denied an f/c, n/v, or blood in stool. He did have labs drawn early May 2023 revealing stable CBC, CMP and lipase.      He also had CT abd/p (without contrast) on 5/7/23 revealing:       Abdomen-Pelvis  1. Soft tissue stranding at the urinary bladder and perinephric fat  stranding at the left iliac fossa renal transplant. Please correlate  with laboratory values/urinalysis to exclude superimposed cystitis  and pyelonephritis.  2. Interval removal of the artificial urinary sphincter reservoir  with focal hyperdensity at the left rectus abdominal muscle and  overlying soft tissues, may represent small hematoma.  3. Mild colonic diverticulosis.    Previous GI workup includes:     EGD in May 2018 - normal esophagus, bleeding erosive gastropathy (bx showed chronic gastritis, neg H.pylori, neg for int  "metaplasia), normal duodenum.     Screening Colonoscopy on April 2017 - perianal skin tags, normal ileum, patent end to end ileo colonic anastamosis with healthy mucosa, diverticulosis and non bleeding ext hemorrhoids. No specimens collected.    At a previous OV, we did order a screening colonoscopy because he was due back in 2022.  He completed it on 8/2023.  It showed decreased sphincter, patent side to side ileocolonic anast with healthy mucosa, normal ileum, diverticulosis, non bleeding external hemorrhoids.  No specimens collected.  He will be due in 7 yrs (2030).    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 He does have AVG that can be used for dialysis if needed.     Back in 4/22/24, he came in for an OV for periumbilcal abd pain that was occurring for 3 months.  Pt did have cholecystecomy  approx 3 months ago for his chronic RUQ pain that radiates to his back, but did not seem to \"solve the problem\". Now he is having this persistent \"pulling pain\" around upper abdominal region down to  belly button and now he is having excess gas, with nausea/vomiting in the morning for past month.  Pt admits to throwing up \"food and acid\".  He denied any hematemesis, melena, or any hematochezia.    Last CT abd (no IV) was on 11/15/23 done for right flank pain.    Showed left pelvis transplant kidney, diffuse anasarca, mild bladder wall thickening, sm HH, large amount of gastric debris.    Since last visit, we presumed his sx may be from gastroparesis. We started him on Reglan - 5mg before each meal and bedtime and we ordered a GES ,which did was done in April 2024 confirming he has gastroparesis.     He is back today for follow up. He missed his appt with me back in May secondary to being hospitalized for pneumonia and hypoglycemia.  Pt is also back in HD the past 2 wks and has scheduled appt with transplant for possible re listing for another kidney tx.     He is doing better after " starting Reglan. He is not having much abd pain and his n/v episodes has subsided.          Review of Systems   Constitutional:  Negative for unexpected weight change.   HENT:  Negative for sore throat and trouble swallowing.    Eyes:  Negative for redness.   Respiratory:  Negative for cough and choking.    Cardiovascular:  Negative for chest pain.   Gastrointestinal:  Negative for abdominal pain, blood in stool, constipation, diarrhea, nausea and vomiting.   Endocrine: Negative for polydipsia and polyphagia.   Genitourinary:  Negative for dysuria.   Musculoskeletal:  Negative for joint swelling.   Skin:  Negative for rash.   Neurological:  Negative for weakness.   Psychiatric/Behavioral:  Negative for agitation, confusion and dysphoric mood.        Objective   Visit Vitals  Smoking Status Never      Physical Exam  Vitals reviewed.   Constitutional:       Appearance: He is not ill-appearing or toxic-appearing.   HENT:      Head: Normocephalic and atraumatic.      Mouth/Throat:      Pharynx: Oropharynx is clear. No oropharyngeal exudate.   Eyes:      General: No scleral icterus.  Cardiovascular:      Rate and Rhythm: Normal rate and regular rhythm.      Heart sounds: Normal heart sounds.   Pulmonary:      Effort: Pulmonary effort is normal. No respiratory distress.   Abdominal:      General: Bowel sounds are normal. There is no distension.      Palpations: Abdomen is soft.      Tenderness: There is no abdominal tenderness. There is no guarding or rebound.      Comments: Bruising on RLQ from his insulin injections   Musculoskeletal:         General: No deformity.      Cervical back: Neck supple.   Lymphadenopathy:      Cervical: No cervical adenopathy.   Skin:     Findings: No bruising.   Neurological:      Mental Status: He is alert. Mental status is at baseline.   Psychiatric:         Mood and Affect: Mood normal.         Behavior: Behavior normal.       Assessment/Plan     1) Persistent upper abd pain with n/v  -  S/p GES on April 2024 revealing pt to have gastroparesis. Pt appears to be stable with his Reglan dosing of 5mg before dinner and at bedtime.  Continue current PPI dosing (Pantoprazole 40 mg daily).  Will refer him to dietician to discuss a gastroparesis and diabetic diet.  Will have him schedule follow up with endocrinology regarding management of his DM. Pt claims his blood sugars at home are running around 200s.  Last Hgb A1c = 7.7 back in Jan 2024.    2) h/o adenomatous polyps -  S/p colonoscopy on 8/2023 - No polyps found.  Repeat surveillance in 7 yrs (2030)     Follow up in 3-4 months

## 2024-06-05 ENCOUNTER — TELEPHONE (OUTPATIENT)
Dept: TRANSPLANT | Facility: HOSPITAL | Age: 69
End: 2024-06-05
Payer: COMMERCIAL

## 2024-06-10 PROBLEM — E78.5 DYSLIPIDEMIA: Status: ACTIVE | Noted: 2024-06-10

## 2024-06-10 PROBLEM — N18.9 CHRONIC KIDNEY DISEASE (CKD): Status: ACTIVE | Noted: 2024-06-10

## 2024-06-10 PROBLEM — R39.9 SYMPTOMS INVOLVING URINARY SYSTEM: Status: ACTIVE | Noted: 2024-06-10

## 2024-06-10 PROBLEM — N39.0 ACUTE LOWER URINARY TRACT INFECTION: Status: ACTIVE | Noted: 2024-06-10

## 2024-06-12 ENCOUNTER — DOCUMENTATION (OUTPATIENT)
Dept: TRANSPLANT | Facility: HOSPITAL | Age: 69
End: 2024-06-12
Payer: COMMERCIAL

## 2024-06-12 ENCOUNTER — TELEPHONE (OUTPATIENT)
Dept: TRANSPLANT | Facility: HOSPITAL | Age: 69
End: 2024-06-12
Payer: COMMERCIAL

## 2024-06-12 DIAGNOSIS — N18.6 ESRD (END STAGE RENAL DISEASE) (MULTI): Primary | ICD-10-CM

## 2024-06-12 PROCEDURE — RXMED WILLOW AMBULATORY MEDICATION CHARGE

## 2024-06-12 NOTE — PROGRESS NOTES
Do you have difficulty reading or writing in English?   no   What is the primary cause of your kidney disease?  High Blood Pressure  Are you currently on dialysis?   yes  If yes, what days do you have your dialysis treatments? T,TH,SAT  Have you received a transplant before?   yes  If yes, what organ, and when and where was your transplant? Kidney-1994, 2013- [facility].  Have you been diagnosed with diabetes?    yes  Have you tested positive for hepatitis or HIV?  Hep-C  Have you ever been diagnosed with cancer?   yes  If yes, what type of cancer, and when and where were you treated? Prostate-2013  Do you have a history of a heart attack or stroke?  No  Are you currently or have you previously been seen by a mental health professional?   no  If yes, what is the name of your mental health provider?     Are you a current or former tobacco user?   no  Do you have history of alcohol abuse or dependence?   no  Do you have a history of illegal drug abuse or dependence?   no  Has anyone told you they're willing to donate their kidney to you?   yes  Comments: Intake complete, scheduled eval appt.

## 2024-06-13 ENCOUNTER — PHARMACY VISIT (OUTPATIENT)
Dept: PHARMACY | Facility: CLINIC | Age: 69
End: 2024-06-13
Payer: MEDICAID

## 2024-06-14 ENCOUNTER — TELEPHONE (OUTPATIENT)
Dept: TRANSPLANT | Facility: HOSPITAL | Age: 69
End: 2024-06-14
Payer: COMMERCIAL

## 2024-06-14 DIAGNOSIS — Z94.0 KIDNEY TRANSPLANTED (HHS-HCC): ICD-10-CM

## 2024-06-14 RX ORDER — TACROLIMUS 1 MG/1
1 CAPSULE ORAL 2 TIMES DAILY
Qty: 60 CAPSULE | Refills: 11 | Status: SHIPPED | OUTPATIENT
Start: 2024-06-14 | End: 2025-06-14

## 2024-06-14 NOTE — TELEPHONE ENCOUNTER
Patient called he needs a refill on tacrolimus please send to [facility] on [address]

## 2024-06-14 NOTE — TELEPHONE ENCOUNTER
Patient is requesting a refill for tacrolimus. Confirmed dosage is 1 mg BID. Script sent to [facility] pending Dr. Bridges signature.

## 2024-06-18 DIAGNOSIS — E78.5 DYSLIPIDEMIA: ICD-10-CM

## 2024-06-19 PROCEDURE — RXMED WILLOW AMBULATORY MEDICATION CHARGE

## 2024-06-19 RX ORDER — PRAVASTATIN SODIUM 10 MG/1
10 TABLET ORAL NIGHTLY
Qty: 90 TABLET | Refills: 1 | Status: SHIPPED | OUTPATIENT
Start: 2024-06-19 | End: 2024-12-16

## 2024-06-21 DIAGNOSIS — I15.9 SECONDARY HYPERTENSION: ICD-10-CM

## 2024-06-21 RX ORDER — NIFEDIPINE 60 MG/1
60 TABLET, FILM COATED, EXTENDED RELEASE ORAL 2 TIMES DAILY
Qty: 60 TABLET | Refills: 1 | Status: SHIPPED | OUTPATIENT
Start: 2024-06-21 | End: 2024-08-20

## 2024-06-22 ENCOUNTER — PHARMACY VISIT (OUTPATIENT)
Dept: PHARMACY | Facility: CLINIC | Age: 69
End: 2024-06-22
Payer: MEDICAID

## 2024-06-22 PROCEDURE — RXMED WILLOW AMBULATORY MEDICATION CHARGE

## 2024-06-24 DIAGNOSIS — L30.9 DERMATITIS: ICD-10-CM

## 2024-06-24 RX ORDER — TACROLIMUS 1 MG/G
OINTMENT TOPICAL 2 TIMES DAILY
Qty: 60 G | Refills: 3 | Status: SHIPPED | OUTPATIENT
Start: 2024-06-24 | End: 2025-06-24

## 2024-06-25 PROCEDURE — RXMED WILLOW AMBULATORY MEDICATION CHARGE

## 2024-06-26 ENCOUNTER — HOSPITAL ENCOUNTER (EMERGENCY)
Facility: HOSPITAL | Age: 69
Discharge: HOME | End: 2024-06-26
Attending: EMERGENCY MEDICINE
Payer: COMMERCIAL

## 2024-06-26 ENCOUNTER — DOCUMENTATION (OUTPATIENT)
Dept: TRANSPLANT | Facility: HOSPITAL | Age: 69
End: 2024-06-26

## 2024-06-26 ENCOUNTER — OFFICE VISIT (OUTPATIENT)
Dept: TRANSPLANT | Facility: HOSPITAL | Age: 69
End: 2024-06-26
Payer: COMMERCIAL

## 2024-06-26 ENCOUNTER — APPOINTMENT (OUTPATIENT)
Dept: PRIMARY CARE | Facility: CLINIC | Age: 69
End: 2024-06-26
Payer: COMMERCIAL

## 2024-06-26 ENCOUNTER — APPOINTMENT (OUTPATIENT)
Dept: RADIOLOGY | Facility: HOSPITAL | Age: 69
End: 2024-06-26
Payer: COMMERCIAL

## 2024-06-26 ENCOUNTER — CLINICAL SUPPORT (OUTPATIENT)
Dept: EMERGENCY MEDICINE | Facility: HOSPITAL | Age: 69
End: 2024-06-26
Payer: COMMERCIAL

## 2024-06-26 VITALS
TEMPERATURE: 99 F | BODY MASS INDEX: 23.95 KG/M2 | SYSTOLIC BLOOD PRESSURE: 151 MMHG | WEIGHT: 158 LBS | HEART RATE: 65 BPM | HEIGHT: 68 IN | RESPIRATION RATE: 20 BRPM | DIASTOLIC BLOOD PRESSURE: 76 MMHG | OXYGEN SATURATION: 100 %

## 2024-06-26 VITALS
BODY MASS INDEX: 24.1 KG/M2 | TEMPERATURE: 97.8 F | OXYGEN SATURATION: 100 % | RESPIRATION RATE: 20 BRPM | SYSTOLIC BLOOD PRESSURE: 100 MMHG | HEART RATE: 70 BPM | DIASTOLIC BLOOD PRESSURE: 51 MMHG | WEIGHT: 158.5 LBS

## 2024-06-26 DIAGNOSIS — R07.9 CHEST PAIN, UNSPECIFIED TYPE: Primary | ICD-10-CM

## 2024-06-26 DIAGNOSIS — Z92.25 PERSONAL HISTORY OF IMMUNOSUPRESSION THERAPY: ICD-10-CM

## 2024-06-26 DIAGNOSIS — R42 LIGHTHEADEDNESS: ICD-10-CM

## 2024-06-26 DIAGNOSIS — Z94.0 KIDNEY TRANSPLANTED (HHS-HCC): Primary | ICD-10-CM

## 2024-06-26 DIAGNOSIS — D84.9 IMMUNOSUPPRESSION (MULTI): ICD-10-CM

## 2024-06-26 LAB
ALBUMIN SERPL BCP-MCNC: 3.5 G/DL (ref 3.4–5)
ALP SERPL-CCNC: 94 U/L (ref 33–136)
ALT SERPL W P-5'-P-CCNC: 9 U/L (ref 10–52)
ANION GAP SERPL CALC-SCNC: 12 MMOL/L (ref 10–20)
AST SERPL W P-5'-P-CCNC: 14 U/L (ref 9–39)
BASOPHILS # BLD AUTO: 0.02 X10*3/UL (ref 0–0.1)
BASOPHILS NFR BLD AUTO: 0.4 %
BILIRUB SERPL-MCNC: 0.4 MG/DL (ref 0–1.2)
BNP SERPL-MCNC: 687 PG/ML (ref 0–99)
BUN SERPL-MCNC: 30 MG/DL (ref 6–23)
CALCIUM SERPL-MCNC: 9.2 MG/DL (ref 8.6–10.6)
CARDIAC TROPONIN I PNL SERPL HS: 28 NG/L (ref 0–53)
CARDIAC TROPONIN I PNL SERPL HS: 30 NG/L (ref 0–53)
CHLORIDE SERPL-SCNC: 100 MMOL/L (ref 98–107)
CO2 SERPL-SCNC: 29 MMOL/L (ref 21–32)
CREAT SERPL-MCNC: 4.84 MG/DL (ref 0.5–1.3)
EGFRCR SERPLBLD CKD-EPI 2021: 12 ML/MIN/1.73M*2
EOSINOPHIL # BLD AUTO: 0.1 X10*3/UL (ref 0–0.7)
EOSINOPHIL NFR BLD AUTO: 2.2 %
ERYTHROCYTE [DISTWIDTH] IN BLOOD BY AUTOMATED COUNT: 13.8 % (ref 11.5–14.5)
GLUCOSE SERPL-MCNC: 235 MG/DL (ref 74–99)
HCT VFR BLD AUTO: 23.2 % (ref 41–52)
HGB BLD-MCNC: 8 G/DL (ref 13.5–17.5)
IMM GRANULOCYTES # BLD AUTO: 0.03 X10*3/UL (ref 0–0.7)
IMM GRANULOCYTES NFR BLD AUTO: 0.6 % (ref 0–0.9)
LYMPHOCYTES # BLD AUTO: 0.72 X10*3/UL (ref 1.2–4.8)
LYMPHOCYTES NFR BLD AUTO: 15.5 %
MCH RBC QN AUTO: 27.9 PG (ref 26–34)
MCHC RBC AUTO-ENTMCNC: 34.5 G/DL (ref 32–36)
MCV RBC AUTO: 81 FL (ref 80–100)
MONOCYTES # BLD AUTO: 0.44 X10*3/UL (ref 0.1–1)
MONOCYTES NFR BLD AUTO: 9.5 %
NEUTROPHILS # BLD AUTO: 3.34 X10*3/UL (ref 1.2–7.7)
NEUTROPHILS NFR BLD AUTO: 71.8 %
NRBC BLD-RTO: 0 /100 WBCS (ref 0–0)
PLATELET # BLD AUTO: 123 X10*3/UL (ref 150–450)
POTASSIUM SERPL-SCNC: 3.9 MMOL/L (ref 3.5–5.3)
PROT SERPL-MCNC: 7.1 G/DL (ref 6.4–8.2)
RBC # BLD AUTO: 2.87 X10*6/UL (ref 4.5–5.9)
SARS-COV-2 RNA RESP QL NAA+PROBE: NOT DETECTED
SODIUM SERPL-SCNC: 137 MMOL/L (ref 136–145)
WBC # BLD AUTO: 4.7 X10*3/UL (ref 4.4–11.3)

## 2024-06-26 PROCEDURE — 83880 ASSAY OF NATRIURETIC PEPTIDE: CPT | Performed by: PHYSICIAN ASSISTANT

## 2024-06-26 PROCEDURE — 93005 ELECTROCARDIOGRAM TRACING: CPT

## 2024-06-26 PROCEDURE — 87635 SARS-COV-2 COVID-19 AMP PRB: CPT | Performed by: PHYSICIAN ASSISTANT

## 2024-06-26 PROCEDURE — 85025 COMPLETE CBC W/AUTO DIFF WBC: CPT | Performed by: EMERGENCY MEDICINE

## 2024-06-26 PROCEDURE — 71046 X-RAY EXAM CHEST 2 VIEWS: CPT

## 2024-06-26 PROCEDURE — 99285 EMERGENCY DEPT VISIT HI MDM: CPT | Performed by: PHYSICIAN ASSISTANT

## 2024-06-26 PROCEDURE — 36415 COLL VENOUS BLD VENIPUNCTURE: CPT | Performed by: EMERGENCY MEDICINE

## 2024-06-26 PROCEDURE — 84484 ASSAY OF TROPONIN QUANT: CPT | Performed by: EMERGENCY MEDICINE

## 2024-06-26 PROCEDURE — 71045 X-RAY EXAM CHEST 1 VIEW: CPT | Performed by: RADIOLOGY

## 2024-06-26 PROCEDURE — 99283 EMERGENCY DEPT VISIT LOW MDM: CPT

## 2024-06-26 PROCEDURE — 93010 ELECTROCARDIOGRAM REPORT: CPT | Performed by: PHYSICIAN ASSISTANT

## 2024-06-26 PROCEDURE — 80053 COMPREHEN METABOLIC PANEL: CPT | Performed by: EMERGENCY MEDICINE

## 2024-06-26 ASSESSMENT — ENCOUNTER SYMPTOMS
CHEST TIGHTNESS: 0
BACK PAIN: 0
FREQUENCY: 0
CONFUSION: 0
COUGH: 0
HEMATURIA: 0
VOMITING: 0
ABDOMINAL DISTENTION: 0
HEADACHES: 0
PALPITATIONS: 0
NAUSEA: 0
DIARRHEA: 0
SHORTNESS OF BREATH: 0
NERVOUS/ANXIOUS: 0

## 2024-06-26 ASSESSMENT — HEART SCORE
ECG: NORMAL
AGE: 65+
RISK FACTORS: >2 RISK FACTORS OR HX OF ATHEROSCLEROTIC DISEASE
TROPONIN: LESS THAN OR EQUAL TO NORMAL LIMIT
HISTORY: SLIGHTLY SUSPICIOUS
HEART SCORE: 4

## 2024-06-26 ASSESSMENT — PAIN DESCRIPTION - DESCRIPTORS: DESCRIPTORS: PRESSURE

## 2024-06-26 ASSESSMENT — PAIN SCALES - GENERAL
PAINLEVEL: 10-WORST PAIN EVER
PAINLEVEL_OUTOF10: 4

## 2024-06-26 ASSESSMENT — PAIN DESCRIPTION - LOCATION: LOCATION: CHEST

## 2024-06-26 ASSESSMENT — PAIN - FUNCTIONAL ASSESSMENT: PAIN_FUNCTIONAL_ASSESSMENT: 0-10

## 2024-06-26 NOTE — PROGRESS NOTES
Manolo Bashir   68 y.o.with a history of ESRD s/p prior transplant in 1988 lasted until 1992 and retransplanted in 1994 which which is currently functioning.    -Other history include MGUS with IgG and IgA lambda, diabetes, hypertension, prostate cancer status post radical prostatectomy, baseline urinary incontinence, HCV s/p treatment PCR negative as of 10/20/2023.  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  Patient received 2 doses of rituximab as of 1/7/2024.  -Had several hospital admissions because of fluid overload, hypertensive emergency .  -Recent hospital admission in May 2024 in the setting of initiation of dialysis and discharged on Tuesday Thursday Saturday dialysis at [facility].    Interim history: Complained of chest pain that started yesterday which is a in the center of the chest and feeling like pressure.  He got 2 L of fluid removed in the dialysis unit yesterday and his pressures have been running low since the dialysis..      Review of Systems   Respiratory:  Negative for cough, chest tightness and shortness of breath.    Cardiovascular:  Negative for chest pain, palpitations and leg swelling.   Gastrointestinal:  Negative for abdominal distention, diarrhea, nausea and vomiting.   Genitourinary:  Negative for frequency, hematuria and urgency.   Musculoskeletal:  Negative for back pain.   Neurological:  Negative for headaches.   Psychiatric/Behavioral:  Negative for confusion. The patient is not nervous/anxious.         Objective:  Visit Vitals  /51 (BP Location: Right arm, Patient Position: Sitting)   Pulse 70   Temp 36.6 °C (97.8 °F) (Temporal)   Resp 20   Wt 71.9 kg (158 lb 8 oz)   SpO2 100%   BMI 24.10 kg/m²   Smoking Status Never   BSA 1.86 m²      Physical Exam  HENT:      Head: Normocephalic and atraumatic.      Nose:  Nose normal.      Mouth/Throat:      Mouth: Mucous membranes are moist.   Eyes:      Extraocular Movements: Extraocular movements intact.      Pupils: Pupils are equal, round, and reactive to light.   Cardiovascular:      Rate and Rhythm: Normal rate.      Pulses: Normal pulses.      Heart sounds: Normal heart sounds.   Pulmonary:      Effort: Pulmonary effort is normal.   Abdominal:      Palpations: Abdomen is soft.      Tenderness: There is no abdominal tenderness. There is no guarding.   Musculoskeletal:         General: Normal range of motion.      Cervical back: Normal range of motion.   Skin:     General: Skin is warm.   Neurological:      General: No focal deficit present.      Mental Status: Mental status is at baseline.   Psychiatric:         Mood and Affect: Mood normal.            Current Outpatient Medications:     acetaminophen (Tylenol) 325 mg tablet, Take 3 tablets (975 mg) by mouth every 6 hours if needed (pain)., Disp: 30 tablet, Rfl: 11    aspirin 81 mg chewable tablet, Chew 1 tablet (81 mg) once daily., Disp: 30 tablet, Rfl: 11    bumetanide (Bumex) 2 mg tablet, Take 1 tablet (2 mg) by mouth 4 times a week. Take bumetanide 2mg once daily on non-dialysis days (Sunday, Monday, Wednesday, Friday), Disp: 16 tablet, Rfl: 0    calcitriol (Rocaltrol) 0.25 mcg capsule, Take 1 capsule (0.25 mcg) by mouth once daily. Do not start before December 22, 2023., Disp: 30 capsule, Rfl: 2    carvedilol (Coreg) 12.5 mg tablet, TAKE THREE (3) TABLETS BY MOUTH TWICE DAILY, Disp: 180 tablet, Rfl: 10    cinacalcet (Sensipar) 30 mg tablet, Take 1 tablet (30 mg) by mouth once daily., Disp: 30 tablet, Rfl: 11    Easy Touch Alcohol Prep Pads pads, medicated, , Disp: , Rfl:     epoetin aida 10,000 unit/mL injection, Inject 1 mL (10,000 Units) under the skin every 7 days. Do not start before April 17, 2024., Disp: 4 mL, Rfl: 11    glucagon (Gvoke HypoPen 1-Pack) 1 mg/0.2 mL auto-injector, Inject 1 mg into the muscle every 15  "minutes if needed (For blood glucose 41 to 70 mg/dL and no IV access)., Disp: 0.2 mL, Rfl: 12    hydrALAZINE (Apresoline) 100 mg tablet, Take 1 tablet (100 mg) by mouth every 8 hours., Disp: 90 tablet, Rfl: 0    insulin glargine (Lantus) 100 unit/mL injection, Inject 8 Units under the skin once every 24 hours for 2 doses. Take as directed per insulin instructions., Disp: , Rfl:     insulin lispro (HumaLOG) 100 unit/mL injection, Inject under the skin 3 times a day with meals. 100-199 2 units 200-299 4 units 300-399 6 units, Disp: , Rfl:     isosorbide mononitrate ER (Imdur) 60 mg 24 hr tablet, Take 1 tablet (60 mg) by mouth once daily., Disp: 30 tablet, Rfl: 1    lancets (Unilet Super Thin Lancets) 30 gauge misc, USE TO TEST BLOOD SUGAR THREE TIMES A DAY, Disp: 300 each, Rfl: 0    loperamide (Imodium) 2 mg capsule, Take 1 capsule (2 mg) by mouth 4 times a day as needed for diarrhea., Disp: 30 capsule, Rfl: 0    metoclopramide (Reglan) 5 mg tablet, Take 1 tablet (5 mg) by mouth 2 times a day. 1 tablet before dinner and 1 tablet at bedtime., Disp: 60 tablet, Rfl: 0    NIFEdipine ER (Adalat CC) 60 mg 24 hr tablet, Take 1 tablet (60 mg) by mouth 2 times a day. Do not crush, chew, or split., Disp: 60 tablet, Rfl: 1    pantoprazole (ProtoNix) 40 mg EC tablet, Take 1 tablet (40 mg) by mouth once daily in the morning. Take before meals. Do not crush, chew, or split. Do not start before January 22, 2024., Disp: 30 tablet, Rfl: 11    pen needle, diabetic (TechLITE Pen Needle) 32 gauge x 5/32\" needle, 1 each 4 times a day., Disp: 400 each, Rfl: 3    pravastatin (Pravachol) 10 mg tablet, Take 1 tablet (10 mg) by mouth once daily at bedtime., Disp: 90 tablet, Rfl: 1    predniSONE (Deltasone) 5 mg tablet, Take 1 tablet (5 mg) by mouth once daily in the morning. Do not start before January 22, 2024., Disp: 30 tablet, Rfl: 11    syringe with needle 3 mL 25 x 5/8\" syringe, Use to inject epogen., Disp: 7 each, Rfl: 0    tacrolimus " (Prograf) 1 mg capsule, Take 1 capsule (1 mg) by mouth 2 times a day., Disp: 60 capsule, Rfl: 11    tacrolimus (Protopic) 0.1 % ointment, Apply topically 2 times a day., Disp: 30 g, Rfl: 0    tacrolimus (Protopic) 0.1 % ointment, Apply topically 2 times a day., Disp: 60 g, Rfl: 3    Unilet Super Thin Lancets 30 gauge misc, 1 each 3 times a day., Disp: 100 each, Rfl: 3     [unfilled]     No images are attached to the encounter.     Assessment and Plan :Manolo Bashir 68 y.o.with a history of ESRD s/p prior transplant in 1988 lasted until 1992 and retransplanted in 1994 which which is currently functioning.    -Other history include MGUS with IgG and IgA lambda, diabetes, hypertension, prostate cancer status post radical prostatectomy, baseline urinary incontinence, HCV s/p treatment PCR negative as of 10/20/2023.  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  Patient received 2 doses of rituximab as of 1/7/2024.  -Had several hospital admissions because of fluid overload, hypertensive emergency .  -Recent hospital admission in May 2024 in the setting of initiation of dialysis and discharged on Tuesday Thursday Saturday dialysis at [facility].    Interim history: Complained of chest pain that started yesterday which is a in the center of the chest and feeling like pressure.  He got 2 L of fluid removed in the dialysis unit yesterday and his pressures have been running low since the dialysis..    Allograft function: Failed kidney transplant currently on dialysis Tuesday Thursday Saturday.  Need to follow-up with the local nephrologist for further management.    Immunosuppression: Can stop prednisone and continue with the low-dose tacrolimus 1 mg twice daily gradually taper in over a period  of 6 months.    Hemodynamics: Blood pressures are  soft directed him to the ER for further management need further adjustment of blood pressure pills.    Anemia and bone mineral disease to be managed by local nephrologist    General health care: Recommended age-appropriate screening, COVID shots annual dermatology visits,DEXA scan 2-3 yrs while on steroids .    Labs in 3 months and follow-up in 6 months in the transplant [place] and need close follow-up with local nephrologist..  Patient was directed to the ER for management of the hypertension and chest pressure    Dina Benoit MD    Notes created by Antoine -Please excuse the Typos .

## 2024-06-26 NOTE — ED TRIAGE NOTES
Pt to ED with c/o CP, lightheaded and low BP. Pt states he checked his BP at home and it was low. Pt also was just seen at PCP and it was 100/51. Pt also on dialysis treatments are on T/Th/Sat, last full tx yesterday. Pt endorsing midsternal CP that does not radiate 4/10 pressure in description. Pt denies any SOB.

## 2024-06-26 NOTE — ED PROVIDER NOTES
"This is a 68-year-old male history of prior ESRD on HD now s/p 2 time renal transplant (1992, 2013), now with impaired allograft function not currently on HD, CKD 3, on immunosuppression (prednisone, tacrolimus, azathioprine), history of IgG and IgA lambda MGUS (recent hematologic eval including Bmbx with no e/o myeloma), DM2, HTN, prostate cancer s/p radical prostatectomy, baseline urinary incontinence, HCV s/p rx (PCR neg 10/2023) presenting to the ED with chest pain and lightheadedness.  He states that when he woke up this morning he was feeling lightheaded/dizzy.  His symptoms were worse when going from sitting to standing.  He checked his blood pressure and it was 104/37.  He also has some associated chest pain that he describes a pressure sensation in his midsternal region.  The pain did not radiate.  No associated diaphoresis or shortness of breath.  Additionally patient endorses having a dry cough at nighttime as well as night sweats over the past 1 week.  He denies any productive cough.  He denies any measured fevers or chills.  He denies any lower extremity swelling or pain.  He denies recent falls or injury.  Patient does state that he went to his transplant nephrologist this morning and was sent to the ED for further evaluation due to his blood pressure being 100/51, him endorsing this lightheadedness as well as his chest pain.  Patient does state that he drank a large amount of salty water at home for this lightheadedness.  He also states that he recently restarted dialysis approximately 1 month ago and they are still trying to figure out what his dry weight is.  He states that previously they had only been taking off 1 L, however yesterday they took off 2 L.      History provided by:  Patient and medical records   used: No             Visit Vitals  /76   Pulse 65   Temp 37.2 °C (99 °F) (Temporal)   Resp 20   Ht 1.727 m (5' 8\")   Wt 71.7 kg (158 lb)   SpO2 100%   BMI 24.02 " kg/m²   Smoking Status Never   BSA 1.85 m²          Physical Exam     Physical exam:   General: Vitals noted, no distress. Afebrile.   EENT:  Hearing grossly intact. Normal phonation. MMM. Airway patient. PERRL. EOMI.   Neck: No midline tenderness or paraspinal tenderness. FROM.   Cardiac: Regular, rate, rhythm. Normal S1 and S2.  No murmurs, gallops, rubs.   Pulmonary: Good air exchange. Lungs clear bilaterally. No wheezes, rhonchi, rales. No accessory muscle use.   Abdomen: Soft, nonsurgical. Nontender. No peritoneal signs. Normoactive bowel sounds.   Back: No CVA tenderness. No midline tenderness or paraspinal tenderness. No obvious deformity.   Extremities: No peripheral edema.  Full range of motion. Moves all extremities freely. No tenderness throughout extremities.   Skin: No rash. Warm and Dry.   Neuro: No focal neurologic deficits. CN 2-12 grossly intact. Sensation equal bilaterally. No weakness.         Labs Reviewed   CBC WITH AUTO DIFFERENTIAL - Abnormal       Result Value    WBC 4.7      nRBC 0.0      RBC 2.87 (*)     Hemoglobin 8.0 (*)     Hematocrit 23.2 (*)     MCV 81      MCH 27.9      MCHC 34.5      RDW 13.8      Platelets 123 (*)     Neutrophils % 71.8      Immature Granulocytes %, Automated 0.6      Lymphocytes % 15.5      Monocytes % 9.5      Eosinophils % 2.2      Basophils % 0.4      Neutrophils Absolute 3.34      Immature Granulocytes Absolute, Automated 0.03      Lymphocytes Absolute 0.72 (*)     Monocytes Absolute 0.44      Eosinophils Absolute 0.10      Basophils Absolute 0.02     COMPREHENSIVE METABOLIC PANEL - Abnormal    Glucose 235 (*)     Sodium 137      Potassium 3.9      Chloride 100      Bicarbonate 29      Anion Gap 12      Urea Nitrogen 30 (*)     Creatinine 4.84 (*)     eGFR 12 (*)     Calcium 9.2      Albumin 3.5      Alkaline Phosphatase 94      Total Protein 7.1      AST 14      Bilirubin, Total 0.4      ALT 9 (*)    B-TYPE NATRIURETIC PEPTIDE - Abnormal     (*)      Narrative:        <100 pg/mL - Heart failure unlikely  100-299 pg/mL - Intermediate probability of acute heart                  failure exacerbation. Correlate with clinical                  context and patient history.    >=300 pg/mL - Heart Failure likely. Correlate with clinical                  context and patient history.     Biotin interference may cause falsely decreased results. Patients taking a Biotin dose of up to 5 mg/day should refrain from taking Biotin for 24 hours before sample  collection. Providers may contact their local laboratory for further information.   SERIAL TROPONIN-INITIAL - Normal    Troponin I, High Sensitivity ([facility]) 30      Narrative:     Less than 99th percentile of normal range cutoff-  Female and children under 18 years old <35 ng/L; Male <54 ng/L: Negative  Repeat testing should be performed if clinically indicated.     Female and children under 18 years old  ng/L; Male  ng/L:  Consistent with possible cardiac damage and possible increased clinical   risk. Serial measurements may help to assess extent of myocardial damage.     >120 ng/L: Consistent with cardiac damage, increased clinical risk and  myocardial infarction. Serial measurements may help assess extent of   myocardial damage.      NOTE: Children less than 1 year old may have higher baseline troponin   levels and results should be interpreted in conjunction with the overall   clinical context.    NOTE: Troponin I testing is performed using a different   testing methodology at [facility] than at other   [facility]. Direct result comparisons should only   be made within the same method.     SERIAL TROPONIN, 1 HOUR - Normal    Troponin I, High Sensitivity (CMC) 28      Narrative:     Less than 99th percentile of normal range cutoff-  Female and children under 18 years old <35 ng/L; Male <54 ng/L: Negative  Repeat testing should be performed if clinically indicated.     Female and children under  18 years old  ng/L; Male  ng/L:  Consistent with possible cardiac damage and possible increased clinical   risk. Serial measurements may help to assess extent of myocardial damage.     >120 ng/L: Consistent with cardiac damage, increased clinical risk and  myocardial infarction. Serial measurements may help assess extent of   myocardial damage.      NOTE: Children less than 1 year old may have higher baseline troponin   levels and results should be interpreted in conjunction with the overall   clinical context.    NOTE: Troponin I testing is performed using a different   testing methodology at [facility] than at other   [facility]. Direct result comparisons should only   be made within the same method.     SARS-COV-2 PCR - Normal    Coronavirus 2019, PCR Not Detected      Narrative:     This assay has received FDA Emergency Use Authorization (EUA) and is only authorized for the duration of time that circumstances exist to justify the authorization of the emergency use of in vitro diagnostic tests for the detection of SARS-CoV-2 virus and/or diagnosis of COVID-19 infection under section 564(b)(1) of the Act, 21 U.S.C. 360bbb-3(b)(1). This assay is an in vitro diagnostic nucleic acid amplification test for the qualitative detection of SARS-CoV-2 from nasopharyngeal specimens and has been validated for use at [facility]. Negative results do not preclude COVID-19 infections and should not be used as the sole basis for diagnosis, treatment, or other management decisions.     TROPONIN SERIES- (INITIAL, 1 HR)    Narrative:     The following orders were created for panel order Troponin I Series, High Sensitivity (0, 1 HR).  Procedure                               Abnormality         Status                     ---------                               -----------         ------                     Troponin I, High Sensiti...[[id number]]  Normal              Final result                Troponin, High Sensitivi...[[id number]]  Normal              Final result                 Please view results for these tests on the individual orders.       XR chest 2 views   Final Result   1. Central bibasilar opacities which could relate to edema,   atelectasis, and/or pneumonitis.   2. Possible trace right pleural effusion.   3. Note of kinking of the left subclavian stent. Knowledge of any   clinical findings to suggest compromise may be helpful. Dedicated   imaging may be helpful if not already been assessed.        MACRO:   None        Signed by: Dno Larson 6/26/2024 10:06 AM   Dictation workstation:   [id number]            ED Course & MDM     Medical Decision Making  This is a 68-year-old male with past medical history of ESRD on dialysis who presents to the ED with chest pain as well as lightheadedness that began this morning.  Vital stable upon arrival to the ED.  Orthostatic vital signs obtained and were within normal limits.  IV established laboratory studies obtained including CBC, CMP, troponin as well as a COVID swab due to patient endorsing a dry cough.  Chest x-ray ordered.  Patient discussed with attending physician.  EKG obtained and showed no acute ischemic changes, there was a left bundle branch block, however this appeared to be grossly unchanged from previous EKGs.  On reevaluation patient states all of his symptoms have resolved.  Repeat vitals grossly unremarkable.  Troponin within normal limits x 2.  BNP slightly elevated at 687, however this appears to be consistent with previous laboratory studies.  COVID swab negative.  CMP did show patient's known elevated creatinine of 4.84, however was otherwise grossly unremarkable.  CBC showed his hemoglobin was 8.0, however this appears to be consistent with his baseline.  Chest x-ray showed central basilar opacities consistent with edema as well as a possible trace right pleural effusion.  There was kinking of the left subclavian  stent.  Patient denied any arm or shoulder pain.  Old results reviewed and this appears to have been present in May when the patient was here as well.  He was advised to follow-up with his vascular doctor as an outpatient concerning this problem.  He was agreeable to this plan.  At this point in time patient felt comfortable being discharged home.  Low suspicion for ACS at this time based on patient's description of his symptoms as well as his normal troponin and unchanged EKG.  He does have a primary care provider that he follows with regularly and was advised to follow-up close with them.  He was given signs and symptoms that he should return to the ED with.  He was agreeable to this plan.  He had no further questions at this time and was discharged from the ED in stable condition.    Amount and/or Complexity of Data Reviewed  Labs: ordered.  Radiology: ordered and independent interpretation performed.     Details: Small mount of pulmonary edema without any visualized consolidations.  ECG/medicine tests: ordered and independent interpretation performed.     Details: EKG normal sinus rhythm at 68 bpm with a left bundle branch block.  Unchanged from previous EKGs.         Diagnoses as of 06/26/24 1335   Chest pain, unspecified type   Lightheadedness       Procedures    EULALIO Bhatti, PAJean PierreC     Patricia Harry PA-C  06/26/24 1336

## 2024-06-26 NOTE — PROGRESS NOTES
"Patient blood pressure during clinic visit was 100/51: HR: 70. Patient states that his home blood pressure reading was also on the low side: 104/37. States that blood pressure normal runs in the 140's systolic's. He has dialysis 3x weekly (Tuesday, Thursday and Saturday. He is reporting \"weakness\" and chest pain/pressure. Instructed by Dr. Arroyo to be evaluated in the Emergency Room. He was escorted by family to the ED.   "

## 2024-06-27 ENCOUNTER — PATIENT OUTREACH (OUTPATIENT)
Dept: CARE COORDINATION | Facility: CLINIC | Age: 69
End: 2024-06-27
Payer: COMMERCIAL

## 2024-06-27 LAB
ATRIAL RATE: 68 BPM
P AXIS: 78 DEGREES
P OFFSET: 187 MS
P ONSET: 128 MS
PR INTERVAL: 174 MS
Q ONSET: 215 MS
QRS COUNT: 11 BEATS
QRS DURATION: 146 MS
QT INTERVAL: 442 MS
QTC CALCULATION(BAZETT): 469 MS
QTC FREDERICIA: 461 MS
R AXIS: 28 DEGREES
T AXIS: 38 DEGREES
T OFFSET: 436 MS
VENTRICULAR RATE: 68 BPM

## 2024-06-27 PROCEDURE — RXMED WILLOW AMBULATORY MEDICATION CHARGE

## 2024-06-27 NOTE — PROGRESS NOTES
Outreach call to patient to support a smooth transition of care from recent admission.  Unable to leave a  voicemail message for patient with my contact information.

## 2024-06-29 ENCOUNTER — PHARMACY VISIT (OUTPATIENT)
Dept: PHARMACY | Facility: CLINIC | Age: 69
End: 2024-06-29
Payer: MEDICAID

## 2024-07-03 ENCOUNTER — OFFICE VISIT (OUTPATIENT)
Dept: PRIMARY CARE | Facility: CLINIC | Age: 69
End: 2024-07-03
Payer: COMMERCIAL

## 2024-07-03 VITALS
SYSTOLIC BLOOD PRESSURE: 111 MMHG | TEMPERATURE: 98.3 F | DIASTOLIC BLOOD PRESSURE: 46 MMHG | BODY MASS INDEX: 23.52 KG/M2 | RESPIRATION RATE: 16 BRPM | WEIGHT: 155.2 LBS | OXYGEN SATURATION: 100 % | HEIGHT: 68 IN | HEART RATE: 68 BPM

## 2024-07-03 DIAGNOSIS — Z94.0 KIDNEY REPLACED BY TRANSPLANT (HHS-HCC): Primary | ICD-10-CM

## 2024-07-03 DIAGNOSIS — R07.9 CHEST PAIN, UNSPECIFIED TYPE: ICD-10-CM

## 2024-07-03 PROCEDURE — 3062F POS MACROALBUMINURIA REV: CPT | Performed by: NURSE PRACTITIONER

## 2024-07-03 PROCEDURE — 1159F MED LIST DOCD IN RCRD: CPT | Performed by: NURSE PRACTITIONER

## 2024-07-03 PROCEDURE — RXMED WILLOW AMBULATORY MEDICATION CHARGE

## 2024-07-03 PROCEDURE — 3051F HG A1C>EQUAL 7.0%<8.0%: CPT | Performed by: NURSE PRACTITIONER

## 2024-07-03 PROCEDURE — 1036F TOBACCO NON-USER: CPT | Performed by: NURSE PRACTITIONER

## 2024-07-03 PROCEDURE — 99214 OFFICE O/P EST MOD 30 MIN: CPT | Performed by: NURSE PRACTITIONER

## 2024-07-03 PROCEDURE — 3074F SYST BP LT 130 MM HG: CPT | Performed by: NURSE PRACTITIONER

## 2024-07-03 PROCEDURE — 3078F DIAST BP <80 MM HG: CPT | Performed by: NURSE PRACTITIONER

## 2024-07-03 PROCEDURE — 1125F AMNT PAIN NOTED PAIN PRSNT: CPT | Performed by: NURSE PRACTITIONER

## 2024-07-03 PROCEDURE — 3008F BODY MASS INDEX DOCD: CPT | Performed by: NURSE PRACTITIONER

## 2024-07-03 RX ORDER — ISOSORBIDE MONONITRATE 60 MG/1
60 TABLET, EXTENDED RELEASE ORAL DAILY
Qty: 90 TABLET | Refills: 3 | Status: SHIPPED | OUTPATIENT
Start: 2024-07-03 | End: 2025-07-03

## 2024-07-03 SDOH — ECONOMIC STABILITY: FOOD INSECURITY: WITHIN THE PAST 12 MONTHS, YOU WORRIED THAT YOUR FOOD WOULD RUN OUT BEFORE YOU GOT MONEY TO BUY MORE.: NEVER TRUE

## 2024-07-03 SDOH — ECONOMIC STABILITY: FOOD INSECURITY: WITHIN THE PAST 12 MONTHS, THE FOOD YOU BOUGHT JUST DIDN'T LAST AND YOU DIDN'T HAVE MONEY TO GET MORE.: NEVER TRUE

## 2024-07-03 ASSESSMENT — ENCOUNTER SYMPTOMS
DEPRESSION: 0
OCCASIONAL FEELINGS OF UNSTEADINESS: 0
LOSS OF SENSATION IN FEET: 0

## 2024-07-03 ASSESSMENT — PAIN SCALES - GENERAL: PAINLEVEL: 6

## 2024-07-03 NOTE — PATIENT INSTRUCTIONS
Good to see you today. I renewed your isosorbide and your renal caps. We discussed your weight loss and getting a goal from dialysis for fluid intake.   I would like to see you again in 3 months.

## 2024-07-03 NOTE — PROGRESS NOTES
Subjective   Patient ID: Manolo Bashir is a 68 y.o. male who presents for Follow-up.    HPI   Patient has a PMH of ESRD, on HD [id number], Renal transplant on 3/23/1994 failed in 2006, followed by hemodialysis and a second transplant 7/13/2013), , HTN, DM2, h/o Hep C with treatment, prostate CA s/p radical prostatectomy. Now on dialysis for the past month,  3 days a week T-Th-Sat. Continues to make urine emptying bladder 3-4 times a day. When he is thirsty he eats ice, but isn't sure what his fluid limit would be and avoids liquids.   Is not tolerating dialysis well. Has had issues with low BP and feels terrible after the treatments. Is hoping to get onto the transplant list again.   He is losing weight since on hemodialysis, in the past average weight was 175-180, today is 155. He reports a poor appetite and continues to drink Boost twice a day.   Since last seen by me in March was seen in the [facility]plant [place] and had an A-V graft done..  In April he was evaluated for pancytopenia, to follow up with Heme/onc in October.  He was seen in the ED for dizziness and low BP on 4/3/24 and diagnosed with pulmonary edema. He was hospitalized for 3 days, BP meds were adjusted and he was started on darbepoetin.   Also seen 4/22/24 by GI for abdominal pain. Concern for possible gastroparesis. Was to have GES and started on low dose Reglan. Follow up in May and reported feeling much better. No complaint of abd pain today.   He was seen in the ED again 5/11/24 and hospitalized for pneumonia for 7 days. At discharge started on scheduled Hemodialysis.     Today he is requesting a refill of his isosorbide and Renal caps.   Reports he was not aware of his appointment with cardiology on 6/11/2024. Discussed need to schedule a follow up visit.     Review of Systems  12 point review of systems including (Constitutional, Eyes, ENMT, Respiratory, Cardiac, Gastrointestinal, Neurological, Psychiatric, and Hematologic) was performed and  "is otherwise negative     Objective   BP (!) 111/46   Pulse 68   Temp 36.8 °C (98.3 °F)   Resp 16   Ht 1.727 m (5' 8\")   Wt 70.4 kg (155 lb 3.2 oz)   SpO2 100%   BMI 23.60 kg/m²     Physical Exam  General: Well groomed. Mood appropriate.  HEENT: MMM Chest: CTA  Heart: RRR  Ext: no edema  MS: adequate ROM and strength upper and lower extremities.  Skin: warm, moist, intact     Assessment/Plan   Diagnoses and all orders for this visit:  Kidney replaced by transplant ([facility])  -     B complex-vitamin C-folic acid (Nephrocaps) 1 mg capsule; Take 1 capsule by mouth once daily.  Chest pain, unspecified type  -     isosorbide mononitrate ER (Imdur) 60 mg 24 hr tablet; Take 1 tablet (60 mg) by mouth once daily.  Other orders  -     Follow Up In Primary Care - Established; Future       "

## 2024-07-04 ENCOUNTER — PHARMACY VISIT (OUTPATIENT)
Dept: PHARMACY | Facility: CLINIC | Age: 69
End: 2024-07-04
Payer: MEDICAID

## 2024-07-09 ENCOUNTER — DOCUMENTATION (OUTPATIENT)
Dept: TRANSPLANT | Facility: HOSPITAL | Age: 69
End: 2024-07-09
Payer: COMMERCIAL

## 2024-07-09 DIAGNOSIS — Z01.818 PRE-TRANSPLANT EVALUATION FOR KIDNEY TRANSPLANT: ICD-10-CM

## 2024-07-09 NOTE — PROGRESS NOTES
Eval Clinic Note:  Patient attended evaluation appts on 07/10/2024 with Dr. Rolle. Medications and allergies reviewed with patient.  Patient presented to appointment with his wife. Patient ambulated independently.    History:  Patient has a history of kidney tx x2, MGUS, cholecystectomy, complication of kidney tx requiring ostomy and subsequent reversal, prostate CA s/p prostatectomy, T2DM, HTN, s/p HCV w/treatment (remote).  Dialysis:  [facility], T-Th-Sat, started this year.  Testing completed recently:  Echo, cardiac MRI, EKG, colonoscopy  Testing needed for evaluation:  Cardiac risk stratification, virtual finance, SW new patient, surgeon new patient and cardiac score  Listing Consent:  KDPI >85%, DCD, Hep C, Hep B  Comments:  Provided patient with my contact info for questions and concerns.       LISTING EDUCATION    Patient educated regarding the following prior to placement on the transplant waiting list:   The patient?s medical condition, prognosis, and treatment plan.   The expectations and patient responsibilities while on the waiting list, including:   Keeping the transplant center informed of any changes in contact information or insurance coverage   Notifying the transplant center of any changes in medical status   Required testing and/or re-evaluation appointments while awaiting transplant   An overview of the surgical procedure, including potential risks and alternatives.   Information regarding what to expect during the inpatient admission and recovery period.   A discussion regarding organ offers and types of potential donors, including potential risks that may be associated with specific types of donors that could affect the success of the transplant or the health of the patient.  The right to refuse transplantation.     Patient was given the opportunity to have questions answered. Patient was provided a copy of the informed consent for transplant listing.    Education provided  by:  Transplant Coordinator: Rosie Mcqueen RN  Transplant Physician:  Raad Rolle MD      Signed listing informed consent received?   Patient agrees to be listed for the following:  KDPI > 85% [X]  Donors After Circulatory Death (DCD) [X]  Donors with a Positive Core Antibody for Hepatitis B - if immune [X]  Donors with Hepatitis C Virus to recipients with hepatitis C [X]  Donors with Hepatitis C Virus to Negative hepatitis C recipients []    Patient will be discussed at an upcoming selection committee to determine eligibility to be placed on the [facility] waiting list.

## 2024-07-10 ENCOUNTER — LAB (OUTPATIENT)
Dept: LAB | Facility: LAB | Age: 69
End: 2024-07-10
Payer: COMMERCIAL

## 2024-07-10 ENCOUNTER — OFFICE VISIT (OUTPATIENT)
Dept: TRANSPLANT | Facility: HOSPITAL | Age: 69
End: 2024-07-10
Payer: COMMERCIAL

## 2024-07-10 ENCOUNTER — APPOINTMENT (OUTPATIENT)
Dept: SLEEP MEDICINE | Facility: HOSPITAL | Age: 69
End: 2024-07-10
Payer: COMMERCIAL

## 2024-07-10 ENCOUNTER — EDUCATION (OUTPATIENT)
Dept: TRANSPLANT | Facility: HOSPITAL | Age: 69
End: 2024-07-10
Payer: COMMERCIAL

## 2024-07-10 ENCOUNTER — LAB REQUISITION (OUTPATIENT)
Dept: LAB | Facility: CLINIC | Age: 69
End: 2024-07-10
Payer: COMMERCIAL

## 2024-07-10 VITALS
TEMPERATURE: 96.7 F | OXYGEN SATURATION: 99 % | HEIGHT: 68 IN | SYSTOLIC BLOOD PRESSURE: 119 MMHG | DIASTOLIC BLOOD PRESSURE: 60 MMHG | WEIGHT: 158 LBS | HEART RATE: 68 BPM | BODY MASS INDEX: 23.95 KG/M2 | RESPIRATION RATE: 16 BRPM

## 2024-07-10 DIAGNOSIS — Z01.818 PRE-TRANSPLANT EVALUATION FOR KIDNEY TRANSPLANT: ICD-10-CM

## 2024-07-10 DIAGNOSIS — Z94.0 KIDNEY REPLACED BY TRANSPLANT (HHS-HCC): Primary | ICD-10-CM

## 2024-07-10 DIAGNOSIS — Z94.0 KIDNEY TRANSPLANTED (HHS-HCC): ICD-10-CM

## 2024-07-10 DIAGNOSIS — N18.6 END STAGE RENAL DISEASE (MULTI): ICD-10-CM

## 2024-07-10 DIAGNOSIS — N18.6 ESRD (END STAGE RENAL DISEASE) (MULTI): ICD-10-CM

## 2024-07-10 DIAGNOSIS — Z94.0 KIDNEY REPLACED BY TRANSPLANT (HHS-HCC): ICD-10-CM

## 2024-07-10 LAB
ABO GROUP (TYPE) IN BLOOD: NORMAL
ALBUMIN SERPL BCP-MCNC: 3.5 G/DL (ref 3.4–5)
ALP SERPL-CCNC: 80 U/L (ref 33–136)
ALT SERPL W P-5'-P-CCNC: 12 U/L (ref 10–52)
AMYLASE SERPL-CCNC: 21 U/L (ref 29–103)
AST SERPL W P-5'-P-CCNC: 22 U/L (ref 9–39)
BASOPHILS # BLD AUTO: 0.02 X10*3/UL (ref 0–0.1)
BASOPHILS NFR BLD AUTO: 0.6 %
BILIRUB DIRECT SERPL-MCNC: 0.1 MG/DL (ref 0–0.3)
BILIRUB SERPL-MCNC: 0.4 MG/DL (ref 0–1.2)
BUN SERPL-MCNC: 30 MG/DL (ref 6–23)
C PEPTIDE SERPL-MCNC: 4.4 NG/ML (ref 0.7–3.9)
CHOLEST SERPL-MCNC: 96 MG/DL (ref 0–199)
CHOLESTEROL/HDL RATIO: 2.2
CMV IGG AVIDITY SERPL IA-RTO: REACTIVE %
CREAT SERPL-MCNC: 4.8 MG/DL (ref 0.5–1.3)
EBV EA IGG SER QL: POSITIVE
EBV NA AB SER QL: POSITIVE
EBV VCA IGG SER IA-ACNC: POSITIVE
EBV VCA IGM SER IA-ACNC: NEGATIVE
EGFRCR SERPLBLD CKD-EPI 2021: 13 ML/MIN/1.73M*2
EOSINOPHIL # BLD AUTO: 0.08 X10*3/UL (ref 0–0.7)
EOSINOPHIL NFR BLD AUTO: 2.2 %
ERYTHROCYTE [DISTWIDTH] IN BLOOD BY AUTOMATED COUNT: 15.2 % (ref 11.5–14.5)
EST. AVERAGE GLUCOSE BLD GHB EST-MCNC: 143 MG/DL
FLOW AUTOCROSSMATCH: NORMAL
HBA1C MFR BLD: 6.6 %
HBV CORE AB SER QL: NONREACTIVE
HBV SURFACE AB SER-ACNC: 3.2 MIU/ML
HBV SURFACE AG SERPL QL IA: NONREACTIVE
HCT VFR BLD AUTO: 25.6 % (ref 41–52)
HCV AB SER QL: REACTIVE
HCYS SERPL-SCNC: 18.08 UMOL/L (ref 5–13.9)
HDLC SERPL-MCNC: 44.3 MG/DL
HGB BLD-MCNC: 8.2 G/DL (ref 13.5–17.5)
HIV 1+2 AB+HIV1 P24 AG SERPL QL IA: NONREACTIVE
HLA CLASS I AB SCREEN,FC: NORMAL
HLA CLASS II AB SCREEN,FC: NORMAL
HLA CLS I TYP PNL BLD/T DONR HIGH RES: NORMAL
HLA RESULTS: NORMAL
HLA-DP2 QL: NORMAL
HLA-DQB1 HIGH RES: NORMAL
HLA-DRB1 HIGH RES: NORMAL
IMM GRANULOCYTES # BLD AUTO: 0.01 X10*3/UL (ref 0–0.7)
IMM GRANULOCYTES NFR BLD AUTO: 0.3 % (ref 0–0.9)
LYMPHOCYTES # BLD AUTO: 0.53 X10*3/UL (ref 1.2–4.8)
LYMPHOCYTES NFR BLD AUTO: 14.8 %
MCH RBC QN AUTO: 28.3 PG (ref 26–34)
MCHC RBC AUTO-ENTMCNC: 32 G/DL (ref 32–36)
MCV RBC AUTO: 88 FL (ref 80–100)
MONOCYTES # BLD AUTO: 0.38 X10*3/UL (ref 0.1–1)
MONOCYTES NFR BLD AUTO: 10.6 %
NEUTROPHILS # BLD AUTO: 2.56 X10*3/UL (ref 1.2–7.7)
NEUTROPHILS NFR BLD AUTO: 71.5 %
NON-HDL CHOLESTEROL: 52 MG/DL (ref 0–149)
NRBC BLD-RTO: 0 /100 WBCS (ref 0–0)
PHOSPHATE SERPL-MCNC: 2.9 MG/DL (ref 2.5–4.9)
PLATELET # BLD AUTO: 117 X10*3/UL (ref 150–450)
PROT SERPL-MCNC: 6.8 G/DL (ref 6.4–8.2)
RBC # BLD AUTO: 2.9 X10*6/UL (ref 4.5–5.9)
RH FACTOR (ANTIGEN D): NORMAL
TREPONEMA PALLIDUM IGG+IGM AB [PRESENCE] IN SERUM OR PLASMA BY IMMUNOASSAY: NONREACTIVE
VARICELLA ZOSTER IGG INDEX: 4.2 IA
VZV IGG SER QL IA: POSITIVE
WBC # BLD AUTO: 3.6 X10*3/UL (ref 4.4–11.3)

## 2024-07-10 PROCEDURE — 84681 ASSAY OF C-PEPTIDE: CPT

## 2024-07-10 PROCEDURE — 85025 COMPLETE CBC W/AUTO DIFF WBC: CPT

## 2024-07-10 PROCEDURE — 1125F AMNT PAIN NOTED PAIN PRSNT: CPT | Performed by: INTERNAL MEDICINE

## 2024-07-10 PROCEDURE — 82565 ASSAY OF CREATININE: CPT

## 2024-07-10 PROCEDURE — 86663 EPSTEIN-BARR ANTIBODY: CPT

## 2024-07-10 PROCEDURE — 86787 VARICELLA-ZOSTER ANTIBODY: CPT

## 2024-07-10 PROCEDURE — 86900 BLOOD TYPING SEROLOGIC ABO: CPT

## 2024-07-10 PROCEDURE — 80076 HEPATIC FUNCTION PANEL: CPT

## 2024-07-10 PROCEDURE — 86481 TB AG RESPONSE T-CELL SUSP: CPT

## 2024-07-10 PROCEDURE — 3074F SYST BP LT 130 MM HG: CPT | Performed by: INTERNAL MEDICINE

## 2024-07-10 PROCEDURE — 86901 BLOOD TYPING SEROLOGIC RH(D): CPT

## 2024-07-10 PROCEDURE — 86780 TREPONEMA PALLIDUM: CPT

## 2024-07-10 PROCEDURE — 84153 ASSAY OF PSA TOTAL: CPT

## 2024-07-10 PROCEDURE — 87389 HIV-1 AG W/HIV-1&-2 AB AG IA: CPT

## 2024-07-10 PROCEDURE — 81379 HLA I TYPING COMPLETE HR: CPT | Mod: OUT | Performed by: SURGERY

## 2024-07-10 PROCEDURE — 80307 DRUG TEST PRSMV CHEM ANLYZR: CPT

## 2024-07-10 PROCEDURE — 82465 ASSAY BLD/SERUM CHOLESTEROL: CPT

## 2024-07-10 PROCEDURE — 83090 ASSAY OF HOMOCYSTEINE: CPT

## 2024-07-10 PROCEDURE — 83036 HEMOGLOBIN GLYCOSYLATED A1C: CPT

## 2024-07-10 PROCEDURE — 86665 EPSTEIN-BARR CAPSID VCA: CPT

## 2024-07-10 PROCEDURE — 86664 EPSTEIN-BARR NUCLEAR ANTIGEN: CPT

## 2024-07-10 PROCEDURE — 86706 HEP B SURFACE ANTIBODY: CPT

## 2024-07-10 PROCEDURE — 82150 ASSAY OF AMYLASE: CPT

## 2024-07-10 PROCEDURE — 84154 ASSAY OF PSA FREE: CPT

## 2024-07-10 PROCEDURE — 84100 ASSAY OF PHOSPHORUS: CPT

## 2024-07-10 PROCEDURE — 87522 HEPATITIS C REVRS TRNSCRPJ: CPT

## 2024-07-10 PROCEDURE — 99215 OFFICE O/P EST HI 40 MIN: CPT | Performed by: INTERNAL MEDICINE

## 2024-07-10 PROCEDURE — 86803 HEPATITIS C AB TEST: CPT

## 2024-07-10 PROCEDURE — 86644 CMV ANTIBODY: CPT

## 2024-07-10 PROCEDURE — 3062F POS MACROALBUMINURIA REV: CPT | Performed by: INTERNAL MEDICINE

## 2024-07-10 PROCEDURE — 86704 HEP B CORE ANTIBODY TOTAL: CPT

## 2024-07-10 PROCEDURE — 80323 ALKALOIDS NOS: CPT

## 2024-07-10 PROCEDURE — 3078F DIAST BP <80 MM HG: CPT | Performed by: INTERNAL MEDICINE

## 2024-07-10 PROCEDURE — 87340 HEPATITIS B SURFACE AG IA: CPT

## 2024-07-10 PROCEDURE — 83718 ASSAY OF LIPOPROTEIN: CPT

## 2024-07-10 PROCEDURE — 86825 HLA X-MATH NON-CYTOTOXIC: CPT | Mod: OUT | Performed by: SURGERY

## 2024-07-10 PROCEDURE — 36415 COLL VENOUS BLD VENIPUNCTURE: CPT

## 2024-07-10 PROCEDURE — 86832 HLA CLASS I HIGH DEFIN QUAL: CPT | Mod: OUT | Performed by: SURGERY

## 2024-07-10 PROCEDURE — 3008F BODY MASS INDEX DOCD: CPT | Performed by: INTERNAL MEDICINE

## 2024-07-10 PROCEDURE — 84520 ASSAY OF UREA NITROGEN: CPT

## 2024-07-10 PROCEDURE — 3044F HG A1C LEVEL LT 7.0%: CPT | Performed by: INTERNAL MEDICINE

## 2024-07-10 ASSESSMENT — PAIN SCALES - GENERAL: PAINLEVEL: 7

## 2024-07-10 NOTE — PATIENT INSTRUCTIONS
Thank You for coming to [facility] [place].  You are currently in evaluation for kidney transplant.  In order to be eligible to be placed on the wait list you must complete certain tests and appointments.  The following tests/appointments will be scheduled for you:      -CT cardiac score  -Surgeon new patient visit  -SW new patient visit  -Financial counselor phone appointment  -Cardiology consultation    Please complete the following testing with your primary care doctor:    None    Please call [phone] with any questions or if you need assistance.    Rosie Mcqueen, MSN RN  Pre-KidneyTransplant Coordinator

## 2024-07-10 NOTE — PROGRESS NOTES
Patient attended class on 07/10/24.  Accompanied to class with female support.  Oral and written education was provided.  Patient remained attentive throughout the review session, asking appropriate questions.  Evaluation consents were signed.    PRE-TRANSPLANT EDUCATION  Patient received education regarding the following topics as part of their pre-transplant evaluation:  The evaluation process, including:  Transplant team members and roles    Required consultations and testing  Selection criteria and suitability for transplant  Listing process and receiving an organ offer  Psychosocial and financial considerations for a successful transplant  Patient responsibilities, including the necessity of adhering to a strict medical regimen  An overview of the surgical procedure   Potential medical, surgical, and psychosocial risks to transplantation, including:  Wound infection  Pneumonia  Blood clot formation  Organ rejection, failure, and possibility of re-transplantation  Lifetime immunosuppression therapy and associated risks  Arrhythmias and cardiovascular collapse  Multi-organ system failure  Death  Depression  Post-Traumatic Stress Disorder  Generalized anxiety, issues of dependence, and feelings of guilt  Available alternatives to transplantation  Donor risk factors that could affect the success of the transplant and the health of the patient, including:  Donor age  Donor medical and social history  Condition of the organ  Risk of tiffanie cancer, HIV, Hepatitis B, Hepatitis C, or malaria if the infection is not detectable at the time of donation  Patient?s right to withdraw consent for transplantation at any time during the process  Transplants not performed in a Medicare-approved transplant center could affect the patient?s ability to have immunosuppression medication paid for under Medicare part B.   Multiple listing options.    Patient was given the opportunity to have questions answered. Patient was  provided a copy of the informed consent for transplant evaluation.    Signed evaluation informed consent received? Yes

## 2024-07-11 ENCOUNTER — TELEPHONE (OUTPATIENT)
Dept: TRANSPLANT | Facility: HOSPITAL | Age: 69
End: 2024-07-11
Payer: COMMERCIAL

## 2024-07-11 LAB
AMPHETAMINES SERPL QL SCN: NEGATIVE NG/ML
ANNOTATION COMMENT IMP: NORMAL
BARBITURATES SERPL QL SCN: NEGATIVE NG/ML
BENZODIAZ SERPL QL SCN: NEGATIVE NG/ML
BUPRENORPHINE SERPL-MCNC: NEGATIVE NG/ML
CANNABINOIDS SERPL QL SCN: NEGATIVE NG/ML
COCAINE SERPL QL SCN: NEGATIVE NG/ML
HCV RNA SERPL NAA+PROBE-ACNC: NOT DETECTED K[IU]/ML
HCV RNA SERPL NAA+PROBE-LOG IU: NORMAL {LOG_IU}/ML
HLA RESULTS: NORMAL
HLA-A+B+C AB NFR SER: NORMAL %
HLA-DP+DQ+DR AB NFR SER: NORMAL %
METHADONE SERPL QL SCN: NEGATIVE NG/ML
METHAMPHET SERPL QL: NEGATIVE NG/ML
OPIATES SERPL QL SCN: NEGATIVE NG/ML
OXYCODONE SERPL QL: NEGATIVE NG/ML
PCP SERPL QL SCN: NEGATIVE NG/ML

## 2024-07-12 ENCOUNTER — TELEPHONE (OUTPATIENT)
Dept: TRANSPLANT | Facility: HOSPITAL | Age: 69
End: 2024-07-12
Payer: COMMERCIAL

## 2024-07-12 DIAGNOSIS — Z79.4 TYPE 2 DIABETES MELLITUS WITHOUT COMPLICATION, WITH LONG-TERM CURRENT USE OF INSULIN (MULTI): ICD-10-CM

## 2024-07-12 DIAGNOSIS — E11.9 TYPE 2 DIABETES MELLITUS WITHOUT COMPLICATION, WITH LONG-TERM CURRENT USE OF INSULIN (MULTI): ICD-10-CM

## 2024-07-12 LAB
FLOW AUTOCROSSMATCH: NORMAL
HLA RESULTS: NORMAL
NIL(NEG) CONTROL SPOT COUNT: NORMAL
PANEL A SPOT COUNT: 4
PANEL B SPOT COUNT: 0
POS CONTROL SPOT COUNT: NORMAL
T-SPOT. TB INTERPRETATION: NEGATIVE

## 2024-07-12 PROCEDURE — RXMED WILLOW AMBULATORY MEDICATION CHARGE

## 2024-07-12 NOTE — TELEPHONE ENCOUNTER
Per Dr. Arroyo ok to refill for 1 month, future refills needs to come from nephrologist    Called and LVM for patient which pharmacy to send script

## 2024-07-13 ENCOUNTER — PHARMACY VISIT (OUTPATIENT)
Dept: PHARMACY | Facility: CLINIC | Age: 69
End: 2024-07-13
Payer: MEDICAID

## 2024-07-13 LAB
COTININE SERPL-MCNC: <5 NG/ML
NICOTINE SERPL-MCNC: <5 NG/ML
PSA FREE MFR SERPL: NORMAL %
PSA FREE SERPL-MCNC: <0.1 NG/ML
PSA SERPL IA-MCNC: <0.1 NG/ML (ref 0–4)

## 2024-07-15 ENCOUNTER — DOCUMENTATION (OUTPATIENT)
Dept: TRANSPLANT | Facility: HOSPITAL | Age: 69
End: 2024-07-15
Payer: COMMERCIAL

## 2024-07-15 PROCEDURE — RXMED WILLOW AMBULATORY MEDICATION CHARGE

## 2024-07-15 RX ORDER — INSULIN GLARGINE 100 [IU]/ML
INJECTION, SOLUTION SUBCUTANEOUS
Qty: 15 ML | Refills: 11 | Status: SHIPPED | OUTPATIENT
Start: 2024-07-15

## 2024-07-15 RX ORDER — PEN NEEDLE, DIABETIC 30 GX3/16"
NEEDLE, DISPOSABLE MISCELLANEOUS
Qty: 400 EACH | Refills: 3 | Status: SHIPPED | OUTPATIENT
Start: 2024-07-15

## 2024-07-18 DIAGNOSIS — E11.9 DIABETES MELLITUS TYPE 2 WITHOUT RETINOPATHY (MULTI): ICD-10-CM

## 2024-07-18 DIAGNOSIS — E55.9 VITAMIN D DEFICIENCY: ICD-10-CM

## 2024-07-18 PROCEDURE — RXMED WILLOW AMBULATORY MEDICATION CHARGE

## 2024-07-18 RX ORDER — CALCITRIOL 0.5 UG/1
0.5 CAPSULE ORAL DAILY
Qty: 90 CAPSULE | Refills: 3 | Status: SHIPPED | OUTPATIENT
Start: 2024-07-18 | End: 2025-07-18

## 2024-07-19 ENCOUNTER — PHARMACY VISIT (OUTPATIENT)
Dept: PHARMACY | Facility: CLINIC | Age: 69
End: 2024-07-19
Payer: MEDICAID

## 2024-07-19 ENCOUNTER — APPOINTMENT (OUTPATIENT)
Dept: TRANSPLANT | Facility: HOSPITAL | Age: 69
End: 2024-07-19
Payer: COMMERCIAL

## 2024-07-19 NOTE — PROGRESS NOTES
Spoke to patient today via the phone re his [place] Medicaid benefits.  He is aware he will need dental clearance for approval from [place].  He is aware he has benefits for a LD.  Discussed importance of maintaining his medicaid benefits.  Patient is not a .

## 2024-07-20 NOTE — PROGRESS NOTES
TRANSPLANT NEPHROLOGY CONSULT :   KIDNEY TRANSPLANT RECIPIENT EVALUATION        SERVICE DATE: 07/10/2024     REASON FOR CONSULT/CHIEF COMPLAINT:    FOR KIDNEY TRANSPLANT RECIPIENT EVALUATION.    HPI:    Mr. Bashir is a 69 y.o. male with past medical history significant for 68 y.o.with a history of ESRD sec to HTN on HD since 1988, s/p kidney txp 3/26/1992 that failed 11/13/2006), s/p kidney txp #2 7/13/2013 which recently failed May 2024 following which pt has been on HD (TTS, [facility] via LUE AVG) who is here for eval as potential candidate for re-transplant.     Other PMH: MGUS with IgG and IgA lambda (bone marrow bx 10/2023 with no plasma cell dyscrasia), DM2, HTN, prostate cancer s/p radical prostatectomy, baseline urinary incontinence, HCV s/p treatment (recent HCV PCR negative 7/2024).    Pt with h/o frequent admissions over the past 12 months with ISIAH, fluid overload. 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  Patient was treated with 2 doses of rituximab 1gm 1/2024. Allograft function continued to decline and pt was started on RRT 5/2024.     Last seen in txp clinic few weeks ago.     Today, pt reports he has been doing much better since starting dialysis. LE swelling is improved. Pt sill makes urine at regular intervals.     Last ER visit for chest pain, HTN urgency 6/26/24. Last TTE 4/2024 with preserved LVEF, severe LVH, mild-mod AR, mildly elevated RVSP.     No potential donors currently. Has good family support.     Fully functional. Able to perform all ADLs, IADLs independently. Able to climb upto 2 flights of stairs    The patient is here for kidney transplant recipient evaluation. Mr. Bashir has had multiple complications from end stage severe renal disease including anemia, secondary hyperparathyroidism, and osteodystrophy. The patient is here today for  an evaluation for kidney transplantation to improve quality of life and decrease the risk of cardiovascular disease, coronary artery disease and stroke.     The patient is doing well without complaints. Denied chest pain, shortness of breath, palpitation, dyspnea on exertion, dysuria, fever, nausea, vomiting, diarrhea and flu-liked symptoms. No swelling of the extremities.     ROS:  Review of  14 systems was performed system by system. See HPI. Otherwise, the symptoms were negative.    PAST MEDICAL HISTORY: Please see HPI.    Past Medical History:   Diagnosis Date    Anemia     Arthritis     Cataract     Chronic kidney disease, stage 3 unspecified (Multi) 09/26/2018    Stage 3 chronic kidney disease    CKD (chronic kidney disease)     stage V    COVID-19 06/18/2020    COVID-19 virus infection    Diabetes (Multi)     ESRD (end stage renal disease) (Multi)     Focal and segmental proliferative glomerulonephritis 12/23/2023    HTN (hypertension)     Hyperlipidemia     Other long term (current) drug therapy 07/20/2021    High risk medication use    Personal history of other diseases of the circulatory system     Personal history of cardiac murmur    Personal history of other infectious and parasitic diseases 08/17/2015    History of hepatitis    Polyp, colonic 08/17/2023    Primary osteoarthritis of both ankles 08/17/2023    Prostate cancer (Multi)     Tubular adenoma of colon 08/17/2023    Unspecified kidney failure 08/17/2016    Renal failure        PAST SURGICAL HISTORY: Please see HPI.    Past Surgical History:   Procedure Laterality Date    ILEOSTOMY  04/25/2017    Ileostomy    ILEOSTOMY CLOSURE  08/17/2015    Ileostomy Closure    OTHER SURGICAL HISTORY  04/21/2017    Right Hemicolectomy    OTHER SURGICAL HISTORY  08/17/2015    Arteriovenous Surgery Creation Of A-V Fistula    OTHER SURGICAL HISTORY  08/17/2015    Sigmoidoscopy (Fiberoptic, Therapeutic )    PROSTATECTOMY  10/11/2013    Prostatectomy Radical     TRANSPLANT, KIDNEY, OPEN  1992    TRANSPLANT, KIDNEY, OPEN  2013    US GUIDED PERCUTANEOUS BIOPSY RENAL LEFT Left 11/20/2023    US GUIDED PERCUTANEOUS BIOPSY RENAL LEFT 11/20/2023 Lou Rodgers MD [facility]    US GUIDED PERCUTANEOUS PERITONEAL OR RETROPERITONEAL FLUID COLLECTION DRAINAGE  10/20/2022    US GUIDED PERCUTANEOUS PERITONEAL OR RETROPERITONEAL FLUID COLLECTION DRAINAGE 10/20/2022 [facility]ICAL LEGACY        SOCIAL HISTORY: Please see our 's note for details.    Social History     Socioeconomic History    Marital status: Single     Spouse name: Not on file    Number of children: Not on file    Years of education: Not on file    Highest education level: Not on file   Occupational History    Not on file   Tobacco Use    Smoking status: Never     Passive exposure: Past    Smokeless tobacco: Never   Vaping Use    Vaping status: Never Used   Substance and Sexual Activity    Alcohol use: Yes    Drug use: Yes     Types: Oxycodone    Sexual activity: Defer   Other Topics Concern    Not on file   Social History Narrative    Not on file     Social Determinants of Health     Financial Resource Strain: Low Risk  (5/16/2024)    Overall Financial Resource Strain (CARDIA)     Difficulty of Paying Living Expenses: Not hard at all   Food Insecurity: No Food Insecurity (7/3/2024)    Hunger Vital Sign     Worried About Running Out of Food in the Last Year: Never true     Ran Out of Food in the Last Year: Never true   Transportation Needs: No Transportation Needs (5/16/2024)    PRAPARE - Transportation     Lack of Transportation (Medical): No     Lack of Transportation (Non-Medical): No   Physical Activity: Unknown (4/4/2024)    Exercise Vital Sign     Days of Exercise per Week: Patient unable to answer     Minutes of Exercise per Session: 30 min   Stress: No Stress Concern Present (10/3/2023)    [employer] - Occupational Stress Questionnaire     Feeling of Stress : Not at all   Social  Connections: Unknown (10/3/2023)    Social Connection and Isolation Panel [NHANES]     Frequency of Communication with Friends and Family: More than three times a week     Frequency of Social Gatherings with Friends and Family: Twice a week     Attends [employer] Services: Patient declined     Active Member of Clubs or Organizations: Patient declined     Attends Club or Organization Meetings: Patient declined     Marital Status: Never    Intimate Partner Violence: Not At Risk (10/3/2023)    Humiliation, Afraid, Rape, and Kick questionnaire     Fear of Current or Ex-Partner: No     Emotionally Abused: No     Physically Abused: No     Sexually Abused: No   Housing Stability: Low Risk  (5/16/2024)    Housing Stability Vital Sign     Unable to Pay for Housing in the Last Year: No     Number of Places Lived in the Last Year: 1     Unstable Housing in the Last Year: No       FAMILY HISTORY:  Family History   Problem Relation Name Age of Onset    Bone cancer Mother      Other (corona's sarcome of the bone marrow) Mother      Prostate cancer Father      Diabetes Other Family Hist     Hypertension Other Family Hist        MEDICATION LIST:  Current Outpatient Medications   Medication Instructions    acetaminophen (TYLENOL) 975 mg, oral, Every 6 hours PRN    aspirin 81 mg, oral, Daily    B complex-vitamin C-folic acid (Nephrocaps) 1 mg capsule 1 capsule, oral, Daily    bumetanide (BUMEX) 2 mg, oral, 4 times weekly, Take bumetanide 2mg once daily on non-dialysis days (Sunday, Monday, Wednesday, Friday)    calcitriol (ROCALTROL) 0.5 mcg, oral, Daily    carvedilol (COREG) 37.5 mg, oral, 2 times daily    cinacalcet (SENSIPAR) 30 mg, oral, Daily    Easy Touch Alcohol Prep Pads pads, medicated     epoetin aida 10,000 unit/mL injection Inject 1 mL (10,000 Units) under the skin every 7 days. Do not start before April 17, 2024.    Gvoke HypoPen 1-Pack 1 mg, intramuscular, Every 15 min PRN    hydrALAZINE (APRESOLINE) 100 mg, oral,  "Every 8 hours    insulin glargine (Lantus) 100 unit/mL (3 mL) pen Inject 20 units daily as directed    insulin lispro (HumaLOG) 100 unit/mL injection subcutaneous, 3 times daily (morning, midday, late afternoon), 100-199 2 units 200-299 4 units 300-399 6 units     isosorbide mononitrate ER (IMDUR) 60 mg, oral, Daily    lancets (Unilet Super Thin Lancets) 30 gauge misc 3 times daily    loperamide (IMODIUM) 2 mg, oral, 4 times daily PRN    metoclopramide (REGLAN) 5 mg, oral, 2 times daily, 1 tablet before dinner and 1 tablet at bedtime.    NIFEdipine ER (ADALAT CC) 60 mg, oral, 2 times daily, Do not crush, chew, or split.    pantoprazole (ProtoNix) 40 mg EC tablet Take 1 tablet (40 mg) by mouth once daily in the morning. Take before meals. Do not crush, chew, or split. Do not start before January 22, 2024.    pen needle, diabetic (TechLITE Pen Needle) 32 gauge x 5/32\" needle Use 4 a day as directed    pravastatin (PRAVACHOL) 10 mg, oral, Nightly    predniSONE (Deltasone) 5 mg tablet Take 1 tablet (5 mg) by mouth once daily in the morning. Do not start before January 22, 2024.    syringe with needle 3 mL 25 x 5/8\" syringe Use to inject epogen.    tacrolimus (PROGRAF) 1 mg, oral, 2 times daily    tacrolimus (Protopic) 0.1 % ointment Topical, 2 times daily    tacrolimus (Protopic) 0.1 % ointment Topical, 2 times daily    Unilet Super Thin Lancets 30 gauge misc 1 each, miscellaneous, 3 times daily       ALLERGY  No Known Allergies    PHYSICAL EXAM:    Visit Vitals  /60   Pulse 68   Temp 35.9 °C (96.7 °F)   Resp 16   Ht 1.727 m (5' 8\")   Wt 71.7 kg (158 lb)   SpO2 99%   BMI 24.02 kg/m²   Smoking Status Never   BSA 1.85 m²        General Appearance - NAD, Good speech, oriented and alert  HEENT - Supple. Not pale. No jaundice. No cervical lymphadenopathy. Pharynx and tonsils are not injected.  CVS - RRR. Normal S1/S2. No murmur, click , rub or gallop  Lungs- clear to auscultation bilaterally  Abdomen - soft , not " tender, no guarding, no rigidity. No hepatosplenomegaly. Normal bowel sounds. No masses and ascites. No allograft tenderness.  Musculoskeletal /Extremities - no edema. Full ROM. No joint tenderness.   Neuro/Psych - appropriate mood and affect. Motor power V/V all extremities. CN I -XII were grossly intact.  Skin - No visible rash  Dialysis access :  LUE AVG is clean, dry and intact. No signs of infection.    LABS:    Lab Results   Component Value Date    WBC 3.6 (L) 07/10/2024    HGB 8.2 (L) 07/10/2024    HCT 25.6 (L) 07/10/2024     (L) 07/10/2024    CHOL 96 07/10/2024    TRIG 46 10/26/2023    HDL 44.3 07/10/2024    ALT 12 07/10/2024    AST 22 07/10/2024     06/26/2024    K 3.9 06/26/2024     06/26/2024    CREATININE 4.80 (H) 07/10/2024    BUN 30 (H) 07/10/2024    CO2 29 06/26/2024    TSH 1.94 07/19/2023    PSA <0.1 07/10/2024    INR 1.0 01/05/2024    HGBA1C 6.6 (H) 07/10/2024     par    EKG:  Encounter Date: 06/26/24   ECG 12 lead   Result Value    Ventricular Rate 68    Atrial Rate 68    WI Interval 174    QRS Duration 146    QT Interval 442    QTC Calculation(Bazett) 469    P Axis 78    R Axis 28    T Axis 38    QRS Count 11    Q Onset 215    P Onset 128    P Offset 187    T Offset 436    QTC Fredericia 461    Narrative    Normal sinus rhythm  Left bundle branch block  Abnormal ECG  When compared with ECG of 14-MAY-2024 13:03,  Sinus rhythm has replaced Wide QRS rhythm    See ED provider note for full interpretation and clinical correlation  Confirmed by Millicent Sheikh ([id number]) on 6/27/2024 12:13:12 AM       Echocardiogram:   No results found for this or any previous visit from the past 1825 days.      Cardiac Catheterization:   No results found for this or any previous visit from the past 1825 days.  No results found for this or any previous visit from the past 3650 days.       ASSESSMENT AND PLAN:    Pt may prove to be reasonable candidate for kidney re-transplant pending further eval.   No  major contraindication from medical standpoint.   No potential donors at this time. Has good family support.    Abd imaging per surgical team.     Needs cardiac testing per protocol- CT calcium score, echo/stress.     Needs psychosocial eval, age-appropriate cancer screening, rest of the eval per protocol.     *For AA patients :    After reviewing  the records on [facility] , we are      Unable to find the qualified GFR for waiting time modification.  Pt has been dialysis dependent since 1988 and has had 2 prior kidney transplants.     The case will be presented at the selection committee at the [facility] [place].  The final decision from the committee will be sent out to notify the patient/primary care physician/ nephrologist. The above recommendations were discussed with the patient at length.     In addition, the following were also discussed:    - Risks and benefits of transplantation, both short-term and long-term    - Risk of primary graft non-function, DGF, SGF, rejection, primary disease recurrence, return to dialysis    - Risks of immunosuppression including infections, CA, CV risk    - Need for compliance with medications and medical care in general    - We reviewed the necessity of HBV vaccination and other recommended vaccines before a kidney transplant, following the Centers for Disease Control and Prevention(CDC)'s guidelines [https://www.cdc.gov/vaccines/schedules/downloads/adult/adult-combined-schedule.pdf]     Currently, the patient has received the following vaccines:    Immunization History   Administered Date(s) Administered    Hep A / Hep B 08/17/2015    Hep A, Unspecified 08/17/2015    Hepatitis A vaccine, age 19 years and greater (HAVRIX) 08/17/2015    Influenza, Unspecified 09/22/2009    Influenza, seasonal, injectable 09/22/2009    Pneumococcal Conjugate PCV 7 1956         The patient expressed understanding of the above and  wishes to proceed.  I answered all of his questions. I urged the patient to look for living donors.    - I have spent over 60 minutes with the patient, reviewing medical record, lab result , CXR result and other specialty's notes. More than 50% of the time was spent in counseling, explaining about the transplantation and answering the questions. I also reviewed the medical record, blood test results, imaging and previous studies which were obtained from the nephrologists.

## 2024-07-22 PROCEDURE — RXMED WILLOW AMBULATORY MEDICATION CHARGE

## 2024-07-24 ENCOUNTER — TELEPHONE (OUTPATIENT)
Dept: TRANSPLANT | Facility: HOSPITAL | Age: 69
End: 2024-07-24
Payer: COMMERCIAL

## 2024-07-24 ENCOUNTER — PHARMACY VISIT (OUTPATIENT)
Dept: PHARMACY | Facility: CLINIC | Age: 69
End: 2024-07-24
Payer: MEDICAID

## 2024-07-24 DIAGNOSIS — E11.9 DIABETES MELLITUS TYPE 2 WITHOUT RETINOPATHY (MULTI): ICD-10-CM

## 2024-07-24 RX ORDER — LOPERAMIDE HYDROCHLORIDE 2 MG/1
2 CAPSULE ORAL 4 TIMES DAILY PRN
Qty: 30 CAPSULE | Refills: 0 | OUTPATIENT
Start: 2024-07-24

## 2024-07-28 PROCEDURE — RXMED WILLOW AMBULATORY MEDICATION CHARGE

## 2024-07-31 LAB
HLA CLS I TYP PNL BLD/T DONR HIGH RES: NORMAL
HLA RESULTS: NORMAL
HLA-DP2 QL: NORMAL
HLA-DQB1 HIGH RES: NORMAL
HLA-DRB1 HIGH RES: NORMAL

## 2024-07-31 PROCEDURE — RXMED WILLOW AMBULATORY MEDICATION CHARGE

## 2024-08-01 ENCOUNTER — PHARMACY VISIT (OUTPATIENT)
Dept: PHARMACY | Facility: CLINIC | Age: 69
End: 2024-08-01
Payer: MEDICAID

## 2024-08-01 PROCEDURE — RXMED WILLOW AMBULATORY MEDICATION CHARGE

## 2024-08-08 PROCEDURE — RXMED WILLOW AMBULATORY MEDICATION CHARGE

## 2024-08-09 ENCOUNTER — OFFICE VISIT (OUTPATIENT)
Dept: TRANSPLANT | Facility: HOSPITAL | Age: 69
End: 2024-08-09
Payer: COMMERCIAL

## 2024-08-09 ENCOUNTER — SOCIAL WORK (OUTPATIENT)
Dept: TRANSPLANT | Facility: HOSPITAL | Age: 69
End: 2024-08-09
Payer: COMMERCIAL

## 2024-08-09 VITALS
DIASTOLIC BLOOD PRESSURE: 65 MMHG | HEART RATE: 68 BPM | WEIGHT: 157.4 LBS | OXYGEN SATURATION: 100 % | BODY MASS INDEX: 23.93 KG/M2 | TEMPERATURE: 97.9 F | SYSTOLIC BLOOD PRESSURE: 114 MMHG

## 2024-08-09 DIAGNOSIS — N18.6 ESRD (END STAGE RENAL DISEASE) (MULTI): Primary | ICD-10-CM

## 2024-08-09 PROCEDURE — 99215 OFFICE O/P EST HI 40 MIN: CPT | Performed by: STUDENT IN AN ORGANIZED HEALTH CARE EDUCATION/TRAINING PROGRAM

## 2024-08-09 ASSESSMENT — PAIN SCALES - GENERAL: PAINLEVEL: 0-NO PAIN

## 2024-08-09 NOTE — PROGRESS NOTES
"ENCOUNTER    Visit Type Initial Visit  Location: [place] [address]    What is your primary language? English  Other languages/fluency?     Barriers to Communication / Understanding:   [] Language [] Vision [] Hearing [] Other      []  Present     Accompanied By: Children's motherAmalia    Organ For Transplant:  Kidney    Ethnicity:  Black Or     ADLs Fully Independent      Instrumental ADLs Fully Independent      Level of Activity Active      DME: Denied    Knowledge of Health Good  Prostate cancer (2012)    Why Do You Have End Stage Organ Disease   Pt reported the cause of his kidney disease is HTN.     When did you become aware of your diagnosis?   Pt reported he became aware in 1988.     Knowledge of Transplant / VAD:  Yes Patient Is Able To Make An Informed Decision    Patient Understands the Risks of Transplant / VAD   Yes Rejection  Yes Infection Yes Complications  Yes Low Risk of Death    Patient Understands Recovery and Follow-Up from Transplant / VAD  Yes Length Of State Yes Appointments  Yes Labs  Yes Rehabilitation    Patient Has Identified Goals of Transplant / VAD Yes  Pt reported a goal of transplant is \"to come off the dialysis machine and travel.\"    What is your biggest concern?      If previously denied listing, why were you denied? Please describe that experience and how it is different now.   N/A    Overall Compliance  Good       Amount of Daily Medications: 25   Compliance With Medications Good    Managed By Patient with help from Amalia  Organized By: [employer]     Have you ever changed the way you take a medication without talking to the doctor?   No     Understanding Of Medication  Good  Compliance With Appointments Good    What has your relationship been like with previous medical providers?      Fluid Restriction:   No [] Compliant       Dialysis:  [x] What Dialysis Center   CDC [x] Began   3 months ago      [] In Center [] Home Hemo [] Peritoneal "       Attendance:  Treatment Attendance Good Treatment Time T, Th, Sat - Runs for 3.5 hrs    [] Shortened Treatments [] Rescheduled Treatments [] Missed Treatments          Diet:   Patient is compliant with renal restrictions     Patient is compliant with low sodium diet       # of Binders:  [] # of Binders per meal [] Meals per day      []  # of Binders per Snack [] Snacks per day       Pancreas:  [] Checks blood sugar      times/day     Hypoglycemic Episodes  Outside Interventions      Liver:  Is Lactulose prescribed  Dose:   Timesper day:  Is patient compliant       SOCIAL HISTORY  No       Years of Service      Were you involved in active combat?                 Do you receive benefits through the [facility]?        Education:  education:  Diploma    Literate Yes   Computer literate Yes  Internet access Yes       Sources of Income: SSI ($900 monthly)  Patient's Current Employment    [] Full-Time [] Part-Time [] Unemployed [x] Retired     []  None [] Not working by choice [] Not working disabled     [] Short Term Leave   [] Other   Employment History     Will patient have paid status from employment during recovery     Spouse / SO Current Employment     Will spouse / SO have paid status from employment during recovery     Other Sources of Income Total Household Income $10,800 yearly      Does patient have financial concerns   No     Is patient able to meet current monthly expenses   Yes    Resources:   provided resource sheet.     Patient was provided information on transplant fundraising       Insurance:  Primary Insurance [place]    Secondary Insurance     Prescription Coverage Copay cost per month $0    Comments:     FAMILY SYSTEM    Single Yes    How long   Describe Relationship    How long   Describe Relationship    When     When  In a Relationship   How Long  Describe Relationship    Spouse / SO Name   Age   Health   Other Caregiver Responsibilities  Spouse / Significant  others reaction to donation    Children:  Yes # Biological (2 daughters, 4 sons)   # Adopted    # Step Children    : 1 son    Child #1 Name  Age   Health    Lives   How Much Contact     Child #2 Name  Age   Health    Lives   How Much Contact     Child #3 Name Age  Health    Lives   How Much Contact     Parents:  Raised By Both Biological Parents    Did the patient have contact with the other parent     Mother  Yes Age 50s  Cause of Death Bone cancer  Father  Yes Age 60s  Cause of Death Heart attack    Living Parent #1  Living Parent #2    Additional Information    Siblings:  [x] # Biological (3 sisters total; 1 sister ) [] # Half Siblings [] # Step Siblings     Sibling #1  Sibling #2    Support & Recovery Plan:  Yes Adequate    Primary Support:  Name Amalia Phone [phone]  Age 65   Relationship to Patient Children's mother  If employed, can they take time off work Retired   If so, is it paid time off    If not, will this impact your finances    Did they attend education classes Yes   Do they have other caregiver responsibilities (child or eldercare) No   Do they have their own conditions which may prevent them from providing care for you No  (Medical, psychological, physical limitations)    Are they available on short notice Yes   Are they reliable Yes   Are they responsible Yes   Are they able to understand and process new information Yes   Do they have reliable transportation or will you allow them to use your vehicle Yes   Are they currently involved in your care Yes   Comments    Secondary Support:  Name Christin Phone [phone] Age 34  Relationship to Patient Daughter  If employed, can they take time off work Yes   If so, is it paid time off    If not, will this impact your finances    Did they attend education classes No   Do they have other caregiver responsibilities (child or eldercare) Yes   Pt reported there are other family members to care for the children.   Do they have  "their own conditions which may prevent them from providing care for you No  (Medical, psychological, physical limitations)    Are they available on short notice Yes   Are they reliable Yes   Are they responsible Yes   Are they able to understand and process new information Yes   Do they have reliable transportation or will you allow them to use your vehicle Yes   Are they currently involved in your care Yes   Comments    Alternate Support  Alternate Support    How comfortable are you asking for and/or receiving help?      Housing:  Yes Adequate Owns home  Type of Home House  Indicate steps into home/bed/bathroom: 4-5 steps into home, 10 steps to get into bedroom, 7 steps to get into bathroom  Who all lives with Pt: Pt and Rylee (daughter)  Distance to [facility] 10 minutes  Pets 0    Does Patient Feel Safe in Home Yes    Transportation:  Yes Adequate  # Licensed Drivers in the Home 2  Does Patient Drive Yes If not, why  # Reliable Vehicles 2  Does Patient use Public Transportation No  Does Patient use Medical Transportation No  Comments      MENTAL HEALTH    Cognition:  No impairment observed / reported    The patient reports their mood as \"been alright.\"    Reported Mental Health Diagnosis   Denied  Family History of Mental Health Concerns   Denied  What are patient psychosocial stressors   Pt denied any current stressors.     Current Medications:  No  Mental Health Meds  Denied  Rx'd by   Sleep Meds  Denied  Rx'd by   Pain Meds  Denied  Rx'd by     OTC Meds   Tylenol  Past Mental Health Medications   Denied      Counseling Never  Pt declined MH resources at this time.     Has patient ever been hospitalized for mental health reasons No   Was the hospitalization voluntary  Duration   Where    When  Describe situation    Discharge Plan for Follow Up  Was Discharge Plan Completed   Referral to Transplant Psych   No       Suicide Assessment:  History of Suicide Ideation No  [] Timeframe     Frequency   Plan Created  Intent " "to Follow Through  Outcome      History of Suicide Attempt No       History of Suicidal Ideation in the past 3 months No   Intensity   Duration     Description of Plan      Plans thought of:   Intent to Follow Through:     Current Plan for Safety    Plan for Follow-Up        In the past 6 months, has anyone physically, sexually, or emotionally abused you?   No     Ever in the past, has anyone physically, sexually, or emotionally abused you?     Patient's Reported Trauma History          What are patient's coping behaviors   Pt reported working on cars and going to the racetrack as coping behaviors.     Do you have any activities that you're unable to do now, that you hope to return to after transplant?     Confucianist / Spirituality   Denied    Do you have any Holiness, ethical, or personal objections to accepting blood products, surgery, and/or transplant?   No      Thought Processes:   Attitude toward interviewer Cooperative and Appropriate    Eye Contact Patient maintained good eye contact throughout appointment    Appearance The patient was neatly groomed, appropriately dressed and adequately nourished    Affect Appropriate    Thought Process Appropriate      Substance Use /Abuse History:    Current Tobacco User No  Patient uses   Tobacco Frequency   For How Long      Former Tobacco User No  Describe past tobacco use and date quit      Current Alcohol User No  Type of Alcohol Used   Amount  Frequency   Pattern of Alcohol Use      If alcohol use disorder is suspected, ask the following:   Continued to use the substance despite being told the substance is affecting their health    History of problems at work, school or home due to substance use    History of DUI's/legal issues related to substance use       Former Alcohol User Yes  Describe past alcohol use and date quit  Pt reported his last alcohol use was a \"sip\" on his birthday in [birth year]. Pt shared before that sip it has \"been a while\" since he had alcohol. Pt " "reported he would drink alcohol \"here and there.\"     Has patient ever gone to CD treatment No  If yes, When, Where and What type of Program  Attends AA meetings Never   Sponsor  Do support people drink alcohol   If yes, describe support people's use  Is alcohol kept in the home   Does Patient need to sign a CD contract       Current Illegal / Unprescribed Drug User No  Type of Illegal Drug Used   Frequency  Pattern of Drug Use      Illegal / Unprescribed Drug #2  Type of Illegal Drug Used   Frequency  Pattern of Drug Use      Continue to use the substance despite being told the substance is affecting their health    History of problems at work, school or home due to substance use      Former Illegal / Unprescribed Drug User No  Describe past illegal drug use and date quit    Has patient ever gone to CD treatment   If yes, When, Where and What type of Program   Attends AA/NA  meetings    Is patient on a Methadone / Suboxone regiment   Do support people use illegal drugs   If yes, describe support people's use  Are illegal drugs kept in the home   Does Patient need to sign a CD contract       Prescription Drug Abuse:  No Has patient experienced feelings of addiction  No Has patient experienced symptoms of withdrawal  No Has patient experienced any side effects? e.g.  hallucinations or delusions    Does Patient Meet the Criteria for Alcohol Use Disorder No Diagnosis  Does Patient Meet [facility] guidelines No  Does Patient Meet the Criteria for Illegal Drug Use Disorder No Diagnosis  Does Patient Meet [facility] guidelines Yes    [facility] Substance Relapse Risk Factors   DSM-5 Severity Factors:       LEGAL ISSUES  No Arrests  No Currently probation or parole No   skilled nursing No  When   How long   Where       Citizenship:   Where were you born?    If outside of [place], where?   What year did you move to the [place]?     Yes [place] Citizen   Green Card  Visa    Any outstanding citizenship concerns?      Advance Directives: No  HCPOA " "  Living Will   Who is the proxy?      SW provided documents.     Guardian:      JA TUTTLE met with Pt and Pt's children's mother, Amalia, for initial psychosocial assessment. Pt was pleasant and engaged. Pt reported he has [place] for insurance. Pt demonstrated a good understanding of the risks of transplant and recovery process. Pt denied any financial concerns at this time. Pt reported the cause of his kidney disease is HTN. Pt reported a goal of transplant is \"to come off the dialysis machine and travel.\" Pt reported good compliance with medications, appointments, and dialysis treatments. Pt listed his children's mother, Amalia, as primary support and his daughter, Christin, as secondary support. Pt reported both supports are adequate. Pt reported his mood as \"been alright.\" Pt denied any MH history. Pt scored a 1 on the PHQ-9, indicating minimal clinical depression. Pt scored a 0 on the MAKEDA-7, indicating minimal clinical anxiety. Pt denied any current tobacco, alcohol, or illicit drug use. Pt denied any past tobacco or illicit drug use. Pt reported his last alcohol use was a \"sip\" on his birthday in July. Pt shared before that sip it has \"been a while\" since he had alcohol. Pt reported he would drink alcohol \"here and there.\" Pt scored a 3 on the SIPAT, indicating Pt is an excellent candidate for transplant. SW would recommend listing Pt, and there are no psychosocial barriers at this time.     PLAN  MARRY to meet with Pt annually. SW to call and confirm secondary support.    "

## 2024-08-10 ENCOUNTER — PHARMACY VISIT (OUTPATIENT)
Dept: PHARMACY | Facility: CLINIC | Age: 69
End: 2024-08-10
Payer: MEDICAID

## 2024-08-10 PROCEDURE — RXMED WILLOW AMBULATORY MEDICATION CHARGE

## 2024-08-12 ENCOUNTER — DOCUMENTATION (OUTPATIENT)
Dept: TRANSPLANT | Facility: HOSPITAL | Age: 69
End: 2024-08-12
Payer: COMMERCIAL

## 2024-08-13 ASSESSMENT — PATIENT HEALTH QUESTIONNAIRE - PHQ9
6. FEELING BAD ABOUT YOURSELF - OR THAT YOU ARE A FAILURE OR HAVE LET YOURSELF OR YOUR FAMILY DOWN: NOT AT ALL
10. IF YOU CHECKED OFF ANY PROBLEMS, HOW DIFFICULT HAVE THESE PROBLEMS MADE IT FOR YOU TO DO YOUR WORK, TAKE CARE OF THINGS AT HOME, OR GET ALONG WITH OTHER PEOPLE: NOT DIFFICULT AT ALL
2. FEELING DOWN, DEPRESSED OR HOPELESS: NOT AT ALL
8. MOVING OR SPEAKING SO SLOWLY THAT OTHER PEOPLE COULD HAVE NOTICED. OR THE OPPOSITE, BEING SO FIGETY OR RESTLESS THAT YOU HAVE BEEN MOVING AROUND A LOT MORE THAN USUAL: NOT AT ALL
5. POOR APPETITE OR OVEREATING: NOT AT ALL
4. FEELING TIRED OR HAVING LITTLE ENERGY: SEVERAL DAYS
SUM OF ALL RESPONSES TO PHQ QUESTIONS 1-9: 1
9. THOUGHTS THAT YOU WOULD BE BETTER OFF DEAD, OR OF HURTING YOURSELF: NOT AT ALL
7. TROUBLE CONCENTRATING ON THINGS, SUCH AS READING THE NEWSPAPER OR WATCHING TELEVISION: NOT AT ALL
3. TROUBLE FALLING OR STAYING ASLEEP OR SLEEPING TOO MUCH: NOT AT ALL
SUM OF ALL RESPONSES TO PHQ9 QUESTIONS 1 & 2: 0
1. LITTLE INTEREST OR PLEASURE IN DOING THINGS: NOT AT ALL

## 2024-08-13 ASSESSMENT — ANXIETY QUESTIONNAIRES
2. NOT BEING ABLE TO STOP OR CONTROL WORRYING: NOT AT ALL
5. BEING SO RESTLESS THAT IT IS HARD TO SIT STILL: NOT AT ALL
4. TROUBLE RELAXING: NOT AT ALL
3. WORRYING TOO MUCH ABOUT DIFFERENT THINGS: NOT AT ALL
GAD7 TOTAL SCORE: 0
6. BECOMING EASILY ANNOYED OR IRRITABLE: NOT AT ALL
IF YOU CHECKED OFF ANY PROBLEMS ON THIS QUESTIONNAIRE, HOW DIFFICULT HAVE THESE PROBLEMS MADE IT FOR YOU TO DO YOUR WORK, TAKE CARE OF THINGS AT HOME, OR GET ALONG WITH OTHER PEOPLE: NOT DIFFICULT AT ALL
7. FEELING AFRAID AS IF SOMETHING AWFUL MIGHT HAPPEN: NOT AT ALL
1. FEELING NERVOUS, ANXIOUS, OR ON EDGE: NOT AT ALL

## 2024-08-14 DIAGNOSIS — I15.9 SECONDARY HYPERTENSION: ICD-10-CM

## 2024-08-14 PROCEDURE — RXMED WILLOW AMBULATORY MEDICATION CHARGE

## 2024-08-15 ENCOUNTER — DOCUMENTATION (OUTPATIENT)
Dept: TRANSPLANT | Facility: HOSPITAL | Age: 69
End: 2024-08-15
Payer: COMMERCIAL

## 2024-08-15 NOTE — PROGRESS NOTES
Patient attended appointment on 08/09/2024 with Dr. Lara.  Female support present. Medications and allergies reviewed with the patient. Patient ambulated. Patient is tolerating dialysis well. Recent hospitalizations:  No.  Blood transfusions:  No.  Falls:  No    Comments:    Demographic updates: PCP Millicent Hutton [facility] Angelia T/Th/Sat    Needs:  -Get [facility] records - tasked out  -See Dr. Kurtis Jc re:  artificial sphincter - pt to schedule this appt

## 2024-08-16 PROCEDURE — RXMED WILLOW AMBULATORY MEDICATION CHARGE

## 2024-08-16 RX ORDER — NIFEDIPINE 60 MG/1
60 TABLET, FILM COATED, EXTENDED RELEASE ORAL 2 TIMES DAILY
Qty: 60 TABLET | Refills: 1 | Status: SHIPPED | OUTPATIENT
Start: 2024-08-16 | End: 2024-10-15

## 2024-08-17 ENCOUNTER — PHARMACY VISIT (OUTPATIENT)
Dept: PHARMACY | Facility: CLINIC | Age: 69
End: 2024-08-17
Payer: MEDICAID

## 2024-08-23 ENCOUNTER — DOCUMENTATION (OUTPATIENT)
Dept: PRIMARY CARE | Facility: CLINIC | Age: 69
End: 2024-08-23
Payer: COMMERCIAL

## 2024-08-23 NOTE — PROGRESS NOTES
Call to patient. In [facility] there is a note from an ED visit on 8/21, but call to patient today in followup, he denied visit. His diarrhea is resolving, but he continues to have a cough and chills. Has not tested for COVID. When at dialysis he reports they listened to his lungs and said they heard something there but didn't do anything.   Given patient's co morbidities and hospitalization in May for pneumonia and current increase in COVID in a high risk patient. Instructed him to go an be seen in the ED.

## 2024-08-24 ENCOUNTER — APPOINTMENT (OUTPATIENT)
Dept: RADIOLOGY | Facility: HOSPITAL | Age: 69
End: 2024-08-24
Payer: COMMERCIAL

## 2024-08-24 ENCOUNTER — HOSPITAL ENCOUNTER (INPATIENT)
Facility: HOSPITAL | Age: 69
End: 2024-08-24
Attending: EMERGENCY MEDICINE | Admitting: INTERNAL MEDICINE
Payer: COMMERCIAL

## 2024-08-24 ENCOUNTER — CLINICAL SUPPORT (OUTPATIENT)
Dept: EMERGENCY MEDICINE | Facility: HOSPITAL | Age: 69
End: 2024-08-24
Payer: COMMERCIAL

## 2024-08-24 DIAGNOSIS — Z94.0 KIDNEY TRANSPLANTED (HHS-HCC): ICD-10-CM

## 2024-08-24 DIAGNOSIS — N17.1 ACUTE RENAL FAILURE WITH ACUTE RENAL CORTICAL NECROSIS SUPERIMPOSED ON STAGE 4 CHRONIC KIDNEY DISEASE (MULTI): ICD-10-CM

## 2024-08-24 DIAGNOSIS — A04.72 C. DIFFICILE DIARRHEA: ICD-10-CM

## 2024-08-24 DIAGNOSIS — R50.9 FEVER AND CHILLS: ICD-10-CM

## 2024-08-24 DIAGNOSIS — I15.1 HYPERTENSION SECONDARY TO OTHER RENAL DISORDERS: ICD-10-CM

## 2024-08-24 DIAGNOSIS — D84.9 IMMUNOCOMPROMISED (MULTI): ICD-10-CM

## 2024-08-24 DIAGNOSIS — J18.9 PNEUMONIA OF RIGHT LOWER LOBE DUE TO INFECTIOUS ORGANISM: ICD-10-CM

## 2024-08-24 DIAGNOSIS — N18.4 ACUTE RENAL FAILURE WITH ACUTE RENAL CORTICAL NECROSIS SUPERIMPOSED ON STAGE 4 CHRONIC KIDNEY DISEASE (MULTI): ICD-10-CM

## 2024-08-24 DIAGNOSIS — J18.9 PNEUMONIA SYMPTOMS: Primary | ICD-10-CM

## 2024-08-24 LAB
ALBUMIN SERPL BCP-MCNC: 3.4 G/DL (ref 3.4–5)
ALP SERPL-CCNC: 68 U/L (ref 33–136)
ALT SERPL W P-5'-P-CCNC: 8 U/L (ref 10–52)
ANION GAP BLDV CALCULATED.4IONS-SCNC: 7 MMOL/L (ref 10–25)
ANION GAP SERPL CALC-SCNC: 14 MMOL/L (ref 10–20)
AST SERPL W P-5'-P-CCNC: 14 U/L (ref 9–39)
ATRIAL RATE: 79 BPM
BASE EXCESS BLDV CALC-SCNC: 6.3 MMOL/L (ref -2–3)
BASOPHILS # BLD AUTO: 0.03 X10*3/UL (ref 0–0.1)
BASOPHILS NFR BLD AUTO: 0.6 %
BILIRUB SERPL-MCNC: 0.5 MG/DL (ref 0–1.2)
BODY TEMPERATURE: 37 DEGREES CELSIUS
BUN SERPL-MCNC: 12 MG/DL (ref 6–23)
CA-I BLDV-SCNC: 1.18 MMOL/L (ref 1.1–1.33)
CALCIUM SERPL-MCNC: 9.1 MG/DL (ref 8.6–10.6)
CHLORIDE BLDV-SCNC: 98 MMOL/L (ref 98–107)
CHLORIDE SERPL-SCNC: 96 MMOL/L (ref 98–107)
CO2 SERPL-SCNC: 28 MMOL/L (ref 21–32)
CREAT SERPL-MCNC: 3.09 MG/DL (ref 0.5–1.3)
EGFRCR SERPLBLD CKD-EPI 2021: 21 ML/MIN/1.73M*2
EOSINOPHIL # BLD AUTO: 0.29 X10*3/UL (ref 0–0.7)
EOSINOPHIL NFR BLD AUTO: 5.4 %
ERYTHROCYTE [DISTWIDTH] IN BLOOD BY AUTOMATED COUNT: 12.8 % (ref 11.5–14.5)
FERRITIN SERPL-MCNC: 985 NG/ML (ref 20–300)
FLUAV RNA RESP QL NAA+PROBE: NOT DETECTED
FLUBV RNA RESP QL NAA+PROBE: NOT DETECTED
GLUCOSE BLD MANUAL STRIP-MCNC: 134 MG/DL (ref 74–99)
GLUCOSE BLD MANUAL STRIP-MCNC: 190 MG/DL (ref 74–99)
GLUCOSE BLDV-MCNC: 164 MG/DL (ref 74–99)
GLUCOSE SERPL-MCNC: 162 MG/DL (ref 74–99)
HCO3 BLDV-SCNC: 30.6 MMOL/L (ref 22–26)
HCT VFR BLD AUTO: 23.7 % (ref 41–52)
HCT VFR BLD EST: 26 % (ref 41–52)
HGB BLD-MCNC: 8.4 G/DL (ref 13.5–17.5)
HGB BLDV-MCNC: 8.7 G/DL (ref 13.5–17.5)
HIV 1+2 AB+HIV1 P24 AG SERPL QL IA: NONREACTIVE
IMM GRANULOCYTES # BLD AUTO: 0.02 X10*3/UL (ref 0–0.7)
IMM GRANULOCYTES NFR BLD AUTO: 0.4 % (ref 0–0.9)
INHALED O2 CONCENTRATION: 21 %
IRON SATN MFR SERPL: 9 % (ref 25–45)
IRON SERPL-MCNC: 13 UG/DL (ref 35–150)
LACTATE BLDV-SCNC: 0.7 MMOL/L (ref 0.4–2)
LYMPHOCYTES # BLD AUTO: 0.56 X10*3/UL (ref 1.2–4.8)
LYMPHOCYTES NFR BLD AUTO: 10.4 %
MCH RBC QN AUTO: 27.6 PG (ref 26–34)
MCHC RBC AUTO-ENTMCNC: 35.4 G/DL (ref 32–36)
MCV RBC AUTO: 78 FL (ref 80–100)
MONOCYTES # BLD AUTO: 0.6 X10*3/UL (ref 0.1–1)
MONOCYTES NFR BLD AUTO: 11.2 %
NEUTROPHILS # BLD AUTO: 3.88 X10*3/UL (ref 1.2–7.7)
NEUTROPHILS NFR BLD AUTO: 72 %
NRBC BLD-RTO: 0 /100 WBCS (ref 0–0)
OXYHGB MFR BLDV: 84.4 % (ref 45–75)
P AXIS: 64 DEGREES
P OFFSET: 193 MS
P ONSET: 133 MS
PCO2 BLDV: 42 MM HG (ref 41–51)
PH BLDV: 7.47 PH (ref 7.33–7.43)
PLATELET # BLD AUTO: 123 X10*3/UL (ref 150–450)
PO2 BLDV: 54 MM HG (ref 35–45)
POTASSIUM BLDV-SCNC: 3.7 MMOL/L (ref 3.5–5.3)
POTASSIUM SERPL-SCNC: 3.7 MMOL/L (ref 3.5–5.3)
PR INTERVAL: 166 MS
PROT SERPL-MCNC: 6.9 G/DL (ref 6.4–8.2)
Q ONSET: 216 MS
QRS COUNT: 12 BEATS
QRS DURATION: 144 MS
QT INTERVAL: 408 MS
QTC CALCULATION(BAZETT): 467 MS
QTC FREDERICIA: 447 MS
R AXIS: 84 DEGREES
RBC # BLD AUTO: 3.04 X10*6/UL (ref 4.5–5.9)
SAO2 % BLDV: 87 % (ref 45–75)
SARS-COV-2 RNA RESP QL NAA+PROBE: NOT DETECTED
SODIUM BLDV-SCNC: 132 MMOL/L (ref 136–145)
SODIUM SERPL-SCNC: 134 MMOL/L (ref 136–145)
T AXIS: 53 DEGREES
T OFFSET: 420 MS
TIBC SERPL-MCNC: 143 UG/DL (ref 240–445)
UIBC SERPL-MCNC: 130 UG/DL (ref 110–370)
VENTRICULAR RATE: 79 BPM
WBC # BLD AUTO: 5.4 X10*3/UL (ref 4.4–11.3)

## 2024-08-24 PROCEDURE — 84132 ASSAY OF SERUM POTASSIUM: CPT | Performed by: EMERGENCY MEDICINE

## 2024-08-24 PROCEDURE — 87040 BLOOD CULTURE FOR BACTERIA: CPT

## 2024-08-24 PROCEDURE — 1100000001 HC PRIVATE ROOM DAILY

## 2024-08-24 PROCEDURE — 74176 CT ABD & PELVIS W/O CONTRAST: CPT | Performed by: RADIOLOGY

## 2024-08-24 PROCEDURE — 84075 ASSAY ALKALINE PHOSPHATASE: CPT | Performed by: EMERGENCY MEDICINE

## 2024-08-24 PROCEDURE — 93005 ELECTROCARDIOGRAM TRACING: CPT

## 2024-08-24 PROCEDURE — 87389 HIV-1 AG W/HIV-1&-2 AB AG IA: CPT | Performed by: EMERGENCY MEDICINE

## 2024-08-24 PROCEDURE — 2500000001 HC RX 250 WO HCPCS SELF ADMINISTERED DRUGS (ALT 637 FOR MEDICARE OP)

## 2024-08-24 PROCEDURE — 83930 ASSAY OF BLOOD OSMOLALITY: CPT

## 2024-08-24 PROCEDURE — 87493 C DIFF AMPLIFIED PROBE: CPT

## 2024-08-24 PROCEDURE — 2500000004 HC RX 250 GENERAL PHARMACY W/ HCPCS (ALT 636 FOR OP/ED): Mod: JZ,SE

## 2024-08-24 PROCEDURE — 82728 ASSAY OF FERRITIN: CPT

## 2024-08-24 PROCEDURE — 71045 X-RAY EXAM CHEST 1 VIEW: CPT

## 2024-08-24 PROCEDURE — 83540 ASSAY OF IRON: CPT

## 2024-08-24 PROCEDURE — 99285 EMERGENCY DEPT VISIT HI MDM: CPT

## 2024-08-24 PROCEDURE — 2500000005 HC RX 250 GENERAL PHARMACY W/O HCPCS

## 2024-08-24 PROCEDURE — 2500000002 HC RX 250 W HCPCS SELF ADMINISTERED DRUGS (ALT 637 FOR MEDICARE OP, ALT 636 FOR OP/ED)

## 2024-08-24 PROCEDURE — 71045 X-RAY EXAM CHEST 1 VIEW: CPT | Performed by: RADIOLOGY

## 2024-08-24 PROCEDURE — 74176 CT ABD & PELVIS W/O CONTRAST: CPT

## 2024-08-24 PROCEDURE — 85025 COMPLETE CBC W/AUTO DIFF WBC: CPT | Performed by: EMERGENCY MEDICINE

## 2024-08-24 PROCEDURE — 93010 ELECTROCARDIOGRAM REPORT: CPT | Performed by: EMERGENCY MEDICINE

## 2024-08-24 PROCEDURE — 82330 ASSAY OF CALCIUM: CPT | Performed by: EMERGENCY MEDICINE

## 2024-08-24 PROCEDURE — 87636 SARSCOV2 & INF A&B AMP PRB: CPT | Performed by: EMERGENCY MEDICINE

## 2024-08-24 PROCEDURE — 96365 THER/PROPH/DIAG IV INF INIT: CPT

## 2024-08-24 PROCEDURE — 99285 EMERGENCY DEPT VISIT HI MDM: CPT | Performed by: EMERGENCY MEDICINE

## 2024-08-24 PROCEDURE — 87075 CULTR BACTERIA EXCEPT BLOOD: CPT | Performed by: EMERGENCY MEDICINE

## 2024-08-24 PROCEDURE — 36415 COLL VENOUS BLD VENIPUNCTURE: CPT | Performed by: EMERGENCY MEDICINE

## 2024-08-24 PROCEDURE — 82947 ASSAY GLUCOSE BLOOD QUANT: CPT

## 2024-08-24 PROCEDURE — 87506 IADNA-DNA/RNA PROBE TQ 6-11: CPT

## 2024-08-24 PROCEDURE — 87324 CLOSTRIDIUM AG IA: CPT

## 2024-08-24 RX ORDER — CEFEPIME 1 G/50ML
1 INJECTION, SOLUTION INTRAVENOUS EVERY 24 HOURS
Status: DISCONTINUED | OUTPATIENT
Start: 2024-08-25 | End: 2024-08-25

## 2024-08-24 RX ORDER — INSULIN GLARGINE 100 [IU]/ML
4 INJECTION, SOLUTION SUBCUTANEOUS NIGHTLY
Status: DISCONTINUED | OUTPATIENT
Start: 2024-08-24 | End: 2024-08-27

## 2024-08-24 RX ORDER — DEXTROSE 50 % IN WATER (D50W) INTRAVENOUS SYRINGE
25
Status: DISCONTINUED | OUTPATIENT
Start: 2024-08-24 | End: 2024-08-31 | Stop reason: HOSPADM

## 2024-08-24 RX ORDER — PREDNISONE 5 MG/1
5 TABLET ORAL EVERY MORNING
Status: DISCONTINUED | OUTPATIENT
Start: 2024-08-25 | End: 2024-08-28

## 2024-08-24 RX ORDER — NIFEDIPINE 60 MG/1
60 TABLET, FILM COATED, EXTENDED RELEASE ORAL 2 TIMES DAILY
Status: DISCONTINUED | OUTPATIENT
Start: 2024-08-24 | End: 2024-08-29

## 2024-08-24 RX ORDER — AZITHROMYCIN 500 MG/1
500 TABLET, FILM COATED ORAL
Status: COMPLETED | OUTPATIENT
Start: 2024-08-25 | End: 2024-08-26

## 2024-08-24 RX ORDER — CINACALCET 30 MG/1
30 TABLET, FILM COATED ORAL DAILY
Status: DISCONTINUED | OUTPATIENT
Start: 2024-08-24 | End: 2024-08-31 | Stop reason: HOSPADM

## 2024-08-24 RX ORDER — DEXTROSE 50 % IN WATER (D50W) INTRAVENOUS SYRINGE
12.5
Status: DISCONTINUED | OUTPATIENT
Start: 2024-08-24 | End: 2024-08-31 | Stop reason: HOSPADM

## 2024-08-24 RX ORDER — PRAVASTATIN SODIUM 20 MG/1
10 TABLET ORAL NIGHTLY
Status: DISCONTINUED | OUTPATIENT
Start: 2024-08-24 | End: 2024-08-31 | Stop reason: HOSPADM

## 2024-08-24 RX ORDER — BUMETANIDE 2 MG/1
2 TABLET ORAL
Status: DISCONTINUED | OUTPATIENT
Start: 2024-08-24 | End: 2024-08-31 | Stop reason: HOSPADM

## 2024-08-24 RX ORDER — HEPARIN SODIUM 5000 [USP'U]/ML
5000 INJECTION, SOLUTION INTRAVENOUS; SUBCUTANEOUS EVERY 8 HOURS
Status: DISCONTINUED | OUTPATIENT
Start: 2024-08-24 | End: 2024-08-31 | Stop reason: HOSPADM

## 2024-08-24 RX ORDER — HYDROMORPHONE HYDROCHLORIDE 1 MG/ML
0.2 INJECTION, SOLUTION INTRAMUSCULAR; INTRAVENOUS; SUBCUTANEOUS ONCE
Status: COMPLETED | OUTPATIENT
Start: 2024-08-25 | End: 2024-08-25

## 2024-08-24 RX ORDER — OXYCODONE HYDROCHLORIDE 5 MG/1
2.5 TABLET ORAL EVERY 8 HOURS PRN
Status: DISCONTINUED | OUTPATIENT
Start: 2024-08-24 | End: 2024-08-25

## 2024-08-24 RX ORDER — ACETAMINOPHEN 325 MG/1
650 TABLET ORAL EVERY 6 HOURS PRN
Status: DISCONTINUED | OUTPATIENT
Start: 2024-08-24 | End: 2024-08-28

## 2024-08-24 RX ORDER — NAPROXEN SODIUM 220 MG/1
81 TABLET, FILM COATED ORAL DAILY
Status: DISCONTINUED | OUTPATIENT
Start: 2024-08-24 | End: 2024-08-31 | Stop reason: HOSPADM

## 2024-08-24 RX ORDER — LIDOCAINE 560 MG/1
1 PATCH PERCUTANEOUS; TOPICAL; TRANSDERMAL ONCE
Status: COMPLETED | OUTPATIENT
Start: 2024-08-24 | End: 2024-08-25

## 2024-08-24 RX ORDER — INSULIN LISPRO 100 [IU]/ML
0-10 INJECTION, SOLUTION INTRAVENOUS; SUBCUTANEOUS
Status: DISCONTINUED | OUTPATIENT
Start: 2024-08-24 | End: 2024-08-31 | Stop reason: HOSPADM

## 2024-08-24 RX ORDER — CEFEPIME 1 G/50ML
2 INJECTION, SOLUTION INTRAVENOUS ONCE
Status: COMPLETED | OUTPATIENT
Start: 2024-08-24 | End: 2024-08-24

## 2024-08-24 RX ORDER — HYDRALAZINE HYDROCHLORIDE 25 MG/1
100 TABLET, FILM COATED ORAL EVERY 8 HOURS
Status: DISCONTINUED | OUTPATIENT
Start: 2024-08-24 | End: 2024-08-27

## 2024-08-24 RX ORDER — PANTOPRAZOLE SODIUM 40 MG/1
40 TABLET, DELAYED RELEASE ORAL
Status: DISCONTINUED | OUTPATIENT
Start: 2024-08-25 | End: 2024-08-31 | Stop reason: HOSPADM

## 2024-08-24 RX ORDER — CALCITRIOL 0.5 UG/1
0.5 CAPSULE ORAL DAILY
Status: DISCONTINUED | OUTPATIENT
Start: 2024-08-25 | End: 2024-08-31 | Stop reason: HOSPADM

## 2024-08-24 RX ADMIN — ASPIRIN 81 MG 81 MG: 81 TABLET ORAL at 20:50

## 2024-08-24 RX ADMIN — CEFEPIME 2 G: 1 INJECTION, SOLUTION INTRAVENOUS at 15:34

## 2024-08-24 RX ADMIN — HYDRALAZINE HYDROCHLORIDE 100 MG: 25 TABLET ORAL at 18:23

## 2024-08-24 RX ADMIN — PRAVASTATIN SODIUM 10 MG: 20 TABLET ORAL at 20:50

## 2024-08-24 RX ADMIN — AZITHROMYCIN 500 MG: 500 INJECTION, POWDER, LYOPHILIZED, FOR SOLUTION INTRAVENOUS at 16:11

## 2024-08-24 RX ADMIN — CARVEDILOL 37.5 MG: 25 TABLET, FILM COATED ORAL at 20:50

## 2024-08-24 RX ADMIN — LIDOCAINE 1 PATCH: 4 PATCH TOPICAL at 22:04

## 2024-08-24 RX ADMIN — INSULIN GLARGINE 4 UNITS: 100 INJECTION, SOLUTION SUBCUTANEOUS at 22:04

## 2024-08-24 RX ADMIN — OXYCODONE HYDROCHLORIDE 2.5 MG: 5 TABLET ORAL at 20:43

## 2024-08-24 SDOH — SOCIAL STABILITY: SOCIAL INSECURITY: ARE THERE ANY APPARENT SIGNS OF INJURIES/BEHAVIORS THAT COULD BE RELATED TO ABUSE/NEGLECT?: NO

## 2024-08-24 SDOH — SOCIAL STABILITY: SOCIAL INSECURITY: WERE YOU ABLE TO COMPLETE ALL THE BEHAVIORAL HEALTH SCREENINGS?: YES

## 2024-08-24 SDOH — SOCIAL STABILITY: SOCIAL INSECURITY: ABUSE: ADULT

## 2024-08-24 SDOH — SOCIAL STABILITY: SOCIAL INSECURITY: HAVE YOU HAD ANY THOUGHTS OF HARMING ANYONE ELSE?: NO

## 2024-08-24 SDOH — SOCIAL STABILITY: SOCIAL INSECURITY: DOES ANYONE TRY TO KEEP YOU FROM HAVING/CONTACTING OTHER FRIENDS OR DOING THINGS OUTSIDE YOUR HOME?: NO

## 2024-08-24 SDOH — SOCIAL STABILITY: SOCIAL INSECURITY: HAS ANYONE EVER THREATENED TO HURT YOUR FAMILY OR YOUR PETS?: NO

## 2024-08-24 SDOH — SOCIAL STABILITY: SOCIAL INSECURITY: HAVE YOU HAD THOUGHTS OF HARMING ANYONE ELSE?: NO

## 2024-08-24 SDOH — SOCIAL STABILITY: SOCIAL INSECURITY: DO YOU FEEL UNSAFE GOING BACK TO THE PLACE WHERE YOU ARE LIVING?: NO

## 2024-08-24 SDOH — SOCIAL STABILITY: SOCIAL INSECURITY: ARE YOU OR HAVE YOU BEEN THREATENED OR ABUSED PHYSICALLY, EMOTIONALLY, OR SEXUALLY BY ANYONE?: NO

## 2024-08-24 SDOH — SOCIAL STABILITY: SOCIAL INSECURITY: DO YOU FEEL ANYONE HAS EXPLOITED OR TAKEN ADVANTAGE OF YOU FINANCIALLY OR OF YOUR PERSONAL PROPERTY?: NO

## 2024-08-24 ASSESSMENT — LIFESTYLE VARIABLES
AUDIT-C TOTAL SCORE: 0
AUDIT-C TOTAL SCORE: 0
PRESCIPTION_ABUSE_PAST_12_MONTHS: NO
HAVE YOU EVER FELT YOU SHOULD CUT DOWN ON YOUR DRINKING: NO
EVER FELT BAD OR GUILTY ABOUT YOUR DRINKING: NO
SUBSTANCE_ABUSE_PAST_12_MONTHS: NO
HOW OFTEN DO YOU HAVE 6 OR MORE DRINKS ON ONE OCCASION: NEVER
HOW MANY STANDARD DRINKS CONTAINING ALCOHOL DO YOU HAVE ON A TYPICAL DAY: PATIENT DOES NOT DRINK
EVER HAD A DRINK FIRST THING IN THE MORNING TO STEADY YOUR NERVES TO GET RID OF A HANGOVER: NO
HOW OFTEN DO YOU HAVE A DRINK CONTAINING ALCOHOL: NEVER
HAVE PEOPLE ANNOYED YOU BY CRITICIZING YOUR DRINKING: NO
TOTAL SCORE: 0
SKIP TO QUESTIONS 9-10: 1

## 2024-08-24 ASSESSMENT — ACTIVITIES OF DAILY LIVING (ADL)
FEEDING YOURSELF: INDEPENDENT
GROOMING: NEEDS ASSISTANCE
ADEQUATE_TO_COMPLETE_ADL: YES
LACK_OF_TRANSPORTATION: NO
PATIENT'S MEMORY ADEQUATE TO SAFELY COMPLETE DAILY ACTIVITIES?: YES
BATHING: NEEDS ASSISTANCE
HEARING - LEFT EAR: FUNCTIONAL
HEARING - RIGHT EAR: FUNCTIONAL
TOILETING: NEEDS ASSISTANCE
DRESSING YOURSELF: NEEDS ASSISTANCE
JUDGMENT_ADEQUATE_SAFELY_COMPLETE_DAILY_ACTIVITIES: YES
WALKS IN HOME: NEEDS ASSISTANCE

## 2024-08-24 ASSESSMENT — COGNITIVE AND FUNCTIONAL STATUS - GENERAL
WALKING IN HOSPITAL ROOM: A LITTLE
DAILY ACTIVITIY SCORE: 18
CLIMB 3 TO 5 STEPS WITH RAILING: A LITTLE
DRESSING REGULAR UPPER BODY CLOTHING: A LITTLE
EATING MEALS: A LITTLE
PATIENT BASELINE BEDBOUND: NO
STANDING UP FROM CHAIR USING ARMS: A LITTLE
MOVING FROM LYING ON BACK TO SITTING ON SIDE OF FLAT BED WITH BEDRAILS: A LITTLE
MOVING TO AND FROM BED TO CHAIR: A LITTLE
TOILETING: A LITTLE
MOBILITY SCORE: 18
PERSONAL GROOMING: A LITTLE
DRESSING REGULAR LOWER BODY CLOTHING: A LITTLE
HELP NEEDED FOR BATHING: A LITTLE
TURNING FROM BACK TO SIDE WHILE IN FLAT BAD: A LITTLE

## 2024-08-24 ASSESSMENT — PAIN SCALES - GENERAL
PAINLEVEL_OUTOF10: 10 - WORST POSSIBLE PAIN
PAINLEVEL_OUTOF10: 4

## 2024-08-24 ASSESSMENT — PAIN - FUNCTIONAL ASSESSMENT: PAIN_FUNCTIONAL_ASSESSMENT: 0-10

## 2024-08-24 ASSESSMENT — PAIN DESCRIPTION - LOCATION: LOCATION: OTHER (COMMENT)

## 2024-08-24 NOTE — SIGNIFICANT EVENT
Senior Staffing Note  Chief complaint:  Cough, nausea, vomiting and diarrhea     HPI:  Manolo Bashir is a 69 y.o. male with PMHx ESRD on iHD (TTS) s/p 2 time renal transplant (1992, 2013), now with impaired allograft function currently on immunosuppression (prednisone, tacrolimus), history of IgG and IgA lambda MGUS (recent hematologic eval including Bmbx with no e/o myeloma), DM2, HTN, prostate cancer s/p radical prostatectomy, baseline urinary incontinence, HCV s/p rx (PCR neg 10/2023) presenting to the ED for pneumonia and gastroparesis    Patient is poor historian, he reported hx of dry cough for 10 days, endorsed chest pain when coughing, but not with breathing, or exertion or rest. He endorsed subjective fever and chills, he reported having watery diarrhea (4-6 times a day) for the last 10 days (per chart patient has chronic diarrhea for months attributed to hemicolectomy), denied any mucous in the stool, blood, or melena. He reported nausea and vomiting for similar duration and decrease PO intake as well. Per chart review patient was diagnosed with gastroparesis based on GES in 4/2024 and was started on Reglan but he reported was not taking his medication for the last couple of days. He still urinate but reported decrease in the amount of urine lately, denied any urinary symptoms.    In the ED patient was febrile (38), hypertensive (171/79) patient reported did not take his home HTN medications today. HR (70s) EKG with NSR. Sating well on RA. Labs with no leukocytosis only significant for chronic microcytic anemia of (8.4) BL 7.2-8.7, chronic thrombocytopenia (123), Flu and COVID are negative. Had CXR which demonstrated right basilar consolidation and CT AP wo contrast only demonstrated bilateral plural effusion and the right basilar consolidation.  He was started on cefepime and azithromycin in the ED     ROS: 12 point ROS negative unless as per HPI     Allergies:  No Known Allergies    Medications prior to  admission:  Prior to Admission medications    Medication Sig Start Date End Date Taking? Authorizing Provider   acetaminophen (Tylenol) 325 mg tablet Take 3 tablets (975 mg) by mouth every 6 hours if needed (pain). 2/20/24 2/19/25  Raad Rolle MD   aspirin 81 mg chewable tablet Chew 1 tablet (81 mg) once daily. 1/9/24 1/8/25  Sumeet Bacon MD   B complex-vitamin C-folic acid (Nephrocaps) 1 mg capsule Take 1 capsule by mouth once daily. 7/3/24 7/3/25  ANÍBAL Armas-CNP, DNP   bumetanide (Bumex) 2 mg tablet Take 1 tablet (2 mg) by mouth 4 times a week. Take bumetanide 2mg once daily on non-dialysis days (Sunday, Monday, Wednesday, Friday) 5/18/24 7/11/24  Daxa Nance MD   calcitriol (Rocaltrol) 0.5 mcg capsule Take 1 capsule (0.5 mcg) by mouth once daily. 7/18/24 7/18/25  Marion Bridges MD   carvedilol (Coreg) 12.5 mg tablet TAKE THREE (3) TABLETS BY MOUTH TWICE DAILY 3/8/24   Marion Bridges MD   cinacalcet (Sensipar) 30 mg tablet Take 1 tablet (30 mg) by mouth once daily. 2/7/24 10/7/28  Kaycee Arroyo MD   Easy Touch Alcohol Prep Pads pads, medicated  1/24/24   Historical Provider, MD   epoetin aida 10,000 unit/mL injection Inject 1 mL (10,000 Units) under the skin every 7 days. Do not start before April 17, 2024. 4/17/24 4/16/25  Kaycee Arroyo MD   glucagon (Gvoke HypoPen 1-Pack) 1 mg/0.2 mL auto-injector Inject 1 mg into the muscle every 15 minutes if needed (For blood glucose 41 to 70 mg/dL and no IV access). 5/18/24   Don Jain MD   hydrALAZINE (Apresoline) 100 mg tablet Take 1 tablet (100 mg) by mouth every 8 hours. 3/16/24 5/12/24  Rocio Lemon MD   insulin glargine (Lantus) 100 unit/mL (3 mL) pen Inject 20 units daily as directed 7/15/24   Estella Quinonez MD   insulin glargine (Lantus) 100 unit/mL injection Inject 8 Units under the skin once every 24 hours for 2 doses. Take as directed per insulin instructions. 5/19/24 5/21/24  Daxa WILLARD  "MD Lee Ann   insulin lispro (HumaLOG) 100 unit/mL injection Inject under the skin 3 times a day with meals. 100-199 2 units 200-299 4 units 300-399 6 units    Thomas Mendosa MD   isosorbide mononitrate ER (Imdur) 60 mg 24 hr tablet Take 1 tablet (60 mg) by mouth once daily. 7/3/24 7/3/25  ZAIRE Armas DNP   lancets (Unilet Super Thin Lancets) 30 gauge misc USE TO TEST BLOOD SUGAR THREE TIMES A DAY 11/22/23   Estella Quinonez MD   loperamide (Imodium) 2 mg capsule Take 1 capsule (2 mg) by mouth 4 times a day as needed for diarrhea. 5/18/24   Don Jain MD   metoclopramide (Reglan) 5 mg tablet Take 1 tablet (5 mg) by mouth 2 times a day. 1 tablet before dinner and 1 tablet at bedtime. 4/22/24 4/22/25  Melia Qiu PA-C   NIFEdipine ER (Adalat CC) 60 mg 24 hr tablet Take 1 tablet (60 mg) by mouth 2 times a day. Do not crush, chew, or split. 8/16/24 10/15/24  ZAIRE Armas DNP   pantoprazole (ProtoNix) 40 mg EC tablet Take 1 tablet (40 mg) by mouth once daily in the morning. Take before meals. Do not crush, chew, or split. Do not start before January 22, 2024. 4/9/24 4/9/25  Raad Rolle MD   pen needle, diabetic (TechLITE Pen Needle) 32 gauge x 5/32\" needle Use 4 a day as directed 7/15/24   Estella Quinonez MD   pravastatin (Pravachol) 10 mg tablet Take 1 tablet (10 mg) by mouth once daily at bedtime. 6/19/24 12/16/24  Sumeet Bacon MD   predniSONE (Deltasone) 5 mg tablet Take 1 tablet (5 mg) by mouth once daily in the morning. Do not start before January 22, 2024. 5/23/24 5/23/25  Marion Bridges MD   syringe with needle 3 mL 25 x 5/8\" syringe Use to inject epogen. 4/30/24   Kaycee Arroyo MD   tacrolimus (Prograf) 1 mg capsule Take 1 capsule (1 mg) by mouth 2 times a day. 6/14/24 6/14/25  Marion Bridges MD   tacrolimus (Protopic) 0.1 % ointment Apply topically 2 times a day. 3/20/24 3/20/25  ANÍBAL Armas-CNP, DNP   tacrolimus " "(Protopic) 0.1 % ointment Apply topically 2 times a day. 6/24/24 6/24/25  Vinicius Araiza MD   Unilet Super Thin Lancets 30 gauge misc 1 each 3 times a day. 11/29/23   Estella Quinonez MD         Physical exam:  Constitutional: Well-developed male in no acute distress.  HEENT: Normocephalic, atraumatic. PERRL. EOMI. No cervical lymphadenopathy.  Respiratory: Decrease bilateral basal breathing sound.  Normal respiratory effort.  Cardiovascular: RRR. No murmurs, gallops, or rubs. No JVD. Radial pulses 2+.  Abdominal: Soft, nondistended, nontender to palpation. Bowel sounds present. No hepatosplenomegaly or masses. No CVA tenderness.  Neuro: CN II-XII intact. UE and LE strength 5/5 bilaterally and sensation intact. Normal FTN testing.  MSK: No LE edema bilaterally.  Skin: Warm, dry. No rashes or wounds.  Psych: Appropriate mood and affect.      VS:  /78   Pulse 78   Temp (!) 38 °C (100.4 °F) (Temporal)   Resp 16   Ht 1.727 m (5' 8\")   Wt 72.6 kg (160 lb)   SpO2 94%   BMI 24.33 kg/m²      Labs:  Results from last 7 days   Lab Units 08/24/24  1221   SODIUM mmol/L 134*   POTASSIUM mmol/L 3.7   CHLORIDE mmol/L 96*   CO2 mmol/L 28   BUN mg/dL 12   CREATININE mg/dL 3.09*   CALCIUM mg/dL 9.1      Results from last 7 days   Lab Units 08/24/24  1221   HEMOGLOBIN g/dL 8.4*   WBC AUTO x10*3/uL 5.4   PLATELETS AUTO x10*3/uL 123*   HEMATOCRIT % 23.7*     Results from last 7 days   Lab Units 08/24/24  1221   ALK PHOS U/L 68   BILIRUBIN TOTAL mg/dL 0.5   PROTEIN TOTAL g/dL 6.9   ALT U/L 8*   AST U/L 14               Imaging:  CXR (8/24/2024):  IMPRESSION:  Sizable area of right basilar consolidation consistent with pneumonia.      There is increased generalized prominence of the perihilar and  basilar interstitium and a superimposed component of edema suggested    CT AP wo contrast (8/24/2024)  IMPRESSION:  1.  Bilateral pleural effusions right-greater-than-left with the  ground-glass opacities in the bilateral lower " lobes. Findings can be  seen with pulmonary edema..  2. No acute pathology within the abdomen or pelvis to explain  right-sided abdominal pain.      Assessment and plan:  Manolo Bashir is a 69 y.o. male with PMHx ESRD on iHD (TTS) s/p 2 time renal transplant (1992, 2013), now with impaired allograft function currently on immunosuppression (prednisone, tacrolimus), history of IgG and IgA lambda MGUS (recent hematologic eval including Bmbx with no e/o myeloma), DM2, HTN, prostate cancer s/p radical prostatectomy, baseline urinary incontinence, HCV s/p rx (PCR neg 10/2023) presenting to the ED with cough, nausea, vomiting and diarrhea.     Patient was found to have right basilar consolidation on CXR and CT, labs with no leukocytosis, will continue cefepime and azithromycin for the management of CAP, and follow up on Bcx. For the nausea and vomiting most likely related to gastroparesis patient reported was not taking his home Reglan, for the diarrhea will send infection workup, most likely is related to hx of hemicolectomy.     #Pneumonia    :: CXR which demonstrated right basilar consolidation   :: CT AP wo contrast only demonstrated bilateral plural effusion and the right basilar consolidation.  :: No leukocytosis  :: Started on cefepime and azithromycin in the ED   Plan:  - c/w Cefepime and azithromycin for CAP management  - Added urinary strep/legionella  - Ordered procal  - Send Bcx 2  - Follow up on UA with reflex    #ESRD   :: on iHD (TTS) s/p 2 time renal transplant (1992, 2013)  :: now with impaired allograft function currently on immunosuppression (prednisone, tacrolimus)  :: Consulted transplant nephrology recommended hold tacrolimus 1 mg BID and continue with prednisone 5 mg daily  :: Consulted general nephrology for iHD (TTS)    Plan:  - Hold tacrolimus   - c/w prednisone 5 mg daily      #Gastroparesis   #Nausea and vomiting   :: S/p GES on April 2024 revealing gastroparesis.   :: was seen at the GI clinic  in 6/2024 recommended c/w Reglan dosing of 5mg before dinner and at bedtime and Pantoprazole 40 mg daily  :: According to the patient he was not taking his Reglan for couple of days     Plan:  - c/w home PPI and Reglan    #Chronic diarrhea   :: (secondary to h/o hemicolectomy), on Loperamide.  :: was seen at the GI clinic in 6/2024  :: Colonoscopy on 8/2023 - No polyps found. Repeat surveillance in 7 yrs (2030)   :: Patient is tolerating diet in the ED, and euvolemic     Plan:  - Follow up on stool pathogen panel and c.diff ordered in the ED  - Hold Loperamide    #DM2  :: Last A1c 6.6 (7/10/2024)  :: Follow up with Endo  :: Home insulin (Glargine 20 units at bed time and SSI)  ::  on presentation, patient is not eating as usual    Plan:  - Will start with 4 units of glargine tonight, will increase the dose based on the AM BG  - c/w SSI #2    #HTN  :: on (Hydralazine 100 mg TID, carvedilol 37.5 mg, BID, Nifedipine 60 mg daily, Bumex 2 mg QID on non iHD days)  :: /79 on presentations  Plan:  - c/w Hydralazine 100 mg TID, carvedilol 37.5 mg, BID  - Hold Nifedipine 60 mg daily, Bumex 2 mg, will evaluate BP over the next 24hs    #Anemia  #Chronic thrombocytopenia   :: Hb 8.4 BL 7.2-8.7  :: Iron 13, TIBC 143 (L), sat 9 (L), Ferritin 985 (H)  :: Most likely related to ESRD    Plan:  - continue to monitor     F: PRN  E: Replete lytes PRN, K>4, Mg >2  Diet: Renal diet  Access: PIV, LUE AVF  Bowel regimen: None     DVT prophylaxis: Heparin sq/SCDs  GI prophylaxis: PPI    Code status: Full code (Discussed with patient at bedside upon admission)   NOK: Amalia Zoe - Friend ([phone])      Patient to be discussed with attending physician Dr. Peña in AM. Please see excellent H&P for comprehensive history and plan.    Miguel Angel Keen MD  Internal Medicine, PGY2

## 2024-08-24 NOTE — PROGRESS NOTES
Internal Medicine Admission Note    Chief complaint:  Cough, diarrhea and body aches.    History of present illness:  Manolo Bashir is a 69 y.o. male presenting with PMHx of ESRD (on dialysis; TThS schedule; last session earlier today 08/24; s/p 2x renal transplant - 1992, 2013), now with impaired allograft function currently on immunosuppression (prednisone, tacrolimus), IgG and IgA lambda MGUS (recent hematologic eval including Bmbx with no e/o myeloma), HTN, prostate cancer (s/p radical prostatectomy), HCV (s/p rx; PCR neg 10/2023), and gastroparesis presenting with cough, diarrhea, and body aches for the last 2 weeks. He reports that his symptoms have worsened over the last 2 weeks. Cough is described as dry, not productive, slightly worse at night, associated with chest pain, no nasal or chest congestion, no shortness of breath. He also has about 5-6 watery bowel movements per day, not bloody or mucoid. There is nausea and vomiting, no significant abdominal pain, has chills but no fever. He endorses headaches, and fatigue. He has been taking tylenol daily with minimal improvement.    Per chart review, patient has chronic diarrhea for months attributed to hemicolectomy. Also, he was diagnosed with gastroparesis based on GES in 4/2024 and was started on Reglan but he reported not taking his medication for the last couple of days.     ED course:   Patient presented 08/24/24 with a temperature of 38, hypertensive (171/79) patient reported did not take his home HTN medications today. HR (70s) EKG with NSR. Sating well on RA. Labs with no leukocytosis only significant for microcytic anemia of (8.4; BL 7.2-8.7), thrombocytopenia (123), Flu and COVID are negative. Had CXR which demonstrated right basilar consolidation and CT AP wo contrast demonstrated bilateral plural effusion and the right basilar consolidation.  He was started on cefepime and azithromycin in the ED.    Review of systems  Constitutional:   Positive for appetite change, chills and fatigue.   HENT:  Negative for congestion, ear discharge, ear pain, facial swelling, mouth sores, nosebleeds, postnasal drip, rhinorrhea, sneezing, sore throat and trouble swallowing.    Eyes:  Positive for redness and itching. Negative for photophobia, pain and visual disturbance.   Respiratory:  Positive for cough. Negative for chest tightness and shortness of breath.    Cardiovascular:  Positive for chest pain. Negative for palpitations and leg swelling.   Gastrointestinal:  Positive for diarrhea, nausea and vomiting. Negative for abdominal distention, abdominal pain, blood in stool and constipation.   Endocrine: Negative for polydipsia and polyuria.   Genitourinary:  Positive for decreased urine volume. Negative for difficulty urinating and dysuria.   Musculoskeletal:  Positive for myalgias.   Skin:  Negative for rash and wound.   Allergic/Immunologic: Positive for immunocompromised state. Negative for food allergies.   Neurological:  Positive for weakness and headaches. Negative for dizziness and light-headedness.    Past Medical History:  He has a past medical history of Anemia, Arthritis, Cataract, Chronic kidney disease, stage 3 unspecified (Multi) (09/26/2018), CKD (chronic kidney disease), COVID-19 (06/18/2020), Diabetes (Multi), ESRD (end stage renal disease) (Multi), Focal and segmental proliferative glomerulonephritis (12/23/2023), HTN (hypertension), Hyperlipidemia, Other long term (current) drug therapy (07/20/2021), Personal history of other diseases of the circulatory system, Personal history of other infectious and parasitic diseases (08/17/2015), Polyp, colonic (08/17/2023), Primary osteoarthritis of both ankles (08/17/2023), Prostate cancer (Multi), Tubular adenoma of colon (08/17/2023), and Unspecified kidney failure (08/17/2016).    Past Surgical History:  He has a past surgical history that includes Prostatectomy (10/11/2013); Ileostomy (04/25/2017);  Other surgical history (04/21/2017); Ileostomy closure (08/17/2015); Other surgical history (08/17/2015); Other surgical history (08/17/2015); US guided percutaneous peritoneal or retroperitoneal fluid collection drainage (10/20/2022); transplant, kidney, open (1992); transplant, kidney, open (2013); and US guided percutaneous biopsy renal left (Left, 11/20/2023).     Social history:  He reports that he has never smoked. He has been exposed to tobacco smoke. He has never used smokeless tobacco. He reports current alcohol use. He reports current drug use. Drug: Oxycodone.    Allergies:  Patient has no known allergies.    Home medications:  Prior to Admission medications    Medication Sig Start Date End Date Taking? Authorizing Provider   acetaminophen (Tylenol) 325 mg tablet Take 3 tablets (975 mg) by mouth every 6 hours if needed (pain). 2/20/24 2/19/25 Yes Raad Rolle MD   aspirin 81 mg chewable tablet Chew 1 tablet (81 mg) once daily. 1/9/24 1/8/25 Yes Sumeet Bacon MD   B complex-vitamin C-folic acid (Nephrocaps) 1 mg capsule Take 1 capsule by mouth once daily. 7/3/24 7/3/25 Yes ANÍBAL Armas-CNP, DNP   calcitriol (Rocaltrol) 0.5 mcg capsule Take 1 capsule (0.5 mcg) by mouth once daily. 7/18/24 7/18/25 Yes Marion Bridges MD   carvedilol (Coreg) 12.5 mg tablet TAKE THREE (3) TABLETS BY MOUTH TWICE DAILY 3/8/24  Yes Marion Bridges MD   cinacalcet (Sensipar) 30 mg tablet Take 1 tablet (30 mg) by mouth once daily. 2/7/24 10/7/28 Yes Kaycee Arroyo MD   insulin glargine (Lantus) 100 unit/mL (3 mL) pen Inject 20 units daily as directed 7/15/24  Yes Estella Quinonez MD   insulin lispro (HumaLOG) 100 unit/mL injection Inject under the skin 3 times a day with meals. 100-199 2 units 200-299 4 units 300-399 6 units   Yes Historical Provider, MD   isosorbide mononitrate ER (Imdur) 60 mg 24 hr tablet Take 1 tablet (60 mg) by mouth once daily. 7/3/24 7/3/25 Yes Elma Hutton,  APRN-CNP, DNP   loperamide (Imodium) 2 mg capsule Take 1 capsule (2 mg) by mouth 4 times a day as needed for diarrhea. 5/18/24  Yes Don Jain MD   NIFEdipine ER (Adalat CC) 60 mg 24 hr tablet Take 1 tablet (60 mg) by mouth 2 times a day. Do not crush, chew, or split. 8/16/24 10/15/24 Yes ZAIRE Armas DNP   pantoprazole (ProtoNix) 40 mg EC tablet Take 1 tablet (40 mg) by mouth once daily in the morning. Take before meals. Do not crush, chew, or split. Do not start before January 22, 2024. 4/9/24 4/9/25 Yes Raad Rolle MD   pravastatin (Pravachol) 10 mg tablet Take 1 tablet (10 mg) by mouth once daily at bedtime. 6/19/24 12/16/24 Yes Sumeet Bacon MD   predniSONE (Deltasone) 5 mg tablet Take 1 tablet (5 mg) by mouth once daily in the morning. Do not start before January 22, 2024. 5/23/24 5/23/25 Yes Marion Bridges MD   tacrolimus (Prograf) 1 mg capsule Take 1 capsule (1 mg) by mouth 2 times a day. 6/14/24 6/14/25 Yes Marion Bridges MD   bumetanide (Bumex) 2 mg tablet Take 1 tablet (2 mg) by mouth 4 times a week. Take bumetanide 2mg once daily on non-dialysis days (Sunday, Monday, Wednesday, Friday) 5/18/24 7/11/24  Daxa Nance MD   Easy Touch Alcohol Prep Pads pads, medicated  1/24/24   Historical Provider, MD   epoetin aida 10,000 unit/mL injection Inject 1 mL (10,000 Units) under the skin every 7 days. Do not start before April 17, 2024.  Patient not taking: Reported on 8/24/2024 4/17/24 4/16/25  Kaycee Arroyo MD   glucagon (Gvoke HypoPen 1-Pack) 1 mg/0.2 mL auto-injector Inject 1 mg into the muscle every 15 minutes if needed (For blood glucose 41 to 70 mg/dL and no IV access). 5/18/24   Don Jain MD   hydrALAZINE (Apresoline) 100 mg tablet Take 1 tablet (100 mg) by mouth every 8 hours. 3/16/24 5/12/24  Rocio Lemon MD   insulin glargine (Lantus) 100 unit/mL injection Inject 8 Units under the skin once every 24 hours for 2 doses. Take as  "directed per insulin instructions. 5/19/24 5/21/24  Daxa Nance MD   lancets (Unilet Super Thin Lancets) 30 gauge misc USE TO TEST BLOOD SUGAR THREE TIMES A DAY 11/22/23   Estella Quinonez MD   metoclopramide (Reglan) 5 mg tablet Take 1 tablet (5 mg) by mouth 2 times a day. 1 tablet before dinner and 1 tablet at bedtime.  Patient not taking: Reported on 8/24/2024 4/22/24 4/22/25  Melia Qiu PA-C   pen needle, diabetic (TechLITE Pen Needle) 32 gauge x 5/32\" needle Use 4 a day as directed 7/15/24   Estella Quinonez MD   syringe with needle 3 mL 25 x 5/8\" syringe Use to inject epogen. 4/30/24   Kaycee Arroyo MD   tacrolimus (Protopic) 0.1 % ointment Apply topically 2 times a day. 3/20/24 3/20/25  Elma Hutton, APRN-CNP, DNP   tacrolimus (Protopic) 0.1 % ointment Apply topically 2 times a day. 6/24/24 6/24/25  Vinicius Araiza MD   Unilet Super Thin Lancets 30 gauge misc 1 each 3 times a day. 11/29/23   Estella Quinonez MD          Objective     Vital signs:  Blood pressure 171/79, pulse 78, temperature (!) 38 °C (100.4 °F), temperature source Temporal, resp. rate 16, height 1.727 m (5' 8\"), weight 72.6 kg (160 lb), SpO2 (!) 91%.    Physical exam:  Physical Exam  Constitutional:       General: He is not in acute distress.     Appearance: Normal appearance. He is not ill-appearing.   HENT:      Head: Normocephalic and atraumatic.      Nose: Nose normal. No congestion or rhinorrhea.      Mouth/Throat:      Mouth: Mucous membranes are moist.      Pharynx: No oropharyngeal exudate or posterior oropharyngeal erythema.   Eyes:      Extraocular Movements: Extraocular movements intact.      Conjunctiva/sclera:      Left eye: Left conjunctiva is injected.      Pupils: Pupils are equal, round, and reactive to light.   Cardiovascular:      Rate and Rhythm: Normal rate and regular rhythm.      Pulses: Normal pulses.      Heart sounds: Normal heart sounds.   Pulmonary:      Effort: Pulmonary effort is " normal.      Breath sounds: Examination of the right-middle field reveals decreased breath sounds. Examination of the right-lower field reveals decreased breath sounds. Examination of the left-lower field reveals decreased breath sounds. Decreased breath sounds present.   Abdominal:      General: There is no distension.      Palpations: Abdomen is soft.      Tenderness: There is no guarding.      Comments: Vague RUQ tenderness   Musculoskeletal:         General: No swelling. Normal range of motion.      Cervical back: Normal range of motion and neck supple.   Skin:     General: Skin is dry.   Neurological:      General: No focal deficit present.      Mental Status: He is alert and oriented to person, place, and time.   Psychiatric:         Mood and Affect: Mood normal.         Medications:  aspirin, 81 mg, oral, Daily  [START ON 8/25/2024] azithromycin, 500 mg, oral, q24h ZOË  B complex-vitamin C-folic acid, 1 capsule, oral, Daily  [Held by provider] bumetanide, 2 mg, oral, Once per day on Sunday Tuesday Thursday Saturday  [START ON 8/25/2024] calcitriol, 0.5 mcg, oral, Daily  carvedilol, 37.5 mg, oral, BID  [START ON 8/25/2024] cefepime, 2 g, intravenous, q24h  cinacalcet, 30 mg, oral, Daily  heparin (porcine), 5,000 Units, subcutaneous, q8h  hydrALAZINE, 100 mg, oral, q8h  insulin glargine, 4 Units, subcutaneous, Nightly  insulin lispro, 0-10 Units, subcutaneous, TID  [Held by provider] NIFEdipine ER, 60 mg, oral, BID  [START ON 8/25/2024] pantoprazole, 40 mg, oral, Daily before breakfast  pravastatin, 10 mg, oral, Nightly  [START ON 8/25/2024] predniSONE, 5 mg, oral, q AM         PRN medications: acetaminophen, dextrose, dextrose, glucagon, glucagon    Scheduled Medications:  PRN Medications:    aspirin, 81 mg, oral, Daily  [START ON 8/25/2024] azithromycin, 500 mg, oral, q24h ZOË  B complex-vitamin C-folic acid, 1 capsule, oral, Daily  [Held by provider] bumetanide, 2 mg, oral, Once per day on Sunday Tuesday  Thursday Saturday  [START ON 8/25/2024] calcitriol, 0.5 mcg, oral, Daily  carvedilol, 37.5 mg, oral, BID  [START ON 8/25/2024] cefepime, 2 g, intravenous, q24h  cinacalcet, 30 mg, oral, Daily  heparin (porcine), 5,000 Units, subcutaneous, q8h  hydrALAZINE, 100 mg, oral, q8h  insulin glargine, 4 Units, subcutaneous, Nightly  insulin lispro, 0-10 Units, subcutaneous, TID  [Held by provider] NIFEdipine ER, 60 mg, oral, BID  [START ON 8/25/2024] pantoprazole, 40 mg, oral, Daily before breakfast  pravastatin, 10 mg, oral, Nightly  [START ON 8/25/2024] predniSONE, 5 mg, oral, q AM     PRN medications: acetaminophen, dextrose, dextrose, glucagon, glucagon       Labs:    CBC:  Results from last 7 days   Lab Units 08/24/24  1221   WBC AUTO x10*3/uL 5.4   HEMOGLOBIN g/dL 8.4*   PLATELETS AUTO x10*3/uL 123*     BMP:  Results from last 7 days   Lab Units 08/24/24  1221   SODIUM mmol/L 134*   POTASSIUM mmol/L 3.7   CHLORIDE mmol/L 96*   CO2 mmol/L 28   BUN mg/dL 12   CREATININE mg/dL 3.09*   GLUCOSE mg/dL 162*     LFT:  Results from last 7 days   Lab Units 08/24/24  1221   AST U/L 14   ALT U/L 8*   ALK PHOS U/L 68   BILIRUBIN TOTAL mg/dL 0.5     Cardiac:      Coag:      ABG:     .  UA:       Last EKG  Encounter Date: 06/26/24   ECG 12 lead   Result Value    Ventricular Rate 68    Atrial Rate 68    VA Interval 174    QRS Duration 146    QT Interval 442    QTC Calculation(Bazett) 469    P Axis 78    R Axis 28    T Axis 38    QRS Count 11    Q Onset 215    P Onset 128    P Offset 187    T Offset 436    QTC Fredericia 461    Narrative    Normal sinus rhythm  Left bundle branch block  Abnormal ECG  When compared with ECG of 14-MAY-2024 13:03,  Sinus rhythm has replaced Wide QRS rhythm    See ED provider note for full interpretation and clinical correlation  Confirmed by Millicent Sheikh ([id number]) on 6/27/2024 12:13:12 AM        Imaging:  CT abdomen pelvis wo IV contrast  Result Date: 8/24/2024  1.  Bilateral pleural effusions  right-greater-than-left with the ground-glass opacities in the bilateral lower lobes. Findings can be seen with pulmonary edema.. 2. No acute pathology within the abdomen or pelvis to explain right-sided abdominal pain.      XR chest 1 view  Result Date: 8/24/2024  Sizable area of right basilar consolidation consistent with pneumonia. There is increased generalized prominence of the perihilar and basilar interstitium and a superimposed component of edema suggested.        Assessment/Plan     Manolo Bashir is a 69 y.o. male with PMHx of ESRD (s/p renal transplant x2), prostate cancer (s/p radical prostatectomy), T2DM, MGUS, HTN who presented with features suggestive of pneumonia and gastroparesis.    Differentials include community acquired pneumonia (based on cough, fever, immunosuppression, imaging findings), C.diff colitis (>5 watery bowel movements per day for over 1 week, fever, immunosuppresion although no typical abdominal pain, or recent abx use), Gastroenteritis (nausea, vomiting, diarrhea, fever), CMV colitis (immunosuppression, fever, diarrhea but no CT Abd suggestive findings), PJP (chronic immunosuppresion, dry cough, fever but no hypoxia or suggestive imaging features).    #Pneumonia  :: Has had multiple hospital admissions for pneumonia.  :: CXR today demonstrated right basilar consolidation; CT AP w/o contrast shows bilateral plural effusion and the right basilar consolidation.  :: Started on cefepime and azithromycin in the ED   - C/w Cefepime 1g dly and Azithromycin 500mg dly.  - F/up urine Strep/Legionella, Procal, blood culture, UA with reflex.    #Diarrhea  :: Prior history following AUS placement in Feb. 2023.  :: H/o hemicolectomy.  :: Has been controlled with Loperamide.  :: Colonoscopy on 8/2023 - No polyps found. Repeat surveillance in 7 yrs (2030).   :: Patient is tolerating diet in the ED, and euvolemic.   - Hold Loperamide.  - F/up on stool pathogen panel and C.diff ordered in the ED.      #Nausea & Vomiting  :: Has gastroparesis; confirmed with GES 04/24.  :: Has been on Reglan 5mg bid (stopped recently), ProtoNix 40mg dly.  - Restart Reglan 5mg bid.  - C/w ProtoNix 40mg dly.    #ESRD s/p renal transplant (X2)  :: Renal transplants in 1992 and 2013;  :: Now with impaired allograft function currently on immunosuppression (on Tacrolimus and Prednisone).  :: Baseline Cr 5.5  :: On TThS iHD schedule; last session (08/24/24)  :: Transplant nephrology has been following and are aware of the current admission.  - Hold Bumex and Tacrolimus.  - C/w Prednisone.  - Strict I&Os    #T2DM  :: Last A1C 6.6 (7/10/24).  :: Home regimen: insulin glargine 20 units daily and SSI.  ::  on presentation, patient is not eating as usual.   - Continue basal Lantus at 4 units, will increase dose based on the AM BG.   - Continue on SSI #2.    #Chronic HTN  :: H/o multiple prior admissions for hypervolemia and hypertensive urgency.   :: Has been on (Hydralazine 100 mg TID, carvedilol 37.5 mg, BID, Nifedipine 60 mg daily, Bumex 2 mg QID on non iHD days).  :: /79 on presentations  - Continue home Hydralazine, Coreg.  - Hold Nifedipine, Bumex.    F: PRN  E: Replete lytes PRN, K>4, Mg >2  Diet: Renal diet  DVT ppx: UFH  GI prophylaxis: PPI     Code status: Full Code  Emergency contact: Amalia Zoe (Friend) [phone] (Mobile)       Jose Francisco Oh MD  PGY-1 Internal Medicine Resident

## 2024-08-24 NOTE — ED TRIAGE NOTES
Patient states everything is aching, he has had non-bloody diarrhea and he has been coughing, all for the past 2 weeks; states this is a non-productive cough; endorses having a low grade fever at dialysis today; states he had a full session of HD today    History of prior ESRD on HD (TThS) now s/p 2 time renal transplant (1992, 2013), n, CKD 3, on immunosuppression (prednisone, tacrolimus, azathioprine), history of IgG and IgA lambda MGUS (recent hematologic eval including Bmbx with no e/o myeloma), DM2, HTN, prostate cancer s/p radical prostatectomy, baseline urinary incontinence, HCV s/p rx (PCR neg 10/2023)

## 2024-08-24 NOTE — CARE PLAN
The patient's goals for the shift include Patient will tolerate diet with no c/o of N/V Diarrhea this shift    The clinical goals for the shift include Patient will have no c/o of Pain or rate Pian <4 this shift

## 2024-08-24 NOTE — HOSPITAL COURSE
Manolo Bashir is a 69 y.o. male who presented with dry cough and vomiting and had a 1-day uncomplicated hospitalization for Pneumonia symptoms.     He has a PMHx of ESRD (on dialysis; TThS schedule; last session earlier today 08/24; s/p 2x renal transplant - 1992, 2013), now with impaired allograft function currently on immunosuppression (prednisone, tacrolimus), IgG and IgA lambda MGUS (recent hematologic eval including Bmbx with no e/o myeloma), HTN, prostate cancer (s/p radical prostatectomy), HCV (s/p rx; PCR neg 10/2023), and gastroparesis and presented to the ED on 08/24/24 with dry cough and vomiting.    In the ED, patient was febrile (38), hypertensive (171/79) - patient reportedly did not take his home HTN medications that day. HR (70s) EKG with NSR. Sating well on RA. Labs with no leukocytosis only significant for microcytic anemia of (8.4; baseline 7.2-8.7, thrombocytopenia (123), Flu and COVID are negative. Had CXR which demonstrated right basilar consolidation and CT AP w/o contrast only demonstrated bilateral plural effusion and right basilar consolidation. He was started on cefepime and azithromycin in the ED and transferred to the floor same day.    C. Diff testing PCR (+), EIA (-) in the setting of diarrhea. On the floor, he was commenced on PO Vancomycin for c. Diff decolonization and IV Zosyn (8/25-8/28) transitioned to Unasyn 8/28, with an initial 3-day course of Azithromycin for suspected pneumonia. He was also given erythromycin, which was transitioned to metoclopramide, for gastroparesis.    He had labile BP throughout his course. He arrive hypertensive and was hypotensive on 8/26 with concerns that erythromycin had increased effects of home nifedipine.     Patient was resumed on home hypertensive medication regimen of carvedilol, imdur, and nifedipine and home dose, with reduction in hydralazine dose to 50 mg TID prior to discharge.     He was also seen by transplant nephrology during this  admission with recommendation to resume tacrolimus 0.5 mg BID and increase of prednisone to 10 mg for 10 days with taper back to 5 mg and follow up outpatient in 1-2 months.     As at the time of discharge to home on 09/01/24, patient has symptomatically improved and has shown sustained clinical improvement.

## 2024-08-24 NOTE — ED PROVIDER NOTES
Emergency Department Provider Note        History of Present Illness     History provided by: Patient  Limitations to History: None  External Records Reviewed with Brief Summary: None    HPI:  Manolo Bashir is a 69 y.o. male w/ a PMHx of ESRD on dialysis, s/p 2 x renal transplant (, ), MGUS, HTN, prostate cancer s/p radical prostatectomy presenting with chills, cough, diarrhea, and myalgias for the last 2 weeks. He reports that his symptoms have got worse over the last 2 weeks. He is not sure if he has had a fever as he was not checking his temp at home. He is having about 5-6 liquid bowel movements per day. He is struggling to keep any food down. Cough is non-productive. He has been taking tylenol daily with minimal improvement. He endorses nausea, vomiting, headaches, shortness of breath, and fatigue.      Physical Exam   Triage vitals:  T (!) 38 °C (100.4 °F)  HR 84  /78  RR 16  O2 97 % None (Room air)    Physical Exam  Constitutional:       Appearance: Normal appearance. He is ill-appearing.   Cardiovascular:      Rate and Rhythm: Normal rate and regular rhythm.      Pulses: Normal pulses.      Heart sounds: Normal heart sounds.   Pulmonary:      Effort: Pulmonary effort is normal.      Comments: Coarse breath sounds in left lower lobe.   Abdominal:      General: Abdomen is flat. Bowel sounds are normal. There is no distension.      Palpations: Abdomen is soft.      Tenderness: There is abdominal tenderness (RUQ and RLQ). There is no rebound.   Musculoskeletal:         General: No swelling.   Neurological:      General: No focal deficit present.      Mental Status: He is alert and oriented to person, place, and time.        Medical Decision Making & ED Course   Medical Decision Makin y.o. male presenting with 2 weeks of nonproductive cough, fever, chills, nausea, vomiting, shortness of breath, abdominal pain, diarrhea, and myalgias. Febrile at 100.4 F. 97% on room air. Coarse breath  sounds in left lower lobe.     ECG revealed normal sinus rhythm with no ST or T wave changes compared to previous ECG (6/26/24), LVH. No leukocytosis. VBG revealed metabolic alkalosis (pH 7.47, HCO3 30.6). CMP at baseline. COVID and Influenza PCR negative. HIV negative. ECG revealed sinus rhythm with no concerning ST or T wave abnormalities.    CXR showed sizable area of right basilar consolidation consistent with pneumonia. There is increased generalized prominence of the perihilar and basilar interstitium and a superimposed component of edema suggested. Patient was started on cefepime and azithro for empiric coverage. CT abdomen pelvis wo contrast revealed bilateral pleural effusions right-greater-than-left with the ground-glass opacities in the bilateral lower lobes. Findings can be  seen with pulmonary edema. No acute pathology within the abdomen or pelvis to explain right-sided abdominal pain. Blood cultures, C. Diff, stool PCR, and urinalysis pending. Admitted to medicine for further workup and management of pneumonia.   ----    Differential diagnoses considered include but are not limited to: diverticulitis vs pneumonia vs c diff vs gastroenteritis vs bacteremia vs COVID vs influenza     Social Determinants of Health which Significantly Impact Care: None identified     EKG Independent Interpretation: EKG interpreted by myself. Please see ED Course for full interpretation.    Independent Result Review and Interpretation: Relevant laboratory and radiographic results were reviewed and independently interpreted by myself.  As necessary, they are commented on in the ED Course.    Chronic conditions affecting the patient's care: As documented above in [facility]    The patient was discussed with the following consultants/services: Admission Coordinator who accepted the patient for admission    Care Considerations: As documented above in [facility]    ED Course:  Diagnoses as of 08/24/24 1621   Fever and chills    Immunocompromised (Multi)   Pneumonia of right lower lobe due to infectious organism   Pneumonia symptoms     Disposition   As a result of their workup, the patient will require admission to the hospital.  The patient was informed of his diagnosis.  The patient was given the opportunity to ask questions and I answered them. The patient agreed to be admitted to the hospital.      Patient seen and discussed with ED attending physician.    Jean Solis MD  Emergency Medicine       Jean Solis MD  Resident  08/24/24 [id number]

## 2024-08-25 ENCOUNTER — APPOINTMENT (OUTPATIENT)
Dept: CARDIOLOGY | Facility: HOSPITAL | Age: 69
End: 2024-08-25
Payer: COMMERCIAL

## 2024-08-25 VITALS
BODY MASS INDEX: 24.25 KG/M2 | DIASTOLIC BLOOD PRESSURE: 58 MMHG | HEIGHT: 68 IN | OXYGEN SATURATION: 95 % | SYSTOLIC BLOOD PRESSURE: 120 MMHG | TEMPERATURE: 99.5 F | HEART RATE: 91 BPM | RESPIRATION RATE: 16 BRPM | WEIGHT: 160 LBS

## 2024-08-25 LAB
ANION GAP SERPL CALC-SCNC: 14 MMOL/L (ref 10–20)
APPEARANCE UR: CLEAR
BACTERIA #/AREA URNS AUTO: ABNORMAL /HPF
BACTERIA BLD CULT: NORMAL
BASOPHILS # BLD AUTO: 0.03 X10*3/UL (ref 0–0.1)
BASOPHILS NFR BLD AUTO: 0.4 %
BILIRUB UR STRIP.AUTO-MCNC: NEGATIVE MG/DL
BUN SERPL-MCNC: 19 MG/DL (ref 6–23)
C DIF TOX TCDA+TCDB STL QL NAA+PROBE: DETECTED
C DIFF TOX A+B STL QL IA: NEGATIVE
CALCIUM SERPL-MCNC: 9.6 MG/DL (ref 8.6–10.6)
CHLORIDE SERPL-SCNC: 96 MMOL/L (ref 98–107)
CO2 SERPL-SCNC: 26 MMOL/L (ref 21–32)
COLOR UR: ABNORMAL
CREAT SERPL-MCNC: 4.1 MG/DL (ref 0.5–1.3)
EGFRCR SERPLBLD CKD-EPI 2021: 15 ML/MIN/1.73M*2
EOSINOPHIL # BLD AUTO: 0.32 X10*3/UL (ref 0–0.7)
EOSINOPHIL NFR BLD AUTO: 4.1 %
ERYTHROCYTE [DISTWIDTH] IN BLOOD BY AUTOMATED COUNT: 13 % (ref 11.5–14.5)
GLUCOSE BLD MANUAL STRIP-MCNC: 117 MG/DL (ref 74–99)
GLUCOSE BLD MANUAL STRIP-MCNC: 150 MG/DL (ref 74–99)
GLUCOSE BLD MANUAL STRIP-MCNC: 161 MG/DL (ref 74–99)
GLUCOSE SERPL-MCNC: 51 MG/DL (ref 74–99)
GLUCOSE UR STRIP.AUTO-MCNC: NORMAL MG/DL
HCT VFR BLD AUTO: 28.5 % (ref 41–52)
HGB BLD-MCNC: 9.3 G/DL (ref 13.5–17.5)
IMM GRANULOCYTES # BLD AUTO: 0.02 X10*3/UL (ref 0–0.7)
IMM GRANULOCYTES NFR BLD AUTO: 0.3 % (ref 0–0.9)
KETONES UR STRIP.AUTO-MCNC: NEGATIVE MG/DL
LEGIONELLA AG UR QL: NEGATIVE
LEUKOCYTE ESTERASE UR QL STRIP.AUTO: NEGATIVE
LYMPHOCYTES # BLD AUTO: 0.69 X10*3/UL (ref 1.2–4.8)
LYMPHOCYTES NFR BLD AUTO: 8.8 %
MAGNESIUM SERPL-MCNC: 1.52 MG/DL (ref 1.6–2.4)
MCH RBC QN AUTO: 27.5 PG (ref 26–34)
MCHC RBC AUTO-ENTMCNC: 32.6 G/DL (ref 32–36)
MCV RBC AUTO: 84 FL (ref 80–100)
MONOCYTES # BLD AUTO: 0.85 X10*3/UL (ref 0.1–1)
MONOCYTES NFR BLD AUTO: 10.8 %
MRSA DNA SPEC QL NAA+PROBE: NOT DETECTED
NEUTROPHILS # BLD AUTO: 5.94 X10*3/UL (ref 1.2–7.7)
NEUTROPHILS NFR BLD AUTO: 75.6 %
NITRITE UR QL STRIP.AUTO: NEGATIVE
NRBC BLD-RTO: 0 /100 WBCS (ref 0–0)
PH UR STRIP.AUTO: 8.5 [PH]
PLATELET # BLD AUTO: 148 X10*3/UL (ref 150–450)
POTASSIUM SERPL-SCNC: 3.6 MMOL/L (ref 3.5–5.3)
PROCALCITONIN SERPL-MCNC: 0.25 NG/ML
PROT UR STRIP.AUTO-MCNC: ABNORMAL MG/DL
RBC # BLD AUTO: 3.38 X10*6/UL (ref 4.5–5.9)
RBC # UR STRIP.AUTO: NEGATIVE /UL
RBC #/AREA URNS AUTO: ABNORMAL /HPF
S PNEUM AG UR QL: NEGATIVE
SODIUM SERPL-SCNC: 132 MMOL/L (ref 136–145)
SP GR UR STRIP.AUTO: 1.01
UROBILINOGEN UR STRIP.AUTO-MCNC: NORMAL MG/DL
WBC # BLD AUTO: 7.9 X10*3/UL (ref 4.4–11.3)
WBC #/AREA URNS AUTO: ABNORMAL /HPF

## 2024-08-25 PROCEDURE — 80048 BASIC METABOLIC PNL TOTAL CA: CPT | Mod: CCI

## 2024-08-25 PROCEDURE — 1100000001 HC PRIVATE ROOM DAILY

## 2024-08-25 PROCEDURE — 87899 AGENT NOS ASSAY W/OPTIC: CPT

## 2024-08-25 PROCEDURE — 2500000004 HC RX 250 GENERAL PHARMACY W/ HCPCS (ALT 636 FOR OP/ED)

## 2024-08-25 PROCEDURE — 99223 1ST HOSP IP/OBS HIGH 75: CPT | Performed by: INTERNAL MEDICINE

## 2024-08-25 PROCEDURE — 84145 PROCALCITONIN (PCT): CPT

## 2024-08-25 PROCEDURE — 87040 BLOOD CULTURE FOR BACTERIA: CPT

## 2024-08-25 PROCEDURE — 82947 ASSAY GLUCOSE BLOOD QUANT: CPT

## 2024-08-25 PROCEDURE — 99254 IP/OBS CNSLTJ NEW/EST MOD 60: CPT | Performed by: INTERNAL MEDICINE

## 2024-08-25 PROCEDURE — 36415 COLL VENOUS BLD VENIPUNCTURE: CPT

## 2024-08-25 PROCEDURE — 80048 BASIC METABOLIC PNL TOTAL CA: CPT

## 2024-08-25 PROCEDURE — 2500000005 HC RX 250 GENERAL PHARMACY W/O HCPCS

## 2024-08-25 PROCEDURE — 83735 ASSAY OF MAGNESIUM: CPT

## 2024-08-25 PROCEDURE — 2500000002 HC RX 250 W HCPCS SELF ADMINISTERED DRUGS (ALT 637 FOR MEDICARE OP, ALT 636 FOR OP/ED)

## 2024-08-25 PROCEDURE — 2500000001 HC RX 250 WO HCPCS SELF ADMINISTERED DRUGS (ALT 637 FOR MEDICARE OP)

## 2024-08-25 PROCEDURE — 93010 ELECTROCARDIOGRAM REPORT: CPT | Performed by: INTERNAL MEDICINE

## 2024-08-25 PROCEDURE — 87449 NOS EACH ORGANISM AG IA: CPT

## 2024-08-25 PROCEDURE — 81003 URINALYSIS AUTO W/O SCOPE: CPT | Performed by: EMERGENCY MEDICINE

## 2024-08-25 PROCEDURE — 93005 ELECTROCARDIOGRAM TRACING: CPT

## 2024-08-25 PROCEDURE — 85025 COMPLETE CBC W/AUTO DIFF WBC: CPT

## 2024-08-25 PROCEDURE — 87641 MR-STAPH DNA AMP PROBE: CPT | Performed by: EMERGENCY MEDICINE

## 2024-08-25 RX ORDER — OXYCODONE HYDROCHLORIDE 5 MG/1
5 TABLET ORAL EVERY 6 HOURS PRN
Status: DISCONTINUED | OUTPATIENT
Start: 2024-08-25 | End: 2024-08-28

## 2024-08-25 RX ORDER — HYDROMORPHONE HYDROCHLORIDE 1 MG/ML
0.2 INJECTION, SOLUTION INTRAMUSCULAR; INTRAVENOUS; SUBCUTANEOUS ONCE
Status: COMPLETED | OUTPATIENT
Start: 2024-08-25 | End: 2024-08-25

## 2024-08-25 RX ORDER — ISOSORBIDE MONONITRATE 30 MG/1
60 TABLET, EXTENDED RELEASE ORAL DAILY
Status: DISCONTINUED | OUTPATIENT
Start: 2024-08-25 | End: 2024-08-31 | Stop reason: HOSPADM

## 2024-08-25 RX ORDER — VANCOMYCIN HYDROCHLORIDE 125 MG/1
125 CAPSULE ORAL 4 TIMES DAILY
Status: DISCONTINUED | OUTPATIENT
Start: 2024-08-25 | End: 2024-08-31 | Stop reason: HOSPADM

## 2024-08-25 RX ORDER — ERYTHROMYCIN ETHYLSUCCINATE 400 MG/5ML
200 SUSPENSION ORAL
Status: DISCONTINUED | OUTPATIENT
Start: 2024-08-25 | End: 2024-08-28

## 2024-08-25 RX ORDER — ONDANSETRON HYDROCHLORIDE 2 MG/ML
4 INJECTION, SOLUTION INTRAVENOUS ONCE
Status: COMPLETED | OUTPATIENT
Start: 2024-08-25 | End: 2024-08-25

## 2024-08-25 RX ORDER — METOCLOPRAMIDE 5 MG/1
5 TABLET ORAL 2 TIMES DAILY
Status: DISCONTINUED | OUTPATIENT
Start: 2024-08-25 | End: 2024-08-25

## 2024-08-25 RX ADMIN — NIFEDIPINE 60 MG: 60 TABLET, FILM COATED, EXTENDED RELEASE ORAL at 20:57

## 2024-08-25 RX ADMIN — HYDRALAZINE HYDROCHLORIDE 100 MG: 25 TABLET ORAL at 00:10

## 2024-08-25 RX ADMIN — ERYTHROMYCIN ETHYLSUCCINATE 200 MG: 400 SUSPENSION ORAL at 16:06

## 2024-08-25 RX ADMIN — PIPERACILLIN SODIUM AND TAZOBACTAM SODIUM 3.38 G: 3; .375 INJECTION, SOLUTION INTRAVENOUS at 20:57

## 2024-08-25 RX ADMIN — OXYCODONE HYDROCHLORIDE 5 MG: 5 TABLET ORAL at 20:57

## 2024-08-25 RX ADMIN — HEPARIN SODIUM 5000 UNITS: 5000 INJECTION INTRAVENOUS; SUBCUTANEOUS at 20:57

## 2024-08-25 RX ADMIN — CARVEDILOL 37.5 MG: 25 TABLET, FILM COATED ORAL at 08:29

## 2024-08-25 RX ADMIN — VANCOMYCIN HYDROCHLORIDE 125 MG: 125 CAPSULE ORAL at 12:07

## 2024-08-25 RX ADMIN — CARVEDILOL 37.5 MG: 25 TABLET, FILM COATED ORAL at 20:57

## 2024-08-25 RX ADMIN — PRAVASTATIN SODIUM 10 MG: 20 TABLET ORAL at 20:57

## 2024-08-25 RX ADMIN — HYDRALAZINE HYDROCHLORIDE 100 MG: 25 TABLET ORAL at 08:29

## 2024-08-25 RX ADMIN — PIPERACILLIN SODIUM AND TAZOBACTAM SODIUM 3.38 G: 3; .375 INJECTION, SOLUTION INTRAVENOUS at 12:08

## 2024-08-25 RX ADMIN — ASPIRIN 81 MG 81 MG: 81 TABLET ORAL at 08:29

## 2024-08-25 RX ADMIN — HYDROMORPHONE HYDROCHLORIDE 0.2 MG: 1 INJECTION, SOLUTION INTRAMUSCULAR; INTRAVENOUS; SUBCUTANEOUS at 08:31

## 2024-08-25 RX ADMIN — NIFEDIPINE 60 MG: 60 TABLET, FILM COATED, EXTENDED RELEASE ORAL at 08:29

## 2024-08-25 RX ADMIN — OXYCODONE HYDROCHLORIDE 2.5 MG: 5 TABLET ORAL at 04:26

## 2024-08-25 RX ADMIN — ASCORBIC ACID, THIAMINE MONONITRATE,RIBOFLAVIN, NIACINAMIDE, PYRIDOXINE HYDROCHLORIDE, FOLIC ACID, CYANOCOBALAMIN, BIOTIN, CALCIUM PANTOTHENATE, 1 CAPSULE: 100; 1.5; 1.7; 20; 10; 1; 6000; 150000; 5 CAPSULE, LIQUID FILLED ORAL at 08:30

## 2024-08-25 RX ADMIN — PANTOPRAZOLE SODIUM 40 MG: 40 TABLET, DELAYED RELEASE ORAL at 06:09

## 2024-08-25 RX ADMIN — CINACALCET 30 MG: 30 TABLET, FILM COATED ORAL at 08:29

## 2024-08-25 RX ADMIN — HYDROMORPHONE HYDROCHLORIDE 0.2 MG: 1 INJECTION, SOLUTION INTRAMUSCULAR; INTRAVENOUS; SUBCUTANEOUS at 00:05

## 2024-08-25 RX ADMIN — VANCOMYCIN HYDROCHLORIDE 125 MG: 125 CAPSULE ORAL at 16:06

## 2024-08-25 RX ADMIN — PREDNISONE 5 MG: 5 TABLET ORAL at 08:30

## 2024-08-25 RX ADMIN — HEPARIN SODIUM 5000 UNITS: 5000 INJECTION INTRAVENOUS; SUBCUTANEOUS at 12:07

## 2024-08-25 RX ADMIN — INSULIN GLARGINE 4 UNITS: 100 INJECTION, SOLUTION SUBCUTANEOUS at 20:53

## 2024-08-25 RX ADMIN — HYDRALAZINE HYDROCHLORIDE 100 MG: 25 TABLET ORAL at 16:07

## 2024-08-25 RX ADMIN — VANCOMYCIN HYDROCHLORIDE 125 MG: 125 CAPSULE ORAL at 20:57

## 2024-08-25 RX ADMIN — ACETAMINOPHEN 650 MG: 325 TABLET ORAL at 04:26

## 2024-08-25 RX ADMIN — AZITHROMYCIN DIHYDRATE 500 MG: 500 TABLET ORAL at 08:30

## 2024-08-25 RX ADMIN — ISOSORBIDE MONONITRATE 60 MG: 60 TABLET, EXTENDED RELEASE ORAL at 12:20

## 2024-08-25 RX ADMIN — HYDRALAZINE HYDROCHLORIDE 100 MG: 25 TABLET ORAL at 06:09

## 2024-08-25 RX ADMIN — CALCITRIOL 0.5 MCG: 0.5 CAPSULE ORAL at 12:06

## 2024-08-25 RX ADMIN — ONDANSETRON 4 MG: 2 INJECTION INTRAMUSCULAR; INTRAVENOUS at 12:07

## 2024-08-25 ASSESSMENT — PAIN SCALES - GENERAL
PAINLEVEL_OUTOF10: 10 - WORST POSSIBLE PAIN
PAINLEVEL_OUTOF10: 10 - WORST POSSIBLE PAIN

## 2024-08-25 ASSESSMENT — PAIN DESCRIPTION - LOCATION
LOCATION: OTHER (COMMENT)
LOCATION: OTHER (COMMENT)

## 2024-08-25 NOTE — CONSULTS
INPATIENT INITIAL TRANSPLANT NEPHROLOGY CONSULT          SERVICE DATE: 8/25/2024   SERVICE TIME:  9:37 AM    REASON FOR CONSULT:  Immunosuppressive medication management     REQUESTING PHYSICIAN: Daniel Peña MD  PRIMARY CARE PHYSICIAN: Elma Hutton, ANÍBAL-CNP, [facility]    ADMISSION DIAGNOSIS:   1. Pneumonia symptoms    2. Fever and chills    3. Immunocompromised (Multi)    4. Pneumonia of right lower lobe due to infectious organism        TRANSPLANT DATE: 7/13/2013 (Kidney), 3/26/1994 (Kidney)    BLOOD TYPE: O    HPI:    Mr. Bashir is a 69 y.o. male with past medical history significant for ESRD sec to HTN on HD since 1988, s/p kidney txp 3/26/1992 that failed 11/13/2006), s/p kidney txp #2 7/13/2013 which recently failed May 2024 following which pt has been on HD (TTS, [facility] via LUE AVG) . He also has hx of MGUS with IgG and IgA lambda (bone marrow bx 10/2023 with no plasma cell dyscrasia), DM2, HTN, prostate cancer s/p radical prostatectomy, baseline urinary incontinence, HCV s/p treatment (recent HCV PCR negative 7/2024).     Pt with h/o frequent admissions over the past 12 months with ISIAH, fluid overload. 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  Patient was treated with 2 doses of rituximab 1gm 1/2024. Allograft function continued to decline and pt was started on RRT 5/2024.     Pt presented to ED with cough, diarrhea, and body aches for the last 2 weeks. He reports that his symptoms have worsened over the last 2 weeks. Cough is described as dry, not productive, slightly worse at night, associated with chest pain, no nasal or chest congestion, no shortness of breath. He also has about 5-6 watery bowel movements per day, not bloody or mucoid. There is nausea and vomiting, no significant abdominal pain, has chills but no fever. He endorses headaches, and  fatigue. He has been taking tylenol daily with minimal improvement. Patient has chronic diarrhea for months attributed to hemicolectomy. Also, he was diagnosed with gastroparesis based on GES in 4/2024 and was started on Reglan but he reported not taking his medication for the last couple of days.     Admitted for pneumonia, diarrhea and N/V.    Transplant nephrology is consulted to assist with immunosuppressive medication management .  REVIEW OF SYSTEM:  Review of system was done system by system (10/14). Apart from HPI, other symptoms were negative.    PAST MEDICAL HISTORY:  Past Medical History:   Diagnosis Date    Anemia     Arthritis     Cataract     Chronic kidney disease, stage 3 unspecified (Multi) 09/26/2018    Stage 3 chronic kidney disease    CKD (chronic kidney disease)     stage V    COVID-19 06/18/2020    COVID-19 virus infection    Diabetes (Multi)     ESRD (end stage renal disease) (Multi)     Focal and segmental proliferative glomerulonephritis 12/23/2023    HTN (hypertension)     Hyperlipidemia     Other long term (current) drug therapy 07/20/2021    High risk medication use    Personal history of other diseases of the circulatory system     Personal history of cardiac murmur    Personal history of other infectious and parasitic diseases 08/17/2015    History of hepatitis    Polyp, colonic 08/17/2023    Primary osteoarthritis of both ankles 08/17/2023    Prostate cancer (Multi)     Tubular adenoma of colon 08/17/2023    Unspecified kidney failure 08/17/2016    Renal failure        PAST SURGICAL HISTORY:  Past Surgical History:   Procedure Laterality Date    ILEOSTOMY  04/25/2017    Ileostomy    ILEOSTOMY CLOSURE  08/17/2015    Ileostomy Closure    OTHER SURGICAL HISTORY  04/21/2017    Right Hemicolectomy    OTHER SURGICAL HISTORY  08/17/2015    Arteriovenous Surgery Creation Of A-V Fistula    OTHER SURGICAL HISTORY  08/17/2015    Sigmoidoscopy (Fiberoptic, Therapeutic )    PROSTATECTOMY  10/11/2013     Prostatectomy Radical    TRANSPLANT, KIDNEY, OPEN  1992    TRANSPLANT, KIDNEY, OPEN  2013    US GUIDED PERCUTANEOUS BIOPSY RENAL LEFT Left 11/20/2023    US GUIDED PERCUTANEOUS BIOPSY RENAL LEFT 11/20/2023 Lou Rodgers MD [facility]    US GUIDED PERCUTANEOUS PERITONEAL OR RETROPERITONEAL FLUID COLLECTION DRAINAGE  10/20/2022    US GUIDED PERCUTANEOUS PERITONEAL OR RETROPERITONEAL FLUID COLLECTION DRAINAGE 10/20/2022 [facility] CLINICAL LEGACY        SOCIAL HISTORY:  Social History     Socioeconomic History    Marital status: Single     Spouse name: Not on file    Number of children: Not on file    Years of education: Not on file    Highest education level: Not on file   Occupational History    Not on file   Tobacco Use    Smoking status: Never     Passive exposure: Past    Smokeless tobacco: Never   Vaping Use    Vaping status: Never Used   Substance and Sexual Activity    Alcohol use: Yes    Drug use: Yes     Types: Oxycodone    Sexual activity: Defer   Other Topics Concern    Not on file   Social History Narrative    Not on file     Social Determinants of Health     Financial Resource Strain: Low Risk  (8/24/2024)    Overall Financial Resource Strain (CARDIA)     Difficulty of Paying Living Expenses: Not hard at all   Food Insecurity: No Food Insecurity (7/3/2024)    Hunger Vital Sign     Worried About Running Out of Food in the Last Year: Never true     Ran Out of Food in the Last Year: Never true   Transportation Needs: No Transportation Needs (8/24/2024)    PRAPARE - Transportation     Lack of Transportation (Medical): No     Lack of Transportation (Non-Medical): No   Physical Activity: Unknown (4/4/2024)    Exercise Vital Sign     Days of Exercise per Week: Patient unable to answer     Minutes of Exercise per Session: 30 min   Stress: No Stress Concern Present (10/3/2023)    [employer] - Occupational Stress Questionnaire     Feeling of Stress : Not at all   Social Connections: Unknown  (10/3/2023)    Social Connection and Isolation Panel [NHANES]     Frequency of Communication with Friends and Family: More than three times a week     Frequency of Social Gatherings with Friends and Family: Twice a week     Attends Tenriism Services: Patient declined     Active Member of Clubs or Organizations: Patient declined     Attends Club or Organization Meetings: Patient declined     Marital Status: Never    Intimate Partner Violence: Not At Risk (10/3/2023)    Humiliation, Afraid, Rape, and Kick questionnaire     Fear of Current or Ex-Partner: No     Emotionally Abused: No     Physically Abused: No     Sexually Abused: No   Housing Stability: Low Risk  (8/24/2024)    Housing Stability Vital Sign     Unable to Pay for Housing in the Last Year: No     Number of Times Moved in the Last Year: 1     Homeless in the Last Year: No       FAMILY HISTORY:  Family History   Problem Relation Name Age of Onset    Bone cancer Mother      Other (corona's sarcome of the bone marrow) Mother      Prostate cancer Father      Diabetes Other Family Hist     Hypertension Other Family Hist        MEDICATION LIST:  aspirin, 81 mg, Daily  azithromycin, 500 mg, q24h ZOË  B complex-vitamin C-folic acid, 1 capsule, Daily  [Held by provider] bumetanide, 2 mg, Once per day on Sunday Tuesday Thursday Saturday  calcitriol, 0.5 mcg, Daily  carvedilol, 37.5 mg, BID  cinacalcet, 30 mg, Daily  heparin (porcine), 5,000 Units, q8h  hydrALAZINE, 100 mg, q8h  insulin glargine, 4 Units, Nightly  insulin lispro, 0-10 Units, TID  NIFEdipine ER, 60 mg, BID  pantoprazole, 40 mg, Daily before breakfast  piperacillin-tazobactam, 3.375 g, q6h  pravastatin, 10 mg, Nightly  predniSONE, 5 mg, q AM  vancomycin, 125 mg, 4x daily         acetaminophen, 650 mg, q6h PRN  dextrose, 12.5 g, q15 min PRN  dextrose, 25 g, q15 min PRN  glucagon, 1 mg, q15 min PRN  glucagon, 1 mg, q15 min PRN  oxyCODONE, 5 mg, q6h PRN        ALLERGY:  No Known  "Allergies    PHYCISCAL EXAMINATION:  Visit Vitals  BP (!) 188/84   Pulse 83   Temp 36.6 °C (97.9 °F) (Temporal)   Resp 16   Ht 1.727 m (5' 8\")   Wt 72.6 kg (160 lb)   SpO2 100%   BMI 24.33 kg/m²   Smoking Status Never   BSA 1.87 m²        08/23 1900 - 08/25 0659  In: 240 [P.O.:240]  Out: 125 [Urine:125]       General Appearance - NAD, Good speech, oriented and alert  HEENT - Supple. Not pale. No jaundice. No cervical lymphadenopathy. Pharynx and tonsils are not injected.  CVS - RRR. Normal S1/S2. No murmur, click , rub or gallop  Lungs- clear to auscultation bilaterally  Abdomen - soft , not tender, no guarding, no rigidity. No hepatosplenomegaly. Normal bowel sounds. No masses and ascites. S/P Kidney transplant .  Transplanted kidney is not tender.   Musculoskeletal /Extremities - no edema. Full ROM. No joint tenderness.   Neuro/Psych - appropriate mood and affect. Motor power V/V all extremities. CN I -XII were grossly intact.  Skin - No visible rash    LABS:  Results for orders placed or performed during the hospital encounter of 08/24/24 (from the past 24 hour(s))   CBC with Differential   Result Value Ref Range    WBC 5.4 4.4 - 11.3 x10*3/uL    nRBC 0.0 0.0 - 0.0 /100 WBCs    RBC 3.04 (L) 4.50 - 5.90 x10*6/uL    Hemoglobin 8.4 (L) 13.5 - 17.5 g/dL    Hematocrit 23.7 (L) 41.0 - 52.0 %    MCV 78 (L) 80 - 100 fL    MCH 27.6 26.0 - 34.0 pg    MCHC 35.4 32.0 - 36.0 g/dL    RDW 12.8 11.5 - 14.5 %    Platelets 123 (L) 150 - 450 x10*3/uL    Neutrophils % 72.0 40.0 - 80.0 %    Immature Granulocytes %, Automated 0.4 0.0 - 0.9 %    Lymphocytes % 10.4 13.0 - 44.0 %    Monocytes % 11.2 2.0 - 10.0 %    Eosinophils % 5.4 0.0 - 6.0 %    Basophils % 0.6 0.0 - 2.0 %    Neutrophils Absolute 3.88 1.20 - 7.70 x10*3/uL    Immature Granulocytes Absolute, Automated 0.02 0.00 - 0.70 x10*3/uL    Lymphocytes Absolute 0.56 (L) 1.20 - 4.80 x10*3/uL    Monocytes Absolute 0.60 0.10 - 1.00 x10*3/uL    Eosinophils Absolute 0.29 0.00 - 0.70 " x10*3/uL    Basophils Absolute 0.03 0.00 - 0.10 x10*3/uL   Comprehensive Metabolic Panel   Result Value Ref Range    Glucose 162 (H) 74 - 99 mg/dL    Sodium 134 (L) 136 - 145 mmol/L    Potassium 3.7 3.5 - 5.3 mmol/L    Chloride 96 (L) 98 - 107 mmol/L    Bicarbonate 28 21 - 32 mmol/L    Anion Gap 14 10 - 20 mmol/L    Urea Nitrogen 12 6 - 23 mg/dL    Creatinine 3.09 (H) 0.50 - 1.30 mg/dL    eGFR 21 (L) >60 mL/min/1.73m*2    Calcium 9.1 8.6 - 10.6 mg/dL    Albumin 3.4 3.4 - 5.0 g/dL    Alkaline Phosphatase 68 33 - 136 U/L    Total Protein 6.9 6.4 - 8.2 g/dL    AST 14 9 - 39 U/L    Bilirubin, Total 0.5 0.0 - 1.2 mg/dL    ALT 8 (L) 10 - 52 U/L   BLOOD GAS VENOUS FULL PANEL   Result Value Ref Range    POCT pH, Venous 7.47 (H) 7.33 - 7.43 pH    POCT pCO2, Venous 42 41 - 51 mm Hg    POCT pO2, Venous 54 (H) 35 - 45 mm Hg    POCT SO2, Venous 87 (H) 45 - 75 %    POCT Oxy Hemoglobin, Venous 84.4 (H) 45.0 - 75.0 %    POCT Hematocrit Calculated, Venous 26.0 (L) 41.0 - 52.0 %    POCT Sodium, Venous 132 (L) 136 - 145 mmol/L    POCT Potassium, Venous 3.7 3.5 - 5.3 mmol/L    POCT Chloride, Venous 98 98 - 107 mmol/L    POCT Ionized Calicum, Venous 1.18 1.10 - 1.33 mmol/L    POCT Glucose, Venous 164 (H) 74 - 99 mg/dL    POCT Lactate, Venous 0.7 0.4 - 2.0 mmol/L    POCT Base Excess, Venous 6.3 (H) -2.0 - 3.0 mmol/L    POCT HCO3 Calculated, Venous 30.6 (H) 22.0 - 26.0 mmol/L    POCT Hemoglobin, Venous 8.7 (L) 13.5 - 17.5 g/dL    POCT Anion Gap, Venous 7.0 (L) 10.0 - 25.0 mmol/L    Patient Temperature 37.0 degrees Celsius    FiO2 21 %   Blood Culture    Specimen: Peripheral Venipuncture; Blood culture   Result Value Ref Range    Blood Culture Loaded on Instrument - Culture in progress    HIV 1/2 Antigen/Antibody Screen with Reflex to Confirmation   Result Value Ref Range    HIV 1/2 Antigen/Antibody Screen with Reflex to Confirmation Nonreactive Nonreactive   Iron and TIBC   Result Value Ref Range    Iron 13 (L) 35 - 150 ug/dL    UIBC 130  110 - 370 ug/dL    TIBC 143 (L) 240 - 445 ug/dL    % Saturation 9 (L) 25 - 45 %   Ferritin   Result Value Ref Range    Ferritin 985 (H) 20 - 300 ng/mL   ECG 12 lead   Result Value Ref Range    Ventricular Rate 79 BPM    Atrial Rate 79 BPM    AZ Interval 166 ms    QRS Duration 144 ms    QT Interval 408 ms    QTC Calculation(Bazett) 467 ms    P Axis 64 degrees    R Axis 84 degrees    T Axis 53 degrees    QRS Count 12 beats    Q Onset 216 ms    P Onset 133 ms    P Offset 193 ms    T Offset 420 ms    QTC Fredericia 447 ms   Sars-CoV-2 PCR   Result Value Ref Range    Coronavirus 2019, PCR Not Detected Not Detected   Influenza A, and B PCR   Result Value Ref Range    Flu A Result Not Detected Not Detected    Flu B Result Not Detected Not Detected   Blood Culture    Specimen: Peripheral Venipuncture; Blood culture   Result Value Ref Range    Blood Culture Loaded on Instrument - Culture in progress    C. difficile, PCR    Specimen: Stool   Result Value Ref Range    C. difficile, PCR Detected (A) Not Detected   Clostridium Difficile EIA    Specimen: Stool   Result Value Ref Range    C. difficile (Toxin A/B) Negative Negative, Indeterminate   POCT GLUCOSE   Result Value Ref Range    POCT Glucose 190 (H) 74 - 99 mg/dL   POCT GLUCOSE   Result Value Ref Range    POCT Glucose 134 (H) 74 - 99 mg/dL   Urinalysis with Reflex Microscopic   Result Value Ref Range    Color, Urine Light-Yellow Light-Yellow, Yellow, Dark-Yellow    Appearance, Urine Clear Clear    Specific Gravity, Urine 1.013 1.005 - 1.035    pH, Urine 8.5 (N) 5.0, 5.5, 6.0, 6.5, 7.0, 7.5, 8.0    Protein, Urine 300 (3+) (A) NEGATIVE, 10 (TRACE), 20 (TRACE) mg/dL    Glucose, Urine Normal Normal mg/dL    Blood, Urine NEGATIVE NEGATIVE    Ketones, Urine NEGATIVE NEGATIVE mg/dL    Bilirubin, Urine NEGATIVE NEGATIVE    Urobilinogen, Urine Normal Normal mg/dL    Nitrite, Urine NEGATIVE NEGATIVE    Leukocyte Esterase, Urine NEGATIVE NEGATIVE   Legionella Antigen, Urine     Specimen: Urine   Result Value Ref Range    L. pneumophila Urine Ag Negative Negative   Streptococcus pneumoniae Antigen, Urine    Specimen: Urine   Result Value Ref Range    Streptococcus pneumoniae Ag, Urine Negative Negative   Microscopic Only, Urine   Result Value Ref Range    WBC, Urine 1-5 1-5, NONE /HPF    RBC, Urine 1-2 NONE, 1-2, 3-5 /HPF    Bacteria, Urine 1+ (A) NONE SEEN /HPF     *Note: Due to a large number of results and/or encounters for the requested time period, some results have not been displayed. A complete set of results can be found in Results Review.        ASSESSMENT AND PLAN:  Mr. Bashir is a 69 y.o. male who underwent a kidney transplant surgery on [unfilled] and was hospitalized for:    Principal Problem:    Pneumonia symptoms      ESRD on chronic hemodialysis using  LUE AVG:  -Reviewed lab today.  Lab Results   Component Value Date    GLUCOSE 162 (H) 08/24/2024    CALCIUM 9.1 08/24/2024     (L) 08/24/2024    K 3.7 08/24/2024    CO2 28 08/24/2024    CL 96 (L) 08/24/2024    BUN 12 08/24/2024    CREATININE 3.09 (H) 08/24/2024     - HD yesterday.   -see HD order in Order Tab  -Patient was seen during dialysis.  - No complications during the dialysis procedure  -Dialysis access is working well without issues  -Continue HD on T-Th-Sat    2. Hypertension  Blood Pressures         8/24/2024  1821 8/24/2024  2049 8/25/2024  0009 8/25/2024  0400 8/25/2024  0718    BP: 174/79 193/81 180/79 197/86 188/84          -continue current management  -Consider increasing nifedipine er if BP is persistently high.     3. Anemia  Lab Results   Component Value Date    WBC 5.4 08/24/2024    HGB 8.4 (L) 08/24/2024    HCT 23.7 (L) 08/24/2024    MCV 78 (L) 08/24/2024     (L) 08/24/2024     -No indications for blood transfusion  -DARIANA @HD  - Recheck iron studies, ferritin    4. BMD/secondary hyperparathyroidism  Lab Results   Component Value Date    .6 (H) 05/01/2024    CALCIUM 9.1 08/24/2024    PHOS  2.9 07/10/2024    VITD25 46 05/01/2024     -Defer to outpatient management    5. Immunosuppression   - stop tacrolimus  - continue prednisone 5 mg daily    6. Pneumonia/Diarrhea  -defer to primary team    * Case was discussed with primary team.      Dialysis will be managed by gen neph service. I have notified oncall neph fellow today.     Transplant nephrology team will sign off.      Thank you for consultation.      Marion Bridges    Transplant Nephrologist

## 2024-08-25 NOTE — CARE PLAN
Problem: Fall/Injury  Goal: Not fall by end of shift  Outcome: Progressing  Goal: Be free from injury by end of the shift  Outcome: Progressing  Goal: Verbalize understanding of personal risk factors for fall in the hospital  Outcome: Progressing  Goal: Verbalize understanding of risk factor reduction measures to prevent injury from fall in the home  Outcome: Progressing  Goal: Use assistive devices by end of the shift  Outcome: Progressing  Goal: Pace activities to prevent fatigue by end of the shift  Outcome: Progressing     Problem: Pain - Adult  Goal: Verbalizes/displays adequate comfort level or baseline comfort level  Outcome: Progressing   The patient's goals for the shift include Patient will tolerate diet with no c/o of N/V Diarrhea this shift    The clinical goals for the shift include Patient will have no c/o of Pain or rate Pian <4 this shift    Over the shift, the patient did not make progress toward the following goals.

## 2024-08-25 NOTE — PROGRESS NOTES
Internal Medicine Daily Progress Note    Subjective   Patient is a 69 y.o. male on day 1 of admission presenting with Pneumonia symptoms.    Interval events:  Patient seen and examined.    Had 2 epi of vomiting yesterday evening, no diarrhea. C/o generalized body pain, severe in the lower back, and also headaches. Patient has chronic back pain and has been on oxycontin 2.5mg at home.      Objective     Vitals:  Visit Vitals  BP (!) 188/84   Pulse 83   Temp 36.6 °C (97.9 °F) (Temporal)   Resp 16         Intake/Output Summary (Last 24 hours) at 8/25/2024 1043  Last data filed at 8/25/2024 0934  Gross per 24 hour   Intake 240 ml   Output 126 ml   Net 114 ml       Physical exam:  Physical Exam  Constitutional:       Appearance: He is not ill-appearing.   Eyes:      Conjunctiva/sclera:      Left eye: Left conjunctiva is injected.   Cardiovascular:      Rate and Rhythm: Normal rate and regular rhythm.      Pulses: Normal pulses.   Pulmonary:      Effort: Pulmonary effort is normal.      Breath sounds: Rhonchi present.   Abdominal:      Palpations: Abdomen is soft.      Tenderness: There is no abdominal tenderness.   Neurological:      General: No focal deficit present.      Mental Status: He is alert and oriented to person, place, and time.         Medications:  aspirin, 81 mg, oral, Daily  azithromycin, 500 mg, oral, q24h ZOË  B complex-vitamin C-folic acid, 1 capsule, oral, Daily  [Held by provider] bumetanide, 2 mg, oral, Once per day on Sunday Tuesday Thursday Saturday  calcitriol, 0.5 mcg, oral, Daily  carvedilol, 37.5 mg, oral, BID  cinacalcet, 30 mg, oral, Daily  erythromycin ethylsuccinate, 200 mg, oral, TID AC  heparin (porcine), 5,000 Units, subcutaneous, q8h  hydrALAZINE, 100 mg, oral, q8h  insulin glargine, 4 Units, subcutaneous, Nightly  insulin lispro, 0-10 Units, subcutaneous, TID  NIFEdipine ER, 60 mg, oral, BID  ondansetron, 4 mg, intravenous, Once  pantoprazole, 40 mg, oral, Daily before  breakfast  piperacillin-tazobactam, 3.375 g, intravenous, q6h  pravastatin, 10 mg, oral, Nightly  predniSONE, 5 mg, oral, q AM  vancomycin, 125 mg, oral, 4x daily         PRN medications: acetaminophen, dextrose, dextrose, glucagon, glucagon, oxyCODONE    Scheduled Medications:  PRN Medications:    aspirin, 81 mg, oral, Daily  azithromycin, 500 mg, oral, q24h ZOË  B complex-vitamin C-folic acid, 1 capsule, oral, Daily  [Held by provider] bumetanide, 2 mg, oral, Once per day on Sunday Tuesday Thursday Saturday  calcitriol, 0.5 mcg, oral, Daily  carvedilol, 37.5 mg, oral, BID  cinacalcet, 30 mg, oral, Daily  erythromycin ethylsuccinate, 200 mg, oral, TID AC  heparin (porcine), 5,000 Units, subcutaneous, q8h  hydrALAZINE, 100 mg, oral, q8h  insulin glargine, 4 Units, subcutaneous, Nightly  insulin lispro, 0-10 Units, subcutaneous, TID  NIFEdipine ER, 60 mg, oral, BID  ondansetron, 4 mg, intravenous, Once  pantoprazole, 40 mg, oral, Daily before breakfast  piperacillin-tazobactam, 3.375 g, intravenous, q6h  pravastatin, 10 mg, oral, Nightly  predniSONE, 5 mg, oral, q AM  vancomycin, 125 mg, oral, 4x daily     PRN medications: acetaminophen, dextrose, dextrose, glucagon, glucagon, oxyCODONE       Labs:    CBC:  Results from last 7 days   Lab Units 08/25/24  0910 08/24/24  1221   WBC AUTO x10*3/uL 7.9 5.4   HEMOGLOBIN g/dL 9.3* 8.4*   PLATELETS AUTO x10*3/uL 148* 123*     BMP:  Results from last 7 days   Lab Units 08/24/24  1221   SODIUM mmol/L 134*   POTASSIUM mmol/L 3.7   CHLORIDE mmol/L 96*   CO2 mmol/L 28   BUN mg/dL 12   CREATININE mg/dL 3.09*   GLUCOSE mg/dL 162*     LFT:  Results from last 7 days   Lab Units 08/24/24  1221   AST U/L 14   ALT U/L 8*   ALK PHOS U/L 68   BILIRUBIN TOTAL mg/dL 0.5     Cardiac:      Coag:      ABG:     .  UA:   Results from last 7 days   Lab Units 08/25/24  0412   COLOR U  Light-Yellow   PH U  8.5*   SPEC GRAV UR  1.013   PROTEIN U mg/dL 300 (3+)*   BLOOD UR  NEGATIVE   NITRITE U   NEGATIVE   WBC UR /HPF 1-5   BACTERIA UR /HPF 1+*     Last EKG  Encounter Date: 08/24/24   ECG 12 lead   Result Value    Ventricular Rate 79    Atrial Rate 79    LA Interval 166    QRS Duration 144    QT Interval 408    QTC Calculation(Bazett) 467    P Axis 64    R Axis 84    T Axis 53    QRS Count 12    Q Onset 216    P Onset 133    P Offset 193    T Offset 420    QTC Fredericia 447    Narrative    Normal sinus rhythm  Nonspecific intraventricular block  Minimal voltage criteria for LVH, may be normal variant ( Roach product )  Abnormal ECG  When compared with ECG of 26-JUN-2024 08:26,  No significant change was found    See ED provider note for full interpretation and clinical correlation  Confirmed by Faustina Workman ([id number]) on 8/24/2024 9:19:13 PM        Imaging:  CT abdomen pelvis wo IV contrast  Result Date: 8/24/2024  1.  Bilateral pleural effusions right-greater-than-left with the ground-glass opacities in the bilateral lower lobes. Findings can be seen with pulmonary edema.. 2. No acute pathology within the abdomen or pelvis to explain right-sided abdominal pain.       XR chest 1 view  Result Date: 8/24/2024  Sizable area of right basilar consolidation consistent with pneumonia. There is increased generalized prominence of the perihilar and basilar interstitium and a superimposed component of edema suggested.       Assessment and Plan:  Manolo Bashir is a 69 y.o. male with PMHx of ESRD (s/p renal transplant x2), prostate cancer (s/p radical prostatectomy), T2DM, MGUS, HTN who presented with features suggestive of pneumonia and gastroparesis.     Updates 08/25/24:  - UA with reflex not significant except of protenuria and 1+ bacteria.  - C.diff PCR positive but EIA negative - likely colonization.  - DC Cefepime d/t increased risk for neurotoxicity iso ESRD.   - Commence on PO Vanc and IV Zosyn.  - Thoracentesis tomorrow.  - Hold Reglan; commence on Erythromycin for gastroparesis.    #Pneumonia  :: Has had  multiple hospital admissions for pneumonia.  :: CXR 08/24 demonstrated right basilar consolidation; CT AP w/o contrast shows bilateral plural effusion and right basilar consolidation.  :: Started on cefepime and azithromycin in the ED; now on PO Vanc. and IV Zosyn.  :: Urine Strept & Legionella both -ve.  - F/up Procal, blood culture.  - DC Cefepime d/t increased risk for neurotoxicity iso ESRD.  - C/w Azithromycin for 3days total.     #Diarrhea  :: Chronic, waxes and wanes; dates back to AUS placement in Feb. 2023 that got infected.  :: H/o right hemicolectomy (2017).  :: Has been controlled with Loperamide.  :: Colonoscopy on 8/2023 - No polyps found. Repeat surveillance in 7 yrs (2030).   - Hold Loperamide.  - F/up on stool pathogen panel ordered in the ED.      #Nausea & Vomiting  :: Has gastroparesis; confirmed with [facility] 04/24.  :: Has been on Reglan 5mg bid (stopped recently), ProtoNix 40mg dly.  - Hold Reglan.  - C/w ProtoNix 40mg dly.  - Now on Erythromycin susp. 200mg tid     #ESRD s/p renal transplant (X2)  :: Renal transplants in 1992 and 2013;  :: Now with impaired allograft function currently on immunosuppression (on Tacrolimus and Prednisone).  :: Baseline Cr 2.5 - 3  :: On [facility]ThS iHD schedule; last session (08/24/24)  :: Transplant nephrology has been following and are aware of the current admission.  - Hold Bumex and Tacrolimus.  - C/w Prednisone.  - Strict I&Os     #T2DM  :: Last A1C 6.6 (7/10/24).  :: Home regimen: insulin glargine 20 units daily and SSI.  ::  on presentation, patient is not eating as usual.   - Continue basal Lantus at 4 units, will increase dose based on the AM BG.   - Continue on SSI #2.     #Chronic HTN  :: H/o multiple prior admissions for hypervolemia and hypertensive urgency.   :: Has been on (Hydralazine 100 mg TID, carvedilol 37.5 mg, BID, Nifedipine 60 mg daily, Bumex 2 mg QID on non iHD days).  :: /79 on presentation  - Continue home Hydralazine, Coreg,  Nifedipine and Imdur.  - Hold Bumex.    #Chronic lower back pain  - Encourage patient to be out of bed to chair 3 times daily with meals.  - Continue oxycodone.    ---------------------------------------------------------------------------  Fluids: PRN  Electrolytes: PRN  Nutrition: Adult diet Renal; Potassium Restricted 2 gm (50mEq); 2 - 3 grams Sodium  Access: PIV    DVT prophylaxis: None  GI Ppx: Pantoprazole     Abx: Cefepime, Azithromycin  O2: RA    Pain regimen: Oxy  GI Laxative: None    Code Status: Full Code (Confirmed on admission)   Emergency Contact / NOK: Extended Emergency Contact Information  Primary Emergency Contact: KETTY PLASENCIA  Address: [address] [place] [address] [facility]  Home Phone: [phone]  Work Phone: [phone]  Mobile Phone: [phone]  Relation: Friend  Preferred language: English  Secondary Emergency Contact: CLEMENTE ORB  Address: [address] [place] [address] [facility]  Home Phone: [phone]  Mobile Phone: [phone]  Relation: Daughter     ----------------------------------------------------------------------------    Jose Francisco Oh MD  PGY-1 Internal Medicine Resident

## 2024-08-25 NOTE — PROGRESS NOTES
Pharmacy Medication History Review    Manolo Bashir is a 69 y.o. male admitted for Pneumonia symptoms. Pharmacy reviewed the patient's [id number] medications and allergies for accuracy.    The list below reflects the updated PTA list.   Prior to Admission Medications   Prescriptions Last Dose Informant   B complex-vitamin C-folic acid (Nephrocaps) 1 mg capsule 2024    Sig: Take 1 capsule by mouth once daily.   Easy Touch Alcohol Prep Pads pads, medicated  Friend   NIFEdipine ER (Adalat CC) 60 mg 24 hr tablet 2024    Sig: Take 1 tablet (60 mg) by mouth 2 times a day. Do not crush, chew, or split.   Unilet Super Thin Lancets 30 gauge misc  Friend   Si each 3 times a day.   acetaminophen (Tylenol) 325 mg tablet 2024 Friend   Sig: Take 3 tablets (975 mg) by mouth every 6 hours if needed (pain).   aspirin 81 mg chewable tablet 2024 Friend   Sig: Chew 1 tablet (81 mg) once daily.   bumetanide (Bumex) 2 mg tablet     Sig: Take 1 tablet (2 mg) by mouth 4 times a week. Take bumetanide 2mg once daily on non-dialysis days (, Monday, Wednesday, Friday)   calcitriol (Rocaltrol) 0.5 mcg capsule 2024    Sig: Take 1 capsule (0.5 mcg) by mouth once daily.   carvedilol (Coreg) 12.5 mg tablet 2024 Friend   Sig: TAKE THREE (3) TABLETS BY MOUTH TWICE DAILY   cinacalcet (Sensipar) 30 mg tablet 2024 Friend   Sig: Take 1 tablet (30 mg) by mouth once daily.   epoetin aida 10,000 unit/mL injection More than a month Friend   Sig: Inject 1 mL (10,000 Units) under the skin every 7 days. Do not start before 2024.   Patient not taking: Reported on 2024   glucagon (Gvoke HypoPen 1-Pack) 1 mg/0.2 mL auto-injector Unknown    Sig: Inject 1 mg into the muscle every 15 minutes if needed (For blood glucose 41 to 70 mg/dL and no IV access).   hydrALAZINE (Apresoline) 100 mg tablet Not Taking Friend   Sig: Take 1 tablet (100 mg) by mouth every 8 hours.   insulin glargine (Lantus) 100  "unit/mL (3 mL) pen Past Week    Sig: Inject 20 units daily as directed           insulin lispro (HumaLOG) 100 unit/mL injection Past Week Friend   Sig: Inject under the skin 3 times a day with meals. 100-199 2 units 200-299 4 units 300-399 6 units   isosorbide mononitrate ER (Imdur) 60 mg 24 hr tablet 8/23/2024    Sig: Take 1 tablet (60 mg) by mouth once daily.   lancets (Unilet Super Thin Lancets) 30 gauge misc  Friend   Sig: USE TO TEST BLOOD SUGAR THREE TIMES A DAY   loperamide (Imodium) 2 mg capsule 8/23/2024    Sig: Take 1 capsule (2 mg) by mouth 4 times a day as needed for diarrhea.   metoclopramide (Reglan) 5 mg tablet More than a month Friend   Sig: Take 1 tablet (5 mg) by mouth 2 times a day. 1 tablet before dinner and 1 tablet at bedtime.   Patient not taking: Reported on 8/24/2024   pantoprazole (ProtoNix) 40 mg EC tablet 8/23/2024 Friend   Sig: Take 1 tablet (40 mg) by mouth once daily in the morning. Take before meals. Do not crush, chew, or split. Do not start before January 22, 2024.   pen needle, diabetic (TechLITE Pen Needle) 32 gauge x 5/32\" needle     Sig: Use 4 a day as directed   pravastatin (Pravachol) 10 mg tablet 8/23/2024    Sig: Take 1 tablet (10 mg) by mouth once daily at bedtime.   predniSONE (Deltasone) 5 mg tablet 8/23/2024    Sig: Take 1 tablet (5 mg) by mouth once daily in the morning. Do not start before January 22, 2024.   syringe with needle 3 mL 25 x 5/8\" syringe  Friend   Sig: Use to inject epogen.   tacrolimus (Prograf) 1 mg capsule 8/23/2024    Sig: Take 1 capsule (1 mg) by mouth 2 times a day.   tacrolimus (Protopic) 0.1 % ointment More than a month Friend   Sig: Apply topically 2 times a day.   tacrolimus (Protopic) 0.1 % ointment More than a month    Sig: Apply topically 2 times a day.      Facility-Administered Medications: None        The list below reflects the updated allergy list. Please review each documented allergy for additional clarification and " justification.  Allergies  Reviewed by Emma Gaviria, RN on 8/24/2024   No Known Allergies         Patient accepts M2B at discharge. Pharmacy has been updated to [facility].    Sources used to complete the med history include: Friend interview - fair historian of medications, helps manage meds for patient/ Pharmacy - [facility], [facility]/ Chart review - Transplant visit 8/9/24    Deb Willard PharmD  Transitions of Care Pharmacist  [facility]  Please reach out via Secure Chat for questions, or if no response call [employer] or [facility]

## 2024-08-26 ENCOUNTER — APPOINTMENT (OUTPATIENT)
Dept: CARDIOLOGY | Facility: HOSPITAL | Age: 69
End: 2024-08-26
Payer: COMMERCIAL

## 2024-08-26 ENCOUNTER — APPOINTMENT (OUTPATIENT)
Dept: RADIOLOGY | Facility: HOSPITAL | Age: 69
End: 2024-08-26
Payer: COMMERCIAL

## 2024-08-26 ENCOUNTER — APPOINTMENT (OUTPATIENT)
Dept: DIALYSIS | Facility: HOSPITAL | Age: 69
End: 2024-08-26
Payer: COMMERCIAL

## 2024-08-26 LAB
ALBUMIN SERPL BCP-MCNC: 2.9 G/DL (ref 3.4–5)
ALBUMIN SERPL BCP-MCNC: 3.3 G/DL (ref 3.4–5)
ALP SERPL-CCNC: 68 U/L (ref 33–136)
ALT SERPL W P-5'-P-CCNC: 10 U/L (ref 10–52)
AMMONIA PLAS-SCNC: 26 UMOL/L (ref 16–53)
ANION GAP BLDV CALCULATED.4IONS-SCNC: 11 MMOL/L (ref 10–25)
ANION GAP SERPL CALC-SCNC: 14 MMOL/L (ref 10–20)
ANION GAP SERPL CALC-SCNC: 16 MMOL/L (ref 10–20)
ANION GAP SERPL CALC-SCNC: 22 MMOL/L (ref 10–20)
AST SERPL W P-5'-P-CCNC: 22 U/L (ref 9–39)
BASE EXCESS BLDV CALC-SCNC: -1.4 MMOL/L (ref -2–3)
BASOPHILS # BLD AUTO: 0.02 X10*3/UL (ref 0–0.1)
BASOPHILS # BLD AUTO: 0.02 X10*3/UL (ref 0–0.1)
BASOPHILS NFR BLD AUTO: 0.5 %
BASOPHILS NFR BLD AUTO: 0.5 %
BILIRUB SERPL-MCNC: 0.6 MG/DL (ref 0–1.2)
BODY TEMPERATURE: 37 DEGREES CELSIUS
BUN SERPL-MCNC: 19 MG/DL (ref 6–23)
BUN SERPL-MCNC: 33 MG/DL (ref 6–23)
BUN SERPL-MCNC: 35 MG/DL (ref 6–23)
C COLI+JEJ+UPSA DNA STL QL NAA+PROBE: NOT DETECTED
CA-I BLDV-SCNC: 1.25 MMOL/L (ref 1.1–1.33)
CALCIUM SERPL-MCNC: 8.4 MG/DL (ref 8.6–10.6)
CALCIUM SERPL-MCNC: 8.9 MG/DL (ref 8.6–10.6)
CALCIUM SERPL-MCNC: 9.6 MG/DL (ref 8.6–10.6)
CHLORIDE BLDV-SCNC: 95 MMOL/L (ref 98–107)
CHLORIDE SERPL-SCNC: 93 MMOL/L (ref 98–107)
CHLORIDE SERPL-SCNC: 94 MMOL/L (ref 98–107)
CHLORIDE SERPL-SCNC: 97 MMOL/L (ref 98–107)
CLARITY FLD: ABNORMAL
CO2 SERPL-SCNC: 19 MMOL/L (ref 21–32)
CO2 SERPL-SCNC: 25 MMOL/L (ref 21–32)
CO2 SERPL-SCNC: 25 MMOL/L (ref 21–32)
COLOR FLD: ABNORMAL
CREAT SERPL-MCNC: 4.1 MG/DL (ref 0.5–1.3)
CREAT SERPL-MCNC: 6.53 MG/DL (ref 0.5–1.3)
CREAT SERPL-MCNC: 7.2 MG/DL (ref 0.5–1.3)
EC STX1 GENE STL QL NAA+PROBE: NOT DETECTED
EC STX2 GENE STL QL NAA+PROBE: NOT DETECTED
EGFRCR SERPLBLD CKD-EPI 2021: 15 ML/MIN/1.73M*2
EGFRCR SERPLBLD CKD-EPI 2021: 8 ML/MIN/1.73M*2
EGFRCR SERPLBLD CKD-EPI 2021: 9 ML/MIN/1.73M*2
EOSINOPHIL # BLD AUTO: 0.05 X10*3/UL (ref 0–0.7)
EOSINOPHIL # BLD AUTO: 0.18 X10*3/UL (ref 0–0.7)
EOSINOPHIL NFR BLD AUTO: 1.3 %
EOSINOPHIL NFR BLD AUTO: 4.1 %
ERYTHROCYTE [DISTWIDTH] IN BLOOD BY AUTOMATED COUNT: 12.8 % (ref 11.5–14.5)
ERYTHROCYTE [DISTWIDTH] IN BLOOD BY AUTOMATED COUNT: 12.9 % (ref 11.5–14.5)
GLUCOSE BLD MANUAL STRIP-MCNC: 150 MG/DL (ref 74–99)
GLUCOSE BLD MANUAL STRIP-MCNC: 205 MG/DL (ref 74–99)
GLUCOSE BLD MANUAL STRIP-MCNC: 207 MG/DL (ref 74–99)
GLUCOSE BLD MANUAL STRIP-MCNC: 221 MG/DL (ref 74–99)
GLUCOSE BLD MANUAL STRIP-MCNC: 261 MG/DL (ref 74–99)
GLUCOSE BLDV-MCNC: 296 MG/DL (ref 74–99)
GLUCOSE SERPL-MCNC: 182 MG/DL (ref 74–99)
GLUCOSE SERPL-MCNC: 269 MG/DL (ref 74–99)
GLUCOSE SERPL-MCNC: 51 MG/DL (ref 74–99)
HCO3 BLDV-SCNC: 24.7 MMOL/L (ref 22–26)
HCT VFR BLD AUTO: 22 % (ref 41–52)
HCT VFR BLD AUTO: 22.5 % (ref 41–52)
HCT VFR BLD EST: 23 % (ref 41–52)
HGB BLD-MCNC: 7.1 G/DL (ref 13.5–17.5)
HGB BLD-MCNC: 7.7 G/DL (ref 13.5–17.5)
HGB BLDV-MCNC: 7.8 G/DL (ref 13.5–17.5)
IMM GRANULOCYTES # BLD AUTO: 0.01 X10*3/UL (ref 0–0.7)
IMM GRANULOCYTES # BLD AUTO: 0.01 X10*3/UL (ref 0–0.7)
IMM GRANULOCYTES NFR BLD AUTO: 0.2 % (ref 0–0.9)
IMM GRANULOCYTES NFR BLD AUTO: 0.3 % (ref 0–0.9)
INHALED O2 CONCENTRATION: 28 %
LACTATE BLDV-SCNC: 0.9 MMOL/L (ref 0.4–2)
LACTATE SERPL-SCNC: 0.7 MMOL/L (ref 0.4–2)
LDH FLD L TO P-CCNC: 74 U/L
LYMPHOCYTES # BLD AUTO: 0.43 X10*3/UL (ref 1.2–4.8)
LYMPHOCYTES # BLD AUTO: 0.96 X10*3/UL (ref 1.2–4.8)
LYMPHOCYTES NFR BLD AUTO: 10.9 %
LYMPHOCYTES NFR BLD AUTO: 21.7 %
MAGNESIUM SERPL-MCNC: 1.68 MG/DL (ref 1.6–2.4)
MCH RBC QN AUTO: 26.9 PG (ref 26–34)
MCH RBC QN AUTO: 27.1 PG (ref 26–34)
MCHC RBC AUTO-ENTMCNC: 32.3 G/DL (ref 32–36)
MCHC RBC AUTO-ENTMCNC: 34.2 G/DL (ref 32–36)
MCV RBC AUTO: 79 FL (ref 80–100)
MCV RBC AUTO: 84 FL (ref 80–100)
MONOCYTES # BLD AUTO: 0.42 X10*3/UL (ref 0.1–1)
MONOCYTES # BLD AUTO: 0.63 X10*3/UL (ref 0.1–1)
MONOCYTES NFR BLD AUTO: 10.6 %
MONOCYTES NFR BLD AUTO: 14.2 %
NEUTROPHILS # BLD AUTO: 2.63 X10*3/UL (ref 1.2–7.7)
NEUTROPHILS # BLD AUTO: 3.02 X10*3/UL (ref 1.2–7.7)
NEUTROPHILS NFR BLD AUTO: 59.3 %
NEUTROPHILS NFR BLD AUTO: 76.4 %
NOROVIRUS GI + GII RNA STL NAA+PROBE: NOT DETECTED
NRBC BLD-RTO: 0 /100 WBCS (ref 0–0)
NRBC BLD-RTO: 0 /100 WBCS (ref 0–0)
OSMOLALITY SERPL: 274 MOSM/KG (ref 280–300)
OXYHGB MFR BLDV: 79.7 % (ref 45–75)
PCO2 BLDV: 48 MM HG (ref 41–51)
PH BLDV: 7.32 PH (ref 7.33–7.43)
PHOSPHATE SERPL-MCNC: 4.5 MG/DL (ref 2.5–4.9)
PLATELET # BLD AUTO: 129 X10*3/UL (ref 150–450)
PLATELET # BLD AUTO: 92 X10*3/UL (ref 150–450)
PO2 BLDV: 55 MM HG (ref 35–45)
POTASSIUM BLDV-SCNC: 5 MMOL/L (ref 3.5–5.3)
POTASSIUM SERPL-SCNC: 3.8 MMOL/L (ref 3.5–5.3)
POTASSIUM SERPL-SCNC: 4.5 MMOL/L (ref 3.5–5.3)
POTASSIUM SERPL-SCNC: 4.8 MMOL/L (ref 3.5–5.3)
PROT FLD-MCNC: 2.4 G/DL
PROT SERPL-MCNC: 7 G/DL (ref 6.4–8.2)
RBC # BLD AUTO: 2.62 X10*6/UL (ref 4.5–5.9)
RBC # BLD AUTO: 2.86 X10*6/UL (ref 4.5–5.9)
RBC # FLD AUTO: ABNORMAL /UL
RV RNA STL NAA+PROBE: NOT DETECTED
SALMONELLA DNA STL QL NAA+PROBE: NOT DETECTED
SAO2 % BLDV: 82 % (ref 45–75)
SHIGELLA DNA SPEC QL NAA+PROBE: NOT DETECTED
SODIUM BLDV-SCNC: 126 MMOL/L (ref 136–145)
SODIUM SERPL-SCNC: 128 MMOL/L (ref 136–145)
SODIUM SERPL-SCNC: 129 MMOL/L (ref 136–145)
SODIUM SERPL-SCNC: 134 MMOL/L (ref 136–145)
V CHOLERAE DNA STL QL NAA+PROBE: NOT DETECTED
WBC # BLD AUTO: 4 X10*3/UL (ref 4.4–11.3)
WBC # BLD AUTO: 4.4 X10*3/UL (ref 4.4–11.3)
WBC # FLD AUTO: 1247 /UL
Y ENTEROCOL DNA STL QL NAA+PROBE: NOT DETECTED

## 2024-08-26 PROCEDURE — 83615 LACTATE (LD) (LDH) ENZYME: CPT

## 2024-08-26 PROCEDURE — 32555 ASPIRATE PLEURA W/ IMAGING: CPT

## 2024-08-26 PROCEDURE — 82140 ASSAY OF AMMONIA: CPT

## 2024-08-26 PROCEDURE — 93010 ELECTROCARDIOGRAM REPORT: CPT | Performed by: INTERNAL MEDICINE

## 2024-08-26 PROCEDURE — 85025 COMPLETE CBC W/AUTO DIFF WBC: CPT

## 2024-08-26 PROCEDURE — 36415 COLL VENOUS BLD VENIPUNCTURE: CPT

## 2024-08-26 PROCEDURE — 0W993ZZ DRAINAGE OF RIGHT PLEURAL CAVITY, PERCUTANEOUS APPROACH: ICD-10-PCS | Performed by: PHYSICIAN ASSISTANT

## 2024-08-26 PROCEDURE — 84132 ASSAY OF SERUM POTASSIUM: CPT

## 2024-08-26 PROCEDURE — 2500000004 HC RX 250 GENERAL PHARMACY W/ HCPCS (ALT 636 FOR OP/ED): Performed by: INTERNAL MEDICINE

## 2024-08-26 PROCEDURE — 80069 RENAL FUNCTION PANEL: CPT

## 2024-08-26 PROCEDURE — 84157 ASSAY OF PROTEIN OTHER: CPT

## 2024-08-26 PROCEDURE — 1100000001 HC PRIVATE ROOM DAILY

## 2024-08-26 PROCEDURE — 2500000002 HC RX 250 W HCPCS SELF ADMINISTERED DRUGS (ALT 637 FOR MEDICARE OP, ALT 636 FOR OP/ED)

## 2024-08-26 PROCEDURE — 2500000001 HC RX 250 WO HCPCS SELF ADMINISTERED DRUGS (ALT 637 FOR MEDICARE OP)

## 2024-08-26 PROCEDURE — 83605 ASSAY OF LACTIC ACID: CPT

## 2024-08-26 PROCEDURE — 99232 SBSQ HOSP IP/OBS MODERATE 35: CPT | Performed by: INTERNAL MEDICINE

## 2024-08-26 PROCEDURE — 87205 SMEAR GRAM STAIN: CPT

## 2024-08-26 PROCEDURE — 2500000004 HC RX 250 GENERAL PHARMACY W/ HCPCS (ALT 636 FOR OP/ED)

## 2024-08-26 PROCEDURE — 83735 ASSAY OF MAGNESIUM: CPT

## 2024-08-26 PROCEDURE — 93005 ELECTROCARDIOGRAM TRACING: CPT

## 2024-08-26 PROCEDURE — 2500000005 HC RX 250 GENERAL PHARMACY W/O HCPCS

## 2024-08-26 PROCEDURE — 99233 SBSQ HOSP IP/OBS HIGH 50: CPT

## 2024-08-26 PROCEDURE — 71045 X-RAY EXAM CHEST 1 VIEW: CPT

## 2024-08-26 PROCEDURE — 2500000005 HC RX 250 GENERAL PHARMACY W/O HCPCS: Performed by: INTERNAL MEDICINE

## 2024-08-26 PROCEDURE — 82947 ASSAY GLUCOSE BLOOD QUANT: CPT

## 2024-08-26 PROCEDURE — 89050 BODY FLUID CELL COUNT: CPT

## 2024-08-26 PROCEDURE — 97161 PT EVAL LOW COMPLEX 20 MIN: CPT | Mod: GP | Performed by: PHYSICAL THERAPIST

## 2024-08-26 RX ORDER — MAGNESIUM SULFATE HEPTAHYDRATE 40 MG/ML
2 INJECTION, SOLUTION INTRAVENOUS ONCE
Status: COMPLETED | OUTPATIENT
Start: 2024-08-26 | End: 2024-08-26

## 2024-08-26 RX ORDER — VANCOMYCIN HYDROCHLORIDE 750 MG/150ML
750 INJECTION, SOLUTION INTRAVENOUS
Status: DISCONTINUED | OUTPATIENT
Start: 2024-08-27 | End: 2024-08-28

## 2024-08-26 RX ORDER — VANCOMYCIN HYDROCHLORIDE 1 G/20ML
INJECTION, POWDER, LYOPHILIZED, FOR SOLUTION INTRAVENOUS DAILY PRN
Status: DISCONTINUED | OUTPATIENT
Start: 2024-08-26 | End: 2024-08-28

## 2024-08-26 RX ADMIN — AZITHROMYCIN DIHYDRATE 500 MG: 500 TABLET ORAL at 08:25

## 2024-08-26 RX ADMIN — PIPERACILLIN SODIUM AND TAZOBACTAM SODIUM 3.38 G: 3; .375 INJECTION, SOLUTION INTRAVENOUS at 14:59

## 2024-08-26 RX ADMIN — PIPERACILLIN SODIUM AND TAZOBACTAM SODIUM 3.38 G: 3; .375 INJECTION, SOLUTION INTRAVENOUS at 03:20

## 2024-08-26 RX ADMIN — VANCOMYCIN HYDROCHLORIDE 125 MG: 125 CAPSULE ORAL at 17:37

## 2024-08-26 RX ADMIN — MAGNESIUM SULFATE HEPTAHYDRATE 2 G: 40 INJECTION, SOLUTION INTRAVENOUS at 10:27

## 2024-08-26 RX ADMIN — PIPERACILLIN SODIUM AND TAZOBACTAM SODIUM 3.38 G: 3; .375 INJECTION, SOLUTION INTRAVENOUS at 10:27

## 2024-08-26 RX ADMIN — NIFEDIPINE 60 MG: 60 TABLET, FILM COATED, EXTENDED RELEASE ORAL at 08:26

## 2024-08-26 RX ADMIN — VANCOMYCIN HYDROCHLORIDE 125 MG: 125 CAPSULE ORAL at 05:54

## 2024-08-26 RX ADMIN — CALCITRIOL 0.5 MCG: 0.5 CAPSULE ORAL at 08:25

## 2024-08-26 RX ADMIN — ASPIRIN 81 MG 81 MG: 81 TABLET ORAL at 08:25

## 2024-08-26 RX ADMIN — ERYTHROMYCIN ETHYLSUCCINATE 200 MG: 400 SUSPENSION ORAL at 05:55

## 2024-08-26 RX ADMIN — PANTOPRAZOLE SODIUM 40 MG: 40 TABLET, DELAYED RELEASE ORAL at 05:54

## 2024-08-26 RX ADMIN — ISOSORBIDE MONONITRATE 60 MG: 60 TABLET, EXTENDED RELEASE ORAL at 08:25

## 2024-08-26 RX ADMIN — HEPARIN SODIUM 5000 UNITS: 5000 INJECTION INTRAVENOUS; SUBCUTANEOUS at 13:53

## 2024-08-26 RX ADMIN — INSULIN GLARGINE 4 UNITS: 100 INJECTION, SOLUTION SUBCUTANEOUS at 22:04

## 2024-08-26 RX ADMIN — INSULIN LISPRO 4 UNITS: 100 INJECTION, SOLUTION INTRAVENOUS; SUBCUTANEOUS at 17:38

## 2024-08-26 RX ADMIN — PREDNISONE 5 MG: 5 TABLET ORAL at 08:26

## 2024-08-26 RX ADMIN — PRAVASTATIN SODIUM 10 MG: 20 TABLET ORAL at 22:05

## 2024-08-26 RX ADMIN — HEPARIN SODIUM 5000 UNITS: 5000 INJECTION INTRAVENOUS; SUBCUTANEOUS at 22:05

## 2024-08-26 RX ADMIN — VANCOMYCIN HYDROCHLORIDE 125 MG: 125 CAPSULE ORAL at 22:05

## 2024-08-26 RX ADMIN — ASCORBIC ACID, THIAMINE MONONITRATE,RIBOFLAVIN, NIACINAMIDE, PYRIDOXINE HYDROCHLORIDE, FOLIC ACID, CYANOCOBALAMIN, BIOTIN, CALCIUM PANTOTHENATE, 1 CAPSULE: 100; 1.5; 1.7; 20; 10; 1; 6000; 150000; 5 CAPSULE, LIQUID FILLED ORAL at 08:25

## 2024-08-26 RX ADMIN — VANCOMYCIN HYDROCHLORIDE 125 MG: 125 CAPSULE ORAL at 13:53

## 2024-08-26 RX ADMIN — HEPARIN SODIUM 5000 UNITS: 5000 INJECTION INTRAVENOUS; SUBCUTANEOUS at 05:53

## 2024-08-26 RX ADMIN — CINACALCET 30 MG: 30 TABLET, FILM COATED ORAL at 08:26

## 2024-08-26 RX ADMIN — WATER 1500 MG: 1 INJECTION INTRAMUSCULAR; INTRAVENOUS; SUBCUTANEOUS at 17:37

## 2024-08-26 RX ADMIN — INSULIN LISPRO 6 UNITS: 100 INJECTION, SOLUTION INTRAVENOUS; SUBCUTANEOUS at 14:21

## 2024-08-26 RX ADMIN — CARVEDILOL 37.5 MG: 25 TABLET, FILM COATED ORAL at 08:25

## 2024-08-26 RX ADMIN — PIPERACILLIN SODIUM AND TAZOBACTAM SODIUM 3.38 G: 3; .375 INJECTION, SOLUTION INTRAVENOUS at 22:28

## 2024-08-26 RX ADMIN — ERYTHROMYCIN ETHYLSUCCINATE 200 MG: 400 SUSPENSION ORAL at 11:22

## 2024-08-26 ASSESSMENT — COGNITIVE AND FUNCTIONAL STATUS - GENERAL
DRESSING REGULAR UPPER BODY CLOTHING: A LITTLE
DRESSING REGULAR LOWER BODY CLOTHING: A LITTLE
WALKING IN HOSPITAL ROOM: A LITTLE
CLIMB 3 TO 5 STEPS WITH RAILING: TOTAL
PERSONAL GROOMING: A LITTLE
HELP NEEDED FOR BATHING: A LITTLE
DAILY ACTIVITIY SCORE: 18
MOBILITY SCORE: 18
DAILY ACTIVITIY SCORE: 18
TOILETING: A LITTLE
DRESSING REGULAR LOWER BODY CLOTHING: A LITTLE
PERSONAL GROOMING: A LITTLE
CLIMB 3 TO 5 STEPS WITH RAILING: A LITTLE
DRESSING REGULAR UPPER BODY CLOTHING: A LITTLE
TURNING FROM BACK TO SIDE WHILE IN FLAT BAD: A LITTLE
STANDING UP FROM CHAIR USING ARMS: A LITTLE
STANDING UP FROM CHAIR USING ARMS: A LITTLE
WALKING IN HOSPITAL ROOM: A LITTLE
MOVING FROM LYING ON BACK TO SITTING ON SIDE OF FLAT BED WITH BEDRAILS: A LITTLE
MOVING FROM LYING ON BACK TO SITTING ON SIDE OF FLAT BED WITH BEDRAILS: A LITTLE
EATING MEALS: A LITTLE
CLIMB 3 TO 5 STEPS WITH RAILING: A LITTLE
TOILETING: A LITTLE
EATING MEALS: A LITTLE
TURNING FROM BACK TO SIDE WHILE IN FLAT BAD: A LITTLE
STANDING UP FROM CHAIR USING ARMS: A LITTLE
MOBILITY SCORE: 17
WALKING IN HOSPITAL ROOM: A LOT
MOVING TO AND FROM BED TO CHAIR: A LITTLE
MOVING TO AND FROM BED TO CHAIR: A LITTLE
HELP NEEDED FOR BATHING: A LITTLE
MOVING TO AND FROM BED TO CHAIR: A LITTLE
MOBILITY SCORE: 18

## 2024-08-26 ASSESSMENT — PAIN SCALES - GENERAL
PAINLEVEL_OUTOF10: 0 - NO PAIN

## 2024-08-26 ASSESSMENT — ACTIVITIES OF DAILY LIVING (ADL): ADL_ASSISTANCE: INDEPENDENT

## 2024-08-26 ASSESSMENT — PAIN - FUNCTIONAL ASSESSMENT: PAIN_FUNCTIONAL_ASSESSMENT: 0-10

## 2024-08-26 NOTE — CARE PLAN
The patient's goals for the shift include Patient will tolerate diet with no c/o of N/V Diarrhea this shift    The clinical goals for the shift include Patient will remain free from falls, by the end of this shift.    Patient remained free from injuries during this shift.    Patient is in bed, HOB elevated, call light within reach.    Plan of care ongoing.

## 2024-08-26 NOTE — CONSULTS
Wound Care Consult     Visit Date: 8/26/2024      Patient Name: Manolo Bashir         MRN: [id number]           YOB: 1955     Reason for Consult: Wound to ventral scrotum.        Wound History: RN states patient will not allow anyone to assess scrotum.     Pertinent Labs:   Albumin   Date Value Ref Range Status   08/26/2024 2.9 (L) 3.4 - 5.0 g/dL Final   03/05/2024 3.2 (L) 3.4 - 5.0 g/dL Final     Albumin %   Date Value Ref Range Status   11/18/2023 77.6 % Final     Albumin, Urine Random   Date Value Ref Range Status   04/15/2024 873.9 Not established mg/L Final     Albumin/Protein Total, Ur   Date Value Ref Range Status   10/19/2022 29.6 Not Established % Final       Wound Assessment:  Wound 08/25/24 Scrotum Ventral (Active)   Wound Image   08/26/24 1120   Shape wart like lesions to ventral scrotum 08/26/24 1120   Drainage Description None 08/26/24 1120   Drainage Amount None 08/26/24 1120   Dressing Protective barrier 08/26/24 1120   Dressing Changed New 08/26/24 1120       Wound Team Summary Assessment: Patient has clusters of wart like lesions to the ventral portion of his penis and scrotum.  States he has a history of STD but was unable to remember the name. States the lesions itch at times, but not regularly. Applied barrier cream for comfort. 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 Notifying MD of above.     Wound Team Plan: Wound team will not continue to follow. Please re consult for any new wounds.     SUZANNE GERHARDT, RN, BSN, CWOCN  8/26/2024  2:54 PM

## 2024-08-26 NOTE — NURSING NOTE
Report from Sending RN:    Report From: kishan  Recent Surgery of Procedure: Yes 8/26/24 thoracentesis  Baseline Level of Consciousness (LOC): a/o x3 lethargic  Oxygen Use: No  Type: r/a  Diabetic: Yes  Last BP Med Given Day of Dialysis: none  Last Pain Med Given: none  Lab Tests to be Obtained with Dialysis: No  Blood Transfusion to be Given During Dialysis: No  Available IV Access: Yes  Medications to be Administered During Dialysis: No  Continuous IV Infusion Running: No  Restraints on Currently or in the Last 24 Hours: No  Hand-Off Communication: pt is stable and can come to dialysis for a treatment, pt is r/o c-diff   Dialysis Catheter Dressing: dry and intact   Last Dressing Change: rn unsure

## 2024-08-26 NOTE — SIGNIFICANT EVENT
Notified by RN that patients BP was 90s over 50s. SpO2 86. HR 65.     Went to bedside to assess patient. Diffuses rales on pulmonary exam.     Ordered STAT CXR, VBG, RFP, CBC, lactate and placed patient on 2L NC with improvement to % SPO2.    CXR with diffuse pulmonary edema. Labs pending.     Contacted nephrology for potential iHD today.     Added IV Vanc.    Kei Luther DO  PGY-1 Internal Medicine

## 2024-08-26 NOTE — CARE PLAN
The patient's goals for the shift include Patient will tolerate diet with no c/o of N/V Diarrhea this shift    The clinical goals for the shift include patients BP will be WNL during this shift    Over the shift, the patient did not make progress toward the following goals. Barriers to progression include  Recommendations to address these barriers include

## 2024-08-26 NOTE — NURSING NOTE
.Report to Receiving RN:    Report To: AYO Barrios  Time Report Called: [id number]  Hand-Off Communication: NO UF treatment due to low BP. BP 83/46 HR 69

## 2024-08-26 NOTE — POST-PROCEDURE NOTE
INTERVENTIONAL RADIOLOGY ADVANCED PRACTICE PROCEDURE  [facility]    A time out was performed and Right Hemithorax was examined with US and appropriate entry point was confirmed and marked.   The patient was prepped and draped in a sterile manner, 1% lidocaine was used to anesthesize the skin and subcutaneous tissue.   A 5F Centesis needle was then introduced through the skin into the pleural space, the centesis catheter was then threaded without difficulty.   850 ml of serosanguineous fluid was removed without difficulty. The catheter was then removed.   No immediate complications were noted during and immediately following the procedure.

## 2024-08-26 NOTE — PROGRESS NOTES
Attempted to meet with pt to discuss dispo plans but MD at bedside. Will attempt to meet with pt another time.

## 2024-08-26 NOTE — PROGRESS NOTES
Physical Therapy    Physical Therapy Evaluation    Patient Name: Manolo Bashir  MRN: [id number]  Today's Date: 8/26/2024   Time Calculation  Start Time: 1034  Stop Time: 1053  Time Calculation (min): 19 min  2073/2073-A    Assessment/Plan   PT Assessment  PT Assessment Results: Decreased strength, Decreased endurance, Impaired balance, Decreased mobility  Rehab Prognosis: Good  Barriers to Discharge: orthostatic  Evaluation/Treatment Tolerance: Other (Comment) (limited by unstable vitals)  Medical Staff Made Aware: Yes (BP's and symptomatic response reported to RN)  End of Session Communication: Bedside nurse  Assessment Comment: Pt. is a 68 y/o M admitted with PNA. Pt. presents with delayed processing, orthostatic hypotension, impaired balance, decreased endurance, and difficulty with all functional mobility compared to baseline. Pt. would benefit from skilled PT while IP to address these deficits.  End of Session Patient Position: Bed, 3 rail up, Alarm on  IP OR SWING BED PT PLAN  Inpatient or Swing Bed: Inpatient  PT Plan  Treatment/Interventions: Bed mobility, Transfer training, Gait training, Stair training, Balance training, Strengthening, Endurance training, Therapeutic exercise, Therapeutic activity, Home exercise program  PT Plan: Ongoing PT  PT Frequency: 3 times per week  PT Discharge Recommendations: Other (comment) (session limited by unstable vitals, unable to progress mobility. Continue to monitor functional progress throughout remainder of IP stay to make most appropraite recommendation. If pt. improves, likely appropraite for low intensity therapy at [place])  PT Recommended Transfer Status: Assist x1    Subjective       General Visit Information:  General  Reason for Referral: presented with features suggestive of pneumonia and gastroparesis. C.diff PCR positive. Thoracentesis today (8/26) w/ IR.  Past Medical History Relevant to Rehab: PMHx of ESRD (s/p renal transplant x2), prostate cancer (s/p radical  prostatectomy), T2DM, MGUS, HTN  Family/Caregiver Present: Yes  Caregiver Feedback: Significant other present and engaged  Prior to Session Communication: Bedside nurse  Patient Position Received: Bed, 3 rail up, Alarm off, not on at start of session  General Comment: Pt. supine in bed, +delayed verbal responses and hypotension noted. RN made aware.    Home Living:  Home Living  Type of Home: House  Lives With: Adult children (with daughter)  Home Adaptive Equipment: None  Home Layout: Multi-level, Bed/bath upstairs  Alternate Level Stairs-Rails: Right  Alternate Level Stairs-Number of Steps: 14  Home Access: Stairs to enter with rails  Entrance Stairs-Rails: Both  Entrance Stairs-Number of Steps: 6  Bathroom Shower/Tub: Tub/shower unit  Bathroom Equipment: None    Prior Level of Function:  Prior Function Per Pt/Caregiver Report  Level of [place]: Independent with ADLs and functional transfers, Independent with homemaking with ambulation  ADL Assistance: Independent  Homemaking Assistance: Independent  Ambulatory Assistance: Independent (no AD)  Vocational: On disability  Prior Function Comments: +drives, no report of recent falls.    Precautions:  Precautions  Medical Precautions: Fall precautions (contact plus precautions)    Vital Signs:  Vital Signs  Heart Rate: 71  Heart Rate Source: Monitor  SpO2: 96 %  BP:  (sittin/51 standing 85/40 +dizziness reported)  BP Location: Right arm  Objective     Pain:  Pain Assessment  Pain Assessment: 0-10  0-10 (Numeric) Pain Score: 0 - No pain    Cognition:  Cognition  Overall Cognitive Status: Impaired  Orientation Level:  (O x 3 with increased time and cueing. +delayed responsiveness and decreased eye contact)  Insight: Mild  Processing Speed: Delayed    General Assessments:      Activity Tolerance  Endurance: Decreased tolerance for upright activites (+orthostatic, limited tolerance for mobility this visit.)  Sensation  Light Touch: No apparent  deficits  Strength  Strength Comments: Grossly > 4/5 throughout B LE's        Postural Control  Postural Control: Within Functional Limits  Static Sitting Balance  Static Sitting-Balance Support: No upper extremity supported, Feet supported  Static Sitting-Level of Assistance: Close supervision  Dynamic Sitting Balance  Dynamic Sitting-Balance Support: No upper extremity supported, Feet supported  Dynamic Sitting-Level of Assistance: Close supervision  Static Standing Balance  Static Standing-Balance Support: No upper extremity supported  Static Standing-Level of Assistance: Contact guard  Static Standing-Comment/Number of Minutes: increased postural sway, +dizziness/ lightheadedness and hypotensive    Functional Assessments:     Bed Mobility  Bed Mobility: Yes  Bed Mobility 1  Bed Mobility 1: Supine to sitting, Sitting to supine  Level of Assistance 1: Distant supervision  Transfers  Transfer: Yes  Transfer 1  Technique 1: Sit to stand, Stand to sit  Transfer Device 1:  (no AD)  Transfer Level of Assistance 1: Contact guard  Ambulation/Gait Training  Ambulation/Gait Training Performed: No (Further mobility deferred secondary to +orthostatic hyoptension. Symptomatic as pt. endorses dizziness, improved with return to supine.)  Stairs  Stairs: No       Extremity/Trunk Assessments:        RLE   RLE : Within Functional Limits  LLE   LLE : Within Functional Limits    Outcome Measures:     [facility] Basic Mobility  Turning from your back to your side while in a flat bed without using bedrails: None  Moving from lying on your back to sitting on the side of a flat bed without using bedrails: None  Moving to and from bed to chair (including a wheelchair): A little  Standing up from a chair using your arms (e.g. wheelchair or bedside chair): A little  To walk in hospital room: A lot  Climbing 3-5 steps with railing: Total  Basic Mobility - Total Score: 17                                                              Goals:  Encounter Problems       Encounter Problems (Active)       PT Problem       Pt. will be independent for transfers with LRD with VSS       Start:  08/26/24    Expected End:  09/09/24            Pt. will ambulate > 150 ft. with LRD with VSS for safe household ambulation       Start:  08/26/24    Expected End:  09/09/24            Pt. will ascend/descend 1 flight of steps with 1 handrail to safely access the bed/bath within the home set up        Start:  08/26/24    Expected End:  09/09/24            Pt. will score > 25/28 on tinetti balance assessment to demonstrate reduced risk for falls.        Start:  08/26/24    Expected End:  09/09/24                     Education Documentation  Precautions, taught by Federica Perez, PT at 8/26/2024  2:16 PM.  Learner: Patient  Readiness: Acceptance  Method: Explanation  Response: Verbalizes Understanding, Needs Reinforcement    Body Mechanics, taught by Federica Perez, PT at 8/26/2024  2:16 PM.  Learner: Patient  Readiness: Acceptance  Method: Explanation  Response: Verbalizes Understanding, Needs Reinforcement    Mobility Training, taught by Federica Perez, PT at 8/26/2024  2:16 PM.  Learner: Patient  Readiness: Acceptance  Method: Explanation  Response: Verbalizes Understanding, Needs Reinforcement    Education Comments  No comments found.

## 2024-08-26 NOTE — PROGRESS NOTES
"Manolo Bashir is a 69 y.o. male on day 2 of admission presenting with Pneumonia symptoms.    Subjective   Patient appears to be feeling better this am. He reports continued chronic low back pain and generalized abdominal discomfort. He reports frequent soft stools in the past 24hrs. He denies headache, visual changes, nausea, vomiting.        Objective     Physical Exam  Constitutional:       General: He is not in acute distress.     Comments: Somnolent, but able to awaken when spoken to.    HENT:      Head: Normocephalic and atraumatic.      Mouth/Throat:      Mouth: Mucous membranes are moist.      Pharynx: Oropharynx is clear. No oropharyngeal exudate or posterior oropharyngeal erythema.   Eyes:      Extraocular Movements: Extraocular movements intact.   Cardiovascular:      Rate and Rhythm: Normal rate and regular rhythm.      Pulses: Normal pulses.      Heart sounds: Normal heart sounds. No murmur heard.     No gallop.   Pulmonary:      Effort: Pulmonary effort is normal. No respiratory distress.      Breath sounds: Rales (bilateral in the bases) present. No wheezing or rhonchi.   Abdominal:      General: Abdomen is flat. There is no distension.      Palpations: Abdomen is soft.      Tenderness: There is no abdominal tenderness.   Musculoskeletal:         General: No swelling or tenderness.      Right lower leg: No edema.      Left lower leg: No edema.   Skin:     General: Skin is warm and dry.   Neurological:      General: No focal deficit present.      Mental Status: He is oriented to person, place, and time.         Last Recorded Vitals  Blood pressure 120/56, pulse 97, temperature 36.8 °C (98.2 °F), resp. rate 18, height 1.727 m (5' 8\"), weight 72.6 kg (160 lb), SpO2 95%.  Intake/Output last 3 Shifts:  I/O last 3 completed shifts:  In: 770 (10.6 mL/kg) [P.O.:720; IV Piggyback:50]  Out: 626 (8.6 mL/kg) [Urine:625 (0.2 mL/kg/hr); Emesis/NG output:1]  Weight: 72.6 kg     Relevant Results  Results for orders " placed or performed during the hospital encounter of 08/24/24 (from the past 24 hour(s))   POCT GLUCOSE   Result Value Ref Range    POCT Glucose 117 (H) 74 - 99 mg/dL   POCT GLUCOSE   Result Value Ref Range    POCT Glucose 150 (H) 74 - 99 mg/dL   Electrocardiogram, 12-lead PRN ACS symptoms   Result Value Ref Range    Ventricular Rate 85 BPM    Atrial Rate 85 BPM    SD Interval 172 ms    QRS Duration 140 ms    QT Interval 418 ms    QTC Calculation(Bazett) 497 ms    P Axis 63 degrees    R Axis -29 degrees    T Axis 43 degrees    QRS Count 14 beats    Q Onset 215 ms    P Onset 129 ms    P Offset 189 ms    T Offset 424 ms    QTC Fredericia 469 ms   Blood Culture    Specimen: Peripheral Venipuncture; Blood culture   Result Value Ref Range    Blood Culture Loaded on Instrument - Culture in progress    POCT GLUCOSE   Result Value Ref Range    POCT Glucose 161 (H) 74 - 99 mg/dL   POCT GLUCOSE   Result Value Ref Range    POCT Glucose 150 (H) 74 - 99 mg/dL   CBC and Auto Differential   Result Value Ref Range    WBC 4.4 4.4 - 11.3 x10*3/uL    nRBC 0.0 0.0 - 0.0 /100 WBCs    RBC 2.86 (L) 4.50 - 5.90 x10*6/uL    Hemoglobin 7.7 (L) 13.5 - 17.5 g/dL    Hematocrit 22.5 (L) 41.0 - 52.0 %    MCV 79 (L) 80 - 100 fL    MCH 26.9 26.0 - 34.0 pg    MCHC 34.2 32.0 - 36.0 g/dL    RDW 12.8 11.5 - 14.5 %    Platelets 129 (L) 150 - 450 x10*3/uL    Neutrophils % 59.3 40.0 - 80.0 %    Immature Granulocytes %, Automated 0.2 0.0 - 0.9 %    Lymphocytes % 21.7 13.0 - 44.0 %    Monocytes % 14.2 2.0 - 10.0 %    Eosinophils % 4.1 0.0 - 6.0 %    Basophils % 0.5 0.0 - 2.0 %    Neutrophils Absolute 2.63 1.20 - 7.70 x10*3/uL    Immature Granulocytes Absolute, Automated 0.01 0.00 - 0.70 x10*3/uL    Lymphocytes Absolute 0.96 (L) 1.20 - 4.80 x10*3/uL    Monocytes Absolute 0.63 0.10 - 1.00 x10*3/uL    Eosinophils Absolute 0.18 0.00 - 0.70 x10*3/uL    Basophils Absolute 0.02 0.00 - 0.10 x10*3/uL   Basic metabolic panel   Result Value Ref Range    Glucose 182  (H) 74 - 99 mg/dL    Sodium 128 (L) 136 - 145 mmol/L    Potassium 4.5 3.5 - 5.3 mmol/L    Chloride 94 (L) 98 - 107 mmol/L    Bicarbonate 25 21 - 32 mmol/L    Anion Gap 14 10 - 20 mmol/L    Urea Nitrogen 33 (H) 6 - 23 mg/dL    Creatinine 6.53 (H) 0.50 - 1.30 mg/dL    eGFR 9 (L) >60 mL/min/1.73m*2    Calcium 8.9 8.6 - 10.6 mg/dL   Magnesium   Result Value Ref Range    Magnesium 1.68 1.60 - 2.40 mg/dL   Ammonia   Result Value Ref Range    Ammonia 26 16 - 53 umol/L     *Note: Due to a large number of results and/or encounters for the requested time period, some results have not been displayed. A complete set of results can be found in Results Review.       Electrocardiogram, 12-lead PRN ACS symptoms  Result Date: 8/26/2024  Sinus rhythm with frequent Premature ventricular complexes Nonspecific intraventricular block Cannot rule out Anterior infarct , age undetermined Abnormal ECG When compared with ECG of 24-AUG-2024 12:11, Premature ventricular complexes are now Present QRS axis Shifted left Nonspecific T wave abnormality no longer evident in Lateral leads    ECG 12 lead  Result Date: 8/24/2024  Normal sinus rhythm Nonspecific intraventricular block Minimal voltage criteria for LVH, may be normal variant ( Levar product ) Abnormal ECG When compared with ECG of 26-JUN-2024 08:26, No significant change was found See ED provider note for full interpretation and clinical correlation Confirmed by Faustina Workman ([id number]) on 8/24/2024 9:19:13 PM    CT abdomen pelvis wo IV contrast  Result Date: 8/24/2024  FINDINGS: Please note that the evaluation of vessels, lymph nodes and organs is limited without intravenous contrast.   LOWER CHEST: Moderate right pleural effusion. Small left pleural effusion. Mild adjacent lung atelectasis is noted. A few ground-glass opacities noted in the bilateral lower lobes which can be seen in the setting of pulmonary edema.. The heart is enlarged.  No evidence of pericardial effusion.   ABDOMEN:    LIVER: The liver is normal in size without evidence of focal lesions.   BILE DUCTS: The intrahepatic and extrahepatic ducts are not dilated.   GALLBLADDER: The gallbladder is not definitively visualized and may be decompressed versus surgically absent.   PANCREAS: The pancreas appears unremarkable without evidence of ductal dilatation or masses.   SPLEEN: The spleen is normal in size with no evidence of focal lesions.   ADRENAL GLANDS: Bilateral adrenal glands appear normal.   KIDNEYS AND URETERS: The bilateral kidneys are atrophic. Left iliac fossa renal transplant without associated perinephric fluid collection or hydroureteronephrosis..   PELVIS:   BLADDER: The urinary bladder is decompressed, limited for evaluation. There is mild bladder wall thickening, similar to prior, and suspected to be secondary to bladder decompression.   REPRODUCTIVE ORGANS: The prostate is not enlarged.   BOWEL: The stomach is unremarkable. The small bowel is normal in caliber without evidence of wall thickening throughout. Status post partial colectomy. The large bowel is otherwise normal in caliber and demonstrates no abnormal wall thickening. The appendix is not definitely visualized. There is however no pericecal stranding or fluid.   VESSELS: Diffuse vascular calcifications throughout the abdomen. The aorta and IVC otherwise unremarkable.   PERITONEUM/RETROPERITONEUM/LYMPH NODES: There is no free or loculated fluid collection, no free intraperitoneal air. The retroperitoneum appears normal.  No abdominopelvic lymphadenopathy is present.   ABDOMINAL WALL: The abdominal wall soft tissues appear normal.   BONES: No suspicious osseous lesions are identified. Degenerative discogenic disease is noted in the lower thoracic and lumbar spine. Mild compression deformity of the T10 vertebral body is likely degenerative.     1.  Bilateral pleural effusions right-greater-than-left with the ground-glass opacities in the bilateral lower lobes.  Findings can be seen with pulmonary edema.. 2. No acute pathology within the abdomen or pelvis to explain right-sided abdominal pain.      XR chest 1 view  Result Date: 8/24/2024  FINDINGS: Sizable area of right basilar consolidation consistent with pneumonia.   There is increased generalized prominence of the perihilar and basilar interstitium and a superimposed component of edema suggested     Sizable area of right basilar consolidation consistent with pneumonia.   There is increased generalized prominence of the perihilar and basilar interstitium and a superimposed component of edema suggested                         Assessment/Plan   Assessment & Plan  Pneumonia symptoms    Manolo Bashir is a 69 y.o. male with PMHx of ESRD (s/p renal transplant x2), prostate cancer (s/p radical prostatectomy), T2DM, MGUS, HTN who presented with features suggestive of pneumonia and gastroparesis.      Updates 08/26/24:  - UA with reflex not significant except of protenuria and 1+ bacteria.  - C.diff PCR positive but EIA negative - likely colonization.  - DC Cefepime d/t increased risk for neurotoxicity iso ESRD.   - Continue on PO Vanc and IV Zosyn.  - Thoracentesis today (8/26) w/ IR  - Continue to hold Reglan; continue Erythromycin for gastroparesis.     #Pneumonia  :: Has had multiple hospital admissions for pneumonia.  :: CXR 08/24 demonstrated right basilar consolidation; CT AP w/o contrast shows bilateral plural effusion and right basilar consolidation.  :: Started on cefepime and azithromycin in the ED; now on PO Vanc. and IV Zosyn.   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  :: H/o right hemicolectomy (2017).  :: Has been controlled with Loperamide.  :: Colonoscopy on 8/2023 - No polyps found. Repeat surveillance in 7 yrs (2030).   :: C. Diff PCR (+), EIA (-), currently receiving PO Vanc for decolonization    - Hold Loperamide.  - F/up on stool pathogen panel ordered in the ED.      #Nausea & Vomiting  :: Has gastroparesis; confirmed with [facility] 04/24.  :: Has been on Reglan 5mg bid (stopped recently), ProtoNix 40mg dly.  - Hold Reglan.  - Continue ProtoNix 40mg dly.  - Continue Erythromycin susp. 200mg tid     #ESRD s/p renal transplant (X2)  #Hyponatremia  :: Renal transplants in 1992 and 2013;  :: Now with impaired allograft function currently on immunosuppression (on Tacrolimus and Prednisone).  :: Baseline Cr 2.5 - 3, makes urine  :: On TThS iHD schedule; last session (08/24/24)  :: Transplant nephrology has been following and are aware of the current admission.   - Recommending stopping tacrolimus    - Continue prednisone 5 mg  :: Nephrology following, appreciate recs  - Hold Bumex and Tacrolimus.  - Continue Prednisone 5 mg  - Strict I&Os  - Follow daily RFP  - F/up serum osm, urine osm, urine electrolytes      #Asymptomatic bacteriuria    :: UA (8/25) with 1+ bacteria, proteinuria, otherwise unremarkable  - No need for further antibiotics, will follow if symptomatic    #Chronic anemia   :: Likely 2/2 to ESRD as above  :: Baseline Hgb ~7-9  - Continue to monitor with daily CBC    #T2DM  :: Last A1C 6.6 (7/10/24).  :: Home regimen: insulin glargine 20 units daily and SSI.  ::  on presentation, patient is not eating as usual.   - Continue basal Lantus at 4 units, will increase dose based on the AM BG.   - Continue on SSI #2.     #Chronic HTN  :: H/o multiple prior admissions for hypervolemia and hypertensive urgency.   :: Has been on (Hydralazine 100 mg TID, carvedilol 37.5 mg, BID, Nifedipine 60 mg daily, Bumex 2 mg QID on non iHD  days).  :: /79 on presentation  - Continue home Hydralazine, Coreg, Nifedipine and Imdur.  - Hold Bumex.     #Chronic lower back pain  - Encourage patient to be out of bed to chair 3 times daily with meals.  - Continue oxycodone.     ---------------------------------------------------------------------------  Fluids: PRN  Electrolytes: PRN  Nutrition: Adult diet Renal; Potassium Restricted 2 gm (50mEq); 2 - 3 grams Sodium  Access: PIV     DVT prophylaxis: SQH, SCD  GI Ppx: Pantoprazole      Abx: Zosyn, PO Vancomycin  O2: RA     Pain regimen: Oxy  GI Laxative: None     Code Status: Full Code (Confirmed on admission)   Emergency Contact / NOK: Extended Emergency Contact Information  Primary Emergency Contact: KETTY PLASENCIA  Address: [address] [place] [address] [facility]  Home Phone: [phone]  Work Phone: [phone]  Mobile Phone: [phone]  Relation: Friend  Preferred language: English  Secondary Emergency Contact: CLEMENTE ROB  Address: [address] [place] [address] [facility]  Home Phone: [phone]  Mobile Phone: [phone]  Relation: Daughter           Kei Luther DO  PGY-1 Internal Medicine

## 2024-08-26 NOTE — CONSULTS
T, Th, Sat dialysis schedule. Received 1500 mg loading dose. Will order 750 mg post each HD session. Vancomycin level ordered  first am labs, prior to HD session.     - Bety Murillo, Lisbeth    Vancomycin Dosing by Pharmacy- INITIAL    Manolo Bashir is a 69 y.o. year old male who Pharmacy has been consulted for vancomycin dosing for pneumonia. Based on the patient's indication and renal status this patient will be dosed based on a goal AUC of 400-600.     Renal function is currently dependant on IHD.    Visit Vitals  BP 91/50 Comment: nurse  notified   Pulse 69   Temp 37 °C (98.6 °F) (Temporal)   Resp 18        Lab Results   Component Value Date    CREATININE 7.20 (H) 2024    CREATININE 6.53 (H) 2024    CREATININE 4.10 (H) 2024    CREATININE 4.10 (H) 2024        Patient weight is as follows:   Vitals:    24 1206   Weight: 72.6 kg (160 lb)       Cultures:  No results found for the encounter in last 14 days.        I/O last 3 completed shifts:  In: 770 (10.6 mL/kg) [P.O.:720; IV Piggyback:50]  Out: 626 (8.6 mL/kg) [Urine:625 (0.2 mL/kg/hr); Emesis/NG output:1]  Weight: 72.6 kg   I/O during current shift:  I/O this shift:  In: -   Out: 250 [Urine:250]    Temp (24hrs), Av.1 °C (98.8 °F), Min:36.8 °C (98.2 °F), Max:37.5 °C (99.5 °F)         Assessment/Plan     Patient will be given a loading dose of 1500 mg.  Maintenance dosing will be determined once dialysis schedule is known.   Follow-up level to be determined based on dialysis schedule.   Will continue to monitor renal function daily while on vancomycin and order serum creatinine at least every 48 hours if not already ordered.  Follow for continued vancomycin needs, clinical response, and signs/symptoms of toxicity.       Bety Murillo, MarinaD

## 2024-08-27 ENCOUNTER — APPOINTMENT (OUTPATIENT)
Dept: RADIOLOGY | Facility: HOSPITAL | Age: 69
End: 2024-08-27
Payer: COMMERCIAL

## 2024-08-27 ENCOUNTER — APPOINTMENT (OUTPATIENT)
Dept: CARDIOLOGY | Facility: HOSPITAL | Age: 69
End: 2024-08-27
Payer: COMMERCIAL

## 2024-08-27 ENCOUNTER — APPOINTMENT (OUTPATIENT)
Dept: DIALYSIS | Facility: HOSPITAL | Age: 69
End: 2024-08-27
Payer: COMMERCIAL

## 2024-08-27 PROBLEM — Z99.2 ESRD (END STAGE RENAL DISEASE) ON DIALYSIS (MULTI): Status: ACTIVE | Noted: 2024-02-29

## 2024-08-27 LAB
ALBUMIN SERPL BCP-MCNC: 3 G/DL (ref 3.4–5)
ANION GAP SERPL CALC-SCNC: 13 MMOL/L (ref 10–20)
ANION GAP SERPL CALC-SCNC: 16 MMOL/L (ref 10–20)
ATRIAL RATE: 69 BPM
ATRIAL RATE: 85 BPM
BASOPHILS # BLD AUTO: 0.02 X10*3/UL (ref 0–0.1)
BASOPHILS NFR BLD AUTO: 0.4 %
BUN SERPL-MCNC: 14 MG/DL (ref 6–23)
BUN SERPL-MCNC: 41 MG/DL (ref 6–23)
CALCIUM SERPL-MCNC: 8.2 MG/DL (ref 8.6–10.6)
CALCIUM SERPL-MCNC: 8.3 MG/DL (ref 8.6–10.6)
CARDIAC TROPONIN I PNL SERPL HS: 29 NG/L (ref 0–53)
CHLORIDE SERPL-SCNC: 93 MMOL/L (ref 98–107)
CHLORIDE SERPL-SCNC: 96 MMOL/L (ref 98–107)
CO2 SERPL-SCNC: 25 MMOL/L (ref 21–32)
CO2 SERPL-SCNC: 30 MMOL/L (ref 21–32)
CREAT SERPL-MCNC: 4.59 MG/DL (ref 0.5–1.3)
CREAT SERPL-MCNC: 9.1 MG/DL (ref 0.5–1.3)
EGFRCR SERPLBLD CKD-EPI 2021: 13 ML/MIN/1.73M*2
EGFRCR SERPLBLD CKD-EPI 2021: 6 ML/MIN/1.73M*2
EOSINOPHIL # BLD AUTO: 0.34 X10*3/UL (ref 0–0.7)
EOSINOPHIL NFR BLD AUTO: 7.5 %
ERYTHROCYTE [DISTWIDTH] IN BLOOD BY AUTOMATED COUNT: 12.5 % (ref 11.5–14.5)
GLUCOSE BLD MANUAL STRIP-MCNC: 122 MG/DL (ref 74–99)
GLUCOSE BLD MANUAL STRIP-MCNC: 219 MG/DL (ref 74–99)
GLUCOSE BLD MANUAL STRIP-MCNC: 228 MG/DL (ref 74–99)
GLUCOSE BLD MANUAL STRIP-MCNC: 238 MG/DL (ref 74–99)
GLUCOSE SERPL-MCNC: 183 MG/DL (ref 74–99)
GLUCOSE SERPL-MCNC: 210 MG/DL (ref 74–99)
HBV SURFACE AB SER-ACNC: 3.6 MIU/ML
HBV SURFACE AG SERPL QL IA: NONREACTIVE
HCT VFR BLD AUTO: 21.1 % (ref 41–52)
HGB BLD-MCNC: 7 G/DL (ref 13.5–17.5)
IMM GRANULOCYTES # BLD AUTO: 0.02 X10*3/UL (ref 0–0.7)
IMM GRANULOCYTES NFR BLD AUTO: 0.4 % (ref 0–0.9)
LDH SERPL L TO P-CCNC: 156 U/L (ref 84–246)
LYMPHOCYTES # BLD AUTO: 0.45 X10*3/UL (ref 1.2–4.8)
LYMPHOCYTES NFR BLD AUTO: 10 %
MAGNESIUM SERPL-MCNC: 1.91 MG/DL (ref 1.6–2.4)
MAGNESIUM SERPL-MCNC: 2.11 MG/DL (ref 1.6–2.4)
MCH RBC QN AUTO: 27.3 PG (ref 26–34)
MCHC RBC AUTO-ENTMCNC: 33.2 G/DL (ref 32–36)
MCV RBC AUTO: 82 FL (ref 80–100)
MONOCYTES # BLD AUTO: 0.67 X10*3/UL (ref 0.1–1)
MONOCYTES NFR BLD AUTO: 14.9 %
NEUTROPHILS # BLD AUTO: 3.01 X10*3/UL (ref 1.2–7.7)
NEUTROPHILS NFR BLD AUTO: 66.8 %
NRBC BLD-RTO: 0 /100 WBCS (ref 0–0)
P AXIS: 111 DEGREES
P AXIS: 63 DEGREES
P OFFSET: 186 MS
P OFFSET: 189 MS
P ONSET: 120 MS
P ONSET: 129 MS
PHOSPHATE SERPL-MCNC: 2.2 MG/DL (ref 2.5–4.9)
PLATELET # BLD AUTO: 109 X10*3/UL (ref 150–450)
POTASSIUM SERPL-SCNC: 4 MMOL/L (ref 3.5–5.3)
POTASSIUM SERPL-SCNC: 4.2 MMOL/L (ref 3.5–5.3)
PR INTERVAL: 172 MS
PR INTERVAL: 188 MS
Q ONSET: 214 MS
Q ONSET: 215 MS
QRS COUNT: 11 BEATS
QRS COUNT: 14 BEATS
QRS DURATION: 140 MS
QRS DURATION: 144 MS
QT INTERVAL: 418 MS
QT INTERVAL: 444 MS
QTC CALCULATION(BAZETT): 475 MS
QTC CALCULATION(BAZETT): 497 MS
QTC FREDERICIA: 465 MS
QTC FREDERICIA: 469 MS
R AXIS: -29 DEGREES
R AXIS: 202 DEGREES
RBC # BLD AUTO: 2.56 X10*6/UL (ref 4.5–5.9)
SODIUM SERPL-SCNC: 130 MMOL/L (ref 136–145)
SODIUM SERPL-SCNC: 135 MMOL/L (ref 136–145)
T AXIS: 149 DEGREES
T AXIS: 43 DEGREES
T OFFSET: 424 MS
T OFFSET: 436 MS
VENTRICULAR RATE: 69 BPM
VENTRICULAR RATE: 85 BPM
WBC # BLD AUTO: 4.5 X10*3/UL (ref 4.4–11.3)

## 2024-08-27 PROCEDURE — 2500000001 HC RX 250 WO HCPCS SELF ADMINISTERED DRUGS (ALT 637 FOR MEDICARE OP)

## 2024-08-27 PROCEDURE — 71045 X-RAY EXAM CHEST 1 VIEW: CPT | Performed by: RADIOLOGY

## 2024-08-27 PROCEDURE — 2500000002 HC RX 250 W HCPCS SELF ADMINISTERED DRUGS (ALT 637 FOR MEDICARE OP, ALT 636 FOR OP/ED)

## 2024-08-27 PROCEDURE — 1200000002 HC GENERAL ROOM WITH TELEMETRY DAILY

## 2024-08-27 PROCEDURE — 2500000004 HC RX 250 GENERAL PHARMACY W/ HCPCS (ALT 636 FOR OP/ED): Performed by: INTERNAL MEDICINE

## 2024-08-27 PROCEDURE — 2500000004 HC RX 250 GENERAL PHARMACY W/ HCPCS (ALT 636 FOR OP/ED)

## 2024-08-27 PROCEDURE — 2500000005 HC RX 250 GENERAL PHARMACY W/O HCPCS

## 2024-08-27 PROCEDURE — 80048 BASIC METABOLIC PNL TOTAL CA: CPT | Mod: CCI

## 2024-08-27 PROCEDURE — 99233 SBSQ HOSP IP/OBS HIGH 50: CPT

## 2024-08-27 PROCEDURE — 36600 WITHDRAWAL OF ARTERIAL BLOOD: CPT

## 2024-08-27 PROCEDURE — 36415 COLL VENOUS BLD VENIPUNCTURE: CPT

## 2024-08-27 PROCEDURE — 80069 RENAL FUNCTION PANEL: CPT

## 2024-08-27 PROCEDURE — 84484 ASSAY OF TROPONIN QUANT: CPT

## 2024-08-27 PROCEDURE — 8010000001 HC DIALYSIS - HEMODIALYSIS PER DAY

## 2024-08-27 PROCEDURE — 93010 ELECTROCARDIOGRAM REPORT: CPT | Performed by: INTERNAL MEDICINE

## 2024-08-27 PROCEDURE — 93005 ELECTROCARDIOGRAM TRACING: CPT

## 2024-08-27 PROCEDURE — 85025 COMPLETE CBC W/AUTO DIFF WBC: CPT

## 2024-08-27 PROCEDURE — 86706 HEP B SURFACE ANTIBODY: CPT | Performed by: INTERNAL MEDICINE

## 2024-08-27 PROCEDURE — 87340 HEPATITIS B SURFACE AG IA: CPT | Performed by: INTERNAL MEDICINE

## 2024-08-27 PROCEDURE — 83735 ASSAY OF MAGNESIUM: CPT

## 2024-08-27 PROCEDURE — 90935 HEMODIALYSIS ONE EVALUATION: CPT | Performed by: INTERNAL MEDICINE

## 2024-08-27 PROCEDURE — 71045 X-RAY EXAM CHEST 1 VIEW: CPT

## 2024-08-27 PROCEDURE — 82947 ASSAY GLUCOSE BLOOD QUANT: CPT

## 2024-08-27 PROCEDURE — 5A1D70Z PERFORMANCE OF URINARY FILTRATION, INTERMITTENT, LESS THAN 6 HOURS PER DAY: ICD-10-PCS | Performed by: INTERNAL MEDICINE

## 2024-08-27 RX ORDER — LIDOCAINE 560 MG/1
1 PATCH PERCUTANEOUS; TOPICAL; TRANSDERMAL DAILY
Status: DISCONTINUED | OUTPATIENT
Start: 2024-08-27 | End: 2024-08-31 | Stop reason: HOSPADM

## 2024-08-27 RX ORDER — OXYCODONE HYDROCHLORIDE 5 MG/1
5 TABLET ORAL ONCE
Status: COMPLETED | OUTPATIENT
Start: 2024-08-27 | End: 2024-08-27

## 2024-08-27 RX ORDER — HYDRALAZINE HYDROCHLORIDE 25 MG/1
25 TABLET, FILM COATED ORAL EVERY 8 HOURS PRN
Status: DISCONTINUED | OUTPATIENT
Start: 2024-08-27 | End: 2024-08-28

## 2024-08-27 RX ORDER — INSULIN GLARGINE 100 [IU]/ML
10 INJECTION, SOLUTION SUBCUTANEOUS NIGHTLY
Status: DISCONTINUED | OUTPATIENT
Start: 2024-08-27 | End: 2024-08-28

## 2024-08-27 RX ADMIN — PIPERACILLIN SODIUM AND TAZOBACTAM SODIUM 2.25 G: 2; .25 INJECTION, SOLUTION INTRAVENOUS at 18:33

## 2024-08-27 RX ADMIN — INSULIN LISPRO 4 UNITS: 100 INJECTION, SOLUTION INTRAVENOUS; SUBCUTANEOUS at 18:33

## 2024-08-27 RX ADMIN — OXYCODONE HYDROCHLORIDE 5 MG: 5 TABLET ORAL at 11:51

## 2024-08-27 RX ADMIN — VANCOMYCIN HYDROCHLORIDE 125 MG: 125 CAPSULE ORAL at 18:33

## 2024-08-27 RX ADMIN — HEPARIN SODIUM 5000 UNITS: 5000 INJECTION INTRAVENOUS; SUBCUTANEOUS at 05:40

## 2024-08-27 RX ADMIN — VANCOMYCIN HYDROCHLORIDE 750 MG: 750 INJECTION, SOLUTION INTRAVENOUS at 11:53

## 2024-08-27 RX ADMIN — CINACALCET 30 MG: 30 TABLET, FILM COATED ORAL at 11:52

## 2024-08-27 RX ADMIN — VANCOMYCIN HYDROCHLORIDE 125 MG: 125 CAPSULE ORAL at 12:35

## 2024-08-27 RX ADMIN — HEPARIN SODIUM 5000 UNITS: 5000 INJECTION INTRAVENOUS; SUBCUTANEOUS at 21:14

## 2024-08-27 RX ADMIN — OXYCODONE HYDROCHLORIDE 5 MG: 5 TABLET ORAL at 22:17

## 2024-08-27 RX ADMIN — ERYTHROMYCIN ETHYLSUCCINATE 200 MG: 400 SUSPENSION ORAL at 05:42

## 2024-08-27 RX ADMIN — ISOSORBIDE MONONITRATE 60 MG: 60 TABLET, EXTENDED RELEASE ORAL at 11:51

## 2024-08-27 RX ADMIN — OXYCODONE HYDROCHLORIDE 5 MG: 5 TABLET ORAL at 19:03

## 2024-08-27 RX ADMIN — INSULIN GLARGINE 10 UNITS: 100 INJECTION, SOLUTION SUBCUTANEOUS at 21:08

## 2024-08-27 RX ADMIN — PANTOPRAZOLE SODIUM 40 MG: 40 TABLET, DELAYED RELEASE ORAL at 05:40

## 2024-08-27 RX ADMIN — VANCOMYCIN HYDROCHLORIDE 125 MG: 125 CAPSULE ORAL at 05:40

## 2024-08-27 RX ADMIN — CARVEDILOL 37.5 MG: 25 TABLET, FILM COATED ORAL at 21:15

## 2024-08-27 RX ADMIN — ASPIRIN 81 MG 81 MG: 81 TABLET ORAL at 11:52

## 2024-08-27 RX ADMIN — CALCITRIOL 0.5 MCG: 0.5 CAPSULE ORAL at 11:52

## 2024-08-27 RX ADMIN — VANCOMYCIN HYDROCHLORIDE 125 MG: 125 CAPSULE ORAL at 21:15

## 2024-08-27 RX ADMIN — PREDNISONE 5 MG: 5 TABLET ORAL at 11:51

## 2024-08-27 RX ADMIN — LIDOCAINE 1 PATCH: 4 PATCH TOPICAL at 22:17

## 2024-08-27 RX ADMIN — PIPERACILLIN SODIUM AND TAZOBACTAM SODIUM 3.38 G: 3; .375 INJECTION, SOLUTION INTRAVENOUS at 03:42

## 2024-08-27 RX ADMIN — HEPARIN SODIUM 5000 UNITS: 5000 INJECTION INTRAVENOUS; SUBCUTANEOUS at 12:36

## 2024-08-27 RX ADMIN — PRAVASTATIN SODIUM 10 MG: 20 TABLET ORAL at 21:14

## 2024-08-27 RX ADMIN — ERYTHROMYCIN ETHYLSUCCINATE 200 MG: 400 SUSPENSION ORAL at 11:52

## 2024-08-27 RX ADMIN — ACETAMINOPHEN 650 MG: 325 TABLET ORAL at 07:38

## 2024-08-27 RX ADMIN — ASCORBIC ACID, THIAMINE MONONITRATE,RIBOFLAVIN, NIACINAMIDE, PYRIDOXINE HYDROCHLORIDE, FOLIC ACID, CYANOCOBALAMIN, BIOTIN, CALCIUM PANTOTHENATE, 1 CAPSULE: 100; 1.5; 1.7; 20; 10; 1; 6000; 150000; 5 CAPSULE, LIQUID FILLED ORAL at 11:52

## 2024-08-27 ASSESSMENT — COGNITIVE AND FUNCTIONAL STATUS - GENERAL
HELP NEEDED FOR BATHING: A LITTLE
MOVING FROM LYING ON BACK TO SITTING ON SIDE OF FLAT BED WITH BEDRAILS: A LITTLE
TOILETING: A LITTLE
CLIMB 3 TO 5 STEPS WITH RAILING: A LITTLE
PERSONAL GROOMING: A LITTLE
EATING MEALS: A LITTLE
DRESSING REGULAR UPPER BODY CLOTHING: A LITTLE
WALKING IN HOSPITAL ROOM: A LITTLE
TURNING FROM BACK TO SIDE WHILE IN FLAT BAD: A LITTLE
CLIMB 3 TO 5 STEPS WITH RAILING: A LITTLE
STANDING UP FROM CHAIR USING ARMS: A LITTLE
STANDING UP FROM CHAIR USING ARMS: A LITTLE
DRESSING REGULAR LOWER BODY CLOTHING: A LITTLE
DAILY ACTIVITIY SCORE: 24
MOBILITY SCORE: 21
WALKING IN HOSPITAL ROOM: A LITTLE
MOVING TO AND FROM BED TO CHAIR: A LITTLE
DAILY ACTIVITIY SCORE: 18
MOBILITY SCORE: 18

## 2024-08-27 ASSESSMENT — PAIN SCALES - GENERAL
PAINLEVEL_OUTOF10: 9
PAINLEVEL_OUTOF10: 5 - MODERATE PAIN
PAINLEVEL_OUTOF10: 7

## 2024-08-27 ASSESSMENT — ACTIVITIES OF DAILY LIVING (ADL): LACK_OF_TRANSPORTATION: NO

## 2024-08-27 ASSESSMENT — PAIN DESCRIPTION - LOCATION
LOCATION: HEAD
LOCATION: BACK

## 2024-08-27 ASSESSMENT — PAIN DESCRIPTION - DESCRIPTORS: DESCRIPTORS: ACHING

## 2024-08-27 ASSESSMENT — PAIN - FUNCTIONAL ASSESSMENT: PAIN_FUNCTIONAL_ASSESSMENT: 0-10

## 2024-08-27 NOTE — NURSING NOTE
Report to Receiving RN:    Report To: Sophie ( RN)  Time Report Called: 1136 am  Hand-Off Communication: post vs 148/75; HR 87; fluid removed 1 liter  Complications During Treatment: No  Ultrafiltration Treatment: No  Medications Administered During Dialysis: No  Blood Products Administered During Dialysis: No  Labs Sent During Dialysis: Yes, hep b antigen/antibody, troponin, magnesium, CBC, BMP  Heparin Drip Rate Changes: No  Dialysis Catheter Dressing: left AVF  Last Dressing Change: yes    Electronic Signatures:   (Signed Stepan Lim RN)   Authored:    (Signed )   Authored:     Last Updated: 11:19 AM by STEPAN LIM

## 2024-08-27 NOTE — PROGRESS NOTES
Renal Staff HD Note    Patient seen, examined on dialysis   Tolerating treatment without event    BP Readings from Last 3 Encounters:   08/27/24 119/56   08/09/24 114/65   07/10/24 119/60     Lab Results   Component Value Date    CREATININE 7.20 (H) 08/26/2024    BUN 35 (H) 08/26/2024     (L) 08/26/2024    K 4.8 08/26/2024    CL 93 (L) 08/26/2024    CO2 25 08/26/2024     Lab Results   Component Value Date    .6 (H) 05/01/2024    CALCIUM 8.4 (L) 08/26/2024    PHOS 4.5 08/26/2024     @  Lab Results   Component Value Date    HGB 7.1 (L) 08/26/2024       Continue treatment per submitted orders

## 2024-08-27 NOTE — PROGRESS NOTES
"Manolo Bashir is a 69 y.o. male on day 3 of admission presenting with Pneumonia symptoms.    Subjective   Patient appears to be more alert this morning. He reports a generalized headache, denies visual changes. He reports dull chest pain slightly left of midline that is worse with palpation, not radiating, denies nausea currently. He reports continued chronic low back pain. and generalized abdominal discomfort. Diarrhea improving.        Objective     Physical Exam  Constitutional:       General: He is not in acute distress.     Comments: Somnolent, but able to awaken when spoken to.    HENT:      Head: Normocephalic and atraumatic.      Mouth/Throat:      Mouth: Mucous membranes are moist.      Pharynx: Oropharynx is clear. No oropharyngeal exudate or posterior oropharyngeal erythema.   Eyes:      Extraocular Movements: Extraocular movements intact.   Cardiovascular:      Rate and Rhythm: Normal rate and regular rhythm.      Pulses: Normal pulses.      Heart sounds: Normal heart sounds. No murmur heard.     No gallop.   Pulmonary:      Effort: Pulmonary effort is normal. No respiratory distress.      Breath sounds: Rhonchi and rales (bilateral in the bases) present. No wheezing.   Abdominal:      General: Abdomen is flat. There is no distension.      Palpations: Abdomen is soft.      Tenderness: There is no abdominal tenderness.   Musculoskeletal:         General: No swelling or tenderness.      Right lower leg: No edema.      Left lower leg: No edema.   Skin:     General: Skin is warm and dry.   Neurological:      General: No focal deficit present.      Mental Status: He is oriented to person, place, and time.       Last Recorded Vitals  Blood pressure 119/56, pulse 68, temperature 36.7 °C (98.1 °F), temperature source Skin, resp. rate 16, height 1.727 m (5' 8\"), weight 72.6 kg (160 lb), SpO2 98%.  Intake/Output last 3 Shifts:  I/O last 3 completed shifts:  In: 1200 (16.5 mL/kg) [I.V.:400 (5.5 mL/kg); IV " Piggyback:800]  Out: 550 (7.6 mL/kg) [Urine:550 (0.2 mL/kg/hr)]  Weight: 72.6 kg     Relevant Results  Results for orders placed or performed during the hospital encounter of 08/24/24 (from the past 24 hour(s))   Electrocardiogram, 12-lead PRN ACS symptoms   Result Value Ref Range    Ventricular Rate 69 BPM    Atrial Rate 69 BPM    NJ Interval 188 ms    QRS Duration 144 ms    QT Interval 444 ms    QTC Calculation(Bazett) 475 ms    P Axis 111 degrees    R Axis 202 degrees    T Axis 149 degrees    QRS Count 11 beats    Q Onset 214 ms    P Onset 120 ms    P Offset 186 ms    T Offset 436 ms    QTC Fredericia 465 ms   Lactate   Result Value Ref Range    Lactate 0.7 0.4 - 2.0 mmol/L   Renal function panel   Result Value Ref Range    Glucose 269 (H) 74 - 99 mg/dL    Sodium 129 (L) 136 - 145 mmol/L    Potassium 4.8 3.5 - 5.3 mmol/L    Chloride 93 (L) 98 - 107 mmol/L    Bicarbonate 25 21 - 32 mmol/L    Anion Gap 16 10 - 20 mmol/L    Urea Nitrogen 35 (H) 6 - 23 mg/dL    Creatinine 7.20 (H) 0.50 - 1.30 mg/dL    eGFR 8 (L) >60 mL/min/1.73m*2    Calcium 8.4 (L) 8.6 - 10.6 mg/dL    Phosphorus 4.5 2.5 - 4.9 mg/dL    Albumin 2.9 (L) 3.4 - 5.0 g/dL   CBC and Auto Differential   Result Value Ref Range    WBC 4.0 (L) 4.4 - 11.3 x10*3/uL    nRBC 0.0 0.0 - 0.0 /100 WBCs    RBC 2.62 (L) 4.50 - 5.90 x10*6/uL    Hemoglobin 7.1 (L) 13.5 - 17.5 g/dL    Hematocrit 22.0 (L) 41.0 - 52.0 %    MCV 84 80 - 100 fL    MCH 27.1 26.0 - 34.0 pg    MCHC 32.3 32.0 - 36.0 g/dL    RDW 12.9 11.5 - 14.5 %    Platelets 92 (L) 150 - 450 x10*3/uL    Neutrophils % 76.4 40.0 - 80.0 %    Immature Granulocytes %, Automated 0.3 0.0 - 0.9 %    Lymphocytes % 10.9 13.0 - 44.0 %    Monocytes % 10.6 2.0 - 10.0 %    Eosinophils % 1.3 0.0 - 6.0 %    Basophils % 0.5 0.0 - 2.0 %    Neutrophils Absolute 3.02 1.20 - 7.70 x10*3/uL    Immature Granulocytes Absolute, Automated 0.01 0.00 - 0.70 x10*3/uL    Lymphocytes Absolute 0.43 (L) 1.20 - 4.80 x10*3/uL    Monocytes Absolute  0.42 0.10 - 1.00 x10*3/uL    Eosinophils Absolute 0.05 0.00 - 0.70 x10*3/uL    Basophils Absolute 0.02 0.00 - 0.10 x10*3/uL   Blood Gas Venous Full Panel   Result Value Ref Range    POCT pH, Venous 7.32 (L) 7.33 - 7.43 pH    POCT pCO2, Venous 48 41 - 51 mm Hg    POCT pO2, Venous 55 (H) 35 - 45 mm Hg    POCT SO2, Venous 82 (H) 45 - 75 %    POCT Oxy Hemoglobin, Venous 79.7 (H) 45.0 - 75.0 %    POCT Hematocrit Calculated, Venous 23.0 (L) 41.0 - 52.0 %    POCT Sodium, Venous 126 (L) 136 - 145 mmol/L    POCT Potassium, Venous 5.0 3.5 - 5.3 mmol/L    POCT Chloride, Venous 95 (L) 98 - 107 mmol/L    POCT Ionized Calicum, Venous 1.25 1.10 - 1.33 mmol/L    POCT Glucose, Venous 296 (H) 74 - 99 mg/dL    POCT Lactate, Venous 0.9 0.4 - 2.0 mmol/L    POCT Base Excess, Venous -1.4 -2.0 - 3.0 mmol/L    POCT HCO3 Calculated, Venous 24.7 22.0 - 26.0 mmol/L    POCT Hemoglobin, Venous 7.8 (L) 13.5 - 17.5 g/dL    POCT Anion Gap, Venous 11.0 10.0 - 25.0 mmol/L    Patient Temperature 37.0 degrees Celsius    FiO2 28 %   Lactate Dehydrogenase   Result Value Ref Range     84 - 246 U/L   POCT GLUCOSE   Result Value Ref Range    POCT Glucose 261 (H) 74 - 99 mg/dL   POCT GLUCOSE   Result Value Ref Range    POCT Glucose 205 (H) 74 - 99 mg/dL   POCT GLUCOSE   Result Value Ref Range    POCT Glucose 207 (H) 74 - 99 mg/dL   POCT GLUCOSE   Result Value Ref Range    POCT Glucose 221 (H) 74 - 99 mg/dL   CBC and Auto Differential   Result Value Ref Range    WBC 4.5 4.4 - 11.3 x10*3/uL    nRBC 0.0 0.0 - 0.0 /100 WBCs    RBC 2.56 (L) 4.50 - 5.90 x10*6/uL    Hemoglobin 7.0 (L) 13.5 - 17.5 g/dL    Hematocrit 21.1 (L) 41.0 - 52.0 %    MCV 82 80 - 100 fL    MCH 27.3 26.0 - 34.0 pg    MCHC 33.2 32.0 - 36.0 g/dL    RDW 12.5 11.5 - 14.5 %    Platelets 109 (L) 150 - 450 x10*3/uL    Neutrophils % 66.8 40.0 - 80.0 %    Immature Granulocytes %, Automated 0.4 0.0 - 0.9 %    Lymphocytes % 10.0 13.0 - 44.0 %    Monocytes % 14.9 2.0 - 10.0 %    Eosinophils % 7.5  0.0 - 6.0 %    Basophils % 0.4 0.0 - 2.0 %    Neutrophils Absolute 3.01 1.20 - 7.70 x10*3/uL    Immature Granulocytes Absolute, Automated 0.02 0.00 - 0.70 x10*3/uL    Lymphocytes Absolute 0.45 (L) 1.20 - 4.80 x10*3/uL    Monocytes Absolute 0.67 0.10 - 1.00 x10*3/uL    Eosinophils Absolute 0.34 0.00 - 0.70 x10*3/uL    Basophils Absolute 0.02 0.00 - 0.10 x10*3/uL   Basic metabolic panel   Result Value Ref Range    Glucose 183 (H) 74 - 99 mg/dL    Sodium 130 (L) 136 - 145 mmol/L    Potassium 4.0 3.5 - 5.3 mmol/L    Chloride 93 (L) 98 - 107 mmol/L    Bicarbonate 25 21 - 32 mmol/L    Anion Gap 16 10 - 20 mmol/L    Urea Nitrogen 41 (H) 6 - 23 mg/dL    Creatinine 9.10 (H) 0.50 - 1.30 mg/dL    eGFR 6 (L) >60 mL/min/1.73m*2    Calcium 8.2 (L) 8.6 - 10.6 mg/dL   Magnesium   Result Value Ref Range    Magnesium 2.11 1.60 - 2.40 mg/dL   Hepatitis B surface antigen   Result Value Ref Range    Hepatitis B Surface AG Nonreactive Nonreactive   Hepatitis B surface antibody   Result Value Ref Range    Hepatitis B Surface AB 3.6 <10.0 mIU/mL   Troponin I, High Sensitivity   Result Value Ref Range    Troponin I, High Sensitivity ([facility]) 29 0 - 53 ng/L     *Note: Due to a large number of results and/or encounters for the requested time period, some results have not been displayed. A complete set of results can be found in Results Review.       Electrocardiogram, 12-lead PRN ACS symptoms  Result Date: 8/26/2024  Sinus rhythm with frequent Premature ventricular complexes Nonspecific intraventricular block Cannot rule out Anterior infarct , age undetermined Abnormal ECG When compared with ECG of 24-AUG-2024 12:11, Premature ventricular complexes are now Present QRS axis Shifted left Nonspecific T wave abnormality no longer evident in Lateral leads    ECG 12 lead  Result Date: 8/24/2024  Normal sinus rhythm Nonspecific intraventricular block Minimal voltage criteria for LVH, may be normal variant ( Pickton product ) Abnormal ECG When compared  with ECG of 26-JUN-2024 08:26, No significant change was found See ED provider note for full interpretation and clinical correlation Confirmed by Faustina Workman ([id number]) on 8/24/2024 9:19:13 PM    CT abdomen pelvis wo IV contrast  Result Date: 8/24/2024  FINDINGS: Please note that the evaluation of vessels, lymph nodes and organs is limited without intravenous contrast.   LOWER CHEST: Moderate right pleural effusion. Small left pleural effusion. Mild adjacent lung atelectasis is noted. A few ground-glass opacities noted in the bilateral lower lobes which can be seen in the setting of pulmonary edema.. The heart is enlarged.  No evidence of pericardial effusion.   ABDOMEN:   LIVER: The liver is normal in size without evidence of focal lesions.   BILE DUCTS: The intrahepatic and extrahepatic ducts are not dilated.   GALLBLADDER: The gallbladder is not definitively visualized and may be decompressed versus surgically absent.   PANCREAS: The pancreas appears unremarkable without evidence of ductal dilatation or masses.   SPLEEN: The spleen is normal in size with no evidence of focal lesions.   ADRENAL GLANDS: Bilateral adrenal glands appear normal.   KIDNEYS AND URETERS: The bilateral kidneys are atrophic. Left iliac fossa renal transplant without associated perinephric fluid collection or hydroureteronephrosis..   PELVIS:   BLADDER: The urinary bladder is decompressed, limited for evaluation. There is mild bladder wall thickening, similar to prior, and suspected to be secondary to bladder decompression.   REPRODUCTIVE ORGANS: The prostate is not enlarged.   BOWEL: The stomach is unremarkable. The small bowel is normal in caliber without evidence of wall thickening throughout. Status post partial colectomy. The large bowel is otherwise normal in caliber and demonstrates no abnormal wall thickening. The appendix is not definitely visualized. There is however no pericecal stranding or fluid.   VESSELS: Diffuse vascular  calcifications throughout the abdomen. The aorta and IVC otherwise unremarkable.   PERITONEUM/RETROPERITONEUM/LYMPH NODES: There is no free or loculated fluid collection, no free intraperitoneal air. The retroperitoneum appears normal.  No abdominopelvic lymphadenopathy is present.   ABDOMINAL WALL: The abdominal wall soft tissues appear normal.   BONES: No suspicious osseous lesions are identified. Degenerative discogenic disease is noted in the lower thoracic and lumbar spine. Mild compression deformity of the T10 vertebral body is likely degenerative.     1.  Bilateral pleural effusions right-greater-than-left with the ground-glass opacities in the bilateral lower lobes. Findings can be seen with pulmonary edema.. 2. No acute pathology within the abdomen or pelvis to explain right-sided abdominal pain.      XR chest 1 view  Result Date: 8/24/2024  FINDINGS: Sizable area of right basilar consolidation consistent with pneumonia.   There is increased generalized prominence of the perihilar and basilar interstitium and a superimposed component of edema suggested     Sizable area of right basilar consolidation consistent with pneumonia.   There is increased generalized prominence of the perihilar and basilar interstitium and a superimposed component of edema suggested                         Assessment/Plan   Assessment & Plan  Pneumonia symptoms    Manolo Bashir is a 69 y.o. male with PMHx of ESRD (s/p renal transplant x2), prostate cancer (s/p radical prostatectomy), T2DM, MGUS, HTN who presented with features suggestive of pneumonia and gastroparesis.      Updates 08/27/24:  - iHD today  - New/worsening chest pain - troponin wnl, follow up repeat EKG in afternoon  - Labile blood pressure - will unhold nifedipine pending blood pressure after dialysis, hydral transitioned to prn  - Diarrhea improving   - Continue IV Vanc, Zosyn for pneumonia  - Continue PO Vanc for C. Diff   - Continue to hold Reglan; continue  Erythromycin for gastroparesis.     #Pneumonia  :: Has had multiple hospital admissions for pneumonia.  :: CXR 08/24 demonstrated right basilar consolidation; CT AP w/o contrast shows bilateral plural effusion and right basilar consolidation.  :: Started on cefepime and azithromycin in the ED; now on PO Vanc. and IV Zosyn.   - Dc'd cefepime 8/25 d/t increased risk of neurotoxicity iso ESRD   - Completed 3 day course of azithromycin on 8/26  :: Urine Strept & Legionella antigens negative  :: Pro-calcitonin elevated at 0.25  :: MRSA probe negative  :: Blood cultures    -8/24 - NGTD   -8/25 - NGTD  :: Thoracentesis (8/26) w/ IR  - Lights Criteria by LDH and protein  - Culture data pending  - Repeat pro-calcitonin tomorrow   - Continue Zosyn (started 8/25), IV Vanc (started 8/26)    #Chest pain   ::Non-radiating left sided dull chest pain  :: troponin 8/27 wnl  -trend EKG  -continuous telemetry     #Diarrhea  :: Chronic, waxes and wanes; dates back to AUS placement in Feb. 2023 that got infected.  :: H/o right hemicolectomy (2017).  :: Has been controlled with Loperamide.  :: Colonoscopy on 8/2023 - No polyps found. Repeat surveillance in 7 yrs (2030).   :: C. Diff PCR (+), EIA (-), currently receiving PO Vanc for decolonization    - Hold Loperamide.  - F/up on stool pathogen panel ordered in the ED.      #Nausea & Vomiting  :: Has gastroparesis; confirmed with [facility] 04/24.  :: Has been on Reglan 5mg bid (stopped recently), ProtoNix 40mg dly.  - Hold Reglan.  - Continue ProtoNix 40mg dly.  - Continue Erythromycin susp. 200mg tid     #ESRD s/p renal transplant (X2)  #Hyponatremia  :: Renal transplants in 1992 and 2013;  :: Now with impaired allograft function currently on immunosuppression (on Tacrolimus and Prednisone).  :: Baseline Cr 2.5 - 3, makes urine  :: On [facility] iHD schedule; last session (08/24/24)  :: Transplant nephrology has been following and are aware of the current admission.   - Recommending stopping  tacrolimus    - Continue prednisone 5 mg  :: Nephrology following, appreciate recs   - Continue iHD schedule of [facility]Th[facility]  - Hold Bumex and Tacrolimus.  - Continue Prednisone 5 mg  - Strict I&Os  - Follow daily RFP  - F/up serum osm, urine osm, urine electrolytes      #Asymptomatic bacteriuria    :: UA (8/25) with 1+ bacteria, proteinuria, otherwise unremarkable  - No need for further antibiotics, will follow if symptomatic    #Chronic anemia   :: Likely 2/2 to ESRD as above  :: Baseline Hgb ~7-9  - Continue to monitor with daily CBC    #T2DM  :: Last A1C 6.6 (7/10/24).  :: Home regimen: insulin glargine 20 units daily and SSI.  ::  on presentation, patient is not eating as usual.   - Increase basal Lantus to 10 units, will increase dose based on the AM BG.   - Continue on SSI #2.     #Chronic HTN  :: H/o multiple prior admissions for hypervolemia and hypertensive urgency.   :: Has been on (Hydralazine 100 mg TID, carvedilol 37.5 mg, BID, Nifedipine 60 mg daily, Bumex 2 mg QID on non iHD days).  :: /79 on presentation, continues to be labile   - Continue home Coreg and Imdur.  - Hold home hydralazine, transitioned to hydralazine 25 mg PO prn q6 prn for SBP >170  - Currently holding home Nifedipine, but will resume pending BP after iHD  - Hold Bumex.     #Chronic lower back pain  - Encourage patient to be out of bed to chair 3 times daily with meals.  - Continue oxycodone.     ---------------------------------------------------------------------------  Fluids: PRN  Electrolytes: PRN  Nutrition: Adult diet Renal; Potassium Restricted 2 gm (50mEq); 2 - 3 grams Sodium  Access: PIV     DVT prophylaxis: SQH, SCD  GI Ppx: Pantoprazole      Abx: IV Vanc, Zosyn, PO Vancomycin, Erythromycin   O2: RA     Pain regimen: Oxy  GI Laxative: None     Code Status: Full Code (Confirmed on admission)   Emergency Contact / NOK: Extended Emergency Contact Information  Primary Emergency Contact: KETTY PLASENCIA  Address: [address]  [place] [address] [facility]  Home Phone: [phone]  Work Phone: [phone]  Mobile Phone: [phone]  Relation: Friend  Preferred language: English  Secondary Emergency Contact: CLEMENTE ROB  Address: [address] [place] [address] [facility]  Home Phone: [phone]  Mobile Phone: [phone]  Relation: Daughter           Kei Luther DO  PGY-1 Internal Medicine

## 2024-08-27 NOTE — NURSING NOTE
Report from Sending RN:    Report From: Ashley  Recent Surgery of Procedure: No  Baseline Level of Consciousness (LOC): A and O x 3  Oxygen Use: yes  Type: 2 LPM NC  Diabetic: Yes  Last BP Med Given Day of Dialysis: None, BP is 119/ 56, P 68  Last Pain Med Given: None  Lab Tests to be Obtained with Dialysis: Yes  Blood Transfusion to be Given During Dialysis: No  Available IV Access: Yes  Medications to be Administered During Dialysis: No  Continuous IV Infusion Running: No  Restraints on Currently or in the Last 24 Hours: No  Hand-Off Communication: Pt is much better and can come to [id number]  Dialysis Catheter Dressing: Intact  Last Dressing Change: Unknown

## 2024-08-27 NOTE — PROGRESS NOTES
Occupational Therapy                 Therapy Communication Note    Patient Name: Manolo Bashir  MRN: [id number]  Today's Date: 8/27/2024     Discipline: Occupational Therapy    Missed Visit Reason: Missed Visit Reason:  (Pt currenty off floor, will attempt to f/u with pt as schedule permits.)    Missed Time: Attempt 09:29      08/27/24 at 9:30 AM - Jackie Ford OT

## 2024-08-27 NOTE — PROGRESS NOTES
Renal Staff HD Note    Patient seen, examined in dialysis unit. His BP is low and we can not do UF.       BP Readings from Last 3 Encounters:   08/26/24 91/50   08/09/24 114/65   07/10/24 119/60     [unfilled]  Lab Results   Component Value Date    CREATININE 7.20 (H) 08/26/2024    BUN 35 (H) 08/26/2024     (L) 08/26/2024    K 4.8 08/26/2024    CL 93 (L) 08/26/2024    CO2 25 08/26/2024     Lab Results   Component Value Date    .6 (H) 05/01/2024    CALCIUM 8.4 (L) 08/26/2024    PHOS 4.5 08/26/2024     @  Lab Results   Component Value Date    HGB 7.1 (L) 08/26/2024     Because of low BP he did not receive UF treatment. Tomorrow is his HD day.     -Midodrine 10 mg one hour before dialysis.  -HD tomorrow  Continue treatment per submitted orders

## 2024-08-27 NOTE — CARE PLAN
The patient's goals for the shift include Patient will tolerate diet with no c/o of N/V Diarrhea this shift    The clinical goals for the shift include Patient will remain free from falls, by the end of this shift.    Patient remained free from injuries during this shift.     Patient is in bed, HOB elevated, call light within reach.    BP meds were held on this shift, per MD, due to Hypotension.     Patient is scheduled for dialysis this morning at 0700, since he missed dialysis yesterday, due to hypotension. Report give to Dialysis nurse.     This morning his BP is 119/56, pulse 69, 98% on 2L via NC.      Plan of care ongoing.

## 2024-08-28 ENCOUNTER — APPOINTMENT (OUTPATIENT)
Dept: CARDIOLOGY | Facility: HOSPITAL | Age: 69
End: 2024-08-28
Payer: COMMERCIAL

## 2024-08-28 LAB
ALBUMIN SERPL BCP-MCNC: 3 G/DL (ref 3.4–5)
ANION GAP SERPL CALC-SCNC: 14 MMOL/L (ref 10–20)
ATRIAL RATE: 89 BPM
B-OH-BUTYR SERPL-SCNC: 0.14 MMOL/L (ref 0.02–0.27)
BACTERIA BLD CULT: NORMAL
BACTERIA BLD CULT: NORMAL
BASOPHILS # BLD AUTO: 0.02 X10*3/UL (ref 0–0.1)
BASOPHILS NFR BLD AUTO: 0.5 %
BUN SERPL-MCNC: 18 MG/DL (ref 6–23)
CALCIUM SERPL-MCNC: 8.5 MG/DL (ref 8.6–10.6)
CHLORIDE SERPL-SCNC: 97 MMOL/L (ref 98–107)
CO2 SERPL-SCNC: 30 MMOL/L (ref 21–32)
CREAT SERPL-MCNC: 6.04 MG/DL (ref 0.5–1.3)
EGFRCR SERPLBLD CKD-EPI 2021: 9 ML/MIN/1.73M*2
EOSINOPHIL # BLD AUTO: 0.25 X10*3/UL (ref 0–0.7)
EOSINOPHIL NFR BLD AUTO: 5.7 %
ERYTHROCYTE [DISTWIDTH] IN BLOOD BY AUTOMATED COUNT: 12.5 % (ref 11.5–14.5)
GLUCOSE BLD MANUAL STRIP-MCNC: 123 MG/DL (ref 74–99)
GLUCOSE BLD MANUAL STRIP-MCNC: 90 MG/DL (ref 74–99)
GLUCOSE SERPL-MCNC: 124 MG/DL (ref 74–99)
HCT VFR BLD AUTO: 21 % (ref 41–52)
HGB BLD-MCNC: 7.1 G/DL (ref 13.5–17.5)
IMM GRANULOCYTES # BLD AUTO: 0.02 X10*3/UL (ref 0–0.7)
IMM GRANULOCYTES NFR BLD AUTO: 0.5 % (ref 0–0.9)
LYMPHOCYTES # BLD AUTO: 0.62 X10*3/UL (ref 1.2–4.8)
LYMPHOCYTES NFR BLD AUTO: 14.2 %
MAGNESIUM SERPL-MCNC: 1.92 MG/DL (ref 1.6–2.4)
MCH RBC QN AUTO: 27 PG (ref 26–34)
MCHC RBC AUTO-ENTMCNC: 33.8 G/DL (ref 32–36)
MCV RBC AUTO: 80 FL (ref 80–100)
MONOCYTES # BLD AUTO: 0.44 X10*3/UL (ref 0.1–1)
MONOCYTES NFR BLD AUTO: 10.1 %
NEUTROPHILS # BLD AUTO: 3.01 X10*3/UL (ref 1.2–7.7)
NEUTROPHILS NFR BLD AUTO: 69 %
NRBC BLD-RTO: 0 /100 WBCS (ref 0–0)
P AXIS: 74 DEGREES
P OFFSET: 182 MS
P ONSET: 114 MS
PHOSPHATE SERPL-MCNC: 2.7 MG/DL (ref 2.5–4.9)
PLATELET # BLD AUTO: 119 X10*3/UL (ref 150–450)
POTASSIUM SERPL-SCNC: 3.8 MMOL/L (ref 3.5–5.3)
PR INTERVAL: 194 MS
PROCALCITONIN SERPL-MCNC: 0.86 NG/ML
Q ONSET: 211 MS
QRS COUNT: 15 BEATS
QRS DURATION: 114 MS
QT INTERVAL: 406 MS
QTC CALCULATION(BAZETT): 493 MS
QTC FREDERICIA: 462 MS
R AXIS: 112 DEGREES
RBC # BLD AUTO: 2.63 X10*6/UL (ref 4.5–5.9)
SODIUM SERPL-SCNC: 137 MMOL/L (ref 136–145)
T AXIS: 18 DEGREES
T OFFSET: 414 MS
VENTRICULAR RATE: 89 BPM
WBC # BLD AUTO: 4.4 X10*3/UL (ref 4.4–11.3)

## 2024-08-28 PROCEDURE — 84145 PROCALCITONIN (PCT): CPT

## 2024-08-28 PROCEDURE — 2500000002 HC RX 250 W HCPCS SELF ADMINISTERED DRUGS (ALT 637 FOR MEDICARE OP, ALT 636 FOR OP/ED)

## 2024-08-28 PROCEDURE — 93005 ELECTROCARDIOGRAM TRACING: CPT

## 2024-08-28 PROCEDURE — 6350000001 HC RX 635 EPOETIN >10,000 UNITS: Performed by: INTERNAL MEDICINE

## 2024-08-28 PROCEDURE — 2500000001 HC RX 250 WO HCPCS SELF ADMINISTERED DRUGS (ALT 637 FOR MEDICARE OP)

## 2024-08-28 PROCEDURE — 99233 SBSQ HOSP IP/OBS HIGH 50: CPT | Performed by: NURSE PRACTITIONER

## 2024-08-28 PROCEDURE — 2500000004 HC RX 250 GENERAL PHARMACY W/ HCPCS (ALT 636 FOR OP/ED): Performed by: STUDENT IN AN ORGANIZED HEALTH CARE EDUCATION/TRAINING PROGRAM

## 2024-08-28 PROCEDURE — 2500000004 HC RX 250 GENERAL PHARMACY W/ HCPCS (ALT 636 FOR OP/ED)

## 2024-08-28 PROCEDURE — 1200000002 HC GENERAL ROOM WITH TELEMETRY DAILY

## 2024-08-28 PROCEDURE — 80069 RENAL FUNCTION PANEL: CPT

## 2024-08-28 PROCEDURE — 85025 COMPLETE CBC W/AUTO DIFF WBC: CPT

## 2024-08-28 PROCEDURE — 82947 ASSAY GLUCOSE BLOOD QUANT: CPT

## 2024-08-28 PROCEDURE — 82010 KETONE BODYS QUAN: CPT | Performed by: STUDENT IN AN ORGANIZED HEALTH CARE EDUCATION/TRAINING PROGRAM

## 2024-08-28 PROCEDURE — 99232 SBSQ HOSP IP/OBS MODERATE 35: CPT

## 2024-08-28 PROCEDURE — 36415 COLL VENOUS BLD VENIPUNCTURE: CPT

## 2024-08-28 PROCEDURE — 83735 ASSAY OF MAGNESIUM: CPT

## 2024-08-28 PROCEDURE — 2500000005 HC RX 250 GENERAL PHARMACY W/O HCPCS

## 2024-08-28 RX ORDER — INSULIN GLARGINE 100 [IU]/ML
5 INJECTION, SOLUTION SUBCUTANEOUS NIGHTLY
Status: DISCONTINUED | OUTPATIENT
Start: 2024-08-28 | End: 2024-08-30

## 2024-08-28 RX ORDER — HYDRALAZINE HYDROCHLORIDE 50 MG/1
50 TABLET, FILM COATED ORAL 3 TIMES DAILY
Status: DISCONTINUED | OUTPATIENT
Start: 2024-08-28 | End: 2024-08-30

## 2024-08-28 RX ORDER — POLYETHYLENE GLYCOL 3350 17 G/17G
17 POWDER, FOR SOLUTION ORAL DAILY
Status: DISCONTINUED | OUTPATIENT
Start: 2024-08-28 | End: 2024-08-31 | Stop reason: HOSPADM

## 2024-08-28 RX ORDER — TACROLIMUS 0.5 MG/1
0.5 CAPSULE ORAL
Status: DISCONTINUED | OUTPATIENT
Start: 2024-08-28 | End: 2024-08-31 | Stop reason: HOSPADM

## 2024-08-28 RX ORDER — HYDRALAZINE HYDROCHLORIDE 20 MG/ML
10 INJECTION INTRAMUSCULAR; INTRAVENOUS EVERY 4 HOURS PRN
Status: DISCONTINUED | OUTPATIENT
Start: 2024-08-28 | End: 2024-08-29

## 2024-08-28 RX ORDER — ACETAMINOPHEN 325 MG/1
975 TABLET ORAL EVERY 8 HOURS
Status: DISCONTINUED | OUTPATIENT
Start: 2024-08-28 | End: 2024-08-31 | Stop reason: HOSPADM

## 2024-08-28 RX ORDER — SENNOSIDES 8.6 MG/1
1 TABLET ORAL NIGHTLY
Status: DISCONTINUED | OUTPATIENT
Start: 2024-08-28 | End: 2024-08-31 | Stop reason: HOSPADM

## 2024-08-28 RX ORDER — ONDANSETRON HYDROCHLORIDE 2 MG/ML
4 INJECTION, SOLUTION INTRAVENOUS ONCE
Status: COMPLETED | OUTPATIENT
Start: 2024-08-28 | End: 2024-08-28

## 2024-08-28 RX ORDER — PREDNISONE 5 MG/1
10 TABLET ORAL EVERY MORNING
Status: DISCONTINUED | OUTPATIENT
Start: 2024-08-29 | End: 2024-08-30

## 2024-08-28 RX ORDER — HYDRALAZINE HYDROCHLORIDE 50 MG/1
50 TABLET, FILM COATED ORAL ONCE
Status: COMPLETED | OUTPATIENT
Start: 2024-08-28 | End: 2024-08-28

## 2024-08-28 RX ORDER — AMOXICILLIN AND CLAVULANATE POTASSIUM 500; 125 MG/1; MG/1
1 TABLET, FILM COATED ORAL EVERY 24 HOURS
Status: DISPENSED | OUTPATIENT
Start: 2024-08-28 | End: 2024-08-30

## 2024-08-28 RX ADMIN — VANCOMYCIN HYDROCHLORIDE 125 MG: 125 CAPSULE ORAL at 21:56

## 2024-08-28 RX ADMIN — TRIMETHOBENZAMIDE HYDROCHLORIDE 200 MG: 100 INJECTION INTRAMUSCULAR at 17:38

## 2024-08-28 RX ADMIN — CARVEDILOL 37.5 MG: 25 TABLET, FILM COATED ORAL at 09:44

## 2024-08-28 RX ADMIN — CARVEDILOL 37.5 MG: 25 TABLET, FILM COATED ORAL at 21:54

## 2024-08-28 RX ADMIN — ACETAMINOPHEN 975 MG: 325 TABLET ORAL at 21:56

## 2024-08-28 RX ADMIN — VANCOMYCIN HYDROCHLORIDE 125 MG: 125 CAPSULE ORAL at 06:25

## 2024-08-28 RX ADMIN — ASCORBIC ACID, THIAMINE MONONITRATE,RIBOFLAVIN, NIACINAMIDE, PYRIDOXINE HYDROCHLORIDE, FOLIC ACID, CYANOCOBALAMIN, BIOTIN, CALCIUM PANTOTHENATE, 1 CAPSULE: 100; 1.5; 1.7; 20; 10; 1; 6000; 150000; 5 CAPSULE, LIQUID FILLED ORAL at 09:44

## 2024-08-28 RX ADMIN — PANTOPRAZOLE SODIUM 40 MG: 40 TABLET, DELAYED RELEASE ORAL at 06:25

## 2024-08-28 RX ADMIN — EPOETIN ALFA 20000 UNITS: 20000 SOLUTION INTRAVENOUS; SUBCUTANEOUS at 03:49

## 2024-08-28 RX ADMIN — PRAVASTATIN SODIUM 10 MG: 20 TABLET ORAL at 21:54

## 2024-08-28 RX ADMIN — CALCITRIOL 0.5 MCG: 0.5 CAPSULE ORAL at 09:47

## 2024-08-28 RX ADMIN — DEXAMETHASONE SODIUM PHOSPHATE 1.6 MG: 4 INJECTION, SOLUTION INTRA-ARTICULAR; INTRALESIONAL; INTRAMUSCULAR; INTRAVENOUS; SOFT TISSUE at 22:37

## 2024-08-28 RX ADMIN — PREDNISONE 5 MG: 5 TABLET ORAL at 09:45

## 2024-08-28 RX ADMIN — ASPIRIN 81 MG 81 MG: 81 TABLET ORAL at 09:45

## 2024-08-28 RX ADMIN — HEPARIN SODIUM 5000 UNITS: 5000 INJECTION INTRAVENOUS; SUBCUTANEOUS at 21:49

## 2024-08-28 RX ADMIN — INSULIN GLARGINE 5 UNITS: 100 INJECTION, SOLUTION SUBCUTANEOUS at 22:37

## 2024-08-28 RX ADMIN — OXYCODONE HYDROCHLORIDE 5 MG: 5 TABLET ORAL at 06:25

## 2024-08-28 RX ADMIN — PIPERACILLIN SODIUM AND TAZOBACTAM SODIUM 2.25 G: 2; .25 INJECTION, SOLUTION INTRAVENOUS at 00:19

## 2024-08-28 RX ADMIN — TACROLIMUS 0.5 MG: 0.5 CAPSULE ORAL at 21:54

## 2024-08-28 RX ADMIN — HYDRALAZINE HYDROCHLORIDE 50 MG: 50 TABLET ORAL at 09:52

## 2024-08-28 RX ADMIN — ISOSORBIDE MONONITRATE 60 MG: 60 TABLET, EXTENDED RELEASE ORAL at 09:44

## 2024-08-28 RX ADMIN — SENNOSIDES 8.6 MG: 8.6 TABLET, FILM COATED ORAL at 21:55

## 2024-08-28 RX ADMIN — ERYTHROMYCIN ETHYLSUCCINATE 200 MG: 400 SUSPENSION ORAL at 06:26

## 2024-08-28 RX ADMIN — CINACALCET 30 MG: 30 TABLET, FILM COATED ORAL at 09:44

## 2024-08-28 RX ADMIN — ONDANSETRON 4 MG: 2 INJECTION INTRAMUSCULAR; INTRAVENOUS at 09:37

## 2024-08-28 RX ADMIN — HEPARIN SODIUM 5000 UNITS: 5000 INJECTION INTRAVENOUS; SUBCUTANEOUS at 05:07

## 2024-08-28 RX ADMIN — ERYTHROMYCIN LACTOBIONATE 200 MG: 500 INJECTION, POWDER, LYOPHILIZED, FOR SOLUTION INTRAVENOUS at 22:17

## 2024-08-28 RX ADMIN — HYDRALAZINE HYDROCHLORIDE 50 MG: 50 TABLET ORAL at 16:07

## 2024-08-28 ASSESSMENT — COGNITIVE AND FUNCTIONAL STATUS - GENERAL
CLIMB 3 TO 5 STEPS WITH RAILING: A LITTLE
MOBILITY SCORE: 23
DAILY ACTIVITIY SCORE: 24

## 2024-08-28 ASSESSMENT — PAIN SCALES - GENERAL
PAINLEVEL_OUTOF10: 0 - NO PAIN
PAINLEVEL_OUTOF10: 7

## 2024-08-28 NOTE — PROGRESS NOTES
"Manolo Bashir is a 69 y.o. male on day 4 of admission presenting with Pneumonia symptoms.    Subjective   Patient appears to be more alert this morning. He reported one episode of emesis this am after receiving his morning meds. He refused telemetry despite education. He denies chest pain currently. He reports continued chronic low back pain. Reports 1 soft bowel movement in the past 24 hrs.        Objective     Physical Exam  Constitutional:       General: He is not in acute distress.     Comments: Somnolent, but able to awaken when spoken to.    HENT:      Head: Normocephalic and atraumatic.      Mouth/Throat:      Mouth: Mucous membranes are moist.      Pharynx: Oropharynx is clear. No oropharyngeal exudate or posterior oropharyngeal erythema.   Eyes:      Extraocular Movements: Extraocular movements intact.   Cardiovascular:      Rate and Rhythm: Normal rate and regular rhythm.      Pulses: Normal pulses.      Heart sounds: Normal heart sounds. No murmur heard.     No gallop.   Pulmonary:      Effort: Pulmonary effort is normal. No respiratory distress.      Breath sounds: Rhonchi and rales (bilateral in the bases) present. No wheezing.   Abdominal:      General: Abdomen is flat. There is no distension.      Palpations: Abdomen is soft.      Tenderness: There is no abdominal tenderness.   Musculoskeletal:         General: Tenderness (to palpation of the lower back) present. No swelling.      Right lower leg: No edema.      Left lower leg: No edema.   Skin:     General: Skin is warm and dry.   Neurological:      General: No focal deficit present.      Mental Status: He is alert and oriented to person, place, and time.         Last Recorded Vitals  Blood pressure 174/79, pulse 66, temperature 36.4 °C (97.5 °F), temperature source Temporal, resp. rate 15, height 1.727 m (5' 8\"), weight 72.6 kg (160 lb), SpO2 99%.  Intake/Output last 3 Shifts:  I/O last 3 completed shifts:  In: 2350 (32.4 mL/kg) [P.O.:100; I.V.:800 " (11 mL/kg); Other:800; IV Piggyback:650]  Out: 2800 (38.6 mL/kg) [Other:2800]  Weight: 72.6 kg     Relevant Results  Results for orders placed or performed during the hospital encounter of 08/24/24 (from the past 24 hour(s))   POCT GLUCOSE   Result Value Ref Range    POCT Glucose 122 (H) 74 - 99 mg/dL   POCT GLUCOSE   Result Value Ref Range    POCT Glucose 238 (H) 74 - 99 mg/dL   Renal function panel   Result Value Ref Range    Glucose 210 (H) 74 - 99 mg/dL    Sodium 135 (L) 136 - 145 mmol/L    Potassium 4.2 3.5 - 5.3 mmol/L    Chloride 96 (L) 98 - 107 mmol/L    Bicarbonate 30 21 - 32 mmol/L    Anion Gap 13 10 - 20 mmol/L    Urea Nitrogen 14 6 - 23 mg/dL    Creatinine 4.59 (H) 0.50 - 1.30 mg/dL    eGFR 13 (L) >60 mL/min/1.73m*2    Calcium 8.3 (L) 8.6 - 10.6 mg/dL    Phosphorus 2.2 (L) 2.5 - 4.9 mg/dL    Albumin 3.0 (L) 3.4 - 5.0 g/dL   Magnesium   Result Value Ref Range    Magnesium 1.91 1.60 - 2.40 mg/dL   POCT GLUCOSE   Result Value Ref Range    POCT Glucose 228 (H) 74 - 99 mg/dL   POCT GLUCOSE   Result Value Ref Range    POCT Glucose 219 (H) 74 - 99 mg/dL   CBC and Auto Differential   Result Value Ref Range    WBC 4.4 4.4 - 11.3 x10*3/uL    nRBC 0.0 0.0 - 0.0 /100 WBCs    RBC 2.63 (L) 4.50 - 5.90 x10*6/uL    Hemoglobin 7.1 (L) 13.5 - 17.5 g/dL    Hematocrit 21.0 (L) 41.0 - 52.0 %    MCV 80 80 - 100 fL    MCH 27.0 26.0 - 34.0 pg    MCHC 33.8 32.0 - 36.0 g/dL    RDW 12.5 11.5 - 14.5 %    Platelets 119 (L) 150 - 450 x10*3/uL    Neutrophils % 69.0 40.0 - 80.0 %    Immature Granulocytes %, Automated 0.5 0.0 - 0.9 %    Lymphocytes % 14.2 13.0 - 44.0 %    Monocytes % 10.1 2.0 - 10.0 %    Eosinophils % 5.7 0.0 - 6.0 %    Basophils % 0.5 0.0 - 2.0 %    Neutrophils Absolute 3.01 1.20 - 7.70 x10*3/uL    Immature Granulocytes Absolute, Automated 0.02 0.00 - 0.70 x10*3/uL    Lymphocytes Absolute 0.62 (L) 1.20 - 4.80 x10*3/uL    Monocytes Absolute 0.44 0.10 - 1.00 x10*3/uL    Eosinophils Absolute 0.25 0.00 - 0.70 x10*3/uL     Basophils Absolute 0.02 0.00 - 0.10 x10*3/uL   Magnesium   Result Value Ref Range    Magnesium 1.92 1.60 - 2.40 mg/dL   Renal function panel   Result Value Ref Range    Glucose 124 (H) 74 - 99 mg/dL    Sodium 137 136 - 145 mmol/L    Potassium 3.8 3.5 - 5.3 mmol/L    Chloride 97 (L) 98 - 107 mmol/L    Bicarbonate 30 21 - 32 mmol/L    Anion Gap 14 10 - 20 mmol/L    Urea Nitrogen 18 6 - 23 mg/dL    Creatinine 6.04 (H) 0.50 - 1.30 mg/dL    eGFR 9 (L) >60 mL/min/1.73m*2    Calcium 8.5 (L) 8.6 - 10.6 mg/dL    Phosphorus 2.7 2.5 - 4.9 mg/dL    Albumin 3.0 (L) 3.4 - 5.0 g/dL   Procalcitonin   Result Value Ref Range    Procalcitonin 0.86 (H) <=0.07 ng/mL   POCT GLUCOSE   Result Value Ref Range    POCT Glucose 123 (H) 74 - 99 mg/dL     *Note: Due to a large number of results and/or encounters for the requested time period, some results have not been displayed. A complete set of results can be found in Results Review.       Electrocardiogram, 12-lead PRN ACS symptoms  Result Date: 8/26/2024  Sinus rhythm with frequent Premature ventricular complexes Nonspecific intraventricular block Cannot rule out Anterior infarct , age undetermined Abnormal ECG When compared with ECG of 24-AUG-2024 12:11, Premature ventricular complexes are now Present QRS axis Shifted left Nonspecific T wave abnormality no longer evident in Lateral leads    ECG 12 lead  Result Date: 8/24/2024  Normal sinus rhythm Nonspecific intraventricular block Minimal voltage criteria for LVH, may be normal variant ( Dolores product ) Abnormal ECG When compared with ECG of 26-JUN-2024 08:26, No significant change was found See ED provider note for full interpretation and clinical correlation Confirmed by Faustina Workman ([id number]) on 8/24/2024 9:19:13 PM    CT abdomen pelvis wo IV contrast  Result Date: 8/24/2024  FINDINGS: Please note that the evaluation of vessels, lymph nodes and organs is limited without intravenous contrast.   LOWER CHEST: Moderate right pleural  effusion. Small left pleural effusion. Mild adjacent lung atelectasis is noted. A few ground-glass opacities noted in the bilateral lower lobes which can be seen in the setting of pulmonary edema.. The heart is enlarged.  No evidence of pericardial effusion.   ABDOMEN:   LIVER: The liver is normal in size without evidence of focal lesions.   BILE DUCTS: The intrahepatic and extrahepatic ducts are not dilated.   GALLBLADDER: The gallbladder is not definitively visualized and may be decompressed versus surgically absent.   PANCREAS: The pancreas appears unremarkable without evidence of ductal dilatation or masses.   SPLEEN: The spleen is normal in size with no evidence of focal lesions.   ADRENAL GLANDS: Bilateral adrenal glands appear normal.   KIDNEYS AND URETERS: The bilateral kidneys are atrophic. Left iliac fossa renal transplant without associated perinephric fluid collection or hydroureteronephrosis..   PELVIS:   BLADDER: The urinary bladder is decompressed, limited for evaluation. There is mild bladder wall thickening, similar to prior, and suspected to be secondary to bladder decompression.   REPRODUCTIVE ORGANS: The prostate is not enlarged.   BOWEL: The stomach is unremarkable. The small bowel is normal in caliber without evidence of wall thickening throughout. Status post partial colectomy. The large bowel is otherwise normal in caliber and demonstrates no abnormal wall thickening. The appendix is not definitely visualized. There is however no pericecal stranding or fluid.   VESSELS: Diffuse vascular calcifications throughout the abdomen. The aorta and IVC otherwise unremarkable.   PERITONEUM/RETROPERITONEUM/LYMPH NODES: There is no free or loculated fluid collection, no free intraperitoneal air. The retroperitoneum appears normal.  No abdominopelvic lymphadenopathy is present.   ABDOMINAL WALL: The abdominal wall soft tissues appear normal.   BONES: No suspicious osseous lesions are identified.  Degenerative discogenic disease is noted in the lower thoracic and lumbar spine. Mild compression deformity of the T10 vertebral body is likely degenerative.     1.  Bilateral pleural effusions right-greater-than-left with the ground-glass opacities in the bilateral lower lobes. Findings can be seen with pulmonary edema.. 2. No acute pathology within the abdomen or pelvis to explain right-sided abdominal pain.      XR chest 1 view  Result Date: 8/24/2024  FINDINGS: Sizable area of right basilar consolidation consistent with pneumonia.   There is increased generalized prominence of the perihilar and basilar interstitium and a superimposed component of edema suggested     Sizable area of right basilar consolidation consistent with pneumonia.   There is increased generalized prominence of the perihilar and basilar interstitium and a superimposed component of edema suggested                         Assessment/Plan   Assessment & Plan  Pneumonia symptoms    ESRD (end stage renal disease) on dialysis (Multi)    Manolo Bashir is a 69 y.o. male with PMHx of ESRD (s/p renal transplant x2), prostate cancer (s/p radical prostatectomy), T2DM, MGUS, HTN who presented with features suggestive of pneumonia and gastroparesis.      Updates 08/27/24:  - iHD yesterday  - Labile blood pressure - restarting hydralazine @ 50 mg TID, will consider restarting nifedipine at lower dose   - Diarrhea stable   - Continue Zosyn for pneumonia, dc IV Vanc  - Continue PO Vanc for C. Diff   - Continue to hold Reglan; continue Erythromycin for gastroparesis.     #Pneumonia  :: Has had multiple hospital admissions for pneumonia.  :: CXR 08/24 demonstrated right basilar consolidation; CT AP w/o contrast shows bilateral plural effusion and right basilar consolidation.  :: Started on cefepime and azithromycin in the ED; now on PO Vanc. and IV Zosyn.   - Dc'd cefepime 8/25 d/t increased risk of neurotoxicity iso ESRD   - Completed 3 day course of  azithromycin on 8/26  :: Urine Strept & Legionella antigens negative  :: Pro-calcitonin elevated at 0.25 (8/25), 0.86 (8/28)  :: MRSA probe negative  :: Blood cultures    -8/24 - NGTD   -8/25 - NGTD  :: Thoracentesis (8/26) w/ IR  - Lights Criteria by LDH and protein  - Culture data pending  - Repeat pro-calcitonin tomorrow   - Discontinue IV Vanc (8/26-8/28)  - Continue Zosyn (started 8/25),     #Chest pain   ::Non-radiating left sided dull chest pain  :: troponin 8/27 wnl  :: last stress test 2/2021 unremarkable  - repeat EKG stable  - continuous telemetry refused by patient despite patient education  - outpatient stress test     #Diarrhea  :: Chronic, waxes and wanes; dates back to AUS placement in Feb. 2023 that got infected.  :: H/o right hemicolectomy (2017).  :: Has been controlled with Loperamide.  :: Colonoscopy on 8/2023 - No polyps found. Repeat surveillance in 7 yrs (2030).   :: C. Diff PCR (+), EIA (-), currently receiving PO Vanc for decolonization    - Hold Loperamide.  -stool pathogen panel negative       #Nausea & Vomiting  :: Has gastroparesis; confirmed with [facility] 04/24.  :: Has been on Reglan 5mg bid (stopped recently), ProtoNix 40mg dly.  - Hold Reglan.  - Continue ProtoNix 40mg dly.  - Continue Erythromycin susp. 200mg tid     #ESRD s/p renal transplant (X2)  #Hyponatremia, resolved  :: Renal transplants in 1992 and 2013;  :: Now with impaired allograft function currently on immunosuppression (on Tacrolimus and Prednisone).  :: Baseline Cr 2.5 - 3, makes urine  :: On [facility] iHD schedule; last session (08/24/24)  :: Transplant nephrology has been following and are aware of the current admission.   - Recommending stopping tacrolimus    - Continue prednisone 5 mg  :: Nephrology following, appreciate recs   - Continue iHD schedule of [facility]  - Hold Bumex and Tacrolimus.  - Continue Prednisone 5 mg  - Strict I&Os  - Follow daily RFP  - F/up serum osm, urine osm, urine electrolytes      #Asymptomatic  bacteriuria    :: UA (8/25) with 1+ bacteria, proteinuria, otherwise unremarkable  - No need for further antibiotics, will follow if symptomatic    #Chronic anemia, stable  :: Likely 2/2 to ESRD as above  :: Baseline Hgb ~7-9  - Epogen/procrit on dialysis days per nephrology   - Continue to monitor with daily CBC    #T2DM  :: Last A1C 6.6 (7/10/24).  :: Home regimen: insulin glargine 20 units daily and SSI.  :: Patient not eating as usual 2/2 to gastroparesis as above.  - Continue basal Lantus to 10 units, will increase dose based on the AM BG as needed.   - Continue on SSI #2.     #Chronic HTN  :: H/o multiple prior admissions for hypervolemia and hypertensive urgency.   :: Has been on (Hydralazine 100 mg TID, carvedilol 37.5 mg, BID, Nifedipine 60 mg daily, imdur 60 mg nightly, Bumex 2 mg QID on non iHD days).  :: /79 on presentation, continues to be labile   - Continue home Coreg and Imdur.  - Restart hydralazine 50 mg TID  - Currently holding home Nifedipine, but will resume pending BP after resuming Hydralazine today  - Hold Bumex.     #Chronic lower back pain  - Encourage patient to be out of bed to chair 3 times daily with meals.  - Tylenol 975 mg q8h   - Continue oxycodone prn  - Outpatient follow up      ---------------------------------------------------------------------------  Fluids: PRN  Electrolytes: PRN  Nutrition: Adult diet Renal; Potassium Restricted 2 gm (50mEq); 2 - 3 grams Sodium  Access: PIV     DVT prophylaxis: SQH, SCD  GI Ppx: Pantoprazole      Abx: Zosyn, PO Vancomycin, Erythromycin   O2: RA     Pain regimen: Scheduled Tylenol, Oxy 5 prn  GI Laxative: None     Code Status: Full Code (Confirmed on admission)   Emergency Contact / NOK: Extended Emergency Contact Information  Primary Emergency Contact: KETTY PLASENCIA  Address: [address] [place] [address] [place]  Home Phone: [phone]  Work Phone: [phone]  Mobile Phone:  [phone]  Relation: Friend  Preferred language: English  Secondary Emergency Contact: CLEMENTE ROB  Address: [address] [place] [address] [facility]  Home Phone: [phone]  Mobile Phone: [phone]  Relation: Daughter           Kei Luther DO  PGY-1 Internal Medicine

## 2024-08-28 NOTE — PROGRESS NOTES
"Manolo Bashir is a 69 y.o. male on day 4 of admission presenting with Pneumonia symptoms.    Subjective   Pt resting in bed. Denies sob, denies, fever, chills, pain, constipation, stated had emesis after breakfast , cont with nausea, chest is sore from hard dry coughing (does not have true chest pains)       Objective     Physical Exam  Vitals and nursing note reviewed.   Cardiovascular:      Comments: SR 90  8/26-S/p thoracentesis with 850cc return  Pulmonary:      Comments: Jeffrey lung sounds dim  Abdominal:      General: There is distension.      Comments: Sore to palpation   Genitourinary:     Comments: States make little urine  Musculoskeletal:      Comments: Ble without edema   Skin:     General: Skin is warm and dry.   Neurological:      Mental Status: He is alert and oriented to person, place, and time.   Psychiatric:         Mood and Affect: Mood normal.         Behavior: Behavior normal.         Last Recorded Vitals  Blood pressure 174/79, pulse 66, temperature 36.4 °C (97.5 °F), temperature source Temporal, resp. rate 15, height 1.727 m (5' 8\"), weight 72.6 kg (160 lb), SpO2 99%.  Intake/Output last 3 Shifts:  I/O last 3 completed shifts:  In: 2350 (32.4 mL/kg) [P.O.:100; I.V.:800 (11 mL/kg); Other:800; IV Piggyback:650]  Out: 2800 (38.6 mL/kg) [Other:2800]  Weight: 72.6 kg     Relevant Results  Scheduled medications  acetaminophen, 975 mg, oral, q8h  amoxicillin-pot clavulanate, 1 tablet, oral, q24h  aspirin, 81 mg, oral, Daily  B complex-vitamin C-folic acid, 1 capsule, oral, Daily  [Held by provider] bumetanide, 2 mg, oral, Once per day on Sunday Tuesday Thursday Saturday  calcitriol, 0.5 mcg, oral, Daily  carvedilol, 37.5 mg, oral, BID  cinacalcet, 30 mg, oral, Daily  [START ON 8/29/2024] epoetin aida or biosimilar, 20,000 Units, intravenous, Once per day on Tuesday Thursday Saturday  erythromycin ethylsuccinate, 200 mg, oral, TID AC  heparin (porcine), 5,000 Units, subcutaneous, q8h  hydrALAZINE, 50 " mg, oral, TID  insulin glargine, 10 Units, subcutaneous, Nightly  insulin lispro, 0-10 Units, subcutaneous, TID  isosorbide mononitrate ER, 60 mg, oral, Daily  lidocaine, 1 patch, transdermal, Daily  [Held by provider] NIFEdipine ER, 60 mg, oral, BID  pantoprazole, 40 mg, oral, Daily before breakfast  polyethylene glycol, 17 g, oral, Daily  pravastatin, 10 mg, oral, Nightly  predniSONE, 5 mg, oral, q AM  sennosides, 1 tablet, oral, Nightly  vancomycin, 125 mg, oral, 4x daily      Continuous medications     PRN medications  PRN medications: dextrose, dextrose, glucagon, glucagon, oxyCODONE   Results for orders placed or performed during the hospital encounter of 08/24/24 (from the past 24 hour(s))   POCT GLUCOSE   Result Value Ref Range    POCT Glucose 238 (H) 74 - 99 mg/dL   Renal function panel   Result Value Ref Range    Glucose 210 (H) 74 - 99 mg/dL    Sodium 135 (L) 136 - 145 mmol/L    Potassium 4.2 3.5 - 5.3 mmol/L    Chloride 96 (L) 98 - 107 mmol/L    Bicarbonate 30 21 - 32 mmol/L    Anion Gap 13 10 - 20 mmol/L    Urea Nitrogen 14 6 - 23 mg/dL    Creatinine 4.59 (H) 0.50 - 1.30 mg/dL    eGFR 13 (L) >60 mL/min/1.73m*2    Calcium 8.3 (L) 8.6 - 10.6 mg/dL    Phosphorus 2.2 (L) 2.5 - 4.9 mg/dL    Albumin 3.0 (L) 3.4 - 5.0 g/dL   Magnesium   Result Value Ref Range    Magnesium 1.91 1.60 - 2.40 mg/dL   POCT GLUCOSE   Result Value Ref Range    POCT Glucose 228 (H) 74 - 99 mg/dL   POCT GLUCOSE   Result Value Ref Range    POCT Glucose 219 (H) 74 - 99 mg/dL   CBC and Auto Differential   Result Value Ref Range    WBC 4.4 4.4 - 11.3 x10*3/uL    nRBC 0.0 0.0 - 0.0 /100 WBCs    RBC 2.63 (L) 4.50 - 5.90 x10*6/uL    Hemoglobin 7.1 (L) 13.5 - 17.5 g/dL    Hematocrit 21.0 (L) 41.0 - 52.0 %    MCV 80 80 - 100 fL    MCH 27.0 26.0 - 34.0 pg    MCHC 33.8 32.0 - 36.0 g/dL    RDW 12.5 11.5 - 14.5 %    Platelets 119 (L) 150 - 450 x10*3/uL    Neutrophils % 69.0 40.0 - 80.0 %    Immature Granulocytes %, Automated 0.5 0.0 - 0.9 %     Lymphocytes % 14.2 13.0 - 44.0 %    Monocytes % 10.1 2.0 - 10.0 %    Eosinophils % 5.7 0.0 - 6.0 %    Basophils % 0.5 0.0 - 2.0 %    Neutrophils Absolute 3.01 1.20 - 7.70 x10*3/uL    Immature Granulocytes Absolute, Automated 0.02 0.00 - 0.70 x10*3/uL    Lymphocytes Absolute 0.62 (L) 1.20 - 4.80 x10*3/uL    Monocytes Absolute 0.44 0.10 - 1.00 x10*3/uL    Eosinophils Absolute 0.25 0.00 - 0.70 x10*3/uL    Basophils Absolute 0.02 0.00 - 0.10 x10*3/uL   Magnesium   Result Value Ref Range    Magnesium 1.92 1.60 - 2.40 mg/dL   Renal function panel   Result Value Ref Range    Glucose 124 (H) 74 - 99 mg/dL    Sodium 137 136 - 145 mmol/L    Potassium 3.8 3.5 - 5.3 mmol/L    Chloride 97 (L) 98 - 107 mmol/L    Bicarbonate 30 21 - 32 mmol/L    Anion Gap 14 10 - 20 mmol/L    Urea Nitrogen 18 6 - 23 mg/dL    Creatinine 6.04 (H) 0.50 - 1.30 mg/dL    eGFR 9 (L) >60 mL/min/1.73m*2    Calcium 8.5 (L) 8.6 - 10.6 mg/dL    Phosphorus 2.7 2.5 - 4.9 mg/dL    Albumin 3.0 (L) 3.4 - 5.0 g/dL   Procalcitonin   Result Value Ref Range    Procalcitonin 0.86 (H) <=0.07 ng/mL   POCT GLUCOSE   Result Value Ref Range    POCT Glucose 123 (H) 74 - 99 mg/dL     *Note: Due to a large number of results and/or encounters for the requested time period, some results have not been displayed. A complete set of results can be found in Results Review.                       Assessment/Plan   Assessment & Plan  Pneumonia symptoms    ESRD (end stage renal disease) on dialysis (Multi)    Tolerated hemodialysis yesterday with net fluid loss 1L    Bp stable with tx, euvolemic on exam and has stable electrolytes . K+=3.8     Outpatient Dialysis schedule:  TTS [facility] Galan/ Dr Bridges      Access: lt fist with +thrill/bruit- no issues - able to achieve      Anemia of ESRD: epoetin aida (Epogen,Procrit) injection 20,000 Units on dialysis days.. current hgb 7.1.. will cont to monitor..  (Mircera 150mcg q2wks op)      CKD-MBD Phosphate Binder:B complex-vitamin  C-folic acid (Nephrocaps) capsule 1 capsule daily, calcitriol (Rocaltrol) capsule 0.5 mcg daily, cinacalcet (Sensipar) tablet 30 mg daily     Plan HD tomorrow with UF as tolerated     Renal diet      Please obtain daily standing wt (if possible)     Medication to be adjusted for ESRD      Patient to continue regular HD schedule while inpatient and to follow with the outpatient nephrologist at discharge        ANÍBAL Paredes-CNP

## 2024-08-28 NOTE — CARE PLAN
The patient's goals for the shift include Patient will tolerate diet with no c/o of N/V Diarrhea this shift    The clinical goals for the shift include Patient's will be free from fall / injury during the shift.    Over the shift, the patient did not make progress toward the following goals. Barriers to progression include . Recommendations to address these barriers include .

## 2024-08-28 NOTE — CARE PLAN
The patient's goals for the shift include Patient will tolerate diet with no c/o of N/V Diarrhea this shift    The clinical goals for the shift include Patient's will be free from fall / injury during the shift.    Problem: Fall/Injury  Goal: Not fall by end of shift  Outcome: Progressing  Goal: Be free from injury by end of the shift  Outcome: Progressing     Problem: Pain - Adult  Goal: Verbalizes/displays adequate comfort level or baseline comfort level  Outcome: Progressing     Problem: Chronic Conditions and Co-morbidities  Goal: Patient's chronic conditions and co-morbidity symptoms are monitored and maintained or improved  Outcome: Progressing

## 2024-08-28 NOTE — PROGRESS NOTES
Physical Therapy                 Therapy Communication Note    Patient Name: Manolo Bashir  MRN: [id number]  Today's Date: 8/28/2024     Discipline: Physical Therapy    Missed Visit Reason:      Missed Time: Attempt    Comment: pt declining secondary to nausea and recently returning from chair

## 2024-08-28 NOTE — PROGRESS NOTES
Vancomycin Dosing by Pharmacy- Cessation of Therapy    Consult to pharmacy for vancomycin dosing has been discontinued by the prescriber, pharmacy will sign off at this time.    Please call pharmacy if there are further questions or re-enter a consult if vancomycin is resumed.     Randi Austin, PharmD

## 2024-08-28 NOTE — DISCHARGE INSTRUCTIONS
Dear Manolo Bashir,     You were admitted due to nausea, vomiting, diarrhea, and pneumonia. You were treated with IV antibiotics, along with by mouth antibiotics for an infection called C.diff.     Please continue 5 more days of by mouth vancomycin.  Please also continue 10 mg of prednisone for the next 8 days, and then go back to your home dose of 5 mg daily. We also would like you to start your tacrolimus back at 0.5 mg twice a day.  You can continue taking your Reglan medicine - if you are noticing new or continued odd movements of the mouth or face or body, stop the medicine and let your PCP know.     If you experience new/worsening chest pain, worsening nausea/vomiting, diarrhea please go to the nearest ED.     Please see the attached pages for a complete list of your medications.     Follow up:  We have requested the following appointments for you. If you do not receive a call in 3-5 days please call to book your appointments.   - PCP  - Transplant nephrology     Please see the attached pages for your already scheduled appointments.     It was a pleasure being part of your care.  [place]

## 2024-08-29 ENCOUNTER — APPOINTMENT (OUTPATIENT)
Dept: RADIOLOGY | Facility: HOSPITAL | Age: 69
End: 2024-08-29
Payer: COMMERCIAL

## 2024-08-29 ENCOUNTER — APPOINTMENT (OUTPATIENT)
Dept: DIALYSIS | Facility: HOSPITAL | Age: 69
End: 2024-08-29
Payer: COMMERCIAL

## 2024-08-29 LAB
ALBUMIN SERPL BCP-MCNC: 3.1 G/DL (ref 3.4–5)
ANION GAP SERPL CALC-SCNC: 13 MMOL/L (ref 10–20)
BACTERIA BLD CULT: NORMAL
BACTERIA BLD CULT: NORMAL
BACTERIA FLD CULT: NORMAL
BASOPHILS # BLD AUTO: 0.02 X10*3/UL (ref 0–0.1)
BASOPHILS NFR BLD AUTO: 0.5 %
BUN SERPL-MCNC: 21 MG/DL (ref 6–23)
CALCIUM SERPL-MCNC: 9.1 MG/DL (ref 8.6–10.6)
CHLORIDE SERPL-SCNC: 96 MMOL/L (ref 98–107)
CO2 SERPL-SCNC: 28 MMOL/L (ref 21–32)
CREAT SERPL-MCNC: 7.9 MG/DL (ref 0.5–1.3)
EGFRCR SERPLBLD CKD-EPI 2021: 7 ML/MIN/1.73M*2
EOSINOPHIL # BLD AUTO: 0.07 X10*3/UL (ref 0–0.7)
EOSINOPHIL NFR BLD AUTO: 1.8 %
ERYTHROCYTE [DISTWIDTH] IN BLOOD BY AUTOMATED COUNT: 12.9 % (ref 11.5–14.5)
GLUCOSE BLD MANUAL STRIP-MCNC: 206 MG/DL (ref 74–99)
GLUCOSE BLD MANUAL STRIP-MCNC: 211 MG/DL (ref 74–99)
GLUCOSE BLD MANUAL STRIP-MCNC: 86 MG/DL (ref 74–99)
GLUCOSE SERPL-MCNC: 202 MG/DL (ref 74–99)
GRAM STN SPEC: NORMAL
GRAM STN SPEC: NORMAL
HCT VFR BLD AUTO: 25.7 % (ref 41–52)
HGB BLD-MCNC: 8.1 G/DL (ref 13.5–17.5)
IMM GRANULOCYTES # BLD AUTO: 0.03 X10*3/UL (ref 0–0.7)
IMM GRANULOCYTES NFR BLD AUTO: 0.8 % (ref 0–0.9)
LYMPHOCYTES # BLD AUTO: 0.5 X10*3/UL (ref 1.2–4.8)
LYMPHOCYTES NFR BLD AUTO: 12.8 %
MAGNESIUM SERPL-MCNC: 1.89 MG/DL (ref 1.6–2.4)
MCH RBC QN AUTO: 26.7 PG (ref 26–34)
MCHC RBC AUTO-ENTMCNC: 31.5 G/DL (ref 32–36)
MCV RBC AUTO: 85 FL (ref 80–100)
MONOCYTES # BLD AUTO: 0.24 X10*3/UL (ref 0.1–1)
MONOCYTES NFR BLD AUTO: 6.1 %
NEUTROPHILS # BLD AUTO: 3.06 X10*3/UL (ref 1.2–7.7)
NEUTROPHILS NFR BLD AUTO: 78 %
NRBC BLD-RTO: 0 /100 WBCS (ref 0–0)
PHOSPHATE SERPL-MCNC: 3.8 MG/DL (ref 2.5–4.9)
PLATELET # BLD AUTO: 134 X10*3/UL (ref 150–450)
POTASSIUM SERPL-SCNC: 4.1 MMOL/L (ref 3.5–5.3)
RBC # BLD AUTO: 3.03 X10*6/UL (ref 4.5–5.9)
SODIUM SERPL-SCNC: 133 MMOL/L (ref 136–145)
WBC # BLD AUTO: 3.9 X10*3/UL (ref 4.4–11.3)

## 2024-08-29 PROCEDURE — 83735 ASSAY OF MAGNESIUM: CPT

## 2024-08-29 PROCEDURE — 85025 COMPLETE CBC W/AUTO DIFF WBC: CPT

## 2024-08-29 PROCEDURE — 8010000001 HC DIALYSIS - HEMODIALYSIS PER DAY

## 2024-08-29 PROCEDURE — 1200000002 HC GENERAL ROOM WITH TELEMETRY DAILY

## 2024-08-29 PROCEDURE — 2500000002 HC RX 250 W HCPCS SELF ADMINISTERED DRUGS (ALT 637 FOR MEDICARE OP, ALT 636 FOR OP/ED)

## 2024-08-29 PROCEDURE — 99232 SBSQ HOSP IP/OBS MODERATE 35: CPT

## 2024-08-29 PROCEDURE — 6350000001 HC RX 635 EPOETIN >10,000 UNITS: Performed by: NURSE PRACTITIONER

## 2024-08-29 PROCEDURE — 82947 ASSAY GLUCOSE BLOOD QUANT: CPT

## 2024-08-29 PROCEDURE — 2500000004 HC RX 250 GENERAL PHARMACY W/ HCPCS (ALT 636 FOR OP/ED)

## 2024-08-29 PROCEDURE — 80069 RENAL FUNCTION PANEL: CPT

## 2024-08-29 PROCEDURE — 2500000005 HC RX 250 GENERAL PHARMACY W/O HCPCS

## 2024-08-29 PROCEDURE — 2500000001 HC RX 250 WO HCPCS SELF ADMINISTERED DRUGS (ALT 637 FOR MEDICARE OP)

## 2024-08-29 PROCEDURE — 2500000001 HC RX 250 WO HCPCS SELF ADMINISTERED DRUGS (ALT 637 FOR MEDICARE OP): Performed by: STUDENT IN AN ORGANIZED HEALTH CARE EDUCATION/TRAINING PROGRAM

## 2024-08-29 PROCEDURE — 76776 US EXAM K TRANSPL W/DOPPLER: CPT

## 2024-08-29 PROCEDURE — 76776 US EXAM K TRANSPL W/DOPPLER: CPT | Performed by: RADIOLOGY

## 2024-08-29 PROCEDURE — 2500000004 HC RX 250 GENERAL PHARMACY W/ HCPCS (ALT 636 FOR OP/ED): Performed by: STUDENT IN AN ORGANIZED HEALTH CARE EDUCATION/TRAINING PROGRAM

## 2024-08-29 PROCEDURE — 36415 COLL VENOUS BLD VENIPUNCTURE: CPT

## 2024-08-29 RX ORDER — OXYCODONE HYDROCHLORIDE 5 MG/1
5 TABLET ORAL ONCE
Status: COMPLETED | OUTPATIENT
Start: 2024-08-29 | End: 2024-08-29

## 2024-08-29 RX ORDER — NIFEDIPINE 30 MG/1
30 TABLET, FILM COATED, EXTENDED RELEASE ORAL 2 TIMES DAILY
Status: DISCONTINUED | OUTPATIENT
Start: 2024-08-29 | End: 2024-08-29

## 2024-08-29 RX ORDER — NIFEDIPINE 30 MG/1
30 TABLET, FILM COATED, EXTENDED RELEASE ORAL
Status: DISCONTINUED | OUTPATIENT
Start: 2024-08-30 | End: 2024-08-30

## 2024-08-29 RX ORDER — METOCLOPRAMIDE HYDROCHLORIDE 5 MG/ML
5 INJECTION INTRAMUSCULAR; INTRAVENOUS 2 TIMES DAILY
Status: DISCONTINUED | OUTPATIENT
Start: 2024-08-29 | End: 2024-08-31 | Stop reason: HOSPADM

## 2024-08-29 RX ORDER — METOCLOPRAMIDE 5 MG/1
5 TABLET ORAL 2 TIMES DAILY
Status: DISCONTINUED | OUTPATIENT
Start: 2024-08-29 | End: 2024-08-31 | Stop reason: HOSPADM

## 2024-08-29 RX ORDER — METOCLOPRAMIDE 5 MG/1
5 TABLET ORAL 2 TIMES DAILY
Status: DISCONTINUED | OUTPATIENT
Start: 2024-08-29 | End: 2024-08-29

## 2024-08-29 RX ORDER — AMOXICILLIN AND CLAVULANATE POTASSIUM 500; 125 MG/1; MG/1
1 TABLET, FILM COATED ORAL EVERY 24 HOURS
Status: DISCONTINUED | OUTPATIENT
Start: 2024-08-30 | End: 2024-08-29

## 2024-08-29 RX ORDER — HYDRALAZINE HYDROCHLORIDE 20 MG/ML
20 INJECTION INTRAMUSCULAR; INTRAVENOUS EVERY 4 HOURS PRN
Status: DISCONTINUED | OUTPATIENT
Start: 2024-08-29 | End: 2024-08-30

## 2024-08-29 RX ADMIN — HYDRALAZINE HYDROCHLORIDE 10 MG: 20 INJECTION INTRAMUSCULAR; INTRAVENOUS at 06:14

## 2024-08-29 RX ADMIN — DEXAMETHASONE SODIUM PHOSPHATE 1.6 MG: 4 INJECTION, SOLUTION INTRA-ARTICULAR; INTRALESIONAL; INTRAMUSCULAR; INTRAVENOUS; SOFT TISSUE at 20:26

## 2024-08-29 RX ADMIN — CINACALCET 30 MG: 30 TABLET, FILM COATED ORAL at 08:26

## 2024-08-29 RX ADMIN — PANTOPRAZOLE SODIUM 40 MG: 40 TABLET, DELAYED RELEASE ORAL at 06:14

## 2024-08-29 RX ADMIN — INSULIN GLARGINE 5 UNITS: 100 INJECTION, SOLUTION SUBCUTANEOUS at 21:48

## 2024-08-29 RX ADMIN — EPOETIN ALFA 20000 UNITS: 20000 SOLUTION INTRAVENOUS; SUBCUTANEOUS at 21:46

## 2024-08-29 RX ADMIN — METOCLOPRAMIDE 5 MG: 5 TABLET ORAL at 20:25

## 2024-08-29 RX ADMIN — ISOSORBIDE MONONITRATE 60 MG: 60 TABLET, EXTENDED RELEASE ORAL at 08:26

## 2024-08-29 RX ADMIN — TACROLIMUS 0.5 MG: 0.5 CAPSULE ORAL at 06:14

## 2024-08-29 RX ADMIN — ASPIRIN 81 MG 81 MG: 81 TABLET ORAL at 08:26

## 2024-08-29 RX ADMIN — HEPARIN SODIUM 5000 UNITS: 5000 INJECTION INTRAVENOUS; SUBCUTANEOUS at 06:15

## 2024-08-29 RX ADMIN — CALCITRIOL 0.5 MCG: 0.5 CAPSULE ORAL at 08:27

## 2024-08-29 RX ADMIN — ACETAMINOPHEN 975 MG: 325 TABLET ORAL at 06:14

## 2024-08-29 RX ADMIN — HEPARIN SODIUM 5000 UNITS: 5000 INJECTION INTRAVENOUS; SUBCUTANEOUS at 20:28

## 2024-08-29 RX ADMIN — PRAVASTATIN SODIUM 10 MG: 20 TABLET ORAL at 20:22

## 2024-08-29 RX ADMIN — CARVEDILOL 37.5 MG: 25 TABLET, FILM COATED ORAL at 20:23

## 2024-08-29 RX ADMIN — ACETAMINOPHEN 975 MG: 325 TABLET ORAL at 21:47

## 2024-08-29 RX ADMIN — TACROLIMUS 0.5 MG: 0.5 CAPSULE ORAL at 20:22

## 2024-08-29 RX ADMIN — INSULIN LISPRO 4 UNITS: 100 INJECTION, SOLUTION INTRAVENOUS; SUBCUTANEOUS at 08:36

## 2024-08-29 RX ADMIN — VANCOMYCIN HYDROCHLORIDE 125 MG: 125 CAPSULE ORAL at 06:14

## 2024-08-29 RX ADMIN — LIDOCAINE 1 PATCH: 4 PATCH TOPICAL at 08:25

## 2024-08-29 RX ADMIN — CARVEDILOL 37.5 MG: 25 TABLET, FILM COATED ORAL at 08:26

## 2024-08-29 RX ADMIN — VANCOMYCIN HYDROCHLORIDE 125 MG: 125 CAPSULE ORAL at 20:23

## 2024-08-29 RX ADMIN — OXYCODONE HYDROCHLORIDE 5 MG: 5 TABLET ORAL at 02:03

## 2024-08-29 RX ADMIN — HYDRALAZINE HYDROCHLORIDE 10 MG: 20 INJECTION INTRAMUSCULAR; INTRAVENOUS at 01:38

## 2024-08-29 RX ADMIN — ASCORBIC ACID, THIAMINE MONONITRATE,RIBOFLAVIN, NIACINAMIDE, PYRIDOXINE HYDROCHLORIDE, FOLIC ACID, CYANOCOBALAMIN, BIOTIN, CALCIUM PANTOTHENATE, 1 CAPSULE: 100; 1.5; 1.7; 20; 10; 1; 6000; 150000; 5 CAPSULE, LIQUID FILLED ORAL at 08:26

## 2024-08-29 ASSESSMENT — PAIN SCALES - GENERAL
PAINLEVEL_OUTOF10: 7
PAINLEVEL_OUTOF10: 5 - MODERATE PAIN

## 2024-08-29 NOTE — PROGRESS NOTES
"Manolo Bashir is a 69 y.o. male on day 5 of admission presenting with Pneumonia symptoms.    Subjective   Patient denies headache, chest pain, abdominal pain, back pain this morning. Denies nausea or vomiting over night and currently. Last BM 8/27.       Objective     Physical Exam  Constitutional:       General: He is not in acute distress.     Comments: Somnolent, but able to awaken when spoken to.    HENT:      Head: Normocephalic and atraumatic.      Mouth/Throat:      Mouth: Mucous membranes are moist.      Pharynx: Oropharynx is clear. No oropharyngeal exudate or posterior oropharyngeal erythema.   Eyes:      Extraocular Movements: Extraocular movements intact.   Cardiovascular:      Rate and Rhythm: Normal rate and regular rhythm.      Pulses: Normal pulses.      Heart sounds: Normal heart sounds. No murmur heard.     No gallop.   Pulmonary:      Effort: Pulmonary effort is normal. No respiratory distress.      Breath sounds: Rhonchi and rales (bilateral in the bases) present. No wheezing.   Abdominal:      General: Abdomen is flat. There is no distension.      Palpations: Abdomen is soft.      Tenderness: There is no abdominal tenderness.   Musculoskeletal:         General: Tenderness (to palpation of the lower back) present. No swelling.      Right lower leg: No edema.      Left lower leg: No edema.   Skin:     General: Skin is warm and dry.   Neurological:      General: No focal deficit present.      Mental Status: He is alert and oriented to person, place, and time.         Last Recorded Vitals  Blood pressure (!) 173/118, pulse 58, temperature 37 °C (98.6 °F), resp. rate 19, height 1.727 m (5' 8\"), weight 72.6 kg (160 lb), SpO2 95%.  Intake/Output last 3 Shifts:  I/O last 3 completed shifts:  In: 150 (2.1 mL/kg) [P.O.:100; IV Piggyback:50]  Out: - (0 mL/kg)   Weight: 72.6 kg     Relevant Results  Results for orders placed or performed during the hospital encounter of 08/24/24 (from the past 24 hour(s)) "   POCT GLUCOSE   Result Value Ref Range    POCT Glucose 90 74 - 99 mg/dL   CBC and Auto Differential   Result Value Ref Range    WBC 3.9 (L) 4.4 - 11.3 x10*3/uL    nRBC 0.0 0.0 - 0.0 /100 WBCs    RBC 3.03 (L) 4.50 - 5.90 x10*6/uL    Hemoglobin 8.1 (L) 13.5 - 17.5 g/dL    Hematocrit 25.7 (L) 41.0 - 52.0 %    MCV 85 80 - 100 fL    MCH 26.7 26.0 - 34.0 pg    MCHC 31.5 (L) 32.0 - 36.0 g/dL    RDW 12.9 11.5 - 14.5 %    Platelets 134 (L) 150 - 450 x10*3/uL    Neutrophils % 78.0 40.0 - 80.0 %    Immature Granulocytes %, Automated 0.8 0.0 - 0.9 %    Lymphocytes % 12.8 13.0 - 44.0 %    Monocytes % 6.1 2.0 - 10.0 %    Eosinophils % 1.8 0.0 - 6.0 %    Basophils % 0.5 0.0 - 2.0 %    Neutrophils Absolute 3.06 1.20 - 7.70 x10*3/uL    Immature Granulocytes Absolute, Automated 0.03 0.00 - 0.70 x10*3/uL    Lymphocytes Absolute 0.50 (L) 1.20 - 4.80 x10*3/uL    Monocytes Absolute 0.24 0.10 - 1.00 x10*3/uL    Eosinophils Absolute 0.07 0.00 - 0.70 x10*3/uL    Basophils Absolute 0.02 0.00 - 0.10 x10*3/uL   Magnesium   Result Value Ref Range    Magnesium 1.89 1.60 - 2.40 mg/dL   Renal function panel   Result Value Ref Range    Glucose 202 (H) 74 - 99 mg/dL    Sodium 133 (L) 136 - 145 mmol/L    Potassium 4.1 3.5 - 5.3 mmol/L    Chloride 96 (L) 98 - 107 mmol/L    Bicarbonate 28 21 - 32 mmol/L    Anion Gap 13 10 - 20 mmol/L    Urea Nitrogen 21 6 - 23 mg/dL    Creatinine 7.90 (H) 0.50 - 1.30 mg/dL    eGFR 7 (L) >60 mL/min/1.73m*2    Calcium 9.1 8.6 - 10.6 mg/dL    Phosphorus 3.8 2.5 - 4.9 mg/dL    Albumin 3.1 (L) 3.4 - 5.0 g/dL   POCT GLUCOSE   Result Value Ref Range    POCT Glucose 206 (H) 74 - 99 mg/dL     *Note: Due to a large number of results and/or encounters for the requested time period, some results have not been displayed. A complete set of results can be found in Results Review.       Electrocardiogram, 12-lead PRN ACS symptoms  Result Date: 8/26/2024  Sinus rhythm with frequent Premature ventricular complexes Nonspecific  intraventricular block Cannot rule out Anterior infarct , age undetermined Abnormal ECG When compared with ECG of 24-AUG-2024 12:11, Premature ventricular complexes are now Present QRS axis Shifted left Nonspecific T wave abnormality no longer evident in Lateral leads    ECG 12 lead  Result Date: 8/24/2024  Normal sinus rhythm Nonspecific intraventricular block Minimal voltage criteria for LVH, may be normal variant ( Levar product ) Abnormal ECG When compared with ECG of 26-JUN-2024 08:26, No significant change was found See ED provider note for full interpretation and clinical correlation Confirmed by Faustina Workman ([id number]) on 8/24/2024 9:19:13 PM    CT abdomen pelvis wo IV contrast  Result Date: 8/24/2024  FINDINGS: Please note that the evaluation of vessels, lymph nodes and organs is limited without intravenous contrast.   LOWER CHEST: Moderate right pleural effusion. Small left pleural effusion. Mild adjacent lung atelectasis is noted. A few ground-glass opacities noted in the bilateral lower lobes which can be seen in the setting of pulmonary edema.. The heart is enlarged.  No evidence of pericardial effusion.   ABDOMEN:   LIVER: The liver is normal in size without evidence of focal lesions.   BILE DUCTS: The intrahepatic and extrahepatic ducts are not dilated.   GALLBLADDER: The gallbladder is not definitively visualized and may be decompressed versus surgically absent.   PANCREAS: The pancreas appears unremarkable without evidence of ductal dilatation or masses.   SPLEEN: The spleen is normal in size with no evidence of focal lesions.   ADRENAL GLANDS: Bilateral adrenal glands appear normal.   KIDNEYS AND URETERS: The bilateral kidneys are atrophic. Left iliac fossa renal transplant without associated perinephric fluid collection or hydroureteronephrosis..   PELVIS:   BLADDER: The urinary bladder is decompressed, limited for evaluation. There is mild bladder wall thickening, similar to prior, and suspected  to be secondary to bladder decompression.   REPRODUCTIVE ORGANS: The prostate is not enlarged.   BOWEL: The stomach is unremarkable. The small bowel is normal in caliber without evidence of wall thickening throughout. Status post partial colectomy. The large bowel is otherwise normal in caliber and demonstrates no abnormal wall thickening. The appendix is not definitely visualized. There is however no pericecal stranding or fluid.   VESSELS: Diffuse vascular calcifications throughout the abdomen. The aorta and IVC otherwise unremarkable.   PERITONEUM/RETROPERITONEUM/LYMPH NODES: There is no free or loculated fluid collection, no free intraperitoneal air. The retroperitoneum appears normal.  No abdominopelvic lymphadenopathy is present.   ABDOMINAL WALL: The abdominal wall soft tissues appear normal.   BONES: No suspicious osseous lesions are identified. Degenerative discogenic disease is noted in the lower thoracic and lumbar spine. Mild compression deformity of the T10 vertebral body is likely degenerative.     1.  Bilateral pleural effusions right-greater-than-left with the ground-glass opacities in the bilateral lower lobes. Findings can be seen with pulmonary edema.. 2. No acute pathology within the abdomen or pelvis to explain right-sided abdominal pain.      XR chest 1 view  Result Date: 8/24/2024  FINDINGS: Sizable area of right basilar consolidation consistent with pneumonia.   There is increased generalized prominence of the perihilar and basilar interstitium and a superimposed component of edema suggested     Sizable area of right basilar consolidation consistent with pneumonia.   There is increased generalized prominence of the perihilar and basilar interstitium and a superimposed component of edema suggested                         Assessment/Plan   Assessment & Plan  Pneumonia symptoms    ESRD (end stage renal disease) on dialysis (Multi)    Manolo Bashir is a 69 y.o. male with PMHx of ESRD (s/p renal  transplant x2), prostate cancer (s/p radical prostatectomy), T2DM, MGUS, HTN who presented with features suggestive of pneumonia and gastroparesis.      Updates 08/27/24:  - iHD today  - Renal transplant U/S this morning  - Transitioned erythromycin from PO to IV, switched prednisone 10 mg PO to dexamethasone 1.6 mg IV, added tigan PRN 8/28 afternoon. Continue to hold Reglan  - Labile blood pressure - restarting at lower dose of nifedipine 30 mg daily, continuing carvedilol and imdur, added IV hydralazine 20 mg prn for SBP>160  - 1 dose of Augmentin remaining for pneumonia  - Continue PO Vanc for C. Diff      #Pneumonia  :: Has had multiple hospital admissions for pneumonia.  :: CXR 08/24 demonstrated right basilar consolidation; CT AP w/o contrast shows bilateral plural effusion and right basilar consolidation.  :: Started on cefepime and azithromycin in the ED   - Dc'd cefepime 8/25 d/t increased risk of neurotoxicity iso ESRD   - Completed 3 day course of azithromycin on 8/26   - Received IV Vanc (8/26-8/28)   - Received IV Zosyn (8/25-8/28)  - Transitioned to Augmentin 8/28, did not tolerate PO dose on 8/28, last dose scheduled for 8/29  :: Urine Strept & Legionella antigens negative  :: Pro-calcitonin elevated at 0.25 (8/25), 0.86 (8/28)  :: MRSA probe negative  :: Blood cultures    -8/24 - NGTD   -8/25 - NGTD  :: Thoracentesis (8/26) w/ IR  - Lights Criteria by LDH and protein  - Culture data NGTD  - Finish Augmentin today    #Chest pain   ::Non-radiating left sided dull chest pain  :: troponin 8/27 wnl  :: last stress test 2/2021 unremarkable  - repeat EKG stable  - continuous telemetry refused by patient despite patient education  - outpatient stress test     #Diarrhea  :: Chronic, waxes and wanes; dates back to AUS placement in Feb. 2023 that got infected.  :: H/o right hemicolectomy (2017).  :: Has been controlled with Loperamide.  :: Colonoscopy on 8/2023 - No polyps found. Repeat surveillance in 7 yrs  (2030).   :: C. Diff PCR (+), EIA (-), currently receiving PO Vanc for decolonization    - Hold Loperamide.  -stool pathogen panel negative       #Nausea & Vomiting  :: Has gastroparesis; confirmed with [facility] 04/24.  :: Has been on Reglan 5mg bid (stopped recently), ProtoNix 40mg dly.  - Hold Reglan.  - Continue ProtoNix 40mg dly.  - Continue Erythromycin IV 200mg tid  - Continue dexamethasone 1.6 mg IV     #ESRD s/p renal transplant (X2)  #Hyponatremia  :: Renal transplants in 1992 and 2013;  :: Now with impaired allograft function currently on immunosuppression (on Tacrolimus and Prednisone).  :: Baseline Cr 2.5 - 3, makes urine  :: On TThS iHD schedule; last session (08/24/24)  :: Nephrology following, appreciate recs   - Continue iHD schedule of TThS  :: Transplant nephrology has been following and are aware of the current admission.  :: New left pelvic abdominal pain 8/28, notified transplant nephrology  - Recommended - renal transplant U/S, prednisone increased from 5 mg to 10 mg daily, and restarting tacrolimus at 1 mg daily   - Hold Bumex   - Restart tacrolimus 0.5 mg BID  - Hold Prednisone 10 mg  - Continue dexamethasone 1.6 mg IV with nausea/vomiting as above  - Strict I&Os  - Follow daily RFP     #Asymptomatic bacteriuria    :: UA (8/25) with 1+ bacteria, proteinuria, otherwise unremarkable  - No need for further antibiotics, will follow if symptomatic    #Chronic anemia, stable  :: Likely 2/2 to ESRD as above  :: Baseline Hgb ~7-9  - Epogen/procrit on dialysis days per nephrology   - Continue to monitor with daily CBC    #T2DM  :: Last A1C 6.6 (7/10/24).  :: Home regimen: insulin glargine 20 units daily and SSI.  :: Patient not eating as usual 2/2 to gastroparesis as above.  - Decreased basal Lantus to 5 units 8/28 with intolerance of PO intake, will increase dose based on the AM BG as needed.   - Continue on SSI #2.     #Chronic HTN  :: H/o multiple prior admissions for hypervolemia and hypertensive  urgency.   :: Has been on (Hydralazine 100 mg TID, carvedilol 37.5 mg, BID, Nifedipine 60 mg daily, imdur 60 mg nightly, Bumex 2 mg QID on non iHD days).  :: /79 on presentation, continues to be labile   - Continue home Coreg and Imdur.  - Start nifedipine 30 mg daily  - IV Hydralazine 20 mg prn  - Hold Bumex.     #Chronic lower back pain  - Encourage patient to be out of bed to chair 3 times daily with meals.  - Tylenol 975 mg q8h   - Outpatient follow up      ---------------------------------------------------------------------------  Fluids: PRN  Electrolytes: PRN  Nutrition: Adult diet Renal; Potassium Restricted 2 gm (50mEq); 2 - 3 grams Sodium  Access: PIV     DVT prophylaxis: SQH, SCD  GI Ppx: Pantoprazole      Abx: Augmentin, PO Vancomycin, IV Erythromycin   O2: RA     Pain regimen: Scheduled Tylenol  GI Laxative: None     Code Status: Full Code (Confirmed on admission)   Emergency Contact / NOK: Extended Emergency Contact Information  Primary Emergency Contact: KETTY PLASENCIA  Address: [address] [place] [address] [facility]  Home Phone: [phone]  Work Phone: [phone]  Mobile Phone: [phone]  Relation: Friend  Preferred language: English  Secondary Emergency Contact: CLEMENTE ROB  Address: [address] [place] [address] [facility]  Home Phone: [phone]  Mobile Phone: [phone]  Relation: Daughter           Kei Luther DO  PGY-1 Internal Medicine

## 2024-08-29 NOTE — TREATMENT PLAN
8/29/24 1546 Transitional Care Coordinator Notes:    Patient has a LACE score of 78 or above. Patient has a follow up appointment scheduled for 10/2/24 with Elma TAI.

## 2024-08-29 NOTE — PROGRESS NOTES
8/29/24 6677 Transitional Care Coordinator Notes:    Patient currently being treated for pneumonia. On contact precautions for c diff. Patient will be scheduled for a renal ultrasound today. Patient has a history of renal transplant x 2, transplant nephrology is following.    OT evaluated patient and stated no discharge needs. Team is anticipating discharge early next week.           Assessment/Plan   Assessment & Plan  Pneumonia symptoms    ESRD (end stage renal disease) on dialysis (Multi)                 Kristen Omalley RN

## 2024-08-29 NOTE — PROGRESS NOTES
Occupational Therapy    Evaluation/Screen    Patient Name: Manolo Bashir  MRN: [id number]  Today's Date: 8/29/2024       Assessment  IP OT Assessment  End of Session Communication: Bedside nurse  End of Session Patient Position: Bed, 2 rail up, Alarm off, not on at start of session, Alarm off, caregiver present (sitting unsupported on EOB)  Plan:  No Skilled OT: No acute OT goals identified  OT Discharge Recommendations: No OT needed after discharge, No further acute OT  OT - OK to Discharge: Yes    Subjective   Current Problem:  1. Pneumonia symptoms        2. Fever and chills        3. Immunocompromised (Multi)        4. Pneumonia of right lower lobe due to infectious organism        5. Kidney transplanted ([facility])   kidney transplant     kidney transplant    CANCELED: Renal artery duplex transplant    CANCELED: Renal artery duplex transplant    CANCELED: Renal artery duplex transplant    CANCELED: Renal artery duplex transplant        General:    Reason for Referral: OT for ADL assessment  Referred By: SUDHEER Drummond  Past Medical History Relevant to Rehab: ESRD (s/p renal transplant x2), prostate cancer (s/p radical prostatectomy), T2DM, MGUS, HTN  Missed Visit: No  Missed Visit Reason: Patient in a medical procedure (Pt off unit and unavailable for OT evaluation. Will reattempt as schedule permits)  Family/Caregiver Present: Yes  Caregiver Feedback: Significant other present  Prior to Session Communication: Bedside nurse  Patient Position Received: Bed, 2 rail up, Alarm off, not on at start of session, Alarm off, caregiver present (pt sitting independently on EOB eating breakfast; feet supported; no UB support needed)  Preferred Learning Style: verbal    General Comment: 69 YOM admitted for pneumonia and on contact precautions to r/o Cdiff. Pt and significant other educated on reason for OT consultation and acute services. Both decline change in functional status and able to complete all ADLs at independent  level with increased time due to nausea and fatigue. Pt reports participating in activities as able and denies need for skilled intervention.  OT eval not complete. Orders discontinued.

## 2024-08-29 NOTE — CARE PLAN
The patient's goals for the shift include Patient will tolerate diet with no c/o of N/V Diarrhea this shift    The clinical goals for the shift include Pt safety will be maintained

## 2024-08-29 NOTE — SIGNIFICANT EVENT
Transplant Nephrology entry     Primary team reached out re: LLQ pain  Transplant allograft ultrasound pending  Resume Tacrolimus 0.5mg po bid  Increase Prednisone 10mg po once daily x 2 weeks, then decrease to 5mg po once daily.  Transplant nephrology clinic appointment in 1-2 months.     KEVIN Kidd MD   Nephrology fellow   Transplant Nephrology pager [phone]  Available via Epic Chat

## 2024-08-29 NOTE — PROGRESS NOTES
Occupational Therapy                 Therapy Communication Note    Patient Name: Manolo Bashir  MRN: [id number]  Today's Date: 8/29/2024     Discipline: Occupational Therapy    Missed Visit Reason: Missed Visit Reason: Patient in a medical procedure (Pt off unit and unavailable for OT evaluation. Will reattempt as schedule permits)    Missed Time: Attempt    Comment: [phone]

## 2024-08-30 PROBLEM — J18.9 PNEUMONIA SYMPTOMS: Status: RESOLVED | Noted: 2024-08-24 | Resolved: 2024-08-30

## 2024-08-30 LAB
ALBUMIN SERPL BCP-MCNC: 2.9 G/DL (ref 3.4–5)
ANION GAP SERPL CALC-SCNC: 11 MMOL/L (ref 10–20)
BASOPHILS # BLD AUTO: 0.02 X10*3/UL (ref 0–0.1)
BASOPHILS NFR BLD AUTO: 0.5 %
BUN SERPL-MCNC: 13 MG/DL (ref 6–23)
CALCIUM SERPL-MCNC: 9 MG/DL (ref 8.6–10.6)
CARDIAC TROPONIN I PNL SERPL HS: 21 NG/L (ref 0–53)
CHLORIDE SERPL-SCNC: 99 MMOL/L (ref 98–107)
CO2 SERPL-SCNC: 30 MMOL/L (ref 21–32)
CREAT SERPL-MCNC: 5.21 MG/DL (ref 0.5–1.3)
EGFRCR SERPLBLD CKD-EPI 2021: 11 ML/MIN/1.73M*2
EOSINOPHIL # BLD AUTO: 0.04 X10*3/UL (ref 0–0.7)
EOSINOPHIL NFR BLD AUTO: 0.9 %
ERYTHROCYTE [DISTWIDTH] IN BLOOD BY AUTOMATED COUNT: 12.5 % (ref 11.5–14.5)
GLUCOSE BLD MANUAL STRIP-MCNC: 232 MG/DL (ref 74–99)
GLUCOSE BLD MANUAL STRIP-MCNC: 245 MG/DL (ref 74–99)
GLUCOSE SERPL-MCNC: 242 MG/DL (ref 74–99)
HCT VFR BLD AUTO: 24.8 % (ref 41–52)
HGB BLD-MCNC: 8 G/DL (ref 13.5–17.5)
IMM GRANULOCYTES # BLD AUTO: 0.03 X10*3/UL (ref 0–0.7)
IMM GRANULOCYTES NFR BLD AUTO: 0.7 % (ref 0–0.9)
LYMPHOCYTES # BLD AUTO: 0.51 X10*3/UL (ref 1.2–4.8)
LYMPHOCYTES NFR BLD AUTO: 11.9 %
MAGNESIUM SERPL-MCNC: 1.89 MG/DL (ref 1.6–2.4)
MCH RBC QN AUTO: 27.2 PG (ref 26–34)
MCHC RBC AUTO-ENTMCNC: 32.3 G/DL (ref 32–36)
MCV RBC AUTO: 84 FL (ref 80–100)
MONOCYTES # BLD AUTO: 0.31 X10*3/UL (ref 0.1–1)
MONOCYTES NFR BLD AUTO: 7.3 %
NEUTROPHILS # BLD AUTO: 3.36 X10*3/UL (ref 1.2–7.7)
NEUTROPHILS NFR BLD AUTO: 78.7 %
NRBC BLD-RTO: 0 /100 WBCS (ref 0–0)
PHOSPHATE SERPL-MCNC: 2.2 MG/DL (ref 2.5–4.9)
PLATELET # BLD AUTO: 143 X10*3/UL (ref 150–450)
POTASSIUM SERPL-SCNC: 4.1 MMOL/L (ref 3.5–5.3)
RBC # BLD AUTO: 2.94 X10*6/UL (ref 4.5–5.9)
SODIUM SERPL-SCNC: 136 MMOL/L (ref 136–145)
TACROLIMUS BLD-MCNC: <2 NG/ML
WBC # BLD AUTO: 4.3 X10*3/UL (ref 4.4–11.3)

## 2024-08-30 PROCEDURE — 2500000004 HC RX 250 GENERAL PHARMACY W/ HCPCS (ALT 636 FOR OP/ED)

## 2024-08-30 PROCEDURE — 2500000002 HC RX 250 W HCPCS SELF ADMINISTERED DRUGS (ALT 637 FOR MEDICARE OP, ALT 636 FOR OP/ED)

## 2024-08-30 PROCEDURE — 2500000001 HC RX 250 WO HCPCS SELF ADMINISTERED DRUGS (ALT 637 FOR MEDICARE OP)

## 2024-08-30 PROCEDURE — 83735 ASSAY OF MAGNESIUM: CPT

## 2024-08-30 PROCEDURE — 99233 SBSQ HOSP IP/OBS HIGH 50: CPT | Performed by: NURSE PRACTITIONER

## 2024-08-30 PROCEDURE — 85025 COMPLETE CBC W/AUTO DIFF WBC: CPT

## 2024-08-30 PROCEDURE — 80197 ASSAY OF TACROLIMUS: CPT

## 2024-08-30 PROCEDURE — 84100 ASSAY OF PHOSPHORUS: CPT

## 2024-08-30 PROCEDURE — 2500000004 HC RX 250 GENERAL PHARMACY W/ HCPCS (ALT 636 FOR OP/ED): Performed by: STUDENT IN AN ORGANIZED HEALTH CARE EDUCATION/TRAINING PROGRAM

## 2024-08-30 PROCEDURE — 82947 ASSAY GLUCOSE BLOOD QUANT: CPT

## 2024-08-30 PROCEDURE — 84484 ASSAY OF TROPONIN QUANT: CPT

## 2024-08-30 PROCEDURE — 1100000001 HC PRIVATE ROOM DAILY

## 2024-08-30 PROCEDURE — 99232 SBSQ HOSP IP/OBS MODERATE 35: CPT

## 2024-08-30 PROCEDURE — 36415 COLL VENOUS BLD VENIPUNCTURE: CPT

## 2024-08-30 RX ORDER — PREDNISONE 5 MG/1
10 TABLET ORAL EVERY MORNING
Status: DISCONTINUED | OUTPATIENT
Start: 2024-08-30 | End: 2024-08-31 | Stop reason: HOSPADM

## 2024-08-30 RX ORDER — NIFEDIPINE 30 MG/1
30 TABLET, FILM COATED, EXTENDED RELEASE ORAL ONCE
Status: COMPLETED | OUTPATIENT
Start: 2024-08-30 | End: 2024-08-30

## 2024-08-30 RX ORDER — HYDRALAZINE HYDROCHLORIDE 50 MG/1
50 TABLET, FILM COATED ORAL 3 TIMES DAILY
Status: DISCONTINUED | OUTPATIENT
Start: 2024-08-30 | End: 2024-08-31 | Stop reason: HOSPADM

## 2024-08-30 RX ORDER — NIFEDIPINE 60 MG/1
60 TABLET, FILM COATED, EXTENDED RELEASE ORAL
Status: DISCONTINUED | OUTPATIENT
Start: 2024-08-31 | End: 2024-08-31 | Stop reason: HOSPADM

## 2024-08-30 RX ORDER — INSULIN GLARGINE 100 [IU]/ML
10 INJECTION, SOLUTION SUBCUTANEOUS NIGHTLY
Status: DISCONTINUED | OUTPATIENT
Start: 2024-08-30 | End: 2024-08-31 | Stop reason: HOSPADM

## 2024-08-30 RX ADMIN — VANCOMYCIN HYDROCHLORIDE 125 MG: 125 CAPSULE ORAL at 18:33

## 2024-08-30 RX ADMIN — NIFEDIPINE 30 MG: 30 TABLET, FILM COATED, EXTENDED RELEASE ORAL at 13:38

## 2024-08-30 RX ADMIN — METOCLOPRAMIDE 5 MG: 5 INJECTION, SOLUTION INTRAMUSCULAR; INTRAVENOUS at 11:43

## 2024-08-30 RX ADMIN — HEPARIN SODIUM 5000 UNITS: 5000 INJECTION INTRAVENOUS; SUBCUTANEOUS at 05:41

## 2024-08-30 RX ADMIN — VANCOMYCIN HYDROCHLORIDE 125 MG: 125 CAPSULE ORAL at 06:44

## 2024-08-30 RX ADMIN — AMPICILLIN SODIUM AND SULBACTAM SODIUM 3 G: 2; 1 INJECTION, POWDER, FOR SOLUTION INTRAMUSCULAR; INTRAVENOUS at 11:49

## 2024-08-30 RX ADMIN — INSULIN LISPRO 4 UNITS: 100 INJECTION, SOLUTION INTRAVENOUS; SUBCUTANEOUS at 13:37

## 2024-08-30 RX ADMIN — ACETAMINOPHEN 975 MG: 325 TABLET ORAL at 13:38

## 2024-08-30 RX ADMIN — HEPARIN SODIUM 5000 UNITS: 5000 INJECTION INTRAVENOUS; SUBCUTANEOUS at 13:39

## 2024-08-30 RX ADMIN — METOCLOPRAMIDE 5 MG: 5 INJECTION, SOLUTION INTRAMUSCULAR; INTRAVENOUS at 21:23

## 2024-08-30 RX ADMIN — ISOSORBIDE MONONITRATE 60 MG: 60 TABLET, EXTENDED RELEASE ORAL at 11:43

## 2024-08-30 RX ADMIN — ASCORBIC ACID, THIAMINE MONONITRATE,RIBOFLAVIN, NIACINAMIDE, PYRIDOXINE HYDROCHLORIDE, FOLIC ACID, CYANOCOBALAMIN, BIOTIN, CALCIUM PANTOTHENATE, 1 CAPSULE: 100; 1.5; 1.7; 20; 10; 1; 6000; 150000; 5 CAPSULE, LIQUID FILLED ORAL at 11:43

## 2024-08-30 RX ADMIN — HEPARIN SODIUM 5000 UNITS: 5000 INJECTION INTRAVENOUS; SUBCUTANEOUS at 21:23

## 2024-08-30 RX ADMIN — INSULIN GLARGINE 10 UNITS: 100 INJECTION, SOLUTION SUBCUTANEOUS at 21:23

## 2024-08-30 RX ADMIN — NIFEDIPINE 30 MG: 30 TABLET, FILM COATED, EXTENDED RELEASE ORAL at 06:44

## 2024-08-30 RX ADMIN — HYDRALAZINE HYDROCHLORIDE 50 MG: 50 TABLET ORAL at 21:22

## 2024-08-30 RX ADMIN — TACROLIMUS 0.5 MG: 0.5 CAPSULE ORAL at 05:41

## 2024-08-30 RX ADMIN — CALCITRIOL 0.5 MCG: 0.5 CAPSULE ORAL at 11:50

## 2024-08-30 RX ADMIN — ASPIRIN 81 MG 81 MG: 81 TABLET ORAL at 11:44

## 2024-08-30 RX ADMIN — HYDRALAZINE HYDROCHLORIDE 50 MG: 50 TABLET ORAL at 18:33

## 2024-08-30 RX ADMIN — VANCOMYCIN HYDROCHLORIDE 125 MG: 125 CAPSULE ORAL at 21:23

## 2024-08-30 RX ADMIN — PANTOPRAZOLE SODIUM 40 MG: 40 TABLET, DELAYED RELEASE ORAL at 06:44

## 2024-08-30 RX ADMIN — HYDRALAZINE HYDROCHLORIDE 50 MG: 50 TABLET ORAL at 11:44

## 2024-08-30 RX ADMIN — PRAVASTATIN SODIUM 10 MG: 20 TABLET ORAL at 21:22

## 2024-08-30 RX ADMIN — TACROLIMUS 0.5 MG: 0.5 CAPSULE ORAL at 18:33

## 2024-08-30 RX ADMIN — ACETAMINOPHEN 975 MG: 325 TABLET ORAL at 05:41

## 2024-08-30 RX ADMIN — PREDNISONE 10 MG: 5 TABLET ORAL at 13:38

## 2024-08-30 RX ADMIN — SENNOSIDES 8.6 MG: 8.6 TABLET, FILM COATED ORAL at 21:23

## 2024-08-30 RX ADMIN — VANCOMYCIN HYDROCHLORIDE 125 MG: 125 CAPSULE ORAL at 13:38

## 2024-08-30 RX ADMIN — CINACALCET 30 MG: 30 TABLET, FILM COATED ORAL at 11:44

## 2024-08-30 RX ADMIN — INSULIN LISPRO 4 UNITS: 100 INJECTION, SOLUTION INTRAVENOUS; SUBCUTANEOUS at 18:33

## 2024-08-30 RX ADMIN — CARVEDILOL 37.5 MG: 25 TABLET, FILM COATED ORAL at 21:22

## 2024-08-30 RX ADMIN — CARVEDILOL 37.5 MG: 25 TABLET, FILM COATED ORAL at 11:48

## 2024-08-30 ASSESSMENT — COGNITIVE AND FUNCTIONAL STATUS - GENERAL
HELP NEEDED FOR BATHING: A LITTLE
DRESSING REGULAR LOWER BODY CLOTHING: A LITTLE
EATING MEALS: A LITTLE
HELP NEEDED FOR BATHING: A LITTLE
CLIMB 3 TO 5 STEPS WITH RAILING: A LITTLE
WALKING IN HOSPITAL ROOM: A LITTLE
CLIMB 3 TO 5 STEPS WITH RAILING: A LITTLE
TOILETING: A LITTLE
STANDING UP FROM CHAIR USING ARMS: A LITTLE
DAILY ACTIVITIY SCORE: 22
MOVING FROM LYING ON BACK TO SITTING ON SIDE OF FLAT BED WITH BEDRAILS: A LITTLE
STANDING UP FROM CHAIR USING ARMS: A LITTLE
MOVING TO AND FROM BED TO CHAIR: A LITTLE
MOVING TO AND FROM BED TO CHAIR: A LITTLE
TURNING FROM BACK TO SIDE WHILE IN FLAT BAD: A LITTLE
WALKING IN HOSPITAL ROOM: A LITTLE
MOBILITY SCORE: 20
DRESSING REGULAR UPPER BODY CLOTHING: A LITTLE
PERSONAL GROOMING: A LITTLE
PERSONAL GROOMING: A LITTLE

## 2024-08-30 ASSESSMENT — PAIN SCALES - GENERAL: PAINLEVEL_OUTOF10: 5 - MODERATE PAIN

## 2024-08-30 ASSESSMENT — PAIN SCALES - WONG BAKER: WONGBAKER_NUMERICALRESPONSE: NO HURT

## 2024-08-30 NOTE — CARE PLAN
The patient's goals for the shift include Patient will tolerate diet with no c/o of N/V Diarrhea this shift    The clinical goals for the shift include Pt will remain injury free this shift.    Over the shift, the patient did not make progress toward the following goals. Barriers to progression include . Recommendations to address these barriers include .

## 2024-08-30 NOTE — PROGRESS NOTES
8/30/24 2592 Transitional Care Coordinator Notes:    Per team, patient will be switched to oral medications today. He is close to being medically ready to discharge. Anticipating discharge in 1-2 days.                    Assessment/Plan   Assessment & Plan  Pneumonia symptoms    ESRD (end stage renal disease) on dialysis (Multi)               Kristen Omalley RN

## 2024-08-30 NOTE — PROGRESS NOTES
"Manolo Bashir is a 69 y.o. male on day 6 of admission presenting with Pneumonia symptoms.    Subjective   Patient denies headache, chest pain, abdominal pain, back pain this morning. Denies nausea or vomiting over night and currently.       Objective     Physical Exam  Constitutional:       General: He is not in acute distress.     Comments: Somnolent, but able to awaken when spoken to.    HENT:      Head: Normocephalic and atraumatic.      Mouth/Throat:      Mouth: Mucous membranes are moist.      Pharynx: Oropharynx is clear. No oropharyngeal exudate or posterior oropharyngeal erythema.   Eyes:      Extraocular Movements: Extraocular movements intact.   Cardiovascular:      Rate and Rhythm: Normal rate and regular rhythm.      Pulses: Normal pulses.      Heart sounds: Normal heart sounds. No murmur heard.     No gallop.   Pulmonary:      Effort: Pulmonary effort is normal. No respiratory distress.      Breath sounds: Rhonchi and rales (bilateral in the bases) present. No wheezing.   Abdominal:      General: Abdomen is flat. There is no distension.      Palpations: Abdomen is soft.      Tenderness: There is no abdominal tenderness.   Musculoskeletal:         General: Tenderness (to palpation of the lower back) present. No swelling.      Right lower leg: No edema.      Left lower leg: No edema.   Skin:     General: Skin is warm and dry.   Neurological:      General: No focal deficit present.      Mental Status: He is alert and oriented to person, place, and time.         Last Recorded Vitals  Blood pressure (!) 196/82, pulse 68, temperature 36.6 °C (97.9 °F), resp. rate 18, height 1.727 m (5' 8\"), weight 72.6 kg (160 lb), SpO2 91%.  Intake/Output last 3 Shifts:  I/O last 3 completed shifts:  In: 1200 (16.5 mL/kg) [I.V.:800 (11 mL/kg); Other:400]  Out: 2900 (40 mL/kg) [Other:2900]  Weight: 72.6 kg     Relevant Results  Results for orders placed or performed during the hospital encounter of 08/24/24 (from the past 24 " hour(s))   POCT GLUCOSE   Result Value Ref Range    POCT Glucose 86 74 - 99 mg/dL   POCT GLUCOSE   Result Value Ref Range    POCT Glucose 211 (H) 74 - 99 mg/dL   Troponin I, High Sensitivity   Result Value Ref Range    Troponin I, High Sensitivity (CMC) 21 0 - 53 ng/L   CBC and Auto Differential   Result Value Ref Range    WBC 4.3 (L) 4.4 - 11.3 x10*3/uL    nRBC 0.0 0.0 - 0.0 /100 WBCs    RBC 2.94 (L) 4.50 - 5.90 x10*6/uL    Hemoglobin 8.0 (L) 13.5 - 17.5 g/dL    Hematocrit 24.8 (L) 41.0 - 52.0 %    MCV 84 80 - 100 fL    MCH 27.2 26.0 - 34.0 pg    MCHC 32.3 32.0 - 36.0 g/dL    RDW 12.5 11.5 - 14.5 %    Platelets 143 (L) 150 - 450 x10*3/uL    Neutrophils % 78.7 40.0 - 80.0 %    Immature Granulocytes %, Automated 0.7 0.0 - 0.9 %    Lymphocytes % 11.9 13.0 - 44.0 %    Monocytes % 7.3 2.0 - 10.0 %    Eosinophils % 0.9 0.0 - 6.0 %    Basophils % 0.5 0.0 - 2.0 %    Neutrophils Absolute 3.36 1.20 - 7.70 x10*3/uL    Immature Granulocytes Absolute, Automated 0.03 0.00 - 0.70 x10*3/uL    Lymphocytes Absolute 0.51 (L) 1.20 - 4.80 x10*3/uL    Monocytes Absolute 0.31 0.10 - 1.00 x10*3/uL    Eosinophils Absolute 0.04 0.00 - 0.70 x10*3/uL    Basophils Absolute 0.02 0.00 - 0.10 x10*3/uL   Magnesium   Result Value Ref Range    Magnesium 1.89 1.60 - 2.40 mg/dL   Renal function panel   Result Value Ref Range    Glucose 242 (H) 74 - 99 mg/dL    Sodium 136 136 - 145 mmol/L    Potassium 4.1 3.5 - 5.3 mmol/L    Chloride 99 98 - 107 mmol/L    Bicarbonate 30 21 - 32 mmol/L    Anion Gap 11 10 - 20 mmol/L    Urea Nitrogen 13 6 - 23 mg/dL    Creatinine 5.21 (H) 0.50 - 1.30 mg/dL    eGFR 11 (L) >60 mL/min/1.73m*2    Calcium 9.0 8.6 - 10.6 mg/dL    Phosphorus 2.2 (L) 2.5 - 4.9 mg/dL    Albumin 2.9 (L) 3.4 - 5.0 g/dL   Tacrolimus level   Result Value Ref Range    Tacrolimus  <2.0 <=15.0 ng/mL     *Note: Due to a large number of results and/or encounters for the requested time period, some results have not been displayed. A complete set of results  can be found in Results Review.       Electrocardiogram, 12-lead PRN ACS symptoms  Result Date: 8/26/2024  Sinus rhythm with frequent Premature ventricular complexes Nonspecific intraventricular block Cannot rule out Anterior infarct , age undetermined Abnormal ECG When compared with ECG of 24-AUG-2024 12:11, Premature ventricular complexes are now Present QRS axis Shifted left Nonspecific T wave abnormality no longer evident in Lateral leads    ECG 12 lead  Result Date: 8/24/2024  Normal sinus rhythm Nonspecific intraventricular block Minimal voltage criteria for LVH, may be normal variant ( [place] product ) Abnormal ECG When compared with ECG of 26-JUN-2024 08:26, No significant change was found See ED provider note for full interpretation and clinical correlation Confirmed by Faustina Workman ([id number]) on 8/24/2024 9:19:13 PM    CT abdomen pelvis wo IV contrast  Result Date: 8/24/2024  FINDINGS: Please note that the evaluation of vessels, lymph nodes and organs is limited without intravenous contrast.   LOWER CHEST: Moderate right pleural effusion. Small left pleural effusion. Mild adjacent lung atelectasis is noted. A few ground-glass opacities noted in the bilateral lower lobes which can be seen in the setting of pulmonary edema.. The heart is enlarged.  No evidence of pericardial effusion.   ABDOMEN:   LIVER: The liver is normal in size without evidence of focal lesions.   BILE DUCTS: The intrahepatic and extrahepatic ducts are not dilated.   GALLBLADDER: The gallbladder is not definitively visualized and may be decompressed versus surgically absent.   PANCREAS: The pancreas appears unremarkable without evidence of ductal dilatation or masses.   SPLEEN: The spleen is normal in size with no evidence of focal lesions.   ADRENAL GLANDS: Bilateral adrenal glands appear normal.   KIDNEYS AND URETERS: The bilateral kidneys are atrophic. Left iliac fossa renal transplant without associated perinephric fluid collection  or hydroureteronephrosis..   PELVIS:   BLADDER: The urinary bladder is decompressed, limited for evaluation. There is mild bladder wall thickening, similar to prior, and suspected to be secondary to bladder decompression.   REPRODUCTIVE ORGANS: The prostate is not enlarged.   BOWEL: The stomach is unremarkable. The small bowel is normal in caliber without evidence of wall thickening throughout. Status post partial colectomy. The large bowel is otherwise normal in caliber and demonstrates no abnormal wall thickening. The appendix is not definitely visualized. There is however no pericecal stranding or fluid.   VESSELS: Diffuse vascular calcifications throughout the abdomen. The aorta and IVC otherwise unremarkable.   PERITONEUM/RETROPERITONEUM/LYMPH NODES: There is no free or loculated fluid collection, no free intraperitoneal air. The retroperitoneum appears normal.  No abdominopelvic lymphadenopathy is present.   ABDOMINAL WALL: The abdominal wall soft tissues appear normal.   BONES: No suspicious osseous lesions are identified. Degenerative discogenic disease is noted in the lower thoracic and lumbar spine. Mild compression deformity of the T10 vertebral body is likely degenerative.     1.  Bilateral pleural effusions right-greater-than-left with the ground-glass opacities in the bilateral lower lobes. Findings can be seen with pulmonary edema.. 2. No acute pathology within the abdomen or pelvis to explain right-sided abdominal pain.      XR chest 1 view  Result Date: 8/24/2024  FINDINGS: Sizable area of right basilar consolidation consistent with pneumonia.   There is increased generalized prominence of the perihilar and basilar interstitium and a superimposed component of edema suggested     Sizable area of right basilar consolidation consistent with pneumonia.   There is increased generalized prominence of the perihilar and basilar interstitium and a superimposed component of edema suggested                          Assessment/Plan   Assessment & Plan  Pneumonia symptoms    ESRD (end stage renal disease) on dialysis (Multi)    Manolo Bashir is a 69 y.o. male with PMHx of ESRD (s/p renal transplant x2), prostate cancer (s/p radical prostatectomy), T2DM, MGUS, HTN who presented with features suggestive of pneumonia and gastroparesis.      Updates 08/27/24:  - iHD today  - Transitioned to metoclopramide, transitioned back to prednisone 10 mg PO  - Labile blood pressure - restarting nifedipine 60 mg daily, continuing carvedilol and imdur, restarted hydralazine @ 50 mg TID  - 2 doses of Unasyn remaining for pneumonia  - Continue PO Vanc for C. Diff      #Pneumonia  :: Has had multiple hospital admissions for pneumonia.  :: CXR 08/24 demonstrated right basilar consolidation; CT AP w/o contrast shows bilateral plural effusion and right basilar consolidation.  :: Started on cefepime and azithromycin in the ED   - Dc'd cefepime 8/25 d/t increased risk of neurotoxicity iso ESRD   - Completed 3 day course of azithromycin on 8/26   - Received IV Vanc (8/26-8/28)   - Received IV Zosyn (8/25-8/28)  - Unasyn for 2 more days (8/30-8/31)  :: Urine Strept & Legionella antigens negative  :: Pro-calcitonin elevated at 0.25 (8/25), 0.86 (8/28)  :: MRSA probe negative  :: Blood cultures    -8/24 - NGTD   -8/25 - NGTD  :: Thoracentesis (8/26) w/ IR  - Lights Criteria by LDH and protein  - Culture data NGTD  - Finish Unasyn tomorrow 8/31    #Chest pain   ::Non-radiating left sided dull chest pain  :: troponin 8/27 wnl  :: last stress test 2/2021 unremarkable  - repeat EKG stable  - continuous telemetry refused by patient despite patient education  - outpatient stress test     #Diarrhea, resolved  :: Chronic, waxes and wanes; dates back to AUS placement in Feb. 2023 that got infected.  :: H/o right hemicolectomy (2017).  :: Has been controlled with Loperamide.  :: Colonoscopy on 8/2023 - No polyps found. Repeat surveillance in 7 yrs (2030).   ::  C. Diff PCR (+), EIA (-), currently receiving PO Vanc for decolonization    - Hold Loperamide.  -stool pathogen panel negative    - PO Vanc (started 8/25), end date of 9/7/24     #Nausea & Vomiting  :: Has gastroparesis; confirmed with [facility] 04/24.  :: Has been on Reglan 5mg bid (stopped recently), ProtoNix 40mg dly.  - Hold Reglan.  - Continue ProtoNix 40mg dly.  - Restarted metoclopramide 8/29  - Discontinue dexamethasone 1.6 mg IV     #ESRD s/p renal transplant (X2)  #Hyponatremia  :: Renal transplants in 1992 and 2013;  :: Now with impaired allograft function currently on immunosuppression (on Tacrolimus and Prednisone).  :: Baseline Cr 2.5 - 3, makes urine  :: On TThS iHD schedule; last session (08/24/24)  :: Nephrology following, appreciate recs   - Continue iHD schedule of TThS  :: Transplant nephrology has been following and are aware of the current admission.  :: New left pelvic abdominal pain 8/28, notified transplant nephrology  - Recommended - renal transplant U/S, prednisone increased from 5 mg to 10 mg daily, and restarting tacrolimus at 1 mg daily   :: Tacrolimus level undetectable 8/30  - Hold Bumex   - Continue tacrolimus 0.5 mg BID  - Continue Prednisone 10 mg once daily x2 weeks then decrease to 5 mg daily  - Strict I&Os  - Follow daily RFP  - Outpatient follow up with transplant nephrology in 1-2 months    #Asymptomatic bacteriuria    :: UA (8/25) with 1+ bacteria, proteinuria, otherwise unremarkable  - No need for further antibiotics, will follow if symptomatic    #Chronic anemia, stable  :: Likely 2/2 to ESRD as above  :: Baseline Hgb ~7-9  - Epogen/procrit on dialysis days per nephrology   - Continue to monitor with daily CBC    #T2DM  :: Last A1C 6.6 (7/10/24).  :: Home regimen: insulin glargine 20 units daily and SSI.  :: Patient not eating as usual 2/2 to gastroparesis as above.  - Decreased basal Lantus to 5 units 8/28 with intolerance of PO intake, will increase dose based on the AM BG as  needed.   - Continue on SSI #2.     #Chronic HTN  :: H/o multiple prior admissions for hypervolemia and hypertensive urgency.   :: Has been on (Hydralazine 100 mg TID, carvedilol 37.5 mg, BID, Nifedipine 60 mg daily, imdur 60 mg nightly, Bumex 2 mg QID on non iHD days).  :: /79 on presentation, continues to be labile   - Continue home Coreg and Imdur.  - Restart nifedipine 60 mg daily  - PO Hydralazine 50 mg TID  - Hold Bumex.     #Chronic lower back pain  - Encourage patient to be out of bed to chair 3 times daily with meals.  - Tylenol 975 mg q8h   - Outpatient follow up      ---------------------------------------------------------------------------  Fluids: PRN  Electrolytes: PRN  Nutrition: Adult diet Renal; Potassium Restricted 2 gm (50mEq); 2 - 3 grams Sodium  Access: PIV     DVT prophylaxis: SQH, SCD  GI Ppx: Pantoprazole      Abx: Unasyn, PO Vancomycin    O2: RA     Pain regimen: Scheduled Tylenol  GI Laxative: None     Code Status: Full Code (Confirmed on admission)   Emergency Contact / NOK: Extended Emergency Contact Information  Primary Emergency Contact: KETTY PLASENCIA  Address: [address] [place] [address] [facility]  Home Phone: [phone]  Work Phone: [phone]  Mobile Phone: [phone]  Relation: Friend  Preferred language: English  Secondary Emergency Contact: CLEMENTE ROB  Address: [address] [place] [address] [facility]  Home Phone: [phone]  Mobile Phone: [phone]  Relation: Daughter           Kei Luther DO  PGY-1 Internal Medicine

## 2024-08-30 NOTE — PROGRESS NOTES
"Manolo Bashir is a 69 y.o. male on day 6 of admission presenting with Pneumonia symptoms.    Subjective   Pt walking  around in room. Denies sob, n/v/d, fever, cough, chills, pain, chest pain, light head, dizziness, constipation       Objective     Physical Exam  Vitals and nursing note reviewed.   Cardiovascular:      Comments:   8/26-S/p thoracentesis with 850cc return  Pulmonary:      Comments: Jeffrey lung sounds dim  Abdominal:      General: There is distension.      Comments: Sore to palpation   Genitourinary:     Comments: States make little urine  Musculoskeletal:      Comments: Ble without edema   Skin:     General: Skin is warm and dry.   Neurological:      Mental Status: He is alert and oriented to person, place, and time.   Psychiatric:         Mood and Affect: Mood normal.         Behavior: Behavior normal.         Last Recorded Vitals  Blood pressure 116/60, pulse 61, temperature 36.7 °C (98.1 °F), resp. rate 19, height 1.727 m (5' 8\"), weight 72.6 kg (160 lb), SpO2 100%.  Intake/Output last 3 Shifts:  I/O last 3 completed shifts:  In: 1200 (16.5 mL/kg) [I.V.:800 (11 mL/kg); Other:400]  Out: 2900 (40 mL/kg) [Other:2900]  Weight: 72.6 kg     Relevant Results  Scheduled medications  acetaminophen, 975 mg, oral, q8h  ampicillin-sulbactam, 3 g, intravenous, q24h  aspirin, 81 mg, oral, Daily  B complex-vitamin C-folic acid, 1 capsule, oral, Daily  [Held by provider] bumetanide, 2 mg, oral, Once per day on Sunday Tuesday Thursday Saturday  calcitriol, 0.5 mcg, oral, Daily  carvedilol, 37.5 mg, oral, BID  cinacalcet, 30 mg, oral, Daily  [Held by provider] dexAMETHasone, 1.6 mg, intravenous, q24h  epoetin aida or biosimilar, 20,000 Units, intravenous, Once per day on Tuesday Thursday Saturday  heparin (porcine), 5,000 Units, subcutaneous, q8h  hydrALAZINE, 50 mg, oral, TID  insulin glargine, 5 Units, subcutaneous, Nightly  insulin lispro, 0-10 Units, subcutaneous, TID  isosorbide mononitrate ER, 60 mg, oral, " Daily  lidocaine, 1 patch, transdermal, Daily  metoclopramide, 5 mg, oral, BID   Or  metoclopramide, 5 mg, intravenous, BID  NIFEdipine ER, 30 mg, oral, Once  [START ON 8/31/2024] NIFEdipine ER, 60 mg, oral, Daily before breakfast  pantoprazole, 40 mg, oral, Daily before breakfast  polyethylene glycol, 17 g, oral, Daily  pravastatin, 10 mg, oral, Nightly  predniSONE, 10 mg, oral, q AM  sennosides, 1 tablet, oral, Nightly  tacrolimus, 0.5 mg, oral, q12h ZOË  vancomycin, 125 mg, oral, 4x daily      Continuous medications     PRN medications  PRN medications: dextrose, dextrose, glucagon, glucagon, trimethobenzamide   Results for orders placed or performed during the hospital encounter of 08/24/24 (from the past 24 hour(s))   POCT GLUCOSE   Result Value Ref Range    POCT Glucose 86 74 - 99 mg/dL   POCT GLUCOSE   Result Value Ref Range    POCT Glucose 211 (H) 74 - 99 mg/dL   Troponin I, High Sensitivity   Result Value Ref Range    Troponin I, High Sensitivity (CMC) 21 0 - 53 ng/L   CBC and Auto Differential   Result Value Ref Range    WBC 4.3 (L) 4.4 - 11.3 x10*3/uL    nRBC 0.0 0.0 - 0.0 /100 WBCs    RBC 2.94 (L) 4.50 - 5.90 x10*6/uL    Hemoglobin 8.0 (L) 13.5 - 17.5 g/dL    Hematocrit 24.8 (L) 41.0 - 52.0 %    MCV 84 80 - 100 fL    MCH 27.2 26.0 - 34.0 pg    MCHC 32.3 32.0 - 36.0 g/dL    RDW 12.5 11.5 - 14.5 %    Platelets 143 (L) 150 - 450 x10*3/uL    Neutrophils % 78.7 40.0 - 80.0 %    Immature Granulocytes %, Automated 0.7 0.0 - 0.9 %    Lymphocytes % 11.9 13.0 - 44.0 %    Monocytes % 7.3 2.0 - 10.0 %    Eosinophils % 0.9 0.0 - 6.0 %    Basophils % 0.5 0.0 - 2.0 %    Neutrophils Absolute 3.36 1.20 - 7.70 x10*3/uL    Immature Granulocytes Absolute, Automated 0.03 0.00 - 0.70 x10*3/uL    Lymphocytes Absolute 0.51 (L) 1.20 - 4.80 x10*3/uL    Monocytes Absolute 0.31 0.10 - 1.00 x10*3/uL    Eosinophils Absolute 0.04 0.00 - 0.70 x10*3/uL    Basophils Absolute 0.02 0.00 - 0.10 x10*3/uL   Magnesium   Result Value Ref Range     Magnesium 1.89 1.60 - 2.40 mg/dL   Renal function panel   Result Value Ref Range    Glucose 242 (H) 74 - 99 mg/dL    Sodium 136 136 - 145 mmol/L    Potassium 4.1 3.5 - 5.3 mmol/L    Chloride 99 98 - 107 mmol/L    Bicarbonate 30 21 - 32 mmol/L    Anion Gap 11 10 - 20 mmol/L    Urea Nitrogen 13 6 - 23 mg/dL    Creatinine 5.21 (H) 0.50 - 1.30 mg/dL    eGFR 11 (L) >60 mL/min/1.73m*2    Calcium 9.0 8.6 - 10.6 mg/dL    Phosphorus 2.2 (L) 2.5 - 4.9 mg/dL    Albumin 2.9 (L) 3.4 - 5.0 g/dL   Tacrolimus level   Result Value Ref Range    Tacrolimus  <2.0 <=15.0 ng/mL     *Note: Due to a large number of results and/or encounters for the requested time period, some results have not been displayed. A complete set of results can be found in Results Review.                       Assessment/Plan   Assessment & Plan  Pneumonia symptoms    ESRD (end stage renal disease) on dialysis (Multi)    Tolerated hemodialysis yesterday with net fluid loss 2.1L    Bp elevated with tx (161//103), euvolemic on exam and has stable electrolytes . K+=3.8     Outpatient Dialysis schedule:  TTS [facility]/ Dr Bridges      Access: lt fist with +thrill/bruit- no issues - able to achieve      Anemia of ESRD: epoetin aida (Epogen,Procrit) injection 20,000 Units on dialysis days.. current hgb 7.1.. will cont to monitor..  (Mircera 150mcg q2wks op)      CKD-MBD:    not on Phosphate Binder.. current phos level 2.2.. will cont to monitor..B complex-vitamin C-folic acid (Nephrocaps) capsule 1 capsule daily, calcitriol (Rocaltrol) capsule 0.5 mcg daily, cinacalcet (Sensipar) tablet 30 mg daily     Plan HD tomorrow with UF as tolerated     Renal diet      Please obtain daily standing wt (if possible)     Medication to be adjusted for ESRD      Patient to continue regular HD schedule while inpatient and to follow with the outpatient nephrologist at discharge        ANÍBAL Paredes-CNP

## 2024-08-31 ENCOUNTER — PHARMACY VISIT (OUTPATIENT)
Dept: PHARMACY | Facility: CLINIC | Age: 69
End: 2024-08-31
Payer: MEDICAID

## 2024-08-31 ENCOUNTER — APPOINTMENT (OUTPATIENT)
Dept: DIALYSIS | Facility: HOSPITAL | Age: 69
End: 2024-08-31
Payer: COMMERCIAL

## 2024-08-31 ENCOUNTER — HOME HEALTH ADMISSION (OUTPATIENT)
Dept: HOME HEALTH SERVICES | Facility: HOME HEALTH | Age: 69
End: 2024-08-31
Payer: COMMERCIAL

## 2024-08-31 VITALS
BODY MASS INDEX: 24.25 KG/M2 | HEIGHT: 68 IN | SYSTOLIC BLOOD PRESSURE: 139 MMHG | TEMPERATURE: 97.7 F | HEART RATE: 62 BPM | DIASTOLIC BLOOD PRESSURE: 64 MMHG | RESPIRATION RATE: 17 BRPM | WEIGHT: 160 LBS | OXYGEN SATURATION: 98 %

## 2024-08-31 LAB
ALBUMIN SERPL BCP-MCNC: 3 G/DL (ref 3.4–5)
ANION GAP SERPL CALC-SCNC: 12 MMOL/L (ref 10–20)
BASOPHILS # BLD AUTO: 0.02 X10*3/UL (ref 0–0.1)
BASOPHILS NFR BLD AUTO: 0.4 %
BUN SERPL-MCNC: 23 MG/DL (ref 6–23)
CALCIUM SERPL-MCNC: 8.9 MG/DL (ref 8.6–10.6)
CHLORIDE SERPL-SCNC: 97 MMOL/L (ref 98–107)
CO2 SERPL-SCNC: 28 MMOL/L (ref 21–32)
CREAT SERPL-MCNC: 7.08 MG/DL (ref 0.5–1.3)
EGFRCR SERPLBLD CKD-EPI 2021: 8 ML/MIN/1.73M*2
EOSINOPHIL # BLD AUTO: 0.04 X10*3/UL (ref 0–0.7)
EOSINOPHIL NFR BLD AUTO: 0.9 %
ERYTHROCYTE [DISTWIDTH] IN BLOOD BY AUTOMATED COUNT: 13.2 % (ref 11.5–14.5)
GLUCOSE BLD MANUAL STRIP-MCNC: 129 MG/DL (ref 74–99)
GLUCOSE BLD MANUAL STRIP-MCNC: 235 MG/DL (ref 74–99)
GLUCOSE BLD MANUAL STRIP-MCNC: 296 MG/DL (ref 74–99)
GLUCOSE BLD MANUAL STRIP-MCNC: 364 MG/DL (ref 74–99)
GLUCOSE SERPL-MCNC: 152 MG/DL (ref 74–99)
HCT VFR BLD AUTO: 24.3 % (ref 41–52)
HGB BLD-MCNC: 7.8 G/DL (ref 13.5–17.5)
IMM GRANULOCYTES # BLD AUTO: 0.06 X10*3/UL (ref 0–0.7)
IMM GRANULOCYTES NFR BLD AUTO: 1.3 % (ref 0–0.9)
LYMPHOCYTES # BLD AUTO: 0.66 X10*3/UL (ref 1.2–4.8)
LYMPHOCYTES NFR BLD AUTO: 14.6 %
MAGNESIUM SERPL-MCNC: 1.85 MG/DL (ref 1.6–2.4)
MCH RBC QN AUTO: 27.1 PG (ref 26–34)
MCHC RBC AUTO-ENTMCNC: 32.1 G/DL (ref 32–36)
MCV RBC AUTO: 84 FL (ref 80–100)
MONOCYTES # BLD AUTO: 0.39 X10*3/UL (ref 0.1–1)
MONOCYTES NFR BLD AUTO: 8.6 %
NEUTROPHILS # BLD AUTO: 3.36 X10*3/UL (ref 1.2–7.7)
NEUTROPHILS NFR BLD AUTO: 74.2 %
NRBC BLD-RTO: 0 /100 WBCS (ref 0–0)
PHOSPHATE SERPL-MCNC: 2.5 MG/DL (ref 2.5–4.9)
PLATELET # BLD AUTO: 164 X10*3/UL (ref 150–450)
POTASSIUM SERPL-SCNC: 4 MMOL/L (ref 3.5–5.3)
RBC # BLD AUTO: 2.88 X10*6/UL (ref 4.5–5.9)
SODIUM SERPL-SCNC: 133 MMOL/L (ref 136–145)
TACROLIMUS BLD-MCNC: 5.6 NG/ML
WBC # BLD AUTO: 4.5 X10*3/UL (ref 4.4–11.3)

## 2024-08-31 PROCEDURE — 2500000002 HC RX 250 W HCPCS SELF ADMINISTERED DRUGS (ALT 637 FOR MEDICARE OP, ALT 636 FOR OP/ED)

## 2024-08-31 PROCEDURE — 36415 COLL VENOUS BLD VENIPUNCTURE: CPT

## 2024-08-31 PROCEDURE — 2500000004 HC RX 250 GENERAL PHARMACY W/ HCPCS (ALT 636 FOR OP/ED): Performed by: STUDENT IN AN ORGANIZED HEALTH CARE EDUCATION/TRAINING PROGRAM

## 2024-08-31 PROCEDURE — 2500000001 HC RX 250 WO HCPCS SELF ADMINISTERED DRUGS (ALT 637 FOR MEDICARE OP): Performed by: STUDENT IN AN ORGANIZED HEALTH CARE EDUCATION/TRAINING PROGRAM

## 2024-08-31 PROCEDURE — 85025 COMPLETE CBC W/AUTO DIFF WBC: CPT

## 2024-08-31 PROCEDURE — 2500000004 HC RX 250 GENERAL PHARMACY W/ HCPCS (ALT 636 FOR OP/ED)

## 2024-08-31 PROCEDURE — 80197 ASSAY OF TACROLIMUS: CPT

## 2024-08-31 PROCEDURE — 2500000001 HC RX 250 WO HCPCS SELF ADMINISTERED DRUGS (ALT 637 FOR MEDICARE OP)

## 2024-08-31 PROCEDURE — 8010000001 HC DIALYSIS - HEMODIALYSIS PER DAY

## 2024-08-31 PROCEDURE — 80069 RENAL FUNCTION PANEL: CPT

## 2024-08-31 PROCEDURE — 83735 ASSAY OF MAGNESIUM: CPT

## 2024-08-31 PROCEDURE — RXMED WILLOW AMBULATORY MEDICATION CHARGE

## 2024-08-31 PROCEDURE — 90935 HEMODIALYSIS ONE EVALUATION: CPT | Performed by: INTERNAL MEDICINE

## 2024-08-31 PROCEDURE — 82947 ASSAY GLUCOSE BLOOD QUANT: CPT

## 2024-08-31 PROCEDURE — 99239 HOSP IP/OBS DSCHRG MGMT >30: CPT

## 2024-08-31 RX ORDER — DEXTROSE 50 % IN WATER (D50W) INTRAVENOUS SYRINGE
12.5
Status: DISCONTINUED | OUTPATIENT
Start: 2024-08-31 | End: 2024-08-31 | Stop reason: SDUPTHER

## 2024-08-31 RX ORDER — INSULIN LISPRO 100 [IU]/ML
10 INJECTION, SOLUTION INTRAVENOUS; SUBCUTANEOUS ONCE
Status: COMPLETED | OUTPATIENT
Start: 2024-08-31 | End: 2024-08-31

## 2024-08-31 RX ORDER — DEXTROSE 50 % IN WATER (D50W) INTRAVENOUS SYRINGE
25
Status: DISCONTINUED | OUTPATIENT
Start: 2024-08-31 | End: 2024-08-31 | Stop reason: SDUPTHER

## 2024-08-31 RX ORDER — AMOXICILLIN AND CLAVULANATE POTASSIUM 500; 125 MG/1; MG/1
1 TABLET, FILM COATED ORAL
Status: COMPLETED | OUTPATIENT
Start: 2024-08-31 | End: 2024-08-31

## 2024-08-31 RX ORDER — HYDRALAZINE HYDROCHLORIDE 50 MG/1
50 TABLET, FILM COATED ORAL 3 TIMES DAILY
Qty: 90 TABLET | Refills: 0 | Status: SHIPPED | OUTPATIENT
Start: 2024-08-31 | End: 2024-09-30

## 2024-08-31 RX ORDER — VANCOMYCIN HYDROCHLORIDE 125 MG/1
125 CAPSULE ORAL 4 TIMES DAILY
Qty: 20 CAPSULE | Refills: 0 | Status: SHIPPED | OUTPATIENT
Start: 2024-08-31 | End: 2024-09-13 | Stop reason: HOSPADM

## 2024-08-31 RX ORDER — TACROLIMUS 0.5 MG/1
0.5 CAPSULE, GELATIN COATED ORAL
Qty: 100 CAPSULE | Refills: 0 | Status: SHIPPED | OUTPATIENT
Start: 2024-08-31 | End: 2024-10-20

## 2024-08-31 RX ORDER — PREDNISONE 10 MG/1
TABLET ORAL
Qty: 38 TABLET | Refills: 0 | Status: SHIPPED | OUTPATIENT
Start: 2024-09-01 | End: 2024-11-08

## 2024-08-31 RX ADMIN — ASCORBIC ACID, THIAMINE MONONITRATE,RIBOFLAVIN, NIACINAMIDE, PYRIDOXINE HYDROCHLORIDE, FOLIC ACID, CYANOCOBALAMIN, BIOTIN, CALCIUM PANTOTHENATE, 1 CAPSULE: 100; 1.5; 1.7; 20; 10; 1; 6000; 150000; 5 CAPSULE, LIQUID FILLED ORAL at 11:42

## 2024-08-31 RX ADMIN — PREDNISONE 10 MG: 5 TABLET ORAL at 11:42

## 2024-08-31 RX ADMIN — HYDRALAZINE HYDROCHLORIDE 50 MG: 50 TABLET ORAL at 11:42

## 2024-08-31 RX ADMIN — TACROLIMUS 0.5 MG: 0.5 CAPSULE ORAL at 06:05

## 2024-08-31 RX ADMIN — ACETAMINOPHEN 975 MG: 325 TABLET ORAL at 00:42

## 2024-08-31 RX ADMIN — ISOSORBIDE MONONITRATE 60 MG: 60 TABLET, EXTENDED RELEASE ORAL at 11:49

## 2024-08-31 RX ADMIN — VANCOMYCIN HYDROCHLORIDE 125 MG: 125 CAPSULE ORAL at 17:12

## 2024-08-31 RX ADMIN — VANCOMYCIN HYDROCHLORIDE 125 MG: 125 CAPSULE ORAL at 13:42

## 2024-08-31 RX ADMIN — CARVEDILOL 37.5 MG: 25 TABLET, FILM COATED ORAL at 11:51

## 2024-08-31 RX ADMIN — CINACALCET 30 MG: 30 TABLET, FILM COATED ORAL at 11:42

## 2024-08-31 RX ADMIN — HYDRALAZINE HYDROCHLORIDE 50 MG: 50 TABLET ORAL at 17:13

## 2024-08-31 RX ADMIN — AMOXICILLIN AND CLAVULANATE POTASSIUM 1 TABLET: 500; 125 TABLET, FILM COATED ORAL at 13:42

## 2024-08-31 RX ADMIN — INSULIN LISPRO 10 UNITS: 100 INJECTION, SOLUTION INTRAVENOUS; SUBCUTANEOUS at 01:21

## 2024-08-31 RX ADMIN — AMPICILLIN SODIUM AND SULBACTAM SODIUM 3 G: 2; 1 INJECTION, POWDER, FOR SOLUTION INTRAMUSCULAR; INTRAVENOUS at 11:41

## 2024-08-31 RX ADMIN — VANCOMYCIN HYDROCHLORIDE 125 MG: 125 CAPSULE ORAL at 06:05

## 2024-08-31 RX ADMIN — HEPARIN SODIUM 5000 UNITS: 5000 INJECTION INTRAVENOUS; SUBCUTANEOUS at 06:07

## 2024-08-31 RX ADMIN — CALCITRIOL 0.5 MCG: 0.5 CAPSULE ORAL at 11:54

## 2024-08-31 RX ADMIN — METOCLOPRAMIDE 5 MG: 5 TABLET ORAL at 11:42

## 2024-08-31 RX ADMIN — HEPARIN SODIUM 5000 UNITS: 5000 INJECTION INTRAVENOUS; SUBCUTANEOUS at 13:42

## 2024-08-31 RX ADMIN — PANTOPRAZOLE SODIUM 40 MG: 40 TABLET, DELAYED RELEASE ORAL at 06:05

## 2024-08-31 RX ADMIN — ASPIRIN 81 MG 81 MG: 81 TABLET ORAL at 11:41

## 2024-08-31 RX ADMIN — ACETAMINOPHEN 975 MG: 325 TABLET ORAL at 13:41

## 2024-08-31 ASSESSMENT — PAIN SCALES - GENERAL
PAINLEVEL_OUTOF10: 0 - NO PAIN
PAINLEVEL_OUTOF10: 2

## 2024-08-31 ASSESSMENT — PAIN - FUNCTIONAL ASSESSMENT
PAIN_FUNCTIONAL_ASSESSMENT: 0-10
PAIN_FUNCTIONAL_ASSESSMENT: NO/DENIES PAIN

## 2024-08-31 NOTE — CARE PLAN
The patient's goals for the shift include Patient will tolerate diet with no c/o of N/V Diarrhea this shift    The clinical goals for the shift include Patient will be free from fall/injury during the shift.    Problem: Pain - Adult  Goal: Verbalizes/displays adequate comfort level or baseline comfort level  Outcome: Progressing     Problem: Safety - Adult  Goal: Free from fall injury  Outcome: Progressing     Problem: Discharge Planning  Goal: Discharge to home or other facility with appropriate resources  Outcome: Progressing     Problem: Pain  Goal: Takes deep breaths with improved pain control throughout the shift  Outcome: Progressing  Goal: Turns in bed with improved pain control throughout the shift  Outcome: Progressing

## 2024-08-31 NOTE — NURSING NOTE
Report from Sending RN:    Report From: Enoc  Recent Surgery of Procedure: No  Baseline Level of Consciousness (LOC): A and O x 4  Oxygen Use: No  Type: na  Diabetic: Yes  Last BP Med Given Day of Dialysis: 141/66  Last Pain Med Given: None  Lab Tests to be Obtained with Dialysis: No  Blood Transfusion to be Given During Dialysis: No  Available IV Access: Yes  Medications to be Administered During Dialysis: No  Continuous IV Infusion Running: No  Restraints on Currently or in the Last 24 Hours: No  Hand-Off Communication: Pt remains in isolation for C diff  Dialysis Catheter Dressing: NA, Fistula  Last Dressing Change: NA

## 2024-08-31 NOTE — PROGRESS NOTES
Renal Staff HD Note    I visited and examined the patient on dialysis.   Tolerating treatment without event.    Labs:  BP Readings from Last 3 Encounters:   08/31/24 141/66   08/09/24 114/65   07/10/24 119/60     [unfilled]  Lab Results   Component Value Date    CREATININE 5.21 (H) 08/30/2024    BUN 13 08/30/2024     08/30/2024    K 4.1 08/30/2024    CL 99 08/30/2024    CO2 30 08/30/2024     Lab Results   Component Value Date    .6 (H) 05/01/2024    CALCIUM 9.0 08/30/2024    PHOS 2.2 (L) 08/30/2024     @  Lab Results   Component Value Date    HGB 7.8 (L) 08/31/2024         Meds:   acetaminophen, 975 mg, q8h  ampicillin-sulbactam, 3 g, q24h  aspirin, 81 mg, Daily  B complex-vitamin C-folic acid, 1 capsule, Daily  [Held by provider] bumetanide, 2 mg, Once per day on Sunday Tuesday Thursday Saturday  calcitriol, 0.5 mcg, Daily  carvedilol, 37.5 mg, BID  cinacalcet, 30 mg, Daily  [Held by provider] dexAMETHasone, 1.6 mg, q24h  epoetin aida or biosimilar, 20,000 Units, Once per day on Tuesday Thursday Saturday  heparin (porcine), 5,000 Units, q8h  hydrALAZINE, 50 mg, TID  insulin glargine, 10 Units, Nightly  insulin lispro, 0-10 Units, TID  isosorbide mononitrate ER, 60 mg, Daily  lidocaine, 1 patch, Daily  metoclopramide, 5 mg, BID   Or  metoclopramide, 5 mg, BID  NIFEdipine ER, 60 mg, Daily before breakfast  pantoprazole, 40 mg, Daily before breakfast  polyethylene glycol, 17 g, Daily  pravastatin, 10 mg, Nightly  predniSONE, 10 mg, q AM  sennosides, 1 tablet, Nightly  tacrolimus, 0.5 mg, q12h ZOË  vancomycin, 125 mg, 4x daily         dextrose, 12.5 g, q15 min PRN  dextrose, 25 g, q15 min PRN  glucagon, 1 mg, q15 min PRN  glucagon, 1 mg, q15 min PRN  trimethobenzamide, 200 mg, q6h PRN      @medscheduled@  PRN medications: dextrose, dextrose, glucagon, glucagon, trimethobenzamide  Prior to Admission Medications   Prescriptions Last Dose Informant Patient Reported? Taking?   B complex-vitamin C-folic acid  (Nephrocaps) 1 mg capsule 2024  No Yes   Sig: Take 1 capsule by mouth once daily.   Easy Touch Alcohol Prep Pads pads, medicated  Friend Yes No   NIFEdipine ER (Adalat CC) 60 mg 24 hr tablet 2024  No Yes   Sig: Take 1 tablet (60 mg) by mouth 2 times a day. Do not crush, chew, or split.   Unilet Super Thin Lancets 30 gauge misc  Friend No No   Si each 3 times a day.   acetaminophen (Tylenol) 325 mg tablet 2024 Friend No Yes   Sig: Take 3 tablets (975 mg) by mouth every 6 hours if needed (pain).   aspirin 81 mg chewable tablet 2024 Friend No Yes   Sig: Chew 1 tablet (81 mg) once daily.   bumetanide (Bumex) 2 mg tablet   No No   Sig: Take 1 tablet (2 mg) by mouth 4 times a week. Take bumetanide 2mg once daily on non-dialysis days (, Monday, Wednesday, Friday)   calcitriol (Rocaltrol) 0.5 mcg capsule 2024  No Yes   Sig: Take 1 capsule (0.5 mcg) by mouth once daily.   carvedilol (Coreg) 12.5 mg tablet 2024 Friend No Yes   Sig: TAKE THREE (3) TABLETS BY MOUTH TWICE DAILY   cinacalcet (Sensipar) 30 mg tablet 2024 Friend No Yes   Sig: Take 1 tablet (30 mg) by mouth once daily.   epoetin aida 10,000 unit/mL injection More than a month Friend No No   Sig: Inject 1 mL (10,000 Units) under the skin every 7 days. Do not start before 2024.   Patient not taking: Reported on 2024   glucagon (Gvoke HypoPen 1-Pack) 1 mg/0.2 mL auto-injector Unknown  No No   Sig: Inject 1 mg into the muscle every 15 minutes if needed (For blood glucose 41 to 70 mg/dL and no IV access).   hydrALAZINE (Apresoline) 100 mg tablet  Friend No No   Sig: Take 1 tablet (100 mg) by mouth every 8 hours.   insulin glargine (Lantus) 100 unit/mL (3 mL) pen Past Week  No Yes   Sig: Inject 20 units daily as directed   insulin glargine (Lantus) 100 unit/mL injection   No No   Sig: Inject 8 Units under the skin once every 24 hours for 2 doses. Take as directed per insulin instructions.   insulin lispro  "(HumaLOG) 100 unit/mL injection Past Week Friend Yes Yes   Sig: Inject under the skin 3 times a day with meals. 100-199 2 units 200-299 4 units 300-399 6 units   isosorbide mononitrate ER (Imdur) 60 mg 24 hr tablet 8/23/2024  No Yes   Sig: Take 1 tablet (60 mg) by mouth once daily.   lancets (Unilet Super Thin Lancets) 30 gauge misc  Friend No No   Sig: USE TO TEST BLOOD SUGAR THREE TIMES A DAY   loperamide (Imodium) 2 mg capsule 8/23/2024  No Yes   Sig: Take 1 capsule (2 mg) by mouth 4 times a day as needed for diarrhea.   metoclopramide (Reglan) 5 mg tablet More than a month Friend No No   Sig: Take 1 tablet (5 mg) by mouth 2 times a day. 1 tablet before dinner and 1 tablet at bedtime.   Patient not taking: Reported on 8/24/2024   pantoprazole (ProtoNix) 40 mg EC tablet 8/23/2024 Friend No Yes   Sig: Take 1 tablet (40 mg) by mouth once daily in the morning. Take before meals. Do not crush, chew, or split. Do not start before January 22, 2024.   pen needle, diabetic (TechLITE Pen Needle) 32 gauge x 5/32\" needle   No No   Sig: Use 4 a day as directed   pravastatin (Pravachol) 10 mg tablet 8/23/2024  No Yes   Sig: Take 1 tablet (10 mg) by mouth once daily at bedtime.   predniSONE (Deltasone) 5 mg tablet 8/23/2024  No Yes   Sig: Take 1 tablet (5 mg) by mouth once daily in the morning. Do not start before January 22, 2024.   syringe with needle 3 mL 25 x 5/8\" syringe  Friend No No   Sig: Use to inject epogen.   tacrolimus (Prograf) 1 mg capsule 8/23/2024  No Yes   Sig: Take 1 capsule (1 mg) by mouth 2 times a day.   tacrolimus (Protopic) 0.1 % ointment  Friend No No   Sig: Apply topically 2 times a day.   tacrolimus (Protopic) 0.1 % ointment   No No   Sig: Apply topically 2 times a day.      Facility-Administered Medications: None     Current Facility-Administered Medications   Medication Dose Route Frequency Provider Last Rate Last Admin    acetaminophen (Tylenol) tablet 975 mg  975 mg oral q8h Jackie Vincent MD   865 " mg at 08/31/24 0042    ampicillin-sulbactam (Unasyn) in sodium chloride 0.9 % 100 mL 3 g  3 g intravenous q24h Jackie Vincent MD   Stopped at 08/30/24 1354    aspirin chewable tablet 81 mg  81 mg oral Daily Jose Francisco Oh MD   81 mg at 08/30/24 1144    B complex-vitamin C-folic acid (Nephrocaps) capsule 1 capsule  1 capsule oral Daily Jose Francisco Oh MD   1 capsule at 08/30/24 1143    [Held by provider] bumetanide (Bumex) tablet 2 mg  2 mg oral Once per day on Sunday Tuesday Thursday Saturday Jose Francisco Oh MD        calcitriol (Rocaltrol) capsule 0.5 mcg  0.5 mcg oral Daily Jose Francisco Oh MD   0.5 mcg at 08/30/24 1150    carvedilol (Coreg) tablet 37.5 mg  37.5 mg oral BID Kei Luther DO   37.5 mg at 08/30/24 2122    cinacalcet (Sensipar) tablet 30 mg  30 mg oral Daily Jose Francisco Oh MD   30 mg at 08/30/24 1144    [Held by provider] dexAMETHasone (Decadron) injection 1.6 mg  1.6 mg intravenous q24h Vishal Stephens MD   1.6 mg at 08/29/24 2026    dextrose 50 % injection 12.5 g  12.5 g intravenous q15 min PRN Jose Francisco Oh MD        dextrose 50 % injection 25 g  25 g intravenous q15 min PRN Jose Francisco Oh MD        epoetin aida (Epogen,Procrit) injection 20,000 Units  20,000 Units intravenous Once per day on Tuesday Thursday Saturday ANÍBAL Paredes-CNP   20,000 Units at 08/29/24 2146    glucagon (Glucagen) injection 1 mg  1 mg intramuscular q15 min PRN Jose Francisco Oh MD        glucagon (Glucagen) injection 1 mg  1 mg intramuscular q15 min PRN Jose Francisco Oh MD        heparin (porcine) injection 5,000 Units  5,000 Units subcutaneous q8h Jose Francisco Oh MD   5,000 Units at 08/31/24 0607    hydrALAZINE (Apresoline) tablet 50 mg  50 mg oral TID Jackie Vincent MD   50 mg at 08/30/24 2122    insulin glargine (Lantus) injection 10 Units  10 Units subcutaneous Nightly Sukh Benton MD   10 Units at 08/30/24 2123    insulin lispro (HumaLOG) injection 0-10 Units  0-10 Units  subcutaneous TID Jose Francisco Oh MD   4 Units at 08/30/24 1833    isosorbide mononitrate ER (Imdur) 24 hr tablet 60 mg  60 mg oral Daily Kei Luther DO   60 mg at 08/30/24 1143    lidocaine 4 % patch 1 patch  1 patch transdermal Daily Ekta Quigley MD   1 patch at 08/29/24 0825    metoclopramide (Reglan) tablet 5 mg  5 mg oral BID Vishal Stephens MD   5 mg at 08/29/24 2025    Or    metoclopramide (Reglan) injection 5 mg  5 mg intravenous BID Vishal Stephens MD   5 mg at 08/30/24 2123    NIFEdipine ER (Adalat CC) 24 hr tablet 60 mg  60 mg oral Daily before breakfast Jackie Vincent MD        pantoprazole (ProtoNix) EC tablet 40 mg  40 mg oral Daily before breakfast Jose Francisco Oh MD   40 mg at 08/31/24 0605    polyethylene glycol (Glycolax, Miralax) packet 17 g  17 g oral Daily Jackie Vincent MD        pravastatin (Pravachol) tablet 10 mg  10 mg oral Nightly Jose Francisco Oh MD   10 mg at 08/30/24 2122    predniSONE (Deltasone) tablet 10 mg  10 mg oral q AM Jackie Vincent MD   10 mg at 08/30/24 1338    sennosides (Senokot) tablet 8.6 mg  1 tablet oral Nightly Jackie Vincent MD   8.6 mg at 08/30/24 2123    tacrolimus (Prograf) capsule 0.5 mg  0.5 mg oral q12h [facility] Vishal Stephens MD   0.5 mg at 08/31/24 0605    trimethobenzamide (Tigan) injection 200 mg  200 mg intramuscular q6h PRN Kei Luther DO   200 mg at 08/28/24 1738    vancomycin (Vancocin) capsule 125 mg  125 mg oral 4x daily Greg Samuels MD   125 mg at 08/31/24 0605       Images:  === 08/24/24 ===    US KIDNEY TRANSPLANT    - Impression -  1. Left iliac fossa transplant kidney with mild hydronephrosis and  diffusely echogenic parenchyma. Unremarkable vascular evaluation of  the transplant kidney. Findings are nonspecific but can be seen with  chronic rejection.      I personally reviewed the image(s)/study and resident interpretation  as stated by Dr. Jessenia Merrill MD. I agree with the findings as  stated. This study was interpreted at [facility]  [facility], [place], [place].    MACRO:  None      Signed by: Leo Luke 8/29/2024 10:19 AM  Dictation workstation:   [id number]        ASSESSMENT AND PLAN:  -Continue treatment per submitted orders        Maciel Dominguez MD  Senior Attending Physician  Director of Onco-Nephrology Program  Division of Nephrology & Hypertension  [facility]

## 2024-08-31 NOTE — PROGRESS NOTES
"Manolo Bashir is a 69 y.o. male on day 7 of admission presenting with Pneumonia symptoms.    Subjective   NAEO. Significantly improved nausea and vomiting, tolerating PO.    Objective     Physical Exam  Constitutional:       General: He is not in acute distress.     Comments: Converstional   HENT:      Head: Normocephalic and atraumatic.      Mouth/Throat:      Mouth: Mucous membranes are moist.      Pharynx: Oropharynx is clear. No oropharyngeal exudate or posterior oropharyngeal erythema.   Eyes:      Extraocular Movements: Extraocular movements intact.   Cardiovascular:      Rate and Rhythm: Normal rate and regular rhythm.      Pulses: Normal pulses.      Heart sounds: Normal heart sounds. No murmur heard.     No gallop.   Pulmonary:      Effort: Pulmonary effort is normal. No respiratory distress.      Breath sounds: No wheezing. Rales: bilateral in the bases.  Abdominal:      General: Abdomen is flat. There is no distension.      Palpations: Abdomen is soft.      Tenderness: There is no abdominal tenderness.   Musculoskeletal:         General: Tenderness (to palpation of the lower back - unchanged) present. No swelling.      Right lower leg: No edema.      Left lower leg: No edema.   Skin:     General: Skin is warm and dry.   Neurological:      General: No focal deficit present.      Mental Status: He is alert and oriented to person, place, and time.         Last Recorded Vitals  Blood pressure 141/66, pulse 61, temperature 36.1 °C (97 °F), temperature source Temporal, resp. rate 18, height 1.727 m (5' 8\"), weight 72.6 kg (160 lb), SpO2 100%.  Intake/Output last 3 Shifts:  I/O last 3 completed shifts:  In: 700 (9.6 mL/kg) [I.V.:600 (8.3 mL/kg); IV Piggyback:100]  Out: - (0 mL/kg)   Weight: 72.6 kg     Relevant Results  Results for orders placed or performed during the hospital encounter of 08/24/24 (from the past 24 hour(s))   POCT GLUCOSE   Result Value Ref Range    POCT Glucose 232 (H) 74 - 99 mg/dL   POCT " GLUCOSE   Result Value Ref Range    POCT Glucose 245 (H) 74 - 99 mg/dL   POCT GLUCOSE   Result Value Ref Range    POCT Glucose 364 (H) 74 - 99 mg/dL   POCT GLUCOSE   Result Value Ref Range    POCT Glucose 296 (H) 74 - 99 mg/dL   CBC and Auto Differential   Result Value Ref Range    WBC 4.5 4.4 - 11.3 x10*3/uL    nRBC 0.0 0.0 - 0.0 /100 WBCs    RBC 2.88 (L) 4.50 - 5.90 x10*6/uL    Hemoglobin 7.8 (L) 13.5 - 17.5 g/dL    Hematocrit 24.3 (L) 41.0 - 52.0 %    MCV 84 80 - 100 fL    MCH 27.1 26.0 - 34.0 pg    MCHC 32.1 32.0 - 36.0 g/dL    RDW 13.2 11.5 - 14.5 %    Platelets 164 150 - 450 x10*3/uL    Neutrophils % 74.2 40.0 - 80.0 %    Immature Granulocytes %, Automated 1.3 (H) 0.0 - 0.9 %    Lymphocytes % 14.6 13.0 - 44.0 %    Monocytes % 8.6 2.0 - 10.0 %    Eosinophils % 0.9 0.0 - 6.0 %    Basophils % 0.4 0.0 - 2.0 %    Neutrophils Absolute 3.36 1.20 - 7.70 x10*3/uL    Immature Granulocytes Absolute, Automated 0.06 0.00 - 0.70 x10*3/uL    Lymphocytes Absolute 0.66 (L) 1.20 - 4.80 x10*3/uL    Monocytes Absolute 0.39 0.10 - 1.00 x10*3/uL    Eosinophils Absolute 0.04 0.00 - 0.70 x10*3/uL    Basophils Absolute 0.02 0.00 - 0.10 x10*3/uL     *Note: Due to a large number of results and/or encounters for the requested time period, some results have not been displayed. A complete set of results can be found in Results Review.       Electrocardiogram, 12-lead PRN ACS symptoms  Result Date: 8/26/2024  Sinus rhythm with frequent Premature ventricular complexes Nonspecific intraventricular block Cannot rule out Anterior infarct , age undetermined Abnormal ECG When compared with ECG of 24-AUG-2024 12:11, Premature ventricular complexes are now Present QRS axis Shifted left Nonspecific T wave abnormality no longer evident in Lateral leads    ECG 12 lead  Result Date: 8/24/2024  Normal sinus rhythm Nonspecific intraventricular block Minimal voltage criteria for LVH, may be normal variant ( [place] product ) Abnormal ECG When compared  with ECG of 26-JUN-2024 08:26, No significant change was found See ED provider note for full interpretation and clinical correlation Confirmed by Faustina Workman ([id number]) on 8/24/2024 9:19:13 PM    CT abdomen pelvis wo IV contrast  Result Date: 8/24/2024  FINDINGS: Please note that the evaluation of vessels, lymph nodes and organs is limited without intravenous contrast.   LOWER CHEST: Moderate right pleural effusion. Small left pleural effusion. Mild adjacent lung atelectasis is noted. A few ground-glass opacities noted in the bilateral lower lobes which can be seen in the setting of pulmonary edema.. The heart is enlarged.  No evidence of pericardial effusion.   ABDOMEN:   LIVER: The liver is normal in size without evidence of focal lesions.   BILE DUCTS: The intrahepatic and extrahepatic ducts are not dilated.   GALLBLADDER: The gallbladder is not definitively visualized and may be decompressed versus surgically absent.   PANCREAS: The pancreas appears unremarkable without evidence of ductal dilatation or masses.   SPLEEN: The spleen is normal in size with no evidence of focal lesions.   ADRENAL GLANDS: Bilateral adrenal glands appear normal.   KIDNEYS AND URETERS: The bilateral kidneys are atrophic. Left iliac fossa renal transplant without associated perinephric fluid collection or hydroureteronephrosis..   PELVIS:   BLADDER: The urinary bladder is decompressed, limited for evaluation. There is mild bladder wall thickening, similar to prior, and suspected to be secondary to bladder decompression.   REPRODUCTIVE ORGANS: The prostate is not enlarged.   BOWEL: The stomach is unremarkable. The small bowel is normal in caliber without evidence of wall thickening throughout. Status post partial colectomy. The large bowel is otherwise normal in caliber and demonstrates no abnormal wall thickening. The appendix is not definitely visualized. There is however no pericecal stranding or fluid.   VESSELS: Diffuse vascular  calcifications throughout the abdomen. The aorta and IVC otherwise unremarkable.   PERITONEUM/RETROPERITONEUM/LYMPH NODES: There is no free or loculated fluid collection, no free intraperitoneal air. The retroperitoneum appears normal.  No abdominopelvic lymphadenopathy is present.   ABDOMINAL WALL: The abdominal wall soft tissues appear normal.   BONES: No suspicious osseous lesions are identified. Degenerative discogenic disease is noted in the lower thoracic and lumbar spine. Mild compression deformity of the T10 vertebral body is likely degenerative.     1.  Bilateral pleural effusions right-greater-than-left with the ground-glass opacities in the bilateral lower lobes. Findings can be seen with pulmonary edema.. 2. No acute pathology within the abdomen or pelvis to explain right-sided abdominal pain.      XR chest 1 view  Result Date: 8/24/2024  FINDINGS: Sizable area of right basilar consolidation consistent with pneumonia.   There is increased generalized prominence of the perihilar and basilar interstitium and a superimposed component of edema suggested     Sizable area of right basilar consolidation consistent with pneumonia.   There is increased generalized prominence of the perihilar and basilar interstitium and a superimposed component of edema suggested                         Assessment/Plan   Assessment & Plan  ESRD (end stage renal disease) on dialysis (Multi)    Manolo Bashir is a 69 y.o. male with PMHx of ESRD (s/p renal transplant x2), prostate cancer (s/p radical prostatectomy), T2DM, MGUS, HTN who presented with features suggestive of pneumonia and gastroparesis.      Updates 08/31/24:  - Discharge to home today  - Home on home lantus, prednisone taper per transplant  - Complete PO vanc course     #Pneumonia  :: Has had multiple hospital admissions for pneumonia.  :: CXR 08/24 demonstrated right basilar consolidation; CT AP w/o contrast shows bilateral plural effusion and right basilar  consolidation.  :: Started on cefepime and azithromycin in the ED   - Dc'd cefepime 8/25 d/t increased risk of neurotoxicity iso ESRD   - Completed 3 day course of azithromycin on 8/26   - Received IV Vanc (8/26-8/28)   - Received IV Zosyn (8/25-8/28)  - Unasyn for 2 more days (8/30-8/31)  :: Urine Strept & Legionella antigens negative  :: Pro-calcitonin elevated at 0.25 (8/25), 0.86 (8/28)  :: MRSA probe negative  :: Blood cultures    -8/24 - NGTD   -8/25 - NGTD  :: Thoracentesis (8/26) w/ IR  - Lights Criteria by LDH and protein  - Culture data NGTD  - Finish Unasyn tomorrow 8/31    #Chest pain   ::Non-radiating left sided dull chest pain  :: troponin 8/27 wnl  :: last stress test 2/2021 unremarkable  - repeat EKG stable  - continuous telemetry refused by patient despite patient education  - outpatient stress test     #Diarrhea, resolved  :: Chronic, waxes and wanes; dates back to AUS placement in Feb. 2023 that got infected.  :: H/o right hemicolectomy (2017).  :: Has been controlled with Loperamide.  :: Colonoscopy on 8/2023 - No polyps found. Repeat surveillance in 7 yrs (2030).   :: C. Diff PCR (+), EIA (-), currently receiving PO Vanc for decolonization    - Hold Loperamide.  -stool pathogen panel negative    - PO Vanc (started 8/25), end date of 9/7/24     #Nausea & Vomiting  :: Has gastroparesis; confirmed with [facility] 04/24.  :: Has been on Reglan 5mg bid (stopped recently), ProtoNix 40mg dly.  - Hold Reglan.  - Continue ProtoNix 40mg dly.  - Restarted metoclopramide 8/29  - Discontinue dexamethasone 1.6 mg IV     #ESRD s/p renal transplant (X2)  #Hyponatremia  :: Renal transplants in 1992 and 2013;  :: Now with impaired allograft function currently on immunosuppression (on Tacrolimus and Prednisone).  :: Baseline Cr 2.5 - 3, makes urine  :: On TThS iHD schedule; last session (08/24/24)  :: Nephrology following, appreciate recs   - Continue iHD schedule of TThS  :: Transplant nephrology has been following and  are aware of the current admission.  :: New left pelvic abdominal pain 8/28, notified transplant nephrology  - Recommended - renal transplant U/S, prednisone increased from 5 mg to 10 mg daily, and restarting tacrolimus at 1 mg daily   :: Tacrolimus level undetectable 8/30  - Hold Bumex   - Continue tacrolimus 0.5 mg BID  - Continue Prednisone 10 mg once daily x2 weeks then decrease to 5 mg daily  - Strict I&Os  - Follow daily RFP  - Outpatient follow up with transplant nephrology in 1-2 months    #Asymptomatic bacteriuria    :: UA (8/25) with 1+ bacteria, proteinuria, otherwise unremarkable  - No need for further antibiotics, will follow if symptomatic    #Chronic anemia, stable  :: Likely 2/2 to ESRD as above  :: Baseline Hgb ~7-9  - Epogen/procrit on dialysis days per nephrology   - Continue to monitor with daily CBC    #T2DM  :: Last A1C 6.6 (7/10/24).  :: Home regimen: insulin glargine 20 units daily and SSI.  :: Patient not eating as usual 2/2 to gastroparesis as above.  - Decreased basal Lantus to 5 units 8/28 with intolerance of PO intake, will increase dose based on the AM BG as needed.   - Continue on SSI #2.     #Chronic HTN  :: H/o multiple prior admissions for hypervolemia and hypertensive urgency.   :: Has been on (Hydralazine 100 mg TID, carvedilol 37.5 mg, BID, Nifedipine 60 mg daily, imdur 60 mg nightly, Bumex 2 mg QID on non iHD days).  :: /79 on presentation, continues to be labile   - Continue home Coreg and Imdur.  - Restart nifedipine 60 mg daily  - PO Hydralazine 50 mg TID  - Hold Bumex.     #Chronic lower back pain  - Encourage patient to be out of bed to chair 3 times daily with meals.  - Tylenol 975 mg q8h   - Outpatient follow up      ---------------------------------------------------------------------------  Fluids: PRN  Electrolytes: PRN  Nutrition: Adult diet Renal; Potassium Restricted 2 gm (50mEq); 2 - 3 grams Sodium  Access: PIV     DVT prophylaxis: SQH, SCD  GI Ppx:  Pantoprazole      Abx: Unasyn, PO Vancomycin    O2: RA     Pain regimen: Scheduled Tylenol  GI Laxative: None     Code Status: Full Code (Confirmed on admission)   Emergency Contact / NOK: Extended Emergency Contact Information  Primary Emergency Contact: KETTY PLASENCIA  Address: [address] [place] [address] [facility]  Home Phone: [phone]  Work Phone: [phone]  Mobile Phone: [phone]  Relation: Friend  Preferred language: English  Secondary Emergency Contact: CLEMENTE ROB  Address: [address] [place]           [place] [address] [facility]  Home Phone: [phone]  Mobile Phone: [phone]  Relation: Daughter           Kei Luther DO  PGY-1 Internal Medicine     Jackie Vincent MD  Internal Medicine and Pediatrics, PGY-2  [place]

## 2024-08-31 NOTE — CARE PLAN
The patient's goals for the shift include Patient will tolerate diet with no c/o of N/V Diarrhea this shift    The clinical goals for the shift include pt will br free from injury duing this shify    Other goals include:  Problem: Fall/Injury  Goal: Not fall by end of shift  8/31/2024 1659 by Elvira Knox RN  Outcome: Progressing  8/31/2024 1659 by Elvira Knox RN  Outcome: Progressing  Goal: Be free from injury by end of the shift  8/31/2024 1659 by Elvira Knox RN  Outcome: Progressing  8/31/2024 1659 by Elvira Knox RN  Outcome: Progressing  Goal: Verbalize understanding of personal risk factors for fall in the hospital  8/31/2024 1659 by Elvira Knox RN  Outcome: Progressing  8/31/2024 1659 by Elvira Knox RN  Outcome: Progressing  Goal: Verbalize understanding of risk factor reduction measures to prevent injury from fall in the home  8/31/2024 1659 by Elvira Knox RN  Outcome: Progressing  8/31/2024 1659 by Elvira Knox RN  Outcome: Progressing  Goal: Use assistive devices by end of the shift  8/31/2024 1659 by Elvira Knox RN  Outcome: Progressing  8/31/2024 1659 by Elvira Knox RN  Outcome: Progressing  Goal: Pace activities to prevent fatigue by end of the shift  8/31/2024 1659 by Elvira Knox RN  Outcome: Progressing  8/31/2024 1659 by Elvira Knox RN  Outcome: Progressing     Problem: Pain - Adult  Goal: Verbalizes/displays adequate comfort level or baseline comfort level  8/31/2024 1659 by Elvira Knox RN  Outcome: Progressing  8/31/2024 1659 by Elvira Knox RN  Outcome: Progressing     Problem: Safety - Adult  Goal: Free from fall injury  8/31/2024 1659 by Elvira Knox RN  Outcome: Progressing  8/31/2024 1659 by Elvira Knox RN  Outcome: Progressing     Problem: Discharge Planning  Goal: Discharge to home or other facility with appropriate resources  8/31/2024 1659 by Elvira Knox RN  Outcome: Progressing  8/31/2024 1659 by Elvira Knox RN  Outcome: Progressing      Problem: Chronic Conditions and Co-morbidities  Goal: Patient's chronic conditions and co-morbidity symptoms are monitored and maintained or improved  8/31/2024 1659 by Elvira Knox RN  Outcome: Progressing  8/31/2024 1659 by Elvira Knox RN  Outcome: Progressing     Problem: Pain  Goal: Takes deep breaths with improved pain control throughout the shift  8/31/2024 1659 by Elvira Knox RN  Outcome: Progressing  8/31/2024 1659 by Elvira Knox RN  Outcome: Progressing  Goal: Turns in bed with improved pain control throughout the shift  8/31/2024 1659 by Elvira Knox RN  Outcome: Progressing  8/31/2024 1659 by Elvira Knox RN  Outcome: Progressing  Goal: Walks with improved pain control throughout the shift  8/31/2024 1659 by Elvira Knox RN  Outcome: Progressing  8/31/2024 1659 by Elvira Knox RN  Outcome: Progressing  Goal: Performs ADL's with improved pain control throughout shift  8/31/2024 1659 by Elvira Knox RN  Outcome: Progressing  8/31/2024 1659 by Elvira Knox RN  Outcome: Progressing  Goal: Participates in PT with improved pain control throughout the shift  8/31/2024 1659 by Elvira Knox RN  Outcome: Progressing  8/31/2024 1659 by Elvira Knox RN  Outcome: Progressing  Goal: Free from opioid side effects throughout the shift  8/31/2024 1659 by Elvira Knox RN  Outcome: Progressing  8/31/2024 1659 by Elvira Knox RN  Outcome: Progressing  Goal: Free from acute confusion related to pain meds throughout the shift  8/31/2024 1659 by Elvira Knox RN  Outcome: Progressing  8/31/2024 1659 by Elvira Knox RN  Outcome: Progressing     Problem: Diabetes  Goal: Achieve decreasing blood glucose levels by end of shift  8/31/2024 1659 by Elvira Knox RN  Outcome: Progressing  8/31/2024 1659 by Elvira Knox RN  Outcome: Progressing  Goal: Increase stability of blood glucose readings by end of shift  8/31/2024 1659 by Elvira Knox RN  Outcome: Progressing  8/31/2024 1659 by Elvira  AYO Knox  Outcome: Progressing  Goal: Decrease in ketones present in urine by end of shift  8/31/2024 1659 by Elvira Knox RN  Outcome: Progressing  8/31/2024 1659 by Elvira Knox RN  Outcome: Progressing  Goal: Maintain electrolyte levels within acceptable range throughout shift  8/31/2024 1659 by Elvira Knox RN  Outcome: Progressing  8/31/2024 1659 by Elvira Knox RN  Outcome: Progressing  Goal: Maintain glucose levels >70mg/dl to <250mg/dl throughout shift  8/31/2024 1659 by Elvira Knox RN  Outcome: Progressing  8/31/2024 1659 by Elvira Knox RN  Outcome: Progressing  Goal: No changes in neurological exam by end of shift  8/31/2024 1659 by Elvira Knox RN  Outcome: Progressing  8/31/2024 1659 by Elvira Knox RN  Outcome: Progressing  Goal: Learn about and adhere to nutrition recommendations by end of shift  8/31/2024 1659 by Elvira Knox RN  Outcome: Progressing  8/31/2024 1659 by Elvira Knox RN  Outcome: Progressing  Goal: Vital signs within normal range for age by end of shift  8/31/2024 1659 by Elvira Knox RN  Outcome: Progressing  8/31/2024 1659 by Elvira Knox RN  Outcome: Progressing  Goal: Increase self care and/or family involovement by end of shift  8/31/2024 1659 by Elvira Knox RN  Outcome: Progressing  8/31/2024 1659 by Elvira Knox RN  Outcome: Progressing  Goal: Receive DSME education by end of shift  8/31/2024 1659 by Elvira Knox RN  Outcome: Progressing  8/31/2024 1659 by Elvira Knox RN  Outcome: Progressing

## 2024-08-31 NOTE — NURSING NOTE
Report to Receiving RN:    Report To: AYO Graham  Time Report Called: 103  Hand-Off Communication: Pt completed 3 hrs 45 mins, 1.5L removed, /88, HR 72, no issues, pt stable.  Complications During Treatment: No  Ultrafiltration Treatment: Yes  Medications Administered During Dialysis: No  Blood Products Administered During Dialysis: No  Labs Sent During Dialysis: No      Last Updated: 10:36 AM by SUJIT WAYNE

## 2024-08-31 NOTE — PROGRESS NOTES
08/31/24 1253   Discharge Planning   Who is requesting discharge planning?   ([facility] [place])   Home or Post Acute Services In home services   Type of Home Care Services Home PT;Home OT   Expected Discharge Disposition  Services   Does the patient need discharge transport arranged? No       Patient will discharge to home. Final home care orders sent to [facility] [place]. SOC confirmed for Tuesday 9/3. Family will provide transportation to home.

## 2024-09-01 NOTE — DISCHARGE SUMMARY
Discharge Diagnosis  Pneumonia symptoms    Issues Requiring Follow-Up  C/f possible HPV by wound care - further assess    To-Do  - Outpatient follow up with Primary Care for chronic low back pain, HTN, T2DM  - Outpatient follow up with Nephrology and resume outpatient dialysis schedule  - Outpatient follow up with Transplant Nephrology in 1-2 months  - Outpatient follow up for cardiac stress test     Discharge Meds     Your medication list        START taking these medications        Instructions Last Dose Given Next Dose Due   metoclopramide 5 mg tablet  Commonly known as: Reglan      Take 1 tablet (5 mg) by mouth 2 times a day. 1 tablet before dinner and 1 tablet at bedtime.       vancomycin 125 mg capsule  Commonly known as: Vancocin      Take 1 capsule (125 mg) by mouth 4 times a day for 5 days.              CHANGE how you take these medications        Instructions Last Dose Given Next Dose Due   hydrALAZINE 50 mg tablet  Commonly known as: Apresoline  What changed:   medication strength  how much to take  when to take this      Take 1 tablet (50 mg) by mouth 3 times a day.       Lantus Solostar U-100 Insulin 100 unit/mL (3 mL) pen  Generic drug: insulin glargine  What changed: Another medication with the same name was removed. Continue taking this medication, and follow the directions you see here.      Inject 20 units daily as directed       predniSONE 10 mg tablet  Commonly known as: Deltasone  Start taking on: September 1, 2024  What changed:   medication strength  See the new instructions.      Take 1 tablet (10 mg) by mouth once daily in the morning for 8 days, THEN 0.5 tablets (5 mg) once daily in the morning.       tacrolimus 0.1 % ointment  Commonly known as: Protopic  What changed: Another medication with the same name was changed. Make sure you understand how and when to take each.      Apply topically 2 times a day.       tacrolimus 0.1 % ointment  Commonly known as: Protopic  What changed: Another  "medication with the same name was changed. Make sure you understand how and when to take each.      Apply topically 2 times a day.       tacrolimus 0.5 mg capsule  Commonly known as: Prograf  What changed:   medication strength  how much to take  when to take this      Take 1 capsule (0.5 mg) by mouth every 12 hours.              CONTINUE taking these medications        Instructions Last Dose Given Next Dose Due   acetaminophen 325 mg tablet  Commonly known as: Tylenol      Take 3 tablets (975 mg) by mouth every 6 hours if needed (pain).       aspirin 81 mg chewable tablet      Chew 1 tablet (81 mg) once daily.       BD Luer-Rita Syringe 3 mL 25 x 5/8\" syringe  Generic drug: syringe with needle      Use to inject epogen.       bumetanide 2 mg tablet  Commonly known as: Bumex      Take 1 tablet (2 mg) by mouth 4 times a week. Take bumetanide 2mg once daily on non-dialysis days (Sunday, Monday, Wednesday, Friday)       calcitriol 0.5 mcg capsule  Commonly known as: Rocaltrol      Take 1 capsule (0.5 mcg) by mouth once daily.       carvedilol 12.5 mg tablet  Commonly known as: Coreg      TAKE THREE (3) TABLETS BY MOUTH TWICE DAILY       cinacalcet 30 mg tablet  Commonly known as: Sensipar      Take 1 tablet (30 mg) by mouth once daily.       Easy Touch Alcohol Prep Pads pads, medicated  Generic drug: alcohol swabs           Gvoke HypoPen 1-Pack 1 mg/0.2 mL auto-injector  Generic drug: glucagon      Inject 1 mg into the muscle every 15 minutes if needed (For blood glucose 41 to 70 mg/dL and no IV access).       insulin lispro 100 unit/mL injection  Commonly known as: HumaLOG           isosorbide mononitrate ER 60 mg 24 hr tablet  Commonly known as: Imdur      Take 1 tablet (60 mg) by mouth once daily.       NIFEdipine ER 60 mg 24 hr tablet  Commonly known as: Adalat CC      Take 1 tablet (60 mg) by mouth 2 times a day. Do not crush, chew, or split.       pantoprazole 40 mg EC tablet  Commonly known as: ProtoNix      Take " "1 tablet (40 mg) by mouth once daily in the morning. Take before meals. Do not crush, chew, or split. Do not start before January 22, 2024.       pravastatin 10 mg tablet  Commonly known as: Pravachol      Take 1 tablet (10 mg) by mouth once daily at bedtime.       Renal Caps 1 mg capsule  Generic drug: B complex-vitamin C-folic acid      Take 1 capsule by mouth once daily.       TRUEplus Pen Needle 32 gauge x 5/32\" needle  Generic drug: pen needle, diabetic      Use 4 a day as directed       Unilet Super Thin Lancets 30 gauge misc  Generic drug: lancets      USE TO TEST BLOOD SUGAR THREE TIMES A DAY       Unilet Super Thin Lancets 30 gauge misc  Generic drug: lancets      1 each 3 times a day.              STOP taking these medications      loperamide 2 mg capsule  Commonly known as: Imodium               ASK your doctor about these medications        Instructions Last Dose Given Next Dose Due   Epogen 10,000 unit/mL injection  Generic drug: epoetin aida      Inject 1 mL (10,000 Units) under the skin every 7 days. Do not start before April 17, 2024.                 Where to Get Your Medications        [facility]  [address], [address], [facility] [address]      Hours: 8AM to 6PM Mon-Fri, 8AM to 4PM Sat, 9AM to 1PM Sun Phone: [phone]   hydrALAZINE 50 mg tablet  predniSONE 10 mg tablet  tacrolimus 0.5 mg capsule  vancomycin 125 mg capsule         Test Results Pending At Discharge  Pending Labs       Order Current Status    Body Fluid Cell Count With Differential In process            Hospital Course  Manolo Bashir is a 69 y.o. male who presented with dry cough and vomiting and had a 1-day uncomplicated hospitalization for Pneumonia symptoms.     He has a PMHx of ESRD (on dialysis; TThS schedule; last session earlier today 08/24; s/p 2x renal transplant - 1992, 2013), now with impaired allograft function currently on immunosuppression (prednisone, " tacrolimus), IgG and IgA lambda MGUS (recent hematologic eval including Bmbx with no e/o myeloma), HTN, prostate cancer (s/p radical prostatectomy), HCV (s/p rx; PCR neg 10/2023), and gastroparesis and presented to the ED on 08/24/24 with dry cough and vomiting.    In the ED, patient was febrile (38), hypertensive (171/79) - patient reportedly did not take his home HTN medications that day. HR (70s) EKG with NSR. Sating well on RA. Labs with no leukocytosis only significant for microcytic anemia of (8.4; baseline 7.2-8.7, thrombocytopenia (123), Flu and COVID are negative. Had CXR which demonstrated right basilar consolidation and CT AP w/o contrast only demonstrated bilateral plural effusion and right basilar consolidation. He was started on cefepime and azithromycin in the ED and transferred to the floor same day.    C. Diff testing PCR (+), EIA (-) in the setting of diarrhea. On the floor, he was commenced on PO Vancomycin for c. Diff decolonization and IV Zosyn (8/25-8/28) transitioned to Unasyn 8/28, with an initial 3-day course of Azithromycin for suspected pneumonia. He was also given erythromycin, which was transitioned to metoclopramide, for gastroparesis.    He had labile BP throughout his course. He arrive hypertensive and was hypotensive on 8/26 with concerns that erythromycin had increased effects of home nifedipine.     Patient was resumed on home hypertensive medication regimen of carvedilol, imdur, and nifedipine and home dose, with reduction in hydralazine dose to 50 mg TID prior to discharge.       As at the time of discharge to home on 09/01/24, patient has symptomatically improved and has shown sustained clinical improvement.           Pertinent Physical Exam At Time of Discharge  Physical Exam  Constitutional:       General: He is not in acute distress.  HENT:      Head: Normocephalic and atraumatic.      Mouth/Throat:      Mouth: Mucous membranes are moist.      Pharynx: Oropharynx is clear. No  oropharyngeal exudate or posterior oropharyngeal erythema.   Cardiovascular:      Rate and Rhythm: Normal rate and regular rhythm.      Pulses: Normal pulses.      Heart sounds: Normal heart sounds. No murmur heard.     No gallop.   Pulmonary:      Effort: Pulmonary effort is normal. No respiratory distress.      Breath sounds: Rales (bilateral in the bases.) present. No wheezing or rhonchi.   Abdominal:      General: Abdomen is flat. Bowel sounds are normal. There is no distension.      Palpations: Abdomen is soft.      Tenderness: There is no abdominal tenderness.   Musculoskeletal:         General: Tenderness (to palpation of the lower back - unchanged) present. No swelling.      Right lower leg: No edema.      Left lower leg: No edema.   Skin:     General: Skin is warm and dry.   Neurological:      General: No focal deficit present.      Mental Status: He is alert and oriented to person, place, and time.         Outpatient Follow-Up  Future Appointments   Date Time Provider Department Center   9/23/2024 12:30 PM [facility] [place] [facility] 4 [facility]   9/25/2024  9:00 AM Cali Mai MD [id number] [facility]   10/2/2024 10:00 AM Elma Hutton, APRN-CNP, DNP [id number] [facility]   10/3/2024  3:00 PM Elías Penn APRN-CNP [id number] [facility]   10/16/2024 10:30 AM Vinicius Araiza MD [id number] [facility]   10/18/2024  8:30 AM Fermin Fernandez DO [id number] [place]   10/28/2024  1:30 PM Kurtis Jc MD [id number] [facility]   12/2/2024 10:45 AM Daxa Cote DPM [id number] [place]   12/11/2024 10:40 AM Estella Quinonez MD [id number] [facility]       Kei Luther DO  PGY-1 Internal Medicine

## 2024-09-03 ENCOUNTER — PATIENT OUTREACH (OUTPATIENT)
Dept: CARE COORDINATION | Facility: CLINIC | Age: 69
End: 2024-09-03
Payer: COMMERCIAL

## 2024-09-03 SDOH — ECONOMIC STABILITY: GENERAL: WOULD YOU LIKE HELP WITH ANY OF THE FOLLOWING NEEDS?: I DONT NEED HELP WITH ANY OF THESE

## 2024-09-03 NOTE — SIGNIFICANT EVENT
09/03/24 1301   Social Determinants of Health- Help Requested   Would you like help with any of the following needs? I dont need help with any of these

## 2024-09-03 NOTE — PROGRESS NOTES
Outreach call to patient to support a smooth transition of care from recent admission.  Spoke with patient, reviewed discharge medications, discharge instructions, assessed social needs, and provided education on importance of follow-up appointment with provider. Enrolled patient in [employer] for additional support and education through transition period.  Will continue to monitor through transition period.   Engagement  Call Start Time: 1259 (9/3/2024 12:59 PM)    Medications  Medications reviewed with patient/caregiver?: Yes (9/3/2024 12:59 PM)  Is the patient having any side effects they believe may be caused by any medication additions or changes?: No (9/3/2024 12:59 PM)  Does the patient have all medications ordered at discharge?: Yes (9/3/2024 12:59 PM)  Is the patient taking all medications as directed (includes completed medication regime)?: Yes (9/3/2024 12:59 PM)    Appointments  Does the patient have a primary care provider?: Yes (9/3/2024 12:59 PM)  Care Management Interventions: Educated patient on importance of making appointment; Advised patient to make appointment (9/3/2024 12:59 PM)    Patient Teaching  Does the patient have access to their discharge instructions?: Yes (9/3/2024 12:59 PM)  What is the patient's perception of their health status since discharge?: Improving (9/3/2024 12:59 PM)  Is the patient/caregiver able to teach back the hierarchy of who to call/visit for symptoms/problems? PCP, Specialist, Home Health nurse, Urgent Care, ED, 911: Yes (9/3/2024 12:59 PM)    Wrap Up  Call End Time: 1300 (9/3/2024 12:59 PM)    Erika Dawkins RN [facility]  [phone]

## 2024-09-04 ENCOUNTER — HOME CARE VISIT (OUTPATIENT)
Dept: HOME HEALTH SERVICES | Facility: HOME HEALTH | Age: 69
End: 2024-09-04
Payer: COMMERCIAL

## 2024-09-04 VITALS
TEMPERATURE: 97.4 F | OXYGEN SATURATION: 100 % | WEIGHT: 154.32 LBS | BODY MASS INDEX: 23.46 KG/M2 | RESPIRATION RATE: 18 BRPM | HEART RATE: 74 BPM | DIASTOLIC BLOOD PRESSURE: 60 MMHG | SYSTOLIC BLOOD PRESSURE: 120 MMHG

## 2024-09-04 PROCEDURE — G0299 HHS/HOSPICE OF RN EA 15 MIN: HCPCS

## 2024-09-04 ASSESSMENT — ACTIVITIES OF DAILY LIVING (ADL): ENTERING_EXITING_HOME: SUPERVISION

## 2024-09-04 ASSESSMENT — ENCOUNTER SYMPTOMS: DENIES PAIN: 1

## 2024-09-06 ENCOUNTER — HOME CARE VISIT (OUTPATIENT)
Dept: HOME HEALTH SERVICES | Facility: HOME HEALTH | Age: 69
End: 2024-09-06
Payer: COMMERCIAL

## 2024-09-07 ENCOUNTER — PHARMACY VISIT (OUTPATIENT)
Dept: PHARMACY | Facility: CLINIC | Age: 69
End: 2024-09-07
Payer: MEDICAID

## 2024-09-07 PROCEDURE — RXMED WILLOW AMBULATORY MEDICATION CHARGE

## 2024-09-07 ASSESSMENT — ENCOUNTER SYMPTOMS
APPETITE LEVEL: GOOD
LAST BOWEL MOVEMENT: 67087

## 2024-09-07 ASSESSMENT — ACTIVITIES OF DAILY LIVING (ADL): OASIS_M1830: 01

## 2024-09-10 ENCOUNTER — PHARMACY VISIT (OUTPATIENT)
Dept: PHARMACY | Facility: CLINIC | Age: 69
End: 2024-09-10

## 2024-09-10 ENCOUNTER — APPOINTMENT (OUTPATIENT)
Dept: RADIOLOGY | Facility: HOSPITAL | Age: 69
End: 2024-09-10
Payer: COMMERCIAL

## 2024-09-10 ENCOUNTER — HOSPITAL ENCOUNTER (INPATIENT)
Facility: HOSPITAL | Age: 69
LOS: 3 days | Discharge: HOME | End: 2024-09-13
Attending: EMERGENCY MEDICINE | Admitting: STUDENT IN AN ORGANIZED HEALTH CARE EDUCATION/TRAINING PROGRAM
Payer: COMMERCIAL

## 2024-09-10 ENCOUNTER — HOME CARE VISIT (OUTPATIENT)
Dept: HOME HEALTH SERVICES | Facility: HOME HEALTH | Age: 69
End: 2024-09-10
Payer: COMMERCIAL

## 2024-09-10 ENCOUNTER — CLINICAL SUPPORT (OUTPATIENT)
Dept: EMERGENCY MEDICINE | Facility: HOSPITAL | Age: 69
End: 2024-09-10
Payer: COMMERCIAL

## 2024-09-10 DIAGNOSIS — Z99.2 ESRD (END STAGE RENAL DISEASE) ON DIALYSIS (MULTI): ICD-10-CM

## 2024-09-10 DIAGNOSIS — J18.9 PNEUMONIA OF BOTH LUNGS DUE TO INFECTIOUS ORGANISM, UNSPECIFIED PART OF LUNG: Primary | ICD-10-CM

## 2024-09-10 DIAGNOSIS — N18.6 ESRD (END STAGE RENAL DISEASE) ON DIALYSIS (MULTI): ICD-10-CM

## 2024-09-10 DIAGNOSIS — I10 HYPERTENSION, UNSPECIFIED TYPE: ICD-10-CM

## 2024-09-10 LAB
ALBUMIN SERPL BCP-MCNC: 3.5 G/DL (ref 3.4–5)
ALP SERPL-CCNC: 121 U/L (ref 33–136)
ALT SERPL W P-5'-P-CCNC: 20 U/L (ref 10–52)
ANION GAP BLDV CALCULATED.4IONS-SCNC: 6 MMOL/L (ref 10–25)
ANION GAP BLDV CALCULATED.4IONS-SCNC: 6 MMOL/L (ref 10–25)
ANION GAP SERPL CALC-SCNC: 9 MMOL/L (ref 10–20)
APPEARANCE UR: CLEAR
AST SERPL W P-5'-P-CCNC: 16 U/L (ref 9–39)
ATRIAL RATE: 94 BPM
BASE EXCESS BLDV CALC-SCNC: 7.4 MMOL/L (ref -2–3)
BASE EXCESS BLDV CALC-SCNC: 8.6 MMOL/L (ref -2–3)
BASOPHILS # BLD AUTO: 0.03 X10*3/UL (ref 0–0.1)
BASOPHILS NFR BLD AUTO: 0.4 %
BILIRUB DIRECT SERPL-MCNC: 0.2 MG/DL (ref 0–0.3)
BILIRUB SERPL-MCNC: 0.7 MG/DL (ref 0–1.2)
BILIRUB UR STRIP.AUTO-MCNC: NEGATIVE MG/DL
BNP SERPL-MCNC: 1059 PG/ML (ref 0–99)
BODY TEMPERATURE: 37 DEGREES CELSIUS
BODY TEMPERATURE: 37 DEGREES CELSIUS
BUN SERPL-MCNC: 26 MG/DL (ref 6–23)
CA-I BLDV-SCNC: 1.19 MMOL/L (ref 1.1–1.33)
CA-I BLDV-SCNC: 1.29 MMOL/L (ref 1.1–1.33)
CALCIUM SERPL-MCNC: 9.9 MG/DL (ref 8.6–10.6)
CARDIAC TROPONIN I PNL SERPL HS: 52 NG/L (ref 0–53)
CHLORIDE BLDV-SCNC: 100 MMOL/L (ref 98–107)
CHLORIDE BLDV-SCNC: 100 MMOL/L (ref 98–107)
CHLORIDE SERPL-SCNC: 97 MMOL/L (ref 98–107)
CO2 SERPL-SCNC: 33 MMOL/L (ref 21–32)
COLOR UR: ABNORMAL
CREAT SERPL-MCNC: 4.15 MG/DL (ref 0.5–1.3)
EGFRCR SERPLBLD CKD-EPI 2021: 15 ML/MIN/1.73M*2
EOSINOPHIL # BLD AUTO: 0.17 X10*3/UL (ref 0–0.7)
EOSINOPHIL NFR BLD AUTO: 2.5 %
ERYTHROCYTE [DISTWIDTH] IN BLOOD BY AUTOMATED COUNT: 18.2 % (ref 11.5–14.5)
FLUAV RNA RESP QL NAA+PROBE: NOT DETECTED
FLUBV RNA RESP QL NAA+PROBE: NOT DETECTED
GLUCOSE BLDV-MCNC: 200 MG/DL (ref 74–99)
GLUCOSE BLDV-MCNC: 223 MG/DL (ref 74–99)
GLUCOSE SERPL-MCNC: 201 MG/DL (ref 74–99)
GLUCOSE UR STRIP.AUTO-MCNC: ABNORMAL MG/DL
HCO3 BLDV-SCNC: 31.2 MMOL/L (ref 22–26)
HCO3 BLDV-SCNC: 32.8 MMOL/L (ref 22–26)
HCT VFR BLD AUTO: 27 % (ref 41–52)
HCT VFR BLD EST: 29 % (ref 41–52)
HCT VFR BLD EST: 29 % (ref 41–52)
HGB BLD-MCNC: 9.2 G/DL (ref 13.5–17.5)
HGB BLDV-MCNC: 9.6 G/DL (ref 13.5–17.5)
HGB BLDV-MCNC: 9.7 G/DL (ref 13.5–17.5)
HOLD SPECIMEN: NORMAL
IMM GRANULOCYTES # BLD AUTO: 0.07 X10*3/UL (ref 0–0.7)
IMM GRANULOCYTES NFR BLD AUTO: 1 % (ref 0–0.9)
INHALED O2 CONCENTRATION: 21 %
KETONES UR STRIP.AUTO-MCNC: NEGATIVE MG/DL
LACTATE BLDV-SCNC: 1 MMOL/L (ref 0.4–2)
LACTATE BLDV-SCNC: 1.4 MMOL/L (ref 0.4–2)
LEUKOCYTE ESTERASE UR QL STRIP.AUTO: NEGATIVE
LYMPHOCYTES # BLD AUTO: 0.7 X10*3/UL (ref 1.2–4.8)
LYMPHOCYTES NFR BLD AUTO: 10.5 %
MAGNESIUM SERPL-MCNC: 1.66 MG/DL (ref 1.6–2.4)
MCH RBC QN AUTO: 28.7 PG (ref 26–34)
MCHC RBC AUTO-ENTMCNC: 34.1 G/DL (ref 32–36)
MCV RBC AUTO: 84 FL (ref 80–100)
MONOCYTES # BLD AUTO: 0.71 X10*3/UL (ref 0.1–1)
MONOCYTES NFR BLD AUTO: 10.6 %
MRSA DNA SPEC QL NAA+PROBE: NOT DETECTED
NEUTROPHILS # BLD AUTO: 4.99 X10*3/UL (ref 1.2–7.7)
NEUTROPHILS NFR BLD AUTO: 75 %
NITRITE UR QL STRIP.AUTO: NEGATIVE
NRBC BLD-RTO: 0 /100 WBCS (ref 0–0)
OXYHGB MFR BLDV: 77.5 % (ref 45–75)
OXYHGB MFR BLDV: 79.9 % (ref 45–75)
P AXIS: 83 DEGREES
P OFFSET: 175 MS
P ONSET: 130 MS
PCO2 BLDV: 40 MM HG (ref 41–51)
PCO2 BLDV: 43 MM HG (ref 41–51)
PH BLDV: 7.49 PH (ref 7.33–7.43)
PH BLDV: 7.5 PH (ref 7.33–7.43)
PH UR STRIP.AUTO: 8 [PH]
PLATELET # BLD AUTO: 144 X10*3/UL (ref 150–450)
PO2 BLDV: 48 MM HG (ref 35–45)
PO2 BLDV: 49 MM HG (ref 35–45)
POTASSIUM BLDV-SCNC: 4.7 MMOL/L (ref 3.5–5.3)
POTASSIUM BLDV-SCNC: 8.5 MMOL/L (ref 3.5–5.3)
POTASSIUM SERPL-SCNC: 4.3 MMOL/L (ref 3.5–5.3)
POTASSIUM SERPL-SCNC: 4.4 MMOL/L (ref 3.5–5.3)
PR INTERVAL: 174 MS
PROT SERPL-MCNC: 7 G/DL (ref 6.4–8.2)
PROT UR STRIP.AUTO-MCNC: ABNORMAL MG/DL
Q ONSET: 217 MS
QRS COUNT: 15 BEATS
QRS DURATION: 132 MS
QT INTERVAL: 348 MS
QTC CALCULATION(BAZETT): 435 MS
QTC FREDERICIA: 404 MS
R AXIS: -20 DEGREES
RBC # BLD AUTO: 3.21 X10*6/UL (ref 4.5–5.9)
RBC # UR STRIP.AUTO: ABNORMAL /UL
RBC #/AREA URNS AUTO: NORMAL /HPF
SAO2 % BLDV: 80 % (ref 45–75)
SAO2 % BLDV: 83 % (ref 45–75)
SARS-COV-2 RNA RESP QL NAA+PROBE: NOT DETECTED
SODIUM BLDV-SCNC: 130 MMOL/L (ref 136–145)
SODIUM BLDV-SCNC: 132 MMOL/L (ref 136–145)
SODIUM SERPL-SCNC: 135 MMOL/L (ref 136–145)
SP GR UR STRIP.AUTO: 1.01
T AXIS: 51 DEGREES
T OFFSET: 391 MS
UROBILINOGEN UR STRIP.AUTO-MCNC: NORMAL MG/DL
VENTRICULAR RATE: 94 BPM
WBC # BLD AUTO: 6.7 X10*3/UL (ref 4.4–11.3)
WBC #/AREA URNS AUTO: NORMAL /HPF

## 2024-09-10 PROCEDURE — 96365 THER/PROPH/DIAG IV INF INIT: CPT

## 2024-09-10 PROCEDURE — 83735 ASSAY OF MAGNESIUM: CPT | Performed by: EMERGENCY MEDICINE

## 2024-09-10 PROCEDURE — 82330 ASSAY OF CALCIUM: CPT

## 2024-09-10 PROCEDURE — 96375 TX/PRO/DX INJ NEW DRUG ADDON: CPT

## 2024-09-10 PROCEDURE — 71046 X-RAY EXAM CHEST 2 VIEWS: CPT

## 2024-09-10 PROCEDURE — 71046 X-RAY EXAM CHEST 2 VIEWS: CPT | Performed by: STUDENT IN AN ORGANIZED HEALTH CARE EDUCATION/TRAINING PROGRAM

## 2024-09-10 PROCEDURE — 2500000004 HC RX 250 GENERAL PHARMACY W/ HCPCS (ALT 636 FOR OP/ED): Mod: SE | Performed by: EMERGENCY MEDICINE

## 2024-09-10 PROCEDURE — 96376 TX/PRO/DX INJ SAME DRUG ADON: CPT

## 2024-09-10 PROCEDURE — 84132 ASSAY OF SERUM POTASSIUM: CPT | Performed by: EMERGENCY MEDICINE

## 2024-09-10 PROCEDURE — 74176 CT ABD & PELVIS W/O CONTRAST: CPT

## 2024-09-10 PROCEDURE — 87040 BLOOD CULTURE FOR BACTERIA: CPT

## 2024-09-10 PROCEDURE — 84484 ASSAY OF TROPONIN QUANT: CPT

## 2024-09-10 PROCEDURE — 1210000001 HC SEMI-PRIVATE ROOM DAILY

## 2024-09-10 PROCEDURE — 82248 BILIRUBIN DIRECT: CPT | Performed by: EMERGENCY MEDICINE

## 2024-09-10 PROCEDURE — 87040 BLOOD CULTURE FOR BACTERIA: CPT | Performed by: EMERGENCY MEDICINE

## 2024-09-10 PROCEDURE — 83880 ASSAY OF NATRIURETIC PEPTIDE: CPT

## 2024-09-10 PROCEDURE — G0152 HHCP-SERV OF OT,EA 15 MIN: HCPCS

## 2024-09-10 PROCEDURE — 2500000004 HC RX 250 GENERAL PHARMACY W/ HCPCS (ALT 636 FOR OP/ED): Mod: SE

## 2024-09-10 PROCEDURE — 36415 COLL VENOUS BLD VENIPUNCTURE: CPT | Performed by: EMERGENCY MEDICINE

## 2024-09-10 PROCEDURE — 99285 EMERGENCY DEPT VISIT HI MDM: CPT | Performed by: EMERGENCY MEDICINE

## 2024-09-10 PROCEDURE — 87636 SARSCOV2 & INF A&B AMP PRB: CPT | Performed by: EMERGENCY MEDICINE

## 2024-09-10 PROCEDURE — 84145 PROCALCITONIN (PCT): CPT

## 2024-09-10 PROCEDURE — 81001 URINALYSIS AUTO W/SCOPE: CPT | Performed by: EMERGENCY MEDICINE

## 2024-09-10 PROCEDURE — 2500000001 HC RX 250 WO HCPCS SELF ADMINISTERED DRUGS (ALT 637 FOR MEDICARE OP): Mod: SE

## 2024-09-10 PROCEDURE — 74176 CT ABD & PELVIS W/O CONTRAST: CPT | Mod: FOREIGN READ | Performed by: RADIOLOGY

## 2024-09-10 PROCEDURE — 93005 ELECTROCARDIOGRAM TRACING: CPT

## 2024-09-10 PROCEDURE — 2500000002 HC RX 250 W HCPCS SELF ADMINISTERED DRUGS (ALT 637 FOR MEDICARE OP, ALT 636 FOR OP/ED)

## 2024-09-10 PROCEDURE — 87502 INFLUENZA DNA AMP PROBE: CPT | Performed by: EMERGENCY MEDICINE

## 2024-09-10 PROCEDURE — 87075 CULTR BACTERIA EXCEPT BLOOD: CPT | Performed by: EMERGENCY MEDICINE

## 2024-09-10 PROCEDURE — 84132 ASSAY OF SERUM POTASSIUM: CPT

## 2024-09-10 PROCEDURE — 87641 MR-STAPH DNA AMP PROBE: CPT

## 2024-09-10 PROCEDURE — 85025 COMPLETE CBC W/AUTO DIFF WBC: CPT | Performed by: EMERGENCY MEDICINE

## 2024-09-10 PROCEDURE — 2500000001 HC RX 250 WO HCPCS SELF ADMINISTERED DRUGS (ALT 637 FOR MEDICARE OP)

## 2024-09-10 PROCEDURE — 2500000004 HC RX 250 GENERAL PHARMACY W/ HCPCS (ALT 636 FOR OP/ED)

## 2024-09-10 PROCEDURE — 96367 TX/PROPH/DG ADDL SEQ IV INF: CPT

## 2024-09-10 PROCEDURE — 99285 EMERGENCY DEPT VISIT HI MDM: CPT

## 2024-09-10 RX ORDER — ACETAMINOPHEN 325 MG/1
975 TABLET ORAL ONCE
Status: COMPLETED | OUTPATIENT
Start: 2024-09-10 | End: 2024-09-10

## 2024-09-10 RX ORDER — LABETALOL HYDROCHLORIDE 5 MG/ML
20 INJECTION, SOLUTION INTRAVENOUS ONCE
Status: COMPLETED | OUTPATIENT
Start: 2024-09-10 | End: 2024-09-10

## 2024-09-10 RX ORDER — HYDRALAZINE HYDROCHLORIDE 50 MG/1
50 TABLET, FILM COATED ORAL 3 TIMES DAILY
Status: DISCONTINUED | OUTPATIENT
Start: 2024-09-10 | End: 2024-09-13 | Stop reason: HOSPADM

## 2024-09-10 RX ORDER — PRAVASTATIN SODIUM 20 MG/1
10 TABLET ORAL NIGHTLY
Status: DISCONTINUED | OUTPATIENT
Start: 2024-09-10 | End: 2024-09-13 | Stop reason: HOSPADM

## 2024-09-10 RX ORDER — VANCOMYCIN HYDROCHLORIDE 1 G/20ML
INJECTION, POWDER, LYOPHILIZED, FOR SOLUTION INTRAVENOUS DAILY PRN
Status: DISCONTINUED | OUTPATIENT
Start: 2024-09-10 | End: 2024-09-10

## 2024-09-10 RX ORDER — AZITHROMYCIN 500 MG/1
500 TABLET, FILM COATED ORAL ONCE
Status: COMPLETED | OUTPATIENT
Start: 2024-09-10 | End: 2024-09-10

## 2024-09-10 RX ORDER — INSULIN LISPRO 100 [IU]/ML
0-5 INJECTION, SOLUTION INTRAVENOUS; SUBCUTANEOUS
Status: DISCONTINUED | OUTPATIENT
Start: 2024-09-11 | End: 2024-09-13 | Stop reason: HOSPADM

## 2024-09-10 RX ORDER — VANCOMYCIN HYDROCHLORIDE 1 G/200ML
2000 INJECTION, SOLUTION INTRAVENOUS ONCE
Status: COMPLETED | OUTPATIENT
Start: 2024-09-10 | End: 2024-09-10

## 2024-09-10 RX ORDER — ISOSORBIDE MONONITRATE 30 MG/1
60 TABLET, EXTENDED RELEASE ORAL DAILY
Status: DISCONTINUED | OUTPATIENT
Start: 2024-09-11 | End: 2024-09-13 | Stop reason: HOSPADM

## 2024-09-10 RX ORDER — NAPROXEN SODIUM 220 MG/1
81 TABLET, FILM COATED ORAL DAILY
Status: DISCONTINUED | OUTPATIENT
Start: 2024-09-11 | End: 2024-09-13 | Stop reason: HOSPADM

## 2024-09-10 RX ORDER — DEXTROSE 50 % IN WATER (D50W) INTRAVENOUS SYRINGE
12.5
Status: DISCONTINUED | OUTPATIENT
Start: 2024-09-10 | End: 2024-09-13 | Stop reason: HOSPADM

## 2024-09-10 RX ORDER — DEXTROSE 50 % IN WATER (D50W) INTRAVENOUS SYRINGE
25
Status: DISCONTINUED | OUTPATIENT
Start: 2024-09-10 | End: 2024-09-13 | Stop reason: HOSPADM

## 2024-09-10 RX ORDER — HYDRALAZINE HYDROCHLORIDE 25 MG/1
25 TABLET, FILM COATED ORAL 4 TIMES DAILY PRN
Status: DISCONTINUED | OUTPATIENT
Start: 2024-09-10 | End: 2024-09-13 | Stop reason: HOSPADM

## 2024-09-10 RX ORDER — ACETAMINOPHEN 325 MG/1
975 TABLET ORAL EVERY 6 HOURS PRN
Status: DISCONTINUED | OUTPATIENT
Start: 2024-09-10 | End: 2024-09-11

## 2024-09-10 RX ORDER — PANTOPRAZOLE SODIUM 40 MG/1
40 TABLET, DELAYED RELEASE ORAL
Status: DISCONTINUED | OUTPATIENT
Start: 2024-09-11 | End: 2024-09-13 | Stop reason: HOSPADM

## 2024-09-10 RX ORDER — NIFEDIPINE 60 MG/1
60 TABLET, FILM COATED, EXTENDED RELEASE ORAL 2 TIMES DAILY
Status: DISCONTINUED | OUTPATIENT
Start: 2024-09-10 | End: 2024-09-13 | Stop reason: HOSPADM

## 2024-09-10 RX ORDER — ONDANSETRON 4 MG/1
4 TABLET, FILM COATED ORAL EVERY 8 HOURS PRN
Status: DISCONTINUED | OUTPATIENT
Start: 2024-09-10 | End: 2024-09-11

## 2024-09-10 RX ORDER — ONDANSETRON HYDROCHLORIDE 2 MG/ML
4 INJECTION, SOLUTION INTRAVENOUS EVERY 8 HOURS PRN
Status: DISCONTINUED | OUTPATIENT
Start: 2024-09-10 | End: 2024-09-11

## 2024-09-10 RX ORDER — ONDANSETRON HYDROCHLORIDE 2 MG/ML
INJECTION, SOLUTION INTRAVENOUS
Status: COMPLETED
Start: 2024-09-10 | End: 2024-09-10

## 2024-09-10 RX ORDER — HEPARIN SODIUM 5000 [USP'U]/ML
5000 INJECTION, SOLUTION INTRAVENOUS; SUBCUTANEOUS EVERY 8 HOURS
Status: DISCONTINUED | OUTPATIENT
Start: 2024-09-10 | End: 2024-09-13 | Stop reason: HOSPADM

## 2024-09-10 RX ORDER — CALCITRIOL 0.5 UG/1
0.5 CAPSULE ORAL DAILY
Status: DISCONTINUED | OUTPATIENT
Start: 2024-09-11 | End: 2024-09-13 | Stop reason: HOSPADM

## 2024-09-10 RX ORDER — TACROLIMUS 1 MG/G
OINTMENT TOPICAL 2 TIMES DAILY
Status: DISCONTINUED | OUTPATIENT
Start: 2024-09-10 | End: 2024-09-11

## 2024-09-10 RX ORDER — BUMETANIDE 2 MG/1
2 TABLET ORAL
Status: DISCONTINUED | OUTPATIENT
Start: 2024-09-12 | End: 2024-09-11

## 2024-09-10 RX ORDER — HYDROMORPHONE HYDROCHLORIDE 1 MG/ML
0.5 INJECTION, SOLUTION INTRAMUSCULAR; INTRAVENOUS; SUBCUTANEOUS ONCE
Status: COMPLETED | OUTPATIENT
Start: 2024-09-10 | End: 2024-09-10

## 2024-09-10 RX ORDER — HYDRALAZINE HYDROCHLORIDE 25 MG/1
50 TABLET, FILM COATED ORAL ONCE
Status: COMPLETED | OUTPATIENT
Start: 2024-09-10 | End: 2024-09-10

## 2024-09-10 RX ORDER — CINACALCET 30 MG/1
30 TABLET, FILM COATED ORAL DAILY
Status: DISCONTINUED | OUTPATIENT
Start: 2024-09-11 | End: 2024-09-13 | Stop reason: HOSPADM

## 2024-09-10 RX ORDER — MAGNESIUM SULFATE 1 G/100ML
1 INJECTION INTRAVENOUS ONCE
Status: COMPLETED | OUTPATIENT
Start: 2024-09-10 | End: 2024-09-11

## 2024-09-10 RX ORDER — CARVEDILOL 12.5 MG/1
37.5 TABLET ORAL ONCE
Status: COMPLETED | OUTPATIENT
Start: 2024-09-10 | End: 2024-09-10

## 2024-09-10 RX ORDER — PREDNISONE 5 MG/1
5 TABLET ORAL EVERY MORNING
Status: DISCONTINUED | OUTPATIENT
Start: 2024-09-11 | End: 2024-09-13 | Stop reason: HOSPADM

## 2024-09-10 RX ORDER — HYDRALAZINE HYDROCHLORIDE 20 MG/ML
10 INJECTION INTRAMUSCULAR; INTRAVENOUS ONCE
Status: COMPLETED | OUTPATIENT
Start: 2024-09-10 | End: 2024-09-10

## 2024-09-10 RX ORDER — ACETAMINOPHEN 325 MG/1
TABLET ORAL
Status: COMPLETED
Start: 2024-09-10 | End: 2024-09-10

## 2024-09-10 RX ORDER — INSULIN GLARGINE 100 [IU]/ML
10 INJECTION, SOLUTION SUBCUTANEOUS NIGHTLY
Status: DISCONTINUED | OUTPATIENT
Start: 2024-09-10 | End: 2024-09-13

## 2024-09-10 SDOH — HEALTH STABILITY: MENTAL HEALTH: BEHAVIORAL HEALTH(WDL): WITHIN DEFINED LIMITS

## 2024-09-10 SDOH — HEALTH STABILITY: MENTAL HEALTH: SUICIDE ASSESSMENT: ADULT (C-SSRS)

## 2024-09-10 SDOH — HEALTH STABILITY: MENTAL HEALTH: HAVE YOU EVER DONE ANYTHING, STARTED TO DO ANYTHING, OR PREPARED TO DO ANYTHING TO END YOUR LIFE?: NO

## 2024-09-10 SDOH — HEALTH STABILITY: MENTAL HEALTH: HAVE YOU ACTUALLY HAD ANY THOUGHTS OF KILLING YOURSELF?: NO

## 2024-09-10 SDOH — HEALTH STABILITY: MENTAL HEALTH: HAVE YOU WISHED YOU WERE DEAD OR WISHED YOU COULD GO TO SLEEP AND NOT WAKE UP?: NO

## 2024-09-10 ASSESSMENT — PAIN SCALES - GENERAL
PAINLEVEL_OUTOF10: 7
PAINLEVEL_OUTOF10: 0 - NO PAIN
PAINLEVEL_OUTOF10: 10 - WORST POSSIBLE PAIN
PAINLEVEL_OUTOF10: 10 - WORST POSSIBLE PAIN

## 2024-09-10 ASSESSMENT — LIFESTYLE VARIABLES
EVER FELT BAD OR GUILTY ABOUT YOUR DRINKING: NO
HAVE PEOPLE ANNOYED YOU BY CRITICIZING YOUR DRINKING: NO
HAVE YOU EVER FELT YOU SHOULD CUT DOWN ON YOUR DRINKING: NO
TOTAL SCORE: 0
EVER HAD A DRINK FIRST THING IN THE MORNING TO STEADY YOUR NERVES TO GET RID OF A HANGOVER: NO

## 2024-09-10 ASSESSMENT — PAIN DESCRIPTION - PAIN TYPE: TYPE: CHRONIC PAIN

## 2024-09-10 ASSESSMENT — PAIN DESCRIPTION - LOCATION: LOCATION: GENERALIZED

## 2024-09-10 ASSESSMENT — PAIN - FUNCTIONAL ASSESSMENT: PAIN_FUNCTIONAL_ASSESSMENT: 0-10

## 2024-09-10 NOTE — ED TRIAGE NOTES
Pt to ed with c/o fever, cough, malaise for a few days. Pt recently admitted and discharged for pneumonia (discharge 8/31). Pt nurse came to house today and stated his oxygen was low. Fever 103. 102 upon arrival. Tylenol was last given at around 0600. Pt is dialysis pt and gets treatment on t/th/s. And received a full treatment today

## 2024-09-10 NOTE — CONSULTS
Vancomycin Dosing by Pharmacy- INITIAL    Manolo Bashir is a 69 y.o. year old male who Pharmacy has been consulted for vancomycin dosing for pneumonia. Based on the patient's indication and renal status this patient will be dosed based on a goal trough/random level of 15-20.       CrCl < 30 mL/min, will dose by levels.    Visit Vitals  BP (!) 210/89 (BP Location: Right arm)   Pulse 96   Temp (!) 38.9 °C (102 °F)   Resp 20        Lab Results   Component Value Date    CREATININE 4.15 (H) 09/10/2024    CREATININE 7.08 (H) 2024    CREATININE 5.21 (H) 2024    CREATININE 7.90 (H) 2024        Patient weight is as follows:   Vitals:    09/10/24 1314   Weight: 72.6 kg (160 lb)       Cultures:  No results found for the encounter in last 14 days.        No intake/output data recorded.  I/O during current shift:  No intake/output data recorded.    Temp (24hrs), Av.9 °C (102 °F), Min:38.9 °C (102 °F), Max:38.9 °C (102 °F)         Assessment/Plan     Patient will be given a loading dose of 2,000 mg.  Follow-up level will be ordered on  with am labs unless clinically indicated sooner.  Will continue to monitor renal function daily while on vancomycin and order serum creatinine at least every 48 hours if not already ordered.  Follow for continued vancomycin needs, clinical response, and signs/symptoms of toxicity.       Prashant Grey RPh

## 2024-09-10 NOTE — ED PROVIDER NOTES
History of Present Illness     History provided by: Patient  Limitations to History: None  External Records Reviewed with Brief Summary:   - Patient discharged 8/31/24 after being hospitalized for pneumonia. Was discharged on PO vancomycin for c.dif.   HPI:  Manolo Bashir is a 69 y.o. male with ESRD (dialysis T/Th/S, s/p renal transplant, on prednisone + tacrolimus), MGUS, HTN, hepatitis C (s/p treatment), gastroparesis, and c.dif presenting with cough, fever, and flank/back pain. The patient states he started having a cough last night and it has been persistent. He states he had a fever when the home health aide took his temperature today. He endorses bilateral flank and lower back pain, worse on his left than his right. This pain started today after he got home from dialysis. He denies shortness of breath, runny nose, nausea, vomiting, headache, urinary symptoms, diarrhea, or constipation. He states he finished his PO vancomycin for c.dif. last week. In triage patient was hypertensive (210/89) and febrile (38.9). He took his home BP meds (hydralazine, carvedilol, and nifedipine) this morning. The patient states he usually runs in the 140s and he typically gets high blood pressure when he gets this back pain.    Physical Exam   Triage vitals:  T (!) 38.9 °C (102 °F)  HR 96  BP (!) 210/89  RR 20  O2 97 % None (Room air)    Physical Exam  Constitutional:       General: He is not in acute distress.  HENT:      Head: Normocephalic and atraumatic.   Cardiovascular:      Rate and Rhythm: Normal rate and regular rhythm.   Pulmonary:      Effort: Pulmonary effort is normal.      Comments: Rhonchi in left upper and lower lobes.  Abdominal:      General: There is no distension.      Tenderness: There is no abdominal tenderness.   Musculoskeletal:         General: No swelling.   Neurological:      General: No focal deficit present.      Mental Status: He is alert.          Medical Decision Making & ED Course   Medical  Decision Making and ED course:  69 y.o. male with ESRD (dialysis T/Th/S, s/p renal transplant, on prednisone + tacrolimus), MGUS, HTN, hepatitis C (s/p treatment), gastroparesis, and c.dif presenting with cough, fever, and flank/back pain in the setting of recent pneumonia. CXR revealed pneumonia. Given that patient is immunocompromised will obtain blood cultures to evaluate for sepsis. The patient was started on Zosyn, vancomycin, and azithromycin for empiric treatment of hospital and community acquired pneumonia. Flu and Covid were negative. CTAP obtained to evaluate for abdominal pathology causing flank/back pain. CTAP did not reveal acute pathology.  UA negative for infection, decreasing suspicion for UTI. The patient received Dilaudid for pain control. CBC and CMP were unremarkable. VBG revealed hyperkalemia so repeat potassium was obtained and was normal (4.4). EKG revealed normal sinus rhythm. The patient received his home Hydralazine and Carvedilol dose for management of hypertension. The patient endorsed persistent shortness of breath and was saturating ~90% so he was started on oxygen. BP was elevated to 219/99 after home BP meds so patient received IV labetolol 20mg and hydralazine 10mg. BP improved moderately to 197/92.   The patient will need to be admitted to medicine for management of pneumonia. The patient was accepted to medicine under Dr. Ojeda.   ----    Differential diagnoses considered include but are not limited to: pneumonia, sepsis, hypertension, UTI     Social Determinants of Health which Significantly Impact Care: None identified     EKG Independent Interpretation: EKG interpreted by myself. Please see ED Course for full interpretation.    Independent Result Review and Interpretation: Relevant laboratory and radiographic results were reviewed and independently interpreted by myself.  As necessary, they are commented on in the ED Course.    Chronic conditions affecting the patient's care:  As documented above in MDM    The patient was discussed with the following consultants/services: Admission Coordinator who accepted the patient for admission    Care Considerations: As documented above in MDM      Diagnoses as of 09/10/24 2222   Pneumonia of both lungs due to infectious organism, unspecified part of lung   Hypertension, unspecified type         Disposition   Admit to general medicine      Patient seen and discussed with ED attending physician.    Eve Moreno MD PGY-1   Emergency Medicine     Eve Moreno MD  Resident  09/10/24 2226       Eve Moreno MD  Resident  09/10/24 2234

## 2024-09-11 ENCOUNTER — APPOINTMENT (OUTPATIENT)
Dept: DIALYSIS | Facility: HOSPITAL | Age: 69
End: 2024-09-11
Payer: COMMERCIAL

## 2024-09-11 LAB
ALBUMIN SERPL BCP-MCNC: 3.2 G/DL (ref 3.4–5)
ALP SERPL-CCNC: 75 U/L (ref 33–136)
ALT SERPL W P-5'-P-CCNC: 17 U/L (ref 10–52)
ANION GAP SERPL CALC-SCNC: 12 MMOL/L (ref 10–20)
APTT PPP: 32 SECONDS (ref 27–38)
AST SERPL W P-5'-P-CCNC: 14 U/L (ref 9–39)
BASOPHILS # BLD AUTO: 0.03 X10*3/UL (ref 0–0.1)
BASOPHILS NFR BLD AUTO: 0.5 %
BILIRUB DIRECT SERPL-MCNC: 0.2 MG/DL (ref 0–0.3)
BILIRUB SERPL-MCNC: 0.7 MG/DL (ref 0–1.2)
BUN SERPL-MCNC: 29 MG/DL (ref 6–23)
CALCIUM SERPL-MCNC: 9.6 MG/DL (ref 8.6–10.6)
CHLORIDE SERPL-SCNC: 97 MMOL/L (ref 98–107)
CO2 SERPL-SCNC: 28 MMOL/L (ref 21–32)
CREAT SERPL-MCNC: 4.8 MG/DL (ref 0.5–1.3)
EGFRCR SERPLBLD CKD-EPI 2021: 12 ML/MIN/1.73M*2
EOSINOPHIL # BLD AUTO: 0.2 X10*3/UL (ref 0–0.7)
EOSINOPHIL NFR BLD AUTO: 3.2 %
ERYTHROCYTE [DISTWIDTH] IN BLOOD BY AUTOMATED COUNT: 18.9 % (ref 11.5–14.5)
GLUCOSE BLD MANUAL STRIP-MCNC: 121 MG/DL (ref 74–99)
GLUCOSE BLD MANUAL STRIP-MCNC: 138 MG/DL (ref 74–99)
GLUCOSE BLD MANUAL STRIP-MCNC: 296 MG/DL (ref 74–99)
GLUCOSE SERPL-MCNC: 170 MG/DL (ref 74–99)
HBV SURFACE AB SER-ACNC: 4.7 MIU/ML
HBV SURFACE AG SERPL QL IA: NONREACTIVE
HCT VFR BLD AUTO: 28 % (ref 41–52)
HGB BLD-MCNC: 9.2 G/DL (ref 13.5–17.5)
HOLD SPECIMEN: NORMAL
IMM GRANULOCYTES # BLD AUTO: 0.05 X10*3/UL (ref 0–0.7)
IMM GRANULOCYTES NFR BLD AUTO: 0.8 % (ref 0–0.9)
INR PPP: 1.3 (ref 0.9–1.1)
LYMPHOCYTES # BLD AUTO: 0.8 X10*3/UL (ref 1.2–4.8)
LYMPHOCYTES NFR BLD AUTO: 12.9 %
MAGNESIUM SERPL-MCNC: 1.91 MG/DL (ref 1.6–2.4)
MCH RBC QN AUTO: 28.6 PG (ref 26–34)
MCHC RBC AUTO-ENTMCNC: 32.9 G/DL (ref 32–36)
MCV RBC AUTO: 87 FL (ref 80–100)
MONOCYTES # BLD AUTO: 0.69 X10*3/UL (ref 0.1–1)
MONOCYTES NFR BLD AUTO: 11.1 %
NEUTROPHILS # BLD AUTO: 4.45 X10*3/UL (ref 1.2–7.7)
NEUTROPHILS NFR BLD AUTO: 71.5 %
NRBC BLD-RTO: 0 /100 WBCS (ref 0–0)
PHOSPHATE SERPL-MCNC: 3.2 MG/DL (ref 2.5–4.9)
PLATELET # BLD AUTO: 103 X10*3/UL (ref 150–450)
POTASSIUM SERPL-SCNC: 4.8 MMOL/L (ref 3.5–5.3)
PROCALCITONIN SERPL-MCNC: 0.3 NG/ML
PROT SERPL-MCNC: 6.3 G/DL (ref 6.4–8.2)
PROTHROMBIN TIME: 15 SECONDS (ref 9.8–12.8)
RBC # BLD AUTO: 3.22 X10*6/UL (ref 4.5–5.9)
SODIUM SERPL-SCNC: 132 MMOL/L (ref 136–145)
VANCOMYCIN SERPL-MCNC: 27.8 UG/ML (ref 5–20)
WBC # BLD AUTO: 6.2 X10*3/UL (ref 4.4–11.3)

## 2024-09-11 PROCEDURE — 82947 ASSAY GLUCOSE BLOOD QUANT: CPT

## 2024-09-11 PROCEDURE — 36415 COLL VENOUS BLD VENIPUNCTURE: CPT | Performed by: INTERNAL MEDICINE

## 2024-09-11 PROCEDURE — 99233 SBSQ HOSP IP/OBS HIGH 50: CPT

## 2024-09-11 PROCEDURE — 8010000001 HC DIALYSIS - HEMODIALYSIS PER DAY

## 2024-09-11 PROCEDURE — 80202 ASSAY OF VANCOMYCIN: CPT

## 2024-09-11 PROCEDURE — 2500000005 HC RX 250 GENERAL PHARMACY W/O HCPCS

## 2024-09-11 PROCEDURE — 2500000001 HC RX 250 WO HCPCS SELF ADMINISTERED DRUGS (ALT 637 FOR MEDICARE OP)

## 2024-09-11 PROCEDURE — 86706 HEP B SURFACE ANTIBODY: CPT | Performed by: INTERNAL MEDICINE

## 2024-09-11 PROCEDURE — 84100 ASSAY OF PHOSPHORUS: CPT

## 2024-09-11 PROCEDURE — 2500000002 HC RX 250 W HCPCS SELF ADMINISTERED DRUGS (ALT 637 FOR MEDICARE OP, ALT 636 FOR OP/ED)

## 2024-09-11 PROCEDURE — 85025 COMPLETE CBC W/AUTO DIFF WBC: CPT

## 2024-09-11 PROCEDURE — 1200000002 HC GENERAL ROOM WITH TELEMETRY DAILY

## 2024-09-11 PROCEDURE — 83735 ASSAY OF MAGNESIUM: CPT

## 2024-09-11 PROCEDURE — 2500000004 HC RX 250 GENERAL PHARMACY W/ HCPCS (ALT 636 FOR OP/ED)

## 2024-09-11 PROCEDURE — 5A1D70Z PERFORMANCE OF URINARY FILTRATION, INTERMITTENT, LESS THAN 6 HOURS PER DAY: ICD-10-PCS | Performed by: STUDENT IN AN ORGANIZED HEALTH CARE EDUCATION/TRAINING PROGRAM

## 2024-09-11 PROCEDURE — 87632 RESP VIRUS 6-11 TARGETS: CPT

## 2024-09-11 PROCEDURE — 36415 COLL VENOUS BLD VENIPUNCTURE: CPT

## 2024-09-11 PROCEDURE — 80053 COMPREHEN METABOLIC PANEL: CPT

## 2024-09-11 PROCEDURE — 87340 HEPATITIS B SURFACE AG IA: CPT | Performed by: INTERNAL MEDICINE

## 2024-09-11 PROCEDURE — 85610 PROTHROMBIN TIME: CPT

## 2024-09-11 RX ORDER — ACETAMINOPHEN 325 MG/1
975 TABLET ORAL EVERY 8 HOURS
Status: DISCONTINUED | OUTPATIENT
Start: 2024-09-11 | End: 2024-09-13 | Stop reason: HOSPADM

## 2024-09-11 RX ORDER — HYDROMORPHONE HYDROCHLORIDE 2 MG/1
1 TABLET ORAL ONCE
Status: COMPLETED | OUTPATIENT
Start: 2024-09-11 | End: 2024-09-11

## 2024-09-11 RX ORDER — LIDOCAINE 560 MG/1
1 PATCH PERCUTANEOUS; TOPICAL; TRANSDERMAL DAILY
Status: DISCONTINUED | OUTPATIENT
Start: 2024-09-11 | End: 2024-09-13 | Stop reason: HOSPADM

## 2024-09-11 RX ORDER — BUMETANIDE 2 MG/1
2 TABLET ORAL
Status: DISCONTINUED | OUTPATIENT
Start: 2024-09-11 | End: 2024-09-13 | Stop reason: HOSPADM

## 2024-09-11 RX ORDER — PARICALCITOL 5 UG/ML
2 INJECTION, SOLUTION INTRAVENOUS 3 TIMES WEEKLY
Status: DISCONTINUED | OUTPATIENT
Start: 2024-09-12 | End: 2024-09-13 | Stop reason: HOSPADM

## 2024-09-11 RX ORDER — POLYETHYLENE GLYCOL 3350 17 G/17G
17 POWDER, FOR SOLUTION ORAL DAILY
Status: DISCONTINUED | OUTPATIENT
Start: 2024-09-11 | End: 2024-09-13 | Stop reason: HOSPADM

## 2024-09-11 RX ORDER — HYDROMORPHONE HYDROCHLORIDE 2 MG/1
1 TABLET ORAL EVERY 4 HOURS PRN
Status: DISCONTINUED | OUTPATIENT
Start: 2024-09-11 | End: 2024-09-13 | Stop reason: HOSPADM

## 2024-09-11 RX ORDER — LIDOCAINE 560 MG/1
1 PATCH PERCUTANEOUS; TOPICAL; TRANSDERMAL DAILY
Status: DISCONTINUED | OUTPATIENT
Start: 2024-09-11 | End: 2024-09-11

## 2024-09-11 RX ORDER — TACROLIMUS 0.5 MG/1
0.5 CAPSULE ORAL
Status: DISCONTINUED | OUTPATIENT
Start: 2024-09-11 | End: 2024-09-13 | Stop reason: HOSPADM

## 2024-09-11 RX ORDER — METOCLOPRAMIDE HYDROCHLORIDE 5 MG/ML
5 INJECTION INTRAMUSCULAR; INTRAVENOUS 2 TIMES DAILY
Status: DISCONTINUED | OUTPATIENT
Start: 2024-09-11 | End: 2024-09-13 | Stop reason: HOSPADM

## 2024-09-11 RX ORDER — METOCLOPRAMIDE HYDROCHLORIDE 5 MG/ML
5 INJECTION INTRAMUSCULAR; INTRAVENOUS EVERY 6 HOURS PRN
Status: DISCONTINUED | OUTPATIENT
Start: 2024-09-11 | End: 2024-09-11

## 2024-09-11 RX ORDER — METOCLOPRAMIDE 10 MG/1
5 TABLET ORAL EVERY 6 HOURS PRN
Status: DISCONTINUED | OUTPATIENT
Start: 2024-09-11 | End: 2024-09-11

## 2024-09-11 RX ORDER — HYDROMORPHONE HYDROCHLORIDE 1 MG/ML
0.2 INJECTION, SOLUTION INTRAMUSCULAR; INTRAVENOUS; SUBCUTANEOUS ONCE
Status: DISCONTINUED | OUTPATIENT
Start: 2024-09-11 | End: 2024-09-11

## 2024-09-11 RX ORDER — METOCLOPRAMIDE 5 MG/1
5 TABLET ORAL 2 TIMES DAILY
Status: DISCONTINUED | OUTPATIENT
Start: 2024-09-11 | End: 2024-09-13 | Stop reason: HOSPADM

## 2024-09-11 ASSESSMENT — PAIN - FUNCTIONAL ASSESSMENT
PAIN_FUNCTIONAL_ASSESSMENT: 0-10
PAIN_FUNCTIONAL_ASSESSMENT: NO/DENIES PAIN

## 2024-09-11 ASSESSMENT — PAIN DESCRIPTION - DESCRIPTORS
DESCRIPTORS: ACHING
DESCRIPTORS: ACHING

## 2024-09-11 ASSESSMENT — ENCOUNTER SYMPTOMS
PAIN: 1
PERSON REPORTING PAIN: PATIENT

## 2024-09-11 NOTE — PROGRESS NOTES
Vancomycin Dosing by Pharmacy- Cessation of Therapy    Consult to pharmacy for vancomycin dosing has been discontinued by the prescriber.    Enrique Flores, PharmD

## 2024-09-11 NOTE — CONSULTS
Reason For Consult  ESRD    History Of Present Illness  Manolo Bashir is a 69 y.o. male presenting with ESRD.  In with PNA of both lungs. TTS [facility]. Last HD 9/10 with post weight 73.2; DW 67; SPENSER AVG,    Labs: K 4.8; Hgb 9.2; /70; Mircera 150 mcg Q 2 weeks last dose given on 9/5      Past Medical History  He has a past medical history of Anemia, Arthritis, Cataract, Chronic kidney disease, stage 3 unspecified (Multi) (09/26/2018), CKD (chronic kidney disease), COVID-19 (06/18/2020), Diabetes (Multi), ESRD (end stage renal disease) (Multi), Focal and segmental proliferative glomerulonephritis (12/23/2023), HTN (hypertension), Hyperlipidemia, Other long term (current) drug therapy (07/20/2021), Personal history of other diseases of the circulatory system, Personal history of other infectious and parasitic diseases (08/17/2015), Polyp, colonic (08/17/2023), Primary osteoarthritis of both ankles (08/17/2023), Prostate cancer (Multi), Tubular adenoma of colon (08/17/2023), and Unspecified kidney failure (08/17/2016).    Surgical History  He has a past surgical history that includes Prostatectomy (10/11/2013); Ileostomy (04/25/2017); Other surgical history (04/21/2017); Ileostomy closure (08/17/2015); Other surgical history (08/17/2015); Other surgical history (08/17/2015); US guided percutaneous peritoneal or retroperitoneal fluid collection drainage (10/20/2022); transplant, kidney, open (1992); transplant, kidney, open (2013); and US guided percutaneous biopsy renal left (Left, 11/20/2023).     Social History  He reports that he has never smoked. He has been exposed to tobacco smoke. He has never used smokeless tobacco. He reports current alcohol use. He reports current drug use. Drug: Oxycodone.    Family History  Family History   Problem Relation Name Age of Onset    Bone cancer Mother      Other (corona's sarcome of the bone marrow) Mother      Prostate cancer Father      Diabetes Other Family  "Hist     Hypertension Other Family Hist         Allergies  Patient has no known allergies.           I&O 24HR    Intake/Output Summary (Last 24 hours) at 9/11/2024 1103  Last data filed at 9/10/2024 1848  Gross per 24 hour   Intake 50 ml   Output --   Net 50 ml       Vitals 24HR  Heart Rate:  [71-96]   Temperature:  [37.8 °C (100 °F)-38.9 °C (102 °F)]   Respirations:  [16-20]   BP: (145-219)/()   Height:  [172.7 cm (5' 8\")]   Weight:  [72.6 kg (160 lb)]   Pulse Ox:  [88 %-98 %]   "

## 2024-09-11 NOTE — PROGRESS NOTES
"Manolo Bashir is a 69 y.o. male on day 1 of admission presenting with Pneumonia of both lungs due to infectious organism, unspecified part of lung.    Subjective   Patient states they are not having any chest pain, headache, nausea/vomiting, diarrhea currently. Reports back pain.       Objective     Physical Exam  Constitutional:       General: He is not in acute distress.  HENT:      Head: Normocephalic and atraumatic.      Mouth/Throat:      Mouth: Mucous membranes are moist.      Pharynx: Oropharynx is clear. No oropharyngeal exudate or posterior oropharyngeal erythema.   Cardiovascular:      Rate and Rhythm: Normal rate and regular rhythm.      Pulses: Normal pulses.      Heart sounds: Normal heart sounds. No murmur heard.     No gallop.   Pulmonary:      Effort: Pulmonary effort is normal. No respiratory distress.      Comments: Rales in the right upper lobe and bilateral lower lobes.  Abdominal:      General: Abdomen is flat. Bowel sounds are normal. There is no distension.      Palpations: Abdomen is soft.      Tenderness: There is no abdominal tenderness.   Musculoskeletal:         General: No swelling or tenderness.      Right lower leg: No edema.      Left lower leg: No edema.   Skin:     General: Skin is warm and dry.   Neurological:      General: No focal deficit present.      Mental Status: He is alert and oriented to person, place, and time.         Last Recorded Vitals  Blood pressure 112/60, pulse 69, temperature 36.5 °C (97.7 °F), temperature source Temporal, resp. rate 18, height 1.727 m (5' 8\"), weight 72.6 kg (160 lb), SpO2 100%.  Intake/Output last 3 Shifts:  I/O last 3 completed shifts:  In: 870 (12 mL/kg) [I.V.:820 (11.3 mL/kg); IV Piggyback:50]  Out: - (0 mL/kg)   Weight: 72.6 kg     Relevant Results  Results for orders placed or performed during the hospital encounter of 09/10/24 (from the past 24 hour(s))   Procalcitonin   Result Value Ref Range    Procalcitonin 0.30 (H) <=0.07 ng/mL "   Vancomycin   Result Value Ref Range    Vancomycin 27.8 (H) 5.0 - 20.0 ug/mL   CBC and Auto Differential   Result Value Ref Range    WBC 6.2 4.4 - 11.3 x10*3/uL    nRBC 0.0 0.0 - 0.0 /100 WBCs    RBC 3.22 (L) 4.50 - 5.90 x10*6/uL    Hemoglobin 9.2 (L) 13.5 - 17.5 g/dL    Hematocrit 28.0 (L) 41.0 - 52.0 %    MCV 87 80 - 100 fL    MCH 28.6 26.0 - 34.0 pg    MCHC 32.9 32.0 - 36.0 g/dL    RDW 18.9 (H) 11.5 - 14.5 %    Platelets 103 (L) 150 - 450 x10*3/uL    Neutrophils % 71.5 40.0 - 80.0 %    Immature Granulocytes %, Automated 0.8 0.0 - 0.9 %    Lymphocytes % 12.9 13.0 - 44.0 %    Monocytes % 11.1 2.0 - 10.0 %    Eosinophils % 3.2 0.0 - 6.0 %    Basophils % 0.5 0.0 - 2.0 %    Neutrophils Absolute 4.45 1.20 - 7.70 x10*3/uL    Immature Granulocytes Absolute, Automated 0.05 0.00 - 0.70 x10*3/uL    Lymphocytes Absolute 0.80 (L) 1.20 - 4.80 x10*3/uL    Monocytes Absolute 0.69 0.10 - 1.00 x10*3/uL    Eosinophils Absolute 0.20 0.00 - 0.70 x10*3/uL    Basophils Absolute 0.03 0.00 - 0.10 x10*3/uL   Magnesium   Result Value Ref Range    Magnesium 1.91 1.60 - 2.40 mg/dL   Hepatic Function Panel   Result Value Ref Range    Albumin 3.2 (L) 3.4 - 5.0 g/dL    Bilirubin, Total 0.7 0.0 - 1.2 mg/dL    Bilirubin, Direct 0.2 0.0 - 0.3 mg/dL    Alkaline Phosphatase 75 33 - 136 U/L    ALT 17 10 - 52 U/L    AST 14 9 - 39 U/L    Total Protein 6.3 (L) 6.4 - 8.2 g/dL   Coagulation Screen   Result Value Ref Range    Protime 15.0 (H) 9.8 - 12.8 seconds    INR 1.3 (H) 0.9 - 1.1    aPTT 32 27 - 38 seconds   Phosphorus   Result Value Ref Range    Phosphorus 3.2 2.5 - 4.9 mg/dL   Basic Metabolic Panel   Result Value Ref Range    Glucose 170 (H) 74 - 99 mg/dL    Sodium 132 (L) 136 - 145 mmol/L    Potassium 4.8 3.5 - 5.3 mmol/L    Chloride 97 (L) 98 - 107 mmol/L    Bicarbonate 28 21 - 32 mmol/L    Anion Gap 12 10 - 20 mmol/L    Urea Nitrogen 29 (H) 6 - 23 mg/dL    Creatinine 4.80 (H) 0.50 - 1.30 mg/dL    eGFR 12 (L) >60 mL/min/1.73m*2    Calcium 9.6  8.6 - 10.6 mg/dL   POCT GLUCOSE   Result Value Ref Range    POCT Glucose 121 (H) 74 - 99 mg/dL   POCT GLUCOSE   Result Value Ref Range    POCT Glucose 138 (H) 74 - 99 mg/dL   Hepatitis B surface antibody   Result Value Ref Range    Hepatitis B Surface AB 4.7 <10.0 mIU/mL   Hepatitis B surface antigen   Result Value Ref Range    Hepatitis B Surface AG Nonreactive Nonreactive     *Note: Due to a large number of results and/or encounters for the requested time period, some results have not been displayed. A complete set of results can be found in Results Review.     Imaging:  ECG 12 lead  Result Date: 9/10/2024  Poor data quality, interpretation may be adversely affected Normal sinus rhythm Nonspecific intraventricular block Minimal voltage criteria for LVH, may be normal variant ( [place] product ) Abnormal ECG When compared with ECG of 27-AUG-2024 16:45, Nonspecific intraventricular block has replaced Incomplete left bundle branch block See ED provider note for full interpretation and clinical correlation Confirmed by Millicent Sheikh ([id number]) on 9/10/2024 11:09:03 PM    CT abdomen pelvis wo IV contrast  Result Date: 9/10/2024  FINDINGS: Please note that the evaluation of vessels, lymph nodes and organs is limited without intravenous contrast.  LOWER CHEST: There is cardiomegaly. No pericardial effusion.  Bilateral pleural effusions, right larger than left, with adjacent atelectasis.  ABDOMEN:  LIVER: No hepatomegaly.  Smooth surface contour.  Normal attenuation.  BILE DUCTS: No intrahepatic or extrahepatic biliary ductal dilatation.  GALLBLADDER: The gallbladder is absent. STOMACH: No abnormalities identified.  PANCREAS: No masses or ductal dilatation.  SPLEEN: No splenomegaly or focal splenic lesion.  ADRENAL GLANDS: No thickening or nodules.  KIDNEYS AND URETERS: Transplant kidney left lower quadrant without hydronephrosis. Severely atrophic bilateral kidneys. No hydronephrosis. PELVIS:  BLADDER: Slightly thick-walled  urinary bladder which is not overly distended. The prostate gland appears absent.  REPRODUCTIVE ORGANS: No abnormalities identified.  BOWEL: Previous small bowel surgeries are seen. Status post right colectomy. Moderate rectal stool suggesting constipation. Rectum measures up to 5 cm in diameter.  VESSELS: No abnormalities identified.  Abdominal aorta is normal in caliber. Diffuse splenic artery atherosclerosis.  PERITONEUM/RETROPERITONEUM/LYMPH NODES: No free fluid.  No pneumoperitoneum. No lymphadenopathy.  ABDOMINAL WALL: No abnormalities identified. SOFT TISSUES: No abnormalities identified.  BONES: No acute fracture or aggressive osseous lesion. Mild degenerative changes are seen.  1. No acute findings in the abdomen or pelvis. No free air or bowel obstruction. 2. Moderate rectal stool. Correlate for constipation. 3. Pleural effusions with basilar atelectasis. 4. Nonspecific mild bladder wall thickening which is unchanged. Correlate for cystitis. Signed by Gus Wheeler MD    XR chest 2 views  Result Date: 9/10/2024  FINDINGS: PA and lateral views of the chest were obtained.   There is redemonstration of a left-sided axillary vascular graft in similar position at the comparison exam.   CARDIOMEDIASTINAL SILHOUETTE: Cardiomediastinal silhouette is enlarged, but stable in size and configuration.   LUNGS: There is slightly decreased pulmonary interstitial edema compared to the study dated 08/27/2024. There has been interval improvement of the right basilar opacity and associated air bronchogram which was first  appreciated on 08/24/2024.   ABDOMEN: Visualized portions of the abdomen are unremarkable.   BONES: The visualized osseous structures are unremarkable.     1. Slightly improved pulmonary interstitial edema. 2. Interval improvement, of right basilar opacity and associated air bronchograms. Finding is consistent with pneumonia in the proper clinical setting.      US kidney transplant  Result Date:  8/29/2024  FINDINGS: TRANSPLANT KIDNEY: Transplant kidney location:  Left iliac fossa. The transplant kidney [id number] cm in craniocaudal dimension. There is mild hydronephrosis. No perinephric fluid collections are seen. Diffusely echogenic appearance of the renal parenchyma.   DOPPLER EVALUATION:   RESISTIVE INDICES: The resistive indices were as follows: 0.72/0.75 in the superior pole, 0.74/0.79 in the midpole, and 0.79/0.73 in the inferior pole. Wave forms are normal.   TRANSPLANT RENAL ARTERY AND VEIN: The main renal artery velocity is 124.8 cm/s.  The arterial anastomosis velocity is 98 cm/s. The adjacent iliac artery velocity is 119 cm/s Transplant renal vein is patent. The peak velocity in the main renal vein is 15.3 cm/s,17.8 cm/s, in the adjacent iliac vein 18 cm/s.  The venous anastomosis velocity is 13 cm/s.     1. Left iliac fossa transplant kidney with mild hydronephrosis and diffusely echogenic parenchyma. Unremarkable vascular evaluation of the transplant kidney. Findings are nonspecific but can be seen with chronic rejection.      Electrocardiogram, 12-lead PRN ACS symptoms  Result Date: 8/28/2024  Normal sinus rhythm Right axis deviation Incomplete left bundle branch block Left ventricular hypertrophy with repolarization abnormality Prolonged QT Abnormal ECG When compared with ECG of 26-AUG-2024 13:22, Incomplete left bundle branch block has replaced Nonspecific intraventricular block Criteria for Lateral infarct are no longer Present Confirmed by Steve Lewis ([id number]) on 8/28/2024 8:40:19 PM    XR chest 1 view  Result Date: 8/28/2024  Interpreted By:  Emile Shah, STUDY: XR CHEST 1 VIEW; 8/27/2024 6:29 am   INDICATION: Signs/Symptoms:pulmonary edema.   COMPARISON: 08/26/2024.   ACCESSION NUMBER(S): [id number]   ORDERING CLINICIAN: JEFFREY CORTEZ   FINDINGS: Left axillary stent placement.   CARDIOMEDIASTINAL SILHOUETTE: Cardiomegaly versus pericardial effusion.   LUNGS: No significant  change in perihilar edema.   ABDOMEN: No remarkable upper abdominal findings.   BONES: No acute osseous changes.     1.  There is no significant change in perihilar edema and correlate with cardiac and fluid status.      Electrocardiogram, 12-lead PRN ACS symptoms  Result Date: 8/27/2024  Sinus rhythm with frequent Premature ventricular complexes Nonspecific intraventricular block Cannot rule out Anterior infarct , age undetermined Abnormal ECG When compared with ECG of 24-AUG-2024 12:11, Premature ventricular complexes are now Present QRS axis Shifted left Nonspecific T wave abnormality no longer evident in Lateral leads Confirmed by Muna Murillo ([id number]) on 8/27/2024 5:56:33 PM    Electrocardiogram, 12-lead PRN ACS symptoms  Result Date: 8/27/2024  Suspect arm lead reversal, interpretation assumes no reversal Normal sinus rhythm Nonspecific intraventricular block Abnormal ECG When compared with ECG of 8/25/24 Premature ventricular complexes are no longer present Confirmed by Steve Lewis ([id number]) on 8/27/2024 5:01:11 PM    US thoracentesis  Result Date: 8/26/2024  Interpreted By:  Suly Hodgson, STUDY: US THORACENTESIS;  8/26/2024 10:15 am   INDICATION: Signs/Symptoms:thoracentessis.   COMPARISON: None.   ACCESSION NUMBER(S): [id number]   ORDERING CLINICIAN: JEFFREY CORTEZ   TECHNIQUE: INTERVENTIONALIST(S): Suly Hodgson PA-C   CONSENT: The patient/patient's POA/next of kin was informed of the nature of the proposed procedure. The purposes, alternatives, risks, and benefits were explained and discussed. All questions were answered and consent was obtained.   SEDATION: None   MEDICATION/CONTRAST: No additional   TIME OUT: A time out was performed immediately prior to procedure start with the interventional team, correctly identifying the patient name, date of birth, MRN, procedure, anatomy (including marking of site and side), patient position, procedure consent form, relevant laboratory and imaging test  results, antibiotic administration, safety precautions, and procedure-specific equipment needs.   FINDINGS: The patient was placed in the sitting position.   The pleural space was examined with grey scale ultrasound, and the most accessible fluid identified and marked for thoracentesis.   The skin was prepped and draped in usual manner. Local anesthesia with Lidocaine was administered and a  right-sided thoracentesis was performed.  A 5 [place] One-Step thoracentesis needle/catheter was then placed where marked.  Approximately 850 mL of serosanguinous colored fluid was removed.  The needle/catheter was then withdrawn.   The patient tolerated the procedure well and there were no immediate complications. Specimen(s) sent to the laboratory and pathology for further evaluation, per the requesting team.       Uneventful  right-sided thoracentesis, as detailed above.   I personally performed and/or directly supervised this study and was present for the entire procedure.   I personally reviewed the study and resident interpretation. I agree with the findings as stated.   Performed and dictated at [facility].   MACRO: None   Signed by: Suly Hodgson 8/26/2024 5:15 PM Dictation workstation:   [id number]    XR chest 1 view  Result Date: 8/26/2024  FINDINGS:     Left subclavian stent, ? Appears kinked   CARDIOMEDIASTINAL SILHOUETTE: Cardiomediastinal silhouette is enlarged and stable in size and configuration.   LUNGS: Mild diffuse increased interstitial markings. Costophrenic angles are clear. No evidence of a pneumothorax   ABDOMEN: No remarkable upper abdominal findings.   BONES: No acute osseous changes.     1.  Mild interstitial edema. No evidence of a pneumothorax or pleural effusion.   2. Left subclavian stent, ? Appears kinked   MACRO: None      ECG 12 lead  Result Date: 8/24/2024  Normal sinus rhythm Nonspecific intraventricular block Minimal voltage criteria for LVH, may be  normal variant ( [place] product ) Abnormal ECG When compared with ECG of 26-JUN-2024 08:26, No significant change was found See ED provider note for full interpretation and clinical correlation Confirmed by Faustina Workman ([id number]) on 8/24/2024 9:19:13 PM    CT abdomen pelvis wo IV contrast  Result Date: 8/24/2024  FINDINGS: Please note that the evaluation of vessels, lymph nodes and organs is limited without intravenous contrast.   LOWER CHEST: Moderate right pleural effusion. Small left pleural effusion. Mild adjacent lung atelectasis is noted. A few ground-glass opacities noted in the bilateral lower lobes which can be seen in the setting of pulmonary edema.. The heart is enlarged.  No evidence of pericardial effusion.   ABDOMEN:   LIVER: The liver is normal in size without evidence of focal lesions.   BILE DUCTS: The intrahepatic and extrahepatic ducts are not dilated.   GALLBLADDER: The gallbladder is not definitively visualized and may be decompressed versus surgically absent.   PANCREAS: The pancreas appears unremarkable without evidence of ductal dilatation or masses.   SPLEEN: The spleen is normal in size with no evidence of focal lesions.   ADRENAL GLANDS: Bilateral adrenal glands appear normal.   KIDNEYS AND URETERS: The bilateral kidneys are atrophic. Left iliac fossa renal transplant without associated perinephric fluid collection or hydroureteronephrosis..   PELVIS:   BLADDER: The urinary bladder is decompressed, limited for evaluation. There is mild bladder wall thickening, similar to prior, and suspected to be secondary to bladder decompression.   REPRODUCTIVE ORGANS: The prostate is not enlarged.   BOWEL: The stomach is unremarkable. The small bowel is normal in caliber without evidence of wall thickening throughout. Status post partial colectomy. The large bowel is otherwise normal in caliber and demonstrates no abnormal wall thickening. The appendix is not definitely visualized. There is however no  pericecal stranding or fluid.   VESSELS: Diffuse vascular calcifications throughout the abdomen. The aorta and IVC otherwise unremarkable.   PERITONEUM/RETROPERITONEUM/LYMPH NODES: There is no free or loculated fluid collection, no free intraperitoneal air. The retroperitoneum appears normal.  No abdominopelvic lymphadenopathy is present.   ABDOMINAL WALL: The abdominal wall soft tissues appear normal.   BONES: No suspicious osseous lesions are identified. Degenerative discogenic disease is noted in the lower thoracic and lumbar spine. Mild compression deformity of the T10 vertebral body is likely degenerative.     1.  Bilateral pleural effusions right-greater-than-left with the ground-glass opacities in the bilateral lower lobes. Findings can be seen with pulmonary edema.. 2. No acute pathology within the abdomen or pelvis to explain right-sided abdominal pain.     XR chest 1 view  Result Date: 8/24/2024  FINDINGS: Sizable area of right basilar consolidation consistent with pneumonia.   There is increased generalized prominence of the perihilar and basilar interstitium and a superimposed component of edema suggested     Sizable area of right basilar consolidation consistent with pneumonia.   There is increased generalized prominence of the perihilar and basilar interstitium and a superimposed component of edema suggested                                   Assessment/Plan   Assessment & Plan  Pneumonia of both lungs due to infectious organism, unspecified part of lung    Hypertension, unspecified type    Manolo Bashir is a 69 y.o. male with PMHx ESRD on iHD (TTS) s/p 2 time renal transplant (1992, 2013), now with impaired allograft function currently on immunosuppression (prednisone, tacrolimus), history of IgG and IgA lambda MGUS (recent hematologic eval including Bmbx with no e/o myeloma), DM2, HTN, prostate cancer s/p radical prostatectomy, baseline urinary incontinence, HCV s/p rx (PCR neg 10/2023) presenting to  the ED with fever, dry cough, nausea,  vomiting, and HTN with SBP to 200. He is admitted for treatment of potential PNA, and HTN control.    #Hypertensive urgency  :: BNP >1k on presentation  :: CT with pleural effusions  Plan:  - c/w home Hydralazine 50 mg TID, carvedilol 37.5 mg, BID  - prn hydralazine 25mg po QID for SBP >180  - c/w home Nifedipine 60 mg daily  - c/w home imdur 60mg daily  - c/w home Bumex 2 mg po on non-HD days (sun, [facility])  - consider IV diuresis inpatient     #HLD  - c/w home asa and pravastatin     #c/f HAP  #Positive SIRs   #Bilateral pleural effusions   :: CXR which demonstrated right basilar consolidation, improved from previous  :: CT AP wo contrast only demonstrated bilateral plural effusion and moderate stool burden  :: No leukocytosis  :: Started on vanc, zosyn and azithromycin in the ED   :: MRSA, covid, flu negative  :: UA bland  :: procal downtrended to 0.3 from prior admission (0.86)  :: Prior thoracentesis on last admission 8/26 with right sided transudative effusion   Plan:  - c/w zosyn and azithromycin for HAP  - Follow up RVP, sputum culture  - follow Bcx  - monitor fever curve  - IR thoracentesis w/ appropriate studies      #ESRD   :: on iHD (TTS) s/p 2 time renal transplant (1992, 2013)  :: now with impaired allograft function currently on immunosuppression (prednisone, tacrolimus)  :: last admit  transplant nephrology recommended hold tacrolimus 1 mg BID and continue with prednisone 5 mg daily  - Consult general nephrology for iHD (TTS)   - Plans to receive UF today 9/11  - consulted transplant nephrology, recommended following with general nephrology as above and outpatient follow up  - c/w home prednisone 5mg daily  - continue tacrolimus 0.5 mg BID per transplant nephrology from last admission   - c/w home renal vitamins     #Hx Gastroparesis   #Nausea and vomiting   :: S/p GES on April 2024 revealing gastroparesis.   :: was seen at the GI clinic in 6/2024 recommended c/w  Reglan dosing of 5mg before dinner and at bedtime and Pantoprazole 40 mg daily  ::treated with erythromycin last admit  Plan:  - c/w home PPI   - c/w metoclopramide scheduled      #DM2  :: Last A1c 6.6 (7/10/2024)  :: Home insulin (Glargine 20 units at bed time and SSI)  - started glargine 10 units nightly as patient wit N/V  - SSI #1     #Anemia  #Chronic thrombocytopenia   :: Hb 9.2 BL 7.2-8.7  :: last admit: Iron 13, TIBC 143 (L), sat 9 (L), Ferritin 985 (H)  :: Most likely related to ESRD  Plan:  - continue to monitor      F: PRN  E: Replete lytes PRN, K>4, Mg >2  Diet: Renal diet  Access: PIV, LUE AVF  Bowel regimen: miralax     DVT prophylaxis: Heparin sq/SCDs  GI prophylaxis: PPI     Code status: Full code (Discussed with patient at bedside upon admission)   NOK: Amalia Enriquez - Friend ([phone])           Kei Luther DO  PGY-1 Internal Medicine

## 2024-09-11 NOTE — NURSING NOTE
.Report to Receiving RN:    Report To: [facility]  Time Report Called: [id number]  Hand-Off Communication: Pt tolerated HD well with no issue. Fluid remove 3 Liter Post /62 HR 70  Complications During Treatment: No  Ultrafiltration Treatment: No  Medications Administered During Dialysis: No  Blood Products Administered During Dialysis: No  Labs Sent During Dialysis: No  Heparin Drip Rate Changes: No  Dialysis Catheter Dressing: AVF  Last Dressing Change: N/A

## 2024-09-11 NOTE — NURSING NOTE
Report from Sending RN:    Report From: [facility]  Recent Surgery of Procedure: No  Baseline Level of Consciousness (LOC) A/O X 4   Oxygen Use: Yes 3L pulse ox 88-95%  Type: NC  Diabetic: Yes, last   Last BP Med Given Day of Dialysis: yes, see EMAR  Last Pain Med Given: yes, see EMAR  Lab Tests to be Obtained with Dialysis: No  Blood Transfusion to be Given During Dialysis: No  Available IV Access: Yes  Medications to be Administered During Dialysis: No  Continuous IV Infusion Running: No  Restraints on Currently or in the Last 24 Hours: No  Hand-Off Communication: full code, contact plus precautions  Dialysis Catheter Dressing: pt has a fistula  Last Dressing Change: pt has a fistula

## 2024-09-11 NOTE — H&P
MEDICINE ADMISSION NOTE    History of Present Illness  Manolo Bashir is a 69 y.o. male with PMHx of ESRD (on dialysis; TThS schedule; s/p 2x renal transplant - 1992, 2013), now with impaired allograft function currently on immunosuppression (prednisone, tacrolimus), IgG and IgA lambda MGUS (recent hematologic eval including Bmbx with no e/o myeloma), HTN, prostate cancer (s/p radical prostatectomy), HCV (s/p rx; PCR neg 10/2023), and gastroparesis.    He presents today due to fever, SOB, dry cough, nausea and vomiting. Patient reports his cough started back up again after completing antibiotics during previous hospitalization for which he was treated for pna (see HC below), otherwise the remainder of his symptoms began this morning and he felt more ill after his HD session today. Additionally, reports acute on chronic lower back pain that worsened after his HD session. He denies diarrhea or chills. Last BM was today, denying melena or hematochezia. He mentions a 15minutes episode of non-pleuritic, non-exertional- non-reproducible CP that he says is typical of his HTN episodes and an additional episode of this CP during HD. Reports he took some of his medications today but not all, has charted history of multiple admits for HTN due to non-adherence.    Of note patient recently admitted 8/24-9/1, with dry cough and vomiting. He was found with CXR showing R basilar consolidation and CT AP w/o contrast only demonstrated bilateral plural effusion and right basilar consolidation. He was started on cefepime and azithromycin in the ED and transferred to the floor same day. C. Diff testing PCR (+), EIA (-) in the setting of diarrhea. On the floor, he was commenced on PO Vancomycin for c. Diff decolonization and IV Zosyn (8/25-8/28) transitioned to Unasyn 8/28, with an initial 3-day course of Azithromycin for suspected pneumonia. He was also given erythromycin, which was transitioned to metoclopramide, for gastroparesis. He  had labile BP throughout his course. He arrived hypertensive and was hypotensive on 8/26 with concerns that erythromycin had increased effects of home nifedipine. Patient was resumed on home hypertensive medication regimen of carvedilol, imdur, and nifedipine and home dose, with reduction in hydralazine dose to 50 mg TID prior to discharge.     Social: denies, tobacco, alcohol, illicits      ED Course:  /89, T 102, RR 20, HR 96, 97% on RA    Labs:   CBC: WBC 6.7 , HGB 9.2,   BMP: , K 4.4, Cl 97, HCO3 33, BUN 26, CR 4.15, Glu 201  LFTS: AST 16 , ALT 20, ALKPHOS 121 , TBILI 0.7 , DBILI 0.2  BNP 1059  Trop 52  UA:   Results from last 7 days   Lab Units 09/10/24  1920   COLOR U  Light-Yellow   PH U  8.0   SPEC GRAV UR  1.006   PROTEIN U mg/dL 200 (2+)*   BLOOD UR  0.03 (TRACE)*   NITRITE U  NEGATIVE   WBC UR /HPF 1-5   Blood cultures collected and pending  MRSA nares negative  Covid and flu negative  VBG pH 7.49, pO2 43, pO2 49, lactate 1.0      EKG  NSR, without significant change from 8/27, Qtc 435    Imaging:  CT abdomen pelvis wo IV contrast    Result Date: 9/10/2024  STUDY: CT Abdomen and Pelvis without IV Contrast; 09/10/2024 at 6:19 PM INDICATION: Flank and back pain. COMPARISON: CT abdomen/pelvis 08/24/24, 11/15/23, 10/06/23, 10/02/23.  renal transplant 08/29/24, 01/16/24. ACCESSION NUMBER(S): [id number] ORDERING CLINICIAN: MIKAELA ELIZABETH TECHNIQUE: CT of the abdomen and pelvis was performed.  Contiguous axial images were obtained at 3 mm slice thickness through the abdomen and pelvis. Coronal and sagittal reconstructions at 3 mm slice thickness were performed. No intravenous contrast was administered.  Automated mA/kV exposure control was utilized and patient examination was performed in strict accordance with principles of ALARA. FINDINGS: Please note that the evaluation of vessels, lymph nodes and organs is limited without intravenous contrast.  LOWER CHEST: There is cardiomegaly. No  pericardial effusion.  Bilateral pleural effusions, right larger than left, with adjacent atelectasis.  ABDOMEN:  LIVER: No hepatomegaly.  Smooth surface contour.  Normal attenuation.  BILE DUCTS: No intrahepatic or extrahepatic biliary ductal dilatation.  GALLBLADDER: The gallbladder is absent. STOMACH: No abnormalities identified.  PANCREAS: No masses or ductal dilatation.  SPLEEN: No splenomegaly or focal splenic lesion.  ADRENAL GLANDS: No thickening or nodules.  KIDNEYS AND URETERS: Transplant kidney left lower quadrant without hydronephrosis. Severely atrophic bilateral kidneys. No hydronephrosis. PELVIS:  BLADDER: Slightly thick-walled urinary bladder which is not overly distended. The prostate gland appears absent.  REPRODUCTIVE ORGANS: No abnormalities identified.  BOWEL: Previous small bowel surgeries are seen. Status post right colectomy. Moderate rectal stool suggesting constipation. Rectum measures up to 5 cm in diameter.  VESSELS: No abnormalities identified.  Abdominal aorta is normal in caliber. Diffuse splenic artery atherosclerosis.  PERITONEUM/RETROPERITONEUM/LYMPH NODES: No free fluid.  No pneumoperitoneum. No lymphadenopathy.  ABDOMINAL WALL: No abnormalities identified. SOFT TISSUES: No abnormalities identified.  BONES: No acute fracture or aggressive osseous lesion. Mild degenerative changes are seen.    1. No acute findings in the abdomen or pelvis. No free air or bowel obstruction. 2. Moderate rectal stool. Correlate for constipation. 3. Pleural effusions with basilar atelectasis. 4. Nonspecific mild bladder wall thickening which is unchanged. Correlate for cystitis. Signed by Gus Wheeler MD    XR chest 2 views    Result Date: 9/10/2024  Interpreted By:  James Prasad and Elsamaloty Mazzin STUDY: XR CHEST 2 VIEWS;  9/10/2024 2:11 pm   INDICATION: Signs/Symptoms:cough/ previous pneumonia.     COMPARISON: Radiographs the chest dated 08/27/2024, 08/26/2024, and 08/24/2024.   ACCESSION  NUMBER(S): [id number]   ORDERING CLINICIAN: RAMY DÍAZ   FINDINGS: PA and lateral views of the chest were obtained.   There is redemonstration of a left-sided axillary vascular graft in similar position at the comparison exam.   CARDIOMEDIASTINAL SILHOUETTE: Cardiomediastinal silhouette is enlarged, but stable in size and configuration.   LUNGS: There is slightly decreased pulmonary interstitial edema compared to the study dated 08/27/2024. There has been interval improvement of the right basilar opacity and associated air bronchogram which was first  appreciated on 08/24/2024.   ABDOMEN: Visualized portions of the abdomen are unremarkable.   BONES: The visualized osseous structures are unremarkable.       1. Slightly improved pulmonary interstitial edema. 2. Interval improvement, of right basilar opacity and associated air bronchograms. Finding is consistent with pneumonia in the proper clinical setting.   I personally reviewed the images/study and I agree with the findings as stated by Laurie Pan MD (resident). This study was interpreted at [place], [place].   MACRO: None   Signed by: James Prasad 9/10/2024 2:42 PM Dictation workstation:   [id number]      ED Interventions:  Zosyn, vanc, azithromycin, hydralazone 50mg po, dilaudid 0.5 mg IV x2, coreg 37.5 po, labetalol IV 20mg, hydralazine 10mg IV    Cardiac Hx:  TTE 4/5/2024  CONCLUSIONS:   1. Left ventricular systolic function is normal with a 54% estimated ejection fraction.   2. Spectral Doppler shows a pseudonormal pattern of left ventricular diastolic filling.   3. Left ventricular cavity size is severely dilated.   4. Moderately increased left ventricular septal thickness.   5. There is severe left ventricular hypertrophy.   6. Mild to moderate aortic valve regurgitation.   7. The left atrium is severely dilated.   8. The right atrium is severely dilated.   9. Mildly elevated RVSP.  10. Compared  with study from 11/18/2023, the LV is now severely dilated. The degree of aortic regurgitation appears unchanged.  Last EKG:   Encounter Date: 09/10/24   ECG 12 lead   Result Value    Ventricular Rate 94    Atrial Rate 94    DC Interval 174    QRS Duration 132    QT Interval 348    QTC Calculation(Bazett) 435    P Axis 83    R Axis -20    T Axis 51    QRS Count 15    Q Onset 217    P Onset 130    P Offset 175    T Offset 391    QTC Fredericia 404    Narrative    Poor data quality, interpretation may be adversely affected  Normal sinus rhythm  Nonspecific intraventricular block  Minimal voltage criteria for LVH, may be normal variant ( [place] product )  Abnormal ECG  When compared with ECG of 27-AUG-2024 16:45,  Nonspecific intraventricular block has replaced Incomplete left bundle branch block    See ED provider note for full interpretation and clinical correlation  Confirmed by Millicent Sheikh ([id number]) on 9/10/2024 11:09:03 PM        Historical Data:  US kidney transplant    Result Date: 8/29/2024  Interpreted By:  Leo Luke,  and Desirae Ruelas STUDY: US KIDNEY TRANSPLANT;  8/29/2024 9:27 am   INDICATION: Signs/Symptoms:tenderness of renal transplant. Per EMR: Renal transplant in 1992 with rejection in 2006, additional transplant in 2013 with rejection in May of 2024. Currently on hemodialysis.   ,Z94.0 Kidney transplant status ([facility])   COMPARISON: CT abdomen pelvis 08/24/2024   ACCESSION NUMBER(S): [id number]   ORDERING CLINICIAN: JEFFREY CORTEZ   TECHNIQUE: Grayscale, color, and spectral Doppler of the kidney transplant were performed.  This examination was interpreted at [facility].   FINDINGS: TRANSPLANT KIDNEY: Transplant kidney location:  Left iliac fossa. The transplant kidney boatulct11.3 cm in craniocaudal dimension. There is mild hydronephrosis. No perinephric fluid collections are seen. Diffusely echogenic appearance of the renal parenchyma.    DOPPLER EVALUATION:   RESISTIVE INDICES: The resistive indices were as follows: 0.72/0.75 in the superior pole, 0.74/0.79 in the midpole, and 0.79/0.73 in the inferior pole. Wave forms are normal.   TRANSPLANT RENAL ARTERY AND VEIN: The main renal artery velocity is 124.8 cm/s.  The arterial anastomosis velocity is 98 cm/s. The adjacent iliac artery velocity is 119 cm/s Transplant renal vein is patent. The peak velocity in the main renal vein is 15.3 cm/s,17.8 cm/s, in the adjacent iliac vein 18 cm/s.  The venous anastomosis velocity is 13 cm/s.       1. Left iliac fossa transplant kidney with mild hydronephrosis and diffusely echogenic parenchyma. Unremarkable vascular evaluation of the transplant kidney. Findings are nonspecific but can be seen with chronic rejection.     I personally reviewed the image(s)/study and resident interpretation as stated by Dr. Jessenia Merrill MD. I agree with the findings as stated. This study was interpreted at [facility], [place], [place].   MACRO: None     Signed by: Leo Luke 8/29/2024 10:19 AM Dictation workstation:   [id number]    Electrocardiogram, 12-lead PRN ACS symptoms    Result Date: 8/28/2024  Normal sinus rhythm Right axis deviation Incomplete left bundle branch block Left ventricular hypertrophy with repolarization abnormality Prolonged QT Abnormal ECG When compared with ECG of 26-AUG-2024 13:22, Incomplete left bundle branch block has replaced Nonspecific intraventricular block Criteria for Lateral infarct are no longer Present Confirmed by Steve Lewis ([id number]) on 8/28/2024 8:40:19 PM    XR chest 1 view    Result Date: 8/28/2024  Interpreted By:  Emile Shah, STUDY: XR CHEST 1 VIEW; 8/27/2024 6:29 am   INDICATION: Signs/Symptoms:pulmonary edema.   COMPARISON: 08/26/2024.   ACCESSION NUMBER(S): [id number]   ORDERING CLINICIAN: JEFFREY CORTEZ   FINDINGS: Left axillary stent placement.   CARDIOMEDIASTINAL  SILHOUETTE: Cardiomegaly versus pericardial effusion.   LUNGS: No significant change in perihilar edema.   ABDOMEN: No remarkable upper abdominal findings.   BONES: No acute osseous changes.       1.  There is no significant change in perihilar edema and correlate with cardiac and fluid status.     Signed by: Emile Shah 8/28/2024 6:43 PM Dictation workstation:   [id number]    Electrocardiogram, 12-lead PRN ACS symptoms    Result Date: 8/27/2024  Sinus rhythm with frequent Premature ventricular complexes Nonspecific intraventricular block Cannot rule out Anterior infarct , age undetermined Abnormal ECG When compared with ECG of 24-AUG-2024 12:11, Premature ventricular complexes are now Present QRS axis Shifted left Nonspecific T wave abnormality no longer evident in Lateral leads Confirmed by Muna Murillo ([id number]) on 8/27/2024 5:56:33 PM      US thoracentesis    Result Date: 8/26/2024  Interpreted By:  Suly Hodgson, STUDY: US THORACENTESIS;  8/26/2024 10:15 am   INDICATION: Signs/Symptoms:thoracentessis.   COMPARISON: None.   ACCESSION NUMBER(S): [id number]   ORDERING CLINICIAN: JEFFREY CORTEZ   TECHNIQUE: INTERVENTIONALIST(S): Suly Hodgson PA-C   CONSENT: The patient/patient's POA/next of kin was informed of the nature of the proposed procedure. The purposes, alternatives, risks, and benefits were explained and discussed. All questions were answered and consent was obtained.   SEDATION: None   MEDICATION/CONTRAST: No additional   TIME OUT: A time out was performed immediately prior to procedure start with the interventional team, correctly identifying the patient name, date of birth, MRN, procedure, anatomy (including marking of site and side), patient position, procedure consent form, relevant laboratory and imaging test results, antibiotic administration, safety precautions, and procedure-specific equipment needs.   FINDINGS: The patient was placed in the sitting position.   The pleural space was  examined with grey scale ultrasound, and the most accessible fluid identified and marked for thoracentesis.   The skin was prepped and draped in usual manner. Local anesthesia with Lidocaine was administered and a  right-sided thoracentesis was performed.  A 5 [place]ais One-Step thoracentesis needle/catheter was then placed where marked.  Approximately 850 mL of serosanguinous colored fluid was removed.  The needle/catheter was then withdrawn.   The patient tolerated the procedure well and there were no immediate complications. Specimen(s) sent to the laboratory and pathology for further evaluation, per the requesting team.       Uneventful  right-sided thoracentesis, as detailed above.   I personally performed and/or directly supervised this study and was present for the entire procedure.   I personally reviewed the study and resident interpretation. I agree with the findings as stated.   Performed and dictated at [facility].   MACRO: None   Signed by: Suly Hodgson 8/26/2024 5:15 PM Dictation workstation:   [id number]      CT abdomen pelvis wo IV contrast    Result Date: 8/24/2024  Interpreted By:  Leo Luke,  Fred Pierson STUDY: CT ABDOMEN PELVIS WO IV CONTRAST;  8/24/2024 2:55 pm   INDICATION: Signs/Symptoms:Tender RUQ and RLQ with fever.   COMPARISON: CT abdomen and pelvis 05/11/2024   ACCESSION NUMBER(S): [id number]   ORDERING CLINICIAN: MIKAELA ELIZABETH   TECHNIQUE: CT of the abdomen and pelvis was performed. Contiguous axial images were obtained at 3 mm slice thickness through the abdomen and pelvis. Coronal and sagittal reconstructions at 3 mm slice thickness were performed.   FINDINGS: Please note that the evaluation of vessels, lymph nodes and organs is limited without intravenous contrast.   LOWER CHEST: Moderate right pleural effusion. Small left pleural effusion. Mild adjacent lung atelectasis is noted. A few ground-glass opacities noted in the  bilateral lower lobes which can be seen in the setting of pulmonary edema.. The heart is enlarged.  No evidence of pericardial effusion.   ABDOMEN:   LIVER: The liver is normal in size without evidence of focal lesions.   BILE DUCTS: The intrahepatic and extrahepatic ducts are not dilated.   GALLBLADDER: The gallbladder is not definitively visualized and may be decompressed versus surgically absent.   PANCREAS: The pancreas appears unremarkable without evidence of ductal dilatation or masses.   SPLEEN: The spleen is normal in size with no evidence of focal lesions.   ADRENAL GLANDS: Bilateral adrenal glands appear normal.   KIDNEYS AND URETERS: The bilateral kidneys are atrophic. Left iliac fossa renal transplant without associated perinephric fluid collection or hydroureteronephrosis..   PELVIS:   BLADDER: The urinary bladder is decompressed, limited for evaluation. There is mild bladder wall thickening, similar to prior, and suspected to be secondary to bladder decompression.   REPRODUCTIVE ORGANS: The prostate is not enlarged.   BOWEL: The stomach is unremarkable. The small bowel is normal in caliber without evidence of wall thickening throughout. Status post partial colectomy. The large bowel is otherwise normal in caliber and demonstrates no abnormal wall thickening. The appendix is not definitely visualized. There is however no pericecal stranding or fluid.   VESSELS: Diffuse vascular calcifications throughout the abdomen. The aorta and IVC otherwise unremarkable.   PERITONEUM/RETROPERITONEUM/LYMPH NODES: There is no free or loculated fluid collection, no free intraperitoneal air. The retroperitoneum appears normal.  No abdominopelvic lymphadenopathy is present.   ABDOMINAL WALL: The abdominal wall soft tissues appear normal.   BONES: No suspicious osseous lesions are identified. Degenerative discogenic disease is noted in the lower thoracic and lumbar spine. Mild compression deformity of the T10 vertebral  body is likely degenerative.       1.  Bilateral pleural effusions right-greater-than-left with the ground-glass opacities in the bilateral lower lobes. Findings can be seen with pulmonary edema.. 2. No acute pathology within the abdomen or pelvis to explain right-sided abdominal pain.   I personally reviewed the images/study and resident's interpretation and I agree with the findings as stated by Kenna Barrientos MD (resident radiologist). This study was analyzed and interpreted at [place], [place].   MACRO: None   Signed by: Leo Luke 8/24/2024 4:16 PM Dictation workstation:   [id number]      Past Medical History  Past Medical History:   Diagnosis Date    Anemia     Arthritis     Cataract     Chronic kidney disease, stage 3 unspecified (Multi) 09/26/2018    Stage 3 chronic kidney disease    CKD (chronic kidney disease)     stage V    COVID-19 06/18/2020    COVID-19 virus infection    Diabetes (Multi)     ESRD (end stage renal disease) (Multi)     Focal and segmental proliferative glomerulonephritis 12/23/2023    HTN (hypertension)     Hyperlipidemia     Other long term (current) drug therapy 07/20/2021    High risk medication use    Personal history of other diseases of the circulatory system     Personal history of cardiac murmur    Personal history of other infectious and parasitic diseases 08/17/2015    History of hepatitis    Polyp, colonic 08/17/2023    Primary osteoarthritis of both ankles 08/17/2023    Prostate cancer (Multi)     Tubular adenoma of colon 08/17/2023    Unspecified kidney failure 08/17/2016    Renal failure       Surgical History  Past Surgical History:   Procedure Laterality Date    ILEOSTOMY  04/25/2017    Ileostomy    ILEOSTOMY CLOSURE  08/17/2015    Ileostomy Closure    OTHER SURGICAL HISTORY  04/21/2017    Right Hemicolectomy    OTHER SURGICAL HISTORY  08/17/2015    Arteriovenous Surgery Creation Of A-V Fistula    OTHER SURGICAL  HISTORY  08/17/2015    Sigmoidoscopy (Fiberoptic, Therapeutic )    PROSTATECTOMY  10/11/2013    Prostatectomy Radical    TRANSPLANT, KIDNEY, OPEN  1992    TRANSPLANT, KIDNEY, OPEN  2013    US GUIDED PERCUTANEOUS BIOPSY RENAL LEFT Left 11/20/2023    US GUIDED PERCUTANEOUS BIOPSY RENAL LEFT 11/20/2023 Lou Rodgers MD [facility]    US GUIDED PERCUTANEOUS PERITONEAL OR RETROPERITONEAL FLUID COLLECTION DRAINAGE  10/20/2022    US GUIDED PERCUTANEOUS PERITONEAL OR RETROPERITONEAL FLUID COLLECTION DRAINAGE 10/20/2022 [facility]ICAL [facility]GAC[facility]       Social History  He reports that he has never smoked. He has been exposed to tobacco smoke. He has never used smokeless tobacco. He reports current alcohol use. He reports current drug use. Drug: Oxycodone.    Family History  Family History   Problem Relation Name Age of Onset    Bone cancer Mother      Other (corona's sarcome of the bone marrow) Mother      Prostate cancer Father      Diabetes Other Family Hist     Hypertension Other Family Hist         Allergies  Patient has no known allergies.     Physical Exam  Constitutional:       Appearance: He is ill-appearing.      Comments: Appears tired   HENT:      Nose: No congestion or rhinorrhea.   Eyes:      Extraocular Movements: Extraocular movements intact.   Cardiovascular:      Rate and Rhythm: Normal rate and regular rhythm.   Pulmonary:      Effort: Pulmonary effort is normal.      Breath sounds: Normal breath sounds.   Abdominal:      General: Abdomen is flat. There is no distension.      Palpations: Abdomen is soft. There is no mass.      Tenderness: There is abdominal tenderness. There is no guarding.      Comments: Mild diffuse TTP   Musculoskeletal:      Right lower leg: Edema present.      Left lower leg: Edema present.      Comments: Mild 1+ non-pitting BLE edema   Skin:     General: Skin is warm and dry.   Neurological:      General: No focal deficit present.      Mental Status: He is alert and oriented to person, place, and  time.   Psychiatric:         Mood and Affect: Mood normal.         Behavior: Behavior normal.         Medications  Scheduled medications  aspirin, 81 mg, oral, Daily  azithromycin, 500 mg, intravenous, q24h  B complex-vitamin C-folic acid, 1 capsule, oral, Daily  [START ON 9/12/2024] bumetanide, 2 mg, oral, Once per day on Sunday Tuesday Thursday Saturday  calcitriol, 0.5 mcg, oral, Daily  carvedilol, 37.5 mg, oral, BID  cinacalcet, 30 mg, oral, Daily  heparin (porcine), 5,000 Units, subcutaneous, q8h  hydrALAZINE, 50 mg, oral, TID  insulin glargine, 10 Units, subcutaneous, Nightly  insulin lispro, 0-5 Units, subcutaneous, TID  isosorbide mononitrate ER, 60 mg, oral, Daily  NIFEdipine ER, 60 mg, oral, BID  pantoprazole, 40 mg, oral, Daily before breakfast  piperacillin-tazobactam, 2.25 g, intravenous, q8h  pravastatin, 10 mg, oral, Nightly  predniSONE, 5 mg, oral, q AM  tacrolimus, , Topical, BID      Continuous medications     PRN medications  PRN medications: acetaminophen, dextrose, dextrose, glucagon, glucagon, hydrALAZINE, ondansetron **OR** ondansetron      Assessment/Plan   Manolo Bashir is a 69 y.o. male with PMHx ESRD on iHD (TTS) s/p 2 time renal transplant (1992, 2013), now with impaired allograft function currently on immunosuppression (prednisone, tacrolimus), history of IgG and IgA lambda MGUS (recent hematologic eval including Bmbx with no e/o myeloma), DM2, HTN, prostate cancer s/p radical prostatectomy, baseline urinary incontinence, HCV s/p rx (PCR neg 10/2023) presenting to the ED with fever, dry cough, nausea,  vomiting, and HTN with SBP to 200. He also endorsed occasional episodes of CP typical of HTN episodes although EKG without acute changes and trop negative. BNP notably at >1k despite HD session today.    In the ED patient was treated with Zosyn, vanc, azithromycin, hydralazine 50mg po, dilaudid 0.5 mg IV x2, coreg 37.5 po, labetalol IV 20mg, hydralazine 10mg IV without much improvement in  BP. On admit to medicine gave now dose of patient's nicardipine 60mg and started hydralazine 25mg QID po prn for SBP >180 with interval improvement in SBP to 160s.    ED imaging remarkable for CXR showing Slightly improved pulmonary interstitial edema, Interval improvement, of right basilar opacity and associated air bronchograms which may be indicative of resolving pna from recent admission. CT A/P without contrast negative for acute abdominal pathology, but did show moderate rectal stool and pleural effusions with basilar atelectasis.      Patient admitted to medicine for further management. Will continue with zosyn and azithromycin (MRSA nares in ED negative) for now given CXR findings, fever 102, cough, and positive SIRS criteria and treat HTN urgency as outlined below.       #Hypertensive urgency  :: BNP >1k on presentation  :: CT with pleural effusions  Plan:  - c/w home Hydralazine 50 mg TID, carvedilol 37.5 mg, BID  - prn hydralazine 25mg po QID for SBP >180  - c/w home Nifedipine 60 mg daily  - c/w home imdur 60mg daily  - c/w home Bumex 2 mg po on non-HD days (sun, [facility])  - consider IV diuresis inpatient    #HLD  - c/w home asa and pravastatin     #c/f HAP  #Positive SIRs   :: CXR which demonstrated right basilar consolidation, improved from previous  :: CT AP wo contrast only demonstrated bilateral plural effusion and moderate stool burden  :: No leukocytosis  :: Started on vanc, zosyn and azithromycin in the ED   :: MRSA, covid, flu negative  :: UA bland  Plan:  - c/w zosyn and azithromycin for HAP  - Ordered procal, RVP, sputum culture  - follow Bcx  - monitor fever curve    #ESRD   :: on iHD (TTS) s/p 2 time renal transplant (1992, 2013)  :: now with impaired allograft function currently on immunosuppression (prednisone, tacrolimus)  :: last admit  transplant nephrology recommended hold tacrolimus 1 mg BID and continue with prednisone 5 mg daily  - Consult general nephrology for iHD (TTS)  - consult  transplant nephrology  - c/w home prednisone 5mg daily  - hold tacrolimus as this was recommended per transplant nephro during recent admission  - c/w home renal vitamins     #Hx Gastroparesis   #Nausea and vomiting   :: S/p GES on April 2024 revealing gastroparesis.   :: was seen at the GI clinic in 6/2024 recommended c/w Reglan dosing of 5mg before dinner and at bedtime and Pantoprazole 40 mg daily  ::treated with erythromycin last admit  Plan:  - c/w home PPI   - c/w metoclopramide prn    #DM2  :: Last A1c 6.6 (7/10/2024)  :: Home insulin (Glargine 20 units at bed time and SSI)  - started glargine 10 units nightly as patient wit N/V  - SSI #1       #Anemia  #Chronic thrombocytopenia   :: Hb 9.2 BL 7.2-8.7  :: last admit: Iron 13, TIBC 143 (L), sat 9 (L), Ferritin 985 (H)  :: Most likely related to ESRD  Plan:  - continue to monitor      F: PRN  E: Replete lytes PRN, K>4, Mg >2  Diet: Renal diet  Access: PIV, LUE AVF  Bowel regimen: miralax     DVT prophylaxis: Heparin sq/SCDs  GI prophylaxis: PPI     Code status: Full code (Discussed with patient at bedside upon admission)   NOK: Amalia Enriquez - Friend ([phone])        Saniya Jim MD

## 2024-09-12 ENCOUNTER — HOME CARE VISIT (OUTPATIENT)
Dept: HOME HEALTH SERVICES | Facility: HOME HEALTH | Age: 69
End: 2024-09-12
Payer: COMMERCIAL

## 2024-09-12 ENCOUNTER — APPOINTMENT (OUTPATIENT)
Dept: DIALYSIS | Facility: HOSPITAL | Age: 69
End: 2024-09-12
Payer: COMMERCIAL

## 2024-09-12 ENCOUNTER — APPOINTMENT (OUTPATIENT)
Dept: RADIOLOGY | Facility: HOSPITAL | Age: 69
End: 2024-09-12
Payer: COMMERCIAL

## 2024-09-12 LAB
ALBUMIN SERPL BCP-MCNC: 2.8 G/DL (ref 3.4–5)
ALBUMIN SERPL BCP-MCNC: 3.1 G/DL (ref 3.4–5)
ALP SERPL-CCNC: 69 U/L (ref 33–136)
ALT SERPL W P-5'-P-CCNC: 11 U/L (ref 10–52)
ANION GAP SERPL CALC-SCNC: 12 MMOL/L (ref 10–20)
AST SERPL W P-5'-P-CCNC: 12 U/L (ref 9–39)
BASOPHILS # BLD AUTO: 0.02 X10*3/UL (ref 0–0.1)
BASOPHILS NFR BLD AUTO: 0.7 %
BILIRUB DIRECT SERPL-MCNC: 0.1 MG/DL (ref 0–0.3)
BILIRUB SERPL-MCNC: 0.5 MG/DL (ref 0–1.2)
BUN SERPL-MCNC: 43 MG/DL (ref 6–23)
CALCIUM SERPL-MCNC: 8.3 MG/DL (ref 8.6–10.6)
CHLORIDE SERPL-SCNC: 98 MMOL/L (ref 98–107)
CO2 SERPL-SCNC: 27 MMOL/L (ref 21–32)
CREAT SERPL-MCNC: 6.93 MG/DL (ref 0.5–1.3)
EGFRCR SERPLBLD CKD-EPI 2021: 8 ML/MIN/1.73M*2
EOSINOPHIL # BLD AUTO: 0.18 X10*3/UL (ref 0–0.7)
EOSINOPHIL NFR BLD AUTO: 5.9 %
ERYTHROCYTE [DISTWIDTH] IN BLOOD BY AUTOMATED COUNT: 17.7 % (ref 11.5–14.5)
GLUCOSE BLD MANUAL STRIP-MCNC: 134 MG/DL (ref 74–99)
GLUCOSE BLD MANUAL STRIP-MCNC: 281 MG/DL (ref 74–99)
GLUCOSE SERPL-MCNC: 211 MG/DL (ref 74–99)
HCT VFR BLD AUTO: 25.9 % (ref 41–52)
HGB BLD-MCNC: 8.3 G/DL (ref 13.5–17.5)
IMM GRANULOCYTES # BLD AUTO: 0.01 X10*3/UL (ref 0–0.7)
IMM GRANULOCYTES NFR BLD AUTO: 0.3 % (ref 0–0.9)
LYMPHOCYTES # BLD AUTO: 0.68 X10*3/UL (ref 1.2–4.8)
LYMPHOCYTES NFR BLD AUTO: 22.4 %
MAGNESIUM SERPL-MCNC: 1.96 MG/DL (ref 1.6–2.4)
MCH RBC QN AUTO: 28.2 PG (ref 26–34)
MCHC RBC AUTO-ENTMCNC: 32 G/DL (ref 32–36)
MCV RBC AUTO: 88 FL (ref 80–100)
MONOCYTES # BLD AUTO: 0.42 X10*3/UL (ref 0.1–1)
MONOCYTES NFR BLD AUTO: 13.9 %
NEUTROPHILS # BLD AUTO: 1.72 X10*3/UL (ref 1.2–7.7)
NEUTROPHILS NFR BLD AUTO: 56.8 %
NRBC BLD-RTO: 0 /100 WBCS (ref 0–0)
PHOSPHATE SERPL-MCNC: 5 MG/DL (ref 2.5–4.9)
PLATELET # BLD AUTO: 110 X10*3/UL (ref 150–450)
POTASSIUM SERPL-SCNC: 4.1 MMOL/L (ref 3.5–5.3)
PROT SERPL-MCNC: 6.1 G/DL (ref 6.4–8.2)
RBC # BLD AUTO: 2.94 X10*6/UL (ref 4.5–5.9)
SODIUM SERPL-SCNC: 133 MMOL/L (ref 136–145)
WBC # BLD AUTO: 3 X10*3/UL (ref 4.4–11.3)

## 2024-09-12 PROCEDURE — 85025 COMPLETE CBC W/AUTO DIFF WBC: CPT

## 2024-09-12 PROCEDURE — 84157 ASSAY OF PROTEIN OTHER: CPT

## 2024-09-12 PROCEDURE — 2500000002 HC RX 250 W HCPCS SELF ADMINISTERED DRUGS (ALT 637 FOR MEDICARE OP, ALT 636 FOR OP/ED)

## 2024-09-12 PROCEDURE — 99233 SBSQ HOSP IP/OBS HIGH 50: CPT

## 2024-09-12 PROCEDURE — 36415 COLL VENOUS BLD VENIPUNCTURE: CPT

## 2024-09-12 PROCEDURE — 1200000002 HC GENERAL ROOM WITH TELEMETRY DAILY

## 2024-09-12 PROCEDURE — 2500000004 HC RX 250 GENERAL PHARMACY W/ HCPCS (ALT 636 FOR OP/ED): Performed by: INTERNAL MEDICINE

## 2024-09-12 PROCEDURE — 2500000004 HC RX 250 GENERAL PHARMACY W/ HCPCS (ALT 636 FOR OP/ED)

## 2024-09-12 PROCEDURE — 83735 ASSAY OF MAGNESIUM: CPT

## 2024-09-12 PROCEDURE — 0W993ZZ DRAINAGE OF RIGHT PLEURAL CAVITY, PERCUTANEOUS APPROACH: ICD-10-PCS | Performed by: STUDENT IN AN ORGANIZED HEALTH CARE EDUCATION/TRAINING PROGRAM

## 2024-09-12 PROCEDURE — 6350000001 HC RX 635 EPOETIN >10,000 UNITS: Performed by: INTERNAL MEDICINE

## 2024-09-12 PROCEDURE — 84100 ASSAY OF PHOSPHORUS: CPT

## 2024-09-12 PROCEDURE — 82248 BILIRUBIN DIRECT: CPT

## 2024-09-12 PROCEDURE — 82947 ASSAY GLUCOSE BLOOD QUANT: CPT

## 2024-09-12 PROCEDURE — 8010000001 HC DIALYSIS - HEMODIALYSIS PER DAY

## 2024-09-12 PROCEDURE — 2500000001 HC RX 250 WO HCPCS SELF ADMINISTERED DRUGS (ALT 637 FOR MEDICARE OP)

## 2024-09-12 PROCEDURE — 82945 GLUCOSE OTHER FLUID: CPT

## 2024-09-12 PROCEDURE — 89051 BODY FLUID CELL COUNT: CPT

## 2024-09-12 PROCEDURE — 90935 HEMODIALYSIS ONE EVALUATION: CPT | Performed by: INTERNAL MEDICINE

## 2024-09-12 PROCEDURE — 83615 LACTATE (LD) (LDH) ENZYME: CPT

## 2024-09-12 PROCEDURE — 32555 ASPIRATE PLEURA W/ IMAGING: CPT

## 2024-09-12 PROCEDURE — 83986 ASSAY PH BODY FLUID NOS: CPT

## 2024-09-12 RX ORDER — AZITHROMYCIN 500 MG/1
500 TABLET, FILM COATED ORAL DAILY
Status: COMPLETED | OUTPATIENT
Start: 2024-09-13 | End: 2024-09-13

## 2024-09-12 SDOH — ECONOMIC STABILITY: FOOD INSECURITY: WITHIN THE PAST 12 MONTHS, THE FOOD YOU BOUGHT JUST DIDN'T LAST AND YOU DIDN'T HAVE MONEY TO GET MORE.: NEVER TRUE

## 2024-09-12 SDOH — SOCIAL STABILITY: SOCIAL INSECURITY: DO YOU FEEL UNSAFE GOING BACK TO THE PLACE WHERE YOU ARE LIVING?: NO

## 2024-09-12 SDOH — HEALTH STABILITY: PHYSICAL HEALTH: ON AVERAGE, HOW MANY DAYS PER WEEK DO YOU ENGAGE IN MODERATE TO STRENUOUS EXERCISE (LIKE A BRISK WALK)?: 7 DAYS

## 2024-09-12 SDOH — SOCIAL STABILITY: SOCIAL INSECURITY: WITHIN THE LAST YEAR, HAVE YOU BEEN HUMILIATED OR EMOTIONALLY ABUSED IN OTHER WAYS BY YOUR PARTNER OR EX-PARTNER?: NO

## 2024-09-12 SDOH — ECONOMIC STABILITY: INCOME INSECURITY: HOW HARD IS IT FOR YOU TO PAY FOR THE VERY BASICS LIKE FOOD, HOUSING, MEDICAL CARE, AND HEATING?: NOT HARD AT ALL

## 2024-09-12 SDOH — SOCIAL STABILITY: SOCIAL INSECURITY: HAS ANYONE EVER THREATENED TO HURT YOUR FAMILY OR YOUR PETS?: NO

## 2024-09-12 SDOH — SOCIAL STABILITY: SOCIAL NETWORK: HOW OFTEN DO YOU ATTEND CHURCH OR RELIGIOUS SERVICES?: NEVER

## 2024-09-12 SDOH — SOCIAL STABILITY: SOCIAL INSECURITY: DO YOU FEEL ANYONE HAS EXPLOITED OR TAKEN ADVANTAGE OF YOU FINANCIALLY OR OF YOUR PERSONAL PROPERTY?: NO

## 2024-09-12 SDOH — ECONOMIC STABILITY: HOUSING INSECURITY: AT ANY TIME IN THE PAST 12 MONTHS, WERE YOU HOMELESS OR LIVING IN A SHELTER (INCLUDING NOW)?: NO

## 2024-09-12 SDOH — SOCIAL STABILITY: SOCIAL INSECURITY: ARE THERE ANY APPARENT SIGNS OF INJURIES/BEHAVIORS THAT COULD BE RELATED TO ABUSE/NEGLECT?: NO

## 2024-09-12 SDOH — ECONOMIC STABILITY: INCOME INSECURITY: IN THE LAST 12 MONTHS, WAS THERE A TIME WHEN YOU WERE NOT ABLE TO PAY THE MORTGAGE OR RENT ON TIME?: NO

## 2024-09-12 SDOH — HEALTH STABILITY: MENTAL HEALTH
HOW OFTEN DO YOU NEED TO HAVE SOMEONE HELP YOU WHEN YOU READ INSTRUCTIONS, PAMPHLETS, OR OTHER WRITTEN MATERIAL FROM YOUR DOCTOR OR PHARMACY?: NEVER

## 2024-09-12 SDOH — ECONOMIC STABILITY: HOUSING INSECURITY: IN THE PAST 12 MONTHS, HOW MANY TIMES HAVE YOU MOVED WHERE YOU WERE LIVING?: 0

## 2024-09-12 SDOH — SOCIAL STABILITY: SOCIAL NETWORK: HOW OFTEN DO YOU ATTENT MEETINGS OF THE CLUB OR ORGANIZATION YOU BELONG TO?: NEVER

## 2024-09-12 SDOH — SOCIAL STABILITY: SOCIAL NETWORK: ARE YOU MARRIED, WIDOWED, DIVORCED, SEPARATED, NEVER MARRIED, OR LIVING WITH A PARTNER?: NEVER MARRIED

## 2024-09-12 SDOH — SOCIAL STABILITY: SOCIAL INSECURITY: HAVE YOU HAD THOUGHTS OF HARMING ANYONE ELSE?: NO

## 2024-09-12 SDOH — SOCIAL STABILITY: SOCIAL INSECURITY: ARE YOU OR HAVE YOU BEEN THREATENED OR ABUSED PHYSICALLY, EMOTIONALLY, OR SEXUALLY BY ANYONE?: NO

## 2024-09-12 SDOH — SOCIAL STABILITY: SOCIAL INSECURITY: WITHIN THE LAST YEAR, HAVE YOU BEEN AFRAID OF YOUR PARTNER OR EX-PARTNER?: NO

## 2024-09-12 SDOH — SOCIAL STABILITY: SOCIAL NETWORK
DO YOU BELONG TO ANY CLUBS OR ORGANIZATIONS SUCH AS CHURCH GROUPS UNIONS, FRATERNAL OR ATHLETIC GROUPS, OR SCHOOL GROUPS?: NO

## 2024-09-12 SDOH — HEALTH STABILITY: PHYSICAL HEALTH: ON AVERAGE, HOW MANY MINUTES DO YOU ENGAGE IN EXERCISE AT THIS LEVEL?: 60 MIN

## 2024-09-12 SDOH — ECONOMIC STABILITY: FOOD INSECURITY: WITHIN THE PAST 12 MONTHS, YOU WORRIED THAT YOUR FOOD WOULD RUN OUT BEFORE YOU GOT MONEY TO BUY MORE.: NEVER TRUE

## 2024-09-12 SDOH — SOCIAL STABILITY: SOCIAL INSECURITY: DOES ANYONE TRY TO KEEP YOU FROM HAVING/CONTACTING OTHER FRIENDS OR DOING THINGS OUTSIDE YOUR HOME?: NO

## 2024-09-12 SDOH — SOCIAL STABILITY: SOCIAL NETWORK: HOW OFTEN DO YOU GET TOGETHER WITH FRIENDS OR RELATIVES?: MORE THAN THREE TIMES A WEEK

## 2024-09-12 SDOH — ECONOMIC STABILITY: INCOME INSECURITY: IN THE PAST 12 MONTHS, HAS THE ELECTRIC, GAS, OIL, OR WATER COMPANY THREATENED TO SHUT OFF SERVICE IN YOUR HOME?: NO

## 2024-09-12 SDOH — SOCIAL STABILITY: SOCIAL INSECURITY: HAVE YOU HAD ANY THOUGHTS OF HARMING ANYONE ELSE?: NO

## 2024-09-12 ASSESSMENT — COGNITIVE AND FUNCTIONAL STATUS - GENERAL
MOBILITY SCORE: 24
DAILY ACTIVITIY SCORE: 24
PATIENT BASELINE BEDBOUND: NO
DAILY ACTIVITIY SCORE: 24
MOBILITY SCORE: 24

## 2024-09-12 ASSESSMENT — ACTIVITIES OF DAILY LIVING (ADL)
WALKS IN HOME: INDEPENDENT
PATIENT'S MEMORY ADEQUATE TO SAFELY COMPLETE DAILY ACTIVITIES?: YES
FEEDING YOURSELF: INDEPENDENT
BATHING: INDEPENDENT
HEARING - RIGHT EAR: FUNCTIONAL
HEARING - RIGHT EAR: FUNCTIONAL
TOILETING: INDEPENDENT
ADEQUATE_TO_COMPLETE_ADL: YES
TOILETING: INDEPENDENT
DRESSING YOURSELF: INDEPENDENT
HEARING - LEFT EAR: FUNCTIONAL
JUDGMENT_ADEQUATE_SAFELY_COMPLETE_DAILY_ACTIVITIES: YES
DRESSING YOURSELF: INDEPENDENT
WALKS IN HOME: INDEPENDENT
ADEQUATE_TO_COMPLETE_ADL: YES
JUDGMENT_ADEQUATE_SAFELY_COMPLETE_DAILY_ACTIVITIES: YES
GROOMING: INDEPENDENT
GROOMING: INDEPENDENT
PATIENT'S MEMORY ADEQUATE TO SAFELY COMPLETE DAILY ACTIVITIES?: YES
LACK_OF_TRANSPORTATION: NO
BATHING: INDEPENDENT
FEEDING YOURSELF: INDEPENDENT

## 2024-09-12 ASSESSMENT — LIFESTYLE VARIABLES
AUDIT-C TOTAL SCORE: 0
AUDIT-C TOTAL SCORE: 0
SKIP TO QUESTIONS 9-10: 1
HOW MANY STANDARD DRINKS CONTAINING ALCOHOL DO YOU HAVE ON A TYPICAL DAY: PATIENT DOES NOT DRINK
HOW OFTEN DO YOU HAVE A DRINK CONTAINING ALCOHOL: NEVER
PRESCIPTION_ABUSE_PAST_12_MONTHS: NO
HOW OFTEN DO YOU HAVE 6 OR MORE DRINKS ON ONE OCCASION: NEVER
SUBSTANCE_ABUSE_PAST_12_MONTHS: NO

## 2024-09-12 ASSESSMENT — PAIN SCALES - GENERAL
PAINLEVEL_OUTOF10: 0 - NO PAIN
PAINLEVEL_OUTOF10: 0 - NO PAIN

## 2024-09-12 ASSESSMENT — PAIN - FUNCTIONAL ASSESSMENT: PAIN_FUNCTIONAL_ASSESSMENT: NO/DENIES PAIN

## 2024-09-12 NOTE — NURSING NOTE
Report from Sending RN:    Report From: Mohit  Recent Surgery of Procedure: No  Baseline Level of Consciousness (LOC): A and O x 4  Oxygen Use: No  Type: RA  Diabetic: Yes  Last BP Med Given Day of Dialysis: None  Last Pain Med Given: 0430  Lab Tests to be Obtained with Dialysis: Yes  Blood Transfusion to be Given During Dialysis: No  Available IV Access: Yes  Medications to be Administered During Dialysis: No  Continuous IV Infusion Running: No  Restraints on Currently or in the Last 24 Hours: No  Hand-Off Communication: No overnight events, no complaints.  Dialysis Catheter Dressing: AVF  Last Dressing Change: NA

## 2024-09-12 NOTE — PROGRESS NOTES
"Manolo Bashir is a 69 y.o. male on day 2 of admission presenting with Pneumonia of both lungs due to infectious organism, unspecified part of lung.    Subjective   Patient states they are not having any chest pain, headache, nausea/vomiting, diarrhea or back pain currently. Overall feels improved since presentation.       Objective     Physical Exam  Constitutional:       General: He is not in acute distress.  HENT:      Head: Normocephalic and atraumatic.      Mouth/Throat:      Mouth: Mucous membranes are moist.      Pharynx: Oropharynx is clear. No oropharyngeal exudate or posterior oropharyngeal erythema.   Cardiovascular:      Rate and Rhythm: Normal rate and regular rhythm.      Pulses: Normal pulses.      Heart sounds: Normal heart sounds. No murmur heard.     No gallop.   Pulmonary:      Effort: Pulmonary effort is normal. No respiratory distress.      Comments: Rales in the right upper lobe and bilateral lower lobes, but with improvement since yesterday.  Abdominal:      General: Abdomen is flat. Bowel sounds are normal. There is no distension.      Palpations: Abdomen is soft.      Tenderness: There is no abdominal tenderness.   Musculoskeletal:         General: No swelling or tenderness.      Right lower leg: No edema.      Left lower leg: No edema.   Skin:     General: Skin is warm and dry.   Neurological:      General: No focal deficit present.      Mental Status: He is alert and oriented to person, place, and time.         Last Recorded Vitals  Blood pressure 140/68, pulse 80, temperature 36.7 °C (98.1 °F), resp. rate 17, height 1.727 m (5' 8\"), weight 72.6 kg (160 lb), SpO2 94%.  Intake/Output last 3 Shifts:  I/O last 3 completed shifts:  In: 920 (12.7 mL/kg) [I.V.:920 (12.7 mL/kg)]  Out: - (0 mL/kg)   Weight: 72.6 kg     Relevant Results  Results for orders placed or performed during the hospital encounter of 09/10/24 (from the past 24 hour(s))   POCT GLUCOSE   Result Value Ref Range    POCT Glucose " 296 (H) 74 - 99 mg/dL   Hepatic Function Panel   Result Value Ref Range    Albumin 3.1 (L) 3.4 - 5.0 g/dL    Bilirubin, Total 0.5 0.0 - 1.2 mg/dL    Bilirubin, Direct 0.1 0.0 - 0.3 mg/dL    Alkaline Phosphatase 69 33 - 136 U/L    ALT 11 10 - 52 U/L    AST 12 9 - 39 U/L    Total Protein 6.1 (L) 6.4 - 8.2 g/dL   Renal function panel   Result Value Ref Range    Glucose 211 (H) 74 - 99 mg/dL    Sodium 133 (L) 136 - 145 mmol/L    Potassium 4.1 3.5 - 5.3 mmol/L    Chloride 98 98 - 107 mmol/L    Bicarbonate 27 21 - 32 mmol/L    Anion Gap 12 10 - 20 mmol/L    Urea Nitrogen 43 (H) 6 - 23 mg/dL    Creatinine 6.93 (H) 0.50 - 1.30 mg/dL    eGFR 8 (L) >60 mL/min/1.73m*2    Calcium 8.3 (L) 8.6 - 10.6 mg/dL    Phosphorus 5.0 (H) 2.5 - 4.9 mg/dL    Albumin 2.8 (L) 3.4 - 5.0 g/dL   Magnesium   Result Value Ref Range    Magnesium 1.96 1.60 - 2.40 mg/dL   CBC and Auto Differential   Result Value Ref Range    WBC 3.0 (L) 4.4 - 11.3 x10*3/uL    nRBC 0.0 0.0 - 0.0 /100 WBCs    RBC 2.94 (L) 4.50 - 5.90 x10*6/uL    Hemoglobin 8.3 (L) 13.5 - 17.5 g/dL    Hematocrit 25.9 (L) 41.0 - 52.0 %    MCV 88 80 - 100 fL    MCH 28.2 26.0 - 34.0 pg    MCHC 32.0 32.0 - 36.0 g/dL    RDW 17.7 (H) 11.5 - 14.5 %    Platelets 110 (L) 150 - 450 x10*3/uL    Neutrophils % 56.8 40.0 - 80.0 %    Immature Granulocytes %, Automated 0.3 0.0 - 0.9 %    Lymphocytes % 22.4 13.0 - 44.0 %    Monocytes % 13.9 2.0 - 10.0 %    Eosinophils % 5.9 0.0 - 6.0 %    Basophils % 0.7 0.0 - 2.0 %    Neutrophils Absolute 1.72 1.20 - 7.70 x10*3/uL    Immature Granulocytes Absolute, Automated 0.01 0.00 - 0.70 x10*3/uL    Lymphocytes Absolute 0.68 (L) 1.20 - 4.80 x10*3/uL    Monocytes Absolute 0.42 0.10 - 1.00 x10*3/uL    Eosinophils Absolute 0.18 0.00 - 0.70 x10*3/uL    Basophils Absolute 0.02 0.00 - 0.10 x10*3/uL   POCT GLUCOSE   Result Value Ref Range    POCT Glucose 134 (H) 74 - 99 mg/dL     *Note: Due to a large number of results and/or encounters for the requested time period, some  results have not been displayed. A complete set of results can be found in Results Review.     Imaging:  ECG 12 lead  Result Date: 9/10/2024  Poor data quality, interpretation may be adversely affected Normal sinus rhythm Nonspecific intraventricular block Minimal voltage criteria for LVH, may be normal variant ( Levar product ) Abnormal ECG When compared with ECG of 27-AUG-2024 16:45, Nonspecific intraventricular block has replaced Incomplete left bundle branch block See ED provider note for full interpretation and clinical correlation Confirmed by Millicent Sheikh ([id number]) on 9/10/2024 11:09:03 PM    CT abdomen pelvis wo IV contrast  Result Date: 9/10/2024  FINDINGS: Please note that the evaluation of vessels, lymph nodes and organs is limited without intravenous contrast.  LOWER CHEST: There is cardiomegaly. No pericardial effusion.  Bilateral pleural effusions, right larger than left, with adjacent atelectasis.  ABDOMEN:  LIVER: No hepatomegaly.  Smooth surface contour.  Normal attenuation.  BILE DUCTS: No intrahepatic or extrahepatic biliary ductal dilatation.  GALLBLADDER: The gallbladder is absent. STOMACH: No abnormalities identified.  PANCREAS: No masses or ductal dilatation.  SPLEEN: No splenomegaly or focal splenic lesion.  ADRENAL GLANDS: No thickening or nodules.  KIDNEYS AND URETERS: Transplant kidney left lower quadrant without hydronephrosis. Severely atrophic bilateral kidneys. No hydronephrosis. PELVIS:  BLADDER: Slightly thick-walled urinary bladder which is not overly distended. The prostate gland appears absent.  REPRODUCTIVE ORGANS: No abnormalities identified.  BOWEL: Previous small bowel surgeries are seen. Status post right colectomy. Moderate rectal stool suggesting constipation. Rectum measures up to 5 cm in diameter.  VESSELS: No abnormalities identified.  Abdominal aorta is normal in caliber. Diffuse splenic artery atherosclerosis.  PERITONEUM/RETROPERITONEUM/LYMPH NODES: No free fluid.  No  pneumoperitoneum. No lymphadenopathy.  ABDOMINAL WALL: No abnormalities identified. SOFT TISSUES: No abnormalities identified.  BONES: No acute fracture or aggressive osseous lesion. Mild degenerative changes are seen.  1. No acute findings in the abdomen or pelvis. No free air or bowel obstruction. 2. Moderate rectal stool. Correlate for constipation. 3. Pleural effusions with basilar atelectasis. 4. Nonspecific mild bladder wall thickening which is unchanged. Correlate for cystitis. Signed by Gus Wheeler MD    XR chest 2 views  Result Date: 9/10/2024  FINDINGS: PA and lateral views of the chest were obtained.   There is redemonstration of a left-sided axillary vascular graft in similar position at the comparison exam.   CARDIOMEDIASTINAL SILHOUETTE: Cardiomediastinal silhouette is enlarged, but stable in size and configuration.   LUNGS: There is slightly decreased pulmonary interstitial edema compared to the study dated 08/27/2024. There has been interval improvement of the right basilar opacity and associated air bronchogram which was first  appreciated on 08/24/2024.   ABDOMEN: Visualized portions of the abdomen are unremarkable.   BONES: The visualized osseous structures are unremarkable.     1. Slightly improved pulmonary interstitial edema. 2. Interval improvement, of right basilar opacity and associated air bronchograms. Finding is consistent with pneumonia in the proper clinical setting.      US kidney transplant  Result Date: 8/29/2024  FINDINGS: TRANSPLANT KIDNEY: Transplant kidney location:  Left iliac fossa. The transplant kidney pprrusra15.3 cm in craniocaudal dimension. There is mild hydronephrosis. No perinephric fluid collections are seen. Diffusely echogenic appearance of the renal parenchyma.   DOPPLER EVALUATION:   RESISTIVE INDICES: The resistive indices were as follows: 0.72/0.75 in the superior pole, 0.74/0.79 in the midpole, and 0.79/0.73 in the inferior pole. Wave forms are normal.    TRANSPLANT RENAL ARTERY AND VEIN: The main renal artery velocity is 124.8 cm/s.  The arterial anastomosis velocity is 98 cm/s. The adjacent iliac artery velocity is 119 cm/s Transplant renal vein is patent. The peak velocity in the main renal vein is 15.3 cm/s,17.8 cm/s, in the adjacent iliac vein 18 cm/s.  The venous anastomosis velocity is 13 cm/s.     1. Left iliac fossa transplant kidney with mild hydronephrosis and diffusely echogenic parenchyma. Unremarkable vascular evaluation of the transplant kidney. Findings are nonspecific but can be seen with chronic rejection.      Electrocardiogram, 12-lead PRN ACS symptoms  Result Date: 8/28/2024  Normal sinus rhythm Right axis deviation Incomplete left bundle branch block Left ventricular hypertrophy with repolarization abnormality Prolonged QT Abnormal ECG When compared with ECG of 26-AUG-2024 13:22, Incomplete left bundle branch block has replaced Nonspecific intraventricular block Criteria for Lateral infarct are no longer Present Confirmed by Steve Lewis ([id number]) on 8/28/2024 8:40:19 PM    XR chest 1 view  Result Date: 8/28/2024  Interpreted By:  Emile Shah, STUDY: XR CHEST 1 VIEW; 8/27/2024 6:29 am   INDICATION: Signs/Symptoms:pulmonary edema.   COMPARISON: 08/26/2024.   ACCESSION NUMBER(S): [id number]   ORDERING CLINICIAN: JEFFREY CORTEZ   FINDINGS: Left axillary stent placement.   CARDIOMEDIASTINAL SILHOUETTE: Cardiomegaly versus pericardial effusion.   LUNGS: No significant change in perihilar edema.   ABDOMEN: No remarkable upper abdominal findings.   BONES: No acute osseous changes.     1.  There is no significant change in perihilar edema and correlate with cardiac and fluid status.      Electrocardiogram, 12-lead PRN ACS symptoms  Result Date: 8/27/2024  Sinus rhythm with frequent Premature ventricular complexes Nonspecific intraventricular block Cannot rule out Anterior infarct , age undetermined Abnormal ECG When compared with ECG of  24-AUG-2024 12:11, Premature ventricular complexes are now Present QRS axis Shifted left Nonspecific T wave abnormality no longer evident in Lateral leads Confirmed by Muna Murillo ([id number]) on 8/27/2024 5:56:33 PM    Electrocardiogram, 12-lead PRN ACS symptoms  Result Date: 8/27/2024  Suspect arm lead reversal, interpretation assumes no reversal Normal sinus rhythm Nonspecific intraventricular block Abnormal ECG When compared with ECG of 8/25/24 Premature ventricular complexes are no longer present Confirmed by Steve Lewis ([id number]) on 8/27/2024 5:01:11 PM    US thoracentesis  Result Date: 8/26/2024  Interpreted By:  Suly Hodgson, STUDY: US THORACENTESIS;  8/26/2024 10:15 am   INDICATION: Signs/Symptoms:thoracentessis.   COMPARISON: None.   ACCESSION NUMBER(S): [id number]   ORDERING CLINICIAN: JEFFREY CORTEZ   TECHNIQUE: INTERVENTIONALIST(S): Suly Hodgson PA-C   CONSENT: The patient/patient's POA/next of kin was informed of the nature of the proposed procedure. The purposes, alternatives, risks, and benefits were explained and discussed. All questions were answered and consent was obtained.   SEDATION: None   MEDICATION/CONTRAST: No additional   TIME OUT: A time out was performed immediately prior to procedure start with the interventional team, correctly identifying the patient name, date of birth, MRN, procedure, anatomy (including marking of site and side), patient position, procedure consent form, relevant laboratory and imaging test results, antibiotic administration, safety precautions, and procedure-specific equipment needs.   FINDINGS: The patient was placed in the sitting position.   The pleural space was examined with grey scale ultrasound, and the most accessible fluid identified and marked for thoracentesis.   The skin was prepped and draped in usual manner. Local anesthesia with Lidocaine was administered and a  right-sided thoracentesis was performed.  A 5 Nepali One-Step thoracentesis  needle/catheter was then placed where marked.  Approximately 850 mL of serosanguinous colored fluid was removed.  The needle/catheter was then withdrawn.   The patient tolerated the procedure well and there were no immediate complications. Specimen(s) sent to the laboratory and pathology for further evaluation, per the requesting team.       Uneventful  right-sided thoracentesis, as detailed above.   I personally performed and/or directly supervised this study and was present for the entire procedure.   I personally reviewed the study and resident interpretation. I agree with the findings as stated.   Performed and dictated at [facility].   MACRO: None   Signed by: Suly Hodgson 8/26/2024 5:15 PM Dictation workstation:   [id number]    XR chest 1 view  Result Date: 8/26/2024  FINDINGS:     Left subclavian stent, ? Appears kinked   CARDIOMEDIASTINAL SILHOUETTE: Cardiomediastinal silhouette is enlarged and stable in size and configuration.   LUNGS: Mild diffuse increased interstitial markings. Costophrenic angles are clear. No evidence of a pneumothorax   ABDOMEN: No remarkable upper abdominal findings.   BONES: No acute osseous changes.     1.  Mild interstitial edema. No evidence of a pneumothorax or pleural effusion.   2. Left subclavian stent, ? Appears kinked   MACRO: None      ECG 12 lead  Result Date: 8/24/2024  Normal sinus rhythm Nonspecific intraventricular block Minimal voltage criteria for LVH, may be normal variant ( Levar product ) Abnormal ECG When compared with ECG of 26-JUN-2024 08:26, No significant change was found See ED provider note for full interpretation and clinical correlation Confirmed by Faustina Workman ([id number]) on 8/24/2024 9:19:13 PM    CT abdomen pelvis wo IV contrast  Result Date: 8/24/2024  FINDINGS: Please note that the evaluation of vessels, lymph nodes and organs is limited without intravenous contrast.   LOWER CHEST: Moderate right pleural  effusion. Small left pleural effusion. Mild adjacent lung atelectasis is noted. A few ground-glass opacities noted in the bilateral lower lobes which can be seen in the setting of pulmonary edema.. The heart is enlarged.  No evidence of pericardial effusion.   ABDOMEN:   LIVER: The liver is normal in size without evidence of focal lesions.   BILE DUCTS: The intrahepatic and extrahepatic ducts are not dilated.   GALLBLADDER: The gallbladder is not definitively visualized and may be decompressed versus surgically absent.   PANCREAS: The pancreas appears unremarkable without evidence of ductal dilatation or masses.   SPLEEN: The spleen is normal in size with no evidence of focal lesions.   ADRENAL GLANDS: Bilateral adrenal glands appear normal.   KIDNEYS AND URETERS: The bilateral kidneys are atrophic. Left iliac fossa renal transplant without associated perinephric fluid collection or hydroureteronephrosis..   PELVIS:   BLADDER: The urinary bladder is decompressed, limited for evaluation. There is mild bladder wall thickening, similar to prior, and suspected to be secondary to bladder decompression.   REPRODUCTIVE ORGANS: The prostate is not enlarged.   BOWEL: The stomach is unremarkable. The small bowel is normal in caliber without evidence of wall thickening throughout. Status post partial colectomy. The large bowel is otherwise normal in caliber and demonstrates no abnormal wall thickening. The appendix is not definitely visualized. There is however no pericecal stranding or fluid.   VESSELS: Diffuse vascular calcifications throughout the abdomen. The aorta and IVC otherwise unremarkable.   PERITONEUM/RETROPERITONEUM/LYMPH NODES: There is no free or loculated fluid collection, no free intraperitoneal air. The retroperitoneum appears normal.  No abdominopelvic lymphadenopathy is present.   ABDOMINAL WALL: The abdominal wall soft tissues appear normal.   BONES: No suspicious osseous lesions are identified.  Degenerative discogenic disease is noted in the lower thoracic and lumbar spine. Mild compression deformity of the T10 vertebral body is likely degenerative.     1.  Bilateral pleural effusions right-greater-than-left with the ground-glass opacities in the bilateral lower lobes. Findings can be seen with pulmonary edema.. 2. No acute pathology within the abdomen or pelvis to explain right-sided abdominal pain.     XR chest 1 view  Result Date: 8/24/2024  FINDINGS: Sizable area of right basilar consolidation consistent with pneumonia.   There is increased generalized prominence of the perihilar and basilar interstitium and a superimposed component of edema suggested     Sizable area of right basilar consolidation consistent with pneumonia.   There is increased generalized prominence of the perihilar and basilar interstitium and a superimposed component of edema suggested                                   Assessment/Plan   Assessment & Plan  Pneumonia of both lungs due to infectious organism, unspecified part of lung    Manolo Bashir is a 69 y.o. male with PMHx ESRD on iHD (TTS) s/p 2 time renal transplant (1992, 2013), now with impaired allograft function currently on immunosuppression (prednisone, tacrolimus), history of IgG and IgA lambda MGUS (recent hematologic eval including Bmbx with no e/o myeloma), DM2, HTN, prostate cancer s/p radical prostatectomy, baseline urinary incontinence, HCV s/p rx (PCR neg 10/2023) presenting to the ED with fever, dry cough, nausea,  vomiting, and HTN with SBP to 200. He is admitted for treatment of potential PNA, and HTN control.    #Hypertensive urgency  :: BNP >1k on presentation  :: CT with pleural effusions  Plan:  - c/w home Hydralazine 50 mg TID, carvedilol 37.5 mg, BID  - prn hydralazine 25mg po QID for SBP >180  - c/w home Nifedipine 60 mg daily  - c/w home imdur 60mg daily  - c/w home Bumex 2 mg po on non-HD days (sun, MWF)  - consider IV diuresis inpatient     #HLD  -  c/w home asa and pravastatin     #c/f HAP  #Positive SIRs   #Bilateral pleural effusions   :: CXR which demonstrated right basilar consolidation, improved from previous  :: CT AP wo contrast only demonstrated bilateral plural effusion and moderate stool burden  :: No leukocytosis  :: Started on vanc, zosyn and azithromycin in the ED   :: MRSA, covid, flu negative  :: UA bland  :: procal downtrended to 0.3 from prior admission (0.86)  :: Prior thoracentesis on last admission 8/26 with right sided transudative effusion   Plan:  - c/w zosyn and azithromycin for HAP  - Follow up RVP, sputum culture  - follow Bcx  - monitor fever curve  - IR thoracentesis w/ appropriate studies if drainable fluid collection      #ESRD   :: on iHD (TTS) s/p 2 time renal transplant (1992, 2013)  :: now with impaired allograft function currently on immunosuppression (prednisone, tacrolimus)  :: last admit  transplant nephrology recommended hold tacrolimus 1 mg BID and continue with prednisone 5 mg daily  - Consult general nephrology for iHD (TTS)   - Plans to receive UF today 9/11  - consulted transplant nephrology, recommended following with general nephrology as above and outpatient follow up  - c/w home prednisone 5mg daily  - continue tacrolimus 0.5 mg BID per transplant nephrology from last admission   - c/w home renal vitamins     #Hx Gastroparesis   #Nausea and vomiting   :: S/p GES on April 2024 revealing gastroparesis.   :: was seen at the GI clinic in 6/2024 recommended c/w Reglan dosing of 5mg before dinner and at bedtime and Pantoprazole 40 mg daily  ::treated with erythromycin last admit  Plan:  - c/w home PPI   - c/w metoclopramide scheduled      #DM2  :: Last A1c 6.6 (7/10/2024)  :: Home insulin (Glargine 20 units at bed time and SSI)  - started glargine 10 units nightly as patient wit N/V  - SSI #1     #Anemia  #Chronic thrombocytopenia   :: Hb 9.2 BL 7.2-8.7  :: last admit: Iron 13, TIBC 143 (L), sat 9 (L), Ferritin 985  (H)  :: Most likely related to ESRD  Plan:  - continue to monitor      F: PRN  E: Replete lytes PRN, K>4, Mg >2  Diet: Renal diet  Access: PIV, LUE AVF  Bowel regimen: miralax     DVT prophylaxis: Heparin sq/SCDs  GI prophylaxis: PPI     Code status: Full code (Discussed with patient at bedside upon admission)   NOK: Amalia Enriquez - Friend ([phone])           Kei Luther DO  PGY-1 Internal Medicine

## 2024-09-12 NOTE — CARE PLAN
Problem: Fall/Injury  Goal: Not fall by end of shift  Outcome: Progressing  Goal: Be free from injury by end of the shift  Outcome: Progressing  Goal: Verbalize understanding of personal risk factors for fall in the hospital  Outcome: Progressing  Goal: Verbalize understanding of risk factor reduction measures to prevent injury from fall in the home  Outcome: Progressing  Goal: Use assistive devices by end of the shift  Outcome: Progressing  Goal: Pace activities to prevent fatigue by end of the shift  Outcome: Progressing   The patient's goals for the shift include To feel better    The clinical goals for the shift include Patient will remain safe the whole shift

## 2024-09-12 NOTE — PROGRESS NOTES
Renal Staff HD Note    Patient seen, examined on dialysis   Tolerating treatment without event  119/62  L AVF even 65.1 (67) 2K    BP Readings from Last 3 Encounters:   09/12/24 128/61   09/04/24 120/60   08/31/24 139/64     [unfilled]  Lab Results   Component Value Date    CREATININE 6.93 (H) 09/12/2024    BUN 43 (H) 09/12/2024     (L) 09/12/2024    K 4.1 09/12/2024    CL 98 09/12/2024    CO2 27 09/12/2024     Lab Results   Component Value Date    .6 (H) 05/01/2024    CALCIUM 8.3 (L) 09/12/2024    PHOS 5.0 (H) 09/12/2024     @  Lab Results   Component Value Date    HGB 8.3 (L) 09/12/2024       Continue treatment per submitted orders

## 2024-09-12 NOTE — PROGRESS NOTES
09/12/24 1224   Discharge Planning   Living Arrangements Children   Support Systems Children   Assistance Needed independent with ADL's   Type of Residence Private residence   Home or Post Acute Services In home services   Type of Home Care Services Home nursing visits;Home OT;Home PT   Expected Discharge Disposition  Services   Does the patient need discharge transport arranged? No   Financial Resource Strain   How hard is it for you to pay for the very basics like food, housing, medical care, and heating? Not very   Housing Stability   In the last 12 months, was there a time when you were not able to pay the mortgage or rent on time? N   In the past 12 months, how many times have you moved where you were living? 0   At any time in the past 12 months, were you homeless or living in a shelter (including now)? N   Transportation Needs   In the past 12 months, has lack of transportation kept you from medical appointments or from getting medications? no   In the past 12 months, has lack of transportation kept you from meetings, work, or from getting things needed for daily living? No       Plan per Medical/Surgical team: Patient is admitted for pneumonia in both lungs and hypertensive urgency. Transplant Nephrology consulted.    Assessment Note: Patient lives with his daughter. He is up and independent. Was previously signed up with  Home Care Services. Denies any financial and social work needs. Family will provide transportation at discharge.    Home Care: [facility] [place] for SN/PT/OT  PCP: Elma Hutton CNP  Pharmacy: [facility]  DME: shower chair  Falls: denies  Dialysis: [facility]    Potential Barriers: none  Discharge Disposition: home with home care  ADOD: 1-2 days

## 2024-09-12 NOTE — POST-PROCEDURE NOTE
INTERVENTIONAL RADIOLOGY ADVANCED PRACTICE PROCEDURE  [facility]    A time out was performed and Right Hemithorax was examined with US and appropriate entry point was confirmed and marked.   The patient was prepped and draped in a sterile manner, 1% lidocaine was used to anesthesize the skin and subcutaneous tissue.   A 5F Centesis needle was then introduced through the skin into the pleural space, the centesis catheter was then threaded without difficulty.   750 ml of serosanguineous fluid was removed without difficulty. The catheter was then removed.   No immediate complications were noted during and immediately following the procedure.

## 2024-09-12 NOTE — PROGRESS NOTES
"Pharmacy Medication History Review    Manolo Bashir is a 69 y.o. male admitted for Pneumonia of both lungs due to infectious organism, unspecified part of lung. Pharmacy reviewed the patient's [id number] medications and allergies for accuracy.    Medications ADDED:  None   Medications CHANGED:  Bumex 2 mg tablet - updated to \" Patient not taking , requesting refill.\"   Epoetin Domenico 10,000 unit/mL injections - updated to \" Patient not taking.\"  Insulin Glargine ( Lantus ) 100 unit/mL pen - updated to \" Patient taking differently : 24 units once everyday.\"   Vancomycin 125 mg capsule - updated to \" Therapy completed , Patient not taking.\"  Medications REMOVED:   None    The list below reflects the updated PTA list.   Prior to Admission Medications   Prescriptions Last Dose Informant   B complex-vitamin C-folic acid (Nephrocaps) 1 mg capsule 9/10/2024 Self, Family Member   Sig: Take 1 capsule by mouth once daily.   Easy Touch Alcohol Prep Pads pads, medicated  Self, Family Member   NIFEdipine ER (Adalat CC) 60 mg 24 hr tablet 9/10/2024 Self, Family Member   Sig: Take 1 tablet (60 mg) by mouth 2 times a day. Do not crush, chew, or split.   Unilet Super Thin Lancets 30 gauge misc  Self, Family Member   Si each 3 times a day.   acetaminophen (Tylenol) 325 mg tablet 2024 Self, Family Member   Sig: Take 3 tablets (975 mg) by mouth every 6 hours if needed (pain).   aspirin 81 mg chewable tablet 2024 Self, Family Member   Sig: Chew 1 tablet (81 mg) once daily.   bumetanide (Bumex) 2 mg tablet Not Taking    Sig: Take 1 tablet (2 mg) by mouth 4 times a week. Take bumetanide 2mg once daily on non-dialysis days (, Monday, Wednesday, Friday)   Patient not taking: Reported on 2024   calcitriol (Rocaltrol) 0.5 mcg capsule 9/10/2024 Self, Family Member   Sig: Take 1 capsule (0.5 mcg) by mouth once daily.   carvedilol (Coreg) 12.5 mg tablet 9/10/2024 Self, Family Member   Sig: TAKE THREE (3) TABLETS " "BY MOUTH TWICE DAILY   cinacalcet (Sensipar) 30 mg tablet 9/10/2024 Self, Family Member   Sig: Take 1 tablet (30 mg) by mouth once daily.   epoetin aida 10,000 unit/mL injection  Self, Family Member   Sig: Inject 1 mL (10,000 Units) under the skin every 7 days. Do not start before April 17, 2024.   Patient not taking: Reported on 8/24/2024   glucagon (Gvoke HypoPen 1-Pack) 1 mg/0.2 mL auto-injector Unknown Self, Family Member   Sig: Inject 1 mg into the muscle every 15 minutes if needed (For blood glucose 41 to 70 mg/dL and no IV access).   hydrALAZINE (Apresoline) 50 mg tablet 9/10/2024 Self, Family Member   Sig: Take 1 tablet (50 mg) by mouth 3 times a day.   insulin glargine (Lantus) 100 unit/mL (3 mL) pen 9/10/2024 Self, Family Member   Sig: Inject 20 units daily as directed   Patient taking differently: Inject 24 Units under the skin once daily. Inject 20 units daily as directed   insulin lispro (HumaLOG) 100 unit/mL injection 9/10/2024 Self, Family Member   Sig: Inject under the skin 3 times a day with meals. 100-199 2 units 200-299 4 units 300-399 6 units   isosorbide mononitrate ER (Imdur) 60 mg 24 hr tablet 9/10/2024 Self, Family Member   Sig: Take 1 tablet (60 mg) by mouth once daily.   lancets (Unilet Super Thin Lancets) 30 gauge misc  Self, Family Member   Sig: USE TO TEST BLOOD SUGAR THREE TIMES A DAY   metoclopramide (Reglan) 5 mg tablet 9/10/2024 Self, Family Member   Sig: Take 1 tablet (5 mg) by mouth 2 times a day. 1 tablet before dinner and 1 tablet at bedtime.   pantoprazole (ProtoNix) 40 mg EC tablet 9/10/2024 Self, Family Member   Sig: Take 1 tablet (40 mg) by mouth once daily in the morning. Take before meals. Do not crush, chew, or split. Do not start before January 22, 2024.   pen needle, diabetic (TechLITE Pen Needle) 32 gauge x 5/32\" needle  Self, Family Member   Sig: Use 4 a day as directed   pravastatin (Pravachol) 10 mg tablet 9/10/2024 Self, Family Member   Sig: Take 1 tablet (10 mg) by " "mouth once daily at bedtime.   predniSONE (Deltasone) 10 mg tablet 9/10/2024 Self, Family Member   Sig: Take 1 tablet (10 mg) by mouth once daily in the morning for 8 days, THEN 0.5 tablets (5 mg) once daily in the morning.   syringe with needle 3 mL 25 x 5/8\" syringe  Self, Family Member   Sig: Use to inject epogen.   tacrolimus (Prograf) 0.5 mg capsule 9/10/2024 Self, Family Member   Sig: Take 1 capsule (0.5 mg) by mouth every 12 hours.   tacrolimus (Protopic) 0.1 % ointment 9/10/2024 Self, Family Member   Sig: Apply topically 2 times a day.   tacrolimus (Protopic) 0.1 % ointment  Self, Family Member   Sig: Apply topically 2 times a day.   vancomycin (Vancocin) 125 mg capsule Not Taking    Sig: Take 1 capsule (125 mg) by mouth 4 times a day for 5 days.   Patient not taking: Reported on 9/11/2024      Facility-Administered Medications: None        The list below reflects the updated allergy list. Please review each documented allergy for additional clarification and justification.  Allergies  Reviewed by Dianne Carter on 9/11/2024   No Known Allergies         Patient accepts M2B at discharge.     Sources:   Patient Interview  Patient dispense [facility] - 03/21/2024 Oxycodone Hcl ( ir ) 5 mg tablet 10 # / 5 days   08/31/2024 Discharge Summary w / Kei Luther DO - Resident.    Chart Review     Additional Comments:  Patient states he has completed therapy for Vancomycin 125 mg capsules and is no longer taking this medication. This medication was dispensed on 08/31/2024 for a 5 days therapy.     Patient states he is no longer taking Epoetin Domenico 10,000 unit/mL injections.     Patient states he is out of supply for Bumetanide 2 mg tablets , patient is requesting a refill for this medication.     Patient reports taking Lantus 100 unit/mL pen injections differently than prescribed. Patient is currently injecting 24 units once daily. Sig per discharge summary on 08/31/2024 states for patient to inject 20 units " "subcutaneous once daily.     Patient reports he is now on the \" 0.5 tabs once daily in the morning\" on tapering scale for Prednisone 10 mg tablets.     Patient is a reliable historian and appears compliant with home medications.     Dianne Carter  Pharmacy Technician  09/11/24     Secure Chat preferred   If no response call [phone] or [facility] \"Med Rec\"   "

## 2024-09-12 NOTE — NURSING NOTE
Report to Receiving RN:    Report To: AYO Stahl  Time Report Called: [id number]  Hand-Off Communication: Pt completed 3 hrs 45 mins HD tx, 0 UF removed, A/Ox4, /66, HR 77, no issues, stable post HD  Complications During Treatment: No  Ultrafiltration Treatment: Yes  Medications Administered During Dialysis: No  Blood Products Administered During Dialysis: No  Labs Sent During Dialysis: Yes      Last Updated: 12:44 PM by SUJIT WAYNE

## 2024-09-13 ENCOUNTER — HOME CARE VISIT (OUTPATIENT)
Dept: HOME HEALTH SERVICES | Facility: HOME HEALTH | Age: 69
End: 2024-09-13
Payer: COMMERCIAL

## 2024-09-13 ENCOUNTER — PHARMACY VISIT (OUTPATIENT)
Dept: PHARMACY | Facility: CLINIC | Age: 69
End: 2024-09-13
Payer: MEDICAID

## 2024-09-13 VITALS
WEIGHT: 160 LBS | SYSTOLIC BLOOD PRESSURE: 140 MMHG | OXYGEN SATURATION: 95 % | BODY MASS INDEX: 24.25 KG/M2 | DIASTOLIC BLOOD PRESSURE: 63 MMHG | RESPIRATION RATE: 16 BRPM | TEMPERATURE: 97.9 F | HEIGHT: 68 IN | HEART RATE: 73 BPM

## 2024-09-13 LAB
ALBUMIN SERPL BCP-MCNC: 3.1 G/DL (ref 3.4–5)
ALP SERPL-CCNC: 68 U/L (ref 33–136)
ALT SERPL W P-5'-P-CCNC: 12 U/L (ref 10–52)
ANION GAP SERPL CALC-SCNC: 11 MMOL/L (ref 10–20)
AST SERPL W P-5'-P-CCNC: 13 U/L (ref 9–39)
BASOPHILS # BLD AUTO: 0.02 X10*3/UL (ref 0–0.1)
BASOPHILS NFR BLD AUTO: 0.7 %
BASOPHILS NFR FLD MANUAL: 2 %
BILIRUB DIRECT SERPL-MCNC: 0.1 MG/DL (ref 0–0.3)
BILIRUB SERPL-MCNC: 0.4 MG/DL (ref 0–1.2)
BLASTS NFR FLD MANUAL: 0 %
BUN SERPL-MCNC: 23 MG/DL (ref 6–23)
CALCIUM SERPL-MCNC: 8.4 MG/DL (ref 8.6–10.6)
CHLORIDE SERPL-SCNC: 100 MMOL/L (ref 98–107)
CLARITY FLD: ABNORMAL
CO2 SERPL-SCNC: 30 MMOL/L (ref 21–32)
COLOR FLD: ABNORMAL
CREAT SERPL-MCNC: 4.72 MG/DL (ref 0.5–1.3)
EGFRCR SERPLBLD CKD-EPI 2021: 13 ML/MIN/1.73M*2
EOSINOPHIL # BLD AUTO: 0.08 X10*3/UL (ref 0–0.7)
EOSINOPHIL NFR BLD AUTO: 2.6 %
EOSINOPHIL NFR FLD MANUAL: 0 %
ERYTHROCYTE [DISTWIDTH] IN BLOOD BY AUTOMATED COUNT: 17.4 % (ref 11.5–14.5)
GLUCOSE BLD MANUAL STRIP-MCNC: 262 MG/DL (ref 74–99)
GLUCOSE BLD MANUAL STRIP-MCNC: 311 MG/DL (ref 74–99)
GLUCOSE FLD-MCNC: 159 MG/DL
GLUCOSE SERPL-MCNC: 235 MG/DL (ref 74–99)
HCT VFR BLD AUTO: 26.5 % (ref 41–52)
HGB BLD-MCNC: 8.5 G/DL (ref 13.5–17.5)
IMM GRANULOCYTES # BLD AUTO: 0.02 X10*3/UL (ref 0–0.7)
IMM GRANULOCYTES NFR BLD AUTO: 0.7 % (ref 0–0.9)
IMMATURE GRANULOCYTES IN FLUID: 0 %
LDH FLD L TO P-CCNC: 84 U/L
LDH SERPL L TO P-CCNC: 172 U/L (ref 84–246)
LYMPHOCYTES # BLD AUTO: 0.58 X10*3/UL (ref 1.2–4.8)
LYMPHOCYTES NFR BLD AUTO: 19 %
LYMPHOCYTES NFR FLD MANUAL: 12 %
MAGNESIUM SERPL-MCNC: 2.05 MG/DL (ref 1.6–2.4)
MCH RBC QN AUTO: 28.1 PG (ref 26–34)
MCHC RBC AUTO-ENTMCNC: 32.1 G/DL (ref 32–36)
MCV RBC AUTO: 88 FL (ref 80–100)
MONOCYTES # BLD AUTO: 0.32 X10*3/UL (ref 0.1–1)
MONOCYTES NFR BLD AUTO: 10.5 %
MONOS+MACROS NFR FLD MANUAL: 39 %
NEUTROPHILS # BLD AUTO: 2.04 X10*3/UL (ref 1.2–7.7)
NEUTROPHILS NFR BLD AUTO: 66.5 %
NEUTROPHILS NFR FLD MANUAL: 47 %
NRBC BLD-RTO: 0 /100 WBCS (ref 0–0)
OTHER CELLS NFR FLD MANUAL: 0 %
PH FLD: 6.95 [PH]
PHOSPHATE SERPL-MCNC: 3.3 MG/DL (ref 2.5–4.9)
PLASMA CELLS NFR FLD MANUAL: 0 %
PLATELET # BLD AUTO: 77 X10*3/UL (ref 150–450)
POTASSIUM SERPL-SCNC: 4.2 MMOL/L (ref 3.5–5.3)
PROT FLD-MCNC: 2.3 G/DL
PROT SERPL-MCNC: 6.3 G/DL (ref 6.4–8.2)
PROT SERPL-MCNC: 6.3 G/DL (ref 6.4–8.2)
RBC # BLD AUTO: 3.02 X10*6/UL (ref 4.5–5.9)
RBC # FLD AUTO: ABNORMAL /UL
SODIUM SERPL-SCNC: 137 MMOL/L (ref 136–145)
TOTAL CELLS COUNTED FLD: 100
WBC # BLD AUTO: 3.1 X10*3/UL (ref 4.4–11.3)
WBC # FLD AUTO: 1042 /UL

## 2024-09-13 PROCEDURE — 84100 ASSAY OF PHOSPHORUS: CPT

## 2024-09-13 PROCEDURE — 2500000001 HC RX 250 WO HCPCS SELF ADMINISTERED DRUGS (ALT 637 FOR MEDICARE OP)

## 2024-09-13 PROCEDURE — 99239 HOSP IP/OBS DSCHRG MGMT >30: CPT

## 2024-09-13 PROCEDURE — 85025 COMPLETE CBC W/AUTO DIFF WBC: CPT

## 2024-09-13 PROCEDURE — 83615 LACTATE (LD) (LDH) ENZYME: CPT

## 2024-09-13 PROCEDURE — 82248 BILIRUBIN DIRECT: CPT

## 2024-09-13 PROCEDURE — 36415 COLL VENOUS BLD VENIPUNCTURE: CPT

## 2024-09-13 PROCEDURE — RXMED WILLOW AMBULATORY MEDICATION CHARGE

## 2024-09-13 PROCEDURE — 2500000004 HC RX 250 GENERAL PHARMACY W/ HCPCS (ALT 636 FOR OP/ED)

## 2024-09-13 PROCEDURE — 99233 SBSQ HOSP IP/OBS HIGH 50: CPT | Performed by: NURSE PRACTITIONER

## 2024-09-13 PROCEDURE — 82947 ASSAY GLUCOSE BLOOD QUANT: CPT

## 2024-09-13 PROCEDURE — 84155 ASSAY OF PROTEIN SERUM: CPT

## 2024-09-13 PROCEDURE — 83735 ASSAY OF MAGNESIUM: CPT

## 2024-09-13 PROCEDURE — 2500000002 HC RX 250 W HCPCS SELF ADMINISTERED DRUGS (ALT 637 FOR MEDICARE OP, ALT 636 FOR OP/ED)

## 2024-09-13 RX ORDER — INSULIN GLARGINE 100 [IU]/ML
12 INJECTION, SOLUTION SUBCUTANEOUS NIGHTLY
Status: DISCONTINUED | OUTPATIENT
Start: 2024-09-13 | End: 2024-09-13 | Stop reason: HOSPADM

## 2024-09-13 RX ORDER — CARVEDILOL 12.5 MG/1
37.5 TABLET ORAL 2 TIMES DAILY
Qty: 540 TABLET | Refills: 3 | Status: SHIPPED | OUTPATIENT
Start: 2024-09-13

## 2024-09-13 ASSESSMENT — COGNITIVE AND FUNCTIONAL STATUS - GENERAL
DAILY ACTIVITIY SCORE: 24
CLIMB 3 TO 5 STEPS WITH RAILING: A LITTLE
MOBILITY SCORE: 23

## 2024-09-13 ASSESSMENT — PAIN SCALES - GENERAL
PAINLEVEL_OUTOF10: 0 - NO PAIN
PAINLEVEL_OUTOF10: 0 - NO PAIN

## 2024-09-13 NOTE — PROGRESS NOTES
Manolo Bashir is a 69 y.o. male on day 3 of admission presenting with Pneumonia of both lungs due to infectious organism, unspecified part of lung.    MARRY called [facility] [phone]) to inform them pt is dc and expected to return to them tomorrow. [facility] asked for dc papers and clinicals, which MARRY will send in a fax to [phone].    MARRY will follow up if needed to.    Ekta Olivo MSW W  Care Transitions  2  [phone] or Epic Secure Chat

## 2024-09-13 NOTE — CARE PLAN
The patient's goals for the shift include To feel better    The clinical goals for the shift include pt will remain HDS    Over the shift, the patient did not make progress toward the following goals. Barriers to progression include Recommendations to address these barriers include .

## 2024-09-13 NOTE — CARE PLAN
The patient's goals for the shift include To feel better      Problem: Fall/Injury  Goal: Not fall by end of shift  Outcome: Progressing  Goal: Be free from injury by end of the shift  Outcome: Progressing  Goal: Verbalize understanding of personal risk factors for fall in the hospital  Outcome: Progressing  Goal: Verbalize understanding of risk factor reduction measures to prevent injury from fall in the home  Outcome: Progressing  Goal: Use assistive devices by end of the shift  Outcome: Progressing  Goal: Pace activities to prevent fatigue by end of the shift  Outcome: Progressing

## 2024-09-13 NOTE — PROGRESS NOTES
"Manolo Bashir is a 69 y.o. male on day 3 of admission presenting with Pneumonia of both lungs due to infectious organism, unspecified part of lung.    Subjective   Pt resting in bed. Denies sob, n/v/d, fever, cough, chills, pain, chest pain, light head, dizziness, constipation       Objective     Physical Exam  Vitals and nursing note reviewed.   Cardiovascular:      Comments: 9/12-rt hemithorax with 750ml return  SR 68  Pulmonary:      Comments: Jeffrey lung sounds dim  Abdominal:      General: There is distension.      Comments: Non-tender to palpation   Genitourinary:     Comments: States make little urine  Musculoskeletal:      Comments: Ble without edema   Skin:     General: Skin is warm and dry.   Neurological:      Mental Status: He is alert and oriented to person, place, and time.   Psychiatric:         Mood and Affect: Mood normal.         Behavior: Behavior normal.         Last Recorded Vitals  Blood pressure 136/63, pulse 80, temperature 36.3 °C (97.3 °F), temperature source Tympanic, resp. rate 16, height 1.727 m (5' 8\"), weight 72.6 kg (160 lb), SpO2 98%.  Intake/Output last 3 Shifts:  I/O last 3 completed shifts:  In: 1550 (21.4 mL/kg) [I.V.:700 (9.6 mL/kg); Other:800; IV Piggyback:50]  Out: 800 (11 mL/kg) [Other:800]  Weight: 72.6 kg     Relevant Results  Scheduled medications  acetaminophen, 975 mg, oral, q8h  aspirin, 81 mg, oral, Daily  B complex-vitamin C-folic acid, 1 capsule, oral, Daily  bumetanide, 2 mg, oral, Once per day on Sunday Monday Wednesday Friday  calcitriol, 0.5 mcg, oral, Daily  carvedilol, 37.5 mg, oral, BID  cinacalcet, 30 mg, oral, Daily  [START ON 9/14/2024] epoetin aida or biosimilar, 8,000 Units, intravenous, Once per day on Tuesday Thursday Saturday  heparin (porcine), 5,000 Units, subcutaneous, q8h  hydrALAZINE, 50 mg, oral, TID  insulin glargine, 12 Units, subcutaneous, Nightly  insulin lispro, 0-5 Units, subcutaneous, TID  isosorbide mononitrate ER, 60 mg, oral, " Daily  lidocaine, 1 patch, transdermal, Daily  metoclopramide, 5 mg, oral, BID   Or  metoclopramide, 5 mg, intravenous, BID  NIFEdipine ER, 60 mg, oral, BID  pantoprazole, 40 mg, oral, Daily before breakfast  paricalcitol, 2 mcg, intravenous, Once per day on Tuesday Thursday Saturday  piperacillin-tazobactam, 2.25 g, intravenous, q8h  polyethylene glycol, 17 g, oral, Daily  pravastatin, 10 mg, oral, Nightly  predniSONE, 5 mg, oral, q AM  tacrolimus, 0.5 mg, oral, q12h ZOË      Continuous medications     PRN medications  PRN medications: dextrose, dextrose, glucagon, glucagon, hydrALAZINE, HYDROmorphone   Results for orders placed or performed during the hospital encounter of 09/10/24 (from the past 24 hour(s))   POCT GLUCOSE   Result Value Ref Range    POCT Glucose 281 (H) 74 - 99 mg/dL   Hepatic Function Panel   Result Value Ref Range    Albumin 3.1 (L) 3.4 - 5.0 g/dL    Bilirubin, Total 0.4 0.0 - 1.2 mg/dL    Bilirubin, Direct 0.1 0.0 - 0.3 mg/dL    Alkaline Phosphatase 68 33 - 136 U/L    ALT 12 10 - 52 U/L    AST 13 9 - 39 U/L    Total Protein 6.3 (L) 6.4 - 8.2 g/dL   Magnesium   Result Value Ref Range    Magnesium 2.05 1.60 - 2.40 mg/dL   CBC and Auto Differential   Result Value Ref Range    WBC 3.1 (L) 4.4 - 11.3 x10*3/uL    nRBC 0.0 0.0 - 0.0 /100 WBCs    RBC 3.02 (L) 4.50 - 5.90 x10*6/uL    Hemoglobin 8.5 (L) 13.5 - 17.5 g/dL    Hematocrit 26.5 (L) 41.0 - 52.0 %    MCV 88 80 - 100 fL    MCH 28.1 26.0 - 34.0 pg    MCHC 32.1 32.0 - 36.0 g/dL    RDW 17.4 (H) 11.5 - 14.5 %    Platelets 77 (L) 150 - 450 x10*3/uL    Neutrophils % 66.5 40.0 - 80.0 %    Immature Granulocytes %, Automated 0.7 0.0 - 0.9 %    Lymphocytes % 19.0 13.0 - 44.0 %    Monocytes % 10.5 2.0 - 10.0 %    Eosinophils % 2.6 0.0 - 6.0 %    Basophils % 0.7 0.0 - 2.0 %    Neutrophils Absolute 2.04 1.20 - 7.70 x10*3/uL    Immature Granulocytes Absolute, Automated 0.02 0.00 - 0.70 x10*3/uL    Lymphocytes Absolute 0.58 (L) 1.20 - 4.80 x10*3/uL     Monocytes Absolute 0.32 0.10 - 1.00 x10*3/uL    Eosinophils Absolute 0.08 0.00 - 0.70 x10*3/uL    Basophils Absolute 0.02 0.00 - 0.10 x10*3/uL   Phosphorus   Result Value Ref Range    Phosphorus 3.3 2.5 - 4.9 mg/dL   Basic Metabolic Panel   Result Value Ref Range    Glucose 235 (H) 74 - 99 mg/dL    Sodium 137 136 - 145 mmol/L    Potassium 4.2 3.5 - 5.3 mmol/L    Chloride 100 98 - 107 mmol/L    Bicarbonate 30 21 - 32 mmol/L    Anion Gap 11 10 - 20 mmol/L    Urea Nitrogen 23 6 - 23 mg/dL    Creatinine 4.72 (H) 0.50 - 1.30 mg/dL    eGFR 13 (L) >60 mL/min/1.73m*2    Calcium 8.4 (L) 8.6 - 10.6 mg/dL   Lactate Dehydrogenase   Result Value Ref Range     84 - 246 U/L   Protein, Total   Result Value Ref Range    Total Protein 6.3 (L) 6.4 - 8.2 g/dL   POCT GLUCOSE   Result Value Ref Range    POCT Glucose 311 (H) 74 - 99 mg/dL     *Note: Due to a large number of results and/or encounters for the requested time period, some results have not been displayed. A complete set of results can be found in Results Review.                       Assessment/Plan   Assessment & Plan  Pneumonia of both lungs due to infectious organism, unspecified part of lung    Tolerated hemodialysis yesterday with net fluid loss -no fluid removed. 9/11 tx with 3L removed    Bp stable with tx, euvolemic on exam and has stable electrolytes . K+=4.2     Outpatient Dialysis schedule:  TTS [facility]/ Dr Bridges      Access: lt fist with +thrill/bruit- no issues - able to achieve      Anemia of ESRD: epoetin aida (Epogen,Procrit) injection 8000 Units on dialysis days.. current hgb 8.5.. will cont to monitor..  (Mircera 150mcg q2wks op)      CKD-MBD:    not on Phosphate Binder.. current phos level 3.3.. will cont to monitor..B complex-vitamin C-folic acid (Nephrocaps) capsule 1 capsule daily, calcitriol (Rocaltrol) capsule 0.5 mcg daily, cinacalcet (Sensipar) tablet 30 mg daily, paricalcitoL (Zemplar) injection 2 mcg TTS     Plan HD tomorrow with  UF as tolerated     Renal diet      Please obtain daily standing wt (if possible)     Medication to be adjusted for ESRD      Patient to continue regular HD schedule while inpatient and to follow with the outpatient nephrologist at discharge        ANÍBAL Paredes-CNP

## 2024-09-13 NOTE — DISCHARGE INSTRUCTIONS
Dear Manolo Bashir,    You were admitted due to fever, nausea/vomiting, high blood pressure. You were given IV antibiotics, anti-nausea medications, and restarted on your home blood pressure medications with improvement in your symptoms and blood pressure. You also had fluid drained from your lung which when looked at by the lab was consistent with no infection.       If you experience severe headache, chest pain, difficulty breathing, worsening nausea/vomiting please go to the nearest ED.      Medication changes:  Please see the attached pages for a complete list of your medications.    Follow up:  We have requested the following appointments for you. If you do not receive a call in 3-5 days please call [phone] to book an appointment.   - Cardiology  - Primary Care  - Continue home health    Please see the attached pages for your already scheduled appointments.      It was a pleasure being part of your care.  [facility]

## 2024-09-13 NOTE — HOSPITAL COURSE
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 He presented with fever, SOB, dry cough, nausea, vomiting, and HTN after his most recent dialysis session. He had a recent hospitalization (8/24-9/1) for pneumonia and C. diff decolonization, and his current symptoms raised concern for recurrent infection and hypertensive urgency.    On admission, the patient was hypertensive (/89), febrile to 102°F, and hypoxic, requiring 3L NC. Labs were notable for elevated BNP (1059), BUN (43), Cr (6.93), and anemia with Hgb 8.3. Imaging showed bilateral pleural effusions and right basilar consolidation. He was started on vancomycin, Zosyn, and azithromycin for HAP.     Hypertensive urgency was attributed to medication noncompliance, as the patient had not taken his nifedipine. He was restarted on his home antihypertensive regimen, including hydralazine, carvedilol, nifedipine, imdur, and Bumex on non-HD days. His BP improved during the hospital stay.    The pneumonia was managed with zosyn and azithromycin, with vancomycin discontinued after MRSA testing returned negative. Procalcitonin downtrended to 0.30 with treatment, with outpatient follow up planned for sputum culture and respiratory viral panel. Prior pleural effusion was transudative during thoracentesis on 8/26 , and repeat R side thoracentesis was found to be transudative by lights criteria by LDH and protein. He completed 48 hrs of IV Zosyn, and 3 days of Azithromycin with no further fevers after initial presentation.     The patient’s ESRD was managed by continuing his T/Th/S dialysis schedule, and an initially and extra UF of 3L on Wednesday. Recommendations from transplant nephrology were followed.  Tacrolimus 0.5 mg BID was continued, and he continued on prednisone 5 mg daily.     His gastroparesis, which was contributing to his nausea and vomiting, was managed with his home PPI and metoclopramide. Due to his poor oral intake, his glargine dose was reduced to 10 units at night, with SSI as needed.    The patient’s symptoms improved with treatment. His cough, SOB, and HTN were controlled, and his condition stabilized. Patient was discharged with home care ([facility])

## 2024-09-14 ENCOUNTER — DOCUMENTATION (OUTPATIENT)
Dept: HOME HEALTH SERVICES | Facility: HOME HEALTH | Age: 69
End: 2024-09-14
Payer: COMMERCIAL

## 2024-09-14 LAB
ADENOVIRUS RVP, VIRC: NOT DETECTED
BACTERIA BLD CULT: NORMAL
ENTEROVIRUS/RHINOVIRUS RVP, VIRC: NOT DETECTED
HUMAN BOCAVIRUS RVP, VIRC: NOT DETECTED
HUMAN CORONAVIRUS RVP, VIRC: NOT DETECTED
INFLUENZA A , VIRC: NOT DETECTED
INFLUENZA A H1N1-09 , VIRC: NOT DETECTED
INFLUENZA B PCR, VIRC: NOT DETECTED
METAPNEUMOVIRUS , VIRC: NOT DETECTED
PARAINFLUENZA PCR, VIRC: NOT DETECTED
RSV PCR, RVP, VIRC: NOT DETECTED

## 2024-09-14 NOTE — DISCHARGE SUMMARY
Discharge Diagnosis  Pneumonia of both lungs due to infectious organism, unspecified part of lung    Issues Requiring Follow-Up  - Follow up with Nephrology and resume outpatient dialysis schedule  - Follow up Cardiology   - Follow up Primary Care  - Follow up transplant nephrology  - Continue home health    Test Results Pending At Discharge  Pending Labs       Order Current Status    Body Fluid Cell Count With Differential In process    Respiratory Viral Panel In process    Blood Culture Preliminary result    Blood Culture Preliminary result    Blood Culture Preliminary result    Blood Culture Preliminary result            Hospital Course  The patient is a 69-year-old male with a history of ESRD on iHD following two renal transplants (1992, 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 He presented with fever, SOB, dry cough, nausea, vomiting, and HTN after his most recent dialysis session. He had a recent hospitalization (8/24-9/1) for pneumonia and C. diff decolonization, and his current symptoms raised concern for recurrent infection and hypertensive urgency.    On admission, the patient was hypertensive (/89), febrile to 102°F, and hypoxic, requiring 3L NC. Labs were notable for elevated BNP (1059), BUN (43), Cr (6.93), and anemia with Hgb 8.3. Imaging showed bilateral pleural effusions and right basilar consolidation. He was started on vancomycin, Zosyn, and azithromycin for HAP.     Hypertensive urgency was attributed to medication noncompliance, as the patient had not taken his nifedipine. He was restarted on his home antihypertensive regimen, including hydralazine, carvedilol, nifedipine, imdur, and Bumex on non-HD days. His BP improved during the hospital stay.    The pneumonia was managed with zosyn and azithromycin, with vancomycin  discontinued after MRSA testing returned negative. Procalcitonin downtrended to 0.30 with treatment, with outpatient follow up planned for sputum culture and respiratory viral panel. Prior pleural effusion was transudative during thoracentesis on 8/26 , and repeat R side thoracentesis was found to be transudative by lights criteria by LDH and protein. He completed 48 hrs of IV Zosyn, and 3 days of Azithromycin with no further fevers after initial presentation.     The patient’s ESRD was managed by continuing his T/Th/S dialysis schedule, and an initially and extra UF of 3L on Wednesday. Recommendations from transplant nephrology were followed. Tacrolimus 0.5 mg BID was continued, and he continued on prednisone 5 mg daily.     His gastroparesis, which was contributing to his nausea and vomiting, was managed with his home PPI and metoclopramide. Due to his poor oral intake, his glargine dose was reduced to 10 units at night, with SSI as needed.    The patient’s symptoms improved with treatment. His cough, SOB, and HTN were controlled, and his condition stabilized. Patient was discharged with home care ([facility])    Pertinent Physical Exam At Time of Discharge  Physical Exam  Constitutional:       General: He is not in acute distress.  HENT:      Head: Normocephalic and atraumatic.      Mouth/Throat:      Mouth: Mucous membranes are moist.      Pharynx: Oropharynx is clear. No oropharyngeal exudate or posterior oropharyngeal erythema.   Cardiovascular:      Rate and Rhythm: Normal rate and regular rhythm.      Pulses: Normal pulses.      Heart sounds: Normal heart sounds. No murmur heard.     No gallop.   Pulmonary:      Effort: Pulmonary effort is normal. No respiratory distress.      Breath sounds: Rales (soft rales in the bilateral based much improved) present. No wheezing or rhonchi.   Abdominal:      General: Abdomen is flat. Bowel sounds are normal. There is no distension.      Palpations: Abdomen is soft.       "Tenderness: There is no abdominal tenderness.   Musculoskeletal:         General: No swelling or tenderness.      Right lower leg: No edema.      Left lower leg: No edema.   Skin:     General: Skin is warm and dry.   Neurological:      General: No focal deficit present.      Mental Status: He is alert and oriented to person, place, and time.         Home Medications     Medication List      CHANGE how you take these medications     Lantus Solostar U-100 Insulin 100 unit/mL (3 mL) pen; Generic drug:   insulin glargine; Inject 20 units daily as directed; What changed: how   much to take, how to take this, when to take this   * tacrolimus 0.1 % ointment; Commonly known as: Protopic; Apply   topically 2 times a day.; What changed: Another medication with the same   name was removed. Continue taking this medication, and follow the   directions you see here.   * tacrolimus 0.5 mg capsule; Commonly known as: Prograf; Take 1 capsule   (0.5 mg) by mouth every 12 hours.; What changed: Another medication with   the same name was removed. Continue taking this medication, and follow the   directions you see here.  * This list has 2 medication(s) that are the same as other medications   prescribed for you. Read the directions carefully, and ask your doctor or   other care provider to review them with you.     CONTINUE taking these medications     acetaminophen 325 mg tablet; Commonly known as: Tylenol; Take 3 tablets   (975 mg) by mouth every 6 hours if needed (pain).   aspirin 81 mg chewable tablet; Chew 1 tablet (81 mg) once daily.   BD Luer-Rita Syringe 3 mL 25 x 5/8\" syringe; Generic drug: syringe with   needle; Use to inject epogen.   bumetanide 2 mg tablet; Commonly known as: Bumex; Take 1 tablet (2 mg)   by mouth 4 times a week. Take bumetanide 2mg once daily on non-dialysis   days (Sunday, Monday, Wednesday, Friday)   calcitriol 0.5 mcg capsule; Commonly known as: Rocaltrol; Take 1 capsule   (0.5 mcg) by mouth once " "daily.   carvedilol 12.5 mg tablet; Commonly known as: Coreg; Take 3 tablets   (37.5 mg) by mouth 2 times a day.   cinacalcet 30 mg tablet; Commonly known as: Sensipar; Take 1 tablet (30   mg) by mouth once daily.   Easy Touch Alcohol Prep Pads pads, medicated; Generic drug: alcohol   swabs   Gvoke HypoPen 1-Pack 1 mg/0.2 mL auto-injector; Generic drug: glucagon;   Inject 1 mg into the muscle every 15 minutes if needed (For blood glucose   41 to 70 mg/dL and no IV access).   hydrALAZINE 50 mg tablet; Commonly known as: Apresoline; Take 1 tablet   (50 mg) by mouth 3 times a day.   insulin lispro 100 unit/mL injection; Commonly known as: HumaLOG   isosorbide mononitrate ER 60 mg 24 hr tablet; Commonly known as: Imdur;   Take 1 tablet (60 mg) by mouth once daily.   metoclopramide 5 mg tablet; Commonly known as: Reglan; Take 1 tablet (5   mg) by mouth 2 times a day. 1 tablet before dinner and 1 tablet at   bedtime.   NIFEdipine ER 60 mg 24 hr tablet; Commonly known as: Adalat CC; Take 1   tablet (60 mg) by mouth 2 times a day. Do not crush, chew, or split.   pantoprazole 40 mg EC tablet; Commonly known as: ProtoNix; Take 1 tablet   (40 mg) by mouth once daily in the morning. Take before meals. Do not   crush, chew, or split. Do not start before January 22, 2024.   pravastatin 10 mg tablet; Commonly known as: Pravachol; Take 1 tablet   (10 mg) by mouth once daily at bedtime.   predniSONE 10 mg tablet; Commonly known as: Deltasone; Take 1 tablet (10   mg) by mouth once daily in the morning for 8 days, THEN 0.5 tablets (5 mg)   once daily in the morning.; Start taking on: September 1, 2024   Renal Caps 1 mg capsule; Generic drug: B complex-vitamin C-folic acid;   Take 1 capsule by mouth once daily.   TRUEplus Pen Needle 32 gauge x 5/32\" needle; Generic drug: pen needle,   diabetic; Use 4 a day as directed   * Unilet Super Thin Lancets 30 gauge misc; Generic drug: lancets; USE TO   TEST BLOOD SUGAR THREE TIMES A DAY   * " Unilet Super Thin Lancets 30 gauge misc; Generic drug: lancets; 1 each   3 times a day.  * This list has 2 medication(s) that are the same as other medications   prescribed for you. Read the directions carefully, and ask your doctor or   other care provider to review them with you.     STOP taking these medications     vancomycin 125 mg capsule; Commonly known as: Vancocin     ASK your doctor about these medications     Epogen 10,000 unit/mL injection; Generic drug: epoetin aida; Inject 1 mL   (10,000 Units) under the skin every 7 days. Do not start before April 17, 2024.       Outpatient Follow-Up  Future Appointments   Date Time Provider Department Center   9/23/2024 12:30 PM [facility] [place] [facility] 4 [facility]   9/25/2024  9:00 AM aCli Mai MD [id number] [facility]   10/2/2024 10:00 AM Elma Hutton APRN-CNP, DNP [id number] [facility]   10/3/2024  3:00 PM ANÍBAL Scott-CNP [id number] [facility]   10/16/2024 10:30 AM Vinicius Araiza MD [id number] [facility]   10/18/2024  8:30 AM Fermin Fernandez DO [id number] None   10/28/2024  1:30 PM Kurtis Jc MD [id number] [facility]   12/2/2024 10:45 AM Daxa Cote DPM [id number] [place]   12/11/2024 10:40 AM Estella Quinonez MD [id number] [facility]       Kei Luther DO  PGY-1 Internal Medicine

## 2024-09-14 NOTE — HH CARE COORDINATION
Home Care received a Referral to Resume Care for Nursing, Physical Therapy, and Occupational Therapy. We have processed the referral for a Resumption of Care on 24-48 HOURS .     If you have any questions or concerns, please feel free to contact us at [phone]. Follow the prompts, enter your five digit zip code, and you will be directed to your care team on CENTL 3.

## 2024-09-15 ENCOUNTER — PHARMACY VISIT (OUTPATIENT)
Dept: PHARMACY | Facility: CLINIC | Age: 69
End: 2024-09-15
Payer: MEDICAID

## 2024-09-15 PROCEDURE — RXMED WILLOW AMBULATORY MEDICATION CHARGE

## 2024-09-16 ENCOUNTER — PATIENT OUTREACH (OUTPATIENT)
Dept: CARE COORDINATION | Facility: CLINIC | Age: 69
End: 2024-09-16
Payer: COMMERCIAL

## 2024-09-16 SDOH — ECONOMIC STABILITY: GENERAL: WOULD YOU LIKE HELP WITH ANY OF THE FOLLOWING NEEDS?: I DONT NEED HELP WITH ANY OF THESE

## 2024-09-16 NOTE — PROGRESS NOTES
Outreach call to patient to support a smooth transition of care from recent readmission.  Spoke with patient, reviewed discharge medications, discharge instructions, assessed social needs, and provided education on importance of follow-up appointment with provider.  Will continue to monitor through transition period.   Engagement  Call Start Time: 0840 (9/16/2024  8:43 AM)    Medications  Medications reviewed with patient/caregiver?: Yes (9/16/2024  8:43 AM)  Is the patient having any side effects they believe may be caused by any medication additions or changes?: No (9/16/2024  8:43 AM)  Does the patient have all medications ordered at discharge?: Yes (9/16/2024  8:43 AM)  Is the patient taking all medications as directed (includes completed medication regime)?: Yes (9/16/2024  8:43 AM)    Appointments  Does the patient have a primary care provider?: Yes (9/16/2024  8:43 AM)  Care Management Interventions: Educated patient on importance of making appointment; Advised patient to make appointment (9/16/2024  8:43 AM)    Patient Teaching  Does the patient have access to their discharge instructions?: Yes (9/16/2024  8:43 AM)  What is the patient's perception of their health status since discharge?: Improving (9/16/2024  8:43 AM)  Is the patient/caregiver able to teach back the hierarchy of who to call/visit for symptoms/problems? PCP, Specialist, Home Health nurse, Urgent Care, ED, 911: Yes (9/16/2024  8:43 AM)    Wrap Up  Call End Time: 0845 (9/16/2024  8:43 AM)        Erika Dawkins RN [facility] [place]  [phone]

## 2024-09-16 NOTE — SIGNIFICANT EVENT
09/16/24 0845   Social Determinants of Health- Help Requested   Would you like help with any of the following needs? I dont need help with any of these

## 2024-09-18 ENCOUNTER — HOME CARE VISIT (OUTPATIENT)
Dept: HOME HEALTH SERVICES | Facility: HOME HEALTH | Age: 69
End: 2024-09-18
Payer: COMMERCIAL

## 2024-09-18 PROCEDURE — G0151 HHCP-SERV OF PT,EA 15 MIN: HCPCS

## 2024-09-18 SDOH — ECONOMIC STABILITY: HOUSING INSECURITY: OPEN FLAME PRESENT: 1

## 2024-09-18 SDOH — HEALTH STABILITY: PHYSICAL HEALTH: EXERCISE TYPE: STANDING HEP

## 2024-09-18 SDOH — HEALTH STABILITY: PHYSICAL HEALTH: EXERCISE ACTIVITY: STANDING HEP

## 2024-09-18 SDOH — HEALTH STABILITY: PHYSICAL HEALTH: PHYSICAL EXERCISE: STANDING

## 2024-09-18 SDOH — HEALTH STABILITY: PHYSICAL HEALTH: PHYSICAL EXERCISE: 15

## 2024-09-18 SDOH — HEALTH STABILITY: PHYSICAL HEALTH
EXERCISE COMMENTS: STANDING HEP INCLUDES: CALF RAISES, KNEE FLEXION, HIP FLEXION, MARCHING, HIP ABDUCTION, HIP EXTENSION, AND MINI SQUATS

## 2024-09-18 SDOH — ECONOMIC STABILITY: HOUSING INSECURITY
HOME SAFETY: THERAPIST DISCUSSED WITH PATIENT AND CAREGIVER IMPORTANCE OF HOME SAFETY, SPECIFICALLY FOR CLUTTER FREE WALKING PATHWAYS AND ADEQUATE LIGHTING

## 2024-09-18 SDOH — HEALTH STABILITY: PHYSICAL HEALTH: EXERCISE ACTIVITIES SETS: 1

## 2024-09-18 ASSESSMENT — ACTIVITIES OF DAILY LIVING (ADL)
OASIS_M1830: 01
AMBULATION ASSISTANCE: INDEPENDENT
AMBULATION ASSISTANCE ON FLAT SURFACES: 1
PHYSICAL TRANSFERS ASSESSED: 1
ENTERING_EXITING_HOME: SUPERVISION
CURRENT_FUNCTION: INDEPENDENT
AMBULATION ASSISTANCE: 1

## 2024-09-18 ASSESSMENT — ENCOUNTER SYMPTOMS
SUBJECTIVE PAIN PROGRESSION: UNCHANGED
PAIN: PATIENT INDEPENDENT WITH PAIN CONTROL TECHNIQUES
PAIN: 1
DEPRESSION: 0
PAIN LOCATION - PAIN FREQUENCY: INTERMITTENT
PAIN LOCATION - PAIN SEVERITY: 5/10
PERSON REPORTING PAIN: PATIENT
LOSS OF SENSATION IN FEET: 1
OCCASIONAL FEELINGS OF UNSTEADINESS: 0
PAIN LOCATION - EXACERBATING FACTORS: PROLONGED STANDING
PAIN LOCATION: BACK
PAIN SEVERITY GOAL: 3/10
LIMITED RANGE OF MOTION: 1
ARTHRALGIAS: 1
LOWEST PAIN SEVERITY IN PAST 24 HOURS: 3/10
HIGHEST PAIN SEVERITY IN PAST 24 HOURS: 6/10

## 2024-09-20 ENCOUNTER — HOME CARE VISIT (OUTPATIENT)
Dept: HOME HEALTH SERVICES | Facility: HOME HEALTH | Age: 69
End: 2024-09-20
Payer: COMMERCIAL

## 2024-09-22 ENCOUNTER — HOME CARE VISIT (OUTPATIENT)
Dept: HOME HEALTH SERVICES | Facility: HOME HEALTH | Age: 69
End: 2024-09-22
Payer: COMMERCIAL

## 2024-09-23 ENCOUNTER — HOME CARE VISIT (OUTPATIENT)
Dept: HOME HEALTH SERVICES | Facility: HOME HEALTH | Age: 69
End: 2024-09-23
Payer: COMMERCIAL

## 2024-09-23 ENCOUNTER — HOSPITAL ENCOUNTER (OUTPATIENT)
Dept: RADIOLOGY | Facility: HOSPITAL | Age: 69
Discharge: HOME | End: 2024-09-23
Payer: COMMERCIAL

## 2024-09-23 DIAGNOSIS — Z01.818 PRE-TRANSPLANT EVALUATION FOR KIDNEY TRANSPLANT: ICD-10-CM

## 2024-09-23 PROCEDURE — 75571 CT HRT W/O DYE W/CA TEST: CPT

## 2024-09-23 PROCEDURE — G0299 HHS/HOSPICE OF RN EA 15 MIN: HCPCS

## 2024-09-23 SDOH — ECONOMIC STABILITY: FOOD INSECURITY: MEALS PER DAY: 3

## 2024-09-23 ASSESSMENT — ENCOUNTER SYMPTOMS
CHANGE IN APPETITE: UNCHANGED
HIGHEST PAIN SEVERITY IN PAST 24 HOURS: 0/10
PAIN LOCATION: GENERALIZED
LAST BOWEL MOVEMENT: 67106
APPETITE LEVEL: FAIR
SUBJECTIVE PAIN PROGRESSION: UNCHANGED
LOWEST PAIN SEVERITY IN PAST 24 HOURS: 0/10
PAIN SEVERITY GOAL: 0/10
PAIN: 1

## 2024-09-25 ENCOUNTER — CONSULT (OUTPATIENT)
Dept: CARDIOLOGY | Facility: HOSPITAL | Age: 69
End: 2024-09-25
Payer: COMMERCIAL

## 2024-09-25 VITALS
DIASTOLIC BLOOD PRESSURE: 63 MMHG | HEART RATE: 69 BPM | SYSTOLIC BLOOD PRESSURE: 116 MMHG | OXYGEN SATURATION: 100 % | WEIGHT: 155 LBS | BODY MASS INDEX: 23.49 KG/M2 | HEIGHT: 68 IN

## 2024-09-25 DIAGNOSIS — I10 ESSENTIAL HYPERTENSION: ICD-10-CM

## 2024-09-25 DIAGNOSIS — Z01.818 PRE-TRANSPLANT EVALUATION FOR KIDNEY TRANSPLANT: Primary | ICD-10-CM

## 2024-09-25 DIAGNOSIS — I50.32 CHRONIC HEART FAILURE WITH PRESERVED EJECTION FRACTION: ICD-10-CM

## 2024-09-25 DIAGNOSIS — E78.5 HYPERLIPIDEMIA, UNSPECIFIED HYPERLIPIDEMIA TYPE: ICD-10-CM

## 2024-09-25 PROCEDURE — RXMED WILLOW AMBULATORY MEDICATION CHARGE

## 2024-09-25 PROCEDURE — 99215 OFFICE O/P EST HI 40 MIN: CPT | Performed by: STUDENT IN AN ORGANIZED HEALTH CARE EDUCATION/TRAINING PROGRAM

## 2024-09-25 ASSESSMENT — PAIN SCALES - GENERAL: PAINLEVEL: 7

## 2024-09-25 NOTE — PROGRESS NOTES
Location of visit: [facility]   Type of Visit: New    Chief Complaint:  Patient was referred to Cardiology by Abdominal Transplant Team for pretransplant evaluation.    History Of Present Illness:    Manolo Bashir is a 69 y.o. male, with history significant for kidney transplant x2, HTN, hypertensive heart disease, HFpEF, HLD, HTN, mild dilation of ascending aorta, T2DM, GERD, ESRD on hemodialysis, who visits Cardiology today as an initial assessment for kidney pre-transplant evaluation.    Patient denies chest pain, dyspnea on exertion, shortness of breath, orthopnea, PND, nocturia, edema, palpitations, dizziness, lightheadedness, syncope, or claudication. He has had episodes of dyspnea on exertion associated with being under dialyzed. If he is on his dry weight he denies symptoms.    Blood pressure: 116/63 mmHg  HR: 69 bpm    Last ECG 9/10/2024 shows sinus rhythm at 94 bpm, normal AV conduction and ventricular repolarization.    Contrast allergy: no    Calcium Score:  30 Agatston Units, Date 9/22/2024    Stress test: stress MRI with Lexiscan, Date 4/30/2024. Results:  1. The left ventricle is dilated (EDVi-147 ml/m2) and has overall normal global systolic function(LVEF-54 %). There are no major wall motion abnormalities.  2. Normal myocardial perfusion at rest and post stress without evidence of inducible ischemia.  3. Linear mid myocardial delayed enhancement within the basal inferolateral wall in a nonischemic pattern.  4. Circumferential left ventricular hypertrophy, most prominent in the base.  5. Mild qualitative mitral regurgitation. Regurgitant fraction = 3%.  6. Biatrial dilatation.  7. Borderline dilated main pulmonary artery which can be seen with pulmonary hypertension.    Transthoracic echocardiogram 4/5/2024: normal LV function with 54% LVEF, moderate LV wall thickening, no regional wall motion abnormalities, grade II diastolic dysfunction.    Past Medical History:  He has a past medical  history of kidney transplant x2, HTN, HLD, HTN, hypertensive heart disease, HFpEF, mild dilation of ascending aorta, T2DM, GERD, ESRD on hemodialysis.    Past Surgical History:  He has a past surgical history that includes Prostatectomy (10/11/2013); Ileostomy (04/25/2017); Other surgical history (04/21/2017); Ileostomy closure (08/17/2015); Other surgical history (08/17/2015); Other surgical history (08/17/2015); US guided percutaneous peritoneal or retroperitoneal fluid collection drainage (10/20/2022); transplant, kidney, open (1992); transplant, kidney, open (2013); and US guided percutaneous biopsy renal left (Left, 11/20/2023).    Social History:  He reports that he has never smoked. He has been exposed to tobacco smoke. He has never used smokeless tobacco. He reports current alcohol use. He reports current drug use. Drug: Oxycodone.    Family History:  Family History   Problem Relation Name Age of Onset    Bone cancer Mother      Other (corona's sarcome of the bone marrow) Mother      Prostate cancer Father      Diabetes Other Family Hist     Hypertension Other Family Hist      Allergies:  Patient has no known allergies.    Outpatient Medications:  Current Outpatient Medications   Medication Instructions    acetaminophen (TYLENOL) 975 mg, oral, Every 6 hours PRN    aspirin 81 mg, oral, Daily    B complex-vitamin C-folic acid (Nephrocaps) 1 mg capsule 1 capsule, oral, Daily    bumetanide (BUMEX) 2 mg, oral, 4 times weekly, Take bumetanide 2mg once daily on non-dialysis days (Sunday, Monday, Wednesday, Friday)    calcitriol (ROCALTROL) 0.5 mcg, oral, Daily    carvedilol (COREG) 37.5 mg, oral, 2 times daily    cinacalcet (SENSIPAR) 30 mg, oral, Daily    Easy Touch Alcohol Prep Pads pads, medicated     epoetin aida 10,000 unit/mL injection Inject 1 mL (10,000 Units) under the skin every 7 days. Do not start before April 17, 2024.    Gvoke HypoPen 1-Pack 1 mg, intramuscular, Every 15 min PRN    hydrALAZINE  "(APRESOLINE) 50 mg, oral, 3 times daily    insulin glargine (Lantus) 100 unit/mL (3 mL) pen Inject 20 units daily as directed    insulin lispro (HumaLOG) 100 unit/mL injection subcutaneous, 3 times daily (morning, midday, late afternoon), 100-199 2 units 200-299 4 units 300-399 6 units     isosorbide mononitrate ER (IMDUR) 60 mg, oral, Daily    lancets (Unilet Super Thin Lancets) 30 gauge misc 3 times daily    metoclopramide (REGLAN) 5 mg, oral, 2 times daily, 1 tablet before dinner and 1 tablet at bedtime.    NIFEdipine ER (ADALAT CC) 60 mg, oral, 2 times daily, Do not crush, chew, or split.    pantoprazole (ProtoNix) 40 mg EC tablet Take 1 tablet (40 mg) by mouth once daily in the morning. Take before meals. Do not crush, chew, or split. Do not start before January 22, 2024.    pen needle, diabetic (TechLITE Pen Needle) 32 gauge x 5/32\" needle Use 4 a day as directed    pravastatin (PRAVACHOL) 10 mg, oral, Nightly    predniSONE (Deltasone) 10 mg tablet Take 1 tablet (10 mg) by mouth once daily in the morning for 8 days, THEN 0.5 tablets (5 mg) once daily in the morning.    syringe with needle 3 mL 25 x 5/8\" syringe Use to inject epogen.    tacrolimus (Prograf) 0.5 mg capsule Take 1 capsule (0.5 mg) by mouth every 12 hours.    tacrolimus (Protopic) 0.1 % ointment Topical, 2 times daily    Unilet Super Thin Lancets 30 gauge misc 1 each, miscellaneous, 3 times daily     Last Recorded Vitals:  Vitals:    09/25/24 0901   BP: 116/63   BP Location: Right arm   Patient Position: Sitting   Pulse: 69   SpO2: 100%   Weight: 70.3 kg (155 lb)   Height: 1.727 m (5' 8\")     Physical Exam:      9/25/2024     9:01 AM 9/13/2024     3:59 PM 9/13/2024     1:00 PM 9/13/2024     9:16 AM 9/12/2024     4:46 PM 9/12/2024     1:18 PM   Vitals   Systolic 116 140 140 136 135 140   Diastolic 63 63 69 63 70 68   Heart Rate 69 73 73 80 77 80   Temp  36.6 °C (97.9 °F)  36.3 °C (97.3 °F) 36.7 °C (98.1 °F) 36.7 °C (98.1 °F)   Resp  16  16 17 17 " "  Height (in) 1.727 m (5' 8\")        Weight (lb) 155        BMI 23.57 kg/m2        BSA (m2) 1.84 m2          Wt Readings from Last 5 Encounters:   09/25/24 70.3 kg (155 lb)   09/10/24 72.6 kg (160 lb)   09/04/24 70 kg (154 lb 5.2 oz)   08/24/24 72.6 kg (160 lb)   08/09/24 71.4 kg (157 lb 6.4 oz)     General: Sitting up comfortably in chair; in no apparent distress.  HEENT: Normocephalic; atraumatic. Well hydrated.  Eyes: Anicteric sclera. Extraocular movement intact.  Neck: Supple; no thyromegaly; normal jugular venous pressure, no bruits.  Respiratory: Bilateral air entry equal. No wheezing.  Cardiovascular: Normal S1, S2; no murmurs auscultated.  Abdomen: Nondistended; nontender. (+) bowel sounds.  Extremities: No peripheral edema present. Pulses 2+ diffusely.  Neurological: Oriented to time, place, and person; nonfocal.  Psychiatric: Normal affect.     Last Labs Reviewed:  CBC -  Recent Labs     09/13/24  0520 09/12/24  0834 09/11/24  0412 09/10/24  1346 08/31/24  0552   WBC 3.1* 3.0* 6.2 6.7 4.5   HGB 8.5* 8.3* 9.2* 9.2* 7.8*   HCT 26.5* 25.9* 28.0* 27.0* 24.3*   PLT 77* 110* 103* 144* 164   MCV 88 88 87 84 84     CMP -  Recent Labs     09/13/24  0520 09/12/24  0834 09/11/24  0412 09/10/24  1848 09/10/24  1346 08/31/24  0552    133* 132*  --  135* 133*   K 4.2 4.1 4.8 4.4 4.3 4.0    98 97*  --  97* 97*   CO2 30 27 28  --  33* 28   ANIONGAP 11 12 12  --  9* 12   BUN 23 43* 29*  --  26* 23   CREATININE 4.72* 6.93* 4.80*  --  4.15* 7.08*   EGFR 13* 8* 12*  --  15* 8*   MG 2.05 1.96 1.91  --  1.66 1.85     Recent Labs     09/13/24  0520 09/12/24  0834 09/12/24  0559 09/11/24  0412 09/10/24  1346 08/26/24  1337 08/25/24  0910 05/18/24  0452 05/17/24  0546 05/11/24  1753 05/11/24  0101 03/11/24  2356 03/11/24  0431 01/17/24  0459 01/16/24  1242 10/20/23  0844 10/19/23  0333   ALBUMIN 3.1* 2.8* 3.1* 3.2* 3.5   < > 3.3*   < > 2.9*   < > 3.4   < > 3.4   < > 2.5*   < > 3.1*   ALKPHOS 68  --  69 75 121  --  68  "  < > 41   < > 47   < > 47   < > 46   < > 61   ALT 12  --  11 17 20  --  10   < > 14   < > 48   < > 44   < > 15   < > 6*   AST 13  --  12 14 16  --  22   < > 13   < > 48*   < > 45*   < > 12   < > 19   BILITOT 0.4  --  0.5 0.7 0.7  --  0.6   < > 0.3   < > 0.4   < > 0.4   < > 0.2   < > 0.5   LIPASE  --   --   --   --   --   --   --   --  3*  --  3*  --  9  --  12  --  7*    < > = values in this interval not displayed.     LIPID PANEL -   Recent Labs     07/10/24  1203 10/26/23  1215 03/17/22  1322 02/25/21  0003 02/05/20  0953   CHOL 96 99 128 94 108   LDLF  --   --  61 50 48   LDLCALC  --  41  --   --   --    HDL 44.3 48.6 45.4 38.0* 50.1   TRIG  --  46 110 31 48     COAGULATION PANEL -  Recent Labs     09/11/24  0412 01/17/24  0459 01/05/24  1744 11/28/23  1104 11/21/23  0628 10/20/23  0844 03/25/22  1037 01/06/22  0619 02/24/21  1551 06/07/20  0616   INR 1.3*  --  1.0  --   --  1.2*   < > 1.1   < > 1.2*   HAUF  --  0.5  --   --   --   --   --   --   --   --    HAPTOGLOBIN  --   --   --  <10* <10*  --    < >  --   --   --    FIBRINOGEN  --   --   --   --   --  250  --  157*  --  484*    < > = values in this interval not displayed.     HEME/ENDO -  Recent Labs     08/24/24  1221 07/10/24  1203 05/11/24  1244 05/11/24  0101 05/01/24  0913 03/19/24  0930 03/11/24  2356 01/20/24  0442 10/26/23  1215 08/22/23  0628 07/19/23  1024 10/16/22  1058 10/07/22  1142 08/03/22  0831 03/17/22  1322 01/05/22 2024 11/12/21  1300   FERRITIN 985*  --  602*  --   --  720* 404*  --   --    < >  --    < >  --   --   --    < > 229   IRONSAT 9*  --   --  13* 25 29 15*  --   --    < >  --    < >  --   --   --   --  33   TSH  --   --   --   --   --   --   --   --   --   --  1.94  --  0.76  --  0.86  --  0.95   HGBA1C  --  6.6*  --   --   --   --   --  7.7* 6.0*  --  8.4*  --  10.6*   < > 8.5*   < > 7.7*    < > = values in this interval not displayed.      CARDIOVASCULAR  Recent Labs     09/13/24  0520 09/10/24  1848 09/10/24  1416  08/30/24  0353 08/27/24  0705 08/26/24  1337 06/26/24  1211 06/26/24  0916 05/11/24  0228 05/11/24  0101 04/03/24  1520 04/03/24  1407 03/11/24  0602 03/11/24  0431 02/01/24  0917 12/16/23  1138 11/28/23  1104 11/21/23  1527 11/15/23  1413 10/19/23  0941 05/06/23  2121 10/20/22  0809 10/15/22  0916 01/05/22  2024 10/22/21  1824     --   --   --   --  156  --   --   --   --   --   --   --   --   --   --  238 209  --  144   < >  --   --  142  --    TROPHS  --  52  --  21 29  --  28 30   < > 41   < > 38   < > 45  --    < >  --   --    < > 48   < >  --   --   --   --    BNP  --   --  1,059*  --   --   --   --  687*  --  855*  --  674*  --  976*  --    < >  --   --    < >  --    < >  --   --   --   --    CRP  --   --   --   --   --   --   --   --   --   --   --   --   --   --  0.57  --   --   --   --   --   --  3.17* 25.57* <0.10 1.49*    < > = values in this interval not displayed.     Last Cardiology/Imaging Tests Personally Reviewed (if images available) and Interpreted:  ECG:  Encounter Date: 09/10/24   ECG 12 lead   Result Value    Ventricular Rate 94    Atrial Rate 94    OK Interval 174    QRS Duration 132    QT Interval 348    QTC Calculation(Bazett) 435    P Axis 83    R Axis -20    T Axis 51    QRS Count 15    Q Onset 217    P Onset 130    P Offset 175    T Offset 391    QTC Fredericia 404    Narrative    Normal sinus rhythm  Nonspecific intraventricular block  Possible LVH     Echocardiogram:  Transthoracic Echo (TTE) Complete; 04/05/2024  1. Left ventricular systolic function is normal with a 54% estimated ejection fraction.  2. Spectral Doppler shows a pseudonormal pattern of left ventricular diastolic filling.  3. Left ventricular cavity size is severely dilated.  4. Moderately increased left ventricular septal thickness.  5. There is severe left ventricular hypertrophy.  6. Mild to moderate aortic valve regurgitation.  7. The left atrium is severely dilated.  8. The right atrium is severely  dilated.  9. Mildly elevated RVSP.     Cath:  No results found.    Stress Test:  MR CARDIAC W AND WO IV CONTRAST W REGADENOSON STRESS; 4/30/2024  1. The left ventricle is dilated (EDVi-147 ml/m2) and has overall normal global systolic function(LVEF-54 %). There are no major wall motion abnormalities.  2. Normal myocardial perfusion at rest and post stress without evidence of inducible ischemia.  3. Linear mid myocardial delayed enhancement within the basal inferolateral wall in a nonischemic pattern.  4. Circumferential left ventricular hypertrophy, most prominent in the base.  5. Mild qualitative mitral regurgitation. Regurgitant fraction = 3%.  6. Biatrial dilatation.  7. Borderline dilated main pulmonary artery which can be seen with pulmonary hypertension.    Cardiac CT/MRI:  CT cardiac scoring wo IV contrast; 09/23/2024  FINDINGS:  The score and distribution of calcium in the coronary arteries is as follows:  LM 0,  LAD 2.25,  LCx 5.02,  RCA 23.27,  Total 30.54    The visualized ascending thoracic aorta measures 4 cm in diameter. The heart is normal in size. No pericardial effusion is present. Main pulmonary artery is measuring 3.2 cm    CV RISK FACTORS:   # Hypertension: Last BP: 116/63.  # Hyperlipidemia: Last Tchol 96 / LDL No results found for requested labs within last 365 days. / HDL 44.3 / TRIG 46 (7/10/2024: 12:03 PM).  # Type II Diabetes Mellitus: Last A1c 6.6 (7/10/2024: 12:03 PM).  # Obesity: Last BMI: 23.57.  # CKD: Last BUN/Cr (GFR): 23/4.72 (13), 9/13/2024:  5:20 AM.    ASCV RISK:  The ASCVD Risk score (Abi DK, et al., 2019) failed to calculate for the following reasons:    The valid total cholesterol range is 130 to 320 mg/dL    Assessment:  69 y.o. male, with history significant for kidney transplant x2, HTN, HLD, HTN, hypertensive heart disease, HFpEF, mild dilation of ascending aorta, T2DM, GERD, ESRD on hemodialysis, who visits Cardiology today as an initial assessment for kidney  pre-transplant evaluation. He is denies CV symptoms and has normal exercise capacity for age. Workup showed normal LVEF with hypertensive heart disease and mild RVSP elevation, low calcium score and stress MRI with no ischemia, mild non ischemic inferolateral scar and normal LVEF.    Assessment & Plan  Pre-transplant evaluation for kidney transplant  - Given the patient's clinical assessment, there is no contraindication to list for kidney transplant from the cardiac standpoint.   - Cardiology evaluation every 2 years with nuclear stress test is recommended until transplanted  Essential hypertension  - Appears well controlled, recommended to continue scheme  Hyperlipidemia, unspecified hyperlipidemia type  - With statin treatment and last LDL <70  - Recommended to continue pravastatin and yearly LDL  Chronic heart failure with preserved ejection fraction  - Well treated, dialysis dependent  - No further study required    I will contact the Transplant team once the results are available to define if the cardiac clearance is completed or patient will need a follow-up appointment.  I spent 60 minutes assessing the case between pre-charting, face-to-face patient interaction, and documentation    Cali Mai MD

## 2024-09-28 ENCOUNTER — PHARMACY VISIT (OUTPATIENT)
Dept: PHARMACY | Facility: CLINIC | Age: 69
End: 2024-09-28
Payer: MEDICAID

## 2024-09-28 DIAGNOSIS — R11.2 NAUSEA AND VOMITING, UNSPECIFIED VOMITING TYPE: ICD-10-CM

## 2024-09-28 PROCEDURE — RXMED WILLOW AMBULATORY MEDICATION CHARGE

## 2024-09-29 VITALS
TEMPERATURE: 98.6 F | OXYGEN SATURATION: 97 % | HEART RATE: 77 BPM | DIASTOLIC BLOOD PRESSURE: 60 MMHG | SYSTOLIC BLOOD PRESSURE: 114 MMHG | RESPIRATION RATE: 20 BRPM

## 2024-09-29 ASSESSMENT — ENCOUNTER SYMPTOMS
PAIN LOCATION - PAIN QUALITY: ACHY
PAIN LOCATION - PAIN FREQUENCY: INTERMITTENT
PAIN LOCATION - PAIN SEVERITY: 0/10

## 2024-09-30 PROBLEM — I50.32 CHRONIC HEART FAILURE WITH PRESERVED EJECTION FRACTION: Status: ACTIVE | Noted: 2024-09-30

## 2024-09-30 PROBLEM — Z01.818 PRE-TRANSPLANT EVALUATION FOR KIDNEY TRANSPLANT: Status: ACTIVE | Noted: 2024-09-30

## 2024-09-30 PROCEDURE — RXMED WILLOW AMBULATORY MEDICATION CHARGE

## 2024-09-30 RX ORDER — METOCLOPRAMIDE 5 MG/1
5 TABLET ORAL 2 TIMES DAILY
Qty: 60 TABLET | Refills: 0 | Status: SHIPPED | OUTPATIENT
Start: 2024-09-30 | End: 2025-09-30

## 2024-09-30 NOTE — ASSESSMENT & PLAN NOTE
- Given the patient's clinical assessment, there is no contraindication to list for kidney transplant from the cardiac standpoint.   - Cardiology evaluation every 2 years with nuclear stress test is recommended until transplanted

## 2024-10-02 ENCOUNTER — OFFICE VISIT (OUTPATIENT)
Dept: PRIMARY CARE | Facility: CLINIC | Age: 69
End: 2024-10-02
Payer: COMMERCIAL

## 2024-10-02 ENCOUNTER — APPOINTMENT (OUTPATIENT)
Dept: HEMATOLOGY/ONCOLOGY | Facility: HOSPITAL | Age: 69
End: 2024-10-02
Payer: COMMERCIAL

## 2024-10-02 ENCOUNTER — HOME CARE VISIT (OUTPATIENT)
Dept: HOME HEALTH SERVICES | Facility: HOME HEALTH | Age: 69
End: 2024-10-02
Payer: COMMERCIAL

## 2024-10-02 ENCOUNTER — PHARMACY VISIT (OUTPATIENT)
Dept: PHARMACY | Facility: CLINIC | Age: 69
End: 2024-10-02
Payer: MEDICAID

## 2024-10-02 VITALS
RESPIRATION RATE: 16 BRPM | SYSTOLIC BLOOD PRESSURE: 167 MMHG | OXYGEN SATURATION: 100 % | DIASTOLIC BLOOD PRESSURE: 74 MMHG | WEIGHT: 156 LBS | TEMPERATURE: 97.9 F | BODY MASS INDEX: 23.64 KG/M2 | HEART RATE: 63 BPM | HEIGHT: 68 IN

## 2024-10-02 DIAGNOSIS — Z99.2 ESRD (END STAGE RENAL DISEASE) ON DIALYSIS (MULTI): ICD-10-CM

## 2024-10-02 DIAGNOSIS — D47.2 MGUS (MONOCLONAL GAMMOPATHY OF UNKNOWN SIGNIFICANCE): Primary | ICD-10-CM

## 2024-10-02 DIAGNOSIS — I50.32 CHRONIC HEART FAILURE WITH PRESERVED EJECTION FRACTION: ICD-10-CM

## 2024-10-02 DIAGNOSIS — I10 ESSENTIAL HYPERTENSION: ICD-10-CM

## 2024-10-02 DIAGNOSIS — J18.9 PNEUMONIA OF BOTH LUNGS DUE TO INFECTIOUS ORGANISM, UNSPECIFIED PART OF LUNG: Primary | ICD-10-CM

## 2024-10-02 DIAGNOSIS — E11.9 DIABETES MELLITUS TYPE 2 WITHOUT RETINOPATHY (MULTI): ICD-10-CM

## 2024-10-02 DIAGNOSIS — N18.6 ESRD (END STAGE RENAL DISEASE) ON DIALYSIS (MULTI): ICD-10-CM

## 2024-10-02 PROCEDURE — 99214 OFFICE O/P EST MOD 30 MIN: CPT | Performed by: NURSE PRACTITIONER

## 2024-10-02 PROCEDURE — 3077F SYST BP >= 140 MM HG: CPT | Performed by: NURSE PRACTITIONER

## 2024-10-02 PROCEDURE — 3044F HG A1C LEVEL LT 7.0%: CPT | Performed by: NURSE PRACTITIONER

## 2024-10-02 PROCEDURE — 3078F DIAST BP <80 MM HG: CPT | Performed by: NURSE PRACTITIONER

## 2024-10-02 PROCEDURE — 3008F BODY MASS INDEX DOCD: CPT | Performed by: NURSE PRACTITIONER

## 2024-10-02 PROCEDURE — 1125F AMNT PAIN NOTED PAIN PRSNT: CPT | Performed by: NURSE PRACTITIONER

## 2024-10-02 PROCEDURE — 1111F DSCHRG MED/CURRENT MED MERGE: CPT | Performed by: NURSE PRACTITIONER

## 2024-10-02 PROCEDURE — 1036F TOBACCO NON-USER: CPT | Performed by: NURSE PRACTITIONER

## 2024-10-02 PROCEDURE — 3062F POS MACROALBUMINURIA REV: CPT | Performed by: NURSE PRACTITIONER

## 2024-10-02 ASSESSMENT — PAIN SCALES - GENERAL: PAINLEVEL: 6

## 2024-10-02 ASSESSMENT — LIFESTYLE VARIABLES: HOW MANY STANDARD DRINKS CONTAINING ALCOHOL DO YOU HAVE ON A TYPICAL DAY: PATIENT DOES NOT DRINK

## 2024-10-02 ASSESSMENT — ENCOUNTER SYMPTOMS
OCCASIONAL FEELINGS OF UNSTEADINESS: 0
LOSS OF SENSATION IN FEET: 0
DEPRESSION: 0

## 2024-10-02 NOTE — PATIENT INSTRUCTIONS
It was good to see you today. Your lungs are clear and your Heart was regular. Your Blood pressure is elevated today, but we discussed your home readings and this seems to be a pattern with the more elevated BP in the morning.  I would like to see you back mid November.

## 2024-10-03 ENCOUNTER — APPOINTMENT (OUTPATIENT)
Dept: HEMATOLOGY/ONCOLOGY | Facility: HOSPITAL | Age: 69
End: 2024-10-03
Payer: COMMERCIAL

## 2024-10-04 ENCOUNTER — APPOINTMENT (OUTPATIENT)
Dept: TRANSPLANT | Facility: HOSPITAL | Age: 69
End: 2024-10-04

## 2024-10-04 ENCOUNTER — TELEPHONE (OUTPATIENT)
Dept: TRANSPLANT | Facility: HOSPITAL | Age: 69
End: 2024-10-04
Payer: COMMERCIAL

## 2024-10-04 ENCOUNTER — HOME CARE VISIT (OUTPATIENT)
Dept: HOME HEALTH SERVICES | Facility: HOME HEALTH | Age: 69
End: 2024-10-04
Payer: COMMERCIAL

## 2024-10-04 DIAGNOSIS — Z94.0 KIDNEY TRANSPLANTED (HHS-HCC): ICD-10-CM

## 2024-10-04 DIAGNOSIS — I15.1 HYPERTENSION SECONDARY TO OTHER RENAL DISORDERS: ICD-10-CM

## 2024-10-04 DIAGNOSIS — N18.4 ACUTE RENAL FAILURE WITH ACUTE RENAL CORTICAL NECROSIS SUPERIMPOSED ON STAGE 4 CHRONIC KIDNEY DISEASE (MULTI): ICD-10-CM

## 2024-10-04 DIAGNOSIS — N17.1 ACUTE RENAL FAILURE WITH ACUTE RENAL CORTICAL NECROSIS SUPERIMPOSED ON STAGE 4 CHRONIC KIDNEY DISEASE (MULTI): ICD-10-CM

## 2024-10-04 PROCEDURE — RXMED WILLOW AMBULATORY MEDICATION CHARGE

## 2024-10-04 NOTE — TELEPHONE ENCOUNTER
Patient called requesting refill for prescription.  Patient's demographics verified, including name,d.o.b and address .  Patient called requesting a refill of a prescription of  hydrALAZINE (Apresoline) 50 mg tablet    Patient has enough medication for 0 more days.  Patient is requesting prescription be sent to   Pending sale to [facility]    Phone number is   [phone]   Patient's call back number is   [phone]

## 2024-10-04 NOTE — TELEPHONE ENCOUNTER
Returned call to the patient. Requesting refill for hydralazine. He is taking hydralazine differently. Taking 50 mg tablet (2 tablets) BID. Before medication systolic blood pressure is in the 160's systolic. Recheck blood pressure is 120/54. Updated Rx sent to [facility] pending Dr. Arroyo signature.

## 2024-10-06 ENCOUNTER — PHARMACY VISIT (OUTPATIENT)
Dept: PHARMACY | Facility: CLINIC | Age: 69
End: 2024-10-06
Payer: MEDICAID

## 2024-10-06 DIAGNOSIS — N17.1 ACUTE RENAL FAILURE WITH ACUTE RENAL CORTICAL NECROSIS SUPERIMPOSED ON STAGE 4 CHRONIC KIDNEY DISEASE (MULTI): ICD-10-CM

## 2024-10-06 DIAGNOSIS — N18.4 ACUTE RENAL FAILURE WITH ACUTE RENAL CORTICAL NECROSIS SUPERIMPOSED ON STAGE 4 CHRONIC KIDNEY DISEASE (MULTI): ICD-10-CM

## 2024-10-06 DIAGNOSIS — I15.9 SECONDARY HYPERTENSION: ICD-10-CM

## 2024-10-06 DIAGNOSIS — I15.1 HYPERTENSION SECONDARY TO OTHER RENAL DISORDERS: ICD-10-CM

## 2024-10-06 DIAGNOSIS — Z94.0 KIDNEY TRANSPLANTED (HHS-HCC): ICD-10-CM

## 2024-10-07 RX ORDER — HYDRALAZINE HYDROCHLORIDE 50 MG/1
100 TABLET, FILM COATED ORAL 2 TIMES DAILY
Qty: 120 TABLET | Refills: 11 | Status: SHIPPED | OUTPATIENT
Start: 2024-10-07 | End: 2025-10-07

## 2024-10-07 RX ORDER — NIFEDIPINE 60 MG/1
60 TABLET, FILM COATED, EXTENDED RELEASE ORAL 2 TIMES DAILY
Qty: 60 TABLET | Refills: 1 | Status: SHIPPED | OUTPATIENT
Start: 2024-10-07 | End: 2024-12-06

## 2024-10-07 RX ORDER — HYDRALAZINE HYDROCHLORIDE 50 MG/1
50 TABLET, FILM COATED ORAL 3 TIMES DAILY
Qty: 90 TABLET | Refills: 0 | OUTPATIENT
Start: 2024-10-07 | End: 2024-11-06

## 2024-10-08 ENCOUNTER — TELEPHONE (OUTPATIENT)
Dept: TRANSPLANT | Facility: HOSPITAL | Age: 69
End: 2024-10-08

## 2024-10-09 PROCEDURE — RXMED WILLOW AMBULATORY MEDICATION CHARGE

## 2024-10-11 ENCOUNTER — HOME CARE VISIT (OUTPATIENT)
Dept: HOME HEALTH SERVICES | Facility: HOME HEALTH | Age: 69
End: 2024-10-11
Payer: COMMERCIAL

## 2024-10-11 VITALS
OXYGEN SATURATION: 100 % | RESPIRATION RATE: 18 BRPM | SYSTOLIC BLOOD PRESSURE: 114 MMHG | TEMPERATURE: 97.9 F | HEART RATE: 70 BPM | DIASTOLIC BLOOD PRESSURE: 60 MMHG

## 2024-10-11 PROCEDURE — G0300 HHS/HOSPICE OF LPN EA 15 MIN: HCPCS

## 2024-10-11 ASSESSMENT — ENCOUNTER SYMPTOMS
PAIN LOCATION - PAIN QUALITY: ACHE
PAIN LOCATION: LEFT ANKLE
HIGHEST PAIN SEVERITY IN PAST 24 HOURS: 10/10
LAST BOWEL MOVEMENT: 67123
PAIN LOCATION - PAIN SEVERITY: 7/10
BLURRED VISION: 1
PAIN LOCATION - PAIN SEVERITY: 7/10
APPETITE LEVEL: FAIR
PAIN LOCATION - PAIN FREQUENCY: FREQUENT
PERSON REPORTING PAIN: PATIENT
PAIN LOCATION - PAIN QUALITY: ACHE
LOWEST PAIN SEVERITY IN PAST 24 HOURS: 4/10
PAIN SEVERITY GOAL: 0/10
PAIN LOCATION: RIGHT ANKLE
PAIN LOCATION - PAIN FREQUENCY: FREQUENT
PAIN: 1

## 2024-10-11 ASSESSMENT — PAIN SCALES - PAIN ASSESSMENT IN ADVANCED DEMENTIA (PAINAD)
FACIALEXPRESSION: 0
CONSOLABILITY: 0
FACIALEXPRESSION: 0 - SMILING OR INEXPRESSIVE.
NEGVOCALIZATION: 0
BODYLANGUAGE: 0 - RELAXED.
CONSOLABILITY: 0 - NO NEED TO CONSOLE.
BODYLANGUAGE: 0
BREATHING: 0
TOTALSCORE: 0
NEGVOCALIZATION: 0 - NONE.

## 2024-10-11 NOTE — PROGRESS NOTES
"Subjective   Patient ID: Manolo Bashir is a 69 y.o. male who presents for Follow-up.    HPI   Patient with PMH of ESRD, on HD [id number], Renal transplant on 3/23/1994 failed in 2006, followed by hemodialysis and a second transplant 7/13/2013 now being evaluated for the transplant list again, HTN, DM2, h/o Hep C with treatment, prostate CA s/p radical prostatectomy.   Since visit with me in July he developed diarrhea, dry cough, and I had him go to the ED 8/24. He was admitted with a diagnosis of possible pneumonia. He was discharged on 8/31 only to return to the ED on 9/10 and diagnosed with bacterial pneumonia with bilateral plural effusions. He was discharged on 9/13. He is being followed by home health care.   Today I reviewed the medications from the hospital discharge discussing relevant changes. He reports taking the medications as prescribed on discharge. He has resumed his community HD.  Overall he is feeling better, Ontinues to have fatigue, weights are stable. His Systolic BP today was elevated,  but he reports it is common for him to have more elevated Bps in the AM.   He reports that he is taking the tacrolimus 0.5mg every 12 hours.   Next followup in [facility] on 10/13/24.    Review of Systems  12 point review of systems including (Constitutional, Eyes, ENMT, Respiratory, Cardiac, Gastrointestinal, Neurological, Psychiatric, and Hematologic) was performed and is otherwise negative     Objective   /74   Pulse 63   Temp 36.6 °C (97.9 °F)   Resp 16   Ht 1.727 m (5' 8\")   Wt 70.8 kg (156 lb)   SpO2 100%   BMI 23.72 kg/m²     Physical Exam  General: Well groomed. Mood appropriate.  HEENT: MMM   Chest: CTA  Heart: RRR  Ext: no edema  MS: full ROM and full strength upper and lower extremities.  Skin: warm, moist, intact     Assessment/Plan   Diagnoses and all orders for this visit:  Pneumonia of both lungs due to infectious organism, unspecified part of lung   Lungs clear today sohail without crackles " or rhonchi.   ESRD (end stage renal disease) on dialysis (Multi)   Continue on dialysis  Diabetes mellitus type 2 without retinopathy (Multi)   Confirmed adjustment in Insulin glargine to 24 Units upon hospital discharge.  Chronic heart failure with preserved ejection fraction   No s/s of decompensation, weight stable, no LE edema, Lungs clear.  Essential hypertension   Systolic elevation today.   Other orders  -     Follow Up In Primary Care - Established; Future

## 2024-10-12 ENCOUNTER — PHARMACY VISIT (OUTPATIENT)
Dept: PHARMACY | Facility: CLINIC | Age: 69
End: 2024-10-12
Payer: MEDICAID

## 2024-10-13 PROBLEM — N18.6 ESRD (END STAGE RENAL DISEASE) ON DIALYSIS (MULTI): Status: ACTIVE | Noted: 2024-01-16

## 2024-10-13 PROBLEM — Z99.2 ESRD (END STAGE RENAL DISEASE) ON DIALYSIS (MULTI): Status: ACTIVE | Noted: 2024-01-16

## 2024-10-13 PROBLEM — R05.9 COUGH: Status: RESOLVED | Noted: 2024-02-12 | Resolved: 2024-10-13

## 2024-10-13 PROBLEM — R09.02 HYPOXIA: Status: RESOLVED | Noted: 2024-02-12 | Resolved: 2024-10-13

## 2024-10-13 PROBLEM — J18.9 PNEUMONIA OF BOTH LUNGS DUE TO INFECTIOUS ORGANISM, UNSPECIFIED PART OF LUNG: Status: ACTIVE | Noted: 2023-08-20

## 2024-10-14 ENCOUNTER — TELEPHONE (OUTPATIENT)
Dept: HEMATOLOGY/ONCOLOGY | Facility: CLINIC | Age: 69
End: 2024-10-14
Payer: COMMERCIAL

## 2024-10-14 ENCOUNTER — LAB (OUTPATIENT)
Dept: LAB | Facility: LAB | Age: 69
End: 2024-10-14
Payer: COMMERCIAL

## 2024-10-14 ENCOUNTER — APPOINTMENT (OUTPATIENT)
Dept: HEMATOLOGY/ONCOLOGY | Facility: CLINIC | Age: 69
End: 2024-10-14
Payer: COMMERCIAL

## 2024-10-14 ENCOUNTER — PATIENT OUTREACH (OUTPATIENT)
Dept: CARE COORDINATION | Facility: CLINIC | Age: 69
End: 2024-10-14
Payer: COMMERCIAL

## 2024-10-14 ENCOUNTER — HOME CARE VISIT (OUTPATIENT)
Dept: HOME HEALTH SERVICES | Facility: HOME HEALTH | Age: 69
End: 2024-10-14
Payer: COMMERCIAL

## 2024-10-14 VITALS
HEART RATE: 61 BPM | DIASTOLIC BLOOD PRESSURE: 60 MMHG | SYSTOLIC BLOOD PRESSURE: 120 MMHG | OXYGEN SATURATION: 99 % | RESPIRATION RATE: 16 BRPM | TEMPERATURE: 97.3 F

## 2024-10-14 DIAGNOSIS — D47.2 MGUS (MONOCLONAL GAMMOPATHY OF UNKNOWN SIGNIFICANCE): ICD-10-CM

## 2024-10-14 LAB
ALBUMIN SERPL BCP-MCNC: 3.9 G/DL (ref 3.4–5)
ALP SERPL-CCNC: 96 U/L (ref 33–136)
ALT SERPL W P-5'-P-CCNC: 14 U/L (ref 10–52)
ANION GAP SERPL CALC-SCNC: 16 MMOL/L (ref 10–20)
AST SERPL W P-5'-P-CCNC: 19 U/L (ref 9–39)
BASOPHILS # BLD AUTO: 0.03 X10*3/UL (ref 0–0.1)
BASOPHILS NFR BLD AUTO: 0.8 %
BILIRUB SERPL-MCNC: 0.4 MG/DL (ref 0–1.2)
BUN SERPL-MCNC: 44 MG/DL (ref 6–23)
CALCIUM SERPL-MCNC: 9.6 MG/DL (ref 8.6–10.6)
CHLORIDE SERPL-SCNC: 106 MMOL/L (ref 98–107)
CO2 SERPL-SCNC: 28 MMOL/L (ref 21–32)
CREAT SERPL-MCNC: 6.77 MG/DL (ref 0.5–1.3)
EGFRCR SERPLBLD CKD-EPI 2021: 8 ML/MIN/1.73M*2
EOSINOPHIL # BLD AUTO: 0.17 X10*3/UL (ref 0–0.7)
EOSINOPHIL NFR BLD AUTO: 4.5 %
ERYTHROCYTE [DISTWIDTH] IN BLOOD BY AUTOMATED COUNT: 15.9 % (ref 11.5–14.5)
GLUCOSE SERPL-MCNC: 111 MG/DL (ref 74–99)
HCT VFR BLD AUTO: 35.1 % (ref 41–52)
HGB BLD-MCNC: 11 G/DL (ref 13.5–17.5)
IGA SERPL-MCNC: 867 MG/DL (ref 70–400)
IGG SERPL-MCNC: 1400 MG/DL (ref 700–1600)
IGM SERPL-MCNC: 28 MG/DL (ref 40–230)
IMM GRANULOCYTES # BLD AUTO: 0.02 X10*3/UL (ref 0–0.7)
IMM GRANULOCYTES NFR BLD AUTO: 0.5 % (ref 0–0.9)
LYMPHOCYTES # BLD AUTO: 0.56 X10*3/UL (ref 1.2–4.8)
LYMPHOCYTES NFR BLD AUTO: 14.9 %
MCH RBC QN AUTO: 28.6 PG (ref 26–34)
MCHC RBC AUTO-ENTMCNC: 31.3 G/DL (ref 32–36)
MCV RBC AUTO: 91 FL (ref 80–100)
MONOCYTES # BLD AUTO: 0.5 X10*3/UL (ref 0.1–1)
MONOCYTES NFR BLD AUTO: 13.3 %
NEUTROPHILS # BLD AUTO: 2.49 X10*3/UL (ref 1.2–7.7)
NEUTROPHILS NFR BLD AUTO: 66 %
NRBC BLD-RTO: 0 /100 WBCS (ref 0–0)
PLATELET # BLD AUTO: 96 X10*3/UL (ref 150–450)
POTASSIUM SERPL-SCNC: 4.9 MMOL/L (ref 3.5–5.3)
PROT SERPL-MCNC: 7.2 G/DL (ref 6.4–8.2)
PROT SERPL-MCNC: 7.2 G/DL (ref 6.4–8.2)
RBC # BLD AUTO: 3.85 X10*6/UL (ref 4.5–5.9)
SODIUM SERPL-SCNC: 145 MMOL/L (ref 136–145)
WBC # BLD AUTO: 3.8 X10*3/UL (ref 4.4–11.3)

## 2024-10-14 PROCEDURE — 85025 COMPLETE CBC W/AUTO DIFF WBC: CPT

## 2024-10-14 PROCEDURE — 36415 COLL VENOUS BLD VENIPUNCTURE: CPT

## 2024-10-14 PROCEDURE — 84165 PROTEIN E-PHORESIS SERUM: CPT

## 2024-10-14 PROCEDURE — 80053 COMPREHEN METABOLIC PANEL: CPT

## 2024-10-14 PROCEDURE — G0299 HHS/HOSPICE OF RN EA 15 MIN: HCPCS

## 2024-10-14 PROCEDURE — 86334 IMMUNOFIX E-PHORESIS SERUM: CPT

## 2024-10-14 PROCEDURE — 82784 ASSAY IGA/IGD/IGG/IGM EACH: CPT

## 2024-10-14 PROCEDURE — 84155 ASSAY OF PROTEIN SERUM: CPT

## 2024-10-14 PROCEDURE — 83521 IG LIGHT CHAINS FREE EACH: CPT

## 2024-10-14 ASSESSMENT — ACTIVITIES OF DAILY LIVING (ADL)
OASIS_M1830: 00
HOME_HEALTH_OASIS: 00

## 2024-10-14 ASSESSMENT — ENCOUNTER SYMPTOMS: PAIN: 1

## 2024-10-14 NOTE — PROGRESS NOTES
Outreach call to patient to follow up after 30 days of hospital discharge. No answer, unable to reach pt.  Erika Dawkins RN [facility]  [phone]

## 2024-10-14 NOTE — TELEPHONE ENCOUNTER
Patient did not get labs done prior to virtual visit today.  I called patient he stated he forgot.     He will go today to [facility] and have labs drawn.   Jose will call him to reschedule appt.    Patient has diaysis T TH S. So Friday or Monday would work best

## 2024-10-15 ENCOUNTER — APPOINTMENT (OUTPATIENT)
Dept: HEMATOLOGY/ONCOLOGY | Facility: CLINIC | Age: 69
End: 2024-10-15
Payer: COMMERCIAL

## 2024-10-15 LAB
KAPPA LC SERPL-MCNC: 18.57 MG/DL (ref 0.33–1.94)
KAPPA LC/LAMBDA SER: 1.1 {RATIO} (ref 0.26–1.65)
LAMBDA LC SERPL-MCNC: 16.9 MG/DL (ref 0.57–2.63)

## 2024-10-16 ENCOUNTER — APPOINTMENT (OUTPATIENT)
Dept: DERMATOLOGY | Facility: CLINIC | Age: 69
End: 2024-10-16
Payer: COMMERCIAL

## 2024-10-16 DIAGNOSIS — A63.0 CONDYLOMA: Primary | ICD-10-CM

## 2024-10-16 LAB
ALBUMIN: 3.9 G/DL (ref 3.4–5)
ALPHA 1 GLOBULIN: 0.3 G/DL (ref 0.2–0.6)
ALPHA 2 GLOBULIN: 0.5 G/DL (ref 0.4–1.1)
BETA GLOBULIN: 1.4 G/DL (ref 0.5–1.2)
GAMMA GLOBULIN: 1.1 G/DL (ref 0.5–1.4)
IMMUNOFIXATION COMMENT: ABNORMAL
M-PROTEIN 1: 0.4 G/DL
M-PROTEIN 2: 0.2 G/DL
M-PROTEIN 3: 0.1 G/DL
PATH REVIEW - SERUM IMMUNOFIXATION: ABNORMAL
PATH REVIEW-SERUM PROTEIN ELECTROPHORESIS: ABNORMAL
PROTEIN ELECTROPHORESIS COMMENT: ABNORMAL

## 2024-10-16 PROCEDURE — 1159F MED LIST DOCD IN RCRD: CPT | Performed by: STUDENT IN AN ORGANIZED HEALTH CARE EDUCATION/TRAINING PROGRAM

## 2024-10-16 PROCEDURE — 3044F HG A1C LEVEL LT 7.0%: CPT | Performed by: STUDENT IN AN ORGANIZED HEALTH CARE EDUCATION/TRAINING PROGRAM

## 2024-10-16 PROCEDURE — 99214 OFFICE O/P EST MOD 30 MIN: CPT | Performed by: STUDENT IN AN ORGANIZED HEALTH CARE EDUCATION/TRAINING PROGRAM

## 2024-10-16 PROCEDURE — 3062F POS MACROALBUMINURIA REV: CPT | Performed by: STUDENT IN AN ORGANIZED HEALTH CARE EDUCATION/TRAINING PROGRAM

## 2024-10-16 RX ORDER — IMIQUIMOD 12.5 MG/.25G
1 CREAM TOPICAL 3 TIMES WEEKLY
Qty: 12 PACKET | Refills: 3 | Status: SHIPPED | OUTPATIENT
Start: 2024-10-16 | End: 2025-10-16

## 2024-10-16 ASSESSMENT — DERMATOLOGY QUALITY OF LIFE (QOL) ASSESSMENT
RATE HOW EMOTIONALLY BOTHERED YOU ARE BY YOUR SKIN PROBLEM (FOR EXAMPLE, WORRY, EMBARRASSMENT, FRUSTRATION): 1
ARE THERE EXCLUSIONS OR EXCEPTIONS FOR THE QUALITY OF LIFE ASSESSMENT: NO
RATE HOW BOTHERED YOU ARE BY SYMPTOMS OF YOUR SKIN PROBLEM (EG, ITCHING, STINGING BURNING, HURTING OR SKIN IRRITATION): 1
DATE THE QUALITY-OF-LIFE ASSESSMENT WAS COMPLETED: 67129
RATE HOW BOTHERED YOU ARE BY EFFECTS OF YOUR SKIN PROBLEMS ON YOUR ACTIVITIES (EG, GOING OUT, ACCOMPLISHING WHAT YOU WANT, WORK ACTIVITIES OR YOUR RELATIONSHIPS WITH OTHERS): 1

## 2024-10-16 ASSESSMENT — PATIENT GLOBAL ASSESSMENT (PGA): PATIENT GLOBAL ASSESSMENT: PATIENT GLOBAL ASSESSMENT:  2 - MILD

## 2024-10-16 ASSESSMENT — DERMATOLOGY PATIENT ASSESSMENT
ARE YOU AN ORGAN TRANSPLANT RECIPIENT: YES
DO YOU USE A TANNING BED: NO
DO YOU HAVE ANY NEW OR CHANGING LESIONS: NO
HAVE YOU HAD OR DO YOU HAVE VASCULAR DISEASE: NO
HAVE YOU HAD OR DO YOU HAVE A STAPH INFECTION: NO

## 2024-10-16 ASSESSMENT — ITCH NUMERIC RATING SCALE: HOW SEVERE IS YOUR ITCHING?: 0

## 2024-10-16 NOTE — PROGRESS NOTES
Subjective     Manolo Bashir is a 69 y.o. male who presents for the following: Genital Warts (Penis. Patient stated that tracrolimus cream is not working and that he ran out; incontinent ).     Review of Systems:  No other skin or systemic complaints other than what is documented elsewhere in the note.    The following portions of the chart were reviewed this encounter and updated as appropriate:          Skin Cancer History  No skin cancer on file.      Specialty Problems          Dermatology Problems    Rash        Objective   Well appearing patient in no apparent distress; mood and affect are within normal limits.    A focused skin examination was performed. All findings within normal limits unless otherwise noted below.    Assessment/Plan   1. Condyloma  Severe  Extensive  Worsening  chronic  Pubic  Multiple light pink pedunculated papules and plaques of the ventral penis and scrotum          - Patient is immunosuppressed secondary to kidney transplant, reports warts of the penis and scrotum for the last year  - Start imiquimod cream three times/week  - RTC 6 weeks for re-evaluation    imiquimod (Aldara) 5 % cream - Pubic  Apply 1 packet topically 3 times a week.  S/e extensively reviewed to avoid local irritation    Related Procedures  Follow Up In Dermatology - Established Patient      Shandra Grant MD    I was present during all key portions of visit including history, exam, discussion/plan and/or procedures and directly supervised our resident during all portions of the visit, follow up care, medications and more    Vinicius Araiza MD

## 2024-10-17 PROCEDURE — RXMED WILLOW AMBULATORY MEDICATION CHARGE

## 2024-10-18 ENCOUNTER — APPOINTMENT (OUTPATIENT)
Dept: SLEEP MEDICINE | Facility: CLINIC | Age: 69
End: 2024-10-18
Payer: COMMERCIAL

## 2024-10-21 ENCOUNTER — TELEMEDICINE (OUTPATIENT)
Dept: HEMATOLOGY/ONCOLOGY | Facility: CLINIC | Age: 69
End: 2024-10-21
Payer: COMMERCIAL

## 2024-10-21 ENCOUNTER — TELEPHONE (OUTPATIENT)
Dept: HEMATOLOGY/ONCOLOGY | Facility: CLINIC | Age: 69
End: 2024-10-21
Payer: COMMERCIAL

## 2024-10-21 DIAGNOSIS — D63.8 ANEMIA OF CHRONIC DISEASE: ICD-10-CM

## 2024-10-21 DIAGNOSIS — D47.2 MGUS (MONOCLONAL GAMMOPATHY OF UNKNOWN SIGNIFICANCE): Primary | ICD-10-CM

## 2024-10-21 DIAGNOSIS — Z94.0 KIDNEY REPLACED BY TRANSPLANT (HHS-HCC): ICD-10-CM

## 2024-10-21 DIAGNOSIS — N18.6 ESRD (END STAGE RENAL DISEASE) (MULTI): ICD-10-CM

## 2024-10-21 PROCEDURE — 3044F HG A1C LEVEL LT 7.0%: CPT

## 2024-10-21 PROCEDURE — 99214 OFFICE O/P EST MOD 30 MIN: CPT

## 2024-10-21 PROCEDURE — 3062F POS MACROALBUMINURIA REV: CPT

## 2024-10-21 ASSESSMENT — ENCOUNTER SYMPTOMS
RESPIRATORY NEGATIVE: 1
GASTROINTESTINAL NEGATIVE: 1
NEUROLOGICAL NEGATIVE: 1
EYES NEGATIVE: 1
CARDIOVASCULAR NEGATIVE: 1
ENDOCRINE NEGATIVE: 1
FATIGUE: 1
HEMATOLOGIC/LYMPHATIC NEGATIVE: 1
PSYCHIATRIC NEGATIVE: 1

## 2024-10-21 NOTE — TELEPHONE ENCOUNTER
Attempted to call Manolo for his phone visit. Left VM with call back number to [facility] office and main number.

## 2024-10-21 NOTE — PROGRESS NOTES
Patient ID: Manolo Bashir is a 69 y.o. male.  Referring Physician: No referring provider defined for this encounter.  Primary Care Provider: ZAIRE Armas, JER    Date of Service:  10/21/2024    MGUS    Mr. Bashir is a 68 year old gentleman with complex medical history who is referred after recent hospitalization for abdominal pain for further evaluation of evolving pancytopenia.  History of anemia for several years treated with iron supplements, does not recall being told about abnormal blood counts until last few months.  He has not required transfusion support and denies any recent infections.  Upon reviewing his blood counts, he had worsening anemia (10-11 down to 7.4 g/dL) and platelets (200s down to 69K) over last 2-3 months.  He has had chronic lymphopenia but has not been neutropenic.  He complains predominantly of GI symptoms including abdominal pain, nausea, and vomiting for which he has been hospitalized 2x recently without clear etiology.     Workup:    SPEP (10/18/23): 0.2g/dL IgA Lambda M protein, 0.1g/dL IgG Kappa M protein  SFLC (10/19/23): 8.23mg/dL Kappa FLC, 6.40mg/dL Lambda FLC, FLC ratio 1.29  Immunoglobulins (10/20/23): IgA 523  Spot UPEP (11/16/23): Marked proteinuria, 2% IgA Lambda M protein, 1% IgG Lambda M protein    BMBx (10/26/23):    -- HYPERCELLULAR BONE MARROW (50%) WITH MATURING TRILINEAGE HEMATOPOIESIS AND INVOLVED (5%) BY PLASMA CELL NEOPLASM    FISH NEGATIVE for gain of 1q, hyperdiploidy of 3,7 and 11, rearrangement of IGH, and deletion of TP53     Osseous survey (11/19/23):  IMPRESSION:  1. No evidence of suspicious osteolytic lesion in the axial or  appendicular skeleton.  2. Large amount of lobulated soft tissue prominence in the radial  aspect of the left distal upper arm containing interrupted  calcifications. This may represent fistula for hemodialysis and  clinical correlation as well as physical examination suggested.  3. Prominent vascular calcifications of the  extremities    Medical History:  ESRD s/p renal transplant (1992, 2013 (left kidney))  HTN  DM 2  HCV      SUBJECTIVE:  History of Present Illness:  Mr. Bashir presents to clinic 10/21/24 for a follow up phone call.    Overall he is feeling okay.    Notes that he has some genital warts which are currently being treated.           Review of Systems   Constitutional:  Positive for fatigue.   HENT: Negative.     Eyes: Negative.    Respiratory: Negative.     Cardiovascular: Negative.    Gastrointestinal: Negative.    Endocrine: Negative.    Genitourinary: Negative.    Skin: Negative.    Allergic/Immunologic: Positive for immunocompromised state.   Neurological: Negative.    Hematological: Negative.    Psychiatric/Behavioral: Negative.       OBJECTIVE:  KPS: Karnofsky Score: 80 - Normal activity with effort; some signs or symptoms of disease   VS:  There were no vitals taken for this visit.  BSA: There is no height or weight on file to calculate BSA.    Laboratory:  The pertinent laboratory results were reviewed and discussed with the patient.    Lab Results   Component Value Date    WBC 3.8 (L) 10/14/2024    HCT 35.1 (L) 10/14/2024    HGB 11.0 (L) 10/14/2024    PLT 96 (L) 10/14/2024    ANC 3.44 05/18/2024    K 4.9 10/14/2024    CALCIUM 9.6 10/14/2024     10/14/2024    MG 2.05 09/13/2024    ALT 14 10/14/2024    AST 19 10/14/2024    BUN 44 (H) 10/14/2024    CREATININE 6.77 (H) 10/14/2024    PHOS 3.3 09/13/2024    KAPPA 18.57 (H) 10/14/2024    LAMBDA 16.90 (H) 10/14/2024    KAPLS 1.10 10/14/2024    SPEP Aberrant bands detected. See immunofixation. 10/14/2024    IEPIN  10/14/2024     Known monoclonal IgA lambda in the beta region at 0.4 g/dL, monoclonal IgG lambda in the gamma region at 0.2 g/dL, and monoclonal IgG kappa in the gamma region at 0.1 g/dL. Last detected on 3/5/24 at 0.2 g/dL, 0.1 g/dL, and 0.1 g/dL, respectively.    IGG 1,400 10/14/2024    IGM 28 (L) 10/14/2024     (H) 10/14/2024      Note: for a  comprehensive list of the patient's lab results, access the Results Review activity.    ASSESSMENT and PLAN:    MGUS:  - 3 M spikes found on workup for worsening pancytopenias: 0.2g/dL IgA Lambda, 0.1g/dL IgG Lambda, 0.1g/dL IgG Kappa  - Both LC elevated w/ normal FLC ratio  - IgA 523  - BMBx showed 5% plasma cells, consistent w/ MGUS  - Osseous survey negative  - Increase in LC and M proteins and IgA, may be related to recent infections  - Repeat labs in 6 months     Pancytopenia:  - Longstanding history of anemia (of chronic disease?), taking oral Iron  - Recent decrease in both Hgb and Plts  - Hgb dropped again, likely r/t kidney failure   - Cause of cytopenias not noted on BMBx  - Given IV iron in hospital, currently oral is on hold    Renal:  - S/p L kidney transplant  - sCr increasing lately, s/p recent kidney biopsy  -  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

## 2024-10-22 DIAGNOSIS — Z94.0 KIDNEY TRANSPLANTED (HHS-HCC): ICD-10-CM

## 2024-10-23 ENCOUNTER — PHARMACY VISIT (OUTPATIENT)
Dept: PHARMACY | Facility: CLINIC | Age: 69
End: 2024-10-23
Payer: MEDICAID

## 2024-10-23 PROCEDURE — RXMED WILLOW AMBULATORY MEDICATION CHARGE

## 2024-10-25 DIAGNOSIS — Z94.0 KIDNEY TRANSPLANTED (HHS-HCC): ICD-10-CM

## 2024-10-25 DIAGNOSIS — E21.3 HYPERPARATHYROIDISM (MULTI): ICD-10-CM

## 2024-10-28 ENCOUNTER — TELEPHONE (OUTPATIENT)
Dept: TRANSPLANT | Facility: HOSPITAL | Age: 69
End: 2024-10-28

## 2024-10-28 ENCOUNTER — APPOINTMENT (OUTPATIENT)
Dept: UROLOGY | Facility: CLINIC | Age: 69
End: 2024-10-28
Payer: COMMERCIAL

## 2024-10-28 VITALS — TEMPERATURE: 98.4 F

## 2024-10-28 DIAGNOSIS — C61 ADENOCARCINOMA OF PROSTATE (MULTI): Primary | ICD-10-CM

## 2024-10-28 DIAGNOSIS — N39.3 PRIMARY STRESS URINARY INCONTINENCE: ICD-10-CM

## 2024-10-28 PROCEDURE — 1126F AMNT PAIN NOTED NONE PRSNT: CPT | Performed by: STUDENT IN AN ORGANIZED HEALTH CARE EDUCATION/TRAINING PROGRAM

## 2024-10-28 PROCEDURE — 3062F POS MACROALBUMINURIA REV: CPT | Performed by: STUDENT IN AN ORGANIZED HEALTH CARE EDUCATION/TRAINING PROGRAM

## 2024-10-28 PROCEDURE — 4010F ACE/ARB THERAPY RXD/TAKEN: CPT | Performed by: STUDENT IN AN ORGANIZED HEALTH CARE EDUCATION/TRAINING PROGRAM

## 2024-10-28 PROCEDURE — 1159F MED LIST DOCD IN RCRD: CPT | Performed by: STUDENT IN AN ORGANIZED HEALTH CARE EDUCATION/TRAINING PROGRAM

## 2024-10-28 PROCEDURE — 1160F RVW MEDS BY RX/DR IN RCRD: CPT | Performed by: STUDENT IN AN ORGANIZED HEALTH CARE EDUCATION/TRAINING PROGRAM

## 2024-10-28 PROCEDURE — 1036F TOBACCO NON-USER: CPT | Performed by: STUDENT IN AN ORGANIZED HEALTH CARE EDUCATION/TRAINING PROGRAM

## 2024-10-28 PROCEDURE — 3044F HG A1C LEVEL LT 7.0%: CPT | Performed by: STUDENT IN AN ORGANIZED HEALTH CARE EDUCATION/TRAINING PROGRAM

## 2024-10-28 PROCEDURE — 99214 OFFICE O/P EST MOD 30 MIN: CPT | Performed by: STUDENT IN AN ORGANIZED HEALTH CARE EDUCATION/TRAINING PROGRAM

## 2024-10-28 RX ORDER — CARVEDILOL 25 MG/1
1 TABLET ORAL
COMMUNITY
Start: 2023-12-26

## 2024-10-28 RX ORDER — LOSARTAN POTASSIUM 25 MG/1
1 TABLET ORAL
COMMUNITY
Start: 2024-02-23

## 2024-10-28 ASSESSMENT — PAIN SCALES - GENERAL: PAINLEVEL_OUTOF10: 0-NO PAIN

## 2024-11-06 PROCEDURE — RXMED WILLOW AMBULATORY MEDICATION CHARGE

## 2024-11-07 ENCOUNTER — PHARMACY VISIT (OUTPATIENT)
Dept: PHARMACY | Facility: CLINIC | Age: 69
End: 2024-11-07
Payer: MEDICAID

## 2024-11-19 ENCOUNTER — PATIENT MESSAGE (OUTPATIENT)
Dept: TRANSPLANT | Facility: HOSPITAL | Age: 69
End: 2024-11-19
Payer: COMMERCIAL

## 2024-11-19 DIAGNOSIS — Z94.0 KIDNEY REPLACED BY TRANSPLANT (HHS-HCC): ICD-10-CM

## 2024-11-20 ENCOUNTER — APPOINTMENT (OUTPATIENT)
Dept: DERMATOLOGY | Facility: CLINIC | Age: 69
End: 2024-11-20
Payer: COMMERCIAL

## 2024-11-20 ENCOUNTER — PHARMACY VISIT (OUTPATIENT)
Dept: PHARMACY | Facility: CLINIC | Age: 69
End: 2024-11-20
Payer: MEDICAID

## 2024-11-20 DIAGNOSIS — L30.9 DERMATITIS: Primary | ICD-10-CM

## 2024-11-20 DIAGNOSIS — A63.0 CONDYLOMA: ICD-10-CM

## 2024-11-20 PROCEDURE — 54056 CRYOSURGERY PENIS LESION(S): CPT | Performed by: STUDENT IN AN ORGANIZED HEALTH CARE EDUCATION/TRAINING PROGRAM

## 2024-11-20 PROCEDURE — 99214 OFFICE O/P EST MOD 30 MIN: CPT | Performed by: STUDENT IN AN ORGANIZED HEALTH CARE EDUCATION/TRAINING PROGRAM

## 2024-11-20 PROCEDURE — 3062F POS MACROALBUMINURIA REV: CPT | Performed by: STUDENT IN AN ORGANIZED HEALTH CARE EDUCATION/TRAINING PROGRAM

## 2024-11-20 PROCEDURE — 3044F HG A1C LEVEL LT 7.0%: CPT | Performed by: STUDENT IN AN ORGANIZED HEALTH CARE EDUCATION/TRAINING PROGRAM

## 2024-11-20 PROCEDURE — RXMED WILLOW AMBULATORY MEDICATION CHARGE

## 2024-11-20 PROCEDURE — 1159F MED LIST DOCD IN RCRD: CPT | Performed by: STUDENT IN AN ORGANIZED HEALTH CARE EDUCATION/TRAINING PROGRAM

## 2024-11-20 PROCEDURE — 4010F ACE/ARB THERAPY RXD/TAKEN: CPT | Performed by: STUDENT IN AN ORGANIZED HEALTH CARE EDUCATION/TRAINING PROGRAM

## 2024-11-20 RX ORDER — CLOBETASOL PROPIONATE 0.5 MG/ML
SOLUTION TOPICAL 2 TIMES DAILY
Qty: 50 ML | Refills: 11 | Status: SHIPPED | OUTPATIENT
Start: 2024-11-20 | End: 2024-12-04

## 2024-11-20 NOTE — PROGRESS NOTES
Subjective     Manolo Bashir is a 69 y.o. male who presents for the following: condyloma (Follow up, according to patient they got a whole lot better.).     Review of Systems:  No other skin or systemic complaints other than what is documented elsewhere in the note.    The following portions of the chart were reviewed this encounter and updated as appropriate:          Skin Cancer History  No skin cancer on file.      Specialty Problems          Dermatology Problems    Rash        Objective   Well appearing patient in no apparent distress; mood and affect are within normal limits.    A focused skin examination was performed. All findings within normal limits unless otherwise noted below.    Assessment/Plan   1. Dermatitis  Mid Frontal Scalp  No evidence of skin disease on the scalp.   History of scalp pruritus    Clobetasol solution is very helpful in controlling his itch  renewed    Related Medications  tacrolimus (Protopic) 0.1 % ointment  Apply topically 2 times a day.    clobetasol (Temovate) 0.05 % external solution  Apply topically 2 times a day for 14 days. Use on scalp nightly if needed for itch    2. Condyloma  Pubic  Multiple light pink pedunculated papules and plaques of the ventral penis and scrotum  Overall 80% improved        S/p imiquimod cream    Recommend re-start 6 week course    Recommend vaseline ointment bid     Related Procedures  Follow Up In Dermatology - Established Patient    Related Medications  imiquimod (Aldara) 5 % cream  Apply 1 packet topically 3 times a week.    LN2 done to 10 lesions today ( 2 sessions, 8 seconds each, to 10 verrucous papules and plaques on ventral shaft)    Vaseline ointment bid given irritant dermatitis to urine leakage

## 2024-11-21 PROCEDURE — RXMED WILLOW AMBULATORY MEDICATION CHARGE

## 2024-11-21 RX ORDER — TACROLIMUS 0.5 MG/1
0.5 CAPSULE ORAL 2 TIMES DAILY
Qty: 60 CAPSULE | Refills: 11 | Status: SHIPPED | OUTPATIENT
Start: 2024-11-21 | End: 2025-11-21

## 2024-11-21 RX ORDER — PREDNISONE 5 MG/1
5 TABLET ORAL DAILY
Qty: 10 TABLET | Refills: 0 | Status: SHIPPED | OUTPATIENT
Start: 2024-11-21 | End: 2024-12-01

## 2024-11-23 PROCEDURE — RXMED WILLOW AMBULATORY MEDICATION CHARGE

## 2024-11-26 ENCOUNTER — DOCUMENTATION (OUTPATIENT)
Dept: TRANSPLANT | Facility: HOSPITAL | Age: 69
End: 2024-11-26
Payer: COMMERCIAL

## 2024-11-26 ENCOUNTER — PHARMACY VISIT (OUTPATIENT)
Dept: PHARMACY | Facility: CLINIC | Age: 69
End: 2024-11-26
Payer: MEDICAID

## 2024-11-26 NOTE — PROGRESS NOTES
Kidney Patient Summary    Date of appointment: 2024    Name: Manolo Bashir    : 1955    MRN: [id number]    Diagnosis: Retransplant, HTN    ABO: O     Phase: Evaluation  Status: Active    Center Waitlist Date:       Last Seen: 2024  Referring Nephrologist:       HD Unit: [facility]  Dialysis Start: 2024  Access:   LUE AVG    Days:  TTS    PCP: Elma Hutton, APRN-CNP, DNP       Endocrinologist:     BMI Readings from Last 1 Encounters:   10/02/24 23.72 kg/m²     Blood Transfusion:    Medical History/Hospitalizations:   Other PMH: MGUS with IgG and IgA lambda (bone marrow bx 10/2023 with no plasma cell dyscrasia), DM2, HTN, prostate cancer s/p radical prostatectomy, baseline urinary incontinence, HCV s/p treatment (recent HCV PCR negative 2024).   Past Medical History:   Diagnosis Date    Anemia     Arthritis     Cataract     Chronic kidney disease, stage 3 unspecified (Multi) 2018    Stage 3 chronic kidney disease    CKD (chronic kidney disease)     stage V    Cough 2024    COVID-19 2020    COVID-19 virus infection    Diabetes (Multi)     ESRD (end stage renal disease) (Multi)     Focal and segmental proliferative glomerulonephritis 2023    HTN (hypertension)     Hyperlipidemia     Other long term (current) drug therapy 2021    High risk medication use    Personal history of other diseases of the circulatory system     Personal history of cardiac murmur    Personal history of other infectious and parasitic diseases 2015    History of hepatitis    Polyp, colonic 2023    Primary osteoarthritis of both ankles 2023    Prostate cancer (Multi)     Tubular adenoma of colon 2023    Unspecified kidney failure 2016    Renal failure       Surgical History:   Past Surgical History:   Procedure Laterality Date    ILEOSTOMY  2017    Ileostomy    ILEOSTOMY CLOSURE  2015    Ileostomy Closure    OTHER SURGICAL HISTORY  2017    Right  Hemicolectomy    OTHER SURGICAL HISTORY  08/17/2015    Arteriovenous Surgery Creation Of A-V Fistula    OTHER SURGICAL HISTORY  08/17/2015    Sigmoidoscopy (Fiberoptic, Therapeutic )    PROSTATECTOMY  10/11/2013    Prostatectomy Radical    TRANSPLANT, KIDNEY, OPEN  1992    TRANSPLANT, KIDNEY, OPEN  2013    US GUIDED PERCUTANEOUS BIOPSY RENAL LEFT Left 11/20/2023    US GUIDED PERCUTANEOUS BIOPSY RENAL LEFT 11/20/2023 Lou Rodgers MD [facility]    US GUIDED PERCUTANEOUS PERITONEAL OR RETROPERITONEAL FLUID COLLECTION DRAINAGE  10/20/2022    US GUIDED PERCUTANEOUS PERITONEAL OR RETROPERITONEAL FLUID COLLECTION DRAINAGE 10/20/2022 [facility] CLINICAL LEGACY     Donors:     Staff:  Nephrologist: Sariah   Surgeon: Marco    : Mariana     Testing Date:     Serologies:    CMV:     Reactive (A; Ref range: Nonreactive)  EBV Panel:  DANIEL-ARIAS VCA IGG:   Positive   DANIEL-ARIAS VCA IGM: Negative   EBV EARLY ANTIGEN ANTIBODY, IGG: Positive   EPSTIEN-BARR NUCLEAR ANTIGEN AB: Positive   Tox:    AMPHETAMINE SCREEN BLOOD: Negative   METHAMPHETAMINE, BLOOD, SCREEN: Negative   BENZODIAZEPINES SCREEN BLOOD: Negative   BUPRENORPHINE SCREEN BLOOD: Negative   CANNABINOIDS SCREEN BLOOD: Negative   COCAINE SCREEN BLOOD: Negative   METHADONE SCREEN BLOOD: Negative   OXYCODONE SCREEN BLOOD: Negative   PHENCYCLIDINE SCREEN BLOOD: Negative   OPIATE SCREEN BLOOD: Negative   DRUG SCREEN COMMENT BLOOD: See Note   BARBITURATE SCREEN BLOOD: Negative   Cotinine: <5; <5  HBV ag:   Nonreactive  HBV ab:   4.7  HBV c:     Nonreactive  HCV:        Reactive  HIV:    Nonreactive  RPR:    Nonreactive  TSpot:   Negative   VZV:   VARICELLA ZOSTER IGG: Positive   VARICELLA ZOSTER IGG INDEX: 4.2   A1C:       HEMOGLOBIN A1C/HEMOGLOBIN TOTAL IN BLOOD: 6.6   ESTIMATED AVERAGE GLUCOSE FOR HBA1C: 143   CPep:  4.4  PSA:    <0.1  Other:     Test/Consult Impression Next Scheduled Date   CXR XR chest 2 views    Result Date: 9/10/2024  1. Slightly improved  pulmonary interstitial edema. 2. Interval improvement, of right basilar opacity and associated air bronchograms. Finding is consistent with pneumonia in the proper clinical setting.   I personally reviewed the images/study and I agree with the findings as stated by Laurie Pan MD (resident). This study was interpreted at [facility], [place], [place].   MACRO: None   Signed by: James Prasad 9/10/2024 2:42 PM Dictation workstation:   [id number]       EKG Poor data quality, interpretation may be adversely affected  Normal sinus rhythm  Nonspecific intraventricular block  Minimal voltage criteria for LVH, may be normal variant ( Scarbro product )  Abnormal ECG  When compared with ECG of 27-AUG-2024 16:45,  Nonspecific intraventricular block has replaced Incomplete left bundle branch block    Echo 4/2024    CONCLUSIONS:   1. Left ventricular systolic function is normal with a 54% estimated ejection fraction.   2. Spectral Doppler shows a pseudonormal pattern of left ventricular diastolic filling.   3. Left ventricular cavity size is severely dilated.   4. Moderately increased left ventricular septal thickness.   5. There is severe left ventricular hypertrophy.   6. Mild to moderate aortic valve regurgitation.   7. The left atrium is severely dilated.   8. The right atrium is severely dilated.   9. Mildly elevated RVSP.  10. Compared with study from 11/18/2023, the LV is now severely dilated. The degree of aortic regurgitation appears unchanged.    CT Cardiac Score CT cardiac scoring wo IV contrast    Result Date: 9/23/2024  1. Coronary artery calcium score of 31*. 2. Ascending aortic aneurysm measuring4 cm on axial images at the level of the main pulmonary artery. Recommend follow-up radiation sparing cardiac MRI and thoracic MRA to assess status of the aortic valve and size and extent of the aneurysm in approximately 12 months. 3. Dilatation of the main pulmonary artery  measuring at 3.2 cm. Concern/correlate with elevated right-sided filling pressures 4. Bilateral pleural effusions, right- greater than-left which is improved compared to a prior CT performed May 2024. There is substantial improvement in diffuse lung opacity seen mostly in the right lung fields compared to the prior CT performed in May.   *Coronary Artery  Agatston score   Score  risk Very low 1-99 Mildly increased 100-299 Moderately increased >300 Moderate to severely increased >800   Cecilio et al. JCCT 2016 (http://dx.[email or web address]/10.1016/j.[email or web address])   IZAGUIRRE 10-Year CHD Risk with Coronary Artery Calcification can be calcuate using link below [email or web address] Mary Ann real al. JACC 2015 ([email or web address].[email or web address]/10.1016/[email or web address] [email or web address])   Signed by: Kaveh Fritz 9/23/2024 3:21 PM Dictation workstation:   [id number]       NM Stress Test No results found.     Cardiac MRI MR cardiac w and wo IV contrast w regadenoson stress for MORPH FUNCT and valve DZ    Result Date: 4/30/2024  1. The left ventricle is dilated (EDVi-147 ml/m2) and has overall low normal global systolic function(LVEF-54 %).  There are no major wall motion abnormalities. 2. Normal myocardial perfusion at rest and post stress without evidence of inducible ischemia. 3. Linear mid myocardial delayed enhancement within the basal inferolateral wall in a nonischemic pattern. Questionable thin mid myocardial delayed enhancement within the basal septum on the short axis images is less conspicuous on the other imaging planes and is favored to be artifactual, although a small area of nonischemic scarring could appear similar. 4. Circumferential left ventricular hypertrophy, most prominent in the base. 5. Mild qualitative mitral regurgitation. Regurgitant fraction = 3%. 6. Biatrial dilatation. 7. Moderate right and small left pleural effusion, similar to recent CT chest 04/03/2024. Areas of signal  abnormality within the lung fields may represent edema or infection. 8. Borderline dilated main pulmonary artery which can be seen with pulmonary hypertension.   Signed by: Luis Collado 4/30/2024 4:55 PM Dictation workstation:   [id number]       Left Heart Catheterization No results found.     Cardiology last visit impression  Pau 9/2024  Assessment:  69 y.o. male, with history significant for kidney transplant x2, HTN, HLD, HTN, hypertensive heart disease, HFpEF, mild dilation of ascending aorta, T2DM, GERD, ESRD on hemodialysis, who visits Cardiology today as an initial assessment for kidney pre-transplant evaluation. He is denies CV symptoms and has normal exercise capacity for age. Workup showed normal LVEF with hypertensive heart disease and mild RVSP elevation, low calcium score and stress MRI with no ischemia, mild non ischemic inferolateral scar and normal LVEF.     Assessment & Plan  Pre-transplant evaluation for kidney transplant  - Given the patient's clinical assessment, there is no contraindication to list for kidney transplant from the cardiac standpoint.   - Cardiology evaluation every 2 years with nuclear stress test is recommended until transplanted  Essential hypertension  - Appears well controlled, recommended to continue scheme  Hyperlipidemia, unspecified hyperlipidemia type  - With statin treatment and last LDL <70  - Recommended to continue pravastatin and yearly LDL  Chronic heart failure with preserved ejection fraction  - Well treated, dialysis dependent  - No further study required    Pulmonary Function Test No results found. However, due to the size of the patient record, not all encounters were searched. Please check Results Review for a complete set of results.    CT Abd/Pelvis CT abdomen pelvis wo IV contrast    Result Date: 9/10/2024  1. No acute findings in the abdomen or pelvis. No free air or bowel obstruction. 2. Moderate rectal stool. Correlate for constipation. 3. Pleural  effusions with basilar atelectasis. 4. Nonspecific mild bladder wall thickening which is unchanged. Correlate for cystitis. Signed by Gus Wheeler MD      Colonoscopy 8/2023  Diverticulosis, no specimens  Repeat in 7 years    Pap N/A    Mammogram N/A    Other:

## 2024-11-28 PROCEDURE — RXMED WILLOW AMBULATORY MEDICATION CHARGE

## 2024-12-02 ENCOUNTER — APPOINTMENT (OUTPATIENT)
Dept: PODIATRY | Facility: CLINIC | Age: 69
End: 2024-12-02
Payer: COMMERCIAL

## 2024-12-02 DIAGNOSIS — E11.9 DIABETES MELLITUS TYPE 2 WITHOUT RETINOPATHY (MULTI): Primary | ICD-10-CM

## 2024-12-02 DIAGNOSIS — M25.571 SINUS TARSI SYNDROME OF BOTH ANKLES: ICD-10-CM

## 2024-12-02 DIAGNOSIS — I73.9 PERIPHERAL VASCULAR DISEASE (CMS-HCC): ICD-10-CM

## 2024-12-02 DIAGNOSIS — M25.572 SINUS TARSI SYNDROME OF BOTH ANKLES: ICD-10-CM

## 2024-12-02 PROCEDURE — 4010F ACE/ARB THERAPY RXD/TAKEN: CPT | Performed by: PODIATRIST

## 2024-12-02 PROCEDURE — 1159F MED LIST DOCD IN RCRD: CPT | Performed by: PODIATRIST

## 2024-12-02 PROCEDURE — 99214 OFFICE O/P EST MOD 30 MIN: CPT | Performed by: PODIATRIST

## 2024-12-02 PROCEDURE — 3046F HEMOGLOBIN A1C LEVEL >9.0%: CPT | Performed by: PODIATRIST

## 2024-12-02 PROCEDURE — 20551 NJX 1 TENDON ORIGIN/INSJ: CPT | Performed by: PODIATRIST

## 2024-12-02 PROCEDURE — 1160F RVW MEDS BY RX/DR IN RCRD: CPT | Performed by: PODIATRIST

## 2024-12-02 PROCEDURE — 3048F LDL-C <100 MG/DL: CPT | Performed by: PODIATRIST

## 2024-12-02 PROCEDURE — 1036F TOBACCO NON-USER: CPT | Performed by: PODIATRIST

## 2024-12-02 NOTE — PROGRESS NOTES
History of Present Illness:   Patient states they are here for Dm exam  Denies NTB to feet  Has pain to bl ankle joints  Looking for an injection both sides    Past Medical History  Past Medical History:   Diagnosis Date    Chronic kidney disease, stage 3 unspecified (CMS/HCC) 09/26/2018    Stage 3 chronic kidney disease    COVID-19 06/18/2020    COVID-19 virus infection    Diabetes (CMS/HCC)     Focal and segmental proliferative glomerulonephritis 12/23/2023    HTN (hypertension)     Other long term (current) drug therapy 07/20/2021    High risk medication use    Personal history of other diseases of the circulatory system     Personal history of cardiac murmur    Personal history of other infectious and parasitic diseases 08/17/2015    History of hepatitis    Polyp, colonic 08/17/2023    Primary osteoarthritis of both ankles 08/17/2023    Tubular adenoma of colon 08/17/2023    Unspecified kidney failure 08/17/2016    Renal failure       Medications and Allergies have been reviewed.    Review Of Systems:  GENERAL: No weight loss, malaise or fevers.  HEENT: Negative for frequent or significant headaches,   RESPIRATORY: Negative for cough, wheezing or shortness of breath.  CARDIOVASCULAR: Negative for chest pain, leg swelling or palpitations.    Physical Exam:  Examination of Both Lower Extremities:   Vascular  CFT is 3 seconds to hallux bilaterally. No edema or varicosities noted. Hair growth is noted to the digits bilaterally.      Neurology  Gross sensation intact to bilateral foot.     Dermatology  Nails 1-5 bilaterally are within normal limits of length. No hyperkeratotic tissue noted. No subcutaneous nodules or rashes noted. No open lesions noted. Diffuse scales noted to plantar foot b/l.      Musculoskeletal  Muscle strength is 5/5 for plantarflexors, dorsiflexors, everters, and inverters bilaterally. Ankle joint ROM is decreased in dorsiflexion with the knee extended and flexed, with pain and crepitus  bilaterally. Pain on palpation to sinus tarsi and along dorsal ankle. + intrinsic muscle strength to digits 2-4 b/l.     1. Diabetes mellitus type 2 without retinopathy (Multi)  Disability Placard    dexAMETHasone (Decadron) injection 16 mg    triamcinolone acetonide (Kenalog-40) injection 80 mg      2. Peripheral vascular disease (CMS-[facility])  Disability Placard    dexAMETHasone (Decadron) injection 16 mg    triamcinolone acetonide (Kenalog-40) injection 80 mg      3. Sinus tarsi syndrome of both ankles  dexAMETHasone (Decadron) injection 16 mg    triamcinolone acetonide (Kenalog-40) injection 80 mg        Patient educated on proper diabetic foot care.  Nails 1-5 b/l were debrided in thickness and length with nail cutting forceps.  Gave disability placard  Gave bl ankle injection to bl sinus tarsi. Tolerated well  A1C revd. 9.1 Dec 2024  All hpk tissue was debrided to smooth skin  Patient to follow up in 6 mos or sooner if any problems arise.   Patient was in agreement to this plan. All questions answered.      Daxa Cote DPM  [phone]  Option 2  Fax: [phone]

## 2024-12-04 ENCOUNTER — PHARMACY VISIT (OUTPATIENT)
Dept: PHARMACY | Facility: CLINIC | Age: 69
End: 2024-12-04
Payer: MEDICAID

## 2024-12-04 ENCOUNTER — OFFICE VISIT (OUTPATIENT)
Dept: PRIMARY CARE | Facility: CLINIC | Age: 69
End: 2024-12-04
Payer: COMMERCIAL

## 2024-12-04 VITALS
TEMPERATURE: 97.4 F | HEIGHT: 68 IN | DIASTOLIC BLOOD PRESSURE: 87 MMHG | HEART RATE: 66 BPM | SYSTOLIC BLOOD PRESSURE: 133 MMHG | RESPIRATION RATE: 16 BRPM | OXYGEN SATURATION: 100 % | WEIGHT: 163 LBS | BODY MASS INDEX: 24.71 KG/M2

## 2024-12-04 DIAGNOSIS — I10 ESSENTIAL HYPERTENSION: Primary | ICD-10-CM

## 2024-12-04 DIAGNOSIS — E11.9 TYPE 2 DIABETES MELLITUS WITHOUT COMPLICATION, WITH LONG-TERM CURRENT USE OF INSULIN (MULTI): ICD-10-CM

## 2024-12-04 DIAGNOSIS — E11.9 DIABETES MELLITUS TYPE 2 WITHOUT RETINOPATHY (MULTI): ICD-10-CM

## 2024-12-04 DIAGNOSIS — Z99.2 ESRD (END STAGE RENAL DISEASE) ON DIALYSIS (MULTI): ICD-10-CM

## 2024-12-04 DIAGNOSIS — Z79.4 TYPE 2 DIABETES MELLITUS WITHOUT COMPLICATION, WITH LONG-TERM CURRENT USE OF INSULIN (MULTI): ICD-10-CM

## 2024-12-04 DIAGNOSIS — N18.6 ESRD (END STAGE RENAL DISEASE) ON DIALYSIS (MULTI): ICD-10-CM

## 2024-12-04 PROCEDURE — 99214 OFFICE O/P EST MOD 30 MIN: CPT | Performed by: NURSE PRACTITIONER

## 2024-12-04 PROCEDURE — 3008F BODY MASS INDEX DOCD: CPT | Performed by: NURSE PRACTITIONER

## 2024-12-04 PROCEDURE — 3075F SYST BP GE 130 - 139MM HG: CPT | Performed by: NURSE PRACTITIONER

## 2024-12-04 PROCEDURE — 3079F DIAST BP 80-89 MM HG: CPT | Performed by: NURSE PRACTITIONER

## 2024-12-04 PROCEDURE — 3062F POS MACROALBUMINURIA REV: CPT | Performed by: NURSE PRACTITIONER

## 2024-12-04 PROCEDURE — 3048F LDL-C <100 MG/DL: CPT | Performed by: NURSE PRACTITIONER

## 2024-12-04 PROCEDURE — 3046F HEMOGLOBIN A1C LEVEL >9.0%: CPT | Performed by: NURSE PRACTITIONER

## 2024-12-04 PROCEDURE — 4010F ACE/ARB THERAPY RXD/TAKEN: CPT | Performed by: NURSE PRACTITIONER

## 2024-12-04 PROCEDURE — 1126F AMNT PAIN NOTED NONE PRSNT: CPT | Performed by: NURSE PRACTITIONER

## 2024-12-04 ASSESSMENT — PAIN SCALES - GENERAL: PAINLEVEL_OUTOF10: 0-NO PAIN

## 2024-12-04 ASSESSMENT — ENCOUNTER SYMPTOMS
OCCASIONAL FEELINGS OF UNSTEADINESS: 0
LOSS OF SENSATION IN FEET: 0
DEPRESSION: 0

## 2024-12-04 ASSESSMENT — LIFESTYLE VARIABLES: HOW MANY STANDARD DRINKS CONTAINING ALCOHOL DO YOU HAVE ON A TYPICAL DAY: PATIENT DOES NOT DRINK

## 2024-12-04 NOTE — PATIENT INSTRUCTIONS
Good to see you today. You mahin 100% better. There is still a few fine dry crackles in your lung on the right in the back. Everything else is fine.

## 2024-12-04 NOTE — PROGRESS NOTES
"Subjective   Patient ID: Manolo Bashir is a 69 y.o. male who presents for Follow-up.    HPI   PMH significant for ESRD (previously on HD [id number]), s/p x2 renal transplant (1992, 2013), now impaired graft function, immune complex GN/proliferative glomerulonephritis , DM2, HTN, prostate CA s/p radical prostatectomy , cholecystectomy, h/o IgG and IgA lambda MGUS,  HCV s/p rx (PCR neg 10/2023)  , h/o thyroid nodules. Was hospitalized in September with pneumonia and sohail pleural effusions. Now reports feeling better overall. Had an episode of diarrhea, everyone in the dialysis center had it, no nausea/vomiting,chills. Now on Lantus 24U without need to use meal time insulin as readings have been below 140. Since last visit has been seen by derm now with good resolve of genital warts. Also seen by urology considering another urethral implant for urine control Back to normal activity level.  On transplant list waiting for a call.     Review of Systems  12 point review of systems including (Constitutional, Eyes, ENMT, Respiratory, Cardiac, Gastrointestinal, Neurological, Psychiatric, and Hematologic) was performed and is otherwise negative   Objective   /87   Pulse 66   Temp 36.3 °C (97.4 °F) (Temporal)   Resp 16   Ht 1.727 m (5' 8\")   Wt 73.9 kg (163 lb)   SpO2 100%   BMI 24.78 kg/m²     Physical Exam  General: Well groomed. Mood appropriate.  HEENT: dry MM  Chest: CTA except for fine crackles in the right lower lobe posterior base.  Heart: RRR  Ext: no edema  MS: full ROM and full strength upper and lower extremities.  Skin: warm, moist, intact     Assessment/Plan   Diagnoses and all orders for this visit:  Essential hypertension  Type 2 diabetes mellitus without complication, with long-term current use of insulin (Multi)  Diabetes mellitus type 2 without retinopathy (Multi)  ESRD (end stage renal disease) on dialysis (Multi)  Other orders  -     Follow Up In Primary Care - Established; Future       "

## 2024-12-10 DIAGNOSIS — I15.9 SECONDARY HYPERTENSION: ICD-10-CM

## 2024-12-10 PROCEDURE — RXMED WILLOW AMBULATORY MEDICATION CHARGE

## 2024-12-11 ENCOUNTER — APPOINTMENT (OUTPATIENT)
Dept: ENDOCRINOLOGY | Facility: CLINIC | Age: 69
End: 2024-12-11
Payer: COMMERCIAL

## 2024-12-11 ENCOUNTER — PHARMACY VISIT (OUTPATIENT)
Dept: PHARMACY | Facility: CLINIC | Age: 69
End: 2024-12-11
Payer: MEDICAID

## 2024-12-11 VITALS
WEIGHT: 160 LBS | HEART RATE: 64 BPM | HEIGHT: 68 IN | DIASTOLIC BLOOD PRESSURE: 65 MMHG | SYSTOLIC BLOOD PRESSURE: 150 MMHG | BODY MASS INDEX: 24.25 KG/M2

## 2024-12-11 DIAGNOSIS — N18.4 TYPE 2 DIABETES MELLITUS WITH STAGE 4 CHRONIC KIDNEY DISEASE, WITH LONG-TERM CURRENT USE OF INSULIN (MULTI): Primary | ICD-10-CM

## 2024-12-11 DIAGNOSIS — E11.22 TYPE 2 DIABETES MELLITUS WITH STAGE 4 CHRONIC KIDNEY DISEASE, WITH LONG-TERM CURRENT USE OF INSULIN (MULTI): Primary | ICD-10-CM

## 2024-12-11 DIAGNOSIS — E11.9 TYPE 2 DIABETES MELLITUS WITHOUT COMPLICATION, WITH LONG-TERM CURRENT USE OF INSULIN (MULTI): ICD-10-CM

## 2024-12-11 DIAGNOSIS — Z79.4 TYPE 2 DIABETES MELLITUS WITHOUT COMPLICATION, WITH LONG-TERM CURRENT USE OF INSULIN (MULTI): ICD-10-CM

## 2024-12-11 DIAGNOSIS — Z79.4 TYPE 2 DIABETES MELLITUS WITH STAGE 4 CHRONIC KIDNEY DISEASE, WITH LONG-TERM CURRENT USE OF INSULIN (MULTI): Primary | ICD-10-CM

## 2024-12-11 LAB — POC FINGERSTICK BLOOD GLUCOSE: 339 MG/DL (ref 70–100)

## 2024-12-11 PROCEDURE — RXMED WILLOW AMBULATORY MEDICATION CHARGE

## 2024-12-11 PROCEDURE — 4010F ACE/ARB THERAPY RXD/TAKEN: CPT | Performed by: STUDENT IN AN ORGANIZED HEALTH CARE EDUCATION/TRAINING PROGRAM

## 2024-12-11 PROCEDURE — 1036F TOBACCO NON-USER: CPT | Performed by: STUDENT IN AN ORGANIZED HEALTH CARE EDUCATION/TRAINING PROGRAM

## 2024-12-11 PROCEDURE — 3078F DIAST BP <80 MM HG: CPT | Performed by: STUDENT IN AN ORGANIZED HEALTH CARE EDUCATION/TRAINING PROGRAM

## 2024-12-11 PROCEDURE — 3044F HG A1C LEVEL LT 7.0%: CPT | Performed by: STUDENT IN AN ORGANIZED HEALTH CARE EDUCATION/TRAINING PROGRAM

## 2024-12-11 PROCEDURE — 82962 GLUCOSE BLOOD TEST: CPT | Performed by: STUDENT IN AN ORGANIZED HEALTH CARE EDUCATION/TRAINING PROGRAM

## 2024-12-11 PROCEDURE — 99213 OFFICE O/P EST LOW 20 MIN: CPT | Performed by: STUDENT IN AN ORGANIZED HEALTH CARE EDUCATION/TRAINING PROGRAM

## 2024-12-11 PROCEDURE — 3077F SYST BP >= 140 MM HG: CPT | Performed by: STUDENT IN AN ORGANIZED HEALTH CARE EDUCATION/TRAINING PROGRAM

## 2024-12-11 PROCEDURE — 3008F BODY MASS INDEX DOCD: CPT | Performed by: STUDENT IN AN ORGANIZED HEALTH CARE EDUCATION/TRAINING PROGRAM

## 2024-12-11 PROCEDURE — 3062F POS MACROALBUMINURIA REV: CPT | Performed by: STUDENT IN AN ORGANIZED HEALTH CARE EDUCATION/TRAINING PROGRAM

## 2024-12-11 PROCEDURE — 1159F MED LIST DOCD IN RCRD: CPT | Performed by: STUDENT IN AN ORGANIZED HEALTH CARE EDUCATION/TRAINING PROGRAM

## 2024-12-11 RX ORDER — NIFEDIPINE 60 MG/1
60 TABLET, FILM COATED, EXTENDED RELEASE ORAL 2 TIMES DAILY
Qty: 60 TABLET | Refills: 1 | Status: SHIPPED | OUTPATIENT
Start: 2024-12-11 | End: 2025-02-09

## 2024-12-11 RX ORDER — INSULIN GLARGINE 100 [IU]/ML
INJECTION, SOLUTION SUBCUTANEOUS
Qty: 15 ML | Refills: 6 | Status: SHIPPED | OUTPATIENT
Start: 2024-12-11

## 2024-12-11 RX ORDER — LANCING DEVICE
EACH MISCELLANEOUS
Qty: 1 EACH | Refills: 0 | Status: SHIPPED | OUTPATIENT
Start: 2024-12-11

## 2024-12-11 NOTE — PATIENT INSTRUCTIONS
Labs fasting from midnight     Decrease you long acting insulin to 18 units daily  Continue lispro sliding scale with meals as needed    Follow up in 4-5 months    Estella Quinonez MD  Divison of Endocrinology   [facility]   Phone: [phone]    option 4, then option 1  Fax: [phone]

## 2024-12-11 NOTE — PROGRESS NOTES
Manolo Bashir is a 69 y.o. male with pmh of h/o ESRD (previously on HD [id number]), s/p x2 renal transplant (1992, 2013), now impaired graft function, immune complex GN/proliferative glomerulonephritis , DM2, HTN, prostate CA s/p radical prostatectomy , cholecystectomy, h/o IgG and IgA lambda MGUS,  HCV s/p rx (PCR neg 10/2023)  , h/o thyroid nodules.     FF up for DM2      Interval: currently on dialysis [facility] thurs sat     Last A1c   Lab Results   Component Value Date    HGBA1C 6.6 (H) 07/10/2024      Home regimen:   Lantus 24 daily qam   Sliding scale with meals as needed    DM diagnosed 13 years  ago, post transplant kidney 2/2 HTN   On chronic prednisone 5mg   Complications Micro and Macro-with residual CKD now on dialysis   Accuchecks/ cgm: smbg- 1-2x/day  BG range:   LDL: 41 Statin: pravastatin 40   ASA: 81  mg daily  Hypoglycemia frequency:  twice a week down [id number]   Hypoglycemia awareness: yes, lightheadedness and warm  BMI 25, wt 170 lbs     Diabetes Complications:   Retinopathy: opthal sees in outside facility a few months ago- states he has glaucoma and they recommended surgery   Foot-sees podiatry  Diabetic neuropathy : none  Nephropathy :ckd 4-5,  GFR 9, ma/cr Random urine protein/creatinine ratio is 5.22 12/2023, off losartan sec to advanced CKD and hyperK   Diet  Per patient has more appetite during dialysis     Exercise: none,ankle pain preempts this     Thyroid nodule:   Thyroid ultrasound in 2022 -homogenous with no suspicious nodules, there are multiple cervical lymph nodes characterized as reactive and benign appearing  He has ultrasounds as far back as 2019         12 point ROS negative except as stated above    Past Medical History:   Diagnosis Date    Anemia     Arthritis     Cataract     Chronic kidney disease, stage 3 unspecified (Multi) 09/26/2018    Stage 3 chronic kidney disease    CKD (chronic kidney disease)     stage V    Cough 02/12/2024    COVID-19 06/18/2020    COVID-19  virus infection    Diabetes (Multi)     ESRD (end stage renal disease) (Multi)     Focal and segmental proliferative glomerulonephritis 12/23/2023    HTN (hypertension)     Hyperlipidemia     Other long term (current) drug therapy 07/20/2021    High risk medication use    Personal history of other diseases of the circulatory system     Personal history of cardiac murmur    Personal history of other infectious and parasitic diseases 08/17/2015    History of hepatitis    Polyp, colonic 08/17/2023    Primary osteoarthritis of both ankles 08/17/2023    Prostate cancer (Multi)     Tubular adenoma of colon 08/17/2023    Unspecified kidney failure 08/17/2016    Renal failure         Family History  Family History   Problem Relation Name Age of Onset    Bone cancer Mother      Other (corona's sarcome of the bone marrow) Mother      Prostate cancer Father      Diabetes Other Family Hist     Hypertension Other Family Hist      Social History     Socioeconomic History    Marital status: Single     Spouse name: Not on file    Number of children: Not on file    Years of education: Not on file    Highest education level: Not on file   Occupational History    Not on file   Tobacco Use    Smoking status: Never     Passive exposure: Past    Smokeless tobacco: Never   Vaping Use    Vaping status: Never Used   Substance and Sexual Activity    Alcohol use: Never    Drug use: Not on file    Sexual activity: Defer   Other Topics Concern    Not on file   Social History Narrative    Not on file     Social Drivers of Health     Financial Resource Strain: Low Risk  (9/12/2024)    Overall Financial Resource Strain (CARDIA)     Difficulty of Paying Living Expenses: Not very hard   Food Insecurity: No Food Insecurity (9/12/2024)    Hunger Vital Sign     Worried About Running Out of Food in the Last Year: Never true     Ran Out of Food in the Last Year: Never true   Transportation Needs: No Transportation Needs (10/14/2024)    OASIS :  Transportation     Lack of Transportation (Medical): No     Lack of Transportation (Non-Medical): No     Patient Unable or Declines to Respond: No   Physical Activity: Sufficiently Active (9/12/2024)    Exercise Vital Sign     Days of Exercise per Week: 7 days     Minutes of Exercise per Session: 60 min   Stress: No Stress Concern Present (9/12/2024)    [employer] - Occupational Stress Questionnaire     Feeling of Stress : Not at all   Social Connections: Feeling Socially Integrated (10/14/2024)    OASIS : Social Isolation     Frequency of experiencing loneliness or isolation: Never   Recent Concern: Social Connections - Socially Isolated (9/12/2024)    Social Connection and Isolation Panel [NHANES]     Frequency of Communication with Friends and Family: More than three times a week     Frequency of Social Gatherings with Friends and Family: More than three times a week     Attends Anabaptist Services: Never     Active Member of Clubs or Organizations: No     Attends Club or Organization Meetings: Never     Marital Status: Never    Intimate Partner Violence: Not At Risk (9/12/2024)    Humiliation, Afraid, Rape, and Kick questionnaire     Fear of Current or Ex-Partner: No     Emotionally Abused: No     Physically Abused: No     Sexually Abused: No   Housing Stability: Low Risk  (9/12/2024)    Housing Stability Vital Sign     Unable to Pay for Housing in the Last Year: No     Number of Times Moved in the Last Year: 0     Homeless in the Last Year: No     Physical Exam  Constitutional:       Appearance: Normal appearance.   Cardiovascular:      Rate and Rhythm: Normal rate and regular rhythm.   Skin:     General: Skin is warm.      Comments: No lipodystorphy   Neurological:      General: No focal deficit present.      Mental Status: He is alert and oriented to person, place, and time.   Psychiatric:         Mood and Affect: Mood normal.         Behavior: Behavior normal.  "          Lab Review  Lab Results   Component Value Date    HGBA1C 6.6 (H) 07/10/2024    HGBA1C 7.7 (H) 01/20/2024    HGBA1C 6.0 (H) 10/26/2023     10/14/2024    K 4.9 10/14/2024     10/14/2024    CO2 28 10/14/2024    BUN 44 (H) 10/14/2024    CREATININE 6.77 (H) 10/14/2024    CALCIUM 9.6 10/14/2024    ALBUMIN 3.9 10/14/2024    PROT 7.2 10/14/2024    PROT 7.2 10/14/2024    BILITOT 0.4 10/14/2024    ALKPHOS 96 10/14/2024    ALT 14 10/14/2024    AST 19 10/14/2024    GLUCOSE 111 (H) 10/14/2024    CHOL 96 07/10/2024    TRIG 46 10/26/2023    HDL 44.3 07/10/2024    BHYDRXBUT 0.14 08/28/2024    CPEPTIDE 4.4 (H) 07/10/2024      Glucose (mg/dL)   Date Value   10/14/2024 111 (H)   09/13/2024 235 (H)   09/12/2024 211 (H)     Hemoglobin A1C (%)   Date Value   07/10/2024 6.6 (H)   01/20/2024 7.7 (H)   10/26/2023 6.0 (H)     Bicarbonate (mmol/L)   Date Value   10/14/2024 28   09/13/2024 30   09/12/2024 27     Urea Nitrogen (mg/dL)   Date Value   10/14/2024 44 (H)   09/13/2024 23   09/12/2024 43 (H)     Creatinine (mg/dL)   Date Value   10/14/2024 6.77 (H)   09/13/2024 4.72 (H)   09/12/2024 6.93 (H)     Lab Results   Component Value Date    CHOL 96 07/10/2024    CHOL 99 10/26/2023    CHOL 128 03/17/2022     Lab Results   Component Value Date    HDL 44.3 07/10/2024    HDL 48.6 10/26/2023    HDL 45.4 03/17/2022     Lab Results   Component Value Date    LDLCALC 41 10/26/2023     Lab Results   Component Value Date    TRIG 46 10/26/2023    TRIG 110 03/17/2022    TRIG 31 02/25/2021     No components found for: \"CHOLHDL\"   Lab Results   Component Value Date    TSH 1.94 07/19/2023     No results found for: \"ALBUR\", \"[id number]\"      Problem List Items Addressed This Visit       Type 2 diabetes mellitus with stage 4 chronic kidney disease, with long-term current use of insulin (Multi) - Primary    Relevant Medications    lancing device misc    Other Relevant Orders    POCT glucose manually resulted (Completed)    Hemoglobin A1C    " Fructosamine    Lipid Panel     Other Visit Diagnoses       Type 2 diabetes mellitus without complication, with long-term current use of insulin (Multi)        Relevant Medications    insulin glargine (Lantus) 100 unit/mL (3 mL) pen            DM2 with hyper and hypoglycemia    Currently doing dialysis    Does not want a CGM.  Checks BG daily but will need to check more frequently to monitor for BG variability especially now that he is on dialysis     Reduce glargine to 18 units   Continue sliding scale with meals as needed 1:50 >200  Fasting labs now    Thyroid:  No need for repeat ultrasound unless a change in clinical status     Follow up in 4-5 months

## 2024-12-12 PROCEDURE — RXMED WILLOW AMBULATORY MEDICATION CHARGE

## 2024-12-15 ENCOUNTER — PHARMACY VISIT (OUTPATIENT)
Dept: PHARMACY | Facility: CLINIC | Age: 69
End: 2024-12-15
Payer: COMMERCIAL

## 2024-12-15 ASSESSMENT — ENCOUNTER SYMPTOMS
ABDOMINAL PAIN: 0
EYE REDNESS: 0
CONSTIPATION: 0
AGITATION: 0
DIARRHEA: 0
SORE THROAT: 0
NAUSEA: 0
JOINT SWELLING: 0
DYSPHORIC MOOD: 0
CONFUSION: 0
DYSURIA: 0
CHOKING: 0
POLYPHAGIA: 0
BLOOD IN STOOL: 0
COUGH: 0
POLYDIPSIA: 0
UNEXPECTED WEIGHT CHANGE: 0
VOMITING: 0
TROUBLE SWALLOWING: 0
WEAKNESS: 0

## 2024-12-15 NOTE — PROGRESS NOTES
Subjective   Patient ID: Manolo Bashir is a 69 y.o. male who presents for   Last OV with me was on 6/3/24.      HPI  Mr. Bashir is a 66 y/o AAM with h/o ESRD secondary to HTN, s/p 2 kidney transplants, with the most recent one back in 2013 (on immunosuppression), h/o DMII, h/o hep C ( treated with 12 wks of Harvoni and has SVR) and h/o prostate ca (s/p radical prostatectomy).      Back in Feb 2023, pt had AUS placed and he developed complications with an infection. It was then removed in end of March 2023. During those months, pt experienced watery diarrhea.      He does have h/o diarrhea (secondary to h/o hemicolectomy), but it is usually controlled with Loperamide.   However, with his current symptoms, he was having abd pain and watery diarrhea (5-10x/day) and having nocturnal episodes. He was given abx for his infected device and after completing therapy, he has been doing well.     At a previous  OV with me in 5/2023,  he was doing much better.  His BM reverted back to normal and he denied any abd pain. He never obtained the stool orders that was ordered by his nephrologist.   He denied an f/c, n/v, or blood in stool. He did have labs drawn early May 2023 revealing stable CBC, CMP and lipase.      He also had CT abd/p (without contrast) on 5/7/23 revealing:       Abdomen-Pelvis  1. Soft tissue stranding at the urinary bladder and perinephric fat  stranding at the left iliac fossa renal transplant. Please correlate  with laboratory values/urinalysis to exclude superimposed cystitis  and pyelonephritis.  2. Interval removal of the artificial urinary sphincter reservoir  with focal hyperdensity at the left rectus abdominal muscle and  overlying soft tissues, may represent small hematoma.  3. Mild colonic diverticulosis.    Previous GI workup includes:     EGD in May 2018 - normal esophagus, bleeding erosive gastropathy (bx showed chronic gastritis, neg H.pylori, neg for int metaplasia), normal duodenum.    "  Screening Colonoscopy on April 2017 - perianal skin tags, normal ileum, patent end to end ileo colonic anastamosis with healthy mucosa, diverticulosis and non bleeding ext hemorrhoids. No specimens collected.    At a previous OV, we did order a screening colonoscopy because he was due back in 2022.  He completed it on 8/2023.  It showed decreased sphincter, patent side to side ileocolonic anast with healthy mucosa, normal ileum, diverticulosis, non bleeding external hemorrhoids.  No specimens collected.  He will be due in 7 yrs (2030).    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  Pt is now back on dialysis.      Back in 4/22/24, he came in for an OV for periumbilcal abd pain that was occurring for 3 months.  Pt did have cholecystecomy  approx 3 months ago for his chronic RUQ pain that radiates to his back, but did not seem to \"solve the problem\". Now he is having this persistent \"pulling pain\" around upper abdominal region down to  belly button and now he is having excess gas, with nausea/vomiting in the morning for past month.  Pt admits to throwing up \"food and acid\".  He denied any hematemesis, melena, or any hematochezia.    Last CT abd (no IV) was on 11/15/23 done for right flank pain.    Showed left pelvis transplant kidney, diffuse anasarca, mild bladder wall thickening, sm HH, large amount of gastric debris.    Since last visit, we presumed his sx may be from gastroparesis. We started him on Reglan - 5mg before each meal and bedtime and we ordered a GES ,which did was done in April 2024 confirming he has gastroparesis.     His last OV with me was in June 2024. He missed his appt with me back in May secondary to being hospitalized for pneumonia and hypoglycemia.  Pt is also back in HD the past 2 wks and has scheduled appt with transplant for possible re listing for another kidney tx.     He is doing better after starting Reglan. He is not having much abd pain and his n/v " episodes has subsided.   At last visit, we had him continue his Reglan 5mg before dinner and at bedtime, as well as continue his daily PPI.  I also referred hi to a dietician to discuss diabetic diet and have him do close follow up with endocrine regarding his diabetes management.     Pt is back today for follow up. He is feeling well and denies any nausea or abdominal pain at this time.  He has no other GI concerns at this time.     Review of Systems   Constitutional:  Negative for unexpected weight change.   HENT:  Negative for sore throat and trouble swallowing.    Eyes:  Negative for redness.   Respiratory:  Negative for cough and choking.    Cardiovascular:  Negative for chest pain.   Gastrointestinal:  Negative for abdominal pain, blood in stool, constipation, diarrhea, nausea and vomiting.   Endocrine: Negative for polydipsia and polyphagia.   Genitourinary:  Negative for dysuria.   Musculoskeletal:  Negative for joint swelling.   Skin:  Negative for rash.   Neurological:  Negative for weakness.   Psychiatric/Behavioral:  Negative for agitation, confusion and dysphoric mood.        Objective   Visit Vitals  Smoking Status Never      Physical Exam  Vitals reviewed.   Constitutional:       Appearance: He is not ill-appearing or toxic-appearing.   HENT:      Head: Normocephalic and atraumatic.      Mouth/Throat:      Pharynx: Oropharynx is clear. No oropharyngeal exudate.   Eyes:      General: No scleral icterus.  Cardiovascular:      Rate and Rhythm: Normal rate and regular rhythm.      Heart sounds: Normal heart sounds.   Pulmonary:      Effort: Pulmonary effort is normal. No respiratory distress.   Abdominal:      General: Bowel sounds are normal. There is no distension.      Palpations: Abdomen is soft.      Tenderness: There is no abdominal tenderness. There is no guarding or rebound.      Comments: Bruising on RLQ from his insulin injections   Musculoskeletal:         General: No deformity.      Cervical  back: Neck supple.   Lymphadenopathy:      Cervical: No cervical adenopathy.   Skin:     Findings: No bruising.   Neurological:      Mental Status: He is alert. Mental status is at baseline.   Psychiatric:         Mood and Affect: Mood normal.         Behavior: Behavior normal.     Assessment/Plan     1) h/o upper abd pain with n/v -  Has now resolved.   S/p GES on April 2024 revealing gastroparesis. We will have pt stop use of Reglan at this time.  His Hgb A1c - 6.6 (July 2024) . Has better diabetic management.   Continue current PPI dosing (Pantoprazole 40 mg daily).    2) h/o adenomatous polyps -  S/p colonoscopy on 8/2023 - No polyps found.  Repeat surveillance in 7 yrs (2030)     Follow up annually, or sooner if needed.

## 2024-12-16 ENCOUNTER — OFFICE VISIT (OUTPATIENT)
Dept: GASTROENTEROLOGY | Facility: HOSPITAL | Age: 69
End: 2024-12-16
Payer: COMMERCIAL

## 2024-12-16 ENCOUNTER — LAB (OUTPATIENT)
Dept: LAB | Facility: LAB | Age: 69
End: 2024-12-16
Payer: COMMERCIAL

## 2024-12-16 VITALS
HEART RATE: 72 BPM | SYSTOLIC BLOOD PRESSURE: 148 MMHG | RESPIRATION RATE: 18 BRPM | TEMPERATURE: 98.2 F | WEIGHT: 160.5 LBS | BODY MASS INDEX: 24.4 KG/M2 | DIASTOLIC BLOOD PRESSURE: 70 MMHG

## 2024-12-16 DIAGNOSIS — E21.3 HYPERPARATHYROIDISM (MULTI): ICD-10-CM

## 2024-12-16 DIAGNOSIS — R11.2 NAUSEA AND VOMITING, UNSPECIFIED VOMITING TYPE: ICD-10-CM

## 2024-12-16 DIAGNOSIS — C61 ADENOCARCINOMA OF PROSTATE (MULTI): ICD-10-CM

## 2024-12-16 DIAGNOSIS — Z94.0 KIDNEY TRANSPLANTED (HHS-HCC): ICD-10-CM

## 2024-12-16 DIAGNOSIS — N18.4 TYPE 2 DIABETES MELLITUS WITH STAGE 4 CHRONIC KIDNEY DISEASE, WITH LONG-TERM CURRENT USE OF INSULIN (MULTI): ICD-10-CM

## 2024-12-16 DIAGNOSIS — Z87.898 H/O ABDOMINAL PAIN: ICD-10-CM

## 2024-12-16 DIAGNOSIS — E11.22 TYPE 2 DIABETES MELLITUS WITH STAGE 4 CHRONIC KIDNEY DISEASE, WITH LONG-TERM CURRENT USE OF INSULIN (MULTI): ICD-10-CM

## 2024-12-16 DIAGNOSIS — Z86.39 H/O DIABETIC GASTROPARESIS: Primary | ICD-10-CM

## 2024-12-16 DIAGNOSIS — Z79.4 TYPE 2 DIABETES MELLITUS WITH STAGE 4 CHRONIC KIDNEY DISEASE, WITH LONG-TERM CURRENT USE OF INSULIN (MULTI): ICD-10-CM

## 2024-12-16 LAB
25(OH)D3 SERPL-MCNC: 27 NG/ML (ref 30–100)
CHOLEST SERPL-MCNC: 80 MG/DL (ref 0–199)
CHOLESTEROL/HDL RATIO: 2.2
EST. AVERAGE GLUCOSE BLD GHB EST-MCNC: 214 MG/DL
HBA1C MFR BLD: 9.1 %
HDLC SERPL-MCNC: 35.8 MG/DL
LDLC SERPL CALC-MCNC: 35 MG/DL
NON HDL CHOLESTEROL: 44 MG/DL (ref 0–149)
PSA SERPL-MCNC: <0.1 NG/ML
PTH-INTACT SERPL-MCNC: 225.9 PG/ML (ref 18.5–88)
TRIGL SERPL-MCNC: 47 MG/DL (ref 0–149)
VLDL: 9 MG/DL (ref 0–40)

## 2024-12-16 PROCEDURE — 99213 OFFICE O/P EST LOW 20 MIN: CPT | Performed by: PHYSICIAN ASSISTANT

## 2024-12-16 PROCEDURE — 83970 ASSAY OF PARATHORMONE: CPT

## 2024-12-16 PROCEDURE — 3062F POS MACROALBUMINURIA REV: CPT | Performed by: PHYSICIAN ASSISTANT

## 2024-12-16 PROCEDURE — 80061 LIPID PANEL: CPT

## 2024-12-16 PROCEDURE — 1126F AMNT PAIN NOTED NONE PRSNT: CPT | Performed by: PHYSICIAN ASSISTANT

## 2024-12-16 PROCEDURE — 83036 HEMOGLOBIN GLYCOSYLATED A1C: CPT

## 2024-12-16 PROCEDURE — 84153 ASSAY OF PSA TOTAL: CPT

## 2024-12-16 PROCEDURE — 1159F MED LIST DOCD IN RCRD: CPT | Performed by: PHYSICIAN ASSISTANT

## 2024-12-16 PROCEDURE — 82306 VITAMIN D 25 HYDROXY: CPT

## 2024-12-16 PROCEDURE — 3078F DIAST BP <80 MM HG: CPT | Performed by: PHYSICIAN ASSISTANT

## 2024-12-16 PROCEDURE — 4010F ACE/ARB THERAPY RXD/TAKEN: CPT | Performed by: PHYSICIAN ASSISTANT

## 2024-12-16 PROCEDURE — 3044F HG A1C LEVEL LT 7.0%: CPT | Performed by: PHYSICIAN ASSISTANT

## 2024-12-16 PROCEDURE — 3077F SYST BP >= 140 MM HG: CPT | Performed by: PHYSICIAN ASSISTANT

## 2024-12-16 ASSESSMENT — PAIN SCALES - GENERAL: PAINLEVEL_OUTOF10: 0-NO PAIN

## 2024-12-18 LAB — FRUCTOSAMINE SERPL-SCNC: 684 UMOL/L (ref 205–285)

## 2024-12-18 RX ORDER — TRIAMCINOLONE ACETONIDE 40 MG/ML
80 INJECTION, SUSPENSION INTRA-ARTICULAR; INTRAMUSCULAR ONCE
Status: COMPLETED | OUTPATIENT
Start: 2024-12-02 | End: 2024-12-02

## 2024-12-19 ENCOUNTER — COMMITTEE REVIEW (OUTPATIENT)
Facility: HOSPITAL | Age: 69
End: 2024-12-19
Payer: MEDICARE

## 2024-12-19 ENCOUNTER — DOCUMENTATION (OUTPATIENT)
Dept: TRANSPLANT | Facility: HOSPITAL | Age: 69
End: 2024-12-19
Payer: COMMERCIAL

## 2024-12-19 NOTE — PROGRESS NOTES
Transplant Candidate Pharmacist Screening Note     Current Outpatient Medications on File Prior to Visit   Medication Sig Dispense Refill    acetaminophen (Tylenol) 325 mg tablet Take 3 tablets (975 mg) by mouth every 6 hours if needed (pain). 30 tablet 11    aspirin 81 mg chewable tablet Chew 1 tablet (81 mg) once daily. 30 tablet 11    calcitriol (Rocaltrol) 0.5 mcg capsule Take 1 capsule (0.5 mcg) by mouth once daily. 90 capsule 3    carvedilol (Coreg) 25 mg tablet Take 1 tablet (25 mg) by mouth every 12 hours.      cinacalcet (Sensipar) 30 mg tablet Take 1 tablet (30 mg) by mouth once daily. 30 tablet 11    Easy Touch Alcohol Prep Pads pads, medicated       epoetin aida 10,000 unit/mL injection Inject 1 mL (10,000 Units) under the skin every 7 days. Do not start before April 17, 2024. (Patient not taking: Reported on 12/11/2024) 4 mL 11    glucagon (Gvoke HypoPen 1-Pack) 1 mg/0.2 mL auto-injector Inject 1 mg into the muscle every 15 minutes if needed (For blood glucose 41 to 70 mg/dL and no IV access). (Patient not taking: Reported on 12/11/2024) 0.2 mL 12    hydrALAZINE (Apresoline) 50 mg tablet Take 2 tablets (100 mg) by mouth 2 times a day. (Patient not taking: Reported on 12/16/2024) 120 tablet 11    imiquimod (Aldara) 5 % cream Apply 1 packet topically 3 times a week. 12 packet 3    insulin glargine (Lantus) 100 unit/mL (3 mL) pen Inject 20 units daily as directed (Patient taking differently: Inject 18 Units under the skin once daily in the morning. Inject 20 units daily as directed) 15 mL 6    insulin lispro (HumaLOG) 100 unit/mL injection Inject under the skin 3 times a day with meals. 100-199 2 units 200-299 4 units 300-399 6 units      isosorbide mononitrate ER (Imdur) 60 mg 24 hr tablet Take 1 tablet (60 mg) by mouth once daily. 90 tablet 3    lancets (Unilet Super Thin Lancets) 30 gauge misc USE TO TEST BLOOD SUGAR THREE TIMES A  each 0    lancing device misc use as directed 1 each 0     "losartan (Cozaar) 25 mg tablet Take 1 tablet (25 mg) by mouth early in the morning..      metoclopramide (Reglan) 5 mg tablet Take 1 tablet (5 mg) by mouth 2 times a day. 1 tablet before dinner and 1 tablet at bedtime. (need OFFICE VISIT by end of october -early november per MD) 60 tablet 0    NIFEdipine ER (Adalat CC) 60 mg 24 hr tablet Take 1 tablet (60 mg) by mouth 2 times a day. Do not crush, chew, or split. 60 tablet 1    pantoprazole (ProtoNix) 40 mg EC tablet Take 1 tablet (40 mg) by mouth once daily in the morning. Take before meals. Do not crush, chew, or split. Do not start before January 22, 2024. 30 tablet 11    pen needle, diabetic (TechLITE Pen Needle) 32 gauge x 5/32\" needle Use 4 a day as directed 400 each 3    pravastatin (Pravachol) 10 mg tablet Take 1 tablet (10 mg) by mouth once daily at bedtime. 90 tablet 1    syringe with needle 3 mL 25 x 5/8\" syringe Use to inject epogen. 7 each 0    tacrolimus (Prograf) 0.5 mg capsule Take 1 capsule (0.5 mg) by mouth 2 times a day. 60 capsule 11    tacrolimus (Protopic) 0.1 % ointment Apply topically 2 times a day. (Patient not taking: Reported on 12/16/2024) 60 g 3    Unilet Super Thin Lancets 30 gauge misc 1 each 3 times a day. 100 each 3    vitamin B complex-vitamin C-folic acid (Nephrocaps) 1 mg capsule Take 1 capsule by mouth once daily. 30 capsule 11     No current facility-administered medications on file prior to visit.       The patient's reported medications have been reviewed. Based on the above medication list, there are no pharmacologic contraindications to kidney transplantation.  Of note, the patient was most recently taking AZA as antimetabolite as he had issues with mycophenolate. Issues through to GI, but not certain per chart review. Would plan to retrial mycophenolate, potentially Myfortic over Cellcept, at time of next transplant.    Randi Ruffin, PharmD, BCTXP  Solid Organ Transplant Clinical Pharmacy Specialist   "

## 2024-12-23 ENCOUNTER — PATIENT MESSAGE (OUTPATIENT)
Dept: TRANSPLANT | Facility: HOSPITAL | Age: 69
End: 2024-12-23
Payer: MEDICARE

## 2024-12-23 ENCOUNTER — TELEPHONE (OUTPATIENT)
Dept: PRIMARY CARE | Facility: CLINIC | Age: 69
End: 2024-12-23
Payer: MEDICARE

## 2024-12-23 DIAGNOSIS — Z79.4 TYPE 2 DIABETES MELLITUS WITHOUT COMPLICATION, WITH LONG-TERM CURRENT USE OF INSULIN (MULTI): ICD-10-CM

## 2024-12-23 DIAGNOSIS — Z94.0 KIDNEY REPLACED BY TRANSPLANT (HHS-HCC): ICD-10-CM

## 2024-12-23 DIAGNOSIS — E78.5 DYSLIPIDEMIA: ICD-10-CM

## 2024-12-23 DIAGNOSIS — E11.65 TYPE 2 DIABETES MELLITUS WITH HYPERGLYCEMIA, WITH LONG-TERM CURRENT USE OF INSULIN: Primary | ICD-10-CM

## 2024-12-23 DIAGNOSIS — N18.4 TYPE 2 DIABETES MELLITUS WITH STAGE 4 CHRONIC KIDNEY DISEASE, WITH LONG-TERM CURRENT USE OF INSULIN (MULTI): ICD-10-CM

## 2024-12-23 DIAGNOSIS — E11.22 TYPE 2 DIABETES MELLITUS WITH STAGE 4 CHRONIC KIDNEY DISEASE, WITH LONG-TERM CURRENT USE OF INSULIN (MULTI): ICD-10-CM

## 2024-12-23 DIAGNOSIS — E11.9 TYPE 2 DIABETES MELLITUS WITHOUT COMPLICATION, WITH LONG-TERM CURRENT USE OF INSULIN (MULTI): ICD-10-CM

## 2024-12-23 DIAGNOSIS — R11.2 NAUSEA AND VOMITING, UNSPECIFIED VOMITING TYPE: ICD-10-CM

## 2024-12-23 DIAGNOSIS — Z79.4 TYPE 2 DIABETES MELLITUS WITH HYPERGLYCEMIA, WITH LONG-TERM CURRENT USE OF INSULIN: Primary | ICD-10-CM

## 2024-12-23 DIAGNOSIS — Z79.4 TYPE 2 DIABETES MELLITUS WITH STAGE 4 CHRONIC KIDNEY DISEASE, WITH LONG-TERM CURRENT USE OF INSULIN (MULTI): ICD-10-CM

## 2024-12-23 PROCEDURE — RXMED WILLOW AMBULATORY MEDICATION CHARGE

## 2024-12-23 RX ORDER — INSULIN GLARGINE 100 [IU]/ML
18 INJECTION, SOLUTION SUBCUTANEOUS EVERY MORNING
Qty: 15 ML | Refills: 5 | Status: SHIPPED | OUTPATIENT
Start: 2024-12-23

## 2024-12-23 RX ORDER — INSULIN LISPRO 100 [IU]/ML
INJECTION, SOLUTION INTRAVENOUS; SUBCUTANEOUS
Qty: 15 ML | Refills: 3 | Status: SHIPPED | OUTPATIENT
Start: 2024-12-23

## 2024-12-24 ENCOUNTER — PHARMACY VISIT (OUTPATIENT)
Dept: PHARMACY | Facility: CLINIC | Age: 69
End: 2024-12-24
Payer: COMMERCIAL

## 2024-12-24 PROCEDURE — RXMED WILLOW AMBULATORY MEDICATION CHARGE

## 2024-12-24 RX ORDER — PREDNISONE 5 MG/1
5 TABLET ORAL DAILY
Qty: 90 TABLET | Refills: 3 | Status: SHIPPED | OUTPATIENT
Start: 2024-12-24 | End: 2024-12-24 | Stop reason: SDUPTHER

## 2024-12-24 RX ORDER — PREDNISONE 5 MG/1
5 TABLET ORAL DAILY
Qty: 90 TABLET | Refills: 3 | Status: SHIPPED | OUTPATIENT
Start: 2024-12-24 | End: 2025-12-24

## 2024-12-26 DIAGNOSIS — I10 ESSENTIAL HYPERTENSION: ICD-10-CM

## 2024-12-26 DIAGNOSIS — E55.9 VITAMIN D DEFICIENCY: ICD-10-CM

## 2024-12-26 PROCEDURE — RXMED WILLOW AMBULATORY MEDICATION CHARGE

## 2024-12-26 RX ORDER — CARVEDILOL 25 MG/1
25 TABLET ORAL
Qty: 90 TABLET | Refills: 1 | Status: CANCELLED | OUTPATIENT
Start: 2024-12-26

## 2024-12-26 RX ORDER — LANCING DEVICE
EACH MISCELLANEOUS
Qty: 1 EACH | Refills: 0 | OUTPATIENT
Start: 2024-12-26

## 2024-12-26 RX ORDER — PRAVASTATIN SODIUM 10 MG/1
10 TABLET ORAL NIGHTLY
Qty: 90 TABLET | Refills: 1 | Status: SHIPPED | OUTPATIENT
Start: 2024-12-26 | End: 2025-06-24

## 2024-12-26 RX ORDER — METOCLOPRAMIDE 5 MG/1
5 TABLET ORAL 2 TIMES DAILY
Qty: 60 TABLET | Refills: 0 | OUTPATIENT
Start: 2024-12-26 | End: 2025-12-26

## 2024-12-26 NOTE — TELEPHONE ENCOUNTER
I had pt stop this at his last visit with me 2 wks ago.  Could you ask and see if he is requesting this med? Or the pharmacy ?  Thanks !

## 2024-12-27 ENCOUNTER — PHARMACY VISIT (OUTPATIENT)
Dept: PHARMACY | Facility: CLINIC | Age: 69
End: 2024-12-27
Payer: COMMERCIAL

## 2024-12-27 DIAGNOSIS — I10 ESSENTIAL HYPERTENSION: Primary | ICD-10-CM

## 2024-12-27 NOTE — COMMITTEE REVIEW
Evaluation Date: 7/10/2024   Committee Review Date: 12/19/2024   Organ being evaluated for: Kidney     Transplant Phase:  Evaluation   Transplant Status: Active     Referring Physician:     Transplant Physician: Francheska Zhang     Primary Diagnosis: Hypertensive Nephrosclerosis     Committee Members:   Kathie Patel, REY, LD   FinancJeff Lowry   Lab Cathi Simon, [place]   Pharmacy Randi Ruffin PharmD    Melia Peña, [facility]; Nicole Arias, [facility]   Transplant Aeder, Alfredito LARKIN MD; Prieto Arreguin; Lu Brown, AYO; Asiya Mahoney, AYO; Valerie Archer, AYO; Kathrine Argueta RN   Transplant Nephrology Raad Rolle MD; Kaycee Arroyo MD   Transplant Surgery Geronimo Jimenez MD; Sofia Lara MD; Farzana Chu MD       Committee Review Decision:    The candidate's evaluation was presented and discussed at the Transplant Multidisciplinary Selection Conference. After review of the candidate's diagnosis and the evaluations of the multidisciplinary team members, the committee made the following recommendations:     Continue current status: needs uro, provider visit, and listing auth (dental with [facility])    Resolution:  Continue eval.  Needs urology, provider visit, and listing auth with [facility] - will need dental.      Lab Results   Component Value Date    HEPBSAB 4.7 09/11/2024       Immunization History   Administered Date(s) Administered Comments    Hep A / Hep B 08/17/2015

## 2024-12-30 RX ORDER — CARVEDILOL 25 MG/1
25 TABLET ORAL
Qty: 30 TABLET | Refills: 1 | Status: SHIPPED | OUTPATIENT
Start: 2024-12-30 | End: 2024-12-31

## 2024-12-31 ENCOUNTER — PHARMACY VISIT (OUTPATIENT)
Dept: PHARMACY | Facility: CLINIC | Age: 69
End: 2024-12-31
Payer: COMMERCIAL

## 2024-12-31 PROCEDURE — RXMED WILLOW AMBULATORY MEDICATION CHARGE

## 2025-01-03 RX ORDER — SYRINGE WITH NEEDLE, 1 ML 25GX5/8"
SYRINGE, EMPTY DISPOSABLE MISCELLANEOUS
Qty: 7 EACH | Refills: 0 | OUTPATIENT
Start: 2025-01-03

## 2025-01-05 ENCOUNTER — APPOINTMENT (OUTPATIENT)
Dept: RADIOLOGY | Facility: HOSPITAL | Age: 70
End: 2025-01-05
Payer: MEDICARE

## 2025-01-05 ENCOUNTER — HOSPITAL ENCOUNTER (INPATIENT)
Facility: HOSPITAL | Age: 70
LOS: 3 days | Discharge: HOME | End: 2025-01-08
Attending: EMERGENCY MEDICINE | Admitting: INTERNAL MEDICINE
Payer: MEDICARE

## 2025-01-05 DIAGNOSIS — T86.11 CHRONIC REJECTION OF KIDNEY TRANSPLANT (HHS-HCC): ICD-10-CM

## 2025-01-05 DIAGNOSIS — E11.65 TYPE 2 DIABETES MELLITUS WITH HYPERGLYCEMIA, WITH LONG-TERM CURRENT USE OF INSULIN: ICD-10-CM

## 2025-01-05 DIAGNOSIS — Z01.818 PRE-TRANSPLANT EVALUATION FOR KIDNEY TRANSPLANT: ICD-10-CM

## 2025-01-05 DIAGNOSIS — R07.9 CHEST PAIN, UNSPECIFIED TYPE: ICD-10-CM

## 2025-01-05 DIAGNOSIS — Z79.4 TYPE 2 DIABETES MELLITUS WITH HYPERGLYCEMIA, WITH LONG-TERM CURRENT USE OF INSULIN: ICD-10-CM

## 2025-01-05 DIAGNOSIS — E11.9 TYPE 2 DIABETES MELLITUS WITHOUT COMPLICATION, WITH LONG-TERM CURRENT USE OF INSULIN (MULTI): ICD-10-CM

## 2025-01-05 DIAGNOSIS — N12 PYELONEPHRITIS: Primary | ICD-10-CM

## 2025-01-05 DIAGNOSIS — Z79.4 TYPE 2 DIABETES MELLITUS WITHOUT COMPLICATION, WITH LONG-TERM CURRENT USE OF INSULIN (MULTI): ICD-10-CM

## 2025-01-05 DIAGNOSIS — Z94.0 KIDNEY REPLACED BY TRANSPLANT (HHS-HCC): ICD-10-CM

## 2025-01-05 LAB
ALBUMIN SERPL BCP-MCNC: 3.7 G/DL (ref 3.4–5)
ALP SERPL-CCNC: 89 U/L (ref 33–136)
ALT SERPL W P-5'-P-CCNC: 16 U/L (ref 10–52)
ANION GAP SERPL CALC-SCNC: 14 MMOL/L (ref 10–20)
AST SERPL W P-5'-P-CCNC: 16 U/L (ref 9–39)
BASOPHILS # BLD AUTO: 0.02 X10*3/UL (ref 0–0.1)
BASOPHILS NFR BLD AUTO: 0.5 %
BILIRUB SERPL-MCNC: 0.5 MG/DL (ref 0–1.2)
BUN SERPL-MCNC: 24 MG/DL (ref 6–23)
CALCIUM SERPL-MCNC: 9.5 MG/DL (ref 8.6–10.6)
CHLORIDE SERPL-SCNC: 96 MMOL/L (ref 98–107)
CO2 SERPL-SCNC: 28 MMOL/L (ref 21–32)
CREAT SERPL-MCNC: 8.43 MG/DL (ref 0.5–1.3)
EGFRCR SERPLBLD CKD-EPI 2021: 6 ML/MIN/1.73M*2
EOSINOPHIL # BLD AUTO: 0.18 X10*3/UL (ref 0–0.7)
EOSINOPHIL NFR BLD AUTO: 4.5 %
ERYTHROCYTE [DISTWIDTH] IN BLOOD BY AUTOMATED COUNT: 14.9 % (ref 11.5–14.5)
GLUCOSE BLD MANUAL STRIP-MCNC: 215 MG/DL (ref 74–99)
GLUCOSE BLD MANUAL STRIP-MCNC: 222 MG/DL (ref 74–99)
GLUCOSE SERPL-MCNC: 218 MG/DL (ref 74–99)
HCT VFR BLD AUTO: 30.7 % (ref 41–52)
HGB BLD-MCNC: 10.5 G/DL (ref 13.5–17.5)
IMM GRANULOCYTES # BLD AUTO: 0.03 X10*3/UL (ref 0–0.7)
IMM GRANULOCYTES NFR BLD AUTO: 0.8 % (ref 0–0.9)
LYMPHOCYTES # BLD AUTO: 0.65 X10*3/UL (ref 1.2–4.8)
LYMPHOCYTES NFR BLD AUTO: 16.4 %
MCH RBC QN AUTO: 27.9 PG (ref 26–34)
MCHC RBC AUTO-ENTMCNC: 34.2 G/DL (ref 32–36)
MCV RBC AUTO: 82 FL (ref 80–100)
MONOCYTES # BLD AUTO: 0.48 X10*3/UL (ref 0.1–1)
MONOCYTES NFR BLD AUTO: 12.1 %
NEUTROPHILS # BLD AUTO: 2.61 X10*3/UL (ref 1.2–7.7)
NEUTROPHILS NFR BLD AUTO: 65.7 %
NRBC BLD-RTO: 0 /100 WBCS (ref 0–0)
PLATELET # BLD AUTO: 90 X10*3/UL (ref 150–450)
POTASSIUM SERPL-SCNC: 4.8 MMOL/L (ref 3.5–5.3)
PROT SERPL-MCNC: 7.7 G/DL (ref 6.4–8.2)
RBC # BLD AUTO: 3.76 X10*6/UL (ref 4.5–5.9)
SODIUM SERPL-SCNC: 133 MMOL/L (ref 136–145)
WBC # BLD AUTO: 4 X10*3/UL (ref 4.4–11.3)

## 2025-01-05 PROCEDURE — 2500000002 HC RX 250 W HCPCS SELF ADMINISTERED DRUGS (ALT 637 FOR MEDICARE OP, ALT 636 FOR OP/ED)

## 2025-01-05 PROCEDURE — 36415 COLL VENOUS BLD VENIPUNCTURE: CPT

## 2025-01-05 PROCEDURE — 96375 TX/PRO/DX INJ NEW DRUG ADDON: CPT

## 2025-01-05 PROCEDURE — 99285 EMERGENCY DEPT VISIT HI MDM: CPT | Performed by: EMERGENCY MEDICINE

## 2025-01-05 PROCEDURE — 85025 COMPLETE CBC W/AUTO DIFF WBC: CPT

## 2025-01-05 PROCEDURE — 82947 ASSAY GLUCOSE BLOOD QUANT: CPT

## 2025-01-05 PROCEDURE — 1210000001 HC SEMI-PRIVATE ROOM DAILY

## 2025-01-05 PROCEDURE — 80197 ASSAY OF TACROLIMUS: CPT

## 2025-01-05 PROCEDURE — 93010 ELECTROCARDIOGRAM REPORT: CPT | Performed by: EMERGENCY MEDICINE

## 2025-01-05 PROCEDURE — 96365 THER/PROPH/DIAG IV INF INIT: CPT

## 2025-01-05 PROCEDURE — 84075 ASSAY ALKALINE PHOSPHATASE: CPT

## 2025-01-05 PROCEDURE — 2500000001 HC RX 250 WO HCPCS SELF ADMINISTERED DRUGS (ALT 637 FOR MEDICARE OP)

## 2025-01-05 PROCEDURE — 74176 CT ABD & PELVIS W/O CONTRAST: CPT | Performed by: STUDENT IN AN ORGANIZED HEALTH CARE EDUCATION/TRAINING PROGRAM

## 2025-01-05 PROCEDURE — 2500000004 HC RX 250 GENERAL PHARMACY W/ HCPCS (ALT 636 FOR OP/ED)

## 2025-01-05 PROCEDURE — 80053 COMPREHEN METABOLIC PANEL: CPT

## 2025-01-05 PROCEDURE — 99222 1ST HOSP IP/OBS MODERATE 55: CPT

## 2025-01-05 PROCEDURE — 99285 EMERGENCY DEPT VISIT HI MDM: CPT | Mod: 25 | Performed by: EMERGENCY MEDICINE

## 2025-01-05 PROCEDURE — 74176 CT ABD & PELVIS W/O CONTRAST: CPT

## 2025-01-05 RX ORDER — DEXTROSE 50 % IN WATER (D50W) INTRAVENOUS SYRINGE
12.5
Status: DISCONTINUED | OUTPATIENT
Start: 2025-01-05 | End: 2025-01-08 | Stop reason: HOSPADM

## 2025-01-05 RX ORDER — INSULIN GLARGINE 100 [IU]/ML
10 INJECTION, SOLUTION SUBCUTANEOUS EVERY 24 HOURS
Status: DISCONTINUED | OUTPATIENT
Start: 2025-01-05 | End: 2025-01-06

## 2025-01-05 RX ORDER — NIFEDIPINE 60 MG/1
60 TABLET, FILM COATED, EXTENDED RELEASE ORAL 2 TIMES DAILY
Status: DISCONTINUED | OUTPATIENT
Start: 2025-01-05 | End: 2025-01-06

## 2025-01-05 RX ORDER — LIDOCAINE 560 MG/1
1 PATCH PERCUTANEOUS; TOPICAL; TRANSDERMAL DAILY
Status: DISCONTINUED | OUTPATIENT
Start: 2025-01-05 | End: 2025-01-08 | Stop reason: HOSPADM

## 2025-01-05 RX ORDER — CARVEDILOL 12.5 MG/1
25 TABLET ORAL 2 TIMES DAILY
Status: DISCONTINUED | OUTPATIENT
Start: 2025-01-05 | End: 2025-01-08 | Stop reason: HOSPADM

## 2025-01-05 RX ORDER — HEPARIN SODIUM 5000 [USP'U]/ML
5000 INJECTION, SOLUTION INTRAVENOUS; SUBCUTANEOUS EVERY 8 HOURS
Status: DISCONTINUED | OUTPATIENT
Start: 2025-01-05 | End: 2025-01-08 | Stop reason: HOSPADM

## 2025-01-05 RX ORDER — PANTOPRAZOLE SODIUM 40 MG/1
40 TABLET, DELAYED RELEASE ORAL
Status: DISCONTINUED | OUTPATIENT
Start: 2025-01-06 | End: 2025-01-08 | Stop reason: HOSPADM

## 2025-01-05 RX ORDER — TACROLIMUS 0.5 MG/1
0.5 CAPSULE ORAL EVERY 12 HOURS
Status: DISCONTINUED | OUTPATIENT
Start: 2025-01-05 | End: 2025-01-05

## 2025-01-05 RX ORDER — INSULIN LISPRO 100 [IU]/ML
1 INJECTION, SOLUTION INTRAVENOUS; SUBCUTANEOUS
Status: DISCONTINUED | OUTPATIENT
Start: 2025-01-05 | End: 2025-01-08

## 2025-01-05 RX ORDER — NAPROXEN SODIUM 220 MG/1
81 TABLET, FILM COATED ORAL DAILY
Status: DISCONTINUED | OUTPATIENT
Start: 2025-01-06 | End: 2025-01-08 | Stop reason: HOSPADM

## 2025-01-05 RX ORDER — INSULIN LISPRO 100 [IU]/ML
0-5 INJECTION, SOLUTION INTRAVENOUS; SUBCUTANEOUS
Status: DISCONTINUED | OUTPATIENT
Start: 2025-01-05 | End: 2025-01-08 | Stop reason: HOSPADM

## 2025-01-05 RX ORDER — HYDROMORPHONE HYDROCHLORIDE 1 MG/ML
0.5 INJECTION, SOLUTION INTRAMUSCULAR; INTRAVENOUS; SUBCUTANEOUS ONCE
Status: COMPLETED | OUTPATIENT
Start: 2025-01-05 | End: 2025-01-05

## 2025-01-05 RX ORDER — TACROLIMUS 0.5 MG/1
0.5 CAPSULE ORAL EVERY 12 HOURS
Status: DISCONTINUED | OUTPATIENT
Start: 2025-01-05 | End: 2025-01-08 | Stop reason: HOSPADM

## 2025-01-05 RX ORDER — HYDRALAZINE HYDROCHLORIDE 25 MG/1
25 TABLET, FILM COATED ORAL EVERY 8 HOURS PRN
Status: DISCONTINUED | OUTPATIENT
Start: 2025-01-05 | End: 2025-01-06

## 2025-01-05 RX ORDER — ACETAMINOPHEN 325 MG/1
975 TABLET ORAL EVERY 6 HOURS PRN
Status: DISCONTINUED | OUTPATIENT
Start: 2025-01-05 | End: 2025-01-05

## 2025-01-05 RX ORDER — PRAVASTATIN SODIUM 10 MG/1
10 TABLET ORAL NIGHTLY
Status: DISCONTINUED | OUTPATIENT
Start: 2025-01-05 | End: 2025-01-08 | Stop reason: HOSPADM

## 2025-01-05 RX ORDER — ACETAMINOPHEN 325 MG/1
975 TABLET ORAL 3 TIMES DAILY
Status: DISCONTINUED | OUTPATIENT
Start: 2025-01-05 | End: 2025-01-08 | Stop reason: HOSPADM

## 2025-01-05 RX ORDER — OXYCODONE HYDROCHLORIDE 5 MG/1
5 TABLET ORAL EVERY 4 HOURS PRN
Status: DISCONTINUED | OUTPATIENT
Start: 2025-01-05 | End: 2025-01-08 | Stop reason: HOSPADM

## 2025-01-05 RX ORDER — CALCITRIOL 0.5 UG/1
0.5 CAPSULE ORAL DAILY
Status: DISCONTINUED | OUTPATIENT
Start: 2025-01-06 | End: 2025-01-08 | Stop reason: HOSPADM

## 2025-01-05 RX ORDER — CINACALCET 30 MG/1
30 TABLET, FILM COATED ORAL DAILY
Status: DISCONTINUED | OUTPATIENT
Start: 2025-01-05 | End: 2025-01-08 | Stop reason: HOSPADM

## 2025-01-05 RX ORDER — METOCLOPRAMIDE 10 MG/1
5 TABLET ORAL
Status: DISCONTINUED | OUTPATIENT
Start: 2025-01-05 | End: 2025-01-08 | Stop reason: HOSPADM

## 2025-01-05 RX ORDER — PREDNISONE 5 MG/1
5 TABLET ORAL DAILY
Status: DISCONTINUED | OUTPATIENT
Start: 2025-01-06 | End: 2025-01-08

## 2025-01-05 RX ORDER — CEFTRIAXONE 1 G/50ML
1 INJECTION, SOLUTION INTRAVENOUS ONCE
Status: COMPLETED | OUTPATIENT
Start: 2025-01-05 | End: 2025-01-05

## 2025-01-05 RX ORDER — TACROLIMUS 0.5 MG/1
0.5 CAPSULE ORAL
Status: DISCONTINUED | OUTPATIENT
Start: 2025-01-05 | End: 2025-01-05

## 2025-01-05 RX ORDER — DEXTROSE 50 % IN WATER (D50W) INTRAVENOUS SYRINGE
25
Status: DISCONTINUED | OUTPATIENT
Start: 2025-01-05 | End: 2025-01-08 | Stop reason: HOSPADM

## 2025-01-05 RX ORDER — ISOSORBIDE MONONITRATE 30 MG/1
60 TABLET, EXTENDED RELEASE ORAL DAILY
Status: DISCONTINUED | OUTPATIENT
Start: 2025-01-06 | End: 2025-01-08 | Stop reason: HOSPADM

## 2025-01-05 RX ORDER — HYDRALAZINE HYDROCHLORIDE 50 MG/1
100 TABLET, FILM COATED ORAL 2 TIMES DAILY
Status: DISCONTINUED | OUTPATIENT
Start: 2025-01-05 | End: 2025-01-05

## 2025-01-05 RX ORDER — PREDNISONE 5 MG/1
5 TABLET ORAL DAILY
Status: DISCONTINUED | OUTPATIENT
Start: 2025-01-05 | End: 2025-01-05

## 2025-01-05 RX ORDER — ISOSORBIDE MONONITRATE 30 MG/1
60 TABLET, EXTENDED RELEASE ORAL DAILY
Status: DISCONTINUED | OUTPATIENT
Start: 2025-01-05 | End: 2025-01-05

## 2025-01-05 RX ADMIN — HYDROMORPHONE HYDROCHLORIDE 0.5 MG: 1 INJECTION, SOLUTION INTRAMUSCULAR; INTRAVENOUS; SUBCUTANEOUS at 15:23

## 2025-01-05 RX ADMIN — HEPARIN SODIUM 5000 UNITS: 5000 INJECTION, SOLUTION INTRAVENOUS; SUBCUTANEOUS at 22:20

## 2025-01-05 RX ADMIN — INSULIN LISPRO 2 UNITS: 100 INJECTION, SOLUTION INTRAVENOUS; SUBCUTANEOUS at 18:55

## 2025-01-05 RX ADMIN — CARVEDILOL 25 MG: 12.5 TABLET, FILM COATED ORAL at 19:42

## 2025-01-05 RX ADMIN — CEFTRIAXONE SODIUM 1 G: 1 INJECTION, SOLUTION INTRAVENOUS at 16:08

## 2025-01-05 RX ADMIN — INSULIN LISPRO 1 UNITS: 100 INJECTION, SOLUTION INTRAVENOUS; SUBCUTANEOUS at 18:58

## 2025-01-05 RX ADMIN — TACROLIMUS 0.5 MG: 0.5 CAPSULE ORAL at 19:43

## 2025-01-05 RX ADMIN — NIFEDIPINE 60 MG: 60 TABLET, FILM COATED, EXTENDED RELEASE ORAL at 19:43

## 2025-01-05 RX ADMIN — ACETAMINOPHEN 975 MG: 325 TABLET ORAL at 18:55

## 2025-01-05 RX ADMIN — HYDRALAZINE HYDROCHLORIDE 25 MG: 25 TABLET ORAL at 19:43

## 2025-01-05 RX ADMIN — PIPERACILLIN SODIUM AND TAZOBACTAM SODIUM 2.25 G: 2; .25 INJECTION, SOLUTION INTRAVENOUS at 19:43

## 2025-01-05 RX ADMIN — METOCLOPRAMIDE 5 MG: 10 TABLET ORAL at 17:10

## 2025-01-05 RX ADMIN — INSULIN GLARGINE 10 UNITS: 100 INJECTION, SOLUTION SUBCUTANEOUS at 20:04

## 2025-01-05 RX ADMIN — OXYCODONE HYDROCHLORIDE 5 MG: 5 TABLET ORAL at 19:40

## 2025-01-05 RX ADMIN — CINACALCET HYDROCHLORIDE 30 MG: 30 TABLET, FILM COATED ORAL at 21:03

## 2025-01-05 SDOH — HEALTH STABILITY: MENTAL HEALTH: HOW OFTEN DO YOU HAVE SIX OR MORE DRINKS ON ONE OCCASION?: NEVER

## 2025-01-05 SDOH — SOCIAL STABILITY: SOCIAL INSECURITY: HAVE YOU HAD THOUGHTS OF HARMING ANYONE ELSE?: NO

## 2025-01-05 SDOH — SOCIAL STABILITY: SOCIAL INSECURITY
WITHIN THE LAST YEAR, HAVE YOU BEEN RAPED OR FORCED TO HAVE ANY KIND OF SEXUAL ACTIVITY BY YOUR PARTNER OR EX-PARTNER?: NO

## 2025-01-05 SDOH — ECONOMIC STABILITY: FOOD INSECURITY: WITHIN THE PAST 12 MONTHS, YOU WORRIED THAT YOUR FOOD WOULD RUN OUT BEFORE YOU GOT THE MONEY TO BUY MORE.: NEVER TRUE

## 2025-01-05 SDOH — SOCIAL STABILITY: SOCIAL NETWORK
DO YOU BELONG TO ANY CLUBS OR ORGANIZATIONS SUCH AS CHURCH GROUPS, UNIONS, FRATERNAL OR ATHLETIC GROUPS, OR SCHOOL GROUPS?: NO

## 2025-01-05 SDOH — SOCIAL STABILITY: SOCIAL INSECURITY: DO YOU FEEL ANYONE HAS EXPLOITED OR TAKEN ADVANTAGE OF YOU FINANCIALLY OR OF YOUR PERSONAL PROPERTY?: NO

## 2025-01-05 SDOH — SOCIAL STABILITY: SOCIAL INSECURITY: DO YOU FEEL UNSAFE GOING BACK TO THE PLACE WHERE YOU ARE LIVING?: NO

## 2025-01-05 SDOH — HEALTH STABILITY: MENTAL HEALTH: HOW OFTEN DO YOU HAVE A DRINK CONTAINING ALCOHOL?: NEVER

## 2025-01-05 SDOH — ECONOMIC STABILITY: HOUSING INSECURITY: IN THE LAST 12 MONTHS, WAS THERE A TIME WHEN YOU WERE NOT ABLE TO PAY THE MORTGAGE OR RENT ON TIME?: NO

## 2025-01-05 SDOH — SOCIAL STABILITY: SOCIAL INSECURITY: WITHIN THE LAST YEAR, HAVE YOU BEEN HUMILIATED OR EMOTIONALLY ABUSED IN OTHER WAYS BY YOUR PARTNER OR EX-PARTNER?: NO

## 2025-01-05 SDOH — HEALTH STABILITY: PHYSICAL HEALTH: ON AVERAGE, HOW MANY MINUTES DO YOU ENGAGE IN EXERCISE AT THIS LEVEL?: 60 MIN

## 2025-01-05 SDOH — ECONOMIC STABILITY: HOUSING INSECURITY: AT ANY TIME IN THE PAST 12 MONTHS, WERE YOU HOMELESS OR LIVING IN A SHELTER (INCLUDING NOW)?: NO

## 2025-01-05 SDOH — ECONOMIC STABILITY: FOOD INSECURITY: WITHIN THE PAST 12 MONTHS, THE FOOD YOU BOUGHT JUST DIDN'T LAST AND YOU DIDN'T HAVE MONEY TO GET MORE.: NEVER TRUE

## 2025-01-05 SDOH — SOCIAL STABILITY: SOCIAL NETWORK: HOW OFTEN DO YOU ATTEND MEETINGS OF THE CLUBS OR ORGANIZATIONS YOU BELONG TO?: NEVER

## 2025-01-05 SDOH — ECONOMIC STABILITY: FOOD INSECURITY: HOW HARD IS IT FOR YOU TO PAY FOR THE VERY BASICS LIKE FOOD, HOUSING, MEDICAL CARE, AND HEATING?: NOT VERY HARD

## 2025-01-05 SDOH — SOCIAL STABILITY: SOCIAL INSECURITY: HAS ANYONE EVER THREATENED TO HURT YOUR FAMILY OR YOUR PETS?: NO

## 2025-01-05 SDOH — SOCIAL STABILITY: SOCIAL INSECURITY: ARE YOU OR HAVE YOU BEEN THREATENED OR ABUSED PHYSICALLY, EMOTIONALLY, OR SEXUALLY BY ANYONE?: NO

## 2025-01-05 SDOH — SOCIAL STABILITY: SOCIAL INSECURITY: ARE YOU MARRIED, WIDOWED, DIVORCED, SEPARATED, NEVER MARRIED, OR LIVING WITH A PARTNER?: NEVER MARRIED

## 2025-01-05 SDOH — HEALTH STABILITY: MENTAL HEALTH
DO YOU FEEL STRESS - TENSE, RESTLESS, NERVOUS, OR ANXIOUS, OR UNABLE TO SLEEP AT NIGHT BECAUSE YOUR MIND IS TROUBLED ALL THE TIME - THESE DAYS?: NOT AT ALL

## 2025-01-05 SDOH — ECONOMIC STABILITY: INCOME INSECURITY: IN THE PAST 12 MONTHS HAS THE ELECTRIC, GAS, OIL, OR WATER COMPANY THREATENED TO SHUT OFF SERVICES IN YOUR HOME?: NO

## 2025-01-05 SDOH — ECONOMIC STABILITY: TRANSPORTATION INSECURITY: IN THE PAST 12 MONTHS, HAS LACK OF TRANSPORTATION KEPT YOU FROM MEDICAL APPOINTMENTS OR FROM GETTING MEDICATIONS?: NO

## 2025-01-05 SDOH — SOCIAL STABILITY: SOCIAL INSECURITY: WERE YOU ABLE TO COMPLETE ALL THE BEHAVIORAL HEALTH SCREENINGS?: YES

## 2025-01-05 SDOH — SOCIAL STABILITY: SOCIAL INSECURITY: WITHIN THE LAST YEAR, HAVE YOU BEEN AFRAID OF YOUR PARTNER OR EX-PARTNER?: NO

## 2025-01-05 SDOH — HEALTH STABILITY: MENTAL HEALTH: HOW MANY DRINKS CONTAINING ALCOHOL DO YOU HAVE ON A TYPICAL DAY WHEN YOU ARE DRINKING?: PATIENT DOES NOT DRINK

## 2025-01-05 SDOH — ECONOMIC STABILITY: HOUSING INSECURITY: IN THE PAST 12 MONTHS, HOW MANY TIMES HAVE YOU MOVED WHERE YOU WERE LIVING?: 0

## 2025-01-05 SDOH — HEALTH STABILITY: PHYSICAL HEALTH: ON AVERAGE, HOW MANY DAYS PER WEEK DO YOU ENGAGE IN MODERATE TO STRENUOUS EXERCISE (LIKE A BRISK WALK)?: 7 DAYS

## 2025-01-05 SDOH — SOCIAL STABILITY: SOCIAL INSECURITY: ABUSE: ADULT

## 2025-01-05 SDOH — SOCIAL STABILITY: SOCIAL INSECURITY: HAVE YOU HAD ANY THOUGHTS OF HARMING ANYONE ELSE?: NO

## 2025-01-05 SDOH — SOCIAL STABILITY: SOCIAL NETWORK: HOW OFTEN DO YOU ATTEND CHURCH OR RELIGIOUS SERVICES?: NEVER

## 2025-01-05 SDOH — SOCIAL STABILITY: SOCIAL INSECURITY: ARE THERE ANY APPARENT SIGNS OF INJURIES/BEHAVIORS THAT COULD BE RELATED TO ABUSE/NEGLECT?: NO

## 2025-01-05 SDOH — SOCIAL STABILITY: SOCIAL INSECURITY: DOES ANYONE TRY TO KEEP YOU FROM HAVING/CONTACTING OTHER FRIENDS OR DOING THINGS OUTSIDE YOUR HOME?: NO

## 2025-01-05 SDOH — SOCIAL STABILITY: SOCIAL NETWORK: HOW OFTEN DO YOU GET TOGETHER WITH FRIENDS OR RELATIVES?: MORE THAN THREE TIMES A WEEK

## 2025-01-05 ASSESSMENT — LIFESTYLE VARIABLES
SUBSTANCE_ABUSE_PAST_12_MONTHS: NO
HOW OFTEN DO YOU HAVE 6 OR MORE DRINKS ON ONE OCCASION: NEVER
HAVE YOU EVER FELT YOU SHOULD CUT DOWN ON YOUR DRINKING: NO
SKIP TO QUESTIONS 9-10: 1
PRESCIPTION_ABUSE_PAST_12_MONTHS: NO
HOW OFTEN DO YOU HAVE A DRINK CONTAINING ALCOHOL: NEVER
AUDIT-C TOTAL SCORE: 0
EVER HAD A DRINK FIRST THING IN THE MORNING TO STEADY YOUR NERVES TO GET RID OF A HANGOVER: NO
HOW MANY STANDARD DRINKS CONTAINING ALCOHOL DO YOU HAVE ON A TYPICAL DAY: PATIENT DOES NOT DRINK
AUDIT-C TOTAL SCORE: 0
SKIP TO QUESTIONS 9-10: 1
TOTAL SCORE: 0
HAVE PEOPLE ANNOYED YOU BY CRITICIZING YOUR DRINKING: NO
EVER FELT BAD OR GUILTY ABOUT YOUR DRINKING: NO
AUDIT-C TOTAL SCORE: 0

## 2025-01-05 ASSESSMENT — COGNITIVE AND FUNCTIONAL STATUS - GENERAL
DAILY ACTIVITIY SCORE: 24
MOBILITY SCORE: 24
PATIENT BASELINE BEDBOUND: NO
MOBILITY SCORE: 24
DAILY ACTIVITIY SCORE: 24

## 2025-01-05 ASSESSMENT — ACTIVITIES OF DAILY LIVING (ADL)
JUDGMENT_ADEQUATE_SAFELY_COMPLETE_DAILY_ACTIVITIES: YES
WALKS IN HOME: INDEPENDENT
PATIENT'S MEMORY ADEQUATE TO SAFELY COMPLETE DAILY ACTIVITIES?: YES
DRESSING YOURSELF: INDEPENDENT
LACK_OF_TRANSPORTATION: NO
HEARING - LEFT EAR: FUNCTIONAL
TOILETING: INDEPENDENT
HEARING - RIGHT EAR: FUNCTIONAL
ADEQUATE_TO_COMPLETE_ADL: YES
LACK_OF_TRANSPORTATION: NO
BATHING: INDEPENDENT
GROOMING: INDEPENDENT
FEEDING YOURSELF: INDEPENDENT

## 2025-01-05 ASSESSMENT — PAIN DESCRIPTION - ORIENTATION: ORIENTATION: LOWER

## 2025-01-05 ASSESSMENT — PAIN SCALES - GENERAL
PAINLEVEL_OUTOF10: 8
PAINLEVEL_OUTOF10: 0 - NO PAIN
PAINLEVEL_OUTOF10: 7
PAINLEVEL_OUTOF10: 6
PAINLEVEL_OUTOF10: 0 - NO PAIN

## 2025-01-05 ASSESSMENT — PAIN DESCRIPTION - LOCATION
LOCATION: ABDOMEN
LOCATION: BACK

## 2025-01-05 ASSESSMENT — PAIN - FUNCTIONAL ASSESSMENT
PAIN_FUNCTIONAL_ASSESSMENT: 0-10
PAIN_FUNCTIONAL_ASSESSMENT: 0-10

## 2025-01-05 ASSESSMENT — PAIN SCALES - WONG BAKER: WONGBAKER_NUMERICALRESPONSE: NO HURT

## 2025-01-05 NOTE — ED TRIAGE NOTES
Pt here from home with abd pain since Monday hx of kidney transplant x 2 pt on dialysis states last treatment yesterday aox4

## 2025-01-05 NOTE — H&P
History of Present Illness Note    History Of Present Illness  Manolo Bashir is a 69 y.o. male with a past medical history of ESRD (on dialysis; TThS schedule; s/p 2x renal transplant - 1992, 2013), now with impaired allograft function currently on immunosuppression (prednisone, tacrolimus), IgG and IgA lambda MGUS (recent hematologic eval including Bmbx with no e/o myeloma), T2DM, HTN, prostate cancer (s/p radical prostatectomy), HCV (s/p rx; PCR neg 10/2023), and gastroparesis admitted on 01/05/2025 with severe kidney pain and hematuria.     Patient says six days ago he started having pain and swelling in his LL abdomen over his transplanted kidney. He describes it as a 10/10 throbbing pain that radiates over the rest of his abdomen and is sometimes worse when he takes a deep breath. Around two days later he started to have blood in his urine. Patient reports he doesn't make much urine but will frequently leak when he gets up/down to go to the bathroom. He has had similar pain during episodes of kidney dysfunction in the past. Patient also reports he was recently sick over [place], during which time he experienced fevers/chills, NV, mild diarrhea and decreased appetite. Around this time he also missed about a weeks worth of his tacrolimus, prednisone, and some of his BP medications as he was switching insurances and was unable to refill his medications. He denies any blood in vomit or stool, leg swelling, LH/D, SOB, CP, though does endorse a recent dry cough when laying down at night.    The patient is on dialysis TThS via LUE AV graft with the last session yesterday (01/04) morning. Patient notes his recent dry weight is ~69Kg. The patient reports decreased urinary output over the past week and states he has not urinated since Sunday of last week, endorsing symptoms or urinary urgency/retention.     ED Course:  Patient arrived in the ED afebrile with /78. Labs on admission notable for WBC 4.0, Hgb  10.5, Cr 8.43, Bun 24, LFT's WNL. Straight cath was attempted but yielded no fluid for UA/culture. CT AP showed interval development of marked fat stranding adjacent to the left iliac fossa renal transplant, which appears edematous and  heterogeneous, new compared to 09/10/2024 CT. No perinephric fluid  collections. Findings concerning for urinary tract infection/pyelonephritis.     ED Interventions  Medications - No data to display    EKG  Encounter Date: 09/10/24   ECG 12 lead   Result Value    Ventricular Rate 94    Atrial Rate 94    HI Interval 174    QRS Duration 132    QT Interval 348    QTC Calculation(Bazett) 435    P Axis 83    R Axis -20    T Axis 51    QRS Count 15    Q Onset 217    P Onset 130    P Offset 175    T Offset 391    QTC Fredericia 404    Narrative     Poor data quality, interpretation may be adversely affected  Normal sinus rhythm  Nonspecific intraventricular block  Minimal voltage criteria for LVH, may be normal variant ( [place] product )  Abnormal ECG  When compared with ECG of 27-AUG-2024 16:45,  Nonspecific intraventricular block has replaced Incomplete left bundle branch block    See ED provider note for full interpretation and clinical correlation  Confirmed by Millicent Sheikh ([id number]) on 9/10/2024 11:09:03 PM        Review of Systems   All other systems reviewed and are negative.      Past Medical History    Past Medical History:   Diagnosis Date    Anemia     Arthritis     Cataract     Chronic kidney disease, stage 3 unspecified (Multi) 09/26/2018    Stage 3 chronic kidney disease    CKD (chronic kidney disease)     stage V    Cough 02/12/2024    COVID-19 06/18/2020    COVID-19 virus infection    Diabetes (Multi)     ESRD (end stage renal disease) (Multi)     Focal and segmental proliferative glomerulonephritis 12/23/2023    HTN (hypertension)     Hyperlipidemia     Other long term (current) drug therapy 07/20/2021    High risk medication use    Personal history of other diseases of the  circulatory system     Personal history of cardiac murmur    Personal history of other infectious and parasitic diseases 08/17/2015    History of hepatitis    Polyp, colonic 08/17/2023    Primary osteoarthritis of both ankles 08/17/2023    Prostate cancer (Multi)     Tubular adenoma of colon 08/17/2023    Unspecified kidney failure 08/17/2016    Renal failure       Home Medications  No medications prior to admission.       Surgical History    Past Surgical History:   Procedure Laterality Date    ILEOSTOMY  04/25/2017    Ileostomy    ILEOSTOMY CLOSURE  08/17/2015    Ileostomy Closure    OTHER SURGICAL HISTORY  04/21/2017    Right Hemicolectomy    OTHER SURGICAL HISTORY  08/17/2015    Arteriovenous Surgery Creation Of A-V Fistula    OTHER SURGICAL HISTORY  08/17/2015    Sigmoidoscopy (Fiberoptic, Therapeutic )    PROSTATECTOMY  10/11/2013    Prostatectomy Radical    TRANSPLANT, KIDNEY, OPEN  1992    TRANSPLANT, KIDNEY, OPEN  2013    US GUIDED PERCUTANEOUS BIOPSY RENAL LEFT Left 11/20/2023    US GUIDED PERCUTANEOUS BIOPSY RENAL LEFT 11/20/2023 Lou Rodgers MD [facility]    US GUIDED PERCUTANEOUS PERITONEAL OR RETROPERITONEAL FLUID COLLECTION DRAINAGE  10/20/2022    US GUIDED PERCUTANEOUS PERITONEAL OR RETROPERITONEAL FLUID COLLECTION DRAINAGE 10/20/2022 [facility] CLINICAL LEGACY       Allergies  No Known Allergies    Social History    Social History     Socioeconomic History    Marital status: Single     Spouse name: Not on file    Number of children: Not on file    Years of education: Not on file    Highest education level: Not on file   Occupational History    Not on file   Tobacco Use    Smoking status: Never     Passive exposure: Past    Smokeless tobacco: Never   Vaping Use    Vaping status: Never Used   Substance and Sexual Activity    Alcohol use: Never    Drug use: Not on file    Sexual activity: Defer   Other Topics Concern    Not on file   Social History Narrative    Not on file     Social Drivers of Health      Financial Resource Strain: Low Risk  (9/12/2024)    Overall Financial Resource Strain (CARDIA)     Difficulty of Paying Living Expenses: Not very hard   Food Insecurity: No Food Insecurity (9/12/2024)    Hunger Vital Sign     Worried About Running Out of Food in the Last Year: Never true     Ran Out of Food in the Last Year: Never true   Transportation Needs: No Transportation Needs (10/14/2024)    OASIS : Transportation     Lack of Transportation (Medical): No     Lack of Transportation (Non-Medical): No     Patient Unable or Declines to Respond: No   Physical Activity: Sufficiently Active (9/12/2024)    Exercise Vital Sign     Days of Exercise per Week: 7 days     Minutes of Exercise per Session: 60 min   Stress: No Stress Concern Present (9/12/2024)    [employer] - Occupational Stress Questionnaire     Feeling of Stress : Not at all   Social Connections: Feeling Socially Integrated (10/14/2024)    OASIS : Social Isolation     Frequency of experiencing loneliness or isolation: Never   Recent Concern: Social Connections - Socially Isolated (9/12/2024)    Social Connection and Isolation Panel [NHANES]     Frequency of Communication with Friends and Family: More than three times a week     Frequency of Social Gatherings with Friends and Family: More than three times a week     Attends Latter day Services: Never     Active Member of Clubs or Organizations: No     Attends Club or Organization Meetings: Never     Marital Status: Never    Intimate Partner Violence: Not At Risk (9/12/2024)    Humiliation, Afraid, Rape, and Kick questionnaire     Fear of Current or Ex-Partner: No     Emotionally Abused: No     Physically Abused: No     Sexually Abused: No   Housing Stability: Low Risk  (9/12/2024)    Housing Stability Vital Sign     Unable to Pay for Housing in the Last Year: No     Number of Times Moved in the Last Year: 0     Homeless in the Last Year: No       Family  History    Family History   Problem Relation Name Age of Onset    Bone cancer Mother      Other (corona's sarcome of the bone marrow) Mother      Prostate cancer Father      Diabetes Other Family Hist     Hypertension Other Family Hist        Medications  Scheduled medications    Continuous medications         Cardiac Hx:  Last echo: No results found for this or any previous visit.   Last EKG:   Encounter Date: 09/10/24   ECG 12 lead   Result Value    Ventricular Rate 94    Atrial Rate 94    CO Interval 174    QRS Duration 132    QT Interval 348    QTC Calculation(Bazett) 435    P Axis 83    R Axis -20    T Axis 51    QRS Count 15    Q Onset 217    P Onset 130    P Offset 175    T Offset 391    QTC Fredericia 404    Narrative     Poor data quality, interpretation may be adversely affected  Normal sinus rhythm  Nonspecific intraventricular block  Minimal voltage criteria for LVH, may be normal variant ( [place] product )  Abnormal ECG  When compared with ECG of 27-AUG-2024 16:45,  Nonspecific intraventricular block has replaced Incomplete left bundle branch block    See ED provider note for full interpretation and clinical correlation  Confirmed by Millicent Sheikh ([id number]) on 9/10/2024 11:09:03 PM        GI Hx:  Last EGD: No results found for this or any previous visit. No results found for this or any previous visit.  Last Colonoscopy: No results found for this or any previous visit. No results found for this or any previous visit.    Objective   Current Vitals  There were no vitals taken for this visit.   No intake or output data in the 24 hours ending 01/05/25 1611    Physical Exam  Constitutional:       General: He is not in acute distress.  HENT:      Head: Normocephalic and atraumatic.   Eyes:      Extraocular Movements: Extraocular movements intact.      Pupils: Pupils are equal, round, and reactive to light.   Cardiovascular:      Rate and Rhythm: Normal rate and regular rhythm.      Pulses: Normal pulses.       Heart sounds: Normal heart sounds.   Pulmonary:      Effort: Pulmonary effort is normal.      Breath sounds: Normal breath sounds.   Abdominal:      General: Bowel sounds are normal.      Palpations: Abdomen is soft.      Comments: TTP of LLQ over transplanted kidney. No significant swelling. No rebound or guarding   Musculoskeletal:         General: No swelling or tenderness.      Right lower leg: No edema.      Left lower leg: No edema.   Skin:     General: Skin is warm and dry.   Neurological:      General: No focal deficit present.      Mental Status: He is alert and oriented to person, place, and time.   Psychiatric:         Mood and Affect: Mood normal.         Behavior: Behavior normal.         Labs  CBC: WBC 4.0 , HGB 10.5, PLT 90  BMP: , K 4.8, Cl 96, HCO3 28, BUN 24, CR 8.43, Glu 218  LFTS: AST 16 , ALT 16, ALKPHOS 89 , TBILI 0.5 , DBILI 0.1  TROP: 52  BNP: 1,059  COAGS: PT 15.0 , PTT 32  , INR 1.3  UA:     ABG:    CALCIUM 9.5 MAG No results found for requested labs within last 365 days. ALB 3.7 LACTATE 0.7 PHOS No results found for requested labs within last 365 days. COVIDNo results found for requested labs within last 365 days.    Imaging  === 09/10/24 ===    XR CHEST 2 VIEWS    - Impression -  1. Slightly improved pulmonary interstitial edema.  2. Interval improvement, of right basilar opacity and associated air  bronchograms. Finding is consistent with pneumonia in the proper  clinical setting.    I personally reviewed the images/study and I agree with the findings  as stated by Laurie Pan MD (resident). This study was  interpreted at [facility],  [place], [place].    MACRO:  None    Signed by: James Prasad 9/10/2024 2:42 PM  Dictation workstation:   [id number]   === 01/05/25 ===    CT ABDOMEN PELVIS WO IV CONTRAST    - Impression -  1. Interval development of marked fat stranding adjacent to the left  iliac fossa renal transplant which appears  edematous and  heterogeneous, new compared to 09/10/2024 CT. No perinephric fluid  collections. Findings concerning for urinary tract  infection/pyelonephritis. Correlation with urinalysis is recommended.  2. Slight interval decrease in mild right pleural effusion with  interval resolution of previously noted small left pleural effusion.  Persistent bilateral ground-glass opacities with mild interlobular  septal thickening, likely pulmonary edema.  3. Mild splenomegaly measuring up to 13.5 cm in craniocaudal  dimension, which has improved compared to 14.5 cm on 09/10/2024 CT.  4. Additional findings as described above.    I personally reviewed the images/study and I agree with the findings  as stated by Kendrick Purdy MD. This study was interpreted at  [facility], [place], [place].    MACRO:  None.    Signed by: Hue Aguilar 1/5/2025 2:17 PM  Dictation workstation:   [id number]       Assessment/Plan     Manolo Bashir is a 69 y.o. male with a past medical history of ESRD (on dialysis; TThS schedule; s/p 2x renal transplant - 1992, 2013), now with impaired allograft function currently on immunosuppression (prednisone, tacrolimus), IgG and IgA lambda MGUS (recent hematologic eval including Bmbx with no e/o myeloma), T2DM, HTN, prostate cancer (s/p radical prostatectomy), HCV (s/p rx; PCR neg 10/2023), and gastroparesis admitted on 01/05/2025 with severe kidney pain, hematuria and CT imagining findings c/f pyelonephritis. Patient recently missed about a weeks worth of doses of his immunosuppressive regimen and experienced recent infectious symptoms over [place] (fevers/chills/NV/decreased appetite). Last HD session 01/04. S/p 1 dose CFTX in the ED. Transplant nephrology consulted and aware of patient. Recommending broadening antibiotics to Zosyn pending possible urine culture if enough sample can be obtained. Will continue prednisone/tacrolimus at scheduled dosing. Patient says he  took both medications earlier today.       #Hematuria and flank pain c/f Pyelnophritis  :: CTAP 01/05 with interval development of marked fat stranding adjacent to the left  iliac fossa renal transplant which appears edematous and heterogeneous, new compared to 09/10/2024 CT. No perinephric fluid collections. Findings concerning for urinary tract infection/pyelonephritis  ::Prior Ucx data unremarkable  ::WBC 4.0 on admission, on chronic immunosuppression, recent infectious symptoms  ::S/p 1 dose CFTX in the ED  Plan:  -Start zosyn for empiric coverage per tx nephrology  -Re attempt UA/culture  -Strict I/O's  -Tylenol for pain control      #ESRD   :: on iHD (TTS) s/p 2 time renal transplant (1992, 2013)  :: now with impaired allograft function currently on immunosuppression (prednisone, tacrolimus)  ::Last admit transplant nephrology recommended hold tacrolimus 1 mg BID and continue with prednisone 5 mg daily  ::Patient currently on the transplant list  - Consult general nephrology tomorrow for iHD (TTS)  - Consulted transplant nephrology, appreciate recs  - c/w home prednisone 5mg daily per tx nephrology  - c/w tacrolimus 0.5mg BID per tx nephrology  - AM tacro levels daily  - c/w home renal vitamins     #Hx Gastroparesis   #Nausea and vomiting   :: S/p GES on April 2024 revealing gastroparesis.   :: was seen at the GI clinic in 6/2024 recommended c/w Reglan dosing of 5mg before dinner and at bedtime and Pantoprazole 40 mg daily  ::treated with erythromycin last admit  Plan:  - c/w home PPI   - c/w metoclopramide prn     #DM2  :: Last A1c 9.1 (12/16/2024)  :: Home insulin (Glargine 18 units at bed time, Lisopro 2U TID with meals, SSI)  - started glargine 10 units nightly, Lispro 1U TID w/ meals  - SSI #1    #HTN  ::Home meds carvedilol 25 mg BID,  Nifedipine 60 mg daily, imdur 60mg daily  -C/w home nifedipine  -C/w home carvedilol  -c/w home imdur  -Start hydral 25mg TID PRN for SBP>170      #HLD  - c/w home asa and  pravastatin 10mg    #Anemia  #Chronic thrombocytopenia   :: Hb 10.5 on admission, at recent baseline  :: Most likely related to ESRD  ::Patient prescribe epoetin q 7 days  Plan:  - continue to monitor         Fluids: Replete PRN  Electrolytes: Keep mg >2, phos >3  and K >4  Nutrition:  No diet orders on file   Antimicrobials:   DVT PPX: Unfractionated Heparin 5000 q8  GI PPX: Pantoprazole  Bowel Care: N/A  Lines: PIV  Oxygen: Room Air  Drips:       Code Status: Full Code (confirmed on admission)   NOK:  Primary Emergency Contact: KETTY PLASENCIA, Erik Phone: [phone]    Patient and plan to be staffed with attending physician in the morning.          Mateo Hernández MD  Department of Internal Medicine, PGY-1

## 2025-01-05 NOTE — SIGNIFICANT EVENT
Senior Staffing Note:    Please see excellent intern H&P for full details.    Manolo Bashir is a 69 y.o. male with PMH ESRD 2/2 HTN (on dialysis via LUE AVG, TThS schedule; s/p 2x renal transplant - 1992, 2013), now with impaired allograft function currently on immunosuppression (prednisone, tacrolimus), IgG and IgA lambda MGUS (recent hematologic eval including Bmbx with no e/o myeloma), HTN, T2DM (A1c 9.1% 12/2024), prostate cancer (s/p radical prostatectomy), HCV (s/p rx; PCR neg 10/2023), and gastroparesis presenting with pain in transplanted kidney ongoing for the last week. Describes the pain as throbbing and pleuritic, notes it has woken him up from sleep a few times. He wears adult diapers as he drips urine all the time, and notes bright red blood mixed into the urine in his dipens ongoing for the last 5 days. No dysuria or burning sensation with the dribbling. Reports feeling generally unwell since [place]mastime, with subjective fever and chills, poor PO intake, and intermittent NBNB emesis. Also with dry nocturnal cough ongoing over the same timeframe. Otherwise denies CP, SOB, abd pain, diarrhea/constipation, melena/hematochezia. Of note, has been off all immunosuppression and antiHTN for approx 1 week due to issues with insurance and prescriptions.    Vitals upon presentation unremarkable. Labs notable for hyponatremia (133), elevated Cr to 8.43 (although ESRD on HD, last session 1/4 on schedule), leukopenia to 4.0, anemia to 10.5 (although baseline Hb lower at 8.0), and thrombocytopenia to 90 (appears chronic). CT AP w/IV contrast with findings c/f pyelonephritis (fat stranding adjacent to the left iliac fossa renal transplant which appears edematous and heterogeneous), splenomegaly (improved from prior), mild R pleural effusion (improved from prior) and persistent bilateral GGO with interlobular septal thickening thought to be 2/2 pulmonary edema. EKG was NSR with known LBBB. Was given 1x dose of CTX  and dilaudid for pain control in ED and admitted for further management.     Of note had recent hospital admission 9/2024 for hypertensive urgency iso medication nonadherence and RLL PNA for which he completed course of PNA and azithro.    Physical exam   GEN: conversant, NAD  HEENT: PERRL, EOMI, MMM  CV: S1, S2, RRR, no MRG  PULM: CTAB, normal WOB  ABD: soft, NT, ND  NEURO: AO x4, CN 2-12 grossly intact, no FND  EXT: no sig LE edema. LUE AVG with palpable thrill  PSYCH: appropriate affect    Assessment/Plan  Manolo Bashir is a 69 y.o. male with PMH ESRD 2/2 HTN (on dialysis via LUE AVG, TThS schedule; s/p 2x renal transplant - 1992, 2013), now with impaired allograft function currently on immunosuppression (prednisone, tacrolimus), T2DM (A1c 9.1% 12/2024), IgG and IgA lambda MGUS (recent hematologic eval including Bmbx with no e/o myeloma), HTN, prostate cancer (s/p radical prostatectomy), HCV (s/p rx; PCR neg 10/2023), and gastroparesis presenting with pain in transplanted kidney ongoing for the last week, found to have evidence of pyelonephritis on imaging. Pt afebrile and HDS on presentation with mild leukopenia. Received CTX in ED for pyelo, plan to switch to zosyn given immunosuppressed status and pending culture data. Per discussion with transplant nephro, will continue current immunosuppression regimen for now.     #Pyelonephritis   :: s/p 1 dose CTX in ED  :: prior UCX data unremarkable  -per discussion with transplant nephrology, switch to Zosyn given immunosuppressed status  [ ] follow UA with UCX (if able to provide sample - pt straight cath'd in ED with no urine)    #ESRD 2/2 HTN  #s/p renal transplant x2   :: HD TTS via LUE AVG  :: last HD 1/4 on schedule  -transplant nephrology c/s, per recs will c/w prednisone 5 mg daily and tacro 0.5 mg q12h  [ ] dialysis nephrology c/s in AM for HD  [ ] AM tacro levels     #HTN  -c/w home coreg 25 mg BID, imdur 60 mg daily, and nifedipine 60 mg BID  -was  previously on hydral 100 mg BID, since discontinued  -hydral 25 mg TID PRN SBP > 170    Remainder of plan as per H&P.    F: Caution in ESRD  E: PRN, caution in ESRD  N: Renal, diabetic    Lines: PIV  O2: RA  Abx: Zosyn (1/5-*)    DVT PPX: SQH  GI PPX: Home PPI    Code Status (confirmed on admission): FULL  Surrogate Decision Maker: Amalia Enriquez (friend) [phone]    To be formally staffed with [place] team attending in AM.    This patient was admitted by [place] Team, a medicine admission-only team. Overnight, the patient will be covered by NACR on Medicine Flex Team until signed out to Primary Team in AM. Please page Flex Team [phone] with any overnight patient questions or concerns.     Nadia Camarena MD  Internal Medicine PGY2

## 2025-01-05 NOTE — ED PROVIDER NOTES
"Emergency Department Provider Note          History of Present Illness     CC: Abdominal Pain     History provided by: Patient  Limitations to History: None    HPI:   Manolo Bashir is a 69 y.o.male with PMH ESRD on iHD following two renal transplants (1992, 2013) with impaired allograft function on immunosuppression (prednisone, tacrolimus), IgG and IgA lambda MGUS, HTN, T2DM, prostate cancer (s/p radical prostatectomy), HCV (s/p treatment), and gastroparesis, presenting to the Emergency Department for severe kidney pain and hematuria. The pain began approximately five to six days ago and has been accompanied by blood in the urine for the past three days. The patient describes the pain as severe, rating it a 10 out of 10 earlier today, reducing to 8-9 out of 10 after taking three Tylenol with minimal relief. The patient reports a history of similar pain during episodes of kidney dysfunction in the past. The patient is on dialysis via LUE fistula, with the last session reported as yesterday morning. There is no report of leg swelling, chest pain, or shortness of breath. The patient does report mild diarrhea concurrent with the onset of kidney symptoms. The patient has a history of non-compliance with medications, including prednisone and tacrolimus, due to a prescription plan change, leading to a week without the medications (missed all his meds last week). The patient reports decreased urinary output over the past week and states he has not urinated since Sunday of last week, adding, \"I feel like I got to pee but there aint nothing coming out.\"      Records Reviewed: Recent available ED and inpatient notes reviewed in EMR.    PMHx/PSHx:  Per HPI.   - has a past medical history of Anemia, Arthritis, Cataract, Chronic kidney disease, stage 3 unspecified (Multi), CKD (chronic kidney disease), Cough, COVID-19, Diabetes (Multi), ESRD (end stage renal disease) (Multi), Focal and segmental proliferative " glomerulonephritis, HTN (hypertension), Hyperlipidemia, Other long term (current) drug therapy, Personal history of other diseases of the circulatory system, Personal history of other infectious and parasitic diseases, Polyp, colonic, Primary osteoarthritis of both ankles, Prostate cancer (Multi), Tubular adenoma of colon, and Unspecified kidney failure.  - has a past surgical history that includes Prostatectomy (10/11/2013); Ileostomy (04/25/2017); Other surgical history (04/21/2017); Ileostomy closure (08/17/2015); Other surgical history (08/17/2015); Other surgical history (08/17/2015); US guided percutaneous peritoneal or retroperitoneal fluid collection drainage (10/20/2022); transplant, kidney, open (1992); transplant, kidney, open (2013); and US guided percutaneous biopsy renal left (Left, 11/20/2023).  - has Adenocarcinoma of prostate (Multi); Anemia of chronic disease; Atrophy of urethral cuff associated with artificial urinary sphincter (CMS-HCC); Chronic hepatitis C (Multi); Diabetes mellitus type 2 without retinopathy (Multi); Hyperlipidemia; GERD (gastroesophageal reflux disease); Goiter; Tertiary hyperparathyroidism (Multi); Immunosuppression; Kidney replaced by transplant (HHS-HCC); Male erectile disorder of organic origin; Essential hypertension; Peripheral vascular disease (CMS-HCC); Vitamin D deficiency; Aortic aneurysm (CMS-HCC); Pancytopenia; MGUS (monoclonal gammopathy of unknown significance); Pyelonephritis; Fluid overload, unspecified; Focal and segmental proliferative glomerulonephritis; Immunosuppressive management encounter following kidney transplant; Kidney transplanted (HHS-HCC); Acute renal failure superimposed on chronic kidney disease (CMS-HCC); Allergic conjunctivitis; Allergic reaction; Cellulitis; Cholecystitis; Chronic low back pain; Dehydration; Disease due to severe acute respiratory syndrome coronavirus 2 (SARS-CoV-2); Generalized hyperhidrosis; History of colonic polyps;  Immunodeficiency due to drugs (CODE); Long term (current) use of calcineurin inhibitor; Methicillin resistant Staphylococcus aureus infection; Personal history of COVID-19; Pleural effusion; Rash; Residual hemorrhoidal skin tags; Radiculitis; ESRD (end stage renal disease) on dialysis (Multi); Hypervolemia, unspecified hypervolemia type; Shortness of breath; Acute lower urinary tract infection; Blepharitis; Dyslipidemia; Symptoms involving urinary system; Pneumonia of both lungs due to infectious organism, unspecified part of lung; Pre-transplant evaluation for kidney transplant; Chronic heart failure with preserved ejection fraction; and Type 2 diabetes mellitus with stage 4 chronic kidney disease, with long-term current use of insulin (Multi) on their problem list.    Medications:  Current Outpatient Medications   Medication Instructions    acetaminophen (TYLENOL) 975 mg, oral, Every 6 hours PRN    aspirin 81 mg, oral, Daily    calcitriol (ROCALTROL) 0.5 mcg, oral, Daily    cinacalcet (SENSIPAR) 30 mg, oral, Daily    Easy Touch Alcohol Prep Pads pads, medicated     epoetin aida 10,000 unit/mL injection Inject 1 mL (10,000 Units) under the skin every 7 days. Do not start before April 17, 2024.    Gvoke HypoPen 1-Pack 1 mg, intramuscular, Every 15 min PRN    hydrALAZINE (APRESOLINE) 100 mg, oral, 2 times daily    imiquimod (Aldara) 5 % cream 1 packet, Topical, 3 times weekly    insulin glargine (LANTUS) 18 Units, subcutaneous, Every morning, Inject 20 units daily as directed    insulin lispro (HumaLOG KwikPen Insulin) 100 unit/mL injection 2 units with meals plus sliding scale up to 30 units daily    isosorbide mononitrate ER (IMDUR) 60 mg, oral, Daily    lancets (Unilet Super Thin Lancets) 30 gauge misc 3 times daily    lancing device misc use as directed    losartan (Cozaar) 25 mg tablet 1 tablet, Daily (0630)    metoclopramide (REGLAN) 5 mg, oral, 2 times daily, 1 tablet before dinner and 1 tablet at bedtime.  "(need OFFICE VISIT by end of october -early november per MD)    NIFEdipine ER (ADALAT CC) 60 mg, oral, 2 times daily, Do not crush, chew, or split.    pantoprazole (ProtoNix) 40 mg EC tablet Take 1 tablet (40 mg) by mouth once daily in the morning. Take before meals. Do not crush, chew, or split. Do not start before January 22, 2024.    pen needle, diabetic (TechLITE Pen Needle) 32 gauge x 5/32\" needle Use 4 a day as directed    pravastatin (PRAVACHOL) 10 mg, oral, Nightly    predniSONE (DELTASONE) 5 mg, oral, Daily    syringe with needle 3 mL 25 x 5/8\" syringe Use to inject epogen.    tacrolimus (PROGRAF) 0.5 mg, oral, 2 times daily    tacrolimus (Protopic) 0.1 % ointment Topical, 2 times daily    Unilet Super Thin Lancets 30 gauge misc 1 each, miscellaneous, 3 times daily    vitamin B complex-vitamin C-folic acid (Nephrocaps) 1 mg capsule 1 capsule, oral, Daily        Allergies:  Patient has no known allergies.    Social History:  - Tobacco:  reports that he has never smoked. He has been exposed to tobacco smoke. He has never used smokeless tobacco.   - Alcohol:  reports no history of alcohol use.   - Illicit Drugs:  Drug: Oxycodone.     ROS:  Per HPI.       Physical Exam     Triage Vitals:  T 36 °C (96.8 °F)  HR 71  /78  RR 18  O2 99 % None (Room air)    General: Awake, alert, in no acute distress  Eyes: Gaze conjugate.  No scleral icterus or injection  HENT: Normo-cephalic, atraumatic. No stridor  CV: Regular rate, regular rhythm. Radial pulses 2+ bilaterally  Resp: Breathing non-labored, speaking in full sentences.  Clear to auscultation bilaterally  GI: Soft, non-distended, LLQ tenderness to palpation, no rebound or guarding.  MSK/Extremities: No gross bony deformities. Moving all extremities  Skin: Warm. Appropriate color  Neuro: Alert. Oriented. Face symmetric. Speech is fluent.  Gross strength and sensation intact in b/l UE and LEs  Psych: Appropriate mood and affect          [place] Coma Scale " Score: 15                    Medical Decision Making & ED Course     EKG: EKG interpreted by myself. Please see ED Course for full interpretation.    Medical Decision Making   Manolo Bashir is a 69 y.o.male presenting to the Emergency Department for abdominal pain and decreased urine output.  On arrival, blood pressure 147/77, rest of vital signs within normal limits.  Afebrile for us.  On examination, patient is notable tenderness palpation of the left lower quadrant without rebound or guarding.  Lower concern for acute peritonitis today.  Given patient's history of renal transplant, basic labs and CT of the abdomen/pelvis were ordered.  Although patient CBC showed a white count of 4.0, lower concern for systemic infection chronic prednisone/tacrolimus therapy.  Hemoglobin stable at 10.5, lower concern for acute blood loss anemia.  Chemistries notable for a creatinine elevated 8.43 consistent with patient's ESRD, worse than patient's baseline.  This could be consistent with worsening renal failure in the setting of previous kidney transplant?.  Given Dilaudid in the Emergency Department for symptomatic control.  CT of the abdomen pelvis showed evidence of perinephric stranding consistent with likely UTI versus pyelonephritis.  No other acute intra-abdominal findings noted.  Attempting to obtain a urine today but straight cath was performed and no fluid came out.  Given patient's history of transplant and worsening kidney function today I think he would benefit from antibiotics and observation overnight as well as evaluation by the transplant medicine team.      ED Course as of 01/05/25 1611   Sun Jan 05, 2025   1029 ECG 12 lead  Interpreted by me.  1/5/2025 at 1027.  Sinus rhythm.  66 bpm.  , , QTc 438.  Left bundle branch block.  Appears similar to previous EKG from 10/10/2024. [MC]   1551 CT abdomen pelvis wo IV contrast [AS]      ED Course User Index  [AS] Santos Chavira MD  [MC] Chris HARRELL  MD Jermaine       Independent Result Review and Interpretation: Relevant laboratory and radiographic results were reviewed and independently interpreted by myself.  As necessary, they are commented on in the ED Course.    Chronic conditions affecting the patient's care: As documented above in MDM.        Disposition   Admit    Santos Chavira MD  Emergency Medicine PGY3      Procedures     Procedures ? SmartLinks last updated 1/5/2025 4:11 PM        Santos Chavira MD  Resident  01/05/25 2142

## 2025-01-06 ENCOUNTER — APPOINTMENT (OUTPATIENT)
Dept: UROLOGY | Facility: CLINIC | Age: 70
End: 2025-01-06
Payer: COMMERCIAL

## 2025-01-06 LAB
ALBUMIN SERPL BCP-MCNC: 3.3 G/DL (ref 3.4–5)
ANION GAP SERPL CALC-SCNC: 14 MMOL/L (ref 10–20)
BASOPHILS # BLD AUTO: 0.02 X10*3/UL (ref 0–0.1)
BASOPHILS NFR BLD AUTO: 0.4 %
BUN SERPL-MCNC: 35 MG/DL (ref 6–23)
CALCIUM SERPL-MCNC: 9.1 MG/DL (ref 8.6–10.6)
CHLORIDE SERPL-SCNC: 97 MMOL/L (ref 98–107)
CO2 SERPL-SCNC: 27 MMOL/L (ref 21–32)
CREAT SERPL-MCNC: 10.58 MG/DL (ref 0.5–1.3)
EGFRCR SERPLBLD CKD-EPI 2021: 5 ML/MIN/1.73M*2
EOSINOPHIL # BLD AUTO: 0.22 X10*3/UL (ref 0–0.7)
EOSINOPHIL NFR BLD AUTO: 4.9 %
ERYTHROCYTE [DISTWIDTH] IN BLOOD BY AUTOMATED COUNT: 14.6 % (ref 11.5–14.5)
GLUCOSE BLD MANUAL STRIP-MCNC: 108 MG/DL (ref 74–99)
GLUCOSE BLD MANUAL STRIP-MCNC: 148 MG/DL (ref 74–99)
GLUCOSE BLD MANUAL STRIP-MCNC: 154 MG/DL (ref 74–99)
GLUCOSE SERPL-MCNC: 173 MG/DL (ref 74–99)
HCT VFR BLD AUTO: 30.6 % (ref 41–52)
HGB BLD-MCNC: 10 G/DL (ref 13.5–17.5)
IMM GRANULOCYTES # BLD AUTO: 0.04 X10*3/UL (ref 0–0.7)
IMM GRANULOCYTES NFR BLD AUTO: 0.9 % (ref 0–0.9)
LYMPHOCYTES # BLD AUTO: 0.55 X10*3/UL (ref 1.2–4.8)
LYMPHOCYTES NFR BLD AUTO: 12.3 %
MAGNESIUM SERPL-MCNC: 1.88 MG/DL (ref 1.6–2.4)
MCH RBC QN AUTO: 27.8 PG (ref 26–34)
MCHC RBC AUTO-ENTMCNC: 32.7 G/DL (ref 32–36)
MCV RBC AUTO: 85 FL (ref 80–100)
MONOCYTES # BLD AUTO: 0.46 X10*3/UL (ref 0.1–1)
MONOCYTES NFR BLD AUTO: 10.3 %
NEUTROPHILS # BLD AUTO: 3.18 X10*3/UL (ref 1.2–7.7)
NEUTROPHILS NFR BLD AUTO: 71.2 %
NRBC BLD-RTO: 0 /100 WBCS (ref 0–0)
PHOSPHATE SERPL-MCNC: 4.3 MG/DL (ref 2.5–4.9)
PLATELET # BLD AUTO: 94 X10*3/UL (ref 150–450)
POTASSIUM SERPL-SCNC: 5 MMOL/L (ref 3.5–5.3)
RBC # BLD AUTO: 3.6 X10*6/UL (ref 4.5–5.9)
SODIUM SERPL-SCNC: 133 MMOL/L (ref 136–145)
TACROLIMUS BLD-MCNC: 2 NG/ML
TACROLIMUS BLD-MCNC: 2.2 NG/ML
WBC # BLD AUTO: 4.5 X10*3/UL (ref 4.4–11.3)

## 2025-01-06 PROCEDURE — 2500000004 HC RX 250 GENERAL PHARMACY W/ HCPCS (ALT 636 FOR OP/ED)

## 2025-01-06 PROCEDURE — 2500000002 HC RX 250 W HCPCS SELF ADMINISTERED DRUGS (ALT 637 FOR MEDICARE OP, ALT 636 FOR OP/ED)

## 2025-01-06 PROCEDURE — 2500000005 HC RX 250 GENERAL PHARMACY W/O HCPCS

## 2025-01-06 PROCEDURE — 36415 COLL VENOUS BLD VENIPUNCTURE: CPT

## 2025-01-06 PROCEDURE — 83735 ASSAY OF MAGNESIUM: CPT

## 2025-01-06 PROCEDURE — 80069 RENAL FUNCTION PANEL: CPT

## 2025-01-06 PROCEDURE — 80197 ASSAY OF TACROLIMUS: CPT

## 2025-01-06 PROCEDURE — 85025 COMPLETE CBC W/AUTO DIFF WBC: CPT

## 2025-01-06 PROCEDURE — 1210000001 HC SEMI-PRIVATE ROOM DAILY

## 2025-01-06 PROCEDURE — 2500000001 HC RX 250 WO HCPCS SELF ADMINISTERED DRUGS (ALT 637 FOR MEDICARE OP)

## 2025-01-06 PROCEDURE — 99223 1ST HOSP IP/OBS HIGH 75: CPT | Performed by: STUDENT IN AN ORGANIZED HEALTH CARE EDUCATION/TRAINING PROGRAM

## 2025-01-06 PROCEDURE — 82947 ASSAY GLUCOSE BLOOD QUANT: CPT

## 2025-01-06 RX ORDER — ONDANSETRON HYDROCHLORIDE 2 MG/ML
4 INJECTION, SOLUTION INTRAVENOUS ONCE
Status: COMPLETED | OUTPATIENT
Start: 2025-01-06 | End: 2025-01-06

## 2025-01-06 RX ORDER — INSULIN GLARGINE 100 [IU]/ML
10 INJECTION, SOLUTION SUBCUTANEOUS EVERY 24 HOURS
Status: DISCONTINUED | OUTPATIENT
Start: 2025-01-07 | End: 2025-01-07

## 2025-01-06 RX ORDER — CARVEDILOL 25 MG/1
25 TABLET ORAL 2 TIMES DAILY
COMMUNITY

## 2025-01-06 RX ORDER — NIFEDIPINE 30 MG/1
30 TABLET, FILM COATED, EXTENDED RELEASE ORAL ONCE
Status: COMPLETED | OUTPATIENT
Start: 2025-01-06 | End: 2025-01-06

## 2025-01-06 RX ORDER — HYDRALAZINE HYDROCHLORIDE 25 MG/1
25 TABLET, FILM COATED ORAL EVERY 8 HOURS PRN
Status: DISCONTINUED | OUTPATIENT
Start: 2025-01-06 | End: 2025-01-08 | Stop reason: HOSPADM

## 2025-01-06 RX ORDER — NIFEDIPINE 90 MG/1
90 TABLET, EXTENDED RELEASE ORAL 2 TIMES DAILY
Status: DISCONTINUED | OUTPATIENT
Start: 2025-01-06 | End: 2025-01-08 | Stop reason: HOSPADM

## 2025-01-06 RX ADMIN — NIFEDIPINE 60 MG: 60 TABLET, FILM COATED, EXTENDED RELEASE ORAL at 08:23

## 2025-01-06 RX ADMIN — CARVEDILOL 25 MG: 12.5 TABLET, FILM COATED ORAL at 08:22

## 2025-01-06 RX ADMIN — ACETAMINOPHEN 975 MG: 325 TABLET ORAL at 20:13

## 2025-01-06 RX ADMIN — PIPERACILLIN SODIUM AND TAZOBACTAM SODIUM 2.25 G: 2; .25 INJECTION, SOLUTION INTRAVENOUS at 08:22

## 2025-01-06 RX ADMIN — NIFEDIPINE 30 MG: 30 TABLET, FILM COATED, EXTENDED RELEASE ORAL at 10:39

## 2025-01-06 RX ADMIN — ISOSORBIDE MONONITRATE 60 MG: 30 TABLET, EXTENDED RELEASE ORAL at 08:22

## 2025-01-06 RX ADMIN — TACROLIMUS 0.5 MG: 0.5 CAPSULE ORAL at 06:00

## 2025-01-06 RX ADMIN — PIPERACILLIN SODIUM AND TAZOBACTAM SODIUM 2.25 G: 2; .25 INJECTION, SOLUTION INTRAVENOUS at 20:13

## 2025-01-06 RX ADMIN — HEPARIN SODIUM 5000 UNITS: 5000 INJECTION, SOLUTION INTRAVENOUS; SUBCUTANEOUS at 14:07

## 2025-01-06 RX ADMIN — METHYLPREDNISOLONE SODIUM SUCCINATE 125 MG: 125 INJECTION, POWDER, FOR SOLUTION INTRAMUSCULAR; INTRAVENOUS at 16:34

## 2025-01-06 RX ADMIN — INSULIN LISPRO 1 UNITS: 100 INJECTION, SOLUTION INTRAVENOUS; SUBCUTANEOUS at 16:32

## 2025-01-06 RX ADMIN — OXYCODONE HYDROCHLORIDE 5 MG: 5 TABLET ORAL at 06:00

## 2025-01-06 RX ADMIN — LIDOCAINE 4% 1 PATCH: 40 PATCH TOPICAL at 08:22

## 2025-01-06 RX ADMIN — ASCORBIC ACID, THIAMINE MONONITRATE,RIBOFLAVIN, NIACINAMIDE, PYRIDOXINE HYDROCHLORIDE, FOLIC ACID, CYANOCOBALAMIN, BIOTIN, CALCIUM PANTOTHENATE, 1 CAPSULE: 100; 1.5; 1.7; 20; 10; 1; 6000; 150000; 5 CAPSULE, LIQUID FILLED ORAL at 08:22

## 2025-01-06 RX ADMIN — CINACALCET HYDROCHLORIDE 30 MG: 30 TABLET, FILM COATED ORAL at 08:58

## 2025-01-06 RX ADMIN — NIFEDIPINE 90 MG: 90 TABLET, FILM COATED, EXTENDED RELEASE ORAL at 20:13

## 2025-01-06 RX ADMIN — ACETAMINOPHEN 975 MG: 325 TABLET ORAL at 08:22

## 2025-01-06 RX ADMIN — HEPARIN SODIUM 5000 UNITS: 5000 INJECTION, SOLUTION INTRAVENOUS; SUBCUTANEOUS at 21:50

## 2025-01-06 RX ADMIN — ACETAMINOPHEN 975 MG: 325 TABLET ORAL at 14:07

## 2025-01-06 RX ADMIN — ASPIRIN 81 MG 81 MG: 81 TABLET ORAL at 08:22

## 2025-01-06 RX ADMIN — CALCITRIOL 0.5 MCG: 0.5 CAPSULE ORAL at 08:23

## 2025-01-06 RX ADMIN — METOCLOPRAMIDE 5 MG: 10 TABLET ORAL at 16:33

## 2025-01-06 RX ADMIN — OXYCODONE HYDROCHLORIDE 5 MG: 5 TABLET ORAL at 20:12

## 2025-01-06 RX ADMIN — OXYCODONE HYDROCHLORIDE 5 MG: 5 TABLET ORAL at 10:41

## 2025-01-06 RX ADMIN — CARVEDILOL 25 MG: 12.5 TABLET, FILM COATED ORAL at 20:13

## 2025-01-06 RX ADMIN — ONDANSETRON 4 MG: 2 INJECTION INTRAMUSCULAR; INTRAVENOUS at 09:56

## 2025-01-06 RX ADMIN — INSULIN LISPRO 1 UNITS: 100 INJECTION, SOLUTION INTRAVENOUS; SUBCUTANEOUS at 08:31

## 2025-01-06 RX ADMIN — METOCLOPRAMIDE 5 MG: 10 TABLET ORAL at 08:41

## 2025-01-06 RX ADMIN — HEPARIN SODIUM 5000 UNITS: 5000 INJECTION, SOLUTION INTRAVENOUS; SUBCUTANEOUS at 06:00

## 2025-01-06 RX ADMIN — INSULIN LISPRO 1 UNITS: 100 INJECTION, SOLUTION INTRAVENOUS; SUBCUTANEOUS at 16:33

## 2025-01-06 RX ADMIN — PANTOPRAZOLE SODIUM 40 MG: 40 TABLET, DELAYED RELEASE ORAL at 06:00

## 2025-01-06 RX ADMIN — PRAVASTATIN SODIUM 10 MG: 10 TABLET ORAL at 20:13

## 2025-01-06 RX ADMIN — HYDRALAZINE HYDROCHLORIDE 25 MG: 25 TABLET ORAL at 03:49

## 2025-01-06 RX ADMIN — PREDNISONE 5 MG: 5 TABLET ORAL at 08:22

## 2025-01-06 RX ADMIN — TACROLIMUS 0.5 MG: 0.5 CAPSULE ORAL at 18:22

## 2025-01-06 ASSESSMENT — PAIN SCALES - GENERAL
PAINLEVEL_OUTOF10: 10 - WORST POSSIBLE PAIN
PAINLEVEL_OUTOF10: 8
PAINLEVEL_OUTOF10: 4
PAINLEVEL_OUTOF10: 3
PAINLEVEL_OUTOF10: 0 - NO PAIN

## 2025-01-06 ASSESSMENT — PAIN DESCRIPTION - LOCATION: LOCATION: ABDOMEN

## 2025-01-06 ASSESSMENT — PAIN - FUNCTIONAL ASSESSMENT
PAIN_FUNCTIONAL_ASSESSMENT: 0-10

## 2025-01-06 ASSESSMENT — PAIN DESCRIPTION - ORIENTATION: ORIENTATION: LEFT;LOWER

## 2025-01-06 ASSESSMENT — ACTIVITIES OF DAILY LIVING (ADL): LACK_OF_TRANSPORTATION: NO

## 2025-01-06 NOTE — CONSULTS
Reason For Consult  Allograft tenderness/gross hematuria    History Of Present Illness  Manolo Bashir is a 69 y.o. male presenting with LLQ allograft tenderness and gross hematuria.  He has ESKD attributed to hypertension since 1998 s/p kidney transplant #1 3/26/1992 that failed 11/13/2006), s/p kidney transplant #2 7/13/2013 which failed in May 2024 following which he has been on HD (TTS, [facility] via LUE AVG) . He also has known MGUS with IgG and IgA lambda (bone marrow bx 10/2023 with no plasma cell dyscrasia), DM2, HTN, prostate cancer s/p radical prostatectomy, baseline urinary incontinence, HCV s/p treatment (recent HCV PCR negative 7/2024).      He noted severe pain and swelling at his left lower quadrant at the second kidney transplant allograft area for about 6 days followed by gross hematuria about 2 days alter. Denies rivka fever or chills. He reports compliant with tacrolimus and prednisone to me but per H&P he missed about a weeks worth of his tacrolimus, prednisone, and some of his BP medications as he was switching insurances and was unable to refill his medications.   He makes minimal residual urine.  Denies dysuria, urgency or frequency.  On HD TTS - via LUE AVF - last dialysis was on Saturday.     Past Medical History  He has a past medical history of Anemia, Arthritis, Cataract, Chronic kidney disease, stage 3 unspecified (Multi) (09/26/2018), CKD (chronic kidney disease), Cough (02/12/2024), COVID-19 (06/18/2020), Diabetes (Multi), ESRD (end stage renal disease) (Multi), Focal and segmental proliferative glomerulonephritis (12/23/2023), HTN (hypertension), Hyperlipidemia, Other long term (current) drug therapy (07/20/2021), Personal history of other diseases of the circulatory system, Personal history of other infectious and parasitic diseases (08/17/2015), Polyp, colonic (08/17/2023), Primary osteoarthritis of both ankles (08/17/2023), Prostate cancer (Multi), Tubular adenoma of colon  (08/17/2023), and Unspecified kidney failure (08/17/2016).    Surgical History  He has a past surgical history that includes Prostatectomy (10/11/2013); Ileostomy (04/25/2017); Other surgical history (04/21/2017); Ileostomy closure (08/17/2015); Other surgical history (08/17/2015); Other surgical history (08/17/2015); US guided percutaneous peritoneal or retroperitoneal fluid collection drainage (10/20/2022); transplant, kidney, open (1992); transplant, kidney, open (2013); and US guided percutaneous biopsy renal left (Left, 11/20/2023).     Social History  He reports that he has never smoked. He has been exposed to tobacco smoke. He has never used smokeless tobacco.  Drug: Oxycodone. He reports that he does not drink alcohol.    Family History  Family History   Problem Relation Name Age of Onset    Bone cancer Mother      Other (corona's sarcome of the bone marrow) Mother      Prostate cancer Father      Diabetes Other Family Hist     Hypertension Other Family Hist         Allergies  Patient has no known allergies.    Review of Systems  Review of  14 systems was performed system by system. See HPI. Otherwise, the symptoms were negative.     Physical Exam  Vital signs - reviewed.   General Appearance - NAD, Good speech, oriented and alert  HEENT - Supple. Not pale. No jaundice.   CVS - RRR. Normal S1/S2. No murmur, click , rub or gallop  Lungs- clear to auscultation bilaterally  Abdomen - soft , not tender, no guarding, no rigidity. No hepatosplenomegaly. Normal bowel sounds. No masses and ascites. S/P Kidney transplant. Marked tenderness to palpation at LLQ allograft  Musculoskeletal /Extremities - no edema. Full ROM. No joint tenderness.   Neuro/Psych - appropriate mood and affect. Motor power V/V all extremities. CN I -XII were grossly intact.  Skin - No visible rash  Access - LUE AVF +thrill       Last Recorded Vitals  Blood pressure (!) 189/92, pulse 81, temperature 36.9 °C (98.4 °F), resp. rate 16, height 1.727  "m (5' 7.99\"), weight 71.7 kg (158 lb), SpO2 100%.    Relevant Results  Results for orders placed or performed during the hospital encounter of 01/05/25 (from the past 96 hours)   CBC and Auto Differential   Result Value Ref Range    WBC 4.0 (L) 4.4 - 11.3 x10*3/uL    nRBC 0.0 0.0 - 0.0 /100 WBCs    RBC 3.76 (L) 4.50 - 5.90 x10*6/uL    Hemoglobin 10.5 (L) 13.5 - 17.5 g/dL    Hematocrit 30.7 (L) 41.0 - 52.0 %    MCV 82 80 - 100 fL    MCH 27.9 26.0 - 34.0 pg    MCHC 34.2 32.0 - 36.0 g/dL    RDW 14.9 (H) 11.5 - 14.5 %    Platelets 90 (L) 150 - 450 x10*3/uL    Neutrophils % 65.7 40.0 - 80.0 %    Immature Granulocytes %, Automated 0.8 0.0 - 0.9 %    Lymphocytes % 16.4 13.0 - 44.0 %    Monocytes % 12.1 2.0 - 10.0 %    Eosinophils % 4.5 0.0 - 6.0 %    Basophils % 0.5 0.0 - 2.0 %    Neutrophils Absolute 2.61 1.20 - 7.70 x10*3/uL    Immature Granulocytes Absolute, Automated 0.03 0.00 - 0.70 x10*3/uL    Lymphocytes Absolute 0.65 (L) 1.20 - 4.80 x10*3/uL    Monocytes Absolute 0.48 0.10 - 1.00 x10*3/uL    Eosinophils Absolute 0.18 0.00 - 0.70 x10*3/uL    Basophils Absolute 0.02 0.00 - 0.10 x10*3/uL   Comprehensive metabolic panel   Result Value Ref Range    Glucose 218 (H) 74 - 99 mg/dL    Sodium 133 (L) 136 - 145 mmol/L    Potassium 4.8 3.5 - 5.3 mmol/L    Chloride 96 (L) 98 - 107 mmol/L    Bicarbonate 28 21 - 32 mmol/L    Anion Gap 14 10 - 20 mmol/L    Urea Nitrogen 24 (H) 6 - 23 mg/dL    Creatinine 8.43 (H) 0.50 - 1.30 mg/dL    eGFR 6 (L) >60 mL/min/1.73m*2    Calcium 9.5 8.6 - 10.6 mg/dL    Albumin 3.7 3.4 - 5.0 g/dL    Alkaline Phosphatase 89 33 - 136 U/L    Total Protein 7.7 6.4 - 8.2 g/dL    AST 16 9 - 39 U/L    Bilirubin, Total 0.5 0.0 - 1.2 mg/dL    ALT 16 10 - 52 U/L   Tacrolimus level   Result Value Ref Range    Tacrolimus  2.2 <=15.0 ng/mL   POCT GLUCOSE   Result Value Ref Range    POCT Glucose 222 (H) 74 - 99 mg/dL   POCT GLUCOSE   Result Value Ref Range    POCT Glucose 215 (H) 74 - 99 mg/dL   Magnesium   Result " Value Ref Range    Magnesium 1.88 1.60 - 2.40 mg/dL   Renal Function Panel   Result Value Ref Range    Glucose 173 (H) 74 - 99 mg/dL    Sodium 133 (L) 136 - 145 mmol/L    Potassium 5.0 3.5 - 5.3 mmol/L    Chloride 97 (L) 98 - 107 mmol/L    Bicarbonate 27 21 - 32 mmol/L    Anion Gap 14 10 - 20 mmol/L    Urea Nitrogen 35 (H) 6 - 23 mg/dL    Creatinine 10.58 (H) 0.50 - 1.30 mg/dL    eGFR 5 (L) >60 mL/min/1.73m*2    Calcium 9.1 8.6 - 10.6 mg/dL    Phosphorus 4.3 2.5 - 4.9 mg/dL    Albumin 3.3 (L) 3.4 - 5.0 g/dL   CBC and Auto Differential   Result Value Ref Range    WBC 4.5 4.4 - 11.3 x10*3/uL    nRBC 0.0 0.0 - 0.0 /100 WBCs    RBC 3.60 (L) 4.50 - 5.90 x10*6/uL    Hemoglobin 10.0 (L) 13.5 - 17.5 g/dL    Hematocrit 30.6 (L) 41.0 - 52.0 %    MCV 85 80 - 100 fL    MCH 27.8 26.0 - 34.0 pg    MCHC 32.7 32.0 - 36.0 g/dL    RDW 14.6 (H) 11.5 - 14.5 %    Platelets 94 (L) 150 - 450 x10*3/uL    Neutrophils % 71.2 40.0 - 80.0 %    Immature Granulocytes %, Automated 0.9 0.0 - 0.9 %    Lymphocytes % 12.3 13.0 - 44.0 %    Monocytes % 10.3 2.0 - 10.0 %    Eosinophils % 4.9 0.0 - 6.0 %    Basophils % 0.4 0.0 - 2.0 %    Neutrophils Absolute 3.18 1.20 - 7.70 x10*3/uL    Immature Granulocytes Absolute, Automated 0.04 0.00 - 0.70 x10*3/uL    Lymphocytes Absolute 0.55 (L) 1.20 - 4.80 x10*3/uL    Monocytes Absolute 0.46 0.10 - 1.00 x10*3/uL    Eosinophils Absolute 0.22 0.00 - 0.70 x10*3/uL    Basophils Absolute 0.02 0.00 - 0.10 x10*3/uL   POCT GLUCOSE   Result Value Ref Range    POCT Glucose 148 (H) 74 - 99 mg/dL   POCT GLUCOSE   Result Value Ref Range    POCT Glucose 108 (H) 74 - 99 mg/dL     *Note: Due to a large number of results and/or encounters for the requested time period, some results have not been displayed. A complete set of results can be found in Results Review.          Assessment/Plan   Manolo Bashir is a 69 y.o. male presenting with LLQ allograft tenderness and gross hematuria.  He has ESKD attributed to hypertension since  1998 s/p kidney transplant #1 3/26/1992 that failed 11/13/2006), s/p kidney transplant #2 7/13/2013 which failed in May 2024 following which he has been on HD (TTS, [facility] via LUE AVG) . He also has known MGUS with IgG and IgA lambda (bone marrow bx 10/2023 with no plasma cell dyscrasia), DM2, HTN, prostate cancer s/p radical prostatectomy, baseline urinary incontinence, HCV s/p treatment (recent HCV PCR negative 7/2024).      ESKD  - no urgent need for dialysis today. Next HD session tomorrow.    Graft intolerance syndrome/chronic rejection  - Solumedrol 125 mg/day x 3 days (1/6-1/8/25) then prednisone taper  - keep TAC 0.5 mg BID for now  - empiric antibiotics for at least 3 days    Hypertension  - Coreg 25 mg BID  - Nifedipine 90 mg BID  - Volume management during dialysis  - Low salt diet    CKD-MBD  - Calcitriol 0.5 mg/day  - Sensipar 30 mg/day  - Dietary phosphorus restriction    Heme - acceptable    Diabetes  - basal-bolus regimen per primary. May need further adjustment while on high dose steroid    Nutrition - renal vitamins, renal dialysis diet    Re-transplant  Evaluated by  on 7/10/24: reasonable candidate for 3rd kidney transplant.  No potential living donor.    I spent 60 minutes in the professional and overall care of this patient.    Betty Ireland MD

## 2025-01-06 NOTE — NURSING NOTE
Pt's blood pressure has been extremely elevated this shift. It has been around 200s/95s. Dr Chapo Ellis was first notified at 2107 about the BP using secure chat.  Came to bedside and assessed the pt. Upon his assessment  told his nurse just to monitor and inform him after next VS. This nurse took pt's VS around 0200. Pt's BP was 195/89. Pt refused any H/A or dizziness. Informed Dr Ellis about this abnormal VS around 0202.  again told thIS nurse to monitor because the pt is not symptomatic. Will continue to monitor.

## 2025-01-06 NOTE — CONSULTS
Wound Care Consult     Visit Date: 1/6/2025      Patient Name: Manolo Bashir         MRN: [id number]           YOB: 1955     Reason for Consult: scrotum/penis     Wound History: Manolo Bashir is a 69 y.o. male with a past medical history of ESRD (on dialysis; [facility] schedule; s/p 2x renal transplant - 1992, 2013), now with impaired allograft function currently on immunosuppression (prednisone, tacrolimus), IgG and IgA lambda MGUS (recent hematologic eval including Bmbx with no e/o myeloma), T2DM, HTN, prostate cancer (s/p radical prostatectomy), HCV (s/p rx; PCR neg 10/2023), and gastroparesis admitted on 01/05/2025 with severe kidney pain, hematuria and CT imagining findings c/f pyelonephritis.      Wound Assessment:  Wound 08/25/24 Scrotum Ventral (Active)   Wound Image   01/05/25 1954   Shape lesions 09/12/24 0755   Drainage Description None 08/26/24 1120   Drainage Amount Scant 01/05/25 2108   Dressing Open to air 01/05/25 2108   Dressing Changed New 08/26/24 1120   Dressing Status Other (Comment) 01/05/25 2108     Wound Team Summary Assessment/Plan: Per notes and photos, pt has condyloma/genital warts, likely r/t to his HCV and immunosuppression. These lesions have been present since at least August and appear much improved. Per primary RN, no drainage noted. Since warts do not represent wounds, Wound Care will sign off/complete consult and defer any long-term treatment to Dermatology (unknown if pt established or not). For the time being, barrier cream can be used to soothe. If excess moisture is present, InterDry can be used.      Provider, please review.      Savannah Jean RN, CWON  Wound/Ostomy Nurse  1/6/2025  1:19 PM

## 2025-01-06 NOTE — PROGRESS NOTES
Internal Medicine Progress Note        Subjective     Subjective   Overnight patient was hypertensive to SBP 190s and received PO hydralazine a few times. Pt was seen at the bedside. This morning, patient was resting comfortably in bed and continues to endorse left lower abdominal pain, same as yesterday. No repeat episodes of emesis. No other acute complaints.      Objective   Objective     Vitals: I/O:   Vitals:    01/06/25 0822   BP: (!) 189/92   Pulse: 81   Resp:    Temp:    SpO2:       24hr Min/Max:  Temp  Min: 36 °C (96.8 °F)  Max: 36.9 °C (98.4 °F)  Pulse  Min: 63  Max: 81  BP  Min: 121/75  Max: 198/92  Resp  Min: 16  Max: 18  SpO2  Min: 93 %  Max: 100 %   Intake/Output Summary (Last 24 hours) at 1/6/2025 0840  Last data filed at 1/6/2025 0400  Gross per 24 hour   Intake 250 ml   Output --   Net 250 ml         Physical Exam:   General: Awake, alert, not in any acute distress.   HEENT: no scleral icterus or conjunctivitis  Pulmonary: CTAB, normal respiratory effort, not on supplemental oxygen  Cardiac: RRR, no M/R/G, no JVD  Abdomen: Soft, non distended, TTP of LLQ over transplanted kidney. No significant swelling. No rebound or guarding   EXT: No peripheral edema, no asymmetry noted. LUE AV graft  MSK: No focal joint swelling noted  Skin: No rashes or wounds  Neuro: AOx4, moving all limbs spontaneously, follows commands  Psych: Coherent thought process, appropriate mood and affect    General Chemistry Labs    RFP  Results from last 72 hours   Lab Units 01/06/25  0553 01/05/25  1039   GLUCOSE mg/dL 173* 218*   SODIUM mmol/L 133* 133*   POTASSIUM mmol/L 5.0 4.8   CHLORIDE mmol/L 97* 96*   CO2 mmol/L 27 28   BUN mg/dL 35* 24*   CREATININE mg/dL 10.58* 8.43*   ANION GAP mmol/L 14 14   EGFR mL/min/1.73m*2 5* 6*   CALCIUM mg/dL 9.1 9.5   PHOSPHORUS mg/dL 4.3  --    MAGNESIUM mg/dL 1.88  --    ALBUMIN g/dL 3.3* 3.7   ALK PHOS U/L  --  89   ALT U/L  --  16   AST U/L  --  16   BILIRUBIN TOTAL mg/dL  --  0.5   PROTEIN  "TOTAL g/dL  --  7.7      CBC  Results from last 72 hours   Lab Units 01/06/25  0553 01/05/25  1039   WBC AUTO x10*3/uL 4.5 4.0*   HEMOGLOBIN g/dL 10.0* 10.5*   HEMATOCRIT % 30.6* 30.7*   MCV fL 85 82   MCH pg 27.8 27.9   MCHC g/dL 32.7 34.2   RDW % 14.6* 14.9*   PLATELETS AUTO x10*3/uL 94* 90*   NEUTROS PCT AUTO % 71.2 65.7   LYMPHS PCT AUTO % 12.3 16.4   MONOS PCT AUTO % 10.3 12.1   EOS PCT AUTO % 4.9 4.5     Coagulation Labs        No lab exists for component: \"RETICHBG\"  Cardiac Labs      Micro/ID:   Lab Results   Component Value Date    URINECULTURE CANCELED 07/19/2023    BLOODCULT No growth at 4 days -  FINAL REPORT 09/10/2024    BLOODCULT No growth at 4 days -  FINAL REPORT 09/10/2024       Scheduled Medications:  PRN Medications:  Continuous Medications:   acetaminophen, 975 mg, oral, TID  aspirin, 81 mg, oral, Daily  calcitriol, 0.5 mcg, oral, Daily  carvedilol, 25 mg, oral, BID  cinacalcet, 30 mg, oral, Daily  heparin (porcine), 5,000 Units, subcutaneous, q8h  insulin glargine, 10 Units, subcutaneous, q24h  insulin lispro, 0-5 Units, subcutaneous, TID AC  insulin lispro, 1 Units, subcutaneous, TID AC  isosorbide mononitrate ER, 60 mg, oral, Daily  lidocaine, 1 patch, transdermal, Daily  metoclopramide, 5 mg, oral, BID AC  NIFEdipine ER, 30 mg, oral, Once  NIFEdipine ER, 90 mg, oral, BID  pantoprazole, 40 mg, oral, Daily before breakfast  piperacillin-tazobactam, 2.25 g, intravenous, q12h  pravastatin, 10 mg, oral, Nightly  predniSONE, 5 mg, oral, Daily  tacrolimus, 0.5 mg, oral, q12h  vitamin B complex-vitamin C-folic acid, 1 capsule, oral, Daily     PRN medications: dextrose, dextrose, glucagon, glucagon, hydrALAZINE, oxyCODONE       Summary of key imaging results from the last 24 hours:    CT abdomen pelvis wo IV contrast    Result Date: 1/5/2025  Interpreted By:  Hue Aguilar and Nakamoto Kent STUDY: CT ABDOMEN PELVIS WO IV CONTRAST;  1/5/2025 1:13 pm   INDICATION: Signs/Symptoms:LLQ abd pain, hx " renal transplant.   COMPARISON: CT abdomen and pelvis 09/10/2024   ACCESSION NUMBER(S): [id number]   ORDERING CLINICIAN: SENIA CAGE   TECHNIQUE: Contiguous axial images of the abdomen and pelvis were obtained without intravenous contrast. Coronal and sagittal reformatted images were reconstructed from the axial data.   FINDINGS: LOWER CHEST: Similar size right pleural effusion with associated atelectasis. Decreased size/near resolution of the left pleural effusion. There is decreased ground-glass opacities within the bilateral lung bases. The heart is enlarged in size. No pericardial effusion.   Note: The absence of intravenous contrast limits evaluation of the solid organs and vasculature.   ABDOMEN/PELVIS:   ABDOMINAL WALL: There is mild abdominal wall and proximal lower extremity edema.   LIVER: The liver is borderline enlarged measuring 18.5 cm in craniocaudal dimension which is similar compared to prior exam. No focal hepatic lesions within the limitations of noncontrast exam.   BILE DUCTS: No significant intrahepatic or extrahepatic dilatation.   GALLBLADDER: Status post cholecystectomy   PANCREAS: No significant abnormality.   SPLEEN: The spleen is borderline enlarged measuring 13.5 cm in the craniocaudal dimension, previously 14.5 cm on 09/10/2024 CT..   ADRENALS: No significant abnormality.   KIDNEYS, URETERS, BLADDER: Redemonstrated bilateral atrophic kidneys with scattered calcifications. Redemonstrated postsurgical changes of left iliac fossa transplant kidney without evidence of hydro nephrosis or nephroureterolithiasis. Interval development of marked fat stranding adjacent to the left iliac fossa renal transplant which appears edematous and heterogeneous, new compared to 09/10/2024 CT. No perinephric fluid collections. . Urinary bladder is decompressed, limiting evaluation. However there is perivesicular fat stranding concerning for urinary tract infection.   REPRODUCTIVE ORGANS: No  significant abnormality.   VESSELS: Marked calcified atherosclerosis of the internal iliac arteries as well as the common femoral superficial femoral and deep profunda arteries.   RETROPERITONEUM/LYMPH NODES: No enlarged lymph nodes. No acute retroperitoneal abnormality.   BOWEL/MESENTERY/PERITONEUM: The stomach is slightly distended with enteric contents. Otherwise appears within normal limits.. There are postsurgical changes from prior small bowel resection and ileostomy. There is colonic diverticulosis without evidence of diverticulitis. The sutures appear intact. No inflammatory bowel wall thickening or dilatation. Appendix is surgically removed.   No ascites, free air, or fluid collection.     MUSCULOSKELETAL: No acute osseous abnormality. No suspicious osseous lesion. Degenerative changes of the lower thoracic and lumbar spine are noted and unchanged. Redemonstrated sclerotic focus within the S2 vertebral body that likely represents a bone island.       1. Interval development of marked fat stranding adjacent to the left iliac fossa renal transplant which appears edematous and heterogeneous, new compared to 09/10/2024 CT. No perinephric fluid collections. Findings concerning for urinary tract infection/pyelonephritis. Correlation with urinalysis is recommended. 2. Slight interval decrease in mild right pleural effusion with interval resolution of previously noted small left pleural effusion. Persistent bilateral ground-glass opacities with mild interlobular septal thickening, likely pulmonary edema. 3. Mild splenomegaly measuring up to 13.5 cm in craniocaudal dimension, which has improved compared to 14.5 cm on 09/10/2024 CT. 4. Additional findings as described above.   I personally reviewed the images/study and I agree with the findings as stated by Kendrick Purdy MD. This study was interpreted at [facility], [place], [place].   MACRO: None.   Signed by: Hue Aguilar 1/5/2025  2:17 PM Dictation workstation:   [id number]       Assessment/Plan   Assessment and Plan    Manolo Bashir is a 69 y.o. male with a past medical history of ESRD (on dialysis; TThS schedule; s/p 2x renal transplant - 1992, 2013), now with impaired allograft function currently on immunosuppression (prednisone, tacrolimus), IgG and IgA lambda MGUS (recent hematologic eval including Bmbx with no e/o myeloma), T2DM, HTN, prostate cancer (s/p radical prostatectomy), HCV (s/p rx; PCR neg 10/2023), and gastroparesis admitted on 01/05/2025 with severe kidney pain, hematuria and CT imagining findings c/f pyelonephritis. Patient recently missed about a weeks worth of doses of his immunosuppressive regimen and experienced recent infectious symptoms over abdoulaye (fevers/chills/NV/decreased appetite). Last HD session 01/04. S/p 1 dose CFTX in the ED. Transplant nephrology consulted and aware of patient, will follow recs. Will continue empiric treatment with Zosyn.     Updates 01/06/25  - Bladder scan to assess for presence of urine   - Straight cath for UA/Urine culture   - Increase Nifedipine to 90mg daily for optimizing blood pressure    #Hematuria and flank pain c/f Pyelnophritis  :: CTAP 01/05 with interval development of marked fat stranding adjacent to the left  iliac fossa renal transplant which appears edematous and heterogeneous, new compared to 09/10/2024 CT. No perinephric fluid collections. Findings concerning for urinary tract infection/pyelonephritis  ::Prior Ucx data unremarkable  ::WBC 4.0 on admission, on chronic immunosuppression, recent infectious symptoms  ::S/p 1 dose CFTX in the ED  Plan:  -Continue zosyn for empiric coverage per tx nephrology  -Bladder scan to assess for presence of urine   - traight cath for UA/Urine culture   -Re attempt UA/culture  -Strict I/O's  -Tylenol for pain control     #ESRD   :: on iHD (TTS) s/p 2 time renal transplant (1992, 2013)  :: now with impaired allograft function  currently on immunosuppression (prednisone, tacrolimus)  ::Last admit transplant nephrology recommended hold tacrolimus 1 mg BID and continue with prednisone 5 mg daily  ::Patient currently on the transplant list  - Consult general nephrology tomorrow for iHD (TTS)?  - Transplant nephrology consulted, appreciate recs  - c/w home prednisone 5mg daily per tx nephrology  - c/w tacrolimus 0.5mg BID per tx nephrology  - AM tacro levels daily  - c/w home renal vitamins     #Hx Gastroparesis   #Nausea and vomiting   :: S/p GES on April 2024 revealing gastroparesis.   :: was seen at the GI clinic in 6/2024 recommended c/w Reglan dosing of 5mg before dinner and at bedtime and Pantoprazole 40 mg daily  ::treated with erythromycin last admit  Plan:  - c/w home PPI   - c/w metoclopramide prn     #DM2  :: Last A1c 9.1 (12/16/2024)  :: Home insulin (Glargine 18 units at bed time, Lisopro 2U TID with meals, SSI)  - started glargine 10 units nightly, Lispro 1U TID w/ meals  - SSI #1  - CTM POC glucose      #HTN  ::Home meds carvedilol 25 mg BID,  Nifedipine 60 mg daily, imdur 60mg daily  - Increase Nifedipine to 90mg daily for optimizing blood pressure  -C/w home carvedilol  -c/w home imdur  -Continue hydral 25mg TID PRN for SBP>170      #HLD  - c/w home asa and pravastatin 10mg     #Anemia  #Chronic thrombocytopenia   :: Hb 10.5 on admission, at recent baseline  :: Most likely related to ESRD  ::Patient prescribe epoetin q 7 days  Plan:  - continue to monitor     Medical Check List   FEN  -Fluids: PRN   -Electrolytes: PRN, with goals of Mg >2, K>4  -Nutrition: Adult diet Renal, Consistent Carb; CCD 60 gm/meal; Potassium Restricted 2 gm (50mEq); 2 - 3 grams Sodium  Prophylaxis:  -DVT ppx: Unfractionated Heparin 5000 q8h  -GI ppx/Bowel care: PPI  -Abx: Zosyn  -Pain regimen: Tylenol TID  Hardware:  -Drains: None  -Lines: PIV  Social:  -Code: Full Code  -NOK/Surrogate Decision Maker: KETTY PLASENCIA Home Phone: [phone]      Patient assessment and plan staffed with the attending physician on service, Dr. Philippe.    Mara Griffin MD  Internal Medicine PGY-1  1/6/2025    Disclaimer: Documentation completed with the information available at the time of input. The times in the chart may not be reflective of actual patient care times, interventions, or procedures. Documentation occurs after the physical care of the patient.

## 2025-01-06 NOTE — PROGRESS NOTES
Pharmacy Admission Order Reconciliation Review    Manolo Bashir is a 69 y.o. male admitted for Pyelonephritis. Pharmacy reviewed the patient's unreconciled admission medications.    Prior to admission medications that were reviewed and acted on by the pharmacist include:  Carvedilol   These medications have been reconciled.     Any other unreconcilied medications have been addressed and will be ordered or held by the patient's medical team. Medications addressed by the pharmacist may be added or changed by the patient's medical team at any time.    Davian Chowdary, PharmD  Transitions of Care Pharmacist  [facility]  Please reach out via Secure Chat for questions

## 2025-01-06 NOTE — CARE PLAN
Problem: Pain - Adult  Goal: Verbalizes/displays adequate comfort level or baseline comfort level  Outcome: Progressing     Problem: Safety - Adult  Goal: Free from fall injury  Outcome: Progressing     Problem: Discharge Planning  Goal: Discharge to home or other facility with appropriate resources  Outcome: Progressing     Problem: Chronic Conditions and Co-morbidities  Goal: Patient's chronic conditions and co-morbidity symptoms are monitored and maintained or improved  Outcome: Progressing   The patient's goals for the shift include to feel better    The clinical goals for the shift include Pt will reamin HDS throughout shift

## 2025-01-06 NOTE — CARE PLAN
The patient's goals for the shift include to feel better    The clinical goals for the shift include to be pain free      Problem: Pain - Adult  Goal: Verbalizes/displays adequate comfort level or baseline comfort level  Outcome: Progressing     Problem: Safety - Adult  Goal: Free from fall injury  Outcome: Progressing     Problem: Discharge Planning  Goal: Discharge to home or other facility with appropriate resources  Outcome: Progressing     Problem: Chronic Conditions and Co-morbidities  Goal: Patient's chronic conditions and co-morbidity symptoms are monitored and maintained or improved  Outcome: Progressing

## 2025-01-06 NOTE — PROGRESS NOTES
01/06/25 0912   Discharge Planning   Living Arrangements Children  (Home with dtr Rylee.)   Support Systems Children;Friends/neighbors   Assistance Needed None   Type of Residence Private residence   Who is requesting discharge planning? Patient   Home or Post Acute Services None   Expected Discharge Disposition Home   Does the patient need discharge transport arranged? No  (Pt will call his dtr.)   RoundTrip coordination needed? No   Has discharge transport been arranged? No   Financial Resource Strain   How hard is it for you to pay for the very basics like food, housing, medical care, and heating? Not very   Housing Stability   In the last 12 months, was there a time when you were not able to pay the mortgage or rent on time? N   At any time in the past 12 months, were you homeless or living in a shelter (including now)? N   Transportation Needs   In the past 12 months, has lack of transportation kept you from medical appointments or from getting medications? no   In the past 12 months, has lack of transportation kept you from meetings, work, or from getting things needed for daily living? No   Intensity of Service   Intensity of Service 0-30 min     Assessment Note:  Met with pt and introduced myself as care coordinator and member of the Care Transitions team for discharge planning.   Pt states he was independent prior to admission.  Pt's dtr or friend Amalia provide transport to [place] [facility].  Pt's address, phone number and contact information was verified.  Pt does not have any questions/concerns at this time.     Previous Home Care: None  DME: Glucometer.  Pharmacy: [facility].  Falls: Denies.  PCP: ANTOINE Hutton (last visit was 1 month ago).  Dialysis: [facility] at 6:30am.  Pt arranges for rides (family).    Linh Bueno MSN, RN-BC  Transitional Care Coordinator (TCC)  [phone]

## 2025-01-07 ENCOUNTER — APPOINTMENT (OUTPATIENT)
Dept: DIALYSIS | Facility: HOSPITAL | Age: 70
End: 2025-01-07
Payer: MEDICARE

## 2025-01-07 PROBLEM — T86.11: Status: ACTIVE | Noted: 2025-01-07

## 2025-01-07 LAB
ALBUMIN SERPL BCP-MCNC: 3.2 G/DL (ref 3.4–5)
ANION GAP SERPL CALC-SCNC: 19 MMOL/L (ref 10–20)
BASOPHILS # BLD AUTO: 0 X10*3/UL (ref 0–0.1)
BASOPHILS NFR BLD AUTO: 0 %
BUN SERPL-MCNC: 50 MG/DL (ref 6–23)
CALCIUM SERPL-MCNC: 9 MG/DL (ref 8.6–10.6)
CHLORIDE SERPL-SCNC: 94 MMOL/L (ref 98–107)
CO2 SERPL-SCNC: 23 MMOL/L (ref 21–32)
CREAT SERPL-MCNC: 11.66 MG/DL (ref 0.5–1.3)
EGFRCR SERPLBLD CKD-EPI 2021: 4 ML/MIN/1.73M*2
EOSINOPHIL # BLD AUTO: 0 X10*3/UL (ref 0–0.7)
EOSINOPHIL NFR BLD AUTO: 0 %
ERYTHROCYTE [DISTWIDTH] IN BLOOD BY AUTOMATED COUNT: 14.6 % (ref 11.5–14.5)
GLUCOSE BLD MANUAL STRIP-MCNC: 118 MG/DL (ref 74–99)
GLUCOSE BLD MANUAL STRIP-MCNC: 234 MG/DL (ref 74–99)
GLUCOSE BLD MANUAL STRIP-MCNC: 276 MG/DL (ref 74–99)
GLUCOSE SERPL-MCNC: 287 MG/DL (ref 74–99)
HBV SURFACE AB SER-ACNC: 3.4 MIU/ML
HBV SURFACE AG SERPL QL IA: NONREACTIVE
HCT VFR BLD AUTO: 30.9 % (ref 41–52)
HGB BLD-MCNC: 10.3 G/DL (ref 13.5–17.5)
IMM GRANULOCYTES # BLD AUTO: 0.03 X10*3/UL (ref 0–0.7)
IMM GRANULOCYTES NFR BLD AUTO: 1.2 % (ref 0–0.9)
LYMPHOCYTES # BLD AUTO: 0.3 X10*3/UL (ref 1.2–4.8)
LYMPHOCYTES NFR BLD AUTO: 11.8 %
MAGNESIUM SERPL-MCNC: 1.89 MG/DL (ref 1.6–2.4)
MCH RBC QN AUTO: 27.5 PG (ref 26–34)
MCHC RBC AUTO-ENTMCNC: 33.3 G/DL (ref 32–36)
MCV RBC AUTO: 83 FL (ref 80–100)
MONOCYTES # BLD AUTO: 0.09 X10*3/UL (ref 0.1–1)
MONOCYTES NFR BLD AUTO: 3.5 %
NEUTROPHILS # BLD AUTO: 2.13 X10*3/UL (ref 1.2–7.7)
NEUTROPHILS NFR BLD AUTO: 83.5 %
NRBC BLD-RTO: 0 /100 WBCS (ref 0–0)
PHOSPHATE SERPL-MCNC: 4.8 MG/DL (ref 2.5–4.9)
PLATELET # BLD AUTO: 112 X10*3/UL (ref 150–450)
POTASSIUM SERPL-SCNC: 6.4 MMOL/L (ref 3.5–5.3)
RBC # BLD AUTO: 3.74 X10*6/UL (ref 4.5–5.9)
SODIUM SERPL-SCNC: 130 MMOL/L (ref 136–145)
TACROLIMUS BLD-MCNC: <2 NG/ML
WBC # BLD AUTO: 2.6 X10*3/UL (ref 4.4–11.3)

## 2025-01-07 PROCEDURE — 5A1D70Z PERFORMANCE OF URINARY FILTRATION, INTERMITTENT, LESS THAN 6 HOURS PER DAY: ICD-10-PCS | Performed by: INTERNAL MEDICINE

## 2025-01-07 PROCEDURE — 2500000004 HC RX 250 GENERAL PHARMACY W/ HCPCS (ALT 636 FOR OP/ED)

## 2025-01-07 PROCEDURE — 90935 HEMODIALYSIS ONE EVALUATION: CPT | Performed by: STUDENT IN AN ORGANIZED HEALTH CARE EDUCATION/TRAINING PROGRAM

## 2025-01-07 PROCEDURE — 2500000004 HC RX 250 GENERAL PHARMACY W/ HCPCS (ALT 636 FOR OP/ED): Mod: JZ,TB | Performed by: STUDENT IN AN ORGANIZED HEALTH CARE EDUCATION/TRAINING PROGRAM

## 2025-01-07 PROCEDURE — 36415 COLL VENOUS BLD VENIPUNCTURE: CPT

## 2025-01-07 PROCEDURE — 2500000002 HC RX 250 W HCPCS SELF ADMINISTERED DRUGS (ALT 637 FOR MEDICARE OP, ALT 636 FOR OP/ED)

## 2025-01-07 PROCEDURE — 80197 ASSAY OF TACROLIMUS: CPT

## 2025-01-07 PROCEDURE — 82947 ASSAY GLUCOSE BLOOD QUANT: CPT

## 2025-01-07 PROCEDURE — 8010000001 HC DIALYSIS - HEMODIALYSIS PER DAY

## 2025-01-07 PROCEDURE — 1210000001 HC SEMI-PRIVATE ROOM DAILY

## 2025-01-07 PROCEDURE — 83735 ASSAY OF MAGNESIUM: CPT

## 2025-01-07 PROCEDURE — 85025 COMPLETE CBC W/AUTO DIFF WBC: CPT

## 2025-01-07 PROCEDURE — 80069 RENAL FUNCTION PANEL: CPT

## 2025-01-07 PROCEDURE — 2500000001 HC RX 250 WO HCPCS SELF ADMINISTERED DRUGS (ALT 637 FOR MEDICARE OP)

## 2025-01-07 PROCEDURE — 87340 HEPATITIS B SURFACE AG IA: CPT | Performed by: STUDENT IN AN ORGANIZED HEALTH CARE EDUCATION/TRAINING PROGRAM

## 2025-01-07 PROCEDURE — 86706 HEP B SURFACE ANTIBODY: CPT | Performed by: STUDENT IN AN ORGANIZED HEALTH CARE EDUCATION/TRAINING PROGRAM

## 2025-01-07 PROCEDURE — 99232 SBSQ HOSP IP/OBS MODERATE 35: CPT

## 2025-01-07 RX ORDER — INSULIN GLARGINE 100 [IU]/ML
14 INJECTION, SOLUTION SUBCUTANEOUS EVERY 24 HOURS
Status: DISCONTINUED | OUTPATIENT
Start: 2025-01-07 | End: 2025-01-08 | Stop reason: HOSPADM

## 2025-01-07 RX ADMIN — CINACALCET HYDROCHLORIDE 30 MG: 30 TABLET, FILM COATED ORAL at 13:05

## 2025-01-07 RX ADMIN — TACROLIMUS 0.5 MG: 0.5 CAPSULE ORAL at 18:40

## 2025-01-07 RX ADMIN — CALCITRIOL 0.5 MCG: 0.5 CAPSULE ORAL at 13:05

## 2025-01-07 RX ADMIN — ASPIRIN 81 MG 81 MG: 81 TABLET ORAL at 13:04

## 2025-01-07 RX ADMIN — CARVEDILOL 25 MG: 12.5 TABLET, FILM COATED ORAL at 13:04

## 2025-01-07 RX ADMIN — HEPARIN SODIUM 5000 UNITS: 5000 INJECTION, SOLUTION INTRAVENOUS; SUBCUTANEOUS at 13:11

## 2025-01-07 RX ADMIN — PIPERACILLIN SODIUM AND TAZOBACTAM SODIUM 2.25 G: 2; .25 INJECTION, SOLUTION INTRAVENOUS at 13:05

## 2025-01-07 RX ADMIN — ASCORBIC ACID, THIAMINE MONONITRATE,RIBOFLAVIN, NIACINAMIDE, PYRIDOXINE HYDROCHLORIDE, FOLIC ACID, CYANOCOBALAMIN, BIOTIN, CALCIUM PANTOTHENATE, 1 CAPSULE: 100; 1.5; 1.7; 20; 10; 1; 6000; 150000; 5 CAPSULE, LIQUID FILLED ORAL at 13:06

## 2025-01-07 RX ADMIN — ISOSORBIDE MONONITRATE 60 MG: 30 TABLET, EXTENDED RELEASE ORAL at 13:04

## 2025-01-07 RX ADMIN — INSULIN LISPRO 1 UNITS: 100 INJECTION, SOLUTION INTRAVENOUS; SUBCUTANEOUS at 13:06

## 2025-01-07 RX ADMIN — INSULIN LISPRO 2 UNITS: 100 INJECTION, SOLUTION INTRAVENOUS; SUBCUTANEOUS at 16:54

## 2025-01-07 RX ADMIN — NIFEDIPINE 90 MG: 90 TABLET, FILM COATED, EXTENDED RELEASE ORAL at 13:05

## 2025-01-07 RX ADMIN — METOCLOPRAMIDE 5 MG: 10 TABLET ORAL at 05:42

## 2025-01-07 RX ADMIN — HEPARIN SODIUM 5000 UNITS: 5000 INJECTION, SOLUTION INTRAVENOUS; SUBCUTANEOUS at 05:46

## 2025-01-07 RX ADMIN — PIPERACILLIN SODIUM AND TAZOBACTAM SODIUM 2.25 G: 2; .25 INJECTION, SOLUTION INTRAVENOUS at 20:39

## 2025-01-07 RX ADMIN — PANTOPRAZOLE SODIUM 40 MG: 40 TABLET, DELAYED RELEASE ORAL at 05:42

## 2025-01-07 RX ADMIN — INSULIN LISPRO 1 UNITS: 100 INJECTION, SOLUTION INTRAVENOUS; SUBCUTANEOUS at 16:54

## 2025-01-07 RX ADMIN — HEPARIN SODIUM 5000 UNITS: 5000 INJECTION, SOLUTION INTRAVENOUS; SUBCUTANEOUS at 20:53

## 2025-01-07 RX ADMIN — NIFEDIPINE 90 MG: 90 TABLET, FILM COATED, EXTENDED RELEASE ORAL at 20:37

## 2025-01-07 RX ADMIN — INSULIN GLARGINE 14 UNITS: 100 INJECTION, SOLUTION SUBCUTANEOUS at 13:06

## 2025-01-07 RX ADMIN — TACROLIMUS 0.5 MG: 0.5 CAPSULE ORAL at 05:42

## 2025-01-07 RX ADMIN — METHYLPREDNISOLONE SODIUM SUCCINATE 125 MG: 125 INJECTION, POWDER, FOR SOLUTION INTRAMUSCULAR; INTRAVENOUS at 13:05

## 2025-01-07 RX ADMIN — ACETAMINOPHEN 975 MG: 325 TABLET ORAL at 14:48

## 2025-01-07 RX ADMIN — PRAVASTATIN SODIUM 10 MG: 10 TABLET ORAL at 20:37

## 2025-01-07 RX ADMIN — ACETAMINOPHEN 975 MG: 325 TABLET ORAL at 20:38

## 2025-01-07 RX ADMIN — DARBEPOETIN ALFA 60 MCG: 60 INJECTION, SOLUTION INTRAVENOUS; SUBCUTANEOUS at 13:05

## 2025-01-07 RX ADMIN — CARVEDILOL 25 MG: 12.5 TABLET, FILM COATED ORAL at 20:38

## 2025-01-07 ASSESSMENT — PAIN - FUNCTIONAL ASSESSMENT: PAIN_FUNCTIONAL_ASSESSMENT: NO/DENIES PAIN

## 2025-01-07 ASSESSMENT — PAIN SCALES - GENERAL
PAINLEVEL_OUTOF10: 0 - NO PAIN

## 2025-01-07 NOTE — PROGRESS NOTES
Internal Medicine Progress Note        Subjective     Subjective   NAOE. Patient seen at bedside this morning in dialysis. He states that his pain is still present but much improved from yesterday. He states that he has not had any episodes of N/V since yesterday. Overall, feeling much better since starting dialysis this AM. Denies any other acute complaints.     Objective   Objective     Vitals: I/O:   Vitals:    01/07/25 1049   BP:    Pulse: 83   Resp:    Temp: 36.6 °C (97.9 °F)   SpO2:       24hr Min/Max:  Temp  Min: 36.5 °C (97.7 °F)  Max: 36.6 °C (97.9 °F)  Pulse  Min: 62  Max: 83  BP  Min: 130/62  Max: 152/73  Resp  Min: 16  Max: 20  SpO2  Min: 92 %  Max: 100 %   Intake/Output Summary (Last 24 hours) at 1/7/2025 1124  Last data filed at 1/7/2025 1049  Gross per 24 hour   Intake 1240 ml   Output 1100 ml   Net 140 ml         Physical Exam:   General: Awake, alert, not in any acute distress.   HEENT: no scleral icterus or conjunctivitis  Pulmonary: CTAB, normal respiratory effort, not on supplemental oxygen  Cardiac: RRR, no M/R/G, no JVD  Abdomen: Soft, non distended, TTP of LLQ over transplanted kidney. No significant swelling. No rebound or guarding   EXT: No peripheral edema, no asymmetry noted. LUE AV graft  MSK: No focal joint swelling noted  Skin: No rashes or wounds  Neuro: AOx4, moving all limbs spontaneously, follows commands  Psych: Coherent thought process, appropriate mood and affect    General Chemistry Labs    RFP  Results from last 72 hours   Lab Units 01/07/25  0454 01/06/25  0553 01/05/25  1039   GLUCOSE mg/dL 287* 173* 218*   SODIUM mmol/L 130* 133* 133*   POTASSIUM mmol/L 6.4* 5.0 4.8   CHLORIDE mmol/L 94* 97* 96*   CO2 mmol/L 23 27 28   BUN mg/dL 50* 35* 24*   CREATININE mg/dL 11.66* 10.58* 8.43*   ANION GAP mmol/L 19 14 14   EGFR mL/min/1.73m*2 4* 5* 6*   CALCIUM mg/dL 9.0 9.1 9.5   PHOSPHORUS mg/dL 4.8 4.3  --    MAGNESIUM mg/dL 1.89 1.88  --    ALBUMIN g/dL 3.2* 3.3* 3.7   ALK PHOS U/L  --  "  --  89   ALT U/L  --   --  16   AST U/L  --   --  16   BILIRUBIN TOTAL mg/dL  --   --  0.5   PROTEIN TOTAL g/dL  --   --  7.7      CBC  Results from last 72 hours   Lab Units 01/07/25  0454 01/06/25  0553 01/05/25  1039   WBC AUTO x10*3/uL 2.6* 4.5 4.0*   HEMOGLOBIN g/dL 10.3* 10.0* 10.5*   HEMATOCRIT % 30.9* 30.6* 30.7*   MCV fL 83 85 82   MCH pg 27.5 27.8 27.9   MCHC g/dL 33.3 32.7 34.2   RDW % 14.6* 14.6* 14.9*   PLATELETS AUTO x10*3/uL 112* 94* 90*   NEUTROS PCT AUTO % 83.5 71.2 65.7   LYMPHS PCT AUTO % 11.8 12.3 16.4   MONOS PCT AUTO % 3.5 10.3 12.1   EOS PCT AUTO % 0.0 4.9 4.5     Coagulation Labs        No lab exists for component: \"RETICHBG\"  Cardiac Labs      Micro/ID:   Lab Results   Component Value Date    URINECULTURE CANCELED 07/19/2023    BLOODCULT No growth at 4 days -  FINAL REPORT 09/10/2024    BLOODCULT No growth at 4 days -  FINAL REPORT 09/10/2024       Scheduled Medications:  PRN Medications:  Continuous Medications:   acetaminophen, 975 mg, oral, TID  aspirin, 81 mg, oral, Daily  calcitriol, 0.5 mcg, oral, Daily  carvedilol, 25 mg, oral, BID  cinacalcet, 30 mg, oral, Daily  darbepoetin aida, 60 mcg, subcutaneous, Weekly  heparin (porcine), 5,000 Units, subcutaneous, q8h  insulin glargine, 14 Units, subcutaneous, q24h  insulin lispro, 0-5 Units, subcutaneous, TID AC  insulin lispro, 1 Units, subcutaneous, TID AC  isosorbide mononitrate ER, 60 mg, oral, Daily  lidocaine, 1 patch, transdermal, Daily  methylPREDNISolone sodium succinate (PF), 125 mg, intravenous, Daily  metoclopramide, 5 mg, oral, BID AC  NIFEdipine ER, 90 mg, oral, BID  pantoprazole, 40 mg, oral, Daily before breakfast  piperacillin-tazobactam, 2.25 g, intravenous, q12h  pravastatin, 10 mg, oral, Nightly  [Held by provider] predniSONE, 5 mg, oral, Daily  tacrolimus, 0.5 mg, oral, q12h  vitamin B complex-vitamin C-folic acid, 1 capsule, oral, Daily     PRN medications: dextrose, dextrose, glucagon, glucagon, hydrALAZINE, " oxyCODONE       Summary of key imaging results from the last 24 hours:    CT abdomen pelvis wo IV contrast    Result Date: 1/5/2025  Interpreted By:  Hue Aguilar and Nakamoto Kent STUDY: CT ABDOMEN PELVIS WO IV CONTRAST;  1/5/2025 1:13 pm   INDICATION: Signs/Symptoms:LLQ abd pain, hx renal transplant.   COMPARISON: CT abdomen and pelvis 09/10/2024   ACCESSION NUMBER(S): [id number]   ORDERING CLINICIAN: SENIA CAGE   TECHNIQUE: Contiguous axial images of the abdomen and pelvis were obtained without intravenous contrast. Coronal and sagittal reformatted images were reconstructed from the axial data.   FINDINGS: LOWER CHEST: Similar size right pleural effusion with associated atelectasis. Decreased size/near resolution of the left pleural effusion. There is decreased ground-glass opacities within the bilateral lung bases. The heart is enlarged in size. No pericardial effusion.   Note: The absence of intravenous contrast limits evaluation of the solid organs and vasculature.   ABDOMEN/PELVIS:   ABDOMINAL WALL: There is mild abdominal wall and proximal lower extremity edema.   LIVER: The liver is borderline enlarged measuring 18.5 cm in craniocaudal dimension which is similar compared to prior exam. No focal hepatic lesions within the limitations of noncontrast exam.   BILE DUCTS: No significant intrahepatic or extrahepatic dilatation.   GALLBLADDER: Status post cholecystectomy   PANCREAS: No significant abnormality.   SPLEEN: The spleen is borderline enlarged measuring 13.5 cm in the craniocaudal dimension, previously 14.5 cm on 09/10/2024 CT..   ADRENALS: No significant abnormality.   KIDNEYS, URETERS, BLADDER: Redemonstrated bilateral atrophic kidneys with scattered calcifications. Redemonstrated postsurgical changes of left iliac fossa transplant kidney without evidence of hydro nephrosis or nephroureterolithiasis. Interval development of marked fat stranding adjacent to the left iliac fossa renal  transplant which appears edematous and heterogeneous, new compared to 09/10/2024 CT. No perinephric fluid collections. . Urinary bladder is decompressed, limiting evaluation. However there is perivesicular fat stranding concerning for urinary tract infection.   REPRODUCTIVE ORGANS: No significant abnormality.   VESSELS: Marked calcified atherosclerosis of the internal iliac arteries as well as the common femoral superficial femoral and deep profunda arteries.   RETROPERITONEUM/LYMPH NODES: No enlarged lymph nodes. No acute retroperitoneal abnormality.   BOWEL/MESENTERY/PERITONEUM: The stomach is slightly distended with enteric contents. Otherwise appears within normal limits.. There are postsurgical changes from prior small bowel resection and ileostomy. There is colonic diverticulosis without evidence of diverticulitis. The sutures appear intact. No inflammatory bowel wall thickening or dilatation. Appendix is surgically removed.   No ascites, free air, or fluid collection.     MUSCULOSKELETAL: No acute osseous abnormality. No suspicious osseous lesion. Degenerative changes of the lower thoracic and lumbar spine are noted and unchanged. Redemonstrated sclerotic focus within the S2 vertebral body that likely represents a bone island.       1. Interval development of marked fat stranding adjacent to the left iliac fossa renal transplant which appears edematous and heterogeneous, new compared to 09/10/2024 CT. No perinephric fluid collections. Findings concerning for urinary tract infection/pyelonephritis. Correlation with urinalysis is recommended. 2. Slight interval decrease in mild right pleural effusion with interval resolution of previously noted small left pleural effusion. Persistent bilateral ground-glass opacities with mild interlobular septal thickening, likely pulmonary edema. 3. Mild splenomegaly measuring up to 13.5 cm in craniocaudal dimension, which has improved compared to 14.5 cm on 09/10/2024 CT. 4.  Additional findings as described above.   I personally reviewed the images/study and I agree with the findings as stated by Kendrick Purdy MD. This study was interpreted at [facility], [place], [place].   MACRO: None.   Signed by: Hue Aguilar 1/5/2025 2:17 PM Dictation workstation:   [id number]       Assessment/Plan   Assessment and Plan    Manolo Bashir is a 69 y.o. male with a past medical history of ESRD (on dialysis; TThS schedule; s/p 2x renal transplant - 1992, 2013), now with impaired allograft function currently on immunosuppression (prednisone, tacrolimus), IgG and IgA lambda MGUS (recent hematologic eval including Bmbx with no e/o myeloma), T2DM, HTN, prostate cancer (s/p radical prostatectomy), HCV (s/p rx; PCR neg 10/2023), and gastroparesis admitted on 01/05/2025 with severe kidney pain, hematuria and CT imagining findings c/f pyelonephritis. Patient recently missed about a weeks worth of doses of his immunosuppressive regimen and experienced recent infectious symptoms over abdoulaye (fevers/chills/NV/decreased appetite). Last HD session 01/04. S/p 1 dose CFTX in the ED. Transplant nephrology consulted and aware of patient, will follow recs. Will continue empiric treatment with Zosyn.     Updates 01/07/25  - Solumedrol 125 mg/day x 3 days (1/6-1/8/25) then prednisone taper per transplant nephrology, continue to appreciate recs. Will keep TAC 0.5 mg BID for now  - Got dialyzed 1/7 AM  - Continue empiric antibiotics (Zosyn) for at least 3 days (1/5 - 1/8)  - Re-attempt collecting UA/urine culture   - Increased lantus to 14U    #Hematuria and flank pain c/f Pyelnophritis  :: CTAP 01/05 with interval development of marked fat stranding adjacent to the left  iliac fossa renal transplant which appears edematous and heterogeneous, new compared to 09/10/2024 CT. No perinephric fluid collections. Findings concerning for urinary tract infection/pyelonephritis  ::Prior Ucx data  unremarkable  ::WBC 4.0 on admission, on chronic immunosuppression, recent infectious symptoms  ::S/p 1 dose CFTX in the ED  ::Got dialyzed 1/7 AM  Plan:  - Continue empiric antibiotics (Zosyn) for at least 3 days (1/5 - 1/8)  - Re-attempt collecting UA/urine culture   -Strict I/O's  -Tylenol and oxy 5mg PRN for pain control     #ESRD   :: on iHD (TTS) s/p 2 time renal transplant (1992, 2013)  :: now with impaired allograft function currently on immunosuppression (prednisone, tacrolimus)  ::Last admit transplant nephrology recommended hold tacrolimus 1 mg BID and continue with prednisone 5 mg daily  ::Patient currently on the transplant list  ::Held home prednisone 5mg 1/7  - Transplant nephrology following, appreciate recs  - Solumedrol 125 mg/day x 3 days (1/6-1/8/25) then prednisone taper per transplant nephrology, continue to appreciate recs.   - c/w tacrolimus 0.5mg BID per tx nephrology  - AM tacro levels daily  - c/w home renal vitamins     #Hx Gastroparesis   #Nausea and vomiting   :: S/p GES on April 2024 revealing gastroparesis.   :: was seen at the GI clinic in 6/2024 recommended c/w Reglan dosing of 5mg before dinner and at bedtime and Pantoprazole 40 mg daily  ::treated with erythromycin last admit  Plan:  - c/w home PPI   - c/w metoclopramide prn     #DM2  :: Last A1c 9.1 (12/16/2024)  :: Home insulin (Glargine 18 units at bed time, Lisopro 2U TID with meals, SSI)  - Increased lantus to 14U AM (patient takes at AM at home), Lispro 1U TID w/ meals  - SSI #1  - CTM POC glucose      #HTN  ::Home meds carvedilol 25 mg BID,  Nifedipine 60 mg daily, imdur 60mg daily  - Continue Nifedipine to 90mg daily for optimizing blood pressure  -C/w home carvedilol  -c/w home imdur  -Continue hydral 25mg TID PRN for SBP>170      #HLD  - c/w home asa and pravastatin 10mg     #Anemia  #Chronic thrombocytopenia   :: Hb 10.5 on admission, at recent baseline  :: Most likely related to ESRD  ::Patient prescribe epoetin q 7  days  Plan:  - continue to monitor     Medical Check List   FEN  -Fluids: PRN   -Electrolytes: PRN, with goals of Mg >2, K>4  -Nutrition: Adult diet Renal, Consistent Carb; CCD 60 gm/meal; Potassium Restricted 2 gm (50mEq); 2 - 3 grams Sodium  Prophylaxis:  -DVT ppx: Unfractionated Heparin 5000 q8h  -GI ppx/Bowel care: PPI  -Abx: Zosyn  -Pain regimen: Tylenol TID and Oxy 5mg PRN  Hardware:  -Drains: None  -Lines: PIV  Social:  -Code: Full Code  -NOK/Surrogate Decision Maker: KETTY PLASENCIA Home Phone: [phone]     Patient assessment and plan staffed with the attending physician on service, Dr. Philippe.    Mara Griffin MD  Internal Medicine PGY-1  1/7/2025    Disclaimer: Documentation completed with the information available at the time of input. The times in the chart may not be reflective of actual patient care times, interventions, or procedures. Documentation occurs after the physical care of the patient.

## 2025-01-07 NOTE — CARE PLAN
The patient's goals for the shift include to feel better    The clinical goals for the shift include Patient will remain hemodynamically stable      Problem: Pain - Adult  Goal: Verbalizes/displays adequate comfort level or baseline comfort level  Outcome: Progressing     Problem: Safety - Adult  Goal: Free from fall injury  Outcome: Progressing     Problem: Discharge Planning  Goal: Discharge to home or other facility with appropriate resources  Outcome: Progressing     Problem: Chronic Conditions and Co-morbidities  Goal: Patient's chronic conditions and co-morbidity symptoms are monitored and maintained or improved  Outcome: Progressing

## 2025-01-07 NOTE — PROGRESS NOTES
"Manolo Bashir is a 69 y.o. male on day 2 of admission presenting with Pyelonephritis/graft intolerance syndrome    Subjective   HD today  No acute event overnight.  Allograft pain and tenderness is much better. Still has gross hematuria.       Objective     Vital signs - reviewed.   General Appearance - NAD, Good speech, oriented and alert  HEENT - Supple. Not pale. No jaundice.   CVS - RRR. Normal S1/S2. No murmur, click , rub or gallop  Lungs- clear to auscultation bilaterally  Abdomen - soft , not tender, no guarding, no rigidity. No hepatosplenomegaly. Normal bowel sounds. No masses and ascites. S/P Kidney transplant. No tenderness to palpation at LLQ allograft  Musculoskeletal /Extremities - no edema. Full ROM. No joint tenderness.   Neuro/Psych - appropriate mood and affect. Motor power V/V all extremities. CN I -XII were grossly intact.  Skin - No visible rash  Access - LUE AVF +thrill    Last Recorded Vitals  Blood pressure 130/62, pulse 64, temperature 36.5 °C (97.7 °F), temperature source Temporal, resp. rate 16, height 1.727 m (5' 7.99\"), weight 71.7 kg (158 lb), SpO2 99%.  Intake/Output last 3 Shifts:  I/O last 3 completed shifts:  In: 890 (12.4 mL/kg) [P.O.:490; I.V.:400 (5.6 mL/kg)]  Out: - (0 mL/kg)   Weight: 71.7 kg     Relevant Results  Results for orders placed or performed during the hospital encounter of 01/05/25 (from the past 96 hours)   CBC and Auto Differential   Result Value Ref Range    WBC 4.0 (L) 4.4 - 11.3 x10*3/uL    nRBC 0.0 0.0 - 0.0 /100 WBCs    RBC 3.76 (L) 4.50 - 5.90 x10*6/uL    Hemoglobin 10.5 (L) 13.5 - 17.5 g/dL    Hematocrit 30.7 (L) 41.0 - 52.0 %    MCV 82 80 - 100 fL    MCH 27.9 26.0 - 34.0 pg    MCHC 34.2 32.0 - 36.0 g/dL    RDW 14.9 (H) 11.5 - 14.5 %    Platelets 90 (L) 150 - 450 x10*3/uL    Neutrophils % 65.7 40.0 - 80.0 %    Immature Granulocytes %, Automated 0.8 0.0 - 0.9 %    Lymphocytes % 16.4 13.0 - 44.0 %    Monocytes % 12.1 2.0 - 10.0 %    Eosinophils % 4.5 0.0 - " 6.0 %    Basophils % 0.5 0.0 - 2.0 %    Neutrophils Absolute 2.61 1.20 - 7.70 x10*3/uL    Immature Granulocytes Absolute, Automated 0.03 0.00 - 0.70 x10*3/uL    Lymphocytes Absolute 0.65 (L) 1.20 - 4.80 x10*3/uL    Monocytes Absolute 0.48 0.10 - 1.00 x10*3/uL    Eosinophils Absolute 0.18 0.00 - 0.70 x10*3/uL    Basophils Absolute 0.02 0.00 - 0.10 x10*3/uL   Comprehensive metabolic panel   Result Value Ref Range    Glucose 218 (H) 74 - 99 mg/dL    Sodium 133 (L) 136 - 145 mmol/L    Potassium 4.8 3.5 - 5.3 mmol/L    Chloride 96 (L) 98 - 107 mmol/L    Bicarbonate 28 21 - 32 mmol/L    Anion Gap 14 10 - 20 mmol/L    Urea Nitrogen 24 (H) 6 - 23 mg/dL    Creatinine 8.43 (H) 0.50 - 1.30 mg/dL    eGFR 6 (L) >60 mL/min/1.73m*2    Calcium 9.5 8.6 - 10.6 mg/dL    Albumin 3.7 3.4 - 5.0 g/dL    Alkaline Phosphatase 89 33 - 136 U/L    Total Protein 7.7 6.4 - 8.2 g/dL    AST 16 9 - 39 U/L    Bilirubin, Total 0.5 0.0 - 1.2 mg/dL    ALT 16 10 - 52 U/L   Tacrolimus level   Result Value Ref Range    Tacrolimus  2.2 <=15.0 ng/mL   POCT GLUCOSE   Result Value Ref Range    POCT Glucose 222 (H) 74 - 99 mg/dL   POCT GLUCOSE   Result Value Ref Range    POCT Glucose 215 (H) 74 - 99 mg/dL   Magnesium   Result Value Ref Range    Magnesium 1.88 1.60 - 2.40 mg/dL   Renal Function Panel   Result Value Ref Range    Glucose 173 (H) 74 - 99 mg/dL    Sodium 133 (L) 136 - 145 mmol/L    Potassium 5.0 3.5 - 5.3 mmol/L    Chloride 97 (L) 98 - 107 mmol/L    Bicarbonate 27 21 - 32 mmol/L    Anion Gap 14 10 - 20 mmol/L    Urea Nitrogen 35 (H) 6 - 23 mg/dL    Creatinine 10.58 (H) 0.50 - 1.30 mg/dL    eGFR 5 (L) >60 mL/min/1.73m*2    Calcium 9.1 8.6 - 10.6 mg/dL    Phosphorus 4.3 2.5 - 4.9 mg/dL    Albumin 3.3 (L) 3.4 - 5.0 g/dL   CBC and Auto Differential   Result Value Ref Range    WBC 4.5 4.4 - 11.3 x10*3/uL    nRBC 0.0 0.0 - 0.0 /100 WBCs    RBC 3.60 (L) 4.50 - 5.90 x10*6/uL    Hemoglobin 10.0 (L) 13.5 - 17.5 g/dL    Hematocrit 30.6 (L) 41.0 - 52.0 %     MCV 85 80 - 100 fL    MCH 27.8 26.0 - 34.0 pg    MCHC 32.7 32.0 - 36.0 g/dL    RDW 14.6 (H) 11.5 - 14.5 %    Platelets 94 (L) 150 - 450 x10*3/uL    Neutrophils % 71.2 40.0 - 80.0 %    Immature Granulocytes %, Automated 0.9 0.0 - 0.9 %    Lymphocytes % 12.3 13.0 - 44.0 %    Monocytes % 10.3 2.0 - 10.0 %    Eosinophils % 4.9 0.0 - 6.0 %    Basophils % 0.4 0.0 - 2.0 %    Neutrophils Absolute 3.18 1.20 - 7.70 x10*3/uL    Immature Granulocytes Absolute, Automated 0.04 0.00 - 0.70 x10*3/uL    Lymphocytes Absolute 0.55 (L) 1.20 - 4.80 x10*3/uL    Monocytes Absolute 0.46 0.10 - 1.00 x10*3/uL    Eosinophils Absolute 0.22 0.00 - 0.70 x10*3/uL    Basophils Absolute 0.02 0.00 - 0.10 x10*3/uL   POCT GLUCOSE   Result Value Ref Range    POCT Glucose 148 (H) 74 - 99 mg/dL   Tacrolimus level   Result Value Ref Range    Tacrolimus  2.0 <=15.0 ng/mL   POCT GLUCOSE   Result Value Ref Range    POCT Glucose 108 (H) 74 - 99 mg/dL   POCT GLUCOSE   Result Value Ref Range    POCT Glucose 154 (H) 74 - 99 mg/dL   Magnesium   Result Value Ref Range    Magnesium 1.89 1.60 - 2.40 mg/dL   Renal Function Panel   Result Value Ref Range    Glucose 287 (H) 74 - 99 mg/dL    Sodium 130 (L) 136 - 145 mmol/L    Potassium 6.4 (HH) 3.5 - 5.3 mmol/L    Chloride 94 (L) 98 - 107 mmol/L    Bicarbonate 23 21 - 32 mmol/L    Anion Gap 19 10 - 20 mmol/L    Urea Nitrogen 50 (H) 6 - 23 mg/dL    Creatinine 11.66 (H) 0.50 - 1.30 mg/dL    eGFR 4 (L) >60 mL/min/1.73m*2    Calcium 9.0 8.6 - 10.6 mg/dL    Phosphorus 4.8 2.5 - 4.9 mg/dL    Albumin 3.2 (L) 3.4 - 5.0 g/dL   CBC and Auto Differential   Result Value Ref Range    WBC 2.6 (L) 4.4 - 11.3 x10*3/uL    nRBC 0.0 0.0 - 0.0 /100 WBCs    RBC 3.74 (L) 4.50 - 5.90 x10*6/uL    Hemoglobin 10.3 (L) 13.5 - 17.5 g/dL    Hematocrit 30.9 (L) 41.0 - 52.0 %    MCV 83 80 - 100 fL    MCH 27.5 26.0 - 34.0 pg    MCHC 33.3 32.0 - 36.0 g/dL    RDW 14.6 (H) 11.5 - 14.5 %    Platelets 112 (L) 150 - 450 x10*3/uL    Neutrophils % 83.5 40.0 -  80.0 %    Immature Granulocytes %, Automated 1.2 (H) 0.0 - 0.9 %    Lymphocytes % 11.8 13.0 - 44.0 %    Monocytes % 3.5 2.0 - 10.0 %    Eosinophils % 0.0 0.0 - 6.0 %    Basophils % 0.0 0.0 - 2.0 %    Neutrophils Absolute 2.13 1.20 - 7.70 x10*3/uL    Immature Granulocytes Absolute, Automated 0.03 0.00 - 0.70 x10*3/uL    Lymphocytes Absolute 0.30 (L) 1.20 - 4.80 x10*3/uL    Monocytes Absolute 0.09 (L) 0.10 - 1.00 x10*3/uL    Eosinophils Absolute 0.00 0.00 - 0.70 x10*3/uL    Basophils Absolute 0.00 0.00 - 0.10 x10*3/uL   Hepatitis B surface antigen   Result Value Ref Range    Hepatitis B Surface AG Nonreactive Nonreactive   Hepatitis B surface antibody   Result Value Ref Range    Hepatitis B Surface AB 3.4 <10.0 mIU/mL     *Note: Due to a large number of results and/or encounters for the requested time period, some results have not been displayed. A complete set of results can be found in Results Review.       Assessment/Plan   Assessment & Plan  Pyelonephritis    Chronic rejection of kidney transplant ([facility])    Manolo Bashir is a 69 y.o. male presenting with LLQ allograft tenderness and gross hematuria.  He has ESKD attributed to hypertension since 1998 s/p kidney transplant #1 3/26/1992 that failed 11/13/2006), s/p kidney transplant #2 7/13/2013 which failed in May 2024 following which he has been on HD (TTS, [facility] via LUE AVG) . He also has known MGUS with IgG and IgA lambda (bone marrow bx 10/2023 with no plasma cell dyscrasia), DM2, HTN, prostate cancer s/p radical prostatectomy, baseline urinary incontinence, HCV s/p treatment (recent HCV PCR negative 7/2024).      ESKD  HD today     Graft intolerance syndrome/chronic rejection  - Solumedrol 125 mg/day x 3 days (1/6-1/8/25) then prednisone 40 mg/day x 5 days then 20 mg/day x 5 days then 10 mg/day x 5 days then keep at prednisone 5 mg/day  - keep TAC 0.5 mg BID for now  - empiric antibiotics for at least 3 days     Hypertension  - Coreg 25 mg BID  -  Nifedipine 90 mg BID  - Volume management during dialysis  - Low salt diet     CKD-MBD  - Calcitriol 0.5 mg/day  - Sensipar 30 mg/day  - Dietary phosphorus restriction     Heme - acceptable on DARIANA     Diabetes  - basal-bolus regimen per primary. May need further adjustment while on high dose steroid  - ASA, pravastatin     Nutrition - renal vitamins, renal dialysis diet     Re-transplant  Evaluated by  on 7/10/24: reasonable candidate for 3rd kidney transplant.  No potential living donor.    Betty Ireland MD

## 2025-01-07 NOTE — PROGRESS NOTES
Pharmacy Medication History Review    Manolo Bashir is a 69 y.o. male admitted for Pyelonephritis. Pharmacy reviewed the patient's [id number] medications and allergies for accuracy.    Medications ADDED:  Carvedilol 25 mg  Medications CHANGED:  N/A  Medications REMOVED:   Losartan 25 mg     The list below reflects the updated PTA list.   Prior to Admission Medications   Prescriptions Last Dose Informant   Easy Touch Alcohol Prep Pads pads, medicated Unknown Friend   NIFEdipine ER (Adalat CC) 60 mg 24 hr tablet  Friend   Sig: Take 1 tablet (60 mg) by mouth 2 times a day. Do not crush, chew, or split.   Unilet Super Thin Lancets 30 gauge misc Unknown Friend   Si each 3 times a day.   acetaminophen (Tylenol) 325 mg tablet  Friend   Sig: Take 3 tablets (975 mg) by mouth every 6 hours if needed (pain).   aspirin 81 mg chewable tablet  Friend   Sig: Chew 1 tablet (81 mg) once daily.   calcitriol (Rocaltrol) 0.5 mcg capsule  Friend   Sig: Take 1 capsule (0.5 mcg) by mouth once daily.   carvedilol (Coreg) 25 mg tablet  Friend   Sig: Take 1 tablet (25 mg) by mouth 2 times a day.   cinacalcet (Sensipar) 30 mg tablet Not Taking Friend   Sig: Take 1 tablet (30 mg) by mouth once daily.   Patient not taking: Reported on 2025   epoetin aida 10,000 unit/mL injection Not Taking Friend   Sig: Inject 1 mL (10,000 Units) under the skin every 7 days. Do not start before 2024.   Patient not taking: Reported on 2025   glucagon (Gvoke HypoPen 1-Pack) 1 mg/0.2 mL auto-injector Not Taking Friend   Sig: Inject 1 mg into the muscle every 15 minutes if needed (For blood glucose 41 to 70 mg/dL and no IV access).   Patient not taking: Reported on 2025   hydrALAZINE (Apresoline) 50 mg tablet Not Taking Friend   Sig: Take 2 tablets (100 mg) by mouth 2 times a day.   Patient not taking: Reported on 2025   imiquimod (Aldara) 5 % cream Unknown Friend   Sig: Apply 1 packet topically 3 times a week.   insulin  "glargine (Lantus) 100 unit/mL (3 mL) pen Unknown Friend   Sig: Inject 18 Units under the skin once daily in the morning. Inject 20 units daily as directed   Patient taking differently: Inject 18 Units under the skin once daily in the morning.   insulin lispro (HumaLOG KwikPen Insulin) 100 unit/mL injection Unknown Friend   Si units with meals plus sliding scale up to 30 units daily   isosorbide mononitrate ER (Imdur) 60 mg 24 hr tablet Unknown Friend   Sig: Take 1 tablet (60 mg) by mouth once daily.   lancets (Unilet Super Thin Lancets) 30 gauge misc Unknown Friend   Sig: USE TO TEST BLOOD SUGAR THREE TIMES A DAY   lancing device misc Unknown Friend   Sig: use as directed   metoclopramide (Reglan) 5 mg tablet Not Taking Friend   Sig: Take 1 tablet (5 mg) by mouth 2 times a day. 1 tablet before dinner and 1 tablet at bedtime. (need OFFICE VISIT by end of october -early november per MD)   Patient not taking: Reported on 2025   pantoprazole (ProtoNix) 40 mg EC tablet  Friend   Sig: Take 1 tablet (40 mg) by mouth once daily in the morning. Take before meals. Do not crush, chew, or split. Do not start before 2024.   pen needle, diabetic (TechLITE Pen Needle) 32 gauge x \" needle Unknown Friend   Sig: Use 4 a day as directed   pravastatin (Pravachol) 10 mg tablet Unknown Friend   Sig: Take 1 tablet (10 mg) by mouth once daily at bedtime.   predniSONE (Deltasone) 5 mg tablet Unknown Friend   Sig: Take 1 tablet (5 mg) by mouth once daily.   syringe with needle 3 mL 25 x 5/8\" syringe Unknown Friend   Sig: Use to inject epogen.   tacrolimus (Prograf) 0.5 mg capsule  Friend   Sig: Take 1 capsule (0.5 mg) by mouth 2 times a day.   tacrolimus (Protopic) 0.1 % ointment Unknown Friend   Sig: Apply topically 2 times a day.   Patient not taking: Reported on 2024   vitamin B complex-vitamin C-folic acid (Nephrocaps) 1 mg capsule  Friend   Sig: Take 1 capsule by mouth once daily.      Facility-Administered " Medications: None       The list below reflects the updated allergy list. Please review each documented allergy for additional clarification and justification.  Allergies  Reviewed by Adiel Agee RN on 1/5/2025   No Known Allergies         Patient accepts M2B at discharge. Pharmacy has been updated to [facility].    Sources  [facility]  Pharmacy dispense history  Patient interview Unable to provide any details  Friend, Good historian  Able to review prescription bottles over the phone  Chart Review     Additional Comments  Cinacalcet not mentioned during review of prescription bottles  Per records from [facility] may be insurance issue or needing to obtain through dialysis  Last viewable dispense on 11/7/24 for 30 day supply    Davian Chowdary, PharmD  Transitions of Care Pharmacist  [facility]  Please reach out via Secure Chat for questions, or if no response call [place] or [facility]

## 2025-01-07 NOTE — NURSING NOTE
Report to Receiving RN:    Report To: AYO Avila  Time Report Called: 1045 am   Hand-Off Communication: post vs 166/79; HR 83, fluid removed 1.1 liters  Complications During Treatment: No  Ultrafiltration Treatment: No  Medications Administered During Dialysis: No  Blood Products Administered During Dialysis: No  Labs Sent During Dialysis: No  Heparin Drip Rate Changes: No  Dialysis Catheter Dressing:  Left AVF  Last Dressing Change: 01/07/2025    Electronic Signatures:   (Signed )   Authored:    (Signed )   Authored:     Last Updated: 10:37 AM by STEPAN BLAKE am

## 2025-01-07 NOTE — NURSING NOTE
Report from Sending RN:    Report From: Thomas  Recent Surgery of Procedure: No  Baseline Level of Consciousness (LOC): A and O x 4  Oxygen Use: No  Type: ra  Diabetic: Yes  Last BP Med Given Day of Dialysis: None  Last Pain Med Given: None  Lab Tests to be Obtained with Dialysis: No  Blood Transfusion to be Given During Dialysis: No  Available IV Access: Yes  Medications to be Administered During Dialysis: No  Continuous IV Infusion Running: No  Restraints on Currently or in the Last 24 Hours: No  Hand-Off Communication: Pt can stand for wt, no isolation  Dialysis Catheter Dressing: AVF  Last Dressing Change: NA

## 2025-01-07 NOTE — CARE PLAN
Problem: Pain - Adult  Goal: Verbalizes/displays adequate comfort level or baseline comfort level  Outcome: Progressing     Problem: Safety - Adult  Goal: Free from fall injury  Outcome: Progressing     Problem: Discharge Planning  Goal: Discharge to home or other facility with appropriate resources  Outcome: Progressing     Problem: Chronic Conditions and Co-morbidities  Goal: Patient's chronic conditions and co-morbidity symptoms are monitored and maintained or improved  Outcome: Progressing   The patient's goals for the shift include to feel better    The clinical goals for the shift include Patient will have decreased pain

## 2025-01-08 ENCOUNTER — APPOINTMENT (OUTPATIENT)
Dept: DERMATOLOGY | Facility: CLINIC | Age: 70
End: 2025-01-08
Payer: COMMERCIAL

## 2025-01-08 ENCOUNTER — PHARMACY VISIT (OUTPATIENT)
Dept: PHARMACY | Facility: CLINIC | Age: 70
End: 2025-01-08
Payer: COMMERCIAL

## 2025-01-08 VITALS
SYSTOLIC BLOOD PRESSURE: 152 MMHG | BODY MASS INDEX: 23.95 KG/M2 | TEMPERATURE: 97.3 F | WEIGHT: 158 LBS | OXYGEN SATURATION: 100 % | HEIGHT: 68 IN | DIASTOLIC BLOOD PRESSURE: 74 MMHG | RESPIRATION RATE: 15 BRPM | HEART RATE: 64 BPM

## 2025-01-08 LAB
ALBUMIN SERPL BCP-MCNC: 3.1 G/DL (ref 3.4–5)
ANION GAP SERPL CALC-SCNC: 15 MMOL/L (ref 10–20)
BASOPHILS # BLD AUTO: 0.01 X10*3/UL (ref 0–0.1)
BASOPHILS NFR BLD AUTO: 0.3 %
BUN SERPL-MCNC: 36 MG/DL (ref 6–23)
CALCIUM SERPL-MCNC: 8.4 MG/DL (ref 8.6–10.6)
CHLORIDE SERPL-SCNC: 94 MMOL/L (ref 98–107)
CO2 SERPL-SCNC: 27 MMOL/L (ref 21–32)
CREAT SERPL-MCNC: 8.31 MG/DL (ref 0.5–1.3)
EGFRCR SERPLBLD CKD-EPI 2021: 6 ML/MIN/1.73M*2
EOSINOPHIL # BLD AUTO: 0 X10*3/UL (ref 0–0.7)
EOSINOPHIL NFR BLD AUTO: 0 %
ERYTHROCYTE [DISTWIDTH] IN BLOOD BY AUTOMATED COUNT: 14.7 % (ref 11.5–14.5)
GLUCOSE BLD MANUAL STRIP-MCNC: 174 MG/DL (ref 74–99)
GLUCOSE BLD MANUAL STRIP-MCNC: 254 MG/DL (ref 74–99)
GLUCOSE BLD MANUAL STRIP-MCNC: 256 MG/DL (ref 74–99)
GLUCOSE SERPL-MCNC: 283 MG/DL (ref 74–99)
HCT VFR BLD AUTO: 27.7 % (ref 41–52)
HGB BLD-MCNC: 9 G/DL (ref 13.5–17.5)
IMM GRANULOCYTES # BLD AUTO: 0.02 X10*3/UL (ref 0–0.7)
IMM GRANULOCYTES NFR BLD AUTO: 0.6 % (ref 0–0.9)
LYMPHOCYTES # BLD AUTO: 0.49 X10*3/UL (ref 1.2–4.8)
LYMPHOCYTES NFR BLD AUTO: 14.9 %
MAGNESIUM SERPL-MCNC: 1.95 MG/DL (ref 1.6–2.4)
MCH RBC QN AUTO: 27.4 PG (ref 26–34)
MCHC RBC AUTO-ENTMCNC: 32.5 G/DL (ref 32–36)
MCV RBC AUTO: 85 FL (ref 80–100)
MONOCYTES # BLD AUTO: 0.4 X10*3/UL (ref 0.1–1)
MONOCYTES NFR BLD AUTO: 12.2 %
NEUTROPHILS # BLD AUTO: 2.37 X10*3/UL (ref 1.2–7.7)
NEUTROPHILS NFR BLD AUTO: 72 %
NRBC BLD-RTO: 0 /100 WBCS (ref 0–0)
PHOSPHATE SERPL-MCNC: 5.1 MG/DL (ref 2.5–4.9)
PLATELET # BLD AUTO: 136 X10*3/UL (ref 150–450)
POTASSIUM SERPL-SCNC: 4.9 MMOL/L (ref 3.5–5.3)
RBC # BLD AUTO: 3.28 X10*6/UL (ref 4.5–5.9)
SODIUM SERPL-SCNC: 131 MMOL/L (ref 136–145)
TACROLIMUS BLD-MCNC: <2 NG/ML
WBC # BLD AUTO: 3.3 X10*3/UL (ref 4.4–11.3)

## 2025-01-08 PROCEDURE — 2500000004 HC RX 250 GENERAL PHARMACY W/ HCPCS (ALT 636 FOR OP/ED)

## 2025-01-08 PROCEDURE — 80197 ASSAY OF TACROLIMUS: CPT

## 2025-01-08 PROCEDURE — 85025 COMPLETE CBC W/AUTO DIFF WBC: CPT

## 2025-01-08 PROCEDURE — 2500000002 HC RX 250 W HCPCS SELF ADMINISTERED DRUGS (ALT 637 FOR MEDICARE OP, ALT 636 FOR OP/ED)

## 2025-01-08 PROCEDURE — 99233 SBSQ HOSP IP/OBS HIGH 50: CPT | Performed by: STUDENT IN AN ORGANIZED HEALTH CARE EDUCATION/TRAINING PROGRAM

## 2025-01-08 PROCEDURE — 36415 COLL VENOUS BLD VENIPUNCTURE: CPT

## 2025-01-08 PROCEDURE — 83735 ASSAY OF MAGNESIUM: CPT

## 2025-01-08 PROCEDURE — 2500000001 HC RX 250 WO HCPCS SELF ADMINISTERED DRUGS (ALT 637 FOR MEDICARE OP)

## 2025-01-08 PROCEDURE — RXMED WILLOW AMBULATORY MEDICATION CHARGE

## 2025-01-08 PROCEDURE — 80069 RENAL FUNCTION PANEL: CPT

## 2025-01-08 PROCEDURE — 82947 ASSAY GLUCOSE BLOOD QUANT: CPT

## 2025-01-08 PROCEDURE — 99239 HOSP IP/OBS DSCHRG MGMT >30: CPT

## 2025-01-08 RX ORDER — INSULIN GLARGINE 100 [IU]/ML
20 INJECTION, SOLUTION SUBCUTANEOUS EVERY MORNING
Qty: 15 ML | Refills: 0 | Status: SHIPPED | OUTPATIENT
Start: 2025-01-08 | End: 2025-02-07

## 2025-01-08 RX ORDER — SULFAMETHOXAZOLE AND TRIMETHOPRIM 800; 160 MG/1; MG/1
1 TABLET ORAL DAILY
Qty: 30 TABLET | Refills: 0 | Status: SHIPPED | OUTPATIENT
Start: 2025-01-08 | End: 2025-02-07

## 2025-01-08 RX ORDER — PREDNISONE 20 MG/1
40 TABLET ORAL DAILY
Status: DISCONTINUED | OUTPATIENT
Start: 2025-01-09 | End: 2025-01-08 | Stop reason: HOSPADM

## 2025-01-08 RX ORDER — INSULIN LISPRO 100 [IU]/ML
3 INJECTION, SOLUTION INTRAVENOUS; SUBCUTANEOUS
Status: DISCONTINUED | OUTPATIENT
Start: 2025-01-08 | End: 2025-01-08 | Stop reason: HOSPADM

## 2025-01-08 RX ORDER — INSULIN LISPRO 100 [IU]/ML
3 INJECTION, SOLUTION INTRAVENOUS; SUBCUTANEOUS
Qty: 15 ML | Refills: 0 | Status: SHIPPED | OUTPATIENT
Start: 2025-01-08 | End: 2025-02-07

## 2025-01-08 RX ORDER — PREDNISONE 5 MG/1
TABLET ORAL
Qty: 130 TABLET | Refills: 0 | Status: SHIPPED | OUTPATIENT
Start: 2025-01-09 | End: 2025-03-25

## 2025-01-08 RX ORDER — INSULIN LISPRO 100 [IU]/ML
2 INJECTION, SOLUTION INTRAVENOUS; SUBCUTANEOUS
Status: DISCONTINUED | OUTPATIENT
Start: 2025-01-08 | End: 2025-01-08

## 2025-01-08 RX ORDER — NIFEDIPINE 90 MG/1
90 TABLET, EXTENDED RELEASE ORAL 2 TIMES DAILY
Qty: 60 TABLET | Refills: 0 | Status: SHIPPED | OUTPATIENT
Start: 2025-01-08 | End: 2025-02-07

## 2025-01-08 RX ORDER — CINACALCET 30 MG/1
30 TABLET, FILM COATED ORAL DAILY
Qty: 30 TABLET | Refills: 1 | Status: SHIPPED | OUTPATIENT
Start: 2025-01-08 | End: 2025-03-09

## 2025-01-08 RX ORDER — PREDNISONE 20 MG/1
20 TABLET ORAL DAILY
Status: DISCONTINUED | OUTPATIENT
Start: 2025-01-14 | End: 2025-01-08 | Stop reason: HOSPADM

## 2025-01-08 RX ORDER — INSULIN GLARGINE 100 [IU]/ML
6 INJECTION, SOLUTION SUBCUTANEOUS ONCE
Status: COMPLETED | OUTPATIENT
Start: 2025-01-08 | End: 2025-01-08

## 2025-01-08 RX ORDER — PREDNISONE 5 MG/1
10 TABLET ORAL DAILY
Status: DISCONTINUED | OUTPATIENT
Start: 2025-01-19 | End: 2025-01-08 | Stop reason: HOSPADM

## 2025-01-08 RX ORDER — PREDNISONE 5 MG/1
5 TABLET ORAL DAILY
Status: DISCONTINUED | OUTPATIENT
Start: 2025-01-24 | End: 2025-01-08 | Stop reason: HOSPADM

## 2025-01-08 RX ADMIN — ACETAMINOPHEN 975 MG: 325 TABLET ORAL at 08:36

## 2025-01-08 RX ADMIN — TACROLIMUS 0.5 MG: 0.5 CAPSULE ORAL at 06:15

## 2025-01-08 RX ADMIN — CALCITRIOL 0.5 MCG: 0.5 CAPSULE ORAL at 08:36

## 2025-01-08 RX ADMIN — METHYLPREDNISOLONE SODIUM SUCCINATE 125 MG: 125 INJECTION, POWDER, FOR SOLUTION INTRAMUSCULAR; INTRAVENOUS at 08:35

## 2025-01-08 RX ADMIN — ASCORBIC ACID, THIAMINE MONONITRATE,RIBOFLAVIN, NIACINAMIDE, PYRIDOXINE HYDROCHLORIDE, FOLIC ACID, CYANOCOBALAMIN, BIOTIN, CALCIUM PANTOTHENATE, 1 CAPSULE: 100; 1.5; 1.7; 20; 10; 1; 6000; 150000; 5 CAPSULE, LIQUID FILLED ORAL at 08:35

## 2025-01-08 RX ADMIN — METOCLOPRAMIDE 5 MG: 10 TABLET ORAL at 06:15

## 2025-01-08 RX ADMIN — PIPERACILLIN SODIUM AND TAZOBACTAM SODIUM 2.25 G: 2; .25 INJECTION, SOLUTION INTRAVENOUS at 08:35

## 2025-01-08 RX ADMIN — PANTOPRAZOLE SODIUM 40 MG: 40 TABLET, DELAYED RELEASE ORAL at 06:15

## 2025-01-08 RX ADMIN — ASPIRIN 81 MG 81 MG: 81 TABLET ORAL at 08:36

## 2025-01-08 RX ADMIN — INSULIN GLARGINE 14 UNITS: 100 INJECTION, SOLUTION SUBCUTANEOUS at 08:38

## 2025-01-08 RX ADMIN — HEPARIN SODIUM 5000 UNITS: 5000 INJECTION, SOLUTION INTRAVENOUS; SUBCUTANEOUS at 06:15

## 2025-01-08 RX ADMIN — INSULIN GLARGINE 6 UNITS: 100 INJECTION, SOLUTION SUBCUTANEOUS at 09:14

## 2025-01-08 RX ADMIN — ISOSORBIDE MONONITRATE 60 MG: 30 TABLET, EXTENDED RELEASE ORAL at 08:35

## 2025-01-08 RX ADMIN — CINACALCET HYDROCHLORIDE 30 MG: 30 TABLET, FILM COATED ORAL at 08:35

## 2025-01-08 RX ADMIN — PIPERACILLIN SODIUM AND TAZOBACTAM SODIUM 2.25 G: 2; .25 INJECTION, SOLUTION INTRAVENOUS at 16:03

## 2025-01-08 RX ADMIN — NIFEDIPINE 90 MG: 90 TABLET, FILM COATED, EXTENDED RELEASE ORAL at 08:35

## 2025-01-08 RX ADMIN — CARVEDILOL 25 MG: 12.5 TABLET, FILM COATED ORAL at 08:35

## 2025-01-08 RX ADMIN — INSULIN LISPRO 1 UNITS: 100 INJECTION, SOLUTION INTRAVENOUS; SUBCUTANEOUS at 12:04

## 2025-01-08 RX ADMIN — INSULIN LISPRO 3 UNITS: 100 INJECTION, SOLUTION INTRAVENOUS; SUBCUTANEOUS at 08:44

## 2025-01-08 RX ADMIN — INSULIN LISPRO 3 UNITS: 100 INJECTION, SOLUTION INTRAVENOUS; SUBCUTANEOUS at 08:38

## 2025-01-08 RX ADMIN — INSULIN LISPRO 3 UNITS: 100 INJECTION, SOLUTION INTRAVENOUS; SUBCUTANEOUS at 12:06

## 2025-01-08 ASSESSMENT — COGNITIVE AND FUNCTIONAL STATUS - GENERAL
MOBILITY SCORE: 24
DAILY ACTIVITIY SCORE: 24

## 2025-01-08 ASSESSMENT — PAIN - FUNCTIONAL ASSESSMENT: PAIN_FUNCTIONAL_ASSESSMENT: 0-10

## 2025-01-08 ASSESSMENT — PAIN SCALES - WONG BAKER: WONGBAKER_NUMERICALRESPONSE: NO HURT

## 2025-01-08 ASSESSMENT — PAIN SCALES - GENERAL: PAINLEVEL_OUTOF10: 0 - NO PAIN

## 2025-01-08 NOTE — CARE PLAN
Problem: Pain - Adult  Goal: Verbalizes/displays adequate comfort level or baseline comfort level  Outcome: Progressing     Problem: Safety - Adult  Goal: Free from fall injury  Outcome: Progressing     Problem: Discharge Planning  Goal: Discharge to home or other facility with appropriate resources  Outcome: Progressing     Problem: Chronic Conditions and Co-morbidities  Goal: Patient's chronic conditions and co-morbidity symptoms are monitored and maintained or improved  Outcome: Progressing   The patient's goals for the shift include to feel better    The clinical goals for the shift include Pt will remain HDS throughout shift

## 2025-01-08 NOTE — HOSPITAL COURSE
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  Patient endorsed having pain around his transplanted kidney over the past few days associated with intermittent hematuria, does endorse having chronic incontinence from before.  He is currently on hemodialysis but makes very little urine.  Denied any fever, chills.  Does endorse having episode of nausea and emesis prior to admission.  States that he ran out of one of his antihypertensives and missed his immunosuppressants last week but has since been able to get it refilled and is taking all his medications as prescribed.     While in the ED he was noted to be hypertensive, afebrile, labs with no leukocytosis, hemoglobin stable, creatinine elevated given his ESRD.  CT abdomen pelvis showed fat stranding adjacent to the left iliac fossa renal transplant concerning for pyelonephritis, no evidence of any perinephric fluid collection was noted. Straight cath was attempted with no urine output. Transplant nephrology was consulted and following while in the hospital with recommendation of starting Solumedrol 125 mg/day x 3 days (1/6-1/8/25) then prednisone 40 mg/day x 5 days then 20 mg/day x 5 days then 10 mg/day x 5 days then keep at prednisone 5 mg/day. Patient continued on TAC 0.5 mg. Additionally, patient was treated with empiric antibiotics (Zosyn) for 3 days given the concern for possible pyelonephritis. Patient got dialysis 1/7 AM and tolerated well with plan to follow up with general nephrology outpatient. Given the high dose steroids, patient's insulin regimen was increased to lantus to 20U and mealtime insulin to 3U. Patient to follow up with endocrinology outpatient given inadequate blood sugar control  (HgbA1c 9.0). Patient denied a CGM. Additionally, patient initially presented with elevated blood pressures. His BP regimen was changed to: nifedipine 60mg BID, carvedilol 25mg BID, and imdur 60mg daily.    Patient discharged on 1/8/2025 to home in stable condition.     Follow ups:  - Please follow up with general Nephrology outpatient   - Please follow up with Endocrine outpatient for diabetes management   - Follow up with PCP in 1-2 weeks for recent hospital admission

## 2025-01-08 NOTE — DISCHARGE SUMMARY
"Discharge Diagnosis  Chronic rejection of kidney transplant ([facility])  Graft intolerance syndrome.  Hypertension  ESRD on hemodialysis  Diabetes type 2 insulin-dependent.      Issues Requiring Follow-Up  - follow up with general Nephrology outpatient   - follow up with Endocrine outpatient for diabetes management   - Follow up with PCP in 1-2 weeks for recent hospital admission    Discharge Meds     Medication List      CONTINUE taking these medications     BD Luer-Rita Syringe 3 mL 25 x 5/8\" syringe; Generic drug: syringe with   needle; Use to inject epogen.   Easy Touch Alcohol Prep Pads pads, medicated; Generic drug: alcohol   swabs   TRUEdraw Lancing Device misc; Generic drug: lancing device; use as   directed   TRUEplus Pen Needle 32 gauge x 5/32\" needle; Generic drug: pen needle,   diabetic; Use 4 a day as directed   * Unilet Super Thin Lancets 30 gauge misc; Generic drug: lancets; USE TO   TEST BLOOD SUGAR THREE TIMES A DAY   * Unilet Super Thin Lancets 30 gauge misc; Generic drug: lancets; 1 each   3 times a day.  * This list has 2 medication(s) that are the same as other medications   prescribed for you. Read the directions carefully, and ask your doctor or   other care provider to review them with you.     ASK your doctor about these medications     acetaminophen 325 mg tablet; Commonly known as: Tylenol; Take 3 tablets   (975 mg) by mouth every 6 hours if needed (pain).   aspirin 81 mg chewable tablet; Chew 1 tablet (81 mg) once daily.   calcitriol 0.5 mcg capsule; Commonly known as: Rocaltrol; Take 1 capsule   (0.5 mcg) by mouth once daily.   carvedilol 25 mg tablet; Commonly known as: Coreg   cinacalcet 30 mg tablet; Commonly known as: Sensipar; Take 1 tablet (30   mg) by mouth once daily.   Epogen 10,000 unit/mL injection; Generic drug: epoetin aida; Inject 1 mL   (10,000 Units) under the skin every 7 days. Do not start before April 17, 2024.   Gvoke HypoPen 1-Pack 1 mg/0.2 mL auto-injector; Generic " drug: glucagon;   Inject 1 mg into the muscle every 15 minutes if needed (For blood glucose   41 to 70 mg/dL and no IV access).   hydrALAZINE 50 mg tablet; Commonly known as: Apresoline; Take 2 tablets   (100 mg) by mouth 2 times a day.   imiquimod 5 % cream; Commonly known as: Aldara; Apply 1 packet topically   3 times a week.   insulin glargine 100 unit/mL (3 mL) pen; Commonly known as: Lantus;   Inject 18 Units under the skin once daily in the morning. Inject 20 units   daily as directed   insulin lispro 100 unit/mL injection; Commonly known as: HumaLOG KwikPen   Insulin; 2 units with meals plus sliding scale up to 30 units daily   isosorbide mononitrate ER 60 mg 24 hr tablet; Commonly known as: Imdur;   Take 1 tablet (60 mg) by mouth once daily.   metoclopramide 5 mg tablet; Commonly known as: Reglan; Take 1 tablet (5   mg) by mouth 2 times a day. 1 tablet before dinner and 1 tablet at   bedtime. (need OFFICE VISIT by end of october -early november per MD)   NIFEdipine ER 60 mg 24 hr tablet; Commonly known as: Adalat CC; Take 1   tablet (60 mg) by mouth 2 times a day. Do not crush, chew, or split.   pantoprazole 40 mg EC tablet; Commonly known as: ProtoNix; Take 1 tablet   (40 mg) by mouth once daily in the morning. Take before meals. Do not   crush, chew, or split. Do not start before January 22, 2024.   pravastatin 10 mg tablet; Commonly known as: Pravachol; Take 1 tablet   (10 mg) by mouth once daily at bedtime.   predniSONE 5 mg tablet; Commonly known as: Deltasone; Take 1 tablet (5   mg) by mouth once daily.   Renal Caps 1 mg capsule; Generic drug: vitamin B complex-vitamin C-folic   acid; Take 1 capsule by mouth once daily.   * tacrolimus 0.1 % ointment; Commonly known as: Protopic; Apply   topically 2 times a day.   * tacrolimus 0.5 mg capsule; Commonly known as: Prograf; Take 1 capsule   (0.5 mg) by mouth 2 times a day.  * This list has 2 medication(s) that are the same as other medications   prescribed  for you. Read the directions carefully, and ask your doctor or   other care provider to review them with you.       Test Results Pending At Discharge  Pending Labs       Order Current Status    Tacrolimus level In process          Hospital Course  Manolo Bashir is a 69-year-old male with history of failed renal transplants x 2, currently on dialysis, MGUS, diabetes type 2 on insulin, hypertension, history of hep C s/p treatment with remission, prostate cancer s/p prostatectomy presented to the ED with left lower quadrant abdominal pain and hematuria.  Patient endorsed having pain around his transplanted kidney over the past few days associated with intermittent hematuria, does endorse having chronic incontinence from before.  He is currently on hemodialysis but makes very little urine.  Denied any fever, chills.  Does endorse having episode of nausea and emesis prior to admission.  States that he ran out of one of his antihypertensives and missed his immunosuppressants last week but has since been able to get it refilled and is taking all his medications as prescribed.     While in the ED he was noted to be hypertensive, afebrile, labs with no leukocytosis, hemoglobin stable, creatinine elevated given his ESRD.  CT abdomen pelvis showed fat stranding adjacent to the left iliac fossa renal transplant concerning for pyelonephritis, no evidence of any perinephric fluid collection was noted. Straight cath was attempted with no urine output. Transplant nephrology was consulted and following while in the hospital with recommendation of starting Solumedrol 125 mg/day x 3 days (1/6-1/8/25) then prednisone 40 mg/day x 5 days then 20 mg/day x 5 days then 10 mg/day x 5 days then keep at prednisone 5 mg/day. Patient continued on TAC 0.5 mg. Additionally, patient was treated with empiric antibiotics (Zosyn) for 3 days given the concern for possible pyelonephritis. Patient got dialysis 1/7 AM and tolerated well with plan to  follow up with general nephrology outpatient. Given the high dose steroids, patient's insulin regimen was increased to lantus to 20U and mealtime insulin to 3U. Patient to follow up with endocrinology outpatient given inadequate blood sugar control (HgbA1c 9.0). Patient denied a CGM. Additionally, patient initially presented with elevated blood pressures. His BP regimen was changed to: nifedipine 60mg BID, carvedilol 25mg BID, and imdur 60mg daily.    Patient discharged on 1/8/2025 to home in stable condition.     Follow ups:  - Please follow up with general Nephrology outpatient   - Please follow up with Endocrine outpatient for diabetes management   - Follow up with PCP in 1-2 weeks for recent hospital admission    Pertinent Physical Exam At Time of Discharge  General: Awake, alert, not in any acute distress.   HEENT: no scleral icterus or conjunctivitis  Pulmonary: CTAB, normal respiratory effort, not on supplemental oxygen  Cardiac: RRR, no M/R/G, no JVD  Abdomen: Soft, non distended, TTP of LLQ over transplanted kidney. No significant swelling. No rebound or guarding   EXT: No peripheral edema, no asymmetry noted. LUE AV graft  MSK: No focal joint swelling noted  Skin: No rashes or wounds  Neuro: AOx4, moving all limbs spontaneously, follows commands  Psych: Coherent thought process, appropriate mood and affect    Outpatient Follow-Up  Future Appointments   Date Time Provider Department Center   4/7/2025 11:00 AM Daxa Cote DPM [id number] [facility]   4/9/2025  3:00 PM Elma Hutton APRN-CNP, DNP [id number] [facility]   4/21/2025  2:30 PM Elías Penn APRN-CNP [id number] [facility]   5/14/2025  9:40 AM Estella Quinonez MD [id number] [facility]   6/18/2025 10:00 AM Vinicius Araiza MD [id number] [facility]     Mara Griffin MD  Internal Medicine, PGY-1

## 2025-01-08 NOTE — PROGRESS NOTES
"Manolo Bashir is a 69 y.o. 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  No acute event .  Allograft pain and tenderness is much better.        Objective     Vital signs - reviewed.   General Appearance - NAD, Good speech, oriented and alert  HEENT - Supple. Not pale. No jaundice.   CVS - RRR. Normal S1/S2. No murmur, click , rub or gallop  Lungs- clear to auscultation bilaterally  Abdomen - soft , not tender, no guarding, no rigidity. No hepatosplenomegaly. Normal bowel sounds. No masses and ascites. S/P Kidney transplant. No tenderness to palpation at LLQ allograft  Musculoskeletal /Extremities - no edema. Full ROM. No joint tenderness.   Neuro/Psych - appropriate mood and affect. Motor power V/V all extremities. CN I -XII were grossly intact.  Skin - No visible rash  Access - LUE AVF +thrill    Last Recorded Vitals  Blood pressure 128/58, pulse 64, temperature 36.6 °C (97.9 °F), resp. rate 16, height 1.727 m (5' 7.99\"), weight 71.7 kg (158 lb), SpO2 98%.  Intake/Output last 3 Shifts:  I/O last 3 completed shifts:  In: 1250 (17.4 mL/kg) [P.O.:200; I.V.:600 (8.4 mL/kg); Other:400; IV Piggyback:50]  Out: 1100 (15.3 mL/kg) [Other:1100]  Weight: 71.7 kg     Relevant Results  Results for orders placed or performed during the hospital encounter of 01/05/25 (from the past 96 hours)   CBC and Auto Differential   Result Value Ref Range    WBC 4.0 (L) 4.4 - 11.3 x10*3/uL    nRBC 0.0 0.0 - 0.0 /100 WBCs    RBC 3.76 (L) 4.50 - 5.90 x10*6/uL    Hemoglobin 10.5 (L) 13.5 - 17.5 g/dL    Hematocrit 30.7 (L) 41.0 - 52.0 %    MCV 82 80 - 100 fL    MCH 27.9 26.0 - 34.0 pg    MCHC 34.2 32.0 - 36.0 g/dL    RDW 14.9 (H) 11.5 - 14.5 %    Platelets 90 (L) 150 - 450 x10*3/uL    Neutrophils % 65.7 40.0 - 80.0 %    Immature Granulocytes %, Automated 0.8 0.0 - 0.9 %    Lymphocytes % 16.4 13.0 - 44.0 %    Monocytes % 12.1 2.0 - 10.0 %    " Eosinophils % 4.5 0.0 - 6.0 %    Basophils % 0.5 0.0 - 2.0 %    Neutrophils Absolute 2.61 1.20 - 7.70 x10*3/uL    Immature Granulocytes Absolute, Automated 0.03 0.00 - 0.70 x10*3/uL    Lymphocytes Absolute 0.65 (L) 1.20 - 4.80 x10*3/uL    Monocytes Absolute 0.48 0.10 - 1.00 x10*3/uL    Eosinophils Absolute 0.18 0.00 - 0.70 x10*3/uL    Basophils Absolute 0.02 0.00 - 0.10 x10*3/uL   Comprehensive metabolic panel   Result Value Ref Range    Glucose 218 (H) 74 - 99 mg/dL    Sodium 133 (L) 136 - 145 mmol/L    Potassium 4.8 3.5 - 5.3 mmol/L    Chloride 96 (L) 98 - 107 mmol/L    Bicarbonate 28 21 - 32 mmol/L    Anion Gap 14 10 - 20 mmol/L    Urea Nitrogen 24 (H) 6 - 23 mg/dL    Creatinine 8.43 (H) 0.50 - 1.30 mg/dL    eGFR 6 (L) >60 mL/min/1.73m*2    Calcium 9.5 8.6 - 10.6 mg/dL    Albumin 3.7 3.4 - 5.0 g/dL    Alkaline Phosphatase 89 33 - 136 U/L    Total Protein 7.7 6.4 - 8.2 g/dL    AST 16 9 - 39 U/L    Bilirubin, Total 0.5 0.0 - 1.2 mg/dL    ALT 16 10 - 52 U/L   Tacrolimus level   Result Value Ref Range    Tacrolimus  2.2 <=15.0 ng/mL   POCT GLUCOSE   Result Value Ref Range    POCT Glucose 222 (H) 74 - 99 mg/dL   POCT GLUCOSE   Result Value Ref Range    POCT Glucose 215 (H) 74 - 99 mg/dL   Magnesium   Result Value Ref Range    Magnesium 1.88 1.60 - 2.40 mg/dL   Renal Function Panel   Result Value Ref Range    Glucose 173 (H) 74 - 99 mg/dL    Sodium 133 (L) 136 - 145 mmol/L    Potassium 5.0 3.5 - 5.3 mmol/L    Chloride 97 (L) 98 - 107 mmol/L    Bicarbonate 27 21 - 32 mmol/L    Anion Gap 14 10 - 20 mmol/L    Urea Nitrogen 35 (H) 6 - 23 mg/dL    Creatinine 10.58 (H) 0.50 - 1.30 mg/dL    eGFR 5 (L) >60 mL/min/1.73m*2    Calcium 9.1 8.6 - 10.6 mg/dL    Phosphorus 4.3 2.5 - 4.9 mg/dL    Albumin 3.3 (L) 3.4 - 5.0 g/dL   CBC and Auto Differential   Result Value Ref Range    WBC 4.5 4.4 - 11.3 x10*3/uL    nRBC 0.0 0.0 - 0.0 /100 WBCs    RBC 3.60 (L) 4.50 - 5.90 x10*6/uL    Hemoglobin 10.0 (L) 13.5 - 17.5 g/dL    Hematocrit 30.6  (L) 41.0 - 52.0 %    MCV 85 80 - 100 fL    MCH 27.8 26.0 - 34.0 pg    MCHC 32.7 32.0 - 36.0 g/dL    RDW 14.6 (H) 11.5 - 14.5 %    Platelets 94 (L) 150 - 450 x10*3/uL    Neutrophils % 71.2 40.0 - 80.0 %    Immature Granulocytes %, Automated 0.9 0.0 - 0.9 %    Lymphocytes % 12.3 13.0 - 44.0 %    Monocytes % 10.3 2.0 - 10.0 %    Eosinophils % 4.9 0.0 - 6.0 %    Basophils % 0.4 0.0 - 2.0 %    Neutrophils Absolute 3.18 1.20 - 7.70 x10*3/uL    Immature Granulocytes Absolute, Automated 0.04 0.00 - 0.70 x10*3/uL    Lymphocytes Absolute 0.55 (L) 1.20 - 4.80 x10*3/uL    Monocytes Absolute 0.46 0.10 - 1.00 x10*3/uL    Eosinophils Absolute 0.22 0.00 - 0.70 x10*3/uL    Basophils Absolute 0.02 0.00 - 0.10 x10*3/uL   POCT GLUCOSE   Result Value Ref Range    POCT Glucose 148 (H) 74 - 99 mg/dL   Tacrolimus level   Result Value Ref Range    Tacrolimus  2.0 <=15.0 ng/mL   POCT GLUCOSE   Result Value Ref Range    POCT Glucose 108 (H) 74 - 99 mg/dL   POCT GLUCOSE   Result Value Ref Range    POCT Glucose 154 (H) 74 - 99 mg/dL   Magnesium   Result Value Ref Range    Magnesium 1.89 1.60 - 2.40 mg/dL   Renal Function Panel   Result Value Ref Range    Glucose 287 (H) 74 - 99 mg/dL    Sodium 130 (L) 136 - 145 mmol/L    Potassium 6.4 (HH) 3.5 - 5.3 mmol/L    Chloride 94 (L) 98 - 107 mmol/L    Bicarbonate 23 21 - 32 mmol/L    Anion Gap 19 10 - 20 mmol/L    Urea Nitrogen 50 (H) 6 - 23 mg/dL    Creatinine 11.66 (H) 0.50 - 1.30 mg/dL    eGFR 4 (L) >60 mL/min/1.73m*2    Calcium 9.0 8.6 - 10.6 mg/dL    Phosphorus 4.8 2.5 - 4.9 mg/dL    Albumin 3.2 (L) 3.4 - 5.0 g/dL   CBC and Auto Differential   Result Value Ref Range    WBC 2.6 (L) 4.4 - 11.3 x10*3/uL    nRBC 0.0 0.0 - 0.0 /100 WBCs    RBC 3.74 (L) 4.50 - 5.90 x10*6/uL    Hemoglobin 10.3 (L) 13.5 - 17.5 g/dL    Hematocrit 30.9 (L) 41.0 - 52.0 %    MCV 83 80 - 100 fL    MCH 27.5 26.0 - 34.0 pg    MCHC 33.3 32.0 - 36.0 g/dL    RDW 14.6 (H) 11.5 - 14.5 %    Platelets 112 (L) 150 - 450 x10*3/uL     Neutrophils % 83.5 40.0 - 80.0 %    Immature Granulocytes %, Automated 1.2 (H) 0.0 - 0.9 %    Lymphocytes % 11.8 13.0 - 44.0 %    Monocytes % 3.5 2.0 - 10.0 %    Eosinophils % 0.0 0.0 - 6.0 %    Basophils % 0.0 0.0 - 2.0 %    Neutrophils Absolute 2.13 1.20 - 7.70 x10*3/uL    Immature Granulocytes Absolute, Automated 0.03 0.00 - 0.70 x10*3/uL    Lymphocytes Absolute 0.30 (L) 1.20 - 4.80 x10*3/uL    Monocytes Absolute 0.09 (L) 0.10 - 1.00 x10*3/uL    Eosinophils Absolute 0.00 0.00 - 0.70 x10*3/uL    Basophils Absolute 0.00 0.00 - 0.10 x10*3/uL   Tacrolimus level   Result Value Ref Range    Tacrolimus  <2.0 <=15.0 ng/mL   Hepatitis B surface antigen   Result Value Ref Range    Hepatitis B Surface AG Nonreactive Nonreactive   Hepatitis B surface antibody   Result Value Ref Range    Hepatitis B Surface AB 3.4 <10.0 mIU/mL   POCT GLUCOSE   Result Value Ref Range    POCT Glucose 118 (H) 74 - 99 mg/dL   POCT GLUCOSE   Result Value Ref Range    POCT Glucose 234 (H) 74 - 99 mg/dL   POCT GLUCOSE   Result Value Ref Range    POCT Glucose 276 (H) 74 - 99 mg/dL   Magnesium   Result Value Ref Range    Magnesium 1.95 1.60 - 2.40 mg/dL   Renal Function Panel   Result Value Ref Range    Glucose 283 (H) 74 - 99 mg/dL    Sodium 131 (L) 136 - 145 mmol/L    Potassium 4.9 3.5 - 5.3 mmol/L    Chloride 94 (L) 98 - 107 mmol/L    Bicarbonate 27 21 - 32 mmol/L    Anion Gap 15 10 - 20 mmol/L    Urea Nitrogen 36 (H) 6 - 23 mg/dL    Creatinine 8.31 (H) 0.50 - 1.30 mg/dL    eGFR 6 (L) >60 mL/min/1.73m*2    Calcium 8.4 (L) 8.6 - 10.6 mg/dL    Phosphorus 5.1 (H) 2.5 - 4.9 mg/dL    Albumin 3.1 (L) 3.4 - 5.0 g/dL   CBC and Auto Differential   Result Value Ref Range    WBC 3.3 (L) 4.4 - 11.3 x10*3/uL    nRBC 0.0 0.0 - 0.0 /100 WBCs    RBC 3.28 (L) 4.50 - 5.90 x10*6/uL    Hemoglobin 9.0 (L) 13.5 - 17.5 g/dL    Hematocrit 27.7 (L) 41.0 - 52.0 %    MCV 85 80 - 100 fL    MCH 27.4 26.0 - 34.0 pg    MCHC 32.5 32.0 - 36.0 g/dL    RDW 14.7 (H) 11.5 - 14.5 %     Platelets 136 (L) 150 - 450 x10*3/uL    Neutrophils % 72.0 40.0 - 80.0 %    Immature Granulocytes %, Automated 0.6 0.0 - 0.9 %    Lymphocytes % 14.9 13.0 - 44.0 %    Monocytes % 12.2 2.0 - 10.0 %    Eosinophils % 0.0 0.0 - 6.0 %    Basophils % 0.3 0.0 - 2.0 %    Neutrophils Absolute 2.37 1.20 - 7.70 x10*3/uL    Immature Granulocytes Absolute, Automated 0.02 0.00 - 0.70 x10*3/uL    Lymphocytes Absolute 0.49 (L) 1.20 - 4.80 x10*3/uL    Monocytes Absolute 0.40 0.10 - 1.00 x10*3/uL    Eosinophils Absolute 0.00 0.00 - 0.70 x10*3/uL    Basophils Absolute 0.01 0.00 - 0.10 x10*3/uL   POCT GLUCOSE   Result Value Ref Range    POCT Glucose 256 (H) 74 - 99 mg/dL     *Note: Due to a large number of results and/or encounters for the requested time period, some results have not been displayed. A complete set of results can be found in Results Review.       Assessment/Plan   Assessment & Plan  Pyelonephritis    Chronic rejection of kidney transplant ([facility])    Manolo Bashir is a 69 y.o. male presenting with LLQ allograft tenderness and gross hematuria.  He has ESKD attributed to hypertension since 1998 s/p kidney transplant #1 3/26/1992 that failed 11/13/2006), s/p kidney transplant #2 7/13/2013 which failed in May 2024 following which he has been on HD (hospitals, [facility] via LUE AVG) . He also has known MGUS with IgG and IgA lambda (bone marrow bx 10/2023 with no plasma cell dyscrasia), DM2, HTN, prostate cancer s/p radical prostatectomy, baseline urinary incontinence, HCV s/p treatment (recent HCV PCR negative 7/2024).      ESKD  HD today     Graft intolerance syndrome/chronic rejection  - Solumedrol 125 mg/day x 3 days (1/6-1/8/25) then prednisone 40 mg/day x 5 days then 20 mg/day x 5 days then 10 mg/day x 5 days then keep at prednisone 5 mg/day  - keep TAC 0.5 mg BID for now  - empiric antibiotics for at least 3 days     Hypertension  - Coreg 25 mg BID  - Nifedipine 90 mg BID  - Volume management during dialysis  - Low salt  diet     CKD-MBD  - Calcitriol 0.5 mg/day  - Sensipar 30 mg/day  - Dietary phosphorus restriction     Heme - acceptable on DARIANA     Diabetes  - basal-bolus regimen per primary. May need further adjustment while on high dose steroid  - ASA, pravastatin     Nutrition - renal vitamins, renal dialysis diet     Re-transplant  Evaluated by  on 7/10/24: reasonable candidate for 3rd kidney transplant.  No potential living donor.    Ok to DC home from renal standpoint on rapid prednisone taper  Will monitor in clinic    Betty Ireland MD

## 2025-01-08 NOTE — DISCHARGE INSTRUCTIONS
Discharge Instructions  Dear Manolo Bashir,    You were admitted to [facility] from 1/5/2025 to 1/8/2025 for abdominal pain and blood in your urine. You were admitted to the hospital and our transplant nephrology experts were consulted and following given your history of kidney transplant. There was a concern for chronic rejection of your kidney transplant so you were treated with three day course of high dose steroids. You will need to be on steroids for the next three weeks as well as instructed as below. Additionally, you were treated with a three day course of antibiotics as there was a suspicion for a kidney infection as well. Lastly, your insulin regimen as instructed below was also adjusted as your blood sugar levels can run high while on steroids. You were deemed stable to go home on 1/8/2025.    Medication changes:  Start these medications:  - Take prednisone 40 mg/day for 5 days (1/9-1/13) then 20 mg/day for 5 days (1/14 - 1/18) then 10 mg/day for 5 days (1/19 - 1/23) then continue taking prednisone 5 mg/day everyday   - Nifedipine 90mg twice a day everyday   - Bactrim 1 tablet everyday until 2/7/2025  Stop these medications:  - Hydralazine 100mg   - Reglan 5mg   - Nifedipine 60mg   Change how you are taking these medications:  - Please increase your nightly glargine (lantus) to 20U.  - Please increase your mealtime insulin (lispro) to 3U before meals.     If you would like to  your medications from another pharmacy, ask your pharmacy to contact [facility] pharmacy to transfer over the prescriptions    Appointments/Follow-Up:   We have requested an automated system to call you to schedule; however if you do not hear from them in 3 days, please call [facility] appointment line: [phone] or [phone] to make the appointment yourself    Please follow up with Transplant Nephrology outpatient   Please follow up with Endocrine outpatient for diabetes management   Please  call and schedule appointment with your primary care doctor within 2 week, tell them that you were recently admitted to the hospital.   If you need to establish with new primary care doctor, please call and schedule an appointment with [facility]: phone: [phone]    It was a pleasure taking care of you,  Your  Care Team

## 2025-01-08 NOTE — CARE PLAN
The patient's goals for the shift include to feel better    The clinical goals for the shift include Patient will have decreased pain      Problem: Pain - Adult  Goal: Verbalizes/displays adequate comfort level or baseline comfort level  Outcome: Progressing     Problem: Safety - Adult  Goal: Free from fall injury  Outcome: Progressing     Problem: Discharge Planning  Goal: Discharge to home or other facility with appropriate resources  Outcome: Progressing     Problem: Chronic Conditions and Co-morbidities  Goal: Patient's chronic conditions and co-morbidity symptoms are monitored and maintained or improved  Outcome: Progressing

## 2025-01-15 PROCEDURE — RXMED WILLOW AMBULATORY MEDICATION CHARGE

## 2025-01-20 ENCOUNTER — TELEPHONE (OUTPATIENT)
Facility: HOSPITAL | Age: 70
End: 2025-01-20
Payer: MEDICARE

## 2025-01-20 ENCOUNTER — PHARMACY VISIT (OUTPATIENT)
Dept: PHARMACY | Facility: CLINIC | Age: 70
End: 2025-01-20
Payer: COMMERCIAL

## 2025-01-21 DIAGNOSIS — I50.32 CHRONIC HEART FAILURE WITH PRESERVED EJECTION FRACTION: Primary | ICD-10-CM

## 2025-01-21 PROCEDURE — RXMED WILLOW AMBULATORY MEDICATION CHARGE

## 2025-01-21 RX ORDER — CARVEDILOL 12.5 MG/1
37.5 TABLET ORAL 2 TIMES DAILY
Qty: 180 TABLET | Refills: 1 | Status: ON HOLD | OUTPATIENT
Start: 2025-01-21 | End: 2025-03-22

## 2025-01-23 ENCOUNTER — PHARMACY VISIT (OUTPATIENT)
Dept: PHARMACY | Facility: CLINIC | Age: 70
End: 2025-01-23
Payer: COMMERCIAL

## 2025-01-25 ENCOUNTER — PHARMACY VISIT (OUTPATIENT)
Dept: PHARMACY | Facility: CLINIC | Age: 70
End: 2025-01-25
Payer: COMMERCIAL

## 2025-01-25 PROCEDURE — RXMED WILLOW AMBULATORY MEDICATION CHARGE

## 2025-01-26 ENCOUNTER — HOSPITAL ENCOUNTER (INPATIENT)
Facility: HOSPITAL | Age: 70
DRG: 640 | End: 2025-01-26
Attending: EMERGENCY MEDICINE | Admitting: INTERNAL MEDICINE
Payer: MEDICARE

## 2025-01-26 ENCOUNTER — APPOINTMENT (OUTPATIENT)
Dept: RADIOLOGY | Facility: HOSPITAL | Age: 70
DRG: 640 | End: 2025-01-26
Payer: MEDICARE

## 2025-01-26 ENCOUNTER — CLINICAL SUPPORT (OUTPATIENT)
Dept: EMERGENCY MEDICINE | Facility: HOSPITAL | Age: 70
End: 2025-01-26
Payer: MEDICARE

## 2025-01-26 VITALS
BODY MASS INDEX: 24.25 KG/M2 | HEART RATE: 70 BPM | HEIGHT: 68 IN | WEIGHT: 160 LBS | RESPIRATION RATE: 20 BRPM | DIASTOLIC BLOOD PRESSURE: 78 MMHG | OXYGEN SATURATION: 97 % | SYSTOLIC BLOOD PRESSURE: 143 MMHG | TEMPERATURE: 98.4 F

## 2025-01-26 DIAGNOSIS — Z79.4 TYPE 2 DIABETES MELLITUS WITHOUT COMPLICATION, WITH LONG-TERM CURRENT USE OF INSULIN (MULTI): ICD-10-CM

## 2025-01-26 DIAGNOSIS — Z99.2 ESRD (END STAGE RENAL DISEASE) ON DIALYSIS (MULTI): ICD-10-CM

## 2025-01-26 DIAGNOSIS — N18.6 ESRD (END STAGE RENAL DISEASE) ON DIALYSIS (MULTI): ICD-10-CM

## 2025-01-26 DIAGNOSIS — J18.9 PNEUMONIA DUE TO INFECTIOUS ORGANISM, UNSPECIFIED LATERALITY, UNSPECIFIED PART OF LUNG: ICD-10-CM

## 2025-01-26 DIAGNOSIS — E11.9 TYPE 2 DIABETES MELLITUS WITHOUT COMPLICATION, WITH LONG-TERM CURRENT USE OF INSULIN (MULTI): ICD-10-CM

## 2025-01-26 DIAGNOSIS — T86.11 CHRONIC REJECTION OF KIDNEY TRANSPLANT (HHS-HCC): ICD-10-CM

## 2025-01-26 DIAGNOSIS — A04.72 C. DIFFICILE COLITIS: ICD-10-CM

## 2025-01-26 DIAGNOSIS — N18.4 TYPE 2 DIABETES MELLITUS WITH STAGE 4 CHRONIC KIDNEY DISEASE, WITH LONG-TERM CURRENT USE OF INSULIN (MULTI): ICD-10-CM

## 2025-01-26 DIAGNOSIS — Z79.4 TYPE 2 DIABETES MELLITUS WITH STAGE 4 CHRONIC KIDNEY DISEASE, WITH LONG-TERM CURRENT USE OF INSULIN (MULTI): ICD-10-CM

## 2025-01-26 DIAGNOSIS — Z01.818 PRE-TRANSPLANT EVALUATION FOR KIDNEY TRANSPLANT: ICD-10-CM

## 2025-01-26 DIAGNOSIS — I50.32 CHRONIC HEART FAILURE WITH PRESERVED EJECTION FRACTION: ICD-10-CM

## 2025-01-26 DIAGNOSIS — E87.70 HYPERVOLEMIA, UNSPECIFIED HYPERVOLEMIA TYPE: ICD-10-CM

## 2025-01-26 DIAGNOSIS — R06.02 SHORTNESS OF BREATH: Primary | ICD-10-CM

## 2025-01-26 DIAGNOSIS — E11.22 TYPE 2 DIABETES MELLITUS WITH STAGE 4 CHRONIC KIDNEY DISEASE, WITH LONG-TERM CURRENT USE OF INSULIN (MULTI): ICD-10-CM

## 2025-01-26 LAB
ALBUMIN SERPL BCP-MCNC: 3.7 G/DL (ref 3.4–5)
ALP SERPL-CCNC: 70 U/L (ref 33–136)
ALT SERPL W P-5'-P-CCNC: 11 U/L (ref 10–52)
ANION GAP BLDV CALCULATED.4IONS-SCNC: 11 MMOL/L (ref 10–25)
ANION GAP SERPL CALC-SCNC: 15 MMOL/L (ref 10–20)
AST SERPL W P-5'-P-CCNC: 32 U/L (ref 9–39)
ATRIAL RATE: 82 BPM
BACTERIA BLD CULT: NORMAL
BACTERIA BLD CULT: NORMAL
BASE EXCESS BLDV CALC-SCNC: 3.9 MMOL/L (ref -2–3)
BASOPHILS # BLD AUTO: 0.02 X10*3/UL (ref 0–0.1)
BASOPHILS NFR BLD AUTO: 0.4 %
BILIRUB SERPL-MCNC: 0.6 MG/DL (ref 0–1.2)
BNP SERPL-MCNC: 2810 PG/ML (ref 0–99)
BODY TEMPERATURE: 37 DEGREES CELSIUS
BUN SERPL-MCNC: 30 MG/DL (ref 6–23)
CA-I BLDV-SCNC: 1.06 MMOL/L (ref 1.1–1.33)
CALCIUM SERPL-MCNC: 8.9 MG/DL (ref 8.6–10.6)
CARDIAC TROPONIN I PNL SERPL HS: 65 NG/L (ref 0–53)
CARDIAC TROPONIN I PNL SERPL HS: 86 NG/L (ref 0–53)
CARDIAC TROPONIN I PNL SERPL HS: 87 NG/L (ref 0–53)
CHLORIDE BLDV-SCNC: 98 MMOL/L (ref 98–107)
CHLORIDE SERPL-SCNC: 96 MMOL/L (ref 98–107)
CO2 SERPL-SCNC: 29 MMOL/L (ref 21–32)
CREAT SERPL-MCNC: 7.88 MG/DL (ref 0.5–1.3)
EGFRCR SERPLBLD CKD-EPI 2021: 7 ML/MIN/1.73M*2
EOSINOPHIL # BLD AUTO: 0.08 X10*3/UL (ref 0–0.7)
EOSINOPHIL NFR BLD AUTO: 1.6 %
ERYTHROCYTE [DISTWIDTH] IN BLOOD BY AUTOMATED COUNT: 18.2 % (ref 11.5–14.5)
FLUAV RNA RESP QL NAA+PROBE: NOT DETECTED
FLUBV RNA RESP QL NAA+PROBE: NOT DETECTED
GLUCOSE BLD MANUAL STRIP-MCNC: 115 MG/DL (ref 74–99)
GLUCOSE BLD MANUAL STRIP-MCNC: 74 MG/DL (ref 74–99)
GLUCOSE BLDV-MCNC: 215 MG/DL (ref 74–99)
GLUCOSE SERPL-MCNC: 208 MG/DL (ref 74–99)
HADV DNA SPEC QL NAA+PROBE: NOT DETECTED
HCO3 BLDV-SCNC: 28.5 MMOL/L (ref 22–26)
HCT VFR BLD AUTO: 32.1 % (ref 41–52)
HCT VFR BLD EST: 32 % (ref 41–52)
HGB BLD-MCNC: 10.4 G/DL (ref 13.5–17.5)
HGB BLDV-MCNC: 10.8 G/DL (ref 13.5–17.5)
HMPV RNA SPEC QL NAA+PROBE: NOT DETECTED
HPIV1 RNA SPEC QL NAA+PROBE: NOT DETECTED
HPIV2 RNA SPEC QL NAA+PROBE: NOT DETECTED
HPIV3 RNA SPEC QL NAA+PROBE: NOT DETECTED
HPIV4 RNA SPEC QL NAA+PROBE: NOT DETECTED
IMM GRANULOCYTES # BLD AUTO: 0.02 X10*3/UL (ref 0–0.7)
IMM GRANULOCYTES NFR BLD AUTO: 0.4 % (ref 0–0.9)
LACTATE BLDV-SCNC: 1.6 MMOL/L (ref 0.4–2)
LYMPHOCYTES # BLD AUTO: 0.52 X10*3/UL (ref 1.2–4.8)
LYMPHOCYTES NFR BLD AUTO: 10.7 %
MAGNESIUM SERPL-MCNC: 1.81 MG/DL (ref 1.6–2.4)
MCH RBC QN AUTO: 29 PG (ref 26–34)
MCHC RBC AUTO-ENTMCNC: 32.4 G/DL (ref 32–36)
MCV RBC AUTO: 89 FL (ref 80–100)
MONOCYTES # BLD AUTO: 0.41 X10*3/UL (ref 0.1–1)
MONOCYTES NFR BLD AUTO: 8.4 %
MRSA DNA SPEC QL NAA+PROBE: NOT DETECTED
NEUTROPHILS # BLD AUTO: 3.81 X10*3/UL (ref 1.2–7.7)
NEUTROPHILS NFR BLD AUTO: 78.5 %
NRBC BLD-RTO: 0 /100 WBCS (ref 0–0)
OXYHGB MFR BLDV: 70.9 % (ref 45–75)
P AXIS: 59 DEGREES
P OFFSET: 165 MS
P ONSET: 136 MS
PCO2 BLDV: 42 MM HG (ref 41–51)
PH BLDV: 7.44 PH (ref 7.33–7.43)
PHOSPHATE SERPL-MCNC: 5 MG/DL (ref 2.5–4.9)
PLATELET # BLD AUTO: 57 X10*3/UL (ref 150–450)
PO2 BLDV: 46 MM HG (ref 35–45)
POTASSIUM BLDV-SCNC: 6.2 MMOL/L (ref 3.5–5.3)
POTASSIUM SERPL-SCNC: 5.2 MMOL/L (ref 3.5–5.3)
PR INTERVAL: 164 MS
PROT SERPL-MCNC: 6.9 G/DL (ref 6.4–8.2)
Q ONSET: 218 MS
QRS COUNT: 14 BEATS
QRS DURATION: 138 MS
QT INTERVAL: 386 MS
QTC CALCULATION(BAZETT): 450 MS
QTC FREDERICIA: 428 MS
R AXIS: -15 DEGREES
RBC # BLD AUTO: 3.59 X10*6/UL (ref 4.5–5.9)
RHINOVIRUS RNA UPPER RESP QL NAA+PROBE: NOT DETECTED
RSV RNA RESP QL NAA+PROBE: NOT DETECTED
SAO2 % BLDV: 73 % (ref 45–75)
SARS-COV-2 RNA RESP QL NAA+PROBE: NOT DETECTED
SODIUM BLDV-SCNC: 131 MMOL/L (ref 136–145)
SODIUM SERPL-SCNC: 135 MMOL/L (ref 136–145)
T AXIS: 69 DEGREES
T OFFSET: 411 MS
TACROLIMUS BLD-MCNC: <2 NG/ML
VENTRICULAR RATE: 82 BPM
WBC # BLD AUTO: 4.9 X10*3/UL (ref 4.4–11.3)

## 2025-01-26 PROCEDURE — 82947 ASSAY GLUCOSE BLOOD QUANT: CPT

## 2025-01-26 PROCEDURE — 71046 X-RAY EXAM CHEST 2 VIEWS: CPT

## 2025-01-26 PROCEDURE — 84132 ASSAY OF SERUM POTASSIUM: CPT

## 2025-01-26 PROCEDURE — 87641 MR-STAPH DNA AMP PROBE: CPT

## 2025-01-26 PROCEDURE — 87040 BLOOD CULTURE FOR BACTERIA: CPT

## 2025-01-26 PROCEDURE — 2500000004 HC RX 250 GENERAL PHARMACY W/ HCPCS (ALT 636 FOR OP/ED): Mod: JZ,TB

## 2025-01-26 PROCEDURE — 2500000001 HC RX 250 WO HCPCS SELF ADMINISTERED DRUGS (ALT 637 FOR MEDICARE OP)

## 2025-01-26 PROCEDURE — 87798 DETECT AGENT NOS DNA AMP: CPT

## 2025-01-26 PROCEDURE — 80197 ASSAY OF TACROLIMUS: CPT

## 2025-01-26 PROCEDURE — 84484 ASSAY OF TROPONIN QUANT: CPT

## 2025-01-26 PROCEDURE — 36415 COLL VENOUS BLD VENIPUNCTURE: CPT

## 2025-01-26 PROCEDURE — 2500000005 HC RX 250 GENERAL PHARMACY W/O HCPCS

## 2025-01-26 PROCEDURE — 93010 ELECTROCARDIOGRAM REPORT: CPT | Performed by: EMERGENCY MEDICINE

## 2025-01-26 PROCEDURE — 2500000004 HC RX 250 GENERAL PHARMACY W/ HCPCS (ALT 636 FOR OP/ED)

## 2025-01-26 PROCEDURE — 83880 ASSAY OF NATRIURETIC PEPTIDE: CPT

## 2025-01-26 PROCEDURE — 85025 COMPLETE CBC W/AUTO DIFF WBC: CPT

## 2025-01-26 PROCEDURE — 96365 THER/PROPH/DIAG IV INF INIT: CPT

## 2025-01-26 PROCEDURE — 2500000002 HC RX 250 W HCPCS SELF ADMINISTERED DRUGS (ALT 637 FOR MEDICARE OP, ALT 636 FOR OP/ED)

## 2025-01-26 PROCEDURE — 99285 EMERGENCY DEPT VISIT HI MDM: CPT | Mod: 25 | Performed by: EMERGENCY MEDICINE

## 2025-01-26 PROCEDURE — 83735 ASSAY OF MAGNESIUM: CPT

## 2025-01-26 PROCEDURE — 87631 RESP VIRUS 3-5 TARGETS: CPT

## 2025-01-26 PROCEDURE — 99285 EMERGENCY DEPT VISIT HI MDM: CPT | Performed by: EMERGENCY MEDICINE

## 2025-01-26 PROCEDURE — 84145 PROCALCITONIN (PCT): CPT

## 2025-01-26 PROCEDURE — 1210000001 HC SEMI-PRIVATE ROOM DAILY

## 2025-01-26 PROCEDURE — 87637 SARSCOV2&INF A&B&RSV AMP PRB: CPT

## 2025-01-26 PROCEDURE — 71046 X-RAY EXAM CHEST 2 VIEWS: CPT | Performed by: STUDENT IN AN ORGANIZED HEALTH CARE EDUCATION/TRAINING PROGRAM

## 2025-01-26 PROCEDURE — 93005 ELECTROCARDIOGRAM TRACING: CPT

## 2025-01-26 PROCEDURE — 84100 ASSAY OF PHOSPHORUS: CPT

## 2025-01-26 PROCEDURE — 99222 1ST HOSP IP/OBS MODERATE 55: CPT | Performed by: INTERNAL MEDICINE

## 2025-01-26 RX ORDER — DEXTROSE 50 % IN WATER (D50W) INTRAVENOUS SYRINGE
25
Status: DISCONTINUED | OUTPATIENT
Start: 2025-01-26 | End: 2025-02-05 | Stop reason: HOSPADM

## 2025-01-26 RX ORDER — TACROLIMUS 1 MG/G
OINTMENT TOPICAL 2 TIMES DAILY
Status: CANCELLED | OUTPATIENT
Start: 2025-01-26

## 2025-01-26 RX ORDER — DEXTROSE 50 % IN WATER (D50W) INTRAVENOUS SYRINGE
12.5
Status: CANCELLED | OUTPATIENT
Start: 2025-01-26

## 2025-01-26 RX ORDER — VANCOMYCIN HYDROCHLORIDE 1 G/20ML
INJECTION, POWDER, LYOPHILIZED, FOR SOLUTION INTRAVENOUS DAILY PRN
Status: DISCONTINUED | OUTPATIENT
Start: 2025-01-26 | End: 2025-01-27

## 2025-01-26 RX ORDER — NEOMYCIN SULFATE, POLYMYXIN B SULFATE AND DEXAMETHASONE 3.5; 10000; 1 MG/ML; [USP'U]/ML; MG/ML
1 SUSPENSION/ DROPS OPHTHALMIC EVERY 8 HOURS SCHEDULED
Status: DISCONTINUED | OUTPATIENT
Start: 2025-01-26 | End: 2025-02-05 | Stop reason: HOSPADM

## 2025-01-26 RX ORDER — CARVEDILOL 12.5 MG/1
25 TABLET ORAL 2 TIMES DAILY
Status: DISCONTINUED | OUTPATIENT
Start: 2025-01-26 | End: 2025-01-27

## 2025-01-26 RX ORDER — INSULIN GLARGINE 100 [IU]/ML
15 INJECTION, SOLUTION SUBCUTANEOUS EVERY MORNING
Status: CANCELLED | OUTPATIENT
Start: 2025-01-27

## 2025-01-26 RX ORDER — CALCIUM GLUCONATE 20 MG/ML
2 INJECTION, SOLUTION INTRAVENOUS ONCE
Status: COMPLETED | OUTPATIENT
Start: 2025-01-26 | End: 2025-01-26

## 2025-01-26 RX ORDER — TACROLIMUS 0.5 MG/1
0.5 CAPSULE ORAL 2 TIMES DAILY
Status: DISCONTINUED | OUTPATIENT
Start: 2025-01-26 | End: 2025-02-05 | Stop reason: HOSPADM

## 2025-01-26 RX ORDER — KETOROLAC TROMETHAMINE 5 MG/ML
1 SOLUTION OPHTHALMIC 3 TIMES DAILY
Status: DISCONTINUED | OUTPATIENT
Start: 2025-01-26 | End: 2025-02-05 | Stop reason: HOSPADM

## 2025-01-26 RX ORDER — INSULIN LISPRO 100 [IU]/ML
0-10 INJECTION, SOLUTION INTRAVENOUS; SUBCUTANEOUS
Status: CANCELLED | OUTPATIENT
Start: 2025-01-26

## 2025-01-26 RX ORDER — AZITHROMYCIN 500 MG/1
500 TABLET, FILM COATED ORAL ONCE
Status: COMPLETED | OUTPATIENT
Start: 2025-01-26 | End: 2025-01-26

## 2025-01-26 RX ORDER — DEXTROSE 50 % IN WATER (D50W) INTRAVENOUS SYRINGE
25
Status: CANCELLED | OUTPATIENT
Start: 2025-01-26

## 2025-01-26 RX ORDER — POLYETHYLENE GLYCOL 3350 17 G/17G
17 POWDER, FOR SOLUTION ORAL DAILY PRN
Status: DISCONTINUED | OUTPATIENT
Start: 2025-01-26 | End: 2025-02-05 | Stop reason: HOSPADM

## 2025-01-26 RX ORDER — ISOSORBIDE MONONITRATE 30 MG/1
60 TABLET, EXTENDED RELEASE ORAL DAILY
Status: CANCELLED | OUTPATIENT
Start: 2025-01-26

## 2025-01-26 RX ORDER — DEXTROSE 50 % IN WATER (D50W) INTRAVENOUS SYRINGE
12.5
Status: DISCONTINUED | OUTPATIENT
Start: 2025-01-26 | End: 2025-02-05 | Stop reason: HOSPADM

## 2025-01-26 RX ORDER — IMIQUIMOD 12.5 MG/.25G
1 CREAM TOPICAL 3 TIMES WEEKLY
Status: DISCONTINUED | OUTPATIENT
Start: 2025-01-27 | End: 2025-02-05 | Stop reason: HOSPADM

## 2025-01-26 RX ORDER — CARVEDILOL 12.5 MG/1
37.5 TABLET ORAL 2 TIMES DAILY
Status: DISCONTINUED | OUTPATIENT
Start: 2025-01-26 | End: 2025-01-26

## 2025-01-26 RX ORDER — BRIMONIDINE TARTRATE 2 MG/ML
1 SOLUTION/ DROPS OPHTHALMIC 2 TIMES DAILY
Status: DISCONTINUED | OUTPATIENT
Start: 2025-01-26 | End: 2025-02-05 | Stop reason: HOSPADM

## 2025-01-26 RX ORDER — BUMETANIDE 0.25 MG/ML
4 INJECTION, SOLUTION INTRAMUSCULAR; INTRAVENOUS ONCE
Status: DISCONTINUED | OUTPATIENT
Start: 2025-01-26 | End: 2025-01-31

## 2025-01-26 RX ORDER — CALCITRIOL 0.5 UG/1
0.5 CAPSULE ORAL DAILY
Status: DISCONTINUED | OUTPATIENT
Start: 2025-01-26 | End: 2025-01-27

## 2025-01-26 RX ORDER — CEFTRIAXONE 1 G/50ML
1 INJECTION, SOLUTION INTRAVENOUS ONCE
Status: COMPLETED | OUTPATIENT
Start: 2025-01-26 | End: 2025-01-26

## 2025-01-26 RX ORDER — NIFEDIPINE 30 MG/1
90 TABLET, FILM COATED, EXTENDED RELEASE ORAL 2 TIMES DAILY
Status: CANCELLED | OUTPATIENT
Start: 2025-01-26

## 2025-01-26 RX ORDER — PRAVASTATIN SODIUM 10 MG/1
10 TABLET ORAL NIGHTLY
Status: DISCONTINUED | OUTPATIENT
Start: 2025-01-26 | End: 2025-02-05 | Stop reason: HOSPADM

## 2025-01-26 RX ORDER — SULFAMETHOXAZOLE AND TRIMETHOPRIM 800; 160 MG/1; MG/1
1 TABLET ORAL DAILY
Status: DISCONTINUED | OUTPATIENT
Start: 2025-01-26 | End: 2025-01-27

## 2025-01-26 RX ORDER — AZITHROMYCIN 500 MG/1
500 TABLET, FILM COATED ORAL
Status: COMPLETED | OUTPATIENT
Start: 2025-01-27 | End: 2025-01-28

## 2025-01-26 RX ORDER — ACETAMINOPHEN 325 MG/1
975 TABLET ORAL ONCE
Status: COMPLETED | OUTPATIENT
Start: 2025-01-26 | End: 2025-01-26

## 2025-01-26 RX ORDER — VANCOMYCIN HYDROCHLORIDE 750 MG/150ML
750 INJECTION, SOLUTION INTRAVENOUS ONCE
Status: COMPLETED | OUTPATIENT
Start: 2025-01-26 | End: 2025-01-26

## 2025-01-26 RX ORDER — PANTOPRAZOLE SODIUM 40 MG/1
40 TABLET, DELAYED RELEASE ORAL
Status: DISCONTINUED | OUTPATIENT
Start: 2025-01-27 | End: 2025-02-05 | Stop reason: HOSPADM

## 2025-01-26 RX ORDER — ACETAMINOPHEN 325 MG/1
975 TABLET ORAL EVERY 8 HOURS PRN
Status: DISCONTINUED | OUTPATIENT
Start: 2025-01-26 | End: 2025-01-30

## 2025-01-26 RX ORDER — OXYCODONE HYDROCHLORIDE 5 MG/1
5 TABLET ORAL ONCE
Status: COMPLETED | OUTPATIENT
Start: 2025-01-26 | End: 2025-01-26

## 2025-01-26 RX ORDER — PREDNISONE 5 MG/1
5 TABLET ORAL DAILY
Status: DISCONTINUED | OUTPATIENT
Start: 2025-01-26 | End: 2025-01-30

## 2025-01-26 RX ORDER — NIFEDIPINE 90 MG/1
90 TABLET, EXTENDED RELEASE ORAL
Status: DISCONTINUED | OUTPATIENT
Start: 2025-01-27 | End: 2025-01-31

## 2025-01-26 RX ORDER — CINACALCET 30 MG/1
30 TABLET, FILM COATED ORAL DAILY
Status: DISCONTINUED | OUTPATIENT
Start: 2025-01-26 | End: 2025-02-05 | Stop reason: HOSPADM

## 2025-01-26 RX ORDER — VANCOMYCIN HYDROCHLORIDE 1 G/200ML
1000 INJECTION, SOLUTION INTRAVENOUS ONCE
Status: COMPLETED | OUTPATIENT
Start: 2025-01-26 | End: 2025-01-26

## 2025-01-26 RX ORDER — INSULIN LISPRO 100 [IU]/ML
0-10 INJECTION, SOLUTION INTRAVENOUS; SUBCUTANEOUS
Status: DISCONTINUED | OUTPATIENT
Start: 2025-01-26 | End: 2025-02-05 | Stop reason: HOSPADM

## 2025-01-26 RX ORDER — HEPARIN SODIUM 5000 [USP'U]/ML
5000 INJECTION, SOLUTION INTRAVENOUS; SUBCUTANEOUS EVERY 8 HOURS SCHEDULED
Status: DISCONTINUED | OUTPATIENT
Start: 2025-01-26 | End: 2025-02-05 | Stop reason: HOSPADM

## 2025-01-26 RX ORDER — INSULIN GLARGINE 100 [IU]/ML
15 INJECTION, SOLUTION SUBCUTANEOUS EVERY MORNING
Status: DISCONTINUED | OUTPATIENT
Start: 2025-01-27 | End: 2025-02-02

## 2025-01-26 RX ADMIN — RENO CAPS 1 CAPSULE: 100; 1.5; 1.7; 20; 10; 1; 150; 5; 6 CAPSULE ORAL at 14:10

## 2025-01-26 RX ADMIN — PREDNISONE 5 MG: 5 TABLET ORAL at 14:11

## 2025-01-26 RX ADMIN — HEPARIN SODIUM 5000 UNITS: 5000 INJECTION, SOLUTION INTRAVENOUS; SUBCUTANEOUS at 21:18

## 2025-01-26 RX ADMIN — TACROLIMUS 0.5 MG: 0.5 CAPSULE ORAL at 14:11

## 2025-01-26 RX ADMIN — CALCIUM GLUCONATE 2 G: 20 INJECTION, SOLUTION INTRAVENOUS at 06:55

## 2025-01-26 RX ADMIN — HEPARIN SODIUM 5000 UNITS: 5000 INJECTION, SOLUTION INTRAVENOUS; SUBCUTANEOUS at 14:10

## 2025-01-26 RX ADMIN — CALCITRIOL 0.5 MCG: 0.5 CAPSULE ORAL at 18:22

## 2025-01-26 RX ADMIN — BRIMONIDINE TARTRATE 1 DROP: 2 SOLUTION/ DROPS OPHTHALMIC at 14:10

## 2025-01-26 RX ADMIN — PIPERACILLIN SODIUM AND TAZOBACTAM SODIUM 2.25 G: 2; .25 INJECTION, SOLUTION INTRAVENOUS at 19:30

## 2025-01-26 RX ADMIN — CEFTRIAXONE SODIUM 1 G: 1 INJECTION, SOLUTION INTRAVENOUS at 11:23

## 2025-01-26 RX ADMIN — KETOROLAC TROMETHAMINE 1 DROP: 5 SOLUTION OPHTHALMIC at 18:23

## 2025-01-26 RX ADMIN — PRAVASTATIN SODIUM 10 MG: 10 TABLET ORAL at 21:19

## 2025-01-26 RX ADMIN — ACETAMINOPHEN 975 MG: 325 TABLET ORAL at 14:11

## 2025-01-26 RX ADMIN — BRIMONIDINE TARTRATE 1 DROP: 2 SOLUTION/ DROPS OPHTHALMIC at 21:19

## 2025-01-26 RX ADMIN — AZITHROMYCIN DIHYDRATE 500 MG: 500 TABLET ORAL at 11:23

## 2025-01-26 RX ADMIN — NEOMYCIN SULFATE, POLYMYXIN B SULFATE AND DEXAMETHASONE 1 DROP: 3.5; 10000; 1 SUSPENSION OPHTHALMIC at 18:23

## 2025-01-26 RX ADMIN — OXYCODONE 5 MG: 5 TABLET ORAL at 19:00

## 2025-01-26 RX ADMIN — TACROLIMUS 0.5 MG: 0.5 CAPSULE ORAL at 21:18

## 2025-01-26 RX ADMIN — SULFAMETHOXAZOLE AND TRIMETHOPRIM 1 TABLET: 800; 160 TABLET ORAL at 14:11

## 2025-01-26 RX ADMIN — VANCOMYCIN HYDROCHLORIDE 750 MG: 750 INJECTION, SOLUTION INTRAVENOUS at 18:24

## 2025-01-26 RX ADMIN — VANCOMYCIN HYDROCHLORIDE 1000 MG: 1 INJECTION, SOLUTION INTRAVENOUS at 14:11

## 2025-01-26 RX ADMIN — CINACALCET HYDROCHLORIDE 30 MG: 30 TABLET, FILM COATED ORAL at 14:11

## 2025-01-26 RX ADMIN — ACETAMINOPHEN 975 MG: 325 TABLET ORAL at 06:55

## 2025-01-26 ASSESSMENT — LIFESTYLE VARIABLES
EVER FELT BAD OR GUILTY ABOUT YOUR DRINKING: NO
TOTAL SCORE: 0
HAVE YOU EVER FELT YOU SHOULD CUT DOWN ON YOUR DRINKING: NO
HAVE PEOPLE ANNOYED YOU BY CRITICIZING YOUR DRINKING: NO
EVER HAD A DRINK FIRST THING IN THE MORNING TO STEADY YOUR NERVES TO GET RID OF A HANGOVER: NO

## 2025-01-26 ASSESSMENT — PAIN DESCRIPTION - PROGRESSION
CLINICAL_PROGRESSION: NOT CHANGED
CLINICAL_PROGRESSION: RESOLVED

## 2025-01-26 ASSESSMENT — PAIN DESCRIPTION - ONSET: ONSET: SUDDEN

## 2025-01-26 ASSESSMENT — PAIN SCALES - GENERAL
PAINLEVEL_OUTOF10: 0 - NO PAIN
PAINLEVEL_OUTOF10: 0 - NO PAIN
PAINLEVEL_OUTOF10: 10 - WORST POSSIBLE PAIN

## 2025-01-26 ASSESSMENT — PAIN SCALES - WONG BAKER: WONGBAKER_NUMERICALRESPONSE: HURTS WHOLE LOT

## 2025-01-26 ASSESSMENT — PAIN DESCRIPTION - PAIN TYPE: TYPE: ACUTE PAIN

## 2025-01-26 ASSESSMENT — PAIN DESCRIPTION - LOCATION
LOCATION: HEAD
LOCATION: HEAD

## 2025-01-26 ASSESSMENT — PAIN DESCRIPTION - DESCRIPTORS: DESCRIPTORS: HEADACHE

## 2025-01-26 ASSESSMENT — PAIN - FUNCTIONAL ASSESSMENT
PAIN_FUNCTIONAL_ASSESSMENT: 0-10
PAIN_FUNCTIONAL_ASSESSMENT: 0-10

## 2025-01-26 NOTE — CONSULTS
Vancomycin Dosing by Pharmacy- INITIAL    Manolo Bashir is a 69 y.o. year old male who Pharmacy has been consulted for vancomycin dosing for pneumonia. Based on the patient's indication and renal status this patient will be dosed based on a goal pre-HD level of 20-25.     Renal function is currently iHD (t//sat).    Visit Vitals  /68   Pulse 70   Temp (!) 38.4 °C (101.1 °F) (Tympanic)   Resp (!) 22        Lab Results   Component Value Date    CREATININE 7.88 (H) 2025    CREATININE 8.31 (H) 2025    CREATININE 11.66 (H) 2025    CREATININE 10.58 (H) 2025        Patient weight is as follows:   Vitals:    25 0509   Weight: 72.6 kg (160 lb)       Cultures:  No results found for the encounter in last 14 days.        No intake/output data recorded.  I/O during current shift:  No intake/output data recorded.    Temp (24hrs), Av.4 °C (101.1 °F), Min:38.4 °C (101.1 °F), Max:38.4 °C (101.1 °F)         Assessment/Plan     Patient will be given a loading dose of 1750 mg.    Will initiate vancomycin maintenance, 750 mg to be given after each HD session.    Follow-up level will be ordered prior to 2nd HD session, unless clinically indicated sooner.  Will continue to monitor renal function daily while on vancomycin and order serum creatinine at least every 48 hours if not already ordered.  Follow for continued vancomycin needs, clinical response, and signs/symptoms of toxicity.       Bhumi Saenz, PharmD

## 2025-01-26 NOTE — H&P
"History and Physical   [facility]  January 26, 2025   Patient: Manolo Bashir    Medical Record: [id number]    Subjective   SUBJECTIVE     Manolo Bashir is a 69 y.o. 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 He reports that he started feeling the chills during dialysis yesterday, prior to which he felt fine. Yesterday evening he developed watery diarrhea, nonbloody, and has had 5 episodes since then, most recent this afternoon. He also reports nausea starting around the same time and emesis this AM, endorses flecks of blood but otherwise NBNB. Also endorses a dry cough, nonproductive, along with SOB that worsens when laying down. States he usually sleeps flat but had to sleep on more pillows last night. Denies sick contacts. States that he did not take his medications last night, however did take his nifedipine and Coreg this AM. Endorses current headache, 10/10, x1 hour, new for him. Also reports normal appetite, however feels that the food he eats \"goes right through him.\" Denies dizziness, vision changes, CP, abdominal pain, lower extremity swelling.    Per chart review, patient was admitted 1/3/2025 - 1/8/2025 for pain in LLQ over transplanted kidney. The patient received Solumedrol 125 mg/day x 3 days (1/6-1/8/25) then started on prednisone 40 mg/day x 5 days then 20 mg/day x 5 days then 10 mg/day x 5 days then keep at prednisone 5 mg/day. He received dialysis during the admission and his BP regimen " was changed to nifedipine 60 mg BID, carvedilol 25 mg BID, and imdur 60 mg daily.    Additionally, patient reports having cataract surgery on R eye on Monday, 25. Has been taking eye drops at home as directed.    12 point ROS otherwise negative, unless indicated above.     ED course:  - Vitals:  T 38.4, HR 60-70s, BP 120s-176/50s-70s,  RR 15-18, SpO2 88-95% on RA, Weight 72.6 kg (dry weight 29 kg)  - Labs:  CBC: WBC  4.9, Hgb 10.4, plt 57 (baseline ~90s)  BMP: Na 135, K 5.2, Cl 96, HCO3 29, BUN 30, Cr 7.88 (ESRD on dialysis), glu 208  LFT: Ca 8.9, tprot 6.9, alb 3.7, alkphos 70, AST 32, ALT 11, tbili 0.6  Electrolytes: PO4 5.0, Mg 1.81  hs-TnI 65 -> 87, BNP 2,810 (1,059 2024), lactate 1.6  BCx x2 drawn    - Imaging:  CXR :  IMPRESSION:  1.  Mild central pulmonary vascular congestion with patchy  perihilar/bibasilar airspace opacities. Recommend correlation with  patient's volume status as findings could reflect interstitial edema.  Superimposed infectious process is not definitively excluded.  2. Small bilateral pleural effusions.    - EKG:  NSR, nonspecific intraventricular block, voltage criteria for LVH    Interventions: Tylenol 975 mg for headache, azithromycin 500 mg, calcium gluconate 2 g sodium chloride, Ceftriaxone 1 g    Cardiac/GI Hx   - EK/10/24: NSR, incomplete LBBB, minimal voltage criteria for LVH     - Echocardiography:  TTE 2024   1. Left ventricular systolic function is normal with a 54% estimated ejection fraction.   2. Spectral Doppler shows a pseudonormal pattern of left ventricular diastolic filling.   3. Left ventricular cavity size is severely dilated.   4. Moderately increased left ventricular septal thickness.   5. There is severe left ventricular hypertrophy.   6. Mild to moderate aortic valve regurgitation.   7. The left atrium is severely dilated.   8. The right atrium is severely dilated.   9. Mildly elevated RVSP.  10. Compared with study from 2023, the LV is  now severely dilated. The degree of aortic regurgitation appears unchanged.    - LHC/RHC: N/A    - EGD: 5/18/2018  Impression:            - Normal hypopharynx.                         - Normal esophagus.                         - Bleeding erosive gastropathy. Biopsied.                         - Chronic gastritis. Biopsied.                         - No gross lesions in the duodenal bulb, in the first                          portion of the duodenum and in the second portion of                          the duodenum. Biopsied.  - Colonoscopy:  8/28/23  Impression:              - Decreased sphincter tone found on digital rectal                          exam.                         - The examined portion of the ileum was normal.                         - Patent side-to-side ileo-colonic anastomosis,                          characterized by healthy appearing mucosa.                         - Diverticulosis in the sigmoid colon, in the                          descending colon and in the transverse colon. There                          was no evidence of diverticular bleeding.                         - Non-bleeding external hemorrhoids.                         - No specimens collected.    Past Medical History:    Past Medical History:   Diagnosis Date    Anemia     Arthritis     Cataract     Chronic kidney disease, stage 3 unspecified (Multi) 09/26/2018    Stage 3 chronic kidney disease    CKD (chronic kidney disease)     stage V    Cough 02/12/2024    COVID-19 06/18/2020    COVID-19 virus infection    Diabetes (Multi)     ESRD (end stage renal disease) (Multi)     Focal and segmental proliferative glomerulonephritis 12/23/2023    HTN (hypertension)     Hyperlipidemia     Other long term (current) drug therapy 07/20/2021    High risk medication use    Personal history of other diseases of the circulatory system     Personal history of cardiac murmur    Personal history of other infectious and parasitic diseases  08/17/2015    History of hepatitis    Polyp, colonic 08/17/2023    Primary osteoarthritis of both ankles 08/17/2023    Prostate cancer (Multi)     Tubular adenoma of colon 08/17/2023    Unspecified kidney failure 08/17/2016    Renal failure       Surgical History:    Past Surgical History:   Procedure Laterality Date    ILEOSTOMY  04/25/2017    Ileostomy    ILEOSTOMY CLOSURE  08/17/2015    Ileostomy Closure    OTHER SURGICAL HISTORY  04/21/2017    Right Hemicolectomy    OTHER SURGICAL HISTORY  08/17/2015    Arteriovenous Surgery Creation Of A-V Fistula    OTHER SURGICAL HISTORY  08/17/2015    Sigmoidoscopy (Fiberoptic, Therapeutic )    PROSTATECTOMY  10/11/2013    Prostatectomy Radical    TRANSPLANT, KIDNEY, OPEN  1992    TRANSPLANT, KIDNEY, OPEN  2013    US GUIDED PERCUTANEOUS BIOPSY RENAL LEFT Left 11/20/2023    US GUIDED PERCUTANEOUS BIOPSY RENAL LEFT 11/20/2023 Lou Rodgers MD [place] [facility]    US GUIDED PERCUTANEOUS PERITONEAL OR RETROPERITONEAL FLUID COLLECTION DRAINAGE  10/20/2022    US GUIDED PERCUTANEOUS PERITONEAL OR RETROPERITONEAL FLUID COLLECTION DRAINAGE 10/20/2022 [facility]       Allergies:  No Known Allergies  Patient has no known allergies.    Social History  Living Situation: Lives in apartment with daughter  Occupation: Retired  Functional Status: Independent  Alcohol:   Alcohol Use: Not At Risk (1/5/2025)    AUDIT-C     Frequency of Alcohol Consumption: Never     Average Number of Drinks: Patient does not drink     Frequency of Binge Drinking: Never   Prior alcohol use: 1 bottle of varying alcohols on weekends, quit 8 months ago    Tobacco:   Tobacco Use: Low Risk  (1/5/2025)    Patient History     Smoking Tobacco Use: Never     Smokeless Tobacco Use: Never     Passive Exposure: Past      Illicit Drugs:   Social History     Substance and Sexual Activity   Drug Use Not on file       Family History:    Family History   Problem Relation Name Age of Onset    Bone cancer Mother      Other (corona's  "sarcome of the bone marrow) Mother      Prostate cancer Father      Diabetes Other Family Hist     Hypertension Other Family Hist        Home Medications  Current Outpatient Medications   Medication Instructions    acetaminophen (TYLENOL) 975 mg, oral, Every 6 hours PRN    calcitriol (ROCALTROL) 0.5 mcg, oral, Daily    carvedilol (COREG) 37.5 mg, oral, 2 times daily, PATIENT NEEDS TO FOLLOW UP WITH CARDIOLOGY    cinacalcet (SENSIPAR) 30 mg, oral, Daily    Easy Touch Alcohol Prep Pads pads, medicated     epoetin aida 10,000 unit/mL injection Inject 1 mL (10,000 Units) under the skin every 7 days. Do not start before April 17, 2024.    Gvoke HypoPen 1-Pack 1 mg, intramuscular, Every 15 min PRN    imiquimod (Aldara) 5 % cream 1 packet, Topical, 3 times weekly    insulin glargine (LANTUS) 20 Units, subcutaneous, Every morning, Inject 20 units daily as directed    insulin lispro (HUMALOG KWIKPEN INSULIN) 3 Units, subcutaneous, 3 times daily (morning, midday, late afternoon), 2 units with meals plus sliding scale up to 30 units daily    isosorbide mononitrate ER (IMDUR) 60 mg, oral, Daily    ketorolac (Acular) 0.5 % ophthalmic solution Instill 1 drop into right eye three times a day FOR USE AFTER CATARACT SURGERY    lancets (Unilet Super Thin Lancets) 30 gauge misc 3 times daily    lancing device misc use as directed    neomycin-polymyxin-dexAMETHasone (Maxitrol) 3.5mg/mL-10,000 unit/mL-0.1 % ophthalmic suspension Instill 1 drop into right eye three times a day FOR USE AFTER CATARACT SURGERY    NIFEdipine ER (ADALAT CC) 90 mg, oral, 2 times daily, Do not crush, chew, or split.    pantoprazole (ProtoNix) 40 mg EC tablet Take 1 tablet (40 mg) by mouth once daily in the morning. Take before meals. Do not crush, chew, or split. Do not start before January 22, 2024.    pen needle, diabetic (TechLITE Pen Needle) 32 gauge x 5/32\" needle Use 4 a day as directed    pravastatin (PRAVACHOL) 10 mg, oral, Nightly    predniSONE " "(Deltasone) 5 mg tablet Take 8 tablets (40 mg) by mouth once daily for 5 days, THEN 4 tablets (20 mg) once daily for 5 days, THEN 2 tablets (10 mg) once daily for 5 days, THEN 1 tablet (5 mg) once daily. Do not start before 2025.    sulfamethoxazole-trimethoprim (Bactrim DS) 800-160 mg tablet 1 tablet, oral, Daily    syringe with needle 3 mL 25 x 5/8\" syringe Use to inject epogen.    tacrolimus (PROGRAF) 0.5 mg, oral, 2 times daily    tacrolimus (Protopic) 0.1 % ointment Topical, 2 times daily    Unilet Super Thin Lancets 30 gauge misc 1 each, miscellaneous, 3 times daily    vitamin B complex-vitamin C-folic acid (Nephrocaps) 1 mg capsule 1 capsule, oral, Daily            Objective   OBJECTIVE     Vitals: I/O:   Vitals:    25 1300   BP: 131/68   Pulse: 70   Resp: (!) 22   Temp:    SpO2: (!) 93%        24hr Min/Max:  Temp  Min: 38.4 °C (101.1 °F)  Max: 38.4 °C (101.1 °F)  Pulse  Min: 68  Max: 84  BP  Min: 120/63  Max: 176/78  Resp  Min: 14  Max: 24  SpO2  Min: 88 %  Max: 97 %    Temperature:  [38.4 °C (101.1 °F)] 38.4 °C (101.1 °F)  Heart Rate:  [68-84] 70  Respirations:  [14-24] 22  BP: (120-176)/(63-78) 131/68    Temp (24hrs), Av.4 °C (101.1 °F), Min:38.4 °C (101.1 °F), Max:38.4 °C (101.1 °F)   No intake or output data in the 24 hours ending 25 145     Net IO Since Admission: No IO data has been entered for this period [25]      Physical Exam:  Constitutional: Patient does not appear to be in any acute distress, Aox4  HEENT: EOMI, R eye bloody over medial aspect of the sclera (reports stable since cataract surgery)  Cardio: RRR, S1/S2, 2/6 systolic murmur best heard over RUSB/LUSB  Pulm: Bilateral crackles, most prominently over lung bases, no wheezes, normal respiratory effort  GI: +BS, soft, non-tender, nondistended, no guarding or rebound, no masses noted  MSK: No joint swelling, old fistula sites noted over LUE  Skin: No lesions, contusions, or erythema.  Extremities: no " BLE swelling   Neuro: No focal deficits  Psych: Appropriate mood and behavior    Inpatient Medications:  Scheduled Medications  [START ON 1/27/2025] azithromycin, 500 mg, oral, q24h ZOË  brimonidine, 1 drop, Right Eye, BID  bumetanide, 4 mg, intravenous, Once  calcitriol, 0.5 mcg, oral, Daily  [Held by provider] carvedilol, 25 mg, oral, BID  cinacalcet, 30 mg, oral, Daily  heparin (porcine), 5,000 Units, subcutaneous, q8h ZOË  [START ON 1/27/2025] imiquimod, 1 packet, Topical, Once per day on Monday Wednesday Friday  [START ON 1/27/2025] insulin glargine, 15 Units, subcutaneous, q AM  insulin lispro, 0-10 Units, subcutaneous, TID AC  ketorolac, 1 drop, Right Eye, TID  neomycin-polymyxin-dexAMETHasone, 1 drop, Right Eye, q8h ZOË  [START ON 1/27/2025] NIFEdipine ER, 90 mg, oral, Daily before breakfast  [START ON 1/27/2025] pantoprazole, 40 mg, oral, Daily before breakfast  piperacillin-tazobactam, 2.25 g, intravenous, q8h  pravastatin, 10 mg, oral, Nightly  predniSONE, 5 mg, oral, Daily  sulfamethoxazole-trimethoprim, 1 tablet, oral, Daily  tacrolimus, 0.5 mg, oral, BID  vancomycin, 1,000 mg, intravenous, Once   Followed by  vancomycin, 750 mg, intravenous, Once  vitamin B complex-vitamin C-folic acid, 1 capsule, oral, Daily     Continuous Medications    PRN Medications  PRN medications: acetaminophen, dextrose, dextrose, glucagon, glucagon, polyethylene glycol, vancomycin     Labs:  CBC RFP   Lab Results   Component Value Date    WBC 4.9 01/26/2025    HGB 10.4 (L) 01/26/2025    HCT 32.1 (L) 01/26/2025    MCV 89 01/26/2025    PLT 57 (L) 01/26/2025    NEUTROABS 3.81 01/26/2025    Lab Results   Component Value Date     (L) 01/26/2025    K 5.2 01/26/2025    CL 96 (L) 01/26/2025    CO2 29 01/26/2025    BUN 30 (H) 01/26/2025    CREATININE 7.88 (H) 01/26/2025    CREATININE 8.31 (H) 01/08/2025     Lab Results   Component Value Date    MG 1.81 01/26/2025    PHOS 5.0 (H) 01/26/2025    CALCIUM 8.9 01/26/2025    ALBUMINELP  "3.9 10/14/2024         Hepatic Function ABG/VBG   Lab Results   Component Value Date    ALT 11 01/26/2025    AST 32 01/26/2025    ALKPHOS 70 01/26/2025     Lab Results   Component Value Date    BILIDIR 0.1 09/13/2024      Lab Results   Component Value Date    PROTIME 15.0 (H) 09/11/2024    APTT 32 09/11/2024    INR 1.3 (H) 09/11/2024    ALBUMINELP 3.9 10/14/2024    Lab Results   Component Value Date    LACTATE 0.7 08/26/2024        CBC  Results from last 7 days   Lab Units 01/26/25  0622   HEMOGLOBIN g/dL 10.4*   HEMATOCRIT % 32.1*   WBC AUTO x10*3/uL 4.9   PLATELETS AUTO x10*3/uL 57*      BMP  Results from last 7 days   Lab Units 01/26/25  0622   SODIUM mmol/L 135*   POTASSIUM mmol/L 5.2   CHLORIDE mmol/L 96*   CO2 mmol/L 29   BUN mg/dL 30*   CREATININE mg/dL 7.88*   MAGNESIUM mg/dL 1.81   PHOSPHORUS mg/dL 5.0*   ANION GAP mmol/L 15   GLUCOSE mg/dL 208*   CALCIUM mg/dL 8.9     LFTS  Results from last 7 days   Lab Units 01/26/25  0622   ALT U/L 11   AST U/L 32   ALK PHOS U/L 70   BILIRUBIN TOTAL mg/dL 0.6     COAGS        No lab exists for component: \"PT\"     Micro  No results found for the last 90 days.      Imaging:   XR chest 2 views    Result Date: 1/26/2025  1.  Mild central pulmonary vascular congestion with patchy perihilar/bibasilar airspace opacities. Recommend correlation with patient's volume status as findings could reflect interstitial edema. Superimposed infectious process is not definitively excluded. 2. Small bilateral pleural effusions.   I personally reviewed the images/study and I agree with the findings as stated by resident Jean Rowland. This study was interpreted at [facility], [place], [place].   MACRO: None   Signed by: James Prasad 1/26/2025 9:31 AM Dictation workstation:   [id number]    CT abdomen pelvis wo IV contrast    Result Date: 1/5/2025  1. Interval development of marked fat stranding adjacent to the left iliac fossa renal transplant which " appears edematous and heterogeneous, new compared to 09/10/2024 CT. No perinephric fluid collections. Findings concerning for urinary tract infection/pyelonephritis. Correlation with urinalysis is recommended. 2. Slight interval decrease in mild right pleural effusion with interval resolution of previously noted small left pleural effusion. Persistent bilateral ground-glass opacities with mild interlobular septal thickening, likely pulmonary edema. 3. Mild splenomegaly measuring up to 13.5 cm in craniocaudal dimension, which has improved compared to 14.5 cm on 09/10/2024 CT. 4. Additional findings as described above.   I personally reviewed the images/study and I agree with the findings as stated by Kendrick Purdy MD. This study was interpreted at [facility], [place], [place].   MACRO: None.   Signed by: Hue Aguilar 1/5/2025 2:17 PM Dictation workstation:   [id number]    === 09/10/24 ===    US THORACENTESIS    - Impression -  Uneventful thoracentesis, as detailed above. Right Pleural space, 750  mL    I personally performed and/or directly supervised this study and was  present for the entire procedure.    Performed and dictated at [facility].    Signed by: Aamir Thacker 9/12/2024 4:23 PM  Dictation workstation:   [id number]  === 01/05/25 ===    CT ABDOMEN PELVIS WO IV CONTRAST    - Impression -  1. Interval development of marked fat stranding adjacent to the left  iliac fossa renal transplant which appears edematous and  heterogeneous, new compared to 09/10/2024 CT. No perinephric fluid  collections. Findings concerning for urinary tract  infection/pyelonephritis. Correlation with urinalysis is recommended.  2. Slight interval decrease in mild right pleural effusion with  interval resolution of previously noted small left pleural effusion.  Persistent bilateral ground-glass opacities with mild interlobular  septal thickening, likely  pulmonary edema.  3. Mild splenomegaly measuring up to 13.5 cm in craniocaudal  dimension, which has improved compared to 14.5 cm on 09/10/2024 CT.  4. Additional findings as described above.    I personally reviewed the images/study and I agree with the findings  as stated by Kendrick Purdy MD. This study was interpreted at  [facility], [place], [place].    MACRO:  None.    Signed by: Hue Aguilar 1/5/2025 2:17 PM  Dictation workstation:   [id number]  === 04/30/24 ===    MR CARDIAC W AND WO CONTRAST WITH REGADENOSON STRESS FOR MORPH/FUNCT AND VALVE DZ    - Impression -  1. The left ventricle is dilated (EDVi-147 ml/m2) and has overall low  normal global systolic function(LVEF-54 %).  There are no major wall  motion abnormalities.  2. Normal myocardial perfusion at rest and post stress without  evidence of inducible ischemia.  3. Linear mid myocardial delayed enhancement within the basal  inferolateral wall in a nonischemic pattern. Questionable thin mid  myocardial delayed enhancement within the basal septum on the short  axis images is less conspicuous on the other imaging planes and is  favored to be artifactual, although a small area of nonischemic  scarring could appear similar.  4. Circumferential left ventricular hypertrophy, most prominent in  the base.  5. Mild qualitative mitral regurgitation. Regurgitant fraction = 3%.  6. Biatrial dilatation.  7. Moderate right and small left pleural effusion, similar to recent  CT chest 04/03/2024. Areas of signal abnormality within the lung  fields may represent edema or infection.  8. Borderline dilated main pulmonary artery which can be seen with  pulmonary hypertension.    Signed by: Luis Collado 4/30/2024 4:55 PM  Dictation workstation:   [id number]         Assessment/Plan   ASSESSMENT / PLAN     Manolo Bashir is a 69 y.o. male with a PMH of ESRD (on dialysis, TThSa schedule, most recent dialysis 1/25), c/b impaired allograft  function currently on immunosuppression (prednisone, tacrolimus), IgG and IgA lambda MGUS (BM biopsy 10/23 without evidence of myeloma), T2DM (on 20 units Lantus in AM, Lispro 3 units TID + SSI), HTN, prostate cancer (s/p radical prostatectomy 10/2013), HCV (s/p treatment, PCR negative 10/2023), and gastroparesis who presents for 1 day history of SOB, nausea/vomiting, diarrhea, and general malaise that started during dialysis on 1/25.    Patient was febrile to 38.4 on presentation and hypoxic to 88% on RA. ED work up was remarkable for elevated troponins (65 -> 87), elevated BNP (2,810), hyperphosphatemia to 5.0, and thrombocytopenia to 57. Imaging was remarkable for bilateral pulmonary vascular congestion consistent with pulmonary edema, no overt evidence of consolidation however difficult to assess in the context of edema. ECG unchanged from prior studies. Given the patient's history of ESRD, elevated BNP, and evidence of pulmonary edema, respiratory symptoms may be 2/2 fluid overload from ESRD. However, patient has not missed any dialysis appointments recently and does not have LE edema, so considering other etiologies as well. Patient does not have history of heart failure, however does have a history of valvular dysfunction (mild-mod aortic valve regurgitation), LV dilation and hypertrophy, and LA/RA dilation. Given this, systolic murmur findings, and pulmonary edema with elevated BNP/troponins, cannot rule out cardiac etiology. Additionally, the patient did present with fever, chills, and nausea/vomiting/diarrhea, concerning for potential infectious etiology, likely gastroenteritis vs. Pneumonia, especially given increased risk in a dialysis patient.    #ESRD on dialysis  #Hx of two failed kidney transplants c/b chronic rejection  #Chronic immunosuppression  #Elevated BNP  #AHRF 2/2 pulmonary edema vs. Pneumonia  - COVID/Flu/RSV negative  - QTc on admission 450 ms  PLAN  - Transplant nephrology consulted,  appreciate recommendations   - 4 mg IV Bumex ordered 1/26   - Will attempt to additional dialysis session 1/26  - Adenovirus, Rhinovirus, Parainfluenza, Metapneumovirus PCR ordered  - Procalcitonin ordered  - Repeat troponin ordered  - MRSA nares ordered  - Continue azithromycin 500 mg daily  - Start vancomycin, dosing per pharmacy  - Start Zosyn 2.25 g IV q8h  - TTE ordered 1/26  - Continue prednisone 5 mg daily  - Continue tacrolimus 0.5 mg BID  - Continue home Bactrim -160 mg daily  - Tacrolimus level ordered  - CTM daily RFP, CBC  - Supplemental O2 PRN, goal SpO2 > 92%  - Strict I/Os  - Daily standing weights as tolerated    #Gastroparesis  #Nausea and vomiting  #Diarrhea  #c/f gastroenteritis  - Stool O/P ordered  - Giardia, cryptosporidium antigen ordered  - C. Diff PCR ordered  - Consider restarting metoclopramide  - CTM stool output and abdominal exams    #IgG and IgA lambda MGUS  #Thrombocytopenia  - Thrombocytopenia chronic, however baseline ~90s, may be decreased in the setting of viral infection vs. Immunosuppression vs. MGUS  PLAN  - CTM on daily CBC  - Continue home cinacalcet 30 mg daily  - Continue home calcitriol 0.5 mcg daily    #Hx of recent cataract surgery  - Continue home eye drops:   - Maxitrol 1 drop R eye q8h   - brimonidine 2% solution 1 drop R eye BID   - ketorolac 0.5% solution 1 drop R eye q8h    #HTN  - Continue home nifedipine 90 mg daily  - Hold home carvedilol 25 mg BID pending blood pressure trend  - Hold home Imdur 60 mg daily  - CTM, may restart home medications if becomes hypertensive    #T2DM  - Most recent A1c 9.1 12/16/24  PLAN  - Start Lantus 15 units in AM + #2 SSI    #HLD  - Continue home pravastatin 10 mg daily    #GERD  - Continue home pantoprazole 40 mg daily    F: Replete PRN  E: Replete PRN  N: Adult diet Regular   A: PIV / Cedeño (date placed)    Oxygen: None  Abx: azithromycin - 500 mg  neomycin-polymyxin-dexAMETHasone - 3.5mg/mL-10,000 unit/mL-0.1 %,  3.5mg/mL-10,000 unit/mL-0.1 %  piperacillin-tazobactam - 2.25 gram/50 mL  sulfamethoxazole-trimethoprim - 800-160 mg, 800-160 mg  vancomycin - 1 gram/200 mL, 750 mg/150 mL     DVT Ppx: Heparin SubQ  GI Ppx: Pantoprazole  GI Laxative: Miralax PRN    Code Status: Full (confirmed on admission)  Surrogate Medical Decision-maker:  Amalia Enriquez ([phone])  Disposition: Admit to general medicine     Renay Cheung, MS4  Please Haiku with any questions or concerns.

## 2025-01-26 NOTE — SIGNIFICANT EVENT
"Senior Staffing Note   HPI  Manolo Bashir is a 69 y.o. male with ESRD (on dialysis; [facility] schedule; s/p 2x renal transplant - 1992, 2013), now with impaired allograft function currently on immunosuppression (prednisone, tacrolimus), IgG and IgA lambda MGUS (recent hematologic eval including Bmbx with no e/o myeloma), T2DM, HTN, prostate cancer (s/p radical prostatectomy), HCV (s/p rx; PCR neg 10/2023), and gastroparesis admitted for further work up of dyspnea, fever, and N/V/D.     Pt states that he was at HD on 1/25 when he started to experience chills. He is unsure if he had a temperature. He notes that when he arrived home from HD, he experience nausea and NBNB emesis. He is also endorsing diarrhea which started on 1/25 as well. He has had 5-6 episodes of loose stools with the last one being 1/26 around 12pm. He reports a dry cough and orthopnea since last evening as well. No recent sick contacts.     Objective   Vital signs:  Blood pressure 131/68, pulse 70, temperature (!) 38.4 °C (101.1 °F), temperature source Tympanic, resp. rate (!) 22, height 1.727 m (5' 8\"), weight 72.6 kg (160 lb), SpO2 (!) 93%.    Physical Exam   Constitutional: Alert and oriented, NAD  HEENT: NC/AT, no scleral icterus, throat non-erythematous and without lesions  Cardiovascular: RRR, systolic ejection murmur noted   Respiratory: crackles in bilateral bases  Gastrointestinal: abdomen soft, no TTP, bowel sounds normoactive  Skin: warm, well-perfused, no rashes or lesions; LUE AVF without surrounding erythema or drainage   Extremities: no lower extremity edema   Neuro: no focal deficits  Psych: appropriate affect       Results from last 7 days   Lab Units 01/26/25  0622   WBC AUTO x10*3/uL 4.9   HEMOGLOBIN g/dL 10.4*   HEMATOCRIT % 32.1*   PLATELETS AUTO x10*3/uL 57*            Results from last 7 days   Lab Units 01/26/25  0622   SODIUM mmol/L 135*   POTASSIUM mmol/L 5.2   CHLORIDE mmol/L 96*   CO2 mmol/L 29   BUN mg/dL 30*   CREATININE " mg/dL 7.88*   CALCIUM mg/dL 8.9     Results from last 7 days   Lab Units 01/26/25  0622   ALK PHOS U/L 70   BILIRUBIN TOTAL mg/dL 0.6   PROTEIN TOTAL g/dL 6.9   ALT U/L 11   AST U/L 32               Assessment/Plan   Manolo Bashir is a 69 y.o. male with ESRD (on dialysis; [facility] schedule; s/p 2x renal transplant - 1992, 2013), now with impaired allograft function currently on immunosuppression (prednisone, tacrolimus), IgG and IgA lambda MGUS (recent hematologic eval including Bmbx with no e/o myeloma), T2DM, HTN, prostate cancer (s/p radical prostatectomy), HCV (s/p rx; PCR neg 10/2023), and gastroparesis admitted for further work up of dyspnea, fever, and N/V/D.  Pt HDS with /78, HR 84, initially 88% on RA. Febrile to 38.4. CXR with pulmonary vascular congestion with patchy airspace opacities and small bilateral pleural effusions. ECG without evidence of ischemia. Labs s/f trop 65 -> 87, BNP 2810 Pt given CTX and azithromycin in the ED. Transplant nephrology was contacted who recommended giving 4mg IV bumex and while a chair in HD was waiting to be made available. Ddx includes CAP vs nosocomial PNA (recently admitted 1/5-1/8) vs gastroenteritis vs less likely bacteremia (iso accessing AVF for HD). Blood cx obtained, pending. Will broaden abx coverage with vancomycin and zosyn while blood cx are pending. Will obtain procalcitonin. Pt anuric so urine PNA antigens unable to be collected. Transplant nephrology contacted and following - will try to get HD done today if possible.       F: Replete PRN  E: Replete PRN  N: Regular Diet  DVT Ppx: Heparin SubQ   GI Ppx: Pantoprazole   Access/Lines: PIV   Abx: Vanc/Zosyn/azithromycin   O2: None       Code status: Full Code  Medical Decision Maker: Amalia (friend) [phone]       Patient to be staffed in AM with attending.     Please see MS4 H&P for full details.     Edward Herman MD   Internal Medicine, PGY-3  [facility]

## 2025-01-26 NOTE — ED PROVIDER NOTES
History of Present Illness     History provided by: Patient  Limitations to History: None  External Records Reviewed with Brief Summary: Discharge Summary from 2025 which showed visit for chronic rejection.    HPI:  Manolo Bashir is a 69 y.o. 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 Patient was found to be having worsening issues breathing. Patient believes these fluid overloaded. He does report a full dialysis treatment yesterday. He is on room air at home but was placed on 3 L in triage. Patient was also noted to have a persistent cough. He reports is been going on since yesterday. Reports that started while he was sleeping. Denies abdominal pain. No lower extremity edema.    Physical Exam   Triage vitals:  T (!) 38.4 °C (101.1 °F)  HR 84  /78  RR 18  O2 (!) 88 % None (Room air)    General: Awake, alert, in no acute distress  Eyes: Gaze conjugate.  No scleral icterus or injection  HENT: Normo-cephalic, atraumatic. No stridor  CV: Regular rate, regular rhythm. Radial pulses 2+ bilaterally  Resp: Breathing non-labored, speaking in full sentences.  crackles to auscultation bilaterally  GI: Soft, non-distended, non-tender. No rebound or guarding.  MSK/Extremities: No gross bony deformities. Moving all extremities  Skin: Warm. Appropriate color  Neuro: Alert. Oriented. Face symmetric. Speech is fluent.  Gross strength and sensation intact in b/l UE and LEs  Psych: Appropriate mood and affect      Medical Decision Making & ED Course   Medical Decision Makin y.o. male  with a past medical history of a failed kidney transplant times two on dialysis (THS), type II diabetes, hypertension, hep C, prostate cancer s/p prostatectomy who is presenting with chest pain and shortness of breath. Vital signs showed a hypoxia and  "patient was placed on 3 L NC.   ----  {Scoring Tools Utilized:46084}    Differential diagnoses considered include but are not limited to: Please see MDM.     Social Determinants of Health which Significantly Impact Care: {Social Determinants of Health which Significantly Impact Care:31960::\"None identified\"} {The following actions were taken to address these social determinants:85623}    EKG Independent Interpretation: EKG interpreted by myself. Please see ED Course and MDM for full interpretation.    Independent Result Review and Interpretation: Results were independently reviewed and interpreted by myself. Please see ED course and MDM for full interpretation.    Chronic conditions affecting the patient's care: As documented in the MDM    The patient was discussed with the following consultants/services: {The patient was discussed with the following consultants/services:80367::\"None\"}    Care Considerations: As per MDM    ED Course:     Disposition   {ED Disposition:66059}    Procedures   Procedures    Patient seen and discussed with ED attending physician.    Lucille Ohara MD  Emergency Medicine  " status as findings could reflect interstitial edema.  Superimposed infectious process is not definitively excluded.  2. Small bilateral pleural effusions.   [SA]   0959 Patient on 4L O2 [SA]   1007 Repeat EKG stable [SA]   1007 Viral swabs negative, pending transplant nephrology eval, empiric antibiotics initiated with rocephin/azithromycin for possible PNA in the setting of new O2 and fever, discussed with admission coordinators [SA]      ED Course User Index  [SA] Crystal Shankar DO         Diagnoses as of 01/27/25 2109   Shortness of breath   Pneumonia due to infectious organism, unspecified laterality, unspecified part of lung     Disposition   Patient was signed out to Dr. Shankar at 7 AM pending completion of their work-up.  Please see the next provider's transition of care note for the remainder of the patient's care.     Procedures   Procedures    Patient seen and discussed with ED attending physician.    Lucille Ohara MD  Emergency Medicine     Lucille Ohara MD  Resident  01/27/25 2111

## 2025-01-26 NOTE — HOSPITAL COURSE
Manolo Bashir is a 69 y.o. male with a PMH of ESRD (on dialysis, [facility] schedule, most recent dialysis 1/25), c/b impaired allograft function currently on immunosuppression (prednisone, tacrolimus), IgG and IgA lambda MGUS (BM biopsy 10/23 without evidence of myeloma), T2DM (on 20 units Lantus in AM, Lispro 3 units TID + SSI), HTN, prostate cancer (s/p radical prostatectomy 10/2013), HCV (s/p treatment, PCR negative 10/2023), and gastroparesis who presented to the ED on 1/26 for 1 day history of SOB, nausea/vomiting, diarrhea, and general malaise that started during dialysis on 1/25. The patient reported that he had not missed any doses of medication and had not missed any dialysis sessions.     Patient was febrile to 38.4 on presentation and hypoxic to 88% on RA. He was placed on supplemental oxygen via nasal cannula on arrival. ED work up was remarkable for elevated troponins (65 -> 87), elevated BNP (2,810), hyperphosphatemia to 5.0, and thrombocytopenia to 57. Imaging was remarkable for bilateral pulmonary vascular congestion consistent with pulmonary edema, no overt evidence of consolidation however it was difficult to assess in the context of edema. ECG unchanged from prior studies. Given the patient's history of ESRD, elevated BNP, and evidence of pulmonary edema, respiratory symptoms were potentially 2/2 fluid overload from ESRD. However, patient had not missed any dialysis appointments recently and did not have LE edema, so other etiologies were considered. Patient does not have history of heart failure, however does have a history of valvular dysfunction (mild-mod aortic valve regurgitation), LV dilation and hypertrophy, and LA/RA dilation. Given this, systolic murmur findings, and pulmonary edema with elevated BNP/troponins, cardiac etiologies could not be ruled out. Additionally, the patient did present with fever, chills, and nausea/vomiting/diarrhea, concerning for potential infectious etiology, likely  gastroenteritis vs. Pneumonia, especially given increased risk for infection in dialysis patients.    The patient was seen by transplant nephrology who recommended an additional session of dialysis when available due to concern for fluid overload. Viral URI panel was negative and stool studies were ordered. His immunosuppressant agents were continued and pneumonia coverage was started with vanc/zosyn/azithromycin due to immunocompromised status. A TTE was ordered to rule out cardiac etiologies of pulmonary edema/elevated BNP which showed ***.   improvement.    After initiation of the prednisone taper, the patient's abdominal pain significantly improved. His diarrhea also improved with oral vancomycin and his respiratory symptoms continued to improve. Per transplant surgery, the patient was okay for discharge on 2/3 with plans to follow up outpatient for nephrectomy. His insulin regimen was adjusted to account for prednisone administration daily.

## 2025-01-26 NOTE — PROGRESS NOTES
I received this patient during signout at 0700.   Please see previous provider's note for detailed H&P, labs and imaging.      Under my care, patient reassessed and remains clinically stable. See ED course below.    @  ED Course as of 01/26/25 1619   Sun Jan 26, 2025   0734 I assumed care of this patient at 7 AM.  CMP with potassium 5.2, sodium 135, creatinine is 7.88, BUN 30, BNP is elevated from prior at 2810, initial troponin is elevated at 65, delta troponin pending, phosphorus is elevated at 5.  Nonischemic EKG. Patient does have a new 3L O2 requirement. Lactate wnl. Given signs of fluid overload, initiated discussion for additional dialysis session with Valerie Morales RN [SA]   0821 Transplant nephrology to evaluate patient for additional dialysis session [SA]   0959 Troponin uptrending to 87 [SA]   0959 CXR reviewed:  IMPRESSION:  1.  Mild central pulmonary vascular congestion with patchy  perihilar/bibasilar airspace opacities. Recommend correlation with  patient's volume status as findings could reflect interstitial edema.  Superimposed infectious process is not definitively excluded.  2. Small bilateral pleural effusions.   [SA]   0959 Patient on 4L O2 [SA]   1007 Repeat EKG stable [SA]   1007 Viral swabs negative, pending transplant nephrology eval, empiric antibiotics initiated with rocephin/azithromycin for possible PNA in the setting of new O2 and fever, discussed with admission coordinators [SA]      ED Course User Index  [SA] Crystal Shankar DO         Diagnoses as of 01/26/25 1619   Shortness of breath   Pneumonia due to infectious organism, unspecified laterality, unspecified part of lung   @    Disposition: Admitted        Patient seen and staffed with attending physician.     Crystal Shankar DO   EM PGY3

## 2025-01-26 NOTE — ED TRIAGE NOTES
Pt to ED c/o shortness of breath & chest pain earlier which has since subsided. Pt had full dialysis treatment yesterday but is still feeling fluid overloaded. Pt missed his HTN medication last night. Pt placed on 3L - nasal cannula, does not wear oxygen at home - 88% on room air.

## 2025-01-27 ENCOUNTER — DOCUMENTATION (OUTPATIENT)
Dept: TRANSPLANT | Facility: HOSPITAL | Age: 70
End: 2025-01-27
Payer: MEDICARE

## 2025-01-27 ENCOUNTER — APPOINTMENT (OUTPATIENT)
Dept: DIALYSIS | Facility: HOSPITAL | Age: 70
End: 2025-01-27
Payer: MEDICARE

## 2025-01-27 ENCOUNTER — APPOINTMENT (OUTPATIENT)
Dept: CARDIOLOGY | Facility: HOSPITAL | Age: 70
DRG: 640 | End: 2025-01-27
Payer: MEDICARE

## 2025-01-27 LAB
ALBUMIN SERPL BCP-MCNC: 2.9 G/DL (ref 3.4–5)
ANION GAP SERPL CALC-SCNC: 14 MMOL/L (ref 10–20)
BASOPHILS # BLD AUTO: 0.02 X10*3/UL (ref 0–0.1)
BASOPHILS NFR BLD AUTO: 0.6 %
BUN SERPL-MCNC: 44 MG/DL (ref 6–23)
C DIF TOX TCDA+TCDB STL QL NAA+PROBE: DETECTED
CALCIUM SERPL-MCNC: 8.4 MG/DL (ref 8.6–10.6)
CHLORIDE SERPL-SCNC: 98 MMOL/L (ref 98–107)
CO2 SERPL-SCNC: 29 MMOL/L (ref 21–32)
CREAT SERPL-MCNC: 11.07 MG/DL (ref 0.5–1.3)
EGFRCR SERPLBLD CKD-EPI 2021: 5 ML/MIN/1.73M*2
EOSINOPHIL # BLD AUTO: 0.11 X10*3/UL (ref 0–0.7)
EOSINOPHIL NFR BLD AUTO: 3.1 %
ERYTHROCYTE [DISTWIDTH] IN BLOOD BY AUTOMATED COUNT: 18 % (ref 11.5–14.5)
GLUCOSE BLD MANUAL STRIP-MCNC: 102 MG/DL (ref 74–99)
GLUCOSE BLD MANUAL STRIP-MCNC: 121 MG/DL (ref 74–99)
GLUCOSE BLD MANUAL STRIP-MCNC: 77 MG/DL (ref 74–99)
GLUCOSE SERPL-MCNC: 77 MG/DL (ref 74–99)
HCT VFR BLD AUTO: 27.1 % (ref 41–52)
HGB BLD-MCNC: 8.9 G/DL (ref 13.5–17.5)
IMM GRANULOCYTES # BLD AUTO: 0 X10*3/UL (ref 0–0.7)
IMM GRANULOCYTES NFR BLD AUTO: 0 % (ref 0–0.9)
LYMPHOCYTES # BLD AUTO: 0.53 X10*3/UL (ref 1.2–4.8)
LYMPHOCYTES NFR BLD AUTO: 14.9 %
MAGNESIUM SERPL-MCNC: 1.94 MG/DL (ref 1.6–2.4)
MCH RBC QN AUTO: 29.1 PG (ref 26–34)
MCHC RBC AUTO-ENTMCNC: 32.8 G/DL (ref 32–36)
MCV RBC AUTO: 89 FL (ref 80–100)
MONOCYTES # BLD AUTO: 0.41 X10*3/UL (ref 0.1–1)
MONOCYTES NFR BLD AUTO: 11.5 %
NEUTROPHILS # BLD AUTO: 2.48 X10*3/UL (ref 1.2–7.7)
NEUTROPHILS NFR BLD AUTO: 69.9 %
NRBC BLD-RTO: 0 /100 WBCS (ref 0–0)
PHOSPHATE SERPL-MCNC: 6.7 MG/DL (ref 2.5–4.9)
PLATELET # BLD AUTO: 55 X10*3/UL (ref 150–450)
POTASSIUM SERPL-SCNC: 5.2 MMOL/L (ref 3.5–5.3)
PROCALCITONIN SERPL-MCNC: 0.95 NG/ML
RBC # BLD AUTO: 3.06 X10*6/UL (ref 4.5–5.9)
SODIUM SERPL-SCNC: 136 MMOL/L (ref 136–145)
TACROLIMUS BLD-MCNC: <2 NG/ML
WBC # BLD AUTO: 3.6 X10*3/UL (ref 4.4–11.3)

## 2025-01-27 PROCEDURE — 99223 1ST HOSP IP/OBS HIGH 75: CPT | Performed by: INTERNAL MEDICINE

## 2025-01-27 PROCEDURE — 80197 ASSAY OF TACROLIMUS: CPT

## 2025-01-27 PROCEDURE — 2500000002 HC RX 250 W HCPCS SELF ADMINISTERED DRUGS (ALT 637 FOR MEDICARE OP, ALT 636 FOR OP/ED)

## 2025-01-27 PROCEDURE — 2500000001 HC RX 250 WO HCPCS SELF ADMINISTERED DRUGS (ALT 637 FOR MEDICARE OP)

## 2025-01-27 PROCEDURE — 2500000005 HC RX 250 GENERAL PHARMACY W/O HCPCS

## 2025-01-27 PROCEDURE — 36415 COLL VENOUS BLD VENIPUNCTURE: CPT

## 2025-01-27 PROCEDURE — 2500000004 HC RX 250 GENERAL PHARMACY W/ HCPCS (ALT 636 FOR OP/ED)

## 2025-01-27 PROCEDURE — 82947 ASSAY GLUCOSE BLOOD QUANT: CPT

## 2025-01-27 PROCEDURE — 87493 C DIFF AMPLIFIED PROBE: CPT

## 2025-01-27 PROCEDURE — 87329 GIARDIA AG IA: CPT

## 2025-01-27 PROCEDURE — 87506 IADNA-DNA/RNA PROBE TQ 6-11: CPT

## 2025-01-27 PROCEDURE — 1100000001 HC PRIVATE ROOM DAILY

## 2025-01-27 PROCEDURE — 5A1D70Z PERFORMANCE OF URINARY FILTRATION, INTERMITTENT, LESS THAN 6 HOURS PER DAY: ICD-10-PCS | Performed by: INTERNAL MEDICINE

## 2025-01-27 PROCEDURE — 83735 ASSAY OF MAGNESIUM: CPT

## 2025-01-27 PROCEDURE — 87324 CLOSTRIDIUM AG IA: CPT

## 2025-01-27 PROCEDURE — 90935 HEMODIALYSIS ONE EVALUATION: CPT | Performed by: INTERNAL MEDICINE

## 2025-01-27 PROCEDURE — 85025 COMPLETE CBC W/AUTO DIFF WBC: CPT

## 2025-01-27 PROCEDURE — 8010000001 HC DIALYSIS - HEMODIALYSIS PER DAY

## 2025-01-27 PROCEDURE — 80069 RENAL FUNCTION PANEL: CPT

## 2025-01-27 PROCEDURE — 87328 CRYPTOSPORIDIUM AG IA: CPT

## 2025-01-27 PROCEDURE — 2500000004 HC RX 250 GENERAL PHARMACY W/ HCPCS (ALT 636 FOR OP/ED): Performed by: INTERNAL MEDICINE

## 2025-01-27 RX ORDER — CARVEDILOL 25 MG/1
25 TABLET ORAL 2 TIMES DAILY
Status: DISCONTINUED | OUTPATIENT
Start: 2025-01-27 | End: 2025-01-28

## 2025-01-27 RX ORDER — PARICALCITOL 5 UG/ML
10 INJECTION, SOLUTION INTRAVENOUS 3 TIMES WEEKLY
Status: DISCONTINUED | OUTPATIENT
Start: 2025-01-27 | End: 2025-01-29

## 2025-01-27 RX ORDER — SEVELAMER CARBONATE 800 MG/1
800 TABLET, FILM COATED ORAL
Status: DISCONTINUED | OUTPATIENT
Start: 2025-01-27 | End: 2025-02-05 | Stop reason: HOSPADM

## 2025-01-27 RX ADMIN — AZITHROMYCIN DIHYDRATE 500 MG: 500 TABLET ORAL at 08:09

## 2025-01-27 RX ADMIN — KETOROLAC TROMETHAMINE 1 DROP: 5 SOLUTION OPHTHALMIC at 02:23

## 2025-01-27 RX ADMIN — KETOROLAC TROMETHAMINE 1 DROP: 5 SOLUTION OPHTHALMIC at 08:09

## 2025-01-27 RX ADMIN — BRIMONIDINE TARTRATE 1 DROP: 2 SOLUTION/ DROPS OPHTHALMIC at 08:09

## 2025-01-27 RX ADMIN — NEOMYCIN SULFATE, POLYMYXIN B SULFATE AND DEXAMETHASONE 1 DROP: 3.5; 10000; 1 SUSPENSION OPHTHALMIC at 08:09

## 2025-01-27 RX ADMIN — SEVELAMER CARBONATE 800 MG: 800 TABLET, FILM COATED ORAL at 15:10

## 2025-01-27 RX ADMIN — CINACALCET HYDROCHLORIDE 30 MG: 30 TABLET, FILM COATED ORAL at 08:09

## 2025-01-27 RX ADMIN — PRAVASTATIN SODIUM 10 MG: 10 TABLET ORAL at 22:02

## 2025-01-27 RX ADMIN — IMIQUIMOD 1 PACKET: 12.5 CREAM TOPICAL at 09:08

## 2025-01-27 RX ADMIN — TACROLIMUS 0.5 MG: 0.5 CAPSULE ORAL at 08:09

## 2025-01-27 RX ADMIN — PIPERACILLIN SODIUM AND TAZOBACTAM SODIUM 2.25 G: 2; .25 INJECTION, SOLUTION INTRAVENOUS at 02:40

## 2025-01-27 RX ADMIN — BRIMONIDINE TARTRATE 1 DROP: 2 SOLUTION/ DROPS OPHTHALMIC at 22:02

## 2025-01-27 RX ADMIN — NIFEDIPINE 90 MG: 90 TABLET, FILM COATED, EXTENDED RELEASE ORAL at 07:03

## 2025-01-27 RX ADMIN — PIPERACILLIN SODIUM AND TAZOBACTAM SODIUM 2.25 G: 2; .25 INJECTION, SOLUTION INTRAVENOUS at 18:47

## 2025-01-27 RX ADMIN — KETOROLAC TROMETHAMINE 1 DROP: 5 SOLUTION OPHTHALMIC at 22:02

## 2025-01-27 RX ADMIN — NEOMYCIN SULFATE, POLYMYXIN B SULFATE AND DEXAMETHASONE 1 DROP: 3.5; 10000; 1 SUSPENSION OPHTHALMIC at 17:40

## 2025-01-27 RX ADMIN — HEPARIN SODIUM 5000 UNITS: 5000 INJECTION, SOLUTION INTRAVENOUS; SUBCUTANEOUS at 06:51

## 2025-01-27 RX ADMIN — INSULIN GLARGINE 15 UNITS: 100 INJECTION, SOLUTION SUBCUTANEOUS at 08:09

## 2025-01-27 RX ADMIN — RENO CAPS 1 CAPSULE: 100; 1.5; 1.7; 20; 10; 1; 150; 5; 6 CAPSULE ORAL at 08:09

## 2025-01-27 RX ADMIN — HEPARIN SODIUM 5000 UNITS: 5000 INJECTION, SOLUTION INTRAVENOUS; SUBCUTANEOUS at 22:02

## 2025-01-27 RX ADMIN — SULFAMETHOXAZOLE AND TRIMETHOPRIM 1 TABLET: 800; 160 TABLET ORAL at 08:09

## 2025-01-27 RX ADMIN — CARVEDILOL 25 MG: 25 TABLET, FILM COATED ORAL at 22:02

## 2025-01-27 RX ADMIN — PANTOPRAZOLE SODIUM 40 MG: 40 TABLET, DELAYED RELEASE ORAL at 06:51

## 2025-01-27 RX ADMIN — TACROLIMUS 0.5 MG: 0.5 CAPSULE ORAL at 22:05

## 2025-01-27 RX ADMIN — PREDNISONE 5 MG: 5 TABLET ORAL at 08:09

## 2025-01-27 RX ADMIN — NEOMYCIN SULFATE, POLYMYXIN B SULFATE AND DEXAMETHASONE 1 DROP: 3.5; 10000; 1 SUSPENSION OPHTHALMIC at 02:23

## 2025-01-27 RX ADMIN — CALCITRIOL 0.5 MCG: 0.5 CAPSULE ORAL at 09:08

## 2025-01-27 SDOH — SOCIAL STABILITY: SOCIAL INSECURITY: DO YOU FEEL ANYONE HAS EXPLOITED OR TAKEN ADVANTAGE OF YOU FINANCIALLY OR OF YOUR PERSONAL PROPERTY?: NO

## 2025-01-27 SDOH — ECONOMIC STABILITY: FOOD INSECURITY: WITHIN THE PAST 12 MONTHS, THE FOOD YOU BOUGHT JUST DIDN'T LAST AND YOU DIDN'T HAVE MONEY TO GET MORE.: NEVER TRUE

## 2025-01-27 SDOH — SOCIAL STABILITY: SOCIAL INSECURITY: DO YOU FEEL UNSAFE GOING BACK TO THE PLACE WHERE YOU ARE LIVING?: NO

## 2025-01-27 SDOH — SOCIAL STABILITY: SOCIAL INSECURITY: ABUSE: ADULT

## 2025-01-27 SDOH — SOCIAL STABILITY: SOCIAL INSECURITY: WITHIN THE LAST YEAR, HAVE YOU BEEN AFRAID OF YOUR PARTNER OR EX-PARTNER?: NO

## 2025-01-27 SDOH — SOCIAL STABILITY: SOCIAL INSECURITY: ARE THERE ANY APPARENT SIGNS OF INJURIES/BEHAVIORS THAT COULD BE RELATED TO ABUSE/NEGLECT?: NO

## 2025-01-27 SDOH — ECONOMIC STABILITY: INCOME INSECURITY: IN THE PAST 12 MONTHS HAS THE ELECTRIC, GAS, OIL, OR WATER COMPANY THREATENED TO SHUT OFF SERVICES IN YOUR HOME?: NO

## 2025-01-27 SDOH — ECONOMIC STABILITY: FOOD INSECURITY: WITHIN THE PAST 12 MONTHS, YOU WORRIED THAT YOUR FOOD WOULD RUN OUT BEFORE YOU GOT THE MONEY TO BUY MORE.: NEVER TRUE

## 2025-01-27 SDOH — SOCIAL STABILITY: SOCIAL INSECURITY: WERE YOU ABLE TO COMPLETE ALL THE BEHAVIORAL HEALTH SCREENINGS?: YES

## 2025-01-27 SDOH — SOCIAL STABILITY: SOCIAL INSECURITY: WITHIN THE LAST YEAR, HAVE YOU BEEN HUMILIATED OR EMOTIONALLY ABUSED IN OTHER WAYS BY YOUR PARTNER OR EX-PARTNER?: NO

## 2025-01-27 SDOH — SOCIAL STABILITY: SOCIAL INSECURITY: DOES ANYONE TRY TO KEEP YOU FROM HAVING/CONTACTING OTHER FRIENDS OR DOING THINGS OUTSIDE YOUR HOME?: NO

## 2025-01-27 SDOH — SOCIAL STABILITY: SOCIAL INSECURITY: ARE YOU OR HAVE YOU BEEN THREATENED OR ABUSED PHYSICALLY, EMOTIONALLY, OR SEXUALLY BY ANYONE?: NO

## 2025-01-27 SDOH — SOCIAL STABILITY: SOCIAL INSECURITY: HAS ANYONE EVER THREATENED TO HURT YOUR FAMILY OR YOUR PETS?: NO

## 2025-01-27 SDOH — SOCIAL STABILITY: SOCIAL INSECURITY: HAVE YOU HAD ANY THOUGHTS OF HARMING ANYONE ELSE?: NO

## 2025-01-27 SDOH — SOCIAL STABILITY: SOCIAL INSECURITY: HAVE YOU HAD THOUGHTS OF HARMING ANYONE ELSE?: NO

## 2025-01-27 ASSESSMENT — COGNITIVE AND FUNCTIONAL STATUS - GENERAL
MOBILITY SCORE: 24
PATIENT BASELINE BEDBOUND: NO
DAILY ACTIVITIY SCORE: 24

## 2025-01-27 ASSESSMENT — LIFESTYLE VARIABLES
AUDIT-C TOTAL SCORE: 0
HOW OFTEN DO YOU HAVE 6 OR MORE DRINKS ON ONE OCCASION: NEVER
HOW OFTEN DO YOU HAVE A DRINK CONTAINING ALCOHOL: NEVER
AUDIT-C TOTAL SCORE: 0
HOW MANY STANDARD DRINKS CONTAINING ALCOHOL DO YOU HAVE ON A TYPICAL DAY: PATIENT DOES NOT DRINK
SKIP TO QUESTIONS 9-10: 1

## 2025-01-27 ASSESSMENT — ACTIVITIES OF DAILY LIVING (ADL)
HEARING - RIGHT EAR: FUNCTIONAL
BATHING: INDEPENDENT
TOILETING: INDEPENDENT
GROOMING: INDEPENDENT
LACK_OF_TRANSPORTATION: NO
FEEDING YOURSELF: INDEPENDENT
ADEQUATE_TO_COMPLETE_ADL: YES
DRESSING YOURSELF: INDEPENDENT
PATIENT'S MEMORY ADEQUATE TO SAFELY COMPLETE DAILY ACTIVITIES?: YES
HEARING - LEFT EAR: FUNCTIONAL
WALKS IN HOME: INDEPENDENT
JUDGMENT_ADEQUATE_SAFELY_COMPLETE_DAILY_ACTIVITIES: YES

## 2025-01-27 ASSESSMENT — PAIN SCALES - GENERAL
PAINLEVEL_OUTOF10: 0 - NO PAIN
PAINLEVEL_OUTOF10: 0 - NO PAIN

## 2025-01-27 ASSESSMENT — PAIN - FUNCTIONAL ASSESSMENT: PAIN_FUNCTIONAL_ASSESSMENT: NO/DENIES PAIN

## 2025-01-27 NOTE — PROCEDURES
"DIALYSIS NOTE:    No complaints today    Seen and examined during hemodialysis, undergoing treatment per submitted orders: 2 K, 2.5 Ca, 3.5  hours. Fluid removal 1 liter, as tolerated (keep SBP> 90mmHg).     /85 (BP Location: Right arm, Patient Position: Lying)   Pulse 78   Temp 37.1 °C (98.8 °F) (Skin)   Resp 16   Ht 1.727 m (5' 8\")   Wt 72.6 kg (160 lb)   SpO2 94%   BMI 24.33 kg/m²     Additional Recommendations:  We will start DARIANA  We will stop Calcitriol - on  parenteral Zemplar at outpatient unit.  Needs low phos diet and phosphate binder    "

## 2025-01-27 NOTE — PROGRESS NOTES
MEDICINE INPATIENT PROGRESS NOTE    Manolo Bashir is a 69 y.o. male on hospital day 1 who presented with Shortness of breath    Subjective   NAEO.  Patient seen this morning resting comfortably in bed. He reports improvement in his symptoms, states that he did not cough overnight and has not felt feverish or chills since he was admitted. States that his diarrhea has slowed down, only had one episode since coming to the ED. Also states that his nausea/vomiting is mostly in conjunction with coughing. Denies fever, chills, chest pain, dyspnea, abdominal pain, leg swelling, and dysuria. No additional questions or concerns at this time.       Objective     Last Recorded Vitals  Vitals:    01/27/25 1000 01/27/25 1100 01/27/25 1200 01/27/25 1259   BP: 173/86 177/88 167/85    BP Location:  Right arm Right arm    Patient Position:  Lying Lying    Pulse: 69 72 79 78   Resp: 18 14 16    Temp:    37.1 °C (98.8 °F)   TempSrc:    Skin   SpO2: (!) 93% (!) 93% 94%    Weight:       Height:           Intake/Output  No intake/output data recorded.    Physical Exam  Constitutional: Patient does not appear to be in any acute distress, Aox4  HEENT: EOMI, R eye bloody over medial aspect of the sclera (reports stable since cataract surgery)  Cardio: RRR, S1/S2, 2/6 systolic murmur best heard over RUSB/LUSB  Pulm: Very minimal crackles over bilateral bases, improved from yesterday, no wheezes, normal respiratory effort  GI: +BS, soft, non-tender, nondistended, no guarding or rebound, no masses noted  MSK: No joint swelling, multiple aneurysms and AV fistula noted on LUE  Skin: No lesions, contusions, or erythema.  Extremities: no BLE swelling   Neuro: No focal deficits  Psych: Appropriate mood and behavior    Labs    CBC  Results from last 7 days   Lab Units 01/27/25  0653 01/26/25  0622   HEMOGLOBIN g/dL 8.9* 10.4*   HEMATOCRIT % 27.1* 32.1*   WBC AUTO x10*3/uL 3.6* 4.9   PLATELETS AUTO x10*3/uL 55* 57*      BMP  Results from last 7  "days   Lab Units 01/27/25  0653 01/26/25  0622   SODIUM mmol/L 136 135*   POTASSIUM mmol/L 5.2 5.2   CHLORIDE mmol/L 98 96*   CO2 mmol/L 29 29   BUN mg/dL 44* 30*   CREATININE mg/dL 11.07* 7.88*   MAGNESIUM mg/dL 1.94 1.81   PHOSPHORUS mg/dL 6.7* 5.0*   ANION GAP mmol/L 14 15   GLUCOSE mg/dL 77 208*   CALCIUM mg/dL 8.4* 8.9     LFTS  Results from last 7 days   Lab Units 01/26/25  0622   ALT U/L 11   AST U/L 32   ALK PHOS U/L 70   BILIRUBIN TOTAL mg/dL 0.6     COAGS        No lab exists for component: \"PT\"     Micro  No results found for the last 90 days.       Imaging  XR chest 2 views    Result Date: 1/26/2025  1.  Mild central pulmonary vascular congestion with patchy perihilar/bibasilar airspace opacities. Recommend correlation with patient's volume status as findings could reflect interstitial edema. Superimposed infectious process is not definitively excluded. 2. Small bilateral pleural effusions.   I personally reviewed the images/study and I agree with the findings as stated by resident Jean Rowland. This study was interpreted at [facility], [place], [place].   MACRO: None   Signed by: James Prasad 1/26/2025 9:31 AM Dictation workstation:   [id number]    CT abdomen pelvis wo IV contrast    Result Date: 1/5/2025  1. Interval development of marked fat stranding adjacent to the left iliac fossa renal transplant which appears edematous and heterogeneous, new compared to 09/10/2024 CT. No perinephric fluid collections. Findings concerning for urinary tract infection/pyelonephritis. Correlation with urinalysis is recommended. 2. Slight interval decrease in mild right pleural effusion with interval resolution of previously noted small left pleural effusion. Persistent bilateral ground-glass opacities with mild interlobular septal thickening, likely pulmonary edema. 3. Mild splenomegaly measuring up to 13.5 cm in craniocaudal dimension, which has improved compared to 14.5 " cm on 09/10/2024 CT. 4. Additional findings as described above.   I personally reviewed the images/study and I agree with the findings as stated by Kendrick Purdy MD. This study was interpreted at [facility], [place], [place].   MACRO: None.   Signed by: Hue Aguilar 1/5/2025 2:17 PM Dictation workstation:   [id number]      Medications   Scheduled Medications  azithromycin, 500 mg, oral, q24h ZOË  brimonidine, 1 drop, Right Eye, BID  bumetanide, 4 mg, intravenous, Once  carvedilol, 25 mg, oral, BID  cinacalcet, 30 mg, oral, Daily  epoetin aida or biosimilar, 5,000 Units, intravenous, Once per day on Monday Wednesday Friday  heparin (porcine), 5,000 Units, subcutaneous, q8h ZOË  imiquimod, 1 packet, Topical, Once per day on Monday Wednesday Friday  insulin glargine, 15 Units, subcutaneous, q AM  insulin lispro, 0-10 Units, subcutaneous, TID AC  ketorolac, 1 drop, Right Eye, TID  neomycin-polymyxin-dexAMETHasone, 1 drop, Right Eye, q8h ZOË  NIFEdipine ER, 90 mg, oral, Daily before breakfast  pantoprazole, 40 mg, oral, Daily before breakfast  piperacillin-tazobactam, 2.25 g, intravenous, q8h  pravastatin, 10 mg, oral, Nightly  predniSONE, 5 mg, oral, Daily  sevelamer carbonate, 800 mg, oral, TID AC  sulfamethoxazole-trimethoprim, 1 tablet, oral, Daily  tacrolimus, 0.5 mg, oral, BID  vitamin B complex-vitamin C-folic acid, 1 capsule, oral, Daily     Continuous Medications    PRN Medications  PRN medications: acetaminophen, dextrose, dextrose, glucagon, glucagon, oxygen, polyethylene glycol          Assessment/Plan     Manolo Bashir is a 69 y.o. male with a PMH of ESRD (on dialysis, TThSa schedule, most recent dialysis 1/25), c/b impaired allograft function currently on immunosuppression (prednisone, tacrolimus), IgG and IgA lambda MGUS (BM biopsy 10/23 without evidence of myeloma), T2DM (on 20 units Lantus in AM, Lispro 3 units TID + SSI), HTN, prostate cancer (s/p radical  prostatectomy 10/2013), HCV (s/p treatment, PCR negative 10/2023), and gastroparesis who presents for 1 day history of SOB, nausea/vomiting, diarrhea, and general malaise that started during dialysis on 1/25. Presentation is concerning for fluid overload 2/2 ESRD, however cannot rule out cardiac etiologies of pulmonary edema. Also, given the patient's fever, history of immunocompromise, and elevated procalcitonin, there is concern for potential underlying pneumonia so he is being treated for nosocomial pneumonia. Additionally, given the patient's diarrhea, some concern for gastroenteritis, will follow up stool studies.    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 Pneumonia, improved  - Patient hypoxic to 88% on RA on admission, required 3L supplemental O2 briefly in ED, was weaned off  - CXR on admission with evidence of pulmonary vascular congestion, pulmonary edema and pleural effusions, cannot rule out potential infiltrate  - Patient febrile on admission to 38.4  - Hx of ESRD 2/2 HTN s/p 2 allographic kidney transplants c/b chronic rejection, dialysis TThSa, has not missed dialysis sessions  - Most recent TTE 4/4/24, EF 54%, dilated/hypertrophic left ventricle, dilated LA/RA, mild to moderate aortic regurgitation  - COVID/Flu/RSV negative, other viral URI studies negative so far  - QTc on admission 450 ms  -Procal elevated to 0.95  - Troponin trend 65 -> 87 -> 86  -MRSA nares negative 1/26  PLAN  - Transplant nephrology consulted, appreciate recommendations                - Additional dialysis scheduled for 1/27  - Continue prednisone 5 mg daily  - Continue  tacrolimus 0.5 mg BID  - Continue azithromycin 500 mg daily  - Continue Zosyn 2.25 g IV q8h  - Discontinue vancomycin  - TTE ordered 1/26, f/u  - Discontinue home Bactrim -160 mg daily, no longer on high-dose prednisone  - CTM daily RFP, CBC  - Supplemental O2 PRN, goal SpO2 > 92%  - Strict I/Os  - Daily standing weights as tolerated     #Gastroparesis  #Nausea and vomiting, improved  #Diarrhea, improving  #c/f gastroenteritis  - Stool O/P ordered, pending  - Giardia, cryptosporidium antigen ordered, pending  - C. Diff PCR ordered, pending  - Consider restarting metoclopramide if nausea/vomiting recur  - CTM stool output and abdominal exams     #IgG and IgA lambda MGUS  #Thrombocytopenia  - Thrombocytopenia chronic, however baseline ~90s, may be decreased in the setting of viral infection vs. Immunosuppression vs. MGUS  - 4T score 3, did receive heparin on prior admission 1/5-1/8 but low suspicion for HIT  PLAN  - CTM on daily CBC  - Continue home cinacalcet 30 mg daily  - Continue home calcitriol 0.5 mcg daily     #Hx of recent cataract surgery  - Continue home eye drops:                - Maxitrol 1 drop R eye q8h                - brimonidine 2% solution 1 drop R eye BID                - ketorolac 0.5% solution 1 drop R eye q8h     #HTN  - Continue home nifedipine 90 mg daily  - Hold home carvedilol 25 mg BID pending blood pressure trend  - Hold home Imdur 60 mg daily  - CTM, may restart home medications if becomes hypertensive     #T2DM  - Most recent A1c 9.1 12/16/24  PLAN  - Continue Lantus 15 units in AM + #2 SSI     #HLD  - Continue home pravastatin 10 mg daily     #GERD  - Continue home pantoprazole 40 mg daily     F: Replete PRN  E: Replete PRN  N: Adult diet Regular   A: PIV / Cedeño (date placed)     Oxygen: None  Abx: azithromycin - 500 mg  neomycin-polymyxin-dexAMETHasone - 3.5mg/mL-10,000 unit/mL-0.1 %, 3.5mg/mL-10,000 unit/mL-0.1 %  piperacillin-tazobactam - 2.25 gram/50  mL  sulfamethoxazole-trimethoprim - 800-160 mg, 800-160 mg  vancomycin - 1 gram/200 mL, 750 mg/150 mL     F: Replete PRN  E: Replete PRN  N: Adult diet Regular   A: PIV / Cedeño (date placed)    Oxygen: None / 2L NC  Abx: azithromycin - 500 mg  neomycin-polymyxin-dexAMETHasone - 3.5mg/mL-10,000 unit/mL-0.1 %, 3.5mg/mL-10,000 unit/mL-0.1 %  piperacillin-tazobactam - 2.25 gram/50 mL  sulfamethoxazole-trimethoprim - 800-160 mg, 800-160 mg     DVT Ppx: Heparin SubQ  GI Ppx: Pantoprazole  GI Laxative: Miralax     Code Status: full (confirmed on admission)  Surrogate Medical Decision-maker:  Amalia Enriquez ([phone])          Renay Cheung, MS4  Please Haiku with any questions or concerns.

## 2025-01-27 NOTE — CONSULTS
Reason For Consult  ESRD, h/o failed kidney txp    History Of Present Illness  Manolo Bashir is a 69 y.o. male presenting with ESRD attributed to hypertension since 1998 s/p kidney transplant #1 3/26/1992 that failed 11/13/2006), s/p kidney transplant #2 7/13/2013 which failed in May 2024 following which he has been on HD (TTS, [facility] via LUE AVG) . He also has known MGUS with IgG and IgA lambda (bone marrow bx 10/2023 with no plasma cell dyscrasia), DM2, HTN, prostate cancer s/p radical prostatectomy, baseline urinary incontinence, HCV s/p treatment (recent HCV PCR negative 7/2024).      Recent admission for graft intolerance that was treated with pulse steroids.     Last HD 1/25. Presented to ER last night with persistent shortness of breath, fever, n/v/d.  CXR with mild pulmonary congestion, possible pneumonia. Started on abx- ceftriaxone and azithromycin, vanc x1. blood cx drawn, neg so far. Resp viral swabs neg.   BNP elevated 2800. Troponin elevated as well but no EKG changes.  No leukocytosis.     Pt reports recent EDW 69 kg. Admission wt 72.6kg.  On O2 3LPM via NC. O2 sats stable 92% or higher.     ROS neg other than stated above.    Remains on tac 0.5 mg bid, pred 5mg/d.     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    Surgical History  He has a past surgical history that includes Prostatectomy (10/11/2013); Ileostomy (04/25/2017); Other surgical history (04/21/2017); Ileostomy closure (08/17/2015); Other surgical history (08/17/2015); Other surgical history (08/17/2015); US guided percutaneous peritoneal or retroperitoneal fluid collection drainage (10/20/2022); transplant, kidney, open (1992); transplant, kidney, open (2013); and US guided percutaneous biopsy renal left (Left, 11/20/2023).     Social History  He reports that he has never smoked. He has been exposed to tobacco smoke. He has never used smokeless tobacco.  Drug: Oxycodone. He reports that he does not drink alcohol.    Family History  Family History   Problem Relation Name Age of Onset    Bone cancer Mother      Other (corona's sarcome of the bone marrow) Mother      Prostate cancer Father      Diabetes Other Family Hist     Hypertension Other Family Hist         Allergies  Patient has no known allergies.    Scheduled medications  [START ON 1/27/2025] azithromycin, 500 mg, oral, q24h ZOË  brimonidine, 1 drop, Right Eye, BID  bumetanide, 4 mg, intravenous, Once  calcitriol, 0.5 mcg, oral, Daily  [Held by provider] carvedilol, 25 mg, oral, BID  cinacalcet, 30 mg, oral, Daily  heparin (porcine), 5,000 Units, subcutaneous, q8h ZOË  [START ON 1/27/2025] imiquimod, 1 packet, Topical, Once per day on Monday Wednesday Friday  [START ON 1/27/2025] insulin glargine, 15 Units, subcutaneous, q AM  insulin lispro, 0-10 Units, subcutaneous, TID AC  ketorolac, 1 drop, Right Eye, TID  neomycin-polymyxin-dexAMETHasone, 1 drop, Right Eye, q8h ZOË  [START ON 1/27/2025] NIFEdipine ER, 90 mg, oral, Daily before breakfast  [START ON 1/27/2025] pantoprazole, 40 mg, oral, Daily before breakfast  piperacillin-tazobactam, 2.25 g, intravenous, q8h  pravastatin, 10 mg, oral, Nightly  predniSONE, 5 mg, oral, Daily  sulfamethoxazole-trimethoprim, 1 tablet, oral,  "Daily  tacrolimus, 0.5 mg, oral, BID  vitamin B complex-vitamin C-folic acid, 1 capsule, oral, Daily      Continuous medications     PRN medications  PRN medications: acetaminophen, dextrose, dextrose, glucagon, glucagon, oxygen, polyethylene glycol, vancomycin       Physical Exam    Last Recorded Vitals  Blood pressure 136/75, pulse 65, temperature 36.9 °C (98.4 °F), temperature source Oral, resp. rate 20, height 1.727 m (5' 8\"), weight 72.6 kg (160 lb), SpO2 97%.    A&ox3, mild distress  MMM, no lesions  Lungs with scattered crackles, equal air entry  Rrr, no r/g  Abd soft, nt, nd  No allograft tenderness  No edema b/l  LUE AVG patent    Results for orders placed or performed during the hospital encounter of 01/26/25 (from the past 24 hours)   ECG 12 lead   Result Value Ref Range    Ventricular Rate 82 BPM    Atrial Rate 82 BPM    RI Interval 164 ms    QRS Duration 138 ms    QT Interval 386 ms    QTC Calculation(Bazett) 450 ms    P Axis 59 degrees    R Axis -15 degrees    T Axis 69 degrees    QRS Count 14 beats    Q Onset 218 ms    P Onset 136 ms    P Offset 165 ms    T Offset 411 ms    QTC Fredericia 428 ms   Blood Gas Venous Full Panel Unsolicited   Result Value Ref Range    POCT pH, Venous 7.44 (H) 7.33 - 7.43 pH    POCT pCO2, Venous 42 41 - 51 mm Hg    POCT pO2, Venous 46 (H) 35 - 45 mm Hg    POCT SO2, Venous 73 45 - 75 %    POCT Oxy Hemoglobin, Venous 70.9 45.0 - 75.0 %    POCT Hematocrit Calculated, Venous 32.0 (L) 41.0 - 52.0 %    POCT Sodium, Venous 131 (L) 136 - 145 mmol/L    POCT Potassium, Venous 6.2 (HH) 3.5 - 5.3 mmol/L    POCT Chloride, Venous 98 98 - 107 mmol/L    POCT Ionized Calicum, Venous 1.06 (L) 1.10 - 1.33 mmol/L    POCT Glucose, Venous 215 (H) 74 - 99 mg/dL    POCT Lactate, Venous 1.6 0.4 - 2.0 mmol/L    POCT Base Excess, Venous 3.9 (H) -2.0 - 3.0 mmol/L    POCT HCO3 Calculated, Venous 28.5 (H) 22.0 - 26.0 mmol/L    POCT Hemoglobin, Venous 10.8 (L) 13.5 - 17.5 g/dL    POCT Anion Gap, Venous " 11.0 10.0 - 25.0 mmol/L    Patient Temperature 37.0 degrees Celsius   CBC and Auto Differential   Result Value Ref Range    WBC 4.9 4.4 - 11.3 x10*3/uL    nRBC 0.0 0.0 - 0.0 /100 WBCs    RBC 3.59 (L) 4.50 - 5.90 x10*6/uL    Hemoglobin 10.4 (L) 13.5 - 17.5 g/dL    Hematocrit 32.1 (L) 41.0 - 52.0 %    MCV 89 80 - 100 fL    MCH 29.0 26.0 - 34.0 pg    MCHC 32.4 32.0 - 36.0 g/dL    RDW 18.2 (H) 11.5 - 14.5 %    Platelets 57 (L) 150 - 450 x10*3/uL    Neutrophils % 78.5 40.0 - 80.0 %    Immature Granulocytes %, Automated 0.4 0.0 - 0.9 %    Lymphocytes % 10.7 13.0 - 44.0 %    Monocytes % 8.4 2.0 - 10.0 %    Eosinophils % 1.6 0.0 - 6.0 %    Basophils % 0.4 0.0 - 2.0 %    Neutrophils Absolute 3.81 1.20 - 7.70 x10*3/uL    Immature Granulocytes Absolute, Automated 0.02 0.00 - 0.70 x10*3/uL    Lymphocytes Absolute 0.52 (L) 1.20 - 4.80 x10*3/uL    Monocytes Absolute 0.41 0.10 - 1.00 x10*3/uL    Eosinophils Absolute 0.08 0.00 - 0.70 x10*3/uL    Basophils Absolute 0.02 0.00 - 0.10 x10*3/uL   Comprehensive metabolic panel   Result Value Ref Range    Glucose 208 (H) 74 - 99 mg/dL    Sodium 135 (L) 136 - 145 mmol/L    Potassium 5.2 3.5 - 5.3 mmol/L    Chloride 96 (L) 98 - 107 mmol/L    Bicarbonate 29 21 - 32 mmol/L    Anion Gap 15 10 - 20 mmol/L    Urea Nitrogen 30 (H) 6 - 23 mg/dL    Creatinine 7.88 (H) 0.50 - 1.30 mg/dL    eGFR 7 (L) >60 mL/min/1.73m*2    Calcium 8.9 8.6 - 10.6 mg/dL    Albumin 3.7 3.4 - 5.0 g/dL    Alkaline Phosphatase 70 33 - 136 U/L    Total Protein 6.9 6.4 - 8.2 g/dL    AST 32 9 - 39 U/L    Bilirubin, Total 0.6 0.0 - 1.2 mg/dL    ALT 11 10 - 52 U/L   Magnesium   Result Value Ref Range    Magnesium 1.81 1.60 - 2.40 mg/dL   Phosphorus   Result Value Ref Range    Phosphorus 5.0 (H) 2.5 - 4.9 mg/dL   B-Type Natriuretic Peptide   Result Value Ref Range    BNP 2,810 (H) 0 - 99 pg/mL   Troponin I, High Sensitivity, Initial   Result Value Ref Range    Troponin I, High Sensitivity (CMC) 65 (H) 0 - 53 ng/L   Tacrolimus  level   Result Value Ref Range    Tacrolimus  <2.0 <=15.0 ng/mL   Blood Culture    Specimen: Peripheral Venipuncture; Blood culture   Result Value Ref Range    Blood Culture Loaded on Instrument - Culture in progress    Blood Culture    Specimen: Peripheral Venipuncture; Blood culture   Result Value Ref Range    Blood Culture Loaded on Instrument - Culture in progress    Sars-CoV-2 and Influenza A/B PCR   Result Value Ref Range    Flu A Result Not Detected Not Detected    Flu B Result Not Detected Not Detected    Coronavirus 2019, PCR Not Detected Not Detected   Adenovirus PCR Qual For Respiratory Samples   Result Value Ref Range    Adenovirus PCR, Qual Not Detected Not detected   Rhinovirus PCR, Respiratory Spec   Result Value Ref Range    Rhinovirus PCR, Respiratory Spec Not Detected Not Detected   Parainfluenza PCR   Result Value Ref Range    Parainfluenza 1, PCR Not Detected Not Detected, Invalid    Parainfluenza 2, PCR Not Detected Not Detected, Invalid    Parainfluenza 3, PCR Not Detected Not Detected, Invalid    Parainfluenza 4, PCR Not Detected Not Detected, Invalid   Metapneumovirus PCR   Result Value Ref Range    Metapneumovirus (Human), PCR Not Detected Not detected   RSV PCR   Result Value Ref Range    RSV PCR Not Detected Not Detected   Troponin, High Sensitivity, 1 Hour   Result Value Ref Range    Troponin I, High Sensitivity (CMC) 87 (H) 0 - 53 ng/L   Troponin I, High Sensitivity   Result Value Ref Range    Troponin I, High Sensitivity (CMC) 86 (H) 0 - 53 ng/L   POCT GLUCOSE   Result Value Ref Range    POCT Glucose 74 74 - 99 mg/dL   MRSA Surveillance for Vancomycin De-escalation, PCR    Specimen: Anterior Nares; Swab   Result Value Ref Range    MRSA PCR Not Detected Not Detected   POCT GLUCOSE   Result Value Ref Range    POCT Glucose 115 (H) 74 - 99 mg/dL     *Note: Due to a large number of results and/or encounters for the requested time period, some results have not been displayed. A complete set  of results can be found in Results Review.              Assessment/Plan     69 y.o. male with ESRD attributed to hypertension since 1998 s/p kidney transplant #1 3/26/1992 that failed 11/13/2006), s/p kidney transplant #2 7/13/2013 which failed in May 2024 following which he has been on HD (TTS, [facility] via LUE AVG) . He also has known MGUS with IgG and IgA lambda (bone marrow bx 10/2023 with no plasma cell dyscrasia), DM2, HTN, prostate cancer s/p radical prostatectomy, baseline urinary incontinence, HCV s/p treatment (recent HCV PCR negative 7/2024).      Recent admission for gfrat intolerance that was treated with pulse steroids.     Presented to ER with dyspnea, fever, n/v/d. Volume overload, r/o infectious process.    ESRD: on HD  - failed kidney txp on HD since 5/2024 via LUE AVG.   - last HD 1/25 with 2.5L UF per pt. Currently ~3kg above EDW (69).   - no emergent indication for RRT today. Plan for HD 1/27 AM, UF as tolerated to optimize ECF volume status  - TTE pending. BNP elevated  - metablic indices acceptable on AM labs.   - Hb 10, acceptable  - Ca, Phos stable. On Sensipar.  - dose meds for CrCl    Immunosuppression:  - s/p recent pulse steroids for acute on chronic rejection of renal allograft. Symptoms subsided  - cont home tac 0.5 mg q12, pred 5mg/d. Will wean off over the next several weeks.     ID: r/o pneumonia  - on ceftriaxone, azithromycin. Cx pending.     Will follow.    I spent 60 minutes in the professional and overall care of this patient.      Raad Rolle MD

## 2025-01-27 NOTE — NURSING NOTE
Report from Sending RN:    Report From: per chart and Laurence ROLLINS  Recent Surgery of Procedure: No  Baseline Level of Consciousness (LOC): A&OX4  Oxygen Use: No  Type: pulse ox 93% on room air  Diabetic: Yes, blood sugar 121  Last BP Med Given Day of Dialysis: 0703 - nifedipine 90 mg po  Last Pain Med Given: none  Lab Tests to be Obtained with Dialysis: No  Blood Transfusion to be Given During Dialysis: No  Available IV Access: Yes  Medications to be Administered During Dialysis: No  Continuous IV Infusion Running: No  Restraints on Currently or in the Last 24 Hours: No  Hand-Off Communication: Full Code, Contact Plus Precautions - R/O C Diff, came to ED for chest pain, SOB, hx failed kidney transplant X2, ESRD, HTN, hepatitis C, DM  Dialysis Catheter Dressing: N/A  Last Dressing Change: N/A

## 2025-01-27 NOTE — PROGRESS NOTES
Vancomycin Dosing by Pharmacy- Cessation of Therapy    Consult to pharmacy for vancomycin dosing has been discontinued by the prescriber, pharmacy will sign off at this time.    Please call pharmacy if there are further questions or re-enter a consult if vancomycin is resumed.     Jean Edwards, MarinaD

## 2025-01-27 NOTE — NURSING NOTE
Report to Receiving RN:    Report To: AYO Talbert  Time Report Called: [id number]  Hand-Off Communication: Tolerated HD Tx well. Removed 1.0L. Post VS: 151/75, 80, T - 36.8. AOX4.   Complications During Treatment: No  Ultrafiltration Treatment: Yes  Medications Administered During Dialysis: No  Blood Products Administered During Dialysis: N/A  Labs Sent During Dialysis: No  Heparin Drip Rate Changes: N/A  Dialysis Catheter Dressing: N/A  Last Dressing Change: N/A      Last Updated: 4:31 PM by MISSY PUGH

## 2025-01-28 ENCOUNTER — APPOINTMENT (OUTPATIENT)
Dept: CARDIOLOGY | Facility: HOSPITAL | Age: 70
End: 2025-01-28
Payer: MEDICARE

## 2025-01-28 LAB
ALBUMIN SERPL BCP-MCNC: 3.1 G/DL (ref 3.4–5)
ANION GAP SERPL CALC-SCNC: 13 MMOL/L (ref 10–20)
AORTIC VALVE MEAN GRADIENT: 8 MMHG
AORTIC VALVE PEAK VELOCITY: 2.14 M/S
AV PEAK GRADIENT: 18 MMHG
AVA (PEAK VEL): 1.99 CM2
AVA (VTI): 2.24 CM2
BASOPHILS # BLD AUTO: 0.02 X10*3/UL (ref 0–0.1)
BASOPHILS NFR BLD AUTO: 0.7 %
BUN SERPL-MCNC: 27 MG/DL (ref 6–23)
C COLI+JEJ+UPSA DNA STL QL NAA+PROBE: NOT DETECTED
C DIFF TOX A+B STL QL IA: NEGATIVE
CALCIUM SERPL-MCNC: 8.5 MG/DL (ref 8.6–10.6)
CHLORIDE SERPL-SCNC: 98 MMOL/L (ref 98–107)
CO2 SERPL-SCNC: 29 MMOL/L (ref 21–32)
CREAT SERPL-MCNC: 8.41 MG/DL (ref 0.5–1.3)
EC STX1 GENE STL QL NAA+PROBE: NOT DETECTED
EC STX2 GENE STL QL NAA+PROBE: NOT DETECTED
EGFRCR SERPLBLD CKD-EPI 2021: 6 ML/MIN/1.73M*2
EJECTION FRACTION APICAL 4 CHAMBER: 55.9
EJECTION FRACTION: 55 %
EOSINOPHIL # BLD AUTO: 0.09 X10*3/UL (ref 0–0.7)
EOSINOPHIL NFR BLD AUTO: 3 %
ERYTHROCYTE [DISTWIDTH] IN BLOOD BY AUTOMATED COUNT: 17.6 % (ref 11.5–14.5)
GLUCOSE BLD MANUAL STRIP-MCNC: 106 MG/DL (ref 74–99)
GLUCOSE BLD MANUAL STRIP-MCNC: 112 MG/DL (ref 74–99)
GLUCOSE BLD MANUAL STRIP-MCNC: 182 MG/DL (ref 74–99)
GLUCOSE BLD MANUAL STRIP-MCNC: 182 MG/DL (ref 74–99)
GLUCOSE SERPL-MCNC: 109 MG/DL (ref 74–99)
HCT VFR BLD AUTO: 28.8 % (ref 41–52)
HGB BLD-MCNC: 8.8 G/DL (ref 13.5–17.5)
IMM GRANULOCYTES # BLD AUTO: 0.01 X10*3/UL (ref 0–0.7)
IMM GRANULOCYTES NFR BLD AUTO: 0.3 % (ref 0–0.9)
LEFT ATRIUM VOLUME AREA LENGTH INDEX BSA: 44 ML/M2
LEFT VENTRICLE INTERNAL DIMENSION DIASTOLE: 6.1 CM (ref 3.5–6)
LEFT VENTRICULAR OUTFLOW TRACT DIAMETER: 2.1 CM
LYMPHOCYTES # BLD AUTO: 0.55 X10*3/UL (ref 1.2–4.8)
LYMPHOCYTES NFR BLD AUTO: 18.3 %
MAGNESIUM SERPL-MCNC: 1.95 MG/DL (ref 1.6–2.4)
MCH RBC QN AUTO: 28.7 PG (ref 26–34)
MCHC RBC AUTO-ENTMCNC: 30.6 G/DL (ref 32–36)
MCV RBC AUTO: 94 FL (ref 80–100)
MITRAL VALVE E/A RATIO: 2.46
MONOCYTES # BLD AUTO: 0.39 X10*3/UL (ref 0.1–1)
MONOCYTES NFR BLD AUTO: 13 %
NEUTROPHILS # BLD AUTO: 1.95 X10*3/UL (ref 1.2–7.7)
NEUTROPHILS NFR BLD AUTO: 64.7 %
NOROVIRUS GI + GII RNA STL NAA+PROBE: NOT DETECTED
NRBC BLD-RTO: 0 /100 WBCS (ref 0–0)
PHOSPHATE SERPL-MCNC: 4.4 MG/DL (ref 2.5–4.9)
PLATELET # BLD AUTO: 59 X10*3/UL (ref 150–450)
POTASSIUM SERPL-SCNC: 4.2 MMOL/L (ref 3.5–5.3)
RBC # BLD AUTO: 3.07 X10*6/UL (ref 4.5–5.9)
RIGHT VENTRICLE FREE WALL PEAK S': 11 CM/S
RV RNA STL NAA+PROBE: NOT DETECTED
SALMONELLA DNA STL QL NAA+PROBE: NOT DETECTED
SHIGELLA DNA SPEC QL NAA+PROBE: NOT DETECTED
SODIUM SERPL-SCNC: 136 MMOL/L (ref 136–145)
TACROLIMUS BLD-MCNC: <2 NG/ML
TRICUSPID ANNULAR PLANE SYSTOLIC EXCURSION: 2.8 CM
V CHOLERAE DNA STL QL NAA+PROBE: NOT DETECTED
WBC # BLD AUTO: 3 X10*3/UL (ref 4.4–11.3)
Y ENTEROCOL DNA STL QL NAA+PROBE: NOT DETECTED

## 2025-01-28 PROCEDURE — 2500000004 HC RX 250 GENERAL PHARMACY W/ HCPCS (ALT 636 FOR OP/ED): Performed by: INTERNAL MEDICINE

## 2025-01-28 PROCEDURE — 80197 ASSAY OF TACROLIMUS: CPT

## 2025-01-28 PROCEDURE — 2500000005 HC RX 250 GENERAL PHARMACY W/O HCPCS

## 2025-01-28 PROCEDURE — 1100000001 HC PRIVATE ROOM DAILY

## 2025-01-28 PROCEDURE — 2500000004 HC RX 250 GENERAL PHARMACY W/ HCPCS (ALT 636 FOR OP/ED)

## 2025-01-28 PROCEDURE — 82947 ASSAY GLUCOSE BLOOD QUANT: CPT

## 2025-01-28 PROCEDURE — 97165 OT EVAL LOW COMPLEX 30 MIN: CPT | Mod: GO

## 2025-01-28 PROCEDURE — 2500000002 HC RX 250 W HCPCS SELF ADMINISTERED DRUGS (ALT 637 FOR MEDICARE OP, ALT 636 FOR OP/ED)

## 2025-01-28 PROCEDURE — 2500000001 HC RX 250 WO HCPCS SELF ADMINISTERED DRUGS (ALT 637 FOR MEDICARE OP)

## 2025-01-28 PROCEDURE — 85025 COMPLETE CBC W/AUTO DIFF WBC: CPT

## 2025-01-28 PROCEDURE — 99232 SBSQ HOSP IP/OBS MODERATE 35: CPT | Performed by: INTERNAL MEDICINE

## 2025-01-28 PROCEDURE — 83735 ASSAY OF MAGNESIUM: CPT

## 2025-01-28 PROCEDURE — 93325 DOPPLER ECHO COLOR FLOW MAPG: CPT

## 2025-01-28 PROCEDURE — 93308 TTE F-UP OR LMTD: CPT | Performed by: INTERNAL MEDICINE

## 2025-01-28 PROCEDURE — 93321 DOPPLER ECHO F-UP/LMTD STD: CPT | Performed by: INTERNAL MEDICINE

## 2025-01-28 PROCEDURE — 36415 COLL VENOUS BLD VENIPUNCTURE: CPT

## 2025-01-28 PROCEDURE — 93325 DOPPLER ECHO COLOR FLOW MAPG: CPT | Performed by: INTERNAL MEDICINE

## 2025-01-28 PROCEDURE — 80069 RENAL FUNCTION PANEL: CPT

## 2025-01-28 RX ORDER — VANCOMYCIN HCL 50 MG/ML
125 SOLUTION, RECONSTITUTED, ORAL ORAL EVERY 6 HOURS
Qty: 95 ML | Refills: 0 | Status: CANCELLED | OUTPATIENT
Start: 2025-01-28 | End: 2025-02-07

## 2025-01-28 RX ORDER — PREDNISONE 5 MG/1
5 TABLET ORAL DAILY
Status: CANCELLED
Start: 2025-01-29 | End: 2025-03-24

## 2025-01-28 RX ORDER — PARICALCITOL 5 UG/ML
10 INJECTION, SOLUTION INTRAVENOUS 3 TIMES WEEKLY
Status: CANCELLED
Start: 2025-01-29

## 2025-01-28 RX ORDER — VANCOMYCIN HCL 50 MG/ML
125 SOLUTION, RECONSTITUTED, ORAL ORAL EVERY 6 HOURS
Status: DISCONTINUED | OUTPATIENT
Start: 2025-01-28 | End: 2025-02-05 | Stop reason: HOSPADM

## 2025-01-28 RX ORDER — ISOSORBIDE MONONITRATE 30 MG/1
60 TABLET, EXTENDED RELEASE ORAL DAILY
Status: DISCONTINUED | OUTPATIENT
Start: 2025-01-28 | End: 2025-02-05 | Stop reason: HOSPADM

## 2025-01-28 RX ADMIN — NIFEDIPINE 90 MG: 90 TABLET, FILM COATED, EXTENDED RELEASE ORAL at 06:04

## 2025-01-28 RX ADMIN — TACROLIMUS 0.5 MG: 0.5 CAPSULE ORAL at 21:42

## 2025-01-28 RX ADMIN — HEPARIN SODIUM 5000 UNITS: 5000 INJECTION, SOLUTION INTRAVENOUS; SUBCUTANEOUS at 06:04

## 2025-01-28 RX ADMIN — KETOROLAC TROMETHAMINE 1 DROP: 5 SOLUTION OPHTHALMIC at 09:10

## 2025-01-28 RX ADMIN — CARVEDILOL 25 MG: 25 TABLET, FILM COATED ORAL at 09:10

## 2025-01-28 RX ADMIN — ISOSORBIDE MONONITRATE 60 MG: 30 TABLET, EXTENDED RELEASE ORAL at 09:13

## 2025-01-28 RX ADMIN — CINACALCET HYDROCHLORIDE 30 MG: 30 TABLET, FILM COATED ORAL at 09:10

## 2025-01-28 RX ADMIN — RENO CAPS 1 CAPSULE: 100; 1.5; 1.7; 20; 10; 1; 150; 5; 6 CAPSULE ORAL at 09:10

## 2025-01-28 RX ADMIN — VANCOMYCIN HYDROCHLORIDE 125 MG: KIT at 15:21

## 2025-01-28 RX ADMIN — AZITHROMYCIN DIHYDRATE 500 MG: 500 TABLET ORAL at 09:10

## 2025-01-28 RX ADMIN — PIPERACILLIN SODIUM AND TAZOBACTAM SODIUM 2.25 G: 2; .25 INJECTION, SOLUTION INTRAVENOUS at 14:14

## 2025-01-28 RX ADMIN — ACETAMINOPHEN 975 MG: 325 TABLET ORAL at 03:13

## 2025-01-28 RX ADMIN — CARVEDILOL 37.5 MG: 12.5 TABLET, FILM COATED ORAL at 21:42

## 2025-01-28 RX ADMIN — KETOROLAC TROMETHAMINE 1 DROP: 5 SOLUTION OPHTHALMIC at 15:23

## 2025-01-28 RX ADMIN — VANCOMYCIN HYDROCHLORIDE 125 MG: KIT at 09:13

## 2025-01-28 RX ADMIN — NEOMYCIN SULFATE, POLYMYXIN B SULFATE AND DEXAMETHASONE 1 DROP: 3.5; 10000; 1 SUSPENSION OPHTHALMIC at 09:10

## 2025-01-28 RX ADMIN — PRAVASTATIN SODIUM 10 MG: 10 TABLET ORAL at 21:42

## 2025-01-28 RX ADMIN — HEPARIN SODIUM 5000 UNITS: 5000 INJECTION, SOLUTION INTRAVENOUS; SUBCUTANEOUS at 21:42

## 2025-01-28 RX ADMIN — VANCOMYCIN HYDROCHLORIDE 125 MG: KIT at 21:43

## 2025-01-28 RX ADMIN — ACETAMINOPHEN 975 MG: 325 TABLET ORAL at 17:48

## 2025-01-28 RX ADMIN — BRIMONIDINE TARTRATE 1 DROP: 2 SOLUTION/ DROPS OPHTHALMIC at 09:10

## 2025-01-28 RX ADMIN — PIPERACILLIN SODIUM AND TAZOBACTAM SODIUM 2.25 G: 2; .25 INJECTION, SOLUTION INTRAVENOUS at 21:42

## 2025-01-28 RX ADMIN — HEPARIN SODIUM 5000 UNITS: 5000 INJECTION, SOLUTION INTRAVENOUS; SUBCUTANEOUS at 14:16

## 2025-01-28 RX ADMIN — VANCOMYCIN HYDROCHLORIDE 125 MG: KIT at 02:57

## 2025-01-28 RX ADMIN — PIPERACILLIN SODIUM AND TAZOBACTAM SODIUM 2.25 G: 2; .25 INJECTION, SOLUTION INTRAVENOUS at 02:57

## 2025-01-28 RX ADMIN — PANTOPRAZOLE SODIUM 40 MG: 40 TABLET, DELAYED RELEASE ORAL at 06:04

## 2025-01-28 RX ADMIN — KETOROLAC TROMETHAMINE 1 DROP: 5 SOLUTION OPHTHALMIC at 21:49

## 2025-01-28 RX ADMIN — PREDNISONE 5 MG: 5 TABLET ORAL at 09:10

## 2025-01-28 RX ADMIN — TACROLIMUS 0.5 MG: 0.5 CAPSULE ORAL at 09:10

## 2025-01-28 RX ADMIN — SEVELAMER CARBONATE 800 MG: 800 TABLET, FILM COATED ORAL at 06:04

## 2025-01-28 RX ADMIN — INSULIN GLARGINE 15 UNITS: 100 INJECTION, SOLUTION SUBCUTANEOUS at 09:11

## 2025-01-28 RX ADMIN — INSULIN LISPRO 2 UNITS: 100 INJECTION, SOLUTION INTRAVENOUS; SUBCUTANEOUS at 17:38

## 2025-01-28 RX ADMIN — NEOMYCIN SULFATE, POLYMYXIN B SULFATE AND DEXAMETHASONE 1 DROP: 3.5; 10000; 1 SUSPENSION OPHTHALMIC at 17:41

## 2025-01-28 RX ADMIN — BRIMONIDINE TARTRATE 1 DROP: 2 SOLUTION/ DROPS OPHTHALMIC at 21:49

## 2025-01-28 RX ADMIN — SEVELAMER CARBONATE 800 MG: 800 TABLET, FILM COATED ORAL at 15:21

## 2025-01-28 ASSESSMENT — PAIN SCALES - GENERAL
PAINLEVEL_OUTOF10: 0 - NO PAIN
PAINLEVEL_OUTOF10: 3
PAINLEVEL_OUTOF10: 3

## 2025-01-28 ASSESSMENT — ACTIVITIES OF DAILY LIVING (ADL)
BATHING_ASSISTANCE: STAND BY
ADL_ASSISTANCE: INDEPENDENT

## 2025-01-28 ASSESSMENT — PAIN DESCRIPTION - LOCATION
LOCATION: HEAD
LOCATION: HEAD

## 2025-01-28 ASSESSMENT — PAIN - FUNCTIONAL ASSESSMENT
PAIN_FUNCTIONAL_ASSESSMENT: 0-10

## 2025-01-28 ASSESSMENT — COGNITIVE AND FUNCTIONAL STATUS - GENERAL
DRESSING REGULAR LOWER BODY CLOTHING: A LITTLE
HELP NEEDED FOR BATHING: A LITTLE

## 2025-01-28 NOTE — CARE PLAN
Problem: Diabetes  Goal: Achieve decreasing blood glucose levels by end of shift  Outcome: Progressing  Goal: Increase stability of blood glucose readings by end of shift  Outcome: Progressing  Goal: Decrease in ketones present in urine by end of shift  Outcome: Progressing  Goal: Maintain electrolyte levels within acceptable range throughout shift  Outcome: Progressing  Goal: Maintain glucose levels >70mg/dl to <250mg/dl throughout shift  Outcome: Progressing  Goal: No changes in neurological exam by end of shift  Outcome: Progressing  Goal: Learn about and adhere to nutrition recommendations by end of shift  Outcome: Progressing  Goal: Vital signs within normal range for age by end of shift  Outcome: Progressing  Goal: Increase self care and/or family involovement by end of shift  Outcome: Progressing  Goal: Receive DSME education by end of shift  Outcome: Progressing     Problem: Pain - Adult  Goal: Verbalizes/displays adequate comfort level or baseline comfort level  Outcome: Progressing     Problem: Safety - Adult  Goal: Free from fall injury  Outcome: Progressing   The patient's goals for the shift include      The clinical goals for the shift include pt will not have increased O2 demand this shift

## 2025-01-28 NOTE — PROGRESS NOTES
"Physical Therapy                 Therapy Communication Note    Patient Name: Manolo Bashir  MRN: [id number]  Department: [facility] [address]  Room: [address] [place] [address]A  Today's Date: 1/28/2025     Discipline: Physical Therapy    PT Missed Visit: Yes     Missed Visit Reason: Missed Visit Reason:  (@1231 pt reports feeling fatigued from frequent bathroom trips, \"I have no energy\".  Encouraged to participate in therapy next session available.)    Missed Time: Attempt      01/28/25 at 3:37 PM - Alexandra Nieves, PT   "

## 2025-01-28 NOTE — PROGRESS NOTES
MEDICINE INPATIENT PROGRESS NOTE    Manolo Bashir is a 69 y.o. male on hospital day 2 who presented with Shortness of breath    Subjective   NAEO.    Patient seen this morning resting comfortably in bed. States that his nausea/vomiting/cough/SOB have all resolved. Does report having 4 episodes of loose watery stools. Otherwise denies fever, chills, chest pain, dyspnea, abdominal pain, nausea, vomiting, leg swelling, and dysuria. No additional questions or concerns at this time.       Objective     Last Recorded Vitals  Vitals:    01/27/25 2049 01/27/25 2243 01/28/25 0552 01/28/25 0904   BP: 157/74 144/70 174/80 (!) 189/83   BP Location: Right arm      Patient Position: Lying      Pulse: 75 83 73 74   Resp:  16 16    Temp:  36.6 °C (97.9 °F) 36.6 °C (97.9 °F) 36.6 °C (97.9 °F)   TempSrc:       SpO2: 98% 99% 95% 96%   Weight:       Height:           Intake/Output  I/O last 2 completed shifts:  In: 1400 (19.3 mL/kg) [I.V.:800 (11 mL/kg); Other:400; IV Piggyback:200]  Out: 1400 (19.3 mL/kg) [Other:1400]  Weight: 72.6 kg     Physical Exam  Constitutional: Patient does not appear to be in any acute distress, Aox4  Cardio: RRR, S1/S2, no MRG  Pulm: CTAB no wheezes, normal respiratory effort  GI: +BS, soft, tender over LLQ (transplanted kidney) nondistended, no guarding or rebound, no masses noted  MSK: No joint swelling, multiple aneurysms and AV fistula noted on LUE  Skin: No lesions, contusions, or erythema.  Extremities: no BLE swelling   Neuro: No focal deficits  Psych: Appropriate mood and behavior    Labs    CBC  Results from last 7 days   Lab Units 01/28/25  0550 01/27/25  0653 01/26/25  0622   HEMOGLOBIN g/dL 8.8* 8.9* 10.4*   HEMATOCRIT % 28.8* 27.1* 32.1*   WBC AUTO x10*3/uL 3.0* 3.6* 4.9   PLATELETS AUTO x10*3/uL 59* 55* 57*      BMP  Results from last 7 days   Lab Units 01/28/25  0550 01/27/25  0653 01/26/25  0622   SODIUM mmol/L 136 136 135*   POTASSIUM mmol/L 4.2 5.2 5.2   CHLORIDE mmol/L 98 98 96*   CO2  "mmol/L 29 29 29   BUN mg/dL 27* 44* 30*   CREATININE mg/dL 8.41* 11.07* 7.88*   MAGNESIUM mg/dL 1.95 1.94 1.81   PHOSPHORUS mg/dL 4.4 6.7* 5.0*   ANION GAP mmol/L 13 14 15   GLUCOSE mg/dL 109* 77 208*   CALCIUM mg/dL 8.5* 8.4* 8.9     LFTS  Results from last 7 days   Lab Units 01/26/25  0622   ALT U/L 11   AST U/L 32   ALK PHOS U/L 70   BILIRUBIN TOTAL mg/dL 0.6     COAGS        No lab exists for component: \"PT\"     Micro  No results found for the last 90 days.       Imaging  XR chest 2 views    Result Date: 1/26/2025  1.  Mild central pulmonary vascular congestion with patchy perihilar/bibasilar airspace opacities. Recommend correlation with patient's volume status as findings could reflect interstitial edema. Superimposed infectious process is not definitively excluded. 2. Small bilateral pleural effusions.   I personally reviewed the images/study and I agree with the findings as stated by resident Jean Rowland. This study was interpreted at [place], [place].   MACRO: None   Signed by: James Prasad 1/26/2025 9:31 AM Dictation workstation:   [id number]    CT abdomen pelvis wo IV contrast    Result Date: 1/5/2025  1. Interval development of marked fat stranding adjacent to the left iliac fossa renal transplant which appears edematous and heterogeneous, new compared to 09/10/2024 CT. No perinephric fluid collections. Findings concerning for urinary tract infection/pyelonephritis. Correlation with urinalysis is recommended. 2. Slight interval decrease in mild right pleural effusion with interval resolution of previously noted small left pleural effusion. Persistent bilateral ground-glass opacities with mild interlobular septal thickening, likely pulmonary edema. 3. Mild splenomegaly measuring up to 13.5 cm in craniocaudal dimension, which has improved compared to 14.5 cm on 09/10/2024 CT. 4. Additional findings as described above.   I personally reviewed the " images/study and I agree with the findings as stated by Kendrick Purdy MD. This study was interpreted at [facility], [place], [place].   MACRO: None.   Signed by: Hue Aguilar 1/5/2025 2:17 PM Dictation workstation:   [id number]      Medications   Scheduled Medications  brimonidine, 1 drop, Right Eye, BID  bumetanide, 4 mg, intravenous, Once  carvedilol, 25 mg, oral, BID  cinacalcet, 30 mg, oral, Daily  epoetin aida or biosimilar, 5,000 Units, intravenous, Once per day on Monday Wednesday Friday  heparin (porcine), 5,000 Units, subcutaneous, q8h ZOË  imiquimod, 1 packet, Topical, Once per day on Monday Wednesday Friday  insulin glargine, 15 Units, subcutaneous, q AM  insulin lispro, 0-10 Units, subcutaneous, TID AC  isosorbide mononitrate ER, 60 mg, oral, Daily  ketorolac, 1 drop, Right Eye, TID  neomycin-polymyxin-dexAMETHasone, 1 drop, Right Eye, q8h ZOË  NIFEdipine ER, 90 mg, oral, Daily before breakfast  pantoprazole, 40 mg, oral, Daily before breakfast  paricalcitol, 10 mcg, intravenous, Once per day on Monday Wednesday Friday  piperacillin-tazobactam, 2.25 g, intravenous, q8h  pravastatin, 10 mg, oral, Nightly  predniSONE, 5 mg, oral, Daily  sevelamer carbonate, 800 mg, oral, TID AC  tacrolimus, 0.5 mg, oral, BID  vancomycin, 125 mg, oral, q6h  vitamin B complex-vitamin C-folic acid, 1 capsule, oral, Daily     Continuous Medications    PRN Medications  PRN medications: acetaminophen, dextrose, dextrose, glucagon, glucagon, oxygen, polyethylene glycol          Assessment/Plan   Manolo Bashir is a 69 y.o. male with a PMH of ESRD (on dialysis, TThSa schedule, most recent dialysis 1/25), c/b impaired allograft function currently on immunosuppression (prednisone, tacrolimus), IgG and IgA lambda MGUS (BM biopsy 10/23 without evidence of myeloma), T2DM (on 20 units Lantus in AM, Lispro 3 units TID + SSI), HTN, prostate cancer (s/p radical prostatectomy 10/2013), HCV (s/p treatment,  PCR negative 10/2023), and gastroparesis who presents for 1 day history of SOB, nausea/vomiting, diarrhea, and general malaise that started during dialysis on 1/25. Presentation is concerning for fluid overload 2/2 ESRD, however cannot rule out cardiac etiologies of pulmonary edema. Also, given the patient's fever, history of immunocompromise, and elevated procalcitonin, there is concern for potential underlying pneumonia so he is being treated for nosocomial pneumonia. Additionally, C. Diff PCR was positive (although toxin negative) but will continue to treat given significant risk factors.      Updates 1/28/25  - C. 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 Pneumonia, improved  - Patient hypoxic to 88% on RA on admission, required 3L supplemental O2 briefly in ED, was weaned off  - CXR on admission with evidence of pulmonary vascular congestion, pulmonary edema and pleural effusions, cannot rule out potential infiltrate  - Patient febrile on admission to 38.4  - Hx of ESRD 2/2 HTN s/p 2 allographic kidney transplants c/b chronic rejection, dialysis TThSa, has not missed dialysis sessions  - Most recent TTE 4/4/24, EF 54%, dilated/hypertrophic left ventricle, dilated LA/RA, mild to moderate aortic regurgitation  - COVID/Flu/RSV negative, other viral URI studies negative so far  - QTc on admission 450 ms  -Procal elevated to 0.95  - Troponin trend 65 -> 87 -> 86  -MRSA nares negative 1/26  PLAN  - Transplant nephrology consulted, appreciate recommendations                - Additional dialysis scheduled for 1/27  - Continue prednisone 5 mg daily  - Continue tacrolimus 0.5 mg BID  - Per nephrology:   -  Started DARIANA   - Stopped calcitriol, on parenteral zemplar OP   - Started sevelamer 800 mg TID  - Continue azithromycin 500 mg daily  - Continue Zosyn 2.25 g IV q8h, may transition to PO Augmentin 1/29  - TTE ordered 1/26, f/u  - CTM daily RFP, CBC  - Supplemental O2 PRN, goal SpO2 > 92%  - Strict I/Os  - Daily standing weights as tolerated     #C. diff infection, recurrent  #Gastroparesis  #Nausea and vomiting, improved  #Diarrhea, improving  #c/f gastroenteritis  - C. Diff PCR +, toxin negative, however given risk factors of immunocompromise, recent abx, and frequent hospital/health care setting visits, will treat  - Hx of C. Diff in 2024, treated with PO vancomycin  PLAN  - Started PO vancomycin 125 mg q6h x 10 days (through 2/6)  - Stool O/P ordered, pending  - Giardia, cryptosporidium antigen ordered, pending  - Consider restarting metoclopramide if nausea/vomiting recur  - CTM stool output and abdominal exams     #IgG and IgA lambda MGUS  #Thrombocytopenia  - Thrombocytopenia chronic, however baseline ~90s, may be decreased in the setting of viral infection vs. Immunosuppression vs. MGUS  - 4T score 3, did receive heparin on prior admission 1/5-1/8 but low suspicion for HIT  PLAN  - CTM on daily CBC  - Continue home cinacalcet 30 mg daily     #Hx of recent cataract surgery  - Continue home eye drops:                - Maxitrol 1 drop R eye q8h                - brimonidine 2% solution 1 drop R eye BID                - ketorolac 0.5% solution 1 drop R eye q8h     #HTN  - Continue home nifedipine 90 mg daily  - Restarted home carvedilol 25 mg BID  - Hold home Imdur 60 mg daily  - CTM, may restart home medications if becomes hypertensive     #T2DM  - Most recent A1c 9.1 12/16/24  PLAN  - Continue Lantus 15 units in AM + #2 SSI     #HLD  - Continue home pravastatin 10 mg daily     #GERD  - Continue home pantoprazole 40 mg daily    F: Replete PRN  E: Replete PRN  N: Adult diet Phosphorus restricted 1 gm   A:  PIV    Oxygen: None  Abx: neomycin-polymyxin-dexAMETHasone - 3.5mg/mL-10,000 unit/mL-0.1 %, 3.5mg/mL-10,000 unit/mL-0.1 %  piperacillin-tazobactam - 2.25 gram/50 mL  sulfamethoxazole-trimethoprim - 800-160 mg  vancomycin - 50 mg/mL     DVT Ppx: Heparin SubQ  GI Ppx: Pantoprazole  GI Laxative: Miralax PRN    Code Status: full (confirmed on admission)  Surrogate Medical Decision-maker:  Amalia Enriquez ([phone])        Renay Cheung, MS4  Please Haiku with any questions or concerns.

## 2025-01-28 NOTE — CARE PLAN
The patient's goals for the shift include      The clinical goals for the shift include pt will not have increased O2 demand this shift      Problem: Diabetes  Goal: Maintain glucose levels >70mg/dl to <250mg/dl throughout shift  Outcome: Progressing     Problem: Safety - Adult  Goal: Free from fall injury  Outcome: Progressing     Problem: Discharge Planning  Goal: Discharge to home or other facility with appropriate resources  Outcome: Progressing     Problem: Chronic Conditions and Co-morbidities  Goal: Patient's chronic conditions and co-morbidity symptoms are monitored and maintained or improved  Outcome: Progressing     Problem: Nutrition  Goal: Nutrient intake appropriate for maintaining nutritional needs  Outcome: Progressing

## 2025-01-28 NOTE — PROGRESS NOTES
Occupational Therapy    Evaluation    Patient Name: Manolo Bashri  MRN: [id number]  Today's Date: 1/28/2025  Room: [address] [place] [address]  Time Calculation  Start Time: 0845  Stop Time: 0857  Time Calculation (min): 12 min    Assessment  IP OT Assessment  OT Assessment: pt is completing ADL tasks and functional transfers/mobility w SBA for safety and does not demonstrate the need for continued skilled acute OT services. Pt has no questions or concerns and is safe to return home w/o continued OT services.  Prognosis: Good  Barriers to Discharge Home: No anticipated barriers  Evaluation/Treatment Tolerance: Patient tolerated treatment well  Medical Staff Made Aware: Yes  End of Session Communication: Bedside nurse  End of Session Patient Position: Bed, 2 rail up, Alarm off, not on at start of session (seated at EOB)  Plan:  Inpatient Plan  No Skilled OT: Safe to return home  OT Frequency: OT eval only  OT Discharge Recommendations: No OT needed after discharge, No further acute OT  Equipment Recommended upon Discharge: Other (comment) (none)  OT Recommended Transfer Status: Stand by assist  OT - OK to Discharge: Yes  OT Assessment  Prognosis: Good  Evaluation/Treatment Tolerance: Patient tolerated treatment well  Medical Staff Made Aware: Yes  Strengths: Attitude of self, Ability to acquire knowledge, Capable of completing ADLs semi/independent  Barriers to Participation: Comorbidities    Subjective   Current Problem:  1. Shortness of breath        2. Pneumonia due to infectious organism, unspecified laterality, unspecified part of lung        3. Chronic heart failure with preserved ejection fraction  Transthoracic Echo (TTE) Limited    Transthoracic Echo (TTE) Limited      4. ESRD (end stage renal disease) on dialysis (Multi)  Transthoracic Echo (TTE) Limited    Transthoracic Echo (TTE) Limited      5. Hypervolemia, unspecified hypervolemia type  Transthoracic Echo (TTE) Limited    Transthoracic Echo (TTE) Limited         General:  Reason for Referral: presents with a 1 day history of chills, diarrhea, nausea, vomiting, and SOB.  Past Medical History Relevant to Rehab: PMH of ESRD (on dialysis, TThSa schedule, most recent dialysis 1/25), s/p two renal transplants c/b impaired allograft function currently on immunosuppression (prednisone, tacrolimus), IgG and IgA lambda MGUS (BM biopsy 10/23 without evidence of myeloma), T2DM (on 20 units Lantus in AM, Lispro 3 units TID + SSI), HTN, prostate cancer (s/p radical prostatectomy 10/2013), HCV (s/p treatment, PCR negative 10/2023), and gastroparesis  Prior to Session Communication: Bedside nurse  Patient Position Received: Bed, 2 rail up, Alarm off, not on at start of session  Family/Caregiver Present: No  General Comment: Pt supine in bed upon OT arrival. Pt was pleasant and agreeable to participate in therapy session.   Precautions:  Medical Precautions: Fall precautions, Infection precautions (contact plus precautions)      Pain:  Pain Assessment  Pain Assessment: 0-10  0-10 (Numeric) Pain Score: 0 - No pain  Lines/Tubes/Drains:  Hemodialysis Arteriovenous Graft Left Upper arm (Active)   Number of days:          Objective   Cognition:  Overall Cognitive Status: Within Functional Limits  Arousal/Alertness: Appropriate responses to stimuli  Orientation Level: Oriented X4  Following Commands: Follows all commands and directions without difficulty  Attention: Within Functional Limits  Processing Speed: Within funtional limits           Home Living:  Type of Home: House  Lives With: Adult children (daughter)  Home Adaptive Equipment: Walker rolling or standard (reports has a walker available if needed)  Home Layout: Multi-level, Bed/bath upstairs  Alternate Level Stairs-Rails: Right  Alternate Level Stairs-Number of Steps: 2 flights  Home Access: Stairs to enter with rails  Entrance Stairs-Rails: Both  Entrance Stairs-Number of Steps: 8  Bathroom Shower/Tub: Tub/shower unit  Bathroom  "Toilet: Standard  Bathroom Equipment: Shower chair with back   Prior Function:  Level of [place]: Independent with ADLs and functional transfers, Independent with homemaking with ambulation  ADL Assistance: Independent  Homemaking Assistance: Independent  Ambulatory Assistance: Independent  Vocational: On disability  Leisure: \"staying active\"  Hand Dominance: Right  Prior Function Comments: +drives, no report of recent falls.  IADL History:  Homemaking Responsibilities: Yes  Meal Prep Responsibility: Primary  Laundry Responsibility: Primary  Cleaning Responsibility: Primary  Bill Paying/Finance Responsibility: Primary  Shopping Responsibility: Primary  Current License: Yes  Mode of Transportation: Car  Occupation: On disability  Leisure and Hobbies: \"staying active\"  ADL:  Eating Assistance: Independent (anticipated)  Grooming Assistance: Independent (anticipated)  Bathing Assistance: Stand by (anticipated)  Bathing Deficit: Supervision/safety  UE Dressing Assistance: Independent (anticipated)  LE Dressing Assistance: Stand by  LE Dressing Deficit: Supervision/safety  Toileting Assistance with Device: Independent (anticipated)  Activity Tolerance:  Endurance: Tolerates 10 - 20 min exercise with multiple rests  Balance:  Dynamic Sitting Balance  Dynamic Sitting-Balance Support: Feet supported, No upper extremity supported  Dynamic Sitting-Level of Assistance: Independent  Dynamic Standing Balance  Dynamic Standing-Balance Support: No upper extremity supported  Dynamic Standing-Level of Assistance: Distant supervision  Static Sitting Balance  Static Sitting-Balance Support: Feet supported, No upper extremity supported  Static Sitting-Level of Assistance: Independent  Bed Mobility/Transfers: Bed Mobility  Bed Mobility: Yes  Bed Mobility 1  Bed Mobility 1: Supine to sitting  Level of Assistance 1: Distant supervision  Bed Mobility Comments 1: HOB slightly elevated  Functional Mobility  Functional Mobility " "Performed: Yes  Functional Mobility 1  Surface 1: Level tile  Device 1: No device  Assistance 1: Close supervision  Comments 1: pt completed functional mobility from bed<>doorway in room w SBA for safety, in preparation for future engagement in ADLs/iADLs.   and Transfers  Transfer: Yes  Transfer 1  Transfer From 1: Sit to, Stand to  Transfer to 1: Stand, Sit  Technique 1: Sit to stand, Stand to sit  Transfer Device 1:  (no device)  Transfer Level of Assistance 1: Close supervision  IADL's:   Homemaking Responsibilities: Yes  Meal Prep Responsibility: Primary  Laundry Responsibility: Primary  Cleaning Responsibility: Primary  Bill Paying/Finance Responsibility: Primary  Shopping Responsibility: Primary  Current License: Yes  Mode of Transportation: Car  Occupation: On disability  Leisure and Hobbies: \"staying active\"  Vision: Vision - Basic Assessment  Current Vision: No visual deficits    Sensation:  Light Touch: No apparent deficits  Strength:  Strength Comments: BUE 4/5 when formally assessed  Perception:  Inattention/Neglect: Appears intact  Coordination:  Movements are Fluid and Coordinated: Yes   Hand Function:  Hand Function  Gross Grasp: Functional  Coordination: Functional  Extremities: RUE   RUE : Within Functional Limits (4/5 when formally assessed), LUE   LUE: Within Functional Limits (4/5 when formally assessed)     Outcome Measures: [facility] Daily Activity  Putting on and taking off regular lower body clothing: A little  Bathing (including washing, rinsing, drying): A little  Putting on and taking off regular upper body clothing: None  Toileting, which includes using toilet, bedpan or urinal: None  Taking care of personal grooming such as brushing teeth: None  Eating Meals: None  Daily Activity - Total Score: 22      OT Adult Other Outcome Measures  4AT: -    Education Documentation  Precautions, taught by Bryanna Sanchez OT at 1/28/2025  9:19 AM.  Learner: Patient  Readiness: Acceptance  Method: " Explanation  Response: Verbalizes Understanding    Body Mechanics, taught by Fadi Sanchez OT at 1/28/2025  9:19 AM.  Learner: Patient  Readiness: Acceptance  Method: Explanation  Response: Verbalizes Understanding    ADL Training, taught by Fadi Sanchez OT at 1/28/2025  9:19 AM.  Learner: Patient  Readiness: Acceptance  Method: Explanation  Response: Verbalizes Understanding    Education Comments  No comments found.          01/28/25 at 9:20 AM   FADI SANCHEZ OT   Rehab Office: [phone]

## 2025-01-29 ENCOUNTER — APPOINTMENT (OUTPATIENT)
Dept: RADIOLOGY | Facility: HOSPITAL | Age: 70
DRG: 640 | End: 2025-01-29
Payer: MEDICARE

## 2025-01-29 LAB
ALBUMIN SERPL BCP-MCNC: 3.2 G/DL (ref 3.4–5)
ANION GAP SERPL CALC-SCNC: 15 MMOL/L (ref 10–20)
BASOPHILS # BLD AUTO: 0.01 X10*3/UL (ref 0–0.1)
BASOPHILS NFR BLD AUTO: 0.3 %
BUN SERPL-MCNC: 37 MG/DL (ref 6–23)
CALCIUM SERPL-MCNC: 8.7 MG/DL (ref 8.6–10.6)
CHLORIDE SERPL-SCNC: 95 MMOL/L (ref 98–107)
CO2 SERPL-SCNC: 28 MMOL/L (ref 21–32)
CREAT SERPL-MCNC: 10.91 MG/DL (ref 0.5–1.3)
EGFRCR SERPLBLD CKD-EPI 2021: 5 ML/MIN/1.73M*2
EOSINOPHIL # BLD AUTO: 0.11 X10*3/UL (ref 0–0.7)
EOSINOPHIL NFR BLD AUTO: 3.2 %
ERYTHROCYTE [DISTWIDTH] IN BLOOD BY AUTOMATED COUNT: 16.5 % (ref 11.5–14.5)
GLUCOSE BLD MANUAL STRIP-MCNC: 158 MG/DL (ref 74–99)
GLUCOSE BLD MANUAL STRIP-MCNC: 253 MG/DL (ref 74–99)
GLUCOSE BLD MANUAL STRIP-MCNC: 256 MG/DL (ref 74–99)
GLUCOSE BLD MANUAL STRIP-MCNC: 60 MG/DL (ref 74–99)
GLUCOSE BLD MANUAL STRIP-MCNC: 70 MG/DL (ref 74–99)
GLUCOSE SERPL-MCNC: 171 MG/DL (ref 74–99)
HCT VFR BLD AUTO: 28 % (ref 41–52)
HGB BLD-MCNC: 9.1 G/DL (ref 13.5–17.5)
IMM GRANULOCYTES # BLD AUTO: 0.02 X10*3/UL (ref 0–0.7)
IMM GRANULOCYTES NFR BLD AUTO: 0.6 % (ref 0–0.9)
LYMPHOCYTES # BLD AUTO: 0.51 X10*3/UL (ref 1.2–4.8)
LYMPHOCYTES NFR BLD AUTO: 15 %
MAGNESIUM SERPL-MCNC: 1.89 MG/DL (ref 1.6–2.4)
MCH RBC QN AUTO: 28.9 PG (ref 26–34)
MCHC RBC AUTO-ENTMCNC: 32.5 G/DL (ref 32–36)
MCV RBC AUTO: 89 FL (ref 80–100)
MONOCYTES # BLD AUTO: 0.42 X10*3/UL (ref 0.1–1)
MONOCYTES NFR BLD AUTO: 12.4 %
NEUTROPHILS # BLD AUTO: 2.33 X10*3/UL (ref 1.2–7.7)
NEUTROPHILS NFR BLD AUTO: 68.5 %
NRBC BLD-RTO: 0 /100 WBCS (ref 0–0)
PHOSPHATE SERPL-MCNC: 4.5 MG/DL (ref 2.5–4.9)
PLATELET # BLD AUTO: 80 X10*3/UL (ref 150–450)
POTASSIUM SERPL-SCNC: 4.3 MMOL/L (ref 3.5–5.3)
PROCALCITONIN SERPL-MCNC: 1.47 NG/ML
RBC # BLD AUTO: 3.15 X10*6/UL (ref 4.5–5.9)
SODIUM SERPL-SCNC: 134 MMOL/L (ref 136–145)
TACROLIMUS BLD-MCNC: <2 NG/ML
WBC # BLD AUTO: 3.4 X10*3/UL (ref 4.4–11.3)

## 2025-01-29 PROCEDURE — 99233 SBSQ HOSP IP/OBS HIGH 50: CPT | Performed by: NURSE PRACTITIONER

## 2025-01-29 PROCEDURE — 85025 COMPLETE CBC W/AUTO DIFF WBC: CPT

## 2025-01-29 PROCEDURE — 36415 COLL VENOUS BLD VENIPUNCTURE: CPT

## 2025-01-29 PROCEDURE — 99233 SBSQ HOSP IP/OBS HIGH 50: CPT | Performed by: INTERNAL MEDICINE

## 2025-01-29 PROCEDURE — 80069 RENAL FUNCTION PANEL: CPT

## 2025-01-29 PROCEDURE — 2500000001 HC RX 250 WO HCPCS SELF ADMINISTERED DRUGS (ALT 637 FOR MEDICARE OP)

## 2025-01-29 PROCEDURE — 2500000002 HC RX 250 W HCPCS SELF ADMINISTERED DRUGS (ALT 637 FOR MEDICARE OP, ALT 636 FOR OP/ED)

## 2025-01-29 PROCEDURE — 80197 ASSAY OF TACROLIMUS: CPT

## 2025-01-29 PROCEDURE — 2500000004 HC RX 250 GENERAL PHARMACY W/ HCPCS (ALT 636 FOR OP/ED): Performed by: INTERNAL MEDICINE

## 2025-01-29 PROCEDURE — 6350000001 HC RX 635 EPOETIN >10,000 UNITS: Mod: TB | Performed by: INTERNAL MEDICINE

## 2025-01-29 PROCEDURE — 83735 ASSAY OF MAGNESIUM: CPT

## 2025-01-29 PROCEDURE — 2500000004 HC RX 250 GENERAL PHARMACY W/ HCPCS (ALT 636 FOR OP/ED)

## 2025-01-29 PROCEDURE — 82947 ASSAY GLUCOSE BLOOD QUANT: CPT

## 2025-01-29 PROCEDURE — 71046 X-RAY EXAM CHEST 2 VIEWS: CPT

## 2025-01-29 PROCEDURE — 2500000005 HC RX 250 GENERAL PHARMACY W/O HCPCS

## 2025-01-29 PROCEDURE — 84145 PROCALCITONIN (PCT): CPT

## 2025-01-29 PROCEDURE — 1100000001 HC PRIVATE ROOM DAILY

## 2025-01-29 PROCEDURE — 71046 X-RAY EXAM CHEST 2 VIEWS: CPT | Performed by: RADIOLOGY

## 2025-01-29 RX ORDER — BENZONATATE 100 MG/1
100 CAPSULE ORAL 3 TIMES DAILY PRN
Status: DISCONTINUED | OUTPATIENT
Start: 2025-01-29 | End: 2025-02-05 | Stop reason: HOSPADM

## 2025-01-29 RX ORDER — ALBUTEROL SULFATE 90 UG/1
2 INHALANT RESPIRATORY (INHALATION) EVERY 6 HOURS PRN
Status: DISCONTINUED | OUTPATIENT
Start: 2025-01-29 | End: 2025-02-05 | Stop reason: HOSPADM

## 2025-01-29 RX ORDER — LEVOFLOXACIN 500 MG/1
500 TABLET, FILM COATED ORAL ONCE
Status: COMPLETED | OUTPATIENT
Start: 2025-01-29 | End: 2025-01-29

## 2025-01-29 RX ORDER — LEVOFLOXACIN 500 MG/1
250 TABLET, FILM COATED ORAL
Status: DISCONTINUED | OUTPATIENT
Start: 2025-01-31 | End: 2025-01-30

## 2025-01-29 RX ORDER — PARICALCITOL 5 UG/ML
10 INJECTION, SOLUTION INTRAVENOUS
Status: DISCONTINUED | OUTPATIENT
Start: 2025-01-30 | End: 2025-02-05 | Stop reason: HOSPADM

## 2025-01-29 RX ADMIN — SEVELAMER CARBONATE 800 MG: 800 TABLET, FILM COATED ORAL at 11:01

## 2025-01-29 RX ADMIN — HEPARIN SODIUM 5000 UNITS: 5000 INJECTION, SOLUTION INTRAVENOUS; SUBCUTANEOUS at 14:00

## 2025-01-29 RX ADMIN — TACROLIMUS 0.5 MG: 0.5 CAPSULE ORAL at 21:37

## 2025-01-29 RX ADMIN — ISOSORBIDE MONONITRATE 60 MG: 30 TABLET, EXTENDED RELEASE ORAL at 08:36

## 2025-01-29 RX ADMIN — VANCOMYCIN HYDROCHLORIDE 125 MG: KIT at 08:42

## 2025-01-29 RX ADMIN — KETOROLAC TROMETHAMINE 1 DROP: 5 SOLUTION OPHTHALMIC at 16:29

## 2025-01-29 RX ADMIN — KETOROLAC TROMETHAMINE 1 DROP: 5 SOLUTION OPHTHALMIC at 08:51

## 2025-01-29 RX ADMIN — CARVEDILOL 37.5 MG: 12.5 TABLET, FILM COATED ORAL at 08:36

## 2025-01-29 RX ADMIN — LEVOFLOXACIN 500 MG: 500 TABLET, FILM COATED ORAL at 10:59

## 2025-01-29 RX ADMIN — RENO CAPS 1 CAPSULE: 100; 1.5; 1.7; 20; 10; 1; 150; 5; 6 CAPSULE ORAL at 08:36

## 2025-01-29 RX ADMIN — HEPARIN SODIUM 5000 UNITS: 5000 INJECTION, SOLUTION INTRAVENOUS; SUBCUTANEOUS at 06:50

## 2025-01-29 RX ADMIN — ACETAMINOPHEN 975 MG: 325 TABLET ORAL at 16:21

## 2025-01-29 RX ADMIN — BRIMONIDINE TARTRATE 1 DROP: 2 SOLUTION/ DROPS OPHTHALMIC at 08:51

## 2025-01-29 RX ADMIN — INSULIN LISPRO 2 UNITS: 100 INJECTION, SOLUTION INTRAVENOUS; SUBCUTANEOUS at 07:45

## 2025-01-29 RX ADMIN — INSULIN LISPRO 6 UNITS: 100 INJECTION, SOLUTION INTRAVENOUS; SUBCUTANEOUS at 18:26

## 2025-01-29 RX ADMIN — CARVEDILOL 37.5 MG: 12.5 TABLET, FILM COATED ORAL at 21:37

## 2025-01-29 RX ADMIN — SEVELAMER CARBONATE 800 MG: 800 TABLET, FILM COATED ORAL at 06:50

## 2025-01-29 RX ADMIN — VANCOMYCIN HYDROCHLORIDE 125 MG: KIT at 21:36

## 2025-01-29 RX ADMIN — BRIMONIDINE TARTRATE 1 DROP: 2 SOLUTION/ DROPS OPHTHALMIC at 21:55

## 2025-01-29 RX ADMIN — CINACALCET HYDROCHLORIDE 30 MG: 30 TABLET, FILM COATED ORAL at 08:36

## 2025-01-29 RX ADMIN — NIFEDIPINE 90 MG: 90 TABLET, FILM COATED, EXTENDED RELEASE ORAL at 06:50

## 2025-01-29 RX ADMIN — INSULIN GLARGINE 15 UNITS: 100 INJECTION, SOLUTION SUBCUTANEOUS at 08:36

## 2025-01-29 RX ADMIN — PANTOPRAZOLE SODIUM 40 MG: 40 TABLET, DELAYED RELEASE ORAL at 06:50

## 2025-01-29 RX ADMIN — PRAVASTATIN SODIUM 10 MG: 10 TABLET ORAL at 21:37

## 2025-01-29 RX ADMIN — PREDNISONE 5 MG: 5 TABLET ORAL at 08:36

## 2025-01-29 RX ADMIN — VANCOMYCIN HYDROCHLORIDE 125 MG: KIT at 14:00

## 2025-01-29 RX ADMIN — VANCOMYCIN HYDROCHLORIDE 125 MG: KIT at 03:14

## 2025-01-29 RX ADMIN — ACETAMINOPHEN 975 MG: 325 TABLET ORAL at 07:45

## 2025-01-29 RX ADMIN — SEVELAMER CARBONATE 800 MG: 800 TABLET, FILM COATED ORAL at 16:21

## 2025-01-29 RX ADMIN — TACROLIMUS 0.5 MG: 0.5 CAPSULE ORAL at 08:36

## 2025-01-29 RX ADMIN — HEPARIN SODIUM 5000 UNITS: 5000 INJECTION, SOLUTION INTRAVENOUS; SUBCUTANEOUS at 21:37

## 2025-01-29 RX ADMIN — PSYLLIUM HUSK 1 PACKET: 3.4 POWDER ORAL at 14:00

## 2025-01-29 RX ADMIN — NEOMYCIN SULFATE, POLYMYXIN B SULFATE AND DEXAMETHASONE 1 DROP: 3.5; 10000; 1 SUSPENSION OPHTHALMIC at 16:33

## 2025-01-29 RX ADMIN — PIPERACILLIN SODIUM AND TAZOBACTAM SODIUM 2.25 G: 2; .25 INJECTION, SOLUTION INTRAVENOUS at 06:49

## 2025-01-29 RX ADMIN — EPOETIN ALFA-EPBX 5000 UNITS: 10000 INJECTION, SOLUTION INTRAVENOUS; SUBCUTANEOUS at 08:42

## 2025-01-29 RX ADMIN — KETOROLAC TROMETHAMINE 1 DROP: 5 SOLUTION OPHTHALMIC at 21:55

## 2025-01-29 RX ADMIN — NEOMYCIN SULFATE, POLYMYXIN B SULFATE AND DEXAMETHASONE 1 DROP: 3.5; 10000; 1 SUSPENSION OPHTHALMIC at 08:51

## 2025-01-29 RX ADMIN — PARICALCITOL 10 MCG: 5 INJECTION, SOLUTION INTRAVENOUS at 08:43

## 2025-01-29 RX ADMIN — PSYLLIUM HUSK 1 PACKET: 3.4 POWDER ORAL at 10:59

## 2025-01-29 ASSESSMENT — PAIN - FUNCTIONAL ASSESSMENT
PAIN_FUNCTIONAL_ASSESSMENT: 0-10

## 2025-01-29 ASSESSMENT — PAIN DESCRIPTION - DESCRIPTORS: DESCRIPTORS: ACHING

## 2025-01-29 ASSESSMENT — PAIN SCALES - GENERAL
PAINLEVEL_OUTOF10: 0 - NO PAIN
PAINLEVEL_OUTOF10: 3
PAINLEVEL_OUTOF10: 5 - MODERATE PAIN

## 2025-01-29 ASSESSMENT — ACTIVITIES OF DAILY LIVING (ADL): LACK_OF_TRANSPORTATION: NO

## 2025-01-29 NOTE — PROGRESS NOTES
"Physical Therapy                 Therapy Communication Note    Patient Name: Manolo Bashir  MRN: [id number]  Department: [facility] [address]  Room: [address] [place], [place] [address]  Today's Date: 1/29/2025     Discipline: Physical Therapy    PT Missed Visit: Yes     Missed Visit Reason: Missed Visit Reason: Patient refused (Pt refused stating, \"I'm not feeling good. I MIGHT feel better if you come back later.\")    Missed Time: Attempt    Comment: @8:23 AM- Pt refused, will attempt later as schedule permits.      Jordi Matthews, PT, DPT    "

## 2025-01-29 NOTE — PROGRESS NOTES
"Manolo Bashir is a 69 y.o. male on day 3 of admission presenting with Shortness of breath.    Subjective   Pt resting in bed. Denies sob, , fever, cough, chills, pain, chest pains, light head, dizziness, constipation, n/v has improved, cont with diarrhea       Objective     Physical Exam  Vitals and nursing note reviewed.   Cardiovascular:      Rate and Rhythm: Normal rate and regular rhythm.   Pulmonary:      Comments: Jeffrey lung sounds dim  POX 98% room air  Abdominal:      General: There is distension.      Comments: Non-tender to palpation   Genitourinary:     Comments: States make urine  Musculoskeletal:      Comments: Ble without edema   Skin:     General: Skin is warm and dry.   Neurological:      Mental Status: He is alert and oriented to person, place, and time.   Psychiatric:         Mood and Affect: Mood normal.         Behavior: Behavior normal.         Last Recorded Vitals  Blood pressure 120/63, pulse 70, temperature 36.5 °C (97.7 °F), temperature source Temporal, resp. rate 16, height 1.727 m (5' 8\"), weight 72.6 kg (160 lb), SpO2 98%.  Intake/Output last 3 Shifts:  I/O last 3 completed shifts:  In: 200 (2.8 mL/kg) [IV Piggyback:200]  Out: - (0 mL/kg)   Weight: 72.6 kg     Relevant Results  Scheduled medications  brimonidine, 1 drop, Right Eye, BID  bumetanide, 4 mg, intravenous, Once  carvedilol, 37.5 mg, oral, BID  cinacalcet, 30 mg, oral, Daily  [START ON 1/30/2025] epoetin aida or biosimilar, 5,000 Units, intravenous, Once per day on Tuesday Thursday Saturday  heparin (porcine), 5,000 Units, subcutaneous, q8h [facility]  imiquimod, 1 packet, Topical, Once per day on Monday Wednesday Friday  insulin glargine, 15 Units, subcutaneous, q AM  insulin lispro, 0-10 Units, subcutaneous, TID AC  isosorbide mononitrate ER, 60 mg, oral, Daily  ketorolac, 1 drop, Right Eye, TID  [START ON 1/31/2025] levoFLOXacin, 250 mg, oral, q48h  neomycin-polymyxin-dexAMETHasone, 1 drop, Right Eye, q8h ZOË  NIFEdipine ER, 90 mg, " oral, Daily before breakfast  pantoprazole, 40 mg, oral, Daily before breakfast  paricalcitol, 10 mcg, intravenous, Once per day on Monday Wednesday Friday  pravastatin, 10 mg, oral, Nightly  predniSONE, 5 mg, oral, Daily  sevelamer carbonate, 800 mg, oral, TID AC  tacrolimus, 0.5 mg, oral, BID  vancomycin, 125 mg, oral, q6h  vitamin B complex-vitamin C-folic acid, 1 capsule, oral, Daily      Continuous medications     PRN medications  PRN medications: acetaminophen, albuterol, benzonatate, dextrose, dextrose, glucagon, glucagon, oxygen, polyethylene glycol   Results for orders placed or performed during the hospital encounter of 01/26/25 (from the past 24 hours)   POCT GLUCOSE   Result Value Ref Range    POCT Glucose 182 (H) 74 - 99 mg/dL   POCT GLUCOSE   Result Value Ref Range    POCT Glucose 182 (H) 74 - 99 mg/dL   Magnesium   Result Value Ref Range    Magnesium 1.89 1.60 - 2.40 mg/dL   Renal Function Panel   Result Value Ref Range    Glucose 171 (H) 74 - 99 mg/dL    Sodium 134 (L) 136 - 145 mmol/L    Potassium 4.3 3.5 - 5.3 mmol/L    Chloride 95 (L) 98 - 107 mmol/L    Bicarbonate 28 21 - 32 mmol/L    Anion Gap 15 10 - 20 mmol/L    Urea Nitrogen 37 (H) 6 - 23 mg/dL    Creatinine 10.91 (H) 0.50 - 1.30 mg/dL    eGFR 5 (L) >60 mL/min/1.73m*2    Calcium 8.7 8.6 - 10.6 mg/dL    Phosphorus 4.5 2.5 - 4.9 mg/dL    Albumin 3.2 (L) 3.4 - 5.0 g/dL   CBC and Auto Differential   Result Value Ref Range    WBC 3.4 (L) 4.4 - 11.3 x10*3/uL    nRBC 0.0 0.0 - 0.0 /100 WBCs    RBC 3.15 (L) 4.50 - 5.90 x10*6/uL    Hemoglobin 9.1 (L) 13.5 - 17.5 g/dL    Hematocrit 28.0 (L) 41.0 - 52.0 %    MCV 89 80 - 100 fL    MCH 28.9 26.0 - 34.0 pg    MCHC 32.5 32.0 - 36.0 g/dL    RDW 16.5 (H) 11.5 - 14.5 %    Platelets 80 (L) 150 - 450 x10*3/uL    Neutrophils % 68.5 40.0 - 80.0 %    Immature Granulocytes %, Automated 0.6 0.0 - 0.9 %    Lymphocytes % 15.0 13.0 - 44.0 %    Monocytes % 12.4 2.0 - 10.0 %    Eosinophils % 3.2 0.0 - 6.0 %    Basophils %  0.3 0.0 - 2.0 %    Neutrophils Absolute 2.33 1.20 - 7.70 x10*3/uL    Immature Granulocytes Absolute, Automated 0.02 0.00 - 0.70 x10*3/uL    Lymphocytes Absolute 0.51 (L) 1.20 - 4.80 x10*3/uL    Monocytes Absolute 0.42 0.10 - 1.00 x10*3/uL    Eosinophils Absolute 0.11 0.00 - 0.70 x10*3/uL    Basophils Absolute 0.01 0.00 - 0.10 x10*3/uL   Tacrolimus level   Result Value Ref Range    Tacrolimus  <2.0 <=15.0 ng/mL   POCT GLUCOSE   Result Value Ref Range    POCT Glucose 158 (H) 74 - 99 mg/dL   POCT GLUCOSE   Result Value Ref Range    POCT Glucose 60 (L) 74 - 99 mg/dL   Procalcitonin   Result Value Ref Range    Procalcitonin 1.47 (H) <=0.07 ng/mL   POCT GLUCOSE   Result Value Ref Range    POCT Glucose 70 (L) 74 - 99 mg/dL     *Note: Due to a large number of results and/or encounters for the requested time period, some results have not been displayed. A complete set of results can be found in Results Review.                       Assessment/Plan   Assessment & Plan  Shortness of breath    Tolerated hemodialysis yesterday with net fluid loss 1.0L    Bp stable with tx, euvolemic on exam and has stable electrolytes . K+=4.3     Outpatient Dialysis schedule:    TTS [facility]/Dr Bridges     Access: lt fist with +thrill/bruit- no issues - able to achieve      Anemia of ESRD: epoetin aida (Procrit) injection 5,000 Units .. Current hgb 9.1.. will cont to monitor..( Mircera 150mcg q2wks op)       CKD-MBD Phosphate Binder:cinacalcet (Sensipar) tablet 30 mg daily, paricalcitoL (Zemplar) injection 10 mcg TTS, sevelamer carbonate (Renvela) tablet 800 mg tid ac, vitamin B complex-vitamin C-folic acid (Nephrocaps) capsule 1 capsule daily     Plan HD tomorrow with UF as tolerated     Renal diet      Please obtain daily standing wt (if possible)     Medication to be adjusted for ESRD      Patient to continue regular HD schedule while inpatient and to follow with the outpatient nephrologist at discharge          I spent 50    minutes in the professional and overall care of this patient.      Juhi Vázquez, ANÍBAL-CNP

## 2025-01-29 NOTE — PROGRESS NOTES
MEDICINE INPATIENT PROGRESS NOTE    Manolo Bashir is a 69 y.o. male on hospital day 3 who presented with Shortness of breath    Subjective   NAEO.    Patient seen this morning resting comfortably in bed. Reports that cough has continued today, still nonproductive, however has not been getting his Tessalon purls. Reports continued bowel movements, still loose and watery. Also reports continued headache that is responsive to Tylenol, has not improved much since admission. Otherwise denies nausea/vomiting, chest pain, SOB, and abdominal pain except over his transplanted kidney in the LLQ. No additional questions or concerns at this time.           Objective     Last Recorded Vitals  Vitals:    01/28/25 1929 01/29/25 0458 01/29/25 0900 01/29/25 1328   BP: 134/65 167/69 165/76 120/63   Pulse: 73 79 82 70   Resp:  16     Temp: 37.1 °C (98.8 °F) 36.8 °C (98.2 °F)  36.5 °C (97.7 °F)   TempSrc:       SpO2: 99% 96%  98%   Weight:       Height:           Intake/Output  No intake/output data recorded.    Physical Exam  Constitutional: Patient does not appear to be in any acute distress, Aox4  Cardio: RRR, S1/S2, systolic murmur heard best over LUSB  Pulm: CTAB no wheezes, normal respiratory effort  GI: +BS, soft, tender over LLQ (transplanted kidney) nondistended, no guarding or rebound, no masses noted  MSK: No joint swelling, multiple aneurysms and AV fistula noted on LUE  Skin: No lesions, contusions, or erythema.  Extremities: no BLE swelling   Neuro: No focal deficits  Psych: Appropriate mood and behavior    Labs    CBC  Results from last 7 days   Lab Units 01/29/25  0629 01/28/25  0550 01/27/25  0653 01/26/25  0622   HEMOGLOBIN g/dL 9.1* 8.8* 8.9* 10.4*   HEMATOCRIT % 28.0* 28.8* 27.1* 32.1*   WBC AUTO x10*3/uL 3.4* 3.0* 3.6* 4.9   PLATELETS AUTO x10*3/uL 80* 59* 55* 57*      BMP  Results from last 7 days   Lab Units 01/29/25  0629 01/28/25  0550 01/27/25  0653 01/26/25  0622   SODIUM mmol/L 134* 136 136 135*  "  POTASSIUM mmol/L 4.3 4.2 5.2 5.2   CHLORIDE mmol/L 95* 98 98 96*   CO2 mmol/L 28 29 29 29   BUN mg/dL 37* 27* 44* 30*   CREATININE mg/dL 10.91* 8.41* 11.07* 7.88*   MAGNESIUM mg/dL 1.89 1.95 1.94 1.81   PHOSPHORUS mg/dL 4.5 4.4 6.7* 5.0*   ANION GAP mmol/L 15 13 14 15   GLUCOSE mg/dL 171* 109* 77 208*   CALCIUM mg/dL 8.7 8.5* 8.4* 8.9     LFTS  Results from last 7 days   Lab Units 01/26/25  0622   ALT U/L 11   AST U/L 32   ALK PHOS U/L 70   BILIRUBIN TOTAL mg/dL 0.6     COAGS        No lab exists for component: \"PT\"     Micro  No results found for the last 90 days.     Imaging  XR chest 2 views    Result Date: 1/26/2025  1.  Mild central pulmonary vascular congestion with patchy perihilar/bibasilar airspace opacities. Recommend correlation with patient's volume status as findings could reflect interstitial edema. Superimposed infectious process is not definitively excluded. 2. Small bilateral pleural effusions.   I personally reviewed the images/study and I agree with the findings as stated by resident Jean Rowland. This study was interpreted at [facility], [place], [place].   MACRO: None   Signed by: James Prasad 1/26/2025 9:31 AM Dictation workstation:   [id number]    CT abdomen pelvis wo IV contrast    Result Date: 1/5/2025  1. Interval development of marked fat stranding adjacent to the left iliac fossa renal transplant which appears edematous and heterogeneous, new compared to 09/10/2024 CT. No perinephric fluid collections. Findings concerning for urinary tract infection/pyelonephritis. Correlation with urinalysis is recommended. 2. Slight interval decrease in mild right pleural effusion with interval resolution of previously noted small left pleural effusion. Persistent bilateral ground-glass opacities with mild interlobular septal thickening, likely pulmonary edema. 3. Mild splenomegaly measuring up to 13.5 cm in craniocaudal dimension, which has improved " compared to 14.5 cm on 09/10/2024 CT. 4. Additional findings as described above.   I personally reviewed the images/study and I agree with the findings as stated by Kendrick Purdy MD. This study was interpreted at [facility], .   MACRO: None.   Signed by: Hue Aguilar 1/5/2025 2:17 PM Dictation workstation:   [id number]      Medications   Scheduled Medications  brimonidine, 1 drop, Right Eye, BID  bumetanide, 4 mg, intravenous, Once  carvedilol, 37.5 mg, oral, BID  cinacalcet, 30 mg, oral, Daily  [START ON 1/30/2025] epoetin aida or biosimilar, 5,000 Units, intravenous, Once per day on Tuesday Thursday Saturday  heparin (porcine), 5,000 Units, subcutaneous, q8h ZOË  imiquimod, 1 packet, Topical, Once per day on Monday Wednesday Friday  insulin glargine, 15 Units, subcutaneous, q AM  insulin lispro, 0-10 Units, subcutaneous, TID AC  isosorbide mononitrate ER, 60 mg, oral, Daily  ketorolac, 1 drop, Right Eye, TID  [START ON 1/31/2025] levoFLOXacin, 250 mg, oral, q48h  neomycin-polymyxin-dexAMETHasone, 1 drop, Right Eye, q8h ZOË  NIFEdipine ER, 90 mg, oral, Daily before breakfast  pantoprazole, 40 mg, oral, Daily before breakfast  paricalcitol, 10 mcg, intravenous, Once per day on Monday Wednesday Friday  pravastatin, 10 mg, oral, Nightly  predniSONE, 5 mg, oral, Daily  psyllium, 1 packet, oral, TID  sevelamer carbonate, 800 mg, oral, TID AC  tacrolimus, 0.5 mg, oral, BID  vancomycin, 125 mg, oral, q6h  vitamin B complex-vitamin C-folic acid, 1 capsule, oral, Daily     Continuous Medications    PRN Medications  PRN medications: acetaminophen, albuterol, benzonatate, dextrose, dextrose, glucagon, glucagon, oxygen, polyethylene glycol          Assessment/Plan   Manolo Bashir is a 69 y.o. male with a PMH of ESRD (on dialysis, TTa schedule, most recent dialysis 1/25), c/b impaired allograft function currently on immunosuppression (prednisone, tacrolimus), IgG and IgA  lambda MGUS (BM biopsy 10/23 without evidence of myeloma), T2DM (on 20 units Lantus in AM, Lispro 3 units TID + SSI), HTN, prostate cancer (s/p radical prostatectomy 10/2013), HCV (s/p treatment, PCR negative 10/2023), and gastroparesis who presents for 1 day history of SOB, nausea/vomiting, diarrhea, and general malaise that started during dialysis on 1/25. Presentation is concerning for fluid overload 2/2 ESRD, however cannot rule out cardiac etiologies of pulmonary edema. Also, given the patient's fever, history of immunocompromise, and elevated procalcitonin, there is concern for potential underlying pneumonia so he is being treated for nosocomial pneumonia. Additionally, C. Diff PCR was positive (although toxin negative) but will continue to treat given significant risk factors.     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 Pneumonia, improved  - Patient hypoxic to 88% on RA on admission, required 3L supplemental O2 briefly in ED, was weaned off  - CXR on admission with evidence of pulmonary vascular congestion, pulmonary edema and pleural effusions, cannot rule out potential infiltrate  - Patient febrile on admission to 38.4  - Hx of ESRD 2/2 HTN s/p 2 allographic kidney transplants c/b chronic rejection, dialysis TThSa, has not missed dialysis sessions  - Most recent TTE 4/4/24, EF 54%, dilated/hypertrophic left ventricle, dilated LA/RA, mild to moderate aortic regurgitation  - COVID/Flu/RSV negative, other viral URI  studies negative so far  - QTc on admission 450 ms  -Procal elevated to 0.95  - Troponin trend 65 -> 87 -> 86  -MRSA nares negative 1/26  -TTE 1/28 without significant change from prior  PLAN  - Transplant nephrology consulted, appreciate recommendations                - Additional dialysis scheduled for 1/27  - Continue prednisone 5 mg daily  - Continue tacrolimus 0.5 mg BID  - Per nephrology:                - Started DARIANA                - Stopped calcitriol, on parenteral zemplar OP                - Started sevelamer 800 mg TID  - D/C azithromycin  - D/C zosyn, start Levofloxacin 500 mg today then 250 mg every other day  - Tessalon Purls and albuterol nebs PRN for cough  - CTM daily RFP, CBC  - Supplemental O2 PRN, goal SpO2 > 92%  - Strict I/Os  - Daily standing weights as tolerated     #C. diff infection, recurrent  #Gastroparesis  #Nausea and vomiting, improved  #Diarrhea, improving  #c/f gastroenteritis  - C. Diff PCR +, toxin negative, however given risk factors of immunocompromise, recent abx, and frequent hospital/health care setting visits, will treat  - Hx of C. Diff in 2024, treated with PO vancomycin  PLAN  - Continue PO vancomycin 125 mg q6h x 10 days (through 2/6)  - Stool O/P ordered, pending  - Giardia, cryptosporidium antigen pending  - Consider restarting metoclopramide if nausea/vomiting recur  - CTM stool output and abdominal exams  - Orthostatic vitals ordered, will consider fluid bolus     #IgG and IgA lambda MGUS  #Thrombocytopenia  - Thrombocytopenia chronic, however baseline ~90s, may be decreased in the setting of viral infection vs. Immunosuppression vs. MGUS  - 4T score 3, did receive heparin on prior admission 1/5-1/8 but low suspicion for HIT  PLAN  - CTM on daily CBC  - Continue home cinacalcet 30 mg daily     #Hx of recent cataract surgery  - Continue home eye drops:                - Maxitrol 1 drop R eye q8h                - brimonidine 2% solution 1 drop R eye BID                -  ketorolac 0.5% solution 1 drop R eye q8h     #HTN  - Continue home nifedipine 90 mg daily  - Continue home carvedilol 25 mg BID  - Continue home Imdur 60 mg daily  - CTM, may restart home medications if becomes hypertensive     #T2DM  - Most recent A1c 9.1 12/16/24  PLAN  - Continue Lantus 15 units in AM + #2 SSI     #HLD  - Continue home pravastatin 10 mg daily     #GERD  - Continue home pantoprazole 40 mg daily    F: Replete PRN  E: Replete PRN  N: Adult diet Phosphorus restricted 1 gm   A: PIV    Oxygen: None  Abx: levoFLOXacin - 500 mg  neomycin-polymyxin-dexAMETHasone - 3.5mg/mL-10,000 unit/mL-0.1 %, 3.5mg/mL-10,000 unit/mL-0.1 %  sulfamethoxazole-trimethoprim - 800-160 mg  vancomycin - 50 mg/mL     DVT Ppx: Heparin SubQ  GI Ppx: Pantoprazole  GI Laxative: Miralax     Code Status: full (confirmed on admission)  Surrogate Medical Decision-maker:  Amalia Enriquez ([phone])          Renay Cheung, MS4  Please Haiku with any questions or concerns.

## 2025-01-29 NOTE — CARE PLAN
The clinical goals for the shift include Pt will remain HDS throughout shift    Problem: Diabetes  Goal: Achieve decreasing blood glucose levels by end of shift  Outcome: Progressing  Goal: Increase stability of blood glucose readings by end of shift  Outcome: Progressing  Goal: Decrease in ketones present in urine by end of shift  Outcome: Progressing  Goal: Maintain electrolyte levels within acceptable range throughout shift  Outcome: Progressing  Goal: Maintain glucose levels >70mg/dl to <250mg/dl throughout shift  Outcome: Progressing  Goal: No changes in neurological exam by end of shift  Outcome: Progressing  Goal: Learn about and adhere to nutrition recommendations by end of shift  Outcome: Progressing  Goal: Vital signs within normal range for age by end of shift  Outcome: Progressing  Goal: Increase self care and/or family involovement by end of shift  Outcome: Progressing  Goal: Receive DSME education by end of shift  Outcome: Progressing     Problem: Pain - Adult  Goal: Verbalizes/displays adequate comfort level or baseline comfort level  Outcome: Progressing     Problem: Safety - Adult  Goal: Free from fall injury  Outcome: Progressing     Problem: Discharge Planning  Goal: Discharge to home or other facility with appropriate resources  Outcome: Progressing     Problem: Chronic Conditions and Co-morbidities  Goal: Patient's chronic conditions and co-morbidity symptoms are monitored and maintained or improved  Outcome: Progressing     Problem: Nutrition  Goal: Nutrient intake appropriate for maintaining nutritional needs  Outcome: Progressing

## 2025-01-29 NOTE — CARE PLAN
Problem: Diabetes  Goal: Achieve decreasing blood glucose levels by end of shift  Outcome: Progressing  Goal: Increase stability of blood glucose readings by end of shift  Outcome: Progressing  Goal: Decrease in ketones present in urine by end of shift  Outcome: Progressing  Goal: Maintain electrolyte levels within acceptable range throughout shift  Outcome: Progressing  Goal: Maintain glucose levels >70mg/dl to <250mg/dl throughout shift  Outcome: Progressing  Goal: No changes in neurological exam by end of shift  Outcome: Progressing  Goal: Learn about and adhere to nutrition recommendations by end of shift  Outcome: Progressing  Goal: Vital signs within normal range for age by end of shift  Outcome: Progressing  Goal: Increase self care and/or family involovement by end of shift  Outcome: Progressing  Goal: Receive DSME education by end of shift  Outcome: Progressing     Problem: Pain - Adult  Goal: Verbalizes/displays adequate comfort level or baseline comfort level  Outcome: Progressing     Problem: Safety - Adult  Goal: Free from fall injury  Outcome: Progressing   The patient's goals for the shift include      The clinical goals for the shift include Pt will remain HDS throughout shift

## 2025-01-29 NOTE — PROGRESS NOTES
01/29/25 1023   Discharge Planning   Living Arrangements Children  (Home with dtr Purnima.)   Support Systems Children;Friends/neighbors;Family members   Assistance Needed None   Type of Residence Private residence   Do you have animals or pets at home? No   Who is requesting discharge planning? Patient   Home or Post Acute Services None   Expected Discharge Disposition Home   Does the patient need discharge transport arranged? No  (Pt's family will provide.)   RoundTrip coordination needed? No   Has discharge transport been arranged? No   Patient expects to be discharged to: Home   Financial Resource Strain   How hard is it for you to pay for the very basics like food, housing, medical care, and heating? Not hard   Housing Stability   In the last 12 months, was there a time when you were not able to pay the mortgage or rent on time? N   At any time in the past 12 months, were you homeless or living in a shelter (including now)? N   Transportation Needs   In the past 12 months, has lack of transportation kept you from medical appointments or from getting medications? no   In the past 12 months, has lack of transportation kept you from meetings, work, or from getting things needed for daily living? No   Intensity of Service   Intensity of Service 0-30 min     Assessment Note:  Met with pt and introduced myself as care coordinator and member of the Care Transitions team for discharge planning.   Pt feels safe at home.  Pt was independent prior to admission.  Pt's family provide transport to [facility].  Pt's address, phone number and contact information was verified.  Pt does not have any questions/concerns at this time.     Previous Home Care: None  DME: Glucometer.  Pt also has a cane, wheeled walker and shower chair but he is not currently using these items.  Pharmacy: [facility].  Pt does not have any problems affording his medications.  Falls: Denies  PCP:   Arya Hutton (last visit was 2 months  ago).  Dialysis:  [facility] on TTS at 6:30am.  Pt's family provides transport.    Readmission:  Pt was recently admitted 1/5-1/8 with severe kidney pain and hematuria.  Pt was discharged home.  Team is aware of the previous admission.    Linh Bueno MSN, RN-BC  Transitional Care Coordinator (TCC)  [phone]

## 2025-01-30 ENCOUNTER — APPOINTMENT (OUTPATIENT)
Dept: DIALYSIS | Facility: HOSPITAL | Age: 70
End: 2025-01-30
Payer: MEDICARE

## 2025-01-30 ENCOUNTER — APPOINTMENT (OUTPATIENT)
Facility: HOSPITAL | Age: 70
End: 2025-01-30
Payer: MEDICARE

## 2025-01-30 ENCOUNTER — APPOINTMENT (OUTPATIENT)
Dept: RADIOLOGY | Facility: HOSPITAL | Age: 70
DRG: 640 | End: 2025-01-30
Payer: MEDICARE

## 2025-01-30 LAB
ALBUMIN SERPL BCP-MCNC: 3.1 G/DL (ref 3.4–5)
ANION GAP SERPL CALC-SCNC: 16 MMOL/L (ref 10–20)
BACTERIA BLD CULT: NORMAL
BACTERIA BLD CULT: NORMAL
BASOPHILS # BLD AUTO: 0.02 X10*3/UL (ref 0–0.1)
BASOPHILS NFR BLD AUTO: 0.5 %
BUN SERPL-MCNC: 43 MG/DL (ref 6–23)
CALCIUM SERPL-MCNC: 8.9 MG/DL (ref 8.6–10.6)
CHLORIDE SERPL-SCNC: 95 MMOL/L (ref 98–107)
CO2 SERPL-SCNC: 26 MMOL/L (ref 21–32)
CREAT SERPL-MCNC: 14.1 MG/DL (ref 0.5–1.3)
EGFRCR SERPLBLD CKD-EPI 2021: 3 ML/MIN/1.73M*2
EOSINOPHIL # BLD AUTO: 0.11 X10*3/UL (ref 0–0.7)
EOSINOPHIL NFR BLD AUTO: 2.7 %
ERYTHROCYTE [DISTWIDTH] IN BLOOD BY AUTOMATED COUNT: 16.7 % (ref 11.5–14.5)
GLUCOSE BLD MANUAL STRIP-MCNC: 109 MG/DL (ref 74–99)
GLUCOSE BLD MANUAL STRIP-MCNC: 250 MG/DL (ref 74–99)
GLUCOSE BLD MANUAL STRIP-MCNC: 280 MG/DL (ref 74–99)
GLUCOSE SERPL-MCNC: 148 MG/DL (ref 74–99)
HCT VFR BLD AUTO: 27.2 % (ref 41–52)
HGB BLD-MCNC: 8.8 G/DL (ref 13.5–17.5)
IMM GRANULOCYTES # BLD AUTO: 0.01 X10*3/UL (ref 0–0.7)
IMM GRANULOCYTES NFR BLD AUTO: 0.2 % (ref 0–0.9)
LYMPHOCYTES # BLD AUTO: 0.66 X10*3/UL (ref 1.2–4.8)
LYMPHOCYTES NFR BLD AUTO: 16.2 %
MAGNESIUM SERPL-MCNC: 1.76 MG/DL (ref 1.6–2.4)
MCH RBC QN AUTO: 28.7 PG (ref 26–34)
MCHC RBC AUTO-ENTMCNC: 32.4 G/DL (ref 32–36)
MCV RBC AUTO: 89 FL (ref 80–100)
MONOCYTES # BLD AUTO: 0.41 X10*3/UL (ref 0.1–1)
MONOCYTES NFR BLD AUTO: 10 %
NEUTROPHILS # BLD AUTO: 2.87 X10*3/UL (ref 1.2–7.7)
NEUTROPHILS NFR BLD AUTO: 70.4 %
NRBC BLD-RTO: 0 /100 WBCS (ref 0–0)
PHOSPHATE SERPL-MCNC: 4 MG/DL (ref 2.5–4.9)
PLATELET # BLD AUTO: 82 X10*3/UL (ref 150–450)
POTASSIUM SERPL-SCNC: 5.1 MMOL/L (ref 3.5–5.3)
RBC # BLD AUTO: 3.07 X10*6/UL (ref 4.5–5.9)
SODIUM SERPL-SCNC: 132 MMOL/L (ref 136–145)
TACROLIMUS BLD-MCNC: <2 NG/ML
WBC # BLD AUTO: 4.1 X10*3/UL (ref 4.4–11.3)

## 2025-01-30 PROCEDURE — 85025 COMPLETE CBC W/AUTO DIFF WBC: CPT

## 2025-01-30 PROCEDURE — 90935 HEMODIALYSIS ONE EVALUATION: CPT | Performed by: INTERNAL MEDICINE

## 2025-01-30 PROCEDURE — 74176 CT ABD & PELVIS W/O CONTRAST: CPT | Performed by: STUDENT IN AN ORGANIZED HEALTH CARE EDUCATION/TRAINING PROGRAM

## 2025-01-30 PROCEDURE — 2500000004 HC RX 250 GENERAL PHARMACY W/ HCPCS (ALT 636 FOR OP/ED)

## 2025-01-30 PROCEDURE — 2500000002 HC RX 250 W HCPCS SELF ADMINISTERED DRUGS (ALT 637 FOR MEDICARE OP, ALT 636 FOR OP/ED)

## 2025-01-30 PROCEDURE — 2500000004 HC RX 250 GENERAL PHARMACY W/ HCPCS (ALT 636 FOR OP/ED): Performed by: NURSE PRACTITIONER

## 2025-01-30 PROCEDURE — 87040 BLOOD CULTURE FOR BACTERIA: CPT

## 2025-01-30 PROCEDURE — 36415 COLL VENOUS BLD VENIPUNCTURE: CPT

## 2025-01-30 PROCEDURE — 80069 RENAL FUNCTION PANEL: CPT

## 2025-01-30 PROCEDURE — 1100000001 HC PRIVATE ROOM DAILY

## 2025-01-30 PROCEDURE — 80197 ASSAY OF TACROLIMUS: CPT

## 2025-01-30 PROCEDURE — 99221 1ST HOSP IP/OBS SF/LOW 40: CPT | Performed by: INTERNAL MEDICINE

## 2025-01-30 PROCEDURE — 6350000001 HC RX 635 EPOETIN >10,000 UNITS: Mod: TB | Performed by: NURSE PRACTITIONER

## 2025-01-30 PROCEDURE — 2500000005 HC RX 250 GENERAL PHARMACY W/O HCPCS

## 2025-01-30 PROCEDURE — 82947 ASSAY GLUCOSE BLOOD QUANT: CPT

## 2025-01-30 PROCEDURE — 2500000004 HC RX 250 GENERAL PHARMACY W/ HCPCS (ALT 636 FOR OP/ED): Performed by: INTERNAL MEDICINE

## 2025-01-30 PROCEDURE — 83735 ASSAY OF MAGNESIUM: CPT

## 2025-01-30 PROCEDURE — 99233 SBSQ HOSP IP/OBS HIGH 50: CPT | Performed by: INTERNAL MEDICINE

## 2025-01-30 PROCEDURE — 2500000001 HC RX 250 WO HCPCS SELF ADMINISTERED DRUGS (ALT 637 FOR MEDICARE OP)

## 2025-01-30 PROCEDURE — 74176 CT ABD & PELVIS W/O CONTRAST: CPT

## 2025-01-30 PROCEDURE — 99222 1ST HOSP IP/OBS MODERATE 55: CPT | Performed by: TRANSPLANT SURGERY

## 2025-01-30 RX ORDER — PREDNISONE 5 MG/1
5 TABLET ORAL DAILY
Status: DISCONTINUED | OUTPATIENT
Start: 2025-02-05 | End: 2025-02-05 | Stop reason: HOSPADM

## 2025-01-30 RX ORDER — PREDNISONE 20 MG/1
40 TABLET ORAL DAILY
Status: COMPLETED | OUTPATIENT
Start: 2025-01-30 | End: 2025-01-31

## 2025-01-30 RX ORDER — PREDNISONE 5 MG/1
10 TABLET ORAL DAILY
Status: COMPLETED | OUTPATIENT
Start: 2025-02-03 | End: 2025-02-04

## 2025-01-30 RX ORDER — PREDNISONE 20 MG/1
20 TABLET ORAL DAILY
Status: COMPLETED | OUTPATIENT
Start: 2025-02-01 | End: 2025-02-02

## 2025-01-30 RX ORDER — HYDRALAZINE HYDROCHLORIDE 10 MG/1
10 TABLET, FILM COATED ORAL 2 TIMES DAILY PRN
Status: CANCELLED | OUTPATIENT
Start: 2025-01-30

## 2025-01-30 RX ADMIN — EPOETIN ALFA-EPBX 5000 UNITS: 10000 INJECTION, SOLUTION INTRAVENOUS; SUBCUTANEOUS at 18:15

## 2025-01-30 RX ADMIN — PREDNISONE 40 MG: 20 TABLET ORAL at 18:15

## 2025-01-30 RX ADMIN — PRAVASTATIN SODIUM 10 MG: 10 TABLET ORAL at 22:59

## 2025-01-30 RX ADMIN — PARICALCITOL 10 MCG: 5 INJECTION, SOLUTION INTRAVENOUS at 16:49

## 2025-01-30 RX ADMIN — HEPARIN SODIUM 5000 UNITS: 5000 INJECTION, SOLUTION INTRAVENOUS; SUBCUTANEOUS at 16:49

## 2025-01-30 RX ADMIN — CARVEDILOL 37.5 MG: 12.5 TABLET, FILM COATED ORAL at 07:55

## 2025-01-30 RX ADMIN — ACETAMINOPHEN 975 MG: 325 TABLET ORAL at 07:42

## 2025-01-30 RX ADMIN — TACROLIMUS 0.5 MG: 0.5 CAPSULE ORAL at 22:59

## 2025-01-30 RX ADMIN — NEOMYCIN SULFATE, POLYMYXIN B SULFATE AND DEXAMETHASONE 1 DROP: 3.5; 10000; 1 SUSPENSION OPHTHALMIC at 01:22

## 2025-01-30 RX ADMIN — NEOMYCIN SULFATE, POLYMYXIN B SULFATE AND DEXAMETHASONE 1 DROP: 3.5; 10000; 1 SUSPENSION OPHTHALMIC at 12:15

## 2025-01-30 RX ADMIN — PANTOPRAZOLE SODIUM 40 MG: 40 TABLET, DELAYED RELEASE ORAL at 12:03

## 2025-01-30 RX ADMIN — VANCOMYCIN HYDROCHLORIDE 125 MG: KIT at 18:15

## 2025-01-30 RX ADMIN — SEVELAMER CARBONATE 800 MG: 800 TABLET, FILM COATED ORAL at 12:02

## 2025-01-30 RX ADMIN — CARVEDILOL 37.5 MG: 12.5 TABLET, FILM COATED ORAL at 22:59

## 2025-01-30 RX ADMIN — HEPARIN SODIUM 5000 UNITS: 5000 INJECTION, SOLUTION INTRAVENOUS; SUBCUTANEOUS at 05:38

## 2025-01-30 RX ADMIN — BRIMONIDINE TARTRATE 1 DROP: 2 SOLUTION/ DROPS OPHTHALMIC at 23:07

## 2025-01-30 RX ADMIN — ALBUTEROL SULFATE 2 PUFF: 90 AEROSOL, METERED RESPIRATORY (INHALATION) at 03:48

## 2025-01-30 RX ADMIN — BENZONATATE 100 MG: 100 CAPSULE ORAL at 03:48

## 2025-01-30 RX ADMIN — INSULIN GLARGINE 15 UNITS: 100 INJECTION, SOLUTION SUBCUTANEOUS at 12:05

## 2025-01-30 RX ADMIN — SEVELAMER CARBONATE 800 MG: 800 TABLET, FILM COATED ORAL at 16:49

## 2025-01-30 RX ADMIN — HEPARIN SODIUM 5000 UNITS: 5000 INJECTION, SOLUTION INTRAVENOUS; SUBCUTANEOUS at 22:59

## 2025-01-30 RX ADMIN — INSULIN LISPRO 4 UNITS: 100 INJECTION, SOLUTION INTRAVENOUS; SUBCUTANEOUS at 18:16

## 2025-01-30 RX ADMIN — BRIMONIDINE TARTRATE 1 DROP: 2 SOLUTION/ DROPS OPHTHALMIC at 12:15

## 2025-01-30 RX ADMIN — PREDNISONE 5 MG: 5 TABLET ORAL at 12:03

## 2025-01-30 RX ADMIN — NEOMYCIN SULFATE, POLYMYXIN B SULFATE AND DEXAMETHASONE 1 DROP: 3.5; 10000; 1 SUSPENSION OPHTHALMIC at 23:07

## 2025-01-30 RX ADMIN — TACROLIMUS 0.5 MG: 0.5 CAPSULE ORAL at 12:03

## 2025-01-30 RX ADMIN — ISOSORBIDE MONONITRATE 60 MG: 30 TABLET, EXTENDED RELEASE ORAL at 12:04

## 2025-01-30 RX ADMIN — NIFEDIPINE 90 MG: 90 TABLET, FILM COATED, EXTENDED RELEASE ORAL at 05:38

## 2025-01-30 RX ADMIN — BENZONATATE 100 MG: 100 CAPSULE ORAL at 22:59

## 2025-01-30 RX ADMIN — HYDROMORPHONE HYDROCHLORIDE 0.4 MG: 0.5 INJECTION, SOLUTION INTRAMUSCULAR; INTRAVENOUS; SUBCUTANEOUS at 04:14

## 2025-01-30 RX ADMIN — VANCOMYCIN HYDROCHLORIDE 125 MG: KIT at 12:02

## 2025-01-30 RX ADMIN — KETOROLAC TROMETHAMINE 1 DROP: 5 SOLUTION OPHTHALMIC at 12:15

## 2025-01-30 RX ADMIN — PIPERACILLIN SODIUM AND TAZOBACTAM SODIUM 2.25 G: 2; .25 INJECTION, SOLUTION INTRAVENOUS at 16:49

## 2025-01-30 RX ADMIN — RENO CAPS 1 CAPSULE: 100; 1.5; 1.7; 20; 10; 1; 150; 5; 6 CAPSULE ORAL at 12:03

## 2025-01-30 RX ADMIN — VANCOMYCIN HYDROCHLORIDE 125 MG: KIT at 23:00

## 2025-01-30 RX ADMIN — CINACALCET HYDROCHLORIDE 30 MG: 30 TABLET, FILM COATED ORAL at 12:03

## 2025-01-30 RX ADMIN — VANCOMYCIN HYDROCHLORIDE 125 MG: KIT at 03:21

## 2025-01-30 RX ADMIN — KETOROLAC TROMETHAMINE 1 DROP: 5 SOLUTION OPHTHALMIC at 23:07

## 2025-01-30 ASSESSMENT — COGNITIVE AND FUNCTIONAL STATUS - GENERAL
MOBILITY SCORE: 23
DRESSING REGULAR LOWER BODY CLOTHING: A LITTLE
CLIMB 3 TO 5 STEPS WITH RAILING: A LITTLE
HELP NEEDED FOR BATHING: A LITTLE
DAILY ACTIVITIY SCORE: 22

## 2025-01-30 ASSESSMENT — ENCOUNTER SYMPTOMS
CARDIOVASCULAR NEGATIVE: 1
ABDOMINAL PAIN: 1
EYES NEGATIVE: 1
MUSCULOSKELETAL NEGATIVE: 1
COUGH: 1
FEVER: 1
FATIGUE: 1

## 2025-01-30 ASSESSMENT — PAIN SCALES - GENERAL
PAINLEVEL_OUTOF10: 0 - NO PAIN
PAINLEVEL_OUTOF10: 0 - NO PAIN
PAINLEVEL_OUTOF10: 4
PAINLEVEL_OUTOF10: 0 - NO PAIN
PAINLEVEL_OUTOF10: 10 - WORST POSSIBLE PAIN

## 2025-01-30 ASSESSMENT — PAIN DESCRIPTION - ORIENTATION: ORIENTATION: LEFT

## 2025-01-30 ASSESSMENT — PAIN DESCRIPTION - LOCATION
LOCATION: ABDOMEN
LOCATION: HEAD

## 2025-01-30 ASSESSMENT — PAIN - FUNCTIONAL ASSESSMENT: PAIN_FUNCTIONAL_ASSESSMENT: NO/DENIES PAIN

## 2025-01-30 ASSESSMENT — PAIN SCALES - WONG BAKER: WONGBAKER_NUMERICALRESPONSE: HURTS LITTLE BIT

## 2025-01-30 NOTE — NURSING NOTE
Report to Receiving RN:    Report To: Yoly  Time Report Called: [id number]  Hand-Off Communication: Pt completed full dialysis tx with no issues. 2.4 L of fluid removed, /81, HR 81, Coreg and Tylenol given.   Complications During Treatment: No  Ultrafiltration Treatment: Yes  Medications Administered During Dialysis: Yes   Blood Products Administered During Dialysis: No  Labs Sent During Dialysis: Yes  Heparin Drip Rate Changes: N/A  Dialysis Catheter Dressing: na  Last Dressing Change: na      Last Updated: 10:42 AM by GORAN SOLOMON

## 2025-01-30 NOTE — PROGRESS NOTES
Physical Therapy                 Therapy Communication Note    Patient Name: Manolo Bashir  MRN: [id number]  Department: [facility] DIALYSIS  Room: [facility]-A  Today's Date: 1/30/2025     Discipline: Physical Therapy    PT Missed Visit: Yes     Missed Visit Reason: Missed Visit Reason:  (@1031 off the floor at dialysis)    Missed Time: Attempt      01/30/25 at 11:18 AM - Alexandra Nieves, PT

## 2025-01-30 NOTE — CONSULTS
Transplant Surgery Consult Note    Subjective   Chief Complaint/Reason for Consult: Consult for nephrectomy of L renal transplant     HPI:  Manolo Bashir is a 69 y.o. male with past medical history of ESRD (s/p two kidney transplants: initially in 1992 c/b allograft dysfunction, and again in 2013, c/b impaired allograft function, presently on IHD since May 2024; on tacro, pred), MGUS, type 2 diabetes, hypertension, prostate cancer, HCV who presents with a 1 day history of shortness of breath, cough, nausea/vomiting, diarrhea and general malaise that began during his dialysis treatment on 1/25.   He has been currently managed for acute hypoxic respiratory failure secondary to possible volume overload vs. Pneumonia, on vanc and levaquin.   Earlier this evening, patient began having severe pain in his left lower quadrant as well as a severe headache and an episode of hematuria.   He states that he has had several such episodes over the past few weeks, but this has been the most severe. Transplant surgery was consulted for evaluation of nephrectomy in setting of impaired graft function and persistent LLQ pain.     A 12-point ROS was performed and was unremarkable except as above.    PMH:  Past Medical History:   Diagnosis Date    Anemia     Arthritis     Cataract     Chronic kidney disease, stage 3 unspecified (Multi) 09/26/2018    Stage 3 chronic kidney disease    CKD (chronic kidney disease)     stage V    Cough 02/12/2024    COVID-19 06/18/2020    COVID-19 virus infection    Diabetes (Multi)     ESRD (end stage renal disease) (Multi)     Focal and segmental proliferative glomerulonephritis 12/23/2023    HTN (hypertension)     Hyperlipidemia     Other long term (current) drug therapy 07/20/2021    High risk medication use    Personal history of other diseases of the circulatory system     Personal history of cardiac murmur    Personal history of other infectious and parasitic diseases 08/17/2015    History of  hepatitis    Polyp, colonic 08/17/2023    Primary osteoarthritis of both ankles 08/17/2023    Prostate cancer (Multi)     Tubular adenoma of colon 08/17/2023    Unspecified kidney failure 08/17/2016    Renal failure     PSH:  Past Surgical History:   Procedure Laterality Date    ILEOSTOMY  04/25/2017    Ileostomy    ILEOSTOMY CLOSURE  08/17/2015    Ileostomy Closure    OTHER SURGICAL HISTORY  04/21/2017    Right Hemicolectomy    OTHER SURGICAL HISTORY  08/17/2015    Arteriovenous Surgery Creation Of A-V Fistula    OTHER SURGICAL HISTORY  08/17/2015    Sigmoidoscopy (Fiberoptic, Therapeutic )    PROSTATECTOMY  10/11/2013    Prostatectomy Radical    TRANSPLANT, KIDNEY, OPEN  1992    TRANSPLANT, KIDNEY, OPEN  2013    US GUIDED PERCUTANEOUS BIOPSY RENAL LEFT Left 11/20/2023    US GUIDED PERCUTANEOUS BIOPSY RENAL LEFT 11/20/2023 Lou Rodgers MD [facility]    US GUIDED PERCUTANEOUS PERITONEAL OR RETROPERITONEAL FLUID COLLECTION DRAINAGE  10/20/2022    US GUIDED PERCUTANEOUS PERITONEAL OR RETROPERITONEAL FLUID COLLECTION DRAINAGE 10/20/2022 [facility] CLINICAL LEGACY     Soc Hx:  Social History     Socioeconomic History    Marital status: Single     Spouse name: Not on file    Number of children: Not on file    Years of education: Not on file    Highest education level: Not on file   Occupational History    Not on file   Tobacco Use    Smoking status: Never     Passive exposure: Past    Smokeless tobacco: Never   Vaping Use    Vaping status: Never Used   Substance and Sexual Activity    Alcohol use: Never    Drug use: Not on file    Sexual activity: Defer   Other Topics Concern    Not on file   Social History Narrative    Not on file     Social Drivers of Health     Financial Resource Strain: Low Risk  (1/29/2025)    Overall Financial Resource Strain (CARDIA)     Difficulty of Paying Living Expenses: Not hard at all   Food Insecurity: No Food Insecurity (1/27/2025)    Hunger Vital Sign     Worried About Running Out of Food in the  Last Year: Never true     Ran Out of Food in the Last Year: Never true   Transportation Needs: No Transportation Needs (1/29/2025)    PRAPARE - Transportation     Lack of Transportation (Medical): No     Lack of Transportation (Non-Medical): No   Physical Activity: Sufficiently Active (1/5/2025)    Exercise Vital Sign     Days of Exercise per Week: 7 days     Minutes of Exercise per Session: 60 min   Stress: No Stress Concern Present (1/5/2025)    [place] [place] [employer] - Occupational Stress Questionnaire     Feeling of Stress : Not at all   Social Connections: Socially Isolated (1/5/2025)    Social Connection and Isolation Panel [NHANES]     Frequency of Communication with Friends and Family: More than three times a week     Frequency of Social Gatherings with Friends and Family: More than three times a week     Attends Scientologist Services: Never     Active Member of Clubs or Organizations: No     Attends Club or Organization Meetings: Never     Marital Status: Never    Intimate Partner Violence: Not At Risk (1/27/2025)    Humiliation, Afraid, Rape, and Kick questionnaire     Fear of Current or Ex-Partner: No     Emotionally Abused: No     Physically Abused: No     Sexually Abused: No   Housing Stability: Low Risk  (1/29/2025)    Housing Stability Vital Sign     Unable to Pay for Housing in the Last Year: No     Number of Times Moved in the Last Year: 0     Homeless in the Last Year: No     Fam Hx:  Family History   Problem Relation Name Age of Onset    Bone cancer Mother      Other (corona's sarcome of the bone marrow) Mother      Prostate cancer Father      Diabetes Other Family Hist     Hypertension Other Family Hist       Allergies:  No Known Allergies  Current Medications:  No current facility-administered medications on file prior to encounter.     Current Outpatient Medications on File Prior to Encounter   Medication Sig Dispense Refill    acetaminophen (Tylenol) 325 mg tablet Take 3  tablets (975 mg) by mouth every 6 hours if needed (pain). 30 tablet 11    calcitriol (Rocaltrol) 0.5 mcg capsule Take 1 capsule (0.5 mcg) by mouth once daily. 90 capsule 3    carvedilol (Coreg) 12.5 mg tablet Take 3 tablets (37.5 mg) by mouth 2 times a day. PATIENT NEEDS TO FOLLOW UP WITH CARDIOLOGY (Patient taking differently: Take 2 tablets (25 mg) by mouth 2 times a day. PATIENT NEEDS TO FOLLOW UP WITH CARDIOLOGY) 180 tablet 1    cinacalcet (Sensipar) 30 mg tablet Take 1 tablet (30 mg) by mouth once daily. 30 tablet 1    Easy Touch Alcohol Prep Pads pads, medicated       epoetin aida 10,000 unit/mL injection Inject 1 mL (10,000 Units) under the skin every 7 days. Do not start before April 17, 2024. (Patient not taking: Reported on 1/6/2025) 4 mL 11    glucagon (Gvoke HypoPen 1-Pack) 1 mg/0.2 mL auto-injector Inject 1 mg into the muscle every 15 minutes if needed (For blood glucose 41 to 70 mg/dL and no IV access). 0.2 mL 12    imiquimod (Aldara) 5 % cream Apply 1 packet topically 3 times a week. 12 packet 3    insulin glargine (Lantus) 100 unit/mL (3 mL) pen Inject 20 Units under the skin once daily in the morning. Inject 20 units daily as directed 15 mL 0    insulin lispro (HumaLOG KwikPen Insulin) 100 unit/mL injection Inject 3 Units under the skin 3 times daily (morning, midday, late afternoon). 2 units with meals plus sliding scale up to 30 units daily 15 mL 0    isosorbide mononitrate ER (Imdur) 60 mg 24 hr tablet Take 1 tablet (60 mg) by mouth once daily. 90 tablet 3    ketorolac (Acular) 0.5 % ophthalmic solution Instill 1 drop into right eye three times a day FOR USE AFTER CATARACT SURGERY 5 mL 1    lancets (Unilet Super Thin Lancets) 30 gauge misc USE TO TEST BLOOD SUGAR THREE TIMES A  each 0    lancing device misc use as directed 1 each 0    neomycin-polymyxin-dexAMETHasone (Maxitrol) 3.5mg/mL-10,000 unit/mL-0.1 % ophthalmic suspension Instill 1 drop into right eye three times a day FOR USE AFTER  "CATARACT SURGERY 5 mL 1    NIFEdipine ER (Adalat CC) 90 mg 24 hr tablet Take 1 tablet (90 mg) by mouth 2 times a day. Do not crush, chew, or split. 60 tablet 0    pantoprazole (ProtoNix) 40 mg EC tablet Take 1 tablet (40 mg) by mouth once daily in the morning. Take before meals. Do not crush, chew, or split. Do not start before January 22, 2024. 30 tablet 11    pen needle, diabetic (TechLITE Pen Needle) 32 gauge x 5/32\" needle Use 4 a day as directed 400 each 3    pravastatin (Pravachol) 10 mg tablet Take 1 tablet (10 mg) by mouth once daily at bedtime. 90 tablet 1    predniSONE (Deltasone) 5 mg tablet Take 8 tablets (40 mg) by mouth once daily for 5 days, THEN 4 tablets (20 mg) once daily for 5 days, THEN 2 tablets (10 mg) once daily for 5 days, THEN 1 tablet (5 mg) once daily. Do not start before January 9, 2025. 130 tablet 0    sulfamethoxazole-trimethoprim (Bactrim DS) 800-160 mg tablet Take 1 tablet by mouth once daily. 30 tablet 0    syringe with needle 3 mL 25 x 5/8\" syringe Use to inject epogen. 7 each 0    tacrolimus (Prograf) 0.5 mg capsule Take 1 capsule (0.5 mg) by mouth 2 times a day. 60 capsule 11    tacrolimus (Protopic) 0.1 % ointment Apply topically 2 times a day. 60 g 3    Unilet Super Thin Lancets 30 gauge misc 1 each 3 times a day. 100 each 3    vitamin B complex-vitamin C-folic acid (Nephrocaps) 1 mg capsule Take 1 capsule by mouth once daily. 30 capsule 11         Objective   Vitals:  Visit Vitals  BP (!) 195/98   Pulse 89   Temp 37.4 °C (99.3 °F)   Resp 25       Physical Exam:  GEN: No acute distress. Alert, awake and conversant.  HEENT: Sclera anicteric. Moist mucous membranes.  RESP: Breathing non-labored, equal chest rise. On RA.  CV: Regular rate, hypertensive to 195/98  GI: Abdomen soft, mildly distended, tender to palpation in the left lower quadrant.   :  Essentially anuric at baseline  MSK: No gross deformities. Moves all extremities spontaneously.  NEURO: Alert and oriented x3. No " focal deficits.  PSYCH: Appropriate mood and affect.  SKIN: No rashes or lesions.    Labs within past 24h:  Results for orders placed or performed during the hospital encounter of 01/26/25 (from the past 24 hours)   Magnesium   Result Value Ref Range    Magnesium 1.89 1.60 - 2.40 mg/dL   Renal Function Panel   Result Value Ref Range    Glucose 171 (H) 74 - 99 mg/dL    Sodium 134 (L) 136 - 145 mmol/L    Potassium 4.3 3.5 - 5.3 mmol/L    Chloride 95 (L) 98 - 107 mmol/L    Bicarbonate 28 21 - 32 mmol/L    Anion Gap 15 10 - 20 mmol/L    Urea Nitrogen 37 (H) 6 - 23 mg/dL    Creatinine 10.91 (H) 0.50 - 1.30 mg/dL    eGFR 5 (L) >60 mL/min/1.73m*2    Calcium 8.7 8.6 - 10.6 mg/dL    Phosphorus 4.5 2.5 - 4.9 mg/dL    Albumin 3.2 (L) 3.4 - 5.0 g/dL   CBC and Auto Differential   Result Value Ref Range    WBC 3.4 (L) 4.4 - 11.3 x10*3/uL    nRBC 0.0 0.0 - 0.0 /100 WBCs    RBC 3.15 (L) 4.50 - 5.90 x10*6/uL    Hemoglobin 9.1 (L) 13.5 - 17.5 g/dL    Hematocrit 28.0 (L) 41.0 - 52.0 %    MCV 89 80 - 100 fL    MCH 28.9 26.0 - 34.0 pg    MCHC 32.5 32.0 - 36.0 g/dL    RDW 16.5 (H) 11.5 - 14.5 %    Platelets 80 (L) 150 - 450 x10*3/uL    Neutrophils % 68.5 40.0 - 80.0 %    Immature Granulocytes %, Automated 0.6 0.0 - 0.9 %    Lymphocytes % 15.0 13.0 - 44.0 %    Monocytes % 12.4 2.0 - 10.0 %    Eosinophils % 3.2 0.0 - 6.0 %    Basophils % 0.3 0.0 - 2.0 %    Neutrophils Absolute 2.33 1.20 - 7.70 x10*3/uL    Immature Granulocytes Absolute, Automated 0.02 0.00 - 0.70 x10*3/uL    Lymphocytes Absolute 0.51 (L) 1.20 - 4.80 x10*3/uL    Monocytes Absolute 0.42 0.10 - 1.00 x10*3/uL    Eosinophils Absolute 0.11 0.00 - 0.70 x10*3/uL    Basophils Absolute 0.01 0.00 - 0.10 x10*3/uL   Tacrolimus level   Result Value Ref Range    Tacrolimus  <2.0 <=15.0 ng/mL   POCT GLUCOSE   Result Value Ref Range    POCT Glucose 158 (H) 74 - 99 mg/dL   POCT GLUCOSE   Result Value Ref Range    POCT Glucose 60 (L) 74 - 99 mg/dL   Procalcitonin   Result Value Ref Range     Procalcitonin 1.47 (H) <=0.07 ng/mL   POCT GLUCOSE   Result Value Ref Range    POCT Glucose 70 (L) 74 - 99 mg/dL   POCT GLUCOSE   Result Value Ref Range    POCT Glucose 256 (H) 74 - 99 mg/dL   POCT GLUCOSE   Result Value Ref Range    POCT Glucose 253 (H) 74 - 99 mg/dL     *Note: Due to a large number of results and/or encounters for the requested time period, some results have not been displayed. A complete set of results can be found in Results Review.       Imaging within past 24h:  XR chest 2 views    Result Date: 1/29/2025  Interpreted By:  Yohana Delgadillo, STUDY: XR CHEST 2 VIEWS;  1/29/2025 10:06 am   INDICATION: Signs/Symptoms:Pulmonary edema/pneumonia.     COMPARISON: 01/26/2025   ACCESSION NUMBER(S): [id number]   ORDERING CLINICIAN: JEFFREY CORTEZ   FINDINGS: AP radiograph of the chest was provided.   Vascular stent projected in the region of the left subclavian/axillary vasculature   CARDIOMEDIASTINAL SILHOUETTE: Cardiomediastinal silhouette is stable in size and configuration.   LUNGS: Almost complete interval resolution patient's pulmonary edema. No pneumothorax or pleural effusion.   ABDOMEN: No remarkable upper abdominal findings.   BONES: No acute osseous changes.       1.  Almost complete interval resolution pulmonary edema, minimal on today's exam       MACRO: None   Signed by: Yohana Delgadillo 1/29/2025 2:56 PM Dictation workstation:   [id number]    Transthoracic Echo (TTE) Limited    Result Date: 1/28/2025   [facility], [address] [place] [address]                Tel [phone] and Fax [phone] TRANSTHORACIC ECHOCARDIOGRAM REPORT  Patient Name:       KELVIN Flannery Physician:    [id number] Prashant Coleman MD Study Date:         1/28/2025           Ordering Provider:    [id number] GRZEGORZ GIANG MRN/PID:            [id number]             Fellow: Accession#:         [id number]        Nurse: Date of Birth/Age:  1955 / 69      Sonographer:          Marlon Rojo                     years                                     RDCS, RVT Gender assigned at  M                   Additional Staff: Birth: Height:             172.72 cm           Admit Date:           1/26/2025 Weight:             72.58 kg            Admission Status:     Inpatient -                                                               Routine BSA / BMI:          1.86 m2 / 24.33     Encounter#:           [id number]                     kg/m2 Blood Pressure:     174/80 mmHg         Department Location:  [place] [address] Study Type:    TRANSTHORACIC ECHO (TTE) LIMITED Diagnosis/ICD: Chronic diastolic (congestive) heart failure (CHF)-I50.32 Indication:    h/o heart disease, admitted for volume overload and elevated BNP CPT Code:      Echo Limited-13646; Doppler Limited-34664; Color Doppler-72379 Patient History: Pertinent History: HFpEF, HTN, HLD, DM-2. Study Detail: The following Echo studies were performed: 2D, M-Mode, Doppler and               color flow.  PHYSICIAN INTERPRETATION: Left Ventricle: The left ventricular systolic function is normal, with a visually estimated ejection fraction of 55%. There is mild eccentric left ventricular hypertrophy. There are no regional left ventricular wall motion abnormalities. The left ventricular cavity size is moderately dilated. There is normal septal and normal posterior left ventricular wall thickness. Spectral Doppler shows a Grade III (restrictive pattern) of left ventricular diastolic filling with an elevated left atrial pressure. Left Atrium: The left atrial size is moderately dilated. Right Ventricle: The right ventricle is normal in size. There is normal right ventricular global systolic function. Right Atrium: The right atrium is upper limits of normal in size. Aortic Valve: The aortic valve is trileaflet. The aortic valve  dimensionless index is 0.65. There is trace aortic valve regurgitation. The peak instantaneous gradient of the aortic valve is 18 mmHg. The mean gradient of the aortic valve is 8 mmHg. Mitral Valve: The mitral valve is mildly thickened. There is mild mitral annular calcification. The peak instantaneous gradient of the mitral valve is 7 mmHg. There is mild mitral valve regurgitation. The mitral regurgitant orifice area is 4 mm2. The mitral regurgitant volume is 7.88 ml. Tricuspid Valve: The tricuspid valve is structurally normal. There is trace tricuspid regurgitation. The right ventricular systolic pressure is unable to be estimated. Pulmonic Valve: The pulmonic valve is structurally normal. There is mild pulmonic valve regurgitation. Pericardium: Trivial pericardial effusion. Aorta: The aortic root is normal. The aortic root appears normal in size and measures 3.30 cm. The Asc Ao is 3.90 cm. There is mild dilatation of the ascending aorta. Systemic Veins: The inferior vena cava appears normal in size, with IVC inspiratory collapse greater than 50%. In comparison to the previous echocardiogram(s): Compared with study dated 4/5/2024, No significant change on side by side comparison.  CONCLUSIONS:  1. The left ventricular systolic function is normal, with a visually estimated ejection fraction of 55%.  2. Spectral Doppler shows a Grade III (restrictive pattern) of left ventricular diastolic filling with an elevated left atrial pressure.  3. Left ventricular cavity size is moderately dilated.  4. There is normal right ventricular global systolic function.  5. The left atrial size is moderately dilated.  6. Normal aortic root.  7. Compared with study dated 4/5/2024, No significant change on side by side comparison. QUANTITATIVE DATA SUMMARY:  2D MEASUREMENTS:          Normal Ranges: Ao Root d:       3.30 cm  (2.0-3.7cm) LAs:             4.00 cm  (2.7-4.0cm) IVSd:            0.90 cm  (0.6-1.1cm) LVPWd:           0.90 cm   (0.6-1.1cm) LVIDd:           6.10 cm  (3.9-5.9cm) LVIDs:           4.70 cm LV Mass Index:   119 g/m2 LVEDV Index:     94 ml/m2 LV % FS          23.0 %  LEFT ATRIUM:                  Normal Ranges: LA Vol A4C:        89.2 ml    (22+/-6mL/m2) LA Vol A2C:        71.7 ml LA Vol BP:         81.8 ml LA Vol Index A4C:  48.0ml/m2 LA Vol Index A2C:  38.6 ml/m2 LA Vol Index BP:   44.0 ml/m2 LA Area A4C:       25.3 cm2 LA Area A2C:       23.2 cm2 LA Major Axis A4C: 6.1 cm LA Major Axis A2C: 6.4 cm  RIGHT ATRIUM:          Normal Ranges: RA Area A4C:  18.7 cm2  AORTA MEASUREMENTS:         Normal Ranges: Asc Ao, d:          3.90 cm (2.1-3.4cm)  LV SYSTOLIC FUNCTION:                      Normal Ranges: EF-Visual:      55 % LV EF Reported: 55 %  LV DIASTOLIC FUNCTION:            Normal Ranges: MV Peak E:             1.46 m/s   (0.7-1.2 m/s) MV Peak A:             0.59 m/s   (0.42-0.7 m/s) E/A Ratio:             2.46       (1.0-2.2) MV e'                  0.081 m/s  (>8.0) MV lateral e'          0.09 m/s MV medial e'           0.07 m/s E/e' Ratio:            18.02      (<8.0) PulmV Sys Bharat:         53.00 cm/s PulmV Yoder Bharat:        77.90 cm/s PulmV S/D Bharat:         0.70  MITRAL VALVE:          Normal Ranges: MV Vmax:      1.29 m/s (<=1.3m/s) MV peak P.7 mmHg (<5mmHg) MV mean PG:   3.0 mmHg (<2mmHg) MV Annulus:   3.40 cm  (1.8-3.1cm)  MITRAL INSUFFICIENCY:             Normal Ranges: PISA Radius:          0.4 cm MR VTI:               189.00 cm MR Vmax:              591.00 cm/s MR Alias Bharat:         24.5 cm/s MR Volume:            7.88 ml MR Flow Rt:           24.63 ml/s MR EROA:              4 mm2 MR RF                 3.00 %  AORTIC VALVE:                      Normal Ranges: AoV Vmax:                2.14 m/s  (<=1.7m/s) AoV Peak P.3 mmHg (<20mmHg) AoV Mean P.0 mmHg  (1.7-11.5mmHg) LVOT Max Bharat:            1.23 m/s  (<=1.1m/s) AoV VTI:                 37.70 cm  (18-25cm) LVOT VTI:                 24.40 cm LVOT Diameter:           2.10 cm   (1.8-2.4cm) AoV Area, VTI:           2.24 cm2  (2.5-5.5cm2) AoV Area,Vmax:           1.99 cm2  (2.5-4.5cm2) AoV Dimensionless Index: 0.65  RIGHT VENTRICLE: RV Basal 3.90 cm RV Mid   2.70 cm RV Major 7.8 cm TAPSE:   27.5 mm RV s'    0.11 m/s  TRICUSPID VALVE/RVSP:         Normal Ranges: IVC Diam:             1.70 cm  PULMONIC VALVE:          Normal Ranges: PV Accel Time:  151 msec (>120ms) PV Max Bharat:     1.0 m/s  (0.6-0.9m/s) PV Max PG:      3.9 mmHg PV Mean P.0 mmHg PV VTI:         20.80 cm  PULMONARY VEINS: PulmV Yoder Bharat: 77.90 cm/s PulmV S/D Bharat:  0.70 PulmV Sys Bharat:  53.00 cm/s  [id number] Prashant Coleman MD Electronically signed on 2025 at 3:27:23 PM  ** Final **      I have reviewed the imaging above as it pertains to the patient's surgical concerns and agree with the radiologist's interpretation.     ASSESSMENT  Manolo Bashir is a 69 y.o. male with past medical history of ESRD (s/p two kidney transplants: initially in  c/b allograft dysfunction, and again in , c/b impaired allograft function, presently on IHD since May 2024; on tacro, pred), MGUS, type 2 diabetes, hypertension, prostate cancer, and HCV who is admitted for management of hypoxic respiratory failure 2/2 pulmonary edema vs. Pneumonia. Transplant Surgery was evaluated for nephrectomy of transplanted kidney in setting of impaired allograft function and persistent pain. CT demonstrating edematous appearance of transplanted kidney with adjacent mesenteric fat stranding. In light of allograft dysfunction c/b persistent pain at transplant site, nephrectomy is a reasonable option.     PLAN:  - Patient will be evaluated by Dr. South and transplant surgery team today     Discussed with Dr. South.    Hiren Alicia MD  PGY-1 General Surgery  Transplant Surgery [id number]

## 2025-01-30 NOTE — CARE PLAN
Problem: Diabetes  Goal: Achieve decreasing blood glucose levels by end of shift  Outcome: Progressing  Goal: Increase stability of blood glucose readings by end of shift  Outcome: Progressing  Goal: Maintain electrolyte levels within acceptable range throughout shift  Outcome: Progressing  Goal: Maintain glucose levels >70mg/dl to <250mg/dl throughout shift  Outcome: Progressing     Problem: Pain - Adult  Goal: Verbalizes/displays adequate comfort level or baseline comfort level  Outcome: Progressing     Problem: Safety - Adult  Goal: Free from fall injury  Outcome: Progressing     Problem: Discharge Planning  Goal: Discharge to home or other facility with appropriate resources  Outcome: Progressing     Problem: Chronic Conditions and Co-morbidities  Goal: Patient's chronic conditions and co-morbidity symptoms are monitored and maintained or improved  Outcome: Progressing     Problem: Nutrition  Goal: Nutrient intake appropriate for maintaining nutritional needs  Outcome: Progressing   The patient's goals for the shift include      The clinical goals for the shift include pt will remain safe through shift

## 2025-01-30 NOTE — NURSING NOTE
"At 0340, I went into patient's room and his breathing is labored; HOB 45 degrees; RR 32, 100% RA; 190/80; 72, 36.8; He stated several times that he doesn't feel good; he looks worried, whereas he was asleep about an hour ago; States that he coughed so hard, he vomitted. States 6 bowel movements (C-diff) and that the vanco \"isn't helping\"    MD Betsy Degroot notified and came to bedside; meanwhile, PRN medications were administered for cough and pain    Pt on call for CT abdomen  "

## 2025-01-30 NOTE — PROGRESS NOTES
MEDICINE INPATIENT PROGRESS NOTE    Manolo Bashir is a 69 y.o. male on hospital day 4 who presented with Shortness of breath    Subjective   Overnight team was called to the bedside around 03:00 this AM. Per report, the nurse found the patient SOB after having an episode of post-tussive emesis. He reported difficulty breathing and increased pain over his LLQ where his transplanted kidney was. Additionally, he was hypertensive with SBPs in the 190s. He received one dose of dilaudid to treat potential pain-related hypertension and the transplant nephrology team was contacted. They requested a CT abdomen/pelvis which showed an edematous transplanted kidney and could not rule out infection. As such, the transplant surgery team was consulted for potential nephrectomy.    Patient seen this morning in dialysis. He reports feeling tired and continues to have a cough. Denies current fever, chills, chest pain, SOB, n/v. No additional questions or concerns at this time.           Objective     Last Recorded Vitals  Vitals:    01/30/25 0647 01/30/25 0748 01/30/25 1043 01/30/25 1204   BP:  (!) 206/104  136/64   Pulse: 90 90 78 79   Resp:       Temp: 37.4 °C (99.3 °F)  36 °C (96.8 °F)    TempSrc: Temporal  Temporal    SpO2:       Weight:       Height:           Intake/Output  I/O last 2 completed shifts:  In: 400 (5.5 mL/kg) [I.V.:400 (5.5 mL/kg)]  Out: - (0 mL/kg)   Weight: 72.6 kg     Physical Exam  Constitutional: Patient does not appear to be in any acute distress, Aox4  Cardio: RRR, S1/S2, no MRG  Pulm: CTAB no wheezes, normal respiratory effort  GI: +BS, soft, tender over LLQ (transplanted kidney) nondistended, no guarding or rebound, no masses noted  MSK: No joint swelling, multiple aneurysms and AV fistula noted on LUE, currently accessed for HD  Skin: No lesions, contusions, or erythema.  Extremities: no BLE swelling   Neuro: No focal deficits  Psych: Appropriate mood and behavior    Labs    CBC  Results from last 7  "days   Lab Units 01/30/25  0634 01/29/25  0629 01/28/25  0550 01/27/25  0653 01/26/25  0622   HEMOGLOBIN g/dL 8.8* 9.1* 8.8* 8.9* 10.4*   HEMATOCRIT % 27.2* 28.0* 28.8* 27.1* 32.1*   WBC AUTO x10*3/uL 4.1* 3.4* 3.0* 3.6* 4.9   PLATELETS AUTO x10*3/uL 82* 80* 59* 55* 57*      BMP  Results from last 7 days   Lab Units 01/30/25  0634 01/29/25  0629 01/28/25  0550 01/27/25  0653   SODIUM mmol/L 132* 134* 136 136   POTASSIUM mmol/L 5.1 4.3 4.2 5.2   CHLORIDE mmol/L 95* 95* 98 98   CO2 mmol/L 26 28 29 29   BUN mg/dL 43* 37* 27* 44*   CREATININE mg/dL 14.10* 10.91* 8.41* 11.07*   MAGNESIUM mg/dL 1.76 1.89 1.95 1.94   PHOSPHORUS mg/dL 4.0 4.5 4.4 6.7*   ANION GAP mmol/L 16 15 13 14   GLUCOSE mg/dL 148* 171* 109* 77   CALCIUM mg/dL 8.9 8.7 8.5* 8.4*     LFTS  Results from last 7 days   Lab Units 01/26/25  0622   ALT U/L 11   AST U/L 32   ALK PHOS U/L 70   BILIRUBIN TOTAL mg/dL 0.6     COAGS        No lab exists for component: \"PT\"     Micro  No results found for the last 90 days.     C diff toxin negative, PCR positive    Imaging  CT abdomen pelvis wo IV contrast    Result Date: 1/30/2025  1.  Edematous appearance of the left iliac fossa transplant kidney with surrounding mesenteric fat stranding/edema. Underlying infectious process involving the transplant kidney is not definitively excluded. Additional diagnostic consideration would include sequela of diverticulitis or colitis of the adjacent sigmoid/descending colon which assessment is limited for on noncontrast examination. 2. Increased ground-glass opacities involving the bilateral lung bases. In the setting of increasing now bilateral pleural effusions and interstitial septal thickening these findings are favored to represent sequela of developing pulmonary edema however atypical infectious process should be considered. 3. Additional incidental findings as detailed above, including mild splenomegaly.   I personally reviewed the images/study and I agree with the " findings as stated by resident Jean Rowland. This study was interpreted at [place], [place].   MACRO: None     Dictation workstation:   [id number]    XR chest 2 views    Result Date: 1/29/2025  1.  Almost complete interval resolution pulmonary edema, minimal on today's exam       MACRO: None   Signed by: Yohana Delgadillo 1/29/2025 2:56 PM Dictation workstation:   [id number]    XR chest 2 views    Result Date: 1/26/2025  1.  Mild central pulmonary vascular congestion with patchy perihilar/bibasilar airspace opacities. Recommend correlation with patient's volume status as findings could reflect interstitial edema. Superimposed infectious process is not definitively excluded. 2. Small bilateral pleural effusions.   I personally reviewed the images/study and I agree with the findings as stated by resident Jean Rowland. This study was interpreted at [place], [place].   MACRO: None   Signed by: James Prasad 1/26/2025 9:31 AM Dictation workstation:   [id number]    CT abdomen pelvis wo IV contrast    Result Date: 1/5/2025  1. Interval development of marked fat stranding adjacent to the left iliac fossa renal transplant which appears edematous and heterogeneous, new compared to 09/10/2024 CT. No perinephric fluid collections. Findings concerning for urinary tract infection/pyelonephritis. Correlation with urinalysis is recommended. 2. Slight interval decrease in mild right pleural effusion with interval resolution of previously noted small left pleural effusion. Persistent bilateral ground-glass opacities with mild interlobular septal thickening, likely pulmonary edema. 3. Mild splenomegaly measuring up to 13.5 cm in craniocaudal dimension, which has improved compared to 14.5 cm on 09/10/2024 CT. 4. Additional findings as described above.   I personally reviewed the images/study and I agree with the findings as  stated by Kendrick Purdy MD. This study was interpreted at [facility], [place], [place].   MACRO: None.   Signed by: Hue Aguilar 1/5/2025 2:17 PM Dictation workstation:   [id number]      Medications   Scheduled Medications  brimonidine, 1 drop, Right Eye, BID  bumetanide, 4 mg, intravenous, Once  carvedilol, 37.5 mg, oral, BID  cinacalcet, 30 mg, oral, Daily  epoetin aida or biosimilar, 5,000 Units, intravenous, Once per day on Tuesday Thursday Saturday  heparin (porcine), 5,000 Units, subcutaneous, q8h ZOË  imiquimod, 1 packet, Topical, Once per day on Monday Wednesday Friday  insulin glargine, 15 Units, subcutaneous, q AM  insulin lispro, 0-10 Units, subcutaneous, TID AC  isosorbide mononitrate ER, 60 mg, oral, Daily  ketorolac, 1 drop, Right Eye, TID  [START ON 1/31/2025] levoFLOXacin, 250 mg, oral, q48h  neomycin-polymyxin-dexAMETHasone, 1 drop, Right Eye, q8h ZOË  NIFEdipine ER, 90 mg, oral, Daily before breakfast  pantoprazole, 40 mg, oral, Daily before breakfast  paricalcitol, 10 mcg, intravenous, Once per day on Tuesday Thursday Saturday  pravastatin, 10 mg, oral, Nightly  predniSONE, 5 mg, oral, Daily  sevelamer carbonate, 800 mg, oral, TID AC  tacrolimus, 0.5 mg, oral, BID  vancomycin, 125 mg, oral, q6h  vitamin B complex-vitamin C-folic acid, 1 capsule, oral, Daily     Continuous Medications    PRN Medications  PRN medications: albuterol, benzonatate, dextrose, dextrose, glucagon, glucagon, HYDROmorphone, oxygen, polyethylene glycol          Assessment/Plan   Manolo Bashir is a 69 y.o. male with a PMH of ESRD (on dialysis, TThSa schedule, most recent dialysis 1/25), c/b impaired allograft function currently on immunosuppression (prednisone, tacrolimus), IgG and IgA lambda MGUS (BM biopsy 10/23 without evidence of myeloma), T2DM (on 20 units Lantus in AM, Lispro 3 units TID + SSI), HTN, prostate cancer (s/p radical prostatectomy 10/2013), HCV (s/p treatment, PCR negative  10/2023), and gastroparesis who presents for 1 day history of SOB, nausea/vomiting, diarrhea, and general malaise that started during dialysis on 1/25. Presentation is concerning for fluid overload 2/2 ESRD, however cannot rule out cardiac etiologies of pulmonary edema. Also, given the patient's fever, history of immunocompromise, and elevated procalcitonin, there is concern for potential underlying pneumonia so he is being treated for nosocomial pneumonia. Additionally, C. Diff PCR was positive (although toxin negative) but will continue to treat given significant risk factors.    Updates 1/30/25  - Overnight developed severe pain in LLQ and abdominal distention, severe cough c/b post-tussive emesis and SOB. 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  #Chronic immunosuppression  #Elevated BNP  - Patient febrile on admission to 38.4  - Hx of ESRD 2/2 HTN s/p 2 allographic kidney transplants c/b chronic rejection, dialysis TThSa, has not missed dialysis sessions  - Most recent TTE 4/4/24, EF 54%, dilated/hypertrophic left ventricle, dilated LA/RA, mild to moderate aortic regurgitation  - QTc on admission 450 ms  -Procal elevated to 0.95  - Troponin trend 65 -> 87 -> 86  -MRSA nares negative 1/26  -TTE 1/28 without significant change from prior  PLAN  - Transplant nephrology consulted, appreciate recommendations                - Dialysis today 1/30, return to normal schedule TThSa  - Continue prednisone 5  mg daily  - Continue tacrolimus 0.5 mg BID  - Transplant surgery consulted for potential nephrectomy, appreciate recommendations  - Per nephrology:                - Continue DARIANA                - Continue calcitriol, on parenteral zemplar OP                - Continue sevelamer 800 mg TID  - CTM daily RFP, CBC  - Strict I/Os  - Daily standing weights as tolerated    #AHRF 2/2 pulmonary edema vs. Pneumonia, improved  - Patient hypoxic to 88% on RA on admission, required 3L supplemental O2 briefly in ED, was weaned off  - CXR on admission with evidence of pulmonary vascular congestion, pulmonary edema and pleural effusions, cannot rule out potential infiltrate  - COVID/Flu/RSV negative, other viral URI studies negative so far  Plan  - Continue Levofloxacin 500 mg today then 250 mg every other day  - Tessalon Purls and albuterol nebs PRN for cough  - Transplant ID consulted, appreciate recommendations  - Supplemental O2 PRN, goal SpO2 > 92%  - Changed pain control from tylenol to oxy in order to further assess for potential fevers     #C. diff infection, recurrent  #Gastroparesis  #Nausea and vomiting, improved  #Diarrhea, improving  - C. Diff PCR +, toxin negative, however given risk factors of immunocompromise, recent abx, and frequent hospital/health care setting visits, will treat  - Hx of C. Diff in 2024, treated with PO vancomycin  - nausea/vomiting may be partially 2/2 chronic transplant rejection  PLAN  - Continue PO vancomycin 125 mg q6h x 10 days (through 2/6)  - Transplant ID consulted, appreciate recommendations   - CMV PCR ordered  - Stool O/P ordered, pending  - Giardia, cryptosporidium antigen pending  - Consider restarting metoclopramide if nausea/vomiting recur  - CTM stool output and abdominal exams  - Orthostatic vitals ordered     #IgG and IgA lambda MGUS  #Thrombocytopenia  - Thrombocytopenia chronic, however baseline ~90s, may be decreased in the setting of viral infection vs. Immunosuppression vs.  MGUS  - 4T score 3, did receive heparin on prior admission 1/5-1/8 but low suspicion for HIT  PLAN  - CTM on daily CBC  - Continue home cinacalcet 30 mg daily     #Hx of recent cataract surgery  - Continue home eye drops:                - Maxitrol 1 drop R eye q8h                - brimonidine 2% solution 1 drop R eye BID                - ketorolac 0.5% solution 1 drop R eye q8h     #HTN  - Continue home nifedipine 90 mg daily  - Continue home carvedilol 25 mg BID  - Continue home Imdur 60 mg daily  - May add PRN hydralazine 10 mg PO for SBP >180     #T2DM  - Most recent A1c 9.1 12/16/24  PLAN  - Continue Lantus 15 units in AM + #2 SSI     #HLD  - Continue home pravastatin 10 mg daily     #GERD  - Continue home pantoprazole 40 mg daily    F: Replete PRN  E: Replete PRN  N: Adult diet Phosphorus restricted 1 gm   A: PIV    Oxygen: None  Abx: levoFLOXacin - 500 mg  neomycin-polymyxin-dexAMETHasone - 3.5mg/mL-10,000 unit/mL-0.1 %, 3.5mg/mL-10,000 unit/mL-0.1 %  sulfamethoxazole-trimethoprim - 800-160 mg  vancomycin - 50 mg/mL     DVT Ppx: Heparin SubQ  GI Ppx: Pantoprazole  GI Laxative: Miralax     Code Status: full (confirmed on admission)  Surrogate Medical Decision-maker:  Amalia Enriquez ([phone])          Renay Cheung, MS4  Please Haiku with any questions or concerns.

## 2025-01-30 NOTE — PROCEDURES
"DIALYSIS NOTE:  Events and plans noted    Seen during hemodialysis, undergoing treatment per submitted orders: 3 K, 2.5 Ca, 3.75  hours. Fluid removal 2.4 liters(gain), as tolerated (keep SBP> 90mmHg).     BP (!) 206/104   Pulse 90   Temp 37.4 °C (99.3 °F) (Temporal)   Resp 25   Ht 1.727 m (5' 8\")   Wt 72.6 kg (160 lb)   SpO2 96%   BMI 24.33 kg/m²       Additional Recommendations:  Low sodium, low K diet please    Scheduled medications  brimonidine, 1 drop, Right Eye, BID  bumetanide, 4 mg, intravenous, Once  carvedilol, 37.5 mg, oral, BID  cinacalcet, 30 mg, oral, Daily  epoetin aida or biosimilar, 5,000 Units, intravenous, Once per day on Tuesday Thursday Saturday  heparin (porcine), 5,000 Units, subcutaneous, q8h ZOË  imiquimod, 1 packet, Topical, Once per day on Monday Wednesday Friday  insulin glargine, 15 Units, subcutaneous, q AM  insulin lispro, 0-10 Units, subcutaneous, TID AC  isosorbide mononitrate ER, 60 mg, oral, Daily  ketorolac, 1 drop, Right Eye, TID  [START ON 1/31/2025] levoFLOXacin, 250 mg, oral, q48h  neomycin-polymyxin-dexAMETHasone, 1 drop, Right Eye, q8h ZOË  NIFEdipine ER, 90 mg, oral, Daily before breakfast  pantoprazole, 40 mg, oral, Daily before breakfast  paricalcitol, 10 mcg, intravenous, Once per day on Tuesday Thursday Saturday  pravastatin, 10 mg, oral, Nightly  predniSONE, 5 mg, oral, Daily  sevelamer carbonate, 800 mg, oral, TID AC  tacrolimus, 0.5 mg, oral, BID  vancomycin, 125 mg, oral, q6h  vitamin B complex-vitamin C-folic acid, 1 capsule, oral, Daily      Continuous medications     PRN medications  PRN medications: albuterol, benzonatate, dextrose, dextrose, glucagon, glucagon, HYDROmorphone, oxygen, polyethylene glycol    Recent Results (from the past 24 hours)   POCT GLUCOSE    Collection Time: 01/29/25 11:48 AM   Result Value Ref Range    POCT Glucose 60 (L) 74 - 99 mg/dL   Procalcitonin    Collection Time: 01/29/25 12:01 PM   Result Value Ref Range    Procalcitonin " 1.47 (H) <=0.07 ng/mL   POCT GLUCOSE    Collection Time: 01/29/25 12:28 PM   Result Value Ref Range    POCT Glucose 70 (L) 74 - 99 mg/dL   POCT GLUCOSE    Collection Time: 01/29/25  5:33 PM   Result Value Ref Range    POCT Glucose 256 (H) 74 - 99 mg/dL   POCT GLUCOSE    Collection Time: 01/29/25  9:40 PM   Result Value Ref Range    POCT Glucose 253 (H) 74 - 99 mg/dL   Magnesium    Collection Time: 01/30/25  6:34 AM   Result Value Ref Range    Magnesium 1.76 1.60 - 2.40 mg/dL   Renal Function Panel    Collection Time: 01/30/25  6:34 AM   Result Value Ref Range    Glucose 148 (H) 74 - 99 mg/dL    Sodium 132 (L) 136 - 145 mmol/L    Potassium 5.1 3.5 - 5.3 mmol/L    Chloride 95 (L) 98 - 107 mmol/L    Bicarbonate 26 21 - 32 mmol/L    Anion Gap 16 10 - 20 mmol/L    Urea Nitrogen 43 (H) 6 - 23 mg/dL    Creatinine 14.10 (H) 0.50 - 1.30 mg/dL    eGFR 3 (L) >60 mL/min/1.73m*2    Calcium 8.9 8.6 - 10.6 mg/dL    Phosphorus 4.0 2.5 - 4.9 mg/dL    Albumin 3.1 (L) 3.4 - 5.0 g/dL   CBC and Auto Differential    Collection Time: 01/30/25  6:34 AM   Result Value Ref Range    WBC 4.1 (L) 4.4 - 11.3 x10*3/uL    nRBC 0.0 0.0 - 0.0 /100 WBCs    RBC 3.07 (L) 4.50 - 5.90 x10*6/uL    Hemoglobin 8.8 (L) 13.5 - 17.5 g/dL    Hematocrit 27.2 (L) 41.0 - 52.0 %    MCV 89 80 - 100 fL    MCH 28.7 26.0 - 34.0 pg    MCHC 32.4 32.0 - 36.0 g/dL    RDW 16.7 (H) 11.5 - 14.5 %    Platelets 82 (L) 150 - 450 x10*3/uL    Neutrophils % 70.4 40.0 - 80.0 %    Immature Granulocytes %, Automated 0.2 0.0 - 0.9 %    Lymphocytes % 16.2 13.0 - 44.0 %    Monocytes % 10.0 2.0 - 10.0 %    Eosinophils % 2.7 0.0 - 6.0 %    Basophils % 0.5 0.0 - 2.0 %    Neutrophils Absolute 2.87 1.20 - 7.70 x10*3/uL    Immature Granulocytes Absolute, Automated 0.01 0.00 - 0.70 x10*3/uL    Lymphocytes Absolute 0.66 (L) 1.20 - 4.80 x10*3/uL    Monocytes Absolute 0.41 0.10 - 1.00 x10*3/uL    Eosinophils Absolute 0.11 0.00 - 0.70 x10*3/uL    Basophils Absolute 0.02 0.00 - 0.10 x10*3/uL

## 2025-01-30 NOTE — NURSING NOTE
Report from Sending RN:    Report From: Judith  Recent Surgery of Procedure: No  Baseline Level of Consciousness (LOC): a and o x 4  Oxygen Use: No  Type: ra  Diabetic: Yes  Last BP Med Given Day of Dialysis: None today  Last Pain Med Given: 0.4 Dilaudid  Lab Tests to be Obtained with Dialysis: Yes  Blood Transfusion to be Given During Dialysis: No  Available IV Access: Yes  Medications to be Administered During Dialysis: No  Continuous IV Infusion Running: No  Restraints on Currently or in the Last 24 Hours: No  Hand-Off Communication: CO headache and cough during HS, tachypnic. Feels L kidney pain.  Had CT of abd.  Has pneumonia and c-diff  Dialysis Catheter Dressing: AVG  Last Dressing Change: NA

## 2025-01-30 NOTE — CARE PLAN
The patient's goals for the shift include      The clinical goals for the shift include pt will remain safe and HDS this shift      Problem: Diabetes  Goal: Maintain glucose levels >70mg/dl to <250mg/dl throughout shift  Outcome: Progressing     Problem: Pain - Adult  Goal: Verbalizes/displays adequate comfort level or baseline comfort level  Outcome: Progressing     Problem: Safety - Adult  Goal: Free from fall injury  Outcome: Progressing     Problem: Discharge Planning  Goal: Discharge to home or other facility with appropriate resources  Outcome: Progressing     Problem: Chronic Conditions and Co-morbidities  Goal: Patient's chronic conditions and co-morbidity symptoms are monitored and maintained or improved  Outcome: Progressing     Problem: Nutrition  Goal: Nutrient intake appropriate for maintaining nutritional needs  Outcome: Progressing

## 2025-01-30 NOTE — CONSULTS
Inpatient consult to Infectious Diseases  Consult performed by: Harvey Hill MD  Consult ordered by: Daniel Peña MD          Primary MD: Elma Hutton, APRN-CNP, DNP    Reason For Consult  fever    History Of Present Illness  Manolo Bashir is a 69 y.o. male man with PMH ESRD s/p failed kidney transplant (failed May 2024) then on HD via LUE AVG, DM, HTN, who was admitted with fever, cough and diarrhea.  Of note he did have an admission earlier this month with graft intolerance for which he was treated with pulse steroids followed by taper and tacrolimus.   Pt had workup and was negative for flu/covid/RSV, rhino/metapneum/adenovirus, stool panel negative. Blood cultures were also negative. Cdiff PCR was positive/toxin neg.    Pt remained afebrile until today. He has had HTN, but otherwise stable and on room air. He says his cough is still there, dry, maybe a little better. Still has diarrhea. He has pain at his graft site.     Past Medical History  He has a past medical history of Anemia, Arthritis, Cataract, Chronic kidney disease, stage 3 unspecified (Multi) (09/26/2018), CKD (chronic kidney disease), Cough (02/12/2024), COVID-19 (06/18/2020), Diabetes (Multi), ESRD (end stage renal disease) (Multi), Focal and segmental proliferative glomerulonephritis (12/23/2023), HTN (hypertension), Hyperlipidemia, Other long term (current) drug therapy (07/20/2021), Personal history of other diseases of the circulatory system, Personal history of other infectious and parasitic diseases (08/17/2015), Polyp, colonic (08/17/2023), Primary osteoarthritis of both ankles (08/17/2023), Prostate cancer (Multi), Tubular adenoma of colon (08/17/2023), and Unspecified kidney failure (08/17/2016).    Surgical History  He has a past surgical history that includes Prostatectomy (10/11/2013); Ileostomy (04/25/2017); Other surgical history (04/21/2017); Ileostomy closure (08/17/2015); Other surgical history (08/17/2015); Other  surgical history (08/17/2015); US guided percutaneous peritoneal or retroperitoneal fluid collection drainage (10/20/2022); transplant, kidney, open (1992); transplant, kidney, open (2013); and US guided percutaneous biopsy renal left (Left, 11/20/2023).     Social History     Occupational History    Not on file   Tobacco Use    Smoking status: Never     Passive exposure: Past    Smokeless tobacco: Never   Vaping Use    Vaping status: Never Used   Substance and Sexual Activity    Alcohol use: Never    Drug use: Not on file    Sexual activity: Defer     Travel History   Travel since 12/30/24    No documented travel since 12/30/24              Family History  Family History   Problem Relation Name Age of Onset    Bone cancer Mother      Other (corona's sarcome of the bone marrow) Mother      Prostate cancer Father      Diabetes Other Family Hist     Hypertension Other Family Hist      Allergies  Patient has no known allergies.     Immunization History   Administered Date(s) Administered    Hep A / Hep B 08/17/2015    Hep A, Unspecified 08/17/2015    Hepatitis A vaccine, age 19 years and greater (HAVRIX) 08/17/2015    Influenza, Unspecified 09/22/2009    Influenza, seasonal, injectable 09/22/2009    Pneumococcal Conjugate PCV 7 1956     Medications  Home medications:  Medications Prior to Admission   Medication Sig Dispense Refill Last Dose/Taking    acetaminophen (Tylenol) 325 mg tablet Take 3 tablets (975 mg) by mouth every 6 hours if needed (pain). 30 tablet 11     calcitriol (Rocaltrol) 0.5 mcg capsule Take 1 capsule (0.5 mcg) by mouth once daily. 90 capsule 3     carvedilol (Coreg) 12.5 mg tablet Take 3 tablets (37.5 mg) by mouth 2 times a day. PATIENT NEEDS TO FOLLOW UP WITH CARDIOLOGY (Patient taking differently: Take 2 tablets (25 mg) by mouth 2 times a day. PATIENT NEEDS TO FOLLOW UP WITH CARDIOLOGY) 180 tablet 1     cinacalcet (Sensipar) 30 mg tablet Take 1 tablet (30 mg) by mouth once daily. 30 tablet 1  "    Easy Touch Alcohol Prep Pads pads, medicated        epoetin aida 10,000 unit/mL injection Inject 1 mL (10,000 Units) under the skin every 7 days. Do not start before April 17, 2024. (Patient not taking: Reported on 1/6/2025) 4 mL 11     glucagon (Gvoke HypoPen 1-Pack) 1 mg/0.2 mL auto-injector Inject 1 mg into the muscle every 15 minutes if needed (For blood glucose 41 to 70 mg/dL and no IV access). 0.2 mL 12     imiquimod (Aldara) 5 % cream Apply 1 packet topically 3 times a week. 12 packet 3     insulin glargine (Lantus) 100 unit/mL (3 mL) pen Inject 20 Units under the skin once daily in the morning. Inject 20 units daily as directed 15 mL 0     insulin lispro (HumaLOG KwikPen Insulin) 100 unit/mL injection Inject 3 Units under the skin 3 times daily (morning, midday, late afternoon). 2 units with meals plus sliding scale up to 30 units daily 15 mL 0     isosorbide mononitrate ER (Imdur) 60 mg 24 hr tablet Take 1 tablet (60 mg) by mouth once daily. 90 tablet 3     ketorolac (Acular) 0.5 % ophthalmic solution Instill 1 drop into right eye three times a day FOR USE AFTER CATARACT SURGERY 5 mL 1     lancets (Unilet Super Thin Lancets) 30 gauge misc USE TO TEST BLOOD SUGAR THREE TIMES A  each 0     lancing device misc use as directed 1 each 0     neomycin-polymyxin-dexAMETHasone (Maxitrol) 3.5mg/mL-10,000 unit/mL-0.1 % ophthalmic suspension Instill 1 drop into right eye three times a day FOR USE AFTER CATARACT SURGERY 5 mL 1     NIFEdipine ER (Adalat CC) 90 mg 24 hr tablet Take 1 tablet (90 mg) by mouth 2 times a day. Do not crush, chew, or split. 60 tablet 0     pantoprazole (ProtoNix) 40 mg EC tablet Take 1 tablet (40 mg) by mouth once daily in the morning. Take before meals. Do not crush, chew, or split. Do not start before January 22, 2024. 30 tablet 11     pen needle, diabetic (TechLITE Pen Needle) 32 gauge x 5/32\" needle Use 4 a day as directed 400 each 3     pravastatin (Pravachol) 10 mg tablet Take " "1 tablet (10 mg) by mouth once daily at bedtime. 90 tablet 1     predniSONE (Deltasone) 5 mg tablet Take 8 tablets (40 mg) by mouth once daily for 5 days, THEN 4 tablets (20 mg) once daily for 5 days, THEN 2 tablets (10 mg) once daily for 5 days, THEN 1 tablet (5 mg) once daily. Do not start before January 9, 2025. 130 tablet 0     sulfamethoxazole-trimethoprim (Bactrim DS) 800-160 mg tablet Take 1 tablet by mouth once daily. 30 tablet 0     syringe with needle 3 mL 25 x 5/8\" syringe Use to inject epogen. 7 each 0     tacrolimus (Prograf) 0.5 mg capsule Take 1 capsule (0.5 mg) by mouth 2 times a day. 60 capsule 11     tacrolimus (Protopic) 0.1 % ointment Apply topically 2 times a day. 60 g 3     Unilet Super Thin Lancets 30 gauge misc 1 each 3 times a day. 100 each 3     vitamin B complex-vitamin C-folic acid (Nephrocaps) 1 mg capsule Take 1 capsule by mouth once daily. 30 capsule 11      Current medications:  Scheduled medications  brimonidine, 1 drop, Right Eye, BID  bumetanide, 4 mg, intravenous, Once  carvedilol, 37.5 mg, oral, BID  cinacalcet, 30 mg, oral, Daily  epoetin aida or biosimilar, 5,000 Units, intravenous, Once per day on Tuesday Thursday Saturday  heparin (porcine), 5,000 Units, subcutaneous, q8h [facility]  imiquimod, 1 packet, Topical, Once per day on Monday Wednesday Friday  insulin glargine, 15 Units, subcutaneous, q AM  insulin lispro, 0-10 Units, subcutaneous, TID AC  isosorbide mononitrate ER, 60 mg, oral, Daily  ketorolac, 1 drop, Right Eye, TID  neomycin-polymyxin-dexAMETHasone, 1 drop, Right Eye, q8h ZOË  NIFEdipine ER, 90 mg, oral, Daily before breakfast  pantoprazole, 40 mg, oral, Daily before breakfast  paricalcitol, 10 mcg, intravenous, Once per day on Tuesday Thursday Saturday  piperacillin-tazobactam, 2.25 g, intravenous, q8h  pravastatin, 10 mg, oral, Nightly  predniSONE, 40 mg, oral, Daily   Followed by  [START ON 2/1/2025] predniSONE, 20 mg, oral, Daily   Followed by  [START ON 2/3/2025] " predniSONE, 10 mg, oral, Daily   Followed by  [START ON 2/5/2025] predniSONE, 5 mg, oral, Daily  sevelamer carbonate, 800 mg, oral, TID AC  tacrolimus, 0.5 mg, oral, BID  vancomycin, 125 mg, oral, q6h  vitamin B complex-vitamin C-folic acid, 1 capsule, oral, Daily      Continuous medications     PRN medications  PRN medications: albuterol, benzonatate, dextrose, dextrose, glucagon, glucagon, HYDROmorphone, oxygen, polyethylene glycol    Review of Systems   Constitutional:  Positive for fatigue and fever.   HENT: Negative.     Eyes: Negative.    Respiratory:  Positive for cough.    Cardiovascular: Negative.    Gastrointestinal:  Positive for abdominal pain.   Genitourinary: Negative.    Musculoskeletal: Negative.         Objective  Range of Vitals (last 24 hours)  Heart Rate:  [73-90]   Temp:  [36 °C (96.8 °F)-38.4 °C (101.1 °F)]   Resp:  [16-31]   BP: (121-206)/()   SpO2:  [95 %-100 %]   Daily Weight  01/26/25 : 72.6 kg (160 lb)    Body mass index is 24.33 kg/m².     Gen: sitting up, NAD  OP; MMM, no lesions  Neck: supple, no RYAN  Pulm: CTA b/l  CV: RRR, 2/6 SALVADOR RUSB  Abd; soft, milt tenderness RLQ  Ext: warm, no edema  L arm AVG unremarkable, surrounded by old fistula     Relevant Results    Labs  Results from last 72 hours   Lab Units 01/30/25  0634 01/29/25  0629 01/28/25  0550   WBC AUTO x10*3/uL 4.1* 3.4* 3.0*   HEMOGLOBIN g/dL 8.8* 9.1* 8.8*   HEMATOCRIT % 27.2* 28.0* 28.8*   PLATELETS AUTO x10*3/uL 82* 80* 59*   NEUTROS PCT AUTO % 70.4 68.5 64.7   LYMPHS PCT AUTO % 16.2 15.0 18.3   MONOS PCT AUTO % 10.0 12.4 13.0   EOS PCT AUTO % 2.7 3.2 3.0     Results from last 72 hours   Lab Units 01/30/25  0634 01/29/25  0629 01/28/25  0550   SODIUM mmol/L 132* 134* 136   POTASSIUM mmol/L 5.1 4.3 4.2   CHLORIDE mmol/L 95* 95* 98   CO2 mmol/L 26 28 29   BUN mg/dL 43* 37* 27*   CREATININE mg/dL 14.10* 10.91* 8.41*   GLUCOSE mg/dL 148* 171* 109*   CALCIUM mg/dL 8.9 8.7 8.5*   ANION GAP mmol/L 16 15 13   EGFR  mL/min/1.73m*2 3* 5* 6*   PHOSPHORUS mg/dL 4.0 4.5 4.4     Results from last 72 hours   Lab Units 01/30/25  0634 01/29/25  0629 01/28/25  0550   ALBUMIN g/dL 3.1* 3.2* 3.1*     Estimated Creatinine Clearance: 4.9 mL/min (A) (by C-G formula based on SCr of 14.1 mg/dL (H)).  C-Reactive Protein   Date Value Ref Range Status   02/01/2024 0.57 <1.00 mg/dL Final     CRP   Date Value Ref Range Status   10/20/2022 3.17 (A) mg/dL Final     Comment:     REF VALUE  < 1.00     10/15/2022 25.57 (A) mg/dL Final     Comment:     REF VALUE  < 1.00       Sedimentation Rate   Date Value Ref Range Status   10/20/2022 28 (H) 0 - 20 mm/h Final   10/15/2022 60 (H) 0 - 20 mm/h Final   03/04/2022 1 0 - 20 mm/h Final     HIV 1/2 Antigen/Antibody Screen with Reflex to Confirmation   Date Value Ref Range Status   08/24/2024 Nonreactive Nonreactive Final     Hepatitis C AB   Date Value Ref Range Status   07/10/2024 Reactive (A) Nonreactive Final     Comment:     HCV antibody detected. HCV RNA PCR has been ordered to evaluate the possibility of a current infection.     Results from patients taking biotin supplements or receiving high-dose biotin therapy should be interpreted with caution due to possible interference with this test. Providers may contact their local laboratory for further information.     HCV PCR Quant   Date Value Ref Range Status   03/04/2022 NOT DETECTED IU/mL Final     Comment:     REF VALUE  NOT DETECTED         Imaging  CT a/p  IMPRESSION:  1.  Edematous appearance of the left iliac fossa transplant kidney  with surrounding mesenteric fat, not significantly changed compared  to 01/05/2025 CT. Additionally, urinary bladder is decompressed,  limiting evaluation, however there is unchanged marked perivesicular  fat stranding scratch. Findings concerning for urinary tract  infection/pyelonephritis for correlation with urinalysis.  2. Interval decrease in ground-glass opacities involving the  bilateral lung bases. In the  setting of cardiomegaly, increasing  bilateral pleural effusions and interstitial septal thickening these  findings are favored to represent pulmonary edema, however  superimposed infection can not be excluded.  3. Additional stable chronic as detailed above, including mild  splenomegaly.      CXR  IMPRESSION:  1.  Almost complete interval resolution pulmonary edema, minimal on  today's exam        Assessment/Plan   69 y.o man with PMH ESRD s/p failed kidney transplant with ongoing rejected, on HD via AVG admitted with cough and diarrhea.  CXR appears clear now and not having worsening resp status so I think pneumonia is unlikely. Not much colitis on ct imaging. I don't have a good explanation for his fever and cough, suspect viral.  - can stop zosyn and azithro  - continue oral vancomycin for cdiff  - will see if his diarrhea improves, if not he may need further evaluation  - thank you, will follow, I am going off service and the new ID team will take over    I spent 35 minutes in the professional and overall care of this patient.      Harvey Hill MD

## 2025-01-30 NOTE — PROGRESS NOTES
Transitional Care Coordination Progress Note:  Patient discussed during interdisciplinary rounds.   Team members present: MD, ABIDA  Plan per Medical/Surgical team: Shortness of breath  Payor: Medicare  Discharge disposition: PT pending  Potential Barriers: medical  ADOD: 2 days  Per MD, transplant ID and transplant surgery consulted. Will continue to monitor for discharge planning needs.     Myah ESPINOSAN, RN  Transitional Care Coordinator (TCC)  [phone]

## 2025-01-31 ENCOUNTER — APPOINTMENT (OUTPATIENT)
Dept: CARDIOLOGY | Facility: HOSPITAL | Age: 70
DRG: 640 | End: 2025-01-31
Payer: MEDICARE

## 2025-01-31 LAB
ALBUMIN SERPL BCP-MCNC: 3.2 G/DL (ref 3.4–5)
ALBUMIN SERPL BCP-MCNC: 3.3 G/DL (ref 3.4–5)
ANION GAP SERPL CALC-SCNC: 14 MMOL/L (ref 10–20)
ANION GAP SERPL CALC-SCNC: 18 MMOL/L (ref 10–20)
BASOPHILS # BLD AUTO: 0 X10*3/UL (ref 0–0.1)
BASOPHILS NFR BLD AUTO: 0 %
BUN SERPL-MCNC: 27 MG/DL (ref 6–23)
BUN SERPL-MCNC: 30 MG/DL (ref 6–23)
CALCIUM SERPL-MCNC: 9 MG/DL (ref 8.6–10.6)
CALCIUM SERPL-MCNC: 9.6 MG/DL (ref 8.6–10.6)
CHLORIDE SERPL-SCNC: 93 MMOL/L (ref 98–107)
CHLORIDE SERPL-SCNC: 95 MMOL/L (ref 98–107)
CMV DNA SERPL NAA+PROBE-LOG IU: ABNORMAL {LOG_IU}/ML
CO2 SERPL-SCNC: 25 MMOL/L (ref 21–32)
CO2 SERPL-SCNC: 31 MMOL/L (ref 21–32)
CREAT SERPL-MCNC: 8.91 MG/DL (ref 0.5–1.3)
CREAT SERPL-MCNC: 9.71 MG/DL (ref 0.5–1.3)
CRYPTOSP AG STL QL IA: NEGATIVE
EGFRCR SERPLBLD CKD-EPI 2021: 5 ML/MIN/1.73M*2
EGFRCR SERPLBLD CKD-EPI 2021: 6 ML/MIN/1.73M*2
EOSINOPHIL # BLD AUTO: 0 X10*3/UL (ref 0–0.7)
EOSINOPHIL NFR BLD AUTO: 0 %
ERYTHROCYTE [DISTWIDTH] IN BLOOD BY AUTOMATED COUNT: 16.3 % (ref 11.5–14.5)
G LAMBLIA AG STL QL IA: NEGATIVE
GLUCOSE BLD MANUAL STRIP-MCNC: 217 MG/DL (ref 74–99)
GLUCOSE BLD MANUAL STRIP-MCNC: 257 MG/DL (ref 74–99)
GLUCOSE BLD MANUAL STRIP-MCNC: 300 MG/DL (ref 74–99)
GLUCOSE BLD MANUAL STRIP-MCNC: 374 MG/DL (ref 74–99)
GLUCOSE SERPL-MCNC: 239 MG/DL (ref 74–99)
GLUCOSE SERPL-MCNC: 375 MG/DL (ref 74–99)
HCT VFR BLD AUTO: 28.7 % (ref 41–52)
HGB BLD-MCNC: 9 G/DL (ref 13.5–17.5)
IMM GRANULOCYTES # BLD AUTO: 0.02 X10*3/UL (ref 0–0.7)
IMM GRANULOCYTES NFR BLD AUTO: 0.7 % (ref 0–0.9)
LABORATORY COMMENT REPORT: ABNORMAL
LYMPHOCYTES # BLD AUTO: 0.27 X10*3/UL (ref 1.2–4.8)
LYMPHOCYTES NFR BLD AUTO: 10 %
MAGNESIUM SERPL-MCNC: 1.93 MG/DL (ref 1.6–2.4)
MAGNESIUM SERPL-MCNC: 2.02 MG/DL (ref 1.6–2.4)
MCH RBC QN AUTO: 28.8 PG (ref 26–34)
MCHC RBC AUTO-ENTMCNC: 31.4 G/DL (ref 32–36)
MCV RBC AUTO: 92 FL (ref 80–100)
MONOCYTES # BLD AUTO: 0.13 X10*3/UL (ref 0.1–1)
MONOCYTES NFR BLD AUTO: 4.8 %
NEUTROPHILS # BLD AUTO: 2.27 X10*3/UL (ref 1.2–7.7)
NEUTROPHILS NFR BLD AUTO: 84.5 %
NRBC BLD-RTO: 0 /100 WBCS (ref 0–0)
PHOSPHATE SERPL-MCNC: 3.5 MG/DL (ref 2.5–4.9)
PHOSPHATE SERPL-MCNC: 3.9 MG/DL (ref 2.5–4.9)
PLATELET # BLD AUTO: 85 X10*3/UL (ref 150–450)
POTASSIUM SERPL-SCNC: 4.8 MMOL/L (ref 3.5–5.3)
POTASSIUM SERPL-SCNC: 5.8 MMOL/L (ref 3.5–5.3)
RBC # BLD AUTO: 3.13 X10*6/UL (ref 4.5–5.9)
SODIUM SERPL-SCNC: 132 MMOL/L (ref 136–145)
SODIUM SERPL-SCNC: 133 MMOL/L (ref 136–145)
TACROLIMUS BLD-MCNC: <2 NG/ML
WBC # BLD AUTO: 2.7 X10*3/UL (ref 4.4–11.3)

## 2025-01-31 PROCEDURE — 2500000001 HC RX 250 WO HCPCS SELF ADMINISTERED DRUGS (ALT 637 FOR MEDICARE OP)

## 2025-01-31 PROCEDURE — 99232 SBSQ HOSP IP/OBS MODERATE 35: CPT | Performed by: NURSE PRACTITIONER

## 2025-01-31 PROCEDURE — 2500000004 HC RX 250 GENERAL PHARMACY W/ HCPCS (ALT 636 FOR OP/ED)

## 2025-01-31 PROCEDURE — 85025 COMPLETE CBC W/AUTO DIFF WBC: CPT

## 2025-01-31 PROCEDURE — 99233 SBSQ HOSP IP/OBS HIGH 50: CPT | Performed by: INTERNAL MEDICINE

## 2025-01-31 PROCEDURE — 36415 COLL VENOUS BLD VENIPUNCTURE: CPT

## 2025-01-31 PROCEDURE — 80069 RENAL FUNCTION PANEL: CPT

## 2025-01-31 PROCEDURE — 83735 ASSAY OF MAGNESIUM: CPT

## 2025-01-31 PROCEDURE — 2500000002 HC RX 250 W HCPCS SELF ADMINISTERED DRUGS (ALT 637 FOR MEDICARE OP, ALT 636 FOR OP/ED)

## 2025-01-31 PROCEDURE — 93010 ELECTROCARDIOGRAM REPORT: CPT | Performed by: STUDENT IN AN ORGANIZED HEALTH CARE EDUCATION/TRAINING PROGRAM

## 2025-01-31 PROCEDURE — 2500000005 HC RX 250 GENERAL PHARMACY W/O HCPCS

## 2025-01-31 PROCEDURE — 93005 ELECTROCARDIOGRAM TRACING: CPT

## 2025-01-31 PROCEDURE — 80197 ASSAY OF TACROLIMUS: CPT

## 2025-01-31 PROCEDURE — 82947 ASSAY GLUCOSE BLOOD QUANT: CPT

## 2025-01-31 PROCEDURE — 2500000004 HC RX 250 GENERAL PHARMACY W/ HCPCS (ALT 636 FOR OP/ED): Performed by: INTERNAL MEDICINE

## 2025-01-31 PROCEDURE — 99232 SBSQ HOSP IP/OBS MODERATE 35: CPT | Performed by: STUDENT IN AN ORGANIZED HEALTH CARE EDUCATION/TRAINING PROGRAM

## 2025-01-31 PROCEDURE — 1100000001 HC PRIVATE ROOM DAILY

## 2025-01-31 RX ORDER — CALCIUM GLUCONATE 20 MG/ML
2 INJECTION, SOLUTION INTRAVENOUS ONCE
Status: DISCONTINUED | OUTPATIENT
Start: 2025-01-31 | End: 2025-01-31

## 2025-01-31 RX ORDER — NIFEDIPINE 90 MG/1
90 TABLET, EXTENDED RELEASE ORAL 2 TIMES DAILY
Status: DISCONTINUED | OUTPATIENT
Start: 2025-01-31 | End: 2025-02-05 | Stop reason: HOSPADM

## 2025-01-31 RX ORDER — HYDRALAZINE HYDROCHLORIDE 10 MG/1
10 TABLET, FILM COATED ORAL ONCE
Status: COMPLETED | OUTPATIENT
Start: 2025-01-31 | End: 2025-01-31

## 2025-01-31 RX ADMIN — VANCOMYCIN HYDROCHLORIDE 125 MG: KIT at 06:22

## 2025-01-31 RX ADMIN — NEOMYCIN SULFATE, POLYMYXIN B SULFATE AND DEXAMETHASONE 1 DROP: 3.5; 10000; 1 SUSPENSION OPHTHALMIC at 20:50

## 2025-01-31 RX ADMIN — HEPARIN SODIUM 5000 UNITS: 5000 INJECTION, SOLUTION INTRAVENOUS; SUBCUTANEOUS at 13:08

## 2025-01-31 RX ADMIN — HEPARIN SODIUM 5000 UNITS: 5000 INJECTION, SOLUTION INTRAVENOUS; SUBCUTANEOUS at 06:22

## 2025-01-31 RX ADMIN — NEOMYCIN SULFATE, POLYMYXIN B SULFATE AND DEXAMETHASONE 1 DROP: 3.5; 10000; 1 SUSPENSION OPHTHALMIC at 13:15

## 2025-01-31 RX ADMIN — BENZONATATE 100 MG: 100 CAPSULE ORAL at 23:41

## 2025-01-31 RX ADMIN — VANCOMYCIN HYDROCHLORIDE 125 MG: KIT at 23:37

## 2025-01-31 RX ADMIN — HYDRALAZINE HYDROCHLORIDE 10 MG: 10 TABLET ORAL at 07:51

## 2025-01-31 RX ADMIN — SODIUM ZIRCONIUM CYCLOSILICATE 10 G: 10 POWDER, FOR SUSPENSION ORAL at 18:51

## 2025-01-31 RX ADMIN — NIFEDIPINE 90 MG: 90 TABLET, FILM COATED, EXTENDED RELEASE ORAL at 20:47

## 2025-01-31 RX ADMIN — IMIQUIMOD 1 PACKET: 12.5 CREAM TOPICAL at 13:07

## 2025-01-31 RX ADMIN — NEOMYCIN SULFATE, POLYMYXIN B SULFATE AND DEXAMETHASONE 1 DROP: 3.5; 10000; 1 SUSPENSION OPHTHALMIC at 04:03

## 2025-01-31 RX ADMIN — VANCOMYCIN HYDROCHLORIDE 125 MG: KIT at 17:51

## 2025-01-31 RX ADMIN — TACROLIMUS 0.5 MG: 0.5 CAPSULE ORAL at 20:44

## 2025-01-31 RX ADMIN — ALBUTEROL SULFATE 2 PUFF: 90 AEROSOL, METERED RESPIRATORY (INHALATION) at 00:43

## 2025-01-31 RX ADMIN — VANCOMYCIN HYDROCHLORIDE 125 MG: KIT at 13:11

## 2025-01-31 RX ADMIN — INSULIN LISPRO 4 UNITS: 100 INJECTION, SOLUTION INTRAVENOUS; SUBCUTANEOUS at 17:51

## 2025-01-31 RX ADMIN — RENO CAPS 1 CAPSULE: 100; 1.5; 1.7; 20; 10; 1; 150; 5; 6 CAPSULE ORAL at 09:48

## 2025-01-31 RX ADMIN — CARVEDILOL 37.5 MG: 12.5 TABLET, FILM COATED ORAL at 20:43

## 2025-01-31 RX ADMIN — PIPERACILLIN SODIUM AND TAZOBACTAM SODIUM 2.25 G: 2; .25 INJECTION, SOLUTION INTRAVENOUS at 00:18

## 2025-01-31 RX ADMIN — BRIMONIDINE TARTRATE 1 DROP: 2 SOLUTION/ DROPS OPHTHALMIC at 09:57

## 2025-01-31 RX ADMIN — PANTOPRAZOLE SODIUM 40 MG: 40 TABLET, DELAYED RELEASE ORAL at 06:22

## 2025-01-31 RX ADMIN — PREDNISONE 40 MG: 20 TABLET ORAL at 13:08

## 2025-01-31 RX ADMIN — TACROLIMUS 0.5 MG: 0.5 CAPSULE ORAL at 09:48

## 2025-01-31 RX ADMIN — INSULIN GLARGINE 15 UNITS: 100 INJECTION, SOLUTION SUBCUTANEOUS at 09:54

## 2025-01-31 RX ADMIN — INSULIN HUMAN 10 UNITS: 100 INJECTION, SUSPENSION SUBCUTANEOUS at 13:16

## 2025-01-31 RX ADMIN — KETOROLAC TROMETHAMINE 1 DROP: 5 SOLUTION OPHTHALMIC at 20:50

## 2025-01-31 RX ADMIN — INSULIN LISPRO 10 UNITS: 100 INJECTION, SOLUTION INTRAVENOUS; SUBCUTANEOUS at 09:32

## 2025-01-31 RX ADMIN — PRAVASTATIN SODIUM 10 MG: 10 TABLET ORAL at 20:44

## 2025-01-31 RX ADMIN — SEVELAMER CARBONATE 800 MG: 800 TABLET, FILM COATED ORAL at 13:07

## 2025-01-31 RX ADMIN — HEPARIN SODIUM 5000 UNITS: 5000 INJECTION, SOLUTION INTRAVENOUS; SUBCUTANEOUS at 20:44

## 2025-01-31 RX ADMIN — CARVEDILOL 37.5 MG: 12.5 TABLET, FILM COATED ORAL at 09:47

## 2025-01-31 RX ADMIN — BRIMONIDINE TARTRATE 1 DROP: 2 SOLUTION/ DROPS OPHTHALMIC at 20:50

## 2025-01-31 RX ADMIN — KETOROLAC TROMETHAMINE 1 DROP: 5 SOLUTION OPHTHALMIC at 04:04

## 2025-01-31 RX ADMIN — NIFEDIPINE 90 MG: 90 TABLET, FILM COATED, EXTENDED RELEASE ORAL at 06:22

## 2025-01-31 RX ADMIN — KETOROLAC TROMETHAMINE 1 DROP: 5 SOLUTION OPHTHALMIC at 13:15

## 2025-01-31 RX ADMIN — SODIUM ZIRCONIUM CYCLOSILICATE 10 G: 10 POWDER, FOR SUSPENSION ORAL at 11:23

## 2025-01-31 RX ADMIN — ISOSORBIDE MONONITRATE 60 MG: 30 TABLET, EXTENDED RELEASE ORAL at 09:47

## 2025-01-31 RX ADMIN — INSULIN LISPRO 6 UNITS: 100 INJECTION, SOLUTION INTRAVENOUS; SUBCUTANEOUS at 13:16

## 2025-01-31 RX ADMIN — SEVELAMER CARBONATE 800 MG: 800 TABLET, FILM COATED ORAL at 17:51

## 2025-01-31 RX ADMIN — CINACALCET HYDROCHLORIDE 30 MG: 30 TABLET, FILM COATED ORAL at 09:48

## 2025-01-31 RX ADMIN — SEVELAMER CARBONATE 800 MG: 800 TABLET, FILM COATED ORAL at 09:47

## 2025-01-31 ASSESSMENT — COGNITIVE AND FUNCTIONAL STATUS - GENERAL
DAILY ACTIVITIY SCORE: 22
MOBILITY SCORE: 23
HELP NEEDED FOR BATHING: A LITTLE
DRESSING REGULAR LOWER BODY CLOTHING: A LITTLE
CLIMB 3 TO 5 STEPS WITH RAILING: A LITTLE

## 2025-01-31 ASSESSMENT — PAIN SCALES - GENERAL
PAINLEVEL_OUTOF10: 0 - NO PAIN
PAINLEVEL_OUTOF10: 0 - NO PAIN

## 2025-01-31 NOTE — CARE PLAN
The patient's goals for the shift include      The clinical goals for the shift include pt will remain safe overnight      Problem: Diabetes  Goal: Maintain glucose levels >70mg/dl to <250mg/dl throughout shift  Outcome: Progressing     Problem: Pain - Adult  Goal: Verbalizes/displays adequate comfort level or baseline comfort level  Outcome: Progressing     Problem: Safety - Adult  Goal: Free from fall injury  Outcome: Progressing     Problem: Discharge Planning  Goal: Discharge to home or other facility with appropriate resources  Outcome: Progressing     Problem: Chronic Conditions and Co-morbidities  Goal: Patient's chronic conditions and co-morbidity symptoms are monitored and maintained or improved  Outcome: Progressing     Problem: Nutrition  Goal: Nutrient intake appropriate for maintaining nutritional needs  Outcome: Progressing

## 2025-01-31 NOTE — CARE PLAN
Problem: Diabetes  Goal: Achieve decreasing blood glucose levels by end of shift  Outcome: Progressing  Goal: Increase stability of blood glucose readings by end of shift  Outcome: Progressing  Goal: Maintain glucose levels >70mg/dl to <250mg/dl throughout shift  Outcome: Progressing     Problem: Pain - Adult  Goal: Verbalizes/displays adequate comfort level or baseline comfort level  Outcome: Progressing     Problem: Safety - Adult  Goal: Free from fall injury  Outcome: Progressing     Problem: Discharge Planning  Goal: Discharge to home or other facility with appropriate resources  Outcome: Progressing     Problem: Chronic Conditions and Co-morbidities  Goal: Patient's chronic conditions and co-morbidity symptoms are monitored and maintained or improved  Outcome: Progressing     Problem: Nutrition  Goal: Nutrient intake appropriate for maintaining nutritional needs  Outcome: Progressing   The patient's goals for the shift include      The clinical goals for the shift include pts bp will be wdl through shift

## 2025-01-31 NOTE — PROGRESS NOTES
"Manolo Bashir is a 69 y.o. male on day 5 of admission presenting with Shortness of breath.    Subjective   Pt sitting up in chair. Denies sob, , fever, cough, chills, chest pains, light head, dizziness, constipation, n/v has improved, cont with diarrhea, LLQ quad pain/tenderness improved       Objective     Physical Exam  Vitals and nursing note reviewed.   Cardiovascular:      Rate and Rhythm: Normal rate and regular rhythm.   Pulmonary:      Comments: Jeffrey lung sounds dim  POX 97% room air  Abdominal:      General: There is distension.      Tenderness: There is abdominal tenderness.      Comments: tender to palpation. LLQ   Genitourinary:     Comments: Heavy bleeding with urination.. Transplant surgery consulted for potential nephrectomy  Musculoskeletal:      Comments: Ble without edema   Skin:     General: Skin is warm and dry.   Neurological:      Mental Status: He is alert and oriented to person, place, and time.   Psychiatric:         Mood and Affect: Mood normal.         Behavior: Behavior normal.         Last Recorded Vitals  Blood pressure 150/70, pulse 73, temperature 36.6 °C (97.9 °F), resp. rate 16, height 1.727 m (5' 8\"), weight 72.6 kg (160 lb), SpO2 97%.  Intake/Output last 3 Shifts:  I/O last 3 completed shifts:  In: 1100 (15.2 mL/kg) [I.V.:600 (8.3 mL/kg); Other:400; IV Piggyback:100]  Out: 2851 (39.3 mL/kg) [Other:2851]  Weight: 72.6 kg     Relevant Results  Scheduled medications  brimonidine, 1 drop, Right Eye, BID  bumetanide, 4 mg, intravenous, Once  carvedilol, 37.5 mg, oral, BID  cinacalcet, 30 mg, oral, Daily  [START ON 2/1/2025] epoetin aida or biosimilar, 8,000 Units, intravenous, Once per day on Tuesday Thursday Saturday  heparin (porcine), 5,000 Units, subcutaneous, q8h ZOË  imiquimod, 1 packet, Topical, Once per day on Monday Wednesday Friday  insulin glargine, 15 Units, subcutaneous, q AM  insulin lispro, 0-10 Units, subcutaneous, TID AC  insulin NPH (Isophane), 10 Units, subcutaneous, " q24h ZOË  isosorbide mononitrate ER, 60 mg, oral, Daily  ketorolac, 1 drop, Right Eye, TID  neomycin-polymyxin-dexAMETHasone, 1 drop, Right Eye, q8h ZOË  NIFEdipine ER, 90 mg, oral, BID  pantoprazole, 40 mg, oral, Daily before breakfast  paricalcitol, 10 mcg, intravenous, Once per day on Tuesday Thursday Saturday  pravastatin, 10 mg, oral, Nightly  predniSONE, 40 mg, oral, Daily   Followed by  [START ON 2/1/2025] predniSONE, 20 mg, oral, Daily   Followed by  [START ON 2/3/2025] predniSONE, 10 mg, oral, Daily   Followed by  [START ON 2/5/2025] predniSONE, 5 mg, oral, Daily  sevelamer carbonate, 800 mg, oral, TID AC  sodium zirconium cyclosilicate, 10 g, oral, q8h  tacrolimus, 0.5 mg, oral, BID  vancomycin, 125 mg, oral, q6h  vitamin B complex-vitamin C-folic acid, 1 capsule, oral, Daily      Continuous medications     PRN medications  PRN medications: albuterol, benzonatate, dextrose, dextrose, glucagon, glucagon, HYDROmorphone, oxygen, polyethylene glycol   Results for orders placed or performed during the hospital encounter of 01/26/25 (from the past 24 hours)   Blood Culture    Specimen: Peripheral Venipuncture; Blood culture   Result Value Ref Range    Blood Culture Loaded on Instrument - Culture in progress    POCT GLUCOSE   Result Value Ref Range    POCT Glucose 250 (H) 74 - 99 mg/dL   Blood Culture    Specimen: Peripheral Venipuncture; Blood culture   Result Value Ref Range    Blood Culture Loaded on Instrument - Culture in progress    POCT GLUCOSE   Result Value Ref Range    POCT Glucose 280 (H) 74 - 99 mg/dL   Magnesium   Result Value Ref Range    Magnesium 2.02 1.60 - 2.40 mg/dL   Renal Function Panel   Result Value Ref Range    Glucose 375 (H) 74 - 99 mg/dL    Sodium 132 (L) 136 - 145 mmol/L    Potassium 5.8 (H) 3.5 - 5.3 mmol/L    Chloride 95 (L) 98 - 107 mmol/L    Bicarbonate 25 21 - 32 mmol/L    Anion Gap 18 10 - 20 mmol/L    Urea Nitrogen 27 (H) 6 - 23 mg/dL    Creatinine 8.91 (H) 0.50 - 1.30 mg/dL     eGFR 6 (L) >60 mL/min/1.73m*2    Calcium 9.0 8.6 - 10.6 mg/dL    Phosphorus 3.5 2.5 - 4.9 mg/dL    Albumin 3.2 (L) 3.4 - 5.0 g/dL   CBC and Auto Differential   Result Value Ref Range    WBC 2.7 (L) 4.4 - 11.3 x10*3/uL    nRBC 0.0 0.0 - 0.0 /100 WBCs    RBC 3.13 (L) 4.50 - 5.90 x10*6/uL    Hemoglobin 9.0 (L) 13.5 - 17.5 g/dL    Hematocrit 28.7 (L) 41.0 - 52.0 %    MCV 92 80 - 100 fL    MCH 28.8 26.0 - 34.0 pg    MCHC 31.4 (L) 32.0 - 36.0 g/dL    RDW 16.3 (H) 11.5 - 14.5 %    Platelets 85 (L) 150 - 450 x10*3/uL    Neutrophils % 84.5 40.0 - 80.0 %    Immature Granulocytes %, Automated 0.7 0.0 - 0.9 %    Lymphocytes % 10.0 13.0 - 44.0 %    Monocytes % 4.8 2.0 - 10.0 %    Eosinophils % 0.0 0.0 - 6.0 %    Basophils % 0.0 0.0 - 2.0 %    Neutrophils Absolute 2.27 1.20 - 7.70 x10*3/uL    Immature Granulocytes Absolute, Automated 0.02 0.00 - 0.70 x10*3/uL    Lymphocytes Absolute 0.27 (L) 1.20 - 4.80 x10*3/uL    Monocytes Absolute 0.13 0.10 - 1.00 x10*3/uL    Eosinophils Absolute 0.00 0.00 - 0.70 x10*3/uL    Basophils Absolute 0.00 0.00 - 0.10 x10*3/uL   Tacrolimus level   Result Value Ref Range    Tacrolimus  <2.0 <=15.0 ng/mL   POCT GLUCOSE   Result Value Ref Range    POCT Glucose 374 (H) 74 - 99 mg/dL     *Note: Due to a large number of results and/or encounters for the requested time period, some results have not been displayed. A complete set of results can be found in Results Review.                       Assessment/Plan   Assessment & Plan  Shortness of breath    Tolerated hemodialysis yesterday with net fluid loss 2.4L    Bp elevated with tx (140//104), euvolemic on exam and has elevated potassium level. 1/30-pre-tx  K+= level at 5.1...  1/31 K+=5.8. (non-hemo) Lokelma order in for 10g  q8hrs a 6 doses... will cont to monitor results     Outpatient Dialysis schedule:    TTS [facility]/Dr Bridges     Access: lt fist with +thrill/bruit- no issues - able to achieve      Anemia of ESRD:1/31- epoetin aida  (Procrit) injection 8,000 Units .. Current hgb 9.0. will cont to monitor..( Mircera 150mcg q2wks op)       CKD-MBD Phosphate Binder:cinacalcet (Sensipar) tablet 30 mg daily, paricalcitoL (Zemplar) injection 10 mcg TTS, sevelamer carbonate (Renvela) tablet 800 mg tid ac, vitamin B complex-vitamin C-folic acid (Nephrocaps) capsule 1 capsule daily     Plan HD tomorrow with UF as tolerated     Renal diet      Please obtain daily standing wt (if possible)     Medication to be adjusted for ESRD      Patient to continue regular HD schedule while inpatient and to follow with the outpatient nephrologist at discharge        Subsequent visit renal  I spent 35 minutes in the professional and overall care of this patient.      ANÍBAL Paredes-CNP

## 2025-01-31 NOTE — PROGRESS NOTES
Physical Therapy                 Therapy Communication Note    Patient Name: Manolo Bashir  MRN: [id number]  Department: [place] [address]  Room: [address] [place], [place] [address]  Today's Date: 1/31/2025     Discipline: Physical Therapy    PT Missed Visit: Yes     Missed Visit Reason: Missed Visit Reason: Patient placed on medical hold (pt placed on hold per Rn as pt's BP is to kylah. Will reattempt as schedule permits and medically appropriate.)    Missed Time: Attempt    Comment:

## 2025-01-31 NOTE — PROGRESS NOTES
MEDICINE INPATIENT PROGRESS NOTE    Manolo Bashir is a 69 y.o. male on hospital day 5 who presented with Shortness of breath    Subjective   NAEO.    Patient seen this morning resting comfortably in bed. Reports feeling much better compared to yesterday, cough and SOB have improved and pain in LLQ has improved. Reports having 3 BM yesterday. Denies fever, chills, chest pain, dyspnea, nausea, vomiting, leg swelling. No additional questions or concerns at this time.           Objective     Last Recorded Vitals  Vitals:    01/31/25 0652 01/31/25 0944 01/31/25 0947 01/31/25 1135   BP: (!) 193/100 (!) 204/82  150/70   Pulse: 82  72 73   Resp: 16      Temp: 36.6 °C (97.9 °F)      TempSrc:       SpO2: 97%      Weight:       Height:           Intake/Output  I/O last 2 completed shifts:  In: 700 (9.6 mL/kg) [I.V.:200 (2.8 mL/kg); Other:400; IV Piggyback:100]  Out: 2851 (39.3 mL/kg) [Other:2851]  Weight: 72.6 kg     Physical Exam  Constitutional: Patient does not appear to be in any acute distress, Aox4  Cardio: RRR, S1/S2, no MRG  Pulm: CTAB no wheezes, normal respiratory effort  GI: +BS, soft, tender over LLQ (transplanted kidney) nondistended, no guarding or rebound, no masses noted  MSK: No joint swelling, multiple aneurysms and AV fistula noted on LUE  Skin: No lesions, contusions, or erythema.  Extremities: no BLE swelling   Neuro: No focal deficits  Psych: Appropriate mood and behavior    Labs    CBC  Results from last 7 days   Lab Units 01/31/25  0552 01/30/25  0634 01/29/25  0629 01/28/25  0550 01/27/25  0653   HEMOGLOBIN g/dL 9.0* 8.8* 9.1* 8.8* 8.9*   HEMATOCRIT % 28.7* 27.2* 28.0* 28.8* 27.1*   WBC AUTO x10*3/uL 2.7* 4.1* 3.4* 3.0* 3.6*   PLATELETS AUTO x10*3/uL 85* 82* 80* 59* 55*      BMP  Results from last 7 days   Lab Units 01/31/25  0552 01/30/25  0634 01/29/25  0629 01/28/25  0550   SODIUM mmol/L 132* 132* 134* 136   POTASSIUM mmol/L 5.8* 5.1 4.3 4.2   CHLORIDE mmol/L 95* 95* 95* 98   CO2 mmol/L 25 26 28  "29   BUN mg/dL 27* 43* 37* 27*   CREATININE mg/dL 8.91* 14.10* 10.91* 8.41*   MAGNESIUM mg/dL 2.02 1.76 1.89 1.95   PHOSPHORUS mg/dL 3.5 4.0 4.5 4.4   ANION GAP mmol/L 18 16 15 13   GLUCOSE mg/dL 375* 148* 171* 109*   CALCIUM mg/dL 9.0 8.9 8.7 8.5*     LFTS  Results from last 7 days   Lab Units 01/26/25  0622   ALT U/L 11   AST U/L 32   ALK PHOS U/L 70   BILIRUBIN TOTAL mg/dL 0.6     COAGS        No lab exists for component: \"PT\"     Micro  No results found for the last 90 days.       @(RESUFAST[URINECULTURE,BLOODCULT,CSFCULTSMEAR)@    Imaging  CT abdomen pelvis wo IV contrast    Result Date: 1/30/2025  1.  Edematous appearance of the left iliac fossa transplant kidney with surrounding mesenteric fat, not significantly changed compared to 01/05/2025 CT. Additionally, urinary bladder is decompressed, limiting evaluation, however there is unchanged marked perivesicular fat stranding scratch. Findings concerning for urinary tract infection/pyelonephritis for correlation with urinalysis. 2. Interval decrease in ground-glass opacities involving the bilateral lung bases. In the setting of cardiomegaly, increasing bilateral pleural effusions and interstitial septal thickening these findings are favored to represent pulmonary edema, however superimposed infection can not be excluded. 3. Additional stable chronic as detailed above, including mild splenomegaly.   I personally reviewed the images/study and I agree with the findings as stated by resident Jean Rowland. This study was interpreted at [place], [place].   MACRO: None   Signed by: Hue Aguilar 1/30/2025 9:44 AM Dictation workstation:   [id number]    XR chest 2 views    Result Date: 1/29/2025  1.  Almost complete interval resolution pulmonary edema, minimal on today's exam       MACRO: None   Signed by: Yohana Delgadillo 1/29/2025 2:56 PM Dictation workstation:   [id number]    XR chest 2 views    Result Date: " 1/26/2025  1.  Mild central pulmonary vascular congestion with patchy perihilar/bibasilar airspace opacities. Recommend correlation with patient's volume status as findings could reflect interstitial edema. Superimposed infectious process is not definitively excluded. 2. Small bilateral pleural effusions.   I personally reviewed the images/study and I agree with the findings as stated by resident Jean Rowland. This study was interpreted at [place], [place].   MACRO: None   Signed by: James Prasad 1/26/2025 9:31 AM Dictation workstation:   [id number]    CT abdomen pelvis wo IV contrast    Result Date: 1/5/2025  1. Interval development of marked fat stranding adjacent to the left iliac fossa renal transplant which appears edematous and heterogeneous, new compared to 09/10/2024 CT. No perinephric fluid collections. Findings concerning for urinary tract infection/pyelonephritis. Correlation with urinalysis is recommended. 2. Slight interval decrease in mild right pleural effusion with interval resolution of previously noted small left pleural effusion. Persistent bilateral ground-glass opacities with mild interlobular septal thickening, likely pulmonary edema. 3. Mild splenomegaly measuring up to 13.5 cm in craniocaudal dimension, which has improved compared to 14.5 cm on 09/10/2024 CT. 4. Additional findings as described above.   I personally reviewed the images/study and I agree with the findings as stated by Kendrick Purdy MD. This study was interpreted at [facility], [place], [place].   MACRO: None.   Signed by: Hue Aguilar 1/5/2025 2:17 PM Dictation workstation:   [id number]      Medications   Scheduled Medications  brimonidine, 1 drop, Right Eye, BID  bumetanide, 4 mg, intravenous, Once  calcium gluconate, 2 g, intravenous, Once  carvedilol, 37.5 mg, oral, BID  cinacalcet, 30 mg, oral, Daily  [START ON 2/1/2025] epoetin  aida or biosimilar, 8,000 Units, intravenous, Once per day on Tuesday Thursday Saturday  heparin (porcine), 5,000 Units, subcutaneous, q8h ZOË  imiquimod, 1 packet, Topical, Once per day on Monday Wednesday Friday  insulin glargine, 15 Units, subcutaneous, q AM  insulin lispro, 0-10 Units, subcutaneous, TID AC  insulin NPH (Isophane), 10 Units, subcutaneous, q24h [facility]  isosorbide mononitrate ER, 60 mg, oral, Daily  ketorolac, 1 drop, Right Eye, TID  neomycin-polymyxin-dexAMETHasone, 1 drop, Right Eye, q8h ZOË  NIFEdipine ER, 90 mg, oral, BID  pantoprazole, 40 mg, oral, Daily before breakfast  paricalcitol, 10 mcg, intravenous, Once per day on Tuesday Thursday Saturday  pravastatin, 10 mg, oral, Nightly  [START ON 2/1/2025] predniSONE, 20 mg, oral, Daily   Followed by  [START ON 2/3/2025] predniSONE, 10 mg, oral, Daily   Followed by  [START ON 2/5/2025] predniSONE, 5 mg, oral, Daily  sevelamer carbonate, 800 mg, oral, TID AC  sodium zirconium cyclosilicate, 10 g, oral, q8h  tacrolimus, 0.5 mg, oral, BID  vancomycin, 125 mg, oral, q6h  vitamin B complex-vitamin C-folic acid, 1 capsule, oral, Daily     Continuous Medications    PRN Medications  PRN medications: albuterol, benzonatate, dextrose, dextrose, glucagon, glucagon, HYDROmorphone, oxygen, polyethylene glycol          Assessment/Plan   Manolo Bashir is a 69 y.o. male with a PMH of ESRD (on dialysis, TThSa schedule, most recent dialysis 1/25), c/b impaired allograft function currently on immunosuppression (prednisone, tacrolimus), IgG and IgA lambda MGUS (BM biopsy 10/23 without evidence of myeloma), T2DM (on 20 units Lantus in AM, Lispro 3 units TID + SSI), HTN, prostate cancer (s/p radical prostatectomy 10/2013), HCV (s/p treatment, PCR negative 10/2023), and gastroparesis who presents for 1 day history of SOB, nausea/vomiting, diarrhea, and general malaise that started during dialysis on 1/25. Presentation is concerning for fluid overload 2/2 ESRD, however  cannot rule out cardiac etiologies of pulmonary edema. Also, given the patient's fever, history of immunocompromise, and elevated procalcitonin, there is concern for potential underlying pneumonia, however fever may also be elevated 2/2 chronic rejection. Currently being treated for C. Diff and pending potential nephrectomy next week.    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  #Chronic immunosuppression  #Elevated BNP  - Patient febrile on admission to 38.4  - Hx of ESRD 2/2 HTN s/p 2 allographic kidney transplants c/b chronic rejection, dialysis TThSa, has not missed dialysis sessions  - Most recent TTE 4/4/24, EF 54%, dilated/hypertrophic left ventricle, dilated LA/RA, mild to moderate aortic regurgitation  - QTc on admission 450 ms  -Procal elevated to 0.95  - Troponin trend 65 -> 87 -> 86  -MRSA nares negative 1/26  -TTE 1/28 without significant change from prior  -RCRI of 4 (intraperitoneal procedure, insulin-dependent T2DM, HFpEF, Cr > 2)  PLAN  - Transplant nephrology consulted, appreciate recommendations                - Continue normal dialysis schedule TThSa   - Prednisone taper started 1/30 (40 mg x 2 days, 20 mg x 2 days, 10 mg x 2 days, 5 mg x 2 days)  - Continue tacrolimus 0.5 mg BID  - Transplant surgery consulted for potential nephrectomy, appreciate recommendations   - Tentative plan for next week pending clinical improvement in C. Diff and potential pna  - Per nephrology:                - Continue DARIANA                 - Continue calcitriol, on parenteral zemplar OP                - Continue sevelamer 800 mg TID  - CTM daily RFP, CBC  - Strict I/Os  - Daily standing weights as tolerated    #Hyperkalemia  Potassium elevated to 5.8 1/31, patient asymptomatic however repeat EKG showed mildly peaked t-waves. Likely 2/2 CKD, however did have dialysis 1/30 so unclear if another factor is contributing to hyperkalemia. Repeat PM 4.8 following lokelma and insulin administration.  Plan  - Lokelma 10g q8h ordered for 6 doses total  - 2 g IV calcium gluconate ordered  - Plan for dialysis as scheduled 1/31  - CTM     #AHRF 2/2 pulmonary edema vs. Pneumonia, improved  - Patient hypoxic to 88% on RA on admission, required 3L supplemental O2 briefly in ED, was weaned off  - CXR on admission with evidence of pulmonary vascular congestion, pulmonary edema and pleural effusions, cannot rule out potential infiltrate  - COVID/Flu/RSV negative, other viral URI studies negative so far  Plan  - Tessalon Purls, guaifenesin, and albuterol nebs PRN for cough  - ID consulted, appreciate recommendations   - Not concerned for bacterial pneumonia so antibiotics discontinued  - Supplemental O2 PRN, goal SpO2 > 92%     #C. diff infection, recurrent  #Gastroparesis  #Nausea and vomiting, improved  #Diarrhea, improving  - C. Diff PCR +, toxin negative, however given risk factors of immunocompromise, recent abx, and frequent hospital/health care setting visits, will treat  - Hx of C. Diff in 2024, treated with PO vancomycin  - nausea/vomiting may be partially 2/2 chronic transplant rejection  PLAN  - Continue PO vancomycin 125 mg q6h x 10 days (through 2/6)  - Transplant ID consulted, appreciate recommendations                - CMV PCR ordered  - Stool O/P ordered, pending  - Giardia, cryptosporidium antigen pending  - Consider restarting metoclopramide if nausea/vomiting recur  - CTM stool output and abdominal exams  - Orthostatic vitals ordered     #IgG and IgA  lambda MGUS  #Thrombocytopenia  - Thrombocytopenia chronic, however baseline ~90s, may be decreased in the setting of viral infection vs. Immunosuppression vs. MGUS  - 4T score 3, did receive heparin on prior admission 1/5-1/8 but low suspicion for HIT  PLAN  - CTM on daily CBC  - Continue home cinacalcet 30 mg daily     #Hx of recent cataract surgery  - Continue home eye drops:                - Maxitrol 1 drop R eye q8h                - brimonidine 2% solution 1 drop R eye BID                - ketorolac 0.5% solution 1 drop R eye q8h     #HTN  - Increase nifedipine to 90 mg BID  - Continue home carvedilol 25 mg BID  - Continue home Imdur 60 mg daily  - May add PRN hydralazine 10 mg PO for SBP >180     #T2DM  - Most recent A1c 9.1 12/16/24  PLAN  - Continue Lantus 15 units in AM + #2 SSI  - Add 10 units NPH while on steroid taper, to be given with prednisone  - Consider initiation of glucommander while inpatient     #HLD  - Continue home pravastatin 10 mg daily     #GERD  - Continue home pantoprazole 40 mg daily    F: Replete PRN  E: Replete PRN  N: Adult diet Phosphorus restricted 1 gm, Renal, Consistent Carb; CCD 75 gm/meal; Potassium Restricted 2 gm (50mEq); 2 - 3 grams Sodium   A: PIV / Cedeño (date placed)    Oxygen: None  Abx: neomycin-polymyxin-dexAMETHasone - 3.5mg/mL-10,000 unit/mL-0.1 %, 3.5mg/mL-10,000 unit/mL-0.1 %  sulfamethoxazole-trimethoprim - 800-160 mg  vancomycin - 50 mg/mL     DVT Ppx: Heparin SubQ  GI Ppx: Pantoprazole  GI Laxative: Miralax     Code Status: full (confirmed on admission)  Surrogate Medical Decision-maker:  Amalia Enriquez ([phone])          Renay Cheung, MS4  Please Haiku with any questions or concerns.

## 2025-01-31 NOTE — PROGRESS NOTES
Physical Therapy                 Therapy Communication Note    Patient Name: Manolo Bashir  MRN: [id number]  Department: [place] [id number]  Room: [address] [place] [address]A  Today's Date: 1/31/2025     Discipline: Physical Therapy    PT Missed Visit: Yes     Missed Visit Reason: Missed Visit Reason: Cancel (pt I and up ad lisa in room. Without needs at this time. Will cancel orders. Please reconsult if status should change.)    Missed Time: Cancel    Comment:

## 2025-01-31 NOTE — PROGRESS NOTES
Manolo Bashir is a 69 y.o. male on day 5 of admission presenting with Shortness of breath.    Subjective   Interval History: Patient notes that he feels much improved than yesterday. Denies any fever or chills but continues to have significant watery bowel movements. Patient does note that he has minimal urine but that at times there is blood in the urine. He notes that cramping  is much improved        Review of Systems    Review of systems otherwise negative    Objective   Range of Vitals (last 24 hours)  Heart Rate:  [72-83]   Temp:  [36.6 °C (97.9 °F)-38.4 °C (101.1 °F)]   Resp:  [16-20]   BP: (121-204)/()   SpO2:  [94 %-98 %]   Daily Weight  01/26/25 : 72.6 kg (160 lb)    Body mass index is 24.33 kg/m².    Physical Exam  General: in no acute distress, able to answer questions  HEENT: no conjunctival injection. anicteric.  CVS: RRR. Normal S1 and S2. No m/r/g.   RESP: ctab no w/r/r, no increased wob, some crackles on left lower side  Abd: Soft and lax. ND. Bowel sounds present  Ext: No swelling of the LE b/l.   Neuro: Answers questions appropriately.  Integumentary: no obvious lesions     Antibiotics  neomycin-polymyxin-dexAMETHasone - 3.5mg/mL-10,000 unit/mL-0.1 %, 3.5mg/mL-10,000 unit/mL-0.1 %  sulfamethoxazole-trimethoprim - 800-160 mg  vancomycin - 50 mg/mL    Relevant Results  Labs  Results from last 72 hours   Lab Units 01/31/25  0552 01/30/25  0634 01/29/25  0629   WBC AUTO x10*3/uL 2.7* 4.1* 3.4*   HEMOGLOBIN g/dL 9.0* 8.8* 9.1*   HEMATOCRIT % 28.7* 27.2* 28.0*   PLATELETS AUTO x10*3/uL 85* 82* 80*   NEUTROS PCT AUTO % 84.5 70.4 68.5   LYMPHS PCT AUTO % 10.0 16.2 15.0   MONOS PCT AUTO % 4.8 10.0 12.4   EOS PCT AUTO % 0.0 2.7 3.2     Results from last 72 hours   Lab Units 01/31/25  0552 01/30/25  0634 01/29/25  0629   SODIUM mmol/L 132* 132* 134*   POTASSIUM mmol/L 5.8* 5.1 4.3   CHLORIDE mmol/L 95* 95* 95*   CO2 mmol/L 25 26 28   BUN mg/dL 27* 43* 37*   CREATININE mg/dL 8.91* 14.10* 10.91*    GLUCOSE mg/dL 375* 148* 171*   CALCIUM mg/dL 9.0 8.9 8.7   ANION GAP mmol/L 18 16 15   EGFR mL/min/1.73m*2 6* 3* 5*   PHOSPHORUS mg/dL 3.5 4.0 4.5     Results from last 72 hours   Lab Units 01/31/25  0552 01/30/25  0634 01/29/25  0629   ALBUMIN g/dL 3.2* 3.1* 3.2*     Estimated Creatinine Clearance: 7.7 mL/min (A) (by C-G formula based on SCr of 8.91 mg/dL (H)).  C-Reactive Protein   Date Value Ref Range Status   02/01/2024 0.57 <1.00 mg/dL Final     CRP   Date Value Ref Range Status   10/20/2022 3.17 (A) mg/dL Final     Comment:     REF VALUE  < 1.00     10/15/2022 25.57 (A) mg/dL Final     Comment:     REF VALUE  < 1.00       Microbiology  No results found for the last 14 days.  CMV <35   1/30 BC NGTD  1/27 C. Diff negative EIA, positive PCR  1/27 pathogen panel negative    Imaging    CTAP  1.  Edematous appearance of the left iliac fossa transplant kidney  with surrounding mesenteric fat, not significantly changed compared  to 01/05/2025 CT. Additionally, urinary bladder is decompressed,  limiting evaluation, however there is unchanged marked perivesicular  fat stranding scratch. Findings concerning for urinary tract  infection/pyelonephritis for correlation with urinalysis.  2. Interval decrease in ground-glass opacities involving the  bilateral lung bases. In the setting of cardiomegaly, increasing  bilateral pleural effusions and interstitial septal thickening these  findings are favored to represent pulmonary edema, however  superimposed infection can not be excluded.  3. Additional stable chronic as detailed above, including mild  splenomegaly.          Assessment/Plan     69 y.o man with PMH ESRD s/p failed kidney transplant with ongoing rejected, on HD via AVG admitted with cough and diarrhea.     #Cough  #Diarrhea  #Positive C. Diff PCR, negative EIA  #ESRD failed kidney transplant on HD    Patient with cough and diarrhea. The diarrhea could be from c. Diff although EIA was negative. Patient notes that  his cough is dry and not asssociated with significant sputum. He has improved since admission so this could be viral in nature. Given the hyponatremia, diarrhea, and cough will ask for urine legionella although this may not be feasible with his urine output and also is low likelihood given lack of oxygen requirement and appearing clinically well. Patient was tested for CMV and this was negative    -Continue oral vancomycin 125 q 6 hours  -Could consider urine legionella testing if any urine is produced by patient    ID will continue to follow. If any questions regarding this patient please ryne Simons  Infectious Diseases  Team C

## 2025-02-01 ENCOUNTER — APPOINTMENT (OUTPATIENT)
Dept: DIALYSIS | Facility: HOSPITAL | Age: 70
End: 2025-02-01
Payer: MEDICARE

## 2025-02-01 LAB
ALBUMIN SERPL BCP-MCNC: 3.1 G/DL (ref 3.4–5)
ANION GAP SERPL CALC-SCNC: 16 MMOL/L (ref 10–20)
BASOPHILS # BLD AUTO: 0 X10*3/UL (ref 0–0.1)
BASOPHILS NFR BLD AUTO: 0 %
BUN SERPL-MCNC: 40 MG/DL (ref 6–23)
CALCIUM SERPL-MCNC: 9.3 MG/DL (ref 8.6–10.6)
CHLORIDE SERPL-SCNC: 91 MMOL/L (ref 98–107)
CO2 SERPL-SCNC: 27 MMOL/L (ref 21–32)
CREAT SERPL-MCNC: 11.01 MG/DL (ref 0.5–1.3)
EGFRCR SERPLBLD CKD-EPI 2021: 5 ML/MIN/1.73M*2
EOSINOPHIL # BLD AUTO: 0 X10*3/UL (ref 0–0.7)
EOSINOPHIL NFR BLD AUTO: 0 %
ERYTHROCYTE [DISTWIDTH] IN BLOOD BY AUTOMATED COUNT: 16 % (ref 11.5–14.5)
GLUCOSE BLD MANUAL STRIP-MCNC: 160 MG/DL (ref 74–99)
GLUCOSE BLD MANUAL STRIP-MCNC: 204 MG/DL (ref 74–99)
GLUCOSE BLD MANUAL STRIP-MCNC: 260 MG/DL (ref 74–99)
GLUCOSE SERPL-MCNC: 349 MG/DL (ref 74–99)
HCT VFR BLD AUTO: 26.3 % (ref 41–52)
HGB BLD-MCNC: 8.6 G/DL (ref 13.5–17.5)
IMM GRANULOCYTES # BLD AUTO: 0.01 X10*3/UL (ref 0–0.7)
IMM GRANULOCYTES NFR BLD AUTO: 0.3 % (ref 0–0.9)
LYMPHOCYTES # BLD AUTO: 0.37 X10*3/UL (ref 1.2–4.8)
LYMPHOCYTES NFR BLD AUTO: 11.7 %
MAGNESIUM SERPL-MCNC: 2.07 MG/DL (ref 1.6–2.4)
MCH RBC QN AUTO: 28.9 PG (ref 26–34)
MCHC RBC AUTO-ENTMCNC: 32.7 G/DL (ref 32–36)
MCV RBC AUTO: 88 FL (ref 80–100)
MONOCYTES # BLD AUTO: 0.31 X10*3/UL (ref 0.1–1)
MONOCYTES NFR BLD AUTO: 9.8 %
NEUTROPHILS # BLD AUTO: 2.48 X10*3/UL (ref 1.2–7.7)
NEUTROPHILS NFR BLD AUTO: 78.2 %
NRBC BLD-RTO: 0 /100 WBCS (ref 0–0)
PHOSPHATE SERPL-MCNC: 4.4 MG/DL (ref 2.5–4.9)
PLATELET # BLD AUTO: 160 X10*3/UL (ref 150–450)
POTASSIUM SERPL-SCNC: 4.5 MMOL/L (ref 3.5–5.3)
RBC # BLD AUTO: 2.98 X10*6/UL (ref 4.5–5.9)
SODIUM SERPL-SCNC: 129 MMOL/L (ref 136–145)
WBC # BLD AUTO: 3.2 X10*3/UL (ref 4.4–11.3)

## 2025-02-01 PROCEDURE — 8010000001 HC DIALYSIS - HEMODIALYSIS PER DAY

## 2025-02-01 PROCEDURE — 90935 HEMODIALYSIS ONE EVALUATION: CPT | Performed by: INTERNAL MEDICINE

## 2025-02-01 PROCEDURE — 2500000004 HC RX 250 GENERAL PHARMACY W/ HCPCS (ALT 636 FOR OP/ED): Performed by: NURSE PRACTITIONER

## 2025-02-01 PROCEDURE — 2500000005 HC RX 250 GENERAL PHARMACY W/O HCPCS

## 2025-02-01 PROCEDURE — 2500000004 HC RX 250 GENERAL PHARMACY W/ HCPCS (ALT 636 FOR OP/ED): Performed by: INTERNAL MEDICINE

## 2025-02-01 PROCEDURE — 85025 COMPLETE CBC W/AUTO DIFF WBC: CPT

## 2025-02-01 PROCEDURE — 6340000001 HC RX 634 EPOETIN <10,000 UNITS: Mod: JZ,TB | Performed by: NURSE PRACTITIONER

## 2025-02-01 PROCEDURE — 2500000002 HC RX 250 W HCPCS SELF ADMINISTERED DRUGS (ALT 637 FOR MEDICARE OP, ALT 636 FOR OP/ED)

## 2025-02-01 PROCEDURE — 1100000001 HC PRIVATE ROOM DAILY

## 2025-02-01 PROCEDURE — 83735 ASSAY OF MAGNESIUM: CPT

## 2025-02-01 PROCEDURE — 36415 COLL VENOUS BLD VENIPUNCTURE: CPT

## 2025-02-01 PROCEDURE — 2500000004 HC RX 250 GENERAL PHARMACY W/ HCPCS (ALT 636 FOR OP/ED)

## 2025-02-01 PROCEDURE — 80069 RENAL FUNCTION PANEL: CPT

## 2025-02-01 PROCEDURE — 2500000001 HC RX 250 WO HCPCS SELF ADMINISTERED DRUGS (ALT 637 FOR MEDICARE OP)

## 2025-02-01 PROCEDURE — 99232 SBSQ HOSP IP/OBS MODERATE 35: CPT

## 2025-02-01 PROCEDURE — 99232 SBSQ HOSP IP/OBS MODERATE 35: CPT | Performed by: STUDENT IN AN ORGANIZED HEALTH CARE EDUCATION/TRAINING PROGRAM

## 2025-02-01 PROCEDURE — 82947 ASSAY GLUCOSE BLOOD QUANT: CPT

## 2025-02-01 RX ADMIN — KETOROLAC TROMETHAMINE 1 DROP: 5 SOLUTION OPHTHALMIC at 03:54

## 2025-02-01 RX ADMIN — PRAVASTATIN SODIUM 10 MG: 10 TABLET ORAL at 22:03

## 2025-02-01 RX ADMIN — INSULIN HUMAN 10 UNITS: 100 INJECTION, SUSPENSION SUBCUTANEOUS at 13:48

## 2025-02-01 RX ADMIN — TACROLIMUS 0.5 MG: 0.5 CAPSULE ORAL at 22:04

## 2025-02-01 RX ADMIN — SEVELAMER CARBONATE 800 MG: 800 TABLET, FILM COATED ORAL at 17:31

## 2025-02-01 RX ADMIN — SODIUM ZIRCONIUM CYCLOSILICATE 10 G: 10 POWDER, FOR SUSPENSION ORAL at 03:50

## 2025-02-01 RX ADMIN — NIFEDIPINE 90 MG: 90 TABLET, FILM COATED, EXTENDED RELEASE ORAL at 22:03

## 2025-02-01 RX ADMIN — RENO CAPS 1 CAPSULE: 100; 1.5; 1.7; 20; 10; 1; 150; 5; 6 CAPSULE ORAL at 13:10

## 2025-02-01 RX ADMIN — NEOMYCIN SULFATE, POLYMYXIN B SULFATE AND DEXAMETHASONE 1 DROP: 3.5; 10000; 1 SUSPENSION OPHTHALMIC at 22:04

## 2025-02-01 RX ADMIN — VANCOMYCIN HYDROCHLORIDE 125 MG: KIT at 18:46

## 2025-02-01 RX ADMIN — BRIMONIDINE TARTRATE 1 DROP: 2 SOLUTION/ DROPS OPHTHALMIC at 13:24

## 2025-02-01 RX ADMIN — VANCOMYCIN HYDROCHLORIDE 125 MG: KIT at 13:09

## 2025-02-01 RX ADMIN — KETOROLAC TROMETHAMINE 1 DROP: 5 SOLUTION OPHTHALMIC at 22:04

## 2025-02-01 RX ADMIN — INSULIN GLARGINE 15 UNITS: 100 INJECTION, SOLUTION SUBCUTANEOUS at 13:47

## 2025-02-01 RX ADMIN — PREDNISONE 20 MG: 20 TABLET ORAL at 13:09

## 2025-02-01 RX ADMIN — SODIUM ZIRCONIUM CYCLOSILICATE 10 G: 10 POWDER, FOR SUSPENSION ORAL at 22:04

## 2025-02-01 RX ADMIN — HEPARIN SODIUM 5000 UNITS: 5000 INJECTION, SOLUTION INTRAVENOUS; SUBCUTANEOUS at 13:09

## 2025-02-01 RX ADMIN — PANTOPRAZOLE SODIUM 40 MG: 40 TABLET, DELAYED RELEASE ORAL at 06:33

## 2025-02-01 RX ADMIN — BRIMONIDINE TARTRATE 1 DROP: 2 SOLUTION/ DROPS OPHTHALMIC at 22:04

## 2025-02-01 RX ADMIN — CARVEDILOL 37.5 MG: 12.5 TABLET, FILM COATED ORAL at 13:09

## 2025-02-01 RX ADMIN — NEOMYCIN SULFATE, POLYMYXIN B SULFATE AND DEXAMETHASONE 1 DROP: 3.5; 10000; 1 SUSPENSION OPHTHALMIC at 13:24

## 2025-02-01 RX ADMIN — VANCOMYCIN HYDROCHLORIDE 125 MG: KIT at 06:33

## 2025-02-01 RX ADMIN — HEPARIN SODIUM 5000 UNITS: 5000 INJECTION, SOLUTION INTRAVENOUS; SUBCUTANEOUS at 22:19

## 2025-02-01 RX ADMIN — PARICALCITOL 10 MCG: 5 INJECTION, SOLUTION INTRAVENOUS at 17:31

## 2025-02-01 RX ADMIN — EPOETIN ALFA-EPBX 8000 UNITS: 4000 INJECTION, SOLUTION INTRAVENOUS; SUBCUTANEOUS at 18:46

## 2025-02-01 RX ADMIN — CINACALCET HYDROCHLORIDE 30 MG: 30 TABLET, FILM COATED ORAL at 13:10

## 2025-02-01 RX ADMIN — INSULIN LISPRO 6 UNITS: 100 INJECTION, SOLUTION INTRAVENOUS; SUBCUTANEOUS at 17:31

## 2025-02-01 RX ADMIN — KETOROLAC TROMETHAMINE 1 DROP: 5 SOLUTION OPHTHALMIC at 13:24

## 2025-02-01 RX ADMIN — ISOSORBIDE MONONITRATE 60 MG: 30 TABLET, EXTENDED RELEASE ORAL at 13:10

## 2025-02-01 RX ADMIN — SODIUM ZIRCONIUM CYCLOSILICATE 10 G: 10 POWDER, FOR SUSPENSION ORAL at 13:10

## 2025-02-01 RX ADMIN — TACROLIMUS 0.5 MG: 0.5 CAPSULE ORAL at 13:10

## 2025-02-01 RX ADMIN — NEOMYCIN SULFATE, POLYMYXIN B SULFATE AND DEXAMETHASONE 1 DROP: 3.5; 10000; 1 SUSPENSION OPHTHALMIC at 03:54

## 2025-02-01 RX ADMIN — NIFEDIPINE 90 MG: 90 TABLET, FILM COATED, EXTENDED RELEASE ORAL at 13:10

## 2025-02-01 RX ADMIN — CARVEDILOL 37.5 MG: 12.5 TABLET, FILM COATED ORAL at 22:03

## 2025-02-01 RX ADMIN — SEVELAMER CARBONATE 800 MG: 800 TABLET, FILM COATED ORAL at 13:09

## 2025-02-01 ASSESSMENT — COGNITIVE AND FUNCTIONAL STATUS - GENERAL
DAILY ACTIVITIY SCORE: 24
CLIMB 3 TO 5 STEPS WITH RAILING: A LITTLE
MOBILITY SCORE: 23

## 2025-02-01 ASSESSMENT — PAIN SCALES - GENERAL: PAINLEVEL_OUTOF10: 0 - NO PAIN

## 2025-02-01 ASSESSMENT — PAIN - FUNCTIONAL ASSESSMENT: PAIN_FUNCTIONAL_ASSESSMENT: NO/DENIES PAIN

## 2025-02-01 NOTE — NURSING NOTE
[address] [facility]  Progress Note - Baljinder Gonzalez 1967, 48 y o  male MRN: [id number]  Unit/Bed#: -[facility] Encounter: [id number]  Primary Care Provider: Tyrel Kebede MD   Date and time admitted to hospital: 3/24/2022  5:51 PM    * Cellulitis of left lower extremity  Assessment & Plan  · Patient presented to the ED with complaints of left foot swelling, redness and pain x 6 days  Was seen by PCP and treated with oral doxy on 3/16 without improvement of symptoms  · Does have history of chronic bilateral lower extremity swelling/pain due to venous insufficiency  · Left foot x-ray (3/24/22): Soft tissue swelling; No acute osseous abnormality  · Left tibia/fibula x-ray (3/24/22): No acute osseous abnormality  · Bilateral lower extremity venous duplex completed (3/24/22): Negative for DVT    Patient does report improvement since he has been on IV antibiotics  Currently febrile  acute abnormal significantly  Blood cultures x 2 pending  MRSA swab pending  Continue IV Cefazolin at this time  Continue with supportive care,      SIRS (systemic inflammatory response syndrome) (HCC)  Assessment & Plan  · Meets SIRS criteria on admission with tachycardia/tachypnea  · Etiology unknown - consider pain vs infection  · Currently on IV Cefazolin at this time  Remains afebrile, no leukocytosis  Chronic kidney disease, stage 3 [facility])  Assessment & Plan  Lab Results   Component Value Date    EGFR 52 03/25/2022    EGFR 45 03/24/2022    EGFR 57 09/23/2020    CREATININE 1 50 (H) 03/25/2022    CREATININE 1 67 (H) 03/24/2022    CREATININE 1 58 (H) 09/23/2020     · History of Lupus nephritis  · Creatinine 1 67; was 1 9 on 03/01, with baseline difficult to establish however may be around 1 5  · Creatinine stable      History of DVT (deep vein thrombosis)  Assessment & Plan  · History of DVTaround 2yr ago, currently on xarelto   · Continue home xarelto   · Bilateral venous duplex negative for Report to Receiving RN:    Report To: AYO Frederick  Time Report Called: [id number]  Hand-Off Communication: Pt completed 3.75 hrs HD, 2L fluid removed, tolerated tx, /91, HR 80, pt stable, A/O.  Complications During Treatment: No  Ultrafiltration Treatment: Yes  Medications Administered During Dialysis: No  Blood Products Administered During Dialysis: No  Labs Sent During Dialysis: No  Heparin Drip Rate Changes: No  Dialysis Catheter Dressing: N/A, AVF  Last Dressing Change: N/A    Last Updated: 12:24 PM by SUJIT WAYNE           DVT     Hypertension  Assessment & Plan  · Chronic  · Reports not taking his carvedilol or lisinopril anymore  · Continue home Lasix   · Monitor BP per unit protocol    Lupus (systemic lupus erythematosus) ([facility] [address] )  Assessment & Plan  · Follows with Denys as an outpatient  · Per chart review was taking CellCept as an outpatient but stopped taking it on his own  · Per chart review appears as though rheumatology is considering restarting the CellCept  · Currently taking plaquenil 400mg qHS, and 5mg daily prednisone; continue both  · Continue outpatient follow-up      VTE Pharmacologic Prophylaxis: VTE Score: 5 Moderate Risk (Score 3-4) - Pharmacological DVT Prophylaxis Ordered: rivaroxaban (Xarelto)  Patient Centered Rounds: I performed bedside rounds with nursing staff today  Education and Discussions with Family / Patient: Attempted to update  (wife) via phone  Unable to contact  left a voice message [phone] around 1:18 pm      Time Spent for Care: 30 minutes  More than 50% of total time spent on counseling and coordination of care as described above  Current Length of Stay: 1 day(s)  Current Patient Status: Inpatient   Certification Statement: The patient will continue to require additional inpatient hospital stay due to IV antibiotics  Discharge Plan: Anticipate discharge in 24-48 hrs to home  Code Status: Level 1 - Full Code    Subjective:   Afebrile, hemodynamically stable  Reports left lower extremity pain has improved since on IV antibiotics  Denies chest pain, dyspnea, fever, chills, nausea, vomiting, diarrhea, any numbness  No other events reported  Objective:     Vitals:   Temp (24hrs), Av 6 °F (37 °C), Min:98 4 °F (36 9 °C), Max:98 8 °F (37 1 °C)    Temp:  [98 4 °F (36 9 °C)-98 8 °F (37 1 °C)] 98 8 °F (37 1 °C)  HR:  [86-91] 86  Resp:  [14-22] 22  BP: (115-140)/(68-86) 140/86  SpO2:  [94 %-99 %] 94 %  Body mass index is 34 44 kg/m²       Input and Output Summary (last 24 hours): Intake/Output Summary (Last 24 hours) at 3/25/2022 1312  Last data filed at 3/25/2022 1101  Gross per 24 hour   Intake 2450 ml   Output 1900 ml   Net 550 ml       Physical Exam:   Physical Exam  Constitutional:       General: He is not in acute distress  Appearance: Normal appearance  He is not ill-appearing  HENT:      Head: Normocephalic and atraumatic  Eyes:      Pupils: Pupils are equal, round, and reactive to light  Cardiovascular:      Rate and Rhythm: Normal rate  Pulses: Normal pulses  Pulmonary:      Effort: Pulmonary effort is normal  No respiratory distress  Breath sounds: Normal breath sounds  No stridor  No wheezing or rhonchi  Abdominal:      General: Bowel sounds are normal  There is no distension  Palpations: Abdomen is soft  Tenderness: There is no abdominal tenderness  Musculoskeletal:      Cervical back: Normal range of motion and neck supple  Right lower leg: Edema present  Left lower leg: Edema present  Comments: Chronic bilateral extremity lymphedema  Left lower extremity note with mile tenderness on exam, no ertyhema noted, no open wound noted  Able to move all toes and capillary refill intact  Skin:     Findings: No rash  Neurological:      General: No focal deficit present  Mental Status: He is alert and oriented to person, place, and time  Mental status is at baseline     Psychiatric:         Mood and Affect: Mood normal          Behavior: Behavior normal          Additional Data:     Labs:  Results from last 7 days   Lab Units 03/25/22  0507 03/24/22  1824 03/24/22  1824   WBC Thousand/uL 3 89*   < > 5 18   HEMOGLOBIN g/dL 10 1*   < > 12 8   HEMATOCRIT % 28 7*   < > 36 5   PLATELETS Thousands/uL 245   < > 295   NEUTROS PCT %  --   --  69   LYMPHS PCT %  --   --  17   LYMPHO PCT % 18  --   --    MONOS PCT %  --   --  13*   MONO PCT % 24*  --   --    EOS PCT % 0  --  0    < > = values in this interval not displayed  Results from last 7 days   Lab Units 03/25/22  0507 03/24/22  1824 03/24/22  1824   SODIUM mmol/L 132*   < > 129*   POTASSIUM mmol/L 3 8   < > 3 3*   CHLORIDE mmol/L 98*   < > 91*   CO2 mmol/L 26   < > 27   BUN mg/dL 36*   < > 45*   CREATININE mg/dL 1 50*   < > 1 67*   ANION GAP mmol/L 8   < > 11   CALCIUM mg/dL 8 6   < > 9 4   ALBUMIN g/dL  --   --  4 0   TOTAL BILIRUBIN mg/dL  --   --  0 50   ALK PHOS U/L  --   --  121*   ALT U/L  --   --  28   AST U/L  --   --  34   GLUCOSE RANDOM mg/dL 93   < > 102    < > = values in this interval not displayed  Results from last 7 days   Lab Units 03/24/22  1824   LACTIC ACID mmol/L 0 9       Lines/Drains:  Invasive Devices  Report    Peripheral Intravenous Line            Peripheral IV 03/24/22 Left Antecubital <1 day                  Recent Cultures (last 7 days):   Results from last 7 days   Lab Units 03/24/22 2107   BLOOD CULTURE  Received in Microbiology Lab  Culture in Progress  Received in Microbiology Lab  Culture in Progress         Last 24 Hours Medication List:   Current Facility-Administered Medications   Medication Dose Route Frequency Provider Last Rate    acetaminophen  650 mg Oral Q6H PRN Thermon Imam, MACO      baclofen  20 mg Oral TID PRN Thermon ImamMACO      cefazolin  2,000 mg Intravenous Q8H Thermon DINA MontesC 2,000 mg (03/25/22 0415)    escitalopram  10 mg Oral Daily [place], [place]      furosemide  20 mg Oral Daily [place], [place]      [facility]  900 [facility]ID [facility]      [facility]lor[facility]  [facility] [place]      hydrOXYzine HCL  10 mg Oral Q6H [facility]N [facility]      metolazone  2 5 mg Oral Daily [place], [place]      multi-electrolyte  75 mL/hr Intravenous Continuous [place], [place] 75 mL/hr (03/24/22 3570)    potassium chloride  20 mEq Oral Daily [place], [place]      predniSONE  5 mg Oral Daily Thermon Imam, MACO      rivaroxaban  20 mg Oral Daily With [address] [place], [place]      tamsulosin  0 8 mg Oral Once HS Mckayla Montano PA-C          Today, Patient Was Seen By: Alina Beverly MD    **Please Note: This note may have been constructed using a voice recognition system  **

## 2025-02-01 NOTE — PROCEDURES
Seen and examined on HD  Diarrhea improving but had 4 Bms over 24 hrs  No complaints  Tolerating HD well per submitted orders   Next HD on 2/4/25 1/31/2025     9:44 AM 1/31/2025     9:47 AM 1/31/2025    11:35 AM 1/31/2025     3:13 PM 1/31/2025     7:48 PM 2/1/2025     5:37 AM 2/1/2025     8:30 AM   Vitals   Systolic 204  150 140 134 135    Diastolic 82  70 67 62 68    Heart Rate  72 73 70 74 67 73   Temp    37 °C (98.6 °F) 37.1 °C (98.8 °F) 36.4 °C (97.5 °F) 36.2 °C (97.2 °F)   Resp    18 16 16      No distress  HEENT:  moist, no pallor  No edema sohail LE. LUE AVF with large aneurysms  Breath sounds sohail equal, clear  S1 S2 regular, normal, no rub or murmur  Abdomen soft, non tender  AAO x3, non focal    Results from last 72 hours   Lab Units 02/01/25  0559 01/31/25  1345 01/31/25  0552 01/30/25  1829 01/30/25  1733 01/30/25  0634   SODIUM mmol/L 129* 133* 132*  --   --  132*   POTASSIUM mmol/L 4.5 4.8 5.8*  --   --  5.1   CHLORIDE mmol/L 91* 93* 95*  --   --  95*   CO2 mmol/L 27 31 25  --   --  26   BUN mg/dL 40* 30* 27*  --   --  43*   PHOSPHORUS mg/dL 4.4 3.9 3.5  --   --  4.0   HEMOGLOBIN g/dL 8.6*  --  9.0*  --   --  8.8*   BLOOD CULTURE   --   --   --  No growth at 1 day No growth at 1 day  --         Current Facility-Administered Medications:     albuterol 90 mcg/actuation inhaler 2 puff, 2 puff, inhalation, q6h PRN, Patricia Johnson MD, 2 puff at 01/31/25 0043    benzonatate (Tessalon) capsule 100 mg, 100 mg, oral, TID PRN, Patricia Johnson MD, 100 mg at 01/31/25 2341    brimonidine (AlphaGAN) 0.2 % ophthalmic solution 1 drop, 1 drop, Right Eye, BID, Edward Herman MD, 1 drop at 01/31/25 2050    carvedilol (Coreg) tablet 37.5 mg, 37.5 mg, oral, BID, Edward Herman MD, 37.5 mg at 01/31/25 2043    cinacalcet (Sensipar) tablet 30 mg, 30 mg, oral, Daily, Edward Herman MD, 30 mg at 01/31/25 0948    dextrose 50 % injection 12.5 g, 12.5 g, intravenous, q15 min PRN, Edward Herman MD    dextrose 50 %  injection 25 g, 25 g, intravenous, q15 min PRN, Edward Herman MD    epoetin aida-epbx (Retacrit) injection 8,000 Units, 8,000 Units, intravenous, Once per day on Tuesday Thursday Saturday, ANÍBAL Paredes-CNP    glucagon (Glucagen) injection 1 mg, 1 mg, intramuscular, q15 min PRN, Edward Herman MD    glucagon (Glucagen) injection 1 mg, 1 mg, intramuscular, q15 min PRN, Edward Herman MD    heparin (porcine) injection 5,000 Units, 5,000 Units, subcutaneous, q8h ZOË, Edward Herman MD, 5,000 Units at 01/31/25 2044    HYDROmorphone (Dilaudid) injection 0.4 mg, 0.4 mg, intravenous, q3h PRN, Betsy Degroot MD, 0.4 mg at 01/30/25 0414    imiquimod (Aldara) 5 % cream 1 packet, 1 packet, Topical, Once per day on Monday Wednesday Friday, Edward Herman MD, 1 packet at 01/31/25 1307    insulin glargine (Lantus) injection 15 Units, 15 Units, subcutaneous, q AM, Edward Herman MD, 15 Units at 01/31/25 0954    insulin lispro injection 0-10 Units, 0-10 Units, subcutaneous, TID AC, Edward Herman MD, 4 Units at 01/31/25 1751    insulin NPH (Isophane) (HumuLIN N,NovoLIN N) injection 10 Units, 10 Units, subcutaneous, q24h ZOË, Patricia Johnson MD, 10 Units at 01/31/25 1316    isosorbide mononitrate ER (Imdur) 24 hr tablet 60 mg, 60 mg, oral, Daily, Edward Herman MD, 60 mg at 01/31/25 0947    ketorolac (Acular) 0.5 % ophthalmic solution 1 drop, 1 drop, Right Eye, TID, Edward Herman MD, 1 drop at 02/01/25 0354    neomycin-polymyxin-dexAMETHasone (Maxitrol) 3.5mg/mL-10,000 unit/mL-0.1 % ophthalmic suspension 1 drop, 1 drop, Right Eye, q8h ZOË, Edward Herman MD, 1 drop at 02/01/25 0354    NIFEdipine ER (Adalat CC) 24 hr tablet 90 mg, 90 mg, oral, BID, Patricia CAREY Johnson MD, 90 mg at 01/31/25 2047    oxygen (O2) therapy, , inhalation, Continuous PRN - O2/gases, Edward Herman MD    pantoprazole (ProtoNix) EC tablet 40 mg, 40 mg, oral, Daily before breakfast, Edward Herman MD, 40 mg at 02/01/25 0154     paricalcitoL (Zemplar) injection 10 mcg, 10 mcg, intravenous, Once per day on Tuesday Thursday Saturday, ANÍBAL Paredes-CNP, 10 mcg at 01/30/25 1649    polyethylene glycol (Glycolax, Miralax) packet 17 g, 17 g, oral, Daily PRN, Edward Herman MD    pravastatin (Pravachol) tablet 10 mg, 10 mg, oral, Nightly, Edward Herman MD, 10 mg at 01/31/25 2044    [COMPLETED] predniSONE (Deltasone) tablet 40 mg, 40 mg, oral, Daily, 40 mg at 01/31/25 1308 **FOLLOWED BY** predniSONE (Deltasone) tablet 20 mg, 20 mg, oral, Daily **FOLLOWED BY** [START ON 2/3/2025] predniSONE (Deltasone) tablet 10 mg, 10 mg, oral, Daily **FOLLOWED BY** [START ON 2/5/2025] predniSONE (Deltasone) tablet 5 mg, 5 mg, oral, Daily, Patricia Johnson MD    sevelamer carbonate (Renvela) tablet 800 mg, 800 mg, oral, TID AC, Petty Valdes MD, 800 mg at 01/31/25 1751    sodium zirconium cyclosilicate (Lokelma) packet 10 g, 10 g, oral, q8h, Patricia Johnson MD, 10 g at 02/01/25 0350    tacrolimus (Prograf) capsule 0.5 mg, 0.5 mg, oral, BID, Edward Herman MD, 0.5 mg at 01/31/25 2044    vancomycin (Firvanq) solution 125 mg, 125 mg, oral, q6h, Elder Acosta MD, 125 mg at 02/01/25 0633    vitamin B complex-vitamin C-folic acid (Nephrocaps) capsule 1 capsule, 1 capsule, oral, Daily, Edward Herman MD, 1 capsule at 01/31/25 0987

## 2025-02-01 NOTE — PROGRESS NOTES
Manolo Bashir is a 69 y.o. male on day 6 of admission presenting with Shortness of breath.      Subjective   NAEO  In dialysis. No complaints.    Objective   Physical Exam  Constitutional: Patient does not appear to be in any acute distress, Aox4  Cardio: RRR, S1/S2, no MRG  Pulm: CTAB no wheezes, normal respiratory effort  GI: +BS, soft, tender over LLQ (transplanted kidney) nondistended, no guarding or rebound, no masses noted  MSK: No joint swelling, multiple aneurysms and AV fistula noted on LUE  Skin: No lesions, contusions, or erythema.  Extremities: no BLE swelling   Neuro: No focal deficits  Psych: Appropriate mood and behavior    Last Recorded Vitals  /68   Pulse 73   Temp 36.2 °C (97.2 °F) (Temporal)   Resp 16   Wt 72.6 kg (160 lb)   SpO2 96%   Intake/Output last 3 Shifts:    Intake/Output Summary (Last 24 hours) at 2/1/2025 0956  Last data filed at 2/1/2025 0830  Gross per 24 hour   Intake 200 ml   Output --   Net 200 ml       Admission Weight  Weight: 72.6 kg (160 lb) (01/26/25 0509)    Daily Weight  01/26/25 : 72.6 kg (160 lb)    Image Results  CT abdomen pelvis wo IV contrast  Narrative: Interpreted By:  Hue Aguilar and Ritchie Brandon   STUDY:  CT ABDOMEN PELVIS WO IV CONTRAST;  1/30/2025 5:09 am      INDICATION:  Signs/Symptoms:abdomen pain iso possible kidney rejection. Per chart  review, + C diff PCR on 01/27/2025. 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      COMPARISON:  CT abdomen and pelvis 01/05/2025.      ACCESSION NUMBER(S):  [id number]      ORDERING CLINICIAN:  JEFFREY CORTEZ      TECHNIQUE:  CT of the abdomen and pelvis was performed. Contiguous axial images  were obtained at 3 mm slice thickness through the abdomen and pelvis.  Coronal and sagittal reconstructions at 3 mm slice thickness were  performed.  No intravenous contrast was administered.      FINDINGS:  Please note that the evaluation of vessels, lymph nodes  and organs is  limited without intravenous contrast.      LOWER CHEST:  Mild interval increase in small right-sided pleural effusion. New  trace left-sided pleural effusion. There is mild interval increase in  scattered ground-glass opacities throughout the bilateral lung bases.  Cardiomegaly is noted. Patulous distal esophagus with retained  secretions, unchanged compared to 01/05/2025 CT.      ABDOMEN:      LIVER:  Liver is borderline enlarged measuring 18.5 cm in craniocaudal  dimension, similar to prior. No focal liver lesions on noncontrast  exam.      BILE DUCTS:  The intrahepatic and extrahepatic ducts are not dilated.      GALLBLADDER:  The gallbladder is surgically absent.      PANCREAS:  No significant abnormality.      SPLEEN:  Spleen measures 14 cm in craniocaudal dimension/borderline-mildly  enlarged previously measuring 13.5 cm on prior from 01/05/2025.      ADRENAL GLANDS:  Bilateral adrenal glands appear normal.      KIDNEYS AND URETERS:  Bilateral native kidneys are atrophic compatible with known history  of chronic kidney disease. No hydroureteronephrosis or  nephroureterolithiasis is identified. Left iliac fossa transplant  kidney which is similar compared to prior examination in regards of  the surrounding fat stranding/edematous appearance. Within  limitations of noncontrast examination, there is no evidence of  obvious organizing perinephric fluid collections.      PELVIS:      BLADDER:  The urinary bladder is decompressed, limited for evaluation.      REPRODUCTIVE ORGANS:  No significant abnormality.      BOWEL:  The stomach is unremarkable.  Postsurgical changes of prior small  bowel resection. The small bowel is overall normal in caliber without  evidence of marked wall thickening. Scattered diverticulosis of the  colon. There is no evidence of abnormal colonic wall thickening,  however there is fat stranding/hyperemia surrounding the distal  descending colon/sigmoid that is adjacent to the  left iliac fossa  transplant kidney (series 201 image 75/series 202, image 26). The  appendix is surgically absent.      VESSELS:  There is no aneurysmal dilatation of the abdominal aorta. There is  marked calcified disease of the bilateral internal iliac arteries, as  well as several arteries throughout the abdomen and pelvis. The IVC  appears normal.      PERITONEUM/RETROPERITONEUM/LYMPH NODES:  Mesenteric stranding/edema surrounding the left iliac fossa  transplant kidney. There is trace free pelvic fluid      ABDOMINAL WALL:  The abdominal wall soft tissues appear normal.      BONES:  No suspicious osseous lesions are identified. Sclerotic focus in the  S2 vertebral body, likely a bone island. There are subchondral cystic  changes involving the bilateral SI joints.      Impression: 1.  Edematous appearance of the left iliac fossa transplant kidney  with surrounding mesenteric fat, not significantly changed compared  to 01/05/2025 CT. Additionally, urinary bladder is decompressed,  limiting evaluation, however there is unchanged marked perivesicular  fat stranding scratch. Findings concerning for urinary tract  infection/pyelonephritis for correlation with urinalysis.  2. Interval decrease in ground-glass opacities involving the  bilateral lung bases. In the setting of cardiomegaly, increasing  bilateral pleural effusions and interstitial septal thickening these  findings are favored to represent pulmonary edema, however  superimposed infection can not be excluded.  3. Additional stable chronic as detailed above, including mild  splenomegaly.      I personally reviewed the images/study and I agree with the findings  as stated by resident Jean Rowland. This study was interpreted  at [facility], [place], [place].      MACRO:  None      Signed by: Hue Aguilar 1/30/2025 9:44 AM  Dictation workstation:   [id number]      Relevant Results  Scheduled medications  brimonidine, 1  drop, Right Eye, BID  carvedilol, 37.5 mg, oral, BID  cinacalcet, 30 mg, oral, Daily  epoetin aida or biosimilar, 8,000 Units, intravenous, Once per day on Tuesday Thursday Saturday  heparin (porcine), 5,000 Units, subcutaneous, q8h ZOË  imiquimod, 1 packet, Topical, Once per day on Monday Wednesday Friday  insulin glargine, 15 Units, subcutaneous, q AM  insulin lispro, 0-10 Units, subcutaneous, TID AC  insulin NPH (Isophane), 10 Units, subcutaneous, q24h [facility]  isosorbide mononitrate ER, 60 mg, oral, Daily  ketorolac, 1 drop, Right Eye, TID  neomycin-polymyxin-dexAMETHasone, 1 drop, Right Eye, q8h ZOË  NIFEdipine ER, 90 mg, oral, BID  pantoprazole, 40 mg, oral, Daily before breakfast  paricalcitol, 10 mcg, intravenous, Once per day on Tuesday Thursday Saturday  pravastatin, 10 mg, oral, Nightly  predniSONE, 20 mg, oral, Daily   Followed by  [START ON 2/3/2025] predniSONE, 10 mg, oral, Daily   Followed by  [START ON 2/5/2025] predniSONE, 5 mg, oral, Daily  sevelamer carbonate, 800 mg, oral, TID AC  sodium zirconium cyclosilicate, 10 g, oral, q8h  tacrolimus, 0.5 mg, oral, BID  vancomycin, 125 mg, oral, q6h  vitamin B complex-vitamin C-folic acid, 1 capsule, oral, Daily      Continuous medications     PRN medications  PRN medications: albuterol, benzonatate, dextrose, dextrose, glucagon, glucagon, HYDROmorphone, oxygen, polyethylene glycol       Results for orders placed or performed during the hospital encounter of 01/26/25 (from the past 24 hours)   POCT GLUCOSE   Result Value Ref Range    POCT Glucose 300 (H) 74 - 99 mg/dL   Renal Function Panel   Result Value Ref Range    Glucose 239 (H) 74 - 99 mg/dL    Sodium 133 (L) 136 - 145 mmol/L    Potassium 4.8 3.5 - 5.3 mmol/L    Chloride 93 (L) 98 - 107 mmol/L    Bicarbonate 31 21 - 32 mmol/L    Anion Gap 14 10 - 20 mmol/L    Urea Nitrogen 30 (H) 6 - 23 mg/dL    Creatinine 9.71 (H) 0.50 - 1.30 mg/dL    eGFR 5 (L) >60 mL/min/1.73m*2    Calcium 9.6 8.6 - 10.6 mg/dL     Phosphorus 3.9 2.5 - 4.9 mg/dL    Albumin 3.3 (L) 3.4 - 5.0 g/dL   Magnesium   Result Value Ref Range    Magnesium 1.93 1.60 - 2.40 mg/dL   POCT GLUCOSE   Result Value Ref Range    POCT Glucose 217 (H) 74 - 99 mg/dL   POCT GLUCOSE   Result Value Ref Range    POCT Glucose 257 (H) 74 - 99 mg/dL   Magnesium   Result Value Ref Range    Magnesium 2.07 1.60 - 2.40 mg/dL   Renal Function Panel   Result Value Ref Range    Glucose 349 (H) 74 - 99 mg/dL    Sodium 129 (L) 136 - 145 mmol/L    Potassium 4.5 3.5 - 5.3 mmol/L    Chloride 91 (L) 98 - 107 mmol/L    Bicarbonate 27 21 - 32 mmol/L    Anion Gap 16 10 - 20 mmol/L    Urea Nitrogen 40 (H) 6 - 23 mg/dL    Creatinine 11.01 (H) 0.50 - 1.30 mg/dL    eGFR 5 (L) >60 mL/min/1.73m*2    Calcium 9.3 8.6 - 10.6 mg/dL    Phosphorus 4.4 2.5 - 4.9 mg/dL    Albumin 3.1 (L) 3.4 - 5.0 g/dL   CBC and Auto Differential   Result Value Ref Range    WBC 3.2 (L) 4.4 - 11.3 x10*3/uL    nRBC 0.0 0.0 - 0.0 /100 WBCs    RBC 2.98 (L) 4.50 - 5.90 x10*6/uL    Hemoglobin 8.6 (L) 13.5 - 17.5 g/dL    Hematocrit 26.3 (L) 41.0 - 52.0 %    MCV 88 80 - 100 fL    MCH 28.9 26.0 - 34.0 pg    MCHC 32.7 32.0 - 36.0 g/dL    RDW 16.0 (H) 11.5 - 14.5 %    Platelets 160 150 - 450 x10*3/uL    Neutrophils % 78.2 40.0 - 80.0 %    Immature Granulocytes %, Automated 0.3 0.0 - 0.9 %    Lymphocytes % 11.7 13.0 - 44.0 %    Monocytes % 9.8 2.0 - 10.0 %    Eosinophils % 0.0 0.0 - 6.0 %    Basophils % 0.0 0.0 - 2.0 %    Neutrophils Absolute 2.48 1.20 - 7.70 x10*3/uL    Immature Granulocytes Absolute, Automated 0.01 0.00 - 0.70 x10*3/uL    Lymphocytes Absolute 0.37 (L) 1.20 - 4.80 x10*3/uL    Monocytes Absolute 0.31 0.10 - 1.00 x10*3/uL    Eosinophils Absolute 0.00 0.00 - 0.70 x10*3/uL    Basophils Absolute 0.00 0.00 - 0.10 x10*3/uL     *Note: Due to a large number of results and/or encounters for the requested time period, some results have not been displayed. A complete set of results can be found in Results Review.              Assessment & Plan  Shortness of breath    Manolo Bashir is a 69 y.o. male with a PMH of ESRD (on dialysis, TThSa schedule, most recent dialysis 1/25), c/b impaired allograft function currently on immunosuppression (prednisone, tacrolimus), IgG and IgA lambda MGUS (BM biopsy 10/23 without evidence of myeloma), T2DM (on 20 units Lantus in AM, Lispro 3 units TID + SSI), HTN, prostate cancer (s/p radical prostatectomy 10/2013), HCV (s/p treatment, PCR negative 10/2023), and gastroparesis who presents for 1 day history of SOB, nausea/vomiting, diarrhea, and general malaise that started during dialysis on 1/25. Presentation is concerning for fluid overload 2/2 ESRD, however cannot rule out cardiac etiologies of pulmonary edema. Also, given the patient's fever, history of immunocompromise, and elevated procalcitonin, there is concern for potential underlying pneumonia, however fever may also be elevated 2/2 chronic rejection. Currently being treated for C. Diff and pending potential nephrectomy next week.     Updates 2/1/25  - Per transplant surgery, tentative plan for nephrectomy next week, potentially mid-week pending improvement in clinical status  - Patient received dialysis Today,   - Hyperkalemia resolved  - BP better controlled on nifedipine to 90 mg BID     #ESRD on dialysis  #Hx of two failed kidney transplants c/b chronic rejection  #Chronic immunosuppression  #Elevated BNP  - Patient febrile on admission to 38.4  - Hx of ESRD 2/2 HTN s/p 2 allographic kidney transplants c/b chronic rejection, dialysis TThSa, has not missed dialysis sessions  - Most recent TTE 4/4/24, EF 54%, dilated/hypertrophic left ventricle, dilated LA/RA, mild to moderate aortic regurgitation  - QTc on admission 450 ms  -Procal elevated to 0.95  - Troponin trend 65 -> 87 -> 86  -MRSA nares negative 1/26  -TTE 1/28 without significant change from prior  -RCRI of 4 (intraperitoneal procedure, insulin-dependent T2DM, HFpEF, Cr >  2)  PLAN  - Transplant nephrology consulted, appreciate recommendations                - Continue normal dialysis schedule TThSa                - Prednisone taper started 1/30 (40 mg x 2 days, 20 mg x 2 days, 10 mg x 2 days, 5 mg x 2 days)  - Continue tacrolimus 0.5 mg BID  - Transplant surgery consulted for potential nephrectomy, appreciate recommendations                - Tentative plan for next week pending clinical improvement in C. Diff and potential pna  - Per nephrology:                - Continue DARIANA                - Continue calcitriol, on parenteral zemplar OP                - Continue sevelamer 800 mg TID  - CTM daily RFP, CBC  - Strict I/Os  - Daily standing weights as tolerated     #Hyperkalemia--Resolved  - Daily RFP     #AHRF 2/2 pulmonary edema vs. Pneumonia, improved  - Patient hypoxic to 88% on RA on admission, required 3L supplemental O2 briefly in ED, was weaned off  - CXR on admission with evidence of pulmonary vascular congestion, pulmonary edema and pleural effusions, cannot rule out potential infiltrate  - COVID/Flu/RSV negative, other viral URI studies negative so far  Plan  - Tessalon Purls, guaifenesin, and albuterol nebs PRN for cough  - ID consulted, appreciate recommendations                - Not concerned for bacterial pneumonia so antibiotics discontinued  - Supplemental O2 PRN, goal SpO2 > 92%     #C. diff infection, recurrent  #Gastroparesis  #Nausea and vomiting, improved  #Diarrhea, improving  - C. Diff PCR +, toxin negative, however given risk factors of immunocompromise, recent abx, and frequent hospital/health care setting visits, will treat  - Hx of C. Diff in 2024, treated with PO vancomycin  - nausea/vomiting may be partially 2/2 chronic transplant rejection  PLAN  - Continue PO vancomycin 125 mg q6h x 10 days (through 2/6)  - Transplant ID consulted, appreciate recommendations                - CMV PCR ordered  - Stool O/P ordered, pending  - Giardia, cryptosporidium antigen  pending  - Consider restarting metoclopramide if nausea/vomiting recur  - CTM stool output and abdominal exams  - Orthostatic vitals ordered     #IgG and IgA lambda MGUS  #Thrombocytopenia  - Thrombocytopenia chronic, however baseline ~90s, may be decreased in the setting of viral infection vs. Immunosuppression vs. MGUS  - 4T score 3, did receive heparin on prior admission 1/5-1/8 but low suspicion for HIT  PLAN  - CTM on daily CBC  - Continue home cinacalcet 30 mg daily     #Hx of recent cataract surgery  - Continue home eye drops:                - Maxitrol 1 drop R eye q8h                - brimonidine 2% solution 1 drop R eye BID                - ketorolac 0.5% solution 1 drop R eye q8h     #HTN  - Increase nifedipine to 90 mg BID  - Continue home carvedilol 25 mg BID  - Continue home Imdur 60 mg daily  - May add PRN hydralazine 10 mg PO for SBP >180     #T2DM  - Most recent A1c 9.1 12/16/24  PLAN  - Continue Lantus 15 units in AM + #2 SSI  - Add 10 units NPH while on steroid taper, to be given with prednisone  - Consider initiation of glucommander while inpatient     #HLD  - Continue home pravastatin 10 mg daily     #GERD  - Continue home pantoprazole 40 mg daily     F: Replete PRN  E: Replete PRN  N: Adult diet Phosphorus restricted 1 gm, Renal, Consistent Carb; CCD 75 gm/meal; Potassium Restricted 2 gm (50mEq); 2 - 3 grams Sodium   A: PIV / Cedeño (date placed)     Oxygen: None  Abx: neomycin-polymyxin-dexAMETHasone - 3.5mg/mL-10,000 unit/mL-0.1 %, 3.5mg/mL-10,000 unit/mL-0.1 %  sulfamethoxazole-trimethoprim - 800-160 mg  vancomycin - 50 mg/mL      DVT Ppx: Heparin SubQ  GI Ppx: Pantoprazole  GI Laxative: Miralax     Code Status: full (confirmed on admission)  Surrogate Medical Decision-maker:  Amalia Enriquez ([phone])                  Jayde Jeter MD

## 2025-02-01 NOTE — CARE PLAN
The patient's goals for the shift include      The clinical goals for the shift include SBP will remain <180 this shift      Problem: Diabetes  Goal: Maintain glucose levels >70mg/dl to <250mg/dl throughout shift  Outcome: Progressing  Goal: No changes in neurological exam by end of shift  Outcome: Progressing  Goal: Vital signs within normal range for age by end of shift  Outcome: Progressing     Problem: Pain - Adult  Goal: Verbalizes/displays adequate comfort level or baseline comfort level  Outcome: Progressing     Problem: Safety - Adult  Goal: Free from fall injury  Outcome: Progressing     Problem: Discharge Planning  Goal: Discharge to home or other facility with appropriate resources  Outcome: Progressing     Problem: Chronic Conditions and Co-morbidities  Goal: Patient's chronic conditions and co-morbidity symptoms are monitored and maintained or improved  Outcome: Progressing     Problem: Nutrition  Goal: Nutrient intake appropriate for maintaining nutritional needs  Outcome: Progressing

## 2025-02-01 NOTE — CARE PLAN
Problem: Diabetes  Goal: Achieve decreasing blood glucose levels by end of shift  Outcome: Progressing  Goal: Increase stability of blood glucose readings by end of shift  Outcome: Progressing  Goal: Decrease in ketones present in urine by end of shift  Outcome: Progressing  Goal: Maintain electrolyte levels within acceptable range throughout shift  Outcome: Progressing  Goal: Vital signs within normal range for age by end of shift  Outcome: Progressing     Problem: Pain - Adult  Goal: Verbalizes/displays adequate comfort level or baseline comfort level  Outcome: Progressing     Problem: Safety - Adult  Goal: Free from fall injury  Outcome: Progressing     Problem: Discharge Planning  Goal: Discharge to home or other facility with appropriate resources  Outcome: Progressing     Problem: Chronic Conditions and Co-morbidities  Goal: Patient's chronic conditions and co-morbidity symptoms are monitored and maintained or improved  Outcome: Progressing   The patient's goals for the shift include      The clinical goals for the shift include pts bp will remain wdl through shift

## 2025-02-01 NOTE — PROGRESS NOTES
Manolo Bashir is a 69 y.o. male on day 6 of admission presenting with Shortness of breath.    Subjective   Interval History: Patient notes that the diarrhea has resolved. As has his cramping and stomach pain. Cough is also resolving.         Review of Systems    Review of systems otherwise negative    Objective   Range of Vitals (last 24 hours)  Heart Rate:  [67-74]   Temp:  [36.2 °C (97.2 °F)-37.1 °C (98.8 °F)]   Resp:  [16-18]   BP: (134-204)/(62-82)   SpO2:  [96 %-98 %]   Daily Weight  01/26/25 : 72.6 kg (160 lb)    Body mass index is 24.33 kg/m².    Physical Exam  General: in no acute distress, able to answer questions  HEENT: no conjunctival injection. anicteric.  Abd: Soft and lax. ND.   Neuro: Answers questions appropriately.  Integumentary: no obvious lesions       Antibiotics  neomycin-polymyxin-dexAMETHasone - 3.5mg/mL-10,000 unit/mL-0.1 %, 3.5mg/mL-10,000 unit/mL-0.1 %  sulfamethoxazole-trimethoprim - 800-160 mg  vancomycin - 50 mg/mL    Relevant Results  Labs  Results from last 72 hours   Lab Units 02/01/25  0559 01/31/25  0552 01/30/25  0634   WBC AUTO x10*3/uL 3.2* 2.7* 4.1*   HEMOGLOBIN g/dL 8.6* 9.0* 8.8*   HEMATOCRIT % 26.3* 28.7* 27.2*   PLATELETS AUTO x10*3/uL 160 85* 82*   NEUTROS PCT AUTO % 78.2 84.5 70.4   LYMPHS PCT AUTO % 11.7 10.0 16.2   MONOS PCT AUTO % 9.8 4.8 10.0   EOS PCT AUTO % 0.0 0.0 2.7     Results from last 72 hours   Lab Units 02/01/25  0559 01/31/25  1345 01/31/25  0552   SODIUM mmol/L 129* 133* 132*   POTASSIUM mmol/L 4.5 4.8 5.8*   CHLORIDE mmol/L 91* 93* 95*   CO2 mmol/L 27 31 25   BUN mg/dL 40* 30* 27*   CREATININE mg/dL 11.01* 9.71* 8.91*   GLUCOSE mg/dL 349* 239* 375*   CALCIUM mg/dL 9.3 9.6 9.0   ANION GAP mmol/L 16 14 18   EGFR mL/min/1.73m*2 5* 5* 6*   PHOSPHORUS mg/dL 4.4 3.9 3.5     Results from last 72 hours   Lab Units 02/01/25  0559 01/31/25  1345 01/31/25  0552   ALBUMIN g/dL 3.1* 3.3* 3.2*     Estimated Creatinine Clearance: 6.2 mL/min (A) (by C-G formula based  on SCr of 11.01 mg/dL (H)).  C-Reactive Protein   Date Value Ref Range Status   02/01/2024 0.57 <1.00 mg/dL Final     CRP   Date Value Ref Range Status   10/20/2022 3.17 (A) mg/dL Final     Comment:     REF VALUE  < 1.00     10/15/2022 25.57 (A) mg/dL Final     Comment:     REF VALUE  < 1.00       Microbiology  No results found for the last 14 days.  CMV <35   1/30 BC NGTD  1/27 C. Diff negative EIA, positive PCR  1/27 pathogen panel negative    Imaging    CTAP  1.  Edematous appearance of the left iliac fossa transplant kidney  with surrounding mesenteric fat, not significantly changed compared  to 01/05/2025 CT. Additionally, urinary bladder is decompressed,  limiting evaluation, however there is unchanged marked perivesicular  fat stranding scratch. Findings concerning for urinary tract  infection/pyelonephritis for correlation with urinalysis.  2. Interval decrease in ground-glass opacities involving the  bilateral lung bases. In the setting of cardiomegaly, increasing  bilateral pleural effusions and interstitial septal thickening these  findings are favored to represent pulmonary edema, however  superimposed infection can not be excluded.  3. Additional stable chronic as detailed above, including mild  splenomegaly.          Assessment/Plan     69 y.o man with PMH ESRD s/p failed kidney transplant with ongoing rejected, on HD via AVG admitted with cough and diarrhea.      #Cough, resolving  #Diarrhea, resolved  #Positive C. Diff PCR, negative EIA  #ESRD failed kidney transplant on HD     Patient with cough and diarrhea. The diarrhea could be from c. Diff although EIA was negative. Patient notes that his cough is dry and not asssociated with significant sputum. He has improved since admission so this could be viral in nature or C. Diff related.  His cramping and diarrhea have resolved. Patient does have a previous episodes of possible C. Diff around 8/2024. Given that this was more than 2 months ago we would  not define this as a recurrence. Finally we did request a urine legionella that has not been obtained. Could be due to lack or urine production.     -Continue oral vancomycin 125 4 times daily for a total of 10 day course (end of treatment date 2/6/24)  -Patient is unfortunately not a candidate for bezlotoxumab given his CHF history  -No need for outpatient ID labs or follow up at this time    Infectious disease will sign off at this time. Please feel free to reach out with any questions or concerns. If any questions about this patient please haiku or call id pager [phone]       Arjun Simons  Infectious Diseases  Team C

## 2025-02-01 NOTE — NURSING NOTE
Report from Sending RN:    Report From: Salome  Recent Surgery of Procedure: No  Baseline Level of Consciousness (LOC): a and ox 4  Oxygen Use: No  Type: ra  Diabetic: Yes  Last BP Med Given Day of Dialysis: WNL with meds  Last Pain Med Given: None  Lab Tests to be Obtained with Dialysis: No  Blood Transfusion to be Given During Dialysis: No  Available IV Access: Yes  Medications to be Administered During Dialysis: No  Continuous IV Infusion Running: Yes  Restraints on Currently or in the Last 24 Hours: No  Hand-Off Communication: Can stand for  wt  Dialysis Catheter Dressing: AVG  Last Dressing Change: NA

## 2025-02-02 VITALS
BODY MASS INDEX: 24.25 KG/M2 | HEIGHT: 68 IN | WEIGHT: 160 LBS | RESPIRATION RATE: 18 BRPM | DIASTOLIC BLOOD PRESSURE: 67 MMHG | SYSTOLIC BLOOD PRESSURE: 145 MMHG | HEART RATE: 79 BPM | TEMPERATURE: 99.1 F | OXYGEN SATURATION: 96 %

## 2025-02-02 LAB
ALBUMIN SERPL BCP-MCNC: 3.2 G/DL (ref 3.4–5)
ANION GAP SERPL CALC-SCNC: 12 MMOL/L (ref 10–20)
BACTERIA BLD CULT: NORMAL
BACTERIA BLD CULT: NORMAL
BASOPHILS # BLD AUTO: 0 X10*3/UL (ref 0–0.1)
BASOPHILS NFR BLD AUTO: 0 %
BUN SERPL-MCNC: 23 MG/DL (ref 6–23)
CALCIUM SERPL-MCNC: 9.2 MG/DL (ref 8.6–10.6)
CHLORIDE SERPL-SCNC: 96 MMOL/L (ref 98–107)
CO2 SERPL-SCNC: 31 MMOL/L (ref 21–32)
CREAT SERPL-MCNC: 6.85 MG/DL (ref 0.5–1.3)
EGFRCR SERPLBLD CKD-EPI 2021: 8 ML/MIN/1.73M*2
EOSINOPHIL # BLD AUTO: 0.01 X10*3/UL (ref 0–0.7)
EOSINOPHIL NFR BLD AUTO: 0.2 %
ERYTHROCYTE [DISTWIDTH] IN BLOOD BY AUTOMATED COUNT: 16.4 % (ref 11.5–14.5)
GLUCOSE BLD MANUAL STRIP-MCNC: 254 MG/DL (ref 74–99)
GLUCOSE BLD MANUAL STRIP-MCNC: 295 MG/DL (ref 74–99)
GLUCOSE BLD MANUAL STRIP-MCNC: 306 MG/DL (ref 74–99)
GLUCOSE BLD MANUAL STRIP-MCNC: 97 MG/DL (ref 74–99)
GLUCOSE SERPL-MCNC: 310 MG/DL (ref 74–99)
HCT VFR BLD AUTO: 27.6 % (ref 41–52)
HGB BLD-MCNC: 8.8 G/DL (ref 13.5–17.5)
IMM GRANULOCYTES # BLD AUTO: 0.05 X10*3/UL (ref 0–0.7)
IMM GRANULOCYTES NFR BLD AUTO: 1.1 % (ref 0–0.9)
LYMPHOCYTES # BLD AUTO: 0.58 X10*3/UL (ref 1.2–4.8)
LYMPHOCYTES NFR BLD AUTO: 12.7 %
MAGNESIUM SERPL-MCNC: 2.07 MG/DL (ref 1.6–2.4)
MCH RBC QN AUTO: 28.5 PG (ref 26–34)
MCHC RBC AUTO-ENTMCNC: 31.9 G/DL (ref 32–36)
MCV RBC AUTO: 89 FL (ref 80–100)
MONOCYTES # BLD AUTO: 0.47 X10*3/UL (ref 0.1–1)
MONOCYTES NFR BLD AUTO: 10.3 %
NEUTROPHILS # BLD AUTO: 3.44 X10*3/UL (ref 1.2–7.7)
NEUTROPHILS NFR BLD AUTO: 75.7 %
NRBC BLD-RTO: 0 /100 WBCS (ref 0–0)
PHOSPHATE SERPL-MCNC: 3 MG/DL (ref 2.5–4.9)
PLATELET # BLD AUTO: 163 X10*3/UL (ref 150–450)
POTASSIUM SERPL-SCNC: 3.7 MMOL/L (ref 3.5–5.3)
RBC # BLD AUTO: 3.09 X10*6/UL (ref 4.5–5.9)
SODIUM SERPL-SCNC: 135 MMOL/L (ref 136–145)
WBC # BLD AUTO: 4.6 X10*3/UL (ref 4.4–11.3)

## 2025-02-02 PROCEDURE — 80069 RENAL FUNCTION PANEL: CPT

## 2025-02-02 PROCEDURE — 2500000004 HC RX 250 GENERAL PHARMACY W/ HCPCS (ALT 636 FOR OP/ED)

## 2025-02-02 PROCEDURE — 82947 ASSAY GLUCOSE BLOOD QUANT: CPT

## 2025-02-02 PROCEDURE — 2500000002 HC RX 250 W HCPCS SELF ADMINISTERED DRUGS (ALT 637 FOR MEDICARE OP, ALT 636 FOR OP/ED)

## 2025-02-02 PROCEDURE — 2500000001 HC RX 250 WO HCPCS SELF ADMINISTERED DRUGS (ALT 637 FOR MEDICARE OP)

## 2025-02-02 PROCEDURE — 85025 COMPLETE CBC W/AUTO DIFF WBC: CPT

## 2025-02-02 PROCEDURE — 2500000004 HC RX 250 GENERAL PHARMACY W/ HCPCS (ALT 636 FOR OP/ED): Performed by: INTERNAL MEDICINE

## 2025-02-02 PROCEDURE — 99233 SBSQ HOSP IP/OBS HIGH 50: CPT

## 2025-02-02 PROCEDURE — 36415 COLL VENOUS BLD VENIPUNCTURE: CPT

## 2025-02-02 PROCEDURE — 1100000001 HC PRIVATE ROOM DAILY

## 2025-02-02 PROCEDURE — 2500000005 HC RX 250 GENERAL PHARMACY W/O HCPCS

## 2025-02-02 PROCEDURE — 83735 ASSAY OF MAGNESIUM: CPT

## 2025-02-02 RX ORDER — INSULIN GLARGINE 100 [IU]/ML
15 INJECTION, SOLUTION SUBCUTANEOUS NIGHTLY
Status: DISCONTINUED | OUTPATIENT
Start: 2025-02-02 | End: 2025-02-03

## 2025-02-02 RX ADMIN — BRIMONIDINE TARTRATE 1 DROP: 2 SOLUTION/ DROPS OPHTHALMIC at 21:15

## 2025-02-02 RX ADMIN — VANCOMYCIN HYDROCHLORIDE 125 MG: KIT at 13:16

## 2025-02-02 RX ADMIN — NIFEDIPINE 90 MG: 90 TABLET, FILM COATED, EXTENDED RELEASE ORAL at 09:40

## 2025-02-02 RX ADMIN — SEVELAMER CARBONATE 800 MG: 800 TABLET, FILM COATED ORAL at 11:46

## 2025-02-02 RX ADMIN — SEVELAMER CARBONATE 800 MG: 800 TABLET, FILM COATED ORAL at 06:55

## 2025-02-02 RX ADMIN — RENO CAPS 1 CAPSULE: 100; 1.5; 1.7; 20; 10; 1; 150; 5; 6 CAPSULE ORAL at 09:39

## 2025-02-02 RX ADMIN — INSULIN GLARGINE 15 UNITS: 100 INJECTION, SOLUTION SUBCUTANEOUS at 21:20

## 2025-02-02 RX ADMIN — KETOROLAC TROMETHAMINE 1 DROP: 5 SOLUTION OPHTHALMIC at 03:07

## 2025-02-02 RX ADMIN — KETOROLAC TROMETHAMINE 1 DROP: 5 SOLUTION OPHTHALMIC at 21:15

## 2025-02-02 RX ADMIN — CARVEDILOL 37.5 MG: 12.5 TABLET, FILM COATED ORAL at 21:14

## 2025-02-02 RX ADMIN — PRAVASTATIN SODIUM 10 MG: 10 TABLET ORAL at 21:14

## 2025-02-02 RX ADMIN — INSULIN GLARGINE 15 UNITS: 100 INJECTION, SOLUTION SUBCUTANEOUS at 09:35

## 2025-02-02 RX ADMIN — BRIMONIDINE TARTRATE 1 DROP: 2 SOLUTION/ DROPS OPHTHALMIC at 09:44

## 2025-02-02 RX ADMIN — INSULIN HUMAN 10 UNITS: 100 INJECTION, SUSPENSION SUBCUTANEOUS at 09:38

## 2025-02-02 RX ADMIN — ISOSORBIDE MONONITRATE 60 MG: 30 TABLET, EXTENDED RELEASE ORAL at 09:39

## 2025-02-02 RX ADMIN — KETOROLAC TROMETHAMINE 1 DROP: 5 SOLUTION OPHTHALMIC at 11:47

## 2025-02-02 RX ADMIN — CARVEDILOL 37.5 MG: 12.5 TABLET, FILM COATED ORAL at 09:39

## 2025-02-02 RX ADMIN — NEOMYCIN SULFATE, POLYMYXIN B SULFATE AND DEXAMETHASONE 1 DROP: 3.5; 10000; 1 SUSPENSION OPHTHALMIC at 21:15

## 2025-02-02 RX ADMIN — NIFEDIPINE 90 MG: 90 TABLET, FILM COATED, EXTENDED RELEASE ORAL at 21:14

## 2025-02-02 RX ADMIN — PREDNISONE 20 MG: 20 TABLET ORAL at 09:39

## 2025-02-02 RX ADMIN — TACROLIMUS 0.5 MG: 0.5 CAPSULE ORAL at 09:40

## 2025-02-02 RX ADMIN — VANCOMYCIN HYDROCHLORIDE 125 MG: KIT at 06:56

## 2025-02-02 RX ADMIN — INSULIN LISPRO 8 UNITS: 100 INJECTION, SOLUTION INTRAVENOUS; SUBCUTANEOUS at 09:34

## 2025-02-02 RX ADMIN — VANCOMYCIN HYDROCHLORIDE 125 MG: KIT at 18:57

## 2025-02-02 RX ADMIN — SEVELAMER CARBONATE 800 MG: 800 TABLET, FILM COATED ORAL at 17:31

## 2025-02-02 RX ADMIN — VANCOMYCIN HYDROCHLORIDE 125 MG: KIT at 03:05

## 2025-02-02 RX ADMIN — HEPARIN SODIUM 5000 UNITS: 5000 INJECTION, SOLUTION INTRAVENOUS; SUBCUTANEOUS at 14:58

## 2025-02-02 RX ADMIN — NEOMYCIN SULFATE, POLYMYXIN B SULFATE AND DEXAMETHASONE 1 DROP: 3.5; 10000; 1 SUSPENSION OPHTHALMIC at 03:07

## 2025-02-02 RX ADMIN — INSULIN LISPRO 8 UNITS: 100 INJECTION, SOLUTION INTRAVENOUS; SUBCUTANEOUS at 13:15

## 2025-02-02 RX ADMIN — PANTOPRAZOLE SODIUM 40 MG: 40 TABLET, DELAYED RELEASE ORAL at 06:56

## 2025-02-02 RX ADMIN — CINACALCET HYDROCHLORIDE 30 MG: 30 TABLET, FILM COATED ORAL at 09:40

## 2025-02-02 RX ADMIN — HEPARIN SODIUM 5000 UNITS: 5000 INJECTION, SOLUTION INTRAVENOUS; SUBCUTANEOUS at 21:21

## 2025-02-02 RX ADMIN — TACROLIMUS 0.5 MG: 0.5 CAPSULE ORAL at 21:14

## 2025-02-02 RX ADMIN — NEOMYCIN SULFATE, POLYMYXIN B SULFATE AND DEXAMETHASONE 1 DROP: 3.5; 10000; 1 SUSPENSION OPHTHALMIC at 11:47

## 2025-02-02 ASSESSMENT — PAIN SCALES - GENERAL: PAINLEVEL_OUTOF10: 0 - NO PAIN

## 2025-02-02 ASSESSMENT — PAIN - FUNCTIONAL ASSESSMENT: PAIN_FUNCTIONAL_ASSESSMENT: 0-10

## 2025-02-02 NOTE — CARE PLAN
The patient's goals for the shift include pt. will have adequate IV access.    The clinical goals for the shift include pt.will be able able to have glucose level less than 300 by this evening.    Over the shift, the patient did not make progress toward the following goals. Barriers to progression include IV placement. Recommendations to address these barriers include pt. Will be able to have decrease stools.

## 2025-02-02 NOTE — PROGRESS NOTES
Subjective   No events overnight. Refused heparin shot for DVT ppx this morning.  Reports diarrhea is improving. Denies any fever, chills, abdominal pain, nausea or vomiting.  Had 2.4 L removed via dialysis yesterday.     Meds  Scheduled medications  brimonidine, 1 drop, Right Eye, BID  carvedilol, 37.5 mg, oral, BID  cinacalcet, 30 mg, oral, Daily  epoetin aida or biosimilar, 8,000 Units, intravenous, Once per day on Tuesday Thursday Saturday  heparin (porcine), 5,000 Units, subcutaneous, q8h [facility]  imiquimod, 1 packet, Topical, Once per day on Monday Wednesday Friday  insulin glargine, 15 Units, subcutaneous, q AM  insulin lispro, 0-10 Units, subcutaneous, TID AC  insulin NPH (Isophane), 10 Units, subcutaneous, q24h [facility]  isosorbide mononitrate ER, 60 mg, oral, Daily  ketorolac, 1 drop, Right Eye, TID  neomycin-polymyxin-dexAMETHasone, 1 drop, Right Eye, q8h [facility]  NIFEdipine ER, 90 mg, oral, BID  pantoprazole, 40 mg, oral, Daily before breakfast  paricalcitol, 10 mcg, intravenous, Once per day on Tuesday Thursday Saturday  pravastatin, 10 mg, oral, Nightly  [START ON 2/3/2025] predniSONE, 10 mg, oral, Daily   Followed by  [START ON 2/5/2025] predniSONE, 5 mg, oral, Daily  sevelamer carbonate, 800 mg, oral, TID AC  tacrolimus, 0.5 mg, oral, BID  vancomycin, 125 mg, oral, q6h  vitamin B complex-vitamin C-folic acid, 1 capsule, oral, Daily      Continuous medications     PRN medications  PRN medications: albuterol, benzonatate, dextrose, dextrose, glucagon, glucagon, HYDROmorphone, oxygen, polyethylene glycol      Objective   Vital signs in last 24 hours:  Temp:  [36 °C (96.8 °F)-38.3 °C (100.9 °F)] 36.7 °C (98.1 °F)  Heart Rate:  [70-86] 70  Resp:  [16-18] 18  BP: (142-165)/(67-80) 142/67    Intake/Output this shift:    Intake/Output Summary (Last 24 hours) at 2/2/2025 1139  Last data filed at 2/1/2025 1209  Gross per 24 hour   Intake 600 ml   Output 2400 ml   Net -1800 ml       Physical  General: Patient is awake,  non-toxic appearing, normal body habitus  Pulm: Normal WOB at rest, no crackles or rhonchi  Cardiac: Regular rate and rhythm, normal S1/S2  Abdomen: Non-tender to palpation, non-distended  Extremities: No peripheral edema, or cyanosis  Neuro: Patient alert and oriented, cranial nerves grossly intact, normal strength and sensation.    Results  Results for orders placed or performed during the hospital encounter of 01/26/25 (from the past 24 hours)   POCT GLUCOSE   Result Value Ref Range    POCT Glucose 160 (H) 74 - 99 mg/dL   POCT GLUCOSE   Result Value Ref Range    POCT Glucose 260 (H) 74 - 99 mg/dL   POCT GLUCOSE   Result Value Ref Range    POCT Glucose 204 (H) 74 - 99 mg/dL   Magnesium   Result Value Ref Range    Magnesium 2.07 1.60 - 2.40 mg/dL   Renal Function Panel   Result Value Ref Range    Glucose 310 (H) 74 - 99 mg/dL    Sodium 135 (L) 136 - 145 mmol/L    Potassium 3.7 3.5 - 5.3 mmol/L    Chloride 96 (L) 98 - 107 mmol/L    Bicarbonate 31 21 - 32 mmol/L    Anion Gap 12 10 - 20 mmol/L    Urea Nitrogen 23 6 - 23 mg/dL    Creatinine 6.85 (H) 0.50 - 1.30 mg/dL    eGFR 8 (L) >60 mL/min/1.73m*2    Calcium 9.2 8.6 - 10.6 mg/dL    Phosphorus 3.0 2.5 - 4.9 mg/dL    Albumin 3.2 (L) 3.4 - 5.0 g/dL   CBC and Auto Differential   Result Value Ref Range    WBC 4.6 4.4 - 11.3 x10*3/uL    nRBC 0.0 0.0 - 0.0 /100 WBCs    RBC 3.09 (L) 4.50 - 5.90 x10*6/uL    Hemoglobin 8.8 (L) 13.5 - 17.5 g/dL    Hematocrit 27.6 (L) 41.0 - 52.0 %    MCV 89 80 - 100 fL    MCH 28.5 26.0 - 34.0 pg    MCHC 31.9 (L) 32.0 - 36.0 g/dL    RDW 16.4 (H) 11.5 - 14.5 %    Platelets 163 150 - 450 x10*3/uL    Neutrophils % 75.7 40.0 - 80.0 %    Immature Granulocytes %, Automated 1.1 (H) 0.0 - 0.9 %    Lymphocytes % 12.7 13.0 - 44.0 %    Monocytes % 10.3 2.0 - 10.0 %    Eosinophils % 0.2 0.0 - 6.0 %    Basophils % 0.0 0.0 - 2.0 %    Neutrophils Absolute 3.44 1.20 - 7.70 x10*3/uL    Immature Granulocytes Absolute, Automated 0.05 0.00 - 0.70 x10*3/uL     Lymphocytes Absolute 0.58 (L) 1.20 - 4.80 x10*3/uL    Monocytes Absolute 0.47 0.10 - 1.00 x10*3/uL    Eosinophils Absolute 0.01 0.00 - 0.70 x10*3/uL    Basophils Absolute 0.00 0.00 - 0.10 x10*3/uL   POCT GLUCOSE   Result Value Ref Range    POCT Glucose 295 (H) 74 - 99 mg/dL     *Note: Due to a large number of results and/or encounters for the requested time period, some results have not been displayed. A complete set of results can be found in Results Review.       Imaging  No results found.       Assessment/Plan   Assessment & Plan  Shortness of breath    Manolo Bashir is a 69 y.o. male with a PMH of ESRD (on dialysis, TThSa schedule, most recent dialysis 1/25), c/b impaired allograft function currently on immunosuppression (prednisone, tacrolimus), IgG and IgA lambda MGUS (BM biopsy 10/23 without evidence of myeloma), T2DM (on 20 units Lantus in AM, Lispro 3 units TID + SSI), HTN, prostate cancer (s/p radical prostatectomy 10/2013), HCV (s/p treatment, PCR negative 10/2023), and gastroparesis currently admitted for rejection of transplant surgery.    Now pending date by transplant surgery for nephrectomy     Updates 2/2/25  -No dialysis today. 2.4 L removed on 2/1, improvement in creatinine to 6.85 from 11.01  -Continue oral vancomycin for diarrhea, mild febrile episode overnight that self-resolved without medication. Course finishing on 2/6.       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  #Chronic immunosuppression  #Elevated BNP  - Patient febrile on admission to 38.4  - Hx of ESRD 2/2 HTN s/p 2 allographic kidney transplants c/b chronic rejection, dialysis TThSa, has not missed dialysis sessions  - Most recent TTE 4/4/24, EF 54%, dilated/hypertrophic left ventricle, dilated LA/RA, mild to moderate aortic regurgitation  - QTc on admission 450 ms  -Procal elevated to 0.95  - Troponin trend 65 -> 87 -> 86  -MRSA nares negative 1/26  -TTE 1/28  without significant change from prior  -RCRI of 4 (intraperitoneal procedure, insulin-dependent T2DM, HFpEF, Cr > 2)  PLAN  - Transplant nephrology consulted, appreciate recommendations                - Continue normal dialysis schedule TThSa                - Prednisone taper started 1/30 (40 mg x 2 days, 20 mg x 2 days, 10 mg x 2 days, 5 mg x 2 days)  - Continue tacrolimus 0.5 mg BID  - Transplant surgery consulted for potential nephrectomy, appreciate recommendations                - Tentative plan for next week pending clinical improvement in C. Diff and potential pna  - Per nephrology:                - Continue DARIANA                - Continue calcitriol, on parenteral zemplar OP                - Continue sevelamer 800 mg TID  - CTM daily RFP, CBC  - Strict I/Os  - Daily standing weights as tolerated          #C. diff infection, recurrent  #Gastroparesis  #Nausea and vomiting,resolved  #Diarrhea, improving  - C. Diff PCR +, toxin negative, however given risk factors of immunocompromise, recent abx, and frequent hospital/health care setting visits, will treat  - Hx of C. Diff in 2024, treated with PO vancomycin  - nausea/vomiting may be partially 2/2 chronic transplant rejection  -Giardia, crypto negative  -CMV viral load low  - Stool O/P ordered, pending  PLAN  - Continue PO vancomycin 125 mg q6h x 10 days (through 2/6)  - CTM stool output and abdominal exams  - Orthostatic vitals ordered       #AHRF 2/2 pulmonary edema, resolved  - Patient hypoxic to 88% on RA on admission, required 3L supplemental O2 briefly in ED, was weaned off  - CXR on admission with evidence of pulmonary vascular congestion, pulmonary edema and pleural effusions, cannot rule out potential infiltrate  - COVID/Flu/RSV negative, other viral URI studies negative so far  -Currently on room air since 1/27    Plan  - Tessalon Perles, guaifenesin, and albuterol nebs PRN for cough. Has not needed since 1/31      #IgG and IgA lambda  MGUS  #Thrombocytopenia  - Thrombocytopenia chronic, however baseline ~90s, may be decreased in the setting of viral infection vs. Immunosuppression vs. MGUS  - 4T score 3, did receive heparin on prior admission 1/5-1/8 but low suspicion for HIT  PLAN  - CTM on daily CBC  - Continue home cinacalcet 30 mg daily     #Hx of recent cataract surgery  - Continue home eye drops:                - Maxitrol 1 drop R eye q8h                - brimonidine 2% solution 1 drop R eye BID                - ketorolac 0.5% solution 1 drop R eye q8h     #HTN  - Continue nifedepine 90 mg BID  - Continue home carvedilol 25 mg BID  - Continue home Imdur 60 mg daily     #T2DM  - Most recent A1c 9.1 12/16/24  PLAN  - Continue Lantus 15 units in AM + #2 SSI  - Add 10 units NPH while on steroid taper, to be given with prednisone  - Consider initiation of glucommander while inpatient     #HLD  - Continue home pravastatin 10 mg daily     #GERD  - Continue home pantoprazole 40 mg daily    F: Replete PRN  E: Replete PRN  N: Adult diet Phosphorus restricted 1 gm, Renal, Consistent Carb; CCD 75 gm/meal; Potassium Restricted 2 gm (50mEq); 2 - 3 grams Sodium   A: PIV / Cedeño (date placed)     Oxygen: None  Abx:   sulfamethoxazole-trimethoprim - 800-160 mg  vancomycin - 50 mg/mL      DVT Ppx: Heparin SubQ  GI Ppx: Pantoprazole  GI Laxative: Miralax     Code Status: full (confirmed on admission)  Surrogate Medical Decision-maker:  Amalia Enriquez ([phone])          LOS: 7 days     Yariel Aiken MD/PhD   PGY-2

## 2025-02-03 ENCOUNTER — APPOINTMENT (OUTPATIENT)
Dept: CARDIOLOGY | Facility: HOSPITAL | Age: 70
DRG: 640 | End: 2025-02-03
Payer: MEDICARE

## 2025-02-03 LAB
ALBUMIN SERPL BCP-MCNC: 3.2 G/DL (ref 3.4–5)
ANION GAP SERPL CALC-SCNC: 13 MMOL/L (ref 10–20)
BACTERIA BLD CULT: NORMAL
BACTERIA BLD CULT: NORMAL
BASOPHILS # BLD AUTO: 0.02 X10*3/UL (ref 0–0.1)
BASOPHILS NFR BLD AUTO: 0.5 %
BUN SERPL-MCNC: 37 MG/DL (ref 6–23)
CALCIUM SERPL-MCNC: 9.5 MG/DL (ref 8.6–10.6)
CHLORIDE SERPL-SCNC: 96 MMOL/L (ref 98–107)
CO2 SERPL-SCNC: 30 MMOL/L (ref 21–32)
CREAT SERPL-MCNC: 9.66 MG/DL (ref 0.5–1.3)
EGFRCR SERPLBLD CKD-EPI 2021: 5 ML/MIN/1.73M*2
EOSINOPHIL # BLD AUTO: 0.02 X10*3/UL (ref 0–0.7)
EOSINOPHIL NFR BLD AUTO: 0.5 %
ERYTHROCYTE [DISTWIDTH] IN BLOOD BY AUTOMATED COUNT: 16.1 % (ref 11.5–14.5)
GLUCOSE BLD MANUAL STRIP-MCNC: 105 MG/DL (ref 74–99)
GLUCOSE BLD MANUAL STRIP-MCNC: 159 MG/DL (ref 74–99)
GLUCOSE BLD MANUAL STRIP-MCNC: 187 MG/DL (ref 74–99)
GLUCOSE BLD MANUAL STRIP-MCNC: 209 MG/DL (ref 74–99)
GLUCOSE BLD MANUAL STRIP-MCNC: 395 MG/DL (ref 74–99)
GLUCOSE SERPL-MCNC: 202 MG/DL (ref 74–99)
HCT VFR BLD AUTO: 30.3 % (ref 41–52)
HGB BLD-MCNC: 9.6 G/DL (ref 13.5–17.5)
IMM GRANULOCYTES # BLD AUTO: 0.07 X10*3/UL (ref 0–0.7)
IMM GRANULOCYTES NFR BLD AUTO: 1.9 % (ref 0–0.9)
LYMPHOCYTES # BLD AUTO: 0.77 X10*3/UL (ref 1.2–4.8)
LYMPHOCYTES NFR BLD AUTO: 20.6 %
MAGNESIUM SERPL-MCNC: 2.07 MG/DL (ref 1.6–2.4)
MCH RBC QN AUTO: 28.3 PG (ref 26–34)
MCHC RBC AUTO-ENTMCNC: 31.7 G/DL (ref 32–36)
MCV RBC AUTO: 89 FL (ref 80–100)
MONOCYTES # BLD AUTO: 0.43 X10*3/UL (ref 0.1–1)
MONOCYTES NFR BLD AUTO: 11.5 %
NEUTROPHILS # BLD AUTO: 2.42 X10*3/UL (ref 1.2–7.7)
NEUTROPHILS NFR BLD AUTO: 65 %
NRBC BLD-RTO: 0 /100 WBCS (ref 0–0)
PHOSPHATE SERPL-MCNC: 2.8 MG/DL (ref 2.5–4.9)
PLATELET # BLD AUTO: 219 X10*3/UL (ref 150–450)
POTASSIUM SERPL-SCNC: 3.7 MMOL/L (ref 3.5–5.3)
RBC # BLD AUTO: 3.39 X10*6/UL (ref 4.5–5.9)
SODIUM SERPL-SCNC: 135 MMOL/L (ref 136–145)
WBC # BLD AUTO: 3.7 X10*3/UL (ref 4.4–11.3)

## 2025-02-03 PROCEDURE — 2500000004 HC RX 250 GENERAL PHARMACY W/ HCPCS (ALT 636 FOR OP/ED): Performed by: INTERNAL MEDICINE

## 2025-02-03 PROCEDURE — 2500000004 HC RX 250 GENERAL PHARMACY W/ HCPCS (ALT 636 FOR OP/ED)

## 2025-02-03 PROCEDURE — 93005 ELECTROCARDIOGRAM TRACING: CPT

## 2025-02-03 PROCEDURE — 36415 COLL VENOUS BLD VENIPUNCTURE: CPT

## 2025-02-03 PROCEDURE — 82947 ASSAY GLUCOSE BLOOD QUANT: CPT

## 2025-02-03 PROCEDURE — 99233 SBSQ HOSP IP/OBS HIGH 50: CPT | Performed by: STUDENT IN AN ORGANIZED HEALTH CARE EDUCATION/TRAINING PROGRAM

## 2025-02-03 PROCEDURE — 2500000002 HC RX 250 W HCPCS SELF ADMINISTERED DRUGS (ALT 637 FOR MEDICARE OP, ALT 636 FOR OP/ED)

## 2025-02-03 PROCEDURE — 83735 ASSAY OF MAGNESIUM: CPT

## 2025-02-03 PROCEDURE — 2500000001 HC RX 250 WO HCPCS SELF ADMINISTERED DRUGS (ALT 637 FOR MEDICARE OP)

## 2025-02-03 PROCEDURE — 99232 SBSQ HOSP IP/OBS MODERATE 35: CPT | Performed by: NURSE PRACTITIONER

## 2025-02-03 PROCEDURE — 80069 RENAL FUNCTION PANEL: CPT

## 2025-02-03 PROCEDURE — 1100000001 HC PRIVATE ROOM DAILY

## 2025-02-03 PROCEDURE — 85025 COMPLETE CBC W/AUTO DIFF WBC: CPT

## 2025-02-03 PROCEDURE — 2500000005 HC RX 250 GENERAL PHARMACY W/O HCPCS

## 2025-02-03 RX ORDER — INSULIN GLARGINE 100 [IU]/ML
18 INJECTION, SOLUTION SUBCUTANEOUS EVERY MORNING
Status: DISCONTINUED | OUTPATIENT
Start: 2025-02-04 | End: 2025-02-05 | Stop reason: HOSPADM

## 2025-02-03 RX ORDER — INSULIN GLARGINE 100 [IU]/ML
20 INJECTION, SOLUTION SUBCUTANEOUS EVERY MORNING
Status: DISCONTINUED | OUTPATIENT
Start: 2025-02-04 | End: 2025-02-03

## 2025-02-03 RX ADMIN — KETOROLAC TROMETHAMINE 1 DROP: 5 SOLUTION OPHTHALMIC at 20:36

## 2025-02-03 RX ADMIN — NEOMYCIN SULFATE, POLYMYXIN B SULFATE AND DEXAMETHASONE 1 DROP: 3.5; 10000; 1 SUSPENSION OPHTHALMIC at 13:19

## 2025-02-03 RX ADMIN — HEPARIN SODIUM 5000 UNITS: 5000 INJECTION, SOLUTION INTRAVENOUS; SUBCUTANEOUS at 13:11

## 2025-02-03 RX ADMIN — HEPARIN SODIUM 5000 UNITS: 5000 INJECTION, SOLUTION INTRAVENOUS; SUBCUTANEOUS at 05:04

## 2025-02-03 RX ADMIN — SEVELAMER CARBONATE 800 MG: 800 TABLET, FILM COATED ORAL at 13:10

## 2025-02-03 RX ADMIN — INSULIN LISPRO 2 UNITS: 100 INJECTION, SOLUTION INTRAVENOUS; SUBCUTANEOUS at 17:40

## 2025-02-03 RX ADMIN — RENO CAPS 1 CAPSULE: 100; 1.5; 1.7; 20; 10; 1; 150; 5; 6 CAPSULE ORAL at 09:33

## 2025-02-03 RX ADMIN — VANCOMYCIN HYDROCHLORIDE 125 MG: KIT at 20:30

## 2025-02-03 RX ADMIN — VANCOMYCIN HYDROCHLORIDE 125 MG: KIT at 06:41

## 2025-02-03 RX ADMIN — NIFEDIPINE 90 MG: 90 TABLET, FILM COATED, EXTENDED RELEASE ORAL at 20:31

## 2025-02-03 RX ADMIN — VANCOMYCIN HYDROCHLORIDE 125 MG: KIT at 13:16

## 2025-02-03 RX ADMIN — PANTOPRAZOLE SODIUM 40 MG: 40 TABLET, DELAYED RELEASE ORAL at 06:41

## 2025-02-03 RX ADMIN — BRIMONIDINE TARTRATE 1 DROP: 2 SOLUTION/ DROPS OPHTHALMIC at 09:44

## 2025-02-03 RX ADMIN — NIFEDIPINE 90 MG: 90 TABLET, FILM COATED, EXTENDED RELEASE ORAL at 09:34

## 2025-02-03 RX ADMIN — SEVELAMER CARBONATE 800 MG: 800 TABLET, FILM COATED ORAL at 06:41

## 2025-02-03 RX ADMIN — INSULIN HUMAN 14 UNITS: 100 INJECTION, SUSPENSION SUBCUTANEOUS at 09:37

## 2025-02-03 RX ADMIN — CARVEDILOL 37.5 MG: 12.5 TABLET, FILM COATED ORAL at 20:31

## 2025-02-03 RX ADMIN — CINACALCET HYDROCHLORIDE 30 MG: 30 TABLET, FILM COATED ORAL at 09:34

## 2025-02-03 RX ADMIN — TACROLIMUS 0.5 MG: 0.5 CAPSULE ORAL at 09:34

## 2025-02-03 RX ADMIN — VANCOMYCIN HYDROCHLORIDE 125 MG: KIT at 02:11

## 2025-02-03 RX ADMIN — KETOROLAC TROMETHAMINE 1 DROP: 5 SOLUTION OPHTHALMIC at 13:19

## 2025-02-03 RX ADMIN — IMIQUIMOD 1 PACKET: 12.5 CREAM TOPICAL at 13:06

## 2025-02-03 RX ADMIN — KETOROLAC TROMETHAMINE 1 DROP: 5 SOLUTION OPHTHALMIC at 05:04

## 2025-02-03 RX ADMIN — BRIMONIDINE TARTRATE 1 DROP: 2 SOLUTION/ DROPS OPHTHALMIC at 20:34

## 2025-02-03 RX ADMIN — ISOSORBIDE MONONITRATE 60 MG: 30 TABLET, EXTENDED RELEASE ORAL at 09:33

## 2025-02-03 RX ADMIN — PREDNISONE 10 MG: 5 TABLET ORAL at 09:33

## 2025-02-03 RX ADMIN — NEOMYCIN SULFATE, POLYMYXIN B SULFATE AND DEXAMETHASONE 1 DROP: 3.5; 10000; 1 SUSPENSION OPHTHALMIC at 05:04

## 2025-02-03 RX ADMIN — TACROLIMUS 0.5 MG: 0.5 CAPSULE ORAL at 20:32

## 2025-02-03 RX ADMIN — NEOMYCIN SULFATE, POLYMYXIN B SULFATE AND DEXAMETHASONE 1 DROP: 3.5; 10000; 1 SUSPENSION OPHTHALMIC at 20:35

## 2025-02-03 RX ADMIN — PRAVASTATIN SODIUM 10 MG: 10 TABLET ORAL at 20:31

## 2025-02-03 RX ADMIN — CARVEDILOL 37.5 MG: 12.5 TABLET, FILM COATED ORAL at 09:33

## 2025-02-03 RX ADMIN — INSULIN LISPRO 2 UNITS: 100 INJECTION, SOLUTION INTRAVENOUS; SUBCUTANEOUS at 09:34

## 2025-02-03 ASSESSMENT — COGNITIVE AND FUNCTIONAL STATUS - GENERAL
DAILY ACTIVITIY SCORE: 24
CLIMB 3 TO 5 STEPS WITH RAILING: A LITTLE
MOBILITY SCORE: 23

## 2025-02-03 ASSESSMENT — PAIN SCALES - GENERAL
PAINLEVEL_OUTOF10: 0 - NO PAIN
PAINLEVEL_OUTOF10: 0 - NO PAIN

## 2025-02-03 NOTE — CARE PLAN
Problem: Diabetes  Goal: Achieve decreasing blood glucose levels by end of shift  Outcome: Progressing  Goal: Increase stability of blood glucose readings by end of shift  Outcome: Progressing  Goal: Decrease in ketones present in urine by end of shift  Outcome: Progressing  Goal: Maintain electrolyte levels within acceptable range throughout shift  Outcome: Progressing  Goal: Maintain glucose levels >70mg/dl to <250mg/dl throughout shift  Outcome: Progressing  Goal: No changes in neurological exam by end of shift  Outcome: Progressing  Goal: Learn about and adhere to nutrition recommendations by end of shift  Outcome: Progressing  Goal: Vital signs within normal range for age by end of shift  Outcome: Progressing  Goal: Increase self care and/or family involovement by end of shift  Outcome: Progressing  Goal: Receive DSME education by end of shift  Outcome: Progressing     Problem: Pain - Adult  Goal: Verbalizes/displays adequate comfort level or baseline comfort level  Outcome: Progressing     Problem: Discharge Planning  Goal: Discharge to home or other facility with appropriate resources  Outcome: Progressing     Problem: Chronic Conditions and Co-morbidities  Goal: Patient's chronic conditions and co-morbidity symptoms are monitored and maintained or improved  Outcome: Progressing   The patient's goals for the shift include pt. will have adequate IV access.    The clinical goals for the shift include patient will remain safe

## 2025-02-03 NOTE — PROGRESS NOTES
MEDICINE INPATIENT PROGRESS NOTE    Manolo Bashir is a 69 y.o. male on hospital day 8 who presented with Shortness of breath    Subjective   NAEO.    Patient seen this morning resting comfortably in bed. States that he is feeling much better compared to prior days, however is not sleeping well with people coming in to take his vitals. States that abdominal pain has resolved since starting the prednisone taper and that bowel movements are becoming more formed. Denies fever, chills, chest pain, dyspnea, abdominal pain, nausea, vomiting, leg swelling, and dysuria. No additional questions or concerns at this time.           Objective     Last Recorded Vitals  Vitals:    02/02/25 0929 02/02/25 1345 02/02/25 2112 02/03/25 0501   BP: 142/67 159/83 145/67 137/64   BP Location:   Right arm Right arm   Patient Position:   Lying Lying   Pulse: 70 71 79 65   Resp: 18 18 18 16   Temp: 36.7 °C (98.1 °F) 36.9 °C (98.4 °F) 37.3 °C (99.1 °F) 36.6 °C (97.9 °F)   TempSrc:   Temporal Temporal   SpO2: 96% 96% 96% 96%   Weight:       Height:           Intake/Output  No intake/output data recorded.    Physical Exam  Constitutional: Patient does not appear to be in any acute distress, AOx4  Cardio: RRR, S1/S2, no murmurs, rubs, or gallops  Pulm: CTAB, no respiratory distress.  GI: +BS, soft, non-tender, nondistended, no guarding or rebound, no masses noted  MSK: No joint swelling, normal movements of all extremities  Skin: No lesions, contusions, or erythema.  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, active fistula noted over medial aspect of LUE  Neuro: No focal deficits  Psych: Appropriate mood and behavior    Labs    CBC  Results from last 7 days   Lab Units 02/02/25  0542 02/01/25  0559 01/31/25  0552 01/30/25  0634 01/29/25  0629   HEMOGLOBIN g/dL 8.8* 8.6* 9.0* 8.8* 9.1*   HEMATOCRIT % 27.6* 26.3* 28.7* 27.2* 28.0*   WBC AUTO x10*3/uL 4.6 3.2* 2.7* 4.1* 3.4*   PLATELETS AUTO x10*3/uL 163  "160 85* 82* 80*      BMP  Results from last 7 days   Lab Units 02/02/25  0542 02/01/25  0559 01/31/25  1345 01/31/25  0552   SODIUM mmol/L 135* 129* 133* 132*   POTASSIUM mmol/L 3.7 4.5 4.8 5.8*   CHLORIDE mmol/L 96* 91* 93* 95*   CO2 mmol/L 31 27 31 25   BUN mg/dL 23 40* 30* 27*   CREATININE mg/dL 6.85* 11.01* 9.71* 8.91*   MAGNESIUM mg/dL 2.07 2.07 1.93 2.02   PHOSPHORUS mg/dL 3.0 4.4 3.9 3.5   ANION GAP mmol/L 12 16 14 18   GLUCOSE mg/dL 310* 349* 239* 375*   CALCIUM mg/dL 9.2 9.3 9.6 9.0     LFTS      COAGS        No lab exists for component: \"PT\"     Micro  No results found for the last 90 days.       Imaging  CT abdomen pelvis wo IV contrast    Result Date: 1/30/2025  1.  Edematous appearance of the left iliac fossa transplant kidney with surrounding mesenteric fat, not significantly changed compared to 01/05/2025 CT. Additionally, urinary bladder is decompressed, limiting evaluation, however there is unchanged marked perivesicular fat stranding scratch. Findings concerning for urinary tract infection/pyelonephritis for correlation with urinalysis. 2. Interval decrease in ground-glass opacities involving the bilateral lung bases. In the setting of cardiomegaly, increasing bilateral pleural effusions and interstitial septal thickening these findings are favored to represent pulmonary edema, however superimposed infection can not be excluded. 3. Additional stable chronic as detailed above, including mild splenomegaly.   I personally reviewed the images/study and I agree with the findings as stated by resident Jean Rowland. This study was interpreted at [facility], [place], [place].   MACRO: None   Signed by: Hue Aguilar 1/30/2025 9:44 AM Dictation workstation:   [id number]    XR chest 2 views    Result Date: 1/29/2025  1.  Almost complete interval resolution pulmonary edema, minimal on today's exam       MACRO: None   Signed by: Yohana Delgadillo 1/29/2025 2:56 PM " Dictation workstation:   [id number]    XR chest 2 views    Result Date: 1/26/2025  1.  Mild central pulmonary vascular congestion with patchy perihilar/bibasilar airspace opacities. Recommend correlation with patient's volume status as findings could reflect interstitial edema. Superimposed infectious process is not definitively excluded. 2. Small bilateral pleural effusions.   I personally reviewed the images/study and I agree with the findings as stated by resident Jean Rowland. This study was interpreted at [place], [place].   MACRO: None   Signed by: James Prasad 1/26/2025 9:31 AM Dictation workstation:   [id number]    CT abdomen pelvis wo IV contrast    Result Date: 1/5/2025  1. Interval development of marked fat stranding adjacent to the left iliac fossa renal transplant which appears edematous and heterogeneous, new compared to 09/10/2024 CT. No perinephric fluid collections. Findings concerning for urinary tract infection/pyelonephritis. Correlation with urinalysis is recommended. 2. Slight interval decrease in mild right pleural effusion with interval resolution of previously noted small left pleural effusion. Persistent bilateral ground-glass opacities with mild interlobular septal thickening, likely pulmonary edema. 3. Mild splenomegaly measuring up to 13.5 cm in craniocaudal dimension, which has improved compared to 14.5 cm on 09/10/2024 CT. 4. Additional findings as described above.   I personally reviewed the images/study and I agree with the findings as stated by Kendrick Purdy MD. This study was interpreted at [place], [place].   MACRO: None.   Signed by: Hue Aguilar 1/5/2025 2:17 PM Dictation workstation:   [id number]      Medications   Scheduled Medications  brimonidine, 1 drop, Right Eye, BID  carvedilol, 37.5 mg, oral, BID  cinacalcet, 30 mg, oral, Daily  epoetin aida or biosimilar, 8,000  Units, intravenous, Once per day on Tuesday Thursday Saturday  heparin (porcine), 5,000 Units, subcutaneous, q8h [facility]  imiquimod, 1 packet, Topical, Once per day on Monday Wednesday Friday  insulin glargine, 15 Units, subcutaneous, Nightly  insulin lispro, 0-10 Units, subcutaneous, TID AC  insulin NPH (Isophane), 14 Units, subcutaneous, q24h [facility]  isosorbide mononitrate ER, 60 mg, oral, Daily  ketorolac, 1 drop, Right Eye, TID  neomycin-polymyxin-dexAMETHasone, 1 drop, Right Eye, q8h [facility]  NIFEdipine ER, 90 mg, oral, BID  pantoprazole, 40 mg, oral, Daily before breakfast  paricalcitol, 10 mcg, intravenous, Once per day on Tuesday Thursday Saturday  pravastatin, 10 mg, oral, Nightly  predniSONE, 10 mg, oral, Daily   Followed by  [START ON 2/5/2025] predniSONE, 5 mg, oral, Daily  sevelamer carbonate, 800 mg, oral, TID AC  tacrolimus, 0.5 mg, oral, BID  vancomycin, 125 mg, oral, q6h  vitamin B complex-vitamin C-folic acid, 1 capsule, oral, Daily     Continuous Medications    PRN Medications  PRN medications: albuterol, benzonatate, dextrose, dextrose, glucagon, glucagon, HYDROmorphone, oxygen, polyethylene glycol          Assessment/Plan   Manolo Bashir is a 69 y.o. male with a PMH of ESRD (on dialysis, TThSa schedule, most recent dialysis 1/25), c/b impaired allograft function currently on immunosuppression (prednisone, tacrolimus), IgG and IgA lambda MGUS (BM biopsy 10/23 without evidence of myeloma), T2DM (on 20 units Lantus in AM, Lispro 3 units TID + SSI), HTN, prostate cancer (s/p radical prostatectomy 10/2013), HCV (s/p treatment, PCR negative 10/2023), and gastroparesis currently admitted for rejection of transplant surgery. Now pending date by transplant surgery for nephrectomy.    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  #Chronic immunosuppression  #Elevated BNP  - Patient febrile on admission to 38.4  - Hx of ESRD 2/2 HTN s/p 2 allographic kidney transplants c/b chronic rejection, dialysis TThSa, has not missed dialysis sessions  - Most recent TTE 4/4/24, EF 54%, dilated/hypertrophic left ventricle, dilated LA/RA, mild to moderate aortic regurgitation  - QTc on admission 450 ms  -Procal elevated to 0.95  - Troponin trend 65 -> 87 -> 86  -MRSA nares negative 1/26  -TTE 1/28 without significant change from prior  -RCRI of 4 (intraperitoneal procedure, insulin-dependent T2DM, HFpEF, Cr > 2)  PLAN  - Transplant nephrology consulted, appreciate recommendations                - Continue normal dialysis schedule TThSa                - Prednisone taper started 1/30, currently on day 1 of 10 mg (40 mg x 2 days, 20 mg x 2 days, 10 mg x 2 days, 5 mg x 2 days)  - Continue tacrolimus 0.5 mg BID  - Transplant surgery consulted for potential nephrectomy, appreciate recommendations  - Per nephrology:                - Continue DARIANA                - Continue calcitriol, on parenteral zemplar OP                - Continue sevelamer 800 mg TID  - CTM daily RFP, CBC  - Strict I/Os  - Daily standing weights as tolerated           #C. diff infection, improving  #Gastroparesis  #Nausea and vomiting,resolved  #Diarrhea, improving  - C. Diff PCR +, toxin negative, however given risk factors of immunocompromise, recent abx, and frequent hospital/health care setting visits, will treat  - Hx of C. Diff in 2024, treated with PO vancomycin  - nausea/vomiting may be partially 2/2 chronic transplant rejection  -Giardia, crypto negative  -CMV viral load low  - Stool O/P ordered, pending  PLAN  - Continue PO vancomycin 125 mg q6h x 10 days (through 2/6)  - CTM stool  output and abdominal exams  - Orthostatic vitals ordered        #AHRF 2/2 pulmonary edema, resolved  - Patient hypoxic to 88% on RA on admission, required 3L supplemental O2 briefly in ED, was weaned off  - CXR on admission with evidence of pulmonary vascular congestion, pulmonary edema and pleural effusions, cannot rule out potential infiltrate  - COVID/Flu/RSV negative, other viral URI studies negative so far  -Currently on room air since 1/27     Plan  - Tessalon Perles, guaifenesin, and albuterol nebs PRN for cough. Has not needed since 1/31        #IgG and IgA lambda MGUS  #Thrombocytopenia  - Thrombocytopenia chronic, however baseline ~90s, may be decreased in the setting of viral infection vs. Immunosuppression vs. MGUS  - 4T score 3, did receive heparin on prior admission 1/5-1/8 but low suspicion for HIT  PLAN  - CTM on daily CBC  - Continue home cinacalcet 30 mg daily     #Hx of recent cataract surgery  - Continue home eye drops:                - Maxitrol 1 drop R eye q8h                - brimonidine 2% solution 1 drop R eye BID                - ketorolac 0.5% solution 1 drop R eye q8h     #HTN  - Continue nifedepine 90 mg BID  - Continue home carvedilol 25 mg BID  - Continue home Imdur 60 mg daily     #T2DM  - Most recent A1c 9.1 12/16/24  PLAN  - Increase Lantus to 18 units in AM + #2 SSI  - D/C NPH tomorrow AM 2/4  - Consider initiation of glucommander while inpatient     #HLD  - Continue home pravastatin 10 mg daily     #GERD  - Continue home pantoprazole 40 mg daily    F: Replete PRN  E: Replete PRN  N: Adult diet Phosphorus restricted 1 gm, Renal, Consistent Carb; CCD 75 gm/meal; Potassium Restricted 2 gm (50mEq); 2 - 3 grams Sodium   A: PIV / Cedeño (date placed)    Oxygen: None  Abx: neomycin-polymyxin-dexAMETHasone - 3.5mg/mL-10,000 unit/mL-0.1 %, 3.5mg/mL-10,000 unit/mL-0.1 %  sulfamethoxazole-trimethoprim - 800-160 mg  vancomycin - 50 mg/mL     DVT Ppx: Heparin SubQ  GI Ppx: Pantoprazole  GI  Laxative: None    Code Status: full (confirmed on admission)  Surrogate Medical Decision-maker:  Amalia Enriquez ([phone])          Renay Cheung, MS4  Please Haiku with any questions or concerns.

## 2025-02-03 NOTE — PROGRESS NOTES
Transitional Care Coordination Progress Note:  Patient discussed during interdisciplinary rounds.   Team members present: MD, TCC  Plan per Medical/Surgical team: Shortness of breath  Payor: Medicare  Discharge disposition: Home  Potential Barriers: medical  ADOD: 1-2 days  Per MD, pending plans from transplant nephrology. Will continue to monitor for discharge planning needs.     Myah GHOSH, RN  Transitional Care Coordinator (TCC)  [phone]

## 2025-02-03 NOTE — SIGNIFICANT EVENT
Transplant Surgery Significant Event Note     Visited the patient this afternoon, patient endorsed he was having solid bowel movements, denied any LLQ pain, and denied any hematuria. Due to patient's resolution of symptoms, patient can be followed up on an outpatient basis in Dr. South's clinic and is okay for discharge.     Kathie Brumfield MD  General Surgery, PGY-1  Abdominal Transplant Surgery [id number] or [facility] Chat

## 2025-02-03 NOTE — PROGRESS NOTES
"Manolo Bashir is a 69 y.o. male on day 8 of admission presenting with Shortness of breath.    Subjective   Pt resting in bed. Denies sob,  fever, cough, chills, chest pains, light head, dizziness, constipation, n/v/d has improved, LLQ quad pain/tenderness improved       Objective     Physical Exam  Vitals and nursing note reviewed.   Cardiovascular:      Rate and Rhythm: Normal rate and regular rhythm.   Pulmonary:      Comments: Jeffrey lung sounds dim  POX 97% room air  Abdominal:      General: There is distension.   Genitourinary:     Comments: Heavy penile bleeding resolved. ...Transplant surgery consulted for potential nephrectomy  Musculoskeletal:      Comments: Ble without edema   Skin:     General: Skin is warm and dry.   Neurological:      Mental Status: He is alert and oriented to person, place, and time.   Psychiatric:         Mood and Affect: Mood normal.         Behavior: Behavior normal.         Last Recorded Vitals  Blood pressure 137/64, pulse 65, temperature 36.6 °C (97.9 °F), temperature source Temporal, resp. rate 16, height 1.727 m (5' 8\"), weight 72.6 kg (160 lb), SpO2 96%.  Intake/Output last 3 Shifts:  No intake/output data recorded.    Relevant Results  Scheduled medications  brimonidine, 1 drop, Right Eye, BID  carvedilol, 37.5 mg, oral, BID  cinacalcet, 30 mg, oral, Daily  epoetin aida or biosimilar, 8,000 Units, intravenous, Once per day on Tuesday Thursday Saturday  heparin (porcine), 5,000 Units, subcutaneous, q8h [facility]  imiquimod, 1 packet, Topical, Once per day on Monday Wednesday Friday  insulin glargine, 15 Units, subcutaneous, Nightly  insulin lispro, 0-10 Units, subcutaneous, TID AC  insulin NPH (Isophane), 14 Units, subcutaneous, q24h [facility]  isosorbide mononitrate ER, 60 mg, oral, Daily  ketorolac, 1 drop, Right Eye, TID  neomycin-polymyxin-dexAMETHasone, 1 drop, Right Eye, q8h [facility]  NIFEdipine ER, 90 mg, oral, BID  pantoprazole, 40 mg, oral, Daily before breakfast  paricalcitol, 10 mcg, " intravenous, Once per day on Tuesday Thursday Saturday  pravastatin, 10 mg, oral, Nightly  predniSONE, 10 mg, oral, Daily   Followed by  [START ON 2/5/2025] predniSONE, 5 mg, oral, Daily  sevelamer carbonate, 800 mg, oral, TID AC  tacrolimus, 0.5 mg, oral, BID  vancomycin, 125 mg, oral, q6h  vitamin B complex-vitamin C-folic acid, 1 capsule, oral, Daily      Continuous medications     PRN medications  PRN medications: albuterol, benzonatate, dextrose, dextrose, glucagon, glucagon, HYDROmorphone, oxygen, polyethylene glycol   Results for orders placed or performed during the hospital encounter of 01/26/25 (from the past 24 hours)   POCT GLUCOSE   Result Value Ref Range    POCT Glucose 306 (H) 74 - 99 mg/dL   POCT GLUCOSE   Result Value Ref Range    POCT Glucose 97 74 - 99 mg/dL   POCT GLUCOSE   Result Value Ref Range    POCT Glucose 254 (H) 74 - 99 mg/dL   POCT GLUCOSE   Result Value Ref Range    POCT Glucose 209 (H) 74 - 99 mg/dL   Magnesium   Result Value Ref Range    Magnesium 2.07 1.60 - 2.40 mg/dL   Renal Function Panel   Result Value Ref Range    Glucose 202 (H) 74 - 99 mg/dL    Sodium 135 (L) 136 - 145 mmol/L    Potassium 3.7 3.5 - 5.3 mmol/L    Chloride 96 (L) 98 - 107 mmol/L    Bicarbonate 30 21 - 32 mmol/L    Anion Gap 13 10 - 20 mmol/L    Urea Nitrogen 37 (H) 6 - 23 mg/dL    Creatinine 9.66 (H) 0.50 - 1.30 mg/dL    eGFR 5 (L) >60 mL/min/1.73m*2    Calcium 9.5 8.6 - 10.6 mg/dL    Phosphorus 2.8 2.5 - 4.9 mg/dL    Albumin 3.2 (L) 3.4 - 5.0 g/dL   CBC and Auto Differential   Result Value Ref Range    WBC 3.7 (L) 4.4 - 11.3 x10*3/uL    nRBC 0.0 0.0 - 0.0 /100 WBCs    RBC 3.39 (L) 4.50 - 5.90 x10*6/uL    Hemoglobin 9.6 (L) 13.5 - 17.5 g/dL    Hematocrit 30.3 (L) 41.0 - 52.0 %    MCV 89 80 - 100 fL    MCH 28.3 26.0 - 34.0 pg    MCHC 31.7 (L) 32.0 - 36.0 g/dL    RDW 16.1 (H) 11.5 - 14.5 %    Platelets 219 150 - 450 x10*3/uL    Neutrophils % 65.0 40.0 - 80.0 %    Immature Granulocytes %, Automated 1.9 (H) 0.0 -  0.9 %    Lymphocytes % 20.6 13.0 - 44.0 %    Monocytes % 11.5 2.0 - 10.0 %    Eosinophils % 0.5 0.0 - 6.0 %    Basophils % 0.5 0.0 - 2.0 %    Neutrophils Absolute 2.42 1.20 - 7.70 x10*3/uL    Immature Granulocytes Absolute, Automated 0.07 0.00 - 0.70 x10*3/uL    Lymphocytes Absolute 0.77 (L) 1.20 - 4.80 x10*3/uL    Monocytes Absolute 0.43 0.10 - 1.00 x10*3/uL    Eosinophils Absolute 0.02 0.00 - 0.70 x10*3/uL    Basophils Absolute 0.02 0.00 - 0.10 x10*3/uL   POCT GLUCOSE   Result Value Ref Range    POCT Glucose 159 (H) 74 - 99 mg/dL     *Note: Due to a large number of results and/or encounters for the requested time period, some results have not been displayed. A complete set of results can be found in Results Review.                       Assessment/Plan   Assessment & Plan  Shortness of breath    Tolerated hemodialysis Saturday with net fluid loss 2L    Bp slight elevated but stable with tx (131//90), euvolemic on exam . K+=3.7 improved from 5.8     Outpatient Dialysis schedule:    TTS [facility]/Dr Bridges     Access: lt fist with +thrill/bruit- no issues - able to achieve      Anemia of ESRD:1/31- epoetin aida (Procrit) injection 8,000 Units .. Current hgb 9.6 will cont to monitor..( Mircera 150mcg q2wks op)       CKD-MBD Phosphate Binder:cinacalcet (Sensipar) tablet 30 mg daily, paricalcitoL (Zemplar) injection 10 mcg TTS, sevelamer carbonate (Renvela) tablet 800 mg tid ac, vitamin B complex-vitamin C-folic acid (Nephrocaps) capsule 1 capsule daily     Plan HD tomorrow with UF as tolerated     Renal diet      Please obtain daily standing wt (if possible)     Medication to be adjusted for ESRD      Patient to continue regular HD schedule while inpatient and to follow with the outpatient nephrologist at discharge        Subsequent visit renal  I spent 35 minutes in the professional and overall care of this patient.      Juhi Vázquez, ANÍBAL-CNP

## 2025-02-04 ENCOUNTER — APPOINTMENT (OUTPATIENT)
Dept: DIALYSIS | Facility: HOSPITAL | Age: 70
End: 2025-02-04
Payer: MEDICARE

## 2025-02-04 LAB
ALBUMIN SERPL BCP-MCNC: 3 G/DL (ref 3.4–5)
ANION GAP SERPL CALC-SCNC: 15 MMOL/L (ref 10–20)
BASOPHILS # BLD AUTO: 0.02 X10*3/UL (ref 0–0.1)
BASOPHILS NFR BLD AUTO: 0.6 %
BUN SERPL-MCNC: 46 MG/DL (ref 6–23)
CALCIUM SERPL-MCNC: 8.8 MG/DL (ref 8.6–10.6)
CHLORIDE SERPL-SCNC: 95 MMOL/L (ref 98–107)
CO2 SERPL-SCNC: 27 MMOL/L (ref 21–32)
CREAT SERPL-MCNC: 11.35 MG/DL (ref 0.5–1.3)
EGFRCR SERPLBLD CKD-EPI 2021: 4 ML/MIN/1.73M*2
EOSINOPHIL # BLD AUTO: 0.07 X10*3/UL (ref 0–0.7)
EOSINOPHIL NFR BLD AUTO: 2 %
ERYTHROCYTE [DISTWIDTH] IN BLOOD BY AUTOMATED COUNT: 16.4 % (ref 11.5–14.5)
GLUCOSE BLD MANUAL STRIP-MCNC: 154 MG/DL (ref 74–99)
GLUCOSE BLD MANUAL STRIP-MCNC: 166 MG/DL (ref 74–99)
GLUCOSE BLD MANUAL STRIP-MCNC: 192 MG/DL (ref 74–99)
GLUCOSE BLD MANUAL STRIP-MCNC: 204 MG/DL (ref 74–99)
GLUCOSE SERPL-MCNC: 252 MG/DL (ref 74–99)
HBV SURFACE AB SER-ACNC: 31.4 MIU/ML
HBV SURFACE AG SERPL QL IA: NONREACTIVE
HCT VFR BLD AUTO: 28 % (ref 41–52)
HGB BLD-MCNC: 9 G/DL (ref 13.5–17.5)
IMM GRANULOCYTES # BLD AUTO: 0.06 X10*3/UL (ref 0–0.7)
IMM GRANULOCYTES NFR BLD AUTO: 1.7 % (ref 0–0.9)
LYMPHOCYTES # BLD AUTO: 0.58 X10*3/UL (ref 1.2–4.8)
LYMPHOCYTES NFR BLD AUTO: 16.7 %
MAGNESIUM SERPL-MCNC: 2.05 MG/DL (ref 1.6–2.4)
MCH RBC QN AUTO: 28.2 PG (ref 26–34)
MCHC RBC AUTO-ENTMCNC: 32.1 G/DL (ref 32–36)
MCV RBC AUTO: 88 FL (ref 80–100)
MONOCYTES # BLD AUTO: 0.37 X10*3/UL (ref 0.1–1)
MONOCYTES NFR BLD AUTO: 10.6 %
NEUTROPHILS # BLD AUTO: 2.38 X10*3/UL (ref 1.2–7.7)
NEUTROPHILS NFR BLD AUTO: 68.4 %
NRBC BLD-RTO: 0 /100 WBCS (ref 0–0)
PHOSPHATE SERPL-MCNC: 3.5 MG/DL (ref 2.5–4.9)
PLATELET # BLD AUTO: 197 X10*3/UL (ref 150–450)
POTASSIUM SERPL-SCNC: 3.8 MMOL/L (ref 3.5–5.3)
RBC # BLD AUTO: 3.19 X10*6/UL (ref 4.5–5.9)
SODIUM SERPL-SCNC: 133 MMOL/L (ref 136–145)
WBC # BLD AUTO: 3.5 X10*3/UL (ref 4.4–11.3)

## 2025-02-04 PROCEDURE — 2500000002 HC RX 250 W HCPCS SELF ADMINISTERED DRUGS (ALT 637 FOR MEDICARE OP, ALT 636 FOR OP/ED)

## 2025-02-04 PROCEDURE — 82947 ASSAY GLUCOSE BLOOD QUANT: CPT

## 2025-02-04 PROCEDURE — 85025 COMPLETE CBC W/AUTO DIFF WBC: CPT

## 2025-02-04 PROCEDURE — 2500000004 HC RX 250 GENERAL PHARMACY W/ HCPCS (ALT 636 FOR OP/ED)

## 2025-02-04 PROCEDURE — 99233 SBSQ HOSP IP/OBS HIGH 50: CPT | Performed by: STUDENT IN AN ORGANIZED HEALTH CARE EDUCATION/TRAINING PROGRAM

## 2025-02-04 PROCEDURE — 2500000001 HC RX 250 WO HCPCS SELF ADMINISTERED DRUGS (ALT 637 FOR MEDICARE OP)

## 2025-02-04 PROCEDURE — 1100000001 HC PRIVATE ROOM DAILY

## 2025-02-04 PROCEDURE — 80069 RENAL FUNCTION PANEL: CPT

## 2025-02-04 PROCEDURE — 2500000005 HC RX 250 GENERAL PHARMACY W/O HCPCS

## 2025-02-04 PROCEDURE — 2500000004 HC RX 250 GENERAL PHARMACY W/ HCPCS (ALT 636 FOR OP/ED): Mod: TB

## 2025-02-04 PROCEDURE — 2500000004 HC RX 250 GENERAL PHARMACY W/ HCPCS (ALT 636 FOR OP/ED): Performed by: INTERNAL MEDICINE

## 2025-02-04 PROCEDURE — 36415 COLL VENOUS BLD VENIPUNCTURE: CPT

## 2025-02-04 PROCEDURE — 2500000004 HC RX 250 GENERAL PHARMACY W/ HCPCS (ALT 636 FOR OP/ED): Performed by: NURSE PRACTITIONER

## 2025-02-04 PROCEDURE — 87340 HEPATITIS B SURFACE AG IA: CPT | Performed by: INTERNAL MEDICINE

## 2025-02-04 PROCEDURE — 6350000001 HC RX 635 EPOETIN >10,000 UNITS: Mod: JZ,TB | Performed by: INTERNAL MEDICINE

## 2025-02-04 PROCEDURE — 86706 HEP B SURFACE ANTIBODY: CPT | Performed by: INTERNAL MEDICINE

## 2025-02-04 PROCEDURE — 83735 ASSAY OF MAGNESIUM: CPT

## 2025-02-04 PROCEDURE — 90935 HEMODIALYSIS ONE EVALUATION: CPT | Performed by: INTERNAL MEDICINE

## 2025-02-04 RX ORDER — VANCOMYCIN HCL 50 MG/ML
125 SOLUTION, RECONSTITUTED, ORAL ORAL EVERY 6 HOURS
Qty: 30 ML | Refills: 0 | Status: CANCELLED | OUTPATIENT
Start: 2025-02-04 | End: 2025-02-07

## 2025-02-04 RX ADMIN — BRIMONIDINE TARTRATE 1 DROP: 2 SOLUTION/ DROPS OPHTHALMIC at 11:55

## 2025-02-04 RX ADMIN — SEVELAMER CARBONATE 800 MG: 800 TABLET, FILM COATED ORAL at 11:55

## 2025-02-04 RX ADMIN — PARICALCITOL 10 MCG: 5 INJECTION, SOLUTION INTRAVENOUS at 17:17

## 2025-02-04 RX ADMIN — KETOROLAC TROMETHAMINE 1 DROP: 5 SOLUTION OPHTHALMIC at 12:05

## 2025-02-04 RX ADMIN — EPOETIN ALFA-EPBX 10000 UNITS: 10000 INJECTION, SOLUTION INTRAVENOUS; SUBCUTANEOUS at 20:30

## 2025-02-04 RX ADMIN — SEVELAMER CARBONATE 800 MG: 800 TABLET, FILM COATED ORAL at 17:18

## 2025-02-04 RX ADMIN — NIFEDIPINE 90 MG: 90 TABLET, FILM COATED, EXTENDED RELEASE ORAL at 20:28

## 2025-02-04 RX ADMIN — VANCOMYCIN HYDROCHLORIDE 125 MG: KIT at 02:00

## 2025-02-04 RX ADMIN — HYDROMORPHONE HYDROCHLORIDE 0.4 MG: 0.5 INJECTION, SOLUTION INTRAMUSCULAR; INTRAVENOUS; SUBCUTANEOUS at 20:40

## 2025-02-04 RX ADMIN — INSULIN LISPRO 2 UNITS: 100 INJECTION, SOLUTION INTRAVENOUS; SUBCUTANEOUS at 12:05

## 2025-02-04 RX ADMIN — NEOMYCIN SULFATE, POLYMYXIN B SULFATE AND DEXAMETHASONE 1 DROP: 3.5; 10000; 1 SUSPENSION OPHTHALMIC at 05:00

## 2025-02-04 RX ADMIN — KETOROLAC TROMETHAMINE 1 DROP: 5 SOLUTION OPHTHALMIC at 05:00

## 2025-02-04 RX ADMIN — CARVEDILOL 37.5 MG: 12.5 TABLET, FILM COATED ORAL at 12:04

## 2025-02-04 RX ADMIN — PANTOPRAZOLE SODIUM 40 MG: 40 TABLET, DELAYED RELEASE ORAL at 11:55

## 2025-02-04 RX ADMIN — RENO CAPS 1 CAPSULE: 100; 1.5; 1.7; 20; 10; 1; 150; 5; 6 CAPSULE ORAL at 11:55

## 2025-02-04 RX ADMIN — CARVEDILOL 37.5 MG: 12.5 TABLET, FILM COATED ORAL at 20:27

## 2025-02-04 RX ADMIN — INSULIN GLARGINE 18 UNITS: 100 INJECTION, SOLUTION SUBCUTANEOUS at 12:05

## 2025-02-04 RX ADMIN — KETOROLAC TROMETHAMINE 1 DROP: 5 SOLUTION OPHTHALMIC at 20:33

## 2025-02-04 RX ADMIN — VANCOMYCIN HYDROCHLORIDE 125 MG: KIT at 17:18

## 2025-02-04 RX ADMIN — NEOMYCIN SULFATE, POLYMYXIN B SULFATE AND DEXAMETHASONE 1 DROP: 3.5; 10000; 1 SUSPENSION OPHTHALMIC at 20:33

## 2025-02-04 RX ADMIN — PRAVASTATIN SODIUM 10 MG: 10 TABLET ORAL at 20:28

## 2025-02-04 RX ADMIN — CINACALCET HYDROCHLORIDE 30 MG: 30 TABLET, FILM COATED ORAL at 11:55

## 2025-02-04 RX ADMIN — TACROLIMUS 0.5 MG: 0.5 CAPSULE ORAL at 20:28

## 2025-02-04 RX ADMIN — NIFEDIPINE 90 MG: 90 TABLET, FILM COATED, EXTENDED RELEASE ORAL at 12:04

## 2025-02-04 RX ADMIN — PREDNISONE 10 MG: 5 TABLET ORAL at 11:55

## 2025-02-04 RX ADMIN — VANCOMYCIN HYDROCHLORIDE 125 MG: KIT at 11:54

## 2025-02-04 RX ADMIN — BRIMONIDINE TARTRATE 1 DROP: 2 SOLUTION/ DROPS OPHTHALMIC at 20:33

## 2025-02-04 RX ADMIN — TACROLIMUS 0.5 MG: 0.5 CAPSULE ORAL at 11:55

## 2025-02-04 RX ADMIN — NEOMYCIN SULFATE, POLYMYXIN B SULFATE AND DEXAMETHASONE 1 DROP: 3.5; 10000; 1 SUSPENSION OPHTHALMIC at 12:04

## 2025-02-04 RX ADMIN — ISOSORBIDE MONONITRATE 60 MG: 30 TABLET, EXTENDED RELEASE ORAL at 12:04

## 2025-02-04 RX ADMIN — INSULIN LISPRO 2 UNITS: 100 INJECTION, SOLUTION INTRAVENOUS; SUBCUTANEOUS at 17:18

## 2025-02-04 ASSESSMENT — COGNITIVE AND FUNCTIONAL STATUS - GENERAL
MOBILITY SCORE: 24
DAILY ACTIVITIY SCORE: 24
MOBILITY SCORE: 24
DAILY ACTIVITIY SCORE: 24

## 2025-02-04 ASSESSMENT — PAIN SCALES - GENERAL
PAINLEVEL_OUTOF10: 10 - WORST POSSIBLE PAIN
PAINLEVEL_OUTOF10: 0 - NO PAIN

## 2025-02-04 ASSESSMENT — PAIN - FUNCTIONAL ASSESSMENT: PAIN_FUNCTIONAL_ASSESSMENT: NO/DENIES PAIN

## 2025-02-04 NOTE — PROCEDURES
"DIALYSIS NOTE:      Seen  during hemodialysis, undergoing treatment per submitted orders: 3 K, 2.5 Ca, 3.75  hours. Fluid removal 1 liter as tolerated (keep SBP> 90mmHg).     /72   Pulse 65   Temp 36.4 °C (97.5 °F) (Skin)   Resp 16   Ht 1.727 m (5' 8\")   Wt 73.3 kg (161 lb 9.6 oz)   SpO2 99%   BMI 24.57 kg/m²     Additional Recommendations:  Increase Retacrit to 10,000 units  3 x week.     Scheduled medications  brimonidine, 1 drop, Right Eye, BID  carvedilol, 37.5 mg, oral, BID  cinacalcet, 30 mg, oral, Daily  epoetin aida or biosimilar, 8,000 Units, intravenous, Once per day on Tuesday Thursday Saturday  heparin (porcine), 5,000 Units, subcutaneous, q8h [facility]  imiquimod, 1 packet, Topical, Once per day on Monday Wednesday Friday  insulin glargine, 18 Units, subcutaneous, q AM  insulin lispro, 0-10 Units, subcutaneous, TID AC  isosorbide mononitrate ER, 60 mg, oral, Daily  ketorolac, 1 drop, Right Eye, TID  neomycin-polymyxin-dexAMETHasone, 1 drop, Right Eye, q8h [facility]  NIFEdipine ER, 90 mg, oral, BID  pantoprazole, 40 mg, oral, Daily before breakfast  paricalcitol, 10 mcg, intravenous, Once per day on Tuesday Thursday Saturday  pravastatin, 10 mg, oral, Nightly  predniSONE, 10 mg, oral, Daily   Followed by  [START ON 2/5/2025] predniSONE, 5 mg, oral, Daily  sevelamer carbonate, 800 mg, oral, TID AC  tacrolimus, 0.5 mg, oral, BID  vancomycin, 125 mg, oral, q6h  vitamin B complex-vitamin C-folic acid, 1 capsule, oral, Daily      Continuous medications     PRN medications  PRN medications: albuterol, benzonatate, dextrose, dextrose, glucagon, glucagon, HYDROmorphone, oxygen, polyethylene glycol    Recent Results (from the past 24 hours)   POCT GLUCOSE    Collection Time: 02/03/25  1:06 PM   Result Value Ref Range    POCT Glucose 105 (H) 74 - 99 mg/dL   POCT GLUCOSE    Collection Time: 02/03/25  5:31 PM   Result Value Ref Range    POCT Glucose 187 (H) 74 - 99 mg/dL   POCT GLUCOSE    Collection Time: 02/03/25  " 8:52 PM   Result Value Ref Range    POCT Glucose 395 (H) 74 - 99 mg/dL   Magnesium    Collection Time: 02/04/25  7:11 AM   Result Value Ref Range    Magnesium 2.05 1.60 - 2.40 mg/dL   Renal Function Panel    Collection Time: 02/04/25  7:11 AM   Result Value Ref Range    Glucose 252 (H) 74 - 99 mg/dL    Sodium 133 (L) 136 - 145 mmol/L    Potassium 3.8 3.5 - 5.3 mmol/L    Chloride 95 (L) 98 - 107 mmol/L    Bicarbonate 27 21 - 32 mmol/L    Anion Gap 15 10 - 20 mmol/L    Urea Nitrogen 46 (H) 6 - 23 mg/dL    Creatinine 11.35 (H) 0.50 - 1.30 mg/dL    eGFR 4 (L) >60 mL/min/1.73m*2    Calcium 8.8 8.6 - 10.6 mg/dL    Phosphorus 3.5 2.5 - 4.9 mg/dL    Albumin 3.0 (L) 3.4 - 5.0 g/dL   CBC and Auto Differential    Collection Time: 02/04/25  7:11 AM   Result Value Ref Range    WBC 3.5 (L) 4.4 - 11.3 x10*3/uL    nRBC 0.0 0.0 - 0.0 /100 WBCs    RBC 3.19 (L) 4.50 - 5.90 x10*6/uL    Hemoglobin 9.0 (L) 13.5 - 17.5 g/dL    Hematocrit 28.0 (L) 41.0 - 52.0 %    MCV 88 80 - 100 fL    MCH 28.2 26.0 - 34.0 pg    MCHC 32.1 32.0 - 36.0 g/dL    RDW 16.4 (H) 11.5 - 14.5 %    Platelets 197 150 - 450 x10*3/uL    Neutrophils % 68.4 40.0 - 80.0 %    Immature Granulocytes %, Automated 1.7 (H) 0.0 - 0.9 %    Lymphocytes % 16.7 13.0 - 44.0 %    Monocytes % 10.6 2.0 - 10.0 %    Eosinophils % 2.0 0.0 - 6.0 %    Basophils % 0.6 0.0 - 2.0 %    Neutrophils Absolute 2.38 1.20 - 7.70 x10*3/uL    Immature Granulocytes Absolute, Automated 0.06 0.00 - 0.70 x10*3/uL    Lymphocytes Absolute 0.58 (L) 1.20 - 4.80 x10*3/uL    Monocytes Absolute 0.37 0.10 - 1.00 x10*3/uL    Eosinophils Absolute 0.07 0.00 - 0.70 x10*3/uL    Basophils Absolute 0.02 0.00 - 0.10 x10*3/uL   Hepatitis B surface antibody    Collection Time: 02/04/25  7:11 AM   Result Value Ref Range    Hepatitis B Surface AB 31.4 (H) <10.0 mIU/mL   Hepatitis B surface antigen    Collection Time: 02/04/25  7:11 AM   Result Value Ref Range    Hepatitis B Surface AG Nonreactive Nonreactive

## 2025-02-04 NOTE — NURSING NOTE
Report from Sending RN:    Report From: Dyana  Recent Surgery of Procedure: No  Baseline Level of Consciousness (LOC): a and o x 4  Oxygen Use: No  Type: ra  Diabetic: Yes  Last BP Med Given Day of Dialysis: None  Last Pain Med Given: None  Lab Tests to be Obtained with Dialysis: Yes  Blood Transfusion to be Given During Dialysis: No  Available IV Access: Yes  Medications to be Administered During Dialysis: No  Continuous IV Infusion Running: No  Restraints on Currently or in the Last 24 Hours: N/A  Hand-Off Communication: Can stand for a wt, quiet night, remains on contact precautions  Dialysis Catheter Dressing: AVG  Last Dressing Change: AVG

## 2025-02-04 NOTE — PROGRESS NOTES
MEDICINE INPATIENT PROGRESS NOTE    Manolo Bashir is a 69 y.o. male on hospital day 9 who presented with Shortness of breath    Subjective   NAEO.    Patient seen this morning resting comfortably in dialysis unit. Patient reports feeling better and is ready to go home today. BM have improved and are more formed than previously, also not having blood from his urethra anymore. Denies fever, chills, chest pain, dyspnea, abdominal pain, nausea, vomiting, leg swelling. No additional questions or concerns at this time.           Objective     Last Recorded Vitals  Vitals:    02/03/25 2050 02/04/25 0632 02/04/25 0723 02/04/25 1129   BP: 167/78 153/72     BP Location:       Patient Position:       Pulse: 73 71 65 80   Resp: 16 16     Temp: 36.8 °C (98.2 °F) 36.7 °C (98.1 °F) 36.4 °C (97.5 °F) 36.2 °C (97.2 °F)   TempSrc:   Skin Skin   SpO2: 99% 99%     Weight:   73.3 kg (161 lb 9.6 oz)    Height:           Intake/Output  No intake/output data recorded.    Physical Exam  Constitutional: Patient does not appear to be in any acute distress, AOx4  Cardio: RRR, S1/S2, no murmurs, rubs, or gallops  Pulm: CTAB, no respiratory distress.  GI: +BS, soft, non-tender, nondistended, no guarding or rebound, no masses noted  MSK: No joint swelling, normal movements of all extremities  Skin: No lesions, contusions, or erythema.  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, active fistula noted over medial aspect of LUE, currently accessed for HD  Neuro: No focal deficits  Psych: Appropriate mood and behavior    Labs    CBC  Results from last 7 days   Lab Units 02/04/25  0711 02/03/25  0605 02/02/25  0542 02/01/25  0559 01/31/25  0552   HEMOGLOBIN g/dL 9.0* 9.6* 8.8* 8.6* 9.0*   HEMATOCRIT % 28.0* 30.3* 27.6* 26.3* 28.7*   WBC AUTO x10*3/uL 3.5* 3.7* 4.6 3.2* 2.7*   PLATELETS AUTO x10*3/uL 197 219 163 160 85*      BMP  Results from last 7 days   Lab Units 02/04/25  0711 02/03/25  0605  "02/02/25  0542 02/01/25  0559   SODIUM mmol/L 133* 135* 135* 129*   POTASSIUM mmol/L 3.8 3.7 3.7 4.5   CHLORIDE mmol/L 95* 96* 96* 91*   CO2 mmol/L 27 30 31 27   BUN mg/dL 46* 37* 23 40*   CREATININE mg/dL 11.35* 9.66* 6.85* 11.01*   MAGNESIUM mg/dL 2.05 2.07 2.07 2.07   PHOSPHORUS mg/dL 3.5 2.8 3.0 4.4   ANION GAP mmol/L 15 13 12 16   GLUCOSE mg/dL 252* 202* 310* 349*   CALCIUM mg/dL 8.8 9.5 9.2 9.3     LFTS      COAGS        No lab exists for component: \"PT\"     Micro  No results found for the last 90 days.       @(RESUFAST[URINECULTURE,BLOODCULT,CSFCULTSMEAR)@    Imaging  CT abdomen pelvis wo IV contrast    Result Date: 1/30/2025  1.  Edematous appearance of the left iliac fossa transplant kidney with surrounding mesenteric fat, not significantly changed compared to 01/05/2025 CT. Additionally, urinary bladder is decompressed, limiting evaluation, however there is unchanged marked perivesicular fat stranding scratch. Findings concerning for urinary tract infection/pyelonephritis for correlation with urinalysis. 2. Interval decrease in ground-glass opacities involving the bilateral lung bases. In the setting of cardiomegaly, increasing bilateral pleural effusions and interstitial septal thickening these findings are favored to represent pulmonary edema, however superimposed infection can not be excluded. 3. Additional stable chronic as detailed above, including mild splenomegaly.   I personally reviewed the images/study and I agree with the findings as stated by resident Jean Rowland. This study was interpreted at [facility], [place], [place].   MACRO: None   Signed by: Hue Aguilar 1/30/2025 9:44 AM Dictation workstation:   [id number]    XR chest 2 views    Result Date: 1/29/2025  1.  Almost complete interval resolution pulmonary edema, minimal on today's exam       MACRO: None   Signed by: Yohana Delgadillo 1/29/2025 2:56 PM Dictation workstation:   [id number]    XR chest " 2 views    Result Date: 1/26/2025  1.  Mild central pulmonary vascular congestion with patchy perihilar/bibasilar airspace opacities. Recommend correlation with patient's volume status as findings could reflect interstitial edema. Superimposed infectious process is not definitively excluded. 2. Small bilateral pleural effusions.   I personally reviewed the images/study and I agree with the findings as stated by resident Jean Rowland. This study was interpreted at [place], [place].   MACRO: None   Signed by: James Prasad 1/26/2025 9:31 AM Dictation workstation:   [id number]    CT abdomen pelvis wo IV contrast    Result Date: 1/5/2025  1. Interval development of marked fat stranding adjacent to the left iliac fossa renal transplant which appears edematous and heterogeneous, new compared to 09/10/2024 CT. No perinephric fluid collections. Findings concerning for urinary tract infection/pyelonephritis. Correlation with urinalysis is recommended. 2. Slight interval decrease in mild right pleural effusion with interval resolution of previously noted small left pleural effusion. Persistent bilateral ground-glass opacities with mild interlobular septal thickening, likely pulmonary edema. 3. Mild splenomegaly measuring up to 13.5 cm in craniocaudal dimension, which has improved compared to 14.5 cm on 09/10/2024 CT. 4. Additional findings as described above.   I personally reviewed the images/study and I agree with the findings as stated by Kendrick Purdy MD. This study was interpreted at [place], [place].   MACRO: None.   Signed by: Hue Aguilar 1/5/2025 2:17 PM Dictation workstation:   [id number]      Medications   Scheduled Medications  brimonidine, 1 drop, Right Eye, BID  carvedilol, 37.5 mg, oral, BID  cinacalcet, 30 mg, oral, Daily  epoetin aida or biosimilar, 10,000 Units, intravenous, Once per day on Tuesday Thursday  Saturday  heparin (porcine), 5,000 Units, subcutaneous, q8h [facility]  imiquimod, 1 packet, Topical, Once per day on Monday Wednesday Friday  insulin glargine, 18 Units, subcutaneous, q AM  insulin lispro, 0-10 Units, subcutaneous, TID AC  isosorbide mononitrate ER, 60 mg, oral, Daily  ketorolac, 1 drop, Right Eye, TID  neomycin-polymyxin-dexAMETHasone, 1 drop, Right Eye, q8h [facility]  NIFEdipine ER, 90 mg, oral, BID  pantoprazole, 40 mg, oral, Daily before breakfast  paricalcitol, 10 mcg, intravenous, Once per day on Tuesday Thursday Saturday  pravastatin, 10 mg, oral, Nightly  predniSONE, 10 mg, oral, Daily   Followed by  [START ON 2/5/2025] predniSONE, 5 mg, oral, Daily  sevelamer carbonate, 800 mg, oral, TID AC  tacrolimus, 0.5 mg, oral, BID  vancomycin, 125 mg, oral, q6h  vitamin B complex-vitamin C-folic acid, 1 capsule, oral, Daily     Continuous Medications    PRN Medications  PRN medications: albuterol, benzonatate, dextrose, dextrose, glucagon, glucagon, HYDROmorphone, oxygen, polyethylene glycol          Assessment/Plan   Manolo Bashir is a 69 y.o. male with a PMH of ESRD (on dialysis, TTa schedule, most recent dialysis 1/25), c/b impaired allograft function currently on immunosuppression (prednisone, tacrolimus), IgG and IgA lambda MGUS (BM biopsy 10/23 without evidence of myeloma), T2DM (on 20 units Lantus in AM, Lispro 3 units TID + SSI), HTN, prostate cancer (s/p radical prostatectomy 10/2013), HCV (s/p treatment, PCR negative 10/2023), and gastroparesis currently admitted for rejection of kidney transplant and C. Diff. Currently finishing treatment for C. Diff and optimizing his insulin regimen.    Updates 2/4/25  - Patient had dialysis today, 1L fluid removed, tolerated well  - Discontinuing NPH insulin, starting back on home Lantus 18 units in AM with SSI  - Discharge pending improvement in glucose control and C. 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  #Chronic immunosuppression  #Elevated BNP  - Patient febrile on admission to 38.4  - Hx of ESRD 2/2 HTN s/p 2 allographic kidney transplants c/b chronic rejection, dialysis TThSa, has not missed dialysis sessions  - Most recent TTE 4/4/24, EF 54%, dilated/hypertrophic left ventricle, dilated LA/RA, mild to moderate aortic regurgitation  - QTc on admission 450 ms  -Procal elevated to 0.95  - Troponin trend 65 -> 87 -> 86  -MRSA nares negative 1/26  -TTE 1/28 without significant change from prior  -RCRI of 4 (intraperitoneal procedure, insulin-dependent T2DM, HFpEF, Cr > 2)  PLAN  - Transplant nephrology consulted, appreciate recommendations                - Continue normal dialysis schedule TThSa                - Prednisone taper started 1/30, currently on day 2 of 10 mg (40 mg x 2 days, 20 mg x 2 days, 10 mg x 2 days, then 5 mg indefinitely)  - Continue tacrolimus 0.5 mg BID  - Transplant surgery consulted for potential nephrectomy, appreciate recommendations   - Plan for OP follow-up for potential nephrectomy  - Per nephrology:                - Continue DARIANA                - Continue calcitriol, on parenteral zemplar OP                - Continue sevelamer 800 mg TID  - CTM daily RFP, CBC  - Strict I/Os  - Daily standing weights as tolerated     #C. diff infection, improving  #Gastroparesis  #Nausea and vomiting,resolved  #Diarrhea, improving  - C. Diff PCR +, toxin negative, however given risk factors of immunocompromise, recent abx, and frequent hospital/health care setting visits, will treat  - Hx of C. Diff in 2024, treated with PO vancomycin  - nausea/vomiting may be partially 2/2 chronic transplant rejection  -Giardia, crypto negative  -CMV viral load low  - Stool O/P ordered, pending  PLAN  - Continue PO vancomycin 125 mg q6h x 10 days (through 2/6)  - CTM stool output and abdominal exams  - Orthostatic vitals ordered        #AHRF 2/2 pulmonary edema, resolved  -  Patient hypoxic to 88% on RA on admission, required 3L supplemental O2 briefly in ED, was weaned off  - CXR on admission with evidence of pulmonary vascular congestion, pulmonary edema and pleural effusions, cannot rule out potential infiltrate  - COVID/Flu/RSV negative, other viral URI studies negative so far  -Currently on room air since 1/27     Plan  - Tessalon Perles, guaifenesin, and albuterol nebs PRN for cough. Has not needed since 1/31        #IgG and IgA lambda MGUS  #Thrombocytopenia  - Thrombocytopenia chronic, however baseline ~90s, may be decreased in the setting of viral infection vs. Immunosuppression vs. MGUS  - 4T score 3, did receive heparin on prior admission 1/5-1/8 but low suspicion for HIT  PLAN  - CTM on daily CBC  - Continue home cinacalcet 30 mg daily     #Hx of recent cataract surgery  - Continue home eye drops:                - Maxitrol 1 drop R eye q8h                - brimonidine 2% solution 1 drop R eye BID                - ketorolac 0.5% solution 1 drop R eye q8h     #HTN  - Continue nifedepine 90 mg BID  - Continue home carvedilol 25 mg BID  - Continue home Imdur 60 mg daily     #T2DM  - Most recent A1c 9.1 12/16/24  PLAN  - Restart Lantus 18 units in AM + #2 SSI  - Consider initiation of glucommander while inpatient     #HLD  - Continue home pravastatin 10 mg daily     #GERD  - Continue home pantoprazole 40 mg daily    F: Replete PRN  E: Replete PRN  N: Adult diet Phosphorus restricted 1 gm, Renal, Consistent Carb; CCD 75 gm/meal; Potassium Restricted 2 gm (50mEq); 2 - 3 grams Sodium   A: PIV    Oxygen: None  Abx: neomycin-polymyxin-dexAMETHasone - 3.5mg/mL-10,000 unit/mL-0.1 %, 3.5mg/mL-10,000 unit/mL-0.1 %  sulfamethoxazole-trimethoprim - 800-160 mg  vancomycin - 50 mg/mL     DVT Ppx: Heparin SubQ  GI Ppx: Pantoprazole  GI Laxative: Miralax     Code Status: full (confirmed on admission)  Surrogate Medical Decision-maker:  Amalia Enriquez ([phone])          Renay Cheung,  MS4  Please Haiku with any questions or concerns.

## 2025-02-04 NOTE — NURSING NOTE
Report to Receiving RN:    Report To: AYO Frederick  Time Report Called: 1115  Hand-Off Communication: Pt completed 3.75 hrs, 1L fluid removed, tolerated tx, /93, HR 78, pt stable, A/O.   Complications During Treatment: No  Ultrafiltration Treatment: Yes  Medications Administered During Dialysis: No  Blood Products Administered During Dialysis: No  Labs Sent During Dialysis: Yes  Heparin Drip Rate Changes: No  Dialysis Catheter Dressing: N/A, AVF  Last Dressing Change: NA    Last Updated: 11:21 AM by SUJIT WAYNE

## 2025-02-04 NOTE — CARE PLAN
Problem: Diabetes  Goal: Achieve decreasing blood glucose levels by end of shift  Outcome: Progressing  Goal: Increase stability of blood glucose readings by end of shift  Outcome: Progressing  Goal: Decrease in ketones present in urine by end of shift  Outcome: Progressing  Goal: Maintain electrolyte levels within acceptable range throughout shift  Outcome: Progressing  Goal: Maintain glucose levels >70mg/dl to <250mg/dl throughout shift  Outcome: Progressing     Problem: Pain - Adult  Goal: Verbalizes/displays adequate comfort level or baseline comfort level  Outcome: Progressing     Problem: Safety - Adult  Goal: Free from fall injury  Outcome: Progressing     Problem: Discharge Planning  Goal: Discharge to home or other facility with appropriate resources  Outcome: Progressing     Problem: Chronic Conditions and Co-morbidities  Goal: Patient's chronic conditions and co-morbidity symptoms are monitored and maintained or improved  Outcome: Progressing     Problem: Nutrition  Goal: Nutrient intake appropriate for maintaining nutritional needs  Outcome: Progressing   The patient's goals for the shift include pt. will have adequate IV access.    The clinical goals for the shift include pts blood glucose will be wdl through shift

## 2025-02-04 NOTE — CARE PLAN
Problem: Diabetes  Goal: Achieve decreasing blood glucose levels by end of shift  Outcome: Progressing     Problem: Pain - Adult  Goal: Verbalizes/displays adequate comfort level or baseline comfort level  Outcome: Progressing     Problem: Safety - Adult  Goal: Free from fall injury  Outcome: Progressing     Problem: Discharge Planning  Goal: Discharge to home or other facility with appropriate resources  Outcome: Progressing   The patient's goals for the shift include pt. will have adequate IV access.    The clinical goals for the shift include patient will remain safe

## 2025-02-05 ENCOUNTER — PHARMACY VISIT (OUTPATIENT)
Dept: PHARMACY | Facility: CLINIC | Age: 70
End: 2025-02-05
Payer: COMMERCIAL

## 2025-02-05 ENCOUNTER — TELEPHONE (OUTPATIENT)
Facility: HOSPITAL | Age: 70
End: 2025-02-05

## 2025-02-05 VITALS
OXYGEN SATURATION: 99 % | DIASTOLIC BLOOD PRESSURE: 75 MMHG | SYSTOLIC BLOOD PRESSURE: 170 MMHG | BODY MASS INDEX: 24.49 KG/M2 | WEIGHT: 161.6 LBS | HEART RATE: 79 BPM | RESPIRATION RATE: 17 BRPM | TEMPERATURE: 99 F | HEIGHT: 68 IN

## 2025-02-05 LAB
ALBUMIN SERPL BCP-MCNC: 2.9 G/DL (ref 3.4–5)
ANION GAP SERPL CALC-SCNC: 12 MMOL/L (ref 10–20)
ATRIAL RATE: 69 BPM
BASOPHILS # BLD AUTO: 0.02 X10*3/UL (ref 0–0.1)
BASOPHILS NFR BLD AUTO: 0.5 %
BUN SERPL-MCNC: 31 MG/DL (ref 6–23)
CALCIUM SERPL-MCNC: 8.9 MG/DL (ref 8.6–10.6)
CHLORIDE SERPL-SCNC: 98 MMOL/L (ref 98–107)
CO2 SERPL-SCNC: 30 MMOL/L (ref 21–32)
CREAT SERPL-MCNC: 7.57 MG/DL (ref 0.5–1.3)
EGFRCR SERPLBLD CKD-EPI 2021: 7 ML/MIN/1.73M*2
EOSINOPHIL # BLD AUTO: 0.03 X10*3/UL (ref 0–0.7)
EOSINOPHIL NFR BLD AUTO: 0.8 %
ERYTHROCYTE [DISTWIDTH] IN BLOOD BY AUTOMATED COUNT: 16.9 % (ref 11.5–14.5)
GLUCOSE BLD MANUAL STRIP-MCNC: 171 MG/DL (ref 74–99)
GLUCOSE BLD MANUAL STRIP-MCNC: 190 MG/DL (ref 74–99)
GLUCOSE SERPL-MCNC: 182 MG/DL (ref 74–99)
HCT VFR BLD AUTO: 26.3 % (ref 41–52)
HGB BLD-MCNC: 8.7 G/DL (ref 13.5–17.5)
IMM GRANULOCYTES # BLD AUTO: 0.05 X10*3/UL (ref 0–0.7)
IMM GRANULOCYTES NFR BLD AUTO: 1.3 % (ref 0–0.9)
LYMPHOCYTES # BLD AUTO: 0.7 X10*3/UL (ref 1.2–4.8)
LYMPHOCYTES NFR BLD AUTO: 17.6 %
MAGNESIUM SERPL-MCNC: 2.02 MG/DL (ref 1.6–2.4)
MCH RBC QN AUTO: 28.6 PG (ref 26–34)
MCHC RBC AUTO-ENTMCNC: 33.1 G/DL (ref 32–36)
MCV RBC AUTO: 87 FL (ref 80–100)
MONOCYTES # BLD AUTO: 0.45 X10*3/UL (ref 0.1–1)
MONOCYTES NFR BLD AUTO: 11.3 %
NEUTROPHILS # BLD AUTO: 2.72 X10*3/UL (ref 1.2–7.7)
NEUTROPHILS NFR BLD AUTO: 68.5 %
NRBC BLD-RTO: 0 /100 WBCS (ref 0–0)
P AXIS: 76 DEGREES
P OFFSET: 179 MS
P ONSET: 121 MS
PHOSPHATE SERPL-MCNC: 2.9 MG/DL (ref 2.5–4.9)
PLATELET # BLD AUTO: 149 X10*3/UL (ref 150–450)
POTASSIUM SERPL-SCNC: 4 MMOL/L (ref 3.5–5.3)
PR INTERVAL: 186 MS
Q ONSET: 214 MS
QRS COUNT: 11 BEATS
QRS DURATION: 144 MS
QT INTERVAL: 432 MS
QTC CALCULATION(BAZETT): 462 MS
QTC FREDERICIA: 452 MS
R AXIS: -39 DEGREES
RBC # BLD AUTO: 3.04 X10*6/UL (ref 4.5–5.9)
SODIUM SERPL-SCNC: 136 MMOL/L (ref 136–145)
T AXIS: 15 DEGREES
T OFFSET: 430 MS
VENTRICULAR RATE: 69 BPM
WBC # BLD AUTO: 4 X10*3/UL (ref 4.4–11.3)

## 2025-02-05 PROCEDURE — 80069 RENAL FUNCTION PANEL: CPT

## 2025-02-05 PROCEDURE — 2500000005 HC RX 250 GENERAL PHARMACY W/O HCPCS

## 2025-02-05 PROCEDURE — 2500000004 HC RX 250 GENERAL PHARMACY W/ HCPCS (ALT 636 FOR OP/ED)

## 2025-02-05 PROCEDURE — 82947 ASSAY GLUCOSE BLOOD QUANT: CPT

## 2025-02-05 PROCEDURE — 83735 ASSAY OF MAGNESIUM: CPT

## 2025-02-05 PROCEDURE — 2500000001 HC RX 250 WO HCPCS SELF ADMINISTERED DRUGS (ALT 637 FOR MEDICARE OP)

## 2025-02-05 PROCEDURE — 2500000002 HC RX 250 W HCPCS SELF ADMINISTERED DRUGS (ALT 637 FOR MEDICARE OP, ALT 636 FOR OP/ED)

## 2025-02-05 PROCEDURE — 2500000004 HC RX 250 GENERAL PHARMACY W/ HCPCS (ALT 636 FOR OP/ED): Performed by: INTERNAL MEDICINE

## 2025-02-05 PROCEDURE — 36415 COLL VENOUS BLD VENIPUNCTURE: CPT

## 2025-02-05 PROCEDURE — 85025 COMPLETE CBC W/AUTO DIFF WBC: CPT

## 2025-02-05 PROCEDURE — RXMED WILLOW AMBULATORY MEDICATION CHARGE

## 2025-02-05 PROCEDURE — 99239 HOSP IP/OBS DSCHRG MGMT >30: CPT

## 2025-02-05 RX ORDER — SEVELAMER CARBONATE 800 MG/1
800 TABLET, FILM COATED ORAL
Qty: 90 TABLET | Refills: 0 | Status: SHIPPED | OUTPATIENT
Start: 2025-02-05

## 2025-02-05 RX ORDER — PREDNISONE 5 MG/1
5 TABLET ORAL DAILY
Start: 2025-02-05 | End: 2025-02-10

## 2025-02-05 RX ORDER — VANCOMYCIN HCL 50 MG/ML
125 SOLUTION, RECONSTITUTED, ORAL ORAL EVERY 6 HOURS
Qty: 150 ML | Refills: 0 | Status: SHIPPED | OUTPATIENT
Start: 2025-02-05 | End: 2025-02-20

## 2025-02-05 RX ORDER — BENZONATATE 100 MG/1
100 CAPSULE ORAL 3 TIMES DAILY PRN
Qty: 20 CAPSULE | Refills: 0 | Status: CANCELLED | OUTPATIENT
Start: 2025-02-05

## 2025-02-05 RX ORDER — INSULIN GLARGINE 100 [IU]/ML
18 INJECTION, SOLUTION SUBCUTANEOUS EVERY MORNING
Start: 2025-02-05 | End: 2025-03-07

## 2025-02-05 RX ADMIN — INSULIN GLARGINE 18 UNITS: 100 INJECTION, SOLUTION SUBCUTANEOUS at 08:38

## 2025-02-05 RX ADMIN — CINACALCET HYDROCHLORIDE 30 MG: 30 TABLET, FILM COATED ORAL at 08:31

## 2025-02-05 RX ADMIN — KETOROLAC TROMETHAMINE 1 DROP: 5 SOLUTION OPHTHALMIC at 05:00

## 2025-02-05 RX ADMIN — NEOMYCIN SULFATE, POLYMYXIN B SULFATE AND DEXAMETHASONE 1 DROP: 3.5; 10000; 1 SUSPENSION OPHTHALMIC at 12:29

## 2025-02-05 RX ADMIN — IMIQUIMOD 1 PACKET: 12.5 CREAM TOPICAL at 08:31

## 2025-02-05 RX ADMIN — PREDNISONE 5 MG: 5 TABLET ORAL at 08:30

## 2025-02-05 RX ADMIN — NIFEDIPINE 90 MG: 90 TABLET, FILM COATED, EXTENDED RELEASE ORAL at 08:37

## 2025-02-05 RX ADMIN — SEVELAMER CARBONATE 800 MG: 800 TABLET, FILM COATED ORAL at 12:28

## 2025-02-05 RX ADMIN — KETOROLAC TROMETHAMINE 1 DROP: 5 SOLUTION OPHTHALMIC at 12:29

## 2025-02-05 RX ADMIN — INSULIN LISPRO 2 UNITS: 100 INJECTION, SOLUTION INTRAVENOUS; SUBCUTANEOUS at 12:29

## 2025-02-05 RX ADMIN — RENO CAPS 1 CAPSULE: 100; 1.5; 1.7; 20; 10; 1; 150; 5; 6 CAPSULE ORAL at 08:31

## 2025-02-05 RX ADMIN — VANCOMYCIN HYDROCHLORIDE 125 MG: KIT at 00:28

## 2025-02-05 RX ADMIN — TACROLIMUS 0.5 MG: 0.5 CAPSULE ORAL at 08:30

## 2025-02-05 RX ADMIN — CARVEDILOL 37.5 MG: 12.5 TABLET, FILM COATED ORAL at 08:37

## 2025-02-05 RX ADMIN — ISOSORBIDE MONONITRATE 60 MG: 30 TABLET, EXTENDED RELEASE ORAL at 08:37

## 2025-02-05 RX ADMIN — NEOMYCIN SULFATE, POLYMYXIN B SULFATE AND DEXAMETHASONE 1 DROP: 3.5; 10000; 1 SUSPENSION OPHTHALMIC at 05:00

## 2025-02-05 RX ADMIN — VANCOMYCIN HYDROCHLORIDE 125 MG: KIT at 06:09

## 2025-02-05 RX ADMIN — SEVELAMER CARBONATE 800 MG: 800 TABLET, FILM COATED ORAL at 06:09

## 2025-02-05 RX ADMIN — BRIMONIDINE TARTRATE 1 DROP: 2 SOLUTION/ DROPS OPHTHALMIC at 08:41

## 2025-02-05 RX ADMIN — INSULIN LISPRO 2 UNITS: 100 INJECTION, SOLUTION INTRAVENOUS; SUBCUTANEOUS at 08:38

## 2025-02-05 RX ADMIN — PANTOPRAZOLE SODIUM 40 MG: 40 TABLET, DELAYED RELEASE ORAL at 06:09

## 2025-02-05 RX ADMIN — VANCOMYCIN HYDROCHLORIDE 125 MG: KIT at 12:28

## 2025-02-05 ASSESSMENT — COGNITIVE AND FUNCTIONAL STATUS - GENERAL
MOBILITY SCORE: 24
DAILY ACTIVITIY SCORE: 24

## 2025-02-05 ASSESSMENT — PAIN SCALES - GENERAL: PAINLEVEL_OUTOF10: 0 - NO PAIN

## 2025-02-05 ASSESSMENT — PAIN - FUNCTIONAL ASSESSMENT: PAIN_FUNCTIONAL_ASSESSMENT: 0-10

## 2025-02-05 NOTE — TELEPHONE ENCOUNTER
Patient scheduled for nephrology as requested by coordinator.   Location, date, and instructions given to patient.   Advised patient that if any changes to appointment are needed, they must call the office so we are aware.   Date: 2/24/2025 Status: [facility]  Time: 9:30 AM    Arrive By:        Length: 30    Visit Type: TXP KIDNEY SURGEON [4963] Copay: $0.00  Provider: TX ABDOMINAL SURGEON Department: [address]      Department Address: [address] [place] [address]  CSN: [id number]    Bill Area:     Referral Number:        Referring Provider:   Referral Status:    THUAN: KELVIN ROB [[id number]]      Notes: 2 week f/u after discharge dbl per vera  Made On: 2/5/2025 2:47 PM By: DAI BOYER [[id number]] (ES)    Date: 2/26/2025 Status: [facility]  Time: 10:00 AM    Arrive By:        Length: 20    Visit Type: TXP KIDNEY PHYSICIAN [4950] Copay: $0.00  Provider: TXP KIDNEY PHYSICIAN Department: [address]      Department Address: [address] [place] [address]  CSN: [id number]    Bill Area:     Referral Number:        Referring Provider:   Referral Status:    THUAN: KELVIN ROB [[id number]]      Notes: 2 week f/u discharge dbl per vera  Made On: 2/5/2025 2:45 PM By: DAI BOYER [[id number]] (ES)    No Further Action Required

## 2025-02-05 NOTE — CARE PLAN
Problem: Diabetes  Goal: Achieve decreasing blood glucose levels by end of shift  Outcome: Progressing  Goal: Increase stability of blood glucose readings by end of shift  Outcome: Progressing     Problem: Pain - Adult  Goal: Verbalizes/displays adequate comfort level or baseline comfort level  Outcome: Progressing     Problem: Safety - Adult  Goal: Free from fall injury  Outcome: Progressing     Problem: Discharge Planning  Goal: Discharge to home or other facility with appropriate resources  Outcome: Progressing     Problem: Chronic Conditions and Co-morbidities  Goal: Patient's chronic conditions and co-morbidity symptoms are monitored and maintained or improved  Outcome: Progressing     Problem: Nutrition  Goal: Nutrient intake appropriate for maintaining nutritional needs  Outcome: Progressing   The patient's goals for the shift include pt. will have adequate IV access.    The clinical goals for the shift include pt will remain comfortable through shift

## 2025-02-05 NOTE — CARE PLAN
Problem: Diabetes  Goal: Achieve decreasing blood glucose levels by end of shift  Outcome: Progressing     Problem: Pain - Adult  Goal: Verbalizes/displays adequate comfort level or baseline comfort level  Outcome: Progressing     Problem: Safety - Adult  Goal: Free from fall injury  Outcome: Progressing     Problem: Discharge Planning  Goal: Discharge to home or other facility with appropriate resources  Outcome: Progressing     Problem: Chronic Conditions and Co-morbidities  Goal: Patient's chronic conditions and co-morbidity symptoms are monitored and maintained or improved  Outcome: Progressing   The patient's goals for the shift include pt. will have adequate IV access.    The clinical goals for the shift include pts blood glucose will be wdl through shift

## 2025-02-05 NOTE — DISCHARGE INSTRUCTIONS
Dear Manolo Bashir,    You were admitted to [facility] from 1/26/25 to 2/5/25 for shortness of breath, nausea/vomiting/diarrhea and generalized malaise. You were found to have an overload of fluid on your lungs based on imaging and lab results. There was no concern for cardiac abnormalities. The transplant nephrology team recommended additional dialysis to help remove the fluids from your lungs. You were also found to have a bacterial infection called C. Diff in your bowels which was causing your diarrhea. This was treated with Vancomycin and you will be sent home with additional doses to complete the treatment. CT Imaging did show increased swelling of your transplanted kidneys which was concerning for transplant rejection. The transplant team agreed on removal of the affected kidney outpatient.    Medication changes:  Start these medications:   Sevelamer Carbonate 800mg (3 times a day)  Vancomycin 2.5mL by mouth every 6 hours for 8 doses until Friday  Stop these medications:   Bactrim DS   Change how you are taking these medications:   Insulin Glargine (Lantus) Inject 18 Units once daily in the morning  Prednisone 5mg    Appointments/follow-up:  Transplant Nephrology with Dr. Valdes  Transplant Surgery w/ Dr. South  If you do not receive calls for scheduling these appointments, please call [phone] to confirm scheduling.    It was a pleasure taking care of you and we wish you all the best with your recovery,    Your  Care Team

## 2025-02-05 NOTE — DISCHARGE SUMMARY
"Discharge Diagnosis  Shortness of breath    Issues Requiring Follow-Up  Transplant Nephrology with Dr. Arroyo  Transplant Surgery w/ Dr. South    Discharge Meds     Medication List      START taking these medications     sevelamer carbonate 800 mg tablet; Commonly known as: Renvela; Take 1   tablet (800 mg) by mouth 3 times a day before meals. Swallow tablet whole;   do not crush, break, or chew.   vancomycin 50 mg/mL oral solution; Commonly known as: Firvanq; Take 2.5   mL (125 mg) by mouth every 6 hours for 8 doses. Discard remainder     CHANGE how you take these medications     insulin glargine 100 unit/mL (3 mL) pen; Commonly known as: Lantus;   Inject 18 Units under the skin once daily in the morning. Inject 20 units   daily as directed; What changed: how much to take   predniSONE 5 mg tablet; Commonly known as: Deltasone; Take 1 tablet (5   mg) by mouth once daily for 5 days.; What changed: See the new   instructions.     CONTINUE taking these medications     acetaminophen 325 mg tablet; Commonly known as: Tylenol; Take 3 tablets   (975 mg) by mouth every 6 hours if needed (pain).   BD Luer-Rita Syringe 3 mL 25 x 5/8\" syringe; Generic drug: syringe with   needle; Use to inject epogen.   calcitriol 0.5 mcg capsule; Commonly known as: Rocaltrol; Take 1 capsule   (0.5 mcg) by mouth once daily.   carvedilol 12.5 mg tablet; Commonly known as: Coreg; Take 3 tablets   (37.5 mg) by mouth 2 times a day. PATIENT NEEDS TO FOLLOW UP WITH   CARDIOLOGY   cinacalcet 30 mg tablet; Commonly known as: Sensipar; Take 1 tablet (30   mg) by mouth once daily.   Easy Touch Alcohol Prep Pads pads, medicated; Generic drug: alcohol   swabs   Gvoke HypoPen 1-Pack 1 mg/0.2 mL auto-injector; Generic drug: glucagon;   Inject 1 mg into the muscle every 15 minutes if needed (For blood glucose   41 to 70 mg/dL and no IV access).   imiquimod 5 % cream; Commonly known as: Aldara; Apply 1 packet topically   3 times a week.   insulin lispro 100 " "unit/mL injection; Commonly known as: HumaLOG KwikPen   Insulin; Inject 3 Units under the skin 3 times daily (morning, midday,   late afternoon). 2 units with meals plus sliding scale up to 30 units   daily   isosorbide mononitrate ER 60 mg 24 hr tablet; Commonly known as: Imdur;   Take 1 tablet (60 mg) by mouth once daily.   ketorolac 0.5 % ophthalmic solution; Commonly known as: Acular; Instill   1 drop into right eye three times a day FOR USE AFTER CATARACT SURGERY   neomycin-polymyxin-dexAMETHasone 3.5mg/mL-10,000 unit/mL-0.1 %   ophthalmic suspension; Commonly known as: Maxitrol; Instill 1 drop into   right eye three times a day FOR USE AFTER CATARACT SURGERY   NIFEdipine ER 90 mg 24 hr tablet; Commonly known as: Adalat CC; Take 1   tablet (90 mg) by mouth 2 times a day. Do not crush, chew, or split.   pantoprazole 40 mg EC tablet; Commonly known as: ProtoNix; Take 1 tablet   (40 mg) by mouth once daily in the morning. Take before meals. Do not   crush, chew, or split. Do not start before January 22, 2024.   pravastatin 10 mg tablet; Commonly known as: Pravachol; Take 1 tablet   (10 mg) by mouth once daily at bedtime.   Renal Caps 1 mg capsule; Generic drug: vitamin B complex-vitamin C-folic   acid; Take 1 capsule by mouth once daily.   * tacrolimus 0.1 % ointment; Commonly known as: Protopic; Apply   topically 2 times a day.   * tacrolimus 0.5 mg capsule; Commonly known as: Prograf; Take 1 capsule   (0.5 mg) by mouth 2 times a day.   TRUEdraw Lancing Device misc; Generic drug: lancing device; use as   directed   TRUEplus Pen Needle 32 gauge x 5/32\" needle; Generic drug: pen needle,   diabetic; Use 4 a day as directed   * Unilet Super Thin Lancets 30 gauge misc; Generic drug: lancets; USE TO   TEST BLOOD SUGAR THREE TIMES A DAY   * Unilet Super Thin Lancets 30 gauge misc; Generic drug: lancets; 1 each   3 times a day.  * This list has 4 medication(s) that are the same as other medications   prescribed for you. " Read the directions carefully, and ask your doctor or   other care provider to review them with you.     STOP taking these medications     sulfamethoxazole-trimethoprim 800-160 mg tablet; Commonly known as:   Bactrim DS     ASK your doctor about these medications     Epogen 10,000 unit/mL injection; Generic drug: epoetin aida; Inject 1 mL   (10,000 Units) under the skin every 7 days. Do not start before April 17, 2024.       Test Results Pending At Discharge  Pending Labs       Order Current Status    Ova and Parasite Examination In process    Ova/Para + Giardia/Cryptosporidium Antigen In process            Hospital Course  Manolo Bashir is a 69 y.o. male with a PMH of ESRD (on dialysis, TThSa schedule, most recent dialysis 1/25), c/b impaired allograft function currently on immunosuppression (prednisone, tacrolimus), IgG and IgA lambda MGUS (BM biopsy 10/23 without evidence of myeloma), T2DM (on 20 units Lantus in AM, Lispro 3 units TID + SSI), HTN, prostate cancer (s/p radical prostatectomy 10/2013), HCV (s/p treatment, PCR negative 10/2023), and gastroparesis who presented to the ED on 1/26 for 1 day history of SOB, nausea/vomiting, diarrhea, and general malaise that started during dialysis on 1/25. The patient reported that he had not missed any doses of medication and had not missed any dialysis sessions.     Patient was febrile to 38.4 on presentation and hypoxic to 88% on RA. He was placed on supplemental oxygen via nasal cannula on arrival. ED work up was remarkable for elevated troponins (65 -> 87), elevated BNP (2,810), hyperphosphatemia to 5.0, and thrombocytopenia to 57. Imaging was remarkable for bilateral pulmonary vascular congestion consistent with pulmonary edema, no overt evidence of consolidation however it was difficult to assess in the context of edema. ECG unchanged from prior studies. Given the patient's history of ESRD, elevated BNP, and evidence of pulmonary edema, respiratory symptoms  were potentially 2/2 fluid overload from ESRD. However, patient had not missed any dialysis appointments recently and did not have LE edema, so other etiologies were considered. Patient does not have history of heart failure, however does have a history of valvular dysfunction (mild-mod aortic valve regurgitation), LV dilation and hypertrophy, and LA/RA dilation. Given this, systolic murmur findings, and pulmonary edema with elevated BNP/troponins, cardiac etiologies could not be ruled out. Additionally, the patient did present with fever, chills, and nausea/vomiting/diarrhea, concerning for potential infectious etiology, likely gastroenteritis vs. Pneumonia, especially given increased risk for infection in dialysis patients.    The patient was seen by transplant nephrology who recommended an additional session of dialysis when available due to concern for fluid overload. Viral URI panel was negative and stool studies were ordered. His immunosuppressant agents were continued and pneumonia coverage was started with vanc/zosyn/azithromycin due to immunocompromised status. A TTE was ordered to rule out cardiac etiologies of pulmonary edema/elevated BNP which showed no significant changes from his prior echos.    On 1/27 his symptoms had significantly improved and he reported reduction in his cough, SOB, nausea, vomiting, and diarrhea. His crackles on lung exam were improved and he still did not have lower extremity edema. He went for dialysis in the afternoon without complication. His URI viral panel and MRSA nares were negative so his vancomycin was discontinued. He was then found to have positive C. Diff PCR so oral vancomycin was started for 10 days total. After 2 days on IV antibiotics for pneumonia treatment he was transitioned to oral levofloxacin.    On 1/30 the patient developed increased cough, SOB, and abdominal pain associated with hypertension to the 200s, CT A/P was obtained which was concerning for edema  of the transplanted kidney. Transplant surgery was consulted for potential nephrectomy. ID was also consulted due to concern for pneumonia/colitis in an immunocompromised patient, however they were less concerned for pneumonia given improvement in his CXR so pneumonia coverage was discontinued. Transplant nephrology also recommended a prednisone taper which was initiated on 1/30. He continued to be hypertensive in the AM on 1/31 so his nifedipine was increased to 90 mg BID with improvement in his BP control. He was also hyperkalemic to 5.8 on 1/31 so he received hyperk protocol with lokelma and calcium gluconate with improvement.    After initiation of the prednisone taper, the patient's abdominal pain significantly improved. His diarrhea also improved with oral vancomycin and his respiratory symptoms continued to improve. Per transplant surgery, the patient was okay for discharge on 2/3 with plans to follow up outpatient for nephrectomy. His insulin regimen was adjusted to account for prednisone administration daily.    Pertinent Physical Exam At Time of Discharge  Constitutional: Patient does not appear to be in any acute distress, AOx4  Cardio: RRR, S1/S2, no murmurs, rubs, or gallops  Pulm: CTAB, no respiratory distress.  GI: +BS, soft, non-tender, nondistended, no guarding or rebound, no masses noted  MSK: No joint swelling, normal movements of all extremities  Skin: No lesions, contusions, or erythema.  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

## 2025-02-05 NOTE — NURSING NOTE
Pt dc order in, iv removed, meds to beds and dc instructions at bedside, transport called to marilyn rocha.

## 2025-02-06 LAB — O+P STL MICRO: NEGATIVE

## 2025-02-07 PROCEDURE — RXMED WILLOW AMBULATORY MEDICATION CHARGE

## 2025-02-10 ENCOUNTER — PHARMACY VISIT (OUTPATIENT)
Dept: PHARMACY | Facility: CLINIC | Age: 70
End: 2025-02-10
Payer: COMMERCIAL

## 2025-02-10 PROCEDURE — RXMED WILLOW AMBULATORY MEDICATION CHARGE

## 2025-02-11 PROCEDURE — RXMED WILLOW AMBULATORY MEDICATION CHARGE

## 2025-02-12 PROCEDURE — RXMED WILLOW AMBULATORY MEDICATION CHARGE

## 2025-02-12 NOTE — DOCUMENTATION CLARIFICATION NOTE
"    PATIENT:               KELVIN ROB  ACCT #:                  [id number]  MRN:                       [id number]  :                       1955  ADMIT DATE:       2025 5:30 AM  DISCH DATE:        2025 3:11 PM  RESPONDING PROVIDER #:        [id number]          PROVIDER RESPONSE TEXT:    Chronic HFpEF present    CDI QUERY TEXT:    Clarification    Instruction:    Based on your assessment of the patient and the clinical information, please provide the requested documentation by clicking on the appropriate radio button and enter any additional information if prompted.    Question: Based on your medical judgment, can you please clarify which of these conditions is the most clinically supported    When answering this query, please exercise your independent professional judgment. The fact that a question is being asked, does not imply that any particular answer is desired or expected.    The patient's clinical indicators include:  Clinical Information:  HP: PMH s/f ESRD on HD 3x per week, reports no missed meds or HD; s/p renal transplants c/b chronic rejection, HTN.  Last HD on .   presented w/SOB.    There is conflicting documentation in the medical record which requires clarification.    -\"HFpEF\" documented for RCRI on  IM PN by HAO Cheung MS4, co-sign by Dr. KRISTA Ojeda    -\"Patient does not have history of heart failure\" documented on  DCS by Dr. LIAM King    Clinical Indicators:  BNP 2810   CXR \"Mild central pulmonary vascular congestion with patchy perihilar/bibasilar airspace opacities\", \"Small bilateral pleural effusions\"     TTE indications ?h/o heart disase?, ?Dx: Chronic heart failure with preserved ejection fraction?  Interpretation:  \"trace aortic valve regurgitation\", \"mild mitral valve regurgitation\", \"trace tricuspid regurgitation\", \"mild pulmonic valve regurgitation\"  Conclusions:  ?1. The left ventricular systolic function is normal, with a visually estimated " "ejection fraction of 55%\"  \"2. Spectral Doppler shows a Grade III (restrictive pattern) of left ventricular diastolic filling with an elevated left atrial pressure?  ?7. Compared with study dated 4/5/2024, No significant change on side by side comparison.?    1/31 IM PN: \"concerning for fluid overload 2/2 ESRD, however cannot rule out cardiac etiologies of pulmonary edema\"    2/5 DCS \"does not have history of heart failure\", does have hx of \"mild-mod AV regurgitation.  Transplant Nephro \"recommended an additional session of dialysis\"  1/27 \"reduction in his cough, SOB\", \"crackles on lung exam were improved\", \"still did not have lower extremity edema\",  1/30 \"developed increased cough, SOB, and abdominal pain associated with hypertension to the 200s\"  \"continued to be hypertensive in the AM on 1/31\", \"nifedipine was increased to 90 mg BID with improvement in his BP control\"    Treatment: Transplant consult 1/26. HD 1/27, 1/30, 2/1, 2/4. TTE 1/28.    Risk Factors: ESRD. Pulm edema. Valve dysfunction. HTN  Options provided:  -- HFpEF ruled out  -- Chronic HFpEF present  -- Acute on chronic HFpEF present  -- Other - I will add my own diagnosis  -- Refer to Clinical Documentation Reviewer    Query created by: Evie Goss on 2/10/2025 3:37 PM      Electronically signed by:  ALDA MCCORMACK MD PhD 2/12/2025 6:26 PM          "

## 2025-02-12 NOTE — PROGRESS NOTES
Chief Complaint: Patient presents for kidney transplant evaluation    History of Present Illness:  Manolo Bashir  is a 69 y.o. male presents with ESRD from HTN.    History of end-stage renal disease secondary to hypertension on dialysis initially starting 1988.  Underwent kidney transplant 1992 that failed in 2006.  This was initially placed on the right side and then removed for infection.    Then had a kidney transplant in 2013 which failed earlier this year in 2024.  This kidney transplant was complicated by bowel injury requiring ex lap, ostomy.  He then had ostomy reversal.  This kidney remains in place on the left side.    During this kidneys failure he was treated with 2 doses of rituximab in January 2024.  On dialysis since 5/2024.    On tacrolimus 1 mg twice daily and 5 mg of prednisone.  Was on azathioprine prior due to intolerance of MMF.  States may have been secondary to diarrhea but unsure.    He appears functional in clinic today.  Able to walk in and out without issues.    Of note he does have a history of prostate cancer and radical prostatectomy in 2012.  Did have urinary incontinence following prostatectomy and it sounds like he had artificial sphincter placed and removed for infection.  Currently he is incontinent but having minimal urine output.    Hemodialysis: HD sine 5/2024   ROS: oliguric, no urinary retention/hematuria/recurrent UTIs/nephrolithiasis.   Disease Etiology:  hypertensive nephropathy.   Disease Complications:  dyslipidemia and hypertension.   Prior Malignancy: Prostatectomy    Past Medical History:  Hypertension  Kidney transplant x 2  End-stage renal disease  MGUS  HCV status posttreatment 2023  Prostate cancer status post prostatectomy  Urinary incontinence  Chronic diarrhea on loperamide following hemicolectomy  Gastroparesis on Reglan    Past Surgical History:  DDKT right iliac fossa 1992  Transplant nephrectomy of right kidney  DDKT left iliac fossa 2013  -still has  Ex lap, bowel resection and ileostomy 2013  Ileostomy takedown  Prostatectomy radical 2012  Artificial urinary sphincter 2023 -removed 1 month later  Laparoscopic cholecystectomy    Social History:  Denies smoking drinking or alcohol  Review of Systems:  Cardiac: Denies chest pain, palpitations  : Minimal urine output. Denies history of gross hematuria, nephrolithiasis, urinary retention, or recurrent UTIs.  Does have urinary incontinence  Vascular: Denies personal or familial history of DVT/PE. No active claudication or non-healing LE wounds.  Functional Status: Can walk up 2 flights of stairs  Prior transplant: Not applicable    Physical Exam:  Gen: A+OX3; NAD  HEENT: PERRL, sclera anicteric, MMM  Cardiac: RRR  Chest: Normal inspiratory effort  Abdomen: S/NT/ND.  Right lateral Gibsons surgical scars.  Midline surgical scar.  Right mid abdomen ileostomy takedown site.  Lap scars from gallbladder  Ext: No LE edema  Vascular: 2+ palpable femoral pulses  Psychiatric: Normal mood, affect    Assessment/Plan:  - The patient is a possible candidate for 3rd kidney retransplantation.    He has CT imaging that shows good vessels for implantation.  Has had prior right transplant nephrectomy.  The upper vessels on the right side appear open and should be good for implantation.  Of note he has had an ex lap and ileostomy takedown and what sounds like possible hemicolectomy following his left side kidney transplant.  Expect he will have some adhesions that will be encountered at time of surgery.    Other concern is he has urinary incontinence following prostatectomy.  He had an artificial sphincter placed in 2023 which was then removed for infection.  Discussed that although incontinence is not prohibitive for retransplant, giving him a new kidney will make his incontinence symptoms worse.  Would encourage him to see his urologist for further discussion on incontinence management.    - Routine age/gender based  screening  - Cardiac testing per protocol: ECHO/stress test/cardiac calcium score  - Urology to evaluate his urinary incontinence. Distant prostate ca  - Need records for US    Transplant Education:    I had a discussion with this patient regarding 1 year graft and patient survival statistics following renal transplantation for both living and  donor allograft recipients. This data included [facility] data compared to National data readily available for review on https://www.SRTR.org. The patient also had attended the kidney transplant education class provided by the transplant institute.     The difference between allograft function was discussed comparing living donor, KDPI 0-85%, and >85% kidneys.     Further discussion included:  -The transplant selection committee process.  -The need for lifelong immunosuppressive therapy, and the side effects of these medications including the risk of infections, cancer, and lymphoma.  -The wait list time approximately is 5 years or more for  donor transplants and the statistical superiority of a living donor.     -Using identified donors with risk criteria for transmission of infection  -The possibility of utilizing  donors with known HCV antibody and/or KATRIN positivity and post-transplant treatment/surveillance protocol  -Potential transmission of infectious disease from any  donors, as well as living donors.   -The possibility of transmission of tumors and infections via the transplanted organ.  -The inability to completely test for all potential harmful tumors or infectious agents.  -The possibility of listing at multiple locations.     Surgical complications including need for reoperation(s) including but not limited to:  -Bleeding.  -Repair of leaks.  -Control of infection.  -Blood clots in the transplant vessels.  -Possible kidney transplant removal.     The medical complications including but not limited  to:  -Death.  -Cardiac.  -Pulmonary.  -Infectious.  -Neurologic.  -Other Complications.     We also discussed how the kidney transplant could function:  -Non-function and possible kidney transplant removal within the first 3 to 6 months.  -Delayed graft function (dialysis needed after transplant).  -The potential of recurrence of kidney disease leading to kidney transplant graft loss.    Time Attestation:  I spent 60 minutes with the patient, over 50 minutes in counseling and education as outlined above.    Sofia Lara MD   \"ALKPHOS\" in the last 72 hours.    Invalid input(s): \"ALB\"  Lipids:   Lab Results   Component Value Date/Time    CHOL 203 02/07/2025 04:45 AM    HDL 37 02/07/2025 04:45 AM    TRIG 82 02/07/2025 04:45 AM     Hemoglobin A1C:   Lab Results   Component Value Date/Time    LABA1C 5.6 02/05/2025 07:37 AM     TSH:   Lab Results   Component Value Date/Time    TSH 4.74 02/05/2025 07:37 AM     Troponin: No results found for: \"TROPONINT\"  Lactic Acid: No results for input(s): \"LACTA\" in the last 72 hours.  BNP:   Recent Labs     02/10/25  0425 02/12/25  0347   PROBNP 3,001* 2,818*     UA:  Lab Results   Component Value Date/Time    NITRU Negative 02/09/2025 12:30 PM    COLORU DARK YELLOW 02/09/2025 12:30 PM    PHUR 6.5 02/09/2025 12:30 PM    WBCUA 229 02/09/2025 12:30 PM    RBCUA 34 02/09/2025 12:30 PM    BACTERIA None Seen 02/09/2025 12:30 PM    CLARITYU CLOUDY 02/09/2025 12:30 PM    LEUKOCYTESUR MODERATE 02/09/2025 12:30 PM    UROBILINOGEN 1.0 02/09/2025 12:30 PM    BILIRUBINUR Negative 02/09/2025 12:30 PM    BLOODU MODERATE 02/09/2025 12:30 PM    GLUCOSEU >=1000 02/09/2025 12:30 PM    KETUA Negative 02/09/2025 12:30 PM     Urine Cultures:   Lab Results   Component Value Date/Time    LABURIN No growth at 18 to 36 hours 02/09/2025 12:30 PM     Blood Cultures: No results found for: \"BC\"  No results found for: \"BLOODCULT2\"  Organism:   Lab Results   Component Value Date/Time    ORG Aerococcus species 01/13/2025 08:20 PM       Total time Spent co-ordinating discharge for the patient:  > 35 minutes    Electronically signed by Stefano Doty MD on 2/12/2025 at 2:58 PM

## 2025-02-14 ENCOUNTER — PHARMACY VISIT (OUTPATIENT)
Dept: PHARMACY | Facility: CLINIC | Age: 70
End: 2025-02-14
Payer: COMMERCIAL

## 2025-02-21 PROCEDURE — RXMED WILLOW AMBULATORY MEDICATION CHARGE

## 2025-02-24 ENCOUNTER — TELEPHONE (OUTPATIENT)
Facility: HOSPITAL | Age: 70
End: 2025-02-24

## 2025-02-24 ENCOUNTER — HOSPITAL ENCOUNTER (OUTPATIENT)
Dept: RADIOLOGY | Facility: HOSPITAL | Age: 70
Discharge: HOME | End: 2025-02-24
Payer: MEDICARE

## 2025-02-24 ENCOUNTER — OFFICE VISIT (OUTPATIENT)
Facility: HOSPITAL | Age: 70
End: 2025-02-24
Payer: MEDICARE

## 2025-02-24 ENCOUNTER — PHARMACY VISIT (OUTPATIENT)
Dept: PHARMACY | Facility: CLINIC | Age: 70
End: 2025-02-24
Payer: COMMERCIAL

## 2025-02-24 ENCOUNTER — APPOINTMENT (OUTPATIENT)
Dept: RADIOLOGY | Facility: HOSPITAL | Age: 70
DRG: 562 | End: 2025-02-24
Payer: MEDICARE

## 2025-02-24 VITALS
OXYGEN SATURATION: 98 % | WEIGHT: 154.6 LBS | TEMPERATURE: 99.6 F | SYSTOLIC BLOOD PRESSURE: 156 MMHG | HEART RATE: 68 BPM | WEIGHT: 154.6 LBS | TEMPERATURE: 99.6 F | HEART RATE: 68 BPM | BODY MASS INDEX: 23.51 KG/M2 | DIASTOLIC BLOOD PRESSURE: 86 MMHG | OXYGEN SATURATION: 98 % | DIASTOLIC BLOOD PRESSURE: 86 MMHG | BODY MASS INDEX: 23.51 KG/M2 | SYSTOLIC BLOOD PRESSURE: 156 MMHG

## 2025-02-24 DIAGNOSIS — J15.9 BACTERIAL PNEUMONIA: ICD-10-CM

## 2025-02-24 DIAGNOSIS — B77.81: ICD-10-CM

## 2025-02-24 DIAGNOSIS — Z92.25 PERSONAL HISTORY OF IMMUNOSUPRESSION THERAPY: ICD-10-CM

## 2025-02-24 DIAGNOSIS — N18.6 ESRD (END STAGE RENAL DISEASE) (MULTI): ICD-10-CM

## 2025-02-24 DIAGNOSIS — J98.4 PNEUMONITIS: Primary | ICD-10-CM

## 2025-02-24 PROCEDURE — 99214 OFFICE O/P EST MOD 30 MIN: CPT | Performed by: STUDENT IN AN ORGANIZED HEALTH CARE EDUCATION/TRAINING PROGRAM

## 2025-02-24 PROCEDURE — G2211 COMPLEX E/M VISIT ADD ON: HCPCS | Performed by: STUDENT IN AN ORGANIZED HEALTH CARE EDUCATION/TRAINING PROGRAM

## 2025-02-24 PROCEDURE — 1125F AMNT PAIN NOTED PAIN PRSNT: CPT | Performed by: SURGERY

## 2025-02-24 PROCEDURE — 99214 OFFICE O/P EST MOD 30 MIN: CPT | Performed by: SURGERY

## 2025-02-24 PROCEDURE — 99214 OFFICE O/P EST MOD 30 MIN: CPT | Mod: 24 | Performed by: SURGERY

## 2025-02-24 PROCEDURE — 74176 CT ABD & PELVIS W/O CONTRAST: CPT

## 2025-02-24 PROCEDURE — 1111F DSCHRG MED/CURRENT MED MERGE: CPT | Performed by: SURGERY

## 2025-02-24 PROCEDURE — 3079F DIAST BP 80-89 MM HG: CPT | Performed by: SURGERY

## 2025-02-24 PROCEDURE — 1111F DSCHRG MED/CURRENT MED MERGE: CPT | Performed by: STUDENT IN AN ORGANIZED HEALTH CARE EDUCATION/TRAINING PROGRAM

## 2025-02-24 PROCEDURE — 3077F SYST BP >= 140 MM HG: CPT | Performed by: SURGERY

## 2025-02-24 PROCEDURE — 99214 OFFICE O/P EST MOD 30 MIN: CPT | Mod: 25 | Performed by: STUDENT IN AN ORGANIZED HEALTH CARE EDUCATION/TRAINING PROGRAM

## 2025-02-24 PROCEDURE — RXMED WILLOW AMBULATORY MEDICATION CHARGE

## 2025-02-24 PROCEDURE — 1125F AMNT PAIN NOTED PAIN PRSNT: CPT | Performed by: STUDENT IN AN ORGANIZED HEALTH CARE EDUCATION/TRAINING PROGRAM

## 2025-02-24 PROCEDURE — 3077F SYST BP >= 140 MM HG: CPT | Performed by: STUDENT IN AN ORGANIZED HEALTH CARE EDUCATION/TRAINING PROGRAM

## 2025-02-24 PROCEDURE — 3079F DIAST BP 80-89 MM HG: CPT | Performed by: STUDENT IN AN ORGANIZED HEALTH CARE EDUCATION/TRAINING PROGRAM

## 2025-02-24 RX ORDER — DOXYCYCLINE 100 MG/1
100 TABLET ORAL 2 TIMES DAILY
Qty: 20 TABLET | Refills: 0 | Status: ON HOLD | OUTPATIENT
Start: 2025-02-24 | End: 2025-03-06

## 2025-02-24 RX ORDER — LEVOFLOXACIN 250 MG/1
250 TABLET ORAL DAILY
Qty: 5 TABLET | Refills: 0 | Status: ON HOLD | OUTPATIENT
Start: 2025-02-24 | End: 2025-03-01

## 2025-02-24 ASSESSMENT — PAIN SCALES - GENERAL
PAINLEVEL_OUTOF10: 7
PAINLEVEL_OUTOF10: 7

## 2025-02-24 NOTE — PROGRESS NOTES
Manolo Bashir is a 69 y.o. male recent admit for allograft pain/hematuria    Objective   Gen: A+OX3  HEENT: PERRL, sclera anicteric, MMM  Cardiac: RRR  Chest: Normal inspiratory effort +cough  Abdomen: Tender over left allograft  Ext: No LE edema    Last Recorded Vitals  Visit Vitals  /86 (BP Location: Right arm, Patient Position: Sitting, BP Cuff Size: Adult)   Pulse 68   Temp 37.6 °C (99.6 °F) (Temporal)      Assessment/Plan   Principal Problem:    Failed kidney allograft   Cough    Active Problems:  Patient Active Problem List   Diagnosis    Adenocarcinoma of prostate (Multi)    Anemia of chronic disease    Atrophy of urethral cuff associated with artificial urinary sphincter (CMS-HCC)    Chronic hepatitis C (Multi)    Diabetes mellitus type 2 without retinopathy (Multi)    Hyperlipidemia    GERD (gastroesophageal reflux disease)    Goiter    Tertiary hyperparathyroidism (Multi)    Immunosuppression    Kidney replaced by transplant ([facility])    Male erectile disorder of organic origin    Essential hypertension    Peripheral vascular disease (CMS-[facility])    Vitamin D deficiency    Aortic aneurysm (CMS-[facility])    Pancytopenia    MGUS (monoclonal gammopathy of unknown significance)    Pyelonephritis    Fluid overload, unspecified    Focal and segmental proliferative glomerulonephritis    Immunosuppressive management encounter following kidney transplant    Kidney transplanted ([facility])    Acute renal failure superimposed on chronic kidney disease (CMS-[facility])    Allergic conjunctivitis    Allergic reaction    Cellulitis    Cholecystitis    Chronic low back pain    Dehydration    Disease due to severe acute respiratory syndrome coronavirus 2 (SARS-CoV-2)    Generalized hyperhidrosis    History of colonic polyps    Immunodeficiency due to drugs (CODE)    Long term (current) use of calcineurin inhibitor    Methicillin resistant Staphylococcus aureus infection    Personal history of COVID-19    Pleural effusion     Rash    Residual hemorrhoidal skin tags    Radiculitis    ESRD (end stage renal disease) on dialysis (Multi)    Hypervolemia, unspecified hypervolemia type    Shortness of breath    Acute lower urinary tract infection    Blepharitis    Dyslipidemia    Symptoms involving urinary system    Pneumonia of both lungs due to infectious organism, unspecified part of lung    Pre-transplant evaluation for kidney transplant    Chronic heart failure with preserved ejection fraction    Type 2 diabetes mellitus with stage 4 chronic kidney disease, with long-term current use of insulin (Multi)    Chronic rejection of kidney transplant (HHS-HCC)      - CT chest  - Will need txp nephrectomy; timing TBD active infection  - Will not tolerate further steroids given recent infections  - Pred 5 mg/Tac 0.5 mg bid    I spent 30 minutes in the professional and overall care of this patient, including immunosuppression management.    Farzana George MD, PHD, MPH, FACS  Chief Transplant and Hepatobiliary Surgery

## 2025-02-24 NOTE — PROGRESS NOTES
TRANSPLANT NEPHROLOGY :   OUTPATIENT CLINIC NOTE      SERVICE DATE : 02/24/2025    REASON FOR VISIT/CHIEF COMPLAINT:  S/P  TRANSPLANT SURGERY  IMMUNOSUPPRESSIVE MEDICATION MANAGEMENT  BLOOD PRESSURE MANAGEMENT    HPI:    Mr. Bashir is a 69 y.o. male with past medical history significant for ESRD attributed to hypertension since 1998 s/p kidney transplant #1 3/26/1992 that failed 11/13/2006), s/p kidney transplant #2 7/13/2013 which failed in May 2024 following which he has been on HD (TTS, [facility] via L[facility] AVG) . He also has known MGUS with IgG and IgA lambda (bone marrow bx 10/2023 with no plasma cell dyscrasia), DM2, HTN, prostate cancer s/p radical prostatectomy, baseline urinary incontinence, HCV s/p treatment (recent HCV PCR negative 7/2024).      Back on HD. S/p high dose pulse steroid for graft intolerance.  Still notes tenderness at the renal allograft with palpitation but no pain with movement. No gross hematuria. He still makes a little bit of urine.  For the past one week he has had cough and dyspnea. No fever or chills.  Completed steroid. Only on tacrolimus.    ROS:  Review of  14 systems was performed system by system. See HPI. Otherwise, the symptoms were negative.    PAST MEDICAL HISTORY:  Past Medical History:   Diagnosis Date    Anemia     Arthritis     Cataract     Chronic kidney disease, stage 3 unspecified (Multi) 09/26/2018    Stage 3 chronic kidney disease    CKD (chronic kidney disease)     stage V    Cough 02/12/2024    COVID-19 06/18/2020    COVID-19 virus infection    Diabetes (Multi)     ESRD (end stage renal disease) (Multi)     Focal and segmental proliferative glomerulonephritis 12/23/2023    HTN (hypertension)     Hyperlipidemia     Other long term (current) drug therapy 07/20/2021    High risk medication use    Personal history of other diseases of the circulatory system     Personal history of cardiac murmur    Personal history of other infectious and parasitic diseases  08/17/2015    History of hepatitis    Polyp, colonic 08/17/2023    Primary osteoarthritis of both ankles 08/17/2023    Prostate cancer (Multi)     Tubular adenoma of colon 08/17/2023    Unspecified kidney failure 08/17/2016    Renal failure        PAST SURGICAL HISTORY:  Past Surgical History:   Procedure Laterality Date    ILEOSTOMY  04/25/2017    Ileostomy    ILEOSTOMY CLOSURE  08/17/2015    Ileostomy Closure    OTHER SURGICAL HISTORY  04/21/2017    Right Hemicolectomy    OTHER SURGICAL HISTORY  08/17/2015    Arteriovenous Surgery Creation Of A-V Fistula    OTHER SURGICAL HISTORY  08/17/2015    Sigmoidoscopy (Fiberoptic, Therapeutic )    PROSTATECTOMY  10/11/2013    Prostatectomy Radical    TRANSPLANT, KIDNEY, OPEN  1992    TRANSPLANT, KIDNEY, OPEN  2013    US GUIDED PERCUTANEOUS BIOPSY RENAL LEFT Left 11/20/2023    US GUIDED PERCUTANEOUS BIOPSY RENAL LEFT 11/20/2023 Lou Rodgers MD [facility]    US GUIDED PERCUTANEOUS PERITONEAL OR RETROPERITONEAL FLUID COLLECTION DRAINAGE  10/20/2022    US GUIDED PERCUTANEOUS PERITONEAL OR RETROPERITONEAL FLUID COLLECTION DRAINAGE 10/20/2022 [facility] CLINICAL LEGACY        SOCIAL HISTORY:  Social History     Socioeconomic History    Marital status: Single     Spouse name: Not on file    Number of children: Not on file    Years of education: Not on file    Highest education level: Not on file   Occupational History    Not on file   Tobacco Use    Smoking status: Never     Passive exposure: Past    Smokeless tobacco: Never   Vaping Use    Vaping status: Never Used   Substance and Sexual Activity    Alcohol use: Never    Drug use: Not on file    Sexual activity: Defer   Other Topics Concern    Not on file   Social History Narrative    Not on file     Social Drivers of Health     Financial Resource Strain: Low Risk  (1/29/2025)    Overall Financial Resource Strain (CARDIA)     Difficulty of Paying Living Expenses: Not hard at all   Food Insecurity: No Food Insecurity (1/27/2025)    Hunger  Vital Sign     Worried About Running Out of Food in the Last Year: Never true     Ran Out of Food in the Last Year: Never true   Transportation Needs: No Transportation Needs (1/29/2025)    PRAPARE - Transportation     Lack of Transportation (Medical): No     Lack of Transportation (Non-Medical): No   Physical Activity: Sufficiently Active (1/5/2025)    Exercise Vital Sign     Days of Exercise per Week: 7 days     Minutes of Exercise per Session: 60 min   Stress: No Stress Concern Present (1/5/2025)    [employer] [place] [employer] - Occupational Stress Questionnaire     Feeling of Stress : Not at all   Social Connections: Socially Isolated (1/5/2025)    Social Connection and Isolation Panel [NHANES]     Frequency of Communication with Friends and Family: More than three times a week     Frequency of Social Gatherings with Friends and Family: More than three times a week     Attends [employer] Services: Never     Active Member of Clubs or Organizations: No     Attends Club or Organization Meetings: Never     Marital Status: Never    Intimate Partner Violence: Not At Risk (1/27/2025)    Humiliation, Afraid, Rape, and Kick questionnaire     Fear of Current or Ex-Partner: No     Emotionally Abused: No     Physically Abused: No     Sexually Abused: No   Housing Stability: Low Risk  (1/29/2025)    Housing Stability Vital Sign     Unable to Pay for Housing in the Last Year: No     Number of Times Moved in the Last Year: 0     Homeless in the Last Year: No       FAMILY HISTORY:  Family History   Problem Relation Name Age of Onset    Bone cancer Mother      Other (corona's sarcome of the bone marrow) Mother      Prostate cancer Father      Diabetes Other Family Hist     Hypertension Other Family Hist        MEDICATION LIST:  Current Outpatient Medications   Medication Instructions    calcitriol (ROCALTROL) 0.5 mcg, oral, Daily    carvedilol (COREG) 37.5 mg, oral, 2 times daily, PATIENT NEEDS TO FOLLOW UP  "WITH CARDIOLOGY    cinacalcet (SENSIPAR) 30 mg, oral, Daily    Easy Touch Alcohol Prep Pads pads, medicated     epoetin aida 10,000 unit/mL injection Inject 1 mL (10,000 Units) under the skin every 7 days. Do not start before April 17, 2024.    Gvoke HypoPen 1-Pack 1 mg, intramuscular, Every 15 min PRN    imiquimod (Aldara) 5 % cream 1 packet, Topical, 3 times weekly    insulin glargine (LANTUS) 18 Units, subcutaneous, Every morning, Inject 20 units daily as directed    insulin lispro (HUMALOG KWIKPEN INSULIN) 3 Units, subcutaneous, 3 times daily (morning, midday, late afternoon), 2 units with meals plus sliding scale up to 30 units daily    isosorbide mononitrate ER (IMDUR) 60 mg, oral, Daily    ketorolac (Acular) 0.5 % ophthalmic solution Instill 1 drop into right eye three times a day FOR USE AFTER CATARACT SURGERY    ketorolac (Acular) 0.5 % ophthalmic solution Instill 1 drop into left eye three times a day FOR USE AFTER CATARACT SURGERY    lancets (Unilet Super Thin Lancets) 30 gauge misc 3 times daily    lancing device misc use as directed    metoclopramide (REGLAN) 5 mg, oral, 3 times daily before meals    neomycin-polymyxin-dexAMETHasone (Maxitrol) 3.5mg/mL-10,000 unit/mL-0.1 % ophthalmic suspension Instill 1 drop into right eye three times a day FOR USE AFTER CATARACT SURGERY    neomycin-polymyxin-dexAMETHasone (Maxitrol) 3.5mg/mL-10,000 unit/mL-0.1 % ophthalmic suspension Instill 1 drop into left eye three times a day FOR USE AFTER CATARACT SURGERY    NIFEdipine ER (ADALAT CC) 90 mg, oral, 2 times daily, Do not crush, chew, or split.    pantoprazole (ProtoNix) 40 mg EC tablet Take 1 tablet (40 mg) by mouth once daily in the morning. Take before meals. Do not crush, chew, or split. Do not start before January 22, 2024.    pen needle, diabetic (TechLITE Pen Needle) 32 gauge x 5/32\" needle Use 4 a day as directed    pravastatin (PRAVACHOL) 10 mg, oral, Nightly    sevelamer carbonate (RENVELA) 800 mg, oral, 3 " "times daily before meals, Swallow tablet whole; do not crush, break, or chew.    syringe with needle 3 mL 25 x 5/8\" syringe Use to inject epogen.    tacrolimus (PROGRAF) 0.5 mg, oral, 2 times daily    tacrolimus (Protopic) 0.1 % ointment Topical, 2 times daily    Unilet Super Thin Lancets 30 gauge misc 1 each, miscellaneous, 3 times daily    vitamin B complex-vitamin C-folic acid (Nephrocaps) 1 mg capsule 1 capsule, oral, Daily       ALLERGY  No Known Allergies    PHYSICAL EXAM:    Visit Vitals  /86 (BP Location: Right arm, Patient Position: Sitting, BP Cuff Size: Adult)   Pulse 68   Temp 37.6 °C (99.6 °F) (Temporal)   Wt 70.1 kg (154 lb 9.6 oz)   SpO2 98%   BMI 23.51 kg/m²   Smoking Status Never   BSA 1.83 m²          Vital signs - reviewed. Acceptable BP at this office visit.   General Appearance - NAD, Good speech, oriented and alert  HEENT - Supple. Not pale. No jaundice. No cervical lymphadenopathy. Pharynx and tonsils are not injected.  CVS - RRR. Normal S1/S2. No murmur, click , rub or gallop  Lungs- ocasional rhonchi, good air entry bilaterally  Abdomen - soft , not tender, no guarding, palpable LLQ allograft with tenderness to palpation  Musculoskeletal /Extremities - no edema. Full ROM. No joint tenderness.   Neuro/Psych - appropriate mood and affect. Motor power V/V all extremities. CN I -XII were grossly intact.  Skin - No visible rash  Access: LUE AVF    LABS:    Lab Results   Component Value Date    WBC 4.0 (L) 02/05/2025    HGB 8.7 (L) 02/05/2025    HCT 26.3 (L) 02/05/2025     (L) 02/05/2025    CHOL 80 12/16/2024    TRIG 47 12/16/2024    HDL 35.8 12/16/2024    ALT 11 01/26/2025    AST 32 01/26/2025     02/05/2025    K 4.0 02/05/2025    CL 98 02/05/2025    CREATININE 7.57 (H) 02/05/2025    BUN 31 (H) 02/05/2025    CO2 30 02/05/2025    TSH 1.94 07/19/2023    PSA <0.10 12/16/2024    INR 1.3 (H) 09/11/2024    HGBA1C 9.1 (H) 12/16/2024       ASSESSMENT AND PLAN:    Mr. Bashir is a 69 y.o. " male  who is here for follow up s/p kidney transplant.    TRANSPLANT DATE: 3/26/1994 (Kidney), 7/13/2013 (Kidney)    1. Graft failure - chronic rejection  - Creatinine last check was :  Lab Results   Component Value Date    CREATININE 7.57 (H) 02/05/2025     Maintain on TAC 0.5 mg BID + prednisone 5 mg once daily  CT chest/A/P per surgery  Empiric antibiotics: doxycycline 1000 mg BID x 10 days+ levofloxacin renally dosed x 5 days    Note plan for allograft nephrectomy when patient is more stable per surgery    Betty Ireland MD    Transplant Nephrology

## 2025-02-25 ENCOUNTER — PREP FOR PROCEDURE (OUTPATIENT)
Facility: HOSPITAL | Age: 70
End: 2025-02-25
Payer: MEDICARE

## 2025-02-25 DIAGNOSIS — Z94.0 KIDNEY REPLACED BY TRANSPLANT (HHS-HCC): ICD-10-CM

## 2025-02-25 DIAGNOSIS — T86.11 CHRONIC REJECTION OF KIDNEY TRANSPLANT (HHS-HCC): Primary | ICD-10-CM

## 2025-02-26 ENCOUNTER — APPOINTMENT (OUTPATIENT)
Facility: HOSPITAL | Age: 70
End: 2025-02-26
Payer: MEDICARE

## 2025-02-27 ENCOUNTER — APPOINTMENT (OUTPATIENT)
Dept: RADIOLOGY | Facility: HOSPITAL | Age: 70
End: 2025-02-27
Payer: MEDICARE

## 2025-02-27 ENCOUNTER — HOSPITAL ENCOUNTER (INPATIENT)
Facility: HOSPITAL | Age: 70
End: 2025-02-27
Attending: EMERGENCY MEDICINE | Admitting: INTERNAL MEDICINE
Payer: MEDICARE

## 2025-02-27 ENCOUNTER — CLINICAL SUPPORT (OUTPATIENT)
Dept: EMERGENCY MEDICINE | Facility: HOSPITAL | Age: 70
End: 2025-02-27
Payer: MEDICARE

## 2025-02-27 ENCOUNTER — APPOINTMENT (OUTPATIENT)
Dept: DIALYSIS | Facility: HOSPITAL | Age: 70
End: 2025-02-27
Payer: MEDICARE

## 2025-02-27 ENCOUNTER — APPOINTMENT (OUTPATIENT)
Dept: SURGERY | Facility: CLINIC | Age: 70
End: 2025-02-27
Payer: MEDICARE

## 2025-02-27 DIAGNOSIS — J18.9 PLEURAL EFFUSION ASSOCIATED WITH PULMONARY INFECTION: Primary | ICD-10-CM

## 2025-02-27 DIAGNOSIS — S31.109A WOUND OF GROIN: ICD-10-CM

## 2025-02-27 DIAGNOSIS — N18.4 TYPE 2 DIABETES MELLITUS WITH STAGE 4 CHRONIC KIDNEY DISEASE, WITH LONG-TERM CURRENT USE OF INSULIN (MULTI): ICD-10-CM

## 2025-02-27 DIAGNOSIS — H26.9 CATARACT OF BOTH EYES, UNSPECIFIED CATARACT TYPE: ICD-10-CM

## 2025-02-27 DIAGNOSIS — R91.8 GROUND GLASS OPACITY PRESENT ON IMAGING OF LUNG: ICD-10-CM

## 2025-02-27 DIAGNOSIS — J18.9 PNEUMONIA OF BOTH LUNGS DUE TO INFECTIOUS ORGANISM, UNSPECIFIED PART OF LUNG: ICD-10-CM

## 2025-02-27 DIAGNOSIS — Z79.4 TYPE 2 DIABETES MELLITUS WITHOUT COMPLICATION, WITH LONG-TERM CURRENT USE OF INSULIN (MULTI): ICD-10-CM

## 2025-02-27 DIAGNOSIS — I50.32 CHRONIC HEART FAILURE WITH PRESERVED EJECTION FRACTION: ICD-10-CM

## 2025-02-27 DIAGNOSIS — J91.8 PLEURAL EFFUSION ASSOCIATED WITH PULMONARY INFECTION: Primary | ICD-10-CM

## 2025-02-27 DIAGNOSIS — R05.9 COUGH, UNSPECIFIED TYPE: ICD-10-CM

## 2025-02-27 DIAGNOSIS — J90 PLEURAL EFFUSION: ICD-10-CM

## 2025-02-27 DIAGNOSIS — L29.9 PRURITUS: ICD-10-CM

## 2025-02-27 DIAGNOSIS — E11.65 TYPE 2 DIABETES MELLITUS WITH HYPERGLYCEMIA, WITH LONG-TERM CURRENT USE OF INSULIN: ICD-10-CM

## 2025-02-27 DIAGNOSIS — E87.70 HYPERVOLEMIA, UNSPECIFIED HYPERVOLEMIA TYPE: ICD-10-CM

## 2025-02-27 DIAGNOSIS — Z79.4 TYPE 2 DIABETES MELLITUS WITH STAGE 4 CHRONIC KIDNEY DISEASE, WITH LONG-TERM CURRENT USE OF INSULIN (MULTI): ICD-10-CM

## 2025-02-27 DIAGNOSIS — E11.22 TYPE 2 DIABETES MELLITUS WITH STAGE 4 CHRONIC KIDNEY DISEASE, WITH LONG-TERM CURRENT USE OF INSULIN (MULTI): ICD-10-CM

## 2025-02-27 DIAGNOSIS — R07.9 ACUTE CHEST PAIN: ICD-10-CM

## 2025-02-27 DIAGNOSIS — E11.9 TYPE 2 DIABETES MELLITUS WITHOUT COMPLICATION, WITH LONG-TERM CURRENT USE OF INSULIN (MULTI): ICD-10-CM

## 2025-02-27 DIAGNOSIS — Z79.4 TYPE 2 DIABETES MELLITUS WITH HYPERGLYCEMIA, WITH LONG-TERM CURRENT USE OF INSULIN: ICD-10-CM

## 2025-02-27 DIAGNOSIS — J31.0 RHINITIS, UNSPECIFIED TYPE: ICD-10-CM

## 2025-02-27 DIAGNOSIS — T86.11 CHRONIC REJECTION OF KIDNEY TRANSPLANT (HHS-HCC): ICD-10-CM

## 2025-02-27 LAB
ALBUMIN SERPL BCP-MCNC: 3.2 G/DL (ref 3.4–5)
ALP SERPL-CCNC: 64 U/L (ref 33–136)
ALT SERPL W P-5'-P-CCNC: 8 U/L (ref 10–52)
ANION GAP BLDV CALCULATED.4IONS-SCNC: 12 MMOL/L (ref 10–25)
ANION GAP SERPL CALC-SCNC: 14 MMOL/L (ref 10–20)
AST SERPL W P-5'-P-CCNC: 16 U/L (ref 9–39)
ATRIAL RATE: 66 BPM
ATRIAL RATE: 70 BPM
ATRIAL RATE: 70 BPM
B-OH-BUTYR SERPL-SCNC: 0.1 MMOL/L (ref 0.02–0.27)
BASE EXCESS BLDV CALC-SCNC: 0.1 MMOL/L (ref -2–3)
BASOPHILS # BLD AUTO: 0.01 X10*3/UL (ref 0–0.1)
BASOPHILS NFR BLD AUTO: 0.5 %
BILIRUB SERPL-MCNC: 0.4 MG/DL (ref 0–1.2)
BNP SERPL-MCNC: 1042 PG/ML (ref 0–99)
BODY TEMPERATURE: 37 DEGREES CELSIUS
BUN SERPL-MCNC: 25 MG/DL (ref 6–23)
CA-I BLDV-SCNC: 1.25 MMOL/L (ref 1.1–1.33)
CALCIUM SERPL-MCNC: 8.9 MG/DL (ref 8.6–10.6)
CARDIAC TROPONIN I PNL SERPL HS: 28 NG/L (ref 0–53)
CARDIAC TROPONIN I PNL SERPL HS: 33 NG/L (ref 0–53)
CHLORIDE BLDV-SCNC: 100 MMOL/L (ref 98–107)
CHLORIDE SERPL-SCNC: 100 MMOL/L (ref 98–107)
CMV IGG AVIDITY SERPL IA-RTO: REACTIVE %
CO2 SERPL-SCNC: 24 MMOL/L (ref 21–32)
CREAT SERPL-MCNC: 9.6 MG/DL (ref 0.5–1.3)
EBV EA IGG SER QL: POSITIVE
EBV NA AB SER QL: POSITIVE
EBV VCA IGG SER IA-ACNC: POSITIVE
EBV VCA IGM SER IA-ACNC: NEGATIVE
EGFRCR SERPLBLD CKD-EPI 2021: 5 ML/MIN/1.73M*2
EOSINOPHIL # BLD AUTO: 0.02 X10*3/UL (ref 0–0.7)
EOSINOPHIL NFR BLD AUTO: 1.1 %
ERYTHROCYTE [DISTWIDTH] IN BLOOD BY AUTOMATED COUNT: 15 % (ref 11.5–14.5)
GLUCOSE BLD MANUAL STRIP-MCNC: 149 MG/DL (ref 74–99)
GLUCOSE BLD MANUAL STRIP-MCNC: 250 MG/DL (ref 74–99)
GLUCOSE BLDV-MCNC: 363 MG/DL (ref 74–99)
GLUCOSE SERPL-MCNC: 348 MG/DL (ref 74–99)
HCO3 BLDV-SCNC: 24.7 MMOL/L (ref 22–26)
HCT VFR BLD AUTO: 25.8 % (ref 41–52)
HCT VFR BLD EST: 28 % (ref 41–52)
HGB BLD-MCNC: 8.8 G/DL (ref 13.5–17.5)
HGB BLDV-MCNC: 9.4 G/DL (ref 13.5–17.5)
IMM GRANULOCYTES # BLD AUTO: 0.01 X10*3/UL (ref 0–0.7)
IMM GRANULOCYTES NFR BLD AUTO: 0.5 % (ref 0–0.9)
LACTATE BLDV-SCNC: 0.8 MMOL/L (ref 0.4–2)
LYMPHOCYTES # BLD AUTO: 0.5 X10*3/UL (ref 1.2–4.8)
LYMPHOCYTES NFR BLD AUTO: 27.3 %
MAGNESIUM SERPL-MCNC: 1.73 MG/DL (ref 1.6–2.4)
MCH RBC QN AUTO: 27.8 PG (ref 26–34)
MCHC RBC AUTO-ENTMCNC: 34.1 G/DL (ref 32–36)
MCV RBC AUTO: 81 FL (ref 80–100)
MONOCYTES # BLD AUTO: 0.27 X10*3/UL (ref 0.1–1)
MONOCYTES NFR BLD AUTO: 14.8 %
NEUTROPHILS # BLD AUTO: 1.02 X10*3/UL (ref 1.2–7.7)
NEUTROPHILS NFR BLD AUTO: 55.8 %
NRBC BLD-RTO: 0 /100 WBCS (ref 0–0)
OVALOCYTES BLD QL SMEAR: NORMAL
OXYHGB MFR BLDV: 79.9 % (ref 45–75)
P AXIS: 73 DEGREES
P AXIS: 74 DEGREES
P AXIS: 76 DEGREES
P OFFSET: 159 MS
P OFFSET: 164 MS
P OFFSET: 164 MS
P ONSET: 124 MS
P ONSET: 125 MS
P ONSET: 125 MS
PCO2 BLDV: 39 MM HG (ref 41–51)
PH BLDV: 7.41 PH (ref 7.33–7.43)
PHOSPHATE SERPL-MCNC: 3.5 MG/DL (ref 2.5–4.9)
PLATELET # BLD AUTO: 82 X10*3/UL (ref 150–450)
PO2 BLDV: 50 MM HG (ref 35–45)
POLYCHROMASIA BLD QL SMEAR: NORMAL
POTASSIUM BLDV-SCNC: 4.9 MMOL/L (ref 3.5–5.3)
POTASSIUM SERPL-SCNC: 4.7 MMOL/L (ref 3.5–5.3)
PR INTERVAL: 178 MS
PR INTERVAL: 180 MS
PR INTERVAL: 182 MS
PROT SERPL-MCNC: 6.4 G/DL (ref 6.4–8.2)
Q ONSET: 214 MS
Q ONSET: 215 MS
Q ONSET: 215 MS
QRS COUNT: 11 BEATS
QRS COUNT: 12 BEATS
QRS COUNT: 12 BEATS
QRS DURATION: 146 MS
QRS DURATION: 146 MS
QRS DURATION: 148 MS
QT INTERVAL: 440 MS
QT INTERVAL: 442 MS
QT INTERVAL: 458 MS
QTC CALCULATION(BAZETT): 463 MS
QTC CALCULATION(BAZETT): 475 MS
QTC CALCULATION(BAZETT): 494 MS
QTC FREDERICIA: 456 MS
QTC FREDERICIA: 463 MS
QTC FREDERICIA: 482 MS
R AXIS: -30 DEGREES
R AXIS: 111 DEGREES
R AXIS: 120 DEGREES
RBC # BLD AUTO: 3.17 X10*6/UL (ref 4.5–5.9)
RBC MORPH BLD: NORMAL
SAO2 % BLDV: 82 % (ref 45–75)
SCHISTOCYTES BLD QL SMEAR: NORMAL
SODIUM BLDV-SCNC: 132 MMOL/L (ref 136–145)
SODIUM SERPL-SCNC: 133 MMOL/L (ref 136–145)
T AXIS: 10 DEGREES
T AXIS: 25 DEGREES
T AXIS: 26 DEGREES
T OFFSET: 435 MS
T OFFSET: 435 MS
T OFFSET: 444 MS
VENTRICULAR RATE: 66 BPM
VENTRICULAR RATE: 70 BPM
VENTRICULAR RATE: 70 BPM
WBC # BLD AUTO: 1.8 X10*3/UL (ref 4.4–11.3)

## 2025-02-27 PROCEDURE — 2500000002 HC RX 250 W HCPCS SELF ADMINISTERED DRUGS (ALT 637 FOR MEDICARE OP, ALT 636 FOR OP/ED)

## 2025-02-27 PROCEDURE — 87799 DETECT AGENT NOS DNA QUANT: CPT

## 2025-02-27 PROCEDURE — 87040 BLOOD CULTURE FOR BACTERIA: CPT

## 2025-02-27 PROCEDURE — 99285 EMERGENCY DEPT VISIT HI MDM: CPT | Performed by: EMERGENCY MEDICINE

## 2025-02-27 PROCEDURE — 93010 ELECTROCARDIOGRAM REPORT: CPT | Performed by: EMERGENCY MEDICINE

## 2025-02-27 PROCEDURE — 85025 COMPLETE CBC W/AUTO DIFF WBC: CPT

## 2025-02-27 PROCEDURE — 2500000004 HC RX 250 GENERAL PHARMACY W/ HCPCS (ALT 636 FOR OP/ED)

## 2025-02-27 PROCEDURE — 99221 1ST HOSP IP/OBS SF/LOW 40: CPT | Performed by: INTERNAL MEDICINE

## 2025-02-27 PROCEDURE — 86645 CMV ANTIBODY IGM: CPT

## 2025-02-27 PROCEDURE — 90935 HEMODIALYSIS ONE EVALUATION: CPT | Performed by: INTERNAL MEDICINE

## 2025-02-27 PROCEDURE — 83880 ASSAY OF NATRIURETIC PEPTIDE: CPT

## 2025-02-27 PROCEDURE — 84484 ASSAY OF TROPONIN QUANT: CPT

## 2025-02-27 PROCEDURE — 83735 ASSAY OF MAGNESIUM: CPT

## 2025-02-27 PROCEDURE — 86663 EPSTEIN-BARR ANTIBODY: CPT

## 2025-02-27 PROCEDURE — 2500000001 HC RX 250 WO HCPCS SELF ADMINISTERED DRUGS (ALT 637 FOR MEDICARE OP): Performed by: INTERNAL MEDICINE

## 2025-02-27 PROCEDURE — 2500000001 HC RX 250 WO HCPCS SELF ADMINISTERED DRUGS (ALT 637 FOR MEDICARE OP)

## 2025-02-27 PROCEDURE — 71046 X-RAY EXAM CHEST 2 VIEWS: CPT

## 2025-02-27 PROCEDURE — 96374 THER/PROPH/DIAG INJ IV PUSH: CPT

## 2025-02-27 PROCEDURE — 1210000001 HC SEMI-PRIVATE ROOM DAILY

## 2025-02-27 PROCEDURE — 99223 1ST HOSP IP/OBS HIGH 75: CPT

## 2025-02-27 PROCEDURE — 96376 TX/PRO/DX INJ SAME DRUG ADON: CPT

## 2025-02-27 PROCEDURE — 99285 EMERGENCY DEPT VISIT HI MDM: CPT | Mod: 25 | Performed by: EMERGENCY MEDICINE

## 2025-02-27 PROCEDURE — 36415 COLL VENOUS BLD VENIPUNCTURE: CPT

## 2025-02-27 PROCEDURE — 82010 KETONE BODYS QUAN: CPT

## 2025-02-27 PROCEDURE — 83010 ASSAY OF HAPTOGLOBIN QUANT: CPT

## 2025-02-27 PROCEDURE — 93005 ELECTROCARDIOGRAM TRACING: CPT

## 2025-02-27 PROCEDURE — 82947 ASSAY GLUCOSE BLOOD QUANT: CPT

## 2025-02-27 PROCEDURE — 86644 CMV ANTIBODY: CPT

## 2025-02-27 PROCEDURE — 5A1D70Z PERFORMANCE OF URINARY FILTRATION, INTERMITTENT, LESS THAN 6 HOURS PER DAY: ICD-10-PCS | Performed by: INTERNAL MEDICINE

## 2025-02-27 PROCEDURE — 84132 ASSAY OF SERUM POTASSIUM: CPT

## 2025-02-27 PROCEDURE — 8010000001 HC DIALYSIS - HEMODIALYSIS PER DAY

## 2025-02-27 PROCEDURE — 71046 X-RAY EXAM CHEST 2 VIEWS: CPT | Mod: FOREIGN READ | Performed by: RADIOLOGY

## 2025-02-27 PROCEDURE — 84100 ASSAY OF PHOSPHORUS: CPT

## 2025-02-27 RX ORDER — HYDRALAZINE HYDROCHLORIDE 20 MG/ML
5 INJECTION INTRAMUSCULAR; INTRAVENOUS ONCE
Status: COMPLETED | OUTPATIENT
Start: 2025-02-27 | End: 2025-02-27

## 2025-02-27 RX ORDER — INSULIN GLARGINE 100 [IU]/ML
9 INJECTION, SOLUTION SUBCUTANEOUS EVERY MORNING
Status: DISCONTINUED | OUTPATIENT
Start: 2025-02-28 | End: 2025-02-27

## 2025-02-27 RX ORDER — SEVELAMER CARBONATE 800 MG/1
800 TABLET, FILM COATED ORAL
Status: DISCONTINUED | OUTPATIENT
Start: 2025-02-27 | End: 2025-03-06 | Stop reason: HOSPADM

## 2025-02-27 RX ORDER — POLYETHYLENE GLYCOL 3350 17 G/17G
17 POWDER, FOR SOLUTION ORAL DAILY
Status: DISCONTINUED | OUTPATIENT
Start: 2025-02-28 | End: 2025-03-06 | Stop reason: HOSPADM

## 2025-02-27 RX ORDER — METOCLOPRAMIDE 10 MG/1
5 TABLET ORAL
Status: DISCONTINUED | OUTPATIENT
Start: 2025-02-28 | End: 2025-03-06 | Stop reason: HOSPADM

## 2025-02-27 RX ORDER — ACETAMINOPHEN 325 MG/1
650 TABLET ORAL ONCE
Status: COMPLETED | OUTPATIENT
Start: 2025-02-27 | End: 2025-02-27

## 2025-02-27 RX ORDER — INSULIN GLARGINE 100 [IU]/ML
10 INJECTION, SOLUTION SUBCUTANEOUS EVERY MORNING
Status: DISCONTINUED | OUTPATIENT
Start: 2025-02-28 | End: 2025-03-01

## 2025-02-27 RX ORDER — PANTOPRAZOLE SODIUM 40 MG/1
40 TABLET, DELAYED RELEASE ORAL
Status: DISCONTINUED | OUTPATIENT
Start: 2025-02-28 | End: 2025-03-06 | Stop reason: HOSPADM

## 2025-02-27 RX ORDER — VANCOMYCIN HYDROCHLORIDE 1 G/200ML
1000 INJECTION, SOLUTION INTRAVENOUS ONCE
Status: COMPLETED | OUTPATIENT
Start: 2025-02-27 | End: 2025-02-28

## 2025-02-27 RX ORDER — TACROLIMUS 0.5 MG/1
0.5 CAPSULE ORAL
Status: DISCONTINUED | OUTPATIENT
Start: 2025-02-27 | End: 2025-03-06 | Stop reason: HOSPADM

## 2025-02-27 RX ORDER — ACETAMINOPHEN 325 MG/1
650 TABLET ORAL EVERY 6 HOURS PRN
Status: DISCONTINUED | OUTPATIENT
Start: 2025-02-27 | End: 2025-02-28

## 2025-02-27 RX ORDER — KETOROLAC TROMETHAMINE 5 MG/ML
1 SOLUTION OPHTHALMIC EVERY 8 HOURS
Status: DISCONTINUED | OUTPATIENT
Start: 2025-02-27 | End: 2025-03-01

## 2025-02-27 RX ORDER — ISOSORBIDE MONONITRATE 30 MG/1
60 TABLET, EXTENDED RELEASE ORAL DAILY
Status: DISCONTINUED | OUTPATIENT
Start: 2025-02-27 | End: 2025-02-27

## 2025-02-27 RX ORDER — NIFEDIPINE 30 MG/1
90 TABLET, FILM COATED, EXTENDED RELEASE ORAL ONCE
Status: COMPLETED | OUTPATIENT
Start: 2025-02-27 | End: 2025-02-27

## 2025-02-27 RX ORDER — PRAVASTATIN SODIUM 10 MG/1
10 TABLET ORAL NIGHTLY
Status: DISCONTINUED | OUTPATIENT
Start: 2025-02-27 | End: 2025-03-06 | Stop reason: HOSPADM

## 2025-02-27 RX ORDER — DEXTROSE 50 % IN WATER (D50W) INTRAVENOUS SYRINGE
25
Status: DISCONTINUED | OUTPATIENT
Start: 2025-02-27 | End: 2025-03-06 | Stop reason: HOSPADM

## 2025-02-27 RX ORDER — CLONIDINE HYDROCHLORIDE 0.1 MG/1
0.1 TABLET ORAL ONCE
Status: COMPLETED | OUTPATIENT
Start: 2025-02-27 | End: 2025-02-27

## 2025-02-27 RX ORDER — CALCITRIOL 0.5 UG/1
0.5 CAPSULE ORAL DAILY
Status: DISCONTINUED | OUTPATIENT
Start: 2025-02-28 | End: 2025-02-28

## 2025-02-27 RX ORDER — VANCOMYCIN HYDROCHLORIDE 750 MG/150ML
750 INJECTION, SOLUTION INTRAVENOUS ONCE
Status: COMPLETED | OUTPATIENT
Start: 2025-02-27 | End: 2025-02-28

## 2025-02-27 RX ORDER — OXYCODONE HYDROCHLORIDE 5 MG/1
5 TABLET ORAL EVERY 6 HOURS PRN
Status: DISCONTINUED | OUTPATIENT
Start: 2025-02-27 | End: 2025-03-04

## 2025-02-27 RX ORDER — CARVEDILOL 12.5 MG/1
25 TABLET ORAL ONCE
Status: COMPLETED | OUTPATIENT
Start: 2025-02-27 | End: 2025-02-27

## 2025-02-27 RX ORDER — DEXTROSE 50 % IN WATER (D50W) INTRAVENOUS SYRINGE
12.5
Status: DISCONTINUED | OUTPATIENT
Start: 2025-02-27 | End: 2025-03-06 | Stop reason: HOSPADM

## 2025-02-27 RX ORDER — ISOSORBIDE MONONITRATE 30 MG/1
60 TABLET, EXTENDED RELEASE ORAL DAILY
Status: DISCONTINUED | OUTPATIENT
Start: 2025-02-28 | End: 2025-03-06 | Stop reason: HOSPADM

## 2025-02-27 RX ORDER — HEPARIN SODIUM 5000 [USP'U]/ML
5000 INJECTION, SOLUTION INTRAVENOUS; SUBCUTANEOUS EVERY 8 HOURS SCHEDULED
Status: DISPENSED | OUTPATIENT
Start: 2025-02-27 | End: 2025-03-06

## 2025-02-27 RX ORDER — VANCOMYCIN HYDROCHLORIDE 1 G/20ML
INJECTION, POWDER, LYOPHILIZED, FOR SOLUTION INTRAVENOUS DAILY PRN
Status: DISCONTINUED | OUTPATIENT
Start: 2025-02-27 | End: 2025-02-28

## 2025-02-27 RX ORDER — OXYCODONE HYDROCHLORIDE 5 MG/1
10 TABLET ORAL EVERY 6 HOURS PRN
Status: DISCONTINUED | OUTPATIENT
Start: 2025-02-27 | End: 2025-03-04

## 2025-02-27 RX ORDER — NIFEDIPINE 90 MG/1
90 TABLET, EXTENDED RELEASE ORAL 2 TIMES DAILY
Status: DISCONTINUED | OUTPATIENT
Start: 2025-02-27 | End: 2025-03-06 | Stop reason: HOSPADM

## 2025-02-27 RX ORDER — BENZONATATE 100 MG/1
100 CAPSULE ORAL ONCE
Status: COMPLETED | OUTPATIENT
Start: 2025-02-27 | End: 2025-02-27

## 2025-02-27 RX ORDER — INSULIN LISPRO 100 [IU]/ML
0-10 INJECTION, SOLUTION INTRAVENOUS; SUBCUTANEOUS
Status: DISCONTINUED | OUTPATIENT
Start: 2025-02-27 | End: 2025-03-03

## 2025-02-27 RX ORDER — NITROGLYCERIN 0.4 MG/1
0.4 TABLET SUBLINGUAL EVERY 6 HOURS PRN
Status: DISCONTINUED | OUTPATIENT
Start: 2025-02-27 | End: 2025-03-06 | Stop reason: HOSPADM

## 2025-02-27 RX ORDER — CINACALCET 30 MG/1
30 TABLET, FILM COATED ORAL DAILY
Status: DISCONTINUED | OUTPATIENT
Start: 2025-02-27 | End: 2025-03-06 | Stop reason: HOSPADM

## 2025-02-27 RX ORDER — PREDNISONE 5 MG/1
5 TABLET ORAL DAILY
Status: DISCONTINUED | OUTPATIENT
Start: 2025-02-28 | End: 2025-03-06 | Stop reason: HOSPADM

## 2025-02-27 RX ORDER — CARVEDILOL 12.5 MG/1
12.5 TABLET ORAL ONCE
Status: COMPLETED | OUTPATIENT
Start: 2025-02-27 | End: 2025-02-27

## 2025-02-27 RX ORDER — NEOMYCIN SULFATE, POLYMYXIN B SULFATE AND DEXAMETHASONE 3.5; 10000; 1 MG/ML; [USP'U]/ML; MG/ML
1 SUSPENSION/ DROPS OPHTHALMIC EVERY 8 HOURS SCHEDULED
Status: DISCONTINUED | OUTPATIENT
Start: 2025-02-27 | End: 2025-03-01

## 2025-02-27 RX ORDER — HYDRALAZINE HYDROCHLORIDE 20 MG/ML
10 INJECTION INTRAMUSCULAR; INTRAVENOUS ONCE
Status: COMPLETED | OUTPATIENT
Start: 2025-02-27 | End: 2025-02-27

## 2025-02-27 RX ADMIN — HYDRALAZINE HYDROCHLORIDE 5 MG: 20 INJECTION INTRAMUSCULAR; INTRAVENOUS at 13:35

## 2025-02-27 RX ADMIN — NIFEDIPINE 90 MG: 30 TABLET, FILM COATED, EXTENDED RELEASE ORAL at 13:35

## 2025-02-27 RX ADMIN — CINACALCET HYDROCHLORIDE 30 MG: 30 TABLET, FILM COATED ORAL at 22:13

## 2025-02-27 RX ADMIN — CARVEDILOL 12.5 MG: 12.5 TABLET, FILM COATED ORAL at 13:35

## 2025-02-27 RX ADMIN — HYDROMORPHONE HYDROCHLORIDE 0.5 MG: 1 INJECTION, SOLUTION INTRAMUSCULAR; INTRAVENOUS; SUBCUTANEOUS at 15:18

## 2025-02-27 RX ADMIN — TACROLIMUS 0.5 MG: 0.5 CAPSULE ORAL at 22:13

## 2025-02-27 RX ADMIN — OXYCODONE 5 MG: 5 TABLET ORAL at 22:13

## 2025-02-27 RX ADMIN — CLONIDINE HYDROCHLORIDE 0.1 MG: 0.1 TABLET ORAL at 18:14

## 2025-02-27 RX ADMIN — SEVELAMER CARBONATE 800 MG: 800 TABLET, FILM COATED ORAL at 22:12

## 2025-02-27 RX ADMIN — NIFEDIPINE 90 MG: 90 TABLET, FILM COATED, EXTENDED RELEASE ORAL at 22:14

## 2025-02-27 RX ADMIN — PRAVASTATIN SODIUM 10 MG: 20 TABLET ORAL at 22:15

## 2025-02-27 RX ADMIN — HEPARIN SODIUM 5000 UNITS: 5000 INJECTION, SOLUTION INTRAVENOUS; SUBCUTANEOUS at 22:16

## 2025-02-27 RX ADMIN — CARVEDILOL 25 MG: 12.5 TABLET, FILM COATED ORAL at 15:17

## 2025-02-27 RX ADMIN — BENZONATATE 100 MG: 100 CAPSULE ORAL at 12:44

## 2025-02-27 RX ADMIN — HYDRALAZINE HYDROCHLORIDE 10 MG: 20 INJECTION INTRAMUSCULAR; INTRAVENOUS at 14:56

## 2025-02-27 RX ADMIN — ACETAMINOPHEN 650 MG: 325 TABLET ORAL at 12:44

## 2025-02-27 RX ADMIN — VANCOMYCIN HYDROCHLORIDE 1000 MG: 1 INJECTION, SOLUTION INTRAVENOUS at 23:05

## 2025-02-27 RX ADMIN — CARVEDILOL 37.5 MG: 12.5 TABLET, FILM COATED ORAL at 22:15

## 2025-02-27 RX ADMIN — ISOSORBIDE MONONITRATE 60 MG: 30 TABLET, EXTENDED RELEASE ORAL at 13:34

## 2025-02-27 RX ADMIN — PIPERACILLIN SODIUM AND TAZOBACTAM SODIUM 3.38 G: 3; .375 INJECTION, SOLUTION INTRAVENOUS at 22:17

## 2025-02-27 ASSESSMENT — PAIN DESCRIPTION - LOCATION
LOCATION: CHEST
LOCATION: BACK

## 2025-02-27 ASSESSMENT — PAIN SCALES - GENERAL
PAINLEVEL_OUTOF10: 6
PAINLEVEL_OUTOF10: 8
PAINLEVEL_OUTOF10: 7
PAINLEVEL_OUTOF10: 0 - NO PAIN

## 2025-02-27 ASSESSMENT — PAIN DESCRIPTION - DESCRIPTORS: DESCRIPTORS: TIGHTNESS

## 2025-02-27 ASSESSMENT — PAIN - FUNCTIONAL ASSESSMENT
PAIN_FUNCTIONAL_ASSESSMENT: NO/DENIES PAIN
PAIN_FUNCTIONAL_ASSESSMENT: 0-10

## 2025-02-27 ASSESSMENT — PAIN DESCRIPTION - ORIENTATION: ORIENTATION: LEFT

## 2025-02-27 NOTE — PROCEDURES
"DIALYSIS NOTE:    C/o SOB     Seen and examined during hemodialysis, undergoing treatment per submitted orders: 2 K, 2.5 Ca, 3.75  hours. Fluid removal 2 liters, as tolerated (keep SBP> 90mmHg).     BP (!) 194/92   Pulse 65   Temp 36.1 °C (97 °F) (Temporal)   Resp 14   Ht 1.727 m (5' 8\")   Wt 68 kg (150 lb)   SpO2 100%   BMI 22.81 kg/m²     Additional Recommendations:  Resume all outpatient antihypertensives ASAP    Scheduled medications  heparin (porcine), 5,000 Units, subcutaneous, q8h ZOË  isosorbide mononitrate ER, 60 mg, oral, Daily  [START ON 2/28/2025] polyethylene glycol, 17 g, oral, Daily      Continuous medications     PRN medications    Recent Results (from the past 24 hours)   Blood Gas Venous Full Panel Unsolicited    Collection Time: 02/27/25  7:56 AM   Result Value Ref Range    POCT pH, Venous 7.41 7.33 - 7.43 pH    POCT pCO2, Venous 39 (L) 41 - 51 mm Hg    POCT pO2, Venous 50 (H) 35 - 45 mm Hg    POCT SO2, Venous 82 (H) 45 - 75 %    POCT Oxy Hemoglobin, Venous 79.9 (H) 45.0 - 75.0 %    POCT Hematocrit Calculated, Venous 28.0 (L) 41.0 - 52.0 %    POCT Sodium, Venous 132 (L) 136 - 145 mmol/L    POCT Potassium, Venous 4.9 3.5 - 5.3 mmol/L    POCT Chloride, Venous 100 98 - 107 mmol/L    POCT Ionized Calicum, Venous 1.25 1.10 - 1.33 mmol/L    POCT Glucose, Venous 363 (H) 74 - 99 mg/dL    POCT Lactate, Venous 0.8 0.4 - 2.0 mmol/L    POCT Base Excess, Venous 0.1 -2.0 - 3.0 mmol/L    POCT HCO3 Calculated, Venous 24.7 22.0 - 26.0 mmol/L    POCT Hemoglobin, Venous 9.4 (L) 13.5 - 17.5 g/dL    POCT Anion Gap, Venous 12.0 10.0 - 25.0 mmol/L    Patient Temperature 37.0 degrees Celsius   CBC and Auto Differential    Collection Time: 02/27/25  8:09 AM   Result Value Ref Range    WBC 1.8 (L) 4.4 - 11.3 x10*3/uL    nRBC 0.0 0.0 - 0.0 /100 WBCs    RBC 3.17 (L) 4.50 - 5.90 x10*6/uL    Hemoglobin 8.8 (L) 13.5 - 17.5 g/dL    Hematocrit 25.8 (L) 41.0 - 52.0 %    MCV 81 80 - 100 fL    MCH 27.8 26.0 - 34.0 pg    MCHC " 34.1 32.0 - 36.0 g/dL    RDW 15.0 (H) 11.5 - 14.5 %    Platelets 82 (L) 150 - 450 x10*3/uL    Neutrophils % 55.8 40.0 - 80.0 %    Immature Granulocytes %, Automated 0.5 0.0 - 0.9 %    Lymphocytes % 27.3 13.0 - 44.0 %    Monocytes % 14.8 2.0 - 10.0 %    Eosinophils % 1.1 0.0 - 6.0 %    Basophils % 0.5 0.0 - 2.0 %    Neutrophils Absolute 1.02 (L) 1.20 - 7.70 x10*3/uL    Immature Granulocytes Absolute, Automated 0.01 0.00 - 0.70 x10*3/uL    Lymphocytes Absolute 0.50 (L) 1.20 - 4.80 x10*3/uL    Monocytes Absolute 0.27 0.10 - 1.00 x10*3/uL    Eosinophils Absolute 0.02 0.00 - 0.70 x10*3/uL    Basophils Absolute 0.01 0.00 - 0.10 x10*3/uL   Phosphorus    Collection Time: 02/27/25  8:09 AM   Result Value Ref Range    Phosphorus 3.5 2.5 - 4.9 mg/dL   Magnesium    Collection Time: 02/27/25  8:09 AM   Result Value Ref Range    Magnesium 1.73 1.60 - 2.40 mg/dL   Comprehensive metabolic panel    Collection Time: 02/27/25  8:09 AM   Result Value Ref Range    Glucose 348 (H) 74 - 99 mg/dL    Sodium 133 (L) 136 - 145 mmol/L    Potassium 4.7 3.5 - 5.3 mmol/L    Chloride 100 98 - 107 mmol/L    Bicarbonate 24 21 - 32 mmol/L    Anion Gap 14 10 - 20 mmol/L    Urea Nitrogen 25 (H) 6 - 23 mg/dL    Creatinine 9.60 (H) 0.50 - 1.30 mg/dL    eGFR 5 (L) >60 mL/min/1.73m*2    Calcium 8.9 8.6 - 10.6 mg/dL    Albumin 3.2 (L) 3.4 - 5.0 g/dL    Alkaline Phosphatase 64 33 - 136 U/L    Total Protein 6.4 6.4 - 8.2 g/dL    AST 16 9 - 39 U/L    Bilirubin, Total 0.4 0.0 - 1.2 mg/dL    ALT 8 (L) 10 - 52 U/L   B-Type Natriuretic Peptide    Collection Time: 02/27/25  8:09 AM   Result Value Ref Range    BNP 1,042 (H) 0 - 99 pg/mL   Troponin I, High Sensitivity, Initial    Collection Time: 02/27/25  8:09 AM   Result Value Ref Range    Troponin I, High Sensitivity ([facility]) 33 0 - 53 ng/L   Morphology    Collection Time: 02/27/25  8:09 AM   Result Value Ref Range    RBC Morphology See Below     Polychromasia Mild     RBC Fragments Few     Ovalocytes Many     Troponin, High Sensitivity, 1 Hour    Collection Time: 02/27/25  9:59 AM   Result Value Ref Range    Troponin I, High Sensitivity ([facility]) 28 0 - 53 ng/L

## 2025-02-27 NOTE — ED TRIAGE NOTES
Pt was brought in by EMS after waking up this morning with chest pain. Pt receives dialysis 3x a week and had an appointment today. Pt denies missing any dialysis appointments. During arrival with EMS pt received 324 mg aspirin and 0.4 of nitroglycerin. Pt's BG was 486 per EMS. Pt currently denies any chest pain or SOB.

## 2025-02-27 NOTE — NURSING NOTE
Report from Sending RN:    Report From: AYO Krueger  Recent Surgery of Procedure: No  Baseline Level of Consciousness (LOC): A/O X 4  Oxygen Use: No  Type: N/A  Diabetic: No  Last BP Med Given Day of Dialysis: yes, see EMAR  Last Pain Med Given: none  Lab Tests to be Obtained with Dialysis: No  Blood Transfusion to be Given During Dialysis: No  Available IV Access: Yes  Medications to be Administered During Dialysis: No  Continuous IV Infusion Running: No  Restraints on Currently or in the Last 24 Hours: No  Hand-Off Communication: full code, no isolation, ED admit for chest pain, pt does not have to be on telemetry for HD treatment, travel by cart  Dialysis Catheter Dressing: N/A fistula  Last Dressing Change: N/A

## 2025-02-27 NOTE — LETTER
March 3, 2025     Patient: MAGALI JEAN-BAPTISTE       Date of Admission : 2/27/2025       To Whom It May Concern:    Manolo Bashir was in the hospital visiting his father who is undergoing several days of hospital care. Please excuse Mr. Bashir for his absence from work 3/3/2025 .    If you have any questions or concerns, please don't hesitate to call.         Sincerely,         Constance Rapp MD, MPH, MS     [facility] Medicine Team         CC: [facility] RN

## 2025-02-27 NOTE — NURSING NOTE
Report to Receiving RN:    Report To: AYO Gonzales  Time Report Called: 17:11  Hand-Off Communication: tolerated tx well no issues or concerns, removed 2L of fluid, post tx /75 HR 73, alert and stable post tx   Complications During Treatment: No  Ultrafiltration Treatment: No  Medications Administered During Dialysis: No  Blood Products Administered During Dialysis: No  Labs Sent During Dialysis: No  Heparin Drip Rate Changes: N/A  Dialysis Catheter Dressing: N/A fistula  Last Dressing Change: N/A      Last Updated: 5:11 PM by DES SILVESTRE

## 2025-02-27 NOTE — H&P
MEDICINE ADMISSION NOTE    History of Present Illness  Manolo Bashir is a 69 y.o. male with PMHx of Manolo Bashir is a 69 y.o. 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 He described the pain as 10/10 without radiation.  This is a first episode of chest pain no similar episodes in the past. His daughter was present and called EMS, who gave the patient aspirin and nitroglycerin on arrival. Patient says his symptoms resolved shortly after. BG was 486    He currently denies chest pain. He endorses a cough (recently treated for pneumonia) which has not worsened, also reports shortness of breath for which she usually uses a recliner at home but this has been there for a while even postdischarge. He reports having vomited once today nonbloody and also feeling nauseous, reports having headache which he attributed to the nitroglycerin.    And also reports abdominal pain, more severe and tender around a site of kidney transplant severity about 8.  Denies any frequency or dysuria states he barely pees .    He also has back pain which he attributes to his long hospital stay in the bed he had to lie on.    He reports not taking his blood pressure medications this morning as he was going for dialysis     He denies fever, chills,lightheadedness, vision changes, lower extremity pain/swelling.  He denies any history of cardiac disease or acute cardiac  events similar to this.      Last admission was 1/26/2025 for AHRF 2/2 pulmonary edema iso ESRD vs. Pneumonia and treated for C diff due to ongoing diarrhoea (positive C diff PCR with negative toxin) also with significant abdominal pain around the transplanted kidney in setting of impaired allograft function for which transplant nephrology was consulted plan was to fu with Transplant surgery on outpatient for plans for nephrectomy Pt was discharged on 2/5/2025.     Last seen by transplant Nephro at the office on 2/24 where they recommeded maintaining  on TAC 0.5 mg BID + prednisone 5 mg once daily  CT chest/A/P per surgery  Empiric antibiotics: doxycycline 1000 mg BID x 10 days+ levofloxacin renally dosed x 5 days    Vitals at ED: afebrile, blood pressure-157/81, sat at 92% on rm air. Labs leukopenia white count 1.8<<4.0, Hb-8.8 stable,, thrombocytopenia 82<<149, RFP at his baseline with glucose of 348, negative tropes 28<<33,BNP-1042<<2810  Pt however had Bps elevated whilst in the ED 180s-212 systolic s/p Coeg 12.5mg, 13:35, 25mg at 15:17, Hydral IV 5mg then 10mg, Imdur 60mg and Nifedipine 90mg    Review of Systems: 12 point review of systems unremarkable except as stated above    ED Course:  ED Triage Vitals   Temperature Heart Rate Respirations BP   02/27/25 0721 02/27/25 0715 02/27/25 0715 02/27/25 0700   37.1 °C (98.8 °F) 66 16 157/81      Pulse Ox Temp Source Heart Rate Source Patient Position   02/27/25 0700 02/27/25 0721 02/27/25 0721 02/27/25 0721   (!) 93 % Oral Monitor Lying      BP Location FiO2 (%)     02/27/25 0721 --     Right arm          Current Vitals  Vitals:    02/27/25 1400 02/27/25 1415 02/27/25 1430 02/27/25 1445   BP: (!) 213/103 (!) 201/101 (!) 210/113 (!) 185/110   BP Location:       Patient Position:       Pulse: 71 76 74 71   Resp: (!) 22 16 (!) 21 10   Temp:       TempSrc:       SpO2: 97% 95% 99% 99%   Weight:       Height:           Labs:   CBC: WBC 1.8 , HGB 8.8, PLT 82  BMP: ,  K 4.7, Cl 100, HCO3 24, BUN 25, CR 9.60, Glu 348, Mg -1.73, Phos-3.5  LFTS: AST 16 , ALT 8, ALKPHOS 64 , TBILI 0.4 , DBILI 0.1  TROP: 28<<33  BNP: 1,042<<2  COAGS: PT 15.0 , PTT 32  , INR 1.3  UA: pending, pt reports producing very little urine  VBG: pH 7.41/39/60/0.8    EKG: Normal sinus rhythm, no DT or Twave changes, LBBB which is not new    Imaging:    XR chest 2 views  Narrative: STUDY:  Chest Radiographs;  2/27/2025 8:24AM  INDICATION:  Chest pain.  COMPARISON:  4/3/2024 XR Chest.  ACCESSION NUMBER(S):  [id number]  ORDERING CLINICIAN:  NO ELIZABETH  TECHNIQUE:  Frontal and lateral chest.   FINDINGS:  CARDIOMEDIASTINAL SILHOUETTE:  Cardiomediastinal silhouette is normal in size and configuration.     LUNGS:  Lungs are clear.     ABDOMEN:  No remarkable upper abdominal findings.     BONES:  No acute osseous changes.  Impression: No acute cardiopulmonary disease.  Signed by Jacques Valdez MD    CTAP 2/24/2024  IMPRESSION:  CHEST:  1. Interval development of bilateral ground-glass opacities, given  patient's history of renal failure, cardiomegaly, and moderate right  and small left pleural effusion these are favored to represent  pulmonary edema rather than multifocal infection however this can not  be ruled out. Repeat scan is recommended in 8-12 weeks to ensure  resolution.  2. The pulmonary artery is dilated measuring 3.5 cm in maximum  diameter, recommend correlation for pulmonary hypertension.      ABDOMEN-PELVIS:  1.  Stable appearance of transplant kidney within the left iliac  fossa with edematous appearance and an adjacent fat stranding.  2. There is a decompressed bladder with adjacent fat stranding,  recommend correlation with urinalysis for UTI.    ED Interventions:  Medications   isosorbide mononitrate ER (Imdur) 24 hr tablet 60 mg (60 mg oral Given 2/27/25 1334)   acetaminophen (Tylenol) tablet 650 mg (650 mg oral Given 2/27/25 1244)   benzonatate (Tessalon) capsule 100 mg (100 mg oral Given  2/27/25 1244)   hydrALAZINE (Apresoline) injection 5 mg (5 mg intravenous Given 2/27/25 1335)   carvedilol (Coreg) tablet 12.5 mg (12.5 mg oral Given 2/27/25 1335)   NIFEdipine ER (Adalat CC) 24 hr tablet 90 mg (90 mg oral Given 2/27/25 1335)   hydrALAZINE (Apresoline) injection 10 mg (10 mg intravenous Given 2/27/25 1456)   carvedilol (Coreg) tablet 25 mg (25 mg oral Given 2/27/25 1517)   HYDROmorphone (Dilaudid) injection 0.5 mg (0.5 mg intravenous Given 2/27/25 1518)     Scheduled medications  isosorbide mononitrate ER, 60 mg, oral, Daily      Continuous medications     PRN medications     Prior to Admission medications    Medication Sig Start Date End Date Taking? Authorizing Provider   calcitriol (Rocaltrol) 0.5 mcg capsule Take 1 capsule (0.5 mcg) by mouth once daily. 7/18/24 7/18/25  Marion Bridges MD   carvedilol (Coreg) 12.5 mg tablet Take 3 tablets (37.5 mg) by mouth 2 times a day. PATIENT NEEDS TO FOLLOW UP WITH CARDIOLOGY  Patient taking differently: Take 2 tablets (25 mg) by mouth 2 times a day. PATIENT NEEDS TO FOLLOW UP WITH CARDIOLOGY 1/21/25 3/26/25  Elma Hutton, APRN-CNP, DNP   cinacalcet (Sensipar) 30 mg tablet Take 1 tablet (30 mg) by mouth once daily. 1/8/25 3/9/25  Natalia Hancock MD   doxycycline (Adoxa) 100 mg tablet Take 1 tablet (100 mg) by mouth 2 times a day for 10 days. Take with a full glass of water and do not lie down for at least 30 minutes after 2/24/25 3/6/25  Betty Ireland MD   Easy Touch Alcohol Prep Pads pads, medicated  1/24/24   Historical Provider, MD   epoetin aida 10,000 unit/mL injection Inject 1 mL (10,000 Units) under the skin every 7 days. Do not start before April 17, 2024.  Patient not taking: Reported on 1/6/2025/6/2025 4/17/24 4/16/25  Kaycee Arroyo MD   glucagon (Gvoke HypoPen 1-Pack) 1 mg/0.2 mL auto-injector Inject 1 mg into the muscle every 15 minutes if needed (For blood glucose 41 to 70 mg/dL and no IV access). 5/18/24   Don Jain MD    imiquimod (Aldara) 5 % cream Apply 1 packet topically 3 times a week. 10/16/24 10/16/25  Shandra Grant MD   insulin glargine (Lantus) 100 unit/mL (3 mL) pen Inject 18 Units under the skin once daily in the morning. Inject 20 units daily as directed 2/5/25 3/7/25  Patricia Johnson MD   insulin lispro (HumaLOG KwikPen Insulin) 100 unit/mL injection Inject 3 Units under the skin 3 times daily (morning, midday, late afternoon). 2 units with meals plus sliding scale up to 30 units daily 1/8/25 2/7/25  Natalia Hancock MD   isosorbide mononitrate ER (Imdur) 60 mg 24 hr tablet Take 1 tablet (60 mg) by mouth once daily. 7/3/24 7/3/25  Elma Hutton APRN-CNP, DNP   ketorolac (Acular) 0.5 % ophthalmic solution Instill 1 drop into right eye three times a day FOR USE AFTER CATARACT SURGERY 1/15/25      ketorolac (Acular) 0.5 % ophthalmic solution Instill 1 drop into left eye three times a day FOR USE AFTER CATARACT SURGERY 2/11/25      lancets (Unilet Super Thin Lancets) 30 gauge misc USE TO TEST BLOOD SUGAR THREE TIMES A DAY 11/22/23   Estella Quinonez MD   lancing device misc use as directed 12/11/24   Estella Quinonez MD   levoFLOXacin (Levaquin) 250 mg tablet Take 1 tablet (250 mg) by mouth once daily for 5 days. 2/24/25 3/1/25  Betty Ireland MD   metoclopramide (Reglan) 5 mg tablet Take 1 tablet (5 mg) by mouth 3 times a day before meals. 2/24/25 4/25/25  Melia Qiu PA-C   neomycin-polymyxin-dexAMETHasone (Maxitrol) 3.5mg/mL-10,000 unit/mL-0.1 % ophthalmic suspension Instill 1 drop into right eye three times a day FOR USE AFTER CATARACT SURGERY 1/15/25      neomycin-polymyxin-dexAMETHasone (Maxitrol) 3.5mg/mL-10,000 unit/mL-0.1 % ophthalmic suspension Instill 1 drop into left eye three times a day FOR USE AFTER CATARACT SURGERY 2/11/25      NIFEdipine ER (Adalat CC) 90 mg 24 hr tablet Take 1 tablet (90 mg) by mouth 2 times a day. Do not crush, chew, or split. 1/8/25 2/7/25  Natalia Hancock MD  "  pantoprazole (ProtoNix) 40 mg EC tablet Take 1 tablet (40 mg) by mouth once daily in the morning. Take before meals. Do not crush, chew, or split. Do not start before January 22, 2024. 4/9/24 4/9/25  Raad Rolle MD   pen needle, diabetic (TechLITE Pen Needle) 32 gauge x 5/32\" needle Use 4 a day as directed 7/15/24   Estella Quinonez MD   pravastatin (Pravachol) 10 mg tablet Take 1 tablet (10 mg) by mouth once daily at bedtime. 12/26/24 6/24/25  Sumeet Bacon MD   sevelamer carbonate (Renvela) 800 mg tablet Take 1 tablet (800 mg) by mouth 3 times a day before meals. Swallow tablet whole; do not crush, break, or chew. 2/5/25   Patricia Johnson MD   syringe with needle 3 mL 25 x 5/8\" syringe Use to inject epogen. 4/30/24   Kaycee Arroyo MD   tacrolimus (Prograf) 0.5 mg capsule Take 1 capsule (0.5 mg) by mouth 2 times a day. 11/21/24 11/21/25  Betty Ireland MD   tacrolimus (Protopic) 0.1 % ointment Apply topically 2 times a day. 6/24/24 6/24/25  Vinicius Araiza MD   Unilet Super Thin Lancets 30 gauge misc 1 each 3 times a day. 11/29/23   Estella Quinonez MD   vancomycin (Firvanq) 50 mg/mL oral solution Take 2.5 mL (125 mg) by mouth every 6 hours for 8 doses. Discard remainder 2/5/25 2/20/25  Patricia Johnson MD   vitamin B complex-vitamin C-folic acid (Nephrocaps) 1 mg capsule Take 1 capsule by mouth once daily. 7/3/24 7/3/25  Elma Hutton, APRN-CNP, DNP         Cardiac Hx:  Last echo: 1/28/25 EF-55%, normal  Last cath:None on file      Past Medical History  Past Medical History:   Diagnosis Date    Anemia     Arthritis     Cataract     Chronic kidney disease, stage 3 unspecified (Multi) 09/26/2018    Stage 3 chronic kidney disease    CKD (chronic kidney disease)     stage V    Cough 02/12/2024    COVID-19 06/18/2020    COVID-19 virus infection    Diabetes (Multi)     ESRD (end stage renal disease) (Multi)     Focal and segmental proliferative glomerulonephritis 12/23/2023    HTN " (hypertension)     Hyperlipidemia     Other long term (current) drug therapy 07/20/2021    High risk medication use    Personal history of other diseases of the circulatory system     Personal history of cardiac murmur    Personal history of other infectious and parasitic diseases 08/17/2015    History of hepatitis    Polyp, colonic 08/17/2023    Primary osteoarthritis of both ankles 08/17/2023    Prostate cancer (Multi)     Tubular adenoma of colon 08/17/2023    Unspecified kidney failure 08/17/2016    Renal failure       Surgical History  Past Surgical History:   Procedure Laterality Date    ILEOSTOMY  04/25/2017    Ileostomy    ILEOSTOMY CLOSURE  08/17/2015    Ileostomy Closure    OTHER SURGICAL HISTORY  04/21/2017    Right Hemicolectomy    OTHER SURGICAL HISTORY  08/17/2015    Arteriovenous Surgery Creation Of A-V Fistula    OTHER SURGICAL HISTORY  08/17/2015    Sigmoidoscopy (Fiberoptic, Therapeutic )    PROSTATECTOMY  10/11/2013    Prostatectomy Radical    TRANSPLANT, KIDNEY, OPEN  1992    TRANSPLANT, KIDNEY, OPEN  2013    US GUIDED PERCUTANEOUS BIOPSY RENAL LEFT Left 11/20/2023    US GUIDED PERCUTANEOUS BIOPSY RENAL LEFT 11/20/2023 Lou Rodgers MD [facility]    US GUIDED PERCUTANEOUS PERITONEAL OR RETROPERITONEAL FLUID COLLECTION DRAINAGE  10/20/2022    US GUIDED PERCUTANEOUS PERITONEAL OR RETROPERITONEAL FLUID COLLECTION DRAINAGE 10/20/2022 [facility]       Social History  He reports that he has never smoked. He has been exposed to tobacco smoke. He has never used smokeless tobacco.  Drug: Oxycodone. He reports that he does not drink alcohol.     Allergies  Patient has no known allergies.    Physical Exam   Constitutional: No acute distress, cooperative  HEENT: Normocephalic, atraumatic, PERRLA, EOMI, moist mucous membranes, no pharyngeal erythema  Cardiovascular: RRR, normal S1/S2, no murmurs noted , elevated JVD  Pulmonary: Clear to auscultation b/l, no wheezes/crackles/rhonchi, no increased work of  breathing, no supplemental oxygen, no Chest wall tenderness  GI: Soft, generalised tenderness, non-distended, normoactive bowel sounds  : No fan catheter  Lower extremities: Warm and well perfused, no lower extremity edema  Neuro: A&O x3, able to move all 4 extremities spontaneously, normal gait  Psych: Appropriate mood and affect     Assessment/Plan   69 year old with ESRD from hypertension s/p 2 kidney transplants (1998 and 2013) that failed in may 2024 now on HD (T/TH/S) through LUE AVG), MGUS, T2DM, HTN, prostate cancer s/p prostatectomy, urine incontinence who presented with CP, labs concerning for leukopenia, thrombocytopenia, however patient afebrile some infective process going on in the setting of his immunosuppression.  Patient's chest pain unlikely ACS as EKG and tropes negative etiology unclear possible angina as patient's pain was relieved with nitroglycerin and brought on by exertion    #CHEST PAIN possible Angina  ::Pt with ESRD high risk for CAD,  sharp CP in sternal region, brought on by exertion and relieved w/nitroglycerin patient however no characteristic ACS chest pain, Trops and EKG however negative, no concerns for Pericarditis  ::Heart score of 4  ::ED reported tenderness on palpation of chest at presentation with concern for possible MSK pain  ::pT currently denies any CP  Plan;  -sublingual nitroglycerin prn for pain  -tylenol prn    #SOB  #Leucopenia, Thrombocytopenia possible infection vs immunosupression vs MGUS  ::Pt however with no fever, but reports feeling unwell , some vomiting which could be due to his gastroparesis  ::Pt SOB is not new  ::Hx of Kidney transplant on tacrolimus  ::hx of graft failure and chronic rejection  ::CXR with no concerns for infection  ::BNP 1042<< 2810  :: CT AP  2/24 Stable appearance of transplant kidney within the left iliac  fossa with edematous appearance and an adjacent fat stranding. There is a decompressed bladder with adjacent fat stranding,  recommend correlation with urinalysis for UTI.  ::On Empiric antibiotics: doxycycline 1000 mg BID x 10 days+ levofloxacin renally dosed x 5days (started 2/24)  Plan:  -bld c/s, CMV, BK and EBV requested  -will start Vanc and Zosyn as pt immunosuppressed  -Oxygen for support  -HD today  -will monitor and wean off oxygen  -hold heparin in platelets trending down    #Persistent abd pain iso Transplant kidney rejection  #ESRD on dialysis  #Hx of two failed kidney transplants c/b chronic rejection  #Chronic immunosuppression  ::Pt was to fu with transplant nephro for planned nephrectomy of transplanted kidney, saw Dr Williamson 2/24 who started on Abx Doxycycline and Levofloxacin  PLAN  - Transplant nephrology consulted will see in am recommend continuing prednisone 5 mg daily  - Continue tacrolimus 0.5 mg BID  - Continue home Bactrim -160 mg daily  - Tacrolimus level ordered fu in am  - low threshold for CT AP if pt worsening and with worsening abd pain  -tylenol prn for mild pain, oxy 5mg prn for moderate pain 10mg prn severe pain, can give dilaudid for break through pain  -serial abd exams    #Gastroparesis  #Nausea and vomiting  -  restarted home metoclopramide  - will monitor     #IgG and IgA lambda MGUS  #Thrombocytopenia  - Thrombocytopenia chronic, Plt today 82<<149, may be decreased in the setting of infection vs. Immunosuppression vs. MGUS  PLAN  - CTM on daily CBC  - Continue home cinacalcet 30 mg daily  - Continue home calcitriol 0.5 mcg daily     #Hx of recent cataract surgery  - Continue home eye drops:  - Maxitrol 1 drop R eye q8h    - brimonidine 2% solution 1 drop R eye BID    - ketorolac 0.5% solution 1 drop R eye q8h     # Severe HTN likely due to missed morning meds dose vs severe abd pain  ::BP-200s<<157   ::Pt reports his Bps are usually high when he goes for dialysis  Plan:  - Continue home nifedipine 90 mg bid  - c/w home carvedilol 37.5 mg bid  - c/w  Imdur 60 mg daily    #T2DM  - Most recent  A1c 9.1 12/16/24  ::Bld glucose-348  ::On lantus 18units in am  Plan:  -ISS #2   -Beta-hydroxybutyrate levels-0.10  -Lantus 10 units in am    #HLD  - Continue home pravastatin 10 mg daily     #GERD  - Continue home pantoprazole 40 mg daily     F: Replete PRN  E: Replete PRN  N: Adult diet Regular   A: PIV / Cedeño (date placed)    F : PRN  E: PRN  N: Renal diet  A: PIV   DVT ppx: Unfractionated heparin    Code Status: Full Code (confirmed on admission)   NOK:  Primary Emergency Contact: KETTY PLASENCIA Home Phone: [phone]     Pt seen and will be discussed with Attending in am  NANCY JJ MD  Internal Medicine, PGY-3

## 2025-02-27 NOTE — CONSULTS
"Vancomycin Dosing by Pharmacy- Initial    Manolo Bashir is a 69 y.o. year old male who Pharmacy has been consulted for vancomycin dosing for sepsis. Based on the patient's indication and renal status this patient is being dosed based on a goal pre-HD level of 20-25.     Renal function can be described as ESRD on Hemodialysis    Renal function is at patient's baseline      Visit Vitals  BP (!) 194/92   Pulse 65   Temp 36.1 °C (97 °F) (Temporal)   Resp 14        Lab Results   Component Value Date    CREATININE 9.60 (H) 02/27/2025    CREATININE 7.57 (H) 02/05/2025    CREATININE 11.35 (H) 02/04/2025    CREATININE 9.66 (H) 02/03/2025        Patient weight is No results found for: \"PTWEIGHT\"    Lab Results   Component Value Date    VANCORANDOM 27.8 (H) 09/11/2024    VANCORANDOM 21.3 (H) 05/16/2024    VANCORANDOM 15.5 05/12/2024        No intake/output data recorded.    Lab Results   Component Value Date    PATIENTTEMP 37.0 02/27/2025    PATIENTTEMP 37.0 01/26/2025    PATIENTTEMP 37.0 09/10/2024          Assessment/Plan    Patient will be given a loading dose of 1750 mg. (25mg/kg per protocol).    Follow-up level will be ordered on 3/1 at AM labs (before next HD) unless clinically indicated sooner.  Will continue to monitor renal function daily while on vancomycin and order serum creatinine at least every 48 hours if not already ordered.  Follow for continued vancomycin needs, clinical response, and signs/symptoms of toxicity.     Thank you for allowing me to participate in the care of this patient.     Margarita Guerra, PharmD             "

## 2025-02-28 ENCOUNTER — APPOINTMENT (OUTPATIENT)
Dept: DIALYSIS | Facility: HOSPITAL | Age: 70
End: 2025-02-28
Payer: MEDICARE

## 2025-02-28 LAB
ALBUMIN SERPL BCP-MCNC: 3.2 G/DL (ref 3.4–5)
ANION GAP SERPL CALC-SCNC: 11 MMOL/L (ref 10–20)
BKV DNA SERPL NAA+PROBE-LOG#: NORMAL {LOG_COPIES}/ML
BUN SERPL-MCNC: 13 MG/DL (ref 6–23)
CALCIUM SERPL-MCNC: 8.8 MG/DL (ref 8.6–10.6)
CHLORIDE SERPL-SCNC: 95 MMOL/L (ref 98–107)
CO2 SERPL-SCNC: 34 MMOL/L (ref 21–32)
CREAT SERPL-MCNC: 6.79 MG/DL (ref 0.5–1.3)
CRP SERPL-MCNC: 1.88 MG/DL
EGFRCR SERPLBLD CKD-EPI 2021: 8 ML/MIN/1.73M*2
ERYTHROCYTE [DISTWIDTH] IN BLOOD BY AUTOMATED COUNT: 15.6 % (ref 11.5–14.5)
FERRITIN SERPL-MCNC: 1564 NG/ML (ref 20–300)
FLUAV RNA RESP QL NAA+PROBE: NOT DETECTED
FLUBV RNA RESP QL NAA+PROBE: NOT DETECTED
GLUCOSE BLD MANUAL STRIP-MCNC: 177 MG/DL (ref 74–99)
GLUCOSE BLD MANUAL STRIP-MCNC: 199 MG/DL (ref 74–99)
GLUCOSE BLD MANUAL STRIP-MCNC: 209 MG/DL (ref 74–99)
GLUCOSE BLD MANUAL STRIP-MCNC: 248 MG/DL (ref 74–99)
GLUCOSE SERPL-MCNC: 232 MG/DL (ref 74–99)
HAPTOGLOB SERPL NEPH-MCNC: <30 MG/DL (ref 30–200)
HCT VFR BLD AUTO: 29 % (ref 41–52)
HGB BLD-MCNC: 9.4 G/DL (ref 13.5–17.5)
LABORATORY COMMENT REPORT: NOT DETECTED
LDH SERPL L TO P-CCNC: 239 U/L (ref 84–246)
MCH RBC QN AUTO: 27.6 PG (ref 26–34)
MCHC RBC AUTO-ENTMCNC: 32.4 G/DL (ref 32–36)
MCV RBC AUTO: 85 FL (ref 80–100)
NRBC BLD-RTO: 0 /100 WBCS (ref 0–0)
PHOSPHATE SERPL-MCNC: 3.4 MG/DL (ref 2.5–4.9)
PLATELET # BLD AUTO: 86 X10*3/UL (ref 150–450)
POTASSIUM SERPL-SCNC: 4.4 MMOL/L (ref 3.5–5.3)
PROCALCITONIN SERPL-MCNC: 0.28 NG/ML
RBC # BLD AUTO: 3.41 X10*6/UL (ref 4.5–5.9)
SARS-COV-2 RNA RESP QL NAA+PROBE: NOT DETECTED
SODIUM SERPL-SCNC: 136 MMOL/L (ref 136–145)
TACROLIMUS BLD-MCNC: 2.4 NG/ML
WBC # BLD AUTO: 2.6 X10*3/UL (ref 4.4–11.3)

## 2025-02-28 PROCEDURE — 2500000005 HC RX 250 GENERAL PHARMACY W/O HCPCS

## 2025-02-28 PROCEDURE — 2500000001 HC RX 250 WO HCPCS SELF ADMINISTERED DRUGS (ALT 637 FOR MEDICARE OP)

## 2025-02-28 PROCEDURE — 82947 ASSAY GLUCOSE BLOOD QUANT: CPT

## 2025-02-28 PROCEDURE — 2500000004 HC RX 250 GENERAL PHARMACY W/ HCPCS (ALT 636 FOR OP/ED)

## 2025-02-28 PROCEDURE — 90935 HEMODIALYSIS ONE EVALUATION: CPT | Performed by: INTERNAL MEDICINE

## 2025-02-28 PROCEDURE — 84145 PROCALCITONIN (PCT): CPT

## 2025-02-28 PROCEDURE — 87636 SARSCOV2 & INF A&B AMP PRB: CPT

## 2025-02-28 PROCEDURE — 2500000002 HC RX 250 W HCPCS SELF ADMINISTERED DRUGS (ALT 637 FOR MEDICARE OP, ALT 636 FOR OP/ED)

## 2025-02-28 PROCEDURE — 82728 ASSAY OF FERRITIN: CPT

## 2025-02-28 PROCEDURE — 36415 COLL VENOUS BLD VENIPUNCTURE: CPT

## 2025-02-28 PROCEDURE — 87385 HISTOPLASMA CAPSUL AG IA: CPT | Performed by: INTERNAL MEDICINE

## 2025-02-28 PROCEDURE — 80069 RENAL FUNCTION PANEL: CPT

## 2025-02-28 PROCEDURE — 1210000001 HC SEMI-PRIVATE ROOM DAILY

## 2025-02-28 PROCEDURE — 99222 1ST HOSP IP/OBS MODERATE 55: CPT | Performed by: INTERNAL MEDICINE

## 2025-02-28 PROCEDURE — 83615 LACTATE (LD) (LDH) ENZYME: CPT

## 2025-02-28 PROCEDURE — 86140 C-REACTIVE PROTEIN: CPT | Performed by: INTERNAL MEDICINE

## 2025-02-28 PROCEDURE — 87449 NOS EACH ORGANISM AG IA: CPT

## 2025-02-28 PROCEDURE — 85027 COMPLETE CBC AUTOMATED: CPT

## 2025-02-28 PROCEDURE — 80197 ASSAY OF TACROLIMUS: CPT

## 2025-02-28 PROCEDURE — 2500000005 HC RX 250 GENERAL PHARMACY W/O HCPCS: Performed by: NUTRITIONIST

## 2025-02-28 PROCEDURE — 2500000001 HC RX 250 WO HCPCS SELF ADMINISTERED DRUGS (ALT 637 FOR MEDICARE OP): Performed by: NUTRITIONIST

## 2025-02-28 RX ORDER — VANCOMYCIN HYDROCHLORIDE 1 G/200ML
INJECTION, SOLUTION INTRAVENOUS
Status: DISPENSED
Start: 2025-02-28 | End: 2025-02-28

## 2025-02-28 RX ORDER — ONDANSETRON HYDROCHLORIDE 2 MG/ML
4 INJECTION, SOLUTION INTRAVENOUS EVERY 8 HOURS PRN
Status: DISCONTINUED | OUTPATIENT
Start: 2025-02-28 | End: 2025-02-28

## 2025-02-28 RX ORDER — VANCOMYCIN HYDROCHLORIDE 750 MG/150ML
INJECTION, SOLUTION INTRAVENOUS
Status: COMPLETED
Start: 2025-02-28 | End: 2025-02-28

## 2025-02-28 RX ORDER — LIDOCAINE 560 MG/1
2 PATCH PERCUTANEOUS; TOPICAL; TRANSDERMAL DAILY
Status: DISCONTINUED | OUTPATIENT
Start: 2025-02-28 | End: 2025-03-06 | Stop reason: HOSPADM

## 2025-02-28 RX ORDER — DOXYCYCLINE HYCLATE 100 MG
100 TABLET ORAL 2 TIMES DAILY
Status: DISCONTINUED | OUTPATIENT
Start: 2025-02-28 | End: 2025-03-03

## 2025-02-28 RX ORDER — ACETAMINOPHEN 325 MG/1
3 TABLET ORAL EVERY 6 HOURS PRN
Status: ON HOLD | COMMUNITY

## 2025-02-28 RX ORDER — BENZONATATE 100 MG/1
100 CAPSULE ORAL 3 TIMES DAILY PRN
Status: DISCONTINUED | OUTPATIENT
Start: 2025-02-28 | End: 2025-03-06 | Stop reason: HOSPADM

## 2025-02-28 RX ORDER — LEVOFLOXACIN 500 MG/1
500 TABLET, FILM COATED ORAL
Status: DISCONTINUED | OUTPATIENT
Start: 2025-02-28 | End: 2025-03-03

## 2025-02-28 RX ORDER — ACETAMINOPHEN 325 MG/1
975 TABLET ORAL 3 TIMES DAILY
Status: DISCONTINUED | OUTPATIENT
Start: 2025-02-28 | End: 2025-03-06 | Stop reason: HOSPADM

## 2025-02-28 RX ORDER — ONDANSETRON 4 MG/1
4 TABLET, ORALLY DISINTEGRATING ORAL EVERY 12 HOURS PRN
Status: DISCONTINUED | OUTPATIENT
Start: 2025-02-28 | End: 2025-02-28

## 2025-02-28 RX ORDER — CEFEPIME 1 G/50ML
1 INJECTION, SOLUTION INTRAVENOUS EVERY 24 HOURS
Status: DISCONTINUED | OUTPATIENT
Start: 2025-02-28 | End: 2025-03-05

## 2025-02-28 RX ORDER — PREDNISONE 5 MG/1
TABLET ORAL DAILY
COMMUNITY
End: 2025-03-06 | Stop reason: HOSPADM

## 2025-02-28 RX ADMIN — METOCLOPRAMIDE 5 MG: 10 TABLET ORAL at 12:37

## 2025-02-28 RX ADMIN — NIFEDIPINE 90 MG: 90 TABLET, FILM COATED, EXTENDED RELEASE ORAL at 11:57

## 2025-02-28 RX ADMIN — SEVELAMER CARBONATE 800 MG: 800 TABLET, FILM COATED ORAL at 17:49

## 2025-02-28 RX ADMIN — HEPARIN SODIUM 5000 UNITS: 5000 INJECTION, SOLUTION INTRAVENOUS; SUBCUTANEOUS at 06:09

## 2025-02-28 RX ADMIN — VANCOMYCIN HYDROCHLORIDE 750 MG: 750 INJECTION, SOLUTION INTRAVENOUS at 02:23

## 2025-02-28 RX ADMIN — TACROLIMUS 0.5 MG: 0.5 CAPSULE ORAL at 17:49

## 2025-02-28 RX ADMIN — ISOSORBIDE MONONITRATE 60 MG: 30 TABLET, EXTENDED RELEASE ORAL at 11:58

## 2025-02-28 RX ADMIN — DOXYCYCLINE HYCLATE 100 MG: 100 TABLET, COATED ORAL at 11:58

## 2025-02-28 RX ADMIN — METOCLOPRAMIDE 5 MG: 10 TABLET ORAL at 17:49

## 2025-02-28 RX ADMIN — ACETAMINOPHEN 975 MG: 325 TABLET ORAL at 21:00

## 2025-02-28 RX ADMIN — TACROLIMUS 0.5 MG: 0.5 CAPSULE ORAL at 06:08

## 2025-02-28 RX ADMIN — CINACALCET HYDROCHLORIDE 30 MG: 30 TABLET, FILM COATED ORAL at 11:57

## 2025-02-28 RX ADMIN — CARVEDILOL 37.5 MG: 12.5 TABLET, FILM COATED ORAL at 21:01

## 2025-02-28 RX ADMIN — LIDOCAINE 4% 2 PATCH: 40 PATCH TOPICAL at 11:57

## 2025-02-28 RX ADMIN — PRAVASTATIN SODIUM 10 MG: 20 TABLET ORAL at 21:20

## 2025-02-28 RX ADMIN — INSULIN LISPRO 4 UNITS: 100 INJECTION, SOLUTION INTRAVENOUS; SUBCUTANEOUS at 17:50

## 2025-02-28 RX ADMIN — SEVELAMER CARBONATE 800 MG: 800 TABLET, FILM COATED ORAL at 11:58

## 2025-02-28 RX ADMIN — CARVEDILOL 37.5 MG: 12.5 TABLET, FILM COATED ORAL at 11:57

## 2025-02-28 RX ADMIN — ACETAMINOPHEN 975 MG: 325 TABLET ORAL at 12:04

## 2025-02-28 RX ADMIN — NIFEDIPINE 90 MG: 90 TABLET, FILM COATED, EXTENDED RELEASE ORAL at 21:01

## 2025-02-28 RX ADMIN — PREDNISONE 5 MG: 5 TABLET ORAL at 11:58

## 2025-02-28 RX ADMIN — CEFEPIME 1 G: 1 INJECTION, SOLUTION INTRAVENOUS at 21:02

## 2025-02-28 RX ADMIN — OXYCODONE 10 MG: 5 TABLET ORAL at 10:59

## 2025-02-28 RX ADMIN — NEOMYCIN SULFATE, POLYMYXIN B SULFATE AND DEXAMETHASONE 1 DROP: 3.5; 10000; 1 SUSPENSION OPHTHALMIC at 06:09

## 2025-02-28 RX ADMIN — KETOROLAC TROMETHAMINE 1 DROP: 5 SOLUTION OPHTHALMIC at 06:09

## 2025-02-28 RX ADMIN — RENO CAPS 1 CAPSULE: 100; 1.5; 1.7; 20; 10; 1; 150; 5; 6 CAPSULE ORAL at 11:57

## 2025-02-28 ASSESSMENT — PAIN SCALES - GENERAL
PAINLEVEL_OUTOF10: 0 - NO PAIN
PAINLEVEL_OUTOF10: 0 - NO PAIN

## 2025-02-28 ASSESSMENT — ACTIVITIES OF DAILY LIVING (ADL)
DRESSING YOURSELF: INDEPENDENT
PATIENT'S MEMORY ADEQUATE TO SAFELY COMPLETE DAILY ACTIVITIES?: YES
FEEDING YOURSELF: INDEPENDENT
HEARING - RIGHT EAR: FUNCTIONAL
ADEQUATE_TO_COMPLETE_ADL: YES
BATHING: INDEPENDENT
TOILETING: INDEPENDENT
HEARING - LEFT EAR: FUNCTIONAL
JUDGMENT_ADEQUATE_SAFELY_COMPLETE_DAILY_ACTIVITIES: YES
GROOMING: INDEPENDENT
WALKS IN HOME: INDEPENDENT

## 2025-02-28 ASSESSMENT — PAIN - FUNCTIONAL ASSESSMENT
PAIN_FUNCTIONAL_ASSESSMENT: NO/DENIES PAIN
PAIN_FUNCTIONAL_ASSESSMENT: 0-10
PAIN_FUNCTIONAL_ASSESSMENT: NO/DENIES PAIN

## 2025-02-28 NOTE — PROGRESS NOTES
Vancomycin Dosing by Pharmacy- Cessation of Therapy    Consult to pharmacy for vancomycin dosing has been discontinued by the prescriber, pharmacy will sign off at this time.    Please call pharmacy if there are further questions or re-enter a consult if vancomycin is resumed.     Jean Persaud, [facility]

## 2025-02-28 NOTE — PROCEDURES
"DIALYSIS NOTE:    Seen in hemodialysis, undergoing isolated ultrafiltration for 2 hours, fluid removal goal 2 liters (keep SBP> 100 mmHg).     /76   Pulse 75   Temp 36.6 °C (97.8 °F) (Temporal)   Resp (!) 21   Ht 1.727 m (5' 8\")   Wt 68 kg (150 lb)   SpO2 100%   BMI 22.81 kg/m²       Additional Recommendations:  Plan routine IHD tomorrow.    Scheduled medications  acetaminophen, 975 mg, oral, TID  carvedilol, 37.5 mg, oral, BID  cinacalcet, 30 mg, oral, Daily  doxycylcine, 100 mg, oral, BID  [START ON 3/1/2025] epoetin aida or biosimilar, 10,000 Units, intravenous, Once per day on Tuesday Thursday Saturday  heparin (porcine), 5,000 Units, subcutaneous, q8h ZOË  insulin glargine, 10 Units, subcutaneous, q AM  insulin lispro, 0-10 Units, subcutaneous, TID AC  isosorbide mononitrate ER, 60 mg, oral, Daily  ketorolac, 1 drop, Right Eye, q8h  levoFLOXacin, 500 mg, oral, q48h  lidocaine, 2 patch, transdermal, Daily  metoclopramide, 5 mg, oral, TID AC  neomycin-polymyxin-dexAMETHasone, 1 drop, Right Eye, q8h ZOË  NIFEdipine ER, 90 mg, oral, BID  pantoprazole, 40 mg, oral, Daily before breakfast  polyethylene glycol, 17 g, oral, Daily  pravastatin, 10 mg, oral, Nightly  predniSONE, 5 mg, oral, Daily  sevelamer carbonate, 800 mg, oral, TID AC  tacrolimus, 0.5 mg, oral, q12h ZOË  vitamin B complex-vitamin C-folic acid, 1 capsule, oral, Daily      Continuous medications     PRN medications  PRN medications: dextrose, dextrose, glucagon, glucagon, nitroglycerin, oxyCODONE, oxyCODONE, sodium chloride    Recent Results (from the past 24 hours)   Blood Culture    Collection Time: 02/27/25  8:03 PM    Specimen: Peripheral Venipuncture; Blood culture   Result Value Ref Range    Blood Culture Loaded on Instrument - Culture in progress    Blood Culture    Collection Time: 02/27/25  8:03 PM    Specimen: Peripheral Venipuncture; Blood culture   Result Value Ref Range    Blood Culture Loaded on Instrument - Culture in " progress    Mildred-Barr Virus Antibody Panel (VCA IgG/IgM, EA IgG, NA IgG)    Collection Time: 02/27/25  8:03 PM   Result Value Ref Range    EBV VCA, IgG  Positive (A) Negative    EBV VCA, IgM  Negative Negative    EBV Early Antigen Antibody, IgG Positive (A) Negative    EBV Nuclear Antigen Antibody, IgG Positive (A) Negative   Cytomegalovirus IgG    Collection Time: 02/27/25  8:03 PM   Result Value Ref Range    Cytomegalovirus IgG Reactive (A) Nonreactive   POCT GLUCOSE    Collection Time: 02/27/25  8:55 PM   Result Value Ref Range    POCT Glucose 149 (H) 74 - 99 mg/dL   POCT GLUCOSE    Collection Time: 02/27/25 11:11 PM   Result Value Ref Range    POCT Glucose 250 (H) 74 - 99 mg/dL   CBC    Collection Time: 02/28/25  6:48 AM   Result Value Ref Range    WBC 2.6 (L) 4.4 - 11.3 x10*3/uL    nRBC 0.0 0.0 - 0.0 /100 WBCs    RBC 3.41 (L) 4.50 - 5.90 x10*6/uL    Hemoglobin 9.4 (L) 13.5 - 17.5 g/dL    Hematocrit 29.0 (L) 41.0 - 52.0 %    MCV 85 80 - 100 fL    MCH 27.6 26.0 - 34.0 pg    MCHC 32.4 32.0 - 36.0 g/dL    RDW 15.6 (H) 11.5 - 14.5 %    Platelets 86 (L) 150 - 450 x10*3/uL   Renal function panel    Collection Time: 02/28/25  6:48 AM   Result Value Ref Range    Glucose 232 (H) 74 - 99 mg/dL    Sodium 136 136 - 145 mmol/L    Potassium 4.4 3.5 - 5.3 mmol/L    Chloride 95 (L) 98 - 107 mmol/L    Bicarbonate 34 (H) 21 - 32 mmol/L    Anion Gap 11 10 - 20 mmol/L    Urea Nitrogen 13 6 - 23 mg/dL    Creatinine 6.79 (H) 0.50 - 1.30 mg/dL    eGFR 8 (L) >60 mL/min/1.73m*2    Calcium 8.8 8.6 - 10.6 mg/dL    Phosphorus 3.4 2.5 - 4.9 mg/dL    Albumin 3.2 (L) 3.4 - 5.0 g/dL   Tacrolimus level    Collection Time: 02/28/25  6:48 AM   Result Value Ref Range    Tacrolimus  2.4 <=15.0 ng/mL   POCT GLUCOSE    Collection Time: 02/28/25  7:12 AM   Result Value Ref Range    POCT Glucose 199 (H) 74 - 99 mg/dL   POCT GLUCOSE    Collection Time: 02/28/25 12:47 PM   Result Value Ref Range    POCT Glucose 209 (H) 74 - 99 mg/dL   Sars-CoV-2 and  Influenza A/B PCR    Collection Time: 02/28/25  1:28 PM   Result Value Ref Range    Flu A Result Not Detected Not Detected    Flu B Result Not Detected Not Detected    Coronavirus 2019, PCR Not Detected Not Detected

## 2025-02-28 NOTE — NURSING NOTE
Report to Receiving RN:    Report To: Dianne  Time Report Called: [id number]  Hand-Off Communication: Tolerated HD Tx well. Removed 2.0 L. Post VS:118/66, 63  Complications During Treatment: No  Ultrafiltration Treatment: Yes  Medications Administered During Dialysis: No  Blood Products Administered During Dialysis: N/A  Labs Sent During Dialysis: N/A  Heparin Drip Rate Changes: N/A  Dialysis Catheter Dressing: N/A  Last Dressing Change: N/A      Last Updated: 3:55 PM by MISSY PUGH

## 2025-02-28 NOTE — PROGRESS NOTES
Pharmacy Medication History Review    Manolo Bashir is a 69 y.o. male admitted for Acute chest pain. Pharmacy reviewed the patient's [id number]-fu-aweqzsdnb medications and allergies for accuracy.    Medications ADDED:  Tylenol  Prednisone   Medications CHANGED:  N/A  Medications REMOVED/NOT TAKING:    Epoetin  Protopic     The list below reflects the updated PTA list.   Prior to Admission Medications   Prescriptions Last Dose Informant   Easy Touch Alcohol Prep Pads pads, medicated  Friend   NIFEdipine ER (Adalat CC) 90 mg 24 hr tablet 2025 Morning    Sig: Take 1 tablet (90 mg) by mouth 2 times a day. Do not crush, chew, or split.   Unilet Super Thin Lancets 30 gauge misc  Friend   Si each 3 times a day.   acetaminophen (Tylenol) 325 mg tablet 2025 Evening Friend   Sig: Take 3 tablets (975 mg) by mouth every 6 hours if needed for mild pain (1 - 3).   calcitriol (Rocaltrol) 0.5 mcg capsule 2025 Morning Friend   Sig: Take 1 capsule (0.5 mcg) by mouth once daily.   carvedilol (Coreg) 12.5 mg tablet 2025 Evening Friend   Sig: Take 3 tablets (37.5 mg) by mouth 2 times a day. PATIENT NEEDS TO FOLLOW UP WITH CARDIOLOGY   Patient taking differently: Take 2 tablets (25 mg) by mouth 2 times a day. PATIENT NEEDS TO FOLLOW UP WITH CARDIOLOGY   cinacalcet (Sensipar) 30 mg tablet  Friend   Sig: Take 1 tablet (30 mg) by mouth once daily.  Pt not taking med currently, waiting for instructions from dialysis.    doxycycline (Adoxa) 100 mg tablet 2025 Evening Friend   Sig: Take 1 tablet (100 mg) by mouth 2 times a day for 10 days. Take with a full glass of water and do not lie down for at least 30 minutes after  Pt has been taking med for about 3 days.    epoetin aida 10,000 unit/mL injection Not Taking Friend   Sig: Inject 1 mL (10,000 Units) under the skin every 7 days. Do not start before 2024.   Patient not taking: Reported on 2025   glucagon (Gvoke HypoPen 1-Pack) 1 mg/0.2 mL auto-injector   Friend   Sig: Inject 1 mg into the muscle every 15 minutes if needed (For blood glucose 41 to 70 mg/dL and no IV access).   imiquimod (Aldara) 5 % cream Past Week Friend   Sig: Apply 1 packet topically 3 times a week.   insulin glargine (Lantus) 100 unit/mL (3 mL) pen 2/26/2025 Morning Friend   Sig: Inject 18 Units under the skin once daily in the morning. Inject 20 units daily as directed   insulin lispro (HumaLOG KwikPen Insulin) 100 unit/mL injection 2/26/2025 Morning    Sig: Inject 3 Units under the skin 3 times daily (morning, midday, late afternoon). 2 units with meals plus sliding scale up to 30 units daily   isosorbide mononitrate ER (Imdur) 60 mg 24 hr tablet 2/26/2025 Morning Friend   Sig: Take 1 tablet (60 mg) by mouth once daily.   ketorolac (Acular) 0.5 % ophthalmic solution Not Taking Friend   Sig: Instill 1 drop into right eye three times a day FOR USE AFTER CATARACT SURGERY   Patient not taking: Reported on 2/28/2025   ketorolac (Acular) 0.5 % ophthalmic solution 2/26/2025 Evening Friend   Sig: Instill 1 drop into left eye three times a day FOR USE AFTER CATARACT SURGERY   Patient taking differently: Administer 1 drop into the left eye 3 times a day.   lancets (Unilet Super Thin Lancets) 30 gauge misc  Friend   Sig: USE TO TEST BLOOD SUGAR THREE TIMES A DAY   lancing device misc  Friend   Sig: use as directed   levoFLOXacin (Levaquin) 250 mg tablet 2/26/2025 Morning Friend   Sig: Take 1 tablet (250 mg) by mouth once daily for 5 days.   metoclopramide (Reglan) 5 mg tablet 2/26/2025 Evening Friend   Sig: Take 1 tablet (5 mg) by mouth 3 times a day before meals.   neomycin-polymyxin-dexAMETHasone (Maxitrol) 3.5mg/mL-10,000 unit/mL-0.1 % ophthalmic suspension Not Taking Friend   Sig: Instill 1 drop into right eye three times a day FOR USE AFTER CATARACT SURGERY   Patient not taking: Reported on 2/28/2025   neomycin-polymyxin-dexAMETHasone (Maxitrol) 3.5mg/mL-10,000 unit/mL-0.1 % ophthalmic suspension  "2/26/2025 Evening Friend   Sig: Instill 1 drop into left eye three times a day FOR USE AFTER CATARACT SURGERY   Patient taking differently: Administer 1 drop into the left eye 3 times a day.   pantoprazole (ProtoNix) 40 mg EC tablet 2/26/2025 Morning Friend   Sig: Take 1 tablet (40 mg) by mouth once daily in the morning. Take before meals. Do not crush, chew, or split. Do not start before January 22, 2024.   pen needle, diabetic (TechLITE Pen Needle) 32 gauge x 5/32\" needle  Friend   Sig: Use 4 a day as directed   pravastatin (Pravachol) 10 mg tablet 2/26/2025 Evening Friend   Sig: Take 1 tablet (10 mg) by mouth once daily at bedtime.   predniSONE (Deltasone) 5 mg tablet 2/26/2025 Morning Friend   Sig: Take by mouth once daily. Take 8 tablets (40 mg) by mouth once daily for 5 days, THEN 4 tablets (20 mg) once daily for 5 days, THEN 2 tablets (10 mg) once daily for 5 days, THEN 1 tablet (5 mg) once daily.  Pt is taking one tab once daily.    sevelamer carbonate (Renvela) 800 mg tablet  Friend   Sig: Take 1 tablet (800 mg) by mouth 3 times a day before meals. Swallow tablet whole; do not crush, break, or chew.  Pt not taking med currently, waiting for instructions from dialysis.    syringe with needle 3 mL 25 x 5/8\" syringe  Friend   Sig: Use to inject epogen.   tacrolimus (Prograf) 0.5 mg capsule 2/26/2025 Evening Friend   Sig: Take 1 capsule (0.5 mg) by mouth 2 times a day.   tacrolimus (Protopic) 0.1 % ointment Not Taking Friend   Sig: Apply topically 2 times a day.   Patient not taking: Reported on 2/28/2025   vancomycin (Firvanq) 50 mg/mL oral solution Not Taking    Sig: Take 2.5 mL (125 mg) by mouth every 6 hours for 8 doses. Discard remainder   Patient not taking: Reported on 2/28/2025   vitamin B complex-vitamin C-folic acid (Nephrocaps) 1 mg capsule 2/26/2025 Morning Friend   Sig: Take 1 capsule by mouth once daily.      Facility-Administered Medications: None        The list below reflects the updated " "allergy list. Please review each documented allergy for additional clarification and justification.  Allergies  Reviewed by Breanna Phoenix on 2/28/2025   No Known Allergies         Patient accepts M2B at discharge.     Sources:   [facility]  Pharmacy dispense history  Patient interview Moderate historian  Friend  Chart Review     Additional Comments:  Pts friend was at bedside, she was able to provide me with a list of his meds and tell me the last dosages taken at home.   Pt was able to provide some details.       MORIAH OLIVEIRA PHOENIX  Pharmacy Technician  02/28/25     Secure Chat preferred   If no response call [phone] or [facility] \"Med Rec\"   "

## 2025-02-28 NOTE — NURSING NOTE
Report from Sending RN:    Report From: Aleja  Recent Surgery of Procedure: No  Baseline Level of Consciousness (LOC): A&Ox4  Oxygen Use: Yes, Not necessary for O2 sat (97%) but pt stated it made him feel better --NC 2lpm  Type: NC 2 lpm  Diabetic: Yes, last glucose 199 - no coverage.  Waiting for breakfast. Still.  Last BP Med Given Day of Dialysis: na - waiting until after pt eats.  Last Pain Med Given: oxyCodone 10 mg 1059  Lab Tests to be Obtained with Dialysis: No  Blood Transfusion to be Given During Dialysis: No  Available IV Access: Yes, #20 RFA  Medications to be Administered During Dialysis: No  Continuous IV Infusion Running: No  Restraints on Currently or in the Last 24 Hours: No  Hand-Off Communication: A&O, recently medicated for generalized pain.  Only complaint now is hunger - awaiting breakfast tray.  Dialysis Catheter Dressing: na - SPENSER AVF  Last Dressing Change: na

## 2025-02-28 NOTE — PROGRESS NOTES
"Manolo Bashir is a 69 y.o. male on day 1 of admission presenting with Acute chest pain.      Subjective   Pt complaining of abdominal pain, back, pain, and neck pain. Chronic neck/back pain helped with lidocaine patches. Abdominal pain feels like kidney pain per pt and like his \"kidney needs to go.\" Pt uncomfortable in the hallway and concerned about bp increasing on dialysis days though he states he holds his bp meds on morning of dialysis. He agreed to fluid removal 2/28. Pt complaining of sob improved with oxygen though his sats remained high 90s-100% on and off oxygen. Denied nausea and vomiting.     Objective     Last Recorded Vitals  /76   Pulse 70   Temp 36.6 °C (97.9 °F)   Resp (!) 21   Wt 68 kg (150 lb)   SpO2 100%   Intake/Output last 3 Shifts:    Intake/Output Summary (Last 24 hours) at 2/28/2025 0937  Last data filed at 2/27/2025 1951  Gross per 24 hour   Intake 1000 ml   Output 2400 ml   Net -1400 ml       Admission Weight  Weight: 68 kg (150 lb) (02/27/25 0721)    Daily Weight  02/27/25 : 68 kg (150 lb)    Image Results  ECG 12 lead  Normal sinus rhythm  Right axis deviation  Nonspecific intraventricular block  Abnormal ECG  When compared with ECG of 31-JAN-2025 12:19,  QRS axis Shifted right    See ED provider note for full interpretation and clinical correlation  Confirmed by Millicent Sheikh ([id number]) on 2/27/2025 10:50:01 PM  ECG 12 Lead  Normal sinus rhythm  Right axis deviation  Nonspecific intraventricular block  Abnormal ECG  When compared with ECG of 27-FEB-2025 06:58,  No significant change was found    See ED provider note for full interpretation and clinical correlation  Confirmed by Millicent Sheikh ([id number]) on 2/27/2025 10:49:54 PM  ECG 12 lead  Normal sinus rhythm  Left axis deviation  Left bundle branch block  Abnormal ECG  When compared with ECG of 27-FEB-2025 06:59,  QRS axis Shifted left  Nonspecific T wave abnormality now evident in Lateral leads    See ED provider note for full " interpretation and clinical correlation  Confirmed by Millicent Sheikh ([id number]) on 2/27/2025 10:49:41 PM  XR chest 2 views  Narrative: STUDY:  Chest Radiographs;  2/27/2025 8:24AM  INDICATION:  Chest pain.  COMPARISON:  4/3/2024 XR Chest.  ACCESSION NUMBER(S):  [id number]  ORDERING CLINICIAN:  NO ELIZABETH  TECHNIQUE:  Frontal and lateral chest.   FINDINGS:  CARDIOMEDIASTINAL SILHOUETTE:  Cardiomediastinal silhouette is normal in size and configuration.     LUNGS:  Lungs are clear.     ABDOMEN:  No remarkable upper abdominal findings.     BONES:  No acute osseous changes.  Impression: No acute cardiopulmonary disease.  Signed by Jacques Valdez MD      Physical Exam  Constitutional:       General: He is in acute distress.      Appearance: He is toxic-appearing.   HENT:      Head: Normocephalic and atraumatic.      Nose: Rhinorrhea present.   Eyes:      General:         Right eye: No discharge.         Left eye: No discharge.      Extraocular Movements: Extraocular movements intact.   Cardiovascular:      Rate and Rhythm: Normal rate.      Heart sounds: No murmur heard.     No friction rub. No gallop.   Pulmonary:      Effort: No respiratory distress.      Breath sounds: No wheezing or rhonchi.      Comments: Increased wob on ra   Chest:      Chest wall: Tenderness present.   Abdominal:      General: Abdomen is flat. Bowel sounds are normal.      Palpations: Abdomen is soft.      Tenderness: There is abdominal tenderness.   Musculoskeletal:         General: No tenderness.      Right lower leg: No edema.      Left lower leg: No edema.      Comments: Nodules on b/l arms, r arm avf   Skin:     General: Skin is warm and dry.   Neurological:      Mental Status: He is alert and oriented to person, place, and time. Mental status is at baseline.   Psychiatric:         Mood and Affect: Mood normal.         Behavior: Behavior normal.      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  Patient's chest pain unlikely ACS as EKG and trops negative but responded to nitroglycerin and aspirin. BNP elevated and chest tender to palpation on exam.  CT and exam showing fluid up, so pt went to dialysis 2/28 fluid removal, rsv/covid/flu neg, pt with cough +/- productive, ctm after fluid removal for improvement in resp status.         Updates:  -rsv/flu/covid  neg  -sched tylenol and scheduled lidocaine patches for neck, chest, and back pain   -carb controlled renal diet   - qtc 463  -fluid off with dialysis nephro 2/28, normal HD T/Th/Sa sched with left arm AVF, continue inpt  -Transplant neph following   -tessalon pearls started for cough +/- productive   -ns spray started   -chest wall tender to palpation, ctm   -procal pending  -dc vanc zosyn, continue outpt doxy and levofloxacin  -monitor of decreased lantus and ssi, ctm consider restart home insulin reg   -for htn continue home meds and monitor blood pressure. Can use hydralazine 25 mg p.o. as needed for SBP more than 170.  -crp 1.88 elevated   -fu cultures, Unable to check Legionella/strep pneumo as patient is oliguric to anuric, so fu serum histoplasma antigen though low concern.  - pt stopped calcitriol outpt per HD nephrologist       #Chest Pain, 2/2 Edema vs Resp Infection, improving   #HFpEF, diastolic dysfunction  #Cough  #SOB  ::Pt with ESRD high risk for CAD,  sharp CP in sternal region, brought on by exertion and relieved w/nitroglycerin patient however no characteristic ACS chest pain, Trops and EKG however negative, no concerns for Pericarditis, trops neg, bnp  elevated, afebrile, leukopenia, immunocompromise, recent steroid burst with taper, still on tacrolimus currently.  Has been on levofloxacin and doxycycline past few days, prescribed outpatient by transplant nephrology.  ::Heart score of 4  ::ED reported tenderness on palpation of chest at presentation with concern for possible MSK pain  ::pT currently denies any CP  Plan;  -sublingual nitroglycerin prn for pain  -rsv/flu/covid  neg  -sched tylenol and scheduled lidocaine patches for neck, chest, and back pain   -prev nausea and vomiting, reglan qtc 463  -fluid off with dialysis nephro 2/28, normal HD T/Th/Sa sched with left arm AVF, continue inpt  -tessalon pearls started for cough +/- productive   -ns spray started   -chest wall tender to palpation, ctm   -procal pending  -dc vanc zosyn, continue outpt doxy and levofloxacin  -crp 1.88 elevated   -fu cultures, Unable to check Legionella/strep pneumo as patient is anuric, so fu serum histoplasma antigen though low concern.           #Leucopenia, Thrombocytopenia possible infection vs immunosupression vs MGUS  ::Pt however with no fever, but reports feeling unwell , some vomiting which could be due to his gastroparesis  ::Hx of Kidney transplant on tacrolimus  ::hx of graft failure and chronic rejection  ::CXR with no concerns for infection  ::BNP 1042<< 2810  :: CT AP  2/24 Stable appearance of transplant kidney within the left iliac  fossa with edematous appearance and an adjacent fat stranding. There is a decompressed bladder with adjacent fat stranding, recommend correlation with urinalysis for UTI.  ::On Empiric antibiotics: doxycycline 1000 mg BID x 10 days+ levofloxacin renally dosed x 5days (started 2/24)  Plan:  -bld c/s, CMV, BK and EBV requested igg pos   -will start Vanc and Zosyn as pt immunosuppressed  -epo  -rsv/flu/covid  neg  -Transplant neph following        #Persistent abd pain iso Transplant kidney rejection  #ESRD on dialysis T Tu Sa  #Hx of two  failed kidney transplants c/b chronic rejection  #Chronic immunosuppression  ::Pt was to fu with transplant nephro for planned nephrectomy of transplanted kidney, saw Dr Williamson 2/24 who started on Abx Doxycycline and Levofloxacin  Edema of the transplanted kidney, treated with steroids in the past couple months.  -S/p renal transplant x 2, currently ESRD on hemodialysis.  -basline oliguirc to anuric  PLAN  - Transplant nephrology following, prednisone 5 mg daily, pending recs  - Continue tacrolimus 0.5 mg BID  - Tacrolimus level ordered fu in am  - low threshold for CT AP if pt worsening and with worsening abd pain  -tylenol sched for mild pain, oxy 5mg prn for moderate pain 10mg prn severe pain, can give dilaudid for break through pain  -lidocaine patches for neck, chest, and back pain   -prev nausea and vomiting, home reglan qtc 463  -fluid off with dialysis nephro 2/28, normal HD T/Th/Sa sched with left arm AVF, continue inpt  -dc vanc zosyn, continue outpt doxy and levofloxacin  -bp control as below   - patient is oliguric to anuric     #Gastroparesis  #Nausea and vomiting  -  restarted home metoclopramide  - will monitor     #IgG and IgA lambda MGUS  #Thrombocytopenia  - Thrombocytopenia chronic, Plt today 82<<149, may be decreased in the setting of infection vs. Immunosuppression vs. MGUS  PLAN  - CTM on daily CBC  - Continue home cinacalcet 30 mg daily  - pt stopped calcitriol outpt per HD nephrologist      #Hx of recent cataract surgery  - Continue home eye drops:  - Maxitrol 1 drop R eye q8h    - brimonidine 2% solution 1 drop R eye BID    - ketorolac 0.5% solution 1 drop R eye q8h     # Severe HTN likely due to missed morning meds dose vs severe abd pain  ::BP-200s<<157   ::Pt reports his Bps are usually high when he goes for dialysis  ::home  carvedilol 37.5 mg twice daily, Imdur 60 mg as well as nifedipine XR 90 mg twice daily  Received dose of clonidine in the ED,  Plan:  -cw home meds        #T2DM  -  Most recent A1c 9.1 12/16/24  ::Bld glucose-348  ::On lantus 18units in am, scheduled 3 units tid?, ssi  Plan:  -ISS #2   -Beta-hydroxybutyrate levels-0.10  -Lantus 10 units in am  -ac at bedtime poct glucose      #HLD  - Continue home pravastatin 10 mg daily     #GERD  - Continue home pantoprazole 40 mg daily     F: Replete PRN  E: Replete PRN  N: renal and carb controlled   A: PIV / left arm avf for hd     DVT ppx: Unfractionated heparin     Code Status: Full Code (confirmed on admission)   NOK:  Primary Emergency Contact: KETTY PLASENCIA, Home Phone: [phone]            Constance Rapp MD

## 2025-02-28 NOTE — NURSING NOTE
Report to Receiving RN:    Report To: AYO Bautista  Time Report Called: 1952  Hand-Off Communication: Pt completed 3.75 hrs, 2L fluid removed, tolerated tx, BP elevated during HD, Dr. Valdes instructed for BP med to be given if BP above 190, BP post /95, HR 77, pt stable, A/Ox4  Complications During Treatment: No  Ultrafiltration Treatment: Yes  Medications Administered During Dialysis: Yes  Blood Products Administered During Dialysis: No  Labs Sent During Dialysis: Yes  Heparin Drip Rate Changes: No  Dialysis Catheter Dressing: N/A, AVF  Last Dressing Change: N/A    Last Updated: 7:51 PM by SUJIT WAYNE

## 2025-03-01 ENCOUNTER — APPOINTMENT (OUTPATIENT)
Dept: CARDIOLOGY | Facility: HOSPITAL | Age: 70
End: 2025-03-01
Payer: MEDICARE

## 2025-03-01 ENCOUNTER — APPOINTMENT (OUTPATIENT)
Dept: DIALYSIS | Facility: HOSPITAL | Age: 70
End: 2025-03-01
Payer: MEDICARE

## 2025-03-01 LAB
ALBUMIN SERPL BCP-MCNC: 3.1 G/DL (ref 3.4–5)
ANION GAP SERPL CALC-SCNC: 17 MMOL/L (ref 10–20)
BASOPHILS # BLD AUTO: 0.01 X10*3/UL (ref 0–0.1)
BASOPHILS NFR BLD AUTO: 0.4 %
BUN SERPL-MCNC: 23 MG/DL (ref 6–23)
CALCIUM SERPL-MCNC: 8.6 MG/DL (ref 8.6–10.6)
CHLORIDE SERPL-SCNC: 95 MMOL/L (ref 98–107)
CO2 SERPL-SCNC: 27 MMOL/L (ref 21–32)
CREAT SERPL-MCNC: 8.38 MG/DL (ref 0.5–1.3)
EGFRCR SERPLBLD CKD-EPI 2021: 6 ML/MIN/1.73M*2
EOSINOPHIL # BLD AUTO: 0.11 X10*3/UL (ref 0–0.7)
EOSINOPHIL NFR BLD AUTO: 4.2 %
ERYTHROCYTE [DISTWIDTH] IN BLOOD BY AUTOMATED COUNT: 15.4 % (ref 11.5–14.5)
ERYTHROCYTE [DISTWIDTH] IN BLOOD BY AUTOMATED COUNT: 15.6 % (ref 11.5–14.5)
GLUCOSE BLD MANUAL STRIP-MCNC: 134 MG/DL (ref 74–99)
GLUCOSE BLD MANUAL STRIP-MCNC: 177 MG/DL (ref 74–99)
GLUCOSE BLD MANUAL STRIP-MCNC: 193 MG/DL (ref 74–99)
GLUCOSE SERPL-MCNC: 183 MG/DL (ref 74–99)
HCT VFR BLD AUTO: 27.3 % (ref 41–52)
HCT VFR BLD AUTO: 28.1 % (ref 41–52)
HGB BLD-MCNC: 9 G/DL (ref 13.5–17.5)
HGB BLD-MCNC: 9.1 G/DL (ref 13.5–17.5)
IMM GRANULOCYTES # BLD AUTO: 0.01 X10*3/UL (ref 0–0.7)
IMM GRANULOCYTES NFR BLD AUTO: 0.4 % (ref 0–0.9)
LYMPHOCYTES # BLD AUTO: 0.42 X10*3/UL (ref 1.2–4.8)
LYMPHOCYTES NFR BLD AUTO: 16.1 %
MAGNESIUM SERPL-MCNC: 1.93 MG/DL (ref 1.6–2.4)
MCH RBC QN AUTO: 27.7 PG (ref 26–34)
MCH RBC QN AUTO: 27.9 PG (ref 26–34)
MCHC RBC AUTO-ENTMCNC: 32.4 G/DL (ref 32–36)
MCHC RBC AUTO-ENTMCNC: 33 G/DL (ref 32–36)
MCV RBC AUTO: 85 FL (ref 80–100)
MCV RBC AUTO: 86 FL (ref 80–100)
MONOCYTES # BLD AUTO: 0.29 X10*3/UL (ref 0.1–1)
MONOCYTES NFR BLD AUTO: 11.1 %
NEUTROPHILS # BLD AUTO: 1.77 X10*3/UL (ref 1.2–7.7)
NEUTROPHILS NFR BLD AUTO: 67.8 %
NRBC BLD-RTO: 0 /100 WBCS (ref 0–0)
NRBC BLD-RTO: 0 /100 WBCS (ref 0–0)
PHOSPHATE SERPL-MCNC: 4.1 MG/DL (ref 2.5–4.9)
PLATELET # BLD AUTO: 69 X10*3/UL (ref 150–450)
PLATELET # BLD AUTO: 72 X10*3/UL (ref 150–450)
POTASSIUM SERPL-SCNC: 4.5 MMOL/L (ref 3.5–5.3)
RBC # BLD AUTO: 3.23 X10*6/UL (ref 4.5–5.9)
RBC # BLD AUTO: 3.28 X10*6/UL (ref 4.5–5.9)
SODIUM SERPL-SCNC: 134 MMOL/L (ref 136–145)
TACROLIMUS BLD-MCNC: <2 NG/ML
WBC # BLD AUTO: 2.1 X10*3/UL (ref 4.4–11.3)
WBC # BLD AUTO: 2.6 X10*3/UL (ref 4.4–11.3)

## 2025-03-01 PROCEDURE — 87632 RESP VIRUS 6-11 TARGETS: CPT

## 2025-03-01 PROCEDURE — 2500000002 HC RX 250 W HCPCS SELF ADMINISTERED DRUGS (ALT 637 FOR MEDICARE OP, ALT 636 FOR OP/ED)

## 2025-03-01 PROCEDURE — 93010 ELECTROCARDIOGRAM REPORT: CPT | Performed by: INTERNAL MEDICINE

## 2025-03-01 PROCEDURE — 6350000001 HC RX 635 EPOETIN >10,000 UNITS: Mod: JZ,TB | Performed by: INTERNAL MEDICINE

## 2025-03-01 PROCEDURE — 99221 1ST HOSP IP/OBS SF/LOW 40: CPT | Performed by: INTERNAL MEDICINE

## 2025-03-01 PROCEDURE — 80197 ASSAY OF TACROLIMUS: CPT

## 2025-03-01 PROCEDURE — 8010000001 HC DIALYSIS - HEMODIALYSIS PER DAY

## 2025-03-01 PROCEDURE — 85027 COMPLETE CBC AUTOMATED: CPT

## 2025-03-01 PROCEDURE — 82947 ASSAY GLUCOSE BLOOD QUANT: CPT

## 2025-03-01 PROCEDURE — 87799 DETECT AGENT NOS DNA QUANT: CPT

## 2025-03-01 PROCEDURE — 2500000005 HC RX 250 GENERAL PHARMACY W/O HCPCS: Performed by: NUTRITIONIST

## 2025-03-01 PROCEDURE — 85025 COMPLETE CBC W/AUTO DIFF WBC: CPT

## 2025-03-01 PROCEDURE — 2500000004 HC RX 250 GENERAL PHARMACY W/ HCPCS (ALT 636 FOR OP/ED)

## 2025-03-01 PROCEDURE — 1210000001 HC SEMI-PRIVATE ROOM DAILY

## 2025-03-01 PROCEDURE — 80069 RENAL FUNCTION PANEL: CPT

## 2025-03-01 PROCEDURE — 2500000001 HC RX 250 WO HCPCS SELF ADMINISTERED DRUGS (ALT 637 FOR MEDICARE OP)

## 2025-03-01 PROCEDURE — 90935 HEMODIALYSIS ONE EVALUATION: CPT | Performed by: INTERNAL MEDICINE

## 2025-03-01 PROCEDURE — 99233 SBSQ HOSP IP/OBS HIGH 50: CPT

## 2025-03-01 PROCEDURE — 2500000001 HC RX 250 WO HCPCS SELF ADMINISTERED DRUGS (ALT 637 FOR MEDICARE OP): Performed by: NUTRITIONIST

## 2025-03-01 PROCEDURE — 83735 ASSAY OF MAGNESIUM: CPT | Performed by: NUTRITIONIST

## 2025-03-01 PROCEDURE — 87305 ASPERGILLUS AG IA: CPT

## 2025-03-01 PROCEDURE — 93005 ELECTROCARDIOGRAM TRACING: CPT

## 2025-03-01 PROCEDURE — 36415 COLL VENOUS BLD VENIPUNCTURE: CPT

## 2025-03-01 RX ORDER — KETOROLAC TROMETHAMINE 5 MG/ML
1 SOLUTION OPHTHALMIC EVERY 8 HOURS
Status: DISCONTINUED | OUTPATIENT
Start: 2025-03-02 | End: 2025-03-02

## 2025-03-01 RX ORDER — ONDANSETRON HYDROCHLORIDE 2 MG/ML
4 INJECTION, SOLUTION INTRAVENOUS EVERY 6 HOURS PRN
Status: DISCONTINUED | OUTPATIENT
Start: 2025-03-01 | End: 2025-03-06 | Stop reason: HOSPADM

## 2025-03-01 RX ORDER — INSULIN GLARGINE 100 [IU]/ML
12 INJECTION, SOLUTION SUBCUTANEOUS EVERY MORNING
Status: DISCONTINUED | OUTPATIENT
Start: 2025-03-01 | End: 2025-03-02

## 2025-03-01 RX ORDER — NEOMYCIN SULFATE, POLYMYXIN B SULFATE AND DEXAMETHASONE 3.5; 10000; 1 MG/ML; [USP'U]/ML; MG/ML
1 SUSPENSION/ DROPS OPHTHALMIC EVERY 8 HOURS SCHEDULED
Status: DISCONTINUED | OUTPATIENT
Start: 2025-03-02 | End: 2025-03-02

## 2025-03-01 RX ORDER — CLOTRIMAZOLE 1 %
CREAM (GRAM) TOPICAL 2 TIMES DAILY
Status: DISCONTINUED | OUTPATIENT
Start: 2025-03-01 | End: 2025-03-06 | Stop reason: HOSPADM

## 2025-03-01 RX ADMIN — PRAVASTATIN SODIUM 10 MG: 20 TABLET ORAL at 20:55

## 2025-03-01 RX ADMIN — OXYCODONE 10 MG: 5 TABLET ORAL at 20:55

## 2025-03-01 RX ADMIN — TACROLIMUS 0.5 MG: 0.5 CAPSULE ORAL at 06:52

## 2025-03-01 RX ADMIN — CLOTRIMAZOLE: 10 CREAM TOPICAL at 20:55

## 2025-03-01 RX ADMIN — PREDNISONE 5 MG: 5 TABLET ORAL at 08:20

## 2025-03-01 RX ADMIN — INSULIN LISPRO 2 UNITS: 100 INJECTION, SOLUTION INTRAVENOUS; SUBCUTANEOUS at 21:04

## 2025-03-01 RX ADMIN — ISOSORBIDE MONONITRATE 60 MG: 30 TABLET, EXTENDED RELEASE ORAL at 08:20

## 2025-03-01 RX ADMIN — RENO CAPS 1 CAPSULE: 100; 1.5; 1.7; 20; 10; 1; 150; 5; 6 CAPSULE ORAL at 08:20

## 2025-03-01 RX ADMIN — ONDANSETRON 4 MG: 2 INJECTION INTRAMUSCULAR; INTRAVENOUS at 08:30

## 2025-03-01 RX ADMIN — SEVELAMER CARBONATE 800 MG: 800 TABLET, FILM COATED ORAL at 08:20

## 2025-03-01 RX ADMIN — METOCLOPRAMIDE 5 MG: 10 TABLET ORAL at 18:13

## 2025-03-01 RX ADMIN — PANTOPRAZOLE SODIUM 40 MG: 40 TABLET, DELAYED RELEASE ORAL at 06:52

## 2025-03-01 RX ADMIN — INSULIN LISPRO 2 UNITS: 100 INJECTION, SOLUTION INTRAVENOUS; SUBCUTANEOUS at 09:19

## 2025-03-01 RX ADMIN — ACETAMINOPHEN 975 MG: 325 TABLET ORAL at 20:55

## 2025-03-01 RX ADMIN — INSULIN GLARGINE 12 UNITS: 100 INJECTION, SOLUTION SUBCUTANEOUS at 09:19

## 2025-03-01 RX ADMIN — CARVEDILOL 37.5 MG: 12.5 TABLET, FILM COATED ORAL at 20:54

## 2025-03-01 RX ADMIN — NIFEDIPINE 90 MG: 90 TABLET, FILM COATED, EXTENDED RELEASE ORAL at 08:20

## 2025-03-01 RX ADMIN — ONDANSETRON 4 MG: 2 INJECTION INTRAMUSCULAR; INTRAVENOUS at 01:21

## 2025-03-01 RX ADMIN — ACETAMINOPHEN 975 MG: 325 TABLET ORAL at 08:20

## 2025-03-01 RX ADMIN — CEFEPIME 1 G: 1 INJECTION, SOLUTION INTRAVENOUS at 20:55

## 2025-03-01 RX ADMIN — NIFEDIPINE 90 MG: 90 TABLET, FILM COATED, EXTENDED RELEASE ORAL at 20:55

## 2025-03-01 RX ADMIN — EPOETIN ALFA-EPBX 10000 UNITS: 10000 INJECTION, SOLUTION INTRAVENOUS; SUBCUTANEOUS at 20:55

## 2025-03-01 RX ADMIN — HEPARIN SODIUM 5000 UNITS: 5000 INJECTION, SOLUTION INTRAVENOUS; SUBCUTANEOUS at 22:45

## 2025-03-01 RX ADMIN — LIDOCAINE 4% 2 PATCH: 40 PATCH TOPICAL at 08:20

## 2025-03-01 RX ADMIN — METOCLOPRAMIDE 5 MG: 10 TABLET ORAL at 12:07

## 2025-03-01 RX ADMIN — METOCLOPRAMIDE 5 MG: 10 TABLET ORAL at 06:52

## 2025-03-01 RX ADMIN — ACETAMINOPHEN 975 MG: 325 TABLET ORAL at 18:13

## 2025-03-01 RX ADMIN — CARVEDILOL 37.5 MG: 12.5 TABLET, FILM COATED ORAL at 08:20

## 2025-03-01 RX ADMIN — SEVELAMER CARBONATE 800 MG: 800 TABLET, FILM COATED ORAL at 20:55

## 2025-03-01 RX ADMIN — SEVELAMER CARBONATE 800 MG: 800 TABLET, FILM COATED ORAL at 18:13

## 2025-03-01 RX ADMIN — CINACALCET HYDROCHLORIDE 30 MG: 30 TABLET, FILM COATED ORAL at 08:20

## 2025-03-01 RX ADMIN — TACROLIMUS 0.5 MG: 0.5 CAPSULE ORAL at 18:13

## 2025-03-01 SDOH — SOCIAL STABILITY: SOCIAL INSECURITY: HAVE YOU HAD THOUGHTS OF HARMING ANYONE ELSE?: NO

## 2025-03-01 SDOH — SOCIAL STABILITY: SOCIAL INSECURITY: HAVE YOU HAD ANY THOUGHTS OF HARMING ANYONE ELSE?: NO

## 2025-03-01 SDOH — ECONOMIC STABILITY: TRANSPORTATION INSECURITY: IN THE PAST 12 MONTHS, HAS LACK OF TRANSPORTATION KEPT YOU FROM MEDICAL APPOINTMENTS OR FROM GETTING MEDICATIONS?: NO

## 2025-03-01 SDOH — SOCIAL STABILITY: SOCIAL INSECURITY: WITHIN THE LAST YEAR, HAVE YOU BEEN AFRAID OF YOUR PARTNER OR EX-PARTNER?: NO

## 2025-03-01 SDOH — ECONOMIC STABILITY: FOOD INSECURITY: WITHIN THE PAST 12 MONTHS, THE FOOD YOU BOUGHT JUST DIDN'T LAST AND YOU DIDN'T HAVE MONEY TO GET MORE.: NEVER TRUE

## 2025-03-01 SDOH — ECONOMIC STABILITY: INCOME INSECURITY: IN THE PAST 12 MONTHS HAS THE ELECTRIC, GAS, OIL, OR WATER COMPANY THREATENED TO SHUT OFF SERVICES IN YOUR HOME?: NO

## 2025-03-01 SDOH — ECONOMIC STABILITY: HOUSING INSECURITY: IN THE PAST 12 MONTHS, HOW MANY TIMES HAVE YOU MOVED WHERE YOU WERE LIVING?: 0

## 2025-03-01 SDOH — HEALTH STABILITY: MENTAL HEALTH: HOW OFTEN DO YOU HAVE SIX OR MORE DRINKS ON ONE OCCASION?: NEVER

## 2025-03-01 SDOH — SOCIAL STABILITY: SOCIAL INSECURITY: ABUSE: ADULT

## 2025-03-01 SDOH — SOCIAL STABILITY: SOCIAL INSECURITY: HAS ANYONE EVER THREATENED TO HURT YOUR FAMILY OR YOUR PETS?: NO

## 2025-03-01 SDOH — SOCIAL STABILITY: SOCIAL INSECURITY: ARE YOU OR HAVE YOU BEEN THREATENED OR ABUSED PHYSICALLY, EMOTIONALLY, OR SEXUALLY BY ANYONE?: NO

## 2025-03-01 SDOH — HEALTH STABILITY: MENTAL HEALTH: HOW OFTEN DO YOU HAVE A DRINK CONTAINING ALCOHOL?: NEVER

## 2025-03-01 SDOH — SOCIAL STABILITY: SOCIAL INSECURITY: WITHIN THE LAST YEAR, HAVE YOU BEEN HUMILIATED OR EMOTIONALLY ABUSED IN OTHER WAYS BY YOUR PARTNER OR EX-PARTNER?: NO

## 2025-03-01 SDOH — ECONOMIC STABILITY: HOUSING INSECURITY: IN THE LAST 12 MONTHS, WAS THERE A TIME WHEN YOU WERE NOT ABLE TO PAY THE MORTGAGE OR RENT ON TIME?: YES

## 2025-03-01 SDOH — SOCIAL STABILITY: SOCIAL INSECURITY: WERE YOU ABLE TO COMPLETE ALL THE BEHAVIORAL HEALTH SCREENINGS?: YES

## 2025-03-01 SDOH — ECONOMIC STABILITY: FOOD INSECURITY: HOW HARD IS IT FOR YOU TO PAY FOR THE VERY BASICS LIKE FOOD, HOUSING, MEDICAL CARE, AND HEATING?: NOT VERY HARD

## 2025-03-01 SDOH — SOCIAL STABILITY: SOCIAL INSECURITY: DO YOU FEEL UNSAFE GOING BACK TO THE PLACE WHERE YOU ARE LIVING?: NO

## 2025-03-01 SDOH — ECONOMIC STABILITY: HOUSING INSECURITY: AT ANY TIME IN THE PAST 12 MONTHS, WERE YOU HOMELESS OR LIVING IN A SHELTER (INCLUDING NOW)?: NO

## 2025-03-01 SDOH — ECONOMIC STABILITY: FOOD INSECURITY: WITHIN THE PAST 12 MONTHS, YOU WORRIED THAT YOUR FOOD WOULD RUN OUT BEFORE YOU GOT THE MONEY TO BUY MORE.: NEVER TRUE

## 2025-03-01 SDOH — SOCIAL STABILITY: SOCIAL INSECURITY: ARE THERE ANY APPARENT SIGNS OF INJURIES/BEHAVIORS THAT COULD BE RELATED TO ABUSE/NEGLECT?: NO

## 2025-03-01 SDOH — SOCIAL STABILITY: SOCIAL INSECURITY: DOES ANYONE TRY TO KEEP YOU FROM HAVING/CONTACTING OTHER FRIENDS OR DOING THINGS OUTSIDE YOUR HOME?: NO

## 2025-03-01 SDOH — SOCIAL STABILITY: SOCIAL INSECURITY: DO YOU FEEL ANYONE HAS EXPLOITED OR TAKEN ADVANTAGE OF YOU FINANCIALLY OR OF YOUR PERSONAL PROPERTY?: NO

## 2025-03-01 SDOH — HEALTH STABILITY: MENTAL HEALTH: HOW MANY DRINKS CONTAINING ALCOHOL DO YOU HAVE ON A TYPICAL DAY WHEN YOU ARE DRINKING?: PATIENT DOES NOT DRINK

## 2025-03-01 ASSESSMENT — COGNITIVE AND FUNCTIONAL STATUS - GENERAL
MOBILITY SCORE: 24
PATIENT BASELINE BEDBOUND: NO
DAILY ACTIVITIY SCORE: 24
DAILY ACTIVITIY SCORE: 24
MOBILITY SCORE: 24

## 2025-03-01 ASSESSMENT — PAIN SCALES - GENERAL
PAINLEVEL_OUTOF10: 0 - NO PAIN
PAINLEVEL_OUTOF10: 0 - NO PAIN
PAINLEVEL_OUTOF10: 7

## 2025-03-01 ASSESSMENT — LIFESTYLE VARIABLES
AUDIT-C TOTAL SCORE: 0
SKIP TO QUESTIONS 9-10: 1

## 2025-03-01 ASSESSMENT — PAIN DESCRIPTION - LOCATION: LOCATION: BACK

## 2025-03-01 ASSESSMENT — PAIN - FUNCTIONAL ASSESSMENT: PAIN_FUNCTIONAL_ASSESSMENT: NO/DENIES PAIN

## 2025-03-01 ASSESSMENT — ACTIVITIES OF DAILY LIVING (ADL): LACK_OF_TRANSPORTATION: NO

## 2025-03-01 NOTE — CARE PLAN
"    The clinical goals for the shift include pt will have no complaints of chest pain through end of shift    VSS, pt A&Ox4, and independent. Pt had complains of abdominal discomfort/cramping, received tylenol and zofran with slight improvement.   Pt off unit most of the shift for HD- 2L removed. Respiratory swab for viral panel was sent. Pt refused sputum culture stating he has a \"dry cough.\" Unable to collect urine sample d/t diminished urine production.  "

## 2025-03-01 NOTE — CONSULTS
Inpatient consult to Infectious Diseases  Consult performed by: Leora Davis MD  Consult ordered by: Jovany Philippe MD MPH         Primary MD: Elma Hutton, APRN-CNP, DNP    Reason For Consult    ongoing infection concern, transplant patient       History Of Present Illness  Manolo Bashir is a 69 y.o. male with PmHx of ESRD s/p renal transplants x2, non-functioning: initially in 1992 c/b allograft failure 2006, 2nd transplant 2013, c/b impaired allograft function, currently on IHD since May 2024; on tacrolimus and prednisone 5mg, MGUS, DMII, hypertension, prostate cancer s/p radical prostatectomy, HCV (treated w/ Harvoni, HCV PCR negative 2019).  Recent history of C. diff (treated w/ vancomycin PO 10 day course EOT 2/6/25) as well as development of graft intolerance over past 2 months, currently being evaluated for nephrectomy due to persistent graft related left lower quadrant pain. Recently received course of empiric doxycycline 100 mg BID x 10 days + levofloxacin x 5 days for suspected PNA.    Now admitted on 2/27/25 presenting with sudden onset sharp chest pain, associated with productive cough, sinus pressure and pain, all of which have resolved since admission. Continues to have LLQ pain at allograft site, also complained of rash in genital area. Denies N/V/D, does not make urine (occasional dribbling)    Afebrile, BlCx 2/27/25 x2 NGTD, COVID/FLU negative 2/28,   Pancytopenia w/ WBC 2.6, ANC 1.02.  Strep, legionella Ag not completed due to anuria.  CRP 1.88, Procal 0.28.     Presented on 2/27/25 with sudden onset sharp chest pain, had productive cough and sinus pressure and pain, all of which have resolved since admission. Continues to have LLQ pain at allograft site, also complained of rash in genital area. Denies N/V/D, does not make urine (occasional dribbling),     Retired/on disability, previously years ago worked in [employer], then as  at [employer]. Lives with daughter who is in good  health. No pets or animal contact. No sick contacts.      Past Medical History  He has a past medical history of Anemia, Arthritis, Cataract, Chronic kidney disease, stage 3 unspecified (Multi) (09/26/2018), CKD (chronic kidney disease), Cough (02/12/2024), COVID-19 (06/18/2020), Diabetes (Multi), ESRD (end stage renal disease) (Multi), Focal and segmental proliferative glomerulonephritis (12/23/2023), HTN (hypertension), Hyperlipidemia, Other long term (current) drug therapy (07/20/2021), Personal history of other diseases of the circulatory system, Personal history of other infectious and parasitic diseases (08/17/2015), Polyp, colonic (08/17/2023), Primary osteoarthritis of both ankles (08/17/2023), Prostate cancer (Multi), Tubular adenoma of colon (08/17/2023), and Unspecified kidney failure (08/17/2016).    Surgical History  He has a past surgical history that includes Prostatectomy (10/11/2013); Ileostomy (04/25/2017); Other surgical history (04/21/2017); Ileostomy closure (08/17/2015); Other surgical history (08/17/2015); Other surgical history (08/17/2015); US guided percutaneous peritoneal or retroperitoneal fluid collection drainage (10/20/2022); transplant, kidney, open (1992); transplant, kidney, open (2013); and US guided percutaneous biopsy renal left (Left, 11/20/2023).     Social History     Occupational History    Not on file   Tobacco Use    Smoking status: Never     Passive exposure: Past    Smokeless tobacco: Never   Vaping Use    Vaping status: Never Used   Substance and Sexual Activity    Alcohol use: Never    Drug use: Not on file    Sexual activity: Defer     Travel History   Travel since 02/01/25    No documented travel since 02/01/25            Pets: none     Family History  Family History   Problem Relation Name Age of Onset    Bone cancer Mother      Other (corona's sarcome of the bone marrow) Mother      Prostate cancer Father      Diabetes Other Family Hist     Hypertension Other Family  Hist      Allergies  Patient has no known allergies.     Immunization History   Administered Date(s) Administered    Hep A / Hep B 08/17/2015    Hep A, Unspecified 08/17/2015    Hepatitis A vaccine, age 19 years and greater (HAVRIX) 08/17/2015    Influenza, Unspecified 09/22/2009    Influenza, seasonal, injectable 09/22/2009    Pneumococcal Conjugate PCV 7 1956     Medications  Home medications:  Medications Prior to Admission   Medication Sig Dispense Refill Last Dose/Taking    doxycycline (Adoxa) 100 mg tablet Take 1 tablet (100 mg) by mouth 2 times a day for 10 days. Take with a full glass of water and do not lie down for at least 30 minutes after 20 tablet 0 2/27/2025 Evening    imiquimod (Aldara) 5 % cream Apply 1 packet topically 3 times a week. 12 packet 3 Past Week    acetaminophen (Tylenol) 325 mg tablet Take 3 tablets (975 mg) by mouth every 6 hours if needed for mild pain (1 - 3).   2/26/2025 Evening    calcitriol (Rocaltrol) 0.5 mcg capsule Take 1 capsule (0.5 mcg) by mouth once daily. 90 capsule 3 2/26/2025 Morning    carvedilol (Coreg) 12.5 mg tablet Take 3 tablets (37.5 mg) by mouth 2 times a day. PATIENT NEEDS TO FOLLOW UP WITH CARDIOLOGY (Patient taking differently: Take 2 tablets (25 mg) by mouth 2 times a day. PATIENT NEEDS TO FOLLOW UP WITH CARDIOLOGY) 180 tablet 1 2/26/2025 Evening    cinacalcet (Sensipar) 30 mg tablet Take 1 tablet (30 mg) by mouth once daily. 30 tablet 1     Easy Touch Alcohol Prep Pads pads, medicated        epoetin aida 10,000 unit/mL injection Inject 1 mL (10,000 Units) under the skin every 7 days. Do not start before April 17, 2024. (Patient not taking: Reported on 2/28/2025) 4 mL 11 Not Taking    glucagon (Gvoke HypoPen 1-Pack) 1 mg/0.2 mL auto-injector Inject 1 mg into the muscle every 15 minutes if needed (For blood glucose 41 to 70 mg/dL and no IV access). 0.2 mL 12     insulin glargine (Lantus) 100 unit/mL (3 mL) pen Inject 18 Units under the skin once daily in  the morning. Inject 20 units daily as directed   2/26/2025 Morning    insulin lispro (HumaLOG KwikPen Insulin) 100 unit/mL injection Inject 3 Units under the skin 3 times daily (morning, midday, late afternoon). 2 units with meals plus sliding scale up to 30 units daily 15 mL 0 2/26/2025 Morning    isosorbide mononitrate ER (Imdur) 60 mg 24 hr tablet Take 1 tablet (60 mg) by mouth once daily. 90 tablet 3 2/26/2025 Morning    ketorolac (Acular) 0.5 % ophthalmic solution Instill 1 drop into right eye three times a day FOR USE AFTER CATARACT SURGERY (Patient not taking: Reported on 2/28/2025) 5 mL 1 Not Taking    ketorolac (Acular) 0.5 % ophthalmic solution Instill 1 drop into left eye three times a day FOR USE AFTER CATARACT SURGERY (Patient taking differently: Administer 1 drop into the left eye 3 times a day.) 5 mL 1 2/26/2025 Evening    lancets (Unilet Super Thin Lancets) 30 gauge misc USE TO TEST BLOOD SUGAR THREE TIMES A  each 0     lancing device misc use as directed 1 each 0     levoFLOXacin (Levaquin) 250 mg tablet Take 1 tablet (250 mg) by mouth once daily for 5 days. 5 tablet 0 2/26/2025 Morning    metoclopramide (Reglan) 5 mg tablet Take 1 tablet (5 mg) by mouth 3 times a day before meals. 90 tablet 1 2/26/2025 Evening    neomycin-polymyxin-dexAMETHasone (Maxitrol) 3.5mg/mL-10,000 unit/mL-0.1 % ophthalmic suspension Instill 1 drop into right eye three times a day FOR USE AFTER CATARACT SURGERY (Patient not taking: Reported on 2/28/2025) 5 mL 1 Not Taking    neomycin-polymyxin-dexAMETHasone (Maxitrol) 3.5mg/mL-10,000 unit/mL-0.1 % ophthalmic suspension Instill 1 drop into left eye three times a day FOR USE AFTER CATARACT SURGERY (Patient taking differently: Administer 1 drop into the left eye 3 times a day.) 5 mL 1 2/26/2025 Evening    NIFEdipine ER (Adalat CC) 90 mg 24 hr tablet Take 1 tablet (90 mg) by mouth 2 times a day. Do not crush, chew, or split. 60 tablet 0 2/26/2025 Morning    pantoprazole  "(ProtoNix) 40 mg EC tablet Take 1 tablet (40 mg) by mouth once daily in the morning. Take before meals. Do not crush, chew, or split. Do not start before January 22, 2024. 30 tablet 11 2/26/2025 Morning    pen needle, diabetic (TechLITE Pen Needle) 32 gauge x 5/32\" needle Use 4 a day as directed 400 each 3     pravastatin (Pravachol) 10 mg tablet Take 1 tablet (10 mg) by mouth once daily at bedtime. 90 tablet 1 2/26/2025 Evening    predniSONE (Deltasone) 5 mg tablet Take by mouth once daily. Take 8 tablets (40 mg) by mouth once daily for 5 days, THEN 4 tablets (20 mg) once daily for 5 days, THEN 2 tablets (10 mg) once daily for 5 days, THEN 1 tablet (5 mg) once daily.   2/26/2025 Morning    sevelamer carbonate (Renvela) 800 mg tablet Take 1 tablet (800 mg) by mouth 3 times a day before meals. Swallow tablet whole; do not crush, break, or chew. 90 tablet 0     syringe with needle 3 mL 25 x 5/8\" syringe Use to inject epogen. 7 each 0     tacrolimus (Prograf) 0.5 mg capsule Take 1 capsule (0.5 mg) by mouth 2 times a day. 60 capsule 11 2/26/2025 Evening    tacrolimus (Protopic) 0.1 % ointment Apply topically 2 times a day. (Patient not taking: Reported on 2/28/2025) 60 g 3 Not Taking    Unilet Super Thin Lancets 30 gauge misc 1 each 3 times a day. 100 each 3     vitamin B complex-vitamin C-folic acid (Nephrocaps) 1 mg capsule Take 1 capsule by mouth once daily. 30 capsule 11 2/26/2025 Morning     Current medications:  Scheduled medications  acetaminophen, 975 mg, oral, TID  carvedilol, 37.5 mg, oral, BID  cefepime, 1 g, intravenous, q24h  cinacalcet, 30 mg, oral, Daily  [Held by provider] doxycylcine, 100 mg, oral, BID  epoetin aida or biosimilar, 10,000 Units, intravenous, Once per day on Tuesday Thursday Saturday  heparin (porcine), 5,000 Units, subcutaneous, q8h ZOË  insulin glargine, 12 Units, subcutaneous, q AM  insulin lispro, 0-10 Units, subcutaneous, TID AC  isosorbide mononitrate ER, 60 mg, oral, " Daily  ketorolac, 1 drop, Right Eye, q8h  [Held by provider] levoFLOXacin, 500 mg, oral, q48h  lidocaine, 2 patch, transdermal, Daily  metoclopramide, 5 mg, oral, TID AC  neomycin-polymyxin-dexAMETHasone, 1 drop, Right Eye, q8h ZOË  NIFEdipine ER, 90 mg, oral, BID  pantoprazole, 40 mg, oral, Daily before breakfast  polyethylene glycol, 17 g, oral, Daily  pravastatin, 10 mg, oral, Nightly  predniSONE, 5 mg, oral, Daily  sevelamer carbonate, 800 mg, oral, TID AC  tacrolimus, 0.5 mg, oral, q12h ZOË  vitamin B complex-vitamin C-folic acid, 1 capsule, oral, Daily      Continuous medications     PRN medications  PRN medications: benzonatate, dextrose, dextrose, glucagon, glucagon, nitroglycerin, ondansetron, oxyCODONE, oxyCODONE, sodium chloride    Review of Systems     Objective  Range of Vitals (last 24 hours)  Heart Rate:  [63-84]   Temp:  [36.4 °C (97.5 °F)-36.7 °C (98.1 °F)]   Resp:  [16]   BP: (129-147)/(65-77)   SpO2:  [96 %-99 %]   Daily Weight  02/27/25 : 68 kg (150 lb)    Body mass index is 22.81 kg/m².     Physical Exam     GENERAL APPEARANCE: 69-year-old AA male, alert and cooperative, and appears to be in no acute distress.  HEAD: normocephalic  THROAT: Oral mucosa moist, no oral lesions seen.   CARDIAC: Regular rate and rhythm. + murmur appreciated.   LUNGS: Scattered crackles bilaterally. No wheezing. Normal work of breathing.  ABDOMEN: Positive bowel sounds. Soft, nondistended, tender to palpation in LLQ, LLQ renal allograft palpated.  NEUROLOGICAL: No obvious focal deficits.  Moving all 4 extremities, speech clear  SKIN: Skin warm and dry.  Midline superior aspect of anterior scrotum with macerated macular white plaques   PSYCHIATRIC: Flat affect.  LINES/TUBES/CATHETERS: LUE AV fistula + AV graft, thrill appreciated, bruit heard    Relevant Results    Labs  Results from last 72 hours   Lab Units 03/01/25  0648 02/28/25  0648 02/27/25  0809   WBC AUTO x10*3/uL 2.1* 2.6* 1.8*   HEMOGLOBIN g/dL 9.0* 9.4*  8.8*   HEMATOCRIT % 27.3* 29.0* 25.8*   PLATELETS AUTO x10*3/uL 72* 86* 82*   NEUTROS PCT AUTO %  --   --  55.8   LYMPHS PCT AUTO %  --   --  27.3   MONOS PCT AUTO %  --   --  14.8   EOS PCT AUTO %  --   --  1.1     Results from last 72 hours   Lab Units 03/01/25  0648 02/28/25  0648 02/27/25  0809   SODIUM mmol/L 134* 136 133*   POTASSIUM mmol/L 4.5 4.4 4.7   CHLORIDE mmol/L 95* 95* 100   CO2 mmol/L 27 34* 24   BUN mg/dL 23 13 25*   CREATININE mg/dL 8.38* 6.79* 9.60*   GLUCOSE mg/dL 183* 232* 348*   CALCIUM mg/dL 8.6 8.8 8.9   ANION GAP mmol/L 17 11 14   EGFR mL/min/1.73m*2 6* 8* 5*   PHOSPHORUS mg/dL 4.1 3.4 3.5     Results from last 72 hours   Lab Units 03/01/25  0648 02/28/25  0648 02/27/25  0809   ALK PHOS U/L  --   --  64   BILIRUBIN TOTAL mg/dL  --   --  0.4   PROTEIN TOTAL g/dL  --   --  6.4   ALT U/L  --   --  8*   AST U/L  --   --  16   ALBUMIN g/dL 3.1* 3.2* 3.2*     Estimated Creatinine Clearance: 8.1 mL/min (A) (by C-G formula based on SCr of 8.38 mg/dL (H)).  C-Reactive Protein   Date Value Ref Range Status   02/28/2025 1.88 (H) <1.00 mg/dL Final   02/01/2024 0.57 <1.00 mg/dL Final     CRP   Date Value Ref Range Status   10/20/2022 3.17 (A) mg/dL Final     Comment:     REF VALUE  < 1.00       Sedimentation Rate   Date Value Ref Range Status   10/20/2022 28 (H) 0 - 20 mm/h Final   10/15/2022 60 (H) 0 - 20 mm/h Final   03/04/2022 1 0 - 20 mm/h Final     HIV 1/2 Antigen/Antibody Screen with Reflex to Confirmation   Date Value Ref Range Status   08/24/2024 Nonreactive Nonreactive Final     Hepatitis C AB   Date Value Ref Range Status   07/10/2024 Reactive (A) Nonreactive Final     Comment:     HCV antibody detected. HCV RNA PCR has been ordered to evaluate the possibility of a current infection.     Results from patients taking biotin supplements or receiving high-dose biotin therapy should be interpreted with caution due to possible interference with this test. Providers may contact their local laboratory  for further information.     HCV PCR Quant   Date Value Ref Range Status   03/04/2022 NOT DETECTED IU/mL Final     Comment:     REF VALUE  NOT DETECTED       Microbiology  No results found for the last 90 days.     Imaging   CT CHEST ABDOMEN PELVIS WO CONTRAST;  2/24/2025 12:19 pm      INDICATION:  Signs/Symptoms:pneumonia.  IMPRESSION:  CHEST:  1. Interval development of bilateral ground-glass opacities, given  patient's history of renal failure, cardiomegaly, and moderate right  and small left pleural effusion these are favored to represent  pulmonary edema rather than multifocal infection however this can not  be ruled out. Repeat scan is recommended in 8-12 weeks to ensure  resolution.  2. The pulmonary artery is dilated measuring 3.5 cm in maximum  diameter, recommend correlation for pulmonary hypertension.      ABDOMEN-PELVIS:  1.  Stable appearance of transplant kidney within the left iliac  fossa with edematous appearance and an adjacent fat stranding.  2. There is a decompressed bladder with adjacent fat stranding,  recommend correlation with urinalysis for UTI.            Assessment/Plan     70 y/o male with PmHx of ESRD s/p renal transplants x2, non-functioning: initially in 1992 c/b allograft failure 2006, 2nd transplant 2013, c/b impaired allograft function, currently on IHD since May 2024; on tacrolimus and prednisone 5mg, MGUS, DMII, hypertension, prostate cancer s/p radical prostatectomy, HCV (treated w/ Aries, HCV PCR negative 2019).  Recent history of C. diff (treated w/ vancomycin PO 10 day course EOT 2/6/25) as well as development of graft intolerance over past 2 months, currently being evaluated for nephrectomy due to persistent graft related left lower quadrant pain. Recently received course of empiric doxycycline 100 mg BID x 10 days + levofloxacin x 5 days for suspected PNA.    Now admitted on 2/27/25 presenting with sudden onset sharp chest pain, associated with productive cough, sinus  pressure and pain, all of which have resolved since admission. Continues to have LLQ pain at allograft site, also complained of rash in genital area. Denies N/V/D, does not make urine (occasional dribbling)    Afebrile, BlCx 2/27/25 x2 NGTD, COVID/FLU negative 2/28,   Pancytopenia w/ WBC 2.6, ANC 1.02.  Strep, legionella Ag not completed due to anuria.  CRP 1.88, Procal 0.28.    T-spot and Syphilis Ab negative in 7/2024. HIV screen negative 8/2024.      Impression:  #Chronic Multifocal pulmonary GGO - present on multiple imaging studies over minimum past 6 mo  #Suspected viral lower respiratory tract infection w/ sinusitis  #Renal allograft intolerance    Recommendations:  Given patient's transplant and immunosuppressed status, will pursue broad workup to rule out less common etiologies (such as TB, nocardia, Histoplasma,  in addition to common.     -please check CBC with diff  -please send respiratory viral panel, sputum for bacterial, fungal, and AFB cultures,   -noted pending fungitell and serum Histo Ag, can also add aspergillus galactomannan, Blastomyces Ab  -check CMV, EBV, BK virus PCR quant  -send tspot  -consider consult to pulmonology for evaluation for bronchoscopy and BAL - if done please send for bacterial, fungal, and AFB cultures, MTB/Rif PCR, and Pneumocystis PCR  -cannot rule out allograft infection due to anuria  -please provide topical clotrimazole or terbinafine for scrotal lesions    ID will follow.  Patient discussed with Dr. Preston.    Leora Davis MD  ID Fellow, PGY V.   For questions on [facility] patients please reach out via Epic chat with any questions or concerns.

## 2025-03-01 NOTE — NURSING NOTE
Report to Receiving RN:    Report To: AYO Gonzales  Time Report Called: 17:15  Hand-Off Communication: tolerated tx well no issues or concern, removed 2L of fluid, post tx /75 HR 73, alert and stable post tx  Complications During Treatment: No  Ultrafiltration Treatment: No  Medications Administered During Dialysis: No  Blood Products Administered During Dialysis: No  Labs Sent During Dialysis: No  Heparin Drip Rate Changes: N/A  Dialysis Catheter Dressing: fistula, N/A  Last Dressing Change: N/A    Last Updated: 5:28 PM by DES SILVESTRE

## 2025-03-01 NOTE — PROGRESS NOTES
Manolo Bashir is a 69 y.o. male on day 2 of admission presenting with Acute chest pain.      Subjective   Pt complaining of his chronic pain, was resting in bed this AM.     Objective     Last Recorded Vitals  /77   Pulse 66   Temp 36.2 °C (97.2 °F) (Temporal)   Resp 16   Wt 68 kg (150 lb)   SpO2 96%   Intake/Output last 3 Shifts:    Intake/Output Summary (Last 24 hours) at 3/1/2025 1321  Last data filed at 3/1/2025 1300  Gross per 24 hour   Intake 1240 ml   Output 2400 ml   Net -1160 ml       Admission Weight  Weight: 68 kg (150 lb) (02/27/25 0721)    Daily Weight  02/27/25 : 68 kg (150 lb)    Image Results  ECG 12 lead  Normal sinus rhythm  Right axis deviation  Nonspecific intraventricular block  Abnormal ECG  When compared with ECG of 31-JAN-2025 12:19,  QRS axis Shifted right    See ED provider note for full interpretation and clinical correlation  Confirmed by Millicent Sheikh ([id number]) on 2/27/2025 10:50:01 PM  ECG 12 Lead  Normal sinus rhythm  Right axis deviation  Nonspecific intraventricular block  Abnormal ECG  When compared with ECG of 27-FEB-2025 06:58,  No significant change was found    See ED provider note for full interpretation and clinical correlation  Confirmed by Millicent Sheikh [id number]) on 2/27/2025 10:49:54 PM  ECG 12 lead  Normal sinus rhythm  Left axis deviation  Left bundle branch block  Abnormal ECG  When compared with ECG of 27-FEB-2025 06:59,  QRS axis Shifted left  Nonspecific T wave abnormality now evident in Lateral leads    See ED provider note for full interpretation and clinical correlation  Confirmed by Millicent Sheikh [id number]) on 2/27/2025 10:49:41 PM  XR chest 2 views  Narrative: STUDY:  Chest Radiographs;  2/27/2025 8:24AM  INDICATION:  Chest pain.  COMPARISON:  4/3/2024 XR Chest.  ACCESSION NUMBER(S):  [id number]  ORDERING CLINICIAN:  NO ELIZABETH  TECHNIQUE:  Frontal and lateral chest.   FINDINGS:  CARDIOMEDIASTINAL SILHOUETTE:  Cardiomediastinal silhouette is normal in  size and configuration.     LUNGS:  Lungs are clear.     ABDOMEN:  No remarkable upper abdominal findings.     BONES:  No acute osseous changes.  Impression: No acute cardiopulmonary disease.  Signed by Jacques Valdez MD      Physical Exam  Constitutional:       General: He is not in acute distress.     Appearance: Normal appearance.   HENT:      Head: Normocephalic and atraumatic.   Eyes:      General:         Right eye: No discharge.         Left eye: No discharge.      Extraocular Movements: Extraocular movements intact.   Cardiovascular:      Rate and Rhythm: Normal rate and regular rhythm.      Heart sounds: No murmur heard.     No friction rub. No gallop.   Pulmonary:      Effort: No respiratory distress.      Breath sounds: No wheezing or rhonchi.   Abdominal:      General: Abdomen is flat.      Palpations: Abdomen is soft.   Musculoskeletal:         General: No tenderness.      Right lower leg: No edema.      Left lower leg: No edema.      Comments: Nodules on b/l arms, r arm avf   Skin:     General: Skin is warm and dry.   Neurological:      Mental Status: He is alert and oriented to person, place, and time. Mental status is at baseline.   Psychiatric:         Mood and Affect: Mood normal.         Behavior: Behavior normal.         A/P:  69 year old with ESRD from hypertension s/p 2 kidney transplants (1998 and 2013) that failed in may 2024 now on HD (T/TH/S) through [facility]), MGUS, T2DM, HTN, prostate cancer s/p prostatectomy, urine incontinence who presented with CP 2/27, labs concerning for leukopenia, thrombocytopenia, however patient afebrile some infective process going on in the setting of his immunosuppression.  Patient's chest pain unlikely ACS as EKG and trops negative but responded to nitroglycerin and aspirin. BNP elevated and chest tender to palpation on exam. CT and exam showing fluid up, so pt went to dialysis 2/28 fluid removal, rsv/covid/flu neg, pt with cough +/- productive, ctm after fluid  removal for improvement in resp status.         Updates 3/1:  -HD today   -increased lantus to 12u   -procal: 0.24  -per transplant nephrology recs, there is ongoing c/f infection so will broaden abx to cefepime (from doxy/levofloxacin that pt was prescribed at outpt appt) and will send further infectious w/up: EBV/CMV/BK DNA, fungitell + recommended ID consult. Goal is to optimize patient for potential inpatient nephrectomy    -histo pending   -ID consulted today, prelim recs include aspergillus, resp viral panel, cbc w diff, sputum cultures   -rsv/flu/covid  neg  -sched tylenol and scheduled lidocaine patches for neck, chest, and back pain   -carb controlled renal diet   - qtc 463  -fluid off with dialysis nephro 2/28, normal HD T/Th/Sa sched with left arm AVF, continue inpt  -Transplant neph following   -tessalon pearls started for cough +/- productive   -ns spray started   -chest wall tender to palpation, ctm   -monitor of decreased lantus and ssi, ctm consider restart home insulin reg   -for htn continue home meds and monitor blood pressure. Can use hydralazine 25 mg p.o. as needed for SBP more than 170.  -crp 1.88 elevated   -fu cultures, Unable to check Legionella/strep pneumo as patient is oliguric to anuric, so fu serum histoplasma antigen though low concern.  - pt stopped calcitriol outpt per HD nephrologist       #Chest Pain, 2/2 Edema vs Resp Infection, improving   #HFpEF, diastolic dysfunction  #Cough  #SOB  ::Pt with ESRD high risk for CAD,  sharp CP in sternal region, brought on by exertion and relieved w/nitroglycerin patient however no characteristic ACS chest pain, Trops and EKG however negative, no concerns for Pericarditis, trops neg, bnp elevated, afebrile, leukopenia, immunocompromise, recent steroid burst with taper, still on tacrolimus currently.  Has been on levofloxacin and doxycycline past few days, prescribed outpatient by transplant nephrology.  ::Heart score of 4  ::ED reported  tenderness on palpation of chest at presentation with concern for possible MSK pain  ::pT currently denies any CP  Plan;  -sublingual nitroglycerin prn for pain  -rsv/flu/covid  neg  -sched tylenol and scheduled lidocaine patches for neck, chest, and back pain   -prev nausea and vomiting, reglan qtc 463  -fluid off with dialysis nephro 2/28, normal HD T/Th/Sa sched with left arm AVF, continue inpt  -tessalon pearls started for cough +/- productive   -ns spray started   -chest wall tender to palpation, ctm   -procal pending  -dc vanc zosyn, continue outpt doxy and levofloxacin  -crp 1.88 elevated   -fu cultures, Unable to check Legionella/strep pneumo as patient is anuric, so fu serum histoplasma antigen though low concern.           #Leucopenia, Thrombocytopenia possible infection vs immunosupression vs MGUS  ::Pt however with no fever, but reports feeling unwell , some vomiting which could be due to his gastroparesis  ::Hx of Kidney transplant on tacrolimus  ::hx of graft failure and chronic rejection  ::CXR with no concerns for infection  ::BNP 1042<< 2810  :: CT AP  2/24 Stable appearance of transplant kidney within the left iliac  fossa with edematous appearance and an adjacent fat stranding. There is a decompressed bladder with adjacent fat stranding, recommend correlation with urinalysis for UTI.  ::On Empiric antibiotics: doxycycline 1000 mg BID x 10 days+ levofloxacin renally dosed x 5days (started 2/24)  Plan:  -bld c/s, CMV, BK and EBV requested igg pos   -will start Vanc and Zosyn as pt immunosuppressed  -epo  -rsv/flu/covid  neg  -Transplant neph following        #Persistent abd pain iso Transplant kidney rejection  #ESRD on dialysis T Tu Sa  #Hx of two failed kidney transplants c/b chronic rejection  #Chronic immunosuppression  ::Pt was to fu with transplant nephro for planned nephrectomy of transplanted kidney, saw Dr Williamson 2/24 who started on Abx Doxycycline and Levofloxacin  Edema of the  transplanted kidney, treated with steroids in the past couple months.  -S/p renal transplant x 2, currently ESRD on hemodialysis.  -basline oliguirc to anuric  PLAN  - Transplant nephrology following, prednisone 5 mg daily, pending recs  - Continue tacrolimus 0.5 mg BID  - Tacrolimus level ordered fu in am  - low threshold for CT AP if pt worsening and with worsening abd pain  -tylenol sched for mild pain, oxy 5mg prn for moderate pain 10mg prn severe pain, can give dilaudid for break through pain  -lidocaine patches for neck, chest, and back pain   -prev nausea and vomiting, home reglan qtc 463  -fluid off with dialysis nephro 2/28, normal HD T/Th/Sa sched with left arm AVF, continue inpt  -dc vanc zosyn, continue outpt doxy and levofloxacin  -bp control as below   - patient is oliguric to anuric     #Gastroparesis  #Nausea and vomiting  -  restarted home metoclopramide  - will monitor     #IgG and IgA lambda MGUS  #Thrombocytopenia  - Thrombocytopenia chronic, Plt today 82<<149, may be decreased in the setting of infection vs. Immunosuppression vs. MGUS  PLAN  - CTM on daily CBC  - Continue home cinacalcet 30 mg daily  - pt stopped calcitriol outpt per HD nephrologist      #Hx of recent cataract surgery  - Continue home eye drops:  - Maxitrol 1 drop R eye q8h    - brimonidine 2% solution 1 drop R eye BID    - ketorolac 0.5% solution 1 drop R eye q8h     # Severe HTN likely due to missed morning meds dose vs severe abd pain  ::BP-200s<<157   ::Pt reports his Bps are usually high when he goes for dialysis  ::home  carvedilol 37.5 mg twice daily, Imdur 60 mg as well as nifedipine XR 90 mg twice daily  Received dose of clonidine in the ED,  Plan:  -cw home meds        #T2DM  - Most recent A1c 9.1 12/16/24  ::Bld glucose-348  ::On lantus 18units in am, scheduled 3 units tid?, ssi  Plan:  -ISS #2   -Beta-hydroxybutyrate levels-0.10  -Lantus 10 units in am  -ac at bedtime poct glucose      #HLD  - Continue home  pravastatin 10 mg daily     #GERD  - Continue home pantoprazole 40 mg daily     F: Replete PRN  E: Replete PRN  N: renal and carb controlled   A: PIV / left arm avf for hd     DVT ppx: Unfractionated heparin     Code Status: Full Code (confirmed on admission)   NOK:  Primary Emergency Contact: KETTY PLASENCIA Home Phone: [phone]            Bere Byers MD  IM PGY-3

## 2025-03-01 NOTE — PROCEDURES
"DIALYSIS NOTE:    Seen and examined during hemodialysis, undergoing treatment per submitted orders: 2 K, 2.5 Ca, 3.75  hours. Fluid removal 2 liters, as tolerated (keep SBP> 90mmHg).     /77   Pulse 66   Temp (!) 3 °C (37.4 °F)   Resp 16   Ht 1.727 m (5' 8\")   Wt 68 kg (150 lb)   SpO2 96%   BMI 22.81 kg/m²       Scheduled medications  acetaminophen, 975 mg, oral, TID  carvedilol, 37.5 mg, oral, BID  cefepime, 1 g, intravenous, q24h  cinacalcet, 30 mg, oral, Daily  [Held by provider] doxycylcine, 100 mg, oral, BID  epoetin aida or biosimilar, 10,000 Units, intravenous, Once per day on Tuesday Thursday Saturday  heparin (porcine), 5,000 Units, subcutaneous, q8h [facility]  insulin glargine, 12 Units, subcutaneous, q AM  insulin lispro, 0-10 Units, subcutaneous, TID AC  isosorbide mononitrate ER, 60 mg, oral, Daily  ketorolac, 1 drop, Right Eye, q8h  [Held by provider] levoFLOXacin, 500 mg, oral, q48h  lidocaine, 2 patch, transdermal, Daily  metoclopramide, 5 mg, oral, TID AC  neomycin-polymyxin-dexAMETHasone, 1 drop, Right Eye, q8h [facility]  NIFEdipine ER, 90 mg, oral, BID  pantoprazole, 40 mg, oral, Daily before breakfast  polyethylene glycol, 17 g, oral, Daily  pravastatin, 10 mg, oral, Nightly  predniSONE, 5 mg, oral, Daily  sevelamer carbonate, 800 mg, oral, TID AC  tacrolimus, 0.5 mg, oral, q12h [facility]  vitamin B complex-vitamin C-folic acid, 1 capsule, oral, Daily      Continuous medications     PRN medications  PRN medications: benzonatate, dextrose, dextrose, glucagon, glucagon, nitroglycerin, ondansetron, oxyCODONE, oxyCODONE, sodium chloride    Recent Results (from the past 24 hours)   POCT GLUCOSE    Collection Time: 02/28/25  5:34 PM   Result Value Ref Range    POCT Glucose 248 (H) 74 - 99 mg/dL   POCT GLUCOSE    Collection Time: 02/28/25 10:22 PM   Result Value Ref Range    POCT Glucose 177 (H) 74 - 99 mg/dL   CBC    Collection Time: 03/01/25  6:48 AM   Result Value Ref Range    WBC 2.1 (L) 4.4 - 11.3 " x10*3/uL    nRBC 0.0 0.0 - 0.0 /100 WBCs    RBC 3.23 (L) 4.50 - 5.90 x10*6/uL    Hemoglobin 9.0 (L) 13.5 - 17.5 g/dL    Hematocrit 27.3 (L) 41.0 - 52.0 %    MCV 85 80 - 100 fL    MCH 27.9 26.0 - 34.0 pg    MCHC 33.0 32.0 - 36.0 g/dL    RDW 15.4 (H) 11.5 - 14.5 %    Platelets 72 (L) 150 - 450 x10*3/uL   Renal function panel    Collection Time: 03/01/25  6:48 AM   Result Value Ref Range    Glucose 183 (H) 74 - 99 mg/dL    Sodium 134 (L) 136 - 145 mmol/L    Potassium 4.5 3.5 - 5.3 mmol/L    Chloride 95 (L) 98 - 107 mmol/L    Bicarbonate 27 21 - 32 mmol/L    Anion Gap 17 10 - 20 mmol/L    Urea Nitrogen 23 6 - 23 mg/dL    Creatinine 8.38 (H) 0.50 - 1.30 mg/dL    eGFR 6 (L) >60 mL/min/1.73m*2    Calcium 8.6 8.6 - 10.6 mg/dL    Phosphorus 4.1 2.5 - 4.9 mg/dL    Albumin 3.1 (L) 3.4 - 5.0 g/dL   Tacrolimus level    Collection Time: 03/01/25  6:48 AM   Result Value Ref Range    Tacrolimus  <2.0 <=15.0 ng/mL   Magnesium    Collection Time: 03/01/25  6:48 AM   Result Value Ref Range    Magnesium 1.93 1.60 - 2.40 mg/dL   POCT GLUCOSE    Collection Time: 03/01/25  7:38 AM   Result Value Ref Range    POCT Glucose 177 (H) 74 - 99 mg/dL   POCT GLUCOSE    Collection Time: 03/01/25 11:44 AM   Result Value Ref Range    POCT Glucose 134 (H) 74 - 99 mg/dL   CBC and Auto Differential    Collection Time: 03/01/25 12:47 PM   Result Value Ref Range    WBC 2.6 (L) 4.4 - 11.3 x10*3/uL    nRBC 0.0 0.0 - 0.0 /100 WBCs    RBC 3.28 (L) 4.50 - 5.90 x10*6/uL    Hemoglobin 9.1 (L) 13.5 - 17.5 g/dL    Hematocrit 28.1 (L) 41.0 - 52.0 %    MCV 86 80 - 100 fL    MCH 27.7 26.0 - 34.0 pg    MCHC 32.4 32.0 - 36.0 g/dL    RDW 15.6 (H) 11.5 - 14.5 %    Platelets 69 (L) 150 - 450 x10*3/uL    Neutrophils % 67.8 40.0 - 80.0 %    Immature Granulocytes %, Automated 0.4 0.0 - 0.9 %    Lymphocytes % 16.1 13.0 - 44.0 %    Monocytes % 11.1 2.0 - 10.0 %    Eosinophils % 4.2 0.0 - 6.0 %    Basophils % 0.4 0.0 - 2.0 %    Neutrophils Absolute 1.77 1.20 - 7.70 x10*3/uL     Immature Granulocytes Absolute, Automated 0.01 0.00 - 0.70 x10*3/uL    Lymphocytes Absolute 0.42 (L) 1.20 - 4.80 x10*3/uL    Monocytes Absolute 0.29 0.10 - 1.00 x10*3/uL    Eosinophils Absolute 0.11 0.00 - 0.70 x10*3/uL    Basophils Absolute 0.01 0.00 - 0.10 x10*3/uL

## 2025-03-01 NOTE — NURSING NOTE
Report from Sending RN:    Report From: AYO Gallegos  Recent Surgery of Procedure: No  Baseline Level of Consciousness (LOC): A/Ox4  Oxygen Use: Yes  Type: 2L O2 NC   Diabetic: Yes  Last BP Med Given Day of Dialysis: carvedilol  Last Pain Med Given: tylenol  Lab Tests to be Obtained with Dialysis: No  Blood Transfusion to be Given During Dialysis: No  Available IV Access: Yes  Medications to be Administered During Dialysis: No  Continuous IV Infusion Running: No  Restraints on Currently or in the Last 24 Hours: No  Hand-Off Communication: pt stable for HD  Dialysis Catheter Dressing: will assess once arrived to unit  Last Dressing Change: will assess once arrived to unit

## 2025-03-01 NOTE — CARE PLAN
The patient's goals for the shift include  VSS    The clinical goals for the shift include  Patient will remain HDS    Over the shift, the patient did not make progress toward the following goals. Barriers to progression include n/a. Recommendations to address these barriers include n/a.

## 2025-03-02 VITALS
DIASTOLIC BLOOD PRESSURE: 74 MMHG | WEIGHT: 150 LBS | HEART RATE: 66 BPM | OXYGEN SATURATION: 100 % | HEIGHT: 68 IN | SYSTOLIC BLOOD PRESSURE: 160 MMHG | TEMPERATURE: 97.7 F | RESPIRATION RATE: 14 BRPM | BODY MASS INDEX: 22.73 KG/M2

## 2025-03-02 LAB
ALBUMIN SERPL BCP-MCNC: 3.2 G/DL (ref 3.4–5)
ANION GAP SERPL CALC-SCNC: 13 MMOL/L (ref 10–20)
BACTERIA BLD CULT: NORMAL
BACTERIA BLD CULT: NORMAL
BASOPHILS # BLD AUTO: 0.01 X10*3/UL (ref 0–0.1)
BASOPHILS NFR BLD AUTO: 0.5 %
BKV DNA SERPL NAA+PROBE-LOG#: NORMAL {LOG_COPIES}/ML
BUN SERPL-MCNC: 14 MG/DL (ref 6–23)
CALCIUM SERPL-MCNC: 8.3 MG/DL (ref 8.6–10.6)
CHLORIDE SERPL-SCNC: 96 MMOL/L (ref 98–107)
CMV DNA SERPL NAA+PROBE-LOG IU: NORMAL {LOG_IU}/ML
CMV IGM SERPL-ACNC: <8 AU/ML
CO2 SERPL-SCNC: 33 MMOL/L (ref 21–32)
CREAT SERPL-MCNC: 5.87 MG/DL (ref 0.5–1.3)
EBV DNA SPEC NAA+PROBE-LOG#: NORMAL {LOG_COPIES}/ML
EGFRCR SERPLBLD CKD-EPI 2021: 10 ML/MIN/1.73M*2
EOSINOPHIL # BLD AUTO: 0.1 X10*3/UL (ref 0–0.7)
EOSINOPHIL NFR BLD AUTO: 4.6 %
ERYTHROCYTE [DISTWIDTH] IN BLOOD BY AUTOMATED COUNT: 15.2 % (ref 11.5–14.5)
ERYTHROCYTE [DISTWIDTH] IN BLOOD BY AUTOMATED COUNT: 15.2 % (ref 11.5–14.5)
GLUCOSE BLD MANUAL STRIP-MCNC: 100 MG/DL (ref 74–99)
GLUCOSE BLD MANUAL STRIP-MCNC: 105 MG/DL (ref 74–99)
GLUCOSE BLD MANUAL STRIP-MCNC: 109 MG/DL (ref 74–99)
GLUCOSE BLD MANUAL STRIP-MCNC: 165 MG/DL (ref 74–99)
GLUCOSE BLD MANUAL STRIP-MCNC: 59 MG/DL (ref 74–99)
GLUCOSE SERPL-MCNC: 135 MG/DL (ref 74–99)
HCT VFR BLD AUTO: 30.9 % (ref 41–52)
HCT VFR BLD AUTO: 30.9 % (ref 41–52)
HGB BLD-MCNC: 9.7 G/DL (ref 13.5–17.5)
HGB BLD-MCNC: 9.7 G/DL (ref 13.5–17.5)
IMM GRANULOCYTES # BLD AUTO: 0.02 X10*3/UL (ref 0–0.7)
IMM GRANULOCYTES NFR BLD AUTO: 0.9 % (ref 0–0.9)
LABORATORY COMMENT REPORT: NOT DETECTED
LYMPHOCYTES # BLD AUTO: 0.52 X10*3/UL (ref 1.2–4.8)
LYMPHOCYTES NFR BLD AUTO: 23.9 %
MAGNESIUM SERPL-MCNC: 2 MG/DL (ref 1.6–2.4)
MCH RBC QN AUTO: 27.1 PG (ref 26–34)
MCH RBC QN AUTO: 27.1 PG (ref 26–34)
MCHC RBC AUTO-ENTMCNC: 31.4 G/DL (ref 32–36)
MCHC RBC AUTO-ENTMCNC: 31.4 G/DL (ref 32–36)
MCV RBC AUTO: 86 FL (ref 80–100)
MCV RBC AUTO: 86 FL (ref 80–100)
MONOCYTES # BLD AUTO: 0.26 X10*3/UL (ref 0.1–1)
MONOCYTES NFR BLD AUTO: 11.9 %
NEUTROPHILS # BLD AUTO: 1.27 X10*3/UL (ref 1.2–7.7)
NEUTROPHILS NFR BLD AUTO: 58.2 %
NRBC BLD-RTO: 0 /100 WBCS (ref 0–0)
NRBC BLD-RTO: 0 /100 WBCS (ref 0–0)
PHOSPHATE SERPL-MCNC: 3.4 MG/DL (ref 2.5–4.9)
PLATELET # BLD AUTO: 71 X10*3/UL (ref 150–450)
PLATELET # BLD AUTO: 71 X10*3/UL (ref 150–450)
POTASSIUM SERPL-SCNC: 3.8 MMOL/L (ref 3.5–5.3)
PROT SERPL-MCNC: 6.5 G/DL (ref 6.4–8.2)
RBC # BLD AUTO: 3.58 X10*6/UL (ref 4.5–5.9)
RBC # BLD AUTO: 3.58 X10*6/UL (ref 4.5–5.9)
SODIUM SERPL-SCNC: 138 MMOL/L (ref 136–145)
TACROLIMUS BLD-MCNC: 2.1 NG/ML
VANCOMYCIN SERPL-MCNC: 10.8 UG/ML (ref 5–20)
WBC # BLD AUTO: 2.2 X10*3/UL (ref 4.4–11.3)
WBC # BLD AUTO: 2.2 X10*3/UL (ref 4.4–11.3)

## 2025-03-02 PROCEDURE — 99233 SBSQ HOSP IP/OBS HIGH 50: CPT | Performed by: INTERNAL MEDICINE

## 2025-03-02 PROCEDURE — 2500000001 HC RX 250 WO HCPCS SELF ADMINISTERED DRUGS (ALT 637 FOR MEDICARE OP)

## 2025-03-02 PROCEDURE — 36415 COLL VENOUS BLD VENIPUNCTURE: CPT | Performed by: NUTRITIONIST

## 2025-03-02 PROCEDURE — 2500000005 HC RX 250 GENERAL PHARMACY W/O HCPCS: Performed by: NUTRITIONIST

## 2025-03-02 PROCEDURE — 80197 ASSAY OF TACROLIMUS: CPT

## 2025-03-02 PROCEDURE — 80202 ASSAY OF VANCOMYCIN: CPT | Performed by: INTERNAL MEDICINE

## 2025-03-02 PROCEDURE — 82947 ASSAY GLUCOSE BLOOD QUANT: CPT

## 2025-03-02 PROCEDURE — 85027 COMPLETE CBC AUTOMATED: CPT

## 2025-03-02 PROCEDURE — 2500000001 HC RX 250 WO HCPCS SELF ADMINISTERED DRUGS (ALT 637 FOR MEDICARE OP): Performed by: NUTRITIONIST

## 2025-03-02 PROCEDURE — 87081 CULTURE SCREEN ONLY: CPT | Performed by: NUTRITIONIST

## 2025-03-02 PROCEDURE — 80069 RENAL FUNCTION PANEL: CPT

## 2025-03-02 PROCEDURE — 1100000001 HC PRIVATE ROOM DAILY

## 2025-03-02 PROCEDURE — 84155 ASSAY OF PROTEIN SERUM: CPT | Performed by: NUTRITIONIST

## 2025-03-02 PROCEDURE — 2500000004 HC RX 250 GENERAL PHARMACY W/ HCPCS (ALT 636 FOR OP/ED)

## 2025-03-02 PROCEDURE — 2500000005 HC RX 250 GENERAL PHARMACY W/O HCPCS

## 2025-03-02 PROCEDURE — 99223 1ST HOSP IP/OBS HIGH 75: CPT | Performed by: INTERNAL MEDICINE

## 2025-03-02 PROCEDURE — 86334 IMMUNOFIX E-PHORESIS SERUM: CPT | Performed by: NUTRITIONIST

## 2025-03-02 PROCEDURE — 86320 SERUM IMMUNOELECTROPHORESIS: CPT | Performed by: STUDENT IN AN ORGANIZED HEALTH CARE EDUCATION/TRAINING PROGRAM

## 2025-03-02 PROCEDURE — 2500000002 HC RX 250 W HCPCS SELF ADMINISTERED DRUGS (ALT 637 FOR MEDICARE OP, ALT 636 FOR OP/ED): Performed by: NUTRITIONIST

## 2025-03-02 PROCEDURE — 83735 ASSAY OF MAGNESIUM: CPT | Performed by: NUTRITIONIST

## 2025-03-02 PROCEDURE — 86481 TB AG RESPONSE T-CELL SUSP: CPT

## 2025-03-02 PROCEDURE — 86612 BLASTOMYCES ANTIBODY: CPT

## 2025-03-02 PROCEDURE — 2500000004 HC RX 250 GENERAL PHARMACY W/ HCPCS (ALT 636 FOR OP/ED): Performed by: NUTRITIONIST

## 2025-03-02 PROCEDURE — 84165 PROTEIN E-PHORESIS SERUM: CPT | Performed by: STUDENT IN AN ORGANIZED HEALTH CARE EDUCATION/TRAINING PROGRAM

## 2025-03-02 PROCEDURE — 99233 SBSQ HOSP IP/OBS HIGH 50: CPT | Performed by: NUTRITIONIST

## 2025-03-02 PROCEDURE — 2500000002 HC RX 250 W HCPCS SELF ADMINISTERED DRUGS (ALT 637 FOR MEDICARE OP, ALT 636 FOR OP/ED)

## 2025-03-02 PROCEDURE — 36415 COLL VENOUS BLD VENIPUNCTURE: CPT

## 2025-03-02 RX ORDER — CARVEDILOL 12.5 MG/1
37.5 TABLET ORAL 2 TIMES DAILY
Qty: 60 TABLET | Refills: 1 | Status: CANCELLED | OUTPATIENT
Start: 2025-03-02

## 2025-03-02 RX ORDER — VANCOMYCIN HYDROCHLORIDE 1 G/200ML
1000 INJECTION, SOLUTION INTRAVENOUS ONCE
Status: COMPLETED | OUTPATIENT
Start: 2025-03-02 | End: 2025-03-02

## 2025-03-02 RX ORDER — LIDOCAINE 560 MG/1
2 PATCH PERCUTANEOUS; TOPICAL; TRANSDERMAL DAILY
Qty: 30 PATCH | Refills: 1 | Status: CANCELLED | OUTPATIENT
Start: 2025-03-03

## 2025-03-02 RX ORDER — NEOMYCIN SULFATE, POLYMYXIN B SULFATE AND DEXAMETHASONE 3.5; 10000; 1 MG/ML; [USP'U]/ML; MG/ML
2 SUSPENSION/ DROPS OPHTHALMIC EVERY MORNING
Status: DISCONTINUED | OUTPATIENT
Start: 2025-03-02 | End: 2025-03-06 | Stop reason: HOSPADM

## 2025-03-02 RX ORDER — INSULIN GLARGINE 100 [IU]/ML
14 INJECTION, SOLUTION SUBCUTANEOUS EVERY MORNING
Status: DISCONTINUED | OUTPATIENT
Start: 2025-03-02 | End: 2025-03-03

## 2025-03-02 RX ORDER — PREDNISONE 5 MG/1
5 TABLET ORAL DAILY
Qty: 30 TABLET | Refills: 1 | Status: CANCELLED | OUTPATIENT
Start: 2025-03-03

## 2025-03-02 RX ORDER — PRAMOXINE HYDROCHLORIDE 10 MG/ML
LOTION TOPICAL 3 TIMES DAILY
Status: DISCONTINUED | OUTPATIENT
Start: 2025-03-02 | End: 2025-03-06 | Stop reason: HOSPADM

## 2025-03-02 RX ORDER — INSULIN LISPRO 100 [IU]/ML
1 INJECTION, SOLUTION INTRAVENOUS; SUBCUTANEOUS
Status: DISCONTINUED | OUTPATIENT
Start: 2025-03-02 | End: 2025-03-02

## 2025-03-02 RX ORDER — VANCOMYCIN HYDROCHLORIDE 1 G/20ML
INJECTION, POWDER, LYOPHILIZED, FOR SOLUTION INTRAVENOUS DAILY PRN
Status: DISCONTINUED | OUTPATIENT
Start: 2025-03-02 | End: 2025-03-04

## 2025-03-02 RX ADMIN — NEOMYCIN SULFATE, POLYMYXIN B SULFATE AND DEXAMETHASONE 1 DROP: 3.5; 10000; 1 SUSPENSION/ DROPS OPHTHALMIC at 06:10

## 2025-03-02 RX ADMIN — CARVEDILOL 37.5 MG: 12.5 TABLET, FILM COATED ORAL at 20:38

## 2025-03-02 RX ADMIN — INSULIN GLARGINE 14 UNITS: 100 INJECTION, SOLUTION SUBCUTANEOUS at 09:37

## 2025-03-02 RX ADMIN — PRAMOXINE HYDROCHLORIDE: 10 LOTION TOPICAL at 14:28

## 2025-03-02 RX ADMIN — KETOROLAC TROMETHAMINE 1 DROP: 5 SOLUTION OPHTHALMIC at 06:10

## 2025-03-02 RX ADMIN — METOCLOPRAMIDE 5 MG: 10 TABLET ORAL at 16:18

## 2025-03-02 RX ADMIN — CINACALCET HYDROCHLORIDE 30 MG: 30 TABLET, FILM COATED ORAL at 09:38

## 2025-03-02 RX ADMIN — PRAMOXINE HYDROCHLORIDE: 10 LOTION TOPICAL at 09:43

## 2025-03-02 RX ADMIN — PRAVASTATIN SODIUM 10 MG: 20 TABLET ORAL at 20:38

## 2025-03-02 RX ADMIN — HEPARIN SODIUM 5000 UNITS: 5000 INJECTION, SOLUTION INTRAVENOUS; SUBCUTANEOUS at 13:00

## 2025-03-02 RX ADMIN — CLOTRIMAZOLE: 10 CREAM TOPICAL at 20:38

## 2025-03-02 RX ADMIN — PRAMOXINE HYDROCHLORIDE: 10 LOTION TOPICAL at 20:41

## 2025-03-02 RX ADMIN — PANTOPRAZOLE SODIUM 40 MG: 40 TABLET, DELAYED RELEASE ORAL at 06:10

## 2025-03-02 RX ADMIN — HEPARIN SODIUM 5000 UNITS: 5000 INJECTION, SOLUTION INTRAVENOUS; SUBCUTANEOUS at 06:10

## 2025-03-02 RX ADMIN — HEPARIN SODIUM 5000 UNITS: 5000 INJECTION, SOLUTION INTRAVENOUS; SUBCUTANEOUS at 21:19

## 2025-03-02 RX ADMIN — OXYCODONE 10 MG: 5 TABLET ORAL at 17:43

## 2025-03-02 RX ADMIN — INSULIN LISPRO 2 UNITS: 100 INJECTION, SOLUTION INTRAVENOUS; SUBCUTANEOUS at 09:37

## 2025-03-02 RX ADMIN — ACETAMINOPHEN 975 MG: 325 TABLET ORAL at 09:38

## 2025-03-02 RX ADMIN — TACROLIMUS 0.5 MG: 0.5 CAPSULE ORAL at 06:10

## 2025-03-02 RX ADMIN — CARVEDILOL 37.5 MG: 12.5 TABLET, FILM COATED ORAL at 09:38

## 2025-03-02 RX ADMIN — TACROLIMUS 0.5 MG: 0.5 CAPSULE ORAL at 17:43

## 2025-03-02 RX ADMIN — NEOMYCIN SULFATE, POLYMYXIN B SULFATE AND DEXAMETHASONE 2 DROP: 3.5; 10000; 1 SUSPENSION/ DROPS OPHTHALMIC at 09:43

## 2025-03-02 RX ADMIN — VANCOMYCIN HYDROCHLORIDE 1000 MG: 1 INJECTION, SOLUTION INTRAVENOUS at 12:48

## 2025-03-02 RX ADMIN — ISOSORBIDE MONONITRATE 60 MG: 30 TABLET, EXTENDED RELEASE ORAL at 09:38

## 2025-03-02 RX ADMIN — SEVELAMER CARBONATE 800 MG: 800 TABLET, FILM COATED ORAL at 16:18

## 2025-03-02 RX ADMIN — METOCLOPRAMIDE 5 MG: 10 TABLET ORAL at 12:48

## 2025-03-02 RX ADMIN — CEFEPIME 1 G: 1 INJECTION, SOLUTION INTRAVENOUS at 22:00

## 2025-03-02 RX ADMIN — METOCLOPRAMIDE 5 MG: 10 TABLET ORAL at 09:38

## 2025-03-02 RX ADMIN — RENO CAPS 1 CAPSULE: 100; 1.5; 1.7; 20; 10; 1; 150; 5; 6 CAPSULE ORAL at 09:44

## 2025-03-02 RX ADMIN — SEVELAMER CARBONATE 800 MG: 800 TABLET, FILM COATED ORAL at 06:10

## 2025-03-02 RX ADMIN — NIFEDIPINE 90 MG: 90 TABLET, FILM COATED, EXTENDED RELEASE ORAL at 09:38

## 2025-03-02 RX ADMIN — ACETAMINOPHEN 975 MG: 325 TABLET ORAL at 20:38

## 2025-03-02 RX ADMIN — NIFEDIPINE 90 MG: 90 TABLET, FILM COATED, EXTENDED RELEASE ORAL at 20:40

## 2025-03-02 RX ADMIN — ACETAMINOPHEN 975 MG: 325 TABLET ORAL at 14:28

## 2025-03-02 RX ADMIN — LIDOCAINE 4% 2 PATCH: 40 PATCH TOPICAL at 09:41

## 2025-03-02 ASSESSMENT — PAIN DESCRIPTION - LOCATION: LOCATION: BACK

## 2025-03-02 ASSESSMENT — COGNITIVE AND FUNCTIONAL STATUS - GENERAL
DAILY ACTIVITIY SCORE: 24
MOBILITY SCORE: 24
MOBILITY SCORE: 24
DAILY ACTIVITIY SCORE: 24

## 2025-03-02 ASSESSMENT — PAIN SCALES - GENERAL
PAINLEVEL_OUTOF10: 0 - NO PAIN
PAINLEVEL_OUTOF10: 6

## 2025-03-02 NOTE — CARE PLAN
The patient's goals for the shift include  pain management, go home    The clinical goals for the shift include hds, no chest pain, no falls or injury      Problem: Safety - Adult  Goal: Free from fall injury  Outcome: Progressing

## 2025-03-02 NOTE — PROGRESS NOTES
"Manolo Bashir is a 69 y.o. male on day 3 of admission presenting with Acute chest pain.    Feels somewhat better this AM. No acute issues.   Chest pain resolved.  Pulm consulted for bronch/BAL  S/p HD yest.    Scheduled medications  acetaminophen, 975 mg, oral, TID  carvedilol, 37.5 mg, oral, BID  cefepime, 1 g, intravenous, q24h  cinacalcet, 30 mg, oral, Daily  clotrimazole, , Topical, BID  [Held by provider] doxycylcine, 100 mg, oral, BID  epoetin aida or biosimilar, 10,000 Units, intravenous, Once per day on Tuesday Thursday Saturday  heparin (porcine), 5,000 Units, subcutaneous, q8h [facility]  insulin glargine, 14 Units, subcutaneous, q AM  insulin lispro, 0-10 Units, subcutaneous, TID AC  isosorbide mononitrate ER, 60 mg, oral, Daily  [Held by provider] levoFLOXacin, 500 mg, oral, q48h  lidocaine, 2 patch, transdermal, Daily  metoclopramide, 5 mg, oral, TID AC  neomycin-polymyxin-dexAMETHasone, 2 drop, Left Eye, q AM  NIFEdipine ER, 90 mg, oral, BID  pantoprazole, 40 mg, oral, Daily before breakfast  polyethylene glycol, 17 g, oral, Daily  pramoxine, , Topical, TID  pravastatin, 10 mg, oral, Nightly  predniSONE, 5 mg, oral, Daily  sevelamer carbonate, 800 mg, oral, TID AC  tacrolimus, 0.5 mg, oral, q12h [facility]  vitamin B complex-vitamin C-folic acid, 1 capsule, oral, Daily      Continuous medications     PRN medications  PRN medications: benzonatate, dextrose, dextrose, glucagon, glucagon, nitroglycerin, ondansetron, oxyCODONE, oxyCODONE, sodium chloride, vancomycin    Last Recorded Vitals  Blood pressure 122/61, pulse 67, temperature 36.6 °C (97.9 °F), resp. rate 18, height 1.727 m (5' 8\"), weight 68 kg (150 lb), SpO2 97%.  Intake/Output last 3 Shifts:  I/O last 3 completed shifts:  In: 1530 (22.5 mL/kg) [P.O.:480; I.V.:600 (8.8 mL/kg); Other:400; IV Piggyback:50]  Out: 2400 (35.3 mL/kg) [Other:2400]  Weight: 68 kg     A&ox3, no distress, pleasant  MMM, no lesions  Lungs with equal air entry, no added sounds  Rrr, no " m/r/g  Abd soft, nt, nd  No allograft tenderness  No edema b/l    Results for orders placed or performed during the hospital encounter of 02/27/25 (from the past 24 hours)   POCT GLUCOSE   Result Value Ref Range    POCT Glucose 193 (H) 74 - 99 mg/dL   CBC   Result Value Ref Range    WBC 2.2 (L) 4.4 - 11.3 x10*3/uL    nRBC 0.0 0.0 - 0.0 /100 WBCs    RBC 3.58 (L) 4.50 - 5.90 x10*6/uL    Hemoglobin 9.7 (L) 13.5 - 17.5 g/dL    Hematocrit 30.9 (L) 41.0 - 52.0 %    MCV 86 80 - 100 fL    MCH 27.1 26.0 - 34.0 pg    MCHC 31.4 (L) 32.0 - 36.0 g/dL    RDW 15.2 (H) 11.5 - 14.5 %    Platelets 71 (L) 150 - 450 x10*3/uL   Renal function panel   Result Value Ref Range    Glucose 135 (H) 74 - 99 mg/dL    Sodium 138 136 - 145 mmol/L    Potassium 3.8 3.5 - 5.3 mmol/L    Chloride 96 (L) 98 - 107 mmol/L    Bicarbonate 33 (H) 21 - 32 mmol/L    Anion Gap 13 10 - 20 mmol/L    Urea Nitrogen 14 6 - 23 mg/dL    Creatinine 5.87 (H) 0.50 - 1.30 mg/dL    eGFR 10 (L) >60 mL/min/1.73m*2    Calcium 8.3 (L) 8.6 - 10.6 mg/dL    Phosphorus 3.4 2.5 - 4.9 mg/dL    Albumin 3.2 (L) 3.4 - 5.0 g/dL   Tacrolimus level   Result Value Ref Range    Tacrolimus  2.1 <=15.0 ng/mL   Magnesium   Result Value Ref Range    Magnesium 2.00 1.60 - 2.40 mg/dL   CBC and Auto Differential   Result Value Ref Range    WBC 2.2 (L) 4.4 - 11.3 x10*3/uL    nRBC 0.0 0.0 - 0.0 /100 WBCs    RBC 3.58 (L) 4.50 - 5.90 x10*6/uL    Hemoglobin 9.7 (L) 13.5 - 17.5 g/dL    Hematocrit 30.9 (L) 41.0 - 52.0 %    MCV 86 80 - 100 fL    MCH 27.1 26.0 - 34.0 pg    MCHC 31.4 (L) 32.0 - 36.0 g/dL    RDW 15.2 (H) 11.5 - 14.5 %    Platelets 71 (L) 150 - 450 x10*3/uL    Neutrophils % 58.2 40.0 - 80.0 %    Immature Granulocytes %, Automated 0.9 0.0 - 0.9 %    Lymphocytes % 23.9 13.0 - 44.0 %    Monocytes % 11.9 2.0 - 10.0 %    Eosinophils % 4.6 0.0 - 6.0 %    Basophils % 0.5 0.0 - 2.0 %    Neutrophils Absolute 1.27 1.20 - 7.70 x10*3/uL    Immature Granulocytes Absolute, Automated 0.02 0.00 - 0.70  x10*3/uL    Lymphocytes Absolute 0.52 (L) 1.20 - 4.80 x10*3/uL    Monocytes Absolute 0.26 0.10 - 1.00 x10*3/uL    Eosinophils Absolute 0.10 0.00 - 0.70 x10*3/uL    Basophils Absolute 0.01 0.00 - 0.10 x10*3/uL   Vancomycin   Result Value Ref Range    Vancomycin 10.8 5.0 - 20.0 ug/mL   POCT GLUCOSE   Result Value Ref Range    POCT Glucose 165 (H) 74 - 99 mg/dL   Protein, Total   Result Value Ref Range    Total Protein 6.5 6.4 - 8.2 g/dL   POCT GLUCOSE   Result Value Ref Range    POCT Glucose 109 (H) 74 - 99 mg/dL   POCT GLUCOSE   Result Value Ref Range    POCT Glucose 105 (H) 74 - 99 mg/dL     *Note: Due to a large number of results and/or encounters for the requested time period, some results have not been displayed. A complete set of results can be found in Results Review.           Assessment/Plan     69 y.o. male with ESRD attributed to hypertension since 1998 s/p kidney transplant #1 3/26/1992 that failed 11/13/2006), s/p kidney transplant #2 7/13/2013 which failed in May 2024 following which he has been on HD (TTS, [facility] via [facility]) . He also has known MGUS with IgG and IgA lambda (bone marrow bx 10/2023 with no plasma cell dyscrasia), DM2, HTN, prostate cancer s/p radical prostatectomy, baseline urinary incontinence, HCV s/p treatment (recent HCV PCR negative 7/2024).       Recent admissions for graft intolerance s/p steroid pulse with improvement in symptoms, C diff colitis. Being evaluated for graft nephrectomy. Started on empiric PO abx (levo + doxy) for suspected pneumonia.      Admitted with chest pain, cough, congestion. ACS ruled out. Txp neph consulted for further eval and management, possible nephrectomy this admission.     ESRD s/p failed kidney x2, on HD since 5/2024  - HD TTS via [facility].   - RRT management per ESRD service. Optimize UF as tolerated.      Anemia/WBC:   - on DARIANA. Hb 9.7  - leukopenic this admission. Pls check CBC w/ differential. Consider Neupogen for ANC <0.5.  - check SPEP      Immunosuppression:   - cont low dose tac 0.5mg bid, pred 5mg/d.     ID:  - CT chest with persistent b/l GGO  - ID consulted. eval for opportunistic infections ongoing.   - pulm consulted for bronch/BAL  - CMV, EBV, BK pcr negative  - on cefepime currently     - Pt will benefit from nephrectomy. Txp surgery aware of pt's admission and monitoring infectious etiology work up.     Will follow peripherally. Pls call with any questions or concerns      Raad Rolle MD

## 2025-03-02 NOTE — PROGRESS NOTES
Manolo Bashir is a 69 y.o. male on day 3 of admission presenting with Acute chest pain.      Subjective   Pt complaining of his chronic pain (improved). Discussed eyedrops with overnight team, so adjusted to left eye only at scheduled 2 drops in the am. Pt feels he is overall breathing better and in less over pain. Though he still has some left abd pain. Pt also complaining of itching on his back.     Objective     Last Recorded Vitals  /64   Pulse 69   Temp 37.1 °C (98.8 °F)   Resp 16   Wt 68 kg (150 lb)   SpO2 100%   Intake/Output last 3 Shifts:    Intake/Output Summary (Last 24 hours) at 3/2/2025 1236  Last data filed at 3/1/2025 1800  Gross per 24 hour   Intake 1290 ml   Output 2400 ml   Net -1110 ml       Admission Weight  Weight: 68 kg (150 lb) (02/27/25 0721)    Daily Weight  02/27/25 : 68 kg (150 lb)    Image Results  ECG 12 lead  Normal sinus rhythm  Right axis deviation  Nonspecific intraventricular block  Abnormal ECG  When compared with ECG of 31-JAN-2025 12:19,  QRS axis Shifted right    See ED provider note for full interpretation and clinical correlation  Confirmed by Millicent hSeikh ([id number]) on 2/27/2025 10:50:01 PM  ECG 12 Lead  Normal sinus rhythm  Right axis deviation  Nonspecific intraventricular block  Abnormal ECG  When compared with ECG of 27-FEB-2025 06:58,  No significant change was found    See ED provider note for full interpretation and clinical correlation  Confirmed by Millicent Sheikh ([id number]) on 2/27/2025 10:49:54 PM  ECG 12 lead  Normal sinus rhythm  Left axis deviation  Left bundle branch block  Abnormal ECG  When compared with ECG of 27-FEB-2025 06:59,  QRS axis Shifted left  Nonspecific T wave abnormality now evident in Lateral leads    See ED provider note for full interpretation and clinical correlation  Confirmed by Millicent Sheikh ([id number]) on 2/27/2025 10:49:41 PM  XR chest 2 views  Narrative: STUDY:  Chest Radiographs;  2/27/2025 8:24AM  INDICATION:  Chest  pain.  COMPARISON:  4/3/2024 XR Chest.  ACCESSION NUMBER(S):  [id number]  ORDERING CLINICIAN:  NO ELIZABETH  TECHNIQUE:  Frontal and lateral chest.   FINDINGS:  CARDIOMEDIASTINAL SILHOUETTE:  Cardiomediastinal silhouette is normal in size and configuration.     LUNGS:  Lungs are clear.     ABDOMEN:  No remarkable upper abdominal findings.     BONES:  No acute osseous changes.  Impression: No acute cardiopulmonary disease.  Signed by Jacques Valdez MD      Physical Exam  Constitutional:       General: He is not in acute distress.     Appearance: Normal appearance.   HENT:      Head: Normocephalic and atraumatic.   Eyes:      General:         Right eye: No discharge.         Left eye: No discharge.      Extraocular Movements: Extraocular movements intact.   Cardiovascular:      Rate and Rhythm: Normal rate and regular rhythm.      Heart sounds: No murmur heard.     No friction rub. No gallop.   Pulmonary:      Effort: Pulmonary effort is normal. No respiratory distress.      Breath sounds: No wheezing or rhonchi.   Chest:      Chest wall: No tenderness.   Abdominal:      General: Abdomen is flat. Bowel sounds are normal.      Palpations: Abdomen is soft.      Tenderness: There is abdominal tenderness.      Comments: Left abd tenderness and scars from prior surgery    Musculoskeletal:         General: No tenderness.      Right lower leg: No edema.      Left lower leg: No edema.      Comments: Nodules on b/l arms, left arm avf.    Skin:     General: Skin is warm and dry.      Comments: Pruritus on his back with excoriations    Neurological:      General: No focal deficit present.      Mental Status: He is alert and oriented to person, place, and time. Mental status is at baseline.   Psychiatric:         Mood and Affect: Mood normal.         Behavior: Behavior normal.         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  Patient's chest pain unlikely ACS as EKG and trops negative but responded to nitroglycerin and aspirin. BNP elevated and chest tender to palpation on exam. CT and exam showing fluid up, so pt went to dialysis 2/28 fluid removal, rsv/covid/flu neg, pt with cough +/- productive, after 3 sessions of HD along with starting antibiotics pt has has improvement in resp status. procal: 0.24 rsv/flu/covid  neg crp 1.88 elevated. In preparation for nephrectomy iso immunocompromise and pt with recurrent resp infections we are testing for atypicals and consulted pulm for a potential bronch to ensure we treat any infection that may exist prior to surgery. Overall pt pain and resp status are improved.     Dispo: pending pt/ot eval and medically ready.         Updates:  -cw HD Tu Th Sa using left arm fistula, 3/1 got 2L off and bp/hr wnl  -increased lantus to 14 from 12 (home 18), still holding scheduled (home 3 units tid), cw ssi   -started sarna lotion for back pruritus   -restarted vanc 3/2 +/- cw cefepime, pending ID recs  -consulted pulm 3/2 for consideration of bronch for further infectious workup iso immunocompromise, recurrent resp infections, nephritis likely requiring removal of transplant kidney   -per transplant nephrology recs, there is ongoing c/f infection so will broaden abx to cefepime (from doxy/levofloxacin that pt was prescribed at outpt appt). Goal is to optimize patient for potential inpatient nephrectomy    -pending histoplasma ag, fungitell b d glucan, spep, mrsa pcr, aspergillus galactomannan, blastomyces antibody, afb, resp viral panel, t spot, EBV/CMV/BK DNA. Legionella/strep pneumo urine non yet collected as patient is oliguric to anuric  -blood cx 2/27  ngtd   -per transplant Consider Neupogen for ANC <0.5.   -pt/ot ordered         #Chest Pain, 2/2 Edema vs Resp Infection, improving   #HFpEF, diastolic dysfunction  #Cough, improving  #SOB, resolved  ::Pt with ESRD high risk for CAD,  sharp CP in sternal region, brought on by exertion and relieved w/nitroglycerin patient however no characteristic ACS chest pain, Trops and EKG however negative, no concerns for Pericarditis, trops neg, bnp elevated, afebrile, leukopenia, immunocompromise, recent steroid burst with taper, still on tacrolimus currently.  Has been on levofloxacin and doxycycline past few days, prescribed outpatient by transplant nephrology.  ::Heart score of 4  ::ED reported tenderness on palpation of chest at presentation with concern for possible MSK pain  ::pT currently denies any CP  -rsv/flu/covid  neg, crp 1.88 elevated, procal: 0.24   Plan:  -sublingual nitroglycerin prn for pain  -sched tylenol and scheduled lidocaine patches for neck, chest, and back pain   -prev nausea and vomiting, reglan qtc 463  -tessalon pearls started for cough +/- productive   -cw ns spray    -cw HD Tu Th Sa using left arm fistula, 3/1 got 2L off and bp/hr wnl  -restarted vanc 3/2 +/- cw cefepime, pending ID recs  -consulted pulm 3/2 for consideration of bronch for further infectious workup iso immunocompromise, recurrent resp infections, nephritis likely requiring removal of transplant kidney   -per transplant nephrology recs, there is ongoing c/f infection so will broaden abx to cefepime (from doxy/levofloxacin that pt was prescribed at outpt appt). Goal is to optimize patient for potential inpatient nephrectomy    -pending histoplasma ag, fungitell b d glucan, spep, mrsa pcr, aspergillus galactomannan, blastomyces antibody, afb, resp viral panel, t spot, EBV/CMV/BK DNA. 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  -S/p renal transplant x 2, currently ESRD on hemodialysis.  -basline oliguirc to anuric  PLAN  - Transplant nephrology following, prednisone 5 mg daily, pending recs  - Continue tacrolimus 0.5 mg BID  - Tacrolimus level trending   - low threshold for CT AP if pt worsening and with worsening abd pain  -tylenol sched for mild pain, oxy 5mg prn for moderate pain 10mg prn severe pain, can give dilaudid for break through pain  -lidocaine patches for neck, chest, and back pain   -prev nausea and vomiting, home reglan qtc 463  -bp control as below   - patient is oliguric to anuric  - pt stopped calcitriol outpt per HD nephrologist   -cw HD Tu Th Sa using left arm fistula, 3/1 got 2L off and bp/hr wnl  -restarted vanc 3/2 +/- cw cefepime, pending ID recs  -per transplant nephrology recs, there is ongoing c/f infection so will broaden abx to cefepime (from doxy/levofloxacin that pt was prescribed at outpt appt). Goal is to optimize patient for potential inpatient nephrectomy    -infectious workup as above        #Gastroparesis  #Nausea and vomiting  -  cw restarted home metoclopramide  - will monitor     #Leucopenia  #IgG and IgA lambda MGUS  #Thrombocytopenia, unresolved   - Thrombocytopenia chronic, Plt today 82<<149, may be decreased in the setting of infection vs. Immunosuppression vs. MGUS  PLAN  - CTM on daily CBC  - Continue home cinacalcet 30 mg daily  - pt stopped calcitriol outpt per HD nephrologist   -infectious management as above   -per transplant Consider Neupogen for ANC <0.5.      #Hx of recent cataract surgery  Plan:  - Cw home Maxitrol 1 drop L eye  qAM      # Severe HTN, controlled   ::likely due to missed morning meds dose vs severe abd pain  ::BP-200s<<157   ::Pt reports his Bps are usually high when he goes for dialysis  ::home  carvedilol 37.5 mg twice daily, Imdur 60 mg as well as nifedipine XR 90 mg twice daily  Received dose of clonidine in the ED,  Plan:  -cw home meds     #T2DM  - Most recent A1c 9.1 12/16/24  ::Bld glucose-348  ::On lantus 18units in am, scheduled 3 units tid, ssi  -Beta-hydroxybutyrate levels-0.10  Plan:  -ISS #2   -ac and at bedtime poct glucose   -increased lantus to 14 from 12 (home 18), still holding scheduled (home 3 units tid), cw ssi   -carb controlled and renal diet      #HLD  - Continue home pravastatin 10 mg daily     #GERD  - Continue home pantoprazole 40 mg daily    #Pruritus  Plan:  -started sarna lotion for back pruritus        F: Replete PRN  E: Replete PRN  N: renal and carb controlled   A: PIV / left arm avf for hd     DVT ppx: Unfractionated heparin     Code Status: Full Code (confirmed on admission)   NOK:  Primary Emergency Contact: KETTY PLASENCIA, Home Phone: [phone]        Constance Rapp MD  IM PGY-1

## 2025-03-02 NOTE — CONSULTS
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 Pulm consulted for GGO and concern for opportunistic infection.    - Multiple hospitalizations with shortness of breath and waxing and waning pulmonary ground glass infiltrates.  - Patient endorses that typically he feels improvement with antibiotics.  - Of not CT chest shows sings of pulmonary edema with interseptal thickening, bilateral effusions and the ground glass  - endorses compliance with dialysis and completing the full sessions without interruptions  - He had a recurrent bilateral pleural effusions since at least 5/2024, although R always > L. He had two therapeutic thoracentesis on 8/26/2024 and 9/12/2024, showing mainly transudative fluid although serosanguinous and cloudy.  - Mr. Bashir admission this time was mainly due to the chest pain, he denies and respiratory symptoms.  - He although endorses dyspnea that comes and goes for days now.    Past Medical History  He has a past medical history of Anemia, Arthritis, Cataract, Chronic kidney disease, stage 3 unspecified (Multi) (09/26/2018), CKD (chronic kidney disease), Cough (02/12/2024), COVID-19 (06/18/2020), Diabetes (Multi), ESRD (end stage renal disease) (Multi), Focal and segmental proliferative glomerulonephritis (12/23/2023), HTN (hypertension), Hyperlipidemia, Other long term (current) drug therapy (07/20/2021), Personal history of other diseases of the circulatory system, Personal history of other infectious and parasitic diseases (08/17/2015), Polyp, colonic (08/17/2023), Primary osteoarthritis of both ankles (08/17/2023), Prostate cancer (Multi), Tubular adenoma of colon (08/17/2023), and  "Unspecified kidney failure (08/17/2016).    Surgical History  He has a past surgical history that includes Prostatectomy (10/11/2013); Ileostomy (04/25/2017); Other surgical history (04/21/2017); Ileostomy closure (08/17/2015); Other surgical history (08/17/2015); Other surgical history (08/17/2015); US guided percutaneous peritoneal or retroperitoneal fluid collection drainage (10/20/2022); transplant, kidney, open (1992); transplant, kidney, open (2013); and US guided percutaneous biopsy renal left (Left, 11/20/2023).     Social History  He reports that he has never smoked. He has been exposed to tobacco smoke. He has never used smokeless tobacco.  Drug: Oxycodone. He reports that he does not drink alcohol.    Family History  Family History   Problem Relation Name Age of Onset    Bone cancer Mother      Other (corona's sarcome of the bone marrow) Mother      Prostate cancer Father      Diabetes Other Family Hist     Hypertension Other Family Hist         Allergies  Patient has no known allergies.    Review of Systems   -ve except for HPI     Physical Exam  GENERAL APPEARANCE: 69-year-old AA male, alert and cooperative, and appears to be in no acute distress.  HEAD: normocephalic  THROAT: Oral mucosa moist, no oral lesions seen.   CARDIAC: Regular rate and rhythm. + murmur appreciated.   LUNGS: Scattered crackles bilaterally. No wheezing. Normal work of breathing.  ABDOMEN: Positive bowel sounds. Soft, nondistended, tender to palpation in LLQ, LLQ renal allograft palpated.  NEUROLOGICAL: No obvious focal deficits.  Moving all 4 extremities, speech clear  SKIN: Skin warm and dry.  Midline superior aspect of anterior scrotum with macerated macular white plaques   PSYCHIATRIC: Flat affect.  LINES/TUBES/CATHETERS: LUE AV fistula + AV graft, thrill appreciated, bruit heard     Last Recorded Vitals  Blood pressure 141/64, pulse 69, temperature 37.1 °C (98.8 °F), resp. rate 16, height 1.727 m (5' 8\"), weight 68 kg (150 " lb), SpO2 100%.    Relevant Results  CT 2/24/2025  CHEST:  1. Interval development of bilateral ground-glass opacities, given  patient's history of renal failure, cardiomegaly, and moderate right  and small left pleural effusion these are favored to represent  pulmonary edema rather than multifocal infection however this can not  be ruled out. Repeat scan is recommended in 8-12 weeks to ensure  resolution.  2. The pulmonary artery is dilated measuring 3.5 cm in maximum  diameter, recommend correlation for pulmonary hypertension.      ABDOMEN-PELVIS:  1.  Stable appearance of transplant kidney within the left iliac  fossa with edematous appearance and an adjacent fat stranding.  2. There is a decompressed bladder with adjacent fat stranding,  recommend correlation with urinalysis for UTI.         Assessment/Plan   Manolo Bashir is a 69 year old with ESRD from hypertension s/p 2 kidney transplants (1998 and 2013) that failed in may 2024 now on HD (T/TH/S) through [facility]), MGUS, T2DM, HTN, prostate cancer s/p prostatectomy, urine incontinence who presented with CP 2/27. Pulm consulted for GGO and concern for opportunistic infection.    Mr. Bashir CT scans are most consistent with pulmonary edema with enlarging bilateral pleural effusion (right > left) from his renal and cardiac disease. Opportunistic infection while not excluded is less likely. Of note, he has recurrent serosanguinous pleural effusion in the right hemithorax. Both thoracentesis in 8/2025 and 9/2025 weren't submitted for cytology with flow cytometry which is prudent to r/o malignancy like PTLD.     Recommendation:  - Would plan for diagnostic and therapeutic thoracentesis as an add on tomorrow 3/3/2025 if there is a big enough pocket  - Would defer bronchoscopy with BAL for now, due to the low likelihood for infection.  - Please hold prophylaxis heparin the day of procedure    Case seen and discussed with Dr. Alvino Degroot MD    ======  I  saw and evaluated the patient. I personally obtained the key and critical portions of the history and physical exam or was physically present for key and critical portions performed by the resident/fellow. I reviewed the resident/fellow's documentation and discussed the patient with the resident/fellow. I agree with the resident/fellow's medical decision making as documented in the note.    Reji De Oliveira MD

## 2025-03-02 NOTE — CONSULTS
Vancomycin Dosing by Pharmacy- INITIAL    Manolo Bashir is a 69 y.o. year old male who Pharmacy has been consulted for vancomycin dosing for pneumonia. Based on the patient's indication and renal status this patient will be dosed based on a goal pre-HD level of 20-25.     Renal function: ESRD on iHD (TThSat), Last HD session 3/1.    Visit Vitals  /64   Pulse 69   Temp 37.1 °C (98.8 °F)   Resp 16        Lab Results   Component Value Date    CREATININE 5.87 (H) 2025    CREATININE 8.38 (H) 2025    CREATININE 6.79 (H) 2025    CREATININE 9.60 (H) 2025        Patient weight is as follows:   Vitals:    25 0721   Weight: 68 kg (150 lb)       Cultures:  No results found for the encounter in last 14 days.        I/O last 3 completed shifts:  In: 1530 (22.5 mL/kg) [P.O.:480; I.V.:600 (8.8 mL/kg); Other:400; IV Piggyback:50]  Out: 2400 (35.3 mL/kg) [Other:2400]  Weight: 68 kg   I/O during current shift:  No intake/output data recorded.    Temp (24hrs), Av.9 °C (87.6 °F), Min:3 °C (37.4 °F), Max:37.1 °C (98.8 °F)         Assessment/Plan     Patient received loading dose previously on  PM of 1750 mg IV x1. Random level checked prior to re-starting vancomycin dosing on 3/2 = 10.8 ug/mL.  Will partially re-load with 1000 mg IV x1, and follow up HD plan for further post-HD dose planning.     Follow-up level will be ordered on 3/6 at AM labs unless clinically indicated sooner.  Will continue to monitor renal function daily while on vancomycin and order serum creatinine at least every 48 hours if not already ordered.  Follow for continued vancomycin needs, clinical response, and signs/symptoms of toxicity.       Chris Foster, PharmD

## 2025-03-02 NOTE — CARE PLAN
The patient's goals for the shift include      The clinical goals for the shift include pt will have no complaints of nchest pain through end of shift    Pt complaining of not getting adequate sleep d/t being woken up by staff overnight and this AM.  MRSA swab sent. Pt started on vancomycin IVP.  VSS, pt A&Ox4, only complaint of back pain, lidocaine patches placed in AM and 10mg oxy given this evening. Pt set to transfer to [facility] d/t not having a tele order, report called, awaiting transport.

## 2025-03-03 ENCOUNTER — APPOINTMENT (OUTPATIENT)
Dept: GASTROENTEROLOGY | Facility: HOSPITAL | Age: 70
End: 2025-03-03
Payer: MEDICARE

## 2025-03-03 LAB
ALBUMIN SERPL BCP-MCNC: 3.2 G/DL (ref 3.4–5)
ANION GAP SERPL CALC-SCNC: 18 MMOL/L (ref 10–20)
BACTERIA BLD CULT: NORMAL
BACTERIA BLD CULT: NORMAL
BASOPHILS # BLD AUTO: 0.02 X10*3/UL (ref 0–0.1)
BASOPHILS NFR BLD AUTO: 0.4 %
BUN SERPL-MCNC: 20 MG/DL (ref 6–23)
CALCIUM SERPL-MCNC: 8.4 MG/DL (ref 8.6–10.6)
CHLORIDE SERPL-SCNC: 96 MMOL/L (ref 98–107)
CO2 SERPL-SCNC: 26 MMOL/L (ref 21–32)
CREAT SERPL-MCNC: 8.5 MG/DL (ref 0.5–1.3)
EGFRCR SERPLBLD CKD-EPI 2021: 6 ML/MIN/1.73M*2
EOSINOPHIL # BLD AUTO: 0.21 X10*3/UL (ref 0–0.7)
EOSINOPHIL NFR BLD AUTO: 4.4 %
ERYTHROCYTE [DISTWIDTH] IN BLOOD BY AUTOMATED COUNT: 15 % (ref 11.5–14.5)
GLUCOSE BLD MANUAL STRIP-MCNC: 104 MG/DL (ref 74–99)
GLUCOSE BLD MANUAL STRIP-MCNC: 115 MG/DL (ref 74–99)
GLUCOSE BLD MANUAL STRIP-MCNC: 180 MG/DL (ref 74–99)
GLUCOSE BLD MANUAL STRIP-MCNC: 224 MG/DL (ref 74–99)
GLUCOSE BLD MANUAL STRIP-MCNC: 52 MG/DL (ref 74–99)
GLUCOSE SERPL-MCNC: 40 MG/DL (ref 74–99)
HCT VFR BLD AUTO: 33.5 % (ref 41–52)
HGB BLD-MCNC: 10.7 G/DL (ref 13.5–17.5)
IMM GRANULOCYTES # BLD AUTO: 0.02 X10*3/UL (ref 0–0.7)
IMM GRANULOCYTES NFR BLD AUTO: 0.4 % (ref 0–0.9)
LYMPHOCYTES # BLD AUTO: 1.03 X10*3/UL (ref 1.2–4.8)
LYMPHOCYTES NFR BLD AUTO: 21.8 %
MAGNESIUM SERPL-MCNC: 2.01 MG/DL (ref 1.6–2.4)
MCH RBC QN AUTO: 27.9 PG (ref 26–34)
MCHC RBC AUTO-ENTMCNC: 31.9 G/DL (ref 32–36)
MCV RBC AUTO: 87 FL (ref 80–100)
MONOCYTES # BLD AUTO: 0.52 X10*3/UL (ref 0.1–1)
MONOCYTES NFR BLD AUTO: 11 %
NEUTROPHILS # BLD AUTO: 2.92 X10*3/UL (ref 1.2–7.7)
NEUTROPHILS NFR BLD AUTO: 62 %
NRBC BLD-RTO: 0 /100 WBCS (ref 0–0)
PHOSPHATE SERPL-MCNC: 2.7 MG/DL (ref 2.5–4.9)
PLATELET # BLD AUTO: 90 X10*3/UL (ref 150–450)
POTASSIUM SERPL-SCNC: 4.1 MMOL/L (ref 3.5–5.3)
RBC # BLD AUTO: 3.84 X10*6/UL (ref 4.5–5.9)
SODIUM SERPL-SCNC: 136 MMOL/L (ref 136–145)
STAPHYLOCOCCUS SPEC CULT: NORMAL
TACROLIMUS BLD-MCNC: <2 NG/ML
WBC # BLD AUTO: 4.7 X10*3/UL (ref 4.4–11.3)

## 2025-03-03 PROCEDURE — 85025 COMPLETE CBC W/AUTO DIFF WBC: CPT | Performed by: NUTRITIONIST

## 2025-03-03 PROCEDURE — 2500000001 HC RX 250 WO HCPCS SELF ADMINISTERED DRUGS (ALT 637 FOR MEDICARE OP)

## 2025-03-03 PROCEDURE — 80197 ASSAY OF TACROLIMUS: CPT

## 2025-03-03 PROCEDURE — 80069 RENAL FUNCTION PANEL: CPT

## 2025-03-03 PROCEDURE — 99233 SBSQ HOSP IP/OBS HIGH 50: CPT | Performed by: NUTRITIONIST

## 2025-03-03 PROCEDURE — 86481 TB AG RESPONSE T-CELL SUSP: CPT | Performed by: NUTRITIONIST

## 2025-03-03 PROCEDURE — 2500000004 HC RX 250 GENERAL PHARMACY W/ HCPCS (ALT 636 FOR OP/ED)

## 2025-03-03 PROCEDURE — 36415 COLL VENOUS BLD VENIPUNCTURE: CPT | Performed by: NUTRITIONIST

## 2025-03-03 PROCEDURE — 83735 ASSAY OF MAGNESIUM: CPT | Performed by: NUTRITIONIST

## 2025-03-03 PROCEDURE — 99233 SBSQ HOSP IP/OBS HIGH 50: CPT | Performed by: NURSE PRACTITIONER

## 2025-03-03 PROCEDURE — 82947 ASSAY GLUCOSE BLOOD QUANT: CPT

## 2025-03-03 PROCEDURE — 2500000001 HC RX 250 WO HCPCS SELF ADMINISTERED DRUGS (ALT 637 FOR MEDICARE OP): Performed by: NUTRITIONIST

## 2025-03-03 PROCEDURE — 1100000001 HC PRIVATE ROOM DAILY

## 2025-03-03 PROCEDURE — 2500000002 HC RX 250 W HCPCS SELF ADMINISTERED DRUGS (ALT 637 FOR MEDICARE OP, ALT 636 FOR OP/ED)

## 2025-03-03 PROCEDURE — 99232 SBSQ HOSP IP/OBS MODERATE 35: CPT | Performed by: STUDENT IN AN ORGANIZED HEALTH CARE EDUCATION/TRAINING PROGRAM

## 2025-03-03 PROCEDURE — 2500000005 HC RX 250 GENERAL PHARMACY W/O HCPCS: Performed by: NUTRITIONIST

## 2025-03-03 RX ORDER — INSULIN GLARGINE 100 [IU]/ML
13 INJECTION, SOLUTION SUBCUTANEOUS EVERY MORNING
Status: DISCONTINUED | OUTPATIENT
Start: 2025-03-03 | End: 2025-03-03

## 2025-03-03 RX ORDER — INSULIN GLARGINE 100 [IU]/ML
10 INJECTION, SOLUTION SUBCUTANEOUS EVERY MORNING
Status: DISCONTINUED | OUTPATIENT
Start: 2025-03-04 | End: 2025-03-05

## 2025-03-03 RX ORDER — INSULIN LISPRO 100 [IU]/ML
0-5 INJECTION, SOLUTION INTRAVENOUS; SUBCUTANEOUS
Status: DISPENSED | OUTPATIENT
Start: 2025-03-03 | End: 2025-03-06

## 2025-03-03 RX ORDER — OXYCODONE HYDROCHLORIDE 5 MG/1
5 TABLET ORAL ONCE
Status: DISCONTINUED | OUTPATIENT
Start: 2025-03-03 | End: 2025-03-05 | Stop reason: ALTCHOICE

## 2025-03-03 RX ADMIN — ISOSORBIDE MONONITRATE 60 MG: 30 TABLET, EXTENDED RELEASE ORAL at 09:24

## 2025-03-03 RX ADMIN — CARVEDILOL 37.5 MG: 12.5 TABLET, FILM COATED ORAL at 09:25

## 2025-03-03 RX ADMIN — CEFEPIME 1 G: 1 INJECTION, SOLUTION INTRAVENOUS at 21:28

## 2025-03-03 RX ADMIN — PRAMOXINE HYDROCHLORIDE: 10 LOTION TOPICAL at 21:27

## 2025-03-03 RX ADMIN — SEVELAMER CARBONATE 800 MG: 800 TABLET, FILM COATED ORAL at 09:21

## 2025-03-03 RX ADMIN — TACROLIMUS 0.5 MG: 0.5 CAPSULE ORAL at 19:14

## 2025-03-03 RX ADMIN — PRAVASTATIN SODIUM 10 MG: 20 TABLET ORAL at 21:26

## 2025-03-03 RX ADMIN — INSULIN LISPRO 2 UNITS: 100 INJECTION, SOLUTION INTRAVENOUS; SUBCUTANEOUS at 17:34

## 2025-03-03 RX ADMIN — CINACALCET HYDROCHLORIDE 30 MG: 30 TABLET, FILM COATED ORAL at 09:27

## 2025-03-03 RX ADMIN — NIFEDIPINE 90 MG: 90 TABLET, FILM COATED, EXTENDED RELEASE ORAL at 09:28

## 2025-03-03 RX ADMIN — METOCLOPRAMIDE 5 MG: 10 TABLET ORAL at 16:50

## 2025-03-03 RX ADMIN — TACROLIMUS 0.5 MG: 0.5 CAPSULE ORAL at 06:17

## 2025-03-03 RX ADMIN — CARVEDILOL 37.5 MG: 12.5 TABLET, FILM COATED ORAL at 21:26

## 2025-03-03 RX ADMIN — ACETAMINOPHEN 975 MG: 325 TABLET ORAL at 09:24

## 2025-03-03 RX ADMIN — CLOTRIMAZOLE: 10 CREAM TOPICAL at 21:31

## 2025-03-03 RX ADMIN — METOCLOPRAMIDE 5 MG: 10 TABLET ORAL at 12:42

## 2025-03-03 RX ADMIN — SEVELAMER CARBONATE 800 MG: 800 TABLET, FILM COATED ORAL at 16:50

## 2025-03-03 RX ADMIN — NIFEDIPINE 90 MG: 90 TABLET, FILM COATED, EXTENDED RELEASE ORAL at 21:27

## 2025-03-03 RX ADMIN — SEVELAMER CARBONATE 800 MG: 800 TABLET, FILM COATED ORAL at 12:42

## 2025-03-03 RX ADMIN — INSULIN LISPRO 1 UNITS: 100 INJECTION, SOLUTION INTRAVENOUS; SUBCUTANEOUS at 12:43

## 2025-03-03 RX ADMIN — ACETAMINOPHEN 975 MG: 325 TABLET ORAL at 14:34

## 2025-03-03 RX ADMIN — NEOMYCIN SULFATE, POLYMYXIN B SULFATE AND DEXAMETHASONE 2 DROP: 3.5; 10000; 1 SUSPENSION/ DROPS OPHTHALMIC at 09:31

## 2025-03-03 RX ADMIN — PANTOPRAZOLE SODIUM 40 MG: 40 TABLET, DELAYED RELEASE ORAL at 06:16

## 2025-03-03 RX ADMIN — METOCLOPRAMIDE 5 MG: 10 TABLET ORAL at 06:17

## 2025-03-03 RX ADMIN — RENO CAPS 1 CAPSULE: 100; 1.5; 1.7; 20; 10; 1; 150; 5; 6 CAPSULE ORAL at 09:24

## 2025-03-03 RX ADMIN — PREDNISONE 5 MG: 5 TABLET ORAL at 09:24

## 2025-03-03 RX ADMIN — LIDOCAINE 4% 2 PATCH: 40 PATCH TOPICAL at 09:23

## 2025-03-03 RX ADMIN — OXYCODONE 10 MG: 5 TABLET ORAL at 08:30

## 2025-03-03 RX ADMIN — ACETAMINOPHEN 975 MG: 325 TABLET ORAL at 21:25

## 2025-03-03 ASSESSMENT — PAIN SCALES - GENERAL
PAINLEVEL_OUTOF10: 0 - NO PAIN
PAINLEVEL_OUTOF10: 10 - WORST POSSIBLE PAIN

## 2025-03-03 ASSESSMENT — COGNITIVE AND FUNCTIONAL STATUS - GENERAL
DAILY ACTIVITIY SCORE: 24
DAILY ACTIVITIY SCORE: 24
MOBILITY SCORE: 24
MOBILITY SCORE: 24

## 2025-03-03 ASSESSMENT — PAIN DESCRIPTION - LOCATION: LOCATION: BACK

## 2025-03-03 ASSESSMENT — PAIN SCALES - PAIN ASSESSMENT IN ADVANCED DEMENTIA (PAINAD): TOTALSCORE: MEDICATION (SEE MAR)

## 2025-03-03 NOTE — PROGRESS NOTES
Physical Therapy                 Therapy Communication Note    Patient Name: Manolo Bashir  MRN: [id number]  Department: [place] [address]  Room: [address] [place] [address]  Today's Date: 3/3/2025     Discipline: Physical Therapy    PT Missed Visit: Yes     Missed Visit Reason: Missed Visit Reason: Patient refused, Other (Comment) (pt politely declined due to back pain, RN informed. Pt reports chronic back pain.)    Missed Time: Attempt    Comment: 15:10pm

## 2025-03-03 NOTE — PROGRESS NOTES
Manolo Bashir is a 69 y.o. male on day 4 of admission presenting with Acute chest pain.      Subjective   Pt feels itch is improved. Pt reports headache/neck/back pain. Glucose dropped to 59 and an episode of 50. Pt has hypoglycemic protocol in place.     Objective     Last Recorded Vitals  /72   Pulse 68   Temp 36.5 °C (97.7 °F)   Resp 16   Wt 68 kg (150 lb)   SpO2 100%   Intake/Output last 3 Shifts:    Intake/Output Summary (Last 24 hours) at 3/3/2025 1411  Last data filed at 3/2/2025 1417  Gross per 24 hour   Intake 200 ml   Output --   Net 200 ml       Admission Weight  Weight: 68 kg (150 lb) (02/27/25 0721)    Daily Weight  02/27/25 : 68 kg (150 lb)    Image Results  ECG 12 lead  Normal sinus rhythm  Right axis deviation  Nonspecific intraventricular block  Abnormal ECG  When compared with ECG of 31-JAN-2025 12:19,  QRS axis Shifted right    See ED provider note for full interpretation and clinical correlation  Confirmed by Millicent Sheikh ([id number]) on 2/27/2025 10:50:01 PM  ECG 12 Lead  Normal sinus rhythm  Right axis deviation  Nonspecific intraventricular block  Abnormal ECG  When compared with ECG of 27-FEB-2025 06:58,  No significant change was found    See ED provider note for full interpretation and clinical correlation  Confirmed by Millicent Sheikh ([id number]) on 2/27/2025 10:49:54 PM  ECG 12 lead  Normal sinus rhythm  Left axis deviation  Left bundle branch block  Abnormal ECG  When compared with ECG of 27-FEB-2025 06:59,  QRS axis Shifted left  Nonspecific T wave abnormality now evident in Lateral leads    See ED provider note for full interpretation and clinical correlation  Confirmed by Millicent Sheikh ([id number]) on 2/27/2025 10:49:41 PM  XR chest 2 views  Narrative: STUDY:  Chest Radiographs;  2/27/2025 8:24AM  INDICATION:  Chest pain.  COMPARISON:  4/3/2024 XR Chest.  ACCESSION NUMBER(S):  [id number]  ORDERING CLINICIAN:  NO ELIZABETH  TECHNIQUE:  Frontal and lateral chest.    FINDINGS:  CARDIOMEDIASTINAL SILHOUETTE:  Cardiomediastinal silhouette is normal in size and configuration.     LUNGS:  Lungs are clear.     ABDOMEN:  No remarkable upper abdominal findings.     BONES:  No acute osseous changes.  Impression: No acute cardiopulmonary disease.  Signed by Jacques Valdez MD      Physical Exam  Constitutional:       General: He is in acute distress.      Comments: Chronically-ill appearing    HENT:      Head: Normocephalic and atraumatic.      Nose: No rhinorrhea.   Eyes:      General:         Right eye: No discharge.         Left eye: No discharge.      Extraocular Movements: Extraocular movements intact.      Comments: Pupils equal and round    Cardiovascular:      Rate and Rhythm: Normal rate and regular rhythm.      Heart sounds: No murmur heard.     No friction rub. No gallop.   Pulmonary:      Effort: Pulmonary effort is normal. No respiratory distress.      Breath sounds: No wheezing or rhonchi.   Chest:      Chest wall: No tenderness.   Abdominal:      General: Abdomen is flat. Bowel sounds are normal. There is no distension.      Palpations: Abdomen is soft.      Tenderness: There is abdominal tenderness. There is no rebound.   Musculoskeletal:         General: No tenderness.      Right lower leg: No edema.      Left lower leg: No edema.   Skin:     General: Skin is warm and dry.      Coloration: Skin is not jaundiced.   Neurological:      Mental Status: He is alert and oriented to person, place, and time. Mental status is at baseline.   Psychiatric:         Mood and Affect: Mood normal.         Behavior: Behavior normal.         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  Patient's chest pain unlikely ACS as EKG and trops negative but responded to nitroglycerin and aspirin. BNP elevated and chest tender to palpation on exam. CT and exam showing fluid up, so pt went to dialysis 2/28 fluid removal, rsv/covid/flu neg, pt with cough +/- productive, after 3 sessions of HD along with starting antibiotics pt has has improvement in resp status. procal: 0.24 rsv/flu/covid  neg crp 1.88 elevated. In preparation for nephrectomy iso immunocompromise and pt with recurrent resp infections we are testing for atypicals and consulted pulm for a potential bronch however because pt resp status has improved with removal of fluid pulm holding on intervention, will plan to get ct chest 3/4 after hd to re-assess needs. Overall pt pain is improved but uncontrolled and resp status is improved.      Dispo: pending pt/ot eval and medically ready.         Updates:  -cw HD Tu Th Sa using left arm fistula  -plan for ct chest 3/4 after hd to assess fluid and resp status for consideration of thoracocentesis or bronch by pulm, otherwise plan for outpt repeat ct outpt 4-6 weeks as pt greatly improving by getting fluid off  -transplant nephrology updated on pulm recs, and nephrectomy can be rescheduled if pt is not medically optimized during this stay  -iso 2 hypoglycemic episodes (50s) lantus decreased to 10 from 14 and ssi #2 decreased to #1    -pending ID recs, cw vanc 3/2 +/- cw cefepime, MRSA nares 3/2 negative  -per transplant nephrology recs, there is ongoing c/f infection, cw broadened antibiotics instead of home doxy/levofloxacin. Goal is to optimize patient for potential inpatient nephrectomy    -pending histoplasma ag, fungitell b d glucan, spep (stable elevated M proteins and low but improved albumin), aspergillus galactomannan, blastomyces antibody, afb and tspot, resp viral panel, EBVneg/CMV neg/BK neg DNA. Legionella/strep pneumo urine not yet collected as  patient is oliguric to anuric  -blood cx 2/27 ngtd prelim   -per transplant Consider Neupogen for ANC <0.5.   -for chronic pain tylenol scheduled, lidocaine patches, warm compress, and oxy 5, holding oxy 10 and dc dilaudid iso pt lethargic and unable to work with pt/ot 3/3            #Chest Pain, resolved  #HFpEF, diastolic dysfunction  #Cough,2/2 Edema vs Resp Infection, improving  #SOB, resolved  ::Pt with ESRD high risk for CAD,  sharp CP in sternal region, brought on by exertion and relieved w/nitroglycerin patient however no characteristic ACS chest pain, Trops and EKG however negative, no concerns for Pericarditis, trops neg, bnp elevated, afebrile, leukopenia, immunocompromise, recent steroid burst with taper, still on tacrolimus currently.  Has been on levofloxacin and doxycycline past few days, prescribed outpatient by transplant nephrology.  ::Heart score of 4  ::ED reported tenderness on palpation of chest at presentation with concern for possible MSK pain  ::pT currently denies any CP  -rsv/flu/covid  neg, crp 1.88 elevated, procal: 0.24   Plan:  -sublingual nitroglycerin prn for pain  -tessalon pearls started for cough +/- productive   -cw ns spray    -cw HD Tu Th Sa using left arm fistula  -plan for ct chest 3/4 after hd to assess fluid and resp status for consideration of thoracocentesis or bronch by pulm, otherwise plan for outpt repeat ct outpt 4-6 weeks as pt greatly improving by getting fluid off  -transplant nephrology updated on pulm recs, and nephrectomy can be rescheduled if pt is not medically optimized during this stay  -pending ID recs, cw vanc 3/2 +/- cw cefepime, MRSA nares 3/2 negative  -per transplant nephrology recs, there is ongoing c/f infection, cw broadened antibiotics instead of home doxy/levofloxacin. Goal is to optimize patient for potential inpatient nephrectomy    -pending histoplasma ag, fungitell b d glucan, spep (stable elevated M proteins and low but improved albumin),  aspergillus galactomannan, blastomyces antibody, afb and tspot, resp viral panel, EBVneg/CMV neg/BK neg DNA. Legionella/strep pneumo urine not yet collected as patient is oliguric to anuric  -blood cx 2/27 ngtd prelim   -pain control as belwo         #Persistent abd pain iso Transplant kidney rejection  #ESRD on dialysis T Tu Sa  #Hx of two failed kidney transplants c/b chronic rejection  #Chronic immunosuppression  ::Pt was to fu with transplant nephro for planned nephrectomy of transplanted kidney, saw Dr Williamson 2/24 who started on Abx Doxycycline and Levofloxacin  Edema of the transplanted kidney, treated with steroids in the past couple months.  -S/p renal transplant x 2, currently ESRD on hemodialysis.  -basline oliguirc to anuric  PLAN  - Transplant nephrology following, prednisone 5 mg daily, tacrolimus 0.5 mg BID  - Tacrolimus level trending   - low threshold for CT AP if pt worsening and with worsening abd pain  -prev nausea and vomiting, home reglan qtc 463  -bp control as below   - patient is oliguric to anuric  - pt stopped calcitriol outpt per HD nephrologist   -cw HD Tu Th Sa using left arm fistula  -infectious workup as above   -transplant nephrology updated on pulm recs, and nephrectomy can be rescheduled if pt is not medically optimized during this stay  -per transplant nephrology recs, there is ongoing c/f infection, cw broadened antibiotics instead of home doxy/levofloxacin. Goal is to optimize patient for potential inpatient nephrectomy    -for chronic pain tylenol scheduled, lidocaine patches, warm compress, and oxy 5, holding oxy 10 and dc dilaudid iso pt lethargic and unable to work with pt/ot 3/3     #Chronic Neck/Back pain  #Headaches  Plan:  -cw HD, gluose and bp control  -for chronic pain tylenol scheduled, lidocaine patches, warm compress, and oxy 5, holding oxy 10 and dc dilaudid iso pt lethargic and unable to work with pt/ot 3/3      #Gastroparesis  #Nausea and vomiting, controlled  -   cw restarted home metoclopramide  - will monitor     #Leucopenia, improving  #IgG and IgA lambda MGUS, stable   #Thrombocytopenia, improving  - Thrombocytopenia chronic, Plt today 82<<149, may be decreased in the setting of infection vs. Immunosuppression vs. MGUS  PLAN  - CTM on daily CBC  - Continue home cinacalcet 30 mg daily  - pt stopped calcitriol outpt per HD nephrologist   -infectious management as above   -per transplant Consider Neupogen for ANC <0.5.        #Hx of recent cataract surgery  Plan:  - Cw home Maxitrol 1 drop L eye qAM      #Severe HTN, controlled   ::likely due to missed morning meds dose vs severe abd pain  ::BP-200s<<157   ::Pt reports his Bps are usually high when he goes for dialysis  ::home  carvedilol 37.5 mg twice daily, Imdur 60 mg as well as nifedipine XR 90 mg twice daily  Received dose of clonidine in the ED,  Plan:  -cw home meds      #T2DM  - Most recent A1c 9.1 12/16/24  ::Bld glucose-348  ::On lantus 18units in am, scheduled 3 units tid, ssi  -Beta-hydroxybutyrate levels-0.10  Plan:  -ac and at bedtime poct glucose   -carb controlled and renal diet   -iso 2 hypoglycemic episodes (50s) lantus decreased to 10 from 14 and ssi #2 decreased to #1         #HLD  - Continue home pravastatin 10 mg daily     #GERD  - Continue home pantoprazole 40 mg daily     #Pruritus, improving  Plan:  -started sarna lotion for back pruritus         F: Replete PRN  E: Replete PRN  N: renal and carb controlled   A: PIV / left arm avf for hd  DVT ppx: Unfractionated heparin     Code Status: Full Code (confirmed on admission)   NOK:  Primary Emergency Contact: KETTY PLASENCIA, Home Phone: [phone]        Constance Rapp MD  IM PGY-1

## 2025-03-03 NOTE — PROGRESS NOTES
Transitional Care Coordination Progress Note:  Patient discussed during interdisciplinary rounds.   Team members present: MD, ABIDA  Plan per Medical/Surgical team: Acute chest pain  Payor: Medicare  Discharge disposition: PT/OT pending  Potential Barriers: none  ADOD: 2-3 days  Attempted to meet with patient at bedside to complete admission assessment. Patient unavailable when this TCC arrived to room. Will attempt to meet with patient again as time allows.     Myah ESPINOSAN, RN  Transitional Care Coordinator (TCC)  [phone]

## 2025-03-03 NOTE — PROGRESS NOTES
"Manolo Bashir is a 69 y.o. male on day 4 of admission presenting with Acute chest pain.    Subjective   Feeling fine, no new issues    Objective     Physical Exam  GENERAL APPEARANCE: alert and cooperative, and appears to be in no acute distress.  HEAD: normocephalic  THROAT: Oral mucosa moist, no oral lesions seen.   CARDIAC: Regular rate and rhythm. + murmur appreciated.   LUNGS: Scattered crackles bilaterally. No wheezing. Normal work of breathing.  ABDOMEN: Positive bowel sounds. Soft, nondistended, tender to palpation in LLQ, LLQ renal allograft palpated.  NEUROLOGICAL: No obvious focal deficits.  Moving all 4 extremities, speech clear  SKIN: Skin warm and dry.  Midline superior aspect of anterior scrotum with macerated macular white plaques   PSYCHIATRIC: Flat affect.  LINES/TUBES/CATHETERS: LUE AV fistula + AV graft, thrill appreciated, bruit heard    Last Recorded Vitals  Blood pressure 152/72, pulse 68, temperature 36.5 °C (97.7 °F), resp. rate 16, height 1.727 m (5' 8\"), weight 68 kg (150 lb), SpO2 100%.    Intake/Output last 3 Shifts:  I/O last 3 completed shifts:  In: 440 (6.5 mL/kg) [P.O.:240; IV Piggyback:200]  Out: - (0 mL/kg)   Weight: 68 kg       Assessment/Plan     Manolo Bashir is a 69 year old with ESRD from hypertension s/p 2 kidney transplants (1998 and 2013) that failed in may 2024 now on HD (T/TH/S) through LUE AVG), MGUS, T2DM, HTN, prostate cancer s/p prostatectomy, urine incontinence who presented with CP 2/27. Pulm consulted for GGO and concern for opportunistic infection.     #Pulmonary edema   #Transudative pleural effusion     -Mr. Bashir clinical course, CT scan appearance and the change of his chest imaging in response to dialysis are most consistent with pulmonary edema with bilateral pleural effusion (right > left) from his renal and cardiac disease. Of note, he has recurrent serosanguinous pleural effusion in the right hemithorax. Both thoracentesis in 8/2025 and 9/2025 showed " transudative fluid, work  up was not complete in both times.    -Review of recent imaging showed an improvement I'm his right sided pleural effusion with dialysis and without thoracentesis. Which is not the usual case in infected or malignant effusions. On bed side exam today, minimal simple effusion was seen on the right side and none on the left side.      Recommendation:  -will defer thoracentesis for now, can consider it in the future if it re-accumulate and causing dyspnea.   - Would defer bronchoscopy with BAL for now, due to the low likelihood for infection.  - Repeat CT chest in 4-6 weeks with pulmonary follow up     -Pulmonary team will sign off     Manjit Ramesh MD

## 2025-03-03 NOTE — PROGRESS NOTES
Physical Therapy                 Therapy Communication Note    Patient Name: Manolo Bashir  MRN: [id number]  Department: [facility] [address]  Room: [address] [place] [address]  Today's Date: 3/3/2025     Discipline: Physical Therapy    PT Missed Visit: Yes     Missed Visit Reason: Missed Visit Reason: Other (Comment) (RN requested PT come back later d/t pt pain)    Missed Time: Attempt    Comment: 8:33am

## 2025-03-03 NOTE — CARE PLAN
Problem: Safety - Adult  Goal: Free from fall injury  Outcome: Progressing   The patient's goals for the shift include      The clinical goals for the shift include pt will have no complaints of nchest pain through end of shift

## 2025-03-03 NOTE — CARE PLAN
The patient's goals for the shift include      The clinical goals for the shift include Pt will be free from pain throughout shift.      Problem: Safety - Adult  Goal: Free from fall injury  Outcome: Progressing     Problem: Skin  Goal: Decreased wound size/increased tissue granulation at next dressing change  Outcome: Progressing  Goal: Participates in plan/prevention/treatment measures  Outcome: Progressing  Goal: Prevent/manage excess moisture  Outcome: Progressing  Goal: Prevent/minimize sheer/friction injuries  Outcome: Progressing  Flowsheets (Taken 3/3/2025 8395)  Prevent/minimize sheer/friction injuries:   Complete micro-shifts as needed if patient unable. Adjust patient position to relieve pressure points, not a full turn   HOB 30 degrees or less   Increase activity/out of bed for meals   Turn/reposition every 2 hours/use positioning/transfer devices  Goal: Promote/optimize nutrition  Outcome: Progressing  Goal: Promote skin healing  Outcome: Progressing     Problem: Pain  Goal: Takes deep breaths with improved pain control throughout the shift  Outcome: Progressing  Goal: Turns in bed with improved pain control throughout the shift  Outcome: Progressing  Goal: Walks with improved pain control throughout the shift  Outcome: Progressing  Goal: Performs ADL's with improved pain control throughout shift  Outcome: Progressing  Goal: Participates in PT with improved pain control throughout the shift  Outcome: Progressing  Goal: Free from opioid side effects throughout the shift  Outcome: Progressing  Goal: Free from acute confusion related to pain meds throughout the shift  Outcome: Progressing

## 2025-03-03 NOTE — PROGRESS NOTES
Occupational Therapy                 Therapy Communication Note    Patient Name: Manolo Bashir  MRN: [id number]  Department: [facility] [address]  Room: [address] [place] [address]  Today's Date: 3/3/2025     Discipline: Occupational Therapy    OT Missed Visit: Yes     Missed Visit Reason: Missed Visit Reason: Patient refused    Missed Time: Attempt 3:13pm pt refused due to back pain, RN aware. Will reattempt as schedule permits.

## 2025-03-03 NOTE — PROGRESS NOTES
Physical Therapy                 Therapy Communication Note    Patient Name: Manolo Bashir  MRN: [id number]  Department: [facility] [address]  Room: [address] [place] [address]  Today's Date: 3/3/2025     Discipline: Physical Therapy    PT Missed Visit: Yes     Missed Visit Reason: Missed Visit Reason: Other (Comment) (attempted to wake pt up for PT maliaal, he was too groggy to participate, his family reports that it was from the Oxycontin; RN informed.)    Missed Time: Attempt    Comment: 11:43am

## 2025-03-03 NOTE — PROGRESS NOTES
"Manolo Bashir is a 69 y.o. male on day 4 of admission presenting with Acute chest pain.    Subjective   Pt resting in bed with family at bedside.. Denies sob, n/v/d, fever, cough, chills, chest pains, light head, dizziness, constipation , pain to lower back       Objective     Physical Exam  Vitals and nursing note reviewed.   Cardiovascular:      Rate and Rhythm: Normal rate and regular rhythm.   Pulmonary:      Comments: Jeffrey lung sounds dim  %room air  Abdominal:      General: There is distension.      Tenderness: There is abdominal tenderness.      Comments: Tender to  palpation   Genitourinary:     Comments: anuric  Musculoskeletal:      Comments: Ble without edema  Pain to lower back-lidocaine patches in place   Skin:     General: Skin is warm and dry.   Neurological:      Mental Status: He is alert and oriented to person, place, and time.   Psychiatric:         Mood and Affect: Mood normal.         Behavior: Behavior normal.         Last Recorded Vitals  Blood pressure 152/72, pulse 68, temperature 36.5 °C (97.7 °F), resp. rate 16, height 1.727 m (5' 8\"), weight 68 kg (150 lb), SpO2 100%.  Intake/Output last 3 Shifts:  I/O last 3 completed shifts:  In: 440 (6.5 mL/kg) [P.O.:240; IV Piggyback:200]  Out: - (0 mL/kg)   Weight: 68 kg     Relevant Results  Scheduled medications  acetaminophen, 975 mg, oral, TID  carvedilol, 37.5 mg, oral, BID  cefepime, 1 g, intravenous, q24h  cinacalcet, 30 mg, oral, Daily  clotrimazole, , Topical, BID  epoetin aida or biosimilar, 10,000 Units, intravenous, Once per day on Tuesday Thursday Saturday  [Held by provider] heparin (porcine), 5,000 Units, subcutaneous, q8h [facility]  [START ON 3/4/2025] insulin glargine, 10 Units, subcutaneous, q AM  insulin lispro, 0-5 Units, subcutaneous, TID AC  isosorbide mononitrate ER, 60 mg, oral, Daily  lidocaine, 2 patch, transdermal, Daily  metoclopramide, 5 mg, oral, TID AC  neomycin-polymyxin-dexAMETHasone, 2 drop, Left Eye, q " AM  NIFEdipine ER, 90 mg, oral, BID  oxyCODONE, 5 mg, oral, Once  pantoprazole, 40 mg, oral, Daily before breakfast  polyethylene glycol, 17 g, oral, Daily  pramoxine, , Topical, TID  pravastatin, 10 mg, oral, Nightly  predniSONE, 5 mg, oral, Daily  sevelamer carbonate, 800 mg, oral, TID AC  tacrolimus, 0.5 mg, oral, q12h ZOË  vitamin B complex-vitamin C-folic acid, 1 capsule, oral, Daily      Continuous medications     PRN medications  PRN medications: benzonatate, dextrose, dextrose, glucagon, glucagon, nitroglycerin, ondansetron, [Held by provider] oxyCODONE, oxyCODONE, sodium chloride, vancomycin   Results for orders placed or performed during the hospital encounter of 02/27/25 (from the past 24 hours)   POCT GLUCOSE   Result Value Ref Range    POCT Glucose 105 (H) 74 - 99 mg/dL   POCT GLUCOSE   Result Value Ref Range    POCT Glucose 59 (L) 74 - 99 mg/dL   POCT GLUCOSE   Result Value Ref Range    POCT Glucose 100 (H) 74 - 99 mg/dL   Renal function panel   Result Value Ref Range    Glucose 40 (LL) 74 - 99 mg/dL    Sodium 136 136 - 145 mmol/L    Potassium 4.1 3.5 - 5.3 mmol/L    Chloride 96 (L) 98 - 107 mmol/L    Bicarbonate 26 21 - 32 mmol/L    Anion Gap 18 10 - 20 mmol/L    Urea Nitrogen 20 6 - 23 mg/dL    Creatinine 8.50 (H) 0.50 - 1.30 mg/dL    eGFR 6 (L) >60 mL/min/1.73m*2    Calcium 8.4 (L) 8.6 - 10.6 mg/dL    Phosphorus 2.7 2.5 - 4.9 mg/dL    Albumin 3.2 (L) 3.4 - 5.0 g/dL   Tacrolimus level   Result Value Ref Range    Tacrolimus  <2.0 <=15.0 ng/mL   Magnesium   Result Value Ref Range    Magnesium 2.01 1.60 - 2.40 mg/dL   CBC and Auto Differential   Result Value Ref Range    WBC 4.7 4.4 - 11.3 x10*3/uL    nRBC 0.0 0.0 - 0.0 /100 WBCs    RBC 3.84 (L) 4.50 - 5.90 x10*6/uL    Hemoglobin 10.7 (L) 13.5 - 17.5 g/dL    Hematocrit 33.5 (L) 41.0 - 52.0 %    MCV 87 80 - 100 fL    MCH 27.9 26.0 - 34.0 pg    MCHC 31.9 (L) 32.0 - 36.0 g/dL    RDW 15.0 (H) 11.5 - 14.5 %    Platelets 90 (L) 150 - 450 x10*3/uL     Neutrophils % 62.0 40.0 - 80.0 %    Immature Granulocytes %, Automated 0.4 0.0 - 0.9 %    Lymphocytes % 21.8 13.0 - 44.0 %    Monocytes % 11.0 2.0 - 10.0 %    Eosinophils % 4.4 0.0 - 6.0 %    Basophils % 0.4 0.0 - 2.0 %    Neutrophils Absolute 2.92 1.20 - 7.70 x10*3/uL    Immature Granulocytes Absolute, Automated 0.02 0.00 - 0.70 x10*3/uL    Lymphocytes Absolute 1.03 (L) 1.20 - 4.80 x10*3/uL    Monocytes Absolute 0.52 0.10 - 1.00 x10*3/uL    Eosinophils Absolute 0.21 0.00 - 0.70 x10*3/uL    Basophils Absolute 0.02 0.00 - 0.10 x10*3/uL   POCT GLUCOSE   Result Value Ref Range    POCT Glucose 52 (L) 74 - 99 mg/dL   POCT GLUCOSE   Result Value Ref Range    POCT Glucose 115 (H) 74 - 99 mg/dL   POCT GLUCOSE   Result Value Ref Range    POCT Glucose 180 (H) 74 - 99 mg/dL     *Note: Due to a large number of results and/or encounters for the requested time period, some results have not been displayed. A complete set of results can be found in Results Review.                             Assessment/Plan   Assessment & Plan  Acute chest pain    Tolerated hemodialysis Saturday with net fluid loss 2L    Is hemodynamically stable, euvolemic on exam and has stable electrolytes . K+=4..1     Outpatient Dialysis schedule:  [facility]       Access: Lt graft +thrill/bruit- no issues - able to achieve      Anemia of ESRD:   epoetin aida-epbx (Retacrit) injection 10,000 Units  ... Current hgb 10.7.. will cont to monitor     CKD-MBD Phosphate Binder:cinacalcet (Sensipar) tablet 30 mg, daily, sevelamer carbonate (Renvela) tablet 800 mg  tid ac.. current phos level 2.7.. will cont to monitor, vitamin B complex-vitamin C-folic acid (Nephrocaps) capsule 1 capsule daily     Plan HD tomorrow with UF as tolerated     Renal diet      Please obtain daily standing wt (if possible)     Medication to be adjusted for ESRD      Patient to continue regular HD schedule while inpatient and to follow with the outpatient nephrologist at discharge           I spent 50 minutes in the professional and overall care of this patient.      Juhi Vázquez, APRN-CNP

## 2025-03-03 NOTE — PROGRESS NOTES
Occupational Therapy                 Therapy Communication Note    Patient Name: Manolo Bashir  MRN: [id number]  Department: [place] [address]  Room: [address] [place] [address]  Today's Date: 3/3/2025     Discipline: Occupational Therapy    OT Missed Visit: Yes     Missed Visit Reason: Missed Visit Reason: Patient placed on medical hold (per RN, pt glucose level is too low, hold therapy at this time. Will reattempt when medically approrpiate.)    Missed Time: Attempt 8:40am

## 2025-03-04 ENCOUNTER — APPOINTMENT (OUTPATIENT)
Dept: DIALYSIS | Facility: HOSPITAL | Age: 70
End: 2025-03-04
Payer: MEDICARE

## 2025-03-04 ENCOUNTER — APPOINTMENT (OUTPATIENT)
Dept: RADIOLOGY | Facility: HOSPITAL | Age: 70
End: 2025-03-04
Payer: MEDICARE

## 2025-03-04 LAB
ADENOVIRUS RVP, VIRC: NOT DETECTED
ALBUMIN SERPL BCP-MCNC: 3.1 G/DL (ref 3.4–5)
ALBUMIN: 3.3 G/DL (ref 3.4–5)
ALPHA 1 GLOBULIN: 0.4 G/DL (ref 0.2–0.6)
ALPHA 2 GLOBULIN: 0.5 G/DL (ref 0.4–1.1)
ANION GAP SERPL CALC-SCNC: 14 MMOL/L (ref 10–20)
ASPERGILLUS GALACTOMANNAN EIA,SERUM: 0.04
BASOPHILS # BLD AUTO: 0.01 X10*3/UL (ref 0–0.1)
BASOPHILS NFR BLD AUTO: 0.3 %
BETA GLOBULIN: 1.2 G/DL (ref 0.5–1.2)
BUN SERPL-MCNC: 28 MG/DL (ref 6–23)
CALCIUM SERPL-MCNC: 8.6 MG/DL (ref 8.6–10.6)
CHLORIDE SERPL-SCNC: 95 MMOL/L (ref 98–107)
CO2 SERPL-SCNC: 29 MMOL/L (ref 21–32)
CREAT SERPL-MCNC: 10.36 MG/DL (ref 0.5–1.3)
EGFRCR SERPLBLD CKD-EPI 2021: 5 ML/MIN/1.73M*2
ENTEROVIRUS/RHINOVIRUS RVP, VIRC: NOT DETECTED
EOSINOPHIL # BLD AUTO: 0.1 X10*3/UL (ref 0–0.7)
EOSINOPHIL NFR BLD AUTO: 3 %
ERYTHROCYTE [DISTWIDTH] IN BLOOD BY AUTOMATED COUNT: 14.8 % (ref 11.5–14.5)
FUNGITELL BETA-D GLUCAN,SERUM: 61 PG/ML
GAMMA GLOBULIN: 1.1 G/DL (ref 0.5–1.4)
GLUCOSE BLD MANUAL STRIP-MCNC: 143 MG/DL (ref 74–99)
GLUCOSE BLD MANUAL STRIP-MCNC: 163 MG/DL (ref 74–99)
GLUCOSE BLD MANUAL STRIP-MCNC: 211 MG/DL (ref 74–99)
GLUCOSE SERPL-MCNC: 112 MG/DL (ref 74–99)
HCT VFR BLD AUTO: 29.2 % (ref 41–52)
HGB BLD-MCNC: 9.5 G/DL (ref 13.5–17.5)
HUMAN BOCAVIRUS RVP, VIRC: NOT DETECTED
HUMAN CORONAVIRUS RVP, VIRC: NOT DETECTED
IMM GRANULOCYTES # BLD AUTO: 0.02 X10*3/UL (ref 0–0.7)
IMM GRANULOCYTES NFR BLD AUTO: 0.6 % (ref 0–0.9)
IMMUNOFIXATION COMMENT: ABNORMAL
INFLUENZA A , VIRC: NOT DETECTED
INFLUENZA A H1N1-09 , VIRC: NOT DETECTED
INFLUENZA B PCR, VIRC: NOT DETECTED
LYMPHOCYTES # BLD AUTO: 0.56 X10*3/UL (ref 1.2–4.8)
LYMPHOCYTES NFR BLD AUTO: 17 %
M-PROTEIN 1: 0.3 G/DL
M-PROTEIN 2: 0.2 G/DL
M-PROTEIN 3: 0.2 G/DL
MAGNESIUM SERPL-MCNC: 1.93 MG/DL (ref 1.6–2.4)
MCH RBC QN AUTO: 27.8 PG (ref 26–34)
MCHC RBC AUTO-ENTMCNC: 32.5 G/DL (ref 32–36)
MCV RBC AUTO: 85 FL (ref 80–100)
METAPNEUMOVIRUS , VIRC: NOT DETECTED
MONOCYTES # BLD AUTO: 0.46 X10*3/UL (ref 0.1–1)
MONOCYTES NFR BLD AUTO: 13.9 %
NEUTROPHILS # BLD AUTO: 2.15 X10*3/UL (ref 1.2–7.7)
NEUTROPHILS NFR BLD AUTO: 65.2 %
NRBC BLD-RTO: 0 /100 WBCS (ref 0–0)
PARAINFLUENZA PCR, VIRC: NOT DETECTED
PATH REVIEW - SERUM IMMUNOFIXATION: ABNORMAL
PATH REVIEW-SERUM PROTEIN ELECTROPHORESIS: ABNORMAL
PHOSPHATE SERPL-MCNC: 3.4 MG/DL (ref 2.5–4.9)
PLATELET # BLD AUTO: 68 X10*3/UL (ref 150–450)
POTASSIUM SERPL-SCNC: 4.7 MMOL/L (ref 3.5–5.3)
PROTEIN ELECTROPHORESIS COMMENT: ABNORMAL
RBC # BLD AUTO: 3.42 X10*6/UL (ref 4.5–5.9)
RSV PCR, RVP, VIRC: NOT DETECTED
SODIUM SERPL-SCNC: 133 MMOL/L (ref 136–145)
TACROLIMUS BLD-MCNC: 2 NG/ML
WBC # BLD AUTO: 3.3 X10*3/UL (ref 4.4–11.3)

## 2025-03-04 PROCEDURE — 2500000001 HC RX 250 WO HCPCS SELF ADMINISTERED DRUGS (ALT 637 FOR MEDICARE OP)

## 2025-03-04 PROCEDURE — 90935 HEMODIALYSIS ONE EVALUATION: CPT | Performed by: INTERNAL MEDICINE

## 2025-03-04 PROCEDURE — 2500000001 HC RX 250 WO HCPCS SELF ADMINISTERED DRUGS (ALT 637 FOR MEDICARE OP): Performed by: NUTRITIONIST

## 2025-03-04 PROCEDURE — 71250 CT THORAX DX C-: CPT | Performed by: RADIOLOGY

## 2025-03-04 PROCEDURE — 80197 ASSAY OF TACROLIMUS: CPT

## 2025-03-04 PROCEDURE — 8010000001 HC DIALYSIS - HEMODIALYSIS PER DAY

## 2025-03-04 PROCEDURE — 2500000004 HC RX 250 GENERAL PHARMACY W/ HCPCS (ALT 636 FOR OP/ED)

## 2025-03-04 PROCEDURE — 2500000002 HC RX 250 W HCPCS SELF ADMINISTERED DRUGS (ALT 637 FOR MEDICARE OP, ALT 636 FOR OP/ED)

## 2025-03-04 PROCEDURE — 83735 ASSAY OF MAGNESIUM: CPT | Performed by: NUTRITIONIST

## 2025-03-04 PROCEDURE — 99233 SBSQ HOSP IP/OBS HIGH 50: CPT | Performed by: NUTRITIONIST

## 2025-03-04 PROCEDURE — 2500000005 HC RX 250 GENERAL PHARMACY W/O HCPCS: Performed by: NUTRITIONIST

## 2025-03-04 PROCEDURE — 80069 RENAL FUNCTION PANEL: CPT | Performed by: NUTRITIONIST

## 2025-03-04 PROCEDURE — 71250 CT THORAX DX C-: CPT

## 2025-03-04 PROCEDURE — 85025 COMPLETE CBC W/AUTO DIFF WBC: CPT | Performed by: NUTRITIONIST

## 2025-03-04 PROCEDURE — 36415 COLL VENOUS BLD VENIPUNCTURE: CPT

## 2025-03-04 PROCEDURE — 6350000001 HC RX 635 EPOETIN >10,000 UNITS: Mod: JZ,TB | Performed by: INTERNAL MEDICINE

## 2025-03-04 PROCEDURE — 1100000001 HC PRIVATE ROOM DAILY

## 2025-03-04 PROCEDURE — 82947 ASSAY GLUCOSE BLOOD QUANT: CPT

## 2025-03-04 PROCEDURE — 99232 SBSQ HOSP IP/OBS MODERATE 35: CPT | Performed by: INTERNAL MEDICINE

## 2025-03-04 PROCEDURE — 2500000004 HC RX 250 GENERAL PHARMACY W/ HCPCS (ALT 636 FOR OP/ED): Performed by: NUTRITIONIST

## 2025-03-04 RX ORDER — TIZANIDINE 2 MG/1
1 TABLET ORAL NIGHTLY PRN
Status: DISCONTINUED | OUTPATIENT
Start: 2025-03-04 | End: 2025-03-06 | Stop reason: HOSPADM

## 2025-03-04 RX ORDER — OXYCODONE HYDROCHLORIDE 5 MG/1
2.5 TABLET ORAL EVERY 6 HOURS PRN
Status: DISCONTINUED | OUTPATIENT
Start: 2025-03-04 | End: 2025-03-06 | Stop reason: HOSPADM

## 2025-03-04 RX ORDER — OXYCODONE HYDROCHLORIDE 5 MG/1
5 TABLET ORAL EVERY 6 HOURS PRN
Status: DISCONTINUED | OUTPATIENT
Start: 2025-03-04 | End: 2025-03-06 | Stop reason: HOSPADM

## 2025-03-04 RX ORDER — DIPHENHYDRAMINE HCL 25 MG
25 CAPSULE ORAL ONCE
Status: COMPLETED | OUTPATIENT
Start: 2025-03-04 | End: 2025-03-04

## 2025-03-04 RX ORDER — OXYCODONE HYDROCHLORIDE 5 MG/1
2.5 TABLET ORAL EVERY 6 HOURS PRN
Status: DISCONTINUED | OUTPATIENT
Start: 2025-03-04 | End: 2025-03-04

## 2025-03-04 RX ADMIN — DIPHENHYDRAMINE HYDROCHLORIDE 25 MG: 25 CAPSULE ORAL at 12:13

## 2025-03-04 RX ADMIN — PANTOPRAZOLE SODIUM 40 MG: 40 TABLET, DELAYED RELEASE ORAL at 06:21

## 2025-03-04 RX ADMIN — PRAMOXINE HYDROCHLORIDE: 10 LOTION TOPICAL at 11:55

## 2025-03-04 RX ADMIN — CARVEDILOL 37.5 MG: 12.5 TABLET, FILM COATED ORAL at 11:51

## 2025-03-04 RX ADMIN — CINACALCET HYDROCHLORIDE 30 MG: 30 TABLET, FILM COATED ORAL at 11:57

## 2025-03-04 RX ADMIN — NIFEDIPINE 90 MG: 90 TABLET, FILM COATED, EXTENDED RELEASE ORAL at 20:26

## 2025-03-04 RX ADMIN — TACROLIMUS 0.5 MG: 0.5 CAPSULE ORAL at 06:20

## 2025-03-04 RX ADMIN — SEVELAMER CARBONATE 800 MG: 800 TABLET, FILM COATED ORAL at 11:54

## 2025-03-04 RX ADMIN — NEOMYCIN SULFATE, POLYMYXIN B SULFATE AND DEXAMETHASONE 2 DROP: 3.5; 10000; 1 SUSPENSION/ DROPS OPHTHALMIC at 11:50

## 2025-03-04 RX ADMIN — ACETAMINOPHEN 975 MG: 325 TABLET ORAL at 15:39

## 2025-03-04 RX ADMIN — PRAVASTATIN SODIUM 10 MG: 20 TABLET ORAL at 20:25

## 2025-03-04 RX ADMIN — CEFEPIME 1 G: 1 INJECTION, SOLUTION INTRAVENOUS at 20:26

## 2025-03-04 RX ADMIN — RENO CAPS 1 CAPSULE: 100; 1.5; 1.7; 20; 10; 1; 150; 5; 6 CAPSULE ORAL at 11:51

## 2025-03-04 RX ADMIN — SEVELAMER CARBONATE 800 MG: 800 TABLET, FILM COATED ORAL at 15:39

## 2025-03-04 RX ADMIN — PRAMOXINE HYDROCHLORIDE: 10 LOTION TOPICAL at 20:27

## 2025-03-04 RX ADMIN — METOCLOPRAMIDE 5 MG: 10 TABLET ORAL at 15:39

## 2025-03-04 RX ADMIN — CLOTRIMAZOLE: 10 CREAM TOPICAL at 20:30

## 2025-03-04 RX ADMIN — PREDNISONE 5 MG: 5 TABLET ORAL at 11:50

## 2025-03-04 RX ADMIN — METOCLOPRAMIDE 5 MG: 10 TABLET ORAL at 06:20

## 2025-03-04 RX ADMIN — CARVEDILOL 37.5 MG: 12.5 TABLET, FILM COATED ORAL at 20:24

## 2025-03-04 RX ADMIN — ACETAMINOPHEN 975 MG: 325 TABLET ORAL at 20:25

## 2025-03-04 RX ADMIN — NIFEDIPINE 90 MG: 90 TABLET, FILM COATED, EXTENDED RELEASE ORAL at 11:56

## 2025-03-04 RX ADMIN — ISOSORBIDE MONONITRATE 60 MG: 30 TABLET, EXTENDED RELEASE ORAL at 11:50

## 2025-03-04 RX ADMIN — EPOETIN ALFA-EPBX 10000 UNITS: 10000 INJECTION, SOLUTION INTRAVENOUS; SUBCUTANEOUS at 22:57

## 2025-03-04 RX ADMIN — ACETAMINOPHEN 975 MG: 325 TABLET ORAL at 11:50

## 2025-03-04 RX ADMIN — TACROLIMUS 0.5 MG: 0.5 CAPSULE ORAL at 17:49

## 2025-03-04 RX ADMIN — INSULIN LISPRO 1 UNITS: 100 INJECTION, SOLUTION INTRAVENOUS; SUBCUTANEOUS at 16:56

## 2025-03-04 RX ADMIN — INSULIN GLARGINE 10 UNITS: 100 INJECTION, SOLUTION SUBCUTANEOUS at 11:58

## 2025-03-04 RX ADMIN — METOCLOPRAMIDE 5 MG: 10 TABLET ORAL at 11:51

## 2025-03-04 ASSESSMENT — COGNITIVE AND FUNCTIONAL STATUS - GENERAL
MOBILITY SCORE: 24
MOBILITY SCORE: 24
DAILY ACTIVITIY SCORE: 24
DAILY ACTIVITIY SCORE: 24

## 2025-03-04 ASSESSMENT — PAIN SCALES - GENERAL
PAINLEVEL_OUTOF10: 0 - NO PAIN
PAINLEVEL_OUTOF10: 0 - NO PAIN

## 2025-03-04 ASSESSMENT — PAIN - FUNCTIONAL ASSESSMENT: PAIN_FUNCTIONAL_ASSESSMENT: NO/DENIES PAIN

## 2025-03-04 ASSESSMENT — ACTIVITIES OF DAILY LIVING (ADL): LACK_OF_TRANSPORTATION: NO

## 2025-03-04 NOTE — PROGRESS NOTES
Manolo Bashir is a 69 y.o. male on day 5 of admission presenting with Acute chest pain.      Subjective   Pt feeling itchy again.       Objective     Last Recorded Vitals  /65   Pulse 80   Temp 35.6 °C (96.1 °F) (Temporal)   Resp 16   Wt 68 kg (150 lb)   SpO2 98%   Intake/Output last 3 Shifts:    Intake/Output Summary (Last 24 hours) at 3/4/2025 1407  Last data filed at 3/4/2025 1053  Gross per 24 hour   Intake 1000 ml   Output 3400 ml   Net -2400 ml       Admission Weight  Weight: 68 kg (150 lb) (02/27/25 0721)    Daily Weight  02/27/25 : 68 kg (150 lb)    Image Results  ECG 12 lead  Normal sinus rhythm  Right axis deviation  Nonspecific intraventricular block  Abnormal ECG  When compared with ECG of 31-JAN-2025 12:19,  QRS axis Shifted right    See ED provider note for full interpretation and clinical correlation  Confirmed by Millicent Sheikh ([id number]) on 2/27/2025 10:50:01 PM  ECG 12 Lead  Normal sinus rhythm  Right axis deviation  Nonspecific intraventricular block  Abnormal ECG  When compared with ECG of 27-FEB-2025 06:58,  No significant change was found    See ED provider note for full interpretation and clinical correlation  Confirmed by Millicent Sheikh [id number]) on 2/27/2025 10:49:54 PM  ECG 12 lead  Normal sinus rhythm  Left axis deviation  Left bundle branch block  Abnormal ECG  When compared with ECG of 27-FEB-2025 06:59,  QRS axis Shifted left  Nonspecific T wave abnormality now evident in Lateral leads    See ED provider note for full interpretation and clinical correlation  Confirmed by Millicent Sheikh ([id number]) on 2/27/2025 10:49:41 PM  XR chest 2 views  Narrative: STUDY:  Chest Radiographs;  2/27/2025 8:24AM  INDICATION:  Chest pain.  COMPARISON:  4/3/2024 XR Chest.  ACCESSION NUMBER(S):  [id number]  ORDERING CLINICIAN:  NO ELIZABETH  TECHNIQUE:  Frontal and lateral chest.   FINDINGS:  CARDIOMEDIASTINAL SILHOUETTE:  Cardiomediastinal silhouette is normal in size and configuration.      LUNGS:  Lungs are clear.     ABDOMEN:  No remarkable upper abdominal findings.     BONES:  No acute osseous changes.  Impression: No acute cardiopulmonary disease.  Signed by Jacques Valdez MD      Physical Exam  Constitutional:       Comments: Chronically-ill appearing   HENT:      Head: Normocephalic and atraumatic.      Nose: No rhinorrhea.      Mouth/Throat:      Mouth: Mucous membranes are moist.   Eyes:      General:         Right eye: No discharge.         Left eye: No discharge.      Extraocular Movements: Extraocular movements intact.   Cardiovascular:      Rate and Rhythm: Normal rate.      Heart sounds: Murmur heard.   Pulmonary:      Effort: No respiratory distress.      Breath sounds: No wheezing or rhonchi.   Chest:      Chest wall: No tenderness.   Abdominal:      General: Bowel sounds are normal. There is no distension.      Tenderness: There is abdominal tenderness. There is guarding. There is no rebound.      Comments: Left abd tender   Musculoskeletal:         General: No deformity or signs of injury.      Right lower leg: No edema.      Left lower leg: No edema.   Skin:     General: Skin is warm and dry.      Findings: No rash.   Neurological:      General: No focal deficit present.      Mental Status: He is alert and oriented to person, place, and time. Mental status is at baseline.   Psychiatric:         Mood and Affect: Mood normal.         Behavior: Behavior normal.           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  Patient's chest pain unlikely ACS as EKG and trops negative but responded to nitroglycerin and aspirin. BNP elevated and chest  tender to palpation on exam. CT and exam showing fluid up, so pt went to dialysis 2/28 fluid removal, rsv/covid/flu neg, pt with cough +/- productive, after 3 sessions of HD along with starting antibiotics pt has has improvement in resp status. procal: 0.24 rsv/flu/covid  neg crp 1.88 elevated. In preparation for nephrectomy iso immunocompromise and pt with recurrent resp infections we are testing for atypicals and consulted pulm for a potential bronch however because pt resp status has improved with removal of fluid pulm holding on intervention, will plan to get ct chest 3/4 after hd to re-assess needs. Overall pt pain is improved but uncontrolled and resp status is improved.      Dispo: pending pt/ot eval likely SNF though pt is declining SNF. Pending nephrectomy and antibiotic plan for discharge.         Updates:  -fu CT Chest stat 3/4 wo contrast to assess fluid and resp status for consideration of thoracocentesis or bronch by pulm, pending official read but looks far improved from admission so likely, plan for outpt repeat ct outpt 4-6 weeks as pt greatly improving by getting fluid off  -Pending ID recs for management of resp status and antibiotic reg for discharge  -for itch cw sarna lotion and gave benadryl once 3/4  -cw HD [facility] Th Sa using left arm fistula, 3/4 3L removed bp/hr wnl   -Started low dose tizanidine at night (1mg) can max at 2mg maybe bid for muscle spasms causing back pain  -dc vanc (3/2-3/4) iso MRSA nares negative 3/2, cw cefepime  -blood cx 2/27 ngtd final  -per transplant Consider Neupogen for ANC <0.5.   -for chronic pain tylenol scheduled, lidocaine patches, warm compress, and oxy 2.5 and oxy 5, 3/4 dc oxy 10  iso pt lethargic and unable to work with pt/ot 3/3   -transplant nephrology following recs, there is ongoing c/f infection, cw broadened antibiotics instead of home doxy/levofloxacin. Goal is to optimize patient for potential inpatient nephrectomy  though nephrectomy can be  rescheduled if pt is not medically optimized during this stay.   -pending histoplasma ag, fungitell b d glucan, aspergillus galactomannan, blastomyces antibody, afb and tspot, resp viral panel. Legionella/strep pneumo urine not yet collected as patient is oliguric to anuric             #Chest Pain, resolved  #HFpEF, diastolic dysfunction  #Cough,2/2 Edema vs Resp Infection, improving  #SOB, resolved  ::Pt with ESRD high risk for CAD,  sharp CP in sternal region, brought on by exertion and relieved w/nitroglycerin patient however no characteristic ACS chest pain, Trops and EKG however negative, no concerns for Pericarditis, trops neg, bnp elevated, afebrile, leukopenia, immunocompromise, recent steroid burst with taper, still on tacrolimus currently.  Has been on levofloxacin and doxycycline past few days, prescribed outpatient by transplant nephrology.  ::Heart score of 4  ::ED reported tenderness on palpation of chest at presentation with concern for possible MSK pain  ::pT currently denies any CP  -rsv/flu/covid  neg, crp 1.88 elevated, procal: 0.24   Plan:  -sublingual nitroglycerin prn for pain  -tessalon pearls started for cough +/- productive   -cw ns spray    -pain control as below   -fu CT Chest stat 3/4 wo contrast to assess fluid and resp status for consideration of thoracocentesis or bronch by pulm, pending official read but looks far improved from admission so likely, plan for outpt repeat ct outpt 4-6 weeks as pt greatly improving by getting fluid off  -Pending ID recs for management of resp status and antibiotic reg for discharge  -cw HD Tu Th Sa using left arm fistula, 3/4 3L removed bp/hr wnl   -dc vanc (3/2-3/4) iso MRSA nares negative 3/2, cw cefepime  -blood cx 2/27 ngtd final  -transplant nephrology following recs, there is ongoing c/f infection, cw broadened antibiotics instead of home doxy/levofloxacin. Goal is to optimize patient for potential inpatient nephrectomy  though nephrectomy can be  rescheduled if pt is not medically optimized during this stay.   -pending histoplasma ag, fungitell b d glucan, aspergillus galactomannan, blastomyces antibody, afb and tspot, resp viral panel. Legionella/strep pneumo urine not yet collected as patient is oliguric to anuric        #Persistent abd pain iso Transplant kidney rejection  #ESRD on dialysis T Tu Sa  #Hx of two failed kidney transplants c/b chronic rejection  #Chronic immunosuppression  ::Pt was to fu with transplant nephro for planned nephrectomy of transplanted kidney, saw Dr Williamson 2/24 who started on Abx Doxycycline and Levofloxacin  Edema of the transplanted kidney, treated with steroids in the past couple months.  -S/p renal transplant x 2, currently ESRD on hemodialysis.  -basline oliguirc to anuric  -EBVneg/CMV neg/BK neg DNA.  PLAN  - Transplant nephrology following, prednisone 5 mg daily, tacrolimus 0.5 mg BID  - Tacrolimus level trending   - low threshold for CT AP if pt worsening and with worsening abd pain  -prev nausea and vomiting, home reglan qtc 463  -bp control as below   - patient is oliguric to anuric  - pt stopped calcitriol outpt per HD nephrologist   -infectious workup as above   -pain management as below    -cw HD [facility] Th Sa using left arm fistula, 3/4 3L removed bp/hr wnl   -per transplant Consider Neupogen for ANC <0.5.    -transplant nephrology following recs, there is ongoing c/f infection, cw broadened antibiotics instead of home doxy/levofloxacin. Goal is to optimize patient for potential inpatient nephrectomy  though nephrectomy can be rescheduled if pt is not medically optimized during this stay.        #Chronic Neck/Back pain  #Headaches  Plan:  -cw HD, gluose and bp control  -Started low dose tizanidine at night (1mg) can max at 2mg maybe bid for muscle spasms causing back pain  -for chronic pain tylenol scheduled, lidocaine patches, warm compress, and oxy 2.5 and oxy 5, 3/4 dc oxy 10  iso pt lethargic and unable to work  with pt/ot 3/3       #Gastroparesis  #Nausea and vomiting, controlled  -  cw restarted home metoclopramide  - will monitor     #Leucopenia, unresolved  #IgG and IgA lambda MGUS, stable   #Thrombocytopenia, unresolved  - Thrombocytopenia chronic, Plt today 82<<149, may be decreased in the setting of infection vs. Immunosuppression vs. MGUS  -spep (stable elevated M proteins and low but improved albumin)  PLAN  - CTM on daily CBC  - Continue home cinacalcet 30 mg daily  - pt stopped calcitriol outpt per HD nephrologist   -infectious management as above   -per transplant Consider Neupogen for ANC <0.5.         #Hx of recent cataract surgery  Plan:  - Cw home Maxitrol 1 drop L eye qAM      #Severe HTN, controlled   ::likely due to missed morning meds dose vs severe abd pain  ::BP-200s<<157   ::Pt reports his Bps are usually high when he goes for dialysis  ::home  carvedilol 37.5 mg twice daily, Imdur 60 mg as well as nifedipine XR 90 mg twice daily  Received dose of clonidine in the ED,  Plan:  -cw home meds      #T2DM  - Most recent A1c 9.1 12/16/24  ::Bld glucose-348  ::On lantus 18units in am, scheduled 3 units tid, ssi  -Beta-hydroxybutyrate levels-0.10  Plan:  -ac and at bedtime poct glucose   -carb controlled and renal diet   -iso 2 hypoglycemic episodes (50s) 3/3 lantus decreased to 10 and ssi #2 decreased to #1          #HLD  - Continue home pravastatin 10 mg daily     #GERD  - Continue home pantoprazole 40 mg daily     #Pruritus, improving  Plan:  -for itch cw sarna lotion and gave benadryl once 3/4          F: Replete PRN  E: Replete PRN  N: renal and carb controlled   A: PIV / left arm avf for hd  DVT ppx: Unfractionated heparin     Code Status: Full Code (confirmed on admission)   NOK:  Primary Emergency Contact: KETTY PLASENCIA, Home Phone: [phone]          Constance Rapp MD

## 2025-03-04 NOTE — CARE PLAN
The patient's goals for the shift include      The clinical goals for the shift include Pt will remain hds throughout shift.      Problem: Safety - Adult  Goal: Free from fall injury  Outcome: Progressing     Problem: Skin  Goal: Decreased wound size/increased tissue granulation at next dressing change  Outcome: Progressing  Goal: Participates in plan/prevention/treatment measures  Outcome: Progressing  Goal: Prevent/manage excess moisture  Outcome: Progressing  Flowsheets (Taken 3/4/2025 1925)  Prevent/manage excess moisture:   Cleanse incontinence/protect with barrier cream   Follow provider orders for dressing changes   Monitor for/manage infection if present  Goal: Prevent/minimize sheer/friction injuries  Outcome: Progressing  Goal: Promote/optimize nutrition  Outcome: Progressing  Goal: Promote skin healing  Outcome: Progressing

## 2025-03-04 NOTE — PROGRESS NOTES
Manolo Bashir is a 69 y.o. male on day 5 of admission presenting with Acute chest pain.    Subjective   Interval History: Patient seen and examined at bedside. Endorses fatigue, generalized pruritus without rash (ongoing). Improved cough, improved scrotal rash. Denies N/V/D, productive cough, subjective fevers, or rash.           Objective   Range of Vitals (last 24 hours)  Heart Rate:  [67-80]   Temp:  [35.6 °C (96.1 °F)-36.7 °C (98.1 °F)]   Resp:  [16-18]   BP: (142-146)/(65-70)   SpO2:  [98 %-100 %]   Daily Weight  02/27/25 : 68 kg (150 lb)    Body mass index is 22.81 kg/m².    Physical Exam  GENERAL APPEARANCE: 69-year-old AA male, alert and cooperative, and appears to be in no acute distress.  THROAT: Oral mucosa moist, no oral lesions seen.   CARDIAC: Regular rate and rhythm. + murmur appreciated.   LUNGS: Scattered crackles bilaterally. No wheezing. Normal work of breathing.  ABDOMEN: Positive bowel sounds. Soft, nondistended, tender to palpation in LLQ, LLQ renal allograft palpated.  NEUROLOGICAL: No obvious focal deficits.  Moving all 4 extremities, speech clear  SKIN: Skin warm and dry.  Scattered linear excoriations on back.  PSYCHIATRIC: Flat affect.  LINES/TUBES/CATHETERS: LUE AV fistula + AV graft, thrill appreciated, bruit heard    Antibiotics  cefepime - 1 gram/50 mL  doxycycline - 100 mg  neomycin-polymyxin-dexAMETHasone - 3.5mg/mL-10,000 unit/mL-0.1 %, 3.5mg/mL-10,000 unit/mL-0.1 %, 3.5mg/mL-10,000 unit/mL-0.1 %    Relevant Results  Labs  Results from last 72 hours   Lab Units 03/04/25  0527 03/03/25  0554 03/02/25  0511   WBC AUTO x10*3/uL 3.3* 4.7 2.2*  2.2*   HEMOGLOBIN g/dL 9.5* 10.7* 9.7*  9.7*   HEMATOCRIT % 29.2* 33.5* 30.9*  30.9*   PLATELETS AUTO x10*3/uL 68* 90* 71*  71*   NEUTROS PCT AUTO % 65.2 62.0 58.2   LYMPHS PCT AUTO % 17.0 21.8 23.9   MONOS PCT AUTO % 13.9 11.0 11.9   EOS PCT AUTO % 3.0 4.4 4.6     Results from last 72 hours   Lab Units 03/04/25  0527 03/03/25  0554  03/02/25  0511   SODIUM mmol/L 133* 136 138   POTASSIUM mmol/L 4.7 4.1 3.8   CHLORIDE mmol/L 95* 96* 96*   CO2 mmol/L 29 26 33*   BUN mg/dL 28* 20 14   CREATININE mg/dL 10.36* 8.50* 5.87*   GLUCOSE mg/dL 112* 40* 135*   CALCIUM mg/dL 8.6 8.4* 8.3*   ANION GAP mmol/L 14 18 13   EGFR mL/min/1.73m*2 5* 6* 10*   PHOSPHORUS mg/dL 3.4 2.7 3.4     Results from last 72 hours   Lab Units 03/04/25  0527 03/03/25  0554 03/02/25  1140 03/02/25  0511   PROTEIN TOTAL g/dL  --   --  6.5  --    ALBUMIN g/dL 3.1* 3.2*  --  3.2*     Estimated Creatinine Clearance: 6.6 mL/min (A) (by C-G formula based on SCr of 10.36 mg/dL (H)).  C-Reactive Protein   Date Value Ref Range Status   02/28/2025 1.88 (H) <1.00 mg/dL Final   02/01/2024 0.57 <1.00 mg/dL Final     CRP   Date Value Ref Range Status   10/20/2022 3.17 (A) mg/dL Final     Comment:     REF VALUE  < 1.00     Estimated Creatinine Clearance: 6.6 mL/min (A) (by C-G formula based on SCr of 10.36 mg/dL (H)).    Microbiology   BlCx 2/27/25 x2 NGTD  MRSA screen 3/2/25  negative       Imaging   CT CHEST WO IV CONTRAST;  3/4/2025 1:09 pm      INDICATION:  Signs/Symptoms:cough, fluid status.  IMPRESSION:  1. Improvement in pulmonary edema, resolution of left-sided pleural  effusion, and significant improvement in right-sided pleural effusion.  2. The main pulmonary artery is mildly dilated measures up to 3.5 cm  in diameter which can be seen in setting of pulmonary arterial  hypertension among other considerations.  3. Similar appearing cardiomegaly. Stable mildly dilated ascending  thoracic aorta measuring 3.9 cm.  4. Chronic appearing fracture/deformity of the right coracoid process.  5. Other chronic findings as above.    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  Recent history of C. diff (treated w/ vancomycin PO 10 day course EOT 2/6/25) as well as development of graft intolerance over past 2 months, currently being evaluated for nephrectomy due to persistent graft related left lower quadrant pain. Recently received course of empiric doxycycline 100 mg BID x 10 days + levofloxacin x 5 days for suspected PNA.     Now admitted on 2/27/25 presenting with sudden onset sharp chest pain, associated with productive cough, sinus pressure and pain, all of which have resolved since admission. Continues to have LLQ pain at allograft site, also complained of rash in genital area. Denies N/V/D, does not make urine (occasional dribbling)     Afebrile, BlCx 2/27/25 x2 NGTD, COVID/FLU negative 2/28,   Pancytopenia w/ WBC 2.6, ANC 1.02.  Strep, legionella Ag not completed due to anuria.  CRP 1.88, Procal 0.28.     T-spot and Syphilis Ab negative in 7/2024. HIV screen negative 8/2024.        Impression:  #Atypical pneumonia  #Chronic Multifocal pulmonary GGO - present on multiple imaging studies over minimum past 6 mo  #Suspected viral lower respiratory tract infection w/ sinusitis  #Renal allograft intolerance      In the setting of chronic ground glass opacities on lung imaging, with recurrent episodes of cough which improves with short courses of antibiotics, suspect atypical pneumonia.   Given patient's transplant and immunosuppressed status, will pursue broad workup to rule out less common etiologies (such as TB, Nocardia, Histoplasma, Coccidio, Mycoplasma pneumoniae, Legionella...) in addition to common.   -pending respiratory viral panel,   -pending fungitell and serum Histo Ag, can also add aspergillus galactomannan, Blastomyces Ab  -pending tspot  -cannot rule out allograft infection due to anuria         Recommendations:  -please start isavuconazole PO w/ loading dose  372mg q8 x 48 hours, followed by 372mg daily, tentative course 3 months.   -please start augmentin 500mg q24 PO for 6 week course  -Monitor labs with CBC/diff and CMP weekly, please fax to [phone] Attn. Dr. Preston.  -ID will arrange f/u around 4/15/25  -if able to produce sputum sample, please send for bacterial, fungal, and AFB cultures,   -appreciate pulmonology evaluation/consideration for bronchoscopy and BAL - if bronch indicated in future, please send for bacterial, fungal, and AFB cultures, MTB/Rif PCR, and Pneumocystis PCR  -continue topical clotrimazole or terbinafine for scrotal lesions      Patient discussed with Dr. Preston. ID will sign off at this time, please reach out with any questions or concerns.    Leora Davis MD  ID Fellow, PGY V.   For questions on [facility] patients please reach out via [facility] chat with any questions or concerns.

## 2025-03-04 NOTE — CARE PLAN
Problem: Safety - Adult  Goal: Free from fall injury  Outcome: Progressing     Problem: Pain  Goal: Takes deep breaths with improved pain control throughout the shift  Outcome: Progressing   The patient's goals for the shift include      The clinical goals for the shift include Pt will remain safee andd free from falls

## 2025-03-04 NOTE — PROCEDURES
"DIALYSIS NOTE:    Seen and examined during hemodialysis, undergoing treatment per submitted orders: 3 K, 2.5 Ca, 3.75  hours. Fluid removal 3 liters, as tolerated (keep SBP> 90mmHg).     /65   Pulse 69   Temp 36.2 °C (97.2 °F) (Temporal)   Resp 16   Ht 1.727 m (5' 8\")   Wt 68 kg (150 lb)   SpO2 98%   BMI 22.81 kg/m²     Scheduled medications  acetaminophen, 975 mg, oral, TID  carvedilol, 37.5 mg, oral, BID  cefepime, 1 g, intravenous, q24h  cinacalcet, 30 mg, oral, Daily  clotrimazole, , Topical, BID  epoetin aida or biosimilar, 10,000 Units, intravenous, Once per day on Tuesday Thursday Saturday  heparin (porcine), 5,000 Units, subcutaneous, q8h ZOË  insulin glargine, 10 Units, subcutaneous, q AM  insulin lispro, 0-5 Units, subcutaneous, TID AC  isosorbide mononitrate ER, 60 mg, oral, Daily  lidocaine, 2 patch, transdermal, Daily  metoclopramide, 5 mg, oral, TID AC  neomycin-polymyxin-dexAMETHasone, 2 drop, Left Eye, q AM  NIFEdipine ER, 90 mg, oral, BID  oxyCODONE, 5 mg, oral, Once  pantoprazole, 40 mg, oral, Daily before breakfast  polyethylene glycol, 17 g, oral, Daily  pramoxine, , Topical, TID  pravastatin, 10 mg, oral, Nightly  predniSONE, 5 mg, oral, Daily  sevelamer carbonate, 800 mg, oral, TID AC  tacrolimus, 0.5 mg, oral, q12h [facility]  vitamin B complex-vitamin C-folic acid, 1 capsule, oral, Daily      Continuous medications     PRN medications  PRN medications: benzonatate, dextrose, dextrose, glucagon, glucagon, nitroglycerin, ondansetron, oxyCODONE, oxyCODONE, sodium chloride, tiZANidine    Recent Results (from the past 24 hours)   POCT GLUCOSE    Collection Time: 03/03/25 12:36 PM   Result Value Ref Range    POCT Glucose 180 (H) 74 - 99 mg/dL   POCT GLUCOSE    Collection Time: 03/03/25  4:46 PM   Result Value Ref Range    POCT Glucose 224 (H) 74 - 99 mg/dL   POCT GLUCOSE    Collection Time: 03/03/25  8:47 PM   Result Value Ref Range    POCT Glucose 104 (H) 74 - 99 mg/dL   Tacrolimus level    " Collection Time: 03/04/25  5:27 AM   Result Value Ref Range    Tacrolimus  2.0 <=15.0 ng/mL   CBC and Auto Differential    Collection Time: 03/04/25  5:27 AM   Result Value Ref Range    WBC 3.3 (L) 4.4 - 11.3 x10*3/uL    nRBC 0.0 0.0 - 0.0 /100 WBCs    RBC 3.42 (L) 4.50 - 5.90 x10*6/uL    Hemoglobin 9.5 (L) 13.5 - 17.5 g/dL    Hematocrit 29.2 (L) 41.0 - 52.0 %    MCV 85 80 - 100 fL    MCH 27.8 26.0 - 34.0 pg    MCHC 32.5 32.0 - 36.0 g/dL    RDW 14.8 (H) 11.5 - 14.5 %    Platelets 68 (L) 150 - 450 x10*3/uL    Neutrophils % 65.2 40.0 - 80.0 %    Immature Granulocytes %, Automated 0.6 0.0 - 0.9 %    Lymphocytes % 17.0 13.0 - 44.0 %    Monocytes % 13.9 2.0 - 10.0 %    Eosinophils % 3.0 0.0 - 6.0 %    Basophils % 0.3 0.0 - 2.0 %    Neutrophils Absolute 2.15 1.20 - 7.70 x10*3/uL    Immature Granulocytes Absolute, Automated 0.02 0.00 - 0.70 x10*3/uL    Lymphocytes Absolute 0.56 (L) 1.20 - 4.80 x10*3/uL    Monocytes Absolute 0.46 0.10 - 1.00 x10*3/uL    Eosinophils Absolute 0.10 0.00 - 0.70 x10*3/uL    Basophils Absolute 0.01 0.00 - 0.10 x10*3/uL   Renal function panel    Collection Time: 03/04/25  5:27 AM   Result Value Ref Range    Glucose 112 (H) 74 - 99 mg/dL    Sodium 133 (L) 136 - 145 mmol/L    Potassium 4.7 3.5 - 5.3 mmol/L    Chloride 95 (L) 98 - 107 mmol/L    Bicarbonate 29 21 - 32 mmol/L    Anion Gap 14 10 - 20 mmol/L    Urea Nitrogen 28 (H) 6 - 23 mg/dL    Creatinine 10.36 (H) 0.50 - 1.30 mg/dL    eGFR 5 (L) >60 mL/min/1.73m*2    Calcium 8.6 8.6 - 10.6 mg/dL    Phosphorus 3.4 2.5 - 4.9 mg/dL    Albumin 3.1 (L) 3.4 - 5.0 g/dL   Magnesium    Collection Time: 03/04/25  5:27 AM   Result Value Ref Range    Magnesium 1.93 1.60 - 2.40 mg/dL

## 2025-03-04 NOTE — PROGRESS NOTES
Physical Therapy                 Therapy Communication Note    Patient Name: Manolo Bashir  MRN: [id number]  Department: [place] [address]  Room: [address] [place] [address]  Today's Date: 3/4/2025     Discipline: Physical Therapy    PT Missed Visit: Yes     Missed Visit Reason: Missed Visit Reason: Other (Comment) (per RN [facility], pt going down for stat CT now)    Missed Time: Attempt    Comment: 12:49pm

## 2025-03-04 NOTE — DISCHARGE INSTRUCTIONS
Dear Mr. Bashir,    You were admitted to the hospital because of chest pain, high blood pressure, shortness of breath, cough, and congestion. You received pain/cough/allergy medications and dialysis in the hospital which greatly helped your shortness of breath, chest pain, and cough. Additionally you were seen by the infectious disease doctors and started on antibiotics and antifungals that also greatly improved your symptoms. You will follow up with them outpatient and you will need weekly labs done after you are discharged for them to review. The pulmonologist (lung doctors) evaluated you and will continue to monitor you as an outpatient with repeat CT imaging to see if you may need a bronchoscopy and a thoracocentesis, both of which are procedures that they can use to test for infections in your lung. Transplant nephrology (transplant kidney team) continued to see you in the hospital and they recommended nephrectomy with the transplant surgery team. They will work together to schedule you for a surgery to remove your kidney that has been bothering you.     We closely monitored your blood pressure and blood sugar while you were in the hospital, both of which were low many times. Therefore we want you to continue taking your coreg but do not take it when your top blood pressure number (systolic) is less than 140. We decreased your Lantus insulin down to 6 from 18 and stopped your insulin Humalog that you got scheduled with meals because your blood sugar was low. Please follow up with your primary care doctor about these medications. Please see important appointments and medication changes below.       Medications:  -Please START  Augmentin one tablet daily (antibiotics)  Benzonatate as needed (for cough)  Clotrimazole (for groin wounds)  Epogen (to help your body make blood cells)  Cresemba please take 2 pills 3/6 at 10pm , 2 pills 3/7 at 6am, then 2 pills once a day starting 3/8 (antifungal)  Ocean nose spray as  needed (for congestion or runny nose)    -Please STOP  Calcitriol (for low vit d)  Doxycycline (antibiotic)  Vancomycin (antibiotic)  Levaquin (antibiotic)    -Please CHANGE how you take   Coreg 3 tablets (37.5 mg) by mouth 2 times a day. Hold for systolic blood pressure <140 and heart rate <60   Maxitrol 2 drops left eye every morning (eye drops)  Prednisone 5mg daily   Insulin LANTUS decreased from 18 units to 6 units (for high blood sugar with diabetes)  Insulin HUMALOG stopped scheduled HUMALOG, continue meal correction with guidelines below   (0 unit(s) if Blood glucose is between   1 unit(s) if Blood glucose is between 151-200  2 unit(s) if Blood glucose is between 201-250  3 unit(s) if Blood glucose is between 251-300  4 unit(s) if Blood glucose is between 301-350  5 unit(s) if Blood glucose is between 351-400)          Follow up:  -Please schedule an appointment with your primary care doctor within 1 week (referral has been placed)    -Please go to the lab for CBC and CMP once a week, these results will be reviewed by infectious disease doctors     -Please follow up with your Infectious Disease doctors (referral placed) to management your cough/congestion symptoms as well as your antibiotic and antifungal medications    -You have a GI appointment 3/26 at 11:40am at [facility] with Melia Qiu    -You have a podiatry appointment with Daxa Cote 4/7 at 11am at [facility]      -You will have nephrectomy (kidney removal) scheduled with transplant surgery and your transplant kidney doctors.     -You have an appointment scheduled with transplant nephrology 4/23 with Dr. Dominguez at [facility] 4/23 at 1:40pm     -You have an appointment 4/22 with hematology/oncology Dr. Penn at [facility] at 2:30pm    -You have an appointment scheduled with Pulmonology (lung doctors) 4/10 1:40pm at [facility] with Chasidy Cabrales       -CT scan: We have placed an order for you to have a  repeat CT scan in approximately 4-6 weeks, to be followed up on by your PCP/pulmonologist. Please call [phone] to schedule the CT scan at a location at time that works for you. Please have this done before your appointment with the pulmonologist on 4/10/25. edule the CT scan at a location at time that works for you. Please have this done before your appointment with the pulmonologist on 4/10/25.

## 2025-03-04 NOTE — PROGRESS NOTES
03/04/25 1413   Discharge Planning   Living Arrangements Children   Support Systems Children   Assistance Needed Independent for adls   Type of Residence Private residence   Number of Stairs to Enter Residence 5   Number of Stairs Within Residence 14   Home or Post Acute Services None   Expected Discharge Disposition Home   Does the patient need discharge transport arranged? No   RoundTrip coordination needed? No   Financial Resource Strain   How hard is it for you to pay for the very basics like food, housing, medical care, and heating? Not hard   Housing Stability   In the last 12 months, was there a time when you were not able to pay the mortgage or rent on time? N   At any time in the past 12 months, were you homeless or living in a shelter (including now)? N   Transportation Needs   In the past 12 months, has lack of transportation kept you from medical appointments or from getting medications? no   In the past 12 months, has lack of transportation kept you from meetings, work, or from getting things needed for daily living? No   Stroke Family Assessment   Stroke Family Assessment Needed No   Intensity of Service   Intensity of Service 0-30 min       Previous Home Care: NA  DME: glucometer and testing supplies  Pharmacy: [facility]  PCP:  Elma Hutton CNP; last visit 1-2 months ago  Met with patient at bedside, provided introduction of self and role. Patient states he lives at home with daughter. Patient states he is independent for adls; safe at home. Patient states either friends or family provide transport to appointments or HD. Patient receives HD at [facility] and states he was there last week. Patient states no concerns obtaining/affording medications; states no social/financial concerns. Patient states family to provide transport home at time of discharge. Will continue to follow for discharge planning needs.     Myah GHOSH, RN  Transitional Care Coordinator  ([facility]) [phone]

## 2025-03-04 NOTE — PROGRESS NOTES
Occupational Therapy                 Therapy Communication Note    Patient Name: Manolo Bashir  MRN: [id number]  Department: [facility]  Room: 302/302-A  Today's Date: 3/4/2025     Discipline: Occupational Therapy    OT Missed Visit: Yes     Missed Visit Reason: Missed Visit Reason:  (Pt off floor @ dialysis - will reattempt as schedule permits)    Missed Time: Attempt    ---------------  Tamika Perkins (OTR/L, OTD)  Inpatient Occupational Therapist   Rehab Office: [phone]

## 2025-03-04 NOTE — NURSING NOTE
Report from Sending RN:    Report From: Johanna  Recent Surgery of Procedure: No  Baseline Level of Consciousness (LOC): A&O x 4  Oxygen Use: No  Type: NA  Diabetic: Yes  Last BP Med Given Day of Dialysis: see MAR  Last Pain Med Given: none within 24 hrs  Lab Tests to be Obtained with Dialysis: Labs ordered for AM floor draw - will recheck once on unit for prior draw  Blood Transfusion to be Given During Dialysis: No  Available IV Access: Yes, #20 RFA; #22 RH  Medications to be Administered During Dialysis: No  Continuous IV Infusion Running: No  Restraints on Currently or in the Last 24 Hours: No  Hand-Off Communication: No issues or concerns overnight.  Stable and ready for HD.  Transports via cart and able to stand for weight.  Dialysis Catheter Dressing: na - AVCATRACHITA DUEÑAS  Last Dressing Change: NA

## 2025-03-04 NOTE — PROGRESS NOTES
Physical Therapy                 Therapy Communication Note    Patient Name: Manolo Bashir  MRN: [id number]  [facility] [facility]  Room: 302/302-A  Today's Date: 3/4/2025     Discipline: Physical Therapy    PT Missed Visit: Yes     Missed Visit Reason: Missed Visit Reason: Other (Comment) (pt off floor at dialysis)    Missed Time: Attempt    Comment: 7:59am

## 2025-03-04 NOTE — PROGRESS NOTES
Occupational Therapy                 Therapy Communication Note    Patient Name: Manolo Bashir  MRN: [id number]  Department: [place] [address]  Room: [address] [place], [place] [address]  Today's Date: 3/4/2025     Discipline: Occupational Therapy    OT Missed Visit: Yes     Missed Visit Reason: Missed Visit Reason: Patient placed on medical hold (per RN, pt going down for stat CT; will reattempt as pt is medically cleared)    Missed Time: Attempt    ---------------  Tamika Perkins (OTR/L, OTD)  Inpatient Occupational Therapist   Rehab Office: [phone]

## 2025-03-04 NOTE — NURSING NOTE
Report to Receiving RN:    Report To: [facility] via Secure Chat  Time Report Called: 1052  Hand-Off Communication: 3 L of fluid removed, /82, P 77  Complications During Treatment: No  Ultrafiltration Treatment: Yes  Medications Administered During Dialysis: No  Blood Products Administered During Dialysis: No  Labs Sent During Dialysis: No  Heparin Drip Rate Changes: N/A  Dialysis Catheter Dressing: NA  Last Dressing Change: NA    Last Updated: 10:50 AM by JOSE BARRERA

## 2025-03-05 ENCOUNTER — APPOINTMENT (OUTPATIENT)
Dept: RADIOLOGY | Facility: HOSPITAL | Age: 70
End: 2025-03-05
Payer: MEDICARE

## 2025-03-05 DIAGNOSIS — R91.8 GROUND GLASS OPACITY PRESENT ON IMAGING OF LUNG: ICD-10-CM

## 2025-03-05 LAB
ALBUMIN SERPL BCP-MCNC: 3.2 G/DL (ref 3.4–5)
ANION GAP SERPL CALC-SCNC: 13 MMOL/L (ref 10–20)
BASOPHILS # BLD AUTO: 0.01 X10*3/UL (ref 0–0.1)
BASOPHILS NFR BLD AUTO: 0.3 %
BUN SERPL-MCNC: 22 MG/DL (ref 6–23)
CALCIUM SERPL-MCNC: 8.7 MG/DL (ref 8.6–10.6)
CHLORIDE SERPL-SCNC: 96 MMOL/L (ref 98–107)
CO2 SERPL-SCNC: 31 MMOL/L (ref 21–32)
CREAT SERPL-MCNC: 7.48 MG/DL (ref 0.5–1.3)
EGFRCR SERPLBLD CKD-EPI 2021: 7 ML/MIN/1.73M*2
EOSINOPHIL # BLD AUTO: 0.08 X10*3/UL (ref 0–0.7)
EOSINOPHIL NFR BLD AUTO: 2.3 %
ERYTHROCYTE [DISTWIDTH] IN BLOOD BY AUTOMATED COUNT: 15.3 % (ref 11.5–14.5)
GLUCOSE BLD MANUAL STRIP-MCNC: 122 MG/DL (ref 74–99)
GLUCOSE BLD MANUAL STRIP-MCNC: 146 MG/DL (ref 74–99)
GLUCOSE BLD MANUAL STRIP-MCNC: 166 MG/DL (ref 74–99)
GLUCOSE BLD MANUAL STRIP-MCNC: 52 MG/DL (ref 74–99)
GLUCOSE BLD MANUAL STRIP-MCNC: 62 MG/DL (ref 74–99)
GLUCOSE BLD MANUAL STRIP-MCNC: 76 MG/DL (ref 74–99)
GLUCOSE BLD MANUAL STRIP-MCNC: 92 MG/DL (ref 74–99)
GLUCOSE BLD MANUAL STRIP-MCNC: 92 MG/DL (ref 74–99)
GLUCOSE SERPL-MCNC: 123 MG/DL (ref 74–99)
HCT VFR BLD AUTO: 29.9 % (ref 41–52)
HGB BLD-MCNC: 9.5 G/DL (ref 13.5–17.5)
IMM GRANULOCYTES # BLD AUTO: 0.02 X10*3/UL (ref 0–0.7)
IMM GRANULOCYTES NFR BLD AUTO: 0.6 % (ref 0–0.9)
LYMPHOCYTES # BLD AUTO: 0.5 X10*3/UL (ref 1.2–4.8)
LYMPHOCYTES NFR BLD AUTO: 14.5 %
MAGNESIUM SERPL-MCNC: 2.09 MG/DL (ref 1.6–2.4)
MCH RBC QN AUTO: 27.4 PG (ref 26–34)
MCHC RBC AUTO-ENTMCNC: 31.8 G/DL (ref 32–36)
MCV RBC AUTO: 86 FL (ref 80–100)
MONOCYTES # BLD AUTO: 0.55 X10*3/UL (ref 0.1–1)
MONOCYTES NFR BLD AUTO: 15.9 %
NEUTROPHILS # BLD AUTO: 2.29 X10*3/UL (ref 1.2–7.7)
NEUTROPHILS NFR BLD AUTO: 66.4 %
NRBC BLD-RTO: 0 /100 WBCS (ref 0–0)
PHOSPHATE SERPL-MCNC: 2.9 MG/DL (ref 2.5–4.9)
PLATELET # BLD AUTO: 95 X10*3/UL (ref 150–450)
POTASSIUM SERPL-SCNC: 4.4 MMOL/L (ref 3.5–5.3)
RBC # BLD AUTO: 3.47 X10*6/UL (ref 4.5–5.9)
SCAN RESULT: NORMAL
SODIUM SERPL-SCNC: 136 MMOL/L (ref 136–145)
TACROLIMUS BLD-MCNC: <2 NG/ML
WBC # BLD AUTO: 3.5 X10*3/UL (ref 4.4–11.3)

## 2025-03-05 PROCEDURE — 80069 RENAL FUNCTION PANEL: CPT | Performed by: NUTRITIONIST

## 2025-03-05 PROCEDURE — 2500000001 HC RX 250 WO HCPCS SELF ADMINISTERED DRUGS (ALT 637 FOR MEDICARE OP)

## 2025-03-05 PROCEDURE — 97161 PT EVAL LOW COMPLEX 20 MIN: CPT | Mod: GP | Performed by: PHYSICAL THERAPIST

## 2025-03-05 PROCEDURE — RXMED WILLOW AMBULATORY MEDICATION CHARGE

## 2025-03-05 PROCEDURE — 2500000002 HC RX 250 W HCPCS SELF ADMINISTERED DRUGS (ALT 637 FOR MEDICARE OP, ALT 636 FOR OP/ED)

## 2025-03-05 PROCEDURE — 2500000005 HC RX 250 GENERAL PHARMACY W/O HCPCS: Performed by: NUTRITIONIST

## 2025-03-05 PROCEDURE — 99233 SBSQ HOSP IP/OBS HIGH 50: CPT | Performed by: STUDENT IN AN ORGANIZED HEALTH CARE EDUCATION/TRAINING PROGRAM

## 2025-03-05 PROCEDURE — 99232 SBSQ HOSP IP/OBS MODERATE 35: CPT | Performed by: NURSE PRACTITIONER

## 2025-03-05 PROCEDURE — 99222 1ST HOSP IP/OBS MODERATE 55: CPT | Performed by: STUDENT IN AN ORGANIZED HEALTH CARE EDUCATION/TRAINING PROGRAM

## 2025-03-05 PROCEDURE — 2500000005 HC RX 250 GENERAL PHARMACY W/O HCPCS

## 2025-03-05 PROCEDURE — 1100000001 HC PRIVATE ROOM DAILY

## 2025-03-05 PROCEDURE — 2500000004 HC RX 250 GENERAL PHARMACY W/ HCPCS (ALT 636 FOR OP/ED)

## 2025-03-05 PROCEDURE — 70450 CT HEAD/BRAIN W/O DYE: CPT | Performed by: RADIOLOGY

## 2025-03-05 PROCEDURE — 80197 ASSAY OF TACROLIMUS: CPT

## 2025-03-05 PROCEDURE — 97165 OT EVAL LOW COMPLEX 30 MIN: CPT | Mod: GO

## 2025-03-05 PROCEDURE — 2500000001 HC RX 250 WO HCPCS SELF ADMINISTERED DRUGS (ALT 637 FOR MEDICARE OP): Performed by: NUTRITIONIST

## 2025-03-05 PROCEDURE — 70450 CT HEAD/BRAIN W/O DYE: CPT

## 2025-03-05 PROCEDURE — 85025 COMPLETE CBC W/AUTO DIFF WBC: CPT | Performed by: NUTRITIONIST

## 2025-03-05 PROCEDURE — 99233 SBSQ HOSP IP/OBS HIGH 50: CPT | Performed by: NUTRITIONIST

## 2025-03-05 PROCEDURE — 82947 ASSAY GLUCOSE BLOOD QUANT: CPT

## 2025-03-05 PROCEDURE — 36415 COLL VENOUS BLD VENIPUNCTURE: CPT

## 2025-03-05 PROCEDURE — 83735 ASSAY OF MAGNESIUM: CPT | Performed by: NUTRITIONIST

## 2025-03-05 RX ORDER — INSULIN GLARGINE 100 [IU]/ML
12 INJECTION, SOLUTION SUBCUTANEOUS EVERY MORNING
Qty: 30 EACH | Refills: 1 | Status: CANCELLED | OUTPATIENT
Start: 2025-03-05

## 2025-03-05 RX ORDER — AMOXICILLIN AND CLAVULANATE POTASSIUM 500; 125 MG/1; MG/1
1 TABLET, FILM COATED ORAL
Qty: 42 TABLET | Refills: 0 | Status: ON HOLD | OUTPATIENT
Start: 2025-03-06 | End: 2025-04-17

## 2025-03-05 RX ORDER — INSULIN GLARGINE 100 [IU]/ML
5 INJECTION, SOLUTION SUBCUTANEOUS EVERY MORNING
Status: DISCONTINUED | OUTPATIENT
Start: 2025-03-06 | End: 2025-03-06

## 2025-03-05 RX ORDER — AMOXICILLIN AND CLAVULANATE POTASSIUM 500; 125 MG/1; MG/1
1 TABLET, FILM COATED ORAL
Qty: 41 TABLET | Refills: 0 | Status: CANCELLED | OUTPATIENT
Start: 2025-03-06 | End: 2025-04-16

## 2025-03-05 RX ORDER — LIDOCAINE 560 MG/1
2 PATCH PERCUTANEOUS; TOPICAL; TRANSDERMAL DAILY
Qty: 60 PATCH | Refills: 0 | Status: CANCELLED | OUTPATIENT
Start: 2025-03-06

## 2025-03-05 RX ORDER — AMOXICILLIN AND CLAVULANATE POTASSIUM 500; 125 MG/1; MG/1
1 TABLET, FILM COATED ORAL
Status: DISCONTINUED | OUTPATIENT
Start: 2025-03-05 | End: 2025-03-06 | Stop reason: HOSPADM

## 2025-03-05 RX ADMIN — CARVEDILOL 37.5 MG: 12.5 TABLET, FILM COATED ORAL at 09:39

## 2025-03-05 RX ADMIN — CLOTRIMAZOLE: 10 CREAM TOPICAL at 20:53

## 2025-03-05 RX ADMIN — CLOTRIMAZOLE: 10 CREAM TOPICAL at 09:42

## 2025-03-05 RX ADMIN — PRAMOXINE HYDROCHLORIDE: 10 LOTION TOPICAL at 09:41

## 2025-03-05 RX ADMIN — METOCLOPRAMIDE 5 MG: 10 TABLET ORAL at 06:09

## 2025-03-05 RX ADMIN — PANTOPRAZOLE SODIUM 40 MG: 40 TABLET, DELAYED RELEASE ORAL at 06:09

## 2025-03-05 RX ADMIN — NIFEDIPINE 90 MG: 90 TABLET, FILM COATED, EXTENDED RELEASE ORAL at 20:50

## 2025-03-05 RX ADMIN — AMOXICILLIN AND CLAVULANATE POTASSIUM 1 TABLET: 500; 125 TABLET, FILM COATED ORAL at 09:40

## 2025-03-05 RX ADMIN — SEVELAMER CARBONATE 800 MG: 800 TABLET, FILM COATED ORAL at 17:14

## 2025-03-05 RX ADMIN — NIFEDIPINE 90 MG: 90 TABLET, FILM COATED, EXTENDED RELEASE ORAL at 09:40

## 2025-03-05 RX ADMIN — RENO CAPS 1 CAPSULE: 100; 1.5; 1.7; 20; 10; 1; 150; 5; 6 CAPSULE ORAL at 09:43

## 2025-03-05 RX ADMIN — CARVEDILOL 37.5 MG: 12.5 TABLET, FILM COATED ORAL at 20:50

## 2025-03-05 RX ADMIN — TACROLIMUS 0.5 MG: 0.5 CAPSULE ORAL at 17:33

## 2025-03-05 RX ADMIN — SEVELAMER CARBONATE 800 MG: 800 TABLET, FILM COATED ORAL at 09:39

## 2025-03-05 RX ADMIN — SEVELAMER CARBONATE 800 MG: 800 TABLET, FILM COATED ORAL at 12:34

## 2025-03-05 RX ADMIN — NEOMYCIN SULFATE, POLYMYXIN B SULFATE AND DEXAMETHASONE 2 DROP: 3.5; 10000; 1 SUSPENSION/ DROPS OPHTHALMIC at 09:41

## 2025-03-05 RX ADMIN — ACETAMINOPHEN 975 MG: 325 TABLET ORAL at 17:14

## 2025-03-05 RX ADMIN — ISAVUCONAZONIUM SULFATE 372 MG: 186 CAPSULE ORAL at 09:40

## 2025-03-05 RX ADMIN — PREDNISONE 5 MG: 5 TABLET ORAL at 09:39

## 2025-03-05 RX ADMIN — ISAVUCONAZONIUM SULFATE 372 MG: 186 CAPSULE ORAL at 17:33

## 2025-03-05 RX ADMIN — TACROLIMUS 0.5 MG: 0.5 CAPSULE ORAL at 05:46

## 2025-03-05 RX ADMIN — LIDOCAINE 4% 2 PATCH: 40 PATCH TOPICAL at 09:42

## 2025-03-05 RX ADMIN — PRAVASTATIN SODIUM 10 MG: 20 TABLET ORAL at 20:50

## 2025-03-05 RX ADMIN — ISOSORBIDE MONONITRATE 60 MG: 30 TABLET, EXTENDED RELEASE ORAL at 09:39

## 2025-03-05 RX ADMIN — CINACALCET HYDROCHLORIDE 30 MG: 30 TABLET, FILM COATED ORAL at 09:40

## 2025-03-05 RX ADMIN — ACETAMINOPHEN 975 MG: 325 TABLET ORAL at 09:39

## 2025-03-05 RX ADMIN — DEXTROSE MONOHYDRATE 12.5 G: 25 INJECTION, SOLUTION INTRAVENOUS at 17:32

## 2025-03-05 RX ADMIN — PRAMOXINE HYDROCHLORIDE: 10 LOTION TOPICAL at 20:52

## 2025-03-05 RX ADMIN — INSULIN GLARGINE 10 UNITS: 100 INJECTION, SOLUTION SUBCUTANEOUS at 09:43

## 2025-03-05 RX ADMIN — ACETAMINOPHEN 975 MG: 325 TABLET ORAL at 20:50

## 2025-03-05 ASSESSMENT — PAIN - FUNCTIONAL ASSESSMENT
PAIN_FUNCTIONAL_ASSESSMENT: 0-10
PAIN_FUNCTIONAL_ASSESSMENT: 0-10

## 2025-03-05 ASSESSMENT — COGNITIVE AND FUNCTIONAL STATUS - GENERAL
DAILY ACTIVITIY SCORE: 23
MOBILITY SCORE: 23
HELP NEEDED FOR BATHING: A LITTLE
CLIMB 3 TO 5 STEPS WITH RAILING: A LITTLE

## 2025-03-05 ASSESSMENT — ACTIVITIES OF DAILY LIVING (ADL)
BATHING_ASSISTANCE: STAND BY
ADL_ASSISTANCE: INDEPENDENT
ADL_ASSISTANCE: INDEPENDENT
ADLS_ADDRESSED: YES

## 2025-03-05 ASSESSMENT — PAIN SCALES - GENERAL: PAINLEVEL_OUTOF10: 0 - NO PAIN

## 2025-03-05 NOTE — PROGRESS NOTES
Occupational Therapy    Evaluation    Patient Name: Manolo Bashir  MRN: [id number]  Department: [facility] 3  Room: [address] [place] [address]  Today's Date: 3/5/2025  Time Calculation  Start Time: 0807  Stop Time: 0825  Time Calculation (min): 18 min        Assessment:  End of Session Communication: Bedside nurse, Physician  End of Session Patient Position: Up in chair, Alarm off, not on at start of session  OT Assessment Results:  (Near baseline func)    Plan:  No Skilled OT: No acute OT goals identified  OT Frequency: OT eval only  OT Discharge Recommendations: No further acute OT, No OT needed after discharge  OT - OK to Discharge: Yes      Subjective   Current Problem:  1. Pleural effusion associated with pulmonary infection  CT chest wo IV contrast      2. Acute chest pain        3. Pleural effusion  CANCELED: Thoracentesis      4. Chronic rejection of kidney transplant ([facility])  Referral to Solid Organ Transplant Team      5. Chronic heart failure with preserved ejection fraction        6. Type 2 diabetes mellitus without complication, with long-term current use of insulin (Multi)          General:  General  Reason for Referral: Admitted 2/24 with allograft pain/hematuria, chest pain; dx: chest pain (volume overload vs. Infection), HTN  Past Medical History Relevant to Rehab: head lice 3/23/24, ESRD on HD now s/p 2 time renal transplant (1992, 2013), CKD 3, IgG and IgA lambda MGUS, DM2, HTN, prostate cancer s/p radical prostatectomy, baseline urinary incontinence, HCV, anemia, OA, cataracts, COVID, adenocarcinoma of prostate, chronic anemia, chronic Hep C, GERD, goiter, MGUS, MRSA, PNA  Missed Visit Reason: Patient placed on medical hold (per RN, pt going down for stat CT; will reattempt as pt is medically cleared)  Prior to Session Communication: Bedside nurse  Patient Position Received: Bed, 2 rail up, Alarm off, not on at start of session  General Comment: Pt supine in bed upon arrival, willing to participate in  therapy.    Pain:  Pain Assessment  Pain Assessment: 0-10  0-10 (Numeric) Pain Score: 0 - No pain    Objective   Cognition:  Overall Cognitive Status: Within Functional Limits  Arousal/Alertness: Appropriate responses to stimuli  Orientation Level: Oriented X4  Following Commands: Follows multistep commands consistently  Insight: Mild    Home Living:  Type of Home: House  Lives With: Adult children (Dtr ([facility] and can assist as needed))  Home Adaptive Equipment: Walker rolling or standard, Cane (does not utilize)  Home Layout: Multi-level (6+6 stairs +HR to bedroom/bathroom; laundry in basement)  Home Access: Stairs to enter with rails  Entrance Stairs-Rails: Both  Entrance Stairs-Number of Steps: 6  Bathroom Shower/Tub: Tub/shower unit  Bathroom Toilet: Handicapped height  Bathroom Equipment: Shower chair with back    Prior Function:  ADL Assistance: Independent  Homemaking Assistance: Independent (shared IADL completion with dtr)  Ambulatory Assistance: Independent  Hand Dominance: Right  Prior Function Comments: Denies fall hx    IADL History:  Current License: Yes  Mode of Transportation: Car  Occupation: On disability  Type of Occupation: Previous   Leisure and Hobbies: Building model cars    ADL:  Eating Assistance: Independent (anticipate)  Grooming Assistance: Independent (oral and hand hygiene while standing sinkside)  Bathing Assistance: Stand by (anticipate)  UE Dressing Assistance: Independent (don/doff hosp gown while in stance)  LE Dressing Assistance: Independent (don/doff pants while seated/standing)  Toileting Assistance with Device: Independent (urination on toilet)    Bed Mobility/Transfers:   Bed Mobility 1  Bed Mobility 1: Supine to sitting  Level of Assistance 1: Independent    Transfer 1  Technique 1: Sit to stand, Stand to sit  Transfer Device 1:  (no AD)  Transfer Level of Assistance 1: Independent    Functional Mobility:  Functional Mobility 1  Comments 1: Pt completes func amb around  room and to-from bathroom with SUP and no AD; no dizziness or SOB noted    Vision:  Vision - Basic Assessment  Current Vision: Wears glasses only for reading  Patient Visual Report:  (Denies acute visual deficits)    Sensation:  Sensation Comment: Endorses intermittent N/T in BUE digits - not present at time of eval and reports does not impact func/FMC    Strength:  Strength Comments: BUE grossly WFL as evidenced by func mob and BADLs    Hand Function:  Gross Grasp: Functional  Coordination: Functional    Outcome Measures:  [facility] Daily Activity  Putting on and taking off regular lower body clothing: None  Bathing (including washing, rinsing, drying): A little  Putting on and taking off regular upper body clothing: None  Toileting, which includes using toilet, bedpan or urinal: None  Taking care of personal grooming such as brushing teeth: None  Eating Meals: None  Daily Activity - Total Score: 23    OT Adult Other Outcome Measures  4AT: Negative    Education Documentation  Handouts, taught by Tamika Perkins OT at 3/5/2025 10:42 AM.  Learner: Patient  Readiness: Acceptance  Method: Explanation  Response: Verbalizes Understanding    Precautions, taught by Tamika Perkins OT at 3/5/2025 10:42 AM.  Learner: Patient  Readiness: Acceptance  Method: Explanation  Response: Verbalizes Understanding    Body Mechanics, taught by Tamika Perkins OT at 3/5/2025 10:42 AM.  Learner: Patient  Readiness: Acceptance  Method: Explanation  Response: Verbalizes Understanding    ADL Training, taught by Tamika Perkins OT at 3/5/2025 10:42 AM.  Learner: Patient  Readiness: Acceptance  Method: Explanation  Response: Verbalizes Understanding    Education Comments  No comments found.        ---------------  Tamika Perkins (OTR/L, OTD)  Inpatient Occupational Therapist   Rehab Office: [phone]

## 2025-03-05 NOTE — CARE PLAN
Problem: Safety - Adult  Goal: Free from fall injury  Outcome: Progressing     Problem: Skin  Goal: Prevent/manage excess moisture  Outcome: Progressing  Goal: Prevent/minimize sheer/friction injuries  Outcome: Progressing  Goal: Promote/optimize nutrition  Outcome: Progressing     Problem: Pain  Goal: Walks with improved pain control throughout the shift  Outcome: Progressing  Goal: Performs ADL's with improved pain control throughout shift  Outcome: Progressing   The patient's goals for the shift include      The clinical goals for the shift include pts blood glusocse will be wdl through shift

## 2025-03-05 NOTE — PROGRESS NOTES
Manolo Bashir is a 69 y.o. male on day 6 of admission presenting with Acute chest pain.      Subjective   Pt concerned that he is stuttered (not heard by medical team), wife and pt concerned about changes in vision making it harder to read on his phone as well as light headedness and back pain.       Objective     Last Recorded Vitals  /72 (Patient Position: Standing)   Pulse 82   Temp 36.6 °C (97.9 °F)   Resp 16   Wt 68 kg (150 lb)   SpO2 100%   Intake/Output last 3 Shifts:  No intake or output data in the 24 hours ending 03/05/25 1155      Admission Weight  Weight: 68 kg (150 lb) (02/27/25 0721)    Daily Weight  02/27/25 : 68 kg (150 lb)    Image Results  CT head wo IV contrast  Narrative: Interpreted By:  Harvey Calderón and Stevens Alex   STUDY:  CT HEAD WO IV CONTRAST;  3/5/2025 11:35 am      INDICATION:  Signs/Symptoms:change in vision.      COMPARISON:  CT head 09/13/2023.      ACCESSION NUMBER(S):  [id number]      ORDERING CLINICIAN:  DEBBIE MARTÍNEZ      TECHNIQUE:  Axial noncontrast CT images of the head. Sagittal and coronal  reformats were provided.      FINDINGS:  BRAIN: No acute intracranial hemorrhage. No mass effect or midline  shift. Gray-white matter interfaces are preserved.Subtle white matter  hypodensities which are nonspecific but likely related to  microangiopathic white matter changes.      VENTRICLES and EXTRA-AXIAL SPACES: Normal.      EXTRACRANIAL SOFT TISSUES:  Within normal limits.      PARANASAL SINUSES/MASTOIDS: The visualized paranasal sinuses and  mastoid air cells are aerated.      BONES AND ORBITS: No displaced skull fracture. No suspicious osseous  lesion.  Orbits are within normal limits.      OTHER FINDINGS: None.      Impression: 1. There is no evidence of acute hemorrhage, mass lesion or acute  infarction.      I personally reviewed the images/study and I agree with the findings  as stated by Chavez Lucas DO PGY-2. This study was interpreted  at  [place], [place].      MACRO:  None      Signed by: Harvey Calderón 3/5/2025 11:52 AM  Dictation workstation:   [id number]      Physical Exam  Constitutional:       Comments: Chronically-ill appearing   HENT:      Head: Normocephalic and atraumatic.      Nose: No rhinorrhea.      Mouth/Throat:      Mouth: Mucous membranes are moist.   Eyes:      General:         Right eye: No discharge.         Left eye: No discharge.      Extraocular Movements: Extraocular movements intact.   Cardiovascular:      Rate and Rhythm: Normal rate.      Heart sounds:      No friction rub.   Pulmonary:      Effort: No respiratory distress.      Breath sounds: No wheezing or rhonchi.   Chest:      Chest wall: No tenderness.   Abdominal:      General: Bowel sounds are normal. There is no distension.      Tenderness: There is abdominal tenderness. There is guarding. There is no rebound.      Comments: Left abd tender   Musculoskeletal:         General: No deformity or signs of injury.      Right lower leg: No edema.      Left lower leg: No edema.   Skin:     General: Skin is warm and dry.      Findings: No rash.   Neurological:      General: No focal deficit present.      Mental Status: He is alert and oriented to person, place, and time. Mental status is at baseline.   Psychiatric:         Mood and Affect: Mood normal.         Behavior: Behavior normal.       CXR 2/27:  No acute cardiopulmonary disease.     CT Chest wo 3/4:  1. Improvement in pulmonary edema, resolution of left-sided pleural  effusion, and significant improvement in right-sided pleural effusion.  2. The main pulmonary artery is mildly dilated measures up to 3.5 cm  in diameter which can be seen in setting of pulmonary arterial  hypertension among other considerations.  3. Similar appearing cardiomegaly. Stable mildly dilated ascending  thoracic aorta measuring 3.9 cm.  4. Chronic appearing fracture/deformity of the right  coracoid process.  5. Other chronic findings as above.    CT Head wo 3/5:  1. There is no evidence of acute hemorrhage, mass lesion or acute  infarction.    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  Patient's chest pain unlikely ACS as EKG and trops negative but responded to nitroglycerin and aspirin. BNP elevated and chest tender to palpation on exam. CT and exam showing fluid up, so pt went to dialysis 2/28 fluid removal, rsv/covid/flu neg, pt with cough +/- productive, after 3 sessions of HD along with starting antibiotics pt has has improvement in resp status. procal: 0.24 rsv/flu/covid  neg crp 1.88 elevated. In preparation for nephrectomy iso immunocompromise and pt with recurrent resp infections we are testing for atypicals and consulted pulm for a potential bronch however because pt resp status has improved with removal of fluid pulm holding on intervention, will plan to get ct chest 3/4 after hd to re-assess needs. Overall pt pain is improved but uncontrolled and resp status is improved.      Dispo:  Pt declines SNF so plan to dc home, transplant surgery planning nephrectomy possibly 3/6 .If not done by 3/6 plan dc or transfer to surgical team.   Outpt: outpt repeat ct outpt 4-6 weeks fu with pulm, fu with pcp, transplant neph        Updates:  -for potential nephrectomy 3/6, pt npo midnight, holding hep at 4am, glargine decreased to 5 units qam from 10, and ssi stopped  -ID following rec empiric antifungal and cw antibiotic: dc cefepime and start 3/5 Augmentin 500 mg/day x 6 weeks + isavuconazole loading and maintenance for 3 months. Per pharmacy will schedule  Augmentin at night after hd   -cw tacrolimus per transplant nephro as isavuconazole does not increase dosage   -cw HD Tu Th Sa using left arm fistula  -cw low dose tizanidine at night (1mg) can max at 2mg maybe bid for muscle spasms causing back pain  -per transplant Consider Neupogen for ANC <0.5.   -transplant nephrology following recs, there is ongoing c/f infection, cw broadened antibiotics instead of home doxy/levofloxacin. Goal is to optimize patient for potential inpatient nephrectomy  though nephrectomy can be rescheduled if pt is not medically optimized during this stay.   -pending histoplasma ag, blastomyces antibody,afb, tspot  -fungitell b d glucan neg, aspergillus galactomannan neg, resp viral panel neg   -Legionella/strep pneumo urine not yet collected as patient is oliguric to anuric  -CT Head wo contrast 3/5 done iso pt concern for stuttering and vision changes but ct wnl  -orthostats negative 3/5 iso pt concern for lightheadedness            #Chest Pain, resolved  #HFpEF, diastolic dysfunction  #Cough,2/2 Edema vs Resp Infection, improving  #SOB, resolved  ::Pt with ESRD high risk for CAD,  sharp CP in sternal region, brought on by exertion and relieved w/nitroglycerin patient however no characteristic ACS chest pain, Trops and EKG however negative, no concerns for Pericarditis, trops neg, bnp elevated, afebrile, leukopenia, immunocompromise, recent steroid burst with taper, still on tacrolimus currently.  Has been on levofloxacin and doxycycline past few days, prescribed outpatient by transplant nephrology.  ::Heart score of 4  ::ED reported tenderness on palpation of chest at presentation with concern for possible MSK pain  ::pT currently denies any CP  -rsv/flu/covid  neg, crp 1.88 elevated, procal: 0.24   Plan:  -sublingual nitroglycerin prn for pain  -tessalon pearls started for cough +/- productive   -cw ns spray    -pain control as below   -fu CT Chest stat 3/4 wo contrast to assess fluid and  resp status for consideration of thoracocentesis or bronch by pulm, pending official read but looks far improved from admission so likely, plan for outpt repeat ct outpt 4-6 weeks as pt greatly improving by getting fluid off  -Pending ID recs for management of resp status and antibiotic reg for discharge  -cw HD Tu Th Sa using left arm fistula, 3/4 3L removed bp/hr wnl   -dc vanc (3/2-3/4) iso MRSA nares negative 3/2, cw cefepime  -blood cx 2/27 ngtd final  -transplant nephrology following recs, there is ongoing c/f infection, cw broadened antibiotics instead of home doxy/levofloxacin. Goal is to optimize patient for potential inpatient nephrectomy  though nephrectomy can be rescheduled if pt is not medically optimized during this stay.   -pending histoplasma ag, fungitell b d glucan, aspergillus galactomannan, blastomyces antibody, afb and tspot, resp viral panel. 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  -S/p renal transplant x 2, currently ESRD on hemodialysis.  -basline oliguirc to anuric  -EBVneg/CMV neg/BK neg DNA.  PLAN  - Transplant nephrology following, prednisone 5 mg daily, tacrolimus 0.5 mg BID  - Tacrolimus level trending   - low threshold for CT AP if pt worsening and with worsening abd pain  -prev nausea and vomiting, home reglan qtc 463  -bp control as below   - patient is oliguric to anuric  - pt stopped calcitriol outpt per HD nephrologist   -infectious workup as above   -pain  management as below    -cw HD Tu Th Sa using left arm fistula, 3/4 3L removed bp/hr wnl   -per transplant Consider Neupogen for ANC <0.5.    -transplant nephrology following recs, there is ongoing c/f infection, cw broadened antibiotics instead of home doxy/levofloxacin. Goal is to optimize patient for potential inpatient nephrectomy  though nephrectomy can be rescheduled if pt is not medically optimized during this stay.        #Chronic Neck/Back pain  #Headaches  Plan:  -cw HD, gluose and bp control  -Started low dose tizanidine at night (1mg) can max at 2mg maybe bid for muscle spasms causing back pain  -for chronic pain tylenol scheduled, lidocaine patches, warm compress, and oxy 2.5 and oxy 5, 3/4 dc oxy 10  iso pt lethargic and unable to work with pt/ot 3/3       #Gastroparesis  #Nausea and vomiting, controlled  -  cw restarted home metoclopramide  - will monitor     #Leucopenia, unresolved  #IgG and IgA lambda MGUS, stable   #Thrombocytopenia, unresolved  - Thrombocytopenia chronic, Plt today 82<<149, may be decreased in the setting of infection vs. Immunosuppression vs. MGUS  -spep (stable elevated M proteins and low but improved albumin)  PLAN  - CTM on daily CBC  - Continue home cinacalcet 30 mg daily  - pt stopped calcitriol outpt per HD nephrologist   -infectious management as above   -per transplant Consider Neupogen for ANC <0.5.         #Hx of recent cataract surgery  Plan:  - Cw home Maxitrol 1 drop L eye qAM      #Severe HTN, controlled   ::likely due to missed morning meds dose vs severe abd pain  ::BP-200s<<157   ::Pt reports his Bps are usually high when he goes for dialysis  ::home  carvedilol 37.5 mg twice daily, Imdur 60 mg as well as nifedipine XR 90 mg twice daily  Received dose of clonidine in the ED,  Plan:  -cw home meds      #T2DM  - Most recent A1c 9.1 12/16/24  ::Bld glucose-348  ::On lantus 18units in am, scheduled 3 units tid, ssi  -Beta-hydroxybutyrate levels-0.10  Plan:  -ac and at  bedtime poct glucose   -carb controlled and renal diet -> npo midnight for nephrectomy 3/6  -glargine decreased to 5 units qam from 10, and ssi stopped for possible nephrectomy 3/6        #HLD  - Continue home pravastatin 10 mg daily     #GERD  - Continue home pantoprazole 40 mg daily     #Pruritus, improving  Plan:  -for itch cw sarna lotion and gave benadryl once 3/4          F: Replete PRN  E: Replete PRN  N: renal and carb controlled ->npo midnight for possible nephrectomy  A: PIV / left arm avf for hd  DVT ppx: Unfractionated heparin-->hold at 4am for nephrectomy     Code Status: Full Code (confirmed on admission)   NOK:  Primary Emergency Contact: KETTY PLASENCIA, Home Phone: [phone]          Constance Rapp MD

## 2025-03-05 NOTE — PROGRESS NOTES
"Manolo Bashir is a 69 y.o. male on day 6 of admission presenting with Acute chest pain.    Subjective   Pt resting in bed with family at bedside.. Denies sob, n/v/d, fever, cough, chills, chest pains, light head, dizziness, constipation , cont with pain to lower back, has been having some itching       Objective     Physical Exam  Vitals and nursing note reviewed.   Cardiovascular:      Rate and Rhythm: Normal rate and regular rhythm.   Pulmonary:      Comments: Jeffrey lung sounds dim  %room air  Abdominal:      General: There is distension.      Tenderness: There is abdominal tenderness.      Comments: Tender to  palpation   Genitourinary:     Comments: anuric  Musculoskeletal:      Comments: Ble without edema  Pain to lower back-lidocaine patches in place   Skin:     General: Skin is warm and dry.      Comments: Pruritus-pramoxine (Sarna Sensitive) 1 % lotion   Neurological:      Mental Status: He is alert and oriented to person, place, and time.   Psychiatric:         Mood and Affect: Mood normal.         Behavior: Behavior normal.         Last Recorded Vitals  Blood pressure 132/72, pulse 82, temperature 36.6 °C (97.9 °F), resp. rate 16, height 1.727 m (5' 8\"), weight 68 kg (150 lb), SpO2 100%.  Intake/Output last 3 Shifts:  I/O last 3 completed shifts:  In: 1000 (14.7 mL/kg) [I.V.:600 (8.8 mL/kg); Other:400]  Out: 3400 (50 mL/kg) [Other:3400]  Weight: 68 kg     Relevant Results  Scheduled medications  acetaminophen, 975 mg, oral, TID  amoxicillin-pot clavulanate, 1 tablet, oral, q24h ZOË  carvedilol, 37.5 mg, oral, BID  cinacalcet, 30 mg, oral, Daily  clotrimazole, , Topical, BID  epoetin aida or biosimilar, 10,000 Units, intravenous, Once per day on Tuesday Thursday Saturday  heparin (porcine), 5,000 Units, subcutaneous, q8h [facility]  insulin glargine, 10 Units, subcutaneous, q AM  insulin lispro, 0-5 Units, subcutaneous, TID AC  isavuconazonium sulfate, 372 mg, oral, Once   Followed by  [START ON 3/6/2025] " isavuconazonium sulfate, 372 mg, oral, Daily  isosorbide mononitrate ER, 60 mg, oral, Daily  lidocaine, 2 patch, transdermal, Daily  metoclopramide, 5 mg, oral, TID AC  neomycin-polymyxin-dexAMETHasone, 2 drop, Left Eye, q AM  NIFEdipine ER, 90 mg, oral, BID  pantoprazole, 40 mg, oral, Daily before breakfast  polyethylene glycol, 17 g, oral, Daily  pramoxine, , Topical, TID  pravastatin, 10 mg, oral, Nightly  predniSONE, 5 mg, oral, Daily  sevelamer carbonate, 800 mg, oral, TID AC  tacrolimus, 0.5 mg, oral, q12h ZOË  vitamin B complex-vitamin C-folic acid, 1 capsule, oral, Daily      Continuous medications     PRN medications  PRN medications: benzonatate, dextrose, dextrose, glucagon, glucagon, nitroglycerin, ondansetron, oxyCODONE, oxyCODONE, sodium chloride, tiZANidine   Results for orders placed or performed during the hospital encounter of 02/27/25 (from the past 24 hours)   POCT GLUCOSE   Result Value Ref Range    POCT Glucose 163 (H) 74 - 99 mg/dL   POCT GLUCOSE   Result Value Ref Range    POCT Glucose 211 (H) 74 - 99 mg/dL   Tacrolimus level   Result Value Ref Range    Tacrolimus  <2.0 <=15.0 ng/mL   CBC and Auto Differential   Result Value Ref Range    WBC 3.5 (L) 4.4 - 11.3 x10*3/uL    nRBC 0.0 0.0 - 0.0 /100 WBCs    RBC 3.47 (L) 4.50 - 5.90 x10*6/uL    Hemoglobin 9.5 (L) 13.5 - 17.5 g/dL    Hematocrit 29.9 (L) 41.0 - 52.0 %    MCV 86 80 - 100 fL    MCH 27.4 26.0 - 34.0 pg    MCHC 31.8 (L) 32.0 - 36.0 g/dL    RDW 15.3 (H) 11.5 - 14.5 %    Platelets 95 (L) 150 - 450 x10*3/uL    Neutrophils % 66.4 40.0 - 80.0 %    Immature Granulocytes %, Automated 0.6 0.0 - 0.9 %    Lymphocytes % 14.5 13.0 - 44.0 %    Monocytes % 15.9 2.0 - 10.0 %    Eosinophils % 2.3 0.0 - 6.0 %    Basophils % 0.3 0.0 - 2.0 %    Neutrophils Absolute 2.29 1.20 - 7.70 x10*3/uL    Immature Granulocytes Absolute, Automated 0.02 0.00 - 0.70 x10*3/uL    Lymphocytes Absolute 0.50 (L) 1.20 - 4.80 x10*3/uL    Monocytes Absolute 0.55 0.10 - 1.00  x10*3/uL    Eosinophils Absolute 0.08 0.00 - 0.70 x10*3/uL    Basophils Absolute 0.01 0.00 - 0.10 x10*3/uL   Renal function panel   Result Value Ref Range    Glucose 123 (H) 74 - 99 mg/dL    Sodium 136 136 - 145 mmol/L    Potassium 4.4 3.5 - 5.3 mmol/L    Chloride 96 (L) 98 - 107 mmol/L    Bicarbonate 31 21 - 32 mmol/L    Anion Gap 13 10 - 20 mmol/L    Urea Nitrogen 22 6 - 23 mg/dL    Creatinine 7.48 (H) 0.50 - 1.30 mg/dL    eGFR 7 (L) >60 mL/min/1.73m*2    Calcium 8.7 8.6 - 10.6 mg/dL    Phosphorus 2.9 2.5 - 4.9 mg/dL    Albumin 3.2 (L) 3.4 - 5.0 g/dL   Magnesium   Result Value Ref Range    Magnesium 2.09 1.60 - 2.40 mg/dL   POCT GLUCOSE   Result Value Ref Range    POCT Glucose 92 74 - 99 mg/dL   POCT GLUCOSE   Result Value Ref Range    POCT Glucose 92 74 - 99 mg/dL   POCT GLUCOSE   Result Value Ref Range    POCT Glucose 122 (H) 74 - 99 mg/dL     *Note: Due to a large number of results and/or encounters for the requested time period, some results have not been displayed. A complete set of results can be found in Results Review.                             Assessment/Plan   Assessment & Plan  Acute chest pain    Tolerated hemodialysis yesterday with net fluid loss 3L    Bp elevated with tx (152//106), euvolemic on exam and has stable electrolytes . K+=4..4     Outpatient Dialysis schedule:   TTS [facility]/Dr Bridges      Access: Lt graft +thrill/bruit- no issues - able to achieve      Anemia of ESRD:   epoetin aida-epbx (Retacrit) injection 10,000 Units  ... Current hgb 10.7.. will cont to monitor     CKD-MBD Phosphate Binder:cinacalcet (Sensipar) tablet 30 mg, daily, sevelamer carbonate (Renvela) tablet 800 mg  tid ac.. current phos level 2.9.. will cont to monitor, vitamin B complex-vitamin C-folic acid (Nephrocaps) capsule 1 capsule daily     Plan HD tomorrow with UF as tolerated     Renal diet      Please obtain daily standing wt (if possible)     Medication to be adjusted for ESRD      Patient to  continue regular HD schedule while inpatient and to follow with the outpatient nephrologist at discharge        Subsequent visit  I spent 35 minutes in the professional and overall care of this patient.      Juhi Vázquez, ANÍBAL-CNP

## 2025-03-05 NOTE — CONSULTS
Transplant Surgery Consult Note    Subjective   Chief Complaint/Reason for Consult: Transplant Nephrectomy    HPI:  69 y.o. male with ESRD attributed to hypertension since 1998 s/p kidney transplant #1 3/26/1992 that failed 11/13/2006), s/p kidney transplant #2 7/13/2013 which failed in May 2024 following which he has been on HD (TTS, [facility] via LUE AVG) . He also has known MGUS with IgG and IgA lambda (bone marrow bx 10/2023 with no plasma cell dyscrasia), DM2, HTN, prostate cancer s/p radical prostatectomy, baseline urinary incontinence, HCV s/p treatment (recent HCV PCR negative 7/2024). Patient admitted for chest pain, cough, and congestion. Currently being treated for atypical pneumonia with ID following. Transplant surgery consulted for possible transplant nephrectomy during this hospital admission.  Patient doing well overall. He feels that his chest pain and SOB have resolved since his admission. He was last dialyzed yesterday without any issues. He remains HDS on RA. Has not had any fevers or chills. Tolerating diet. Denies abdominal pain, diarrhea, or constipation. He complains of tenderness at the L DDKT side. Worse on palpation.    A 12-point ROS was performed and was unremarkable except as above.    PMH:  Past Medical History:   Diagnosis Date    Anemia     Arthritis     Cataract     Chronic kidney disease, stage 3 unspecified (Multi) 09/26/2018    Stage 3 chronic kidney disease    CKD (chronic kidney disease)     stage V    Cough 02/12/2024    COVID-19 06/18/2020    COVID-19 virus infection    Diabetes (Multi)     ESRD (end stage renal disease) (Multi)     Focal and segmental proliferative glomerulonephritis 12/23/2023    HTN (hypertension)     Hyperlipidemia     Other long term (current) drug therapy 07/20/2021    High risk medication use    Personal history of other diseases of the circulatory system     Personal history of cardiac murmur    Personal history of other infectious and parasitic  diseases 08/17/2015    History of hepatitis    Polyp, colonic 08/17/2023    Primary osteoarthritis of both ankles 08/17/2023    Prostate cancer (Multi)     Tubular adenoma of colon 08/17/2023    Unspecified kidney failure 08/17/2016    Renal failure     PSH:  Past Surgical History:   Procedure Laterality Date    ILEOSTOMY  04/25/2017    Ileostomy    ILEOSTOMY CLOSURE  08/17/2015    Ileostomy Closure    OTHER SURGICAL HISTORY  04/21/2017    Right Hemicolectomy    OTHER SURGICAL HISTORY  08/17/2015    Arteriovenous Surgery Creation Of A-V Fistula    OTHER SURGICAL HISTORY  08/17/2015    Sigmoidoscopy (Fiberoptic, Therapeutic )    PROSTATECTOMY  10/11/2013    Prostatectomy Radical    TRANSPLANT, KIDNEY, OPEN  1992    TRANSPLANT, KIDNEY, OPEN  2013    US GUIDED PERCUTANEOUS BIOPSY RENAL LEFT Left 11/20/2023    US GUIDED PERCUTANEOUS BIOPSY RENAL LEFT 11/20/2023 Lou Rodgers MD [facility]    US GUIDED PERCUTANEOUS PERITONEAL OR RETROPERITONEAL FLUID COLLECTION DRAINAGE  10/20/2022    US GUIDED PERCUTANEOUS PERITONEAL OR RETROPERITONEAL FLUID COLLECTION DRAINAGE 10/20/2022 [facility] CLINICAL LEGACY     Soc Hx:  Social History     Socioeconomic History    Marital status: Single     Spouse name: Not on file    Number of children: Not on file    Years of education: Not on file    Highest education level: Not on file   Occupational History    Not on file   Tobacco Use    Smoking status: Never     Passive exposure: Past    Smokeless tobacco: Never   Vaping Use    Vaping status: Never Used   Substance and Sexual Activity    Alcohol use: Never    Drug use: Not on file    Sexual activity: Defer   Other Topics Concern    Not on file   Social History Narrative    Not on file     Social Drivers of Health     Financial Resource Strain: Low Risk  (3/4/2025)    Overall Financial Resource Strain (CARDIA)     Difficulty of Paying Living Expenses: Not hard at all   Food Insecurity: No Food Insecurity (3/1/2025)    Hunger Vital Sign     Worried  About Running Out of Food in the Last Year: Never true     Ran Out of Food in the Last Year: Never true   Transportation Needs: No Transportation Needs (3/4/2025)    PRAPARE - Transportation     Lack of Transportation (Medical): No     Lack of Transportation (Non-Medical): No   Physical Activity: Sufficiently Active (1/5/2025)    Exercise Vital Sign     Days of Exercise per Week: 7 days     Minutes of Exercise per Session: 60 min   Stress: No Stress Concern Present (3/1/2025)    [employer] - Occupational Stress Questionnaire     Feeling of Stress : Not at all   Social Connections: Socially Isolated (1/5/2025)    Social Connection and Isolation Panel [NHANES]     Frequency of Communication with Friends and Family: More than three times a week     Frequency of Social Gatherings with Friends and Family: More than three times a week     Attends Christian Services: Never     Active Member of Clubs or Organizations: No     Attends Club or Organization Meetings: Never     Marital Status: Never    Intimate Partner Violence: Not At Risk (3/1/2025)    Humiliation, Afraid, Rape, and Kick questionnaire     Fear of Current or Ex-Partner: No     Emotionally Abused: No     Physically Abused: No     Sexually Abused: No   Housing Stability: Low Risk  (3/4/2025)    Housing Stability Vital Sign     Unable to Pay for Housing in the Last Year: No     Number of Times Moved in the Last Year: 0     Homeless in the Last Year: No   Recent Concern: Housing Stability - High Risk (3/1/2025)    Housing Stability Vital Sign     Unable to Pay for Housing in the Last Year: Yes     Number of Times Moved in the Last Year: 0     Homeless in the Last Year: No     Fam Hx:  Family History   Problem Relation Name Age of Onset    Bone cancer Mother      Other (corona's sarcome of the bone marrow) Mother      Prostate cancer Father      Diabetes Other Family Hist     Hypertension Other Family Hist       Allergies:  No  Known Allergies  Current Medications:  No current facility-administered medications on file prior to encounter.     Current Outpatient Medications on File Prior to Encounter   Medication Sig Dispense Refill    doxycycline (Adoxa) 100 mg tablet Take 1 tablet (100 mg) by mouth 2 times a day for 10 days. Take with a full glass of water and do not lie down for at least 30 minutes after 20 tablet 0    imiquimod (Aldara) 5 % cream Apply 1 packet topically 3 times a week. 12 packet 3    acetaminophen (Tylenol) 325 mg tablet Take 3 tablets (975 mg) by mouth every 6 hours if needed for mild pain (1 - 3).      calcitriol (Rocaltrol) 0.5 mcg capsule Take 1 capsule (0.5 mcg) by mouth once daily. 90 capsule 3    carvedilol (Coreg) 12.5 mg tablet Take 3 tablets (37.5 mg) by mouth 2 times a day. PATIENT NEEDS TO FOLLOW UP WITH CARDIOLOGY (Patient taking differently: Take 2 tablets (25 mg) by mouth 2 times a day. PATIENT NEEDS TO FOLLOW UP WITH CARDIOLOGY) 180 tablet 1    cinacalcet (Sensipar) 30 mg tablet Take 1 tablet (30 mg) by mouth once daily. 30 tablet 1    Easy Touch Alcohol Prep Pads pads, medicated       epoetin aida 10,000 unit/mL injection Inject 1 mL (10,000 Units) under the skin every 7 days. Do not start before April 17, 2024. (Patient not taking: Reported on 2/28/2025) 4 mL 11    glucagon (Gvoke HypoPen 1-Pack) 1 mg/0.2 mL auto-injector Inject 1 mg into the muscle every 15 minutes if needed (For blood glucose 41 to 70 mg/dL and no IV access). 0.2 mL 12    insulin glargine (Lantus) 100 unit/mL (3 mL) pen Inject 18 Units under the skin once daily in the morning. Inject 20 units daily as directed      insulin lispro (HumaLOG KwikPen Insulin) 100 unit/mL injection Inject 3 Units under the skin 3 times daily (morning, midday, late afternoon). 2 units with meals plus sliding scale up to 30 units daily 15 mL 0    isosorbide mononitrate ER (Imdur) 60 mg 24 hr tablet Take 1 tablet (60 mg) by mouth once daily. 90 tablet 3     "ketorolac (Acular) 0.5 % ophthalmic solution Instill 1 drop into right eye three times a day FOR USE AFTER CATARACT SURGERY (Patient not taking: Reported on 2025) 5 mL 1    ketorolac (Acular) 0.5 % ophthalmic solution Instill 1 drop into left eye three times a day FOR USE AFTER CATARACT SURGERY (Patient taking differently: Administer 1 drop into the left eye 3 times a day.) 5 mL 1    lancets (Unilet Super Thin Lancets) 30 gauge misc USE TO TEST BLOOD SUGAR THREE TIMES A  each 0    lancing device misc use as directed 1 each 0    [] levoFLOXacin (Levaquin) 250 mg tablet Take 1 tablet (250 mg) by mouth once daily for 5 days. 5 tablet 0    metoclopramide (Reglan) 5 mg tablet Take 1 tablet (5 mg) by mouth 3 times a day before meals. 90 tablet 1    neomycin-polymyxin-dexAMETHasone (Maxitrol) 3.5mg/mL-10,000 unit/mL-0.1 % ophthalmic suspension Instill 1 drop into right eye three times a day FOR USE AFTER CATARACT SURGERY (Patient not taking: Reported on 2025) 5 mL 1    neomycin-polymyxin-dexAMETHasone (Maxitrol) 3.5mg/mL-10,000 unit/mL-0.1 % ophthalmic suspension Instill 1 drop into left eye three times a day FOR USE AFTER CATARACT SURGERY (Patient taking differently: Administer 1 drop into the left eye 3 times a day.) 5 mL 1    NIFEdipine ER (Adalat CC) 90 mg 24 hr tablet Take 1 tablet (90 mg) by mouth 2 times a day. Do not crush, chew, or split. 60 tablet 0    pantoprazole (ProtoNix) 40 mg EC tablet Take 1 tablet (40 mg) by mouth once daily in the morning. Take before meals. Do not crush, chew, or split. Do not start before 2024. 30 tablet 11    pen needle, diabetic (TechLITE Pen Needle) 32 gauge x 5/32\" needle Use 4 a day as directed 400 each 3    pravastatin (Pravachol) 10 mg tablet Take 1 tablet (10 mg) by mouth once daily at bedtime. 90 tablet 1    predniSONE (Deltasone) 5 mg tablet Take by mouth once daily. Take 8 tablets (40 mg) by mouth once daily for 5 days, THEN 4 tablets (20 " "mg) once daily for 5 days, THEN 2 tablets (10 mg) once daily for 5 days, THEN 1 tablet (5 mg) once daily.      sevelamer carbonate (Renvela) 800 mg tablet Take 1 tablet (800 mg) by mouth 3 times a day before meals. Swallow tablet whole; do not crush, break, or chew. 90 tablet 0    syringe with needle 3 mL 25 x 5/8\" syringe Use to inject epogen. 7 each 0    tacrolimus (Prograf) 0.5 mg capsule Take 1 capsule (0.5 mg) by mouth 2 times a day. 60 capsule 11    tacrolimus (Protopic) 0.1 % ointment Apply topically 2 times a day. (Patient not taking: Reported on 2/28/2025) 60 g 3    Unilet Super Thin Lancets 30 gauge misc 1 each 3 times a day. 100 each 3    vitamin B complex-vitamin C-folic acid (Nephrocaps) 1 mg capsule Take 1 capsule by mouth once daily. 30 capsule 11         Objective   Vitals:  Visit Vitals  /72 (Patient Position: Standing)   Pulse 82   Temp 36.6 °C (97.9 °F)   Resp 16       Physical Exam:  GEN: No acute distress. Alert, awake and conversant.  HEENT: Sclera anicteric. Moist mucous membranes.  RESP: Breathing non-labored, equal chest rise. On RA.  CV: Regular rate, normotensive  GI: Abdomen soft, nondistended, tender to palpation LLQ. Palpable transplant kidney LLQ tender to touch (localized). Well healed surgical scars from previous abdominal surgery  MSK: No gross deformities. Moves all extremities spontaneously.  NEURO: Alert and oriented x3. No focal deficits.  PSYCH: Appropriate mood and affect.  SKIN: No rashes or lesions.    Labs within past 24h:  Results for orders placed or performed during the hospital encounter of 02/27/25 (from the past 24 hours)   POCT GLUCOSE   Result Value Ref Range    POCT Glucose 163 (H) 74 - 99 mg/dL   POCT GLUCOSE   Result Value Ref Range    POCT Glucose 211 (H) 74 - 99 mg/dL   Tacrolimus level   Result Value Ref Range    Tacrolimus  <2.0 <=15.0 ng/mL   CBC and Auto Differential   Result Value Ref Range    WBC 3.5 (L) 4.4 - 11.3 x10*3/uL    nRBC 0.0 0.0 - 0.0 " /100 WBCs    RBC 3.47 (L) 4.50 - 5.90 x10*6/uL    Hemoglobin 9.5 (L) 13.5 - 17.5 g/dL    Hematocrit 29.9 (L) 41.0 - 52.0 %    MCV 86 80 - 100 fL    MCH 27.4 26.0 - 34.0 pg    MCHC 31.8 (L) 32.0 - 36.0 g/dL    RDW 15.3 (H) 11.5 - 14.5 %    Platelets 95 (L) 150 - 450 x10*3/uL    Neutrophils % 66.4 40.0 - 80.0 %    Immature Granulocytes %, Automated 0.6 0.0 - 0.9 %    Lymphocytes % 14.5 13.0 - 44.0 %    Monocytes % 15.9 2.0 - 10.0 %    Eosinophils % 2.3 0.0 - 6.0 %    Basophils % 0.3 0.0 - 2.0 %    Neutrophils Absolute 2.29 1.20 - 7.70 x10*3/uL    Immature Granulocytes Absolute, Automated 0.02 0.00 - 0.70 x10*3/uL    Lymphocytes Absolute 0.50 (L) 1.20 - 4.80 x10*3/uL    Monocytes Absolute 0.55 0.10 - 1.00 x10*3/uL    Eosinophils Absolute 0.08 0.00 - 0.70 x10*3/uL    Basophils Absolute 0.01 0.00 - 0.10 x10*3/uL   Renal function panel   Result Value Ref Range    Glucose 123 (H) 74 - 99 mg/dL    Sodium 136 136 - 145 mmol/L    Potassium 4.4 3.5 - 5.3 mmol/L    Chloride 96 (L) 98 - 107 mmol/L    Bicarbonate 31 21 - 32 mmol/L    Anion Gap 13 10 - 20 mmol/L    Urea Nitrogen 22 6 - 23 mg/dL    Creatinine 7.48 (H) 0.50 - 1.30 mg/dL    eGFR 7 (L) >60 mL/min/1.73m*2    Calcium 8.7 8.6 - 10.6 mg/dL    Phosphorus 2.9 2.5 - 4.9 mg/dL    Albumin 3.2 (L) 3.4 - 5.0 g/dL   Magnesium   Result Value Ref Range    Magnesium 2.09 1.60 - 2.40 mg/dL   POCT GLUCOSE   Result Value Ref Range    POCT Glucose 92 74 - 99 mg/dL   POCT GLUCOSE   Result Value Ref Range    POCT Glucose 92 74 - 99 mg/dL   POCT GLUCOSE   Result Value Ref Range    POCT Glucose 122 (H) 74 - 99 mg/dL     *Note: Due to a large number of results and/or encounters for the requested time period, some results have not been displayed. A complete set of results can be found in Results Review.       Imaging within past 24h:  CT head wo IV contrast    Result Date: 3/5/2025  Interpreted By:  Harvey Calderón and Stevens Alex STUDY: CT HEAD WO IV CONTRAST;  3/5/2025 11:35 am    INDICATION: Signs/Symptoms:change in vision.   COMPARISON: CT head 09/13/2023.   ACCESSION NUMBER(S): [id number]   ORDERING CLINICIAN: DEBBIE MARTÍNEZ   TECHNIQUE: Axial noncontrast CT images of the head. Sagittal and coronal reformats were provided.   FINDINGS: BRAIN: No acute intracranial hemorrhage. No mass effect or midline shift. Gray-white matter interfaces are preserved.Subtle white matter hypodensities which are nonspecific but likely related to microangiopathic white matter changes.   VENTRICLES and EXTRA-AXIAL SPACES: Normal.   EXTRACRANIAL SOFT TISSUES:  Within normal limits.   PARANASAL SINUSES/MASTOIDS: The visualized paranasal sinuses and mastoid air cells are aerated.   BONES AND ORBITS: No displaced skull fracture. No suspicious osseous lesion.  Orbits are within normal limits.   OTHER FINDINGS: None.       1. There is no evidence of acute hemorrhage, mass lesion or acute infarction.   I personally reviewed the images/study and I agree with the findings as stated by Chavez Lucas DO PGY-2. This study was interpreted at [place], [place].   MACRO: None   Signed by: Harvey Calderón 3/5/2025 11:52 AM Dictation workstation:   [id number]    CT chest wo IV contrast    Result Date: 3/4/2025  Interpreted By:  Melina Hernandez and Elsamaloty Mazzin STUDY: CT CHEST WO IV CONTRAST;  3/4/2025 1:09 pm   INDICATION: Signs/Symptoms:cough, fluid status.     COMPARISON: CT CHEST ABDOMEN PELVIS WO CONTRAST 2/24/2025, CT CARDIAC SCORING WO IV CONTRAST 9/23/2024, CT ANGIO CHEST ABDOMEN PELVIS 5/11/2024   ACCESSION NUMBER(S): [id number]   ORDERING CLINICIAN: DEBBIE MARTÍNEZ   TECHNIQUE: Helical data acquisition of the chest was obtained  without IV contrast material.  Images were reformatted in axial, coronal, and sagittal planes.   FINDINGS: LUNGS AND AIRWAYS: The trachea and central airways are patent. No endobronchial lesion.   There is a similar appearance  of pleural-based nodules such as in the right lung apex which measures 0.5 cm (Series 203, Image 86) and along the minor fissure measuring 0.4 cm (Series 203, Image 154). Similar appearance of a sub 0.3 cm calcified nodule in the left lung base as well (Series 203, Image 174). There is resolution of the previously noted left-sided pleural effusion and significant improvement in the right-sided pleural effusion.   Significant improvement in aeration of the lungs with few ground-glass opacity seen, suggesting improvement in pulmonary edema. There is still persistent amount of ground-glass opacities predominantly in the basilar segments.   MEDIASTINUM AND [place]IS, LOWER NECK AND AXILLA: The visualized thyroid gland is within normal limits.   There is prominent bilateral axillary lymphadenopathy, the largest of which measures 1.1 cm in the left axilla (Series 201, Image 70), it has a similar appearance dating back to comparison examination dated 05/11/2024.   Esophagus appears within normal limits as seen.   HEART AND VESSELS: Redemonstration of similar-appearing mildly dilated ascending thoracic aorta measuring 3.9 cm in diameter.   The main pulmonary artery is mildly dilated measures up to 3.5 cm in diameter.   Mild coronary artery calcifications are seen. The study is not optimized for evaluation of coronary arteries. Left subclavian vascular stent noted. Similar appearing cardiomegaly.   No evidence of pericardial effusion.   UPPER ABDOMEN: Calcifications of the splenic artery are noted.   CHEST WALL AND OSSEOUS STRUCTURES: Multilevel degenerative changes are present, pronounced at the T10-T11 level with loss of disc space, subchondral sclerosis, and Schmorl's node formation. There is a chronic appearing fracture/deformity of the right coracoid process which is seen as far back as comparison examination dated 05/11/2024.       1. Improvement in pulmonary edema, resolution of left-sided pleural effusion, and significant  improvement in right-sided pleural effusion. 2. The main pulmonary artery is mildly dilated measures up to 3.5 cm in diameter which can be seen in setting of pulmonary arterial hypertension among other considerations. 3. Similar appearing cardiomegaly. Stable mildly dilated ascending thoracic aorta measuring 3.9 cm. 4. Chronic appearing fracture/deformity of the right coracoid process. 5. Other chronic findings as above.     I personally reviewed the images/study and I agree with the findings as stated by Laurie Pan MD (resident).   MACRO: None   Signed by: Melina Hernandez 3/4/2025 6:15 PM Dictation workstation:   [id number]     I have reviewed the imaging above as it pertains to the patient's surgical concerns and agree with the radiologist's interpretation.     ASSESSMENT  69 y.o. male with ESRD attributed to hypertension since 1998 s/p kidney transplant #1 3/26/1992 that failed 11/13/2006), s/p kidney transplant #2 7/13/2013 which failed in May 2024 following which he has been on HD (TTS, [facility] via LUE AVG) . He also has known MGUS with IgG and IgA lambda (bone marrow bx 10/2023 with no plasma cell dyscrasia), DM2, HTN, prostate cancer s/p radical prostatectomy, baseline urinary incontinence, HCV s/p treatment (recent HCV PCR negative 7/2024). Patient admitted for chest pain, cough, and congestion. Currently being treated for atypical pneumonia with ID following. Transplant surgery consulted for possible transplant nephrectomy during this hospital admission.    PLAN:  - patient will benefit from transplant nephrectomy given ongoing abdominal pain at the kidney site. Timing of nephrectomy (inpatient vs outpatient) pending ongoing discussion. If patient remains HDS stable on room air given ongoing pneumonia, may consider plan to schedule nephrectomy during this admission  - Transplant surgery to follow  - rest of care per primary  - please call with any questions or concerns    Discussed with   Marco.    Jacquelyn Gomez MD  PGY-3 General Surgery  Transplant Surgery [phone]

## 2025-03-05 NOTE — PROGRESS NOTES
"Manolo Bashir is a 69 y.o. male on day 6 of admission presenting with Acute chest pain.    Feels better. Denies dyspnea.  Denies pain at the kidney allograft unless pressed.    Scheduled medications  acetaminophen, 975 mg, oral, TID  amoxicillin-pot clavulanate, 1 tablet, oral, q24h [facility], [facility]  carvedilol, 37.5 mg, oral, BID  cinacalcet, 30 mg, oral, Daily  clotrimazole, , Topical, BID  epoetin aida or biosimilar, 10,000 Units, intravenous, Once per day on Tuesday Thursday Saturday  heparin (porcine), 5,000 Units, subcutaneous, q8h ZOË  insulin glargine, 10 Units, subcutaneous, q AM  insulin lispro, 0-5 Units, subcutaneous, TID AC  isavuconazonium sulfate, 372 mg, oral, Once   Followed by  [START ON 3/6/2025] isavuconazonium sulfate, 372 mg, oral, Daily  isosorbide mononitrate ER, 60 mg, oral, Daily  lidocaine, 2 patch, transdermal, Daily  metoclopramide, 5 mg, oral, TID AC  neomycin-polymyxin-dexAMETHasone, 2 drop, Left Eye, q AM  NIFEdipine ER, 90 mg, oral, BID  pantoprazole, 40 mg, oral, Daily before breakfast  polyethylene glycol, 17 g, oral, Daily  pramoxine, , Topical, TID  pravastatin, 10 mg, oral, Nightly  predniSONE, 5 mg, oral, Daily  sevelamer carbonate, 800 mg, oral, TID AC  tacrolimus, 0.5 mg, oral, q12h [facility], [facility]  vitamin B complex-vitamin C-folic acid, 1 capsule, oral, Daily      Continuous medications     PRN medications  PRN medications: benzonatate, dextrose, dextrose, glucagon, glucagon, nitroglycerin, ondansetron, oxyCODONE, oxyCODONE, sodium chloride, tiZANidine    Last Recorded Vitals  Blood pressure 132/72, pulse 82, temperature 36.6 °C (97.9 °F), resp. rate 16, height 1.727 m (5' 8\"), weight 68 kg (150 lb), SpO2 100%.  Intake/Output last 3 Shifts:  I/O last 3 completed shifts:  In: 1000 (14.7 mL/kg) [I.V.:600 (8.8 mL/kg); Other:400]  Out: 3400 (50 mL/kg) [Other:3400]  Weight: 68 kg     A&ox3, no distress, pleasant  MMM, no lesions  Lungs with equal air entry, no added sounds  Rrr, no m/r/g  Abd soft, nt, " nd  No allograft tenderness  No edema b/l    Results for orders placed or performed during the hospital encounter of 02/27/25 (from the past 24 hours)   POCT GLUCOSE   Result Value Ref Range    POCT Glucose 143 (H) 74 - 99 mg/dL   POCT GLUCOSE   Result Value Ref Range    POCT Glucose 163 (H) 74 - 99 mg/dL   POCT GLUCOSE   Result Value Ref Range    POCT Glucose 211 (H) 74 - 99 mg/dL   Tacrolimus level   Result Value Ref Range    Tacrolimus  <2.0 <=15.0 ng/mL   CBC and Auto Differential   Result Value Ref Range    WBC 3.5 (L) 4.4 - 11.3 x10*3/uL    nRBC 0.0 0.0 - 0.0 /100 WBCs    RBC 3.47 (L) 4.50 - 5.90 x10*6/uL    Hemoglobin 9.5 (L) 13.5 - 17.5 g/dL    Hematocrit 29.9 (L) 41.0 - 52.0 %    MCV 86 80 - 100 fL    MCH 27.4 26.0 - 34.0 pg    MCHC 31.8 (L) 32.0 - 36.0 g/dL    RDW 15.3 (H) 11.5 - 14.5 %    Platelets 95 (L) 150 - 450 x10*3/uL    Neutrophils % 66.4 40.0 - 80.0 %    Immature Granulocytes %, Automated 0.6 0.0 - 0.9 %    Lymphocytes % 14.5 13.0 - 44.0 %    Monocytes % 15.9 2.0 - 10.0 %    Eosinophils % 2.3 0.0 - 6.0 %    Basophils % 0.3 0.0 - 2.0 %    Neutrophils Absolute 2.29 1.20 - 7.70 x10*3/uL    Immature Granulocytes Absolute, Automated 0.02 0.00 - 0.70 x10*3/uL    Lymphocytes Absolute 0.50 (L) 1.20 - 4.80 x10*3/uL    Monocytes Absolute 0.55 0.10 - 1.00 x10*3/uL    Eosinophils Absolute 0.08 0.00 - 0.70 x10*3/uL    Basophils Absolute 0.01 0.00 - 0.10 x10*3/uL   Renal function panel   Result Value Ref Range    Glucose 123 (H) 74 - 99 mg/dL    Sodium 136 136 - 145 mmol/L    Potassium 4.4 3.5 - 5.3 mmol/L    Chloride 96 (L) 98 - 107 mmol/L    Bicarbonate 31 21 - 32 mmol/L    Anion Gap 13 10 - 20 mmol/L    Urea Nitrogen 22 6 - 23 mg/dL    Creatinine 7.48 (H) 0.50 - 1.30 mg/dL    eGFR 7 (L) >60 mL/min/1.73m*2    Calcium 8.7 8.6 - 10.6 mg/dL    Phosphorus 2.9 2.5 - 4.9 mg/dL    Albumin 3.2 (L) 3.4 - 5.0 g/dL   Magnesium   Result Value Ref Range    Magnesium 2.09 1.60 - 2.40 mg/dL   POCT GLUCOSE   Result Value Ref  Range    POCT Glucose 92 74 - 99 mg/dL   POCT GLUCOSE   Result Value Ref Range    POCT Glucose 92 74 - 99 mg/dL     *Note: Due to a large number of results and/or encounters for the requested time period, some results have not been displayed. A complete set of results can be found in Results Review.           Assessment/Plan     69 y.o. male with ESRD attributed to hypertension since 1998 s/p kidney transplant #1 3/26/1992 that failed 11/13/2006), s/p kidney transplant #2 7/13/2013 which failed in May 2024 following which he has been on HD (TTS, [facility] via LUE AVG) . He also has known MGUS with IgG and IgA lambda (bone marrow bx 10/2023 with no plasma cell dyscrasia), DM2, HTN, prostate cancer s/p radical prostatectomy, baseline urinary incontinence, HCV s/p treatment (recent HCV PCR negative 7/2024).       Recent admissions for graft intolerance s/p steroid pulse with improvement in symptoms, C diff colitis. Being evaluated for graft nephrectomy. Started on empiric PO abx (levo + doxy) for suspected pneumonia.      Admitted with chest pain, cough, congestion. ACS ruled out. Txp neph consulted for further eval and management, possible nephrectomy this admission.     ESRD s/p failed kidney x2, on HD since 5/2024  - HD TTS via LUE AVG.   - RRT management per ESRD service. Optimize UF as tolerated.      Anemia/WBC:   Mild pancytopenia - stable     Immunosuppression:   cont low dose tac 0.5mg bid, pred 5mg/d.     ID:  Repeat CT chest 3/4: overall better  Per ID: Augmentin 500 mg/day x 6 weeks + isavuconazole loading and maintenance for 3 months    Discussed with transplant surgery. He will be evaluated for transplant nephrectomy during this admission for graft intolerance syndrome/chronic rejection    Will follow peripherally. Pls call with any questions or concerns      Betty Ireland MD

## 2025-03-05 NOTE — DOCUMENTATION CLARIFICATION NOTE
"    PATIENT:               KELVIN ROB  ACCT #:                  [id number]  MRN:                       [id number]  :                       1955  ADMIT DATE:       2025 6:41 AM  DISCH DATE:  RESPONDING PROVIDER #:        [id number]          PROVIDER RESPONSE TEXT:    Chest pain due to acute muscle strain secondary to coughing    CDI QUERY TEXT:    Clarification    Instruction:    Based on your assessment of the patient and the clinical information, please provide the requested documentation by clicking on the appropriate radio button and enter any additional information if prompted.    Question: Please further clarify the most likely etiology of chest pain on admission after final work up    When answering this query, please exercise your independent professional judgment. The fact that a question is being asked, does not imply that any particular answer is desired or expected.    The patient's clinical indicators include:  Clinical Information: 3/2 IM PN indicates 69 YOM presented with acute chest pain; PMH s/f failed kidney transplants on immunosuppression, ESRD on HD, HTN, HFpEF.    Clinical Indicators:  HP: felt sudden sharp chest pain when getting ready to leave for dialysis.  \"On Empiric antibiotics, (started )\"    3/1 ID consult \"sudden onset sharp chest pain, associated with productive cough\"    3/2 Transplant PN: during recent admission pt \"Started on empiric PO abx (levo and doxy) for suspected pneumonia\"    3/2 IM PN \"Chest Pain,  Edema vs Resp Infection, improving\"  \"HFpEF, diastolic dysfunction\"  \"Cough, improving\"    3/2 PN attestation \"Chest pain, (question corby)  Volume overload versus infection\"    3/3 Nephrology PN \"Acute chest pain\"  \"Tolerated hemodialysis Saturday with net fluid loss 2L\"    3/3 Pulmonology PN \"Pulmonary edema\"  \"Transudative pleural effusion\"  \"Review of recent imaging showed an improvement I'm his right sided pleural effusion with dialysis and without " "thoracentesis\"  \"CT scan appearance and the change of his chest imaging in response to dialysis are most consistent with pulmonary edema with bilateral pleural effusion (right > left) from his renal and cardiac disease\"  \"will defer thoracentesis for now\"  \"defer bronchoscopy with BAL for now, due to the low likelihood for infection\"    2/27 BNP 1042  2/28 CRP 1.88    Treatment: Consult ID, nephology, and pulmonology. Hemodialysis 2/27, 2/28, 3/1. Empiric ATBs. Tessalon perles.    Risk Factors: Recent hospitalizations. Recent treatment for PNA. HFpEF. HTN. ESRD on HD. Failed kidney transplants w/plan for nephrectomy.  Options provided:  -- Chest pain due to acute noncardiogenic pulmonary edema secondary to ESRD  -- Chest pain due to acute cardiogenic pulmonary edema secondary to acute on chronic HFpEF  -- Chest pain due to acute muscle strain secondary to coughing  -- Chest pain due to pneumonia  -- Other - I will add my own diagnosis  -- Refer to Clinical Documentation Reviewer    Query created by: Evie Goss on 3/3/2025 6:20 PM      Electronically signed by:  LOVE THOMAS MD 3/5/2025 12:29 PM          "

## 2025-03-05 NOTE — PROGRESS NOTES
Physical Therapy    Physical Therapy Evaluation    Patient Name: Manolo Bashir  MRN: [id number]  Department: [place] [address]  Room: [address] [place] [address]  Today's Date: 3/5/2025   Time Calculation  Start Time: 0835  Stop Time: 0852  Time Calculation (min): 17 min    Assessment/Plan   PT Assessment  PT Assessment Results:  (chronic pain in ankles with stairclimbing; otherwise no PT impairment level issues negatively impacting mobility)  Rehab Prognosis: Excellent  Barriers to Discharge: none noted  Barriers to Discharge Home: No anticipated barriers  Evaluation/Treatment Tolerance: Patient tolerated treatment well  Medical Staff Made Aware: Yes  Strengths: Ability to acquire knowledge, Attitude of self, Coping skills, Premorbid level of function, Support of Caregivers  Barriers to Participation: Other (Comment) (none)  End of Session Communication: Bedside nurse, Physician  Assessment Comment: 70 yo male presents independently ambulating no device, no current PT needs nor PT equipment needs.  End of Session Patient Position: Up in chair, Alarm off, not on at start of session  IP OR SWING BED PT PLAN  Inpatient or Swing Bed: Inpatient  PT Plan  Treatment/Interventions:  (N/A (eval only, pt independently mobilizing safely))  PT Plan: PT Eval only  PT Eval Only Reason: No acute PT needs identified  PT Frequency: PT eval only  PT Discharge Recommendations: Other (comment) (home with intermittent assist as premorbid, no PT needs)  Equipment Recommended upon Discharge:  (no PT equipment needs)  PT Recommended Transfer Status: Independent (no device)  PT - OK to Discharge: Yes (PT eval completed & DC recs made)    Subjective   General Visit Information:  General  Reason for Referral: Admitted 2/24 with allograft pain/hematuria, chest pain; dx: chest pain (volume overload vs. Infection), HTN  Past Medical History Relevant to Rehab: head lice 3/23/24, ESRD on HD now s/p 2 time renal transplant (1992, 2013), CKD 3, IgG and IgA lambda MGUS, DM2,  HTN, prostate cancer s/p radical prostatectomy, baseline urinary incontinence, HCV, anemia, OA, cataracts, COVID, adenocarcinoma of prostate, chronic anemia, chronic Hep C, GERD, goiter, MGUS, MRSA, PNA  PT Missed Visit:  (no)  Missed Visit Reason:  (.)  Family/Caregiver Present: No  Caregiver Feedback: N/A  Prior to Session Communication: Bedside nurse  Patient Position Received: Bed, 2 rail up, Alarm off, not on at start of session  Preferred Learning Style: verbal  General Comment: Pt alert and engaged, able to walk and do stairs as noted, no current PT needs. Pt reports hx of ankle fxs/issues (slow/antalgic on stairs), reports no back pain today.  Home Living:  Home Living  Type of Home: House  Lives With:  (daughter (works from home and can assist as needed))  Home Adaptive Equipment:  (WW, cane, raised toilet, shower equipment as noted)  Home Layout: Multi-level (6 JAYLIN with 1 rails (far apart), bed/tub shower up 6+6 steps with 1 rail; laundry down flight of stairs with 1 rail (he does it sometimes))  Alternate Level Stairs-Number of Steps: 6+6 with 1 rail  Home Access: Stairs to enter with rails  Entrance Stairs-Rails: Both (far apart)  Entrance Stairs-Number of Steps: 6  Bathroom Shower/Tub: Tub/shower unit  Bathroom Toilet: Handicapped height  Bathroom Equipment: Shower chair with back (old tub)  Bathroom Accessibility: up 6+6 with 1 rail  Prior Level of Function:  Prior Function Per Pt/Caregiver Report  Level of [place]:  (independent in/outdoor ambulation no device, independent stairclimbing, no falls)  Receives Help From:  (dtr as needed)  ADL Assistance: Independent  Homemaking Assistance: Independent (shared laundry/cook/clean)  Ambulatory Assistance: Independent  Vocational: On disability (was a )  Hand Dominance: Right  Prior Function Comments: drives, no falls  Precautions:  Precautions  Medical Precautions: Fall precautions (Left AV fistula, DM, anemia)      Date/Time Vitals Session  Patient Position Pulse Resp SpO2 BP MAP (mmHg)    03/05/25 0835 Post PT  Sitting  74  --  97 %  151/72  --           Vital Signs Comment: post gait/stairs     Objective   Pain:  Pain Assessment  Pain Assessment: 0-10  0-10 (Numeric) Pain Score:  (no pain pre/post; ankle discomfort bilaterally on stairs (unrated; hx of fxs))  Pain Interventions: Ambulation/increased activity, Rest, Repositioned  Response to Interventions:  (comfortable no pain at end of session)  Cognition:  Cognition  Overall Cognitive Status: Within Functional Limits  Orientation Level: Oriented X4  Processing Speed: Within funtional limits    General Assessments:     Activity Tolerance  Endurance: Endurance does not limit participation in activity    Sensation  Light Touch: Not tested (reports numbness in fingers)    Static Sitting Balance  Static Sitting-Balance Support: Feet supported, Bilateral upper extremity supported  Static Sitting-Level of Assistance: Independent  Dynamic Sitting Balance  Dynamic Sitting-Balance Support: Feet supported, No upper extremity supported  Dynamic Sitting-Level of Assistance: Independent  Dynamic Sitting-Balance: Reaching for objects    Static Standing Balance  Static Standing-Balance Support: No upper extremity supported (no device)  Static Standing-Level of Assistance: Independent  Dynamic Standing Balance  Dynamic Standing-Balance Support: No upper extremity supported (no device)  Dynamic Standing-Level of Assistance: Close supervision (for stairs, independent with gait as noted)  Functional Assessments:  ADL  ADL's Addressed: Yes (pt independent donned new socks and extra gown)    Bed Mobility  Bed Mobility: No (pt sitting EOB upon arrival, in chair at end)    Transfers  Transfer: Yes  Transfer 1  Transfer From 1: Sit to, Stand to  Transfer to 1: Sit, Stand  Technique 1: Sit to stand, Stand to sit  Transfer Device 1:  (no device)  Transfer Level of Assistance 1: Independent  Transfers 2  Transfer From 2:  Stand to  Transfer to 2: Chair with arms  Technique 2: Stand pivot, Stand to sit  Transfer Device 2:  (no device)  Transfer Level of Assistance 2: Close supervision    Ambulation/Gait Training  Ambulation/Gait Training Performed: Yes  Ambulation/Gait Training 1  Surface 1: Level tile  Device 1: No device  Assistance 1: Modified independent  Quality of Gait 1:  (slightly decreased adeline, smaller steps)  Comments/Distance (ft) 1: 120    Stairs  Stairs: Yes (up/down 6 steps with 1 rail with supervision, no LOB (reciprocating on ascent, 1 step at a time with descend leading with Right LE, reports hx of ankle fxs))  Extremity/Trunk Assessments:  RUE   RUE : Within Functional Limits (AROM: grasp, elbow flex/ext and shoulder elevation WFL; strength: grasp 5, elbow flex/ext and shoulder elevation 4+)  LUE   LUE: Within Functional Limits (AROM: grasp, elbow flex/ext and shoulder elevation WFL; strength: grasp 5, elbow flex/ext and shoulder elevation 4+)  RLE   RLE : Within Functional Limits (ankle DF/PF and knee flex/ext ROM WFL; strength grossly: ankle DF 4+, knee ext 5)  LLE   LLE : Within Functional Limits (ankle DF/PF and knee flex/ext ROM WFL; strength grossly: ankle DF 4+, knee ext 5)  Outcome Measures:  [facility] Basic Mobility  Turning from your back to your side while in a flat bed without using bedrails: None  Moving from lying on your back to sitting on the side of a flat bed without using bedrails: None  Moving to and from bed to chair (including a wheelchair): None  Standing up from a chair using your arms (e.g. wheelchair or bedside chair): None  To walk in hospital room: None  Climbing 3-5 steps with railing: A little  Basic Mobility - Total Score: 23        Education Documentation  Precautions, taught by Chasidy Gunderson PT at 3/5/2025  9:00 AM.  Learner: Patient  Readiness: Eager  Method: Explanation, Demonstration  Response: Verbalizes Understanding, Demonstrated Understanding  Comment: PT purpose/no further  PT needs, gait and stairclimbing, vitals    Body Mechanics, taught by Chasidy Gunderson, PT at 3/5/2025  9:00 AM.  Learner: Patient  Readiness: Eager  Method: Explanation, Demonstration  Response: Verbalizes Understanding, Demonstrated Understanding  Comment: PT purpose/no further PT needs, gait and stairclimbing, vitals    Mobility Training, taught by Chasidy Gunderson, PT at 3/5/2025  9:00 AM.  Learner: Patient  Readiness: Eager  Method: Explanation, Demonstration  Response: Verbalizes Understanding, Demonstrated Understanding  Comment: PT purpose/no further PT needs, gait and stairclimbing, vitals    Education Comments  No comments found.

## 2025-03-05 NOTE — CARE PLAN
Problem: Safety - Adult  Goal: Free from fall injury  Outcome: Progressing     Problem: Pain  Goal: Takes deep breaths with improved pain control throughout the shift  Outcome: Progressing   The patient's goals for the shift include      The clinical goals for the shift include Pt will remain safe and free from falls

## 2025-03-06 ENCOUNTER — PHARMACY VISIT (OUTPATIENT)
Dept: PHARMACY | Facility: CLINIC | Age: 70
End: 2025-03-06
Payer: COMMERCIAL

## 2025-03-06 ENCOUNTER — APPOINTMENT (OUTPATIENT)
Dept: DIALYSIS | Facility: HOSPITAL | Age: 70
End: 2025-03-06
Payer: MEDICARE

## 2025-03-06 VITALS
SYSTOLIC BLOOD PRESSURE: 154 MMHG | WEIGHT: 150 LBS | OXYGEN SATURATION: 100 % | HEIGHT: 68 IN | BODY MASS INDEX: 22.73 KG/M2 | RESPIRATION RATE: 16 BRPM | DIASTOLIC BLOOD PRESSURE: 66 MMHG | HEART RATE: 66 BPM | TEMPERATURE: 97.7 F

## 2025-03-06 LAB
ABO GROUP (TYPE) IN BLOOD: NORMAL
ALBUMIN SERPL BCP-MCNC: 2.9 G/DL (ref 3.4–5)
ANION GAP SERPL CALC-SCNC: 17 MMOL/L (ref 10–20)
ANTIBODY SCREEN: NORMAL
ATRIAL RATE: 63 BPM
B DERMAT AB TITR SER: NEGATIVE {TITER}
BASOPHILS # BLD AUTO: 0.01 X10*3/UL (ref 0–0.1)
BASOPHILS NFR BLD AUTO: 0.3 %
BUN SERPL-MCNC: 29 MG/DL (ref 6–23)
CALCIUM SERPL-MCNC: 8.1 MG/DL (ref 8.6–10.6)
CHLORIDE SERPL-SCNC: 96 MMOL/L (ref 98–107)
CO2 SERPL-SCNC: 27 MMOL/L (ref 21–32)
CREAT SERPL-MCNC: 9.58 MG/DL (ref 0.5–1.3)
EGFRCR SERPLBLD CKD-EPI 2021: 5 ML/MIN/1.73M*2
EOSINOPHIL # BLD AUTO: 0.2 X10*3/UL (ref 0–0.7)
EOSINOPHIL NFR BLD AUTO: 6 %
ERYTHROCYTE [DISTWIDTH] IN BLOOD BY AUTOMATED COUNT: 15.4 % (ref 11.5–14.5)
GLUCOSE BLD MANUAL STRIP-MCNC: 88 MG/DL (ref 74–99)
GLUCOSE BLD MANUAL STRIP-MCNC: 89 MG/DL (ref 74–99)
GLUCOSE SERPL-MCNC: 230 MG/DL (ref 74–99)
HCT VFR BLD AUTO: 27.5 % (ref 41–52)
HGB BLD-MCNC: 8.7 G/DL (ref 13.5–17.5)
IMM GRANULOCYTES # BLD AUTO: 0.02 X10*3/UL (ref 0–0.7)
IMM GRANULOCYTES NFR BLD AUTO: 0.6 % (ref 0–0.9)
LYMPHOCYTES # BLD AUTO: 0.59 X10*3/UL (ref 1.2–4.8)
LYMPHOCYTES NFR BLD AUTO: 17.7 %
MAGNESIUM SERPL-MCNC: 2.05 MG/DL (ref 1.6–2.4)
MCH RBC QN AUTO: 27.8 PG (ref 26–34)
MCHC RBC AUTO-ENTMCNC: 31.6 G/DL (ref 32–36)
MCV RBC AUTO: 88 FL (ref 80–100)
MONOCYTES # BLD AUTO: 0.61 X10*3/UL (ref 0.1–1)
MONOCYTES NFR BLD AUTO: 18.3 %
NEUTROPHILS # BLD AUTO: 1.91 X10*3/UL (ref 1.2–7.7)
NEUTROPHILS NFR BLD AUTO: 57.1 %
NIL(NEG) CONTROL SPOT COUNT: NORMAL
NRBC BLD-RTO: 0 /100 WBCS (ref 0–0)
P AXIS: 75 DEGREES
P OFFSET: 160 MS
P ONSET: 123 MS
PANEL A SPOT COUNT: 1
PANEL B SPOT COUNT: 0
PHOSPHATE SERPL-MCNC: 3.5 MG/DL (ref 2.5–4.9)
PLATELET # BLD AUTO: 119 X10*3/UL (ref 150–450)
POS CONTROL SPOT COUNT: NORMAL
POTASSIUM SERPL-SCNC: 4.6 MMOL/L (ref 3.5–5.3)
PR INTERVAL: 184 MS
Q ONSET: 215 MS
QRS COUNT: 10 BEATS
QRS DURATION: 142 MS
QT INTERVAL: 460 MS
QTC CALCULATION(BAZETT): 470 MS
QTC FREDERICIA: 467 MS
R AXIS: -51 DEGREES
RBC # BLD AUTO: 3.13 X10*6/UL (ref 4.5–5.9)
RH FACTOR (ANTIGEN D): NORMAL
SODIUM SERPL-SCNC: 135 MMOL/L (ref 136–145)
T AXIS: -2 DEGREES
T OFFSET: 445 MS
T-SPOT. TB INTERPRETATION: NEGATIVE
TACROLIMUS BLD-MCNC: 3 NG/ML
VENTRICULAR RATE: 63 BPM
WBC # BLD AUTO: 3.3 X10*3/UL (ref 4.4–11.3)

## 2025-03-06 PROCEDURE — 2500000002 HC RX 250 W HCPCS SELF ADMINISTERED DRUGS (ALT 637 FOR MEDICARE OP, ALT 636 FOR OP/ED): Performed by: NUTRITIONIST

## 2025-03-06 PROCEDURE — 99239 HOSP IP/OBS DSCHRG MGMT >30: CPT | Performed by: NUTRITIONIST

## 2025-03-06 PROCEDURE — 83735 ASSAY OF MAGNESIUM: CPT | Performed by: NUTRITIONIST

## 2025-03-06 PROCEDURE — 82947 ASSAY GLUCOSE BLOOD QUANT: CPT

## 2025-03-06 PROCEDURE — 36415 COLL VENOUS BLD VENIPUNCTURE: CPT | Performed by: NUTRITIONIST

## 2025-03-06 PROCEDURE — 6350000001 HC RX 635 EPOETIN >10,000 UNITS: Mod: JZ,TB | Performed by: INTERNAL MEDICINE

## 2025-03-06 PROCEDURE — 85025 COMPLETE CBC W/AUTO DIFF WBC: CPT | Performed by: NUTRITIONIST

## 2025-03-06 PROCEDURE — 2500000002 HC RX 250 W HCPCS SELF ADMINISTERED DRUGS (ALT 637 FOR MEDICARE OP, ALT 636 FOR OP/ED)

## 2025-03-06 PROCEDURE — 80197 ASSAY OF TACROLIMUS: CPT

## 2025-03-06 PROCEDURE — 90935 HEMODIALYSIS ONE EVALUATION: CPT | Performed by: INTERNAL MEDICINE

## 2025-03-06 PROCEDURE — RXMED WILLOW AMBULATORY MEDICATION CHARGE

## 2025-03-06 PROCEDURE — 2500000004 HC RX 250 GENERAL PHARMACY W/ HCPCS (ALT 636 FOR OP/ED)

## 2025-03-06 PROCEDURE — 2500000001 HC RX 250 WO HCPCS SELF ADMINISTERED DRUGS (ALT 637 FOR MEDICARE OP)

## 2025-03-06 PROCEDURE — 80069 RENAL FUNCTION PANEL: CPT | Performed by: NUTRITIONIST

## 2025-03-06 PROCEDURE — 86901 BLOOD TYPING SEROLOGIC RH(D): CPT

## 2025-03-06 RX ORDER — NIFEDIPINE 90 MG/1
90 TABLET, EXTENDED RELEASE ORAL 2 TIMES DAILY
Qty: 30 TABLET | Refills: 0 | Status: ON HOLD | OUTPATIENT
Start: 2025-03-06

## 2025-03-06 RX ORDER — INSULIN GLARGINE 100 [IU]/ML
10 INJECTION, SOLUTION SUBCUTANEOUS EVERY MORNING
Status: DISCONTINUED | OUTPATIENT
Start: 2025-03-06 | End: 2025-03-06

## 2025-03-06 RX ORDER — CARVEDILOL 12.5 MG/1
37.5 TABLET ORAL 2 TIMES DAILY
Qty: 90 TABLET | Refills: 1 | Status: ON HOLD | OUTPATIENT
Start: 2025-03-06

## 2025-03-06 RX ORDER — BENZONATATE 100 MG/1
100 CAPSULE ORAL 3 TIMES DAILY PRN
Qty: 20 CAPSULE | Refills: 0 | Status: ON HOLD | OUTPATIENT
Start: 2025-03-06

## 2025-03-06 RX ORDER — INSULIN LISPRO 100 [IU]/ML
0-5 INJECTION, SOLUTION INTRAVENOUS; SUBCUTANEOUS
Status: DISCONTINUED | OUTPATIENT
Start: 2025-03-06 | End: 2025-03-06 | Stop reason: HOSPADM

## 2025-03-06 RX ORDER — INSULIN GLARGINE 100 [IU]/ML
6 INJECTION, SOLUTION SUBCUTANEOUS EVERY MORNING
Qty: 15 ML | Refills: 0 | Status: SHIPPED | OUTPATIENT
Start: 2025-03-06 | End: 2025-03-06

## 2025-03-06 RX ORDER — CLOTRIMAZOLE 1 %
CREAM (GRAM) TOPICAL 2 TIMES DAILY
Qty: 90 G | Refills: 0 | Status: ON HOLD | OUTPATIENT
Start: 2025-03-06

## 2025-03-06 RX ORDER — INSULIN GLARGINE 100 [IU]/ML
6 INJECTION, SOLUTION SUBCUTANEOUS EVERY MORNING
Status: DISCONTINUED | OUTPATIENT
Start: 2025-03-06 | End: 2025-03-06 | Stop reason: HOSPADM

## 2025-03-06 RX ORDER — INSULIN LISPRO 100 [IU]/ML
0-5 INJECTION, SOLUTION INTRAVENOUS; SUBCUTANEOUS
Qty: 15 ML | Refills: 0 | Status: ON HOLD | OUTPATIENT
Start: 2025-03-06

## 2025-03-06 RX ORDER — INSULIN GLARGINE 100 [IU]/ML
6 INJECTION, SOLUTION SUBCUTANEOUS EVERY MORNING
Qty: 15 ML | Refills: 0 | Status: ON HOLD | OUTPATIENT
Start: 2025-03-06

## 2025-03-06 RX ORDER — NIFEDIPINE 90 MG/1
90 TABLET, EXTENDED RELEASE ORAL 2 TIMES DAILY
Qty: 30 TABLET | Refills: 1 | Status: CANCELLED | OUTPATIENT
Start: 2025-03-06

## 2025-03-06 RX ORDER — PRAMOXINE HYDROCHLORIDE 10 MG/ML
1 LOTION TOPICAL 3 TIMES DAILY
Qty: 237 ML | Refills: 0 | Status: SHIPPED | OUTPATIENT
Start: 2025-03-06 | End: 2025-03-06 | Stop reason: HOSPADM

## 2025-03-06 RX ORDER — INSULIN GLARGINE 100 [IU]/ML
6 INJECTION, SOLUTION SUBCUTANEOUS EVERY MORNING
Status: DISCONTINUED | OUTPATIENT
Start: 2025-03-07 | End: 2025-03-06

## 2025-03-06 RX ORDER — HEPARIN SODIUM 5000 [USP'U]/ML
5000 INJECTION, SOLUTION INTRAVENOUS; SUBCUTANEOUS EVERY 8 HOURS SCHEDULED
Status: DISCONTINUED | OUTPATIENT
Start: 2025-03-06 | End: 2025-03-06 | Stop reason: HOSPADM

## 2025-03-06 RX ORDER — NEOMYCIN SULFATE, POLYMYXIN B SULFATE AND DEXAMETHASONE 3.5; 10000; 1 MG/ML; [USP'U]/ML; MG/ML
2 SUSPENSION/ DROPS OPHTHALMIC EVERY MORNING
Qty: 5 ML | Refills: 0 | Status: ON HOLD | OUTPATIENT
Start: 2025-03-06

## 2025-03-06 RX ORDER — PREDNISONE 5 MG/1
5 TABLET ORAL DAILY
Qty: 30 TABLET | Refills: 0 | Status: ON HOLD | OUTPATIENT
Start: 2025-03-06 | End: 2025-04-05

## 2025-03-06 RX ORDER — ISAVUCONAZONIUM SULFATE 186 MG/1
372 CAPSULE ORAL SEE ADMIN INSTRUCTIONS
Qty: 4 CAPSULE | Refills: 0 | Status: CANCELLED | OUTPATIENT
Start: 2025-03-06

## 2025-03-06 RX ADMIN — SEVELAMER CARBONATE 800 MG: 800 TABLET, FILM COATED ORAL at 06:59

## 2025-03-06 RX ADMIN — ISAVUCONAZONIUM SULFATE 372 MG: 186 CAPSULE ORAL at 06:59

## 2025-03-06 RX ADMIN — SEVELAMER CARBONATE 800 MG: 800 TABLET, FILM COATED ORAL at 14:34

## 2025-03-06 RX ADMIN — RENO CAPS 1 CAPSULE: 100; 1.5; 1.7; 20; 10; 1; 150; 5; 6 CAPSULE ORAL at 14:36

## 2025-03-06 RX ADMIN — ISAVUCONAZONIUM SULFATE 372 MG: 186 CAPSULE ORAL at 14:34

## 2025-03-06 RX ADMIN — EPOETIN ALFA-EPBX 10000 UNITS: 10000 INJECTION, SOLUTION INTRAVENOUS; SUBCUTANEOUS at 14:34

## 2025-03-06 RX ADMIN — TIZANIDINE 1 MG: 2 TABLET ORAL at 14:35

## 2025-03-06 RX ADMIN — PANTOPRAZOLE SODIUM 40 MG: 40 TABLET, DELAYED RELEASE ORAL at 06:59

## 2025-03-06 RX ADMIN — ISOSORBIDE MONONITRATE 60 MG: 30 TABLET, EXTENDED RELEASE ORAL at 14:38

## 2025-03-06 RX ADMIN — TACROLIMUS 0.5 MG: 0.5 CAPSULE ORAL at 06:59

## 2025-03-06 RX ADMIN — NEOMYCIN SULFATE, POLYMYXIN B SULFATE AND DEXAMETHASONE 2 DROP: 3.5; 10000; 1 SUSPENSION/ DROPS OPHTHALMIC at 14:36

## 2025-03-06 RX ADMIN — PREDNISONE 5 MG: 5 TABLET ORAL at 14:37

## 2025-03-06 RX ADMIN — CINACALCET HYDROCHLORIDE 30 MG: 30 TABLET, FILM COATED ORAL at 14:36

## 2025-03-06 RX ADMIN — HEPARIN SODIUM 5000 UNITS: 5000 INJECTION INTRAVENOUS; SUBCUTANEOUS at 14:47

## 2025-03-06 RX ADMIN — METOCLOPRAMIDE 5 MG: 10 TABLET ORAL at 14:37

## 2025-03-06 RX ADMIN — METOCLOPRAMIDE 5 MG: 10 TABLET ORAL at 06:59

## 2025-03-06 RX ADMIN — NIFEDIPINE 90 MG: 90 TABLET, FILM COATED, EXTENDED RELEASE ORAL at 14:35

## 2025-03-06 ASSESSMENT — PAIN SCALES - GENERAL
PAINLEVEL_OUTOF10: 0 - NO PAIN

## 2025-03-06 ASSESSMENT — PAIN - FUNCTIONAL ASSESSMENT
PAIN_FUNCTIONAL_ASSESSMENT: NO/DENIES PAIN
PAIN_FUNCTIONAL_ASSESSMENT: 0-10
PAIN_FUNCTIONAL_ASSESSMENT: 0-10

## 2025-03-06 NOTE — CARE PLAN
The patient's goals for the shift include      The clinical goals for the shift include pts blood glusocse will be wdl through shift      Problem: Safety - Adult  Goal: Free from fall injury  Outcome: Progressing

## 2025-03-06 NOTE — NURSING NOTE
Report from Sending RN:    Report From: Chris  Recent Surgery of Procedure: No  Baseline Level of Consciousness (LOC): a and x 4   Oxygen Use: No  Type: RA  Diabetic: Yes, Hypoglycemia yesterday, please check.   Last BP Med Given Day of Dialysis: None  Last Pain Med Given: None  Lab Tests to be Obtained with Dialysis: No  Blood Transfusion to be Given During Dialysis: No  Available IV Access: Yes  Medications to be Administered During Dialysis: No  Continuous IV Infusion Running: No  Restraints on Currently or in the Last 24 Hours: No  Hand-Off Communication: Can stand for wt.   Dialysis Catheter Dressing: AVF  Last Dressing Change: NA

## 2025-03-06 NOTE — PROGRESS NOTES
Manolo Bashir is a 69 y.o. male on day 7 of admission presenting with Acute chest pain.      Subjective   Seen at HD, reported to feeling weak overall, no fut=rther stuttering noted.      Objective     Last Recorded Vitals  /63   Pulse 65   Temp 35.8 °C (96.4 °F) (Temporal)   Resp 18   Wt 68 kg (150 lb)   SpO2 98%   Intake/Output last 3 Shifts:  No intake or output data in the 24 hours ending 03/06/25 0931      Admission Weight  Weight: 68 kg (150 lb) (02/27/25 0721)    Daily Weight  02/27/25 : 68 kg (150 lb)    Image Results  Electrocardiogram, 12-lead PRN ACS symptoms  Normal sinus rhythm  Left axis deviation  Left bundle branch block  Abnormal ECG  When compared with ECG of 27-FEB-2025 08:32,  No significant change was found  CT head wo IV contrast  Narrative: Interpreted By:  Harvey Calderón and Stevens Alex   STUDY:  CT HEAD WO IV CONTRAST;  3/5/2025 11:35 am      INDICATION:  Signs/Symptoms:change in vision.      COMPARISON:  CT head 09/13/2023.      ACCESSION NUMBER(S):  [id number]      ORDERING CLINICIAN:  DEBBIE MARTÍNEZ      TECHNIQUE:  Axial noncontrast CT images of the head. Sagittal and coronal  reformats were provided.      FINDINGS:  BRAIN: No acute intracranial hemorrhage. No mass effect or midline  shift. Gray-white matter interfaces are preserved.Subtle white matter  hypodensities which are nonspecific but likely related to  microangiopathic white matter changes.      VENTRICLES and EXTRA-AXIAL SPACES: Normal.      EXTRACRANIAL SOFT TISSUES:  Within normal limits.      PARANASAL SINUSES/MASTOIDS: The visualized paranasal sinuses and  mastoid air cells are aerated.      BONES AND ORBITS: No displaced skull fracture. No suspicious osseous  lesion.  Orbits are within normal limits.      OTHER FINDINGS: None.      Impression: 1. There is no evidence of acute hemorrhage, mass lesion or acute  infarction.      I personally reviewed the images/study and I agree with the findings  as  stated by Chavez Lucas DO PGY-2. This study was interpreted at  [facility], [place], [place].      MACRO:  None      Signed by: Harvey Calderón 3/5/2025 11:52 AM  Dictation workstation:   [id number]      Physical Exam  Constitutional:       Comments: Chronically-ill appearing   HENT:      Head: Normocephalic and atraumatic.      Nose: No rhinorrhea.      Mouth/Throat:      Mouth: Mucous membranes are moist.   Eyes:      General:         Right eye: No discharge.         Left eye: No discharge.      Extraocular Movements: Extraocular movements intact.   Cardiovascular:      Rate and Rhythm: Normal rate.      Heart sounds:      No friction rub.   Pulmonary:      Effort: No respiratory distress.      Breath sounds: No wheezing or rhonchi.   Chest:      Chest wall: No tenderness.   Abdominal:      General: Bowel sounds are normal. There is no distension.      Tenderness: There is abdominal tenderness. There is guarding. There is no rebound.      Comments: Left abd tender   Musculoskeletal:         General: No deformity or signs of injury.      Right lower leg: No edema.      Left lower leg: No edema.   Skin:     General: Skin is warm and dry.      Findings: No rash.   Neurological:      General: No focal deficit present.      Mental Status: He is alert and oriented to person, place, and time. Mental status is at baseline.   Psychiatric:         Mood and Affect: Mood normal.         Behavior: Behavior normal.     CXR 2/27:  No acute cardiopulmonary disease.     CT Chest wo 3/4:  1. Improvement in pulmonary edema, resolution of left-sided pleural  effusion, and significant improvement in right-sided pleural effusion.  2. The main pulmonary artery is mildly dilated measures up to 3.5 cm  in diameter which can be seen in setting of pulmonary arterial  hypertension among other considerations.  3. Similar appearing cardiomegaly. Stable mildly dilated ascending  thoracic aorta measuring 3.9  cm.  4. Chronic appearing fracture/deformity of the right coracoid process.  5. Other chronic findings as above.    CT Head wo 3/5:  1. There is no evidence of acute hemorrhage, mass lesion or acute  infarction.    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  Patient's chest pain unlikely ACS as EKG and trops negative but responded to nitroglycerin and aspirin. BNP elevated and chest tender to palpation on exam. CT and exam showing fluid up, so pt went to dialysis 2/28 fluid removal, rsv/covid/flu neg, pt with cough +/- productive, after 3 sessions of HD along with starting antibiotics pt has has improvement in resp status. procal: 0.24 rsv/flu/covid  neg crp 1.88 elevated. In preparation for nephrectomy iso immunocompromise and pt with recurrent resp infections we are testing for atypicals and consulted pulm for a potential bronch however because pt resp status has improved with removal of fluid pulm holding on intervention, will plan to get ct chest 3/4 after hd to re-assess needs. Overall pt pain is improved but uncontrolled and resp status is improved.      Dispo:  Pt declines SNF so plan to dc home  Outpt: outpt repeat ct outpt 4-6 weeks fu with pulm, fu with pcp, transplant neph        Updates:  -fluid remove 3 Liter HD Post /78 HR 59   -ID following rec empiric antifungal and cw antibiotic: dc cefepime and start 3/5 Augmentin 500 mg/day x 6 weeks + isavuconazole loading and maintenance for 3 months. Per pharmacy will schedule Augmentin at night after hd   -cw tacrolimus per transplant nephro as isavuconazole does not increase dosage   -cw HD Tu Th Sa using  left arm fistula  -per transplant Consider Neupogen for ANC <0.5.   -transplant nephrology following recs, there is ongoing c/f infection, cw broadened antibiotics instead of home doxy/levofloxacin. Goal is to optimize patient for potential inpatient nephrectomy  though nephrectomy can be rescheduled if pt is not medically optimized during this stay.   -pending histoplasma ag, blastomyces antibody,afb, tspot             #Chest Pain, resolved  #HFpEF, diastolic dysfunction  #Cough,2/2 Edema vs Resp Infection, improving  #SOB, resolved  ::Pt with ESRD high risk for CAD,  sharp CP in sternal region, brought on by exertion and relieved w/nitroglycerin patient however no characteristic ACS chest pain, Trops and EKG however negative, no concerns for Pericarditis, trops neg, bnp elevated, afebrile, leukopenia, immunocompromise, recent steroid burst with taper, still on tacrolimus currently.  Has been on levofloxacin and doxycycline past few days, prescribed outpatient by transplant nephrology.  ::Heart score of 4  ::ED reported tenderness on palpation of chest at presentation with concern for possible MSK pain  ::pT currently denies any CP  -rsv/flu/covid  neg, crp 1.88 elevated, procal: 0.24   Plan:  -sublingual nitroglycerin prn for pain  -tessalon pearls started for cough +/- productive   -cw ns spray    -pain control as below   -fu CT Chest stat 3/4 wo contrast to assess fluid and resp status for consideration of thoracocentesis or bronch by pulm, pending official read but looks far improved from admission so likely, plan for outpt repeat ct outpt 4-6 weeks as pt greatly improving by getting fluid off  -Pending ID recs for management of resp status and antibiotic reg for discharge  -cw HD Tu Th Sa using left arm fistula, 3/4 3L removed bp/hr wnl   -dc vanc (3/2-3/4) iso MRSA nares negative 3/2, cw cefepime  -blood cx 2/27 ngtd final  -transplant nephrology following recs, there is ongoing c/f infection, cw broadened  antibiotics instead of home doxy/levofloxacin. Goal is to optimize patient for potential inpatient nephrectomy  though nephrectomy can be rescheduled if pt is not medically optimized during this stay.   -pending histoplasma ag, fungitell b d glucan, aspergillus galactomannan, blastomyces antibody, afb and tspot, resp viral panel. 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  -S/p renal transplant x 2, currently ESRD on hemodialysis.  -basline oliguirc to anuric  -EBVneg/CMV neg/BK neg DNA.  PLAN  - Transplant nephrology following, prednisone 5 mg daily, tacrolimus 0.5 mg BID  - Tacrolimus level trending   - low threshold for CT AP if pt worsening and with worsening abd pain  -prev nausea and vomiting, home reglan qtc 463  -bp control as below   - patient is oliguric to anuric  - pt stopped calcitriol outpt per HD nephrologist   -infectious workup as above   -pain management as below    -cw HD Tu Th Sa using left arm fistula, 3/4 3L removed bp/hr wnl   -per transplant Consider Neupogen for ANC <0.5.    -transplant nephrology following recs, there is ongoing c/f infection, cw broadened antibiotics instead of home doxy/levofloxacin. Goal is to optimize patient for potential inpatient nephrectomy  though nephrectomy can be rescheduled if pt is not medically optimized during this stay.        #Chronic Neck/Back pain  #Headaches  Plan:  -cw HD, gluose and bp control  -Started low dose tizanidine at night (1mg)  can max at 2mg maybe bid for muscle spasms causing back pain  -for chronic pain tylenol scheduled, lidocaine patches, warm compress, and oxy 2.5 and oxy 5, 3/4 dc oxy 10  iso pt lethargic and unable to work with pt/ot 3/3       #Gastroparesis  #Nausea and vomiting, controlled  -  cw restarted home metoclopramide  - will monitor     #Leucopenia, unresolved  #IgG and IgA lambda MGUS, stable   #Thrombocytopenia, unresolved  - Thrombocytopenia chronic, Plt today 82<<149, may be decreased in the setting of infection vs. Immunosuppression vs. MGUS  -spep (stable elevated M proteins and low but improved albumin)  PLAN  - CTM on daily CBC  - Continue home cinacalcet 30 mg daily  - pt stopped calcitriol outpt per HD nephrologist   -infectious management as above   -per transplant Consider Neupogen for ANC <0.5.         #Hx of recent cataract surgery  Plan:  - Cw home Maxitrol 1 drop L eye qAM      #Severe HTN, controlled   ::likely due to missed morning meds dose vs severe abd pain  ::BP-200s<<157   ::Pt reports his Bps are usually high when he goes for dialysis  ::home  carvedilol 37.5 mg twice daily, Imdur 60 mg as well as nifedipine XR 90 mg twice daily  Received dose of clonidine in the ED,  Plan:  -cw home meds      #T2DM  - Most recent A1c 9.1 12/16/24  ::Bld glucose-348  ::On lantus 18units in am, scheduled 3 units tid, ssi  -Beta-hydroxybutyrate levels-0.10  Plan:  -ac and at bedtime poct glucose   -carb controlled and renal diet -> npo midnight for nephrectomy 3/6  -glargine decreased to 5 units qam from 10, and ssi stopped for possible nephrectomy 3/6        #HLD  - Continue home pravastatin 10 mg daily     #GERD  - Continue home pantoprazole 40 mg daily     #Pruritus, improving  Plan:  -for itch cw sarna lotion and gave benadryl once 3/4          F: Replete PRN  E: Replete PRN  N: renal and carb controlled ->npo midnight for possible nephrectomy  A: PIV / left arm avf for hd  DVT ppx: Unfractionated heparin-->hold  at 4am for nephrectomy     Code Status: Full Code (confirmed on admission)   NOK:  Primary Emergency Contact: KETTY PLASENCIA, Home Phone: [phone]          Constance Rapp MD

## 2025-03-06 NOTE — PROGRESS NOTES
Transitional Care Coordination Progress Note:  Patient discussed during interdisciplinary rounds.   Team members present: ABIDA ZABALA  Plan per Medical/Surgical team: Acute chest pain  Payor: Medicare  Discharge disposition: Home  Potential Barriers: medical  ADOD:       Myah GHOSH, RN  Transitional Care Coordinator (TCC)  [phone]

## 2025-03-06 NOTE — NURSING NOTE
.Report to Receiving RN:    Report To: AYO Mittal  Time Report Called: 1115  Hand-Off Communication: Pt tolerated HD well with no concern, fluid remove 3 Liter Post /78 HR 59  Complications During Treatment: No  Ultrafiltration Treatment: No  Medications Administered During Dialysis: No  Blood Products Administered During Dialysis: No  Labs Sent During Dialysis: No  Heparin Drip Rate Changes: No  Dialysis Catheter Dressing: AVF  Last Dressing Change: N/A

## 2025-03-06 NOTE — NURSING NOTE
Pt refused Lovenox shot, stating that he has been walking all day, Pt educated about the benefits of Lovenox shot MD Celestine Espino made aware of this refusal.

## 2025-03-06 NOTE — HOSPITAL COURSE
69 year old with ESRD from hypertension s/p 2 kidney transplants (1998 and 2013) that failed in may 2024 now on HD (T/TH/S) through [facility]), MGUS, T2DM, HTN, prostate cancer s/p prostatectomy, urine incontinence who presented with CP 2/27, admission labs concerning for leukopenia, thrombocytopenia, however patient afebrile some infective process going on in the setting of his immunosuppression.  Patient's chest pain unlikely ACS as EKG and trops negative but responded to nitroglycerin and aspirin. BNP elevated and chest tender to palpation on exam. CT and exam showing fluid up, so pt went to dialysis 2/28 fluid removal, rsv/covid/flu neg, pt with cough +/- productive, after 3 sessions of HD along with starting antibiotics pt has has improvement in resp status. procal: 0.24 rsv/flu/covid  neg crp 1.88 elevated. In preparation for nephrectomy iso immunocompromise and pt with recurrent resp infections we are testing for atypicals and consulted pulm for a potential bronch however because pt resp status has improved with removal of fluid pulm holding on intervention, repeat ct chest 3/4 after hd to re-assess needs showed great improvement of r pleural effusion and resolution of left pleural effusion and overall decreased pulm edema. Therefore over all likely large component of sx from fluid overload although likely chronic atypical infection underlying as as well. Home doxy/levo as ppx but on admission pt on vanc 2/27 and 3/2 (briefly re-broadened while pending mrsa iso trying to optimize pt for nephrectomy) as well as zosyn 2/27 only. Home doxy given 2/28 and broadened to cefepime 2/28-3/4 (iso optimizing for nephrectomy). Fungitell b d glucan neg, aspergillus galactomannan neg, resp viral panel neg. Legionella/strep pneumo urine not yet collected as patient is oliguric to anuric. Pending histoplasma ag, blastomyces antibody,afb, tspot. With limited improvement and concern for atypical infection ID consulted and rec 3  "month course of antifungal and 6 weeks antibiotics with outpt fu and adjustment of reg as needed (details below).     Pt concerned with acute and chronic back/abd pain chrissy \"kidney pain\", pain not well controlled over course of stay but titrated meds as pt agreed (tylenol, oxy, lidocaine patches, tizanidine) however pt declined prn oxy often and never used tizanidine as he did not want to add to additional medications he was taking.       CT Head wo contrast 3/5 done iso pt concern for stuttering (per pt, but not observed by medical staff) and difficulty ready phone screen but ct wnl    Pt continued on tacrolimus and prednisone over the course of stay with transplant nephrology following inpt. Pt continued  HD Tu Th Sa using left arm fistula without major complications.   Transplant Nephrectomy date was on scheduled so pt was discharged in stable condition to home 3/6 per his preference not to go to SNF with plan for outpt follow up and scheduling of nephrectomy at a later date.           IMAGING:  CXR 2/27:  No acute cardiopulmonary disease.      CT Chest wo 3/4:  1. Improvement in pulmonary edema, resolution of left-sided pleural  effusion, and significant improvement in right-sided pleural effusion.  2. The main pulmonary artery is mildly dilated measures up to 3.5 cm  in diameter which can be seen in setting of pulmonary arterial  hypertension among other considerations.  3. Similar appearing cardiomegaly. Stable mildly dilated ascending  thoracic aorta measuring 3.9 cm.  4. Chronic appearing fracture/deformity of the right coracoid process.  5. Other chronic findings as above.     CT Head wo 3/5:  1. There is no evidence of acute hemorrhage, mass lesion or acute  infarction.      Medications:  -Please START  Augmentin one tablet daily (antibiotics)  Benzonatate as needed (for cough)  Clotrimazole (for groin wounds)  Epogen (to help your body make blood cells)  Cresemba please take 2 pills 3/6 at 10pm , 2 pills " 3/7 at 6am, then 2 pills once a day starting 3/8 (antifungal)  Ocean nose spray as needed (for congestion or runny nose)    -Please STOP  Calcitriol (for low vit d)  Doxycycline (antibiotic)  Vancomycin (antibiotic)  Levaquin (antibiotic)    -Please CHANGE how you take   Coreg 3 tablets (37.5 mg) by mouth 2 times a day. Hold for systolic blood pressure <140 and heart rate <60   Maxitrol 2 drops left eye every morning (eye drops)  Prednisone 5mg daily   Insulin LANTUS decreased from 18 units to 6 units (for high blood sugar with diabetes)  Insulin HUMALOG stopped scheduled HUMALOG, continue meal correction     (0 unit(s) if Blood glucose is between   1 unit(s) if Blood glucose is between 151-200  2 unit(s) if Blood glucose is between 201-250  3 unit(s) if Blood glucose is between 251-300  4 unit(s) if Blood glucose is between 301-350  5 unit(s) if Blood glucose is between 351-400)          Follow up:  -Primary care doctor within 1 week (referral has been placed)    -lab for CBC and CMP once a week, please fax to [phone] Attn. Dr. Preston     -Infectious Disease doctors (referral placed) to management cough/congestion symptoms as well as your antibiotic and antifungal medications    -GI appointment 3/26 at 11:40am at [facility] with Melia Qiu    -Podiatry appointment with Daxa Cote 4/7 at 11am at [facility]      -Nephrectomy will be scheduled with transplant surgery and your transplant kidney doctors.     -Transplant nephrology 4/23 with Dr. Dominguez at [facility] 4/23 at 1:40pm     -Appointment 4/22 with hematology/oncology Dr. Penn at [facility] at 2:30pm    -Appointment scheduled with Pulmonology (lung doctors) 4/10 1:40pm at [facility] with Chasidy Cabrales       -Repeat CT scan in approximately 4-6 weeks, to be followed up on by PCP/pulmonologist. Please call [phone] to schedule the CT scan. Please have this done before your appointment with the pulmonologist  on 4/10/25.     -fu pending inpt labs histoplasma ag, blastomyces antibody,afb, tspot

## 2025-03-06 NOTE — PROCEDURES
"DIALYSIS NOTE:    Seen during hemodialysis, undergoing treatment per submitted orders: 2 K, 2.5 Ca, 3.75  hours. Fluid removal 3 liters. as tolerated (keep SBP> 90mmHg).     /63   Pulse 65   Temp 35.8 °C (96.4 °F) (Temporal)   Resp 18   Ht 1.727 m (5' 8\")   Wt 68 kg (150 lb)   SpO2 98%   BMI 22.81 kg/m²       Additional Recommendations:  decrease Nifedipine to 60 mg BID  Consider IUF tomorrow, if still in house (pt admission wt 68 kg, today predialysis 64.1kg, with fluid removal goal as above.    Scheduled medications  acetaminophen, 975 mg, oral, TID  amoxicillin-pot clavulanate, 1 tablet, oral, q24h ZOË  carvedilol, 37.5 mg, oral, BID  cinacalcet, 30 mg, oral, Daily  clotrimazole, , Topical, BID  epoetin aida or biosimilar, 10,000 Units, intravenous, Once per day on Tuesday Thursday Saturday  heparin (porcine), 5,000 Units, subcutaneous, q8h ZOË  insulin glargine, 6 Units, subcutaneous, q AM  insulin lispro, 0-5 Units, subcutaneous, TID AC  isavuconazonium sulfate, 372 mg, oral, q8h ZOË   Followed by  [START ON 3/7/2025] isavuconazonium sulfate, 372 mg, oral, Daily  isosorbide mononitrate ER, 60 mg, oral, Daily  lidocaine, 2 patch, transdermal, Daily  metoclopramide, 5 mg, oral, TID AC  neomycin-polymyxin-dexAMETHasone, 2 drop, Left Eye, q AM  NIFEdipine ER, 90 mg, oral, BID  pantoprazole, 40 mg, oral, Daily before breakfast  polyethylene glycol, 17 g, oral, Daily  pramoxine, , Topical, TID  pravastatin, 10 mg, oral, Nightly  predniSONE, 5 mg, oral, Daily  sevelamer carbonate, 800 mg, oral, TID AC  tacrolimus, 0.5 mg, oral, q12h ZOË  vitamin B complex-vitamin C-folic acid, 1 capsule, oral, Daily      Continuous medications     PRN medications  PRN medications: benzonatate, dextrose, dextrose, glucagon, glucagon, nitroglycerin, ondansetron, oxyCODONE, oxyCODONE, sodium chloride, tiZANidine    "

## 2025-03-07 DIAGNOSIS — R91.8 GROUND GLASS OPACITY PRESENT ON IMAGING OF LUNG: ICD-10-CM

## 2025-03-07 NOTE — NURSING NOTE
Discharged reviewed with patient , PIV removed, patient wheeled to lobby in stable condition with belongings.

## 2025-03-08 NOTE — DISCHARGE SUMMARY
Discharge Diagnosis  Acute chest pain    Issues Requiring Follow-Up  -Primary care doctor within 1 week (referral has been placed)    -lab for CBC and CMP once a week, please fax to [phone] Attn. Dr. Preston     -Infectious Disease doctors (referral placed) to management cough/congestion symptoms as well as your antibiotic and antifungal medications    -GI appointment 3/26 at 11:40am at [facility] with Melia Qiu    -Podiatry appointment with Daxa Cote 4/7 at 11am at [facility]      -Nephrectomy will be scheduled with transplant surgery and your transplant kidney doctors.     -Transplant nephrology 4/23 with Dr. Dominguez at [facility] 4/23 at 1:40pm     -Appointment 4/22 with hematology/oncology Dr. Penn at [facility] at 2:30pm    -Appointment scheduled with Pulmonology (lung doctors) 4/10 1:40pm at [facility] with Chasidy Cabrales       -Repeat CT scan in approximately 4-6 weeks, to be followed up on by PCP/pulmonologist. Please call [phone] to schedule the CT scan. Please have this done before your appointment with the pulmonologist on 4/10/25.     -fu pending inpt labs histoplasma ag, blastomyces antibody,afb, tspot    Discharge Meds     Medication List      START taking these medications     amoxicillin-pot clavulanate 500-125 mg tablet; Commonly known as:   Augmentin; Take 1 tablet by mouth once every 24 hours for 42 doses. Take   at night, after dialysis.   benzonatate 100 mg capsule; Commonly known as: Tessalon; Take 1 capsule   (100 mg) by mouth 3 times a day as needed for cough. Do not crush or chew.   clotrimazole 1 % cream; Commonly known as: Lotrimin; Apply topically 2   times a day.   Cresemba 186 mg capsule capsule; Generic drug: isavuconazonium sulfate;   Take 2 capsules (372 mg) by mouth once daily. Take 2 capsules 3/6 at 10pm   and 2 capsules 3/7 6am, then starting 3/8 take 2 capsules once a day   Deep Sea Nasal 0.65 % nasal spray; Generic drug: sodium  "chloride;   Administer 1 spray into each nostril 4 times a day as needed for   congestion.   Epogen 10,000 unit/mL injection; Generic drug: epoetin aida; Inject 1 mL   (10,000 Units) under the skin every 7 days. Do not start before April 17, 2024.     CHANGE how you take these medications     carvedilol 12.5 mg tablet; Commonly known as: Coreg; Take 3 tablets   (37.5 mg) by mouth 2 times a day. Hold for systolic BP <140 and HR <60.   PATIENT NEEDS TO FOLLOW UP WITH CARDIOLOGY; What changed: additional   instructions   insulin lispro 100 unit/mL pen; Commonly known as: HumaLOG; Inject 0-5   Units under the skin 3 times a day before meals. Take as directed per   insulin instructions.; What changed: how much to take, when to take this,   additional instructions   Lantus Solostar U-100 Insulin 100 unit/mL (3 mL) pen; Generic drug:   insulin glargine; Inject 6 Units under the skin once daily in the morning.   Inject 6 units daily as directed, if glucose is above 150 increase by 2   units to a total of 8 units; What changed: how much to take, additional   instructions   neomycin-polymyxin-dexAMETHasone 3.5mg/mL-10,000 unit/mL-0.1 %   ophthalmic suspension; Commonly known as: Maxitrol; Administer 2 drops   into the left eye once daily in the morning.; What changed: how much to   take, how to take this, when to take this, Another medication with the   same name was removed. Continue taking this medication, and follow the   directions you see here.   predniSONE 5 mg tablet; Commonly known as: Deltasone; Take 1 tablet (5   mg) by mouth once daily.; What changed: how much to take, additional   instructions     CONTINUE taking these medications     acetaminophen 325 mg tablet; Commonly known as: Tylenol   BD Luer-Rita Syringe 3 mL 25 x 5/8\" syringe; Generic drug: syringe with   needle; Use to inject epogen.   cinacalcet 30 mg tablet; Commonly known as: Sensipar; Take 1 tablet (30   mg) by mouth once daily.   Easy Touch Alcohol " "Prep Pads pads, medicated; Generic drug: alcohol   swabs   Gvoke HypoPen 1-Pack 1 mg/0.2 mL auto-injector; Generic drug: glucagon;   Inject 1 mg into the muscle every 15 minutes if needed (For blood glucose   41 to 70 mg/dL and no IV access).   imiquimod 5 % cream; Commonly known as: Aldara; Apply 1 packet topically   3 times a week.   isosorbide mononitrate ER 60 mg 24 hr tablet; Commonly known as: Imdur;   Take 1 tablet (60 mg) by mouth once daily.   ketorolac 0.5 % ophthalmic solution; Commonly known as: Acular; Instill   1 drop into left eye three times a day FOR USE AFTER CATARACT SURGERY   metoclopramide 5 mg tablet; Commonly known as: Reglan; Take 1 tablet (5   mg) by mouth 3 times a day before meals.   NIFEdipine ER 90 mg 24 hr tablet; Commonly known as: Adalat CC; Take 1   tablet (90 mg) by mouth 2 times a day. Do not crush, chew, or split.   pantoprazole 40 mg EC tablet; Commonly known as: ProtoNix; Take 1 tablet   (40 mg) by mouth once daily in the morning. Take before meals. Do not   crush, chew, or split. Do not start before January 22, 2024.   pravastatin 10 mg tablet; Commonly known as: Pravachol; Take 1 tablet   (10 mg) by mouth once daily at bedtime.   Renal Caps 1 mg capsule; Generic drug: vitamin B complex-vitamin C-folic   acid; Take 1 capsule by mouth once daily.   sevelamer carbonate 800 mg tablet; Commonly known as: Renvela; Take 1   tablet (800 mg) by mouth 3 times a day before meals. Swallow tablet whole;   do not crush, break, or chew.   * tacrolimus 0.5 mg capsule; Commonly known as: Prograf; Take 1 capsule   (0.5 mg) by mouth 2 times a day.   TRUEdraw Lancing Device misc; Generic drug: lancing device; use as   directed   TRUEplus Pen Needle 32 gauge x 5/32\" needle; Generic drug: pen needle,   diabetic; Use 4 a day as directed   * Unilet Super Thin Lancets 30 gauge misc; Generic drug: lancets; USE TO   TEST BLOOD SUGAR THREE TIMES A DAY   * Unilet Super Thin Lancets 30 gauge misc; Generic " drug: lancets; 1 each   3 times a day.  * This list has 3 medication(s) that are the same as other medications   prescribed for you. Read the directions carefully, and ask your doctor or   other care provider to review them with you.     STOP taking these medications     calcitriol 0.5 mcg capsule; Commonly known as: Rocaltrol   doxycycline 100 mg tablet; Commonly known as: Adoxa   Firvanq 50 mg/mL oral solution; Generic drug: vancomycin   levoFLOXacin 250 mg tablet; Commonly known as: Levaquin     ASK your doctor about these medications     * tacrolimus 0.1 % ointment; Commonly known as: Protopic; Apply   topically 2 times a day.  * This list has 1 medication(s) that are the same as other medications   prescribed for you. Read the directions carefully, and ask your doctor or   other care provider to review them with you.       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  Patient's chest pain unlikely ACS as EKG and trops negative but responded to nitroglycerin and aspirin. BNP elevated and chest tender to palpation on exam. CT and exam showing fluid up, so pt went to dialysis 2/28 fluid removal, rsv/covid/flu neg, pt with cough +/- productive, after 3 sessions of HD along with starting antibiotics pt has has improvement in resp status. procal: 0.24 rsv/flu/covid  neg crp 1.88 elevated. In preparation for nephrectomy iso immunocompromise and  "pt with recurrent resp infections we are testing for atypicals and consulted pulm for a potential bronch however because pt resp status has improved with removal of fluid pulm holding on intervention, repeat ct chest 3/4 after hd to re-assess needs showed great improvement of r pleural effusion and resolution of left pleural effusion and overall decreased pulm edema. Therefore over all likely large component of sx from fluid overload although likely chronic atypical infection underlying as as well. Home doxy/levo as ppx but on admission pt on vanc 2/27 and 3/2 (briefly re-broadened while pending mrsa iso trying to optimize pt for nephrectomy) as well as zosyn 2/27 only. Home doxy given 2/28 and broadened to cefepime 2/28-3/4 (iso optimizing for nephrectomy). Fungitell b d glucan neg, aspergillus galactomannan neg, resp viral panel neg. Legionella/strep pneumo urine not yet collected as patient is oliguric to anuric. Pending histoplasma ag, blastomyces antibody,afb, tspot. With limited improvement and concern for atypical infection ID consulted and rec 3 month course of antifungal and 6 weeks antibiotics with outpt fu and adjustment of reg as needed (details below).     Pt concerned with acute and chronic back/abd pain chrissy \"kidney pain\", pain not well controlled over course of stay but titrated meds as pt agreed (tylenol, oxy, lidocaine patches, tizanidine) however pt declined prn oxy often and never used tizanidine as he did not want to add to additional medications he was taking.       CT Head wo contrast 3/5 done iso pt concern for stuttering (per pt, but not observed by medical staff) and difficulty ready phone screen but ct wnl    Pt continued on tacrolimus and prednisone over the course of stay with transplant nephrology following inpt. Pt continued  HD Tu Th Sa using left arm fistula without major complications.   Transplant Nephrectomy date was on scheduled so pt was discharged in stable condition to home 3/6 " per his preference not to go to SNF with plan for outpt follow up and scheduling of nephrectomy at a later date.           IMAGING:  CXR 2/27:  No acute cardiopulmonary disease.      CT Chest wo 3/4:  1. Improvement in pulmonary edema, resolution of left-sided pleural  effusion, and significant improvement in right-sided pleural effusion.  2. The main pulmonary artery is mildly dilated measures up to 3.5 cm  in diameter which can be seen in setting of pulmonary arterial  hypertension among other considerations.  3. Similar appearing cardiomegaly. Stable mildly dilated ascending  thoracic aorta measuring 3.9 cm.  4. Chronic appearing fracture/deformity of the right coracoid process.  5. Other chronic findings as above.     CT Head wo 3/5:  1. There is no evidence of acute hemorrhage, mass lesion or acute  infarction.      Medications:  -Please START  Augmentin one tablet daily (antibiotics)  Benzonatate as needed (for cough)  Clotrimazole (for groin wounds)  Epogen (to help your body make blood cells)  Cresemba please take 2 pills 3/6 at 10pm , 2 pills 3/7 at 6am, then 2 pills once a day starting 3/8 (antifungal)  Ocean nose spray as needed (for congestion or runny nose)    -Please STOP  Calcitriol (for low vit d)  Doxycycline (antibiotic)  Vancomycin (antibiotic)  Levaquin (antibiotic)    -Please CHANGE how you take   Coreg 3 tablets (37.5 mg) by mouth 2 times a day. Hold for systolic blood pressure <140 and heart rate <60   Maxitrol 2 drops left eye every morning (eye drops)  Prednisone 5mg daily   Insulin LANTUS decreased from 18 units to 6 units (for high blood sugar with diabetes)  Insulin HUMALOG stopped scheduled HUMALOG, continue meal correction     (0 unit(s) if Blood glucose is between   1 unit(s) if Blood glucose is between 151-200  2 unit(s) if Blood glucose is between 201-250  3 unit(s) if Blood glucose is between 251-300  4 unit(s) if Blood glucose is between 301-350  5 unit(s) if Blood glucose is  between 164-400)          Follow up:  -Primary care doctor within 1 week (referral has been placed)    -lab for CBC and CMP once a week, please fax to [phone] Attn. Dr. Preston     -Infectious Disease doctors (referral placed) to management cough/congestion symptoms as well as your antibiotic and antifungal medications    -GI appointment 3/26 at 11:40am at [facility] with Melia Qiu    -Podiatry appointment with Daxa Cote 4/7 at 11am at [facility]      -Nephrectomy will be scheduled with transplant surgery and your transplant kidney doctors.     -Transplant nephrology 4/23 with Dr. Dominguez at [facility] 4/23 at 1:40pm     -Appointment 4/22 with hematology/oncology Dr. Penn at [facility] at 2:30pm    -Appointment scheduled with Pulmonology (lung doctors) 4/10 1:40pm at [facility] with Chasidy Cabrales       -Repeat CT scan in approximately 4-6 weeks, to be followed up on by PCP/pulmonologist. Please call [phone] to schedule the CT scan. Please have this done before your appointment with the pulmonologist on 4/10/25.     -fu pending inpt labs histoplasma ag, blastomyces antibody,afb, tspot    Pertinent Physical Exam At Time of Discharge  Physical Exam  Constitutional:       General: He is not in acute distress.     Appearance: He is not diaphoretic.      Comments: Chronically-ill appearing. Thin.    HENT:      Head: Normocephalic and atraumatic.      Mouth/Throat:      Mouth: Mucous membranes are moist.   Eyes:      General:         Right eye: No discharge.         Left eye: No discharge.      Extraocular Movements: Extraocular movements intact.   Cardiovascular:      Rate and Rhythm: Normal rate and regular rhythm.      Heart sounds: No murmur heard.     No friction rub. No gallop.   Pulmonary:      Effort: No respiratory distress.      Breath sounds: No wheezing or rhonchi.   Chest:      Chest wall: No tenderness.   Abdominal:      General: Abdomen is flat. Bowel  sounds are normal. There is no distension.      Tenderness: There is abdominal tenderness. There is guarding. There is no right CVA tenderness or left CVA tenderness.      Comments: Left abd tender to palpation    Musculoskeletal:         General: No tenderness or signs of injury.      Right lower leg: No edema.      Left lower leg: No edema.   Skin:     General: Skin is warm and dry.      Coloration: Skin is not jaundiced.   Neurological:      General: No focal deficit present.      Mental Status: He is alert and oriented to person, place, and time. Mental status is at baseline.      Sensory: No sensory deficit.   Psychiatric:         Mood and Affect: Mood normal.         Behavior: Behavior normal.         Outpatient Follow-Up  Future Appointments   Date Time Provider Department [place]   3/20/2025  1:45 PM Sofia Lara MD [id number] [facility]   3/26/2025 11:40 AM Melia Qiu PA-C [id number] [facility]   4/7/2025 11:00 AM Daxa Cote DPM [id number] [facility]   4/9/2025  3:00 PM Elma Hutton APRN-CNP, JER [id number] [facility]   4/10/2025  1:40 PM Chasidy Cabrales APRN-CNP [id number] [facility]   4/22/2025  2:30 PM Elías Penn APRN-CNP [id number] [facility]   4/23/2025  1:40 PM Maciel Dominguez MD [id number] [facility]   5/14/2025  9:40 AM Estella Quinonez MD [id number] [facility]   6/18/2025 10:00 AM Vinicius Araiza MD [id number] [facility]         Constance Rapp MD

## 2025-03-13 DIAGNOSIS — Z94.0 KIDNEY REPLACED BY TRANSPLANT (HHS-HCC): ICD-10-CM

## 2025-03-14 DIAGNOSIS — L30.9 DERMATITIS: Primary | ICD-10-CM

## 2025-03-14 DIAGNOSIS — R91.8 GROUND GLASS OPACITY PRESENT ON IMAGING OF LUNG: ICD-10-CM

## 2025-03-14 DIAGNOSIS — A63.0 CONDYLOMA: ICD-10-CM

## 2025-03-14 RX ORDER — ACETAMINOPHEN 325 MG/1
975 TABLET ORAL EVERY 6 HOURS PRN
Qty: 30 TABLET | Refills: 11 | OUTPATIENT
Start: 2025-03-14 | End: 2026-03-14

## 2025-03-14 RX ORDER — IMIQUIMOD 12.5 MG/.25G
1 CREAM TOPICAL 3 TIMES WEEKLY
Qty: 12 PACKET | Refills: 3 | Status: CANCELLED | OUTPATIENT
Start: 2025-03-14 | End: 2026-03-14

## 2025-03-17 RX ORDER — FLUOCINONIDE 0.5 MG/G
OINTMENT TOPICAL
Qty: 60 G | Refills: 3 | Status: ON HOLD | OUTPATIENT
Start: 2025-03-17

## 2025-03-17 NOTE — DOCUMENTATION CLARIFICATION NOTE
"    PATIENT:               KELVIN ROB  ACCT #:                  [id number]  MRN:                       [id number]  :                       1955  ADMIT DATE:       2025 6:41 AM  DISCH DATE:        3/6/2025 5:44 PM  RESPONDING PROVIDER #:        [id number]          PROVIDER RESPONSE TEXT:    Chest pain with multiple etiologies including muscle strain from coughing, fluid overload, and chronic infection    CDI QUERY TEXT:    Clarification    Instruction:    Based on your assessment of the patient and the clinical information, please provide the requested documentation by clicking on the appropriate radio button and enter any additional information if prompted.    Question: Based on your medical judgment, can you please clarify which of these conditions is the most clinically supported    When answering this query, please exercise your independent professional judgment. The fact that a question is being asked, does not imply that any particular answer is desired or expected.    The patient's clinical indicators include:  Clinical Information:  36 DCS: 68 YO w/PMH s/f HFpEF and ESRD s/p kidney transplants x2 that failed noted currently on HD.  Presented to ED with sudden sharp chest pain.    There is conflicting documentation in the medical record which requires clarification.    - \"Chest pain due to acute muscle strain secondary to coughing\" was documented on 3/5 DCN by Dr. PATIENCE Rapp    - \"large component of sx from fluid overload although likely chronic atypical infection underlying as as well\" was documented on 3/6 DCS by Dr. PATIENCE Rapp    Clinical Indicators:  3/1 ID consult \"sudden onset sharp chest pain, associated with productive cough\"    3/ Transplant PN: during recent admission pt \"Started on empiric PO abx (levo and doxy) for suspected pneumonia\"    3/ IM PN \"Chest Pain, 2/2 Edema vs Resp Infection, improving\"    3/ PN attestation \"Chest pain, (question corby)  Volume overload versus " "infection\"    3/3 Nephrology PN \"Tolerated hemodialysis Saturday with net fluid loss 2L\"    3/3 Pulmonology PN \"Review of recent imaging showed an improvement I'm his right sided pleural effusion with dialysis and without thoracentesis\"  \"CT scan appearance and the change of his chest imaging in response to dialysis are most consistent with pulmonary edema with bilateral pleural effusion (right > left) from his renal and cardiac disease\"  \"defer bronchoscopy with BAL for now, due to the low likelihood for infection\"    3/5 DCN \"Chest pain due to acute muscle strain secondary to coughing\"    3/6 DCS \"\"large component of sx from fluid overload although likely chronic atypical infection underlying as as well\"    Labs:  2/27 BNP 1042  2/28 CRP 1.88    Treatment:  Consults to ID, nephrology, and pulmonology.  Hemodialysis on 2/27, 2/28, 3/1.  Antibiotics and Tessalon Perles.    Risk Factors:  Recent hospitalizations for PNA.  HFpEF, HTN, ESRD on HD s/p failed kidney transplants.  Options provided:  -- Chest pain d/t muscle strain from coughing  -- Chest pain with multiple etiologies including muscle strain from coughing, fluid overload, and chronic infection  -- Chest pain due to other, please specify, Please add additional information below  -- Other - I will add my own diagnosis  -- Refer to Clinical Documentation Reviewer    Query created by: Evie Goss on 3/14/2025 4:41 PM      Electronically signed by:  DEBBIE MARTÍNEZ MD MPH 3/17/2025 9:21 AM          "

## 2025-03-18 ENCOUNTER — APPOINTMENT (OUTPATIENT)
Dept: RADIOLOGY | Facility: HOSPITAL | Age: 70
DRG: 640 | End: 2025-03-18
Payer: MEDICARE

## 2025-03-18 ENCOUNTER — APPOINTMENT (OUTPATIENT)
Dept: DIALYSIS | Facility: HOSPITAL | Age: 70
End: 2025-03-18
Payer: MEDICARE

## 2025-03-18 ENCOUNTER — CLINICAL SUPPORT (OUTPATIENT)
Dept: EMERGENCY MEDICINE | Facility: HOSPITAL | Age: 70
DRG: 640 | End: 2025-03-18
Payer: MEDICARE

## 2025-03-18 ENCOUNTER — HOSPITAL ENCOUNTER (INPATIENT)
Facility: HOSPITAL | Age: 70
DRG: 640 | End: 2025-03-18
Attending: EMERGENCY MEDICINE | Admitting: INTERNAL MEDICINE
Payer: MEDICARE

## 2025-03-18 DIAGNOSIS — T82.9XXD DIALYSIS COMPLICATION, SUBSEQUENT ENCOUNTER: ICD-10-CM

## 2025-03-18 DIAGNOSIS — T88.8XXS OTHER SPECIFIED COMPLICATIONS OF SURGICAL AND MEDICAL CARE, NOT ELSEWHERE CLASSIFIED, SEQUELA: ICD-10-CM

## 2025-03-18 DIAGNOSIS — R19.7 DIARRHEA, UNSPECIFIED TYPE: ICD-10-CM

## 2025-03-18 DIAGNOSIS — E87.5 HYPERKALEMIA: Primary | ICD-10-CM

## 2025-03-18 DIAGNOSIS — T82.858A ARTERIOVENOUS FISTULA STENOSIS, INITIAL ENCOUNTER: ICD-10-CM

## 2025-03-18 LAB
ALBUMIN SERPL BCP-MCNC: 3.5 G/DL (ref 3.4–5)
ALBUMIN SERPL BCP-MCNC: 3.8 G/DL (ref 3.4–5)
ALP SERPL-CCNC: 67 U/L (ref 33–136)
ALP SERPL-CCNC: 70 U/L (ref 33–136)
ALT SERPL W P-5'-P-CCNC: 12 U/L (ref 10–52)
ALT SERPL W P-5'-P-CCNC: 14 U/L (ref 10–52)
ANION GAP BLDV CALCULATED.4IONS-SCNC: 10 MMOL/L (ref 10–25)
ANION GAP BLDV CALCULATED.4IONS-SCNC: 12 MMOL/L (ref 10–25)
ANION GAP SERPL CALC-SCNC: 15 MMOL/L (ref 10–20)
ANION GAP SERPL CALC-SCNC: 18 MMOL/L (ref 10–20)
AST SERPL W P-5'-P-CCNC: 17 U/L (ref 9–39)
AST SERPL W P-5'-P-CCNC: 19 U/L (ref 9–39)
ATRIAL RATE: 97 BPM
BASE EXCESS BLDV CALC-SCNC: -2.8 MMOL/L (ref -2–3)
BASE EXCESS BLDV CALC-SCNC: 0.7 MMOL/L (ref -2–3)
BASOPHILS # BLD AUTO: 0.02 X10*3/UL (ref 0–0.1)
BASOPHILS NFR BLD AUTO: 0.5 %
BILIRUB SERPL-MCNC: 0.5 MG/DL (ref 0–1.2)
BILIRUB SERPL-MCNC: 0.5 MG/DL (ref 0–1.2)
BODY TEMPERATURE: 37 DEGREES CELSIUS
BODY TEMPERATURE: 37 DEGREES CELSIUS
BUN SERPL-MCNC: 27 MG/DL (ref 6–23)
BUN SERPL-MCNC: 39 MG/DL (ref 6–23)
C COLI+JEJ+UPSA DNA STL QL NAA+PROBE: NOT DETECTED
C DIF TOX TCDA+TCDB STL QL NAA+PROBE: NOT DETECTED
CA-I BLDV-SCNC: 1.3 MMOL/L (ref 1.1–1.33)
CA-I BLDV-SCNC: 1.44 MMOL/L (ref 1.1–1.33)
CALCIUM SERPL-MCNC: 10.3 MG/DL (ref 8.6–10.6)
CALCIUM SERPL-MCNC: 10.8 MG/DL (ref 8.6–10.6)
CHLORIDE BLDV-SCNC: 103 MMOL/L (ref 98–107)
CHLORIDE BLDV-SCNC: 105 MMOL/L (ref 98–107)
CHLORIDE SERPL-SCNC: 101 MMOL/L (ref 98–107)
CHLORIDE SERPL-SCNC: 102 MMOL/L (ref 98–107)
CO2 SERPL-SCNC: 21 MMOL/L (ref 21–32)
CO2 SERPL-SCNC: 23 MMOL/L (ref 21–32)
CREAT SERPL-MCNC: 12.31 MG/DL (ref 0.5–1.3)
CREAT SERPL-MCNC: 9.72 MG/DL (ref 0.5–1.3)
CRP SERPL-MCNC: 4.59 MG/DL
EC STX1 GENE STL QL NAA+PROBE: NOT DETECTED
EC STX2 GENE STL QL NAA+PROBE: NOT DETECTED
EGFRCR SERPLBLD CKD-EPI 2021: 4 ML/MIN/1.73M*2
EGFRCR SERPLBLD CKD-EPI 2021: 5 ML/MIN/1.73M*2
EOSINOPHIL # BLD AUTO: 0.14 X10*3/UL (ref 0–0.7)
EOSINOPHIL NFR BLD AUTO: 3.6 %
ERYTHROCYTE [DISTWIDTH] IN BLOOD BY AUTOMATED COUNT: 16.8 % (ref 11.5–14.5)
ERYTHROCYTE [SEDIMENTATION RATE] IN BLOOD BY WESTERGREN METHOD: 39 MM/H (ref 0–20)
FLUAV RNA RESP QL NAA+PROBE: NOT DETECTED
FLUBV RNA RESP QL NAA+PROBE: NOT DETECTED
GLUCOSE BLDV-MCNC: 127 MG/DL (ref 74–99)
GLUCOSE BLDV-MCNC: 82 MG/DL (ref 74–99)
GLUCOSE SERPL-MCNC: 117 MG/DL (ref 74–99)
GLUCOSE SERPL-MCNC: 70 MG/DL (ref 74–99)
HAV IGM SER QL: NONREACTIVE
HBV CORE IGM SER QL: NONREACTIVE
HBV SURFACE AG SERPL QL IA: NONREACTIVE
HCO3 BLDV-SCNC: 21.9 MMOL/L (ref 22–26)
HCO3 BLDV-SCNC: 26 MMOL/L (ref 22–26)
HCT VFR BLD AUTO: 36.7 % (ref 41–52)
HCT VFR BLD EST: 39 % (ref 41–52)
HCT VFR BLD EST: 43 % (ref 41–52)
HCV AB SER QL: REACTIVE
HGB BLD-MCNC: 12.5 G/DL (ref 13.5–17.5)
HGB BLDV-MCNC: 13 G/DL (ref 13.5–17.5)
HGB BLDV-MCNC: 14.3 G/DL (ref 13.5–17.5)
HIV 1+2 AB+HIV1 P24 AG SERPL QL IA: NONREACTIVE
HOLD SPECIMEN: NORMAL
IMM GRANULOCYTES # BLD AUTO: 0.01 X10*3/UL (ref 0–0.7)
IMM GRANULOCYTES NFR BLD AUTO: 0.3 % (ref 0–0.9)
INHALED O2 CONCENTRATION: 21 %
LACTATE BLDV-SCNC: 1 MMOL/L (ref 0.4–2)
LACTATE BLDV-SCNC: 2.1 MMOL/L (ref 0.4–2)
LACTATE SERPL-SCNC: 0.9 MMOL/L (ref 0.4–2)
LIPASE SERPL-CCNC: 15 U/L (ref 9–82)
LYMPHOCYTES # BLD AUTO: 0.55 X10*3/UL (ref 1.2–4.8)
LYMPHOCYTES NFR BLD AUTO: 14 %
MAGNESIUM SERPL-MCNC: 2.11 MG/DL (ref 1.6–2.4)
MCH RBC QN AUTO: 28.3 PG (ref 26–34)
MCHC RBC AUTO-ENTMCNC: 34.1 G/DL (ref 32–36)
MCV RBC AUTO: 83 FL (ref 80–100)
MONOCYTES # BLD AUTO: 0.44 X10*3/UL (ref 0.1–1)
MONOCYTES NFR BLD AUTO: 11.2 %
NEUTROPHILS # BLD AUTO: 2.78 X10*3/UL (ref 1.2–7.7)
NEUTROPHILS NFR BLD AUTO: 70.4 %
NOROVIRUS GI + GII RNA STL NAA+PROBE: NOT DETECTED
NRBC BLD-RTO: 0 /100 WBCS (ref 0–0)
OXYHGB MFR BLDV: 66.6 % (ref 45–75)
OXYHGB MFR BLDV: 80.3 % (ref 45–75)
P AXIS: 81 DEGREES
P OFFSET: 191 MS
P ONSET: 141 MS
PCO2 BLDV: 37 MM HG (ref 41–51)
PCO2 BLDV: 43 MM HG (ref 41–51)
PH BLDV: 7.38 PH (ref 7.33–7.43)
PH BLDV: 7.39 PH (ref 7.33–7.43)
PHOSPHATE SERPL-MCNC: 2.2 MG/DL (ref 2.5–4.9)
PLATELET # BLD AUTO: 103 X10*3/UL (ref 150–450)
PO2 BLDV: 45 MM HG (ref 35–45)
PO2 BLDV: 57 MM HG (ref 35–45)
POTASSIUM BLDV-SCNC: 6 MMOL/L (ref 3.5–5.3)
POTASSIUM BLDV-SCNC: 7.7 MMOL/L (ref 3.5–5.3)
POTASSIUM SERPL-SCNC: 5 MMOL/L (ref 3.5–5.3)
POTASSIUM SERPL-SCNC: 5 MMOL/L (ref 3.5–5.3)
POTASSIUM SERPL-SCNC: 5.5 MMOL/L (ref 3.5–5.3)
POTASSIUM SERPL-SCNC: 6.2 MMOL/L (ref 3.5–5.3)
PR INTERVAL: 150 MS
PROCALCITONIN SERPL-MCNC: 0.67 NG/ML
PROT SERPL-MCNC: 7.6 G/DL (ref 6.4–8.2)
PROT SERPL-MCNC: 7.8 G/DL (ref 6.4–8.2)
Q ONSET: 216 MS
QRS COUNT: 16 BEATS
QRS DURATION: 140 MS
QT INTERVAL: 354 MS
QTC CALCULATION(BAZETT): 449 MS
QTC FREDERICIA: 415 MS
R AXIS: 58 DEGREES
RBC # BLD AUTO: 4.41 X10*6/UL (ref 4.5–5.9)
RSV RNA RESP QL NAA+PROBE: NOT DETECTED
RV RNA STL NAA+PROBE: DETECTED
SALMONELLA DNA STL QL NAA+PROBE: NOT DETECTED
SAO2 % BLDV: 68 % (ref 45–75)
SAO2 % BLDV: 83 % (ref 45–75)
SARS-COV-2 RNA RESP QL NAA+PROBE: NOT DETECTED
SHIGELLA DNA SPEC QL NAA+PROBE: NOT DETECTED
SODIUM BLDV-SCNC: 131 MMOL/L (ref 136–145)
SODIUM BLDV-SCNC: 133 MMOL/L (ref 136–145)
SODIUM SERPL-SCNC: 134 MMOL/L (ref 136–145)
SODIUM SERPL-SCNC: 135 MMOL/L (ref 136–145)
T AXIS: 37 DEGREES
T OFFSET: 393 MS
TACROLIMUS BLD-MCNC: <2 NG/ML
TSH SERPL-ACNC: 2.84 MIU/L (ref 0.44–3.98)
V CHOLERAE DNA STL QL NAA+PROBE: NOT DETECTED
VENTRICULAR RATE: 97 BPM
WBC # BLD AUTO: 3.9 X10*3/UL (ref 4.4–11.3)
Y ENTEROCOL DNA STL QL NAA+PROBE: NOT DETECTED

## 2025-03-18 PROCEDURE — 87506 IADNA-DNA/RNA PROBE TQ 6-11: CPT

## 2025-03-18 PROCEDURE — 2500000004 HC RX 250 GENERAL PHARMACY W/ HCPCS (ALT 636 FOR OP/ED)

## 2025-03-18 PROCEDURE — 83735 ASSAY OF MAGNESIUM: CPT | Performed by: PHYSICIAN ASSISTANT

## 2025-03-18 PROCEDURE — 74177 CT ABD & PELVIS W/CONTRAST: CPT

## 2025-03-18 PROCEDURE — 90937 HEMODIALYSIS REPEATED EVAL: CPT

## 2025-03-18 PROCEDURE — 82805 BLOOD GASES W/O2 SATURATION: CPT

## 2025-03-18 PROCEDURE — 83630 LACTOFERRIN FECAL (QUAL): CPT

## 2025-03-18 PROCEDURE — 83605 ASSAY OF LACTIC ACID: CPT | Performed by: PHYSICIAN ASSISTANT

## 2025-03-18 PROCEDURE — 2550000001 HC RX 255 CONTRASTS: Performed by: EMERGENCY MEDICINE

## 2025-03-18 PROCEDURE — 83690 ASSAY OF LIPASE: CPT | Performed by: PHYSICIAN ASSISTANT

## 2025-03-18 PROCEDURE — 96375 TX/PRO/DX INJ NEW DRUG ADDON: CPT

## 2025-03-18 PROCEDURE — 36415 COLL VENOUS BLD VENIPUNCTURE: CPT

## 2025-03-18 PROCEDURE — 85025 COMPLETE CBC W/AUTO DIFF WBC: CPT | Performed by: PHYSICIAN ASSISTANT

## 2025-03-18 PROCEDURE — 2500000002 HC RX 250 W HCPCS SELF ADMINISTERED DRUGS (ALT 637 FOR MEDICARE OP, ALT 636 FOR OP/ED)

## 2025-03-18 PROCEDURE — 96365 THER/PROPH/DIAG IV INF INIT: CPT | Mod: 59

## 2025-03-18 PROCEDURE — 93005 ELECTROCARDIOGRAM TRACING: CPT

## 2025-03-18 PROCEDURE — 84443 ASSAY THYROID STIM HORMONE: CPT

## 2025-03-18 PROCEDURE — 36415 COLL VENOUS BLD VENIPUNCTURE: CPT | Performed by: PHYSICIAN ASSISTANT

## 2025-03-18 PROCEDURE — 87522 HEPATITIS C REVRS TRNSCRPJ: CPT

## 2025-03-18 PROCEDURE — 84100 ASSAY OF PHOSPHORUS: CPT | Performed by: PHYSICIAN ASSISTANT

## 2025-03-18 PROCEDURE — 2500000001 HC RX 250 WO HCPCS SELF ADMINISTERED DRUGS (ALT 637 FOR MEDICARE OP)

## 2025-03-18 PROCEDURE — 80197 ASSAY OF TACROLIMUS: CPT

## 2025-03-18 PROCEDURE — 5A1D70Z PERFORMANCE OF URINARY FILTRATION, INTERMITTENT, LESS THAN 6 HOURS PER DAY: ICD-10-PCS

## 2025-03-18 PROCEDURE — 84132 ASSAY OF SERUM POTASSIUM: CPT

## 2025-03-18 PROCEDURE — G0378 HOSPITAL OBSERVATION PER HR: HCPCS

## 2025-03-18 PROCEDURE — 74018 RADEX ABDOMEN 1 VIEW: CPT

## 2025-03-18 PROCEDURE — 8010000001 HC DIALYSIS - HEMODIALYSIS PER DAY

## 2025-03-18 PROCEDURE — 82435 ASSAY OF BLOOD CHLORIDE: CPT

## 2025-03-18 PROCEDURE — 87329 GIARDIA AG IA: CPT

## 2025-03-18 PROCEDURE — 71045 X-RAY EXAM CHEST 1 VIEW: CPT | Mod: FOREIGN READ | Performed by: RADIOLOGY

## 2025-03-18 PROCEDURE — 82653 EL-1 FECAL QUANTITATIVE: CPT

## 2025-03-18 PROCEDURE — 82710 FATS/LIPIDS FECES QUANT: CPT

## 2025-03-18 PROCEDURE — 96372 THER/PROPH/DIAG INJ SC/IM: CPT

## 2025-03-18 PROCEDURE — 74018 RADEX ABDOMEN 1 VIEW: CPT | Mod: FOREIGN READ | Performed by: RADIOLOGY

## 2025-03-18 PROCEDURE — 87637 SARSCOV2&INF A&B&RSV AMP PRB: CPT | Performed by: PHYSICIAN ASSISTANT

## 2025-03-18 PROCEDURE — 87493 C DIFF AMPLIFIED PROBE: CPT

## 2025-03-18 PROCEDURE — 83993 ASSAY FOR CALPROTECTIN FECAL: CPT

## 2025-03-18 PROCEDURE — 74177 CT ABD & PELVIS W/CONTRAST: CPT | Mod: FOREIGN READ | Performed by: RADIOLOGY

## 2025-03-18 PROCEDURE — 99285 EMERGENCY DEPT VISIT HI MDM: CPT | Mod: 25 | Performed by: EMERGENCY MEDICINE

## 2025-03-18 PROCEDURE — 85018 HEMOGLOBIN: CPT

## 2025-03-18 PROCEDURE — 87328 CRYPTOSPORIDIUM AG IA: CPT

## 2025-03-18 PROCEDURE — 87389 HIV-1 AG W/HIV-1&-2 AB AG IA: CPT

## 2025-03-18 PROCEDURE — 86140 C-REACTIVE PROTEIN: CPT

## 2025-03-18 PROCEDURE — 80074 ACUTE HEPATITIS PANEL: CPT

## 2025-03-18 PROCEDURE — 71045 X-RAY EXAM CHEST 1 VIEW: CPT

## 2025-03-18 PROCEDURE — 80053 COMPREHEN METABOLIC PANEL: CPT | Performed by: PHYSICIAN ASSISTANT

## 2025-03-18 PROCEDURE — 85652 RBC SED RATE AUTOMATED: CPT

## 2025-03-18 PROCEDURE — 84145 PROCALCITONIN (PCT): CPT

## 2025-03-18 RX ORDER — MORPHINE SULFATE 4 MG/ML
4 INJECTION INTRAVENOUS ONCE
Status: COMPLETED | OUTPATIENT
Start: 2025-03-18 | End: 2025-03-18

## 2025-03-18 RX ORDER — CARVEDILOL 12.5 MG/1
37.5 TABLET ORAL 2 TIMES DAILY
Status: DISCONTINUED | OUTPATIENT
Start: 2025-03-18 | End: 2025-03-21

## 2025-03-18 RX ORDER — PRAVASTATIN SODIUM 20 MG/1
10 TABLET ORAL NIGHTLY
Status: DISCONTINUED | OUTPATIENT
Start: 2025-03-18 | End: 2025-03-26 | Stop reason: HOSPADM

## 2025-03-18 RX ORDER — SEVELAMER CARBONATE 800 MG/1
800 TABLET, FILM COATED ORAL
Status: DISCONTINUED | OUTPATIENT
Start: 2025-03-19 | End: 2025-03-26 | Stop reason: HOSPADM

## 2025-03-18 RX ORDER — TACROLIMUS 0.5 MG/1
0.5 CAPSULE ORAL 2 TIMES DAILY
Status: DISCONTINUED | OUTPATIENT
Start: 2025-03-18 | End: 2025-03-26 | Stop reason: HOSPADM

## 2025-03-18 RX ORDER — FLUOCINONIDE 0.5 MG/G
OINTMENT TOPICAL 2 TIMES DAILY
Status: DISCONTINUED | OUTPATIENT
Start: 2025-03-18 | End: 2025-03-26 | Stop reason: HOSPADM

## 2025-03-18 RX ORDER — CINACALCET 30 MG/1
30 TABLET, FILM COATED ORAL DAILY
Status: DISCONTINUED | OUTPATIENT
Start: 2025-03-18 | End: 2025-03-26 | Stop reason: HOSPADM

## 2025-03-18 RX ORDER — HEPARIN SODIUM 5000 [USP'U]/ML
5000 INJECTION, SOLUTION INTRAVENOUS; SUBCUTANEOUS EVERY 8 HOURS SCHEDULED
Status: DISCONTINUED | OUTPATIENT
Start: 2025-03-18 | End: 2025-03-24

## 2025-03-18 RX ORDER — PANTOPRAZOLE SODIUM 40 MG/1
40 TABLET, DELAYED RELEASE ORAL
Status: DISCONTINUED | OUTPATIENT
Start: 2025-03-19 | End: 2025-03-26 | Stop reason: HOSPADM

## 2025-03-18 RX ORDER — DEXTROSE 50 % IN WATER (D50W) INTRAVENOUS SYRINGE
25 ONCE
Status: DISCONTINUED | OUTPATIENT
Start: 2025-03-18 | End: 2025-03-26 | Stop reason: HOSPADM

## 2025-03-18 RX ORDER — ISOSORBIDE MONONITRATE 30 MG/1
60 TABLET, EXTENDED RELEASE ORAL DAILY
Status: DISCONTINUED | OUTPATIENT
Start: 2025-03-18 | End: 2025-03-26 | Stop reason: HOSPADM

## 2025-03-18 RX ORDER — CALCIUM GLUCONATE 20 MG/ML
2 INJECTION, SOLUTION INTRAVENOUS ONCE
Status: DISCONTINUED | OUTPATIENT
Start: 2025-03-18 | End: 2025-03-18

## 2025-03-18 RX ORDER — CLOTRIMAZOLE 1 %
CREAM (GRAM) TOPICAL 2 TIMES DAILY
Status: DISCONTINUED | OUTPATIENT
Start: 2025-03-18 | End: 2025-03-26 | Stop reason: HOSPADM

## 2025-03-18 RX ORDER — CALCIUM GLUCONATE 20 MG/ML
2 INJECTION, SOLUTION INTRAVENOUS ONCE
Status: COMPLETED | OUTPATIENT
Start: 2025-03-18 | End: 2025-03-18

## 2025-03-18 RX ORDER — INDOMETHACIN 25 MG/1
50 CAPSULE ORAL ONCE
Status: DISCONTINUED | OUTPATIENT
Start: 2025-03-18 | End: 2025-03-19

## 2025-03-18 RX ORDER — INSULIN GLARGINE 100 [IU]/ML
3 INJECTION, SOLUTION SUBCUTANEOUS EVERY 24 HOURS
Status: DISCONTINUED | OUTPATIENT
Start: 2025-03-18 | End: 2025-03-26 | Stop reason: HOSPADM

## 2025-03-18 RX ORDER — DEXTROSE 50 % IN WATER (D50W) INTRAVENOUS SYRINGE
12.5
Status: DISCONTINUED | OUTPATIENT
Start: 2025-03-18 | End: 2025-03-26 | Stop reason: HOSPADM

## 2025-03-18 RX ORDER — PREDNISONE 5 MG/1
5 TABLET ORAL DAILY
Status: DISCONTINUED | OUTPATIENT
Start: 2025-03-18 | End: 2025-03-26 | Stop reason: HOSPADM

## 2025-03-18 RX ORDER — DEXTROSE 50 % IN WATER (D50W) INTRAVENOUS SYRINGE
25
Status: DISCONTINUED | OUTPATIENT
Start: 2025-03-18 | End: 2025-03-26 | Stop reason: HOSPADM

## 2025-03-18 RX ORDER — ACETAMINOPHEN 325 MG/1
975 TABLET ORAL EVERY 6 HOURS PRN
Status: DISCONTINUED | OUTPATIENT
Start: 2025-03-18 | End: 2025-03-26 | Stop reason: HOSPADM

## 2025-03-18 RX ORDER — IMIQUIMOD 12.5 MG/.25G
1 CREAM TOPICAL 3 TIMES WEEKLY
Status: DISCONTINUED | OUTPATIENT
Start: 2025-03-19 | End: 2025-03-26 | Stop reason: HOSPADM

## 2025-03-18 RX ORDER — INSULIN LISPRO 100 [IU]/ML
0-5 INJECTION, SOLUTION INTRAVENOUS; SUBCUTANEOUS
Status: DISCONTINUED | OUTPATIENT
Start: 2025-03-19 | End: 2025-03-26 | Stop reason: HOSPADM

## 2025-03-18 RX ORDER — NIFEDIPINE 30 MG/1
90 TABLET, FILM COATED, EXTENDED RELEASE ORAL 2 TIMES DAILY
Status: DISCONTINUED | OUTPATIENT
Start: 2025-03-18 | End: 2025-03-19

## 2025-03-18 RX ADMIN — ISOSORBIDE MONONITRATE 60 MG: 30 TABLET, EXTENDED RELEASE ORAL at 23:24

## 2025-03-18 RX ADMIN — CALCIUM GLUCONATE 2 G: 20 INJECTION, SOLUTION INTRAVENOUS at 17:49

## 2025-03-18 RX ADMIN — ASCORBIC ACID, THIAMINE MONONITRATE,RIBOFLAVIN, NIACINAMIDE, PYRIDOXINE HYDROCHLORIDE, FOLIC ACID, CYANOCOBALAMIN, BIOTIN, CALCIUM PANTOTHENATE, 1 CAPSULE: 100; 1.5; 1.7; 20; 10; 1; 6000; 150000; 5 CAPSULE, LIQUID FILLED ORAL at 23:24

## 2025-03-18 RX ADMIN — NIFEDIPINE 90 MG: 30 TABLET, FILM COATED, EXTENDED RELEASE ORAL at 23:24

## 2025-03-18 RX ADMIN — HEPARIN SODIUM 5000 UNITS: 5000 INJECTION, SOLUTION INTRAVENOUS; SUBCUTANEOUS at 23:24

## 2025-03-18 RX ADMIN — CARVEDILOL 37.5 MG: 12.5 TABLET, FILM COATED ORAL at 23:24

## 2025-03-18 RX ADMIN — IOHEXOL 75 ML: 350 INJECTION, SOLUTION INTRAVENOUS at 15:11

## 2025-03-18 RX ADMIN — PREDNISONE 5 MG: 5 TABLET ORAL at 23:24

## 2025-03-18 RX ADMIN — TACROLIMUS 0.5 MG: 0.5 CAPSULE ORAL at 23:24

## 2025-03-18 RX ADMIN — MORPHINE SULFATE 4 MG: 4 INJECTION INTRAVENOUS at 16:20

## 2025-03-18 ASSESSMENT — PAIN DESCRIPTION - LOCATION: LOCATION: ABDOMEN

## 2025-03-18 ASSESSMENT — PAIN SCALES - GENERAL
PAINLEVEL_OUTOF10: 0 - NO PAIN
PAINLEVEL_OUTOF10: 10 - WORST POSSIBLE PAIN

## 2025-03-18 ASSESSMENT — PAIN DESCRIPTION - ORIENTATION: ORIENTATION: LOWER

## 2025-03-18 ASSESSMENT — PAIN - FUNCTIONAL ASSESSMENT
PAIN_FUNCTIONAL_ASSESSMENT: UNABLE TO SELF-REPORT
PAIN_FUNCTIONAL_ASSESSMENT: NO/DENIES PAIN

## 2025-03-18 NOTE — ED TRIAGE NOTES
Pt reports diarrhea x 2 weeks, says he came today because everyone kept telling him to. Hx ESRD on dialysis Tues Thurs Sat, last went on Saturday.

## 2025-03-18 NOTE — PROGRESS NOTES
I received this patient during signout at 1500.   Please see previous provider's note for detailed H&P, labs and imaging.      Under my care, patient reassessed and remains clinically stable. See ED course below.    @  ED Course as of 03/18/25 2153   Tue Mar 18, 2025   1529 I assumed care of this patient at 1500.  Viral swabs are negative, lactate 0.9, CMP notable for elevated creatinine of 12.3, BUN 39, calcium 10.8, mildly elevated potassium of 5.5, will follow-up EKG, patient did miss his session of dialysis today and has a known history of ESRD, CBC with WBC count 3.9, hemoglobin 12.5, platelets 103, CBC overall appears stable from prior.  We are awaiting CT abdomen and pelvis results, stool PCR and C. difficile study [SA]   1730 EKG reviewed normal sinus rhythm rate 95, NY interval 150 ms,  ms, QTc 469 ms, T waves appear to be peaked in lead V3 and V4, left axis deviation and intraventricular block appears similar to prior, no acute ST segment elevations or depressions, when compared to 1/26/2025 this does appear similar [SA]   1732 Repeat potassium ordered to be collected after hyperK treatment with 5u insulin, calcium, dextrose and bicarbonate, patient will be admitted for persistent diarrhea, dialysis and I will discuss with admission coordinators [SA]   1728 CT abdomen pelvis w IV contrast  IMPRESSION:  1.  No evidence for bowel obstruction or abnormal colonic dilatation.  2.  Hepatosplenomegaly.  3.  Small right pleural effusion.  4.  Cardiomegaly with left ventricular enlargement.  5.  Bilateral atrophic native kidneys with a renal transplant at the  left lower quadrant.   [SA]   1737 On reassessment, patient has had 3 liquid bowel movements per RN team, given concern that patient's potassium has likely decreased in the interim we repeated a potassium level at this time and decided to give 2 g of calcium gluconate and deferred the rest of hyperkalemic treatment.  I did message Valerie Morales to  coordinate dialysis [SA]   1958 POTASSIUM(!!): 6.2  Potassium reviewed, this was resulted after patient went to dialysis which will improve his potassium, patient has a bed ready with inpatient service, he will go to inpatient service after dialysis, patient did receive 2 g of calcium gluconate prior to dialysis [SA]   2116 Patient no longer has a clean inpatient bed, will obtain VFP to evaluate potassium in ED after dialysis [SA]      ED Course User Index  [SA] Crystal Shankar DO         Diagnoses as of 03/18/25 2153   Hyperkalemia   Diarrhea, unspecified type   Dialysis complication, subsequent encounter   @    Disposition: Admitted      Patient seen and staffed with attending physician.     Crystal Shankar DO   EM PGY3

## 2025-03-18 NOTE — ED PROVIDER NOTES
CC: Diarrhea     HPI:   Patient is a 69-year-old male with past medical history of failed kidney transplant x2 on dialysis, T2DM, HTN, HCV, prostate cancer s/p prostatectomy who presents to the ED due to concerns of diarrhea for the past 2 weeks.  Patient has had 8-10 episodes of watery brown diarrhea.  He recently started Augmentin for antibiotic prophylaxis versus a recent pneumonia.  Patient has been taking his medications and since then has developed worsening diarrhea and abdominal pain.  He endorses pain over where one of his old transplant sites are.  He is significantly tender over his left lower quadrant with guarding.  Patient has also had a few episodes of emesis and caregiver notes that symptoms got worse over the past day.  He felt too weak to go to dialysis but did get a full run of dialysis on Saturday.  He has not had any fevers or chills but is slightly diaphoretic on initial evaluation.  Patient denies any chest pain or shortness of breath.    Limitations to History: pain  Additional History Obtained from: caregiver    PMHx/PSHx:  Per HPI.   - has a past medical history of Anemia, Arthritis, Cataract, Chronic kidney disease, stage 3 unspecified (Multi) (09/26/2018), CKD (chronic kidney disease), Cough (02/12/2024), COVID-19 (06/18/2020), Diabetes (Multi), ESRD (end stage renal disease) (Multi), Focal and segmental proliferative glomerulonephritis (12/23/2023), HTN (hypertension), Hyperlipidemia, Other long term (current) drug therapy (07/20/2021), Personal history of other diseases of the circulatory system, Personal history of other infectious and parasitic diseases (08/17/2015), Polyp, colonic (08/17/2023), Primary osteoarthritis of both ankles (08/17/2023), Prostate cancer (Multi), Tubular adenoma of colon (08/17/2023), and Unspecified kidney failure (08/17/2016).  - has a past surgical history that includes Prostatectomy (10/11/2013); Ileostomy (04/25/2017); Other surgical history (04/21/2017);  Ileostomy closure (08/17/2015); Other surgical history (08/17/2015); Other surgical history (08/17/2015); US guided percutaneous peritoneal or retroperitoneal fluid collection drainage (10/20/2022); transplant, kidney, open (1992); transplant, kidney, open (2013); and US guided percutaneous biopsy renal left (Left, 11/20/2023).    Social History:  - Tobacco:  reports that he has never smoked. He has been exposed to tobacco smoke. He has never used smokeless tobacco.   - Alcohol:  reports no history of alcohol use.   - Drugs:  Drug: Oxycodone.     Medications: Reviewed in EMR.     Allergies:  Patient has no known allergies.    ???????????????????????????????????????????????????????????????  Triage Vitals:  T 37 °C (98.6 °F)  HR 97  /90  RR 16  O2 98 % None (Room air)    Physical Exam  Vitals and nursing note reviewed.   Constitutional:       General: He is not in acute distress.     Appearance: He is well-developed. He is diaphoretic.      Comments: cachetic   HENT:      Head: Normocephalic and atraumatic.   Eyes:      Conjunctiva/sclera: Conjunctivae normal.   Cardiovascular:      Rate and Rhythm: Normal rate and regular rhythm.      Heart sounds: No murmur heard.  Pulmonary:      Effort: Pulmonary effort is normal. No respiratory distress.      Breath sounds: Normal breath sounds.   Abdominal:      General: Abdomen is flat.      Palpations: Abdomen is soft.      Tenderness: There is abdominal tenderness. There is guarding (LLQ). There is no right CVA tenderness, left CVA tenderness or rebound.      Hernia: No hernia is present.      Comments: Postoperative surgical scars that are clean dry and intact without any signs of obvious cellulitis noted on the abdomen   Musculoskeletal:         General: No swelling or tenderness.      Cervical back: Neck supple.   Skin:     General: Skin is warm.      Capillary Refill: Capillary refill takes 2 to 3 seconds.      ???????????????????????????????????????????????????????????????  EKG (per my interpretation): See ED course    ED Course  ED Course as of 03/19/25 1452   Tue Mar 18, 2025   1529 I assumed care of this patient at 1500.  Viral swabs are negative, lactate 0.9, CMP notable for elevated creatinine of 12.3, BUN 39, calcium 10.8, mildly elevated potassium of 5.5, will follow-up EKG, patient did miss his session of dialysis today and has a known history of ESRD, CBC with WBC count 3.9, hemoglobin 12.5, platelets 103, CBC overall appears stable from prior.  We are awaiting CT abdomen and pelvis results, stool PCR and C. difficile study [SA]   1730 EKG reviewed normal sinus rhythm rate 95, ID interval 150 ms,  ms, QTc 469 ms, T waves appear to be peaked in lead V3 and V4, left axis deviation and intraventricular block appears similar to prior, no acute ST segment elevations or depressions, when compared to 1/26/2025 this does appear similar [SA]   1732 Repeat potassium ordered to be collected after hyperK treatment with 5u insulin, calcium, dextrose and bicarbonate, patient will be admitted for persistent diarrhea, dialysis and I will discuss with admission coordinators [SA]   1735 CT abdomen pelvis w IV contrast  IMPRESSION:  1.  No evidence for bowel obstruction or abnormal colonic dilatation.  2.  Hepatosplenomegaly.  3.  Small right pleural effusion.  4.  Cardiomegaly with left ventricular enlargement.  5.  Bilateral atrophic native kidneys with a renal transplant at the  left lower quadrant.   [SA]   1737 On reassessment, patient has had 3 liquid bowel movements per RN team, given concern that patient's potassium has likely decreased in the interim we repeated a potassium level at this time and decided to give 2 g of calcium gluconate and deferred the rest of hyperkalemic treatment.  I did message Valerie Morales to coordinate dialysis [SA]   1958 POTASSIUM(!!): 6.2  Potassium reviewed, this was resulted after  patient went to dialysis which will improve his potassium, patient has a bed ready with inpatient service, he will go to inpatient service after dialysis, patient did receive 2 g of calcium gluconate prior to dialysis [SA]   2116 Patient no longer has a clean inpatient bed, will obtain VFP to evaluate potassium in ED after dialysis [SA]      ED Course User Index  [SA] Crystal Shankar DO         Diagnoses as of 03/19/25 1452   Hyperkalemia   Diarrhea, unspecified type   Dialysis complication, subsequent encounter       Medical Decision Making:  Patient is a 69-year-old male with past medical history of failed kidney transplant x2 on dialysis, T2DM, HTN, HCV, prostate cancer s/p prostatectomy who presents to the ED due to concerns of diarrhea for the past 2 weeks.  Differentials considered but not limited to C. difficile, colitis, bowel obstruction, volvulus, bowel perforation, intra-abdominal abscess.    Based on the patient's history and physical exam broad lab work was initiated.  Patient is mildly hyperkalemic likely consistent with missed dialysis session.  EKG is not indicative of peaked T waves.  Patient does have air fluids noted on KUB with negative viral swabs.  In the setting of leukopenia that is baseline and air-fluid levels noted on his x-ray, will obtain CT of the abdomen pelvis to further evaluate for acute obstruction.  Patient will likely need admission, coordination for dialysis as well as possible surgical consult.  Tacrolimus level was added on in the setting of patient being unable to keep down most of his medications.  He does not have an obvious metabolic derangement.  Patient care was handed off to oncoming ED provider pending CT results.  Care was overseen by attending physician agrees to the plan disposition.    External records reviewed: recent inpatient, clinic, and prior ED notes  Diagnostic imaging independently reviewed/interpreted by me (as reflected in MDM) includes: KUB< CT ab  pel  Social Determinants Affecting Care: None identified  Discussion of management with other providers: attending  Prescription Drug Consideration: per inpatient team  Escalation of Care:  handoff    Impression:   Abdominal Pain  Diarrhea    Disposition: Handoff      Procedures ? SmartLinks last updated 3/18/2025 2:53 PM        Kindra Xiong MD  Resident  03/19/25 0759

## 2025-03-18 NOTE — ED TRIAGE NOTES
Emergency Department Encounter  [facility] EMERGENCY MEDICINE    Patient: Manolo Bashir  MRN: [id number]  : 1955  Date of Evaluation: 3/18/2025  Triage Provider: Osei Foote PA-C      Chief Complaint       Chief Complaint   Patient presents with    Diarrhea       HPI       Manolo Bashir is a 69 y.o. male with PmHx of ESRD from hypertension s/p 2 kidney transplants ( and ) that failed in may 2024 now on HD (//S) through LUE AVG), MGUS, T2DM, HTN, prostate cancer s/p prostatectomy, urine incontinence  who presents to the emergency department complaining of Diarrhea x 2 weeks.  Diarrhea today caused him to miss his dialysis this morning.  Diarrhea is daily, watery, several episodes per day.  Has vomiting daily.  Complaining of diffuse abdominal pain with it.  Patient and family feel that diarrhea most likely is from the Augmentin started at discharge 3/6.  Appears Augmentin was started due to infection of unknown etiology.  Was previously on long-term doxycycline and levofloxacin, broadened during admission and changed to Augmentin at discharge.  States diarrhea is nonbloody.  Vomiting is nonbloody.  Outside of today has been able to go to his dialysis as scheduled.      Past History     Past Medical History:   Diagnosis Date    Anemia     Arthritis     Cataract     Chronic kidney disease, stage 3 unspecified (Multi) 2018    Stage 3 chronic kidney disease    CKD (chronic kidney disease)     stage V    Cough 2024    COVID-19 2020    COVID-19 virus infection    Diabetes (Multi)     ESRD (end stage renal disease) (Multi)     Focal and segmental proliferative glomerulonephritis 2023    HTN (hypertension)     Hyperlipidemia     Other long term (current) drug therapy 2021    High risk medication use    Personal history of other diseases of the circulatory system     Personal history of cardiac murmur    Personal history of other infectious and parasitic  diseases 08/17/2015    History of hepatitis    Polyp, colonic 08/17/2023    Primary osteoarthritis of both ankles 08/17/2023    Prostate cancer (Multi)     Tubular adenoma of colon 08/17/2023    Unspecified kidney failure 08/17/2016    Renal failure     Past Surgical History:   Procedure Laterality Date    ILEOSTOMY  04/25/2017    Ileostomy    ILEOSTOMY CLOSURE  08/17/2015    Ileostomy Closure    OTHER SURGICAL HISTORY  04/21/2017    Right Hemicolectomy    OTHER SURGICAL HISTORY  08/17/2015    Arteriovenous Surgery Creation Of A-V Fistula    OTHER SURGICAL HISTORY  08/17/2015    Sigmoidoscopy (Fiberoptic, Therapeutic )    PROSTATECTOMY  10/11/2013    Prostatectomy Radical    TRANSPLANT, KIDNEY, OPEN  1992    TRANSPLANT, KIDNEY, OPEN  2013    US GUIDED PERCUTANEOUS BIOPSY RENAL LEFT Left 11/20/2023    US GUIDED PERCUTANEOUS BIOPSY RENAL LEFT 11/20/2023 Lou [facility] MD [facility]    US GUIDED PERCUTANEOUS PERITONEAL OR RETROPERITONEAL FLUID COLLECTION DRAINAGE  10/20/2022    US GUIDED PERCUTANEOUS PERITONEAL OR RETROPERITONEAL FLUID COLLECTION DRAINAGE 10/20/2022 [facility] CLINICAL LEGACY     Social History     Socioeconomic History    Marital status: Single   Tobacco Use    Smoking status: Never     Passive exposure: Past    Smokeless tobacco: Never   Vaping Use    Vaping status: Never Used   Substance and Sexual Activity    Alcohol use: Never    Sexual activity: Defer     Social Drivers of Health     Financial Resource Strain: Low Risk  (3/4/2025)    Overall Financial Resource Strain (CARDIA)     Difficulty of Paying Living Expenses: Not hard at all   Food Insecurity: No Food Insecurity (3/1/2025)    Hunger Vital Sign     Worried About Running Out of Food in the Last Year: Never true     Ran Out of Food in the Last Year: Never true   Transportation Needs: No Transportation Needs (3/4/2025)    PRAPARE - Transportation     Lack of Transportation (Medical): No     Lack of Transportation (Non-Medical): No   Physical Activity:  Sufficiently Active (1/5/2025)    Exercise Vital Sign     Days of Exercise per Week: 7 days     Minutes of Exercise per Session: 60 min   Stress: No Stress Concern Present (3/1/2025)    [employer] - Occupational Stress Questionnaire     Feeling of Stress : Not at all   Social Connections: Socially Isolated (1/5/2025)    Social Connection and Isolation Panel [NHANES]     Frequency of Communication with Friends and Family: More than three times a week     Frequency of Social Gatherings with Friends and Family: More than three times a week     Attends Orthodoxy Services: Never     Active Member of Clubs or Organizations: No     Attends Club or Organization Meetings: Never     Marital Status: Never    Intimate Partner Violence: Not At Risk (3/1/2025)    Humiliation, Afraid, Rape, and Kick questionnaire     Fear of Current or Ex-Partner: No     Emotionally Abused: No     Physically Abused: No     Sexually Abused: No   Housing Stability: Low Risk  (3/4/2025)    Housing Stability Vital Sign     Unable to Pay for Housing in the Last Year: No     Number of Times Moved in the Last Year: 0     Homeless in the Last Year: No   Recent Concern: Housing Stability - High Risk (3/1/2025)    Housing Stability Vital Sign     Unable to Pay for Housing in the Last Year: Yes     Number of Times Moved in the Last Year: 0     Homeless in the Last Year: No       Medications/Allergies     Previous Medications    ACETAMINOPHEN (TYLENOL) 325 MG TABLET    Take 3 tablets (975 mg) by mouth every 6 hours if needed for mild pain (1 - 3).    AMOXICILLIN-POT CLAVULANATE (AUGMENTIN) 500-125 MG TABLET    Take 1 tablet by mouth once every 24 hours for 42 doses. Take at night, after dialysis.    BENZONATATE (TESSALON) 100 MG CAPSULE    Take 1 capsule (100 mg) by mouth 3 times a day as needed for cough. Do not crush or chew.    CARVEDILOL (COREG) 12.5 MG TABLET    Take 3 tablets (37.5 mg) by mouth 2 times a day. Hold  for systolic BP <140 and HR <60. PATIENT NEEDS TO FOLLOW UP WITH CARDIOLOGY    CINACALCET (SENSIPAR) 30 MG TABLET    Take 1 tablet (30 mg) by mouth once daily.    CLOTRIMAZOLE (LOTRIMIN) 1 % CREAM    Apply topically 2 times a day.    EASY TOUCH ALCOHOL PREP PADS PADS, MEDICATED        EPOETIN EUSEBIA 10,000 UNIT/ML INJECTION    Inject 1 mL (10,000 Units) under the skin every 7 days. Do not start before April 17, 2024.    FLUOCINONIDE (LIDEX) 0.05 % OINTMENT    Apply to affected areas twice daily when active as needed. Use less than 14 days per month.    GLUCAGON (GVOKE HYPOPEN 1-PACK) 1 MG/0.2 ML AUTO-INJECTOR    Inject 1 mg into the muscle every 15 minutes if needed (For blood glucose 41 to 70 mg/dL and no IV access).    IMIQUIMOD (ALDARA) 5 % CREAM    Apply 1 packet topically 3 times a week.    INSULIN GLARGINE (LANTUS) 100 UNIT/ML (3 ML) PEN    Inject 6 Units under the skin once daily in the morning. Inject 6 units daily as directed, if glucose is above 150 increase by 2 units to a total of 8 units    INSULIN LISPRO (HUMALOG) 100 UNIT/ML PEN    Inject 0-5 Units under the skin 3 times a day before meals. Take as directed per insulin instructions.    ISAVUCONAZONIUM SULFATE (CRESEMBA) 186 MG CAPSULE CAPSULE    Take 2 capsules (372 mg) by mouth once daily. Take 2 capsules 3/6 at 10pm and 2 capsules 3/7 6am, then starting 3/8 take 2 capsules once a day    ISOSORBIDE MONONITRATE ER (IMDUR) 60 MG 24 HR TABLET    Take 1 tablet (60 mg) by mouth once daily.    KETOROLAC (ACULAR) 0.5 % OPHTHALMIC SOLUTION    Instill 1 drop into left eye three times a day FOR USE AFTER CATARACT SURGERY    LANCETS (UNILET SUPER THIN LANCETS) 30 GAUGE MISC    USE TO TEST BLOOD SUGAR THREE TIMES A DAY    LANCING DEVICE MISC    use as directed    METOCLOPRAMIDE (REGLAN) 5 MG TABLET    Take 1 tablet (5 mg) by mouth 3 times a day before meals.    NEOMYCIN-POLYMYXIN-DEXAMETHASONE (MAXITROL) 3.5MG/ML-10,000 UNIT/ML-0.1 % OPHTHALMIC SUSPENSION     "Administer 2 drops into the left eye once daily in the morning.    NIFEDIPINE ER (ADALAT CC) 90 MG 24 HR TABLET    Take 1 tablet (90 mg) by mouth 2 times a day. Do not crush, chew, or split.    PANTOPRAZOLE (PROTONIX) 40 MG EC TABLET    Take 1 tablet (40 mg) by mouth once daily in the morning. Take before meals. Do not crush, chew, or split. Do not start before January 22, 2024.    PEN NEEDLE, DIABETIC (TECHLITE PEN NEEDLE) 32 GAUGE X 5/32\" NEEDLE    Use 4 a day as directed    PRAVASTATIN (PRAVACHOL) 10 MG TABLET    Take 1 tablet (10 mg) by mouth once daily at bedtime.    PREDNISONE (DELTASONE) 5 MG TABLET    Take 1 tablet (5 mg) by mouth once daily.    SEVELAMER CARBONATE (RENVELA) 800 MG TABLET    Take 1 tablet (800 mg) by mouth 3 times a day before meals. Swallow tablet whole; do not crush, break, or chew.    SODIUM CHLORIDE (OCEAN) 0.65 % NASAL SPRAY    Administer 1 spray into each nostril 4 times a day as needed for congestion.    SYRINGE WITH NEEDLE 3 ML 25 X 5/8\" SYRINGE    Use to inject epogen.    TACROLIMUS (PROGRAF) 0.5 MG CAPSULE    Take 1 capsule (0.5 mg) by mouth 2 times a day.    TACROLIMUS (PROTOPIC) 0.1 % OINTMENT    Apply topically 2 times a day.    UNILET SUPER THIN LANCETS 30 GAUGE MISC    1 each 3 times a day.    VITAMIN B COMPLEX-VITAMIN C-FOLIC ACID (NEPHROCAPS) 1 MG CAPSULE    Take 1 capsule by mouth once daily.     No Known Allergies     Physical Exam       ED Triage Vitals [03/18/25 1111]   Temperature Heart Rate Respirations BP   37 °C (98.6 °F) 97 16 162/90      Pulse Ox Temp Source Heart Rate Source Patient Position   98 % Temporal -- --      BP Location FiO2 (%)     -- --         Focused PE    GENERAL:  Vital signs as documented.   PULMONARY:  CTABL, no respiratory distress. Able to speak full sentences, no accessory muscle use  CARDIAC:   Normal rate.   ABDOMEN:  Exam is limited by patient positioning in triage, soft, non distended, diffusely tender, ventral scar present, NABS, No " guarding, no peritoneal signs,   MUSCULOSKELETAL:   No obvious deformities.   SKIN:   Good color, with no significant rashes.  No pallor.  NEURO:  Alert and oriented, speech clear and coherent    Plan     CBC, CMP, mag, Phos, lipase, lactate, COVID/flu swab, chest x-ray, EKG      For the remainder of the patient's workup and ED course, please see the main ED provider note.  We discussed need for diagnostic testing including lab studies and imaging.  We also discussed that they may be asked to wait in the waiting room while these test are pending.  They understand that if they choose to leave without having the testing completed or resulted that we cannot rule out acute life-threatening illnesses and the risks involved to lead to worsening condition, permanent disability or even death.        Comment: Please note this report has been produced using speech recognition software and may contain errors related to that system including errors in grammar, punctuation, and spelling, as well as words and phrases that may be inappropriate.  If there are any questions or concerns please feel free to contact the dictating provider for clarification.    Osei Foote PA-C

## 2025-03-18 NOTE — H&P
History and Physical   This is a 69 year old male with a past medical history of ESRD s/p renal transplants x2, non-functioning: initially in 1992 c/b allograft failure 2006, 2nd transplant 2013, c/b impaired allograft function, currently on iHD since May 2024(TTS); on tacrolimus and prednisone 5mg, MGUS, DMII, hypertension, prostate cancer s/p radical prostatectomy, HCV (treated w/ Harvoni, HCV PCR negative 2019), recent history of C. diff (treated w/ vancomycin PO 10 day course EOT 2/6/25), as well as development of graft intolerance over past 2 months, currently being evaluated for nephrectomy due to persistent graft related left lower quadrant pain, and gastroparesiswho presented to [facility] with a chief complaint of missed dialysis due to 2-3 weeks of on going diarrhea following recent discharge. He is admitted to Medicine for further workup and management.     On evaluation, patient recently received morphine and is somnolent and a poor/unreliable historian. History obtained via patient and SO at bedside. On interview, patient reports that he was recommended to discharge to SNF following mot recent discharge but elected for dispo to home. He reports that he intervally developed diarrhea ~2-3 weeks ago and attributes this diarrhea to the initiation of PO abx during his last hospitalization. He reports 8-10 watery/loose diarrhea episodes daily. He denies any further infectious sx including fevers/chills, NS, and myalgias. He does not fatigue. He deneis any recent travel, new rashes, sick contact, or changes in diet. He denies joint pain and new supplementation. He reports chronic LLQ abdominal pain. He reports that he has had nausea and emesis, and has been unable to tolerate PO intake since this AM. He endorses several lb weight loss and lethargy. He reports his last HD session was 03/15. He denies a history of pancreatitis and alchol abuse. He denies hot/cold intolerance.     Per chart review, has had  several recent admissions here at [facility] since 01/2025:    01/05/08/2025- presented with LLQ abdominal pain and hematuria. CT abdomen pelvis with fat stranding adjacent to the left iliac fossa renal transplant concerning for pyelonephritis, no evidence of any perinephric fluid collection was noted. Evalauted by Transplant Nephrology who rec Solumedrol 125 mg/day x 3 days (1/6-1/8/25) then prednisone 40 mg/day x 5 days then 20 mg/day x 5 days then 10 mg/day x 5 days then keep at prednisone 5 mg/day. Patient continued on TAC 0.5 mg.  Also treated with empiric Abx d/t c/f Pyelonephritis, but discontinued after 3 days given reassuring workup.   01/27-02/05- presented with SOB, nausea/vomiting, diarrhea, and general malaise that started during dialysis. Noted to be febrile and hypoxic on presentation with workup concerning for FO vs PNA managed with iHD and IV abx--> Levaquin. Found to have positive C. Diff PCR so oral vancomycin was started for 10 days total. CT A/P was obtained d/t persistent LLQ ab pain which was concerning for edema of the transplanted kidney. Transplant surgery was consulted for potential nephrectomy. ID was also consulted due to concern for pneumonia/colitis in an immunocompromised patient however they were less concerned for pneumonia given improvement in his CXR so pneumonia coverage was discontinued. Ultimately managed with prednisone taper and patient's abdominal pain significantly improved. His diarrhea also improved with oral vancomycin.  02/28-03/06- presented with chest pain, found to be fluid ocerloaded, but also with concern for oppurtunistic infection given immunocompromised state. Evaluated by Pulmonary Medicine c/d d/t GGO and c/f atypical infection who deferred thoracentesis as effusions improved with HD and bronchoscopy deferred d/t low likelihood of infection. Evalauated by ID who recommended isavuconazole (3 month course) and augmentin for 6 week course due to c/f atypical PNA. He was  evaluated by transplant surgery but did not have inpatient Nephrectomy.       In the ED:    - Vitals:  T 98.6, HR 97, /90,  RR 16, SpO2 98% on RA  - Labs:  CBC: WBC  3.9, Hgb 12.5, plt 103  BMP: Na 135, K 5.5, Cl 102, HCO3 21, BUN 39, Cr 12.31, glu 117  LFT: Ca 10.8, tprot 7.8, alb 3.8, alkphos 70, AST 19, ALT 14, tbili 0.5  Electrolytes: PO4 2.2, Mg 2.11  lactate 0.9, Lipase 15   VB.38/37/57/83% L 1.0  COVID/FLU/RSV: Negative  Tacro <2.0  Stool pathogen panel, C diff- in process     - Imaging:  CXR/KUB:  Non acute CXR, nonobstructive bowel gas pattern     CT A/P w/ IV contrast:  IMPRESSION:  1.  No evidence for bowel obstruction or abnormal colonic dilatation.  2.  Hepatosplenomegaly.  3.  Small right pleural effusion.  4.  Cardiomegaly with left ventricular enlargement.  5.  Bilateral atrophic native kidneys with a renal transplant at the  left lower quadrant.    - ECG:  No ECG obtained     In the ED, he was given 2 g of IV calcium gluconate and 4 mg IV Morphine. He is admitted to Medicine for further management.       GI Hx:  - Colonoscopy:  2023:  Impression:            - Decreased sphincter tone found on digital rectal                          exam.                         - The examined portion of the ileum was normal.                         - Patent side-to-side ileo-colonic anastomosis,                          characterized by healthy appearing mucosa.                         - Diverticulosis in the sigmoid colon, in the                          descending colon and in the transverse colon. There                          was no evidence of diverticular bleeding.                         - Non-bleeding external hemorrhoids.                         - No specimens collected.    PMH:   As above      SurgHx:   Past Surgical History:   Procedure Laterality Date    ILEOSTOMY  2017    Ileostomy    ILEOSTOMY CLOSURE  2015    Ileostomy Closure    OTHER SURGICAL HISTORY  2017    Right  Hemicolectomy    OTHER SURGICAL HISTORY  08/17/2015    Arteriovenous Surgery Creation Of A-V Fistula    OTHER SURGICAL HISTORY  08/17/2015    Sigmoidoscopy (Fiberoptic, Therapeutic )    PROSTATECTOMY  10/11/2013    Prostatectomy Radical    TRANSPLANT, KIDNEY, OPEN  1992    TRANSPLANT, KIDNEY, OPEN  2013    US GUIDED PERCUTANEOUS BIOPSY RENAL LEFT Left 11/20/2023    US GUIDED PERCUTANEOUS BIOPSY RENAL LEFT 11/20/2023 Lou Rodgers MD [facility]    US GUIDED PERCUTANEOUS PERITONEAL OR RETROPERITONEAL FLUID COLLECTION DRAINAGE  10/20/2022    US GUIDED PERCUTANEOUS PERITONEAL OR RETROPERITONEAL FLUID COLLECTION DRAINAGE 10/20/2022 [facility] CLINICAL LEGACY         Allergies:   No Known Allergies      SocHx:   Lives: in home with SO in [place]   Works: disabled   ADLs: reportedly independent   Meds/$: SO manages   Tobacco/ Alcohol/ Drugs --> denies all     Home Medications:  Current Outpatient Medications   Medication Instructions    acetaminophen (Tylenol) 325 mg tablet 3 tablets, oral, Every 6 hours PRN    amoxicillin-pot clavulanate (Augmentin) 500-125 mg tablet 1 tablet, oral, Every 24 hours scheduled, Take at night, after dialysis.    benzonatate (TESSALON) 100 mg, oral, 3 times daily PRN, Do not crush or chew.    carvedilol (COREG) 37.5 mg, oral, 2 times daily, Hold for systolic BP <140 and HR <60. PATIENT NEEDS TO FOLLOW UP WITH CARDIOLOGY    cinacalcet (SENSIPAR) 30 mg, oral, Daily    clotrimazole (Lotrimin) 1 % cream Topical, 2 times daily    Cresemba 372 mg, oral, Daily, Take 2 capsules 3/6 at 10pm and 2 capsules 3/7 6am, then starting 3/8 take 2 capsules once a day    Easy Touch Alcohol Prep Pads pads, medicated     epoetin aida 10,000 unit/mL injection Inject 1 mL (10,000 Units) under the skin every 7 days. Do not start before April 17, 2024.    fluocinonide (Lidex) 0.05 % ointment Apply to affected areas twice daily when active as needed. Use less than 14 days per month.    Gvoke HypoPen 1-Pack 1 mg, intramuscular, Every  "15 min PRN    imiquimod (Aldara) 5 % cream 1 packet, Topical, 3 times weekly    insulin lispro (HUMALOG) 0-5 Units, subcutaneous, 3 times daily before meals, Take as directed per insulin instructions.    isosorbide mononitrate ER (IMDUR) 60 mg, oral, Daily    ketorolac (Acular) 0.5 % ophthalmic solution Instill 1 drop into left eye three times a day FOR USE AFTER CATARACT SURGERY    lancets (Unilet Super Thin Lancets) 30 gauge misc 3 times daily    lancing device misc use as directed    Lantus Solostar U-100 Insulin 6 Units, subcutaneous, Every morning, Inject 6 units daily as directed, if glucose is above 150 increase by 2 units to a total of 8 units    metoclopramide (REGLAN) 5 mg, oral, 3 times daily before meals    neomycin-polymyxin-dexAMETHasone (Maxitrol) 3.5mg/mL-10,000 unit/mL-0.1 % ophthalmic suspension 2 drops, Left Eye, Every morning    NIFEdipine ER (ADALAT CC) 90 mg, oral, 2 times daily, Do not crush, chew, or split.    pantoprazole (ProtoNix) 40 mg EC tablet Take 1 tablet (40 mg) by mouth once daily in the morning. Take before meals. Do not crush, chew, or split. Do not start before January 22, 2024.    pen needle, diabetic (TechLITE Pen Needle) 32 gauge x 5/32\" needle Use 4 a day as directed    pravastatin (PRAVACHOL) 10 mg, oral, Nightly    predniSONE (DELTASONE) 5 mg, oral, Daily    sevelamer carbonate (RENVELA) 800 mg, oral, 3 times daily before meals, Swallow tablet whole; do not crush, break, or chew.    sodium chloride (Ocean) 0.65 % nasal spray 1 spray, Each Nostril, 4 times daily PRN    syringe with needle 3 mL 25 x 5/8\" syringe Use to inject epogen.    tacrolimus (PROGRAF) 0.5 mg, oral, 2 times daily    tacrolimus (Protopic) 0.1 % ointment Topical, 2 times daily    Unilet Super Thin Lancets 30 gauge misc 1 each, miscellaneous, 3 times daily    vitamin B complex-vitamin C-folic acid (Nephrocaps) 1 mg capsule 1 capsule, oral, Daily        Objective:    24 Hour Vitals  Temperature:  [37 °C " (98.6 °F)] 37 °C (98.6 °F)  Heart Rate:  [93-97] 93  Respirations:  [11-16] 11  BP: (152-162)/(83-91) 152/91  FiO2 (%):  [21 %] 21 %    Temp (24hrs), Av °C (98.6 °F), Min:37 °C (98.6 °F), Max:37 °C (98.6 °F)     24 hour Intake/Output    Intake/Output Summary (Last 24 hours) at 3/18/2025 1757  Last data filed at 3/18/2025 1749  Gross per 24 hour   Intake 100 ml   Output --   Net 100 ml        Exam:  General: Resting in bed. Ill appearing, somnolent. Appears stated age. In no acute distress   HEENT: Normocephalic and atraumatic. EOMI, sclera non-icteric, MMM, no JVD  Cardiovascular: RRR, no r/m/g  Pulmonary: Lungs clear to auscultation bilaterally. No wheezes/ rhonchi/ rales   GI: +BS, soft, tender to palpation in LLQ, non-distended  : No suprapubic/ flank pain  Extremities: No LE edema   Skin: warm and dry. No rashes or lesions   Neurologic: CN II-XII grossly intact.    Labs:  CBC  Results from last 72 hours   Lab Units 25  1138   WBC AUTO x10*3/uL 3.9*   HEMOGLOBIN g/dL 12.5*   HEMATOCRIT % 36.7*   PLATELETS AUTO x10*3/uL 103*        BMP  Results from last 72 hours   Lab Units 25  1138   SODIUM mmol/L 135*   POTASSIUM mmol/L 5.5*   CHLORIDE mmol/L 102   BUN mg/dL 39*   CREATININE mg/dL 12.31*   MAGNESIUM mg/dL 2.11   PHOSPHORUS mg/dL 2.2*       Medications   Scheduled Medications  calcium gluconate, 2 g, intravenous, Once  dextrose, 25 g, intravenous, Once  insulin regular, 5 Units, intravenous, Once  sodium bicarbonate, 50 mEq, intravenous, Once     Continuous Medications    PRN Medications         Assessment/Plan:  This is a 69 year old male with a past medical history of ESRD s/p renal transplants x2, non-functioning: initially in  c/b allograft failure , 2nd transplant , c/b impaired allograft function, currently on iHD since May 2024(TTS); on tacrolimus and prednisone 5mg, MGUS, DMII, hypertension, prostate cancer s/p radical prostatectomy, HCV (treated w/ Aries, HCV PCR negative  2019), recent history of C. diff (treated w/ vancomycin PO 10 day course EOT 2/6/25), as well as development of graft intolerance over past 2 months, currently being evaluated for nephrectomy due to persistent graft related left lower quadrant pain, and gastroparesiswho presented to [facility] with a chief complaint of missed dialysis due to 2-3 weeks of on going diarrhea following recent discharge. He is admitted to Medicine for further workup and management.     On initial evaluation, patient is afebrile, hypertensive, with O2 saturations appropriate on room air. ED workup reviewed and notable for stable pancytopenia, elevated K (5.5->6.2), and negative Cdif testing. CT A/P with IV contrast is without evidence of acute bowel or renal pathology. No ECG has been obtained. He has been managed with hyperK cocktail and is pending iHD. Overall, unclear etiology of acute diarrhea. Will send workup and start anti-diarrheals once infectious etiologies are ruled out. Will plan to touch base with ID this admission given continuation of long term Abx, but holding off for now. Will touch base with Nephrology for management of anti-rejection medications and consideration of Nephrectomy eval while in house. Will obtain ECG and follow up Qtc, following review will start anti-emetics. CLD for now, advance as tolerated.     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   - Nehrpology consulted for iHD and recommendations regarding potential Nephrotomy and immunosuppression   - c/w Pred and tacro for now  - daily AM tacro level      #Diarrhea   #Hx of C dif   #C/f oppurtunistic respiratory infection   :: Cdif PCR positive, C dif EIA negative, stool pathogen panel negative, stool O/P negative (01/2025)  :: s/p 10 days PO Vanc (01-02/2025)  :: Cdif PCR negative in the ED  :: CT A/P without evidence of  acute colonic pathology   :: Histoplasma antigen negative, BD-glucan negative, aspergillus negative, blastomyces abx negative, T spot negative (02-03/2025)  Plan:  - send ESR/CRP, HIV, CMV, acute hepatitis panel, fecal fat/pancreatic elastase, fecal calprotectin/lactoferrin, O+P  - Plan to start anti-diarrheals pending above workup   - repeat Procal   - Hold augmentin and isavuconazole for now, d/w ID in the AM regarding need for long term ABX     #HTN  - c/w home meds: coreg 37.5 mg BID, nifedipine ER 90 mg BID, Imdur 60 mg every day     #HLD   - c/w home pravastatin      #TIIDM   :: A21C 9.1 (12/2024)  :: Home reg- Lantus 6 units qam, SSI   Plan:  - on CLD   - Lantus 3 units every day, SSI   - POC Glucose AC at bedtime   - titrate to -180     #Gastroparesis   - hold home metoclopramide 5 mg TID for now, follow up ECG for QTc    F: PRN for euvolemia; caustion ESRD  E: PRN K>4, Mg>2, P>3   N: CLD, advance as tolerated   A: PIV, LUE AVF    Oxygen: RA  Abx: -  Gtts: -    DVT ppx: Heparin 5000 units q8H   GI ppx: home PPI  Bowel reg: holding given presentation for diarrhea   Anti-diarrheals: holding for now, follow up C-dif/stool pathogen panel  Anti-Emitcs:      Code Status: FULL (confirmed on ADMIT)  Surrogate Medical Decision-maker:    Kristen Bashir (daughter) [phone]

## 2025-03-19 ENCOUNTER — APPOINTMENT (OUTPATIENT)
Dept: CARDIOLOGY | Facility: HOSPITAL | Age: 70
DRG: 640 | End: 2025-03-19
Payer: MEDICARE

## 2025-03-19 ENCOUNTER — APPOINTMENT (OUTPATIENT)
Dept: VASCULAR MEDICINE | Facility: HOSPITAL | Age: 70
DRG: 640 | End: 2025-03-19
Payer: MEDICARE

## 2025-03-19 ENCOUNTER — APPOINTMENT (OUTPATIENT)
Dept: DIALYSIS | Facility: HOSPITAL | Age: 70
End: 2025-03-19
Payer: MEDICARE

## 2025-03-19 ENCOUNTER — APPOINTMENT (OUTPATIENT)
Dept: PRIMARY CARE | Facility: CLINIC | Age: 70
End: 2025-03-19
Payer: MEDICARE

## 2025-03-19 ENCOUNTER — CLINICAL SUPPORT (OUTPATIENT)
Dept: EMERGENCY MEDICINE | Facility: HOSPITAL | Age: 70
DRG: 640 | End: 2025-03-19
Payer: MEDICARE

## 2025-03-19 LAB
ABO GROUP (TYPE) IN BLOOD: NORMAL
ALBUMIN SERPL BCP-MCNC: 3.1 G/DL (ref 3.4–5)
ALBUMIN SERPL BCP-MCNC: 3.1 G/DL (ref 3.4–5)
ALBUMIN SERPL BCP-MCNC: 3.2 G/DL (ref 3.4–5)
ALBUMIN SERPL BCP-MCNC: 3.3 G/DL (ref 3.4–5)
ANION GAP BLDV CALCULATED.4IONS-SCNC: 14 MMOL/L (ref 10–25)
ANION GAP BLDV CALCULATED.4IONS-SCNC: 14 MMOL/L (ref 10–25)
ANION GAP SERPL CALC-SCNC: 17 MMOL/L (ref 10–20)
ANION GAP SERPL CALC-SCNC: 18 MMOL/L (ref 10–20)
ANION GAP SERPL CALC-SCNC: 21 MMOL/L (ref 10–20)
ANION GAP SERPL CALC-SCNC: 22 MMOL/L (ref 10–20)
ANTIBODY SCREEN: NORMAL
BASE EXCESS BLDV CALC-SCNC: -2.5 MMOL/L (ref -2–3)
BASE EXCESS BLDV CALC-SCNC: -3.4 MMOL/L (ref -2–3)
BASOPHILS # BLD AUTO: 0.03 X10*3/UL (ref 0–0.1)
BASOPHILS NFR BLD AUTO: 0.6 %
BODY TEMPERATURE: 37 DEGREES CELSIUS
BODY TEMPERATURE: 37 DEGREES CELSIUS
BUN SERPL-MCNC: 35 MG/DL (ref 6–23)
BUN SERPL-MCNC: 36 MG/DL (ref 6–23)
BUN SERPL-MCNC: 36 MG/DL (ref 6–23)
BUN SERPL-MCNC: 37 MG/DL (ref 6–23)
CA-I BLDV-SCNC: 1.29 MMOL/L (ref 1.1–1.33)
CA-I BLDV-SCNC: 1.29 MMOL/L (ref 1.1–1.33)
CALCIUM SERPL-MCNC: 9.5 MG/DL (ref 8.6–10.6)
CALCIUM SERPL-MCNC: 9.6 MG/DL (ref 8.6–10.6)
CALCIUM SERPL-MCNC: 9.7 MG/DL (ref 8.6–10.6)
CALCIUM SERPL-MCNC: 9.9 MG/DL (ref 8.6–10.6)
CARDIAC TROPONIN I PNL SERPL HS: 139 NG/L (ref 0–53)
CARDIAC TROPONIN I PNL SERPL HS: 171 NG/L (ref 0–53)
CARDIAC TROPONIN I PNL SERPL HS: 224 NG/L (ref 0–53)
CHLORIDE BLDV-SCNC: 102 MMOL/L (ref 98–107)
CHLORIDE BLDV-SCNC: 103 MMOL/L (ref 98–107)
CHLORIDE SERPL-SCNC: 101 MMOL/L (ref 98–107)
CHLORIDE SERPL-SCNC: 102 MMOL/L (ref 98–107)
CHLORIDE SERPL-SCNC: 102 MMOL/L (ref 98–107)
CHLORIDE SERPL-SCNC: 99 MMOL/L (ref 98–107)
CMV DNA SERPL NAA+PROBE-LOG IU: NORMAL {LOG_IU}/ML
CO2 SERPL-SCNC: 19 MMOL/L (ref 21–32)
CO2 SERPL-SCNC: 21 MMOL/L (ref 21–32)
CO2 SERPL-SCNC: 22 MMOL/L (ref 21–32)
CO2 SERPL-SCNC: 23 MMOL/L (ref 21–32)
CREAT SERPL-MCNC: 11.33 MG/DL (ref 0.5–1.3)
CREAT SERPL-MCNC: 11.76 MG/DL (ref 0.5–1.3)
CREAT SERPL-MCNC: 11.83 MG/DL (ref 0.5–1.3)
CREAT SERPL-MCNC: 11.89 MG/DL (ref 0.5–1.3)
EGFRCR SERPLBLD CKD-EPI 2021: 4 ML/MIN/1.73M*2
EOSINOPHIL # BLD AUTO: 0.1 X10*3/UL (ref 0–0.7)
EOSINOPHIL NFR BLD AUTO: 2.1 %
ERYTHROCYTE [DISTWIDTH] IN BLOOD BY AUTOMATED COUNT: 17.2 % (ref 11.5–14.5)
GLUCOSE BLD MANUAL STRIP-MCNC: 114 MG/DL (ref 74–99)
GLUCOSE BLD MANUAL STRIP-MCNC: 145 MG/DL (ref 74–99)
GLUCOSE BLD MANUAL STRIP-MCNC: 219 MG/DL (ref 74–99)
GLUCOSE BLD MANUAL STRIP-MCNC: 231 MG/DL (ref 74–99)
GLUCOSE BLD MANUAL STRIP-MCNC: 80 MG/DL (ref 74–99)
GLUCOSE BLD MANUAL STRIP-MCNC: 96 MG/DL (ref 74–99)
GLUCOSE BLD MANUAL STRIP-MCNC: 98 MG/DL (ref 74–99)
GLUCOSE BLDV-MCNC: 177 MG/DL (ref 74–99)
GLUCOSE BLDV-MCNC: 191 MG/DL (ref 74–99)
GLUCOSE SERPL-MCNC: 170 MG/DL (ref 74–99)
GLUCOSE SERPL-MCNC: 183 MG/DL (ref 74–99)
GLUCOSE SERPL-MCNC: 198 MG/DL (ref 74–99)
GLUCOSE SERPL-MCNC: 225 MG/DL (ref 74–99)
HCO3 BLDV-SCNC: 21.4 MMOL/L (ref 22–26)
HCO3 BLDV-SCNC: 21.7 MMOL/L (ref 22–26)
HCT VFR BLD AUTO: 36.4 % (ref 41–52)
HCT VFR BLD EST: 37 % (ref 41–52)
HCT VFR BLD EST: 38 % (ref 41–52)
HCV RNA SERPL NAA+PROBE-ACNC: NOT DETECTED K[IU]/ML
HCV RNA SERPL NAA+PROBE-LOG IU: NORMAL {LOG_IU}/ML
HGB BLD-MCNC: 12.6 G/DL (ref 13.5–17.5)
HGB BLDV-MCNC: 12.2 G/DL (ref 13.5–17.5)
HGB BLDV-MCNC: 12.7 G/DL (ref 13.5–17.5)
IMM GRANULOCYTES # BLD AUTO: 0.03 X10*3/UL (ref 0–0.7)
IMM GRANULOCYTES NFR BLD AUTO: 0.6 % (ref 0–0.9)
INHALED O2 CONCENTRATION: 21 %
LABORATORY COMMENT REPORT: NOT DETECTED
LACTATE BLDV-SCNC: 1.5 MMOL/L (ref 0.4–2)
LACTATE BLDV-SCNC: 1.5 MMOL/L (ref 0.4–2)
LYMPHOCYTES # BLD AUTO: 0.75 X10*3/UL (ref 1.2–4.8)
LYMPHOCYTES NFR BLD AUTO: 15.5 %
MAGNESIUM SERPL-MCNC: 2.04 MG/DL (ref 1.6–2.4)
MCH RBC QN AUTO: 28.8 PG (ref 26–34)
MCHC RBC AUTO-ENTMCNC: 34.6 G/DL (ref 32–36)
MCV RBC AUTO: 83 FL (ref 80–100)
MONOCYTES # BLD AUTO: 0.67 X10*3/UL (ref 0.1–1)
MONOCYTES NFR BLD AUTO: 13.8 %
NEUTROPHILS # BLD AUTO: 3.27 X10*3/UL (ref 1.2–7.7)
NEUTROPHILS NFR BLD AUTO: 67.4 %
NRBC BLD-RTO: 0 /100 WBCS (ref 0–0)
OXYHGB MFR BLDV: 82.6 % (ref 45–75)
OXYHGB MFR BLDV: 84.5 % (ref 45–75)
PCO2 BLDV: 35 MM HG (ref 41–51)
PCO2 BLDV: 37 MM HG (ref 41–51)
PH BLDV: 7.37 PH (ref 7.33–7.43)
PH BLDV: 7.4 PH (ref 7.33–7.43)
PHOSPHATE SERPL-MCNC: 3.8 MG/DL (ref 2.5–4.9)
PHOSPHATE SERPL-MCNC: 3.9 MG/DL (ref 2.5–4.9)
PHOSPHATE SERPL-MCNC: 4.1 MG/DL (ref 2.5–4.9)
PHOSPHATE SERPL-MCNC: 4.5 MG/DL (ref 2.5–4.9)
PLATELET # BLD AUTO: 107 X10*3/UL (ref 150–450)
PO2 BLDV: 55 MM HG (ref 35–45)
PO2 BLDV: 59 MM HG (ref 35–45)
POTASSIUM BLDV-SCNC: 7 MMOL/L (ref 3.5–5.3)
POTASSIUM BLDV-SCNC: 7.4 MMOL/L (ref 3.5–5.3)
POTASSIUM SERPL-SCNC: 5.7 MMOL/L (ref 3.5–5.3)
POTASSIUM SERPL-SCNC: 6.1 MMOL/L (ref 3.5–5.3)
POTASSIUM SERPL-SCNC: 6.8 MMOL/L (ref 3.5–5.3)
POTASSIUM SERPL-SCNC: 7.1 MMOL/L (ref 3.5–5.3)
RBC # BLD AUTO: 4.38 X10*6/UL (ref 4.5–5.9)
RH FACTOR (ANTIGEN D): NORMAL
SAO2 % BLDV: 85 % (ref 45–75)
SAO2 % BLDV: 87 % (ref 45–75)
SODIUM BLDV-SCNC: 130 MMOL/L (ref 136–145)
SODIUM BLDV-SCNC: 131 MMOL/L (ref 136–145)
SODIUM SERPL-SCNC: 133 MMOL/L (ref 136–145)
SODIUM SERPL-SCNC: 135 MMOL/L (ref 136–145)
SODIUM SERPL-SCNC: 136 MMOL/L (ref 136–145)
SODIUM SERPL-SCNC: 137 MMOL/L (ref 136–145)
TACROLIMUS BLD-MCNC: 2.9 NG/ML
WBC # BLD AUTO: 4.9 X10*3/UL (ref 4.4–11.3)

## 2025-03-19 PROCEDURE — 2500000005 HC RX 250 GENERAL PHARMACY W/O HCPCS

## 2025-03-19 PROCEDURE — 96375 TX/PRO/DX INJ NEW DRUG ADDON: CPT

## 2025-03-19 PROCEDURE — 93990 DOPPLER FLOW TESTING: CPT

## 2025-03-19 PROCEDURE — 82947 ASSAY GLUCOSE BLOOD QUANT: CPT

## 2025-03-19 PROCEDURE — 93005 ELECTROCARDIOGRAM TRACING: CPT

## 2025-03-19 PROCEDURE — 2500000002 HC RX 250 W HCPCS SELF ADMINISTERED DRUGS (ALT 637 FOR MEDICARE OP, ALT 636 FOR OP/ED)

## 2025-03-19 PROCEDURE — 99233 SBSQ HOSP IP/OBS HIGH 50: CPT | Performed by: INTERNAL MEDICINE

## 2025-03-19 PROCEDURE — 84484 ASSAY OF TROPONIN QUANT: CPT

## 2025-03-19 PROCEDURE — 2500000001 HC RX 250 WO HCPCS SELF ADMINISTERED DRUGS (ALT 637 FOR MEDICARE OP)

## 2025-03-19 PROCEDURE — 2500000004 HC RX 250 GENERAL PHARMACY W/ HCPCS (ALT 636 FOR OP/ED)

## 2025-03-19 PROCEDURE — 80069 RENAL FUNCTION PANEL: CPT

## 2025-03-19 PROCEDURE — 93990 DOPPLER FLOW TESTING: CPT | Performed by: INTERNAL MEDICINE

## 2025-03-19 PROCEDURE — 80197 ASSAY OF TACROLIMUS: CPT

## 2025-03-19 PROCEDURE — 83605 ASSAY OF LACTIC ACID: CPT

## 2025-03-19 PROCEDURE — 86901 BLOOD TYPING SEROLOGIC RH(D): CPT

## 2025-03-19 PROCEDURE — 96372 THER/PROPH/DIAG INJ SC/IM: CPT

## 2025-03-19 PROCEDURE — 84132 ASSAY OF SERUM POTASSIUM: CPT

## 2025-03-19 PROCEDURE — 85025 COMPLETE CBC W/AUTO DIFF WBC: CPT

## 2025-03-19 PROCEDURE — 96374 THER/PROPH/DIAG INJ IV PUSH: CPT | Mod: 59

## 2025-03-19 PROCEDURE — 93010 ELECTROCARDIOGRAM REPORT: CPT | Performed by: INTERNAL MEDICINE

## 2025-03-19 PROCEDURE — 83735 ASSAY OF MAGNESIUM: CPT

## 2025-03-19 PROCEDURE — 36415 COLL VENOUS BLD VENIPUNCTURE: CPT

## 2025-03-19 PROCEDURE — 1200000002 HC GENERAL ROOM WITH TELEMETRY DAILY

## 2025-03-19 PROCEDURE — 99291 CRITICAL CARE FIRST HOUR: CPT

## 2025-03-19 PROCEDURE — 99223 1ST HOSP IP/OBS HIGH 75: CPT | Performed by: HOSPITALIST

## 2025-03-19 PROCEDURE — 96366 THER/PROPH/DIAG IV INF ADDON: CPT

## 2025-03-19 RX ORDER — ALBUTEROL SULFATE 0.83 MG/ML
20 SOLUTION RESPIRATORY (INHALATION) ONCE
Status: DISCONTINUED | OUTPATIENT
Start: 2025-03-19 | End: 2025-03-19

## 2025-03-19 RX ORDER — DEXTROSE MONOHYDRATE 100 MG/ML
50 INJECTION, SOLUTION INTRAVENOUS CONTINUOUS
Status: ACTIVE | OUTPATIENT
Start: 2025-03-19 | End: 2025-03-20

## 2025-03-19 RX ORDER — ONDANSETRON HYDROCHLORIDE 2 MG/ML
4 INJECTION, SOLUTION INTRAVENOUS EVERY 6 HOURS PRN
Status: DISCONTINUED | OUTPATIENT
Start: 2025-03-19 | End: 2025-03-19

## 2025-03-19 RX ORDER — INDOMETHACIN 25 MG/1
CAPSULE ORAL
Status: COMPLETED
Start: 2025-03-19 | End: 2025-03-19

## 2025-03-19 RX ORDER — ONDANSETRON HYDROCHLORIDE 2 MG/ML
INJECTION, SOLUTION INTRAVENOUS
Status: COMPLETED
Start: 2025-03-19 | End: 2025-03-19

## 2025-03-19 RX ORDER — FAMOTIDINE 10 MG/ML
20 INJECTION, SOLUTION INTRAVENOUS ONCE
Status: DISCONTINUED | OUTPATIENT
Start: 2025-03-19 | End: 2025-03-21

## 2025-03-19 RX ORDER — DEXTROSE 50 % IN WATER (D50W) INTRAVENOUS SYRINGE
25 ONCE
Status: COMPLETED | OUTPATIENT
Start: 2025-03-19 | End: 2025-03-19

## 2025-03-19 RX ORDER — ONDANSETRON HYDROCHLORIDE 2 MG/ML
4 INJECTION, SOLUTION INTRAVENOUS ONCE
Status: COMPLETED | OUTPATIENT
Start: 2025-03-19 | End: 2025-03-19

## 2025-03-19 RX ORDER — NIFEDIPINE 60 MG/1
60 TABLET, FILM COATED, EXTENDED RELEASE ORAL 2 TIMES DAILY
Status: DISCONTINUED | OUTPATIENT
Start: 2025-03-19 | End: 2025-03-26 | Stop reason: HOSPADM

## 2025-03-19 RX ORDER — DEXTROSE MONOHYDRATE 100 MG/ML
50 INJECTION, SOLUTION INTRAVENOUS CONTINUOUS
Status: ACTIVE | OUTPATIENT
Start: 2025-03-19 | End: 2025-03-19

## 2025-03-19 RX ORDER — INDOMETHACIN 25 MG/1
50 CAPSULE ORAL ONCE
Status: COMPLETED | OUTPATIENT
Start: 2025-03-19 | End: 2025-03-19

## 2025-03-19 RX ORDER — CALCIUM GLUCONATE 20 MG/ML
INJECTION, SOLUTION INTRAVENOUS
Status: COMPLETED
Start: 2025-03-19 | End: 2025-03-19

## 2025-03-19 RX ORDER — CALCIUM GLUCONATE 20 MG/ML
2 INJECTION, SOLUTION INTRAVENOUS ONCE
Status: COMPLETED | OUTPATIENT
Start: 2025-03-19 | End: 2025-03-19

## 2025-03-19 RX ORDER — ALBUMIN HUMAN 50 G/1000ML
25 SOLUTION INTRAVENOUS
Status: DISCONTINUED | OUTPATIENT
Start: 2025-03-19 | End: 2025-03-19

## 2025-03-19 RX ADMIN — HEPARIN SODIUM 5000 UNITS: 5000 INJECTION, SOLUTION INTRAVENOUS; SUBCUTANEOUS at 13:08

## 2025-03-19 RX ADMIN — DEXTROSE MONOHYDRATE 25 G: 25 INJECTION, SOLUTION INTRAVENOUS at 17:57

## 2025-03-19 RX ADMIN — SODIUM BICARBONATE 50 MEQ: 84 INJECTION, SOLUTION INTRAVENOUS at 09:06

## 2025-03-19 RX ADMIN — CLOTRIMAZOLE: 1 CREAM TOPICAL at 13:43

## 2025-03-19 RX ADMIN — CLOTRIMAZOLE: 1 CREAM TOPICAL at 20:49

## 2025-03-19 RX ADMIN — FLUOCINONIDE: 0.5 OINTMENT TOPICAL at 20:49

## 2025-03-19 RX ADMIN — INDOMETHACIN 50 MEQ: 25 CAPSULE ORAL at 09:06

## 2025-03-19 RX ADMIN — HEPARIN SODIUM 5000 UNITS: 5000 INJECTION, SOLUTION INTRAVENOUS; SUBCUTANEOUS at 20:48

## 2025-03-19 RX ADMIN — FLUOCINONIDE: 0.5 OINTMENT TOPICAL at 13:43

## 2025-03-19 RX ADMIN — HEPARIN SODIUM 5000 UNITS: 5000 INJECTION, SOLUTION INTRAVENOUS; SUBCUTANEOUS at 06:33

## 2025-03-19 RX ADMIN — CALCIUM GLUCONATE 2 G: 20 INJECTION, SOLUTION INTRAVENOUS at 08:38

## 2025-03-19 RX ADMIN — INSULIN LISPRO 2 UNITS: 100 INJECTION, SOLUTION INTRAVENOUS; SUBCUTANEOUS at 15:50

## 2025-03-19 RX ADMIN — CLOTRIMAZOLE: 1 CREAM TOPICAL at 02:17

## 2025-03-19 RX ADMIN — TACROLIMUS 0.5 MG: 0.5 CAPSULE ORAL at 17:45

## 2025-03-19 RX ADMIN — DEXTROSE MONOHYDRATE 25 G: 25 INJECTION, SOLUTION INTRAVENOUS at 08:40

## 2025-03-19 RX ADMIN — SODIUM ZIRCONIUM CYCLOSILICATE 10 G: 10 POWDER, FOR SUSPENSION ORAL at 15:48

## 2025-03-19 RX ADMIN — ONDANSETRON HYDROCHLORIDE 4 MG: 2 INJECTION, SOLUTION INTRAVENOUS at 13:42

## 2025-03-19 RX ADMIN — IMIQUIMOD 1 PACKET: 12.5 CREAM TOPICAL at 13:43

## 2025-03-19 RX ADMIN — TACROLIMUS 0.5 MG: 0.5 CAPSULE ORAL at 06:33

## 2025-03-19 RX ADMIN — DEXTROSE MONOHYDRATE 25 G: 25 INJECTION, SOLUTION INTRAVENOUS at 10:51

## 2025-03-19 RX ADMIN — INSULIN HUMAN 5 UNITS: 100 INJECTION, SOLUTION PARENTERAL at 08:39

## 2025-03-19 RX ADMIN — PRAVASTATIN SODIUM 10 MG: 20 TABLET ORAL at 20:48

## 2025-03-19 RX ADMIN — INSULIN LISPRO 2 UNITS: 100 INJECTION, SOLUTION INTRAVENOUS; SUBCUTANEOUS at 10:52

## 2025-03-19 RX ADMIN — INSULIN HUMAN 5 UNITS: 100 INJECTION, SOLUTION PARENTERAL at 10:50

## 2025-03-19 RX ADMIN — ONDANSETRON 4 MG: 2 INJECTION INTRAMUSCULAR; INTRAVENOUS at 13:42

## 2025-03-19 RX ADMIN — SODIUM CHLORIDE 1000 ML: 9 INJECTION, SOLUTION INTRAVENOUS at 08:48

## 2025-03-19 RX ADMIN — INSULIN GLARGINE 3 UNITS: 100 INJECTION, SOLUTION SUBCUTANEOUS at 18:22

## 2025-03-19 RX ADMIN — PANTOPRAZOLE SODIUM 40 MG: 40 TABLET, DELAYED RELEASE ORAL at 06:33

## 2025-03-19 RX ADMIN — FLUOCINONIDE: 0.5 OINTMENT TOPICAL at 02:17

## 2025-03-19 RX ADMIN — ACETAMINOPHEN 975 MG: 325 TABLET, FILM COATED ORAL at 22:26

## 2025-03-19 RX ADMIN — INSULIN HUMAN 10 UNITS: 100 INJECTION, SOLUTION PARENTERAL at 17:55

## 2025-03-19 SDOH — ECONOMIC STABILITY: HOUSING INSECURITY: AT ANY TIME IN THE PAST 12 MONTHS, WERE YOU HOMELESS OR LIVING IN A SHELTER (INCLUDING NOW)?: NO

## 2025-03-19 SDOH — SOCIAL STABILITY: SOCIAL INSECURITY: DO YOU FEEL UNSAFE GOING BACK TO THE PLACE WHERE YOU ARE LIVING?: NO

## 2025-03-19 SDOH — SOCIAL STABILITY: SOCIAL INSECURITY: HAS ANYONE EVER THREATENED TO HURT YOUR FAMILY OR YOUR PETS?: NO

## 2025-03-19 SDOH — SOCIAL STABILITY: SOCIAL INSECURITY: WERE YOU ABLE TO COMPLETE ALL THE BEHAVIORAL HEALTH SCREENINGS?: YES

## 2025-03-19 SDOH — SOCIAL STABILITY: SOCIAL INSECURITY: DOES ANYONE TRY TO KEEP YOU FROM HAVING/CONTACTING OTHER FRIENDS OR DOING THINGS OUTSIDE YOUR HOME?: NO

## 2025-03-19 SDOH — ECONOMIC STABILITY: FOOD INSECURITY: WITHIN THE PAST 12 MONTHS, YOU WORRIED THAT YOUR FOOD WOULD RUN OUT BEFORE YOU GOT THE MONEY TO BUY MORE.: NEVER TRUE

## 2025-03-19 SDOH — SOCIAL STABILITY: SOCIAL INSECURITY: HAVE YOU HAD ANY THOUGHTS OF HARMING ANYONE ELSE?: NO

## 2025-03-19 SDOH — ECONOMIC STABILITY: HOUSING INSECURITY: IN THE LAST 12 MONTHS, WAS THERE A TIME WHEN YOU WERE NOT ABLE TO PAY THE MORTGAGE OR RENT ON TIME?: NO

## 2025-03-19 SDOH — SOCIAL STABILITY: SOCIAL INSECURITY: WITHIN THE LAST YEAR, HAVE YOU BEEN HUMILIATED OR EMOTIONALLY ABUSED IN OTHER WAYS BY YOUR PARTNER OR EX-PARTNER?: NO

## 2025-03-19 SDOH — SOCIAL STABILITY: SOCIAL INSECURITY: HAVE YOU HAD THOUGHTS OF HARMING ANYONE ELSE?: NO

## 2025-03-19 SDOH — ECONOMIC STABILITY: FOOD INSECURITY: WITHIN THE PAST 12 MONTHS, THE FOOD YOU BOUGHT JUST DIDN'T LAST AND YOU DIDN'T HAVE MONEY TO GET MORE.: NEVER TRUE

## 2025-03-19 SDOH — SOCIAL STABILITY: SOCIAL INSECURITY: WITHIN THE LAST YEAR, HAVE YOU BEEN AFRAID OF YOUR PARTNER OR EX-PARTNER?: NO

## 2025-03-19 SDOH — ECONOMIC STABILITY: HOUSING INSECURITY: IN THE PAST 12 MONTHS, HOW MANY TIMES HAVE YOU MOVED WHERE YOU WERE LIVING?: 0

## 2025-03-19 SDOH — SOCIAL STABILITY: SOCIAL INSECURITY: DO YOU FEEL ANYONE HAS EXPLOITED OR TAKEN ADVANTAGE OF YOU FINANCIALLY OR OF YOUR PERSONAL PROPERTY?: NO

## 2025-03-19 SDOH — SOCIAL STABILITY: SOCIAL INSECURITY: ARE YOU OR HAVE YOU BEEN THREATENED OR ABUSED PHYSICALLY, EMOTIONALLY, OR SEXUALLY BY ANYONE?: NO

## 2025-03-19 SDOH — SOCIAL STABILITY: SOCIAL INSECURITY: ARE THERE ANY APPARENT SIGNS OF INJURIES/BEHAVIORS THAT COULD BE RELATED TO ABUSE/NEGLECT?: NO

## 2025-03-19 SDOH — ECONOMIC STABILITY: INCOME INSECURITY: IN THE PAST 12 MONTHS HAS THE ELECTRIC, GAS, OIL, OR WATER COMPANY THREATENED TO SHUT OFF SERVICES IN YOUR HOME?: NO

## 2025-03-19 SDOH — ECONOMIC STABILITY: FOOD INSECURITY: HOW HARD IS IT FOR YOU TO PAY FOR THE VERY BASICS LIKE FOOD, HOUSING, MEDICAL CARE, AND HEATING?: NOT HARD AT ALL

## 2025-03-19 SDOH — ECONOMIC STABILITY: TRANSPORTATION INSECURITY: IN THE PAST 12 MONTHS, HAS LACK OF TRANSPORTATION KEPT YOU FROM MEDICAL APPOINTMENTS OR FROM GETTING MEDICATIONS?: NO

## 2025-03-19 SDOH — SOCIAL STABILITY: SOCIAL INSECURITY: ABUSE: ADULT

## 2025-03-19 ASSESSMENT — COGNITIVE AND FUNCTIONAL STATUS - GENERAL
PATIENT BASELINE BEDBOUND: NO
MOBILITY SCORE: 24
DAILY ACTIVITIY SCORE: 24

## 2025-03-19 ASSESSMENT — ACTIVITIES OF DAILY LIVING (ADL)
JUDGMENT_ADEQUATE_SAFELY_COMPLETE_DAILY_ACTIVITIES: YES
LACK_OF_TRANSPORTATION: NO
HEARING - RIGHT EAR: FUNCTIONAL
DRESSING YOURSELF: INDEPENDENT
ADEQUATE_TO_COMPLETE_ADL: YES
BATHING: INDEPENDENT
GROOMING: INDEPENDENT
TOILETING: INDEPENDENT
LACK_OF_TRANSPORTATION: NO
WALKS IN HOME: INDEPENDENT
HEARING - LEFT EAR: FUNCTIONAL
FEEDING YOURSELF: INDEPENDENT
PATIENT'S MEMORY ADEQUATE TO SAFELY COMPLETE DAILY ACTIVITIES?: YES

## 2025-03-19 ASSESSMENT — PAIN SCALES - GENERAL
PAINLEVEL_OUTOF10: 0 - NO PAIN
PAINLEVEL_OUTOF10: 0 - NO PAIN
PAINLEVEL_OUTOF10: 2

## 2025-03-19 ASSESSMENT — LIFESTYLE VARIABLES
AUDIT-C TOTAL SCORE: 0
AUDIT-C TOTAL SCORE: 0
HOW OFTEN DO YOU HAVE 6 OR MORE DRINKS ON ONE OCCASION: NEVER
SKIP TO QUESTIONS 9-10: 1
HOW OFTEN DO YOU HAVE A DRINK CONTAINING ALCOHOL: NEVER
HOW MANY STANDARD DRINKS CONTAINING ALCOHOL DO YOU HAVE ON A TYPICAL DAY: PATIENT DOES NOT DRINK

## 2025-03-19 ASSESSMENT — PAIN - FUNCTIONAL ASSESSMENT
PAIN_FUNCTIONAL_ASSESSMENT: 0-10
PAIN_FUNCTIONAL_ASSESSMENT: 0-10

## 2025-03-19 ASSESSMENT — PAIN DESCRIPTION - DESCRIPTORS: DESCRIPTORS: TIGHTNESS

## 2025-03-19 NOTE — PROGRESS NOTES
Manolo Bashir is a 69 y.o. male on day 0 of admission presenting with Hyperkalemia.      Subjective   Mr. Bashir was lethargic, but following instruction. He was very hypotensive and BMP showed K pf 7.     Objective     Last Recorded Vitals  /62 (BP Location: Left arm, Patient Position: Lying)   Pulse 83   Temp 36.8 °C (98.2 °F) (Temporal)   Resp 17   Wt 65.8 kg (145 lb)   SpO2 99%   Intake/Output last 3 Shifts:    Intake/Output Summary (Last 24 hours) at 3/19/2025 0912  Last data filed at 3/19/2025 0905  Gross per 24 hour   Intake 1200 ml   Output 400 ml   Net 800 ml       Admission Weight  Weight: 65.8 kg (145 lb) (03/19/25 0800)    Daily Weight  03/19/25 : 65.8 kg (145 lb)    Image Results  ECG 12 lead  Normal sinus rhythm  Left ventricular hypertrophy with QRS widening ( Sokolow-Haney , [place] product )  Cannot rule out Septal infarct , age undetermined  Abnormal ECG  No previous ECGs available    See ED provider note for full interpretation and clinical correlation  Confirmed by Faustina Workman ([id number]) on 3/18/2025 10:42:23 PM  CT abdomen pelvis w IV contrast  Narrative: STUDY:  CT Abdomen and Pelvis with IV Contrast; 03/18/2025 at 3:14 PM  INDICATION:  Air-fluid levels on KUB; evaluate for obstruction vs. megacolon.  COMPARISON:  XR KUB of same date, 03/18/25.  ACCESSION NUMBER(S):  [id number]  ORDERING CLINICIAN:  GRZEGORZ GIANG  TECHNIQUE:  CT of the abdomen and pelvis was performed.  Contiguous axial images  were obtained at 3 mm slice thickness through the abdomen and pelvis.   Coronal and sagittal reconstructions at 3 mm slice thickness were  performed.  Omnipaque-350 75 mL was administered intravenously.    FINDINGS:  LOWER CHEST:  The heart size is enlarged with left ventricular enlargement.  No  pericardial effusion.  Lung bases are clear.  Small pleural effusion  dependently in the right hemithorax.     ABDOMEN:     LIVER:  The liver is enlarged measuring 17.8 cm in craniocaudad diameter.    Smooth surface contour.  Normal attenuation.     BILE DUCTS:  No intrahepatic or extrahepatic biliary ductal dilatation.     GALLBLADDER:  The gallbladder is unremarkable.  STOMACH:  No abnormalities identified.     PANCREAS:  No masses or ductal dilatation.     SPLEEN:  The spleen is enlarged measuring 13.4 cm in craniocaudad diameter.   The spleen enhances diffusely without mass lesion.     ADRENAL GLANDS:  No thickening or nodules.     KIDNEYS AND URETERS:  The native kidneys are atrophic in size.  A transplant kidney in the  left lower quadrants no cysts normal cortical medullary enhancement.   There is no evidence for hydronephrosis or calculi.     PELVIS:     BLADDER:  No abnormalities identified.     REPRODUCTIVE ORGANS:  No abnormalities identified.     BOWEL:  No abnormalities identified.     VESSELS:  No abnormalities identified.  Abdominal aorta is normal in caliber.      PERITONEUM/RETROPERITONEUM/LYMPH NODES:  No free fluid.  No pneumoperitoneum.  No lymphadenopathy.     ABDOMINAL WALL:  No abnormalities identified.  SOFT TISSUES:   No abnormalities identified.     BONES:  No acute fracture or aggressive osseous lesion.  Impression: 1.  No evidence for bowel obstruction or abnormal colonic dilatation.  2.  Hepatosplenomegaly.  3.  Small right pleural effusion.  4.  Cardiomegaly with left ventricular enlargement.  5.  Bilateral atrophic native kidneys with a renal transplant at the  left lower quadrant.  Signed by Emmett Martell MD  XR abdomen 1 view  Narrative: STUDY:  Abdomen Radiographs;  03/18/2025 2:03 PM  INDICATION:  Diarrhea.  COMPARISON:  09/10/2024, 08/24/2024 CT Abdomen and Pelvis.  ACCESSION NUMBER(S):  [id number]  ORDERING CLINICIAN:  TECHNIQUE:  One view of the abdomen.  FINDINGS:    Bowel gas pattern is normal without obstruction or ileus.  Mild to  moderate gas and stool.  There are no convincing calculi or abnormal  calcifications.  No focal osseous abnormalities.    Impression:  Nonobstructive bowel gas pattern.  Signed by Gallo Butt MD  XR chest 1 view  Narrative: STUDY:  Chest Radiograph;  3/18/2025 11:57AM  INDICATION:  Evaluate for infection.  COMPARISON:  XR Chest: 2/27/2025, 8/27/2024 CT Chest: 4/3/2024  ACCESSION NUMBER(S):  [id number]  ORDERING CLINICIAN:  YEE WALTER  TECHNIQUE:  Frontal chest was obtained at 11:57 hours.  FINDINGS:  CARDIOMEDIASTINAL SILHOUETTE:  Heart is mildly enlarged with slight increased pulmonary vascularity      LUNGS:  Lungs are clear.stable opacity in the high right peritracheal region.     ABDOMEN:  No remarkable upper abdominal findings.     BONES:  No acute osseous changes.  Impression: No acute process.  Signed by Gallo Butt MD      Physical Exam  Constitutional:       Appearance: Normal appearance.   HENT:      Head: Normocephalic and atraumatic.      Nose: Nose normal.      Mouth/Throat:      Mouth: Mucous membranes are dry.   Eyes:      Extraocular Movements: Extraocular movements intact.      Conjunctiva/sclera: Conjunctivae normal.   Cardiovascular:      Rate and Rhythm: Normal rate and regular rhythm.   Pulmonary:      Effort: Pulmonary effort is normal.      Breath sounds: Normal breath sounds.   Abdominal:      General: Bowel sounds are normal.      Palpations: Abdomen is soft.   Musculoskeletal:      Cervical back: Normal range of motion and neck supple.   Skin:     General: Skin is warm.   Neurological:      Mental Status: He is alert and oriented to person, place, and time.   Psychiatric:         Mood and Affect: Mood normal.         Behavior: Behavior normal.         Relevant Results               Assessment/Plan                  Assessment & Plan  Hyperkalemia  Despite dialysis yesterday, K was elevated this morning. He was treated with K protocol, Calcium, lokelma and insulin. ICU consulted and accepted patient.    Hypovolemic shock:  this is most likely due to GI loss. IV fluid helped improved patient's BP. Cautious with  Iv fluid since patient is on HD.     Hypotension due to Hypovolemic shock:  IV fluid support. Albumin given. Transfer to ICU to help with BP management.     ESRD on HD hx of renal transplant and immunosuppression:  appreciate Nephro recommendation. On tacrolimus. Med level has been ordered. Check to see if this is contributing to patient's hyperkalemia and diarrhea.    Hx of Hypertension:  meds held since patient is hypotensive    Acute Diarrheal Illness:  noted prior hx of C. Diff infection. C.diff ordered. On isolation. Continue to monitor.                   Jaquan Zhao MD

## 2025-03-19 NOTE — NURSING NOTE
Report to Receiving RN:    Report To: AYO Shea  Time Report Called: 2055  Hand-Off Communication: POST REPORT; pt stable, completed and tolerated HD tx, pt hypertensive throughout tx but asymptomatic, post vitals: bp 168/103, pulse 110, pt removed no fluid per Dr. orders,   Complications During Treatment: No  Ultrafiltration Treatment: No  Medications Administered During Dialysis: No  Blood Products Administered During Dialysis: No  Labs Sent During Dialysis: No  Heparin Drip Rate Changes: No  Dialysis Catheter Dressing: n/a  Last Dressing Change: n/a AVF      Last Updated: 8:42 PM by DEIRDRE NICOLE

## 2025-03-19 NOTE — H&P
Medical Intensive Care - History and Physical   Subjective    Manolo Bashir is a 69 y.o. year old male patient admitted on 3/18/2025 with following ICU needs:Urgent Dialysis for Hyperkalemia     HPI:   69 year old male with a past medical history of ESRD s/p renal transplants x2, non-functioning: initially in 1992 c/b allograft failure 2006, 2nd transplant 2013, c/b impaired allograft function, currently on iHD since May 2024(TTS); on tacrolimus and prednisone 5mg, MGUS, DMII, hypertension, prostate cancer s/p radical prostatectomy, HCV (treated w/ Harvoni, HCV PCR negative 2019), recent history of C. diff (treated w/ vancomycin PO 10 day course EOT 2/6/25), as well as development of graft intolerance over past 2 months admitted to ICU for hypotension and urgent dialysis for hyperkalemia.      Patient initially presented to the ED for persistent diarrhea that prevented him from attending dialysis for 2-3 weeks. Labs on admission were notable for elevated potassium at 5.5, BUN of 39, and creatinine of 12.31, tacro levels were <2.0. Stool pathogen and C.Diff were negative but patient tested positive for rotavirus. Patient had rapid response called this AM for hypotension with multiple readings of 80's/50 and potassium of . He was given calcium gluconate, 2L of LR  and  hyper K cocktail. An EKG was obtained at that time as patient endorsed CP which showed ST elevations in V2/V3 Patient was admitted to the MICU shortly after for urgent dialysis and hypotension.  Patient reports Chest pain, Nausea, Vomiting, abdominal pain and pruritus.     Review of Systems:  Review of Systems   As noted in HPI       Meds    Home medications:  Current Outpatient Medications   Medication Instructions    acetaminophen (Tylenol) 325 mg tablet 3 tablets, oral, Every 6 hours PRN    amoxicillin-pot clavulanate (Augmentin) 500-125 mg tablet 1 tablet, oral, Every 24 hours scheduled, Take at night, after dialysis.    benzonatate (TESSALON) 100  mg, oral, 3 times daily PRN, Do not crush or chew.    carvedilol (COREG) 37.5 mg, oral, 2 times daily, Hold for systolic BP <140 and HR <60. PATIENT NEEDS TO FOLLOW UP WITH CARDIOLOGY    cinacalcet (SENSIPAR) 30 mg, oral, Daily    clotrimazole (Lotrimin) 1 % cream Topical, 2 times daily    Cresemba 372 mg, oral, Daily, Take 2 capsules 3/6 at 10pm and 2 capsules 3/7 6am, then starting 3/8 take 2 capsules once a day    Easy Touch Alcohol Prep Pads pads, medicated     epoetin aida 10,000 unit/mL injection Inject 1 mL (10,000 Units) under the skin every 7 days. Do not start before April 17, 2024.    fluocinonide (Lidex) 0.05 % ointment Apply to affected areas twice daily when active as needed. Use less than 14 days per month.    Gvoke HypoPen 1-Pack 1 mg, intramuscular, Every 15 min PRN    imiquimod (Aldara) 5 % cream 1 packet, Topical, 3 times weekly    insulin lispro (HUMALOG) 0-5 Units, subcutaneous, 3 times daily before meals, Take as directed per insulin instructions.    isosorbide mononitrate ER (IMDUR) 60 mg, oral, Daily    ketorolac (Acular) 0.5 % ophthalmic solution Instill 1 drop into left eye three times a day FOR USE AFTER CATARACT SURGERY    lancets (Unilet Super Thin Lancets) 30 gauge misc 3 times daily    lancing device misc use as directed    Lantus Solostar U-100 Insulin 6 Units, subcutaneous, Every morning, Inject 6 units daily as directed, if glucose is above 150 increase by 2 units to a total of 8 units    metoclopramide (REGLAN) 5 mg, oral, 3 times daily before meals    neomycin-polymyxin-dexAMETHasone (Maxitrol) 3.5mg/mL-10,000 unit/mL-0.1 % ophthalmic suspension 2 drops, Left Eye, Every morning    NIFEdipine ER (ADALAT CC) 90 mg, oral, 2 times daily, Do not crush, chew, or split.    pantoprazole (ProtoNix) 40 mg EC tablet Take 1 tablet (40 mg) by mouth once daily in the morning. Take before meals. Do not crush, chew, or split. Do not start before January 22, 2024.    pen needle, diabetic (TechLITE  "Pen Needle) 32 gauge x 5/32\" needle Use 4 a day as directed    pravastatin (PRAVACHOL) 10 mg, oral, Nightly    predniSONE (DELTASONE) 5 mg, oral, Daily    sevelamer carbonate (RENVELA) 800 mg, oral, 3 times daily before meals, Swallow tablet whole; do not crush, break, or chew.    sodium chloride (Ocean) 0.65 % nasal spray 1 spray, Each Nostril, 4 times daily PRN    syringe with needle 3 mL 25 x 5/8\" syringe Use to inject epogen.    tacrolimus (PROGRAF) 0.5 mg, oral, 2 times daily    tacrolimus (Protopic) 0.1 % ointment Topical, 2 times daily    Unilet Super Thin Lancets 30 gauge misc 1 each, miscellaneous, 3 times daily    vitamin B complex-vitamin C-folic acid (Nephrocaps) 1 mg capsule 1 capsule, oral, Daily        Inpatient medications:  Scheduled medications  [Held by provider] carvedilol, 37.5 mg, oral, BID  [Held by provider] cinacalcet, 30 mg, oral, Daily  clotrimazole, , Topical, BID  dextrose, 25 g, intravenous, Once  famotidine, 20 mg, intravenous, Once  fluocinonide, , Topical, BID  heparin (porcine), 5,000 Units, subcutaneous, q8h ZOË  imiquimod, 1 packet, Topical, Once per day on Monday Wednesday Friday  insulin glargine, 3 Units, subcutaneous, q24h  insulin lispro, 0-5 Units, subcutaneous, TID AC  insulin regular, 5 Units, intravenous, Once  isosorbide mononitrate ER, 60 mg, oral, Daily  [Held by provider] NIFEdipine ER, 60 mg, oral, BID  pantoprazole, 40 mg, oral, Daily before breakfast  pravastatin, 10 mg, oral, Nightly  predniSONE, 5 mg, oral, Daily  [Held by provider] sevelamer carbonate, 800 mg, oral, TID AC  sodium chloride, 1,000 mL, intravenous, Once  sodium chloride, 500 mL, intravenous, Once  sodium zirconium cyclosilicate, 10 g, oral, q8h  tacrolimus, 0.5 mg, oral, BID  vitamin B complex-vitamin C-folic acid, 1 capsule, oral, Daily      Continuous medications  dextrose 10 % in water (D10W), 50 mL/hr      PRN medications  PRN medications: acetaminophen, dextrose, dextrose, glucagon, glucagon " "    Objective    Blood pressure 113/62, pulse 79, temperature 36.8 °C (98.2 °F), temperature source Temporal, resp. rate 25, height 1.803 m (5' 11\"), weight 65.8 kg (145 lb), SpO2 97%.     Physical Exam   General: Ill appearing, somnolent. Appears stated age. In no acute distress   Cardiovascular: RRR, no r/m/g  Pulmonary: Lungs clear to auscultation bilaterally. No wheezes/ rhonchi/ rales   GI: +BS, soft, tender to palpation in LLQ, non-distended  : No suprapubic/ flank pain  Extremities: No LE edema   Skin: warm and dry. No rashes or lesions. Pruritus on exam   Neurologic: Alert to self, location and situation. Moves all extremities.    Intake/Output Summary (Last 24 hours) at 3/19/2025 1535  Last data filed at 3/19/2025 1027  Gross per 24 hour   Intake 2200 ml   Output 400 ml   Net 1800 ml     Labs:   Results from last 72 hours   Lab Units 03/19/25  1318 03/19/25  0846 03/19/25  0717   SODIUM mmol/L 137 136 133*   POTASSIUM mmol/L 5.7* 6.8* 7.1*   CHLORIDE mmol/L 102 101 99   CO2 mmol/L 23 21 19*   BUN mg/dL 35* 37* 36*   CREATININE mg/dL 11.33* 11.89* 11.83*   GLUCOSE mg/dL 198* 183* 170*   CALCIUM mg/dL 9.7 9.5 9.9   ANION GAP mmol/L 18 21* 22*   EGFR mL/min/1.73m*2 4* 4* 4*   PHOSPHORUS mg/dL 3.9 4.1 3.8      Results from last 72 hours   Lab Units 03/19/25  0717 03/18/25  1138   WBC AUTO x10*3/uL 4.9 3.9*   HEMOGLOBIN g/dL 12.6* 12.5*   HEMATOCRIT % 36.4* 36.7*   PLATELETS AUTO x10*3/uL 107* 103*   NEUTROS PCT AUTO % 67.4 70.4   LYMPHS PCT AUTO % 15.5 14.0   MONOS PCT AUTO % 13.8 11.2   EOS PCT AUTO % 2.1 3.6        Micro/ID:     Lab Results   Component Value Date    URINECULTURE CANCELED 07/19/2023    BLOODCULT No growth at 4 days -  FINAL REPORT 02/27/2025    BLOODCULT No growth at 4 days -  FINAL REPORT 02/27/2025       Summary of Key Imaging Results  US of LUE completed 3/19: Critical Result: Stenosis at the arterial anastomosis.     Assessment and Plan         Assessment: 69 year old male with a past " medical history of ESRD s/p renal transplants x2, non-functioning: initially in 1992 c/b allograft failure 2006, 2nd transplant 2013, c/b impaired allograft function, currently on iHD since May 2024(TTS); on tacrolimus and prednisone 5mg, MGUS, DMII, hypertension, prostate cancer s/p radical prostatectomy, HCV (treated w/ Harvoni, HCV PCR negative 2019), recent history of C. diff (treated w/ vancomycin PO 10 day course EOT 2/6/25),  admitted to the MICU on 3/18 for hypotension and urgent need for dialysis. Patient currently awaiting dialysis and s/p 2 Hyper K cocktail.    Mechanical Ventilation: none  Sedation/Analgesia:  none  Restraints: no    Plan:  NEUROLOGY/PSYCH:       LLQ pain              CARDIOVASCULAR:  #Troponemia   :: Patient with EKG concerning for ST elevation in V2/V3 repeat showed improvement.   :: Troponin increasing 130 139-->171--> 224  - Will continue to trend troponin     #HTN   Hold home Coreg 37.5 mg BID given hypotension   Hold Nifedipine 90,   Hold Imdur 60 mg   -         PULMONARY:  #Small right Pleural effusion   :: CT abdomen pelvis completed 3/18/25:  Small right pleural effusion.   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   :: Potassium 7.1--> 6.8--> 5.7 s/p 2 hyper K cocktails   ::  - Dialysis scheduled for 16:00   - LUE ultrasound to assess for AVF stenosis or clot   - daily AM tacro levels     - c/w Pred and tacro for now  - Tx Nephrology will follow for possible nephrotomy       GASTROENTEROLOGY:  #Nausea/Vomiting   :: EKG    #Gastroparesis   - hold home metoclopramide 5 mg TID for now, follow up ECG for QTc      ENDOCRINOLOGY:  #TIIDM   :: A21C 9.1 (12/2024)  :: Home reg- Lantus 6 units qam, SSI      Plan:  - on CLD   - Lantus 3 units every day, SSI   - POC Glucose AC at bedtime   - titrate to -180     HEMATOLOGY:  SHYAM     MUSCULOSKELETAL/ SKIN:  #Hypopigmentation on anterior shins bilaterally     INFECTIOUS DISEASE:  #Hx of C dif   #C/f oppurtunistic respiratory infection   :: Cdif PCR positive, C dif EIA negative, stool pathogen panel negative, stool O/P negative (01/2025)  :: s/p 10 days PO Vanc (01-02/2025)  :: Cdif PCR negative in the ED  :: CT A/P without evidence of acute colonic pathology   :: Histoplasma antigen negative, BD-glucan negative, aspergillus negative, blastomyces abx negative, T spot negative (02-03/2025)  :: C. Diff is negative,   :: + For Rotavirus     ICU Check List     FEN  Fluids: PRN for euvolemia; caution ESRD   Electrolytes: PRN K>4, Mg>2 P>3   Nutrition: NPO as patient with N/V   Prophylaxis:  DVT ppx: Sub q heparin   GI ppx: Protonix 40   Bowel care: Not indicated as patient has diarrhea  Hardware:  Access: 2 Right sided PIV's and LUE fistula     Social:  Code: Full Code    HPOA: Purnima Rodasken   [phone]      Negin Roach MD   03/19/25 at 3:35 PM  PGY-1 Internal Medicine Resident      Disclaimer: Documentation completed with the information available at the time of input. The times in the chart may not be reflective of actual patient care times, interventions, or procedures. Documentation occurs after the physical care of the patient.

## 2025-03-19 NOTE — CONSULTS
Transplant Nephrology progress note     Date of admission: 3/18/2025     Manolo Bashir is a 69 y.o.  with PMH   Past Medical History:   Diagnosis Date    Anemia     Arthritis     Cataract     Chronic kidney disease, stage 3 unspecified (Multi) 09/26/2018    Stage 3 chronic kidney disease    CKD (chronic kidney disease)     stage V    Cough 02/12/2024    COVID-19 06/18/2020    COVID-19 virus infection    Diabetes (Multi)     ESRD (end stage renal disease) (Multi)     Focal and segmental proliferative glomerulonephritis 12/23/2023    HTN (hypertension)     Hyperlipidemia     Other long term (current) drug therapy 07/20/2021    High risk medication use    Personal history of other diseases of the circulatory system     Personal history of cardiac murmur    Personal history of other infectious and parasitic diseases 08/17/2015    History of hepatitis    Polyp, colonic 08/17/2023    Primary osteoarthritis of both ankles 08/17/2023    Prostate cancer (Multi)     Tubular adenoma of colon 08/17/2023    Unspecified kidney failure 08/17/2016    Renal failure        SUBJECTIVE:  Manolo Bashir is a 69 y.o. male presenting with LLQ allograft tenderness and gross hematuria.  He has ESKD attributed to hypertension since 1998 s/p kidney transplant #1 3/26/1992 that failed 11/13/2006), s/p kidney transplant #2 7/13/2013 which failed in May 2024 following which he has been on HD (TTS, [facility] via LUE AVG) . He also has known MGUS with IgG and IgA lambda (bone marrow bx 10/2023 with no plasma cell dyscrasia), DM2, HTN, prostate cancer s/p radical prostatectomy, baseline urinary incontinence, HCV s/p treatment (recent HCV PCR negative 7/2024).        Pt presented with missed dialysis due to diarrhea which he thinks is from recent initiation of abx. Pt had K 5.5. Had 2 hour HD session via AVF. Repeat BMP showed K 6.2. Given hyperK cocktail. Admitted to MICU for hyperkalemia.   Gets Hd at [facility], nephrologist Dr. Bridges. KEVIN  "69kg.   While in the ED he was initially hypertensive, he received his Coreg and nifedipine but then this morning pt developed hypotension, got 1L NS IVF. BP stable at this time.       PROBLEM LIST:  Assessment & Plan  Hyperkalemia           ALLERGIES:  No Known Allergies         CURRENT MEDICATIONS:  Scheduled medications  [Held by provider] carvedilol, 37.5 mg, oral, BID  [Held by provider] cinacalcet, 30 mg, oral, Daily  clotrimazole, , Topical, BID  dextrose, 25 g, intravenous, Once  famotidine, 20 mg, intravenous, Once  fluocinonide, , Topical, BID  heparin (porcine), 5,000 Units, subcutaneous, q8h ZOË  imiquimod, 1 packet, Topical, Once per day on Monday Wednesday Friday  insulin glargine, 3 Units, subcutaneous, q24h  insulin lispro, 0-5 Units, subcutaneous, TID AC  insulin regular, 5 Units, intravenous, Once  isosorbide mononitrate ER, 60 mg, oral, Daily  [Held by provider] NIFEdipine ER, 60 mg, oral, BID  pantoprazole, 40 mg, oral, Daily before breakfast  pravastatin, 10 mg, oral, Nightly  predniSONE, 5 mg, oral, Daily  [Held by provider] sevelamer carbonate, 800 mg, oral, TID AC  sodium chloride, 1,000 mL, intravenous, Once  sodium chloride, 500 mL, intravenous, Once  sodium zirconium cyclosilicate, 10 g, oral, q8h  tacrolimus, 0.5 mg, oral, BID  vitamin B complex-vitamin C-folic acid, 1 capsule, oral, Daily      Continuous medications  dextrose 10 % in water (D10W), 50 mL/hr      PRN medications  PRN medications: acetaminophen, dextrose, dextrose, glucagon, glucagon       OBJECTIVE:    VITALS: Visit Vitals  /69   Pulse 81   Temp 36.8 °C (98.2 °F) (Temporal)   Resp 13   Ht 1.803 m (5' 11\")   Wt 65.8 kg (145 lb)   SpO2 99%   BMI 20.22 kg/m²   Smoking Status Never   BSA 1.82 m²        General: No distress   CVS: S1 S2 no murmurs  RESP:  Lungs clear to auscultation   ABDO: Soft, non-tender   Neuro: A + O x 3  Skin: No rash   Extremities: No edema  Access: LUE AVF       LABS:  Results from last 72 hours "   Lab Units 03/19/25  0846 03/19/25  0717   WBC AUTO x10*3/uL  --  4.9   HEMOGLOBIN g/dL  --  12.6*   MCV fL  --  83   PLATELETS AUTO x10*3/uL  --  107*   BUN mg/dL 37* 36*   CREATININE mg/dL 11.89* 11.83*   CALCIUM mg/dL 9.5 9.9   TACROLIMUS ng/mL  --  2.9            Intake/Output Summary (Last 24 hours) at 3/19/2025 1323  Last data filed at 3/19/2025 1027  Gross per 24 hour   Intake 2200 ml   Output 400 ml   Net 1800 ml          ASSESSMENT AND PLAN:  Manolo Bashir is a 69 y.o. male presenting with LLQ allograft tenderness and gross hematuria.  He has ESKD attributed to hypertension since 1998 s/p kidney transplant #1 3/26/1992 that failed 11/13/2006), s/p kidney transplant #2 7/13/2013 which failed in May 2024 following which he has been on HD (TTS, [facility] via LUE AVG) . He also has known MGUS with IgG and IgA lambda (bone marrow bx 10/2023 with no plasma cell dyscrasia), DM2, HTN, prostate cancer s/p radical prostatectomy, baseline urinary incontinence, HCV s/p treatment (recent HCV PCR negative 7/2024).        Pt presented with missed dialysis due to diarrhea which he thinks is from recent initiation of abx. Pt had K 5.5. Had 2 hour HD session via AVF. Repeat BMP showed K 6.2. Given hyperK cocktail. Admitted to MICU for hyperkalemia.   Gets Hd at [facility], nephrologist Dr. Bridges. DW 69kg.   While in the ED he was initially hypertensive, he received his Coreg and nifedipine but then this morning pt developed hypotension, got 1L NS IVF. BP stable at this time.     #ESRD TTS   #Hyperkalemia   -HD at [facility], nephrologist Dr. Bridges, DW 69kg    #Diarrhea  -CMV, C diff negative  -Rotavirus positive      # Immunosuppression-Tac 0.5mg BID, Pred 5mg daily  #Graft intolerance/chronic rejection - transplant nephrectomy outpatient planned    #Hypertension -> Hypotension - holding Coreg 25 mg BID, Nifedipine 90 mg BID   #CKD-MBD - Calcitriol 0.5 mg/day, Sensipar 30 mg/day    Recs:  -IHD today, 2K, no UF given  hypotension and diarrhea  -Unclear why patient was hyperkalemic after dialysis, will obtain pre/post BUN to assess for recirculation from AVF. US of AVF ordered  -strict I/O, obtain RFP a few hours after dialysis today  -will follow        Hans Macias DO   Nephrology fellow PGY 5   Available via Epic Chat   Transplant pager #[phone]

## 2025-03-19 NOTE — SIGNIFICANT EVENT
.Rapid Response Nurse Note: Rapid Response    Pager time: [phone]  Arrival time: 820  Event end time: 938  Location: ED [facility] POD room 20  [] Triage by phone or secure messaging    Rapid response initiated by:  [] Rapid response RN [] Family [] Nursing Supervisor [] Physician   [] RADAR auto page [] Sepsis auto-page [x] RN [] RT   [] NP/PA [] Other:     Primary reason for call:   [] BAT [] New CPAP/BiPAP [] Bleeding [x] Change in mental status   [] Chest pain [] Code blue [] FiO2 >/= 50% [] HR </= 40 bpm   [] HR >/= 130 bpm [] Hyperglycemia [] Hypoglycemia [] RADAR    [] RR </= 8 bpm [] RR >/= 30 bpm [x] SBP </= 90 mmHg [] SpO2 < 90%   [] Seizure [] Sepsis [] Shortness of breath  [x] Staff concern: see comments     Initial VS and/or RADAR VS: T N/A °C; HR 78; RR 25; BP 77/49; SPO2 95%.    Provider Notification: Provider Notification: Attending physician Dr. Zhao; DACR Dr. Wilhelm; MICU Fellow via phone    Interventions:  [] None [x] ABG/VBG [x] Assist w/ICU transfer [] BAT paged    [] Bag mask [] Blood [] Cardioversion [] Code Blue   [] Code blue for intubation [] Code status changed [] Chest x-ray [x] EKG   [x] IV fluid/bolus [] KUB x-ray [x] Labs/cultures [x] Medication   [] Nebulizer treatment [] NIPPV (CPAP/BiPAP) [] Oxygen [] Oral airway   [x] Peripheral IV [] Palliative care consult [] CT/MRI [] Sepsis protocol    [] Suctioned [] Other:     Outcome:  [] Coded and  [] Code blue for intubation [] Coded and transferred to ICU []  on division   [] Remained on division (no change) [] Remained on division + additional monitoring [] Remained in ED [] Transferred to ED   [x] Transferred to ICU [] Transferred to inpatient status [] Transferred for interventions (procedure) [] Transferred to ICU stepdown    [] Transferred to surgery [] Transferred to telemetry [] Sepsis protocol [] STEMI protocol   [] Stroke protocol [x] Bedside nurse instructed to page rapid response for any concerns or acute change in  condition/VS     Additional Comments: Upon arrival patient found lying in bed, lethargic and hypotensive. Per ED RN the patient has been hypotensive this morning, has had multiple episodes of loose stool throughout the night/early morning and is now lethargic. Morning RFP resulting in K 7.1.    The patient is oriented x 4. POCT .  Attending physician Dr. Zhao to bedside- 1 liter IV fluid bolus initiated.   Additional IV access placed- labs obtained.  Hyperkalemia cocktail ordered- administered by ED RN.  Patient condition discussed with MICU Fellow Maryan- patient accepted to MICU. Bed assignment and Charge RN notified.  Second liter of IV fluid initiated per DACR Dr. Palm.   Just prior to transporting to MICU, patient began c/o midsternal chest pain accompanied with belching- 12 lead ECG obtained, underlying left bundle noted by DACR.  Patient safely transported to MICU bed 22. All patient belongings in possession of accompanying spouse.

## 2025-03-19 NOTE — ASSESSMENT & PLAN NOTE
Despite dialysis yesterday, K was elevated this morning. He was treated with K protocol, Calcium, lokelma and insulin. ICU consulted and accepted patient.    Hypovolemic shock:  this is most likely due to GI loss. IV fluid helped improved patient's BP. Cautious with Iv fluid since patient is on HD.     Hypotension due to Hypovolemic shock:  IV fluid support. Albumin given. Transfer to ICU to help with BP management.     ESRD on HD hx of renal transplant and immunosuppression:  appreciate Nephro recommendation. On tacrolimus. Med level has been ordered. Check to see if this is contributing to patient's hyperkalemia and diarrhea.    Hx of Hypertension:  meds held since patient is hypotensive    Acute Diarrheal Illness:  noted prior hx of C. Diff infection. C.diff ordered. On isolation. Continue to monitor.

## 2025-03-19 NOTE — PROGRESS NOTES
Physical Therapy                 Therapy Communication Note    Patient Name: Manolo Bashir  MRN: [id number]  Department: [facility]  Room: [place] [id number]  Today's Date: 3/19/2025     Discipline: Physical Therapy    PT Missed Visit: Yes     Missed Visit Reason: Missed Visit Reason: Patient placed on medical hold (Per chart review, pt's potassium is 7.1. Consulted with RN who states she is code fidencio pt.)    Missed Time: Attempt    Comment: 8:54 AM      Jordi Matthews, PT, DPT

## 2025-03-19 NOTE — NURSING NOTE
Report from Sending RN:    Report From: AYO Nieto  Recent Surgery of Procedure: No  Baseline Level of Consciousness (LOC): A/O X 4  Oxygen Use: No  Type: N/A  Diabetic: Yes, last BS 88  Last BP Med Given Day of Dialysis: none  Last Pain Med Given: yes, see EMAR  Lab Tests to be Obtained with Dialysis: No  Blood Transfusion to be Given During Dialysis: No  Available IV Access: Yes  Medications to be Administered During Dialysis: No  Continuous IV Infusion Running: No  Restraints on Currently or in the Last 24 Hours: No  Hand-Off Communication: full code, no isolation, ED admit for diarrhea, travel by cart  Dialysis Catheter Dressing: N/A graft  Last Dressing Change: N/A

## 2025-03-19 NOTE — CARE PLAN
The patient's goals for the shift include      The clinical goals for the shift include Pt. will remain HDS throughout shift      Problem: Skin  Goal: Decreased wound size/increased tissue granulation at next dressing change  Outcome: Progressing  Flowsheets (Taken 3/19/2025 1009)  Decreased wound size/increased tissue granulation at next dressing change:   Utilize specialty bed per algorithm   Promote sleep for wound healing   Protective dressings over bony prominences  Goal: Participates in plan/prevention/treatment measures  Outcome: Progressing  Flowsheets (Taken 3/19/2025 1009)  Participates in plan/prevention/treatment measures:   Elevate heels   Discuss with provider PT/OT consult  Goal: Prevent/manage excess moisture  Outcome: Progressing  Flowsheets (Taken 3/19/2025 1009)  Prevent/manage excess moisture:   Cleanse incontinence/protect with barrier cream   Moisturize dry skin   Use wicking fabric (obtain order)   Monitor for/manage infection if present   Follow provider orders for dressing changes  Goal: Prevent/minimize sheer/friction injuries  Outcome: Progressing  Flowsheets (Taken 3/19/2025 1009)  Prevent/minimize sheer/friction injuries:   Complete micro-shifts as needed if patient unable. Adjust patient position to relieve pressure points, not a full turn   HOB 30 degrees or less   Use pull sheet   Turn/reposition every 2 hours/use positioning/transfer devices   Utilize specialty bed per algorithm  Goal: Promote/optimize nutrition  Outcome: Progressing  Flowsheets (Taken 3/19/2025 1009)  Promote/optimize nutrition:   Discuss with provider if NPO > 2 days   Monitor/record intake including meals  Goal: Promote skin healing  Outcome: Progressing  Flowsheets (Taken 3/19/2025 1009)  Promote skin healing:   Assess skin/pad under line(s)/device(s)   Ensure correct size (line/device) and apply per  instructions   Protective dressings over bony prominences   Rotate device position/do not position  patient on device   Turn/reposition every 2 hours/use positioning/transfer devices     Problem: Pain - Adult  Goal: Verbalizes/displays adequate comfort level or baseline comfort level  Outcome: Progressing     Problem: Safety - Adult  Goal: Free from fall injury  Outcome: Progressing     Problem: Discharge Planning  Goal: Discharge to home or other facility with appropriate resources  Outcome: Progressing     Problem: Chronic Conditions and Co-morbidities  Goal: Patient's chronic conditions and co-morbidity symptoms are monitored and maintained or improved  Outcome: Progressing     Problem: Nutrition  Goal: Nutrient intake appropriate for maintaining nutritional needs  Outcome: Progressing     Problem: Fall/Injury  Goal: Not fall by end of shift  Outcome: Progressing  Goal: Be free from injury by end of the shift  Outcome: Progressing

## 2025-03-19 NOTE — SIGNIFICANT EVENT
RAPID RESPONSE NOTE    Role: DACR     Reason for Rapid Response:   []    BAT []  New CPAP/BiPAP []  Bleeding []  Change in mental status   []  Chest pain []  Code blue []  FiO2 >/= 50% []  HR </= 40 bpm   []  HR >/= 130 bpm []  Hyperglycemia []  Hypoglycemia []  SpO2 < 90%    []  RR </= 8 bpm []  RR >/= 30 bpm [x]  SBP </= 90 mmHg []  Seizure   []  Staff concern:         Vital Signs on Arrival:   Multiple readings of 80s/50s   HR in the 70s-80s   Saturating well on RA   RR 20     Focused Physical Exam:   General: Lethargic, arousable to voice, oriented to self, place, wife, but not year   Cardiac: rrr  Abdomen: Tender on the left side (chronic related to graft), non-tender on the right   Extremities: No edema   Neuro: Face symmetric, pupils equal, strength 5/5 in UE     Differential Diagnosis: Fluid loss due to diarrhea due to norovirus     Plan (Workup/Intervention):  #Hyperk  -ordered K cocktail   -transfer to MICU for possible urgent dialysis     #Hypotension   ::fluid responsive   -s/p 2L NS with improvement     Conclusion:   [x] Patient improved (pertinent vitals Systolic up to 100), will transfer to ICU for hyperkalemia however    ICU Notified:   [x] Yes  [] No    Disposition: MICU     Note: patient developed acute onset chest pain that lasted 1 minute and self resolved. EKG with underlying left bundle, ST above his baseline, but not meeting Sgarbossa criteria. Given resolution in chest pain, monitor EKG and troponin is prudent. MICU team given verbal report. First Troponin pending

## 2025-03-19 NOTE — SIGNIFICANT EVENT
I was notified about patient's Hypotensive event and code white being called. I ordered 500 ml bolus and came to assess patient. BP was significantly low. He was also hyperkalemia. Hyperkalemia treatment protocol was initiated. Due to persistent hypotension, MICU was notified.  Nephro aware. However, patient had dialysis yesterday after which K was normal. K this morning has increased to 7.1. Calcium, lokelma and insulin were ordered. Albumin was ordered to help treat BP. Additional 1 L bolus was ordered. There was some improvement in patient's BP. Initial BP was 77/49. Most recent BP at the time of this note was 94/84. Additional 1 L of NS bolus was ordered. Unsure of patient's volume loss via diarrhea, however, patient needs fluid resuscitation.   Discussed with MICU and was told that patient is accepted to MICU by code white team.  BMP ordered at 1000 am.  Unfortunately, patient can not tolerate dialysis unless BP is corrected.  BP meds has been held.

## 2025-03-19 NOTE — HOSPITAL COURSE
H&P  Manolo Bashir is a 69 year old male with a past medical history of ESRD s/p renal transplants x2, non-functioning: initially in 1992 c/b allograft failure 2006, 2nd transplant 2013, c/b impaired allograft function, currently on iHD since May 2024(TTS); on tacrolimus and prednisone 5mg, MGUS, DMII, hypertension, prostate cancer s/p radical prostatectomy, HCV (treated w/ Harvoni, HCV PCR negative 2019), recent history of C. diff (treated w/ vancomycin PO 10 day course EOT 2/6/25), as well as development of graft intolerance over past 2 months, currently being evaluated for nephrectomy due to persistent graft related left lower quadrant pain, and gastroparesiswho presented to [facility] with a chief complaint of missed dialysis due to 2-3 weeks of on going diarrhea following recent discharge.      On evaluation, patient recently received morphine and is somnolent and a poor/unreliable historian. History obtained via patient and SO at bedside. On interview, patient reports that he was recommended to discharge to SNF following mot recent discharge but elected for dispo to home. He reports that he intervally developed diarrhea ~2-3 weeks ago and attributes this diarrhea to the initiation of PO abx during his last hospitalization. He reports 8-10 watery/loose diarrhea episodes daily. He denies any further infectious sx including fevers/chills, NS, and myalgias. He does not fatigue. He deneis any recent travel, new rashes, sick contact, or changes in diet. He denies joint pain and new supplementation. He reports chronic LLQ abdominal pain. He reports that he has had nausea and emesis, and has been unable to tolerate PO intake since this AM. He endorses several lb weight loss and lethargy. He reports his last HD session was 03/15. He denies a history of pancreatitis and alchol abuse. He denies hot/cold intolerance.     Initial labs significant for hyperkalemia (5.5->6.2), WBC 3.9, Hgb 12.5, plt 103, Cre 11.89, bicarb 21, Na  136.     He was initially admitted to medicine and received dialysis through his AVF on 3/18 evening (no fluid removal). K was noted to be 5.0 following dialysis.     On 3/19 AM, patient was hypotensive with BP 77/49. Given 500cc and noted to be hyperkalemic to  7.1. Given calcium, lokelma, insulin. 2nd liter of fluid initiated. Concern that patient could not receive dialysis for hyperkalemia on the floor given low BP so transferred to the MICU. Just prior to transporting to MICU, patient began c/o midsternal chest pain accompanied with belching- 12 lead ECG obtained and troponin drawn.        Per chart review, has had several recent admissions here at [facility] since 01/2025:     01/0505/08/2025- presented with LLQ abdominal pain and hematuria. CT abdomen pelvis with fat stranding adjacent to the left iliac fossa renal transplant concerning for pyelonephritis, no evidence of any perinephric fluid collection was noted. Evalauted by Transplant Nephrology who rec Solumedrol 125 mg/day x 3 days (1/6-1/8/25) then prednisone 40 mg/day x 5 days then 20 mg/day x 5 days then 10 mg/day x 5 days then keep at prednisone 5 mg/day. Patient continued on TAC 0.5 mg.  Also treated with empiric Abx d/t c/f Pyelonephritis, but discontinued after 3 days given reassuring workup.   01/27-02/05- presented with SOB, nausea/vomiting, diarrhea, and general malaise that started during dialysis. Noted to be febrile and hypoxic on presentation with workup concerning for FO vs PNA managed with iHD and IV abx--> Levaquin. Found to have positive C. Diff PCR so oral vancomycin was started for 10 days total. CT A/P was obtained d/t persistent LLQ ab pain which was concerning for edema of the transplanted kidney. Transplant surgery was consulted for potential nephrectomy. ID was also consulted due to concern for pneumonia/colitis in an immunocompromised patient however they were less concerned for pneumonia given improvement in his CXR so pneumonia coverage  was discontinued. Ultimately managed with prednisone taper and patient's abdominal pain significantly improved. His diarrhea also improved with oral vancomycin.  02/28-03/06- presented with chest pain, found to be fluid ocerloaded, but also with concern for oppurtunistic infection given immunocompromised state. Evaluated by Pulmonary Medicine c/d d/t GGO and c/f atypical infection who deferred thoracentesis as effusions improved with HD and bronchoscopy deferred d/t low likelihood of infection. Evalauated by ID who recommended isavuconazole (3 month course) and augmentin for 6 week course due to c/f atypical PNA. He was evaluated by transplant surgery but did not have inpatient Nephrectomy.

## 2025-03-20 ENCOUNTER — APPOINTMENT (OUTPATIENT)
Dept: CARDIOLOGY | Facility: HOSPITAL | Age: 70
DRG: 640 | End: 2025-03-20
Payer: MEDICARE

## 2025-03-20 ENCOUNTER — APPOINTMENT (OUTPATIENT)
Dept: SURGERY | Facility: CLINIC | Age: 70
End: 2025-03-20
Payer: MEDICARE

## 2025-03-20 LAB
ALBUMIN SERPL BCP-MCNC: 3.2 G/DL (ref 3.4–5)
ALBUMIN SERPL BCP-MCNC: 3.3 G/DL (ref 3.4–5)
ANION GAP SERPL CALC-SCNC: 16 MMOL/L (ref 10–20)
ANION GAP SERPL CALC-SCNC: 16 MMOL/L (ref 10–20)
ATRIAL RATE: 78 BPM
ATRIAL RATE: 84 BPM
BUN SERPL-MCNC: 13 MG/DL (ref 6–23)
BUN SERPL-MCNC: 14 MG/DL (ref 6–23)
CALCIUM SERPL-MCNC: 9.1 MG/DL (ref 8.6–10.6)
CALCIUM SERPL-MCNC: 9.2 MG/DL (ref 8.6–10.6)
CARDIAC TROPONIN I PNL SERPL HS: 249 NG/L (ref 0–53)
CARDIAC TROPONIN I PNL SERPL HS: 281 NG/L (ref 0–53)
CHLORIDE SERPL-SCNC: 97 MMOL/L (ref 98–107)
CHLORIDE SERPL-SCNC: 97 MMOL/L (ref 98–107)
CHLORIDE STL-SCNC: 85 MMOL/L
CO2 SERPL-SCNC: 26 MMOL/L (ref 21–32)
CO2 SERPL-SCNC: 28 MMOL/L (ref 21–32)
CREAT SERPL-MCNC: 5.13 MG/DL (ref 0.5–1.3)
CREAT SERPL-MCNC: 6.69 MG/DL (ref 0.5–1.3)
EGFRCR SERPLBLD CKD-EPI 2021: 12 ML/MIN/1.73M*2
EGFRCR SERPLBLD CKD-EPI 2021: 8 ML/MIN/1.73M*2
GLUCOSE BLD MANUAL STRIP-MCNC: 112 MG/DL (ref 74–99)
GLUCOSE BLD MANUAL STRIP-MCNC: 116 MG/DL (ref 74–99)
GLUCOSE BLD MANUAL STRIP-MCNC: 117 MG/DL (ref 74–99)
GLUCOSE BLD MANUAL STRIP-MCNC: 145 MG/DL (ref 74–99)
GLUCOSE BLD MANUAL STRIP-MCNC: 155 MG/DL (ref 74–99)
GLUCOSE BLD MANUAL STRIP-MCNC: 181 MG/DL (ref 74–99)
GLUCOSE SERPL-MCNC: 111 MG/DL (ref 74–99)
GLUCOSE SERPL-MCNC: 94 MG/DL (ref 74–99)
LACTOFERRIN STL QL IA: NEGATIVE
MAGNESIUM SERPL-MCNC: 2.07 MG/DL (ref 1.6–2.4)
P AXIS: 42 DEGREES
P AXIS: 74 DEGREES
P OFFSET: 189 MS
P OFFSET: 194 MS
P ONSET: 133 MS
P ONSET: 133 MS
PHOSPHATE SERPL-MCNC: 3.1 MG/DL (ref 2.5–4.9)
PHOSPHATE SERPL-MCNC: 4.1 MG/DL (ref 2.5–4.9)
POTASSIUM SERPL-SCNC: 3.7 MMOL/L (ref 3.5–5.3)
POTASSIUM SERPL-SCNC: 4.3 MMOL/L (ref 3.5–5.3)
POTASSIUM STL-SCNC: 15 MMOL/L
PR INTERVAL: 168 MS
PR INTERVAL: 170 MS
Q ONSET: 217 MS
Q ONSET: 218 MS
QRS COUNT: 13 BEATS
QRS COUNT: 14 BEATS
QRS DURATION: 142 MS
QRS DURATION: 144 MS
QT INTERVAL: 432 MS
QT INTERVAL: 442 MS
QTC CALCULATION(BAZETT): 492 MS
QTC CALCULATION(BAZETT): 522 MS
QTC FREDERICIA: 471 MS
QTC FREDERICIA: 494 MS
R AXIS: -28 DEGREES
R AXIS: -79 DEGREES
SODIUM SERPL-SCNC: 135 MMOL/L (ref 136–145)
SODIUM SERPL-SCNC: 137 MMOL/L (ref 136–145)
SODIUM STL-SCNC: 124 MMOL/L
T AXIS: -1 DEGREES
T AXIS: 26 DEGREES
T OFFSET: 434 MS
T OFFSET: 438 MS
TACROLIMUS BLD-MCNC: 2.5 NG/ML
VENTRICULAR RATE: 78 BPM
VENTRICULAR RATE: 84 BPM

## 2025-03-20 PROCEDURE — 99233 SBSQ HOSP IP/OBS HIGH 50: CPT | Performed by: HOSPITALIST

## 2025-03-20 PROCEDURE — 80197 ASSAY OF TACROLIMUS: CPT

## 2025-03-20 PROCEDURE — 83735 ASSAY OF MAGNESIUM: CPT

## 2025-03-20 PROCEDURE — 2500000001 HC RX 250 WO HCPCS SELF ADMINISTERED DRUGS (ALT 637 FOR MEDICARE OP)

## 2025-03-20 PROCEDURE — 84484 ASSAY OF TROPONIN QUANT: CPT

## 2025-03-20 PROCEDURE — 82947 ASSAY GLUCOSE BLOOD QUANT: CPT

## 2025-03-20 PROCEDURE — 2500000004 HC RX 250 GENERAL PHARMACY W/ HCPCS (ALT 636 FOR OP/ED)

## 2025-03-20 PROCEDURE — 2500000002 HC RX 250 W HCPCS SELF ADMINISTERED DRUGS (ALT 637 FOR MEDICARE OP, ALT 636 FOR OP/ED)

## 2025-03-20 PROCEDURE — 2500000002 HC RX 250 W HCPCS SELF ADMINISTERED DRUGS (ALT 637 FOR MEDICARE OP, ALT 636 FOR OP/ED): Performed by: INTERNAL MEDICINE

## 2025-03-20 PROCEDURE — 1200000002 HC GENERAL ROOM WITH TELEMETRY DAILY

## 2025-03-20 PROCEDURE — 36415 COLL VENOUS BLD VENIPUNCTURE: CPT

## 2025-03-20 PROCEDURE — 2500000004 HC RX 250 GENERAL PHARMACY W/ HCPCS (ALT 636 FOR OP/ED): Performed by: INTERNAL MEDICINE

## 2025-03-20 PROCEDURE — 93005 ELECTROCARDIOGRAM TRACING: CPT

## 2025-03-20 PROCEDURE — 80069 RENAL FUNCTION PANEL: CPT

## 2025-03-20 PROCEDURE — 93010 ELECTROCARDIOGRAM REPORT: CPT | Performed by: INTERNAL MEDICINE

## 2025-03-20 RX ORDER — OXYCODONE HYDROCHLORIDE 5 MG/1
5 TABLET ORAL ONCE
Status: COMPLETED | OUTPATIENT
Start: 2025-03-20 | End: 2025-03-20

## 2025-03-20 RX ORDER — OXYCODONE HYDROCHLORIDE 5 MG/1
TABLET ORAL
Status: COMPLETED
Start: 2025-03-20 | End: 2025-03-20

## 2025-03-20 RX ADMIN — TACROLIMUS 0.5 MG: 0.5 CAPSULE ORAL at 18:25

## 2025-03-20 RX ADMIN — HEPARIN SODIUM 5000 UNITS: 5000 INJECTION, SOLUTION INTRAVENOUS; SUBCUTANEOUS at 13:29

## 2025-03-20 RX ADMIN — PANTOPRAZOLE SODIUM 40 MG: 40 TABLET, DELAYED RELEASE ORAL at 06:22

## 2025-03-20 RX ADMIN — OXYCODONE 5 MG: 5 TABLET ORAL at 00:56

## 2025-03-20 RX ADMIN — PRAVASTATIN SODIUM 10 MG: 20 TABLET ORAL at 22:35

## 2025-03-20 RX ADMIN — FLUOCINONIDE: 0.5 OINTMENT TOPICAL at 08:16

## 2025-03-20 RX ADMIN — OXYCODONE HYDROCHLORIDE 5 MG: 5 TABLET ORAL at 00:56

## 2025-03-20 RX ADMIN — PREDNISONE 5 MG: 5 TABLET ORAL at 08:16

## 2025-03-20 RX ADMIN — ISOSORBIDE MONONITRATE 60 MG: 30 TABLET, EXTENDED RELEASE ORAL at 08:16

## 2025-03-20 RX ADMIN — SODIUM CHLORIDE 1000 ML: 9 INJECTION, SOLUTION INTRAVENOUS at 11:44

## 2025-03-20 RX ADMIN — TACROLIMUS 0.5 MG: 0.5 CAPSULE ORAL at 06:22

## 2025-03-20 RX ADMIN — HEPARIN SODIUM 5000 UNITS: 5000 INJECTION, SOLUTION INTRAVENOUS; SUBCUTANEOUS at 22:35

## 2025-03-20 RX ADMIN — CLOTRIMAZOLE: 1 CREAM TOPICAL at 08:16

## 2025-03-20 RX ADMIN — TRIMETHOBENZAMIDE HYDROCHLORIDE 200 MG: 100 INJECTION INTRAMUSCULAR at 12:02

## 2025-03-20 RX ADMIN — CLOTRIMAZOLE: 1 CREAM TOPICAL at 22:37

## 2025-03-20 RX ADMIN — ACETAMINOPHEN 975 MG: 325 TABLET, FILM COATED ORAL at 12:53

## 2025-03-20 RX ADMIN — FLUOCINONIDE: 0.5 OINTMENT TOPICAL at 22:37

## 2025-03-20 RX ADMIN — ASCORBIC ACID, THIAMINE MONONITRATE,RIBOFLAVIN, NIACINAMIDE, PYRIDOXINE HYDROCHLORIDE, FOLIC ACID, CYANOCOBALAMIN, BIOTIN, CALCIUM PANTOTHENATE, 1 CAPSULE: 100; 1.5; 1.7; 20; 10; 1; 6000; 150000; 5 CAPSULE, LIQUID FILLED ORAL at 09:27

## 2025-03-20 RX ADMIN — INSULIN GLARGINE 3 UNITS: 100 INJECTION, SOLUTION SUBCUTANEOUS at 18:25

## 2025-03-20 RX ADMIN — INSULIN LISPRO 1 UNITS: 100 INJECTION, SOLUTION INTRAVENOUS; SUBCUTANEOUS at 15:43

## 2025-03-20 RX ADMIN — HEPARIN SODIUM 5000 UNITS: 5000 INJECTION, SOLUTION INTRAVENOUS; SUBCUTANEOUS at 06:23

## 2025-03-20 ASSESSMENT — PAIN - FUNCTIONAL ASSESSMENT
PAIN_FUNCTIONAL_ASSESSMENT: 0-10

## 2025-03-20 ASSESSMENT — PAIN SCALES - GENERAL
PAINLEVEL_OUTOF10: 0 - NO PAIN
PAINLEVEL_OUTOF10: 3
PAINLEVEL_OUTOF10: 3
PAINLEVEL_OUTOF10: 8
PAINLEVEL_OUTOF10: 0 - NO PAIN
PAINLEVEL_OUTOF10: 0 - NO PAIN

## 2025-03-20 ASSESSMENT — ACTIVITIES OF DAILY LIVING (ADL): LACK_OF_TRANSPORTATION: NO

## 2025-03-20 ASSESSMENT — PAIN DESCRIPTION - LOCATION: LOCATION: ABDOMEN

## 2025-03-20 NOTE — PROGRESS NOTES
ICU to [facility] Transfer Summary     I:  ICU Admission Reason & Brief ICU Course:    69 year old male with a past medical history of ESRD s/p renal transplants x2, non-functioning: initially in 1992 c/b allograft failure 2006, 2nd transplant 2013, c/b impaired allograft function, currently on iHD since May 2024(TTS); on tacrolimus and prednisone 5mg, MGUS, DMII, hypertension, prostate cancer s/p radical prostatectomy, HCV (treated w/ Aries, HCV PCR negative 2019), recent history of C. diff admitted to the MICU on 3/18 for hypotension and urgent need for dialysis due to stenosed fistula and hyperkalemia. He received dialysis via his fistula in MICU. Potassium normalized. Positive for rotavirus.       C: Code Status/DPOA Info/Goals of Care/ACP Note    Full Code  DPOA/Contact Number: Purnima Bashir   [phone]     U: Unprescribing & Pertinent High-Risk Medications    Changes to home meds: none     Anticoagulation: Yes - [facility]    Antibiotics:   [x] N/A - no current planned antimicrobioals      P: Pending Tests at the Time of Transfer   none      A: Active consultants, including Rehab:   []  Subspecialty Consultants: nephrology  [x]  PT  [x]  OT  []  SLP  []  Wound Care    U: Uncertainty Measure/Diagnostic Pause:    Working diagnosis at the time of transfer hyperkalemia, ESRD s/p transplant, though ddx includes      Diagnosis Degree of Certainty: 1. High degree of certainty about the clinical diagnosis.     S: Summary of Major Problems and To-Dos:   Plan:  NEUROLOGY/PSYCH:        SHYAM     CARDIOVASCULAR:  #Troponemia   :: Patient with EKG concerning for ST elevation in V2/V3 repeat showed improvement.   :: Troponin increasing 130 139-->171--> 224  - Will continue to trend troponin      #HTN   Hold home Coreg 37.5 mg BID given hypotension   Hold Nifedipine 90,   Hold Imdur 60 mg      PULMONARY:  #Small right Pleural effusion   :: CT abdomen pelvis completed 3/18/25:  Small right pleural effusion.   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   :: Potassium 7.1--> 6.8--> 5.7 s/p 2 hyper K cocktails   ::  - Dialysis 3/19 overnight, ran even   - LUE ultrasound to assess for AVF stenosis or clot   - daily AM tacro levels     - c/w Pred and tacro for now  - Tx Nephrology will follow     GASTROENTEROLOGY:  #Nausea/Vomiting   :: EKG    #Gastroparesis   - hold home metoclopramide 5 mg TID for now, follow up ECG for QTc     ENDOCRINOLOGY:  #TIIDM   :: A21C 9.1 (12/2024)  :: Home reg- Lantus 6 units qam, SSI      Plan:  - on CLD   - Lantus 3 units every day, SSI   - POC Glucose AC at bedtime   - titrate to -180      HEMATOLOGY:  SHYAM      MUSCULOSKELETAL/ SKIN:  #Hypopigmentation on anterior shins bilaterally      INFECTIOUS DISEASE:  #Hx of C dif   #C/f oppurtunistic respiratory infection   :: Cdif PCR positive, C dif EIA negative, stool pathogen panel negative, stool O/P negative (01/2025)  :: s/p 10 days PO Vanc (01-02/2025)  :: Cdif PCR negative in the ED  :: CT A/P without evidence of acute colonic pathology   :: Histoplasma antigen negative, BD-glucan negative, aspergillus negative, blastomyces abx negative, T spot negative (02-03/2025)  :: C. Diff is negative,   :: + For Rotavirus      To-do list prior to transfer:  []Trend troponin   []Monitor RFP, follow up renal recommendations      E: Exam, including Lines/Drains/Airways & Data Review:   Gen: NAD, resting comfortably in room  CV: RRR  Pulm: breathing comfortably   Neuro: alert, oriented     Difficult airway? No  Lines/drains assessed for removal? No    Within 30 minutes of the patient physically leaving the floor, a Floor Readiness Note needs to be placed with updated vitals.

## 2025-03-20 NOTE — PROGRESS NOTES
Communication Note    Patient Name: Manolo Bashir  MRN: [id number]  Today's Date: 3/20/2025   Room: 22/22-A    Discipline: Physical Therapy      PT Missed Visit: Yes  Missed Visit Reason:  (PT evaluation reviewed, patient with continued uptrending troponin.  Will continue to monitor and follow up as appropriate.)      03/20/25 at 8:54 AM   Leobardo Wallace, PT   Rehab Office: [phone]

## 2025-03-20 NOTE — NURSING NOTE
Report to Receiving RN:    Report To: Yanna ROLLINS  Time Report Called: [id number] @ bedside  Hand-Off Communication: tolerated treatment, ran even, VSS  Complications During Treatment: No  Ultrafiltration Treatment: No  Medications Administered During Dialysis: No  Blood Products Administered During Dialysis: No  Labs Sent During Dialysis: No  Heparin Drip Rate Changes: N/A  Dialysis Catheter Dressing: N/A  Last Dressing Change: N/A

## 2025-03-20 NOTE — NURSING NOTE
Report from Sending RN:    Report From: Yanna ROLLINS  Recent Surgery of Procedure: No  Baseline Level of Consciousness (LOC): A&O X4  Oxygen Use: No  Type: pulse ox 99 % onroom    Diabetic: Yes, blood sugar 96  Last BP Med Given Day of Dialysis: none  Last Pain Med Given: none  Lab Tests to be Obtained with Dialysis: No  Blood Transfusion to be Given During Dialysis: No  Available IV Access: Yes  Medications to be Administered During Dialysis: No  Continuous IV Infusion Running: No  Restraints on Currently or in the Last 24 Hours: No  Hand-Off Communication: Full Code, admitted for hyperkalemia, hypotension, missed dialysis session, Droplet  and Contact Precautions, roto virus, hx failed kidney transplant, ESRD, HTN, DM  Dialysis Catheter Dressing: N/A  Last Dressing Change: N/A

## 2025-03-20 NOTE — PROGRESS NOTES
Floor Readiness Note       I, personally, evaluated Manolo Bashir prior to transfer to the floor, including reviewing all current laboratory and imaging studies. The patient remains appropriate for transfer to the floor. Bedside nurse and respiratory therapy are also in agreement of patient's readiness for the floor.     Brief summary:  69 year old male with a past medical history of ESRD s/p renal transplants x2, non-functioning: initially in 1992 c/b allograft failure 2006, 2nd transplant 2013, c/b impaired allograft function, currently on iHD since May 2024(TTS); on tacrolimus and prednisone 5mg, MGUS, DMII, hypertension, prostate cancer s/p radical prostatectomy, HCV (treated w/ Aries, HCV PCR negative 2019), recent history of C. diff admitted to the MICU on 3/18 for hypotension and urgent need for dialysis due to stenosed fistula and hyperkalemia. He received dialysis via his fistula in MICU. Potassium normalized. Positive for rotavirus.     Updated focused Physical Exam:  Gen: NAD, resting comfortably in room  CV: RRR  Pulm: breathing comfortably   GI: Soft with mild tenderness at LLQ   Neuro: alert, oriented        Current Vital Signs:  Heart Rate: 82 (03/20/25 0600 : User, System Default)  BP: 119/66 (03/20/25 0600 : User, System Default)  Temp: 35.9 °C (96.6 °F) (03/20/25 0300 : Mikea Tobias)  Resp: 13 (03/20/25 0600 : User, System Default)  SpO2: 97 % (03/20/25 0600 : User, System Default)    Relevant updates since rounds:  General NAD  CV RRR   Pulm Lungs CTA  Neuro AxOx4 Moving all extremities      - Given 1 L NS   - Accepting team will need to reach out to IR or transplant surgery regarding angioplasty/stenting for fistula   - Trend troponin repeat EKG if continues to rise   - Continue to monitor potassium with RFP' s    Accepting team, {Team:[id number]}, received verbal sign out and the Provider Care team/Attending has been updated. Bedside nurse will now call accepting nurse for report and patient will  be transferred to {Location:[id number]} ***.    Negin Roach MD  PGY-1 Internal Medicine Resident

## 2025-03-20 NOTE — CONSULTS
"Nutrition Initial Assessment:   Nutrition Assessment    Reason for Assessment: Admission nursing screening    Patient is a 69 y.o. male presenting with low BP and hyperkalemia and urgent need for HD.  Pt has had diarrhea for the last 2-3 weeks which has prevented him from attending HD.    ESRD s/p renal transplants x2, non-functioning: initially in 1992 c/b allograft failure 2006, 2nd transplant 2013, c/b impaired allograft function, currently on iHD since May 2024(TTS); on tacrolimus and prednisone 5mg, MGUS, DMII, hypertension, prostate cancer s/p radical prostatectomy, HCV (treated w/ Harvoni, HCV PCR negative 2019), recent history of C. diff (treated w/ vancomycin PO 10 day course EOT 2/6/25), as well as development of graft intolerance over past 2 months     Nutrition History:  Food and Nutrient History: Met with patient, two family members were present.  Pt seemed uncomfortable during interview.  He reports a poor PO intake now for a while, in part d/t diarrhea.  Does not take Boost or Ensure as this causes him to have diarrhea.  He is down 30#  in kiran last 10months, he reports since starting \"the machine\" (?HD) in May 2024.       Anthropometrics:  Height: 180.3 cm (5' 11\")   Weight: 65.8 kg (145 lb)   BMI (Calculated): 20.23  IBW/kg (Dietitian Calculated): 78.2 kg  Percent of IBW: 84 %     Weight History:     3/19/25: 65.8kg (admit)  2/4/25: 73.3kg  1/5/25: 71.7kg  12/4/24: 73.9kg  10/2/24: 70.8kg  9/4/24: 70kg  8/9/24: 71.4kg  6/3/24: 72.6kg  4/3/24: 77.1kg  3/7/24: 79.1kg    Pt says he is normally 77.3kg and thinks he is closer to 63.6kg now.  He is down 15% in weight from his usual and last weighed this about 11months ago.  Also noted to be down 10% in about 6 weeks.     Weight Change %:       Nutrition Focused Physical Exam Findings:    Subcutaneous Fat Loss:   Orbital Fat Pads: Severe (dark circles, hollowing and loose skin)  Buccal Fat Pads: Severe (hollow, sunken and narrow face)  Triceps: Severe " (negligible fat tissue)  Muscle Wasting:  Temporalis: Severe (hollowed scooping depression)  Pectoralis (Clavicular Region): Severe (protruding prominent clavicle)  Deltoid/Trapezius: Severe (squared shoulders, acromion process prominent)  Quadriceps: Severe (depressions on inner and outer thigh)  Gastrocnemius: Severe (minimal muscle definition)  Edema:  Edema Location: generalized edema  Physical Findings:  Skin: Negative    Nutrition Significant Labs:  A1C:  Lab Results   Component Value Date    HGBA1C 9.1 (H) 12/16/2024   , BG POCT trend:   Results from last 7 days   Lab Units 03/20/25  1144 03/20/25  0813 03/20/25  0414 03/19/25  2334 03/19/25  1948   POCT GLUCOSE mg/dL 145* 117* 116* 98 96    , Renal Lab Trend:   Results from last 7 days   Lab Units 03/20/25  0448 03/19/25  2056 03/19/25  1606 03/19/25  1318   POTASSIUM mmol/L 4.3 3.7 6.1* 5.7*   PHOSPHORUS mg/dL 4.1 3.1 4.5 3.9   SODIUM mmol/L 135* 137 135* 137   MAGNESIUM mg/dL 2.07  --   --   --    EGFR mL/min/1.73m*2 8* 12* 4* 4*   BUN mg/dL 14 13 36* 35*   CREATININE mg/dL 6.69* 5.13* 11.76* 11.33*    , Vit D:   Lab Results   Component Value Date    VITD25 27 (L) 12/16/2024        Nutrition Specific Medications:  Scheduled medications  [Held by provider] carvedilol, 37.5 mg, oral, BID  [Held by provider] cinacalcet, 30 mg, oral, Daily  clotrimazole, , Topical, BID  dextrose, 25 g, intravenous, Once  famotidine, 20 mg, intravenous, Once  fluocinonide, , Topical, BID  heparin (porcine), 5,000 Units, subcutaneous, q8h ZOË  imiquimod, 1 packet, Topical, Once per day on Monday Wednesday Friday  insulin glargine, 3 Units, subcutaneous, q24h  insulin lispro, 0-5 Units, subcutaneous, TID AC  isosorbide mononitrate ER, 60 mg, oral, Daily  [Held by provider] NIFEdipine ER, 60 mg, oral, BID  pantoprazole, 40 mg, oral, Daily before breakfast  pravastatin, 10 mg, oral, Nightly  predniSONE, 5 mg, oral, Daily  [Held by provider] sevelamer carbonate, 800 mg, oral, TID  AC  sodium zirconium cyclosilicate, 10 g, oral, q8h  tacrolimus, 0.5 mg, oral, BID  vitamin B complex-vitamin C-folic acid, 1 capsule, oral, Daily      Continuous medications     PRN medications  PRN medications: acetaminophen, dextrose, dextrose, glucagon, glucagon, trimethobenzamide     I/O:   Last BM Date: 03/20/25; Stool Appearance: Loose (03/20/25 1300)        Dietary Orders (From admission, onward)       Start     Ordered    03/20/25 0810  Adult diet Clear Liquid, Renal; Potassium Restricted 2 gm (50mEq); 2 - 3 grams Sodium  Diet effective now        Question Answer Comment   Diet type Clear Liquid    Diet type Renal    Potassium restriction: Potassium Restricted 2 gm (50mEq)    Sodium restriction: 2 - 3 grams Sodium        03/20/25 0809    03/19/25 1003  May Not Participate in Room Service  ( ROOM SERVICE MAY NOT PARTICIPATE)  Once        Question:  .  Answer:  Yes    03/19/25 1002                     Estimated Needs:   Total Energy Estimated Needs in 24 hours (kCal):  (2099-0980)  Method for Estimating Needs: MSJ= 1455  Total Protein Estimated Needs in 24 Hours (g):  ()  Method for Estimating 24 Hour Protein Needs: 1.3-1.5 x 65.8kg            Nutrition Diagnosis   Malnutrition Diagnosis  Patient has Malnutrition Diagnosis: Yes  Diagnosis Status: New  Malnutrition Diagnosis: Severe malnutrition related to chronic disease or condition  As Evidenced by: pt reports a poor appetite and intake PTA; he is down in weight by 15% in close to one year with noted areas of severe fat and muscle wasting on physical exam        Nutrition Interventions/Recommendations         Nutrition Prescription:   Consider diet advancement to just a low potassium diet and allowing pt to select items he can tolerated if no testing/procedures are planned that require clear liquids.   Recheck Vitamin D level.          Nutrition Interventions:    No supplements at this time as they cause him diarrhea and he already has diarrhea        Nutrition Education:   Not appropriate       Nutrition Monitoring and Evaluation   Food/Nutrient Related History Monitoring  Monitoring and Evaluation Plan: Estimated Energy Intake (and ability to advance pt's PO diet)  Estimated Energy Intake: Energy intake 50 -75% of estimated energy needs         Time Spent (min): 60 minutes

## 2025-03-20 NOTE — SIGNIFICANT EVENT
Floor Readiness Note       I, personally, evaluated Manolo Bashir prior to transfer to the floor, including reviewing all current laboratory and imaging studies. The patient remains appropriate for transfer to the floor. Bedside nurse and respiratory therapy are also in agreement of patient's readiness for the floor.     Brief summary:  69 year old male with a past medical history of ESRD s/p renal transplants x2, non-functioning: initially in 1992 c/b allograft failure 2006, 2nd transplant 2013, c/b impaired allograft function, currently on iHD since May 2024(TTS); on tacrolimus and prednisone 5mg, MGUS, DMII, hypertension, prostate cancer s/p radical prostatectomy, HCV (treated w/ Aries, HCV PCR negative 2019), recent history of C. diff admitted to the MICU on 3/18 for hypotension and urgent need for dialysis due to stenosed fistula and hyperkalemia. He received dialysis via his fistula in MICU. Potassium normalized. Positive for rotavirus.     Updated focused Physical Exam:  Gen: NAD, resting comfortably in room  CV: RRR  Pulm: breathing comfortably   GI: Soft with mild tenderness at LLQ   Neuro: alert, oriented        Current Vital Signs:  Heart Rate: 81 (03/20/25 1400 : User, System Default)  BP: 118/73 (03/20/25 1400 : User, System Default)  Temp: 36.3 °C (97.3 °F) (03/20/25 1200 : Caroline Esteban)  Resp: 17 (03/20/25 1400 : User, System Default)  SpO2: 99 % (03/20/25 1400 : User, System Default)    Relevant updates since rounds:  General NAD  CV RRR   Pulm Lungs CTA  Neuro AxOx4 Moving all extremities      - Given 1 L NS   - Accepting team will need to reach out to IR or transplant surgery regarding angioplasty/stenting for fistula   - Trend troponin repeat EKG if continues to rise   - Continue to monitor potassium with RFP' s    Accepting team, Hospitalist C, received verbal sign out and the Provider Care team/Attending has been updated. Bedside nurse will now call accepting nurse for report and patient  will be transferred to [place] [id number].    Jerrell Santa MD  PGY-2 Internal Medicine Resident

## 2025-03-20 NOTE — PROGRESS NOTES
Transplant Nephrology progress note     Date of admission: 3/18/2025     Manolo Bashir is a 69 y.o.  with PMH   Past Medical History:   Diagnosis Date    Anemia     Arthritis     Cataract     Chronic kidney disease, stage 3 unspecified (Multi) 09/26/2018    Stage 3 chronic kidney disease    CKD (chronic kidney disease)     stage V    Cough 02/12/2024    COVID-19 06/18/2020    COVID-19 virus infection    Diabetes (Multi)     ESRD (end stage renal disease) (Multi)     Focal and segmental proliferative glomerulonephritis 12/23/2023    HTN (hypertension)     Hyperlipidemia     Other long term (current) drug therapy 07/20/2021    High risk medication use    Personal history of other diseases of the circulatory system     Personal history of cardiac murmur    Personal history of other infectious and parasitic diseases 08/17/2015    History of hepatitis    Polyp, colonic 08/17/2023    Primary osteoarthritis of both ankles 08/17/2023    Prostate cancer (Multi)     Tubular adenoma of colon 08/17/2023    Unspecified kidney failure 08/17/2016    Renal failure        SUBJECTIVE:  Still having diarrhea. Otherise no acute events overnight       PROBLEM LIST:  Assessment & Plan  Hyperkalemia           ALLERGIES:  No Known Allergies         CURRENT MEDICATIONS:  Scheduled medications  [Held by provider] carvedilol, 37.5 mg, oral, BID  [Held by provider] cinacalcet, 30 mg, oral, Daily  clotrimazole, , Topical, BID  dextrose, 25 g, intravenous, Once  famotidine, 20 mg, intravenous, Once  fluocinonide, , Topical, BID  heparin (porcine), 5,000 Units, subcutaneous, q8h ZOË  imiquimod, 1 packet, Topical, Once per day on Monday Wednesday Friday  insulin glargine, 3 Units, subcutaneous, q24h  insulin lispro, 0-5 Units, subcutaneous, TID AC  isosorbide mononitrate ER, 60 mg, oral, Daily  [Held by provider] NIFEdipine ER, 60 mg, oral, BID  pantoprazole, 40 mg, oral, Daily before breakfast  pravastatin, 10 mg, oral, Nightly  predniSONE, 5  "mg, oral, Daily  [Held by provider] sevelamer carbonate, 800 mg, oral, TID AC  sodium zirconium cyclosilicate, 10 g, oral, q8h  tacrolimus, 0.5 mg, oral, BID  vitamin B complex-vitamin C-folic acid, 1 capsule, oral, Daily      Continuous medications       PRN medications  PRN medications: acetaminophen, dextrose, dextrose, glucagon, glucagon, trimethobenzamide       OBJECTIVE:    VITALS: Visit Vitals  /74   Pulse 76   Temp 36.3 °C (97.3 °F)   Resp (!) 0   Ht 1.803 m (5' 11\")   Wt 65.8 kg (145 lb)   SpO2 100%   BMI 20.22 kg/m²   Smoking Status Never   BSA 1.82 m²        General: No distress   CVS: S1 S2 no murmurs  RESP:  Lungs clear to auscultation   ABDO: Soft, non-tender   Neuro: A + O x 3  Skin: No rash   Extremities: No edema  Access: LUE AVF       LABS:  Results from last 72 hours   Lab Units 03/20/25  0448 03/19/25  0846 03/19/25  0717   WBC AUTO x10*3/uL  --   --  4.9   HEMOGLOBIN g/dL  --   --  12.6*   MCV fL  --   --  83   PLATELETS AUTO x10*3/uL  --   --  107*   BUN mg/dL 14   < > 36*   CREATININE mg/dL 6.69*   < > 11.83*   CALCIUM mg/dL 9.2   < > 9.9   TACROLIMUS ng/mL 2.5  --  2.9    < > = values in this interval not displayed.            Intake/Output Summary (Last 24 hours) at 3/20/2025 1313  Last data filed at 3/20/2025 1253  Gross per 24 hour   Intake 1600 ml   Output 400 ml   Net 1200 ml          ASSESSMENT AND PLAN:  Manolo Bashir is a 69 y.o. male presenting with LLQ allograft tenderness and gross hematuria.  He has ESKD attributed to hypertension since 1998 s/p kidney transplant #1 3/26/1992 that failed 11/13/2006), s/p kidney transplant #2 7/13/2013 which failed in May 2024 following which he has been on HD (TTS, [facility] via LUE AVG) . He also has known MGUS with IgG and IgA lambda (bone marrow bx 10/2023 with no plasma cell dyscrasia), DM2, HTN, prostate cancer s/p radical prostatectomy, baseline urinary incontinence, HCV s/p treatment (recent HCV PCR negative 7/2024).        Pt " presented with missed dialysis due to diarrhea which he thinks is from recent initiation of abx. Pt had K 5.5. Had 2 hour HD session via AVF. Repeat BMP showed K 6.2. Given hyperK cocktail. Admitted to MICU for hyperkalemia.   Gets Hd at [facility], nephrologist Dr. Bridges. DW 69kg.   While in the ED he was initially hypertensive, he received his Coreg and nifedipine but then this morning pt developed hypotension, got 1L NS IVF. BP stable at this time.     #ESRD TTS   #Hyperkalemia - improved   -HD at [facility], nephrologist Dr. Bridges, DW 69kg  -IHD 3/19  -AVF US - stenosis at arterial anastamosis     #Diarrhea  -CMV, C diff negative  -Rotavirus positive      # Immunosuppression-Tac 0.5mg BID, Pred 5mg daily  #Graft intolerance/chronic rejection - transplant nephrectomy outpatient planned    #Hypertension -> Hypotension - holding Coreg 25 mg BID, Nifedipine 90 mg BID   #CKD-MBD - Calcitriol 0.5 mg/day, Sensipar 30 mg/day    Recs:  -No indication for iHD today, plan for IHD tomorrow. Will likely switch to TTS schedule next week   -Pt still appears hypovolemic and is hypotensive. Recommend 1L NS  -with high grade stenosis of AVF recommend further evaluation by IR for possible balloon angioplasty   -strict I/O  -will follow        Hans Macias DO   Nephrology fellow PGY 5   Available via Epic Chat   Transplant pager #[phone]

## 2025-03-20 NOTE — PROGRESS NOTES
SOCIAL WORK NOTE      03/20/25 1223   Discharge Planning   Living Arrangements Spouse/significant other   Support Systems Spouse/significant other;Children   Assistance Needed independent   Type of Residence Private residence   Home or Post Acute Services None   Expected Discharge Disposition Home   Does the patient need discharge transport arranged? Yes   RoundTrip coordination needed? Yes   Has discharge transport been arranged? No   Financial Resource Strain   How hard is it for you to pay for the very basics like food, housing, medical care, and heating? Not hard   Housing Stability   In the last 12 months, was there a time when you were not able to pay the mortgage or rent on time? N   In the past 12 months, how many times have you moved where you were living? 0   At any time in the past 12 months, were you homeless or living in a shelter (including now)? N   Transportation Needs   In the past 12 months, has lack of transportation kept you from medical appointments or from getting medications? no   In the past 12 months, has lack of transportation kept you from meetings, work, or from getting things needed for daily living? No     NOK: Children (has 4+)  DME: denied  Home Care: denied  HD: [facility]  PCP: Elma Hutton CNP  Additional information:  SW attempted to meet with patient at bedside, but he was asleep. SW called daughter Purnima, who directed SW to [facility]. SW Spoke with SO via phone. She states that she lives with patient. He is typically independent at baseline. She drives him to HD. No reported issues with SDOH when prompted. Currently denied needs for social work. Social work to follow.   Iraida Hamlin, RU, FLORENTINO-S ([id number])

## 2025-03-21 DIAGNOSIS — R91.8 GROUND GLASS OPACITY PRESENT ON IMAGING OF LUNG: ICD-10-CM

## 2025-03-21 LAB
ALBUMIN SERPL BCP-MCNC: 3 G/DL (ref 3.4–5)
ANION GAP BLDV CALCULATED.4IONS-SCNC: 11 MMOL/L (ref 10–25)
ANION GAP SERPL CALC-SCNC: 15 MMOL/L (ref 10–20)
BASE EXCESS BLDV CALC-SCNC: -3.1 MMOL/L (ref -2–3)
BASOPHILS # BLD AUTO: 0.01 X10*3/UL (ref 0–0.1)
BASOPHILS NFR BLD AUTO: 0.5 %
BODY TEMPERATURE: 37 DEGREES CELSIUS
BUN SERPL-MCNC: 23 MG/DL (ref 6–23)
BURR CELLS BLD QL SMEAR: NORMAL
CA-I BLDV-SCNC: 1.11 MMOL/L (ref 1.1–1.33)
CALCIUM SERPL-MCNC: 8.7 MG/DL (ref 8.6–10.6)
CHLORIDE BLDV-SCNC: 96 MMOL/L (ref 98–107)
CHLORIDE SERPL-SCNC: 99 MMOL/L (ref 98–107)
CO2 SERPL-SCNC: 25 MMOL/L (ref 21–32)
CREAT SERPL-MCNC: 8.57 MG/DL (ref 0.5–1.3)
CRYPTOSP AG STL QL IA: NEGATIVE
EGFRCR SERPLBLD CKD-EPI 2021: 6 ML/MIN/1.73M*2
EOSINOPHIL # BLD AUTO: 0.2 X10*3/UL (ref 0–0.7)
EOSINOPHIL NFR BLD AUTO: 9.4 %
ERYTHROCYTE [DISTWIDTH] IN BLOOD BY AUTOMATED COUNT: 16.6 % (ref 11.5–14.5)
G LAMBLIA AG STL QL IA: NEGATIVE
GLUCOSE BLD MANUAL STRIP-MCNC: 122 MG/DL (ref 74–99)
GLUCOSE BLD MANUAL STRIP-MCNC: 137 MG/DL (ref 74–99)
GLUCOSE BLD MANUAL STRIP-MCNC: 267 MG/DL (ref 74–99)
GLUCOSE BLD MANUAL STRIP-MCNC: 305 MG/DL (ref 74–99)
GLUCOSE BLDV-MCNC: 108 MG/DL (ref 74–99)
GLUCOSE SERPL-MCNC: 120 MG/DL (ref 74–99)
HBV SURFACE AB SER-ACNC: 13.4 MIU/ML
HCO3 BLDV-SCNC: 23.7 MMOL/L (ref 22–26)
HCT VFR BLD AUTO: 30.7 % (ref 41–52)
HCT VFR BLD EST: 60 % (ref 41–52)
HGB BLD-MCNC: 9.8 G/DL (ref 13.5–17.5)
HGB BLDV-MCNC: 19.9 G/DL (ref 13.5–17.5)
IMM GRANULOCYTES # BLD AUTO: 0.01 X10*3/UL (ref 0–0.7)
IMM GRANULOCYTES NFR BLD AUTO: 0.5 % (ref 0–0.9)
INHALED O2 CONCENTRATION: 25 %
LACTATE BLDV-SCNC: 2 MMOL/L (ref 0.4–2)
LYMPHOCYTES # BLD AUTO: 0.68 X10*3/UL (ref 1.2–4.8)
LYMPHOCYTES NFR BLD AUTO: 31.9 %
MAGNESIUM SERPL-MCNC: 2.04 MG/DL (ref 1.6–2.4)
MCH RBC QN AUTO: 28 PG (ref 26–34)
MCHC RBC AUTO-ENTMCNC: 31.9 G/DL (ref 32–36)
MCV RBC AUTO: 88 FL (ref 80–100)
MONOCYTES # BLD AUTO: 0.26 X10*3/UL (ref 0.1–1)
MONOCYTES NFR BLD AUTO: 12.2 %
NEUTROPHILS # BLD AUTO: 0.97 X10*3/UL (ref 1.2–7.7)
NEUTROPHILS NFR BLD AUTO: 45.5 %
NRBC BLD-RTO: 0 /100 WBCS (ref 0–0)
OVALOCYTES BLD QL SMEAR: NORMAL
OXYHGB MFR BLDV: 81.6 % (ref 45–75)
PCO2 BLDV: 47 MM HG (ref 41–51)
PH BLDV: 7.31 PH (ref 7.33–7.43)
PHOSPHATE SERPL-MCNC: 5.7 MG/DL (ref 2.5–4.9)
PLATELET # BLD AUTO: 93 X10*3/UL (ref 150–450)
PO2 BLDV: 57 MM HG (ref 35–45)
POLYCHROMASIA BLD QL SMEAR: NORMAL
POTASSIUM BLDV-SCNC: 4.4 MMOL/L (ref 3.5–5.3)
POTASSIUM SERPL-SCNC: 4.6 MMOL/L (ref 3.5–5.3)
RBC # BLD AUTO: 3.5 X10*6/UL (ref 4.5–5.9)
RBC MORPH BLD: NORMAL
SAO2 % BLDV: 85 % (ref 45–75)
SCHISTOCYTES BLD QL SMEAR: NORMAL
SODIUM BLDV-SCNC: 126 MMOL/L (ref 136–145)
SODIUM SERPL-SCNC: 134 MMOL/L (ref 136–145)
TACROLIMUS BLD-MCNC: 2.5 NG/ML
WBC # BLD AUTO: 2.1 X10*3/UL (ref 4.4–11.3)

## 2025-03-21 PROCEDURE — 2500000002 HC RX 250 W HCPCS SELF ADMINISTERED DRUGS (ALT 637 FOR MEDICARE OP, ALT 636 FOR OP/ED): Performed by: HOSPITALIST

## 2025-03-21 PROCEDURE — 80197 ASSAY OF TACROLIMUS: CPT | Performed by: STUDENT IN AN ORGANIZED HEALTH CARE EDUCATION/TRAINING PROGRAM

## 2025-03-21 PROCEDURE — 2500000002 HC RX 250 W HCPCS SELF ADMINISTERED DRUGS (ALT 637 FOR MEDICARE OP, ALT 636 FOR OP/ED): Performed by: STUDENT IN AN ORGANIZED HEALTH CARE EDUCATION/TRAINING PROGRAM

## 2025-03-21 PROCEDURE — 2500000001 HC RX 250 WO HCPCS SELF ADMINISTERED DRUGS (ALT 637 FOR MEDICARE OP): Performed by: STUDENT IN AN ORGANIZED HEALTH CARE EDUCATION/TRAINING PROGRAM

## 2025-03-21 PROCEDURE — 85025 COMPLETE CBC W/AUTO DIFF WBC: CPT | Performed by: STUDENT IN AN ORGANIZED HEALTH CARE EDUCATION/TRAINING PROGRAM

## 2025-03-21 PROCEDURE — 84295 ASSAY OF SERUM SODIUM: CPT | Performed by: STUDENT IN AN ORGANIZED HEALTH CARE EDUCATION/TRAINING PROGRAM

## 2025-03-21 PROCEDURE — 83735 ASSAY OF MAGNESIUM: CPT | Performed by: STUDENT IN AN ORGANIZED HEALTH CARE EDUCATION/TRAINING PROGRAM

## 2025-03-21 PROCEDURE — 86706 HEP B SURFACE ANTIBODY: CPT | Performed by: INTERNAL MEDICINE

## 2025-03-21 PROCEDURE — 2500000004 HC RX 250 GENERAL PHARMACY W/ HCPCS (ALT 636 FOR OP/ED): Performed by: STUDENT IN AN ORGANIZED HEALTH CARE EDUCATION/TRAINING PROGRAM

## 2025-03-21 PROCEDURE — 99233 SBSQ HOSP IP/OBS HIGH 50: CPT | Performed by: HOSPITALIST

## 2025-03-21 PROCEDURE — 1200000002 HC GENERAL ROOM WITH TELEMETRY DAILY

## 2025-03-21 PROCEDURE — 99233 SBSQ HOSP IP/OBS HIGH 50: CPT | Performed by: INTERNAL MEDICINE

## 2025-03-21 PROCEDURE — 36415 COLL VENOUS BLD VENIPUNCTURE: CPT | Performed by: STUDENT IN AN ORGANIZED HEALTH CARE EDUCATION/TRAINING PROGRAM

## 2025-03-21 PROCEDURE — 82330 ASSAY OF CALCIUM: CPT | Performed by: STUDENT IN AN ORGANIZED HEALTH CARE EDUCATION/TRAINING PROGRAM

## 2025-03-21 PROCEDURE — 82947 ASSAY GLUCOSE BLOOD QUANT: CPT

## 2025-03-21 RX ORDER — CARVEDILOL 12.5 MG/1
37.5 TABLET ORAL 2 TIMES DAILY
Status: DISCONTINUED | OUTPATIENT
Start: 2025-03-21 | End: 2025-03-22

## 2025-03-21 RX ORDER — CARVEDILOL 12.5 MG/1
37.5 TABLET ORAL ONCE
Status: COMPLETED | OUTPATIENT
Start: 2025-03-21 | End: 2025-03-21

## 2025-03-21 RX ADMIN — TACROLIMUS 0.5 MG: 0.5 CAPSULE ORAL at 18:30

## 2025-03-21 RX ADMIN — NIFEDIPINE 60 MG: 60 TABLET, FILM COATED, EXTENDED RELEASE ORAL at 09:03

## 2025-03-21 RX ADMIN — HEPARIN SODIUM 5000 UNITS: 5000 INJECTION, SOLUTION INTRAVENOUS; SUBCUTANEOUS at 13:48

## 2025-03-21 RX ADMIN — PRAVASTATIN SODIUM 10 MG: 20 TABLET ORAL at 21:21

## 2025-03-21 RX ADMIN — FLUOCINONIDE: 0.5 OINTMENT TOPICAL at 09:02

## 2025-03-21 RX ADMIN — SODIUM ZIRCONIUM CYCLOSILICATE 10 G: 10 POWDER, FOR SUSPENSION ORAL at 13:48

## 2025-03-21 RX ADMIN — CARVEDILOL 37.5 MG: 12.5 TABLET, FILM COATED ORAL at 04:49

## 2025-03-21 RX ADMIN — SODIUM ZIRCONIUM CYCLOSILICATE 10 G: 10 POWDER, FOR SUSPENSION ORAL at 04:21

## 2025-03-21 RX ADMIN — ISOSORBIDE MONONITRATE 60 MG: 30 TABLET, EXTENDED RELEASE ORAL at 09:03

## 2025-03-21 RX ADMIN — ASCORBIC ACID, THIAMINE MONONITRATE,RIBOFLAVIN, NIACINAMIDE, PYRIDOXINE HYDROCHLORIDE, FOLIC ACID, CYANOCOBALAMIN, BIOTIN, CALCIUM PANTOTHENATE, 1 CAPSULE: 100; 1.5; 1.7; 20; 10; 1; 6000; 150000; 5 CAPSULE, LIQUID FILLED ORAL at 09:03

## 2025-03-21 RX ADMIN — TACROLIMUS 0.5 MG: 0.5 CAPSULE ORAL at 06:40

## 2025-03-21 RX ADMIN — INSULIN GLARGINE 3 UNITS: 100 INJECTION, SOLUTION SUBCUTANEOUS at 18:30

## 2025-03-21 RX ADMIN — PREDNISONE 5 MG: 5 TABLET ORAL at 09:03

## 2025-03-21 RX ADMIN — CLOTRIMAZOLE: 1 CREAM TOPICAL at 09:02

## 2025-03-21 RX ADMIN — PANTOPRAZOLE SODIUM 40 MG: 40 TABLET, DELAYED RELEASE ORAL at 06:37

## 2025-03-21 RX ADMIN — HEPARIN SODIUM 5000 UNITS: 5000 INJECTION, SOLUTION INTRAVENOUS; SUBCUTANEOUS at 06:40

## 2025-03-21 RX ADMIN — INSULIN LISPRO 3 UNITS: 100 INJECTION, SOLUTION INTRAVENOUS; SUBCUTANEOUS at 18:30

## 2025-03-21 ASSESSMENT — COGNITIVE AND FUNCTIONAL STATUS - GENERAL
DAILY ACTIVITIY SCORE: 24
STANDING UP FROM CHAIR USING ARMS: A LITTLE
MOBILITY SCORE: 18
WALKING IN HOSPITAL ROOM: A LITTLE
MOVING FROM LYING ON BACK TO SITTING ON SIDE OF FLAT BED WITH BEDRAILS: A LITTLE
CLIMB 3 TO 5 STEPS WITH RAILING: A LITTLE
TURNING FROM BACK TO SIDE WHILE IN FLAT BAD: A LITTLE
MOVING TO AND FROM BED TO CHAIR: A LITTLE

## 2025-03-21 ASSESSMENT — PAIN SCALES - GENERAL
PAINLEVEL_OUTOF10: 0 - NO PAIN
PAINLEVEL_OUTOF10: 0 - NO PAIN

## 2025-03-21 ASSESSMENT — PAIN - FUNCTIONAL ASSESSMENT: PAIN_FUNCTIONAL_ASSESSMENT: 0-10

## 2025-03-21 NOTE — CARE PLAN
Problem: Skin  Goal: Decreased wound size/increased tissue granulation at next dressing change  Outcome: Progressing  Flowsheets (Taken 3/21/2025 0201)  Decreased wound size/increased tissue granulation at next dressing change: Protective dressings over bony prominences  Goal: Participates in plan/prevention/treatment measures  Outcome: Progressing  Flowsheets (Taken 3/21/2025 0201)  Participates in plan/prevention/treatment measures: Elevate heels  Goal: Prevent/manage excess moisture  Outcome: Progressing  Flowsheets (Taken 3/21/2025 0201)  Prevent/manage excess moisture: Moisturize dry skin  Goal: Prevent/minimize sheer/friction injuries  Outcome: Progressing  Flowsheets (Taken 3/21/2025 0201)  Prevent/minimize sheer/friction injuries:   HOB 30 degrees or less   Turn/reposition every 2 hours/use positioning/transfer devices  Goal: Promote/optimize nutrition  Outcome: Progressing  Flowsheets (Taken 3/21/2025 0201)  Promote/optimize nutrition: Monitor/record intake including meals  Goal: Promote skin healing  Flowsheets (Taken 3/21/2025 0201)  Promote skin healing: Turn/reposition every 2 hours/use positioning/transfer devices     Problem: Safety - Adult  Goal: Free from fall injury  Outcome: Progressing     Problem: Chronic Conditions and Co-morbidities  Goal: Patient's chronic conditions and co-morbidity symptoms are monitored and maintained or improved  Outcome: Progressing     Problem: Fall/Injury  Goal: Not fall by end of shift  Outcome: Progressing  Goal: Be free from injury by end of the shift  Outcome: Progressing   The patient's goals for the shift include      The clinical goals for the shift include Patient will remain safe, free from injury or fall

## 2025-03-21 NOTE — PROGRESS NOTES
Transplant Nephrology progress note     Date of admission: 3/18/2025     Manolo Bashir is a 69 y.o.  with PMH   Past Medical History:   Diagnosis Date    Anemia     Arthritis     Cataract     Chronic kidney disease, stage 3 unspecified (Multi) 09/26/2018    Stage 3 chronic kidney disease    CKD (chronic kidney disease)     stage V    Cough 02/12/2024    COVID-19 06/18/2020    COVID-19 virus infection    Diabetes (Multi)     ESRD (end stage renal disease) (Multi)     Focal and segmental proliferative glomerulonephritis 12/23/2023    HTN (hypertension)     Hyperlipidemia     Other long term (current) drug therapy 07/20/2021    High risk medication use    Personal history of other diseases of the circulatory system     Personal history of cardiac murmur    Personal history of other infectious and parasitic diseases 08/17/2015    History of hepatitis    Polyp, colonic 08/17/2023    Primary osteoarthritis of both ankles 08/17/2023    Prostate cancer (Multi)     Tubular adenoma of colon 08/17/2023    Unspecified kidney failure 08/17/2016    Renal failure        SUBJECTIVE:  No more loose stools .      PROBLEM LIST:  Assessment & Plan  Hyperkalemia           ALLERGIES:  No Known Allergies         CURRENT MEDICATIONS:  Scheduled medications  carvedilol, 37.5 mg, oral, BID  [Held by provider] cinacalcet, 30 mg, oral, Daily  clotrimazole, , Topical, BID  dextrose, 25 g, intravenous, Once  fluocinonide, , Topical, BID  heparin (porcine), 5,000 Units, subcutaneous, q8h ZOË  imiquimod, 1 packet, Topical, Once per day on Monday Wednesday Friday  insulin glargine, 3 Units, subcutaneous, q24h  insulin lispro, 0-5 Units, subcutaneous, TID AC  isosorbide mononitrate ER, 60 mg, oral, Daily  NIFEdipine ER, 60 mg, oral, BID  pantoprazole, 40 mg, oral, Daily before breakfast  pravastatin, 10 mg, oral, Nightly  predniSONE, 5 mg, oral, Daily  [Held by provider] sevelamer carbonate, 800 mg, oral, TID AC  sodium zirconium cyclosilicate, 10  "g, oral, Daily  tacrolimus, 0.5 mg, oral, BID  vitamin B complex-vitamin C-folic acid, 1 capsule, oral, Daily      Continuous medications       PRN medications  PRN medications: acetaminophen, dextrose, dextrose, glucagon, glucagon, trimethobenzamide       OBJECTIVE:    VITALS: Visit Vitals  /74 (BP Location: Right arm, Patient Position: Lying)   Pulse 58   Temp 36.6 °C (97.9 °F) (Temporal)   Resp 19   Ht 1.803 m (5' 11\")   Wt 69 kg (152 lb 1.9 oz)   SpO2 98%   BMI 21.22 kg/m²   Smoking Status Never   BSA 1.86 m²        General: No distress   CVS: S1 S2 no murmurs  RESP:  Lungs clear to auscultation   ABDO: Soft, non-tender   Neuro: A + O x 3  Skin: No rash   Extremities: No edema  Access: LUE AVF       LABS:  Results from last 72 hours   Lab Units 03/21/25  0544   WBC AUTO x10*3/uL 2.1*   HEMOGLOBIN g/dL 9.8*   MCV fL 88   PLATELETS AUTO x10*3/uL 93*   BUN mg/dL 23   CREATININE mg/dL 8.57*   CALCIUM mg/dL 8.7   TACROLIMUS ng/mL 2.5            Intake/Output Summary (Last 24 hours) at 3/21/2025 1510  Last data filed at 3/20/2025 2100  Gross per 24 hour   Intake 150 ml   Output --   Net 150 ml          ASSESSMENT AND PLAN:  Manolo Bashir is a 69 y.o. male presenting with LLQ allograft tenderness and gross hematuria.  He has ESKD attributed to hypertension since 1998 s/p kidney transplant #1 3/26/1992 that failed 11/13/2006), s/p kidney transplant #2 7/13/2013 which failed in May 2024 following which he has been on HD (TTS, [facility] via LUE AVG) . He also has known MGUS with IgG and IgA lambda (bone marrow bx 10/2023 with no plasma cell dyscrasia), DM2, HTN, prostate cancer s/p radical prostatectomy, baseline urinary incontinence, HCV s/p treatment (recent HCV PCR negative 7/2024).  Currently presented with a missed dialysis in the setting of diarrhea      #ESRD TTS   -HD at [facility], nephrologist Dr. Bridges, DW 69kg  -Reviewed labs today planning dialysis tomorrow.  Seems to have significant stenosis of " the fistula need PTA of the fistula for further management.    #Diarrhea  -CMV, C diff negative  -Rotavirus positive      # Immunosuppression-Tac 0.5mg BID, Pred 5mg daily  #Graft intolerance/chronic rejection - transplant nephrectomy outpatient planned    #Hypertension -> blood pressures are optimal, continue with the nifedipine, Imdur, carvedilol.   #CKD-MBD - Calcitriol 0.5 mg/day, Sensipar 30 mg/day      Kaycee Arroyo MD

## 2025-03-21 NOTE — PROGRESS NOTES
Physical Therapy                 Therapy Communication Note    Patient Name: Manolo Bashir  MRN: [id number]  Department: [place] [id number]  Room: [address] [place] [address]  Today's Date: 3/21/2025     Discipline: Physical Therapy    PT Missed Visit: Yes     Missed Visit Reason: Missed Visit Reason:  (@1324 pt currently off the floor at dialysis)    Missed Time: Attempt      03/21/25 at 1:24 PM - Alexandra Nieves, PT

## 2025-03-21 NOTE — PROGRESS NOTES
Kelvin Bashir is a 69 y.o. male on day 2 of admission presenting with Hyperkalemia.      Subjective   Mr. Bashir was seen and evaluated. He states that he is doing well. He denied fever and chills. And is requesting for food.  He is aware of plans to get IR involved to see if his fistula can be stented as this seems to be affecting his dialysis.    Objective     Last Recorded Vitals  /74   Pulse 58   Temp 36.6 °C (97.9 °F)   Resp 19   Wt 69 kg (152 lb 1.9 oz)   SpO2 98%   Intake/Output last 3 Shifts:    Intake/Output Summary (Last 24 hours) at 3/21/2025 1307  Last data filed at 3/20/2025 2100  Gross per 24 hour   Intake 150 ml   Output --   Net 150 ml       Admission Weight  Weight: 65.8 kg (145 lb) (03/19/25 0800)    Daily Weight  03/21/25 : 69 kg (152 lb 1.9 oz)    Image Results  Vascular US upper extremity hemodialysis access duplex left              [place] [address]    Tel [phone] and Fax [phone]       Vascular Lab Report  VASC US UPPER EXTREMITY HEMODIALYSIS ACCESS DUPLEX LEFT       Patient Name:     KELVIN BASHIR      Reading Physician: [id number] Jeni Perez MD  Study Date:       3/19/2025           Ordering           [id number] HUANG GUTIERREZ                                        Physician:  MRN/PID:          [id number]            Technologist:      Dre Lane RVT  Accession#:       [id number]        Technologist 2:  Date of           1955 / 69      Encounter#:        [id number]  Birth/Age:        years  Gender:           M  Admission Status: Inpatient           Location           [facility]                                        Performed:       Diagnosis/ICD: Other complications of surgical or medical care, not elsewhere                 classified-T88.8XXS  Indication:    Complication of previous AVF  CPT Codes:     28286 Duplex Hemodialysis Access       **CRITICAL  RESULT**  Critical Result: Stenosis at the arterial anastomosis.  Notification called to Anay Arguelles MD on 3/19/2025 at 4:58:21 PM. Acknowledged critical results notification communicated via Epic secure chat by Dre Lane RVT.     CONCLUSIONS:  Dialysis Access Evaluation: Left brachial-axillary AVG shows an increased velocity at the arterial anastomosis which may be indicative of a stenosis.     Imaging & Doppler Findings:     Dialysis Access Graft                  Left Velocity  Arterial Anast  621 cm/s  Flow Prox       411 cm/s  Flow Prox/Mid   220 cm/s  Mid             159 cm/s  Mid/Flow Distal 244 cm/s  Flow Distal     241 cm/s  Outflow Anast   215 cm/s  Outflow Vein    70 cm/s  Pre Anast       214 cm/s  Post Anast      85 cm/s       [id number] Jeni Perez MD  Electronically signed by [id number] Jeni Perez MD on 3/20/2025 at 9:25:12 AM       ** Final **  ECG 12 Lead  See ED provider note for full interpretation and clinical correlation  Confirmed by Zac Matamoros ([id number]) on 3/20/2025 5:16:39 AM  ECG 12 Lead  See ED provider note for full interpretation and clinical correlation  Confirmed by Zac Matamoros ([id number]) on 3/20/2025 5:16:14 AM      Physical Exam  Constitutional:       Appearance: Normal appearance.   HENT:      Head: Normocephalic and atraumatic.      Nose: Nose normal.      Mouth/Throat:      Mouth: Mucous membranes are dry.   Eyes:      Extraocular Movements: Extraocular movements intact.      Pupils: Pupils are equal, round, and reactive to light.   Cardiovascular:      Rate and Rhythm: Normal rate and regular rhythm.      Pulses: Normal pulses.      Heart sounds: Normal heart sounds.   Pulmonary:      Effort: Pulmonary effort is normal.      Breath sounds: Normal breath sounds.   Abdominal:      General: Bowel sounds are normal.      Palpations: Abdomen is soft.   Neurological:      Mental Status: He is alert.         Relevant Results               Assessment & Plan  Hyperkalemia  Much  improved.  Transferred out of icu yesterday.  K is normal today  Continue to monitor    Hypovolemic shock:  resolved.    Diarrhea due to Rotavirus: diarrhea has resolved.  Continue isolation and symptomatic treatment    Hypotension due to Hypovolemic shock:  resolved.      ESRD on HD hx of renal transplant and immunosuppression:  possible dialysis in am  Suspected AV fistula stenosis.  Consult placed to IR for fistula evaluation    Hx of Hypertension:   Will continue meds    Acute Diarrheal Illness:    Management is symptomatic      Elevated Troponin: this is most likely due to demand ischemia in the setting of ESRD, there is no chest pain. Will continue to monitor          Malnutrition Diagnosis Status: New  Malnutrition Diagnosis: Severe malnutrition related to chronic disease or condition     As Evidenced by: pt reports a poor appetite and intake PTA; he is down in weight by 15% in close to one year with noted areas of severe fat and muscle wasting on physical exam  I agree with the dietitian's malnutrition diagnosis.         Jaquan Zhao MD

## 2025-03-21 NOTE — NURSING NOTE
Report from Sending RN:    Report From: Eliza  Recent Surgery of Procedure: No  Baseline Level of Consciousness (LOC): a/o x 4  Oxygen Use: No  Type: na  Diabetic: Yes  Last BP Med Given Day of Dialysis: see MAR  Last Pain Med Given: na  Lab Tests to be Obtained with Dialysis: No  Blood Transfusion to be Given During Dialysis: No  Available IV Access: Yes, #20 RFA x 2  Medications to be Administered During Dialysis: No  Continuous IV Infusion Running: No  Restraints on Currently or in the Last 24 Hours: No  Hand-Off Communication: No issues or concerns overnight.  Stable for transport and can stand for weight.  Travel by reg cart.  Dialysis Catheter Dressing: L AVF  Last Dressing Change: AVF

## 2025-03-21 NOTE — ASSESSMENT & PLAN NOTE
Much improved.  Transferred out of icu yesterday.  K is normal today  Continue to monitor    Hypovolemic shock:  resolved.    Diarrhea due to Rotavirus: diarrhea has resolved.  Continue isolation and symptomatic treatment    Hypotension due to Hypovolemic shock:  resolved.      ESRD on HD hx of renal transplant and immunosuppression:  possible dialysis in am  Suspected AV fistula stenosis.  Consult placed to IR for fistula evaluation    Hx of Hypertension:   Will continue meds    Acute Diarrheal Illness:    Management is symptomatic      Elevated Troponin: this is most likely due to demand ischemia in the setting of ESRD, there is no chest pain. Will continue to monitor

## 2025-03-22 ENCOUNTER — APPOINTMENT (OUTPATIENT)
Dept: DIALYSIS | Facility: HOSPITAL | Age: 70
End: 2025-03-22
Payer: MEDICARE

## 2025-03-22 LAB
ALBUMIN SERPL BCP-MCNC: 2.9 G/DL (ref 3.4–5)
ANION GAP SERPL CALC-SCNC: 14 MMOL/L (ref 10–20)
BASOPHILS # BLD MANUAL: 0.02 X10*3/UL (ref 0–0.1)
BASOPHILS NFR BLD MANUAL: 0.9 %
BUN SERPL-MCNC: 34 MG/DL (ref 6–23)
CALCIUM SERPL-MCNC: 8.4 MG/DL (ref 8.6–10.6)
CALPROTECTIN STL-MCNT: 11 UG/G
CARDIAC TROPONIN I PNL SERPL HS: 164 NG/L (ref 0–53)
CHLORIDE SERPL-SCNC: 98 MMOL/L (ref 98–107)
CO2 SERPL-SCNC: 25 MMOL/L (ref 21–32)
CREAT SERPL-MCNC: 11.62 MG/DL (ref 0.5–1.3)
EGFRCR SERPLBLD CKD-EPI 2021: 4 ML/MIN/1.73M*2
ELASTASE PANC STL-MCNT: 26 UG/G
EOSINOPHIL # BLD MANUAL: 0.05 X10*3/UL (ref 0–0.7)
EOSINOPHIL NFR BLD MANUAL: 1.8 %
ERYTHROCYTE [DISTWIDTH] IN BLOOD BY AUTOMATED COUNT: 15.9 % (ref 11.5–14.5)
GLUCOSE BLD MANUAL STRIP-MCNC: 166 MG/DL (ref 74–99)
GLUCOSE BLD MANUAL STRIP-MCNC: 170 MG/DL (ref 74–99)
GLUCOSE BLD MANUAL STRIP-MCNC: 237 MG/DL (ref 74–99)
GLUCOSE BLD MANUAL STRIP-MCNC: 301 MG/DL (ref 74–99)
GLUCOSE SERPL-MCNC: 147 MG/DL (ref 74–99)
HCT VFR BLD AUTO: 30.4 % (ref 41–52)
HGB BLD-MCNC: 10 G/DL (ref 13.5–17.5)
IMM GRANULOCYTES # BLD AUTO: 0.02 X10*3/UL (ref 0–0.7)
IMM GRANULOCYTES NFR BLD AUTO: 0.8 % (ref 0–0.9)
LYMPHOCYTES # BLD MANUAL: 1.04 X10*3/UL (ref 1.2–4.8)
LYMPHOCYTES NFR BLD MANUAL: 40 %
MAGNESIUM SERPL-MCNC: 2 MG/DL (ref 1.6–2.4)
MCH RBC QN AUTO: 28.2 PG (ref 26–34)
MCHC RBC AUTO-ENTMCNC: 32.9 G/DL (ref 32–36)
MCV RBC AUTO: 86 FL (ref 80–100)
MONOCYTES # BLD MANUAL: 0.21 X10*3/UL (ref 0.1–1)
MONOCYTES NFR BLD MANUAL: 8.2 %
NEUTS SEG # BLD MANUAL: 1.28 X10*3/UL (ref 1.2–7)
NEUTS SEG NFR BLD MANUAL: 49.1 %
NRBC BLD-RTO: 0 /100 WBCS (ref 0–0)
OVALOCYTES BLD QL SMEAR: ABNORMAL
PHOSPHATE SERPL-MCNC: 4.9 MG/DL (ref 2.5–4.9)
PLATELET # BLD AUTO: 80 X10*3/UL (ref 150–450)
POTASSIUM SERPL-SCNC: 3.9 MMOL/L (ref 3.5–5.3)
RBC # BLD AUTO: 3.55 X10*6/UL (ref 4.5–5.9)
RBC MORPH BLD: ABNORMAL
SODIUM SERPL-SCNC: 133 MMOL/L (ref 136–145)
TACROLIMUS BLD-MCNC: 3.4 NG/ML
TOTAL CELLS COUNTED BLD: 110
WBC # BLD AUTO: 2.6 X10*3/UL (ref 4.4–11.3)

## 2025-03-22 PROCEDURE — 2500000002 HC RX 250 W HCPCS SELF ADMINISTERED DRUGS (ALT 637 FOR MEDICARE OP, ALT 636 FOR OP/ED): Performed by: HOSPITALIST

## 2025-03-22 PROCEDURE — 80069 RENAL FUNCTION PANEL: CPT | Performed by: STUDENT IN AN ORGANIZED HEALTH CARE EDUCATION/TRAINING PROGRAM

## 2025-03-22 PROCEDURE — 2500000004 HC RX 250 GENERAL PHARMACY W/ HCPCS (ALT 636 FOR OP/ED): Performed by: STUDENT IN AN ORGANIZED HEALTH CARE EDUCATION/TRAINING PROGRAM

## 2025-03-22 PROCEDURE — 82947 ASSAY GLUCOSE BLOOD QUANT: CPT

## 2025-03-22 PROCEDURE — 99233 SBSQ HOSP IP/OBS HIGH 50: CPT | Performed by: HOSPITALIST

## 2025-03-22 PROCEDURE — 2500000001 HC RX 250 WO HCPCS SELF ADMINISTERED DRUGS (ALT 637 FOR MEDICARE OP): Performed by: STUDENT IN AN ORGANIZED HEALTH CARE EDUCATION/TRAINING PROGRAM

## 2025-03-22 PROCEDURE — 85027 COMPLETE CBC AUTOMATED: CPT | Performed by: STUDENT IN AN ORGANIZED HEALTH CARE EDUCATION/TRAINING PROGRAM

## 2025-03-22 PROCEDURE — 84484 ASSAY OF TROPONIN QUANT: CPT | Performed by: INTERNAL MEDICINE

## 2025-03-22 PROCEDURE — 2500000002 HC RX 250 W HCPCS SELF ADMINISTERED DRUGS (ALT 637 FOR MEDICARE OP, ALT 636 FOR OP/ED): Performed by: STUDENT IN AN ORGANIZED HEALTH CARE EDUCATION/TRAINING PROGRAM

## 2025-03-22 PROCEDURE — 99233 SBSQ HOSP IP/OBS HIGH 50: CPT | Performed by: INTERNAL MEDICINE

## 2025-03-22 PROCEDURE — 85007 BL SMEAR W/DIFF WBC COUNT: CPT | Performed by: STUDENT IN AN ORGANIZED HEALTH CARE EDUCATION/TRAINING PROGRAM

## 2025-03-22 PROCEDURE — 36415 COLL VENOUS BLD VENIPUNCTURE: CPT | Performed by: STUDENT IN AN ORGANIZED HEALTH CARE EDUCATION/TRAINING PROGRAM

## 2025-03-22 PROCEDURE — 1100000001 HC PRIVATE ROOM DAILY

## 2025-03-22 PROCEDURE — 83735 ASSAY OF MAGNESIUM: CPT | Performed by: STUDENT IN AN ORGANIZED HEALTH CARE EDUCATION/TRAINING PROGRAM

## 2025-03-22 PROCEDURE — 80197 ASSAY OF TACROLIMUS: CPT | Performed by: STUDENT IN AN ORGANIZED HEALTH CARE EDUCATION/TRAINING PROGRAM

## 2025-03-22 RX ORDER — INSULIN LISPRO 100 [IU]/ML
4 INJECTION, SOLUTION INTRAVENOUS; SUBCUTANEOUS ONCE
Status: COMPLETED | OUTPATIENT
Start: 2025-03-22 | End: 2025-03-22

## 2025-03-22 RX ORDER — CARVEDILOL 12.5 MG/1
37.5 TABLET ORAL 2 TIMES DAILY
Status: DISCONTINUED | OUTPATIENT
Start: 2025-03-22 | End: 2025-03-26 | Stop reason: HOSPADM

## 2025-03-22 RX ADMIN — FLUOCINONIDE: 0.5 OINTMENT TOPICAL at 09:38

## 2025-03-22 RX ADMIN — TACROLIMUS 0.5 MG: 0.5 CAPSULE ORAL at 17:41

## 2025-03-22 RX ADMIN — HEPARIN SODIUM 5000 UNITS: 5000 INJECTION, SOLUTION INTRAVENOUS; SUBCUTANEOUS at 22:50

## 2025-03-22 RX ADMIN — INSULIN GLARGINE 3 UNITS: 100 INJECTION, SOLUTION SUBCUTANEOUS at 17:41

## 2025-03-22 RX ADMIN — HEPARIN SODIUM 5000 UNITS: 5000 INJECTION, SOLUTION INTRAVENOUS; SUBCUTANEOUS at 06:46

## 2025-03-22 RX ADMIN — HEPARIN SODIUM 5000 UNITS: 5000 INJECTION, SOLUTION INTRAVENOUS; SUBCUTANEOUS at 01:09

## 2025-03-22 RX ADMIN — NIFEDIPINE 60 MG: 60 TABLET, FILM COATED, EXTENDED RELEASE ORAL at 01:10

## 2025-03-22 RX ADMIN — CLOTRIMAZOLE: 1 CREAM TOPICAL at 09:38

## 2025-03-22 RX ADMIN — PANTOPRAZOLE SODIUM 40 MG: 40 TABLET, DELAYED RELEASE ORAL at 06:45

## 2025-03-22 RX ADMIN — CARVEDILOL 37.5 MG: 12.5 TABLET, FILM COATED ORAL at 22:46

## 2025-03-22 RX ADMIN — NIFEDIPINE 60 MG: 60 TABLET, FILM COATED, EXTENDED RELEASE ORAL at 22:46

## 2025-03-22 RX ADMIN — CLOTRIMAZOLE: 1 CREAM TOPICAL at 22:49

## 2025-03-22 RX ADMIN — PREDNISONE 5 MG: 5 TABLET ORAL at 09:38

## 2025-03-22 RX ADMIN — SODIUM ZIRCONIUM CYCLOSILICATE 10 G: 10 POWDER, FOR SUSPENSION ORAL at 09:38

## 2025-03-22 RX ADMIN — FLUOCINONIDE: 0.5 OINTMENT TOPICAL at 22:49

## 2025-03-22 RX ADMIN — INSULIN LISPRO 1 UNITS: 100 INJECTION, SOLUTION INTRAVENOUS; SUBCUTANEOUS at 17:41

## 2025-03-22 RX ADMIN — INSULIN LISPRO 4 UNITS: 100 INJECTION, SOLUTION INTRAVENOUS; SUBCUTANEOUS at 03:59

## 2025-03-22 RX ADMIN — ASCORBIC ACID, THIAMINE MONONITRATE,RIBOFLAVIN, NIACINAMIDE, PYRIDOXINE HYDROCHLORIDE, FOLIC ACID, CYANOCOBALAMIN, BIOTIN, CALCIUM PANTOTHENATE, 1 CAPSULE: 100; 1.5; 1.7; 20; 10; 1; 6000; 150000; 5 CAPSULE, LIQUID FILLED ORAL at 09:38

## 2025-03-22 RX ADMIN — TACROLIMUS 0.5 MG: 0.5 CAPSULE ORAL at 06:45

## 2025-03-22 RX ADMIN — PRAVASTATIN SODIUM 10 MG: 20 TABLET ORAL at 22:45

## 2025-03-22 ASSESSMENT — COGNITIVE AND FUNCTIONAL STATUS - GENERAL
HELP NEEDED FOR BATHING: A LITTLE
DRESSING REGULAR UPPER BODY CLOTHING: A LITTLE
DRESSING REGULAR LOWER BODY CLOTHING: A LITTLE
WALKING IN HOSPITAL ROOM: A LITTLE
PERSONAL GROOMING: A LITTLE
DAILY ACTIVITIY SCORE: 19
TURNING FROM BACK TO SIDE WHILE IN FLAT BAD: A LITTLE
MOBILITY SCORE: 20
TOILETING: A LITTLE
CLIMB 3 TO 5 STEPS WITH RAILING: A LITTLE
STANDING UP FROM CHAIR USING ARMS: A LITTLE

## 2025-03-22 ASSESSMENT — PAIN SCALES - GENERAL
PAINLEVEL_OUTOF10: 0 - NO PAIN
PAINLEVEL_OUTOF10: 0 - NO PAIN

## 2025-03-22 NOTE — ASSESSMENT & PLAN NOTE
Treated with dialysis.  Much improved    Hypovolemic shock:  resolved.    Diarrhea due to Rotavirus: denied any recent diarrhea in the last 24 hours.    Hypotension due to Hypovolemic shock:  resolved.      ESRD on HD hx of renal transplant and immunosuppression:  on dialysis today and tolerating well.  Defer to nephro for additional recommendations.     Hx of Hypertension:   Patient's BP was on the soft side earlier this morning and BP meds were held.     Acute Diarrheal Illness:    resolved      Elevated Troponin: trop is trending down. Patient is not complaining of chest pain. Most likely due to demand ischemia in the setting of hypovolemia in ESRD patient.     AV Fistula stenosis: IR consulted and awaiting for additional recommendation from them. Possible stenosis replacement next week.

## 2025-03-22 NOTE — NURSING NOTE
Report to Receiving RN:    Report To: AYO Camarena via secure chat  Time Report Called: 1610  Hand-Off Communication: pt stable , completed and tolerated HD tx with no issues, post vitals: bp 151/76, pulse 58, pt removed 1 liter  Complications During Treatment: No  Ultrafiltration Treatment: Yes  Medications Administered During Dialysis: No  Blood Products Administered During Dialysis: No  Labs Sent During Dialysis: No  Heparin Drip Rate Changes: No  Dialysis Catheter Dressing: n/a  Last Dressing Change: n/a avf      Last Updated: 4:06 PM by DEIRDRE NICOLE

## 2025-03-22 NOTE — NURSING NOTE
Report from Sending RN:    Report From: Nicol ( AYO)  Recent Surgery of Procedure: No  Baseline Level of Consciousness (LOC): a/o x 4  Oxygen Use: No  Type: none  Diabetic: Yes, 170  Last BP Med Given Day of Dialysis: none  Last Pain Med Given: none  Lab Tests to be Obtained with Dialysis: No  Blood Transfusion to be Given During Dialysis: No  Available IV Access: Yes  Medications to be Administered During Dialysis: No  Continuous IV Infusion Running: No  Restraints on Currently or in the Last 24 Hours: No  Hand-Off Communication: no acute morning events; vss; no labs needed; pt is able to stand and travels by cart; pt may go off unit without telemetry; pt is a full code. Priscilla Bolaños RN.  Dialysis Catheter Dressing: left upper arm AVF  Last Dressing Change: will assess when pt arrives to the unit

## 2025-03-22 NOTE — PROGRESS NOTES
Transplant Nephrology progress note     Date of admission: 3/18/2025     Manolo Bashir is a 69 y.o.  with PMH   Past Medical History:   Diagnosis Date    Anemia     Arthritis     Cataract     Chronic kidney disease, stage 3 unspecified (Multi) 09/26/2018    Stage 3 chronic kidney disease    CKD (chronic kidney disease)     stage V    Cough 02/12/2024    COVID-19 06/18/2020    COVID-19 virus infection    Diabetes (Multi)     ESRD (end stage renal disease) (Multi)     Focal and segmental proliferative glomerulonephritis 12/23/2023    HTN (hypertension)     Hyperlipidemia     Other long term (current) drug therapy 07/20/2021    High risk medication use    Personal history of other diseases of the circulatory system     Personal history of cardiac murmur    Personal history of other infectious and parasitic diseases 08/17/2015    History of hepatitis    Polyp, colonic 08/17/2023    Primary osteoarthritis of both ankles 08/17/2023    Prostate cancer (Multi)     Tubular adenoma of colon 08/17/2023    Unspecified kidney failure 08/17/2016    Renal failure        SUBJECTIVE:  Denied any complaints this morning.      PROBLEM LIST:  Assessment & Plan  Hyperkalemia           ALLERGIES:  No Known Allergies         CURRENT MEDICATIONS:  Scheduled medications  carvedilol, 37.5 mg, oral, BID  [Held by provider] cinacalcet, 30 mg, oral, Daily  clotrimazole, , Topical, BID  dextrose, 25 g, intravenous, Once  fluocinonide, , Topical, BID  heparin (porcine), 5,000 Units, subcutaneous, q8h ZOË  imiquimod, 1 packet, Topical, Once per day on Monday Wednesday Friday  insulin glargine, 3 Units, subcutaneous, q24h  insulin lispro, 0-5 Units, subcutaneous, TID AC  isosorbide mononitrate ER, 60 mg, oral, Daily  NIFEdipine ER, 60 mg, oral, BID  pantoprazole, 40 mg, oral, Daily before breakfast  pravastatin, 10 mg, oral, Nightly  predniSONE, 5 mg, oral, Daily  [Held by provider] sevelamer carbonate, 800 mg, oral, TID AC  sodium zirconium  "cyclosilicate, 10 g, oral, Daily  tacrolimus, 0.5 mg, oral, BID  vitamin B complex-vitamin C-folic acid, 1 capsule, oral, Daily      Continuous medications       PRN medications  PRN medications: acetaminophen, dextrose, dextrose, glucagon, glucagon, trimethobenzamide       OBJECTIVE:    VITALS: Visit Vitals  /70   Pulse 72   Temp 36.5 °C (97.7 °F)   Resp 18   Ht 1.803 m (5' 11\")   Wt 69 kg (152 lb 1.9 oz)   SpO2 98%   BMI 21.22 kg/m²   Smoking Status Never   BSA 1.86 m²        General: No distress   CVS: S1 S2 no murmurs  RESP:  Lungs clear to auscultation   ABDO: Soft, non-tender   Neuro: A + O x 3  Skin: No rash   Extremities: No edema  Access: LUE AVF       LABS:  Results from last 72 hours   Lab Units 03/22/25  0556 03/21/25  0544   WBC AUTO x10*3/uL 2.6* 2.1*   HEMOGLOBIN g/dL 10.0* 9.8*   MCV fL 86 88   PLATELETS AUTO x10*3/uL 80* 93*   BUN mg/dL 34* 23   CREATININE mg/dL 11.62* 8.57*   CALCIUM mg/dL 8.4* 8.7   TACROLIMUS ng/mL  --  2.5            Intake/Output Summary (Last 24 hours) at 3/22/2025 1205  Last data filed at 3/22/2025 0500  Gross per 24 hour   Intake 450 ml   Output --   Net 450 ml          ASSESSMENT AND PLAN:  Manolo Bashir is a 69 y.o. male presenting with LLQ allograft tenderness and gross hematuria.  He has ESKD attributed to hypertension since 1998 s/p kidney transplant #1 3/26/1992 that failed 11/13/2006), s/p kidney transplant #2 7/13/2013 which failed in May 2024 following which he has been on HD (TTS, [facility] via LUE AVG) . He also has known MGUS with IgG and IgA lambda (bone marrow bx 10/2023 with no plasma cell dyscrasia), DM2, HTN, prostate cancer s/p radical prostatectomy, baseline urinary incontinence, HCV s/p treatment (recent HCV PCR negative 7/2024).  Currently presented with a missed dialysis in the setting of diarrhea      #ESRD TTS   -HD at [facility], nephrologist Dr. Bridges, DW 69kg  -Planning dialysis today.  Can stop Lokelma  Seems to have significant " stenosis of the fistula need PTA of the fistula for further management.    #Diarrhea  -CMV, C diff negative  -Rotavirus positive      # Immunosuppression-Tac 0.5mg BID, Pred 5mg daily  #Graft intolerance/chronic rejection - transplant nephrectomy outpatient planned    #Hypertension -> blood pressures are optimal, continue with the nifedipine, Imdur, carvedilol.   #CKD-MBD - Calcitriol 0.5 mg/day,   stop Sensipar 30 mg/day      Kaycee Arroyo MD

## 2025-03-22 NOTE — PROGRESS NOTES
Kelvin Bashir is a 69 y.o. male on day 3 of admission presenting with Hyperkalemia.      Subjective     Mr. Bashir was seen and evaluated. He denied fever and chills. No chest pain. He is aware of plans to have IR evaluation of his AV fistula.    Objective     Last Recorded Vitals  /70   Pulse 69   Temp 36.5 °C (97.7 °F) (Temporal)   Resp 18   Wt 69 kg (152 lb 1.9 oz)   SpO2 98%   Intake/Output last 3 Shifts:    Intake/Output Summary (Last 24 hours) at 3/22/2025 1536  Last data filed at 3/22/2025 1300  Gross per 24 hour   Intake 850 ml   Output --   Net 850 ml       Admission Weight  Weight: 65.8 kg (145 lb) (03/19/25 0800)    Daily Weight  03/21/25 : 69 kg (152 lb 1.9 oz)    Image Results  Vascular US upper extremity hemodialysis access duplex left              [place] [address]    Tel [phone] and Fax [phone]       Vascular Lab Report  VASC US UPPER EXTREMITY HEMODIALYSIS ACCESS DUPLEX LEFT       Patient Name:     KELVIN BASHIR      Reading Physician: [id number] Jeni Perez MD  Study Date:       3/19/2025           Ordering           [id number] HUANG GUTIERREZ                                        Physician:  MRN/PID:          [id number]            Technologist:      Dre Lane RVT  Accession#:       [id number]        Technologist 2:  Date of           1955 / 69      Encounter#:        [id number]  Birth/Age:        years  Gender:           M  Admission Status: Inpatient           Location           [facility]                                        Performed:       Diagnosis/ICD: Other complications of surgical or medical care, not elsewhere                 classified-T88.8XXS  Indication:    Complication of previous AVF  CPT Codes:     64071 Duplex Hemodialysis Access       **CRITICAL RESULT**  Critical Result: Stenosis at the arterial anastomosis.  Notification called to  Anay Arguelles MD on 3/19/2025 at 4:58:21 PM. Acknowledged critical results notification communicated via Epic secure chat by Dre Lane RVT.     CONCLUSIONS:  Dialysis Access Evaluation: Left brachial-axillary AVG shows an increased velocity at the arterial anastomosis which may be indicative of a stenosis.     Imaging & Doppler Findings:     Dialysis Access Graft                  Left Velocity  Arterial Anast  621 cm/s  Flow Prox       411 cm/s  Flow Prox/Mid   220 cm/s  Mid             159 cm/s  Mid/Flow Distal 244 cm/s  Flow Distal     241 cm/s  Outflow Anast   215 cm/s  Outflow Vein    70 cm/s  Pre Anast       214 cm/s  Post Anast      85 cm/s       [id number] Jeni Perez MD  Electronically signed by [id number] Jeni Perez MD on 3/20/2025 at 9:25:12 AM       ** Final **  ECG 12 Lead  See ED provider note for full interpretation and clinical correlation  Confirmed by Zca Matamoros ([id number]) on 3/20/2025 5:16:39 AM  ECG 12 Lead  See ED provider note for full interpretation and clinical correlation  Confirmed by Zac Matamoros ([id number]) on 3/20/2025 5:16:14 AM      Physical Exam  Constitutional:       Appearance: Normal appearance.   HENT:      Head: Normocephalic and atraumatic.      Nose: Nose normal.      Mouth/Throat:      Mouth: Mucous membranes are moist.   Eyes:      Extraocular Movements: Extraocular movements intact.      Pupils: Pupils are equal, round, and reactive to light.   Cardiovascular:      Rate and Rhythm: Normal rate and regular rhythm.      Pulses: Normal pulses.      Heart sounds: Normal heart sounds.   Pulmonary:      Effort: Pulmonary effort is normal.      Breath sounds: Normal breath sounds.   Abdominal:      General: Bowel sounds are normal.      Palpations: Abdomen is soft.   Musculoskeletal:         General: Normal range of motion.      Cervical back: Neck supple.   Skin:     General: Skin is warm.   Neurological:      Mental Status: He is alert and oriented to person, place, and time.    Psychiatric:         Mood and Affect: Mood normal.         Behavior: Behavior normal.         Relevant Results                   Assessment & Plan  Hyperkalemia  Treated with dialysis.  Much improved    Hypovolemic shock:  resolved.    Diarrhea due to Rotavirus: denied any recent diarrhea in the last 24 hours.    Hypotension due to Hypovolemic shock:  resolved.      ESRD on HD hx of renal transplant and immunosuppression:  on dialysis today and tolerating well.  Defer to nephro for additional recommendations.     Hx of Hypertension:   Patient's BP was on the soft side earlier this morning and BP meds were held.     Acute Diarrheal Illness:    resolved      Elevated Troponin: trop is trending down. Patient is not complaining of chest pain. Most likely due to demand ischemia in the setting of hypovolemia in ESRD patient.     AV Fistula stenosis: IR consulted and awaiting for additional recommendation from them. Possible stenosis replacement next week.                 Jaquan Zhao MD

## 2025-03-22 NOTE — CARE PLAN
Problem: Skin  Goal: Participates in plan/prevention/treatment measures  Outcome: Progressing  Flowsheets (Taken 3/22/2025 1302)  Participates in plan/prevention/treatment measures:   Increase activity/out of bed for meals   Elevate heels     Problem: Pain - Adult  Goal: Verbalizes/displays adequate comfort level or baseline comfort level  Outcome: Progressing     Problem: Safety - Adult  Goal: Free from fall injury  Outcome: Progressing   The patient's goals for the shift include      The clinical goals for the shift include Patient will remain safe and free from falls and injury during this shift        Patient alert and oriented x 4 with no pain complaints. Blood pressure medication held this morning d/t soft BP. Dr. Zhao notified. Patient had a dialysis session today and tolerated with no issues. VSS, call light within reach.    Nicol Min RN

## 2025-03-22 NOTE — CARE PLAN
Problem: Pain - Adult  Goal: Verbalizes/displays adequate comfort level or baseline comfort level  Outcome: Progressing     Problem: Safety - Adult  Goal: Free from fall injury  Outcome: Progressing     Problem: Chronic Conditions and Co-morbidities  Goal: Patient's chronic conditions and co-morbidity symptoms are monitored and maintained or improved  Outcome: Progressing     Problem: Fall/Injury  Goal: Not fall by end of shift  Outcome: Progressing  Goal: Be free from injury by end of the shift  Outcome: Progressing  Goal: Verbalize understanding of personal risk factors for fall in the hospital  Outcome: Progressing  Goal: Verbalize understanding of risk factor reduction measures to prevent injury from fall in the home  Outcome: Progressing   The patient's goals for the shift include      The clinical goals for the shift include patient will remain hemodynamically stable

## 2025-03-23 VITALS
HEIGHT: 71 IN | WEIGHT: 152.12 LBS | BODY MASS INDEX: 21.3 KG/M2 | SYSTOLIC BLOOD PRESSURE: 125 MMHG | DIASTOLIC BLOOD PRESSURE: 69 MMHG | RESPIRATION RATE: 18 BRPM | TEMPERATURE: 97.2 F | HEART RATE: 62 BPM | OXYGEN SATURATION: 100 %

## 2025-03-23 LAB
ALBUMIN SERPL BCP-MCNC: 3 G/DL (ref 3.4–5)
ANION GAP SERPL CALC-SCNC: 16 MMOL/L (ref 10–20)
BASOPHILS # BLD MANUAL: 0.05 X10*3/UL (ref 0–0.1)
BASOPHILS NFR BLD MANUAL: 1.7 %
BUN SERPL-MCNC: 15 MG/DL (ref 6–23)
CALCIUM SERPL-MCNC: 8.3 MG/DL (ref 8.6–10.6)
CHLORIDE SERPL-SCNC: 94 MMOL/L (ref 98–107)
CO2 SERPL-SCNC: 32 MMOL/L (ref 21–32)
CREAT SERPL-MCNC: 7.46 MG/DL (ref 0.5–1.3)
DACRYOCYTES BLD QL SMEAR: ABNORMAL
EGFRCR SERPLBLD CKD-EPI 2021: 7 ML/MIN/1.73M*2
EOSINOPHIL # BLD MANUAL: 0.02 X10*3/UL (ref 0–0.7)
EOSINOPHIL NFR BLD MANUAL: 0.8 %
ERYTHROCYTE [DISTWIDTH] IN BLOOD BY AUTOMATED COUNT: 15.6 % (ref 11.5–14.5)
GLUCOSE BLD MANUAL STRIP-MCNC: 197 MG/DL (ref 74–99)
GLUCOSE BLD MANUAL STRIP-MCNC: 206 MG/DL (ref 74–99)
GLUCOSE BLD MANUAL STRIP-MCNC: 292 MG/DL (ref 74–99)
GLUCOSE SERPL-MCNC: 197 MG/DL (ref 74–99)
HCT VFR BLD AUTO: 30.3 % (ref 41–52)
HGB BLD-MCNC: 10 G/DL (ref 13.5–17.5)
HYPOCHROMIA BLD QL SMEAR: ABNORMAL
IMM GRANULOCYTES # BLD AUTO: 0.03 X10*3/UL (ref 0–0.7)
IMM GRANULOCYTES NFR BLD AUTO: 1.1 % (ref 0–0.9)
LYMPHOCYTES # BLD MANUAL: 0.72 X10*3/UL (ref 1.2–4.8)
LYMPHOCYTES NFR BLD MANUAL: 26.5 %
MAGNESIUM SERPL-MCNC: 2.02 MG/DL (ref 1.6–2.4)
MCH RBC QN AUTO: 27.5 PG (ref 26–34)
MCHC RBC AUTO-ENTMCNC: 33 G/DL (ref 32–36)
MCV RBC AUTO: 83 FL (ref 80–100)
MONOCYTES # BLD MANUAL: 0.12 X10*3/UL (ref 0.1–1)
MONOCYTES NFR BLD MANUAL: 4.3 %
NEUTS SEG # BLD MANUAL: 1.57 X10*3/UL (ref 1.2–7)
NEUTS SEG NFR BLD MANUAL: 58.1 %
NRBC BLD-RTO: 0 /100 WBCS (ref 0–0)
OVALOCYTES BLD QL SMEAR: ABNORMAL
PHOSPHATE SERPL-MCNC: 3.7 MG/DL (ref 2.5–4.9)
PLASMA CELLS # BLD MANUAL: 0.07 X10*3/UL
PLASMA CELLS NFR BLD MANUAL: 2.6 %
PLATELET # BLD AUTO: 80 X10*3/UL (ref 150–450)
POTASSIUM SERPL-SCNC: 3.7 MMOL/L (ref 3.5–5.3)
RBC # BLD AUTO: 3.64 X10*6/UL (ref 4.5–5.9)
RBC MORPH BLD: ABNORMAL
SCHISTOCYTES BLD QL SMEAR: ABNORMAL
SODIUM SERPL-SCNC: 138 MMOL/L (ref 136–145)
TACROLIMUS BLD-MCNC: 2.9 NG/ML
TOTAL CELLS COUNTED BLD: 117
VARIANT LYMPHS # BLD MANUAL: 0.16 X10*3/UL (ref 0–0.5)
VARIANT LYMPHS NFR BLD: 6 %
WBC # BLD AUTO: 2.7 X10*3/UL (ref 4.4–11.3)

## 2025-03-23 PROCEDURE — 2500000002 HC RX 250 W HCPCS SELF ADMINISTERED DRUGS (ALT 637 FOR MEDICARE OP, ALT 636 FOR OP/ED): Performed by: STUDENT IN AN ORGANIZED HEALTH CARE EDUCATION/TRAINING PROGRAM

## 2025-03-23 PROCEDURE — 80069 RENAL FUNCTION PANEL: CPT | Performed by: STUDENT IN AN ORGANIZED HEALTH CARE EDUCATION/TRAINING PROGRAM

## 2025-03-23 PROCEDURE — 83735 ASSAY OF MAGNESIUM: CPT | Performed by: STUDENT IN AN ORGANIZED HEALTH CARE EDUCATION/TRAINING PROGRAM

## 2025-03-23 PROCEDURE — 1100000001 HC PRIVATE ROOM DAILY

## 2025-03-23 PROCEDURE — 2500000001 HC RX 250 WO HCPCS SELF ADMINISTERED DRUGS (ALT 637 FOR MEDICARE OP): Performed by: STUDENT IN AN ORGANIZED HEALTH CARE EDUCATION/TRAINING PROGRAM

## 2025-03-23 PROCEDURE — 82947 ASSAY GLUCOSE BLOOD QUANT: CPT

## 2025-03-23 PROCEDURE — 80197 ASSAY OF TACROLIMUS: CPT | Performed by: STUDENT IN AN ORGANIZED HEALTH CARE EDUCATION/TRAINING PROGRAM

## 2025-03-23 PROCEDURE — 99233 SBSQ HOSP IP/OBS HIGH 50: CPT | Performed by: INTERNAL MEDICINE

## 2025-03-23 PROCEDURE — 85007 BL SMEAR W/DIFF WBC COUNT: CPT | Performed by: STUDENT IN AN ORGANIZED HEALTH CARE EDUCATION/TRAINING PROGRAM

## 2025-03-23 PROCEDURE — 36415 COLL VENOUS BLD VENIPUNCTURE: CPT | Performed by: STUDENT IN AN ORGANIZED HEALTH CARE EDUCATION/TRAINING PROGRAM

## 2025-03-23 PROCEDURE — 85027 COMPLETE CBC AUTOMATED: CPT | Performed by: STUDENT IN AN ORGANIZED HEALTH CARE EDUCATION/TRAINING PROGRAM

## 2025-03-23 PROCEDURE — 99233 SBSQ HOSP IP/OBS HIGH 50: CPT | Performed by: HOSPITALIST

## 2025-03-23 PROCEDURE — 2500000004 HC RX 250 GENERAL PHARMACY W/ HCPCS (ALT 636 FOR OP/ED): Performed by: STUDENT IN AN ORGANIZED HEALTH CARE EDUCATION/TRAINING PROGRAM

## 2025-03-23 RX ADMIN — INSULIN GLARGINE 3 UNITS: 100 INJECTION, SOLUTION SUBCUTANEOUS at 17:59

## 2025-03-23 RX ADMIN — CLOTRIMAZOLE: 1 CREAM TOPICAL at 09:28

## 2025-03-23 RX ADMIN — NIFEDIPINE 60 MG: 60 TABLET, FILM COATED, EXTENDED RELEASE ORAL at 20:06

## 2025-03-23 RX ADMIN — CARVEDILOL 37.5 MG: 12.5 TABLET, FILM COATED ORAL at 20:07

## 2025-03-23 RX ADMIN — PRAVASTATIN SODIUM 10 MG: 20 TABLET ORAL at 20:05

## 2025-03-23 RX ADMIN — FLUOCINONIDE: 0.5 OINTMENT TOPICAL at 20:08

## 2025-03-23 RX ADMIN — PANTOPRAZOLE SODIUM 40 MG: 40 TABLET, DELAYED RELEASE ORAL at 09:25

## 2025-03-23 RX ADMIN — TACROLIMUS 0.5 MG: 0.5 CAPSULE ORAL at 06:54

## 2025-03-23 RX ADMIN — ISOSORBIDE MONONITRATE 60 MG: 30 TABLET, EXTENDED RELEASE ORAL at 09:25

## 2025-03-23 RX ADMIN — FLUOCINONIDE: 0.5 OINTMENT TOPICAL at 09:28

## 2025-03-23 RX ADMIN — CLOTRIMAZOLE: 1 CREAM TOPICAL at 20:08

## 2025-03-23 RX ADMIN — HEPARIN SODIUM 5000 UNITS: 5000 INJECTION, SOLUTION INTRAVENOUS; SUBCUTANEOUS at 06:54

## 2025-03-23 RX ADMIN — PREDNISONE 5 MG: 5 TABLET ORAL at 09:25

## 2025-03-23 RX ADMIN — HEPARIN SODIUM 5000 UNITS: 5000 INJECTION, SOLUTION INTRAVENOUS; SUBCUTANEOUS at 14:23

## 2025-03-23 RX ADMIN — NIFEDIPINE 60 MG: 60 TABLET, FILM COATED, EXTENDED RELEASE ORAL at 09:31

## 2025-03-23 RX ADMIN — INSULIN LISPRO 2 UNITS: 100 INJECTION, SOLUTION INTRAVENOUS; SUBCUTANEOUS at 09:26

## 2025-03-23 RX ADMIN — TACROLIMUS 0.5 MG: 0.5 CAPSULE ORAL at 17:59

## 2025-03-23 RX ADMIN — INSULIN LISPRO 1 UNITS: 100 INJECTION, SOLUTION INTRAVENOUS; SUBCUTANEOUS at 17:59

## 2025-03-23 RX ADMIN — HEPARIN SODIUM 5000 UNITS: 5000 INJECTION, SOLUTION INTRAVENOUS; SUBCUTANEOUS at 23:09

## 2025-03-23 RX ADMIN — INSULIN LISPRO 4 UNITS: 100 INJECTION, SOLUTION INTRAVENOUS; SUBCUTANEOUS at 12:46

## 2025-03-23 RX ADMIN — ASCORBIC ACID, THIAMINE MONONITRATE,RIBOFLAVIN, NIACINAMIDE, PYRIDOXINE HYDROCHLORIDE, FOLIC ACID, CYANOCOBALAMIN, BIOTIN, CALCIUM PANTOTHENATE, 1 CAPSULE: 100; 1.5; 1.7; 20; 10; 1; 6000; 150000; 5 CAPSULE, LIQUID FILLED ORAL at 09:25

## 2025-03-23 ASSESSMENT — COGNITIVE AND FUNCTIONAL STATUS - GENERAL
WALKING IN HOSPITAL ROOM: A LITTLE
CLIMB 3 TO 5 STEPS WITH RAILING: A LITTLE
MOBILITY SCORE: 22
DAILY ACTIVITIY SCORE: 23
DRESSING REGULAR LOWER BODY CLOTHING: A LITTLE

## 2025-03-23 ASSESSMENT — PAIN SCALES - GENERAL
PAINLEVEL_OUTOF10: 0 - NO PAIN
PAINLEVEL_OUTOF10: 0 - NO PAIN

## 2025-03-23 ASSESSMENT — PAIN - FUNCTIONAL ASSESSMENT
PAIN_FUNCTIONAL_ASSESSMENT: 0-10
PAIN_FUNCTIONAL_ASSESSMENT: 0-10

## 2025-03-23 NOTE — PROGRESS NOTES
Transplant Nephrology progress note     Date of admission: 3/18/2025     Manolo Bashir is a 69 y.o.  with PMH   Past Medical History:   Diagnosis Date    Anemia     Arthritis     Cataract     Chronic kidney disease, stage 3 unspecified (Multi) 09/26/2018    Stage 3 chronic kidney disease    CKD (chronic kidney disease)     stage V    Cough 02/12/2024    COVID-19 06/18/2020    COVID-19 virus infection    Diabetes (Multi)     ESRD (end stage renal disease) (Multi)     Focal and segmental proliferative glomerulonephritis 12/23/2023    HTN (hypertension)     Hyperlipidemia     Other long term (current) drug therapy 07/20/2021    High risk medication use    Personal history of other diseases of the circulatory system     Personal history of cardiac murmur    Personal history of other infectious and parasitic diseases 08/17/2015    History of hepatitis    Polyp, colonic 08/17/2023    Primary osteoarthritis of both ankles 08/17/2023    Prostate cancer (Multi)     Tubular adenoma of colon 08/17/2023    Unspecified kidney failure 08/17/2016    Renal failure        SUBJECTIVE:  Denied any complaints this morning.      PROBLEM LIST:  Assessment & Plan  Hyperkalemia           ALLERGIES:  No Known Allergies         CURRENT MEDICATIONS:  Scheduled medications  carvedilol, 37.5 mg, oral, BID  [Held by provider] cinacalcet, 30 mg, oral, Daily  clotrimazole, , Topical, BID  dextrose, 25 g, intravenous, Once  fluocinonide, , Topical, BID  heparin (porcine), 5,000 Units, subcutaneous, q8h ZOË  imiquimod, 1 packet, Topical, Once per day on Monday Wednesday Friday  insulin glargine, 3 Units, subcutaneous, q24h  insulin lispro, 0-5 Units, subcutaneous, TID AC  isosorbide mononitrate ER, 60 mg, oral, Daily  NIFEdipine ER, 60 mg, oral, BID  pantoprazole, 40 mg, oral, Daily before breakfast  pravastatin, 10 mg, oral, Nightly  predniSONE, 5 mg, oral, Daily  [Held by provider] sevelamer carbonate, 800 mg, oral, TID AC  tacrolimus, 0.5 mg,  "oral, BID  vitamin B complex-vitamin C-folic acid, 1 capsule, oral, Daily      Continuous medications       PRN medications  PRN medications: acetaminophen, dextrose, dextrose, glucagon, glucagon, trimethobenzamide       OBJECTIVE:    VITALS: Visit Vitals  /64   Pulse 53   Temp 36.4 °C (97.5 °F)   Resp 18   Ht 1.803 m (5' 11\")   Wt 69 kg (152 lb 1.9 oz)   SpO2 97%   BMI 21.22 kg/m²   Smoking Status Never   BSA 1.86 m²        General: No distress   CVS: S1 S2 no murmurs  RESP:  Lungs clear to auscultation   ABDO: Soft, non-tender   Neuro: A + O x 3  Skin: No rash   Extremities: No edema  Access: LUE AVF       LABS:  Results from last 72 hours   Lab Units 03/23/25  0650 03/22/25  0556   WBC AUTO x10*3/uL 2.7* 2.6*   HEMOGLOBIN g/dL 10.0* 10.0*   MCV fL 83 86   PLATELETS AUTO x10*3/uL 80* 80*   BUN mg/dL 15 34*   CREATININE mg/dL 7.46* 11.62*   CALCIUM mg/dL 8.3* 8.4*   TACROLIMUS ng/mL  --  3.4            Intake/Output Summary (Last 24 hours) at 3/23/2025 1122  Last data filed at 3/22/2025 1606  Gross per 24 hour   Intake 1000 ml   Output 1400 ml   Net -400 ml          ASSESSMENT AND PLAN:  Manolo Bashir is a 69 y.o. male presenting with LLQ allograft tenderness and gross hematuria.  He has ESKD attributed to hypertension since 1998 s/p kidney transplant #1 3/26/1992 that failed 11/13/2006), s/p kidney transplant #2 7/13/2013 which failed in May 2024 following which he has been on HD (TTS, [employer] via LUE AVG) . He also has known MGUS with IgG and IgA lambda (bone marrow bx 10/2023 with no plasma cell dyscrasia), DM2, HTN, prostate cancer s/p radical prostatectomy, baseline urinary incontinence, HCV s/p treatment (recent HCV PCR negative 7/2024).  Currently presented with a missed dialysis in the setting of diarrhea      #ESRD TTS   -HD at [facility], nephrologist Dr. Jittirat, DW 69kg  -Will resume Tuesday Thursday Saturday dialysis.  No acute indication today.  -Awaiting on IR fistulogram for fistula " stenosis.    #Diarrhea  -CMV, C diff negative  -Rotavirus positive -improving     # Immunosuppression-Tac 0.5mg BID, Pred 5mg daily  #Graft intolerance/chronic rejection - transplant nephrectomy outpatient planned    #Hypertension -> blood pressures are optimal, continue with the nifedipine, Imdur, carvedilol.   #CKD-MBD -calcium and phosphorus levels are optimal.      Kaycee Arroyo MD

## 2025-03-23 NOTE — CARE PLAN
Problem: Skin  Goal: Promote skin healing  Outcome: Progressing  Flowsheets (Taken 3/23/2025 1233)  Promote skin healing:   Turn/reposition every 2 hours/use positioning/transfer devices   Rotate device position/do not position patient on device   Assess skin/pad under line(s)/device(s)     Problem: Skin  Goal: Prevent/minimize sheer/friction injuries  Outcome: Progressing     Problem: Skin  Goal: Prevent/manage excess moisture  Outcome: Progressing   The patient's goals for the shift include      The clinical goals for the shift include Patient will be free from falls and injury during htis shift    Patient alert and oriented x 4 with no pain complaints. Carveidolol held d/t patient being bradycardic in the 50s. Patient stated he is now having formed stool. Insulin coverage provided based off SS. Patient awaiting fistulagram. VSS, call light within reach. Family by the bedside.    Nicol Min RN

## 2025-03-23 NOTE — PROGRESS NOTES
Kelvin Bashir is a 69 y.o. male on day 4 of admission presenting with Hyperkalemia.      Subjective   Mr. Bashir was seen and evaluated. He denied fever and chills. He noted complete resolution of diarrhea. He is aware of plans to continue current treatment while awaiting for IR intervention for AV stenosis.     Objective     Last Recorded Vitals  /66   Pulse 56   Temp 36.3 °C (97.3 °F)   Resp 18   Wt 69 kg (152 lb 1.9 oz)   SpO2 100%   Intake/Output last 3 Shifts:    Intake/Output Summary (Last 24 hours) at 3/23/2025 1448  Last data filed at 3/22/2025 1606  Gross per 24 hour   Intake 600 ml   Output 1400 ml   Net -800 ml       Admission Weight  Weight: 65.8 kg (145 lb) (03/19/25 0800)    Daily Weight  03/21/25 : 69 kg (152 lb 1.9 oz)    Image Results  Vascular US upper extremity hemodialysis access duplex left              [place] [id number]    Tel [phone] and Fax [phone]       Vascular Lab Report  VASC US UPPER EXTREMITY HEMODIALYSIS ACCESS DUPLEX LEFT       Patient Name:     KELVIN BASHIR      Reading Physician: [id number] Jeni Perez MD  Study Date:       3/19/2025           Ordering           [id number] HUANG GUTIERREZ                                        Physician:  MRN/PID:          [id number]            Technologist:      Dre Lane RVT  Accession#:       [id number]        Technologist 2:  Date of           1955 / [birth year]      Encounter#:        [id number]  Birth/Age:        years  Gender:           M  Admission Status: Inpatient           Location           [facility]                                        Performed:       Diagnosis/ICD: Other complications of surgical or medical care, not elsewhere                 classified-T88.8XXS  Indication:    Complication of previous AVF  CPT Codes:     02448 Duplex Hemodialysis Access       **CRITICAL RESULT**  Critical Result:  Stenosis at the arterial anastomosis.  Notification called to Anay Arguelles MD on 3/19/2025 at 4:58:21 PM. Acknowledged critical results notification communicated via Epic secure chat by Dre Lane RVT.     CONCLUSIONS:  Dialysis Access Evaluation: Left brachial-axillary AVG shows an increased velocity at the arterial anastomosis which may be indicative of a stenosis.     Imaging & Doppler Findings:     Dialysis Access Graft                  Left Velocity  Arterial Anast  621 cm/s  Flow Prox       411 cm/s  Flow Prox/Mid   220 cm/s  Mid             159 cm/s  Mid/Flow Distal 244 cm/s  Flow Distal     241 cm/s  Outflow Anast   215 cm/s  Outflow Vein    70 cm/s  Pre Anast       214 cm/s  Post Anast      85 cm/s       [id number] Jeni Perez MD  Electronically signed by [id number] Jeni Perez MD on 3/20/2025 at 9:25:12 AM       ** Final **  ECG 12 Lead  See ED provider note for full interpretation and clinical correlation  Confirmed by Zac Matamoros ([id number]) on 3/20/2025 5:16:39 AM  ECG 12 Lead  See ED provider note for full interpretation and clinical correlation  Confirmed by Zac Matamoros ([id number]) on 3/20/2025 5:16:14 AM      Physical Exam  Constitutional:       Appearance: Normal appearance.   HENT:      Head: Normocephalic and atraumatic.      Mouth/Throat:      Mouth: Mucous membranes are moist.   Eyes:      Extraocular Movements: Extraocular movements intact.      Conjunctiva/sclera: Conjunctivae normal.      Pupils: Pupils are equal, round, and reactive to light.   Cardiovascular:      Rate and Rhythm: Normal rate and regular rhythm.      Pulses: Normal pulses.      Heart sounds: Normal heart sounds.   Pulmonary:      Effort: Pulmonary effort is normal.      Breath sounds: Normal breath sounds.   Abdominal:      General: Bowel sounds are normal.      Palpations: Abdomen is soft.   Musculoskeletal:      Cervical back: Neck supple.   Skin:     General: Skin is warm.   Neurological:      General: No focal deficit  present.      Mental Status: He is alert and oriented to person, place, and time.   Psychiatric:         Mood and Affect: Mood normal.         Behavior: Behavior normal.         Relevant Results                   Assessment & Plan  Hyperkalemia  Resolved    Hypovolemic shock:  resolved.    Diarrhea due to Rotavirus: now having formed stool.  Continue isolation.     Hypotension due to Hypovolemic shock:  resume BP medication    ESRD on HD hx of renal transplant and immunosuppression:    Left AV fistula stenosis.  IR consulted for fistolugram.  Continue home meds  Continue dialysis TTHS,  Last dialysis was yesterday     Hx of Hypertension:   Good control  Continue current meds    Acute Diarrheal Illness:    resolved    Elevated Troponin: no chest pain  Trop now trending down  Suspects this is due to hypovolemic shock and type 2 demand ischemia.   Continue to monitor    AV Fistula stenosis: defer to IR for management.         Jaquan Zhao MD

## 2025-03-23 NOTE — ASSESSMENT & PLAN NOTE
Resolved    Hypovolemic shock:  resolved.    Diarrhea due to Rotavirus: now having formed stool.  Continue isolation.     Hypotension due to Hypovolemic shock:  resume BP medication    ESRD on HD hx of renal transplant and immunosuppression:    Left AV fistula stenosis.  IR consulted for fistolugram.  Continue home meds  Continue dialysis TTHS,  Last dialysis was yesterday     Hx of Hypertension:   Good control  Continue current meds    Acute Diarrheal Illness:    resolved    Elevated Troponin: no chest pain  Trop now trending down  Suspects this is due to hypovolemic shock and type 2 demand ischemia.   Continue to monitor    AV Fistula stenosis: defer to IR for management.

## 2025-03-24 ENCOUNTER — APPOINTMENT (OUTPATIENT)
Dept: RADIOLOGY | Facility: HOSPITAL | Age: 70
DRG: 640 | End: 2025-03-24
Payer: MEDICARE

## 2025-03-24 LAB
BASOPHILS # BLD MANUAL: 0 X10*3/UL (ref 0–0.1)
BASOPHILS NFR BLD MANUAL: 0 %
EOSINOPHIL # BLD MANUAL: 0.04 X10*3/UL (ref 0–0.7)
EOSINOPHIL NFR BLD MANUAL: 1.7 %
ERYTHROCYTE [DISTWIDTH] IN BLOOD BY AUTOMATED COUNT: 15.8 % (ref 11.5–14.5)
GLUCOSE BLD MANUAL STRIP-MCNC: 157 MG/DL (ref 74–99)
GLUCOSE BLD MANUAL STRIP-MCNC: 224 MG/DL (ref 74–99)
GLUCOSE BLD MANUAL STRIP-MCNC: 249 MG/DL (ref 74–99)
HCT VFR BLD AUTO: 29.1 % (ref 41–52)
HGB BLD-MCNC: 9.5 G/DL (ref 13.5–17.5)
HYPOCHROMIA BLD QL SMEAR: ABNORMAL
IMM GRANULOCYTES # BLD AUTO: 0.02 X10*3/UL (ref 0–0.7)
IMM GRANULOCYTES NFR BLD AUTO: 0.8 % (ref 0–0.9)
LYMPHOCYTES # BLD MANUAL: 0.74 X10*3/UL (ref 1.2–4.8)
LYMPHOCYTES NFR BLD MANUAL: 29.6 %
MAGNESIUM SERPL-MCNC: 1.93 MG/DL (ref 1.6–2.4)
MCH RBC QN AUTO: 27.5 PG (ref 26–34)
MCHC RBC AUTO-ENTMCNC: 32.6 G/DL (ref 32–36)
MCV RBC AUTO: 84 FL (ref 80–100)
MONOCYTES # BLD MANUAL: 0.04 X10*3/UL (ref 0.1–1)
MONOCYTES NFR BLD MANUAL: 1.7 %
NEUTS SEG # BLD MANUAL: 1.52 X10*3/UL (ref 1.2–7)
NEUTS SEG NFR BLD MANUAL: 60.9 %
NRBC BLD-RTO: 0 /100 WBCS (ref 0–0)
OVALOCYTES BLD QL SMEAR: ABNORMAL
PLASMA CELLS # BLD MANUAL: 0.04 X10*3/UL
PLASMA CELLS NFR BLD MANUAL: 1.7 %
PLATELET # BLD AUTO: 63 X10*3/UL (ref 150–450)
PROMYELOCYTES # BLD MANUAL: 0.02 X10*3/UL
PROMYELOCYTES NFR BLD MANUAL: 0.9 %
RBC # BLD AUTO: 3.46 X10*6/UL (ref 4.5–5.9)
RBC MORPH BLD: ABNORMAL
TACROLIMUS BLD-MCNC: 2.3 NG/ML
TOTAL CELLS COUNTED BLD: 115
VARIANT LYMPHS # BLD MANUAL: 0.09 X10*3/UL (ref 0–0.5)
VARIANT LYMPHS NFR BLD: 3.5 %
WBC # BLD AUTO: 2.5 X10*3/UL (ref 4.4–11.3)

## 2025-03-24 PROCEDURE — 80197 ASSAY OF TACROLIMUS: CPT | Performed by: STUDENT IN AN ORGANIZED HEALTH CARE EDUCATION/TRAINING PROGRAM

## 2025-03-24 PROCEDURE — 2500000004 HC RX 250 GENERAL PHARMACY W/ HCPCS (ALT 636 FOR OP/ED): Performed by: STUDENT IN AN ORGANIZED HEALTH CARE EDUCATION/TRAINING PROGRAM

## 2025-03-24 PROCEDURE — 36415 COLL VENOUS BLD VENIPUNCTURE: CPT | Performed by: STUDENT IN AN ORGANIZED HEALTH CARE EDUCATION/TRAINING PROGRAM

## 2025-03-24 PROCEDURE — 82947 ASSAY GLUCOSE BLOOD QUANT: CPT

## 2025-03-24 PROCEDURE — 85027 COMPLETE CBC AUTOMATED: CPT | Performed by: STUDENT IN AN ORGANIZED HEALTH CARE EDUCATION/TRAINING PROGRAM

## 2025-03-24 PROCEDURE — 2500000002 HC RX 250 W HCPCS SELF ADMINISTERED DRUGS (ALT 637 FOR MEDICARE OP, ALT 636 FOR OP/ED): Performed by: STUDENT IN AN ORGANIZED HEALTH CARE EDUCATION/TRAINING PROGRAM

## 2025-03-24 PROCEDURE — 2500000001 HC RX 250 WO HCPCS SELF ADMINISTERED DRUGS (ALT 637 FOR MEDICARE OP): Performed by: STUDENT IN AN ORGANIZED HEALTH CARE EDUCATION/TRAINING PROGRAM

## 2025-03-24 PROCEDURE — 99233 SBSQ HOSP IP/OBS HIGH 50: CPT | Performed by: INTERNAL MEDICINE

## 2025-03-24 PROCEDURE — 97530 THERAPEUTIC ACTIVITIES: CPT | Mod: GP

## 2025-03-24 PROCEDURE — 97161 PT EVAL LOW COMPLEX 20 MIN: CPT | Mod: GP

## 2025-03-24 PROCEDURE — 83735 ASSAY OF MAGNESIUM: CPT | Performed by: STUDENT IN AN ORGANIZED HEALTH CARE EDUCATION/TRAINING PROGRAM

## 2025-03-24 PROCEDURE — 1100000001 HC PRIVATE ROOM DAILY

## 2025-03-24 PROCEDURE — 85007 BL SMEAR W/DIFF WBC COUNT: CPT | Performed by: STUDENT IN AN ORGANIZED HEALTH CARE EDUCATION/TRAINING PROGRAM

## 2025-03-24 RX ADMIN — CLOTRIMAZOLE: 1 CREAM TOPICAL at 21:56

## 2025-03-24 RX ADMIN — ISOSORBIDE MONONITRATE 60 MG: 30 TABLET, EXTENDED RELEASE ORAL at 08:30

## 2025-03-24 RX ADMIN — INSULIN LISPRO 3 UNITS: 100 INJECTION, SOLUTION INTRAVENOUS; SUBCUTANEOUS at 17:58

## 2025-03-24 RX ADMIN — TACROLIMUS 0.5 MG: 0.5 CAPSULE ORAL at 17:58

## 2025-03-24 RX ADMIN — INSULIN LISPRO 1 UNITS: 100 INJECTION, SOLUTION INTRAVENOUS; SUBCUTANEOUS at 13:16

## 2025-03-24 RX ADMIN — NIFEDIPINE 60 MG: 60 TABLET, FILM COATED, EXTENDED RELEASE ORAL at 08:30

## 2025-03-24 RX ADMIN — FLUOCINONIDE: 0.5 OINTMENT TOPICAL at 21:56

## 2025-03-24 RX ADMIN — FLUOCINONIDE: 0.5 OINTMENT TOPICAL at 08:30

## 2025-03-24 RX ADMIN — TACROLIMUS 0.5 MG: 0.5 CAPSULE ORAL at 06:01

## 2025-03-24 RX ADMIN — PREDNISONE 5 MG: 5 TABLET ORAL at 08:30

## 2025-03-24 RX ADMIN — ASCORBIC ACID, THIAMINE MONONITRATE,RIBOFLAVIN, NIACINAMIDE, PYRIDOXINE HYDROCHLORIDE, FOLIC ACID, CYANOCOBALAMIN, BIOTIN, CALCIUM PANTOTHENATE, 1 CAPSULE: 100; 1.5; 1.7; 20; 10; 1; 6000; 150000; 5 CAPSULE, LIQUID FILLED ORAL at 08:30

## 2025-03-24 RX ADMIN — CARVEDILOL 37.5 MG: 12.5 TABLET, FILM COATED ORAL at 21:52

## 2025-03-24 RX ADMIN — INSULIN GLARGINE 3 UNITS: 100 INJECTION, SOLUTION SUBCUTANEOUS at 17:58

## 2025-03-24 RX ADMIN — INSULIN LISPRO 2 UNITS: 100 INJECTION, SOLUTION INTRAVENOUS; SUBCUTANEOUS at 08:31

## 2025-03-24 RX ADMIN — CLOTRIMAZOLE: 1 CREAM TOPICAL at 08:30

## 2025-03-24 RX ADMIN — PRAVASTATIN SODIUM 10 MG: 20 TABLET ORAL at 21:52

## 2025-03-24 RX ADMIN — NIFEDIPINE 60 MG: 60 TABLET, FILM COATED, EXTENDED RELEASE ORAL at 21:52

## 2025-03-24 RX ADMIN — HEPARIN SODIUM 5000 UNITS: 5000 INJECTION, SOLUTION INTRAVENOUS; SUBCUTANEOUS at 06:01

## 2025-03-24 RX ADMIN — CARVEDILOL 37.5 MG: 12.5 TABLET, FILM COATED ORAL at 08:30

## 2025-03-24 RX ADMIN — IMIQUIMOD 1 PACKET: 12.5 CREAM TOPICAL at 08:30

## 2025-03-24 ASSESSMENT — COGNITIVE AND FUNCTIONAL STATUS - GENERAL
CLIMB 3 TO 5 STEPS WITH RAILING: A LITTLE
CLIMB 3 TO 5 STEPS WITH RAILING: A LITTLE
DAILY ACTIVITIY SCORE: 24
MOBILITY SCORE: 22
WALKING IN HOSPITAL ROOM: A LITTLE
MOBILITY SCORE: 23

## 2025-03-24 ASSESSMENT — PAIN SCALES - GENERAL
PAINLEVEL_OUTOF10: 0 - NO PAIN
PAINLEVEL_OUTOF10: 0 - NO PAIN

## 2025-03-24 ASSESSMENT — PAIN - FUNCTIONAL ASSESSMENT
PAIN_FUNCTIONAL_ASSESSMENT: 0-10
PAIN_FUNCTIONAL_ASSESSMENT: 0-10

## 2025-03-24 ASSESSMENT — ACTIVITIES OF DAILY LIVING (ADL): ADL_ASSISTANCE: INDEPENDENT

## 2025-03-24 NOTE — PROGRESS NOTES
Manolo Bashir is a 69 y.o. male on day 5 of admission presenting with Hyperkalemia.      Subjective   Mr. Bashir reports that he is doing well. He denied fever and chills. No chest pain. No nausea or vomiting  He noted that diarrhea has completely resolved.     I did discuss management plans with him. He is aware that goal is to have IR evaluate his AV fistula.     Objective     Last Recorded Vitals  /70   Pulse 59   Temp 36.2 °C (97.2 °F)   Resp 18   Wt 69 kg (152 lb 1.9 oz)   SpO2 99%   Intake/Output last 3 Shifts:  No intake or output data in the 24 hours ending 03/24/25 1027    Admission Weight  Weight: 65.8 kg (145 lb) (03/19/25 0800)    Daily Weight  03/21/25 : 69 kg (152 lb 1.9 oz)    Image Results  ECG 12 lead  Normal sinus rhythm  Left axis deviation  Nonspecific intraventricular block  Cannot rule out Septal infarct , age undetermined  Abnormal ECG  When compared with ECG of 19-MAR-2025 09:10,  Previous ECG has undetermined rhythm, needs review  Nonspecific intraventricular block has replaced Left bundle branch block  Minimal criteria for Septal infarct are now Present  T wave inversion now evident in Inferior leads      Physical Exam  Constitutional:       Appearance: Normal appearance.   HENT:      Head: Normocephalic and atraumatic.      Nose: Nose normal.      Mouth/Throat:      Mouth: Mucous membranes are dry.   Eyes:      Extraocular Movements: Extraocular movements intact.      Pupils: Pupils are equal, round, and reactive to light.   Cardiovascular:      Rate and Rhythm: Normal rate and regular rhythm.      Pulses: Normal pulses.      Heart sounds: Normal heart sounds.   Pulmonary:      Effort: Pulmonary effort is normal.      Breath sounds: Normal breath sounds.   Abdominal:      General: Bowel sounds are normal.      Palpations: Abdomen is soft.   Musculoskeletal:         General: Normal range of motion.      Cervical back: Normal range of motion and neck supple.   Neurological:       General: No focal deficit present.      Mental Status: He is alert and oriented to person, place, and time.   Psychiatric:         Mood and Affect: Mood normal.         Behavior: Behavior normal.         Relevant Results               Assessment & Plan  Hyperkalemia  Resolved. Additional treatment with HD    Hypovolemic shock:  this is due to acute diarrhea.  Continue to monitor    Diarrhea due to Rotavirus:   Still on isolation.  Continue to monitor  Per patient he is now having regular/formed BM    Hypotension due to Hypovolemic shock:  resume home meds    ESRD on HD hx of renal transplant and immunosuppression:    Appreciate IR recommendation  Patient stable for discharge, but will await for possible fistulogram.  Dialysis per nephro  Continue immunosuppression meds.   Tacrolimus levels are normal so far    Hx of Hypertension:   Good control  Continue current meds    Acute Diarrheal Illness:    resolved    Elevated Troponin:   Suspects demand ischemia from hypovolemic shock in ESRD patient.  Continue to monitor  No chest pain.   Trop trending down.     AV Fistula stenosis: defer to IR for management.         Jaquan Zhao MD

## 2025-03-24 NOTE — PROGRESS NOTES
03/24/25 1252   Discharge Planning   Who is requesting discharge planning? Provider   Home or Post Acute Services None   Expected Discharge Disposition Home     Transitional Care Coordination Progress Note:  Patient discussed during interdisciplinary rounds.  Team members present: ABIDA ZABALA  Plan per Medical/Surgical team: Pt presenting with hyperkalemia requiring urgent dialysis. Per attending pt needs IR AV fistulogram then can be discharged home today afterwards, no home going needs anticipated.  Payer: Medicare A/B, Medicaid  Status: Inpatient  Discharge disposition: Home  Potential Barriers: none  ADOD: 3/24  Care coordinator will continue to follow for discharge planning needs.     Joyce López RN  Transitional Care Coordinator (TCC)  [phone] or [phone]

## 2025-03-24 NOTE — PROGRESS NOTES
Physical Therapy    Physical Therapy Evaluation & Treatment    Patient Name: Manolo Bashir  MRN: [id number]  Department: [place] [address]  Room: [address] [place] [address]  Today's Date: 3/24/2025   Time Calculation  Start Time: 0843  Stop Time: 0906  Time Calculation (min): 23 min    Assessment/Plan   PT Assessment  PT Assessment Results: Decreased mobility  Rehab Prognosis: Excellent  Barriers to Discharge Home: No anticipated barriers  End of Session Communication: Bedside nurse  Assessment Comment: Pt mobilizing well, encouraged continued OOB activity with possible PT followup 1x more while inpatient for further gait and stair navigation though this would not be a barrier to discharge.  End of Session Patient Position: Up in room, Alarm off, not on at start of session   IP OR SWING BED PT PLAN  Inpatient or Swing Bed: Inpatient  PT Plan  Treatment/Interventions: Gait training, Stair training  PT Plan: Ongoing PT  PT Frequency: One time follow up visit  PT Discharge Recommendations: No PT needed after discharge  PT Recommended Transfer Status: Independent  PT - OK to Discharge: Yes (meaning POC/goals/discharge rec intensity created)      Subjective     General Visit Information:  General  Reason for Referral: missed dialysis, hyperkalemia, hypotension, rotavirus  Past Medical History Relevant to Rehab: ESRD s/p renal transplants x2, CKD, MGUS, DM, HTN, prostate CA s/p prostectomy, anemia, Hep C, goiter, urinary incontinence  General Comment: Pt agreeable to participate, reports has been amb in room well, denies balance issues.  Demos overall (S)-> (I) mobility.  Would like for PT to check in on him if still inpatient in next few days.  Home Living:  Home Living  Type of Home: House  Lives With: Significant other  Home Adaptive Equipment: Walker rolling or standard, Cane  Home Layout: Stairs to alternate level with rails, Bed/bath upstairs  Alternate Level Stairs-Number of Steps: 12  Home Access: Stairs to enter with rails  Entrance  Stairs-Number of Steps: 6  Bathroom Equipment: Raised toilet seat without rails  Prior Level of Function:  Prior Function Per Pt/Caregiver Report  ADL Assistance: Independent  Ambulatory Assistance:  (reports (I) without AD)  Precautions:  Precautions  Hearing/Visual Limitations: WFL  Medical Precautions: Fall precautions (contact)    Objective   Pain:  Pain Assessment  Pain Assessment: 0-10  0-10 (Numeric) Pain Score: 0 - No pain  Cognition:  Cognition  Arousal/Alertness: Appropriate responses to stimuli  Orientation Level: Oriented X4  Following Commands: Follows all commands and directions without difficulty    General Assessments:     Activity Tolerance  Endurance: Endurance does not limit participation in activity    Sensation  Sensation Comment: N/T (B) hands intermittently        Perception  Inattention/Neglect: Appears intact    Coordination  Movements are Fluid and Coordinated: Yes    Postural Control  Postural Control: Within Functional Limits    Static Sitting Balance  Static Sitting-Balance Support: Feet supported  Static Sitting-Level of Assistance: Independent    Static Standing Balance  Static Standing-Balance Support: No upper extremity supported  Static Standing-Level of Assistance: Distant supervision, Independent  Dynamic Standing Balance  Dynamic Standing-Balance Support: No upper extremity supported  Dynamic Standing-Level of Assistance: Distant supervision, Independent  Functional Assessments:  Bed Mobility  Bed Mobility:  (seated EOB/ up in room upon PT entry and exit, reports no issues with bed mobility)    Transfers  Transfer: Yes  Transfer 1  Transfer From 1: Bed to  Transfer to 1: Stand  Transfer Level of Assistance 1: Distant supervision, Independent    Ambulation/Gait Training  Ambulation/Gait Training Performed: Yes  Ambulation/Gait Training 1  Surface 1: Level tile  Device 1: No device  Assistance 1: Distant supervision, Independent  Quality of Gait 1:  (no overt  deviations)  Comments/Distance (ft) 1: 20 ft  Extremity/Trunk Assessments:  RLE   RLE : Within Functional Limits  LLE   LLE : Within Functional Limits  Treatments:  Therapeutic Activity  Therapeutic Activity Performed: Yes  Therapeutic Activity 1: Completes gown donning in standing at bedside, able to maintain balance appropriately to tie gown with hands overhead with (S).  Able to maintain balance while collecting items around room to tidy up, reach forward/bend forward without LOB.    Tinetti  Sitting Balance: Steady, safe  Arises: Able without using arms  Attempts to Arise: Able to arise, one attempt  Immediate Standing Balance (First 5 Seconds): Steady without walker or other support  Standing Balance: Narrow stance without support  Nudged: Steady without walker or other support  Eyes Closed: Steady  Turned 360 Degrees: Steadiness: Steady  Turned 360 Degrees: Continuity of Steps: Discontinuous steps  Sitting Down: Uses arms or not a smooth motion  Balance Score: 14  Initiation of Gait: No hesitancy  Step Height: R Swing Foot: Right foot complete clears floor  Step Length: R Swing Foot: Passes left stance foot  Step Height: L Swing Foot: Left foot complete clears floor  Step Length: L Swing Foot: Passes right stance foot  Step Symmetry: Right and left step appear equal  Step Continuity: Stopping or discontinuity between steps  Path: Straight without walking aid  Trunk: No sway but flexion of knees or back or spreads arms out while walking  Walking Time: Heels almost touching while walking  Gait Score: 10  Total Score: 24    Outcome Measures:  [facility] Basic Mobility  Turning from your back to your side while in a flat bed without using bedrails: None  Moving from lying on your back to sitting on the side of a flat bed without using bedrails: None  Moving to and from bed to chair (including a wheelchair): None  Standing up from a chair using your arms (e.g. wheelchair or bedside chair): None  To walk in hospital  room: A little  Climbing 3-5 steps with railing: A little  Basic Mobility - Total Score: 22    Encounter Problems       Encounter Problems (Active)       Balance       Will score> 24 on Tinetti to demo decreased risk of falls.        Start:  03/24/25    Expected End:  04/07/25               Mobility       STG - Patient will ambulate (I), 150ft x 2.        Start:  03/24/25    Expected End:  04/07/25            STG - Patient will ascend and descend a flight of stairs (S)-> (I).        Start:  03/24/25    Expected End:  04/07/25                   Education Documentation  Mobility Training, taught by Alexandra Nieves PT at 3/24/2025 10:00 AM.  Learner: Patient  Readiness: Acceptance  Method: Explanation  Response: Verbalizes Understanding  Comment: PT POC, increasing activity tolerance      03/24/25 at 10:04 AM - Alexandra Nieves PT

## 2025-03-24 NOTE — ASSESSMENT & PLAN NOTE
Resolved. Additional treatment with HD    Hypovolemic shock:  this is due to acute diarrhea.  Continue to monitor    Diarrhea due to Rotavirus:   Still on isolation.  Continue to monitor  Per patient he is now having regular/formed BM    Hypotension due to Hypovolemic shock:  resume home meds    ESRD on HD hx of renal transplant and immunosuppression:    Appreciate IR recommendation  Patient stable for discharge, but will await for possible fistulogram.  Dialysis per nephro  Continue immunosuppression meds.   Tacrolimus levels are normal so far    Hx of Hypertension:   Good control  Continue current meds    Acute Diarrheal Illness:    resolved    Elevated Troponin:   Suspects demand ischemia from hypovolemic shock in ESRD patient.  Continue to monitor  No chest pain.   Trop trending down.     AV Fistula stenosis: defer to IR for management.

## 2025-03-24 NOTE — NURSING NOTE
"Visit for diabetes education based on elevated A1c and BG values when reviewing in EMR.  Mr. Bashir states his BG's are better managed at home but higher here.  He is aware that BG elevated based on overall health status.  States he takes 10 units Lantus insulin at home -- \"they're not giving me my whole dose here\"  He has provider at [facility] [place] [facility] but has missed appointments 2' hospitalizations.    He has no difficulty with monitoring BG or using insulin pen or managing hypoglycemia.  Has no specific education needs at this time.  Will sign off at this time.    "

## 2025-03-25 ENCOUNTER — APPOINTMENT (OUTPATIENT)
Dept: RADIOLOGY | Facility: HOSPITAL | Age: 70
DRG: 640 | End: 2025-03-25
Payer: MEDICARE

## 2025-03-25 ENCOUNTER — APPOINTMENT (OUTPATIENT)
Dept: DIALYSIS | Facility: HOSPITAL | Age: 70
End: 2025-03-25
Payer: MEDICARE

## 2025-03-25 LAB
BASOPHILS # BLD AUTO: 0.02 X10*3/UL (ref 0–0.1)
BASOPHILS NFR BLD AUTO: 0.7 %
EOSINOPHIL # BLD AUTO: 0.2 X10*3/UL (ref 0–0.7)
EOSINOPHIL NFR BLD AUTO: 6.7 %
ERYTHROCYTE [DISTWIDTH] IN BLOOD BY AUTOMATED COUNT: 15.5 % (ref 11.5–14.5)
GLUCOSE BLD MANUAL STRIP-MCNC: 142 MG/DL (ref 74–99)
GLUCOSE BLD MANUAL STRIP-MCNC: 144 MG/DL (ref 74–99)
GLUCOSE BLD MANUAL STRIP-MCNC: 155 MG/DL (ref 74–99)
GLUCOSE BLD MANUAL STRIP-MCNC: 262 MG/DL (ref 74–99)
GLUCOSE BLD MANUAL STRIP-MCNC: 311 MG/DL (ref 74–99)
GLUCOSE BLD MANUAL STRIP-MCNC: 333 MG/DL (ref 74–99)
HCT VFR BLD AUTO: 31.3 % (ref 41–52)
HGB BLD-MCNC: 9.7 G/DL (ref 13.5–17.5)
IMM GRANULOCYTES # BLD AUTO: 0.03 X10*3/UL (ref 0–0.7)
IMM GRANULOCYTES NFR BLD AUTO: 1 % (ref 0–0.9)
LYMPHOCYTES # BLD AUTO: 0.86 X10*3/UL (ref 1.2–4.8)
LYMPHOCYTES NFR BLD AUTO: 28.9 %
MAGNESIUM SERPL-MCNC: 1.85 MG/DL (ref 1.6–2.4)
MCH RBC QN AUTO: 27.1 PG (ref 26–34)
MCHC RBC AUTO-ENTMCNC: 31 G/DL (ref 32–36)
MCV RBC AUTO: 87 FL (ref 80–100)
MONOCYTES # BLD AUTO: 0.3 X10*3/UL (ref 0.1–1)
MONOCYTES NFR BLD AUTO: 10.1 %
NEUTROPHILS # BLD AUTO: 1.57 X10*3/UL (ref 1.2–7.7)
NEUTROPHILS NFR BLD AUTO: 52.6 %
NRBC BLD-RTO: 0 /100 WBCS (ref 0–0)
PLATELET # BLD AUTO: 84 X10*3/UL (ref 150–450)
RBC # BLD AUTO: 3.58 X10*6/UL (ref 4.5–5.9)
WBC # BLD AUTO: 3 X10*3/UL (ref 4.4–11.3)

## 2025-03-25 PROCEDURE — 2500000004 HC RX 250 GENERAL PHARMACY W/ HCPCS (ALT 636 FOR OP/ED): Performed by: STUDENT IN AN ORGANIZED HEALTH CARE EDUCATION/TRAINING PROGRAM

## 2025-03-25 PROCEDURE — 77001 FLUOROGUIDE FOR VEIN DEVICE: CPT | Performed by: RADIOLOGY

## 2025-03-25 PROCEDURE — 99152 MOD SED SAME PHYS/QHP 5/>YRS: CPT

## 2025-03-25 PROCEDURE — 7100000009 HC PHASE TWO TIME - INITIAL BASE CHARGE

## 2025-03-25 PROCEDURE — 85025 COMPLETE CBC W/AUTO DIFF WBC: CPT | Performed by: STUDENT IN AN ORGANIZED HEALTH CARE EDUCATION/TRAINING PROGRAM

## 2025-03-25 PROCEDURE — 2720000007 HC OR 272 NO HCPCS

## 2025-03-25 PROCEDURE — 2500000001 HC RX 250 WO HCPCS SELF ADMINISTERED DRUGS (ALT 637 FOR MEDICARE OP): Performed by: STUDENT IN AN ORGANIZED HEALTH CARE EDUCATION/TRAINING PROGRAM

## 2025-03-25 PROCEDURE — 99153 MOD SED SAME PHYS/QHP EA: CPT

## 2025-03-25 PROCEDURE — 2500000002 HC RX 250 W HCPCS SELF ADMINISTERED DRUGS (ALT 637 FOR MEDICARE OP, ALT 636 FOR OP/ED): Performed by: STUDENT IN AN ORGANIZED HEALTH CARE EDUCATION/TRAINING PROGRAM

## 2025-03-25 PROCEDURE — C1769 GUIDE WIRE: HCPCS

## 2025-03-25 PROCEDURE — 76937 US GUIDE VASCULAR ACCESS: CPT | Performed by: RADIOLOGY

## 2025-03-25 PROCEDURE — 36415 COLL VENOUS BLD VENIPUNCTURE: CPT | Performed by: STUDENT IN AN ORGANIZED HEALTH CARE EDUCATION/TRAINING PROGRAM

## 2025-03-25 PROCEDURE — 02HV33Z INSERTION OF INFUSION DEVICE INTO SUPERIOR VENA CAVA, PERCUTANEOUS APPROACH: ICD-10-PCS | Performed by: RADIOLOGY

## 2025-03-25 PROCEDURE — C1750 CATH, HEMODIALYSIS,LONG-TERM: HCPCS

## 2025-03-25 PROCEDURE — 2780000003 HC OR 278 NO HCPCS

## 2025-03-25 PROCEDURE — 99233 SBSQ HOSP IP/OBS HIGH 50: CPT | Performed by: INTERNAL MEDICINE

## 2025-03-25 PROCEDURE — 0JH63XZ INSERTION OF TUNNELED VASCULAR ACCESS DEVICE INTO CHEST SUBCUTANEOUS TISSUE AND FASCIA, PERCUTANEOUS APPROACH: ICD-10-PCS | Performed by: RADIOLOGY

## 2025-03-25 PROCEDURE — 8010000001 HC DIALYSIS - HEMODIALYSIS PER DAY

## 2025-03-25 PROCEDURE — 7100000010 HC PHASE TWO TIME - EACH INCREMENTAL 1 MINUTE

## 2025-03-25 PROCEDURE — 99232 SBSQ HOSP IP/OBS MODERATE 35: CPT | Performed by: STUDENT IN AN ORGANIZED HEALTH CARE EDUCATION/TRAINING PROGRAM

## 2025-03-25 PROCEDURE — 83735 ASSAY OF MAGNESIUM: CPT | Performed by: STUDENT IN AN ORGANIZED HEALTH CARE EDUCATION/TRAINING PROGRAM

## 2025-03-25 PROCEDURE — 82947 ASSAY GLUCOSE BLOOD QUANT: CPT

## 2025-03-25 PROCEDURE — 2500000004 HC RX 250 GENERAL PHARMACY W/ HCPCS (ALT 636 FOR OP/ED): Performed by: RADIOLOGY

## 2025-03-25 PROCEDURE — 1100000001 HC PRIVATE ROOM DAILY

## 2025-03-25 PROCEDURE — 36558 INSERT TUNNELED CV CATH: CPT | Performed by: RADIOLOGY

## 2025-03-25 RX ORDER — INSULIN LISPRO 100 [IU]/ML
2 INJECTION, SOLUTION INTRAVENOUS; SUBCUTANEOUS ONCE
Status: COMPLETED | OUTPATIENT
Start: 2025-03-25 | End: 2025-03-25

## 2025-03-25 RX ORDER — MIDAZOLAM HYDROCHLORIDE 1 MG/ML
INJECTION INTRAMUSCULAR; INTRAVENOUS
Status: COMPLETED | OUTPATIENT
Start: 2025-03-25 | End: 2025-03-25

## 2025-03-25 RX ORDER — FENTANYL CITRATE 50 UG/ML
INJECTION, SOLUTION INTRAMUSCULAR; INTRAVENOUS
Status: COMPLETED | OUTPATIENT
Start: 2025-03-25 | End: 2025-03-25

## 2025-03-25 RX ORDER — TRAMADOL HYDROCHLORIDE 50 MG/1
50 TABLET ORAL EVERY 12 HOURS PRN
Status: DISCONTINUED | OUTPATIENT
Start: 2025-03-25 | End: 2025-03-26

## 2025-03-25 RX ADMIN — TRAMADOL HYDROCHLORIDE 50 MG: 50 TABLET, COATED ORAL at 17:34

## 2025-03-25 RX ADMIN — MIDAZOLAM HYDROCHLORIDE 1 MG: 2 INJECTION, SOLUTION INTRAMUSCULAR; INTRAVENOUS at 08:49

## 2025-03-25 RX ADMIN — CLOTRIMAZOLE: 1 CREAM TOPICAL at 21:45

## 2025-03-25 RX ADMIN — PRAVASTATIN SODIUM 10 MG: 20 TABLET ORAL at 21:43

## 2025-03-25 RX ADMIN — CARVEDILOL 37.5 MG: 12.5 TABLET, FILM COATED ORAL at 21:43

## 2025-03-25 RX ADMIN — INSULIN LISPRO 2 UNITS: 100 INJECTION, SOLUTION INTRAVENOUS; SUBCUTANEOUS at 22:50

## 2025-03-25 RX ADMIN — NIFEDIPINE 60 MG: 60 TABLET, FILM COATED, EXTENDED RELEASE ORAL at 21:44

## 2025-03-25 RX ADMIN — FENTANYL CITRATE 50 MCG: 50 INJECTION, SOLUTION INTRAMUSCULAR; INTRAVENOUS at 08:49

## 2025-03-25 RX ADMIN — FLUOCINONIDE: 0.5 OINTMENT TOPICAL at 08:07

## 2025-03-25 RX ADMIN — ASCORBIC ACID, THIAMINE MONONITRATE,RIBOFLAVIN, NIACINAMIDE, PYRIDOXINE HYDROCHLORIDE, FOLIC ACID, CYANOCOBALAMIN, BIOTIN, CALCIUM PANTOTHENATE, 1 CAPSULE: 100; 1.5; 1.7; 20; 10; 1; 6000; 150000; 5 CAPSULE, LIQUID FILLED ORAL at 08:05

## 2025-03-25 RX ADMIN — FENTANYL CITRATE 50 MCG: 50 INJECTION, SOLUTION INTRAMUSCULAR; INTRAVENOUS at 09:01

## 2025-03-25 RX ADMIN — FLUOCINONIDE: 0.5 OINTMENT TOPICAL at 21:45

## 2025-03-25 RX ADMIN — ACETAMINOPHEN 975 MG: 325 TABLET, FILM COATED ORAL at 14:41

## 2025-03-25 RX ADMIN — CLOTRIMAZOLE: 1 CREAM TOPICAL at 08:07

## 2025-03-25 RX ADMIN — TACROLIMUS 0.5 MG: 0.5 CAPSULE ORAL at 06:11

## 2025-03-25 RX ADMIN — NIFEDIPINE 60 MG: 60 TABLET, FILM COATED, EXTENDED RELEASE ORAL at 08:05

## 2025-03-25 RX ADMIN — MIDAZOLAM HYDROCHLORIDE 1 MG: 2 INJECTION, SOLUTION INTRAMUSCULAR; INTRAVENOUS at 09:02

## 2025-03-25 RX ADMIN — PREDNISONE 5 MG: 5 TABLET ORAL at 08:05

## 2025-03-25 RX ADMIN — ISOSORBIDE MONONITRATE 60 MG: 30 TABLET, EXTENDED RELEASE ORAL at 08:05

## 2025-03-25 RX ADMIN — TACROLIMUS 0.5 MG: 0.5 CAPSULE ORAL at 17:34

## 2025-03-25 RX ADMIN — PANTOPRAZOLE SODIUM 40 MG: 40 TABLET, DELAYED RELEASE ORAL at 06:11

## 2025-03-25 RX ADMIN — INSULIN GLARGINE 3 UNITS: 100 INJECTION, SOLUTION SUBCUTANEOUS at 17:40

## 2025-03-25 ASSESSMENT — PAIN SCALES - GENERAL
PAINLEVEL_OUTOF10: 9
PAINLEVEL_OUTOF10: 0 - NO PAIN
PAINLEVEL_OUTOF10: 5 - MODERATE PAIN
PAINLEVEL_OUTOF10: 0 - NO PAIN

## 2025-03-25 ASSESSMENT — PAIN - FUNCTIONAL ASSESSMENT
PAIN_FUNCTIONAL_ASSESSMENT: 0-10
PAIN_FUNCTIONAL_ASSESSMENT: NO/DENIES PAIN

## 2025-03-25 ASSESSMENT — COGNITIVE AND FUNCTIONAL STATUS - GENERAL
MOBILITY SCORE: 23
DAILY ACTIVITIY SCORE: 24
CLIMB 3 TO 5 STEPS WITH RAILING: A LITTLE

## 2025-03-25 ASSESSMENT — PAIN DESCRIPTION - LOCATION: LOCATION: CHEST

## 2025-03-25 ASSESSMENT — PAIN DESCRIPTION - ORIENTATION: ORIENTATION: RIGHT

## 2025-03-25 ASSESSMENT — PAIN SCALES - PAIN ASSESSMENT IN ADVANCED DEMENTIA (PAINAD): BREATHING: NORMAL

## 2025-03-25 ASSESSMENT — PAIN SCALES - WONG BAKER: WONGBAKER_NUMERICALRESPONSE: HURTS WHOLE LOT

## 2025-03-25 NOTE — CARE PLAN
The clinical goals for the shift include Pt safety will mantain    Problem: Pain - Adult  Goal: Verbalizes/displays adequate comfort level or baseline comfort level  Outcome: Progressing     Problem: Safety - Adult  Goal: Free from fall injury  Outcome: Progressing     Problem: Discharge Planning  Goal: Discharge to home or other facility with appropriate resources  Outcome: Progressing     Problem: Fall/Injury  Goal: Be free from injury by end of the shift  Outcome: Progressing     Problem: Fall/Injury  Goal: Verbalize understanding of personal risk factors for fall in the hospital  Outcome: Progressing

## 2025-03-25 NOTE — NURSING NOTE
Report from Sending RN:    Report From: Eliza ( RN)  Recent Surgery of Procedure: No  Baseline Level of Consciousness (LOC): a/o x 4  Oxygen Use: No  Type: none  Diabetic: Yes, 155  Last BP Med Given Day of Dialysis: yes, see mar  Last Pain Med Given: none  Lab Tests to be Obtained with Dialysis: No  Blood Transfusion to be Given During Dialysis: No  Available IV Access: Yes  Medications to be Administered During Dialysis: No  Continuous IV Infusion Running: No  Restraints on Currently or in the Last 24 Hours: No  Hand-Off Communication: No acute morning events; vss; no labs needed; pt is able to stand and travels on cart; contact precautions initiated for rotavirus; Priscilla Moy RN.  Dialysis Catheter Dressing: right subclavian catheter  Last Dressing Change: will assess when pt arrives to the unit

## 2025-03-25 NOTE — POST-PROCEDURE NOTE
Interventional Radiology Brief Postprocedure Note    Attending: Dr. Porter    Diagnosis: ESRD    Description of procedure: Technically successful placement of a tunneled hemodialysis catheter. The tunneled dialysis catheter is ready to use.  It is charged with a dwell solution of heparin 1000 units/mL.      Anesthesia:  MAC Moderate    Complications: None    Estimated Blood Loss: minimal    Medications  As of 03/25/25 0933      morphine injection 4 mg (mg) Total dose:  4 mg      Date/Time Rate/Dose/Volume Action       03/18/25  1620 4 mg Given               iohexol (OMNIPaque) 350 mg iodine/mL solution 75 mL (mL) Total volume:  75 mL      Date/Time Rate/Dose/Volume Action       03/18/25  1511 75 mL Given               calcium gluconate 2 g in sodium chloride (iso)  mL (mL/hr) Total dose:  2 g*   *From user-documented volume     Date/Time Rate/Dose/Volume Action       03/18/25  1749 2 g - 100 mL/hr (over 60 min) - 100 mL New Bag      1850  (over 60 min) Stopped               calcium gluconate 2 g in sodium chloride (iso)  mL (mL/hr) Total dose:  2 g* Dosing weight:  65.8   *From user-documented volume     Date/Time Rate/Dose/Volume Action       03/19/25  0838 2 g - 600 mL/hr (over 10 min) New Bag      0905 100 mL Stopped               calcium gluconate in sodium chloride (iso) IV 2 gram/100 mL  - Omnicell Override Pull (mL/hr) Total volume:  Not documented*   *Total volume has not been documented. View each administration to see the amount administered.     Date/Time Rate/Dose/Volume Action       03/19/25  0838 2 g - 600 mL/hr (over 10 min) New Bag      0905  (over 10 min) Stopped               sodium bicarbonate injection 50 mEq (mEq) Total dose:  Cannot be calculated*   *Administration dose not documented     Date/Time Rate/Dose/Volume Action       03/18/25  Canceled Entry               sodium bicarbonate injection 50 mEq (mEq) Total dose:  50 mEq Dosing weight:  65.8      Date/Time Rate/Dose/Volume  Action       03/19/25  0906 50 mEq Given               sodium bicarbonate injection 1 mEq/mL (8.4 %)  - Omnicell Override Pull (mEq) Total dose:  50 mEq      Date/Time Rate/Dose/Volume Action       03/19/25  0906 50 mEq Given               dextrose 50 % injection 25 g (g) Total dose:  Cannot be calculated*   *Administration dose not documented     Date/Time Rate/Dose/Volume Action       03/18/25  Canceled Entry     03/19/25  0926 *Not included in total MAR Hold      1033 *Not included in total MAR Unhold     03/20/25  1637 *Not included in total MAR Hold      1755 *Not included in total MAR Unhold               dextrose 50 % injection 25 g (g) Total dose:  Cannot be calculated*   *Administration dose not documented     Date/Time Rate/Dose/Volume Action       03/19/25  Canceled Entry     03/20/25  1637 *Not included in total MAR Hold      1755 *Not included in total MAR Unhold               dextrose 50 % injection 12.5 g (g) Total dose:  Cannot be calculated*   *Administration dose not documented     Date/Time Rate/Dose/Volume Action       03/20/25  1637 *Not included in total MAR Hold      1755 *Not included in total MAR Unhold               dextrose 50 % injection 25 g (g) Total dose:  75 g Dosing weight:  65.8      Date/Time Rate/Dose/Volume Action       03/19/25  0840 25 g (over 5 min) Given      1051 25 g (over 5 min) Given      1757 25 g (over 5 min) Given               insulin regular (HumuLIN R,NovoLIN R) injection 5 Units (Units) Total dose:  Cannot be calculated*   *Administration dose not documented     Date/Time Rate/Dose/Volume Action       03/18/25  Canceled Entry     03/19/25  0926 *Not included in total MAR Hold      1033 *Not included in total MAR Unhold               insulin regular (HumuLIN R,NovoLIN R) injection 5 Units (Units) Total dose:  10 Units Dosing weight:  65.8      Date/Time Rate/Dose/Volume Action       03/19/25  0839 5 Units (over 1 min) Given      1050 5 Units (over 1 min) Given                insulin regular (HumuLIN R,NovoLIN R) injection 10 Units (Units) Total dose:  10 Units Dosing weight:  65.8      Date/Time Rate/Dose/Volume Action       03/19/25 1755 10 Units (over 1 min) Given               heparin (porcine) injection 5,000 Units (Units) Total dose:  80,000 Units*   *Administration not included in total     Date/Time Rate/Dose/Volume Action       03/18/25  2324 5,000 Units Given     03/19/25  0633 5,000 Units Given      1308 5,000 Units Given      2048 5,000 Units Given     03/20/25  0623 5,000 Units Given      1329 5,000 Units Given      1637 *Not included in total MAR Hold      1755 *Not included in total MAR Unhold      2235 5,000 Units Given     03/21/25  0640 5,000 Units Given      1348 5,000 Units Given     03/22/25  0109 5,000 Units Given      0646 5,000 Units Given      1404 *5,000 Units Missed      2250 5,000 Units Given     03/23/25  0654 5,000 Units Given      1423 5,000 Units Given      2309 5,000 Units Given     03/24/25  0601 5,000 Units Given               glucagon (Glucagen) injection 1 mg (mg) Total dose:  Cannot be calculated*   *Administration dose not documented     Date/Time Rate/Dose/Volume Action       03/20/25  1637 *Not included in total MAR Hold      1755 *Not included in total MAR Unhold               glucagon (Glucagen) injection 1 mg (mg) Total dose:  Cannot be calculated*   *Administration dose not documented     Date/Time Rate/Dose/Volume Action       03/20/25  1637 *Not included in total MAR Hold      1755 *Not included in total MAR Unhold               insulin lispro injection 0-5 Units (Units) Total dose:  22 Units      Date/Time Rate/Dose/Volume Action       03/19/25  0633 *Not included in total Missed      1052 2 Units Given      1550 2 Units Given     03/20/25  0622 *Not included in total Missed      1144 *Not included in total Missed      1543 1 Units Given      1637 *Not included in total MAR Hold      1755 *Not included in total MAR Unhold      03/21/25  0854 *Not included in total Missed      1156 *Not included in total Missed      1830 3 Units Given     03/22/25  0945 *Not included in total Missed      1403 *Not included in total Missed      1741 1 Units Given     03/23/25  0926 2 Units Given      1246 4 Units Given      1759 1 Units Given     03/24/25  0831 2 Units Given      1316 1 Units Given      1758 3 Units Given     03/25/25  0810 *Not included in total Missed               insulin lispro injection 4 Units (Units) Total dose:  4 Units Dosing weight:  69      Date/Time Rate/Dose/Volume Action       03/22/25  0359 4 Units Given               acetaminophen (Tylenol) tablet 975 mg (mg) Total dose:  1,950 mg*   *Administration not included in total     Date/Time Rate/Dose/Volume Action       03/19/25  2226 975 mg Given     03/20/25  1253 975 mg Given      1637 *Not included in total MAR Hold      1755 *Not included in total MAR Unhold     03/22/25  0945 *975 mg Missed               carvedilol (Coreg) tablet 37.5 mg (mg) Total dose:  37.5 mg*   *Administration not included in total     Date/Time Rate/Dose/Volume Action       03/18/25  2324 37.5 mg Given     03/19/25  0848 *Not included in total Held by provider      0900 *37.5 mg Missed      2100 *37.5 mg Missed     03/20/25  0900 *37.5 mg Missed      2100 *37.5 mg Missed     03/21/25  0411 *Not included in total Unheld by provider               carvedilol (Coreg) tablet 37.5 mg (mg) Total dose:  37.5 mg Dosing weight:  65.8      Date/Time Rate/Dose/Volume Action       03/21/25  0449 37.5 mg Given               carvedilol (Coreg) tablet 37.5 mg (mg) Total dose:  0 mg* Dosing weight:  65.8   *Administration not included in total     Date/Time Rate/Dose/Volume Action       03/22/25  0114 *37.5 mg Missed               carvedilol (Coreg) tablet 37.5 mg (mg) Total dose:  150 mg* Dosing weight:  69   *Administration not included in total     Date/Time Rate/Dose/Volume Action       03/22/25  0925 *37.5 mg  Missed      2246 37.5 mg Given     03/23/25  0932 *37.5 mg Missed      2007 37.5 mg Given     03/24/25  0830 37.5 mg Given      2152 37.5 mg Given     03/25/25  0805 *37.5 mg Missed               insulin glargine (Lantus) injection 3 Units (Units) Total dose:  18 Units*   *Administration not included in total     Date/Time Rate/Dose/Volume Action       03/18/25  2330 *3 Units Missed     03/19/25  1822 3 Units Given     03/20/25  1637 *Not included in total MAR Hold      1755 *Not included in total MAR Unhold      1825 3 Units Given     03/21/25  1830 3 Units Given     03/22/25  1741 3 Units Given     03/23/25  1759 3 Units Given     03/24/25  1758 3 Units Given               cinacalcet (Sensipar) tablet 30 mg (mg) Total dose:  Cannot be calculated*   *Administration dose not documented     Date/Time Rate/Dose/Volume Action       03/18/25  1924 *Not included in total Held by provider      2331 *30 mg Missed     03/19/25  0900 *30 mg Missed     03/20/25  0900 *30 mg Missed     03/21/25  0900 *30 mg Missed     03/22/25  0900 *30 mg Missed     03/23/25  0900 *30 mg Missed     03/24/25  0900 *30 mg Missed     03/25/25  0900 *30 mg Missed               isosorbide mononitrate ER (Imdur) 24 hr tablet 60 mg (mg) Total dose:  360 mg*   *Administration not included in total     Date/Time Rate/Dose/Volume Action       03/18/25  2324 60 mg Given     03/19/25  1044 *60 mg Missed     03/20/25  0816 60 mg Given      1637 *Not included in total MAR Hold      1755 *Not included in total MAR Unhold     03/21/25  0903 60 mg Given     03/22/25  0926 *60 mg Missed     03/23/25  0925 60 mg Given     03/24/25  0830 60 mg Given     03/25/25  0805 60 mg Given               NIFEdipine ER (Adalat CC) 24 hr tablet 90 mg (mg) Total dose:  90 mg      Date/Time Rate/Dose/Volume Action       03/18/25  2324 90 mg Given               pantoprazole (ProtoNix) EC tablet 40 mg (mg) Total dose:  240 mg*   *Administration not included in total      Date/Time Rate/Dose/Volume Action       03/19/25  0633 40 mg Given     03/20/25  0622 40 mg Given      1637 *Not included in total MAR Hold      1755 *Not included in total MAR Unhold     03/21/25  0637 40 mg Given     03/22/25  0645 40 mg Given     03/23/25  0925 40 mg Given     03/24/25  0837 *40 mg Missed     03/25/25  0611 40 mg Given               pravastatin (Pravachol) tablet 10 mg (mg) Total dose:  60 mg*   *Administration not included in total     Date/Time Rate/Dose/Volume Action       03/19/25  0217 *10 mg Missed      2048 10 mg Given     03/20/25  1637 *Not included in total MAR Hold      1755 *Not included in total MAR Unhold      2235 10 mg Given     03/21/25  2121 10 mg Given     03/22/25  2245 10 mg Given     03/23/25 2005 10 mg Given     03/24/25 2152 10 mg Given               predniSONE (Deltasone) tablet 5 mg (mg) Total dose:  35 mg*   *Administration not included in total     Date/Time Rate/Dose/Volume Action       03/18/25  2324 5 mg Given     03/19/25  1044 *5 mg Missed     03/20/25  0816 5 mg Given     03/21/25  0903 5 mg Given     03/22/25  0938 5 mg Given     03/23/25  0925 5 mg Given     03/24/25  0830 5 mg Given     03/25/25  0805 5 mg Given               sevelamer carbonate (Renvela) tablet 800 mg (mg) Total dose:  Cannot be calculated*   *Administration dose not documented     Date/Time Rate/Dose/Volume Action       03/18/25  1924 *Not included in total Held by provider     03/19/25  0700 *800 mg Missed      1100 *800 mg Missed      1600 *800 mg Missed     03/20/25  0700 *800 mg Missed      1100 *800 mg Missed      1600 *800 mg Missed     03/21/25  0700 *800 mg Missed      1100 *800 mg Missed      1600 *800 mg Missed     03/22/25  0700 *800 mg Missed      1100 *800 mg Missed      1600 *800 mg Missed     03/23/25  0700 *800 mg Missed      1100 *800 mg Missed      1600 *800 mg Missed     03/24/25  0700 *800 mg Missed      1100 *800 mg Missed      1600 *800 mg Missed     03/25/25  0700  *800 mg Missed               tacrolimus (Prograf) capsule 0.5 mg (mg) Total dose:  7 mg      Date/Time Rate/Dose/Volume Action       03/18/25  2324 0.5 mg Given     03/19/25  0633 0.5 mg Given      1745 0.5 mg Given     03/20/25  0622 0.5 mg Given      1825 0.5 mg Given     03/21/25  0640 0.5 mg Given      1830 0.5 mg Given     03/22/25  0645 0.5 mg Given      1741 0.5 mg Given     03/23/25  0654 0.5 mg Given      1759 0.5 mg Given     03/24/25  0601 0.5 mg Given      1758 0.5 mg Given     03/25/25  0611 0.5 mg Given               vitamin B complex-vitamin C-folic acid (Nephrocaps) capsule 1 capsule (capsule) Total dose:  7 capsule*   *Administration not included in total     Date/Time Rate/Dose/Volume Action       03/18/25  2324 1 capsule Given     03/19/25  1045 *1 capsule Missed     03/20/25  0927 1 capsule Given      1637 *Not included in total MAR Hold      1755 *Not included in total MAR Unhold     03/21/25  0903 1 capsule Given     03/22/25  0938 1 capsule Given     03/23/25  0925 1 capsule Given     03/24/25  0830 1 capsule Given     03/25/25  0805 1 capsule Given               clotrimazole (Lotrimin) 1 % cream Total dose:  Cannot be calculated*   *Administration does not have dose documented     Date/Time Rate/Dose/Volume Action       03/19/25  0217  Given      1343  Given      2049  Given     03/20/25  0816  Given      1637 *Not included in total MAR Hold      1755 *Not included in total MAR Unhold      2237  Given     03/21/25  0902  Given     03/22/25  0119 *Not included in total Missed      0938  Given      2249  Given     03/23/25  0928  Given      2008  Given     03/24/25  0830  Given      2156  Given     03/25/25  0807  Given               fluocinonide (Lidex) 0.05 % ointment Total dose:  Cannot be calculated*   *Administration does not have dose documented     Date/Time Rate/Dose/Volume Action       03/19/25  0217  Given      1343  Given      2049  Given     03/20/25  0816  Given      1637 *Not  included in total MAR Hold      1755 *Not included in total MAR Unhold      2237  Given     03/21/25  0902  Given     03/22/25  0119 *Not included in total Missed      0938  Given      2249  Given     03/23/25  0928  Given      2008  Given     03/24/25  0830  Given      2156  Given     03/25/25  0807  Given               imiquimod (Aldara) 5 % cream 1 packet (packet) Total dose:  2 packet*   *Administration not included in total     Date/Time Rate/Dose/Volume Action       03/19/25  1343 1 packet Given     03/20/25  1637 *Not included in total MAR Hold      1755 *Not included in total MAR Unhold     03/21/25  1301 *1 packet Missed     03/24/25  0830 1 packet Given               NIFEdipine ER (Adalat CC) 24 hr tablet 60 mg (mg) Total dose:  480 mg*   *Administration not included in total     Date/Time Rate/Dose/Volume Action       03/19/25  0848 *Not included in total Held by provider      0900 *60 mg Missed      2100 *60 mg Missed     03/20/25  0900 *60 mg Missed      2100 *60 mg Missed     03/21/25  0411 *Not included in total Unheld by provider      0903 60 mg Given     03/22/25  0110 60 mg Given      0926 *60 mg Missed      2246 60 mg Given     03/23/25  0931 60 mg Given      2006 60 mg Given     03/24/25  0830 60 mg Given      2152 60 mg Given     03/25/25  0805 60 mg Given               sodium chloride 0.9 % bolus 500 mL (mL) Total dose:  Cannot be calculated* Dosing weight:  65.8   *Administration dose not documented     Date/Time Rate/Dose/Volume Action       03/19/25  0926 *Not included in total MAR Hold      1033 *Not included in total MAR Unhold      Canceled Entry               sodium chloride 0.9 % bolus 1,000 mL (mL) Total dose:  Cannot be calculated* Dosing weight:  65.8   *Administration dose not documented     Date/Time Rate/Dose/Volume Action       03/19/25  0926 *Not included in total MAR Hold      1033 *Not included in total MAR Unhold      Canceled Entry               sodium chloride 0.9 % bolus  1,000 mL (mL/hr) Total volume:  2,000 mL* Dosing weight:  65.8   *From user-documented volume     Date/Time Rate/Dose/Volume Action       03/19/25  0848 1,000 mL - 2,000 mL/hr (over 30 min) New Bag      1027 1,000 mL Stopped     03/20/25  1144 1,000 mL - 999 mL/hr (over 60 min) New Bag      1253 1,000 mL Stopped               sodium zirconium cyclosilicate (Lokelma) packet 10 g (g) Total dose:  20 g* Dosing weight:  65.8   *Administration not included in total     Date/Time Rate/Dose/Volume Action       03/19/25  0926 *Not included in total MAR Hold      1033 *Not included in total MAR Unhold      1044 *10 g Missed      1548 10 g Given     03/20/25  0315 *10 g Missed      0758 *10 g Missed      1541 *10 g Missed      1637 *Not included in total MAR Hold     03/21/25  0019 *Not included in total MAR Unhold      0421 10 g Given               sodium zirconium cyclosilicate (Lokelma) packet 10 g (g) Total dose:  20 g Dosing weight:  69      Date/Time Rate/Dose/Volume Action       03/21/25  1348 10 g Given     03/22/25  0938 10 g Given               albumin human 5 % infusion 25 g (g) Total dose:  Cannot be calculated* Dosing weight:  65.8   *Administration dose not documented     Date/Time Rate/Dose/Volume Action       03/19/25  0926 *Not included in total MAR Hold      Canceled Entry      Canceled Entry      1553 *Not included in total MAR Unhold               famotidine PF (Pepcid) injection 20 mg (mg) Total dose:  Cannot be calculated* Dosing weight:  65.8   *Administration dose not documented     Date/Time Rate/Dose/Volume Action       03/19/25  0926 *Not included in total MAR Hold      1033 *Not included in total MAR Unhold      1344 *20 mg (over 2 min) Missed     03/20/25  1637 *Not included in total MAR Hold      1755 *Not included in total MAR Unhold               dextrose 10 % in water (D10W) infusion (mL/hr) Total volume:  Not documented* Dosing weight:  65.8   *Total volume has not been documented. View each  administration to see the amount administered.     Date/Time Rate/Dose/Volume Action       03/19/25  1106 *50 mL/hr Missed      1800 *50 mL/hr Missed               ondansetron (Zofran) injection 4 mg (mg) Total dose:  4 mg Dosing weight:  65.8      Date/Time Rate/Dose/Volume Action       03/19/25  1342 4 mg Given               ondansetron (Zofran) injection 4 mg/2 mL  - Omnicell Override Pull (mg) Total dose:  4 mg      Date/Time Rate/Dose/Volume Action       03/19/25  1342 4 mg Given               trimethobenzamide (Tigan) injection 200 mg (mg) Total dose:  200 mg Dosing weight:  65.8      Date/Time Rate/Dose/Volume Action       03/20/25  1202 200 mg Given      1637 *Not included in total MAR Hold      1755 *Not included in total MAR Unhold               oxyCODONE (Roxicodone) immediate release tablet 5 mg (mg) Total dose:  5 mg Dosing weight:  65.8      Date/Time Rate/Dose/Volume Action       03/20/25  0056 5 mg Given               oxyCODONE (Roxicodone) immediate release tablet 5 mg  - Omnicell Override Pull (mg) Total dose:  5 mg      Date/Time Rate/Dose/Volume Action       03/20/25  0056 5 mg Given               fentaNYL PF (Sublimaze) injection (mcg) Total dose:  100 mcg      Date/Time Rate/Dose/Volume Action       03/25/25  0849 50 mcg Given      0901 50 mcg Given               midazolam (Versed) injection (mg) Total dose:  2 mg      Date/Time Rate/Dose/Volume Action       03/25/25  0849 1 mg Given      0902 1 mg Given                   No specimens collected      See detailed result report with images in PACS.    The patient tolerated the procedure well without incident or complication and is in stable condition.     Farooq Cohen DO, PGY-3   Diagnostic Radiology   [facility]

## 2025-03-25 NOTE — PRE-PROCEDURE NOTE
Interventional Radiology Preprocedure Note    Procedure: IR Tunneled hemodialysis line placement     Indication: ESRD. LUE AVF not working.     Indication for procedure: The primary encounter diagnosis was Hyperkalemia. Diagnoses of Diarrhea, unspecified type, Dialysis complication, subsequent encounter, Arteriovenous fistula stenosis, initial encounter (CMS-[facility]), and Other specified complications of surgical and medical care, not elsewhere classified, sequela were also pertinent to this visit.    Relevant review of systems: NA    Relevant Labs:   Lab Results   Component Value Date    CREATININE 7.46 (H) 03/23/2025    EGFR 7 (L) 03/23/2025    INR 1.3 (H) 09/11/2024    PROTIME 15.0 (H) 09/11/2024       Planned Sedation/Anesthesia: Moderate    Airway assessment: normal    Directed physical examination:    A&Ox3   Normal Respirations   LUE AVF    Mallampati: III (soft and hard palate and base of uvula visible)    ASA Score: ASA 3 - Patient with moderate systemic disease with functional limitations    Benefits, risks and alternatives of procedure and planned sedation have been discussed with the patient and/or their representative. All questions answered and they agree to proceed.     Farooq Cohen DO, PGY-3   Diagnostic Radiology   [facility]

## 2025-03-25 NOTE — CARE PLAN
Problem: Safety - Adult  Goal: Free from fall injury  Outcome: Progressing     Problem: Chronic Conditions and Co-morbidities  Goal: Patient's chronic conditions and co-morbidity symptoms are monitored and maintained or improved  Outcome: Progressing     Problem: Fall/Injury  Goal: Not fall by end of shift  Outcome: Progressing  Goal: Be free from injury by end of the shift  Outcome: Progressing  Goal: Verbalize understanding of personal risk factors for fall in the hospital  Outcome: Progressing  Goal: Verbalize understanding of risk factor reduction measures to prevent injury from fall in the home  Outcome: Progressing   The patient's goals for the shift include      The clinical goals for the shift include patient will remain safe, free from injury or fall

## 2025-03-25 NOTE — PROGRESS NOTES
Transplant Nephrology progress note     Date of admission: 3/18/2025     Manolo Bashir is a 69 y.o.  with PMH   Past Medical History:   Diagnosis Date    Anemia     Arthritis     Cataract     Chronic kidney disease, stage 3 unspecified (Multi) 09/26/2018    Stage 3 chronic kidney disease    CKD (chronic kidney disease)     stage V    Cough 02/12/2024    COVID-19 06/18/2020    COVID-19 virus infection    Diabetes (Multi)     ESRD (end stage renal disease) (Multi)     Focal and segmental proliferative glomerulonephritis 12/23/2023    HTN (hypertension)     Hyperlipidemia     Other long term (current) drug therapy 07/20/2021    High risk medication use    Personal history of other diseases of the circulatory system     Personal history of cardiac murmur    Personal history of other infectious and parasitic diseases 08/17/2015    History of hepatitis    Polyp, colonic 08/17/2023    Primary osteoarthritis of both ankles 08/17/2023    Prostate cancer (Multi)     Tubular adenoma of colon 08/17/2023    Unspecified kidney failure 08/17/2016    Renal failure        SUBJECTIVE:  Denied any complaints this morning.  Waiting for fistulogram today.      ALLERGIES:  No Known Allergies         CURRENT MEDICATIONS:  Scheduled medications  carvedilol, 37.5 mg, oral, BID  [Held by provider] cinacalcet, 30 mg, oral, Daily  clotrimazole, , Topical, BID  dextrose, 25 g, intravenous, Once  fluocinonide, , Topical, BID  imiquimod, 1 packet, Topical, Once per day on Monday Wednesday Friday  insulin glargine, 3 Units, subcutaneous, q24h  insulin lispro, 0-5 Units, subcutaneous, TID AC  isosorbide mononitrate ER, 60 mg, oral, Daily  NIFEdipine ER, 60 mg, oral, BID  pantoprazole, 40 mg, oral, Daily before breakfast  pravastatin, 10 mg, oral, Nightly  predniSONE, 5 mg, oral, Daily  [Held by provider] sevelamer carbonate, 800 mg, oral, TID AC  tacrolimus, 0.5 mg, oral, BID  vitamin B complex-vitamin C-folic acid, 1 capsule, oral,  "Daily      Continuous medications       PRN medications  PRN medications: acetaminophen, dextrose, dextrose, glucagon, glucagon, trimethobenzamide       OBJECTIVE:    VITALS: Visit Vitals  /53   Pulse 55   Temp 36.3 °C (97.3 °F)   Resp 17   Ht 1.803 m (5' 11\")   Wt 69 kg (152 lb 1.9 oz)   SpO2 100%   BMI 21.22 kg/m²   Smoking Status Never   BSA 1.86 m²        General: No distress   CVS: S1 S2 no murmurs  RESP:  Lungs clear to auscultation   ABDO: Soft, non-tender   Neuro: A + O x 3  Skin: No rash   Extremities: No edema  Access: LUE AVF       LABS:  Results from last 72 hours   Lab Units 03/24/25  0457 03/23/25  0650   WBC AUTO x10*3/uL 2.5* 2.7*   HEMOGLOBIN g/dL 9.5* 10.0*   MCV fL 84 83   PLATELETS AUTO x10*3/uL 63* 80*   BUN mg/dL  --  15   CREATININE mg/dL  --  7.46*   CALCIUM mg/dL  --  8.3*   TACROLIMUS ng/mL 2.3 2.9          No intake or output data in the 24 hours ending 03/24/25 2129         ASSESSMENT AND PLAN:  Manolo Bashir is a 69 y.o. male presenting with LLQ allograft tenderness and gross hematuria.  He has ESKD attributed to hypertension since 1998 s/p kidney transplant #1 3/26/1992 that failed 11/13/2006), s/p kidney transplant #2 7/13/2013 which failed in May 2024 following which he has been on HD (TTS, [facility] via LUE AVG) . He also has known MGUS with IgG and IgA lambda (bone marrow bx 10/2023 with no plasma cell dyscrasia), DM2, HTN, prostate cancer s/p radical prostatectomy, baseline urinary incontinence, HCV s/p treatment (recent HCV PCR negative 7/2024).  Currently presented with a missed dialysis in the setting of diarrhea      #ESRD TTS   -HD at [facility], nephrologist Dr. Bridges, DW 69kg  -cont HD TTS.. No acute indication today.  -Awaiting on IR fistulogram for fistula stenosis. TDC placement requested as well.    #Diarrhea  -CMV, C diff negative  -Rotavirus positive -improving     # Immunosuppression-Tac 0.5mg BID, Pred 5mg daily  #Graft intolerance/chronic rejection - " transplant nephrectomy outpatient planned    #Hypertension -> blood pressures are optimal, continue with the nifedipine, Imdur, carvedilol.   #CKD-MBD -calcium and phosphorus levels are optimal.      Raad Rolle MD

## 2025-03-25 NOTE — NURSING NOTE
Report to Receiving RN:    Report To: AYO Kay  Time Report Called: 1600  Hand-Off Communication: pt stable, completed and tolerated HD tx, post vitals: bp 122/69, pulse 55, pt removed 1 liter  Complications During Treatment: No  Ultrafiltration Treatment: Yes  Medications Administered During Dialysis: No  Blood Products Administered During Dialysis: No  Labs Sent During Dialysis: No  Heparin Drip Rate Changes: No  Dialysis Catheter Dressing: yes  Last Dressing Change: 3/25/2025      Last Updated: 4:03 PM by DEIRDRE NICOLE

## 2025-03-25 NOTE — PROGRESS NOTES
INTERNAL MEDICINE PROGRESS NOTE     BRIEF NARRATIVE      Manolo Bashir is a 69 y.o. male on day 6 of admission presenting with Hyperkalemia.    Pt with ESRD from hypertension s/p 2 kidney transplants (1998 and 2013) that failed in may 2024 now on HD (T/TH/S) through LUE AVG), MGUS, T2DM, HTN, prostate cancer s/p prostatectomy, urine incontinence who presented with CP, labs concerning for leukopenia, thrombocytopenia, however patient afebrile some infective process going on in the setting of his immunosuppression.  Patient's chest pain unlikely ACS as EKG and tropes negative etiology unclear possible angina as patient's pain was relieved with nitroglycerin and brought on by exertion     ASSESSMENT / PLANS      Hypovolemic shock - resolved  Hypotension due to Hypovolemic shock  Likely d/t due to acute diarrhea.  Continue to monitor  resume home meds     Diarrhea due to Rotavirus  Still on isolation.  Continue to monitor  Per patient he is now having regular/formed BM        ESRD on HD hx of renal transplant and immunosuppression  AV Fistula stenosis  Appreciate IR recommendation  Patient stable for discharge, but will await for possible fistulogram and TDC placement tentatively today   Dialysis per nephro  Continue immunosuppression meds.   Tacrolimus levels are normal so far     Hx of Hypertension:   Good control  Continue current meds     Acute Diarrheal Illness:    resolved     Elevated Troponin:   Suspects demand ischemia from hypovolemic shock in ESRD patient.  Continue to monitor  No chest pain.   Trop trending down.     SUBJECTIVE       Pt seen and examined today, NAD     OBJECTIVE      Visit Vitals  /66   Pulse 57   Temp  36.3 °C (97.3 °F)   Resp 19      No intake or output data in the 24 hours ending 03/25/25 0811    Physical Exam   Constitutional:       Appearance: Normal appearance.   HENT:      Head: Normocephalic and atraumatic.      Nose: Nose normal.      Mouth/Throat:      Mouth: Mucous membranes are dry.   Eyes:      Extraocular Movements: Extraocular movements intact.      Pupils: Pupils are equal, round, and reactive to light.   Cardiovascular:      Rate and Rhythm: Normal rate and regular rhythm.      Pulses: Normal pulses.      Heart sounds: Normal heart sounds.   Pulmonary:      Effort: Pulmonary effort is normal.      Breath sounds: Normal breath sounds.   Abdominal:      General: Bowel sounds are normal.      Palpations: Abdomen is soft.   Musculoskeletal:         General: Normal range of motion.      Cervical back: Normal range of motion and neck supple.   Neurological:      General: No focal deficit present.      Mental Status: He is alert and oriented to person, place, and time.   Psychiatric:         Mood and Affect: Mood normal.         Behavior: Behavior normal.      Current Meds   carvedilol, 37.5 mg, oral, BID  [Held by provider] cinacalcet, 30 mg, oral, Daily  clotrimazole, , Topical, BID  dextrose, 25 g, intravenous, Once  fluocinonide, , Topical, BID  imiquimod, 1 packet, Topical, Once per day on Monday Wednesday Friday  insulin glargine, 3 Units, subcutaneous, q24h  insulin lispro, 0-5 Units, subcutaneous, TID AC  isosorbide mononitrate ER, 60 mg, oral, Daily  NIFEdipine ER, 60 mg, oral, BID  pantoprazole, 40 mg, oral, Daily before breakfast  pravastatin, 10 mg, oral, Nightly  predniSONE, 5 mg, oral, Daily  [Held by provider] sevelamer carbonate, 800 mg, oral, TID AC  tacrolimus, 0.5 mg, oral, BID  vitamin B complex-vitamin C-folic acid, 1 capsule, oral, Daily       PRN medications: acetaminophen, dextrose, dextrose, glucagon, glucagon, trimethobenzamide     LABS and IMAGING     WBC   Date Value Ref Range  Status   03/24/2025 2.5 (L) 4.4 - 11.3 x10*3/uL Final   03/23/2025 2.7 (L) 4.4 - 11.3 x10*3/uL Final     Hemoglobin   Date Value Ref Range Status   03/24/2025 9.5 (L) 13.5 - 17.5 g/dL Final   03/23/2025 10.0 (L) 13.5 - 17.5 g/dL Final     Hematocrit   Date Value Ref Range Status   03/24/2025 29.1 (L) 41.0 - 52.0 % Final   03/23/2025 30.3 (L) 41.0 - 52.0 % Final     Bicarbonate   Date Value Ref Range Status   03/23/2025 32 21 - 32 mmol/L Final     Creatinine   Date Value Ref Range Status   03/23/2025 7.46 (H) 0.50 - 1.30 mg/dL Final     Calcium   Date Value Ref Range Status   03/23/2025 8.3 (L) 8.6 - 10.6 mg/dL Final       ECG 12 lead  Normal sinus rhythm  Nonspecific intraventricular block  Cannot rule out Anterior infarct (cited on or before 19-MAR-2025)  T wave abnormality, consider lateral ischemia  Abnormal ECG  When compared with ECG of 19-MAR-2025 09:48,  Serial changes of Anterior infarct Present  ECG 12 Lead  Normal sinus rhythm  Left axis deviation  Nonspecific intraventricular block  Abnormal ECG  ECG 12 Lead  Normal sinus rhythm with sinus arrhythmia  Left ventricular hypertrophy with QRS widening  Abnormal ECG  When compared with ECG of 20-MAR-2025 08:22,  Premature atrial complexes are no longer Present  ECG 12 lead  Normal sinus rhythm  Left axis deviation  Nonspecific intraventricular block  Cannot rule out Septal infarct , age undetermined  Abnormal ECG  When compared with ECG of 19-MAR-2025 09:10,  Previous ECG has undetermined rhythm, needs review  Nonspecific intraventricular block has replaced Left bundle branch block  Minimal criteria for Septal infarct are now Present  T wave inversion now evident in Inferior leads         PROBLEM LISTS      Problem List Items Addressed This Visit          Genitourinary and Reproductive    * (Principal) Hyperkalemia - Primary     Other Visit Diagnoses       Diarrhea, unspecified type        Dialysis complication, subsequent encounter        Relevant Orders     Vascular US upper extremity hemodialysis access duplex left (Completed)    Arteriovenous fistula stenosis, initial encounter (CMS-[facility])        Relevant Orders    Vascular US upper extremity hemodialysis access duplex left (Completed)    Other specified complications of surgical and medical care, not elsewhere classified, sequela                This dictation was created with voice recognition software. Although every effort is made to review the dictation as it is transcribed, on occasion spoken words, phrases, names, numbers, punctuation etc can be misinterpreted by the the technology leading to omissions or inappropriate outcomes.     Germania Jesus MD

## 2025-03-26 ENCOUNTER — DOCUMENTATION (OUTPATIENT)
Dept: INPATIENT UNIT | Facility: HOSPITAL | Age: 70
End: 2025-03-26
Payer: MEDICARE

## 2025-03-26 ENCOUNTER — APPOINTMENT (OUTPATIENT)
Dept: GASTROENTEROLOGY | Facility: HOSPITAL | Age: 70
End: 2025-03-26
Payer: MEDICARE

## 2025-03-26 VITALS
RESPIRATION RATE: 18 BRPM | OXYGEN SATURATION: 100 % | TEMPERATURE: 98.4 F | WEIGHT: 161.38 LBS | HEART RATE: 58 BPM | BODY MASS INDEX: 22.59 KG/M2 | DIASTOLIC BLOOD PRESSURE: 66 MMHG | HEIGHT: 71 IN | SYSTOLIC BLOOD PRESSURE: 135 MMHG

## 2025-03-26 LAB
BASOPHILS # BLD AUTO: 0.02 X10*3/UL (ref 0–0.1)
BASOPHILS NFR BLD AUTO: 0.6 %
EOSINOPHIL # BLD AUTO: 0.14 X10*3/UL (ref 0–0.7)
EOSINOPHIL NFR BLD AUTO: 4.2 %
ERYTHROCYTE [DISTWIDTH] IN BLOOD BY AUTOMATED COUNT: 15.9 % (ref 11.5–14.5)
GLUCOSE BLD MANUAL STRIP-MCNC: 207 MG/DL (ref 74–99)
GLUCOSE BLD MANUAL STRIP-MCNC: 209 MG/DL (ref 74–99)
HCT VFR BLD AUTO: 30 % (ref 41–52)
HGB BLD-MCNC: 9.6 G/DL (ref 13.5–17.5)
IMM GRANULOCYTES # BLD AUTO: 0.03 X10*3/UL (ref 0–0.7)
IMM GRANULOCYTES NFR BLD AUTO: 0.9 % (ref 0–0.9)
LYMPHOCYTES # BLD AUTO: 0.58 X10*3/UL (ref 1.2–4.8)
LYMPHOCYTES NFR BLD AUTO: 17.5 %
MAGNESIUM SERPL-MCNC: 1.96 MG/DL (ref 1.6–2.4)
MCH RBC QN AUTO: 27.5 PG (ref 26–34)
MCHC RBC AUTO-ENTMCNC: 32 G/DL (ref 32–36)
MCV RBC AUTO: 86 FL (ref 80–100)
MONOCYTES # BLD AUTO: 0.39 X10*3/UL (ref 0.1–1)
MONOCYTES NFR BLD AUTO: 11.8 %
NEUTROPHILS # BLD AUTO: 2.15 X10*3/UL (ref 1.2–7.7)
NEUTROPHILS NFR BLD AUTO: 65 %
NRBC BLD-RTO: 0 /100 WBCS (ref 0–0)
O+P STL MICRO: NEGATIVE
PLATELET # BLD AUTO: 95 X10*3/UL (ref 150–450)
RBC # BLD AUTO: 3.49 X10*6/UL (ref 4.5–5.9)
WBC # BLD AUTO: 3.3 X10*3/UL (ref 4.4–11.3)

## 2025-03-26 PROCEDURE — 2500000001 HC RX 250 WO HCPCS SELF ADMINISTERED DRUGS (ALT 637 FOR MEDICARE OP): Performed by: STUDENT IN AN ORGANIZED HEALTH CARE EDUCATION/TRAINING PROGRAM

## 2025-03-26 PROCEDURE — 83735 ASSAY OF MAGNESIUM: CPT | Performed by: STUDENT IN AN ORGANIZED HEALTH CARE EDUCATION/TRAINING PROGRAM

## 2025-03-26 PROCEDURE — 99239 HOSP IP/OBS DSCHRG MGMT >30: CPT | Performed by: STUDENT IN AN ORGANIZED HEALTH CARE EDUCATION/TRAINING PROGRAM

## 2025-03-26 PROCEDURE — 36415 COLL VENOUS BLD VENIPUNCTURE: CPT | Performed by: STUDENT IN AN ORGANIZED HEALTH CARE EDUCATION/TRAINING PROGRAM

## 2025-03-26 PROCEDURE — 85025 COMPLETE CBC W/AUTO DIFF WBC: CPT | Performed by: STUDENT IN AN ORGANIZED HEALTH CARE EDUCATION/TRAINING PROGRAM

## 2025-03-26 PROCEDURE — 2500000004 HC RX 250 GENERAL PHARMACY W/ HCPCS (ALT 636 FOR OP/ED): Performed by: STUDENT IN AN ORGANIZED HEALTH CARE EDUCATION/TRAINING PROGRAM

## 2025-03-26 PROCEDURE — 82947 ASSAY GLUCOSE BLOOD QUANT: CPT

## 2025-03-26 RX ORDER — HYDRALAZINE HYDROCHLORIDE 25 MG/1
25 TABLET, FILM COATED ORAL 3 TIMES DAILY PRN
Status: DISCONTINUED | OUTPATIENT
Start: 2025-03-26 | End: 2025-03-26 | Stop reason: HOSPADM

## 2025-03-26 RX ORDER — TRAMADOL HYDROCHLORIDE 50 MG/1
25 TABLET ORAL EVERY 12 HOURS PRN
Status: DISCONTINUED | OUTPATIENT
Start: 2025-03-26 | End: 2025-03-26 | Stop reason: HOSPADM

## 2025-03-26 RX ORDER — CYCLOBENZAPRINE HCL 10 MG
5 TABLET ORAL 3 TIMES DAILY PRN
Status: DISCONTINUED | OUTPATIENT
Start: 2025-03-26 | End: 2025-03-26 | Stop reason: HOSPADM

## 2025-03-26 RX ADMIN — CYCLOBENZAPRINE 5 MG: 10 TABLET, FILM COATED ORAL at 02:36

## 2025-03-26 RX ADMIN — TACROLIMUS 0.5 MG: 0.5 CAPSULE ORAL at 06:02

## 2025-03-26 RX ADMIN — PANTOPRAZOLE SODIUM 40 MG: 40 TABLET, DELAYED RELEASE ORAL at 06:02

## 2025-03-26 RX ADMIN — ACETAMINOPHEN 975 MG: 325 TABLET, FILM COATED ORAL at 01:02

## 2025-03-26 RX ADMIN — HYDRALAZINE HYDROCHLORIDE 25 MG: 25 TABLET ORAL at 06:02

## 2025-03-26 ASSESSMENT — PAIN SCALES - GENERAL
PAINLEVEL_OUTOF10: 9
PAINLEVEL_OUTOF10: 5 - MODERATE PAIN

## 2025-03-26 ASSESSMENT — PAIN SCALES - PAIN ASSESSMENT IN ADVANCED DEMENTIA (PAINAD)
TOTALSCORE: MEDICATION (SEE MAR)
BREATHING: NORMAL

## 2025-03-26 ASSESSMENT — PAIN DESCRIPTION - LOCATION: LOCATION: CHEST

## 2025-03-26 ASSESSMENT — PAIN DESCRIPTION - ORIENTATION: ORIENTATION: RIGHT

## 2025-03-26 NOTE — NURSING NOTE
Discharge AVS reviewed with pt who denies any questions/concerns at this time. IV removed by bedside RN. Pt not receiving any new scripts and states he has all belongings. Pt stated his wife is on the way to pick him up and transport was requested to take pt down to front door to meet ride.

## 2025-03-26 NOTE — NURSING NOTE
Patients blood pressure has been elevated overnight, double checked and manually checked, results are still elevated. Night provider updated regularly of increase in BP. PRN hydralazine ordered and was given. Explained to patient importance of closely monitoring and taking of blood pressure. Patient denies any chest pain/chest heaviness, no neurologic symptoms overnight, he denies dizziness or lightheadedness. In the morning, during report and initial rounds with day shift RN, patient is calm and comfortable in bed. Awake and alert. No evident signs of distress. Patient remained safe, free from injury or fall.

## 2025-03-26 NOTE — CARE PLAN
Problem: Pain - Adult  Goal: Verbalizes/displays adequate comfort level or baseline comfort level  Outcome: Progressing     Problem: Safety - Adult  Goal: Free from fall injury  Outcome: Progressing     Problem: Chronic Conditions and Co-morbidities  Goal: Patient's chronic conditions and co-morbidity symptoms are monitored and maintained or improved  Outcome: Progressing     Problem: Nutrition  Goal: Nutrient intake appropriate for maintaining nutritional needs  Outcome: Progressing     Problem: Fall/Injury  Goal: Not fall by end of shift  Outcome: Progressing  Goal: Be free from injury by end of the shift  Outcome: Progressing  Goal: Verbalize understanding of personal risk factors for fall in the hospital  Outcome: Progressing  Goal: Verbalize understanding of risk factor reduction measures to prevent injury from fall in the home  Outcome: Progressing   The patient's goals for the shift include      The clinical goals for the shift include Patient will remain hemodynamically stable, blood sugar controlled

## 2025-03-26 NOTE — PROGRESS NOTES
Transplant Nephrology progress note     Date of admission: 3/18/2025     Manolo Bashir is a 69 y.o.  with [facility]   Past Medical History:   Diagnosis Date    Anemia     Arthritis     Cataract     Chronic kidney disease, stage 3 unspecified (Multi) 09/26/2018    Stage 3 chronic kidney disease    CKD (chronic kidney disease)     stage V    Cough 02/12/2024    COVID-19 06/18/2020    COVID-19 virus infection    Diabetes (Multi)     ESRD (end stage renal disease) (Multi)     Focal and segmental proliferative glomerulonephritis 12/23/2023    HTN (hypertension)     Hyperlipidemia     Other long term (current) drug therapy 07/20/2021    High risk medication use    Personal history of other diseases of the circulatory system     Personal history of cardiac murmur    Personal history of other infectious and parasitic diseases 08/17/2015    History of hepatitis    Polyp, colonic 08/17/2023    Primary osteoarthritis of both ankles 08/17/2023    Prostate cancer (Multi)     Tubular adenoma of colon 08/17/2023    Unspecified kidney failure 08/17/2016    Renal failure        SUBJECTIVE:  Scheduled for TDC placement today, fistulogram as outpt due to IR staffing issues.   Diarrhea improved.  No acute issues overnight.    ALLERGIES:  No Known Allergies         CURRENT MEDICATIONS:  Scheduled medications  carvedilol, 37.5 mg, oral, BID  [Held by provider] cinacalcet, 30 mg, oral, Daily  clotrimazole, , Topical, BID  dextrose, 25 g, intravenous, Once  fluocinonide, , Topical, BID  imiquimod, 1 packet, Topical, Once per day on Monday Wednesday Friday  insulin glargine, 3 Units, subcutaneous, q24h  insulin lispro, 0-5 Units, subcutaneous, TID AC  isosorbide mononitrate ER, 60 mg, oral, Daily  NIFEdipine ER, 60 mg, oral, BID  pantoprazole, 40 mg, oral, Daily before breakfast  pravastatin, 10 mg, oral, Nightly  predniSONE, 5 mg, oral, Daily  [Held by provider] sevelamer carbonate, 800 mg, oral, TID AC  tacrolimus, 0.5 mg, oral, BID  vitamin B  "complex-vitamin C-folic acid, 1 capsule, oral, Daily      Continuous medications       PRN medications  PRN medications: acetaminophen, dextrose, dextrose, glucagon, glucagon, traMADol, trimethobenzamide       OBJECTIVE:    VITALS: Visit Vitals  /69 (BP Location: Right arm, Patient Position: Lying)   Pulse 63   Temp 36.9 °C (98.5 °F) (Temporal)   Resp 15   Ht 1.803 m (5' 11\")   Wt 73.2 kg (161 lb 6 oz)   SpO2 100%   BMI 22.51 kg/m²   Smoking Status Never   BSA 1.91 m²        General: No distress   CVS: S1 S2 no murmurs  RESP:  Lungs clear to auscultation   ABDO: Soft, non-tender   Neuro: A + O x 3  Skin: No rash   Extremities: No edema  Access: LUE AVF       LABS:  Results from last 72 hours   Lab Units 03/25/25  0748 03/24/25  0457 03/23/25  0650   WBC AUTO x10*3/uL 3.0* 2.5* 2.7*   HEMOGLOBIN g/dL 9.7* 9.5* 10.0*   MCV fL 87 84 83   PLATELETS AUTO x10*3/uL 84* 63* 80*   BUN mg/dL  --   --  15   CREATININE mg/dL  --   --  7.46*   CALCIUM mg/dL  --   --  8.3*   TACROLIMUS ng/mL  --  2.3 2.9            Intake/Output Summary (Last 24 hours) at 3/25/2025 2123  Last data filed at 3/25/2025 1500  Gross per 24 hour   Intake 1000 ml   Output 1400 ml   Net -400 ml            ASSESSMENT AND PLAN:  Manolo Bashir is a 69 y.o. male presenting with LLQ allograft tenderness and gross hematuria.  He has ESKD attributed to hypertension since 1998 s/p kidney transplant #1 3/26/1992 that failed 11/13/2006), s/p kidney transplant #2 7/13/2013 which failed in May 2024 following which he has been on HD ([facility], [facility] via [facility]G) . He also has known MGUS with IgG and IgA lambda (bone marrow bx 10/2023 with no plasma cell dyscrasia), DM2, HTN, prostate cancer s/p radical prostatectomy, baseline urinary incontinence, HCV s/p treatment (recent HCV PCR negative 7/2024).  Currently presented with a missed dialysis in the setting of diarrhea      #ESRD TTS   -HD at [facility], nephrologist Dr. Bridges, DW 69kg  -cont HD TTS. HD " today per submitted orders after tunneled HD line placemetn by IR.   - fistulogram to be scheduled as outpt    #Diarrhea  -CMV, C diff negative  -Rotavirus positive -improving     # Immunosuppression-Tac 0.5mg BID, Pred 5mg daily  #Graft intolerance/chronic rejection - transplant nephrectomy outpatient planned    #Hypertension -> blood pressures are optimal, on nifedipine, Imdur, carvedilol.   #CKD-MBD -calcium and phosphorus levels are optimal.    Likely discharge home soon    Raad Rolle MD

## 2025-03-27 LAB
ATRIAL RATE: 67 BPM
ATRIAL RATE: 75 BPM
ATRIAL RATE: 85 BPM
P AXIS: 50 DEGREES
P AXIS: 61 DEGREES
P AXIS: 68 DEGREES
P OFFSET: 175 MS
P OFFSET: 176 MS
P OFFSET: 198 MS
P ONSET: 133 MS
P ONSET: 137 MS
P ONSET: 143 MS
PR INTERVAL: 132 MS
PR INTERVAL: 164 MS
PR INTERVAL: 166 MS
Q ONSET: 209 MS
Q ONSET: 216 MS
Q ONSET: 219 MS
QRS COUNT: 11 BEATS
QRS COUNT: 12 BEATS
QRS COUNT: 14 BEATS
QRS DURATION: 130 MS
QRS DURATION: 136 MS
QRS DURATION: 154 MS
QT INTERVAL: 408 MS
QT INTERVAL: 444 MS
QT INTERVAL: 494 MS
QTC CALCULATION(BAZETT): 485 MS
QTC CALCULATION(BAZETT): 495 MS
QTC CALCULATION(BAZETT): 521 MS
QTC FREDERICIA: 458 MS
QTC FREDERICIA: 478 MS
QTC FREDERICIA: 512 MS
R AXIS: -14 DEGREES
R AXIS: -43 DEGREES
R AXIS: -55 DEGREES
T AXIS: -12 DEGREES
T AXIS: -31 DEGREES
T AXIS: -7 DEGREES
T OFFSET: 423 MS
T OFFSET: 438 MS
T OFFSET: 456 MS
VENTRICULAR RATE: 67 BPM
VENTRICULAR RATE: 75 BPM
VENTRICULAR RATE: 85 BPM

## 2025-03-27 NOTE — DISCHARGE SUMMARY
INTERNAL MEDICINE DISCHARGE SUMMARY             Discharge Diagnosis   Hyperkalemia    Issues Requiring Follow-Up   None     Test Results Pending At Discharge     Pending Labs       No current pending labs.          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 diff (treated w/ vancomycin PO 10 day course EOT 2/6/25), as well as development of graft intolerance over past 2 months, currently being evaluated for nephrectomy due to persistent graft related left lower quadrant pain, and gastroparesiswho presented to [facility] with a chief complaint of missed dialysis due to 2-3 weeks of on going diarrhea following recent discharge.      On evaluation, patient recently received morphine and is somnolent and a poor/unreliable historian. History obtained via patient and SO at bedside. On interview, patient reports that he was recommended to discharge to SNF following mot recent discharge but elected for dispo to home. He reports that he intervally developed diarrhea ~2-3 weeks ago and attributes this diarrhea to the initiation of PO abx during his last hospitalization. He reports 8-10 watery/loose diarrhea episodes daily. He denies any further infectious sx including fevers/chills, NS, and myalgias. He does not fatigue. He deneis any recent travel, new rashes, sick contact, or changes in diet. He denies joint pain and new supplementation. He reports chronic LLQ abdominal pain. He reports that he has had nausea and emesis, and has been unable to tolerate PO intake since this AM. He  endorses several lb weight loss and lethargy. He reports his last HD session was 03/15. He denies a history of pancreatitis and alchol abuse. He denies hot/cold intolerance.     Initial labs significant for hyperkalemia (5.5->6.2), WBC 3.9, Hgb 12.5, plt 103, Cre 11.89, bicarb 21, Na 136.     He was initially admitted to medicine and received dialysis through his AVF on 3/18 evening (no fluid removal). K was noted to be 5.0 following dialysis.     On 3/19 AM, patient was hypotensive with BP 77/49. Given 500cc and noted to be hyperkalemic to  7.1. Given calcium, lokelma, insulin. 2nd liter of fluid initiated. Concern that patient could not receive dialysis for hyperkalemia on the floor given low BP so transferred to the MICU. Just prior to transporting to MICU, patient began c/o midsternal chest pain accompanied with belching- 12 lead ECG obtained and troponin drawn.        Per chart review, [place] [facility] [place]:     01/0505/08/2025- presented with LLQ abdominal pain and hematuria. CT abdomen pelvis with fat stranding adjacent to the left iliac fossa renal transplant concerning for pyelonephritis, no evidence of any perinephric fluid collection was noted. Evalauted by Transplant Nephrology who rec Solumedrol 125 mg/day x 3 days (1/6-1/8/25) then prednisone 40 mg/day x 5 days then 20 mg/day x 5 days then 10 mg/day x 5 days then keep at prednisone 5 mg/day. Patient continued on TAC 0.5 mg.  Also treated with empiric Abx d/t c/f Pyelonephritis, but discontinued after 3 days given reassuring workup.   01/27-02/05- presented with SOB, nausea/vomiting, diarrhea, and general malaise that started during dialysis. Noted to be febrile and hypoxic on presentation with workup concerning for FO vs PNA managed with iHD and IV abx--> Levaquin. Found to have positive C. Diff PCR so oral vancomycin was started for 10 days total. CT A/P was obtained d/t persistent LLQ ab pain which was concerning  for edema of the transplanted kidney. Transplant surgery was consulted for potential nephrectomy. ID was also consulted due to concern for pneumonia/colitis in an immunocompromised patient however they were less concerned for pneumonia given improvement in his CXR so pneumonia coverage was discontinued. Ultimately managed with prednisone taper and patient's abdominal pain significantly improved. His diarrhea also improved with oral vancomycin.  02/28-03/06- presented with chest pain, found to be fluid ocerloaded, but also with concern for oppurtunistic infection given immunocompromised state. Evaluated by Pulmonary Medicine c/d d/t GGO and c/f atypical infection who deferred thoracentesis as effusions improved with HD and bronchoscopy deferred d/t low likelihood of infection. Evalauated by ID who recommended isavuconazole (3 month course) and augmentin for 6 week course due to c/f atypical PNA. He was evaluated by transplant surgery but did not have inpatient Nephrectomy.      Pertinent Physical Exam At Time of Discharge     Physical Exam  Constitutional:       Appearance: Normal appearance.   HENT:      Head: Normocephalic and atraumatic.      Nose: Nose normal.      Mouth/Throat:      Mouth: Mucous membranes are dry.   Eyes:      Extraocular Movements: Extraocular movements intact.      Pupils: Pupils are equal, round, and reactive to light.   Cardiovascular:      Rate and Rhythm: Normal rate and regular rhythm.      Pulses: Normal pulses.      Heart sounds: Normal heart sounds.   Pulmonary:      Effort: Pulmonary effort is normal.      Breath sounds: Normal breath sounds.   Abdominal:      General: Bowel sounds are normal.      Palpations: Abdomen is soft.   Musculoskeletal:         General: Normal range of motion.      Cervical back: Normal range of motion and neck supple.   Neurological:      General: No focal deficit present.      Mental Status: He is alert and oriented to person, place, and time.  "  Psychiatric:         Mood and Affect: Mood normal.         Behavior: Behavior normal.     Home Medications        Medication List      CONTINUE taking these medications     acetaminophen 325 mg tablet; Commonly known as: Tylenol   amoxicillin-pot clavulanate 500-125 mg tablet; Commonly known as:   Augmentin; Take 1 tablet by mouth once every 24 hours for 42 doses. Take   at night, after dialysis.   BD Luer-Rita Syringe 3 mL 25 x 5/8\" syringe; Generic drug: syringe with   needle; Use to inject epogen.   benzonatate 100 mg capsule; Commonly known as: Tessalon; Take 1 capsule   (100 mg) by mouth 3 times a day as needed for cough. Do not crush or chew.   carvedilol 12.5 mg tablet; Commonly known as: Coreg; Take 3 tablets   (37.5 mg) by mouth 2 times a day. Hold for systolic BP <140 and HR <60.   PATIENT NEEDS TO FOLLOW UP WITH CARDIOLOGY   cinacalcet 30 mg tablet; Commonly known as: Sensipar; Take 1 tablet (30   mg) by mouth once daily.   clotrimazole 1 % cream; Commonly known as: Lotrimin; Apply topically 2   times a day.   Cresemba 186 mg capsule capsule; Generic drug: isavuconazonium sulfate;   Take 2 capsules (372 mg) by mouth once daily. Take 2 capsules 3/6 at 10pm   and 2 capsules 3/7 6am, then starting 3/8 take 2 capsules once a day   Deep Sea Nasal 0.65 % nasal spray; Generic drug: sodium chloride;   Administer 1 spray into each nostril 4 times a day as needed for   congestion.   Easy Touch Alcohol Prep Pads pads, medicated; Generic drug: alcohol   swabs   Epogen 10,000 unit/mL injection; Generic drug: epoetin aida; Inject 1 mL   (10,000 Units) under the skin every 7 days. Do not start before April 17, 2024.   fluocinonide 0.05 % ointment; Commonly known as: Lidex; Apply to   affected areas twice daily when active as needed. Use less than 14 days   per month.   Gvoke HypoPen 1-Pack 1 mg/0.2 mL auto-injector; Generic drug: glucagon;   Inject 1 mg into the muscle every 15 minutes if needed (For blood glucose "   41 to 70 mg/dL and no IV access).   imiquimod 5 % cream; Commonly known as: Aldara; Apply 1 packet topically   3 times a week.   insulin lispro 100 unit/mL pen; Commonly known as: HumaLOG; Inject 0-5   Units under the skin 3 times a day before meals. Take as directed per   insulin instructions.   isosorbide mononitrate ER 60 mg 24 hr tablet; Commonly known as: Imdur;   Take 1 tablet (60 mg) by mouth once daily.   ketorolac 0.5 % ophthalmic solution; Commonly known as: Acular; Instill   1 drop into left eye three times a day FOR USE AFTER CATARACT SURGERY   Lantus Solostar U-100 Insulin 100 unit/mL (3 mL) pen; Generic drug:   insulin glargine; Inject 6 Units under the skin once daily in the morning.   Inject 6 units daily as directed, if glucose is above 150 increase by 2   units to a total of 8 units   metoclopramide 5 mg tablet; Commonly known as: Reglan; Take 1 tablet (5   mg) by mouth 3 times a day before meals.   neomycin-polymyxin-dexAMETHasone 3.5mg/mL-10,000 unit/mL-0.1 %   ophthalmic suspension; Commonly known as: Maxitrol; Administer 2 drops   into the left eye once daily in the morning.   NIFEdipine ER 90 mg 24 hr tablet; Commonly known as: Adalat CC; Take 1   tablet (90 mg) by mouth 2 times a day. Do not crush, chew, or split.   pantoprazole 40 mg EC tablet; Commonly known as: ProtoNix; Take 1 tablet   (40 mg) by mouth once daily in the morning. Take before meals. Do not   crush, chew, or split. Do not start before January 22, 2024.   pravastatin 10 mg tablet; Commonly known as: Pravachol; Take 1 tablet   (10 mg) by mouth once daily at bedtime.   predniSONE 5 mg tablet; Commonly known as: Deltasone; Take 1 tablet (5   mg) by mouth once daily.   Renal Caps 1 mg capsule; Generic drug: vitamin B complex-vitamin C-folic   acid; Take 1 capsule by mouth once daily.   sevelamer carbonate 800 mg tablet; Commonly known as: Renvela; Take 1   tablet (800 mg) by mouth 3 times a day before meals. Swallow tablet  "whole;   do not crush, break, or chew.   * tacrolimus 0.5 mg capsule; Commonly known as: Prograf; Take 1 capsule   (0.5 mg) by mouth 2 times a day.   TRUEdraw Lancing Device misc; Generic drug: lancing device; use as   directed   TRUEplus Pen Needle 32 gauge x 5/32\" needle; Generic drug: pen needle,   diabetic; Use 4 a day as directed   * Unilet Super Thin Lancets 30 gauge misc; Generic drug: lancets; USE TO   TEST BLOOD SUGAR THREE TIMES A DAY   * Unilet Super Thin Lancets 30 gauge misc; Generic drug: lancets; 1 each   3 times a day.  * This list has 3 medication(s) that are the same as other medications   prescribed for you. Read the directions carefully, and ask your doctor or   other care provider to review them with you.     ASK your doctor about these medications     * tacrolimus 0.1 % ointment; Commonly known as: Protopic; Apply   topically 2 times a day.  * This list has 1 medication(s) that are the same as other medications   prescribed for you. Read the directions carefully, and ask your doctor or   other care provider to review them with you.     Outpatient Follow-Up     Future Appointments   Date Time Provider Department [place]   3/31/2025 10:00 AM Cali Mai MD [id number] [facility]   4/4/2025  2:30 [place] [facility] [place] [facility]   4/7/2025 11:00 AM Daxa Cote DPM [id number] [facility]   4/10/2025  1:40 PM Chasidy Cabrales, APRN-CNP [id number] [facility]   4/22/2025  2:30 PM Elías Penn APRN-CNP [id number] [facility]   4/23/2025  1:40 PM Maciel Dominguez MD [id number] [place]   5/14/2025  9:40 AM Estella Quinonez MD [id number] [facility]   5/19/2025  1:00 PM Narayan Preston MD MPH [id number] [facility]   5/21/2025 10:40 AM Melia Qiu PA-C [id number] [facility]   6/18/2025 10:00 AM Vinicius Araiza MD [id number] [facility]       Germania Jesus MD    "

## 2025-03-28 DIAGNOSIS — R91.8 GROUND GLASS OPACITY PRESENT ON IMAGING OF LUNG: ICD-10-CM

## 2025-03-28 LAB
ATRIAL RATE: 83 BPM
P AXIS: 102 DEGREES
P OFFSET: 172 MS
P ONSET: 131 MS
PR INTERVAL: 168 MS
Q ONSET: 215 MS
QRS COUNT: 14 BEATS
QRS DURATION: 138 MS
QT INTERVAL: 438 MS
QTC CALCULATION(BAZETT): 514 MS
QTC FREDERICIA: 488 MS
R AXIS: -17 DEGREES
T AXIS: 34 DEGREES
T OFFSET: 434 MS
VENTRICULAR RATE: 83 BPM

## 2025-03-29 DIAGNOSIS — Z94.0 KIDNEY REPLACED BY TRANSPLANT (HHS-HCC): ICD-10-CM

## 2025-03-29 PROCEDURE — RXMED WILLOW AMBULATORY MEDICATION CHARGE

## 2025-03-31 ENCOUNTER — PHARMACY VISIT (OUTPATIENT)
Dept: PHARMACY | Facility: CLINIC | Age: 70
End: 2025-03-31
Payer: COMMERCIAL

## 2025-03-31 ENCOUNTER — OFFICE VISIT (OUTPATIENT)
Dept: CARDIOLOGY | Facility: CLINIC | Age: 70
End: 2025-03-31
Payer: MEDICARE

## 2025-03-31 VITALS
BODY MASS INDEX: 23.04 KG/M2 | HEIGHT: 68 IN | SYSTOLIC BLOOD PRESSURE: 141 MMHG | DIASTOLIC BLOOD PRESSURE: 69 MMHG | WEIGHT: 152 LBS | HEART RATE: 77 BPM | OXYGEN SATURATION: 95 %

## 2025-03-31 DIAGNOSIS — R07.9 CHEST PAIN, UNSPECIFIED TYPE: ICD-10-CM

## 2025-03-31 DIAGNOSIS — Z01.818 PRE-TRANSPLANT EVALUATION FOR KIDNEY TRANSPLANT: Primary | ICD-10-CM

## 2025-03-31 DIAGNOSIS — I50.32 CHRONIC HEART FAILURE WITH PRESERVED EJECTION FRACTION: ICD-10-CM

## 2025-03-31 DIAGNOSIS — T86.11 CHRONIC REJECTION OF KIDNEY TRANSPLANT (HHS-HCC): ICD-10-CM

## 2025-03-31 PROCEDURE — 1111F DSCHRG MED/CURRENT MED MERGE: CPT | Performed by: STUDENT IN AN ORGANIZED HEALTH CARE EDUCATION/TRAINING PROGRAM

## 2025-03-31 PROCEDURE — 99214 OFFICE O/P EST MOD 30 MIN: CPT | Performed by: STUDENT IN AN ORGANIZED HEALTH CARE EDUCATION/TRAINING PROGRAM

## 2025-03-31 PROCEDURE — 3077F SYST BP >= 140 MM HG: CPT | Performed by: STUDENT IN AN ORGANIZED HEALTH CARE EDUCATION/TRAINING PROGRAM

## 2025-03-31 PROCEDURE — 1160F RVW MEDS BY RX/DR IN RCRD: CPT | Performed by: STUDENT IN AN ORGANIZED HEALTH CARE EDUCATION/TRAINING PROGRAM

## 2025-03-31 PROCEDURE — 1159F MED LIST DOCD IN RCRD: CPT | Performed by: STUDENT IN AN ORGANIZED HEALTH CARE EDUCATION/TRAINING PROGRAM

## 2025-03-31 PROCEDURE — 3008F BODY MASS INDEX DOCD: CPT | Performed by: STUDENT IN AN ORGANIZED HEALTH CARE EDUCATION/TRAINING PROGRAM

## 2025-03-31 PROCEDURE — G2211 COMPLEX E/M VISIT ADD ON: HCPCS | Performed by: STUDENT IN AN ORGANIZED HEALTH CARE EDUCATION/TRAINING PROGRAM

## 2025-03-31 PROCEDURE — 3078F DIAST BP <80 MM HG: CPT | Performed by: STUDENT IN AN ORGANIZED HEALTH CARE EDUCATION/TRAINING PROGRAM

## 2025-03-31 PROCEDURE — RXMED WILLOW AMBULATORY MEDICATION CHARGE

## 2025-03-31 RX ORDER — ISOSORBIDE MONONITRATE 60 MG/1
60 TABLET, EXTENDED RELEASE ORAL DAILY
Qty: 90 TABLET | Refills: 3 | Status: SHIPPED | OUTPATIENT
Start: 2025-03-31 | End: 2026-03-31

## 2025-03-31 RX ORDER — CARVEDILOL 12.5 MG/1
37.5 TABLET ORAL 2 TIMES DAILY
Qty: 90 TABLET | Refills: 3 | Status: SHIPPED | OUTPATIENT
Start: 2025-03-31

## 2025-03-31 RX ORDER — NIFEDIPINE 90 MG/1
90 TABLET, EXTENDED RELEASE ORAL 2 TIMES DAILY
Qty: 180 TABLET | Refills: 3 | Status: SHIPPED | OUTPATIENT
Start: 2025-03-31 | End: 2026-03-31

## 2025-03-31 RX ORDER — ACETAMINOPHEN 325 MG/1
975 TABLET ORAL EVERY 6 HOURS PRN
Qty: 30 TABLET | Refills: 11 | OUTPATIENT
Start: 2025-03-31 | End: 2026-03-31

## 2025-03-31 NOTE — ASSESSMENT & PLAN NOTE
- As defined on prior assessment, given patient's results, there is no contraindication to list for kidney transplant from the cardiac standpoint.   - Stress test every 2 years

## 2025-03-31 NOTE — PROGRESS NOTES
Location of visit: [address]   Type of Visit: Established - Last Seen: 9/25/2024     Chief Complaint:  Patient was referred to Cardiology for pretransplant evaluation, today comes for follow-up of blood pressure.    History Of Present Illness:    Manolo Bashir is a 69 y.o. male, with history significant for kidney transplant x2, HTN, mild coronary calcification, hypertensive heart disease, HFpEF, HLD, HTN, mild dilation of ascending aorta, T2DM, GERD, ESRD on hemodialysis, who visits Cardiology today as a follow-up. On prior assessment he was granted clearance for listing and recommended Cardiology follow-up every 2 years with stress test. He was recently admitted (last week) due to diarrhea and dehydration chest pain.     Patient denies chest pain, dyspnea on exertion, shortness of breath, orthopnea, PND, nocturia, edema, palpitations, dizziness, lightheadedness, syncope, claudication, or snoring/apnea.    Blood pressure: 141/69 mmHg  HR: 77 bpm    Past Medical History:  He has a past medical history of Anemia, Arthritis, Cataract, Chronic kidney disease, stage 3 unspecified (Multi) (09/26/2018), CKD (chronic kidney disease), Cough (02/12/2024), COVID-19 (06/18/2020), Diabetes (Multi), ESRD (end stage renal disease) (Multi), Focal and segmental proliferative glomerulonephritis (12/23/2023), HTN (hypertension), Hyperlipidemia, Other long term (current) drug therapy (07/20/2021), Personal history of other diseases of the circulatory system, Personal history of other infectious and parasitic diseases (08/17/2015), Polyp, colonic (08/17/2023), Primary osteoarthritis of both ankles (08/17/2023), Prostate cancer (Multi), Tubular adenoma of colon (08/17/2023), and Unspecified kidney failure (08/17/2016).    Past Surgical History:  He has a past surgical history that includes Prostatectomy (10/11/2013); Ileostomy (04/25/2017); Other surgical history (04/21/2017); Ileostomy closure (08/17/2015); Other surgical  history (08/17/2015); Other surgical history (08/17/2015); US guided percutaneous peritoneal or retroperitoneal fluid collection drainage (10/20/2022); transplant, kidney, open (1992); transplant, kidney, open (2013); and US guided percutaneous biopsy renal left (Left, 11/20/2023).    Social History:  He reports that he has never smoked. He has been exposed to tobacco smoke. He has never used smokeless tobacco.  Drug: Oxycodone. He reports that he does not drink alcohol.    Family History:  Family History   Problem Relation Name Age of Onset    Bone cancer Mother      Other (corona's sarcome of the bone marrow) Mother      Prostate cancer Father      Diabetes Other Family Hist     Hypertension Other Family Hist      Allergies:  Patient has no known allergies.    Outpatient Medications:  Current Outpatient Medications   Medication Instructions    acetaminophen (Tylenol) 325 mg tablet 3 tablets, Every 6 hours PRN    amoxicillin-pot clavulanate (Augmentin) 500-125 mg tablet 1 tablet, oral, Every 24 hours scheduled, Take at night, after dialysis.    benzonatate (TESSALON) 100 mg, oral, 3 times daily PRN, Do not crush or chew.    carvedilol (COREG) 37.5 mg, oral, 2 times daily, Hold for systolic BP <140 and HR <60. PATIENT NEEDS TO FOLLOW UP WITH CARDIOLOGY    cinacalcet (SENSIPAR) 30 mg, oral, Daily    clotrimazole (Lotrimin) 1 % cream Topical, 2 times daily    Cresemba 372 mg, oral, Daily, Take 2 capsules 3/6 at 10pm and 2 capsules 3/7 6am, then starting 3/8 take 2 capsules once a day    Easy Touch Alcohol Prep Pads pads, medicated     epoetin aida 10,000 unit/mL injection Inject 1 mL (10,000 Units) under the skin every 7 days. Do not start before April 17, 2024.    fluocinonide (Lidex) 0.05 % ointment Apply to affected areas twice daily when active as needed. Use less than 14 days per month.    Gvoke HypoPen 1-Pack 1 mg, intramuscular, Every 15 min PRN    imiquimod (Aldara) 5 % cream 1 packet, Topical, 3 times weekly  "   insulin lispro (HUMALOG) 0-5 Units, subcutaneous, 3 times daily before meals, Take as directed per insulin instructions.    isosorbide mononitrate ER (IMDUR) 60 mg, oral, Daily    ketorolac (Acular) 0.5 % ophthalmic solution Instill 1 drop into left eye three times a day FOR USE AFTER CATARACT SURGERY    lancets (Unilet Super Thin Lancets) 30 gauge misc 3 times daily    lancing device misc use as directed    Lantus Solostar U-100 Insulin 6 Units, subcutaneous, Every morning, Inject 6 units daily as directed, if glucose is above 150 increase by 2 units to a total of 8 units    metoclopramide (REGLAN) 5 mg, oral, 3 times daily before meals    neomycin-polymyxin-dexAMETHasone (Maxitrol) 3.5mg/mL-10,000 unit/mL-0.1 % ophthalmic suspension 2 drops, Left Eye, Every morning    NIFEdipine ER (ADALAT CC) 90 mg, oral, 2 times daily, Do not crush, chew, or split.    pantoprazole (ProtoNix) 40 mg EC tablet Take 1 tablet (40 mg) by mouth once daily in the morning. Take before meals. Do not crush, chew, or split. Do not start before January 22, 2024.    pen needle, diabetic (TechLITE Pen Needle) 32 gauge x 5/32\" needle Use 4 a day as directed    pravastatin (PRAVACHOL) 10 mg, oral, Nightly    predniSONE (DELTASONE) 5 mg, oral, Daily    sevelamer carbonate (RENVELA) 800 mg, oral, 3 times daily before meals, Swallow tablet whole; do not crush, break, or chew.    sodium chloride (Ocean) 0.65 % nasal spray 1 spray, Each Nostril, 4 times daily PRN    syringe with needle 3 mL 25 x 5/8\" syringe Use to inject epogen.    tacrolimus (PROGRAF) 0.5 mg, oral, 2 times daily    tacrolimus (Protopic) 0.1 % ointment Topical, 2 times daily    Unilet Super Thin Lancets 30 gauge misc 1 each, miscellaneous, 3 times daily    vitamin B complex-vitamin C-folic acid (Nephrocaps) 1 mg capsule 1 capsule, oral, Daily     Last Recorded Vitals:  Vitals:    03/31/25 1007   BP: 141/69   BP Location: Right arm   Patient Position: Sitting   Pulse: 77   SpO2: " "95%   Weight: 68.9 kg (152 lb)   Height: 1.727 m (5' 8\")     Physical Exam:      3/31/2025    10:07 AM 3/26/2025     8:04 AM 3/26/2025     5:30 AM 3/26/2025     4:08 AM 3/26/2025     2:26 AM 3/26/2025    12:22 AM   Vitals   Systolic 141 135 182       manual check, Dr. LIAM Rainey notified, see orders 173 173       Dr. Rainey notified and acknowledged, per provider if BP >180 then will address 181       Patients pain score 9/10, Dr. Rainey notified of blood pressure and pain   Diastolic 69 66 75       manual check, Dr. LIAM Rainey notified, see orders 78 64       Dr. Rainey notified and acknowledged, per provider if BP >180 then will address 80       Patients pain score 9/10, Dr. Rainey notified of blood pressure and pain   BP Location Right arm   Right arm     Heart Rate 77 58  70 62 57   Temp  36.9 °C (98.4 °F)  36.8 °C (98.3 °F)     Resp  18  18  19   Height 1.727 m (5' 8\")        Weight (lb) 152        BMI 23.11 kg/m2        BSA (m2) 1.82 m2        Visit Report Report          Wt Readings from Last 5 Encounters:   03/31/25 68.9 kg (152 lb)   03/25/25 73.2 kg (161 lb 6 oz)   02/27/25 68 kg (150 lb)   02/24/25 70.1 kg (154 lb 9.6 oz)   02/24/25 70.1 kg (154 lb 9.6 oz)     General: Sitting up comfortably in chair; in no apparent distress.  HEENT: Normocephalic; atraumatic. Well hydrated.  Eyes: Anicteric sclera. Extraocular movement intact.  Neck: Supple; no thyromegaly; normal jugular venous pressure, no bruits.  Respiratory: Bilateral air entry equal. No wheezing.  Cardiovascular: Normal S1, S2; no murmurs auscultated.  Abdomen: Nondistended; nontender. (+) bowel sounds.  Extremities: No peripheral edema present. Pulses 2+ diffusely.  Neurological: Oriented to time, place, and person; nonfocal.  Psychiatric: Normal affect.     Last Labs Reviewed:  CBC -  Recent Labs     03/26/25  0650 03/25/25  0748 03/24/25  0457 03/23/25  0650 03/22/25  0556   WBC 3.3* 3.0* 2.5* 2.7* 2.6*   HGB 9.6* 9.7* 9.5* 10.0* 10.0*   HCT " 30.0* 31.3* 29.1* 30.3* 30.4*   PLT 95* 84* 63* 80* 80*   MCV 86 87 84 83 86     CMP -  Recent Labs     03/26/25  0650 03/25/25  0748 03/24/25  0457 03/23/25  0650 03/22/25  0556 03/21/25  0544 03/20/25  0448 03/19/25 2056   NA  --   --   --  138 133* 134* 135* 137   K  --   --   --  3.7 3.9 4.6 4.3 3.7   CL  --   --   --  94* 98 99 97* 97*   CO2  --   --   --  32 25 25 26 28   ANIONGAP  --   --   --  16 14 15 16 16   BUN  --   --   --  15 34* 23 14 13   CREATININE  --   --   --  7.46* 11.62* 8.57* 6.69* 5.13*   EGFR  --   --   --  7* 4* 6* 8* 12*   MG 1.96 1.85 1.93 2.02 2.00 2.04 2.07  --    CALCIUM  --   --   --  8.3* 8.4* 8.7 9.2 9.1     Recent Labs     03/23/25  0650 03/22/25  0556 03/21/25  0544 03/20/25  0448 03/19/25 2056 03/19/25  0717 03/18/25  2206 03/18/25  1138 02/28/25  0648 02/27/25  0809 01/27/25  0653 01/26/25  0622 01/06/25  0553 01/05/25  1039 05/18/24  0452 05/17/24  0546 05/11/24  1753 05/11/24  0101 03/11/24  2356 03/11/24  0431 01/17/24  0459 01/16/24  1242   ALBUMIN 3.0* 2.9* 3.0* 3.2* 3.3*   < > 3.5 3.8   < > 3.2*   < > 3.7   < > 3.7   < > 2.9*   < > 3.4   < > 3.4   < > 2.5*   ALKPHOS  --   --   --   --   --   --  67 70  --  64  --  70  --  89   < > 41   < > 47   < > 47   < > 46   ALT  --   --   --   --   --   --  12 14  --  8*  --  11  --  16   < > 14   < > 48   < > 44   < > 15   AST  --   --   --   --   --   --  17 19  --  16  --  32  --  16   < > 13   < > 48*   < > 45*   < > 12   BILITOT  --   --   --   --   --   --  0.5 0.5  --  0.4  --  0.6  --  0.5   < > 0.3   < > 0.4   < > 0.4   < > 0.2   LIPASE  --   --   --   --   --   --   --  15  --   --   --   --   --   --   --  3*  --  3*  --  9  --  12    < > = values in this interval not displayed.     LIPID PANEL -   Recent Labs     12/16/24  1139 07/10/24  1203 10/26/23  1215 03/17/22  1322 02/25/21  0003 02/05/20  0953   CHOL 80 96 99 128 94 108   LDLF  --   --   --  61 50 48   LDLCALC 35  --  41  --   --   --    HDL 35.8 44.3 48.6 45.4  38.0* 50.1   TRIG 47  --  46 110 31 48     COAGULATION PANEL -  Recent Labs     02/27/25  2003 09/11/24  0412 01/17/24  0459 01/05/24  1744 11/28/23  1104 11/21/23  0628 10/20/23  0844 03/25/22  1037 01/06/22  0619 02/24/21  1551 06/07/20  0616   INR  --  1.3*  --  1.0  --   --  1.2*   < > 1.1   < > 1.2*   HAUF  --   --  0.5  --   --   --   --   --   --   --   --    HAPTOGLOBIN <30*  --   --   --  <10* <10*  --    < >  --   --   --    FIBRINOGEN  --   --   --   --   --   --  250  --  157*  --  484*    < > = values in this interval not displayed.     HEME/ENDO -  Recent Labs     03/18/25  1138 02/28/25  0648 12/16/24  1139 08/24/24  1221 07/10/24  1203 05/11/24  1244 05/11/24  0101 05/01/24  0913 03/19/24  0930 03/11/24  2356 01/20/24  0442 10/26/23  1215 08/22/23  0628 07/19/23  1024 10/16/22  1058 10/07/22  1142 08/03/22  0831 03/17/22  1322 08/15/19  1040 02/13/19  1117   FERRITIN  --  1,564*  --  985*  --  602*  --   --  720*   < >  --   --    < >  --    < >  --   --   --    < >  --    IRONSAT  --   --   --  9*  --   --  13* 25 29   < >  --   --    < >  --    < >  --   --   --    < >  --    TSH 2.84  --   --   --   --   --   --   --   --   --   --   --   --  1.94  --  0.76  --  0.86   < > 1.00   HGBA1C  --   --  9.1*  --  6.6*  --   --   --   --   --  7.7* 6.0*  --  8.4*  --  10.6*   < > 8.5*   < > 7.3   FREET4  --   --   --   --   --   --   --   --   --   --   --   --   --   --   --   --   --   --   --  1.25    < > = values in this interval not displayed.     CARDIOVASCULAR  Recent Labs     03/22/25  0556 03/20/25  1338 03/20/25  0448 03/19/25  1318 03/19/25  1102 03/19/25  0846 03/18/25  1734 02/28/25  0648 02/27/25  0959 02/27/25  0809 01/26/25  0858 01/26/25  0622 09/13/24  0520 09/10/24  1848 09/10/24  1346 08/27/24  0705 08/26/24  1337 06/26/24  1211 06/26/24  0916 05/11/24  0228 05/11/24  0101 03/11/24  0431 02/01/24  0917 12/16/23  1138 11/28/23  1104 11/21/23  1527 05/06/23  2121 10/20/22  0809  10/15/22  0916   LDH  --   --   --   --   --   --   --  239  --   --   --   --  172  --   --   --  156  --   --   --   --   --   --   --  238 209   < >  --   --    TROPHS 164* 249* 281* 224* 171*   < >  --   --    < > 33   < > 65*  --    < >  --    < >  --    < > 30   < > 41   < >  --    < >  --   --    < >  --   --    BNP  --   --   --   --   --   --   --   --   --  1,042*  --  2,810*  --   --  1,059*  --   --   --  687*  --  855*   < >  --    < >  --   --    < >  --   --    CRP  --   --   --   --   --   --  4.59* 1.88*  --   --   --   --   --   --   --   --   --   --   --   --   --   --  0.57  --   --   --   --  3.17* 25.57*    < > = values in this interval not displayed.     Last Cardiology/Imaging Tests Personally Reviewed (if images available) and Interpreted:  ECG:  Encounter Date: 03/18/25   ECG 12 Lead   Result Value    Ventricular Rate 83    Atrial Rate 83    AR Interval 168    QRS Duration 138    QT Interval 438    QTC Calculation(Bazett) 514    P Axis 102    R Axis -17    T Axis 34    QRS Count 14    Q Onset 215    P Onset 131    P Offset 172    T Offset 434    QTC Fredericia 488    Narrative    Normal sinus rhythm with sinus arrhythmia  Left ventricular hypertrophy with QRS widening     Echocardiogram:  Transthoracic Echo (TTE) Complete; 04/05/2024  1. Left ventricular systolic function is normal with a 54% estimated ejection fraction.  2. Spectral Doppler shows a pseudonormal pattern of left ventricular diastolic filling.  3. Left ventricular cavity size is severely dilated.  4. Moderately increased left ventricular septal thickness.  5. There is severe left ventricular hypertrophy.  6. Mild to moderate aortic valve regurgitation.  7. The left atrium is severely dilated.  8. The right atrium is severely dilated.  9. Mildly elevated RVSP.      Cath:  No results found.     Stress Test:  MR CARDIAC W AND WO IV CONTRAST W REGADENOSON STRESS; 4/30/2024  1. The left ventricle is dilated (EDVi-147 ml/m2) and  has overall normal global systolic function(LVEF-54 %). There are no major wall motion abnormalities.  2. Normal myocardial perfusion at rest and post stress without evidence of inducible ischemia.  3. Linear mid myocardial delayed enhancement within the basal inferolateral wall in a nonischemic pattern.  4. Circumferential left ventricular hypertrophy, most prominent in the base.  5. Mild qualitative mitral regurgitation. Regurgitant fraction = 3%.  6. Biatrial dilatation.  7. Borderline dilated main pulmonary artery which can be seen with pulmonary hypertension.     Cardiac CT/MRI:  CT cardiac scoring wo IV contrast; 09/23/2024  FINDINGS:  The score and distribution of calcium in the coronary arteries is as follows:  LM 0,  LAD 2.25,  LCx 5.02,  RCA 23.27,  Total 30.54     The visualized ascending thoracic aorta measures 4 cm in diameter. The heart is normal in size. No pericardial effusion is present. Main pulmonary artery is measuring 3.2 cm    CV RISK FACTORS:   # Hypertension: Last BP: 141/69.  # Hyperlipidemia: Last Tchol 80 / LDL No results found for requested labs within last 365 days. / HDL 35.8 / TRIG 47 (12/16/2024: 11:39 AM).  # Type II Diabetes Mellitus: Last A1c 9.1 (12/16/2024: 11:39 AM).  # Obesity: Last BMI: 23.12.  # CKD: Last BUN/Cr (GFR): 15/7.46 (7), 3/23/2025:  6:50 AM.    ASCV RISK:  The ASCVD Risk score (Abi PICKARD, et al., 2019) failed to calculate for the following reasons:    Risk score cannot be calculated because patient has a medical history suggesting prior/existing ASCVD    Assessment:  69 y.o. male, with history significant for kidney transplant x2, HTN, mild coronary calcification, hypertensive heart disease, HFpEF, HLD, HTN, mild dilation of ascending aorta, T2DM, GERD, ESRD on hemodialysis, who visits Cardiology today as a follow-up. He was recently admitted due to dehydration in the context of diarrhea. Blood pressure stable now on regular treatment.    Assessment & Plan  Chronic  heart failure with preserved ejection fraction  - Continue HTN treatment  Pre-transplant evaluation for kidney transplant  - As defined on prior assessment, given patient's results, there is no contraindication to list for kidney transplant from the cardiac standpoint.   - Stress test every 2 years    Patient will follow up with me in the Cardiology office in 1 year or as needed  I spent 35 minutes assessing the case between pre-charting, face-to-face patient interaction, and documentation    Cali Mai MD

## 2025-04-04 ENCOUNTER — HOSPITAL ENCOUNTER (OUTPATIENT)
Dept: RADIOLOGY | Facility: HOSPITAL | Age: 70
Discharge: HOME | End: 2025-04-04
Payer: COMMERCIAL

## 2025-04-04 DIAGNOSIS — J91.8 PLEURAL EFFUSION ASSOCIATED WITH PULMONARY INFECTION: ICD-10-CM

## 2025-04-04 DIAGNOSIS — J18.9 PLEURAL EFFUSION ASSOCIATED WITH PULMONARY INFECTION: ICD-10-CM

## 2025-04-04 DIAGNOSIS — R91.8 GROUND GLASS OPACITY PRESENT ON IMAGING OF LUNG: ICD-10-CM

## 2025-04-04 PROCEDURE — 71250 CT THORAX DX C-: CPT

## 2025-04-07 ENCOUNTER — APPOINTMENT (OUTPATIENT)
Dept: PODIATRY | Facility: CLINIC | Age: 70
End: 2025-04-07
Payer: COMMERCIAL

## 2025-04-07 DIAGNOSIS — G89.29 CHRONIC PAIN OF BOTH ANKLES: ICD-10-CM

## 2025-04-07 DIAGNOSIS — I73.9 PERIPHERAL VASCULAR DISEASE (CMS-HCC): ICD-10-CM

## 2025-04-07 DIAGNOSIS — M25.572 SINUS TARSI SYNDROME OF BOTH ANKLES: ICD-10-CM

## 2025-04-07 DIAGNOSIS — M25.571 SINUS TARSI SYNDROME OF BOTH ANKLES: ICD-10-CM

## 2025-04-07 DIAGNOSIS — M25.572 CHRONIC PAIN OF BOTH ANKLES: ICD-10-CM

## 2025-04-07 DIAGNOSIS — M25.571 CHRONIC PAIN OF BOTH ANKLES: ICD-10-CM

## 2025-04-07 DIAGNOSIS — E11.9 DIABETES MELLITUS TYPE 2 WITHOUT RETINOPATHY (MULTI): Primary | ICD-10-CM

## 2025-04-07 RX ORDER — TRIAMCINOLONE ACETONIDE 40 MG/ML
80 INJECTION, SUSPENSION INTRA-ARTICULAR; INTRAMUSCULAR ONCE
Status: COMPLETED | OUTPATIENT
Start: 2025-04-07 | End: 2025-04-07

## 2025-04-07 RX ADMIN — TRIAMCINOLONE ACETONIDE 80 MG: 40 INJECTION, SUSPENSION INTRA-ARTICULAR; INTRAMUSCULAR at 16:28

## 2025-04-07 NOTE — PROGRESS NOTES
History of Present Illness:     Patient states they are here for Dm exam  Denies NTB to feet  Has pain to bl ankle joints  Looking for an injection both sides    Past Medical History  Past Medical History:   Diagnosis Date    Chronic kidney disease, stage 3 unspecified (CMS/HCC) 09/26/2018    Stage 3 chronic kidney disease    COVID-19 06/18/2020    COVID-19 virus infection    Diabetes (CMS/HCC)     Focal and segmental proliferative glomerulonephritis 12/23/2023    HTN (hypertension)     Other long term (current) drug therapy 07/20/2021    High risk medication use    Personal history of other diseases of the circulatory system     Personal history of cardiac murmur    Personal history of other infectious and parasitic diseases 08/17/2015    History of hepatitis    Polyp, colonic 08/17/2023    Primary osteoarthritis of both ankles 08/17/2023    Tubular adenoma of colon 08/17/2023    Unspecified kidney failure 08/17/2016    Renal failure       Medications and Allergies have been reviewed.    Review Of Systems:  GENERAL: No weight loss, malaise or fevers.  HEENT: Negative for frequent or significant headaches,   RESPIRATORY: Negative for cough, wheezing or shortness of breath.  CARDIOVASCULAR: Negative for chest pain, leg swelling or palpitations.    Physical Exam:  Examination of Both Lower Extremities:   Vascular  CFT is 3 seconds to hallux bilaterally. No edema or varicosities noted. Hair growth is noted to the digits bilaterally.      Neurology  Gross sensation intact to bilateral foot.     Dermatology  Nails 1-5 bilaterally are within normal limits of length. No hyperkeratotic tissue noted. No subcutaneous nodules or rashes noted. No open lesions noted. Diffuse scales noted to plantar foot b/l.      Musculoskeletal  Muscle strength is 5/5 for plantarflexors, dorsiflexors, everters, and inverters bilaterally. Ankle joint ROM is decreased in dorsiflexion with the knee extended and flexed, with pain and crepitus  bilaterally. Pain on palpation to sinus tarsi and along dorsal ankle. + intrinsic muscle strength to digits 2-4 b/l.     1. Diabetes mellitus type 2 without retinopathy (Multi)  Disability Placard    dexAMETHasone (Decadron) injection 16 mg    triamcinolone acetonide (Kenalog-40) injection 80 mg      2. Peripheral vascular disease (CMS-HCC)  Disability Placard    dexAMETHasone (Decadron) injection 16 mg    triamcinolone acetonide (Kenalog-40) injection 80 mg      3. Sinus tarsi syndrome of both ankles  dexAMETHasone (Decadron) injection 16 mg    triamcinolone acetonide (Kenalog-40) injection 80 mg        Patient educated on proper diabetic foot care.  Nails 1-5 b/l were debrided in thickness and length with nail cutting forceps.  Gave bl ankle injection to bl sinus tarsi. Tolerated well  A1C revd. 9.1 Dec 2024  All hpk tissue was debrided to smooth skin  Patient to follow up in 6 mos or sooner if any problems arise.   Patient was in agreement to this plan. All questions answered.      Daxa Cote DPM  [phone]  Option 2  Fax: [phone]

## 2025-04-09 ENCOUNTER — APPOINTMENT (OUTPATIENT)
Dept: PRIMARY CARE | Facility: CLINIC | Age: 70
End: 2025-04-09
Payer: MEDICARE

## 2025-04-10 ENCOUNTER — APPOINTMENT (OUTPATIENT)
Dept: PULMONOLOGY | Facility: HOSPITAL | Age: 70
End: 2025-04-10
Payer: MEDICARE

## 2025-04-11 DIAGNOSIS — R91.8 GROUND GLASS OPACITY PRESENT ON IMAGING OF LUNG: ICD-10-CM

## 2025-04-14 ENCOUNTER — HOSPITAL ENCOUNTER (INPATIENT)
Facility: HOSPITAL | Age: 70
LOS: 2 days | Discharge: HOME | End: 2025-04-16
Attending: EMERGENCY MEDICINE | Admitting: STUDENT IN AN ORGANIZED HEALTH CARE EDUCATION/TRAINING PROGRAM
Payer: COMMERCIAL

## 2025-04-14 ENCOUNTER — APPOINTMENT (OUTPATIENT)
Dept: RADIOLOGY | Facility: HOSPITAL | Age: 70
End: 2025-04-14
Payer: COMMERCIAL

## 2025-04-14 ENCOUNTER — CLINICAL SUPPORT (OUTPATIENT)
Dept: EMERGENCY MEDICINE | Facility: HOSPITAL | Age: 70
End: 2025-04-14
Payer: COMMERCIAL

## 2025-04-14 DIAGNOSIS — R06.02 SHORTNESS OF BREATH: Primary | ICD-10-CM

## 2025-04-14 DIAGNOSIS — R11.2 NAUSEA AND VOMITING, UNSPECIFIED VOMITING TYPE: ICD-10-CM

## 2025-04-14 DIAGNOSIS — M54.9 BACK PAIN, UNSPECIFIED BACK LOCATION, UNSPECIFIED BACK PAIN LATERALITY, UNSPECIFIED CHRONICITY: ICD-10-CM

## 2025-04-14 DIAGNOSIS — I21.4 NSTEMI (NON-ST ELEVATED MYOCARDIAL INFARCTION) (MULTI): ICD-10-CM

## 2025-04-14 DIAGNOSIS — I16.1 HYPERTENSIVE EMERGENCY: ICD-10-CM

## 2025-04-14 DIAGNOSIS — J18.9 PNEUMONIA OF BOTH LUNGS DUE TO INFECTIOUS ORGANISM, UNSPECIFIED PART OF LUNG: ICD-10-CM

## 2025-04-14 DIAGNOSIS — K52.9 ENTERITIS: ICD-10-CM

## 2025-04-14 LAB
ALBUMIN SERPL BCP-MCNC: 3.9 G/DL (ref 3.4–5)
ALP SERPL-CCNC: 109 U/L (ref 33–136)
ALT SERPL W P-5'-P-CCNC: 26 U/L (ref 10–52)
ANION GAP BLDV CALCULATED.4IONS-SCNC: 15 MMOL/L (ref 10–25)
ANION GAP SERPL CALC-SCNC: 15 MMOL/L (ref 10–20)
APTT PPP: 28 SECONDS (ref 26–36)
AST SERPL W P-5'-P-CCNC: 30 U/L (ref 9–39)
ATRIAL RATE: 113 BPM
BASE EXCESS BLDV CALC-SCNC: -2 MMOL/L (ref -2–3)
BASOPHILS # BLD AUTO: 0.02 X10*3/UL (ref 0–0.1)
BASOPHILS NFR BLD AUTO: 0.3 %
BILIRUB SERPL-MCNC: 0.7 MG/DL (ref 0–1.2)
BNP SERPL-MCNC: 2479 PG/ML (ref 0–99)
BODY TEMPERATURE: 37 DEGREES CELSIUS
BUN SERPL-MCNC: 39 MG/DL (ref 6–23)
CA-I BLDV-SCNC: 1.32 MMOL/L (ref 1.1–1.33)
CALCIUM SERPL-MCNC: 10 MG/DL (ref 8.6–10.6)
CARDIAC TROPONIN I PNL SERPL HS: 38 NG/L (ref 0–53)
CARDIAC TROPONIN I PNL SERPL HS: 64 NG/L (ref 0–53)
CHLORIDE BLDV-SCNC: 101 MMOL/L (ref 98–107)
CHLORIDE SERPL-SCNC: 98 MMOL/L (ref 98–107)
CO2 SERPL-SCNC: 26 MMOL/L (ref 21–32)
CREAT SERPL-MCNC: 10.72 MG/DL (ref 0.5–1.3)
EGFRCR SERPLBLD CKD-EPI 2021: 5 ML/MIN/1.73M*2
EOSINOPHIL # BLD AUTO: 0.26 X10*3/UL (ref 0–0.7)
EOSINOPHIL NFR BLD AUTO: 3.6 %
ERYTHROCYTE [DISTWIDTH] IN BLOOD BY AUTOMATED COUNT: 16.5 % (ref 11.5–14.5)
FLUAV RNA RESP QL NAA+PROBE: NOT DETECTED
FLUBV RNA RESP QL NAA+PROBE: NOT DETECTED
GLUCOSE BLD MANUAL STRIP-MCNC: 78 MG/DL (ref 74–99)
GLUCOSE BLDV-MCNC: 109 MG/DL (ref 74–99)
GLUCOSE SERPL-MCNC: 101 MG/DL (ref 74–99)
HCO3 BLDV-SCNC: 23.1 MMOL/L (ref 22–26)
HCT VFR BLD AUTO: 36.2 % (ref 41–52)
HCT VFR BLD EST: 37 % (ref 41–52)
HGB BLD-MCNC: 11.9 G/DL (ref 13.5–17.5)
HGB BLDV-MCNC: 12.2 G/DL (ref 13.5–17.5)
IMM GRANULOCYTES # BLD AUTO: 0.05 X10*3/UL (ref 0–0.7)
IMM GRANULOCYTES NFR BLD AUTO: 0.7 % (ref 0–0.9)
INR PPP: 1.1 (ref 0.9–1.1)
LACTATE BLDV-SCNC: 1.7 MMOL/L (ref 0.4–2)
LIPASE SERPL-CCNC: 27 U/L (ref 9–82)
LYMPHOCYTES # BLD AUTO: 1.07 X10*3/UL (ref 1.2–4.8)
LYMPHOCYTES NFR BLD AUTO: 15 %
MAGNESIUM SERPL-MCNC: 1.98 MG/DL (ref 1.6–2.4)
MCH RBC QN AUTO: 28.1 PG (ref 26–34)
MCHC RBC AUTO-ENTMCNC: 32.9 G/DL (ref 32–36)
MCV RBC AUTO: 85 FL (ref 80–100)
MONOCYTES # BLD AUTO: 0.61 X10*3/UL (ref 0.1–1)
MONOCYTES NFR BLD AUTO: 8.6 %
NEUTROPHILS # BLD AUTO: 5.12 X10*3/UL (ref 1.2–7.7)
NEUTROPHILS NFR BLD AUTO: 71.8 %
NRBC BLD-RTO: 0 /100 WBCS (ref 0–0)
OXYHGB MFR BLDV: 75.1 % (ref 45–75)
P AXIS: 72 DEGREES
P OFFSET: 191 MS
P ONSET: 132 MS
PCO2 BLDV: 40 MM HG (ref 41–51)
PH BLDV: 7.37 PH (ref 7.33–7.43)
PHOSPHATE SERPL-MCNC: 3.9 MG/DL (ref 2.5–4.9)
PLATELET # BLD AUTO: 122 X10*3/UL (ref 150–450)
PO2 BLDV: 51 MM HG (ref 35–45)
POTASSIUM BLDV-SCNC: 5.6 MMOL/L (ref 3.5–5.3)
POTASSIUM SERPL-SCNC: 5.1 MMOL/L (ref 3.5–5.3)
PR INTERVAL: 164 MS
PROT SERPL-MCNC: 7.8 G/DL (ref 6.4–8.2)
PROTHROMBIN TIME: 12.5 SECONDS (ref 9.8–12.4)
Q ONSET: 214 MS
QRS COUNT: 18 BEATS
QRS DURATION: 138 MS
QT INTERVAL: 338 MS
QTC CALCULATION(BAZETT): 463 MS
QTC FREDERICIA: 417 MS
R AXIS: -66 DEGREES
RBC # BLD AUTO: 4.24 X10*6/UL (ref 4.5–5.9)
RSV RNA RESP QL NAA+PROBE: NOT DETECTED
SAO2 % BLDV: 77 % (ref 45–75)
SARS-COV-2 RNA RESP QL NAA+PROBE: NOT DETECTED
SODIUM BLDV-SCNC: 133 MMOL/L (ref 136–145)
SODIUM SERPL-SCNC: 134 MMOL/L (ref 136–145)
T AXIS: 72 DEGREES
T OFFSET: 383 MS
VENTRICULAR RATE: 113 BPM
WBC # BLD AUTO: 7.1 X10*3/UL (ref 4.4–11.3)

## 2025-04-14 PROCEDURE — 2020000001 HC ICU ROOM DAILY

## 2025-04-14 PROCEDURE — 82330 ASSAY OF CALCIUM: CPT

## 2025-04-14 PROCEDURE — 74176 CT ABD & PELVIS W/O CONTRAST: CPT | Performed by: RADIOLOGY

## 2025-04-14 PROCEDURE — 99291 CRITICAL CARE FIRST HOUR: CPT | Mod: 25 | Performed by: EMERGENCY MEDICINE

## 2025-04-14 PROCEDURE — 2500000001 HC RX 250 WO HCPCS SELF ADMINISTERED DRUGS (ALT 637 FOR MEDICARE OP): Performed by: EMERGENCY MEDICINE

## 2025-04-14 PROCEDURE — 2500000004 HC RX 250 GENERAL PHARMACY W/ HCPCS (ALT 636 FOR OP/ED)

## 2025-04-14 PROCEDURE — 85025 COMPLETE CBC W/AUTO DIFF WBC: CPT

## 2025-04-14 PROCEDURE — 83690 ASSAY OF LIPASE: CPT

## 2025-04-14 PROCEDURE — 2500000002 HC RX 250 W HCPCS SELF ADMINISTERED DRUGS (ALT 637 FOR MEDICARE OP, ALT 636 FOR OP/ED)

## 2025-04-14 PROCEDURE — 93010 ELECTROCARDIOGRAM REPORT: CPT | Performed by: EMERGENCY MEDICINE

## 2025-04-14 PROCEDURE — 84484 ASSAY OF TROPONIN QUANT: CPT

## 2025-04-14 PROCEDURE — 85730 THROMBOPLASTIN TIME PARTIAL: CPT | Performed by: EMERGENCY MEDICINE

## 2025-04-14 PROCEDURE — 84132 ASSAY OF SERUM POTASSIUM: CPT

## 2025-04-14 PROCEDURE — 36415 COLL VENOUS BLD VENIPUNCTURE: CPT | Performed by: EMERGENCY MEDICINE

## 2025-04-14 PROCEDURE — 99291 CRITICAL CARE FIRST HOUR: CPT | Performed by: EMERGENCY MEDICINE

## 2025-04-14 PROCEDURE — 82947 ASSAY GLUCOSE BLOOD QUANT: CPT

## 2025-04-14 PROCEDURE — 2500000001 HC RX 250 WO HCPCS SELF ADMINISTERED DRUGS (ALT 637 FOR MEDICARE OP)

## 2025-04-14 PROCEDURE — 84100 ASSAY OF PHOSPHORUS: CPT

## 2025-04-14 PROCEDURE — 87637 SARSCOV2&INF A&B&RSV AMP PRB: CPT

## 2025-04-14 PROCEDURE — 96365 THER/PROPH/DIAG IV INF INIT: CPT

## 2025-04-14 PROCEDURE — 74176 CT ABD & PELVIS W/O CONTRAST: CPT

## 2025-04-14 PROCEDURE — 83735 ASSAY OF MAGNESIUM: CPT

## 2025-04-14 PROCEDURE — 96375 TX/PRO/DX INJ NEW DRUG ADDON: CPT

## 2025-04-14 PROCEDURE — 71045 X-RAY EXAM CHEST 1 VIEW: CPT

## 2025-04-14 PROCEDURE — 99292 CRITICAL CARE ADDL 30 MIN: CPT | Performed by: EMERGENCY MEDICINE

## 2025-04-14 PROCEDURE — 83880 ASSAY OF NATRIURETIC PEPTIDE: CPT

## 2025-04-14 PROCEDURE — 85610 PROTHROMBIN TIME: CPT | Performed by: EMERGENCY MEDICINE

## 2025-04-14 PROCEDURE — 96374 THER/PROPH/DIAG INJ IV PUSH: CPT | Mod: 59

## 2025-04-14 PROCEDURE — 70450 CT HEAD/BRAIN W/O DYE: CPT

## 2025-04-14 PROCEDURE — 93005 ELECTROCARDIOGRAM TRACING: CPT

## 2025-04-14 PROCEDURE — 70450 CT HEAD/BRAIN W/O DYE: CPT | Performed by: RADIOLOGY

## 2025-04-14 PROCEDURE — 71045 X-RAY EXAM CHEST 1 VIEW: CPT | Performed by: RADIOLOGY

## 2025-04-14 PROCEDURE — 2500000004 HC RX 250 GENERAL PHARMACY W/ HCPCS (ALT 636 FOR OP/ED): Performed by: EMERGENCY MEDICINE

## 2025-04-14 PROCEDURE — 82435 ASSAY OF BLOOD CHLORIDE: CPT

## 2025-04-14 RX ORDER — LABETALOL HYDROCHLORIDE 5 MG/ML
20 INJECTION, SOLUTION INTRAVENOUS ONCE
Status: DISCONTINUED | OUTPATIENT
Start: 2025-04-14 | End: 2025-04-14

## 2025-04-14 RX ORDER — NITROGLYCERIN 20 MG/100ML
5-400 INJECTION INTRAVENOUS CONTINUOUS
Status: DISCONTINUED | OUTPATIENT
Start: 2025-04-14 | End: 2025-04-15

## 2025-04-14 RX ORDER — ONDANSETRON HYDROCHLORIDE 2 MG/ML
4 INJECTION, SOLUTION INTRAVENOUS ONCE
Status: COMPLETED | OUTPATIENT
Start: 2025-04-14 | End: 2025-04-14

## 2025-04-14 RX ORDER — NIFEDIPINE 90 MG/1
90 TABLET, EXTENDED RELEASE ORAL 2 TIMES DAILY
Status: DISCONTINUED | OUTPATIENT
Start: 2025-04-14 | End: 2025-04-15

## 2025-04-14 RX ORDER — ONDANSETRON HYDROCHLORIDE 2 MG/ML
INJECTION, SOLUTION INTRAVENOUS
Status: COMPLETED
Start: 2025-04-14 | End: 2025-04-14

## 2025-04-14 RX ORDER — AZITHROMYCIN 500 MG/1
500 TABLET, FILM COATED ORAL ONCE
Status: COMPLETED | OUTPATIENT
Start: 2025-04-14 | End: 2025-04-14

## 2025-04-14 RX ORDER — CEFTRIAXONE 1 G/50ML
1 INJECTION, SOLUTION INTRAVENOUS ONCE
Status: COMPLETED | OUTPATIENT
Start: 2025-04-14 | End: 2025-04-14

## 2025-04-14 RX ADMIN — CEFTRIAXONE SODIUM 1 G: 1 INJECTION, SOLUTION INTRAVENOUS at 22:33

## 2025-04-14 RX ADMIN — ONDANSETRON HYDROCHLORIDE 4 MG: 2 INJECTION, SOLUTION INTRAVENOUS at 19:31

## 2025-04-14 RX ADMIN — NITROGLYCERIN 200 MCG/MIN: 20 INJECTION INTRAVENOUS at 21:05

## 2025-04-14 RX ADMIN — ONDANSETRON 4 MG: 2 INJECTION INTRAMUSCULAR; INTRAVENOUS at 19:31

## 2025-04-14 RX ADMIN — CARVEDILOL 37.5 MG: 12.5 TABLET, FILM COATED ORAL at 22:45

## 2025-04-14 RX ADMIN — NIFEDIPINE 90 MG: 90 TABLET, FILM COATED, EXTENDED RELEASE ORAL at 22:45

## 2025-04-14 RX ADMIN — AZITHROMYCIN DIHYDRATE 500 MG: 500 TABLET ORAL at 22:45

## 2025-04-14 ASSESSMENT — LIFESTYLE VARIABLES
EVER FELT BAD OR GUILTY ABOUT YOUR DRINKING: NO
EVER HAD A DRINK FIRST THING IN THE MORNING TO STEADY YOUR NERVES TO GET RID OF A HANGOVER: NO
TOTAL SCORE: 0
HAVE YOU EVER FELT YOU SHOULD CUT DOWN ON YOUR DRINKING: NO
HAVE PEOPLE ANNOYED YOU BY CRITICIZING YOUR DRINKING: NO

## 2025-04-14 ASSESSMENT — PAIN - FUNCTIONAL ASSESSMENT: PAIN_FUNCTIONAL_ASSESSMENT: 0-10

## 2025-04-14 NOTE — ED PROVIDER NOTES
Emergency Department Provider Note        History of Present Illness     History provided by: Patient  Limitations to History: Distracting Injury/Severe Pain  External Records Reviewed with Brief Summary: Discharge Summary from 3/26/2025 which showed patient had presented for missed dialysis 2/2 ongoing diarrhea    HPI:  Manolo Bashir is a 69 y.o. male with a past medical history of ESRD s/p renal transplants x2, non-functioning: initially in 1992 c/b allograft failure 2006, 2nd transplant 2013, c/b impaired allograft function, currently on iHD since May 2024(TTS); on tacrolimus and prednisone 5mg, MGUS, DMII, hypertension, prostate cancer s/p radical prostatectomy, HCV (treated w/ Harvoni, HCV PCR negative 2019), recent history of C. diff (treated w/ vancomycin PO 10 day course EOT 2/6/25), as well as development of graft intolerance over past 2 months, currently being evaluated for nephrectomy due to persistent graft related left lower quadrant pain, and gastroparesis, presenting to the ED for nausea, vomiting, and abdominal pain. He endorses associated shortness of breath but states that is not his chief complaint today that prompted evaluation. He woke up today with these symptoms, but was otherwise in his usual state of health yesterday. He denies missing dialysis, with last session on Saturday.    Physical Exam   Triage vitals:  T 37 °C (98.6 °F)  HR (!) 109  BP (!) 247/134  RR (!) 40  O2 (!) 90 % None (Room air)    Physical Exam  Vitals and nursing note reviewed.   Constitutional:       General: He is in acute distress.      Appearance: He is ill-appearing and diaphoretic.   Cardiovascular:      Rate and Rhythm: Regular rhythm. Tachycardia present.      Pulses: Normal pulses.           Radial pulses are 2+ on the right side and 2+ on the left side.        Dorsalis pedis pulses are 2+ on the right side and 2+ on the left side.      Heart sounds: Normal heart sounds.   Pulmonary:      Effort: Tachypnea  present. No respiratory distress.      Breath sounds: Normal breath sounds. No wheezing, rhonchi or rales.   Abdominal:      Palpations: Abdomen is soft. There is no mass.      Tenderness: There is abdominal tenderness. There is no guarding or rebound.   Musculoskeletal:      Right lower leg: No edema.      Left lower leg: No edema.   Neurological:      General: No focal deficit present.      Mental Status: He is alert and oriented to person, place, and time.          Medical Decision Making & ED Course   Medical Decision Makin y.o. male with a past medical history of ESRD s/p renal transplants x2, non-functioning: initially in  c/b allograft failure , 2nd transplant , c/b impaired allograft function, currently on iHD since May 2024(TTS); on tacrolimus and prednisone 5mg, MGUS, DMII, hypertension, prostate cancer s/p radical prostatectomy, HCV (treated w/ Harvoni, HCV PCR negative ), recent history of C. diff (treated w/ vancomycin PO 10 day course EOT 25), as well as development of graft intolerance over past 2 months, currently being evaluated for nephrectomy due to persistent graft related left lower quadrant pain, and gastroparesis, presenting to the ED for nausea, vomiting, and abdominal pain. On arrival, he was in acute distress, diaphoretic, actively vomiting, and hypertensive to the 240s. He became hypoxic to 88-89% on RA and was placed on supplemental O2 via NC while EKG was obtained. EKG did not reveal STEMI or prolonged QT, and he was quickly given a dose of zofran for nausea and vomiting. Shortly after this, he did have an acute mental status change with decreased responsiveness and confusion. A BAT was therefore called at that time with NIH of 3, however CTH was negative for acute findings. Neurology did evaluate the patient at that time, and BAT was canceled as they believe his mental status change was likely due to hypertension or toxic metabolic encephalopathy. As CXR did  reveal pulmonary edema and possible pneumonia, a nitroglycerin drip was started for concern for SCAPE. This did result in improvement in respiratory status, however blood pressure did not significantly decrease. He was also treated for possible CAP with ceftriaxone. CT abdomen pelvis without contrast was obtained at the time of BAT given his chief complaint, and did reveal signs of enteritis, however no other acute intraabdominal findings. Labs were also obtained prior to CT to evaluate for pancreatitis, ACS, fluid overload, electrolyte disturbances/hyperkalemia, and signs of infection. Initial troponin was normal, however did increase on repeat at one hour. Labs were otherwise unrevealing. Given his rising troponin, pulmonary edema, hypertension, and need for nitroglycerin drip, MICU attending was contacted for likely admission. The patient was ultimately signed out to the oncoming provider, Dr. Jaramillo, while awaiting MICU call.  ----  Scoring Tools Utilized: HEART Score: 4       Differential diagnoses considered include but are not limited to: ACS, DKA, aortic dissection, PE, bowel obstruction, gastroparesis, pancreatitis, SCAPE, hypertensive emergency, pneumonia, CHF exacerbation, viral gastroenteritis     Social Determinants of Health which Significantly Impact Care: None identified     EKG Independent Interpretation: The EKG obtained at 1852 was independently interpreted by myself. It demonstrates sinus tachycardia with a ventricular rate of 113. Leftward deviated axis. QRS slightly widened. ST segments showed no STEMI. No significant change compared to prior EKG from 3/20/2025.    Independent Result Review and Interpretation: Relevant laboratory and radiographic results were reviewed and independently interpreted by myself.  As necessary, they are commented on in the ED Course.    Chronic conditions affecting the patient's care: As documented above in MDM    The patient was discussed with the following  consultants/services: Admission Coordinator who accepted the patient for admission    Care Considerations: As documented above in [facility]    ED Course:  ED Course as of 04/16/25 1008   Mon Apr 14, 2025 1951 XR chest 1 view  CHF with pulmonary edema and small right pleural effusion.      Right basilar airspace opacity    [MG]   2007 BNP(!): 2,479 [MG]   2018 CT brain attack head wo IV contrast  No acute intracranial abnormality. [MG]   2018 POTASSIUM: 5.1 [MG]   2018 LIPASE: 27 [MG]   2018 Troponin I, High Sensitivity (CMC): 38 [MG]   2128 Sars-CoV-2, Influenza A/B and RSV PCR [MG]   2143 CT abdomen pelvis wo IV contrast  1. Mild wall thickening involving a segment of small bowel in the  left mid abdomen is nonspecific but may be seen with enteritis. No  evidence of bowel obstruction.  2. Interval enlargement of bilateral pleural effusions, with mild  bibasilar atelectasis.  3. Limited evaluation of the left iliac fossa transplanted kidney  given the lack of IV contrast.  4. Mild ascites.   [MG]   2150 Troponin I, High Sensitivity (CMC)(!): 64 [MG]      ED Course User Index  [MG] Demetria Pisano MD         Diagnoses as of 04/16/25 1008   Shortness of breath   Nausea and vomiting, unspecified vomiting type   Enteritis   Hypertensive emergency   NSTEMI (non-ST elevated myocardial infarction) (Multi)     Disposition   Patient was signed out to Dr. Jaramillo at 2230 pending the remainder of workup, with likely disposition to MICU.    Patient seen and discussed with ED attending physician.    Demetria Pisano MD  Emergency Medicine      Demetria Pisano MD  Resident  04/16/25 1008

## 2025-04-14 NOTE — ED TRIAGE NOTES
BIB EMS with chief complaint of SOB, pt on dialysis but states they got a full treatment yesterday. Pt /120s for EMS, and having copious emesis upon arrival. Pt states they have chest pain and a headache.

## 2025-04-15 ENCOUNTER — PATIENT OUTREACH (OUTPATIENT)
Dept: CARE COORDINATION | Facility: CLINIC | Age: 70
End: 2025-04-15
Payer: COMMERCIAL

## 2025-04-15 ENCOUNTER — CLINICAL SUPPORT (OUTPATIENT)
Dept: EMERGENCY MEDICINE | Facility: HOSPITAL | Age: 70
End: 2025-04-15
Payer: COMMERCIAL

## 2025-04-15 ENCOUNTER — APPOINTMENT (OUTPATIENT)
Dept: CARDIOLOGY | Facility: HOSPITAL | Age: 70
End: 2025-04-15
Payer: COMMERCIAL

## 2025-04-15 PROBLEM — Z99.2 ESRD ON HEMODIALYSIS (MULTI): Status: ACTIVE | Noted: 2025-04-15

## 2025-04-15 PROBLEM — N18.6 ESRD ON HEMODIALYSIS (MULTI): Status: ACTIVE | Noted: 2025-04-15

## 2025-04-15 LAB
ALBUMIN SERPL BCP-MCNC: 3.4 G/DL (ref 3.4–5)
ANION GAP SERPL CALC-SCNC: 16 MMOL/L (ref 10–20)
APTT PPP: 31 SECONDS (ref 26–36)
ATRIAL RATE: 79 BPM
BASOPHILS # BLD AUTO: 0.04 X10*3/UL (ref 0–0.1)
BASOPHILS NFR BLD AUTO: 0.5 %
BUN SERPL-MCNC: 44 MG/DL (ref 6–23)
CALCIUM SERPL-MCNC: 9.3 MG/DL (ref 8.6–10.6)
CARDIAC TROPONIN I PNL SERPL HS: 161 NG/L (ref 0–53)
CARDIAC TROPONIN I PNL SERPL HS: 165 NG/L (ref 0–53)
CARDIAC TROPONIN I PNL SERPL HS: 199 NG/L (ref 0–53)
CHLORIDE SERPL-SCNC: 98 MMOL/L (ref 98–107)
CO2 SERPL-SCNC: 25 MMOL/L (ref 21–32)
CREAT SERPL-MCNC: 10.73 MG/DL (ref 0.5–1.3)
EGFRCR SERPLBLD CKD-EPI 2021: 5 ML/MIN/1.73M*2
EOSINOPHIL # BLD AUTO: 0.19 X10*3/UL (ref 0–0.7)
EOSINOPHIL NFR BLD AUTO: 2.5 %
ERYTHROCYTE [DISTWIDTH] IN BLOOD BY AUTOMATED COUNT: 16.6 % (ref 11.5–14.5)
GLUCOSE BLD MANUAL STRIP-MCNC: 123 MG/DL (ref 74–99)
GLUCOSE BLD MANUAL STRIP-MCNC: 139 MG/DL (ref 74–99)
GLUCOSE BLD MANUAL STRIP-MCNC: 139 MG/DL (ref 74–99)
GLUCOSE BLD MANUAL STRIP-MCNC: 49 MG/DL (ref 74–99)
GLUCOSE SERPL-MCNC: 43 MG/DL (ref 74–99)
HCT VFR BLD AUTO: 29.1 % (ref 41–52)
HGB BLD-MCNC: 9.8 G/DL (ref 13.5–17.5)
IMM GRANULOCYTES # BLD AUTO: 0.05 X10*3/UL (ref 0–0.7)
IMM GRANULOCYTES NFR BLD AUTO: 0.7 % (ref 0–0.9)
INR PPP: 1.2 (ref 0.9–1.1)
LYMPHOCYTES # BLD AUTO: 1.16 X10*3/UL (ref 1.2–4.8)
LYMPHOCYTES NFR BLD AUTO: 15.5 %
MAGNESIUM SERPL-MCNC: 1.78 MG/DL (ref 1.6–2.4)
MCH RBC QN AUTO: 28 PG (ref 26–34)
MCHC RBC AUTO-ENTMCNC: 33.7 G/DL (ref 32–36)
MCV RBC AUTO: 83 FL (ref 80–100)
MONOCYTES # BLD AUTO: 0.9 X10*3/UL (ref 0.1–1)
MONOCYTES NFR BLD AUTO: 12 %
MRSA DNA SPEC QL NAA+PROBE: NOT DETECTED
NEUTROPHILS # BLD AUTO: 5.15 X10*3/UL (ref 1.2–7.7)
NEUTROPHILS NFR BLD AUTO: 68.8 %
NRBC BLD-RTO: 0 /100 WBCS (ref 0–0)
P AXIS: 80 DEGREES
P OFFSET: 191 MS
P ONSET: 134 MS
PHOSPHATE SERPL-MCNC: 3.9 MG/DL (ref 2.5–4.9)
PLATELET # BLD AUTO: 125 X10*3/UL (ref 150–450)
POTASSIUM SERPL-SCNC: 5.2 MMOL/L (ref 3.5–5.3)
PR INTERVAL: 162 MS
PROTHROMBIN TIME: 13.3 SECONDS (ref 9.8–12.4)
Q ONSET: 215 MS
QRS COUNT: 13 BEATS
QRS DURATION: 136 MS
QT INTERVAL: 404 MS
QTC CALCULATION(BAZETT): 463 MS
QTC FREDERICIA: 443 MS
R AXIS: 155 DEGREES
RBC # BLD AUTO: 3.5 X10*6/UL (ref 4.5–5.9)
SODIUM SERPL-SCNC: 134 MMOL/L (ref 136–145)
T AXIS: 27 DEGREES
T OFFSET: 417 MS
VENTRICULAR RATE: 79 BPM
WBC # BLD AUTO: 7.5 X10*3/UL (ref 4.4–11.3)

## 2025-04-15 PROCEDURE — 2500000004 HC RX 250 GENERAL PHARMACY W/ HCPCS (ALT 636 FOR OP/ED)

## 2025-04-15 PROCEDURE — 8010000001 HC DIALYSIS - HEMODIALYSIS PER DAY

## 2025-04-15 PROCEDURE — 2500000001 HC RX 250 WO HCPCS SELF ADMINISTERED DRUGS (ALT 637 FOR MEDICARE OP)

## 2025-04-15 PROCEDURE — 93010 ELECTROCARDIOGRAM REPORT: CPT | Performed by: INTERNAL MEDICINE

## 2025-04-15 PROCEDURE — 36415 COLL VENOUS BLD VENIPUNCTURE: CPT | Performed by: EMERGENCY MEDICINE

## 2025-04-15 PROCEDURE — 1200000002 HC GENERAL ROOM WITH TELEMETRY DAILY

## 2025-04-15 PROCEDURE — 85730 THROMBOPLASTIN TIME PARTIAL: CPT

## 2025-04-15 PROCEDURE — 93005 ELECTROCARDIOGRAM TRACING: CPT

## 2025-04-15 PROCEDURE — 5A1D70Z PERFORMANCE OF URINARY FILTRATION, INTERMITTENT, LESS THAN 6 HOURS PER DAY: ICD-10-PCS

## 2025-04-15 PROCEDURE — 97161 PT EVAL LOW COMPLEX 20 MIN: CPT | Mod: GP | Performed by: STUDENT IN AN ORGANIZED HEALTH CARE EDUCATION/TRAINING PROGRAM

## 2025-04-15 PROCEDURE — 87641 MR-STAPH DNA AMP PROBE: CPT

## 2025-04-15 PROCEDURE — 2500000002 HC RX 250 W HCPCS SELF ADMINISTERED DRUGS (ALT 637 FOR MEDICARE OP, ALT 636 FOR OP/ED)

## 2025-04-15 PROCEDURE — 2500000005 HC RX 250 GENERAL PHARMACY W/O HCPCS

## 2025-04-15 PROCEDURE — 80069 RENAL FUNCTION PANEL: CPT

## 2025-04-15 PROCEDURE — 85025 COMPLETE CBC W/AUTO DIFF WBC: CPT

## 2025-04-15 PROCEDURE — 82947 ASSAY GLUCOSE BLOOD QUANT: CPT

## 2025-04-15 PROCEDURE — 2500000004 HC RX 250 GENERAL PHARMACY W/ HCPCS (ALT 636 FOR OP/ED): Performed by: EMERGENCY MEDICINE

## 2025-04-15 PROCEDURE — 84484 ASSAY OF TROPONIN QUANT: CPT

## 2025-04-15 PROCEDURE — 6350000001 HC RX 635 EPOETIN >10,000 UNITS

## 2025-04-15 PROCEDURE — 83735 ASSAY OF MAGNESIUM: CPT

## 2025-04-15 PROCEDURE — 99291 CRITICAL CARE FIRST HOUR: CPT

## 2025-04-15 PROCEDURE — 85610 PROTHROMBIN TIME: CPT

## 2025-04-15 PROCEDURE — 99223 1ST HOSP IP/OBS HIGH 75: CPT

## 2025-04-15 PROCEDURE — 84484 ASSAY OF TROPONIN QUANT: CPT | Performed by: EMERGENCY MEDICINE

## 2025-04-15 PROCEDURE — 36415 COLL VENOUS BLD VENIPUNCTURE: CPT

## 2025-04-15 RX ORDER — DEXTROSE 50 % IN WATER (D50W) INTRAVENOUS SYRINGE
25
Status: DISCONTINUED | OUTPATIENT
Start: 2025-04-15 | End: 2025-04-16 | Stop reason: HOSPADM

## 2025-04-15 RX ORDER — HEPARIN SODIUM 5000 [USP'U]/ML
5000 INJECTION, SOLUTION INTRAVENOUS; SUBCUTANEOUS EVERY 8 HOURS
Status: DISCONTINUED | OUTPATIENT
Start: 2025-04-15 | End: 2025-04-16 | Stop reason: HOSPADM

## 2025-04-15 RX ORDER — DEXTROSE 50 % IN WATER (D50W) INTRAVENOUS SYRINGE
12.5
Status: DISCONTINUED | OUTPATIENT
Start: 2025-04-15 | End: 2025-04-16 | Stop reason: HOSPADM

## 2025-04-15 RX ORDER — TACROLIMUS 1 MG/G
OINTMENT TOPICAL 2 TIMES DAILY
Status: DISCONTINUED | OUTPATIENT
Start: 2025-04-15 | End: 2025-04-16 | Stop reason: HOSPADM

## 2025-04-15 RX ORDER — DEXTROSE 50 % IN WATER (D50W) INTRAVENOUS SYRINGE
Status: COMPLETED
Start: 2025-04-15 | End: 2025-04-15

## 2025-04-15 RX ORDER — TACROLIMUS 0.5 MG/1
0.5 CAPSULE ORAL 2 TIMES DAILY
Status: DISCONTINUED | OUTPATIENT
Start: 2025-04-15 | End: 2025-04-16 | Stop reason: HOSPADM

## 2025-04-15 RX ORDER — KETOROLAC TROMETHAMINE 5 MG/ML
1 SOLUTION OPHTHALMIC 3 TIMES DAILY
Status: DISCONTINUED | OUTPATIENT
Start: 2025-04-15 | End: 2025-04-16 | Stop reason: HOSPADM

## 2025-04-15 RX ORDER — NICARDIPINE HYDROCHLORIDE 0.2 MG/ML
2.5-15 INJECTION INTRAVENOUS CONTINUOUS
Status: DISCONTINUED | OUTPATIENT
Start: 2025-04-15 | End: 2025-04-15

## 2025-04-15 RX ORDER — PRAVASTATIN SODIUM 20 MG/1
10 TABLET ORAL NIGHTLY
Status: DISCONTINUED | OUTPATIENT
Start: 2025-04-15 | End: 2025-04-16 | Stop reason: HOSPADM

## 2025-04-15 RX ORDER — BENZONATATE 100 MG/1
100 CAPSULE ORAL 3 TIMES DAILY PRN
Status: DISCONTINUED | OUTPATIENT
Start: 2025-04-15 | End: 2025-04-16 | Stop reason: HOSPADM

## 2025-04-15 RX ORDER — AMOXICILLIN AND CLAVULANATE POTASSIUM 500; 125 MG/1; MG/1
1 TABLET, FILM COATED ORAL
Status: COMPLETED | OUTPATIENT
Start: 2025-04-15 | End: 2025-04-16

## 2025-04-15 RX ORDER — ACETAMINOPHEN 325 MG/1
650 TABLET ORAL EVERY 6 HOURS PRN
Status: DISCONTINUED | OUTPATIENT
Start: 2025-04-15 | End: 2025-04-16 | Stop reason: HOSPADM

## 2025-04-15 RX ORDER — CINACALCET 30 MG/1
30 TABLET, FILM COATED ORAL DAILY
Status: DISCONTINUED | OUTPATIENT
Start: 2025-04-15 | End: 2025-04-16 | Stop reason: HOSPADM

## 2025-04-15 RX ORDER — CLOTRIMAZOLE 1 %
CREAM (GRAM) TOPICAL 2 TIMES DAILY
Status: DISCONTINUED | OUTPATIENT
Start: 2025-04-15 | End: 2025-04-16 | Stop reason: HOSPADM

## 2025-04-15 RX ORDER — NIFEDIPINE 90 MG/1
90 TABLET, EXTENDED RELEASE ORAL 2 TIMES DAILY
Status: DISCONTINUED | OUTPATIENT
Start: 2025-04-15 | End: 2025-04-16 | Stop reason: HOSPADM

## 2025-04-15 RX ORDER — NEOMYCIN SULFATE, POLYMYXIN B SULFATE AND DEXAMETHASONE 3.5; 10000; 1 MG/ML; [USP'U]/ML; MG/ML
2 SUSPENSION/ DROPS OPHTHALMIC EVERY MORNING
Status: DISCONTINUED | OUTPATIENT
Start: 2025-04-15 | End: 2025-04-16 | Stop reason: HOSPADM

## 2025-04-15 RX ORDER — ISOSORBIDE MONONITRATE 30 MG/1
60 TABLET, EXTENDED RELEASE ORAL DAILY
Status: DISCONTINUED | OUTPATIENT
Start: 2025-04-15 | End: 2025-04-16

## 2025-04-15 RX ORDER — CARVEDILOL 12.5 MG/1
37.5 TABLET ORAL 2 TIMES DAILY
Status: DISCONTINUED | OUTPATIENT
Start: 2025-04-15 | End: 2025-04-16 | Stop reason: HOSPADM

## 2025-04-15 RX ORDER — ACETAMINOPHEN 325 MG/1
975 TABLET ORAL EVERY 6 HOURS PRN
Status: DISCONTINUED | OUTPATIENT
Start: 2025-04-15 | End: 2025-04-16 | Stop reason: HOSPADM

## 2025-04-15 RX ORDER — SEVELAMER CARBONATE 800 MG/1
800 TABLET, FILM COATED ORAL
Status: DISCONTINUED | OUTPATIENT
Start: 2025-04-15 | End: 2025-04-16 | Stop reason: HOSPADM

## 2025-04-15 RX ORDER — METOCLOPRAMIDE 5 MG/1
5 TABLET ORAL
Status: DISCONTINUED | OUTPATIENT
Start: 2025-04-15 | End: 2025-04-16 | Stop reason: HOSPADM

## 2025-04-15 RX ADMIN — DEXTROSE 50 % IN WATER (D50W) INTRAVENOUS SYRINGE 25 G: at 09:18

## 2025-04-15 RX ADMIN — DEXTROSE MONOHYDRATE 25 G: 25 INJECTION, SOLUTION INTRAVENOUS at 09:18

## 2025-04-15 RX ADMIN — NEOMYCIN SULFATE, POLYMYXIN B SULFATE AND DEXAMETHASONE 2 DROP: 3.5; 10000; 1 SUSPENSION OPHTHALMIC at 14:02

## 2025-04-15 RX ADMIN — NIFEDIPINE 90 MG: 90 TABLET, FILM COATED, EXTENDED RELEASE ORAL at 22:51

## 2025-04-15 RX ADMIN — EPOETIN ALFA 10000 UNITS: 20000 SOLUTION INTRAVENOUS; SUBCUTANEOUS at 21:30

## 2025-04-15 RX ADMIN — SALINE NASAL SPRAY 1 SPRAY: 1.5 SOLUTION NASAL at 14:01

## 2025-04-15 RX ADMIN — HEPARIN SODIUM 5000 UNITS: 5000 INJECTION, SOLUTION INTRAVENOUS; SUBCUTANEOUS at 14:01

## 2025-04-15 RX ADMIN — ACETAMINOPHEN 650 MG: 325 TABLET, FILM COATED ORAL at 12:02

## 2025-04-15 RX ADMIN — ACETAMINOPHEN 975 MG: 325 TABLET, FILM COATED ORAL at 21:59

## 2025-04-15 RX ADMIN — PRAVASTATIN SODIUM 10 MG: 20 TABLET ORAL at 21:29

## 2025-04-15 RX ADMIN — CARVEDILOL 37.5 MG: 12.5 TABLET, FILM COATED ORAL at 08:28

## 2025-04-15 RX ADMIN — BENZONATATE 100 MG: 100 CAPSULE ORAL at 14:01

## 2025-04-15 RX ADMIN — TACROLIMUS 0.5 MG: 0.5 CAPSULE ORAL at 21:29

## 2025-04-15 RX ADMIN — BENZONATATE 100 MG: 100 CAPSULE ORAL at 21:59

## 2025-04-15 RX ADMIN — CLOTRIMAZOLE: 1 CREAM TOPICAL at 14:02

## 2025-04-15 RX ADMIN — NICARDIPINE HYDROCHLORIDE 5 MG/HR: 0.2 INJECTION, SOLUTION INTRAVENOUS at 01:38

## 2025-04-15 RX ADMIN — KETOROLAC TROMETHAMINE 1 DROP: 5 SOLUTION OPHTHALMIC at 14:01

## 2025-04-15 RX ADMIN — HYDROMORPHONE HYDROCHLORIDE 0.5 MG: 0.5 INJECTION, SOLUTION INTRAMUSCULAR; INTRAVENOUS; SUBCUTANEOUS at 00:32

## 2025-04-15 RX ADMIN — HEPARIN SODIUM 5000 UNITS: 5000 INJECTION, SOLUTION INTRAVENOUS; SUBCUTANEOUS at 22:52

## 2025-04-15 RX ADMIN — NITROGLYCERIN 400 MCG/MIN: 20 INJECTION INTRAVENOUS at 00:00

## 2025-04-15 RX ADMIN — NIFEDIPINE 90 MG: 90 TABLET, FILM COATED, EXTENDED RELEASE ORAL at 08:28

## 2025-04-15 RX ADMIN — AMOXICILLIN AND CLAVULANATE POTASSIUM 1 TABLET: 500; 125 TABLET, FILM COATED ORAL at 12:02

## 2025-04-15 RX ADMIN — CLOTRIMAZOLE: 1 CREAM TOPICAL at 22:51

## 2025-04-15 RX ADMIN — ISAVUCONAZONIUM SULFATE 372 MG: 186 CAPSULE ORAL at 12:02

## 2025-04-15 RX ADMIN — CARVEDILOL 37.5 MG: 12.5 TABLET, FILM COATED ORAL at 21:29

## 2025-04-15 RX ADMIN — CINACALCET HYDROCHLORIDE 30 MG: 30 TABLET, FILM COATED ORAL at 14:02

## 2025-04-15 SDOH — ECONOMIC STABILITY: HOUSING INSECURITY: IN THE LAST 12 MONTHS, WAS THERE A TIME WHEN YOU WERE NOT ABLE TO PAY THE MORTGAGE OR RENT ON TIME?: NO

## 2025-04-15 SDOH — SOCIAL STABILITY: SOCIAL INSECURITY: DO YOU FEEL ANYONE HAS EXPLOITED OR TAKEN ADVANTAGE OF YOU FINANCIALLY OR OF YOUR PERSONAL PROPERTY?: NO

## 2025-04-15 SDOH — ECONOMIC STABILITY: INCOME INSECURITY: IN THE PAST 12 MONTHS HAS THE ELECTRIC, GAS, OIL, OR WATER COMPANY THREATENED TO SHUT OFF SERVICES IN YOUR HOME?: NO

## 2025-04-15 SDOH — HEALTH STABILITY: MENTAL HEALTH: HOW OFTEN DO YOU HAVE SIX OR MORE DRINKS ON ONE OCCASION?: NEVER

## 2025-04-15 SDOH — SOCIAL STABILITY: SOCIAL INSECURITY: DO YOU FEEL UNSAFE GOING BACK TO THE PLACE WHERE YOU ARE LIVING?: NO

## 2025-04-15 SDOH — ECONOMIC STABILITY: FOOD INSECURITY: WITHIN THE PAST 12 MONTHS, THE FOOD YOU BOUGHT JUST DIDN'T LAST AND YOU DIDN'T HAVE MONEY TO GET MORE.: NEVER TRUE

## 2025-04-15 SDOH — SOCIAL STABILITY: SOCIAL INSECURITY: WERE YOU ABLE TO COMPLETE ALL THE BEHAVIORAL HEALTH SCREENINGS?: YES

## 2025-04-15 SDOH — SOCIAL STABILITY: SOCIAL INSECURITY: HAVE YOU HAD THOUGHTS OF HARMING ANYONE ELSE?: NO

## 2025-04-15 SDOH — HEALTH STABILITY: MENTAL HEALTH: HOW OFTEN DO YOU HAVE A DRINK CONTAINING ALCOHOL?: NEVER

## 2025-04-15 SDOH — ECONOMIC STABILITY: HOUSING INSECURITY: IN THE PAST 12 MONTHS, HOW MANY TIMES HAVE YOU MOVED WHERE YOU WERE LIVING?: 0

## 2025-04-15 SDOH — ECONOMIC STABILITY: FOOD INSECURITY: HOW HARD IS IT FOR YOU TO PAY FOR THE VERY BASICS LIKE FOOD, HOUSING, MEDICAL CARE, AND HEATING?: NOT HARD AT ALL

## 2025-04-15 SDOH — SOCIAL STABILITY: SOCIAL INSECURITY: ABUSE: ADULT

## 2025-04-15 SDOH — ECONOMIC STABILITY: FOOD INSECURITY: WITHIN THE PAST 12 MONTHS, YOU WORRIED THAT YOUR FOOD WOULD RUN OUT BEFORE YOU GOT THE MONEY TO BUY MORE.: NEVER TRUE

## 2025-04-15 SDOH — ECONOMIC STABILITY: HOUSING INSECURITY: AT ANY TIME IN THE PAST 12 MONTHS, WERE YOU HOMELESS OR LIVING IN A SHELTER (INCLUDING NOW)?: NO

## 2025-04-15 SDOH — HEALTH STABILITY: MENTAL HEALTH: HOW MANY DRINKS CONTAINING ALCOHOL DO YOU HAVE ON A TYPICAL DAY WHEN YOU ARE DRINKING?: PATIENT DOES NOT DRINK

## 2025-04-15 SDOH — SOCIAL STABILITY: SOCIAL INSECURITY: WITHIN THE LAST YEAR, HAVE YOU BEEN HUMILIATED OR EMOTIONALLY ABUSED IN OTHER WAYS BY YOUR PARTNER OR EX-PARTNER?: NO

## 2025-04-15 SDOH — SOCIAL STABILITY: SOCIAL INSECURITY: WITHIN THE LAST YEAR, HAVE YOU BEEN AFRAID OF YOUR PARTNER OR EX-PARTNER?: NO

## 2025-04-15 SDOH — SOCIAL STABILITY: SOCIAL INSECURITY: ARE YOU OR HAVE YOU BEEN THREATENED OR ABUSED PHYSICALLY, EMOTIONALLY, OR SEXUALLY BY ANYONE?: NO

## 2025-04-15 SDOH — SOCIAL STABILITY: SOCIAL INSECURITY: ARE THERE ANY APPARENT SIGNS OF INJURIES/BEHAVIORS THAT COULD BE RELATED TO ABUSE/NEGLECT?: NO

## 2025-04-15 SDOH — SOCIAL STABILITY: SOCIAL INSECURITY: HAS ANYONE EVER THREATENED TO HURT YOUR FAMILY OR YOUR PETS?: NO

## 2025-04-15 SDOH — SOCIAL STABILITY: SOCIAL INSECURITY: DOES ANYONE TRY TO KEEP YOU FROM HAVING/CONTACTING OTHER FRIENDS OR DOING THINGS OUTSIDE YOUR HOME?: NO

## 2025-04-15 SDOH — ECONOMIC STABILITY: TRANSPORTATION INSECURITY: IN THE PAST 12 MONTHS, HAS LACK OF TRANSPORTATION KEPT YOU FROM MEDICAL APPOINTMENTS OR FROM GETTING MEDICATIONS?: NO

## 2025-04-15 ASSESSMENT — ACTIVITIES OF DAILY LIVING (ADL)
ADL_ASSISTANCE: INDEPENDENT
DRESSING YOURSELF: INDEPENDENT
LACK_OF_TRANSPORTATION: NO
TOILETING: INDEPENDENT
BATHING: INDEPENDENT
HEARING - RIGHT EAR: FUNCTIONAL
FEEDING YOURSELF: INDEPENDENT
PATIENT'S MEMORY ADEQUATE TO SAFELY COMPLETE DAILY ACTIVITIES?: YES
ADEQUATE_TO_COMPLETE_ADL: YES
JUDGMENT_ADEQUATE_SAFELY_COMPLETE_DAILY_ACTIVITIES: YES
LACK_OF_TRANSPORTATION: NO
GROOMING: INDEPENDENT
HEARING - LEFT EAR: FUNCTIONAL
WALKS IN HOME: INDEPENDENT

## 2025-04-15 ASSESSMENT — COGNITIVE AND FUNCTIONAL STATUS - GENERAL
CLIMB 3 TO 5 STEPS WITH RAILING: A LITTLE
STANDING UP FROM CHAIR USING ARMS: A LITTLE
MOBILITY SCORE: 18
MOVING FROM LYING ON BACK TO SITTING ON SIDE OF FLAT BED WITH BEDRAILS: A LITTLE
WALKING IN HOSPITAL ROOM: A LITTLE
DAILY ACTIVITIY SCORE: 24
TURNING FROM BACK TO SIDE WHILE IN FLAT BAD: A LITTLE
PATIENT BASELINE BEDBOUND: NO
MOBILITY SCORE: 24
DAILY ACTIVITIY SCORE: 24
MOBILITY SCORE: 24
MOVING TO AND FROM BED TO CHAIR: A LITTLE

## 2025-04-15 ASSESSMENT — PAIN SCALES - GENERAL
PAINLEVEL_OUTOF10: 8
PAINLEVEL_OUTOF10: 0 - NO PAIN
PAINLEVEL_OUTOF10: 10 - WORST POSSIBLE PAIN

## 2025-04-15 ASSESSMENT — PAIN DESCRIPTION - LOCATION: LOCATION: BACK

## 2025-04-15 ASSESSMENT — LIFESTYLE VARIABLES
AUDIT-C TOTAL SCORE: 0
SKIP TO QUESTIONS 9-10: 1

## 2025-04-15 NOTE — PROGRESS NOTES
Patient was handed off to me from the previous team. For full history, physical, and prior ED course, please see previous provider note prior to patient handoff. This is an addendum to the record.    HPI/prior hospital course:   In brief, patient is a 61-year-old male with past medical history of kidney transplant x2 on dialysis, T2DM, HTN, HCV, prostate cancer s/p prostatectomy who presented with shortness of breath.  Concern for SCAPE given systolics in the 260s, fluid overload on chest x-ray, improvement with nitroglycerin.  CT abdomen for belly pain with no acute pathology.  Did have transient concern for altered mental status and activated as BAT but no pathology on CT head.  Handed off on 6 L nasal cannula with improvement in tachypnea, hypoxia with nitroglycerin previously.   Hospital Course/MDM:  Neuro intact on reassessment. Given home blood pressure medications without significant changes in BP.  Concern for fluid overload and need for emergent dialysis given hypoxic failure with fluid overload.  Case discussed with MICU and transplant nephrology with plan for emergent dialysis in MICU.  Did have elevated troponin and persistently elevated blood pressure and decision that benefits of more aggressive management outweighed risks given evidence of possible endorgan damage.  Started on clevidipine, titrating to reduce blood pressure by approximately 25% of blood pressure patient arrived at.  Did have success with nicardipine at 5 mg/h.  Gently weaned off after SBP dropped below 180.  Pressure stable with systolics in the 180s on nitroglycerin @ 400mcg/min and home medicatiosn at time of handoff.  Handed off admitted to MICU given need for continue nitroglycerin drip but still awaiting bed at time of handoff.    Disposition:  Admitted to MICU    Patient seen and discussed with Dr. Vandana Jaramillo MD, PhD  Emergency Medicine PGY3

## 2025-04-15 NOTE — PROGRESS NOTES
ICU to [facility] Transfer Summary     I:  ICU Admission Reason & Brief ICU Course:    69 y.o. 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 difficile (Tx w/ Vanc PO, 2/6/25) presented with nausea, vomiting, and SOB, found to be acutely altered with minimal responses, profound tachypnea to the 50s and hypoxia to low 80s. Neurology consulted, NIHSS 1 for mild dysarthria and difficulty answering LOC questions thus leading to BAT activation. Altered mental status workup largely unremarkable, acute change in mentation thought to be secondary to hypertensive encephalopathy with possible respiratory component. Chest x-ray demonstrated pulmonary edema which was largely driven by hypertensive emergency, Pt treated with nifedipine ER 90 mg, nicardipine and nitroglycerin drip with subsequent improvement in respiratory status and mentation. Patient transferred to the MICU for hypertensive emergency and need for dialysis. Transplant nephrology consulted appreciate recs. Had troponemia, peaked at 199, no CP and EKG without ST changes  this AM. BPs well controlled this morning and stable for floor transfer.        C: Code Status/DPOA Info/Goals of Care/ACP Note    Full Code  DPOA/Contact Number: Amalia Enriquez (Friend)  [phone] (Mobile)    U: Unprescribing & Pertinent High-Risk Medications    Changes to home meds: held antihypertensives I/s/o soft BPs     Anticoagulation: Yes - SQH    Antibiotics:    home Augmentin(1 more day left) and Cresemba(3/5-6/5) for atypical PNA     P: Pending Tests at the Time of Transfer   Nil      A: Active consultants, including Rehab:   []  Subspecialty Consultants: nephrology  [x]  PT  [x]  OT      U: Uncertainty  Measure/Diagnostic Pause:    Working diagnosis at the time of transfer Hypertensive encephalopathy and AHRF 2/2 pulmonary edema/pneumonia, though ddx includes HFpEF exacerbation     Diagnosis Degree of Certainty: 1. High degree of certainty about the clinical diagnosis.     S: Summary of Major Problems and To-Dos:   NEUROLOGY/PSYCH:  #Acute Encephalopathy  #Hypertensive Emergency  -BAT called while in the ED for dysarthria and NIHSS 1, difficulty answering LOC questions however eventually was able to answer appropriately.  -CTH negative for acute intracranial process, no seizure activity noted and presentation thought to be secondary to hypertensive encephalopathy with possible respiratory component.  -Received nifedipine ER 90 mg, nicardipine drip and nitroglycerin drip  -BP on arrival to MICU floor 111/63, 247/134 on initial presentation  -initial neuro c/f right sided weakness but this AM he moves all limbs equally  Plan:  -Hold nitroglycerin drip for now  -Target BP goal: normotension  -Consider repeat CTH if patient develops FND.  -Already had coreg and Nifedipine this AM, holding BP I/s/o soft Bps, resume when appropriate     CARDIOVASCULAR:  #Type II MI   -Etiology: Profound hypotension coupled with anemia of chronic disease secondary to ESRD.  :: ECG on admission with some ST changes in V1-V4  :: Troponin 64 -->161-->199-->165  Plan:  -Maintain on continuous telemetry  -repeat EKG without ST changes        #HFpEF (EF 54% 4/5/2024)  #CAD  #DLD  #HTN   Plan:  -Restart home Coreg 37.5 mg BID, nifedipine 90, and Imdur 60 Mg after HD, when appropriate, already had Coreg and Nifedipine 4/15 AM  -Continue pravastatin 10 mg daily     PULMONARY:  #Acute hypoxic respiratory failure 2/2 Pulmonary edema/CAP  -Patient reports cough, and SOB.  Found to be tachypneic to the 50s and hypoxic to low 80s.  Exam with diffuse crackles.  On 6 L via nasal cannula--> 2L  -CXR:Right basilar airspace opacity may be a combination of  atelectasis and edema.  Cannot exclude PNA.  -Etiology: Pulmonary edema with likely superimposed PNA.  -Received ceftriaxone and azithromycin while in the ED.  -BNP 2479  -TTE(1/2025) EF: 55%, grade 3 diastolic dysfunction  -Was on Cresemba and Augmentin since last admission for suspected atypical PNA  Plan:  -Maintain on continuous pulse ox  -Fluid removal per HD.  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 Bridges, DW 69kg   -Fistulogram  scheduled as outpt, however never completed.  Plan:  -Transplant nephrology consulted, appreciate recs  -Daily AM tacro levels     -c/w Pred and tacro for now  -c/w epoetin aida 5000 10,000 units every week  -c/w Sevelamer 800 mg TID  -c/w Cinacalcet 30 mg daily     GASTROENTEROLOGY:  #Gastroparesis   #Nausea  #Vomiting  -ECG on admission NSR, right axis deviation otherwise no acute ischemic changes.  QTc 463  Plan:  -Continue home metoclopramide 5 mg TID    -Zofran 4 mg as needed Q8h     ENDOCRINOLOGY:  #Type II DM  :: A21C 9.1 (12/2024)  :: Home reg- Lantus 6 units qam, SSI   Plan:  - C/w Lantus insulin 3u, SSI  - POC Glucose ACHS  - Hypoglycemia protocol     HEMATOLOGY:  #Anemia of chronic disease (baseline Hgb 9-10)  -at baseline     MUSCULOSKELETAL/ SKIN:  #BLE Hypopigmentation      INFECTIOUS DISEASE:  #Hx of C. Difficile  #Hx of atypical PNA  #C/f CAP   :: Cdif PCR positive, C dif EIA negative, stool pathogen panel negative, stool O/P negative (01/2025)  :: s/p 10 days PO Vanc (01-02/2025)  :: RVP negative in ED  :: CT A/P without evidence of  acute colonic pathology   :: Histoplasma antigen negative, BD-glucan negative, aspergillus negative, blastomyces abx negative, T spot negative (02-03/2025)  :: C. Diff is negative,   :: Rotavirus positive on recent admission  :: Was on Cresemba and Augmentin since last admission for suspected atypical PNA  Plan:  -See pulmonary section  -Ordered MRSA, Urine strp and legionella Ag  -c/w home Augmentin(1 more day left) and Cresemba(3/5-6/5) for atypical PNA     F: caution  E: K>4 Phos>3 Mag>2  N: Renal diet  A: pIV, double lumen R. Tunneled catheter, LUE AVF  O2: 6L NC  Drips: nil  Abx: Augmentin  DVT PPx: SQH  GI PPx: PPI         E: Exam, including Lines/Drains/Airways & Data Review:   Physical Exam  Constitutional:       Comments: Appeared tired, not in acute distress.   HENT:      Head: Normocephalic and atraumatic.      Nose: Nose normal. No congestion.      Mouth/Throat:      Mouth: Mucous membranes are moist.   Eyes:      Extraocular Movements: Extraocular movements intact.      Pupils: Pupils are equal, round, and reactive to light.   Cardiovascular:      Rate and Rhythm: Normal rate and regular rhythm.      Heart sounds: No murmur heard.  Pulmonary:      Effort: No respiratory distress.      Breath sounds: Rales present. No wheezing.   Chest:      Chest wall: No tenderness.   Abdominal:      General: Bowel sounds are normal. There is no distension.      Palpations: Abdomen is soft.      Tenderness: There is no abdominal tenderness.   Musculoskeletal:         General: No swelling. Normal range of motion.      Right lower leg: No edema.      Left lower leg: No edema.   Skin:     General: Skin is warm and dry.      Capillary Refill: Capillary refill takes less than 2 seconds.      Coloration: Skin is not jaundiced.      Findings: No bruising or erythema.   Neurological:      Mental Status: He is oriented to person, place, and time.      Cranial Nerves: Cranial nerves 2-12 are intact. No dysarthria or facial  asymmetry.      Sensory: Sensation is intact.      Motor: No weakness.   Psychiatric:         Mood and Affect: Mood normal.   Difficult airway? No  Lines/drains assessed for removal? No    Within 30 minutes of the patient physically leaving the floor, a Floor Readiness Note needs to be placed with updated vitals.

## 2025-04-15 NOTE — PROGRESS NOTES
Occupational Therapy                 Therapy Communication Note    Patient Name: Manolo Bashir  MRN: [id number]  Department: [facility]  Room: 02/02-A  Today's Date: 4/15/2025     Discipline: Occupational Therapy          Missed Visit Reason: Missed Visit Reason: Patient refused (will reattempt as appropriate)    Missed Time: Attempt    Comment:

## 2025-04-15 NOTE — SIGNIFICANT EVENT
Floor Readiness Note       I, personally, evaluated Manolo Bashir prior to transfer to the floor, including reviewing all current laboratory and imaging studies. The patient remains appropriate for transfer to the floor. Bedside nurse and respiratory therapy are also in agreement of patient's readiness for the floor.     Brief summary:  Manolo Bashir is a 69 y.o. 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 difficile (Tx w/ Vanc PO, 2/6/25) presented with nausea, vomiting, and SOB, found to be acutely altered with minimal responses, profound tachypnea to the 50s and hypoxia to low 80s. Neurology consulted, NIHSS 1 for mild dysarthria and difficulty answering LOC questions thus leading to BAT activation. Altered mental status workup largely unremarkable, acute change in mentation thought to be secondary to hypertensive encephalopathy with possible respiratory component. Chest x-ray demonstrated pulmonary edema which was largely driven by hypertensive emergency, Pt treated with nifedipine ER 90 mg, nicardipine and nitroglycerin drip with subsequent improvement in respiratory status and mentation. Patient transferred to the MICU for hypertensive emergency and need for dialysis. Transplant nephrology consulted appreciate recs. Had troponemia, peaked at 199, no CP and EKG without ST changes this AM. BPs well controlled this morning and stable for floor transfer.     Updated focused Physical Exam:  Physical Exam  Constitutional:       Comments: Appeared tired, not in acute distress.   HENT:      Head: Normocephalic and atraumatic.      Nose: Nose normal. No congestion.      Mouth/Throat:      Mouth: Mucous membranes are moist.    Eyes:      Extraocular Movements: Extraocular movements intact.      Pupils: Pupils are equal, round, and reactive to light.   Cardiovascular:      Rate and Rhythm: Normal rate and regular rhythm.      Heart sounds: No murmur heard.  Pulmonary:      Effort: No respiratory distress.      Breath sounds: Rales present. No wheezing.   Chest:      Chest wall: No tenderness.   Abdominal:      General: Bowel sounds are normal. There is no distension.      Palpations: Abdomen is soft.      Tenderness: There is no abdominal tenderness.   Musculoskeletal:         General: No swelling. Normal range of motion.      Right lower leg: No edema.      Left lower leg: No edema.   Skin:     General: Skin is warm and dry.      Capillary Refill: Capillary refill takes less than 2 seconds.      Coloration: Skin is not jaundiced.      Findings: No bruising or erythema.   Neurological:      Mental Status: He is oriented to person, place, and time.      Cranial Nerves: Cranial nerves 2-12 are intact. No dysarthria or facial asymmetry.      Sensory: Sensation is intact.      Motor: No weakness.   Psychiatric:         Mood and Affect: Mood normal.     Current Vital Signs:  Heart Rate: 64 (04/15/25 1700 : Iván Lieberman)  BP: 115/59 (04/15/25 1630 : Elvi Lozada RN)  Temp: 36.2 °C (97.2 °F) (04/15/25 1700 : Iván Lieberman)  Resp: 20 (04/15/25 1630 : Elvi Lozada RN)  SpO2: 99 % (04/15/25 1630 : Elvi Lozada RN)    Relevant updates since rounds:  None     Accepting team, Hospitalist SAMANTHA, received verbal sign out and the Provider Care team/Attending has been updated. Bedside nurse will now call accepting nurse for report and patient will be transferred to [place] [id number].    Chavez Goins MD

## 2025-04-15 NOTE — PROGRESS NOTES
04/15/25 1156   Discharge Planning   Living Arrangements Spouse/significant other   Support Systems Spouse/significant other;Children   Assistance Needed independent prior to admission   Type of Residence Private residence   Home or Post Acute Services None   Expected Discharge Disposition Home   Does the patient need discharge transport arranged? Yes   RoundTrip coordination needed? Yes   Has discharge transport been arranged? No   Financial Resource Strain   How hard is it for you to pay for the very basics like food, housing, medical care, and heating? Not hard   Housing Stability   In the last 12 months, was there a time when you were not able to pay the mortgage or rent on time? N   In the past 12 months, how many times have you moved where you were living? 0   At any time in the past 12 months, were you homeless or living in a shelter (including now)? N   Transportation Needs   In the past 12 months, has lack of transportation kept you from medical appointments or from getting medications? no   In the past 12 months, has lack of transportation kept you from meetings, work, or from getting things needed for daily living? No     NOK: Children   DME: denied  Home Care: denied  HD: [facility]  PCP: Brennen  Additional information:  SW met with patient and SO at bedside for assessment. Patient was asleep. SO reports that they live together at home. He is typically independent without needs at baseline. She confirmed he is able to attend HD without issue. No reported issues with SDOH when prompted. Currently denied needs for social work. Social work to follow.   Iraida Hamlin, MSW, [place]-S ([id number])

## 2025-04-15 NOTE — ED PROCEDURE NOTE
Procedure  Critical Care    Performed by: Jaspreet Ross MD  Authorized by: Jaspreet Ross MD    Critical care provider statement:     Critical care time (minutes):  62    Critical care time was exclusive of:  Separately billable procedures and treating other patients and teaching time    Critical care was necessary to treat or prevent imminent or life-threatening deterioration of the following conditions:  Respiratory failure and CNS failure or compromise    Critical care was time spent personally by me on the following activities:  Blood draw for specimens, development of treatment plan with patient or surrogate, evaluation of patient's response to treatment, discussions with consultants, examination of patient, obtaining history from patient or surrogate, review of old charts, re-evaluation of patient's condition, pulse oximetry, ordering and review of radiographic studies, ordering and review of laboratory studies and ordering and performing treatments and interventions             Jaspreet Ross MD  04/14/25 2352       Jaspreet Ross MD  04/15/25 0002     Soolantra Counseling: I discussed with the patients the risks of topial Soolantra. This is a medicine which decreases the number of mites and inflammation in the skin. You experience burning, stinging, eye irritation or allergic reactions.  Please call our office if you develop any problems from using this medication.

## 2025-04-15 NOTE — SIGNIFICANT EVENT
- Mr. 70 yo male with PHMx of ESRD s/p renal trasnplant on dialysis, DM, HTN, prostate CA. Recent admissions for hypotension, urgent dialysis, and electrolyte disturbance.     He cam to ED today for cough, SOB, HA. BP on arrival ~ 1848 was  240/120 per EMS.     BAT Called 1938 for AMS, pt seen by RN 20 minutes prior to BAT and was oriented. On our arrival, NIHSS was 1 for mild dysarthria. He needed repeated questioning LOC questions but eventually got them. BP was 207/117. . BNP 2479. Pt denied headache. There was no seizure like activity noticed.   CTH done which on our read and radiology resident is negative for acute intracranial insult     Discussed with ED that presentation is not suggestive for an acute neurovascular insult and BAT will be canceled. Hypertensive encephalopathy along with possible respiratory component is likely. ED team in agreement.     Kenzie Tobias  PGY3 Neurology

## 2025-04-15 NOTE — PROGRESS NOTES
At 30 days post-admission, the patient was readmitted to [facility] ICU.  Unable to complete follow up call at this time

## 2025-04-15 NOTE — H&P
MEDICAL INTENSIVE CARE UNIT  ADMISSION H&P    Subjective   HPI:  Manolo Bashir is a 69 y.o. male w/PMHx of ESRD s/p Renal transplants x2, non-functioning: initially in 1992 c/b allograft failure 2006, 2nd transplant 2013, c/b impaired allograft function, currently on iHD since May 2024(TTS); on tacrolimus and prednisone 5mg, MGUS, DMII (A1c 9.1% 12/16/24), HTN, prostate cancer s/p radical prostatectomy, HCV S/p SVR (treated w/ Harvoni, HCV PCR negative 2019), Recent history of C. diff (treated w/ vancomycin PO 10 day course EOT 2/6/25) presented to  ED with chief complaint of nausea, vomiting, chest pain and headache.    In the ED, vitals as follows: 247/134, 109, 40, 36.9 °C.  Initial labs as follows: Hgb 11.9 (baseline Hgb 9-10), platelets 122, , BUN 39, creatinine 10.72, BNP 2479, INR 1.1, troponin 64 and lipase 27.  CT abdomen pelvis without IV con demonstrated mild wall thickening involving small bowel in the left midabdomen concerning for enteritis, no evidence of bowel obstruction, interval enlargement of bilateral pleural effusions with mild bibasilar atelectasis, and mild ascites.  CT brain attack head without IV con without acute intracranial abnormality.  CXR demonstrated right basilar airspace opacity (combination of opacity and edema) concerning for possible pneumonia.    While in the ED a BAT was called at 1938 for altered mental status.  Prior to BAT, triage RN reports patient being oriented.  Neurology evaluated patient, NIHSS was 1 for mild dysarthria and had difficulty answering LOC question.  No seizure-like activity noted, CT head done and without any acute intracranial abnormality.  Presentation was not suggestive of neurovascular insult and that was canceled.  Altered mental status was attributed to hypertensive encephalopathy along with possible respiratory failure.    Patient was recently admitted to [facility] from 3/19-3/26/2025 after he was found to be , hypotensive , then transferred  "to MICU for urgent dialysis due to stenosed fistula and hyperkalemia (7.1 ). Additionally he complained of CP, and ECG demonstrated ST elevations in V2/V3. Patient with multiple admissions this year With similar presentations on 1/26/2025, 2/5/2025, 3/6/2025.    Upon arrival to the floor, vital signs as follows: 111/63, 72, 20, 36.3 °C.  ANO x 2, oriented to person and place but not time.  Daughter at bedside during initial evaluation.  Patient demonstrated difficulty answering questions requiring multiple questioning, and difficulty with recollection of the events.  He reports headaches, lightheadedness, nausea , vomiting with acute onset this morning, and weakness.  Chest pain, cough, SOB, and abdominal pain.  He denies any diplopia, blurry vision, fever, chills, changes in diet, and pruritus.  Patient denies missing any HD sessions, last reported dialysis was this past Saturday.  Reports adherence to medications and confirmed with daughter at bedside.  SBP at home at 170 and normal DBP.  Reports taking isosorbide and nifedipine as directed however only takes carvedilol if SBP is greater than 140.      Results from last 7 days   Lab Units 04/15/25  0630 04/14/25  1906   WBC AUTO x10*3/uL 7.5 7.1   HEMOGLOBIN g/dL 9.8* 11.9*   HEMATOCRIT % 29.1* 36.2*   PLATELETS AUTO x10*3/uL 125* 122*            Results from last 7 days   Lab Units 04/15/25  0630 04/14/25  1906   SODIUM mmol/L 134* 134*   POTASSIUM mmol/L 5.2 5.1   CO2 mmol/L 25 26   ANION GAP mmol/L 16 15   BUN mg/dL 44* 39*   CREATININE mg/dL 10.73* 10.72*   GLUCOSE mg/dL 43* 101*   EGFR mL/min/1.73m*2 5* 5*   MAGNESIUM mg/dL 1.78 1.98   PHOSPHORUS mg/dL 3.9 3.9      Results from last 7 days   Lab Units 04/14/25  1906   ALT U/L 26   AST U/L 30   ALK PHOS U/L 109      Results from last 7 days   Lab Units 04/15/25  0630 04/14/25 2052   INR  1.2* 1.1            No lab exists for component: \"BNPRESU\"  Results from last 7 days   Lab Units 04/14/25  1906   LIPASE " U/L 27          Medical History:  Past Medical History:   Diagnosis Date    Anemia     Arthritis     Cataract     Chronic kidney disease, stage 3 unspecified (Multi) 09/26/2018    Stage 3 chronic kidney disease    CKD (chronic kidney disease)     stage V    Cough 02/12/2024    COVID-19 06/18/2020    COVID-19 virus infection    Diabetes (Multi)     ESRD (end stage renal disease) (Multi)     Focal and segmental proliferative glomerulonephritis 12/23/2023    HTN (hypertension)     Hyperlipidemia     Other long term (current) drug therapy 07/20/2021    High risk medication use    Personal history of other diseases of the circulatory system     Personal history of cardiac murmur    Personal history of other infectious and parasitic diseases 08/17/2015    History of hepatitis    Polyp, colonic 08/17/2023    Primary osteoarthritis of both ankles 08/17/2023    Prostate cancer (Multi)     Tubular adenoma of colon 08/17/2023    Unspecified kidney failure 08/17/2016    Renal failure       No Known Allergies:    Past Surgical History:   Procedure Laterality Date    ILEOSTOMY  04/25/2017    Ileostomy    ILEOSTOMY CLOSURE  08/17/2015    Ileostomy Closure    OTHER SURGICAL HISTORY  04/21/2017    Right Hemicolectomy    OTHER SURGICAL HISTORY  08/17/2015    Arteriovenous Surgery Creation Of A-V Fistula    OTHER SURGICAL HISTORY  08/17/2015    Sigmoidoscopy (Fiberoptic, Therapeutic )    PROSTATECTOMY  10/11/2013    Prostatectomy Radical    TRANSPLANT, KIDNEY, OPEN  1992    TRANSPLANT, KIDNEY, OPEN  2013    US GUIDED PERCUTANEOUS BIOPSY RENAL LEFT Left 11/20/2023    US GUIDED PERCUTANEOUS BIOPSY RENAL LEFT 11/20/2023 Lou Rodgers MD [facility]    US GUIDED PERCUTANEOUS PERITONEAL OR RETROPERITONEAL FLUID COLLECTION DRAINAGE  10/20/2022    US GUIDED PERCUTANEOUS PERITONEAL OR RETROPERITONEAL FLUID COLLECTION DRAINAGE 10/20/2022 [facility] CLINICAL LEGACY   :    Family History   Problem Relation Name Age of Onset    Bone cancer Mother      Other  "(corona's sarcome of the bone marrow) Mother      Prostate cancer Father      Diabetes Other Family Hist     Hypertension Other Family Hist    :           Objective   Blood pressure 114/65, pulse 64, temperature 36.4 °C (97.5 °F), temperature source Temporal, resp. rate 21, height 1.727 m (5' 7.99\"), weight 68.9 kg (151 lb 14.4 oz), SpO2 97%.    VENT SETTINGS:    Most Recent Range Past 24hrs   Mode      FiO2   No data recorded   Rate   No data recorded   Vt    No data recorded   PEEP   No data recorded     I/O's LAST 3 SHIFT:  I/O last 3 completed shifts:  In: 924 (13.4 mL/kg) [I.V.:924 (13.4 mL/kg)]  Out: 2 (0 mL/kg) [Emesis/NG output:2]  Weight: 68.9 kg     PHYSICAL EXAM:  Physical Exam  Constitutional:       Comments: Appeared tired, not in acute distress.   HENT:      Head: Normocephalic and atraumatic.      Nose: Nose normal. No congestion.      Mouth/Throat:      Mouth: Mucous membranes are moist.   Eyes:      Extraocular Movements: Extraocular movements intact.      Pupils: Pupils are equal, round, and reactive to light.   Cardiovascular:      Rate and Rhythm: Normal rate and regular rhythm.      Heart sounds: No murmur heard.  Pulmonary:      Effort: No respiratory distress.      Breath sounds: Rales present. No wheezing.   Chest:      Chest wall: No tenderness.   Abdominal:      General: Bowel sounds are normal. There is no distension.      Palpations: Abdomen is soft.      Tenderness: There is no abdominal tenderness.   Musculoskeletal:         General: No swelling. Normal range of motion.      Right lower leg: No edema.      Left lower leg: No edema.   Skin:     General: Skin is warm and dry.      Capillary Refill: Capillary refill takes less than 2 seconds.      Coloration: Skin is not jaundiced.      Findings: No bruising or erythema.   Neurological:      Mental Status: He is oriented to person, place, and time.      Cranial Nerves: Cranial nerves 2-12 are intact. No dysarthria or facial asymmetry.      " Sensory: Sensation is intact.      Motor: No weakness.   Psychiatric:         Mood and Affect: Mood normal.          LABS:    Lab Results   Component Value Date    WBC 7.5 04/15/2025    HGB 9.8 (L) 04/15/2025    HCT 29.1 (L) 04/15/2025    MCV 83 04/15/2025     (L) 04/15/2025      Hemoglobin   Date Value Ref Range Status   04/15/2025 9.8 (L) 13.5 - 17.5 g/dL Final   04/14/2025 11.9 (L) 13.5 - 17.5 g/dL Final   03/26/2025 9.6 (L) 13.5 - 17.5 g/dL Final   03/25/2025 9.7 (L) 13.5 - 17.5 g/dL Final   03/24/2025 9.5 (L) 13.5 - 17.5 g/dL Final        Lab Results   Component Value Date    CREATININE 10.73 (H) 04/15/2025    BUN 44 (H) 04/15/2025     (L) 04/15/2025    K 5.2 04/15/2025    CL 98 04/15/2025    CO2 25 04/15/2025      Glucose   Date Value Ref Range Status   04/15/2025 43 (LL) 74 - 99 mg/dL Final      Creatinine   Date Value Ref Range Status   04/15/2025 10.73 (H) 0.50 - 1.30 mg/dL Final   04/14/2025 10.72 (H) 0.50 - 1.30 mg/dL Final   03/23/2025 7.46 (H) 0.50 - 1.30 mg/dL Final   03/22/2025 11.62 (H) 0.50 - 1.30 mg/dL Final   03/21/2025 8.57 (H) 0.50 - 1.30 mg/dL Final      Lab Results   Component Value Date    CALCIUM 9.3 04/15/2025    PHOS 3.9 04/15/2025      Magnesium   Date Value Ref Range Status   04/15/2025 1.78 1.60 - 2.40 mg/dL Final      Lab Results   Component Value Date    ALT 26 04/14/2025    AST 30 04/14/2025    ALKPHOS 109 04/14/2025    BILITOT 0.7 04/14/2025      Lab Results   Component Value Date    INR 1.2 (H) 04/15/2025    INR 1.1 04/14/2025    INR 1.3 (H) 09/11/2024    PROTIME 13.3 (H) 04/15/2025    PROTIME 12.5 (H) 04/14/2025    PROTIME 15.0 (H) 09/11/2024        IMAGING:  === 09/10/24 ===    US THORACENTESIS    - Impression -  Uneventful thoracentesis, as detailed above. Right Pleural space, 750  mL    I personally performed and/or directly supervised this study and was  present for the entire procedure.    Performed and dictated at [facility]  [facility].    Signed by: Aamir Thacker 9/12/2024 4:23 PM  Dictation workstation:   [id number]  === 04/14/25 ===    CT ABDOMEN PELVIS WO IV CONTRAST    - Impression -  1. Mild wall thickening involving a segment of small bowel in the  left mid abdomen is nonspecific but may be seen with enteritis. No  evidence of bowel obstruction.  2. Interval enlargement of bilateral pleural effusions, with mild  bibasilar atelectasis.  3. Limited evaluation of the left iliac fossa transplanted kidney  given the lack of IV contrast.  4. Mild ascites.      I personally reviewed the images/study and I agree with the findings  as stated. This study was interpreted at [facility], [place], [place].    MACRO:  None.    Signed by: Fatimah Hogan 4/14/2025 9:39 PM  Dictation workstation:   [id number]    MICRO:  No results found for the last 90 days.            Assessment/Plan   69 y.o. male  w/ PMHx of ESRD s/p DDKT x2, non-functioning, 1992  then c/b allograft failure 2006, 2nd Transplant 2013, c/b impaired allograft function, on tacrolimus and prednisone ), iHD (since May 2024, TTS), MGUS, Type II DM (A1c 9.1% 12/16/24), HTN, Prostate CA s/p radical prostatectomy, HCV S/p SVR (Tx w/ Harvoni, HCV PCR - 2019), Hx of C. difficile (Tx w/ Vanc PO, 2/6/25) presented with nausea, vomiting, and SOB, found to be acutely altered with minimal responses, profound tachypnea to the 50s and hypoxia to low 80s.  Neurology consulted, NIHSS 1 for mild dysarthria and difficulty answering LOC questions thus leading to BAT activation.  Altered mental status workup largely unremarkable, acute change in mentation thought to be secondary to hypertensive encephalopathy with possible respiratory component.  Chest x-ray demonstrated pulmonary edema which was largely driven by hypertensive emergency, Pt treated with nifedipine ER 90 mg, nicardipine and nitroglycerin drip with subsequent improvement in respiratory  status and mentation.  Patient transferred to the MICU for hypertensive emergency and need for dialysis.  Transplant nephrology consulted appreciate recs.    NEUROLOGY/PSYCH:  #Acute Encephalopathy  #Hypertensive Emergency  -BAT called while in the ED for dysarthria and NIHSS 1, difficulty answering LOC questions however eventually was able to answer appropriately.  -CTH negative for acute intracranial process, no seizure activity noted and presentation thought to be secondary to hypertensive encephalopathy with possible respiratory component.  -Received nifedipine ER 90 mg, nicardipine drip and nitroglycerin drip  -BP on arrival to MICU floor 111/63, 247/134 on initial presentation  -initial neuro c/f right sided weakness but this AM he moves all limbs equally  Plan:  -Hold nitroglycerin drip for now  -Target BP goal: normotension  -Consider repeat CTH if patient develops FND.  -Already had coreg and Nifedipine this AM, holding BP I/s/o soft Bps, resume when appropriate    CARDIOVASCULAR:  #Type II MI   -Etiology: Profound hypotension coupled with anemia of chronic disease secondary to ESRD.  :: ECG on admission with some ST changes in V1-V4  :: Troponin 64 -->161-->199-->165  Plan:  -Maintain on continuous telemetry  -repeat EKG without ST changes       #HFpEF (EF 54% 4/5/2024)  #CAD  #DLD  #HTN   Plan:  -Restart home Coreg 37.5 mg BID, nifedipine 90, and Imdur 60 Mg after HD, when appropriate, already had Coreg and Nifedipine 4/15 AM  -Continue pravastatin 10 mg daily    PULMONARY:  #Acute hypoxic respiratory failure 2/2 Pulmonary edema/CAP  -Patient reports cough, and SOB.  Found to be tachypneic to the 50s and hypoxic to low 80s.  Exam with diffuse crackles.  On 6 L via nasal cannula--> 2L  -CXR:Right basilar airspace opacity may be a combination of atelectasis and edema.  Cannot exclude PNA.  -Etiology: Pulmonary edema with likely superimposed PNA.  -Received ceftriaxone and azithromycin while in the ED.  -BNP  2479  -TTE(1/2025) EF: 55%, grade 3 diastolic dysfunction  -Was on Cresemba and Augmentin since last admission for suspected atypical PNA  Plan:  -Maintain on continuous pulse ox  -Fluid removal per HD.  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 Bridges, DW 69kg   -Fistulogram  scheduled as outpt, however never completed.  Plan:  -Transplant nephrology consulted, appreciate recs  -Daily AM tacro levels     -c/w Pred and tacro for now  -c/w epoetin aida 5000 10,000 units every week  -c/w Sevelamer 800 mg TID  -c/w Cinacalcet 30 mg daily    GASTROENTEROLOGY:  #Gastroparesis   #Nausea  #Vomiting  -ECG on admission NSR, right axis deviation otherwise no acute ischemic changes.  QTc 463  Plan:  -Continue home metoclopramide 5 mg TID    -Zofran 4 mg as needed Q8h     ENDOCRINOLOGY:  #Type II DM  :: A21C 9.1 (12/2024)  :: Home reg- Lantus 6 units qam, SSI   Plan:  - C/w Lantus insulin 3u, SSI  - POC Glucose ACHS  - Hypoglycemia protocol     HEMATOLOGY:  #Anemia of chronic disease (baseline Hgb 9-10)  -at baseline    MUSCULOSKELETAL/ SKIN:  #BLE Hypopigmentation      INFECTIOUS DISEASE:  #Hx of C. Difficile  #Hx of atypical PNA  #C/f CAP   :: Cdif PCR positive, C dif EIA negative, stool pathogen panel negative, stool O/P negative (01/2025)  :: s/p 10 days PO Vanc (01-02/2025)  :: RVP negative in ED  :: CT A/P without evidence of acute colonic pathology   :: Histoplasma antigen negative, BD-glucan negative, aspergillus negative, blastomyces abx negative, T spot negative (02-03/2025)  :: C. Diff is  negative,   :: Rotavirus positive on recent admission  :: Was on Cresemba and Augmentin since last admission for suspected atypical PNA  Plan:  -See pulmonary section  -Ordered MRSA, Urine strp and legionella Ag  -c/w home Augmentin(1 more day left) and Cresemba(3/5-6/5) for atypical PNA    F: caution  E: K>4 Phos>3 Mag>2  N: Renal diet  A: pIV, double lumen R. Tunneled catheter, LUE AVF  O2: 6L NC  Drips: nil  Abx: Augmentin  DVT PPx: SQH  GI PPx: PPI    CODE STATUS: FULL (confirmed with on admission)  SURROGATE DECISION MAKER:     Amalia Enriquez (Friend)  [phone] (Mobile       Narayan Chauhan MD  Resident, PGY-1

## 2025-04-15 NOTE — H&P
MEDICAL INTENSIVE CARE UNIT  ADMISSION H&P    Subjective   HPI:  Manolo Bashir is a 69 y.o. male w/PMHx of ESRD s/p Renal transplants x2, non-functioning: initially in 1992 c/b allograft failure 2006, 2nd transplant 2013, c/b impaired allograft function, currently on iHD since May 2024(TTS); on tacrolimus and prednisone 5mg, MGUS, DMII (A1c 9.1% 12/16/24), HTN, prostate cancer s/p radical prostatectomy, HCV S/p SVR (treated w/ Harvoni, HCV PCR negative 2019), Recent history of C. diff (treated w/ vancomycin PO 10 day course EOT 2/6/25) presented to  ED with chief complaint of nausea, vomiting, chest pain and headache.    In the ED, vitals as follows: 247/134, 109, 40, 36.9 °C.  Initial labs as follows: Hgb 11.9 (baseline Hgb 9-10), platelets 122, , BUN 39, creatinine 10.72, BNP 2479, INR 1.1, troponin 64 and lipase 27.  CT abdomen pelvis without IV con demonstrated mild wall thickening involving small bowel in the left midabdomen concerning for enteritis, no evidence of bowel obstruction, interval enlargement of bilateral pleural effusions with mild bibasilar atelectasis, and mild ascites.  CT brain attack head without IV con without acute intracranial abnormality.  CXR demonstrated right basilar airspace opacity (combination of opacity and edema) concerning for possible pneumonia.    While in the ED a BAT was called at 1938 for altered mental status.  Prior to BAT, triage RN reports patient being oriented.  Neurology evaluated patient, NIHSS was 1 for mild dysarthria and had difficulty answering LOC question.  No seizure-like activity noted, CT head done and without any acute intracranial abnormality.  Presentation was not suggestive of neurovascular insult and that was canceled.  Altered mental status was attributed to hypertensive encephalopathy along with possible respiratory failure.    Patient was recently admitted to [facility] from 3/19-3/26/2025 after he was found to be , hypotensive , then transferred  "to MICU for urgent dialysis due to stenosed fistula and hyperkalemia (7.1 ). Additionally he complained of CP, and ECG demonstrated ST elevations in V2/V3. Patient with multiple admissions this year With similar presentations on 1/26/2025, 2/5/2025, 3/6/2025.    Upon arrival to the floor, vital signs as follows: 111/63, 72, 20, 36.3 °C.  ANO x 2, oriented to person and place but not time.  Daughter at bedside during initial evaluation.  Patient demonstrated difficulty answering questions requiring multiple questioning, and difficulty with recollection of the events.  He reports headaches, lightheadedness, nausea , vomiting with acute onset this morning, and weakness.  Chest pain, cough, SOB, and abdominal pain.  He denies any diplopia, blurry vision, fever, chills, changes in diet, and pruritus.  Patient denies missing any HD sessions, last reported dialysis was this past Saturday.  Reports adherence to medications and confirmed with daughter at bedside.  SBP at home at 170 and normal DBP.  Reports taking isosorbide and nifedipine as directed however only takes carvedilol if SBP is greater than 140.      Results from last 7 days   Lab Units 04/14/25  1906   WBC AUTO x10*3/uL 7.1   HEMOGLOBIN g/dL 11.9*   HEMATOCRIT % 36.2*   PLATELETS AUTO x10*3/uL 122*            Results from last 7 days   Lab Units 04/14/25  1906   SODIUM mmol/L 134*   POTASSIUM mmol/L 5.1   CO2 mmol/L 26   ANION GAP mmol/L 15   BUN mg/dL 39*   CREATININE mg/dL 10.72*   GLUCOSE mg/dL 101*   EGFR mL/min/1.73m*2 5*   MAGNESIUM mg/dL 1.98   PHOSPHORUS mg/dL 3.9      Results from last 7 days   Lab Units 04/14/25  1906   ALT U/L 26   AST U/L 30   ALK PHOS U/L 109      Results from last 7 days   Lab Units 04/14/25 2052   INR  1.1            No lab exists for component: \"BNPRESU\"  Results from last 7 days   Lab Units 04/14/25  1906   LIPASE U/L 27          Medical History:  Past Medical History:   Diagnosis Date    Anemia     Arthritis     Cataract     " Chronic kidney disease, stage 3 unspecified (Multi) 09/26/2018    Stage 3 chronic kidney disease    CKD (chronic kidney disease)     stage V    Cough 02/12/2024    COVID-19 06/18/2020    COVID-19 virus infection    Diabetes (Multi)     ESRD (end stage renal disease) (Multi)     Focal and segmental proliferative glomerulonephritis 12/23/2023    HTN (hypertension)     Hyperlipidemia     Other long term (current) drug therapy 07/20/2021    High risk medication use    Personal history of other diseases of the circulatory system     Personal history of cardiac murmur    Personal history of other infectious and parasitic diseases 08/17/2015    History of hepatitis    Polyp, colonic 08/17/2023    Primary osteoarthritis of both ankles 08/17/2023    Prostate cancer (Multi)     Tubular adenoma of colon 08/17/2023    Unspecified kidney failure 08/17/2016    Renal failure       No Known Allergies:    Past Surgical History:   Procedure Laterality Date    ILEOSTOMY  04/25/2017    Ileostomy    ILEOSTOMY CLOSURE  08/17/2015    Ileostomy Closure    OTHER SURGICAL HISTORY  04/21/2017    Right Hemicolectomy    OTHER SURGICAL HISTORY  08/17/2015    Arteriovenous Surgery Creation Of A-V Fistula    OTHER SURGICAL HISTORY  08/17/2015    Sigmoidoscopy (Fiberoptic, Therapeutic )    PROSTATECTOMY  10/11/2013    Prostatectomy Radical    TRANSPLANT, KIDNEY, OPEN  1992    TRANSPLANT, KIDNEY, OPEN  2013    US GUIDED PERCUTANEOUS BIOPSY RENAL LEFT Left 11/20/2023    US GUIDED PERCUTANEOUS BIOPSY RENAL LEFT 11/20/2023 [facility]    US GUIDED PERCUTANEOUS PERITONEAL OR RETROPERITONEAL FLUID COLLECTION DRAINAGE  10/20/2022    US GUIDED PERCUTANEOUS PERITONEAL OR RETROPERITONEAL FLUID COLLECTION DRAINAGE 10/20/2022 [facility] CLINICAL LEGACY   :    Family History   Problem Relation Name Age of Onset    Bone cancer Mother      Other (corona's sarcome of the bone marrow) Mother      Prostate cancer Father      Diabetes Other Family Hist      "Hypertension Other Family Hist    :           Objective   Blood pressure (!) 238/120, pulse (!) 101, temperature 37 °C (98.6 °F), resp. rate (!) 25, height 1.727 m (5' 7.99\"), weight 68.9 kg (151 lb 14.4 oz), SpO2 94%.    VENT SETTINGS:    Most Recent Range Past 24hrs   Mode      FiO2   No data recorded   Rate   No data recorded   Vt    No data recorded   PEEP   No data recorded     I/O's LAST 3 SHIFT:  No intake/output data recorded.    PHYSICAL EXAM:  Physical Exam  Constitutional:       Comments: Appeared tired, not in acute distress.   HENT:      Head: Normocephalic and atraumatic.      Nose: Nose normal. No congestion.      Mouth/Throat:      Mouth: Mucous membranes are moist.   Eyes:      Extraocular Movements: Extraocular movements intact.      Pupils: Pupils are equal, round, and reactive to light.      Comments: Mild redness of both eyes    Cardiovascular:      Rate and Rhythm: Tachycardia present.      Heart sounds: Murmur heard.   Pulmonary:      Effort: No respiratory distress.      Breath sounds: Rales present. No wheezing.   Chest:      Chest wall: No tenderness.   Abdominal:      General: Bowel sounds are normal. There is no distension.      Palpations: Abdomen is soft.      Tenderness: There is no abdominal tenderness.   Musculoskeletal:         General: No swelling. Normal range of motion.      Right lower leg: No edema.      Left lower leg: No edema.   Skin:     General: Skin is warm and dry.      Coloration: Skin is not jaundiced.      Findings: No bruising or erythema.   Neurological:      Mental Status: He is confused.      Cranial Nerves: Cranial nerves 2-12 are intact. No dysarthria or facial asymmetry.      Sensory: Sensation is intact.      Motor: Weakness present.      Comments: ANOx2 (Person and place, not time)  strength L>R, and LLE> RLE against resistance, both are new for him.          LABS:    Lab Results   Component Value Date    WBC 7.1 04/14/2025    HGB 11.9 (L) 04/14/2025    " HCT 36.2 (L) 04/14/2025    MCV 85 04/14/2025     (L) 04/14/2025      Hemoglobin   Date Value Ref Range Status   04/14/2025 11.9 (L) 13.5 - 17.5 g/dL Final   03/26/2025 9.6 (L) 13.5 - 17.5 g/dL Final   03/25/2025 9.7 (L) 13.5 - 17.5 g/dL Final   03/24/2025 9.5 (L) 13.5 - 17.5 g/dL Final   03/23/2025 10.0 (L) 13.5 - 17.5 g/dL Final        Lab Results   Component Value Date    CREATININE 10.72 (H) 04/14/2025    BUN 39 (H) 04/14/2025     (L) 04/14/2025    K 5.1 04/14/2025    CL 98 04/14/2025    CO2 26 04/14/2025      Glucose   Date Value Ref Range Status   04/14/2025 101 (H) 74 - 99 mg/dL Final      Creatinine   Date Value Ref Range Status   04/14/2025 10.72 (H) 0.50 - 1.30 mg/dL Final   03/23/2025 7.46 (H) 0.50 - 1.30 mg/dL Final   03/22/2025 11.62 (H) 0.50 - 1.30 mg/dL Final   03/21/2025 8.57 (H) 0.50 - 1.30 mg/dL Final   03/20/2025 6.69 (H) 0.50 - 1.30 mg/dL Final      Lab Results   Component Value Date    CALCIUM 10.0 04/14/2025    PHOS 3.9 04/14/2025      Magnesium   Date Value Ref Range Status   04/14/2025 1.98 1.60 - 2.40 mg/dL Final      Lab Results   Component Value Date    ALT 26 04/14/2025    AST 30 04/14/2025    ALKPHOS 109 04/14/2025    BILITOT 0.7 04/14/2025      Lab Results   Component Value Date    INR 1.1 04/14/2025    INR 1.3 (H) 09/11/2024    INR 1.0 01/05/2024    PROTIME 12.5 (H) 04/14/2025    PROTIME 15.0 (H) 09/11/2024    PROTIME 10.8 01/05/2024        IMAGING:  === 09/10/24 ===    US THORACENTESIS    - Impression -  Uneventful thoracentesis, as detailed above. Right Pleural space, 750  mL    I personally performed and/or directly supervised this study and was  present for the entire procedure.    Performed and dictated at [facility].    Signed by: Aamir Thacker 9/12/2024 4:23 PM  Dictation workstation:   [id number]  === 04/14/25 ===    CT ABDOMEN PELVIS WO IV CONTRAST    - Impression -  1. Mild wall thickening involving a segment of small  bowel in the  left mid abdomen is nonspecific but may be seen with enteritis. No  evidence of bowel obstruction.  2. Interval enlargement of bilateral pleural effusions, with mild  bibasilar atelectasis.  3. Limited evaluation of the left iliac fossa transplanted kidney  given the lack of IV contrast.  4. Mild ascites.      I personally reviewed the images/study and I agree with the findings  as stated. This study was interpreted at [facility], [place], [place].    MACRO:  None.    Signed by: Fatimah Hogan 4/14/2025 9:39 PM  Dictation workstation:   [id number]    MICRO:  No results found for the last 90 days.            Assessment/Plan   69 y.o. male  w/ PMHx of ESRD s/p DDKT x2, non-functioning, 1992  then c/b allograft failure 2006, 2nd Transplant 2013, c/b impaired allograft function, on tacrolimus and prednisone ), iHD (since May 2024, TTS), MGUS, Type II DM (A1c 9.1% 12/16/24), HTN, Prostate CA s/p radical prostatectomy, HCV S/p SVR (Tx w/ Harvoni, HCV PCR - 2019), Hx of C. difficile (Tx w/ Vanc PO, 2/6/25) presented with nausea, vomiting, and SOB, found to be acutely altered with minimal responses, profound tachypnea to the 50s and hypoxia to low 80s.  Neurology consulted, NIHSS 1 for mild dysarthria and difficulty answering LOC questions thus leading to BAT activation.  Altered mental status workup largely unremarkable, acute change in mentation thought to be secondary to hypertensive encephalopathy with possible respiratory component.  Chest x-ray demonstrated pulmonary edema which was largely driven by hypertensive emergency, Pt treated with nifedipine ER 90 mg, nicardipine and nitroglycerin drip with subsequent improvement in respiratory status and mentation.  Patient transferred to the MICU for hypertensive emergency and need for dialysis.  Transplant nephrology consulted appreciate recs.    NEUROLOGY/PSYCH:  #Acute Encephalopathy  #Hypertensive Emergency  -BAT called  while in the ED for dysarthria and NIHSS 1, difficulty answering LOC questions however eventually was able to answer appropriately.  -CTH negative for acute intracranial process, no seizure activity noted and presentation thought to be secondary to hypertensive encephalopathy with possible respiratory component.  -Received nifedipine ER 90 mg, nicardipine drip and nitroglycerin drip  -BP on arrival to MICU floor 111/63, 247/134 on initial presentation  - strength L> R, and LLE strength> RLE.  Both are new according to patient and daughter who is at bedside.  Plan:  -Hold nitroglycerin drip for now  -Target BP goal: -200  -Significant drop in BP, concerning for low cerebral perfusion pressure.  -Neurology consulted, appreciate recs  -Recommend q2h neurochecks  -Consider repeat CTH if patient develops FND.    CARDIOVASCULAR:  #Type II MI   -Etiology: Profound hypotension coupled with anemia of chronic disease secondary to ESRD.  :: ECG on admission   :: Troponin 64 -->161  Plan:  -Maintain on continuous telemetry  -Trend troponin to peak       #HFpEF (EF 54% 4/5/2024)  #CAD  #DLD  #HTN   Plan:  -Restart home Coreg 37.5 mg BID, nifedipine 90, and Imdur 60 Mg after HD  -Continue pravastatin 10 mg daily    PULMONARY:  #Acute on chronic hypoxic respiratory failure 2/2 CAP  #Pulmonary edema  -Patient reports cough, and SOB.  Found to be tachypneic to the 50s and hypoxic to low 80s.  Exam with diffuse rhonchi.  On 6 L via nasal cannula.  -CXR:Right basilar airspace opacity may be a combination of atelectasis and edema.  Cannot exclude PNA.  -Etiology: Pulmonary edema with likely superimposed PNA.  -Received ceftriaxone and azithromycin while in the ED.  -BNP 2479  Plan:  -Continue IV ceftriaxone 1 g (4/15-4/20/25)  and azithromycin (4/15-*)  -Maintain on continuous pulse ox  -Fluid removal per HD.  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 Bridges, DW 69kg   -Fistulogram  scheduled as outpt, however never completed.  Plan:  -Transplant nephrology consulted, appreciate recs  -N.p.o. for possible procedure  -Daily AM tacro levels     -C/w Pred and tacro for now pending transplant nephrology approval    GASTROENTEROLOGY:  #Gastroparesis   #Nausea  #Vomiting  -ECG on admission NSR, right axis deviation otherwise no acute ischemic changes.  QTc 463  Plan:  -Continue home metoclopramide 5 mg TID    -Zofran 4 mg as needed Q8h     ENDOCRINOLOGY:  #Type II DM  :: A21C 9.1 (12/2024)  :: Home reg- Lantus 6 units qam, SSI   Plan:  - Lantus 3 units every morning, mild SSI   - POC Glucose AC at bedtime   - Hypoglycemia protocol     HEMATOLOGY:  #Anemia of chronic disease (baseline Hgb 9-10)  -See renal section for details      MUSCULOSKELETAL/ SKIN:  #BLE Hypopigmentation      INFECTIOUS DISEASE:  #Hx of C. difficile  #C/f CAP   :: Cdif PCR positive, C dif EIA negative, stool pathogen panel negative, stool O/P negative (01/2025)  :: s/p 10 days PO Vanc (01-02/2025)  :: RVP negative in ED  :: CT A/P without evidence of acute colonic pathology   :: Histoplasma antigen negative, BD-glucan negative, aspergillus negative, blastomyces abx negative, T spot negative (02-03/2025)  :: C. Diff is negative,   :: Rotavirus positive on recent admission  Plan:  -See pulmonary section    F: PRN  E: K>4 Phos>3 Mag>2  N: NPO  A: pIV, double lumen R. Tunneled catheter, LUE AVF  O2: 6L NC  Drips: nicardipine and NTG gtt  Abx: CTX, Azithro  DVT PPx: SQH  GI PPx: PPI    CODE STATUS: FULL (confirmed with on admission)  SURROGATE DECISION MAKER:     Marvin PURVIS  MD Patrick MPH  Resident, PGY-2

## 2025-04-15 NOTE — ED PROVIDER NOTES
69-year-old male with a history of ESRD last dialyzed this morning who is presenting with nausea, vomiting, shortness of breath.  On initial arrival to the emergency department, is diaphoretic, hypertensive to 240, tachypneic to the 50s, hypoxic to 80s.  Diffuse abdominal tenderness, lung exam with diffuse rhonchi versus rales, challenging to tell with tachypnea.  No edema.    Chest x-ray shows pulmonary edema, he was initiated on nitroglycerin which improved his respiratory rate significantly, blood pressure remains elevated.    Shortly after arrival, he did have an episode where he became less responsive where he would not open his mouth or say words, and was confused.  At this point he was with several attempts at redirection able to follow my commands and lift his arms and his legs, was disoriented.  A bat was called, he had a CT of the head that was negative, neuro evaluated him and thinks it is likely related to hypertension or toxic metabolic encephalopathy, they canceled the bat.

## 2025-04-15 NOTE — PROGRESS NOTES
Physical Therapy    Physical Therapy Evaluation    Patient Name: Manolo Bashir  MRN: [id number]  Room: 02/02  Today's Date: 4/15/2025   Time Calculation  Start Time: 1010  Stop Time: 1030  Time Calculation (min): 20 min    Assessment/Plan   PT Assessment  Barriers to Discharge Home: No anticipated barriers  End of Session Communication: Bedside nurse  End of Session Patient Position: Up in chair, Alarm off, caregiver present  IP OR SWING BED PT PLAN  Inpatient or Swing Bed: Inpatient  PT Plan: PT Eval only  PT Eval Only Reason: No acute PT needs identified  PT Frequency: PT eval only  PT Discharge Recommendations: No PT needed after discharge  PT Recommended Transfer Status: Stand by assist    Subjective      General Visit Information:  Subjective: Patient is alert, agreeable to PT.  Minimal conversive throughout session.    Reason for Referral: AMS, HTN emergency  Past Medical History Relevant to Rehab: ESRD s/p renal trasnplant on dialysis, DM, HTN, prostate CA.  Prior to Session Communication: Bedside nurse  Patient Position Received: Bed, 3 rail up, Alarm off, not on at start of session  Family/Caregiver Present: Yes  Caregiver Feedback: significant other   Home Living:  Home Living  Type of Home: House  Lives With: Significant other  Home Adaptive Equipment: Walker rolling or standard, Cane  Home Layout: Stairs to alternate level with rails, Bed/bath upstairs  Alternate Level Stairs-Rails: Right  Home Access: Stairs to enter with rails  Entrance Stairs-Rails: Both (far apart)  Bathroom Shower/Tub: Tub/shower unit  Bathroom Toilet: Handicapped height  Bathroom Equipment: Raised toilet seat without rails  Bathroom Accessibility: up 6+6 with 1 rail  Prior Level of Function:  Prior Function Per Pt/Caregiver Report  Level of [place]:  (independent in/outdoor ambulation no device, independent stairclimbing, no falls)  Receives Help From:  (dtr as needed)  ADL Assistance: Independent  Homemaking Assistance:  "Independent (shared laundry/cook/clean)  Ambulatory Assistance:  (reports (I) without AD)  Vocational: On disability (was a )  Leisure: \"staying active\"  Hand Dominance: Right  Prior Function Comments: s/o drives to and from HD, no falls  Precautions:  Precautions  Medical Precautions: Fall precautions  Precautions Comment: No BP LUE  Vital Signs:  Pre Vitals  Vital Signs  Heart Rate: 65  SpO2: 92 %  BP: 108/58   Post Vitals  Vital Signs  Heart Rate: 75  SpO2: 92 %  BP: 101/65   Lines/Tubes/Drains:  Hemodialysis Cath 03/25/25 Double lumen Right Tunneled catheter Jugular (Active)   Number of days: 21     Medical Gas Therapy: Supplemental oxygen  Medical Gas Delivery Method: Nasal cannula (2L)  Continuous Medications/Drips:  niCARdipine, 2.5-15 mg/hr, Last Rate: Stopped (04/15/25 0340)  nitroglycerin, 5-400 mcg/min, Last Rate: Stopped (04/15/25 0505)        Objective   Pain:  Pain Assessment  0-10 (Numeric) Pain Score:  (unrated headache, unchanged with activity)    Cognition:  Cognition  Overall Cognitive Status: Within Functional Limits  Orientation Level: Oriented X4  Following Commands: Follows all commands and directions without difficulty    General Assessments:  Extremity/Trunk Assessments:  Tone: No abnormalities noted  Sensation  Light Touch: No apparent deficits  Coordination  Movements are Fluid and Coordinated: Yes  Upper Extremity  ROM: WNL  Strength:WFL  Lower Extremity  ROM: WFL  Strength: WFL   Sitting Static Balance Normal  Sitting Dynamic Balance Normal  Standing Static Balance Normal  Standing Dynamic Balance Normal    Functional Assessments:  Bed Mobility 1  Bed Mobility 1: Supine to sitting  Level of Assistance 1: Independent    Transfers  Transfer: Yes  Transfer 1  Transfer From 1: Sit to  Transfer to 1: Stand  Transfer Device 1:  (no device)  Transfer Level of Assistance 1: Distant supervision  Transfers 2  Transfer From 2: Stand to  Transfer to 2: Sit  Transfer Device 2:  (no " device)  Transfer Level of Assistance 2: Distant supervision  Transfers 3  Transfer From 3: Bed to  Transfer to 3: Chair with arms  Transfer Device 3:  (no device)  Transfer Level of Assistance 3: Close supervision    Ambulation/Gait Training 1  Surface 1: Level tile  Device 1: No device  Assistance 1: Close supervision  Quality of Gait 1:  (mild alternating step length)  Comments/Distance (ft) 1: 40'              Outcome Measures:  [facility] Basic Mobility  Turning from your back to your side while in a flat bed without using bedrails: A little  Moving from lying on your back to sitting on the side of a flat bed without using bedrails: A little  Moving to and from bed to chair (including a wheelchair): A little  Standing up from a chair using your arms (e.g. wheelchair or bedside chair): A little  To walk in hospital room: A little  Climbing 3-5 steps with railing: A little  Basic Mobility - Total Score: 18                 [place]  Sitting Balance: Steady, safe  Arises: Able, uses arms to help  Attempts to Arise: Able to arise, one attempt  Immediate Standing Balance (First 5 Seconds): Steady without walker or other support  Standing Balance: Narrow stance without support  Nudged: Steady without walker or other support  Eyes Closed: Steady  Turned 360 Degrees: Steadiness: Steady  Turned 360 Degrees: Continuity of Steps: Continuous  Sitting Down: Uses arms or not a smooth motion  Balance Score: 14  Initiation of Gait: No hesitancy  Step Height: R Swing Foot: Right foot complete clears floor  Step Length: R Swing Foot: Passes left stance foot  Step Height: L Swing Foot: Left foot complete clears floor  Step Length: L Swing Foot: Passes right stance foot  Step Symmetry: Right and left step length not equal (Estimate)  Step Continuity: Steps appear continuous  Path: Mild/moderate deviation or uses walking aid  Trunk: No sway, no flexion, no use of arms, no walking aid  Walking Time: Heels almost touching while  walking  Gait Score: 10  Total Score: 24          Encounter Problems       Encounter Problems (Active)       Pain - Adult            Assessment: Patient presents 2/2 HTN emergency.  Currently supervision for mobility 2/2 medical acuity.  At preadmission functional baseline, No further acute PT warranted at this time.  Please continue mobility during acute stay with nursing staff.  PT to sign off with no further needs.      Education Documentation  Precautions, taught by Leobardo Wallace PT at 4/15/2025 10:48 AM.  Learner: Patient  Readiness: Acceptance  Method: Explanation  Response: Verbalizes Understanding, Demonstrated Understanding  Comment: No needs    Home Exercise Program, taught by Leobardo Wallace PT at 4/15/2025 10:48 AM.  Learner: Patient  Readiness: Acceptance  Method: Explanation  Response: Verbalizes Understanding, Demonstrated Understanding  Comment: No needs    Mobility Training, taught by Leobardo Wallace PT at 4/15/2025 10:48 AM.  Learner: Patient  Readiness: Acceptance  Method: Explanation  Response: Verbalizes Understanding, Demonstrated Understanding  Comment: No needs    Education Comments  No comments found.          04/15/25 at 10:51 AM   Leobardo Wallace PT   Rehab Office: [phone]

## 2025-04-15 NOTE — HOSPITAL COURSE
Manolo Bashir is a 69 y.o. male w/PMHx of ESRD s/p Renal transplants x2, non-functioning: initially in 1992 c/b allograft failure 2006, 2nd transplant 2013, c/b impaired allograft function, currently on iHD since May 2024(TTS); on tacrolimus and prednisone 5mg, MGUS, DMII (A1c 9.1% 12/16/24), HTN, prostate cancer s/p radical prostatectomy, HCV S/p SVR (treated w/ Harvoni, HCV PCR negative 2019), Recent history of C. diff (treated w/ vancomycin PO 10 day course EOT 2/6/25) presented to  ED with chief complaint of nausea, vomiting, chest pain and headache.     In the ED, vitals as follows: 247/134, 109, 40, 36.9 °C.  Initial labs as follows: Hgb 11.9 (baseline Hgb 9-10), platelets 122, , BUN 39, creatinine 10.72, BNP 2479, INR 1.1, troponin 64 and lipase 27.  CT abdomen pelvis without IV con demonstrated mild wall thickening involving small bowel in the left midabdomen concerning for enteritis, no evidence of bowel obstruction, interval enlargement of bilateral pleural effusions with mild bibasilar atelectasis, and mild ascites.  CT brain attack head without IV con without acute intracranial abnormality.  CXR demonstrated right basilar airspace opacity (combination of opacity and edema) concerning for possible pneumonia.     While in the ED a BAT was called at 1938 for altered mental status.  Prior to BAT, triage RN reports patient being oriented.  Neurology evaluated patient, NIHSS was 1 for mild dysarthria and had difficulty answering LOC question.  No seizure-like activity noted, CT head done and without any acute intracranial abnormality.  Presentation was not suggestive of neurovascular insult and that was canceled.  Altered mental status was attributed to hypertensive encephalopathy along with possible respiratory failure.     Patient was recently admitted to [facility] from 3/19-3/26/2025 after he was found to be , hypotensive , then transferred to MICU for urgent dialysis due to stenosed fistula and  hyperkalemia (7.1 ). Additionally he complained of CP, and ECG demonstrated ST elevations in V2/V3. Patient with multiple admissions this year With similar presentations on 1/26/2025, 2/5/2025, 3/6/2025.     Upon arrival to the floor, vital signs as follows: 111/63, 72, 20, 36.3 °C.  ANO x 2, oriented to person and place but not time.  Daughter at bedside during initial evaluation.  Patient demonstrated difficulty answering questions requiring multiple questioning, and difficulty with recollection of the events.  He reports headaches, lightheadedness, nausea , vomiting with acute onset this morning, and weakness.  Chest pain, cough, SOB, and abdominal pain.  He denies any diplopia, blurry vision, fever, chills, changes in diet, and pruritus.  Patient denies missing any HD sessions, last reported dialysis was this past Saturday.  Reports adherence to medications and confirmed with daughter at bedside.  SBP at home at 170 and normal DBP.  Reports taking isosorbide and nifedipine as directed however only takes carvedilol if SBP is greater than 140.    4/15 AM, he was sleeping comfortably in bed with O2 via NC at 2L saturating at 98%. Transplant nephrology were consulted for HD needs and his BP meds held I/s/o soft Bps. Trops were elevated but peaked at 199 and repeat EKGs were without ST changes. He was continued on his home Augmentin(stop date 4/16) and home Cresemba(stop date 6/5). He was doing fine and was ready for floor transfer.    4/16 Patient desatting intermittently when laying flat while sleeping which is concerning for sleep apnea.  When patient awoke he was weaned to room air.  He did have soft BPs so Imdur dose was decreased from 60 -> 30 mg daily until PCP follow-up.  He completed his course of Augmentin.  Physical therapy evaluated patient and he did not have any needs.

## 2025-04-15 NOTE — CONSULTS
"Nutrition Initial Assessment:   Nutrition Assessment    Reason for Assessment: Admission nursing screening - MST 2    Patient is a 69 y.o. male who presented with nausea, vomiting, and SOB, found to be acutely altered with minimal responses, profound tachypnea to the 50s and hypoxia to low 80s. Neurology consulted, NIHSS 1 for mild dysarthria and difficulty answering LOC questions thus leading to BAT activation. Per team, \"altered mental status workup largely unremarkable, acute change in mentation thought to be secondary to hypertensive encephalopathy with possible respiratory component. Chest x-ray demonstrated pulmonary edema which was largely driven by hypertensive emergency.\" Pt was admitted to MICU for hypertensive emergency on 4/14 and is now stable to transfer to [facility].     PMHx of ESRD s/p DDKT x2, non-functioning, 1992 then c/b allograft failure 2006, 2nd Transplant 2013, c/b impaired allograft function, on tacrolimus and prednisone ), iHD (since May 2024, TTS), MGUS, Type II DM (A1c 9.1% 12/16/24), HTN, Prostate CA s/p radical prostatectomy, HCV S/p SVR (Tx w/ Harvoni, HCV PCR - 2019), Hx of C. difficile (Tx w/ Vanc PO, 2/6/25)     Nutrition History:  Food and Nutrient History: Pt reports his appetite has been normal with no changes. Pt states his baseline is 100% of 3-4 meals/day. Pt reports usually eating hamburgers, steaks, vegetables, fruits, breads/pastas. Pt seen by IP RDN on 3/20/25, reported ongoing poor PO. Pt amenable to try Nadanu (noted Boost/Ensure causes diarrhea for him).  Vitamin/Herbal Supplement Use: None per pt  Food Allergy:  (None per pt)       Anthropometrics:  Height: 172.7 cm (5' 7.99\")   Weight: 68.9 kg (151 lb 14.4 oz)   BMI (Calculated): 23.1  IBW/kg (Dietitian Calculated): 70 kg  Percent of IBW: 98 %         Weight History:   Wt Readings from Last 30 Encounters:   04/14/25 68.9 kg (151 lb 14.4 oz)   03/31/25 68.9 kg (152 lb)   03/25/25 73.2 kg (161 lb 6 oz)   02/27/25 68 kg " Patient calls and leaves voicemail regarding scheduling screening. Writer returns patient's call and leaves voicemail for patient to return our call.     Patient scheduling paperwork filed with pending procedure patients for Evelyn Alaniz MD. (150 lb)   02/24/25 70.1 kg (154 lb 9.6 oz)   02/24/25 70.1 kg (154 lb 9.6 oz)   02/04/25 73.3 kg (161 lb 9.6 oz)   01/05/25 71.7 kg (158 lb)   12/16/24 72.8 kg (160 lb 8 oz)   12/11/24 72.6 kg (160 lb)   12/04/24 73.9 kg (163 lb)   10/02/24 70.8 kg (156 lb)   09/25/24 70.3 kg (155 lb)   09/10/24 72.6 kg (160 lb)   09/04/24 70 kg (154 lb 5.2 oz)   08/24/24 72.6 kg (160 lb)   08/09/24 71.4 kg (157 lb 6.4 oz)   07/10/24 71.7 kg (158 lb)   07/03/24 70.4 kg (155 lb 3.2 oz)   06/26/24 71.7 kg (158 lb)   06/26/24 71.9 kg (158 lb 8 oz)   06/03/24 72.6 kg (160 lb)   05/15/24 75.7 kg (166 lb 12.8 oz)   04/30/24 76 kg (167 lb 8.8 oz)   04/22/24 77.6 kg (171 lb)   04/18/24 75.3 kg (166 lb)   04/16/24 76.4 kg (168 lb 6.4 oz)   04/03/24 77.1 kg (170 lb)   03/21/24 75.3 kg (166 lb 0.1 oz)   03/20/24 77.6 kg (171 lb)       Weight Change %:  Weight History / % Weight Change: Pt reports previous UBW of 170 lb and last weighed that prior to starting HD in 05/2024 (11.2%). Per chart, pt with a 6% weight loss x 2 months, a 5.1% weight loss x 4 months and a 9.8% weight loss x 1 year.  Significant Weight Loss: No    Nutrition Focused Physical Exam Findings:    Subcutaneous Fat Loss:   Orbital Fat Pads: Mild-Moderate (slight dark circles and slight hollowing)  Buccal Fat Pads: Mild-Moderate (flat cheeks, minimal bounce)  Triceps: Mild-Moderate (less than ample fat tissue)  Muscle Wasting:  Temporalis: Mild-Moderate (slight depression)  Pectoralis (Clavicular Region): Mild-Moderate (some protrusion of clavicle)  Deltoid/Trapezius: Mild-Moderate (slight protrusion of acromion process)  Interosseous: Well nourished (muscle bulges)  Quadriceps: Mild-Moderate (mild depression on inner and outer thigh)  Gastrocnemius: Mild-Moderate (not well developed muscle)  Edema:  Edema: +1 trace  Edema Location: +1 sacral, nonpitting generalized  Physical Findings:  Skin: Negative  Digestive System Findings: Nausea, Vomiting (pt reports N/V started  yesterday and has relieved with zofran)  Mouth Findings:  (None per pt)    Nutrition Significant Labs:  CBC Trend:   Results from last 7 days   Lab Units 04/15/25  0630 04/14/25  1906   WBC AUTO x10*3/uL 7.5 7.1   RBC AUTO x10*6/uL 3.50* 4.24*   HEMOGLOBIN g/dL 9.8* 11.9*   HEMATOCRIT % 29.1* 36.2*   MCV fL 83 85   PLATELETS AUTO x10*3/uL 125* 122*    , BMP Trend:   Results from last 7 days   Lab Units 04/15/25  0630 04/14/25  1906   GLUCOSE mg/dL 43* 101*   CALCIUM mg/dL 9.3 10.0   SODIUM mmol/L 134* 134*   POTASSIUM mmol/L 5.2 5.1   CO2 mmol/L 25 26   CHLORIDE mmol/L 98 98   BUN mg/dL 44* 39*   CREATININE mg/dL 10.73* 10.72*    , A1C:  Lab Results   Component Value Date    HGBA1C 9.1 (H) 12/16/2024   , BG POCT trend:   Results from last 7 days   Lab Units 04/15/25  1112 04/15/25  0943 04/15/25  0912 04/14/25  2010   POCT GLUCOSE mg/dL 139* 139* 49* 78    , Liver Function Trend:   Results from last 7 days   Lab Units 04/14/25  1906   ALK PHOS U/L 109   AST U/L 30   ALT U/L 26   BILIRUBIN TOTAL mg/dL 0.7    , Renal Lab Trend:   Results from last 7 days   Lab Units 04/15/25  0630 04/14/25  1906   POTASSIUM mmol/L 5.2 5.1   PHOSPHORUS mg/dL 3.9 3.9   SODIUM mmol/L 134* 134*   MAGNESIUM mg/dL 1.78 1.98   EGFR mL/min/1.73m*2 5* 5*   BUN mg/dL 44* 39*   CREATININE mg/dL 10.73* 10.72*    , Vit D:   Lab Results   Component Value Date    VITD25 27 (L) 12/16/2024    , Iron Panel:   Lab Results   Component Value Date    IRON 13 (L) 08/24/2024    TIBC 143 (L) 08/24/2024    FERRITIN 1,564 (H) 02/28/2025        Nutrition Specific Medications:  Scheduled medications  amoxicillin-pot clavulanate, 1 tablet, oral, q24h ZOË  carvedilol, 37.5 mg, oral, BID  cinacalcet, 30 mg, oral, Daily  clotrimazole, , Topical, BID  epoetin aida, 10,000 Units, subcutaneous, q7 days  heparin (porcine), 5,000 Units, subcutaneous, q8h  isavuconazonium sulfate, 372 mg, oral, Daily  isosorbide mononitrate ER, 60 mg, oral, Daily  ketorolac, 1 drop, Left  Eye, TID  metoclopramide, 5 mg, oral, TID AC  neomycin-polymyxin-dexAMETHasone, 2 drop, Left Eye, q AM  NIFEdipine ER, 90 mg, oral, BID  pravastatin, 10 mg, oral, Nightly  sevelamer carbonate, 800 mg, oral, TID AC  tacrolimus, 0.5 mg, oral, BID  [Held by provider] tacrolimus, , Topical, BID  [START ON 4/16/2025] vitamin B complex-vitamin C-folic acid, 1 capsule, oral, Daily      Continuous medications     PRN medications  PRN medications: acetaminophen, acetaminophen, benzonatate, dextrose, dextrose, glucagon, glucagon, sodium chloride      I/O:    ; Stool Appearance: Soft (04/15/25 1100)    Dietary Orders (From admission, onward)       Start     Ordered    04/15/25 1331  Adult diet Renal; Potassium Restricted 2 gm (50mEq); 2 - 3 grams Sodium  Diet effective now        Question Answer Comment   Diet type Renal    Potassium restriction: Potassium Restricted 2 gm (50mEq)    Sodium restriction: 2 - 3 grams Sodium        04/15/25 1330    04/15/25 0517  May Participate in Room Service  ( ROOM SERVICE MAY PARTICIPATE)  Once        Question:  .  Answer:  Yes    04/15/25 0516                     Estimated Needs:   Total Energy Estimated Needs in 24 hours (kCal): 1900 kCal  Method for Estimating Needs: MSJ 1438 x 1.3  Total Protein Estimated Needs in 24 Hours (g):  ( g)  Method for Estimating 24 Hour Protein Needs: 1.2-1.5 g/kg CBW  Total Fluid Estimated Needs in 24 Hours (mL):  (1 ml/kcal or per med team)           Nutrition Diagnosis   Malnutrition Diagnosis  Patient has Malnutrition Diagnosis: Yes  Diagnosis Status: New  Malnutrition Diagnosis: Moderate malnutrition related to chronic disease or condition  As Evidenced by: mild-modertae fat loss and muscle wasting  Additional Assessment Information: Despite pt reports of good intake, pt may not be meeting increased needs    Nutrition Diagnosis  Patient has Nutrition Diagnosis: Yes  Diagnosis Status (1): New  Nutrition Diagnosis 1: Increased nutrient needs  Related  to (1): Increased metabolic demand  As Evidenced by (1): ESRD on HD       Nutrition Interventions/Recommendations        Nutrition Recommendations:    Liberalize diet to regular to promote intake (potassium and phos WNL).   Obtain daily weights.   Re-check vit D levels to assess supplementation needs    Nutrition Interventions/Goals:   Ordered [facility] EDI standard 1.0 (325 kcal, 16 g pro) BID        Nutrition Monitoring and Evaluation   Food/Nutrient Related History Monitoring  Monitoring and Evaluation Plan: Estimated Energy Intake  Estimated Energy Intake: Energy intake greater or equal to 75% of estimated energy needs    Anthropometric Measurements  Monitoring and Evaluation Plan: Body weight  Body Weight: Body weight - Maintain stable weight    Biochemical Data, Medical Tests and Procedures  Monitoring and Evaluation Plan: Electrolyte/renal panel, Glucose/endocrine profile, Vitamin profile  Criteria: WNL         Goal Status: New goal(s) identified    Time Spent (min): 60 minutes

## 2025-04-15 NOTE — NURSING NOTE
Admission Note:     Patient transferred to [place] [address] from MICU d/t SOB. Patient is alert and oriented times 4. VSS. Patient belongings include shirt, pants, and home medication that has being put up until discharge. Patient was oriented to room and call light system. Discharge disposition unknown at this time.

## 2025-04-15 NOTE — CARE PLAN
The patient's goals for the shift include      The clinical goals for the shift include Pt will remain safe and stable during shift      Problem: Skin  Goal: Decreased wound size/increased tissue granulation at next dressing change  Outcome: Progressing  Goal: Participates in plan/prevention/treatment measures  Outcome: Progressing  Goal: Prevent/manage excess moisture  Outcome: Progressing  Goal: Prevent/minimize sheer/friction injuries  Outcome: Progressing  Goal: Promote/optimize nutrition  Outcome: Progressing  Goal: Promote skin healing  Outcome: Progressing     Problem: Pain - Adult  Goal: Verbalizes/displays adequate comfort level or baseline comfort level  Outcome: Progressing     Problem: Safety - Adult  Goal: Free from fall injury  Outcome: Progressing     Problem: Discharge Planning  Goal: Discharge to home or other facility with appropriate resources  Outcome: Progressing     Problem: Chronic Conditions and Co-morbidities  Goal: Patient's chronic conditions and co-morbidity symptoms are monitored and maintained or improved  Outcome: Progressing     Problem: Nutrition  Goal: Nutrient intake appropriate for maintaining nutritional needs  Outcome: Progressing     Problem: Fall/Injury  Goal: Not fall by end of shift  Outcome: Progressing  Goal: Be free from injury by end of the shift  Outcome: Progressing  Goal: Verbalize understanding of personal risk factors for fall in the hospital  Outcome: Progressing  Goal: Verbalize understanding of risk factor reduction measures to prevent injury from fall in the home  Outcome: Progressing  Goal: Use assistive devices by end of the shift  Outcome: Progressing  Goal: Pace activities to prevent fatigue by end of the shift  Outcome: Progressing     Problem: Pain  Goal: Takes deep breaths with improved pain control throughout the shift  Outcome: Progressing  Goal: Turns in bed with improved pain control throughout the shift  Outcome: Progressing  Goal: Walks with  improved pain control throughout the shift  Outcome: Progressing  Goal: Performs ADL's with improved pain control throughout shift  Outcome: Progressing  Goal: Participates in PT with improved pain control throughout the shift  Outcome: Progressing  Goal: Free from opioid side effects throughout the shift  Outcome: Progressing  Goal: Free from acute confusion related to pain meds throughout the shift  Outcome: Progressing     Problem: Diabetes  Goal: Achieve decreasing blood glucose levels by end of shift  Outcome: Progressing  Goal: Increase stability of blood glucose readings by end of shift  Outcome: Progressing  Goal: Decrease in ketones present in urine by end of shift  Outcome: Progressing  Goal: Maintain electrolyte levels within acceptable range throughout shift  Outcome: Progressing  Goal: Maintain glucose levels >70mg/dl to <250mg/dl throughout shift  Outcome: Progressing  Goal: No changes in neurological exam by end of shift  Outcome: Progressing  Goal: Learn about and adhere to nutrition recommendations by end of shift  Outcome: Progressing  Goal: Vital signs within normal range for age by end of shift  Outcome: Progressing  Goal: Increase self care and/or family involovement by end of shift  Outcome: Progressing  Goal: Receive DSME education by end of shift  Outcome: Progressing

## 2025-04-16 ENCOUNTER — PHARMACY VISIT (OUTPATIENT)
Dept: PHARMACY | Facility: CLINIC | Age: 70
End: 2025-04-16
Payer: COMMERCIAL

## 2025-04-16 VITALS
BODY MASS INDEX: 23.02 KG/M2 | TEMPERATURE: 98.2 F | WEIGHT: 151.9 LBS | HEART RATE: 74 BPM | DIASTOLIC BLOOD PRESSURE: 53 MMHG | RESPIRATION RATE: 16 BRPM | OXYGEN SATURATION: 96 % | HEIGHT: 68 IN | SYSTOLIC BLOOD PRESSURE: 108 MMHG

## 2025-04-16 PROBLEM — R06.02 SHORTNESS OF BREATH: Status: RESOLVED | Noted: 2024-05-11 | Resolved: 2025-04-16

## 2025-04-16 LAB
ALBUMIN SERPL BCP-MCNC: 3.1 G/DL (ref 3.4–5)
ALP SERPL-CCNC: 71 U/L (ref 33–136)
ALT SERPL W P-5'-P-CCNC: 18 U/L (ref 10–52)
ANION GAP BLDV CALCULATED.4IONS-SCNC: 6 MMOL/L (ref 10–25)
ANION GAP SERPL CALC-SCNC: 14 MMOL/L (ref 10–20)
AST SERPL W P-5'-P-CCNC: 16 U/L (ref 9–39)
BASE EXCESS BLDV CALC-SCNC: 4.7 MMOL/L (ref -2–3)
BASOPHILS # BLD AUTO: 0.02 X10*3/UL (ref 0–0.1)
BASOPHILS NFR BLD AUTO: 0.6 %
BILIRUB DIRECT SERPL-MCNC: 0.1 MG/DL (ref 0–0.3)
BILIRUB SERPL-MCNC: 0.4 MG/DL (ref 0–1.2)
BODY TEMPERATURE: 37 DEGREES CELSIUS
BUN SERPL-MCNC: 27 MG/DL (ref 6–23)
CA-I BLDV-SCNC: 1.13 MMOL/L (ref 1.1–1.33)
CALCIUM SERPL-MCNC: 8.7 MG/DL (ref 8.6–10.6)
CHLORIDE BLDV-SCNC: 102 MMOL/L (ref 98–107)
CHLORIDE SERPL-SCNC: 100 MMOL/L (ref 98–107)
CO2 SERPL-SCNC: 29 MMOL/L (ref 21–32)
CREAT SERPL-MCNC: 7.65 MG/DL (ref 0.5–1.3)
EGFRCR SERPLBLD CKD-EPI 2021: 7 ML/MIN/1.73M*2
EOSINOPHIL # BLD AUTO: 0.13 X10*3/UL (ref 0–0.7)
EOSINOPHIL NFR BLD AUTO: 4 %
ERYTHROCYTE [DISTWIDTH] IN BLOOD BY AUTOMATED COUNT: 17 % (ref 11.5–14.5)
GLUCOSE BLD MANUAL STRIP-MCNC: 168 MG/DL (ref 74–99)
GLUCOSE BLD MANUAL STRIP-MCNC: 309 MG/DL (ref 74–99)
GLUCOSE BLDV-MCNC: 178 MG/DL (ref 74–99)
GLUCOSE SERPL-MCNC: 172 MG/DL (ref 74–99)
HCO3 BLDV-SCNC: 29.8 MMOL/L (ref 22–26)
HCT VFR BLD AUTO: 29.4 % (ref 41–52)
HCT VFR BLD EST: 30 % (ref 41–52)
HGB BLD-MCNC: 9.1 G/DL (ref 13.5–17.5)
HGB BLDV-MCNC: 10.1 G/DL (ref 13.5–17.5)
IMM GRANULOCYTES # BLD AUTO: 0.01 X10*3/UL (ref 0–0.7)
IMM GRANULOCYTES NFR BLD AUTO: 0.3 % (ref 0–0.9)
INHALED O2 CONCENTRATION: 32 %
LACTATE BLDV-SCNC: 0.9 MMOL/L (ref 0.4–2)
LYMPHOCYTES # BLD AUTO: 0.54 X10*3/UL (ref 1.2–4.8)
LYMPHOCYTES NFR BLD AUTO: 16.8 %
MAGNESIUM SERPL-MCNC: 1.98 MG/DL (ref 1.6–2.4)
MCH RBC QN AUTO: 27.8 PG (ref 26–34)
MCHC RBC AUTO-ENTMCNC: 31 G/DL (ref 32–36)
MCV RBC AUTO: 90 FL (ref 80–100)
MONOCYTES # BLD AUTO: 0.35 X10*3/UL (ref 0.1–1)
MONOCYTES NFR BLD AUTO: 10.9 %
NEUTROPHILS # BLD AUTO: 2.17 X10*3/UL (ref 1.2–7.7)
NEUTROPHILS NFR BLD AUTO: 67.4 %
NRBC BLD-RTO: 0 /100 WBCS (ref 0–0)
OXYHGB MFR BLDV: 95.2 % (ref 45–75)
PCO2 BLDV: 46 MM HG (ref 41–51)
PH BLDV: 7.42 PH (ref 7.33–7.43)
PHOSPHATE SERPL-MCNC: 4.4 MG/DL (ref 2.5–4.9)
PLATELET # BLD AUTO: 90 X10*3/UL (ref 150–450)
PO2 BLDV: 92 MM HG (ref 35–45)
POTASSIUM BLDV-SCNC: 5.1 MMOL/L (ref 3.5–5.3)
POTASSIUM SERPL-SCNC: 5 MMOL/L (ref 3.5–5.3)
PROT SERPL-MCNC: 6.4 G/DL (ref 6.4–8.2)
RBC # BLD AUTO: 3.27 X10*6/UL (ref 4.5–5.9)
SAO2 % BLDV: 99 % (ref 45–75)
SODIUM BLDV-SCNC: 133 MMOL/L (ref 136–145)
SODIUM SERPL-SCNC: 138 MMOL/L (ref 136–145)
WBC # BLD AUTO: 3.2 X10*3/UL (ref 4.4–11.3)

## 2025-04-16 PROCEDURE — 82947 ASSAY GLUCOSE BLOOD QUANT: CPT

## 2025-04-16 PROCEDURE — 83735 ASSAY OF MAGNESIUM: CPT

## 2025-04-16 PROCEDURE — 36415 COLL VENOUS BLD VENIPUNCTURE: CPT

## 2025-04-16 PROCEDURE — 82435 ASSAY OF BLOOD CHLORIDE: CPT

## 2025-04-16 PROCEDURE — 99239 HOSP IP/OBS DSCHRG MGMT >30: CPT

## 2025-04-16 PROCEDURE — 2500000005 HC RX 250 GENERAL PHARMACY W/O HCPCS

## 2025-04-16 PROCEDURE — 84132 ASSAY OF SERUM POTASSIUM: CPT

## 2025-04-16 PROCEDURE — 2500000002 HC RX 250 W HCPCS SELF ADMINISTERED DRUGS (ALT 637 FOR MEDICARE OP, ALT 636 FOR OP/ED)

## 2025-04-16 PROCEDURE — 2500000002 HC RX 250 W HCPCS SELF ADMINISTERED DRUGS (ALT 637 FOR MEDICARE OP, ALT 636 FOR OP/ED): Performed by: STUDENT IN AN ORGANIZED HEALTH CARE EDUCATION/TRAINING PROGRAM

## 2025-04-16 PROCEDURE — 2500000004 HC RX 250 GENERAL PHARMACY W/ HCPCS (ALT 636 FOR OP/ED)

## 2025-04-16 PROCEDURE — 99221 1ST HOSP IP/OBS SF/LOW 40: CPT | Performed by: INTERNAL MEDICINE

## 2025-04-16 PROCEDURE — 85025 COMPLETE CBC W/AUTO DIFF WBC: CPT

## 2025-04-16 PROCEDURE — 2500000001 HC RX 250 WO HCPCS SELF ADMINISTERED DRUGS (ALT 637 FOR MEDICARE OP)

## 2025-04-16 PROCEDURE — RXMED WILLOW AMBULATORY MEDICATION CHARGE

## 2025-04-16 PROCEDURE — 84100 ASSAY OF PHOSPHORUS: CPT

## 2025-04-16 PROCEDURE — 80053 COMPREHEN METABOLIC PANEL: CPT

## 2025-04-16 PROCEDURE — 82248 BILIRUBIN DIRECT: CPT

## 2025-04-16 RX ORDER — METHOCARBAMOL 500 MG/1
500 TABLET, FILM COATED ORAL EVERY 8 HOURS SCHEDULED
Qty: 30 TABLET | Refills: 0 | Status: SHIPPED | OUTPATIENT
Start: 2025-04-16 | End: 2025-05-14 | Stop reason: ALTCHOICE

## 2025-04-16 RX ORDER — LIDOCAINE 560 MG/1
1 PATCH PERCUTANEOUS; TOPICAL; TRANSDERMAL DAILY
Status: DISCONTINUED | OUTPATIENT
Start: 2025-04-16 | End: 2025-04-16 | Stop reason: HOSPADM

## 2025-04-16 RX ORDER — METHOCARBAMOL 500 MG/1
500 TABLET, FILM COATED ORAL EVERY 8 HOURS SCHEDULED
Status: DISCONTINUED | OUTPATIENT
Start: 2025-04-16 | End: 2025-04-16 | Stop reason: HOSPADM

## 2025-04-16 RX ORDER — LIDOCAINE 560 MG/1
1 PATCH PERCUTANEOUS; TOPICAL; TRANSDERMAL DAILY
Qty: 10 PATCH | Refills: 0 | Status: SHIPPED | OUTPATIENT
Start: 2025-04-17 | End: 2025-04-27

## 2025-04-16 RX ORDER — ISOSORBIDE MONONITRATE 30 MG/1
30 TABLET, EXTENDED RELEASE ORAL DAILY
Qty: 30 TABLET | Refills: 0 | Status: SHIPPED | OUTPATIENT
Start: 2025-04-17 | End: 2025-04-30 | Stop reason: SDUPTHER

## 2025-04-16 RX ORDER — ISOSORBIDE MONONITRATE 30 MG/1
30 TABLET, EXTENDED RELEASE ORAL DAILY
Status: DISCONTINUED | OUTPATIENT
Start: 2025-04-17 | End: 2025-04-16 | Stop reason: HOSPADM

## 2025-04-16 RX ORDER — AMOXICILLIN AND CLAVULANATE POTASSIUM 500; 125 MG/1; MG/1
1 TABLET, FILM COATED ORAL
Qty: 2 TABLET | Refills: 0 | Status: SHIPPED | OUTPATIENT
Start: 2025-04-16 | End: 2025-04-16 | Stop reason: HOSPADM

## 2025-04-16 RX ADMIN — METOCLOPRAMIDE 5 MG: 5 TABLET ORAL at 12:55

## 2025-04-16 RX ADMIN — METOCLOPRAMIDE 5 MG: 5 TABLET ORAL at 08:58

## 2025-04-16 RX ADMIN — METHOCARBAMOL 500 MG: 500 TABLET ORAL at 11:52

## 2025-04-16 RX ADMIN — SEVELAMER CARBONATE 800 MG: 800 TABLET, FILM COATED ORAL at 12:55

## 2025-04-16 RX ADMIN — CARVEDILOL 37.5 MG: 12.5 TABLET, FILM COATED ORAL at 08:55

## 2025-04-16 RX ADMIN — SEVELAMER CARBONATE 800 MG: 800 TABLET, FILM COATED ORAL at 08:56

## 2025-04-16 RX ADMIN — TACROLIMUS 0.5 MG: 0.5 CAPSULE ORAL at 07:10

## 2025-04-16 RX ADMIN — ISOSORBIDE MONONITRATE 60 MG: 30 TABLET, EXTENDED RELEASE ORAL at 08:56

## 2025-04-16 RX ADMIN — ASCORBIC ACID, THIAMINE MONONITRATE,RIBOFLAVIN, NIACINAMIDE, PYRIDOXINE HYDROCHLORIDE, FOLIC ACID, CYANOCOBALAMIN, BIOTIN, CALCIUM PANTOTHENATE, 1 CAPSULE: 100; 1.5; 1.7; 20; 10; 1; 6000; 150000; 5 CAPSULE, LIQUID FILLED ORAL at 08:57

## 2025-04-16 RX ADMIN — LIDOCAINE 4% 1 PATCH: 40 PATCH TOPICAL at 11:52

## 2025-04-16 RX ADMIN — ISAVUCONAZONIUM SULFATE 372 MG: 186 CAPSULE ORAL at 11:52

## 2025-04-16 RX ADMIN — AMOXICILLIN AND CLAVULANATE POTASSIUM 1 TABLET: 500; 125 TABLET, FILM COATED ORAL at 08:58

## 2025-04-16 RX ADMIN — HEPARIN SODIUM 5000 UNITS: 5000 INJECTION, SOLUTION INTRAVENOUS; SUBCUTANEOUS at 07:10

## 2025-04-16 RX ADMIN — CINACALCET HYDROCHLORIDE 30 MG: 30 TABLET, FILM COATED ORAL at 08:57

## 2025-04-16 ASSESSMENT — COGNITIVE AND FUNCTIONAL STATUS - GENERAL
HELP NEEDED FOR BATHING: A LITTLE
DAILY ACTIVITIY SCORE: 23
CLIMB 3 TO 5 STEPS WITH RAILING: A LITTLE
MOBILITY SCORE: 22
DAILY ACTIVITIY SCORE: 24
WALKING IN HOSPITAL ROOM: A LITTLE
MOBILITY SCORE: 24

## 2025-04-16 ASSESSMENT — HEART SCORE
RISK FACTORS: >2 RISK FACTORS OR HX OF ATHEROSCLEROTIC DISEASE
AGE: 65+
TROPONIN: LESS THAN OR EQUAL TO NORMAL LIMIT
HEART SCORE: 4
HISTORY: SLIGHTLY SUSPICIOUS
ECG: NORMAL

## 2025-04-16 ASSESSMENT — PAIN SCALES - GENERAL
PAINLEVEL_OUTOF10: 0 - NO PAIN
PAINLEVEL_OUTOF10: 0 - NO PAIN

## 2025-04-16 ASSESSMENT — PAIN DESCRIPTION - LOCATION: LOCATION: BACK

## 2025-04-16 ASSESSMENT — PAIN - FUNCTIONAL ASSESSMENT
PAIN_FUNCTIONAL_ASSESSMENT: 0-10
PAIN_FUNCTIONAL_ASSESSMENT: 0-10

## 2025-04-16 ASSESSMENT — PAIN DESCRIPTION - ORIENTATION: ORIENTATION: LOWER

## 2025-04-16 NOTE — NURSING NOTE
Report from Sending RN:    Report From: AYO Boles  Recent Surgery of Procedure: No  Baseline Level of Consciousness (LOC): A/O X 4  Oxygen Use: Yes, 2L SpO2 99%  Type: NC  Diabetic: Yes, Last  at 11:12  Last BP Med Given Day of Dialysis: none  Last Pain Med Given: none  Lab Tests to be Obtained with Dialysis: No  Blood Transfusion to be Given During Dialysis: No  Available IV Access: Yes  Medications to be Administered During Dialysis: Yes, EPO  Continuous IV Infusion Running: Yes  Restraints on Currently or in the Last 24 Hours: No  Hand-Off Communication: full code, no isolation, last set of vitals at 16:30: /59 HR 64 RR 20, pt is able to stand on a scale safely, travel by regular cart  Dialysis Catheter Dressing: will assess when the pt arrives to the HD unit  Last Dressing Change: will assess when the pt arrives to the HD unit

## 2025-04-16 NOTE — CARE PLAN
The patient's goals for the shift include      The clinical goals for the shift include pt will remain HDS during shift      Problem: Skin  Goal: Decreased wound size/increased tissue granulation at next dressing change  Outcome: Progressing  Goal: Participates in plan/prevention/treatment measures  Outcome: Progressing  Goal: Prevent/manage excess moisture  Outcome: Progressing  Goal: Prevent/minimize sheer/friction injuries  Outcome: Progressing  Goal: Promote/optimize nutrition  Outcome: Progressing  Goal: Promote skin healing  Outcome: Progressing     Problem: Pain - Adult  Goal: Verbalizes/displays adequate comfort level or baseline comfort level  Outcome: Progressing     Problem: Safety - Adult  Goal: Free from fall injury  Outcome: Progressing     Problem: Discharge Planning  Goal: Discharge to home or other facility with appropriate resources  Outcome: Progressing     Problem: Chronic Conditions and Co-morbidities  Goal: Patient's chronic conditions and co-morbidity symptoms are monitored and maintained or improved  Outcome: Progressing     Problem: Nutrition  Goal: Nutrient intake appropriate for maintaining nutritional needs  Outcome: Progressing     Problem: Fall/Injury  Goal: Not fall by end of shift  Outcome: Progressing  Goal: Be free from injury by end of the shift  Outcome: Progressing  Goal: Verbalize understanding of personal risk factors for fall in the hospital  Outcome: Progressing  Goal: Verbalize understanding of risk factor reduction measures to prevent injury from fall in the home  Outcome: Progressing  Goal: Use assistive devices by end of the shift  Outcome: Progressing  Goal: Pace activities to prevent fatigue by end of the shift  Outcome: Progressing     Problem: Pain  Goal: Takes deep breaths with improved pain control throughout the shift  Outcome: Progressing  Goal: Turns in bed with improved pain control throughout the shift  Outcome: Progressing  Goal: Walks with improved pain  control throughout the shift  Outcome: Progressing  Goal: Performs ADL's with improved pain control throughout shift  Outcome: Progressing  Goal: Participates in PT with improved pain control throughout the shift  Outcome: Progressing  Goal: Free from opioid side effects throughout the shift  Outcome: Progressing  Goal: Free from acute confusion related to pain meds throughout the shift  Outcome: Progressing     Problem: Diabetes  Goal: Achieve decreasing blood glucose levels by end of shift  Outcome: Progressing  Goal: Increase stability of blood glucose readings by end of shift  Outcome: Progressing  Goal: Decrease in ketones present in urine by end of shift  Outcome: Progressing  Goal: Maintain electrolyte levels within acceptable range throughout shift  Outcome: Progressing  Goal: Maintain glucose levels >70mg/dl to <250mg/dl throughout shift  Outcome: Progressing  Goal: No changes in neurological exam by end of shift  Outcome: Progressing  Goal: Learn about and adhere to nutrition recommendations by end of shift  Outcome: Progressing  Goal: Vital signs within normal range for age by end of shift  Outcome: Progressing  Goal: Increase self care and/or family involovement by end of shift  Outcome: Progressing  Goal: Receive DSME education by end of shift  Outcome: Progressing

## 2025-04-16 NOTE — DISCHARGE INSTRUCTIONS
Thank you for allowing us the Medicine team to participate in you healthcare.  You were admitted to the hospital for altered mental status and shortness of breath.  You were treated in the ICU with oxygen support, blood pressure medication control, and antibiotics.  Continue to take your old medications with the following changes:   Isosorbide mononitrate ER (Imdur) 30 mg once daily instead of 60 mg once daily until you see your primary care physician  Resume all other medications without a change  Please review the medication section below to see what changes were made to your regimen.  Please make sure to schedule follow-up appointments with your primary care physician, cardiologist, nephrologist, and transplant.

## 2025-04-16 NOTE — DISCHARGE SUMMARY
"Discharge Diagnosis  Shortness of breath    Issues Requiring Follow-Up  1) PCP:  - Post-hospital admission follow-up  - Imdur dose adjustment as tolerated, reduced on discharge from 60 mg to 30 mg daily given soft Bps  - Sleep apnea evaluation, referred to sleep medicine on discharge    2) cardiology  - Hypertension, HFpEF continued management    3) nephrology, transplant nephrology  - ESRD s/p DDKT x2, non-functioning now on iHD      Discharge Meds     Medication List      START taking these medications     lidocaine 4 % patch; Place 1 patch over 12 hours on the skin once daily   for 10 days. Remove & discard patch within 12 hours or as directed by MD.;   Start taking on: April 17, 2025   methocarbamol 500 mg tablet; Commonly known as: Robaxin; Take 1 tablet   (500 mg) by mouth every 8 hours for 10 days.     CHANGE how you take these medications     isosorbide mononitrate ER 30 mg 24 hr tablet; Commonly known as: Imdur;   Take 1 tablet (30 mg) by mouth once daily. Do not crush or chew. Do not   fill before April 17, 2025.; Start taking on: April 17, 2025; What   changed: medication strength, how much to take, additional instructions     CONTINUE taking these medications     acetaminophen 325 mg tablet; Commonly known as: Tylenol   BD Luer-Rita Syringe 3 mL 25 x 5/8\" syringe; Generic drug: syringe with   needle; Use to inject epogen.   carvedilol 12.5 mg tablet; Commonly known as: Coreg; Take 3 tablets   (37.5 mg) by mouth 2 times a day. Hold for systolic BP <140 and HR <60.   PATIENT NEEDS TO FOLLOW UP WITH CARDIOLOGY   cinacalcet 30 mg tablet; Commonly known as: Sensipar; Take 1 tablet (30   mg) by mouth once daily.   clotrimazole 1 % cream; Commonly known as: Lotrimin; Apply topically 2   times a day.   Cresemba 186 mg capsule capsule; Generic drug: isavuconazonium sulfate;   Take 2 capsules (372 mg) by mouth once daily. Take 2 capsules 3/6 at 10pm   and 2 capsules 3/7 6am, then starting 3/8 take 2 capsules once a " day   Deep Sea Nasal 0.65 % nasal spray; Generic drug: sodium chloride;   Administer 1 spray into each nostril 4 times a day as needed for   congestion.   Easy Touch Alcohol Prep Pads; Generic drug: alcohol swabs   Epogen 10,000 unit/mL injection; Generic drug: epoetin aida; Inject 1 mL   (10,000 Units) under the skin every 7 days. Do not start before April 17, 2024.   fluocinonide 0.05 % ointment; Commonly known as: Lidex; Apply to   affected areas twice daily when active as needed. Use less than 14 days   per month.   Gvoke HypoPen 1-Pack 1 mg/0.2 mL auto-injector; Generic drug: glucagon;   Inject 1 mg into the muscle every 15 minutes if needed (For blood glucose   41 to 70 mg/dL and no IV access).   imiquimod 5 % cream; Commonly known as: Aldara; Apply 1 packet topically   3 times a week.   insulin lispro 100 unit/mL pen; Commonly known as: HumaLOG; Inject 0-5   Units under the skin 3 times a day before meals. Take as directed per   insulin instructions.   Lantus Solostar U-100 Insulin 100 unit/mL (3 mL) pen; Generic drug:   insulin glargine; Inject 6 Units under the skin once daily in the morning.   Inject 6 units daily as directed, if glucose is above 150 increase by 2   units to a total of 8 units   metoclopramide 5 mg tablet; Commonly known as: Reglan; Take 1 tablet (5   mg) by mouth 3 times a day before meals.   neomycin-polymyxin-dexAMETHasone 3.5mg/mL-10,000 unit/mL-0.1 %   ophthalmic suspension; Commonly known as: Maxitrol; Administer 2 drops   into the left eye once daily in the morning.   NIFEdipine ER 90 mg 24 hr tablet; Commonly known as: Adalat CC; Take 1   tablet (90 mg) by mouth 2 times a day. Do not crush, chew, or split.   pantoprazole 40 mg EC tablet; Commonly known as: ProtoNix; Take 1 tablet   (40 mg) by mouth once daily in the morning. Take before meals. Do not   crush, chew, or split. Do not start before January 22, 2024.   pravastatin 10 mg tablet; Commonly known as: Pravachol; Take 1  "tablet   (10 mg) by mouth once daily at bedtime.   Renal Caps 1 mg capsule; Generic drug: vitamin B complex-vitamin C-folic   acid; Take 1 capsule by mouth once daily.   sevelamer carbonate 800 mg tablet; Commonly known as: Renvela; Take 1   tablet (800 mg) by mouth 3 times a day before meals. Swallow tablet whole;   do not crush, break, or chew.   * tacrolimus 0.5 mg capsule; Commonly known as: Prograf; Take 1 capsule   (0.5 mg) by mouth 2 times a day.   TRUEdraw Lancing Device misc; Generic drug: lancing device; use as   directed   TRUEplus Pen Needle 32 gauge x 5/32\" needle; Generic drug: pen needle,   diabetic; Use 4 a day as directed   * Unilet Super Thin Lancets 30 gauge misc; Generic drug: lancets; USE TO   TEST BLOOD SUGAR THREE TIMES A DAY   * Unilet Super Thin Lancets 30 gauge misc; Generic drug: lancets; 1 each   3 times a day.  * This list has 3 medication(s) that are the same as other medications   prescribed for you. Read the directions carefully, and ask your doctor or   other care provider to review them with you.     STOP taking these medications     amoxicillin-pot clavulanate 500-125 mg tablet; Commonly known as:   Augmentin   benzonatate 100 mg capsule; Commonly known as: Tessalon     ASK your doctor about these medications     ketorolac 0.5 % ophthalmic solution; Commonly known as: Acular; Instill   1 drop into left eye three times a day FOR USE AFTER CATARACT SURGERY   * tacrolimus 0.1 % ointment; Commonly known as: Protopic; Apply   topically 2 times a day.  * This list has 1 medication(s) that are the same as other medications   prescribed for you. Read the directions carefully, and ask your doctor or   other care provider to review them with you.       Test Results Pending At Discharge  Pending Labs       No current pending labs.            Hospital Course  Manolo Bashir is a 69 y.o. 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 diff (treated w/ vancomycin PO 10 day course EOT 2/6/25) presented to  ED with chief complaint of nausea, vomiting, chest pain and headache.     In the ED, vitals as follows: 247/134, 109, 40, 36.9 °C.  Initial labs as follows: Hgb 11.9 (baseline Hgb 9-10), platelets 122, , BUN 39, creatinine 10.72, BNP 2479, INR 1.1, troponin 64 and lipase 27.  CT abdomen pelvis without IV con demonstrated mild wall thickening involving small bowel in the left midabdomen concerning for enteritis, no evidence of bowel obstruction, interval enlargement of bilateral pleural effusions with mild bibasilar atelectasis, and mild ascites.  CT brain attack head without IV con without acute intracranial abnormality.  CXR demonstrated right basilar airspace opacity (combination of opacity and edema) concerning for possible pneumonia.     While in the ED a BAT was called at 1938 for altered mental status.  Prior to BAT, triage RN reports patient being oriented.  Neurology evaluated patient, NIHSS was 1 for mild dysarthria and had difficulty answering LOC question.  No seizure-like activity noted, CT head done and without any acute intracranial abnormality.  Presentation was not suggestive of neurovascular insult and that was canceled.  Altered mental status was attributed to hypertensive encephalopathy along with possible respiratory failure.     Patient was recently admitted to [facility] from 3/19-3/26/2025 after he was found to be , hypotensive , then transferred to MICU for urgent dialysis due to stenosed fistula and hyperkalemia (7.1 ). Additionally he complained of CP, and ECG demonstrated ST elevations in V2/V3. Patient with multiple admissions this year  With similar presentations on 1/26/2025, 2/5/2025, 3/6/2025.     Upon arrival to the floor, vital signs as follows: 111/63, 72, 20, 36.3 °C.  ANO x 2, oriented to person and place but not time.  Daughter at bedside during initial evaluation.  Patient demonstrated difficulty answering questions requiring multiple questioning, and difficulty with recollection of the events.  He reports headaches, lightheadedness, nausea , vomiting with acute onset this morning, and weakness.  Chest pain, cough, SOB, and abdominal pain.  He denies any diplopia, blurry vision, fever, chills, changes in diet, and pruritus.  Patient denies missing any HD sessions, last reported dialysis was this past Saturday.  Reports adherence to medications and confirmed with daughter at bedside.  SBP at home at 170 and normal DBP.  Reports taking isosorbide and nifedipine as directed however only takes carvedilol if SBP is greater than 140.    4/15 AM, he was sleeping comfortably in bed with O2 via NC at 2L saturating at 98%. Transplant nephrology were consulted for HD needs and his BP meds held I/s/o soft Bps. Trops were elevated but peaked at 199 and repeat EKGs were without ST changes. He was continued on his home Augmentin(stop date 4/16) and home Cresemba(stop date 6/5). He was doing fine and was ready for floor transfer.    4/16 Patient desatting intermittently when laying flat while sleeping which is concerning for sleep apnea.  When patient awoke he was weaned to room air.  He did have soft BPs so Imdur dose was decreased from 60 -> 30 mg daily until PCP follow-up.  He completed his course of Augmentin.  Physical therapy evaluated patient and he did not have any needs.    DAY OF DISCHARGE:    On the day of discharge, the patient was seen and evaluated by the Medicine team and deemed suitable for discharge to HOME. There were no significant events overnight.  Vitals were reviewed and within normal limits. Labs were stable at discharge.  Plan of  care for the day included ensuring that the patient had good p.o. intake, was ambulating well, and was stable.    I have spent in excess of 45 minutes in regards to chart review, treatment and medical management of this patient.       Pertinent Physical Exam At Time of Discharge  Physical Exam  General: well-appearing, NAD, resting comfortably in bed  HEENT: NC/AT  Cardiac: rrr, systolic murmur heard, LUE fistula well-appearing  Lungs: normal work of breathing, coarse breath sounds diffusely.  Abdomen: soft, non-tender, non-distended, BS present  Neuro: grossly intact  MSK: No peripheral edema, cyanosis, or pallor  Psych: no depression, anxiety      Outpatient Follow-Up  Future Appointments   Date Time Provider Department Center   4/22/2025  2:30 PM Elías Penn APRN-CNP [id number] [facility]   4/23/2025  1:40 PM Maciel Dominguez MD [id number] [place]   5/14/2025  9:40 AM Estella Quinonez MD [id number] [facility]   5/19/2025  1:00 PM Narayan Preston MD MPH [id number] [facility]   5/21/2025 10:40 AM Melia Qiu PA-C [id number] [facility]   6/2/2025 10:40 AM ANÍBAL Muro-CNP [id number] [facility]   6/18/2025 10:00 AM Vinicius Araiza MD [id number] [facility]   8/25/2025 11:00 AM Daxa Cote DPM [id number] [facility]   4/6/2026 10:20 AM Cali Mai MD [id number] [facility]         Dayan Banks MD

## 2025-04-16 NOTE — NURSING NOTE
Discharge note   Patient received discharge instructions and stated he understood with no questions. Patient iv discontinued in tact. Patient received assistance getting dressed to wait for patient transport to take patient to discharge lounge to take him home. Belongings taken with patient including clothing, cell phone and .  JESÚS DonnellyN, RN, [facility]

## 2025-04-16 NOTE — CONSULTS
NEPHROLOGY CONSULT NOTE   Patient ID: Manolo Bashir is a 69 y.o. male     Reason for consult: ESRD-HD  Evaluation of patient on dialysis at [facility]  [address]  [address]  [facility] [id number] on 4/14/2025  Chief Complaint   Patient presents with    Shortness of Breath        HPI  Manolo Bashir is a 69 y.o. male With past medical Hx as below admitted for   Nephrology was consulted for ESRD management     Renal Care:   ESRD_HD TTS at [facility] last HD there 4/12 - terminated early per pt request ; eDW 62 Kg ; post HD wt 68.8Kg     From H&P:   Manolo Bashir is a 69 y.o. 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 difficile (Tx w/ Vanc PO, 2/6/25) presented with nausea, vomiting, and SOB, found to be acutely altered with minimal responses, profound tachypnea to the 50s and hypoxia to low 80s. Neurology consulted, NIHSS 1 for mild dysarthria and difficulty answering LOC questions thus leading to BAT activation. Altered mental status workup largely unremarkable, acute change in mentation thought to be secondary to hypertensive encephalopathy with possible respiratory component. Chest x-ray demonstrated pulmonary edema which was largely driven by hypertensive emergency, Pt treated with nifedipine ER 90 mg, nicardipine and nitroglycerin drip with subsequent improvement in respiratory status and mentation. Patient transferred to the MICU for hypertensive emergency and need for dialysis.     Had HD yesterday with 2.5L UF with significant improvement in symptoms  and BP     In The ER: /53 (BP Location: Right arm, Patient Position: Lying)   Pulse 74   Temp 36.8 °C (98.2  "°F) (Temporal)   Resp 16   Ht 1.727 m (5' 7.99\")   Wt 68.9 kg (151 lb 14.4 oz)   SpO2 96%   BMI 23.10 kg/m²      ROS: all negative except HPI   Medical History[1]   Surgical History[2]   Family History[3]  Social History     Socioeconomic History    Marital status: Single     Spouse name: Not on file    Number of children: Not on file    Years of education: Not on file    Highest education level: Not on file   Occupational History    Not on file   Tobacco Use    Smoking status: Never     Passive exposure: Past    Smokeless tobacco: Never   Vaping Use    Vaping status: Never Used   Substance and Sexual Activity    Alcohol use: Never    Drug use: Not on file    Sexual activity: Defer   Other Topics Concern    Not on file   Social History Narrative    Not on file     Social Drivers of Health     Financial Resource Strain: Low Risk  (4/15/2025)    Overall Financial Resource Strain (CARDIA)     Difficulty of Paying Living Expenses: Not hard at all   Food Insecurity: No Food Insecurity (4/15/2025)    Hunger Vital Sign     Worried About Running Out of Food in the Last Year: Never true     Ran Out of Food in the Last Year: Never true   Transportation Needs: No Transportation Needs (4/15/2025)    PRAPARE - Transportation     Lack of Transportation (Medical): No     Lack of Transportation (Non-Medical): No   Physical Activity: Sufficiently Active (1/5/2025)    Exercise Vital Sign     Days of Exercise per Week: 7 days     Minutes of Exercise per Session: 60 min   Stress: No Stress Concern Present (3/1/2025)    [employer] [place] [employer] - Occupational Stress Questionnaire     Feeling of Stress : Not at all   Social Connections: Socially Isolated (1/5/2025)    Social Connection and Isolation Panel [NHANES]     Frequency of Communication with Friends and Family: More than three times a week     Frequency of Social Gatherings with Friends and Family: More than three times a week     Attends Spiritism Services: " Never     Active Member of Clubs or Organizations: No     Attends Club or Organization Meetings: Never     Marital Status: Never    Intimate Partner Violence: Not At Risk (4/15/2025)    Humiliation, Afraid, Rape, and Kick questionnaire     Fear of Current or Ex-Partner: No     Emotionally Abused: No     Physically Abused: No     Sexually Abused: No   Housing Stability: Low Risk  (4/15/2025)    Housing Stability Vital Sign     Unable to Pay for Housing in the Last Year: No     Number of Times Moved in the Last Year: 0     Homeless in the Last Year: No   Recent Concern: Housing Stability - High Risk (3/1/2025)    Housing Stability Vital Sign     Unable to Pay for Housing in the Last Year: Yes     Number of Times Moved in the Last Year: 0     Homeless in the Last Year: No     Allergies[4]       Prior to Admission Medications   Prescriptions Last Dose Informant Patient Reported? Taking?   Easy Touch Alcohol Prep Pads pads, medicated  Friend Yes No   NIFEdipine ER (Adalat CC) 90 mg 24 hr tablet   No No   Sig: Take 1 tablet (90 mg) by mouth 2 times a day. Do not crush, chew, or split.   Unilet Super Thin Lancets 30 gauge misc  Friend No No   Si each 3 times a day.   acetaminophen (Tylenol) 325 mg tablet  Friend Yes No   Sig: Take 3 tablets (975 mg) by mouth every 6 hours if needed for mild pain (1 - 3).   amoxicillin-pot clavulanate (Augmentin) 500-125 mg tablet   No No   Sig: Take 1 tablet by mouth once every 24 hours for 42 doses. Take at night, after dialysis.   benzonatate (Tessalon) 100 mg capsule   No No   Sig: Take 1 capsule (100 mg) by mouth 3 times a day as needed for cough. Do not crush or chew.   carvedilol (Coreg) 12.5 mg tablet   No No   Sig: Take 3 tablets (37.5 mg) by mouth 2 times a day. Hold for systolic BP <140 and HR <60. PATIENT NEEDS TO FOLLOW UP WITH CARDIOLOGY   cinacalcet (Sensipar) 30 mg tablet  Friend No No   Sig: Take 1 tablet (30 mg) by mouth once daily.   clotrimazole (Lotrimin) 1 %  cream   No No   Sig: Apply topically 2 times a day.   epoetin aida 10,000 unit/mL injection  Friend No No   Sig: Inject 1 mL (10,000 Units) under the skin every 7 days. Do not start before April 17, 2024.   fluocinonide (Lidex) 0.05 % ointment   No No   Sig: Apply to affected areas twice daily when active as needed. Use less than 14 days per month.   glucagon (Gvoke HypoPen 1-Pack) 1 mg/0.2 mL auto-injector  Friend No No   Sig: Inject 1 mg into the muscle every 15 minutes if needed (For blood glucose 41 to 70 mg/dL and no IV access).   imiquimod (Aldara) 5 % cream  Friend No No   Sig: Apply 1 packet topically 3 times a week.   insulin glargine (Lantus) 100 unit/mL (3 mL) pen   No No   Sig: Inject 6 Units under the skin once daily in the morning. Inject 6 units daily as directed, if glucose is above 150 increase by 2 units to a total of 8 units   insulin lispro (HumaLOG) 100 unit/mL pen   No No   Sig: Inject 0-5 Units under the skin 3 times a day before meals. Take as directed per insulin instructions.   isavuconazonium sulfate (Cresemba) 186 mg capsule capsule   No No   Sig: Take 2 capsules (372 mg) by mouth once daily. Take 2 capsules 3/6 at 10pm and 2 capsules 3/7 6am, then starting 3/8 take 2 capsules once a day   isosorbide mononitrate ER (Imdur) 60 mg 24 hr tablet   No No   Sig: Take 1 tablet (60 mg) by mouth once daily.   ketorolac (Acular) 0.5 % ophthalmic solution  Friend No No   Sig: Instill 1 drop into left eye three times a day FOR USE AFTER CATARACT SURGERY   Patient not taking: Reported on 3/31/2025   lancets (Unilet Super Thin Lancets) 30 gauge misc  Friend No No   Sig: USE TO TEST BLOOD SUGAR THREE TIMES A DAY   lancing device misc  Friend No No   Sig: use as directed   metoclopramide (Reglan) 5 mg tablet  Friend No No   Sig: Take 1 tablet (5 mg) by mouth 3 times a day before meals.   neomycin-polymyxin-dexAMETHasone (Maxitrol) 3.5mg/mL-10,000 unit/mL-0.1 % ophthalmic suspension   No No   Sig:  "Administer 2 drops into the left eye once daily in the morning.   pantoprazole (ProtoNix) 40 mg EC tablet  Friend No No   Sig: Take 1 tablet (40 mg) by mouth once daily in the morning. Take before meals. Do not crush, chew, or split. Do not start before January 22, 2024.   pen needle, diabetic (TechLITE Pen Needle) 32 gauge x 5/32\" needle  Friend No No   Sig: Use 4 a day as directed   pravastatin (Pravachol) 10 mg tablet  Friend No No   Sig: Take 1 tablet (10 mg) by mouth once daily at bedtime.   sevelamer carbonate (Renvela) 800 mg tablet  Friend No No   Sig: Take 1 tablet (800 mg) by mouth 3 times a day before meals. Swallow tablet whole; do not crush, break, or chew.   sodium chloride (Ocean) 0.65 % nasal spray   No No   Sig: Administer 1 spray into each nostril 4 times a day as needed for congestion.   syringe with needle 3 mL 25 x 5/8\" syringe  Friend No No   Sig: Use to inject epogen.   tacrolimus (Prograf) 0.5 mg capsule  Friend No No   Sig: Take 1 capsule (0.5 mg) by mouth 2 times a day.   tacrolimus (Protopic) 0.1 % ointment  Friend No No   Sig: Apply topically 2 times a day.   Patient not taking: Reported on 2/28/2025   vitamin B complex-vitamin C-folic acid (Nephrocaps) 1 mg capsule  Friend No No   Sig: Take 1 capsule by mouth once daily.      Facility-Administered Medications: None          Scheduled medications  Scheduled Medications[5]  Continuous Medications[6]  PRN Medications[7]     Heart Rate:  [62-77]   Temp:  [36.2 °C (97.2 °F)-37.7 °C (99.9 °F)]   Resp:  [16-26]   BP: (104-131)/(52-67)   SpO2:  [96 %-100 %]    Weight: 68.9 kg (151 lb 14.4 oz)   General appearance: Awake and alert, oriented, . No distress  HEENT: moist oral mucosa, no mouth ulcers  Neck: supple, No JVD  Skin: no apparent rash  Heart: heart sounds 1 & 2 present and normal, nofriction rub  Lungs: Adequate air entry,  no wheezing/crackles  Abdomen: soft, non tender, no masses palpated, no flank tenderness  Extremities: No  edema, no " joint swelling,  : deferred  Neuro: No FND, no asterixis   ACCESS: RT IJ TC ; also lAVG with 2 large aneurisms ;with thrill and bruit - not currently in use     Results from last 72 hours   Lab Units 04/16/25  0735 04/16/25  0715   SODIUM mmol/L  --  138   POTASSIUM mmol/L  --  5.0   CO2 mmol/L  --  29   BUN mg/dL  --  27*   CREATININE mg/dL  --  7.65*   PHOSPHORUS mg/dL 4.4  --    CALCIUM mg/dL  --  8.7   ALBUMIN g/dL  --  3.1*   GLUCOSE mg/dL  --  172*   HEMOGLOBIN g/dL  --  9.1*   WBC AUTO x10*3/uL  --  3.2*      Lab Results   Component Value Date    GLUCOSE 172 (H) 04/16/2025    CALCIUM 8.7 04/16/2025     04/16/2025    K 5.0 04/16/2025    CO2 29 04/16/2025     04/16/2025    BUN 27 (H) 04/16/2025    CREATININE 7.65 (H) 04/16/2025       Assessment   ESRD-HD admitted with AMS found to have HTN urgency requiring HD   Tolerated HD yesterday with 2.5L UF ; this AM clinically euvolemic - may need eDW adjustment at [facility]   Plan   Plan HD tomorrow per submitted orders, UF as tolerated  MBD - Phosphorus binder: continue with Sevelamer AC  Anemia of CKD: EPO to be given x 3 per week   Access: AVG issues - to follow-up with vascular as op  BP: acceptable this AM  Renal Diet   Daily renal MVI   Continue 3 x per week hemodialysis; follow-up with TI for listing  purposes     Татьяна Yates MD MPH            [1]   Past Medical History:  Diagnosis Date    Anemia     Arthritis     Cataract     Chronic kidney disease, stage 3 unspecified (Multi) 09/26/2018    Stage 3 chronic kidney disease    CKD (chronic kidney disease)     stage V    Cough 02/12/2024    COVID-19 06/18/2020    COVID-19 virus infection    Diabetes (Multi)     ESRD (end stage renal disease) (Multi)     Focal and segmental proliferative glomerulonephritis 12/23/2023    HTN (hypertension)     Hyperlipidemia     Other long term (current) drug therapy 07/20/2021    High risk medication use    Personal history of other diseases of the circulatory system      Personal history of cardiac murmur    Personal history of other infectious and parasitic diseases 08/17/2015    History of hepatitis    Polyp, colonic 08/17/2023    Primary osteoarthritis of both ankles 08/17/2023    Prostate cancer (Multi)     Tubular adenoma of colon 08/17/2023    Unspecified kidney failure 08/17/2016    Renal failure   [2]   Past Surgical History:  Procedure Laterality Date    ILEOSTOMY  04/25/2017    Ileostomy    ILEOSTOMY CLOSURE  08/17/2015    Ileostomy Closure    OTHER SURGICAL HISTORY  04/21/2017    Right Hemicolectomy    OTHER SURGICAL HISTORY  08/17/2015    Arteriovenous Surgery Creation Of A-V Fistula    OTHER SURGICAL HISTORY  08/17/2015    Sigmoidoscopy (Fiberoptic, Therapeutic )    PROSTATECTOMY  10/11/2013    Prostatectomy Radical    TRANSPLANT, KIDNEY, OPEN  1992    TRANSPLANT, KIDNEY, OPEN  2013    US GUIDED PERCUTANEOUS BIOPSY RENAL LEFT Left 11/20/2023    US GUIDED PERCUTANEOUS BIOPSY RENAL LEFT 11/20/2023 Lou Rodgers MD [facility]    US GUIDED PERCUTANEOUS PERITONEAL OR RETROPERITONEAL FLUID COLLECTION DRAINAGE  10/20/2022    US GUIDED PERCUTANEOUS PERITONEAL OR RETROPERITONEAL FLUID COLLECTION DRAINAGE 10/20/2022 [facility] CL[facility]ICAL LEGACY   [3]   Family History  Problem Relation Name Age of Onset    Bone cancer Mother      Other (corona's sarcome of the bone marrow) Mother      Prostate cancer Father      Diabetes Other Family Hist     Hypertension Other Family Hist    [4] No Known Allergies  [5] carvedilol, 37.5 mg, oral, BID  cinacalcet, 30 mg, oral, Daily  clotrimazole, , Topical, BID  [START ON 4/17/2025] epoetin aida, 10,000 Units, intravenous, Once per day on Tuesday Thursday Saturday  heparin (porcine), 5,000 Units, subcutaneous, q8h  isavuconazonium sulfate, 372 mg, oral, Daily  [START ON 4/17/2025] isosorbide mononitrate ER, 30 mg, oral, Daily  ketorolac, 1 drop, Left Eye, TID  lidocaine, 1 patch, transdermal, Daily  methocarbamol, 500 mg, oral, q8h ZOË  metoclopramide, 5 mg,  oral, TID AC  neomycin-polymyxin-dexAMETHasone, 2 drop, Left Eye, q AM  NIFEdipine ER, 90 mg, oral, BID  pravastatin, 10 mg, oral, Nightly  sevelamer carbonate, 800 mg, oral, TID AC  tacrolimus, 0.5 mg, oral, BID  [Held by provider] tacrolimus, , Topical, BID  vitamin B complex-vitamin C-folic acid, 1 capsule, oral, Daily  [6]    [7] PRN medications: acetaminophen, acetaminophen, benzonatate, dextrose, dextrose, glucagon, glucagon, sodium chloride

## 2025-04-16 NOTE — PROGRESS NOTES
04/16/25 1519   Discharge Planning   Home or Post Acute Services None   Expected Discharge Disposition Home     Transitional Care Coordination Progress Note:  Patient discussed during interdisciplinary rounds.  Team members present: ABIDA ZABALA  Plan per Medical/Surgical team: Pt presenting with chief complaint of nausea, vomiting, chest pain and headache. Pt was admitted to MICU for HTN ER requiring urgent dialysis and is s/p transfer. Per attending he is medically ready for discharge home with no additional home going needs.  Payer: [employer]  Status: Inpatient  Discharge disposition: Home  Potential Barriers: None  ADOD: 4/16  Care coordinator will continue to follow for discharge planning needs.     Joyce López RN  Transitional Care Coordinator (TCC)  [phone] or [phone]

## 2025-04-16 NOTE — NURSING NOTE
Report to Receiving RN:    Report To: AYO Orta  Time Report Called: 20:36  Hand-Off Communication: tolerated tx well, no issue or concerns, removed 2.5L of fluid, post tx blood pressure 130/61 HR 69, alert and stable post tx  Complications During Treatment: No  Ultrafiltration Treatment: No  Medications Administered During Dialysis: Unable to give EPO d/t med  was out of stock  Blood Products Administered During Dialysis: No  Labs Sent During Dialysis: No  Heparin Drip Rate Changes: N/A  Dialysis Catheter Dressing: C/D/I  Last Dressing Change: 4/15/25      Last Updated: 8:36 PM by DES SILVESTRE

## 2025-04-18 DIAGNOSIS — R91.8 GROUND GLASS OPACITY PRESENT ON IMAGING OF LUNG: ICD-10-CM

## 2025-04-18 LAB
ATRIAL RATE: 66 BPM
P AXIS: 63 DEGREES
P OFFSET: 189 MS
P ONSET: 130 MS
PR INTERVAL: 166 MS
Q ONSET: 213 MS
QRS COUNT: 11 BEATS
QRS DURATION: 138 MS
QT INTERVAL: 468 MS
QTC CALCULATION(BAZETT): 490 MS
QTC FREDERICIA: 483 MS
R AXIS: -31 DEGREES
T AXIS: -26 DEGREES
T OFFSET: 447 MS
VENTRICULAR RATE: 66 BPM

## 2025-04-21 ENCOUNTER — APPOINTMENT (OUTPATIENT)
Dept: HEMATOLOGY/ONCOLOGY | Facility: CLINIC | Age: 70
End: 2025-04-21
Payer: MEDICARE

## 2025-04-22 ENCOUNTER — TELEPHONE (OUTPATIENT)
Dept: HEMATOLOGY/ONCOLOGY | Facility: CLINIC | Age: 70
End: 2025-04-22
Payer: MEDICAID

## 2025-04-22 ENCOUNTER — APPOINTMENT (OUTPATIENT)
Dept: HEMATOLOGY/ONCOLOGY | Facility: CLINIC | Age: 70
End: 2025-04-22
Payer: MEDICARE

## 2025-04-22 NOTE — TELEPHONE ENCOUNTER
Reason for Conversation  FUV Jose today    Background   Call placed to patient to advise to have labs drawn today and Jose Fili is moving his virtual appt from today to Friday 4/25/25.  Pt reports he is going to [facility] today for blood work.  Encouraged pt to go to the [facility] lab location.  Patient verbalizes understanding of plan of care via teachback method.       Disposition   Pt aware of new appt with cruzito Gacria 5/2/25 @ 1:00.

## 2025-04-23 ENCOUNTER — APPOINTMENT (OUTPATIENT)
Dept: NEPHROLOGY | Facility: CLINIC | Age: 70
End: 2025-04-23
Payer: MEDICARE

## 2025-04-25 ENCOUNTER — LAB (OUTPATIENT)
Dept: LAB | Facility: HOSPITAL | Age: 70
End: 2025-04-25
Payer: COMMERCIAL

## 2025-04-25 DIAGNOSIS — D47.2 MGUS (MONOCLONAL GAMMOPATHY OF UNKNOWN SIGNIFICANCE): ICD-10-CM

## 2025-04-25 DIAGNOSIS — E21.3 HYPERPARATHYROIDISM (MULTI): ICD-10-CM

## 2025-04-25 DIAGNOSIS — Z94.0 KIDNEY TRANSPLANTED (HHS-HCC): ICD-10-CM

## 2025-04-25 DIAGNOSIS — R91.8 GROUND GLASS OPACITY PRESENT ON IMAGING OF LUNG: ICD-10-CM

## 2025-04-25 LAB
25(OH)D3 SERPL-MCNC: 21 NG/ML (ref 30–100)
ABO GROUP (TYPE) IN BLOOD: NORMAL
ALBUMIN SERPL BCP-MCNC: 3.6 G/DL (ref 3.4–5)
ALP SERPL-CCNC: 87 U/L (ref 33–136)
ALT SERPL W P-5'-P-CCNC: 12 U/L (ref 10–52)
ANION GAP SERPL CALC-SCNC: 14 MMOL/L (ref 10–20)
ANTIBODY SCREEN: NORMAL
AST SERPL W P-5'-P-CCNC: 16 U/L (ref 9–39)
BASOPHILS # BLD AUTO: 0.02 X10*3/UL (ref 0–0.1)
BASOPHILS NFR BLD AUTO: 0.4 %
BILIRUB SERPL-MCNC: 0.5 MG/DL (ref 0–1.2)
BUN SERPL-MCNC: 30 MG/DL (ref 6–23)
CALCIUM SERPL-MCNC: 9.7 MG/DL (ref 8.6–10.3)
CHLORIDE SERPL-SCNC: 98 MMOL/L (ref 98–107)
CO2 SERPL-SCNC: 26 MMOL/L (ref 21–32)
CREAT SERPL-MCNC: 8 MG/DL (ref 0.5–1.3)
EGFRCR SERPLBLD CKD-EPI 2021: 7 ML/MIN/1.73M*2
EOSINOPHIL # BLD AUTO: 0.09 X10*3/UL (ref 0–0.7)
EOSINOPHIL NFR BLD AUTO: 1.9 %
ERYTHROCYTE [DISTWIDTH] IN BLOOD BY AUTOMATED COUNT: 16.3 % (ref 11.5–14.5)
GLUCOSE SERPL-MCNC: 298 MG/DL (ref 74–99)
HCT VFR BLD AUTO: 34.3 % (ref 41–52)
HGB BLD-MCNC: 10.8 G/DL (ref 13.5–17.5)
IGA SERPL-MCNC: 946 MG/DL (ref 70–400)
IGG SERPL-MCNC: 1580 MG/DL (ref 700–1600)
IGM SERPL-MCNC: 25 MG/DL (ref 40–230)
IMM GRANULOCYTES # BLD AUTO: 0.02 X10*3/UL (ref 0–0.7)
IMM GRANULOCYTES NFR BLD AUTO: 0.4 % (ref 0–0.9)
LYMPHOCYTES # BLD AUTO: 0.53 X10*3/UL (ref 1.2–4.8)
LYMPHOCYTES NFR BLD AUTO: 11.3 %
MCH RBC QN AUTO: 27.8 PG (ref 26–34)
MCHC RBC AUTO-ENTMCNC: 31.5 G/DL (ref 32–36)
MCV RBC AUTO: 88 FL (ref 80–100)
MONOCYTES # BLD AUTO: 0.32 X10*3/UL (ref 0.1–1)
MONOCYTES NFR BLD AUTO: 6.8 %
NEUTROPHILS # BLD AUTO: 3.71 X10*3/UL (ref 1.2–7.7)
NEUTROPHILS NFR BLD AUTO: 79.2 %
NRBC BLD-RTO: 0 /100 WBCS (ref 0–0)
PHOSPHATE SERPL-MCNC: 4.6 MG/DL (ref 2.5–4.9)
PLATELET # BLD AUTO: 102 X10*3/UL (ref 150–450)
POTASSIUM SERPL-SCNC: 4.6 MMOL/L (ref 3.5–5.3)
PROT SERPL-MCNC: 7.3 G/DL (ref 6.4–8.2)
PROT SERPL-MCNC: 7.5 G/DL (ref 6.4–8.2)
RBC # BLD AUTO: 3.89 X10*6/UL (ref 4.5–5.9)
RH FACTOR (ANTIGEN D): NORMAL
SODIUM SERPL-SCNC: 133 MMOL/L (ref 136–145)
TACROLIMUS BLD-MCNC: <2 NG/ML
WBC # BLD AUTO: 4.7 X10*3/UL (ref 4.4–11.3)

## 2025-04-25 PROCEDURE — 86320 SERUM IMMUNOELECTROPHORESIS: CPT | Performed by: STUDENT IN AN ORGANIZED HEALTH CARE EDUCATION/TRAINING PROGRAM

## 2025-04-25 PROCEDURE — 84155 ASSAY OF PROTEIN SERUM: CPT | Mod: 59

## 2025-04-25 PROCEDURE — 83970 ASSAY OF PARATHORMONE: CPT

## 2025-04-25 PROCEDURE — 83521 IG LIGHT CHAINS FREE EACH: CPT

## 2025-04-25 PROCEDURE — 84100 ASSAY OF PHOSPHORUS: CPT | Performed by: HOSPITALIST

## 2025-04-25 PROCEDURE — 82306 VITAMIN D 25 HYDROXY: CPT

## 2025-04-25 PROCEDURE — 86334 IMMUNOFIX E-PHORESIS SERUM: CPT

## 2025-04-25 PROCEDURE — 82784 ASSAY IGA/IGD/IGG/IGM EACH: CPT

## 2025-04-25 PROCEDURE — 80197 ASSAY OF TACROLIMUS: CPT | Performed by: HOSPITALIST

## 2025-04-25 PROCEDURE — 84075 ASSAY ALKALINE PHOSPHATASE: CPT

## 2025-04-25 PROCEDURE — 84165 PROTEIN E-PHORESIS SERUM: CPT | Performed by: STUDENT IN AN ORGANIZED HEALTH CARE EDUCATION/TRAINING PROGRAM

## 2025-04-25 PROCEDURE — 86901 BLOOD TYPING SEROLOGIC RH(D): CPT | Performed by: TRANSPLANT SURGERY

## 2025-04-25 PROCEDURE — 85025 COMPLETE CBC W/AUTO DIFF WBC: CPT

## 2025-04-25 PROCEDURE — 36415 COLL VENOUS BLD VENIPUNCTURE: CPT

## 2025-04-27 LAB
KAPPA LC SERPL-MCNC: 31.94 MG/DL (ref 0.33–1.94)
KAPPA LC/LAMBDA SER: 0.85 {RATIO} (ref 0.26–1.65)
LAMBDA LC SERPL-MCNC: 37.47 MG/DL (ref 0.57–2.63)

## 2025-04-28 LAB — PTH-INTACT SERPL-MCNC: 399 PG/ML (ref 18.5–88)

## 2025-04-29 LAB
ALBUMIN: 3.6 G/DL (ref 3.4–5)
ALPHA 1 GLOBULIN: 0.3 G/DL (ref 0.2–0.6)
ALPHA 2 GLOBULIN: 0.6 G/DL (ref 0.4–1.1)
BETA GLOBULIN: 1.4 G/DL (ref 0.5–1.2)
GAMMA GLOBULIN: 1.4 G/DL (ref 0.5–1.4)
IMMUNOFIXATION COMMENT: ABNORMAL
M-PROTEIN 1: 0.3 G/DL (ref ?–0)
M-PROTEIN 2: 0.2 G/DL (ref ?–0)
M-PROTEIN 3: 0.2 G/DL (ref ?–0)
PATH REVIEW - SERUM IMMUNOFIXATION: ABNORMAL
PATH REVIEW-SERUM PROTEIN ELECTROPHORESIS: ABNORMAL
PROTEIN ELECTROPHORESIS COMMENT: ABNORMAL

## 2025-04-30 ENCOUNTER — OFFICE VISIT (OUTPATIENT)
Dept: PRIMARY CARE | Facility: CLINIC | Age: 70
End: 2025-04-30
Payer: COMMERCIAL

## 2025-04-30 ENCOUNTER — PHARMACY VISIT (OUTPATIENT)
Dept: PHARMACY | Facility: CLINIC | Age: 70
End: 2025-04-30
Payer: COMMERCIAL

## 2025-04-30 VITALS
OXYGEN SATURATION: 100 % | WEIGHT: 146.8 LBS | SYSTOLIC BLOOD PRESSURE: 121 MMHG | HEIGHT: 67 IN | HEART RATE: 71 BPM | DIASTOLIC BLOOD PRESSURE: 66 MMHG | BODY MASS INDEX: 23.04 KG/M2 | TEMPERATURE: 96.7 F | RESPIRATION RATE: 16 BRPM

## 2025-04-30 DIAGNOSIS — M25.511 ACUTE PAIN OF RIGHT SHOULDER: Primary | ICD-10-CM

## 2025-04-30 DIAGNOSIS — R06.02 SHORTNESS OF BREATH: ICD-10-CM

## 2025-04-30 DIAGNOSIS — Z94.0 KIDNEY REPLACED BY TRANSPLANT (HHS-HCC): ICD-10-CM

## 2025-04-30 DIAGNOSIS — Z00.00 ROUTINE GENERAL MEDICAL EXAMINATION AT HEALTH CARE FACILITY: ICD-10-CM

## 2025-04-30 PROCEDURE — 1111F DSCHRG MED/CURRENT MED MERGE: CPT | Performed by: NURSE PRACTITIONER

## 2025-04-30 PROCEDURE — 1170F FXNL STATUS ASSESSED: CPT | Performed by: NURSE PRACTITIONER

## 2025-04-30 PROCEDURE — 3008F BODY MASS INDEX DOCD: CPT | Performed by: NURSE PRACTITIONER

## 2025-04-30 PROCEDURE — 3078F DIAST BP <80 MM HG: CPT | Performed by: NURSE PRACTITIONER

## 2025-04-30 PROCEDURE — 1159F MED LIST DOCD IN RCRD: CPT | Performed by: NURSE PRACTITIONER

## 2025-04-30 PROCEDURE — 99215 OFFICE O/P EST HI 40 MIN: CPT | Performed by: NURSE PRACTITIONER

## 2025-04-30 PROCEDURE — 1125F AMNT PAIN NOTED PAIN PRSNT: CPT | Performed by: NURSE PRACTITIONER

## 2025-04-30 PROCEDURE — RXMED WILLOW AMBULATORY MEDICATION CHARGE

## 2025-04-30 PROCEDURE — 3074F SYST BP LT 130 MM HG: CPT | Performed by: NURSE PRACTITIONER

## 2025-04-30 RX ORDER — OXYCODONE HYDROCHLORIDE 5 MG/1
5 TABLET ORAL EVERY 4 HOURS PRN
Qty: 15 TABLET | Refills: 0 | Status: SHIPPED | OUTPATIENT
Start: 2025-04-30 | End: 2025-05-07

## 2025-04-30 RX ORDER — NALOXONE HYDROCHLORIDE 4 MG/.1ML
4 SPRAY NASAL AS NEEDED
Qty: 2 EACH | Refills: 0 | Status: SHIPPED | OUTPATIENT
Start: 2025-04-30

## 2025-04-30 RX ORDER — ACETAMINOPHEN 500 MG
1000 TABLET ORAL EVERY 6 HOURS PRN
Qty: 30 TABLET | Refills: 0 | Status: SHIPPED | OUTPATIENT
Start: 2025-04-30 | End: 2025-05-10

## 2025-04-30 RX ORDER — ISOSORBIDE MONONITRATE 30 MG/1
30 TABLET, EXTENDED RELEASE ORAL DAILY
Qty: 30 TABLET | Refills: 0 | Status: SHIPPED | OUTPATIENT
Start: 2025-04-30 | End: 2025-05-30

## 2025-04-30 RX ORDER — PREDNISONE 5 MG/1
5 TABLET ORAL DAILY
Qty: 30 TABLET | Refills: 11 | Status: SHIPPED | OUTPATIENT
Start: 2025-04-30 | End: 2026-04-30

## 2025-04-30 RX ORDER — TACROLIMUS 0.5 MG/1
0.5 CAPSULE ORAL 2 TIMES DAILY
Qty: 60 CAPSULE | Refills: 11 | Status: SHIPPED | OUTPATIENT
Start: 2025-04-30 | End: 2026-04-30

## 2025-04-30 SDOH — ECONOMIC STABILITY: FOOD INSECURITY: WITHIN THE PAST 12 MONTHS, YOU WORRIED THAT YOUR FOOD WOULD RUN OUT BEFORE YOU GOT MONEY TO BUY MORE.: NEVER TRUE

## 2025-04-30 SDOH — ECONOMIC STABILITY: FOOD INSECURITY: WITHIN THE PAST 12 MONTHS, THE FOOD YOU BOUGHT JUST DIDN'T LAST AND YOU DIDN'T HAVE MONEY TO GET MORE.: NEVER TRUE

## 2025-04-30 ASSESSMENT — ENCOUNTER SYMPTOMS
DEPRESSION: 0
LOSS OF SENSATION IN FEET: 0
OCCASIONAL FEELINGS OF UNSTEADINESS: 0

## 2025-04-30 ASSESSMENT — PATIENT HEALTH QUESTIONNAIRE - PHQ9
10. IF YOU CHECKED OFF ANY PROBLEMS, HOW DIFFICULT HAVE THESE PROBLEMS MADE IT FOR YOU TO DO YOUR WORK, TAKE CARE OF THINGS AT HOME, OR GET ALONG WITH OTHER PEOPLE: SOMEWHAT DIFFICULT
3. TROUBLE FALLING OR STAYING ASLEEP OR SLEEPING TOO MUCH: SEVERAL DAYS
2. FEELING DOWN, DEPRESSED OR HOPELESS: NOT AT ALL
6. FEELING BAD ABOUT YOURSELF - OR THAT YOU ARE A FAILURE OR HAVE LET YOURSELF OR YOUR FAMILY DOWN: NOT AT ALL
8. MOVING OR SPEAKING SO SLOWLY THAT OTHER PEOPLE COULD HAVE NOTICED. OR THE OPPOSITE, BEING SO FIGETY OR RESTLESS THAT YOU HAVE BEEN MOVING AROUND A LOT MORE THAN USUAL: NOT AT ALL
4. FEELING TIRED OR HAVING LITTLE ENERGY: SEVERAL DAYS
9. THOUGHTS THAT YOU WOULD BE BETTER OFF DEAD, OR OF HURTING YOURSELF: NOT AT ALL
5. POOR APPETITE OR OVEREATING: SEVERAL DAYS
SUM OF ALL RESPONSES TO PHQ9 QUESTIONS 1 AND 2: 1
1. LITTLE INTEREST OR PLEASURE IN DOING THINGS: SEVERAL DAYS
SUM OF ALL RESPONSES TO PHQ QUESTIONS 1-9: 4
7. TROUBLE CONCENTRATING ON THINGS, SUCH AS READING THE NEWSPAPER OR WATCHING TELEVISION: NOT AT ALL

## 2025-04-30 ASSESSMENT — ACTIVITIES OF DAILY LIVING (ADL)
GROCERY_SHOPPING: NEEDS ASSISTANCE
GROCERY_SHOPPING: NEEDS ASSISTANCE
MANAGING_FINANCES: INDEPENDENT
DRESSING: NEEDS ASSISTANCE
TAKING_MEDICATION: INDEPENDENT
DOING_HOUSEWORK: NEEDS ASSISTANCE
TAKING_MEDICATION: INDEPENDENT
DOING_HOUSEWORK: INDEPENDENT
BATHING: INDEPENDENT
MANAGING_FINANCES: INDEPENDENT
BATHING: NEEDS ASSISTANCE
DRESSING: INDEPENDENT

## 2025-04-30 ASSESSMENT — LIFESTYLE VARIABLES: HOW MANY STANDARD DRINKS CONTAINING ALCOHOL DO YOU HAVE ON A TYPICAL DAY: PATIENT DOES NOT DRINK

## 2025-04-30 ASSESSMENT — PAIN SCALES - GENERAL: PAINLEVEL_OUTOF10: 7

## 2025-04-30 NOTE — PROGRESS NOTES
"Subjective   Reason for Visit: Manolo Bashir is an 69 y.o. male here for a Medicare Wellness visit.      Reviewed all medications by prescribing practitioner or clinical pharmacist (such as prescriptions, OTCs, herbal therapies and supplements) and documented in the medical record.    HPI  Since last seen by me in December 2024 has had a total of 5 hospitalizations for pneumonia, hypertensive crisis, Pneumothorax. He has lost 14 pounds since  that visit. He now has a port placed in his right subclavian and it is being used for dialysis. He is complaining of severe pain in his right shoulder. He was given lidocaine patches at discharge and Tylenol, but that has been ineffective in managing the pain. He reports limited ROM and strength.      Review of Systems  All applicable systems reviewed and negative accept as noted in the HPI.   Objective   Vitals:  /66   Pulse 71   Temp 35.9 °C (96.7 °F) (Temporal)   Resp 16   Ht 1.702 m (5' 7\")   Wt 66.6 kg (146 lb 12.8 oz)   SpO2 100%   BMI 22.99 kg/m²       Physical Exam  MSK exam: full ROM and strength on the left. Unable to abduct the right shoulder. Palpated site of his port no tenderness of erythema noted. Very tender to palpation posterior over the infraspinatus muscle.   Assessment & Plan  Shortness of breath  Orders:    isosorbide mononitrate ER (Imdur) 30 mg 24 hr tablet; Take 1 tablet (30 mg) by mouth once daily. Do not crush or chew.  Acute pain of right shoulder    Orders:    oxyCODONE (Roxicodone) 5 mg immediate release tablet; Take 1 tablet (5 mg) by mouth every 4 hours if needed for moderate pain (4 - 6) for up to 7 days.    XR shoulder right 2+ views; Future    acetaminophen (Tylenol Extra Strength) 500 mg tablet; Take 2 tablets (1,000 mg) by mouth every 6 hours if needed for mild pain (1 - 3) for up to 10 days.    Routine general medical examination at health care facility    Orders:    1 Year Follow Up In Primary Care - Wellness Exam; " Future      Advance Directives Discussion  16 - 20 minutes were spent discussing Advanced Care Planning (including a Living Will, Medical Power Of , as well as specific end of life choices and/or directives). The details of that discussion were documented in Advanced Directives Discussion section of the medical record.  Materials were provided to the patient for further detailed information.

## 2025-04-30 NOTE — PATIENT INSTRUCTIONS
Good to see you today.  You can take the oxycodone up to every 4 hours as needed to moderate to severe pain. In between the oxycodone you can take tylenol 500 mg.   I prescribed Narcan in case of overdose.   Come back tomorrow for the shoulder xray.

## 2025-05-01 ENCOUNTER — HOSPITAL ENCOUNTER (OUTPATIENT)
Dept: RADIOLOGY | Facility: CLINIC | Age: 70
Discharge: HOME | End: 2025-05-01
Payer: COMMERCIAL

## 2025-05-01 DIAGNOSIS — M25.511 ACUTE PAIN OF RIGHT SHOULDER: ICD-10-CM

## 2025-05-01 PROCEDURE — 73030 X-RAY EXAM OF SHOULDER: CPT | Mod: RT

## 2025-05-02 ENCOUNTER — TELEMEDICINE (OUTPATIENT)
Dept: HEMATOLOGY/ONCOLOGY | Facility: HOSPITAL | Age: 70
End: 2025-05-02
Payer: COMMERCIAL

## 2025-05-02 DIAGNOSIS — N18.6 ESRD (END STAGE RENAL DISEASE) (MULTI): ICD-10-CM

## 2025-05-02 DIAGNOSIS — D63.8 ANEMIA OF CHRONIC DISEASE: ICD-10-CM

## 2025-05-02 DIAGNOSIS — D61.818 PANCYTOPENIA: ICD-10-CM

## 2025-05-02 DIAGNOSIS — D84.9 IMMUNOSUPPRESSION: ICD-10-CM

## 2025-05-02 DIAGNOSIS — Z94.0 KIDNEY REPLACED BY TRANSPLANT (HHS-HCC): ICD-10-CM

## 2025-05-02 DIAGNOSIS — D47.2 MGUS (MONOCLONAL GAMMOPATHY OF UNKNOWN SIGNIFICANCE): Primary | ICD-10-CM

## 2025-05-02 DIAGNOSIS — R91.8 GROUND GLASS OPACITY PRESENT ON IMAGING OF LUNG: ICD-10-CM

## 2025-05-02 ASSESSMENT — ENCOUNTER SYMPTOMS
EYES NEGATIVE: 1
PSYCHIATRIC NEGATIVE: 1
FATIGUE: 1
HEMATOLOGIC/LYMPHATIC NEGATIVE: 1
ENDOCRINE NEGATIVE: 1
ARTHRALGIAS: 1
GASTROINTESTINAL NEGATIVE: 1
RESPIRATORY NEGATIVE: 1
CARDIOVASCULAR NEGATIVE: 1
NEUROLOGICAL NEGATIVE: 1

## 2025-05-02 NOTE — PROGRESS NOTES
Patient ID: Manolo Bashir is a 69 y.o. male.  Referring Physician: No referring provider defined for this encounter.  Primary Care Provider: ZAIRE Armas, JER  Date of Service:  5/2/2025    Virtual or Telephone Consent    Virtual or Telephone Consent    While technically available, the patient was unable to connect via audio/video telehealth technology; therefore, I performed this visit using a real-time audio only connection between Manolo Bashir & ZAIRE Scott.  Verbal consent was requested and obtained from Manolo Bashir on this date, 05/02/25 for a telehealth visit and the patient's location was confirmed at the time of the visit.    MGUS    Mr. Bashir is a 69 year old gentleman with complex medical history who is referred after recent hospitalization for abdominal pain for further evaluation of evolving pancytopenia.  History of anemia for several years treated with iron supplements, does not recall being told about abnormal blood counts until last few months.  He has not required transfusion support and denies any recent infections.  Upon reviewing his blood counts, he had worsening anemia (10-11 down to 7.4 g/dL) and platelets (200s down to 69K) over last 2-3 months.  He has had chronic lymphopenia but has not been neutropenic.  He complains predominantly of GI symptoms including abdominal pain, nausea, and vomiting for which he has been hospitalized 2x recently without clear etiology.     Workup:    SPEP (10/18/23): 0.2g/dL IgA Lambda M protein, 0.1g/dL IgG Kappa M protein  SFLC (10/19/23): 8.23mg/dL Kappa FLC, 6.40mg/dL Lambda FLC, FLC ratio 1.29  Immunoglobulins (10/20/23): IgA 523  Spot UPEP (11/16/23): Marked proteinuria, 2% IgA Lambda M protein, 1% IgG Lambda M protein    BMBx (10/26/23):    -- HYPERCELLULAR BONE MARROW (50%) WITH MATURING TRILINEAGE HEMATOPOIESIS AND INVOLVED (5%) BY PLASMA CELL NEOPLASM    FISH NEGATIVE for gain of 1q, hyperdiploidy of 3,7 and 11,  rearrangement of IGH, and deletion of TP53     Osseous survey (11/19/23):  IMPRESSION:  1. No evidence of suspicious osteolytic lesion in the axial or  appendicular skeleton.  2. Large amount of lobulated soft tissue prominence in the radial  aspect of the left distal upper arm containing interrupted  calcifications. This may represent fistula for hemodialysis and  clinical correlation as well as physical examination suggested.  3. Prominent vascular calcifications of the extremities    Medical History:  ESRD s/p renal transplant (1992, 2013 (left kidney))  HTN  DM 2  HCV      SUBJECTIVE:  History of Present Illness:  Mr. Bashir presents to clinic 5/2/25 for a follow up phone call regarding his MGUS.    Overall he reports not feeling great.     Has been having some shoulder pain since the placement of his dialysis line, had x-ray yesterday.           Review of Systems   Constitutional:  Positive for fatigue.   HENT: Negative.     Eyes: Negative.    Respiratory: Negative.     Cardiovascular: Negative.    Gastrointestinal: Negative.    Endocrine: Negative.    Genitourinary: Negative.    Musculoskeletal:  Positive for arthralgias.   Skin: Negative.    Allergic/Immunologic: Positive for immunocompromised state.   Neurological: Negative.    Hematological: Negative.    Psychiatric/Behavioral: Negative.       OBJECTIVE:  KPS: Karnofsky Score: 80 - Normal activity with effort; some signs or symptoms of disease   VS:  There were no vitals taken for this visit.  BSA: There is no height or weight on file to calculate BSA.    Laboratory:  The pertinent laboratory results were reviewed and discussed with the patient.    Lab Results   Component Value Date    WBC 4.7 04/25/2025    HCT 34.3 (L) 04/25/2025    HGB 10.8 (L) 04/25/2025     (L) 04/25/2025    ANC 3.44 05/18/2024    K 4.6 04/25/2025    CALCIUM 9.7 04/25/2025     (L) 04/25/2025    MG 1.98 04/16/2025    ALT 12 04/25/2025    AST 16 04/25/2025    BUN 30 (H)  04/25/2025    CREATININE 8.00 (H) 04/25/2025    PHOS 4.6 04/25/2025    KAPPA 31.94 (H) 04/25/2025    LAMBDA 37.47 (H) 04/25/2025    KAPLS 0.85 04/25/2025    SPEP Aberrant band detected. See immunofixation. 04/25/2025    IEPIN  04/25/2025 4/25/25  Known monoclonal IgA lambda in the beta region at 0.3 g/dL, monoclonal IgG lambda in the gamma region at 0.2 g/dL, and monoclonal IgG kappa in the gamma region at 0.2 g/dL.     Unchanged from the previous analysis on 3/2/25.         IGG 1,580 04/25/2025    IGM 25 (L) 04/25/2025     (H) 04/25/2025      Note: for a comprehensive list of the patient's lab results, access the Results Review activity.    ASSESSMENT and PLAN:    MGUS:  - 3 M spikes found on workup for worsening pancytopenias: 0.2g/dL IgA Lambda, 0.1g/dL IgG Lambda, 0.1g/dL IgG Kappa  - Both LC elevated w/ normal FLC ratio  - IgA 523  - BMBx showed 5% plasma cells, consistent w/ MGUS  - Osseous survey negative  - Increase in LC and M proteins and IgA, wondering if a component of renal failure, HF    Pancytopenia:  - Longstanding history of anemia (of chronic disease?), taking oral Iron  - Recent decrease in both Hgb and Plts  - Hgb dropped again, likely r/t kidney failure   - Cause of cytopenias not noted on BMBx  - Given IV iron in hospital, currently oral is on hold    Cardiology:  - Hypertension, HFpEF continued management     Renal:  - ESRD s/p DDKT x2, non-functioning now on iHD     RTC:  6 months virtual w/ labs prior     I spent 11 minutes reviewing the POC with the pt.  I spent 32 mintues reviewing the chart, entering orders, documenting, and formulating follow up plan.    ANÍBAL Scott-CNP

## 2025-05-03 DIAGNOSIS — M25.511 ACUTE PAIN OF RIGHT SHOULDER: Primary | ICD-10-CM

## 2025-05-05 ENCOUNTER — TELEPHONE (OUTPATIENT)
Facility: HOSPITAL | Age: 70
End: 2025-05-05
Payer: MEDICARE

## 2025-05-05 PROCEDURE — RXMED WILLOW AMBULATORY MEDICATION CHARGE

## 2025-05-05 NOTE — TELEPHONE ENCOUNTER
Patient called requesting to speak with coordinator about issue with a port that was just placed. A port and he is having pain.  Patient call back number is [phone].

## 2025-05-06 NOTE — TELEPHONE ENCOUNTER
Called patient back, rec VM, LVM message advising to reach out to the department that placed the catheter and if he is concerned for infection he should go to his local ER.

## 2025-05-09 DIAGNOSIS — R91.8 GROUND GLASS OPACITY PRESENT ON IMAGING OF LUNG: ICD-10-CM

## 2025-05-10 ENCOUNTER — PHARMACY VISIT (OUTPATIENT)
Dept: PHARMACY | Facility: CLINIC | Age: 70
End: 2025-05-10
Payer: COMMERCIAL

## 2025-05-11 ENCOUNTER — PHARMACY VISIT (OUTPATIENT)
Dept: PHARMACY | Facility: CLINIC | Age: 70
End: 2025-05-11
Payer: COMMERCIAL

## 2025-05-11 PROCEDURE — RXMED WILLOW AMBULATORY MEDICATION CHARGE

## 2025-05-14 ENCOUNTER — OFFICE VISIT (OUTPATIENT)
Dept: SURGERY | Facility: CLINIC | Age: 70
End: 2025-05-14
Payer: COMMERCIAL

## 2025-05-14 ENCOUNTER — APPOINTMENT (OUTPATIENT)
Dept: ENDOCRINOLOGY | Facility: CLINIC | Age: 70
End: 2025-05-14
Payer: COMMERCIAL

## 2025-05-14 VITALS
HEIGHT: 68 IN | WEIGHT: 151 LBS | HEART RATE: 69 BPM | DIASTOLIC BLOOD PRESSURE: 63 MMHG | BODY MASS INDEX: 22.88 KG/M2 | SYSTOLIC BLOOD PRESSURE: 131 MMHG

## 2025-05-14 VITALS
BODY MASS INDEX: 23.46 KG/M2 | DIASTOLIC BLOOD PRESSURE: 63 MMHG | HEIGHT: 68 IN | RESPIRATION RATE: 16 BRPM | HEART RATE: 69 BPM | SYSTOLIC BLOOD PRESSURE: 131 MMHG | WEIGHT: 154.8 LBS

## 2025-05-14 DIAGNOSIS — N18.6 ESRD (END STAGE RENAL DISEASE) (MULTI): ICD-10-CM

## 2025-05-14 DIAGNOSIS — N18.4 TYPE 2 DIABETES MELLITUS WITH STAGE 4 CHRONIC KIDNEY DISEASE, WITH LONG-TERM CURRENT USE OF INSULIN (MULTI): ICD-10-CM

## 2025-05-14 DIAGNOSIS — E11.9 TYPE 2 DIABETES MELLITUS WITHOUT COMPLICATION, WITH LONG-TERM CURRENT USE OF INSULIN: ICD-10-CM

## 2025-05-14 DIAGNOSIS — Z79.4 TYPE 2 DIABETES MELLITUS WITH STAGE 4 CHRONIC KIDNEY DISEASE, WITH LONG-TERM CURRENT USE OF INSULIN (MULTI): ICD-10-CM

## 2025-05-14 DIAGNOSIS — I77.0 AVF (ARTERIOVENOUS FISTULA): Primary | ICD-10-CM

## 2025-05-14 DIAGNOSIS — E11.22 TYPE 2 DIABETES MELLITUS WITH STAGE 4 CHRONIC KIDNEY DISEASE, WITH LONG-TERM CURRENT USE OF INSULIN (MULTI): ICD-10-CM

## 2025-05-14 DIAGNOSIS — Z79.4 TYPE 2 DIABETES MELLITUS WITHOUT COMPLICATION, WITH LONG-TERM CURRENT USE OF INSULIN: ICD-10-CM

## 2025-05-14 DIAGNOSIS — E10.9 TYPE 1 DIABETES MELLITUS WITHOUT COMPLICATION: ICD-10-CM

## 2025-05-14 LAB — POC HEMOGLOBIN A1C: 8.5 % (ref 4.2–6.5)

## 2025-05-14 PROCEDURE — 3008F BODY MASS INDEX DOCD: CPT | Performed by: TRANSPLANT SURGERY

## 2025-05-14 PROCEDURE — 3078F DIAST BP <80 MM HG: CPT | Performed by: TRANSPLANT SURGERY

## 2025-05-14 PROCEDURE — 83036 HEMOGLOBIN GLYCOSYLATED A1C: CPT | Performed by: STUDENT IN AN ORGANIZED HEALTH CARE EDUCATION/TRAINING PROGRAM

## 2025-05-14 PROCEDURE — 1159F MED LIST DOCD IN RCRD: CPT | Performed by: STUDENT IN AN ORGANIZED HEALTH CARE EDUCATION/TRAINING PROGRAM

## 2025-05-14 PROCEDURE — G2211 COMPLEX E/M VISIT ADD ON: HCPCS | Performed by: STUDENT IN AN ORGANIZED HEALTH CARE EDUCATION/TRAINING PROGRAM

## 2025-05-14 PROCEDURE — 1159F MED LIST DOCD IN RCRD: CPT | Performed by: TRANSPLANT SURGERY

## 2025-05-14 PROCEDURE — 1111F DSCHRG MED/CURRENT MED MERGE: CPT | Performed by: STUDENT IN AN ORGANIZED HEALTH CARE EDUCATION/TRAINING PROGRAM

## 2025-05-14 PROCEDURE — 3052F HG A1C>EQUAL 8.0%<EQUAL 9.0%: CPT | Performed by: TRANSPLANT SURGERY

## 2025-05-14 PROCEDURE — 3075F SYST BP GE 130 - 139MM HG: CPT | Performed by: TRANSPLANT SURGERY

## 2025-05-14 PROCEDURE — 1125F AMNT PAIN NOTED PAIN PRSNT: CPT | Performed by: TRANSPLANT SURGERY

## 2025-05-14 PROCEDURE — RXMED WILLOW AMBULATORY MEDICATION CHARGE

## 2025-05-14 PROCEDURE — 1111F DSCHRG MED/CURRENT MED MERGE: CPT | Performed by: TRANSPLANT SURGERY

## 2025-05-14 PROCEDURE — 3008F BODY MASS INDEX DOCD: CPT | Performed by: STUDENT IN AN ORGANIZED HEALTH CARE EDUCATION/TRAINING PROGRAM

## 2025-05-14 PROCEDURE — 3078F DIAST BP <80 MM HG: CPT | Performed by: STUDENT IN AN ORGANIZED HEALTH CARE EDUCATION/TRAINING PROGRAM

## 2025-05-14 PROCEDURE — 99214 OFFICE O/P EST MOD 30 MIN: CPT | Performed by: STUDENT IN AN ORGANIZED HEALTH CARE EDUCATION/TRAINING PROGRAM

## 2025-05-14 PROCEDURE — 99213 OFFICE O/P EST LOW 20 MIN: CPT | Performed by: TRANSPLANT SURGERY

## 2025-05-14 PROCEDURE — 3052F HG A1C>EQUAL 8.0%<EQUAL 9.0%: CPT | Performed by: STUDENT IN AN ORGANIZED HEALTH CARE EDUCATION/TRAINING PROGRAM

## 2025-05-14 PROCEDURE — 3075F SYST BP GE 130 - 139MM HG: CPT | Performed by: STUDENT IN AN ORGANIZED HEALTH CARE EDUCATION/TRAINING PROGRAM

## 2025-05-14 RX ORDER — PEN NEEDLE, DIABETIC 30 GX3/16"
NEEDLE, DISPOSABLE MISCELLANEOUS
Qty: 400 EACH | Refills: 3 | Status: SHIPPED | OUTPATIENT
Start: 2025-05-14

## 2025-05-14 RX ORDER — INSULIN LISPRO 100 [IU]/ML
INJECTION, SOLUTION INTRAVENOUS; SUBCUTANEOUS
Qty: 15 ML | Refills: 6 | Status: SHIPPED | OUTPATIENT
Start: 2025-05-14

## 2025-05-14 RX ORDER — LANCETS 33 GAUGE
1 EACH MISCELLANEOUS 3 TIMES DAILY
Qty: 100 EACH | Refills: 3 | Status: SHIPPED | OUTPATIENT
Start: 2025-05-14

## 2025-05-14 RX ORDER — CALCIUM CITRATE/VITAMIN D3 200MG-6.25
TABLET ORAL
Qty: 400 STRIP | Refills: 3 | Status: SHIPPED | OUTPATIENT
Start: 2025-05-14

## 2025-05-14 RX ORDER — INSULIN GLARGINE 100 [IU]/ML
INJECTION, SOLUTION SUBCUTANEOUS
Qty: 15 ML | Refills: 0 | Status: SHIPPED | OUTPATIENT
Start: 2025-05-14

## 2025-05-14 ASSESSMENT — PAIN SCALES - GENERAL: PAINLEVEL_OUTOF10: 10-WORST PAIN EVER

## 2025-05-14 NOTE — PATIENT INSTRUCTIONS
Check your blood sugar at least 3x/day   Follow the insulin instructions on your sheet    Get your lab done with dialysis    Follow up next available     Estella Quinonez MD  Divison of Endocrinology   [facility]   Phone: [phone]    option 4, then option 1  Fax: [phone]

## 2025-05-14 NOTE — PROGRESS NOTES
Manolo Bashir is a 69 y.o. male with pmh of h/o ESRD (previously on HD [id number]), s/p x2 renal transplant (1992, 2013), now impaired graft function, immune complex GN/proliferative glomerulonephritis , DM2, HTN, prostate CA s/p radical prostatectomy , cholecystectomy, h/o IgG and IgA lambda MGUS,  HCV s/p rx (PCR neg 10/2023)  , h/o thyroid nodules.     FF up for DM2      Interval: currently on dialysis [facility] thurs sat   Recently hospitalized in 4/2025 requiring urgent dialysis has been admitted multiple times this year for hypertensive crisis, pneumonia  Bgs in the hospital showing both hyper and hypoglycemia, but overall insulin requirements much less       Last A1c   Lab Results   Component Value Date    HGBA1C 8.5 (A) 05/14/2025      Home regimen:   glargine to 20 units   Continue sliding scale with meals as needed 1:50 >200    DM diagnosed 13 years  ago, post transplant kidney 2/2 HTN   On chronic prednisone 5mg   Complications Micro and Macro-with residual CKD now on dialysis     SMBG     Hypoglycemia frequency:   Hypoglycemia awareness: yes      Diabetes Complications:   Retinopathy: opthal sees in outside facility a few months ago- states he has glaucoma and they recommended surgery   Foot-sees podiatry  Diabetic neuropathy : none  Nephropathy: on dialysis off ARB  On statin-pravastatin 10       Diet  Appetite is poor   Does always not eat breakfast   Lunch-usually sandwich   Dinner- meat, side dish     Exercise: none,ankle pain preempts this     Thyroid nodule:   Thyroid ultrasound in 2022 -homogenous with no suspicious nodules, there are multiple cervical lymph nodes characterized as reactive and benign appearing  He has ultrasounds as far back as 2019         12 point ROS negative except as stated above    Past Medical History:   Diagnosis Date    Anemia     Arthritis     Cataract     Chronic kidney disease, stage 3 unspecified (Multi) 09/26/2018    Stage 3 chronic kidney disease    CKD (chronic kidney  disease)     stage V    Cough 02/12/2024    COVID-19 06/18/2020    COVID-19 virus infection    Diabetes (Multi)     ESRD (end stage renal disease) (Multi)     Focal and segmental proliferative glomerulonephritis 12/23/2023    HTN (hypertension)     Hyperlipidemia     Other long term (current) drug therapy 07/20/2021    High risk medication use    Personal history of other diseases of the circulatory system     Personal history of cardiac murmur    Personal history of other infectious and parasitic diseases 08/17/2015    History of hepatitis    Polyp, colonic 08/17/2023    Primary osteoarthritis of both ankles 08/17/2023    Prostate cancer (Multi)     Tubular adenoma of colon 08/17/2023    Unspecified kidney failure 08/17/2016    Renal failure         Family History  Family History   Problem Relation Name Age of Onset    Bone cancer Mother      Other (corona's sarcome of the bone marrow) Mother      Prostate cancer Father      Diabetes Other Family Hist     Hypertension Other Family Hist      Social History     Socioeconomic History    Marital status: Single     Spouse name: Not on file    Number of children: Not on file    Years of education: Not on file    Highest education level: Not on file   Occupational History    Not on file   Tobacco Use    Smoking status: Never     Passive exposure: Past    Smokeless tobacco: Never   Vaping Use    Vaping status: Never Used   Substance and Sexual Activity    Alcohol use: Never    Drug use: Not on file    Sexual activity: Defer   Other Topics Concern    Not on file   Social History Narrative    Not on file     Social Drivers of Health     Financial Resource Strain: Low Risk  (4/15/2025)    Overall Financial Resource Strain (CARDIA)     Difficulty of Paying Living Expenses: Not hard at all   Food Insecurity: No Food Insecurity (4/30/2025)    Hunger Vital Sign     Worried About Running Out of Food in the Last Year: Never true     Ran Out of Food in the Last Year: Never true    Transportation Needs: No Transportation Needs (4/15/2025)    PRAPARE - Transportation     Lack of Transportation (Medical): No     Lack of Transportation (Non-Medical): No   Physical Activity: Sufficiently Active (1/5/2025)    Exercise Vital Sign     Days of Exercise per Week: 7 days     Minutes of Exercise per Session: 60 min   Stress: No Stress Concern Present (3/1/2025)    [employer] - Occupational Stress Questionnaire     Feeling of Stress : Not at all   Social Connections: Socially Isolated (1/5/2025)    Social Connection and Isolation Panel [NHANES]     Frequency of Communication with Friends and Family: More than three times a week     Frequency of Social Gatherings with Friends and Family: More than three times a week     Attends Yazidism Services: Never     Active Member of Clubs or Organizations: No     Attends Club or Organization Meetings: Never     Marital Status: Never    Intimate Partner Violence: Not At Risk (4/15/2025)    Humiliation, Afraid, Rape, and Kick questionnaire     Fear of Current or Ex-Partner: No     Emotionally Abused: No     Physically Abused: No     Sexually Abused: No   Housing Stability: Low Risk  (4/15/2025)    Housing Stability Vital Sign     Unable to Pay for Housing in the Last Year: No     Number of Times Moved in the Last Year: 0     Homeless in the Last Year: No   Recent Concern: Housing Stability - High Risk (3/1/2025)    Housing Stability Vital Sign     Unable to Pay for Housing in the Last Year: Yes     Number of Times Moved in the Last Year: 0     Homeless in the Last Year: No     Physical Exam  Constitutional:       Appearance: Normal appearance.   Cardiovascular:      Comments: Systolic murmur  Musculoskeletal:         General: No swelling.   Skin:     General: Skin is warm.      Comments: No lipodystrophy   Neurological:      Mental Status: He is alert and oriented to person, place, and time.           Lab Review  Lab Results  "  Component Value Date    HGBA1C 8.5 (A) 05/14/2025    HGBA1C 9.1 (H) 12/16/2024    HGBA1C 6.6 (H) 07/10/2024     (L) 04/25/2025    K 4.6 04/25/2025    CL 98 04/25/2025    CO2 26 04/25/2025    BUN 30 (H) 04/25/2025    CREATININE 8.00 (H) 04/25/2025    CALCIUM 9.7 04/25/2025    ALBUMIN 3.6 04/25/2025    PROT 7.5 04/25/2025    PROT 7.3 04/25/2025    BILITOT 0.5 04/25/2025    ALKPHOS 87 04/25/2025    ALT 12 04/25/2025    AST 16 04/25/2025    GLUCOSE 298 (H) 04/25/2025    CHOL 80 12/16/2024    TRIG 47 12/16/2024    HDL 35.8 12/16/2024    BHYDRXBUT 0.10 02/27/2025    CPEPTIDE 4.4 (H) 07/10/2024      Glucose (mg/dL)   Date Value   04/25/2025 298 (H)   04/16/2025 172 (H)   04/15/2025 43 (LL)     POC HEMOGLOBIN A1c (%)   Date Value   05/14/2025 8.5 (A)     Hemoglobin A1C (%)   Date Value   12/16/2024 9.1 (H)   07/10/2024 6.6 (H)   01/20/2024 7.7 (H)     Bicarbonate (mmol/L)   Date Value   04/25/2025 26   04/16/2025 29   04/15/2025 25     Urea Nitrogen (mg/dL)   Date Value   04/25/2025 30 (H)   04/16/2025 27 (H)   04/15/2025 44 (H)     Creatinine (mg/dL)   Date Value   04/25/2025 8.00 (H)   04/16/2025 7.65 (H)   04/15/2025 10.73 (H)     Lab Results   Component Value Date    CHOL 80 12/16/2024    CHOL 96 07/10/2024    CHOL 99 10/26/2023     Lab Results   Component Value Date    HDL 35.8 12/16/2024    HDL 44.3 07/10/2024    HDL 48.6 10/26/2023     Lab Results   Component Value Date    LDLCALC 35 12/16/2024    LDLCALC 41 10/26/2023     Lab Results   Component Value Date    TRIG 47 12/16/2024    TRIG 46 10/26/2023    TRIG 110 03/17/2022     No components found for: \"CHOLHDL\"   Lab Results   Component Value Date    TSH 2.84 03/18/2025     No results found for: \"ALBUR\", \"[id number]\"      Problem List Items Addressed This Visit       Type 2 diabetes mellitus with stage 4 chronic kidney disease, with long-term current use of insulin (Multi)    Relevant Medications    pen needle, diabetic (TechLITE Pen Needle) 32 gauge x 5/32\" " needle    insulin glargine (Lantus) 100 unit/mL (3 mL) pen    insulin lispro (HumaLOG) 100 unit/mL pen    Unilet Super Thin Lancets 30 gauge misc    blood sugar diagnostic (True Metrix Glucose Test Strip)    Other Relevant Orders    Fructosamine     Other Visit Diagnoses         Type 2 diabetes mellitus without complication, with long-term current use of insulin        Relevant Orders    POCT glycosylated hemoglobin (Hb A1C) manually resulted (Completed)      Type 1 diabetes mellitus without complication                  DM2 with hyper and hypoglycemia    Fructosamine in 12/2024 correlating to A1c of 13, but A1c 9.1     Currently doing dialysis    Does not want a CGM.    Continue glargine 20 units  Start prandial insulin 2-4-4 with ISS 1:50 >200  Will add fructosamine with labs    Thyroid:  No need for repeat ultrasound unless a change in clinical status     Follow up next available     Hospital records reviewed

## 2025-05-16 ENCOUNTER — OFFICE VISIT (OUTPATIENT)
Dept: ORTHOPEDIC SURGERY | Facility: CLINIC | Age: 70
End: 2025-05-16
Payer: COMMERCIAL

## 2025-05-16 DIAGNOSIS — M12.811 ROTATOR CUFF ARTHROPATHY OF RIGHT SHOULDER: Primary | ICD-10-CM

## 2025-05-16 DIAGNOSIS — M79.89 LIMB SWELLING: ICD-10-CM

## 2025-05-16 DIAGNOSIS — R91.8 GROUND GLASS OPACITY PRESENT ON IMAGING OF LUNG: ICD-10-CM

## 2025-05-16 DIAGNOSIS — M25.511 ACUTE PAIN OF RIGHT SHOULDER: ICD-10-CM

## 2025-05-16 NOTE — PROGRESS NOTES
Patient presents with a swollen right shoulder that occurred after he had his access catheter placed for dialysis no fevers or chills no history of trauma just quite painful    See intake sheet which was reviewed and scanned in the chart    Examination: He is awake alert oriented appropriate he has an obvious fistula in the left arm he has a catheter in right chest region has a very swollen shoulder with limited mobility there is no real increased warmth compared to the contralateral side      X-rays show some superior migration humeral head  Assessment large fluid collection shoulder may be related to a bleed and synovitis versus infection shoulder was aspirated today obtained about 10 cc of bloody fluid was sent for analysis and get back to him  L Inj/Asp: R glenohumeral on 5/16/2025 11:05 AM  Indications: joint swelling and pain  Details: 18 G needle, lateral approach  Aspirate: 20 mL blood-tinged

## 2025-05-19 ENCOUNTER — APPOINTMENT (OUTPATIENT)
Dept: INFECTIOUS DISEASES | Facility: CLINIC | Age: 70
End: 2025-05-19
Payer: MEDICARE

## 2025-05-19 DIAGNOSIS — J18.9 ATYPICAL PNEUMONIA: Primary | ICD-10-CM

## 2025-05-19 PROCEDURE — 99214 OFFICE O/P EST MOD 30 MIN: CPT | Performed by: INTERNAL MEDICINE

## 2025-05-19 PROCEDURE — G2211 COMPLEX E/M VISIT ADD ON: HCPCS | Performed by: INTERNAL MEDICINE

## 2025-05-19 PROCEDURE — 3052F HG A1C>EQUAL 8.0%<EQUAL 9.0%: CPT | Performed by: INTERNAL MEDICINE

## 2025-05-19 ASSESSMENT — ENCOUNTER SYMPTOMS
CARDIOVASCULAR NEGATIVE: 1
MUSCULOSKELETAL NEGATIVE: 1
ALLERGIC/IMMUNOLOGIC NEGATIVE: 1
PSYCHIATRIC NEGATIVE: 1
EYES NEGATIVE: 1
RESPIRATORY NEGATIVE: 1
NEUROLOGICAL NEGATIVE: 1
CONSTITUTIONAL NEGATIVE: 1
HEMATOLOGIC/LYMPHATIC NEGATIVE: 1
GASTROINTESTINAL NEGATIVE: 1

## 2025-05-19 NOTE — PROGRESS NOTES
Infectious Diseases Clinic Follow-up:    Reason for Visit: No chief complaint on file.      History of Current Issue  Manolo Bashir is a 69 y.o. year old male     Telephone visit for hospital follow up.    EXCERPTS FROM LAST ID NOTE (3/4/2025)    70 y/o male with PmHx of ESRD s/p renal transplants x2, non-functioning: initially in 1992 c/b allograft failure 2006, 2nd transplant 2013, c/b impaired allograft function, currently on IHD since May 2024; on tacrolimus and prednisone 5mg, MGUS, DMII, hypertension, prostate cancer s/p radical prostatectomy, HCV (treated w/ Aries, HCV PCR negative 2019).  Recent history of C. diff (treated w/ vancomycin PO 10 day course EOT 2/6/25) as well as development of graft intolerance over past 2 months, currently being evaluated for nephrectomy due to persistent graft related left lower quadrant pain. Recently received course of empiric doxycycline 100 mg BID x 10 days + levofloxacin x 5 days for suspected PNA.     Now admitted on 2/27/25 presenting with sudden onset sharp chest pain, associated with productive cough, sinus pressure and pain, all of which have resolved since admission. Continues to have LLQ pain at allograft site, also complained of rash in genital area. Denies N/V/D, does not make urine (occasional dribbling)     Afebrile, BlCx 2/27/25 x2 NGTD, COVID/FLU negative 2/28,   Pancytopenia w/ WBC 2.6, ANC 1.02.  Strep, legionella Ag not completed due to anuria.  CRP 1.88, Procal 0.28.     T-spot and Syphilis Ab negative in 7/2024. HIV screen negative 8/2024.        Impression:  #Atypical pneumonia  #Chronic Multifocal pulmonary GGO - present on multiple imaging studies over minimum past 6 mo  #Suspected viral lower respiratory tract infection w/ sinusitis  #Renal allograft intolerance      In the setting of chronic ground glass opacities on lung imaging, with recurrent episodes of cough which improves with short courses of antibiotics, suspect atypical pneumonia.    Given patient's transplant and immunosuppressed status, will pursue broad workup to rule out less common etiologies (such as TB, Nocardia, Histoplasma, Coccidio, Mycoplasma pneumoniae, Legionella...) in addition to common.   -pending respiratory viral panel,   -pending fungitell and serum Histo Ag, can also add aspergillus galactomannan, Blastomyces Ab  -pending tspot  -cannot rule out allograft infection due to anuria           Recommendations:  -please start isavuconazole PO w/ loading dose 372mg q8 x 48 hours, followed by 372mg daily, tentative course 3 months.   -please start augmentin 500mg q24 PO for 6 week course  -Monitor labs with CBC/diff and CMP weekly, please fax to [phone] Attn. Dr. Preston.  -ID will arrange f/u around 4/15/25  -if able to produce sputum sample, please send for bacterial, fungal, and AFB cultures,   -appreciate pulmonology evaluation/consideration for bronchoscopy and BAL - if bronch indicated in future, please send for bacterial, fungal, and AFB cultures, MTB/Rif PCR, and Pneumocystis PCR  -continue topical clotrimazole or terbinafine for scrotal lesions     Patient discussed with Dr. Preston. ID will sign off at this time, please reach out with any questions or concerns.     Leora Davis MD  ID Fellow, PGY V.   For questions on [facility] patients please reach out via [facility] chat with any questions or concerns.      PAST MEDICAL HISTORY:  Medical History[1]    PAST SURGICAL HISTORY:  Surgical History[2]    ALLERGIES:    Allergies[3]    MEDICATIONS:    Current Medications[4]    REVIEW OF SYSTEMS:    Review of Systems   Constitutional: Negative.    HENT: Negative.     Eyes: Negative.    Respiratory: Negative.     Cardiovascular: Negative.    Gastrointestinal: Negative.    Genitourinary: Negative.    Musculoskeletal: Negative.    Skin: Negative.    Allergic/Immunologic: Negative.    Neurological: Negative.    Hematological: Negative.    Psychiatric/Behavioral: Negative.     All  other systems reviewed and are negative.      PHYSICAL EXAMINATION:       Visit Vitals  Smoking Status Never        EXAM:   N/A      DATA:   N/A    ASSESSMENT / RECOMMENDATIONS:  68 y/o male with PmHx of ESRD s/p renal transplants x2, non-functioning: initially in 1992 c/b allograft failure 2006, 2nd transplant 2013, c/b impaired allograft function, currently on IHD since May 2024; on tacrolimus and prednisone 5mg, MGUS, DMII, hypertension, prostate cancer s/p radical prostatectomy, HCV (treated w/ Aries, HCV PCR negative 2019).  Recent history of C. diff (treated w/ vancomycin PO 10 day course EOT 2/6/25) as well as development of graft intolerance over past 2 months, currently being evaluated for nephrectomy due to persistent graft related left lower quadrant pain. Recently received course of empiric doxycycline 100 mg BID x 10 days + levofloxacin x 5 days for suspected PNA.     Now admitted on 2/27/25 presenting with sudden onset sharp chest pain, associated with productive cough, sinus pressure and pain, all of which have resolved since admission. Continues to have LLQ pain at allograft site, also complained of rash in genital area. Denies N/V/D, does not make urine (occasional dribbling)     Afebrile, BlCx 2/27/25 x2 NGTD, COVID/FLU negative 2/28,   Pancytopenia w/ WBC 2.6, ANC 1.02.  Strep, legionella Ag not completed due to anuria.  CRP 1.88, Procal 0.28.     T-spot and Syphilis Ab negative in 7/2024. HIV screen negative 8/2024.        Impression:  #Atypical pneumonia  #Chronic Multifocal pulmonary GGO - present on multiple imaging studies over minimum past 6 mo  #Suspected viral lower respiratory tract infection w/ sinusitis  #Renal allograft intolerance      In the setting of chronic ground glass opacities on lung imaging, with recurrent episodes of cough which improves with short courses of antibiotics, suspect atypical pneumonia.   Given patient's transplant and immunosuppressed status, will pursue  broad workup to rule out less common etiologies (such as TB, Nocardia, Histoplasma, Coccidio, Mycoplasma pneumoniae, Legionella...) in addition to common.   -pending respiratory viral panel,   -pending fungitell and serum Histo Ag, can also add aspergillus galactomannan, Blastomyces Ab  -pending tspot  -cannot rule out allograft infection due to anuria     UPDATE 5/19/2025  He reports feeling better overall. He took isavuconazole for approximately two weeks but discontinued due to diarrhea, subsequently testing positive for rotavirus on stool PCR. Currently, he notes significant improvement in his breathing, with minimal residual respiratory issues. Review of additional lab results revealed no other infectious etiologies. At this time, we agreed he does not require further ID follow-up.    PLAN:  As above     Pt and wife are in agreement with plan.    I spent 25 minutes in the professional and overall care of this patient.      Narayan Preston MD MPH              [1]   Past Medical History:  Diagnosis Date    Anemia     Arthritis     Cataract     Chronic kidney disease, stage 3 unspecified (Multi) 09/26/2018    Stage 3 chronic kidney disease    CKD (chronic kidney disease)     stage V    Cough 02/12/2024    COVID-19 06/18/2020    COVID-19 virus infection    Diabetes (Multi)     ESRD (end stage renal disease) (Multi)     Focal and segmental proliferative glomerulonephritis 12/23/2023    HTN (hypertension)     Hyperlipidemia     Other long term (current) drug therapy 07/20/2021    High risk medication use    Personal history of other diseases of the circulatory system     Personal history of cardiac murmur    Personal history of other infectious and parasitic diseases 08/17/2015    History of hepatitis    Polyp, colonic 08/17/2023    Primary osteoarthritis of both ankles 08/17/2023    Prostate cancer (Multi)     Tubular adenoma of colon 08/17/2023    Unspecified kidney failure 08/17/2016    Renal failure   [2]    Past Surgical History:  Procedure Laterality Date    ILEOSTOMY  04/25/2017    Ileostomy    ILEOSTOMY CLOSURE  08/17/2015    Ileostomy Closure    OTHER SURGICAL HISTORY  04/21/2017    Right Hemicolectomy    OTHER SURGICAL HISTORY  08/17/2015    Arteriovenous Surgery Creation Of A-V Fistula    OTHER SURGICAL HISTORY  08/17/2015    Sigmoidoscopy (Fiberoptic, Therapeutic )    PROSTATECTOMY  10/11/2013    Prostatectomy Radical    TRANSPLANT, KIDNEY, OPEN  1992    TRANSPLANT, KIDNEY, OPEN  2013    US GUIDED PERCUTANEOUS BIOPSY RENAL LEFT Left 11/20/2023    US GUIDED PERCUTANEOUS BIOPSY RENAL LEFT 11/20/2023 Lou Rodgers MD [facility]    US GUIDED PERCUTANEOUS PERITONEAL OR RETROPERITONEAL FLUID COLLECTION DRAINAGE  10/20/2022    US GUIDED PERCUTANEOUS PERITONEAL OR RETROPERITONEAL FLUID COLLECTION DRAINAGE 10/20/2022 [facility]   [3] No Known Allergies  [4]   Current Outpatient Medications:     blood sugar diagnostic (True Metrix Glucose Test Strip), For BG check 4x/day, Disp: 400 strip, Rfl: 3    carvedilol (Coreg) 12.5 mg tablet, Take 3 tablets (37.5 mg) by mouth 2 times a day. Hold for systolic BP <140 and HR <60. PATIENT NEEDS TO FOLLOW UP WITH CARDIOLOGY, Disp: 90 tablet, Rfl: 3    cinacalcet (Sensipar) 30 mg tablet, Take 1 tablet (30 mg) by mouth once daily., Disp: 30 tablet, Rfl: 1    clotrimazole (Lotrimin) 1 % cream, Apply topically 2 times a day., Disp: 90 g, Rfl: 0    Easy Touch Alcohol Prep Pads pads, medicated, , Disp: , Rfl:     fluocinonide (Lidex) 0.05 % ointment, Apply to affected areas twice daily when active as needed. Use less than 14 days per month. (Patient not taking: Reported on 5/14/2025), Disp: 60 g, Rfl: 3    glucagon (Gvoke HypoPen 1-Pack) 1 mg/0.2 mL auto-injector, Inject 1 mg into the muscle every 15 minutes if needed (For blood glucose 41 to 70 mg/dL and no IV access)., Disp: 0.2 mL, Rfl: 12    imiquimod (Aldara) 5 % cream, Apply 1 packet topically 3 times a week. (Patient not  "taking: Reported on 5/14/2025), Disp: 12 packet, Rfl: 3    insulin glargine (Lantus) 100 unit/mL (3 mL) pen, 20 units daily, Disp: 15 mL, Rfl: 0    insulin lispro (HumaLOG) 100 unit/mL pen, 4 units with lunch and dinner plus a sliding scale up to 20 units daily, Disp: 15 mL, Rfl: 6    isavuconazonium sulfate (Cresemba) 186 mg capsule capsule, Take 2 capsules (372 mg) by mouth once daily. Take 2 capsules 3/6 at 10pm and 2 capsules 3/7 6am, then starting 3/8 take 2 capsules once a day (Patient not taking: Reported on 5/14/2025), Disp: 180 capsule, Rfl: 0    isosorbide mononitrate ER (Imdur) 30 mg 24 hr tablet, Take 1 tablet (30 mg) by mouth once daily. Do not crush or chew., Disp: 30 tablet, Rfl: 0    lancets 33 gauge misc, 1 each 3 times a day., Disp: 100 each, Rfl: 3    lancing device misc, use as directed, Disp: 1 each, Rfl: 0    metoclopramide (Reglan) 5 mg tablet, Take 1 tablet (5 mg) by mouth 3 times a day before meals., Disp: 90 tablet, Rfl: 1    naloxone (Narcan) 4 mg/0.1 mL nasal spray, Administer 1 spray (4 mg) into affected nostril(s) if needed for opioid reversal. May repeat every 2-3 minutes if needed, alternating nostrils, until medical assistance becomes available., Disp: 2 each, Rfl: 0    NIFEdipine ER (Adalat CC) 90 mg 24 hr tablet, Take 1 tablet (90 mg) by mouth 2 times a day. Do not crush, chew, or split., Disp: 180 tablet, Rfl: 3    pantoprazole (ProtoNix) 40 mg EC tablet, Take 1 tablet (40 mg) by mouth once daily in the morning. Take before meals. Do not crush, chew, or split. Do not start before January 22, 2024., Disp: 30 tablet, Rfl: 11    pen needle, diabetic (TechLITE Pen Needle) 32 gauge x 5/32\" needle, Use 4 a day as directed, Disp: 400 each, Rfl: 3    pravastatin (Pravachol) 10 mg tablet, Take 1 tablet (10 mg) by mouth once daily at bedtime., Disp: 90 tablet, Rfl: 1    predniSONE (Deltasone) 5 mg tablet, Take 1 tablet (5 mg) by mouth once daily., Disp: 30 tablet, Rfl: 11    sevelamer " "carbonate (Renvela) 800 mg tablet, Take 1 tablet (800 mg) by mouth 3 times a day before meals. Swallow tablet whole; do not crush, break, or chew., Disp: 90 tablet, Rfl: 0    sodium chloride (Ocean) 0.65 % nasal spray, Administer 1 spray into each nostril 4 times a day as needed for congestion., Disp: 44 mL, Rfl: 12    syringe with needle 3 mL 25 x 5/8\" syringe, Use to inject epogen. (Patient not taking: Reported on 5/14/2025), Disp: 7 each, Rfl: 0    tacrolimus (Prograf) 0.5 mg capsule, Take 1 capsule (0.5 mg) by mouth 2 times a day., Disp: 60 capsule, Rfl: 11    tacrolimus (Protopic) 0.1 % ointment, Apply topically 2 times a day. (Patient not taking: Reported on 2/28/2025), Disp: 60 g, Rfl: 3    vitamin B complex-vitamin C-folic acid (Nephrocaps) 1 mg capsule, Take 1 capsule by mouth once daily., Disp: 30 capsule, Rfl: 11    "

## 2025-05-19 NOTE — DOCUMENTATION CLARIFICATION NOTE
PATIENT:               KELVIN ROB  ACCT #:                  [id number]  MRN:                       [id number]  :                       1955  ADMIT DATE:       2025 6:50 PM  DISCH DATE:        2025 3:17 PM  RESPONDING PROVIDER #:        [id number]          PROVIDER RESPONSE TEXT:    I agree with dietician diagnosis of moderate malnutrition on 4/15/25    CDI QUERY TEXT:    Clarification      Instruction:    Based on your assessment of the patient and the clinical information, please provide the requested documentation by clicking on the appropriate radio button and enter any additional information if prompted.    Question: Please further clarify this patient nutritional status as    When answering this query, please exercise your independent professional judgment. The fact that a question is being asked, does not imply that any particular answer is desired or expected.    The patient's clinical indicators include:  Clinical Information:  69 y.o. male w/PMHx of ESRD, HTN, HFpEF, prostate cancer s/p radical prostatectomy presented with  nausea, vomiting, chest pain and headache.    Started O2 for AHRF d/t Pulm edema attributed to HTN ER.   Acute mental status change, due to HTN encephalopathy.    Clinical Indicators:  4/15 Registered Dietician Consult   BMI 23.1  Diagnosis Status: New  Malnutrition Diagnosis: Moderate malnutrition related to chronic disease or condition  As Evidenced by: mild-modertae fat loss and muscle wasting  Additional Assessment Information: Despite pt reports of good intake, pt may not be meeting increased needs    Treatment:  Liberalize diet to regular to promote intake  Ordered [employer] standard 1.0 (325 kcal, 16 g pro) BID    Risk Factors:  Increased nutrient needs related to increased metabolic demand as evidenced by ESRD on HD  recent atypical pneumonia  Options provided:  -- I agree with dietician diagnosis of moderate malnutrition on 4/15/25  -- Other - I will add my  own diagnosis  -- Refer to Clinical Documentation Reviewer    Query created by: Bee Cote on 5/16/2025 2:04 PM      Electronically signed by:  LEENA COULTER MD 5/19/2025 8:01 AM

## 2025-05-20 ENCOUNTER — APPOINTMENT (OUTPATIENT)
Dept: RADIOLOGY | Facility: HOSPITAL | Age: 70
End: 2025-05-20
Payer: COMMERCIAL

## 2025-05-20 ENCOUNTER — HOSPITAL ENCOUNTER (EMERGENCY)
Facility: HOSPITAL | Age: 70
Discharge: HOME | End: 2025-05-21
Attending: EMERGENCY MEDICINE
Payer: COMMERCIAL

## 2025-05-20 DIAGNOSIS — G89.29 CHRONIC RIGHT SHOULDER PAIN: Primary | ICD-10-CM

## 2025-05-20 DIAGNOSIS — M25.511 CHRONIC RIGHT SHOULDER PAIN: Primary | ICD-10-CM

## 2025-05-20 LAB
ALBUMIN SERPL BCP-MCNC: 3.2 G/DL (ref 3.4–5)
ALP SERPL-CCNC: 108 U/L (ref 33–136)
ALT SERPL W P-5'-P-CCNC: 24 U/L (ref 10–52)
ANION GAP SERPL CALC-SCNC: 14 MMOL/L (ref 10–20)
AST SERPL W P-5'-P-CCNC: 19 U/L (ref 9–39)
BASOPHILS # BLD AUTO: 0.02 X10*3/UL (ref 0–0.1)
BASOPHILS NFR BLD AUTO: 0.5 %
BILIRUB SERPL-MCNC: 0.5 MG/DL (ref 0–1.2)
BUN SERPL-MCNC: 31 MG/DL (ref 6–23)
CALCIUM SERPL-MCNC: 10 MG/DL (ref 8.6–10.6)
CHLORIDE SERPL-SCNC: 96 MMOL/L (ref 98–107)
CO2 SERPL-SCNC: 26 MMOL/L (ref 21–32)
CREAT SERPL-MCNC: 7.19 MG/DL (ref 0.5–1.3)
CRP SERPL-MCNC: 9.05 MG/DL
EGFRCR SERPLBLD CKD-EPI 2021: 8 ML/MIN/1.73M*2
EOSINOPHIL # BLD AUTO: 0.2 X10*3/UL (ref 0–0.7)
EOSINOPHIL NFR BLD AUTO: 4.9 %
ERYTHROCYTE [DISTWIDTH] IN BLOOD BY AUTOMATED COUNT: 14.1 % (ref 11.5–14.5)
ERYTHROCYTE [SEDIMENTATION RATE] IN BLOOD BY WESTERGREN METHOD: 32 MM/H (ref 0–20)
GLUCOSE SERPL-MCNC: 376 MG/DL (ref 74–99)
HCT VFR BLD AUTO: 23.1 % (ref 41–52)
HGB BLD-MCNC: 7.6 G/DL (ref 13.5–17.5)
IMM GRANULOCYTES # BLD AUTO: 0.01 X10*3/UL (ref 0–0.7)
IMM GRANULOCYTES NFR BLD AUTO: 0.2 % (ref 0–0.9)
LYMPHOCYTES # BLD AUTO: 0.42 X10*3/UL (ref 1.2–4.8)
LYMPHOCYTES NFR BLD AUTO: 10.4 %
MAGNESIUM SERPL-MCNC: 1.83 MG/DL (ref 1.6–2.4)
MCH RBC QN AUTO: 27 PG (ref 26–34)
MCHC RBC AUTO-ENTMCNC: 32.9 G/DL (ref 32–36)
MCV RBC AUTO: 82 FL (ref 80–100)
MONOCYTES # BLD AUTO: 0.48 X10*3/UL (ref 0.1–1)
MONOCYTES NFR BLD AUTO: 11.9 %
NEUTROPHILS # BLD AUTO: 2.92 X10*3/UL (ref 1.2–7.7)
NEUTROPHILS NFR BLD AUTO: 72.1 %
NRBC BLD-RTO: 0 /100 WBCS (ref 0–0)
PLATELET # BLD AUTO: 109 X10*3/UL (ref 150–450)
POTASSIUM SERPL-SCNC: 5.6 MMOL/L (ref 3.5–5.3)
PROT SERPL-MCNC: 7.4 G/DL (ref 6.4–8.2)
RBC # BLD AUTO: 2.81 X10*6/UL (ref 4.5–5.9)
SODIUM SERPL-SCNC: 130 MMOL/L (ref 136–145)
WBC # BLD AUTO: 4.1 X10*3/UL (ref 4.4–11.3)

## 2025-05-20 PROCEDURE — 99285 EMERGENCY DEPT VISIT HI MDM: CPT | Mod: 25 | Performed by: EMERGENCY MEDICINE

## 2025-05-20 PROCEDURE — 96374 THER/PROPH/DIAG INJ IV PUSH: CPT | Mod: 59

## 2025-05-20 PROCEDURE — 99285 EMERGENCY DEPT VISIT HI MDM: CPT | Performed by: EMERGENCY MEDICINE

## 2025-05-20 PROCEDURE — 87075 CULTR BACTERIA EXCEPT BLOOD: CPT | Mod: 59

## 2025-05-20 PROCEDURE — 80053 COMPREHEN METABOLIC PANEL: CPT

## 2025-05-20 PROCEDURE — 86140 C-REACTIVE PROTEIN: CPT

## 2025-05-20 PROCEDURE — 36415 COLL VENOUS BLD VENIPUNCTURE: CPT

## 2025-05-20 PROCEDURE — 85652 RBC SED RATE AUTOMATED: CPT

## 2025-05-20 PROCEDURE — 85025 COMPLETE CBC W/AUTO DIFF WBC: CPT

## 2025-05-20 PROCEDURE — 20610 DRAIN/INJ JOINT/BURSA W/O US: CPT

## 2025-05-20 PROCEDURE — 73030 X-RAY EXAM OF SHOULDER: CPT | Mod: RIGHT SIDE | Performed by: STUDENT IN AN ORGANIZED HEALTH CARE EDUCATION/TRAINING PROGRAM

## 2025-05-20 PROCEDURE — 73030 X-RAY EXAM OF SHOULDER: CPT | Mod: RT

## 2025-05-20 PROCEDURE — 2500000004 HC RX 250 GENERAL PHARMACY W/ HCPCS (ALT 636 FOR OP/ED)

## 2025-05-20 PROCEDURE — 89051 BODY FLUID CELL COUNT: CPT

## 2025-05-20 PROCEDURE — 87040 BLOOD CULTURE FOR BACTERIA: CPT

## 2025-05-20 PROCEDURE — 89060 EXAM SYNOVIAL FLUID CRYSTALS: CPT

## 2025-05-20 PROCEDURE — 2500000004 HC RX 250 GENERAL PHARMACY W/ HCPCS (ALT 636 FOR OP/ED): Mod: JZ,TB

## 2025-05-20 PROCEDURE — 83735 ASSAY OF MAGNESIUM: CPT

## 2025-05-20 RX ORDER — LIDOCAINE HYDROCHLORIDE AND EPINEPHRINE 10; 10 UG/ML; MG/ML
INJECTION, SOLUTION INFILTRATION; PERINEURAL
Status: COMPLETED
Start: 2025-05-20 | End: 2025-05-20

## 2025-05-20 RX ORDER — LIDOCAINE HYDROCHLORIDE AND EPINEPHRINE 10; 10 UG/ML; MG/ML
20 INJECTION, SOLUTION INFILTRATION; PERINEURAL ONCE
Status: COMPLETED | OUTPATIENT
Start: 2025-05-20 | End: 2025-05-20

## 2025-05-20 RX ADMIN — LIDOCAINE HYDROCHLORIDE,EPINEPHRINE BITARTRATE 20 ML: 10; .01 INJECTION, SOLUTION INFILTRATION; PERINEURAL at 23:28

## 2025-05-20 RX ADMIN — HYDROMORPHONE HYDROCHLORIDE 0.5 MG: 1 INJECTION, SOLUTION INTRAMUSCULAR; INTRAVENOUS; SUBCUTANEOUS at 22:39

## 2025-05-20 RX ADMIN — LIDOCAINE HYDROCHLORIDE AND EPINEPHRINE 20 ML: 10; 10 INJECTION, SOLUTION INFILTRATION; PERINEURAL at 23:28

## 2025-05-20 ASSESSMENT — PAIN DESCRIPTION - PAIN TYPE: TYPE: ACUTE PAIN

## 2025-05-20 ASSESSMENT — PAIN DESCRIPTION - ORIENTATION: ORIENTATION: LEFT

## 2025-05-20 ASSESSMENT — PAIN SCALES - GENERAL: PAINLEVEL_OUTOF10: 10 - WORST POSSIBLE PAIN

## 2025-05-20 ASSESSMENT — PAIN - FUNCTIONAL ASSESSMENT: PAIN_FUNCTIONAL_ASSESSMENT: 0-10

## 2025-05-20 ASSESSMENT — PAIN DESCRIPTION - LOCATION: LOCATION: NECK

## 2025-05-20 NOTE — ED TRIAGE NOTES
PT ENDORSES R SHOULDER PAIN. PT STATES THAT HE HAD A PORT PLACED 2 MONTHS AGO AND DOCTOR IS CONCERNED FOR INFECTION. T, TH, SAT DIALYSIS. PT REFUSED BP.

## 2025-05-21 ENCOUNTER — CLINICAL SUPPORT (OUTPATIENT)
Dept: EMERGENCY MEDICINE | Facility: HOSPITAL | Age: 70
End: 2025-05-21
Payer: COMMERCIAL

## 2025-05-21 ENCOUNTER — APPOINTMENT (OUTPATIENT)
Dept: RADIOLOGY | Facility: HOSPITAL | Age: 70
End: 2025-05-21
Payer: COMMERCIAL

## 2025-05-21 ENCOUNTER — PHARMACY VISIT (OUTPATIENT)
Dept: PHARMACY | Facility: CLINIC | Age: 70
End: 2025-05-21
Payer: COMMERCIAL

## 2025-05-21 ENCOUNTER — APPOINTMENT (OUTPATIENT)
Dept: GASTROENTEROLOGY | Facility: HOSPITAL | Age: 70
End: 2025-05-21
Payer: MEDICARE

## 2025-05-21 VITALS
BODY MASS INDEX: 23.49 KG/M2 | RESPIRATION RATE: 18 BRPM | SYSTOLIC BLOOD PRESSURE: 186 MMHG | DIASTOLIC BLOOD PRESSURE: 94 MMHG | HEIGHT: 68 IN | WEIGHT: 155 LBS | TEMPERATURE: 98.4 F | OXYGEN SATURATION: 95 % | HEART RATE: 84 BPM

## 2025-05-21 VITALS
TEMPERATURE: 99.6 F | OXYGEN SATURATION: 95 % | HEART RATE: 77 BPM | RESPIRATION RATE: 16 BRPM | SYSTOLIC BLOOD PRESSURE: 154 MMHG | DIASTOLIC BLOOD PRESSURE: 77 MMHG

## 2025-05-21 DIAGNOSIS — M84.421A: Primary | ICD-10-CM

## 2025-05-21 LAB
ABO GROUP (TYPE) IN BLOOD: NORMAL
ANTIBODY SCREEN: NORMAL
APTT PPP: 29 SECONDS (ref 26–36)
ATRIAL RATE: 74 BPM
BASOPHILS NFR FLD MANUAL: 3 %
CLARITY FLD: ABNORMAL
COLOR FLD: ABNORMAL
CRYSTALS FLD MICRO: NORMAL
EOSINOPHIL NFR FLD MANUAL: NORMAL %
INR PPP: 1.1 (ref 0.9–1.1)
LYMPHOCYTES NFR FLD MANUAL: 2 %
MONOS+MACROS NFR FLD MANUAL: 7 %
NEUTROPHILS NFR FLD MANUAL: 88 %
P AXIS: 72 DEGREES
P OFFSET: 189 MS
P ONSET: 126 MS
PR INTERVAL: 180 MS
PROTHROMBIN TIME: 12.5 SECONDS (ref 9.8–12.4)
Q ONSET: 216 MS
QRS COUNT: 12 BEATS
QRS DURATION: 140 MS
QT INTERVAL: 396 MS
QTC CALCULATION(BAZETT): 439 MS
QTC FREDERICIA: 425 MS
R AXIS: 198 DEGREES
RBC # FLD AUTO: ABNORMAL /UL
RH FACTOR (ANTIGEN D): NORMAL
T AXIS: 38 DEGREES
T OFFSET: 414 MS
TOTAL CELLS COUNTED FLD: 100
VENTRICULAR RATE: 74 BPM
WBC # FLD AUTO: 2418 /UL

## 2025-05-21 PROCEDURE — 71045 X-RAY EXAM CHEST 1 VIEW: CPT

## 2025-05-21 PROCEDURE — 93005 ELECTROCARDIOGRAM TRACING: CPT

## 2025-05-21 PROCEDURE — 73200 CT UPPER EXTREMITY W/O DYE: CPT | Mod: RIGHT SIDE | Performed by: STUDENT IN AN ORGANIZED HEALTH CARE EDUCATION/TRAINING PROGRAM

## 2025-05-21 PROCEDURE — 99221 1ST HOSP IP/OBS SF/LOW 40: CPT

## 2025-05-21 PROCEDURE — 99285 EMERGENCY DEPT VISIT HI MDM: CPT | Performed by: PHYSICIAN ASSISTANT

## 2025-05-21 PROCEDURE — 2500000001 HC RX 250 WO HCPCS SELF ADMINISTERED DRUGS (ALT 637 FOR MEDICARE OP): Performed by: PHYSICIAN ASSISTANT

## 2025-05-21 PROCEDURE — 86850 RBC ANTIBODY SCREEN: CPT

## 2025-05-21 PROCEDURE — 73200 CT UPPER EXTREMITY W/O DYE: CPT | Mod: RT

## 2025-05-21 PROCEDURE — 36415 COLL VENOUS BLD VENIPUNCTURE: CPT

## 2025-05-21 PROCEDURE — 71045 X-RAY EXAM CHEST 1 VIEW: CPT | Performed by: STUDENT IN AN ORGANIZED HEALTH CARE EDUCATION/TRAINING PROGRAM

## 2025-05-21 PROCEDURE — 85610 PROTHROMBIN TIME: CPT

## 2025-05-21 PROCEDURE — 99284 EMERGENCY DEPT VISIT MOD MDM: CPT | Mod: 25 | Performed by: EMERGENCY MEDICINE

## 2025-05-21 PROCEDURE — RXMED WILLOW AMBULATORY MEDICATION CHARGE

## 2025-05-21 RX ORDER — OXYCODONE AND ACETAMINOPHEN 5; 325 MG/1; MG/1
1 TABLET ORAL ONCE
Refills: 0 | Status: COMPLETED | OUTPATIENT
Start: 2025-05-21 | End: 2025-05-21

## 2025-05-21 RX ORDER — OXYCODONE AND ACETAMINOPHEN 5; 325 MG/1; MG/1
1 TABLET ORAL EVERY 8 HOURS PRN
Qty: 10 TABLET | Refills: 0 | Status: SHIPPED | OUTPATIENT
Start: 2025-05-21 | End: 2025-05-23 | Stop reason: SDUPTHER

## 2025-05-21 RX ADMIN — OXYCODONE HYDROCHLORIDE AND ACETAMINOPHEN 1 TABLET: 5; 325 TABLET ORAL at 07:43

## 2025-05-21 ASSESSMENT — PAIN SCALES - GENERAL
PAINLEVEL_OUTOF10: 10 - WORST POSSIBLE PAIN
PAINLEVEL_OUTOF10: 10 - WORST POSSIBLE PAIN

## 2025-05-21 ASSESSMENT — LIFESTYLE VARIABLES
TOTAL SCORE: 0
EVER HAD A DRINK FIRST THING IN THE MORNING TO STEADY YOUR NERVES TO GET RID OF A HANGOVER: NO
HAVE YOU EVER FELT YOU SHOULD CUT DOWN ON YOUR DRINKING: NO
EVER FELT BAD OR GUILTY ABOUT YOUR DRINKING: NO
HAVE PEOPLE ANNOYED YOU BY CRITICIZING YOUR DRINKING: NO

## 2025-05-21 ASSESSMENT — PAIN - FUNCTIONAL ASSESSMENT: PAIN_FUNCTIONAL_ASSESSMENT: 0-10

## 2025-05-21 ASSESSMENT — PAIN DESCRIPTION - LOCATION: LOCATION: SHOULDER

## 2025-05-21 ASSESSMENT — PAIN DESCRIPTION - ORIENTATION: ORIENTATION: RIGHT

## 2025-05-21 ASSESSMENT — PAIN DESCRIPTION - PAIN TYPE: TYPE: ACUTE PAIN

## 2025-05-21 NOTE — ED PROVIDER NOTES
History of Present Illness     History provided by: Patient  Limitations to History: None    HPI:  Manolo Bashir is a 69 y.o. male with a past medical history of ESRD s/p renal transplants x2, non-functioning: initially in 1992 c/b allograft failure 2006, 2nd transplant 2013, c/b impaired allograft function, currently on iHD since May 2024(TTS); on tacrolimus and prednisone 5mg, MGUS, DMII, hypertension, prostate cancer s/p radical prostatectomy, HCV (treated w/ Harvoni, HCV PCR negative 2019), recent history of C. diff (treated w/ vancomycin PO 10 day course EOT 2/6/25), development of graft intolerance, currently being evaluated for nephrectomy due to persistent graft related left lower quadrant pain, and gastroparesis, presenting to the ED with a swollen right shoulder pain.  Patient states that the pain occurred after the catheter was placed for dialysis.  He was seen by orthopedics on 5/16 and had a large fluid collection, they aspirated the shoulder and obtained 20 cc of bloody fluid that was sent for analysis, no results so far. Dialysis is Tuesday Thursday Saturday, patient states he has not missed any sessions. He denies any other concerns today including shortness of breath, chest pain, dizziness.     Physical Exam   Triage vitals:  T 37.6 °C (99.6 °F)  HR 95  /65  RR 19  O2 98 % None (Room air)    General: Awake, alert, in no acute distress  Eyes: Gaze conjugate.  No scleral icterus or injection  HENT: Normo-cephalic, atraumatic. No stridor  CV: Regular rate, regular rhythm. Radial pulses 2+ bilaterally  Resp: Breathing non-labored, speaking in full sentences.  Clear to auscultation bilaterally  GI: Soft, non-distended, non-tender. No rebound or guarding.  MSK/Extremities: Significant swelling and tenderness to right shoulder.  Unable to internally externally rotate.  Pulses intact. pen no overlying erythema.  No gross bony deformities. Moving all extremities  Skin: Warm. Appropriate  color  Neuro: Alert. Oriented. Face symmetric. Speech is fluent.  Gross strength and sensation intact in b/l UE and LEs  Psych: Appropriate mood and affect    Medical Decision Making & ED Course   Medical Decision Makin y.o. male presenting with right shoulder pain.. Triage vital signs reviewed and patient is hemodynamically stable at this time.  Reviewed orthopedic note in clinic on  with a right shoulder was aspirated with 20 cc of bloody fluid however the labs have not resulted and do not appear to be in process within the [facility] system.  Given patient has significant pain that was not controlled with medication is now presenting after orthopedic evaluation outpatient, I am concerned for possible septic shoulder versus fracture.  I have low concern for systemic infection, patient does not have signs of sepsis, however given his past medical history and immunocompromise status we will collect blood cultures at this time.  Labs with ESR CRP ordered. there is no overlying skin changes or erythema.  Right shoulder was aspirated under sterile conditions by orthopedic surgery who was consulted.  12 cc of bloody fluid removed.  Patient signed out to incoming provider pending results of aspiration, laboratory workup and further recommendations per team.    ED Course:  Diagnoses as of 25 1132   Chronic right shoulder pain       Independent Result Review and Interpretation: Relevant laboratory and radiographic results were reviewed and independently interpreted by myself.  As necessary, they are commented on in the ED Course.    Care Considerations: As documented above in MDM      Disposition   Patient was signed out to incoming provider at 11 pending completion of their work-up.  Please see the next provider's transition of care note for the remainder of the patient's care.     Procedures   Procedures    Patient seen and discussed with ED attending physician.    Barbi Bocanegra, DO  Emergency Medicine      Barbi Bocanegra,   Resident  05/22/25 1130

## 2025-05-21 NOTE — DISCHARGE INSTRUCTIONS
We are giving a prescription of that.  Please return to emergency room for any new or worsening symptoms.  Please come back if you have any fevers, chills, nausea vomiting and the shoulder appears war,  hot,  red.  Please follow-up with your orthopedic surgery outpatient.

## 2025-05-21 NOTE — PROGRESS NOTES
Emergency Department Transition of Care Note       Signout   I received Manolo Bashir in signout from Dr. Bocanegra.  Please see the ED Provider Note for all HPI, PE and MDM up to the time of signout at 11 pm.  This is in addition to the primary record.    Manolo Bashir is a 69 y.o. male with a past medical history of ESRD s/p renal transplants x2, non-functioning: initially in 1992 c/b allograft failure 2006, 2nd transplant 2013, c/b impaired allograft function, currently on iHD since May 2024(TTS); on tacrolimus and prednisone 5mg, MGUS, DMII, hypertension, prostate cancer s/p radical prostatectomy, HCV (treated w/ Harvoni, HCV PCR negative 2019), recent history of C. diff (treated w/ vancomycin PO 10 day course EOT 2/6/25), development of graft intolerance, currently being evaluated for nephrectomy due to persistent graft related left lower quadrant pain, and gastroparesis, presenting to the ED with a swollen right shoulder pain.     At the time of signout we were awaiting:  Orthopedic recommendations and labs.    ED Course & Medical Decision Making   Medical Decision Making:  Under my care, patient is vitally stable, no acute distress.  Patient is not complaining of any chest pain.  Prior to my arrival, Patient Was Seen by Orthopedic Surgery did a joint aspiration of the shoulder.  The fluid cell count showed RBCs however no significant leukocytosis.  No crystals were identified, the sterile fluid culture smear shows no growth which makes this less concerning for a bacterial shoulder septic joint.  It is possible that this could be a arthritis flareup or referred pain from the dialysis catheter.  Orthopedic surgery recommend noes orthopedic intervention.  Patient does have orthopedic follow-up this coming Friday and was recommended to follow-up with the orthopedic surgeon outpatient.  Patient vies stable prior to be discharged from emergency room.  Patient was given a prescription of Percocet for the pain control  at home.    ED Course:  Diagnoses as of 05/21/25 1733   Chronic right shoulder pain       Disposition   As a result of the work-up, the patient was discharged home.  he was informed of his diagnosis and instructed to come back with any concerns or worsening of condition.  he and was agreeable to the plan as discussed above.  he was given the opportunity to ask questions.  All of the patient's questions were answered.    Procedures   Procedures    Patient seen and discussed with ED attending physician.    Kristina Rodriguez MD  Emergency Medicine

## 2025-05-21 NOTE — DISCHARGE INSTRUCTIONS
You were found to have something called a pathologic fracture of your right shoulder.  This means that there was a weakness caused to the bones and it broke without an injury.  This weakness could be caused by multiple things including your kidney disease, your MGUS, or another type of tumor.  You need to get an outpatient MRI for further workup of this.  Please follow-up with orthopedics as an outpatient.

## 2025-05-21 NOTE — SIGNIFICANT EVENT
Ortho Plan of Care Note    CT right shoulder reviewed with Dr. Matias.  No acute traumatic dislocation or derangement of the right shoulder girdle.  Concentric reduction of humeral head in relation to glenoid on axial cuts.    Appearance of several lytic lesions throughout shoulder girdle. Possibly brown tumors given history of end stage renal disease.    Please obtained MRI right shoulder with and without contrast.    Ortho day team to follow.    While admitted, this patient will be followed by the Ortho Trauma Team. Please contact below residents with any questions (available via Epic Chat).     First call: Yahaira Ratliff, PGY-1  Second call: Meño Robert, PGY-2  Third call: Master Melara, PGY-3

## 2025-05-21 NOTE — CONSULTS
ORTHOPAEDIC CONSULTATION     History Of Present Illness  Manolo Bashir is a 69 y.o. male presenting with right shoulder pain.  Orthopedics was consulted for rule out right shoulder septic arthritis.  Patient states that pain has been going on and has been progressively worse since the insertion of a right tunneled hemodialysis catheter in his right upper thorax that was placed in March 2025.  He denies any fevers or chills.  Denies any numbness or tingling in his right upper extremity.  Recently was seen in clinic by Dr. Masterson on 5/16 where the right shoulder was aspirated for 20 cc of bloody fluid, however the labs have not resulted and do not appear to be in process.    He now presents today for further evaluation and workup of potentially right septic shoulder arthritis    Review of Systems: 12 point ROS negative unless stated in HPI    Past Medical History  He has a past medical history of Anemia, Arthritis, Cataract, Chronic kidney disease, stage 3 unspecified (Multi) (09/26/2018), CKD (chronic kidney disease), Cough (02/12/2024), COVID-19 (06/18/2020), Diabetes (Multi), ESRD (end stage renal disease) (Multi), Focal and segmental proliferative glomerulonephritis (12/23/2023), HTN (hypertension), Hyperlipidemia, Other long term (current) drug therapy (07/20/2021), Personal history of other diseases of the circulatory system, Personal history of other infectious and parasitic diseases (08/17/2015), Polyp, colonic (08/17/2023), Primary osteoarthritis of both ankles (08/17/2023), Prostate cancer (Multi), Tubular adenoma of colon (08/17/2023), and Unspecified kidney failure (08/17/2016).    Surgical History  He has a past surgical history that includes Prostatectomy (10/11/2013); Ileostomy (04/25/2017); Other surgical history (04/21/2017); Ileostomy closure (08/17/2015); Other surgical history (08/17/2015); Other surgical history (08/17/2015); US guided percutaneous peritoneal or retroperitoneal fluid collection  drainage (10/20/2022); transplant, kidney, open (1992); transplant, kidney, open (2013); and US guided percutaneous biopsy renal left (Left, 11/20/2023).     Social History  He reports that he has never smoked. He has been exposed to tobacco smoke. He has never used smokeless tobacco.  Drug: Oxycodone. He reports that he does not drink alcohol.    Family History  Family History[1]     Allergies  Patient has no known allergies.    Review of Systems  12 point ROS negative unless stated in HPI     Physical Exam  · Physical Exam:  - Constitutional: No acute distress, cooperative  - Eyes: EOM grossly intact  - Head/Neck: Trachea midline  - Respiratory/Thorax: Normal work of breathing  - Cardiovascular: RRR on peripheral palpation  - Gastrointestinal: Nondistended  - Psychological: Appropriate mood/behavior  - Skin: Warm and dry. Additional findings in musculoskeletal evaluation  - Musculoskeletal:  Right upper extremity:   -Skin intact.  Right tunneled hemodialysis catheter in place without surrounding erythema  - Moderate effusion of the right shoulder, minimal warmth.  Moderate pain with short arc range of motion of the right shoulder  -Fires axillary/AIN/PIN/ulnar distributions  -SILT axillary/radial/median/ulnar distributions  -Hand warm, well perfused  -Palpable radial pulse, cap refill brisk  -Compartments soft and compressible      A full secondary survey of all four extremities was performed and the significant orthopedic findings are noted above.    Last Recorded Vitals  Blood pressure 135/65, pulse 78, temperature 37.6 °C (99.6 °F), temperature source Temporal, resp. rate 18, SpO2 99%.    Imaging:  AP and lateral radiographs of the right shoulder display no acute osseous abnormalities.  No traumatic malalignment or dislocation     Assessment/Plan   69-year-old male who presents for rule out right shoulder septic arthritis.  Right shoulder was aspirated under sterile conditions for 12 cc of bloody fluid that  resulted for 2.4k WBC, 88% PMNs. No crystals. Gram stain without organisms. Cultures pending.    Low concern for acute septic arthritis of the R shoulder given aspiration results.    Recommendations:  - No acute orthopaedic interventions  - Weight bearing status: WBAT RUE  - Antibiotics: none indicated from ortho standpoint  - Analgesia per ED/primary  - Ortho trauma will follow culture results and contact patient if organism grows  - Follow up as needed with orthopaedics  - Please don't hesitate to page with questions.    This consult was seen and evaluated within 30 minutes.    While admitted, this patient will be followed by the Ortho Trauma Team. Please contact below residents with any questions (available via Epic Chat).     First call: Yahaira Ratliff, PGY-1  Second call: Meño Robert, PGY-2  Third call: Master Melara, PGY-3      Kelechi Faith, PGY-2  Orthopaedic Surgery   Available via [employer]         [1]   Family History  Problem Relation Name Age of Onset    Bone cancer Mother      Other (corona's sarcome of the bone marrow) Mother      Prostate cancer Father      Diabetes Other Family Hist     Hypertension Other Family Hist

## 2025-05-21 NOTE — ED PROVIDER NOTES
"Emergency Department Encounter  [facility] EMERGENCY MEDICINE    Patient: Manolo Bashir  MRN: [id number]  : 1955  Date of Evaluation: 2025  ED Provider: Patricia Harry PA-C        History of Present Illness     This is a 69-year-old right-hand-dominant male with medical history of ESRD s/p failed currently on iHD since May 2024(TTS); on tacrolimus and prednisone 5mg, MGUS, DMII, hypertension, prostate cancer s/p radical prostatectomy, HCV (treated w/ Aries, HCV PCR negative ), and gastroparesis, presenting to the ED with a swollen right shoulder pain.  He states that he has been having this pain for approximately 2 months.  Denies any specific injury or trauma.  His pain is worse with attempting to lift the arm over his head.  Was seen by Ortho on  and had a tap done which showed bloody fluid, however samples never resulted in the lab.  Patient came to the ED last night for same issue and was tapped again by Ortho which showed a bloody tap but was otherwise grossly unremarkable.  Ortho did not recommend any antibiotics at this time.  They had recommended discharge with outpatient follow-up.  Patient does have an appointment on Friday.  After patient was dischargedorthopedics had requested a CT scan of the patient's shoulder in addition to the workup that he had already had during his ED visit last night.  Patient returned to the ED for the CT scan which is why he is here at this time.  He denies any fevers or chills.  He states that he has a prescription for Percocet for his pain at home.  He denies any associate paresthesias, weakness or areas of decrease sensation to the right upper extremity.  Denies chest pain or shortness of breath.               Visit Vitals  BP (!) 186/94 (BP Location: Left arm)   Pulse 84   Temp 36.9 °C (98.4 °F)   Resp 18   Ht 1.727 m (5' 8\")   Wt 70.3 kg (155 lb)   SpO2 95%   BMI 23.57 kg/m²   Smoking Status Never   BSA 1.84 m²          Physical " Exam       Triage vitals:  T 36.9 °C (98.4 °F)  HR 84  BP (!) 186/94  RR 18  O2 95 % None (Room air)    Physical Exam     Physical exam:   General: Vitals noted, no distress. Afebrile.   EENT:  Hearing grossly intact. Normal phonation. MMM. Airway patient. PERRL. EOMI.   Neck: No midline tenderness or paraspinal tenderness. FROM.   Cardiac: Regular, rate, rhythm. Normal S1 and S2.  No murmurs, gallops, rubs.   Pulmonary: Good air exchange. Lungs clear bilaterally. No wheezes, rhonchi, rales. No accessory muscle use.   Extremities: No peripheral edema.  Tenderness palpation diffusely to the right shoulder.  Soft tissue swelling noted to the right shoulder compared to left.  No erythema or excess warmth to the joint.  Neurovascular intact distal to area of pain.  Decreased range of motion of the right shoulder compared to the left.  Moves all extremities freely.  Skin: No rash. Warm and Dry.   Neuro: No focal neurologic deficits. CN 2-12 grossly intact. Sensation equal bilaterally. No weakness.       Results       Labs Reviewed - No data to display    CT shoulder right wo IV contrast   Final Result   1. Acute oblique comminuted fracture through the right coracoid   process. This is favored to be a pathologic fracture given lucency at   the base of the coracoid. This may reflecting sequela of brown tumor   in the setting of end-stage renal disease, scalloping from   longstanding hemarthrosis, or neoplasm. MRI would be of value for   further characterization.   2. Findings favoring acute impaction fracture involving the   anteromedial humeral head.   3. Moderate right glenohumeral hemarthrosis. Soft tissue edema about   the shoulder.   4. Pneumonitis involving the right upper and right lower lobes.   5. Moderate right pleural effusion.        I personally reviewed the images/study and I agree with the findings   as stated by Dr. Mitchell Da Silva. This study was interpreted at [facility]  [place], [place].        MACRO:   None        Signed by: Pipe Cunningham 5/21/2025 7:53 AM   Dictation workstation:   [id number]            Medical Decision Making & ED Course         ED Course & [facility]     Medical Decision Making  This is a right-hand-dominant male with past medical history of of ESRD on dialysis who presents to the ED for a CT scan of his right shoulder per orthopedics request.  Patient had initially refused vitals on arrival to the ED.  On my evaluation he was sitting comfortably beside his bed.  Patient does have diffuse tenderness palpation to the right shoulder.  Neurovascular intact distally area of pain.  Patient ordered Percocet for pain.  CT shoulder ordered per orthopedic request.  CT of patient's right shoulder did show an acute oblique comminuted fracture to the right coracoid process.  This was favored to be a pathologic fracture given the lucency at the base of the coracoid.  Per radiology report this could be due to patient's ESRD, longstanding hemarthrosis, neoplasm.  There was also an acute impaction fracture of the anterior medial humeral head.  These results were discussed with the patient.  I discussed the patient again with orthopedics.  Orthopedics had recommended MRI of his shoulder for further characterization of this.  Patient was requesting be discharged at this time and did not want an MRI at this time.  Per discussion with orthopedics this MRI was not emergent and could be done as an outpatient.  Patient was advised to go to his appointment with orthopedics on Friday as scheduled and discuss MRI with the provider at that time.  Patient was agreeable to this plan.  He was advised to follow-up with orthopedics and was discharged from the ED in stable condition.           Diagnoses as of 05/21/25 0838   Pathological fracture of right humerus, unspecified pathological cause, initial encounter ([facility])         Independent Result Review and Interpretation: CT shoulder with  fracture to humeral head    The patient was discussed with the following consultants/services: Orthopedics who recommended CT of the shoulder.  CT shoulder concerning for pathologic fracture.  Ortho recommended nonemergent MRI of patient's shoulder, however patient did not want to wait to have this done here in the ED and elected to have this done as an outpatient and to coordinate this with his outpatient provider on Friday        Disposition   As a result of the work-up, the patient was discharged home.  he was informed of his diagnosis and instructed to come back with any concerns or worsening of condition.  he and was agreeable to the plan as discussed above.  he was given the opportunity to ask questions.  All of the patient's questions were answered.    Procedures     This was a shared visit with an ED attending.  The patient was seen and discussed with the ED attending    Procedures    Patricia Harry PA-C  Emergency Medicine      Patricia Harry PA-C  05/21/25 0838

## 2025-05-23 ENCOUNTER — OFFICE VISIT (OUTPATIENT)
Dept: ORTHOPEDIC SURGERY | Facility: CLINIC | Age: 70
End: 2025-05-23
Payer: COMMERCIAL

## 2025-05-23 DIAGNOSIS — R91.8 GROUND GLASS OPACITY PRESENT ON IMAGING OF LUNG: ICD-10-CM

## 2025-05-23 DIAGNOSIS — M12.811 ROTATOR CUFF ARTHROPATHY OF RIGHT SHOULDER: ICD-10-CM

## 2025-05-23 LAB
BACTERIA BLD AEROBE CULT: ABNORMAL
BACTERIA BLD CULT: ABNORMAL
GRAM STN SPEC: ABNORMAL

## 2025-05-23 PROCEDURE — 1159F MED LIST DOCD IN RCRD: CPT | Performed by: ORTHOPAEDIC SURGERY

## 2025-05-23 PROCEDURE — 99213 OFFICE O/P EST LOW 20 MIN: CPT | Performed by: ORTHOPAEDIC SURGERY

## 2025-05-23 PROCEDURE — 3052F HG A1C>EQUAL 8.0%<EQUAL 9.0%: CPT | Performed by: ORTHOPAEDIC SURGERY

## 2025-05-23 RX ORDER — OXYCODONE AND ACETAMINOPHEN 5; 325 MG/1; MG/1
1 TABLET ORAL EVERY 6 HOURS PRN
Qty: 28 TABLET | Refills: 0 | Status: SHIPPED | OUTPATIENT
Start: 2025-05-23 | End: 2025-06-06

## 2025-05-24 PROCEDURE — RXMED WILLOW AMBULATORY MEDICATION CHARGE

## 2025-05-25 ENCOUNTER — CLINICAL SUPPORT (OUTPATIENT)
Dept: EMERGENCY MEDICINE | Facility: HOSPITAL | Age: 70
End: 2025-05-25
Payer: COMMERCIAL

## 2025-05-25 ENCOUNTER — HOSPITAL ENCOUNTER (INPATIENT)
Facility: HOSPITAL | Age: 70
LOS: 3 days | Discharge: HOME | End: 2025-05-28
Attending: EMERGENCY MEDICINE | Admitting: STUDENT IN AN ORGANIZED HEALTH CARE EDUCATION/TRAINING PROGRAM
Payer: COMMERCIAL

## 2025-05-25 DIAGNOSIS — M25.511 CHRONIC RIGHT SHOULDER PAIN: ICD-10-CM

## 2025-05-25 DIAGNOSIS — Z86.39 H/O DIABETIC GASTROPARESIS: ICD-10-CM

## 2025-05-25 DIAGNOSIS — E78.5 DYSLIPIDEMIA: ICD-10-CM

## 2025-05-25 DIAGNOSIS — K21.9 GASTROESOPHAGEAL REFLUX DISEASE WITHOUT ESOPHAGITIS: ICD-10-CM

## 2025-05-25 DIAGNOSIS — N18.6 ESRD ON HEMODIALYSIS (MULTI): ICD-10-CM

## 2025-05-25 DIAGNOSIS — Z99.2 ESRD ON HEMODIALYSIS (MULTI): ICD-10-CM

## 2025-05-25 DIAGNOSIS — Z99.2 ESRD (END STAGE RENAL DISEASE) ON DIALYSIS (MULTI): ICD-10-CM

## 2025-05-25 DIAGNOSIS — R78.81 POSITIVE BLOOD CULTURE: ICD-10-CM

## 2025-05-25 DIAGNOSIS — Z94.0 KIDNEY REPLACED BY TRANSPLANT (HHS-HCC): ICD-10-CM

## 2025-05-25 DIAGNOSIS — G89.29 CHRONIC RIGHT SHOULDER PAIN: ICD-10-CM

## 2025-05-25 DIAGNOSIS — N18.6 ESRD (END STAGE RENAL DISEASE) ON DIALYSIS (MULTI): ICD-10-CM

## 2025-05-25 DIAGNOSIS — D64.9 ANEMIA, UNSPECIFIED TYPE: Primary | ICD-10-CM

## 2025-05-25 LAB
ALBUMIN SERPL BCP-MCNC: 3 G/DL (ref 3.4–5)
ALP SERPL-CCNC: 80 U/L (ref 33–136)
ALT SERPL W P-5'-P-CCNC: 16 U/L (ref 10–52)
ANION GAP SERPL CALC-SCNC: 13 MMOL/L (ref 10–20)
APPEARANCE FLD: NORMAL
AST SERPL W P-5'-P-CCNC: 13 U/L (ref 9–39)
ATRIAL RATE: 65 BPM
BACTERIA BLD CULT: NORMAL
BACTERIA FLD CULT: NORMAL
BACTERIA SPEC AEROBE CULT: NORMAL
BACTERIA SPEC ANAEROBE CULT: NORMAL
BASOPHILS # BLD AUTO: 0.02 X10*3/UL (ref 0–0.1)
BASOPHILS NFR BLD AUTO: 0.4 %
BASOPHILS NFR FLD MANUAL: 0 %
BILIRUB SERPL-MCNC: 0.4 MG/DL (ref 0–1.2)
BODY FLD TYPE: NORMAL
BUN SERPL-MCNC: 25 MG/DL (ref 6–23)
CALCIUM SERPL-MCNC: 9.6 MG/DL (ref 8.6–10.6)
CHLORIDE SERPL-SCNC: 102 MMOL/L (ref 98–107)
CO2 SERPL-SCNC: 28 MMOL/L (ref 21–32)
COLOR FLD: NORMAL
CREAT SERPL-MCNC: 8.76 MG/DL (ref 0.5–1.3)
EGFRCR SERPLBLD CKD-EPI 2021: 6 ML/MIN/1.73M*2
EOSINOPHIL # BLD AUTO: 0.11 X10*3/UL (ref 0–0.7)
EOSINOPHIL NFR BLD AUTO: 2.3 %
EOSINOPHIL NFR FLD MANUAL: 0 %
ERYTHROCYTE [DISTWIDTH] IN BLOOD BY AUTOMATED COUNT: 14 % (ref 11.5–14.5)
FERRITIN SERPL-MCNC: 1563 NG/ML (ref 20–300)
GLUCOSE BLD MANUAL STRIP-MCNC: 181 MG/DL (ref 74–99)
GLUCOSE SERPL-MCNC: 234 MG/DL (ref 74–99)
GRAM STN SPEC: NORMAL
GRAM STN SPEC: NORMAL
HCT VFR BLD AUTO: 21.9 % (ref 41–52)
HGB BLD-MCNC: 7.1 G/DL (ref 13.5–17.5)
IMM GRANULOCYTES # BLD AUTO: 0.02 X10*3/UL (ref 0–0.7)
IMM GRANULOCYTES NFR BLD AUTO: 0.4 % (ref 0–0.9)
IRON SATN MFR SERPL: 22 % (ref 25–45)
IRON SERPL-MCNC: 25 UG/DL (ref 35–150)
LYMPHOCYTES # BLD AUTO: 0.59 X10*3/UL (ref 1.2–4.8)
LYMPHOCYTES NFR BLD AUTO: 12.3 %
LYMPHOCYTES NFR FLD MANUAL: 14 %
MCH RBC QN AUTO: 26.7 PG (ref 26–34)
MCHC RBC AUTO-ENTMCNC: 32.4 G/DL (ref 32–36)
MCV RBC AUTO: 82 FL (ref 80–100)
MESOTHL CELL NFR FLD MANUAL: 0 %
MONOCYTES # BLD AUTO: 0.48 X10*3/UL (ref 0.1–1)
MONOCYTES NFR BLD AUTO: 10 %
MONOS+MACROS NFR FLD MANUAL: 14 %
NEUTROPHILS # BLD AUTO: 3.57 X10*3/UL (ref 1.2–7.7)
NEUTROPHILS NFR BLD AUTO: 74.6 %
NEUTROPHILS NFR FLD MANUAL: 72 %
NRBC BLD-RTO: 0 /100 WBCS (ref 0–0)
P AXIS: 61 DEGREES
P OFFSET: 166 MS
P ONSET: 125 MS
PLATELET # BLD AUTO: 120 X10*3/UL (ref 150–450)
POTASSIUM SERPL-SCNC: 5.5 MMOL/L (ref 3.5–5.3)
PR INTERVAL: 182 MS
PROT SERPL-MCNC: 6.7 G/DL (ref 6.4–8.2)
Q ONSET: 216 MS
QRS COUNT: 11 BEATS
QRS DURATION: 142 MS
QT INTERVAL: 438 MS
QTC CALCULATION(BAZETT): 455 MS
QTC FREDERICIA: 449 MS
QUEST PATIENT ID APPROVAL TIQ DOC COMMENT: NORMAL
QUEST PATIENT ID APPROVAL TIQ DOC CONTACT: NORMAL
QUEST PATIENT ID APPROVAL TIQ DOC TESTS AFFECTED: NORMAL
R AXIS: 121 DEGREES
RBC # BLD AUTO: 2.66 X10*6/UL (ref 4.5–5.9)
SODIUM SERPL-SCNC: 137 MMOL/L (ref 136–145)
T AXIS: -15 DEGREES
T OFFSET: 435 MS
TIBC SERPL-MCNC: 113 UG/DL (ref 240–445)
UIBC SERPL-MCNC: 88 UG/DL (ref 110–370)
VENTRICULAR RATE: 65 BPM
WBC # BLD AUTO: 4.8 X10*3/UL (ref 4.4–11.3)
WBC # FLD MANUAL: 4750 CELLS/UL

## 2025-05-25 PROCEDURE — 99285 EMERGENCY DEPT VISIT HI MDM: CPT | Performed by: EMERGENCY MEDICINE

## 2025-05-25 PROCEDURE — 85025 COMPLETE CBC W/AUTO DIFF WBC: CPT

## 2025-05-25 PROCEDURE — 82947 ASSAY GLUCOSE BLOOD QUANT: CPT

## 2025-05-25 PROCEDURE — 93005 ELECTROCARDIOGRAM TRACING: CPT

## 2025-05-25 PROCEDURE — 87077 CULTURE AEROBIC IDENTIFY: CPT

## 2025-05-25 PROCEDURE — 1100000001 HC PRIVATE ROOM DAILY

## 2025-05-25 PROCEDURE — 2500000004 HC RX 250 GENERAL PHARMACY W/ HCPCS (ALT 636 FOR OP/ED): Mod: TB

## 2025-05-25 PROCEDURE — 99285 EMERGENCY DEPT VISIT HI MDM: CPT

## 2025-05-25 PROCEDURE — 80053 COMPREHEN METABOLIC PANEL: CPT

## 2025-05-25 PROCEDURE — 87040 BLOOD CULTURE FOR BACTERIA: CPT

## 2025-05-25 PROCEDURE — 36415 COLL VENOUS BLD VENIPUNCTURE: CPT

## 2025-05-25 PROCEDURE — 82728 ASSAY OF FERRITIN: CPT

## 2025-05-25 PROCEDURE — 96375 TX/PRO/DX INJ NEW DRUG ADDON: CPT

## 2025-05-25 PROCEDURE — 83540 ASSAY OF IRON: CPT

## 2025-05-25 PROCEDURE — 2500000004 HC RX 250 GENERAL PHARMACY W/ HCPCS (ALT 636 FOR OP/ED): Mod: JZ,TB

## 2025-05-25 PROCEDURE — 96365 THER/PROPH/DIAG IV INF INIT: CPT

## 2025-05-25 RX ORDER — DEXTROSE 50 % IN WATER (D50W) INTRAVENOUS SYRINGE
25
Status: DISCONTINUED | OUTPATIENT
Start: 2025-05-25 | End: 2025-05-28 | Stop reason: HOSPADM

## 2025-05-25 RX ORDER — INSULIN GLARGINE 100 [IU]/ML
15 INJECTION, SOLUTION SUBCUTANEOUS EVERY MORNING
Status: DISCONTINUED | OUTPATIENT
Start: 2025-05-26 | End: 2025-05-28 | Stop reason: HOSPADM

## 2025-05-25 RX ORDER — VANCOMYCIN HYDROCHLORIDE 1 G/200ML
1000 INJECTION, SOLUTION INTRAVENOUS ONCE
Status: COMPLETED | OUTPATIENT
Start: 2025-05-25 | End: 2025-05-25

## 2025-05-25 RX ORDER — INSULIN LISPRO 100 [IU]/ML
0-5 INJECTION, SOLUTION INTRAVENOUS; SUBCUTANEOUS
Status: DISCONTINUED | OUTPATIENT
Start: 2025-05-26 | End: 2025-05-28 | Stop reason: HOSPADM

## 2025-05-25 RX ORDER — DEXTROSE 50 % IN WATER (D50W) INTRAVENOUS SYRINGE
12.5
Status: DISCONTINUED | OUTPATIENT
Start: 2025-05-25 | End: 2025-05-28 | Stop reason: HOSPADM

## 2025-05-25 RX ADMIN — VANCOMYCIN HYDROCHLORIDE 1000 MG: 1 INJECTION, SOLUTION INTRAVENOUS at 18:10

## 2025-05-25 RX ADMIN — HYDROMORPHONE HYDROCHLORIDE 0.4 MG: 1 INJECTION, SOLUTION INTRAMUSCULAR; INTRAVENOUS; SUBCUTANEOUS at 23:01

## 2025-05-25 RX ADMIN — HYDROMORPHONE HYDROCHLORIDE 0.5 MG: 1 INJECTION, SOLUTION INTRAMUSCULAR; INTRAVENOUS; SUBCUTANEOUS at 18:10

## 2025-05-25 SDOH — ECONOMIC STABILITY: FOOD INSECURITY: WITHIN THE PAST 12 MONTHS, YOU WORRIED THAT YOUR FOOD WOULD RUN OUT BEFORE YOU GOT THE MONEY TO BUY MORE.: NEVER TRUE

## 2025-05-25 SDOH — ECONOMIC STABILITY: HOUSING INSECURITY: AT ANY TIME IN THE PAST 12 MONTHS, WERE YOU HOMELESS OR LIVING IN A SHELTER (INCLUDING NOW)?: NO

## 2025-05-25 SDOH — SOCIAL STABILITY: SOCIAL INSECURITY: WITHIN THE LAST YEAR, HAVE YOU BEEN AFRAID OF YOUR PARTNER OR EX-PARTNER?: NO

## 2025-05-25 SDOH — SOCIAL STABILITY: SOCIAL INSECURITY: ARE YOU OR HAVE YOU BEEN THREATENED OR ABUSED PHYSICALLY, EMOTIONALLY, OR SEXUALLY BY ANYONE?: NO

## 2025-05-25 SDOH — ECONOMIC STABILITY: TRANSPORTATION INSECURITY: IN THE PAST 12 MONTHS, HAS LACK OF TRANSPORTATION KEPT YOU FROM MEDICAL APPOINTMENTS OR FROM GETTING MEDICATIONS?: NO

## 2025-05-25 SDOH — ECONOMIC STABILITY: INCOME INSECURITY: IN THE PAST 12 MONTHS HAS THE ELECTRIC, GAS, OIL, OR WATER COMPANY THREATENED TO SHUT OFF SERVICES IN YOUR HOME?: NO

## 2025-05-25 SDOH — SOCIAL STABILITY: SOCIAL INSECURITY: WERE YOU ABLE TO COMPLETE ALL THE BEHAVIORAL HEALTH SCREENINGS?: YES

## 2025-05-25 SDOH — ECONOMIC STABILITY: HOUSING INSECURITY: IN THE PAST 12 MONTHS, HOW MANY TIMES HAVE YOU MOVED WHERE YOU WERE LIVING?: 0

## 2025-05-25 SDOH — SOCIAL STABILITY: SOCIAL INSECURITY: WITHIN THE LAST YEAR, HAVE YOU BEEN HUMILIATED OR EMOTIONALLY ABUSED IN OTHER WAYS BY YOUR PARTNER OR EX-PARTNER?: NO

## 2025-05-25 SDOH — ECONOMIC STABILITY: HOUSING INSECURITY: IN THE LAST 12 MONTHS, WAS THERE A TIME WHEN YOU WERE NOT ABLE TO PAY THE MORTGAGE OR RENT ON TIME?: NO

## 2025-05-25 SDOH — ECONOMIC STABILITY: FOOD INSECURITY: HOW HARD IS IT FOR YOU TO PAY FOR THE VERY BASICS LIKE FOOD, HOUSING, MEDICAL CARE, AND HEATING?: NOT HARD AT ALL

## 2025-05-25 SDOH — ECONOMIC STABILITY: FOOD INSECURITY: WITHIN THE PAST 12 MONTHS, THE FOOD YOU BOUGHT JUST DIDN'T LAST AND YOU DIDN'T HAVE MONEY TO GET MORE.: NEVER TRUE

## 2025-05-25 SDOH — SOCIAL STABILITY: SOCIAL INSECURITY: ARE THERE ANY APPARENT SIGNS OF INJURIES/BEHAVIORS THAT COULD BE RELATED TO ABUSE/NEGLECT?: NO

## 2025-05-25 SDOH — SOCIAL STABILITY: SOCIAL INSECURITY: HAVE YOU HAD THOUGHTS OF HARMING ANYONE ELSE?: YES

## 2025-05-25 SDOH — SOCIAL STABILITY: SOCIAL INSECURITY: DOES ANYONE TRY TO KEEP YOU FROM HAVING/CONTACTING OTHER FRIENDS OR DOING THINGS OUTSIDE YOUR HOME?: NO

## 2025-05-25 SDOH — SOCIAL STABILITY: SOCIAL INSECURITY: HAS ANYONE EVER THREATENED TO HURT YOUR FAMILY OR YOUR PETS?: NO

## 2025-05-25 SDOH — SOCIAL STABILITY: SOCIAL INSECURITY: HAVE YOU HAD ANY THOUGHTS OF HARMING ANYONE ELSE?: NO

## 2025-05-25 SDOH — SOCIAL STABILITY: SOCIAL INSECURITY: ABUSE: ADULT

## 2025-05-25 SDOH — SOCIAL STABILITY: SOCIAL INSECURITY: DO YOU FEEL UNSAFE GOING BACK TO THE PLACE WHERE YOU ARE LIVING?: NO

## 2025-05-25 SDOH — SOCIAL STABILITY: SOCIAL INSECURITY: DO YOU FEEL ANYONE HAS EXPLOITED OR TAKEN ADVANTAGE OF YOU FINANCIALLY OR OF YOUR PERSONAL PROPERTY?: NO

## 2025-05-25 ASSESSMENT — COGNITIVE AND FUNCTIONAL STATUS - GENERAL
DAILY ACTIVITIY SCORE: 24
PATIENT BASELINE BEDBOUND: NO
MOBILITY SCORE: 24

## 2025-05-25 ASSESSMENT — PAIN SCALES - GENERAL
PAINLEVEL_OUTOF10: 6
PAINLEVEL_OUTOF10: 8
PAINLEVEL_OUTOF10: 0 - NO PAIN

## 2025-05-25 ASSESSMENT — ACTIVITIES OF DAILY LIVING (ADL)
PATIENT'S MEMORY ADEQUATE TO SAFELY COMPLETE DAILY ACTIVITIES?: YES
TOILETING: INDEPENDENT
FEEDING YOURSELF: INDEPENDENT
JUDGMENT_ADEQUATE_SAFELY_COMPLETE_DAILY_ACTIVITIES: YES
GROOMING: INDEPENDENT
ADEQUATE_TO_COMPLETE_ADL: YES
DRESSING YOURSELF: INDEPENDENT
HEARING - LEFT EAR: FUNCTIONAL
LACK_OF_TRANSPORTATION: NO
HEARING - RIGHT EAR: FUNCTIONAL
BATHING: INDEPENDENT
WALKS IN HOME: INDEPENDENT

## 2025-05-25 ASSESSMENT — LIFESTYLE VARIABLES
AUDIT-C TOTAL SCORE: 0
HOW OFTEN DO YOU HAVE A DRINK CONTAINING ALCOHOL: NEVER
SKIP TO QUESTIONS 9-10: 1
HOW MANY STANDARD DRINKS CONTAINING ALCOHOL DO YOU HAVE ON A TYPICAL DAY: PATIENT DOES NOT DRINK
HOW OFTEN DO YOU HAVE 6 OR MORE DRINKS ON ONE OCCASION: NEVER
AUDIT-C TOTAL SCORE: 0

## 2025-05-25 ASSESSMENT — PAIN DESCRIPTION - DESCRIPTORS: DESCRIPTORS: DISCOMFORT

## 2025-05-25 ASSESSMENT — PAIN DESCRIPTION - LOCATION: LOCATION: SHOULDER

## 2025-05-25 ASSESSMENT — PAIN - FUNCTIONAL ASSESSMENT
PAIN_FUNCTIONAL_ASSESSMENT: 0-10
PAIN_FUNCTIONAL_ASSESSMENT: 0-10

## 2025-05-25 ASSESSMENT — PAIN DESCRIPTION - PAIN TYPE: TYPE: ACUTE PAIN

## 2025-05-25 ASSESSMENT — PAIN DESCRIPTION - ORIENTATION: ORIENTATION: RIGHT

## 2025-05-25 NOTE — PROGRESS NOTES
Emergency Department Transition of Care Note       Signout   I received Manolo Bashir in signout from off coming provider.  Please see the ED Provider Note for all HPI, PE and MDM up to the time of signout at 7 pm.  This is in addition to the primary record.    This is a 69-year-old right-hand-dominant male with medical history of ESRD s/p failed transplant currently on iHD since May 2024(TTS); on tacrolimus and prednisone 5mg, MGUS, DMII, hypertension, prostate cancer s/p radical prostatectomy, HCV (treated w/ Aries, HCV PCR negative 2019), and gastroparesis, presenting to the ED for positive blood culture.    At the time of signout we were awaiting:  Remainder of labs.     ED Course & Medical Decision Making   Medical Decision Making:  Under my care, the patient was stable. CMP shows an elevated potassium of 5.5.  Patient reports that he did not recently complete his full course of hemodialysis and is not scheduled for dialysis again until Tuesday. The patient has a recent positive blood culture, and after repeat blood cultures x 2 were sent today, the patient was empirically treated with a dose of IV vancomycin.  Patient was informed of the results and was recommended admission to the hospital for further management and monitoring. Patient was eventually agreeable to admission. Patient was discussed with the Admissions Coordinator and the patient was accepted for admission to the hospital under the medicine service for further management and monitoring.     ED Course:  ED Course as of 05/25/25 2221   Sun May 25, 2025   1913 CBC shows a normocytic anemia with a hemoglobin of 7.1 however is similar to his baseline. [YG]   1952 Patient does have a hyperkalemia 5.5.  Patient recently went to dialysis however did not complete his dialysis on Saturday.  Given the patient's need for dialysis along with the holiday, would recommend admission for dialysis in the setting of hyperkalemia. [YG]      ED Course User  Index  [YG] Kristina Rodriguez MD         Diagnoses as of 05/25/25 2221   Anemia, unspecified type   Positive blood culture   Chronic right shoulder pain       Disposition   As a result of their workup, the patient will require admission to the hospital.  The patient was informed of his diagnosis.  The patient was given the opportunity to ask questions and I answered them. The patient agreed to be admitted to the hospital.    Procedures   Procedures    Patient seen and discussed with ED attending physician.    Kristina Rodriguez MD  Emergency Medicine

## 2025-05-25 NOTE — ED PROVIDER NOTES
HPI   Chief Complaint   Patient presents with    Shoulder Pain       This is a 69-year-old right-hand-dominant male with medical history of ESRD s/p failed transplant currently on iHD since May 2024(on dialysis TTS); on tacrolimus and prednisone 5mg, MGUS, DMII, hypertension, prostate cancer s/p radical prostatectomy, HCV (treated w/ Aries, HCV PCR negative 2019), and gastroparesis, presenting to the ED for positive blood culture.    Patient was seen and evaluated in the ED initially on 5/20/25 for right-sided shoulder pain ongoing for the last 2 months.  Patient was previously seen and evaluated in outpatient setting on 5/16 for where he had a shoulder aspiration performed obtaining 20 cc of bloody fluid.  They bahman blood cultures at that time on 5/20 due to worsening swelling and pain to his shoulder.  They performed another shoulder aspiration to the right shoulder in the ED and patient was eventually discharged home though presented again on 5/21 to the ED for CT that orthopedic surgery requested.  He got the CT of his shoulder and they recommended MRI of the shoulder but he did not want a wait so they plan for the patient to receive MRI outpatient.  He saw orthopedic surgery yesterday on 5/23 where they ordered the MRI but he has not received it yet.  Today, patient was notified that one of the blood cultures came back positive for Staphylococcus hominis though the other blood culture is still negative.  Patient was told to come in today for evaluation due to positive blood culture.  He does note that he still having continued pain to his right shoulder and he instantly threw up the Percocet that he was prescribed. He denies any trauma, falls, or injuries. Patient gets dialysis through a right chest wall dialysis catheter that he reports was placed months ago. He denies any pain, rashes, or drainage from his dialysis catheter site. He does note that he did not complete one of his recent full dialysis sessions  due to his right shoulder pain. He is next scheduled for dialysis on Tuesday.     He denies any fever, chills, body aches, sweats, chest pain, cough, shortness of breath, nausea or vomiting other than that 1 incident after Percocet, headaches, dizziness, vision changes, abdominal pain, diarrhea, or fatigue. No sick contacts or recent travels.               Patient History   Medical History[1]  Surgical History[2]  Family History[3]  Social History[4]    Physical Exam   ED Triage Vitals [05/25/25 1708]   Temperature Heart Rate Respirations BP   36.2 °C (97.2 °F) 65 18 133/66      Pulse Ox Temp src Heart Rate Source Patient Position   99 % -- Monitor Sitting      BP Location FiO2 (%)     Left arm --       Physical Exam  Vitals and nursing note reviewed.   Constitutional:       General: He is not in acute distress.     Appearance: He is not ill-appearing.   HENT:      Head: Normocephalic and atraumatic.      Right Ear: External ear normal.      Left Ear: External ear normal.      Nose: Nose normal.      Mouth/Throat:      Mouth: Mucous membranes are moist.   Eyes:      General: No scleral icterus.     Extraocular Movements: Extraocular movements intact.      Conjunctiva/sclera: Conjunctivae normal.      Pupils: Pupils are equal, round, and reactive to light.   Cardiovascular:      Rate and Rhythm: Normal rate and regular rhythm.      Heart sounds: Normal heart sounds.      Comments: Normal looking port without erythema, swelling, discharge  Pulmonary:      Effort: No accessory muscle usage or respiratory distress.      Breath sounds: Normal breath sounds. No wheezing, rhonchi or rales.   Abdominal:      General: Abdomen is flat. Bowel sounds are normal. There is no distension.      Palpations: Abdomen is soft.      Tenderness: There is no abdominal tenderness. There is no right CVA tenderness, left CVA tenderness or guarding.   Musculoskeletal:         General: No swelling or deformity. Normal range of motion.       Cervical back: Normal range of motion and neck supple. No rigidity.      Right lower leg: No edema.      Left lower leg: No edema.      Comments: Right shoulder tender to palpation though no swelling or erythema.     Skin:     General: Skin is warm and dry.      Capillary Refill: Capillary refill takes less than 2 seconds.      Comments: Right chest wall dialysis catheter site is clean/dry/intact.   Neurological:      General: No focal deficit present.      Mental Status: He is alert and oriented to person, place, and time.      GCS: GCS eye subscore is 4. GCS verbal subscore is 5. GCS motor subscore is 6.      Cranial Nerves: Cranial nerves 2-12 are intact. No cranial nerve deficit.      Sensory: No sensory deficit.      Motor: Motor function is intact. No weakness.   Psychiatric:         Mood and Affect: Mood and affect normal.         Speech: Speech normal.         Behavior: Behavior normal. Behavior is cooperative.           ED Course & MDM   ED Course as of 05/1955   Sun May 25, 2025   1913 CBC shows a normocytic anemia with a hemoglobin of 7.1 however is similar to his baseline. [YG]   1952 Patient does have a hyperkalemia 5.5.  Patient recently went to dialysis however did not complete his dialysis on Saturday.  Given the patient's need for dialysis along with the holiday, would recommend admission for dialysis in the setting of hyperkalemia. [YG]      ED Course User Index  [YG] Kristina Rodriguez MD         Diagnoses as of 05/1955   Anemia, unspecified type   Positive blood culture   Chronic right shoulder pain                 No data recorded     [place] Coma Scale Score: 15 (05/25/25 1709 : Gualberto Gee RN)                           Medical Decision Making  69-year-old male presenting today for positive blood culture. He did have 2 recent aspirations done of his right shoulder and preliminary results of the culture from that fluid is negative for any organisms. In the Emergency Department,  hospital records were reviewed and IV access was obtained. Patient was given a dose of IV dilaudid to treat his right shoulder pain. Patient is already following up with Orthopedics for this and patient reports that he is scheduled for outpatient MRI later this week. No indication for any emergent shoulder imaging today. Patient denies any trauma, falls, or injuries. He denies any changes to his right shoulder pain. Per review of EMR, the patient has 1 positive blood culture for Staphylococcus hominis though the other blood culture drawn on 5/20 is negative.  Confirmed with the patient's family member that both blood cultures from 5/20 were drawn peripherally and not through his dialysis port.  Patient denies any subjective chills or fevers.  He is afebrile on arrival, no vital signs meeting SIRS criteria. The patient is in no respiratory distress, satting well on room air.  Discussed with the patient that considering only 1 blood cultures positive and it is within known normal zaynab of the skin it is possibly a contamination though considering patient's medical history and the fact that he does not report and is unsure if it was drawn off of his port, cannot officially rule out a central line infection.  We did offer the patient admission for IV antibiotics until his blood cultures today result but the patient is currently declining admission, stating that he has weekend plans for the holiday and that he may come back another day. Repeat blood cultures x 2 were obtained today. Plan for patient to receive a one-time dose of IV vancomycin. CBC today shows white count of 4.8. Hemoglobin is low at 7.1, which is lower than patient's baseline per review of EMR. Patient's last hemoglobin on 5/20/25 was 7.6 but prior to that, his hemoglobin on 4/25/25 was 10.8. Patient denies any active bleeding anywhere. He denies any blood in his stools. He is hemodynamically stable at this time so no indication for blood transfusion at  this time but his hemoglobin will need to be closely monitored.    Patient's care signed out to ED resident pending CMP results, remainder of patient's ED course, and pending final dispo. Patient remains stable at this time.        Procedure  Procedures         Lily Torres PA-C  05/25/25 1853    This patient was seen by the advanced practice provider.  I have personally performed a substantive portion of the encounter.  I have seen and examined the patient; agree with the workup, evaluation, MDM, management and diagnosis.  The care plan has been discussed.      I personally saw the patient and made/approved the management plan and take responsibility for the patient management.    Rosie Aguilar MD        [1]   Past Medical History:  Diagnosis Date    Anemia     Arthritis     Cataract     Chronic kidney disease, stage 3 unspecified (Multi) 09/26/2018    Stage 3 chronic kidney disease    CKD (chronic kidney disease)     stage V    Cough 02/12/2024    COVID-19 06/18/2020    COVID-19 virus infection    Diabetes (Multi)     ESRD (end stage renal disease) (Multi)     Focal and segmental proliferative glomerulonephritis 12/23/2023    HTN (hypertension)     Hyperlipidemia     Other long term (current) drug therapy 07/20/2021    High risk medication use    Personal history of other diseases of the circulatory system     Personal history of cardiac murmur    Personal history of other infectious and parasitic diseases 08/17/2015    History of hepatitis    Polyp, colonic 08/17/2023    Primary osteoarthritis of both ankles 08/17/2023    Prostate cancer (Multi)     Tubular adenoma of colon 08/17/2023    Unspecified kidney failure 08/17/2016    Renal failure   [2]   Past Surgical History:  Procedure Laterality Date    ILEOSTOMY  04/25/2017    Ileostomy    ILEOSTOMY CLOSURE  08/17/2015    Ileostomy Closure    OTHER SURGICAL HISTORY  04/21/2017    Right Hemicolectomy    OTHER SURGICAL HISTORY  08/17/2015    Arteriovenous Surgery  Creation Of A-V Fistula    OTHER SURGICAL HISTORY  08/17/2015    Sigmoidoscopy (Fiberoptic, Therapeutic )    PROSTATECTOMY  10/11/2013    Prostatectomy Radical    TRANSPLANT, KIDNEY, OPEN  1992    TRANSPLANT, KIDNEY, OPEN  2013    US GUIDED PERCUTANEOUS BIOPSY RENAL LEFT Left 11/20/2023    US GUIDED PERCUTANEOUS BIOPSY RENAL LEFT 11/20/2023 Lou Rodgers [facility]    US GUIDED PERCUTANEOUS PERITONEAL OR RETROPERITONEAL FLUID COLLECTION DRAINAGE  10/20/2022    US GUIDED PERCUTANEOUS PERITONEAL OR RETROPERITONEAL FLUID COLLECTION DRAINAGE 10/20/2022 [facility]   [3]   Family History  Problem Relation Name Age of Onset    Bone cancer Mother      Other (corona's sarcome of the bone marrow) Mother      Prostate cancer Father      Diabetes Other Family Hist     Hypertension Other Family Hist    [4]   Social History  Tobacco Use    Smoking status: Never     Passive exposure: Past    Smokeless tobacco: Never   Vaping Use    Vaping status: Never Used   Substance Use Topics    Alcohol use: Never    Drug use: Not on file        Rosie Aguilar MD  05/25/25 2005

## 2025-05-25 NOTE — ED TRIAGE NOTES
R shoulder pain. Pt had a port placed R upper chest for Dialysis. In ED for increase in pain. Blood/BC was draw and return that he have a possible infection. In ED for antibiotics possible admission

## 2025-05-25 NOTE — PROGRESS NOTES
Patient family member called back.  She stated that she was told that he needed to return to the emergency department for a positive blood culture but they did not want to come back if there was no need to.  I did inform them that although it may be a contaminant we needed more information.  Particularly given his persistent pain as well as a significant past medical history it was recommended that he return to the emergency department.  They said that they would.

## 2025-05-26 ENCOUNTER — APPOINTMENT (OUTPATIENT)
Dept: DIALYSIS | Facility: HOSPITAL | Age: 70
End: 2025-05-26
Payer: COMMERCIAL

## 2025-05-26 LAB
ABO GROUP (TYPE) IN BLOOD: NORMAL
ALBUMIN SERPL BCP-MCNC: 2.8 G/DL (ref 3.4–5)
ALP SERPL-CCNC: 73 U/L (ref 33–136)
ALT SERPL W P-5'-P-CCNC: 16 U/L (ref 10–52)
ANION GAP SERPL CALC-SCNC: 15 MMOL/L (ref 10–20)
ANTIBODY SCREEN: NORMAL
AST SERPL W P-5'-P-CCNC: 16 U/L (ref 9–39)
BACTERIA BLD AEROBE CULT: ABNORMAL
BACTERIA BLD CULT: ABNORMAL
BILIRUB SERPL-MCNC: 0.4 MG/DL (ref 0–1.2)
BUN SERPL-MCNC: 26 MG/DL (ref 6–23)
CALCIUM SERPL-MCNC: 9.4 MG/DL (ref 8.6–10.6)
CHLORIDE SERPL-SCNC: 101 MMOL/L (ref 98–107)
CO2 SERPL-SCNC: 26 MMOL/L (ref 21–32)
CREAT SERPL-MCNC: 10.02 MG/DL (ref 0.5–1.3)
EGFRCR SERPLBLD CKD-EPI 2021: 5 ML/MIN/1.73M*2
ERYTHROCYTE [DISTWIDTH] IN BLOOD BY AUTOMATED COUNT: 14.3 % (ref 11.5–14.5)
GLUCOSE BLD MANUAL STRIP-MCNC: 103 MG/DL (ref 74–99)
GLUCOSE BLD MANUAL STRIP-MCNC: 142 MG/DL (ref 74–99)
GLUCOSE BLD MANUAL STRIP-MCNC: 78 MG/DL (ref 74–99)
GLUCOSE BLD MANUAL STRIP-MCNC: 79 MG/DL (ref 74–99)
GLUCOSE SERPL-MCNC: 58 MG/DL (ref 74–99)
GRAM STN SPEC: ABNORMAL
HCT VFR BLD AUTO: 24.3 % (ref 41–52)
HGB BLD-MCNC: 7.5 G/DL (ref 13.5–17.5)
MAGNESIUM SERPL-MCNC: 1.87 MG/DL (ref 1.6–2.4)
MCH RBC QN AUTO: 26.8 PG (ref 26–34)
MCHC RBC AUTO-ENTMCNC: 30.9 G/DL (ref 32–36)
MCV RBC AUTO: 87 FL (ref 80–100)
NRBC BLD-RTO: 0 /100 WBCS (ref 0–0)
PLATELET # BLD AUTO: 112 X10*3/UL (ref 150–450)
POTASSIUM SERPL-SCNC: 4.9 MMOL/L (ref 3.5–5.3)
PROT SERPL-MCNC: 6.3 G/DL (ref 6.4–8.2)
RBC # BLD AUTO: 2.8 X10*6/UL (ref 4.5–5.9)
RH FACTOR (ANTIGEN D): NORMAL
SODIUM SERPL-SCNC: 137 MMOL/L (ref 136–145)
TACROLIMUS BLD-MCNC: <2 NG/ML
TRANSFERRIN SERPL-MCNC: 85 MG/DL (ref 200–360)
WBC # BLD AUTO: 5.3 X10*3/UL (ref 4.4–11.3)

## 2025-05-26 PROCEDURE — 86850 RBC ANTIBODY SCREEN: CPT

## 2025-05-26 PROCEDURE — 82947 ASSAY GLUCOSE BLOOD QUANT: CPT

## 2025-05-26 PROCEDURE — 80053 COMPREHEN METABOLIC PANEL: CPT

## 2025-05-26 PROCEDURE — 86901 BLOOD TYPING SEROLOGIC RH(D): CPT

## 2025-05-26 PROCEDURE — 90935 HEMODIALYSIS ONE EVALUATION: CPT | Performed by: STUDENT IN AN ORGANIZED HEALTH CARE EDUCATION/TRAINING PROGRAM

## 2025-05-26 PROCEDURE — 2500000004 HC RX 250 GENERAL PHARMACY W/ HCPCS (ALT 636 FOR OP/ED): Mod: JW,TB

## 2025-05-26 PROCEDURE — 36415 COLL VENOUS BLD VENIPUNCTURE: CPT

## 2025-05-26 PROCEDURE — 83735 ASSAY OF MAGNESIUM: CPT

## 2025-05-26 PROCEDURE — 85027 COMPLETE CBC AUTOMATED: CPT

## 2025-05-26 PROCEDURE — 99223 1ST HOSP IP/OBS HIGH 75: CPT | Performed by: STUDENT IN AN ORGANIZED HEALTH CARE EDUCATION/TRAINING PROGRAM

## 2025-05-26 PROCEDURE — 80197 ASSAY OF TACROLIMUS: CPT

## 2025-05-26 PROCEDURE — 8010000001 HC DIALYSIS - HEMODIALYSIS PER DAY

## 2025-05-26 PROCEDURE — 99232 SBSQ HOSP IP/OBS MODERATE 35: CPT

## 2025-05-26 PROCEDURE — 2500000001 HC RX 250 WO HCPCS SELF ADMINISTERED DRUGS (ALT 637 FOR MEDICARE OP)

## 2025-05-26 PROCEDURE — 6350000001 HC RX 635 EPOETIN >10,000 UNITS: Mod: JZ,TB | Performed by: STUDENT IN AN ORGANIZED HEALTH CARE EDUCATION/TRAINING PROGRAM

## 2025-05-26 PROCEDURE — 5A1D70Z PERFORMANCE OF URINARY FILTRATION, INTERMITTENT, LESS THAN 6 HOURS PER DAY: ICD-10-PCS | Performed by: STUDENT IN AN ORGANIZED HEALTH CARE EDUCATION/TRAINING PROGRAM

## 2025-05-26 PROCEDURE — 84466 ASSAY OF TRANSFERRIN: CPT

## 2025-05-26 PROCEDURE — 2500000002 HC RX 250 W HCPCS SELF ADMINISTERED DRUGS (ALT 637 FOR MEDICARE OP, ALT 636 FOR OP/ED)

## 2025-05-26 PROCEDURE — 1100000001 HC PRIVATE ROOM DAILY

## 2025-05-26 RX ORDER — CALCIUM GLUCONATE 20 MG/ML
1 INJECTION, SOLUTION INTRAVENOUS ONCE
Status: DISCONTINUED | OUTPATIENT
Start: 2025-05-26 | End: 2025-05-26

## 2025-05-26 RX ORDER — NIFEDIPINE 90 MG/1
90 TABLET, EXTENDED RELEASE ORAL 2 TIMES DAILY
Status: DISCONTINUED | OUTPATIENT
Start: 2025-05-26 | End: 2025-05-28 | Stop reason: HOSPADM

## 2025-05-26 RX ORDER — HEPARIN SODIUM 5000 [USP'U]/ML
5000 INJECTION, SOLUTION INTRAVENOUS; SUBCUTANEOUS EVERY 8 HOURS
Status: DISCONTINUED | OUTPATIENT
Start: 2025-05-26 | End: 2025-05-28 | Stop reason: HOSPADM

## 2025-05-26 RX ORDER — PREDNISONE 5 MG/1
5 TABLET ORAL DAILY
Status: DISCONTINUED | OUTPATIENT
Start: 2025-05-26 | End: 2025-05-28 | Stop reason: HOSPADM

## 2025-05-26 RX ORDER — TACROLIMUS 0.5 MG/1
0.5 CAPSULE ORAL DAILY
Status: DISCONTINUED | OUTPATIENT
Start: 2025-05-27 | End: 2025-05-28 | Stop reason: HOSPADM

## 2025-05-26 RX ORDER — CALCIUM GLUCONATE 20 MG/ML
2 INJECTION, SOLUTION INTRAVENOUS ONCE
Status: COMPLETED | OUTPATIENT
Start: 2025-05-26 | End: 2025-05-26

## 2025-05-26 RX ORDER — TACROLIMUS 0.5 MG/1
0.5 CAPSULE ORAL 2 TIMES DAILY
Status: DISCONTINUED | OUTPATIENT
Start: 2025-05-26 | End: 2025-05-26

## 2025-05-26 RX ORDER — ISOSORBIDE MONONITRATE 30 MG/1
30 TABLET, EXTENDED RELEASE ORAL DAILY
Status: DISCONTINUED | OUTPATIENT
Start: 2025-05-26 | End: 2025-05-28 | Stop reason: HOSPADM

## 2025-05-26 RX ORDER — POLYETHYLENE GLYCOL 3350 17 G/17G
17 POWDER, FOR SOLUTION ORAL DAILY PRN
Status: DISCONTINUED | OUTPATIENT
Start: 2025-05-26 | End: 2025-05-28 | Stop reason: HOSPADM

## 2025-05-26 RX ORDER — PANTOPRAZOLE SODIUM 40 MG/1
40 TABLET, DELAYED RELEASE ORAL
Status: DISCONTINUED | OUTPATIENT
Start: 2025-05-26 | End: 2025-05-28 | Stop reason: HOSPADM

## 2025-05-26 RX ORDER — PRAVASTATIN SODIUM 20 MG/1
10 TABLET ORAL NIGHTLY
Status: DISCONTINUED | OUTPATIENT
Start: 2025-05-26 | End: 2025-05-28 | Stop reason: HOSPADM

## 2025-05-26 RX ADMIN — ASCORBIC ACID, THIAMINE MONONITRATE,RIBOFLAVIN, NIACINAMIDE, PYRIDOXINE HYDROCHLORIDE, FOLIC ACID, CYANOCOBALAMIN, BIOTIN, CALCIUM PANTOTHENATE, 1 CAPSULE: 100; 1.5; 1.7; 20; 10; 1; 6000; 150000; 5 CAPSULE, LIQUID FILLED ORAL at 08:55

## 2025-05-26 RX ADMIN — SODIUM ZIRCONIUM CYCLOSILICATE 5 G: 5 POWDER, FOR SUSPENSION ORAL at 08:55

## 2025-05-26 RX ADMIN — PRAVASTATIN SODIUM 10 MG: 20 TABLET ORAL at 21:40

## 2025-05-26 RX ADMIN — ISOSORBIDE MONONITRATE 30 MG: 30 TABLET, EXTENDED RELEASE ORAL at 13:49

## 2025-05-26 RX ADMIN — EPOETIN ALFA-EPBX 10000 UNITS: 10000 INJECTION, SOLUTION INTRAVENOUS; SUBCUTANEOUS at 13:50

## 2025-05-26 RX ADMIN — HEPARIN SODIUM 5000 UNITS: 5000 INJECTION, SOLUTION INTRAVENOUS; SUBCUTANEOUS at 13:50

## 2025-05-26 RX ADMIN — SODIUM ZIRCONIUM CYCLOSILICATE 5 G: 5 POWDER, FOR SUSPENSION ORAL at 17:16

## 2025-05-26 RX ADMIN — CALCIUM GLUCONATE 2 G: 20 INJECTION, SOLUTION INTRAVENOUS at 09:11

## 2025-05-26 RX ADMIN — INSULIN LISPRO 1 UNITS: 100 INJECTION, SOLUTION INTRAVENOUS; SUBCUTANEOUS at 12:00

## 2025-05-26 RX ADMIN — PREDNISONE 5 MG: 5 TABLET ORAL at 08:55

## 2025-05-26 RX ADMIN — NIFEDIPINE 90 MG: 90 TABLET, FILM COATED, EXTENDED RELEASE ORAL at 21:40

## 2025-05-26 RX ADMIN — HEPARIN SODIUM 5000 UNITS: 5000 INJECTION, SOLUTION INTRAVENOUS; SUBCUTANEOUS at 05:26

## 2025-05-26 RX ADMIN — HYDROMORPHONE HYDROCHLORIDE 0.4 MG: 1 INJECTION, SOLUTION INTRAMUSCULAR; INTRAVENOUS; SUBCUTANEOUS at 05:33

## 2025-05-26 RX ADMIN — TACROLIMUS 0.5 MG: 0.5 CAPSULE ORAL at 09:00

## 2025-05-26 RX ADMIN — INSULIN GLARGINE 15 UNITS: 100 INJECTION, SOLUTION SUBCUTANEOUS at 09:17

## 2025-05-26 RX ADMIN — NIFEDIPINE 90 MG: 90 TABLET, FILM COATED, EXTENDED RELEASE ORAL at 08:55

## 2025-05-26 RX ADMIN — CARVEDILOL 37.5 MG: 25 TABLET, FILM COATED ORAL at 08:55

## 2025-05-26 RX ADMIN — HYDROMORPHONE HYDROCHLORIDE 0.4 MG: 1 INJECTION, SOLUTION INTRAMUSCULAR; INTRAVENOUS; SUBCUTANEOUS at 14:22

## 2025-05-26 RX ADMIN — CARVEDILOL 37.5 MG: 25 TABLET, FILM COATED ORAL at 21:40

## 2025-05-26 RX ADMIN — PANTOPRAZOLE SODIUM 40 MG: 40 TABLET, DELAYED RELEASE ORAL at 06:01

## 2025-05-26 RX ADMIN — HYDROMORPHONE HYDROCHLORIDE 0.4 MG: 1 INJECTION, SOLUTION INTRAMUSCULAR; INTRAVENOUS; SUBCUTANEOUS at 21:47

## 2025-05-26 SDOH — ECONOMIC STABILITY: FOOD INSECURITY: HOW HARD IS IT FOR YOU TO PAY FOR THE VERY BASICS LIKE FOOD, HOUSING, MEDICAL CARE, AND HEATING?: NOT VERY HARD

## 2025-05-26 SDOH — ECONOMIC STABILITY: HOUSING INSECURITY: IN THE LAST 12 MONTHS, WAS THERE A TIME WHEN YOU WERE NOT ABLE TO PAY THE MORTGAGE OR RENT ON TIME?: NO

## 2025-05-26 SDOH — ECONOMIC STABILITY: FOOD INSECURITY: WITHIN THE PAST 12 MONTHS, YOU WORRIED THAT YOUR FOOD WOULD RUN OUT BEFORE YOU GOT THE MONEY TO BUY MORE.: NEVER TRUE

## 2025-05-26 SDOH — ECONOMIC STABILITY: FOOD INSECURITY: WITHIN THE PAST 12 MONTHS, THE FOOD YOU BOUGHT JUST DIDN'T LAST AND YOU DIDN'T HAVE MONEY TO GET MORE.: NEVER TRUE

## 2025-05-26 SDOH — ECONOMIC STABILITY: HOUSING INSECURITY: IN THE PAST 12 MONTHS, HOW MANY TIMES HAVE YOU MOVED WHERE YOU WERE LIVING?: 0

## 2025-05-26 SDOH — ECONOMIC STABILITY: HOUSING INSECURITY: AT ANY TIME IN THE PAST 12 MONTHS, WERE YOU HOMELESS OR LIVING IN A SHELTER (INCLUDING NOW)?: NO

## 2025-05-26 SDOH — ECONOMIC STABILITY: TRANSPORTATION INSECURITY: IN THE PAST 12 MONTHS, HAS LACK OF TRANSPORTATION KEPT YOU FROM MEDICAL APPOINTMENTS OR FROM GETTING MEDICATIONS?: NO

## 2025-05-26 ASSESSMENT — COGNITIVE AND FUNCTIONAL STATUS - GENERAL
MOBILITY SCORE: 24
DAILY ACTIVITIY SCORE: 24

## 2025-05-26 ASSESSMENT — PAIN SCALES - GENERAL
PAINLEVEL_OUTOF10: 0 - NO PAIN
PAINLEVEL_OUTOF10: 6
PAINLEVEL_OUTOF10: 10 - WORST POSSIBLE PAIN
PAINLEVEL_OUTOF10: 9
PAINLEVEL_OUTOF10: 4

## 2025-05-26 ASSESSMENT — PAIN DESCRIPTION - LOCATION: LOCATION: SHOULDER

## 2025-05-26 ASSESSMENT — ACTIVITIES OF DAILY LIVING (ADL)
LACK_OF_TRANSPORTATION: NO
LACK_OF_TRANSPORTATION: NO

## 2025-05-26 ASSESSMENT — PAIN - FUNCTIONAL ASSESSMENT
PAIN_FUNCTIONAL_ASSESSMENT: 0-10

## 2025-05-26 ASSESSMENT — PAIN DESCRIPTION - DESCRIPTORS
DESCRIPTORS: ACHING;DISCOMFORT
DESCRIPTORS: DISCOMFORT

## 2025-05-26 ASSESSMENT — PAIN DESCRIPTION - ORIENTATION: ORIENTATION: RIGHT

## 2025-05-26 NOTE — H&P
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 Currently, only complains of R shoulder pain previously noted during last presentation, not acutely worsened. Denies any fevers, chills, nausea/vomiting, shortness of breath.     Patient recently presented to ED on 5/20 with complaints of R shoulder pain, at that time evaluated by orthopedics. R shoulder was tapped with bloody fluid and 2.4k WBC, 88% PMNs, no crystals, and cultures eventually resulting negative. CT imaging of the R shoulder demonstrated pathologic fracture of the R coracoid process, with potential etiologies as secondary to brown tumor in setting of ESRD vs. Longstanding hemarthrosis, or neoplasm, and additionally, acute impaction fracture of the humeral head. Patient was discharged with plan for MRI shoulder as outpatient. While at this ED visit, blood cultures x2 were obtained, 1 of which eventually growing staph hominis and the other negative. For this, patient was called to return to the ED. In ED, was given IV vancomycin. On labs performed today, patient found to have mild hyperkalemia to 5.5. Thus, decision was made to admit the patient given the holiday weekend and concern of delays to his next dialysis session per ED. He does report however he completed his full dialysis sessio yesterday.    Patient has had multiple admissions over past year requiring MICU admission for urgent dialysis needs. Notable recent history includes admission/treatment for atypical pneumonia for which ID recommended 6 week course of  augmentin and fungal coverage with isavuconazole for 3 months (ending in June) however patient only took 2 weeks of this but did follow up with ID outpatient with Dr. Preston, who is aware. Most recently was admitted 4/15 for hypertensive emergency with associated encephalopathy and pulmonary edema, requiring antihypertensive drips and dialysis.    ED Course:  Vitals: afebrile, HR 65, RR 18, /66, 99% on RA  Labs:   - RFP: glucose 234, Na 137, K 5.5, HCO3 28, BUN 25, Cr 8.76  - Iron panel: Iron 25, TIBC 113, %sat 22  - CBC: WBC 4.8, Hgb 7.1, Plt 120   - Blood cx x2  Interventions: Dilaudid 0.5, IV vancomycin     Past Medical History  He has a past medical history of Anemia, Arthritis, Cataract, Chronic kidney disease, stage 3 unspecified (Multi) (09/26/2018), CKD (chronic kidney disease), Cough (02/12/2024), COVID-19 (06/18/2020), Diabetes (Multi), ESRD (end stage renal disease) (Multi), Focal and segmental proliferative glomerulonephritis (12/23/2023), HTN (hypertension), Hyperlipidemia, Other long term (current) drug therapy (07/20/2021), Personal history of other diseases of the circulatory system, Personal history of other infectious and parasitic diseases (08/17/2015), Polyp, colonic (08/17/2023), Primary osteoarthritis of both ankles (08/17/2023), Prostate cancer (Multi), Tubular adenoma of colon (08/17/2023), and Unspecified kidney failure (08/17/2016).    Surgical History  He has a past surgical history that includes Prostatectomy (10/11/2013); Ileostomy (04/25/2017); Other surgical history (04/21/2017); Ileostomy closure (08/17/2015); Other surgical history (08/17/2015); Other surgical history (08/17/2015); US guided percutaneous peritoneal or retroperitoneal fluid collection drainage (10/20/2022); transplant, kidney, open (1992); transplant, kidney, open (2013); and US guided percutaneous biopsy renal left (Left, 11/20/2023).     Social History  He reports that he has never smoked. He has been exposed  to tobacco smoke. He has never used smokeless tobacco.  Drug: Oxycodone. He reports that he does not drink alcohol.    Family History  Family History[1]     Allergies  Patient has no known allergies.    Review of Systems   Review of systems was performed and is otherwise negative except as noted in HPI.     Physical Exam  General: awake, alert, conversant, appears stated age  HEENT: pupils equal and round, no scleral icterus  Skin: no suspect lesions or rashes noted on visible skin  Chest: ctab, normal respiratory effort, not on supplemental oxygen. R chest tunneled dialysis line present.  Cardiac: regular rate, normal s1, s2, no M/R/G  Abdomen: soft, ND, NT, no involuntary guarding. Midline surgical scar present.  : no flank pain or indwelling urinary catheter  EXT: no peripheral edema, LUE fistula with palpable thrill  MSK: no focal joint swelling noted  Neuro: AOx4, moving all limbs spontaneously, follows commands  Psych: coherent thought process, appropriate mood and affect     Last Recorded Vitals  /66 (BP Location: Left arm, Patient Position: Sitting)   Pulse 65   Temp 36.2 °C (97.2 °F)   Resp 18   Wt 63.5 kg (140 lb)   SpO2 99%     Relevant Results  Results from last 7 days   Lab Units 05/25/25  1805 05/20/25  2218   WBC AUTO x10*3/uL 4.8 4.1*   HEMOGLOBIN g/dL 7.1* 7.6*   HEMATOCRIT % 21.9* 23.1*   PLATELETS AUTO x10*3/uL 120* 109*     Results from last 7 days   Lab Units 05/25/25  1805 05/20/25  2218   SODIUM mmol/L 137 130*   POTASSIUM mmol/L 5.5* 5.6*   CHLORIDE mmol/L 102 96*   CO2 mmol/L 28 26   BUN mg/dL 25* 31*   CREATININE mg/dL 8.76* 7.19*   CALCIUM mg/dL 9.6 10.0   PROTEIN TOTAL g/dL 6.7 7.4   BILIRUBIN TOTAL mg/dL 0.4 0.5   ALK PHOS U/L 80 108   ALT U/L 16 24   AST U/L 13 19   GLUCOSE mg/dL 234* 376*     Assessment & Plan  Anemia, unspecified type    69-year-old male with pmhx of ESRD s/p renal transplant x2, non-functioning (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    #Hyperkalemia  #ESRD s/p renal transplant x2, non-functioning  :: K of 5.5 on presentation to ED  :: Last session dialysis on Saturday which he reported he completed fully  Plan:  - No emergent needs for dialysis currently  - Obtain ECG  - Give calcium gluconate x1  - Lokelma q8h, repeat labs in AM and evaluate if needs to be continued  - Consult transplant nephrology  - Continue home immunosuppressive regiment: tacro 0.5 BID, prednisone 5mg daily  - Obtain AM tacro level     #RUE fracture of coracoid process, humeral head  #R Shoulder pain  :: Fractures evident from CT findings 5/21/25  :: Evaluated by ortho previously, underwent joint aspiration, without evidence of septic arthritis  :: Joint aspiration results 5/20: bloody, 2.4k WBC, 88% neutrophil,   Plan:  - Planned for MRI shoulder for further characterization on Wednesday, consider inpatient if still here and covered by insurance  - Pain regiment: Tylenol for mild pain, IV dilaudid 0.2 moderate/0.4 severe or breakthrough pain    #HTN  - Continue home regiment: Carvedilol 37.5mg BID, Imdur 30mt daily  Nifedipine 90mg BID    #Anemia  :: Hb 7.1 on presentation (b/l 9-10)  :: Iron panel 5/25 obtained by ED: Iron 25, TIBC 113, %sat 22  - Likely related to CKD however potential component of iron deficiency. Obtain ferritin, transferrin in addition to other labs    #Positive blood cx  :: Blood cx 5/20 at last ED presentation: 1 of 2 positive for staph hominis, other with no growth  - Likely contaminant, no evidence of current infectious process or symptoms  - Stop antibiotics and monitor  - Follow up repeat blood cx by ED    #T2DM  ::  Home regiment: Glarine 20u AM + SSI  - 15u glargine AM while inpatient, SSI w/ meals    F: Caution with ESRD  E: replete PRN  N: Renal, carb controlled diet  A: pIV, R chest tunneled dialysis line    DVT ppx: Heparin subcutaneous  GI ppx: home PPI    Code status: Full (Confirmed)  Surrogate: Partner Amalia ([phone])       Jim Elliott MD  PGY-2 Internal Medicine         [1]   Family History  Problem Relation Name Age of Onset    Bone cancer Mother      Other (corona's sarcome of the bone marrow) Mother      Prostate cancer Father      Diabetes Other Family Hist     Hypertension Other Family Hist

## 2025-05-26 NOTE — PROGRESS NOTES
05/26/25 1417   Discharge Planning   Living Arrangements Children   Support Systems Children   Assistance Needed none   Type of Residence Private residence   Number of Stairs to Enter Residence 7   Number of Stairs Within Residence 13   Do you have animals or pets at home? No   Who is requesting discharge planning? Other (Comment)  (TCC assessment)   Home or Post Acute Services None   Expected Discharge Disposition Home   Does the patient need discharge transport arranged? No   RoundTrip coordination needed? No   Has discharge transport been arranged? No   Financial Resource Strain   How hard is it for you to pay for the very basics like food, housing, medical care, and heating? Not very   Housing Stability   In the last 12 months, was there a time when you were not able to pay the mortgage or rent on time? N   In the past 12 months, how many times have you moved where you were living? 0   At any time in the past 12 months, were you homeless or living in a shelter (including now)? N   Transportation Needs   In the past 12 months, has lack of transportation kept you from medical appointments or from getting medications? no   In the past 12 months, has lack of transportation kept you from meetings, work, or from getting things needed for daily living? No   Intensity of Service   Intensity of Service 0-30 min     Transitional Care Coordinator Note: Met with patient to discuss discharge planning s/p admission.  Patient lives home with daughter( Purnima) as listed. Independent in all ADL's. Requires no assist devices for ambulation.  Patient denies active home care or home care needs.  Demographics and contact information confirmed.  Will continue to monitor patient for all home going needs.  Filomena Velázquez RN TCC via Epic.    Falls- none  Equipment -none  HC -none  Picc/Port- none  SW- patient with concerns for bills per CDC, email to [facility] to assist   O2/Cpap- none  Diabetes- yes, working glucometer- uses insulin    Dialysis- yes, CDC shaker T,Th,Sat family transport   PCP- Millicent Hutton  last seen a month ago   Pharm- [facility]  Transport at discharge- family   Therapy Recommendations- pending PT/OT

## 2025-05-26 NOTE — NURSING NOTE
Report from Sending RN:    Report From: floor nurse  Recent Surgery of Procedure: No  Baseline Level of Consciousness (LOC): A&OX4  Oxygen Use: No  Type: pulse ox 97% on room air  Diabetic: Yes, blood sugar 78  Last BP Med Given Day of Dialysis: 1349 - imdur 30 mg po, 0855 - coreg 37.5 mg po, nifedipine 90 mg po  Last Pain Med Given: 1422 - dilaudid 0.4 mg IV  Lab Tests to be Obtained with Dialysis: No  Blood Transfusion to be Given During Dialysis: No  Available IV Access: Yes  Medications to be Administered During Dialysis: No  Continuous IV Infusion Running: No  Restraints on Currently or in the Last 24 Hours: No  Hand-Off Communication: Full Code, admitted for bacteremia, hx ESRD, HTN, DM, /90  Dialysis Catheter Dressing: N/A  Last Dressing Change: N/A

## 2025-05-26 NOTE — PROGRESS NOTES
Pharmacy Medication History Review    Manolo Bashir is a 69 y.o. male admitted for Anemia, unspecified type. Pharmacy reviewed the patient's phjfz-ce-dvplusufo medications and allergies for accuracy.    Medications ADDED:  None  Medications CHANGED:  None  Medications REMOVED:   None    The list below reflects the updated PTA list.   Prior to Admission Medications   Prescriptions Last Dose Informant   Easy Touch Alcohol Prep Pads pads, medicated  Self   NIFEdipine ER (Adalat CC) 90 mg 24 hr tablet  Self   Sig: Take 1 tablet (90 mg) by mouth 2 times a day. Do not crush, chew, or split.   blood sugar diagnostic (True Metrix Glucose Test Strip)  Self   Sig: For BG check 4x/day   carvedilol (Coreg) 12.5 mg tablet  Self   Sig: Take 3 tablets (37.5 mg) by mouth 2 times a day. Hold for systolic BP <140 and HR <60. PATIENT NEEDS TO FOLLOW UP WITH CARDIOLOGY   cinacalcet (Sensipar) 30 mg tablet  Friend   Sig: Take 1 tablet (30 mg) by mouth once daily.   clotrimazole (Lotrimin) 1 % cream  Self   Sig: Apply topically 2 times a day.   fluocinonide (Lidex) 0.05 % ointment  Self   Sig: Apply to affected areas twice daily when active as needed. Use less than 14 days per month.   Patient not taking: Reported on 2025   glucagon (Gvoke HypoPen 1-Pack) 1 mg/0.2 mL auto-injector  Self   Sig: Inject 1 mg into the muscle every 15 minutes if needed (For blood glucose 41 to 70 mg/dL and no IV access).  PT reports taking, but has no recent fill history.   imiquimod (Aldara) 5 % cream  Self   Sig: Apply 1 packet topically 3 times a week.   Patient not taking: Reported on 2025   insulin glargine (Lantus) 100 unit/mL (3 mL) pen  Self   Si units daily   insulin lispro (HumaLOG) 100 unit/mL pen  Self   Si units with lunch and dinner plus a sliding scale up to 20 units daily   isavuconazonium sulfate (Cresemba) 186 mg capsule capsule  Self   Sig: Take 2 capsules (372 mg) by mouth once daily. Take 2 capsules 3/6 at 10pm and 2  "capsules 3/7 6am, then starting 3/8 take 2 capsules once a day   Patient not taking: Reported on 2025   isosorbide mononitrate ER (Imdur) 30 mg 24 hr tablet  Self   Sig: Take 1 tablet (30 mg) by mouth once daily. Do not crush or chew.   lancets 33 gauge misc  Self   Si each 3 times a day.   lancing device misc  Self   Sig: use as directed   metoclopramide (Reglan) 5 mg tablet  Self   Sig: Take 1 tablet (5 mg) by mouth 3 times a day before meals.   naloxone (Narcan) 4 mg/0.1 mL nasal spray  Self   Sig: Administer 1 spray (4 mg) into affected nostril(s) if needed for opioid reversal. May repeat every 2-3 minutes if needed, alternating nostrils, until medical assistance becomes available.   oxyCODONE-acetaminophen (Percocet) 5-325 mg tablet  Self   Sig: Take 1 tablet by mouth every 6 hours if needed for severe pain (7 - 10) for up to 7 days.   pantoprazole (ProtoNix) 40 mg EC tablet  Friend   Sig: Take 1 tablet (40 mg) by mouth once daily in the morning. Take before meals. Do not crush, chew, or split. Do not start before 2024.   pen needle, diabetic (TechLITE Pen Needle) 32 gauge x \" needle  Self   Sig: Use 4 a day as directed   pravastatin (Pravachol) 10 mg tablet  Self   Sig: Take 1 tablet (10 mg) by mouth once daily at bedtime.   predniSONE (Deltasone) 5 mg tablet  Self   Sig: Take 1 tablet (5 mg) by mouth once daily.   sevelamer carbonate (Renvela) 800 mg tablet  Self   Sig: Take 1 tablet (800 mg) by mouth 3 times a day before meals. Swallow tablet whole; do not crush, break, or chew.  PT reports taking, but has no recent fill history.   sodium chloride (Ocean) 0.65 % nasal spray  Self   Sig: Administer 1 spray into each nostril 4 times a day as needed for congestion.   syringe with needle 3 mL 25 x 5/8\" syringe  Self   Sig: Use to inject epogen.   Patient not taking: Reported on 2025   tacrolimus (Prograf) 0.5 mg capsule  Self   Sig: Take 1 capsule (0.5 mg) by mouth 2 times a day. " "  tacrolimus (Protopic) 0.1 % ointment  Self   Sig: Apply topically 2 times a day.   Patient not taking: Reported on 2/28/2025   vitamin B complex-vitamin C-folic acid (Nephrocaps) 1 mg capsule  Self   Sig: Take 1 capsule by mouth once daily.      Facility-Administered Medications: None        The list below reflects the updated allergy list. Please review each documented allergy for additional clarification and justification.  Allergies  Reviewed by Urban López on 5/26/2025   No Known Allergies         Patient declines M2B at discharge.     Sources:   -Patient interview, Good historian  -Outpatient pharmacy dispense history  -[facility]  -Care everywhere  -Chart review    Office visit 5/22/25 by Rosie Aguilar MD    Additional Comments:  None      URBAN LÓPEZ  Pharmacy Technician  05/26/25     Secure Chat preferred   If no response call [phone] or [employer] \"Med Rec\"    "

## 2025-05-26 NOTE — ASSESSMENT & PLAN NOTE
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    #Hyperkalemia  #ESRD s/p renal transplant x2, non-functioning  :: K of 5.5 on presentation to ED  :: Last session dialysis on Saturday which he reported he completed fully  Plan:  - No emergent needs for dialysis currently  - Obtain ECG  - Give calcium gluconate x1  - Lokelma q8h, repeat labs in AM and evaluate if needs to be continued  - Consult transplant nephrology  - Continue home immunosuppressive regiment: tacro 0.5 BID, prednisone 5mg daily  - Obtain AM tacro level     #RUE fracture of coracoid process, humeral head  #R Shoulder pain  :: Fractures evident from CT findings 5/21/25  :: Evaluated by ortho previously, underwent joint aspiration, without evidence of septic arthritis  :: Joint aspiration results 5/20: bloody, 2.4k WBC, 88% neutrophil,   Plan:  - Planned for MRI shoulder for further characterization on Wednesday, consider inpatient if still here and covered by insurance  - Pain regiment: Tylenol for mild pain, IV dilaudid 0.2 moderate/0.4 severe or breakthrough pain    #HTN  - Continue home regiment: Carvedilol 37.5mg BID, Imdur 30mt daily  Nifedipine 90mg BID    #Anemia  :: Hb 7.1 on presentation (b/l 9-10)  :: Iron panel 5/25 obtained by ED: Iron 25, TIBC 113, %sat 22  - Likely related to CKD however potential component of iron deficiency. Obtain ferritin, transferrin in addition to other labs    #Positive blood cx  :: Blood cx 5/20 at last ED presentation: 1 of 2 positive for staph hominis, other with no growth  - Likely contaminant, no  evidence of current infectious process or symptoms  - Stop antibiotics and monitor  - Follow up repeat blood cx by ED    #T2DM  :: Home regiment: Glarine 20u AM + SSI  - 15u glargine AM while inpatient, SSI w/ meals    F: Caution with ESRD  E: replete PRN  N: Renal, carb controlled diet  A: pIV, R chest tunneled dialysis line    DVT ppx: Heparin subcutaneous  GI ppx: home PPI    Code status: Full (Confirmed)  Surrogate: Partner Amalia ([phone])

## 2025-05-26 NOTE — CARE PLAN
The patient's goals for the shift include      The clinical goals for the shift include no falls or injuries while up walking/get dialysis    Over the shift, the patient did make progress toward the following goals.     Problem: Safety - Adult  Goal: Free from fall injury  Outcome: Progressing     Problem: Chronic Conditions and Co-morbidities  Goal: Patient's chronic conditions and co-morbidity symptoms are monitored and maintained or improved  Outcome: Progressing

## 2025-05-26 NOTE — ASSESSMENT & PLAN NOTE
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      Updates 5/26  -Nephrology transplant consulted, appreciate recs   - Morning labs still pending, will CTM potassium closely   - Will reach out to ID regarding savuconazole and atypical pneumonia   - F/up repeat culture           #Hyperkalemia  #ESRD s/p renal transplant x2, non-functioning  :: K of 5.5 on presentation to ED  :: Last session dialysis on Saturday which he reported he completed fully  Plan:  - No emergent needs for dialysis currently  - Obtain ECG  - Give calcium gluconate x1  - Lokelma q8h, repeat labs in AM and evaluate if needs to be continued  - Consult transplant nephrology  - Continue home immunosuppressive regiment: tacro 0.5 BID, prednisone 5mg daily  - Obtain AM tacro level       #RUE fracture of coracoid process, humeral head  #R Shoulder pain  :: Fractures evident from CT findings 5/21/25  :: Evaluated by ortho previously, underwent joint aspiration, without evidence of septic arthritis  :: Joint aspiration results 5/20: bloody, 2.4k WBC, 88% neutrophil,   Plan:  - Planned for MRI shoulder for further characterization on Wednesday, consider inpatient if still here and covered by insurance  - Pain regiment: Tylenol for mild pain, IV dilaudid 0.2 moderate/0.4 severe or breakthrough pain    #HTN  - Continue home regiment: Carvedilol 37.5mg BID, Imdur 30mt daily  Nifedipine 90mg BID    #Anemia  :: Hb 7.1 on presentation (b/l 9-10)  :: Iron panel 5/25 obtained by ED: Iron 25, TIBC 113, %sat 22  - Likely related to CKD however potential  component of iron deficiency. Obtain ferritin, transferrin in addition to other labs    #Positive blood cx  :: Blood cx 5/20 at last ED presentation: 1 of 2 positive for staph hominis, other with no growth  - Likely contaminant, no evidence of current infectious process or symptoms  - Stop antibiotics and monitor  - Follow up repeat blood cx by ED    #T2DM  :: Home regiment: Glarine 20u AM + SSI  - 15u glargine AM while inpatient, SSI w/ meals    F: Caution with ESRD  E: replete PRN  N: Renal, carb controlled diet  A: pIV, R chest tunneled dialysis line    DVT ppx: Heparin subcutaneous  GI ppx: home PPI    Code status: Full (Confirmed)  Surrogate: Partner Amalia ([phone])

## 2025-05-26 NOTE — PROGRESS NOTES
Manolo Bashir is a 69 y.o. male on day 1 of admission presenting with Anemia, unspecified type.      Subjective   Pt seen this morning, reports that he has chronic right shoulder pain, otherwise no active complaints, had BM yesterday  No acute events overnight        Objective     Last Recorded Vitals  /77 (BP Location: Left arm, Patient Position: Lying)   Pulse 74   Temp 37.1 °C (98.8 °F) (Temporal)   Resp 16   Wt 65 kg (143 lb 4.8 oz)   SpO2 99%   Intake/Output last 3 Shifts:    Intake/Output Summary (Last 24 hours) at 5/26/2025 0949  Last data filed at 5/26/2025 0922  Gross per 24 hour   Intake 560 ml   Output --   Net 560 ml       Admission Weight  Weight: 63.5 kg (140 lb) (05/25/25 1708)    Daily Weight  05/25/25 : 65 kg (143 lb 4.8 oz)    Image Results  ECG 12 lead  Normal sinus rhythm  Right axis deviation  Nonspecific intraventricular block  T wave abnormality, consider inferior ischemia  Abnormal ECG  When compared with ECG of 21-MAY-2025 00:23,  Questionable change in QRS axis  Minimal criteria for Anterior infarct are no longer Present  T wave inversion now evident in Inferior leads  T wave inversion now evident in Lateral leads    See ED provider note for full interpretation and clinical correlation  Confirmed by Sil Clemons ([id number]) on 5/25/2025 11:10:28 PM      Physical Exam  General: awake, alert, conversant, appears stated age  HEENT: pupils equal and round, no scleral icterus  Skin: no suspect lesions or rashes noted on visible skin  Chest: ctab, normal respiratory effort, not on supplemental oxygen. R chest tunneled dialysis line present.  Cardiac: regular rate, normal s1, s2, no M/R/G  Abdomen: soft, ND, NT, no involuntary guarding. Midline surgical scar present.  : no flank pain or indwelling urinary catheter  EXT: no peripheral edema, LUE fistula with palpable thrill  MSK: no focal joint swelling noted  Neuro: AOx4, moving all limbs spontaneously, follows commands  Psych:  coherent thought process, appropriate mood and affect              Results for orders placed or performed during the hospital encounter of 05/25/25 (from the past 24 hours)   CBC and Auto Differential   Result Value Ref Range    WBC 4.8 4.4 - 11.3 x10*3/uL    nRBC 0.0 0.0 - 0.0 /100 WBCs    RBC 2.66 (L) 4.50 - 5.90 x10*6/uL    Hemoglobin 7.1 (L) 13.5 - 17.5 g/dL    Hematocrit 21.9 (L) 41.0 - 52.0 %    MCV 82 80 - 100 fL    MCH 26.7 26.0 - 34.0 pg    MCHC 32.4 32.0 - 36.0 g/dL    RDW 14.0 11.5 - 14.5 %    Platelets 120 (L) 150 - 450 x10*3/uL    Neutrophils % 74.6 40.0 - 80.0 %    Immature Granulocytes %, Automated 0.4 0.0 - 0.9 %    Lymphocytes % 12.3 13.0 - 44.0 %    Monocytes % 10.0 2.0 - 10.0 %    Eosinophils % 2.3 0.0 - 6.0 %    Basophils % 0.4 0.0 - 2.0 %    Neutrophils Absolute 3.57 1.20 - 7.70 x10*3/uL    Immature Granulocytes Absolute, Automated 0.02 0.00 - 0.70 x10*3/uL    Lymphocytes Absolute 0.59 (L) 1.20 - 4.80 x10*3/uL    Monocytes Absolute 0.48 0.10 - 1.00 x10*3/uL    Eosinophils Absolute 0.11 0.00 - 0.70 x10*3/uL    Basophils Absolute 0.02 0.00 - 0.10 x10*3/uL   Comprehensive metabolic panel   Result Value Ref Range    Glucose 234 (H) 74 - 99 mg/dL    Sodium 137 136 - 145 mmol/L    Potassium 5.5 (H) 3.5 - 5.3 mmol/L    Chloride 102 98 - 107 mmol/L    Bicarbonate 28 21 - 32 mmol/L    Anion Gap 13 10 - 20 mmol/L    Urea Nitrogen 25 (H) 6 - 23 mg/dL    Creatinine 8.76 (H) 0.50 - 1.30 mg/dL    eGFR 6 (L) >60 mL/min/1.73m*2    Calcium 9.6 8.6 - 10.6 mg/dL    Albumin 3.0 (L) 3.4 - 5.0 g/dL    Alkaline Phosphatase 80 33 - 136 U/L    Total Protein 6.7 6.4 - 8.2 g/dL    AST 13 9 - 39 U/L    Bilirubin, Total 0.4 0.0 - 1.2 mg/dL    ALT 16 10 - 52 U/L   Blood Culture    Specimen: Mediport; Blood culture   Result Value Ref Range    Blood Culture Loaded on Instrument - Culture in progress    Iron Binding Capacity Profile   Result Value Ref Range    Iron 25 (L) 35 - 150 ug/dL    UIBC 88 (L) 110 - 370 ug/dL    TIBC  113 (L) 240 - 445 ug/dL    % Saturation 22 (L) 25 - 45 %   Ferritin   Result Value Ref Range    Ferritin 1,563 (H) 20 - 300 ng/mL   Blood Culture    Specimen: Peripheral Venipuncture; Blood culture   Result Value Ref Range    Blood Culture Loaded on Instrument - Culture in progress    ECG 12 lead   Result Value Ref Range    Ventricular Rate 65 BPM    Atrial Rate 65 BPM    AZ Interval 182 ms    QRS Duration 142 ms    QT Interval 438 ms    QTC Calculation(Bazett) 455 ms    P Axis 61 degrees    R Axis 121 degrees    T Axis -15 degrees    QRS Count 11 beats    Q Onset 216 ms    P Onset 125 ms    P Offset 166 ms    T Offset 435 ms    QTC Fredericia 449 ms   POCT GLUCOSE   Result Value Ref Range    POCT Glucose 181 (H) 74 - 99 mg/dL   Transferrin   Result Value Ref Range    Transferrin 85 (L) 200 - 360 mg/dL   Magnesium   Result Value Ref Range    Magnesium 1.87 1.60 - 2.40 mg/dL   POCT GLUCOSE   Result Value Ref Range    POCT Glucose 103 (H) 74 - 99 mg/dL     *Note: Due to a large number of results and/or encounters for the requested time period, some results have not been displayed. A complete set of results can be found in Results Review.      ECG 12 lead  Result Date: 5/25/2025  Normal sinus rhythm Right axis deviation Nonspecific intraventricular block T wave abnormality, consider inferior ischemia Abnormal ECG When compared with ECG of 21-MAY-2025 00:23, Questionable change in QRS axis Minimal criteria for Anterior infarct are no longer Present T wave inversion now evident in Inferior leads T wave inversion now evident in Lateral leads See ED provider note for full interpretation and clinical correlation Confirmed by Sil Clemons ([id number]) on 5/25/2025 11:10:28 PM    CT shoulder right wo IV contrast  Result Date: 5/21/2025  Interpreted By:  Pipe Cunningham and Sheng Max STUDY: CT SHOULDER RIGHT WO IV CONTRAST;  5/21/2025 7:02 am   INDICATION: Signs/Symptoms:Eval for shoulder dislocation.     COMPARISON: XR SHOULDER  RIGHT 2+ VIEWS 5/20/2025   ACCESSION NUMBER(S): [id number]   ORDERING CLINICIAN: CHERIE FANG   TECHNIQUE: CT imaging of the  right shoulder was obtained. Coronal and sagittal reformatted images were performed.   FINDINGS: OSSEOUS STRUCTURES:   Acute oblique comminuted fracture through the right coracoid process (Series 202, Image 37) (Series 208, Image 65). There is lucency at the base of the coracoid process.   Acute impaction fracture involving the anteromedial humeral head (Series 202, Image 54).   Mild osteoarthrosis of the acromioclavicular joint. Os acromiale. Chondrocalcinosis. There is scalloping of the acromial undersurface.   SOFT TISSUES:   Moderate right glenohumeral hemarthrosis. No retained radiopaque foreign bodies.   Partially imaged right internal jugular approach central venous catheter tip terminates within the right atrium. There are scattered airspace opacities throughout the partially imaged right upper and right lower lobes which may be sequela of recent pneumonitis. Mosaic attenuation throughout the right lung is likely sequela of small-vessel/airway disease. Moderate right pleural effusion.   Soft tissue edema about the shoulder.       1. Acute oblique comminuted fracture through the right coracoid process. This is favored to be a pathologic fracture given lucency at the base of the coracoid. This may reflecting sequela of brown tumor in the setting of end-stage renal disease, scalloping from longstanding hemarthrosis, or neoplasm. MRI would be of value for further characterization. 2. Findings favoring acute impaction fracture involving the anteromedial humeral head. 3. Moderate right glenohumeral hemarthrosis. Soft tissue edema about the shoulder. 4. Pneumonitis involving the right upper and right lower lobes. 5. Moderate right pleural effusion.   I personally reviewed the images/study and I agree with the findings as stated by Dr. Mitchell Da Silva. This study was interpreted at [facility]  [place], [place].   MACRO: None   Signed by: Pipe Cunningham 5/21/2025 7:53 AM Dictation workstation:   [id number]    XR chest 1 view  Result Date: 5/21/2025  Interpreted By:  Talon Frazier and Sheng Max STUDY: XR CHEST 1 VIEW;  5/21/2025 12:33 am   INDICATION: Signs/Symptoms:preop.   COMPARISON: CT ABDOMEN PELVIS WO IV CONTRAST 4/14/2025, XR CHEST 1 VIEW 4/14/2025   ACCESSION NUMBER(S): [id number]   ORDERING CLINICIAN: SHA VANG   FINDINGS: AP radiograph of the chest:   LINES AND DEVICES: Right internal jugular approach central venous catheter tip projects over the cavoatrial junction. Left axillary stent is noted with similar kinking in the mid axillary stent.   CARDIOMEDIASTINAL SILHOUETTE: Stable enlargement of the cardiomediastinal silhouette.   LUNGS: Improved bilateral perihilar congestion and interstitial prominence. No focal consolidation, pleural effusion or pneumothorax.   ABDOMEN: No remarkable upper abdominal findings.   BONES: No acute osseous abnormality.       1. Improved bilateral perihilar congestion and pulmonary edema. Otherwise no acute cardiopulmonary process. 2. Medical devices as detailed above.   I personally reviewed the images/study and I agree with the findings as stated by Dr. Mitchell Da Silva. This study was interpreted at [place], [place].   MACRO: None   Signed by: Talon Frazier 5/21/2025 1:21 AM Dictation workstation:   [id number]    ECG 12 Lead  Result Date: 5/21/2025  Normal sinus rhythm Right superior axis deviation Nonspecific intraventricular block Minimal voltage criteria for LVH, may be normal variant ( [place] product ) Cannot rule out Anterior infarct (cited on or before 14-APR-2025) Abnormal ECG When compared with ECG of 15-APR-2025 10:41, QRS axis Shifted left T wave inversion no longer evident in Inferior leads T wave inversion no longer evident in Anterior leads See ED provider note  for full interpretation and clinical correlation Confirmed by Faustina Workman ([id number]) on 5/21/2025 12:30:06 AM    XR shoulder right 2+ views  Result Date: 5/20/2025  Interpreted By:  Talon Frazier and Nakamoto Kent STUDY: XR SHOULDER RIGHT 2+ VIEWS; ;  5/20/2025 10:34 pm   INDICATION: Signs/Symptoms:Swollen right shoulder joint, unable to move without significant pain.   COMPARISON: XR SHOULDER RIGHT 2+ VIEWS 5/1/2025   ACCESSION NUMBER(S): [id number]   ORDERING CLINICIAN: GELA HASSAN   FINDINGS: Three views of the right shoulder were provided.   No acute fracture or dislocation.   No radiopaque foreign body or soft tissue gas.   Similar 0.7 cm sclerotic lesion within the proximal humerus that is stable dating back to chest radiograph 06/01/2009 and likely represents benign etiology.   Diffuse demineralization is noted of the osseous structures. Mild degenerative changes of the acromioclavicular joint and glenohumeral joint.   Prominent appearance of the soft tissues about the shoulder.   Right IJ CVC with tip terminating over the expected position of the distal SVC. Visualized lungs are clear without evidence of pneumothorax, pleural effusion, or focal consolidation.       No acute osseous abnormalities. Prominent appearance of the soft tissues about the shoulder may represent soft tissue swelling or large joint effusion. Correlate with physical exam and if clinical concern persists, consider further evaluation with MRI.   I personally reviewed the images/study and I agree with the findings as stated by Kendrick Purdy MD. This study was interpreted at [facility], [place], [place].   MACRO: None   Signed by: Talon Frazier 5/20/2025 11:34 PM Dictation workstation:   [id number]    XR shoulder right 2+ views  Result Date: 5/2/2025  Interpreted By:  Evie Benavides, STUDY: XR SHOULDER RIGHT 2+ VIEWS;  5/1/2025 11:31 am   INDICATION: Signs/Symptoms:right shoulder pain.   COMPARISON:  None.   ACCESSION NUMBER(S): [id number]   ORDERING CLINICIAN: KIRAN WHITMAN   FINDINGS: Infusaport is seen.   Osteopenia is present. Hypertrophic bony changes are seen at the acromioclavicular joint, which narrow the joint. The glenohumeral joint is well preserved. Cystic changes and spurs are seen off of the humeral head. There is no evidence of acute fracture or dislocation.   No lytic or blastic lesions are seen.       Degenerative changes.       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      Updates 5/26  -Nephrology transplant consulted, appreciate recs   - Morning labs still pending, will CTM potassium closely   - Will reach out to ID regarding savuconazole and atypical pneumonia   - F/up repeat culture           #Hyperkalemia  #ESRD s/p renal transplant x2, non-functioning  :: K of 5.5 on presentation to ED  :: Last session dialysis on Saturday which he reported he completed fully  Plan:  - No emergent needs for dialysis currently  - Obtain ECG  - Give calcium gluconate x1  - Lokelma q8h, repeat labs in AM and evaluate if needs to be continued  - Consult transplant nephrology  - Continue home immunosuppressive regiment: tacro 0.5 BID, prednisone 5mg daily  - Obtain AM tacro level       #RUE fracture of coracoid process, humeral head  #R Shoulder pain  :: Fractures evident from CT findings 5/21/25  :: Evaluated by ortho previously, underwent joint  aspiration, without evidence of septic arthritis  :: Joint aspiration results 5/20: bloody, 2.4k WBC, 88% neutrophil,   Plan:  - Planned for MRI shoulder for further characterization on Wednesday, consider inpatient if still here and covered by insurance  - Pain regiment: Tylenol for mild pain, IV dilaudid 0.2 moderate/0.4 severe or breakthrough pain    #HTN  - Continue home regiment: Carvedilol 37.5mg BID, Imdur 30mt daily  Nifedipine 90mg BID    #Anemia  :: Hb 7.1 on presentation (b/l 9-10)  :: Iron panel 5/25 obtained by ED: Iron 25, TIBC 113, %sat 22  - Likely related to CKD however potential component of iron deficiency. Obtain ferritin, transferrin in addition to other labs    #Positive blood cx  :: Blood cx 5/20 at last ED presentation: 1 of 2 positive for staph hominis, other with no growth  - Likely contaminant, no evidence of current infectious process or symptoms  - Stop antibiotics and monitor  - Follow up repeat blood cx by ED    #T2DM  :: Home regiment: Glarine 20u AM + SSI  - 15u glargine AM while inpatient, SSI w/ meals    F: Caution with ESRD  E: replete PRN  N: Renal, carb controlled diet  A: pIV, R chest tunneled dialysis line    DVT ppx: Heparin subcutaneous  GI ppx: home PPI    Code status: Full (Confirmed)  Surrogate: Partner Amalia ([phone])         Pt seen and discussed with Dr Rusty Moran MD  PGY neurology

## 2025-05-26 NOTE — CONSULTS
Reason For Consult  Dilaysis, failed allograft    History Of Present Illness  Manolo Bashir is a 69 y.o. male presenting with strep hominis bacteremia and right shoulder pain. He has a history of ESRD s/p renal transplant x2, non-functioning (initially 1992 c/b allograft failure in 2006, 2nd transplant 2013 c/b impaired allograft function), currently on iHD since 05/2024 (Tues/Thurs/Sat), MGUS, T2DM, HTN, prostate cx s/p radical prostatectomy, HCV s/p SVR (treated w/Harvoni), recent C Diff (treated w/ PO vancomycin 02/2025) initially presenting to the ED with positive bloo d culture as noted above.    From dialysis standpoint he has no major complaints other than his right shoulder pain.      Past Medical History  He has a past medical history of Anemia, Arthritis, Cataract, Chronic kidney disease, stage 3 unspecified (Multi) (09/26/2018), CKD (chronic kidney disease), Cough (02/12/2024), COVID-19 (06/18/2020), Diabetes (Multi), ESRD (end stage renal disease) (Multi), Focal and segmental proliferative glomerulonephritis (12/23/2023), HTN (hypertension), Hyperlipidemia, Other long term (current) drug therapy (07/20/2021), Personal history of other diseases of the circulatory system, Personal history of other infectious and parasitic diseases (08/17/2015), Polyp, colonic (08/17/2023), Primary osteoarthritis of both ankles (08/17/2023), Prostate cancer (Multi), Tubular adenoma of colon (08/17/2023), and Unspecified kidney failure (08/17/2016).    Surgical History  He has a past surgical history that includes Prostatectomy (10/11/2013); Ileostomy (04/25/2017); Other surgical history (04/21/2017); Ileostomy closure (08/17/2015); Other surgical history (08/17/2015); Other surgical history (08/17/2015); US guided percutaneous peritoneal or retroperitoneal fluid collection drainage (10/20/2022); transplant, kidney, open (1992); transplant, kidney, open (2013); and US guided percutaneous biopsy renal left (Left,  "11/20/2023).     Social History  He reports that he has never smoked. He has been exposed to tobacco smoke. He has never used smokeless tobacco.  Drug: Oxycodone. He reports that he does not drink alcohol.    Family History  Family History[1]     Allergies  Patient has no known allergies.    Review of Systems  Review of  14 systems was performed system by system. See HPI. Otherwise, the symptoms were negative.     Physical Exam  Vital signs - reviewed.   General Appearance - NAD, Good speech, oriented and alert  HEENT - Supple. Not pale. No jaundice.   CVS - RRR. Normal S1/S2. No murmur, click , rub or gallop  Lungs- clear to auscultation bilaterally  Abdomen - soft , not tender, no guarding, no rigidity. No hepatosplenomegaly. Normal bowel sounds. No masses and ascites. S/P Kidney transplant .  Transplanted kidney is not tender.   Musculoskeletal /Extremities - no edema. Full ROM. No joint tenderness.   Neuro/Psych - appropriate mood and affect. Motor power V/V all extremities. CN I -XII were grossly intact.  Skin - No visible rash  Access - LUE AVF       Last Recorded Vitals  Blood pressure (!) 187/90, pulse 74, temperature 36.6 °C (97.9 °F), resp. rate 18, height 1.727 m (5' 7.99\"), weight 65 kg (143 lb 4.8 oz), SpO2 97%.    Relevant Results  Results for orders placed or performed during the hospital encounter of 05/25/25 (from the past 24 hours)   CBC and Auto Differential   Result Value Ref Range    WBC 4.8 4.4 - 11.3 x10*3/uL    nRBC 0.0 0.0 - 0.0 /100 WBCs    RBC 2.66 (L) 4.50 - 5.90 x10*6/uL    Hemoglobin 7.1 (L) 13.5 - 17.5 g/dL    Hematocrit 21.9 (L) 41.0 - 52.0 %    MCV 82 80 - 100 fL    MCH 26.7 26.0 - 34.0 pg    MCHC 32.4 32.0 - 36.0 g/dL    RDW 14.0 11.5 - 14.5 %    Platelets 120 (L) 150 - 450 x10*3/uL    Neutrophils % 74.6 40.0 - 80.0 %    Immature Granulocytes %, Automated 0.4 0.0 - 0.9 %    Lymphocytes % 12.3 13.0 - 44.0 %    Monocytes % 10.0 2.0 - 10.0 %    Eosinophils % 2.3 0.0 - 6.0 %    " Basophils % 0.4 0.0 - 2.0 %    Neutrophils Absolute 3.57 1.20 - 7.70 x10*3/uL    Immature Granulocytes Absolute, Automated 0.02 0.00 - 0.70 x10*3/uL    Lymphocytes Absolute 0.59 (L) 1.20 - 4.80 x10*3/uL    Monocytes Absolute 0.48 0.10 - 1.00 x10*3/uL    Eosinophils Absolute 0.11 0.00 - 0.70 x10*3/uL    Basophils Absolute 0.02 0.00 - 0.10 x10*3/uL   Comprehensive metabolic panel   Result Value Ref Range    Glucose 234 (H) 74 - 99 mg/dL    Sodium 137 136 - 145 mmol/L    Potassium 5.5 (H) 3.5 - 5.3 mmol/L    Chloride 102 98 - 107 mmol/L    Bicarbonate 28 21 - 32 mmol/L    Anion Gap 13 10 - 20 mmol/L    Urea Nitrogen 25 (H) 6 - 23 mg/dL    Creatinine 8.76 (H) 0.50 - 1.30 mg/dL    eGFR 6 (L) >60 mL/min/1.73m*2    Calcium 9.6 8.6 - 10.6 mg/dL    Albumin 3.0 (L) 3.4 - 5.0 g/dL    Alkaline Phosphatase 80 33 - 136 U/L    Total Protein 6.7 6.4 - 8.2 g/dL    AST 13 9 - 39 U/L    Bilirubin, Total 0.4 0.0 - 1.2 mg/dL    ALT 16 10 - 52 U/L   Blood Culture    Specimen: Mediport; Blood culture   Result Value Ref Range    Blood Culture Loaded on Instrument - Culture in progress    Iron Binding Capacity Profile   Result Value Ref Range    Iron 25 (L) 35 - 150 ug/dL    UIBC 88 (L) 110 - 370 ug/dL    TIBC 113 (L) 240 - 445 ug/dL    % Saturation 22 (L) 25 - 45 %   Ferritin   Result Value Ref Range    Ferritin 1,563 (H) 20 - 300 ng/mL   Blood Culture    Specimen: Peripheral Venipuncture; Blood culture   Result Value Ref Range    Blood Culture Loaded on Instrument - Culture in progress    ECG 12 lead   Result Value Ref Range    Ventricular Rate 65 BPM    Atrial Rate 65 BPM    OK Interval 182 ms    QRS Duration 142 ms    QT Interval 438 ms    QTC Calculation(Bazett) 455 ms    P Axis 61 degrees    R Axis 121 degrees    T Axis -15 degrees    QRS Count 11 beats    Q Onset 216 ms    P Onset 125 ms    P Offset 166 ms    T Offset 435 ms    QTC Fredericia 449 ms   POCT GLUCOSE   Result Value Ref Range    POCT Glucose 181 (H) 74 - 99 mg/dL    Tacrolimus level   Result Value Ref Range    Tacrolimus  <2.0 <=15.0 ng/mL   Transferrin   Result Value Ref Range    Transferrin 85 (L) 200 - 360 mg/dL   Magnesium   Result Value Ref Range    Magnesium 1.87 1.60 - 2.40 mg/dL   Comprehensive metabolic panel   Result Value Ref Range    Glucose 58 (L) 74 - 99 mg/dL    Sodium 137 136 - 145 mmol/L    Potassium 4.9 3.5 - 5.3 mmol/L    Chloride 101 98 - 107 mmol/L    Bicarbonate 26 21 - 32 mmol/L    Anion Gap 15 10 - 20 mmol/L    Urea Nitrogen 26 (H) 6 - 23 mg/dL    Creatinine 10.02 (H) 0.50 - 1.30 mg/dL    eGFR 5 (L) >60 mL/min/1.73m*2    Calcium 9.4 8.6 - 10.6 mg/dL    Albumin 2.8 (L) 3.4 - 5.0 g/dL    Alkaline Phosphatase 73 33 - 136 U/L    Total Protein 6.3 (L) 6.4 - 8.2 g/dL    AST 16 9 - 39 U/L    Bilirubin, Total 0.4 0.0 - 1.2 mg/dL    ALT 16 10 - 52 U/L   POCT GLUCOSE   Result Value Ref Range    POCT Glucose 103 (H) 74 - 99 mg/dL     *Note: Due to a large number of results and/or encounters for the requested time period, some results have not been displayed. A complete set of results can be found in Results Review.          Assessment/Plan     Manolo Bashir is a 69 y.o. male presenting with strep hominis bacteremia and right shoulder pain. He has a history of ESRD s/p renal transplant x2, non-functioning (initially 1992 c/b allograft failure in 2006, 2nd transplant 2013 c/b impaired allograft function), currently on iHD since 05/2024 (Tues/Thurs/Sat)    ESKD  - HD today for hyperkalemia yesterday.  - plan to switch back to HD TTS if possible  - Renal vitamins    Immunosuppression  In the setting of active infection  - decrease TAC from 0.5 mg BID to 0.5 mg/day  No need to follow FK level  - continue pred 5 mg/day    CKD-MBD  Ca/Phos ok    BP - above goal  Expect improvement with UF  On coreg 37.5 mg BID  Nifedipine 90 mg BID  Imdur 30 mg/day    Infection  On empiric vancomycin  Repeat BC 5/25 pending    Heme  Hold IV iron during active infection  DARIANA    I spent  45 minutes in the professional and overall care of this patient.      Betty Ireland MD         [1]   Family History  Problem Relation Name Age of Onset    Bone cancer Mother      Other (corona's sarcome of the bone marrow) Mother      Prostate cancer Father      Diabetes Other Family Hist     Hypertension Other Family Hist

## 2025-05-27 LAB
ALBUMIN SERPL BCP-MCNC: 2.8 G/DL (ref 3.4–5)
ALP SERPL-CCNC: 73 U/L (ref 33–136)
ALT SERPL W P-5'-P-CCNC: 13 U/L (ref 10–52)
ANION GAP SERPL CALC-SCNC: 11 MMOL/L (ref 10–20)
AST SERPL W P-5'-P-CCNC: 13 U/L (ref 9–39)
BASOPHILS # BLD AUTO: 0.02 X10*3/UL (ref 0–0.1)
BASOPHILS NFR BLD AUTO: 0.4 %
BILIRUB SERPL-MCNC: 0.3 MG/DL (ref 0–1.2)
BUN SERPL-MCNC: 17 MG/DL (ref 6–23)
CALCIUM SERPL-MCNC: 9 MG/DL (ref 8.6–10.6)
CHLORIDE SERPL-SCNC: 97 MMOL/L (ref 98–107)
CO2 SERPL-SCNC: 33 MMOL/L (ref 21–32)
CREAT SERPL-MCNC: 6.62 MG/DL (ref 0.5–1.3)
EGFRCR SERPLBLD CKD-EPI 2021: 8 ML/MIN/1.73M*2
EOSINOPHIL # BLD AUTO: 0.21 X10*3/UL (ref 0–0.7)
EOSINOPHIL NFR BLD AUTO: 4.5 %
ERYTHROCYTE [DISTWIDTH] IN BLOOD BY AUTOMATED COUNT: 14.4 % (ref 11.5–14.5)
GLUCOSE BLD MANUAL STRIP-MCNC: 154 MG/DL (ref 74–99)
GLUCOSE BLD MANUAL STRIP-MCNC: 207 MG/DL (ref 74–99)
GLUCOSE SERPL-MCNC: 158 MG/DL (ref 74–99)
HCT VFR BLD AUTO: 24 % (ref 41–52)
HGB BLD-MCNC: 7.4 G/DL (ref 13.5–17.5)
IMM GRANULOCYTES # BLD AUTO: 0.03 X10*3/UL (ref 0–0.7)
IMM GRANULOCYTES NFR BLD AUTO: 0.6 % (ref 0–0.9)
LYMPHOCYTES # BLD AUTO: 0.74 X10*3/UL (ref 1.2–4.8)
LYMPHOCYTES NFR BLD AUTO: 15.8 %
MCH RBC QN AUTO: 26.5 PG (ref 26–34)
MCHC RBC AUTO-ENTMCNC: 30.8 G/DL (ref 32–36)
MCV RBC AUTO: 86 FL (ref 80–100)
MONOCYTES # BLD AUTO: 0.6 X10*3/UL (ref 0.1–1)
MONOCYTES NFR BLD AUTO: 12.8 %
NEUTROPHILS # BLD AUTO: 3.08 X10*3/UL (ref 1.2–7.7)
NEUTROPHILS NFR BLD AUTO: 65.9 %
NRBC BLD-RTO: 0 /100 WBCS (ref 0–0)
PLATELET # BLD AUTO: 111 X10*3/UL (ref 150–450)
POTASSIUM SERPL-SCNC: 4.1 MMOL/L (ref 3.5–5.3)
PROT SERPL-MCNC: 6.5 G/DL (ref 6.4–8.2)
RBC # BLD AUTO: 2.79 X10*6/UL (ref 4.5–5.9)
SODIUM SERPL-SCNC: 137 MMOL/L (ref 136–145)
WBC # BLD AUTO: 4.7 X10*3/UL (ref 4.4–11.3)

## 2025-05-27 PROCEDURE — 82947 ASSAY GLUCOSE BLOOD QUANT: CPT

## 2025-05-27 PROCEDURE — 2500000004 HC RX 250 GENERAL PHARMACY W/ HCPCS (ALT 636 FOR OP/ED): Mod: JW,TB

## 2025-05-27 PROCEDURE — 87075 CULTR BACTERIA EXCEPT BLOOD: CPT

## 2025-05-27 PROCEDURE — 97161 PT EVAL LOW COMPLEX 20 MIN: CPT | Mod: GP

## 2025-05-27 PROCEDURE — 97165 OT EVAL LOW COMPLEX 30 MIN: CPT | Mod: GO

## 2025-05-27 PROCEDURE — 85025 COMPLETE CBC W/AUTO DIFF WBC: CPT

## 2025-05-27 PROCEDURE — 2500000004 HC RX 250 GENERAL PHARMACY W/ HCPCS (ALT 636 FOR OP/ED): Performed by: STUDENT IN AN ORGANIZED HEALTH CARE EDUCATION/TRAINING PROGRAM

## 2025-05-27 PROCEDURE — 2500000001 HC RX 250 WO HCPCS SELF ADMINISTERED DRUGS (ALT 637 FOR MEDICARE OP)

## 2025-05-27 PROCEDURE — 1100000001 HC PRIVATE ROOM DAILY

## 2025-05-27 PROCEDURE — 2500000002 HC RX 250 W HCPCS SELF ADMINISTERED DRUGS (ALT 637 FOR MEDICARE OP, ALT 636 FOR OP/ED)

## 2025-05-27 PROCEDURE — 99233 SBSQ HOSP IP/OBS HIGH 50: CPT | Performed by: STUDENT IN AN ORGANIZED HEALTH CARE EDUCATION/TRAINING PROGRAM

## 2025-05-27 PROCEDURE — 36415 COLL VENOUS BLD VENIPUNCTURE: CPT

## 2025-05-27 PROCEDURE — 99232 SBSQ HOSP IP/OBS MODERATE 35: CPT

## 2025-05-27 PROCEDURE — 99222 1ST HOSP IP/OBS MODERATE 55: CPT | Performed by: INTERNAL MEDICINE

## 2025-05-27 PROCEDURE — 80053 COMPREHEN METABOLIC PANEL: CPT

## 2025-05-27 RX ADMIN — NIFEDIPINE 90 MG: 90 TABLET, FILM COATED, EXTENDED RELEASE ORAL at 09:15

## 2025-05-27 RX ADMIN — NIFEDIPINE 90 MG: 90 TABLET, FILM COATED, EXTENDED RELEASE ORAL at 20:57

## 2025-05-27 RX ADMIN — CARVEDILOL 37.5 MG: 25 TABLET, FILM COATED ORAL at 09:15

## 2025-05-27 RX ADMIN — HYDROMORPHONE HYDROCHLORIDE 0.4 MG: 1 INJECTION, SOLUTION INTRAMUSCULAR; INTRAVENOUS; SUBCUTANEOUS at 09:19

## 2025-05-27 RX ADMIN — INSULIN GLARGINE 15 UNITS: 100 INJECTION, SOLUTION SUBCUTANEOUS at 09:20

## 2025-05-27 RX ADMIN — CARVEDILOL 37.5 MG: 25 TABLET, FILM COATED ORAL at 20:57

## 2025-05-27 RX ADMIN — TACROLIMUS 0.5 MG: 0.5 CAPSULE ORAL at 09:15

## 2025-05-27 RX ADMIN — HYDROMORPHONE HYDROCHLORIDE 0.4 MG: 1 INJECTION, SOLUTION INTRAMUSCULAR; INTRAVENOUS; SUBCUTANEOUS at 06:07

## 2025-05-27 RX ADMIN — PRAVASTATIN SODIUM 10 MG: 20 TABLET ORAL at 20:57

## 2025-05-27 RX ADMIN — INSULIN LISPRO 2 UNITS: 100 INJECTION, SOLUTION INTRAVENOUS; SUBCUTANEOUS at 18:45

## 2025-05-27 RX ADMIN — ISOSORBIDE MONONITRATE 30 MG: 30 TABLET, EXTENDED RELEASE ORAL at 09:15

## 2025-05-27 RX ADMIN — HYDROMORPHONE HYDROCHLORIDE 0.4 MG: 1 INJECTION, SOLUTION INTRAMUSCULAR; INTRAVENOUS; SUBCUTANEOUS at 01:39

## 2025-05-27 RX ADMIN — PANTOPRAZOLE SODIUM 40 MG: 40 TABLET, DELAYED RELEASE ORAL at 06:07

## 2025-05-27 RX ADMIN — SODIUM ZIRCONIUM CYCLOSILICATE 5 G: 5 POWDER, FOR SUSPENSION ORAL at 06:07

## 2025-05-27 RX ADMIN — ASCORBIC ACID, THIAMINE MONONITRATE,RIBOFLAVIN, NIACINAMIDE, PYRIDOXINE HYDROCHLORIDE, FOLIC ACID, CYANOCOBALAMIN, BIOTIN, CALCIUM PANTOTHENATE, 1 CAPSULE: 100; 1.5; 1.7; 20; 10; 1; 6000; 150000; 5 CAPSULE, LIQUID FILLED ORAL at 09:15

## 2025-05-27 RX ADMIN — PREDNISONE 5 MG: 5 TABLET ORAL at 09:15

## 2025-05-27 ASSESSMENT — COGNITIVE AND FUNCTIONAL STATUS - GENERAL
MOVING FROM LYING ON BACK TO SITTING ON SIDE OF FLAT BED WITH BEDRAILS: A LITTLE
TURNING FROM BACK TO SIDE WHILE IN FLAT BAD: A LITTLE
MOVING TO AND FROM BED TO CHAIR: A LITTLE
DAILY ACTIVITIY SCORE: 24
STANDING UP FROM CHAIR USING ARMS: A LITTLE
HELP NEEDED FOR BATHING: A LITTLE
PERSONAL GROOMING: A LITTLE
TOILETING: A LITTLE
EATING MEALS: A LITTLE
MOBILITY SCORE: 23
MOBILITY SCORE: 18
DAILY ACTIVITIY SCORE: 18
CLIMB 3 TO 5 STEPS WITH RAILING: A LITTLE
WALKING IN HOSPITAL ROOM: A LITTLE
CLIMB 3 TO 5 STEPS WITH RAILING: A LITTLE
DRESSING REGULAR UPPER BODY CLOTHING: A LITTLE
DRESSING REGULAR LOWER BODY CLOTHING: A LITTLE

## 2025-05-27 ASSESSMENT — PAIN SCALES - GENERAL
PAINLEVEL_OUTOF10: 5 - MODERATE PAIN
PAINLEVEL_OUTOF10: 0 - NO PAIN
PAINLEVEL_OUTOF10: 10 - WORST POSSIBLE PAIN
PAINLEVEL_OUTOF10: 8
PAINLEVEL_OUTOF10: 9
PAINLEVEL_OUTOF10: 8
PAINLEVEL_OUTOF10: 8

## 2025-05-27 ASSESSMENT — PAIN DESCRIPTION - ORIENTATION
ORIENTATION: RIGHT
ORIENTATION: RIGHT

## 2025-05-27 ASSESSMENT — PAIN DESCRIPTION - LOCATION
LOCATION: SHOULDER
LOCATION: SHOULDER

## 2025-05-27 ASSESSMENT — PAIN - FUNCTIONAL ASSESSMENT
PAIN_FUNCTIONAL_ASSESSMENT: 0-10

## 2025-05-27 ASSESSMENT — ACTIVITIES OF DAILY LIVING (ADL): BATHING_ASSISTANCE: INDEPENDENT

## 2025-05-27 NOTE — PROGRESS NOTES
Physical Therapy    Physical Therapy    Physical Therapy Evaluation    Patient Name: Manolo Bashir  MRN: [id number]  Today's Date: 5/27/2025   Time Calculation  Start Time: 0902  Stop Time: 0910  Time Calculation (min): 8 min  2007/2007-A    Assessment/Plan   PT Assessment  Barriers to Discharge: none noted  Barriers to Discharge Home: No anticipated barriers  Evaluation/Treatment Tolerance: Patient tolerated treatment well  Strengths: Ability to acquire knowledge, Attitude of self  Assessment Comment: patient up indep in room. Educated on not using right arm for pushing/pulling at this time. Encouraged to continue with mobility ad lisa in room. No further PT needs  End of Session Patient Position: Bed, 2 rail up, Alarm off, not on at start of session  IP OR SWING BED PT PLAN  Inpatient or Swing Bed: Inpatient  PT Plan  PT Plan: PT Eval only  PT Eval Only Reason: No acute PT needs identified  PT Frequency: PT eval only  PT Discharge Recommendations:  (no PT anticipated at DC, pending MRI results, may need out patient PT pending ortho recommendations)  PT Recommended Transfer Status: Independent  PT - OK to Discharge: Yes    Subjective     Current Problem:  1. Anemia, unspecified type        2. Positive blood culture        3. Chronic right shoulder pain          Problem List[1]    General Visit Information:  General  Reason for Referral: impaired mobility  Past Medical History Relevant to Rehab: HTN, anemia, ESRD (s/p renal transplant x2 failure), dialysis, prostate CA, left arm non functioning fisula, anemia, DM, HTN  Family/Caregiver Present: No  Co-Treatment: OT  Co-Treatment Reason: facilitate safe functional mobility  Prior to Session Communication: Bedside nurse  Patient Position Received: Bed, 2 rail up, Alarm off, not on at start of session  General Comment: patient admitted on 5/25 due to strep hominis bacteremia and right shoulder pain. Bacteremia possible contaminant. Patient with swollen right shoulder  after dialysis access. Patient seen ortho and aspiration done without indication of infection. CT scan of right shoulder demonstrated fracture of coracoid process with possible brown tumor. MRI pending to further assess.    Home Living:  Home Living  Home Living Comments: lives with his daughters in 2 story home. 7 steps to enter, full flight to upstairs. Has a cane, walker, shower chair available but does not use. Bed and bath upstairs.    Prior Level of Function:  Prior Function Per Pt/Caregiver Report  Prior Function Comments: patient reports his daughters work but they are available to assist as needed. They help with all IADLs. Patient indep with ADLS. Patient is able to drive. Does not use a device at home    Precautions:  Precautions  Medical Precautions: Fall precautions  Precautions Comment: Recommend patient maintain NWB of UE until final report from MRI and further recommendations from orthopedics    Vital Signs:     Objective     Pain:  Pain Assessment  Pain Assessment: 0-10  0-10 (Numeric) Pain Score: 8  Pain Type: Acute pain  Pain Location: Shoulder  Pain Orientation: Right    Cognition:  Cognition  Orientation Level: Oriented X4    General Assessments:  General Observation  General Observation: pateint cooperative with session   Activity Tolerance  Endurance: Endurance does not limit participation in activity           Coordination  Movements are Fluid and Coordinated: Yes  Postural Control  Postural Control: Within Functional Limits  Dynamic Sitting Balance  Dynamic Sitting-Comments: good sittting balance at edge of bed  Dynamic Standing Balance  Dynamic Standing-Comments: good standing balance without device    Functional Assessments:     Bed Mobility  Bed Mobility: Yes  Bed Mobility 1  Bed Mobility 1: Supine to sitting  Level of Assistance 1: Independent  Bed Mobility Comments 1: head of bed slightly elevated  Transfers  Transfer: Yes  Transfer 1  Technique 1: Sit to stand, Stand to sit  Transfer  Level of Assistance 1: Independent  Trials/Comments 1: sit to stand without device, cues to not push up with right arm.  Ambulation/Gait Training  Ambulation/Gait Training Performed: Yes  Ambulation/Gait Training 1  Surface 1: Level tile  Device 1: No device  Assistance 1: Independent  Comments/Distance (ft) 1: patient ambulated 100' without device, no loss of balance, good step length          Extremity/Trunk Assessments:  RUE   RUE : Within Functional Limits  LUE   LUE: Within Functional Limits  RLE   RLE : Within Functional Limits  LLE   LLE : Within Functional Limits    Outcome Measures:     [facility] Basic Mobility  Turning from your back to your side while in a flat bed without using bedrails: None  Moving from lying on your back to sitting on the side of a flat bed without using bedrails: None  Moving to and from bed to chair (including a wheelchair): None  Standing up from a chair using your arms (e.g. wheelchair or bedside chair): None  To walk in hospital room: None  Climbing 3-5 steps with railing: A little  Basic Mobility - Total Score: 23                                                             Goals:  Encounter Problems       Encounter Problems (Active)       Pain - Adult            Education Documentation  Mobility Training, taught by Lily Stephens PT at 5/27/2025 10:48 AM.  Learner: Patient  Readiness: Acceptance  Method: Explanation  Response: Verbalizes Understanding  Comment: see eval    Education Comments  No comments found.              [1]   Patient Active Problem List  Diagnosis    Adenocarcinoma of prostate (Multi)    Anemia of chronic disease    Atrophy of urethral cuff associated with artificial urinary sphincter    Chronic hepatitis C (Multi)    Diabetes mellitus type 2 without retinopathy (Multi)    Hyperlipidemia    GERD (gastroesophageal reflux disease)    Goiter    Tertiary hyperparathyroidism (Multi)    Immunosuppression    Kidney replaced by transplant ([facility])    Male erectile  disorder of organic origin    Essential hypertension    Peripheral vascular disease    Vitamin D deficiency    Aortic aneurysm    Pancytopenia    MGUS (monoclonal gammopathy of unknown significance)    Pyelonephritis    Fluid overload, unspecified    Focal and segmental proliferative glomerulonephritis    Immunosuppressive management encounter following kidney transplant    Kidney transplanted ([facility])    Acute renal failure superimposed on chronic kidney disease    Allergic conjunctivitis    Allergic reaction    Cellulitis    Cholecystitis    Chronic low back pain    Dehydration    Disease due to severe acute respiratory syndrome coronavirus 2 (SARS-CoV-2)    Generalized hyperhidrosis    History of colonic polyps    Immunodeficiency due to drugs (CODE)    Long term (current) use of calcineurin inhibitor    Methicillin resistant Staphylococcus aureus infection    Personal history of COVID-19    Pleural effusion    Rash    Residual hemorrhoidal skin tags    Radiculitis    ESRD (end stage renal disease) on dialysis (Multi)    Hypervolemia, unspecified hypervolemia type    Acute lower urinary tract infection    Blepharitis    Dyslipidemia    Symptoms involving urinary system    Pneumonia of both lungs due to infectious organism, unspecified part of lung    Pre-transplant evaluation for kidney transplant    Chronic heart failure with preserved ejection fraction    Type 2 diabetes mellitus with stage 4 chronic kidney disease, with long-term current use of insulin (Multi)    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

## 2025-05-27 NOTE — CARE PLAN
The patient's goals for the shift include      The clinical goals for the shift include Pt will remain safe from falls this shift      Problem: Safety - Adult  Goal: Free from fall injury  Outcome: Progressing     Problem: Discharge Planning  Goal: Discharge to home or other facility with appropriate resources  Outcome: Progressing     Problem: Chronic Conditions and Co-morbidities  Goal: Patient's chronic conditions and co-morbidity symptoms are monitored and maintained or improved  Outcome: Progressing

## 2025-05-27 NOTE — PROGRESS NOTES
Reason For Consult  Dilaysis, failed allograft    History Of Present Illness  Manolo Bashir is a 69 y.o. male presenting with strep hominis bacteremia and right shoulder pain. He has a history of ESRD s/p renal transplant x2, non-functioning (initially 1992 c/b allograft failure in 2006, 2nd transplant 2013 c/b impaired allograft function), currently on iHD since 05/2024 (Tues/Thurs/Sat), MGUS, T2DM, HTN, prostate cx s/p radical prostatectomy, HCV s/p SVR (treated w/Harvoni), recent C Diff (treated w/ PO vancomycin 02/2025) initially presenting to the ED with positive bloo d culture as noted above.    Subjective: no complaints.  Still reports right shoulder pain.     Past Medical History  He has a past medical history of Anemia, Arthritis, Cataract, Chronic kidney disease, stage 3 unspecified (Multi) (09/26/2018), CKD (chronic kidney disease), Cough (02/12/2024), COVID-19 (06/18/2020), Diabetes (Multi), ESRD (end stage renal disease) (Multi), Focal and segmental proliferative glomerulonephritis (12/23/2023), HTN (hypertension), Hyperlipidemia, Other long term (current) drug therapy (07/20/2021), Personal history of other diseases of the circulatory system, Personal history of other infectious and parasitic diseases (08/17/2015), Polyp, colonic (08/17/2023), Primary osteoarthritis of both ankles (08/17/2023), Prostate cancer (Multi), Tubular adenoma of colon (08/17/2023), and Unspecified kidney failure (08/17/2016).    Surgical History  He has a past surgical history that includes Prostatectomy (10/11/2013); Ileostomy (04/25/2017); Other surgical history (04/21/2017); Ileostomy closure (08/17/2015); Other surgical history (08/17/2015); Other surgical history (08/17/2015); US guided percutaneous peritoneal or retroperitoneal fluid collection drainage (10/20/2022); transplant, kidney, open (1992); transplant, kidney, open (2013); and US guided percutaneous biopsy renal left (Left, 11/20/2023).     Social History  He  "reports that he has never smoked. He has been exposed to tobacco smoke. He has never used smokeless tobacco.  Drug: Oxycodone. He reports that he does not drink alcohol.    Family History  Family History[1]     Allergies  Patient has no known allergies.    Review of Systems  Review of  14 systems was performed system by system. See HPI. Otherwise, the symptoms were negative.     Physical Exam  Vital signs - reviewed.   General Appearance - NAD, Good speech, oriented and alert  HEENT - Supple. Not pale. No jaundice.   CVS - RRR. Normal S1/S2. No murmur, click , rub or gallop  Lungs- clear to auscultation bilaterally  Abdomen - soft , not tender, no guarding, no rigidity. No hepatosplenomegaly. Normal bowel sounds. No masses and ascites. S/P Kidney transplant .  Transplanted kidney is not tender.   Musculoskeletal /Extremities - no edema. Full ROM. No joint tenderness.   Neuro/Psych - appropriate mood and affect. Motor power V/V all extremities. CN I -XII were grossly intact.  Skin - No visible rash  Access - LUE AVF       Last Recorded Vitals  Blood pressure 123/61, pulse 70, temperature 36.7 °C (98.1 °F), temperature source Temporal, resp. rate 17, height 1.727 m (5' 7.99\"), weight 65 kg (143 lb 4.8 oz), SpO2 98%.    Relevant Results  Results for orders placed or performed during the hospital encounter of 05/25/25 (from the past 24 hours)   POCT GLUCOSE   Result Value Ref Range    POCT Glucose 78 74 - 99 mg/dL   Type and screen   Result Value Ref Range    ABO TYPE O     Rh TYPE POS     ANTIBODY SCREEN NEG    CBC   Result Value Ref Range    WBC 5.3 4.4 - 11.3 x10*3/uL    nRBC 0.0 0.0 - 0.0 /100 WBCs    RBC 2.80 (L) 4.50 - 5.90 x10*6/uL    Hemoglobin 7.5 (L) 13.5 - 17.5 g/dL    Hematocrit 24.3 (L) 41.0 - 52.0 %    MCV 87 80 - 100 fL    MCH 26.8 26.0 - 34.0 pg    MCHC 30.9 (L) 32.0 - 36.0 g/dL    RDW 14.3 11.5 - 14.5 %    Platelets 112 (L) 150 - 450 x10*3/uL   POCT GLUCOSE   Result Value Ref Range    POCT Glucose 79 74 " - 99 mg/dL   POCT GLUCOSE   Result Value Ref Range    POCT Glucose 142 (H) 74 - 99 mg/dL   POCT GLUCOSE   Result Value Ref Range    POCT Glucose 154 (H) 74 - 99 mg/dL   Comprehensive metabolic panel   Result Value Ref Range    Glucose 158 (H) 74 - 99 mg/dL    Sodium 137 136 - 145 mmol/L    Potassium 4.1 3.5 - 5.3 mmol/L    Chloride 97 (L) 98 - 107 mmol/L    Bicarbonate 33 (H) 21 - 32 mmol/L    Anion Gap 11 10 - 20 mmol/L    Urea Nitrogen 17 6 - 23 mg/dL    Creatinine 6.62 (H) 0.50 - 1.30 mg/dL    eGFR 8 (L) >60 mL/min/1.73m*2    Calcium 9.0 8.6 - 10.6 mg/dL    Albumin 2.8 (L) 3.4 - 5.0 g/dL    Alkaline Phosphatase 73 33 - 136 U/L    Total Protein 6.5 6.4 - 8.2 g/dL    AST 13 9 - 39 U/L    Bilirubin, Total 0.3 0.0 - 1.2 mg/dL    ALT 13 10 - 52 U/L   CBC and Auto Differential   Result Value Ref Range    WBC 4.7 4.4 - 11.3 x10*3/uL    nRBC 0.0 0.0 - 0.0 /100 WBCs    RBC 2.79 (L) 4.50 - 5.90 x10*6/uL    Hemoglobin 7.4 (L) 13.5 - 17.5 g/dL    Hematocrit 24.0 (L) 41.0 - 52.0 %    MCV 86 80 - 100 fL    MCH 26.5 26.0 - 34.0 pg    MCHC 30.8 (L) 32.0 - 36.0 g/dL    RDW 14.4 11.5 - 14.5 %    Platelets 111 (L) 150 - 450 x10*3/uL    Neutrophils % 65.9 40.0 - 80.0 %    Immature Granulocytes %, Automated 0.6 0.0 - 0.9 %    Lymphocytes % 15.8 13.0 - 44.0 %    Monocytes % 12.8 2.0 - 10.0 %    Eosinophils % 4.5 0.0 - 6.0 %    Basophils % 0.4 0.0 - 2.0 %    Neutrophils Absolute 3.08 1.20 - 7.70 x10*3/uL    Immature Granulocytes Absolute, Automated 0.03 0.00 - 0.70 x10*3/uL    Lymphocytes Absolute 0.74 (L) 1.20 - 4.80 x10*3/uL    Monocytes Absolute 0.60 0.10 - 1.00 x10*3/uL    Eosinophils Absolute 0.21 0.00 - 0.70 x10*3/uL    Basophils Absolute 0.02 0.00 - 0.10 x10*3/uL   Blood Culture    Specimen: Peripheral Venipuncture; Blood culture   Result Value Ref Range    Blood Culture Loaded on Instrument - Culture in progress    Blood Culture    Specimen: Peripheral Venipuncture; Blood culture   Result Value Ref Range    Blood Culture Loaded  on Instrument - Culture in progress      *Note: Due to a large number of results and/or encounters for the requested time period, some results have not been displayed. A complete set of results can be found in Results Review.          Assessment/Plan     Manolo Bashir is a 69 y.o. male presenting with strep hominis bacteremia and right shoulder pain. He has a history of ESRD s/p renal transplant x2, non-functioning (initially 1992 c/b allograft failure in 2006, 2nd transplant 2013 c/b impaired allograft function), currently on iHD since 05/2024 (Tues/Thurs/Sat)    ESKD  - no urgent need for HD today  - plan to switch back to HD TTS if possible  - Renal vitamins    Immunosuppression  In the setting of active infection  - TAC 0.5 mg/day  No need to follow FK level  - continue pred 5 mg/day    CKD-MBD  Ca/Phos ok    BP - above goal  Expect improvement with UF  On coreg 37.5 mg BID  Nifedipine 90 mg BID  Imdur 30 mg/day    Infection  On empiric vancomycin  Repeat BC 5/25 pending    Heme  Hold IV iron during active infection  DARIANA Ireland MD         [1]   Family History  Problem Relation Name Age of Onset    Bone cancer Mother      Other (corona's sarcome of the bone marrow) Mother      Prostate cancer Father      Diabetes Other Family Hist     Hypertension Other Family Hist

## 2025-05-27 NOTE — CONSULTS
"Nutrition Assessment      Reason for Assessment: Admission nursing screening    69-year-old male initially presenting to the ED with positive blood cultures and admitted for mild hyperkalemia. Currently, only complains of R shoulder pain previously noted during last presentation, not acutely worsened.     ID consulted for Bacteremia.      Pmhx of ESRD s/p renal transplant x2, non-functioning (initially 1992 c/b allograft failure in 2006, 2nd transplant 2013 c/b impaired allograft function), currently on iHD since 05/2024 (Tues/Thurs/Sat), MGUS, T2DM, HTN, prostate cx s/p radical prostatectomy, HCV s/p SVR (treated w/Harvoni), recent C Diff (treated w/ PO vancomycin 02/2025)     Nutrition History:  - Patient reports poor PO intake this admission due to diet restrictions (no taste) and pain.     Food and Nutrient History: Patient reports decreased appetite and PO intake since starting HD a year ago. Daughter at bedside is in agreement. \"He doesn't have an appetite.\" Patient states he typically eats 2-3 meals a day, but lesser amounts than what he was used to. Doesn't always eat breakfast. Both patient and his daughter cook. Daughter states patient drinks Boost \"but, not every day\". Patient agrees to a daily serving of Ensure while admitted.  Vitamin/Herbal Supplement Use: Takes a daily Renal MVI  Food Allergy:  (Denies)       Anthropometrics:  Height: 172.7 cm (5' 7.99\")   Weight: 65 kg (143 lb 4.8 oz)   BMI (Calculated): 21.79  IBW/kg (Dietitian Calculated): 70 kg  Percent of IBW: 93 %                      Weight History:   Wt Readings from Last 25 Encounters:   05/25/25 65 kg (143 lb 4.8 oz)   05/21/25 70.3 kg (155 lb)   05/14/25 70.2 kg (154 lb 12.8 oz)   05/14/25 68.5 kg (151 lb)   04/30/25 66.6 kg (146 lb 12.8 oz)   04/23/25 68.9 kg (152 lb)   04/14/25 68.9 kg (151 lb 14.4 oz)   03/31/25 68.9 kg (152 lb)   03/25/25 73.2 kg (161 lb 6 oz)   02/27/25 68 kg (150 lb)   02/24/25 70.1 kg (154 lb 9.6 oz)   02/24/25 70.1 " kg (154 lb 9.6 oz)   02/04/25 73.3 kg (161 lb 9.6 oz)   01/05/25 71.7 kg (158 lb)   12/16/24 72.8 kg (160 lb 8 oz)   12/11/24 72.6 kg (160 lb)   12/04/24 73.9 kg (163 lb)   10/02/24 70.8 kg (156 lb)   09/25/24 70.3 kg (155 lb)   09/10/24 72.6 kg (160 lb)   09/04/24 70 kg (154 lb 5.2 oz)   08/24/24 72.6 kg (160 lb)   08/09/24 71.4 kg (157 lb 6.4 oz)   07/10/24 71.7 kg (158 lb)   07/03/24 70.4 kg (155 lb 3.2 oz)       Weight Change %:  Weight History / % Weight Change: Per chart review, patient weighed 170-171 lbs prior to starting HD. 12% weight loss x ~ 6 months  Significant Weight Loss: Yes  Interpretation of Weight Loss: >10% in 6 months    Nutrition Focused Physical Exam Findings:    Subcutaneous Fat Loss:   Orbital Fat Pads: Mild-Moderate (slight dark circles and slight hollowing)  Buccal Fat Pads: Mild-Moderate (flat cheeks, minimal bounce)  Triceps: Mild-Moderate (less than ample fat tissue)  Ribs: Defer  Muscle Wasting:  Temporalis: Mild-Moderate (slight depression)  Pectoralis (Clavicular Region): Mild-Moderate (some protrusion of clavicle)  Deltoid/Trapezius: Mild-Moderate (slight protrusion of acromion process)  Interosseous: Defer  Trapezius/Infraspinatus/Supraspinatus (Scapular Region): Defer  Quadriceps: Mild-Moderate (mild depression on inner and outer thigh)  Gastrocnemius: Mild-Moderate (not well developed muscle)  Edema:  Edema: none  Physical Findings:  Hair: Negative  Eyes: Negative  Nails: Negative  Skin: Negative  Digestive System Findings:  (loss of appetite)  Mouth Findings:  (Denies problems chewing or swallowing.)    Nutrition Significant Labs:  CBC Trend:   Results from last 7 days   Lab Units 05/27/25  0815 05/26/25  1445 05/25/25  1805 05/20/25  2218   WBC AUTO x10*3/uL 4.7 5.3 4.8 4.1*   RBC AUTO x10*6/uL 2.79* 2.80* 2.66* 2.81*   HEMOGLOBIN g/dL 7.4* 7.5* 7.1* 7.6*   HEMATOCRIT % 24.0* 24.3* 21.9* 23.1*   MCV fL 86 87 82 82   PLATELETS AUTO x10*3/uL 111* 112* 120* 109*    , BMP Trend:    Results from last 7 days   Lab Units 05/27/25  0815 05/26/25  0732 05/25/25  1805 05/20/25  2218   GLUCOSE mg/dL 158* 58* 234* 376*   CALCIUM mg/dL 9.0 9.4 9.6 10.0   SODIUM mmol/L 137 137 137 130*   POTASSIUM mmol/L 4.1 4.9 5.5* 5.6*   CO2 mmol/L 33* 26 28 26   CHLORIDE mmol/L 97* 101 102 96*   BUN mg/dL 17 26* 25* 31*   CREATININE mg/dL 6.62* 10.02* 8.76* 7.19*    , A1C:  Lab Results   Component Value Date    HGBA1C 8.5 (A) 05/14/2025   , BG POCT trend:   Results from last 7 days   Lab Units 05/27/25  0759 05/26/25 2129 05/26/25  1722 05/26/25  1423 05/26/25  0755   POCT GLUCOSE mg/dL 154* 142* 79 78 103*    , Renal Lab Trend:   Results from last 7 days   Lab Units 05/27/25  0815 05/26/25  0732 05/25/25  1805 05/20/25  2218   POTASSIUM mmol/L 4.1 4.9 5.5* 5.6*   SODIUM mmol/L 137 137 137 130*   MAGNESIUM mg/dL  --  1.87  --  1.83   EGFR mL/min/1.73m*2 8* 5* 6* 8*   BUN mg/dL 17 26* 25* 31*   CREATININE mg/dL 6.62* 10.02* 8.76* 7.19*    , Vit D:   Lab Results   Component Value Date    VITD25 21 (L) 04/25/2025    , Vit B12:   Lab Results   Component Value Date    [id number] 338 05/01/2024        Nutrition Specific Medications:    Scheduled medications  carvedilol, 37.5 mg, oral, BID  epoetin aida or biosimilar, 150 Units/kg, subcutaneous, Once per day on Monday Wednesday Friday  heparin (porcine), 5,000 Units, subcutaneous, q8h  insulin glargine, 15 Units, subcutaneous, q AM  insulin lispro, 0-5 Units, subcutaneous, TID AC  isosorbide mononitrate ER, 30 mg, oral, Daily  NIFEdipine ER, 90 mg, oral, BID  pantoprazole, 40 mg, oral, Daily before breakfast  pravastatin, 10 mg, oral, Nightly  predniSONE, 5 mg, oral, Daily  sodium zirconium cyclosilicate, 5 g, oral, q8h  tacrolimus, 0.5 mg, oral, Daily  vitamin B complex-vitamin C-folic acid, 1 capsule, oral, Daily    Continuous medications     PRN medications  PRN medications: dextrose, dextrose, glucagon, glucagon, HYDROmorphone, HYDROmorphone, polyethylene  "glycol    I/O:   Last BM Date: 05/26/25; Stool Appearance: Unable to assess (05/26/25 1300)    Dietary Orders (From admission, onward)       Start     Ordered    05/26/25 0519  Adult diet Consistent Carb, Renal; CCD 75 gm/meal; Potassium Restricted 2 gm (50mEq); 2 - 3 grams Sodium  Diet effective now        Question Answer Comment   Diet type Consistent Carb    Diet type Renal    Carb diet selection: CCD 75 gm/meal    Potassium restriction: Potassium Restricted 2 gm (50mEq)    Sodium restriction: 2 - 3 grams Sodium        05/26/25 0518    05/25/25 2230  May Participate in Room Service  ( ROOM SERVICE MAY PARTICIPATE)  Once        Question:  .  Answer:  Yes    05/25/25 2229                     Estimated Needs:   Total Energy Estimated Needs in 24 hours (kCal): 1950 kCal  Method for Estimating Needs: 65 kg / 30 kcal  Total Protein Estimated Needs in 24 Hours (g): 90 g  Method for Estimating 24 Hour Protein Needs: 65 kg / 1.4 g              Nutrition Diagnosis   Malnutrition Diagnosis  Patient has Malnutrition Diagnosis: Yes  Malnutrition Diagnosis: Moderate malnutrition related to chronic disease or condition  As Evidenced by: moderate fat loss and muscle wasting per physical exam, meeting < 75% of EER for > 1 month and 21% weight loss x 6 months            Nutrition Interventions/Recommendations   Nutrition prescription for oral nutrition    Nutrition Recommendations:  Individualized Nutrition Prescription Provided for : Diet liberalized to Regular given moderate malnutrition and suboptimal oral intake. Ensure (350 kcal and 13 g protein) ordered daily. Recommend Vitamin D 50 mcg daily as patient with low Vitamin D level (21). Continue Renal MVI.    Nutrition Interventions/Goals:   Meals and Snacks: General healthful diet  Medical Food Supplement: Commercial beverage medical food supplement therapy  Goal: Drink 1 Ensure daily      Education Documentation  Patient declined Renal diet review. \"I try to stay close to " "my diet.\" Daughter reports written handouts for Renal diet at home.             Nutrition Monitoring and Evaluation   Food/Nutrient Related History Monitoring  Monitoring and Evaluation Plan: Intake / amount of food  Intake / Amount of food: Consumes at least 50% or more of meals/snacks/supplements    Anthropometric Measurements  Monitoring and Evaluation Plan: Body weight  Body Weight: Body weight - Maintain stable weight    Biochemical Data, Medical Tests and Procedures  Monitoring and Evaluation Plan: Electrolyte/renal panel, Glucose/endocrine profile  Electrolyte and Renal Panel: Electrolytes within normal limits  Glucose/Endocrine Profile: Glucose within normal limits ( mg/dL)         Goal Status: New goal(s) identified    Time Spent (min): 60 minutes       "

## 2025-05-27 NOTE — CONSULTS
Inpatient consult to Infectious Diseases  Consult performed by: Sal Ramírez MD  Consult ordered by: Holden Rogers MD        Referred by     Primary MD: Elma Hutton, ANÍBAL-CNP, [facility]    Reason For Consult: Bacteremia    History Of Present Illness  Manolo Basihr is a 69 y.o. male with history of diabetes mellitus type 2, hypertension, MGUS, prostate cancer status post radical prostatectomy, chronic HCV status post treatment with Harvoni, ESRD status post HD and KT X2 (initial transplant in 1992 and failed in 2006, second transplant in 2013 and failed in 2024) and currently on HD via left upper arm aneurysmal fistula on Tuesday/Thursday and Saturday (also has tunneled HD which was placed in March for AV fistula stenosis) and multiple hospital admissions for hypoxia and altered mental status, admission in March for hypoxia and empirically started on Cresemba for GGO but patient discontinued 2 weeks later for diarrhea (followed up with ID in May 2025 and patient's hypoxia resolved so did not resume antifungal) and later on diagnosed with norovirus infection and recent ED visit for right shoulder pain was called to come back to emergency room for positive blood cultures with GPC.    Patient presented to emergency room on May 20 for shoulder pain.  Patient had 2 sets of blood cultures and one of them grew GPC and patient was called to get admitted on May 25.  Patient had repeat blood cultures performed.  Patient was seen by Ortho on May 20 and underwent right shoulder arthrocentesis and recommended MRI of shoulder as outpatient.    Patient states he is having right shoulder pain since right TDC placement in March 2025.  Patient denies trauma.  CT scan performed on May 28 showed right coracoid fracture and lytic lesions concerning for brown tumor or other.  Patient also underwent arthrocentesis as outpatient a couple of days prior to presentation on May 20 & and synovial fluid cultures were negative at  that time.    Patient denied fever or chills prior to admission.  Patient's main complaint is right shoulder pain.     Past Medical History  He has a past medical history of Anemia, Arthritis, Cataract, Chronic kidney disease, stage 3 unspecified (Multi) (09/26/2018), CKD (chronic kidney disease), Cough (02/12/2024), COVID-19 (06/18/2020), Diabetes (Multi), ESRD (end stage renal disease) (Multi), Focal and segmental proliferative glomerulonephritis (12/23/2023), HTN (hypertension), Hyperlipidemia, Other long term (current) drug therapy (07/20/2021), Personal history of other diseases of the circulatory system, Personal history of other infectious and parasitic diseases (08/17/2015), Polyp, colonic (08/17/2023), Primary osteoarthritis of both ankles (08/17/2023), Prostate cancer (Multi), Tubular adenoma of colon (08/17/2023), and Unspecified kidney failure (08/17/2016).    Surgical History  He has a past surgical history that includes Prostatectomy (10/11/2013); Ileostomy (04/25/2017); Other surgical history (04/21/2017); Ileostomy closure (08/17/2015); Other surgical history (08/17/2015); Other surgical history (08/17/2015); US guided percutaneous peritoneal or retroperitoneal fluid collection drainage (10/20/2022); transplant, kidney, open (1992); transplant, kidney, open (2013); and US guided percutaneous biopsy renal left (Left, 11/20/2023).     Social History     Occupational History    Not on file   Tobacco Use    Smoking status: Never     Passive exposure: Past    Smokeless tobacco: Never   Vaping Use    Vaping status: Never Used   Substance and Sexual Activity    Alcohol use: Never    Drug use: Not on file    Sexual activity: Defer     Travel History   Travel since 04/27/25    No documented travel since 04/27/25            Family History  Family History[1]  Allergies  Patient has no known allergies.     Immunization History   Administered Date(s) Administered    Hep A / Hep B 08/17/2015    Hep A, Unspecified  08/17/2015    Hepatitis A vaccine, age 19 years and greater (HAVRIX) 08/17/2015    Influenza, Unspecified 09/22/2009    Influenza, seasonal, injectable 09/22/2009    Pneumococcal Conjugate PCV 7 1956     Medications  Home medications:  Prescriptions Prior to Admission[2]  Current medications:  Scheduled medications  Scheduled Medications[3]  Continuous medications  Continuous Medications[4]  PRN medications  PRN Medications[5]    Review of Systems negative except above     Objective  Range of Vitals (last 24 hours)  Heart Rate:  [67-87]   Temp:  [36.5 °C (97.7 °F)-37.5 °C (99.5 °F)]   Resp:  [16-17]   BP: (123-174)/(55-69)   SpO2:  [98 %-100 %]   Daily Weight  05/25/25 : 65 kg (143 lb 4.8 oz)    Body mass index is 21.79 kg/m².     Physical Exam  GENERAL APPEARANCE: Awake and alert and not in any distress  HEENT: Atraumatic and normocephalic  CARDIAC: Regular   LUNGS: Clear  ABDOMEN: Old laparotomy incisions noted.  Soft and nontender.  EXTREMITIES: No edema  SKIN: Right side chest tunneled HD line noted with no signs of infection around it.  Left upper arm aneurysmal AV fistula noted.  Thrombosed AV fistula noted over right upper arm and forearm.  MSK: There is significant swelling over anterior right shoulder which is tender to palpation.  There is no increased warmth.  I did not check movements around right shoulder joint as patient is already seen by Ortho.       Labs  Results from last 72 hours   Lab Units 05/27/25  0815 05/26/25  1445 05/25/25  1805   WBC AUTO x10*3/uL 4.7 5.3 4.8   HEMOGLOBIN g/dL 7.4* 7.5* 7.1*   HEMATOCRIT % 24.0* 24.3* 21.9*   PLATELETS AUTO x10*3/uL 111* 112* 120*   NEUTROS PCT AUTO % 65.9  --  74.6   LYMPHS PCT AUTO % 15.8  --  12.3   MONOS PCT AUTO % 12.8  --  10.0   EOS PCT AUTO % 4.5  --  2.3     Results from last 72 hours   Lab Units 05/27/25  0815 05/26/25  0732 05/25/25  1805   SODIUM mmol/L 137 137 137   POTASSIUM mmol/L 4.1 4.9 5.5*   CHLORIDE mmol/L 97* 101 102   CO2 mmol/L  33* 26 28   BUN mg/dL 17 26* 25*   CREATININE mg/dL 6.62* 10.02* 8.76*   GLUCOSE mg/dL 158* 58* 234*   CALCIUM mg/dL 9.0 9.4 9.6   ANION GAP mmol/L 11 15 13   EGFR mL/min/1.73m*2 8* 5* 6*     Results from last 72 hours   Lab Units 05/27/25  0815 05/26/25  0732 05/25/25  1805   ALK PHOS U/L 73 73 80   BILIRUBIN TOTAL mg/dL 0.3 0.4 0.4   PROTEIN TOTAL g/dL 6.5 6.3* 6.7   ALT U/L 13 16 16   AST U/L 13 16 13   ALBUMIN g/dL 2.8* 2.8* 3.0*     Estimated Creatinine Clearance: 9.8 mL/min (A) (by C-G formula based on SCr of 6.62 mg/dL (H)).  C-Reactive Protein   Date Value Ref Range Status   05/20/2025 9.05 (H) <1.00 mg/dL Final   03/18/2025 4.59 (H) <1.00 mg/dL Final   02/28/2025 1.88 (H) <1.00 mg/dL Final     Sedimentation Rate   Date Value Ref Range Status   05/20/2025 32 (H) 0 - 20 mm/h Final   03/18/2025 39 (H) 0 - 20 mm/h Final   10/20/2022 28 (H) 0 - 20 mm/h Final     HIV 1/2 Antigen/Antibody Screen with Reflex to Confirmation   Date Value Ref Range Status   03/18/2025 Nonreactive Nonreactive Final     Hepatitis C AB   Date Value Ref Range Status   03/18/2025 Reactive (A) Nonreactive Final     Comment:     HCV antibody detected. HCV RNA PCR has been ordered to evaluate the possibility of a current infection.     Results from patients taking biotin supplements or receiving high-dose biotin therapy should be interpreted with caution due to possible interference with this test. Providers may contact their local laboratory for further information.  Result has been updated to reportable.     HCV PCR Quant   Date Value Ref Range Status   03/04/2022 NOT DETECTED IU/mL Final     Comment:     REF VALUE  NOT DETECTED       Microbiology  Susceptibility data from last 90 days.  Collected Specimen Info Organism   05/25/25 Blood culture from Peripheral Venipuncture Staphylococcus hominis   05/20/25 Blood culture from Peripheral Venipuncture Staphylococcus hominis       Imaging         Assessment/Plan     Manolo Bashir is a 69 y.o.  male with history of diabetes mellitus type 2, hypertension, MGUS, prostate cancer status post radical prostatectomy, chronic HCV status post treatment with Harvoni, ESRD status post HD and KT X2 (initial transplant in 1992 and failed in 2006, second transplant in 2013 and failed in 2024) and currently on HD via left upper arm aneurysmal fistula on Tuesday/Thursday and Saturday (also has tunneled HD which was placed in March for AV fistula stenosis) and multiple hospital admissions for hypoxia and altered mental status, admission in March for hypoxia and empirically started on Cresemba for GGO but patient discontinued 2 weeks later for diarrhea (followed up with ID in May 2025 and patient's hypoxia resolved so did not resume antifungal) and later on diagnosed with norovirus infection and recent ED visit for right shoulder pain was called to come back to emergency room for positive blood cultures with GPC.    Problems  Positive blood cultures with Staph hominis-contaminant        1 out of 2 sets of peripheral blood cultures on May 20 and 1 out of 2 peripheral blood cultures on May 27 grew staph hominis.    2.  Right shoulder swelling and pain-hemarthrosis and right coracoid fracture       Synovial fluid analysis not consistent with infection and cultures negative.  Ortho on board.    3.  History of kidney transplant x 2-failed and currently on HD      Plan  No need for antibiotic therapy as Staph hominis is a contaminant.  Please check with transplant nephrology if tunneled HD line can be discontinued as patient has been using AV fistula for the past 2 weeks (scheduled to have tunneled HD removed next 2 week).  Management of right shoulder pain by Ortho.  No need to resume antifungal therapy (patient seen by outpatient ID and did not feel antifungal was necessary).  ID will sign off.    Sal Ramírez MD       [1]   Family History  Problem Relation Name Age of Onset    Bone cancer Mother      Other (corona's  sarcome of the bone marrow) Mother      Prostate cancer Father      Diabetes Other Family Hist     Hypertension Other Family Hist    [2]   Medications Prior to Admission   Medication Sig Dispense Refill Last Dose/Taking    blood sugar diagnostic (True Metrix Glucose Test Strip) For BG check 4x/day 400 strip 3     carvedilol (Coreg) 12.5 mg tablet Take 3 tablets (37.5 mg) by mouth 2 times a day. Hold for systolic BP <140 and HR <60. PATIENT NEEDS TO FOLLOW UP WITH CARDIOLOGY 90 tablet 3     cinacalcet (Sensipar) 30 mg tablet Take 1 tablet (30 mg) by mouth once daily. 30 tablet 1     clotrimazole (Lotrimin) 1 % cream Apply topically 2 times a day. 90 g 0     Easy Touch Alcohol Prep Pads pads, medicated        fluocinonide (Lidex) 0.05 % ointment Apply to affected areas twice daily when active as needed. Use less than 14 days per month. (Patient not taking: Reported on 5/14/2025) 60 g 3     glucagon (Gvoke HypoPen 1-Pack) 1 mg/0.2 mL auto-injector Inject 1 mg into the muscle every 15 minutes if needed (For blood glucose 41 to 70 mg/dL and no IV access). 0.2 mL 12     imiquimod (Aldara) 5 % cream Apply 1 packet topically 3 times a week. (Patient not taking: Reported on 5/14/2025) 12 packet 3     insulin glargine (Lantus) 100 unit/mL (3 mL) pen 20 units daily 15 mL 0     insulin lispro (HumaLOG) 100 unit/mL pen 4 units with lunch and dinner plus a sliding scale up to 20 units daily 15 mL 6     isavuconazonium sulfate (Cresemba) 186 mg capsule capsule Take 2 capsules (372 mg) by mouth once daily. Take 2 capsules 3/6 at 10pm and 2 capsules 3/7 6am, then starting 3/8 take 2 capsules once a day (Patient not taking: Reported on 5/14/2025) 180 capsule 0     isosorbide mononitrate ER (Imdur) 30 mg 24 hr tablet Take 1 tablet (30 mg) by mouth once daily. Do not crush or chew. 30 tablet 0     lancets 33 gauge misc 1 each 3 times a day. 100 each 3     lancing device misc use as directed 1 each 0     metoclopramide (Reglan) 5 mg  "tablet Take 1 tablet (5 mg) by mouth 3 times a day before meals. 90 tablet 1     naloxone (Narcan) 4 mg/0.1 mL nasal spray Administer 1 spray (4 mg) into affected nostril(s) if needed for opioid reversal. May repeat every 2-3 minutes if needed, alternating nostrils, until medical assistance becomes available. 2 each 0     NIFEdipine ER (Adalat CC) 90 mg 24 hr tablet Take 1 tablet (90 mg) by mouth 2 times a day. Do not crush, chew, or split. 180 tablet 3     oxyCODONE-acetaminophen (Percocet) 5-325 mg tablet Take 1 tablet by mouth every 6 hours if needed for severe pain (7 - 10) for up to 7 days. 28 tablet 0     pantoprazole (ProtoNix) 40 mg EC tablet Take 1 tablet (40 mg) by mouth once daily in the morning. Take before meals. Do not crush, chew, or split. Do not start before January 22, 2024. 30 tablet 11     pen needle, diabetic (TechLITE Pen Needle) 32 gauge x 5/32\" needle Use 4 a day as directed 400 each 3     pravastatin (Pravachol) 10 mg tablet Take 1 tablet (10 mg) by mouth once daily at bedtime. 90 tablet 1     predniSONE (Deltasone) 5 mg tablet Take 1 tablet (5 mg) by mouth once daily. 30 tablet 11     sevelamer carbonate (Renvela) 800 mg tablet Take 1 tablet (800 mg) by mouth 3 times a day before meals. Swallow tablet whole; do not crush, break, or chew. 90 tablet 0     sodium chloride (Ocean) 0.65 % nasal spray Administer 1 spray into each nostril 4 times a day as needed for congestion. 44 mL 12     syringe with needle 3 mL 25 x 5/8\" syringe Use to inject epogen. (Patient not taking: Reported on 5/14/2025) 7 each 0     tacrolimus (Prograf) 0.5 mg capsule Take 1 capsule (0.5 mg) by mouth 2 times a day. 60 capsule 11     tacrolimus (Protopic) 0.1 % ointment Apply topically 2 times a day. (Patient not taking: Reported on 2/28/2025) 60 g 3     vitamin B complex-vitamin C-folic acid (Nephrocaps) 1 mg capsule Take 1 capsule by mouth once daily. 30 capsule 11    [3] carvedilol, 37.5 mg, oral, BID  epoetin aida or " biosimilar, 150 Units/kg, subcutaneous, Once per day on Monday Wednesday Friday  heparin (porcine), 5,000 Units, subcutaneous, q8h  insulin glargine, 15 Units, subcutaneous, q AM  insulin lispro, 0-5 Units, subcutaneous, TID AC  isosorbide mononitrate ER, 30 mg, oral, Daily  NIFEdipine ER, 90 mg, oral, BID  pantoprazole, 40 mg, oral, Daily before breakfast  pravastatin, 10 mg, oral, Nightly  predniSONE, 5 mg, oral, Daily  sodium zirconium cyclosilicate, 5 g, oral, q8h  tacrolimus, 0.5 mg, oral, Daily  vitamin B complex-vitamin C-folic acid, 1 capsule, oral, Daily    [4]    [5] PRN medications: dextrose, dextrose, glucagon, glucagon, HYDROmorphone, HYDROmorphone, polyethylene glycol

## 2025-05-27 NOTE — ASSESSMENT & PLAN NOTE
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      Updates 5/27  - 5/25 Blood culture with positive cocci clusters, likely contaminated as no signs of infection will repeat blood culure and CTM   - Had dialysis yesterday, tacro decreased to 0.5 daily per transplant   -Transplant ID consulted , appreciate recs   - Hyperkalemia is improving,  5.5>4.9>4.1 today   - Will reach out to ID regarding savuconazole and atypical pneumonia           #Hyperkalemia  #ESRD s/p renal transplant x2, non-functioning  :: K of 5.5 on presentation to ED  :: Last session dialysis on Saturday which he reported he completed fully  :: Given calcium gluconate x1  Plan:  - Lokelma q8h, and CTM K daily   - transplant nephrology is following appreciate recs   - Continue home immunosuppressive regiment: tacro 0.5 BID > changed to 0.5 daily on 5/26 , prednisone 5mg daily        #RUE fracture of coracoid process, humeral head  #R Shoulder pain  :: Fractures evident from CT findings 5/21/25  :: Evaluated by ortho previously, underwent joint aspiration, without evidence of septic arthritis  :: Joint aspiration results 5/20: bloody, 2.4k WBC, 88% neutrophil,   Plan:  - Planned for MRI shoulder for further characterization on Wednesday, consider inpatient if still here and covered by insurance  - Pain regiment: Tylenol for mild pain, IV dilaudid 0.2 moderate/0.4 severe or breakthrough pain    #HTN  - Continue home regiment: Carvedilol 37.5mg BID, Imdur 30mt daily  Nifedipine 90mg BID    #Anemia  :: Hb 7.1 on presentation (b/l 9-10)  :: Iron  panel 5/25 obtained by ED: Iron 25, TIBC 113, %sat 22  - Likely related to CKD however potential component of iron deficiency. Obtain ferritin, transferrin in addition to other labs    #Positive blood cx  :: Blood cx 5/20 at last ED presentation: 1 of 2 positive for staph hominis, other with no growth  - Likely contaminant, no evidence of current infectious process or symptoms  - Stop antibiotics and monitor  - Transplant ID consulted , appreciate recs   - Follow up repeat blood cx by ED    #T2DM  :: Home regiment: Glarine 20u AM + SSI  - 15u glargine AM while inpatient, SSI w/ meals    F: Caution with ESRD  E: replete PRN  N: Renal, carb controlled diet  A: pIV, R chest tunneled dialysis line    DVT ppx: Heparin subcutaneous  GI ppx: home PPI    Code status: Full (Confirmed)  Surrogate: Partner Amalia ([phone])

## 2025-05-27 NOTE — PROGRESS NOTES
Occupational Therapy    Evaluation    Patient Name: Manolo Bashir  MRN: [id number]  Department: [facility] 20  Room: 2007/2007-A  Today's Date: 5/27/2025  Time Calculation  Start Time: 0901  Stop Time: 0910  Time Calculation (min): 9 min    Assessment  IP OT Assessment  OT Assessment: NN, OP as needed pending POC R shoulder  Prognosis: Good  Barriers to Discharge Home: No anticipated barriers  Evaluation/Treatment Tolerance: Patient tolerated treatment well  Medical Staff Made Aware: Yes  End of Session Communication: Bedside nurse  End of Session Patient Position: Up in chair, Alarm off, not on at start of session  Plan:  No Skilled OT: Patient refusal  OT Frequency: OT eval only  OT Discharge Recommendations: No further acute OT (OP OT RUE as needed)  Equipment Recommended upon Discharge:  (none)  OT Recommended Transfer Status: Assist of 1  OT - OK to Discharge: Yes    Subjective   Current Problem:  1. Anemia, unspecified type        2. Positive blood culture        3. Chronic right shoulder pain          OT Visit Info:  OT Received On: 05/27/25  General Visit Info:  General  Reason for Referral: strep hominis bacteremia and right shoulder pain  Past Medical History Relevant to Rehab: ESRD s/p renal transplant x2, non-functioning (initially 1992 c/b allograft failure in 2006, 2nd transplant 2013 c/b impaired allograft function), currently on iHD since 05/2024 (Tues/Thurs/Sat), MGUS, T2DM, HTN, prostate cx s/p radical prostatectomy, HCV s/p SVR (treated w/Harvoni), recent C Diff (treated w/ PO vancomycin 02/2025)  Family/Caregiver Present: No  Co-Treatment: PT  Co-Treatment Reason: maximize pt safety  Prior to Session Communication: Bedside nurse  Patient Position Received: Bed, 2 rail up, Alarm off, not on at start of session  Precautions:  Medical Precautions: Fall precautions    Pain:  Pain Assessment  Pain Assessment: 0-10  0-10 (Numeric) Pain Score: 8  Pain Location:  (R shoulder)    Objective  "  Cognition:  Overall Cognitive Status: Within Functional Limits  Orientation Level: Oriented X4  Insight: Within function limits           Home Living:  Type of Home: House  Lives With:  (dtrs)  Home Adaptive Equipment: Walker rolling or standard, Cane  Home Layout:  (7 JAYLIN HR, flight to bed/bath)  Bathroom Shower/Tub: Tub/shower unit  Bathroom Toilet: Handicapped height  Bathroom Equipment:  (shower chair)   Prior Function:  Level of [place]: Independent with ADLs and functional transfers, Needs assistance with homemaking  Receives Help From: Family  Ambulatory Assistance: Independent  Vocational: On disability  Leisure: \"staying active\"  Hand Dominance: Right  IADL History:  IADL Comments: A cook/cleaning dtrs, - pets, +drive  ADL:  Eating Assistance: Independent  Grooming Assistance: Independent (anticipated)  Bathing Assistance: Independent (anticipated seatd)  UE Dressing Assistance: Independent  LE Dressing Assistance: Independent  Toileting Assistance with Device: Independent (anticipated)  Activity Tolerance:  Endurance: Endurance does not limit participation in activity  Bed Mobility/Transfers: Bed Mobility  Bed Mobility:  (sup to sit I)    Transfers  Transfer:  (sit/stand I)      Functional Mobility:  Functional Mobility 1  Functional Mobility Support Devices:  (pt performed fxnl mob to/from bed/x1 hallway distance to chair I)  Sitting Balance:  Dynamic Sitting Balance  Dynamic Sitting-Level of Assistance: Independent  Standing Balance:  Dynamic Standing Balance  Dynamic Standing-Level of Assistance: Independent    IADL's:   IADL Comments: A cook/cleaning dtrs, - pets, +drive  Vision: Vision - Basic Assessment  Current Vision: Wears glasses only for reading  Sensation:  Light Touch: No apparent deficits  Strength:  Strength Comments: RUE shoulder NWB conservative this date pending POC, distal WFL, LUE WFL       Coordination:  Movements are Fluid and Coordinated: Yes   Hand Function:  Hand " Function  Gross Grasp: Functional  Extremities: RUE   RUE :  (distal WFL) and LUE   LUE: Within Functional Limits      Outcome Measures: [facility] Daily Activity  Putting on and taking off regular lower body clothing: None  Bathing (including washing, rinsing, drying): None  Putting on and taking off regular upper body clothing: None  Toileting, which includes using toilet, bedpan or urinal: None  Taking care of personal grooming such as brushing teeth: None  Eating Meals: None  Daily Activity - Total Score: 24         and OT Adult Other Outcome Measures  4AT: -  Education Documentation  Precautions, taught by Laurie Loredo OT at 5/27/2025 11:07 AM.  Learner: Patient  Readiness: Acceptance  Method: Explanation, Demonstration  Response: Verbalizes Understanding  Comment: mirian SANTANAs    Body Mechanics, taught by Laurie Loredo OT at 5/27/2025 11:07 AM.  Learner: Patient  Readiness: Acceptance  Method: Explanation, Demonstration  Response: Verbalizes Understanding  Comment: mirian SANTANAs    ADL Training, taught by Laurie Loredo OT at 5/27/2025 11:07 AM.  Learner: Patient  Readiness: Acceptance  Method: Explanation, Demonstration  Response: Verbalizes Understanding  Comment: mirian childs ADLs    Education Comments  No comments found.

## 2025-05-27 NOTE — PROGRESS NOTES
TCC met with pt to discuss dispo plans for outpt PT/OT but pt declined stating he doesn't want outpt therapy on DC. TCC will update medical team.

## 2025-05-27 NOTE — NURSING NOTE
Report to Receiving RN:    Report To: AYO Banks  Time Report Called: 2054  Hand-Off Communication pt stable ,completed and tolerated HD tx with no issues, post vitals: 161/55, pulse 70, pt removed 2 liters  Complications During Treatment: No  Ultrafiltration Treatment: Yes  Medications Administered During Dialysis: No  Blood Products Administered During Dialysis: No  Labs Sent During Dialysis: No  Heparin Drip Rate Changes: No  Dialysis Catheter Dressing: N/A  Last Dressing Change: N/A, AVG      Last Updated: 8:53 PM by DEIRDRE NICOLE

## 2025-05-28 ENCOUNTER — PHARMACY VISIT (OUTPATIENT)
Dept: PHARMACY | Facility: CLINIC | Age: 70
End: 2025-05-28
Payer: COMMERCIAL

## 2025-05-28 ENCOUNTER — APPOINTMENT (OUTPATIENT)
Dept: RADIOLOGY | Facility: HOSPITAL | Age: 70
DRG: 981 | End: 2025-05-28
Payer: COMMERCIAL

## 2025-05-28 ENCOUNTER — APPOINTMENT (OUTPATIENT)
Dept: RADIOLOGY | Facility: CLINIC | Age: 70
End: 2025-05-28
Payer: MEDICARE

## 2025-05-28 VITALS
DIASTOLIC BLOOD PRESSURE: 60 MMHG | BODY MASS INDEX: 21.72 KG/M2 | WEIGHT: 143.3 LBS | OXYGEN SATURATION: 99 % | RESPIRATION RATE: 19 BRPM | TEMPERATURE: 98.6 F | HEART RATE: 68 BPM | SYSTOLIC BLOOD PRESSURE: 166 MMHG | HEIGHT: 68 IN

## 2025-05-28 LAB
ALBUMIN SERPL BCP-MCNC: 2.9 G/DL (ref 3.4–5)
ALP SERPL-CCNC: 68 U/L (ref 33–136)
ALT SERPL W P-5'-P-CCNC: 11 U/L (ref 10–52)
ANION GAP SERPL CALC-SCNC: 12 MMOL/L (ref 10–20)
AST SERPL W P-5'-P-CCNC: 13 U/L (ref 9–39)
BACTERIA FLD CULT: NORMAL
BASOPHILS # BLD AUTO: 0.02 X10*3/UL (ref 0–0.1)
BASOPHILS NFR BLD AUTO: 0.4 %
BILIRUB SERPL-MCNC: 0.4 MG/DL (ref 0–1.2)
BUN SERPL-MCNC: 27 MG/DL (ref 6–23)
CALCIUM SERPL-MCNC: 9.3 MG/DL (ref 8.6–10.6)
CHLORIDE SERPL-SCNC: 97 MMOL/L (ref 98–107)
CO2 SERPL-SCNC: 31 MMOL/L (ref 21–32)
CREAT SERPL-MCNC: 9.15 MG/DL (ref 0.5–1.3)
EGFRCR SERPLBLD CKD-EPI 2021: 6 ML/MIN/1.73M*2
EOSINOPHIL # BLD AUTO: 0.23 X10*3/UL (ref 0–0.7)
EOSINOPHIL NFR BLD AUTO: 4.9 %
ERYTHROCYTE [DISTWIDTH] IN BLOOD BY AUTOMATED COUNT: 14.3 % (ref 11.5–14.5)
GLUCOSE BLD MANUAL STRIP-MCNC: 122 MG/DL (ref 74–99)
GLUCOSE BLD MANUAL STRIP-MCNC: 156 MG/DL (ref 74–99)
GLUCOSE SERPL-MCNC: 110 MG/DL (ref 74–99)
GRAM STN SPEC: NORMAL
GRAM STN SPEC: NORMAL
HCT VFR BLD AUTO: 24 % (ref 41–52)
HGB BLD-MCNC: 7.6 G/DL (ref 13.5–17.5)
IMM GRANULOCYTES # BLD AUTO: 0.05 X10*3/UL (ref 0–0.7)
IMM GRANULOCYTES NFR BLD AUTO: 1.1 % (ref 0–0.9)
LYMPHOCYTES # BLD AUTO: 0.75 X10*3/UL (ref 1.2–4.8)
LYMPHOCYTES NFR BLD AUTO: 15.9 %
MCH RBC QN AUTO: 26.6 PG (ref 26–34)
MCHC RBC AUTO-ENTMCNC: 31.7 G/DL (ref 32–36)
MCV RBC AUTO: 84 FL (ref 80–100)
MONOCYTES # BLD AUTO: 0.69 X10*3/UL (ref 0.1–1)
MONOCYTES NFR BLD AUTO: 14.6 %
NEUTROPHILS # BLD AUTO: 2.97 X10*3/UL (ref 1.2–7.7)
NEUTROPHILS NFR BLD AUTO: 63.1 %
NRBC BLD-RTO: 0 /100 WBCS (ref 0–0)
PLATELET # BLD AUTO: 108 X10*3/UL (ref 150–450)
POTASSIUM SERPL-SCNC: 4.1 MMOL/L (ref 3.5–5.3)
PROT SERPL-MCNC: 6.6 G/DL (ref 6.4–8.2)
RBC # BLD AUTO: 2.86 X10*6/UL (ref 4.5–5.9)
SODIUM SERPL-SCNC: 136 MMOL/L (ref 136–145)
WBC # BLD AUTO: 4.7 X10*3/UL (ref 4.4–11.3)

## 2025-05-28 PROCEDURE — RXMED WILLOW AMBULATORY MEDICATION CHARGE

## 2025-05-28 PROCEDURE — 80053 COMPREHEN METABOLIC PANEL: CPT

## 2025-05-28 PROCEDURE — 36589 REMOVAL TUNNELED CV CATH: CPT | Performed by: NURSE PRACTITIONER

## 2025-05-28 PROCEDURE — 2500000002 HC RX 250 W HCPCS SELF ADMINISTERED DRUGS (ALT 637 FOR MEDICARE OP, ALT 636 FOR OP/ED)

## 2025-05-28 PROCEDURE — 99221 1ST HOSP IP/OBS SF/LOW 40: CPT | Performed by: NURSE PRACTITIONER

## 2025-05-28 PROCEDURE — 85025 COMPLETE CBC W/AUTO DIFF WBC: CPT

## 2025-05-28 PROCEDURE — 99233 SBSQ HOSP IP/OBS HIGH 50: CPT | Performed by: STUDENT IN AN ORGANIZED HEALTH CARE EDUCATION/TRAINING PROGRAM

## 2025-05-28 PROCEDURE — 05PY33Z REMOVAL OF INFUSION DEVICE FROM UPPER VEIN, PERCUTANEOUS APPROACH: ICD-10-PCS | Performed by: NURSE PRACTITIONER

## 2025-05-28 PROCEDURE — 2500000001 HC RX 250 WO HCPCS SELF ADMINISTERED DRUGS (ALT 637 FOR MEDICARE OP)

## 2025-05-28 PROCEDURE — 82947 ASSAY GLUCOSE BLOOD QUANT: CPT

## 2025-05-28 PROCEDURE — 2500000004 HC RX 250 GENERAL PHARMACY W/ HCPCS (ALT 636 FOR OP/ED): Performed by: STUDENT IN AN ORGANIZED HEALTH CARE EDUCATION/TRAINING PROGRAM

## 2025-05-28 PROCEDURE — 36415 COLL VENOUS BLD VENIPUNCTURE: CPT

## 2025-05-28 PROCEDURE — 99238 HOSP IP/OBS DSCHRG MGMT 30/<: CPT

## 2025-05-28 PROCEDURE — 2500000004 HC RX 250 GENERAL PHARMACY W/ HCPCS (ALT 636 FOR OP/ED): Mod: JW,TB

## 2025-05-28 RX ORDER — PANTOPRAZOLE SODIUM 40 MG/1
40 TABLET, DELAYED RELEASE ORAL
Qty: 30 TABLET | Refills: 0 | Status: SHIPPED | OUTPATIENT
Start: 2025-05-28

## 2025-05-28 RX ORDER — METOCLOPRAMIDE 5 MG/1
5 TABLET ORAL
Qty: 90 TABLET | Refills: 0 | Status: SHIPPED | OUTPATIENT
Start: 2025-05-28

## 2025-05-28 RX ORDER — PRAVASTATIN SODIUM 10 MG/1
10 TABLET ORAL NIGHTLY
Qty: 30 TABLET | Refills: 0 | Status: SHIPPED | OUTPATIENT
Start: 2025-05-28

## 2025-05-28 RX ORDER — TACROLIMUS 0.5 MG/1
0.5 CAPSULE ORAL DAILY
Qty: 30 CAPSULE | Refills: 11 | Status: SHIPPED | OUTPATIENT
Start: 2025-05-28

## 2025-05-28 RX ADMIN — NIFEDIPINE 90 MG: 90 TABLET, FILM COATED, EXTENDED RELEASE ORAL at 10:28

## 2025-05-28 RX ADMIN — ISOSORBIDE MONONITRATE 30 MG: 30 TABLET, EXTENDED RELEASE ORAL at 10:28

## 2025-05-28 RX ADMIN — ASCORBIC ACID, THIAMINE MONONITRATE,RIBOFLAVIN, NIACINAMIDE, PYRIDOXINE HYDROCHLORIDE, FOLIC ACID, CYANOCOBALAMIN, BIOTIN, CALCIUM PANTOTHENATE, 1 CAPSULE: 100; 1.5; 1.7; 20; 10; 1; 6000; 150000; 5 CAPSULE, LIQUID FILLED ORAL at 10:28

## 2025-05-28 RX ADMIN — CARVEDILOL 37.5 MG: 25 TABLET, FILM COATED ORAL at 10:28

## 2025-05-28 RX ADMIN — PANTOPRAZOLE SODIUM 40 MG: 40 TABLET, DELAYED RELEASE ORAL at 05:52

## 2025-05-28 RX ADMIN — INSULIN LISPRO 1 UNITS: 100 INJECTION, SOLUTION INTRAVENOUS; SUBCUTANEOUS at 15:16

## 2025-05-28 RX ADMIN — PREDNISONE 5 MG: 5 TABLET ORAL at 10:28

## 2025-05-28 RX ADMIN — TACROLIMUS 0.5 MG: 0.5 CAPSULE ORAL at 10:28

## 2025-05-28 RX ADMIN — INSULIN GLARGINE 15 UNITS: 100 INJECTION, SOLUTION SUBCUTANEOUS at 10:29

## 2025-05-28 RX ADMIN — HYDROMORPHONE HYDROCHLORIDE 0.2 MG: 0.5 INJECTION, SOLUTION INTRAMUSCULAR; INTRAVENOUS; SUBCUTANEOUS at 09:30

## 2025-05-28 ASSESSMENT — PAIN - FUNCTIONAL ASSESSMENT: PAIN_FUNCTIONAL_ASSESSMENT: 0-10

## 2025-05-28 ASSESSMENT — PAIN SCALES - GENERAL
PAINLEVEL_OUTOF10: 4
PAINLEVEL_OUTOF10: 2

## 2025-05-28 NOTE — DISCHARGE INSTRUCTIONS
Dear Mr. Bashir,    You were admitted to  from 5/25 to 5/28 after you called because of a positive blood culture. During your stay, you were also found to have mild hyperkalemia, and the transplant renal team saw you; you received one dialysis session.    The transplant infectious disease team evaluated you and determined that no antibiotics were needed, as the blood culture was likely contaminated and you showed no signs of active infection.    Unfortunately, your scheduled MRI of the shoulder was postponed due to your hospitalization, but we will arrange a new appointment for you. Additionally, your tunneled hemodialysis catheter was removed, as you no longer require it.      Medication Changes:    We changed your Tacrolimus dose to 0.5 mg capsul daily and please apply tacrolimus ointment 2 times daily   Please stop taking the following:  Cinacalet( Senspar), Isavuconzonium,  and Imiquimod       Appointments & Follow-up:  We have ordered MRI shoulder for you, please call us back if you didn't receive a call about this, and follow up with orthopedic   - Follow up with kidney doctor   - Follow up with PCP       It was a pleasure taking care of you!    Your  Care Team

## 2025-05-28 NOTE — POST-PROCEDURE NOTE
INTERVENTIONAL RADIOLOGY ADVANCED PRACTICE PROCEDURE  [facility]    The existing skin site over the tunneled catheter insertion site was prepped with antiseptic, dried, and draped with maximal sterile manner.  The skin and subcutaneous tissues at the original catheter site were anesthetized with 1% lidocaine.   Using blunt dissection, the catheter cuff was externalized and subsequently the catheter was completely removed.   Hemostasis was obtained using manual compression at the vein access site.  The incision site was covered with a sterile dressing and tegaderm was subsequently applied.  There were no immediate or post-procedural complications.

## 2025-05-28 NOTE — PROGRESS NOTES
Reason For Consult  Dilaysis, failed allograft    History Of Present Illness  Manolo Bashir is a 69 y.o. male presenting with strep hominis bacteremia and right shoulder pain. He has a history of ESRD s/p renal transplant x2, non-functioning (initially 1992 c/b allograft failure in 2006, 2nd transplant 2013 c/b impaired allograft function), currently on iHD since 05/2024 (Tues/Thurs/Sat), MGUS, T2DM, HTN, prostate cx s/p radical prostatectomy, HCV s/p SVR (treated w/Harvoni), recent C Diff (treated w/ PO vancomycin 02/2025) initially presenting to the ED with positive bloo d culture as noted above.    Subjective: no complaints.  Permcath got removed. C/o sleepiness.     Past Medical History  He has a past medical history of Anemia, Arthritis, Cataract, Chronic kidney disease, stage 3 unspecified (Multi) (09/26/2018), CKD (chronic kidney disease), Cough (02/12/2024), COVID-19 (06/18/2020), Diabetes (Multi), ESRD (end stage renal disease) (Multi), Focal and segmental proliferative glomerulonephritis (12/23/2023), HTN (hypertension), Hyperlipidemia, Other long term (current) drug therapy (07/20/2021), Personal history of other diseases of the circulatory system, Personal history of other infectious and parasitic diseases (08/17/2015), Polyp, colonic (08/17/2023), Primary osteoarthritis of both ankles (08/17/2023), Prostate cancer (Multi), Tubular adenoma of colon (08/17/2023), and Unspecified kidney failure (08/17/2016).    Surgical History  He has a past surgical history that includes Prostatectomy (10/11/2013); Ileostomy (04/25/2017); Other surgical history (04/21/2017); Ileostomy closure (08/17/2015); Other surgical history (08/17/2015); Other surgical history (08/17/2015); US guided percutaneous peritoneal or retroperitoneal fluid collection drainage (10/20/2022); transplant, kidney, open (1992); transplant, kidney, open (2013); and US guided percutaneous biopsy renal left (Left, 11/20/2023).     Social  "History  He reports that he has never smoked. He has been exposed to tobacco smoke. He has never used smokeless tobacco.  Drug: Oxycodone. He reports that he does not drink alcohol.    Family History  Family History[1]     Allergies  Patient has no known allergies.    Review of Systems  Review of  14 systems was performed system by system. See HPI. Otherwise, the symptoms were negative.     Physical Exam  Vital signs - reviewed.   General Appearance - NAD, Good speech, oriented and alert  HEENT - Supple. Not pale. No jaundice.   CVS - RRR. Normal S1/S2. No murmur, click , rub or gallop  Lungs- clear to auscultation bilaterally  Abdomen - soft , not tender, no guarding, no rigidity. No hepatosplenomegaly. Normal bowel sounds. No masses and ascites. S/P Kidney transplant .  Transplanted kidney is not tender.   Musculoskeletal /Extremities - no edema. Full ROM. No joint tenderness.   Neuro/Psych - appropriate mood and affect. Motor power V/V all extremities. CN I -XII were grossly intact.  Skin - No visible rash  Access - LUE AVF       Last Recorded Vitals  Blood pressure 149/64, pulse 70, temperature 36.5 °C (97.7 °F), temperature source Temporal, resp. rate 16, height 1.727 m (5' 7.99\"), weight 65 kg (143 lb 4.8 oz), SpO2 100%.    Relevant Results  Results for orders placed or performed during the hospital encounter of 05/25/25 (from the past 24 hours)   POCT GLUCOSE   Result Value Ref Range    POCT Glucose 207 (H) 74 - 99 mg/dL   Comprehensive metabolic panel   Result Value Ref Range    Glucose 110 (H) 74 - 99 mg/dL    Sodium 136 136 - 145 mmol/L    Potassium 4.1 3.5 - 5.3 mmol/L    Chloride 97 (L) 98 - 107 mmol/L    Bicarbonate 31 21 - 32 mmol/L    Anion Gap 12 10 - 20 mmol/L    Urea Nitrogen 27 (H) 6 - 23 mg/dL    Creatinine 9.15 (H) 0.50 - 1.30 mg/dL    eGFR 6 (L) >60 mL/min/1.73m*2    Calcium 9.3 8.6 - 10.6 mg/dL    Albumin 2.9 (L) 3.4 - 5.0 g/dL    Alkaline Phosphatase 68 33 - 136 U/L    Total Protein 6.6 6.4 - " 8.2 g/dL    AST 13 9 - 39 U/L    Bilirubin, Total 0.4 0.0 - 1.2 mg/dL    ALT 11 10 - 52 U/L   CBC and Auto Differential   Result Value Ref Range    WBC 4.7 4.4 - 11.3 x10*3/uL    nRBC 0.0 0.0 - 0.0 /100 WBCs    RBC 2.86 (L) 4.50 - 5.90 x10*6/uL    Hemoglobin 7.6 (L) 13.5 - 17.5 g/dL    Hematocrit 24.0 (L) 41.0 - 52.0 %    MCV 84 80 - 100 fL    MCH 26.6 26.0 - 34.0 pg    MCHC 31.7 (L) 32.0 - 36.0 g/dL    RDW 14.3 11.5 - 14.5 %    Platelets 108 (L) 150 - 450 x10*3/uL    Neutrophils % 63.1 40.0 - 80.0 %    Immature Granulocytes %, Automated 1.1 (H) 0.0 - 0.9 %    Lymphocytes % 15.9 13.0 - 44.0 %    Monocytes % 14.6 2.0 - 10.0 %    Eosinophils % 4.9 0.0 - 6.0 %    Basophils % 0.4 0.0 - 2.0 %    Neutrophils Absolute 2.97 1.20 - 7.70 x10*3/uL    Immature Granulocytes Absolute, Automated 0.05 0.00 - 0.70 x10*3/uL    Lymphocytes Absolute 0.75 (L) 1.20 - 4.80 x10*3/uL    Monocytes Absolute 0.69 0.10 - 1.00 x10*3/uL    Eosinophils Absolute 0.23 0.00 - 0.70 x10*3/uL    Basophils Absolute 0.02 0.00 - 0.10 x10*3/uL   POCT GLUCOSE   Result Value Ref Range    POCT Glucose 122 (H) 74 - 99 mg/dL   POCT GLUCOSE   Result Value Ref Range    POCT Glucose 156 (H) 74 - 99 mg/dL     *Note: Due to a large number of results and/or encounters for the requested time period, some results have not been displayed. A complete set of results can be found in Results Review.          Assessment/Plan     Manolo Bashir is a 69 y.o. male presenting with strep hominis bacteremia and right shoulder pain. He has a history of ESRD s/p renal transplant x2, non-functioning (initially 1992 c/b allograft failure in 2006, 2nd transplant 2013 c/b impaired allograft function), currently on iHD since 05/2024 (Tues/Thurs/Sat)    ESKD  - no urgent need for HD today  - next HD tomorrow to go back to TTS schedule  - Renal vitamins    Immunosuppression  In the setting of active infection  - TAC 0.5 mg/day  No need to follow FK level  - continue pred 5  mg/day    CKD-MBD  Ca/Phos ok    BP - above goal  Expect improvement with UF  On coreg 37.5 mg BID  Nifedipine 90 mg BID  Imdur 30 mg/day    Infection  BC 5/25 likely contaminant per transplant ID    Heme  Hold IV iron during active infection  DARIANA        Betty Ireland MD         [1]   Family History  Problem Relation Name Age of Onset    Bone cancer Mother      Other (corona's sarcome of the bone marrow) Mother      Prostate cancer Father      Diabetes Other Family Hist     Hypertension Other Family Hist

## 2025-05-28 NOTE — CONSULTS
"Subjective   Interval History: has complaints Shoulder pain. States graft has been working as expected. Dialysis catheter has been in place for about 2-3 months.     Objective   Vital signs in last 24 hours:  /62 (BP Location: Right arm, Patient Position: Lying)   Pulse 75   Temp 36.5 °C (97.7 °F) (Temporal)   Resp 16   Wt 65 kg (143 lb 4.8 oz)   SpO2 100%     Intake/Output last 3 shifts:  I/O last 3 completed shifts:  In: 600 (9.2 mL/kg) [I.V.:200 (3.1 mL/kg); Other:400]  Out: 2400 (36.9 mL/kg) [Other:2400]  Weight: 65 kg   Intake/Output this shift:  No intake/output data recorded.    Physical Exam  Neuro: oriented to person, place, time, and general circumstances  HEENT: normocephalic, atraumatic  Pulm: clear to auscultation bilaterally, no wheezes, good air entry  Cardiac: Regular rate and rhythm or without murmur or extra heart sounds  Abdomen: soft, nontender, normal bowel sounds  Pulses: 2+ and symmetric    Relevant Results  LABS:  Lab Results   Component Value Date    WBC 4.7 05/28/2025    HGB 7.6 (L) 05/28/2025    HCT 24.0 (L) 05/28/2025    MCV 84 05/28/2025     (L) 05/28/2025      Results from last 72 hours   Lab Units 05/28/25  0736   SODIUM mmol/L 136   POTASSIUM mmol/L 4.1   CHLORIDE mmol/L 97*   CO2 mmol/L 31   BUN mg/dL 27*   CREATININE mg/dL 9.15*   GLUCOSE mg/dL 110*   CALCIUM mg/dL 9.3   ANION GAP mmol/L 12   EGFR mL/min/1.73m*2 6*     Results from last 72 hours   Lab Units 05/28/25  0736   ALK PHOS U/L 68   BILIRUBIN TOTAL mg/dL 0.4   PROTEIN TOTAL g/dL 6.6   ALT U/L 11   AST U/L 13   ALBUMIN g/dL 2.9*           No lab exists for component: \"PT\"    MICRO:  Susceptibility data from last 14 days.  Collected Specimen Info Organism   05/25/25 Blood culture from Peripheral Venipuncture Staphylococcus hominis   05/20/25 Blood culture from Peripheral Venipuncture Staphylococcus hominis       IMAGING:  IR CVC removal    (Results Pending)       Assessment/Plan   Tunneled catheter removal. " Please refer to imaging study for further detail.     LOS: 3 days     ZAIRE Milton      I personally spent over half of a total 35 minutes in counseling and discussion with the patient and coordination of care as described above.

## 2025-05-28 NOTE — CARE PLAN
Problem: Pain - Adult  Goal: Verbalizes/displays adequate comfort level or baseline comfort level  Outcome: Progressing     Problem: Safety - Adult  Goal: Free from fall injury  Outcome: Progressing     Problem: Discharge Planning  Goal: Discharge to home or other facility with appropriate resources  Outcome: Progressing     Problem: Chronic Conditions and Co-morbidities  Goal: Patient's chronic conditions and co-morbidity symptoms are monitored and maintained or improved  Outcome: Progressing     Problem: Nutrition  Goal: Nutrient intake appropriate for maintaining nutritional needs  Outcome: Progressing     Problem: Diabetes  Goal: Achieve decreasing blood glucose levels by end of shift  Outcome: Progressing  Goal: Increase stability of blood glucose readings by end of shift  Outcome: Progressing  Goal: Decrease in ketones present in urine by end of shift  Outcome: Progressing  Goal: Maintain electrolyte levels within acceptable range throughout shift  Outcome: Progressing  Goal: Maintain glucose levels >70mg/dl to <250mg/dl throughout shift  Outcome: Progressing  Goal: No changes in neurological exam by end of shift  Outcome: Progressing  Goal: Learn about and adhere to nutrition recommendations by end of shift  Outcome: Progressing  Goal: Vital signs within normal range for age by end of shift  Outcome: Progressing  Goal: Increase self care and/or family involovement by end of shift  Outcome: Progressing  Goal: Receive DSME education by end of shift  Outcome: Progressing     Problem: Pain  Goal: Takes deep breaths with improved pain control throughout the shift  Outcome: Progressing  Goal: Turns in bed with improved pain control throughout the shift  Outcome: Progressing  Goal: Walks with improved pain control throughout the shift  Outcome: Progressing  Goal: Performs ADL's with improved pain control throughout shift  Outcome: Progressing  Goal: Participates in PT with improved pain control throughout the  shift  Outcome: Progressing  Goal: Free from opioid side effects throughout the shift  Outcome: Progressing  Goal: Free from acute confusion related to pain meds throughout the shift  Outcome: Progressing   The patient's goals for the shift include      The clinical goals for the shift include Pt will remain safe from falls this shift

## 2025-05-28 NOTE — DISCHARGE SUMMARY
"Discharge Diagnosis  -Anemia, unspecified type  - Hyperkalemia   -  ESRD and Dialysis            Issues Requiring Follow-Up  Follow up with nephrology for ESRD   Follow up with orthopedic for right shoulder MRI   Follow up with PCP for post-admission visit     Discharge Meds     Medication List      START taking these medications     fluocinonide 0.05 % ointment; Commonly known as: Lidex; Apply to   affected areas twice daily when active as needed. Use less than 14 days   per month.     CHANGE how you take these medications     * tacrolimus 0.1 % ointment; Commonly known as: Protopic; Apply   topically 2 times a day.; What changed: Another medication with the same   name was changed. Make sure you understand how and when to take each.   * tacrolimus 0.5 mg capsule; Commonly known as: Prograf; Take 1 capsule   (0.5 mg) by mouth once daily.; What changed: when to take this  * This list has 2 medication(s) that are the same as other medications   prescribed for you. Read the directions carefully, and ask your doctor or   other care provider to review them with you.     CONTINUE taking these medications     BD Luer-Rita Syringe 3 mL 25 x 5/8\" syringe; Generic drug: syringe with   needle; Use to inject epogen.   carvedilol 12.5 mg tablet; Commonly known as: Coreg; Take 3 tablets   (37.5 mg) by mouth 2 times a day. Hold for systolic BP <140 and HR <60.   PATIENT NEEDS TO FOLLOW UP WITH CARDIOLOGY   clotrimazole 1 % cream; Commonly known as: Lotrimin; Apply topically 2   times a day.   Deep Sea Nasal 0.65 % nasal spray; Generic drug: sodium chloride;   Administer 1 spray into each nostril 4 times a day as needed for   congestion.   Easy Touch Alcohol Prep Pads; Generic drug: alcohol swabs   Gvoke HypoPen 1-Pack 1 mg/0.2 mL auto-injector; Generic drug: glucagon;   Inject 1 mg into the muscle every 15 minutes if needed (For blood glucose   41 to 70 mg/dL and no IV access).   insulin lispro 100 unit/mL pen; Commonly known as: " "HumaLOG; 4 units with   lunch and dinner plus a sliding scale up to 20 units daily   isosorbide mononitrate ER 30 mg 24 hr tablet; Commonly known as: Imdur;   Take 1 tablet (30 mg) by mouth once daily. Do not crush or chew.   Lantus Solostar U-100 Insulin 100 unit/mL (3 mL) pen; Generic drug:   insulin glargine; 20 units daily   metoclopramide 5 mg tablet; Commonly known as: Reglan; Take 1 tablet (5   mg) by mouth 3 times a day before meals.   naloxone 4 mg/0.1 mL nasal spray; Commonly known as: Narcan; Administer   1 spray (4 mg) into affected nostril(s) if needed for opioid reversal. May   repeat every 2-3 minutes if needed, alternating nostrils, until medical   assistance becomes available.   NIFEdipine ER 90 mg 24 hr tablet; Commonly known as: Adalat CC; Take 1   tablet (90 mg) by mouth 2 times a day. Do not crush, chew, or split.   oxyCODONE-acetaminophen 5-325 mg tablet; Commonly known as: Percocet;   Take 1 tablet by mouth every 6 hours if needed for severe pain (7 - 10)   for up to 7 days.   pantoprazole 40 mg EC tablet; Commonly known as: ProtoNix; Take 1 tablet   (40 mg) by mouth once daily in the morning. Take before meals. Do not   crush, chew, or split. Do not start before January 22, 2024.   pravastatin 10 mg tablet; Commonly known as: Pravachol; Take 1 tablet   (10 mg) by mouth once daily at bedtime.   predniSONE 5 mg tablet; Commonly known as: Deltasone; Take 1 tablet (5   mg) by mouth once daily.   sevelamer carbonate 800 mg tablet; Commonly known as: Renvela; Take 1   tablet (800 mg) by mouth 3 times a day before meals. Swallow tablet whole;   do not crush, break, or chew.   True Metrix Glucose Test Strip; Generic drug: blood sugar diagnostic;   For BG check 4x/day   TRUEdraw Lancing Device misc; Generic drug: lancing device; use as   directed   TRUEplus Lancets 33 gauge misc; Generic drug: lancets; 1 each 3 times a   day.   TRUEplus Pen Needle 32 gauge x 5/32\" needle; Generic drug: pen needle, "   diabetic; Use 4 a day as directed   vitamin B complex-vitamin C-folic acid 1 mg capsule; Commonly known as:   Nephrocaps; Take 1 capsule by mouth once daily.     STOP taking these medications     cinacalcet 30 mg tablet; Commonly known as: Sensipar   Cresemba 186 mg capsule capsule; Generic drug: isavuconazonium sulfate   imiquimod 5 % cream; Commonly known as: Aldara       Test Results Pending At Discharge  Pending Labs       Order Current Status    Blood Culture Preliminary result    Blood Culture Preliminary result    Blood Culture Preliminary result    Blood Culture Preliminary result            Hospital Course   Manolo Bashir is a 69 y.o. male  with hx of ESRD s/p renal transplant x2 (initial 1992 c/b allograft failure 2006, 2nd transplant 2013 c/b impaired allograft fxn), currently on iHD since 05/2024 (Tues/Thurs/Sat), MGUS, T2DM, HTN, prostate cx s/p radical prostatectomy, HCV s/p SVR, recent C. diff (treated w/ PO vanc 02/2025), presented on 5/25 after being called back for + blood cx (Staphylococcus hominis 5/20) drawn at prior ED visit  for R shoulder pain.    In the ED, pt received IV vancomycin; labs showed K 5.5, Cr 8.76, Hgb 7.1, WBC 4.8; vitals were stable (afebrile, HR 65, /66, SpO? 99% RA). Given the upcoming holiday weekend and dialysis timing, pt was admitted.    Renal transplant team was consulted and recommended reducing tacrolimus to 0.5 mg daily. Pt received HD on 5/26 for elevated K; K levels improved, and renal recommended stopping Lokelma.    Blood cx from 5/25 grew Staph hominis, likely contaminant; transplant ID was consulted and determined no abx were needed, as pt had no signs of active infection (afebrile, no systemic sx). Transplant ID also reviewed the pt’s recent hx of atypical PNA, for which they had previously recommended a 6-week course of Augmentin and 3 months of isavuconazole (ending June); however, the pt had only completed ~2 weeks but was seen outpatient by ID  (Dr. Preston), who was aware and determined no need to resume antifungal therapy.    Pt’s tunneled HD catheter was removed, as he has been successfully using his AV fistula ; there were no post-procedure complications.    The previously scheduled MRI of the R shoulder (due to prior coracoid process fx and humeral head injury) was postponed during admission but has been rescheduled; ortho was notified.    Pt remained hemodynamically stable throughout hospitalization and was discharged in stable condition.    Pertinent Physical Exam At Time of Discharge  Physical Exam  General: awake, alert, conversant, appears stated age  HEENT: pupils equal and round, no scleral icterus  Skin: no suspect lesions or rashes noted on visible skin  Chest: ctab, normal respiratory effort, not on supplemental oxygen.   Cardiac: regular rate, normal s1, s2, no M/R/G  Abdomen: soft, ND, NT, no involuntary guarding. Midline surgical scar present.  : no flank pain or indwelling urinary catheter  EXT: no peripheral edema, LUE fistula with palpable thrill  MSK: no focal joint swelling noted  Neuro: AOx4, moving all limbs spontaneously, follows commands  Psych: coherent thought process, appropriate mood and affect    Outpatient Follow-Up  Future Appointments   Date Time Provider Department Center   6/2/2025 10:40 AM Chasidy Cabrales, APRN-CNP [id number] [facility]   6/18/2025 10:00 AM Vinicius Araiza MD [id number] [facility]   7/9/2025  8:40 AM Melia Qiu PA-C [id number] [facility]   7/30/2025  1:30 PM Bailey SIMPSON MD [id number] [facility]   8/25/2025 11:00 AM Daxa Cote DPM [id number] [facility]   11/12/2025  9:20 AM Estella Quinonez MD [id number] [facility]   4/6/2026 10:20 AM Cali Mai MD [id number] [facility]   4/29/2026  2:30 PM Elma Hutton APRN-CNP, DNP [id number] [facility]         Kassidy Moran MD  PGY1 Neurology

## 2025-05-29 ENCOUNTER — COMMITTEE REVIEW (OUTPATIENT)
Facility: HOSPITAL | Age: 70
End: 2025-05-29
Payer: COMMERCIAL

## 2025-05-29 ENCOUNTER — DOCUMENTATION (OUTPATIENT)
Facility: HOSPITAL | Age: 70
End: 2025-05-29
Payer: COMMERCIAL

## 2025-05-29 LAB — BACTERIA BLD CULT: NORMAL

## 2025-05-29 NOTE — PROGRESS NOTES
Eval 7/2024.  Patient with multiple ED visits and admissions this year for pulmonary, shoulder fracture, SOB, high K+, and now line infection.  Reviewed with Dr. South.  Close evaluation - will place on consent agenda.

## 2025-05-30 ENCOUNTER — PHARMACY VISIT (OUTPATIENT)
Dept: PHARMACY | Facility: CLINIC | Age: 70
End: 2025-05-30
Payer: COMMERCIAL

## 2025-05-30 ENCOUNTER — DOCUMENTATION (OUTPATIENT)
Dept: TRANSPLANT | Facility: HOSPITAL | Age: 70
End: 2025-05-30
Payer: COMMERCIAL

## 2025-05-30 DIAGNOSIS — R91.8 GROUND GLASS OPACITY PRESENT ON IMAGING OF LUNG: ICD-10-CM

## 2025-05-31 LAB
BACTERIA BLD CULT: NORMAL
BACTERIA BLD CULT: NORMAL

## 2025-05-31 PROCEDURE — RXMED WILLOW AMBULATORY MEDICATION CHARGE

## 2025-06-01 LAB
BACTERIA BLD AEROBE CULT: ABNORMAL
BACTERIA BLD CULT: ABNORMAL
GRAM STN SPEC: ABNORMAL

## 2025-06-02 ENCOUNTER — TELEPHONE (OUTPATIENT)
Facility: HOSPITAL | Age: 70
End: 2025-06-02

## 2025-06-02 ENCOUNTER — PHARMACY VISIT (OUTPATIENT)
Dept: PHARMACY | Facility: CLINIC | Age: 70
End: 2025-06-02
Payer: COMMERCIAL

## 2025-06-02 ENCOUNTER — OFFICE VISIT (OUTPATIENT)
Dept: PULMONOLOGY | Facility: HOSPITAL | Age: 70
End: 2025-06-02
Payer: COMMERCIAL

## 2025-06-02 VITALS
DIASTOLIC BLOOD PRESSURE: 71 MMHG | SYSTOLIC BLOOD PRESSURE: 147 MMHG | OXYGEN SATURATION: 100 % | HEART RATE: 67 BPM | TEMPERATURE: 97.7 F | BODY MASS INDEX: 23.88 KG/M2 | WEIGHT: 157 LBS

## 2025-06-02 DIAGNOSIS — E87.70 HYPERVOLEMIA, UNSPECIFIED HYPERVOLEMIA TYPE: ICD-10-CM

## 2025-06-02 DIAGNOSIS — J90 PLEURAL EFFUSION: Primary | ICD-10-CM

## 2025-06-02 LAB
GLUCOSE BLD MANUAL STRIP-MCNC: 141 MG/DL (ref 74–99)
GLUCOSE BLD MANUAL STRIP-MCNC: 269 MG/DL (ref 74–99)
GLUCOSE BLD MANUAL STRIP-MCNC: 68 MG/DL (ref 74–99)

## 2025-06-02 PROCEDURE — 1125F AMNT PAIN NOTED PAIN PRSNT: CPT | Performed by: NURSE PRACTITIONER

## 2025-06-02 PROCEDURE — 99214 OFFICE O/P EST MOD 30 MIN: CPT | Performed by: NURSE PRACTITIONER

## 2025-06-02 PROCEDURE — 3052F HG A1C>EQUAL 8.0%<EQUAL 9.0%: CPT | Performed by: NURSE PRACTITIONER

## 2025-06-02 PROCEDURE — 3078F DIAST BP <80 MM HG: CPT | Performed by: NURSE PRACTITIONER

## 2025-06-02 PROCEDURE — RXMED WILLOW AMBULATORY MEDICATION CHARGE

## 2025-06-02 PROCEDURE — 1111F DSCHRG MED/CURRENT MED MERGE: CPT | Performed by: NURSE PRACTITIONER

## 2025-06-02 PROCEDURE — 1159F MED LIST DOCD IN RCRD: CPT | Performed by: NURSE PRACTITIONER

## 2025-06-02 PROCEDURE — 3077F SYST BP >= 140 MM HG: CPT | Performed by: NURSE PRACTITIONER

## 2025-06-02 ASSESSMENT — ENCOUNTER SYMPTOMS
DIARRHEA: 0
HEADACHES: 0
RHINORRHEA: 0
VOMITING: 0
ABDOMINAL PAIN: 0
ARTHRALGIAS: 1
AGITATION: 0
PALPITATIONS: 0
WEAKNESS: 0
FEVER: 0
MYALGIAS: 0
VOICE CHANGE: 0
DIZZINESS: 0
NUMBNESS: 0
NAUSEA: 0
FATIGUE: 0
BACK PAIN: 0
JOINT SWELLING: 0
SINUS PRESSURE: 0
EYE PAIN: 0
NERVOUS/ANXIOUS: 0

## 2025-06-02 ASSESSMENT — PAIN SCALES - GENERAL: PAINLEVEL_OUTOF10: 10-WORST PAIN EVER

## 2025-06-02 NOTE — COMMITTEE REVIEW
Evaluation Date: 7/10/2024   Committee Review Date: 5/29/2025   Organ being evaluated for: Kidney     Transplant Phase:  Evaluation   Transplant Status: Active     Referring Physician:     Transplant Physician: Francheska Zhang     Primary Diagnosis: Hypertensive Nephrosclerosis     Committee Members:   Kathie Patel, REY, LD   Jeff Kumar; Ness Calix   Pharmacy Jennifer Swift PharmD   Psychology January Riggs, PhD    Behlke, Sarah, [facility]; Nicole Arias, TODD   Transplant Jackson, Colletta, RN; Rosie Mcqueen RN; Valerie Archer, AYO; Korina Mai, AYO; Kathrine Argueta RN   Transplant Nephrology Raad Rolle MD; Betty Ireland MD   Transplant Surgery Chapo Edwards MD; Geronimo Jimenez MD; Sofia Lara MD; Farzana Chu MD; Humble South MD       Eligibility:  None     Relative contraindications:  None     Absolute contraindications:  None         Committee Review Decision:    The candidate's evaluation was presented and discussed at the Transplant Multidisciplinary Selection Conference. After review of the candidate's diagnosis and the evaluations of the multidisciplinary team members, the committee made the following recommendations:     Close the evaluation due to the reason below. Patient can be re-referred once the condition is resolved.    Other: Recent hospitalizations    Resolution: Close evaluation due to frequent ED visits and hospitalizations.  Pt can return once stable for 6 months.

## 2025-06-02 NOTE — PROGRESS NOTES
Patient: Manolo Bashir    [id number]  : 1955 -- AGE 69 y.o.    Provider: ANÍBAL Muro-CNP     Location [facility]   Service Date: 2025              [facility]  Follow up Visit Note      HISTORY OF PRESENT ILLNESS     The patient's referring provider is: No ref. provider found    HISTORY OF PRESENT ILLNESS   Manolo Bashir is a 69 y.o. male who presents to a [facility] for an evaluation with concerns of Follow-up. I have independently interviewed and examined the patient in the office and reviewed available records.      Current History    On today's visit, the patient - has been admitted several times this spring. He was admitted in 3/2025 - CT with GG - initial concern for pneumonia, but seen by Dr. De Oliveira and thought more fluid overload. Effusions improved with HD. ID recommended isavuconazole (3 months course) and augmentin (6 week course) due to c/f atypical pneumonia.  He had positive blood cultures that were determined to be a contaminant - had HD cath removed since he has been successfully using his AV fistula.     He feels his breathing is ok. He has a slight cough - dry. He denies any wheezing, SOB at rest, CP, or allergies. He has COLLIER- has bad ankles.  He has dialysis TTS. GERD under good control on pantoprazole daily.  He denies any fever/ chills.     Previous pulmonary history: pleural effusions    Inhalers/nebulized medications: none     Hospitalization History: several hospitalizations     Sleep history: He has been told he snores - he wakes up feeling rested.     ALLERGIES AND MEDICATIONS     ALLERGIES  Allergies[1]    MEDICATIONS  Current Medications[2]      PAST HISTORY     PAST MEDICAL HISTORY  - ESRD from hypertension s/p 2 kidney transplants ( and ) that failed in may 2024 now on HD (//S) through [facility])  - MGUS  - T2DM  - HTN   - prostate cancer s/p prostatectomy,  urine incontinence   - Cdiff - s/p PO vanco 2/2025   - pleural effusions - s/p thora 8/2024 and 9/2024   - cholecystectomy     PAST SURGICAL HISTORY  Surgical History[3]    IMMUNIZATION HISTORY  Immunization History   Administered Date(s) Administered    Hep A / Hep B 08/17/2015    Hep A, Unspecified 08/17/2015    Hepatitis A vaccine, age 19 years and greater (HAVRIX) 08/17/2015    Influenza, Unspecified 09/22/2009    Influenza, seasonal, injectable 09/22/2009    Pneumococcal Conjugate PCV 7 1955       SOCIAL HISTORY  Smoking: never  Alcohol: none   Illicit drugs:  none     OCCUPATIONAL/ENVIRONMENTAL HISTORY  Disability - previously worked at [employer] for 10 years. Previously worked as a .     FAMILY HISTORY  FAMILY HISTORY: No family Hx of lung disease or lung cancer.    RESULTS/DATA     Pulmonary Function Test Results     None on record     Chest Radiograph     XR chest 1 view 05/21/2025  Impression  1. Improved bilateral perihilar congestion and pulmonary edema.  Otherwise no acute cardiopulmonary process.  2. Medical devices as detailed above.    No orders to display        Chest CT Scan     Multiple previous     2/24/25 - IMPRESSION:  CHEST:  1. Interval development of bilateral ground-glass opacities, given  patient's history of renal failure, cardiomegaly, and moderate right  and small left pleural effusion these are favored to represent  pulmonary edema rather than multifocal infection however this can not  be ruled out. Repeat scan is recommended in 8-12 weeks to ensure  resolution.  2. The pulmonary artery is dilated measuring 3.5 cm in maximum  diameter, recommend correlation for pulmonary hypertension.  ABDOMEN-PELVIS:  1. Stable appearance of transplant kidney within the left iliac  fossa with edematous appearance and an adjacent fat stranding.  2. There is a decompressed bladder with adjacent fat stranding,  recommend correlation with urinalysis for UTI.    4/4/25 - Interval  increase in size of small to moderate right and small  left pleural effusions with associated bibasilar atelectasis.  Previously seen ground-glass opacities have improved and may have  been due in part to low lung volumes on the prior CT. No new airspace  consolidation.  2. Mildly dilated main pulmonary artery which can be seen with  pulmonary hypertension.  3. Comminuted fracture involving the right coracoid process which  appears chronic.  4. Mild-to-moderate cardiomegaly with multichamber involvement.      Echocardiogram     Echo: 1/28/25 The left ventricular systolic function is normal, with a visually estimated ejection fraction of 55%.  2. Spectral Doppler shows a Grade III (restrictive pattern) of left ventricular diastolic filling with an elevated left atrial pressure.  3. Left ventricular cavity size is moderately dilated.  4. There is normal right ventricular global systolic function.  5. The left atrial size is moderately dilated.  6. Normal aortic root.  7. Compared with study dated 4/5/2024, No significant change on side by side comparison.      Other testing/ Labs      Eosinophils Absolute (x10*3/uL)   Date Value   05/28/2025 0.23   05/27/2025 0.21   05/25/2025 0.11     Eosinophils Absolute, Manual (x10*3/uL)   Date Value   03/24/2025 0.04   03/23/2025 0.02   03/22/2025 0.05     IgE (IU/mL)   Date Value   10/20/2023 48       Respiratory Culture  Lab Results   Component Value Date    RESPCULTSM CANCELED 08/25/2023    GRAMSTAIN Gram positive cocci, clusters (AA) 05/25/2025    GRAMSTAIN (2+) Few Polymorphonuclear leukocytes 05/20/2025    GRAMSTAIN No organisms seen 05/20/2025    GRAMSTAIN SEE NOTE 05/16/2025     Susceptibility data from last 90 days.  Collected Specimen Info Organism   05/25/25 Blood culture from Peripheral Venipuncture Staphylococcus hominis   05/20/25 Blood culture from Peripheral Venipuncture Staphylococcus hominis       Fungal Culture  Lab Results   Component Value Date     FUNGALCULTSM CANCELED 09/19/2023    FUNGALSMEAR CANCELED 09/19/2023       AFB Culture  Lab Results   Component Value Date    AFBCX CANCELED 09/19/2023    AFBSTAIN CANCELED 09/19/2023    AFBSTAIN CANCELED 03/27/2023         REVIEW OF SYSTEMS     REVIEW OF SYSTEMS  Review of Systems   Constitutional:  Negative for fatigue and fever.   HENT:  Negative for congestion, postnasal drip, rhinorrhea, sinus pressure and voice change.    Eyes:  Negative for pain and visual disturbance.   Cardiovascular:  Negative for chest pain, palpitations and leg swelling.   Gastrointestinal:  Negative for abdominal pain, diarrhea, nausea and vomiting.   Endocrine: Negative for cold intolerance and heat intolerance.   Musculoskeletal:  Positive for arthralgias. Negative for back pain, joint swelling and myalgias.   Skin:  Negative for rash.   Neurological:  Negative for dizziness, weakness, numbness and headaches.   Psychiatric/Behavioral:  Negative for agitation. The patient is not nervous/anxious.          PHYSICAL EXAM     VITAL SIGNS: There were no vitals taken for this visit.     CURRENT WEIGHT: [unfilled]  BMI: [unfilled]  PREVIOUS WEIGHTS:  Wt Readings from Last 3 Encounters:   05/25/25 65 kg (143 lb 4.8 oz)   05/21/25 70.3 kg (155 lb)   05/14/25 70.2 kg (154 lb 12.8 oz)       Physical Exam  Vitals reviewed.   Constitutional:       General: He is not in acute distress.     Appearance: Normal appearance. He is not ill-appearing or toxic-appearing.   HENT:      Head: Normocephalic.      Nose: No rhinorrhea.   Cardiovascular:      Rate and Rhythm: Normal rate and regular rhythm.      Heart sounds: Normal heart sounds.   Pulmonary:      Effort: Pulmonary effort is normal. No respiratory distress.      Breath sounds: Normal breath sounds. No stridor. No wheezing, rhonchi or rales.   Abdominal:      General: Abdomen is flat.   Musculoskeletal:         General: Normal range of motion.      Right lower leg: No edema.      Left lower leg: No  edema.   Skin:     General: Skin is warm and dry.      Nails: There is no clubbing.   Neurological:      General: No focal deficit present.      Mental Status: He is alert and oriented to person, place, and time.   Psychiatric:         Mood and Affect: Mood normal.         Behavior: Behavior normal.         Judgment: Judgment normal.         ASSESSMENT/PLAN     Abnormal CT chest: pleural effusion s/p thoracentesis in 8/2024 and 9/2024. Inpatient several times this spring - initial plan for repeat thora, but improved with HD. Case/ imaging reviewed with Dr. De Oliveira who saw him inpatient.   - will get updated CT chest     Thank you for visiting the Pulmonary clinic today!   Return to clinic  3 months after CT chest or sooner if needed   Chasidy Cabrales CNP  My office -  [phone]- Leda is my . Tanya is my nurse [phone].   Radiology scheduling [phone]   Appointment scheduling [phone]   Pulmonary function testing - [phone]               [1] No Known Allergies  [2]   Current Outpatient Medications   Medication Sig Dispense Refill    blood sugar diagnostic (True Metrix Glucose Test Strip) For BG check 4x/day 400 strip 3    carvedilol (Coreg) 12.5 mg tablet Take 3 tablets (37.5 mg) by mouth 2 times a day. Hold for systolic BP <140 and HR <60. PATIENT NEEDS TO FOLLOW UP WITH CARDIOLOGY 90 tablet 3    clotrimazole (Lotrimin) 1 % cream Apply topically 2 times a day. 90 g 0    Easy Touch Alcohol Prep Pads pads, medicated       fluocinonide (Lidex) 0.05 % ointment Apply to affected areas twice daily when active as needed. Use less than 14 days per month. 60 g 3    glucagon (Gvoke HypoPen 1-Pack) 1 mg/0.2 mL auto-injector Inject 1 mg into the muscle every 15 minutes if needed (For blood glucose 41 to 70 mg/dL and no IV access). 0.2 mL 12    insulin glargine (Lantus) 100 unit/mL (3 mL) pen 20 units daily 15 mL 0    insulin lispro (HumaLOG) 100 unit/mL pen 4 units with lunch and  "dinner plus a sliding scale up to 20 units daily 15 mL 6    isosorbide mononitrate ER (Imdur) 30 mg 24 hr tablet Take 1 tablet (30 mg) by mouth once daily. Do not crush or chew. 30 tablet 0    lancets 33 gauge misc 1 each 3 times a day. 100 each 3    lancing device misc use as directed 1 each 0    metoclopramide (Reglan) 5 mg tablet Take 1 tablet (5 mg) by mouth 3 times a day before meals. 90 tablet 0    naloxone (Narcan) 4 mg/0.1 mL nasal spray Administer 1 spray (4 mg) into affected nostril(s) if needed for opioid reversal. May repeat every 2-3 minutes if needed, alternating nostrils, until medical assistance becomes available. 2 each 0    NIFEdipine ER (Adalat CC) 90 mg 24 hr tablet Take 1 tablet (90 mg) by mouth 2 times a day. Do not crush, chew, or split. 180 tablet 3    oxyCODONE-acetaminophen (Percocet) 5-325 mg tablet Take 1 tablet by mouth every 6 hours if needed for severe pain (7 - 10) for up to 7 days. 28 tablet 0    pantoprazole (ProtoNix) 40 mg EC tablet Take 1 tablet (40 mg) by mouth once daily in the morning. Take before meals. Do not crush, chew, or split. Do not start before January 22, 2024. 30 tablet 0    pen needle, diabetic (TechLITE Pen Needle) 32 gauge x 5/32\" needle Use 4 a day as directed 400 each 3    pravastatin (Pravachol) 10 mg tablet Take 1 tablet (10 mg) by mouth once daily at bedtime. 30 tablet 0    predniSONE (Deltasone) 5 mg tablet Take 1 tablet (5 mg) by mouth once daily. 30 tablet 11    sevelamer carbonate (Renvela) 800 mg tablet Take 1 tablet (800 mg) by mouth 3 times a day before meals. Swallow tablet whole; do not crush, break, or chew. 90 tablet 0    sodium chloride (Ocean) 0.65 % nasal spray Administer 1 spray into each nostril 4 times a day as needed for congestion. 44 mL 12    syringe with needle 3 mL 25 x 5/8\" syringe Use to inject epogen. (Patient not taking: Reported on 5/14/2025) 7 each 0    tacrolimus (Prograf) 0.5 mg capsule Take 1 capsule (0.5 mg) by mouth once " daily. 30 capsule 11    tacrolimus (Protopic) 0.1 % ointment Apply topically 2 times a day. 60 g 3    vitamin B complex-vitamin C-folic acid (Nephrocaps) 1 mg capsule Take 1 capsule by mouth once daily. 30 capsule 11     No current facility-administered medications for this visit.   [3]   Past Surgical History:  Procedure Laterality Date    ILEOSTOMY  04/25/2017    Ileostomy    ILEOSTOMY CLOSURE  08/17/2015    Ileostomy Closure    OTHER SURGICAL HISTORY  04/21/2017    Right Hemicolectomy    OTHER SURGICAL HISTORY  08/17/2015    Arteriovenous Surgery Creation Of A-V Fistula    OTHER SURGICAL HISTORY  08/17/2015    Sigmoidoscopy (Fiberoptic, Therapeutic )    PROSTATECTOMY  10/11/2013    Prostatectomy Radical    TRANSPLANT, KIDNEY, OPEN  1992    TRANSPLANT, KIDNEY, OPEN  2013    US GUIDED PERCUTANEOUS BIOPSY RENAL LEFT Left 11/20/2023    US GUIDED PERCUTANEOUS BIOPSY RENAL LEFT 11/20/2023 Lou Rodgers MD [facility] [place] US    US GUIDED PERCUTANEOUS PERITONEAL OR RETROPERITONEAL FLUID COLLECTION DRAINAGE  10/20/2022    US GUIDED PERCUTANEOUS PERITONEAL OR RETROPERITONEAL FLUID COLLECTION DRAINAGE 10/20/2022 [facility] CLINICAL LEGACY

## 2025-06-02 NOTE — PATIENT INSTRUCTIONS
Abnormal CT chest: pleural effusion s/p thoracentesis in 8/2024 and 9/2024. Inpatient several times this spring - initial plan for repeat thora, but improved with HD.   - will get CT chest  [phone]     Thank you for visiting the Pulmonary clinic today!   Return to clinic  3 months after CT chest or sooner if needed   Chasidy Cabrales CNP  My office -  [phone]- Leda is my . Tanya is my nurse [phone].  [phone]   Radiology scheduling  Appointment scheduling [phone]   Pulmonary function testing - [phone]

## 2025-06-03 ENCOUNTER — APPOINTMENT (OUTPATIENT)
Dept: RADIOLOGY | Facility: HOSPITAL | Age: 70
DRG: 564 | End: 2025-06-03
Payer: COMMERCIAL

## 2025-06-03 ENCOUNTER — HOSPITAL ENCOUNTER (INPATIENT)
Facility: HOSPITAL | Age: 70
LOS: 4 days | Discharge: HOME | End: 2025-06-08
Attending: EMERGENCY MEDICINE | Admitting: INTERNAL MEDICINE
Payer: COMMERCIAL

## 2025-06-03 ENCOUNTER — DOCUMENTATION (OUTPATIENT)
Facility: HOSPITAL | Age: 70
End: 2025-06-03
Payer: COMMERCIAL

## 2025-06-03 ENCOUNTER — CLINICAL SUPPORT (OUTPATIENT)
Dept: EMERGENCY MEDICINE | Facility: HOSPITAL | Age: 70
DRG: 564 | End: 2025-06-03
Payer: COMMERCIAL

## 2025-06-03 DIAGNOSIS — E87.70 FLUID OVERLOAD, UNSPECIFIED: ICD-10-CM

## 2025-06-03 DIAGNOSIS — D64.9 CHRONIC ANEMIA: ICD-10-CM

## 2025-06-03 DIAGNOSIS — G89.29 CHRONIC RIGHT SHOULDER PAIN: ICD-10-CM

## 2025-06-03 DIAGNOSIS — M12.811 ROTATOR CUFF ARTHROPATHY OF RIGHT SHOULDER: ICD-10-CM

## 2025-06-03 DIAGNOSIS — N18.4 TYPE 2 DIABETES MELLITUS WITH STAGE 4 CHRONIC KIDNEY DISEASE, WITH LONG-TERM CURRENT USE OF INSULIN (MULTI): ICD-10-CM

## 2025-06-03 DIAGNOSIS — N18.6 ESRD (END STAGE RENAL DISEASE) (MULTI): Primary | ICD-10-CM

## 2025-06-03 DIAGNOSIS — E11.22 TYPE 2 DIABETES MELLITUS WITH STAGE 4 CHRONIC KIDNEY DISEASE, WITH LONG-TERM CURRENT USE OF INSULIN (MULTI): ICD-10-CM

## 2025-06-03 DIAGNOSIS — I73.9 PERIPHERAL VASCULAR DISEASE: ICD-10-CM

## 2025-06-03 DIAGNOSIS — M25.411 EFFUSION OF JOINT OF RIGHT SHOULDER: ICD-10-CM

## 2025-06-03 DIAGNOSIS — M79.89 ARM SWELLING: ICD-10-CM

## 2025-06-03 DIAGNOSIS — Z79.4 TYPE 2 DIABETES MELLITUS WITH STAGE 4 CHRONIC KIDNEY DISEASE, WITH LONG-TERM CURRENT USE OF INSULIN (MULTI): ICD-10-CM

## 2025-06-03 DIAGNOSIS — M25.511 CHRONIC RIGHT SHOULDER PAIN: ICD-10-CM

## 2025-06-03 LAB
ABO GROUP (TYPE) IN BLOOD: NORMAL
ANION GAP SERPL CALC-SCNC: 18 MMOL/L (ref 10–20)
ANTIBODY SCREEN: NORMAL
APTT PPP: 30 SECONDS (ref 26–36)
ATRIAL RATE: 68 BPM
BASOPHILS # BLD AUTO: 0.04 X10*3/UL (ref 0–0.1)
BASOPHILS NFR BLD AUTO: 0.5 %
BUN SERPL-MCNC: 25 MG/DL (ref 6–23)
CALCIUM SERPL-MCNC: 9.4 MG/DL (ref 8.6–10.6)
CHLORIDE SERPL-SCNC: 93 MMOL/L (ref 98–107)
CO2 SERPL-SCNC: 25 MMOL/L (ref 21–32)
CREAT SERPL-MCNC: 6.14 MG/DL (ref 0.5–1.3)
CRP SERPL-MCNC: 1.54 MG/DL
EGFRCR SERPLBLD CKD-EPI 2021: 9 ML/MIN/1.73M*2
EOSINOPHIL # BLD AUTO: 0.27 X10*3/UL (ref 0–0.7)
EOSINOPHIL NFR BLD AUTO: 3.2 %
ERYTHROCYTE [DISTWIDTH] IN BLOOD BY AUTOMATED COUNT: 15.6 % (ref 11.5–14.5)
ERYTHROCYTE [SEDIMENTATION RATE] IN BLOOD BY WESTERGREN METHOD: 32 MM/H (ref 0–20)
GLUCOSE BLD MANUAL STRIP-MCNC: 276 MG/DL (ref 74–99)
GLUCOSE BLD MANUAL STRIP-MCNC: 91 MG/DL (ref 74–99)
GLUCOSE SERPL-MCNC: 241 MG/DL (ref 74–99)
HCT VFR BLD AUTO: 23.7 % (ref 41–52)
HGB BLD-MCNC: 8 G/DL (ref 13.5–17.5)
IMM GRANULOCYTES # BLD AUTO: 0.05 X10*3/UL (ref 0–0.7)
IMM GRANULOCYTES NFR BLD AUTO: 0.6 % (ref 0–0.9)
INR PPP: 1.1 (ref 0.9–1.1)
LYMPHOCYTES # BLD AUTO: 0.86 X10*3/UL (ref 1.2–4.8)
LYMPHOCYTES NFR BLD AUTO: 10.2 %
MAGNESIUM SERPL-MCNC: 1.86 MG/DL (ref 1.6–2.4)
MCH RBC QN AUTO: 27.8 PG (ref 26–34)
MCHC RBC AUTO-ENTMCNC: 33.8 G/DL (ref 32–36)
MCV RBC AUTO: 82 FL (ref 80–100)
MONOCYTES # BLD AUTO: 0.9 X10*3/UL (ref 0.1–1)
MONOCYTES NFR BLD AUTO: 10.7 %
NEUTROPHILS # BLD AUTO: 6.31 X10*3/UL (ref 1.2–7.7)
NEUTROPHILS NFR BLD AUTO: 74.8 %
NRBC BLD-RTO: 0 /100 WBCS (ref 0–0)
P AXIS: 92 DEGREES
P OFFSET: 174 MS
P ONSET: 130 MS
PLATELET # BLD AUTO: 184 X10*3/UL (ref 150–450)
POTASSIUM SERPL-SCNC: 4.6 MMOL/L (ref 3.5–5.3)
PR INTERVAL: 168 MS
PROTHROMBIN TIME: 12.6 SECONDS (ref 9.8–12.4)
Q ONSET: 214 MS
QRS COUNT: 11 BEATS
QRS DURATION: 140 MS
QT INTERVAL: 432 MS
QTC CALCULATION(BAZETT): 459 MS
QTC FREDERICIA: 450 MS
R AXIS: 164 DEGREES
RBC # BLD AUTO: 2.88 X10*6/UL (ref 4.5–5.9)
RH FACTOR (ANTIGEN D): NORMAL
SODIUM SERPL-SCNC: 131 MMOL/L (ref 136–145)
T AXIS: 33 DEGREES
T OFFSET: 430 MS
VENTRICULAR RATE: 68 BPM
WBC # BLD AUTO: 8.4 X10*3/UL (ref 4.4–11.3)

## 2025-06-03 PROCEDURE — 71045 X-RAY EXAM CHEST 1 VIEW: CPT

## 2025-06-03 PROCEDURE — 73223 MRI JOINT UPR EXTR W/O&W/DYE: CPT | Mod: RIGHT SIDE | Performed by: RADIOLOGY

## 2025-06-03 PROCEDURE — 71046 X-RAY EXAM CHEST 2 VIEWS: CPT

## 2025-06-03 PROCEDURE — 2500000002 HC RX 250 W HCPCS SELF ADMINISTERED DRUGS (ALT 637 FOR MEDICARE OP, ALT 636 FOR OP/ED)

## 2025-06-03 PROCEDURE — G0378 HOSPITAL OBSERVATION PER HR: HCPCS

## 2025-06-03 PROCEDURE — 86140 C-REACTIVE PROTEIN: CPT | Performed by: EMERGENCY MEDICINE

## 2025-06-03 PROCEDURE — 85652 RBC SED RATE AUTOMATED: CPT | Performed by: EMERGENCY MEDICINE

## 2025-06-03 PROCEDURE — 86901 BLOOD TYPING SEROLOGIC RH(D): CPT

## 2025-06-03 PROCEDURE — 71045 X-RAY EXAM CHEST 1 VIEW: CPT | Performed by: RADIOLOGY

## 2025-06-03 PROCEDURE — 2500000004 HC RX 250 GENERAL PHARMACY W/ HCPCS (ALT 636 FOR OP/ED): Mod: JZ,TB | Performed by: EMERGENCY MEDICINE

## 2025-06-03 PROCEDURE — 96376 TX/PRO/DX INJ SAME DRUG ADON: CPT

## 2025-06-03 PROCEDURE — 86850 RBC ANTIBODY SCREEN: CPT

## 2025-06-03 PROCEDURE — A9575 INJ GADOTERATE MEGLUMI 0.1ML: HCPCS | Performed by: INTERNAL MEDICINE

## 2025-06-03 PROCEDURE — 99221 1ST HOSP IP/OBS SF/LOW 40: CPT

## 2025-06-03 PROCEDURE — 99285 EMERGENCY DEPT VISIT HI MDM: CPT | Mod: 25 | Performed by: EMERGENCY MEDICINE

## 2025-06-03 PROCEDURE — 73030 X-RAY EXAM OF SHOULDER: CPT | Mod: RT

## 2025-06-03 PROCEDURE — 85610 PROTHROMBIN TIME: CPT

## 2025-06-03 PROCEDURE — 99222 1ST HOSP IP/OBS MODERATE 55: CPT | Performed by: INTERNAL MEDICINE

## 2025-06-03 PROCEDURE — 82947 ASSAY GLUCOSE BLOOD QUANT: CPT

## 2025-06-03 PROCEDURE — 82248 BILIRUBIN DIRECT: CPT

## 2025-06-03 PROCEDURE — 36415 COLL VENOUS BLD VENIPUNCTURE: CPT | Performed by: EMERGENCY MEDICINE

## 2025-06-03 PROCEDURE — 85025 COMPLETE CBC W/AUTO DIFF WBC: CPT | Performed by: EMERGENCY MEDICINE

## 2025-06-03 PROCEDURE — 71046 X-RAY EXAM CHEST 2 VIEWS: CPT | Performed by: RADIOLOGY

## 2025-06-03 PROCEDURE — 2500000001 HC RX 250 WO HCPCS SELF ADMINISTERED DRUGS (ALT 637 FOR MEDICARE OP)

## 2025-06-03 PROCEDURE — 96375 TX/PRO/DX INJ NEW DRUG ADDON: CPT

## 2025-06-03 PROCEDURE — 96374 THER/PROPH/DIAG INJ IV PUSH: CPT

## 2025-06-03 PROCEDURE — 73223 MRI JOINT UPR EXTR W/O&W/DYE: CPT | Mod: RT

## 2025-06-03 PROCEDURE — 99285 EMERGENCY DEPT VISIT HI MDM: CPT | Performed by: EMERGENCY MEDICINE

## 2025-06-03 PROCEDURE — 36415 COLL VENOUS BLD VENIPUNCTURE: CPT

## 2025-06-03 PROCEDURE — 2550000001 HC RX 255 CONTRASTS: Performed by: INTERNAL MEDICINE

## 2025-06-03 PROCEDURE — 73030 X-RAY EXAM OF SHOULDER: CPT | Mod: RIGHT SIDE | Performed by: RADIOLOGY

## 2025-06-03 PROCEDURE — 80053 COMPREHEN METABOLIC PANEL: CPT | Performed by: EMERGENCY MEDICINE

## 2025-06-03 PROCEDURE — 86923 COMPATIBILITY TEST ELECTRIC: CPT

## 2025-06-03 PROCEDURE — 83735 ASSAY OF MAGNESIUM: CPT | Performed by: EMERGENCY MEDICINE

## 2025-06-03 PROCEDURE — 93005 ELECTROCARDIOGRAM TRACING: CPT

## 2025-06-03 RX ORDER — DEXTROSE 50 % IN WATER (D50W) INTRAVENOUS SYRINGE
12.5
Status: DISCONTINUED | OUTPATIENT
Start: 2025-06-03 | End: 2025-06-08 | Stop reason: HOSPADM

## 2025-06-03 RX ORDER — INSULIN GLARGINE 100 [IU]/ML
15 INJECTION, SOLUTION SUBCUTANEOUS NIGHTLY
Status: DISCONTINUED | OUTPATIENT
Start: 2025-06-03 | End: 2025-06-06

## 2025-06-03 RX ORDER — INSULIN LISPRO 100 [IU]/ML
0-10 INJECTION, SOLUTION INTRAVENOUS; SUBCUTANEOUS
Status: DISCONTINUED | OUTPATIENT
Start: 2025-06-03 | End: 2025-06-08 | Stop reason: HOSPADM

## 2025-06-03 RX ORDER — ONDANSETRON HYDROCHLORIDE 2 MG/ML
4 INJECTION, SOLUTION INTRAVENOUS ONCE
Status: COMPLETED | OUTPATIENT
Start: 2025-06-03 | End: 2025-06-03

## 2025-06-03 RX ORDER — GADOTERATE MEGLUMINE 376.9 MG/ML
15 INJECTION INTRAVENOUS
Status: COMPLETED | OUTPATIENT
Start: 2025-06-03 | End: 2025-06-03

## 2025-06-03 RX ORDER — DEXTROSE 50 % IN WATER (D50W) INTRAVENOUS SYRINGE
25
Status: DISCONTINUED | OUTPATIENT
Start: 2025-06-03 | End: 2025-06-08 | Stop reason: HOSPADM

## 2025-06-03 RX ORDER — ACETAMINOPHEN 325 MG/1
975 TABLET ORAL 3 TIMES DAILY
Status: DISCONTINUED | OUTPATIENT
Start: 2025-06-03 | End: 2025-06-08 | Stop reason: HOSPADM

## 2025-06-03 RX ORDER — OXYCODONE HYDROCHLORIDE 5 MG/1
5 TABLET ORAL EVERY 4 HOURS PRN
Refills: 0 | Status: DISCONTINUED | OUTPATIENT
Start: 2025-06-03 | End: 2025-06-05

## 2025-06-03 RX ADMIN — INSULIN LISPRO 6 UNITS: 100 INJECTION, SOLUTION INTRAVENOUS; SUBCUTANEOUS at 16:01

## 2025-06-03 RX ADMIN — ONDANSETRON 4 MG: 2 INJECTION INTRAMUSCULAR; INTRAVENOUS at 10:15

## 2025-06-03 RX ADMIN — HYDROMORPHONE HYDROCHLORIDE 0.5 MG: 1 INJECTION, SOLUTION INTRAMUSCULAR; INTRAVENOUS; SUBCUTANEOUS at 10:51

## 2025-06-03 RX ADMIN — GADOTERATE MEGLUMINE 14 ML: 376.9 INJECTION INTRAVENOUS at 18:02

## 2025-06-03 RX ADMIN — HYDROMORPHONE HYDROCHLORIDE 0.5 MG: 1 INJECTION, SOLUTION INTRAMUSCULAR; INTRAVENOUS; SUBCUTANEOUS at 10:15

## 2025-06-03 RX ADMIN — OXYCODONE 5 MG: 5 TABLET ORAL at 20:05

## 2025-06-03 RX ADMIN — ACETAMINOPHEN 975 MG: 325 TABLET ORAL at 15:12

## 2025-06-03 RX ADMIN — HYDROMORPHONE HYDROCHLORIDE 0.5 MG: 1 INJECTION, SOLUTION INTRAMUSCULAR; INTRAVENOUS; SUBCUTANEOUS at 13:23

## 2025-06-03 RX ADMIN — ACETAMINOPHEN 975 MG: 325 TABLET ORAL at 20:05

## 2025-06-03 RX ADMIN — OXYCODONE 5 MG: 5 TABLET ORAL at 16:09

## 2025-06-03 SDOH — ECONOMIC STABILITY: FOOD INSECURITY: WITHIN THE PAST 12 MONTHS, THE FOOD YOU BOUGHT JUST DIDN'T LAST AND YOU DIDN'T HAVE MONEY TO GET MORE.: NEVER TRUE

## 2025-06-03 SDOH — SOCIAL STABILITY: SOCIAL INSECURITY: ARE YOU OR HAVE YOU BEEN THREATENED OR ABUSED PHYSICALLY, EMOTIONALLY, OR SEXUALLY BY ANYONE?: NO

## 2025-06-03 SDOH — SOCIAL STABILITY: SOCIAL INSECURITY: WITHIN THE LAST YEAR, HAVE YOU BEEN HUMILIATED OR EMOTIONALLY ABUSED IN OTHER WAYS BY YOUR PARTNER OR EX-PARTNER?: NO

## 2025-06-03 SDOH — SOCIAL STABILITY: SOCIAL INSECURITY: DO YOU FEEL UNSAFE GOING BACK TO THE PLACE WHERE YOU ARE LIVING?: NO

## 2025-06-03 SDOH — SOCIAL STABILITY: SOCIAL INSECURITY: ABUSE: ADULT

## 2025-06-03 SDOH — SOCIAL STABILITY: SOCIAL INSECURITY: HAS ANYONE EVER THREATENED TO HURT YOUR FAMILY OR YOUR PETS?: NO

## 2025-06-03 SDOH — SOCIAL STABILITY: SOCIAL INSECURITY: WITHIN THE LAST YEAR, HAVE YOU BEEN AFRAID OF YOUR PARTNER OR EX-PARTNER?: NO

## 2025-06-03 SDOH — ECONOMIC STABILITY: INCOME INSECURITY: IN THE PAST 12 MONTHS HAS THE ELECTRIC, GAS, OIL, OR WATER COMPANY THREATENED TO SHUT OFF SERVICES IN YOUR HOME?: NO

## 2025-06-03 SDOH — ECONOMIC STABILITY: FOOD INSECURITY: WITHIN THE PAST 12 MONTHS, YOU WORRIED THAT YOUR FOOD WOULD RUN OUT BEFORE YOU GOT THE MONEY TO BUY MORE.: NEVER TRUE

## 2025-06-03 SDOH — SOCIAL STABILITY: SOCIAL INSECURITY: DO YOU FEEL ANYONE HAS EXPLOITED OR TAKEN ADVANTAGE OF YOU FINANCIALLY OR OF YOUR PERSONAL PROPERTY?: NO

## 2025-06-03 SDOH — SOCIAL STABILITY: SOCIAL INSECURITY: HAVE YOU HAD THOUGHTS OF HARMING ANYONE ELSE?: NO

## 2025-06-03 SDOH — SOCIAL STABILITY: SOCIAL INSECURITY: ARE THERE ANY APPARENT SIGNS OF INJURIES/BEHAVIORS THAT COULD BE RELATED TO ABUSE/NEGLECT?: NO

## 2025-06-03 SDOH — SOCIAL STABILITY: SOCIAL INSECURITY: DOES ANYONE TRY TO KEEP YOU FROM HAVING/CONTACTING OTHER FRIENDS OR DOING THINGS OUTSIDE YOUR HOME?: NO

## 2025-06-03 SDOH — SOCIAL STABILITY: SOCIAL INSECURITY: HAVE YOU HAD ANY THOUGHTS OF HARMING ANYONE ELSE?: NO

## 2025-06-03 ASSESSMENT — COGNITIVE AND FUNCTIONAL STATUS - GENERAL
WALKING IN HOSPITAL ROOM: A LITTLE
DAILY ACTIVITIY SCORE: 24
MOBILITY SCORE: 24
MOBILITY SCORE: 24
MOBILITY SCORE: 22
PATIENT BASELINE BEDBOUND: NO
DAILY ACTIVITIY SCORE: 24
DAILY ACTIVITIY SCORE: 24
PATIENT BASELINE BEDBOUND: NO
CLIMB 3 TO 5 STEPS WITH RAILING: A LITTLE

## 2025-06-03 ASSESSMENT — LIFESTYLE VARIABLES
HOW OFTEN DO YOU HAVE A DRINK CONTAINING ALCOHOL: NEVER
AUDIT-C TOTAL SCORE: 0
SUBSTANCE_ABUSE_PAST_12_MONTHS: NO
HOW OFTEN DO YOU HAVE 6 OR MORE DRINKS ON ONE OCCASION: NEVER
AUDIT-C TOTAL SCORE: 0
SKIP TO QUESTIONS 9-10: 1
PRESCIPTION_ABUSE_PAST_12_MONTHS: NO
HOW MANY STANDARD DRINKS CONTAINING ALCOHOL DO YOU HAVE ON A TYPICAL DAY: PATIENT DOES NOT DRINK

## 2025-06-03 ASSESSMENT — PAIN - FUNCTIONAL ASSESSMENT
PAIN_FUNCTIONAL_ASSESSMENT: 0-10

## 2025-06-03 ASSESSMENT — ACTIVITIES OF DAILY LIVING (ADL)
FEEDING YOURSELF: INDEPENDENT
GROOMING: INDEPENDENT
JUDGMENT_ADEQUATE_SAFELY_COMPLETE_DAILY_ACTIVITIES: YES
HEARING - LEFT EAR: FUNCTIONAL
LACK_OF_TRANSPORTATION: NO
DRESSING YOURSELF: INDEPENDENT
BATHING: INDEPENDENT
HEARING - RIGHT EAR: FUNCTIONAL
TOILETING: INDEPENDENT
ADEQUATE_TO_COMPLETE_ADL: YES
WALKS IN HOME: INDEPENDENT
PATIENT'S MEMORY ADEQUATE TO SAFELY COMPLETE DAILY ACTIVITIES?: YES

## 2025-06-03 ASSESSMENT — PAIN SCALES - GENERAL
PAINLEVEL_OUTOF10: 0 - NO PAIN
PAINLEVEL_OUTOF10: 10 - WORST POSSIBLE PAIN
PAINLEVEL_OUTOF10: 0 - NO PAIN
PAINLEVEL_OUTOF10: 8
PAINLEVEL_OUTOF10: 0 - NO PAIN
PAINLEVEL_OUTOF10: 10 - WORST POSSIBLE PAIN
PAINLEVEL_OUTOF10: 10 - WORST POSSIBLE PAIN

## 2025-06-03 ASSESSMENT — PAIN DESCRIPTION - DESCRIPTORS: DESCRIPTORS: ACHING

## 2025-06-03 ASSESSMENT — PAIN DESCRIPTION - LOCATION
LOCATION: SHOULDER
LOCATION: SHOULDER

## 2025-06-03 ASSESSMENT — PAIN DESCRIPTION - ORIENTATION
ORIENTATION: RIGHT
ORIENTATION: RIGHT

## 2025-06-03 NOTE — CARE PLAN
The patient's goals for the shift include      The clinical goals for the shift include   Problem: Pain - Adult  Goal: Verbalizes/displays adequate comfort level or baseline comfort level  Outcome: Progressing     Problem: Safety - Adult  Goal: Free from fall injury  Outcome: Progressing     Problem: Discharge Planning  Goal: Discharge to home or other facility with appropriate resources  Outcome: Progressing     Problem: Chronic Conditions and Co-morbidities  Goal: Patient's chronic conditions and co-morbidity symptoms are monitored and maintained or improved  Outcome: Progressing     Problem: Nutrition  Goal: Nutrient intake appropriate for maintaining nutritional needs  Outcome: Progressing     Problem: Fall/Injury  Goal: Not fall by end of shift  Outcome: Progressing  Goal: Be free from injury by end of the shift  Outcome: Progressing  Goal: Verbalize understanding of personal risk factors for fall in the hospital  Outcome: Progressing  Goal: Verbalize understanding of risk factor reduction measures to prevent injury from fall in the home  Outcome: Progressing  Goal: Use assistive devices by end of the shift  Outcome: Progressing  Goal: Pace activities to prevent fatigue by end of the shift  Outcome: Progressing     Problem: Respiratory  Goal: Clear secretions with interventions this shift  Outcome: Progressing  Goal: Minimize anxiety/maximize coping throughout shift  Outcome: Progressing  Goal: Minimal/no exertional discomfort or dyspnea this shift  Outcome: Progressing  Goal: No signs of respiratory distress (eg. Use of accessory muscles. Peds grunting)  Outcome: Progressing  Goal: Patent airway maintained this shift  Outcome: Progressing  Goal: Tolerate mechanical ventilation evidenced by VS/agitation level this shift  Outcome: Progressing  Goal: Tolerate pulmonary toileting this shift  Outcome: Progressing  Goal: Verbalize decreased shortness of breath this shift  Outcome: Progressing  Goal: Wean oxygen to  maintain O2 saturation per order/standard this shift  Outcome: Progressing  Goal: Increase self care and/or family involvement in next 24 hours  Outcome: Progressing     Problem: Skin  Goal: Decreased wound size/increased tissue granulation at next dressing change  Outcome: Progressing  Goal: Participates in plan/prevention/treatment measures  Outcome: Progressing  Flowsheets (Taken 6/3/2025 1851)  Participates in plan/prevention/treatment measures:   Elevate heels   Discuss with provider PT/OT consult   Increase activity/out of bed for meals  Goal: Prevent/manage excess moisture  Outcome: Progressing  Flowsheets (Taken 6/3/2025 1851)  Prevent/manage excess moisture: Moisturize dry skin  Goal: Prevent/minimize sheer/friction injuries  Outcome: Progressing  Flowsheets (Taken 6/3/2025 1851)  Prevent/minimize sheer/friction injuries: Increase activity/out of bed for meals  Goal: Promote/optimize nutrition  Outcome: Progressing  Flowsheets (Taken 6/3/2025 1851)  Promote/optimize nutrition:   Consume > 50% meals/supplements   Monitor/record intake including meals   Offer water/supplements/favorite foods  Goal: Promote skin healing  Outcome: Progressing  Flowsheets (Taken 6/3/2025 1851)  Promote skin healing: Assess skin/pad under line(s)/device(s)

## 2025-06-03 NOTE — ED TRIAGE NOTES
Pt at dialysis today when needing to leave early without finishing full treatment due to Pt complaining of right shoulder pain.  pt has outpt MRI ordered and supposed to go on Friday. Pt increased RR due to pain.     Pt discharged on 5/28 due to following from H&P. 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 Currently, only complains of R shoulder pain previously noted during last presentation, not acutely worsened.

## 2025-06-03 NOTE — CONSULTS
ORTHOPAEDIC SURGERY CONSULT NOTE     HPI:   Orthopaedic Problems/Injuries: Right shoulder effusion    Other Injuries: None    69M (ESRD s/p renal transplant on immunosuppressive c/b failure now on HD - T-Th-Sa, hep C) p/w R shoulder pain since tunneled dialysis catheter placement on 3/25. R shoulder aspirated for 12cc bloody synovial fluid 5/21 w 2.4k WBC, 88% PMNs. No crystals. Cx w no growth. Blood cx w s. hominis, determined to be contaminant by ID. XR/CT w lytic lesions throughout shoulder girdle, poss brown tumors given history of end stage renal disease. Seen by Dr. Masterson outpatient 5/23 w plans for MRI R shoulder.     PMH: per HPI/EMR  PSH: per HPI/EMR  SocHx: Denies alcohol, tobacco, or illicit drug use   Ambulatory Status: Community ambulator without assistive devices   FamHx:  Non-contributory to this patient's acute orthopaedic problem other than as mentioned in HPI  Allergies:   Allergies  Reviewed by Patricia Borrego RN on 6/3/2025   No Known Allergies       Medications: Denies home anticoagulation use   Current Outpatient Medications   Medication Instructions    blood sugar diagnostic (True Metrix Glucose Test Strip) For BG check 4x/day    carvedilol (COREG) 37.5 mg, oral, 2 times daily, Hold for systolic BP <140 and HR <60. PATIENT NEEDS TO FOLLOW UP WITH CARDIOLOGY    clotrimazole (Lotrimin) 1 % cream Topical, 2 times daily    Easy Touch Alcohol Prep Pads pads, medicated     fluocinonide (Lidex) 0.05 % ointment Apply to affected areas twice daily when active as needed. Use less than 14 days per month.    Gvoke HypoPen 1-Pack 1 mg, intramuscular, Every 15 min PRN    insulin glargine (Lantus) 100 unit/mL (3 mL) pen 20 units daily    insulin lispro (HumaLOG) 100 unit/mL pen 4 units with lunch and dinner plus a sliding scale up to 20 units daily    isosorbide mononitrate ER (IMDUR) 30 mg, oral, Daily, Do not crush or chew.    lancets 33 gauge misc 1 each, miscellaneous, 3 times daily    lancing  "device misc use as directed    metoclopramide (REGLAN) 5 mg, oral, 3 times daily before meals    naloxone (NARCAN) 4 mg, nasal, As needed, May repeat every 2-3 minutes if needed, alternating nostrils, until medical assistance becomes available.    NIFEdipine ER (ADALAT CC) 90 mg, oral, 2 times daily, Do not crush, chew, or split.    oxyCODONE-acetaminophen (Percocet) 5-325 mg tablet 1 tablet, oral, Every 6 hours PRN    pantoprazole (ProtoNix) 40 mg EC tablet Take 1 tablet (40 mg) by mouth once daily in the morning. Take before meals. Do not crush, chew, or split. Do not start before January 22, 2024.    pen needle, diabetic (TechLITE Pen Needle) 32 gauge x 5/32\" needle Use 4 a day as directed    pravastatin (PRAVACHOL) 10 mg, oral, Nightly    predniSONE (DELTASONE) 5 mg, oral, Daily    sevelamer carbonate (RENVELA) 800 mg, oral, 3 times daily before meals, Swallow tablet whole; do not crush, break, or chew.    sodium chloride (Ocean) 0.65 % nasal spray 1 spray, Each Nostril, 4 times daily PRN    syringe with needle 3 mL 25 x 5/8\" syringe Use to inject epogen.    tacrolimus (PROGRAF) 0.5 mg, oral, Daily    tacrolimus (Protopic) 0.1 % ointment Topical, 2 times daily    vitamin B complex-vitamin C-folic acid (Nephrocaps) 1 mg capsule 1 capsule, oral, Daily     ROS: 14 point ROS negative except as above    OBJECTIVE:  /60   Pulse 68   Temp 36.9 °C (98.4 °F)   Resp 13   Ht 1.727 m (5' 8\")   Wt 71.2 kg (157 lb)   SpO2 97%   BMI 23.87 kg/m²     PHYSICAL EXAM    Gen: NAD  HEENT: normocephalic atraumatic  Psych: appropriate mood and affect  Resp: nonlabored breathing    Cardiac: extremities WWP    Neuro: alert and oriented   Skin: no rashes    MSK:  Right Upper Extremity:   -Skin in tact  -TTP of right shoulder, large effusion, moderate pain w short arc ROM =  -Fires in AIN/PIN/ulnar nerve distributions   -SILT in axillary/radial/median/ulnar distributions   -Hand warm, well perfused  -Palpable radial " pulse  -Compartments soft and compressible     A full secondary exam was performed and all relevant findings discussed and noted above.    IMAGING:  X- and advanced imaging reveal the following injuries:   - XR/CT w lytic lesions throughout shoulder girdle, poss brown tumors given history of end stage renal disease     ASSESSMENT:   Orthopaedic Problems/Injuries:   - Right shoulder effusion      69M (ESRD s/p renal transplant on immunosuppressive c/b failure now on HD - T-Th-Sa, hep C) p/w R shoulder pain since tunneled dialysis catheter placement on 3/25. R shoulder aspirated for 12cc bloody synovial fluid 5/21 w 2.4k WBC, 88% PMNs. No crystals. Cx w no growth. Blood cx w s. hominis, determined to be contaminant by ID. XR/CT w lytic lesions throughout shoulder girdle, poss brown tumors given history of end stage renal disease. Seen by Dr. Masterson outpatient 5/23 w plans for MRI R shoulder. NVI. Moderate effusion, minimal pain w short arcs. WBC 8.4. ESR 32, CRP 1.54 (9.1 2w prior).      PLAN:  - No acute orthopaedic intervention  - Please obtain MRI of the right shoulder with and without contrast (ordered)_   - NPO at midnight pending Ortho evaluation of MRI right shoulder  - Weight bearing status: WBAT RUE   - Antibiotics: Per primary   - Analgesia per ED/Primary  - Please don't hesitate to page with questions     DISPOSITION: Pending MRI right shoulder     This patient was staffed with the attending physician, Dr. Octavio Venegas MD   Orthopedic Surgery PGY1  [facility], [place]     While admitted, this patient will be followed by the Orthopedic Trauma Team. Please contact the residents listed below with any questions.    For urgent matters at any time, or for any needs between 6 PM and 6 AM Monday through Friday, on weekends, or on holidays, please page the Orthopaedic Surgery resident [place] call at [phone].    Trauma:  First Call: Michael Galicia PGY-1   Second Call: Meño Robert PGY-2  Third  Call: Gato Melara, PGY-3

## 2025-06-03 NOTE — ED PROVIDER NOTES
Emergency Department Provider Note              History of Present Illness     History provided by: Patient  Limitations to History: None  External Records Reviewed with Brief Summary: Charting and labs and imaging from late May hospitalization    HPI:  History of Present Illness  Manolo Bashir is a 69 year old male with end stage renal disease who presents with worsening right shoulder pain.    He has been experiencing right shoulder pain for over three months, which has worsened today. The pain is described as feeling like 'fluid' moving up and down, does not radiate, and is exacerbated by touch. Swelling in the shoulder has been noted, with fluctuations of the pain over time. He has had fluid removed from the shoulder in the past. Denied fever, chills. Denied CP and SOB    He was recently discharged from the hospital on May 28th, where he was admitted for anemia, hyperkalemia, and positive blood cultures. During that hospitalization, he was treated with IV vancomycin for staph hominis, but subsequent cultures were considered contaminants. His right shoulder pain was present during that admission.    He is on dialysis for end stage renal disease, with a schedule of Tuesday, Thursday, and Saturday. Today, he underwent dialysis for two hours instead of the usual three and a half hours due to pain. He has a left upper extremity AV fistula.    He took oxycodone (Percocet) at 6:30 AM before dialysis, but it did not alleviate the pain. No fever, chest pain, or difficulty breathing. He is right-handed and reports no other joint involvement.    Physical Exam   Triage vitals:  T 36.9 °C (98.4 °F)  HR 67  /54  RR 18  O2 100 % None (Room air)    Physical Exam  Patient is obviously uncomfortable, holding his right shoulder stating that he is in acute pain but he is otherwise nontoxic  He is awake alert oriented x 3 to person place and time  Atraumatic head clear nasal passages clear oropharynx dentition  normal  Gaze conjugate  Lungs with mild coarse basilar sounds but excellent air exchange to the bases no distress mildly tachypneic with pain  Cardiac exam with symmetric pulses, particularly radials bilaterally warm well-perfused extremities, regular rate and rhythm, trace jugular venous distention was noted  Abdomen is soft no guarding or rigidity  MSK exam with the following findings  -Left upper extremity with palpable AV fistula with thrill  -Right upper extremity with focal edema to the right shoulder tender to touch particularly at the lateral scapular border limited range of motion due to pain strength otherwise appears intact between the upper and the lower extremities bilaterally though extremely limited with testing of the shoulder.   and elbow function normal and symmetric  -There is no warmth erythema or induration of the right shoulder  -No lower extremity edema  -No auditory visual hallucinations        Medical Decision Making & ED Course   Medical Decision Making:    Results  LABS  Blood cultures: Positive for Staphylococcus hominis (05/25/2025)    DIAGNOSTIC  EKG: Reviewed independently by me, sinus rhythm with a rate of 68, right bundle branch block was appreciated, poor R wave progression through the precordial's with right axis deviation, this is consistent with prior EKGs as the patient has known pulmonary disease compared to EKG from May 2025 no sign of acute ischemic change or new RV strain    PATHOLOGY  Discharge summary: Anemia, hyperkalemia in the setting of end-stage renal disease requiring dialysis, history of failed renal transplant times two (05/28/2025)  Discharge summary: Tunnel HD catheter removed, successful use of left upper extremity AV fistula with no post-procedure complications (05/28/2025)  Discharge summary: Chronic coracoid process fracture with humeral head injury (05/28/2025)     Assessment & Plan  Right shoulder pain with swelling  Right shoulder pain and swelling for  over three months, worsened today. Pain is severe and non-radiating. Swelling fluctuates, possibly due to fluid accumulation. No signs of infection (no redness, warmth, or fever). Differential includes inflammatory causes such as bursitis. Chronic coracoid process fracture with humeral head injury noted. Low suspicion for infection given history and current presentation.  - Administer Dilaudid for pain management  - Order right shoulder x-ray  - Check heart enzyme levels to rule out cardiac issues  - Presumed Chronic coracoid process fracture with humeral head injury    End stage renal disease on dialysis  End stage renal disease requiring dialysis. Dialysis schedule is Tuesday, Thursday, Saturday. Recent dialysis session was cut short due to pain, receiving only two hours instead of the usual three and a half hours. Left upper extremity AV fistula is functioning well with no post-procedure complications.    Anemia in ESRD  Anemia in the context of end stage renal disease. Previously hospitalized for anemia management.    Hyperkalemia in ESRD  Hyperkalemia in the context of end stage renal disease. Previously managed during recent hospitalization.    Failed renal transplants  Two failed renal transplants. Currently managed with dialysis.    Staph hominis bacteremia  Staph hominis bacteremia. Previous blood cultures were positive but later determined to be contaminants. No current signs of infection.  ----      Differential diagnoses considered include but are not limited to: see above    Social Determinants of Health which Significantly Impact Care: Social Determinants of Health which Significantly Impact Care: None identified     EKG Independent Interpretation: EKG interpreted by myself. Please see ED Course for full interpretation.    Independent Result Review and Interpretation: Relevant laboratory and radiographic results were reviewed and independently interpreted by myself.  As necessary, they are commented on  in the ED Course.    Chronic conditions affecting the patient's care: As documented above in MDM    The patient was discussed with the following consultants/services: Transplant medicine    Care Considerations: None    ED Course:  ED Course as of 06/03/25 1308   Tue Jun 03, 2025   1026 On further record review the patient ultimately had negative blood culture x 2 on 5/27.  Denies any fevers and has no clinical evidence of any toxicity here, extremely low suspicion for a septic arthritis [RH]   1122 HEMOGLOBIN(!): 8.0  Stable chronic renal insufficiency baseline 7.5-8.5 likely multifactorial secondary to chronic disease as well as renal insufficiency no bleeding remains hemodynamically stable. [RH]   1135 Sed Rate(!): 32  ESR nonspecifically elevated, consistent with prior ESR levels identical to levels from 2 weeks ago.  In the clinical context there is no overlying erythema induration warmth or evidence of a septic arthritis nor does the patient have any vital sign derangements no tachycardia fever or hypotension. [RH]   1147 C-reactive protein(!)  CRP significantly down trended from prior obtained 2 weeks ago [RH]   1147 Basic metabolic panel(!)  Basic metabolic panel with expected derangements mild hyperglycemia without gap, renal insufficiency consistent with the patient's known history of ESRD. [RH]   1148 XR shoulder right 2+ views  The right shoulder x-ray reveals the chronic coronoid fracture no acute injury, there is an effusion present as expected based off the clinical findings. [RH]   1200 Patient was reevaluated at the bedside patient's pain persists, is slightly milder than when I first saw him.  Reevaluation of the shoulder reveals continued effusion, no overlying erythema no induration he has no fever and vital signs remained stable I still have a low suspicion for primary infection.  Patient had a larger volume arthrocentesis performed which did provide him some relief during his last  hospitalization.  Given my very low clinical suspicion for a septic arthritis given the downtrending inflammatory markers, acute on chronic pain presentation coupled with the patient's active immunosuppression, there is a component of risk around a arthrocentesis.  I will discuss the case with medicine as the patient certainly requires consideration of an arthrocentesis though I think on a nonemergent basis for pain relief as opposed to any diagnostic yield.  I will reach out to transplant medicine and discussed the case  [facility] [RH]      ED Course User Index  [RH] Emile Salazar DO       Final MDM  Patient so far has remained hemodynamically stable no fever no tachycardia he has downtrending-inflammatory markers with negative blood cultures in the setting of what was presumed to be contaminants from his prior hospitalization.  Given all of this, patient seems to have an acute on chronic exacerbation of his pain with a chronic recurrence of a known effusion, I do not have any concerns for septic arthritis, the patient would likely benefit from a nonemergent arthrocentesis that could both be sent for diagnostics as well as larger volume for symptom relief as he has had relief with his arthrocentesis in the past.  I did have a discussion with the medicine team on admission we all agree with deferral on the arthrocentesis at this point I will certainly reconsider should the patient have any change in his clinical presentation    Disposition   As a result of their workup, the patient will require admission to the hospital.  The patient was informed of his diagnosis.  The patient was given the opportunity to ask questions and I answered them. The patient agreed to be admitted to the hospital.    Procedures   Procedures    Patient was seen independently    Emile Salazar DO  Emergency Medicine      This medical note was created with the assistance of artificial intelligence (AI) for documentation purposes. The content  has been reviewed and confirmed by the healthcare provider for accuracy and completeness. Patient consented to the use of audio recording and use of AI during their visit.                                                      Emile Salazar, DO  06/03/25 [id number]

## 2025-06-03 NOTE — H&P
MEDICINE ADMISSION NOTE  Subjective   History of Present Illness  Manolo Bashir is a 69 y.o. male with PMHx of hx of ESRD s/p renal transplant x2 (initial 1992 c/b allograft failure 2006, 2nd transplant 2013 c/b impaired allograft fxn), currently on iHD since 05/2024 (Tues/Thurs/Sat, LUE AV fistula), MGUS, T2DM, HTN, prostate cx s/p radical prostatectomy, HCV s/p SVR HCV PCR negative 2019, presenting with worsening R shoulder pain and effusion that have been persisting for the past three months.    He first saw Dr. Masterson (orthopedics) 5/16 for swollen R shoulder that occurred after dialysis catheter was placed this area, xray showed superior migration humeral head. Aspirations taken at that this outpatient visit had no growth.    He presented to ED 5/20 again for worsening shoulder pain, CT shoulder was concerning for pathologic fracture. At this time, they again performed aspiration and 12 ml of bloody fluid were removed. Blood cultures were obtained, though c/f sepsis was low. MRI was to be completed outpatient.     On 5/25, he was called back as bcx from prior ED visit showed S. Hominis and he was admitted. He remained hemodynamically stable with no signs of active infection, determined S. hominis likely contaminant. Transplant ID was consulted, determined no further antibiotics were needed. His R tunneled HD catheter was removed, as his L AV fistula was fully functional. Hyperkalemia was also managed with HD and Lokelma, he was discharged in stable condition.    During his hospitalization, he was requiring IV pain control. He was given PO Percocet BID at discharge, however he reports minimal improvement in pain. His pain is affecting his ADLs, previously independent at home.    Today, he received dialysis for 2 hours before having to stop due to pain, he usually receives 3.5 hours. Reports no acute changes that precipitated pain, was unable to tolerate during appointment. Upon presentation to the ED, shoulder  X-ray showed subacute fracture through the coracoid process with underlying large glenohumeral joint effusion. He was afebrile, WBC within normal limits, ESR and CRP stable, and there were no signs of infection at the joint (erythema or warmth). He received 1.5 mg of dilaudid which has improved pain. He was admitted for further management of R shoulder effusion and pain control.    ED Course:  BP: 109/54  Temperature: 36.9 °C (98.4 °F)  Heart Rate: 67  Respirations: 18  MAP (mmHg): 88  Pulse Ox: 100 %    ED Interventions:  Medications   glucagon (Glucagen) injection 1 mg (has no administration in time range)   dextrose 50 % injection 25 g (has no administration in time range)   glucagon (Glucagen) injection 1 mg (has no administration in time range)   dextrose 50 % injection 12.5 g (has no administration in time range)   insulin glargine (Lantus) injection 15 Units (has no administration in time range)   insulin lispro injection 0-10 Units (has no administration in time range)   acetaminophen (Tylenol) tablet 975 mg (975 mg oral Given 6/3/25 1512)   oxyCODONE (Roxicodone) immediate release tablet 5 mg (has no administration in time range)   carvedilol (Coreg) tablet 37.5 mg (has no administration in time range)   ondansetron (Zofran) injection 4 mg (4 mg intravenous Given 6/3/25 1015)   HYDROmorphone (Dilaudid) injection 0.5 mg (0.5 mg intravenous Given 6/3/25 1015)   HYDROmorphone (Dilaudid) injection 0.5 mg (0.5 mg intravenous Given 6/3/25 1051)   HYDROmorphone (Dilaudid) injection 0.5 mg (0.5 mg intravenous Given 6/3/25 1323)       Cardiac Hx:  Last echo: No results found for this or any previous visit.   Last EKG:   Encounter Date: 05/25/25   ECG 12 lead   Result Value    Ventricular Rate 65    Atrial Rate 65    NE Interval 182    QRS Duration 142    QT Interval 438    QTC Calculation(Bazett) 455    P Axis 61    R Axis 121    T Axis -15    QRS Count 11    Q Onset 216    P Onset 125    P Offset 166    T Offset  "435    QTC Fredericia 449    Narrative    Normal sinus rhythm  Right axis deviation  Nonspecific intraventricular block  T wave abnormality, consider inferior ischemia  Abnormal ECG  When compared with ECG of 21-MAY-2025 00:23,  Questionable change in QRS axis  Minimal criteria for Anterior infarct are no longer Present  T wave inversion now evident in Inferior leads  T wave inversion now evident in Lateral leads    See ED provider note for full interpretation and clinical correlation  Confirmed by Sil Clemons ([id number]) on 5/25/2025 11:10:28 PM        GI Hx:  Last EGD: No results found for this or any previous visit.   Last Colonoscopy: No results found for this or any previous visit.      Past Medical History  Medical History[1]    Surgical History  Surgical History[2]    Social History  He reports that he has never smoked. He has been exposed to tobacco smoke. He has never used smokeless tobacco.  Drug: Oxycodone. He reports that he does not drink alcohol.    Family History  Family History[3]     Allergies  Patient has no known allergies.         Objective   Current Vitals  /60   Pulse 68   Temp 36.9 °C (98.4 °F)   Resp 13   Ht 1.727 m (5' 8\")   Wt 71.2 kg (157 lb)   SpO2 97%   BMI 23.87 kg/m²      No intake/output data recorded.    Labs:      CBC: WBC 8.4 , HGB 8.0,   BMP: , K 4.6, Cl 93, HCO3 25, BUN 25, CR 6.14, Glu 241  LFTS: AST 13 , ALT 11, ALKPHOS 68 , TBILI 0.4 , DBILI 0.1  TROP: 165  BNP: 2,479  COAGS: PT 12.5 , PTT 29  , INR 1.1  UA:     ABG:    CALCIUM 9.4 MAG No results found for requested labs within last 365 days. ALB 2.9 LACTATE 0.9 PHOS No results found for requested labs within last 365 days. COVIDNo results found for requested labs within last 365 days.    ESR: 32  CRP: 1.54    EKG  Encounter Date: 05/25/25   ECG 12 lead   Result Value    Ventricular Rate 65    Atrial Rate 65    DC Interval 182    QRS Duration 142    QT Interval 438    QTC Calculation(Bazett) 455    P " Axis 61    R Axis 121    T Axis -15    QRS Count 11    Q Onset 216    P Onset 125    P Offset 166    T Offset 435    QTC Fredericia 449    Narrative    Normal sinus rhythm  Right axis deviation  Nonspecific intraventricular block  T wave abnormality, consider inferior ischemia  Abnormal ECG  When compared with ECG of 21-MAY-2025 00:23,  Questionable change in QRS axis  Minimal criteria for Anterior infarct are no longer Present  T wave inversion now evident in Inferior leads  T wave inversion now evident in Lateral leads    See ED provider note for full interpretation and clinical correlation  Confirmed by Sil Clemons ([id number]) on 5/25/2025 11:10:28 PM        Imaging:  Imaging  XR chest 1 view  Result Date: 6/3/2025  Moderate pulmonary edema. Enlarged cardiac silhouette     MACRO: None   Signed by: Brandon Eden 6/3/2025 2:49 PM Dictation workstation:   [id number]    XR shoulder right 2+ views  Result Date: 6/3/2025  Subacute fracture through the coracoid process. Impaction fracture through the humeral head difficult to visualized radiographically. Inferior subluxation of the humeral head compatible with underlying large glenohumeral joint effusion.     MACRO: None   Signed by: Brandon Eden 6/3/2025 11:34 AM Dictation workstation:   [id number]    XR chest 2 views  Result Date: 6/3/2025  1.  No evidence of acute cardiopulmonary process.       MACRO: None   Signed by: Brandon Eden 6/3/2025 11:32 AM Dictation workstation:   [id number]      Cardiology, Vascular, and Other Imaging  No other imaging results found for the past 2 days       Micro/culture data:  Susceptibility data from last 120 days.  Collected Organism   05/25/25 1807 Staphylococcus hominis   05/20/25 2220 Staphylococcus hominis       Physical Exam  Cardiovascular:      Rate and Rhythm: Normal rate and regular rhythm.   Pulmonary:      Effort: Pulmonary effort is normal.      Breath sounds: Normal breath sounds.   Abdominal:      General:  Abdomen is flat. There is no distension.      Palpations: Abdomen is soft.      Tenderness: There is no abdominal tenderness.   Musculoskeletal:      Right lower leg: No edema.      Left lower leg: No edema.      Comments: Right shoulder: large effusion present, moderate pain with lateral raise of shoulder   Skin:     General: Skin is warm and dry.      Comments: Fistula present in LUE   Neurological:      Comments: Drowsy, able to arouse     Medications  Home Meds  Prior to Admission medications    Medication Sig Start Date End Date Taking? Authorizing Provider   blood sugar diagnostic (True Metrix Glucose Test Strip) For BG check 4x/day 5/14/25   Estella Quinonez MD   carvedilol (Coreg) 12.5 mg tablet Take 3 tablets (37.5 mg) by mouth 2 times a day. Hold for systolic BP <140 and HR <60. PATIENT NEEDS TO FOLLOW UP WITH CARDIOLOGY 3/31/25   Cali Mai MD   clotrimazole (Lotrimin) 1 % cream Apply topically 2 times a day. 3/6/25   Constance Rapp MD   Easy Touch Alcohol Prep Pads pads, medicated  1/24/24   Historical Provider, MD   fluocinonide (Lidex) 0.05 % ointment Apply to affected areas twice daily when active as needed. Use less than 14 days per month. 3/17/25   Vinicius Araiza MD   glucagon (Gvoke HypoPen 1-Pack) 1 mg/0.2 mL auto-injector Inject 1 mg into the muscle every 15 minutes if needed (For blood glucose 41 to 70 mg/dL and no IV access). 5/18/24   Don Jain MD   insulin glargine (Lantus) 100 unit/mL (3 mL) pen 20 units daily 5/14/25   Estella Quinonez MD   insulin lispro (HumaLOG) 100 unit/mL pen 4 units with lunch and dinner plus a sliding scale up to 20 units daily 5/14/25   Estella Quinonez MD   isosorbide mononitrate ER (Imdur) 30 mg 24 hr tablet Take 1 tablet (30 mg) by mouth once daily. Do not crush or chew. 4/30/25 6/10/25  Elma Hutton, APRN-CNP, DNP   lancets 33 gauge misc 1 each 3 times a day. 5/14/25   Estella Quinonez MD   lancing device misc use as directed 12/11/24   " Estella Quinonez MD   metoclopramide (Reglan) 5 mg tablet Take 1 tablet (5 mg) by mouth 3 times a day before meals. 5/28/25   Chavez Boyer MD   naloxone (Narcan) 4 mg/0.1 mL nasal spray Administer 1 spray (4 mg) into affected nostril(s) if needed for opioid reversal. May repeat every 2-3 minutes if needed, alternating nostrils, until medical assistance becomes available. 4/30/25   Elma Hutton, APRN-CNP, DNP   NIFEdipine ER (Adalat CC) 90 mg 24 hr tablet Take 1 tablet (90 mg) by mouth 2 times a day. Do not crush, chew, or split. 3/31/25 3/31/26  Cali Mai MD   oxyCODONE-acetaminophen (Percocet) 5-325 mg tablet Take 1 tablet by mouth every 6 hours if needed for severe pain (7 - 10) for up to 7 days. 5/23/25 6/6/25  Spencer Masterson MD   pantoprazole (ProtoNix) 40 mg EC tablet Take 1 tablet (40 mg) by mouth once daily in the morning. Take before meals. Do not crush, chew, or split. Do not start before January 22, 2024. 5/28/25   Chavez Boyer MD   pen needle, diabetic (TechLITE Pen Needle) 32 gauge x 5/32\" needle Use 4 a day as directed 5/14/25   Estella Quinonez MD   pravastatin (Pravachol) 10 mg tablet Take 1 tablet (10 mg) by mouth once daily at bedtime. 5/28/25   Chavez Boyer MD   predniSONE (Deltasone) 5 mg tablet Take 1 tablet (5 mg) by mouth once daily. 4/30/25 4/30/26  Betty Ireland MD   sevelamer carbonate (Renvela) 800 mg tablet Take 1 tablet (800 mg) by mouth 3 times a day before meals. Swallow tablet whole; do not crush, break, or chew. 2/5/25   Patricia Johnson MD   sodium chloride (Ocean) 0.65 % nasal spray Administer 1 spray into each nostril 4 times a day as needed for congestion. 3/6/25   Constance Rapp MD   syringe with needle 3 mL 25 x 5/8\" syringe Use to inject epogen. 4/30/24   Kaycee Arroyo MD   tacrolimus (Prograf) 0.5 mg capsule Take 1 capsule (0.5 mg) by mouth once daily. 5/28/25   Chavez Boyer MD   tacrolimus (Protopic) 0.1 % ointment Apply topically 2 " times a day. 6/24/24 6/24/25  Vinicius Araiza MD   vitamin B complex-vitamin C-folic acid (Nephrocaps) 1 mg capsule Take 1 capsule by mouth once daily. 5/28/25   Chavez Boyer MD   cinacalcet (Sensipar) 30 mg tablet Take 1 tablet (30 mg) by mouth once daily. 1/8/25 5/28/25  Natalia Hancock MD   imiquimod (Aldara) 5 % cream Apply 1 packet topically 3 times a week.  Patient not taking: Reported on 5/14/2025 10/16/24 5/28/25  Shandra Grant MD   isavuconazonium sulfate (Cresemba) 186 mg capsule capsule Take 2 capsules (372 mg) by mouth once daily. Take 2 capsules 3/6 at 10pm and 2 capsules 3/7 6am, then starting 3/8 take 2 capsules once a day  Patient not taking: Reported on 5/14/2025 3/6/25 5/28/25  Constance Rapp MD   metoclopramide (Reglan) 5 mg tablet Take 1 tablet (5 mg) by mouth 3 times a day before meals. 2/24/25 5/28/25  Melia Qiu PA-C   pantoprazole (ProtoNix) 40 mg EC tablet Take 1 tablet (40 mg) by mouth once daily in the morning. Take before meals. Do not crush, chew, or split. Do not start before January 22, 2024. 4/9/24 5/28/25  Raad Rolle MD   pravastatin (Pravachol) 10 mg tablet Take 1 tablet (10 mg) by mouth once daily at bedtime. 12/26/24 5/28/25  Sumeet Bacon MD   tacrolimus (Prograf) 0.5 mg capsule Take 1 capsule (0.5 mg) by mouth 2 times a day. 4/30/25 5/28/25  Betty Ireland MD   vitamin B complex-vitamin C-folic acid (Nephrocaps) 1 mg capsule Take 1 capsule by mouth once daily. 7/3/24 5/28/25  Elma Hutton, APRN-CNP, DNP     Scheduled medications  Scheduled Medications[4]  Continuous medications  Continuous Medications[5]  PRN Medications[6]           Assessment/Plan   Brief summary: This is a 70 yo patient with PMH ESRD s/p 2 transplants currently on iHD Tu/Thu/Sat, HTN, T2DM, remote HCV infection s/p harvoni, and prostate cx s/p radical prostatectomy who presented to the ED for persistent R shoulder pain and large R shoulder joint effusion.     #Acute on chronic  R shoulder pain  #Subacute fracture of coracoid process, atraumatic  #Large glenohumeral joint effusion  - though patient is immunocompromised, low concern for septic arthritis given stable ESR/CRP, no erythema or warmth at joint, previous aspirations show no growth from 5/16 and 5/20  - previous xray 4/30 showed hypertrophic bony changes in AC joint, cystic changes and spurs at humeral head w/o acute fracture or dislocation  - unclear etiology of subacute coracoid process injury (pt denies trauma)  - received 1.5 mg dilaudid in ED that improved pain, however was becoming drowsy  Plan:  - MRI w/wo of shoulder  - Orthopedics consulted  - Pain control:    - tylenol 975 mg Q6   - oxycodone 5 mg Q4 PRN   - dilaudid 0.4 mg Q3 PRN    #ESRD s/p renal transplant x2  #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  -initial transplant 1992 c/b allograft failure 2006, 2nd transplant 2013 c/b impaired allograft fxn  -currently on iHD since 05/2024 Tues/Thurs/Sat  - creatinine 6.14 upon admission  - received only 2 hours of 3.5 of dialysis 6/3 before pain lead him to present to ED  - LUE AV fistula  - hemoglobin 8.0, stable for past two weeks  - home regimen: tacrolimus 0.5 mg, prednisone 5 mg   Plan:  - Consult transplant nephrology regarding need for additional dialysis before Thursday  - Continue tacro + prednisone   - Monitor morning tacro levels daily  - pantoprazole 40 mg OD  - transfuse Hgb <7    #T2DM  - Most recent A1C 8.5, 5/14/25  - Home regimen: 20 units glargine daily, 4 units TID with meals + sliding scale 4-20  Plan:  - 15 units glargine  - SS2, will reevaluate tomorrow for prandial dosage     #HTN  - -140/DBP 50-70  - Home regimen: carvedilol 12.5 mg BID, nifedipine 90 mg BID, imdur 30 mg, pravastatin 10 mg    Fluids: Replete PRN  Electrolytes: Keep mg >2, phos >3  and K >4  Nutrition:  NPO Diet Except: Other (specify); Additional Details: Clear liquids until 0500. May have clears up to 2 hours prior to procedure if exact time is  known.; Effective midnight  Adult diet Renal; Potassium Restricted 2 gm (50mEq); 2 - 3 grams Sodium   Antimicrobials:   DVT PPX: DVT: Unfractionated Heparin  GI ppx: continue home pantoprazole 40 mg BID  Bowel care:Miralax  Catheter:None  Lines:PIV  Oxygen:Room Air  Drips: none     Code Status: Full Code (confirmed on admission)   NOK:  Primary Emergency Contact: KETTY PLASENCIA Home Phone: [phone]  Dispo:     CHARLES BARNES  Internal Medicine Acting Intern, MS5  06/03/25 at 3:21 PM    This note is not finalized until attested by attending physician.          [1]   Past Medical History:  Diagnosis Date    Anemia     Arthritis     Cataract     Chronic kidney disease, stage 3 unspecified (Multi) 09/26/2018    Stage 3 chronic kidney disease    CKD (chronic kidney disease)     stage V    Cough 02/12/2024    COVID-19 06/18/2020    COVID-19 virus infection    Diabetes (Multi)     ESRD (end stage renal disease) (Multi)     Focal and segmental proliferative glomerulonephritis 12/23/2023    HTN (hypertension)     Hyperlipidemia     Other long term (current) drug therapy 07/20/2021    High risk medication use    Personal history of other diseases of the circulatory system     Personal history of cardiac murmur    Personal history of other infectious and parasitic diseases 08/17/2015    History of hepatitis    Polyp, colonic 08/17/2023    Primary osteoarthritis of both ankles 08/17/2023    Prostate cancer (Multi)     Tubular adenoma of colon 08/17/2023    Unspecified kidney failure 08/17/2016    Renal failure   [2]   Past Surgical History:  Procedure Laterality Date    ILEOSTOMY  04/25/2017    Ileostomy    ILEOSTOMY CLOSURE  08/17/2015    Ileostomy Closure    OTHER SURGICAL HISTORY  04/21/2017    Right Hemicolectomy    OTHER SURGICAL HISTORY  08/17/2015    Arteriovenous Surgery Creation Of A-V Fistula    OTHER SURGICAL HISTORY  08/17/2015    Sigmoidoscopy (Fiberoptic, Therapeutic )    PROSTATECTOMY  10/11/2013     Prostatectomy Radical    TRANSPLANT, KIDNEY, OPEN  1992    TRANSPLANT, KIDNEY, OPEN  2013    US GUIDED PERCUTANEOUS BIOPSY RENAL LEFT Left 11/20/2023    US GUIDED PERCUTANEOUS BIOPSY RENAL LEFT 11/20/2023 Lou Rodgers MD [facility]    US GUIDED PERCUTANEOUS PERITONEAL OR RETROPERITONEAL FLUID COLLECTION DRAINAGE  10/20/2022    US GUIDED PERCUTANEOUS PERITONEAL OR RETROPERITONEAL FLUID COLLECTION DRAINAGE 10/20/2022 [facility]ICAL [facility]GAC[facility]   [3]   Family History  Problem Relation Name Age of Onset    Bone cancer Mother      Other (corona's sarcome of the bone marrow) Mother      Prostate cancer Father      Diabetes Other Family Hist     Hypertension Other Family Hist    [4] acetaminophen, 975 mg, oral, TID  [START ON 6/4/2025] carvedilol, 37.5 mg, oral, BID  insulin glargine, 15 Units, subcutaneous, Nightly  insulin lispro, 0-10 Units, subcutaneous, TID AC     [5]    [6] PRN medications: dextrose, dextrose, glucagon, glucagon, oxyCODONE

## 2025-06-04 PROBLEM — N18.6 ESRD (END STAGE RENAL DISEASE) (MULTI): Status: ACTIVE | Noted: 2025-06-04

## 2025-06-04 LAB
ALBUMIN SERPL BCP-MCNC: 2.8 G/DL (ref 3.4–5)
ALBUMIN SERPL BCP-MCNC: 3.4 G/DL (ref 3.4–5)
ALP SERPL-CCNC: 85 U/L (ref 33–136)
ALT SERPL W P-5'-P-CCNC: 15 U/L (ref 10–52)
ANION GAP SERPL CALC-SCNC: 18 MMOL/L (ref 10–20)
AST SERPL W P-5'-P-CCNC: 24 U/L (ref 9–39)
BASOPHILS # BLD AUTO: 0.03 X10*3/UL (ref 0–0.1)
BASOPHILS NFR BLD AUTO: 0.6 %
BILIRUB DIRECT SERPL-MCNC: 0.1 MG/DL (ref 0–0.3)
BILIRUB SERPL-MCNC: 0.7 MG/DL (ref 0–1.2)
BUN SERPL-MCNC: 44 MG/DL (ref 6–23)
CALCIUM SERPL-MCNC: 9.4 MG/DL (ref 8.6–10.6)
CHLORIDE SERPL-SCNC: 95 MMOL/L (ref 98–107)
CO2 SERPL-SCNC: 24 MMOL/L (ref 21–32)
CREAT SERPL-MCNC: 8.66 MG/DL (ref 0.5–1.3)
EGFRCR SERPLBLD CKD-EPI 2021: 6 ML/MIN/1.73M*2
EOSINOPHIL # BLD AUTO: 0.09 X10*3/UL (ref 0–0.7)
EOSINOPHIL NFR BLD AUTO: 1.7 %
ERYTHROCYTE [DISTWIDTH] IN BLOOD BY AUTOMATED COUNT: 16.5 % (ref 11.5–14.5)
GLUCOSE BLD MANUAL STRIP-MCNC: 121 MG/DL (ref 74–99)
GLUCOSE BLD MANUAL STRIP-MCNC: 139 MG/DL (ref 74–99)
GLUCOSE BLD MANUAL STRIP-MCNC: 206 MG/DL (ref 74–99)
GLUCOSE SERPL-MCNC: 138 MG/DL (ref 74–99)
HCT VFR BLD AUTO: 23 % (ref 41–52)
HGB BLD-MCNC: 7 G/DL (ref 13.5–17.5)
IMM GRANULOCYTES # BLD AUTO: 0.03 X10*3/UL (ref 0–0.7)
IMM GRANULOCYTES NFR BLD AUTO: 0.6 % (ref 0–0.9)
LYMPHOCYTES # BLD AUTO: 0.58 X10*3/UL (ref 1.2–4.8)
LYMPHOCYTES NFR BLD AUTO: 10.7 %
MAGNESIUM SERPL-MCNC: 1.89 MG/DL (ref 1.6–2.4)
MCH RBC QN AUTO: 27.1 PG (ref 26–34)
MCHC RBC AUTO-ENTMCNC: 30.4 G/DL (ref 32–36)
MCV RBC AUTO: 89 FL (ref 80–100)
MONOCYTES # BLD AUTO: 0.53 X10*3/UL (ref 0.1–1)
MONOCYTES NFR BLD AUTO: 9.8 %
NEUTROPHILS # BLD AUTO: 4.17 X10*3/UL (ref 1.2–7.7)
NEUTROPHILS NFR BLD AUTO: 76.6 %
NRBC BLD-RTO: 0 /100 WBCS (ref 0–0)
OSMOLALITY SERPL: 294 MOSM/KG (ref 280–300)
PHOSPHATE SERPL-MCNC: 5.3 MG/DL (ref 2.5–4.9)
PLATELET # BLD AUTO: 92 X10*3/UL (ref 150–450)
POTASSIUM SERPL-SCNC: 5.5 MMOL/L (ref 3.5–5.3)
PROT SERPL-MCNC: 7.6 G/DL (ref 6.4–8.2)
RBC # BLD AUTO: 2.58 X10*6/UL (ref 4.5–5.9)
SODIUM SERPL-SCNC: 131 MMOL/L (ref 136–145)
WBC # BLD AUTO: 5.4 X10*3/UL (ref 4.4–11.3)

## 2025-06-04 PROCEDURE — 36415 COLL VENOUS BLD VENIPUNCTURE: CPT

## 2025-06-04 PROCEDURE — 99233 SBSQ HOSP IP/OBS HIGH 50: CPT | Performed by: HOSPITALIST

## 2025-06-04 PROCEDURE — 2500000004 HC RX 250 GENERAL PHARMACY W/ HCPCS (ALT 636 FOR OP/ED)

## 2025-06-04 PROCEDURE — 2500000002 HC RX 250 W HCPCS SELF ADMINISTERED DRUGS (ALT 637 FOR MEDICARE OP, ALT 636 FOR OP/ED)

## 2025-06-04 PROCEDURE — 2500000001 HC RX 250 WO HCPCS SELF ADMINISTERED DRUGS (ALT 637 FOR MEDICARE OP)

## 2025-06-04 PROCEDURE — 83735 ASSAY OF MAGNESIUM: CPT

## 2025-06-04 PROCEDURE — 82947 ASSAY GLUCOSE BLOOD QUANT: CPT

## 2025-06-04 PROCEDURE — 83930 ASSAY OF BLOOD OSMOLALITY: CPT

## 2025-06-04 PROCEDURE — 1100000001 HC PRIVATE ROOM DAILY

## 2025-06-04 PROCEDURE — 80069 RENAL FUNCTION PANEL: CPT

## 2025-06-04 PROCEDURE — 2500000005 HC RX 250 GENERAL PHARMACY W/O HCPCS

## 2025-06-04 PROCEDURE — 85025 COMPLETE CBC W/AUTO DIFF WBC: CPT

## 2025-06-04 PROCEDURE — 5A1D70Z PERFORMANCE OF URINARY FILTRATION, INTERMITTENT, LESS THAN 6 HOURS PER DAY: ICD-10-PCS

## 2025-06-04 PROCEDURE — 99232 SBSQ HOSP IP/OBS MODERATE 35: CPT | Performed by: INTERNAL MEDICINE

## 2025-06-04 RX ORDER — PANTOPRAZOLE SODIUM 40 MG/1
40 TABLET, DELAYED RELEASE ORAL
Status: DISCONTINUED | OUTPATIENT
Start: 2025-06-04 | End: 2025-06-08 | Stop reason: HOSPADM

## 2025-06-04 RX ORDER — OXYCODONE HYDROCHLORIDE 5 MG/1
10 TABLET ORAL EVERY 6 HOURS PRN
Refills: 0 | Status: DISCONTINUED | OUTPATIENT
Start: 2025-06-04 | End: 2025-06-06

## 2025-06-04 RX ORDER — TACROLIMUS 1 MG/G
OINTMENT TOPICAL 2 TIMES DAILY
Status: DISCONTINUED | OUTPATIENT
Start: 2025-06-04 | End: 2025-06-08 | Stop reason: HOSPADM

## 2025-06-04 RX ORDER — PREDNISONE 5 MG/1
5 TABLET ORAL DAILY
Status: DISCONTINUED | OUTPATIENT
Start: 2025-06-04 | End: 2025-06-08 | Stop reason: HOSPADM

## 2025-06-04 RX ORDER — METOCLOPRAMIDE 5 MG/1
5 TABLET ORAL
Status: DISCONTINUED | OUTPATIENT
Start: 2025-06-04 | End: 2025-06-08 | Stop reason: HOSPADM

## 2025-06-04 RX ORDER — POLYETHYLENE GLYCOL 3350 17 G/17G
17 POWDER, FOR SOLUTION ORAL DAILY
Status: DISCONTINUED | OUTPATIENT
Start: 2025-06-04 | End: 2025-06-08 | Stop reason: HOSPADM

## 2025-06-04 RX ORDER — ISOSORBIDE MONONITRATE 30 MG/1
30 TABLET, EXTENDED RELEASE ORAL DAILY
Status: DISCONTINUED | OUTPATIENT
Start: 2025-06-04 | End: 2025-06-08 | Stop reason: HOSPADM

## 2025-06-04 RX ORDER — NIFEDIPINE 90 MG/1
90 TABLET, EXTENDED RELEASE ORAL 2 TIMES DAILY
Status: DISCONTINUED | OUTPATIENT
Start: 2025-06-04 | End: 2025-06-08 | Stop reason: HOSPADM

## 2025-06-04 RX ORDER — TACROLIMUS 0.5 MG/1
0.5 CAPSULE ORAL DAILY
Status: DISCONTINUED | OUTPATIENT
Start: 2025-06-04 | End: 2025-06-08 | Stop reason: HOSPADM

## 2025-06-04 RX ORDER — PRAVASTATIN SODIUM 20 MG/1
10 TABLET ORAL NIGHTLY
Status: DISCONTINUED | OUTPATIENT
Start: 2025-06-04 | End: 2025-06-08 | Stop reason: HOSPADM

## 2025-06-04 RX ORDER — CARVEDILOL 12.5 MG/1
12.5 TABLET ORAL 2 TIMES DAILY
Status: DISCONTINUED | OUTPATIENT
Start: 2025-06-04 | End: 2025-06-04

## 2025-06-04 RX ORDER — LIDOCAINE 560 MG/1
1 PATCH PERCUTANEOUS; TOPICAL; TRANSDERMAL DAILY
Status: DISCONTINUED | OUTPATIENT
Start: 2025-06-04 | End: 2025-06-08 | Stop reason: HOSPADM

## 2025-06-04 RX ORDER — HEPARIN SODIUM 5000 [USP'U]/ML
5000 INJECTION, SOLUTION INTRAVENOUS; SUBCUTANEOUS EVERY 8 HOURS
Status: DISCONTINUED | OUTPATIENT
Start: 2025-06-04 | End: 2025-06-08 | Stop reason: HOSPADM

## 2025-06-04 RX ADMIN — METOCLOPRAMIDE 5 MG: 5 TABLET ORAL at 21:00

## 2025-06-04 RX ADMIN — ACETAMINOPHEN 975 MG: 325 TABLET ORAL at 15:23

## 2025-06-04 RX ADMIN — PRAVASTATIN SODIUM 10 MG: 20 TABLET ORAL at 21:00

## 2025-06-04 RX ADMIN — TACROLIMUS OINTMENT 0.1%: 1 OINTMENT TOPICAL at 20:59

## 2025-06-04 RX ADMIN — CARVEDILOL 37.5 MG: 25 TABLET, FILM COATED ORAL at 21:00

## 2025-06-04 RX ADMIN — TACROLIMUS OINTMENT 0.1%: 1 OINTMENT TOPICAL at 09:48

## 2025-06-04 RX ADMIN — PANTOPRAZOLE SODIUM 40 MG: 40 TABLET, DELAYED RELEASE ORAL at 07:55

## 2025-06-04 RX ADMIN — PREDNISONE 5 MG: 5 TABLET ORAL at 08:20

## 2025-06-04 RX ADMIN — ACETAMINOPHEN 975 MG: 325 TABLET ORAL at 21:01

## 2025-06-04 RX ADMIN — OXYCODONE 10 MG: 5 TABLET ORAL at 22:30

## 2025-06-04 RX ADMIN — ACETAMINOPHEN 975 MG: 325 TABLET ORAL at 08:20

## 2025-06-04 RX ADMIN — LIDOCAINE 1 PATCH: 4 PATCH TOPICAL at 10:00

## 2025-06-04 RX ADMIN — OXYCODONE 5 MG: 5 TABLET ORAL at 03:28

## 2025-06-04 RX ADMIN — INSULIN GLARGINE 15 UNITS: 100 INJECTION, SOLUTION SUBCUTANEOUS at 21:08

## 2025-06-04 RX ADMIN — METOCLOPRAMIDE 5 MG: 5 TABLET ORAL at 09:50

## 2025-06-04 RX ADMIN — TACROLIMUS 0.5 MG: 0.5 CAPSULE ORAL at 09:50

## 2025-06-04 RX ADMIN — ISOSORBIDE MONONITRATE 30 MG: 30 TABLET, EXTENDED RELEASE ORAL at 09:52

## 2025-06-04 RX ADMIN — HYDROMORPHONE HYDROCHLORIDE 0.4 MG: 0.5 INJECTION, SOLUTION INTRAMUSCULAR; INTRAVENOUS; SUBCUTANEOUS at 07:33

## 2025-06-04 RX ADMIN — CARVEDILOL 37.5 MG: 25 TABLET, FILM COATED ORAL at 08:20

## 2025-06-04 RX ADMIN — HYDROMORPHONE HYDROCHLORIDE 0.4 MG: 0.5 INJECTION, SOLUTION INTRAMUSCULAR; INTRAVENOUS; SUBCUTANEOUS at 13:27

## 2025-06-04 RX ADMIN — METOCLOPRAMIDE 5 MG: 5 TABLET ORAL at 15:23

## 2025-06-04 RX ADMIN — NIFEDIPINE 90 MG: 90 TABLET, FILM COATED, EXTENDED RELEASE ORAL at 21:00

## 2025-06-04 RX ADMIN — HEPARIN SODIUM 5000 UNITS: 5000 INJECTION, SOLUTION INTRAVENOUS; SUBCUTANEOUS at 21:45

## 2025-06-04 RX ADMIN — NIFEDIPINE 90 MG: 90 TABLET, FILM COATED, EXTENDED RELEASE ORAL at 09:51

## 2025-06-04 RX ADMIN — ASCORBIC ACID, THIAMINE MONONITRATE,RIBOFLAVIN, NIACINAMIDE, PYRIDOXINE HYDROCHLORIDE, FOLIC ACID, CYANOCOBALAMIN, BIOTIN, CALCIUM PANTOTHENATE, 1 CAPSULE: 100; 1.5; 1.7; 20; 10; 1; 6000; 150000; 5 CAPSULE, LIQUID FILLED ORAL at 08:20

## 2025-06-04 ASSESSMENT — PAIN - FUNCTIONAL ASSESSMENT
PAIN_FUNCTIONAL_ASSESSMENT: 0-10

## 2025-06-04 ASSESSMENT — PAIN SCALES - GENERAL
PAINLEVEL_OUTOF10: 10 - WORST POSSIBLE PAIN
PAINLEVEL_OUTOF10: 9
PAINLEVEL_OUTOF10: 4
PAINLEVEL_OUTOF10: 10 - WORST POSSIBLE PAIN
PAINLEVEL_OUTOF10: 4
PAINLEVEL_OUTOF10: 0 - NO PAIN
PAINLEVEL_OUTOF10: 9

## 2025-06-04 ASSESSMENT — COGNITIVE AND FUNCTIONAL STATUS - GENERAL
CLIMB 3 TO 5 STEPS WITH RAILING: A LITTLE
STANDING UP FROM CHAIR USING ARMS: A LITTLE
CLIMB 3 TO 5 STEPS WITH RAILING: A LITTLE
DAILY ACTIVITIY SCORE: 24
MOBILITY SCORE: 21
DAILY ACTIVITIY SCORE: 24
WALKING IN HOSPITAL ROOM: A LITTLE
MOBILITY SCORE: 23

## 2025-06-04 ASSESSMENT — PAIN DESCRIPTION - ORIENTATION
ORIENTATION: RIGHT

## 2025-06-04 ASSESSMENT — PAIN DESCRIPTION - DESCRIPTORS
DESCRIPTORS: ACHING
DESCRIPTORS: ACHING

## 2025-06-04 ASSESSMENT — PAIN DESCRIPTION - LOCATION
LOCATION: SHOULDER

## 2025-06-04 NOTE — PROGRESS NOTES
"Transplant Nephrology progress note     Date of admission: 6/3/2025     Manolo Bashir is a 69 y.o.  with PMH Medical History[1]     SUBJECTIVE:    Complaining of severe pain in the right shoulder unable to move.      PROBLEM LIST:  Assessment & Plan  ESRD (end stage renal disease) on dialysis (Multi)           ALLERGIES:  Allergies[2]         CURRENT MEDICATIONS:  Scheduled medications  Scheduled Medications[3]  Continuous medications  Continuous Medications[4]  PRN medications  PRN Medications[5]       OBJECTIVE:    VITALS: Visit Vitals  BP (!) 187/75 (Patient Position: Lying) Comment: nurse notified   Pulse 78   Temp 36.6 °C (97.9 °F)   Resp 16   Ht 1.727 m (5' 7.99\")   Wt 68 kg (149 lb 14.6 oz)   SpO2 100%   BMI 22.80 kg/m²   Smoking Status Never   BSA 1.81 m²        General: No distress   Mucosa moist   AI, AC, AF     HEENT: PEERLA  CVS: S1 S2 no murmurs  RESP:  Lungs clear to auscultation   ABDO: Soft, non-tender   Neuro: A + O x 3  Skin: No rash   Extremities: Right upper extremity with a swollen shoulder joint area       LABS:  Results from last 72 hours   Lab Units 06/03/25  1014   WBC AUTO x10*3/uL 8.4   HEMOGLOBIN g/dL 8.0*   MCV fL 82   PLATELETS AUTO x10*3/uL 184   BUN mg/dL 25*   CREATININE mg/dL 6.14*   CALCIUM mg/dL 9.4          No intake or output data in the 24 hours ending 06/04/25 1249       ASSESSMENT AND PLAN:    Manolo Bashir is a 69 y.o. with PMH ESRD s/p DDKT x 2 with failed first graft in 2006 and second kidney transplant in 2013 complicated by failed transplant as of May 2024 currently on Tuesday Thursday Saturday dialysis, MGUS , HTN, T2DM, remote HCV infection s/p harvoni, and prostate cx s/p radical prostatectomy who presented to the ED for persistent R shoulder pain and large R shoulder joint effusion.  Found to have subacute fracture of the coracoid process with full-thickness tearing of subscapularis, long head biceps and glenohumeral joint effusion.    ESRD on dialysis Tuesday " Thursday Saturday:  - Last session yesterday-please repeat labs today to evaluate for any needs for dialysis.  - Current access is a left upper extremity AV fistula which is functioning.  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  No need to check tacrolimus levels.  - Can consider DARIANA for anemia of chronic disease.  - Blood pressures elevated likely due to poor pain control need further pain management.    Thank you for consulting .  Dina Benoit MD       Notes created by Antoine -Please excuse the Typos .                    [1]   Past Medical History:  Diagnosis Date    Anemia     Arthritis     Cataract     Chronic kidney disease, stage 3 unspecified (Multi) 09/26/2018    Stage 3 chronic kidney disease    CKD (chronic kidney disease)     stage V    Cough 02/12/2024    COVID-19 06/18/2020    COVID-19 virus infection    Diabetes (Multi)     ESRD (end stage renal disease) (Multi)     Focal and segmental proliferative glomerulonephritis 12/23/2023    HTN (hypertension)     Hyperlipidemia     Other long term (current) drug therapy 07/20/2021    High risk medication use    Personal history of other diseases of the circulatory system     Personal history of cardiac murmur    Personal history of other infectious and parasitic diseases 08/17/2015    History of hepatitis    Polyp, colonic 08/17/2023    Primary osteoarthritis of both ankles 08/17/2023    Prostate cancer (Multi)     Tubular adenoma of colon 08/17/2023    Unspecified kidney failure 08/17/2016    Renal failure   [2] No Known Allergies  [3] acetaminophen, 975 mg, oral, TID  carvedilol, 37.5 mg, oral, BID  [Held by provider] heparin (porcine), 5,000 Units, subcutaneous, q8h  insulin glargine, 15 Units, subcutaneous, Nightly  insulin lispro, 0-10 Units, subcutaneous, TID AC  isosorbide mononitrate ER, 30 mg, oral, Daily  lidocaine, 1 patch, transdermal, Daily  metoclopramide, 5 mg, oral, TID AC  NIFEdipine  ER, 90 mg, oral, BID  pantoprazole, 40 mg, oral, Daily before breakfast  polyethylene glycol, 17 g, oral, Daily  pravastatin, 10 mg, oral, Nightly  predniSONE, 5 mg, oral, Daily  tacrolimus, 0.5 mg, oral, Daily  tacrolimus, , Topical, BID  vitamin B complex-vitamin C-folic acid, 1 capsule, oral, Daily  [4]    [5] PRN medications: dextrose, dextrose, glucagon, glucagon, HYDROmorphone, oxyCODONE, sodium chloride

## 2025-06-04 NOTE — CARE PLAN
The patient's goals for the shift include Patient will have no c/o of pain or rate pain <4 this shift    The clinical goals for the shift include   Problem: Pain - Adult  Goal: Verbalizes/displays adequate comfort level or baseline comfort level  Outcome: Progressing     Problem: Safety - Adult  Goal: Free from fall injury  Outcome: Progressing     Problem: Discharge Planning  Goal: Discharge to home or other facility with appropriate resources  Outcome: Progressing     Problem: Chronic Conditions and Co-morbidities  Goal: Patient's chronic conditions and co-morbidity symptoms are monitored and maintained or improved  Outcome: Progressing     Problem: Nutrition  Goal: Nutrient intake appropriate for maintaining nutritional needs  Outcome: Progressing     Problem: Fall/Injury  Goal: Not fall by end of shift  Outcome: Progressing  Goal: Be free from injury by end of the shift  Outcome: Progressing  Goal: Verbalize understanding of personal risk factors for fall in the hospital  Outcome: Progressing  Goal: Verbalize understanding of risk factor reduction measures to prevent injury from fall in the home  Outcome: Progressing  Goal: Use assistive devices by end of the shift  Outcome: Progressing  Goal: Pace activities to prevent fatigue by end of the shift  Outcome: Progressing     Problem: Respiratory  Goal: Clear secretions with interventions this shift  Outcome: Progressing  Goal: Minimize anxiety/maximize coping throughout shift  Outcome: Progressing  Goal: Minimal/no exertional discomfort or dyspnea this shift  Outcome: Progressing  Goal: No signs of respiratory distress (eg. Use of accessory muscles. Peds grunting)  Outcome: Progressing  Goal: Patent airway maintained this shift  Outcome: Progressing  Goal: Tolerate mechanical ventilation evidenced by VS/agitation level this shift  Outcome: Progressing  Goal: Tolerate pulmonary toileting this shift  Outcome: Progressing  Goal: Verbalize decreased shortness of  breath this shift  Outcome: Progressing  Goal: Wean oxygen to maintain O2 saturation per order/standard this shift  Outcome: Progressing  Goal: Increase self care and/or family involvement in next 24 hours  Outcome: Progressing     Problem: Skin  Goal: Decreased wound size/increased tissue granulation at next dressing change  Outcome: Progressing  Goal: Participates in plan/prevention/treatment measures  Outcome: Progressing  Flowsheets (Taken 6/4/2025 1538)  Participates in plan/prevention/treatment measures:   Discuss with provider PT/OT consult   Increase activity/out of bed for meals   Elevate heels  Goal: Prevent/manage excess moisture  Outcome: Progressing  Flowsheets (Taken 6/4/2025 1538)  Prevent/manage excess moisture: Moisturize dry skin  Goal: Prevent/minimize sheer/friction injuries  Outcome: Progressing  Flowsheets (Taken 6/4/2025 1538)  Prevent/minimize sheer/friction injuries: Increase activity/out of bed for meals  Goal: Promote/optimize nutrition  Outcome: Progressing  Flowsheets (Taken 6/4/2025 1538)  Promote/optimize nutrition:   Consume > 50% meals/supplements   Monitor/record intake including meals   Offer water/supplements/favorite foods  Goal: Promote skin healing  Outcome: Progressing  Flowsheets (Taken 6/4/2025 1538)  Promote skin healing: Assess skin/pad under line(s)/device(s)

## 2025-06-04 NOTE — DISCHARGE INSTRUCTIONS
Dear Manolo Bashir,    You were admitted to [facility] from the emergency room for R shoulder pain. You were given pain medication, and an MRI was obtained that showed a fracture in your shoulder, tear of your rotator cuff, and tear in your bicep tendon. Orthopedic surgery determined that you did not need emergent surgery in the hospital, but would need close follow up when you are discharged. You also received dialysis while in the hospital and were given a blood cell transfusion for anemia (low red blood cells). We started a new medication, darbepoetin aida (Aranesp) to help with your anemia, you will take it every two weeks. You were discharged with follow-up with Dr. Monte in orthopedics on June 20th (see below for your appointments).     Medication changes:  -START:   -Lidocaine patch- you may use 1 patch over 12 hours for your shoulder pain. You may use one a day   -Start Pregabalin (Lyrica) 25mg every night, this will help with your pain   -Start Miralax, once a day. Many pain medications can cause constipation, this will help prevent that  -CHANGED   -Oxycodone-acetaminophen (Percocet): We increased your dose of Percocet to 7.5mg-325mg, you may take 1 tablet every 6 hours for severe pain      Follow up appointments:  -We are placed a referral for you to see the Pain Management team as an outpatient, please be on the lookout for their call. Please see your scheduled appointments below:  Primary care: It is recommended that you follow up with your primary care provider once discharged. Please make sure to schedule this appointment through [employer] at a location and time that works for you. We recommend being seen within 2 weeks of discharge from the hospital. If you prefer to speak with someone to help you schedule this appointment, you may call  Central Scheduling at [phone]. Please bring with you to the appointment a photo ID and insurance card. Please also bring a list of all of the medications that you  are currently taking to this appointment as well so these can be reviewed.   Orthopedics: 6/20/2025 at 10:45am with Dr. Monte  [address] [facility] [address]  [facility] [address]   Dermatology: 6/18/2025 at 10:00am with Dr. Araiza  [address]  [place] [address]  Gastroenterology: 7/9/2025 at 8:40am with Melia Qiu PA-C  [address] [address]  Sleep Medicine: 7/30/2025 at 1:30pm with Dr. Vance  [address] [address]  [facility]: 8[facility]25[facility]2025 [facility]00[facility]  [address] [facility] [address]  [place] [address]  Pulmonology: 9/8/2025 at 1:40pm with Chasidy Cabrales CNP  [facility]  [address] [address] [address]  Endocrinology: 11/12/2025 at 9:20am with Dr. Quinonez  [address]  [place] [address]    It was a pleasure taking care of you,    Your  Care Team

## 2025-06-04 NOTE — PROGRESS NOTES
"Subjective     Manolo Bashir is a 69 y.o. male on day 1 of admission presenting with R shoulder pain and large R shoulder effusion. 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    Interval history  -EMMA, hemodynamically stable, ,afebrile  -Did not use PRN oxy overnight, in severe pain this morning (10/10) that has improved somewhat with morning dose of dilaudid    Objective   Current Vitals  /55 (BP Location: Right leg, Patient Position: Lying)   Pulse 90   Temp 36.7 °C (98.1 °F) (Temporal)   Resp 16   Ht 1.727 m (5' 7.99\")   Wt 68 kg (149 lb 14.6 oz)   SpO2 92%   BMI 22.80 kg/m²      No intake/output data recorded.    Physical Exam  Constitutional:       Comments: Appear uncomfortable in bed from pain   Cardiovascular:      Rate and Rhythm: Normal rate and regular rhythm.      Heart sounds: Normal heart sounds. No murmur heard.  Pulmonary:      Effort: Pulmonary effort is normal.      Breath sounds: Normal breath sounds.   Abdominal:      General: Abdomen is flat. There is no distension.      Palpations: Abdomen is soft.      Tenderness: There is no abdominal tenderness.   Musculoskeletal:         General: Swelling and deformity present.      Comments: Right shoulder: large effusion present, moderate pain with lateral raise of shoulder    Skin:     General: Skin is warm and dry.      Comments: Fistula present in LUE    Neurological:      Mental Status: He is alert.       Relevant Results  Labs:  CBC: WBC 8.4 , HGB 8.0,   BMP: , K 4.6, Cl 93, HCO3 25, BUN 25, CR 6.14, Glu 241  LFTS: AST 13 , ALT 11, ALKPHOS 68 , TBILI 0.4 , DBILI 0.1  TROP: 165  BNP: 2,479  COAGS: PT 12.6 , PTT 30  , INR 1.1  CALCIUM 9.4   MAG No results found for requested labs " within last 365 days. ALB 2.9 LACTATE 0.9 PHOS No results found for requested labs within last 365 days. COVIDNo results found for requested labs within last 365 days.    Micro/culture data:  Susceptibility data from last 120 days.  Collected Organism   05/25/25 1807 Staphylococcus hominis   05/20/25 2220 Staphylococcus hominis     Imaging:  ECG 12 lead  Suspect arm lead reversal, interpretation assumes no reversal  Normal sinus rhythm  Right axis deviation  Nonspecific intraventricular block  Minimal voltage criteria for LVH, may be normal variant ( Daphne product )  Abnormal ECG  When compared with ECG of 25-MAY-2025 21:29,  T wave inversion no longer evident in Inferior leads  T wave inversion no longer evident in Lateral leads    MR shoulder right w and wo IV contrast  Impression: 1. Displaced fracture of the coracoid process with smooth, scalloped  fracture margins and no evidence of a marrow-replacing lesion, making  a pathologic fracture unlikely. Mild underlying cortical scalloping  is likely chronic and may relate to a longstanding large right  shoulder joint effusion, which demonstrates heterogeneous T2  hypointense signal, suggestive of synovitis with hemosiderin  deposition.  2. Massive rotator cuff tearing involving the entire supraspinatus,  infraspinatus and superior subscapularis fibers.  3. Mild supraspinatus muscle atrophy with moderate edema involving  the supraspinatus, infraspinatus, and subscapularis muscles.  Additional feathery edema pattern within the visualized shoulder  musculature, suggestive of myositis or muscle strain. Recommend  clinical correlation.  4. The long head biceps tendon is torn and retracted with  superimposed tendinosis of the extra-articular long head biceps  tendon.  5. Severe glenohumeral joint osteoarthrosis.      XR shoulder right 2+ views  FINDINGS:  Right shoulder, three views      Subacute fracture through the coracoid process is again seen with  sclerosis. There  is no change in alignment. Previously seen impaction  fracture in the humeral head difficult to visualize radiographically.  Note is made of an unfused os acromiale. There is a and inferior  subluxation of the humeral head suggestive of underlying glenohumeral  joint effusion.      Impression: Subacute fracture through the coracoid process. Impaction fracture  through the humeral head difficult to visualized radiographically.  Inferior subluxation of the humeral head compatible with underlying  large glenohumeral joint effusion.    XR chest 2 views  XR CHEST 2 VIEWS;  6/3/2025 11:23 am    FINDINGS:  CARDIOMEDIASTINAL SILHOUETTE:  Cardiomediastinal silhouette is mildly enlarged.      LUNGS:  There is no consolidation or effusion. There is no edema      ABDOMEN:  No remarkable upper abdominal findings.      BONES:  No acute osseous changes.      Impression: 1.  No evidence of acute cardiopulmonary process.      MEDS:  Scheduled medications  Scheduled Medications[1]  Continuous medications  Continuous Medications[2]  PRN medications  PRN Medications[3]       Assessment/Plan     Brief summary: This is a 68 yo patient with PMH ESRD s/p 2 transplants currently on iHD Tu/Thu/Sat, HTN, T2DM, remote HCV infection s/p harvoni, and prostate cx s/p radical prostatectomy who presented to the ED for persistent R shoulder pain and large R shoulder joint effusion.      #Acute on chronic R shoulder pain  #Subacute fracture of coracoid process, atraumatic  #Full-thickness tearing of subscapularis   #Long head biceps tendon tear  #Large glenohumeral joint effusion  - though patient is immunocompromised, low concern for septic arthritis given stable ESR/CRP, no erythema or warmth at joint, previous aspirations show no growth from 5/16 and 5/20  - previous xray 4/30 showed hypertrophic bony and cystic changes, spurs at humeral head w/o acute fracture or dislocation  - unclear etiology of subacute coracoid process injury (pt denies  trauma)  - MRI 6/3: displaced coracoid process fracture (w/o evidence of pathological fracture), massive rotator cuff tear, biceps tendon rupture, and severe glenohumeral osteoarthritis  - ddx for atraumatic fracture and degeneration: chronic degenerative arthropathy vs. beta-2 microglobulin amyloid arthropathy vs. 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  -initial transplant 1992 c/b allograft failure 2006, 2nd transplant 2013 c/b impaired allograft fxn  -currently on iHD since 05/2024 Tues/Thurs/Sat  - creatinine 6.14 upon admission  - received only 2 hours of 3.5 of dialysis 6/3 before pain lead him to present to ED  - LUE AV fistula  - hemoglobin 8.0, stable for past two weeks  - home regimen: tacrolimus 0.5 mg, prednisone 5 mg   Plan:  - Consult transplant nephrology   - Continue tacro + prednisone   - Monitor morning tacro levels daily  - pantoprazole 40 mg OD  - transfuse Hgb <7    #Hyponatremia  - new upon admission, possibly from decreased PO intake or SIADH due to severe pain  - euvolemic on exam without LE edema  Plan:  - serum osm, urine osm, urine sodium     #T2DM  - Most recent A1C 8.5, 5/14/25  - Home regimen: 20 units glargine daily PM, 4 units TID with meals + sliding scale 4-20  Plan:  - 15 units glargine  - SS2, will reevaluate tomorrow for prandial dosage      #HTN  #HLD  - -140/DBP 50-70  - most recent LDL 35, HDL 35.8, total cholesterol 80  - Home regimen: carvedilol 37.5 mg BID, nifedipine 90 mg BID, imdur 30 mg  - pravastatin 10 mg     Fluids: Replete  PRN  Electrolytes: Keep mg >2, phos >3  and K >4  Nutrition:  NPO Diet Except: Other (specify); Additional Details: Clear liquids until 0500. May have clears up to 2 hours prior to procedure if exact time is known.; Effective midnight   Antimicrobials:   DVT PPX: DVT: Unfractionated Heparin  GI ppx: continue home pantoprazole 40 mg BID   Bowel care: Miralax  Catheter:None  Lines:PIV  Oxygen:Room Air  Drips: None    Code Status: Full Code (confirmed on admission)   NOK:  Primary Emergency Contact: KETTY PLASENCIA, Erik Phone: [phone]     Shu Diego MS5  Internal Medicine Acting Intern[facility]  June 4th, 2025, 6:29 AM    This note is not finalized until attested by staff.         [1] acetaminophen, 975 mg, oral, TID  carvedilol, 37.5 mg, oral, BID  insulin glargine, 15 Units, subcutaneous, Nightly  insulin lispro, 0-10 Units, subcutaneous, TID AC  [2]    [3] PRN medications: dextrose, dextrose, glucagon, glucagon, oxyCODONE

## 2025-06-04 NOTE — CARE PLAN
The patient's goals for the shift include Patient will have no c/o of pain or rate pain <4 this shift    The clinical goals for the shift include Patient will be free from falls/injury this shift    Over the shift, the patient did not make progress toward the following goals. Barriers to progression include acute disease process. Recommendations to address these barriers include adherence to treatment plan.

## 2025-06-04 NOTE — HOSPITAL COURSE
This is a 68 yo patient with PMH ESRD s/p 2 transplants currently on iHD Tu/Thu/Sat, HTN, T2DM, remote HCV infection s/p harvoni, and prostate cx s/p radical prostatectomy who presented to the ED for persistent R shoulder pain and large R shoulder joint effusion.     MRI obtained on 6/3 found displaced coracoid process fracture (w/o evidence of pathological fracture), massive rotator cuff tear, biceps tendon rupture, and severe glenohumeral osteoarthritis. Orthopedic surgery determined there would be no need for intervention inpatient. He was given stepwise pain regimen with tylenol, oxycodone, and dilaudid for breakthrough pain. Pain was well controlled on 10 mg oxycodone, however he was becoming drowsy. His pain regimen was changed to 5 mg at a higher frequency.    He received dialysis on 6/5, hemoglobin decreased to 7 and he became hyperkalemic to 5.5., BUN 44. He was taken for dialysis, and     Anemia worsened, Hgb dropped to 6.9 on 6/6 and patient received 1 unit of pRBC.

## 2025-06-05 ENCOUNTER — APPOINTMENT (OUTPATIENT)
Dept: DIALYSIS | Facility: HOSPITAL | Age: 70
End: 2025-06-05
Payer: COMMERCIAL

## 2025-06-05 LAB
ALBUMIN SERPL BCP-MCNC: 2.8 G/DL (ref 3.4–5)
ANION GAP SERPL CALC-SCNC: 18 MMOL/L (ref 10–20)
BASOPHILS # BLD AUTO: 0.03 X10*3/UL (ref 0–0.1)
BASOPHILS NFR BLD AUTO: 0.5 %
BUN SERPL-MCNC: 53 MG/DL (ref 6–23)
CALCIUM SERPL-MCNC: 8.8 MG/DL (ref 8.6–10.6)
CHLORIDE SERPL-SCNC: 95 MMOL/L (ref 98–107)
CO2 SERPL-SCNC: 24 MMOL/L (ref 21–32)
CREAT SERPL-MCNC: 10.58 MG/DL (ref 0.5–1.3)
EGFRCR SERPLBLD CKD-EPI 2021: 5 ML/MIN/1.73M*2
EOSINOPHIL # BLD AUTO: 0.37 X10*3/UL (ref 0–0.7)
EOSINOPHIL NFR BLD AUTO: 6.2 %
ERYTHROCYTE [DISTWIDTH] IN BLOOD BY AUTOMATED COUNT: 16.2 % (ref 11.5–14.5)
GLUCOSE BLD MANUAL STRIP-MCNC: 162 MG/DL (ref 74–99)
GLUCOSE BLD MANUAL STRIP-MCNC: 87 MG/DL (ref 74–99)
GLUCOSE SERPL-MCNC: 39 MG/DL (ref 74–99)
HCT VFR BLD AUTO: 22.5 % (ref 41–52)
HGB BLD-MCNC: 7 G/DL (ref 13.5–17.5)
IMM GRANULOCYTES # BLD AUTO: 0.02 X10*3/UL (ref 0–0.7)
IMM GRANULOCYTES NFR BLD AUTO: 0.3 % (ref 0–0.9)
LYMPHOCYTES # BLD AUTO: 0.98 X10*3/UL (ref 1.2–4.8)
LYMPHOCYTES NFR BLD AUTO: 16.4 %
MAGNESIUM SERPL-MCNC: 2.08 MG/DL (ref 1.6–2.4)
MCH RBC QN AUTO: 27.2 PG (ref 26–34)
MCHC RBC AUTO-ENTMCNC: 31.1 G/DL (ref 32–36)
MCV RBC AUTO: 88 FL (ref 80–100)
MONOCYTES # BLD AUTO: 1.03 X10*3/UL (ref 0.1–1)
MONOCYTES NFR BLD AUTO: 17.2 %
NEUTROPHILS # BLD AUTO: 3.55 X10*3/UL (ref 1.2–7.7)
NEUTROPHILS NFR BLD AUTO: 59.4 %
NRBC BLD-RTO: 0 /100 WBCS (ref 0–0)
PHOSPHATE SERPL-MCNC: 5.8 MG/DL (ref 2.5–4.9)
PLATELET # BLD AUTO: 144 X10*3/UL (ref 150–450)
POTASSIUM SERPL-SCNC: 4.7 MMOL/L (ref 3.5–5.3)
RBC # BLD AUTO: 2.57 X10*6/UL (ref 4.5–5.9)
SODIUM SERPL-SCNC: 132 MMOL/L (ref 136–145)
WBC # BLD AUTO: 6 X10*3/UL (ref 4.4–11.3)

## 2025-06-05 PROCEDURE — 90935 HEMODIALYSIS ONE EVALUATION: CPT | Performed by: HOSPITALIST

## 2025-06-05 PROCEDURE — 85025 COMPLETE CBC W/AUTO DIFF WBC: CPT

## 2025-06-05 PROCEDURE — 2500000001 HC RX 250 WO HCPCS SELF ADMINISTERED DRUGS (ALT 637 FOR MEDICARE OP)

## 2025-06-05 PROCEDURE — 1100000001 HC PRIVATE ROOM DAILY

## 2025-06-05 PROCEDURE — 2500000005 HC RX 250 GENERAL PHARMACY W/O HCPCS

## 2025-06-05 PROCEDURE — 99223 1ST HOSP IP/OBS HIGH 75: CPT | Performed by: STUDENT IN AN ORGANIZED HEALTH CARE EDUCATION/TRAINING PROGRAM

## 2025-06-05 PROCEDURE — 2500000002 HC RX 250 W HCPCS SELF ADMINISTERED DRUGS (ALT 637 FOR MEDICARE OP, ALT 636 FOR OP/ED)

## 2025-06-05 PROCEDURE — 2500000004 HC RX 250 GENERAL PHARMACY W/ HCPCS (ALT 636 FOR OP/ED): Mod: TB

## 2025-06-05 PROCEDURE — 99232 SBSQ HOSP IP/OBS MODERATE 35: CPT | Performed by: INTERNAL MEDICINE

## 2025-06-05 PROCEDURE — 82947 ASSAY GLUCOSE BLOOD QUANT: CPT

## 2025-06-05 PROCEDURE — 80069 RENAL FUNCTION PANEL: CPT

## 2025-06-05 PROCEDURE — 83735 ASSAY OF MAGNESIUM: CPT

## 2025-06-05 PROCEDURE — 2500000004 HC RX 250 GENERAL PHARMACY W/ HCPCS (ALT 636 FOR OP/ED)

## 2025-06-05 RX ORDER — OXYCODONE HYDROCHLORIDE 5 MG/1
5 TABLET ORAL EVERY 2 HOUR PRN
Refills: 0 | Status: DISCONTINUED | OUTPATIENT
Start: 2025-06-05 | End: 2025-06-08

## 2025-06-05 RX ADMIN — INSULIN GLARGINE 15 UNITS: 100 INJECTION, SOLUTION SUBCUTANEOUS at 20:44

## 2025-06-05 RX ADMIN — METOCLOPRAMIDE 5 MG: 5 TABLET ORAL at 15:33

## 2025-06-05 RX ADMIN — OXYCODONE 10 MG: 5 TABLET ORAL at 20:43

## 2025-06-05 RX ADMIN — INSULIN LISPRO 2 UNITS: 100 INJECTION, SOLUTION INTRAVENOUS; SUBCUTANEOUS at 16:44

## 2025-06-05 RX ADMIN — PREDNISONE 5 MG: 5 TABLET ORAL at 11:12

## 2025-06-05 RX ADMIN — ACETAMINOPHEN 975 MG: 325 TABLET ORAL at 11:11

## 2025-06-05 RX ADMIN — OXYCODONE 10 MG: 5 TABLET ORAL at 06:17

## 2025-06-05 RX ADMIN — METOCLOPRAMIDE 5 MG: 5 TABLET ORAL at 11:18

## 2025-06-05 RX ADMIN — HYDROMORPHONE HYDROCHLORIDE 0.4 MG: 1 INJECTION, SOLUTION INTRAMUSCULAR; INTRAVENOUS; SUBCUTANEOUS at 15:32

## 2025-06-05 RX ADMIN — TACROLIMUS 0.5 MG: 0.5 CAPSULE ORAL at 06:16

## 2025-06-05 RX ADMIN — PRAVASTATIN SODIUM 10 MG: 20 TABLET ORAL at 20:44

## 2025-06-05 RX ADMIN — CARVEDILOL 37.5 MG: 25 TABLET, FILM COATED ORAL at 16:50

## 2025-06-05 RX ADMIN — ISOSORBIDE MONONITRATE 30 MG: 30 TABLET, EXTENDED RELEASE ORAL at 11:15

## 2025-06-05 RX ADMIN — CARVEDILOL 37.5 MG: 25 TABLET, FILM COATED ORAL at 20:43

## 2025-06-05 RX ADMIN — DARBEPOETIN ALFA 60 MCG: 60 INJECTION, SOLUTION INTRAVENOUS; SUBCUTANEOUS at 16:48

## 2025-06-05 RX ADMIN — TACROLIMUS OINTMENT 0.1%: 1 OINTMENT TOPICAL at 20:47

## 2025-06-05 RX ADMIN — PANTOPRAZOLE SODIUM 40 MG: 40 TABLET, DELAYED RELEASE ORAL at 06:16

## 2025-06-05 RX ADMIN — NIFEDIPINE 90 MG: 90 TABLET, FILM COATED, EXTENDED RELEASE ORAL at 20:44

## 2025-06-05 RX ADMIN — ACETAMINOPHEN 975 MG: 325 TABLET ORAL at 20:42

## 2025-06-05 RX ADMIN — ASCORBIC ACID, THIAMINE MONONITRATE,RIBOFLAVIN, NIACINAMIDE, PYRIDOXINE HYDROCHLORIDE, FOLIC ACID, CYANOCOBALAMIN, BIOTIN, CALCIUM PANTOTHENATE, 1 CAPSULE: 100; 1.5; 1.7; 20; 10; 1; 6000; 150000; 5 CAPSULE, LIQUID FILLED ORAL at 11:12

## 2025-06-05 RX ADMIN — ACETAMINOPHEN 975 MG: 325 TABLET ORAL at 15:33

## 2025-06-05 RX ADMIN — LIDOCAINE 1 PATCH: 4 PATCH TOPICAL at 11:13

## 2025-06-05 RX ADMIN — OXYCODONE 10 MG: 5 TABLET ORAL at 11:09

## 2025-06-05 RX ADMIN — NIFEDIPINE 90 MG: 90 TABLET, FILM COATED, EXTENDED RELEASE ORAL at 11:15

## 2025-06-05 RX ADMIN — POLYETHYLENE GLYCOL 3350 17 G: 17 POWDER, FOR SOLUTION ORAL at 11:13

## 2025-06-05 RX ADMIN — TACROLIMUS OINTMENT 0.1%: 1 OINTMENT TOPICAL at 11:14

## 2025-06-05 ASSESSMENT — PAIN - FUNCTIONAL ASSESSMENT
PAIN_FUNCTIONAL_ASSESSMENT: 0-10
PAIN_FUNCTIONAL_ASSESSMENT: 0-10
PAIN_FUNCTIONAL_ASSESSMENT: NO/DENIES PAIN
PAIN_FUNCTIONAL_ASSESSMENT: 0-10

## 2025-06-05 ASSESSMENT — PAIN DESCRIPTION - LOCATION
LOCATION: SHOULDER

## 2025-06-05 ASSESSMENT — COGNITIVE AND FUNCTIONAL STATUS - GENERAL
MOBILITY SCORE: 24
DAILY ACTIVITIY SCORE: 24

## 2025-06-05 ASSESSMENT — PAIN DESCRIPTION - DESCRIPTORS
DESCRIPTORS: ACHING

## 2025-06-05 ASSESSMENT — PAIN SCALES - GENERAL
PAINLEVEL_OUTOF10: 4
PAINLEVEL_OUTOF10: 6
PAINLEVEL_OUTOF10: 8
PAINLEVEL_OUTOF10: 10 - WORST POSSIBLE PAIN
PAINLEVEL_OUTOF10: 5 - MODERATE PAIN
PAINLEVEL_OUTOF10: 7

## 2025-06-05 ASSESSMENT — ACTIVITIES OF DAILY LIVING (ADL): LACK_OF_TRANSPORTATION: NO

## 2025-06-05 ASSESSMENT — PAIN DESCRIPTION - ORIENTATION
ORIENTATION: RIGHT

## 2025-06-05 NOTE — NURSING NOTE
Report to Receiving RN:    Report To: AYO Seals  Time Report Called: 1030  Hand-Off Communication: Pt completed 3 hrs HD, 0 fluid removed, tolerated tx, /66, HR  85, pt stable, alert.  Complications During Treatment: No  Ultrafiltration Treatment: Yes  Medications Administered During Dialysis: No  Blood Products Administered During Dialysis: No  Labs Sent During Dialysis: No  Heparin Drip Rate Changes: No  Dialysis Catheter Dressing: N/A, AVF  Last Dressing Change: N/A    Last Updated: 10:30 AM by SUJIT WAYNE

## 2025-06-05 NOTE — NURSING NOTE
Report from Sending RN:    Report From: AYO Howard  Recent Surgery of Procedure: No  Baseline Level of Consciousness (LOC): A&O X3  Oxygen Use: No  Type: Room air  Diabetic: Yes, 138 mg/dl  Last BP Med Given Day of Dialysis: none  Last Pain Med Given: dilaudid  Lab Tests to be Obtained with Dialysis: Yes, cbc, rfp, mg  Blood Transfusion to be Given During Dialysis: No  Available IV Access: Yes  Medications to be Administered During Dialysis: No  Continuous IV Infusion Running: No  Restraints on Currently or in the Last 24 Hours: No  Hand-Off Communication: Pt had no acute event overnight, vital signs stable. Not on precaution, no morning medication given. Travel by cart.  Dialysis Catheter Dressing: AVF  Last Dressing Change: N/A

## 2025-06-05 NOTE — CARE PLAN
The patient's goals for the shift include Patient will have no c/o of pain or rate pain <4 this shift    The clinical goals for the shift include patient will be free from fall during this shift

## 2025-06-05 NOTE — CONSULTS
Manolo Bashir is a 69 y.o. year old male patient who presents for  with Holden Rogers MD on .  Acute Pain consulted for assistance with pain control.     Patient is a 70 y/o male with ESRD of HD (T/Th/S) (s/p renal transplant x2 c/b allograft failure), MGUS, T2DM, HTN, prostate cancer s/p radical prostatectomy, HCV who presented with R shoulder pain and effusion for 3 months found on MRI to have displaced coracoid process fracture, massive rotator cuff tea, biceps tendon rupture, and severe glenohumeral osteoarthritis.  There is currently no plan for surgical intervention during this admission. Acute pain was consulted for additional pain management.    Medical History[1]     Surgical History[2]     Family History[3]     Social History     Socioeconomic History    Marital status: Single     Spouse name: Not on file    Number of children: Not on file    Years of education: Not on file    Highest education level: Not on file   Occupational History    Not on file   Tobacco Use    Smoking status: Never     Passive exposure: Past    Smokeless tobacco: Never   Vaping Use    Vaping status: Never Used   Substance and Sexual Activity    Alcohol use: Never    Drug use: Not on file    Sexual activity: Defer   Other Topics Concern    Not on file   Social History Narrative    Not on file     Social Drivers of Health     Financial Resource Strain: Low Risk  (6/5/2025)    Overall Financial Resource Strain (CARDIA)     Difficulty of Paying Living Expenses: Not hard at all   Food Insecurity: No Food Insecurity (6/3/2025)    Hunger Vital Sign     Worried About Running Out of Food in the Last Year: Never true     Ran Out of Food in the Last Year: Never true   Transportation Needs: No Transportation Needs (6/5/2025)    PRAPARE - Transportation     Lack of Transportation (Medical): No     Lack of Transportation (Non-Medical): No   Physical Activity: Sufficiently Active (1/5/2025)    Exercise Vital Sign     Days of Exercise per  Week: 7 days     Minutes of Exercise per Session: 60 min   Stress: No Stress Concern Present (3/1/2025)    [employer] - Occupational Stress Questionnaire     Feeling of Stress : Not at all   Social Connections: Socially Isolated (5/25/2025)    Social Connection and Isolation Panel [NHANES]     Frequency of Communication with Friends and Family: More than three times a week     Frequency of Social Gatherings with Friends and Family: More than three times a week     Attends Islam Services: Never     Active Member of Clubs or Organizations: No     Attends Club or Organization Meetings: Never     Marital Status: Never    Intimate Partner Violence: Not At Risk (6/3/2025)    Humiliation, Afraid, Rape, and Kick questionnaire     Fear of Current or Ex-Partner: No     Emotionally Abused: No     Physically Abused: No     Sexually Abused: No   Housing Stability: Low Risk  (6/5/2025)    Housing Stability Vital Sign     Unable to Pay for Housing in the Last Year: No     Number of Times Moved in the Last Year: 0     Homeless in the Last Year: No        RX Allergies[4]      Review of Systems  Gen: No fatigue, anorexia, insomnia, fever.   Eyes: No vision loss, double vision, drainage, eye pain.   ENT: No pharyngitis, dry mouth, no hearing changes or ear discharge  Cardiac: No chest pain, palpitations, syncope, near syncope.   Pulmonary: No shortness of breath, cough, hemoptysis.   Heme/lymph: No swollen glands, fever, bleeding.   GI: No abdominal pain, change in bowel habits, melena, hematemesis, hematochezia, nausea, vomiting, diarrhea.   : No discharge, dysuria, frequency, urgency, hematuria.  Endo: No polyuria or weight loss.   Musculoskeletal: Negative for any pain or loss of ROM/weakness  Skin: No rashes or lesions  Neuro: Normal speech, no numbness or weakness. No gait difficulties  Review of systems is otherwise negative unless stated above or in history of present  illness.    Physical Exam:  Constitutional:  no distress, alert and cooperative  Eyes: clear sclera  Head/Neck: No apparent injury, trachea midline  Respiratory/Thorax: Patent airways, thorax symmetric, breathing comfortably  Cardiovascular: no pitting edema  Gastrointestinal: Nondistended  Musculoskeletal: ROM intact  Extremities: no clubbing  Neurological: alert, blake x4  Psychological: Appropriate affect    Results for orders placed or performed during the hospital encounter of 06/03/25 (from the past 24 hours)   CBC and Auto Differential   Result Value Ref Range    WBC 5.4 4.4 - 11.3 x10*3/uL    nRBC 0.0 0.0 - 0.0 /100 WBCs    RBC 2.58 (L) 4.50 - 5.90 x10*6/uL    Hemoglobin 7.0 (L) 13.5 - 17.5 g/dL    Hematocrit 23.0 (L) 41.0 - 52.0 %    MCV 89 80 - 100 fL    MCH 27.1 26.0 - 34.0 pg    MCHC 30.4 (L) 32.0 - 36.0 g/dL    RDW 16.5 (H) 11.5 - 14.5 %    Platelets 92 (L) 150 - 450 x10*3/uL    Neutrophils % 76.6 40.0 - 80.0 %    Immature Granulocytes %, Automated 0.6 0.0 - 0.9 %    Lymphocytes % 10.7 13.0 - 44.0 %    Monocytes % 9.8 2.0 - 10.0 %    Eosinophils % 1.7 0.0 - 6.0 %    Basophils % 0.6 0.0 - 2.0 %    Neutrophils Absolute 4.17 1.20 - 7.70 x10*3/uL    Immature Granulocytes Absolute, Automated 0.03 0.00 - 0.70 x10*3/uL    Lymphocytes Absolute 0.58 (L) 1.20 - 4.80 x10*3/uL    Monocytes Absolute 0.53 0.10 - 1.00 x10*3/uL    Eosinophils Absolute 0.09 0.00 - 0.70 x10*3/uL    Basophils Absolute 0.03 0.00 - 0.10 x10*3/uL   Osmolality   Result Value Ref Range    Osmolality, Serum 294 280 - 300 mOsm/kg   Renal function panel   Result Value Ref Range    Glucose 138 (H) 74 - 99 mg/dL    Sodium 131 (L) 136 - 145 mmol/L    Potassium 5.5 (H) 3.5 - 5.3 mmol/L    Chloride 95 (L) 98 - 107 mmol/L    Bicarbonate 24 21 - 32 mmol/L    Anion Gap 18 10 - 20 mmol/L    Urea Nitrogen 44 (H) 6 - 23 mg/dL    Creatinine 8.66 (H) 0.50 - 1.30 mg/dL    eGFR 6 (L) >60 mL/min/1.73m*2    Calcium 9.4 8.6 - 10.6 mg/dL    Phosphorus 5.3 (H) 2.5 -  4.9 mg/dL    Albumin 2.8 (L) 3.4 - 5.0 g/dL   Magnesium   Result Value Ref Range    Magnesium 1.89 1.60 - 2.40 mg/dL   POCT GLUCOSE   Result Value Ref Range    POCT Glucose 139 (H) 74 - 99 mg/dL   POCT GLUCOSE   Result Value Ref Range    POCT Glucose 206 (H) 74 - 99 mg/dL   CBC and Auto Differential   Result Value Ref Range    WBC 6.0 4.4 - 11.3 x10*3/uL    nRBC 0.0 0.0 - 0.0 /100 WBCs    RBC 2.57 (L) 4.50 - 5.90 x10*6/uL    Hemoglobin 7.0 (L) 13.5 - 17.5 g/dL    Hematocrit 22.5 (L) 41.0 - 52.0 %    MCV 88 80 - 100 fL    MCH 27.2 26.0 - 34.0 pg    MCHC 31.1 (L) 32.0 - 36.0 g/dL    RDW 16.2 (H) 11.5 - 14.5 %    Platelets 144 (L) 150 - 450 x10*3/uL    Neutrophils % 59.4 40.0 - 80.0 %    Immature Granulocytes %, Automated 0.3 0.0 - 0.9 %    Lymphocytes % 16.4 13.0 - 44.0 %    Monocytes % 17.2 2.0 - 10.0 %    Eosinophils % 6.2 0.0 - 6.0 %    Basophils % 0.5 0.0 - 2.0 %    Neutrophils Absolute 3.55 1.20 - 7.70 x10*3/uL    Immature Granulocytes Absolute, Automated 0.02 0.00 - 0.70 x10*3/uL    Lymphocytes Absolute 0.98 (L) 1.20 - 4.80 x10*3/uL    Monocytes Absolute 1.03 (H) 0.10 - 1.00 x10*3/uL    Eosinophils Absolute 0.37 0.00 - 0.70 x10*3/uL    Basophils Absolute 0.03 0.00 - 0.10 x10*3/uL   Renal Function Panel   Result Value Ref Range    Glucose 39 (LL) 74 - 99 mg/dL    Sodium 132 (L) 136 - 145 mmol/L    Potassium 4.7 3.5 - 5.3 mmol/L    Chloride 95 (L) 98 - 107 mmol/L    Bicarbonate 24 21 - 32 mmol/L    Anion Gap 18 10 - 20 mmol/L    Urea Nitrogen 53 (H) 6 - 23 mg/dL    Creatinine 10.58 (H) 0.50 - 1.30 mg/dL    eGFR 5 (L) >60 mL/min/1.73m*2    Calcium 8.8 8.6 - 10.6 mg/dL    Phosphorus 5.8 (H) 2.5 - 4.9 mg/dL    Albumin 2.8 (L) 3.4 - 5.0 g/dL   Magnesium   Result Value Ref Range    Magnesium 2.08 1.60 - 2.40 mg/dL   POCT GLUCOSE   Result Value Ref Range    POCT Glucose 87 74 - 99 mg/dL     *Note: Due to a large number of results and/or encounters for the requested time period, some results have not been displayed. A  complete set of results can be found in Results Review.        Patient is a 70 y/o male with ESRD of HD (T/Th/S) (s/p renal transplant x2 c/b allograft failure), MGUS, T2DM, HTN, prostate cancer s/p radical prostatectomy, HCV who presented with R shoulder pain and effusion for 3 months found on MRI to have displaced coracoid process fracture, massive rotator cuff tea, biceps tendon rupture, and severe glenohumeral osteoarthritis.  There is currently no plan for surgical intervention during this admission. Acute pain was consulted for additional pain management.    Plan:  - Not candidate for acute pain nerve block at this time as patient is not planned for surgical intervention. If patient were to have surgical intervention during this admission or future admission, acute pain should be consulted for evaluation for perioperative block.  - Recommend coordination with chronic pain during this admission to establish outpatient follow-up for possible outpatient procedural intervention and additional management of pain medication.  - Recommend continuing scheduled PO tylenol, lidocaine patches, oxy 5/10, IV dilaudid prn for pain management while inpatient.   - Can consider giving 1g IV Mg over 2 hrs x1 while inpatient for improved pain control  - Upon discharge, would recommend increasing home percocet to 7.5-325 q6 as needed.  - Can consider starting Lyrica on discharge (start at 25 mg/day in s/o of ESRD)  - APS will sign off at this time    Acute Pain Resident  pg [phone] ph [phone]        [1]   Past Medical History:  Diagnosis Date    Anemia     Arthritis     Cataract     Chronic kidney disease, stage 3 unspecified (Multi) 09/26/2018    Stage 3 chronic kidney disease    CKD (chronic kidney disease)     stage V    Cough 02/12/2024    COVID-19 06/18/2020    COVID-19 virus infection    Diabetes (Multi)     ESRD (end stage renal disease) (Multi)     Focal and segmental proliferative glomerulonephritis 12/23/2023    HTN  (hypertension)     Hyperlipidemia     Other long term (current) drug therapy 07/20/2021    High risk medication use    Personal history of other diseases of the circulatory system     Personal history of cardiac murmur    Personal history of other infectious and parasitic diseases 08/17/2015    History of hepatitis    Polyp, colonic 08/17/2023    Primary osteoarthritis of both ankles 08/17/2023    Prostate cancer (Multi)     Tubular adenoma of colon 08/17/2023    Unspecified kidney failure 08/17/2016    Renal failure   [2]   Past Surgical History:  Procedure Laterality Date    ILEOSTOMY  04/25/2017    Ileostomy    ILEOSTOMY CLOSURE  08/17/2015    Ileostomy Closure    OTHER SURGICAL HISTORY  04/21/2017    Right Hemicolectomy    OTHER SURGICAL HISTORY  08/17/2015    Arteriovenous Surgery Creation Of A-V Fistula    OTHER SURGICAL HISTORY  08/17/2015    Sigmoidoscopy (Fiberoptic, Therapeutic )    PROSTATECTOMY  10/11/2013    Prostatectomy Radical    TRANSPLANT, KIDNEY, OPEN  1992    TRANSPLANT, KIDNEY, OPEN  2013    US GUIDED PERCUTANEOUS BIOPSY RENAL LEFT Left 11/20/2023    US GUIDED PERCUTANEOUS BIOPSY RENAL LEFT 11/20/2023 Lou Rodgers MD [facility]    US GUIDED PERCUTANEOUS PERITONEAL OR RETROPERITONEAL FLUID COLLECTION DRAINAGE  10/20/2022    US GUIDED PERCUTANEOUS PERITONEAL OR RETROPERITONEAL FLUID COLLECTION DRAINAGE 10/20/2022 [facility]ICAL LEGAC[facility]   [3]   Family History  Problem Relation Name Age of Onset    Bone cancer Mother      Other (corona's sarcome of the bone marrow) Mother      Prostate cancer Father      Diabetes Other Family Hist     Hypertension Other Family Hist    [4] No Known Allergies

## 2025-06-05 NOTE — PROGRESS NOTES
06/05/25 0801   Discharge Planning   Living Arrangements Children  (Home with dtr Purnima.)   Support Systems Children   Assistance Needed None.   Type of Residence Private residence   Do you have animals or pets at home? No   Who is requesting discharge planning? Patient   Home or Post Acute Services None   Expected Discharge Disposition Home   Does the patient need discharge transport arranged? No  (Pt's dtr will provide.)   Financial Resource Strain   How hard is it for you to pay for the very basics like food, housing, medical care, and heating? Not hard   Housing Stability   In the last 12 months, was there a time when you were not able to pay the mortgage or rent on time? N   At any time in the past 12 months, were you homeless or living in a shelter (including now)? N   Transportation Needs   In the past 12 months, has lack of transportation kept you from medical appointments or from getting medications? no   In the past 12 months, has lack of transportation kept you from meetings, work, or from getting things needed for daily living? No   Intensity of Service   Intensity of Service 0-30 min     Assessment Note:  Met with pt and introduced myself as care coordinator and member of the Care Transitions team for discharge planning.   Pt feels safe at home, and was independent prior to admission.  Pt's dtr provides transport to drs appts.  Pt's address, phone number and contact information was verified.  Pt does not have any questions/concerns at this time.     Previous Home Care: None.  DME: Glucometer.  Pharmacy: [facility].  Falls: Denies.  PCP:  [facility] GIANCARLO Hutton  Dialysis: [facility] on TTS at 0630.  Pt's dtr provides transport.      Transitional Care Coordination Progress Note:  Patient was discussed during interdisciplinary rounds.  Team members present: medical team, and TCC.  Plan per medical team: Pt was readmitted with right shoulder pain, ortho consulted.   Payer: [employer].  Status: Inpatient.  Discharge disposition: Pending MRI results and ortho recommendations.  Anticipate discharge home with outpatient follow up.  Potential barriers: None.   ADOD: 6/5  Care coordinator will continue to follow for discharge planning needs.     Linh Bueno MSN, RN-BC  Transitional Care Coordinator (TCC)  [phone]

## 2025-06-05 NOTE — CARE PLAN
The patient's goals for the shift include Patient will have no c/o of pain or rate pain <4 this shift    The clinical goals for the shift include Pt will remain free from falls/injuries and have pain less than 5 throughout this shift    Over the shift, the patient did not make progress toward the following goals.   Problem: Pain - Adult  Goal: Verbalizes/displays adequate comfort level or baseline comfort level  Outcome: Progressing     Problem: Safety - Adult  Goal: Free from fall injury  Outcome: Progressing     Problem: Discharge Planning  Goal: Discharge to home or other facility with appropriate resources  Outcome: Progressing     Problem: Chronic Conditions and Co-morbidities  Goal: Patient's chronic conditions and co-morbidity symptoms are monitored and maintained or improved  Outcome: Progressing     Problem: Nutrition  Goal: Nutrient intake appropriate for maintaining nutritional needs  Outcome: Progressing     Problem: Fall/Injury  Goal: Not fall by end of shift  Outcome: Progressing  Goal: Be free from injury by end of the shift  Outcome: Progressing  Goal: Verbalize understanding of personal risk factors for fall in the hospital  Outcome: Progressing  Goal: Verbalize understanding of risk factor reduction measures to prevent injury from fall in the home  Outcome: Progressing  Goal: Use assistive devices by end of the shift  Outcome: Progressing  Goal: Pace activities to prevent fatigue by end of the shift  Outcome: Progressing     Problem: Respiratory  Goal: Clear secretions with interventions this shift  Outcome: Progressing  Goal: Minimize anxiety/maximize coping throughout shift  Outcome: Progressing  Goal: Minimal/no exertional discomfort or dyspnea this shift  Outcome: Progressing  Goal: No signs of respiratory distress (eg. Use of accessory muscles. Peds grunting)  Outcome: Progressing  Goal: Patent airway maintained this shift  Outcome: Progressing  Goal: Tolerate mechanical ventilation evidenced  by VS/agitation level this shift  Outcome: Progressing  Goal: Tolerate pulmonary toileting this shift  Outcome: Progressing  Goal: Verbalize decreased shortness of breath this shift  Outcome: Progressing  Goal: Wean oxygen to maintain O2 saturation per order/standard this shift  Outcome: Progressing  Goal: Increase self care and/or family involvement in next 24 hours  Outcome: Progressing     Problem: Skin  Goal: Decreased wound size/increased tissue granulation at next dressing change  Outcome: Progressing  Goal: Participates in plan/prevention/treatment measures  Outcome: Progressing  Goal: Prevent/manage excess moisture  Outcome: Progressing  Goal: Prevent/minimize sheer/friction injuries  Outcome: Progressing  Goal: Promote/optimize nutrition  Outcome: Progressing  Goal: Promote skin healing  Outcome: Progressing

## 2025-06-05 NOTE — PROGRESS NOTES
"Subjective     Manolo Bashir is a 69 y.o. male on day 1 of admission presenting with R shoulder pain and large R shoulder effusion. 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    Interval history  -EMMA, hemodynamically stable, afebrile  -received 10 mg oxycodone yesterday PM and at 6 AM today, feeling very drowsy this morning and was answering in one word sentences  -pain was better controlled  -hemoglobin 7, K 5.5, Na 131    Objective   Current Vitals  BP (!) 142/45   Pulse 76   Temp 36 °C (96.8 °F) (Temporal)   Resp 16   Ht 1.727 m (5' 7.99\")   Wt 68 kg (149 lb 14.6 oz)   SpO2 92%   BMI 22.80 kg/m²      Physical Exam  Constitutional:       Comments: Appear uncomfortable in bed from pain   Cardiovascular:      Rate and Rhythm: Normal rate and regular rhythm.      Heart sounds: Normal heart sounds. No murmur heard.  Pulmonary:      Effort: Pulmonary effort is normal.      Breath sounds: Normal breath sounds.   Abdominal:      General: Abdomen is flat. There is no distension.      Palpations: Abdomen is soft.      Tenderness: There is no abdominal tenderness.   Musculoskeletal:         General: Swelling and deformity present.      Comments: Right shoulder: large effusion present, moderate pain with lateral raise of shoulder    Skin:     General: Skin is warm and dry.      Comments: Fistula present in LUE    Neurological:      Mental Status: He is alert.       Relevant Results  Labs:  CBC: WBC 6.0 , HGB 7.0,   BMP: , K 4.7, Cl 95, HCO3 24, BUN 53, CR 10.58, Glu 39  LFTS: AST 24 , ALT 15, ALKPHOS 85 , TBILI 0.7 , DBILI 0.1  TROP: 165  BNP: 2,479  COAGS: PT 12.6 , PTT 30  , INR 1.1  CALCIUM 8.8   MAG No results found for requested labs within last 365 days. " ALB 2.8 LACTATE 0.9 PHOS No results found for requested labs within last 365 days. COVIDNo results found for requested labs within last 365 days.    Micro/culture data:  Susceptibility data from last 120 days.  Collected Organism   05/25/25 1807 Staphylococcus hominis   05/20/25 2220 Staphylococcus hominis     Imaging:  ECG 12 lead  Suspect arm lead reversal, interpretation assumes no reversal  Normal sinus rhythm  Right axis deviation  Nonspecific intraventricular block  Minimal voltage criteria for LVH, may be normal variant ( [place] product )  Abnormal ECG  When compared with ECG of 25-MAY-2025 21:29,  T wave inversion no longer evident in Inferior leads  T wave inversion no longer evident in Lateral leads    MR shoulder right w and wo IV contrast  Impression: 1. Displaced fracture of the coracoid process with smooth, scalloped  fracture margins and no evidence of a marrow-replacing lesion, making  a pathologic fracture unlikely. Mild underlying cortical scalloping  is likely chronic and may relate to a longstanding large right  shoulder joint effusion, which demonstrates heterogeneous T2  hypointense signal, suggestive of synovitis with hemosiderin  deposition.  2. Massive rotator cuff tearing involving the entire supraspinatus,  infraspinatus and superior subscapularis fibers.  3. Mild supraspinatus muscle atrophy with moderate edema involving  the supraspinatus, infraspinatus, and subscapularis muscles.  Additional feathery edema pattern within the visualized shoulder  musculature, suggestive of myositis or muscle strain. Recommend  clinical correlation.  4. The long head biceps tendon is torn and retracted with  superimposed tendinosis of the extra-articular long head biceps  tendon.  5. Severe glenohumeral joint osteoarthrosis.      XR shoulder right 2+ views  FINDINGS:  Right shoulder, three views      Subacute fracture through the coracoid process is again seen with  sclerosis. There is no change in  alignment. Previously seen impaction  fracture in the humeral head difficult to visualize radiographically.  Note is made of an unfused os acromiale. There is a and inferior  subluxation of the humeral head suggestive of underlying glenohumeral  joint effusion.      Impression: Subacute fracture through the coracoid process. Impaction fracture  through the humeral head difficult to visualized radiographically.  Inferior subluxation of the humeral head compatible with underlying  large glenohumeral joint effusion.    XR chest 2 views  XR CHEST 2 VIEWS;  6/3/2025 11:23 am    FINDINGS:  CARDIOMEDIASTINAL SILHOUETTE:  Cardiomediastinal silhouette is mildly enlarged.      LUNGS:  There is no consolidation or effusion. There is no edema      ABDOMEN:  No remarkable upper abdominal findings.      BONES:  No acute osseous changes.      Impression: 1.  No evidence of acute cardiopulmonary process.      MEDS:  Scheduled medications  Scheduled Medications[1]  Continuous medications  Continuous Medications[2]  PRN medications  PRN Medications[3]       Assessment/Plan     Brief summary: This is a 70 yo patient with PMH ESRD s/p 2 transplants currently on iHD Tu/Thu/Sat, HTN, T2DM, remote HCV infection s/p harvoni, and prostate cx s/p radical prostatectomy who presented to the ED for persistent R shoulder pain and large R shoulder joint effusion.     Updates 6/5:  - consult anesthesia pain for recommendations for home pain control until he is evaluated by orthopedics  - start darbepoetin every two weeks     #Acute on chronic R shoulder pain  #Subacute fracture of coracoid process, atraumatic  #Full-thickness tearing of subscapularis   #Long head biceps tendon tear  #Large glenohumeral joint effusion  - though patient is immunocompromised, low concern for septic arthritis given stable ESR/CRP, no erythema or warmth at joint, previous aspirations show no growth from 5/16 and 5/20  - previous xray 4/30 showed hypertrophic bony and  cystic changes, spurs at humeral head w/o acute fracture or dislocation  - unclear etiology of subacute coracoid process injury (pt denies trauma)  - MRI 6/3: displaced coracoid process fracture (w/o evidence of pathological fracture), massive rotator cuff tear, biceps tendon rupture, and severe glenohumeral osteoarthritis  - ddx for atraumatic fracture and degeneration: chronic degenerative arthropathy vs. beta-2 microglobulin amyloid arthropathy vs. renal osteodystrophy   - difficult to control pain, as he is becoming overly drowsy with oxycodone but in severe pain without medications  Plan:  - Orthopedics consulted, no acute intervention for shoulder, will follow outpatient with shoulder specialist  - Consult anesthesia pain team for recommendations regarding outpatient pain regimen   - Pain control:               - tylenol 975 mg Q6   - oxycodone 5 mg Q4 for moderate pain              - oxycodone 10 mg Q6 PRN for severe pain              - dilaudid 0.4 mg Q3 PRN for breakthrough pain   - lidocaine patch Q12 PRN     #ESRD s/p renal transplant x2  #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  -initial transplant 1992 c/b allograft failure 2006, 2nd transplant 2013 c/b impaired allograft fxn  -currently on iHD since 05/2024 Tues/Thurs/Sat  - creatinine 6.14 upon admission  - received only 2 hours of 3.5 of dialysis 6/3 before pain lead him to present to ED  - LUE AV fistula  - hemoglobin now 7.0  - home regimen: tacrolimus 0.5 mg, prednisone 5 mg   Plan:  - Consult transplant nephrology   - iHD Tue/Thu/Sat while in patient unless labs indicate more frequently needed  - Continue tacro + prednisone   - pantoprazole 40 mg OD  - transfuse Hgb <7  - darbepoetin once every two weeks    #Hyponatremia  - new upon admission, possibly from decreased PO intake or SIADH due to severe pain  - euvolemic on exam without LE edema  - serum osm: 294, urine osm and urine sodium pending  Plan:  - iHD will adjust sodium      #T2DM  - Most recent A1C 8.5,  5/14/25  - Home regimen: 20 units glargine daily PM, 4 units TID with meals + sliding scale 4-20  Plan:  - 15 units glargine  - No sliding scale needed yesterday, will continue to monitor BG closely and add prandial as needed     #HTN  #HLD  - SBP /DBP 50s  - most recent LDL 35, HDL 35.8, total cholesterol 80  - Home regimen: carvedilol 37.5 mg BID, nifedipine 90 mg BID, imdur 30 mg  - pravastatin 10 mg     Fluids: Replete PRN  Electrolytes: Keep mg >2, phos >3  and K >4  Nutrition:  Adult diet Regular   Antimicrobials:   DVT PPX: DVT: Unfractionated Heparin  GI ppx: continue home pantoprazole 40 mg OD  Bowel care: Miralax  Catheter:None  Lines:PIV  Oxygen:Room Air  Drips: None    Code Status: Full Code (confirmed on admission)   NOK:  Primary Emergency Contact: PLASENCIAKETTY, Home Phone: [phone]     Shu Diego MS5  Internal Medicine [facility]  June 4th, 2025, 11:14 AM    This note is not finalized until attested by staff.         [1] acetaminophen, 975 mg, oral, TID  carvedilol, 37.5 mg, oral, BID  heparin (porcine), 5,000 Units, subcutaneous, q8h  insulin glargine, 15 Units, subcutaneous, Nightly  insulin lispro, 0-10 Units, subcutaneous, TID AC  isosorbide mononitrate ER, 30 mg, oral, Daily  lidocaine, 1 patch, transdermal, Daily  metoclopramide, 5 mg, oral, TID AC  NIFEdipine ER, 90 mg, oral, BID  pantoprazole, 40 mg, oral, Daily before breakfast  polyethylene glycol, 17 g, oral, Daily  pravastatin, 10 mg, oral, Nightly  predniSONE, 5 mg, oral, Daily  tacrolimus, 0.5 mg, oral, Daily  tacrolimus, , Topical, BID  vitamin B complex-vitamin C-folic acid, 1 capsule, oral, Daily     [2]    [3] PRN medications: dextrose, dextrose, glucagon, glucagon, HYDROmorphone, oxyCODONE, oxyCODONE, sodium chloride

## 2025-06-05 NOTE — DISCHARGE SUMMARY
"Discharge Diagnosis  ESRD (end stage renal disease) on dialysis (Multi)  Displaced fracture of the right coracoid process   Complete tear of supraspinatus, infraspinatus, supraspinatus  Tear of bicep tendon  Anemia    Issues Requiring Follow-Up    Follow up with orthopedics scheduled 6/20/2025  Home pain regimen: lidocaine patch q12, lyrica daily at bedtime 25 mg until 6/13 then can be increased to 75 mg nightly, percocet 7.5 mg Q6 PRN  Follow up with nephrology for ESRD   Follow up with PCP for post-admission visit    Discharge Meds     Medication List      START taking these medications     lidocaine 4 % patch; Place 1 patch over 12 hours on the skin once daily.   Remove & discard patch within 12 hours or as directed by MD.; Start taking   on: June 8, 2025   oxyCODONE-acetaminophen 7.5-325 mg tablet; Commonly known as: Percocet;   Take 1 tablet by mouth every 6 hours if needed for moderate pain (4 - 6)   or severe pain (7 - 10) for up to 14 days.; Replaces:   oxyCODONE-acetaminophen 5-325 mg tablet   polyethylene glycol 17 gram/dose powder; Commonly known as: Glycolax,   Miralax; Mix 17 g of powder and drink once daily. Do not fill before June 8, 2025.; Start taking on: June 8, 2025   pregabalin 25 mg capsule; Commonly known as: Lyrica; Take 1 capsule (25   mg) by mouth once daily at bedtime.     CONTINUE taking these medications     BD Luer-Rita Syringe 3 mL 25 x 5/8\" syringe; Generic drug: syringe with   needle; Use to inject epogen.   carvedilol 12.5 mg tablet; Commonly known as: Coreg; Take 3 tablets   (37.5 mg) by mouth 2 times a day. Hold for systolic BP <140 and HR <60.   PATIENT NEEDS TO FOLLOW UP WITH CARDIOLOGY   clotrimazole 1 % cream; Commonly known as: Lotrimin; Apply topically 2   times a day.   Deep Sea Nasal 0.65 % nasal spray; Generic drug: sodium chloride;   Administer 1 spray into each nostril 4 times a day as needed for   congestion.   Easy Touch Alcohol Prep Pads; Generic drug: alcohol " swabs   fluocinonide 0.05 % ointment; Commonly known as: Lidex; Apply to   affected areas twice daily when active as needed. Use less than 14 days   per month.   Gvoke HypoPen 1-Pack 1 mg/0.2 mL auto-injector; Generic drug: glucagon;   Inject 1 mg into the muscle every 15 minutes if needed (For blood glucose   41 to 70 mg/dL and no IV access).   insulin lispro 100 unit/mL pen; Commonly known as: HumaLOG; 4 units with   lunch and dinner plus a sliding scale up to 20 units daily   isosorbide mononitrate ER 30 mg 24 hr tablet; Commonly known as: Imdur;   Take 1 tablet (30 mg) by mouth once daily. Do not crush or chew.   Lantus Solostar U-100 Insulin 100 unit/mL (3 mL) pen; Generic drug:   insulin glargine; 20 units daily   metoclopramide 5 mg tablet; Commonly known as: Reglan; Take 1 tablet (5   mg) by mouth 3 times a day before meals.   naloxone 4 mg/0.1 mL nasal spray; Commonly known as: Narcan; Administer   1 spray (4 mg) into affected nostril(s) if needed for opioid reversal. May   repeat every 2-3 minutes if needed, alternating nostrils, until medical   assistance becomes available.   NIFEdipine ER 90 mg 24 hr tablet; Commonly known as: Adalat CC; Take 1   tablet (90 mg) by mouth 2 times a day. Do not crush, chew, or split.   pantoprazole 40 mg EC tablet; Commonly known as: ProtoNix; Take 1 tablet   (40 mg) by mouth once daily in the morning. Take before meals. Do not   crush, chew, or split. Do not start before January 22, 2024.   pravastatin 10 mg tablet; Commonly known as: Pravachol; Take 1 tablet   (10 mg) by mouth once daily at bedtime.   predniSONE 5 mg tablet; Commonly known as: Deltasone; Take 1 tablet (5   mg) by mouth once daily.   sevelamer carbonate 800 mg tablet; Commonly known as: Renvela; Take 1   tablet (800 mg) by mouth 3 times a day before meals. Swallow tablet whole;   do not crush, break, or chew.   * tacrolimus 0.1 % ointment; Commonly known as: Protopic; Apply   topically 2 times a day.   *  "tacrolimus 0.5 mg capsule; Commonly known as: Prograf; Take 1 capsule   (0.5 mg) by mouth once daily.   True Metrix Glucose Test Strip; Generic drug: blood sugar diagnostic;   For BG check 4x/day   TRUEdraw Lancing Device misc; Generic drug: lancing device; use as   directed   TRUEplus Lancets 33 gauge misc; Generic drug: lancets; 1 each 3 times a   day.   TRUEplus Pen Needle 32 gauge x 5/32\" needle; Generic drug: pen needle,   diabetic; Use 4 a day as directed   vitamin B complex-vitamin C-folic acid 1 mg capsule; Commonly known as:   Nephrocaps; Take 1 capsule by mouth once daily.  * This list has 2 medication(s) that are the same as other medications   prescribed for you. Read the directions carefully, and ask your doctor or   other care provider to review them with you.     STOP taking these medications     oxyCODONE-acetaminophen 5-325 mg tablet; Commonly known as: Percocet;   Replaced by: oxyCODONE-acetaminophen 7.5-325 mg tablet       Test Results Pending At Discharge  Pending Labs       No current pending labs.            Hospital Course  This is a 70 yo patient with PMH ESRD s/p 2 transplants currently on iHD Tu/Thu/Sat, HTN, T2DM, remote HCV infection s/p harvoni, and prostate cx s/p radical prostatectomy who presented to the ED for persistent R shoulder pain and large R shoulder joint effusion.     MRI obtained on 6/3 found displaced coracoid process fracture (w/o evidence of pathological fracture), massive rotator cuff tear, biceps tendon rupture, and severe glenohumeral osteoarthritis. Orthopedic surgery determined there would be no need for intervention inpatient. The etiology of this fracture is unclear, as he denies any trauma. ESRD related bony changes were discussed, including renal osteodystrophy and beta-2 microglobulin amyloid arthropathy though further diagnosis would be needed outpatient. He was given stepwise pain regimen with tylenol, oxycodone, and dilaudid for breakthrough pain. He " continued to have pain, therefore Lyrica was started 25 mg at bedtime on 6/6.     He received dialysis at his usual frequency (Tuesday, Thursday, Saturday). His hemoglobin decreased to 6.9 on 6/6 and he was give 1 unit of pRBC. He was started on darbepoetin, to be given once every two weeks (first dose 6/5/25). He reported that his pain was somewhat improved and he was discharged with lidocaine patches, lyrica, and percocet for pain management. He will follow up with the outpatient orthopedic doctor for further management of the shoulder injury.     Pertinent Physical Exam At Time of Discharge  Physical Exam  Constitutional:       Comments: Appear uncomfortable in bed from pain   Cardiovascular:      Rate and Rhythm: Normal rate and regular rhythm.      Heart sounds: Murmur heard.      Comments: 1/6 systolic murmur appreciated   Pulmonary:      Effort: Pulmonary effort is normal.      Breath sounds: Normal breath sounds.   Abdominal:      General: Abdomen is flat. There is no distension.      Palpations: Abdomen is soft.      Tenderness: There is no abdominal tenderness.   Musculoskeletal:         General: Swelling and deformity present.      Right lower leg: No edema.      Left lower leg: No edema.      Comments: Right shoulder: large effusion present, moderate pain with lateral raise of shoulder  R forearm and elbow with increased swelling today, no erythema    Skin:     General: Skin is warm and dry.      Comments: Fistula present in LUE    Neurological:      Mental Status: He is alert.     Outpatient Follow-Up  Future Appointments   Date Time Provider Department Center   6/18/2025 10:00 AM Vinicius Araiza MD [id number] [facility]   6/20/2025 10:45 AM Spencer Masetrson MD [id number] [place]   7/9/2025  8:40 AM Melia Qiu PA-C [id number] [facility]   7/30/2025  1:30 PM Bailey SIMPSON MD [id number] [facility]   8/25/2025 11:00 AM Daxa Cote DPM [id number] [facility]   9/8/2025  1:40 PM Chasidy Cabrales,  ANÍBAL-CNP [id number] [facility]   11/12/2025  9:20 AM Estella Quinonez MD [id number] [facility]   4/6/2026 10:20 AM Cali Mai MD [id number] [facility]   4/29/2026  2:30 PM ZAIRE Armas, [facility] [id number] [facility]  Internal Medicine, MS5  June 8th, 2025 11:26 AM

## 2025-06-05 NOTE — PROGRESS NOTES
"Transplant Nephrology progress note     Date of admission: 6/3/2025     Manolo Bashir is a 69 y.o.  with PMH Medical History[1]     SUBJECTIVE:    Still having pain in shoulder ,primary trying to consult Anesthesia .      PROBLEM LIST:  Assessment & Plan  ESRD (end stage renal disease) on dialysis (Multi)    ESRD (end stage renal disease) (Multi)           ALLERGIES:  Allergies[2]         CURRENT MEDICATIONS:  Scheduled medications  Scheduled Medications[3]  Continuous medications  Continuous Medications[4]  PRN medications  PRN Medications[5]       OBJECTIVE:    VITALS: Visit Vitals  BP (!) 169/91 (Patient Position: Lying)   Pulse 87   Temp 36.1 °C (97 °F)   Resp 16   Ht 1.727 m (5' 7.99\")   Wt 68 kg (149 lb 14.6 oz)   SpO2 98%   BMI 22.80 kg/m²   Smoking Status Never   BSA 1.81 m²        General: No distress   Mucosa moist   AI, AC, AF     HEENT: PEERLA  CVS: S1 S2 no murmurs  RESP:  Lungs clear to auscultation   ABDO: Soft, non-tender   Neuro: A + O x 3  Skin: No rash   Extremities: Right upper extremity with a swollen shoulder joint area       LABS:  Results from last 72 hours   Lab Units 06/05/25  1003 06/05/25  0957   WBC AUTO x10*3/uL  --  6.0   HEMOGLOBIN g/dL  --  7.0*   MCV fL  --  88   PLATELETS AUTO x10*3/uL  --  144*   BUN mg/dL 53*  --    CREATININE mg/dL 10.58*  --    CALCIUM mg/dL 8.8  --           No intake or output data in the 24 hours ending 06/05/25 5227       ASSESSMENT AND PLAN:    Manolo Bashir is a 69 y.o. with PMH ESRD s/p DDKT x 2 with failed first graft in 2006 and second kidney transplant in 2013 complicated by failed transplant as of May 2024 currently on Tuesday Thursday Saturday dialysis, MGUS , HTN, T2DM, remote HCV infection s/p harvoni, and prostate cx s/p radical prostatectomy who presented to the ED for persistent R shoulder pain and large R shoulder joint effusion.  Found to have subacute fracture of the coracoid process with full-thickness tearing of subscapularis, long head " biceps and glenohumeral joint effusion.    ESRD on dialysis Tuesday Thursday Saturday:  - Seen on HD this morning   - Current access is a left upper extremity AV fistula which is functioning.  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  No need to check tacrolimus levels.  - Can consider DARIANA for anemia of chronic disease.  - Blood pressures elevated likely due to poor pain control need further pain management.    Thank you for consulting .  Dina Benoit MD       Notes created by Antoine -Please excuse the Typos .                    [1]   Past Medical History:  Diagnosis Date    Anemia     Arthritis     Cataract     Chronic kidney disease, stage 3 unspecified (Multi) 09/26/2018    Stage 3 chronic kidney disease    CKD (chronic kidney disease)     stage V    Cough 02/12/2024    COVID-19 06/18/2020    COVID-19 virus infection    Diabetes (Multi)     ESRD (end stage renal disease) (Multi)     Focal and segmental proliferative glomerulonephritis 12/23/2023    HTN (hypertension)     Hyperlipidemia     Other long term (current) drug therapy 07/20/2021    High risk medication use    Personal history of other diseases of the circulatory system     Personal history of cardiac murmur    Personal history of other infectious and parasitic diseases 08/17/2015    History of hepatitis    Polyp, colonic 08/17/2023    Primary osteoarthritis of both ankles 08/17/2023    Prostate cancer (Multi)     Tubular adenoma of colon 08/17/2023    Unspecified kidney failure 08/17/2016    Renal failure   [2] No Known Allergies  [3] acetaminophen, 975 mg, oral, TID  carvedilol, 37.5 mg, oral, BID  heparin (porcine), 5,000 Units, subcutaneous, q8h  insulin glargine, 15 Units, subcutaneous, Nightly  insulin lispro, 0-10 Units, subcutaneous, TID AC  isosorbide mononitrate ER, 30 mg, oral, Daily  lidocaine, 1 patch, transdermal, Daily  metoclopramide, 5 mg, oral, TID AC  NIFEdipine ER, 90 mg,  oral, BID  pantoprazole, 40 mg, oral, Daily before breakfast  polyethylene glycol, 17 g, oral, Daily  pravastatin, 10 mg, oral, Nightly  predniSONE, 5 mg, oral, Daily  tacrolimus, 0.5 mg, oral, Daily  tacrolimus, , Topical, BID  vitamin B complex-vitamin C-folic acid, 1 capsule, oral, Daily     [4]    [5] PRN medications: dextrose, dextrose, glucagon, glucagon, HYDROmorphone, oxyCODONE, oxyCODONE, sodium chloride

## 2025-06-06 ENCOUNTER — TELEPHONE (OUTPATIENT)
Dept: ENDOCRINOLOGY | Facility: CLINIC | Age: 70
End: 2025-06-06
Payer: COMMERCIAL

## 2025-06-06 ENCOUNTER — APPOINTMENT (OUTPATIENT)
Dept: RADIOLOGY | Facility: HOSPITAL | Age: 70
End: 2025-06-06
Payer: COMMERCIAL

## 2025-06-06 DIAGNOSIS — R91.8 GROUND GLASS OPACITY PRESENT ON IMAGING OF LUNG: ICD-10-CM

## 2025-06-06 LAB
ABO GROUP (TYPE) IN BLOOD: NORMAL
ALBUMIN SERPL BCP-MCNC: 3 G/DL (ref 3.4–5)
ANION GAP SERPL CALC-SCNC: 13 MMOL/L (ref 10–20)
ANTIBODY SCREEN: NORMAL
BASOPHILS # BLD AUTO: 0.01 X10*3/UL (ref 0–0.1)
BASOPHILS NFR BLD AUTO: 0.2 %
BLOOD EXPIRATION DATE: NORMAL
BUN SERPL-MCNC: 27 MG/DL (ref 6–23)
CALCIUM SERPL-MCNC: 9.2 MG/DL (ref 8.6–10.6)
CHLORIDE SERPL-SCNC: 96 MMOL/L (ref 98–107)
CO2 SERPL-SCNC: 32 MMOL/L (ref 21–32)
CREAT SERPL-MCNC: 6.76 MG/DL (ref 0.5–1.3)
DISPENSE STATUS: NORMAL
EGFRCR SERPLBLD CKD-EPI 2021: 8 ML/MIN/1.73M*2
EOSINOPHIL # BLD AUTO: 0.18 X10*3/UL (ref 0–0.7)
EOSINOPHIL NFR BLD AUTO: 4.1 %
ERYTHROCYTE [DISTWIDTH] IN BLOOD BY AUTOMATED COUNT: 16.2 % (ref 11.5–14.5)
GLUCOSE BLD MANUAL STRIP-MCNC: 112 MG/DL (ref 74–99)
GLUCOSE BLD MANUAL STRIP-MCNC: 140 MG/DL (ref 74–99)
GLUCOSE BLD MANUAL STRIP-MCNC: 192 MG/DL (ref 74–99)
GLUCOSE BLD MANUAL STRIP-MCNC: 197 MG/DL (ref 74–99)
GLUCOSE BLD MANUAL STRIP-MCNC: 39 MG/DL (ref 74–99)
GLUCOSE SERPL-MCNC: 119 MG/DL (ref 74–99)
HCT VFR BLD AUTO: 22.4 % (ref 41–52)
HGB BLD-MCNC: 6.9 G/DL (ref 13.5–17.5)
IMM GRANULOCYTES # BLD AUTO: 0.01 X10*3/UL (ref 0–0.7)
IMM GRANULOCYTES NFR BLD AUTO: 0.2 % (ref 0–0.9)
LYMPHOCYTES # BLD AUTO: 0.62 X10*3/UL (ref 1.2–4.8)
LYMPHOCYTES NFR BLD AUTO: 14.1 %
MAGNESIUM SERPL-MCNC: 2.12 MG/DL (ref 1.6–2.4)
MCH RBC QN AUTO: 27 PG (ref 26–34)
MCHC RBC AUTO-ENTMCNC: 30.8 G/DL (ref 32–36)
MCV RBC AUTO: 88 FL (ref 80–100)
MONOCYTES # BLD AUTO: 0.71 X10*3/UL (ref 0.1–1)
MONOCYTES NFR BLD AUTO: 16.1 %
NEUTROPHILS # BLD AUTO: 2.87 X10*3/UL (ref 1.2–7.7)
NEUTROPHILS NFR BLD AUTO: 65.3 %
NRBC BLD-RTO: 0 /100 WBCS (ref 0–0)
PHOSPHATE SERPL-MCNC: 4.6 MG/DL (ref 2.5–4.9)
PLATELET # BLD AUTO: 105 X10*3/UL (ref 150–450)
POTASSIUM SERPL-SCNC: 4.5 MMOL/L (ref 3.5–5.3)
PRODUCT BLOOD TYPE: 5100
PRODUCT CODE: NORMAL
RBC # BLD AUTO: 2.56 X10*6/UL (ref 4.5–5.9)
RH FACTOR (ANTIGEN D): NORMAL
SODIUM SERPL-SCNC: 136 MMOL/L (ref 136–145)
UNIT ABO: NORMAL
UNIT NUMBER: NORMAL
UNIT RH: NORMAL
UNIT VOLUME: 350
WBC # BLD AUTO: 4.4 X10*3/UL (ref 4.4–11.3)
XM INTEP: NORMAL

## 2025-06-06 PROCEDURE — 36430 TRANSFUSION BLD/BLD COMPNT: CPT

## 2025-06-06 PROCEDURE — 80069 RENAL FUNCTION PANEL: CPT

## 2025-06-06 PROCEDURE — 1100000001 HC PRIVATE ROOM DAILY

## 2025-06-06 PROCEDURE — 36415 COLL VENOUS BLD VENIPUNCTURE: CPT

## 2025-06-06 PROCEDURE — 82947 ASSAY GLUCOSE BLOOD QUANT: CPT

## 2025-06-06 PROCEDURE — 83735 ASSAY OF MAGNESIUM: CPT

## 2025-06-06 PROCEDURE — 2500000001 HC RX 250 WO HCPCS SELF ADMINISTERED DRUGS (ALT 637 FOR MEDICARE OP)

## 2025-06-06 PROCEDURE — 85025 COMPLETE CBC W/AUTO DIFF WBC: CPT

## 2025-06-06 PROCEDURE — 86900 BLOOD TYPING SEROLOGIC ABO: CPT

## 2025-06-06 PROCEDURE — 2500000002 HC RX 250 W HCPCS SELF ADMINISTERED DRUGS (ALT 637 FOR MEDICARE OP, ALT 636 FOR OP/ED)

## 2025-06-06 PROCEDURE — 2500000005 HC RX 250 GENERAL PHARMACY W/O HCPCS

## 2025-06-06 PROCEDURE — 86850 RBC ANTIBODY SCREEN: CPT

## 2025-06-06 PROCEDURE — P9040 RBC LEUKOREDUCED IRRADIATED: HCPCS

## 2025-06-06 PROCEDURE — 2500000004 HC RX 250 GENERAL PHARMACY W/ HCPCS (ALT 636 FOR OP/ED): Mod: TB

## 2025-06-06 PROCEDURE — 2500000004 HC RX 250 GENERAL PHARMACY W/ HCPCS (ALT 636 FOR OP/ED)

## 2025-06-06 PROCEDURE — 99232 SBSQ HOSP IP/OBS MODERATE 35: CPT | Performed by: INTERNAL MEDICINE

## 2025-06-06 RX ORDER — IBUPROFEN 400 MG/1
400 TABLET, FILM COATED ORAL
Status: CANCELLED | OUTPATIENT
Start: 2025-06-06

## 2025-06-06 RX ORDER — OXYCODONE HYDROCHLORIDE 5 MG/1
10 TABLET ORAL EVERY 8 HOURS PRN
Status: DISCONTINUED | OUTPATIENT
Start: 2025-06-06 | End: 2025-06-08

## 2025-06-06 RX ORDER — INSULIN GLARGINE 100 [IU]/ML
10 INJECTION, SOLUTION SUBCUTANEOUS DAILY
Status: DISCONTINUED | OUTPATIENT
Start: 2025-06-07 | End: 2025-06-08 | Stop reason: HOSPADM

## 2025-06-06 RX ORDER — INSULIN GLARGINE 100 [IU]/ML
10 INJECTION, SOLUTION SUBCUTANEOUS NIGHTLY
Status: DISCONTINUED | OUTPATIENT
Start: 2025-06-06 | End: 2025-06-06

## 2025-06-06 RX ORDER — PREGABALIN 25 MG/1
25 CAPSULE ORAL NIGHTLY
Status: DISCONTINUED | OUTPATIENT
Start: 2025-06-06 | End: 2025-06-08 | Stop reason: HOSPADM

## 2025-06-06 RX ADMIN — PRAVASTATIN SODIUM 10 MG: 20 TABLET ORAL at 22:12

## 2025-06-06 RX ADMIN — METOCLOPRAMIDE 5 MG: 5 TABLET ORAL at 06:12

## 2025-06-06 RX ADMIN — CARVEDILOL 37.5 MG: 25 TABLET, FILM COATED ORAL at 10:02

## 2025-06-06 RX ADMIN — ACETAMINOPHEN 975 MG: 325 TABLET ORAL at 17:04

## 2025-06-06 RX ADMIN — NIFEDIPINE 90 MG: 90 TABLET, FILM COATED, EXTENDED RELEASE ORAL at 10:02

## 2025-06-06 RX ADMIN — ACETAMINOPHEN 975 MG: 325 TABLET ORAL at 22:12

## 2025-06-06 RX ADMIN — NIFEDIPINE 90 MG: 90 TABLET, FILM COATED, EXTENDED RELEASE ORAL at 22:12

## 2025-06-06 RX ADMIN — OXYCODONE 10 MG: 5 TABLET ORAL at 17:12

## 2025-06-06 RX ADMIN — DEXTROSE MONOHYDRATE 25 G: 25 INJECTION, SOLUTION INTRAVENOUS at 03:56

## 2025-06-06 RX ADMIN — ASCORBIC ACID, THIAMINE MONONITRATE,RIBOFLAVIN, NIACINAMIDE, PYRIDOXINE HYDROCHLORIDE, FOLIC ACID, CYANOCOBALAMIN, BIOTIN, CALCIUM PANTOTHENATE, 1 CAPSULE: 100; 1.5; 1.7; 20; 10; 1; 6000; 150000; 5 CAPSULE, LIQUID FILLED ORAL at 10:01

## 2025-06-06 RX ADMIN — METOCLOPRAMIDE 5 MG: 5 TABLET ORAL at 17:14

## 2025-06-06 RX ADMIN — HYDROMORPHONE HYDROCHLORIDE 0.4 MG: 1 INJECTION, SOLUTION INTRAMUSCULAR; INTRAVENOUS; SUBCUTANEOUS at 02:38

## 2025-06-06 RX ADMIN — OXYCODONE 10 MG: 5 TABLET ORAL at 04:19

## 2025-06-06 RX ADMIN — PANTOPRAZOLE SODIUM 40 MG: 40 TABLET, DELAYED RELEASE ORAL at 06:12

## 2025-06-06 RX ADMIN — OXYCODONE 5 MG: 5 TABLET ORAL at 22:26

## 2025-06-06 RX ADMIN — TACROLIMUS OINTMENT 0.1%: 1 OINTMENT TOPICAL at 10:01

## 2025-06-06 RX ADMIN — HEPARIN SODIUM 5000 UNITS: 5000 INJECTION, SOLUTION INTRAVENOUS; SUBCUTANEOUS at 17:11

## 2025-06-06 RX ADMIN — METOCLOPRAMIDE 5 MG: 5 TABLET ORAL at 11:59

## 2025-06-06 RX ADMIN — TACROLIMUS 0.5 MG: 0.5 CAPSULE ORAL at 06:21

## 2025-06-06 RX ADMIN — CARVEDILOL 37.5 MG: 25 TABLET, FILM COATED ORAL at 22:14

## 2025-06-06 RX ADMIN — LIDOCAINE 1 PATCH: 4 PATCH TOPICAL at 11:59

## 2025-06-06 RX ADMIN — ACETAMINOPHEN 975 MG: 325 TABLET ORAL at 10:02

## 2025-06-06 RX ADMIN — INSULIN LISPRO 2 UNITS: 100 INJECTION, SOLUTION INTRAVENOUS; SUBCUTANEOUS at 17:14

## 2025-06-06 RX ADMIN — PREGABALIN 25 MG: 25 CAPSULE ORAL at 22:12

## 2025-06-06 RX ADMIN — PREDNISONE 5 MG: 5 TABLET ORAL at 10:02

## 2025-06-06 RX ADMIN — ISOSORBIDE MONONITRATE 30 MG: 30 TABLET, EXTENDED RELEASE ORAL at 10:01

## 2025-06-06 ASSESSMENT — PAIN SCALES - GENERAL
PAINLEVEL_OUTOF10: 5 - MODERATE PAIN
PAINLEVEL_OUTOF10: 6
PAINLEVEL_OUTOF10: 7
PAINLEVEL_OUTOF10: 6

## 2025-06-06 ASSESSMENT — PAIN DESCRIPTION - DESCRIPTORS
DESCRIPTORS: ACHING
DESCRIPTORS: ACHING

## 2025-06-06 ASSESSMENT — PAIN - FUNCTIONAL ASSESSMENT
PAIN_FUNCTIONAL_ASSESSMENT: 0-10

## 2025-06-06 ASSESSMENT — PAIN DESCRIPTION - ORIENTATION
ORIENTATION: RIGHT
ORIENTATION: RIGHT

## 2025-06-06 ASSESSMENT — PAIN DESCRIPTION - LOCATION
LOCATION: SHOULDER
LOCATION: SHOULDER

## 2025-06-06 NOTE — PROGRESS NOTES
Transitional Care Coordination Progress Note:  Patient was discussed during interdisciplinary rounds.  Team members present: medical team, and TCC.  Plan per medical team: Plan to work on pain control, nephrology consulted for guidance with medications.  Payer: [facility].  Status: Inpatient.  Discharge disposition: Anticipate discharge home with outpatient follow up. Pt will need meds to beds for new prescriptions.   Pt's dtr will provide transport home.  IMM completed today.  Potential barriers:  ADOD:  Care coordinator will continue to follow for discharge planning needs.     Linh Bueno MSN, RN-BC  Transitional Care Coordinator (TCC)  [phone]

## 2025-06-06 NOTE — CARE PLAN
The patient's goals for the shift include Patient will have no c/o of pain or rate pain <4 this shift    The clinical goals for the shift include patient will be free from fall during this shift    Over the shift, the patient did not make progress toward the following goals.   Problem: Pain - Adult  Goal: Verbalizes/displays adequate comfort level or baseline comfort level  Outcome: Progressing     Problem: Safety - Adult  Goal: Free from fall injury  Outcome: Progressing     Problem: Discharge Planning  Goal: Discharge to home or other facility with appropriate resources  Outcome: Progressing     Problem: Chronic Conditions and Co-morbidities  Goal: Patient's chronic conditions and co-morbidity symptoms are monitored and maintained or improved  Outcome: Progressing     Problem: Nutrition  Goal: Nutrient intake appropriate for maintaining nutritional needs  Outcome: Progressing     Problem: Fall/Injury  Goal: Not fall by end of shift  Outcome: Progressing  Goal: Be free from injury by end of the shift  Outcome: Progressing  Goal: Verbalize understanding of personal risk factors for fall in the hospital  Outcome: Progressing  Goal: Verbalize understanding of risk factor reduction measures to prevent injury from fall in the home  Outcome: Progressing  Goal: Use assistive devices by end of the shift  Outcome: Progressing  Goal: Pace activities to prevent fatigue by end of the shift  Outcome: Progressing     Problem: Respiratory  Goal: Clear secretions with interventions this shift  Outcome: Progressing  Goal: Minimize anxiety/maximize coping throughout shift  Outcome: Progressing  Goal: Minimal/no exertional discomfort or dyspnea this shift  Outcome: Progressing  Goal: No signs of respiratory distress (eg. Use of accessory muscles. Peds grunting)  Outcome: Progressing  Goal: Patent airway maintained this shift  Outcome: Progressing  Goal: Tolerate mechanical ventilation evidenced by VS/agitation level this  shift  Outcome: Progressing  Goal: Tolerate pulmonary toileting this shift  Outcome: Progressing  Goal: Verbalize decreased shortness of breath this shift  Outcome: Progressing  Goal: Wean oxygen to maintain O2 saturation per order/standard this shift  Outcome: Progressing  Goal: Increase self care and/or family involvement in next 24 hours  Outcome: Progressing     Problem: Skin  Goal: Decreased wound size/increased tissue granulation at next dressing change  Outcome: Progressing  Goal: Participates in plan/prevention/treatment measures  Outcome: Progressing  Goal: Prevent/manage excess moisture  Outcome: Progressing  Goal: Prevent/minimize sheer/friction injuries  Outcome: Progressing  Goal: Promote/optimize nutrition  Outcome: Progressing  Goal: Promote skin healing  Outcome: Progressing      Wound Care: Petrolatum

## 2025-06-06 NOTE — PROGRESS NOTES
"Subjective     Manolo Bashir is a 69 y.o. male on day 1 of admission presenting with R shoulder pain and large R shoulder effusion. 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    Interval history  -EMMA, hemodynamically stable, afebrile  -hypoglycemic at 39 yesterday, 39 at 4AM this morning, given juice and corrected to 140  -overnight team changed glargine to 10 units due to hypoglycemia  - continues to report pain level is 4-7, reports only dilaudid is helping  - hemoglobin now 6.9    Objective   Current Vitals  /63 (BP Location: Right arm, Patient Position: Lying)   Pulse 58   Temp 36.1 °C (97 °F) (Temporal)   Resp 16   Ht 1.727 m (5' 7.99\")   Wt 68 kg (149 lb 14.6 oz)   SpO2 98%   BMI 22.80 kg/m²      Physical Exam  Constitutional:       Comments: Appear uncomfortable in bed from pain   Cardiovascular:      Rate and Rhythm: Normal rate and regular rhythm.      Heart sounds: Murmur heard.      Comments: 1/6 systolic murmur appreciated   Pulmonary:      Effort: Pulmonary effort is normal.      Breath sounds: Normal breath sounds.   Abdominal:      General: Abdomen is flat. There is no distension.      Palpations: Abdomen is soft.      Tenderness: There is no abdominal tenderness.   Musculoskeletal:         General: Swelling and deformity present.      Right lower leg: No edema.      Left lower leg: No edema.      Comments: Right shoulder: large effusion present, moderate pain with lateral raise of shoulder    Skin:     General: Skin is warm and dry.      Comments: Fistula present in LUE    Neurological:      Mental Status: He is alert.       Relevant Results  Labs:  CBC: WBC 4.4 , HGB 6.9,   BMP: , K 4.7, Cl 95, HCO3 24, BUN 53, CR 10.58, Glu " 39  LFTS: AST 24 , ALT 15, ALKPHOS 85 , TBILI 0.7 , DBILI 0.1  TROP: 165  BNP: 2,479  COAGS: PT 12.6 , PTT 30  , INR 1.1  CALCIUM 8.8   MAG No results found for requested labs within last 365 days. ALB 2.8 LACTATE 0.9 PHOS No results found for requested labs within last 365 days. COVIDNo results found for requested labs within last 365 days.    Micro/culture data:  Susceptibility data from last 120 days.  Collected Organism   05/25/25 1807 Staphylococcus hominis   05/20/25 2220 Staphylococcus hominis     Imaging:  ECG 12 lead  Suspect arm lead reversal, interpretation assumes no reversal  Normal sinus rhythm  Right axis deviation  Nonspecific intraventricular block  Minimal voltage criteria for LVH, may be normal variant ( Levar product )  Abnormal ECG  When compared with ECG of 25-MAY-2025 21:29,  T wave inversion no longer evident in Inferior leads  T wave inversion no longer evident in Lateral leads    MR shoulder right w and wo IV contrast  Impression: 1. Displaced fracture of the coracoid process with smooth, scalloped  fracture margins and no evidence of a marrow-replacing lesion, making  a pathologic fracture unlikely. Mild underlying cortical scalloping  is likely chronic and may relate to a longstanding large right  shoulder joint effusion, which demonstrates heterogeneous T2  hypointense signal, suggestive of synovitis with hemosiderin  deposition.  2. Massive rotator cuff tearing involving the entire supraspinatus,  infraspinatus and superior subscapularis fibers.  3. Mild supraspinatus muscle atrophy with moderate edema involving  the supraspinatus, infraspinatus, and subscapularis muscles.  Additional feathery edema pattern within the visualized shoulder  musculature, suggestive of myositis or muscle strain. Recommend  clinical correlation.  4. The long head biceps tendon is torn and retracted with  superimposed tendinosis of the extra-articular long head biceps  tendon.  5. Severe glenohumeral joint  osteoarthrosis.      XR shoulder right 2+ views  FINDINGS:  Right shoulder, three views      Subacute fracture through the coracoid process is again seen with  sclerosis. There is no change in alignment. Previously seen impaction  fracture in the humeral head difficult to visualize radiographically.  Note is made of an unfused os acromiale. There is a and inferior  subluxation of the humeral head suggestive of underlying glenohumeral  joint effusion.      Impression: Subacute fracture through the coracoid process. Impaction fracture  through the humeral head difficult to visualized radiographically.  Inferior subluxation of the humeral head compatible with underlying  large glenohumeral joint effusion.    XR chest 2 views  XR CHEST 2 VIEWS;  6/3/2025 11:23 am    FINDINGS:  CARDIOMEDIASTINAL SILHOUETTE:  Cardiomediastinal silhouette is mildly enlarged.      LUNGS:  There is no consolidation or effusion. There is no edema      ABDOMEN:  No remarkable upper abdominal findings.      BONES:  No acute osseous changes.      Impression: 1.  No evidence of acute cardiopulmonary process.      MEDS:  Scheduled medications  Scheduled Medications[1]  Continuous medications  Continuous Medications[2]  PRN medications  PRN Medications[3]       Assessment/Plan     Brief summary: This is a 68 yo patient with PMH ESRD s/p 2 transplants currently on iHD Tu/Thu/Sat, HTN, T2DM, remote HCV infection s/p harvoni, and prostate cx s/p radical prostatectomy who presented to the ED for persistent R shoulder pain and large R shoulder joint effusion.     Updates 6/5:  - glargine changed to 10 units, AM dosing  - discuss NSAID and Lyrica for pain regimen with transplant nephrology  - continue oxycodone and dilaudid as needed for breakthrough pain  - 1 unit pRBC      #Acute on chronic R shoulder pain  #Subacute fracture of coracoid process, atraumatic  #Full-thickness tearing of subscapularis   #Long head biceps tendon tear  #Large glenohumeral  joint effusion  - though patient is immunocompromised, low concern for septic arthritis given stable ESR/CRP, no erythema or warmth at joint, previous aspirations show no growth from 5/16 and 5/20  - previous xray 4/30 showed hypertrophic bony and cystic changes, spurs at humeral head w/o acute fracture or dislocation  - unclear etiology of subacute coracoid process injury (pt denies trauma)  - MRI 6/3: displaced coracoid process fracture (w/o evidence of pathological fracture), massive rotator cuff tear, biceps tendon rupture, and severe glenohumeral osteoarthritis  - ddx for atraumatic fracture and degeneration: chronic degenerative arthropathy vs. beta-2 microglobulin amyloid arthropathy vs. renal osteodystrophy   - difficult to control pain, as he is becoming overly drowsy with oxycodone but in severe pain without medications   Plan:  - Orthopedics consulted, no acute intervention for shoulder, will follow outpatient with shoulder specialist  - Consult anesthesia pain team for recommendations regarding outpatient pain regimen   - Pain control:               - tylenol 975 mg Q6 scheduled   - oxycodone 5 mg Q4 for moderate pain              - oxycodone 10 mg Q6 PRN for severe pain              - dilaudid 0.4 mg Q3 PRN for breakthrough pain   - lidocaine patch Q12 PRN  - consider adding NSAID and/or Lyrica (discussing with nephrology)     #ESRD s/p renal transplant x2  #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  -initial transplant 1992 c/b allograft failure 2006, 2nd transplant 2013 c/b impaired allograft fxn  -currently on iHD since 05/2024 Tues/Thurs/Sat  - creatinine 6.14 upon admission  - received only 2 hours of 3.5 of dialysis 6/3 before pain lead him to present to ED  - LUE AV fistula  - hemoglobin now 6.9  - home regimen: tacrolimus 0.5 mg, prednisone 5 mg   Plan:  - Consult transplant nephrology   - iHD Tue/Thu/Sat while in patient unless labs indicate more frequently needed  - Continue tacro + prednisone   - pantoprazole 40 mg  OD  - transfuse Hgb <7, active type and screen and consent to be obtained  - darbepoetin once every two weeks started 6/5/25  - plan to give 1 unit pRBC 6/6/25     #T2DM  - Most recent A1C 8.5, 5/14/25  - Home regimen: 20 units glargine daily PM, 4 units TID with meals + sliding scale 4-20  - Hypoglycemic due to   Plan:  - 10 units glargine in AM  - No sliding scale needed in hospital so far, will continue to monitor BG closely and add prandial as needed     #HTN  #HLD  - SBP /DBP 50s  - most recent LDL 35, HDL 35.8, total cholesterol 80  - Home regimen: carvedilol 37.5 mg BID, nifedipine 90 mg BID, imdur 30 mg  - pravastatin 10 mg     Fluids: Replete PRN  Electrolytes: Keep mg >2, phos >3  and K >4  Nutrition:  Adult diet Regular   Antimicrobials:   DVT PPX: DVT: Unfractionated Heparin  GI ppx: continue home pantoprazole 40 mg OD  Bowel care: Miralax  Catheter:None  Lines:PIV  Oxygen:Room Air  Drips: None    Code Status: Full Code (confirmed on admission)   NOK:  Primary Emergency Contact: KETTY PLASENCIA, Erik Phone: [phone]     Shu Diego MS5  Internal Medicine [facility]  June 6th, 2025, 6:32 AM    This note is not finalized until attested by staff.         [1] acetaminophen, 975 mg, oral, TID  carvedilol, 37.5 mg, oral, BID  heparin (porcine), 5,000 Units, subcutaneous, q8h  insulin glargine, 10 Units, subcutaneous, Nightly  insulin lispro, 0-10 Units, subcutaneous, TID AC  isosorbide mononitrate ER, 30 mg, oral, Daily  lidocaine, 1 patch, transdermal, Daily  metoclopramide, 5 mg, oral, TID AC  NIFEdipine ER, 90 mg, oral, BID  pantoprazole, 40 mg, oral, Daily before breakfast  polyethylene glycol, 17 g, oral, Daily  pravastatin, 10 mg, oral, Nightly  predniSONE, 5 mg, oral, Daily  tacrolimus, 0.5 mg, oral, Daily  tacrolimus, , Topical, BID  vitamin B complex-vitamin C-folic acid, 1 capsule, oral, Daily     [2]    [3] PRN medications: dextrose, dextrose, glucagon, glucagon, HYDROmorphone,  oxyCODONE, oxyCODONE, sodium chloride

## 2025-06-06 NOTE — TELEPHONE ENCOUNTER
Spoke to patient about committee decision.  Explained decision and that he would get a letter.  Encouraged him to call with any questions.  Pt verbalized understanding.

## 2025-06-07 ENCOUNTER — APPOINTMENT (OUTPATIENT)
Dept: DIALYSIS | Facility: HOSPITAL | Age: 70
End: 2025-06-07
Payer: COMMERCIAL

## 2025-06-07 ENCOUNTER — APPOINTMENT (OUTPATIENT)
Dept: RADIOLOGY | Facility: HOSPITAL | Age: 70
DRG: 564 | End: 2025-06-07
Payer: COMMERCIAL

## 2025-06-07 PROBLEM — M79.89 ARM SWELLING: Status: ACTIVE | Noted: 2025-06-07

## 2025-06-07 LAB
ALBUMIN SERPL BCP-MCNC: 2.9 G/DL (ref 3.4–5)
ANION GAP SERPL CALC-SCNC: 16 MMOL/L (ref 10–20)
BASOPHILS # BLD AUTO: 0.02 X10*3/UL (ref 0–0.1)
BASOPHILS NFR BLD AUTO: 0.4 %
BUN SERPL-MCNC: 36 MG/DL (ref 6–23)
CALCIUM SERPL-MCNC: 8.8 MG/DL (ref 8.6–10.6)
CHLORIDE SERPL-SCNC: 96 MMOL/L (ref 98–107)
CO2 SERPL-SCNC: 29 MMOL/L (ref 21–32)
CREAT SERPL-MCNC: 8.5 MG/DL (ref 0.5–1.3)
EGFRCR SERPLBLD CKD-EPI 2021: 6 ML/MIN/1.73M*2
EOSINOPHIL # BLD AUTO: 0.23 X10*3/UL (ref 0–0.7)
EOSINOPHIL NFR BLD AUTO: 5.1 %
ERYTHROCYTE [DISTWIDTH] IN BLOOD BY AUTOMATED COUNT: 15.6 % (ref 11.5–14.5)
GLUCOSE BLD MANUAL STRIP-MCNC: 166 MG/DL (ref 74–99)
GLUCOSE BLD MANUAL STRIP-MCNC: 87 MG/DL (ref 74–99)
GLUCOSE SERPL-MCNC: 160 MG/DL (ref 74–99)
HCT VFR BLD AUTO: 24.3 % (ref 41–52)
HGB BLD-MCNC: 7.7 G/DL (ref 13.5–17.5)
IMM GRANULOCYTES # BLD AUTO: 0.03 X10*3/UL (ref 0–0.7)
IMM GRANULOCYTES NFR BLD AUTO: 0.7 % (ref 0–0.9)
LYMPHOCYTES # BLD AUTO: 0.74 X10*3/UL (ref 1.2–4.8)
LYMPHOCYTES NFR BLD AUTO: 16.6 %
MAGNESIUM SERPL-MCNC: 2.03 MG/DL (ref 1.6–2.4)
MCH RBC QN AUTO: 28.4 PG (ref 26–34)
MCHC RBC AUTO-ENTMCNC: 31.7 G/DL (ref 32–36)
MCV RBC AUTO: 90 FL (ref 80–100)
MONOCYTES # BLD AUTO: 0.56 X10*3/UL (ref 0.1–1)
MONOCYTES NFR BLD AUTO: 12.5 %
NEUTROPHILS # BLD AUTO: 2.89 X10*3/UL (ref 1.2–7.7)
NEUTROPHILS NFR BLD AUTO: 64.7 %
NRBC BLD-RTO: 0 /100 WBCS (ref 0–0)
PHOSPHATE SERPL-MCNC: 4.7 MG/DL (ref 2.5–4.9)
PLATELET # BLD AUTO: 112 X10*3/UL (ref 150–450)
POTASSIUM SERPL-SCNC: 5.5 MMOL/L (ref 3.5–5.3)
RBC # BLD AUTO: 2.71 X10*6/UL (ref 4.5–5.9)
SODIUM SERPL-SCNC: 135 MMOL/L (ref 136–145)
WBC # BLD AUTO: 4.5 X10*3/UL (ref 4.4–11.3)

## 2025-06-07 PROCEDURE — 2500000001 HC RX 250 WO HCPCS SELF ADMINISTERED DRUGS (ALT 637 FOR MEDICARE OP)

## 2025-06-07 PROCEDURE — 2500000002 HC RX 250 W HCPCS SELF ADMINISTERED DRUGS (ALT 637 FOR MEDICARE OP, ALT 636 FOR OP/ED)

## 2025-06-07 PROCEDURE — 83735 ASSAY OF MAGNESIUM: CPT

## 2025-06-07 PROCEDURE — 99233 SBSQ HOSP IP/OBS HIGH 50: CPT | Performed by: HOSPITALIST

## 2025-06-07 PROCEDURE — 8010000001 HC DIALYSIS - HEMODIALYSIS PER DAY

## 2025-06-07 PROCEDURE — 2500000005 HC RX 250 GENERAL PHARMACY W/O HCPCS

## 2025-06-07 PROCEDURE — 1100000001 HC PRIVATE ROOM DAILY

## 2025-06-07 PROCEDURE — 93971 EXTREMITY STUDY: CPT

## 2025-06-07 PROCEDURE — 2500000004 HC RX 250 GENERAL PHARMACY W/ HCPCS (ALT 636 FOR OP/ED)

## 2025-06-07 PROCEDURE — 36415 COLL VENOUS BLD VENIPUNCTURE: CPT

## 2025-06-07 PROCEDURE — 82947 ASSAY GLUCOSE BLOOD QUANT: CPT

## 2025-06-07 PROCEDURE — 80069 RENAL FUNCTION PANEL: CPT

## 2025-06-07 PROCEDURE — 2500000004 HC RX 250 GENERAL PHARMACY W/ HCPCS (ALT 636 FOR OP/ED): Mod: TB

## 2025-06-07 PROCEDURE — 93971 EXTREMITY STUDY: CPT | Performed by: RADIOLOGY

## 2025-06-07 PROCEDURE — RXMED WILLOW AMBULATORY MEDICATION CHARGE

## 2025-06-07 PROCEDURE — 85025 COMPLETE CBC W/AUTO DIFF WBC: CPT

## 2025-06-07 PROCEDURE — 99232 SBSQ HOSP IP/OBS MODERATE 35: CPT | Performed by: INTERNAL MEDICINE

## 2025-06-07 RX ORDER — OXYCODONE HYDROCHLORIDE 5 MG/1
5 TABLET ORAL EVERY 8 HOURS PRN
Qty: 15 TABLET | Refills: 0 | Status: CANCELLED | OUTPATIENT
Start: 2025-06-07

## 2025-06-07 RX ORDER — LIDOCAINE 560 MG/1
1 PATCH PERCUTANEOUS; TOPICAL; TRANSDERMAL DAILY
Qty: 1 PATCH | Refills: 1 | Status: SHIPPED | OUTPATIENT
Start: 2025-06-08 | End: 2025-07-08

## 2025-06-07 RX ORDER — ACETAMINOPHEN 325 MG/1
975 TABLET ORAL 3 TIMES DAILY
Qty: 270 TABLET | Refills: 1 | Status: CANCELLED | OUTPATIENT
Start: 2025-06-07

## 2025-06-07 RX ORDER — POLYETHYLENE GLYCOL 3350 17 G/17G
17 POWDER, FOR SOLUTION ORAL DAILY
Qty: 510 G | Refills: 1 | Status: SHIPPED | OUTPATIENT
Start: 2025-06-08

## 2025-06-07 RX ORDER — PREGABALIN 25 MG/1
25 CAPSULE ORAL NIGHTLY
Qty: 30 CAPSULE | Refills: 1 | Status: SHIPPED | OUTPATIENT
Start: 2025-06-07

## 2025-06-07 RX ORDER — OXYCODONE AND ACETAMINOPHEN 7.5; 325 MG/1; MG/1
1 TABLET ORAL EVERY 6 HOURS PRN
Qty: 15 TABLET | Refills: 0 | Status: SHIPPED | OUTPATIENT
Start: 2025-06-07 | End: 2025-06-21

## 2025-06-07 RX ORDER — INSULIN GLARGINE 100 [IU]/ML
INJECTION, SOLUTION SUBCUTANEOUS
Qty: 15 ML | Refills: 0 | Status: CANCELLED | OUTPATIENT
Start: 2025-06-07

## 2025-06-07 RX ADMIN — PREGABALIN 25 MG: 25 CAPSULE ORAL at 21:08

## 2025-06-07 RX ADMIN — TACROLIMUS OINTMENT 0.1%: 1 OINTMENT TOPICAL at 11:15

## 2025-06-07 RX ADMIN — ASCORBIC ACID, THIAMINE MONONITRATE,RIBOFLAVIN, NIACINAMIDE, PYRIDOXINE HYDROCHLORIDE, FOLIC ACID, CYANOCOBALAMIN, BIOTIN, CALCIUM PANTOTHENATE, 1 CAPSULE: 100; 1.5; 1.7; 20; 10; 1; 6000; 150000; 5 CAPSULE, LIQUID FILLED ORAL at 09:27

## 2025-06-07 RX ADMIN — NIFEDIPINE 90 MG: 90 TABLET, FILM COATED, EXTENDED RELEASE ORAL at 09:27

## 2025-06-07 RX ADMIN — ACETAMINOPHEN 975 MG: 325 TABLET ORAL at 21:09

## 2025-06-07 RX ADMIN — PANTOPRAZOLE SODIUM 40 MG: 40 TABLET, DELAYED RELEASE ORAL at 11:14

## 2025-06-07 RX ADMIN — TACROLIMUS OINTMENT 0.1%: 1 OINTMENT TOPICAL at 00:52

## 2025-06-07 RX ADMIN — HYDROMORPHONE HYDROCHLORIDE 0.4 MG: 1 INJECTION, SOLUTION INTRAMUSCULAR; INTRAVENOUS; SUBCUTANEOUS at 11:36

## 2025-06-07 RX ADMIN — LIDOCAINE 1 PATCH: 4 PATCH TOPICAL at 09:33

## 2025-06-07 RX ADMIN — OXYCODONE 10 MG: 5 TABLET ORAL at 17:55

## 2025-06-07 RX ADMIN — TACROLIMUS 0.5 MG: 0.5 CAPSULE ORAL at 09:28

## 2025-06-07 RX ADMIN — PRAVASTATIN SODIUM 10 MG: 20 TABLET ORAL at 21:08

## 2025-06-07 RX ADMIN — METOCLOPRAMIDE 5 MG: 5 TABLET ORAL at 09:27

## 2025-06-07 RX ADMIN — HYDROMORPHONE HYDROCHLORIDE 0.4 MG: 1 INJECTION, SOLUTION INTRAMUSCULAR; INTRAVENOUS; SUBCUTANEOUS at 21:41

## 2025-06-07 RX ADMIN — INSULIN LISPRO 2 UNITS: 100 INJECTION, SOLUTION INTRAVENOUS; SUBCUTANEOUS at 09:26

## 2025-06-07 RX ADMIN — CARVEDILOL 37.5 MG: 25 TABLET, FILM COATED ORAL at 21:23

## 2025-06-07 RX ADMIN — ISOSORBIDE MONONITRATE 30 MG: 30 TABLET, EXTENDED RELEASE ORAL at 09:27

## 2025-06-07 RX ADMIN — ACETAMINOPHEN 975 MG: 325 TABLET ORAL at 09:27

## 2025-06-07 RX ADMIN — CARVEDILOL 37.5 MG: 25 TABLET, FILM COATED ORAL at 09:27

## 2025-06-07 RX ADMIN — TACROLIMUS OINTMENT 0.1%: 1 OINTMENT TOPICAL at 21:24

## 2025-06-07 RX ADMIN — INSULIN GLARGINE 10 UNITS: 100 INJECTION, SOLUTION SUBCUTANEOUS at 09:26

## 2025-06-07 RX ADMIN — METOCLOPRAMIDE 5 MG: 5 TABLET ORAL at 17:56

## 2025-06-07 RX ADMIN — PREDNISONE 5 MG: 5 TABLET ORAL at 09:28

## 2025-06-07 RX ADMIN — NIFEDIPINE 90 MG: 90 TABLET, FILM COATED, EXTENDED RELEASE ORAL at 21:08

## 2025-06-07 RX ADMIN — METOCLOPRAMIDE 5 MG: 5 TABLET ORAL at 11:14

## 2025-06-07 RX ADMIN — OXYCODONE 10 MG: 5 TABLET ORAL at 09:28

## 2025-06-07 ASSESSMENT — COGNITIVE AND FUNCTIONAL STATUS - GENERAL
MOBILITY SCORE: 24
DAILY ACTIVITIY SCORE: 24

## 2025-06-07 ASSESSMENT — PAIN DESCRIPTION - DESCRIPTORS: DESCRIPTORS: ACHING

## 2025-06-07 ASSESSMENT — PAIN DESCRIPTION - LOCATION
LOCATION: SHOULDER
LOCATION: OTHER (COMMENT)
LOCATION: SHOULDER

## 2025-06-07 ASSESSMENT — PAIN - FUNCTIONAL ASSESSMENT
PAIN_FUNCTIONAL_ASSESSMENT: 0-10

## 2025-06-07 ASSESSMENT — PAIN SCALES - GENERAL
PAINLEVEL_OUTOF10: 4
PAINLEVEL_OUTOF10: 9
PAINLEVEL_OUTOF10: 2
PAINLEVEL_OUTOF10: 9
PAINLEVEL_OUTOF10: 9

## 2025-06-07 ASSESSMENT — PAIN DESCRIPTION - ORIENTATION
ORIENTATION: RIGHT
ORIENTATION: RIGHT

## 2025-06-07 NOTE — PROGRESS NOTES
"Subjective     Manolo Bashir is a 69 y.o. male on day 1 of admission presenting with R shoulder pain and large R shoulder effusion. 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    Interval history  -EMMA, hemodynamically stable, afebrile  -no hypoglycemia overnight  -states pain is somewhat better but is fluctuating, feels he could go home     Objective   Current Vitals  /69   Pulse 76   Temp 37 °C (98.6 °F) (Tympanic)   Resp 18   Ht 1.727 m (5' 7.99\")   Wt 68 kg (149 lb 14.6 oz)   SpO2 98%   BMI 22.80 kg/m²      Physical Exam  Constitutional:       Comments: Appear uncomfortable in bed from pain   Cardiovascular:      Rate and Rhythm: Normal rate and regular rhythm.      Heart sounds: Murmur heard.      Comments: 1/6 systolic murmur appreciated   Pulmonary:      Effort: Pulmonary effort is normal.      Breath sounds: Normal breath sounds.   Abdominal:      General: Abdomen is flat. There is no distension.      Palpations: Abdomen is soft.      Tenderness: There is no abdominal tenderness.   Musculoskeletal:         General: Swelling and deformity present.      Right lower leg: No edema.      Left lower leg: No edema.      Comments: Right shoulder: large effusion present, moderate pain with lateral raise of shoulder  R forearm and elbow with increased swelling today, no erythema    Skin:     General: Skin is warm and dry.      Comments: Fistula present in LUE    Neurological:      Mental Status: He is alert.       Relevant Results  Labs:  CBC: WBC 4.4 , HGB 6.9,   BMP: , K 4.5, Cl 96, HCO3 32, BUN 27, CR 6.76, Glu 119  LFTS: AST 24 , ALT 15, ALKPHOS 85 , TBILI 0.7 , DBILI 0.1  TROP: 165  BNP: 2,479  COAGS: PT 12.6 , PTT 30  , INR 1.1  CALCIUM 9.2 "   MAG No results found for requested labs within last 365 days. ALB 3.0 LACTATE 0.9 PHOS No results found for requested labs within last 365 days. COVIDNo results found for requested labs within last 365 days.    Micro/culture data:  Susceptibility data from last 120 days.  Collected Organism   05/25/25 1807 Staphylococcus hominis   05/20/25 2220 Staphylococcus hominis     Imaging:  ECG 12 lead  Suspect arm lead reversal, interpretation assumes no reversal  Normal sinus rhythm  Right axis deviation  Nonspecific intraventricular block  Minimal voltage criteria for LVH, may be normal variant ( Levar product )  Abnormal ECG  When compared with ECG of 25-MAY-2025 21:29,  T wave inversion no longer evident in Inferior leads  T wave inversion no longer evident in Lateral leads    MR shoulder right w and wo IV contrast  Impression: 1. Displaced fracture of the coracoid process with smooth, scalloped  fracture margins and no evidence of a marrow-replacing lesion, making  a pathologic fracture unlikely. Mild underlying cortical scalloping  is likely chronic and may relate to a longstanding large right  shoulder joint effusion, which demonstrates heterogeneous T2  hypointense signal, suggestive of synovitis with hemosiderin  deposition.  2. Massive rotator cuff tearing involving the entire supraspinatus,  infraspinatus and superior subscapularis fibers.  3. Mild supraspinatus muscle atrophy with moderate edema involving  the supraspinatus, infraspinatus, and subscapularis muscles.  Additional feathery edema pattern within the visualized shoulder  musculature, suggestive of myositis or muscle strain. Recommend  clinical correlation.  4. The long head biceps tendon is torn and retracted with  superimposed tendinosis of the extra-articular long head biceps  tendon.  5. Severe glenohumeral joint osteoarthrosis.      XR shoulder right 2+ views  FINDINGS:  Right shoulder, three views      Subacute fracture through the coracoid  process is again seen with  sclerosis. There is no change in alignment. Previously seen impaction  fracture in the humeral head difficult to visualize radiographically.  Note is made of an unfused os acromiale. There is a and inferior  subluxation of the humeral head suggestive of underlying glenohumeral  joint effusion.      Impression: Subacute fracture through the coracoid process. Impaction fracture  through the humeral head difficult to visualized radiographically.  Inferior subluxation of the humeral head compatible with underlying  large glenohumeral joint effusion.    XR chest 2 views  XR CHEST 2 VIEWS;  6/3/2025 11:23 am    FINDINGS:  CARDIOMEDIASTINAL SILHOUETTE:  Cardiomediastinal silhouette is mildly enlarged.      LUNGS:  There is no consolidation or effusion. There is no edema      ABDOMEN:  No remarkable upper abdominal findings.      BONES:  No acute osseous changes.      Impression: 1.  No evidence of acute cardiopulmonary process.      MEDS:  Scheduled medications  Scheduled Medications[1]  Continuous medications  Continuous Medications[2]  PRN medications  PRN Medications[3]       Assessment/Plan     Brief summary: This is a 68 yo patient with PMH ESRD s/p 2 transplants currently on iHD Tu/Thu/Sat, HTN, T2DM, remote HCV infection s/p harvoni, and prostate cx s/p radical prostatectomy who presented to the ED for persistent R shoulder pain and large R shoulder joint effusion.     Updates 6/5:  -DVT US for R antecubital fossa swelling  -dialysis today     #Acute on chronic R shoulder pain  #Subacute fracture of coracoid process, atraumatic  #Full-thickness tearing of subscapularis   #Long head biceps tendon tear  #Large glenohumeral joint effusion  - though patient is immunocompromised, low concern for septic arthritis given stable ESR/CRP, no erythema or warmth at joint, previous aspirations show no growth from 5/16 and 5/20  - previous xray 4/30 showed hypertrophic bony and cystic changes, spurs at  humeral head w/o acute fracture or dislocation  - unclear etiology of subacute coracoid process injury (pt denies trauma)  - MRI 6/3: displaced coracoid process fracture (w/o evidence of pathological fracture), massive rotator cuff tear, biceps tendon rupture, and severe glenohumeral osteoarthritis  - ddx for atraumatic fracture and degeneration: chronic degenerative arthropathy vs. beta-2 microglobulin amyloid arthropathy vs. renal osteodystrophy   - difficult to control pain, as he is becoming overly drowsy with oxycodone but in severe pain without medications   Plan:  - Orthopedics consulted, no acute intervention for shoulder, will follow outpatient with shoulder specialist  - Pain control:               - tylenol 975 mg Q6 scheduled   - oxycodone 5 mg Q2 for moderate pain              - oxycodone 10 mg Q8 PRN for severe pain              - dilaudid 0.4 mg Q3 PRN for breakthrough pain   - Lyrica 25 mg at bedtime, can increase to 75 mg in one week   - lidocaine patch Q12 PRN   - will consider adding NSAID if no improvement     #ESRD s/p renal transplant x2  #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  -initial transplant 1992 c/b allograft failure 2006, 2nd transplant 2013 c/b impaired allograft fxn  -currently on iHD since 05/2024 Tues/Thurs/Sat  - creatinine 6.14 upon admission  - received only 2 hours of 3.5 of dialysis 6/3 before pain lead him to present to ED  - LUE AV fistula  - 1 unit pRBC 6/6/25  - home regimen: tacrolimus 0.5 mg, prednisone 5 mg   Plan:  - Consult transplant nephrology   - iHD Tue/Thu/Sat while in patient unless labs indicate more frequently needed  - Continue tacro + prednisone   - pantoprazole 40 mg OD  - transfuse Hgb <7, active type and screen and consent to be obtained  - darbepoetin once every two weeks started 6/5/25     #T2DM  - Most recent A1C 8.5, 5/14/25  - Home regimen: 20 units glargine daily PM, 4 units TID with meals + sliding scale 4-20  Plan:  - 10 units glargine in AM  - No sliding scale needed  in hospital so far, will continue to monitor BG closely and add prandial as needed     #HTN  #HLD  - SBP /DBP 50s  - most recent LDL 35, HDL 35.8, total cholesterol 80  - Home regimen: carvedilol 37.5 mg BID, nifedipine 90 mg BID, imdur 30 mg  - pravastatin 10 mg     Fluids: Replete PRN  Electrolytes: Keep mg >2, phos >3  and K >4  Nutrition:  Adult diet Regular   Antimicrobials:   DVT PPX: DVT: Unfractionated Heparin  GI ppx: continue home pantoprazole 40 mg OD  Bowel care: Miralax  Catheter:None  Lines:PIV  Oxygen:Room Air  Drips: None    Code Status: Full Code (confirmed on admission)   NOK:  Primary Emergency Contact: KETTY PLASENCIA, Home Phone: [phone]     Shu Diego, MS5  Internal Medicine [facility]  June 7th, 2025, 7:21 AM    This note is not finalized until attested by staff.         [1] acetaminophen, 975 mg, oral, TID  carvedilol, 37.5 mg, oral, BID  heparin (porcine), 5,000 Units, subcutaneous, q8h  insulin glargine, 10 Units, subcutaneous, Daily  insulin lispro, 0-10 Units, subcutaneous, TID AC  isosorbide mononitrate ER, 30 mg, oral, Daily  lidocaine, 1 patch, transdermal, Daily  metoclopramide, 5 mg, oral, TID AC  NIFEdipine ER, 90 mg, oral, BID  pantoprazole, 40 mg, oral, Daily before breakfast  polyethylene glycol, 17 g, oral, Daily  pravastatin, 10 mg, oral, Nightly  predniSONE, 5 mg, oral, Daily  pregabalin, 25 mg, oral, Nightly  tacrolimus, 0.5 mg, oral, Daily  tacrolimus, , Topical, BID  vitamin B complex-vitamin C-folic acid, 1 capsule, oral, Daily     [2]    [3] PRN medications: dextrose, dextrose, glucagon, glucagon, HYDROmorphone, oxyCODONE, oxyCODONE, sodium chloride

## 2025-06-07 NOTE — NURSING NOTE
Report to Receiving RN:    Report To: AYO Cline - busy with another pt/unable to come to the phone.  Report sent via secure chat.  Time Report Called: [id number]  Hand-Off Communication: Tolerated iHD well . Over 3.5 hours removed 2.5 liters.  Sept most of treatment, but easily awakened - alert.  Was able to stand for wt.  Ending /56 HR 72.  Complications During Treatment: No  Ultrafiltration Treatment: Yes, 2.5 liters removed  Medications Administered During Dialysis: No  Blood Products Administered During Dialysis: No  Labs Sent During Dialysis: No  Heparin Drip Rate Changes: N/A  Dialysis Catheter Dressing: na - AVG  Last Dressing Change: na    Last Updated: 4:32 PM by VASILIY ROSALES

## 2025-06-07 NOTE — PROGRESS NOTES
Poss DC today per Dr Kaye pending ultrasound result.  Confirmed with Hedy at [facility]   That pt is on the schedule for his usual time on Tuesday.  They were unaware he was in the hospital.  Informed Dr Kaye that pt will need meds to bed for any new medications.  He stated he was aware and   ordered meds at 140pm. If needed he said they would bring meds up.

## 2025-06-07 NOTE — CARE PLAN
The patient's goals for the shift include Patient will have no c/o of pain or rate pain <4 this shift    The clinical goals for the shift include Patient will verbalize adequate comfort level or baseline level for pain      Problem: Pain - Adult  Goal: Verbalizes/displays adequate comfort level or baseline comfort level  Outcome: Progressing     Problem: Safety - Adult  Goal: Free from fall injury  Outcome: Progressing     Problem: Discharge Planning  Goal: Discharge to home or other facility with appropriate resources  Outcome: Progressing     Problem: Chronic Conditions and Co-morbidities  Goal: Patient's chronic conditions and co-morbidity symptoms are monitored and maintained or improved  Outcome: Progressing     Problem: Nutrition  Goal: Nutrient intake appropriate for maintaining nutritional needs  Outcome: Progressing     Problem: Fall/Injury  Goal: Not fall by end of shift  Outcome: Progressing  Goal: Be free from injury by end of the shift  Outcome: Progressing  Goal: Verbalize understanding of personal risk factors for fall in the hospital  Outcome: Progressing  Goal: Verbalize understanding of risk factor reduction measures to prevent injury from fall in the home  Outcome: Progressing  Goal: Use assistive devices by end of the shift  Outcome: Progressing  Goal: Pace activities to prevent fatigue by end of the shift  Outcome: Progressing     Problem: Respiratory  Goal: Clear secretions with interventions this shift  Outcome: Progressing  Goal: Minimize anxiety/maximize coping throughout shift  Outcome: Progressing  Goal: Minimal/no exertional discomfort or dyspnea this shift  Outcome: Progressing  Goal: No signs of respiratory distress (eg. Use of accessory muscles. Peds grunting)  Outcome: Progressing  Goal: Patent airway maintained this shift  Outcome: Progressing  Goal: Tolerate mechanical ventilation evidenced by VS/agitation level this shift  Outcome: Progressing  Goal: Tolerate pulmonary toileting  this shift  Outcome: Progressing  Goal: Verbalize decreased shortness of breath this shift  Outcome: Progressing  Goal: Wean oxygen to maintain O2 saturation per order/standard this shift  Outcome: Progressing  Goal: Increase self care and/or family involvement in next 24 hours  Outcome: Progressing     Problem: Skin  Goal: Decreased wound size/increased tissue granulation at next dressing change  Outcome: Progressing  Goal: Participates in plan/prevention/treatment measures  Outcome: Progressing  Goal: Prevent/manage excess moisture  Outcome: Progressing  Goal: Prevent/minimize sheer/friction injuries  Outcome: Progressing  Goal: Promote/optimize nutrition  Outcome: Progressing  Goal: Promote skin healing  Outcome: Progressing     Problem: Diabetes  Goal: Achieve decreasing blood glucose levels by end of shift  Outcome: Progressing  Goal: Increase stability of blood glucose readings by end of shift  Outcome: Progressing  Goal: Decrease in ketones present in urine by end of shift  Outcome: Progressing  Goal: Maintain electrolyte levels within acceptable range throughout shift  Outcome: Progressing  Goal: Maintain glucose levels >70mg/dl to <250mg/dl throughout shift  Outcome: Progressing  Goal: No changes in neurological exam by end of shift  Outcome: Progressing  Goal: Learn about and adhere to nutrition recommendations by end of shift  Outcome: Progressing  Goal: Vital signs within normal range for age by end of shift  Outcome: Progressing  Goal: Increase self care and/or family involovement by end of shift  Outcome: Progressing  Goal: Receive DSME education by end of shift  Outcome: Progressing     Problem: Pain  Goal: Takes deep breaths with improved pain control throughout the shift  Outcome: Progressing  Goal: Turns in bed with improved pain control throughout the shift  Outcome: Progressing  Goal: Walks with improved pain control throughout the shift  Outcome: Progressing  Goal: Performs ADL's with improved  pain control throughout shift  Outcome: Progressing  Goal: Participates in PT with improved pain control throughout the shift  Outcome: Progressing  Goal: Free from opioid side effects throughout the shift  Outcome: Progressing  Goal: Free from acute confusion related to pain meds throughout the shift  Outcome: Progressing

## 2025-06-07 NOTE — NURSING NOTE
Report from Sending RN:    Report From: AYO Mann  Recent Surgery of Procedure: Yes, 6/7/25 DVT Vasc U/S for antecubital fossa swelling  Baseline Level of Consciousness (LOC): A/O X 4  Oxygen Use: No  Type: N/A  Diabetic: Yes, 166 BS at 166  Last BP Med Given Day of Dialysis: yes, see EMAR  Last Pain Med Given: yes see EMAR  Lab Tests to be Obtained with Dialysis: No  Blood Transfusion to be Given During Dialysis: No  Available IV Access: Yes  Medications to be Administered During Dialysis: No  Continuous IV Infusion Running: No  Restraints on Currently or in the Last 24 Hours: No  Hand-Off Communication: full code, no isolation, pt coming to HD from ultrasound, pt is able to stand safely on scale for a weight, travel by regular cart  Dialysis Catheter Dressing: pt has fistula  Last Dressing Change: N/A      Patient came to HD from ultrasound, nurse was unable to retrieve a new set of vitals

## 2025-06-07 NOTE — PROGRESS NOTES
"Transplant Nephrology progress note     Date of admission: 6/3/2025     Manolo Bashir is a 69 y.o.  with PMH Medical History[1]     SUBJECTIVE:    Still having pain in shoulder .      PROBLEM LIST:  Assessment & Plan  ESRD (end stage renal disease) on dialysis (Multi)    ESRD (end stage renal disease) (Multi)    Arm swelling           ALLERGIES:  Allergies[2]         CURRENT MEDICATIONS:  Scheduled medications  Scheduled Medications[3]  Continuous medications  Continuous Medications[4]  PRN medications  PRN Medications[5]       OBJECTIVE:    VITALS: Visit Vitals  /64   Pulse 72   Temp 35.9 °C (96.6 °F) (Temporal)   Resp 18   Ht 1.727 m (5' 7.99\")   Wt 68 kg (149 lb 14.6 oz)   SpO2 98%   BMI 22.80 kg/m²   Smoking Status Never   BSA 1.81 m²        General: No distress   Mucosa moist   AI, AC, AF     HEENT: PEERLA  CVS: S1 S2 no murmurs  RESP:  Lungs clear to auscultation   ABDO: Soft, non-tender   Neuro: A + O x 3  Skin: No rash   Extremities: Right upper extremity with a swollen shoulder joint area       LABS:  Results from last 72 hours   Lab Units 06/07/25  0637   WBC AUTO x10*3/uL 4.5   HEMOGLOBIN g/dL 7.7*   MCV fL 90   PLATELETS AUTO x10*3/uL 112*   BUN mg/dL 36*   CREATININE mg/dL 8.50*   CALCIUM mg/dL 8.8            Intake/Output Summary (Last 24 hours) at 6/7/2025 1638  Last data filed at 6/7/2025 1630  Gross per 24 hour   Intake 1150 ml   Output 2900 ml   Net -1750 ml          ASSESSMENT AND PLAN:    Manolo Bashir is a 69 y.o. with PMH ESRD s/p DDKT x 2 with failed first graft in 2006 and second kidney transplant in 2013 complicated by failed transplant as of May 2024 currently on Tuesday Thursday Saturday dialysis, MGUS , HTN, T2DM, remote HCV infection s/p harvoni, and prostate cx s/p radical prostatectomy who presented to the ED for persistent R shoulder pain and large R shoulder joint effusion.  Found to have subacute fracture of the coracoid process with full-thickness tearing of subscapularis, " long head biceps and glenohumeral joint effusion.    ESRD on dialysis Tuesday Thursday Saturday:  - Planning HD today.  - Current access is a left upper extremity AV fistula which is functioning.  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  No need to check tacrolimus levels.  - Can consider DARIANA for anemia of chronic disease.  - Blood pressures are optimal .    Thank you for consulting .  Dina Benoit MD       Notes created by Antoine -Please excuse the Typos .                    [1]   Past Medical History:  Diagnosis Date    Anemia     Arthritis     Cataract     Chronic kidney disease, stage 3 unspecified (Multi) 09/26/2018    Stage 3 chronic kidney disease    CKD (chronic kidney disease)     stage V    Cough 02/12/2024    COVID-19 06/18/2020    COVID-19 virus infection    Diabetes (Multi)     ESRD (end stage renal disease) (Multi)     Focal and segmental proliferative glomerulonephritis 12/23/2023    HTN (hypertension)     Hyperlipidemia     Other long term (current) drug therapy 07/20/2021    High risk medication use    Personal history of other diseases of the circulatory system     Personal history of cardiac murmur    Personal history of other infectious and parasitic diseases 08/17/2015    History of hepatitis    Polyp, colonic 08/17/2023    Primary osteoarthritis of both ankles 08/17/2023    Prostate cancer (Multi)     Tubular adenoma of colon 08/17/2023    Unspecified kidney failure 08/17/2016    Renal failure   [2] No Known Allergies  [3] acetaminophen, 975 mg, oral, TID  carvedilol, 37.5 mg, oral, BID  heparin (porcine), 5,000 Units, subcutaneous, q8h  insulin glargine, 10 Units, subcutaneous, Daily  insulin lispro, 0-10 Units, subcutaneous, TID AC  isosorbide mononitrate ER, 30 mg, oral, Daily  lidocaine, 1 patch, transdermal, Daily  metoclopramide, 5 mg, oral, TID AC  NIFEdipine ER, 90 mg, oral, BID  pantoprazole, 40 mg, oral, Daily before  breakfast  polyethylene glycol, 17 g, oral, Daily  pravastatin, 10 mg, oral, Nightly  predniSONE, 5 mg, oral, Daily  pregabalin, 25 mg, oral, Nightly  tacrolimus, 0.5 mg, oral, Daily  tacrolimus, , Topical, BID  vitamin B complex-vitamin C-folic acid, 1 capsule, oral, Daily     [4]    [5] PRN medications: dextrose, dextrose, glucagon, glucagon, HYDROmorphone, oxyCODONE, oxyCODONE, sodium chloride

## 2025-06-08 ENCOUNTER — PHARMACY VISIT (OUTPATIENT)
Dept: PHARMACY | Facility: CLINIC | Age: 70
End: 2025-06-08
Payer: COMMERCIAL

## 2025-06-08 VITALS
HEIGHT: 68 IN | DIASTOLIC BLOOD PRESSURE: 59 MMHG | OXYGEN SATURATION: 100 % | SYSTOLIC BLOOD PRESSURE: 136 MMHG | BODY MASS INDEX: 22.72 KG/M2 | WEIGHT: 149.91 LBS | HEART RATE: 72 BPM | RESPIRATION RATE: 13 BRPM | TEMPERATURE: 98.8 F

## 2025-06-08 LAB
ALBUMIN SERPL BCP-MCNC: 2.9 G/DL (ref 3.4–5)
ANION GAP SERPL CALC-SCNC: 13 MMOL/L (ref 10–20)
BASOPHILS # BLD AUTO: 0.02 X10*3/UL (ref 0–0.1)
BASOPHILS NFR BLD AUTO: 0.5 %
BUN SERPL-MCNC: 19 MG/DL (ref 6–23)
CALCIUM SERPL-MCNC: 8.9 MG/DL (ref 8.6–10.6)
CHLORIDE SERPL-SCNC: 98 MMOL/L (ref 98–107)
CO2 SERPL-SCNC: 31 MMOL/L (ref 21–32)
CREAT SERPL-MCNC: 6.33 MG/DL (ref 0.5–1.3)
EGFRCR SERPLBLD CKD-EPI 2021: 9 ML/MIN/1.73M*2
EOSINOPHIL # BLD AUTO: 0.24 X10*3/UL (ref 0–0.7)
EOSINOPHIL NFR BLD AUTO: 5.5 %
ERYTHROCYTE [DISTWIDTH] IN BLOOD BY AUTOMATED COUNT: 15.6 % (ref 11.5–14.5)
GLUCOSE BLD MANUAL STRIP-MCNC: 130 MG/DL (ref 74–99)
GLUCOSE BLD MANUAL STRIP-MCNC: 132 MG/DL (ref 74–99)
GLUCOSE SERPL-MCNC: 137 MG/DL (ref 74–99)
HCT VFR BLD AUTO: 25.8 % (ref 41–52)
HGB BLD-MCNC: 8 G/DL (ref 13.5–17.5)
IMM GRANULOCYTES # BLD AUTO: 0.02 X10*3/UL (ref 0–0.7)
IMM GRANULOCYTES NFR BLD AUTO: 0.5 % (ref 0–0.9)
LYMPHOCYTES # BLD AUTO: 0.6 X10*3/UL (ref 1.2–4.8)
LYMPHOCYTES NFR BLD AUTO: 13.7 %
MAGNESIUM SERPL-MCNC: 1.89 MG/DL (ref 1.6–2.4)
MCH RBC QN AUTO: 28.2 PG (ref 26–34)
MCHC RBC AUTO-ENTMCNC: 31 G/DL (ref 32–36)
MCV RBC AUTO: 91 FL (ref 80–100)
MONOCYTES # BLD AUTO: 0.45 X10*3/UL (ref 0.1–1)
MONOCYTES NFR BLD AUTO: 10.3 %
NEUTROPHILS # BLD AUTO: 3.04 X10*3/UL (ref 1.2–7.7)
NEUTROPHILS NFR BLD AUTO: 69.5 %
NRBC BLD-RTO: 0 /100 WBCS (ref 0–0)
PHOSPHATE SERPL-MCNC: 3.6 MG/DL (ref 2.5–4.9)
PLATELET # BLD AUTO: 113 X10*3/UL (ref 150–450)
POTASSIUM SERPL-SCNC: 4.4 MMOL/L (ref 3.5–5.3)
RBC # BLD AUTO: 2.84 X10*6/UL (ref 4.5–5.9)
SODIUM SERPL-SCNC: 138 MMOL/L (ref 136–145)
WBC # BLD AUTO: 4.4 X10*3/UL (ref 4.4–11.3)

## 2025-06-08 PROCEDURE — 2500000002 HC RX 250 W HCPCS SELF ADMINISTERED DRUGS (ALT 637 FOR MEDICARE OP, ALT 636 FOR OP/ED)

## 2025-06-08 PROCEDURE — 2500000005 HC RX 250 GENERAL PHARMACY W/O HCPCS

## 2025-06-08 PROCEDURE — 80069 RENAL FUNCTION PANEL: CPT

## 2025-06-08 PROCEDURE — 82947 ASSAY GLUCOSE BLOOD QUANT: CPT

## 2025-06-08 PROCEDURE — 36415 COLL VENOUS BLD VENIPUNCTURE: CPT

## 2025-06-08 PROCEDURE — 2500000001 HC RX 250 WO HCPCS SELF ADMINISTERED DRUGS (ALT 637 FOR MEDICARE OP)

## 2025-06-08 PROCEDURE — 85025 COMPLETE CBC W/AUTO DIFF WBC: CPT

## 2025-06-08 PROCEDURE — 2500000004 HC RX 250 GENERAL PHARMACY W/ HCPCS (ALT 636 FOR OP/ED)

## 2025-06-08 PROCEDURE — 83735 ASSAY OF MAGNESIUM: CPT

## 2025-06-08 RX ORDER — OXYCODONE HYDROCHLORIDE 5 MG/1
10 TABLET ORAL EVERY 8 HOURS PRN
Refills: 0 | Status: DISCONTINUED | OUTPATIENT
Start: 2025-06-08 | End: 2025-06-08 | Stop reason: HOSPADM

## 2025-06-08 RX ORDER — OXYCODONE HYDROCHLORIDE 5 MG/1
5 TABLET ORAL EVERY 2 HOUR PRN
Refills: 0 | Status: DISCONTINUED | OUTPATIENT
Start: 2025-06-08 | End: 2025-06-08 | Stop reason: HOSPADM

## 2025-06-08 RX ADMIN — OXYCODONE 10 MG: 5 TABLET ORAL at 05:31

## 2025-06-08 RX ADMIN — ASCORBIC ACID, THIAMINE MONONITRATE,RIBOFLAVIN, NIACINAMIDE, PYRIDOXINE HYDROCHLORIDE, FOLIC ACID, CYANOCOBALAMIN, BIOTIN, CALCIUM PANTOTHENATE, 1 CAPSULE: 100; 1.5; 1.7; 20; 10; 1; 6000; 150000; 5 CAPSULE, LIQUID FILLED ORAL at 09:23

## 2025-06-08 RX ADMIN — INSULIN GLARGINE 10 UNITS: 100 INJECTION, SOLUTION SUBCUTANEOUS at 09:26

## 2025-06-08 RX ADMIN — PREDNISONE 5 MG: 5 TABLET ORAL at 09:23

## 2025-06-08 RX ADMIN — METOCLOPRAMIDE 5 MG: 5 TABLET ORAL at 05:37

## 2025-06-08 RX ADMIN — ISOSORBIDE MONONITRATE 30 MG: 30 TABLET, EXTENDED RELEASE ORAL at 09:23

## 2025-06-08 RX ADMIN — METOCLOPRAMIDE 5 MG: 5 TABLET ORAL at 11:47

## 2025-06-08 RX ADMIN — ACETAMINOPHEN 975 MG: 325 TABLET ORAL at 09:24

## 2025-06-08 RX ADMIN — POLYETHYLENE GLYCOL 3350 17 G: 17 POWDER, FOR SOLUTION ORAL at 09:24

## 2025-06-08 RX ADMIN — LIDOCAINE 1 PATCH: 4 PATCH TOPICAL at 09:26

## 2025-06-08 RX ADMIN — NIFEDIPINE 90 MG: 90 TABLET, FILM COATED, EXTENDED RELEASE ORAL at 09:23

## 2025-06-08 RX ADMIN — CARVEDILOL 37.5 MG: 25 TABLET, FILM COATED ORAL at 09:23

## 2025-06-08 RX ADMIN — PANTOPRAZOLE SODIUM 40 MG: 40 TABLET, DELAYED RELEASE ORAL at 05:36

## 2025-06-08 RX ADMIN — TACROLIMUS 0.5 MG: 0.5 CAPSULE ORAL at 05:32

## 2025-06-08 RX ADMIN — TACROLIMUS OINTMENT 0.1%: 1 OINTMENT TOPICAL at 09:29

## 2025-06-08 ASSESSMENT — PAIN SCALES - GENERAL
PAINLEVEL_OUTOF10: 9
PAINLEVEL_OUTOF10: 2
PAINLEVEL_OUTOF10: 0 - NO PAIN

## 2025-06-08 ASSESSMENT — PAIN - FUNCTIONAL ASSESSMENT
PAIN_FUNCTIONAL_ASSESSMENT: 0-10
PAIN_FUNCTIONAL_ASSESSMENT: 0-10

## 2025-06-08 ASSESSMENT — PAIN DESCRIPTION - ORIENTATION: ORIENTATION: RIGHT

## 2025-06-08 ASSESSMENT — PAIN DESCRIPTION - LOCATION: LOCATION: SHOULDER

## 2025-06-08 NOTE — SIGNIFICANT EVENT
ORTHOPAEDIC SURGERY SIGNIFICANT EVENT NOTE     - MRI reviewed with Dr. Matias.   - Patient OK for DC and FU outpatient with Dr. Masterson for further discussion of operative management.     Jenn Galicia MD   Orthopaedic Surgery PGY1  [facility]

## 2025-06-08 NOTE — CARE PLAN
The patient's goals for the shift include Patient will have no c/o of pain or rate pain <4 this shift    The clinical goals for the shift include Pt have have pain managed throughout the shift     Over the shift, the patient's pain was managed. Pt tolerated all meds ordered. Pt in bed, bed in lowest position, call light within reach.

## 2025-06-08 NOTE — NURSING NOTE
Pt discharged from the unit in stable condition. Pt left the unit via wheelchair transported by staff. Pt left the unit with all personal items and meds to bed. Pt understands discharge instructions.

## 2025-06-08 NOTE — CARE PLAN
Problem: Pain - Adult  Goal: Verbalizes/displays adequate comfort level or baseline comfort level  Outcome: Progressing     Problem: Safety - Adult  Goal: Free from fall injury  Outcome: Progressing     Problem: Discharge Planning  Goal: Discharge to home or other facility with appropriate resources  Outcome: Progressing     Problem: Chronic Conditions and Co-morbidities  Goal: Patient's chronic conditions and co-morbidity symptoms are monitored and maintained or improved  Outcome: Progressing     Problem: Nutrition  Goal: Nutrient intake appropriate for maintaining nutritional needs  Outcome: Progressing     Problem: Fall/Injury  Goal: Not fall by end of shift  Outcome: Progressing  Goal: Be free from injury by end of the shift  Outcome: Progressing  Goal: Verbalize understanding of personal risk factors for fall in the hospital  Outcome: Progressing  Goal: Verbalize understanding of risk factor reduction measures to prevent injury from fall in the home  Outcome: Progressing  Goal: Use assistive devices by end of the shift  Outcome: Progressing  Goal: Pace activities to prevent fatigue by end of the shift  Outcome: Progressing     Problem: Respiratory  Goal: Clear secretions with interventions this shift  Outcome: Progressing  Goal: Minimize anxiety/maximize coping throughout shift  Outcome: Progressing  Goal: Minimal/no exertional discomfort or dyspnea this shift  Outcome: Progressing  Goal: No signs of respiratory distress (eg. Use of accessory muscles. Peds grunting)  Outcome: Progressing  Goal: Patent airway maintained this shift  Outcome: Progressing  Goal: Tolerate mechanical ventilation evidenced by VS/agitation level this shift  Outcome: Progressing  Goal: Tolerate pulmonary toileting this shift  Outcome: Progressing  Goal: Verbalize decreased shortness of breath this shift  Outcome: Progressing  Goal: Wean oxygen to maintain O2 saturation per order/standard this shift  Outcome: Progressing  Goal:  Increase self care and/or family involvement in next 24 hours  Outcome: Progressing     Problem: Skin  Goal: Decreased wound size/increased tissue granulation at next dressing change  Outcome: Progressing  Goal: Participates in plan/prevention/treatment measures  Outcome: Progressing  Goal: Prevent/manage excess moisture  Outcome: Progressing  Goal: Prevent/minimize sheer/friction injuries  Outcome: Progressing  Goal: Promote/optimize nutrition  Outcome: Progressing  Goal: Promote skin healing  Outcome: Progressing     Problem: Diabetes  Goal: Achieve decreasing blood glucose levels by end of shift  Outcome: Progressing  Goal: Increase stability of blood glucose readings by end of shift  Outcome: Progressing  Goal: Decrease in ketones present in urine by end of shift  Outcome: Progressing  Goal: Maintain electrolyte levels within acceptable range throughout shift  Outcome: Progressing  Goal: Maintain glucose levels >70mg/dl to <250mg/dl throughout shift  Outcome: Progressing  Goal: No changes in neurological exam by end of shift  Outcome: Progressing  Goal: Learn about and adhere to nutrition recommendations by end of shift  Outcome: Progressing  Goal: Vital signs within normal range for age by end of shift  Outcome: Progressing  Goal: Increase self care and/or family involovement by end of shift  Outcome: Progressing  Goal: Receive DSME education by end of shift  Outcome: Progressing     Problem: Pain  Goal: Takes deep breaths with improved pain control throughout the shift  Outcome: Progressing  Goal: Turns in bed with improved pain control throughout the shift  Outcome: Progressing  Goal: Walks with improved pain control throughout the shift  Outcome: Progressing  Goal: Performs ADL's with improved pain control throughout shift  Outcome: Progressing  Goal: Participates in PT with improved pain control throughout the shift  Outcome: Progressing  Goal: Free from opioid side effects throughout the shift  Outcome:  Progressing  Goal: Free from acute confusion related to pain meds throughout the shift  Outcome: Progressing   The patient's goals for the shift include Patient will have no c/o of pain or rate pain <4 this shift    The clinical goals for the shift include Pt will have contol pain during this shift

## 2025-06-08 NOTE — DOCUMENTATION CLARIFICATION NOTE
"    PATIENT:               KELVIN ROB  ACCT #:                  [id number]  MRN:                       [id number]  :                       1955  ADMIT DATE:       2025 5:05 PM  DISCH DATE:        2025 4:26 PM  RESPONDING PROVIDER #:        [id number]          PROVIDER RESPONSE TEXT:    I agree with dietician diagnosis of moderate malnutrition on 25    CDI QUERY TEXT:    Clarification        Instruction:    Based on your assessment of the patient and the clinical information, please provide the requested documentation by clicking on the appropriate radio button and enter any additional information if prompted.    Question: Please further clarify this patient nutritional status as    When answering this query, please exercise your independent professional judgment. The fact that a question is being asked, does not imply that any particular answer is desired or expected.    The patient's clinical indicators include:  Clinical Information:  69-year-old male with PMH failed renal transplant on HD, multiple admissions over past year requiring MICU admission presenting to the ED with positive blood cultures and mild hyperkalemia 5.5.     Clinical Indicators:  Nutrition Consult    BMI 21.79  ? Patient reports decreased appetite and PO intake since starting HD a year ago. Daughter also noted \"He doesn't have an appetite.?    ? Malnutrition Diagnosis: Moderate malnutrition related to chronic disease or condition  As Evidenced by: moderate fat loss and muscle wasting per physical exam, meeting < 75% of EER for > 1 month and 21% weight loss x 6 months ?    Treatment:  Diet liberalized to Regular d/t suboptimal oral intake.  Ensure (350 kcal and 13 g protein) ordered daily.  Vitamin D 50 mcg daily --low Vitamin D level (21).  Continue Renal MVI.    Risk Factors:  multiple admissions to MICU over past year, on HD, no appetitie  Options provided:  -- I agree with dietician diagnosis of moderate malnutrition " on 5/27/25  -- Other - I will add my own diagnosis  -- Refer to Clinical Documentation Reviewer    Query created by: Bee Cote on 6/4/2025 10:07 AM      Electronically signed by:  CECILIO GIVENS MD 6/8/2025 1:44 PM

## 2025-06-09 ENCOUNTER — PATIENT OUTREACH (OUTPATIENT)
Dept: CARE COORDINATION | Facility: CLINIC | Age: 70
End: 2025-06-09
Payer: COMMERCIAL

## 2025-06-09 ENCOUNTER — APPOINTMENT (OUTPATIENT)
Dept: RADIOLOGY | Facility: CLINIC | Age: 70
End: 2025-06-09
Payer: COMMERCIAL

## 2025-06-09 NOTE — PROGRESS NOTES
Outreach call to patient to support a smooth transition of care from recent admission. Unable to reach patient or lvm. Will continue to follow through transition period.    Judith Irvin RN, [facility] [place]  Phone [phone]

## 2025-06-10 ENCOUNTER — APPOINTMENT (OUTPATIENT)
Facility: CLINIC | Age: 70
End: 2025-06-10
Payer: COMMERCIAL

## 2025-06-13 DIAGNOSIS — R91.8 GROUND GLASS OPACITY PRESENT ON IMAGING OF LUNG: ICD-10-CM

## 2025-06-18 ENCOUNTER — PHARMACY VISIT (OUTPATIENT)
Dept: PHARMACY | Facility: CLINIC | Age: 70
End: 2025-06-18
Payer: COMMERCIAL

## 2025-06-18 ENCOUNTER — OFFICE VISIT (OUTPATIENT)
Dept: PAIN MEDICINE | Facility: HOSPITAL | Age: 70
End: 2025-06-18
Payer: COMMERCIAL

## 2025-06-18 ENCOUNTER — APPOINTMENT (OUTPATIENT)
Dept: DERMATOLOGY | Facility: CLINIC | Age: 70
End: 2025-06-18
Payer: MEDICARE

## 2025-06-18 DIAGNOSIS — Z94.0 IMMUNOSUPPRESSIVE MANAGEMENT ENCOUNTER FOLLOWING KIDNEY TRANSPLANT: ICD-10-CM

## 2025-06-18 DIAGNOSIS — L30.9 DERMATITIS: Primary | ICD-10-CM

## 2025-06-18 DIAGNOSIS — M12.811 ROTATOR CUFF ARTHROPATHY OF RIGHT SHOULDER: Primary | ICD-10-CM

## 2025-06-18 DIAGNOSIS — Z79.899 IMMUNOSUPPRESSIVE MANAGEMENT ENCOUNTER FOLLOWING KIDNEY TRANSPLANT: ICD-10-CM

## 2025-06-18 DIAGNOSIS — Z79.4 TYPE 2 DIABETES MELLITUS WITH STAGE 4 CHRONIC KIDNEY DISEASE, WITH LONG-TERM CURRENT USE OF INSULIN (MULTI): ICD-10-CM

## 2025-06-18 DIAGNOSIS — N18.4 TYPE 2 DIABETES MELLITUS WITH STAGE 4 CHRONIC KIDNEY DISEASE, WITH LONG-TERM CURRENT USE OF INSULIN (MULTI): ICD-10-CM

## 2025-06-18 DIAGNOSIS — E11.22 TYPE 2 DIABETES MELLITUS WITH STAGE 4 CHRONIC KIDNEY DISEASE, WITH LONG-TERM CURRENT USE OF INSULIN (MULTI): ICD-10-CM

## 2025-06-18 DIAGNOSIS — A63.0 CONDYLOMA: Primary | ICD-10-CM

## 2025-06-18 DIAGNOSIS — M75.121 COMPLETE TEAR OF RIGHT ROTATOR CUFF, UNSPECIFIED WHETHER TRAUMATIC: ICD-10-CM

## 2025-06-18 DIAGNOSIS — L30.9 DERMATITIS: ICD-10-CM

## 2025-06-18 PROCEDURE — 1159F MED LIST DOCD IN RCRD: CPT | Performed by: STUDENT IN AN ORGANIZED HEALTH CARE EDUCATION/TRAINING PROGRAM

## 2025-06-18 PROCEDURE — 99214 OFFICE O/P EST MOD 30 MIN: CPT | Performed by: PAIN MEDICINE

## 2025-06-18 PROCEDURE — 3052F HG A1C>EQUAL 8.0%<EQUAL 9.0%: CPT | Performed by: STUDENT IN AN ORGANIZED HEALTH CARE EDUCATION/TRAINING PROGRAM

## 2025-06-18 PROCEDURE — 1111F DSCHRG MED/CURRENT MED MERGE: CPT | Performed by: STUDENT IN AN ORGANIZED HEALTH CARE EDUCATION/TRAINING PROGRAM

## 2025-06-18 PROCEDURE — 99214 OFFICE O/P EST MOD 30 MIN: CPT | Performed by: STUDENT IN AN ORGANIZED HEALTH CARE EDUCATION/TRAINING PROGRAM

## 2025-06-18 PROCEDURE — RXMED WILLOW AMBULATORY MEDICATION CHARGE

## 2025-06-18 PROCEDURE — 54065 DESTRUCTION PENIS LESION(S): CPT | Performed by: STUDENT IN AN ORGANIZED HEALTH CARE EDUCATION/TRAINING PROGRAM

## 2025-06-18 RX ORDER — IMIQUIMOD 12.5 MG/.25G
CREAM TOPICAL
Qty: 30 PACKET | Refills: 3 | Status: SHIPPED | OUTPATIENT
Start: 2025-06-18

## 2025-06-18 RX ORDER — HYDROCODONE BITARTRATE AND ACETAMINOPHEN 5; 325 MG/1; MG/1
1 TABLET ORAL 2 TIMES DAILY PRN
Qty: 28 TABLET | Refills: 0 | Status: SHIPPED | OUTPATIENT
Start: 2025-06-18 | End: 2025-07-02

## 2025-06-18 ASSESSMENT — DERMATOLOGY QUALITY OF LIFE (QOL) ASSESSMENT
RATE HOW BOTHERED YOU ARE BY EFFECTS OF YOUR SKIN PROBLEMS ON YOUR ACTIVITIES (EG, GOING OUT, ACCOMPLISHING WHAT YOU WANT, WORK ACTIVITIES OR YOUR RELATIONSHIPS WITH OTHERS): 0 - NEVER BOTHERED
ARE THERE EXCLUSIONS OR EXCEPTIONS FOR THE QUALITY OF LIFE ASSESSMENT: NO
RATE HOW BOTHERED YOU ARE BY SYMPTOMS OF YOUR SKIN PROBLEM (EG, ITCHING, STINGING BURNING, HURTING OR SKIN IRRITATION): 0 - NEVER BOTHERED
RATE HOW EMOTIONALLY BOTHERED YOU ARE BY YOUR SKIN PROBLEM (FOR EXAMPLE, WORRY, EMBARRASSMENT, FRUSTRATION): 0 - NEVER BOTHERED
DATE THE QUALITY-OF-LIFE ASSESSMENT WAS COMPLETED: 67374
WHAT SINGLE SKIN CONDITION LISTED BELOW IS THE PATIENT ANSWERING THE QUALITY-OF-LIFE ASSESSMENT QUESTIONS ABOUT: DERMATITIS

## 2025-06-18 ASSESSMENT — DERMATOLOGY PATIENT ASSESSMENT
DO YOU HAVE ANY NEW OR CHANGING LESIONS: NO
DO YOU USE A TANNING BED: NO
ARE YOU AN ORGAN TRANSPLANT RECIPIENT: YES
HAVE YOU HAD OR DO YOU HAVE VASCULAR DISEASE: NO
HAVE YOU HAD OR DO YOU HAVE A STAPH INFECTION: NO

## 2025-06-18 ASSESSMENT — PAIN SCALES - GENERAL: PAINLEVEL_OUTOF10: 10 - WORST POSSIBLE PAIN

## 2025-06-18 ASSESSMENT — PAIN - FUNCTIONAL ASSESSMENT: PAIN_FUNCTIONAL_ASSESSMENT: 0-10

## 2025-06-18 ASSESSMENT — PATIENT GLOBAL ASSESSMENT (PGA): PATIENT GLOBAL ASSESSMENT: PATIENT GLOBAL ASSESSMENT:  2 - MILD

## 2025-06-18 ASSESSMENT — ITCH NUMERIC RATING SCALE: HOW SEVERE IS YOUR ITCHING?: 0

## 2025-06-18 NOTE — PROGRESS NOTES
Subjective   Patient ID: Manolo Bashir is a 69 y.o. male with a past medical history of hypertension, diabetes type 2, peripheral vascular disease, end-stage renal disease s/p renal transplant rejection of renal transplant, currently on dialysis via left AV graft, who presents here for evaluation of right shoulder pain.  He cannot say exactly when the pain started, but it has reduced his range of motion in all directions at the joint.  He previously attempted oral medications including pregabalin and hydrocodone-acetaminophen, but suffered nausea and vomiting.  He has not been able to tolerate physical therapy for the right shoulder, due to severe pain. Of note, he did see an orthopedic surgeon-Dr. Masterson who aspirated about 20 cc of blood-tinged fluid from the right shoulder and ordered MRI of the right shoulder joint.    Review of Systems   13-point ROS done and negative except for HPI.     Current Outpatient Medications   Medication Instructions    blood sugar diagnostic (True Metrix Glucose Test Strip) For BG check 4x/day    carvedilol (COREG) 37.5 mg, oral, 2 times daily, Hold for systolic BP <140 and HR <60. PATIENT NEEDS TO FOLLOW UP WITH CARDIOLOGY    clotrimazole (Lotrimin) 1 % cream Topical, 2 times daily    Easy Touch Alcohol Prep Pads pads, medicated     fluocinonide (Lidex) 0.05 % ointment Apply to affected areas twice daily when active as needed. Use less than 14 days per month.    Gvoke HypoPen 1-Pack 1 mg, intramuscular, Every 15 min PRN    imiquimod (Aldara) 5 % cream Use daily for 6 weeks on penis, right on that white and bumpy area  Put a layer about 2 quarter size wide on that area, very thin layer, and cover with vaseline    insulin glargine (Lantus) 100 unit/mL (3 mL) pen 20 units daily    insulin lispro (HumaLOG) 100 unit/mL pen 4 units with lunch and dinner plus a sliding scale up to 20 units daily    isosorbide mononitrate ER (IMDUR) 30 mg, oral, Daily, Do not crush or chew.    lancets 33  "gauge misc 1 each, miscellaneous, 3 times daily    lancing device misc use as directed    lidocaine 4 % patch 1 patch, transdermal, Daily, Remove & discard patch within 12 hours or as directed by MD.    metoclopramide (REGLAN) 5 mg, oral, 3 times daily before meals    naloxone (NARCAN) 4 mg, nasal, As needed, May repeat every 2-3 minutes if needed, alternating nostrils, until medical assistance becomes available.    NIFEdipine ER (ADALAT CC) 90 mg, oral, 2 times daily, Do not crush, chew, or split.    oxyCODONE-acetaminophen (Percocet) 7.5-325 mg tablet 1 tablet, oral, Every 6 hours PRN    pantoprazole (ProtoNix) 40 mg EC tablet Take 1 tablet (40 mg) by mouth once daily in the morning. Take before meals. Do not crush, chew, or split. Do not start before January 22, 2024.    pen needle, diabetic (TechLITE Pen Needle) 32 gauge x 5/32\" needle Use 4 a day as directed    polyethylene glycol (Glycolax, Miralax) 17 gram/dose powder Mix 17 g of powder and drink once daily. Do not fill before June 8, 2025.    pravastatin (PRAVACHOL) 10 mg, oral, Nightly    predniSONE (DELTASONE) 5 mg, oral, Daily    pregabalin (LYRICA) 25 mg, oral, Nightly    sevelamer carbonate (RENVELA) 800 mg, oral, 3 times daily before meals, Swallow tablet whole; do not crush, break, or chew.    sodium chloride (Ocean) 0.65 % nasal spray 1 spray, Each Nostril, 4 times daily PRN    syringe with needle 3 mL 25 x 5/8\" syringe Use to inject epogen.    tacrolimus (PROGRAF) 0.5 mg, oral, Daily    tacrolimus (Protopic) 0.1 % ointment Topical, 2 times daily    vitamin B complex-vitamin C-folic acid (Nephrocaps) 1 mg capsule 1 capsule, oral, Daily       Medical History[1]     Surgical History[2]     Family History[3]     RX Allergies[4]     Objective     There were no vitals filed for this visit.     Physical Exam  General: NAD, well groomed, well nourished  Eyes: Non-icteric sclera, EOMI  Ears, Nose, Mouth, and Throat: External ears and nose appear to be " without deformity or rash. No lesions or masses noted. Hearing is grossly intact.   Neck: Supple, trachea midline, no appreciable lumps or lymph nodes  Respiratory: Nonlabored breathing   Cardiovascular: No peripheral edema observed  Skin: No rashes or open lesions/ulcers identified on skin.  Psychiatric: Alert, orientation to person, place, and time. Cooperative.    Neurologic:   Cranial nerves grossly intact.   Strength: 5/5 and symmetric plantar/dorsiflexion   Sensation: Normal to light touch throughout; pinprick intact throughout.   DTRs:normal and symmetric throughout    Imaging personally reviewed and independently interpreted:   MR lumbar spine wo IV contrast 10/11/2023    Narrative  Interpreted By:  Harvey Calderón,  STUDY:  MR LUMBAR SPINE WO IV CONTRAST;  10/11/2023 11:15 pm    INDICATION:  <Reason For Exam> Right lumbosacral radiculopathy.    COMPARISON:  September 19.    ACCESSION NUMBER(S):  [id number]    ORDERING CLINICIAN:  BENNY MUNSON    TECHNIQUE:  The lumbar spine was studied in the sagittal and axial planes  utilizing T1 and T2 weighted images.    FINDINGS:  Marrow signal and vertebral body height are normal. Alignment is  normal.  The canal is normal in size and configuration.. There is no  measurable canal stenosis, focal disc herniation or nerve root  compression. The conus and sacrum are normal. The visualized  paraspinal soft tissues including the aorta are normal..    Other incidental findings as previously reported are unchanged  compared to the previous exam    Impression  MRI of the lumbar spine is within normal limits    There is no measurable change compared to the previous exam.    [place]AM[place] [facility] [place]    Signed by: Harvey Calderón 10/12/2023 7:14 AM  Dictation workstation:   [id number]     No image results found.     1. Rotator cuff arthropathy of right shoulder  Referral to Pain Medicine      2. Type 2 diabetes mellitus with stage 4 chronic kidney disease,  with long-term current use of insulin (Multi)        3. Immunosuppressive management encounter following kidney transplant        4. Complete tear of right rotator cuff, unspecified whether traumatic             Assessment/Plan   Manolo Bashir is a 69 y.o. male with a past medical history of hypertension, diabetes type 2, peripheral vascular disease, end-stage renal disease s/p renal transplant rejection of renal transplant, currently on dialysis via left AV graft, who presents here for evaluation of right shoulder pain.  MRI right shoulder did show a full-thickness tear of the right rotator cuff.  Patient has an appointment to follow-up with his orthopedic surgeon, Dr. Masterson, and will discuss surgical options with him.    Plan:  -Discontinue Percocet and Lyrica.  - Will give patient prescription of Norco 5 mg twice daily for 2 weeks.  - If no surgical intervention planned for right rotator cuff tear, may consider suprascapular nerve block versus peripheral nerve stimulator with Sprint device.      Follow up: As needed     The patient was invited to contact us back anytime with any questions or concerns and follow-up with us in the office as needed.     Diagnoses and all orders for this visit:  Rotator cuff arthropathy of right shoulder  -     Referral to Pain Medicine  Type 2 diabetes mellitus with stage 4 chronic kidney disease, with long-term current use of insulin (Multi)  Immunosuppressive management encounter following kidney transplant  Complete tear of right rotator cuff, unspecified whether traumatic      This note was generated with the aid of dictation software, there may be typos despite my attempts at proofreading.     Archana Ramirez MD  Interventional Pain Medicine Fellow  [facility]        [1]   Past Medical History:  Diagnosis Date    Anemia     Arthritis     Cataract     Chronic kidney disease, stage 3 unspecified (Multi) 09/26/2018    Stage 3 chronic kidney  disease    CKD (chronic kidney disease)     stage V    Cough 02/12/2024    COVID-19 06/18/2020    COVID-19 virus infection    Diabetes (Multi)     ESRD (end stage renal disease) (Multi)     Focal and segmental proliferative glomerulonephritis 12/23/2023    HTN (hypertension)     Hyperlipidemia     Other long term (current) drug therapy 07/20/2021    High risk medication use    Personal history of other diseases of the circulatory system     Personal history of cardiac murmur    Personal history of other infectious and parasitic diseases 08/17/2015    History of hepatitis    Polyp, colonic 08/17/2023    Primary osteoarthritis of both ankles 08/17/2023    Prostate cancer (Multi)     Tubular adenoma of colon 08/17/2023    Unspecified kidney failure 08/17/2016    Renal failure   [2]   Past Surgical History:  Procedure Laterality Date    ILEOSTOMY  04/25/2017    Ileostomy    ILEOSTOMY CLOSURE  08/17/2015    Ileostomy Closure    OTHER SURGICAL HISTORY  04/21/2017    Right Hemicolectomy    OTHER SURGICAL HISTORY  08/17/2015    Arteriovenous Surgery Creation Of A-V Fistula    OTHER SURGICAL HISTORY  08/17/2015    Sigmoidoscopy (Fiberoptic, Therapeutic )    PROSTATECTOMY  10/11/2013    Prostatectomy Radical    TRANSPLANT, KIDNEY, OPEN  1992    TRANSPLANT, KIDNEY, OPEN  2013    US GUIDED PERCUTANEOUS BIOPSY RENAL LEFT Left 11/20/2023    US GUIDED PERCUTANEOUS BIOPSY RENAL LEFT 11/20/2023 Lou Rodgers MD [facility]    US GUIDED PERCUTANEOUS PERITONEAL OR RETROPERITONEAL FLUID COLLECTION DRAINAGE  10/20/2022    US GUIDED PERCUTANEOUS PERITONEAL OR RETROPERITONEAL FLUID COLLECTION DRAINAGE 10/20/2022 [facility]ICAL LEGAC[facility]   [3]   Family History  Problem Relation Name Age of Onset    Bone cancer Mother      Other (corona's sarcome of the bone marrow) Mother      Prostate cancer Father      Diabetes Other Family Hist     Hypertension Other Family Hist    [4] No Known Allergies

## 2025-06-18 NOTE — PATIENT INSTRUCTIONS
Tyrole  Keep using the clobetasol for the scalp, if needed daily for itch    I am also sending you a medicine for the penis called aldara  You used it long time ago with success    You will need to use a thin layer of it right on top of that white and bumpy area on the penis nightly for 6 weeks  It will get red and itchy, its part of how it works  Keep vaseline on top of the medicine so it doesn't rub off into other parts of the penis    Otherwise, see me back In 2 month    I froze a few spots on the penis also that were genital warts    The cream Aldara is to treat the genital warts

## 2025-06-18 NOTE — Clinical Note
Multiple light pink pedunculated papules and plaques of the ventral penis and scrotum  Overall 80% improved on last visit but,    Today, worsened and very extensive, about 30 or so condyloma appearing papules, some are plaque like on ventral shaft, few on scrotum and dorsal shaft    Liquid nitrogen done to 12 lesions today on ventral shaft, dorsal shaft and scrotum  The large plaque like area will be treated with aldara

## 2025-06-18 NOTE — PROGRESS NOTES
Subjective     Manolo Bashir is a 69 y.o. male who presents for the following: Dermatitis (Follow up, medication has been maintaining the areas) and condyloma (Follow up).     Intake Questions  Do you have any new or changing Lesions?: No  Are you an organ transplant recipient?: Yes (kidney)  Have you had or do you have a Staph Infection?: No  Have you had or do you have Vacular Disease?: No  Do you use sunscreen?: None  Do you use a tanning bed?: No    Review of Systems:  No other skin or systemic complaints other than what is documented elsewhere in the note.    The following portions of the chart were reviewed this encounter and updated as appropriate:          Skin Cancer History  Biopsy Log Book  No skin cancers from Specimen Tracking.    Additional History      Specialty Problems          Dermatology Problems    Rash        Objective   Well appearing patient in no apparent distress; mood and affect are within normal limits.    A focused skin examination was performed. All findings within normal limits unless otherwise noted below.    Assessment/Plan   Skin Exam  1. CONDYLOMA  Pubic  Multiple light pink pedunculated papules and plaques of the ventral penis and scrotum  Overall 80% improved on last visit but,    Today, worsened and very extensive, about 30 or so condyloma appearing papules, some are plaque like on ventral shaft, few on scrotum and dorsal shaft    Liquid nitrogen done to 12 lesions today on ventral shaft, dorsal shaft and scrotum  The large plaque like area will be treated with aldara    Patient is unsure if he ended up using aldara again, he doesn't remember  Ill print after visit summary to make sure he follows direction and ill see him in 2 month    We are re-starting Aldara    Part of me cannot rule out completely that some of his lesions, but definitely not all, are related to irritation- sort of a granulomatous dermatitis picture from maybe urine leakage? Its not suspicious based on  history, however    Recommend vaseline ointment bid   Related Medications  imiquimod (Aldara) 5 % cream  Use daily for 6 weeks on penis, right on that white and bumpy area  Put a layer about 2 quarter size wide on that area, very thin layer, and cover with vaseline  2. DERMATITIS  Dorsal Penile Shaft, Mid Frontal Scalp  No evidence of skin disease on the scalp.   Clobetasol solution is very helpful in controlling his itch  renewed  Related Medications  tacrolimus (Protopic) 0.1 % ointment  Apply topically 2 times a day.  fluocinonide (Lidex) 0.05 % ointment  Apply to affected areas twice daily when active as needed. Use less than 14 days per month.

## 2025-06-18 NOTE — Clinical Note
Patient is unsure if he ended up using aldara again, he doesn't remember  Ill print after visit summary to make sure he follows direction and ill see him in 2 month    We are re-starting Aldara    Part of me cannot rule out completely that some of his lesions, but definitely not all, are related to irritation- sort of a granulomatous dermatitis picture from maybe urine leakage? Its not suspicious based on history, however    Recommend vaseline ointment bid

## 2025-06-19 PROCEDURE — RXMED WILLOW AMBULATORY MEDICATION CHARGE

## 2025-06-19 RX ORDER — CLOBETASOL PROPIONATE 0.5 MG/ML
SOLUTION TOPICAL
Qty: 50 ML | Refills: 11 | Status: SHIPPED | OUTPATIENT
Start: 2025-06-19

## 2025-06-20 ENCOUNTER — PHARMACY VISIT (OUTPATIENT)
Dept: PHARMACY | Facility: CLINIC | Age: 70
End: 2025-06-20
Payer: COMMERCIAL

## 2025-06-20 ENCOUNTER — APPOINTMENT (OUTPATIENT)
Dept: ORTHOPEDIC SURGERY | Facility: CLINIC | Age: 70
End: 2025-06-20
Payer: COMMERCIAL

## 2025-06-20 VITALS — HEIGHT: 68 IN | BODY MASS INDEX: 22.73 KG/M2 | WEIGHT: 150 LBS

## 2025-06-20 DIAGNOSIS — M25.411 EFFUSION OF JOINT OF RIGHT SHOULDER: ICD-10-CM

## 2025-06-20 DIAGNOSIS — R06.02 SHORTNESS OF BREATH: ICD-10-CM

## 2025-06-20 DIAGNOSIS — M12.811 ROTATOR CUFF ARTHROPATHY OF RIGHT SHOULDER: Primary | ICD-10-CM

## 2025-06-20 DIAGNOSIS — R91.8 GROUND GLASS OPACITY PRESENT ON IMAGING OF LUNG: ICD-10-CM

## 2025-06-20 PROCEDURE — 1159F MED LIST DOCD IN RCRD: CPT | Performed by: ORTHOPAEDIC SURGERY

## 2025-06-20 PROCEDURE — 3008F BODY MASS INDEX DOCD: CPT | Performed by: ORTHOPAEDIC SURGERY

## 2025-06-20 PROCEDURE — 3052F HG A1C>EQUAL 8.0%<EQUAL 9.0%: CPT | Performed by: ORTHOPAEDIC SURGERY

## 2025-06-20 PROCEDURE — 1125F AMNT PAIN NOTED PAIN PRSNT: CPT | Performed by: ORTHOPAEDIC SURGERY

## 2025-06-20 PROCEDURE — 99213 OFFICE O/P EST LOW 20 MIN: CPT | Performed by: ORTHOPAEDIC SURGERY

## 2025-06-20 PROCEDURE — 1111F DSCHRG MED/CURRENT MED MERGE: CPT | Performed by: ORTHOPAEDIC SURGERY

## 2025-06-20 PROCEDURE — 1036F TOBACCO NON-USER: CPT | Performed by: ORTHOPAEDIC SURGERY

## 2025-06-20 PROCEDURE — RXMED WILLOW AMBULATORY MEDICATION CHARGE

## 2025-06-20 RX ORDER — LIDOCAINE 560 MG/1
PATCH PERCUTANEOUS; TOPICAL; TRANSDERMAL
Qty: 30 PATCH | Refills: 0 | OUTPATIENT
Start: 2025-06-20

## 2025-06-20 RX ORDER — OXYCODONE AND ACETAMINOPHEN 5; 325 MG/1; MG/1
1 TABLET ORAL EVERY 4 HOURS PRN
Qty: 28 TABLET | Refills: 0 | Status: SHIPPED | OUTPATIENT
Start: 2025-06-20 | End: 2025-06-27

## 2025-06-20 ASSESSMENT — PAIN - FUNCTIONAL ASSESSMENT: PAIN_FUNCTIONAL_ASSESSMENT: 0-10

## 2025-06-20 ASSESSMENT — PAIN SCALES - GENERAL: PAINLEVEL_OUTOF10: 8

## 2025-06-20 NOTE — PROGRESS NOTES
Follow-up shoulder MRI was obtained which shows that the coracoid fracture is unlikely to be pathologic he has findings consistent with rotator cuff arthropathy his shoulder pain is better  On examination he is still a bit swollen has moderate restriction mobility in all planes with crepitus  Impression rotator cuff arthropathy we discussed possible treatment with the arthroplasty given his health conditions he would be at higher risk for complication infection etc. and he is not interested in pursuing this will provide 1 more prescription for pain pills and maybe begin therapy in 2 weeks as the shoulder continues to calm down

## 2025-06-21 ENCOUNTER — PHARMACY VISIT (OUTPATIENT)
Dept: PHARMACY | Facility: CLINIC | Age: 70
End: 2025-06-21
Payer: COMMERCIAL

## 2025-06-21 ENCOUNTER — HOSPITAL ENCOUNTER (EMERGENCY)
Facility: HOSPITAL | Age: 70
Discharge: HOME | End: 2025-06-21
Attending: EMERGENCY MEDICINE
Payer: COMMERCIAL

## 2025-06-21 VITALS
DIASTOLIC BLOOD PRESSURE: 61 MMHG | BODY MASS INDEX: 22.73 KG/M2 | RESPIRATION RATE: 18 BRPM | WEIGHT: 150 LBS | OXYGEN SATURATION: 96 % | HEART RATE: 80 BPM | SYSTOLIC BLOOD PRESSURE: 199 MMHG | TEMPERATURE: 98.7 F | HEIGHT: 68 IN

## 2025-06-21 DIAGNOSIS — M25.511 CHRONIC RIGHT SHOULDER PAIN: Primary | ICD-10-CM

## 2025-06-21 DIAGNOSIS — G89.29 CHRONIC RIGHT SHOULDER PAIN: Primary | ICD-10-CM

## 2025-06-21 LAB — GLUCOSE BLD MANUAL STRIP-MCNC: 200 MG/DL (ref 74–99)

## 2025-06-21 PROCEDURE — 64418 NJX AA&/STRD SPRSCAP NRV: CPT | Mod: RT

## 2025-06-21 PROCEDURE — 99284 EMERGENCY DEPT VISIT MOD MDM: CPT | Performed by: EMERGENCY MEDICINE

## 2025-06-21 PROCEDURE — 2500000004 HC RX 250 GENERAL PHARMACY W/ HCPCS (ALT 636 FOR OP/ED)

## 2025-06-21 PROCEDURE — 2500000001 HC RX 250 WO HCPCS SELF ADMINISTERED DRUGS (ALT 637 FOR MEDICARE OP)

## 2025-06-21 PROCEDURE — RXMED WILLOW AMBULATORY MEDICATION CHARGE

## 2025-06-21 PROCEDURE — 82947 ASSAY GLUCOSE BLOOD QUANT: CPT

## 2025-06-21 PROCEDURE — 99283 EMERGENCY DEPT VISIT LOW MDM: CPT | Mod: 25 | Performed by: EMERGENCY MEDICINE

## 2025-06-21 PROCEDURE — 2500000005 HC RX 250 GENERAL PHARMACY W/O HCPCS

## 2025-06-21 PROCEDURE — 64450 NJX AA&/STRD OTHER PN/BRANCH: CPT | Performed by: EMERGENCY MEDICINE

## 2025-06-21 RX ORDER — GABAPENTIN 100 MG/1
100 CAPSULE ORAL ONCE
Status: COMPLETED | OUTPATIENT
Start: 2025-06-21 | End: 2025-06-21

## 2025-06-21 RX ORDER — CYCLOBENZAPRINE HCL 10 MG
5 TABLET ORAL ONCE
Status: COMPLETED | OUTPATIENT
Start: 2025-06-21 | End: 2025-06-21

## 2025-06-21 RX ORDER — LIDOCAINE HYDROCHLORIDE 10 MG/ML
10 INJECTION, SOLUTION INFILTRATION; PERINEURAL ONCE
Status: COMPLETED | OUTPATIENT
Start: 2025-06-21 | End: 2025-06-21

## 2025-06-21 RX ORDER — CYCLOBENZAPRINE HCL 10 MG
5 TABLET ORAL 2 TIMES DAILY PRN
Qty: 5 TABLET | Refills: 0 | Status: SHIPPED | OUTPATIENT
Start: 2025-06-21

## 2025-06-21 RX ORDER — LIDOCAINE 560 MG/1
1 PATCH PERCUTANEOUS; TOPICAL; TRANSDERMAL ONCE
Status: DISCONTINUED | OUTPATIENT
Start: 2025-06-21 | End: 2025-06-21 | Stop reason: HOSPADM

## 2025-06-21 RX ORDER — OXYCODONE HYDROCHLORIDE 5 MG/1
10 TABLET ORAL ONCE
Refills: 0 | Status: COMPLETED | OUTPATIENT
Start: 2025-06-21 | End: 2025-06-21

## 2025-06-21 RX ORDER — BUPIVACAINE HYDROCHLORIDE 5 MG/ML
10 INJECTION, SOLUTION EPIDURAL; INTRACAUDAL; PERINEURAL ONCE
Status: COMPLETED | OUTPATIENT
Start: 2025-06-21 | End: 2025-06-21

## 2025-06-21 RX ADMIN — LIDOCAINE 4% 1 PATCH: 40 PATCH TOPICAL at 10:45

## 2025-06-21 RX ADMIN — CYCLOBENZAPRINE 5 MG: 10 TABLET, FILM COATED ORAL at 10:46

## 2025-06-21 RX ADMIN — LIDOCAINE HYDROCHLORIDE 10 ML: 10 INJECTION, SOLUTION INFILTRATION; PERINEURAL at 11:58

## 2025-06-21 RX ADMIN — OXYCODONE 10 MG: 5 TABLET ORAL at 10:46

## 2025-06-21 RX ADMIN — BUPIVACAINE HYDROCHLORIDE 10 MG: 5 INJECTION, SOLUTION EPIDURAL; INTRACAUDAL at 11:58

## 2025-06-21 RX ADMIN — GABAPENTIN 100 MG: 100 CAPSULE ORAL at 10:46

## 2025-06-21 ASSESSMENT — PAIN SCALES - GENERAL
PAINLEVEL_OUTOF10: 0 - NO PAIN
PAINLEVEL_OUTOF10: 10 - WORST POSSIBLE PAIN

## 2025-06-21 ASSESSMENT — PAIN - FUNCTIONAL ASSESSMENT: PAIN_FUNCTIONAL_ASSESSMENT: 0-10

## 2025-06-21 ASSESSMENT — PAIN DESCRIPTION - LOCATION: LOCATION: SHOULDER

## 2025-06-21 ASSESSMENT — PAIN DESCRIPTION - ORIENTATION: ORIENTATION: RIGHT

## 2025-06-21 NOTE — DISCHARGE INSTRUCTIONS
You have been evaluated in the Emergency Department today for shoulder pain. Your evaluation, including thorough physical exam and history, suggests that your symptoms are due to your chronic shoulder pain.  We have performed a nerve block and given you multimodal pain therapy.  We have given you some new prescriptions to help manage your pain at home.  Please follow-up with pain management and your orthopedic doctors.    Please follow up with your primary care physician within two days. If you do not have a primary care doctor you may call [phone] to make an appointment.    Return to the Emergency Department if you experience chest pain, shortness of breath, new or worsening pain.. Return to the Emergency Department if you develop any new or worsening symptoms.   Thank you for choosing us for your care.

## 2025-06-21 NOTE — ED TRIAGE NOTES
Pt here for right shoulder pain x 3 months, gets dialysis t,th,sat. Fistula in mary, hx of rotator cuff tear to right shoulder. Saw pain management Wednesday and was prescribed Vicodin, but saw orthopedic surgeon yesterday who prescribed percocet but pt and wife were unable to fill prescription d/t vicodin being filled on Wednesday,

## 2025-06-21 NOTE — ED PROVIDER NOTES
History of Present Illness     History provided by: Patient  Limitations to History: None  External Records Reviewed:     HPI:  Manolo Bashir is a 69 y.o. male with past medical history of ESRD and chronic shoulder pain status post right rotator cuff tear presents to the emergency department with shoulder pain.  Patient was seen by pain management and prescribed Vicodin, saw his orthopedic surgeon who offered arthroscopy and arthroplasty however the patient declined.  They wrote a prescription for Percocet but the patient and the patient's wife are unable to fill the prescription due to the close interval to a Vicodin prescription being filled.  Patient denies any new symptoms including no numbness, weakness, tingling in the arm.  States the pain is persistently behind his right shoulder blade which is the usual location of his pain.  Denies any new trauma.  Denies chest pain or shortness of breath.    Physical Exam   Triage vitals:  T 36.8 °C (98.2 °F)  HR 81  /70  RR 16  O2 99 % None (Room air)    Patient is awake, alert, appears to be in pain but not in acute distress  Moving all 4 extremities without deficit or difficulty, does have full range of motion at the right shoulder and left shoulder, bilateral elbows, wrists, intrinsic function of the hand is normal bilaterally, sensation is normal in all dermatomes  Patient has a left upper extremity fistula with palpable thrill, no bleeding  Speaks in full sentences with no respiratory distress  2+ right radial pulse with no delay in capillary refill    Medical Decision Making & ED Course   Medical Decision Makin y.o. male hemodynamically stable presents to the emergency department for shoulder pain.  Patient states he has been unable to manage his pain at home.  He was offered an implantable pain pump which will be organized with the pain management service.  We gave him multimodal pain therapy here.  No concern for new process based on his history  and physical exam.  This is a presentation of chronic pain not appropriately managed at home on his outpatient regimen.  Will perform suprascapular nerve block, please see separate procedure note.  Ultimately the patient was discharged with referral back to pain medicine and orthopedic surgery.  He was given return precautions and discharged.  ----         Social Determinants of Health which Significantly Impact Care: None identified       Chronic conditions affecting the patient's care: See HPI    The patient was discussed with the following consultants/services: Please see ED course for consult transcript        ED Course:     Disposition   As a result of the work-up, the patient was discharged home.  he was informed of his diagnosis and instructed to come back with any concerns or worsening of condition.  he and was agreeable to the plan as discussed above.  he was given the opportunity to ask questions.  All of the patient's questions were answered.    Procedures   Procedures    Patient was seen and discussed with the attending of record.    Ceasar Francisco MD  Emergency Medicine     Ceasar Francisco MD  Resident  06/21/25 [id number]

## 2025-06-21 NOTE — ED PROCEDURE NOTE
Procedure  Nerve Block    Performed by: Jerrell Potter DO  Authorized by: Jerrell Potter DO    Consent:     Consent obtained:  Written    Consent given by:  Patient    Risks, benefits, and alternatives were discussed: yes      Risks discussed:  Allergic reaction, infection, nerve damage, swelling, bleeding, intravenous injection, pain and unsuccessful block    Alternatives discussed:  No treatment  Universal protocol:     Patient identity confirmed:  Verbally with patient and hospital-assigned identification number  Indications:     Indications:  Pain relief  Location:     Body area:  Upper extremity    Upper extremity nerve:  Suprascapular    Laterality:  Right  Pre-procedure details:     Skin preparation:  Chlorhexidine    Preparation: Patient was prepped and draped in usual sterile fashion    Skin anesthesia:     Skin anesthesia method:  Topical application    Topical anesthesia: Lidocaine patch.  Procedure details:     Block needle gauge:  21 G    Guidance: ultrasound      Anesthetic injected:  Bupivacaine 0.5% w/o epi and lidocaine 1% w/o epi    Steroid injected:  None    Additive injected:  None    Injection procedure:  Anatomic landmarks identified, incremental injection, negative aspiration for blood, anatomic landmarks palpated and introduced needle    Paresthesia:  None  Post-procedure details:     Dressing: Sterile bandage.    Outcome:  Pain unchanged    Procedure completion:  Tolerated               Jerrell Potter DO  Resident  06/21/25 [id number]

## 2025-06-23 ENCOUNTER — PATIENT MESSAGE (OUTPATIENT)
Dept: ORTHOPEDIC SURGERY | Facility: CLINIC | Age: 70
End: 2025-06-23

## 2025-06-23 ENCOUNTER — PATIENT OUTREACH (OUTPATIENT)
Dept: CARE COORDINATION | Facility: CLINIC | Age: 70
End: 2025-06-23
Payer: COMMERCIAL

## 2025-06-23 ENCOUNTER — HOSPITAL ENCOUNTER (OUTPATIENT)
Dept: RADIOLOGY | Facility: HOSPITAL | Age: 70
Discharge: HOME | End: 2025-06-23
Payer: COMMERCIAL

## 2025-06-23 DIAGNOSIS — J90 PLEURAL EFFUSION: ICD-10-CM

## 2025-06-23 DIAGNOSIS — E87.70 HYPERVOLEMIA, UNSPECIFIED HYPERVOLEMIA TYPE: ICD-10-CM

## 2025-06-23 DIAGNOSIS — M25.512 ACUTE PAIN OF LEFT SHOULDER: ICD-10-CM

## 2025-06-23 PROCEDURE — 71250 CT THORAX DX C-: CPT

## 2025-06-23 PROCEDURE — 71250 CT THORAX DX C-: CPT | Performed by: RADIOLOGY

## 2025-06-23 NOTE — PROGRESS NOTES
Outreach call to patient to support a smooth transition of care from recent admission. Unable to reach patient or lvm. Will continue to follow through transition period.    Judith Irvin RN, [facility] [place]  Phone [phone]

## 2025-06-23 NOTE — PROGRESS NOTES
Outreach call to patient for 2 week CM follow up. Unable to reach patient. Will continue to follow through transition period.    Judith Irvin RN, [facility] [place]  Phone [phone]

## 2025-06-25 LAB
GLUCOSE BLD MANUAL STRIP-MCNC: 115 MG/DL (ref 74–99)
GLUCOSE BLD MANUAL STRIP-MCNC: 176 MG/DL (ref 74–99)
GLUCOSE BLD MANUAL STRIP-MCNC: 202 MG/DL (ref 74–99)

## 2025-06-26 ENCOUNTER — HOSPITAL ENCOUNTER (OUTPATIENT)
Dept: RADIOLOGY | Facility: HOSPITAL | Age: 70
Discharge: HOME | End: 2025-06-26
Payer: COMMERCIAL

## 2025-06-26 DIAGNOSIS — M25.512 ACUTE PAIN OF LEFT SHOULDER: ICD-10-CM

## 2025-06-26 PROCEDURE — 73030 X-RAY EXAM OF SHOULDER: CPT | Mod: LEFT SIDE | Performed by: RADIOLOGY

## 2025-06-26 PROCEDURE — 73030 X-RAY EXAM OF SHOULDER: CPT | Mod: LT

## 2025-06-27 DIAGNOSIS — R91.8 GROUND GLASS OPACITY PRESENT ON IMAGING OF LUNG: ICD-10-CM

## 2025-06-27 RX ORDER — ISOSORBIDE MONONITRATE 30 MG/1
30 TABLET, EXTENDED RELEASE ORAL DAILY
Qty: 30 TABLET | Refills: 0 | Status: SHIPPED | OUTPATIENT
Start: 2025-06-27 | End: 2025-07-28

## 2025-07-02 ENCOUNTER — APPOINTMENT (OUTPATIENT)
Dept: RADIOLOGY | Facility: HOSPITAL | Age: 70
End: 2025-07-02
Payer: COMMERCIAL

## 2025-07-02 ENCOUNTER — HOSPITAL ENCOUNTER (INPATIENT)
Facility: HOSPITAL | Age: 70
LOS: 3 days | Discharge: HOME | End: 2025-07-05
Attending: EMERGENCY MEDICINE | Admitting: STUDENT IN AN ORGANIZED HEALTH CARE EDUCATION/TRAINING PROGRAM
Payer: COMMERCIAL

## 2025-07-02 ENCOUNTER — CLINICAL SUPPORT (OUTPATIENT)
Dept: EMERGENCY MEDICINE | Facility: HOSPITAL | Age: 70
End: 2025-07-02
Payer: COMMERCIAL

## 2025-07-02 DIAGNOSIS — M12.811 ROTATOR CUFF ARTHROPATHY OF RIGHT SHOULDER: ICD-10-CM

## 2025-07-02 DIAGNOSIS — J18.9 PNEUMONIA OF BOTH LUNGS DUE TO INFECTIOUS ORGANISM, UNSPECIFIED PART OF LUNG: ICD-10-CM

## 2025-07-02 DIAGNOSIS — J18.9 PNEUMONIA OF BOTH LOWER LOBES DUE TO INFECTIOUS ORGANISM: Primary | ICD-10-CM

## 2025-07-02 DIAGNOSIS — N18.6 ESRD (END STAGE RENAL DISEASE) (MULTI): ICD-10-CM

## 2025-07-02 DIAGNOSIS — I50.32 CHRONIC HEART FAILURE WITH PRESERVED EJECTION FRACTION: ICD-10-CM

## 2025-07-02 DIAGNOSIS — I50.9 HEART FAILURE, UNSPECIFIED HF CHRONICITY, UNSPECIFIED HEART FAILURE TYPE: ICD-10-CM

## 2025-07-02 LAB
ALBUMIN SERPL BCP-MCNC: 2.6 G/DL (ref 3.4–5)
ALBUMIN SERPL BCP-MCNC: 3.7 G/DL (ref 3.4–5)
ALP SERPL-CCNC: 93 U/L (ref 33–136)
ALT SERPL W P-5'-P-CCNC: 13 U/L (ref 10–52)
ANION GAP BLDV CALCULATED.4IONS-SCNC: 8 MMOL/L (ref 10–25)
ANION GAP SERPL CALC-SCNC: 11 MMOL/L (ref 10–20)
ANION GAP SERPL CALC-SCNC: 15 MMOL/L (ref 10–20)
AST SERPL W P-5'-P-CCNC: 25 U/L (ref 9–39)
BASE EXCESS BLDV CALC-SCNC: 4.2 MMOL/L (ref -2–3)
BASOPHILS # BLD AUTO: 0.02 X10*3/UL (ref 0–0.1)
BASOPHILS NFR BLD AUTO: 0.4 %
BILIRUB SERPL-MCNC: 0.6 MG/DL (ref 0–1.2)
BODY TEMPERATURE: 37 DEGREES CELSIUS
BUN SERPL-MCNC: 18 MG/DL (ref 6–23)
BUN SERPL-MCNC: 20 MG/DL (ref 6–23)
CA-I BLDV-SCNC: 1.34 MMOL/L (ref 1.1–1.33)
CALCIUM SERPL-MCNC: 10.5 MG/DL (ref 8.6–10.6)
CALCIUM SERPL-MCNC: 9.2 MG/DL (ref 8.6–10.6)
CARDIAC TROPONIN I PNL SERPL HS: 48 NG/L (ref 0–53)
CARDIAC TROPONIN I PNL SERPL HS: 50 NG/L (ref 0–53)
CHLORIDE BLDV-SCNC: 98 MMOL/L (ref 98–107)
CHLORIDE SERPL-SCNC: 94 MMOL/L (ref 98–107)
CHLORIDE SERPL-SCNC: 95 MMOL/L (ref 98–107)
CO2 SERPL-SCNC: 27 MMOL/L (ref 21–32)
CO2 SERPL-SCNC: 30 MMOL/L (ref 21–32)
CREAT SERPL-MCNC: 6.77 MG/DL (ref 0.5–1.3)
CREAT SERPL-MCNC: 8.01 MG/DL (ref 0.5–1.3)
EGFRCR SERPLBLD CKD-EPI 2021: 7 ML/MIN/1.73M*2
EGFRCR SERPLBLD CKD-EPI 2021: 8 ML/MIN/1.73M*2
EOSINOPHIL # BLD AUTO: 0.11 X10*3/UL (ref 0–0.7)
EOSINOPHIL NFR BLD AUTO: 2.3 %
ERYTHROCYTE [DISTWIDTH] IN BLOOD BY AUTOMATED COUNT: 15.4 % (ref 11.5–14.5)
FLUAV RNA RESP QL NAA+PROBE: NOT DETECTED
FLUBV RNA RESP QL NAA+PROBE: NOT DETECTED
GLUCOSE BLD MANUAL STRIP-MCNC: 112 MG/DL (ref 74–99)
GLUCOSE BLD MANUAL STRIP-MCNC: 188 MG/DL (ref 74–99)
GLUCOSE BLDV-MCNC: 198 MG/DL (ref 74–99)
GLUCOSE SERPL-MCNC: 188 MG/DL (ref 74–99)
GLUCOSE SERPL-MCNC: 207 MG/DL (ref 74–99)
HCO3 BLDV-SCNC: 30.2 MMOL/L (ref 22–26)
HCT VFR BLD AUTO: 32.1 % (ref 41–52)
HCT VFR BLD EST: 33 % (ref 41–52)
HGB BLD-MCNC: 10.6 G/DL (ref 13.5–17.5)
HGB BLDV-MCNC: 11 G/DL (ref 13.5–17.5)
IMM GRANULOCYTES # BLD AUTO: 0.03 X10*3/UL (ref 0–0.7)
IMM GRANULOCYTES NFR BLD AUTO: 0.6 % (ref 0–0.9)
INR PPP: 1.1 (ref 0.9–1.1)
LACTATE BLDV-SCNC: 1.9 MMOL/L (ref 0.4–2)
LACTATE SERPL-SCNC: 1.8 MMOL/L (ref 0.4–2)
LIPASE SERPL-CCNC: 10 U/L (ref 9–82)
LYMPHOCYTES # BLD AUTO: 0.76 X10*3/UL (ref 1.2–4.8)
LYMPHOCYTES NFR BLD AUTO: 16.2 %
MAGNESIUM SERPL-MCNC: 1.78 MG/DL (ref 1.6–2.4)
MCH RBC QN AUTO: 27.8 PG (ref 26–34)
MCHC RBC AUTO-ENTMCNC: 33 G/DL (ref 32–36)
MCV RBC AUTO: 84 FL (ref 80–100)
MONOCYTES # BLD AUTO: 0.31 X10*3/UL (ref 0.1–1)
MONOCYTES NFR BLD AUTO: 6.6 %
NEUTROPHILS # BLD AUTO: 3.46 X10*3/UL (ref 1.2–7.7)
NEUTROPHILS NFR BLD AUTO: 73.9 %
NRBC BLD-RTO: 0 /100 WBCS (ref 0–0)
OXYHGB MFR BLDV: 43.2 % (ref 45–75)
PCO2 BLDV: 51 MM HG (ref 41–51)
PH BLDV: 7.38 PH (ref 7.33–7.43)
PHOSPHATE SERPL-MCNC: 3.7 MG/DL (ref 2.5–4.9)
PHOSPHATE SERPL-MCNC: 3.8 MG/DL (ref 2.5–4.9)
PLATELET # BLD AUTO: 165 X10*3/UL (ref 150–450)
PO2 BLDV: 30 MM HG (ref 35–45)
POTASSIUM BLDV-SCNC: 6.3 MMOL/L (ref 3.5–5.3)
POTASSIUM SERPL-SCNC: 5.7 MMOL/L (ref 3.5–5.3)
POTASSIUM SERPL-SCNC: 5.7 MMOL/L (ref 3.5–5.3)
PROT SERPL-MCNC: 8.3 G/DL (ref 6.4–8.2)
PROTHROMBIN TIME: 11.9 SECONDS (ref 9.8–12.4)
RBC # BLD AUTO: 3.81 X10*6/UL (ref 4.5–5.9)
SAO2 % BLDV: 44 % (ref 45–75)
SARS-COV-2 RNA RESP QL NAA+PROBE: NOT DETECTED
SODIUM BLDV-SCNC: 130 MMOL/L (ref 136–145)
SODIUM SERPL-SCNC: 130 MMOL/L (ref 136–145)
SODIUM SERPL-SCNC: 130 MMOL/L (ref 136–145)
WBC # BLD AUTO: 4.7 X10*3/UL (ref 4.4–11.3)

## 2025-07-02 PROCEDURE — 85610 PROTHROMBIN TIME: CPT | Performed by: EMERGENCY MEDICINE

## 2025-07-02 PROCEDURE — 74177 CT ABD & PELVIS W/CONTRAST: CPT

## 2025-07-02 PROCEDURE — 84484 ASSAY OF TROPONIN QUANT: CPT | Performed by: EMERGENCY MEDICINE

## 2025-07-02 PROCEDURE — 83880 ASSAY OF NATRIURETIC PEPTIDE: CPT

## 2025-07-02 PROCEDURE — 2500000001 HC RX 250 WO HCPCS SELF ADMINISTERED DRUGS (ALT 637 FOR MEDICARE OP)

## 2025-07-02 PROCEDURE — 2500000005 HC RX 250 GENERAL PHARMACY W/O HCPCS

## 2025-07-02 PROCEDURE — 99285 EMERGENCY DEPT VISIT HI MDM: CPT | Mod: 25 | Performed by: EMERGENCY MEDICINE

## 2025-07-02 PROCEDURE — 36415 COLL VENOUS BLD VENIPUNCTURE: CPT | Performed by: EMERGENCY MEDICINE

## 2025-07-02 PROCEDURE — 85025 COMPLETE CBC W/AUTO DIFF WBC: CPT | Performed by: EMERGENCY MEDICINE

## 2025-07-02 PROCEDURE — 93010 ELECTROCARDIOGRAM REPORT: CPT | Performed by: PHYSICIAN ASSISTANT

## 2025-07-02 PROCEDURE — 2500000005 HC RX 250 GENERAL PHARMACY W/O HCPCS: Performed by: EMERGENCY MEDICINE

## 2025-07-02 PROCEDURE — 2500000001 HC RX 250 WO HCPCS SELF ADMINISTERED DRUGS (ALT 637 FOR MEDICARE OP): Performed by: EMERGENCY MEDICINE

## 2025-07-02 PROCEDURE — 93005 ELECTROCARDIOGRAM TRACING: CPT

## 2025-07-02 PROCEDURE — 96365 THER/PROPH/DIAG IV INF INIT: CPT

## 2025-07-02 PROCEDURE — 87040 BLOOD CULTURE FOR BACTERIA: CPT | Performed by: EMERGENCY MEDICINE

## 2025-07-02 PROCEDURE — 74177 CT ABD & PELVIS W/CONTRAST: CPT | Performed by: RADIOLOGY

## 2025-07-02 PROCEDURE — 99291 CRITICAL CARE FIRST HOUR: CPT | Performed by: PHYSICIAN ASSISTANT

## 2025-07-02 PROCEDURE — 2500000004 HC RX 250 GENERAL PHARMACY W/ HCPCS (ALT 636 FOR OP/ED)

## 2025-07-02 PROCEDURE — 82947 ASSAY GLUCOSE BLOOD QUANT: CPT

## 2025-07-02 PROCEDURE — 96367 TX/PROPH/DG ADDL SEQ IV INF: CPT

## 2025-07-02 PROCEDURE — 84132 ASSAY OF SERUM POTASSIUM: CPT

## 2025-07-02 PROCEDURE — 5A1D70Z PERFORMANCE OF URINARY FILTRATION, INTERMITTENT, LESS THAN 6 HOURS PER DAY: ICD-10-PCS | Performed by: INTERNAL MEDICINE

## 2025-07-02 PROCEDURE — 83735 ASSAY OF MAGNESIUM: CPT | Performed by: EMERGENCY MEDICINE

## 2025-07-02 PROCEDURE — 94640 AIRWAY INHALATION TREATMENT: CPT

## 2025-07-02 PROCEDURE — 71045 X-RAY EXAM CHEST 1 VIEW: CPT | Mod: FOREIGN READ | Performed by: RADIOLOGY

## 2025-07-02 PROCEDURE — 1200000002 HC GENERAL ROOM WITH TELEMETRY DAILY

## 2025-07-02 PROCEDURE — 84145 PROCALCITONIN (PCT): CPT

## 2025-07-02 PROCEDURE — 87081 CULTURE SCREEN ONLY: CPT

## 2025-07-02 PROCEDURE — 84100 ASSAY OF PHOSPHORUS: CPT | Performed by: EMERGENCY MEDICINE

## 2025-07-02 PROCEDURE — 2500000002 HC RX 250 W HCPCS SELF ADMINISTERED DRUGS (ALT 637 FOR MEDICARE OP, ALT 636 FOR OP/ED): Performed by: EMERGENCY MEDICINE

## 2025-07-02 PROCEDURE — 87636 SARSCOV2 & INF A&B AMP PRB: CPT | Performed by: EMERGENCY MEDICINE

## 2025-07-02 PROCEDURE — 84132 ASSAY OF SERUM POTASSIUM: CPT | Performed by: EMERGENCY MEDICINE

## 2025-07-02 PROCEDURE — 2500000004 HC RX 250 GENERAL PHARMACY W/ HCPCS (ALT 636 FOR OP/ED): Performed by: EMERGENCY MEDICINE

## 2025-07-02 PROCEDURE — 83605 ASSAY OF LACTIC ACID: CPT | Performed by: EMERGENCY MEDICINE

## 2025-07-02 PROCEDURE — 2500000002 HC RX 250 W HCPCS SELF ADMINISTERED DRUGS (ALT 637 FOR MEDICARE OP, ALT 636 FOR OP/ED)

## 2025-07-02 PROCEDURE — 99291 CRITICAL CARE FIRST HOUR: CPT | Mod: 25 | Performed by: EMERGENCY MEDICINE

## 2025-07-02 PROCEDURE — 96375 TX/PRO/DX INJ NEW DRUG ADDON: CPT

## 2025-07-02 PROCEDURE — 96366 THER/PROPH/DIAG IV INF ADDON: CPT

## 2025-07-02 PROCEDURE — 99223 1ST HOSP IP/OBS HIGH 75: CPT

## 2025-07-02 PROCEDURE — 71045 X-RAY EXAM CHEST 1 VIEW: CPT

## 2025-07-02 PROCEDURE — 83690 ASSAY OF LIPASE: CPT | Performed by: EMERGENCY MEDICINE

## 2025-07-02 PROCEDURE — 2550000001 HC RX 255 CONTRASTS: Performed by: EMERGENCY MEDICINE

## 2025-07-02 PROCEDURE — 71260 CT THORAX DX C+: CPT | Performed by: RADIOLOGY

## 2025-07-02 RX ORDER — INSULIN GLARGINE 100 [IU]/ML
10 INJECTION, SOLUTION SUBCUTANEOUS EVERY 24 HOURS
Status: DISCONTINUED | OUTPATIENT
Start: 2025-07-03 | End: 2025-07-04

## 2025-07-02 RX ORDER — ACETAMINOPHEN 160 MG/5ML
650 SOLUTION ORAL EVERY 4 HOURS PRN
Status: DISCONTINUED | OUTPATIENT
Start: 2025-07-02 | End: 2025-07-04

## 2025-07-02 RX ORDER — ONDANSETRON HYDROCHLORIDE 2 MG/ML
4 INJECTION, SOLUTION INTRAVENOUS ONCE
Status: COMPLETED | OUTPATIENT
Start: 2025-07-02 | End: 2025-07-02

## 2025-07-02 RX ORDER — PREGABALIN 25 MG/1
25 CAPSULE ORAL NIGHTLY
Status: CANCELLED | OUTPATIENT
Start: 2025-07-02

## 2025-07-02 RX ORDER — PRAVASTATIN SODIUM 10 MG/1
10 TABLET ORAL NIGHTLY
Status: CANCELLED | OUTPATIENT
Start: 2025-07-02

## 2025-07-02 RX ORDER — ALBUTEROL SULFATE 0.83 MG/ML
20 SOLUTION RESPIRATORY (INHALATION) ONCE
Status: COMPLETED | OUTPATIENT
Start: 2025-07-02 | End: 2025-07-02

## 2025-07-02 RX ORDER — CARVEDILOL 12.5 MG/1
37.5 TABLET ORAL 2 TIMES DAILY
Status: CANCELLED | OUTPATIENT
Start: 2025-07-02

## 2025-07-02 RX ORDER — ONDANSETRON HYDROCHLORIDE 2 MG/ML
4 INJECTION, SOLUTION INTRAVENOUS EVERY 4 HOURS PRN
Status: DISCONTINUED | OUTPATIENT
Start: 2025-07-02 | End: 2025-07-05 | Stop reason: HOSPADM

## 2025-07-02 RX ORDER — DEXTROSE 50 % IN WATER (D50W) INTRAVENOUS SYRINGE
25 ONCE
Status: COMPLETED | OUTPATIENT
Start: 2025-07-02 | End: 2025-07-02

## 2025-07-02 RX ORDER — ISOSORBIDE MONONITRATE 30 MG/1
30 TABLET, EXTENDED RELEASE ORAL DAILY
Status: CANCELLED | OUTPATIENT
Start: 2025-07-02

## 2025-07-02 RX ORDER — DEXTROSE 50 % IN WATER (D50W) INTRAVENOUS SYRINGE
12.5
Status: DISCONTINUED | OUTPATIENT
Start: 2025-07-02 | End: 2025-07-05 | Stop reason: HOSPADM

## 2025-07-02 RX ORDER — SODIUM BICARBONATE 1 MEQ/ML
50 SYRINGE (ML) INTRAVENOUS ONCE
Status: COMPLETED | OUTPATIENT
Start: 2025-07-02 | End: 2025-07-02

## 2025-07-02 RX ORDER — PREDNISONE 5 MG/1
5 TABLET ORAL DAILY
Status: DISCONTINUED | OUTPATIENT
Start: 2025-07-02 | End: 2025-07-05 | Stop reason: HOSPADM

## 2025-07-02 RX ORDER — ISOSORBIDE MONONITRATE 30 MG/1
30 TABLET, EXTENDED RELEASE ORAL DAILY
Status: DISCONTINUED | OUTPATIENT
Start: 2025-07-02 | End: 2025-07-05 | Stop reason: HOSPADM

## 2025-07-02 RX ORDER — LIDOCAINE 560 MG/1
1 PATCH PERCUTANEOUS; TOPICAL; TRANSDERMAL DAILY
Status: DISCONTINUED | OUTPATIENT
Start: 2025-07-02 | End: 2025-07-02

## 2025-07-02 RX ORDER — LIDOCAINE 560 MG/1
2 PATCH PERCUTANEOUS; TOPICAL; TRANSDERMAL DAILY
Status: DISCONTINUED | OUTPATIENT
Start: 2025-07-02 | End: 2025-07-05 | Stop reason: HOSPADM

## 2025-07-02 RX ORDER — ACETAMINOPHEN 325 MG/1
650 TABLET ORAL EVERY 4 HOURS PRN
Status: DISCONTINUED | OUTPATIENT
Start: 2025-07-02 | End: 2025-07-04

## 2025-07-02 RX ORDER — DEXTROSE 50 % IN WATER (D50W) INTRAVENOUS SYRINGE
25
Status: DISCONTINUED | OUTPATIENT
Start: 2025-07-02 | End: 2025-07-05 | Stop reason: HOSPADM

## 2025-07-02 RX ORDER — HYDROCODONE BITARTRATE AND ACETAMINOPHEN 5; 325 MG/1; MG/1
1 TABLET ORAL 2 TIMES DAILY PRN
Refills: 0 | Status: DISCONTINUED | OUTPATIENT
Start: 2025-07-02 | End: 2025-07-03

## 2025-07-02 RX ORDER — POLYETHYLENE GLYCOL 3350 17 G/17G
17 POWDER, FOR SOLUTION ORAL DAILY PRN
Status: DISCONTINUED | OUTPATIENT
Start: 2025-07-02 | End: 2025-07-05 | Stop reason: HOSPADM

## 2025-07-02 RX ORDER — LIDOCAINE 560 MG/1
PATCH PERCUTANEOUS; TOPICAL; TRANSDERMAL
Status: CANCELLED | OUTPATIENT
Start: 2025-07-02

## 2025-07-02 RX ORDER — HEPARIN SODIUM 5000 [USP'U]/ML
5000 INJECTION, SOLUTION INTRAVENOUS; SUBCUTANEOUS EVERY 8 HOURS
Status: DISCONTINUED | OUTPATIENT
Start: 2025-07-02 | End: 2025-07-05 | Stop reason: HOSPADM

## 2025-07-02 RX ORDER — DEXTROSE MONOHYDRATE 100 MG/ML
50 INJECTION, SOLUTION INTRAVENOUS CONTINUOUS
Status: DISPENSED | OUTPATIENT
Start: 2025-07-02 | End: 2025-07-02

## 2025-07-02 RX ORDER — METOCLOPRAMIDE 10 MG/1
5 TABLET ORAL
Status: CANCELLED | OUTPATIENT
Start: 2025-07-03

## 2025-07-02 RX ORDER — PANTOPRAZOLE SODIUM 40 MG/1
40 TABLET, DELAYED RELEASE ORAL
Status: DISCONTINUED | OUTPATIENT
Start: 2025-07-03 | End: 2025-07-05 | Stop reason: HOSPADM

## 2025-07-02 RX ORDER — INSULIN LISPRO 100 [IU]/ML
0-10 INJECTION, SOLUTION INTRAVENOUS; SUBCUTANEOUS
Status: DISCONTINUED | OUTPATIENT
Start: 2025-07-02 | End: 2025-07-05 | Stop reason: HOSPADM

## 2025-07-02 RX ORDER — PREGABALIN 25 MG/1
25 CAPSULE ORAL NIGHTLY
Status: DISCONTINUED | OUTPATIENT
Start: 2025-07-02 | End: 2025-07-05 | Stop reason: HOSPADM

## 2025-07-02 RX ORDER — VANCOMYCIN HYDROCHLORIDE 1 G/20ML
INJECTION, POWDER, LYOPHILIZED, FOR SOLUTION INTRAVENOUS DAILY PRN
Status: DISCONTINUED | OUTPATIENT
Start: 2025-07-02 | End: 2025-07-04

## 2025-07-02 RX ORDER — TACROLIMUS 0.5 MG/1
0.5 CAPSULE ORAL DAILY
Status: CANCELLED | OUTPATIENT
Start: 2025-07-02

## 2025-07-02 RX ORDER — METOCLOPRAMIDE 5 MG/1
5 TABLET ORAL
Status: DISCONTINUED | OUTPATIENT
Start: 2025-07-03 | End: 2025-07-05 | Stop reason: HOSPADM

## 2025-07-02 RX ORDER — CYCLOBENZAPRINE HCL 10 MG
5 TABLET ORAL 2 TIMES DAILY PRN
Status: DISCONTINUED | OUTPATIENT
Start: 2025-07-02 | End: 2025-07-05 | Stop reason: HOSPADM

## 2025-07-02 RX ORDER — CALCIUM GLUCONATE 20 MG/ML
2 INJECTION, SOLUTION INTRAVENOUS ONCE
Status: COMPLETED | OUTPATIENT
Start: 2025-07-02 | End: 2025-07-02

## 2025-07-02 RX ORDER — NIFEDIPINE 90 MG/1
90 TABLET, EXTENDED RELEASE ORAL 2 TIMES DAILY
Status: DISCONTINUED | OUTPATIENT
Start: 2025-07-02 | End: 2025-07-05 | Stop reason: HOSPADM

## 2025-07-02 RX ORDER — HYDROCODONE BITARTRATE AND ACETAMINOPHEN 5; 325 MG/1; MG/1
1 TABLET ORAL 2 TIMES DAILY PRN
Refills: 0 | Status: CANCELLED | OUTPATIENT
Start: 2025-07-02

## 2025-07-02 RX ORDER — NIFEDIPINE 30 MG/1
90 TABLET, FILM COATED, EXTENDED RELEASE ORAL 2 TIMES DAILY
Status: CANCELLED | OUTPATIENT
Start: 2025-07-02

## 2025-07-02 RX ORDER — PANTOPRAZOLE SODIUM 40 MG/1
40 TABLET, DELAYED RELEASE ORAL
Status: CANCELLED | OUTPATIENT
Start: 2025-07-03

## 2025-07-02 RX ORDER — POLYETHYLENE GLYCOL 3350 17 G/17G
17 POWDER, FOR SOLUTION ORAL DAILY
Status: CANCELLED | OUTPATIENT
Start: 2025-07-02

## 2025-07-02 RX ORDER — PRAVASTATIN SODIUM 20 MG/1
10 TABLET ORAL NIGHTLY
Status: DISCONTINUED | OUTPATIENT
Start: 2025-07-02 | End: 2025-07-05 | Stop reason: HOSPADM

## 2025-07-02 RX ORDER — VANCOMYCIN 2 GRAM/500 ML IN 0.9 % SODIUM CHLORIDE INTRAVENOUS
2 ONCE
Status: COMPLETED | OUTPATIENT
Start: 2025-07-02 | End: 2025-07-02

## 2025-07-02 RX ORDER — ACETAMINOPHEN 325 MG/1
975 TABLET ORAL ONCE
Status: COMPLETED | OUTPATIENT
Start: 2025-07-02 | End: 2025-07-02

## 2025-07-02 RX ORDER — PREDNISONE 5 MG/1
5 TABLET ORAL DAILY
Status: CANCELLED | OUTPATIENT
Start: 2025-07-02

## 2025-07-02 RX ADMIN — INSULIN LISPRO 2 UNITS: 100 INJECTION, SOLUTION INTRAVENOUS; SUBCUTANEOUS at 21:38

## 2025-07-02 RX ADMIN — HYDROMORPHONE HYDROCHLORIDE 0.5 MG: 1 INJECTION, SOLUTION INTRAMUSCULAR; INTRAVENOUS; SUBCUTANEOUS at 12:38

## 2025-07-02 RX ADMIN — DEXTROSE MONOHYDRATE 25 G: 25 INJECTION, SOLUTION INTRAVENOUS at 12:38

## 2025-07-02 RX ADMIN — Medication 2 G: at 13:13

## 2025-07-02 RX ADMIN — CARVEDILOL 37.5 MG: 6.25 TABLET, FILM COATED ORAL at 21:33

## 2025-07-02 RX ADMIN — ACETAMINOPHEN 650 MG: 325 TABLET ORAL at 19:29

## 2025-07-02 RX ADMIN — PIPERACILLIN SODIUM AND TAZOBACTAM SODIUM 2.25 G: 2; .25 INJECTION, SOLUTION INTRAVENOUS at 21:52

## 2025-07-02 RX ADMIN — NIFEDIPINE 90 MG: 90 TABLET, FILM COATED, EXTENDED RELEASE ORAL at 21:35

## 2025-07-02 RX ADMIN — INSULIN HUMAN 5 UNITS: 100 INJECTION, SOLUTION PARENTERAL at 12:39

## 2025-07-02 RX ADMIN — SODIUM CHLORIDE, SODIUM LACTATE, POTASSIUM CHLORIDE, AND CALCIUM CHLORIDE 500 ML: .6; .31; .03; .02 INJECTION, SOLUTION INTRAVENOUS at 13:14

## 2025-07-02 RX ADMIN — SODIUM ZIRCONIUM CYCLOSILICATE 10 G: 10 POWDER, FOR SUSPENSION ORAL at 21:35

## 2025-07-02 RX ADMIN — SODIUM BICARBONATE 50 MEQ: 84 INJECTION INTRAVENOUS at 12:38

## 2025-07-02 RX ADMIN — CALCIUM GLUCONATE 2 G: 20 INJECTION, SOLUTION INTRAVENOUS at 13:14

## 2025-07-02 RX ADMIN — PIPERACILLIN SODIUM AND TAZOBACTAM SODIUM 4.5 G: 4; .5 INJECTION, SOLUTION INTRAVENOUS at 12:38

## 2025-07-02 RX ADMIN — HEPARIN SODIUM 5000 UNITS: 5000 INJECTION, SOLUTION INTRAVENOUS; SUBCUTANEOUS at 19:29

## 2025-07-02 RX ADMIN — PRAVASTATIN SODIUM 10 MG: 20 TABLET ORAL at 21:35

## 2025-07-02 RX ADMIN — LIDOCAINE 4% 2 PATCH: 40 PATCH TOPICAL at 21:38

## 2025-07-02 RX ADMIN — ASCORBIC ACID, THIAMINE MONONITRATE,RIBOFLAVIN, NIACINAMIDE, PYRIDOXINE HYDROCHLORIDE, FOLIC ACID, CYANOCOBALAMIN, BIOTIN, CALCIUM PANTOTHENATE, 1 CAPSULE: 100; 1.5; 1.7; 20; 10; 1; 6000; 150000; 5 CAPSULE, LIQUID FILLED ORAL at 21:33

## 2025-07-02 RX ADMIN — DEXTROSE 50 ML/HR: 10 SOLUTION INTRAVENOUS at 13:14

## 2025-07-02 RX ADMIN — ONDANSETRON 4 MG: 2 INJECTION INTRAMUSCULAR; INTRAVENOUS at 12:38

## 2025-07-02 RX ADMIN — ALBUTEROL SULFATE 20 MG: 2.5 SOLUTION RESPIRATORY (INHALATION) at 12:38

## 2025-07-02 RX ADMIN — IOHEXOL 75 ML: 350 INJECTION, SOLUTION INTRAVENOUS at 15:58

## 2025-07-02 RX ADMIN — ACETAMINOPHEN 975 MG: 325 TABLET ORAL at 12:38

## 2025-07-02 RX ADMIN — PREDNISONE 5 MG: 5 TABLET ORAL at 21:35

## 2025-07-02 RX ADMIN — ISOSORBIDE MONONITRATE 30 MG: 30 TABLET, EXTENDED RELEASE ORAL at 21:35

## 2025-07-02 SDOH — ECONOMIC STABILITY: FOOD INSECURITY: WITHIN THE PAST 12 MONTHS, YOU WORRIED THAT YOUR FOOD WOULD RUN OUT BEFORE YOU GOT THE MONEY TO BUY MORE.: NEVER TRUE

## 2025-07-02 SDOH — SOCIAL STABILITY: SOCIAL INSECURITY: ABUSE: ADULT

## 2025-07-02 SDOH — ECONOMIC STABILITY: INCOME INSECURITY: IN THE PAST 12 MONTHS HAS THE ELECTRIC, GAS, OIL, OR WATER COMPANY THREATENED TO SHUT OFF SERVICES IN YOUR HOME?: NO

## 2025-07-02 SDOH — SOCIAL STABILITY: SOCIAL INSECURITY: WITHIN THE LAST YEAR, HAVE YOU BEEN AFRAID OF YOUR PARTNER OR EX-PARTNER?: NO

## 2025-07-02 SDOH — SOCIAL STABILITY: SOCIAL INSECURITY: ARE THERE ANY APPARENT SIGNS OF INJURIES/BEHAVIORS THAT COULD BE RELATED TO ABUSE/NEGLECT?: NO

## 2025-07-02 SDOH — SOCIAL STABILITY: SOCIAL INSECURITY: HAVE YOU HAD THOUGHTS OF HARMING ANYONE ELSE?: NO

## 2025-07-02 SDOH — SOCIAL STABILITY: SOCIAL INSECURITY: WITHIN THE LAST YEAR, HAVE YOU BEEN HUMILIATED OR EMOTIONALLY ABUSED IN OTHER WAYS BY YOUR PARTNER OR EX-PARTNER?: NO

## 2025-07-02 SDOH — SOCIAL STABILITY: SOCIAL INSECURITY: DO YOU FEEL ANYONE HAS EXPLOITED OR TAKEN ADVANTAGE OF YOU FINANCIALLY OR OF YOUR PERSONAL PROPERTY?: NO

## 2025-07-02 SDOH — ECONOMIC STABILITY: FOOD INSECURITY: WITHIN THE PAST 12 MONTHS, THE FOOD YOU BOUGHT JUST DIDN'T LAST AND YOU DIDN'T HAVE MONEY TO GET MORE.: NEVER TRUE

## 2025-07-02 SDOH — SOCIAL STABILITY: SOCIAL INSECURITY: DO YOU FEEL UNSAFE GOING BACK TO THE PLACE WHERE YOU ARE LIVING?: NO

## 2025-07-02 SDOH — SOCIAL STABILITY: SOCIAL INSECURITY: ARE YOU OR HAVE YOU BEEN THREATENED OR ABUSED PHYSICALLY, EMOTIONALLY, OR SEXUALLY BY ANYONE?: NO

## 2025-07-02 SDOH — SOCIAL STABILITY: SOCIAL INSECURITY: DOES ANYONE TRY TO KEEP YOU FROM HAVING/CONTACTING OTHER FRIENDS OR DOING THINGS OUTSIDE YOUR HOME?: NO

## 2025-07-02 SDOH — SOCIAL STABILITY: SOCIAL INSECURITY: HAS ANYONE EVER THREATENED TO HURT YOUR FAMILY OR YOUR PETS?: NO

## 2025-07-02 SDOH — SOCIAL STABILITY: SOCIAL INSECURITY: WERE YOU ABLE TO COMPLETE ALL THE BEHAVIORAL HEALTH SCREENINGS?: YES

## 2025-07-02 ASSESSMENT — COGNITIVE AND FUNCTIONAL STATUS - GENERAL
PATIENT BASELINE BEDBOUND: NO
DRESSING REGULAR LOWER BODY CLOTHING: A LOT
MOBILITY SCORE: 12
CLIMB 3 TO 5 STEPS WITH RAILING: A LOT
TURNING FROM BACK TO SIDE WHILE IN FLAT BAD: A LOT
EATING MEALS: A LOT
MOVING TO AND FROM BED TO CHAIR: A LOT
STANDING UP FROM CHAIR USING ARMS: A LOT
DAILY ACTIVITIY SCORE: 12
DRESSING REGULAR UPPER BODY CLOTHING: A LOT
TOILETING: A LOT
MOVING FROM LYING ON BACK TO SITTING ON SIDE OF FLAT BED WITH BEDRAILS: A LOT
PERSONAL GROOMING: A LOT
WALKING IN HOSPITAL ROOM: A LOT
HELP NEEDED FOR BATHING: A LOT

## 2025-07-02 ASSESSMENT — ENCOUNTER SYMPTOMS
HEADACHES: 1
COLOR CHANGE: 1
FLANK PAIN: 0
DIFFICULTY URINATING: 1
WEAKNESS: 1
ABDOMINAL DISTENTION: 1
SHORTNESS OF BREATH: 1
CHILLS: 1
ARTHRALGIAS: 1
FATIGUE: 1
EYE PAIN: 0
ACTIVITY CHANGE: 1
CONFUSION: 1
BRUISES/BLEEDS EASILY: 0
APPETITE CHANGE: 1
POLYDIPSIA: 0
VOMITING: 1
UNEXPECTED WEIGHT CHANGE: 0
TROUBLE SWALLOWING: 0
EYE DISCHARGE: 0
NERVOUS/ANXIOUS: 1
DECREASED CONCENTRATION: 1
NAUSEA: 1
DIAPHORESIS: 0
HEMATURIA: 0
ABDOMINAL PAIN: 1
FEVER: 1
CHEST TIGHTNESS: 0
EYE ITCHING: 0
VOICE CHANGE: 1
SLEEP DISTURBANCE: 1
POLYPHAGIA: 0
DYSURIA: 1
APNEA: 0

## 2025-07-02 ASSESSMENT — ACTIVITIES OF DAILY LIVING (ADL)
JUDGMENT_ADEQUATE_SAFELY_COMPLETE_DAILY_ACTIVITIES: YES
DRESSING YOURSELF: INDEPENDENT
PATIENT'S MEMORY ADEQUATE TO SAFELY COMPLETE DAILY ACTIVITIES?: YES
WALKS IN HOME: INDEPENDENT
LACK_OF_TRANSPORTATION: NO
ADEQUATE_TO_COMPLETE_ADL: YES
BATHING: INDEPENDENT
HEARING - RIGHT EAR: FUNCTIONAL
HEARING - LEFT EAR: FUNCTIONAL
TOILETING: INDEPENDENT
GROOMING: INDEPENDENT
FEEDING YOURSELF: INDEPENDENT

## 2025-07-02 ASSESSMENT — PAIN - FUNCTIONAL ASSESSMENT: PAIN_FUNCTIONAL_ASSESSMENT: 0-10

## 2025-07-02 ASSESSMENT — LIFESTYLE VARIABLES
HOW OFTEN DO YOU HAVE A DRINK CONTAINING ALCOHOL: NEVER
AUDIT-C TOTAL SCORE: 0
HOW MANY STANDARD DRINKS CONTAINING ALCOHOL DO YOU HAVE ON A TYPICAL DAY: PATIENT DOES NOT DRINK
AUDIT-C TOTAL SCORE: 0
SKIP TO QUESTIONS 9-10: 1
HOW OFTEN DO YOU HAVE 6 OR MORE DRINKS ON ONE OCCASION: NEVER

## 2025-07-02 ASSESSMENT — PAIN SCALES - GENERAL
PAINLEVEL_OUTOF10: 3
PAINLEVEL_OUTOF10: 0 - NO PAIN

## 2025-07-02 ASSESSMENT — PAIN DESCRIPTION - PROGRESSION: CLINICAL_PROGRESSION: NOT CHANGED

## 2025-07-02 NOTE — PROGRESS NOTES
Patient has been identified as having an emergent need for administration of iodinated contrast for CT scan prior to result of laboratory studies OR despite known elevated GFR due to possibility of life and/or limb threatening pathology.    I acknowledge the risks and benefits of emergently proceeding with contrast administration including that, at present, it is the position of the [employer] that contrast induced nephropathy (ALFA) is a rare but possible consequence. At this time the benefits of proceeding with contrast administration outweigh the risks.    The patient is on dialysis    Raven Calix DO  Emergency Medicine  Medical Toxicology

## 2025-07-02 NOTE — PROGRESS NOTES
I received Manolo Bashir in signout from outgoing provider.  Please see the previous note for all HPI, PE and MDM up to the time of signout at 1500.    In brief Manolo Bashir is an 69 y.o. male presenting for chest pain and shortness of breath  Chief Complaint   Patient presents with    Chest Pain   .  At the time of signout we were awaiting: Results of CT imaging    I examined the patient at time of assumption of care:  Physical Exam  Vitals and nursing note reviewed.   Constitutional:       Appearance: He is not ill-appearing.   HENT:      Head: Normocephalic and atraumatic.   Cardiovascular:      Rate and Rhythm: Normal rate and regular rhythm.   Pulmonary:      Breath sounds: Examination of the right-lower field reveals decreased breath sounds. Examination of the left-lower field reveals decreased breath sounds. Decreased breath sounds present.   Abdominal:      Palpations: Abdomen is soft.      Tenderness: There is no abdominal tenderness.   Musculoskeletal:      Cervical back: Normal range of motion and neck supple.      Right lower leg: No edema.      Left lower leg: No edema.   Skin:     General: Skin is warm and dry.      Capillary Refill: Capillary refill takes less than 2 seconds.   Neurological:      Comments: Sleeping but easily awoken, no obvious cranial nerve deficit, the patient moves all 4 extremities without deficit or difficulty              During my care patient reassessed as above, lab work pertinent for hyperkalemia now status post treatment.  CT showed bilateral pneumonia with right greater than left, right sided parapneumonic effusion.  Patient was given broad-spectrum antibiotics by prior shift.  Patient does require 6 L nasal cannula of oxygen.  Patient admitted for further management..      Patient seen and discussed with attending of record.    Ceasar Francisco MD

## 2025-07-02 NOTE — SIGNIFICANT EVENT
Rapid Response Nurse Note: MIGUEL score of 8    Pager time:   Arrival time:   Event end time: 18:49  Location: [facility] 60  [x] Triage by phone     Rapid response initiated by:  [] Rapid response RN [] Family [] Nursing Supervisor [] Physician   [x] RADAR auto page [] Sepsis auto-page [] RN [] RT   [] NP/PA [] Other:     Primary reason for call:   [] BAT [] New CPAP/BiPAP [] Bleeding [] Change in mental status   [] Chest pain [] Code blue [] FiO2 >/= 50% [] HR </= 40 bpm   [] HR >/= 130 bpm [] Hyperglycemia [] Hypoglycemia [x] RADAR    [] RR </= 8 bpm [] RR >/= 30 bpm [] SBP </= 90 mmHg [] SpO2 < 90%   [] Seizure [] Sepsis [] Shortness of breath  [] Staff concern: see comments     Interventions:  [x] None [] ABG/VBG [] Assist w/ICU transfer [] BAT paged    [] Bag mask [] Blood [] Cardioversion [] Code Blue   [] Code blue for intubation [] Code status changed [] Chest x-ray [] EKG   [] IV fluid/bolus [] KUB x-ray [] Labs/cultures [] Medication   [] Nebulizer treatment [] NIPPV (CPAP/BiPAP) [] Oxygen [] Oral airway   [] Peripheral IV [] Palliative care consult [] CT/MRI [] Sepsis protocol    [] Suctioned [] Other:     Outcome:  [] Coded and  [] Code blue for intubation [] Coded and transferred to ICU []  on division   [x] Remained on division (no change) [] Remained on division + additional monitoring [] Remained in ED [] Transferred to ED   [] Transferred to ICU [] Transferred to inpatient status [] Transferred for interventions (procedure) [] Transferred to ICU stepdown    [] Transferred to surgery [] Transferred to telemetry [] Sepsis protocol [] STEMI protocol   [] Stroke protocol       Additional Comments:      Notified nurse of MIGUEL soria received: [phone].  Patient arrived from ER per nurse; no concerns or assistance needed at this time; encouraged to call a rapid response as needed if patient status changes.

## 2025-07-02 NOTE — SIGNIFICANT EVENT
"SENIOR STAFFING NOTE    HPI  68yo male with HTN, T2DM, vascular disease, ESRD on TTS HD (s/p 2x transplant, nonfunctioning), MGUS, prostate ca (s/p prostatectomy), treated HCV presented to the ER this AM with chest discomfort, nausea, vomiting and cough.  Cough symptoms began yesterday. This morning he vomited and was more tired than normal, not acting like himself so friend/caregiver Amalia brought him to the ER. Friend/caregiver checked BG which was >150, did not notice that he had a fever. He is oliguric, no abdominal pain, no known sick contacts.     In the ED/Upon arrival to the floor/unit:  - Vitals:   Febrile to 39.1, HTN with 204/114, improved to 157/113 on recheck  - Labs:   Hypochloremic (94) hyponatremia (130)   K 5.7 given NaHCO3, insulin (and dextrose), Ca gluconate, albuterol   HFP wnl   BCx x2 drawn   Troponin neg x2 48 -> 50  - Imaging:   CT: Bilateral pleural effusions with dependent pulmonary edema/atelectasis and worsening anasarca with cardiomegaly concerning for HF exacerbation. Worsening portal HTN and hepatosplenomegaly.     In the ED  Gave dilaudid, tylenol, zofran for pain/nausea  500 ml LR   Vanc/Zosyn  Treated hyperkalemia as above  New O2 requirement of 6L    History:  Prior recent hospitalizations (most recently early June for coracoid process fracture with rotator cuff tear).   - Echocardiography:   TTE Jan 2025: LVEF 55%, grade III (restrictive) LV diastolic filling with elevated LA pressure  BNP April 2025: 0829    OBJECTIVE:  /69   Pulse 85   Temp 36.8 °C (98.2 °F) (Temporal)   Resp 20   Ht 1.727 m (5' 8\")   Wt 68 kg (150 lb)   SpO2 94%   BMI 22.81 kg/m²     Constitutional: Tired appearing, tachypneic, arouses to voice and then nods off, warm to touch.   HEENT: sclerae anicteric  CV: RRR, grade 3/6 systolic murmur at LUSB  Pulm: decreased respiratory effort, difficult to appreciate lung sounds  GI: abd soft, post-surgical scars noted  Skin/Ext: trace lower extremity " edema, L>R, skin warm and dry  Neuro: tired appearing, bilateral upper and lower extremity weakness, limited ability to follow commands. Tremors of R leg and face.     Assessment & Plan  70yo male with HTN, T2DM, vascular disease, ESRD on TTS HD (s/p 2x transplant, nonfunctioning), MGUS, prostate ca (s/p prostatectomy), treated HCV admitted for pneumonia (bilateral) vs heart failure exacerbation.   Imaging findings of b/l pleural effusions is more consistent with volume overload, however with fever and AMS concern for infection in addition to volume overload state. Immunosuppression (on tacro and prednisone) increases risk for infection. Current source of infection is PNA; unable to obtain UA given anuric/oliguric ESRD, blood culture pending, COVID, flu, RSV negative, no history of diarrhea concerning for gastroenteritis, no evidence of intraabdominal infection on CT a/p. New O2 requirement of 6L with tachypnea.  patient was febrile and is immunosuppressed with new O2 requirement will continue treatment for PNA.     #Fever, cough, hypoxia  ::Possible pneumonia as source of fevers  ::6L NC, intermittently tachypneic to 30s  -Bcx x2 pending  -Continue vanc/zosyn  -PNA workup ordered and pending (MRSA nares, procal)  -Lactate wnl, s/p 500 ml bolus, bolus with caution given fluid overload    #bilateral pleural effusions  ::Prior CT in early June with small b/l pleural effusions, today's effusions significantly larger  -Order BNP (previously [id number])  -Initiate HD for fluid removal    #ESRD on TTS HD  #Hyperkalemia s/p hyperK cocktail in ER, K 5.7  ::Last HD 7/1, noted per caregiver to have had hypotension during session  -Contacted transplant nephrology, HOLD tacrolimus   -Tacrolimus level in AM   -Discussed with nephrology team, will likely have HD in the morning  -1x dose lokelma tonight, repeat RFP tonight     #HTN  #T2DM  -Lantus 10 units (home dose 20 units) + lispro SSI  -Continue home anti-hypertensives,  monitor BP and hold as needed I/s/o possible infection     N: Regular diet; consider renal   A: pIV, fistula for HD  DVT ppx: subQ heparin  GI ppx: cont home pantoprazole    Code Status: Full Code (confirmed on admission)  Surrogate Medical Decision-maker: Daughter, Purnima [phone]  Friend Amalia Enriquez [phone]    Patient was examined with the intern and plan was reviewed and discussed with them. Please see their note for further details.    Nicol Grant, PGY3  Internal Medicine/Pediatrics

## 2025-07-02 NOTE — ED PROVIDER NOTES
Emergency Department Provider Note        History of Present Illness     69-year-old male with history of ESRD on HD T/TH/SA, failed kidney transplant x 2 (2006, 2013) chronic shoulder pain, HTN DM2, remote HCV, prostate CA SP radical proctectomy presenting with his friend complaining of chest pain with nausea vomiting.  Symptoms began this morning.  Had a couple episodes of NBNB vomiting.  States he did receive a full dialysis yesterday.  Reportedly his pressure after dialysis was low, checked at this morning and it was high and then took his BP medications.  Patient does not verbalize much, seemingly from his discomfort as it appears he is able to, therefore history and physical somewhat limited.  Patient denies any subjective fever.  Denies change in bowel habits.  Denies headache.      Medical History[1]  Surgical History[2]  Social History[3]  Allergies[4]      External Records Reviewed including ED notes, H&P, Discharge Summary, outpatient PCP/specialist notes.  Physical Exam       Triage Vitals: T 36.8 °C (98.2 °F)  HR 95  BP (!) 204/114  RR 18  O2 95 % Supplemental oxygen (7L)  GEN: NAD, ill-appearing  EYES:  EOMs grossly intact, anicteric sclera  BERTHA: Mucosa appears dry.  NECK: Supple.  CARD: RRR, chest with mild diffuse tenderness  PULMONARY: Moving air well. Clear all lung fields.  ABDOMEN: Soft, no guarding, no rigidity.  Diffusely tender poorly localized. NABS  EXTREMITIES: Full ROM, 1+ lower extremity bilateral pitting edema,   SKIN: Intact, warm and dry  NEURO: Alert and oriented x 3, speech is clear, no obvious deficits noted.         Medical Decision Making & ED Course     69-year-old male presenting for chest pain with nausea vomiting.  On exam he is ill-appearing however sitting up without difficulty.  Febrile at 39.1, initially 204/114 however repeat improved at 157/113, 93% on room air, no tachycardia.  Diffusely tender abdomen and chest poorly localized, mixed volume status as his oral  mucosa appears very dry however he has 1+ lower extremity bilateral pitting edema.  Will check laboratory work, provide Zofran Dilaudid 500 cc bolus, Tylenol and empiric antibiotics.  Will perform CT chest abdomen pelvis.    ED Course as of 07/03/25 0724   Wed Jul 02, 2025   1133 EKG reviewed and interpreted independently by me: NSR at 98 bpm, LAD; wide qrs at 140; aramis in V2-4; V5-6 std; IV block; worsening st segment changes compared to ekg on 6/3/2025; new ekg at 1123 showed slight increase in std in avf and increase aramis in avl but artifact noted in baseline [LP]   1208 Blood gas noted K of 6.3; lactate wnl. Due to EKG changes will order  hyperk cocktail; will hold on GI agent until CT of a/p returns [LP]   1438 On reeval patient is resting more comfortably. Did desaturate and was placed on NCO2. BP improved without intervention.CT pending for admission [LP]   1625 On my interpretation the patient's CT scan shows large right sided pneumonia with parapneumonic effusion and smaller left-sided consolidation [GA]      ED Course User Index  [GA] Ceasar Francisco MD  [LP] Raven Calix, DO         Diagnoses as of 07/03/25 0724   Pneumonia of both lower lobes due to infectious organism     CT chest abdomen pelvis w IV contrast   Final Result   1.  Worsening volume overload likely related to heart failure given   worsening bilateral pleural effusions and dependent pulmonary   edema/atelectasis and worsening anasarca. Recommend correlation with   patient's fluid status an echocardiogram given the presence of   moderate to severe cardiomegaly.   2. Worsening portal hypertension with enlarging portal/splenic veins   and worsening hepatosplenomegaly as described above. Findings may be   related to right-sided heart failure.   3. Large right shoulder fluid/soft tissue collection with associated   cortical scalloping of adjacent osseous structures including the   right anterior humerus, distal clavicle, and superior scapula    compatible with subacute/chronic inflammatory process. This is better   characterized on prior MR and remains worrisome for synovitis versus   crystal arthropathy.   4. Dilated main pulmonary artery, which can be seen with pulmonary   hypertension.   5. Remaining chronic and nonacute findings are described above.             I have reviewed the images/study and I agree with the findings as   stated by Ian Dash MD (PGY-3).        MACRO:   None        Signed by: Jj Cai 7/2/2025 4:45 PM   Dictation workstation:   [id number]      XR chest 1 view   Final Result   1.  Bibasilar infiltrates.   2.  Mild vascular congestion.   Signed by Amanuel Welsh MD        Labs Reviewed   CBC WITH AUTO DIFFERENTIAL - Abnormal       Result Value    WBC 4.7      nRBC 0.0      RBC 3.81 (*)     Hemoglobin 10.6 (*)     Hematocrit 32.1 (*)     MCV 84      MCH 27.8      MCHC 33.0      RDW 15.4 (*)     Platelets 165      Neutrophils % 73.9      Immature Granulocytes %, Automated 0.6      Lymphocytes % 16.2      Monocytes % 6.6      Eosinophils % 2.3      Basophils % 0.4      Neutrophils Absolute 3.46      Immature Granulocytes Absolute, Automated 0.03      Lymphocytes Absolute 0.76 (*)     Monocytes Absolute 0.31      Eosinophils Absolute 0.11      Basophils Absolute 0.02     COMPREHENSIVE METABOLIC PANEL - Abnormal    Glucose 188 (*)     Sodium 130 (*)     Potassium 5.7 (*)     Chloride 94 (*)     Bicarbonate 27      Anion Gap 15      Urea Nitrogen 18      Creatinine 6.77 (*)     eGFR 8 (*)     Calcium 10.5      Albumin 3.7      Alkaline Phosphatase 93      Total Protein 8.3 (*)     AST 25      Bilirubin, Total 0.6      ALT 13     CBC - Abnormal    WBC 4.3 (*)     nRBC 0.0      RBC 3.11 (*)     Hemoglobin 8.8 (*)     Hematocrit 28.5 (*)     MCV 92      MCH 28.3      MCHC 30.9 (*)     RDW 15.5 (*)     Platelets 121 (*)    RENAL FUNCTION PANEL - Abnormal    Glucose 207 (*)     Sodium 130 (*)     Potassium 5.7 (*)     Chloride 95  (*)     Bicarbonate 30      Anion Gap 11      Urea Nitrogen 20      Creatinine 8.01 (*)     eGFR 7 (*)     Calcium 9.2      Phosphorus 3.7      Albumin 2.6 (*)    B-TYPE NATRIURETIC PEPTIDE - Abnormal    BNP 1,720 (*)     Narrative:        <100 pg/mL - Heart failure unlikely  100-299 pg/mL - Intermediate probability of acute heart                  failure exacerbation. Correlate with clinical                  context and patient history.    >=300 pg/mL - Heart Failure likely. Correlate with clinical                  context and patient history.     Biotin interference may cause falsely decreased results. Patients taking a Biotin dose of up to 5 mg/day should refrain from taking Biotin for 24 hours before sample  collection. Providers may contact their local laboratory for further information.   POCT GLUCOSE - Abnormal    POCT Glucose 112 (*)    POCT GLUCOSE - Abnormal    POCT Glucose 188 (*)    BLOOD CULTURE - Normal    Blood Culture Loaded on Instrument - Culture in progress     BLOOD CULTURE - Normal    Blood Culture Loaded on Instrument - Culture in progress     LACTATE - Normal    Lactate 1.8      Narrative:     Venipuncture immediately after or during the administration of Metamizole may lead to falsely low results. Testing should be performed immediately prior to Metamizole dosing.   LIPASE - Normal    Lipase 10      Narrative:     Venipuncture immediately after or during the administration of Metamizole may lead to falsely low results. Testing should be performed immediately prior to Metamizole dosing.   PHOSPHORUS - Normal    Phosphorus 3.8     MAGNESIUM - Normal    Magnesium 1.78     PROTIME-INR - Normal    Protime 11.9      INR 1.1     SARS-COV-2 PCR - Normal    Coronavirus 2019, PCR Not Detected      Narrative:     This assay is an FDA-cleared, in vitro diagnostic nucleic acid amplification test for the qualitative detection and differentiation of SARS CoV-2 from nasopharyngeal specimens collected from  individuals with signs and symptoms of respiratory tract infections, and has been validated for use at [facility]. Negative results do not preclude COVID-19 infections and should not be used as the sole basis for diagnosis, treatment, or other management decisions. Testing for SARS CoV-2 is recommended only for patients who meet current clinical and/or epidemiological criteria defined by federal, state, or local public health directives.   SERIAL TROPONIN-INITIAL - Normal    Troponin I, High Sensitivity (CMC) 48      Narrative:     Less than 99th percentile of normal range cutoff-  Female and children under 18 years old <35 ng/L; Male <54 ng/L: Negative  Repeat testing should be performed if clinically indicated.     Female and children under 18 years old  ng/L; Male  ng/L:  Consistent with possible cardiac damage and possible increased clinical   risk. Serial measurements may help to assess extent of myocardial damage.     >120 ng/L: Consistent with cardiac damage, increased clinical risk and  myocardial infarction. Serial measurements may help assess extent of   myocardial damage.      NOTE: Children less than 1 year old may have higher baseline troponin   levels and results should be interpreted in conjunction with the overall   clinical context.    NOTE: Troponin I testing is performed using a different   testing methodology at [facility] than at [facility]. Direct result comparisons should only   be made within the same method.     INFLUENZA A AND B PCR - Normal    Flu A Result Not Detected      Flu B Result Not Detected      Narrative:     This assay is an in vitro diagnostic multiplex nucleic acid amplification test for the detection and discrimination of Influenza A & B from nasopharyngeal specimens, and has been validated for use at [facility]. Negative results do not preclude Influenza A/B infections, and should not be  used as the sole basis for diagnosis, treatment, or other management decisions. If Influenza A/B and RSV PCR results are negative, testing for Parainfluenza virus, Adenovirus and Metapneumovirus is routinely performed for [facility] pediatric oncology and intensive care inpatients, and is available on other patients by placing an add-on request.   SERIAL TROPONIN, 1 HOUR - Normal    Troponin I, High Sensitivity (CMC) 50      Narrative:     Less than 99th percentile of normal range cutoff-  Female and children under 18 years old <35 ng/L; Male <54 ng/L: Negative  Repeat testing should be performed if clinically indicated.     Female and children under 18 years old  ng/L; Male  ng/L:  Consistent with possible cardiac damage and possible increased clinical   risk. Serial measurements may help to assess extent of myocardial damage.     >120 ng/L: Consistent with cardiac damage, increased clinical risk and  myocardial infarction. Serial measurements may help assess extent of   myocardial damage.      NOTE: Children less than 1 year old may have higher baseline troponin   levels and results should be interpreted in conjunction with the overall   clinical context.    NOTE: Troponin I testing is performed using a different   testing methodology at [facility] than at other   [facility]. Direct result comparisons should only   be made within the same method.     STAPHYLOCOCCUS AUREUS/MRSA COLONIZATION, CULTURE   RESPIRATORY CULTURE/SMEAR   TROPONIN SERIES- (INITIAL, 1 HR)    Narrative:     The following orders were created for panel order Troponin I Series, High Sensitivity (0, 1 HR).  Procedure                               Abnormality         Status                     ---------                               -----------         ------                     Troponin I, High Sensiti...[[id number]]  Normal              Final result               Troponin, High Sensitivi...[[id number]]  Normal               Final result                 Please view results for these tests on the individual orders.   BLOOD GAS VENOUS FULL PANEL   COMPREHENSIVE METABOLIC PANEL   TACROLIMUS   PROCALCITONIN   POCT GLUCOSE METER   POCT GLUCOSE METER   POCT GLUCOSE METER   POCT GLUCOSE METER   POCT GLUCOSE METER       ----------------------------------------------------------------------------------------------------------------------------    This note was dictated using a speech recognition program.  While an attempt was made at proof reading to minimize errors, minor errors in transcription may be present call for questions.       [1]   Past Medical History:  Diagnosis Date    Anemia     Arthritis     Cataract     Chronic kidney disease, stage 3 unspecified (Multi) 09/26/2018    Stage 3 chronic kidney disease    CKD (chronic kidney disease)     stage V    Cough 02/12/2024    COVID-19 06/18/2020    COVID-19 virus infection    Diabetes (Multi)     ESRD (end stage renal disease) (Multi)     Focal and segmental proliferative glomerulonephritis 12/23/2023    HTN (hypertension)     Hyperlipidemia     Other long term (current) drug therapy 07/20/2021    High risk medication use    Personal history of other diseases of the circulatory system     Personal history of cardiac murmur    Personal history of other infectious and parasitic diseases 08/17/2015    History of hepatitis    Polyp, colonic 08/17/2023    Primary osteoarthritis of both ankles 08/17/2023    Prostate cancer (Multi)     Tubular adenoma of colon 08/17/2023    Unspecified kidney failure 08/17/2016    Renal failure   [2]   Past Surgical History:  Procedure Laterality Date    ILEOSTOMY  04/25/2017    Ileostomy    ILEOSTOMY CLOSURE  08/17/2015    Ileostomy Closure    OTHER SURGICAL HISTORY  04/21/2017    Right Hemicolectomy    OTHER SURGICAL HISTORY  08/17/2015    Arteriovenous Surgery Creation Of A-V Fistula    OTHER SURGICAL HISTORY  08/17/2015    Sigmoidoscopy (Fiberoptic,  Therapeutic )    PROSTATECTOMY  10/11/2013    Prostatectomy Radical    TRANSPLANT, KIDNEY, OPEN  1992    TRANSPLANT, KIDNEY, OPEN  2013    US GUIDED PERCUTANEOUS BIOPSY RENAL LEFT Left 11/20/2023    US GUIDED PERCUTANEOUS BIOPSY RENAL LEFT 11/20/2023 Lou Rodgers MD [facility]    US GUIDED PERCUTANEOUS PERITONEAL OR RETROPERITONEAL FLUID COLLECTION DRAINAGE  10/20/2022    US GUIDED PERCUTANEOUS PERITONEAL OR RETROPERITONEAL FLUID COLLECTION DRAINAGE 10/20/2022 [facility] CLINICAL LEGACY   [3]   Social History  Socioeconomic History    Marital status: Single   Tobacco Use    Smoking status: Never     Passive exposure: Past    Smokeless tobacco: Never   Vaping Use    Vaping status: Never Used   Substance and Sexual Activity    Alcohol use: Never    Sexual activity: Defer     Social Drivers of Health     Financial Resource Strain: Low Risk  (7/2/2025)    Overall Financial Resource Strain (CARDIA)     Difficulty of Paying Living Expenses: Not hard at all   Food Insecurity: No Food Insecurity (7/2/2025)    Hunger Vital Sign     Worried About Running Out of Food in the Last Year: Never true     Ran Out of Food in the Last Year: Never true   Transportation Needs: No Transportation Needs (7/2/2025)    PRAPARE - Transportation     Lack of Transportation (Medical): No     Lack of Transportation (Non-Medical): No   Physical Activity: Sufficiently Active (1/5/2025)    Exercise Vital Sign     Days of Exercise per Week: 7 days     Minutes of Exercise per Session: 60 min   Stress: No Stress Concern Present (3/1/2025)    [employer] - Occupational Stress Questionnaire     Feeling of Stress : Not at all   Social Connections: Socially Isolated (5/25/2025)    Social Connection and Isolation Panel [NHANES]     Frequency of Communication with Friends and Family: More than three times a week     Frequency of Social Gatherings with Friends and Family: More than three times a week     Attends Buddhist Services: Never      Active Member of Clubs or Organizations: No     Attends Club or Organization Meetings: Never     Marital Status: Never    Intimate Partner Violence: Not At Risk (7/2/2025)    Humiliation, Afraid, Rape, and Kick questionnaire     Fear of Current or Ex-Partner: No     Emotionally Abused: No     Physically Abused: No     Sexually Abused: No   Housing Stability: Low Risk  (7/2/2025)    Housing Stability Vital Sign     Unable to Pay for Housing in the Last Year: No     Number of Times Moved in the Last Year: 0     Homeless in the Last Year: No   [4] No Known Allergies       Osei Foote PA-C  07/03/25 0797

## 2025-07-02 NOTE — ED PROCEDURE NOTE
Procedure  Critical Care    Performed by: Raven Calix DO  Authorized by: Raven Calix DO    Critical care provider statement:     Critical care time (minutes):  40    Critical care time was exclusive of:  Separately billable procedures and treating other patients and teaching time    Critical care was necessary to treat or prevent imminent or life-threatening deterioration of the following conditions:  Respiratory failure and sepsis    Critical care was time spent personally by me on the following activities:  Blood draw for specimens, development of treatment plan with patient or surrogate, evaluation of patient's response to treatment, examination of patient, obtaining history from patient or surrogate, review of old charts, re-evaluation of patient's condition, pulse oximetry, ordering and review of radiographic studies, ordering and review of laboratory studies and ordering and performing treatments and interventions               Raven Calix DO  07/04/25 0940

## 2025-07-02 NOTE — H&P
History Of Present Illness  Manolo Bashir is a 69 y.o. male with past medical history of stage 3 CKD/ESRD on HD TTS with stage 3 CKD s/p failed bilateral kidney transplant now on HD TTS , HFpEF, Metabolic syndrome, Monoclonal antibody of undetermined significance (MGUS) gastroparesis presenting with chest discomfort and cough. Patient is admitted for c/f bilateral pneumonia complicated by right parapneumonic effusion.      Home Course:   History taken from his /friend Amalia because patient does not have capacity. Patient was brought to dialysis by  on the morning of 7/1 where patient had low blood pressures during the session. This prompted patient to not take his current anti-hypertensive medications. That evening patient stated he had a headache for which he took 2x tylenol and also a mild dry cough. Patient woke up feeling fine and having good appetite.  took his blood sugars which read 196 and give him his sliding scale insulin prior to breakfast. Patient ate without issues and  took his blood pressure which was remarkably in the 200's systolic prompting the  to give the patient his anti-hypertensive medications. Had a couple episodes of non-bloody, non-bilious vomiting after breakfast.     Patient does not verbalize much, seemingly from his discomfort as it appears he is able to, therefore history and physical somewhat limited.     Outside Hospital Course:   N/A    ED Course:   Patient was found to be febrile at 39.1 with unstable vital signs. Patient was given tylenol and antibiotics, which improved his fever. Patient is hypoxic requiring 6L NC. On physical exam they found bilateral decreased breath sounds, diffusely tender chest and sub-xiphoid chest pain. Volume exam at the time was positive for dry MM and 1+ LE pitting Patient.  Additionally he has hyponatremia (130), hypochloremic (94), hyperkalemia now corrected with NaHCO3 insulin Ca gluconate and albuterol,  and anemic (10.6, BL 8-10). Negative Trop for chest pain (48, 50).   CXR (7/2) shows bibasilar infiltrates, mild vascular congestion, left axilla vascular stent, calcific nodule in lateral left upper lobe. CTAP w/ contrast (benefits outweigh risks documented) shows cardiomegaly c/b volume overload due to worsening pleural effusions, dependent pulmonary edema, atelectasis, and anasarca. Worsening portal HTN with enlarging portal/splenic veins and hepatosplenomegaly. Dilated main pulmonary artery.     Vital Signs: °C 39.1/ -204/ / HR 85-98/ RR 16-27/ 84-97% on RA     Labs:   CBC: WBC 4.7, Hgb 10.6, plt 73.9   BMP: Na 130, K5.7 , Cl 94, HCO3 27, BUN 18, Cr 6.77, glu 188   LFT: Ca 10.5, tprot 8.3, alb 3.7, alkphos 93, AST 25, ALT 13, tbili 0.6   Mg 1.78, phos 3.7   Heme: PT 11.9, INR 1.1        Past Medical History  He has a past medical history of Anemia, Arthritis, Cataract, Chronic kidney disease, stage 3 unspecified (Multi) (09/26/2018), CKD (chronic kidney disease), Cough (02/12/2024), COVID-19 (06/18/2020), Diabetes (Multi), ESRD (end stage renal disease) (Multi), Focal and segmental proliferative glomerulonephritis (12/23/2023), HTN (hypertension), Hyperlipidemia, Other long term (current) drug therapy (07/20/2021), Personal history of other diseases of the circulatory system, Personal history of other infectious and parasitic diseases (08/17/2015), Polyp, colonic (08/17/2023), Primary osteoarthritis of both ankles (08/17/2023), Prostate cancer (Multi), Tubular adenoma of colon (08/17/2023), and Unspecified kidney failure (08/17/2016).    Surgical History  He has a past surgical history that includes Prostatectomy (10/11/2013); Ileostomy (04/25/2017); Other surgical history (04/21/2017); Ileostomy closure (08/17/2015); Other surgical history (08/17/2015); Other surgical history (08/17/2015); US guided percutaneous peritoneal or retroperitoneal fluid collection drainage (10/20/2022); transplant,  kidney, open (1992); transplant, kidney, open (2013); and US guided percutaneous biopsy renal left (Left, 11/20/2023).     Social History  He reports that he has never smoked. He has been exposed to tobacco smoke. He has never used smokeless tobacco.  Drug: Oxycodone. He reports that he does not drink alcohol.    Family History  Family History[1]    Outpatient Medications  Current Outpatient Medications   Medication Instructions    blood sugar diagnostic (True Metrix Glucose Test Strip) For BG check 4x/day    carvedilol (COREG) 37.5 mg, oral, 2 times daily, Hold for systolic BP <140 and HR <60. PATIENT NEEDS TO FOLLOW UP WITH CARDIOLOGY    clobetasol (Temovate) 0.05 % external solution Apply topically 2 times a day for 14 days. Use on scalp nightly if needed for itch    clotrimazole (Lotrimin) 1 % cream Topical, 2 times daily    cyclobenzaprine (FLEXERIL) 5 mg, oral, 2 times daily PRN    Easy Touch Alcohol Prep Pads pads, medicated     fluocinonide (Lidex) 0.05 % ointment Apply to affected areas twice daily when active as needed. Use less than 14 days per month.    Gvoke HypoPen 1-Pack 1 mg, intramuscular, Every 15 min PRN    HYDROcodone-acetaminophen (Norco) 5-325 mg tablet 1 tablet, oral, 2 times daily PRN    imiquimod (Aldara) 5 % cream Use daily for 6 weeks on penis, right on that white and bumpy area  Put a layer about 2 quarter size wide on that area, very thin layer, and cover with vaseline    insulin glargine (Lantus) 100 unit/mL (3 mL) pen 20 units daily    insulin lispro (HumaLOG) 100 unit/mL pen 4 units with lunch and dinner plus a sliding scale up to 20 units daily    isosorbide mononitrate ER (IMDUR) 30 mg, oral, Daily, Do not crush or chew.    lancets 33 gauge misc 1 each, miscellaneous, 3 times daily    lancing device misc use as directed    lidocaine 4 % patch Apply one patch topically for 12 hours on and 12 hours off.    metoclopramide (REGLAN) 5 mg, oral, 3 times daily before meals    naloxone  "(NARCAN) 4 mg, nasal, As needed, May repeat every 2-3 minutes if needed, alternating nostrils, until medical assistance becomes available.    NIFEdipine ER (ADALAT CC) 90 mg, oral, 2 times daily, Do not crush, chew, or split.    pantoprazole (ProtoNix) 40 mg EC tablet Take 1 tablet (40 mg) by mouth once daily in the morning. Take before meals. Do not crush, chew, or split. Do not start before January 22, 2024.    pen needle, diabetic (TechLITE Pen Needle) 32 gauge x 5/32\" needle Use 4 a day as directed    polyethylene glycol (Glycolax, Miralax) 17 gram/dose powder Mix 17 g of powder and drink once daily. Do not fill before June 8, 2025.    pravastatin (PRAVACHOL) 10 mg, oral, Nightly    predniSONE (DELTASONE) 5 mg, oral, Daily    pregabalin (LYRICA) 25 mg, oral, Nightly    sevelamer carbonate (RENVELA) 800 mg, oral, 3 times daily before meals, Swallow tablet whole; do not crush, break, or chew.    sodium chloride (Ocean) 0.65 % nasal spray 1 spray, Each Nostril, 4 times daily PRN    syringe with needle 3 mL 25 x 5/8\" syringe Use to inject epogen.    tacrolimus (PROGRAF) 0.5 mg, oral, Daily    tacrolimus (Protopic) 0.1 % ointment Topical, 2 times daily    vitamin B complex-vitamin C-folic acid (Nephrocaps) 1 mg capsule 1 capsule, oral, Daily        Allergies  Patient has no known allergies.    Review of Systems   Constitutional:  Positive for activity change, appetite change, chills, fatigue and fever. Negative for diaphoresis and unexpected weight change.   HENT:  Positive for drooling and voice change. Negative for trouble swallowing.    Eyes:  Negative for pain, discharge and itching.   Respiratory:  Positive for shortness of breath. Negative for apnea and chest tightness.    Cardiovascular:  Positive for chest pain and leg swelling.   Gastrointestinal:  Positive for abdominal distention, abdominal pain, nausea and vomiting.   Endocrine: Negative for polydipsia, polyphagia and polyuria.   Genitourinary:  Positive " for decreased urine volume, difficulty urinating and dysuria. Negative for flank pain, hematuria, penile discharge and urgency.   Musculoskeletal:  Positive for arthralgias.   Skin:  Positive for color change.   Allergic/Immunologic: Positive for immunocompromised state (On tacrolimus and prednisone).   Neurological:  Positive for weakness and headaches.   Hematological:  Does not bruise/bleed easily.   Psychiatric/Behavioral:  Positive for confusion, decreased concentration and sleep disturbance. The patient is nervous/anxious.        Objectives        Last Recorded Vitals  Temp:  [36.8 °C (98.2 °F)-39.1 °C (102.3 °F)] 37.1 °C (98.8 °F)  Heart Rate:  [] 85  Resp:  [15-33] 15  BP: (139-204)/() 177/73  SpO2 Readings from Last 1 Encounters:   07/02/25 92%       Relevant Results  Results for orders placed or performed during the hospital encounter of 07/02/25 (from the past 24 hours)   CBC and Auto Differential   Result Value Ref Range    WBC 4.7 4.4 - 11.3 x10*3/uL    nRBC 0.0 0.0 - 0.0 /100 WBCs    RBC 3.81 (L) 4.50 - 5.90 x10*6/uL    Hemoglobin 10.6 (L) 13.5 - 17.5 g/dL    Hematocrit 32.1 (L) 41.0 - 52.0 %    MCV 84 80 - 100 fL    MCH 27.8 26.0 - 34.0 pg    MCHC 33.0 32.0 - 36.0 g/dL    RDW 15.4 (H) 11.5 - 14.5 %    Platelets 165 150 - 450 x10*3/uL    Neutrophils % 73.9 40.0 - 80.0 %    Immature Granulocytes %, Automated 0.6 0.0 - 0.9 %    Lymphocytes % 16.2 13.0 - 44.0 %    Monocytes % 6.6 2.0 - 10.0 %    Eosinophils % 2.3 0.0 - 6.0 %    Basophils % 0.4 0.0 - 2.0 %    Neutrophils Absolute 3.46 1.20 - 7.70 x10*3/uL    Immature Granulocytes Absolute, Automated 0.03 0.00 - 0.70 x10*3/uL    Lymphocytes Absolute 0.76 (L) 1.20 - 4.80 x10*3/uL    Monocytes Absolute 0.31 0.10 - 1.00 x10*3/uL    Eosinophils Absolute 0.11 0.00 - 0.70 x10*3/uL    Basophils Absolute 0.02 0.00 - 0.10 x10*3/uL   Comprehensive Metabolic Panel   Result Value Ref Range    Glucose 188 (H) 74 - 99 mg/dL    Sodium 130 (L) 136 - 145 mmol/L     Potassium 5.7 (H) 3.5 - 5.3 mmol/L    Chloride 94 (L) 98 - 107 mmol/L    Bicarbonate 27 21 - 32 mmol/L    Anion Gap 15 10 - 20 mmol/L    Urea Nitrogen 18 6 - 23 mg/dL    Creatinine 6.77 (H) 0.50 - 1.30 mg/dL    eGFR 8 (L) >60 mL/min/1.73m*2    Calcium 10.5 8.6 - 10.6 mg/dL    Albumin 3.7 3.4 - 5.0 g/dL    Alkaline Phosphatase 93 33 - 136 U/L    Total Protein 8.3 (H) 6.4 - 8.2 g/dL    AST 25 9 - 39 U/L    Bilirubin, Total 0.6 0.0 - 1.2 mg/dL    ALT 13 10 - 52 U/L   Lactate   Result Value Ref Range    Lactate 1.8 0.4 - 2.0 mmol/L   Blood Culture    Specimen: Peripheral Venipuncture; Blood culture   Result Value Ref Range    Blood Culture Loaded on Instrument - Culture in progress    Blood Culture    Specimen: Peripheral Venipuncture; Blood culture   Result Value Ref Range    Blood Culture Loaded on Instrument - Culture in progress    Lipase   Result Value Ref Range    Lipase 10 9 - 82 U/L   Phosphorus   Result Value Ref Range    Phosphorus 3.8 2.5 - 4.9 mg/dL   Magnesium   Result Value Ref Range    Magnesium 1.78 1.60 - 2.40 mg/dL   Protime-INR   Result Value Ref Range    Protime 11.9 9.8 - 12.4 seconds    INR 1.1 0.9 - 1.1   Troponin I, High Sensitivity, Initial   Result Value Ref Range    Troponin I, High Sensitivity (CMC) 48 0 - 53 ng/L   Sars-CoV-2 PCR   Result Value Ref Range    Coronavirus 2019, PCR Not Detected Not Detected   Influenza A, and B PCR   Result Value Ref Range    Flu A Result Not Detected Not Detected    Flu B Result Not Detected Not Detected   Troponin, High Sensitivity, 1 Hour   Result Value Ref Range    Troponin I, High Sensitivity ([facility]) 50 0 - 53 ng/L   POCT GLUCOSE   Result Value Ref Range    POCT Glucose 112 (H) 74 - 99 mg/dL   POCT GLUCOSE   Result Value Ref Range    POCT Glucose 188 (H) 74 - 99 mg/dL     *Note: Due to a large number of results and/or encounters for the requested time period, some results have not been displayed. A complete set of results can be found in Results Review.         Microbiology and Culture  Susceptibility data from last 120 days.  Collected Organism   05/25/25 1807 Staphylococcus hominis   05/20/25 2220 Staphylococcus hominis       Physical Exam  Physical Exam  Constitutional:       General: He is in acute distress.      Appearance: He is ill-appearing and toxic-appearing.   HENT:      Head: Normocephalic and atraumatic.      Mouth/Throat:      Mouth: Mucous membranes are dry.      Pharynx: Oropharyngeal exudate (White exudates) present.   Eyes:      General: No scleral icterus.     Extraocular Movements: Extraocular movements intact.   Cardiovascular:      Rate and Rhythm: Tachycardia present.      Heart sounds: No murmur heard.     No gallop (could not appreciate gallop despite volume status).   Pulmonary:      Effort: No respiratory distress.      Breath sounds: Rales present.      Comments: On 6L NC  Chest:      Chest wall: Tenderness present.   Abdominal:      General: There is distension.      Palpations: There is no mass.      Tenderness: There is abdominal tenderness. There is no right CVA tenderness, left CVA tenderness, guarding or rebound.      Hernia: No hernia is present.   Musculoskeletal:         General: Tenderness (Bilateral Shoulder Tenderness) present.      Cervical back: Normal range of motion.      Comments: Left AVF for HD   Skin:     General: Skin is warm.      Capillary Refill: Capillary refill takes 2 to 3 seconds.      Coloration: Skin is pale. Skin is not jaundiced.      Findings: No bruising.      Comments: Patient was hot at the belly, normal towards lower extremities   Neurological:      Mental Status: He is disoriented.      Motor: Weakness (Unable to lift arm or performfeet extension) present.      Comments: AoX1 (only Name)         Imaging  CT chest abdomen pelvis w IV contrast  Result Date: 7/2/2025  1.  Worsening volume overload likely related to heart failure given worsening bilateral pleural effusions and dependent pulmonary  edema/atelectasis and worsening anasarca. Recommend correlation with patient's fluid status an echocardiogram given the presence of moderate to severe cardiomegaly. 2. Worsening portal hypertension with enlarging portal/splenic veins and worsening hepatosplenomegaly as described above. Findings may be related to right-sided heart failure. 3. Large right shoulder fluid/soft tissue collection with associated cortical scalloping of adjacent osseous structures including the right anterior humerus, distal clavicle, and superior scapula compatible with subacute/chronic inflammatory process. This is better characterized on prior MR and remains worrisome for synovitis versus crystal arthropathy. 4. Dilated main pulmonary artery, which can be seen with pulmonary hypertension. 5. Remaining chronic and nonacute findings are described above.     I have reviewed the images/study and I agree with the findings as stated by Ian Dash MD (PGY-3).   MACRO: None   Signed by: Jj Cai 7/2/2025 4:45 PM Dictation workstation:   [id number]    XR chest 1 view  Result Date: 7/2/2025  1.  Bibasilar infiltrates. 2.  Mild vascular congestion. Signed by Amanuel Welsh MD    XR shoulder left 2+ views  Result Date: 6/26/2025  No acute abnormality seen     MACRO: None   Signed by: Brandon Eden 6/26/2025 12:35 PM Dictation workstation:   [id number]    CT chest wo IV contrast  Result Date: 6/23/2025  1. Improved and resolving faint ground-glass opacities remaining in the right upper, right lower and right middle lobes, overall favored to represent pulmonary alveolar edema. 2. Resolved left with interval decrease to small right pleural effusions. 3. Faint mosaic attenuation throughout the bilateral lungs can be seen in small airways/vessel disease. 4. Moderate dilatation of the main pulmonary trunk measuring 3.5 cm can be seen in pulmonary hypertension. 5. Mild hypoattenuation of the blood pool in comparison to the adjacent  myocardium can be seen in anemia. 6. Small right glenohumeral joint effusion with subacromial bursal fluid, likely degenerative. 7. Additional incidental non-acute findings as detailed above.     I personally reviewed the images/study and I agree with the findings as stated by Dr. Mitchell Da Silva. This study was interpreted at [facility], [place], [place].   MACRO: None.   Signed by: Emile Shah 6/23/2025 12:09 PM Dictation workstation:   [id number]    Vascular US upper extremity venous duplex right  Result Date: 6/7/2025  No sonographic evidence of venous thrombosis within the right upper extremity.   I personally reviewed the images/study and I agree with the findings as stated by Daljit Gould MD (Radiology Resident).   MACRO: None   Signed by: Gm Moore 6/7/2025 2:01 PM Dictation workstation:   [id number]    MR shoulder right w and wo IV contrast  Result Date: 6/4/2025  1. Displaced fracture of the coracoid process with smooth, scalloped fracture margins and no evidence of a marrow-replacing lesion, making a pathologic fracture unlikely. Mild underlying cortical scalloping is likely chronic and may relate to a longstanding large right shoulder joint effusion, which demonstrates heterogeneous T2 hypointense signal, most compatible with severe synovitis. This could be secondary to inflammatory or crystal arthropathy. Another diagnostic consideration of the patient is on hemodialysis would be amyloid arthropathy. 2. Complete retracted tearing of the supraspinatus and infraspinatus tendon. 3. Full-thickness tearing of the superior to mid subscapularis from its insertion. 4. Mild supraspinatus muscle atrophy with moderate edema involving the supraspinatus, infraspinatus, and subscapularis muscles. Additional feathery edema pattern within the visualized shoulder musculature, suggestive of myositis or muscle strain. Recommend clinical correlation. 5. The long head biceps tendon  is torn and retracted with superimposed tendinosis of the extra-articular long head biceps tendon. 6. Severe glenohumeral joint osteoarthrosis.     I personally reviewed the images/study and I agree with the findings as stated. This study was interpreted at [facility], [place], [place].   MACRO: None   Signed by: Don Larson 6/4/2025 9:11 AM Dictation workstation:   [id number]    XR chest 1 view  Result Date: 6/3/2025  Moderate pulmonary edema. Enlarged cardiac silhouette     MACRO: None   Signed by: Brandon Eden 6/3/2025 2:49 PM Dictation workstation:   [id number]    XR shoulder right 2+ views  Result Date: 6/3/2025  Subacute fracture through the coracoid process. Impaction fracture through the humeral head difficult to visualized radiographically. Inferior subluxation of the humeral head compatible with underlying large glenohumeral joint effusion.     MACRO: None   Signed by: Brandon Eden 6/3/2025 11:34 AM Dictation workstation:   [id number]    XR chest 2 views  Result Date: 6/3/2025  1.  No evidence of acute cardiopulmonary process.       MACRO: None   Signed by: Brandon Eden 6/3/2025 11:32 AM Dictation workstation:   [id number]       Inpatient Medications  Scheduled Medications[2]  Continuous Medications[3]  PRN Medications[4]             Assessment/Plan     Assessment:  Manolo is a 70 y.o male with past medical history of stage 3 CKD/ESRD on HD TTS with failed kidney transplant x2 (2006, 2013), HTN, HLD presenting with chest discomfort and cough. Imaging findings of b/l pleural effusions is more consistent with volume overload, however with fever and AMS concern for infection in addition to volume overload state. Immunosuppression (on tacro and prednisone) increases risk for infection. Current source of infection is PNA; unable to obtain UA given anuric/oliguric ESRD, blood culture pending, COVID, flu, RSV negative, no history of diarrhea concerning for  gastroenteritis, no evidence of intraabdominal infection on CT a/p. New O2 requirement of 6L with tachypnea.  patient was febrile and is immunosuppressed with new O2 requirement will continue treatment for PNA.     Acute Hospital Problems:     ##Bilateral Pneumonia   Lactate wnl, s/p 500 ml bolus, bolus with caution given fluid overload   ->Negative Flu, covid,   -> Pending Bcx (7/2), procalcitonin, MRSA, Pna workup  -> Day 1 abx: Currently on zosyn q8 and vanc on 7/2  ->Received singular dose of zosyn and vanc in ED (7/2)  ->Tylenol prn for pain and fever   ##C/B Acute Hypoxic Respiratory Failure 2/2 suspected Right parapneumonic effusions vs bilateral pleural effusions  Does not require oxygen at baseline  -> Currently on 6L NC  ##C/B Acute encephalopathy  -> Okay to eat per nurse bedside swallow    ##Volume Overload 2/2 ESRD vs decompensated HFpEF  Volume exam demonstrates 1+ LE edema (laying down), abdominal distension with positive fluid waves and dullness on percussion. CXR (7/2) shows bibasilar infiltrates, mild vascular congestion. CTAP w/ contrast shows worsening pleural effusions, dependent pulmonary edema, atelectasis, and anasarca. Worsening portal HTN with enlarging portal/splenic veins and hepatosplenomegaly. Dilated main pulmonary artery.  ##Stage 3 CKD/ESRD s/p bilateral failed kidney transplant on HD TTS via Left AVF  Not a current transplant candidate per 7/24 committee note due to multiple hospitalizations. Both transplanted kidney are not functional however patient is currently on tacrolimus 0.5 mg daily and prednisone 5mg daily. Patient is anuric at baseline and dependent on HD for fluid removal. Currently his Cr is 6.77 with a baseline range of 5-9, estimated CrCl is 10 ml/min.    -> Transplant Nephrology Consulted   -> Tacrolimus Held, get morning trough levels, continue prednisone  -> Dialysis Nephrology Consulted   -> iHD dialysis tonight on 7/2 for fluid removal, Given lokelma  7/2  ##Decompensated Heart failure with preserved Ejection Fraction  Chronic HFpEF with recent echo on 1/25 showing EF of 55%. Concern for decompensation due to cardiomegaly on CTAP.  -> Repeat echocardiogram  -> Pending BNP  ##C/B Electrolyte Abnormalities  -> Hyperkalemia s/p HyperK coctail    ##Hypertension  Admitted with systolic in 200  -> Continue home regimen: Coreg 37.5 BID, isosorbid mononitrate 30 mg daily, Nifedipine 90 mg BID    Chronic Medical Problems:     ##T2DM  -> Glargine long acting and SSI    ##MSK Pain, Shoulder  Shoulder pain managed with opioids. ED gave 0.5mg dilaudid  -> Held Norco and Lyrica and stopped percocet in the setting of somnolence  -> Continue Lidocaine patch for both shoulders    ##Gastroparesis  -> continued reglan 5 mg Tid with meals  -> Zofran Prn held, pending ECG read for normal QT    ##GERD  -> continue home protonix 40 mg daily    ##HLD  -> Continue Home Pravastatin      Fluids: Replete PRN  Electrolytes: Replete PRN  Nutrition: Adult diet Renal; Potassium Restricted 2 gm (50mEq); 2 - 3 grams Sodium  Antimicrobials: piperacillin-tazobactam - 2.25 gram/50 mL  vancomycin  DVT PPX: Heparin subcutaneous   Bowel Regimen: Miralax PRN  LDA: Right Forearm PIV   Oxygen: 6L NC  Disposition: Home, Pending SW  Code Status: Full Code  NOK: POA is daughter Purnima Bashir, [phone]  Secondary contact is  Amalia Enriquez, [phone]        Aamir Nuñez MD  Internal Medicine PGY-1    Patient discussed with attending physician, Debbie Brown MD, who agrees with plan.              [1]   Family History  Problem Relation Name Age of Onset    Bone cancer Mother      Other (corona's sarcome of the bone marrow) Mother      Prostate cancer Father      Diabetes Other Family Hist     Hypertension Other Family Hist    [2] carvedilol, 37.5 mg, oral, BID  heparin (porcine), 5,000 Units, subcutaneous, q8h  [START ON 7/3/2025] insulin glargine, 10 Units, subcutaneous, q24h  insulin lispro,  0-10 Units, subcutaneous, Before meals & nightly  isosorbide mononitrate ER, 30 mg, oral, Daily  lidocaine, 2 patch, transdermal, Daily  [START ON 7/3/2025] metoclopramide, 5 mg, oral, TID AC  NIFEdipine ER, 90 mg, oral, BID  [START ON 7/3/2025] pantoprazole, 40 mg, oral, Daily before breakfast  piperacillin-tazobactam, 2.25 g, intravenous, q8h  pravastatin, 10 mg, oral, Nightly  predniSONE, 5 mg, oral, Daily  [Held by provider] pregabalin, 25 mg, oral, Nightly  vitamin B complex-vitamin C-folic acid, 1 capsule, oral, Daily     [3]    [4] PRN medications: acetaminophen **OR** acetaminophen, [Held by provider] cyclobenzaprine, dextrose, dextrose, glucagon, glucagon, [Held by provider] HYDROcodone-acetaminophen, [Held by provider] ondansetron, polyethylene glycol, sodium chloride, vancomycin

## 2025-07-03 ENCOUNTER — APPOINTMENT (OUTPATIENT)
Dept: CARDIOLOGY | Facility: HOSPITAL | Age: 70
End: 2025-07-03
Payer: COMMERCIAL

## 2025-07-03 LAB
ALBUMIN SERPL BCP-MCNC: 2.6 G/DL (ref 3.4–5)
ALP SERPL-CCNC: 104 U/L (ref 33–136)
ALT SERPL W P-5'-P-CCNC: 60 U/L (ref 10–52)
ANION GAP SERPL CALC-SCNC: 11 MMOL/L (ref 10–20)
AORTIC VALVE PEAK VELOCITY: 2.19 M/S
AST SERPL W P-5'-P-CCNC: 55 U/L (ref 9–39)
ATRIAL RATE: 98 BPM
AV PEAK GRADIENT: 19 MMHG
AVA (PEAK VEL): 2.11 CM2
BILIRUB SERPL-MCNC: 0.6 MG/DL (ref 0–1.2)
BNP SERPL-MCNC: 1720 PG/ML (ref 0–99)
BUN SERPL-MCNC: 11 MG/DL (ref 6–23)
CALCIUM SERPL-MCNC: 9 MG/DL (ref 8.6–10.6)
CHLORIDE SERPL-SCNC: 99 MMOL/L (ref 98–107)
CO2 SERPL-SCNC: 29 MMOL/L (ref 21–32)
CREAT SERPL-MCNC: 4.82 MG/DL (ref 0.5–1.3)
EGFRCR SERPLBLD CKD-EPI 2021: 12 ML/MIN/1.73M*2
EJECTION FRACTION APICAL 4 CHAMBER: 46.9
EJECTION FRACTION: 48 %
ERYTHROCYTE [DISTWIDTH] IN BLOOD BY AUTOMATED COUNT: 15.5 % (ref 11.5–14.5)
GLUCOSE BLD MANUAL STRIP-MCNC: 145 MG/DL (ref 74–99)
GLUCOSE BLD MANUAL STRIP-MCNC: 206 MG/DL (ref 74–99)
GLUCOSE BLD MANUAL STRIP-MCNC: 238 MG/DL (ref 74–99)
GLUCOSE BLD MANUAL STRIP-MCNC: 275 MG/DL (ref 74–99)
GLUCOSE SERPL-MCNC: 140 MG/DL (ref 74–99)
HCT VFR BLD AUTO: 28.5 % (ref 41–52)
HGB BLD-MCNC: 8.8 G/DL (ref 13.5–17.5)
LEFT VENTRICLE INTERNAL DIMENSION DIASTOLE: 6.15 CM (ref 3.5–6)
LEFT VENTRICULAR OUTFLOW TRACT DIAMETER: 2.21 CM
MCH RBC QN AUTO: 28.3 PG (ref 26–34)
MCHC RBC AUTO-ENTMCNC: 30.9 G/DL (ref 32–36)
MCV RBC AUTO: 92 FL (ref 80–100)
MITRAL VALVE E/A RATIO: 1.16
NRBC BLD-RTO: 0 /100 WBCS (ref 0–0)
P AXIS: 77 DEGREES
P OFFSET: 176 MS
P ONSET: 125 MS
PLATELET # BLD AUTO: 121 X10*3/UL (ref 150–450)
POTASSIUM SERPL-SCNC: 4.8 MMOL/L (ref 3.5–5.3)
PR INTERVAL: 182 MS
PROCALCITONIN SERPL-MCNC: 1.01 NG/ML
PROT SERPL-MCNC: 6.2 G/DL (ref 6.4–8.2)
Q ONSET: 216 MS
QRS COUNT: 16 BEATS
QRS DURATION: 140 MS
QT INTERVAL: 344 MS
QTC CALCULATION(BAZETT): 439 MS
QTC FREDERICIA: 405 MS
R AXIS: -51 DEGREES
RBC # BLD AUTO: 3.11 X10*6/UL (ref 4.5–5.9)
SODIUM SERPL-SCNC: 134 MMOL/L (ref 136–145)
T AXIS: 65 DEGREES
T OFFSET: 388 MS
TACROLIMUS BLD-MCNC: <2 NG/ML
VENTRICULAR RATE: 98 BPM
WBC # BLD AUTO: 4.3 X10*3/UL (ref 4.4–11.3)

## 2025-07-03 PROCEDURE — 2500000005 HC RX 250 GENERAL PHARMACY W/O HCPCS

## 2025-07-03 PROCEDURE — 80197 ASSAY OF TACROLIMUS: CPT

## 2025-07-03 PROCEDURE — 6350000001 HC RX 635 EPOETIN >10,000 UNITS: Mod: JW | Performed by: NURSE PRACTITIONER

## 2025-07-03 PROCEDURE — 36415 COLL VENOUS BLD VENIPUNCTURE: CPT

## 2025-07-03 PROCEDURE — 2500000001 HC RX 250 WO HCPCS SELF ADMINISTERED DRUGS (ALT 637 FOR MEDICARE OP)

## 2025-07-03 PROCEDURE — 85027 COMPLETE CBC AUTOMATED: CPT

## 2025-07-03 PROCEDURE — 2500000002 HC RX 250 W HCPCS SELF ADMINISTERED DRUGS (ALT 637 FOR MEDICARE OP, ALT 636 FOR OP/ED)

## 2025-07-03 PROCEDURE — 2500000004 HC RX 250 GENERAL PHARMACY W/ HCPCS (ALT 636 FOR OP/ED)

## 2025-07-03 PROCEDURE — 82947 ASSAY GLUCOSE BLOOD QUANT: CPT

## 2025-07-03 PROCEDURE — 93308 TTE F-UP OR LMTD: CPT | Performed by: INTERNAL MEDICINE

## 2025-07-03 PROCEDURE — 97161 PT EVAL LOW COMPLEX 20 MIN: CPT | Mod: GP

## 2025-07-03 PROCEDURE — 93325 DOPPLER ECHO COLOR FLOW MAPG: CPT | Performed by: INTERNAL MEDICINE

## 2025-07-03 PROCEDURE — 8010000001 HC DIALYSIS - HEMODIALYSIS PER DAY

## 2025-07-03 PROCEDURE — 2500000004 HC RX 250 GENERAL PHARMACY W/ HCPCS (ALT 636 FOR OP/ED): Mod: JW

## 2025-07-03 PROCEDURE — 93321 DOPPLER ECHO F-UP/LMTD STD: CPT | Performed by: INTERNAL MEDICINE

## 2025-07-03 PROCEDURE — 99223 1ST HOSP IP/OBS HIGH 75: CPT | Performed by: INTERNAL MEDICINE

## 2025-07-03 PROCEDURE — 99233 SBSQ HOSP IP/OBS HIGH 50: CPT

## 2025-07-03 PROCEDURE — 1200000002 HC GENERAL ROOM WITH TELEMETRY DAILY

## 2025-07-03 PROCEDURE — 2500000004 HC RX 250 GENERAL PHARMACY W/ HCPCS (ALT 636 FOR OP/ED): Performed by: INTERNAL MEDICINE

## 2025-07-03 PROCEDURE — 93325 DOPPLER ECHO COLOR FLOW MAPG: CPT

## 2025-07-03 PROCEDURE — 84075 ASSAY ALKALINE PHOSPHATASE: CPT

## 2025-07-03 RX ORDER — VANCOMYCIN HYDROCHLORIDE 500 MG/100ML
500 INJECTION, SOLUTION INTRAVENOUS ONCE
Status: COMPLETED | OUTPATIENT
Start: 2025-07-03 | End: 2025-07-03

## 2025-07-03 RX ORDER — AZITHROMYCIN 500 MG/1
500 TABLET, FILM COATED ORAL DAILY
Status: COMPLETED | OUTPATIENT
Start: 2025-07-03 | End: 2025-07-05

## 2025-07-03 RX ORDER — OXYCODONE HYDROCHLORIDE 5 MG/1
5 TABLET ORAL EVERY 6 HOURS PRN
Refills: 0 | Status: DISCONTINUED | OUTPATIENT
Start: 2025-07-03 | End: 2025-07-05 | Stop reason: HOSPADM

## 2025-07-03 RX ADMIN — HEPARIN SODIUM 5000 UNITS: 5000 INJECTION, SOLUTION INTRAVENOUS; SUBCUTANEOUS at 04:29

## 2025-07-03 RX ADMIN — EPOETIN ALFA 7000 UNITS: 10000 SOLUTION INTRAVENOUS; SUBCUTANEOUS at 23:57

## 2025-07-03 RX ADMIN — ISOSORBIDE MONONITRATE 30 MG: 30 TABLET, EXTENDED RELEASE ORAL at 12:28

## 2025-07-03 RX ADMIN — LIDOCAINE 4% 2 PATCH: 40 PATCH TOPICAL at 09:01

## 2025-07-03 RX ADMIN — METOCLOPRAMIDE 5 MG: 5 TABLET ORAL at 16:26

## 2025-07-03 RX ADMIN — NIFEDIPINE 90 MG: 90 TABLET, FILM COATED, EXTENDED RELEASE ORAL at 20:30

## 2025-07-03 RX ADMIN — AZITHROMYCIN DIHYDRATE 500 MG: 500 TABLET ORAL at 12:28

## 2025-07-03 RX ADMIN — OXYCODONE HYDROCHLORIDE 5 MG: 5 TABLET ORAL at 16:32

## 2025-07-03 RX ADMIN — PIPERACILLIN SODIUM AND TAZOBACTAM SODIUM 2.25 G: 2; .25 INJECTION, SOLUTION INTRAVENOUS at 20:31

## 2025-07-03 RX ADMIN — OXYCODONE HYDROCHLORIDE 5 MG: 5 TABLET ORAL at 09:33

## 2025-07-03 RX ADMIN — CARVEDILOL 37.5 MG: 6.25 TABLET, FILM COATED ORAL at 09:00

## 2025-07-03 RX ADMIN — PIPERACILLIN SODIUM AND TAZOBACTAM SODIUM 2.25 G: 2; .25 INJECTION, SOLUTION INTRAVENOUS at 04:29

## 2025-07-03 RX ADMIN — PRAVASTATIN SODIUM 10 MG: 20 TABLET ORAL at 20:31

## 2025-07-03 RX ADMIN — HEPARIN SODIUM 5000 UNITS: 5000 INJECTION, SOLUTION INTRAVENOUS; SUBCUTANEOUS at 12:27

## 2025-07-03 RX ADMIN — PANTOPRAZOLE SODIUM 40 MG: 40 TABLET, DELAYED RELEASE ORAL at 06:31

## 2025-07-03 RX ADMIN — CARVEDILOL 37.5 MG: 6.25 TABLET, FILM COATED ORAL at 20:30

## 2025-07-03 RX ADMIN — INSULIN LISPRO 4 UNITS: 100 INJECTION, SOLUTION INTRAVENOUS; SUBCUTANEOUS at 17:57

## 2025-07-03 RX ADMIN — INSULIN LISPRO 6 UNITS: 100 INJECTION, SOLUTION INTRAVENOUS; SUBCUTANEOUS at 12:28

## 2025-07-03 RX ADMIN — ACETAMINOPHEN 650 MG: 325 TABLET ORAL at 20:43

## 2025-07-03 RX ADMIN — PREDNISONE 5 MG: 5 TABLET ORAL at 12:28

## 2025-07-03 RX ADMIN — PERFLUTREN 3 ML OF DILUTION: 6.52 INJECTION, SUSPENSION INTRAVENOUS at 11:25

## 2025-07-03 RX ADMIN — PIPERACILLIN SODIUM AND TAZOBACTAM SODIUM 2.25 G: 2; .25 INJECTION, SOLUTION INTRAVENOUS at 12:27

## 2025-07-03 RX ADMIN — NIFEDIPINE 90 MG: 90 TABLET, FILM COATED, EXTENDED RELEASE ORAL at 09:01

## 2025-07-03 RX ADMIN — ACETAMINOPHEN 650 MG: 325 TABLET ORAL at 04:37

## 2025-07-03 RX ADMIN — INSULIN LISPRO 4 UNITS: 100 INJECTION, SOLUTION INTRAVENOUS; SUBCUTANEOUS at 20:31

## 2025-07-03 RX ADMIN — PREGABALIN 25 MG: 25 CAPSULE ORAL at 20:31

## 2025-07-03 RX ADMIN — ASCORBIC ACID, THIAMINE MONONITRATE,RIBOFLAVIN, NIACINAMIDE, PYRIDOXINE HYDROCHLORIDE, FOLIC ACID, CYANOCOBALAMIN, BIOTIN, CALCIUM PANTOTHENATE, 1 CAPSULE: 100; 1.5; 1.7; 20; 10; 1; 6000; 150000; 5 CAPSULE, LIQUID FILLED ORAL at 09:01

## 2025-07-03 RX ADMIN — METOCLOPRAMIDE 5 MG: 5 TABLET ORAL at 06:31

## 2025-07-03 RX ADMIN — HEPARIN SODIUM 5000 UNITS: 5000 INJECTION, SOLUTION INTRAVENOUS; SUBCUTANEOUS at 18:00

## 2025-07-03 RX ADMIN — INSULIN GLARGINE 10 UNITS: 100 INJECTION, SOLUTION SUBCUTANEOUS at 09:01

## 2025-07-03 RX ADMIN — METOCLOPRAMIDE 5 MG: 5 TABLET ORAL at 12:28

## 2025-07-03 RX ADMIN — VANCOMYCIN HYDROCHLORIDE 500 MG: 500 INJECTION, SOLUTION INTRAVENOUS at 08:59

## 2025-07-03 ASSESSMENT — PAIN SCALES - GENERAL
PAINLEVEL_OUTOF10: 6
PAINLEVEL_OUTOF10: 8

## 2025-07-03 ASSESSMENT — COGNITIVE AND FUNCTIONAL STATUS - GENERAL
CLIMB 3 TO 5 STEPS WITH RAILING: A LITTLE
WALKING IN HOSPITAL ROOM: A LITTLE
MOBILITY SCORE: 22
MOBILITY SCORE: 21
WALKING IN HOSPITAL ROOM: A LITTLE
DAILY ACTIVITIY SCORE: 23
CLIMB 3 TO 5 STEPS WITH RAILING: A LITTLE
TOILETING: A LITTLE
STANDING UP FROM CHAIR USING ARMS: A LITTLE

## 2025-07-03 ASSESSMENT — PAIN - FUNCTIONAL ASSESSMENT
PAIN_FUNCTIONAL_ASSESSMENT: UNABLE TO ASSESS
PAIN_FUNCTIONAL_ASSESSMENT: UNABLE TO SELF-REPORT
PAIN_FUNCTIONAL_ASSESSMENT: 0-10

## 2025-07-03 ASSESSMENT — ACTIVITIES OF DAILY LIVING (ADL)
ADL_ASSISTANCE: INDEPENDENT
LACK_OF_TRANSPORTATION: NO

## 2025-07-03 NOTE — CARE PLAN
The patient's goals for the shift include      The clinical goals for the shift include Patient will be safe throughout the shift.      Problem: Safety - Adult  Goal: Free from fall injury  Outcome: Progressing     Problem: Skin  Goal: Decreased wound size/increased tissue granulation at next dressing change  Outcome: Progressing  Flowsheets (Taken 7/2/2025 2000)  Decreased wound size/increased tissue granulation at next dressing change: Promote sleep for wound healing  Goal: Participates in plan/prevention/treatment measures  Outcome: Progressing  Flowsheets (Taken 7/2/2025 2000)  Participates in plan/prevention/treatment measures: Elevate heels  Goal: Prevent/manage excess moisture  Outcome: Progressing  Flowsheets (Taken 7/2/2025 2000)  Prevent/manage excess moisture: Monitor for/manage infection if present  Goal: Prevent/minimize sheer/friction injuries  Outcome: Progressing  Flowsheets (Taken 7/2/2025 2000)  Prevent/minimize sheer/friction injuries: HOB 30 degrees or less  Goal: Promote/optimize nutrition  Outcome: Progressing  Flowsheets (Taken 7/2/2025 2000)  Promote/optimize nutrition:   Monitor/record intake including meals   Consume > 50% meals/supplements  Goal: Promote skin healing  Outcome: Progressing  Flowsheets (Taken 7/2/2025 2000)  Promote skin healing:   Assess skin/pad under line(s)/device(s)   Turn/reposition every 2 hours/use positioning/transfer devices

## 2025-07-03 NOTE — CARE PLAN
The patient's goals for the shift include      The clinical goals for the shift include Patient will be safe throughout the shift.      Problem: Safety - Adult  Goal: Free from fall injury  7/2/2025 2005 by Sanjeev Swenson RN  Outcome: Progressing  7/2/2025 2000 by Sanjeev Swenson RN  Outcome: Progressing

## 2025-07-03 NOTE — CARE PLAN
Nephrology consulted for this patient with ESRD, admitted with fever and shortness of breath. Last HD reported 7/1.  Presentation significant for high oxygen requirements, tachypnea, bilateral infiltrates, and pleural effusions on CXR and and fluid overload on CT. BP stable.  Discussed with medical team, he is being treated with antibiotics for pneumonia, he may benefit from dialysis tonight for optimization or fluid status and hyperkalemia.  Full consult to follow

## 2025-07-03 NOTE — PROGRESS NOTES
07/03/25 1244   Discharge Planning   Living Arrangements Children   Support Systems Children;Family members   Assistance Needed none   Type of Residence Private residence   Home or Post Acute Services None   Expected Discharge Disposition Home   Does the patient need discharge transport arranged? No   Financial Resource Strain   How hard is it for you to pay for the very basics like food, housing, medical care, and heating? Not hard   Housing Stability   In the last 12 months, was there a time when you were not able to pay the mortgage or rent on time? N   In the past 12 months, how many times have you moved where you were living? 0   At any time in the past 12 months, were you homeless or living in a shelter (including now)? N   Transportation Needs   In the past 12 months, has lack of transportation kept you from medical appointments or from getting medications? no   In the past 12 months, has lack of transportation kept you from meetings, work, or from getting things needed for daily living? No       Plan per Medical/Surgical team: Patient was admitted for chest discomfort and cough. Patient is being treated for pneumonia.    Demographics/Insurance: verified.  Living Environment: Patient lives with his daughter.    [facility] Care: [facility]  PCP: Elma Hutton APRN-CNP  Pharmacy: [facility]  DME: denies  Falls: denies  Dialysis: [facility]  Social Work Needs: denies any financial or social work needs  Transportation at discharge: daughter will provide transportation at discharge    Potential Barriers: none  Discharge Disposition: home  ADOD:  1-2 days      Kristen Omalley RN, BSN  Transitional Care Coordinator

## 2025-07-03 NOTE — CONSULTS
Inpatient consult to Nephrology Dialysis  Consult performed by: Jose Olmstead MD  Consult ordered by: Jonathan Espino DO      NEPHROLOGY NEW CONSULT NOTE   Patient ID: Manolo Bashir is a 69 y.o. male.     Reason for consult: ESRD-HD    Chief Complaint   Patient presents with    Chest Pain        Chest Pain       Manolo Bashir is a 69 y.o. male   - With past medical Hx as below   - Admitted for fever, shortness of breath  - Nephrology was consulted for ESRD management     TTS [facility]/Dr Bridges. last HD 7/1/25 post weight 66.4; DW 66.5; SPENSER AVG. Labs : K 5.7, Hgb 10.6, /61. Retacrit 7000 units     Medical History[1]   Surgical History[2]   Family History[3]  Allergies[4]   Meds:   azithromycin, 500 mg, Daily  carvedilol, 37.5 mg, BID  heparin (porcine), 5,000 Units, q8h  insulin glargine, 10 Units, q24h  insulin lispro, 0-10 Units, Before meals & nightly  isosorbide mononitrate ER, 30 mg, Daily  lidocaine, 2 patch, Daily  metoclopramide, 5 mg, TID AC  NIFEdipine ER, 90 mg, BID  pantoprazole, 40 mg, Daily before breakfast  piperacillin-tazobactam, 2.25 g, q8h  pravastatin, 10 mg, Nightly  predniSONE, 5 mg, Daily  pregabalin, 25 mg, Nightly  vitamin B complex-vitamin C-folic acid, 1 capsule, Daily         acetaminophen, 650 mg, q4h PRN   Or  acetaminophen, 650 mg, q4h PRN  cyclobenzaprine, 5 mg, BID PRN  dextrose, 12.5 g, q15 min PRN  dextrose, 25 g, q15 min PRN  glucagon, 1 mg, q15 min PRN  glucagon, 1 mg, q15 min PRN  ondansetron, 4 mg, q4h PRN  oxyCODONE, 5 mg, q6h PRN  polyethylene glycol, 17 g, Daily PRN  sodium chloride, 1 spray, 4x daily PRN  vancomycin, , Daily PRN        Heart Rate:  []   Temp:  [36.5 °C (97.7 °F)-38.5 °C (101.3 °F)]   Resp:  [15-33]   BP: (110-177)/(54-83)   SpO2:  [92 %-97 %]    Weight: 68 kg (150 lb)   General appearance: Awake and alert, oriented, . No distress  HEENT: supple, moist oral mucosa, no mouth ulcers  Neck: No JVD  Skin: no apparent rash  Heart: heart sounds  1 & 2 present and normal, no murmurs heard or friction rub  Lungs: Adequate air entry,  occ crackles  Abdomen: soft, non tender, no masses palpated, no flank tenderness  Extremities: No  edema, no joint swelling,  ACCESS: SPENSER AVG       Results from last 72 hours   Lab Units 07/03/25  0624 07/02/25  2203   SODIUM mmol/L 134* 130*   POTASSIUM mmol/L 4.8 5.7*   CO2 mmol/L 29 30   BUN mg/dL 11 20   CREATININE mg/dL 4.82* 8.01*   PHOSPHORUS mg/dL  --  3.7   CALCIUM mg/dL 9.0 9.2   ALBUMIN g/dL 2.6* 2.6*   GLUCOSE mg/dL 140* 207*   WBC AUTO x10*3/uL 4.3*  --         A/P  ESRD-HD admitted with fever and shortness of breath. Possible pneumonia. Last HD reported 7/1.  Presentation significant for high oxygen requirements, tachypnea, bilateral infiltrates, and pleural effusions on CXR and and fluid overload on CT. BP stable.  Dialyzed emergently overnight for  optimization or fluid status and hyperkalemia. Tolerated well,    Plan next HD 7/4   Access: no issues   BP: acceptable d  Renal Diet   Daily renal MVI   Continue 3 x per week hemodialysis; SW to ensure availability of o/p HD chair at discharge    Jose Olmstead MD            [1]   Past Medical History:  Diagnosis Date    Anemia     Arthritis     Cataract     Chronic kidney disease, stage 3 unspecified (Multi) 09/26/2018    Stage 3 chronic kidney disease    CKD (chronic kidney disease)     stage V    Cough 02/12/2024    COVID-19 06/18/2020    COVID-19 virus infection    Diabetes (Multi)     ESRD (end stage renal disease) (Multi)     Focal and segmental proliferative glomerulonephritis 12/23/2023    HTN (hypertension)     Hyperlipidemia     Other long term (current) drug therapy 07/20/2021    High risk medication use    Personal history of other diseases of the circulatory system     Personal history of cardiac murmur    Personal history of other infectious and parasitic diseases 08/17/2015    History of hepatitis    Polyp, colonic 08/17/2023    Primary osteoarthritis  of both ankles 08/17/2023    Prostate cancer (Multi)     Tubular adenoma of colon 08/17/2023    Unspecified kidney failure 08/17/2016    Renal failure   [2]   Past Surgical History:  Procedure Laterality Date    ILEOSTOMY  04/25/2017    Ileostomy    ILEOSTOMY CLOSURE  08/17/2015    Ileostomy Closure    OTHER SURGICAL HISTORY  04/21/2017    Right Hemicolectomy    OTHER SURGICAL HISTORY  08/17/2015    Arteriovenous Surgery Creation Of A-V Fistula    OTHER SURGICAL HISTORY  08/17/2015    Sigmoidoscopy (Fiberoptic, Therapeutic )    PROSTATECTOMY  10/11/2013    Prostatectomy Radical    TRANSPLANT, KIDNEY, OPEN  1992    TRANSPLANT, KIDNEY, OPEN  2013    US GUIDED PERCUTANEOUS BIOPSY RENAL LEFT Left 11/20/2023    US GUIDED PERCUTANEOUS BIOPSY RENAL LEFT 11/20/2023 Lou Rodgers MD [facility]    US GUIDED PERCUTANEOUS PERITONEAL OR RETROPERITONEAL FLUID COLLECTION DRAINAGE  10/20/2022    US GUIDED PERCUTANEOUS PERITONEAL OR RETROPERITONEAL FLUID COLLECTION DRAINAGE 10/20/2022 [facility] CLINICAL LEGACY   [3]   Family History  Problem Relation Name Age of Onset    Bone cancer Mother      Other (corona's sarcome of the bone marrow) Mother      Prostate cancer Father      Diabetes Other Family Hist     Hypertension Other Family Hist    [4] No Known Allergies

## 2025-07-03 NOTE — PROGRESS NOTES
Pharmacy Medication History Review    Manolo Bashir is a 69 y.o. male admitted for Pneumonia of both lower lobes due to infectious organism. Pharmacy reviewed the patient's [id number] medications and allergies for accuracy.    Medications ADDED:  None  Medications CHANGED:  None  Medications REMOVED:   None    The list below reflects the updated PTA list.   Prior to Admission Medications   Prescriptions Last Dose Informant   Easy Touch Alcohol Prep Pads pads, medicated  Self   HYDROcodone-acetaminophen (Norco) 5-325 mg tablet     Sig: Take 1 tablet by mouth 2 times a day as needed for severe pain (7 - 10) for up to 14 days.   NIFEdipine ER (Adalat CC) 90 mg 24 hr tablet  Self   Sig: Take 1 tablet (90 mg) by mouth 2 times a day. Do not crush, chew, or split.   blood sugar diagnostic (True Metrix Glucose Test Strip)  Self   Sig: For BG check 4x/day   carvedilol (Coreg) 12.5 mg tablet  Self   Sig: Take 3 tablets (37.5 mg) by mouth 2 times a day. Hold for systolic BP <140 and HR <60. PATIENT NEEDS TO FOLLOW UP WITH CARDIOLOGY   clobetasol (Temovate) 0.05 % external solution  Self   Sig: Apply topically 2 times a day for 14 days. Use on scalp nightly if needed for itch   clotrimazole (Lotrimin) 1 % cream  Self   Sig: Apply topically 2 times a day.   cyclobenzaprine (Flexeril) 10 mg tablet  Self   Sig: Take 0.5 tablets (5 mg) by mouth 2 times a day as needed for muscle spasms for up to 10 doses.   fluocinonide (Lidex) 0.05 % ointment  Self   Sig: Apply to affected areas twice daily when active as needed. Use less than 14 days per month.   glucagon (Gvoke HypoPen 1-Pack) 1 mg/0.2 mL auto-injector  Self   Sig: Inject 1 mg into the muscle every 15 minutes if needed (For blood glucose 41 to 70 mg/dL and no IV access).  PT reports taking, bur has no recent fill history.   imiquimod (Aldara) 5 % cream  Self   Sig: Use daily for 6 weeks on penis, right on that white and bumpy area  Put a layer about 2 quarter size wide  "on that area, very thin layer, and cover with vaseline   insulin glargine (Lantus) 100 unit/mL (3 mL) pen  Self   Si units daily   insulin lispro (HumaLOG) 100 unit/mL pen  Self   Si units with lunch and dinner plus a sliding scale up to 20 units daily   isosorbide mononitrate ER (Imdur) 30 mg 24 hr tablet  Self   Sig: Take 1 tablet (30 mg) by mouth once daily. Do not crush or chew.   lancets 33 gauge misc  Self   Si each 3 times a day.   lancing device misc  Self   Sig: use as directed   lidocaine 4 % patch  Self   Sig: Apply one patch topically for 12 hours on and 12 hours off.   metoclopramide (Reglan) 5 mg tablet  Self   Sig: Take 1 tablet (5 mg) by mouth 3 times a day before meals.   naloxone (Narcan) 4 mg/0.1 mL nasal spray  Self   Sig: Administer 1 spray (4 mg) into affected nostril(s) if needed for opioid reversal. May repeat every 2-3 minutes if needed, alternating nostrils, until medical assistance becomes available.   oxyCODONE-acetaminophen (Percocet) 5-325 mg tablet     Sig: Take 1 tablet by mouth every 4 hours if needed for severe pain (7 - 10) for up to 7 days.   pantoprazole (ProtoNix) 40 mg EC tablet  Self   Sig: Take 1 tablet (40 mg) by mouth once daily in the morning. Take before meals. Do not crush, chew, or split. Do not start before 2024.   pen needle, diabetic (TechLITE Pen Needle) 32 gauge x 5/32\" needle  Self   Sig: Use 4 a day as directed   polyethylene glycol (Glycolax, Miralax) 17 gram/dose powder  Self   Sig: Mix 17 g of powder and drink once daily. Do not fill before 2025.   pravastatin (Pravachol) 10 mg tablet  Self   Sig: Take 1 tablet (10 mg) by mouth once daily at bedtime.   predniSONE (Deltasone) 5 mg tablet  Self   Sig: Take 1 tablet (5 mg) by mouth once daily.   pregabalin (Lyrica) 25 mg capsule  Self   Sig: Take 1 capsule (25 mg) by mouth once daily at bedtime.   sevelamer carbonate (Renvela) 800 mg tablet  Self   Sig: Take 1 tablet (800 mg) by " "mouth 3 times a day before meals. Swallow tablet whole; do not crush, break, or chew.  PT reports taking, but has no recent fill history.   sodium chloride (Ocean) 0.65 % nasal spray  Self   Sig: Administer 1 spray into each nostril 4 times a day as needed for congestion.   syringe with needle 3 mL 25 x 5/8\" syringe  Self   Sig: Use to inject epogen.   tacrolimus (Prograf) 0.5 mg capsule  Self   Sig: Take 1 capsule (0.5 mg) by mouth once daily.   tacrolimus (Protopic) 0.1 % ointment  Self   Sig: Apply topically 2 times a day.   vitamin B complex-vitamin C-folic acid (Nephrocaps) 1 mg capsule  Self   Sig: Take 1 capsule by mouth once daily.      Facility-Administered Medications: None        The list below reflects the updated allergy list. Please review each documented allergy for additional clarification and justification.  Allergies  Reviewed by Urban López on 7/3/2025   No Known Allergies         Patient declines M2B at discharge.     Sources:   -Patient interview, Good historian   -Outpatient pharmacy dispense history  -OARRS  -Care everywhere  -Chart review      Additional Comments:  PT reports taking both Oxycodone-acetaminophen (Percocet) and Hydrocodone-acetaminophen as needed.      URBAN LÓPEZ  Pharmacy Technician  07/03/25     Secure Chat preferred   If no response call [phone] or [employer] \"Med Rec\"    "

## 2025-07-03 NOTE — HOSPITAL COURSE
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 Presented to us with chest discomfort, cough, and N/V. Patient was found to be febrile, acute hypoxic respiratory failure (AHRF), signs of volume overload on exam and imaging, altered, and electrolytes abnormalities. Patient was admitted for concern for pneumonia and management of volume overload.     At [facility] ED, Patient acute fever and hypoxia was managed with tylenol and placed on 6L NC; antibiotics was also started and small fluids given. Imaging showed cardiomegaly with signs of volume overload including pleural effusions, pulmonary edema which has worsened from past imaging. Patient was admitted for concern of infection (fever, procal, AMS, and immunosuppression) in the setting of volume overload.     On the floor, Patient received dialysis at midnight, AMS and electrolytes abnormalities resolved on 7/3. Transplant nephrology following and recommended discontinuing tacrolimus due to failure of bilateral renal transplants, however continue prednisone. Patient received 3 dialysis sessions with no complications to reduce is volume overload status. Discharged with Augmentin to finish his 7 day course of antibiotics.    Repeat echocardiogram was performed due to concerned of decompensated heart failure and showed a decreased LVEF (40-50% from 55%) with improved diastolic function (grade 2 from grade 3). Cardiology consulted started losartan given his elevated blood pressures during his stay and recommend follow-up with his outpatient cardiologists due to his relative stable nature of his heart failure.

## 2025-07-03 NOTE — PROGRESS NOTES
Physical Therapy    Physical Therapy Evaluation    Patient Name: Manolo Bashir  MRN: [id number]  Department: [place] [address]  Room: 60/6013-A  Today's Date: 7/3/2025   Time Calculation  Start Time: 0928  Stop Time: 0941  Time Calculation (min): 13 min    Assessment/Plan   PT Assessment  Rehab Prognosis: Good  Barriers to Discharge Home: No anticipated barriers  Evaluation/Treatment Tolerance: Patient tolerated treatment well  Medical Staff Made Aware: Yes  Strengths: Attitude of self  Barriers to Participation: Comorbidities  End of Session Communication: Bedside nurse  Assessment Comment: pt presenting with chest discomfort and cough. Patient is admitted for c/f bilateral pneumonia complicated by right parapneumonic effusion. pt independent at baseline. grossly supervision-sba with mobility. Spo2 92% on RA. no acute PT needs at this time. encourage mobilizing with hospital staff while IP.  End of Session Patient Position: Bed, 3 rail up, Alarm on  IP OR SWING BED PT PLAN  Inpatient or Swing Bed: Inpatient  PT Plan  PT Plan: PT Eval only  PT Eval Only Reason: No acute PT needs identified  PT Frequency: PT eval only  PT Discharge Recommendations: No further acute PT, No PT needed after discharge  Equipment Recommended upon Discharge:  (NA)  PT Recommended Transfer Status: Independent  PT - OK to Discharge: Yes    Subjective     PT Visit Info:  PT Received On: 07/03/25  General Visit Information:  General  Reason for Referral: presenting with chest discomfort and cough. Patient is admitted for c/f bilateral pneumonia complicated by right parapneumonic effusion.  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 on 3L O2 (RA at baseline).  Home Living:  Home Living  Type of Home: House  Lives With: Adult children (dtrs)  Home Adaptive Equipment: Cane, Walker rolling or standard  Home Layout: Two level, Bed/bath upstairs, Stairs to alternate level with rails  Alternate Level Stairs-Rails: Right  Alternate Level Stairs-Number of Steps: 12  Home Access: Stairs to enter with rails  Entrance Stairs-Rails: Right  Entrance Stairs-Number of Steps: 7  Bathroom Shower/Tub: Tub/shower unit  Bathroom Toilet: Standard  Prior Level of Function:  Prior Function Per Pt/Caregiver Report  Level of [place]: Independent with ADLs and functional transfers, Needs assistance with homemaking  Receives Help From: Family  ADL Assistance: Independent  Homemaking Assistance: Independent  Ambulatory Assistance: Independent  Vocational: On disability  Prior Function Comments: - falls  Precautions:  Precautions  Medical Precautions: Fall precautions, Oxygen therapy device and L/min (3L)  Precautions Comment: droplet      Date/Time Vitals Session Patient Position Pulse Resp SpO2 BP MAP (mmHg)    07/03/25 0928 During PT  --  79  --  92 %  --  --                 Objective   Pain:  Pain Assessment  Pain Assessment: 0-10  0-10 (Numeric) Pain Score: 8  Pain Type: Chronic pain  Pain Location: Shoulder  Pain Orientation: Right, Left  Cognition:  Cognition  Overall Cognitive Status: Within Functional Limits  Orientation Level: Oriented X4    General Assessments:                  Activity Tolerance  Endurance: Tolerates 10 - 20 min exercise with multiple rests    Sensation  Light Touch: No apparent deficits            Perception  Inattention/Neglect: Appears intact  Initiation: Appears intact  Motor Planning: Appears intact  Perseveration: Not present      Coordination  Movements are Fluid and Coordinated: Yes    Postural Control  Postural Control: Within Functional Limits    Static Sitting  Balance  Static Sitting-Balance Support: Feet supported  Static Sitting-Level of Assistance: Independent  Dynamic Sitting Balance  Dynamic Sitting-Balance Support: Feet supported  Dynamic Sitting-Level of Assistance: Independent  Dynamic Sitting-Balance: Trunk control activities    Static Standing Balance  Static Standing-Balance Support: No upper extremity supported  Static Standing-Level of Assistance: Close supervision  Dynamic Standing Balance  Dynamic Standing-Balance Support: No upper extremity supported  Dynamic Standing-Level of Assistance: Close supervision  Dynamic Standing-Balance: Turning  Functional Assessments:  Bed Mobility  Bed Mobility: Yes  Bed Mobility 1  Bed Mobility 1: Supine to sitting, Sitting to supine  Level of Assistance 1: Distant supervision  Bed Mobility Comments 1: HOB elevated  Bed Mobility 2  Bed Mobility  2: Scooting  Level of Assistance 2: Independent  Bed Mobility Comments 2: boosted up in bed    Transfers  Transfer: Yes  Transfer 1  Transfer From 1: Sit to, Stand to  Transfer to 1: Sit, Stand  Technique 1: Sit to stand, Stand to sit  Transfer Device 1:  (none)  Transfer Level of Assistance 1: Close supervision  Trials/Comments 1: x1    Ambulation/Gait Training  Ambulation/Gait Training Performed: Yes  Ambulation/Gait Training 1  Surface 1: Level tile  Device 1: No device  Assistance 1: Close supervision  Quality of Gait 1: Narrow base of support  Comments/Distance (ft) 1: ~30ft    Stairs  Stairs: No  Extremity/Trunk Assessments:  RLE   RLE : Within Functional Limits  LLE   LLE : Within Functional Limits  Outcome Measures:  [facility] Basic Mobility  Turning from your back to your side while in a flat bed without using bedrails: None  Moving from lying on your back to sitting on the side of a flat bed without using bedrails: None  Moving to and from bed to chair (including a wheelchair): None  Standing up from a chair using your arms (e.g. wheelchair or bedside chair): None  To walk in  hospital room: A little  Climbing 3-5 steps with railing: A little  Basic Mobility - Total Score: 22        Education Documentation  Mobility Training, taught by Deborah Sierra PT at 7/3/2025 10:12 AM.  Learner: Patient  Readiness: Acceptance  Method: Explanation  Response: Verbalizes Understanding  Comment: benefits of sitting upright/OOB acitivites to promote pulm hygiene    Education Comments  No comments found.        07/03/25 at 10:13 AM - Deborah Sierra, PT

## 2025-07-03 NOTE — PROGRESS NOTES
Vancomycin Dosing by Pharmacy- FOLLOW UP    Manolo Bashir is a 69 y.o. year old male who Pharmacy has been consulted for vancomycin dosing for pneumonia. Based on the patient's indication and renal status this patient is being dosed based on a goal pre-HD level of 20-25.     Renal function: ESRD on iHD (TThSat). HD session on 7/3 AM      Visit Vitals  /60 (BP Location: Right arm, Patient Position: Lying)   Pulse 79   Temp 36.9 °C (98.4 °F) (Temporal)   Resp 16        Lab Results   Component Value Date    CREATININE 4.82 (H) 2025    CREATININE 8.01 (H) 2025    CREATININE 6.77 (H) 2025    CREATININE 6.33 (H) 2025        Patient weight is as follows:   Vitals:    25 1052   Weight: 68 kg (150 lb)       Cultures:  No results found for the encounter in last 14 days.       I/O last 3 completed shifts:  In: 2170 (31.9 mL/kg) [P.O.:120; I.V.:400 (5.9 mL/kg); Other:400; IV Piggyback:1250]  Out: 2400 (35.3 mL/kg) [Other:2400]  Weight: 68 kg   I/O during current shift:  No intake/output data recorded.    Temp (24hrs), Av.2 °C (98.9 °F), Min:36.6 °C (97.9 °F), Max:39.1 °C (102.3 °F)      Assessment/Plan    Will re-dose with 500 mg IV, after HD session on 7/3 AM.     The next level will be obtained on  at AM labs (pre-HD). May be obtained sooner if clinically indicated.   Will continue to monitor renal function daily while on vancomycin and order serum creatinine at least every 48 hours if not already ordered.  Follow for continued vancomycin needs, clinical response, and signs/symptoms of toxicity.       Chris Foster, PharmD

## 2025-07-03 NOTE — CONSULTS
Vancomycin Dosing by Pharmacy- INITIAL    Manolo Bashir is a 69 y.o. year old male who Pharmacy has been consulted for vancomycin dosing for pneumonia. Based on the patient's indication and renal status this patient will be dosed based on a goal pre-HD level of 20-25.     Renal function is currently stable.    Visit Vitals  /61 (BP Location: Left arm, Patient Position: Lying) Comment (BP Location): forearm   Pulse 85   Temp 37.1 °C (98.8 °F) (Temporal)   Resp 15        Lab Results   Component Value Date    CREATININE 6.77 (H) 2025    CREATININE 6.33 (H) 2025    CREATININE 8.50 (H) 2025    CREATININE 6.76 (H) 2025        Patient weight is as follows:   Vitals:    25 1052   Weight: 68 kg (150 lb)       Cultures:  No results found for the encounter in last 14 days.        I/O last 3 completed shifts:  In: 1200 (17.6 mL/kg) [IV Piggyback:1200]  Out: - (0 mL/kg)   Weight: 68 kg   I/O during current shift:  No intake/output data recorded.    Temp (24hrs), Av.6 °C (99.7 °F), Min:36.8 °C (98.2 °F), Max:39.1 °C (102.3 °F)         Assessment/Plan     Patient was given 2gm loading dose  Will initiate vancomycin maintenance, DBL    Follow-up level will be ordered on  at 1st am labs (before 2nd iHD session) unless clinically indicated sooner.  Will continue to monitor renal function daily while on vancomycin and order serum creatinine at least every 48 hours if not already ordered.  Follow for continued vancomycin needs, clinical response, and signs/symptoms of toxicity.       Sarahy Garcia, PharmD

## 2025-07-03 NOTE — PROGRESS NOTES
Manolo Bashir is a 69 y.o. male on day 1 of admission presenting with Chest discomfort, cough, and N/V. Patient is admitted for c/f bilatral pneumonia complicated by right parapneumonic effusion.    Subjective   NAEON. Patient received HD (net 1.43 L out) at 1 am. Patient is feeling well today with improved mental status. Extensive conversation about what led to patient being sick. Patient endorses that he was feeling well the day of dialysis (no abdominal pain, sweats, diaphoresis, chills, fever) with the exception of headache and also feeling well in the morning. Then suddenly he had N/V and his condition deteriorated rapidly, prompting  to bring him to the hospital.    Confirmed code status with the patient in the setting of him being encephalopathic on admission.    Discussed vaccines as a potential causes for exacerbation of HF. Patient is okay with pneumococcal vaccine but not flu vaccine.     Patient denies alcohol, alcohol, or IVDU use.        Objective     Last Recorded Vitals  Temp:  [36.6 °C (97.9 °F)-39.1 °C (102.3 °F)] 36.9 °C (98.4 °F)  Heart Rate:  [] 79  Resp:  [15-33] 16  BP: (110-177)/() 126/60  SpO2 Readings from Last 1 Encounters:   07/03/25 92%       Intake/Output last 3 Shifts:  I/O last 3 completed shifts:  In: 2170 (31.9 mL/kg) [P.O.:120; I.V.:400 (5.9 mL/kg); Other:400; IV Piggyback:1250]  Out: 2400 (35.3 mL/kg) [Other:2400]  Weight: 68 kg     Relevant Results    CMP  Results from last 72 hours   Lab Units 07/03/25  0624 07/02/25  2203 07/02/25  1216   SODIUM mmol/L 134* 130* 130*   POTASSIUM mmol/L 4.8 5.7* 5.7*   CHLORIDE mmol/L 99 95* 94*   BUN mg/dL 11 20 18   CREATININE mg/dL 4.82* 8.01* 6.77*   GLUCOSE mg/dL 140* 207* 188*   MAGNESIUM mg/dL  --   --  1.78   PHOSPHORUS mg/dL  --  3.7 3.8     CBC  Results from last 72 hours   Lab Units 07/03/25  0624 07/02/25  1216   WBC AUTO x10*3/uL 4.3* 4.7   HEMOGLOBIN g/dL 8.8* 10.6*   HEMATOCRIT % 28.5* 32.1*   MCV fL 92 84    PLATELETS AUTO x10*3/uL 121* 165      Liver Panel  Results from last 72 hours   Lab Units 07/03/25  0624 07/02/25  2203 07/02/25  1216   ALT U/L 60*  --  13   AST U/L 55*  --  25   ALK PHOS U/L 104  --  93   ALBUMIN g/dL 2.6* 2.6* 3.7      Other Labs  7/2 trop 50, 48  7/2 Lactate 1.8  7/2 Prolactin 1.01      Microbiology and Culture  Susceptibility data from last 120 days.  Collected Organism   05/25/25 1807 Staphylococcus hominis   05/20/25 2220 Staphylococcus hominis       Physical Exam  Physical Exam  Constitutional:       General: He seems well and not in acute distress  HENT:      Head: Normocephalic and atraumatic.      Mouth/Throat:      Mouth: Mucous membranes are dry.   Eyes:      General: No scleral icterus.     Extraocular Movements: Extraocular movements intact.   Cardiovascular:      Rate and Rhythm: Tachycardia present.      Heart sounds: No murmur heard.     No gallop (could not appreciate gallop despite volume status).   Pulmonary:      Effort: No respiratory distress.      Breath sounds: Rales present.      Comments: On 6L NC  Chest:      Chest wall: Tenderness present.   Abdominal:      General: There is distension.      Palpations: There is no mass.      Tenderness: There is abdominal tenderness. There is no right CVA tenderness, left CVA tenderness, guarding or rebound.      Hernia: No hernia is present.   Musculoskeletal:         General: Tenderness (Bilateral Shoulder Tenderness) present.      Cervical back: Limited range of motion in bilateral upper arm (R > L)     Comments: Left AVF for HD   Skin:     General: Skin is warm.      Capillary Refill: Capillary refill takes 2 to 3 seconds.      Coloration: Skin is pale. Skin is not jaundiced.      Findings: No bruising.      Comments: Patient was hot at the belly, normal towards lower extremities   Neurological:      Mental Status: He is currently not disoriented, AOx3     Motor: No weakness, was able to shake hands     Imaging  CT chest abdomen  pelvis w IV contrast  Result Date: 7/2/2025  1.  Worsening volume overload likely related to heart failure given worsening bilateral pleural effusions and dependent pulmonary edema/atelectasis and worsening anasarca. Recommend correlation with patient's fluid status an echocardiogram given the presence of moderate to severe cardiomegaly. 2. Worsening portal hypertension with enlarging portal/splenic veins and worsening hepatosplenomegaly as described above. Findings may be related to right-sided heart failure. 3. Large right shoulder fluid/soft tissue collection with associated cortical scalloping of adjacent osseous structures including the right anterior humerus, distal clavicle, and superior scapula compatible with subacute/chronic inflammatory process. This is better characterized on prior MR and remains worrisome for synovitis versus crystal arthropathy. 4. Dilated main pulmonary artery, which can be seen with pulmonary hypertension. 5. Remaining chronic and nonacute findings are described above.     I have reviewed the images/study and I agree with the findings as stated by Ian Dash MD (PGY-3).   MACRO: None   Signed by: Jj Cai 7/2/2025 4:45 PM Dictation workstation:   [id number]    XR chest 1 view  Result Date: 7/2/2025  1.  Bibasilar infiltrates. 2.  Mild vascular congestion. Signed by Amanuel Welsh MD    XR shoulder left 2+ views  Result Date: 6/26/2025  No acute abnormality seen     MACRO: None   Signed by: Brandon Eden 6/26/2025 12:35 PM Dictation workstation:   [id number]    CT chest wo IV contrast  Result Date: 6/23/2025  1. Improved and resolving faint ground-glass opacities remaining in the right upper, right lower and right middle lobes, overall favored to represent pulmonary alveolar edema. 2. Resolved left with interval decrease to small right pleural effusions. 3. Faint mosaic attenuation throughout the bilateral lungs can be seen in small airways/vessel disease. 4. Moderate  dilatation of the main pulmonary trunk measuring 3.5 cm can be seen in pulmonary hypertension. 5. Mild hypoattenuation of the blood pool in comparison to the adjacent myocardium can be seen in anemia. 6. Small right glenohumeral joint effusion with subacromial bursal fluid, likely degenerative. 7. Additional incidental non-acute findings as detailed above.     I personally reviewed the images/study and I agree with the findings as stated by Dr. Mitchell Da Silva. This study was interpreted at [facility], [place], [place].   MACRO: None.   Signed by: Emile Shah 6/23/2025 12:09 PM Dictation workstation:   [id number]    Vascular US upper extremity venous duplex right  Result Date: 6/7/2025  No sonographic evidence of venous thrombosis within the right upper extremity.   I personally reviewed the images/study and I agree with the findings as stated by Daljit Gould MD (Radiology Resident).   MACRO: None   Signed by: Gm Moore 6/7/2025 2:01 PM Dictation workstation:   [id number]    MR shoulder right w and wo IV contrast  Result Date: 6/4/2025  1. Displaced fracture of the coracoid process with smooth, scalloped fracture margins and no evidence of a marrow-replacing lesion, making a pathologic fracture unlikely. Mild underlying cortical scalloping is likely chronic and may relate to a longstanding large right shoulder joint effusion, which demonstrates heterogeneous T2 hypointense signal, most compatible with severe synovitis. This could be secondary to inflammatory or crystal arthropathy. Another diagnostic consideration of the patient is on hemodialysis would be amyloid arthropathy. 2. Complete retracted tearing of the supraspinatus and infraspinatus tendon. 3. Full-thickness tearing of the superior to mid subscapularis from its insertion. 4. Mild supraspinatus muscle atrophy with moderate edema involving the supraspinatus, infraspinatus, and subscapularis muscles. Additional  feathery edema pattern within the visualized shoulder musculature, suggestive of myositis or muscle strain. Recommend clinical correlation. 5. The long head biceps tendon is torn and retracted with superimposed tendinosis of the extra-articular long head biceps tendon. 6. Severe glenohumeral joint osteoarthrosis.     I personally reviewed the images/study and I agree with the findings as stated. This study was interpreted at [place], [place].   MACRO: None   Signed by: Don Larson 6/4/2025 9:11 AM Dictation workstation:   [id number]    XR chest 1 view  Result Date: 6/3/2025  Moderate pulmonary edema. Enlarged cardiac silhouette     MACRO: None   Signed by: Brandon Eden 6/3/2025 2:49 PM Dictation workstation:   [id number]    XR shoulder right 2+ views  Result Date: 6/3/2025  Subacute fracture through the coracoid process. Impaction fracture through the humeral head difficult to visualized radiographically. Inferior subluxation of the humeral head compatible with underlying large glenohumeral joint effusion.     MACRO: None   Signed by: Brandon Eden 6/3/2025 11:34 AM Dictation workstation:   [id number]    XR chest 2 views  Result Date: 6/3/2025  1.  No evidence of acute cardiopulmonary process.       MACRO: None   Signed by: Brandon Eden 6/3/2025 11:32 AM Dictation workstation:   [id number]       Inpatient Medications  Scheduled Medications[1]  Continuous Medications[2]  PRN Medications[3]             Assessment/Plan     Assessment: Manolo is a 70 y.o male with past medical history of stage 3 CKD/ESRD on HD TTS with stage 3 CKD s/p failed bilateral kidney transplant now on HD TTS , HFpEF, Metabolic syndrome, Monoclonal antibody of undetermined significance (MGUS) gastroparesis presenting with chest discomfort, cough, and N/V. Patient was found to be febrile, altered, bibasilar infiltrates on CXR, and signs of volume overload with cardiomegaly.  Concern  for infection, particularly pneumonia in the setting of pleural effusions, fever, AMS, and immunosuppression.  unable to obtain UA given anuric/oliguric ESRD, blood culture pending, COVID, flu, RSV negative, no history of diarrhea concerning for gastroenteritis, no evidence of intraabdominal infection on CT a/p. New O2 requirement of 6L with tachypnea.  patient was febrile and is immunosuppressed with new O2 requirement will continue treatment for PNA.     Updates 7/3  -> Received HD overnight (net 1430 out), planned HD today per his normal HD schedule of TTS  -> Pending 7/2 BCx and 7/3 sputum Cx, MRSA, PNA workup  -> started azithromycin for atypical coverage  -> Limited echo ordered on 7/3, pending completion  -> started Oxycodone 5mg prn for pain given resolution of AMS, continue tylenol prn for pain and fever  -> Call Daughter Purnima Bashir, [phone], to update on patient    Acute Hospital Problems:   ##Bilateral Pneumonia   Lactate wnl, s/p 500 ml bolus, bolus with caution given fluid overload. Procal elevated at 1.01 with negative flu and covid. No other source of infection suspected given no urological source (anuric), abdominal (negative enteritis on CT, negative abd fluid for SBP on imaging, negative abd symptoms), no extremity infection (no limb injury or necrosis seen on exam, AVF intact and functional). Possible cranial infection but not likely   -> Pending Bcx (7/2), MRSA, sputum culture  -> Day 2/7 abx: Currently on zosyn q8 and vanc on 7/2 and azithro on 7/3   ->Discontinue Vanc if mrsa negative  ->Received singular dose of zosyn and vanc in ED (7/2)  ->Tylenol prn for pain and fever   ##C/B Acute Hypoxic Respiratory Failure 2/2 suspected Right parapneumonic effusions vs bilateral pleural effusions  Does not require oxygen at baseline  -> Currently on 3L NC and feeling well, attempt to wean to BL  ##C/B Acute encephalopathy, Resolved  At baseline patient is Aox3 and can ambulate without help.  -> Okay  to eat per nurse bedside swallow on 7/2  -> Patient Aox3 on 7/3     ##Volume Overload 2/2 ESRD vs decompensated HFpEF  Volume exam demonstrates 1+ LE edema (laying down), abdominal distension with positive fluid waves and dullness on percussion. CXR (7/2) shows bibasilar infiltrates, mild vascular congestion. CTAP w/ contrast shows worsening pleural effusions, dependent pulmonary edema, atelectasis, and anasarca. Worsening portal HTN with enlarging portal/splenic veins and hepatosplenomegaly. Dilated main pulmonary artery. Findings were compared to previous images  ##Stage 3 CKD/ESRD s/p bilateral failed kidney transplant on HD TTS via Left AVF  Not a current transplant candidate per 7/24 committee note due to multiple hospitalizations. Both transplanted kidney are not functional however patient is currently on tacrolimus 0.5 mg daily and prednisone 5mg daily. Patient is anuric at baseline and dependent on HD for fluid removal. Currently his Cr is 6.77 with a baseline range of 5-9, estimated CrCl is 10 ml/min.    -> Transplant Nephrology Consulted              -> Tacrolimus Held, get morning trough levels, continue prednisone  -> Dialysis Nephrology Consulted              -> iHD dialysis on 7/2 evening (net 1.43 fl out)  ##Decompensated Heart failure with preserved Ejection Fraction  Chronic HFpEF with recent echo on 1/25 showing EF of 55%. Concern for decompensation due to cardiomegaly on CTAP. BNP 1.7K on 7/2, last value 2.5K on 4/25.  -> Ordered limited echo on 7/3  ##C/B Electrolyte Abnormalities, Resolved  -> Hyperkalemia s/p HyperK cocktail, insulin, and albuterol in ED, 5.7 on admission now 4.8 on 7/3  -> Hyponatremia, 130 on admission now 134 on 7/3     ##Hypertension  Admitted with systolic in 200  -> Continue home regimen: Coreg 37.5 BID, isosorbid mononitrate 30 mg daily, Nifedipine 90 mg BID   -> Monitor systolic >160 or HR <60    ##Pancytopenia  -> Possible hemoconcentration on 7/2 CBC given significant  drop on 7/3 CBC labs s/p 500 ml bolus.  ->Monitor platelets     ##Disposition  -> Social work Consulted  -> PT Consulted  -> OT Consulted       Chronic Medical Problems:       ##T2DM  -> Glargine long acting and SSI     ##MSK Pain, Shoulder R >L   Shoulder pain managed with opioids. ED gave 0.5mg dilaudid  -> Held Norco and Lyrica and stopped percocet in the setting of somnolence   -> Resume oxy 5, flexeril, and lyrica once somnolence is r/o  -> Continue Lidocaine patch for both shoulders     ##Gastroparesis  -> continued reglan 5 mg Tid with meals  -> Zofran Prn held, pending ECG read for normal QT     ##GERD  -> continue home protonix 40 mg daily     ##HLD  -> Continue Home Pravastatin       Fluids: Replete PRN  Electrolytes: Replete PRN  Nutrition: Adult diet Renal; Potassium Restricted 2 gm (50mEq); 2 - 3 grams Sodium  Antimicrobials: azithromycin - 500 mg  piperacillin-tazobactam - 2.25 gram/50 mL  vancomycin  DVT PPX: Heparin subcutaneous   Bowel Regimen: Miralax PRN  LDA: Right Forearm PIV   Oxygen: 3L NC (BL is no oxygen requirements)  Disposition: Home, Pending SW  Code Status: Full Code  NOK: BEN is daughter Purnima Bashir, [phone]  Secondary contact is  Amalia Enriquez, [phone]      Aamir Nuñez MD  Internal Medicine PGY-1    Patient discussed with attending physician, Debbie Brown MD, who agrees with plan.            [1] azithromycin, 500 mg, oral, Daily  carvedilol, 37.5 mg, oral, BID  heparin (porcine), 5,000 Units, subcutaneous, q8h  insulin glargine, 10 Units, subcutaneous, q24h  insulin lispro, 0-10 Units, subcutaneous, Before meals & nightly  isosorbide mononitrate ER, 30 mg, oral, Daily  lidocaine, 2 patch, transdermal, Daily  metoclopramide, 5 mg, oral, TID AC  NIFEdipine ER, 90 mg, oral, BID  pantoprazole, 40 mg, oral, Daily before breakfast  piperacillin-tazobactam, 2.25 g, intravenous, q8h  pravastatin, 10 mg, oral, Nightly  predniSONE, 5 mg, oral, Daily  pregabalin, 25 mg,  oral, Nightly  vitamin B complex-vitamin C-folic acid, 1 capsule, oral, Daily     [2]    [3] PRN medications: acetaminophen **OR** acetaminophen, cyclobenzaprine, dextrose, dextrose, glucagon, glucagon, ondansetron, oxyCODONE, polyethylene glycol, sodium chloride, vancomycin

## 2025-07-03 NOTE — CARE PLAN
The clinical goals for the shift include Pt pain will be controlled throughout shift      Problem: Pain - Adult  Goal: Verbalizes/displays adequate comfort level or baseline comfort level  Outcome: Progressing     Problem: Safety - Adult  Goal: Free from fall injury  Outcome: Progressing     Problem: Discharge Planning  Goal: Discharge to home or other facility with appropriate resources  Outcome: Progressing     Problem: Chronic Conditions and Co-morbidities  Goal: Patient's chronic conditions and co-morbidity symptoms are monitored and maintained or improved  Outcome: Progressing     Problem: Nutrition  Goal: Nutrient intake appropriate for maintaining nutritional needs  Outcome: Progressing     Problem: Skin  Goal: Decreased wound size/increased tissue granulation at next dressing change  Outcome: Progressing  Flowsheets (Taken 7/3/2025 1541)  Decreased wound size/increased tissue granulation at next dressing change:   Promote sleep for wound healing   Protective dressings over bony prominences  Goal: Participates in plan/prevention/treatment measures  Outcome: Progressing  Flowsheets (Taken 7/3/2025 1541)  Participates in plan/prevention/treatment measures:   Discuss with provider PT/OT consult   Elevate heels   Increase activity/out of bed for meals  Goal: Prevent/manage excess moisture  Outcome: Progressing  Flowsheets (Taken 7/3/2025 1541)  Prevent/manage excess moisture:   Cleanse incontinence/protect with barrier cream   Moisturize dry skin   Follow provider orders for dressing changes   Monitor for/manage infection if present  Goal: Prevent/minimize sheer/friction injuries  Outcome: Progressing  Flowsheets (Taken 7/3/2025 1541)  Prevent/minimize sheer/friction injuries:   Complete micro-shifts as needed if patient unable. Adjust patient position to relieve pressure points, not a full turn   Increase activity/out of bed for meals   Use pull sheet   HOB 30 degrees or less   Turn/reposition every 2 hours/use  positioning/transfer devices  Goal: Promote/optimize nutrition  Outcome: Progressing  Flowsheets (Taken 7/3/2025 1541)  Promote/optimize nutrition:   Assist with feeding   Monitor/record intake including meals   Consume > 50% meals/supplements   Offer water/supplements/favorite foods   Discuss with provider if NPO > 2 days  Goal: Promote skin healing  Outcome: Progressing  Flowsheets (Taken 7/3/2025 1541)  Promote skin healing:   Assess skin/pad under line(s)/device(s)   Protective dressings over bony prominences   Turn/reposition every 2 hours/use positioning/transfer devices   Rotate device position/do not position patient on device

## 2025-07-03 NOTE — PROGRESS NOTES
07/03/25 1241   Rapid Rounds   Attendance Provider;Care Transitions   Expected Discharge Disposition Home   Today we still await: Clinical stability   Review at Escalation Rounds No escalation needed       Patient is being treated for pneumonia. Anticipating discharge in 1-2 days.      Kristen Omalley RN, BSN  Transitional Care Coordinator

## 2025-07-03 NOTE — CONSULTS
"Nutrition Initial Assessment:   Nutrition Assessment    Reason for Assessment: Admission nursing screening    Patient is a 69 y.o. male admitted for c/f bilateral pneumonia complicated by right parapneumonic effusion.   Medical History[1]      Patient was assessed virtually.    Nutrition History:  Food and Nutrient History: Spoke with pt on the phone. Pt reports his appetite is doing alright. States he doesn't like the hospital food because it doesn't have any flavor. Reports he's been eating salt his whole life. This RD explained pt is on a reduced salt diet for his kidney function; reviewed using Mrs. Castillo or sauces as an option to add extra flavor during admission. Pt agreeable to receive Nepro BID to aid in meeting nutrition needs while appetite is otherwise decreased.       Anthropometrics:  Height: 172.7 cm (5' 8\")   Weight: 68 kg (150 lb)   BMI (Calculated): 22.81  IBW/kg (Dietitian Calculated): 70 kg  Percent of IBW: 97 %                      Weight History:   Wt Readings from Last 20 Encounters:   07/02/25 68 kg (150 lb)   06/21/25 68 kg (150 lb)   06/20/25 68 kg (150 lb)   06/03/25 68 kg (149 lb 14.6 oz)   06/02/25 71.2 kg (157 lb)   05/25/25 65 kg (143 lb 4.8 oz)   05/21/25 70.3 kg (155 lb)   05/14/25 70.2 kg (154 lb 12.8 oz)   05/14/25 68.5 kg (151 lb)   04/30/25 66.6 kg (146 lb 12.8 oz)   04/23/25 68.9 kg (152 lb)   04/14/25 68.9 kg (151 lb 14.4 oz)   03/31/25 68.9 kg (152 lb)   03/25/25 73.2 kg (161 lb 6 oz)   02/27/25 68 kg (150 lb)   02/24/25 70.1 kg (154 lb 9.6 oz)   02/24/25 70.1 kg (154 lb 9.6 oz)   02/04/25 73.3 kg (161 lb 9.6 oz)   01/05/25 71.7 kg (158 lb)   12/16/24 72.8 kg (160 lb 8 oz)     07/03/24 70.4 kg (155 lb 3.2 oz)       Weight Change %:  Weight History / % Weight Change: Stated weight only this admission. Per chart review, pt with weight fluctuations over past year; likely fluid related from dialysis. Weight starting to trend back up.    Nutrition Focused Physical Exam " Findings:    Subcutaneous Fat Loss:   Defer Subcutaneous Fat Loss Assessment: Defer all  Defer All Reason: remote assessment  Muscle Wasting:  Defer Muscle Wasting Assessment: Defer all  Defer All Reason: remote assessment  Edema:  Edema: none  Physical Findings:  Skin: Negative    Nutrition Significant Labs:  BMP Trend:   Results from last 7 days   Lab Units 07/03/25  0624 07/02/25 2203 07/02/25  1216   GLUCOSE mg/dL 140* 207* 188*   CALCIUM mg/dL 9.0 9.2 10.5   SODIUM mmol/L 134* 130* 130*   POTASSIUM mmol/L 4.8 5.7* 5.7*   CO2 mmol/L 29 30 27   CHLORIDE mmol/L 99 95* 94*   BUN mg/dL 11 20 18   CREATININE mg/dL 4.82* 8.01* 6.77*    , A1C:  Lab Results   Component Value Date    HGBA1C 8.5 (A) 05/14/2025   , Renal Lab Trend:   Results from last 7 days   Lab Units 07/03/25  0624 07/02/25 2203 07/02/25  1216   POTASSIUM mmol/L 4.8 5.7* 5.7*   PHOSPHORUS mg/dL  --  3.7 3.8   SODIUM mmol/L 134* 130* 130*   MAGNESIUM mg/dL  --   --  1.78   EGFR mL/min/1.73m*2 12* 7* 8*   BUN mg/dL 11 20 18   CREATININE mg/dL 4.82* 8.01* 6.77*    , Vit D:   Lab Results   Component Value Date    VITD25 21 (L) 04/25/2025        Nutrition Specific Medications:  Scheduled medications  Scheduled Medications[2]  Continuous medications  Continuous Medications[3]  PRN medications  PRN Medications[4]      I/O:   Last BM Date: 07/01/25 (Per patient);      Dietary Orders (From admission, onward)       Start     Ordered    07/02/25 2051  May Participate in Room Service With Assistance  ( ROOM SERVICE MAY PARTICIPATE WITH ASSISTANCE)  Once        Question:  .  Answer:  Yes    07/02/25 2050 07/02/25 2018  Adult diet Renal; Potassium Restricted 2 gm (50mEq); 2 - 3 grams Sodium  Diet effective now        Question Answer Comment   Diet type Renal    Potassium restriction: Potassium Restricted 2 gm (50mEq)    Sodium restriction: 2 - 3 grams Sodium        07/02/25 2018                     Estimated Needs:   Total Energy Estimated Needs in 24 hours  (kCal): 2040 kCal  Method for Estimating Needs: 30+ kcal/kg CBW (68 kg)  Total Protein Estimated Needs in 24 Hours (g): 82 g  Method for Estimating 24 Hour Protein Needs: 1.2+ g/kg CBW (68 kg)  Total Fluid Estimated Needs in 24 Hours (mL): 2040 mL  Method for Estimating 24 Hour Fluid Needs: 1 ml/kcal or per provider        Nutrition Diagnosis   Malnutrition Diagnosis  Patient has Malnutrition Diagnosis: No    Nutrition Diagnosis  Patient has Nutrition Diagnosis: Yes  Diagnosis Status (1): New  Nutrition Diagnosis 1: Increased nutrient needs  Related to (1): increased metabolic demand  As Evidenced by (1): ESRD on HD       Nutrition Interventions/Recommendations   Nutrition prescription for oral nutrition    Nutrition Recommendations:  Individualized Nutrition Prescription Provided for : Continue renal diet as ordered. Trial Nepro BID.    Nutrition Interventions/Goals:   Meals and Snacks: Mineral-modified diet  Goal: Pt to tolerate renal diet  Medical Food Supplement: Commercial beverage medical food supplement therapy  Goal: Nepro to provide 420 kcal, 19 g protein each.      Education Documentation  No documentation found.            Nutrition Monitoring and Evaluation   Intake / Amount of food: Consumes at least 50% or more of meals/snacks/supplements    Body Weight: Body weight - Maintain stable weight              Goal Status: New goal(s) identified    Time Spent (min): 60 minutes         07/03/25 at 12:14 PM - ABRAHAM CHAMBERLAIN RDN, SPI[place]         [1]   Past Medical History:  Diagnosis Date    Anemia     Arthritis     Cataract     Chronic kidney disease, stage 3 unspecified (Multi) 09/26/2018    Stage 3 chronic kidney disease    CKD (chronic kidney disease)     stage V    Cough 02/12/2024    COVID-19 06/18/2020    COVID-19 virus infection    Diabetes (Multi)     ESRD (end stage renal disease) (Multi)     Focal and segmental proliferative glomerulonephritis 12/23/2023    HTN (hypertension)     Hyperlipidemia     Other  long term (current) drug therapy 07/20/2021    High risk medication use    Personal history of other diseases of the circulatory system     Personal history of cardiac murmur    Personal history of other infectious and parasitic diseases 08/17/2015    History of hepatitis    Polyp, colonic 08/17/2023    Primary osteoarthritis of both ankles 08/17/2023    Prostate cancer (Multi)     Tubular adenoma of colon 08/17/2023    Unspecified kidney failure 08/17/2016    Renal failure   [2] azithromycin, 500 mg, oral, Daily  carvedilol, 37.5 mg, oral, BID  heparin (porcine), 5,000 Units, subcutaneous, q8h  insulin glargine, 10 Units, subcutaneous, q24h  insulin lispro, 0-10 Units, subcutaneous, Before meals & nightly  isosorbide mononitrate ER, 30 mg, oral, Daily  lidocaine, 2 patch, transdermal, Daily  metoclopramide, 5 mg, oral, TID AC  NIFEdipine ER, 90 mg, oral, BID  pantoprazole, 40 mg, oral, Daily before breakfast  piperacillin-tazobactam, 2.25 g, intravenous, q8h  pravastatin, 10 mg, oral, Nightly  predniSONE, 5 mg, oral, Daily  pregabalin, 25 mg, oral, Nightly  vitamin B complex-vitamin C-folic acid, 1 capsule, oral, Daily  [3]    [4] PRN medications: acetaminophen **OR** acetaminophen, cyclobenzaprine, dextrose, dextrose, glucagon, glucagon, ondansetron, oxyCODONE, polyethylene glycol, sodium chloride, vancomycin

## 2025-07-04 ENCOUNTER — APPOINTMENT (OUTPATIENT)
Dept: DIALYSIS | Facility: HOSPITAL | Age: 70
End: 2025-07-04
Payer: COMMERCIAL

## 2025-07-04 DIAGNOSIS — R91.8 GROUND GLASS OPACITY PRESENT ON IMAGING OF LUNG: ICD-10-CM

## 2025-07-04 LAB
ERYTHROCYTE [DISTWIDTH] IN BLOOD BY AUTOMATED COUNT: 15 % (ref 11.5–14.5)
GLUCOSE BLD MANUAL STRIP-MCNC: 148 MG/DL (ref 74–99)
GLUCOSE BLD MANUAL STRIP-MCNC: 269 MG/DL (ref 74–99)
GLUCOSE BLD MANUAL STRIP-MCNC: 282 MG/DL (ref 74–99)
HCT VFR BLD AUTO: 27.2 % (ref 41–52)
HGB BLD-MCNC: 8.6 G/DL (ref 13.5–17.5)
MCH RBC QN AUTO: 27.7 PG (ref 26–34)
MCHC RBC AUTO-ENTMCNC: 31.6 G/DL (ref 32–36)
MCV RBC AUTO: 88 FL (ref 80–100)
NRBC BLD-RTO: 0 /100 WBCS (ref 0–0)
PLATELET # BLD AUTO: 140 X10*3/UL (ref 150–450)
RBC # BLD AUTO: 3.1 X10*6/UL (ref 4.5–5.9)
STAPHYLOCOCCUS SPEC CULT: NORMAL
WBC # BLD AUTO: 3.8 X10*3/UL (ref 4.4–11.3)

## 2025-07-04 PROCEDURE — 90935 HEMODIALYSIS ONE EVALUATION: CPT | Performed by: INTERNAL MEDICINE

## 2025-07-04 PROCEDURE — 2500000004 HC RX 250 GENERAL PHARMACY W/ HCPCS (ALT 636 FOR OP/ED)

## 2025-07-04 PROCEDURE — 8010000001 HC DIALYSIS - HEMODIALYSIS PER DAY

## 2025-07-04 PROCEDURE — 99232 SBSQ HOSP IP/OBS MODERATE 35: CPT

## 2025-07-04 PROCEDURE — 2500000001 HC RX 250 WO HCPCS SELF ADMINISTERED DRUGS (ALT 637 FOR MEDICARE OP)

## 2025-07-04 PROCEDURE — 99223 1ST HOSP IP/OBS HIGH 75: CPT | Performed by: INTERNAL MEDICINE

## 2025-07-04 PROCEDURE — 36415 COLL VENOUS BLD VENIPUNCTURE: CPT

## 2025-07-04 PROCEDURE — 1200000002 HC GENERAL ROOM WITH TELEMETRY DAILY

## 2025-07-04 PROCEDURE — 2500000005 HC RX 250 GENERAL PHARMACY W/O HCPCS

## 2025-07-04 PROCEDURE — 2500000002 HC RX 250 W HCPCS SELF ADMINISTERED DRUGS (ALT 637 FOR MEDICARE OP, ALT 636 FOR OP/ED)

## 2025-07-04 PROCEDURE — 82947 ASSAY GLUCOSE BLOOD QUANT: CPT

## 2025-07-04 PROCEDURE — 85027 COMPLETE CBC AUTOMATED: CPT

## 2025-07-04 RX ORDER — ACETAMINOPHEN 160 MG/5ML
650 SOLUTION ORAL EVERY 4 HOURS PRN
Status: DISCONTINUED | OUTPATIENT
Start: 2025-07-04 | End: 2025-07-05 | Stop reason: HOSPADM

## 2025-07-04 RX ORDER — INSULIN GLARGINE 100 [IU]/ML
20 INJECTION, SOLUTION SUBCUTANEOUS EVERY 24 HOURS
Status: DISCONTINUED | OUTPATIENT
Start: 2025-07-05 | End: 2025-07-05 | Stop reason: HOSPADM

## 2025-07-04 RX ORDER — HYDRALAZINE HYDROCHLORIDE 10 MG/1
10 TABLET, FILM COATED ORAL ONCE
Status: COMPLETED | OUTPATIENT
Start: 2025-07-04 | End: 2025-07-04

## 2025-07-04 RX ORDER — ACETAMINOPHEN 325 MG/1
650 TABLET ORAL EVERY 4 HOURS PRN
Status: DISCONTINUED | OUTPATIENT
Start: 2025-07-04 | End: 2025-07-05 | Stop reason: HOSPADM

## 2025-07-04 RX ADMIN — PANTOPRAZOLE SODIUM 40 MG: 40 TABLET, DELAYED RELEASE ORAL at 06:10

## 2025-07-04 RX ADMIN — ASCORBIC ACID, THIAMINE MONONITRATE,RIBOFLAVIN, NIACINAMIDE, PYRIDOXINE HYDROCHLORIDE, FOLIC ACID, CYANOCOBALAMIN, BIOTIN, CALCIUM PANTOTHENATE, 1 CAPSULE: 100; 1.5; 1.7; 20; 10; 1; 6000; 150000; 5 CAPSULE, LIQUID FILLED ORAL at 11:10

## 2025-07-04 RX ADMIN — PREGABALIN 25 MG: 25 CAPSULE ORAL at 22:04

## 2025-07-04 RX ADMIN — METOCLOPRAMIDE 5 MG: 5 TABLET ORAL at 16:48

## 2025-07-04 RX ADMIN — HEPARIN SODIUM 5000 UNITS: 5000 INJECTION, SOLUTION INTRAVENOUS; SUBCUTANEOUS at 22:05

## 2025-07-04 RX ADMIN — PREDNISONE 5 MG: 5 TABLET ORAL at 11:10

## 2025-07-04 RX ADMIN — CARVEDILOL 37.5 MG: 6.25 TABLET, FILM COATED ORAL at 22:04

## 2025-07-04 RX ADMIN — NIFEDIPINE 90 MG: 90 TABLET, FILM COATED, EXTENDED RELEASE ORAL at 22:05

## 2025-07-04 RX ADMIN — INSULIN LISPRO 6 UNITS: 100 INJECTION, SOLUTION INTRAVENOUS; SUBCUTANEOUS at 22:04

## 2025-07-04 RX ADMIN — OXYCODONE HYDROCHLORIDE 5 MG: 5 TABLET ORAL at 12:42

## 2025-07-04 RX ADMIN — CYCLOBENZAPRINE 5 MG: 10 TABLET, FILM COATED ORAL at 11:11

## 2025-07-04 RX ADMIN — ISOSORBIDE MONONITRATE 30 MG: 30 TABLET, EXTENDED RELEASE ORAL at 11:12

## 2025-07-04 RX ADMIN — PRAVASTATIN SODIUM 10 MG: 20 TABLET ORAL at 22:05

## 2025-07-04 RX ADMIN — PIPERACILLIN SODIUM AND TAZOBACTAM SODIUM 2.25 G: 2; .25 INJECTION, SOLUTION INTRAVENOUS at 22:04

## 2025-07-04 RX ADMIN — HEPARIN SODIUM 5000 UNITS: 5000 INJECTION, SOLUTION INTRAVENOUS; SUBCUTANEOUS at 11:10

## 2025-07-04 RX ADMIN — LIDOCAINE 4% 2 PATCH: 40 PATCH TOPICAL at 11:10

## 2025-07-04 RX ADMIN — PIPERACILLIN SODIUM AND TAZOBACTAM SODIUM 2.25 G: 2; .25 INJECTION, SOLUTION INTRAVENOUS at 12:42

## 2025-07-04 RX ADMIN — CARVEDILOL 37.5 MG: 6.25 TABLET, FILM COATED ORAL at 11:10

## 2025-07-04 RX ADMIN — INSULIN LISPRO 6 UNITS: 100 INJECTION, SOLUTION INTRAVENOUS; SUBCUTANEOUS at 17:02

## 2025-07-04 RX ADMIN — HEPARIN SODIUM 5000 UNITS: 5000 INJECTION, SOLUTION INTRAVENOUS; SUBCUTANEOUS at 02:43

## 2025-07-04 RX ADMIN — AZITHROMYCIN DIHYDRATE 500 MG: 500 TABLET ORAL at 11:10

## 2025-07-04 RX ADMIN — ACETAMINOPHEN 650 MG: 325 TABLET ORAL at 16:48

## 2025-07-04 RX ADMIN — METOCLOPRAMIDE 5 MG: 5 TABLET ORAL at 11:10

## 2025-07-04 RX ADMIN — HYDRALAZINE HYDROCHLORIDE 10 MG: 10 TABLET ORAL at 17:16

## 2025-07-04 RX ADMIN — NIFEDIPINE 90 MG: 90 TABLET, FILM COATED, EXTENDED RELEASE ORAL at 11:13

## 2025-07-04 RX ADMIN — PIPERACILLIN SODIUM AND TAZOBACTAM SODIUM 2.25 G: 2; .25 INJECTION, SOLUTION INTRAVENOUS at 03:06

## 2025-07-04 RX ADMIN — OXYCODONE HYDROCHLORIDE 5 MG: 5 TABLET ORAL at 06:10

## 2025-07-04 ASSESSMENT — PAIN - FUNCTIONAL ASSESSMENT
PAIN_FUNCTIONAL_ASSESSMENT: 0-10
PAIN_FUNCTIONAL_ASSESSMENT: NO/DENIES PAIN
PAIN_FUNCTIONAL_ASSESSMENT: 0-10
PAIN_FUNCTIONAL_ASSESSMENT: 0-10

## 2025-07-04 ASSESSMENT — PAIN DESCRIPTION - DESCRIPTORS
DESCRIPTORS: ACHING

## 2025-07-04 ASSESSMENT — PAIN SCALES - GENERAL
PAINLEVEL_OUTOF10: 6
PAINLEVEL_OUTOF10: 1
PAINLEVEL_OUTOF10: 0 - NO PAIN
PAINLEVEL_OUTOF10: 4
PAINLEVEL_OUTOF10: 9
PAINLEVEL_OUTOF10: 7
PAINLEVEL_OUTOF10: 7

## 2025-07-04 ASSESSMENT — PAIN DESCRIPTION - LOCATION: LOCATION: SHOULDER

## 2025-07-04 ASSESSMENT — COGNITIVE AND FUNCTIONAL STATUS - GENERAL
WALKING IN HOSPITAL ROOM: A LITTLE
CLIMB 3 TO 5 STEPS WITH RAILING: A LITTLE
MOBILITY SCORE: 21
STANDING UP FROM CHAIR USING ARMS: A LITTLE
DAILY ACTIVITIY SCORE: 23
TOILETING: A LITTLE

## 2025-07-04 ASSESSMENT — PAIN DESCRIPTION - ORIENTATION: ORIENTATION: RIGHT

## 2025-07-04 NOTE — CARE PLAN
The patient's goals for the shift include Patient will remain safe and free from fall and injury during shift    The clinical goals for the shift include Patient will remain HDS during shift    Other goals include:  Problem: Safety - Adult  Goal: Free from fall injury  7/4/2025 1434 by Elvira Knox RN  Outcome: Progressing  7/4/2025 1434 by Elvira Knox RN  Outcome: Progressing     Problem: Chronic Conditions and Co-morbidities  Goal: Patient's chronic conditions and co-morbidity symptoms are monitored and maintained or improved  7/4/2025 1434 by Elvira Knox RN  Outcome: Progressing  7/4/2025 1434 by Elvira Knox RN  Outcome: Progressing     Problem: Nutrition  Goal: Nutrient intake appropriate for maintaining nutritional needs  7/4/2025 1434 by Elvira Knox RN  Outcome: Progressing  7/4/2025 1434 by Elvira Knox RN  Outcome: Progressing     Problem: Skin  Goal: Decreased wound size/increased tissue granulation at next dressing change  7/4/2025 1434 by Elvira Knox RN  Outcome: Progressing  7/4/2025 1434 by Elvira Knox RN  Outcome: Progressing  Goal: Participates in plan/prevention/treatment measures  7/4/2025 1434 by Elvira Knox RN  Outcome: Progressing  7/4/2025 1434 by Elvira Knox RN  Outcome: Progressing  Goal: Prevent/manage excess moisture  7/4/2025 1434 by Elvira Knox RN  Outcome: Progressing  7/4/2025 1434 by Elvira Knox RN  Outcome: Progressing  Goal: Prevent/minimize sheer/friction injuries  7/4/2025 1434 by Elvira Knox RN  Outcome: Progressing  7/4/2025 1434 by Elvira Knox RN  Outcome: Progressing  Goal: Promote/optimize nutrition  7/4/2025 1434 by Elvira Knox RN  Outcome: Progressing  7/4/2025 1434 by Elvira Knox RN  Outcome: Progressing  Goal: Promote skin healing  7/4/2025 1434 by Elvira Knox RN  Outcome: Progressing  7/4/2025 1434 by Elvira Knox RN  Outcome: Progressing     Problem: Diabetes  Goal: Achieve decreasing blood glucose levels by end of  shift  7/4/2025 1434 by Elvira Knox RN  Outcome: Progressing  7/4/2025 1434 by Elvira Knox RN  Outcome: Progressing  Goal: Increase stability of blood glucose readings by end of shift  7/4/2025 1434 by Elvira Knox RN  Outcome: Progressing  7/4/2025 1434 by Elvira Knox RN  Outcome: Progressing  Goal: Decrease in ketones present in urine by end of shift  7/4/2025 1434 by Elvira Knox RN  Outcome: Progressing  7/4/2025 1434 by Elvira Knox RN  Outcome: Progressing  Goal: Maintain electrolyte levels within acceptable range throughout shift  7/4/2025 1434 by Elvira Knox RN  Outcome: Progressing  7/4/2025 1434 by Elvira Knox RN  Outcome: Progressing  Goal: Maintain glucose levels >70mg/dl to <250mg/dl throughout shift  7/4/2025 1434 by Elvira Knox RN  Outcome: Progressing  7/4/2025 1434 by Elvira Knox RN  Outcome: Progressing  Goal: No changes in neurological exam by end of shift  7/4/2025 1434 by Elvira Knox RN  Outcome: Progressing  7/4/2025 1434 by Elvira Knox RN  Outcome: Progressing  Goal: Learn about and adhere to nutrition recommendations by end of shift  7/4/2025 1434 by Elvira Knox RN  Outcome: Progressing  7/4/2025 1434 by Elvira Knox RN  Outcome: Progressing  Goal: Vital signs within normal range for age by end of shift  7/4/2025 1434 by Elvira Knox RN  Outcome: Progressing  7/4/2025 1434 by Elvira Knox RN  Outcome: Progressing  Goal: Increase self care and/or family involovement by end of shift  7/4/2025 1434 by Elvira Knox RN  Outcome: Progressing  7/4/2025 1434 by Elvira Knox RN  Outcome: Progressing  Goal: Receive DSME education by end of shift  7/4/2025 1434 by Elvira Knox RN  Outcome: Progressing  7/4/2025 1434 by Elvira Knox RN  Outcome: Progressing     Problem: Pain  Goal: Takes deep breaths with improved pain control throughout the shift  7/4/2025 1434 by Elvira Lisette, RN  Outcome: Progressing  7/4/2025 1434 by Elvira Knox, RN  Outcome:  Progressing  Goal: Turns in bed with improved pain control throughout the shift  7/4/2025 1434 by Elvira Knox RN  Outcome: Progressing  7/4/2025 1434 by Elvira Knox RN  Outcome: Progressing  Goal: Walks with improved pain control throughout the shift  7/4/2025 1434 by Elvira Knox RN  Outcome: Progressing  7/4/2025 1434 by Elvira Knox RN  Outcome: Progressing  Goal: Performs ADL's with improved pain control throughout shift  7/4/2025 1434 by Elvira Knox RN  Outcome: Progressing  7/4/2025 1434 by Elvira Knox RN  Outcome: Progressing  Goal: Participates in PT with improved pain control throughout the shift  7/4/2025 1434 by Elvira Knox RN  Outcome: Progressing  7/4/2025 1434 by Elvira Knox RN  Outcome: Progressing  Goal: Free from opioid side effects throughout the shift  7/4/2025 1434 by Elvira Knox RN  Outcome: Progressing  7/4/2025 1434 by Elvira Knox RN  Outcome: Progressing  Goal: Free from acute confusion related to pain meds throughout the shift  7/4/2025 1434 by Elvira Knox RN  Outcome: Progressing  7/4/2025 1434 by Elvira Knox RN  Outcome: Progressing

## 2025-07-04 NOTE — NURSING NOTE
Report from Sending RN:    Report From: Farzana  Recent Surgery of Procedure: No  Baseline Level of Consciousness (LOC): A and O x  4  Oxygen Use: No  Type: RA  Diabetic: Yes  Last BP Med Given Day of Dialysis: 132/55 BP at 4 AM  Last Pain Med Given: None today  Lab Tests to be Obtained with Dialysis: No  Blood Transfusion to be Given During Dialysis: No  Available IV Access: Yes  Medications to be Administered During Dialysis: No  Continuous IV Infusion Running: No  Restraints on Currently or in the Last 24 Hours: No  Hand-Off Communication: Pt is doing much better, up an about in his room.  Dialysis Catheter Dressing: AVF  Last Dressing Change: NA

## 2025-07-04 NOTE — PROGRESS NOTES
Vancomycin Dosing by Pharmacy- Cessation of Therapy    Consult to pharmacy for vancomycin dosing has been discontinued by the prescriber, pharmacy will sign off at this time.    Please call pharmacy if there are further questions or re-enter a consult if vancomycin is resumed.     ALLYSON VARGHESE

## 2025-07-04 NOTE — NURSING NOTE
.Report to Receiving RN:    Report To: AYO Felix  Time Report Called: [id number]  Hand-Off Communication: Pt tolerated HD well with no concern. Fluid remove 1 Liter Post /66 HR 85  Complications During Treatment: No  Ultrafiltration Treatment: No  Medications Administered During Dialysis: No  Blood Products Administered During Dialysis: No  Labs Sent During Dialysis: No  Heparin Drip Rate Changes: No  Dialysis Catheter Dressing: AVF  Last Dressing Change: N/A

## 2025-07-04 NOTE — PROGRESS NOTES
Manolo Bashir is a 69 y.o. y.o. male on day 2 of admission presenting with Pneumonia of both lower lobes due to infectious organism [J18.9].     SOCIAL WORK NOTE  Received update that patient will be medically cleared for discharge tomorrow. Patient is a HD patient and goes to [id number]; spoke with staff at center & confirmed that chair is still active.     Medical team advised on above.    - Gladis NICOLAS, MA, LSW  Med/Psych & Complex Care   [facility] Care Transitions  [employer] Secure Chat or [phone]

## 2025-07-04 NOTE — PROGRESS NOTES
Manolo Bashir is a 69 y.o. male on day 2 of admission presenting with Chest discomfort, cough, and N/V. Patient is admitted for c/f bilatral pneumonia complicated by right parapneumonic effusion.    Subjective   NAEON. Patient receiving HD this morning, left at 6 am,  out. Per overnight nurse states patient is doing well. Currently on 2L NC, patient is moving around, sitting with no concerns. Still reports shoulder pain: received dose of oxycodone x2 and tylenol x2 yesterday. Oxy this morning.       Objective     Last Recorded Vitals  Temp:  [36 °C (96.8 °F)-37.1 °C (98.8 °F)] 36.6 °C (97.9 °F)  Heart Rate:  [64-85] 84  Resp:  [16-19] 19  BP: (115-174)/(49-77) 151/71  SpO2 Readings from Last 1 Encounters:   07/04/25 98%       Intake/Output last 3 Shifts:  I/O last 3 completed shifts:  In: 1170 (17.2 mL/kg) [P.O.:320; I.V.:400 (5.9 mL/kg); Other:400; IV Piggyback:50]  Out: 2400 (35.3 mL/kg) [Other:2400]  Weight: 68 kg     Relevant Results    CMP  Results from last 72 hours   Lab Units 07/03/25 0624 07/02/25 2203 07/02/25  1216   SODIUM mmol/L 134* 130* 130*   POTASSIUM mmol/L 4.8 5.7* 5.7*   CHLORIDE mmol/L 99 95* 94*   BUN mg/dL 11 20 18   CREATININE mg/dL 4.82* 8.01* 6.77*   GLUCOSE mg/dL 140* 207* 188*   MAGNESIUM mg/dL  --   --  1.78   PHOSPHORUS mg/dL  --  3.7 3.8     CBC  Results from last 72 hours   Lab Units 07/03/25  0624 07/02/25  1216   WBC AUTO x10*3/uL 4.3* 4.7   HEMOGLOBIN g/dL 8.8* 10.6*   HEMATOCRIT % 28.5* 32.1*   MCV fL 92 84   PLATELETS AUTO x10*3/uL 121* 165      Liver Panel  Results from last 72 hours   Lab Units 07/03/25 0624 07/02/25 2203 07/02/25  1216   ALT U/L 60*  --  13   AST U/L 55*  --  25   ALK PHOS U/L 104  --  93   ALBUMIN g/dL 2.6* 2.6* 3.7      Other Labs  7/2 trop 50, 48  7/2 Lactate 1.8  7/2 Prolactin 1.01      Microbiology and Culture  Susceptibility data from last 120 days.  Collected Organism   05/25/25 1807 Staphylococcus hominis   05/20/25 2220 Staphylococcus  hominis       Physical Exam  Physical Exam  Constitutional:       General: He seems well and not in acute distress  HENT:      Head: Normocephalic and atraumatic.      Mouth/Throat:      Mouth: Mucous membranes are dry.   Eyes:      General: No scleral icterus.     Extraocular Movements: Extraocular movements intact.   Cardiovascular:      Rate and Rhythm: Tachycardia present.      Heart sounds: No murmur heard.     No gallop (could not appreciate gallop despite volume status).   Pulmonary:      Effort: No respiratory distress.      Breath sounds: Rales present.      Comments: On 6L NC  Chest:      Chest wall: Tenderness present.   Abdominal:      General: There is distension.      Palpations: There is no mass.      Tenderness: There is abdominal tenderness. There is no right CVA tenderness, left CVA tenderness, guarding or rebound.      Hernia: No hernia is present.   Musculoskeletal:         General: Tenderness (Bilateral Shoulder Tenderness) present.      Cervical back: Limited range of motion in bilateral upper arm (R > L)     Comments: Left AVF for HD   Skin:     General: Skin is warm.      Capillary Refill: Capillary refill takes 2 to 3 seconds.      Coloration: Skin is pale. Skin is not jaundiced.      Findings: No bruising.      Comments: Patient was hot at the belly, normal towards lower extremities   Neurological:      Mental Status: He is currently not disoriented, AOx3     Motor: No weakness, was able to shake hands     Imaging  CT chest abdomen pelvis w IV contrast  Result Date: 7/2/2025  1.  Worsening volume overload likely related to heart failure given worsening bilateral pleural effusions and dependent pulmonary edema/atelectasis and worsening anasarca. Recommend correlation with patient's fluid status an echocardiogram given the presence of moderate to severe cardiomegaly. 2. Worsening portal hypertension with enlarging portal/splenic veins and worsening hepatosplenomegaly as described above.  Findings may be related to right-sided heart failure. 3. Large right shoulder fluid/soft tissue collection with associated cortical scalloping of adjacent osseous structures including the right anterior humerus, distal clavicle, and superior scapula compatible with subacute/chronic inflammatory process. This is better characterized on prior MR and remains worrisome for synovitis versus crystal arthropathy. 4. Dilated main pulmonary artery, which can be seen with pulmonary hypertension. 5. Remaining chronic and nonacute findings are described above.     I have reviewed the images/study and I agree with the findings as stated by Ian Dash MD (PGY-3).   MACRO: None   Signed by: Jj Cai 7/2/2025 4:45 PM Dictation workstation:   [id number]    XR chest 1 view  Result Date: 7/2/2025  1.  Bibasilar infiltrates. 2.  Mild vascular congestion. Signed by Amanuel Welsh MD     TTE Limited 7/3  CONCLUSIONS:   1. Left ventricular ejection fraction is mildly decreased by visual estimate at 45-50%.   2. There is global hypokinesis of the left ventricle with minor regional variations.   3. Spectral Doppler shows a Grade II (pseudonormal pattern) of left ventricular diastolic filling with an elevated left atrial pressure.   4. Left ventricular cavity size is moderately dilated.   5. There is normal right ventricular global systolic function.   6. Mild to moderate mitral valve regurgitation.       Inpatient Medications  Scheduled Medications[1]  Continuous Medications[2]  PRN Medications[3]             Assessment/Plan     Assessment: Manolo is a 70 y.o male with past medical history of stage 3 CKD/ESRD on HD TTS with stage 3 CKD s/p failed bilateral kidney transplant now on HD TTS , HFpEF, Metabolic syndrome, Monoclonal antibody of undetermined significance (MGUS) gastroparesis presenting with chest discomfort, cough, and N/V. Patient was found to be febrile, altered, bibasilar infiltrates on CXR, and signs of volume  overload with cardiomegaly.  Concern for infection, particularly pneumonia in the setting of pleural effusions, fever, AMS, and immunosuppression.      Updates 7/4  -> Received HD today, Planned short HD before discharge on 7/5  -> Pending 7/2 Bcx (NGTD) and 7/3 sputum Cx  -> Limited echo 7/3: EF 45-50%, grade II LV diastolic filling, new hypokinesis and wall abnormalities, plan to consult cardiology   -> Consult Question: Management of New HFrEF in the setting of new Limited echo showing decreased EF of 45-50% compared to the Echo findings of 55% in January 2025 and New hypokinesis of LV wall  -> changed long-acting insulin to home dose (20 units) from 10 units  -> SW confirmed his chair time at [employer] for TTS [id number]     Acute Hospital Problems:   ##Bilateral Pneumonia   Lactate wnl, s/p 500 ml bolus, bolus with caution given fluid overload. Procal elevated at 1.01 with negative flu and covid. No other source of infection suspected given no urological source (anuric), abdominal (negative enteritis on CT, negative abd fluid for SBP on imaging, negative abd symptoms), no extremity infection (no limb injury or necrosis seen on exam, AVF intact and functional). Possible cranial infection but not likely   -> Pending Bcx (7/2), MRSA, sputum culture  -> Day 3/7 abx: Currently on zosyn q8 (7/2 -??) and vanc on (7/2-7/4) and azithro on (7/3 -7/5)  ->Received singular dose of zosyn and vanc in ED (7/2)  ->Tylenol prn for pain and fever   ##C/B Acute Hypoxic Respiratory Failure 2/2 suspected Right parapneumonic effusions vs bilateral pleural effusions  Does not require oxygen at baseline  -> Currently on 3L NC and feeling well, attempt to wean to BL  ##C/B Acute encephalopathy, Resolved  At baseline patient is Aox3 and can ambulate without help.  -> Okay to eat per nurse bedside swallow on 7/2  -> Patient Aox3 on 7/3     ##Volume Overload 2/2 ESRD vs decompensated HFpEF  Volume exam demonstrates 1+ LE edema (laying  down), abdominal distension with positive fluid waves and dullness on percussion. CXR (7/2) shows bibasilar infiltrates, mild vascular congestion. CTAP w/ contrast shows worsening pleural effusions, dependent pulmonary edema, atelectasis, and anasarca. Worsening portal HTN with enlarging portal/splenic veins and hepatosplenomegaly. Dilated main pulmonary artery. Findings were compared to previous images  ##Stage 3 CKD/ESRD s/p bilateral failed kidney transplant on HD TTS via Left AVF  Not a current transplant candidate per 7/24 committee note due to multiple hospitalizations. Both transplanted kidney are not functional however patient is currently on tacrolimus 0.5 mg daily and prednisone 5mg daily. Patient is anuric at baseline and dependent on HD for fluid removal. Currently his Cr is 6.77 with a baseline range of 5-9, estimated CrCl is 10 ml/min.    -> Transplant Nephrology Consulted              -> Continue prednisone, Stop Tacrolimus   -> Dialysis Nephrology Consulted, short HD planned on 7/5 morning              ->HD: 7/3  (net 1.43 fl out), 7/4 (net 800 out)  ##Decompensated Heart failure with preserved Ejection Fraction  Chronic HFpEF with recent echo on 1/25 showing EF of 55%. Concern for decompensation due to cardiomegaly on CTAP. BNP 1.7K on 7/2, last value 2.5K on 4/25. Limited TTE (7/3) Showed LVEF 45-50%, LV global hypokinesis, grade 2 left ventricular diastolic filling w/ increased L atrial pressure, moderately dilated LV cavity, and moderate mitral valve regurg  -> Consult cardiology with goal of concern for new HFrEF from LV hypokinesis and wall abnormality  ##C/B Electrolyte Abnormalities, Resolved  -> Hyperkalemia s/p HyperK cocktail, insulin, and albuterol in ED, 5.7 on admission now 4.8 on 7/3  -> Hyponatremia, 130 on admission now 134 on 7/3     ##Hypertension  Admitted with systolic in 200  -> Continue home regimen: Coreg 37.5 BID, isosorbid mononitrate 30 mg daily, Nifedipine 90 mg BID   ->  Monitor systolic >160 or HR <60    ##Pancytopenia  -> Possible hemoconcentration on 7/2 CBC given significant drop on 7/3 CBC labs s/p 500 ml bolus.  ->Monitor platelets     ##Disposition  -> Social work Consulted  -> PT Consulted  -> OT Consulted       Chronic Medical Problems:     ##T2DM  -> Glargine long acting and SSI  -> glucose high 7/3, return patient long-acting to home dose to 20U     ##MSK Pain, Shoulder R >L   Shoulder pain managed with opioids. ED gave 0.5mg dilaudid  -> Held Norco and Lyrica and stopped percocet in the setting of somnolence   -> Resume oxy 5, flexeril, and lyrica once somnolence is r/o  -> Continue Lidocaine patch for both shoulders     ##Gastroparesis  -> continued reglan 5 mg Tid with meals  -> Zofran Prn held, pending ECG read for normal QT     ##GERD  -> continue home protonix 40 mg daily     ##HLD  -> Continue Home Pravastatin       Fluids: Replete PRN  Electrolytes: Replete PRN  Nutrition: Adult diet Renal; Potassium Restricted 2 gm (50mEq); 2 - 3 grams Sodium  Antimicrobials: azithromycin - 500 mg  piperacillin-tazobactam - 2.25 gram/50 mL  DVT PPX: Heparin subcutaneous   Bowel Regimen: Miralax PRN  LDA: Right Forearm PIV   Oxygen: 3L NC (BL is no oxygen requirements)  Disposition: Home, Pending SW  Code Status: Full Code  NOK: BEN is daughter Purnima Bashir, [phone]  Secondary contact is  Amalia Enriquez, [phone]      Aamir Nuñez MD  Internal Medicine PGY-1    Patient discussed with attending physician, Debbie Brown MD, who agrees with plan.                  [1] azithromycin, 500 mg, oral, Daily  carvedilol, 37.5 mg, oral, BID  epoetin aida or biosimilar, 7,000 Units, intravenous, Once per day on Tuesday Thursday Saturday  heparin (porcine), 5,000 Units, subcutaneous, q8h  [START ON 7/5/2025] insulin glargine, 20 Units, subcutaneous, q24h  insulin lispro, 0-10 Units, subcutaneous, Before meals & nightly  isosorbide mononitrate ER, 30 mg, oral,  Daily  lidocaine, 2 patch, transdermal, Daily  metoclopramide, 5 mg, oral, TID AC  NIFEdipine ER, 90 mg, oral, BID  pantoprazole, 40 mg, oral, Daily before breakfast  piperacillin-tazobactam, 2.25 g, intravenous, q8h  pravastatin, 10 mg, oral, Nightly  predniSONE, 5 mg, oral, Daily  pregabalin, 25 mg, oral, Nightly  vitamin B complex-vitamin C-folic acid, 1 capsule, oral, Daily     [2]    [3] PRN medications: acetaminophen **OR** acetaminophen, cyclobenzaprine, dextrose, dextrose, glucagon, glucagon, ondansetron, oxyCODONE, polyethylene glycol, sodium chloride

## 2025-07-04 NOTE — DISCHARGE INSTRUCTIONS
Dear Mr. Bashir,     It was a pleasure taking care of you while you were in  internal medicine team. During your hospital stay, you were diagnosed with pneumonia, an infection that affects one or both lungs. It causes inflammation and fluid buildup, leading to symptoms such as cough, fever, and difficulty breathing.  Your pneumonia was treated with IV antibiotics in the hospital. You were also given oxygen, which is no longer needed at this time.    Medications and At-Home Care  You have been prescribed the following medications to take at home:  Amoxicillin-clavulanate (Augmentin): This is an antibiotic to continue treating your pneumonia. It’s important to complete the full course as prescribed, even if you start feeling better. You should take one tablet every 12 hours until 7/8.   Losartan (Cozaar) 25 mg : This medication is used to help control your blood pressure and protect your kidneys. You should take it one tablet every day.    Follow-Up Care    See your primary care physician in 2 weeks for follow-up and further evaluation of your condition.  You need to do a blood work in 2 weeks to follow up on your kidney function.   Continue attending all scheduled dialysis sessions, as these are essential to help remove excess fluid caused by your kidney disease and support your recovery.      Please seek immediate medical attention if you experience any of the following:  Persistent or worsening fever  Chest pain  Increasing shortness of breath  Coughing up blood    Please do not hesitate to reach out to us if you have any questions.      internal medicine team      
61.2

## 2025-07-04 NOTE — CONSULTS
Cardiology Attending Note    69-year-old male with CKD on HD, MGUS who presented with chest discomfort, cough and nausea/vomiting found to have likely pneumonia.  Cardiology was consulted for concerns for a slightly reduced ejection fraction and wall motion abnormality.    He reports no significant chest pain or pressure at home.  He can lie flat, no lower extremity edema.    I personally reviewed his echocardiogram from this admission and from January 2025.  On both of the images, his LVEF is approximately 50% with subtle inferoseptal LV hypokinesis.  Overall there is no significant difference.  He had a coronary calcium score in September 2024 which showed a calcium score of 30, which is quite low.    He is hypertensive, blood pressure 130s to 170s/60-70, heart rate 60s to 80s.    On exam, has a normal rate, regular rhythm, no significant lower extremity edema, systolic murmur.    Overall, suspect his LVEF has not changed much and is approximately 50%.  The subtle inferoseptal LV wall motion hypokinesis is stable.  Unclear of the significance of this as his CAC was only 30.  He has no signs or symptoms of active ischemia.  Could consider the addition of losartan for afterload reduction.  He is already on a beta-blocker but he is on HD and thus other medications are limited.    He can follow-up with his cardiologist, Dr. Cali Mai after discharge.    Cardiology will sign off.     Prashant Coleman MD

## 2025-07-04 NOTE — PROGRESS NOTES
Subjective     Interval History: Manolo Bashir    Patient seen on dialysis earlier today, no complaints        Current Medications[1]    Physical Exam  Physical Exam  Heart S1 S2 RRR, Lungs CTA, no edema      Vital signs in last 24 hours:  Temp:  [36 °C (96.8 °F)-37.1 °C (98.8 °F)] 36 °C (96.8 °F)  Heart Rate:  [64-81] 81  Resp:  [16-18] 16  BP: (115-162)/(49-77) 160/63         Labs:  Results from last 7 days   Lab Units 07/03/25  0624   WBC AUTO x10*3/uL 4.3*   RBC AUTO x10*6/uL 3.11*   HEMOGLOBIN g/dL 8.8*   HEMATOCRIT % 28.5*     Results from last 7 days   Lab Units 07/03/25  0624 07/02/25  2203 07/02/25  1216   SODIUM mmol/L 134* 130* 130*   POTASSIUM mmol/L 4.8 5.7* 5.7*   CHLORIDE mmol/L 99 95* 94*   CO2 mmol/L 29 30 27   BUN mg/dL 11 20 18   CREATININE mg/dL 4.82* 8.01* 6.77*   CALCIUM mg/dL 9.0 9.2 10.5   PHOSPHORUS mg/dL  --  3.7 3.8   MAGNESIUM mg/dL  --   --  1.78   BILIRUBIN TOTAL mg/dL 0.6  --  0.6   ALT U/L 60*  --  13   AST U/L 55*  --  25            Assessment/Plan   Patient seen and examined while on dialysis, recent events, labs, medications reviewed. Will follow overall management per primary team, and continue regular dialysis.  Plan HD on 7/5 to resume TTS schedule prior to discharge.  Assessment & Plan  Pneumonia of both lower lobes due to infectious organism  ESRD      Jose Olmstead MD  7/4/2025  10:06 AM       [1]   Current Facility-Administered Medications:     acetaminophen (Tylenol) oral liquid 650 mg, 650 mg, oral, q4h PRN **OR** acetaminophen (Tylenol) tablet 650 mg, 650 mg, oral, q4h PRN, Aamir Nuñez MD, 650 mg at 07/03/25 2043    azithromycin (Zithromax) tablet 500 mg, 500 mg, oral, Daily, Aamir Nuñez MD, 500 mg at 07/03/25 1228    carvedilol (Coreg) tablet 37.5 mg, 37.5 mg, oral, BID, Aamir Nuñez MD, 37.5 mg at 07/03/25 2030    cyclobenzaprine (Flexeril) tablet 5 mg, 5 mg, oral, BID PRN, Nicol Grant MD    dextrose 50 % injection 12.5 g, 12.5 g, intravenous,  q15 min PRN, Aamir Nuñez MD    dextrose 50 % injection 25 g, 25 g, intravenous, q15 min PRN, Aamir Nuñez MD    epoetin aida (Epogen) injection 7,000 Units, 7,000 Units, intravenous, Once per day on Tuesday Thursday Saturday, Juhi Vázquez APRN-CNP, 7,000 Units at 07/03/25 2357    glucagon (Glucagen) injection 1 mg, 1 mg, intramuscular, q15 min PRN, Aamir Nuñez MD    glucagon (Glucagen) injection 1 mg, 1 mg, intramuscular, q15 min PRN, Aamri Nuñez MD    heparin (porcine) injection 5,000 Units, 5,000 Units, subcutaneous, q8h, Aamir Nuñez MD, 5,000 Units at 07/04/25 0243    [START ON 7/5/2025] insulin glargine (Lantus) injection 20 Units, 20 Units, subcutaneous, q24h, Aamir Nuñez MD    insulin lispro injection 0-10 Units, 0-10 Units, subcutaneous, Before meals & nightly, Aamir Nuñez MD, 4 Units at 07/03/25 2031    isosorbide mononitrate ER (Imdur) 24 hr tablet 30 mg, 30 mg, oral, Daily, Nicol Grant MD, 30 mg at 07/03/25 1228    lidocaine 4 % patch 2 patch, 2 patch, transdermal, Daily, Aamir Nuñez MD, 2 patch at 07/03/25 0901    metoclopramide (Reglan) tablet 5 mg, 5 mg, oral, TID AC, Aamir Nuñez MD, 5 mg at 07/03/25 1626    NIFEdipine ER (Adalat CC) 24 hr tablet 90 mg, 90 mg, oral, BID, Aamir Nuñez MD, 90 mg at 07/03/25 2030    ondansetron (Zofran) injection 4 mg, 4 mg, intravenous, q4h PRN, Aamir Nuñez MD    oxyCODONE (Roxicodone) immediate release tablet 5 mg, 5 mg, oral, q6h PRN, Aamir Nuñez MD, 5 mg at 07/04/25 0610    pantoprazole (ProtoNix) EC tablet 40 mg, 40 mg, oral, Daily before breakfast, Aamir Nuñez MD, 40 mg at 07/04/25 0610    piperacillin-tazobactam (Zosyn) 2.25 g in dextrose (iso) IV 50 mL, 2.25 g, intravenous, q8h, Aamir Nuñez MD, Stopped at 07/04/25 0336    polyethylene glycol (Glycolax, Miralax) packet 17 g, 17 g, oral, Daily PRN, Aamir Nuñez MD    pravastatin (Pravachol) tablet 10 mg, 10 mg, oral,  Nightly, Aamir Nuñez MD, 10 mg at 07/03/25 2031    predniSONE (Deltasone) tablet 5 mg, 5 mg, oral, Daily, Aamir Nuñez MD, 5 mg at 07/03/25 1228    pregabalin (Lyrica) capsule 25 mg, 25 mg, oral, Nightly, Nicol Grant MD, 25 mg at 07/03/25 2031    sodium chloride (Ocean) 0.65 % nasal spray 1 spray, 1 spray, Each Nostril, 4x daily PRN, Aamir Nuñez MD    vancomycin (Vancocin) pharmacy to dose - pharmacy monitoring, , miscellaneous, Daily PRN, Aamir Nuñez MD    vitamin B complex-vitamin C-folic acid (Nephrocaps) capsule 1 capsule, 1 capsule, oral, Daily, Aamir Nuñez MD, 1 capsule at 07/03/25 0901

## 2025-07-05 ENCOUNTER — PHARMACY VISIT (OUTPATIENT)
Dept: PHARMACY | Facility: CLINIC | Age: 70
End: 2025-07-05
Payer: COMMERCIAL

## 2025-07-05 ENCOUNTER — APPOINTMENT (OUTPATIENT)
Dept: DIALYSIS | Facility: HOSPITAL | Age: 70
End: 2025-07-05
Payer: COMMERCIAL

## 2025-07-05 VITALS
OXYGEN SATURATION: 97 % | RESPIRATION RATE: 17 BRPM | SYSTOLIC BLOOD PRESSURE: 133 MMHG | WEIGHT: 143.08 LBS | BODY MASS INDEX: 21.68 KG/M2 | HEART RATE: 89 BPM | HEIGHT: 68 IN | DIASTOLIC BLOOD PRESSURE: 71 MMHG | TEMPERATURE: 97.9 F

## 2025-07-05 LAB
ALBUMIN SERPL BCP-MCNC: 2.8 G/DL (ref 3.4–5)
ANION GAP SERPL CALC-SCNC: 11 MMOL/L (ref 10–20)
BASOPHILS # BLD AUTO: 0.01 X10*3/UL (ref 0–0.1)
BASOPHILS NFR BLD AUTO: 0.3 %
BUN SERPL-MCNC: 15 MG/DL (ref 6–23)
CALCIUM SERPL-MCNC: 9.2 MG/DL (ref 8.6–10.6)
CHLORIDE SERPL-SCNC: 96 MMOL/L (ref 98–107)
CO2 SERPL-SCNC: 32 MMOL/L (ref 21–32)
CREAT SERPL-MCNC: 5.22 MG/DL (ref 0.5–1.3)
EGFRCR SERPLBLD CKD-EPI 2021: 11 ML/MIN/1.73M*2
EOSINOPHIL # BLD AUTO: 0.21 X10*3/UL (ref 0–0.7)
EOSINOPHIL NFR BLD AUTO: 5.9 %
ERYTHROCYTE [DISTWIDTH] IN BLOOD BY AUTOMATED COUNT: 14.7 % (ref 11.5–14.5)
GLUCOSE BLD MANUAL STRIP-MCNC: 172 MG/DL (ref 74–99)
GLUCOSE BLD MANUAL STRIP-MCNC: 182 MG/DL (ref 74–99)
GLUCOSE BLD MANUAL STRIP-MCNC: 243 MG/DL (ref 74–99)
GLUCOSE SERPL-MCNC: 150 MG/DL (ref 74–99)
HCT VFR BLD AUTO: 27.7 % (ref 41–52)
HGB BLD-MCNC: 8.6 G/DL (ref 13.5–17.5)
IMM GRANULOCYTES # BLD AUTO: 0.01 X10*3/UL (ref 0–0.7)
IMM GRANULOCYTES NFR BLD AUTO: 0.3 % (ref 0–0.9)
LYMPHOCYTES # BLD AUTO: 0.91 X10*3/UL (ref 1.2–4.8)
LYMPHOCYTES NFR BLD AUTO: 25.8 %
MAGNESIUM SERPL-MCNC: 1.79 MG/DL (ref 1.6–2.4)
MCH RBC QN AUTO: 27.7 PG (ref 26–34)
MCHC RBC AUTO-ENTMCNC: 31 G/DL (ref 32–36)
MCV RBC AUTO: 89 FL (ref 80–100)
MONOCYTES # BLD AUTO: 0.41 X10*3/UL (ref 0.1–1)
MONOCYTES NFR BLD AUTO: 11.6 %
NEUTROPHILS # BLD AUTO: 1.98 X10*3/UL (ref 1.2–7.7)
NEUTROPHILS NFR BLD AUTO: 56.1 %
NRBC BLD-RTO: 0 /100 WBCS (ref 0–0)
PHOSPHATE SERPL-MCNC: 3 MG/DL (ref 2.5–4.9)
PLATELET # BLD AUTO: 110 X10*3/UL (ref 150–450)
POTASSIUM SERPL-SCNC: 3.7 MMOL/L (ref 3.5–5.3)
RBC # BLD AUTO: 3.1 X10*6/UL (ref 4.5–5.9)
SODIUM SERPL-SCNC: 135 MMOL/L (ref 136–145)
WBC # BLD AUTO: 3.5 X10*3/UL (ref 4.4–11.3)

## 2025-07-05 PROCEDURE — 83735 ASSAY OF MAGNESIUM: CPT

## 2025-07-05 PROCEDURE — 2500000005 HC RX 250 GENERAL PHARMACY W/O HCPCS

## 2025-07-05 PROCEDURE — 90935 HEMODIALYSIS ONE EVALUATION: CPT | Performed by: INTERNAL MEDICINE

## 2025-07-05 PROCEDURE — 2500000002 HC RX 250 W HCPCS SELF ADMINISTERED DRUGS (ALT 637 FOR MEDICARE OP, ALT 636 FOR OP/ED)

## 2025-07-05 PROCEDURE — 2500000001 HC RX 250 WO HCPCS SELF ADMINISTERED DRUGS (ALT 637 FOR MEDICARE OP)

## 2025-07-05 PROCEDURE — 8010000001 HC DIALYSIS - HEMODIALYSIS PER DAY

## 2025-07-05 PROCEDURE — 82947 ASSAY GLUCOSE BLOOD QUANT: CPT

## 2025-07-05 PROCEDURE — 99232 SBSQ HOSP IP/OBS MODERATE 35: CPT

## 2025-07-05 PROCEDURE — 84100 ASSAY OF PHOSPHORUS: CPT

## 2025-07-05 PROCEDURE — 99239 HOSP IP/OBS DSCHRG MGMT >30: CPT

## 2025-07-05 PROCEDURE — 2500000004 HC RX 250 GENERAL PHARMACY W/ HCPCS (ALT 636 FOR OP/ED)

## 2025-07-05 PROCEDURE — RXMED WILLOW AMBULATORY MEDICATION CHARGE

## 2025-07-05 PROCEDURE — 80069 RENAL FUNCTION PANEL: CPT

## 2025-07-05 PROCEDURE — 36415 COLL VENOUS BLD VENIPUNCTURE: CPT

## 2025-07-05 PROCEDURE — 85025 COMPLETE CBC W/AUTO DIFF WBC: CPT

## 2025-07-05 RX ORDER — LOSARTAN POTASSIUM 25 MG/1
25 TABLET ORAL DAILY
Qty: 30 TABLET | Refills: 2 | Status: SHIPPED | OUTPATIENT
Start: 2025-07-05

## 2025-07-05 RX ORDER — AMOXICILLIN AND CLAVULANATE POTASSIUM 875; 125 MG/1; MG/1
1 TABLET, FILM COATED ORAL 2 TIMES DAILY
Qty: 6 TABLET | Refills: 0 | Status: SHIPPED | OUTPATIENT
Start: 2025-07-05 | End: 2025-07-08

## 2025-07-05 RX ADMIN — CARVEDILOL 37.5 MG: 6.25 TABLET, FILM COATED ORAL at 08:35

## 2025-07-05 RX ADMIN — NIFEDIPINE 90 MG: 90 TABLET, FILM COATED, EXTENDED RELEASE ORAL at 08:36

## 2025-07-05 RX ADMIN — ASCORBIC ACID, THIAMINE MONONITRATE,RIBOFLAVIN, NIACINAMIDE, PYRIDOXINE HYDROCHLORIDE, FOLIC ACID, CYANOCOBALAMIN, BIOTIN, CALCIUM PANTOTHENATE, 1 CAPSULE: 100; 1.5; 1.7; 20; 10; 1; 6000; 150000; 5 CAPSULE, LIQUID FILLED ORAL at 08:36

## 2025-07-05 RX ADMIN — PANTOPRAZOLE SODIUM 40 MG: 40 TABLET, DELAYED RELEASE ORAL at 06:47

## 2025-07-05 RX ADMIN — INSULIN LISPRO 2 UNITS: 100 INJECTION, SOLUTION INTRAVENOUS; SUBCUTANEOUS at 08:36

## 2025-07-05 RX ADMIN — INSULIN GLARGINE 20 UNITS: 100 INJECTION, SOLUTION SUBCUTANEOUS at 11:25

## 2025-07-05 RX ADMIN — CYCLOBENZAPRINE 5 MG: 10 TABLET, FILM COATED ORAL at 08:35

## 2025-07-05 RX ADMIN — INSULIN LISPRO 2 UNITS: 100 INJECTION, SOLUTION INTRAVENOUS; SUBCUTANEOUS at 16:10

## 2025-07-05 RX ADMIN — PREDNISONE 5 MG: 5 TABLET ORAL at 08:36

## 2025-07-05 RX ADMIN — METOCLOPRAMIDE 5 MG: 5 TABLET ORAL at 06:47

## 2025-07-05 RX ADMIN — PIPERACILLIN SODIUM AND TAZOBACTAM SODIUM 2.25 G: 2; .25 INJECTION, SOLUTION INTRAVENOUS at 16:10

## 2025-07-05 RX ADMIN — METOCLOPRAMIDE 5 MG: 5 TABLET ORAL at 16:10

## 2025-07-05 RX ADMIN — LIDOCAINE 4% 2 PATCH: 40 PATCH TOPICAL at 08:36

## 2025-07-05 RX ADMIN — AZITHROMYCIN DIHYDRATE 500 MG: 500 TABLET ORAL at 08:36

## 2025-07-05 RX ADMIN — ISOSORBIDE MONONITRATE 30 MG: 30 TABLET, EXTENDED RELEASE ORAL at 08:36

## 2025-07-05 RX ADMIN — HEPARIN SODIUM 5000 UNITS: 5000 INJECTION, SOLUTION INTRAVENOUS; SUBCUTANEOUS at 06:47

## 2025-07-05 RX ADMIN — HEPARIN SODIUM 5000 UNITS: 5000 INJECTION, SOLUTION INTRAVENOUS; SUBCUTANEOUS at 16:10

## 2025-07-05 RX ADMIN — OXYCODONE HYDROCHLORIDE 5 MG: 5 TABLET ORAL at 16:13

## 2025-07-05 RX ADMIN — PIPERACILLIN SODIUM AND TAZOBACTAM SODIUM 2.25 G: 2; .25 INJECTION, SOLUTION INTRAVENOUS at 06:46

## 2025-07-05 RX ADMIN — OXYCODONE HYDROCHLORIDE 5 MG: 5 TABLET ORAL at 08:35

## 2025-07-05 ASSESSMENT — COGNITIVE AND FUNCTIONAL STATUS - GENERAL
CLIMB 3 TO 5 STEPS WITH RAILING: A LITTLE
MOBILITY SCORE: 21
STANDING UP FROM CHAIR USING ARMS: A LITTLE
TOILETING: A LITTLE
DAILY ACTIVITIY SCORE: 23
WALKING IN HOSPITAL ROOM: A LITTLE

## 2025-07-05 ASSESSMENT — PAIN SCALES - GENERAL
PAINLEVEL_OUTOF10: 2
PAINLEVEL_OUTOF10: 7
PAINLEVEL_OUTOF10: 8
PAINLEVEL_OUTOF10: 1
PAINLEVEL_OUTOF10: 3
PAINLEVEL_OUTOF10: 0 - NO PAIN

## 2025-07-05 ASSESSMENT — PAIN - FUNCTIONAL ASSESSMENT
PAIN_FUNCTIONAL_ASSESSMENT: 0-10

## 2025-07-05 ASSESSMENT — PAIN DESCRIPTION - DESCRIPTORS
DESCRIPTORS: ACHING

## 2025-07-05 NOTE — CARE PLAN
The patient's goals for the shift include Patient will remain safe and free from fall and injury during shift    The clinical goals for the shift include Patient will remain HDS during shift    Other goals include:  Problem: Pain - Adult  Goal: Verbalizes/displays adequate comfort level or baseline comfort level  Outcome: Progressing     Problem: Safety - Adult  Goal: Free from fall injury  Outcome: Progressing     Problem: Discharge Planning  Goal: Discharge to home or other facility with appropriate resources  Outcome: Progressing     Problem: Chronic Conditions and Co-morbidities  Goal: Patient's chronic conditions and co-morbidity symptoms are monitored and maintained or improved  Outcome: Progressing     Problem: Nutrition  Goal: Nutrient intake appropriate for maintaining nutritional needs  Outcome: Progressing     Problem: Skin  Goal: Decreased wound size/increased tissue granulation at next dressing change  Outcome: Progressing  Goal: Participates in plan/prevention/treatment measures  Outcome: Progressing  Goal: Prevent/manage excess moisture  Outcome: Progressing  Goal: Prevent/minimize sheer/friction injuries  Outcome: Progressing  Goal: Promote/optimize nutrition  Outcome: Progressing  Goal: Promote skin healing  Outcome: Progressing     Problem: Diabetes  Goal: Achieve decreasing blood glucose levels by end of shift  Outcome: Progressing  Goal: Increase stability of blood glucose readings by end of shift  Outcome: Progressing  Goal: Decrease in ketones present in urine by end of shift  Outcome: Progressing  Goal: Maintain electrolyte levels within acceptable range throughout shift  Outcome: Progressing  Goal: Maintain glucose levels >70mg/dl to <250mg/dl throughout shift  Outcome: Progressing  Goal: No changes in neurological exam by end of shift  Outcome: Progressing  Goal: Learn about and adhere to nutrition recommendations by end of shift  Outcome: Progressing  Goal: Vital signs within normal range  for age by end of shift  Outcome: Progressing  Goal: Increase self care and/or family involovement by end of shift  Outcome: Progressing  Goal: Receive DSME education by end of shift  Outcome: Progressing     Problem: Pain  Goal: Takes deep breaths with improved pain control throughout the shift  Outcome: Progressing  Goal: Turns in bed with improved pain control throughout the shift  Outcome: Progressing  Goal: Walks with improved pain control throughout the shift  Outcome: Progressing  Goal: Performs ADL's with improved pain control throughout shift  Outcome: Progressing  Goal: Participates in PT with improved pain control throughout the shift  Outcome: Progressing  Goal: Free from opioid side effects throughout the shift  Outcome: Progressing  Goal: Free from acute confusion related to pain meds throughout the shift  Outcome: Progressing

## 2025-07-05 NOTE — NURSING NOTE
Walking pulse ox     Patient pulse ox 97 on room air sitting at side of bed. Patient walked for 5 min and pulse ox was 96% no oxygen needed

## 2025-07-05 NOTE — NURSING NOTE
Report to Receiving RN:    Report To: Elvira (RN)  Time Report Called: [id number]  Hand-Off Communication: Tolerated 3 hrs if iHD well without issue.  Droplet iso maintained.  1 liter removed.  /66 HR 87.  Complications During Treatment: No  Ultrafiltration Treatment: No  Medications Administered During Dialysis: No  Blood Products Administered During Dialysis: No  Labs Sent During Dialysis: No  Heparin Drip Rate Changes: N/A  Dialysis Catheter Dressing: na - AVF  Last Dressing Change: na      Last Updated: 4:06 PM by VASILIY ROSALES

## 2025-07-05 NOTE — NURSING NOTE
Report from Sending RN:    Report From: claire ROLLINS  Recent Surgery of Procedure: No  Baseline Level of Consciousness (LOC): A&O X4  Oxygen Use: Yes, O2 @ 3 liters per nc, prn  Type: pulse ox 97% on room air  Diabetic: Yes, blood sugar 172  Last BP Med Given Day of Dialysis: 0835 - coreg 37.5 mg po, imdur 30 mg po, nifedipine 90 mg po  Last Pain Med Given: 0835 - flexeril, oxycodone 5 mg po  Lab Tests to be Obtained with Dialysis: No  Blood Transfusion to be Given During Dialysis: No  Available IV Access: Yes  Medications to be Administered During Dialysis: No  Continuous IV Infusion Running: No  Restraints on Currently or in the Last 24 Hours: No  Hand-Off Communication: Full Code, Droplet Precautions, B/L pneumonia, hx ESRD, DM, VAA  Dialysis Catheter Dressing: N/A  Last Dressing Change: N/A

## 2025-07-05 NOTE — PROCEDURES
"DIALYSIS NOTE:    Seen during hemodialysis, undergoing treatment per submitted orders: 2 K, 2.5 Ca, 3  hours. Fluid removal 1-1.5 liters, as tolerated (keep SBP> 90mmHg).     /71 (BP Location: Right arm, Patient Position: Lying)   Pulse 89   Temp 36.6 °C (97.9 °F) (Temporal)   Resp 17   Ht 1.727 m (5' 8\")   Wt 64.9 kg (143 lb 1.3 oz) Comment: post tx wt  SpO2 97%   BMI 21.76 kg/m²     Additional Recommendations:    Scheduled medications  Scheduled Medications[1]  Continuous medications  Continuous Medications[2]  PRN medications  PRN Medications[3]    Recent Results (from the past 24 hours)   POCT GLUCOSE    Collection Time: 07/04/25  9:59 PM   Result Value Ref Range    POCT Glucose 282 (H) 74 - 99 mg/dL   CBC and Auto Differential    Collection Time: 07/05/25  6:40 AM   Result Value Ref Range    WBC 3.5 (L) 4.4 - 11.3 x10*3/uL    nRBC 0.0 0.0 - 0.0 /100 WBCs    RBC 3.10 (L) 4.50 - 5.90 x10*6/uL    Hemoglobin 8.6 (L) 13.5 - 17.5 g/dL    Hematocrit 27.7 (L) 41.0 - 52.0 %    MCV 89 80 - 100 fL    MCH 27.7 26.0 - 34.0 pg    MCHC 31.0 (L) 32.0 - 36.0 g/dL    RDW 14.7 (H) 11.5 - 14.5 %    Platelets 110 (L) 150 - 450 x10*3/uL    Neutrophils % 56.1 40.0 - 80.0 %    Immature Granulocytes %, Automated 0.3 0.0 - 0.9 %    Lymphocytes % 25.8 13.0 - 44.0 %    Monocytes % 11.6 2.0 - 10.0 %    Eosinophils % 5.9 0.0 - 6.0 %    Basophils % 0.3 0.0 - 2.0 %    Neutrophils Absolute 1.98 1.20 - 7.70 x10*3/uL    Immature Granulocytes Absolute, Automated 0.01 0.00 - 0.70 x10*3/uL    Lymphocytes Absolute 0.91 (L) 1.20 - 4.80 x10*3/uL    Monocytes Absolute 0.41 0.10 - 1.00 x10*3/uL    Eosinophils Absolute 0.21 0.00 - 0.70 x10*3/uL    Basophils Absolute 0.01 0.00 - 0.10 x10*3/uL   Renal Function Panel    Collection Time: 07/05/25  6:40 AM   Result Value Ref Range    Glucose 150 (H) 74 - 99 mg/dL    Sodium 135 (L) 136 - 145 mmol/L    Potassium 3.7 3.5 - 5.3 mmol/L    Chloride 96 (L) 98 - 107 mmol/L    Bicarbonate 32 21 - 32 " mmol/L    Anion Gap 11 10 - 20 mmol/L    Urea Nitrogen 15 6 - 23 mg/dL    Creatinine 5.22 (H) 0.50 - 1.30 mg/dL    eGFR 11 (L) >60 mL/min/1.73m*2    Calcium 9.2 8.6 - 10.6 mg/dL    Phosphorus 3.0 2.5 - 4.9 mg/dL    Albumin 2.8 (L) 3.4 - 5.0 g/dL   Magnesium    Collection Time: 07/05/25  6:40 AM   Result Value Ref Range    Magnesium 1.79 1.60 - 2.40 mg/dL   POCT GLUCOSE    Collection Time: 07/05/25  7:31 AM   Result Value Ref Range    POCT Glucose 172 (H) 74 - 99 mg/dL   POCT GLUCOSE    Collection Time: 07/05/25 11:34 AM   Result Value Ref Range    POCT Glucose 243 (H) 74 - 99 mg/dL   POCT GLUCOSE    Collection Time: 07/05/25  3:58 PM   Result Value Ref Range    POCT Glucose 182 (H) 74 - 99 mg/dL            [1] carvedilol, 37.5 mg, oral, BID  epoetin aida or biosimilar, 7,000 Units, intravenous, Once per day on Tuesday Thursday Saturday  heparin (porcine), 5,000 Units, subcutaneous, q8h  insulin glargine, 20 Units, subcutaneous, q24h  insulin lispro, 0-10 Units, subcutaneous, Before meals & nightly  isosorbide mononitrate ER, 30 mg, oral, Daily  lidocaine, 2 patch, transdermal, Daily  metoclopramide, 5 mg, oral, TID AC  NIFEdipine ER, 90 mg, oral, BID  pantoprazole, 40 mg, oral, Daily before breakfast  piperacillin-tazobactam, 2.25 g, intravenous, q8h  pravastatin, 10 mg, oral, Nightly  predniSONE, 5 mg, oral, Daily  pregabalin, 25 mg, oral, Nightly  vitamin B complex-vitamin C-folic acid, 1 capsule, oral, Daily  [2]    [3] PRN medications: acetaminophen **OR** acetaminophen, cyclobenzaprine, dextrose, dextrose, glucagon, glucagon, ondansetron, oxyCODONE, polyethylene glycol, sodium chloride

## 2025-07-05 NOTE — PROGRESS NOTES
Kelvin Bashir is a 69 y.o. male on day 3 of admission presenting with Pneumonia of both lower lobes due to infectious organism.      Subjective   NAEON. Received HD yesterday without any complications. Currently on RA. Plan for another HD session today.   Pain is improved. No N/V no chest pain or SOB        Objective     Last Recorded Vitals  /75   Pulse 81   Temp 35.7 °C (96.3 °F) (Temporal)   Resp 17   Wt 66.4 kg (146 lb 6.2 oz) Comment: pre tx wt'  SpO2 99%   Intake/Output last 3 Shifts:    Intake/Output Summary (Last 24 hours) at 7/5/2025 1445  Last data filed at 7/4/2025 1810  Gross per 24 hour   Intake 260 ml   Output --   Net 260 ml       Admission Weight  Weight: 68 kg (150 lb) (07/02/25 1052)    Daily Weight  07/05/25 : 66.4 kg (146 lb 6.2 oz)    Image Results  Transthoracic Echo (TTE) [facility], [address] [place] [address]                 Tel [phone] and Fax [phone]    TRANSTHORACIC ECHOCARDIOGRAM REPORT       Patient Name:       KELVIN BASHIR      Reading Physician:    [id number] Rocha MD  Study Date:         7/3/2025            Ordering Provider:    Lori DAVIS  MRN/PID:            [id number]            Fellow:  Accession#:         [id number]        Nurse:  Date of Birth/Age:  1955 / 69      Sonographer:          Sonia Canchola RDCS                      years  Gender assigned at  M                   Additional Staff:  Birth:  Height:             172.72 cm           Admit Date:  Weight:             68.04 kg            Admission Status:     Inpatient -                                                                Routine  BSA / BMI:          1.81 m2 / 22.81     Encounter#:           [id number]                      kg/m2  Blood Pressure:     126/60 mmHg         Department Location:  [facility]                                                                 Non Invasive    Study Type:    TRANSTHORACIC ECHO (TTE) LIMITED  Diagnosis/ICD: Chronic diastolic (congestive) heart failure (CHF)-I50.32  Indication:    HFpEF, CHF  CPT Code:      Echo Limited-91775; Doppler Limited-32003; Color Doppler-15277    Patient History:  Pertinent History: HFpEF, CHF, DLD, PNA, Adenocarcinoma of prostate, DM, HLD,                     GERD, ESRD.    Study Detail: The following Echo studies were performed: 2D, Doppler and color                flow. Technically challenging study due to body habitus. Definity                used as a contrast agent for endocardial border definition. Total                contrast used for this procedure was 3.0 mL via IV push.       PHYSICIAN INTERPRETATION:  Left Ventricle: Left ventricular ejection fraction is mildly decreased by visual estimate at 45-50%. There is concentric left ventricular hypertrophy. There is global hypokinesis of the left ventricle with minor regional variations. The left ventricular cavity size is moderately dilated. There is normal septal and normal posterior left ventricular wall thickness. Spectral Doppler shows a Grade II (pseudonormal pattern) of left ventricular diastolic filling with an elevated left atrial pressure.  Left Atrium: The left atrial size is upper limits of normal.  Right Ventricle: The right ventricle is normal in size. There is normal right ventricular global systolic function.  Right Atrium: The right atrium is mildly dilated.  Aortic Valve: The aortic valve is trileaflet. There is evidence of mild aortic valve stenosis. There is trace to mild aortic valve regurgitation.  Mitral Valve: The mitral valve is mildly thickened. There is mild mitral annular calcification. There is mild to moderate mitral valve regurgitation. The E Vmax is 1.00 m/s.  Tricuspid Valve: The tricuspid valve is structurally normal. There is trace tricuspid regurgitation.  Pulmonic Valve:  The pulmonic valve is not well visualized. There is physiologic pulmonic valve regurgitation.  Pericardium: Trivial pericardial effusion.  Aorta: The aortic root is normal.  Systemic Veins: The inferior vena cava appears normal in size, with IVC inspiratory collapse greater than 50%.  In comparison to the previous echocardiogram(s): Compared with study dated 1/28/2025, no significant change.       CONCLUSIONS:   1. Left ventricular ejection fraction is mildly decreased by visual estimate at 45-50%.   2. There is global hypokinesis of the left ventricle with minor regional variations.   3. Spectral Doppler shows a Grade II (pseudonormal pattern) of left ventricular diastolic filling with an elevated left atrial pressure.   4. Left ventricular cavity size is moderately dilated.   5. There is normal right ventricular global systolic function.   6. Mild to moderate mitral valve regurgitation.    QUANTITATIVE DATA SUMMARY:     2D MEASUREMENTS:          Normal Ranges:  LAs:             4.63 cm  (2.7-4.0cm)  IVSd:            0.81 cm  (0.6-1.1cm)  LVPWd:           0.92 cm  (0.6-1.1cm)  LVIDd:           6.15 cm  (3.9-5.9cm)  LVIDs:           4.51 cm  LV Mass Index:   118 g/m2  LVEDV Index:     89 ml/m2  LV % FS          26.7 %       AORTA MEASUREMENTS:         Normal Ranges:  Asc Ao, d:          3.20 cm (2.1-3.4cm)       LV SYSTOLIC FUNCTION:                       Normal Ranges:  EF-A4C View:    47 % (>=55%)  EF-A2C View:    22 %  EF-Biplane:     37 %  EF-Visual:      48 %  LV EF Reported: 48 %       LV DIASTOLIC FUNCTION:             Normal Ranges:  MV Peak E:             1.00 m/s    (0.7-1.2 m/s)  MV Peak A:             0.86 m/s    (0.42-0.7 m/s)  E/A Ratio:             1.16        (1.0-2.2)  MV A Dur:              161.48 msec  MV DT:                 170 msec    (150-240 msec)  PulmV Sys Bharat:         59.04 cm/s  PulmV Yoder Bharat:        53.97 cm/s  PulmV S/D Bharat:         1.09  PulmV A Revs Bharat:      23.18 cm/s  PulmV A Revs  Dur:      73.82 msec       MITRAL VALVE:          Normal Ranges:  MV DT:        170 msec (150-240msec)       AORTIC VALVE:            Normal Ranges:  AoV Vmax:      2.19 m/s  (<=1.7m/s)  AoV Peak P.3 mmHg (<20mmHg)  LVOT Max Bharat:  1.20 m/s  (<=1.1m/s)  LVOT VTI:      24.25 cm  LVOT Diameter: 2.21 cm   (1.8-2.4cm)  AoV Area,Vmax: 2.11 cm2  (2.5-4.5cm2)       AORTIC INSUFFICIENCY:  AI Vmax:       3.88 m/s  AI Half-time:  341 msec  AI Decel Time: 1175 msec  AI Decel Rate: 330.58 cm/s2       RIGHT VENTRICLE:  RV Basal 3.70 cm  RV Mid   2.00 cm       TRICUSPID VALVE/RVSP:         Normal Ranges:  Est. RA Pressure:     3  IVC Diam:             1.30 cm       PULMONIC VALVE:          Normal Ranges:  PV Accel Time:  129 msec (>120ms)       PULMONARY VEINS:  PulmV A Revs Dur: 73.82 msec  PulmV A Revs Bharat: 23.18 cm/s  PulmV Yoder Bharat:   53.97 cm/s  PulmV S/D Bharat:    1.09  PulmV Sys Bharat:    59.04 cm/s       [id number] Kwabena Rocha MD  Electronically signed on 7/3/2025 at 12:52:05 PM       ** Final **  ECG 12 lead  Normal sinus rhythm  Left axis deviation  Nonspecific intraventricular block  Cannot rule out Septal infarct , age undetermined  Abnormal ECG  When compared with ECG of 2025 09:46,  Questionable change in QRS axis    See ED provider note for full interpretation and clinical correlation  Confirmed by Millicent Dickey ([id number]) on 7/3/2025 2:36:45 AM      Physical Exam  Constitutional:       General: He seems well and not in acute distress  HENT:      Head: Normocephalic and atraumatic.      Mouth/Throat:     Eyes:      General: No scleral icterus.     Extraocular Movements: Extraocular movements intact.   Cardiovascular:      Rate and Rhythm: NORMAL      Heart sounds: No murmur heard.     No gallop  Pulmonary:      Effort: No respiratory distress.      Breath sounds: Rales present.      Comments: On RA  Abdominal:      General: There is distension.      Palpations: There is no mass.      Tenderness: There is no right  CVA tenderness, left CVA tenderness, guarding or rebound.      Hernia: No hernia is present.   Musculoskeletal:         General: Tenderness (Bilateral Shoulder Tenderness) present.      Cervical back: Limited range of motion in bilateral upper arm (R > L)     Comments: Left AVF for HD   Skin:     General: Skin is warm.      Capillary Refill: Capillary refill takes 2 to 3 seconds.      Coloration: Skin is pale. Skin is not jaundiced.      Findings: No bruising.      Comments: Patient was hot at the belly, normal towards lower extremities   Neurological:      Mental Status: He is currently not disoriented, AOx3     Motor: No weakness, was able to shake hands      Relevant Results  Results for orders placed or performed during the hospital encounter of 07/02/25 (from the past 24 hours)   POCT GLUCOSE   Result Value Ref Range    POCT Glucose 269 (H) 74 - 99 mg/dL   POCT GLUCOSE   Result Value Ref Range    POCT Glucose 282 (H) 74 - 99 mg/dL   CBC and Auto Differential   Result Value Ref Range    WBC 3.5 (L) 4.4 - 11.3 x10*3/uL    nRBC 0.0 0.0 - 0.0 /100 WBCs    RBC 3.10 (L) 4.50 - 5.90 x10*6/uL    Hemoglobin 8.6 (L) 13.5 - 17.5 g/dL    Hematocrit 27.7 (L) 41.0 - 52.0 %    MCV 89 80 - 100 fL    MCH 27.7 26.0 - 34.0 pg    MCHC 31.0 (L) 32.0 - 36.0 g/dL    RDW 14.7 (H) 11.5 - 14.5 %    Platelets 110 (L) 150 - 450 x10*3/uL    Neutrophils % 56.1 40.0 - 80.0 %    Immature Granulocytes %, Automated 0.3 0.0 - 0.9 %    Lymphocytes % 25.8 13.0 - 44.0 %    Monocytes % 11.6 2.0 - 10.0 %    Eosinophils % 5.9 0.0 - 6.0 %    Basophils % 0.3 0.0 - 2.0 %    Neutrophils Absolute 1.98 1.20 - 7.70 x10*3/uL    Immature Granulocytes Absolute, Automated 0.01 0.00 - 0.70 x10*3/uL    Lymphocytes Absolute 0.91 (L) 1.20 - 4.80 x10*3/uL    Monocytes Absolute 0.41 0.10 - 1.00 x10*3/uL    Eosinophils Absolute 0.21 0.00 - 0.70 x10*3/uL    Basophils Absolute 0.01 0.00 - 0.10 x10*3/uL   Renal Function Panel   Result Value Ref Range    Glucose 150 (H) 74  - 99 mg/dL    Sodium 135 (L) 136 - 145 mmol/L    Potassium 3.7 3.5 - 5.3 mmol/L    Chloride 96 (L) 98 - 107 mmol/L    Bicarbonate 32 21 - 32 mmol/L    Anion Gap 11 10 - 20 mmol/L    Urea Nitrogen 15 6 - 23 mg/dL    Creatinine 5.22 (H) 0.50 - 1.30 mg/dL    eGFR 11 (L) >60 mL/min/1.73m*2    Calcium 9.2 8.6 - 10.6 mg/dL    Phosphorus 3.0 2.5 - 4.9 mg/dL    Albumin 2.8 (L) 3.4 - 5.0 g/dL   Magnesium   Result Value Ref Range    Magnesium 1.79 1.60 - 2.40 mg/dL   POCT GLUCOSE   Result Value Ref Range    POCT Glucose 172 (H) 74 - 99 mg/dL   POCT GLUCOSE   Result Value Ref Range    POCT Glucose 243 (H) 74 - 99 mg/dL     *Note: Due to a large number of results and/or encounters for the requested time period, some results have not been displayed. A complete set of results can be found in Results Review.        Assessment & Plan  Pneumonia of both lower lobes due to infectious organism    Assessment: Manolo is a 70 y.o male with past medical history of stage 3 CKD/ESRD on HD TTS with stage 3 CKD s/p failed bilateral kidney transplant now on HD TTS , HFpEF, Metabolic syndrome, Monoclonal antibody of undetermined significance (MGUS) gastroparesis presenting with chest discomfort, cough, and N/V. Patient was found to be febrile, altered, bibasilar infiltrates on CXR, and signs of volume overload with cardiomegaly.     Updates 7/5  [ ] start losartan outpt   [ ] possible discharge after HD   [ ] f/up nephro recs  [ ] transition to PO -> Augmentin   [ ] confirm discontinue tacro  [ ] finished 3 days azithromycin 500 mg daily       Acute Hospital Problems:   ##Bilateral Pneumonia   Lactate wnl, s/p 500 ml bolus, bolus with caution given fluid overload. Procal elevated at 1.01 with negative flu and covid. No other source of infection suspected given no urological source (anuric), abdominal (negative enteritis on CT, negative abd fluid for SBP on imaging, negative abd symptoms), no extremity infection (no limb injury or necrosis seen  on exam, AVF intact and functional). Possible cranial infection but not likely   -> Pending Bcx (7/2), MRSA, sputum culture  -> Day 3/7 abx: Currently on zosyn q8 (7/2 -7/5) and vanc on (7/2-7/4) and azithro on (7/3 -7/5)  ->Received singular dose of zosyn and vanc in ED (7/2)  ->Tylenol prn for pain and fever   - > home regimen Augmentin BID until 7/8   ##C/B Acute Hypoxic Respiratory Failure 2/2 suspected Right parapneumonic effusions vs bilateral pleural effusions  Does not require oxygen at baseline  -> Currently on 3L NC and feeling well, attempt to wean to BL  ##C/B Acute encephalopathy, Resolved  At baseline patient is Aox3 and can ambulate without help.  -> Okay to eat per nurse bedside swallow on 7/2  -> Patient Aox3 on 7/3     ##Volume Overload 2/2 ESRD vs decompensated HFpEF  Volume exam demonstrates 1+ LE edema (laying down), abdominal distension with positive fluid waves and dullness on percussion. CXR (7/2) shows bibasilar infiltrates, mild vascular congestion. CTAP w/ contrast shows worsening pleural effusions, dependent pulmonary edema, atelectasis, and anasarca. Worsening portal HTN with enlarging portal/splenic veins and hepatosplenomegaly. Dilated main pulmonary artery. Findings were compared to previous images  ##Stage 3 CKD/ESRD s/p bilateral failed kidney transplant on HD TTS via Left AVF  Not a current transplant candidate per 7/24 committee note due to multiple hospitalizations. Both transplanted kidney are not functional however patient is currently on tacrolimus 0.5 mg daily and prednisone 5mg daily. Patient is anuric at baseline and dependent on HD for fluid removal. Currently his Cr is 6.77 with a baseline range of 5-9, estimated CrCl is 10 ml/min.    -> Transplant Nephrology Consulted              -> Continue prednisone, Stop Tacrolimus   -> Dialysis Nephrology Consulted, short HD planned on 7/5 morning              ->HD: 7/3  (net 1.43 fl out), 7/4 (net 800 out)  ##Decompensated Heart  failure with preserved Ejection Fraction  Chronic HFpEF with recent echo on 1/25 showing EF of 55%. Concern for decompensation due to cardiomegaly on CTAP. BNP 1.7K on 7/2, last value 2.5K on 4/25. Limited TTE (7/3) Showed LVEF 45-50%, LV global hypokinesis, grade 2 left ventricular diastolic filling w/ increased L atrial pressure, moderately dilated LV cavity, and moderate mitral valve regurg  -> Consult cardiology with goal of concern for new HFrEF from LV hypokinesis and wall abnormality  ##C/B Electrolyte Abnormalities, Resolved  -> Hyperkalemia s/p HyperK cocktail, insulin, and albuterol in ED, 5.7 on admission now 4.8 on 7/3  -> Hyponatremia, 130 on admission now 134 on 7/3     ##Hypertension  Admitted with systolic in 200  -> Continue home regimen: Coreg 37.5 BID, isosorbid mononitrate 30 mg daily, Nifedipine 90 mg BID              -> Monitor systolic >160 or HR <60     ##Pancytopenia  -> Possible hemoconcentration on 7/2 CBC given significant drop on 7/3 CBC labs s/p 500 ml bolus.  ->Monitor platelets     ##Disposition  -> Social work Consulted  -> PT Consulted  -> OT Consulted       Chronic Medical Problems:      ##T2DM  -> Glargine long acting and SSI  -> glucose high 7/3, return patient long-acting to home dose to 20U     ##MSK Pain, Shoulder R >L   Shoulder pain managed with opioids. ED gave 0.5mg dilaudid  -> Held Norco and Lyrica and stopped percocet in the setting of somnolence              -> Resume oxy 5, flexeril, and lyrica once somnolence is r/o  -> Continue Lidocaine patch for both shoulders     ##Gastroparesis  -> continued reglan 5 mg Tid with meals  -> Zofran Prn held, pending ECG read for normal QT     ##GERD  -> continue home protonix 40 mg daily     ##HLD  -> Continue Home Pravastatin        Fluids: Replete PRN  Electrolytes: Replete PRN  Nutrition: Adult diet Renal; Potassium Restricted 2 gm (50mEq); 2 - 3 grams Sodium  Antimicrobials:  piperacillin-tazobactam - 2.25 gram/50 mL  DVT PPX:  Heparin subcutaneous   Bowel Regimen: Miralax PRN  LDA: Right Forearm PIV   Oxygen: RA  Disposition: Home,  Code Status: Full Code  NOK: BEN is daughter Purnima Bashir, [phone]  Secondary contact is  Amalia Zoe, [phone]              Samia Crum MD

## 2025-07-05 NOTE — PROGRESS NOTES
7/5/25 0927 Transitional Care Coordinator Notes:     Transitional Care Coordination Progress Note:  Patient discussed during interdisciplinary rounds.   Team members present: MD and TCC  Plan per Medical/Surgical team: Patient will be scheduled for dialysis today. Team will order a walking pulse ox afterwards. Patient will possibly discharge to home today if there is no supplemental oxygen needs.    Updates:  Patient will discharge to home today. Walking pulse ox completed and no home oxygen needed. Family will provide transportation home.    Payor: [place]  Discharge disposition: home  Potential Barriers: none  ADOD:  2-4 days                 Assessment & Plan  Pneumonia of both lower lobes due to infectious organism              Kristen Omalley RN

## 2025-07-05 NOTE — NURSING NOTE
Patient discharged to home this evening. He verbalized an understanding of the AVS instructions after review with this nurse. His bedside nurse Elvira SKINNER removed his IV. He packed his belongings. Meds to bed prescriptions were delivered from [facility] pharmacy. His daughter's mother transported him home.

## 2025-07-05 NOTE — CARE PLAN
Problem: Pain - Adult  Goal: Verbalizes/displays adequate comfort level or baseline comfort level  Outcome: Progressing     Problem: Safety - Adult  Goal: Free from fall injury  Outcome: Progressing     Problem: Discharge Planning  Goal: Discharge to home or other facility with appropriate resources  Outcome: Progressing     Problem: Chronic Conditions and Co-morbidities  Goal: Patient's chronic conditions and co-morbidity symptoms are monitored and maintained or improved  Outcome: Progressing     Problem: Nutrition  Goal: Nutrient intake appropriate for maintaining nutritional needs  Outcome: Progressing     Problem: Skin  Goal: Decreased wound size/increased tissue granulation at next dressing change  Outcome: Progressing  Goal: Participates in plan/prevention/treatment measures  Outcome: Progressing  Goal: Prevent/manage excess moisture  Outcome: Progressing  Goal: Prevent/minimize sheer/friction injuries  Outcome: Progressing  Goal: Promote/optimize nutrition  Outcome: Progressing  Goal: Promote skin healing  Outcome: Progressing     Problem: Diabetes  Goal: Achieve decreasing blood glucose levels by end of shift  Outcome: Progressing  Goal: Increase stability of blood glucose readings by end of shift  Outcome: Progressing  Goal: Decrease in ketones present in urine by end of shift  Outcome: Progressing  Goal: Maintain electrolyte levels within acceptable range throughout shift  Outcome: Progressing  Goal: Maintain glucose levels >70mg/dl to <250mg/dl throughout shift  Outcome: Progressing  Goal: No changes in neurological exam by end of shift  Outcome: Progressing  Goal: Learn about and adhere to nutrition recommendations by end of shift  Outcome: Progressing  Goal: Vital signs within normal range for age by end of shift  Outcome: Progressing  Goal: Increase self care and/or family involovement by end of shift  Outcome: Progressing  Goal: Receive DSME education by end of shift  Outcome: Progressing      Problem: Pain  Goal: Takes deep breaths with improved pain control throughout the shift  Outcome: Progressing  Goal: Turns in bed with improved pain control throughout the shift  Outcome: Progressing  Goal: Walks with improved pain control throughout the shift  Outcome: Progressing  Goal: Performs ADL's with improved pain control throughout shift  Outcome: Progressing  Goal: Participates in PT with improved pain control throughout the shift  Outcome: Progressing  Goal: Free from opioid side effects throughout the shift  Outcome: Progressing  Goal: Free from acute confusion related to pain meds throughout the shift  Outcome: Progressing   The patient's goals for the shift include Patient will remain safe and free from fall and injury during shift    The clinical goals for the shift include ptr bp systolic does not go high to 200

## 2025-07-06 ENCOUNTER — PHARMACY VISIT (OUTPATIENT)
Dept: PHARMACY | Facility: CLINIC | Age: 70
End: 2025-07-06
Payer: COMMERCIAL

## 2025-07-06 LAB
BACTERIA BLD CULT: NORMAL
BACTERIA BLD CULT: NORMAL

## 2025-07-06 PROCEDURE — RXMED WILLOW AMBULATORY MEDICATION CHARGE

## 2025-07-06 NOTE — DISCHARGE SUMMARY
"Discharge Diagnosis  Pneumonia of both lower lobes due to infectious organism           Issues Requiring Follow-Up  Cardiology Referral for worsening heart failure per echocardiogram    Discharge Meds     Medication List      START taking these medications     amoxicillin-clavulanate 875-125 mg tablet; Commonly known as: Augmentin;   Take 1 tablet by mouth 2 times a day for 3 days.   losartan 25 mg tablet; Commonly known as: Cozaar; Take 1 tablet (25 mg)   by mouth once daily.     CONTINUE taking these medications     BD Luer-Rita Syringe 3 mL 25 x 5/8\" syringe; Generic drug: syringe with   needle; Use to inject epogen.   carvedilol 12.5 mg tablet; Commonly known as: Coreg; Take 3 tablets   (37.5 mg) by mouth 2 times a day. Hold for systolic BP <140 and HR <60.   PATIENT NEEDS TO FOLLOW UP WITH CARDIOLOGY   clobetasol 0.05 % external solution; Commonly known as: Temovate; Apply   topically 2 times a day for 14 days. Use on scalp nightly if needed for   itch   clotrimazole 1 % cream; Commonly known as: Lotrimin; Apply topically 2   times a day.   cyclobenzaprine 10 mg tablet; Commonly known as: Flexeril; Take 0.5   tablets (5 mg) by mouth 2 times a day as needed for muscle spasms for up   to 10 doses.   Deep Sea Nasal 0.65 % nasal spray; Generic drug: sodium chloride;   Administer 1 spray into each nostril 4 times a day as needed for   congestion.   Easy Touch Alcohol Prep Pads; Generic drug: alcohol swabs   fluocinonide 0.05 % ointment; Commonly known as: Lidex; Apply to   affected areas twice daily when active as needed. Use less than 14 days   per month.   Gvoke HypoPen 1-Pack 1 mg/0.2 mL auto-injector; Generic drug: glucagon;   Inject 1 mg into the muscle every 15 minutes if needed (For blood glucose   41 to 70 mg/dL and no IV access).   imiquimod 5 % cream; Commonly known as: Aldara; Use daily for 6 weeks on   penis, right on that white and bumpy area Put a layer about 2 quarter size   wide on that area, very " thin layer, and cover with vaseline   insulin lispro 100 unit/mL pen; Commonly known as: HumaLOG; 4 units with   lunch and dinner plus a sliding scale up to 20 units daily   isosorbide mononitrate ER 30 mg 24 hr tablet; Commonly known as: Imdur;   Take 1 tablet (30 mg) by mouth once daily. Do not crush or chew.   Lantus Solostar U-100 Insulin 100 unit/mL (3 mL) pen; Generic drug:   insulin glargine; 20 units daily   lidocaine 4 % patch; Apply one patch topically for 12 hours on and 12   hours off.   metoclopramide 5 mg tablet; Commonly known as: Reglan; Take 1 tablet (5   mg) by mouth 3 times a day before meals.   naloxone 4 mg/0.1 mL nasal spray; Commonly known as: Narcan; Administer   1 spray (4 mg) into affected nostril(s) if needed for opioid reversal. May   repeat every 2-3 minutes if needed, alternating nostrils, until medical   assistance becomes available.   NIFEdipine ER 90 mg 24 hr tablet; Commonly known as: Adalat CC; Take 1   tablet (90 mg) by mouth 2 times a day. Do not crush, chew, or split.   pantoprazole 40 mg EC tablet; Commonly known as: ProtoNix; Take 1 tablet   (40 mg) by mouth once daily in the morning. Take before meals. Do not   crush, chew, or split. Do not start before January 22, 2024.   polyethylene glycol 17 gram/dose powder; Commonly known as: Glycolax,   Miralax; Mix 17 g of powder and drink once daily. Do not fill before June 8, 2025.   pravastatin 10 mg tablet; Commonly known as: Pravachol; Take 1 tablet   (10 mg) by mouth once daily at bedtime.   predniSONE 5 mg tablet; Commonly known as: Deltasone; Take 1 tablet (5   mg) by mouth once daily.   pregabalin 25 mg capsule; Commonly known as: Lyrica; Take 1 capsule (25   mg) by mouth once daily at bedtime.   Renal Caps 1 mg capsule; Generic drug: vitamin B complex-vitamin C-folic   acid; Take 1 capsule by mouth once daily.   sevelamer carbonate 800 mg tablet; Commonly known as: Renvela; Take 1   tablet (800 mg) by mouth 3 times a day  "before meals. Swallow tablet whole;   do not crush, break, or chew.   True Metrix Glucose Test Strip; Generic drug: blood sugar diagnostic;   For BG check 4x/day   TRUEdraw Lancing Device misc; Generic drug: lancing device; use as   directed   TRUEplus Lancets 33 gauge misc; Generic drug: lancets; 1 each 3 times a   day.   TRUEplus Pen Needle 32 gauge x 5/32\" needle; Generic drug: pen needle,   diabetic; Use 4 a day as directed     STOP taking these medications     HYDROcodone-acetaminophen 5-325 mg tablet; Commonly known as: Norco   oxyCODONE-acetaminophen 5-325 mg tablet; Commonly known as: Percocet   tacrolimus 0.1 % ointment; Commonly known as: Protopic   tacrolimus 0.5 mg capsule; Commonly known as: Prograf       Test Results Pending At Discharge  Pending Labs       No current pending labs.            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 Presented to us with chest discomfort, cough, and N/V. Patient was found to be febrile, acute hypoxic respiratory failure (AHRF), signs of volume overload on exam and imaging, altered, and electrolytes abnormalities. Patient was admitted for concern for pneumonia and management of volume overload.     At [facility] ED, Patient acute fever and hypoxia was managed with tylenol and placed on 6L NC; antibiotics was also started and small fluids given. Imaging showed cardiomegaly with signs of volume overload including pleural effusions, pulmonary edema which has worsened from past imaging. Patient was admitted for concern of infection (fever, procal, AMS, and immunosuppression) in the setting of volume overload.     On the floor, Patient received dialysis at midnight, AMS and electrolytes abnormalities resolved on 7/3. Transplant nephrology following and " recommended discontinuing tacrolimus due to failure of bilateral renal transplants, however continue prednisone. Patient received 3 dialysis sessions with no complications to reduce is volume overload status. Discharged with Augmentin to finish his 7 day course of antibiotics.    Repeat echocardiogram was performed due to concerned of decompensated heart failure and showed a decreased LVEF (40-50% from 55%) with improved diastolic function (grade 2 from grade 3). Cardiology consulted started losartan given his elevated blood pressures during his stay and recommend follow-up with his outpatient cardiologists due to his relative stable nature of his heart failure.     Pertinent Physical Exam At Time of Discharge  Physical Exam  Constitutional:       General: He seems well and not in acute distress  HENT:      Head: Normocephalic and atraumatic.      Mouth/Throat:      Eyes:      General: No scleral icterus.     Extraocular Movements: Extraocular movements intact.   Cardiovascular:      Rate and Rhythm: NORMAL      Heart sounds: No murmur heard.     No gallop  Pulmonary:      Effort: No respiratory distress.      Breath sounds: breath sounds normal     Comments: On RA  Abdominal:      General: There is distension.      Palpations: There is no mass.      Tenderness: There is no right CVA tenderness, left CVA tenderness, guarding or rebound.      Hernia: No hernia is present.   Musculoskeletal:         General: Tenderness (Bilateral Shoulder Tenderness) present.      Cervical back: Limited range of motion in bilateral upper arm (R > L)     Comments: Left AVF for HD   Skin:     General: Skin is warm.      Capillary Refill: Capillary refill takes 2 to 3 seconds.      Coloration: Skin is not jaundiced.      Findings: No bruising.   Neurological:      Mental Status: He is currently not disoriented, AOx3    Outpatient Follow-Up  Future Appointments   Date Time Provider Department Center   7/9/2025  8:40 AM Melia Qiu  JIMMY [id number] [facility]   7/30/2025 10:30 AM Bailey SIMPSON MD [id number] [facility]   8/13/2025 10:15 AM Vinicius Araiza MD [id number] [facility]   8/25/2025 11:00 AM Daxa Cote DPM [id number] [facility]   9/8/2025  1:40 PM Chasidy Cabrales, APRN-CNP [id number] [facility]   11/12/2025  9:20 AM Estella Quinonez MD [id number] [facility]   4/6/2026 10:20 AM Cali Mai MD [id number] [facility]   4/29/2026  2:30 PM Elma Hutton, APRN-CNP, [facility] [id number] [facility]         Aamir Nuñez MD

## 2025-07-07 ENCOUNTER — PATIENT OUTREACH (OUTPATIENT)
Dept: CARE COORDINATION | Facility: CLINIC | Age: 70
End: 2025-07-07
Payer: COMMERCIAL

## 2025-07-07 NOTE — PROGRESS NOTES
Outreach call to patient to support a smooth transition of care from recent admission. Unable to reach patient or lvm. Will continue to follow through transition period.    Judith Irvin RN, [facility] [place]  Phone [phone]

## 2025-07-08 ASSESSMENT — ENCOUNTER SYMPTOMS
AGITATION: 0
WEAKNESS: 0
EYE REDNESS: 0
JOINT SWELLING: 0
COUGH: 0
UNEXPECTED WEIGHT CHANGE: 0
ABDOMINAL PAIN: 0
SORE THROAT: 0
DIARRHEA: 0
POLYPHAGIA: 0
DYSPHORIC MOOD: 0
NAUSEA: 0
CONSTIPATION: 0
BLOOD IN STOOL: 0
VOMITING: 0
TROUBLE SWALLOWING: 0
POLYDIPSIA: 0
CHOKING: 0
DYSURIA: 0
CONFUSION: 0

## 2025-07-08 NOTE — PROGRESS NOTES
Subjective   Patient ID: Manolo Bashir is a 69 y.o. male who presents for   Last OV with me was on Dec 2024.     HPI  Mr. Bashir is a 66 y/o AAM with h/o ESRD secondary to HTN, s/p 2 kidney transplants, with the most recent one back in 2013 (on immunosuppression), h/o DMII, h/o hep C ( treated with 12 wks of Harvoni and has SVR) and h/o prostate ca (s/p radical prostatectomy).   Pt is here today with his friend, Amalia.     Back in Feb 2023, pt had AUS placed and he developed complications with an infection. It was then removed in end of March 2023. During those months, pt experienced watery diarrhea.      He does have h/o diarrhea (secondary to h/o hemicolectomy), but it is usually controlled with Loperamide.   However, with his current symptoms, he was having abd pain and watery diarrhea (5-10x/day) and having nocturnal episodes. He was given abx for his infected device and after completing therapy, he has been doing well.     At a previous  OV with me in 5/2023,  he was doing much better.  His BM reverted back to normal and he denied any abd pain. He never obtained the stool orders that was ordered by his nephrologist.   He denied an f/c, n/v, or blood in stool. He did have labs drawn early May 2023 revealing stable CBC, CMP and lipase.      He also had CT abd/p (without contrast) on 5/7/23 revealing:       Abdomen-Pelvis  1. Soft tissue stranding at the urinary bladder and perinephric fat stranding at the left iliac fossa renal transplant. Please correlate with laboratory values/urinalysis to exclude superimposed cystitis and pyelonephritis.  2. Interval removal of the artificial urinary sphincter reservoir  with focal hyperdensity at the left rectus abdominal muscle and overlying soft tissues, may represent small hematoma.  3. Mild colonic diverticulosis.    Previous GI workup includes:     EGD in May 2018 - normal esophagus, bleeding erosive gastropathy (bx showed chronic gastritis, neg H.pylori, neg for int  "metaplasia), normal duodenum.     Screening Colonoscopy on April 2017 - perianal skin tags, normal ileum, patent end to end ileo colonic anastamosis with healthy mucosa, diverticulosis and non bleeding ext hemorrhoids. No specimens collected.    At a previous OV, we did order a screening colonoscopy because he was due back in 2022.  He completed it on 8/2023.  It showed decreased sphincter, patent side to side ileocolonic anast with healthy mucosa, normal ileum, diverticulosis, non bleeding external hemorrhoids.  No specimens collected.  He will be due in 7 yrs (2030).    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  Pt is now back on dialysis.      Back in 4/22/24, he came in for an OV for periumbilcal abd pain that was occurring for 3 months.  Pt did have cholecystecomy  approx 3 months ago for his chronic RUQ pain that radiates to his back, but did not seem to \"solve the problem\". He was having this persistent \"pulling pain\" around upper abdominal region down to  belly button and having excess gas, with nausea/vomiting in the morning for past month.  Pt admitted to throwing up \"food and acid\".  He denied any hematemesis, melena, or any hematochezia.    Last CT abd (no IV) was on 11/15/23 done for right flank pain.    Showed left pelvis transplant kidney, diffuse anasarca, mild bladder wall thickening, sm HH, large amount of gastric debris.    We presumed his symptoms  may be from gastroparesis. We started him on Reglan - 5mg before each meal and bedtime and we ordered a GES ,which did was done in April 2024 confirming he has gastroparesis.     Pt was doing better after starting Reglan. He was not having much abd pain and his n/v episodes has subsided.   At a previous visit, we had him continue his Reglan 5mg before dinner and at bedtime, as well as continue his daily PPI.  I also referred hi to a dietician to discuss diabetic diet and have him do close follow up with endocrine " regarding his diabetes management.     Back in Dec 2024, which was his last OV with me,  he was feeling well and denied any nausea or abdominal pain at that time.  We then had him try stopping Reglan, but continue his daily PPI. Pt was doing fairly well, then on Feb 2025, he corresponded with me via [employer] and wanted to restart Reglan again. He was suppose to follow up in March 2025 but pt was hospitalized multiple times and had to cancel.   Pt was just discharged from hospital earlier this week after being admitted for pneumonia.    Pt is back today for follow up. Pt still on Reglan 5 mg BID and seems to control his nausea most of the time. He just completed his Augmentin yesterday. He is having watery/loose stools, 3-4x/ day ever since he was discharged and placed on abx.       Review of Systems   Constitutional:  Negative for unexpected weight change.   HENT:  Negative for sore throat and trouble swallowing.    Eyes:  Negative for redness.   Respiratory:  Negative for cough and choking.    Cardiovascular:  Negative for chest pain.   Gastrointestinal:  Negative for abdominal pain, blood in stool, constipation, diarrhea, nausea and vomiting.   Endocrine: Negative for polydipsia and polyphagia.   Genitourinary:  Negative for dysuria.   Musculoskeletal:  Negative for joint swelling.   Skin:  Negative for rash.   Neurological:  Negative for weakness.   Psychiatric/Behavioral:  Negative for agitation, confusion and dysphoric mood.        Objective   Visit Vitals  /82   Pulse 75   Temp 36.6 °C (97.9 °F)   Wt 68.2 kg (150 lb 4.8 oz)   SpO2 98%   BMI 22.85 kg/m²   Smoking Status Never   BSA 1.81 m²      Physical Exam  Vitals reviewed.   Constitutional:       Appearance: He is not ill-appearing or toxic-appearing.   HENT:      Head: Normocephalic and atraumatic.      Mouth/Throat:      Pharynx: Oropharynx is clear. No oropharyngeal exudate.   Eyes:      General: No scleral icterus.  Cardiovascular:      Rate and  Rhythm: Normal rate and regular rhythm.      Heart sounds: Normal heart sounds.   Pulmonary:      Effort: Pulmonary effort is normal. No respiratory distress.   Abdominal:      General: Bowel sounds are normal. There is no distension.      Palpations: Abdomen is soft.      Tenderness: There is no abdominal tenderness. There is no guarding or rebound.      Comments: Bruising on RLQ from his insulin injections   Musculoskeletal:         General: No deformity.      Cervical back: Neck supple.   Lymphadenopathy:      Cervical: No cervical adenopathy.   Skin:     Findings: No bruising.   Neurological:      Mental Status: He is alert. Mental status is at baseline.   Psychiatric:         Mood and Affect: Mood normal.         Behavior: Behavior normal.       Assessment/Plan   1) Acute diarrhea -  May be secondary to recent abx. Just finished Augmentin yesterday. If sx continue, will have pt tested for c.diff.     2) h/o upper abd pain with n/v -  Currently stable  S/p GES on April 2024 revealing gastroparesis. Continue Reglan 5 mg BID  Continue current PPI dosing (Pantoprazole 40 mg daily).    3) h/o adenomatous polyps -  S/p colonoscopy on 8/2023 - No polyps found.  Repeat surveillance in 7 yrs (2030)     Follow up in 4-6 months, or sooner if needed

## 2025-07-09 ENCOUNTER — OFFICE VISIT (OUTPATIENT)
Dept: GASTROENTEROLOGY | Facility: HOSPITAL | Age: 70
End: 2025-07-09
Payer: COMMERCIAL

## 2025-07-09 VITALS
HEART RATE: 75 BPM | SYSTOLIC BLOOD PRESSURE: 151 MMHG | DIASTOLIC BLOOD PRESSURE: 82 MMHG | BODY MASS INDEX: 22.85 KG/M2 | TEMPERATURE: 97.9 F | OXYGEN SATURATION: 98 % | WEIGHT: 150.3 LBS

## 2025-07-09 DIAGNOSIS — R19.7 WATERY DIARRHEA: Primary | ICD-10-CM

## 2025-07-09 PROCEDURE — 3079F DIAST BP 80-89 MM HG: CPT | Performed by: PHYSICIAN ASSISTANT

## 2025-07-09 PROCEDURE — 99213 OFFICE O/P EST LOW 20 MIN: CPT | Performed by: PHYSICIAN ASSISTANT

## 2025-07-09 PROCEDURE — 4010F ACE/ARB THERAPY RXD/TAKEN: CPT | Performed by: PHYSICIAN ASSISTANT

## 2025-07-09 PROCEDURE — 3052F HG A1C>EQUAL 8.0%<EQUAL 9.0%: CPT | Performed by: PHYSICIAN ASSISTANT

## 2025-07-09 PROCEDURE — 1036F TOBACCO NON-USER: CPT | Performed by: PHYSICIAN ASSISTANT

## 2025-07-09 PROCEDURE — 1111F DSCHRG MED/CURRENT MED MERGE: CPT | Performed by: PHYSICIAN ASSISTANT

## 2025-07-09 PROCEDURE — 99212 OFFICE O/P EST SF 10 MIN: CPT | Performed by: PHYSICIAN ASSISTANT

## 2025-07-09 PROCEDURE — 3077F SYST BP >= 140 MM HG: CPT | Performed by: PHYSICIAN ASSISTANT

## 2025-07-09 PROCEDURE — 1125F AMNT PAIN NOTED PAIN PRSNT: CPT | Performed by: PHYSICIAN ASSISTANT

## 2025-07-09 SDOH — ECONOMIC STABILITY: FOOD INSECURITY: WITHIN THE PAST 12 MONTHS, THE FOOD YOU BOUGHT JUST DIDN'T LAST AND YOU DIDN'T HAVE MONEY TO GET MORE.: NEVER TRUE

## 2025-07-09 SDOH — ECONOMIC STABILITY: FOOD INSECURITY: WITHIN THE PAST 12 MONTHS, YOU WORRIED THAT YOUR FOOD WOULD RUN OUT BEFORE YOU GOT MONEY TO BUY MORE.: NEVER TRUE

## 2025-07-09 ASSESSMENT — LIFESTYLE VARIABLES
SKIP TO QUESTIONS 9-10: 1
HOW OFTEN DO YOU HAVE A DRINK CONTAINING ALCOHOL: NEVER
HOW OFTEN DO YOU HAVE SIX OR MORE DRINKS ON ONE OCCASION: NEVER
HOW MANY STANDARD DRINKS CONTAINING ALCOHOL DO YOU HAVE ON A TYPICAL DAY: PATIENT DOES NOT DRINK
AUDIT-C TOTAL SCORE: 0

## 2025-07-09 ASSESSMENT — PAIN SCALES - GENERAL: PAINLEVEL_OUTOF10: 4

## 2025-07-11 DIAGNOSIS — R91.8 GROUND GLASS OPACITY PRESENT ON IMAGING OF LUNG: ICD-10-CM

## 2025-07-11 PROCEDURE — RXMED WILLOW AMBULATORY MEDICATION CHARGE

## 2025-07-12 ENCOUNTER — PHARMACY VISIT (OUTPATIENT)
Dept: PHARMACY | Facility: CLINIC | Age: 70
End: 2025-07-12
Payer: COMMERCIAL

## 2025-07-16 ENCOUNTER — OFFICE VISIT (OUTPATIENT)
Dept: GASTROENTEROLOGY | Facility: HOSPITAL | Age: 70
End: 2025-07-16
Payer: COMMERCIAL

## 2025-07-16 VITALS
DIASTOLIC BLOOD PRESSURE: 80 MMHG | HEART RATE: 73 BPM | WEIGHT: 153.3 LBS | SYSTOLIC BLOOD PRESSURE: 161 MMHG | TEMPERATURE: 98.1 F | BODY MASS INDEX: 23.31 KG/M2 | OXYGEN SATURATION: 98 %

## 2025-07-16 DIAGNOSIS — R14.0 ABDOMINAL BLOATING: Primary | ICD-10-CM

## 2025-07-16 DIAGNOSIS — R14.1 GAS PAIN: ICD-10-CM

## 2025-07-16 PROCEDURE — 3077F SYST BP >= 140 MM HG: CPT | Performed by: PHYSICIAN ASSISTANT

## 2025-07-16 PROCEDURE — 1125F AMNT PAIN NOTED PAIN PRSNT: CPT | Performed by: PHYSICIAN ASSISTANT

## 2025-07-16 PROCEDURE — 3079F DIAST BP 80-89 MM HG: CPT | Performed by: PHYSICIAN ASSISTANT

## 2025-07-16 PROCEDURE — 3052F HG A1C>EQUAL 8.0%<EQUAL 9.0%: CPT | Performed by: PHYSICIAN ASSISTANT

## 2025-07-16 PROCEDURE — 99213 OFFICE O/P EST LOW 20 MIN: CPT | Performed by: PHYSICIAN ASSISTANT

## 2025-07-16 PROCEDURE — 4010F ACE/ARB THERAPY RXD/TAKEN: CPT | Performed by: PHYSICIAN ASSISTANT

## 2025-07-16 PROCEDURE — 1111F DSCHRG MED/CURRENT MED MERGE: CPT | Performed by: PHYSICIAN ASSISTANT

## 2025-07-16 PROCEDURE — 1036F TOBACCO NON-USER: CPT | Performed by: PHYSICIAN ASSISTANT

## 2025-07-16 PROCEDURE — 99212 OFFICE O/P EST SF 10 MIN: CPT | Performed by: PHYSICIAN ASSISTANT

## 2025-07-16 PROCEDURE — 1159F MED LIST DOCD IN RCRD: CPT | Performed by: PHYSICIAN ASSISTANT

## 2025-07-16 SDOH — ECONOMIC STABILITY: FOOD INSECURITY: WITHIN THE PAST 12 MONTHS, YOU WORRIED THAT YOUR FOOD WOULD RUN OUT BEFORE YOU GOT MONEY TO BUY MORE.: NEVER TRUE

## 2025-07-16 SDOH — ECONOMIC STABILITY: FOOD INSECURITY: WITHIN THE PAST 12 MONTHS, THE FOOD YOU BOUGHT JUST DIDN'T LAST AND YOU DIDN'T HAVE MONEY TO GET MORE.: NEVER TRUE

## 2025-07-16 ASSESSMENT — ENCOUNTER SYMPTOMS
BLOOD IN STOOL: 0
JOINT SWELLING: 0
DIARRHEA: 0
WEAKNESS: 0
DYSURIA: 0
EYE REDNESS: 0
NAUSEA: 0
ABDOMINAL PAIN: 1
DYSPHORIC MOOD: 0
CONFUSION: 0
UNEXPECTED WEIGHT CHANGE: 0
SORE THROAT: 0
AGITATION: 0
CONSTIPATION: 0
CHOKING: 0
COUGH: 0
POLYPHAGIA: 0
ABDOMINAL DISTENTION: 1
VOMITING: 0
POLYDIPSIA: 0
TROUBLE SWALLOWING: 0

## 2025-07-16 ASSESSMENT — LIFESTYLE VARIABLES
HOW OFTEN DO YOU HAVE SIX OR MORE DRINKS ON ONE OCCASION: NEVER
HOW MANY STANDARD DRINKS CONTAINING ALCOHOL DO YOU HAVE ON A TYPICAL DAY: PATIENT DOES NOT DRINK
AUDIT-C TOTAL SCORE: 0
SKIP TO QUESTIONS 9-10: 1
HOW OFTEN DO YOU HAVE A DRINK CONTAINING ALCOHOL: NEVER

## 2025-07-16 ASSESSMENT — PAIN SCALES - GENERAL: PAINLEVEL_OUTOF10: 10-WORST PAIN EVER

## 2025-07-16 NOTE — PROGRESS NOTES
Subjective   Patient ID: Manolo Bashir is a 69 y.o. male who presents for   Last OV with me was on July 9th 2025.    HPI  Mr. Bashir is a 68 y/o AAM with h/o ESRD secondary to HTN, s/p 2 kidney transplants, with the most recent one back in 2013 (on immunosuppression), h/o DMII, h/o hep C ( treated with 12 wks of Harvoni and has SVR) and h/o prostate ca (s/p radical prostatectomy).   Pt is here today with his friend, Amalia.     Back in Feb 2023, pt had AUS placed and he developed complications with an infection. It was then removed in end of March 2023. During those months, pt experienced watery diarrhea.      He does have h/o diarrhea (secondary to h/o hemicolectomy), but it is usually controlled with Loperamide.   However, with his current symptoms, he was having abd pain and watery diarrhea (5-10x/day) and having nocturnal episodes. He was given abx for his infected device and after completing therapy, he has been doing well.     At a previous  OV with me in 5/2023,  he was doing much better.  His BM reverted back to normal and he denied any abd pain. He never obtained the stool orders that was ordered by his nephrologist.   He denied an f/c, n/v, or blood in stool. He did have labs drawn early May 2023 revealing stable CBC, CMP and lipase.      He also had CT abd/p (without contrast) on 5/7/23 revealing:       Abdomen-Pelvis  1. Soft tissue stranding at the urinary bladder and perinephric fat stranding at the left iliac fossa renal transplant. Please correlate with laboratory values/urinalysis to exclude superimposed cystitis and pyelonephritis.  2. Interval removal of the artificial urinary sphincter reservoir  with focal hyperdensity at the left rectus abdominal muscle and overlying soft tissues, may represent small hematoma.  3. Mild colonic diverticulosis.    Previous GI workup includes:     EGD in May 2018 - normal esophagus, bleeding erosive gastropathy (bx showed chronic gastritis, neg H.pylori, neg for  "int metaplasia), normal duodenum.     Screening Colonoscopy on April 2017 - perianal skin tags, normal ileum, patent end to end ileo colonic anastamosis with healthy mucosa, diverticulosis and non bleeding ext hemorrhoids. No specimens collected.    At a previous OV, we did order a screening colonoscopy because he was due back in 2022.  He completed it on 8/2023.  It showed decreased sphincter, patent side to side ileocolonic anast with healthy mucosa, normal ileum, diverticulosis, non bleeding external hemorrhoids.  No specimens collected.  He will be due in 7 yrs (2030).    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  Pt is now back on dialysis.      Back in 4/22/24, he came in for an OV for periumbilcal abd pain that was occurring for 3 months.  Pt did have cholecystecomy  approx 3 months ago for his chronic RUQ pain that radiates to his back, but did not seem to \"solve the problem\". He was having this persistent \"pulling pain\" around upper abdominal region down to  belly button and having excess gas, with nausea/vomiting in the morning for past month.  Pt admitted to throwing up \"food and acid\".  He denied any hematemesis, melena, or any hematochezia.    Last CT abd (no IV) was on 11/15/23 done for right flank pain.    Showed left pelvis transplant kidney, diffuse anasarca, mild bladder wall thickening, sm HH, large amount of gastric debris.    We presumed his symptoms  may be from gastroparesis. We started him on Reglan - 5mg before each meal and bedtime and we ordered a GES ,which did was done in April 2024 confirming he has gastroparesis.     Pt was doing better after starting Reglan. He was not having much abd pain and his n/v episodes has subsided.   At a previous visit, we had him continue his Reglan 5mg before dinner and at bedtime, as well as continue his daily PPI.  I also referred hi to a dietician to discuss diabetic diet and have him do close follow up with endocrine " "regarding his diabetes management.     Back in Dec 2024, which was his last OV with me,  he was feeling well and denied any nausea or abdominal pain at that time.  We then had him try stopping Reglan, but continue his daily PPI. Pt was doing fairly well, then on Feb 2025, he corresponded with me via [employer] and wanted to restart Reglan again. He was suppose to follow up in March 2025 but pt was hospitalized multiple times and had to cancel.   Pt was just discharged from hospital earlier this week after being admitted for pneumonia.    Pt's last OV with me was on July 9th 2025.  Pt still on Reglan 5 mg BID and seems to control his nausea most of the time. He just completed his Augmentin yesterday. He is having watery/loose stools, 3-4x/ day ever since he was discharged and placed on abx.  Pt was going to monitor his sx. It is possible he is having some diarrhea secondary to his abx.  If it continued, I asked him to update me       Pt is back today because he feels \"bloated and gassy\". Pt able to pass gas and had a soft BM today. (His diarrhea improved and only having 1-2 soft/loose BM daily ) Pt feels that his stomach \"feels weird and sick\".  He vomited this morning and feels a little better.      Review of Systems   Constitutional:  Negative for unexpected weight change.   HENT:  Negative for sore throat and trouble swallowing.    Eyes:  Negative for redness.   Respiratory:  Negative for cough and choking.    Cardiovascular:  Negative for chest pain.   Gastrointestinal:  Positive for abdominal distention (feels gassy and bloated) and abdominal pain (abdominal upset). Negative for blood in stool, constipation, diarrhea, nausea and vomiting.   Endocrine: Negative for polydipsia and polyphagia.   Genitourinary:  Negative for dysuria.   Musculoskeletal:  Negative for joint swelling.   Skin:  Negative for rash.   Neurological:  Negative for weakness.   Psychiatric/Behavioral:  Negative for agitation, confusion and " dysphoric mood.        Objective   Visit Vitals  /80   Pulse 73   Temp 36.7 °C (98.1 °F)   Wt 69.5 kg (153 lb 4.8 oz)   SpO2 98%   BMI 23.31 kg/m²   Smoking Status Never   BSA 1.83 m²      Physical Exam  Vitals reviewed.   Constitutional:       Appearance: He is not ill-appearing or toxic-appearing.   HENT:      Head: Normocephalic and atraumatic.      Mouth/Throat:      Pharynx: Oropharynx is clear. No oropharyngeal exudate.     Eyes:      General: No scleral icterus.      Cardiovascular:      Rate and Rhythm: Normal rate and regular rhythm.      Heart sounds: Normal heart sounds.   Pulmonary:      Effort: Pulmonary effort is normal. No respiratory distress.   Abdominal:      General: Bowel sounds are normal. There is no distension.      Palpations: Abdomen is soft.      Tenderness: There is no abdominal tenderness. There is no guarding or rebound.      Comments: Bruising on RLQ from his insulin injections     Musculoskeletal:         General: No deformity.      Cervical back: Neck supple.   Lymphadenopathy:      Cervical: No cervical adenopathy.     Skin:     Findings: No bruising.     Neurological:      Mental Status: He is alert. Mental status is at baseline.     Psychiatric:         Mood and Affect: Mood normal.         Behavior: Behavior normal.       Assessment/Plan   1) Abdominal bloating and excess gas x 1 day -  Feels mildly better after having vomiting episode this morning.   He did not have any overt s/sx of obstruction. His abd exam reveals positive bowel sounds, soft, NT and no guarding or rebound.  Possible Ileus ?   Will give pt some IB Guard samples. If still have excess gas, recommended pt to obtain OTC simethicone.    2) h/o acute diarrhea -  Has resolved. May be abx associated. Just finished Augmentin for pneumonia.    3) h/o chronic upper abd pain with n/v -  Currently stable  S/p GES on April 2024 revealing gastroparesis. Continue Reglan 5 mg BID  Continue current PPI dosing (Pantoprazole  40 mg daily).    4) h/o adenomatous polyps -  S/p colonoscopy on 8/2023 - No polyps found.  Repeat surveillance in 7 yrs (2030)     Follow up in 4-6 months, or sooner if needed

## 2025-07-18 DIAGNOSIS — R91.8 GROUND GLASS OPACITY PRESENT ON IMAGING OF LUNG: ICD-10-CM

## 2025-07-19 DIAGNOSIS — N18.6 ESRD (END STAGE RENAL DISEASE) (MULTI): ICD-10-CM

## 2025-07-21 ENCOUNTER — PATIENT MESSAGE (OUTPATIENT)
Dept: GASTROENTEROLOGY | Facility: HOSPITAL | Age: 70
End: 2025-07-21
Payer: COMMERCIAL

## 2025-07-21 DIAGNOSIS — I50.32 CHRONIC HEART FAILURE WITH PRESERVED EJECTION FRACTION: ICD-10-CM

## 2025-07-21 DIAGNOSIS — K21.9 GASTROESOPHAGEAL REFLUX DISEASE WITHOUT ESOPHAGITIS: ICD-10-CM

## 2025-07-21 PROCEDURE — RXMED WILLOW AMBULATORY MEDICATION CHARGE

## 2025-07-21 RX ORDER — PANTOPRAZOLE SODIUM 40 MG/1
40 TABLET, DELAYED RELEASE ORAL
Qty: 30 TABLET | Refills: 4 | Status: SHIPPED | OUTPATIENT
Start: 2025-07-21

## 2025-07-22 ENCOUNTER — PHARMACY VISIT (OUTPATIENT)
Dept: PHARMACY | Facility: CLINIC | Age: 70
End: 2025-07-22
Payer: COMMERCIAL

## 2025-07-23 RX ORDER — CARVEDILOL 12.5 MG/1
37.5 TABLET ORAL 2 TIMES DAILY
Qty: 180 TABLET | Refills: 11 | Status: SHIPPED | OUTPATIENT
Start: 2025-07-23 | End: 2026-07-23

## 2025-07-24 ENCOUNTER — PHARMACY VISIT (OUTPATIENT)
Dept: PHARMACY | Facility: CLINIC | Age: 70
End: 2025-07-24
Payer: COMMERCIAL

## 2025-07-24 PROCEDURE — RXMED WILLOW AMBULATORY MEDICATION CHARGE

## 2025-07-25 DIAGNOSIS — R91.8 GROUND GLASS OPACITY PRESENT ON IMAGING OF LUNG: ICD-10-CM

## 2025-07-29 DIAGNOSIS — R06.02 SHORTNESS OF BREATH: ICD-10-CM

## 2025-07-29 DIAGNOSIS — N18.4 TYPE 2 DIABETES MELLITUS WITH STAGE 4 CHRONIC KIDNEY DISEASE, WITH LONG-TERM CURRENT USE OF INSULIN (MULTI): ICD-10-CM

## 2025-07-29 DIAGNOSIS — Z79.4 TYPE 2 DIABETES MELLITUS WITH STAGE 4 CHRONIC KIDNEY DISEASE, WITH LONG-TERM CURRENT USE OF INSULIN (MULTI): ICD-10-CM

## 2025-07-29 DIAGNOSIS — E11.22 TYPE 2 DIABETES MELLITUS WITH STAGE 4 CHRONIC KIDNEY DISEASE, WITH LONG-TERM CURRENT USE OF INSULIN (MULTI): ICD-10-CM

## 2025-07-29 PROCEDURE — RXMED WILLOW AMBULATORY MEDICATION CHARGE

## 2025-07-29 RX ORDER — INSULIN GLARGINE 100 [IU]/ML
INJECTION, SOLUTION SUBCUTANEOUS
Qty: 15 ML | Refills: 1 | Status: SHIPPED | OUTPATIENT
Start: 2025-07-29

## 2025-07-30 ENCOUNTER — OFFICE VISIT (OUTPATIENT)
Dept: SLEEP MEDICINE | Facility: HOSPITAL | Age: 70
End: 2025-07-30
Payer: COMMERCIAL

## 2025-07-30 VITALS
WEIGHT: 149 LBS | BODY MASS INDEX: 22.66 KG/M2 | DIASTOLIC BLOOD PRESSURE: 88 MMHG | OXYGEN SATURATION: 97 % | SYSTOLIC BLOOD PRESSURE: 179 MMHG | TEMPERATURE: 97.6 F | HEART RATE: 78 BPM

## 2025-07-30 DIAGNOSIS — R91.8 ABNORMAL CT SCAN OF LUNG: Primary | ICD-10-CM

## 2025-07-30 DIAGNOSIS — G47.33 OSA (OBSTRUCTIVE SLEEP APNEA): ICD-10-CM

## 2025-07-30 PROCEDURE — RXMED WILLOW AMBULATORY MEDICATION CHARGE

## 2025-07-30 PROCEDURE — 3079F DIAST BP 80-89 MM HG: CPT | Performed by: STUDENT IN AN ORGANIZED HEALTH CARE EDUCATION/TRAINING PROGRAM

## 2025-07-30 PROCEDURE — 99214 OFFICE O/P EST MOD 30 MIN: CPT | Performed by: STUDENT IN AN ORGANIZED HEALTH CARE EDUCATION/TRAINING PROGRAM

## 2025-07-30 PROCEDURE — 1159F MED LIST DOCD IN RCRD: CPT | Performed by: STUDENT IN AN ORGANIZED HEALTH CARE EDUCATION/TRAINING PROGRAM

## 2025-07-30 PROCEDURE — 1126F AMNT PAIN NOTED NONE PRSNT: CPT | Performed by: STUDENT IN AN ORGANIZED HEALTH CARE EDUCATION/TRAINING PROGRAM

## 2025-07-30 PROCEDURE — 1111F DSCHRG MED/CURRENT MED MERGE: CPT | Performed by: STUDENT IN AN ORGANIZED HEALTH CARE EDUCATION/TRAINING PROGRAM

## 2025-07-30 PROCEDURE — 4010F ACE/ARB THERAPY RXD/TAKEN: CPT | Performed by: STUDENT IN AN ORGANIZED HEALTH CARE EDUCATION/TRAINING PROGRAM

## 2025-07-30 PROCEDURE — 3077F SYST BP >= 140 MM HG: CPT | Performed by: STUDENT IN AN ORGANIZED HEALTH CARE EDUCATION/TRAINING PROGRAM

## 2025-07-30 PROCEDURE — 3052F HG A1C>EQUAL 8.0%<EQUAL 9.0%: CPT | Performed by: STUDENT IN AN ORGANIZED HEALTH CARE EDUCATION/TRAINING PROGRAM

## 2025-07-30 RX ORDER — ISOSORBIDE MONONITRATE 30 MG/1
30 TABLET, EXTENDED RELEASE ORAL DAILY
Qty: 30 TABLET | Refills: 0 | Status: SHIPPED | OUTPATIENT
Start: 2025-07-30 | End: 2025-09-02

## 2025-07-30 ASSESSMENT — PAIN SCALES - GENERAL: PAINLEVEL_OUTOF10: 0-NO PAIN

## 2025-07-30 NOTE — PATIENT INSTRUCTIONS
[facility]  [facility]  [address]  [facility] [address]  [phone]       NAME: Manolo Bashir   DATE: 7/30/2025     Your Sleep Provider Today: Bailey Vance MD  Your Primary Care Physician: Elma Hutton, APRN-CNP, DNP   Your Referring Provider: Norah Flynn,*    DIAGNOSIS:   1. Abnormal CT scan of lung  CT chest wo IV contrast    Follow Up In Adult Sleep Medicine      2. DEISY (obstructive sleep apnea)  In-Center Sleep Study (Sleep Provider Only)    Follow Up In Adult Sleep Medicine          Thank you for coming to the Sleep Medicine Clinic today! Your sleep medicine provider today was: Bailey Vance MD Below is a summary of your treatment plan, other important information, and our contact numbers:      TREATMENT PLAN     Orders Placed:    Orders Placed This Encounter   Procedures    CT chest wo IV contrast    In-Center Sleep Study (Sleep Provider Only)     Referrals:  Follow-up Appointment:   No follow-ups on file.    IMPORTANT INFORMATION     Call 911 for medical emergencies.  Our offices are generally open from Monday-Friday, 9 am - 5 pm.  If you need to get in touch with me, you may either call me and my team (number is below) or you can use [employer].  If a referral for a test, for CPAP, or for another specialist was made, and you have not heard about scheduling this within a week, please call scheduling at [phone]).  If you are unable to make your appointment for clinic or an overnight study, kindly call the office at least 48 hours in advance to cancel and reschedule.  If you are on CPAP, please bring your device's card or the device to each clinic appointment.   There are no supporting services by either the sleep doctors or their staff on weekends and Holidays, or after 5 PM on weekdays.   If you have been asked to come to a sleep study, make sure you bring toiletries, a comfy pillow, and any  nighttime medications that you may regularly take. Also be sure to eat dinner before you arrive. We generally do not provide meals.      PRESCRIPTIONS     We require 7 days advanced notice for prescription refills. If we do not receive the request in this time, we cannot guarantee that your medication will be refilled in time.      IMPORTANT PHONE NUMBERS     Sleep Medicine Clinic Fax: [phone]  Appointments (for Adult Sleep Clinic): [phone] - option 2  Appointments (For Sleep Studies): [phone] - option 3    Referral Services:  Behavioral Sleep Medicine: [phone]  Sleep Surgery: [phone]  ENT (Otolaryngology): [phone]  Headache Clinic (Neurology): [phone]  Neurology: [phone]  Psychiatry: [phone]    Scheduling Tests:  Pulmonary Function Testing (PFT) Center: [phone]  Pulmonary Medicine: [phone]    Durable Medical Equipment (DME) Contact Numbers:   [employer] (DME): [phone]  [employer] (DME): [phone]  [facility] (DME): [phone]      OUR ADULT SLEEP MEDICINE TEAM   Please do not hesitate to call the office or sleep nurse with any questions between appointments:    Adult Sleep Nurses (Margarita Caldwell, AYO and Tamika Raines RN):  For clinical questions and refilling prescriptions: [phone]  Email sleep diaries and other documents at: [email or web address]    Adult Sleep Medicine Secretaries:  Ana Gaston (For [place]ann/Pinky):   P: [phone]  F: [phone]      OUR SLEEP TESTING LOCATIONS     Our team will contact you to schedule your sleep study, however, you can contact us as follow:  Main Phone Line (scheduling only): [phone]), option 3  Adult and Pediatric Locations  [facility] (6 years and older): [facility] - 4th floor ([address] [place] [address]) After hours line: [phone]  [facility] at  "[facility] (Main campus: All ages): [facility], 6th floor. After hours line: [phone]   [place] (5 years and older; younger considered on case-by-case basis): [address]; [address], [address]. Scheduling  After hours line: [phone]   [place] (6 years and older): [address]; [address]; [address]   [place] (6 years and older): [address] [facility] [address]  After hours line: [phone]   [place] (13 years and older) in [place]: [address], 2nd floor  After hours line: [phone]   [place] (13 year and older): [address]  After hours line: [phone]     Adult Only Locations:   [place] (18 years and older): [address] [facility], 2nd floor   [place] (18 years and older): [address] [facility]; 4th floor  After hours line: [phone]   [place] (18 years and older) at [place]: [id number] [place] [address]  After hours line: [phone]          CONTACTING YOUR SLEEP MEDICINE PROVIDER     Send a message directly to your provider through \"My Chart\", which is the email service through your  Records Account: [email or web address]   Call [phone] and leave a message. One of the administrative assistants will forward the message to your sleep medicine provider through \"My Chart\" and/or email.     Your sleep medicine provider for this visit was: Bailey Vance MD        "

## 2025-07-30 NOTE — PROGRESS NOTES
Patient: Manolo Bashir    [id number]  : 1955 -- AGE 70 y.o.    Provider: Bailey Vance MD     Location [facility]   Service Date: 2025              [facility]  New Visit Note      HISTORY OF PRESENT ILLNESS     The patient's referring provider is: Norah Flynn,*; Elma Hutton, APRN-CNP, DNP    HISTORY OF PRESENT ILLNESS   Manolo Bashir is a 70 y.o. male with h/o  hx of ESRD s/p renal transplant x2 (initial  c/b allograft failure , 2nd transplant  c/b impaired allograft fxn), currently on iHD since 2024 (Tues/Thurs/Sat), MGUS, T2DM, HTN, prostate cx s/p radical prostatectomy, HCV s/p SVR, recent C. diff (treated w/ PO vanc 2025), presented on  after being called back for + blood cx (Staphylococcus hominis ) (likely contaminate) who presents to a [facility] for a sleep medicine evaluation with concerns of New Patient Visit.     To briefly summarize his recent medical history, the patient was referred to sleep medicine for about a year according the patient and his wife but this appointment has needed to be rescheduled multiple times due to medical events. His most recent hospitalizations and ER visits were reviewed with the patient and his wife. In 2025 he had had 5 hospitalizations for pneumonia, hypertensive crises and pneumothorax since 2025. He was started on oxycodone at that time due to severe shoulder pain. He sought care at the ED twice in May due to shoulder pain. He was hospitalized in May for +blood cx ((Staphylococcus hominis ) drawn at one of the ED visits, and this was found to be a contaminant. Hospital course was c/b hyperkalemia. Transplant ID was consulted and determined no AB needed; they also reviewed the recent history of atypical PNA that they had recommended 6 week course of Aumenting and 3 months of Cresemba, but the patient had  discontinued therapy after 2 weeks. He ultimately required readmission on 06/03 d/t displaced coracoid process fracture and massive rotator cuff tear, biceps tendon rupture and severe glenohumeral DEISY. He prsented to the ED again on 06/21 d/t shoulder pain, and was admitted in the the beginning of July 2025 for acute hypoxic respiratory failure, CHF, and pneumonia. His tacrolimus was discontinued during that admission, he received HD for volume overload and completed a course of Augmentin.     Today the patient states that he is no longer on opioids for pain control. However, his sleep is worse after stopping opioids for pain control. He and his wife recall that the reason they were referred to Sleep medicine was that he was noted to have desaturations at one of his ED visits while sleeping, but this was many months ago per their report.     In regards to pulmonary ROS, he has a small cough per his wife, non productive. No SOB. He is able to walks up stairs comfortably, and able to do chores. He takes about 3L off during dialysis     Past Sleep History  Patient has not had a sleep study in the past.    Current History  Has been habing problems sleeping since being on dialysis in the last year. Recently when at the ED, he was noticed to have low oxygen when he was sleeping.     Sleep schedule:  In bed:  9- 10   Subjective sleep latency:  30 min   Awakenings during night:  most nights wakes up at 3 or 4 -- spontanesous   Length of awakenings:  stays awk from 3a to 4:00 9am then goes back to sleep. During the awakening he goes downstairs to the chair   Goesback to sleep at 9am     Overall estimate of total sleep time: 6hours   Weekends/Days off: on days of dialysis wakes up earlier and falls asleep at dialysis     Preferred sleeping position: right side     Other sleep issues:  Ankle hurting   Snor + loud   No waking up gasping/choking   No heartburn during the night  No wake up with orethroat  +night sweakts    +headaches in the morning       ESS:   6/24  LISA:   not completed   FOSQ: not completed       REVIEW OF SYSTEMS     REVIEW OF SYSTEMS  See HPI; all other ROS were reviewed and negative for compliant        ALLERGIES AND MEDICATIONS     ALLERGIES  Allergies[1]    MEDICATIONS  Current Medications[2]      PAST HISTORY     PAST MEDICAL HISTORY  He  has a past medical history of Anemia, Arthritis, Cataract, Chronic kidney disease, stage 3 unspecified (Multi) (09/26/2018), CKD (chronic kidney disease), Cough (02/12/2024), COVID-19 (06/18/2020), Diabetes (Multi), ESRD (end stage renal disease) (Multi), Focal and segmental proliferative glomerulonephritis (12/23/2023), HTN (hypertension), Hyperlipidemia, Other long term (current) drug therapy (07/20/2021), Personal history of other diseases of the circulatory system, Personal history of other infectious and parasitic diseases (08/17/2015), Polyp, colonic (08/17/2023), Primary osteoarthritis of both ankles (08/17/2023), Prostate cancer (Multi), Tubular adenoma of colon (08/17/2023), and Unspecified kidney failure (08/17/2016).    PAST SURGICAL HISTORY:  Surgical History[3]    FAMILY HISTORY  Family History[4]    He does not have a family history of sleep disorder.    SOCIAL HISTORY  He  reports that he has never smoked. He has been exposed to tobacco smoke. He has never used smokeless tobacco. He reports that he does not currently use alcohol. He reports that he does not use drugs. He    Caffeine consumption: tea 1-2x a day,black   Alcohol consumption: no   Marijuana: no       PHYSICAL EXAM     VITAL SIGNS: /88   Pulse 78   Temp 36.4 °C (97.6 °F)   Wt 67.6 kg (149 lb)   SpO2 97% Comment: ra  BMI 22.66 kg/m²      CURRENT WEIGHT:   Vitals:    07/30/25 1026   Weight: 67.6 kg (149 lb)     Body mass index is 22.66 kg/m².  PREVIOUS WEIGHTS:  Wt Readings from Last 3 Encounters:   07/30/25 67.6 kg (149 lb)   07/16/25 69.5 kg (153 lb 4.8 oz)   07/09/25 68.2 kg (150 lb  4.8 oz)       Constitutional: Alert and oriented, cooperative, no acute distress  Head: Normocephalic, atraumatic   Cranial Features: No abnormal craniofacial features     Upper Airway Examination:  Mallampati Class: I   Tongue Scalloping: no tongue scalloping   Poor dentition  Neck circumference 16 in   Wide airway     Neck: Supple. Trachea midline.  Pulmonary: Non-labored breathing, speaks in full sentences. Expiratory wheezing L lower lung field   Cardiac: regular rate 3/6 SALVADOR   Extremities: No clubbing, no edema  Neuromuscular: Cranial nerves grossly intact, no focal deficits      RESULTS/DATA     Bicarbonate (mmol/L)   Date Value   07/05/2025 32   07/03/2025 29   07/02/2025 30     Iron (ug/dL)   Date Value   05/25/2025 25 (L)   08/24/2024 13 (L)   05/11/2024 22 (L)     Transferrin (mg/dL)   Date Value   05/26/2025 85 (L)     % Saturation (%)   Date Value   05/25/2025 22 (L)   08/24/2024 9 (L)   05/11/2024 13 (L)     TIBC (ug/dL)   Date Value   05/25/2025 113 (L)   08/24/2024 143 (L)   05/11/2024 171 (L)     Ferritin (ng/mL)   Date Value   05/25/2025 1,563 (H)   02/28/2025 1,564 (H)   08/24/2024 985 (H)         07/02/2025 CT Chest (have personally reviewed and interpretated the radiographs with the patient and his wife and agree with the radoliogist impression)   LUNG/PLEURA/LARGE AIRWAYS:  Worsening bilateral right-greater-than-left pleural effusions with  superimposed dependent lower lobe airspace opacities and atelectasis.  There are no discrete pulmonary nodules.  IMPRESSION:  1.  Worsening volume overload likely related to heart failure given  worsening bilateral pleural effusions and dependent pulmonary  edema/atelectasis and worsening anasarca. Recommend correlation with  patient's fluid status an echocardiogram given the presence of  moderate to severe cardiomegaly.  4. Dilated main pulmonary artery, which can be seen with pulmonary  hypertension.     07/02/2025 CXR (have personally reviewed and  interpretated the radiographs with the patient and his wife and agree with the radoliogist impression)   IMPRESSION:  1.  Bibasilar infiltrates.  2.  Mild vascular congestion.  Signed by Amanuel Welsh MD    06/23/2025 CT Chest  (have personally reviewed and interpretated the radiographs with the patient and his wife and agree with the radoliogist impression)   IMPRESSION:  1. Improved and resolving faint ground-glass opacities remaining in  the right upper, right lower and right middle lobes, overall favored  to represent pulmonary alveolar edema.  2. Resolved left with interval decrease to small right pleural  effusions.  3. Faint mosaic attenuation throughout the bilateral lungs can be  seen in small airways/vessel disease.  4. Moderate dilatation of the main pulmonary trunk measuring 3.5 cm  can be seen in pulmonary hypertension.    ASSESSMENT/PLAN     Mr. Bashir is a 70 y.o. male and He was referred to the [facility] for evaluation of sleep disordered breathing     Medically complex, including but not limited to: ESRD, immunosuppression s/p two failed renaltransplants, systolic heart failrue (EF 40-50%), prior history of atypical pneumonia, pneumothorax, MGUS     Problem List, Orders, Assessment, Recommendations:  The patient presents today with history of witnessed apneas, desaturations. We discussed that with his ESRD and sCHF, he may have central or complex sleep apnea, and therefore, I would recommend he be evaluated in the sleep lab rather than have a home sleep test. I will order a split night study so that we can clarify his sleep-disordered breathing status and give him trial of PAP in the lab.     Additionally, given his recent abnormal CT chest and history of atypical PNAa, I would like him to repeat a CT chest ~8 weeks following the most recent CT to assess for resolution of radiographic findings. He will be seeing Chasidy Jimenez in clinic in September as well to  follow up on those results. If he continues to have radiolographic abnormalities he may need a bronch to clarify if there are any non-resolving infectious etiologies or pursue other workup.     Disposition    Return to clinic in 3 months           [1] No Known Allergies  [2]   Current Outpatient Medications   Medication Sig Dispense Refill    blood sugar diagnostic (True Metrix Glucose Test Strip) For BG check 4x/day 400 strip 3    carvedilol (Coreg) 12.5 mg tablet Take 3 tablets (37.5 mg) by mouth 2 times a day. Hold for systolic BP <140 and HR <60. PATIENT NEEDS TO FOLLOW UP WITH CARDIOLOGY 180 tablet 11    clobetasol (Temovate) 0.05 % external solution Apply topically 2 times a day for 14 days. Use on scalp nightly if needed for itch 50 mL 11    clotrimazole (Lotrimin) 1 % cream Apply topically 2 times a day. 90 g 0    cyclobenzaprine (Flexeril) 10 mg tablet Take 0.5 tablets (5 mg) by mouth 2 times a day as needed for muscle spasms for up to 10 doses. 5 tablet 0    Easy Touch Alcohol Prep Pads pads, medicated       fluocinonide (Lidex) 0.05 % ointment Apply to affected areas twice daily when active as needed. Use less than 14 days per month. 60 g 3    glucagon (Gvoke HypoPen 1-Pack) 1 mg/0.2 mL auto-injector Inject 1 mg into the muscle every 15 minutes if needed (For blood glucose 41 to 70 mg/dL and no IV access). 0.2 mL 12    imiquimod (Aldara) 5 % cream Use daily for 6 weeks on penis, right on that white and bumpy area  Put a layer about 2 quarter size wide on that area, very thin layer, and cover with vaseline 30 packet 3    insulin glargine (Lantus Solostar U-100 Insulin) 100 unit/mL (3 mL) pen Inject 20 units subcutaneously daily 15 mL 1    insulin lispro (HumaLOG) 100 unit/mL pen 4 units with lunch and dinner plus a sliding scale up to 20 units daily 15 mL 6    isosorbide mononitrate ER (Imdur) 30 mg 24 hr tablet Take 1 tablet (30 mg) by mouth once daily. Do not crush or chew. 30 tablet 0    lancets 33  "gauge misc 1 each 3 times a day. 100 each 3    lancing device misc use as directed 1 each 0    lidocaine 4 % patch Apply one patch topically for 12 hours on and 12 hours off. 30 patch 0    losartan (Cozaar) 25 mg tablet Take 1 tablet (25 mg) by mouth once daily. 30 tablet 2    metoclopramide (Reglan) 5 mg tablet Take 1 tablet (5 mg) by mouth 3 times a day before meals. (Patient taking differently: Take 1 tablet (5 mg) by mouth 2 times a day.) 90 tablet 0    naloxone (Narcan) 4 mg/0.1 mL nasal spray Administer 1 spray (4 mg) into affected nostril(s) if needed for opioid reversal. May repeat every 2-3 minutes if needed, alternating nostrils, until medical assistance becomes available. 2 each 0    NIFEdipine ER (Adalat CC) 90 mg 24 hr tablet Take 1 tablet (90 mg) by mouth 2 times a day. Do not crush, chew, or split. 180 tablet 3    pantoprazole (ProtoNix) 40 mg EC tablet Take 1 tablet (40 mg) by mouth once daily in the morning. Take before meals. 30 tablet 4    pen needle, diabetic (TechLITE Pen Needle) 32 gauge x 5/32\" needle Use 4 a day as directed 400 each 3    polyethylene glycol (Glycolax, Miralax) 17 gram/dose powder Mix 17 g of powder and drink once daily. Do not fill before June 8, 2025. 510 g 1    pravastatin (Pravachol) 10 mg tablet Take 1 tablet (10 mg) by mouth once daily at bedtime. 30 tablet 0    predniSONE (Deltasone) 5 mg tablet Take 1 tablet (5 mg) by mouth once daily. 30 tablet 11    pregabalin (Lyrica) 25 mg capsule Take 1 capsule (25 mg) by mouth once daily at bedtime. 30 capsule 1    sevelamer carbonate (Renvela) 800 mg tablet Take 1 tablet (800 mg) by mouth 3 times a day before meals. Swallow tablet whole; do not crush, break, or chew. 90 tablet 0    sodium chloride (Ocean) 0.65 % nasal spray Administer 1 spray into each nostril 4 times a day as needed for congestion. 44 mL 12    syringe with needle 3 mL 25 x 5/8\" syringe Use to inject epogen. 7 each 0    vitamin B complex-vitamin C-folic acid " (Nephrocaps) 1 mg capsule Take 1 capsule by mouth once daily. 30 capsule 11     No current facility-administered medications for this visit.   [3]   Past Surgical History:  Procedure Laterality Date    ILEOSTOMY  04/25/2017    Ileostomy    ILEOSTOMY CLOSURE  08/17/2015    Ileostomy Closure    OTHER SURGICAL HISTORY  04/21/2017    Right Hemicolectomy    OTHER SURGICAL HISTORY  08/17/2015    Arteriovenous Surgery Creation Of A-V Fistula    OTHER SURGICAL HISTORY  08/17/2015    Sigmoidoscopy (Fiberoptic, Therapeutic )    PROSTATECTOMY  10/11/2013    Prostatectomy Radical    TRANSPLANT, KIDNEY, OPEN  1992    TRANSPLANT, KIDNEY, OPEN  2013    US GUIDED PERCUTANEOUS BIOPSY RENAL LEFT Left 11/20/2023    US GUIDED PERCUTANEOUS BIOPSY RENAL LEFT 11/20/2023 Lou Rodgers MD [facility]    US GUIDED PERCUTANEOUS PERITONEAL OR RETROPERITONEAL FLUID COLLECTION DRAINAGE  10/20/2022    US GUIDED PERCUTANEOUS PERITONEAL OR RETROPERITONEAL FLUID COLLECTION DRAINAGE 10/20/2022 [facility] CLINICAL LEGACY   [4]   Family History  Problem Relation Name Age of Onset    Bone cancer Mother      Other (corona's sarcome of the bone marrow) Mother      Prostate cancer Father      Diabetes Other Family Hist     Hypertension Other Family Hist

## 2025-08-01 DIAGNOSIS — R91.8 GROUND GLASS OPACITY PRESENT ON IMAGING OF LUNG: ICD-10-CM

## 2025-08-03 ENCOUNTER — PHARMACY VISIT (OUTPATIENT)
Dept: PHARMACY | Facility: CLINIC | Age: 70
End: 2025-08-03
Payer: COMMERCIAL

## 2025-08-04 PROCEDURE — RXMED WILLOW AMBULATORY MEDICATION CHARGE

## 2025-08-08 ENCOUNTER — PATIENT OUTREACH (OUTPATIENT)
Dept: CARE COORDINATION | Facility: CLINIC | Age: 70
End: 2025-08-08
Payer: COMMERCIAL

## 2025-08-08 DIAGNOSIS — R91.8 GROUND GLASS OPACITY PRESENT ON IMAGING OF LUNG: ICD-10-CM

## 2025-08-08 NOTE — PROGRESS NOTES
Check in 30 days after hospital discharge to support smooth transition of care. No recent hospital admissions noted upon chart review. Will close this ARISTEO encounter at this time due to unable to reach patient beyond 30 days.    Judtih Irvin RN, [facility] – [place], [place]  Phone [phone]

## 2025-08-13 ENCOUNTER — PHARMACY VISIT (OUTPATIENT)
Dept: PHARMACY | Facility: CLINIC | Age: 70
End: 2025-08-13
Payer: COMMERCIAL

## 2025-08-13 ENCOUNTER — EVALUATION (OUTPATIENT)
Dept: PHYSICAL THERAPY | Facility: CLINIC | Age: 70
End: 2025-08-13
Payer: COMMERCIAL

## 2025-08-13 ENCOUNTER — APPOINTMENT (OUTPATIENT)
Dept: DERMATOLOGY | Facility: CLINIC | Age: 70
End: 2025-08-13
Payer: COMMERCIAL

## 2025-08-13 DIAGNOSIS — M25.511 BILATERAL SHOULDER PAIN: ICD-10-CM

## 2025-08-13 DIAGNOSIS — L30.9 DERMATITIS: Primary | ICD-10-CM

## 2025-08-13 DIAGNOSIS — M25.512 LEFT SHOULDER PAIN: Primary | ICD-10-CM

## 2025-08-13 DIAGNOSIS — M25.512 BILATERAL SHOULDER PAIN: ICD-10-CM

## 2025-08-13 PROCEDURE — 1036F TOBACCO NON-USER: CPT | Performed by: STUDENT IN AN ORGANIZED HEALTH CARE EDUCATION/TRAINING PROGRAM

## 2025-08-13 PROCEDURE — 99213 OFFICE O/P EST LOW 20 MIN: CPT | Performed by: STUDENT IN AN ORGANIZED HEALTH CARE EDUCATION/TRAINING PROGRAM

## 2025-08-13 PROCEDURE — 3052F HG A1C>EQUAL 8.0%<EQUAL 9.0%: CPT | Performed by: STUDENT IN AN ORGANIZED HEALTH CARE EDUCATION/TRAINING PROGRAM

## 2025-08-13 PROCEDURE — 1159F MED LIST DOCD IN RCRD: CPT | Performed by: STUDENT IN AN ORGANIZED HEALTH CARE EDUCATION/TRAINING PROGRAM

## 2025-08-13 PROCEDURE — 97110 THERAPEUTIC EXERCISES: CPT | Mod: GP

## 2025-08-13 PROCEDURE — 4010F ACE/ARB THERAPY RXD/TAKEN: CPT | Performed by: STUDENT IN AN ORGANIZED HEALTH CARE EDUCATION/TRAINING PROGRAM

## 2025-08-13 PROCEDURE — 97162 PT EVAL MOD COMPLEX 30 MIN: CPT | Mod: GP

## 2025-08-13 ASSESSMENT — DERMATOLOGY QUALITY OF LIFE (QOL) ASSESSMENT
DATE THE QUALITY-OF-LIFE ASSESSMENT WAS COMPLETED: 67430
RATE HOW BOTHERED YOU ARE BY EFFECTS OF YOUR SKIN PROBLEMS ON YOUR ACTIVITIES (EG, GOING OUT, ACCOMPLISHING WHAT YOU WANT, WORK ACTIVITIES OR YOUR RELATIONSHIPS WITH OTHERS): 0 - NEVER BOTHERED
WHAT SINGLE SKIN CONDITION LISTED BELOW IS THE PATIENT ANSWERING THE QUALITY-OF-LIFE ASSESSMENT QUESTIONS ABOUT: DERMATITIS
RATE HOW EMOTIONALLY BOTHERED YOU ARE BY YOUR SKIN PROBLEM (FOR EXAMPLE, WORRY, EMBARRASSMENT, FRUSTRATION): 0 - NEVER BOTHERED
RATE HOW BOTHERED YOU ARE BY SYMPTOMS OF YOUR SKIN PROBLEM (EG, ITCHING, STINGING BURNING, HURTING OR SKIN IRRITATION): 0 - NEVER BOTHERED
ARE THERE EXCLUSIONS OR EXCEPTIONS FOR THE QUALITY OF LIFE ASSESSMENT: NO

## 2025-08-13 ASSESSMENT — DERMATOLOGY PATIENT ASSESSMENT
HAVE YOU HAD OR DO YOU HAVE VASCULAR DISEASE: NO
HAVE YOU HAD OR DO YOU HAVE A STAPH INFECTION: NO
DO YOU HAVE ANY NEW OR CHANGING LESIONS: NO
ARE YOU AN ORGAN TRANSPLANT RECIPIENT: YES
DO YOU USE A TANNING BED: NO

## 2025-08-13 ASSESSMENT — PATIENT GLOBAL ASSESSMENT (PGA): PATIENT GLOBAL ASSESSMENT: PATIENT GLOBAL ASSESSMENT:  1 - CLEAR

## 2025-08-13 ASSESSMENT — ITCH NUMERIC RATING SCALE: HOW SEVERE IS YOUR ITCHING?: 0

## 2025-08-14 DIAGNOSIS — R06.02 SHORTNESS OF BREATH: ICD-10-CM

## 2025-08-14 DIAGNOSIS — M25.511 CHRONIC RIGHT SHOULDER PAIN: ICD-10-CM

## 2025-08-14 DIAGNOSIS — E78.5 DYSLIPIDEMIA: ICD-10-CM

## 2025-08-14 DIAGNOSIS — G89.29 CHRONIC RIGHT SHOULDER PAIN: ICD-10-CM

## 2025-08-14 PROCEDURE — RXMED WILLOW AMBULATORY MEDICATION CHARGE

## 2025-08-14 RX ORDER — PREGABALIN 25 MG/1
25 CAPSULE ORAL NIGHTLY
Qty: 30 CAPSULE | Refills: 1 | Status: CANCELLED | OUTPATIENT
Start: 2025-08-14

## 2025-08-15 DIAGNOSIS — R91.8 GROUND GLASS OPACITY PRESENT ON IMAGING OF LUNG: ICD-10-CM

## 2025-08-15 RX ORDER — ISOSORBIDE MONONITRATE 30 MG/1
30 TABLET, EXTENDED RELEASE ORAL DAILY
Qty: 30 TABLET | Refills: 0 | OUTPATIENT
Start: 2025-08-15 | End: 2025-09-14

## 2025-08-16 ENCOUNTER — PHARMACY VISIT (OUTPATIENT)
Dept: PHARMACY | Facility: CLINIC | Age: 70
End: 2025-08-16
Payer: COMMERCIAL

## 2025-08-16 PROCEDURE — RXMED WILLOW AMBULATORY MEDICATION CHARGE

## 2025-08-18 DIAGNOSIS — R06.02 SHORTNESS OF BREATH: ICD-10-CM

## 2025-08-20 ENCOUNTER — APPOINTMENT (OUTPATIENT)
Dept: PRIMARY CARE | Facility: CLINIC | Age: 70
End: 2025-08-20
Payer: COMMERCIAL

## 2025-08-20 ENCOUNTER — HOSPITAL ENCOUNTER (OUTPATIENT)
Dept: RADIOLOGY | Facility: CLINIC | Age: 70
Discharge: HOME | End: 2025-08-20
Payer: COMMERCIAL

## 2025-08-20 ENCOUNTER — APPOINTMENT (OUTPATIENT)
Facility: CLINIC | Age: 70
End: 2025-08-20
Payer: COMMERCIAL

## 2025-08-20 ENCOUNTER — OFFICE VISIT (OUTPATIENT)
Dept: PRIMARY CARE | Facility: CLINIC | Age: 70
End: 2025-08-20
Payer: COMMERCIAL

## 2025-08-20 VITALS
BODY MASS INDEX: 23.04 KG/M2 | WEIGHT: 152 LBS | SYSTOLIC BLOOD PRESSURE: 133 MMHG | TEMPERATURE: 97.2 F | DIASTOLIC BLOOD PRESSURE: 67 MMHG | HEIGHT: 68 IN | HEART RATE: 78 BPM | RESPIRATION RATE: 16 BRPM

## 2025-08-20 DIAGNOSIS — S49.92XA INJURY OF LEFT SHOULDER, INITIAL ENCOUNTER: ICD-10-CM

## 2025-08-20 DIAGNOSIS — M25.511 CHRONIC RIGHT SHOULDER PAIN: ICD-10-CM

## 2025-08-20 DIAGNOSIS — N18.4 ACUTE RENAL FAILURE SUPERIMPOSED ON STAGE 4 CHRONIC KIDNEY DISEASE, UNSPECIFIED ACUTE RENAL FAILURE TYPE (MULTI): ICD-10-CM

## 2025-08-20 DIAGNOSIS — N17.9 ACUTE RENAL FAILURE SUPERIMPOSED ON STAGE 4 CHRONIC KIDNEY DISEASE, UNSPECIFIED ACUTE RENAL FAILURE TYPE (MULTI): ICD-10-CM

## 2025-08-20 DIAGNOSIS — Z00.00 ROUTINE GENERAL MEDICAL EXAMINATION AT HEALTH CARE FACILITY: Primary | ICD-10-CM

## 2025-08-20 DIAGNOSIS — G89.29 CHRONIC RIGHT SHOULDER PAIN: ICD-10-CM

## 2025-08-20 PROCEDURE — 99215 OFFICE O/P EST HI 40 MIN: CPT | Performed by: NURSE PRACTITIONER

## 2025-08-20 PROCEDURE — 73030 X-RAY EXAM OF SHOULDER: CPT | Mod: LT

## 2025-08-20 PROCEDURE — RXMED WILLOW AMBULATORY MEDICATION CHARGE

## 2025-08-20 PROCEDURE — 3008F BODY MASS INDEX DOCD: CPT | Performed by: NURSE PRACTITIONER

## 2025-08-20 PROCEDURE — 4010F ACE/ARB THERAPY RXD/TAKEN: CPT | Performed by: NURSE PRACTITIONER

## 2025-08-20 PROCEDURE — 99213 OFFICE O/P EST LOW 20 MIN: CPT | Mod: 25

## 2025-08-20 PROCEDURE — 1126F AMNT PAIN NOTED NONE PRSNT: CPT | Performed by: NURSE PRACTITIONER

## 2025-08-20 PROCEDURE — G0444 DEPRESSION SCREEN ANNUAL: HCPCS | Performed by: NURSE PRACTITIONER

## 2025-08-20 PROCEDURE — 1036F TOBACCO NON-USER: CPT | Performed by: NURSE PRACTITIONER

## 2025-08-20 PROCEDURE — 73030 X-RAY EXAM OF SHOULDER: CPT | Mod: LEFT SIDE | Performed by: STUDENT IN AN ORGANIZED HEALTH CARE EDUCATION/TRAINING PROGRAM

## 2025-08-20 PROCEDURE — 3052F HG A1C>EQUAL 8.0%<EQUAL 9.0%: CPT | Performed by: NURSE PRACTITIONER

## 2025-08-20 PROCEDURE — 3078F DIAST BP <80 MM HG: CPT | Performed by: NURSE PRACTITIONER

## 2025-08-20 PROCEDURE — 99212 OFFICE O/P EST SF 10 MIN: CPT

## 2025-08-20 PROCEDURE — 3075F SYST BP GE 130 - 139MM HG: CPT | Performed by: NURSE PRACTITIONER

## 2025-08-20 PROCEDURE — 1170F FXNL STATUS ASSESSED: CPT | Performed by: NURSE PRACTITIONER

## 2025-08-20 RX ORDER — PREGABALIN 25 MG/1
25 CAPSULE ORAL NIGHTLY
Qty: 30 CAPSULE | Refills: 1 | Status: SHIPPED | OUTPATIENT
Start: 2025-08-20

## 2025-08-20 SDOH — ECONOMIC STABILITY: FOOD INSECURITY: WITHIN THE PAST 12 MONTHS, YOU WORRIED THAT YOUR FOOD WOULD RUN OUT BEFORE YOU GOT MONEY TO BUY MORE.: NEVER TRUE

## 2025-08-20 SDOH — ECONOMIC STABILITY: FOOD INSECURITY: WITHIN THE PAST 12 MONTHS, THE FOOD YOU BOUGHT JUST DIDN'T LAST AND YOU DIDN'T HAVE MONEY TO GET MORE.: NEVER TRUE

## 2025-08-20 ASSESSMENT — ACTIVITIES OF DAILY LIVING (ADL)
GROCERY_SHOPPING: INDEPENDENT
DOING_HOUSEWORK: INDEPENDENT
BATHING: INDEPENDENT
DRESSING: INDEPENDENT
MANAGING_FINANCES: INDEPENDENT
TAKING_MEDICATION: INDEPENDENT

## 2025-08-20 ASSESSMENT — ENCOUNTER SYMPTOMS
DEPRESSION: 0
OCCASIONAL FEELINGS OF UNSTEADINESS: 0
LOSS OF SENSATION IN FEET: 0

## 2025-08-20 ASSESSMENT — PATIENT HEALTH QUESTIONNAIRE - PHQ9
2. FEELING DOWN, DEPRESSED OR HOPELESS: NOT AT ALL
1. LITTLE INTEREST OR PLEASURE IN DOING THINGS: NOT AT ALL
SUM OF ALL RESPONSES TO PHQ9 QUESTIONS 1 AND 2: 0

## 2025-08-20 ASSESSMENT — PAIN SCALES - GENERAL: PAINLEVEL_OUTOF10: 0-NO PAIN

## 2025-08-20 ASSESSMENT — LIFESTYLE VARIABLES: HOW MANY STANDARD DRINKS CONTAINING ALCOHOL DO YOU HAVE ON A TYPICAL DAY: PATIENT DOES NOT DRINK

## 2025-08-21 ENCOUNTER — PHARMACY VISIT (OUTPATIENT)
Dept: PHARMACY | Facility: CLINIC | Age: 70
End: 2025-08-21
Payer: COMMERCIAL

## 2025-08-21 ENCOUNTER — HOSPITAL ENCOUNTER (OUTPATIENT)
Dept: RADIOLOGY | Facility: HOSPITAL | Age: 70
Discharge: HOME | End: 2025-08-21
Payer: COMMERCIAL

## 2025-08-21 DIAGNOSIS — R91.8 ABNORMAL CT SCAN OF LUNG: ICD-10-CM

## 2025-08-21 LAB
ALBUMIN SERPL-MCNC: 3.4 G/DL (ref 3.6–5.1)
ALP SERPL-CCNC: 78 U/L (ref 35–144)
ALT SERPL-CCNC: 13 U/L (ref 9–46)
ANION GAP SERPL CALCULATED.4IONS-SCNC: 9 MMOL/L (CALC) (ref 7–17)
AST SERPL-CCNC: 14 U/L (ref 10–35)
BILIRUB SERPL-MCNC: 0.4 MG/DL (ref 0.2–1.2)
BUN SERPL-MCNC: 23 MG/DL (ref 7–25)
CALCIUM SERPL-MCNC: 9.6 MG/DL (ref 8.6–10.3)
CHLORIDE SERPL-SCNC: 98 MMOL/L (ref 98–110)
CO2 SERPL-SCNC: 30 MMOL/L (ref 20–32)
CREAT SERPL-MCNC: 8.69 MG/DL (ref 0.7–1.35)
EGFRCR SERPLBLD CKD-EPI 2021: 6 ML/MIN/1.73M2
ERYTHROCYTE [DISTWIDTH] IN BLOOD BY AUTOMATED COUNT: 15.6 % (ref 11–15)
GLUCOSE SERPL-MCNC: 142 MG/DL (ref 65–99)
HCT VFR BLD AUTO: 30.3 % (ref 38.5–50)
HGB BLD-MCNC: 10 G/DL (ref 13.2–17.1)
MCH RBC QN AUTO: 30.3 PG (ref 27–33)
MCHC RBC AUTO-ENTMCNC: 33 G/DL (ref 32–36)
MCV RBC AUTO: 91.8 FL (ref 80–100)
PLATELET # BLD AUTO: 157 THOUSAND/UL (ref 140–400)
PMV BLD REES-ECKER: 11.7 FL (ref 7.5–12.5)
POTASSIUM SERPL-SCNC: 4.5 MMOL/L (ref 3.5–5.3)
PROT SERPL-MCNC: 7.1 G/DL (ref 6.1–8.1)
RBC # BLD AUTO: 3.3 MILLION/UL (ref 4.2–5.8)
SODIUM SERPL-SCNC: 137 MMOL/L (ref 135–146)
WBC # BLD AUTO: 4.4 THOUSAND/UL (ref 3.8–10.8)

## 2025-08-21 PROCEDURE — 71250 CT THORAX DX C-: CPT

## 2025-08-21 RX ORDER — ISOSORBIDE MONONITRATE 30 MG/1
30 TABLET, EXTENDED RELEASE ORAL DAILY
Qty: 30 TABLET | Refills: 0 | Status: SHIPPED | OUTPATIENT
Start: 2025-08-21 | End: 2025-09-21

## 2025-08-21 RX ORDER — PRAVASTATIN SODIUM 10 MG/1
10 TABLET ORAL NIGHTLY
Qty: 30 TABLET | Refills: 0 | Status: SHIPPED | OUTPATIENT
Start: 2025-08-21

## 2025-08-22 DIAGNOSIS — R91.8 GROUND GLASS OPACITY PRESENT ON IMAGING OF LUNG: ICD-10-CM

## 2025-08-22 PROCEDURE — RXMED WILLOW AMBULATORY MEDICATION CHARGE

## 2025-08-25 ENCOUNTER — APPOINTMENT (OUTPATIENT)
Dept: PODIATRY | Facility: CLINIC | Age: 70
End: 2025-08-25
Payer: COMMERCIAL

## 2025-08-25 DIAGNOSIS — G89.29 CHRONIC PAIN OF BOTH ANKLES: ICD-10-CM

## 2025-08-25 DIAGNOSIS — M25.571 SINUS TARSI SYNDROME OF BOTH ANKLES: ICD-10-CM

## 2025-08-25 DIAGNOSIS — I73.9 PERIPHERAL VASCULAR DISEASE: ICD-10-CM

## 2025-08-25 DIAGNOSIS — E11.9 DIABETES MELLITUS TYPE 2 WITHOUT RETINOPATHY (MULTI): Primary | ICD-10-CM

## 2025-08-25 DIAGNOSIS — M25.572 SINUS TARSI SYNDROME OF BOTH ANKLES: ICD-10-CM

## 2025-08-25 DIAGNOSIS — M25.572 CHRONIC PAIN OF BOTH ANKLES: ICD-10-CM

## 2025-08-25 DIAGNOSIS — M25.571 CHRONIC PAIN OF BOTH ANKLES: ICD-10-CM

## 2025-08-25 PROCEDURE — RXMED WILLOW AMBULATORY MEDICATION CHARGE

## 2025-08-26 PROCEDURE — RXMED WILLOW AMBULATORY MEDICATION CHARGE

## 2025-08-27 ENCOUNTER — TREATMENT (OUTPATIENT)
Dept: PHYSICAL THERAPY | Facility: CLINIC | Age: 70
End: 2025-08-27
Payer: COMMERCIAL

## 2025-08-27 ENCOUNTER — PHARMACY VISIT (OUTPATIENT)
Dept: PHARMACY | Facility: CLINIC | Age: 70
End: 2025-08-27
Payer: COMMERCIAL

## 2025-08-27 DIAGNOSIS — M25.511 BILATERAL SHOULDER PAIN: ICD-10-CM

## 2025-08-27 DIAGNOSIS — M25.512 BILATERAL SHOULDER PAIN: ICD-10-CM

## 2025-08-27 DIAGNOSIS — M25.512 LEFT SHOULDER PAIN: ICD-10-CM

## 2025-08-27 PROCEDURE — 97110 THERAPEUTIC EXERCISES: CPT | Mod: GP

## 2025-08-29 DIAGNOSIS — R91.8 GROUND GLASS OPACITY PRESENT ON IMAGING OF LUNG: ICD-10-CM

## 2025-08-29 PROCEDURE — RXMED WILLOW AMBULATORY MEDICATION CHARGE

## 2025-09-04 ENCOUNTER — PHARMACY VISIT (OUTPATIENT)
Dept: PHARMACY | Facility: CLINIC | Age: 70
End: 2025-09-04
Payer: COMMERCIAL

## 2025-09-08 ENCOUNTER — APPOINTMENT (OUTPATIENT)
Dept: PULMONOLOGY | Facility: HOSPITAL | Age: 70
End: 2025-09-08
Payer: COMMERCIAL

## 2025-09-12 ENCOUNTER — APPOINTMENT (OUTPATIENT)
Dept: ORTHOPEDIC SURGERY | Facility: CLINIC | Age: 70
End: 2025-09-12
Payer: COMMERCIAL

## 2025-09-29 ENCOUNTER — APPOINTMENT (OUTPATIENT)
Dept: CARDIOLOGY | Facility: CLINIC | Age: 70
End: 2025-09-29
Payer: COMMERCIAL

## 2025-11-12 ENCOUNTER — APPOINTMENT (OUTPATIENT)
Dept: ENDOCRINOLOGY | Facility: CLINIC | Age: 70
End: 2025-11-12
Payer: COMMERCIAL

## 2025-12-10 ENCOUNTER — APPOINTMENT (OUTPATIENT)
Dept: ENDOCRINOLOGY | Facility: CLINIC | Age: 70
End: 2025-12-10
Payer: COMMERCIAL

## 2026-03-02 ENCOUNTER — APPOINTMENT (OUTPATIENT)
Dept: PODIATRY | Facility: CLINIC | Age: 71
End: 2026-03-02
Payer: COMMERCIAL

## (undated) DEVICE — Device

## (undated) DEVICE — SYRINGE, LUER LOCK, 12ML

## (undated) DEVICE — SUTURE, SILK, 2-0, 18 IN, BLACK

## (undated) DEVICE — DRAPE, INSTRUMENT, W/POUCH, STERI DRAPE, 7 X 11 IN, DISPOSABLE, STERILE

## (undated) DEVICE — SUTURE, PROLENE, 6-0, 24 IN, BV-1, BLUE

## (undated) DEVICE — SUTURE, VICRYL, 3-0, 27 IN, SH

## (undated) DEVICE — SUTURE, MONOCRYL, 4-0, 18 IN, PS2, UNDYED

## (undated) DEVICE — COVER, CART, 45 X 27 X 48 IN, CLEAR

## (undated) DEVICE — BANDAGE, GAUZE, CONFORMING, KERLIX LITE, 4 IN X 4.1 YD, STERILE

## (undated) DEVICE — DRAPE, PAD, PREP, W/ 9 IN CUFF, 24 X 41, LF, NS

## (undated) DEVICE — MANIFOLD, 4 PORT NEPTUNE STANDARD

## (undated) DEVICE — LOOP, VESSEL, MINI, WHITE

## (undated) DEVICE — ADHESIVE, SKIN, LIQUIBAND EXCEED

## (undated) DEVICE — DRAPE, SHEET, ORTHOPEDIC, EXTREMITY, BILATERAL, W/ARM BOARD COVERS, 72 X 120 IN, DISPOSABLE, LF, STERILE

## (undated) DEVICE — BAG, DECANTER

## (undated) DEVICE — SUTURE, SILK, 4-0, 18 IN, LABYRINTH, BLACK

## (undated) DEVICE — LOOP, VESSEL, MAXI, RED

## (undated) DEVICE — SPONGE, HEMOSTATIC, GELATIN, SURGIFOAM, 8 X 12.5 CM X 10 MM

## (undated) DEVICE — SUTURE, SILK, 3-0, 18 IN, MULTIPACK, BLACK

## (undated) DEVICE — LOOP, VESSEL, MAXI, BLUE

## (undated) DEVICE — DRAPE, SHEET, FAN FOLDED, HALF, 44 X 58 IN, DISPOSABLE, LF, STERILE

## (undated) DEVICE — SUTURE, PROLENE, 7-0, 24 IN, BV1, BLUE

## (undated) DEVICE — SYRINGE, 20 CC, LUER LOCK, MONOJECT, W/O CAP, LF